# Patient Record
Sex: FEMALE | Race: WHITE | NOT HISPANIC OR LATINO | Employment: OTHER | URBAN - METROPOLITAN AREA
[De-identification: names, ages, dates, MRNs, and addresses within clinical notes are randomized per-mention and may not be internally consistent; named-entity substitution may affect disease eponyms.]

---

## 2017-02-08 DIAGNOSIS — Z13.820 ENCOUNTER FOR SCREENING FOR OSTEOPOROSIS: ICD-10-CM

## 2017-05-04 ENCOUNTER — GENERIC CONVERSION - ENCOUNTER (OUTPATIENT)
Dept: OTHER | Facility: OTHER | Age: 57
End: 2017-05-04

## 2017-06-07 DIAGNOSIS — Z12.31 ENCOUNTER FOR SCREENING MAMMOGRAM FOR MALIGNANT NEOPLASM OF BREAST: ICD-10-CM

## 2017-06-08 ENCOUNTER — ALLSCRIPTS OFFICE VISIT (OUTPATIENT)
Dept: OTHER | Facility: OTHER | Age: 57
End: 2017-06-08

## 2017-08-21 ENCOUNTER — GENERIC CONVERSION - ENCOUNTER (OUTPATIENT)
Dept: OTHER | Facility: OTHER | Age: 57
End: 2017-08-21

## 2017-08-28 ENCOUNTER — CLINICAL SUPPORT (OUTPATIENT)
Dept: OTHER | Facility: OTHER | Age: 57
End: 2017-08-28

## 2018-01-13 NOTE — MISCELLANEOUS
Signatures   Electronically signed by : Dagoberto Caba DO; Aug 28 2017 11:15AM EST                       (Author)

## 2018-01-14 NOTE — MISCELLANEOUS
Provider Comments  Provider Comments:   pt no showed today      Signatures   Electronically signed by : Jorje Tobin, ; Aug 21 2017  9:06AM EST                       (Author)

## 2018-01-14 NOTE — CONSULTS
Chief Complaint  Chief Complaint Free Text Note Form: Pt is here for her MWM consult  History of Present Illness  Free Text HPI: Excess weight:  Severity: Mild  Onset:past 4yrs  Modifiers:worse after breaking up w/ boyfriend, inconsistent lifestyle changes, hx of bulimia  Associated Symptoms: doesn't feel good in her body  Past Medical History    1  History of Leukopenia (288 50) (D72 819)  Active Problems And Past Medical History Reviewed: The active problems and past medical history were reviewed and updated today  Assessment    1  Weight gain (783 1) (R63 5)   2  Major depression, recurrent (296 30) (F33 9)   3  Bulimia nervosa (307 51) (F50 2)   4  Schreiber esophagus (530 85) (K22 70)    Discussion/Summary  Discussion Summary:   55 yo female w/ MDD, ?hypothyroidism and excess weight here to pursue medical weight management to improve weight and health  Excess weight/weight gain:  -discussed options of conservative vs LEANA program +/- meal replacement  -initial focus of 5-10% weight loss over 3-6 mos for improved health  -occurred after breaking up w/ bf over the course of 6 mos and due to depression unable to lose it    MDD:  -on Geodon, Seroquel and Paxil (known weight gainers)  -as per psych    Hypothyroidism:  -on very low dose T4    Bulimia:  -denies purging in the past 2 years, but admits to binging    Barretts:  -on PPI  -pt states last endoscopy was 5 yrs ago  -advised to follow up w/ GI    RTC in 1mos    Goals:  1  Food log [email or web address]  [email or web address]  2  No sugary beverages  At least 64oz of water daily  3  Increase physical activity by 10 minutes daily  4  9660-8168 calories, see sample menu  Review of Systems  Focused-Female:   Constitutional: no fever  ENT: no sore throat  Cardiovascular: no chest pain  Respiratory: no shortness of breath  Gastrointestinal: no abdominal pain  Genitourinary: no dysuria  Musculoskeletal: no arthralgias  Integumentary: rash  Neurological: no headache  Other Symptoms: Psych:+MDD, stable, + bulimia  Active Problems    1  Schreiber esophagus (530 85) (K22 70)   2  Bulimia nervosa (307 51) (F50 2)   3  Colonoscopy (Fiberoptic) Screening   4  Constipation (564 00) (K59 00)   5  Family Disruption Death Of Family Member Parent (V61 07)   6  Major depression, recurrent (296 30) (F33 9)    Surgical History    1  History of Biopsy Bone Marrow   2  History of Breast Surgery Enlargement Procedure   3  History of Rhinoplasty  Surgical History Reviewed: The surgical history was reviewed and updated today  Family History    1  Family history of Uterine Cancer (V16 49)    2  Family history of Esophageal Cancer (V16 0)    3  Family history of Colon Cancer (V16 0)  Family History Reviewed: The family history was reviewed and updated today  Social History    · Family Disruption Death Of Family Member Parent (V61 07)   · Never A Smoker   · Never Drank Alcohol  Social History Reviewed: The social history was reviewed and updated today  Current Meds   1  Dulcolax 5 MG Oral Tablet Delayed Release; Therapy: 59Squ5906 to Recorded   2  Geodon 80 MG Oral Capsule; TAKE 1 CAPSULE 3 times daily; Therapy: 85Izn2510 to (Evaluate:20Cku0425)  Requested for: 01Htx0282; Last   Rx:07Ald1061 Ordered   3  Levothyroxine Sodium 25 MCG Oral Tablet; Therapy: 01KMK7127 to (Last Rx:31Fnn9965)  Requested for: 80OMO3143 Ordered   4  LORazepam 2 MG Oral Tablet; TAKE 1 TABLET 4 TIMES DAILY; Therapy: (Recorded:91Luo2189) to Recorded   5  Omeprazole 40 MG Oral Capsule Delayed Release; TAKE 1 CAPSULE TWICE DAILY; Therapy: 99LLK1782 to (Evaluate:12Qxy3179)  Requested for: 55Uuz1839; Last   Rx:13Xni5365 Ordered   6  PARoxetine HCl - 30 MG Oral Tablet; TAKE 1 TABLET DAILY; Therapy: 34VCN0513 to (Evaluate:67Wjj9481)  Requested for: 51RHN6998; Last   RI:35FMO2685; Status: ACTIVE - Renewal Voided Ordered   7   SEROquel 200 MG Oral Tablet; TAKE 2 TABLETS AT BEDTIME; Therapy: 87VYM2267 to (Evaluate:20Jun2014)  Requested for: 11GXT0932; Last   KK:37PKA9616 Ordered  Medication List Reviewed: The medication list was reviewed and updated today  Allergies    1  RisperDAL TABS    2  Animal dander   3  Latex   4  Mold   5  Pollen    Vitals  Vital Signs [Data Includes: Current Encounter]    Recorded: 31KQK3760 09:50AM   Temperature 98 5 F    Heart Rate 80    Respiration 14    Systolic 272    Diastolic 72    Height 5 ft 4 5 in    Weight 165 lb 4 8 oz    BMI Calculated 27 94    BSA Calculated 1 81    Waist Circumference  38   Neck Circumference  13 5     Physical Exam    Constitutional   General appearance: No acute distress, well appearing and well nourished  well developed and overweight  Eyes no conjunctival pallor  Ears, Nose, Mouth, and Throat slightly dry oral mucosa  Pulmonary   Respiratory effort: No increased work of breathing or signs of respiratory distress  Auscultation of lungs: Clear to auscultation  Cardiovascular   Auscultation of heart: Normal rate and rhythm, normal S1 and S2, without murmurs  Examination of extremities for edema and/or varicosities: Normal     Abdomen   Abdomen: Non-tender, no masses  The abdomen was soft and nontender  Musculoskeletal   Gait and station: Normal     Psychiatric   Orientation to person, place, and time: Normal     Mood and affect: Normal   Mood and Affect: flat          Results/Data  Encounter Results   STOP BANG Questionnaire [id number] 10:03AM User, Ahs     Test Name Result Flag Reference   STOP BANG Questionnaire Risk of ISABELLA Low Risk     Snoring: No  Tired: Yes  Observed: No  Blood Pressure: No  BMI: No  Age: Yes  Neck Circumference: No  Gender: No   STOP BANG Questionnaire ISABELLA Total Score 2     Snoring: No  Tired: Yes  Observed: No  Blood Pressure: No  BMI: No  Age: Yes  Neck Circumference: No  Gender: No       Future Appointments    Date/Time Provider Specialty Site   03/28/2016 09:45 AM Aster Carlin IVAN Tinajero   Bariatric Medicine [facility]     Signatures   Electronically signed by : IVAN Zhou ; Feb 18 2016  1:07PM EST                       (Author)

## 2018-01-16 NOTE — PROGRESS NOTES
Assessment    1  Encounter for preventive health examination (V70 0) (Z00 00)   2  Neutropenia, unspecified (288 00) (D70 9)   3  Microcytic anemia (280 9) (D50 9)   4  Hypothyroidism (244 9) (E03 9)   5  Hyperlipidemia (272 4) (E78 5)    Plan  Schreiber esophagus    · *1 -  [facility] Co-Management  *  Status: Active   Requested for: 95OGC8570  Care Summary provided  : Yes  Colon cancer screening    · COLONOSCOPY; Status:Canceled;    · *1 -  [facility] Physician Referral  Consult  Status:  Canceled  Care Summary provided  : Yes  Colon cancer screening, Microcytic anemia    · COLONOSCOPY; Status:Active; Requested IPJ:[id number];   Encounter for screening mammogram for breast cancer    · * MAMMO SCREENING BILATERAL W CAD; Status:Hold For - Scheduling; Requested  for:07Jun2017;   Microcytic anemia    · Colace 100 MG Oral Capsule; take 1 capsule by mouth twice a day   · Ferrous Sulfate 325 (65 Fe) MG Oral Tablet Delayed Release; Take 1 tablet twice  daily   · 2 - *[facility] ( COLORECTAL SURGERY, GASTROENTEROLOGY)  Co-Management  *  Status: Hold For - Scheduling   Requested for: 53COB6288  Care Summary provided  : Yes  Microcytic anemia, Neutropenia, unspecified    · (Q) IRON, TIBC AND FERRITIN PANEL; Status:Active; Requested SIZ:[id number];     Discussion/Summary    Neutropenia - longstanding but worse  Will address anemia first and f/u  If trending down further will refer to Heme  Microcytic anemia with known h/o iron deficiency - has seen Dr Alejandro Warner in past for colonoscopy  Had seen Dr Megan Soto for Schreiber's, but not in a while  Will start iron supplement and check iron studies for baseline  Will need to go back to GI and colorectal for further evaluation given much lower Hgb  Hypothyroidism - euthyroid now on current levothyroxine  No change  Follow Q6-12 months  HLD - 10 yr ASCVD risk 2 2%, no need for statin  Will monitor annually    HM - Hasn't gone for colonoscopy (see above, referral given again)  Doesn't want mammogram due to her breast implants, discussed risk/benefit  Doesn't remember last pap, will request record from her last gyn  The patient was counseled regarding diagnostic results, instructions for management, risk factor reductions, prognosis, patient and family education, impressions, risks and benefits of treatment options, importance of compliance with treatment  Possible side effects of new medications were reviewed with the patient/guardian today  The treatment plan was reviewed with the patient/guardian  The patient/guardian understands and agrees with the treatment plan     Self Referrals: Yes Psych      Chief Complaint  Preventive Visit      History of Present Illness  HPI: Insurance prompted her to have her Preventive Visit  Reviewed old records available (scanned documents Nov 2016)  Hypothyroid - denies any symptoms  Neutropenia - has had eval in past (30 years worth) attributed to her psych meds in past   Depression - managed by psychiatrist at [facility], feels her mood is stable  Active Problems    1  Anxiety (300 00) (F41 9)   2  Back pain at L4-L5 level (724 2) (M54 5)   3  Schreiber esophagus (530 85) (K22 70)   4  Bulimia nervosa (307 51) (F50 2)   5  Colon cancer screening (V76 51) (Z12 11)   6  Colonoscopy (Fiberoptic) Screening   7  Constipation (564 00) (K59 00)   8  Encounter for mammogram to establish baseline mammogram (V76 12) (Z12 31)   9  Encounter for screening mammogram for breast cancer (V76 12) (Z12 31)   10  Family Disruption Death Of Family Member Parent (V61 07)   11  Hyperlipidemia (272 4) (E78 5)   12  Hypothyroidism (244 9) (E03 9)   13  Leukopenia (288 50) (D72 819)   14  Major depression, recurrent (296 30) (F33 9)   15  Microcytic anemia (280 9) (D50 9)   16  Need for influenza vaccination (V04 81) (Z23)   17  Neutropenia, unspecified (288 00) (D70 9)   18  Osteoporosis screening (V82 81) (Z13 820)   19   Synovial cyst of lumbar facet joint (727 40) (M71 38)   20  Weight gain (783 1) (R63 5)    Surgical History    · History of Biopsy Bone Marrow   · History of Breast Surgery Enlargement Procedure   · History of Rhinoplasty    Family History  Mother    · Family history of Uterine Cancer (V16 49)  Father    · Family history of Esophageal Cancer (V16 0)  Maternal Grandmother    · Family history of Colon Cancer (V16 0)    Social History    · Family Disruption Death Of Family Member Parent (V61 07)   · Never A Smoker   · Never Drank Alcohol    Current Meds   1  Dulcolax 5 MG Oral Tablet Delayed Release; Therapy: 36Rzc7911 to Recorded   2  Geodon 80 MG Oral Capsule; TAKE 1 CAPSULE 3 times daily; Therapy: 99Dvy9649 to (Evaluate:24Umb6361)  Requested for: 74Dgb6933; Last   Rx:45Qxg7065 Ordered   3  Levothyroxine Sodium 25 MCG Oral Tablet; take one tablet daily; Therapy: 20NUG2974 to (Evaluate:39Ytf8758)  Requested for: 54LGA7327; Last   Rx:36Iwh9749 Ordered   4  LORazepam 2 MG Oral Tablet; TAKE 1 TABLET 4 TIMES DAILY; Therapy: (Recorded:00Ecj1607) to Recorded   5  Omeprazole 40 MG Oral Capsule Delayed Release; TAKE 1 CAPSULE TWICE DAILY; Therapy: 20KGT4357 to (Evaluate:19Kvz3273)  Requested for: 81Hvu5649; Last   Rx:79Bjb4250 Ordered   6  PARoxetine HCl - 30 MG Oral Tablet; Take one tablet 3 times daily; Therapy: (Recorded:67Jez4504) to Recorded   7  SEROquel 200 MG Oral Tablet; TAKE 2 TABLETS AT BEDTIME; Therapy: 25XWK7238 to (Evaluate:65Fkp4146)  Requested for: 02QQK9490; Last   MX:80EZX8135 Ordered    Allergies    1  RisperDAL TABS    2  Animal dander   3  Latex   4  Mold   5   Pollen    Vitals   Recorded: 04TSU6137 08:58AM   Temperature 98 5 F   Heart Rate 80   Respiration 16   Systolic 291   Diastolic 90   Height 5 ft 4 5 in   Weight 174 lb    BMI Calculated 29 41   BSA Calculated 1 85   Pain Scale 0     Results/Data  PHQ-9 Adult Depression Screening 08Jun2017 09:05AM User, Ahs     Test Name Result Flag Reference PHQ-9 Adult Depression Score 5     Over the last two weeks, how often have you been bothered by any of the following problems? Little interest or pleasure in doing things: Several days - 1  Feeling down, depressed, or hopeless: Several days - 1  Trouble falling or staying asleep, or sleeping too much: Not at all - 0  Feeling tired or having little energy: Several days - 1  Poor appetite or over eating: Several days - 1  Feeling bad about yourself - or that you are a failure or have let yourself or your family down: Several days - 1  Trouble concentrating on things, such as reading the newspaper or watching television: Not at all - 0  Moving or speaking so slowly that other people could have noticed  Or the opposite -  being so fidgety or restless that you have been moving around a lot more than usual: Not at all - 0  Thoughts that you would be better off dead, or of hurting yourself in some way: Not at all - 0   PHQ-9 Adult Depression Screening Negative     PHQ-9 Difficulty Level Somewhat difficult     PHQ-9 Severity Mild Depression         Future Appointments    Date/Time Provider Specialty Site   08/21/2017 08:50 AM IVAN Keyes   [address]     Signatures   Electronically signed by : IVAN Guzman ; Jun 9 2017  5:11PM EST                       (Author)

## 2018-01-18 NOTE — MISCELLANEOUS
May 4, 2017      Dear [facility],    Your recent prescription  requested has been refilled  We have tried to reach you by phone  Please call to schedule follow up with Dr Gertha Burkitt and have blood work prior  to visit        Sincerely,      MARIE Mcnally 1      Electronically signed Patel Gandhi   May  4 2017 11:14AM EST

## 2018-01-22 VITALS
DIASTOLIC BLOOD PRESSURE: 90 MMHG | WEIGHT: 174 LBS | BODY MASS INDEX: 28.99 KG/M2 | HEART RATE: 80 BPM | TEMPERATURE: 98.5 F | RESPIRATION RATE: 16 BRPM | SYSTOLIC BLOOD PRESSURE: 138 MMHG | HEIGHT: 65 IN

## 2018-01-25 ENCOUNTER — TELEPHONE (OUTPATIENT)
Dept: FAMILY MEDICINE CLINIC | Facility: CLINIC | Age: 58
End: 2018-01-25

## 2018-01-25 ENCOUNTER — TELEPHONE (OUTPATIENT)
Dept: NEUROSURGERY | Facility: CLINIC | Age: 58
End: 2018-01-25

## 2018-01-26 ENCOUNTER — TELEPHONE (OUTPATIENT)
Dept: FAMILY MEDICINE CLINIC | Facility: CLINIC | Age: 58
End: 2018-01-26

## 2018-01-26 NOTE — TELEPHONE ENCOUNTER
Komal sent me a patient call note about this patient requesting Geodon, but I haven't seen her since last June  She needs to schedule an appt and then I will give her only enough to get to that appt  It looks like the other note was "closed" so I couldn't respond

## 2018-01-26 NOTE — TELEPHONE ENCOUNTER
Patient calling to request refill on Geodon 80mg tid  Checked allscripts, last visit 6/8/17, no pending appointments  Are you able to refill until appoint is scheduled

## 2018-02-05 ENCOUNTER — OFFICE VISIT (OUTPATIENT)
Dept: NEUROSURGERY | Facility: CLINIC | Age: 58
End: 2018-02-05
Payer: COMMERCIAL

## 2018-02-05 VITALS
HEIGHT: 63 IN | SYSTOLIC BLOOD PRESSURE: 110 MMHG | DIASTOLIC BLOOD PRESSURE: 70 MMHG | BODY MASS INDEX: 28.99 KG/M2 | WEIGHT: 163.6 LBS | TEMPERATURE: 97.9 F

## 2018-02-05 DIAGNOSIS — M54.41 CHRONIC BILATERAL LOW BACK PAIN WITH BILATERAL SCIATICA: ICD-10-CM

## 2018-02-05 DIAGNOSIS — G89.29 CHRONIC BILATERAL LOW BACK PAIN WITH BILATERAL SCIATICA: ICD-10-CM

## 2018-02-05 DIAGNOSIS — M54.42 CHRONIC BILATERAL LOW BACK PAIN WITH BILATERAL SCIATICA: ICD-10-CM

## 2018-02-05 DIAGNOSIS — M71.38 SYNOVIAL CYST OF LUMBAR FACET JOINT: Primary | ICD-10-CM

## 2018-02-05 PROCEDURE — 99204 OFFICE O/P NEW MOD 45 MIN: CPT | Performed by: RADIOLOGY

## 2018-02-05 NOTE — PROGRESS NOTES
Assessment/Plan:     Diagnoses and all orders for this visit:    Synovial cyst of lumbar facet joint  -     XR spine lumbar complete w bending minimum 6 views  -     Ambulatory referral to Physical Therapy  -     MRI lumbar spine without contrast; Future    Chronic bilateral low back pain with bilateral sciatica  -     XR spine lumbar complete w bending minimum 6 views  -     Ambulatory referral to Physical Therapy            Discussion Summary: Ms Shaquille Gimenez presents for evaluation of her back pain and treatment of a synovial cyst  Her back pain is primarily related to prolonged sitting and standing from the seated position  The pain itself is very reproducible with palpation over the facet joints and paraspinal musculature  She does not demonstrate a radiculopathy  Currently I do not feel that her synovial cyst is responsible for her symptoms  Additionally, it has been over 1 year since she has had any imaging and she has yet to try to any conservative therapy  We will obtain a repeat MRI and lumbar XR as well as a physical therapy referral  She may take Tylenol or Advil OTC for pain relief  I have also recommended she speak with her PCP for other medication strategies  Chief Complaint: Consult (synovial cyst)      Patient ID: Ping Liu is a 62 y o  female    HPI    Ms Shaquille Gimenez is a 62year-old woman with long-history of back pain for at least 2 years  States ever since injuring her back lifting a heavy kitchen appliance  The pain started on the right, but now also on the left  Sitting makes the pain better  When she stands to walk then the pain is worst with associated radiation "electrical" shooting pain down both buttocks, stopping just above the mid thigh  On average her pain is a 2/10 on the pain scale  It could be as high as 8/10  Walking or axial bending or twisting does not make the pain worse  No sensory changes  No weakness   No recent bowel/bladder dysfunction, but note is made of chronic constipation due to her psychiatric medications  Review of Systems   Constitutional: Negative  HENT: Negative  Eyes: Negative  Respiratory: Negative  Cardiovascular: Negative  Gastrointestinal: Negative  Endocrine: Negative  Genitourinary: Negative  Musculoskeletal: Positive for back pain (LBP, goes into hips and legs) and gait problem  Negative for arthralgias, joint swelling, myalgias, neck pain and neck stiffness  Skin: Negative  Allergic/Immunologic: Negative  Neurological: Positive for dizziness  Negative for tremors, seizures, syncope, facial asymmetry, speech difficulty, weakness, light-headedness, numbness and headaches  Hematological: Negative  Psychiatric/Behavioral: Negative  The following portions of the patient's history were reviewed and updated as appropriate: allergies, current medications, past family history, past medical history, past social history, past surgical history and problem list       Active Ambulatory Problems     Diagnosis Date Noted    Hyponatremia 09/25/2016    Major depressive disorder 09/26/2016     Resolved Ambulatory Problems     Diagnosis Date Noted    Pneumonia 09/25/2016     Past Medical History:   Diagnosis Date    Anxiety     Back pain at L4-L5 level     Schreiber esophagus     Bulimia     Depression     Disease of thyroid gland     GERD (gastroesophageal reflux disease)     Hyperlipidemia     Hypothyroidism     Leukopenia     Synovial cyst of lumbar spine     Vertigo     Weight gain        Past Surgical History:   Procedure Laterality Date    BONE MARROW BIOPSY      BREAST SURGERY      enlargement     RHINOPLASTY           Vitals:    02/05/18 0919   BP: 110/70   Temp: 97 9 °F (36 6 °C)         Objective:    Physical Exam   Constitutional: She is oriented to person, place, and time  She appears well-developed and well-nourished  HENT:   Head: Normocephalic and atraumatic     Eyes: Conjunctivae and EOM are normal  Pupils are equal, round, and reactive to light  Cardiovascular: Normal rate and regular rhythm  Pulmonary/Chest: Effort normal and breath sounds normal    Abdominal: Soft  Bowel sounds are normal    Musculoskeletal: Normal range of motion  She exhibits no edema or tenderness  Lower lumbar spine tenderness over the paraspinal areas and likely facet joint region  Neurological: She is alert and oriented to person, place, and time  She has normal strength and normal reflexes  No cranial nerve deficit or sensory deficit  Neurologic Exam     Mental Status   Oriented to person, place, and time  Cranial Nerves     CN III, IV, VI   Pupils are equal, round, and reactive to light  Extraocular motions are normal      Motor Exam     Strength   Strength 5/5 throughout  Results/Data:  I have reviewed the MRI lumbar spine with the patient

## 2018-02-05 NOTE — LETTER
February 5, 2018     Emily Lane MD  [address]    Patient: Brando Posadas   YOB: 1960   Date of Visit: 2/5/2018       Dear Dr Jessee Ma: Thank you for referring Brando Posadas to me for evaluation  Below are my notes for this consultation  If you have questions, please do not hesitate to call me  I look forward to following your patient along with you  Sincerely,        Neelam Orosco MD        CC: No Recipients  Neelam Orosco MD  2/5/2018  4:35 PM  Sign at close encounter  Assessment/Plan:     Diagnoses and all orders for this visit:    Synovial cyst of lumbar facet joint  -     XR spine lumbar complete w bending minimum 6 views  -     Ambulatory referral to Physical Therapy  -     MRI lumbar spine without contrast; Future    Chronic bilateral low back pain with bilateral sciatica  -     XR spine lumbar complete w bending minimum 6 views  -     Ambulatory referral to Physical Therapy            Discussion Summary: Ms Veena Persaud presents for evaluation of her back pain and treatment of a synovial cyst  Her back pain is primarily related to prolonged sitting and standing from the seated position  The pain itself is very reproducible with palpation over the facet joints and paraspinal musculature  She does not demonstrate a radiculopathy  Currently I do not feel that her synovial cyst is responsible for her symptoms  Additionally, it has been over 1 year since she has had any imaging and she has yet to try to any conservative therapy  We will obtain a repeat MRI and lumbar XR as well as a physical therapy referral  She may take Tylenol or Advil OTC for pain relief  I have also recommended she speak with her PCP for other medication strategies  Chief Complaint: Consult (synovial cyst)      Patient ID: Brando Posadas is a 62 y o  female    HPI    Ms Veena Persaud is a 62year-old woman with long-history of back pain for at least 2 years  States ever since injuring her back lifting a heavy kitchen appliance  The pain started on the right, but now also on the left  Sitting makes the pain better  When she stands to walk then the pain is worst with associated radiation "electrical" shooting pain down both buttocks, stopping just above the mid thigh  On average her pain is a 2/10 on the pain scale  It could be as high as 8/10  Walking or axial bending or twisting does not make the pain worse  No sensory changes  No weakness  No recent bowel/bladder dysfunction, but note is made of chronic constipation due to her psychiatric medications  Review of Systems   Constitutional: Negative  HENT: Negative  Eyes: Negative  Respiratory: Negative  Cardiovascular: Negative  Gastrointestinal: Negative  Endocrine: Negative  Genitourinary: Negative  Musculoskeletal: Positive for back pain (LBP, goes into hips and legs) and gait problem  Negative for arthralgias, joint swelling, myalgias, neck pain and neck stiffness  Skin: Negative  Allergic/Immunologic: Negative  Neurological: Positive for dizziness  Negative for tremors, seizures, syncope, facial asymmetry, speech difficulty, weakness, light-headedness, numbness and headaches  Hematological: Negative  Psychiatric/Behavioral: Negative            {Common ambulatory SmartLinks:20205}      Active Ambulatory Problems     Diagnosis Date Noted    Hyponatremia 09/25/2016    Major depressive disorder 09/26/2016     Resolved Ambulatory Problems     Diagnosis Date Noted    Pneumonia 09/25/2016     Past Medical History:   Diagnosis Date    Anxiety     Back pain at L4-L5 level     Schreiber esophagus     Bulimia     Depression     Disease of thyroid gland     GERD (gastroesophageal reflux disease)     Hyperlipidemia     Hypothyroidism     Leukopenia     Synovial cyst of lumbar spine     Vertigo     Weight gain        Past Surgical History:   Procedure Laterality Date    BONE MARROW BIOPSY      BREAST SURGERY      enlargement     RHINOPLASTY           Vitals:    02/05/18 0919   BP: 110/70   Temp: 97 9 °F (36 6 °C)         Objective:    Physical Exam   Constitutional: She is oriented to person, place, and time  She appears well-developed and well-nourished  HENT:   Head: Normocephalic and atraumatic  Eyes: Conjunctivae and EOM are normal  Pupils are equal, round, and reactive to light  Cardiovascular: Normal rate and regular rhythm  Pulmonary/Chest: Effort normal and breath sounds normal    Abdominal: Soft  Bowel sounds are normal    Musculoskeletal: Normal range of motion  She exhibits no edema or tenderness  Lower lumbar spine tenderness over the paraspinal areas and likely facet joint region  Neurological: She is alert and oriented to person, place, and time  She has normal strength and normal reflexes  No cranial nerve deficit or sensory deficit  Neurologic Exam     Mental Status   Oriented to person, place, and time  Cranial Nerves     CN III, IV, VI   Pupils are equal, round, and reactive to light  Extraocular motions are normal      Motor Exam     Strength   Strength 5/5 throughout  Results/Data:  I have reviewed the MRI lumbar spine with the patient

## 2018-02-20 ENCOUNTER — HOSPITAL ENCOUNTER (OUTPATIENT)
Dept: RADIOLOGY | Facility: HOSPITAL | Age: 58
Discharge: HOME | End: 2018-02-20
Attending: RADIOLOGY
Payer: COMMERCIAL

## 2018-02-20 ENCOUNTER — TRANSCRIBE ORDERS (OUTPATIENT)
Dept: RADIOLOGY | Facility: HOSPITAL | Age: 58
End: 2018-02-20

## 2018-02-20 DIAGNOSIS — M54.42 CHRONIC MIDLINE LOW BACK PAIN WITH BILATERAL SCIATICA: ICD-10-CM

## 2018-02-20 DIAGNOSIS — M71.38 SYNOVIAL CYST OF LUMBAR FACET JOINT: Primary | ICD-10-CM

## 2018-02-20 DIAGNOSIS — M54.41 ACUTE BACK PAIN WITH SCIATICA, RIGHT: ICD-10-CM

## 2018-02-20 DIAGNOSIS — M71.38 SYNOVIAL CYST OF LUMBAR FACET JOINT: ICD-10-CM

## 2018-02-20 DIAGNOSIS — M54.41 CHRONIC MIDLINE LOW BACK PAIN WITH BILATERAL SCIATICA: ICD-10-CM

## 2018-02-20 DIAGNOSIS — G89.29 CHRONIC MIDLINE LOW BACK PAIN WITH BILATERAL SCIATICA: ICD-10-CM

## 2018-02-20 PROCEDURE — 72148 MRI LUMBAR SPINE W/O DYE: CPT

## 2018-02-20 PROCEDURE — 72110 X-RAY EXAM L-2 SPINE 4/>VWS: CPT

## 2018-02-23 ENCOUNTER — TELEPHONE (OUTPATIENT)
Dept: NEUROSURGERY | Facility: CLINIC | Age: 58
End: 2018-02-23

## 2018-02-23 NOTE — TELEPHONE ENCOUNTER
Attempted to contact patient to advise her that Dr Jun Millard would like to refer her to ortho  Received no answer left message for the patient to call back on direct extension

## 2018-02-28 NOTE — TELEPHONE ENCOUNTER
Received call back from João Moreland today in response to my recent call to her  Advised her that Dr Tita Antoine wanted to have her FUP with her ortho doctor  The patient wanted to know why and had questions about the findings in the recent imaging studies  She stated she was under the impression the cyst was going to be aspirated  Noted in Dr Suha Dodson that the cyst was shrinking however I am unable to discuss his findings with her  Kept appt approaching 03/12/2018 to discuss this further with Md  Patient agreeable

## 2018-03-12 ENCOUNTER — TELEPHONE (OUTPATIENT)
Dept: OBGYN CLINIC | Facility: HOSPITAL | Age: 58
End: 2018-03-12

## 2018-03-12 ENCOUNTER — OFFICE VISIT (OUTPATIENT)
Dept: NEUROSURGERY | Facility: CLINIC | Age: 58
End: 2018-03-12
Payer: COMMERCIAL

## 2018-03-12 VITALS
SYSTOLIC BLOOD PRESSURE: 110 MMHG | DIASTOLIC BLOOD PRESSURE: 70 MMHG | HEIGHT: 63 IN | TEMPERATURE: 97.6 F | WEIGHT: 162.4 LBS | BODY MASS INDEX: 28.77 KG/M2

## 2018-03-12 DIAGNOSIS — M71.38 SYNOVIAL CYST OF LUMBAR SPINE: Primary | ICD-10-CM

## 2018-03-12 PROCEDURE — 99213 OFFICE O/P EST LOW 20 MIN: CPT | Performed by: RADIOLOGY

## 2018-03-12 RX ORDER — QUETIAPINE FUMARATE 400 MG/1
400 TABLET, FILM COATED ORAL
Refills: 5 | Status: ON HOLD | COMMUNITY
Start: 2018-03-05 | End: 2018-08-29

## 2018-03-12 NOTE — PROGRESS NOTES
Assessment/Plan:     Diagnoses and all orders for this visit:    Synovial cyst of lumbar spine  -     Ambulatory referral to Orthopedic Surgery; Future            Discussion Summary: Ms Letha Carcamo presents for follow-up and review of her spine imaging  I have discussed with her that her synovial cyst has regressed however she has increasing stress edema in the L4 and L5 pedicles with evidence of L4 on L5 motion on her flexion-extension X-rays  I have advised she discuss further management with Dr Margaret Gant as a CT guided laminotomy and synovial cyst fenestration will likely be of no benefit  The patient, patient's family was counseled regarding diagnostic results, instructions for management  Total time of encounter was 45 minutes and 25 minutes was spent counseling  Chief Complaint: Follow-up (f/u with new MRI and xray)      Patient ID: Antonio Cunningham is a 62 y o  female    HPI     Ms Plascencia returns following MRI and lumbar XR imaging  She reports increasing back pain, very severe now, limiting her ability to perform tasks  She has not been to physical therapy as she fells it made her mother worse so she is afraid of the same  She has not yet made an appointment with Dr Margaret Gant as well  Review of Systems   Constitutional: Negative for activity change, appetite change, chills, diaphoresis, fatigue, fever and unexpected weight change  HENT: Negative  Eyes: Negative  Respiratory: Negative  Cardiovascular: Negative  Gastrointestinal: Negative  Endocrine: Negative  Genitourinary: Negative  Musculoskeletal: Positive for back pain and gait problem  Negative for arthralgias, joint swelling, myalgias, neck pain and neck stiffness  Skin: Negative  Allergic/Immunologic: Negative  Neurological: Positive for weakness and numbness  Negative for dizziness, tremors, seizures, syncope, facial asymmetry, speech difficulty, light-headedness and headaches     Hematological: Negative  Psychiatric/Behavioral: Negative  The following portions of the patient's history were reviewed and updated as appropriate: allergies, current medications, past family history, past medical history, past social history, past surgical history and problem list       Active Ambulatory Problems     Diagnosis Date Noted    Hyponatremia 09/25/2016    Major depressive disorder 09/26/2016     Resolved Ambulatory Problems     Diagnosis Date Noted    Pneumonia 09/25/2016     Past Medical History:   Diagnosis Date    Anxiety     Back pain at L4-L5 level     Schreiber esophagus     Bulimia     Depression     Disease of thyroid gland     GERD (gastroesophageal reflux disease)     Hyperlipidemia     Hypothyroidism     Leukopenia     Synovial cyst of lumbar spine     Vertigo     Weight gain        Past Surgical History:   Procedure Laterality Date    BONE MARROW BIOPSY      BREAST SURGERY      enlargement     RHINOPLASTY           Vitals:    03/12/18 0904   BP: 110/70   Temp: 97 6 °F (36 4 °C)         Objective:    Physical Exam   Constitutional: She is oriented to person, place, and time  She appears well-developed  Musculoskeletal: She exhibits tenderness  Tenderness of the lumbar spine  Neurological: She is alert and oriented to person, place, and time  She has normal reflexes  No cranial nerve deficit  Coordination normal      Neurologic Exam     Mental Status   Oriented to person, place, and time  Results/Data:  I have reviewed the results and images from the MRI and XR in detail with the patient

## 2018-03-12 NOTE — TELEPHONE ENCOUNTER
Caller: patient  Callback# [phone]  Dr Marylee Foreman        I schedule patient 04/16 as a new patient  Patient doesn't want to wait until than can you please view MRI to see if she need to be sooner  Thanks

## 2018-03-19 ENCOUNTER — OFFICE VISIT (OUTPATIENT)
Dept: OBGYN CLINIC | Facility: HOSPITAL | Age: 58
End: 2018-03-19
Payer: COMMERCIAL

## 2018-03-19 VITALS
SYSTOLIC BLOOD PRESSURE: 133 MMHG | DIASTOLIC BLOOD PRESSURE: 89 MMHG | HEART RATE: 77 BPM | BODY MASS INDEX: 27.83 KG/M2 | WEIGHT: 163 LBS | HEIGHT: 64 IN

## 2018-03-19 DIAGNOSIS — M54.16 LUMBAR RADICULOPATHY: Primary | ICD-10-CM

## 2018-03-19 DIAGNOSIS — M43.16 SPONDYLOLISTHESIS AT L4-L5 LEVEL: ICD-10-CM

## 2018-03-19 DIAGNOSIS — M51.36 DDD (DEGENERATIVE DISC DISEASE), LUMBAR: ICD-10-CM

## 2018-03-19 DIAGNOSIS — M48.062 SPINAL STENOSIS OF LUMBAR REGION WITH NEUROGENIC CLAUDICATION: ICD-10-CM

## 2018-03-19 DIAGNOSIS — M54.40 LOW BACK PAIN WITH SCIATICA, SCIATICA LATERALITY UNSPECIFIED, UNSPECIFIED BACK PAIN LATERALITY, UNSPECIFIED CHRONICITY: Primary | ICD-10-CM

## 2018-03-19 PROBLEM — M51.369 DDD (DEGENERATIVE DISC DISEASE), LUMBAR: Status: ACTIVE | Noted: 2018-03-19

## 2018-03-19 PROCEDURE — 99213 OFFICE O/P EST LOW 20 MIN: CPT | Performed by: ORTHOPAEDIC SURGERY

## 2018-03-19 NOTE — ASSESSMENT & PLAN NOTE
Worsening lower back and bilateral leg pain  Patient with history of multilevel lumbar DDD most pronounced at L4-5 and L5-S1  Grade 1/2 spondylolisthesis at L4-5  Patient is interested in definitive intervention  She has not had injections  I have encouraged at although she is a candidate for lumbar decompression and fusion L4-S1 she can try an epidural steroid injection at the L4-5 level to help alleviate some of her symptoms  She will be referred to pain management for lumbar epidural steroid injections  She will follow up in 4-6 weeks for re-evaluation thereafter    If no significant improvement she will be scheduled for surgical decompression and fusion L4-S1

## 2018-03-19 NOTE — PROGRESS NOTES
Assessment/Plan:    DDD (degenerative disc disease), lumbar  Worsening lower back and bilateral leg pain  Patient with history of multilevel lumbar DDD most pronounced at L4-5 and L5-S1  Grade 1/2 spondylolisthesis at L4-5  Patient is interested in definitive intervention  She has not had injections  I have encouraged at although she is a candidate for lumbar decompression and fusion L4-S1 she can try an epidural steroid injection at the L4-5 level to help alleviate some of her symptoms  She will be referred to pain management for lumbar epidural steroid injections  She will follow up in 4-6 weeks for re-evaluation thereafter  If no significant improvement she will be scheduled for surgical decompression and fusion L4-S1  Problem List Items Addressed This Visit     Lumbar radiculopathy - Primary    DDD (degenerative disc disease), lumbar     Worsening lower back and bilateral leg pain  Patient with history of multilevel lumbar DDD most pronounced at L4-5 and L5-S1  Grade 1/2 spondylolisthesis at L4-5  Patient is interested in definitive intervention  She has not had injections  I have encouraged at although she is a candidate for lumbar decompression and fusion L4-S1 she can try an epidural steroid injection at the L4-5 level to help alleviate some of her symptoms  She will be referred to pain management for lumbar epidural steroid injections  She will follow up in 4-6 weeks for re-evaluation thereafter  If no significant improvement she will be scheduled for surgical decompression and fusion L4-S1  Spondylolisthesis at L4-L5 level      Other Visit Diagnoses     Spinal stenosis of lumbar region with neurogenic claudication                Subjective:      Patient ID: Rochelle Coles is a 62 y o  female  HPI  Patient presents to the office for low back and bilateral leg pain  Her leg pain goes down to her knees bilaterally   She has had an increase of pain since her last visit in 2016  Her pain increases with activities such as standing and walking  She describes a classic shopping cart sign when she walks  She does not report incontinence of bowel or bladder     The following portions of the patient's history were reviewed and updated as appropriate: current medications, past family history, past medical history, past social history, past surgical history and problem list     Review of Systems   Constitutional: Negative  HENT: Negative  Eyes: Negative  Cardiovascular: Negative  Gastrointestinal: Negative  Genitourinary: Negative  Musculoskeletal: Positive for back pain  Skin: Negative  Psychiatric/Behavioral: Negative  Objective:      /89   Pulse 77   Ht 5' 4" (1 626 m)   Wt 73 9 kg (163 lb)   BMI 27 98 kg/m²          Physical Exam   Constitutional: She is oriented to person, place, and time  She appears well-developed and well-nourished  HENT:   Head: Normocephalic and atraumatic  Eyes: Pupils are equal, round, and reactive to light  Neck: Neck supple  Cardiovascular: Intact distal pulses  Neurological: She is alert and oriented to person, place, and time  Skin: Skin is warm and dry  Psychiatric: She has a normal mood and affect  Her behavior is normal        Awake, alert and oriented x 3  Affect as appropriate  Patient ambulates without assistance  Modified straight leg raise negative bilaterally  Strength 5/5 L2-S1 bilaterally  Sensation intact bilaterally  Reflexes hypoactive at L4 and S1 bilaterally        Imaging    MRI lumbar spine demonstrates multilevel lumbar DDD  She has a grade 1/2 spondylolisthesis at L4-5 with severe canal and lateral recess stenosis  She has mild stenosis at L5-S1

## 2018-03-26 ENCOUNTER — TELEPHONE (OUTPATIENT)
Dept: OBGYN CLINIC | Facility: HOSPITAL | Age: 58
End: 2018-03-26

## 2018-03-26 DIAGNOSIS — M54.16 RADICULOPATHY, LUMBAR REGION: Primary | ICD-10-CM

## 2018-03-26 RX ORDER — METHYLPREDNISOLONE 4 MG/1
TABLET ORAL
Qty: 21 TABLET | Refills: 0 | Status: SHIPPED | OUTPATIENT
Start: 2018-03-26 | End: 2018-08-29 | Stop reason: HOSPADM

## 2018-03-26 NOTE — TELEPHONE ENCOUNTER
Long discussion with patient  She has a history of depression and she feels like the Diclofenac is making it worse  She denies any suicidal or homicidal ideations  She was instructed to stop this medication and avoid NSAIDs, and to touch base with her Psychiatrist as well regarding this issue  Medrol dose pack was put through to her pharmacy to help with her symptoms  She was also instructed to establish care with the spine and pain team per Dr Kota Coyne recommendations at last visit    Thanks

## 2018-03-26 NOTE — TELEPHONE ENCOUNTER
Caller: patient  Callback# [phone]  Dr Berg Cough        Patient is requesting a callback stated she is feeling extremely dizzy and depress while taken diclofenac sodium  Please call thanks

## 2018-04-14 DIAGNOSIS — E03.9 ACQUIRED HYPOTHYROIDISM: Primary | ICD-10-CM

## 2018-04-16 RX ORDER — LEVOTHYROXINE SODIUM 0.03 MG/1
TABLET ORAL
Qty: 30 TABLET | Refills: 2 | Status: SHIPPED | OUTPATIENT
Start: 2018-04-16 | End: 2018-07-15 | Stop reason: SDUPTHER

## 2018-04-23 ENCOUNTER — OFFICE VISIT (OUTPATIENT)
Dept: OBGYN CLINIC | Facility: HOSPITAL | Age: 58
End: 2018-04-23
Payer: COMMERCIAL

## 2018-04-23 VITALS
BODY MASS INDEX: 28.17 KG/M2 | HEIGHT: 64 IN | DIASTOLIC BLOOD PRESSURE: 81 MMHG | HEART RATE: 92 BPM | SYSTOLIC BLOOD PRESSURE: 119 MMHG | WEIGHT: 165 LBS

## 2018-04-23 DIAGNOSIS — M51.36 DDD (DEGENERATIVE DISC DISEASE), LUMBAR: ICD-10-CM

## 2018-04-23 DIAGNOSIS — M54.16 LUMBAR RADICULOPATHY: Primary | ICD-10-CM

## 2018-04-23 DIAGNOSIS — M43.16 SPONDYLOLISTHESIS AT L4-L5 LEVEL: ICD-10-CM

## 2018-04-23 PROCEDURE — 99213 OFFICE O/P EST LOW 20 MIN: CPT | Performed by: ORTHOPAEDIC SURGERY

## 2018-04-23 RX ORDER — MELOXICAM 7.5 MG/1
7.5 TABLET ORAL DAILY PRN
Qty: 21 TABLET | Refills: 0 | Status: SHIPPED | OUTPATIENT
Start: 2018-04-23 | End: 2018-04-23

## 2018-04-23 NOTE — PROGRESS NOTES
Assessment/Plan:    Patient seen examined by Dr Alfred Colvin and myself  She has a history of chronic low back pain secondary to multilevel lumbar degenerative disc disease most pronounced at L4-5 and L5-S1 she also has a grade 1-2 spondylolisthesis at L4-5  Symptoms are improved since her last visit with resolution of bilateral leg pain  Our recommendation is to establish care with pain management for injections and oral medications  She wishes to avoid surgery at all costs at this time  She can follow up with us on a p r n  basis  Problem List Items Addressed This Visit     Lumbar radiculopathy - Primary    Relevant Medications    meloxicam (MOBIC) 7 5 mg tablet    Other Relevant Orders    Ambulatory referral to Pain Management    DDD (degenerative disc disease), lumbar    Relevant Medications    meloxicam (MOBIC) 7 5 mg tablet    Other Relevant Orders    Ambulatory referral to Pain Management    Spondylolisthesis at L4-L5 level    Relevant Medications    meloxicam (MOBIC) 7 5 mg tablet    Other Relevant Orders    Ambulatory referral to Pain Management            Subjective:      Patient ID: Gladis Baca is a 62 y o  female  HPI     The patient is a 51-year-old female who presents for follow-up appointment  She was last seen about one month ago for chronic low back pain and bilateral leg pain  She was referred to Pain Management for injections however she did not have this done due to the fact she was frayed of more pain from the injection  Today she states that overall she feels better, her lower back pain is unchanged however she has improvement in leg pain and now that is resolved  She does admit to chronic intermittent left foot numbness which is unchanged  No bowel or bladder incontinence no saddle anesthesia  Today she is looking for alternatives to surgery      The following portions of the patient's history were reviewed and updated as appropriate: allergies, current medications, past family history, past medical history, past social history, past surgical history and problem list     Review of Systems   Constitutional: Negative for appetite change, chills, diaphoresis, fatigue and fever  Respiratory: Negative for chest tightness, shortness of breath and wheezing  Cardiovascular: Negative for chest pain, palpitations and leg swelling  Gastrointestinal: Negative for abdominal pain, constipation and vomiting  Genitourinary: Negative for decreased urine volume and difficulty urinating  Musculoskeletal: Positive for arthralgias and back pain  Negative for gait problem  Skin: Negative for color change, pallor, rash and wound  Neurological: Positive for numbness  Negative for weakness  Objective:      /81   Pulse 92   Ht 5' 4 02" (1 626 m)   Wt 74 8 kg (165 lb)   BMI 28 31 kg/m²          Physical Exam   Constitutional: She is oriented to person, place, and time  She appears well-developed and well-nourished  No distress  HENT:   Head: Normocephalic and atraumatic  Cardiovascular: Intact distal pulses  Pulmonary/Chest: Effort normal    Abdominal: Soft  Musculoskeletal: She exhibits no edema or tenderness  Neurological: She is alert and oriented to person, place, and time  Skin: Skin is warm and dry  She is not diaphoretic  Psychiatric: She has a normal mood and affect  Nursing note and vitals reviewed  No acute distress  Gait is normal  She has pinpoint pupils  Inspection reveals no open wounds or erythema  He lumbosacral region is nontender to palpation  Negative modified straight leg She is motor and sensory stable L2 through S1  Reflexes are hypoactive at L4 and S1 bilaterally  Feet are warm well perfused there is no calf tenderness or pitting edema

## 2018-05-10 ENCOUNTER — TELEPHONE (OUTPATIENT)
Dept: OBGYN CLINIC | Facility: HOSPITAL | Age: 58
End: 2018-05-10

## 2018-05-10 DIAGNOSIS — M51.36 DDD (DEGENERATIVE DISC DISEASE), LUMBAR: Primary | ICD-10-CM

## 2018-05-10 NOTE — TELEPHONE ENCOUNTER
Patient sees Dr Hemant Griffith  She is calling to request a refill on her Meloxicam 7 5mg tabs  She uses the [facility] on file

## 2018-05-11 RX ORDER — MELOXICAM 7.5 MG/1
7.5 TABLET ORAL DAILY PRN
Qty: 21 TABLET | Refills: 0 | Status: SHIPPED | OUTPATIENT
Start: 2018-05-11 | End: 2018-06-05 | Stop reason: SDUPTHER

## 2018-05-25 ENCOUNTER — OFFICE VISIT (OUTPATIENT)
Dept: FAMILY MEDICINE CLINIC | Facility: CLINIC | Age: 58
End: 2018-05-25
Payer: COMMERCIAL

## 2018-05-25 VITALS
DIASTOLIC BLOOD PRESSURE: 80 MMHG | SYSTOLIC BLOOD PRESSURE: 140 MMHG | HEIGHT: 63 IN | RESPIRATION RATE: 14 BRPM | TEMPERATURE: 99.4 F | HEART RATE: 80 BPM | WEIGHT: 170.2 LBS | BODY MASS INDEX: 30.16 KG/M2

## 2018-05-25 DIAGNOSIS — R05.9 COUGH: Primary | ICD-10-CM

## 2018-05-25 DIAGNOSIS — Z87.39 HISTORY OF OSTEOPOROSIS: ICD-10-CM

## 2018-05-25 PROCEDURE — 99213 OFFICE O/P EST LOW 20 MIN: CPT | Performed by: FAMILY MEDICINE

## 2018-05-25 RX ORDER — FLUTICASONE PROPIONATE 50 MCG
1 SPRAY, SUSPENSION (ML) NASAL DAILY
Qty: 1 BOTTLE | Refills: 0 | Status: SHIPPED | OUTPATIENT
Start: 2018-05-25 | End: 2018-08-29 | Stop reason: HOSPADM

## 2018-05-25 NOTE — ASSESSMENT & PLAN NOTE
- acute on chronic cough with acute viral URI  - has chronic GERD which causes nighttime coughing, despite omeprazole use, will refer to ENT, will also need EGD in the future as well   -supportive care with flonase and nasal saline use   - follow up in 3 months

## 2018-05-25 NOTE — PROGRESS NOTES
Assessment/Plan     History of osteoporosis  -diagnosed 10 years ago with no follow up, on PPI which is risk factor  - will obtain dexa scan     Cough  - acute on chronic cough with acute viral URI  - has chronic GERD which causes nighttime coughing, despite omeprazole use, will refer to ENT, will also need EGD in the future as well   -supportive care with flonase and nasal saline use   - follow up in 3 months      Diagnoses and all orders for this visit:    Cough  -     fluticasone (FLONASE) 50 mcg/act nasal spray; 1 spray into each nostril daily for 14 days  -     Ambulatory Referral to Otolaryngology; Future    History of osteoporosis  -     DXA bone density spine hip and pelvis; Future         Subjective     Chief Complaint: cough    HPI:   This is a 61 yo F who presents for nasal congestion for 3 days  She reports subjective fever from Tuesday to Thursday that has now resolved  She denies ear pain, sneezing  She has dry cough  She reports a history of GERD that she takes omeprazole that does not control her reflux symptoms and cough  The following portions of the patient's history were reviewed and updated as appropriate: allergies, current medications, past family history, past medical history, past social history, past surgical history and problem list     Review of Systems  Review of Systems   Constitutional: Negative for fatigue and fever  HENT: Positive for congestion  Negative for postnasal drip  Respiratory: Positive for cough  Negative for shortness of breath  Cardiovascular: Negative for chest pain and palpitations  Objective   Vitals:    05/25/18 1131   BP: 140/80   Pulse: 80   Resp: 14   Temp: 99 4 °F (37 4 °C)       Physical Exam   Constitutional: She is oriented to person, place, and time  She appears well-developed and well-nourished  HENT:   Head: Normocephalic and atraumatic     Right Ear: External ear normal    Left Ear: External ear normal    Mouth/Throat: Oropharynx is clear and moist  No oropharyngeal exudate  Swelling of right nasal turbinate    Neck: Normal range of motion  Neck supple  Cardiovascular: Normal rate, regular rhythm and normal heart sounds  No murmur heard  Pulmonary/Chest: Effort normal and breath sounds normal  No respiratory distress  She has no wheezes  Abdominal: Soft  Bowel sounds are normal  There is no tenderness  Neurological: She is alert and oriented to person, place, and time  Skin: Skin is warm  Psychiatric: She has a normal mood and affect  Lab Results: I have reviewed all the lab results

## 2018-06-05 ENCOUNTER — TELEPHONE (OUTPATIENT)
Dept: OBGYN CLINIC | Facility: HOSPITAL | Age: 58
End: 2018-06-05

## 2018-06-05 DIAGNOSIS — M51.36 DDD (DEGENERATIVE DISC DISEASE), LUMBAR: ICD-10-CM

## 2018-06-05 RX ORDER — MELOXICAM 7.5 MG/1
7.5 TABLET ORAL DAILY PRN
Qty: 40 TABLET | Refills: 0 | Status: SHIPPED | OUTPATIENT
Start: 2018-06-05 | End: 2018-06-12 | Stop reason: SINTOL

## 2018-06-05 NOTE — TELEPHONE ENCOUNTER
Dr Tanika Hemphill    Patient called requesting a refill on meloxicam (MOBIC) 7 5 mg tablet     Take 1 tablet (7 5 mg total) by mouth daily as needed for mild pain or moderate pain    [facility]/pharmacy #[id number], [address] [place] [address]

## 2018-06-07 ENCOUNTER — TELEPHONE (OUTPATIENT)
Dept: FAMILY MEDICINE CLINIC | Facility: CLINIC | Age: 58
End: 2018-06-07

## 2018-06-07 NOTE — TELEPHONE ENCOUNTER
Patient seen Dr Pa Keen,  About 2 weeks ago for a URI,  She states she's still not better and now has dizziness  An appt was offered and refused but would rather a return call from nurse with advice

## 2018-06-08 ENCOUNTER — HOSPITAL ENCOUNTER (OUTPATIENT)
Dept: RADIOLOGY | Age: 58
Discharge: HOME | End: 2018-06-08
Payer: COMMERCIAL

## 2018-06-08 DIAGNOSIS — Z87.39 HISTORY OF OSTEOPOROSIS: ICD-10-CM

## 2018-06-08 PROCEDURE — 77080 DXA BONE DENSITY AXIAL: CPT

## 2018-06-08 NOTE — TELEPHONE ENCOUNTER
Spoke with patient, she is complaining of vertigo, nausea and low grade temp 99  She has no nasal symptoms, no ear symptoms, no coughing  Offered appoint, declined  Advised she hydrate herself, BRAT diet  She did say she will try these things, if no improvement by Monday, she will call to schedule

## 2018-06-12 ENCOUNTER — TELEPHONE (OUTPATIENT)
Dept: OBGYN CLINIC | Facility: HOSPITAL | Age: 58
End: 2018-06-12

## 2018-06-12 DIAGNOSIS — M51.36 DDD (DEGENERATIVE DISC DISEASE), LUMBAR: Primary | ICD-10-CM

## 2018-06-12 RX ORDER — DICLOFENAC POTASSIUM 50 MG/1
25 TABLET, FILM COATED ORAL 2 TIMES DAILY PRN
Qty: 40 TABLET | Refills: 0 | Status: SHIPPED | OUTPATIENT
Start: 2018-06-12 | End: 2018-07-01 | Stop reason: SDUPTHER

## 2018-06-12 NOTE — TELEPHONE ENCOUNTER
Patient instructed to stop Mobic  She is not interested in taking Tylenol or Motrin  She has declined Medrol dose pack  Will Rx Diclofenac PRN    Patient made aware that she can  at pharmacy

## 2018-06-12 NOTE — TELEPHONE ENCOUNTER
Caller: patient  Call back number: [phone]  Patient's doctor: Dr John Pierre    Patient called stating the meloxicam is making her dizzy  She is screaming that she will not take this and needs something else  She is very upset and wants to speak to someone

## 2018-06-15 ENCOUNTER — TELEPHONE (OUTPATIENT)
Dept: OBGYN CLINIC | Facility: HOSPITAL | Age: 58
End: 2018-06-15

## 2018-06-15 ENCOUNTER — CLINICAL SUPPORT (OUTPATIENT)
Dept: PAIN MEDICINE | Facility: CLINIC | Age: 58
End: 2018-06-15
Payer: COMMERCIAL

## 2018-06-15 VITALS
SYSTOLIC BLOOD PRESSURE: 107 MMHG | RESPIRATION RATE: 14 BRPM | WEIGHT: 168 LBS | BODY MASS INDEX: 29.77 KG/M2 | TEMPERATURE: 97.8 F | HEART RATE: 96 BPM | DIASTOLIC BLOOD PRESSURE: 71 MMHG | HEIGHT: 63 IN

## 2018-06-15 DIAGNOSIS — M51.36 DDD (DEGENERATIVE DISC DISEASE), LUMBAR: ICD-10-CM

## 2018-06-15 DIAGNOSIS — S22.080A T12 COMPRESSION FRACTURE (HCC): ICD-10-CM

## 2018-06-15 DIAGNOSIS — M47.816 LUMBAR SPONDYLOSIS: Primary | ICD-10-CM

## 2018-06-15 DIAGNOSIS — M54.16 LUMBAR RADICULOPATHY: ICD-10-CM

## 2018-06-15 DIAGNOSIS — M71.38 SYNOVIAL CYST OF LUMBAR FACET JOINT: ICD-10-CM

## 2018-06-15 DIAGNOSIS — M43.16 SPONDYLOLISTHESIS AT L4-L5 LEVEL: ICD-10-CM

## 2018-06-15 DIAGNOSIS — M48.062 SPINAL STENOSIS OF LUMBAR REGION WITH NEUROGENIC CLAUDICATION: ICD-10-CM

## 2018-06-15 PROCEDURE — 99204 OFFICE O/P NEW MOD 45 MIN: CPT | Performed by: ANESTHESIOLOGY

## 2018-06-15 NOTE — PROGRESS NOTES
Assessment:  1  Lumbar radiculopathy    2  DDD (degenerative disc disease), lumbar    3  Spinal stenosis of lumbar region with neurogenic claudication    4  Spondylolisthesis at L4-L5 level        Plan:  63-year-old female presenting for initial consultation regarding a 6 month history of lumbosacral back pain that radiates into the buttocks with occasional numbness in her left foot  The patient does have lumbar degenerative disc disease of L4 and L5 with spondylosis and spondylolisthesis of L4 on L5  There is also a right-sided facet cyst at L4-5 causing central and foraminal stenosis  Of not, the facet cyst has decreased in size when compared to previous study  The patient's low back pain is her major complaint  The patient's left foot symptoms are quite intermittent and self-limiting  The patient has seen Orthopedic Spine who was kindly refer the patient for further evaluation and treatment  Surgical intervention was not required at this time  The patient was also seen by Neurosurgery as well who did not feel that surgical intervention was required at this time either  The patient was trialed on meloxicam 7 5 mg b i d  p r n , however this was causing dizziness and the patient has stopped taking this medication  She is currently taking Tylenol p r n  with mild relief  1   I will schedule the patient for bilateral L3, L4, and L5 medial branch blocks  I discussed the diagnostic nature of these blocks with the patient if she has a favorable result x2 we could proceed with RFA for longer lasting relief  The patient wished to proceed  2   We may consider bilateral L4 TFESI in the future  3  The patient will continue with her home exercise program  4    Patient may continue with Tylenol p r n   5   I will follow up the patient pending results of medial branch blocks    [place] Prescription Drug Monitoring Program report was reviewed and was appropriate     Complete risks and benefits including bleeding, infection, tissue reaction, nerve injury and allergic reaction were discussed  The approach was demonstrated using models and literature was provided  Verbal and written consent was obtained  My impressions and treatment recommendations were discussed in detail with the patient who verbalized understanding and had no further questions  Discharge instructions were provided  I personally saw and examined the patient and I agree with the above discussed plan of care  No orders of the defined types were placed in this encounter  No orders of the defined types were placed in this encounter  History of Present Illness:    Luster Goltz is a 62 y o  female presenting for initial consultation regarding a 6 month history of lumbosacral back pain that radiates into the buttocks with occasional numbness in her left foot  She denies any paresthesias or subjective weakness into her lower extremities  She does have occasional urinary incontinence, but denies any fecal incontinence  She denies any trauma or inciting event  MRI of the lumbar spine reveals a stable compression fracture of superior endplate of C87  She has degenerative disc disease and spondylosis at L4-5 and L5-S1  There is grade 1 anterolisthesis of L4 on L5 with a right-sided facet cyst at this level  She does have associated central and foraminal stenosis at this level  Of note, the facet joint cyst has decreased in size since previous study  The patient has been evaluated by Neurosurgery and Orthopedic Spine who did not feel that surgical intervention was required at this time and has been kindly referred for further evaluation and treatment by Dr Rajni Bahena  The patient has not tried any physical therapy  She has tried meloxicam which was giving her relief, however was also causing dizziness and she therefore stopped it  She has been taking Tylenol p r n  with mild relief    The patient rates her pain a 9/10 and the pain does not follow any particular pattern throughout the day  The pain is worse in the evening and described as cramping, shooting, and sharp  The pain is decreased with lying down  The pain is increased with prior, standing, bending, walking, exercise, and coughing/sneezing  I have personally reviewed and/or updated the patient's past medical history, past surgical history, family history, social history, current medications, allergies, and vital signs today  Other than as stated above, the patient denies any interval changes in medications, medical condition, mental condition, symptoms, or allergies since the last office visit  Review of Systems:    Review of Systems   Constitutional: Positive for unexpected weight change (wt gain)  Negative for fever  HENT: Negative for trouble swallowing  Eyes: Negative for visual disturbance  Respiratory: Negative for shortness of breath and wheezing  Cardiovascular: Negative for chest pain and palpitations  Gastrointestinal: Positive for constipation and nausea  Negative for diarrhea and vomiting  Endocrine: Negative for cold intolerance, heat intolerance and polydipsia  Genitourinary: Negative for difficulty urinating and frequency  Musculoskeletal: Positive for gait problem (difficulty walking/ decreased range of motion)  Negative for arthralgias, joint swelling and myalgias  Skin: Negative for rash  Neurological: Positive for dizziness  Negative for seizures, syncope, weakness and headaches  Hematological: Does not bruise/bleed easily  Psychiatric/Behavioral: Negative for dysphoric mood  All other systems reviewed and are negative        Patient Active Problem List   Diagnosis    Hyponatremia    Major depressive disorder    Lumbar radiculopathy    DDD (degenerative disc disease), lumbar    Spondylolisthesis at L4-L5 level    History of osteoporosis    Cough       Past Medical History:   Diagnosis Date    Anxiety     Back pain at L4-L5 level     Schreiber esophagus     Bulimia     Depression     Disease of thyroid gland     hypothyroid    GERD (gastroesophageal reflux disease)     Hyperlipidemia     Hypothyroidism     Leukopenia     Synovial cyst of lumbar spine     Vertigo     Weight gain        Past Surgical History:   Procedure Laterality Date    BONE MARROW BIOPSY      BREAST SURGERY      enlargement     RHINOPLASTY         Family History   Problem Relation Age of Onset    Uterine cancer Mother     Esophageal cancer Father     Colon cancer Maternal Grandmother        Social History     Occupational History    Not on file  Social History Main Topics    Smoking status: Never Smoker    Smokeless tobacco: Never Used    Alcohol use No    Drug use: No    Sexual activity: Not on file       Current Outpatient Prescriptions on File Prior to Visit   Medication Sig    bisacodyl (DULCOLAX) 5 mg EC tablet Take by mouth    diclofenac potassium (CATAFLAM) 50 mg tablet Take 0 5 tablets (25 mg total) by mouth 2 (two) times a day as needed (moderate pain)    docusate sodium (COLACE) 100 mg capsule Take 1 capsule by mouth 2 (two) times a day    ferrous sulfate 325 (65 Fe) mg tablet Take 1 tablet by mouth 2 (two) times a day    fluticasone (FLONASE) 50 mcg/act nasal spray 1 spray into each nostril daily for 14 days    levothyroxine 25 mcg tablet Take 25 mcg by mouth daily   levothyroxine 25 mcg tablet Take 1 tablet by mouth daily    levothyroxine 25 mcg tablet TAKE 1 TABLET EVERY DAY    LORazepam (ATIVAN) 2 mg tablet Take 2 mg by mouth every 6 (six) hours as needed for anxiety   LORazepam (ATIVAN) 2 mg tablet Take 1 tablet by mouth 4 (four) times a day    Methylprednisolone 4 MG TBPK 24mg PO on day 1, then decrease by 4mg/day x 5 days per dose pack instructions   omeprazole (PriLOSEC) 40 MG capsule Take 40 mg by mouth 2 (two) times a day        omeprazole (PriLOSEC) 40 MG capsule Take 80 mg by mouth daily at bedtime   omeprazole (PriLOSEC) 40 MG capsule Take 1 capsule by mouth 2 (two) times a day    PARoxetine (PAXIL) 30 mg tablet Take 30 mg by mouth every morning   PARoxetine (PAXIL) 30 mg tablet Take 1 tablet by mouth 3 (three) times a day    QUEtiapine (SEROquel) 200 mg tablet Take 200 mg by mouth 2 (two) times a day   QUEtiapine (SEROQUEL) 200 mg tablet Take 2 tablets by mouth daily at bedtime    QUEtiapine (SEROquel) 400 MG tablet Take 400 mg by mouth daily at bedtime    triamcinolone (KENALOG) 0 1 % cream     ziprasidone (GEODON) 80 mg capsule Take 80 mg by mouth daily      ziprasidone (GEODON) 80 mg capsule Take 2 capsules by mouth daily at bedtime       No current facility-administered medications on file prior to visit  Allergies   Allergen Reactions    Latex     Risperdal [Risperidone]     Zyprexa [Olanzapine]        Physical Exam:    There were no vitals taken for this visit  Constitutional: normal, well developed, well nourished, alert, in no distress and non-toxic and no overt pain behavior  Eyes: anicteric  HEENT: grossly intact  Neck: supple, symmetric, trachea midline and no masses   Pulmonary:even and unlabored  Cardiovascular:No edema or pitting edema present  Skin:Normal without rashes or lesions and well hydrated  Psychiatric:Mood and affect appropriate  Neurologic:Cranial Nerves II-XII grossly intact  Musculoskeletal:normal gait  Bilateral lumbar paraspinals tender to palpation and ropy in texture from L2-L5  Bilateral SI joints nontender to palpation  Bilateral patellar and Achilles reflexes were 2/4 and symmetrical   No clonus was noted bilaterally  Bilateral lower extremity strength 5/5 in all muscle groups  Sensation intact to light touch from the L3-S1 dermatomes bilaterally  Negative straight leg raise bilaterally  Negative Brien's test bilaterally  Imaging  2/20/18  MRI LUMBAR SPINE WITHOUT CONTRAST     INDICATION: M71 [id number]:  Other bursal cyst, other site  History taken directly from the electronic ordering system  Follow-up evaluation  Low back pain      COMPARISON:  8/15/2016     TECHNIQUE:  Sagittal T1, sagittal T2, sagittal inversion recovery, axial T1 and axial T2, coronal T2        IMAGE QUALITY:  Diagnostic     FINDINGS:     ALIGNMENT:  Mild to moderate levoscoliosis mid to lower lumbar spine is stable  Grade 1 anterolisthesis of L4 on L5 is also stable      MARROW SIGNAL:  Mild chronic superior endplate compression abnormality of T12 stable  Minimal scattered degenerative endplate changes elsewhere are stable  No bone marrow edema or focally suspicious marrow lesions      DISTAL CORD AND CONUS:  Normal size and signal within the distal cord and conus  The conus ends at the inferior endplate of I05 level      PARASPINAL SOFT TISSUES:  Prominent bladder distention is incompletely imaged with probable resultant physiologic fullness of the renal collecting systems      SACRUM:  Normal signal within the sacrum  No evidence of insufficiency or stress fracture      LOWER THORACIC DISC SPACES:  Normal disc height and signal   No disc herniation, canal stenosis or foraminal narrowing      LUMBAR DISC SPACES:     L1-L2:  Normal      L2-L3:  Normal      L3-L4:  Normal      L4-L5:  There is uncovering of the intervertebral discs space  Advanced facet hypertrophy noted  Significant interval involution of the previously present right-sided facet cyst now approximately 9 mm in maximal dimension, previously approximately 17   mm in maximal dimension  Associated mass effect on the thecal sac has diminished  There is moderate tricompartmental narrowing      L5-S1:  There is a diffuse disc bulge  There is bilateral facet hypertrophy  Minimal tricompartmental narrowing      IMPRESSION:        1     Grade 1 anterolisthesis of L4 on L5 and mild to moderate levoscoliosis mid lumbar spine stable  2    Involuting right-sided facet cyst at L4-5 with associated diminished mass effect on the thecal sac  3  Spondylotic changes elsewhere are stable  No new disc herniation  4   Prominent bladder distention and probable associated physiologic distention of the renal collecting system  Correlation for urostasis/chronic bladder outlet obstruction advised    Consider bladder ultrasound with post void residual for further   characterization, as clinically indicated         Workstation performed: [id number]      Imaging     MRI lumbar spine without contrast (Order #[id number]) on 2/20/2018 - Imaging Information   Result History     MRI lumbar spine without contrast (Order #[id number]) on 2/21/2018 - Order Result History Report

## 2018-06-15 NOTE — TELEPHONE ENCOUNTER
Patient sees Dr Juliana Romano  On 06/12 he sent a prescription for Diclofenac over to her [facility] pharmacy on file however she went there today and there was not script there for her  Can is be called into them for her?

## 2018-06-19 ENCOUNTER — OFFICE VISIT (OUTPATIENT)
Dept: FAMILY MEDICINE CLINIC | Facility: CLINIC | Age: 58
End: 2018-06-19
Payer: COMMERCIAL

## 2018-06-19 VITALS
TEMPERATURE: 97.1 F | BODY MASS INDEX: 29.77 KG/M2 | HEIGHT: 63 IN | RESPIRATION RATE: 16 BRPM | DIASTOLIC BLOOD PRESSURE: 70 MMHG | SYSTOLIC BLOOD PRESSURE: 122 MMHG | WEIGHT: 168 LBS | HEART RATE: 86 BPM

## 2018-06-19 DIAGNOSIS — E78.5 HYPERLIPIDEMIA, UNSPECIFIED HYPERLIPIDEMIA TYPE: ICD-10-CM

## 2018-06-19 DIAGNOSIS — E03.9 HYPOTHYROIDISM, UNSPECIFIED TYPE: ICD-10-CM

## 2018-06-19 DIAGNOSIS — M81.6 LOCALIZED OSTEOPOROSIS WITHOUT CURRENT PATHOLOGICAL FRACTURE: Primary | ICD-10-CM

## 2018-06-19 DIAGNOSIS — R53.83 OTHER FATIGUE: ICD-10-CM

## 2018-06-19 DIAGNOSIS — M51.36 DDD (DEGENERATIVE DISC DISEASE), LUMBAR: ICD-10-CM

## 2018-06-19 PROCEDURE — 99214 OFFICE O/P EST MOD 30 MIN: CPT | Performed by: FAMILY MEDICINE

## 2018-06-19 RX ORDER — B-COMPLEX WITH VITAMIN C
1 TABLET ORAL 2 TIMES DAILY WITH MEALS
Qty: 60 TABLET | Refills: 2 | Status: ON HOLD | OUTPATIENT
Start: 2018-06-19 | End: 2018-08-21 | Stop reason: SDUPTHER

## 2018-06-19 RX ORDER — ALENDRONATE SODIUM 70 MG/1
70 TABLET ORAL
Qty: 12 TABLET | Refills: 2 | Status: SHIPPED | OUTPATIENT
Start: 2018-06-19 | End: 2018-08-29 | Stop reason: HOSPADM

## 2018-06-19 NOTE — PATIENT INSTRUCTIONS
Osteoporosis   AMBULATORY CARE:   Osteoporosis  is a long-term medical condition that causes your bones to become weak, brittle, and more likely to fracture  Osteoporosis occurs when your body absorbs more bone than it makes  It is also caused by a lack of calcium and estrogen (female hormone)  Common symptoms include the following: You may not have any signs or symptoms  You may break a bone after a muscle strain, bump, or fall  A break usually occurs in the hip, spine, or wrist  A collapsed vertebra (bone in your spine) may cause severe back pain or loss of height from bent posture  Seek care immediately if:   · You have severe pain  Contact your healthcare provider if:   · You have increasing pain after a fall  · You have pain when you do your daily activities  · You have questions or concerns about your condition or care  Treatment for osteoporosis  may include medicines to prevent bone loss, build new bone, and increase estrogen  These medicines help prevent fractures and may be given as a pill or injection  Ask your healthcare provider for more information on these medicines  Prevent bone loss:   · Eat healthy foods that are high in calcium  This helps keep your bones strong  Good sources of calcium are milk, cheese, broccoli, tofu, almonds, and canned salmon and sardines  · Increase your vitamin D intake  Vitamin D is in fish oils, some vegetables, and fortified milk, cereal, and bread  Vitamin D is also formed in the skin when it is exposed to the sun  Ask your healthcare provider how much sunlight is safe for you  · Drink liquids as directed  Ask your healthcare provider how much liquid to drink each day and which liquids are best for you  Do not have alcohol or caffeine  They decrease bone mineral density, which can weaken your bones  · Exercise regularly  Ask your healthcare provider about the best exercise plan for you   Weight bearing exercise for 30 minutes, 3 times a week can help build and strengthen bone  · Do not smoke  Nicotine and other chemicals in cigarettes and cigars can cause lung damage  Ask your healthcare provider for information if you currently smoke and need help to quit  E-cigarettes or smokeless tobacco still contain nicotine  Talk to your healthcare provider before you use these products  · Go to physical therapy as directed  A physical therapist teaches you exercises to help improve movement and muscle strength  Follow up with your healthcare provider as directed:  Write down your questions so you remember to ask them during your visits  © 2017 [facility] Information is for End User's use only and may not be sold, redistributed or otherwise used for commercial purposes  All illustrations and images included in CareNotes® are the copyrighted property of A D [employer] M [employer]  or Stef Orellana  The above information is an  only  It is not intended as medical advice for individual conditions or treatments  Talk to your doctor, nurse or pharmacist before following any medical regimen to see if it is safe and effective for you

## 2018-06-19 NOTE — PROGRESS NOTES
Assessment/Plan     Localized osteoporosis without current pathological fracture  - T score of -2 5 of left femoral neck  - will prescribe calcium and vit D along with weekly aldendronate 75 mg  - will check CMP  - follow up in 3 months    DDD (degenerative disc disease), lumbar  - patient is seeing pain management for spinal injections  -advised tylenol 500-1000 mg TID  - patient declined PT at this time, she would like to discuss with pain management doctor first  -follow up as needed     Hypothyroidism  - continue levothyroxine 25 mcq, will check TSH     Hyperlipidemia  - will check lipid panel, and prescribe statin if warranted       Other fatigue  - likely secondary to depression and poor sleep hygiene  - counseled on proper sleep hygiene and taking seroqul earlier to help aid her sleep  - will check CMP and CBC   - follow up in 2-3 months      Diagnoses and all orders for this visit:    Localized osteoporosis without current pathological fracture  -     calcium carbonate-vitamin D (OSCAL-D) 500 mg-200 units per tablet; Take 1 tablet by mouth 2 (two) times a day with meals for 30 days  -     alendronate (FOSAMAX) 70 mg tablet; Take 1 tablet (70 mg total) by mouth every 7 days for 90 days    DDD (degenerative disc disease), lumbar    Hypothyroidism, unspecified type  -     CBC and differential; Future  -     Comprehensive metabolic panel; Future  -     TSH, 3rd generation; Future    Other fatigue  -     CBC and differential; Future  -     Comprehensive metabolic panel; Future    Hyperlipidemia, unspecified hyperlipidemia type  -     Lipid Panel with Direct LDL reflex; Future         Subjective     Chief Complaint: Follow up for Dexa Results     HPI:   This is a 63 yo F who presents to discuss dexa scan results  Reviewing her Dexa Results with the patient she was found to have osteoporosis with t score of -2 5 in left femoral hip   In review of her history she had osteoporosis many years ago and was not previously treated with bisphosphonate  She had a previous compression fracture of her spine  She has degenerative disc disease, which she goes to spine [facility] pain center  She is getting spinal injections  She is not taking daily pain medications  She tried tylenol, once a day, occasionally, that does not improve her symptoms  She never had physical therapy  She reports daily fatigue, this makes it difficult to help her 81 yo Mother care  She reports going to bed at 1 am and getting up at 7am, she reports she is "worked up and feels anxious" prior to bed  She takes seroquel at 1 am and this helps her sleep  The following portions of the patient's history were reviewed and updated as appropriate: allergies, current medications, past family history, past medical history, past social history, past surgical history and problem list     Review of Systems  Review of Systems   Constitutional: Positive for fatigue  Negative for chills and fever  Respiratory: Negative for cough and shortness of breath  Cardiovascular: Negative for chest pain and palpitations  Gastrointestinal: Positive for constipation  Negative for abdominal pain, blood in stool, diarrhea, nausea and vomiting  Genitourinary: Negative for difficulty urinating  Musculoskeletal:        As noted in HPI          Objective   Vitals:    06/19/18 1516   BP: 122/70   Pulse: 86   Resp: 16   Temp: (!) 97 1 °F (36 2 °C)       Physical Exam   Constitutional: She is oriented to person, place, and time  She appears well-developed and well-nourished  HENT:   Mouth/Throat: Oropharynx is clear and moist    Eyes: Conjunctivae are normal    Neck: Neck supple  Cardiovascular: Normal rate, regular rhythm and normal heart sounds  No murmur heard  Pulmonary/Chest: Effort normal and breath sounds normal  No respiratory distress  She has no wheezes  She has no rales  Abdominal: Soft  Bowel sounds are normal  She exhibits no distension  There is no tenderness  Neurological: She is alert and oriented to person, place, and time  Skin: Skin is warm and dry  Psychiatric: She has a normal mood and affect  Lab Results: I have reviewed all the lab results

## 2018-06-19 NOTE — ASSESSMENT & PLAN NOTE
- T score of -2 5 of left femoral neck  - will prescribe calcium and vit D along with weekly aldendronate 75 mg  - will check CMP  - follow up in 3 months

## 2018-06-19 NOTE — ASSESSMENT & PLAN NOTE
- patient is seeing pain management for spinal injections  -advised tylenol 500-1000 mg TID  - patient declined PT at this time, she would like to discuss with pain management doctor first  -follow up as needed

## 2018-06-19 NOTE — ASSESSMENT & PLAN NOTE
- likely secondary to depression and poor sleep hygiene  - counseled on proper sleep hygiene and taking seroqul earlier to help aid her sleep  - will check CMP and CBC   - follow up in 2-3 months

## 2018-06-20 DIAGNOSIS — K21.9 GASTROESOPHAGEAL REFLUX DISEASE WITHOUT ESOPHAGITIS: Primary | ICD-10-CM

## 2018-06-20 RX ORDER — OMEPRAZOLE 40 MG/1
CAPSULE, DELAYED RELEASE ORAL
Qty: 60 CAPSULE | Refills: 5 | Status: SHIPPED | OUTPATIENT
Start: 2018-06-20 | End: 2018-08-29 | Stop reason: HOSPADM

## 2018-06-22 ENCOUNTER — TELEPHONE (OUTPATIENT)
Dept: FAMILY MEDICINE CLINIC | Facility: CLINIC | Age: 58
End: 2018-06-22

## 2018-06-25 DIAGNOSIS — D50.9 IRON DEFICIENCY ANEMIA, UNSPECIFIED IRON DEFICIENCY ANEMIA TYPE: Primary | ICD-10-CM

## 2018-06-25 RX ORDER — FERROUS SULFATE 325(65) MG
1 TABLET ORAL 2 TIMES DAILY
Qty: 180 TABLET | Refills: 0 | Status: SHIPPED | OUTPATIENT
Start: 2018-06-25 | End: 2018-09-22 | Stop reason: SDUPTHER

## 2018-07-01 DIAGNOSIS — M51.36 DDD (DEGENERATIVE DISC DISEASE), LUMBAR: ICD-10-CM

## 2018-07-02 RX ORDER — DICLOFENAC POTASSIUM 50 MG/1
TABLET, FILM COATED ORAL
Qty: 40 TABLET | Refills: 0 | Status: SHIPPED | OUTPATIENT
Start: 2018-07-02 | End: 2018-08-29 | Stop reason: HOSPADM

## 2018-07-11 ENCOUNTER — HOSPITAL ENCOUNTER (OUTPATIENT)
Dept: RADIOLOGY | Facility: CLINIC | Age: 58
Discharge: HOME | End: 2018-07-11
Admitting: ANESTHESIOLOGY
Payer: COMMERCIAL

## 2018-07-11 VITALS
DIASTOLIC BLOOD PRESSURE: 87 MMHG | OXYGEN SATURATION: 97 % | SYSTOLIC BLOOD PRESSURE: 122 MMHG | RESPIRATION RATE: 20 BRPM | TEMPERATURE: 97.6 F | HEART RATE: 77 BPM

## 2018-07-11 DIAGNOSIS — E03.9 ACQUIRED HYPOTHYROIDISM: ICD-10-CM

## 2018-07-11 DIAGNOSIS — M47.816 LUMBAR SPONDYLOSIS: ICD-10-CM

## 2018-07-11 PROCEDURE — 64494 INJ PARAVERT F JNT L/S 2 LEV: CPT | Performed by: ANESTHESIOLOGY

## 2018-07-11 PROCEDURE — 64493 INJ PARAVERT F JNT L/S 1 LEV: CPT | Performed by: ANESTHESIOLOGY

## 2018-07-11 RX ORDER — LEVOTHYROXINE SODIUM 0.03 MG/1
TABLET ORAL
Qty: 30 TABLET | Refills: 2 | OUTPATIENT
Start: 2018-07-11

## 2018-07-11 RX ORDER — LIDOCAINE HYDROCHLORIDE 10 MG/ML
10 INJECTION, SOLUTION EPIDURAL; INFILTRATION; INTRACAUDAL; PERINEURAL ONCE
Status: COMPLETED | OUTPATIENT
Start: 2018-07-11 | End: 2018-07-11

## 2018-07-11 RX ADMIN — LIDOCAINE HYDROCHLORIDE 7 ML: 10 INJECTION, SOLUTION EPIDURAL; INFILTRATION; INTRACAUDAL; PERINEURAL at 11:15

## 2018-07-11 RX ADMIN — LIDOCAINE HYDROCHLORIDE 3 ML: 20 INJECTION, SOLUTION EPIDURAL; INFILTRATION; INTRACAUDAL; PERINEURAL at 11:18

## 2018-07-11 NOTE — H&P
History of Present Illness: The patient is a 62 y o  female who presents with complaints of low back pain      Patient Active Problem List   Diagnosis    Hyponatremia    Major depressive disorder    Lumbar radiculopathy    DDD (degenerative disc disease), lumbar    Spondylolisthesis at L4-L5 level    Localized osteoporosis without current pathological fracture    Cough    Spinal stenosis of lumbar region with neurogenic claudication    Lumbar spondylosis    T12 compression fracture (HCC)    Synovial cyst of lumbar facet joint    Anxiety    Bulimia nervosa    Constipation    Esophageal reflux    Hyperlipidemia    Hypothyroidism    Schizoaffective disorder, depressive type ([address] )    Other fatigue       Past Medical History:   Diagnosis Date    Anxiety     Back pain at L4-L5 level     Schreiber esophagus     Bulimia     Depression     Disease of thyroid gland     hypothyroid    GERD (gastroesophageal reflux disease)     Hyperlipidemia     Hypothyroidism     Leukopenia     Synovial cyst of lumbar spine     Vertigo     Weight gain        Past Surgical History:   Procedure Laterality Date    BONE MARROW BIOPSY      BREAST SURGERY      enlargement     RHINOPLASTY           Current Outpatient Prescriptions:     alendronate (FOSAMAX) 70 mg tablet, Take 1 tablet (70 mg total) by mouth every 7 days for 90 days, Disp: 12 tablet, Rfl: 2    bisacodyl (DULCOLAX) 5 mg EC tablet, Take by mouth, Disp: , Rfl:     calcium carbonate-vitamin D (OSCAL-D) 500 mg-200 units per tablet, Take 1 tablet by mouth 2 (two) times a day with meals for 30 days, Disp: 60 tablet, Rfl: 2    diclofenac potassium (CATAFLAM) 50 mg tablet, TAKE 1 TABLET BY MOUTH TWICE A DAY AS NEEDED, Disp: 40 tablet, Rfl: 0    docusate sodium (COLACE) 100 mg capsule, Take 1 capsule by mouth 2 (two) times a day, Disp: , Rfl:     ferrous sulfate 325 (65 Fe) mg tablet, Take 1 tablet (325 mg total) by mouth 2 (two) times a day for 90 days, Disp: 180 tablet, Rfl: 0    fluticasone (FLONASE) 50 mcg/act nasal spray, 1 spray into each nostril daily for 14 days, Disp: 1 Bottle, Rfl: 0    levothyroxine 25 mcg tablet, Take 25 mcg by mouth daily  , Disp: , Rfl:     levothyroxine 25 mcg tablet, Take 1 tablet by mouth daily, Disp: , Rfl:     levothyroxine 25 mcg tablet, TAKE 1 TABLET EVERY DAY, Disp: 30 tablet, Rfl: 2    LORazepam (ATIVAN) 2 mg tablet, Take 2 mg by mouth every 6 (six) hours as needed for anxiety  , Disp: , Rfl:     LORazepam (ATIVAN) 2 mg tablet, Take 1 tablet by mouth 4 (four) times a day, Disp: , Rfl:     Methylprednisolone 4 MG TBPK, 24mg PO on day 1, then decrease by 4mg/day x 5 days per dose pack instructions  , Disp: 21 tablet, Rfl: 0    omeprazole (PriLOSEC) 40 MG capsule, Take 40 mg by mouth 2 (two) times a day   , Disp: , Rfl:     omeprazole (PriLOSEC) 40 MG capsule, Take 80 mg by mouth daily at bedtime  , Disp: , Rfl:     omeprazole (PriLOSEC) 40 MG capsule, Take 1 capsule by mouth 2 (two) times a day, Disp: , Rfl:     omeprazole (PriLOSEC) 40 MG capsule, TAKE 1 CAPSULE TWICE DAILY  , Disp: 60 capsule, Rfl: 5    PARoxetine (PAXIL) 30 mg tablet, Take 30 mg by mouth every morning , Disp: , Rfl:     PARoxetine (PAXIL) 30 mg tablet, Take 1 tablet by mouth 3 (three) times a day, Disp: , Rfl:     QUEtiapine (SEROquel) 200 mg tablet, Take 200 mg by mouth 2 (two) times a day   , Disp: , Rfl:     QUEtiapine (SEROQUEL) 200 mg tablet, Take 2 tablets by mouth daily at bedtime, Disp: , Rfl:     QUEtiapine (SEROquel) 400 MG tablet, Take 400 mg by mouth daily at bedtime, Disp: , Rfl: 5    triamcinolone (KENALOG) 0 1 % cream, , Disp: , Rfl:     ziprasidone (GEODON) 80 mg capsule, Take 80 mg by mouth daily  , Disp: , Rfl:     ziprasidone (GEODON) 80 mg capsule, Take 2 capsules by mouth daily at bedtime  , Disp: , Rfl:     Allergies   Allergen Reactions    Latex     Risperdal [Risperidone]     Zyprexa [Olanzapine]        Physical Exam: Vitals:    07/11/18 1042   BP: 139/86   Pulse: 80   Resp: 18   Temp: 97 6 °F (36 4 °C)   SpO2: 96%     General: Awake, Alert, Oriented x 3, Mood and affect appropriate  Respiratory: Respirations even and unlabored  Cardiovascular: Peripheral pulses intact; no edema  Musculoskeletal Exam:  Bilateral lumbar paraspinals tender to palpation  ASA Score: 2    Patient/Chart Verification  Patient ID Verified: Verbal  ID Band Applied: No  Consents Confirmed: Procedural, To be obtained in the Pre-Procedure area  H&P( within 30 days) Verified: To be obtained in the Pre-Procedure area  Interval H&P(within 24 hr) Complete (required for Outpatients and Surgery Admit only): To be obtained in the Pre-Procedure area  Allergies Reviewed: Yes  Anticoag/NSAID held?: NA  Currently on antibiotics?: No    Assessment:   1   Lumbar spondylosis        Plan: lumbar spondylosis - Bilateral L3, L4, L5 MBB#1

## 2018-07-11 NOTE — DISCHARGE INSTRUCTIONS

## 2018-07-15 DIAGNOSIS — E03.9 ACQUIRED HYPOTHYROIDISM: ICD-10-CM

## 2018-07-16 ENCOUNTER — TELEPHONE (OUTPATIENT)
Dept: RADIOLOGY | Facility: CLINIC | Age: 58
End: 2018-07-16

## 2018-07-16 RX ORDER — LEVOTHYROXINE SODIUM 0.03 MG/1
TABLET ORAL
Qty: 30 TABLET | Refills: 2 | Status: SHIPPED | OUTPATIENT
Start: 2018-07-16 | End: 2018-08-29 | Stop reason: HOSPADM

## 2018-07-16 NOTE — TELEPHONE ENCOUNTER
Pt calling back, stating that she has not heard from anyone yet  She would like to speak with someone at the office, because she is still having a lot of pain  All the pain that went away after the procedure has come back now  Pt can be reached at [phone]

## 2018-07-16 NOTE — TELEPHONE ENCOUNTER
Pt called and LMOM asking if her pain diary was reviewed from 07/11/18- she wants to know what the next step is     (pain diary was dropped off on Thurs 07/12)

## 2018-07-17 NOTE — TELEPHONE ENCOUNTER
S/w the patient this am  and she stated that she had 90% relief until about 2000 that night  Were you able to review the previous pain diary? Would you like us to schedule the MBB #2?   JW to advise   Thanks

## 2018-07-17 NOTE — TELEPHONE ENCOUNTER
The patient had a favorable result to medial branch block 1  Please call the patient to schedule medial branch block 2

## 2018-07-17 NOTE — TELEPHONE ENCOUNTER
Called pt, scheduled B/L L3, L4, L5 MBB#2 on Wed 07/25/18 arriving at 07:45am     Went over pre procedure instructions, NPO 1 hr prior, if sick or on abx needs to call to rs, wear loose, comf clothing- no buttons/zippers, needs   Also went over MBB preop instructions, no PRN pain meds, pain level must be 5/10 or greater  Pt verbalized understanding

## 2018-07-25 ENCOUNTER — HOSPITAL ENCOUNTER (OUTPATIENT)
Dept: RADIOLOGY | Facility: CLINIC | Age: 58
Discharge: HOME | End: 2018-07-25
Admitting: ANESTHESIOLOGY
Payer: COMMERCIAL

## 2018-07-25 VITALS
DIASTOLIC BLOOD PRESSURE: 82 MMHG | RESPIRATION RATE: 20 BRPM | HEART RATE: 83 BPM | TEMPERATURE: 97.7 F | OXYGEN SATURATION: 96 % | SYSTOLIC BLOOD PRESSURE: 121 MMHG

## 2018-07-25 DIAGNOSIS — M47.816 LUMBAR SPONDYLOSIS: ICD-10-CM

## 2018-07-25 PROCEDURE — 64493 INJ PARAVERT F JNT L/S 1 LEV: CPT | Performed by: ANESTHESIOLOGY

## 2018-07-25 PROCEDURE — 64494 INJ PARAVERT F JNT L/S 2 LEV: CPT | Performed by: ANESTHESIOLOGY

## 2018-07-25 RX ORDER — LIDOCAINE HYDROCHLORIDE 10 MG/ML
10 INJECTION, SOLUTION EPIDURAL; INFILTRATION; INTRACAUDAL; PERINEURAL ONCE
Status: COMPLETED | OUTPATIENT
Start: 2018-07-25 | End: 2018-07-25

## 2018-07-25 RX ORDER — BUPIVACAINE HYDROCHLORIDE 5 MG/ML
30 INJECTION, SOLUTION EPIDURAL; INTRACAUDAL ONCE
Status: COMPLETED | OUTPATIENT
Start: 2018-07-25 | End: 2018-07-25

## 2018-07-25 RX ADMIN — LIDOCAINE HYDROCHLORIDE 6 ML: 10 INJECTION, SOLUTION EPIDURAL; INFILTRATION; INTRACAUDAL; PERINEURAL at 08:40

## 2018-07-25 RX ADMIN — BUPIVACAINE HYDROCHLORIDE 3 ML: 5 INJECTION, SOLUTION EPIDURAL; INTRACAUDAL at 08:45

## 2018-07-25 NOTE — DISCHARGE INSTR - LAB

## 2018-07-25 NOTE — H&P
History of Present Illness: The patient is a 62 y o  female who presents with complaints of low back pain      Patient Active Problem List   Diagnosis    Hyponatremia    Major depressive disorder    Lumbar radiculopathy    DDD (degenerative disc disease), lumbar    Spondylolisthesis at L4-L5 level    Localized osteoporosis without current pathological fracture    Cough    Spinal stenosis of lumbar region with neurogenic claudication    Lumbar spondylosis    T12 compression fracture (HCC)    Synovial cyst of lumbar facet joint    Anxiety    Bulimia nervosa    Constipation    Esophageal reflux    Hyperlipidemia    Hypothyroidism    Schizoaffective disorder, depressive type ([address] )    Other fatigue       Past Medical History:   Diagnosis Date    Anxiety     Back pain at L4-L5 level     Schreiber esophagus     Bulimia     Depression     Disease of thyroid gland     hypothyroid    GERD (gastroesophageal reflux disease)     Hyperlipidemia     Hypothyroidism     Leukopenia     Synovial cyst of lumbar spine     Vertigo     Weight gain        Past Surgical History:   Procedure Laterality Date    BONE MARROW BIOPSY      BREAST SURGERY      enlargement     RHINOPLASTY           Current Outpatient Prescriptions:     alendronate (FOSAMAX) 70 mg tablet, Take 1 tablet (70 mg total) by mouth every 7 days for 90 days, Disp: 12 tablet, Rfl: 2    bisacodyl (DULCOLAX) 5 mg EC tablet, Take by mouth, Disp: , Rfl:     calcium carbonate-vitamin D (OSCAL-D) 500 mg-200 units per tablet, Take 1 tablet by mouth 2 (two) times a day with meals for 30 days, Disp: 60 tablet, Rfl: 2    diclofenac potassium (CATAFLAM) 50 mg tablet, TAKE 1 TABLET BY MOUTH TWICE A DAY AS NEEDED, Disp: 40 tablet, Rfl: 0    docusate sodium (COLACE) 100 mg capsule, Take 1 capsule by mouth 2 (two) times a day, Disp: , Rfl:     ferrous sulfate 325 (65 Fe) mg tablet, Take 1 tablet (325 mg total) by mouth 2 (two) times a day for 90 days, Disp: 180 tablet, Rfl: 0    fluticasone (FLONASE) 50 mcg/act nasal spray, 1 spray into each nostril daily for 14 days, Disp: 1 Bottle, Rfl: 0    levothyroxine 25 mcg tablet, Take 25 mcg by mouth daily  , Disp: , Rfl:     levothyroxine 25 mcg tablet, Take 1 tablet by mouth daily, Disp: , Rfl:     levothyroxine 25 mcg tablet, TAKE 1 TABLET EVERY DAY, Disp: 30 tablet, Rfl: 2    LORazepam (ATIVAN) 2 mg tablet, Take 2 mg by mouth every 6 (six) hours as needed for anxiety  , Disp: , Rfl:     LORazepam (ATIVAN) 2 mg tablet, Take 1 tablet by mouth 4 (four) times a day, Disp: , Rfl:     Methylprednisolone 4 MG TBPK, 24mg PO on day 1, then decrease by 4mg/day x 5 days per dose pack instructions  , Disp: 21 tablet, Rfl: 0    omeprazole (PriLOSEC) 40 MG capsule, Take 40 mg by mouth 2 (two) times a day   , Disp: , Rfl:     omeprazole (PriLOSEC) 40 MG capsule, Take 80 mg by mouth daily at bedtime  , Disp: , Rfl:     omeprazole (PriLOSEC) 40 MG capsule, Take 1 capsule by mouth 2 (two) times a day, Disp: , Rfl:     omeprazole (PriLOSEC) 40 MG capsule, TAKE 1 CAPSULE TWICE DAILY  , Disp: 60 capsule, Rfl: 5    PARoxetine (PAXIL) 30 mg tablet, Take 30 mg by mouth every morning , Disp: , Rfl:     PARoxetine (PAXIL) 30 mg tablet, Take 1 tablet by mouth 3 (three) times a day, Disp: , Rfl:     QUEtiapine (SEROquel) 200 mg tablet, Take 200 mg by mouth 2 (two) times a day   , Disp: , Rfl:     QUEtiapine (SEROQUEL) 200 mg tablet, Take 2 tablets by mouth daily at bedtime, Disp: , Rfl:     QUEtiapine (SEROquel) 400 MG tablet, Take 400 mg by mouth daily at bedtime, Disp: , Rfl: 5    triamcinolone (KENALOG) 0 1 % cream, , Disp: , Rfl:     ziprasidone (GEODON) 80 mg capsule, Take 80 mg by mouth daily  , Disp: , Rfl:     ziprasidone (GEODON) 80 mg capsule, Take 2 capsules by mouth daily at bedtime  , Disp: , Rfl:     Current Facility-Administered Medications:     lidocaine (PF) (XYLOCAINE-MPF) 1 % injection 10 mL, 10 mL, Intra-articular, Once, Tyson Sage, DO    lidocaine (PF) (XYLOCAINE-MPF) 2 % injection 5 mL, 5 mL, Intra-articular, Once, Tigre Zimmerman, DO    Allergies   Allergen Reactions    Latex     Risperdal [Risperidone]     Zyprexa [Olanzapine]        Physical Exam:   Vitals:    07/25/18 0812   BP: 96/69   Pulse: 89   Resp: 20   Temp: 97 7 °F (36 5 °C)   SpO2: 96%     General: Awake, Alert, Oriented x 3, Mood and affect appropriate  Respiratory: Respirations even and unlabored  Cardiovascular: Peripheral pulses intact; no edema  Musculoskeletal Exam:  Bilateral lumbar paraspinals tender to palpation  ASA Score: 2    Patient/Chart Verification  Patient ID Verified: Verbal  ID Band Applied: No  Consents Confirmed: Procedural, To be obtained in the Pre-Procedure area  H&P( within 30 days) Verified: To be obtained in the Pre-Procedure area  Interval H&P(within 24 hr) Complete (required for Outpatients and Surgery Admit only): To be obtained in the Pre-Procedure area  Allergies Reviewed: Yes (latex)  Anticoag/NSAID held?: No  Currently on antibiotics?: No    Assessment:   1   Lumbar spondylosis        Plan: B/L L3, L4, L5 MBB#2

## 2018-07-31 ENCOUNTER — TELEPHONE (OUTPATIENT)
Dept: PAIN MEDICINE | Facility: MEDICAL CENTER | Age: 58
End: 2018-07-31

## 2018-07-31 NOTE — TELEPHONE ENCOUNTER
Pt is calling asking if Dr Jcarlos Pearl has had the chance to look over her pain diary that she brought in last week 7/26  Pt would like to schedule another procedure   Pt can be reached at [phone]

## 2018-08-01 NOTE — TELEPHONE ENCOUNTER
Yes   The patient had a favorable result to medial branch block 2  Please schedule the patient for L3, L4, L5 RFA  Please ask the patient which side she would like to start with 1st and and please schedule opposite side for 2 weeks later

## 2018-08-03 NOTE — TELEPHONE ENCOUNTER
Called pt, scheduled RT L3, L4, L5 RFA on Tues 08/14/18 and LT L3, L4, L5 RFA on Tues 08/28/18, both arriving at 08:30am     Went over pre procedure instructions, NPO 1 hr prior, if sick or on abx needs to call to rs, wear loose, comf clothing- no buttons/zippers, needs   Pt verbalized understanding  Mailed pre op instructions to pt

## 2018-08-13 ENCOUNTER — APPOINTMENT (INPATIENT)
Dept: NEUROLOGY | Facility: AMBULATORY SURGERY CENTER | Age: 58
DRG: 917 | End: 2018-08-13
Payer: COMMERCIAL

## 2018-08-13 ENCOUNTER — HOSPITAL ENCOUNTER (INPATIENT)
Facility: HOSPITAL | Age: 58
LOS: 7 days | DRG: 917 | End: 2018-08-20
Attending: EMERGENCY MEDICINE | Admitting: INTERNAL MEDICINE
Payer: COMMERCIAL

## 2018-08-13 ENCOUNTER — APPOINTMENT (EMERGENCY)
Dept: RADIOLOGY | Facility: HOSPITAL | Age: 58
DRG: 917 | End: 2018-08-13
Payer: COMMERCIAL

## 2018-08-13 ENCOUNTER — APPOINTMENT (INPATIENT)
Dept: RADIOLOGY | Facility: HOSPITAL | Age: 58
DRG: 917 | End: 2018-08-13
Payer: COMMERCIAL

## 2018-08-13 DIAGNOSIS — R56.9 SEIZURE (HCC): ICD-10-CM

## 2018-08-13 DIAGNOSIS — E87.1 HYPONATREMIA: Primary | ICD-10-CM

## 2018-08-13 DIAGNOSIS — F32.9 MAJOR DEPRESSIVE DISORDER: ICD-10-CM

## 2018-08-13 DIAGNOSIS — R25.8 CLONUS: ICD-10-CM

## 2018-08-13 DIAGNOSIS — T43.501A: ICD-10-CM

## 2018-08-13 PROBLEM — G25.79 SEROTONIN SYNDROME: Status: ACTIVE | Noted: 2018-08-13

## 2018-08-13 PROBLEM — E87.2 METABOLIC ACIDOSIS, INCREASED ANION GAP: Status: ACTIVE | Noted: 2018-08-13

## 2018-08-13 PROBLEM — G40.901 STATUS EPILEPTICUS (HCC): Status: ACTIVE | Noted: 2018-08-13

## 2018-08-13 PROBLEM — J96.00 ACUTE RESPIRATORY FAILURE (HCC): Status: ACTIVE | Noted: 2018-08-13

## 2018-08-13 PROBLEM — G90.81 SEROTONIN SYNDROME: Status: ACTIVE | Noted: 2018-08-13

## 2018-08-13 PROBLEM — R00.0 TACHYCARDIA: Status: ACTIVE | Noted: 2018-08-13

## 2018-08-13 PROBLEM — R41.0 DELIRIUM: Status: ACTIVE | Noted: 2018-08-13

## 2018-08-13 PROBLEM — M62.82 NON-TRAUMATIC RHABDOMYOLYSIS: Status: ACTIVE | Noted: 2018-08-13

## 2018-08-13 PROBLEM — R50.9 HYPERTHERMIA: Status: ACTIVE | Noted: 2018-08-13

## 2018-08-13 PROBLEM — E87.29 METABOLIC ACIDOSIS, INCREASED ANION GAP: Status: ACTIVE | Noted: 2018-08-13

## 2018-08-13 LAB
ALBUMIN SERPL BCP-MCNC: 4.1 G/DL (ref 3.5–5)
ALP SERPL-CCNC: 75 U/L (ref 46–116)
ALT SERPL W P-5'-P-CCNC: 34 U/L (ref 12–78)
AMPHETAMINES SERPL QL SCN: NEGATIVE
ANION GAP BLD CALC-SCNC: 20 MMOL/L (ref 4–13)
ANION GAP SERPL CALCULATED.3IONS-SCNC: 11 MMOL/L (ref 4–13)
ANION GAP SERPL CALCULATED.3IONS-SCNC: 15 MMOL/L (ref 4–13)
ANION GAP SERPL CALCULATED.3IONS-SCNC: 16 MMOL/L (ref 4–13)
ANION GAP SERPL CALCULATED.3IONS-SCNC: 17 MMOL/L (ref 4–13)
APAP SERPL-MCNC: <2 UG/ML (ref 10–30)
APTT PPP: 27 SECONDS (ref 24–36)
AST SERPL W P-5'-P-CCNC: 120 U/L (ref 5–45)
ATRIAL RATE: 107 BPM
ATRIAL RATE: 117 BPM
ATRIAL RATE: 241 BPM
BACTERIA UR QL AUTO: NORMAL /HPF
BARBITURATES UR QL: NEGATIVE
BASE EXCESS BLDA CALC-SCNC: -4 MMOL/L (ref -2–3)
BASE EXCESS BLDA CALC-SCNC: 3 MMOL/L (ref -2–3)
BASOPHILS # BLD AUTO: 0.02 THOUSANDS/ΜL (ref 0–0.1)
BASOPHILS NFR BLD AUTO: 0 % (ref 0–1)
BENZODIAZ UR QL: NEGATIVE
BILIRUB SERPL-MCNC: 1.09 MG/DL (ref 0.2–1)
BILIRUB UR QL STRIP: NEGATIVE
BUN BLD-MCNC: 14 MG/DL (ref 5–25)
BUN SERPL-MCNC: 13 MG/DL (ref 5–25)
BUN SERPL-MCNC: 14 MG/DL (ref 5–25)
BUN SERPL-MCNC: 14 MG/DL (ref 5–25)
BUN SERPL-MCNC: 15 MG/DL (ref 5–25)
CA-I BLD-SCNC: 0.9 MMOL/L (ref 1.12–1.32)
CA-I BLD-SCNC: 0.92 MMOL/L (ref 1.12–1.32)
CA-I BLD-SCNC: 0.94 MMOL/L (ref 1.12–1.32)
CALCIUM SERPL-MCNC: 7.5 MG/DL (ref 8.3–10.1)
CALCIUM SERPL-MCNC: 8.2 MG/DL (ref 8.3–10.1)
CALCIUM SERPL-MCNC: 8.4 MG/DL (ref 8.3–10.1)
CALCIUM SERPL-MCNC: 8.7 MG/DL (ref 8.3–10.1)
CHLORIDE BLD-SCNC: 73 MMOL/L (ref 100–108)
CHLORIDE SERPL-SCNC: 75 MMOL/L (ref 100–108)
CHLORIDE SERPL-SCNC: 78 MMOL/L (ref 100–108)
CHLORIDE SERPL-SCNC: 80 MMOL/L (ref 100–108)
CHLORIDE SERPL-SCNC: 84 MMOL/L (ref 100–108)
CK MB SERPL-MCNC: 1 % (ref 0–2.5)
CK MB SERPL-MCNC: 1.7 % (ref 0–2.5)
CK MB SERPL-MCNC: 140.3 NG/ML (ref 0–5)
CK MB SERPL-MCNC: 96.8 NG/ML (ref 0–5)
CK SERPL-CCNC: 5732 U/L (ref 26–192)
CK SERPL-CCNC: ABNORMAL U/L (ref 26–192)
CLARITY UR: CLEAR
CO2 SERPL-SCNC: 18 MMOL/L (ref 21–32)
CO2 SERPL-SCNC: 21 MMOL/L (ref 21–32)
CO2 SERPL-SCNC: 24 MMOL/L (ref 21–32)
CO2 SERPL-SCNC: 25 MMOL/L (ref 21–32)
COCAINE UR QL: NEGATIVE
COLOR UR: YELLOW
CREAT BLD-MCNC: 0.7 MG/DL (ref 0.6–1.3)
CREAT SERPL-MCNC: 0.71 MG/DL (ref 0.6–1.3)
CREAT SERPL-MCNC: 0.72 MG/DL (ref 0.6–1.3)
CREAT SERPL-MCNC: 0.85 MG/DL (ref 0.6–1.3)
CREAT SERPL-MCNC: 1 MG/DL (ref 0.6–1.3)
EOSINOPHIL # BLD AUTO: 0.02 THOUSAND/ΜL (ref 0–0.61)
EOSINOPHIL NFR BLD AUTO: 0 % (ref 0–6)
ERYTHROCYTE [DISTWIDTH] IN BLOOD BY AUTOMATED COUNT: 13.1 % (ref 11.6–15.1)
ETHANOL SERPL-MCNC: <3 MG/DL (ref 0–3)
GFR SERPL CREATININE-BSD FRML MDRD: 63 ML/MIN/1.73SQ M
GFR SERPL CREATININE-BSD FRML MDRD: 76 ML/MIN/1.73SQ M
GFR SERPL CREATININE-BSD FRML MDRD: 93 ML/MIN/1.73SQ M
GFR SERPL CREATININE-BSD FRML MDRD: 95 ML/MIN/1.73SQ M
GFR SERPL CREATININE-BSD FRML MDRD: 96 ML/MIN/1.73SQ M
GLUCOSE SERPL-MCNC: 100 MG/DL (ref 65–140)
GLUCOSE SERPL-MCNC: 101 MG/DL (ref 65–140)
GLUCOSE SERPL-MCNC: 102 MG/DL (ref 65–140)
GLUCOSE SERPL-MCNC: 109 MG/DL (ref 65–140)
GLUCOSE SERPL-MCNC: 113 MG/DL (ref 65–140)
GLUCOSE SERPL-MCNC: 117 MG/DL (ref 65–140)
GLUCOSE SERPL-MCNC: 122 MG/DL (ref 65–140)
GLUCOSE SERPL-MCNC: 125 MG/DL (ref 65–140)
GLUCOSE SERPL-MCNC: 90 MG/DL (ref 65–140)
GLUCOSE UR STRIP-MCNC: NEGATIVE MG/DL
HCO3 BLDA-SCNC: 19.8 MMOL/L (ref 24–30)
HCO3 BLDA-SCNC: 23.5 MMOL/L (ref 24–30)
HCT VFR BLD AUTO: 36.5 % (ref 34.8–46.1)
HCT VFR BLD CALC: 39 % (ref 34.8–46.1)
HCT VFR BLD CALC: 40 % (ref 34.8–46.1)
HCT VFR BLD CALC: 42 % (ref 34.8–46.1)
HGB BLD-MCNC: 13.5 G/DL (ref 11.5–15.4)
HGB BLD-MCNC: 14 G/DL (ref 11.5–15.4)
HGB BLDA-MCNC: 13.3 G/DL (ref 11.5–15.4)
HGB BLDA-MCNC: 13.6 G/DL (ref 11.5–15.4)
HGB BLDA-MCNC: 14.3 G/DL (ref 11.5–15.4)
HGB UR QL STRIP.AUTO: ABNORMAL
HYALINE CASTS #/AREA URNS LPF: NORMAL /LPF
IMM GRANULOCYTES # BLD AUTO: 0.05 THOUSAND/UL (ref 0–0.2)
IMM GRANULOCYTES NFR BLD AUTO: 1 % (ref 0–2)
INR PPP: 1 (ref 0.86–1.17)
KETONES UR STRIP-MCNC: ABNORMAL MG/DL
LACTATE SERPL-SCNC: 2.3 MMOL/L (ref 0.5–2)
LACTATE SERPL-SCNC: 3.9 MMOL/L (ref 0.5–2)
LACTATE SERPL-SCNC: 8.1 MMOL/L (ref 0.5–2)
LEUKOCYTE ESTERASE UR QL STRIP: NEGATIVE
LYMPHOCYTES # BLD AUTO: 0.69 THOUSANDS/ΜL (ref 0.6–4.47)
LYMPHOCYTES NFR BLD AUTO: 7 % (ref 14–44)
MCH RBC QN AUTO: 29.7 PG (ref 26.8–34.3)
MCHC RBC AUTO-ENTMCNC: 37 G/DL (ref 31.4–37.4)
MCV RBC AUTO: 80 FL (ref 82–98)
METHADONE UR QL: NEGATIVE
MONOCYTES # BLD AUTO: 0.52 THOUSAND/ΜL (ref 0.17–1.22)
MONOCYTES NFR BLD AUTO: 5 % (ref 4–12)
NEUTROPHILS # BLD AUTO: 8.66 THOUSANDS/ΜL (ref 1.85–7.62)
NEUTS SEG NFR BLD AUTO: 87 % (ref 43–75)
NITRITE UR QL STRIP: NEGATIVE
NON-SQ EPI CELLS URNS QL MICRO: NORMAL /HPF
NRBC BLD AUTO-RTO: 0 /100 WBCS
OB PNL GAST: NEGATIVE
OPIATES UR QL SCN: NEGATIVE
OSMOLALITY UR/SERPL-RTO: 237 MMOL/KG (ref 282–298)
OSMOLALITY UR: 535 MMOL/KG
P AXIS: 70 DEGREES
P AXIS: 81 DEGREES
PCO2 BLD: 21 MMOL/L (ref 21–32)
PCO2 BLD: 22 MMOL/L (ref 21–32)
PCO2 BLD: 24 MMOL/L (ref 21–32)
PCO2 BLD: 25.8 MM HG (ref 42–50)
PCO2 BLD: 33.1 MM HG (ref 42–50)
PCP UR QL: NEGATIVE
PH BLD: 7.38 [PH] (ref 7.3–7.4)
PH BLD: 7.57 [PH] (ref 7.3–7.4)
PH UR STRIP.AUTO: 5.5 [PH] (ref 4.5–8)
PLATELET # BLD AUTO: 355 THOUSANDS/UL (ref 149–390)
PMV BLD AUTO: 8.7 FL (ref 8.9–12.7)
PO2 BLD: 34 MM HG (ref 35–45)
PO2 BLD: 41 MM HG (ref 35–45)
POTASSIUM BLD-SCNC: 3.5 MMOL/L (ref 3.5–5.3)
POTASSIUM BLD-SCNC: 3.6 MMOL/L (ref 3.5–5.3)
POTASSIUM BLD-SCNC: 4 MMOL/L (ref 3.5–5.3)
POTASSIUM SERPL-SCNC: 2.7 MMOL/L (ref 3.5–5.3)
POTASSIUM SERPL-SCNC: 3.2 MMOL/L (ref 3.5–5.3)
POTASSIUM SERPL-SCNC: 3.7 MMOL/L (ref 3.5–5.3)
POTASSIUM SERPL-SCNC: 3.8 MMOL/L (ref 3.5–5.3)
PR INTERVAL: 126 MS
PR INTERVAL: 133 MS
PROT SERPL-MCNC: 7.4 G/DL (ref 6.4–8.2)
PROT UR STRIP-MCNC: ABNORMAL MG/DL
PROTHROMBIN TIME: 13.3 SECONDS (ref 11.8–14.2)
QRS AXIS: 36 DEGREES
QRS AXIS: 64 DEGREES
QRS AXIS: 67 DEGREES
QRSD INTERVAL: 66 MS
QRSD INTERVAL: 66 MS
QRSD INTERVAL: 71 MS
QT INTERVAL: 320 MS
QT INTERVAL: 342 MS
QT INTERVAL: 350 MS
QTC INTERVAL: 441 MS
QTC INTERVAL: 457 MS
QTC INTERVAL: 488 MS
RBC # BLD AUTO: 4.54 MILLION/UL (ref 3.81–5.12)
RBC #/AREA URNS AUTO: NORMAL /HPF
SALICYLATES SERPL-MCNC: <3 MG/DL (ref 3–20)
SAO2 % BLD FROM PO2: 76 % (ref 95–98)
SAO2 % BLD FROM PO2: 77 % (ref 95–98)
SODIUM 24H UR-SCNC: 43 MOL/L
SODIUM BLD-SCNC: 111 MMOL/L (ref 136–145)
SODIUM BLD-SCNC: 112 MMOL/L (ref 136–145)
SODIUM BLD-SCNC: 115 MMOL/L (ref 136–145)
SODIUM SERPL-SCNC: 112 MMOL/L (ref 136–145)
SODIUM SERPL-SCNC: 115 MMOL/L (ref 136–145)
SODIUM SERPL-SCNC: 117 MMOL/L (ref 136–145)
SODIUM SERPL-SCNC: 120 MMOL/L (ref 136–145)
SP GR UR STRIP.AUTO: 1.02 (ref 1–1.03)
SPECIMEN SOURCE: ABNORMAL
T WAVE AXIS: 70 DEGREES
T WAVE AXIS: 72 DEGREES
T WAVE AXIS: 76 DEGREES
THC UR QL: NEGATIVE
TROPONIN I SERPL-MCNC: <0.02 NG/ML
TSH SERPL DL<=0.05 MIU/L-ACNC: 3.28 UIU/ML (ref 0.36–3.74)
UROBILINOGEN UR QL STRIP.AUTO: 0.2 E.U./DL
VENTRICULAR RATE: 107 BPM
VENTRICULAR RATE: 114 BPM
VENTRICULAR RATE: 117 BPM
WBC # BLD AUTO: 9.96 THOUSAND/UL (ref 4.31–10.16)
WBC #/AREA URNS AUTO: NORMAL /HPF

## 2018-08-13 PROCEDURE — 82550 ASSAY OF CK (CPK): CPT | Performed by: EMERGENCY MEDICINE

## 2018-08-13 PROCEDURE — 99285 EMERGENCY DEPT VISIT HI MDM: CPT

## 2018-08-13 PROCEDURE — 85014 HEMATOCRIT: CPT

## 2018-08-13 PROCEDURE — 84484 ASSAY OF TROPONIN QUANT: CPT | Performed by: EMERGENCY MEDICINE

## 2018-08-13 PROCEDURE — 84132 ASSAY OF SERUM POTASSIUM: CPT

## 2018-08-13 PROCEDURE — 81001 URINALYSIS AUTO W/SCOPE: CPT

## 2018-08-13 PROCEDURE — 83605 ASSAY OF LACTIC ACID: CPT | Performed by: EMERGENCY MEDICINE

## 2018-08-13 PROCEDURE — 85018 HEMOGLOBIN: CPT | Performed by: PHYSICIAN ASSISTANT

## 2018-08-13 PROCEDURE — 96361 HYDRATE IV INFUSION ADD-ON: CPT

## 2018-08-13 PROCEDURE — 84443 ASSAY THYROID STIM HORMONE: CPT | Performed by: EMERGENCY MEDICINE

## 2018-08-13 PROCEDURE — 93010 ELECTROCARDIOGRAM REPORT: CPT | Performed by: INTERNAL MEDICINE

## 2018-08-13 PROCEDURE — 5A1945Z RESPIRATORY VENTILATION, 24-96 CONSECUTIVE HOURS: ICD-10-PCS | Performed by: EMERGENCY MEDICINE

## 2018-08-13 PROCEDURE — 80048 BASIC METABOLIC PNL TOTAL CA: CPT | Performed by: EMERGENCY MEDICINE

## 2018-08-13 PROCEDURE — 82948 REAGENT STRIP/BLOOD GLUCOSE: CPT

## 2018-08-13 PROCEDURE — 84300 ASSAY OF URINE SODIUM: CPT | Performed by: EMERGENCY MEDICINE

## 2018-08-13 PROCEDURE — C9113 INJ PANTOPRAZOLE SODIUM, VIA: HCPCS | Performed by: PHYSICIAN ASSISTANT

## 2018-08-13 PROCEDURE — 84295 ASSAY OF SERUM SODIUM: CPT

## 2018-08-13 PROCEDURE — 71045 X-RAY EXAM CHEST 1 VIEW: CPT

## 2018-08-13 PROCEDURE — 85730 THROMBOPLASTIN TIME PARTIAL: CPT | Performed by: EMERGENCY MEDICINE

## 2018-08-13 PROCEDURE — 82330 ASSAY OF CALCIUM: CPT

## 2018-08-13 PROCEDURE — 87147 CULTURE TYPE IMMUNOLOGIC: CPT | Performed by: EMERGENCY MEDICINE

## 2018-08-13 PROCEDURE — 96374 THER/PROPH/DIAG INJ IV PUSH: CPT

## 2018-08-13 PROCEDURE — 80048 BASIC METABOLIC PNL TOTAL CA: CPT | Performed by: INTERNAL MEDICINE

## 2018-08-13 PROCEDURE — 82803 BLOOD GASES ANY COMBINATION: CPT

## 2018-08-13 PROCEDURE — 82553 CREATINE MB FRACTION: CPT | Performed by: EMERGENCY MEDICINE

## 2018-08-13 PROCEDURE — 96376 TX/PRO/DX INJ SAME DRUG ADON: CPT

## 2018-08-13 PROCEDURE — 80307 DRUG TEST PRSMV CHEM ANLYZR: CPT | Performed by: EMERGENCY MEDICINE

## 2018-08-13 PROCEDURE — 99291 CRITICAL CARE FIRST HOUR: CPT | Performed by: PHYSICIAN ASSISTANT

## 2018-08-13 PROCEDURE — 93005 ELECTROCARDIOGRAM TRACING: CPT

## 2018-08-13 PROCEDURE — 85610 PROTHROMBIN TIME: CPT | Performed by: EMERGENCY MEDICINE

## 2018-08-13 PROCEDURE — 96375 TX/PRO/DX INJ NEW DRUG ADDON: CPT

## 2018-08-13 PROCEDURE — 80329 ANALGESICS NON-OPIOID 1 OR 2: CPT | Performed by: EMERGENCY MEDICINE

## 2018-08-13 PROCEDURE — 83930 ASSAY OF BLOOD OSMOLALITY: CPT | Performed by: EMERGENCY MEDICINE

## 2018-08-13 PROCEDURE — 82947 ASSAY GLUCOSE BLOOD QUANT: CPT

## 2018-08-13 PROCEDURE — 80053 COMPREHEN METABOLIC PANEL: CPT | Performed by: EMERGENCY MEDICINE

## 2018-08-13 PROCEDURE — 80320 DRUG SCREEN QUANTALCOHOLS: CPT | Performed by: EMERGENCY MEDICINE

## 2018-08-13 PROCEDURE — 87040 BLOOD CULTURE FOR BACTERIA: CPT | Performed by: EMERGENCY MEDICINE

## 2018-08-13 PROCEDURE — 0BH17EZ INSERTION OF ENDOTRACHEAL AIRWAY INTO TRACHEA, VIA NATURAL OR ARTIFICIAL OPENING: ICD-10-PCS | Performed by: EMERGENCY MEDICINE

## 2018-08-13 PROCEDURE — 70450 CT HEAD/BRAIN W/O DYE: CPT

## 2018-08-13 PROCEDURE — 94760 N-INVAS EAR/PLS OXIMETRY 1: CPT

## 2018-08-13 PROCEDURE — 95951 HB EEG MONITORING/VIDEORECORD: CPT

## 2018-08-13 PROCEDURE — 83935 ASSAY OF URINE OSMOLALITY: CPT | Performed by: EMERGENCY MEDICINE

## 2018-08-13 PROCEDURE — 85025 COMPLETE CBC W/AUTO DIFF WBC: CPT | Performed by: EMERGENCY MEDICINE

## 2018-08-13 PROCEDURE — 82271 OCCULT BLOOD OTHER SOURCES: CPT | Performed by: PHYSICIAN ASSISTANT

## 2018-08-13 PROCEDURE — 80047 BASIC METABLC PNL IONIZED CA: CPT

## 2018-08-13 PROCEDURE — 83605 ASSAY OF LACTIC ACID: CPT | Performed by: INTERNAL MEDICINE

## 2018-08-13 PROCEDURE — 36415 COLL VENOUS BLD VENIPUNCTURE: CPT | Performed by: EMERGENCY MEDICINE

## 2018-08-13 PROCEDURE — 95951 PR EEG MONITORING/VIDEORECORD: CPT | Performed by: PSYCHIATRY & NEUROLOGY

## 2018-08-13 PROCEDURE — 94002 VENT MGMT INPAT INIT DAY: CPT

## 2018-08-13 PROCEDURE — 82550 ASSAY OF CK (CPK): CPT | Performed by: PHYSICIAN ASSISTANT

## 2018-08-13 PROCEDURE — 82553 CREATINE MB FRACTION: CPT | Performed by: PHYSICIAN ASSISTANT

## 2018-08-13 PROCEDURE — 99291 CRITICAL CARE FIRST HOUR: CPT | Performed by: EMERGENCY MEDICINE

## 2018-08-13 RX ORDER — POTASSIUM CHLORIDE 20MEQ/15ML
40 LIQUID (ML) ORAL ONCE
Status: COMPLETED | OUTPATIENT
Start: 2018-08-13 | End: 2018-08-13

## 2018-08-13 RX ORDER — ACETAMINOPHEN 160 MG/5ML
975 SUSPENSION, ORAL (FINAL DOSE FORM) ORAL EVERY 8 HOURS
Status: DISCONTINUED | OUTPATIENT
Start: 2018-08-13 | End: 2018-08-14

## 2018-08-13 RX ORDER — LORAZEPAM 2 MG/ML
INJECTION INTRAMUSCULAR
Status: COMPLETED
Start: 2018-08-13 | End: 2018-08-13

## 2018-08-13 RX ORDER — POTASSIUM CHLORIDE 20 MEQ/1
40 TABLET, EXTENDED RELEASE ORAL ONCE
Status: DISCONTINUED | OUTPATIENT
Start: 2018-08-13 | End: 2018-08-13

## 2018-08-13 RX ORDER — SODIUM CHLORIDE 9 MG/ML
50 INJECTION, SOLUTION INTRAVENOUS CONTINUOUS
Status: DISCONTINUED | OUTPATIENT
Start: 2018-08-13 | End: 2018-08-14

## 2018-08-13 RX ORDER — METOPROLOL TARTRATE 5 MG/5ML
5 INJECTION INTRAVENOUS EVERY 6 HOURS PRN
Status: DISCONTINUED | OUTPATIENT
Start: 2018-08-13 | End: 2018-08-16

## 2018-08-13 RX ORDER — SUCCINYLCHOLINE CHLORIDE 20 MG/ML
100 INJECTION INTRAMUSCULAR; INTRAVENOUS ONCE
Status: COMPLETED | OUTPATIENT
Start: 2018-08-13 | End: 2018-08-13

## 2018-08-13 RX ORDER — PROPOFOL 10 MG/ML
INJECTION, EMULSION INTRAVENOUS
Status: COMPLETED
Start: 2018-08-13 | End: 2018-08-13

## 2018-08-13 RX ORDER — DIAZEPAM 5 MG/ML
20 INJECTION, SOLUTION INTRAMUSCULAR; INTRAVENOUS EVERY 4 HOURS PRN
Status: DISCONTINUED | OUTPATIENT
Start: 2018-08-13 | End: 2018-08-15

## 2018-08-13 RX ORDER — PROPOFOL 10 MG/ML
5-50 INJECTION, EMULSION INTRAVENOUS
Status: DISCONTINUED | OUTPATIENT
Start: 2018-08-13 | End: 2018-08-16

## 2018-08-13 RX ORDER — LORAZEPAM 2 MG/ML
2 INJECTION INTRAMUSCULAR ONCE
Status: COMPLETED | OUTPATIENT
Start: 2018-08-13 | End: 2018-08-13

## 2018-08-13 RX ORDER — PANTOPRAZOLE SODIUM 40 MG/1
40 INJECTION, POWDER, FOR SOLUTION INTRAVENOUS EVERY 12 HOURS SCHEDULED
Status: COMPLETED | OUTPATIENT
Start: 2018-08-13 | End: 2018-08-13

## 2018-08-13 RX ORDER — SODIUM CHLORIDE 3 G/100ML
150 INJECTION, SOLUTION INTRAVENOUS ONCE
Status: COMPLETED | OUTPATIENT
Start: 2018-08-13 | End: 2018-08-13

## 2018-08-13 RX ORDER — FUROSEMIDE 10 MG/ML
40 INJECTION INTRAMUSCULAR; INTRAVENOUS ONCE
Status: DISCONTINUED | OUTPATIENT
Start: 2018-08-13 | End: 2018-08-13

## 2018-08-13 RX ORDER — PROPOFOL 10 MG/ML
5-50 INJECTION, EMULSION INTRAVENOUS
Status: DISCONTINUED | OUTPATIENT
Start: 2018-08-13 | End: 2018-08-13

## 2018-08-13 RX ORDER — DIAZEPAM 5 MG/ML
20 INJECTION, SOLUTION INTRAMUSCULAR; INTRAVENOUS ONCE
Status: COMPLETED | OUTPATIENT
Start: 2018-08-13 | End: 2018-08-13

## 2018-08-13 RX ORDER — ACETAMINOPHEN 160 MG/5ML
650 SUSPENSION, ORAL (FINAL DOSE FORM) ORAL EVERY 4 HOURS PRN
Status: DISCONTINUED | OUTPATIENT
Start: 2018-08-13 | End: 2018-08-13

## 2018-08-13 RX ORDER — PANTOPRAZOLE SODIUM 40 MG/1
40 INJECTION, POWDER, FOR SOLUTION INTRAVENOUS DAILY
Status: COMPLETED | OUTPATIENT
Start: 2018-08-13 | End: 2018-08-13

## 2018-08-13 RX ORDER — ETOMIDATE 2 MG/ML
20 INJECTION INTRAVENOUS ONCE
Status: COMPLETED | OUTPATIENT
Start: 2018-08-13 | End: 2018-08-13

## 2018-08-13 RX ORDER — CHLORHEXIDINE GLUCONATE 0.12 MG/ML
15 RINSE ORAL EVERY 12 HOURS SCHEDULED
Status: DISCONTINUED | OUTPATIENT
Start: 2018-08-13 | End: 2018-08-16

## 2018-08-13 RX ORDER — FUROSEMIDE 10 MG/ML
20 INJECTION INTRAMUSCULAR; INTRAVENOUS ONCE
Status: COMPLETED | OUTPATIENT
Start: 2018-08-13 | End: 2018-08-13

## 2018-08-13 RX ADMIN — SODIUM CHLORIDE 8 MG/HR: 9 INJECTION, SOLUTION INTRAVENOUS at 11:07

## 2018-08-13 RX ADMIN — Medication 20 MG: at 08:43

## 2018-08-13 RX ADMIN — POTASSIUM CHLORIDE 40 MEQ: 20 SOLUTION ORAL at 20:30

## 2018-08-13 RX ADMIN — SODIUM CHLORIDE 150 ML: 3 INJECTION, SOLUTION INTRAVENOUS at 07:45

## 2018-08-13 RX ADMIN — POTASSIUM CHLORIDE 40 MEQ: 20 SOLUTION ORAL at 13:48

## 2018-08-13 RX ADMIN — SODIUM CHLORIDE 100 ML/HR: 0.9 INJECTION, SOLUTION INTRAVENOUS at 11:02

## 2018-08-13 RX ADMIN — Medication 20 MG: at 20:30

## 2018-08-13 RX ADMIN — Medication 100 MG: at 09:09

## 2018-08-13 RX ADMIN — LORAZEPAM 2 MG: 2 INJECTION INTRAMUSCULAR; INTRAVENOUS at 08:25

## 2018-08-13 RX ADMIN — LORAZEPAM 2 MG: 2 INJECTION INTRAMUSCULAR; INTRAVENOUS at 07:40

## 2018-08-13 RX ADMIN — Medication 20 MG: at 11:47

## 2018-08-13 RX ADMIN — LORAZEPAM 2 MG: 2 INJECTION INTRAMUSCULAR; INTRAVENOUS at 12:57

## 2018-08-13 RX ADMIN — SODIUM CHLORIDE 1000 ML: 0.9 INJECTION, SOLUTION INTRAVENOUS at 07:04

## 2018-08-13 RX ADMIN — PROPOFOL 40 MCG/KG/MIN: 10 INJECTION, EMULSION INTRAVENOUS at 20:59

## 2018-08-13 RX ADMIN — PANTOPRAZOLE SODIUM 40 MG: 40 INJECTION, POWDER, FOR SOLUTION INTRAVENOUS at 20:40

## 2018-08-13 RX ADMIN — POTASSIUM CHLORIDE 20 MEQ: 2 INJECTION, SOLUTION, CONCENTRATE INTRAVENOUS at 14:09

## 2018-08-13 RX ADMIN — ACETAMINOPHEN 650 MG: 160 SUSPENSION ORAL at 15:54

## 2018-08-13 RX ADMIN — ETOMIDATE 20 MG: 2 INJECTION INTRAVENOUS at 09:09

## 2018-08-13 RX ADMIN — POTASSIUM CHLORIDE 20 MEQ: 2 INJECTION, SOLUTION, CONCENTRATE INTRAVENOUS at 21:02

## 2018-08-13 RX ADMIN — PROPOFOL 50 MCG/KG/MIN: 10 INJECTION, EMULSION INTRAVENOUS at 11:02

## 2018-08-13 RX ADMIN — CHLORHEXIDINE GLUCONATE 15 ML: 1.2 RINSE ORAL at 13:48

## 2018-08-13 RX ADMIN — ENOXAPARIN SODIUM 40 MG: 40 INJECTION SUBCUTANEOUS at 11:07

## 2018-08-13 RX ADMIN — PROPOFOL 50 MCG/KG/MIN: 10 INJECTION, EMULSION INTRAVENOUS at 15:46

## 2018-08-13 RX ADMIN — PANTOPRAZOLE SODIUM 40 MG: 40 INJECTION, POWDER, FOR SOLUTION INTRAVENOUS at 11:07

## 2018-08-13 RX ADMIN — PROPOFOL 50 MCG/KG/MIN: 10 INJECTION, EMULSION INTRAVENOUS at 12:16

## 2018-08-13 RX ADMIN — Medication 20 MG: at 08:56

## 2018-08-13 RX ADMIN — SODIUM CHLORIDE 500 ML: 0.9 INJECTION, SOLUTION INTRAVENOUS at 07:31

## 2018-08-13 RX ADMIN — SODIUM CHLORIDE 500 ML: 0.9 INJECTION, SOLUTION INTRAVENOUS at 08:25

## 2018-08-13 RX ADMIN — FUROSEMIDE 20 MG: 10 INJECTION, SOLUTION INTRAMUSCULAR; INTRAVENOUS at 11:07

## 2018-08-13 RX ADMIN — LORAZEPAM 2 MG: 2 INJECTION INTRAMUSCULAR at 07:40

## 2018-08-13 RX ADMIN — Medication 20 MG: at 22:25

## 2018-08-13 RX ADMIN — PROPOFOL 20 MCG/KG/MIN: 10 INJECTION, EMULSION INTRAVENOUS at 09:15

## 2018-08-13 RX ADMIN — POTASSIUM CHLORIDE 20 MEQ: 2 INJECTION, SOLUTION, CONCENTRATE INTRAVENOUS at 15:34

## 2018-08-13 RX ADMIN — CHLORHEXIDINE GLUCONATE 15 ML: 1.2 RINSE ORAL at 20:40

## 2018-08-13 NOTE — RESPIRATORY THERAPY NOTE
RT Ventilator Management Note  Raquel Gamez 62 y o  female MRN: [id number]  Unit/Bed#: [facility] 01 Encounter: [id number]      Daily Screen     No data found  Physical Exam:   Assessment Type: (P) Assess only  General Appearance: (P) Sedated  Respiratory Pattern: (P) Normal  Chest Assessment: (P) Chest expansion symmetrical  Bilateral Breath Sounds: (P) Rhonchi      Resp Comments: (P) Pt admitted to MICU from ED  Apparent drug OD  Pt  resting on [address] settings

## 2018-08-13 NOTE — H&P
History and Physical - Critical Care    Luster Goltz 62 y o  female MRN: [id number]  [address] [facility]   Unit/Bed#: MICU 01 Encounter: [id number]      Reason for Admission / Principal Problem: Status epilepticus ([address] )    HPI: Luster Goltz is a 62 y o  female who presents to [facility] ED from home s/p EMS summon by mother for changes in mental status  The patient discussed with her mother this morning that she had taken extra doses of her Geodon  EMS arrived at the house and according to the history by a emergency department staff and corroborated by Toxicology Service, the only persons available at the time to get a history from, the patient had taken 3 extra geodon"  The family present at the time of emergency department evaluations stated that the patient also is on Seroquel and Paxil  There is suspicion that the patient may have taken additional doses of all of her medications, as multiple recent history transcriptions have shown that the patient attempted to contact PCP as an outpatient for treatment of back pain  This is the reason for suspicion that the patient may have been taking increases of all of her medications  At the home EMS found the patient significantly altered in appearance, non cohesive, had diaphoretic and tachycardic  In the emergency department the patient had a temperature of 100 8°, was tachycardic, additionally the patient had a witnessed seizure in the emergency department  She was sedated and intubated and placed on propofol  She still was having active seizure activity in the emergency department requiring increased doses of propofol and treatment with 2 doses of 20 mg of diazepam     In the intensive care unit the patient was sedated and ventilated on 50 micrograms per kg per/minute of propofol  Epileptologist E evaluation was consulted for    Patient was admitted hyponatremic with a serum sodium of 112 that improved to 115 post a 150 mL bolus of 3% saline, an elevated CK of 5000, and a lactic acid of 8 1  Also, on my examination in the emergency department the patient had large amounts of coffee ground material in her NG tube which was to suction  History obtained from chart review and unobtainable from patient due to mental status  PMH:   Past Medical History:   Diagnosis Date    Anxiety     Back pain at L4-L5 level     Schreiber esophagus     Bulimia     Depression     Disease of thyroid gland     hypothyroid    GERD (gastroesophageal reflux disease)     Hyperlipidemia     Hypothyroidism     Leukopenia     Synovial cyst of lumbar spine     Vertigo     Weight gain        PSH:   Past Surgical History:   Procedure Laterality Date    BONE MARROW BIOPSY      BREAST SURGERY      enlargement     RHINOPLASTY         Family History:   Family History   Problem Relation Age of Onset    Uterine cancer Mother     Esophageal cancer Father     Colon cancer Maternal Grandmother        Social History:   History   Smoking Status    Never Smoker   Smokeless Tobacco    Never Used      History   Alcohol Use No      Marital Status: Single    ROS: 14 point ROS is unable to be obatined seconday to patient status  Allergies: Allergies   Allergen Reactions    Latex     Risperdal [Risperidone]     Zyprexa [Olanzapine]        Home Medications:   Prior to Admission medications    Medication Sig Start Date End Date Taking?  Authorizing Provider   alendronate (FOSAMAX) 70 mg tablet Take 1 tablet (70 mg total) by mouth every 7 days for 90 days 6/19/18 9/17/18  Arian Rucker,    bisacodyl (DULCOLAX) 5 mg EC tablet Take by mouth 4/10/14   Historical Provider, MD   calcium carbonate-vitamin D (OSCAL-D) 500 mg-200 units per tablet Take 1 tablet by mouth 2 (two) times a day with meals for 30 days 6/19/18 7/19/18  Arian Rucker DO   diclofenac potassium (CATAFLAM) 50 mg tablet TAKE 1 TABLET BY MOUTH TWICE A DAY AS NEEDED 7/2/18   Marglen Laughter CORY Portillo   docusate sodium (COLACE) 100 mg capsule Take 1 capsule by mouth 2 (two) times a day 6/8/17   Historical Provider, MD   ferrous sulfate 325 (65 Fe) mg tablet Take 1 tablet (325 mg total) by mouth 2 (two) times a day for 90 days 6/25/18 9/23/18  Gina Hall, DO   fluticasone [facility]) 50 mcg/act nasal spray 1 spray into each nostril daily for 14 days 5/25/18 6/8/18  Gina Hall, DO   levothyroxine 25 mcg tablet Take 25 mcg by mouth daily  Historical Provider, MD   levothyroxine 25 mcg tablet Take 1 tablet by mouth daily 5/2/17   Historical Provider, MD   levothyroxine 25 mcg tablet TAKE 1 TABLET EVERY DAY 7/16/18   Sri Rodriguez,    LORazepam (ATIVAN) 2 mg tablet Take 2 mg by mouth every 6 (six) hours as needed for anxiety  Historical Provider, MD   LORazepam (ATIVAN) 2 mg tablet Take 1 tablet by mouth 4 (four) times a day    Historical Provider, MD   Methylprednisolone 4 MG TBPK 24mg PO on day 1, then decrease by 4mg/day x 5 days per dose pack instructions  3/26/18   Enriqueta Rooney PA-C   omeprazole (PriLOSEC) 40 MG capsule Take 40 mg by mouth 2 (two) times a day  Historical Provider, MD   omeprazole (PriLOSEC) 40 MG capsule Take 80 mg by mouth daily at bedtime  Historical Provider, MD   omeprazole (PriLOSEC) 40 MG capsule Take 1 capsule by mouth 2 (two) times a day 2/24/11   Historical Provider, MD   omeprazole (PriLOSEC) 40 MG capsule TAKE 1 CAPSULE TWICE DAILY  6/20/18   Sri Rodriguez DO   PARoxetine (PAXIL) 30 mg tablet Take 30 mg by mouth every morning  Historical Provider, MD   PARoxetine (PAXIL) 30 mg tablet Take 1 tablet by mouth 3 (three) times a day    Historical Provider, MD   QUEtiapine (SEROquel) 200 mg tablet Take 200 mg by mouth 2 (two) times a day        Historical Provider, MD   QUEtiapine (SEROQUEL) 200 mg tablet Take 2 tablets by mouth daily at bedtime 4/22/11   Historical Provider, MD   QUEtiapine (SEROquel) 400 MG tablet Take 400 mg by mouth daily at bedtime 3/5/18 Historical Provider, MD   triamcinolone (KENALOG) 0 1 % cream  18   Historical Provider, MD   ziprasidone (GEODON) 80 mg capsule Take 80 mg by mouth daily      Historical Provider, MD   ziprasidone (GEODON) 80 mg capsule Take 2 capsules by mouth daily at bedtime   11   Historical Provider, MD       Vitals:   Chuy Meiers:    18 0845 18 0915 18 0920 18 0956   BP: (!) 156/106 (!) 170/113     BP Location: Right arm Right arm     Pulse: (!) 140 (!) 110 (!) (P) 107    Resp: (!) 45 22 (P) 22    Temp: 100 4 °F (38 °C) 99 3 °F (37 4 °C)     TempSrc:  Tympanic Core     SpO2: 98% 99% 99%    Weight:    70 6 kg (155 lb 10 3 oz)   Height:                 Respiratory:  SpO2: SpO2: 100 %       Temperature: Temp (24hrs), Av 5 °F (37 5 °C), Min:97 9 °F (36 6 °C), Max:100 4 °F (38 °C)  Current: Temperature: 99 3 °F (37 4 °C)    Weights: IBW: 52 4 kg  Body mass index is 27 57 kg/m²  Physical Exam:    General Appearance:  Appears distress, moderate tonic activity both arms  HENT:  Normocephalic atraumatic, oropharynx with endotracheal tube present  Neck:  Supple, no tracheal deviation,  No CVC  Eyes:  Pupillary dilatation with reactive pupils bilaterally 4 mm, ocular static mass, no icterus  Cardiac:  Tachycardic rate and normal sinus rhythm, no murmur, no rub  Pulmonary:  Clear to auscultation bilaterally, no secretions  Gastrointestinal:  Obese, soft,, no distention,  nontender, positive bowel sounds  : Bernardo present: yes , draining clear yellow urine            Musculoskeletal: Trace edema bilaterally  Neuro:  GCS 4, 1, 3 with tonic posture activity raising both hands toward endotracheal tube lower extremities in the flexed position  Psych: Mood and affect non appreciable  Skin:  Intact      Labs:     Results from last 7 days  Lab Units 18  0842 18  0718 18  0708   WBC Thousand/uL  --  9 96  --    HEMOGLOBIN g/dL  --  13 5  --    I STAT HEMOGLOBIN g/dl 13 3  --  13 6  14 3   HEMATOCRIT %  --  36 5  --    PLATELETS Thousands/uL  --  355  --    NEUTROS PCT %  --  87*  --    MONOS PCT %  --  5  --        Results from last 7 days  Lab Units 18  0842 18  0830 18  0708 18  0703   SODIUM mmol/L  --  115*  --  112*   POTASSIUM mmol/L  --  3 8  --  3 7   CHLORIDE mmol/L  --  80*  --  75*   CO2 mmol/L  --  18*  --  21   BUN mg/dL  --  15  --  14   CREATININE mg/dL  --  1 00  --  0 85   CALCIUM mg/dL  --  7 5*  --  8 7   TOTAL PROTEIN g/dL  --   --   --  7 4   BILIRUBIN TOTAL mg/dL  --   --   --  1 09*   ALK PHOS U/L  --   --   --  75   ALT U/L  --   --   --  34   AST U/L  --   --   --  120*   GLUCOSE RANDOM mg/dL  --  122  --  109   GLUCOSE, ISTAT mg/dl 125  --  117  113  --            Results from last 7 days  Lab Units 18  0718   INR  1 00   PTT seconds 27       Results from last 7 days  Lab Units 18  0829 18  0703   LACTIC ACID mmol/L 8 1* 3 9*       Results from last 7 days  Lab Units 18  0703   TROPONIN I ng/mL <0 02           Imagin18 CXR:  No acute disease I have personally reviewed pertinent reports  and I have personally reviewed pertinent films in PACS  18 CT:  Head, no acute disease I have personally reviewed pertinent reports     and I have personally reviewed pertinent films in PACS    Micro:  Blood Culture: No results found for: BLOODCX  Urine Culture:   Lab Results   Component Value Date    URINECX 70,000-79,000 cfu/ml Mixed Contaminants X5 2016     Sputum Culture: No components found for: SPUTUMCX  Wound Culure: No results found for: WOUNDCULT    Impression:  Principal Problem:    Status epilepticus ([address] )  Active Problems:    Hyponatremia    Serotonin syndrome    Delirium    Acute respiratory failure (HCC)    Metabolic acidosis, increased anion gap    Hyperthermia    Tachycardia    Non-traumatic rhabdomyolysis      Assessment / Plan :  Neuro:  Status epilepticus:  Most likely secondary to serotonin syndrome, also treating hyponatremia  I increased this patient's propofol dosing in response to seizure activity for gain of seizure cessation, consult epileptologist for continuous EEG monitoring  -CAM ICU non determinable:  Delirium Precautions  -Pain /Sedation:  Propofol / Sedation day 1  / SAT today   -Neuro Checks:  Critical, q 1 hour  CV/Heme/VTE PPx:   HTN:  Likely seizure related:  Control seizures with propofol, add Valium if necessary, can treat with Cardene if necessary once sufficient control of seizure activity  -Hemoglobin :  Stable /  Platelets:  Stable  / Coagulation:  Stable  - Chemical VTE PPX :  Lovenox  / SCDs  Pulm: Intubated for airway protection for depressed level of consciousness:  Continue mechanical ventilation for need for high-dose sedatives and seizure control  -I/S v  Flutter Valve / Pulm Toilet  - ETT day 1  -VAP PPx:  CHG/Pulm toilet/HOB >30/secretion management  :  in line suction  -SBT today for vent liberation assessment  GI/Endo:    Hypothyroidism:  Continue normal home dose of Synthroid  Acute GI bleed:  Likely upper in source, initiating Protonix bolus and infusion, will consult Gastroenterology if necessary if bleeding continues  Probable secondary to Eloisa-Navas tear secondary to seizure activity  -Glycemic Control:  Accuchecks :  SSI Alg  If  needed   -Bowel Reg:  Senna  / PRN Laxatives if required  -Stress Ulcer PPx :  Protonix bolus and infusion  -Nutrition:  NPO   /FEN:  Hypo osmolar hyponatremia with normovolemia:  most likely consistent with SIADH and likely chronic in nature, though can't exclude acute    Treating with diuretics and saline  -IVF :  Net Bal:  Indeterminate / Goal :  -1 to 1 5 L  -Electrolytes:  goal K/M/PH :  4, 2, 3 :  Replete as necessary   -Bernardo:  Yes /  day 1  ID:  -Trend Fever and WBC curve  -Current role for abx:   None current /  Abx Day 0  /  -Cultures:  None  MSK/Mobility/ PT OT/ Lines:   Rhabdomyolysis: nontraumatic in nature, likely due to seizure activity  -MSK:  Routine Turning, Offloading, Skin care  -OOB / Ambulatory  as early as possible  -PTOT / CM and Rehab Assessment   -Access:  PIV:  Yes /  Central Access:  No /  day 0  /  arterial catheter:  Day 0  Consultants:  Toxicology, epileptologist  Dispo/Family:   ICU care / Patient and Family Updated:  Patient family not available at this time for updating  Will contact via phone    Invasive lines and devices: Invasive Devices     Peripheral Intravenous Line            Peripheral IV 08/13/18 Left Antecubital less than 1 day    Peripheral IV 08/13/18 Left Hand less than 1 day    Peripheral IV 08/13/18 Right Forearm less than 1 day          Drain            NG/OG/Enteral Tube Orogastric 18 Fr Right mouth less than 1 day    Urethral Catheter Temperature probe 16 Fr  less than 1 day                Code Status: Level 1 - Full Code    Counseling / Coordination of Care  Total Critical Care time spent 51 minutes excluding procedures, teaching and family updates          SIGNATURE: Todd Baldwin PA-C  DATE: August 13, 2018  TIME: 10:50 AM

## 2018-08-13 NOTE — ED PROVIDER NOTES
History  Chief Complaint   Patient presents with    Altered Mental Status     per EMS, patients mother states that she has been increasing confused over past day  Mother also told EMS pt took 240 mg geodon PO     61 yo F presents to ED with AMS and reports of taking three 80 mg Geodon last night  Pt lives with mother and told mom that she had taken three times the amount of her normal Geodon dose, unknown if other co-ingestions/medication overdose at that time  Pt on arrival noted to be delirious (unable to provide any history/information related to present care)  Pt is tachycardic with HR in 110-120's, tachypneic with RR in the 30's, diaphoretic with mydriasis, stiffened extremities with (+) clonus  Per EMS, pt initially had trash bags on her feet and when they removed them she had caked feces to bilateral feet  Pt has PMH of depression (on Paxil, Seroquel, Geodon), HLD, hypothyroidism, anxiety, GERD, Schreiber's esophagus and back pain  PSH of Rhinoplasty; Bone marrow biopsy; and Breast surgery  Pt has hx in medical records of ETOH abuse and related hyponatremia in 2016  Unsure if new medications, medication changes or intention of increased medication ingestion  MDM: AMS likely serotonin toxidrome will also evaluate for other toxic ingestion, infection, traumatic brain injury, prolonged downtime with associated rhabdomyolysis, cardiac event  History provided by:  Medical records and EMS personnel  History limited by:  Acuity of condition  Altered Mental Status   Presenting symptoms: confusion and disorientation    Presenting symptoms: no combativeness, no partial responsiveness and no unresponsiveness    Presenting symptoms comment:  Delirium  Severity:  Severe  Most recent episode:   Today  Episode history:  Unable to specify  Timing:  Unable to specify  Progression:  Unable to specify  Chronicity:  New  Context: drug use    Context: not nursing home resident        Prior to Admission Medications Prescriptions Last Dose Informant Patient Reported? Taking? LORazepam (ATIVAN) 2 mg tablet  Self Yes No   Sig: Take 2 mg by mouth every 6 (six) hours as needed for anxiety  LORazepam (ATIVAN) 2 mg tablet  Self Yes No   Sig: Take 1 tablet by mouth 4 (four) times a day   Methylprednisolone 4 MG TBPK  Self No No   Simg PO on day 1, then decrease by 4mg/day x 5 days per dose pack instructions  PARoxetine (PAXIL) 30 mg tablet  Self Yes No   Sig: Take 30 mg by mouth every morning  PARoxetine (PAXIL) 30 mg tablet  Self Yes No   Sig: Take 1 tablet by mouth 3 (three) times a day   QUEtiapine (SEROQUEL) 200 mg tablet  Self Yes No   Sig: Take 2 tablets by mouth daily at bedtime   QUEtiapine (SEROquel) 200 mg tablet  Self Yes No   Sig: Take 200 mg by mouth 2 (two) times a day  QUEtiapine (SEROquel) 400 MG tablet  Self Yes No   Sig: Take 400 mg by mouth daily at bedtime   alendronate (FOSAMAX) 70 mg tablet   No No   Sig: Take 1 tablet (70 mg total) by mouth every 7 days for 90 days   bisacodyl (DULCOLAX) 5 mg EC tablet  Self Yes No   Sig: Take by mouth   calcium carbonate-vitamin D (OSCAL-D) 500 mg-200 units per tablet   No No   Sig: Take 1 tablet by mouth 2 (two) times a day with meals for 30 days   diclofenac potassium (CATAFLAM) 50 mg tablet   No No   Sig: TAKE 1 TABLET BY MOUTH TWICE A DAY AS NEEDED   docusate sodium (COLACE) 100 mg capsule  Self Yes No   Sig: Take 1 capsule by mouth 2 (two) times a day   ferrous sulfate 325 (65 Fe) mg tablet   No No   Sig: Take 1 tablet (325 mg total) by mouth 2 (two) times a day for 90 days   fluticasone (FLONASE) 50 mcg/act nasal spray   No No   Si spray into each nostril daily for 14 days   levothyroxine 25 mcg tablet  Self Yes No   Sig: Take 25 mcg by mouth daily     levothyroxine 25 mcg tablet  Self Yes No   Sig: Take 1 tablet by mouth daily   levothyroxine 25 mcg tablet   No No   Sig: TAKE 1 TABLET EVERY DAY   omeprazole (PriLOSEC) 40 MG capsule  Self Yes No Sig: Take 40 mg by mouth 2 (two) times a day  omeprazole (PriLOSEC) 40 MG capsule  Self Yes No   Sig: Take 80 mg by mouth daily at bedtime  omeprazole (PriLOSEC) 40 MG capsule  Self Yes No   Sig: Take 1 capsule by mouth 2 (two) times a day   omeprazole (PriLOSEC) 40 MG capsule   No No   Sig: TAKE 1 CAPSULE TWICE DAILY  triamcinolone (KENALOG) 0 1 % cream  Self Yes No   ziprasidone (GEODON) 80 mg capsule  Self Yes No   Sig: Take 80 mg by mouth daily     ziprasidone (GEODON) 80 mg capsule  Self Yes No   Sig: Take 2 capsules by mouth daily at bedtime        Facility-Administered Medications: None       Past Medical History:   Diagnosis Date    Anxiety     Back pain at L4-L5 level     Schreiber esophagus     Bulimia     Depression     Disease of thyroid gland     hypothyroid    GERD (gastroesophageal reflux disease)     Hyperlipidemia     Hypothyroidism     Leukopenia     Synovial cyst of lumbar spine     Vertigo     Weight gain        Past Surgical History:   Procedure Laterality Date    BONE MARROW BIOPSY      BREAST SURGERY      enlargement     RHINOPLASTY         Family History   Problem Relation Age of Onset    Uterine cancer Mother     Esophageal cancer Father     Colon cancer Maternal Grandmother      I have reviewed and agree with the history as documented  Social History   Substance Use Topics    Smoking status: Never Smoker    Smokeless tobacco: Never Used    Alcohol use No        Review of Systems   Unable to perform ROS: Acuity of condition   Psychiatric/Behavioral: Positive for confusion         Physical Exam  ED Triage Vitals   Temperature Pulse Respirations Blood Pressure SpO2   08/13/18 0653 08/13/18 0653 08/13/18 0653 08/13/18 0653 08/13/18 0653   97 9 °F (36 6 °C) (!) 111 (!) 30 (!) 173/95 98 %      Temp Source Heart Rate Source Patient Position - Orthostatic VS BP Location FiO2 (%)   08/13/18 0653 08/13/18 0653 08/13/18 0653 08/13/18 0653 08/14/18 2000   Oral Monitor Sitting Right arm 40      Pain Score       08/13/18 0653       No Pain           Orthostatic Vital Signs  Vitals:    08/15/18 1402 08/15/18 1432 08/15/18 1602 08/15/18 1705   BP: 162/94 138/58 139/77 131/81   Pulse: (!) 116 (!) 110 (!) 114 100   Patient Position - Orthostatic VS:           Physical Exam   Constitutional: She appears well-developed and well-nourished  She appears distressed (tachypneic, agitated, altered)  HENT:   Head: Normocephalic and atraumatic  Nose: Nose normal    Mouth/Throat: Oropharynx is clear and moist  No oropharyngeal exudate  Eyes: Conjunctivae and EOM are normal  Pupils are equal, round, and reactive to light  Right eye exhibits no discharge  Left eye exhibits no discharge  No scleral icterus  Pupils dilated and minimally reactive   Neck: Normal range of motion  Neck supple  No JVD present  No tracheal deviation present  Cardiovascular: Regular rhythm, normal heart sounds and intact distal pulses  Exam reveals no gallop and no friction rub  No murmur heard  tachycardic   Pulmonary/Chest: Breath sounds normal  No stridor  No respiratory distress  She has no wheezes  She has no rales  She exhibits no tenderness  tachypneic   Abdominal: Soft  Bowel sounds are normal  She exhibits no distension  There is no tenderness  There is no rebound and no guarding  Genitourinary:   Genitourinary Comments: Normal genitalia   Musculoskeletal: She exhibits no edema, tenderness or deformity  Stiffened extremities with (+) clonus   Neurological: She is alert  No cranial nerve deficit  Disoriented, unable to answer questions or follow commands   Skin: Skin is warm  Capillary refill takes less than 2 seconds  No rash noted  She is diaphoretic  Psychiatric:   Agitated, anxious   Nursing note and vitals reviewed        ED Medications  Medications   chlorhexidine (PERIDEX) 0 12 % oral rinse 15 mL (15 mL Swish & Spit Given 8/15/18 1000)   enoxaparin (LOVENOX) subcutaneous injection 40 mg (40 mg Subcutaneous Given 8/15/18 1000)   propofol (DIPRIVAN) 1000 mg in 100 mL infusion (premix) (0 mcg/kg/min × 79 kg Intravenous Stopped 8/15/18 0800)   metoprolol (LOPRESSOR) injection 5 mg (not administered)   sodium chloride infusion 0 45 % (100 mL/hr Intravenous Rate/Dose Change 8/15/18 1519)   acetaminophen (TYLENOL) oral suspension 650 mg (not administered)   levothyroxine tablet 25 mcg (25 mcg Oral Given 8/15/18 0633)   diazepam (VALIUM) injection 5 mg (5 mg Intravenous Given 8/15/18 0421)   dexmedetomidine (PRECEDEX) 200 mcg in sodium chloride 0 9 % 50 mL infusion (0 mcg/kg/hr × 70 6 kg Intravenous Stopped 8/15/18 1627)   senna-docusate sodium (SENOKOT S) 8 6-50 mg per tablet 1 tablet (not administered)   sodium chloride 0 9 % bolus 500 mL (0 mL Intravenous Stopped 8/13/18 0824)   sodium chloride (HYPERTONIC) 3 % bolus 150 mL (0 mL Intravenous Stopped 8/13/18 0750)   LORazepam (ATIVAN) 2 mg/mL injection 2 mg (2 mg Intravenous Given by Other 8/13/18 0740)   sodium chloride 0 9 % bolus 500 mL (0 mL Intravenous Stopped 8/13/18 0925)   LORazepam (ATIVAN) 2 mg/mL injection 2 mg (2 mg Intravenous Given 8/13/18 0825)   diazepam (VALIUM) injection 20 mg (20 mg Intravenous Given 8/13/18 0843)   diazepam (VALIUM) injection 20 mg (20 mg Intravenous Given 8/13/18 0856)   etomidate (AMIDATE) 2 mg/mL injection 20 mg (20 mg Intravenous Given 8/13/18 0909)   succinylcholine (ANECTINE) 20 mg/mL injection 100 mg (100 mg Intravenous Given 8/13/18 0909)   pantoprazole (PROTONIX) injection 40 mg (40 mg Intravenous Given 8/13/18 1107)   furosemide (LASIX) injection 20 mg (20 mg Intravenous Given 8/13/18 1107)   LORazepam (ATIVAN) 2 mg/mL injection 2 mg (2 mg Intravenous Given 8/13/18 1257)   potassium chloride 20 mEq in dextrose 5 % 100 mL IVPB (20 mEq Intravenous New Bag 8/13/18 0752)   potassium chloride 20 mEq in dextrose 5 % 100 mL IVPB (20 mEq Intravenous New Bag 8/13/18 1409)   potassium chloride 10 % oral solution 40 mEq (40 mEq Oral Given 8/13/18 1348)   pantoprazole (PROTONIX) injection 40 mg (40 mg Intravenous Given 8/13/18 2040)   potassium chloride 20 mEq in dextrose 5 % 100 mL IVPB (0 mEq Intravenous Stopped 8/13/18 2315)   potassium chloride 10 % oral solution 40 mEq (40 mEq Oral Given 8/13/18 2030)   diazepam (VALIUM) injection 20 mg (20 mg Intravenous Given 8/13/18 2225)   potassium chloride 20 mEq in dextrose 5 % 100 mL IVPB (0 mEq Intravenous Stopped 8/14/18 0400)   potassium chloride 10 % oral solution 20 mEq (20 mEq Oral Given 8/14/18 1017)   potassium phosphate 21 mmol in sodium chloride 0 9 % 250 mL infusion (0 mmol Intravenous Stopped 8/15/18 1243)   pantoprazole (PROTONIX) injection 40 mg (40 mg Intravenous Given 8/15/18 0633)   lactated ringers bolus 1,000 mL (0 mL Intravenous Stopped 8/14/18 2230)   potassium phosphate 12 mmol in sodium chloride 0 9 % 250 mL infusion (0 mmol Intravenous Stopped 8/15/18 1243)       Diagnostic Studies  Results Reviewed     Procedure Component Value Units Date/Time    Blood culture #1 [[id number]] Collected:  08/13/18 0703    Lab Status:  Preliminary result Specimen:  Blood from Arm, Right Updated:  08/15/18 1002     Blood Culture No Growth at 48 hrs  Blood culture #2 [[id number]] Collected:  08/13/18 0703    Lab Status:  Preliminary result Specimen:  Blood from Arm, Left Updated:  08/15/18 1002     Blood Culture No Growth at 48 hrs      CBC and differential [[id number]]  (Abnormal) Collected:  08/14/18 0438    Lab Status:  Final result Specimen:  Blood from Arm, Left Updated:  08/14/18 0916     WBC 5 66 Thousand/uL      RBC 4 24 Million/uL      Hemoglobin 12 3 g/dL      Hematocrit 34 7 (L) %      MCV 82 fL      MCH 29 0 pg      MCHC 35 4 g/dL      RDW 13 5 %      MPV 8 4 (L) fL      Platelets 799 Thousands/uL      nRBC 0 /100 WBCs      Neutrophils Relative 84 (H) %      Immat GRANS % 0 %      Lymphocytes Relative 8 (L) %      Monocytes Relative 8 %      Eosinophils Relative 0 % Basophils Relative 0 %      Neutrophils Absolute 4 70 Thousands/µL      Immature Grans Absolute 0 02 Thousand/uL      Lymphocytes Absolute 0 44 (L) Thousands/µL      Monocytes Absolute 0 47 Thousand/µL      Eosinophils Absolute 0 02 Thousand/µL      Basophils Absolute 0 01 Thousands/µL     Blood gas, arterial [[id number]]  (Abnormal) Collected:  08/14/18 0522    Lab Status:  Final result Specimen:  Blood, Arterial from Radial, Right Updated:  08/14/18 0536     pH, Arterial 7 556 (H)     pCO2, Arterial 26 8 (L) mm Hg      pO2, Arterial 197 2 (H) mm Hg      HCO3, Arterial 23 2 mmol/L      Base Excess, Arterial 2 1 mmol/L      O2 Content, Arterial 18 9 mL/dL      O2 HGB,Arterial  98 4 (H) %      SOURCE Radial, Right     ANN MARIE TEST Yes     Vent Type- AC AC     AC Rate 16     Tidal Volume 450 ml      Inspired Air (FIO2) 50     PEEP 5    Magnesium [[id number]]  (Normal) Collected:  08/14/18 0438    Lab Status:  Final result Specimen:  Blood from Arm, Left Updated:  08/14/18 0513     Magnesium 2 0 mg/dL     Phosphorus [[id number]]  (Abnormal) Collected:  08/14/18 0438    Lab Status:  Final result Specimen:  Blood from Arm, Left Updated:  08/14/18 0513     Phosphorus 1 6 (L) mg/dL     BMP Q8 hours X 3 (Hyponatremia monitoring) [[id number]]  (Abnormal) Collected:  08/13/18 1707    Lab Status:  Final result Specimen:  Blood from Arm, Right Updated:  08/13/18 1750     Sodium 120 (L) mmol/L      Potassium 3 2 (L) mmol/L      Chloride 84 (L) mmol/L      CO2 25 mmol/L      Anion Gap 11 mmol/L      BUN 13 mg/dL      Creatinine 0 71 mg/dL      Glucose 100 mg/dL      Calcium 8 2 (L) mg/dL      eGFR 95 ml/min/1 73sq m     Narrative:         National Kidney Disease Education Program recommendations are as follows:  GFR calculation is accurate only with a steady state creatinine  Chronic Kidney disease less than 60 ml/min/1 73 sq  meters  Kidney failure less than 15 ml/min/1 73 sq  meters      BMP Q8 hours X 3 (Hyponatremia monitoring) [[id number]]  (Abnormal) Collected:  08/13/18 1234    Lab Status:  Final result Specimen:  Blood from Arm, Left Updated:  08/13/18 1339     Sodium 117 (L) mmol/L      Potassium 2 7 (LL) mmol/L      Chloride 78 (L) mmol/L      CO2 24 mmol/L      Anion Gap 15 (H) mmol/L      BUN 14 mg/dL      Creatinine 0 72 mg/dL      Glucose 90 mg/dL      Calcium 8 4 mg/dL      eGFR 93 ml/min/1 73sq m     Narrative:         National Kidney Disease Education Program recommendations are as follows:  GFR calculation is accurate only with a steady state creatinine  Chronic Kidney disease less than 60 ml/min/1 73 sq  meters  Kidney failure less than 15 ml/min/1 73 sq  meters  Lactic acid, plasma [[id number]]  (Abnormal) Collected:  08/13/18 0829    Lab Status:  Final result Specimen:  Blood from Arm, Left Updated:  08/13/18 0951     LACTIC ACID 8 1 (HH) mmol/L     Narrative:         Result may be elevated if tourniquet was used during collection  Osmolality, urine [[id number]]  (Normal) Collected:  08/13/18 0830    Lab Status:  Final result Specimen:  Urine from Urine, Catheter Updated:  08/13/18 0914     Osmolality, Ur 535 mmol/KG     Osmolality [[id number]]  (Abnormal) Collected:  08/13/18 0827    Lab Status:  Final result Specimen:  Blood from Arm, Left Updated:  08/13/18 0912     Osmolality Serum 237 (L) mmol/KG     Rapid drug screen, urine [[id number]]  (Normal) Collected:  08/13/18 0827    Lab Status:  Final result Specimen:  Urine from Urine, Catheter Updated:  08/13/18 0906     Amph/Meth UR Negative     Barbiturate Ur Negative     Benzodiazepine Urine Negative     Cocaine Urine Negative     Methadone Urine Negative     Opiate Urine Negative     PCP Ur Negative     THC Urine Negative    Narrative:         FOR MEDICAL PURPOSES ONLY  IF CONFIRMATION NEEDED PLEASE CONTACT THE LAB WITHIN 5 DAYS      Drug Screen Cutoff Levels:  AMPHETAMINE/METHAMPHETAMINES  1000 ng/mL  BARBITURATES     200 ng/mL  BENZODIAZEPINES     200 ng/mL  COCAINE      300 ng/mL  METHADONE      300 ng/mL  OPIATES      300 ng/mL  PHENCYCLIDINE     25 ng/mL  THC       50 ng/mL    Basic metabolic panel [[id number]]  (Abnormal) Collected:  08/13/18 0830    Lab Status:  Final result Specimen:  Blood from Arm, Left Updated:  08/13/18 0880     Sodium 115 (L) mmol/L      Potassium 3 8 mmol/L      Chloride 80 (L) mmol/L      CO2 18 (L) mmol/L      Anion Gap 17 (H) mmol/L      BUN 15 mg/dL      Creatinine 1 00 mg/dL      Glucose 122 mg/dL      Calcium 7 5 (L) mg/dL      eGFR 63 ml/min/1 73sq m     Narrative:         National Kidney Disease Education Program recommendations are as follows:  GFR calculation is accurate only with a steady state creatinine  Chronic Kidney disease less than 60 ml/min/1 73 sq  meters  Kidney failure less than 15 ml/min/1 73 sq  meters      Urine Microscopic [[id number]]  (Normal) Collected:  08/13/18 0852    Lab Status:  Final result Specimen:  Urine from Urine, Other Updated:  08/13/18 0900     RBC, UA None Seen /hpf      WBC, UA None Seen /hpf      Epithelial Cells None Seen /hpf      Bacteria, UA None Seen /hpf      Hyaline Casts, UA None Seen /lpf     Sodium, urine, random [[id number]] Collected:  08/13/18 0828    Lab Status:  Final result Specimen:  Urine from Urine, Catheter Updated:  08/13/18 0858     Sodium, Ur 43    POCT Blood Gas (CG8+) [[id number]]  (Abnormal) Collected:  08/13/18 0842    Lab Status:  Final result Updated:  08/13/18 0846     ph, Andrew ISTAT 7 384     pCO2, Andrew i-STAT 33 1 (L) mm HG      pO2, Andrew i-STAT 41 0 mm HG      BE, i-STAT -4 (L) mmol/L      HCO3, Andrew i-STAT 19 8 (L) mmol/L      CO2, i-STAT 21 mmol/L      O2 Sat, i-STAT 77 (L) %      SODIUM, I-STAT 115 (L) mmol/l      Potassium, i-STAT 4 0 mmol/L      Calcium, Ionized i-STAT 0 94 (L) mmol/L      Hct, i-STAT 39 %      Hgb, i-STAT 13 3 g/dl      Glucose, i-STAT 125 mg/dl      Specimen Type VENOUS    ED Urine Macroscopic [[id number]]  (Abnormal) Collected:  08/13/18 6704 Lab Status:  Final result Specimen:  Urine Updated:  08/13/18 0840     Color, UA Yellow     Clarity, UA Clear     pH, UA 5 5     Leukocytes, UA Negative     Nitrite, UA Negative     Protein, UA 30 (1+) (A) mg/dl      Glucose, UA Negative mg/dl      Ketones, UA 15 (1+) (A) mg/dl      Urobilinogen, UA 0 2 E U /dl      Bilirubin, UA Negative     Blood, UA Moderate (A)     Specific Eckerman, UA 1 025    Narrative:       CLINITEK RESULT    CKMB [[id number]]  (Abnormal) Collected:  08/13/18 0703    Lab Status:  Final result Specimen:  Blood from Arm, Left Updated:  08/13/18 0835     CK-MB Index 1 7 %      CK-MB FRACTION 96 8 (H) ng/mL     Blood gas, venous [[id number]] Collected:  08/13/18 0829    Lab Status:  No result Specimen:  Blood from Arm, Left     Comprehensive metabolic panel [[id number]]  (Abnormal) Collected:  08/13/18 0703    Lab Status:  Final result Specimen:  Blood from Arm, Left Updated:  08/13/18 0815     Sodium 112 (LL) mmol/L      Potassium 3 7 mmol/L      Chloride 75 (L) mmol/L      CO2 21 mmol/L      Anion Gap 16 (H) mmol/L      BUN 14 mg/dL      Creatinine 0 85 mg/dL      Glucose 109 mg/dL      Calcium 8 7 mg/dL       (H) U/L      ALT 34 U/L      Alkaline Phosphatase 75 U/L      Total Protein 7 4 g/dL      Albumin 4 1 g/dL      Total Bilirubin 1 09 (H) mg/dL      eGFR 76 ml/min/1 73sq m     Narrative:         National Kidney Disease Education Program recommendations are as follows:  GFR calculation is accurate only with a steady state creatinine  Chronic Kidney disease less than 60 ml/min/1 73 sq  meters  Kidney failure less than 15 ml/min/1 73 sq  meters      TSH, 3rd generation with Free T4 reflex [[id number]]  (Normal) Collected:  08/13/18 0703    Lab Status:  Final result Specimen:  Blood from Arm, Left Updated:  08/13/18 0812     TSH 3RD GENERATON 3 280 uIU/mL     Narrative:         Patients undergoing fluorescein dye angiography may retain small amounts of fluorescein in the body for 48-72 hours post procedure  Samples containing fluorescein can produce falsely depressed TSH values  If the patient had this procedure,a specimen should be resubmitted post fluorescein clearance  The recommended reference ranges for TSH during pregnancy are as follows:  First trimester 0 1 to 2 5 uIU/mL  Second trimester  0 2 to 3 0 uIU/mL  Third trimester 0 3 to 3 0 uIU/m      CK Total with Reflex CKMB [[id number]]  (Abnormal) Collected:  08/13/18 0703    Lab Status:  Final result Specimen:  Blood from Arm, Left Updated:  08/13/18 0812     Total CK 3,718 (H) U/L     Salicylate level [[id number]]  (Abnormal) Collected:  08/13/18 0703    Lab Status:  Final result Specimen:  Blood from Arm, Left Updated:  24/23/54 3995     Salicylate Lvl <3 (L) mg/dL     Acetaminophen level [[id number]]  (Abnormal) Collected:  08/13/18 0703    Lab Status:  Final result Specimen:  Blood from Arm, Left Updated:  08/13/18 0812     Acetaminophen Level <2 (L) ug/mL     Protime-INR [[id number]]  (Normal) Collected:  08/13/18 0718    Lab Status:  Final result Specimen:  Blood from Arm, Left Updated:  08/13/18 0745     Protime 13 3 seconds      INR 1 00    APTT [[id number]]  (Normal) Collected:  08/13/18 0718    Lab Status:  Final result Specimen:  Blood from Arm, Left Updated:  08/13/18 0745     PTT 27 seconds     Lactic acid, plasma [[id number]]  (Abnormal) Collected:  08/13/18 0703    Lab Status:  Final result Specimen:  Blood from Arm, Left Updated:  08/13/18 0745     LACTIC ACID 3 9 (HH) mmol/L     Narrative:         Result may be elevated if tourniquet was used during collection      CBC and differential [[id number]]  (Abnormal) Collected:  08/13/18 0718    Lab Status:  Final result Specimen:  Blood from Arm, Left Updated:  08/13/18 0731     WBC 9 96 Thousand/uL      RBC 4 54 Million/uL      Hemoglobin 13 5 g/dL      Hematocrit 36 5 %      MCV 80 (L) fL      MCH 29 7 pg      MCHC 37 0 g/dL      RDW 13 1 %      MPV 8 7 (L) fL      Platelets 282 Thousands/uL      nRBC 0 /100 WBCs      Neutrophils Relative 87 (H) %      Immat GRANS % 1 %      Lymphocytes Relative 7 (L) %      Monocytes Relative 5 %      Eosinophils Relative 0 %      Basophils Relative 0 %      Neutrophils Absolute 8 66 (H) Thousands/µL      Immature Grans Absolute 0 05 Thousand/uL      Lymphocytes Absolute 0 69 Thousands/µL      Monocytes Absolute 0 52 Thousand/µL      Eosinophils Absolute 0 02 Thousand/µL      Basophils Absolute 0 02 Thousands/µL     Troponin I [[id number]]  (Normal) Collected:  08/13/18 0703    Lab Status:  Final result Specimen:  Blood from Arm, Left Updated:  08/13/18 0727     Troponin I <0 02 ng/mL     Ethanol [[id number]]  (Normal) Collected:  08/13/18 0703    Lab Status:  Final result Specimen:  Blood from Arm, Left Updated:  08/13/18 0724     Ethanol Lvl <3 mg/dL     POCT Chem 8+ [[id number]]  (Abnormal) Collected:  08/13/18 0708    Lab Status:  Final result Updated:  08/13/18 0713     SODIUM, I-STAT 111 (LL) mmol/l      Potassium, i-STAT 3 5 mmol/L      Chloride, istat 73 (LL) mmol/L      CO2, i-STAT 22 mmol/L      Anion Gap, Istat 20 (H) mmol/L      Calcium, Ionized i-STAT 0 92 (L) mmol/L      BUN, I-STAT 14 mg/dl      Creatinine, i-STAT 0 7 mg/dl      eGFR 96 ml/min/1 73sq m      Glucose, i-STAT 117 mg/dl      Hct, i-STAT 40 %      Hgb, i-STAT 13 6 g/dl      Specimen Type VENOUS    POCT Blood Gas (CG8+) [[id number]]  (Abnormal) Collected:  08/13/18 0708    Lab Status:  Final result Updated:  08/13/18 0713     ph, Andrew ISTAT 7 569 (HH)     pCO2, Andrew i-STAT 25 8 (LL) mm HG      pO2, Andrew i-STAT 34 0 (L) mm HG      BE, i-STAT 3 mmol/L      HCO3, Andrew i-STAT 23 5 (L) mmol/L      CO2, i-STAT 24 mmol/L      O2 Sat, i-STAT 76 (L) %      SODIUM, I-STAT 112 (LL) mmol/l      Potassium, i-STAT 3 6 mmol/L      Calcium, Ionized i-STAT 0 90 (L) mmol/L      Hct, i-STAT 42 %      Hgb, i-STAT 14 3 g/dl      Glucose, i-STAT 113 mg/dl      Specimen Type VENOUS    POCT urinalysis dipstick [[id number]]     Lab Status:  No result Specimen:  Urine                  XR chest portable   Final Result by Bg Thomas DO (08/14 0378)      Minimal subsegmental atelectasis right perihilar region  Workstation performed: [id number]         XR chest portable   Final Result by Shelly Welch MD (08/13 6385)      Lines and tubes as above  Right perihilar and left retrocardiac atelectasis  Workstation performed: [id number]         CT head without contrast   Final Result by Lisseth Hernadez MD (08/13 3728)      No acute intracranial abnormality  Workstation performed: [id number]               Procedures  Intubation  Date/Time: 8/13/2018 9:19 AM  Performed by: Arleth Olivo by: Rachid LAUGHLIN     Patient location:  ED  Consent:     Consent obtained:  Emergent situation  Universal protocol:     Patient identity confirmed:  Arm band  Pre-procedure details:     Patient status:  Altered mental status    Pretreatment medications:  Etomidate    Paralytics:  Succinylcholine  Indications:     Indications for intubation: airway protection    Procedure details:     Preoxygenation:  Bag valve mask    Intubation method:  Oral    Oral intubation technique:  Glidescope and direct    Laryngoscope blade: Mac 4    Tube size (mm):  7 5    Tube type:  Cuffed    Number of attempts:  2  Placement assessment:     ETT to lip:  23    Tube secured with:  ETT sun    Breath sounds:  Equal and absent over the epigastrium    Placement verification: chest rise, condensation, CXR verification, direct visualization, equal breath sounds and ETCO2 detector      CXR findings:  ETT in proper place  Post-procedure details:     Patient tolerance of procedure:   Tolerated well, no immediate complications    ECG 12 Lead Documentation  Date/Time: 8/13/2018 7:02 AM  Performed by: Trudi Gardiner  Authorized by: Rachid LAUGHLIN     ECG reviewed by me, the ED Provider: yes    Patient location: ED  Previous ECG:     Previous ECG:  Unavailable  Interpretation:     Interpretation: abnormal    Quality:     Tracing quality:  Limited by artifact  Rate:     ECG rate assessment: tachycardic    Rhythm:     Rhythm: sinus tachycardia    QRS:     QRS axis:  Normal    QRS intervals:  Normal  ST segments:     ST segments:  Normal (limited evaluation secondary to artifact)  T waves:     T waves: inverted      Inverted:  AVR and V1  ECG 12 Lead Documentation  Date/Time: 8/13/2018 7:07 AM  Performed by: Berto Snyder by: Juany LAUGHLIN     ECG reviewed by me, the ED Provider: yes    Patient location:  ED  Previous ECG:     Previous ECG:  Compared to current    Similarity:  No change  Interpretation:     Interpretation: abnormal    Quality:     Tracing quality:  Limited by artifact  Rate:     ECG rate assessment: tachycardic    Rhythm:     Rhythm: sinus tachycardia    Ectopy:     Ectopy: none    QRS:     QRS axis:  Normal    QRS intervals:  Normal  ST segments:     ST segments:  Normal  T waves:     T waves: inverted      Inverted:  AVR and V1  Other findings:     Other findings: MALENA            Phone Consults  ED Phone Contact    ED Course  ED Course as of Aug 15 1815   Mon Aug 13, 2018   0740 Patient had tonic-clonic generalized seizure activity, pt given Ativan 2 mg IV and 150 ml infusion of hypertonic saline  Seizure lasted ~ 2 minutes with postictal period after event, supplemental oxygen and suctioning provided   (Dr Mary Hines) evaluated patient and additional recommendations provided  Pt requiring significant amount of anxiolytics for sedation,vitally patient seems to be improving post-intubation with new sedation regimen  All tests reviewed and discussion with [facility] regarding transfer to unit  Pt admitted to the hospital for further evaluation of presenting complaint with understanding verbalized  Report to Dr Jillian Gowers with MICU for continuation of patient care  MDM  Number of Diagnoses or Management Options  Clonus: new and requires workup  Hyponatremia: new and requires workup  Overdose of antipsychotic: new and requires workup  Seizure [facility]): new and requires workup     Amount and/or Complexity of Data Reviewed  Clinical lab tests: ordered and reviewed  Tests in the radiology section of CPT®: ordered and reviewed  Discussion of test results with the performing providers: yes  Decide to obtain previous medical records or to obtain history from someone other than the patient: yes  Review and summarize past medical records: yes  Discuss the patient with other providers: yes  Independent visualization of images, tracings, or specimens: yes    Patient Progress  Patient progress: (Critical )    CritCare Time    Disposition  Final diagnoses:   Hyponatremia   Overdose of antipsychotic   Clonus   [facility]     Time reflects when diagnosis was documented in both MDM as applicable and the Disposition within this note     Time User Action Codes Description Comment    8/13/2018  8:10 AM SrikanthAleja Mesa Add [E87 1] Hyponatremia     8/13/2018  8:10 AM SrikanthAleja santos Mesa Modify [E87 1] Hyponatremia     8/13/2018  8:10 AM SrikanthAleja Mesa Modify [E87 1] Hyponatremia     8/13/2018  8:11 AM SrikanthAleja santos Mesa Add [T43 501A] Overdose of antipsychotic     8/13/2018  8:11 AM SrikanthAleja santos Mesa Add [R25 8] Clonus     8/13/2018  8:11 AM SrikanthAleja santos Mesa Add [R56 9] Seizure ([facility] )     8/13/2018  9:54 AM Geno Guardado Modify [R56 9] Seizure ([facility] )     8/15/2018  2:59 PM Smitha Arriaga Add [F32 9] Major depressive disorder       ED Disposition     ED Disposition Condition Comment    Admit  Case was discussed with MICU and the patient's admission status was agreed to be Admission Status: inpatient status to the service of Dr Maida Aguilar  Follow-up Information    None           No discharge procedures on file      ED Provider  Attending physically available and evaluated Irene Plascencia I managed the patient along with the ED Attending      Electronically Signed by         Esme Mariscal DO  08/15/18 2789

## 2018-08-13 NOTE — ED ATTENDING ATTESTATION
Mary Jane Noel MD, saw and evaluated the patient  I have discussed the patient with the resident/non-physician practitioner and agree with the resident's/non-physician practitioner's findings, Plan of Care, and MDM as documented in the resident's/non-physician practitioner's note, except where noted  All available labs and Radiology studies were reviewed  At this point I agree with the current assessment done in the Emergency Department  I have conducted an independent evaluation of this patient a history and physical is as follows:  Pt told mother she took too many pills  Pt presented with trash bags on her feet and feces on them  Pt is altered diaphoretic tachy dilated pupils Heart tachy lungs clear neuro clonus lower ext no rigidity moving all ext equally MDM: will do nelson for ams including tox and consult to toxicology     Pt had seizure given sodium level will give 3% NSS    Pt with some worsening bp heart rate tox wants high benzo doses will intubate     Critical Care Time  The patient presented with a condition in which there was a high probability of imminent or life-threatening deterioration, and critical care services (excluding separately billable procedures) totalled 30-74 minutes          Procedures

## 2018-08-13 NOTE — ED NOTES
20mg of Etomidate and 100mg of Succinycholine given per verbal order by Dr Stephenie Bennett, RN  08/13/18 [id number]

## 2018-08-13 NOTE — CONSULTS
Consultation - Medical Toxicology  Choco Monae 62 y o  female MRN: [id number]  Unit/Bed#: ED 14 Encounter: [id number]      Reason for Consult / Principal Problem: Toxic ingestion  Inpatient consult to Toxicology  Consult performed by: Micah Whitt  Consult ordered by: Rachid LAUGHLIN        08/13/18      ASSESSMENT:  1) Severe serotonin syndrome  2) Hyponatremia  3) Altered mental status with delirium  4) Seizure  5) Metabolic acidosis with elevated anion gap (mild)  6) Acute ventilatory dependent respiratory failure  7) Tachycardia  8) Hyperthermia  9) Rhabdomyolysis    RECOMMENDATIONS:  The patient clinically has severe serotonin syndrome with AMS/ agitation, autonomic instability, and neuromuscular findings with clonus and increased tone (lower extremities greater than upper)  She is on paroxetine and likely overdosed on this  Antipsychotics generally do not cause serotonin syndrome on their own, but some atypicals do act as weak agonists at some 5HT receptors and can contribute when other agents are involved  I do not think this is NMS  NMS is typically indolent developing over days to weeks due to therapeutic medication use, and is rarely if ever seen in the setting of neuroleptic overdose  The patient does have increased muscle tone but extremities can be flexed/extended with some effort and are not truly rigid  The findings of hyperthermia (up to 100 8 F thus far) and seizures are common in severe serotonin syndrome and portend a poor outcome if not aggressively treated  I think the rhabdomyolysis is highly likely due to the increased neuromuscular activity  Again the seizures are likely primarily secondary to severe serotonin syndrome with some contribution/ lowering of threshold from the hyponatremia  The patient has been intubated and is currently being sedated with propofol  She received 4 mg IV lorazepam in the ED and then 20 mg IV diazepam in the ED    Her temperature has improved with the benzos  Hyponatremia and SIADH can be seen with SSRI use, but is generally an indolent process and not in the setting of overdose  It is possible that the patient had pre-existing hyponatremia from her SSRI use prior to these events  There is also questionable history of alcoholism, and beer potomania would be another consideration  With the history of alcoholism I would also consider a component of alcohol withdrawal in the patient's clinical picture, however I think given history and exam the primary issue is clearly severe serotonin syndrome  Of note, atypical antipsychotics including the ones the patient is on can sometimes lead to QRS widening, QT prolongation, mild hypotension, and antimuscarinic findings  This is due to multiple mechanisms of these medications (on top of their primary ones) including Na+ channel blockade, K+ channel blockade, alpha-1 antagonism, and M1-M3 and H1 antagonism      - Mainstay of treatment for serotonin syndrome is benzodiazepines, particularly to treat the autonomic instability and the agitation as well  Propofol in an intubated patient is also appropriate  Can continue the propofol with push doses of benzos as needed  Often high doses of benzodiazepines are needed to be effective  - Seizures and hyperthermia portend poor prognosis if not aggressively treated  If the patient again develops these findings in spite of aggressive sedation, she will need to be paralyzed with a nondepolarizing agent      -There is no good evidence for benefit of cyproheptadine in severe serotonin syndrome and it also has antimuscarinic properties which could worsen the patient's autonomic findings  Would not recommend giving this  - There is no role for whole bowel irrigation, gastric lavage, or enhanced elimination  WBI and gastric lavage overall have been shown to cause harm while not having any appreciable benefit       - There is no need for EEG monitoring at this time  Seizures of toxicological etiology are self limited and there is no clinical/ diagnostic benefit from EEG  There is no need for the patient to be started on antiepileptics after her hospital course  - Sodium has improved from 112 to 115 with hypertonic saline  I do not think it is the primary cause of seizures  It is appropriate to replete with normal saline at his time per usual hyponatremia protocols  Would recommend workup for SIADH and other causes of hyponatremia      - Continuous cardiac monitoring  Very unlikely at this point to have significant QRS widening or QTc prolongation, but if this develops please contact me right away to discuss further recommendations  - I think the development of mild metabolic acidosis is likely secondary to seizures  I do not have suspicion for toxic alcohol ingestion, etc   Continue to monitor BMP and please contact me if significantly worsening acidosis  I will continue to follow along as well  - Continued medical management for rhabdomyolysis     - The patient also takes levothyroxine  In OD it takes 3-7 days to develop signs/ symptoms of thyrotoxicosis and I do not think this is related to the patient's presentation  We will need to monitor over the next week however  - I would expect continuation of severe toxicity for at least 24 hours, though could last for up to 48-72 hours  Recommend continuing sedation for the next 24 hours and can reassess in the morning  For further questions, please call [facility]'s  Service or Patient Access Center to reach the medical  on call  Please see additional teaching note below      Medical Toxicology Teaching Note  [address]  Serotonin Syndrome  Updated 10/20/2011    Introduction: The most common severe adverse effect of SSRIs such as escitalopram is serotonergic syndrome    This constellation of signs/sxs was first reported in patients taking MAOIs who were given another drug that enhanced serotonergic activity  Pathophysiology:  While the mechanism is not fully understood, it involves excessive stimulation of the serotonin 5-HT2A receptors  Clinical Manifestations: The classic triad of serotonin syndrome consists of AMS, autonomic instability, and neuromuscular hyperactivity  Symptoms include agitation, myoclonus, hyperreflexia (greater in lower extremities than upper extremities), diaphoresis, tremor, diarrhea, incoordination, muscle rigidity, and hyperthermia  Minor manifestations are common when initiating SSRIs  Life-threatening toxicity results from hyperthermia which is caused by excessive muscle activity  Prolonged hyperthermia can cause protein denaturing, enzymatic dysfunction, metabolic acidosis, rhabdomyolysis, myoglobinuria, renal failure, hepatic injury, DIC or ARDS  Diagnosis:  While several diagnostic criteria exist a simplified modality is to evaluate for the triad of autonomic instability, AMS, and neuromuscular hyperactivity with exposure to as SSRI, MAOI, SNRI, or other offending medication  It should be noted that patients typically must be exposed to multiple serotonergic agents or take a massive overdose in order to develop serotonergic syndrome  Management:  Treatment focuses on limiting the neuromuscular hyperactivity since this is what will lead to the fatal hyperthermia  1) Benzodiazepines are the first line agent for limiting this hyperactivity  2) While there is some evidence that cyproheptadine may decrease symptoms the benefit appears to be limited to those patients with mild to moderate toxicity  Furthermore, since cyproheptadine is only available in PO formulation, its use is limited in the severely toxic patient (consider administering through an NG tube)          3) When these modalities are ineffective in preventing hyperthermia, neuromuscular blockade is necessary (i e  Vecuronium)  References:  Isela Nephew  Poisoning & Drug Overdose  2007  Alexi Wilkerson Toxicologic Emergencies  2006  Hx and PE limited by: Altered mental status, intubation and sedation, no family or friends present at time of exam    HPI: Choco Monae is a 62y o  year old female who presents after report of medication overdose  Per ED report the patient contacted her mother this morning to inform her that she had taken several of her ziprasidone  EMS was called and when they arrived they found the patient altered, diaphoretic, tachycardic, and hypertensive with increased muscle tone  Mother reported that the patient also takes paroxetine and quetiapine  Additionally there is report that patient has prior history of alcoholism  No other history was available  When I initially arrived to the ED the patient was altered and agitated, unable to answer questions or follow commands  She was quite tachycardic, hypertensive and diaphoretic, and was also hyperthermic with core temp of 100 8  This has all improved someone with aggressive administration of benzodiazepines and propofol  I can not obtain further history due to patient initially with AMS and then subsequently intubated and sedated      Review of Systems   Unable to perform ROS: Mental status change       Historical Information   Past Medical History:   Diagnosis Date    Anxiety     Back pain at L4-L5 level     Scheriber esophagus     Bulimia     Depression     Disease of thyroid gland     hypothyroid    GERD (gastroesophageal reflux disease)     Hyperlipidemia     Hypothyroidism     Leukopenia     Synovial cyst of lumbar spine     Vertigo     Weight gain      Past Surgical History:   Procedure Laterality Date    BONE MARROW BIOPSY      BREAST SURGERY      enlargement     RHINOPLASTY       Social History   History   Alcohol Use No     History   Drug Use No     History   Smoking Status    Never Smoker Smokeless Tobacco    Never Used     Family History   Problem Relation Age of Onset    Uterine cancer Mother     Esophageal cancer Father     Colon cancer Maternal Grandmother         Prior to Admission medications    Medication Sig Start Date End Date Taking? Authorizing Provider   alendronate (FOSAMAX) 70 mg tablet Take 1 tablet (70 mg total) by mouth every 7 days for 90 days 6/19/18 9/17/18  Patel Parish DO   bisacodyl (DULCOLAX) 5 mg EC tablet Take by mouth 4/10/14   Historical Provider, MD   calcium carbonate-vitamin D (OSCAL-D) 500 mg-200 units per tablet Take 1 tablet by mouth 2 (two) times a day with meals for 30 days 6/19/18 7/19/18  Patel Parish DO   diclofenac potassium (CATAFLAM) 50 mg tablet TAKE 1 TABLET BY MOUTH TWICE A DAY AS NEEDED 7/2/18   Desean Caldwell PA-C   docusate sodium (COLACE) 100 mg capsule Take 1 capsule by mouth 2 (two) times a day 6/8/17   Historical Provider, MD   ferrous sulfate 325 (65 Fe) mg tablet Take 1 tablet (325 mg total) by mouth 2 (two) times a day for 90 days 6/25/18 9/23/18  Patel Parish DO   fluticasone [facility]) 50 mcg/act nasal spray 1 spray into each nostril daily for 14 days 5/25/18 6/8/18  Patel Parish DO   levothyroxine 25 mcg tablet Take 25 mcg by mouth daily  Historical Provider, MD   levothyroxine 25 mcg tablet Take 1 tablet by mouth daily 5/2/17   Historical Provider, MD   levothyroxine 25 mcg tablet TAKE 1 TABLET EVERY DAY 7/16/18   Sri Rodriguez DO   LORazepam (ATIVAN) 2 mg tablet Take 2 mg by mouth every 6 (six) hours as needed for anxiety  Historical Provider, MD   LORazepam (ATIVAN) 2 mg tablet Take 1 tablet by mouth 4 (four) times a day    Historical Provider, MD   Methylprednisolone 4 MG TBPK 24mg PO on day 1, then decrease by 4mg/day x 5 days per dose pack instructions  3/26/18   Desean Caldwell PA-C   omeprazole (PriLOSEC) 40 MG capsule Take 40 mg by mouth 2 (two) times a day        Historical Provider, MD   omeprazole (PriLOSEC) 40 MG capsule Take 80 mg by mouth daily at bedtime  Historical Provider, MD   omeprazole (PriLOSEC) 40 MG capsule Take 1 capsule by mouth 2 (two) times a day 2/24/11   Historical Provider, MD   omeprazole (PriLOSEC) 40 MG capsule TAKE 1 CAPSULE TWICE DAILY  6/20/18   Sri Rodriguez DO   PARoxetine (PAXIL) 30 mg tablet Take 30 mg by mouth every morning  Historical Provider, MD   PARoxetine (PAXIL) 30 mg tablet Take 1 tablet by mouth 3 (three) times a day    Historical Provider, MD   QUEtiapine (SEROquel) 200 mg tablet Take 200 mg by mouth 2 (two) times a day        Historical Provider, MD   QUEtiapine (SEROQUEL) 200 mg tablet Take 2 tablets by mouth daily at bedtime 4/22/11   Historical Provider, MD   QUEtiapine (SEROquel) 400 MG tablet Take 400 mg by mouth daily at bedtime 3/5/18   Historical Provider, MD   triamcinolone (KENALOG) 0 1 % cream  1/7/18   Historical Provider, MD   ziprasidone (GEODON) 80 mg capsule Take 80 mg by mouth daily      Historical Provider, MD   ziprasidone (GEODON) 80 mg capsule Take 2 capsules by mouth daily at bedtime   4/21/11   Historical Provider, MD       Current Facility-Administered Medications   Medication Dose Route Frequency    chlorhexidine (PERIDEX) 0 12 % oral rinse 15 mL  15 mL Swish & Spit Q12H [address]    diazepam (VALIUM) injection 20 mg  20 mg Intravenous Q4H PRN    enoxaparin (LOVENOX) subcutaneous injection 40 mg  40 mg Subcutaneous Daily    pantoprazole (PROTONIX) 80 mg in sodium chloride 0 9 % 100 mL infusion  8 mg/hr Intravenous Continuous    pantoprazole (PROTONIX) injection 40 mg  40 mg Intravenous Daily    propofol (DIPRIVAN) 1000 mg in 100 mL infusion (premix)  5-50 mcg/kg/min Intravenous Titrated    sodium chloride 0 9 % infusion  100 mL/hr Intravenous Continuous       Allergies   Allergen Reactions    Latex     Risperdal [Risperidone]     Zyprexa [Olanzapine]        Objective       Intake/Output Summary (Last 24 hours) at 08/13/18 1007  Last data filed at 08/13/18 0920   Gross per 24 hour   Intake                0 ml   Output              900 ml   Net             -900 ml       Invasive Devices:   Peripheral IV 08/13/18 Left Hand (Active)   Site Assessment Clean; Intact;Dry 8/13/2018  6:55 AM   Dressing Type Transparent 8/13/2018  6:55 AM   Line Status Blood return noted 8/13/2018  6:55 AM   Dressing Status Clean;Dry; Intact 8/13/2018  6:55 AM       Peripheral IV 08/13/18 Left Antecubital (Active)   Site Assessment Clean;Dry; Intact 8/13/2018  7:02 AM   Dressing Type Transparent 8/13/2018  7:02 AM   Line Status Blood return noted 8/13/2018  7:02 AM   Dressing Status Clean;Dry; Intact 8/13/2018  7:02 AM       Peripheral IV 08/13/18 Right Forearm (Active)   Site Assessment Clean;Dry; Intact 8/13/2018  7:02 AM   Dressing Type Transparent 8/13/2018  7:02 AM   Line Status Blood return noted 8/13/2018  7:02 AM   Dressing Status Clean;Dry; Intact 8/13/2018  7:02 AM       Urethral Catheter Temperature probe 16 Fr  (Active)   Site Assessment Clean;Skin intact 8/13/2018  8:03 AM   Collection Container Standard drainage bag 8/13/2018  8:03 AM   Securement Method Securing device (Describe) 8/13/2018  8:03 AM       Vitals   Vitals:    08/13/18 0815 08/13/18 0830 08/13/18 0845 08/13/18 0915   BP:  (!) 153/104 (!) 156/106 (!) 170/113   TempSrc:    Tympanic Core   Pulse: (!) 142 (!) 144 (!) 140 (!) 110   Resp: (!) 31 (!) 28 (!) 45 22   Patient Position - Orthostatic VS:  Lying Lying Lying   Temp: 99 7 °F (37 6 °C) 100 4 °F (38 °C) 100 4 °F (38 °C) 99 3 °F (37 4 °C)       Physical Exam   Constitutional: She appears well-developed and well-nourished  She appears distressed  HENT:   Head: Normocephalic and atraumatic  Mouth/Throat: Oropharynx is clear and moist    Eyes: Conjunctivae are normal    Pupils 7mm,reactive bilaterally  Unable to assess EOM   Neck: Neck supple  Cardiovascular: Normal heart sounds and intact distal pulses      Tachycardic, regular   Pulmonary/Chest: Effort normal and breath sounds normal  No respiratory distress  She has no wheezes  She has no rales  Abdominal: Soft  Bowel sounds are normal  She exhibits no distension  Musculoskeletal: She exhibits no edema  Significantly increased tone in all extremities, lower greater than upper  3+ bilateral patellar DTR's   10-20 beat inducible ankle clonus  Moves all extremities spontaneously  Neurological:   Severely agitated, confused  Does not answer questions or follow commands  No evident gross focal deficit  Skin: Skin is warm  She is diaphoretic  No erythema  Moist   Psychiatric:   Agitated   Vitals reviewed  EKG, Pathology, and Other Studies: I have personally reviewed pertinent reports  Lab Results: I have personally reviewed pertinent reports  Labs:    Results from last 7 days  Lab Units 08/13/18  0842 08/13/18  0718   WBC Thousand/uL  --  9 96   HEMOGLOBIN g/dL  --  13 5   I STAT HEMOGLOBIN g/dl 13 3  --    HEMATOCRIT %  --  36 5   PLATELETS Thousands/uL  --  355   NEUTROS PCT %  --  87*   LYMPHS PCT %  --  7*   MONOS PCT %  --  5      Results from last 7 days  Lab Units 08/13/18  0842 08/13/18  0830  08/13/18  0703   SODIUM mmol/L  --  115*  --  112*   POTASSIUM mmol/L  --  3 8  --  3 7   CHLORIDE mmol/L  --  80*  --  75*   CO2 mmol/L  --  18*  --  21   BUN mg/dL  --  15  --  14   CREATININE mg/dL  --  1 00  --  0 85   CALCIUM mg/dL  --  7 5*  --  8 7   TOTAL PROTEIN g/dL  --   --   --  7 4   BILIRUBIN TOTAL mg/dL  --   --   --  1 09*   ALK PHOS U/L  --   --   --  75   ALT U/L  --   --   --  34   AST U/L  --   --   --  120*   GLUCOSE RANDOM mg/dL  --  122  --  109   GLUCOSE, ISTAT mg/dl 125  --   < >  --    < > = values in this interval not displayed       Results from last 7 days  Lab Units 08/13/18  0718   INR  1 00   PTT seconds 27       Results from last 7 days  Lab Units 08/13/18  0829 08/13/18  0703   LACTIC ACID mmol/L 8 1* 3 9*       0  Lab Value Date/Time   TROPONINI <0 02 08/13/2018 0703           Results from last 7 days  Lab Units 08/13/18  0703   ACETAMINOPHEN LVL ug/mL <2*   ETHANOL LVL mg/dL <3   SALICYLATE LVL mg/dL <3*             Counseling / Coordination of Care  Total floor / unit time spent today 120 minutes  Greater than 50% of total time was spent with the patient and / or family counseling and / or coordination of care  Minutes of critical care time:  95  -Critical care time was exclusive of seperately bilable procedures and treating other patients as well as teaching time   -Critical care was necessary to treat or prevent imminent or life-threatening deterioration of the following condition: circulatory failure, respiratory failure, toxidrome (serotonin syndrome)  -Critical care time was spent personally by me on the following activities as well as the above as per the ED course and rest of chart: blood draw for specimens, obtaining history from patient/surrogate, developement of a treatment plan, discussions with primary provider(s), evaluation of patient's response to treatment of serotonin syndrome, examination of the patient, recommending treatements and interventions, re-evaluation of the patient's condition, recommending/reviewing laboratory studies

## 2018-08-13 NOTE — ED NOTES
Dr Kathie Staples and Dr Daron Knowles at patients bedside for intubation         Oscar Pablo RN  08/13/18 9577

## 2018-08-13 NOTE — RESPIRATORY THERAPY NOTE
RT Protocol Note  [facility] 62 y o  female MRN: [id number]  Unit/Bed#: MICU 01 Encounter: [id number]    Assessment    Principal Problem:    Status epilepticus ([address] )  Active Problems:    Hyponatremia    Serotonin syndrome    Delirium    Acute respiratory failure (HCC)    Metabolic acidosis, increased anion gap    Hyperthermia    Tachycardia    Non-traumatic rhabdomyolysis      Home Pulmonary Medications:  None       Past Medical History:   Diagnosis Date    Anxiety     Back pain at L4-L5 level     Schreiber esophagus     Bulimia     Depression     Disease of thyroid gland     hypothyroid    GERD (gastroesophageal reflux disease)     Hyperlipidemia     Hypothyroidism     Leukopenia     Synovial cyst of lumbar spine     Vertigo     Weight gain      Social History     Social History    Marital status: Single     Spouse name: N/A    Number of children: N/A    Years of education: N/A     Social History Main Topics    Smoking status: Never Smoker    Smokeless tobacco: Never Used    Alcohol use No    Drug use: No    Sexual activity: Not Asked     Other Topics Concern    None     Social History Narrative    Family disruption death of family member parent, per allscripts       Subjective         Objective    Physical Exam:   Assessment Type: (P) Assess only  General Appearance: (P) Sedated  Respiratory Pattern: (P) Normal  Chest Assessment: (P) Chest expansion symmetrical  Bilateral Breath Sounds: (P) Rhonchi    Vitals:  Blood pressure (!) 170/113, pulse (!) (P) 107, temperature 99 3 °F (37 4 °C), temperature source Tympanic Core, resp  rate (P) 22, height 5' 3" (1 6 m), weight 70 6 kg (155 lb 10 3 oz), SpO2 (P) 99 %  Imaging and other studies: I have personally reviewed pertinent reports  Plan    Respiratory Plan: Vent/NIV/HFNC        Resp Comments: (P) pt admitted from ED to MICU with apparent drug OD and subsequesnt seizures  Pt has no past respiratory history    No respiratory home meds taken by patient  No treatments needed however pt was intubated in ED and is currently on vent

## 2018-08-13 NOTE — SOCIAL WORK
CM was in contact with Pt's mother Avila Venegas [phone] with an introduction and explanation of role  Avila Venegas reported the Pt resides with her in a ranch style home with no previous use of DME and 1 step to enter the home  Avila Venegas reported the Pt was independent with ADLs with no hx of VNA, SNF, mental health or drug/alcohol placements  Pt was denied having a living will, uses [facility] pharmacy on [address] and has Dr Ashley Estrada as a PCP  Avila Venegas reported missing the Pt but reported feeling safe and is independent with ADLs for all self care  Avila Venegas reported she didn't eat today due to being concern for the Pt but does have access to food in her home as needed  Avila Venegas reported having support from neighbors and her  if she should need anything, including cat food  CM will continue to follow

## 2018-08-14 ENCOUNTER — HOSPITAL ENCOUNTER (OUTPATIENT)
Dept: RADIOLOGY | Facility: CLINIC | Age: 58
Discharge: HOME | DRG: 917 | End: 2018-08-14
Payer: COMMERCIAL

## 2018-08-14 ENCOUNTER — APPOINTMENT (INPATIENT)
Dept: NEUROLOGY | Facility: AMBULATORY SURGERY CENTER | Age: 58
DRG: 917 | End: 2018-08-14
Payer: COMMERCIAL

## 2018-08-14 ENCOUNTER — APPOINTMENT (INPATIENT)
Dept: RADIOLOGY | Facility: HOSPITAL | Age: 58
DRG: 917 | End: 2018-08-14
Payer: COMMERCIAL

## 2018-08-14 PROBLEM — T50.901A OVERDOSE: Status: ACTIVE | Noted: 2018-08-14

## 2018-08-14 PROBLEM — E83.39 HYPOPHOSPHATEMIA: Status: ACTIVE | Noted: 2018-08-14

## 2018-08-14 LAB
ANION GAP SERPL CALCULATED.3IONS-SCNC: 6 MMOL/L (ref 4–13)
ANION GAP SERPL CALCULATED.3IONS-SCNC: 7 MMOL/L (ref 4–13)
ANION GAP SERPL CALCULATED.3IONS-SCNC: 8 MMOL/L (ref 4–13)
ANION GAP SERPL CALCULATED.3IONS-SCNC: 9 MMOL/L (ref 4–13)
ARTERIAL PATENCY WRIST A: YES
ATRIAL RATE: 86 BPM
BASE EXCESS BLDA CALC-SCNC: 2.1 MMOL/L
BASOPHILS # BLD AUTO: 0.01 THOUSANDS/ΜL (ref 0–0.1)
BASOPHILS # BLD AUTO: 0.02 THOUSANDS/ΜL (ref 0–0.1)
BASOPHILS NFR BLD AUTO: 0 % (ref 0–1)
BASOPHILS NFR BLD AUTO: 0 % (ref 0–1)
BUN SERPL-MCNC: 5 MG/DL (ref 5–25)
BUN SERPL-MCNC: 7 MG/DL (ref 5–25)
BUN SERPL-MCNC: 7 MG/DL (ref 5–25)
CALCIUM SERPL-MCNC: 7.5 MG/DL (ref 8.3–10.1)
CALCIUM SERPL-MCNC: 7.6 MG/DL (ref 8.3–10.1)
CALCIUM SERPL-MCNC: 7.9 MG/DL (ref 8.3–10.1)
CALCIUM SERPL-MCNC: 8 MG/DL (ref 8.3–10.1)
CALCIUM SERPL-MCNC: 8 MG/DL (ref 8.3–10.1)
CALCIUM SERPL-MCNC: 8.3 MG/DL (ref 8.3–10.1)
CHLORIDE SERPL-SCNC: 91 MMOL/L (ref 100–108)
CHLORIDE SERPL-SCNC: 93 MMOL/L (ref 100–108)
CHLORIDE SERPL-SCNC: 93 MMOL/L (ref 100–108)
CHLORIDE SERPL-SCNC: 94 MMOL/L (ref 100–108)
CHLORIDE SERPL-SCNC: 95 MMOL/L (ref 100–108)
CHLORIDE SERPL-SCNC: 95 MMOL/L (ref 100–108)
CK MB SERPL-MCNC: 13.2 NG/ML (ref 0–5)
CK MB SERPL-MCNC: 61.7 NG/ML (ref 0–5)
CK MB SERPL-MCNC: <1 % (ref 0–2.5)
CK MB SERPL-MCNC: <1 % (ref 0–2.5)
CK SERPL-CCNC: 6559 U/L (ref 26–192)
CK SERPL-CCNC: ABNORMAL U/L (ref 26–192)
CO2 SERPL-SCNC: 22 MMOL/L (ref 21–32)
CO2 SERPL-SCNC: 23 MMOL/L (ref 21–32)
CO2 SERPL-SCNC: 24 MMOL/L (ref 21–32)
CO2 SERPL-SCNC: 24 MMOL/L (ref 21–32)
CO2 SERPL-SCNC: 25 MMOL/L (ref 21–32)
CO2 SERPL-SCNC: 27 MMOL/L (ref 21–32)
CREAT SERPL-MCNC: 0.53 MG/DL (ref 0.6–1.3)
CREAT SERPL-MCNC: 0.63 MG/DL (ref 0.6–1.3)
CREAT SERPL-MCNC: 0.64 MG/DL (ref 0.6–1.3)
CREAT SERPL-MCNC: 0.65 MG/DL (ref 0.6–1.3)
CREAT SERPL-MCNC: 0.67 MG/DL (ref 0.6–1.3)
CREAT SERPL-MCNC: 0.74 MG/DL (ref 0.6–1.3)
EOSINOPHIL # BLD AUTO: 0.02 THOUSAND/ΜL (ref 0–0.61)
EOSINOPHIL # BLD AUTO: 0.03 THOUSAND/ΜL (ref 0–0.61)
EOSINOPHIL NFR BLD AUTO: 0 % (ref 0–6)
EOSINOPHIL NFR BLD AUTO: 1 % (ref 0–6)
ERYTHROCYTE [DISTWIDTH] IN BLOOD BY AUTOMATED COUNT: 13.5 % (ref 11.6–15.1)
ERYTHROCYTE [DISTWIDTH] IN BLOOD BY AUTOMATED COUNT: 13.9 % (ref 11.6–15.1)
GFR SERPL CREATININE-BSD FRML MDRD: 100 ML/MIN/1.73SQ M
GFR SERPL CREATININE-BSD FRML MDRD: 106 ML/MIN/1.73SQ M
GFR SERPL CREATININE-BSD FRML MDRD: 90 ML/MIN/1.73SQ M
GFR SERPL CREATININE-BSD FRML MDRD: 98 ML/MIN/1.73SQ M
GFR SERPL CREATININE-BSD FRML MDRD: 99 ML/MIN/1.73SQ M
GFR SERPL CREATININE-BSD FRML MDRD: 99 ML/MIN/1.73SQ M
GLUCOSE SERPL-MCNC: 105 MG/DL (ref 65–140)
GLUCOSE SERPL-MCNC: 109 MG/DL (ref 65–140)
GLUCOSE SERPL-MCNC: 81 MG/DL (ref 65–140)
GLUCOSE SERPL-MCNC: 82 MG/DL (ref 65–140)
GLUCOSE SERPL-MCNC: 85 MG/DL (ref 65–140)
GLUCOSE SERPL-MCNC: 91 MG/DL (ref 65–140)
GLUCOSE SERPL-MCNC: 97 MG/DL (ref 65–140)
GLUCOSE SERPL-MCNC: 98 MG/DL (ref 65–140)
GLUCOSE SERPL-MCNC: 99 MG/DL (ref 65–140)
HCO3 BLDA-SCNC: 23.2 MMOL/L (ref 22–28)
HCT VFR BLD AUTO: 34.7 % (ref 34.8–46.1)
HCT VFR BLD AUTO: 39.9 % (ref 34.8–46.1)
HGB BLD-MCNC: 12.3 G/DL (ref 11.5–15.4)
HGB BLD-MCNC: 13.7 G/DL (ref 11.5–15.4)
HOROWITZ INDEX BLDA+IHG-RTO: 50 MM[HG]
IMM GRANULOCYTES # BLD AUTO: 0.02 THOUSAND/UL (ref 0–0.2)
IMM GRANULOCYTES # BLD AUTO: 0.04 THOUSAND/UL (ref 0–0.2)
IMM GRANULOCYTES NFR BLD AUTO: 0 % (ref 0–2)
IMM GRANULOCYTES NFR BLD AUTO: 1 % (ref 0–2)
LYMPHOCYTES # BLD AUTO: 0.44 THOUSANDS/ΜL (ref 0.6–4.47)
LYMPHOCYTES # BLD AUTO: 0.58 THOUSANDS/ΜL (ref 0.6–4.47)
LYMPHOCYTES NFR BLD AUTO: 10 % (ref 14–44)
LYMPHOCYTES NFR BLD AUTO: 8 % (ref 14–44)
MAGNESIUM SERPL-MCNC: 2 MG/DL (ref 1.6–2.6)
MCH RBC QN AUTO: 29 PG (ref 26.8–34.3)
MCH RBC QN AUTO: 29.3 PG (ref 26.8–34.3)
MCHC RBC AUTO-ENTMCNC: 34.3 G/DL (ref 31.4–37.4)
MCHC RBC AUTO-ENTMCNC: 35.4 G/DL (ref 31.4–37.4)
MCV RBC AUTO: 82 FL (ref 82–98)
MCV RBC AUTO: 85 FL (ref 82–98)
MONOCYTES # BLD AUTO: 0.47 THOUSAND/ΜL (ref 0.17–1.22)
MONOCYTES # BLD AUTO: 0.72 THOUSAND/ΜL (ref 0.17–1.22)
MONOCYTES NFR BLD AUTO: 12 % (ref 4–12)
MONOCYTES NFR BLD AUTO: 8 % (ref 4–12)
NEUTROPHILS # BLD AUTO: 4.56 THOUSANDS/ΜL (ref 1.85–7.62)
NEUTROPHILS # BLD AUTO: 4.7 THOUSANDS/ΜL (ref 1.85–7.62)
NEUTS SEG NFR BLD AUTO: 76 % (ref 43–75)
NEUTS SEG NFR BLD AUTO: 84 % (ref 43–75)
NRBC BLD AUTO-RTO: 0 /100 WBCS
NRBC BLD AUTO-RTO: 0 /100 WBCS
O2 CT BLDA-SCNC: 18.9 ML/DL (ref 16–23)
OXYHGB MFR BLDA: 98.4 % (ref 94–97)
P AXIS: 78 DEGREES
PCO2 BLDA: 26.8 MM HG (ref 36–44)
PEEP RESPIRATORY: 5 CM[H2O]
PH BLDA: 7.56 [PH] (ref 7.35–7.45)
PHOSPHATE SERPL-MCNC: 1.6 MG/DL (ref 2.7–4.5)
PLATELET # BLD AUTO: 214 THOUSANDS/UL (ref 149–390)
PLATELET # BLD AUTO: 223 THOUSANDS/UL (ref 149–390)
PMV BLD AUTO: 8.4 FL (ref 8.9–12.7)
PMV BLD AUTO: 8.4 FL (ref 8.9–12.7)
PO2 BLDA: 197.2 MM HG (ref 75–129)
POTASSIUM SERPL-SCNC: 3.7 MMOL/L (ref 3.5–5.3)
POTASSIUM SERPL-SCNC: 3.7 MMOL/L (ref 3.5–5.3)
POTASSIUM SERPL-SCNC: 3.8 MMOL/L (ref 3.5–5.3)
POTASSIUM SERPL-SCNC: 3.8 MMOL/L (ref 3.5–5.3)
POTASSIUM SERPL-SCNC: 4.1 MMOL/L (ref 3.5–5.3)
POTASSIUM SERPL-SCNC: 4.9 MMOL/L (ref 3.5–5.3)
PR INTERVAL: 121 MS
QRS AXIS: 60 DEGREES
QRSD INTERVAL: 71 MS
QT INTERVAL: 392 MS
QTC INTERVAL: 469 MS
RBC # BLD AUTO: 4.24 MILLION/UL (ref 3.81–5.12)
RBC # BLD AUTO: 4.67 MILLION/UL (ref 3.81–5.12)
SODIUM SERPL-SCNC: 122 MMOL/L (ref 136–145)
SODIUM SERPL-SCNC: 125 MMOL/L (ref 136–145)
SODIUM SERPL-SCNC: 125 MMOL/L (ref 136–145)
SODIUM SERPL-SCNC: 126 MMOL/L (ref 136–145)
SODIUM SERPL-SCNC: 126 MMOL/L (ref 136–145)
SODIUM SERPL-SCNC: 128 MMOL/L (ref 136–145)
SPECIMEN SOURCE: ABNORMAL
T WAVE AXIS: 80 DEGREES
VENT AC: 16
VENT- AC: AC
VENTRICULAR RATE: 86 BPM
VT SETTING VENT: 450 ML
WBC # BLD AUTO: 5.66 THOUSAND/UL (ref 4.31–10.16)
WBC # BLD AUTO: 5.95 THOUSAND/UL (ref 4.31–10.16)

## 2018-08-14 PROCEDURE — 71045 X-RAY EXAM CHEST 1 VIEW: CPT

## 2018-08-14 PROCEDURE — 85025 COMPLETE CBC W/AUTO DIFF WBC: CPT | Performed by: PHYSICIAN ASSISTANT

## 2018-08-14 PROCEDURE — 83735 ASSAY OF MAGNESIUM: CPT | Performed by: PHYSICIAN ASSISTANT

## 2018-08-14 PROCEDURE — 93010 ELECTROCARDIOGRAM REPORT: CPT | Performed by: INTERNAL MEDICINE

## 2018-08-14 PROCEDURE — 82948 REAGENT STRIP/BLOOD GLUCOSE: CPT

## 2018-08-14 PROCEDURE — 93005 ELECTROCARDIOGRAM TRACING: CPT

## 2018-08-14 PROCEDURE — 95951 PR EEG MONITORING/VIDEORECORD: CPT | Performed by: PSYCHIATRY & NEUROLOGY

## 2018-08-14 PROCEDURE — 82553 CREATINE MB FRACTION: CPT | Performed by: INTERNAL MEDICINE

## 2018-08-14 PROCEDURE — 99291 CRITICAL CARE FIRST HOUR: CPT | Performed by: EMERGENCY MEDICINE

## 2018-08-14 PROCEDURE — 82805 BLOOD GASES W/O2 SATURATION: CPT | Performed by: PHYSICIAN ASSISTANT

## 2018-08-14 PROCEDURE — 84100 ASSAY OF PHOSPHORUS: CPT | Performed by: PHYSICIAN ASSISTANT

## 2018-08-14 PROCEDURE — 82550 ASSAY OF CK (CPK): CPT | Performed by: INTERNAL MEDICINE

## 2018-08-14 PROCEDURE — 99233 SBSQ HOSP IP/OBS HIGH 50: CPT | Performed by: INTERNAL MEDICINE

## 2018-08-14 PROCEDURE — 95951 HB EEG MONITORING/VIDEORECORD: CPT

## 2018-08-14 PROCEDURE — 94760 N-INVAS EAR/PLS OXIMETRY 1: CPT

## 2018-08-14 PROCEDURE — 94003 VENT MGMT INPAT SUBQ DAY: CPT

## 2018-08-14 PROCEDURE — 36600 WITHDRAWAL OF ARTERIAL BLOOD: CPT

## 2018-08-14 PROCEDURE — 80048 BASIC METABOLIC PNL TOTAL CA: CPT | Performed by: INTERNAL MEDICINE

## 2018-08-14 RX ORDER — PANTOPRAZOLE SODIUM 40 MG/1
40 INJECTION, POWDER, FOR SOLUTION INTRAVENOUS
Status: COMPLETED | OUTPATIENT
Start: 2018-08-15 | End: 2018-08-15

## 2018-08-14 RX ORDER — ACETAMINOPHEN 160 MG/5ML
650 SUSPENSION, ORAL (FINAL DOSE FORM) ORAL EVERY 4 HOURS PRN
Status: DISCONTINUED | OUTPATIENT
Start: 2018-08-14 | End: 2018-08-20 | Stop reason: HOSPADM

## 2018-08-14 RX ORDER — LEVOTHYROXINE SODIUM 0.03 MG/1
25 TABLET ORAL
Status: DISCONTINUED | OUTPATIENT
Start: 2018-08-14 | End: 2018-08-20 | Stop reason: HOSPADM

## 2018-08-14 RX ORDER — SODIUM CHLORIDE 450 MG/100ML
100 INJECTION, SOLUTION INTRAVENOUS CONTINUOUS
Status: DISCONTINUED | OUTPATIENT
Start: 2018-08-14 | End: 2018-08-16

## 2018-08-14 RX ORDER — POTASSIUM CHLORIDE 20MEQ/15ML
20 LIQUID (ML) ORAL ONCE
Status: COMPLETED | OUTPATIENT
Start: 2018-08-14 | End: 2018-08-14

## 2018-08-14 RX ADMIN — SODIUM CHLORIDE 100 ML/HR: 0.45 INJECTION, SOLUTION INTRAVENOUS at 08:42

## 2018-08-14 RX ADMIN — CHLORHEXIDINE GLUCONATE 15 ML: 1.2 RINSE ORAL at 09:32

## 2018-08-14 RX ADMIN — PROPOFOL 45 MCG/KG/MIN: 10 INJECTION, EMULSION INTRAVENOUS at 17:17

## 2018-08-14 RX ADMIN — PROPOFOL 50 MCG/KG/MIN: 10 INJECTION, EMULSION INTRAVENOUS at 00:00

## 2018-08-14 RX ADMIN — ENOXAPARIN SODIUM 40 MG: 40 INJECTION SUBCUTANEOUS at 09:32

## 2018-08-14 RX ADMIN — POTASSIUM CHLORIDE 20 MEQ: 20 SOLUTION ORAL at 10:17

## 2018-08-14 RX ADMIN — CHLORHEXIDINE GLUCONATE 15 ML: 1.2 RINSE ORAL at 20:33

## 2018-08-14 RX ADMIN — PROPOFOL 50 MCG/KG/MIN: 10 INJECTION, EMULSION INTRAVENOUS at 04:12

## 2018-08-14 RX ADMIN — PROPOFOL 50 MCG/KG/MIN: 10 INJECTION, EMULSION INTRAVENOUS at 08:40

## 2018-08-14 RX ADMIN — SODIUM CHLORIDE 200 ML/HR: 0.45 INJECTION, SOLUTION INTRAVENOUS at 19:40

## 2018-08-14 RX ADMIN — ACETAMINOPHEN 975 MG: 160 SUSPENSION ORAL at 00:00

## 2018-08-14 RX ADMIN — SODIUM CHLORIDE, SODIUM LACTATE, POTASSIUM CHLORIDE, AND CALCIUM CHLORIDE 1000 ML: .6; .31; .03; .02 INJECTION, SOLUTION INTRAVENOUS at 21:22

## 2018-08-14 RX ADMIN — LEVOTHYROXINE SODIUM 25 MCG: 25 TABLET ORAL at 12:04

## 2018-08-14 RX ADMIN — PROPOFOL 50 MCG/KG/MIN: 10 INJECTION, EMULSION INTRAVENOUS at 12:04

## 2018-08-14 RX ADMIN — DEXMEDETOMIDINE HYDROCHLORIDE 0.1 MCG/KG/HR: 100 INJECTION, SOLUTION INTRAVENOUS at 17:27

## 2018-08-14 RX ADMIN — POTASSIUM CHLORIDE 20 MEQ: 2 INJECTION, SOLUTION, CONCENTRATE INTRAVENOUS at 01:55

## 2018-08-14 RX ADMIN — Medication 20 MG: at 04:00

## 2018-08-14 RX ADMIN — POTASSIUM PHOSPHATE, MONOBASIC AND POTASSIUM PHOSPHATE, DIBASIC 21 MMOL: 224; 236 INJECTION, SOLUTION INTRAVENOUS at 08:41

## 2018-08-14 NOTE — PLAN OF CARE
Problem: SAFETY,RESTRAINT: NV/NON-SELF DESTRUCTIVE BEHAVIOR  Goal: Remains free of harm/injury (restraint for non violent/non self-detsructive behavior)  INTERVENTIONS:  - Instruct patient/family regarding restraint use   - Assess and monitor physiologic and psychological status   - Provide interventions and comfort measures to meet assessed patient needs   - Identify and implement measures to help patient regain control  - Assess readiness for release of restraint   Outcome: Not Progressing    Goal: Returns to optimal restraint-free functioning  INTERVENTIONS:  - Assess the patient's behavior and symptoms that indicate continued need for restraint  - Identify and implement measures to help patient regain control  - Assess readiness for release of restraint   Outcome: Progressing

## 2018-08-14 NOTE — NUTRITION
If unable to safely advance PO diet in next 24 hrs then consider an alternate means of nutrition support and reconsult RD for recs  Consider Low Cholesterol, non-ulcerogenic diet when safely advanced to solids

## 2018-08-14 NOTE — RESPIRATORY THERAPY NOTE
RT Ventilator Management Note  Carissa Bauer 62 y o  female MRN: [id number]  Unit/Bed#: [facility] 01 Encounter: [id number]      Daily Screen       8/13/2018 1045 8/14/2018 0723          Patient safety screen outcome[de-identified] Failed Failed      Not Ready for Weaning due to[de-identified] Underline problem not resolved Underline problem not resolved              Physical Exam:   Assessment Type: (P) Assess only  General Appearance: (P) Sedated  Respiratory Pattern: (P) Assisted  Chest Assessment: (P) Chest expansion symmetrical  Bilateral Breath Sounds: (P) Clear, Diminished  O2 Device: vent      Resp Comments: (P) PT is resting on ac setting no SBT performed underlining problem not resolved RR decreased to 12  will continue to monitor

## 2018-08-14 NOTE — CASE MANAGEMENT
Initial Clinical Review    Admission: Date/Time/Statement: 8/13/18 @ [phone]     Orders Placed This Encounter   Procedures    Inpatient Admission (expected length of stay for this patient is greater than two midnights)     Standing Status:   Standing     Number of Occurrences:   1     Order Specific Question:   Admitting Physician     Answer:   Ildefonso Garnica [[id number]]     Order Specific Question:   Level of Care     Answer:   Critical Care [15]     Order Specific Question:   Estimated length of stay     Answer:   More than 2 Midnights     Order Specific Question:   Certification     Answer:   I certify that inpatient services are medically necessary for this patient for a duration of greater than two midnights  See H&P and MD Progress Notes for additional information about the patient's course of treatment  ED: Date/Time/Mode of Arrival:   ED Arrival Information     Expected Arrival Acuity Means of Arrival Escorted By Service Admission Type    - 8/13/2018 06:51 Emergent Ambulance [facility] EMS Critical Care/ICU Emergency    Arrival Complaint    overdose        Chief Complaint:   Chief Complaint   Patient presents with    Altered Mental Status     per EMS, patients mother states that she has been increasing confused over past day  Mother also told EMS pt took 240 mg geodon PO     History of Illness: Gladis Baca is a 62 y o  female who presents to [facility] ED from home s/p EMS summon by mother for changes in mental status  The patient discussed with her mother this morning that she had taken extra doses of her Geodon  EMS arrived at the house and according to the history by a emergency department staff and corroborated by Toxicology Service, the only persons available at the time to get a history from, the patient had taken 3 extra geodon"  The family present at the time of emergency department evaluations stated that the patient also is on Seroquel and Paxil    There is suspicion that the patient may have taken additional doses of all of her medications, as multiple recent history transcriptions have shown that the patient attempted to contact PCP as an outpatient for treatment of back pain  This is the reason for suspicion that the patient may have been taking increases of all of her medications  At the home EMS found the patient significantly altered in appearance, non cohesive, had diaphoretic and tachycardic  In the emergency department the patient had a temperature of 100 8°, was tachycardic, additionally the patient had a witnessed seizure in the emergency department  She was sedated and  intubated and placed on propofol  She still was having active seizure activity in the emergency department requiring increased doses of propofol and treatment with 2 doses of 20 mg of diazepam   In the intensive care unit the patient was sedated and ventilated on 50 micrograms per kg per/minute of propofol  Epileptologist E evaluation was consulted for  Patient was admitted hyponatremic with a serum sodium of 112 that improved to 115 post a 150 mL bolus of 3% saline, an elevated CK of 5000, and a lactic acid of 8 1    Also, on my examination in the emergency department the patient had large amounts of coffee ground material in her NG tube which was to suction      ED Vital Signs:   ED Triage Vitals [08/13/18 0653]   Temperature Pulse Respirations Blood Pressure SpO2   97 9 °F (36 6 °C) (!) 111 (!) 30 (!) 173/95 98 %      Temp Source Heart Rate Source Patient Position - Orthostatic VS BP Location FiO2 (%)   Oral Monitor Sitting Right arm --      Pain Score       No Pain        Wt Readings from Last 1 Encounters:   08/13/18 70 6 kg (155 lb 10 3 oz)     Vital Signs (abnormal):   08/13/18 2200  100 4 °F (38 °C)  98  16   174/100  135  100 %  --    08/13/18 2100   100 8 °F (38 2 °C)  98  15  111/75  87  100 %  --    08/13/18 2030   100 9 °F (38 3 °C)  96  15  135/81  98  100 %  --    08/13/18 2015   101 1 °F (38 4 °C)  96  15  139/87  110  100 %  --    08/13/18 2000   101 1 °F (38 4 °C)  96  12  126/82  100  100 %  --    08/13/18 1915   101 1 °F (38 4 °C)  96  15  133/81  98  100 %  --    08/13/18 1900   101 1 °F (38 4 °C)  96  15  131/77  95  100 %  --    08/13/18 1800   101 1 °F (38 4 °C)  96  16  136/86  111  100 %  --    08/13/18 1700   100 8 °F (38 2 °C)  96  16  164/98  123  100 %  --    08/13/18 1600  100 4 °F (38 °C)   108  17   163/107  129  100 %  --    08/13/18 1300  100 °F (37 8 °C)  98  16   163/105  123  100 %  --    08/13/18 1200  99 3 °F (37 4 °C)  98  16   175/110  138  100 %  --    08/13/18 1100  98 6 °F (37 °C)  100  16   187/113  140  100 %  --    08/13/18 1052  --   107  22  --  --  99 %  --    08/13/18 0920  --   107  22  --  --  99 %  --    08/13/18 0915  99 3 °F (37 4 °C)   110  22   170/113  --  99 %  None (Room air)    08/13/18 0845  100 4 °F (38 °C)   140   45   156/106  --  98 %  None (Room air)    08/13/18 0830  100 4 °F (38 °C)   144   28   153/104  --  98 %  None (Room air)    08/13/18 0815  99 7 °F (37 6 °C)   142   31  --  --  92 %  None (Room air)    08/13/18 0653  97 9 °F (36 6 °C)   111   30   173/95  --  98 %  --      Abnormal Labs:  VENTILATOR  08/14/18 0522   pH, Arterial 7 556     pCO2, Arterial 26 8     pO2, Arterial 197 2     HCO3, Arterial 23 2    Base Excess, Arterial 2 1    O2 Content, Arterial 18 9    O2 HGB,Arterial  98 4     SOURCE Radial, Right       8/13 8/14   Sodium 115    117    120    122 125   Potassium 3 8 2 7 3 2 3 7 3 8   Chloride 80 78 84 91 93   CO2 18 24 25 22 24   Anion Gap 17 15 11 9 8   Calcium 7 5 8 4 8 2 7 6 7 9   Phosphorus     1 6     CK MB fraction 140 3, 61 7 on 8/14  Total CK 08414, 50730   Lactic acid 8 1, 2 3   Serum osmo 237  Hct 34 7   UDS negative    08/13/18 0852   Color, UA Yellow    Clarity, UA Clear    pH, UA 5 5    Leukocytes, UA Negative    Nitrite, UA Negative    Protein, UA 30 (1+)     Glucose, UA Negative    Ketones, UA 15 (1+) Urobilinogen, UA 0 2    Bilirubin, UA Negative    Blood, UA Moderate     Specific [place], UA 1 025      Diagnostic Test Results:     8/13 EKG -    Sinus tachycardia  Biatrial enlargement  Nonspecific T wave abnormality  Abnormal ECG  When compared with ECG of 13-AUG-2018 07:07,  No significant change was found    8/13 CXR - Right perihilar and left retrocardiac atelectasis  8/14 CXR - Minimal subsegmental atelectasis right perihilar region      ED Treatment:   Medication Administration from 08/13/2018 0651 to 08/13/2018 1036    Date/Time Order Dose Route Action   08/13/2018 0704 sodium chloride 0 9 % bolus 1,000 mL 1,000 mL Intravenous New Bag   08/13/2018 0731 sodium chloride 0 9 % bolus 500 mL 500 mL Intravenous New Bag   08/13/2018 0745 sodium chloride (HYPERTONIC) 3 % bolus 150 mL 150 mL Intravenous New Bag   08/13/2018 0740 LORazepam (ATIVAN) 2 mg/mL injection 2 mg 2 mg Intravenous Given by Other   08/13/2018 0825 sodium chloride 0 9 % bolus 500 mL 500 mL Intravenous New Bag   08/13/2018 0825 LORazepam (ATIVAN) 2 mg/mL injection 2 mg 2 mg Intravenous Given   08/13/2018 0843 diazepam (VALIUM) injection 20 mg 20 mg Intravenous Given   08/13/2018 0856 diazepam (VALIUM) injection 20 mg 20 mg Intravenous Given   08/13/2018 0909 etomidate (AMIDATE) 2 mg/mL injection 20 mg 20 mg Intravenous Given   08/13/2018 0909 succinylcholine (ANECTINE) 20 mg/mL injection 100 mg 100 mg Intravenous Given   08/13/2018 0954 propofol (DIPRIVAN) 1000 mg in 100 mL infusion (premix) 50 mcg/kg/min Intravenous Rate/Dose Change   08/13/2018 0915 propofol (DIPRIVAN) 1000 mg in 100 mL infusion (premix) 20 mcg/kg/min Intravenous New Bag        Past Medical/Surgical History:    Active Ambulatory Problems     Diagnosis Date Noted    Hyponatremia 09/25/2016    Major depressive disorder 09/26/2016    Lumbar radiculopathy 03/19/2018    DDD (degenerative disc disease), lumbar 03/19/2018    Spondylolisthesis at L4-L5 level 03/19/2018    Localized osteoporosis without current pathological fracture 05/25/2018    Cough 05/25/2018    Spinal stenosis of lumbar region with neurogenic claudication 06/15/2018    Lumbar spondylosis 06/15/2018    T12 compression fracture (HCC) 06/15/2018    Synovial cyst of lumbar facet joint 06/15/2018    Anxiety 10/31/2016    Bulimia nervosa 03/19/2014    Constipation 04/10/2014    Esophageal reflux 10/04/2011    Hyperlipidemia 10/31/2016    Hypothyroidism 10/31/2016    Schizoaffective disorder, depressive type ([facility] [address] ) 11/04/2014    Other fatigue 06/19/2018     Resolved Ambulatory Problems     Diagnosis Date Noted    Pneumonia 09/25/2016     Past Medical History:   Diagnosis Date    Anxiety     Back pain at L4-L5 level     Schreiber esophagus     Bulimia     Depression     Disease of thyroid gland     GERD (gastroesophageal reflux disease)     Hyperlipidemia     Hypothyroidism     Leukopenia     Synovial cyst of lumbar spine     Vertigo     Weight gain      Admitting Diagnosis: Clonus [R25 8]  Hyponatremia [E87 1]  Overdose [T50 901A]  Seizure ([facility] [address] ) [R56 9]  Overdose of antipsychotic [T43 501A]    Age/Sex: 62 y o  female    Assessment/Plan:   Acute hypoxic respiratory failure/ Status epilepicus/ Serotonin syndrome/ lactic acidosis/ rhabdomyolysis/ suicidal attempt  Neuro: propfol and prn bnezo, continue EEG for at least 24 hours, hold on cyproheptadine per toxicology  CV; agree with NSS at 50 cc hour may need redose lasix in AM, prn lopressor 5 IV Q6 to keep 140-160  Lungs Continue AC while on propofol  GI: PPI BI and hold TF today  : trend Na q4 increasing after bolus of 3* and now on NSS prn lasix, replace electrolytes, trend cpk and lactate  ID: no evidence of infection  Heme: continue lovenox  Endocrine: TSH normal, continue outpt levothyroxine  Goals; propofol, trend Na     Admission Orders:  Scheduled Meds:   Current Facility-Administered Medications:  acetaminophen 650 mg Oral Q4H PRN chlorhexidine 15 mL Swish & Spit Q12H [address]    diazepam 20 mg Intravenous Q4H PRN    enoxaparin 40 mg Subcutaneous Daily    metoprolol 5 mg Intravenous Q6H PRN    potassium chloride 20 mEq Oral Once    potassium phosphate 21 mmol Intravenous Once Last Rate: 21 mmol (08/14/18 0841)   propofol 5-50 mcg/kg/min Intravenous Titrated Last Rate: 50 mcg/kg/min (08/14/18 0840)   sodium chloride 100 mL/hr Intravenous Continuous Last Rate: 100 mL/hr (08/14/18 0842)     Continuous Infusions:   propofol 5-50 mcg/kg/min Last Rate: 50 mcg/kg/min (08/14/18 0840)   sodium chloride 100 mL/hr Last Rate: 100 mL/hr (08/14/18 0842)     PRN Meds:   Acetaminophen x1    Diazepam x3 since admission     metoprolol    Critical Care MICU  Mechanical ventilator with wean/extubation protocol   EEG video monitoring 24 hr   OGT   SCDs  Neuro checks q 1 hr   BMP q 4 hr   Diet NPO   Early mobilization   Cons Toxicology   Cons CM   __________________ 8/13 Toxicology Consult   1) Severe serotonin syndrome  2) Hyponatremia  3) Altered mental status with delirium  4) Seizure  5) Metabolic acidosis with elevated anion gap (mild)  6) Acute ventilatory dependent respiratory failure  7) Tachycardia  8) Hyperthermia  9) Rhabdomyolysis    Expect continuation of sever toxicity for at least 24 hrs  ______________________ 8/14 Critical Care Progress Note  62 female admitted for status epilepticus and hyponatremia presumed to be serotonin syndrome post presumptive ingestion of Paxil  Intubated and sedated in emergency department for tonic presumed-activity for seizure  Placed on continuous EEG monitoring without current seizure activity  Overnight events:  Patient was febrile to 101 1  Treated with Tylenol  Serum sodium over corrected to 124 this morning    CK peaked at 14,600, now trended down   ___________________ 8/14  Toxicology Progress Note  If attempting to extubate watch carefully or wait until tomorrow     Thank you,  [address] [facility]  Network Utilization Review Department  Phone: [phone]; Fax [phone]  ATTENTION: The Network Utilization Review Department is now centralized for our 9 Facilities  Make a note that we have a new phone and fax numbers for our Department  Please call with any questions or concerns to [phone] and carefully follow the prompts so that you are directed to the right person  All voicemails are confidential  Fax any determinations, approvals, denials, and requests for initial or continue stay review clinical to [phone]  Due to HIGH CALL volume, it would be easier if you could please send faxed requests to expedite your requests and in part, help us provide discharge notifications faster

## 2018-08-14 NOTE — RESPIRATORY THERAPY NOTE
RT Ventilator Management Note  Oc Raymond 62 y o  female MRN: [id number]  Unit/Bed#: [facility] 01 Encounter: [id number]      Daily Screen       8/13/2018 1045             Patient safety screen outcome[de-identified] Failed    Not Ready for Weaning due to[de-identified] Underline problem not resolved            Physical Exam:   Assessment Type: Assess only  Respiratory Pattern: Assisted  Chest Assessment: Chest expansion symmetrical  Bilateral Breath Sounds: Clear  O2 Device: vent      Resp Comments: pt continued on AC/VC mode/settings all evening/night  No issues while on vent   ABG drawn in am

## 2018-08-14 NOTE — PROGRESS NOTES
08/14/18 [address]   Plan of Care   Comments Attempted visit  PT with doctor     Assessment Completed by: Unit visit

## 2018-08-14 NOTE — PROGRESS NOTES
Progress Note - Critical Care   Martita Rodas 62 y o  female MRN: [id number]  Unit/Bed#: [facility] 01 Encounter: [id number]    Impression:  Principal Problem:    Status epilepticus ([address] )  Active Problems:    Hyponatremia    Serotonin syndrome    Delirium    Acute respiratory failure (HCC)    Metabolic acidosis, increased anion gap    Hyperthermia    Tachycardia    Non-traumatic rhabdomyolysis    Hypophosphatemia      Assessment / Plan :  62 female admitted for status epilepticus and hyponatremia presumed to be serotonin syndrome post presumptive ingestion of Paxil  Intubated and sedated in emergency department for tonic presumed-activity for seizure  Placed on continuous EEG monitoring without current seizure activity  Overnight events:  Patient was febrile to 101 1  Treated with Tylenol  Serum sodium over corrected to 124 this morning  CK peaked at 14,600, now trended down  Neuro:  Status epilepticus present on admission:  Currently controlled and not having overt seizure activity  Presumed secondary to serotonin syndrome and hyponatremia  Per discussion with Toxicology we can start waking patient up today  -CAM ICU non determinable:  Delirium Precautions  -Pain /Sedation:  Propofol / Sedation day 2  / SAT today   -Neuro Checks:  Critical  CV/Heme/VTE PPx:  HTN:  P r n  Lopressor with goal blood pressure 141/60  -Hemoglobin :  Stable /  Platelets:  Stable  / Coagulation:  Stable  - Chemical VTE PPX :  Lovenox  / SCDs  Pulm:  Acute hypoxic respiratory failure secondary to both seizure activity and induced for seizure control:  Maintain mechanical ventilation through sedation for airway protection, can attempt awakening trial in breathing trial today  -I/S v   Flutter Valve / Pulm Toilet  - ETT day 2  -VAP PPx:  CHG/Pulm toilet/HOB >30/secretion management  :  in line suction  -SBT today for vent liberation assessment  GI/Endo:    Hypothyroidism:  Continue Synthroid  Coffee-ground emesis:  Was gastroccult negative yesterday, no evidence of GI bleeding, prophylactic Protonix only  -Glycemic Control:  Accuchecks :  SSI Alg  If  needed   -Bowel Reg:  Senna  / PRN Laxatives if required  -Stress Ulcer PPx :  Protonix  -Nutrition:  NPO, can start to feed if not extubated will today   /FEN:  Hypokalemia:  Repleted with potassium yesterday, adequate response, no longer hypokalemia  Hypophosphatemia:  Replete with 21 millimole of phosphorus today  Hyponatremia normal volemic:  Likely SSRI induced, indeterminate chronic versus acute, correcting with diuresis > slightly overcorrected this morning, providing half normal saline to recover  Goal was 120 for this morning, goal for 130 tomorrow morning  -IVF :  Net Bal:  -4 5 / Goal :  Even to slightly positive up to 1 L  -Electrolytes:  goal K/M/PH :  4, 2, 3 :  Replete as necessary   -Bernardo:  Yes /  day 2  ID:  Fever:  Likely secondary to metabolic activity secondary to seizure process:  Treat with Tylenol, add CBC with differential is morning  -Trend Fever and WBC curve  -Current role for abx:   None current /  Abx Day 0  /  -Cultures:  Blood cultures initiated in emergency department, pending and negative to date  MSK/Mobility/ PT OT/ Lines:  Rhabdomyolysis:  Seizure induced, continue to trend serial CKs  -MSK:  Routine Turning, Offloading, Skin care  -OOB / Ambulatory  as early as possible  -PTOT / CM and Rehab Assessment   -Access:  PIV:  Yes /  Central Access:  No /  day 0  /  arterial catheter:  Day none  Consultants:  Epileptologist, toxicology  Dispo/Family:   ICU care / Patient and Family Updated: Mother updated by phone yesterday  Treatment Team/Consultants:  Critical Care Medicine    Date of admission: 8/13/2018    Reason for Admission:  Status epilepticus      Physical Exam:    General Appearance:  Intubated, sedated, on mechanical ventilation, no acute distress  HENT:  Endotracheal tube in place, normocephalic atraumatic      Neck:  Supple, no deviation  No CVC  Eyes:  PERRLA at 2, no icterus  Cardiac:  Tachycardic rate and normal sinus rhythm, no murmur, no rub  Pulmonary:  Clear to auscultation bilaterally, no secretions  Gastrointestinal:  Soft, nontender,, no distention,  positive bowel sounds  : Bernardo present: yes , draining clear yellow urine            Musculoskeletal: No edema bilaterally  Neuro:  GCS 1, 1, 1:  3 T  Psych: Mood and affect non appreciable  Skin:  Intact  Vitals:   Vitals:    18 0315 18 0400 18 0500 18 0600   BP:  150/90 109/77 121/82   Pulse:  100 88 90   Resp:  19 15 12   Temp:  99 °F (37 2 °C) 99 °F (37 2 °C) 99 °F (37 2 °C)   TempSrc:  Probe  Probe   SpO2: 100% 100% 100% 100%   Weight:       Height:                 Temperature: Temp (24hrs), Av 1 °F (37 8 °C), Min:98 6 °F (37 °C), Max:101 1 °F (38 4 °C)  Current: Temperature: 99 °F (37 2 °C)    Weights: IBW: 52 4 kg  Body mass index is 27 57 kg/m²  Respiratory:  SpO2: SpO2: 100 %, SpO2 Activity:   a/c/VC:  16/450/50/5:  ABG 7 56/26/197/23/2 1 :  Decreased respiratory rate to 12:  Patient was non over breathing the vent    Intake and Outputs:    Intake/Output Summary (Last 24 hours) at 18 0724  Last data filed at 18 0600   Gross per 24 hour   Intake          1848  75 ml   Output             6500 ml   Net         -4651 25 ml     I/O last 24 hours: In: 1848 8 [I V :1718 8; NG/GT:130]  Out: 6500 [Urine:5400; Emesis/NG output:1100]    BM:  over last 24 hours    Nutrition:        Diet Orders            Start     Ordered    18 0950  Diet NPO  Diet effective now     Question Answer Comment   Diet Type NPO    RD to adjust diet per protocol?  Yes        18 0955          Labs:     Results from last 7 days  Lab Units 18  1707 18  0842 18  0718   WBC Thousand/uL  --   --  9 96   HEMOGLOBIN g/dL 14 0  --  13 5   I STAT HEMOGLOBIN g/dl  --  13 3  --    HEMATOCRIT %  --   --  36 5 PLATELETS Thousands/uL  --   --  355   NEUTROS PCT %  --   --  87*   MONOS PCT %  --   --  5       Results from last 7 days  Lab Units 18  0438 18  2350 18  1707  18  0703   SODIUM mmol/L 125* 122* 120*  < > 112*   POTASSIUM mmol/L 3 8 3 7 3 2*  < > 3 7   CHLORIDE mmol/L 93* 91* 84*  < > 75*   CO2 mmol/L 24 22 25  < > 21   BUN mg/dL 7 7 13  < > 14   CREATININE mg/dL 0 63 0 64 0 71  < > 0 85   CALCIUM mg/dL 7 9* 7 6* 8 2*  < > 8 7   TOTAL PROTEIN g/dL  --   --   --   --  7 4   BILIRUBIN TOTAL mg/dL  --   --   --   --  1 09*   ALK PHOS U/L  --   --   --   --  75   ALT U/L  --   --   --   --  34   AST U/L  --   --   --   --  120*   GLUCOSE RANDOM mg/dL 109 91 100  < > 109   GLUCOSE, ISTAT   --   --   --   < >  --    < > = values in this interval not displayed  Results from last 7 days  Lab Units 18  0438   MAGNESIUM mg/dL 2 0       Results from last 7 days  Lab Units 18  0438   PHOSPHORUS mg/dL 1 6*        Results from last 7 days  Lab Units 18  0718   INR  1 00   PTT seconds 27       Results from last 7 days  Lab Units 18  1823 18  0829 18  0703   LACTIC ACID mmol/L 2 3* 8 1* 3 9*       Results from last 7 days  Lab Units 18  0703   TROPONIN I ng/mL <0 02       Results from last 7 days  Lab Units 18  0522   PH ART  7 556*   PCO2 ART mm Hg 26 8*   PO2 ART mm Hg 197 2*   HCO3 ART mmol/L 23 2   BASE EXC ART mmol/L 2 1   ABG SOURCE  Radial, Right           Results from last 7 days  Lab Units 18  0703   TSH 3RD GENERATON uIU/mL 3 280       Imagin18 CXR:  No acute disease with I have personally reviewed pertinent films in PACS    Micro:   Blood Culture: No results found for: BLOODCX  Urine Culture:   Lab Results   Component Value Date    URINECX 70,000-79,000 cfu/ml Mixed Contaminants X5 2016     Sputum Culture: No components found for: SPUTUMCX  Wound Culure: No results found for: WOUNDCULT        Allergies:    Allergies Allergen Reactions    Latex     Risperdal [Risperidone]     Zyprexa [Olanzapine]        Medications:   Scheduled Meds:    Current Facility-Administered Medications:  acetaminophen 650 mg Oral Q4H PRN Mayra Prom, PA-C    acetaminophen 975 mg Oral Q8H Marcos DO Alex    chlorhexidine 15 mL Swish & Spit Q12H [facility], [facility]    diazepam 20 mg Intravenous Q4H PRN Mayra Prom, PA-C    enoxaparin 40 mg Subcutaneous Daily Mayra Prom, PA-C    metoprolol 5 mg Intravenous Q6H PRN Mayra Prom, PA-C    potassium chloride 20 mEq Oral Once Mayra Prom, PA-C    potassium phosphate 21 mmol Intravenous Once Mayra Prom, PA-C    propofol 5-50 mcg/kg/min Intravenous Titrated Mayra Prom, PA-C Last Rate: 50 mcg/kg/min (08/14/18 0412)   sodium chloride 100 mL/hr Intravenous Continuous Mayra Prom, PA-C      Continuous Infusions:    propofol 5-50 mcg/kg/min Last Rate: 50 mcg/kg/min (08/14/18 0412)   sodium chloride 100 mL/hr      PRN Meds:    acetaminophen 650 mg Q4H PRN   diazepam 20 mg Q4H PRN   metoprolol 5 mg Q6H PRN       Invasive lines and devices: Invasive Devices     Peripheral Intravenous Line            Peripheral IV 08/13/18 Left Antecubital 1 day    Peripheral IV 08/13/18 Right Forearm 1 day          Drain            NG/OG/Enteral Tube Orogastric 18 Fr Right mouth less than 1 day    Urethral Catheter Temperature probe 16 Fr  less than 1 day          Airway            ETT  Hi-Lo 7 5 mm less than 1 day                Code Status: Level 1 - Full Code    Counseling / Coordination of Care  Total time spent today 41 minutes  Greater than 50% of total time was spent with the patient and / or family counseling and / or coordination of care   A description of the counseling / coordination of care: Exam/data/plan/coordination      SIGNATURE: Mayra Chicas PA-C  DATE: August 14, 2018  TIME: 7:24 AM

## 2018-08-14 NOTE — PROGRESS NOTES
Progress Note - Medical Toxicology  Brenda Renae 62 y o  female MRN: [id number]  [facility] En[facility]: [id number]            Reason for current consult: Toxic ingestion    ASSESSMENT:  1) Severe serotonin syndrome  2) Hyponatremia  3) Altered mental status  4) Seizure  5) Metabolic acidosis with elevated anion gap (mild), resolved  6) Acute ventilatory dependent respiratory failure  7) Respiratory alkalosis  8) Tachycardia  9) Hyperthermia  10) Rhabdomyolysis    RECOMMENDATIONS:  This morning the patient is well sedated on propofol gtt, vitals improved from yesterday  Last required benzodiazepines last night around 10:30  As noted yesterday, I would expect at least 24 hours of severe toxicity but potentially up to 48-72 hours from time of presentation, with mild residual toxicity for as long as 4-5 days  It would be reasonable to try weaning sedation this morning, however it is very possible that the patient will begin manifesting toxicity again shortly after sedation is weaned  If there is sedation weaning trial she will need to be watched closely for development of further toxicity and quickly re-sedated if it develops (agitation, autonomic instability)  If preferred it would also be appropriate to keep her sedated through the day today and wait until tomorrow to try weaning sedation  There is no need to continue EMU from toxicology perspective  - Can try weaning sedation this morning if desired, but there is still potential for continued toxicity and patient will need to be monitored closely and re-sedated immediately if any further agitation or autonomic instability  It would also be appropriate to just continue to keep her sedated today and attempt wean tomorrow  - Mainstay of treatment for serotonin syndrome is benzodiazepines, particularly to treat the autonomic instability and the agitation as well  Propofol in an intubated patient is also appropriate    Can continue the propofol with push doses of benzos as needed  Often high doses of benzodiazepines are needed to be effective                  - Seizures and hyperthermia portend poor prognosis if not aggressively treated  If the patient again develops these findings in spite of aggressive sedation, she will need to be paralyzed with a nondepolarizing agent                  -There is no good evidence for benefit of cyproheptadine in severe serotonin syndrome and it also has antimuscarinic properties which could worsen the patient's autonomic findings  Would not recommend giving this                  - There is no role for whole bowel irrigation, gastric lavage, or enhanced elimination  WBI and gastric lavage overall have been shown to cause harm while not having any appreciable benefit                  - There is no need for EEG monitoring at this time  Seizures of toxicological etiology are self limited and there is no clinical/ diagnostic benefit from EEG  There is no need for the patient to be started on antiepileptics after her hospital course                  - Sodium has improved from 112 to 125  Continued assessment/ management per MICU team   I think serotonin syndrome was primary cause of seizures but hyponatremia may have lowered threshold for these to occur                  - Continuous cardiac monitoring                  - Continued medical management for rhabdomyolysis     - Development of respiratory alkalosis overnight  Vent management per MICU team       - Hyperthermia and tachycardia are expected findings in severe serotonin syndrome  I do not suspect sepsis  There is no need to send blood cultures or start antibiotics unless clinical suspicion for infection                  - The patient also takes levothyroxine  In OD it takes 3-7 days to develop signs/ symptoms of thyrotoxicosis and I do not think this is related to the patient's presentation    We will need to monitor over the several days however        Subjective:   Overnight the patient was kept intubated and sedated  TMax 101 1 which improved rapidly with administration of diazepam   Last dose of benzos was at approximately 10:30 last night  This morning vital signs are improved  Unable to obtain further history as patient intubated and sedated  No family or friends present at this time to obtain further history  Objective:     Physical Exam   Constitutional: She appears well-developed and well-nourished  No distress  HENT:   Head: Normocephalic and atraumatic  Mouth/Throat: Oropharynx is clear and moist    ETT in place   Eyes: Conjunctivae are normal    Pupils 4mm, reactive bilaterally   Neck: Neck supple  Cardiovascular: Normal rate, regular rhythm, normal heart sounds and intact distal pulses  Pulmonary/Chest: Breath sounds normal  She has no wheezes  She has no rales  On vent  Not overbreathing vent at time of my evaluation  Abdominal: Soft  Bowel sounds are normal  She exhibits no distension  Musculoskeletal: She exhibits no edema  Normal tone   Neurological:   GCS 3T   2+ bilateral patellar DTR's   2 beat inducible ankle clonus  Skin: Skin is warm and dry  No rash noted  Vitals: Blood pressure 121/82, pulse 90, temperature 99 °F (37 2 °C), temperature source Probe, resp  rate 12, height 5' 3" (1 6 m), weight 70 6 kg (155 lb 10 3 oz), SpO2 100 %  Intake/Output Summary (Last 24 hours) at 08/14/18 0620  Last data filed at 08/14/18 0600   Gross per 24 hour   Intake          1848  75 ml   Output             6400 ml   Net         -4551 25 ml         Invasive Devices     Peripheral Intravenous Line            Peripheral IV 08/13/18 Left Antecubital less than 1 day    Peripheral IV 08/13/18 Right Forearm less than 1 day          Drain            NG/OG/Enteral Tube Orogastric 18 Fr Right mouth less than 1 day    Urethral Catheter Temperature probe 16 Fr  less than 1 day          Airway            ETT  Hi-Lo 7 5 mm less than 1 day                Lab, Imaging and other studies: I have personally reviewed pertinent reports           Minutes of critical care time:  28  -Critical care time was exclusive of seperately bilable procedures and treating other patients as well as teaching time    -Critical care was necessary to treat or prevent imminent or life-threatening deterioration of the following condition: toxidrome (serotonin syndrome)  -Critical care time was spent personally by me on the following activities as well as the above as per the hospital course and rest of chart: developement of a treatment plan, discussions with primary provider(s), evaluation of patient's response to the treatment, examination of the patient, recommending treatements and interventions, re-evaluation of the patient's condition, recommending/reviewing laboratory studies

## 2018-08-14 NOTE — SOCIAL WORK
CM informed that pt was admitted with bags on her feet with feces in the bag  CM spoke with Mayo Vila at [address] on aging and made a protective referral (report of need)  YASMIN spoke with Mayo Vila at [place] [id number]  CM also made aging protective services report to Mayo Vila for pt mother as the home conditions are unknown and for conflicting reports from mother and care she is able to provide to herself

## 2018-08-15 LAB
ANION GAP SERPL CALCULATED.3IONS-SCNC: 7 MMOL/L (ref 4–13)
ANION GAP SERPL CALCULATED.3IONS-SCNC: 8 MMOL/L (ref 4–13)
ANION GAP SERPL CALCULATED.3IONS-SCNC: 9 MMOL/L (ref 4–13)
BASOPHILS # BLD AUTO: 0.02 THOUSANDS/ΜL (ref 0–0.1)
BASOPHILS NFR BLD AUTO: 1 % (ref 0–1)
BUN SERPL-MCNC: 3 MG/DL (ref 5–25)
BUN SERPL-MCNC: 4 MG/DL (ref 5–25)
BUN SERPL-MCNC: 4 MG/DL (ref 5–25)
CALCIUM SERPL-MCNC: 7.5 MG/DL (ref 8.3–10.1)
CALCIUM SERPL-MCNC: 7.8 MG/DL (ref 8.3–10.1)
CALCIUM SERPL-MCNC: 7.9 MG/DL (ref 8.3–10.1)
CALCIUM SERPL-MCNC: 8 MG/DL (ref 8.3–10.1)
CALCIUM SERPL-MCNC: 8 MG/DL (ref 8.3–10.1)
CHLORIDE SERPL-SCNC: 95 MMOL/L (ref 100–108)
CHLORIDE SERPL-SCNC: 96 MMOL/L (ref 100–108)
CHLORIDE SERPL-SCNC: 97 MMOL/L (ref 100–108)
CHLORIDE SERPL-SCNC: 97 MMOL/L (ref 100–108)
CHLORIDE SERPL-SCNC: 99 MMOL/L (ref 100–108)
CO2 SERPL-SCNC: 22 MMOL/L (ref 21–32)
CO2 SERPL-SCNC: 22 MMOL/L (ref 21–32)
CO2 SERPL-SCNC: 24 MMOL/L (ref 21–32)
CO2 SERPL-SCNC: 25 MMOL/L (ref 21–32)
CO2 SERPL-SCNC: 26 MMOL/L (ref 21–32)
CREAT SERPL-MCNC: 0.41 MG/DL (ref 0.6–1.3)
CREAT SERPL-MCNC: 0.42 MG/DL (ref 0.6–1.3)
CREAT SERPL-MCNC: 0.5 MG/DL (ref 0.6–1.3)
CREAT SERPL-MCNC: 0.55 MG/DL (ref 0.6–1.3)
CREAT SERPL-MCNC: 0.57 MG/DL (ref 0.6–1.3)
EOSINOPHIL # BLD AUTO: 0.05 THOUSAND/ΜL (ref 0–0.61)
EOSINOPHIL NFR BLD AUTO: 1 % (ref 0–6)
ERYTHROCYTE [DISTWIDTH] IN BLOOD BY AUTOMATED COUNT: 13.7 % (ref 11.6–15.1)
GFR SERPL CREATININE-BSD FRML MDRD: 103 ML/MIN/1.73SQ M
GFR SERPL CREATININE-BSD FRML MDRD: 104 ML/MIN/1.73SQ M
GFR SERPL CREATININE-BSD FRML MDRD: 108 ML/MIN/1.73SQ M
GFR SERPL CREATININE-BSD FRML MDRD: 114 ML/MIN/1.73SQ M
GFR SERPL CREATININE-BSD FRML MDRD: 115 ML/MIN/1.73SQ M
GLUCOSE SERPL-MCNC: 101 MG/DL (ref 65–140)
GLUCOSE SERPL-MCNC: 103 MG/DL (ref 65–140)
GLUCOSE SERPL-MCNC: 106 MG/DL (ref 65–140)
GLUCOSE SERPL-MCNC: 108 MG/DL (ref 65–140)
GLUCOSE SERPL-MCNC: 111 MG/DL (ref 65–140)
GLUCOSE SERPL-MCNC: 112 MG/DL (ref 65–140)
GLUCOSE SERPL-MCNC: 86 MG/DL (ref 65–140)
GLUCOSE SERPL-MCNC: 89 MG/DL (ref 65–140)
GLUCOSE SERPL-MCNC: 98 MG/DL (ref 65–140)
HCT VFR BLD AUTO: 30.5 % (ref 34.8–46.1)
HGB BLD-MCNC: 10.2 G/DL (ref 11.5–15.4)
IMM GRANULOCYTES # BLD AUTO: 0.03 THOUSAND/UL (ref 0–0.2)
IMM GRANULOCYTES NFR BLD AUTO: 1 % (ref 0–2)
LYMPHOCYTES # BLD AUTO: 0.4 THOUSANDS/ΜL (ref 0.6–4.47)
LYMPHOCYTES NFR BLD AUTO: 10 % (ref 14–44)
MAGNESIUM SERPL-MCNC: 2.2 MG/DL (ref 1.6–2.6)
MCH RBC QN AUTO: 29.6 PG (ref 26.8–34.3)
MCHC RBC AUTO-ENTMCNC: 33.4 G/DL (ref 31.4–37.4)
MCV RBC AUTO: 88 FL (ref 82–98)
MONOCYTES # BLD AUTO: 0.5 THOUSAND/ΜL (ref 0.17–1.22)
MONOCYTES NFR BLD AUTO: 12 % (ref 4–12)
NEUTROPHILS # BLD AUTO: 3.14 THOUSANDS/ΜL (ref 1.85–7.62)
NEUTS SEG NFR BLD AUTO: 75 % (ref 43–75)
NRBC BLD AUTO-RTO: 0 /100 WBCS
PHOSPHATE SERPL-MCNC: 2 MG/DL (ref 2.7–4.5)
PLATELET # BLD AUTO: 156 THOUSANDS/UL (ref 149–390)
PMV BLD AUTO: 9.4 FL (ref 8.9–12.7)
POTASSIUM SERPL-SCNC: 3.6 MMOL/L (ref 3.5–5.3)
POTASSIUM SERPL-SCNC: 3.7 MMOL/L (ref 3.5–5.3)
POTASSIUM SERPL-SCNC: 3.8 MMOL/L (ref 3.5–5.3)
POTASSIUM SERPL-SCNC: 3.9 MMOL/L (ref 3.5–5.3)
POTASSIUM SERPL-SCNC: 4 MMOL/L (ref 3.5–5.3)
RBC # BLD AUTO: 3.45 MILLION/UL (ref 3.81–5.12)
SODIUM SERPL-SCNC: 127 MMOL/L (ref 136–145)
SODIUM SERPL-SCNC: 127 MMOL/L (ref 136–145)
SODIUM SERPL-SCNC: 128 MMOL/L (ref 136–145)
SODIUM SERPL-SCNC: 129 MMOL/L (ref 136–145)
SODIUM SERPL-SCNC: 130 MMOL/L (ref 136–145)
WBC # BLD AUTO: 4.14 THOUSAND/UL (ref 4.31–10.16)

## 2018-08-15 PROCEDURE — 99233 SBSQ HOSP IP/OBS HIGH 50: CPT | Performed by: INTERNAL MEDICINE

## 2018-08-15 PROCEDURE — 94760 N-INVAS EAR/PLS OXIMETRY 1: CPT

## 2018-08-15 PROCEDURE — 82948 REAGENT STRIP/BLOOD GLUCOSE: CPT

## 2018-08-15 PROCEDURE — 80048 BASIC METABOLIC PNL TOTAL CA: CPT | Performed by: INTERNAL MEDICINE

## 2018-08-15 PROCEDURE — 80048 BASIC METABOLIC PNL TOTAL CA: CPT | Performed by: PHYSICIAN ASSISTANT

## 2018-08-15 PROCEDURE — 85025 COMPLETE CBC W/AUTO DIFF WBC: CPT | Performed by: PHYSICIAN ASSISTANT

## 2018-08-15 PROCEDURE — 99232 SBSQ HOSP IP/OBS MODERATE 35: CPT | Performed by: EMERGENCY MEDICINE

## 2018-08-15 PROCEDURE — 94003 VENT MGMT INPAT SUBQ DAY: CPT

## 2018-08-15 PROCEDURE — 84100 ASSAY OF PHOSPHORUS: CPT | Performed by: PHYSICIAN ASSISTANT

## 2018-08-15 PROCEDURE — 83735 ASSAY OF MAGNESIUM: CPT | Performed by: PHYSICIAN ASSISTANT

## 2018-08-15 PROCEDURE — C9113 INJ PANTOPRAZOLE SODIUM, VIA: HCPCS | Performed by: PHYSICIAN ASSISTANT

## 2018-08-15 RX ORDER — AMOXICILLIN 250 MG
1 CAPSULE ORAL
Status: DISCONTINUED | OUTPATIENT
Start: 2018-08-15 | End: 2018-08-20 | Stop reason: HOSPADM

## 2018-08-15 RX ORDER — DIAZEPAM 5 MG/ML
5 INJECTION, SOLUTION INTRAMUSCULAR; INTRAVENOUS EVERY 4 HOURS PRN
Status: DISCONTINUED | OUTPATIENT
Start: 2018-08-15 | End: 2018-08-20 | Stop reason: HOSPADM

## 2018-08-15 RX ADMIN — PROPOFOL 40 MCG/KG/MIN: 10 INJECTION, EMULSION INTRAVENOUS at 00:49

## 2018-08-15 RX ADMIN — POTASSIUM PHOSPHATE, MONOBASIC AND POTASSIUM PHOSPHATE, DIBASIC 12 MMOL: 224; 236 INJECTION, SOLUTION INTRAVENOUS at 08:13

## 2018-08-15 RX ADMIN — PANTOPRAZOLE SODIUM 40 MG: 40 INJECTION, POWDER, FOR SOLUTION INTRAVENOUS at 06:33

## 2018-08-15 RX ADMIN — SODIUM CHLORIDE 100 ML/HR: 0.45 INJECTION, SOLUTION INTRAVENOUS at 19:28

## 2018-08-15 RX ADMIN — PROPOFOL 30 MCG/KG/MIN: 10 INJECTION, EMULSION INTRAVENOUS at 06:33

## 2018-08-15 RX ADMIN — ENOXAPARIN SODIUM 40 MG: 40 INJECTION SUBCUTANEOUS at 10:00

## 2018-08-15 RX ADMIN — SODIUM CHLORIDE 200 ML/HR: 0.45 INJECTION, SOLUTION INTRAVENOUS at 00:49

## 2018-08-15 RX ADMIN — LEVOTHYROXINE SODIUM 25 MCG: 25 TABLET ORAL at 06:33

## 2018-08-15 RX ADMIN — SODIUM CHLORIDE 200 ML/HR: 0.45 INJECTION, SOLUTION INTRAVENOUS at 06:33

## 2018-08-15 RX ADMIN — CHLORHEXIDINE GLUCONATE 15 ML: 1.2 RINSE ORAL at 21:52

## 2018-08-15 RX ADMIN — Medication 5 MG: at 04:21

## 2018-08-15 RX ADMIN — CHLORHEXIDINE GLUCONATE 15 ML: 1.2 RINSE ORAL at 10:00

## 2018-08-15 NOTE — PROGRESS NOTES
Progress Note - Medical Toxicology  Michelle Wilhelm 62 y o  female MRN: [id number]  [facility]: [id number]            Reason for current consult: Serotonin syndrome    ASSESSMENT:  1) Severe syndrome, improving and now mild  2) Hyponatremia, improving  3) Altered mental status  4) Seizure, resolved  5) Metabolic acidosis with elevated anion gap (mild), resolved  6) Acute ventilatory dependent respiratory failure, resolved  7) Respiratory alkalosis, resolved  8) Tachycardia  9) Hyperthermia, resolved  10) Rhabdomyolysis, improving    RECOMMENDATIONS:  The patient has been successfully extubated and on exam is in no acute distress  She is still somewhat disoriented but does answer many questions appropriately  She still exhibits mild serotonergic toxicity but has improved substantially  I would not expect any further significant toxicity and the patient is appropriate for transfer to the floor  At this point I would expect full resolution of symptoms and return to normal baseline in the next 1-2 days  The patient did admit to me that she overdosed on both her paroxetine and her ziprasidone  She denies taking her levothyroxine  She is not the most reliable historian and so I would still recommend she be watched for signs/ symptoms of developing thyrotoxicosis over the next 3-5 days  It is appropriate for her to be transferred to psych when she is back to her baseline, but they need to be aware to watch for development of tachycardia, hypertension, agitation, tremor, etc     Subjective: The patient was extubated this morning  She c/o sore throat but denies any other c/o including chest pain, SOB, abd pain, N/V, headache, dizziness  She admits to me that she took large quantities of both her paroxetine and ziprasidone  She denies other overdoses  She admits self harm attempt  Objective:     Physical Exam   Constitutional: She appears well-developed and well-nourished   No distress  HENT:   Head: Normocephalic and atraumatic  Mouth/Throat: Oropharynx is clear and moist    Eyes: Conjunctivae are normal  Pupils are equal, round, and reactive to light  Neck: Neck supple  Cardiovascular: Normal rate, regular rhythm, normal heart sounds and intact distal pulses  Pulmonary/Chest: Effort normal and breath sounds normal  No respiratory distress  Abdominal: Soft  She exhibits no distension  There is no tenderness  Musculoskeletal: Normal range of motion  She exhibits no edema  Neurological: She is alert  Oriented to person and place, not to time  No focal deficit  Mildly increased tone  2+ bilateral patellar DTR's  No clonus  Skin: Skin is warm and dry  Vitals reviewed  Vitals: Blood pressure (!) 177/91, pulse (!) 112, temperature 99 5 °F (37 5 °C), temperature source Probe, resp  rate (!) 23, height 5' 6" (1 676 m), weight 70 6 kg (155 lb 10 3 oz), SpO2 100 %  Intake/Output Summary (Last 24 hours) at 08/15/18 1511  Last data filed at 08/15/18 1202   Gross per 24 hour   Intake          5536 07 ml   Output             2900 ml   Net          2636 07 ml         Invasive Devices     Peripheral Intravenous Line            Peripheral IV 08/13/18 Right Forearm 2 days          Drain            Urethral Catheter Temperature probe 16 Fr  2 days                Lab, Imaging and other studies: I have personally reviewed pertinent reports

## 2018-08-15 NOTE — RESPIRATORY THERAPY NOTE
RT Ventilator Management Note  Brook Baires 62 y o  female MRN: [id number]  Unit/Bed#: [facility] 01 Encounter: [id number]      Daily Screen       8/13/2018 1045 8/14/2018 0723          Patient safety screen outcome[de-identified] Failed Failed      Not Ready for Weaning due to[de-identified] Underline problem not resolved Underline problem not resolved              Physical Exam:   Assessment Type: Assess only  Respiratory Pattern: Assisted  Chest Assessment: Chest expansion symmetrical  Bilateral Breath Sounds: Diminished, Clear  O2 Device: vent      Resp Comments: pt on AC/VC mode/settings all evening/night   No issues while on vent

## 2018-08-15 NOTE — NUTRITION
Replete Phos and Na+  Recommend Low Cholesterol, Non-Ulcerogenic diet when safely advanced to solids  Check Ionized Ca  Reconsult if TF needed

## 2018-08-15 NOTE — PROGRESS NOTES
Progress Note - Critical Care   Choco Monae 62 y o  female MRN: [id number]  Unit/Bed#: [facility] 01 Encounter: [id number]    Assessment:   1  Serotonin syndrome  2  Hyponatremia  3  Toxic metabolic encephalopathy  4  Status epilepticus  5  Rhabdomyolysis  6  Acute hypoxic respiratory failure  7  Tachycardia  8  Hyperthermia    Plan:   Neuro:   · Analgesia/sedation: propofol/precedex  Wean and potentially hold sedation today to attempt SBT  · Status epilepticus: no overt seizures on cEEG, likely can discontinue today  · Serotonin syndrome: valium 5mg q4 hours prn  · Frequent neuro exams  · Daily CAM-ICU    CV:   · Goal MAP >65  · No active issues    Lung:   · Acute hypoxic respiratory failure: Attempt SBT today with possible extubation  · Pulmonary toilet    GI:   · Protonix daily   · Add bowel regimen    FEN:   · Maintenance fluids: currently on half normal at 200cc/hr  Would begin to decrease as sodium has stablized, CK also improved  · Replete electrolytes for goal Mag >2 0, Phos >3 0, K >4 0  · Jevity 1 2 at goal of 40cc/hr w/ FWF 100cc q6 hours  Hold for possible extubation  :   · Monitor I/Os    ID:   · Monitor fever curve/white count    Heme:   · Lovenox  · SCDs    Endo:   · Levothyroxine 25mcg daily    Msk/Skin:   · Frequent turns and repositioning, offloading  · No need to further trend CK    Disposition:   · ICU    ______________________________________________________________________  Chief Complaint: Intubated/sedated    HPI/24hr events: Episode of hypotension overnight, precedex and propofol held and patient given 1L LR with response  With sedation on hold, cEEG with generalized triphasic waves consistent with brain irritability/toxic metabolic encephalopathy   Per epileptology recommendations, propofol and precedex restarted   ______________________________________________________________________  Temperature:   Temp (24hrs), Av 2 °F (37 3 °C), Min:97 9 °F (36 6 °C), Max:100 °F (37 8 °C)    Current Temperature: 98 6 °F (37 °C)    Vitals:    08/15/18 0405 08/15/18 0500 08/15/18 0600 08/15/18 0700   BP:  102/54 90/52 108/63   BP Location:       Pulse:  84 76 70   Resp:  12 12 12   Temp:  99 °F (37 2 °C) 98 6 °F (37 °C) 98 6 °F (37 °C)   TempSrc:    Probe   SpO2: 100% 98% 99% 100%   Weight:       Height:           Weights:   IBW: 59 3 kg    Body mass index is 27 57 kg/m²  Weight (last 2 days)     Date/Time   Weight    08/13/18 0956  70 6 (155 65)    08/13/18 0653  79 (174 16)            Height: 5' 6" (167 6 cm)    Ventilator Settings:   Vent Mode: AC/VC  Resp Rate (BPM): 12 BPM  Vt (mL): 450 mL  FIO2 (%): 40 %  PEEP (cmH2O): 5 cmH2O  SpO2: 100 %    No results found for: PHART, BLY3GGJ, PO2ART, OIT3XDE, N5KOFHVH, BEART, SOURCE  SpO2: SpO2: 100 %    ______________________________________________________________________  Physical Exam:  Banks Agitation Sedation Scale (RASS): Restless  Physical Exam   Constitutional: Vital signs are normal   Non-toxic appearance  No distress  She is sedated, intubated and restrained  Middle aged female, intubated/sedated  EEG in place   HENT:   Head: Normocephalic and atraumatic  Eyes: Pupils are equal, round, and reactive to light  Cardiovascular: Normal rate, regular rhythm and normal heart sounds  Pulses:       Radial pulses are 2+ on the right side, and 2+ on the left side  Dorsalis pedis pulses are 2+ on the right side, and 2+ on the left side  Pulmonary/Chest: No accessory muscle usage  She is intubated  No respiratory distress  She has no decreased breath sounds  Breath sounds clear to auscultation b/l   Abdominal: Soft  She exhibits no distension  There is no tenderness  Genitourinary:   Genitourinary Comments: Tova present   Neurological: She is alert  GCS eye subscore is 4  GCS verbal subscore is 1  GCS motor subscore is 6  Skin: Skin is warm, dry and intact  She is not diaphoretic  ______________________________________________________________________  Intake and Outputs:  I/O       08/13 0701 - 08/14 0700 08/14 0701 - 08/15 0700 08/15 0701 - 08/16 0700    I V  (mL/kg) 1718 8 (24 3) 3456 4 (49)     NG/ 200     IV Piggyback  1250     Feedings  168     Total Intake(mL/kg) 1848 8 (26 2) 5074 4 (71 9)     Urine (mL/kg/hr) 5400 (3 2) 2590 (1 5)     Emesis/NG output 1100 325     Total Output 6500 2915      Net -4651 3 +2159 4                 UOP: 1 5cc/kg/hour   Nutrition:        Diet Orders            Start     Ordered    08/14/18 1712  Diet Enteral/Parenteral; Tube Feeding No Oral Diet; Jevity 1 2 Armando; Continuous; 10; 100; Water; Every 6 hours  Diet effective now     Comments:  Increase by 10-20 cc/hr every 4 hours to goal 40 cc/hr   Question Answer Comment   Diet Type Enteral/Parenteral    Enteral/Parenteral Tube Feeding No Oral Diet    Tube Feeding Formula: Jevity 1 2 Armando    Bolus/Cyclic/Continuous Continuous    Tube Feeding Continuous rate (mL/hr): 10    Tube Feeding water flush (mL): 100    Water Flush type: Water    Water flush frequency: Every 6 hours    RD to adjust diet per protocol?  Yes        08/14/18 1712        Formula: Jevity 1 2, currently running at 40ml/hr, with a goal of 40ml/hr  Labs:     Results from last 7 days  Lab Units 08/15/18  0652 08/14/18  0930 08/14/18  0438   WBC Thousand/uL 4 14* 5 95 5 66   HEMOGLOBIN g/dL 10 2* 13 7 12 3   HEMATOCRIT % 30 5* 39 9 34 7*   PLATELETS Thousands/uL 156 214 223   NEUTROS PCT % 75 76* 84*   MONOS PCT % 12 12 8      Results from last 7 days  Lab Units 08/15/18  0442 08/15/18  0014 08/14/18  2033  08/13/18  0703   SODIUM mmol/L 128* 127* 126*  < > 112*   POTASSIUM mmol/L 3 9 4 0 3 8  < > 3 7   CHLORIDE mmol/L 97* 97* 95*  < > 75*   CO2 mmol/L 22 22 24  < > 21   BUN mg/dL 4* 4* 5  < > 14   CREATININE mg/dL 0 55* 0 57* 0 53*  < > 0 85   CALCIUM mg/dL 8 0* 7 5* 7 5*  < > 8 7   TOTAL PROTEIN g/dL  --   --   --   --  7 4   BILIRUBIN TOTAL mg/dL  --   --   --   --  1 09*   ALK PHOS U/L  --   --   --   --  75   ALT U/L  --   --   --   --  34   AST U/L  --   --   --   --  120*   GLUCOSE RANDOM mg/dL 103 98 97  < > 109   GLUCOSE, ISTAT   --   --   --   < >  --    < > = values in this interval not displayed  Results from last 7 days  Lab Units 08/15/18  0442 08/14/18  0438   MAGNESIUM mg/dL 2 2 2 0       Results from last 7 days  Lab Units 08/15/18  0442 08/14/18  0438   PHOSPHORUS mg/dL 2 0* 1 6*        Results from last 7 days  Lab Units 08/13/18  0718   INR  1 00   PTT seconds 27       Results from last 7 days  Lab Units 08/13/18  1823   LACTIC ACID mmol/L 2 3*       0  Lab Value Date/Time   TROPONINI <0 02 08/13/2018 0703     Imaging: No new imaging I have personally reviewed pertinent reports  Micro:  Blood Culture:   Lab Results   Component Value Date    BLOODCX No Growth at 24 hrs  08/13/2018    BLOODCX No Growth at 24 hrs  08/13/2018     Urine Culture:   Lab Results   Component Value Date    URINECX 70,000-79,000 cfu/ml Mixed Contaminants X5 09/24/2016     Sputum Culture: No components found for: SPUTUMCX  Wound Culure: No results found for: [facility]     Lab Results   Component Value Date    BLOODCX No Growth at 24 hrs  08/13/2018    BLOODCX No Growth at 24 hrs  08/13/2018    URINECX 70,000-79,000 cfu/ml Mixed Contaminants X5 09/24/2016     Allergies:    Allergies   Allergen Reactions    Latex     Risperdal [Risperidone]     Zyprexa [Olanzapine]      Medications:   Scheduled Meds:    Current Facility-Administered Medications:  acetaminophen 650 mg Oral Q4H PRN Brett Pedro PA-C    chlorhexidine 15 mL Swish & Spit Q12H [address] [facility], [place]    dexmedetomidine 0 1-0 7 mcg/kg/hr Intravenous Titrated Marcella Holt MD Last Rate: 0 1 mcg/kg/hr (08/15/18 0425)   diazepam 5 mg Intravenous Q4H PRN Marcella Holt MD    enoxaparin 40 mg Subcutaneous Daily Brett Pedro PA-C    levothyroxine 25 mcg Oral Early Morning Doug Clemons Kelly Muller PA-C    metoprolol 5 mg Intravenous Q6H PRN Filemon Moncada PA-C    potassium phosphate 12 mmol Intravenous Once Ellyn Parada PA-C Last Rate: 12 mmol (08/15/18 0813)   propofol 5-50 mcg/kg/min Intravenous Titrated Filemon Moncada PA-C Last Rate: 30 mcg/kg/min (08/15/18 9972)   senna-docusate sodium 1 tablet Oral HS Ellyn Parada PA-C    sodium chloride 200 mL/hr Intravenous Continuous Filemon Moncada PA-C Last Rate: 200 mL/hr (08/15/18 2166)     Continuous Infusions:    dexmedetomidine 0 1-0 7 mcg/kg/hr Last Rate: 0 1 mcg/kg/hr (08/15/18 0425)   propofol 5-50 mcg/kg/min Last Rate: 30 mcg/kg/min (08/15/18 0066)   sodium chloride 200 mL/hr Last Rate: 200 mL/hr (08/15/18 5904)     PRN Meds:    acetaminophen 650 mg Q4H PRN   diazepam 5 mg Q4H PRN   metoprolol 5 mg Q6H PRN     VTE Pharmacologic Prophylaxis:   Pharmacologic: Enoxaparin (Lovenox)  Mechanical VTE Prophylaxis in Place: Yes    Invasive lines and devices: Invasive Devices     Peripheral Intravenous Line            Peripheral IV 08/13/18 Left Antecubital 2 days    Peripheral IV 08/13/18 Right Forearm 2 days          Drain            Urethral Catheter Temperature probe 16 Fr  2 days    NG/OG/Enteral Tube Orogastric 18 Fr Right mouth 1 day          Airway            ETT  Hi-Lo 7 5 mm 1 day                   Counseling / Coordination of Care  Total Critical Care time spent 36 minutes excluding procedures, teaching and family updates        Code Status: Level 1 - Full Code      Ellyn Parada PA-C

## 2018-08-15 NOTE — RESPIRATORY THERAPY NOTE
RT Ventilator Management Note  Nate Juárez 62 y o  female MRN: [id number]  Unit/Bed#: [facility] 01 Encounter: [id number]      Daily Screen       8/14/2018 0723 8/15/2018 0814          Patient safety screen outcome[de-identified] Failed Passed      Not Ready for Weaning due to[de-identified] Underline problem not resolved -              Physical Exam:   Assessment Type: Assess only  Respiratory Pattern: Assisted  Chest Assessment: Chest expansion symmetrical  O2 Device: vent      Resp Comments: Pt  awake placed pt on CPAP/PS wean5/5

## 2018-08-15 NOTE — PROGRESS NOTES
Critical Care Interval Progress Note:   Emre Angel 62 y o  female MRN: [id number]    [facility]: [id number]    Summary of Critical Care:    Around 9:15pm, had episode of hypotension that resolved with cessation of dexmedetomidine and propofol  Blood pressure improved with 1 L bolus of ringers lactate and was able to resume dexmedetomidine  Around 11:50 p m , discussed case with epileptologist Dr Cecily Boucher, EEG shows increase in try phase again activity that is temporally associated with discontinuation of propofol  All this is not overtly consistent with seizure it is consistent with toxic metabolic encephalopathy and brain irritability  His recommendation was either resumption of Ebnita-ergic agent or initiation of anti-epileptic medication  Will plan for resumption of propofol infusion for sedation and reduction in brain EEG irritability  May have been too soon interval to discontinue propofol from serotonin syndrome perspective and can reattempt discontinuation tomorrow  Counseling / Coordination of Care: Total Critical Care time spent 20 minutes excluding procedures, teaching and family updates         Furnas Bolton, DO

## 2018-08-15 NOTE — MEDICAL STUDENT
MEDICAL STUDENT  Inpatient Progress Note for TRAINING ONLY  Not Part of Legal Medical Record       Progress Note - Critical Care   Brook Baires 62 y o  female MRN: [id number]  [facility] En[facility]: [id number]    Assessment:     Status epilepticus    Hyponatremia    Serotonin syndrome    Altered mental status    Acute respiratory failure (HCC)    Non-traumatic rhabdomyolysis    Hypophosphatemia    Tachycardia    Hypothyroidism    Plan:           Neuro: Pt with episode of hypotension after stopping precedex and propofol  EEG showed triphasic waves, consistent with toxic metabolic encephalopathy, after stopping propofol  Was restarted on propofol  No electrographic evidence for seizures  - Will attempt to wean off propofol today and reassess   - Valium 5 mg q4h PRN                 CV: Pt with episode of hypotension last night, responsive to 1L LR    - Lopressor 5 mg PRN for SBP >160                 Lung: Pt initially admitted for acute hypoxic respiratory failure  - Attempt spontaneous breathing trial today, with possible extubation if successful                 GI: no active issues   - Protonix                  FEN:    - Fluids: currently on 0 45% saline @ 200 mL/hr   - Electrolytes:    - Sodium: 128, up from 112 48 hours ago; initially slightly overcorrected; goal Na+    134-136 today    - Phosphorus: replete with 12 mmol, repeat phosphorus level    - Potassium: hypokalemia resolved    - Monitor BMP   - Nutrition: will hold tube feeds for possible extubation                 : no active issues   - Bernardo in place                 ID: no active issues   - Blood cultures w/ no growth @ 24 hr                 Heme: Hgb dropped to 10 2 from 13 7 yesterday   Upon chart review, pt is anemic at baseline with baseline Hgb around 8     - Lovenox for VTE ppx                 Endo: Pt with hx of hypothyroidism   - Continue Synthroid                            Msk/Skin:    - Rhabdomyolysis: improved to 6,559 from 12,420    - Since CK in downtrend, can stop following CK   - Reposition q2h                 Disposition: MICU    Chief Complaint: altered mental status    HPI/24hr events:   Overnight, pt had an episode of hypotension after the precedex and propofol were stopped  Her BP improved with 1 L LR, after which precedex was restarted  After this event, the case was discussed with Dr Larry Resendiz of Epilepsy, who reported that her EEG showed an increase in triphasic waves that was temporally associated with discontinuation of propofol  Triphasic waves are reportedly consistent with toxic metabolic encephalopathy  He had recommended either resuming a Benita-ergic agent or starting an AED  Propofol was resumed  Pt currently on 5/5 PS, 40% FiO2  Pt awake, sluggish  Following some commands  Vitals:    08/15/18 0300 08/15/18 0400 08/15/18 0405 08/15/18 0500   BP: 140/79 (!) 159/109  102/54   BP Location:  Right arm     Pulse: 92 (!) 106  84   Resp: 18 22  12   Temp: 97 9 °F (36 6 °C) 99 °F (37 2 °C)  99 °F (37 2 °C)   TempSrc:       SpO2: 100% 100% 100% 98%   Weight:       Height:         Temperature: ranged between 97 9 F and 100 F yesterday, most recently 98 6 F  HR: 90s-100s throughout the day yesterday, bounced between 70s and 100s overnight, most recently 82 bpm  BP: became hypotensive, then has been between being hypotensive and hypertensive overnight; most recent BP of 127/75 with MAP of 104  SpO2: %    Temperature:   Temp (24hrs), Av 3 °F (37 4 °C), Min:97 9 °F (36 6 °C), Max:100 °F (37 8 °C)    Current: Temperature: 99 °F (37 2 °C)    Weights:   IBW: 59 3 kg    Body mass index is 27 57 kg/m²    Weight (last 2 days)     Date/Time   Weight    18 0956  70 6 (155 65)    18 0653  79 (174 16)            Physical Exam:   General: awake, sluggish, follows some commands in no acute distress  Head: NC/AT  Eyes: PERRL  Mouth/Throat: intubated  CV: regular rate and rhythm, no audible murmurs  Resp: clear to auscultation b/l  Abdomen: soft, non-distended, non-tender  Extremities: no edema  Neuro: awake, follows most commands, moves extremities spontaneously       Non-Invasive/Invasive Ventilation Settings:  Respiratory    Lab Data (Last 4 hours)    None         O2/Vent Data (Last 4 hours)      08/15 0405           Vent Mode AC/VC       Resp Rate (BPM) (BPM) 12       Vt (mL) (mL) 450       FIO2 (%) (%) 40       PEEP (cmH2O) (cmH2O) 5       MV 12 9                 No results found for: PHART, UJK4XVS, PO2ART, DGJ4NCJ, T9AJYHVR, BEART, SOURCE  SpO2: SpO2: 98 %, SpO2 Activity: SpO2 Activity: At Rest, SpO2 Device: O2 Device: Other (comment) (ventilator), Capnography: Capnography: No    Intake and Outputs:  I/O       08/13 0701 - 08/14 0700 08/14 0701 - 08/15 0700    I V  (mL/kg) 1718 8 (24 3) 3025 2 (42 8)    NG/ 100    IV Piggyback  1250    Feedings  111    Total Intake(mL/kg) 1848 8 (26 2) 4486 2 (63 5)    Urine (mL/kg/hr) 5400 (3 2) 1240 (0 7)    Emesis/NG output 1100 25    Total Output 6500 1265    Net -4651 3 +3221 2              Intake: 4 64 L  Output: 2 92 L   Net: +1 73L    Nutrition:        Diet Orders            Start     Ordered    08/14/18 1712  Diet Enteral/Parenteral; Tube Feeding No Oral Diet; Jevity 1 2 Armando; Continuous; 10; 100; Water; Every 6 hours  Diet effective now     Comments:  Increase by 10-20 cc/hr every 4 hours to goal 40 cc/hr   Question Answer Comment   Diet Type Enteral/Parenteral    Enteral/Parenteral Tube Feeding No Oral Diet    Tube Feeding Formula: Jevity 1 2 Armando    Bolus/Cyclic/Continuous Continuous    Tube Feeding Continuous rate (mL/hr): 10    Tube Feeding water flush (mL): 100    Water Flush type: Water    Water flush frequency: Every 6 hours    RD to adjust diet per protocol?  Yes        08/14/18 1712          Labs:     Results from last 7 days  Lab Units 08/15/18  0652 08/14/18  0930 08/14/18  0438   WBC Thousand/uL 4 14* 5 95 5 66   HEMOGLOBIN g/dL 10 2* 13 7 12 3 HEMATOCRIT % 30 5* 39 9 34 7*   PLATELETS Thousands/uL 156 214 223   NEUTROS PCT % 75 76* 84*   MONOS PCT % 12 12 8      Results from last 7 days  Lab Units 08/15/18  0442 08/15/18  0014 08/14/18  2033  08/13/18  0703   SODIUM mmol/L 128* 127* 126*  < > 112*   POTASSIUM mmol/L 3 9 4 0 3 8  < > 3 7   CHLORIDE mmol/L 97* 97* 95*  < > 75*   CO2 mmol/L 22 22 24  < > 21   BUN mg/dL 4* 4* 5  < > 14   CREATININE mg/dL 0 55* 0 57* 0 53*  < > 0 85   CALCIUM mg/dL 8 0* 7 5* 7 5*  < > 8 7   TOTAL PROTEIN g/dL  --   --   --   --  7 4   BILIRUBIN TOTAL mg/dL  --   --   --   --  1 09*   ALK PHOS U/L  --   --   --   --  75   ALT U/L  --   --   --   --  34   AST U/L  --   --   --   --  120*   GLUCOSE RANDOM mg/dL 103 98 97  < > 109   GLUCOSE, ISTAT   --   --   --   < >  --    < > = values in this interval not displayed  Results from last 7 days  Lab Units 08/15/18  0442 08/14/18  0438   MAGNESIUM mg/dL 2 2 2 0       Results from last 7 days  Lab Units 08/15/18  0442 08/14/18  0438   PHOSPHORUS mg/dL 2 0* 1 6*        Results from last 7 days  Lab Units 08/13/18  0718   INR  1 00   PTT seconds 27       Results from last 7 days  Lab Units 08/13/18  1823   LACTIC ACID mmol/L 2 3*       0  Lab Value Date/Time   TROPONINI <0 02 08/13/2018 0703     Lab Results   Component Value Date    CKTOTAL 6,559 (H) 08/14/2018    CKMBINDEX <1 0 08/14/2018    TROPONINI <0 02 08/13/2018     Total CK: down from 12,420 on 8/13    Imaging:   CXR 8/14      INDICATION:   intubation      COMPARISON:  1 day prior     EXAM PERFORMED/VIEWS:  XR CHEST PORTABLE        FINDINGS:  Endotracheal tube and nasogastric tube in place      Cardiomediastinal silhouette appears unremarkable      Subsegmental atelectasis right perihilar region      Osseous structures appear within normal limits for patient age      IMPRESSION:     Minimal subsegmental atelectasis right perihilar region      EEG: Report date: 8/14/2018                                                                             Start time: 8/13/2018 13:31  End time: 8/14/2018 08:00    Findings:   Background Activity: The background is grossly symmetric with respect to voltages and activities  During periods of suspected wakefulness, the background is significantly disorganized with moderate voltage 1-2 Hz delta activity and admixed very low voltage beta activity  During wakefulness, there is 1 Hz generalized sharp waves with triphasic morphology and anterior to posterior phase lag  These triphasic waves appear after the patient is stimulated and abates when the patient enters sleep  During periods of sleep, there are periods of discontinuity, sleep spindles, and central theta activity  Interpretation: This is an abnormal 18 5 hours continuous video EEG recording due to background disorganization, triphasic waves, and diffuse slow activity  These findings are consistent with moderate diffuse cerebral dysfunction, etiologically nonspecific  Triphasic waves are typically seen in the setting of toxic-metabolic encephalopathy, including medications, hepatic, uremic, or pulmonary causes       There are no electrographic seizures  Micro:  Lab Results   Component Value Date    BLOODCX No Growth at 24 hrs  08/13/2018    BLOODCX No Growth at 24 hrs  08/13/2018    URINECX 70,000-79,000 cfu/ml Mixed Contaminants X5 09/24/2016       Allergies:    Allergies   Allergen Reactions    Latex     Risperdal [Risperidone]     Zyprexa [Olanzapine]        Medications:   Scheduled Meds:  Current Facility-Administered Medications:  acetaminophen 650 mg Oral Q4H PRN Gabriela Aguirre PA-C    chlorhexidine 15 mL Swish [employer] Spit Q12H [address] [facility], [place]    dexmedetomidine 0 1-0 7 mcg/kg/hr Intravenous Titrated Keena Cardenas MD Last Rate: 0 1 mcg/kg/hr (08/15/18 0425)   diazepam 5 mg Intravenous Q4H PRN Keena Cardenas MD    enoxaparin 40 mg Subcutaneous Daily Barnard Buddle, PA-C    levothyroxine 25 mcg Oral Early Morning Barnard Buddle, PA-C    metoprolol 5 mg Intravenous Q6H PRN Barnard Buddle, PA-C    pantoprazole 40 mg Intravenous Early Morning Barnard Buddle, PA-C    propofol 5-50 mcg/kg/min Intravenous Titrated Barnard Buddle, PA-C Last Rate: 30 mcg/kg/min (08/15/18 0124)   sodium chloride 200 mL/hr Intravenous Continuous Barnard Buddle, PA-C Last Rate: 200 mL/hr (08/15/18 0049)     Continuous Infusions:  dexmedetomidine 0 1-0 7 mcg/kg/hr Last Rate: 0 1 mcg/kg/hr (08/15/18 0425)   propofol 5-50 mcg/kg/min Last Rate: 30 mcg/kg/min (08/15/18 0124)   sodium chloride 200 mL/hr Last Rate: 200 mL/hr (08/15/18 0049)     PRN Meds:    acetaminophen 650 mg Q4H PRN   diazepam 5 mg Q4H PRN   metoprolol 5 mg Q6H PRN       VTE Pharmacologic Prophylaxis: Enoxaparin (Lovenox)  VTE Mechanical Prophylaxis: sequential compression device    Invasive lines and devices:   Invasive Devices     Peripheral Intravenous Line            Peripheral IV 08/13/18 Left Antecubital 1 day    Peripheral IV 08/13/18 Right Forearm 1 day          Drain            NG/OG/Enteral Tube Orogastric 18 Fr Right mouth 1 day    Urethral Catheter Temperature probe 16 Fr  1 day          Airway            ETT  Hi-Lo 7 5 mm 1 day              Code Status: Level 1 - Full Code     July Mitchell

## 2018-08-15 NOTE — SOCIAL WORK
YASMIN met with [address]   Ashley requested a ROSETTE is signed for them by pt  Plan is for pt to be extubated today  CM to meet with pt to have her sign ROSETTE if appropriate  Ashley requested pt clinicals

## 2018-08-16 PROBLEM — T50.901A OVERDOSE: Status: RESOLVED | Noted: 2018-08-14 | Resolved: 2018-08-16

## 2018-08-16 PROBLEM — E87.6 HYPOKALEMIA: Status: ACTIVE | Noted: 2018-08-16

## 2018-08-16 PROBLEM — D72.819 LEUKOPENIA: Status: ACTIVE | Noted: 2018-08-16

## 2018-08-16 PROBLEM — G40.901 STATUS EPILEPTICUS (HCC): Status: RESOLVED | Noted: 2018-08-13 | Resolved: 2018-08-16

## 2018-08-16 PROBLEM — R50.9 HYPERTHERMIA: Status: RESOLVED | Noted: 2018-08-13 | Resolved: 2018-08-16

## 2018-08-16 PROBLEM — E87.29 METABOLIC ACIDOSIS, INCREASED ANION GAP: Status: RESOLVED | Noted: 2018-08-13 | Resolved: 2018-08-16

## 2018-08-16 PROBLEM — E87.2 METABOLIC ACIDOSIS, INCREASED ANION GAP: Status: RESOLVED | Noted: 2018-08-13 | Resolved: 2018-08-16

## 2018-08-16 PROBLEM — E83.39 HYPOPHOSPHATEMIA: Status: RESOLVED | Noted: 2018-08-14 | Resolved: 2018-08-16

## 2018-08-16 PROBLEM — M62.82 NON-TRAUMATIC RHABDOMYOLYSIS: Status: RESOLVED | Noted: 2018-08-13 | Resolved: 2018-08-16

## 2018-08-16 PROBLEM — J96.00 ACUTE RESPIRATORY FAILURE (HCC): Status: RESOLVED | Noted: 2018-08-13 | Resolved: 2018-08-16

## 2018-08-16 PROBLEM — F33.2 MAJOR DEPRESSIVE DISORDER, RECURRENT SEVERE WITHOUT PSYCHOTIC FEATURES (HCC): Status: ACTIVE | Noted: 2018-08-16

## 2018-08-16 PROBLEM — E83.51 HYPOCALCEMIA: Status: ACTIVE | Noted: 2018-08-16

## 2018-08-16 LAB
ANION GAP SERPL CALCULATED.3IONS-SCNC: 10 MMOL/L (ref 4–13)
ANION GAP SERPL CALCULATED.3IONS-SCNC: 4 MMOL/L (ref 4–13)
BASOPHILS # BLD AUTO: 0.02 THOUSANDS/ΜL (ref 0–0.1)
BASOPHILS NFR BLD AUTO: 1 % (ref 0–1)
BUN SERPL-MCNC: 2 MG/DL (ref 5–25)
BUN SERPL-MCNC: 8 MG/DL (ref 5–25)
CA-I BLD-SCNC: 1.06 MMOL/L (ref 1.12–1.32)
CALCIUM SERPL-MCNC: 7.9 MG/DL (ref 8.3–10.1)
CALCIUM SERPL-MCNC: 9.1 MG/DL (ref 8.3–10.1)
CHLORIDE SERPL-SCNC: 93 MMOL/L (ref 100–108)
CHLORIDE SERPL-SCNC: 95 MMOL/L (ref 100–108)
CO2 SERPL-SCNC: 25 MMOL/L (ref 21–32)
CO2 SERPL-SCNC: 28 MMOL/L (ref 21–32)
CREAT SERPL-MCNC: 0.35 MG/DL (ref 0.6–1.3)
CREAT SERPL-MCNC: 0.73 MG/DL (ref 0.6–1.3)
EOSINOPHIL # BLD AUTO: 0.08 THOUSAND/ΜL (ref 0–0.61)
EOSINOPHIL NFR BLD AUTO: 2 % (ref 0–6)
ERYTHROCYTE [DISTWIDTH] IN BLOOD BY AUTOMATED COUNT: 13.5 % (ref 11.6–15.1)
GFR SERPL CREATININE-BSD FRML MDRD: 121 ML/MIN/1.73SQ M
GFR SERPL CREATININE-BSD FRML MDRD: 92 ML/MIN/1.73SQ M
GLUCOSE SERPL-MCNC: 106 MG/DL (ref 65–140)
GLUCOSE SERPL-MCNC: 88 MG/DL (ref 65–140)
GLUCOSE SERPL-MCNC: 92 MG/DL (ref 65–140)
GLUCOSE SERPL-MCNC: 98 MG/DL (ref 65–140)
HCT VFR BLD AUTO: 31 % (ref 34.8–46.1)
HGB BLD-MCNC: 10.7 G/DL (ref 11.5–15.4)
IMM GRANULOCYTES # BLD AUTO: 0.02 THOUSAND/UL (ref 0–0.2)
IMM GRANULOCYTES NFR BLD AUTO: 1 % (ref 0–2)
LYMPHOCYTES # BLD AUTO: 0.49 THOUSANDS/ΜL (ref 0.6–4.47)
LYMPHOCYTES NFR BLD AUTO: 13 % (ref 14–44)
MCH RBC QN AUTO: 29.6 PG (ref 26.8–34.3)
MCHC RBC AUTO-ENTMCNC: 34.5 G/DL (ref 31.4–37.4)
MCV RBC AUTO: 86 FL (ref 82–98)
MONOCYTES # BLD AUTO: 0.41 THOUSAND/ΜL (ref 0.17–1.22)
MONOCYTES NFR BLD AUTO: 11 % (ref 4–12)
NEUTROPHILS # BLD AUTO: 2.88 THOUSANDS/ΜL (ref 1.85–7.62)
NEUTS SEG NFR BLD AUTO: 72 % (ref 43–75)
NRBC BLD AUTO-RTO: 0 /100 WBCS
PLATELET # BLD AUTO: 170 THOUSANDS/UL (ref 149–390)
PMV BLD AUTO: 8.8 FL (ref 8.9–12.7)
POTASSIUM SERPL-SCNC: 3.3 MMOL/L (ref 3.5–5.3)
POTASSIUM SERPL-SCNC: 3.7 MMOL/L (ref 3.5–5.3)
RBC # BLD AUTO: 3.61 MILLION/UL (ref 3.81–5.12)
SODIUM SERPL-SCNC: 125 MMOL/L (ref 136–145)
SODIUM SERPL-SCNC: 130 MMOL/L (ref 136–145)
WBC # BLD AUTO: 3.9 THOUSAND/UL (ref 4.31–10.16)

## 2018-08-16 PROCEDURE — 99232 SBSQ HOSP IP/OBS MODERATE 35: CPT | Performed by: FAMILY MEDICINE

## 2018-08-16 PROCEDURE — 80048 BASIC METABOLIC PNL TOTAL CA: CPT | Performed by: PHYSICIAN ASSISTANT

## 2018-08-16 PROCEDURE — 99222 1ST HOSP IP/OBS MODERATE 55: CPT | Performed by: PSYCHIATRY & NEUROLOGY

## 2018-08-16 PROCEDURE — 82948 REAGENT STRIP/BLOOD GLUCOSE: CPT

## 2018-08-16 PROCEDURE — 82330 ASSAY OF CALCIUM: CPT | Performed by: PHYSICIAN ASSISTANT

## 2018-08-16 PROCEDURE — 84100 ASSAY OF PHOSPHORUS: CPT | Performed by: FAMILY MEDICINE

## 2018-08-16 PROCEDURE — 85025 COMPLETE CBC W/AUTO DIFF WBC: CPT | Performed by: PHYSICIAN ASSISTANT

## 2018-08-16 PROCEDURE — 99232 SBSQ HOSP IP/OBS MODERATE 35: CPT | Performed by: INTERNAL MEDICINE

## 2018-08-16 RX ORDER — PANTOPRAZOLE SODIUM 20 MG/1
20 TABLET, DELAYED RELEASE ORAL
Status: DISCONTINUED | OUTPATIENT
Start: 2018-08-16 | End: 2018-08-20 | Stop reason: HOSPADM

## 2018-08-16 RX ORDER — POTASSIUM CHLORIDE 20 MEQ/1
40 TABLET, EXTENDED RELEASE ORAL ONCE
Status: DISCONTINUED | OUTPATIENT
Start: 2018-08-16 | End: 2018-08-16

## 2018-08-16 RX ORDER — POTASSIUM CHLORIDE 20MEQ/15ML
40 LIQUID (ML) ORAL ONCE
Status: COMPLETED | OUTPATIENT
Start: 2018-08-16 | End: 2018-08-16

## 2018-08-16 RX ORDER — FERROUS SULFATE 325(65) MG
325 TABLET ORAL 2 TIMES DAILY
Status: DISCONTINUED | OUTPATIENT
Start: 2018-08-16 | End: 2018-08-20 | Stop reason: HOSPADM

## 2018-08-16 RX ADMIN — Medication 5 MG: at 01:53

## 2018-08-16 RX ADMIN — Medication 325 MG: at 18:20

## 2018-08-16 RX ADMIN — Medication 2 G: at 09:53

## 2018-08-16 RX ADMIN — ENOXAPARIN SODIUM 40 MG: 40 INJECTION SUBCUTANEOUS at 09:54

## 2018-08-16 RX ADMIN — POTASSIUM CHLORIDE 20 MEQ: 2 INJECTION, SOLUTION, CONCENTRATE INTRAVENOUS at 03:27

## 2018-08-16 RX ADMIN — POTASSIUM CHLORIDE 40 MEQ: 20 SOLUTION ORAL at 09:53

## 2018-08-16 RX ADMIN — Medication 325 MG: at 12:18

## 2018-08-16 RX ADMIN — PANTOPRAZOLE SODIUM 20 MG: 20 TABLET, DELAYED RELEASE ORAL at 23:53

## 2018-08-16 RX ADMIN — SODIUM CHLORIDE 100 ML/HR: 0.45 INJECTION, SOLUTION INTRAVENOUS at 06:04

## 2018-08-16 RX ADMIN — LEVOTHYROXINE SODIUM 25 MCG: 25 TABLET ORAL at 05:15

## 2018-08-16 NOTE — PROGRESS NOTES
Progress Note - ICU Transfer to Step down/med  surg  Kajal Pain 62 y o  female MRN: [id number]    Unit/Bed#: [facility]U 01 Encounter: [id number]    Code Status: Level 1 - Full Code  POA:    POLST:      Reason for ICU adm: Serotonin syndrome    Active problems:  Active Problems:    Serotonin syndrome    Hyponatremia    Hypothyroidism    Delirium    Tachycardia    Hypocalcemia    Hypokalemia    Leukopenia    Major depressive disorder, recurrent severe without psychotic features ([facility] 75 )    Consultants: Toxicology, psych     History of Present Illness/Summary of clinical course:     62 y o  female who presents to [facility] ED from home s/p EMS summon by mother for changes in mental status  The patient discussed with her mother this morning that she had taken extra doses of her Geodon  EMS arrived at the house and according to the history by a emergency department staff and corroborated by Toxicology Service, the only persons available at the time to get a history from, the patient had taken 3 extra geodon"  The family present at the time of emergency department evaluations stated that the patient also is on Seroquel and Paxil      There is suspicion that the patient may have taken additional doses of all of her medications, as multiple recent history transcriptions have shown that the patient attempted to contact PCP as an outpatient for treatment of back pain  This is the reason for suspicion that the patient may have been taking increases of all of her medications  At the home EMS found the patient significantly altered in appearance, non cohesive, had diaphoretic and tachycardic  In the emergency department the patient had a temperature of 100 8°, was tachycardic, additionally the patient had a witnessed seizure in the emergency department  She was sedated and intubated and placed on propofol    She still was having active seizure activity in the emergency department requiring increased doses of propofol and treatment with 2 doses of 20 mg of diazepam      In the intensive care unit the patient was sedated and ventilated Epileptologist evaluation was consulted for  Patient was admitted hyponatremic with a serum sodium of 112 that improved with fluid resuscitation  Over time her mentation improved and she was extubated on 8/15  She was evaluated by psych and signed a 201  She remains improving from serotonin syndrome      Outstanding/pending diagnostics: None       Mobilization Plan: PT/OT    Nutrition Plan: Regular diet with fluid restriction 2L    Discharge Plan: Further improvement of mentation back to baseline     Specific Diagnosis Plan:    · Serotonin syndrome: Valium 5mg Q4 PRN, psych to manage meds after clears  Signed 201, will be 1:1 on floor   · 1/2 blood cultures positive for GPC likely a contaminant     Spoke with Dr Jamar Martinez at [phone] regarding transfer       Etelvina Lopez PA-C

## 2018-08-16 NOTE — PLAN OF CARE
Problem: Potential for Falls  Goal: Patient will remain free of falls  INTERVENTIONS:  - Assess patient frequently for physical needs  -  Identify cognitive and physical deficits and behaviors that affect risk of falls    -  [place] fall precautions as indicated by assessment   - Educate patient/family on patient safety including physical limitations  - Instruct patient to call for assistance with activity based on assessment  - Modify environment to reduce risk of injury  - Consider OT/PT consult to assist with strengthening/mobility   Outcome: Progressing      Problem: Prexisting or High Potential for Compromised Skin Integrity  Goal: Skin integrity is maintained or improved  INTERVENTIONS:  - Identify patients at risk for skin breakdown  - Assess and monitor skin integrity  - Assess and monitor nutrition and hydration status  - Monitor labs (i e  albumin)  - Assess for incontinence   - Turn and reposition patient  - Assist with mobility/ambulation  - Relieve pressure over bony prominences  - Avoid friction and shearing  - Provide appropriate hygiene as needed including keeping skin clean and dry  - Evaluate need for skin moisturizer/barrier cream  - Collaborate with interdisciplinary team (i e  Nutrition, Rehabilitation, etc )   - Patient/family teaching   Outcome: Progressing      Problem: DISCHARGE PLANNING - CARE MANAGEMENT  Goal: Discharge to post-acute care or home with appropriate resources  INTERVENTIONS:  - Conduct assessment to determine patient/family and health care team treatment goals, and need for post-acute services based on payer coverage, community resources, and patient preferences, and barriers to discharge  - Address psychosocial, clinical, and financial barriers to discharge as identified in assessment in conjunction with the patient/family and health care team  - Arrange appropriate level of post-acute services according to patient's   needs and preference and payer coverage in collaboration with the physician and health care team  - Communicate with and update the patient/family, physician, and health care team regarding progress on the discharge plan  - Arrange appropriate transportation to post-acute venues   Outcome: Progressing      Problem: Nutrition/Hydration-ADULT  Goal: Nutrient/Hydration intake appropriate for improving, restoring or maintaining nutritional needs  Monitor and assess patient's nutrition/hydration status for malnutrition (ex- brittle hair, bruises, dry skin, pale skin and conjunctiva, muscle wasting, smooth red tongue, and disorientation)  Collaborate with interdisciplinary team and initiate plan and interventions as ordered  Monitor patient's weight and dietary intake as ordered or per policy  Utilize nutrition screening tool and intervene per policy  Determine patient's food preferences and provide high-protein, high-caloric foods as appropriate       INTERVENTIONS:  - Monitor oral intake, urinary output, labs, and treatment plans  - Assess nutrition and hydration status and recommend course of action  - Evaluate amount of meals eaten  - Assist patient with eating if necessary   - Allow adequate time for meals  - Recommend/ encourage appropriate diets, oral nutritional supplements, and vitamin/mineral supplements  - Order, calculate, and assess calorie counts as needed  - Recommend, monitor, and adjust tube feedings and TPN/PPN based on assessed needs  - Assess need for intravenous fluids  - Provide specific nutrition/hydration education as appropriate  - Include patient/family/caregiver in decisions related to nutrition   Outcome: Progressing      Problem: SAFETY,RESTRAINT: NV/NON-SELF DESTRUCTIVE BEHAVIOR  Goal: Remains free of harm/injury (restraint for non violent/non self-detsructive behavior)  INTERVENTIONS:  - Instruct patient/family regarding restraint use   - Assess and monitor physiologic and psychological status   - Provide interventions and comfort measures to meet assessed patient needs   - Identify and implement measures to help patient regain control  - Assess readiness for release of restraint    Outcome: Progressing    Goal: Returns to optimal restraint-free functioning  INTERVENTIONS:  - Assess the patient's behavior and symptoms that indicate continued need for restraint  - Identify and implement measures to help patient regain control  - Assess readiness for release of restraint    Outcome: Progressing      Problem: METABOLIC, FLUID AND ELECTROLYTES - ADULT  Goal: Electrolytes maintained within normal limits  INTERVENTIONS:  - Monitor labs and assess patient for signs and symptoms of electrolyte imbalances  - Administer electrolyte replacement as ordered  - Monitor response to electrolyte replacements, including repeat lab results as appropriate  - Instruct patient on fluid and nutrition as appropriate  Outcome: Progressing    Goal: Fluid balance maintained  INTERVENTIONS:  - Monitor labs and assess for signs and symptoms of volume excess or deficit  - Monitor I/O and WT  - Instruct patient on fluid and nutrition as appropriate  Outcome: Progressing    Goal: Glucose maintained within target range  INTERVENTIONS:  - Monitor Blood Glucose as ordered  - Assess for signs and symptoms of hyperglycemia and hypoglycemia  - Administer ordered medications to maintain glucose within target range  - Assess nutritional intake and initiate nutrition service referral as needed  Outcome: Progressing

## 2018-08-16 NOTE — ASSESSMENT & PLAN NOTE
TSH 3 28 (this admission)  - If pt took Levothyroxine as part of her overdose, it would take 3-7 days to develop thyrotoxicosis    - Will continue to monitor throughout pt's admission   - Continue PTA Levothyroxine 25 mcg daily

## 2018-08-16 NOTE — ASSESSMENT & PLAN NOTE
S/p Overdose :   - Appreciate Psychiatry input    - 201 signed --> pt will be admitted to inpatient psychiatry once medically clear   - 1:1 observation while on Med Surg    - Continue to hold PTA psychotropics (Geodon, Paxil, Seroquel)

## 2018-08-16 NOTE — SOCIAL WORK
Pt is awake and alert  CM met with her and introduced self  CM informed pt of referral made to APS and request for ROSETTE to release medical information  At this time Tiffany is refusing to sign ROSETTE for [employer] [facility]  As per pt she only wants CM to inform them she will not sign ROSETTE  CM left VM for Ashley  Pt reports she does help take care of her mother at home  CM informed pt that CM did make a referral to Aging protective services to check on her mother  CM followed up with [facility] area on aging - CM left VM for assigned care manager, Niharika Luke, who is assigned to pt mother  CM requested call back

## 2018-08-16 NOTE — PROGRESS NOTES
[facility]18 [facility]   Spiritual Beliefs/Perceptions   Concept of God Accepting   God's Role in Disease Natural   Stress Factors   Patient Stress Factors Health changes   Coping Responses   Patient Coping Open/discussion   Plan of Care   Comments PT is [employer] and appreciates prayers      Assessment Completed by: Unit visit

## 2018-08-16 NOTE — RESTORATIVE TECHNICIAN NOTE
Restorative Specialist Mobility Note       Activity: Ambulate in solorio, Chair     Assistive Device: Front wheel walker

## 2018-08-16 NOTE — PROGRESS NOTES
Progress Note - Critical Care   Raquel Gamez 62 y o  female MRN: [id number]  Unit/Bed#: MICU 01 Encounter: [id number]    Assessment:   Principal Problem (Resolved):    Status epilepticus ([address] )  Active Problems:    Serotonin syndrome    Hyponatremia    Delirium    Tachycardia    Hypocalcemia    Hypokalemia    Leukopenia  Resolved Problems:    Acute respiratory failure (HCC)    Metabolic acidosis, increased anion gap    Hyperthermia    Non-traumatic rhabdomyolysis    Hypophosphatemia    Seizure (HCC)    Overdose    Plan:     Neuro:   · Delirium Precautions: CAM ICU daily, regulate sleep/wake cycle, avoid benzos and opiates as able  · Trend neuro exam  · Serotonin syndrome: Valium 5mg Q4 PRN has used 2 doses in past 24 hours  · Psych eval pending    CV:   · Rhythm: Sinus Tachycardia  · Follow rhythm on telemetry  · Goal MAP >65    Lung:   · Encourage deep breathing, coughing  · Good room air saturation  GI:   · Continue PPI for GERD  · Continue bowel regimen    FEN:   · Nutrition/diet plan: Regular diet  · Replete electrolytes with goals: K >4 0, Mag >2 0, and Phos >3 0  · Fluid plan: Consider transitioning from 1/2 NS to isolyte or NS    :   · Has self removed rodrigez x 2  Attempt spontaneous voiding trial  · Trend UOP and BUN/creat    ID:   · 1/2 blood cultures positive for GPC likely a contaminant  Remains afebrile  · Trend temps and WBC count    Heme:   · Lovenox for DVT ppx  · Restart home iron for anemia  Endo:   · Levothyroxine 25mcg daily     MSK/Skin:  · Mobility goal: OOB to chair   · PT consult: yes  · OT consult: yes  · Frequent turning and pressure off-loading    Disposition:  Transfer to floor    ______________________________________________________________________    HPI/24hr events: Extubated yesterday    Self removed rodrigez x 2      ______________________________________________________________________  Physical Exam:  Banks Agitation Sedation Scale (RASS): Restless  Physical Exam Constitutional: She appears well-developed and well-nourished  No distress  HENT:   Head: Normocephalic and atraumatic  Eyes: Pupils are equal, round, and reactive to light  Neck: No JVD present  No tracheal deviation present  Cardiovascular: Normal rate, regular rhythm, normal heart sounds and intact distal pulses  Exam reveals no gallop and no friction rub  No murmur heard  Pulmonary/Chest: Effort normal and breath sounds normal  No respiratory distress  She has no wheezes  She has no rales  She exhibits no tenderness  Abdominal: Soft  Bowel sounds are normal  She exhibits no distension  There is no tenderness  Musculoskeletal:   Healing ecchymosis of bilateral knees    Neurological: She is alert  Oriented to person, place  For time was able to state president however thought it was , but did know it was august    Nonfocal exam  Follows 2 step commands briskly   Skin: Skin is warm and dry      ______________________________________________________________________  Temperature:   Temp (24hrs), Av 3 °F (36 8 °C), Min:96 8 °F (36 °C), Max:99 5 °F (37 5 °C)    Current Temperature: 97 9 °F (36 6 °C)    Vitals:    18 0400 18 0500 18 0551 18 0600   BP: 105/56 139/82  167/89   BP Location: Left arm Left arm  Left arm   Pulse: 86 102  96   Resp: 20 19  20   Temp: 97 9 °F (36 6 °C)      TempSrc: Probe      SpO2: 94% 97%  97%   Weight:   62 8 kg (138 lb 7 2 oz)    Height:                 Weights:   IBW: 59 3 kg    Body mass index is 22 35 kg/m²    Weight (last 2 days)     Date/Time   Weight    18 0551  62 8 (138 45)            Height: 5' 6" (167 6 cm)  Hemodynamic Monitoring:  N/A       Intake and Outputs:  I/O        07 - 08/15 0700 08/15 07 -  0700    I V  (mL/kg) 3456 4 (49) 3506 4 (55 8)    NG/     IV Piggyback 1250     Feedings 168 140    Total Intake(mL/kg) 5074 4 (71 9) 3646 4 (58 1)    Urine (mL/kg/hr) 2590 (1 5) 3500 (2 3)    Emesis/NG output 325     Total Output 2915 3500    Net +2159 4 +146 4            IN 3 7L  OUT 3 5L  NET +150cc  UOP: 200cc/hour     Nutrition:        Diet Orders            Start     Ordered    08/14/18 1712  Diet Enteral/Parenteral; Tube Feeding No Oral Diet; Jevity 1 2 Armando; Continuous; 10; 100; Water; Every 6 hours  Diet effective now     Comments:  Increase by 10-20 cc/hr every 4 hours to goal 40 cc/hr   Question Answer Comment   Diet Type Enteral/Parenteral    Enteral/Parenteral Tube Feeding No Oral Diet    Tube Feeding Formula: Jevity 1 2 Armando    Bolus/Cyclic/Continuous Continuous    Tube Feeding Continuous rate (mL/hr): 10    Tube Feeding water flush (mL): 100    Water Flush type: Water    Water flush frequency: Every 6 hours    RD to adjust diet per protocol?  Yes        08/14/18 1712          Labs:     Results from last 7 days  Lab Units 08/16/18  0338 08/15/18  0652 08/14/18  0930 08/14/18  0438 08/13/18  1707 08/13/18  0842 08/13/18  0718   WBC Thousand/uL 3 90* 4 14* 5 95 5 66  --   --  9 96   HEMOGLOBIN g/dL 10 7* 10 2* 13 7 12 3 14 0  --  13 5   I STAT HEMOGLOBIN g/dl  --   --   --   --   --  13 3  --    HEMATOCRIT % 31 0* 30 5* 39 9 34 7*  --   --  36 5   PLATELETS Thousands/uL 170 156 214 223  --   --  355   NEUTROS PCT % 72 75 76* 84*  --   --  87*   MONOS PCT % 11 12 12 8  --   --  5       Results from last 7 days  Lab Units 08/16/18  0338 08/15/18  2150 08/15/18  1705 08/15/18  1103 08/15/18  0442 08/15/18  0014 08/14/18  2033  08/13/18  0703   SODIUM mmol/L 130* 129* 127* 130* 128* 127* 126*  < > 112*   POTASSIUM mmol/L 3 3* 3 6 3 7 3 8 3 9 4 0 3 8  < > 3 7   CHLORIDE mmol/L 95* 96* 95* 99* 97* 97* 95*  < > 75*   CO2 mmol/L 25 26 25 24 22 22 24  < > 21   BUN mg/dL 2* 3* 3* 3* 4* 4* 5  < > 14   CREATININE mg/dL 0 35* 0 42* 0 41* 0 50* 0 55* 0 57* 0 53*  < > 0 85   CALCIUM mg/dL 7 9* 7 8* 8 0* 7 9* 8 0* 7 5* 7 5*  < > 8 7   ANION GAP mmol/L 10 7 7 7 9 8 7  < > 16*   ALBUMIN g/dL  --   --   --   --   --   -- --   --  4 1   BILIRUBIN TOTAL mg/dL  --   --   --   --   --   --   --   --  1 09*   ALK PHOS U/L  --   --   --   --   --   --   --   --  75   ALT U/L  --   --   --   --   --   --   --   --  34   AST U/L  --   --   --   --   --   --   --   --  120*   GLUCOSE RANDOM mg/dL 92 89 101 111 103 98 97  < > 109   GLUCOSE, ISTAT   --   --   --   --   --   --   --   < >  --    < > = values in this interval not displayed  Results from last 7 days  Lab Units 08/16/18  0338 08/15/18  0442 08/14/18  0438   MAGNESIUM mg/dL  --  2 2 2 0   PHOSPHORUS mg/dL  --  2 0* 1 6*   CALCIUM ION mmol/L 1 06*  --   --         Results from last 7 days  Lab Units 08/13/18  0718   INR  1 00   PTT seconds 27       Results from last 7 days  Lab Units 08/13/18  0703   TROPONIN I ng/mL <0 02       Results from last 7 days  Lab Units 08/13/18  1823 08/13/18  0829 08/13/18  0703   LACTIC ACID mmol/L 2 3* 8 1* 3 9*     ABG:  Lab Results   Component Value Date    PHART 7 556 (H) 08/14/2018    IDA6ROI 26 8 (L) 08/14/2018    PO2ART 197 2 (H) 08/14/2018    HJL7BQB 23 2 08/14/2018    BEART 2 1 08/14/2018    SOURCE Radial, Right 08/14/2018     VBG:  Results from last 7 days  Lab Units 08/14/18  0522   ABG SOURCE  Radial, Right       Imaging: No new   EKG: No new  Micro:  Blood Culture:   Lab Results   Component Value Date    BLOODCX No Growth at 48 hrs  08/13/2018     Urine Culture:   Lab Results   Component Value Date    URINECX 70,000-79,000 cfu/ml Mixed Contaminants X5 09/24/2016     Sputum Culture: No components found for: SPUTUMCX  Wound Culure: No results found for: [facility]     Lab Results   Component Value Date    BLOODCX No Growth at 48 hrs  08/13/2018    URINECX 70,000-79,000 cfu/ml Mixed Contaminants X5 09/24/2016       Allergies:    Allergies   Allergen Reactions    Latex     Risperdal [Risperidone]     Zyprexa [Olanzapine]      Medications:   Scheduled Meds:  Current Facility-Administered Medications:  acetaminophen 650 mg Oral Q4H PRN Emmie Bearden PA-C    chlorhexidine 15 mL [employer]it Q12H [address], [place]    dexmedetomidine 0 1-0 7 mcg/kg/hr Intravenous Titrated Vanessa Galdamez MD Last Rate: Stopped (08/15/18 1627)   diazepam 5 mg Intravenous Q4H PRN Vanessa Galdamez MD    enoxaparin 40 mg Subcutaneous Daily Emmie Bearden PA-C    levothyroxine 25 mcg Oral Early Morning Emmie Bearden PA-C    metoprolol 5 mg Intravenous Q6H PRN Emmie Bearden PA-C    propofol 5-50 mcg/kg/min Intravenous Titrated Emmie Bearden PA-C Last Rate: Stopped (08/15/18 0800)   senna-docusate sodium 1 tablet Oral HS Jaymie Allen PA-C    sodium chloride 100 mL/hr Intravenous Continuous Jaymie Allen PA-C Last Rate: 100 mL/hr (08/16/18 0604)     Continuous Infusions:  dexmedetomidine 0 1-0 7 mcg/kg/hr Last Rate: Stopped (08/15/18 1627)   propofol 5-50 mcg/kg/min Last Rate: Stopped (08/15/18 0800)   sodium chloride 100 mL/hr Last Rate: 100 mL/hr (08/16/18 0604)     PRN Meds:    acetaminophen 650 mg Q4H PRN   diazepam 5 mg Q4H PRN   metoprolol 5 mg Q6H PRN     VTE Pharmacologic Prophylaxis: Enoxaparin (Lovenox)  VTE Mechanical Prophylaxis: sequential compression device  Invasive lines and devices: Invasive Devices     Peripheral Intravenous Line            Peripheral IV 08/13/18 Right Forearm 2 days    Peripheral IV 08/15/18 Left Forearm less than 1 day                   Code Status: Level 1 - Full Code    Portions of the record may have been created with voice recognition software  Occasional wrong word or "sound a like" substitutions may have occurred due to the inherent limitations of voice recognition software  Read the chart carefully and recognize, using context, where substitutions have occurred      [employer], PA-C

## 2018-08-16 NOTE — ASSESSMENT & PLAN NOTE
- On presentation, Na 112 --> pt was given a bolus of hypertonic saline, and then transitioned to 1/2NS with slow correction of Na  - Today, Na 130 --> fluids discontinued, and pt placed on PO fluid restriction  - Continue to monitor with BMP q12h

## 2018-08-16 NOTE — ASSESSMENT & PLAN NOTE
Serotonin Syndrome: 2n2 overdose of psychotropics  * Patient reported taking 3 tabs of Geodon, an unknown number of Paxil/Seroquel   * S/p extubation 8/15 after being intubated s/p acute respiratory failure (8/13) for airway protection 2n2 seizure, AMS  - Continue on Tele, monitor vitals and electrolytes    On presentation ED 8/13:    Patient was febrile and tachycardic, AMS and had a witness seizure in the ED, for which she was intubated and sedated   -  Admitted to the ICU, and required titration of propofol as well as benzodiazepine administration    - Pt extubated 8/15   - Current vitals stable: T 98 4, Pulse 82, RR 20, /78, 98% room air    In past 24 hours,   - Vailum 5mg 0153  - Afebrile   - BP: max 174/93 0900: currently 119/78       Consult: Toxicology  - Appreciate Toxicology input   - Will continue tele to monitor for QTc prolongation/QRS widening   - Continue Benzo prn for autonomic instability     Medication:  Diazepam 5mg IV q4hrs prn: muscle spasm, seizure, agitation   Lopressor 5mg q6hrs prn: BP sys>160 ( goal 140-160)  Tylenol 650mg q4hrs prn

## 2018-08-16 NOTE — SOCIAL WORK
CM received a phone call from pt mother - Kate's care manager, Marko Number [phone], to update CM on referral placed for protective services  As per Marko Restrepo she has had 2 face to face encounters with pt mother and has set up [facility]  As per Marko Number [employer] will be delivering her a hot plate a day and a cold package  She reports they do not have deliveries on the weekend but Kate's has food to get her through [place]  She reports that she encouraged Darrel Donovan to contact [employer] to see if they are able to set up a grocery delivery service for her  Also she reports Darrel Donovan has enough medications for 2 weeks  She reports that Darrel Donovan is independent with ADLs but needs assistance with household chores (taking trash out and scooping liter box)  Marko Restrepo requested information of any neighbors that could help Darrel Donovan as well as pt dcp  Marko Number also reports she brought [employer] and liter  She reports that she did place Darrel Donovan on the waiting list for personal care aids but this will take time  CM spoke with pt regarding the above  Pt reports feeling relieved that her mother is being taken care of  She reports they have a Alicia Mura, that may be able to help her mother with the household chores  She reports the phone number can be found in a box on the table  She reports her mother will know what she is talking about  Pt gave CM permission to provide Marko Number with her dcp so they can better assist her mother  ROSETTE completed  CM informed Marko Number that pt has signed a 201 and will received IP MH tx  Marko Number is aware pt is not medically cleared for MH tx  Marko Restrepo requested CM contact her when pt is d/c as well as follow up from the IP [facility] facility so Marko Restrepo can best help pt mother  CM made OP CC referral for pt mother - Darrel Donovan  CM spoke with RM with OP CC who will have her assigned

## 2018-08-16 NOTE — CONSULTS
Consultation - 6 [facility] ANNAMARIA Plascencia 62 y o  female MRN: [id number]  Unit/Bed#: MICU 01 Encounter: [id number]      Chief Complaint:  I have been feeling very depressed for the last year and I took the medication to feel better    History of Present Illness   Physician Requesting Consult: Sola Holt MD  Reason for Consult / Principal Problem: Overdose of antipsychotic, clonus, major depressive disorder    Choco Monae is a 62 y o  female with history of major depressive disorder presented to the ED with changes in mental status and reports taking three 80 mg of prescribed Geodon  Pt reports that she took three times the prescribed amount because she was depressed and having difficulties increased responsibilities at home, she also took some tablets of Paxil when I had asked the question she states she does not remember how many tablets she took, she also states that she did not tell anyone that she took the pills but she thinks that her mother was the one  to call 911  She states that she had been more depressed for the last year because she and her mother has some difficulties and they are not getting along lately  She denies any prior suicidal attempt but she states that she have multiple inpatient psych admissions in the past last one 10 years ago  She follows with psychiatrist but she does not have a therapist   She denies any psychotic symptoms she denies any history of manic episodes  At this moment patient still depressed and despite that she regretted her overdose she  still at  very high risk  She feels hopeless and helpless, she have low energy, she had difficulty sleeping and poor appetite she also have anhedonia           Psychiatric Review Of Systems:  sleep: yes  appetite changes: yes  weight changes: no  energy/anergy: yes  interest/pleasure/anhedonia: yes  somatic symptoms: yes  anxiety/panic: yes  rody: no  guilty/hopeless: yes  self injurious behavior/risky behavior: yes    Historical Information   Past Psychiatric History:   She had major depressive disorder she had any inpatient psych admission in the past last one 10 years ago  Currently in treatment with a psychiatrist at [facility]  Past Suicide attempts:  Denies  Past Violent behavior:  None  Past Psychiatric medication trial: Paroxetine, quetiapine, ziprasidone, seroquel    Substance Abuse History:  She denies any drugs or alcohol history     I have assessed this patient for substance use within the past 12 months     History of IP/OP rehabilitation program:  None  Smoking history:  Never smoked  Family Psychiatric History:   She denies any    Social History  Education: post college graduate work or degree  Learning Disabilities: none  Marital history: single  Living arrangement, social support: lives with mother whom she takes care of  Occupational History:  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    Other Pertinent History: None    Traumatic History:   Abuse: NONE  Other Traumatic Events: None    Past Medical History:   Diagnosis Date    Anxiety     Back pain at L4-L5 level     Schreiber esophagus     Bulimia     Depression     Disease of thyroid gland     hypothyroid    GERD (gastroesophageal reflux disease)     Hyperlipidemia     Hypothyroidism     Leukopenia     Synovial cyst of lumbar spine     Vertigo     Weight gain        Medical Review Of Systems:  Review of Systems - Negative except shortness of breath, sinus tachycardia, severe depression, all other systems reviewed were negative    Meds/Allergies   current meds:   Current Facility-Administered Medications   Medication Dose Route Frequency    acetaminophen (TYLENOL) oral suspension 650 mg  650 mg Oral Q4H PRN    diazepam (VALIUM) injection 5 mg  5 mg Intravenous Q4H PRN    enoxaparin (LOVENOX) subcutaneous injection 40 mg  40 mg Subcutaneous Daily    ferrous sulfate tablet 325 mg  325 mg Oral BID    levothyroxine tablet 25 mcg  25 mcg Oral Early Morning    metoprolol (LOPRESSOR) injection 5 mg  5 mg Intravenous Q6H PRN    senna-docusate sodium (SENOKOT S) 8 6-50 mg per tablet 1 tablet  1 tablet Oral HS     Allergies   Allergen Reactions    Latex     Risperdal [Risperidone]     Zyprexa [Olanzapine]        Objective   Vital signs in last 24 hours:  Temp:  [96 8 °F (36 °C)-99 5 °F (37 5 °C)] 98 5 °F (36 9 °C)  HR:  [] 100  Resp:  [15-27] 26  BP: (105-177)/(54-94) 174/93      Intake/Output Summary (Last 24 hours) at 08/16/18 1056  Last data filed at 08/16/18 0800   Gross per 24 hour   Intake          3354 39 ml   Output             3175 ml   Net           179 39 ml       Mental Status Evaluation:  Appearance:  age appropriate   Behavior:  cooperative   Speech:  soft   Mood:  depressed   Affect:  constricted   Language: naming objects and repeating phrases   Thought Process:  goal directed   Associations: intact associations   Thought Content:  normal   Perceptual Disturbances: None   Risk Potential: Status post overdose   Sensorium:  person, place, time/date and situation   Memory:  recent and remote memory grossly intact   Cognition:  grossly intact   Consciousness:  alert and awake    Attention: attention span appeared shorter than expected for age   Intellect: within normal limits   Fund of Knowledge: awareness of current events: Fair, past history: Fair and vocabulary: Fair   Insight:  limited   Judgment: limited   Muscle Strength and Tone: Within normal limits   Gait/Station: S low walking with a walker   Motor Activity: no abnormal movements     Lab Results:    Lab Results   Component Value Date    WBC 3 90 (L) 08/16/2018    HGB 10 7 (L) 08/16/2018    HCT 31 0 (L) 08/16/2018    MCV 86 08/16/2018     08/16/2018     Lab Results   Component Value Date     (L) 08/16/2018    K 3 3 (L) 08/16/2018    CL 95 (L) 08/16/2018    CO2 25 08/16/2018    ANIONGAP 10 08/16/2018    BUN 2 (L) 08/16/2018 CREATININE 0 35 (L) 08/16/2018    GLUCOSE 92 08/16/2018    CALCIUM 7 9 (L) 08/16/2018     (H) 08/13/2018    ALT 34 08/13/2018    ALKPHOS 75 08/13/2018    PROT 7 4 08/13/2018    BILITOT 1 09 (H) 08/13/2018    EGFR 121 08/16/2018         Code Status: )Level 1 - Full Code    Assessment/Plan     Assessment:  Janny Garcia is a 62 y o  female with longstanding history of major depression presented to the hospital after a overdose with multiple medications  She had been feeling more depressed for the last year and became every day with more difficult to take care of  Herself and her mother  She related the overdose but she still has some suicidal thoughts   Diagnosis:  Major depressive disorder severe recurrent without psychotic features F33 2   Plan:   Continue medical management  Patient will need one-to-one observation when she go to the medical floor  Inpatient psych admission medically cleared and bed available patient is a 201  At this moment will no restart her psychotropic medication until she is more medically stable  Risks, benefits and possible side effects of Medications:   Risks, benefits, and possible side effects of medications explained to patient and patient verbalizes understanding             Elisa Sanchez MD

## 2018-08-17 LAB
ANION GAP SERPL CALCULATED.3IONS-SCNC: 10 MMOL/L (ref 4–13)
ANION GAP SERPL CALCULATED.3IONS-SCNC: 7 MMOL/L (ref 4–13)
ANION GAP SERPL CALCULATED.3IONS-SCNC: 8 MMOL/L (ref 4–13)
ANION GAP SERPL CALCULATED.3IONS-SCNC: 9 MMOL/L (ref 4–13)
BACTERIA BLD CULT: ABNORMAL
BASOPHILS # BLD AUTO: 0.02 THOUSANDS/ΜL (ref 0–0.1)
BASOPHILS NFR BLD AUTO: 1 % (ref 0–1)
BUN SERPL-MCNC: 6 MG/DL (ref 5–25)
BUN SERPL-MCNC: 7 MG/DL (ref 5–25)
BUN SERPL-MCNC: 9 MG/DL (ref 5–25)
BUN SERPL-MCNC: 9 MG/DL (ref 5–25)
CALCIUM SERPL-MCNC: 8.3 MG/DL (ref 8.3–10.1)
CALCIUM SERPL-MCNC: 8.6 MG/DL (ref 8.3–10.1)
CALCIUM SERPL-MCNC: 8.9 MG/DL (ref 8.3–10.1)
CALCIUM SERPL-MCNC: 9.3 MG/DL (ref 8.3–10.1)
CHLORIDE SERPL-SCNC: 94 MMOL/L (ref 100–108)
CHLORIDE SERPL-SCNC: 94 MMOL/L (ref 100–108)
CHLORIDE SERPL-SCNC: 98 MMOL/L (ref 100–108)
CHLORIDE SERPL-SCNC: 99 MMOL/L (ref 100–108)
CO2 SERPL-SCNC: 27 MMOL/L (ref 21–32)
CREAT SERPL-MCNC: 0.51 MG/DL (ref 0.6–1.3)
CREAT SERPL-MCNC: 0.51 MG/DL (ref 0.6–1.3)
CREAT SERPL-MCNC: 0.53 MG/DL (ref 0.6–1.3)
CREAT SERPL-MCNC: 0.58 MG/DL (ref 0.6–1.3)
EOSINOPHIL # BLD AUTO: 0.08 THOUSAND/ΜL (ref 0–0.61)
EOSINOPHIL NFR BLD AUTO: 3 % (ref 0–6)
ERYTHROCYTE [DISTWIDTH] IN BLOOD BY AUTOMATED COUNT: 13.6 % (ref 11.6–15.1)
GFR SERPL CREATININE-BSD FRML MDRD: 103 ML/MIN/1.73SQ M
GFR SERPL CREATININE-BSD FRML MDRD: 106 ML/MIN/1.73SQ M
GFR SERPL CREATININE-BSD FRML MDRD: 107 ML/MIN/1.73SQ M
GFR SERPL CREATININE-BSD FRML MDRD: 107 ML/MIN/1.73SQ M
GLUCOSE SERPL-MCNC: 100 MG/DL (ref 65–140)
GLUCOSE SERPL-MCNC: 124 MG/DL (ref 65–140)
GLUCOSE SERPL-MCNC: 87 MG/DL (ref 65–140)
GLUCOSE SERPL-MCNC: 90 MG/DL (ref 65–140)
GRAM STN SPEC: ABNORMAL
HCT VFR BLD AUTO: 29.8 % (ref 34.8–46.1)
HGB BLD-MCNC: 10.1 G/DL (ref 11.5–15.4)
IMM GRANULOCYTES # BLD AUTO: 0.03 THOUSAND/UL (ref 0–0.2)
IMM GRANULOCYTES NFR BLD AUTO: 1 % (ref 0–2)
LYMPHOCYTES # BLD AUTO: 0.42 THOUSANDS/ΜL (ref 0.6–4.47)
LYMPHOCYTES NFR BLD AUTO: 14 % (ref 14–44)
MAGNESIUM SERPL-MCNC: 1.8 MG/DL (ref 1.6–2.6)
MCH RBC QN AUTO: 29.4 PG (ref 26.8–34.3)
MCHC RBC AUTO-ENTMCNC: 33.9 G/DL (ref 31.4–37.4)
MCV RBC AUTO: 87 FL (ref 82–98)
MONOCYTES # BLD AUTO: 0.39 THOUSAND/ΜL (ref 0.17–1.22)
MONOCYTES NFR BLD AUTO: 13 % (ref 4–12)
NEUTROPHILS # BLD AUTO: 2.05 THOUSANDS/ΜL (ref 1.85–7.62)
NEUTS SEG NFR BLD AUTO: 68 % (ref 43–75)
NRBC BLD AUTO-RTO: 0 /100 WBCS
PHOSPHATE SERPL-MCNC: 2.6 MG/DL (ref 2.7–4.5)
PLATELET # BLD AUTO: 207 THOUSANDS/UL (ref 149–390)
PMV BLD AUTO: 8.8 FL (ref 8.9–12.7)
POTASSIUM SERPL-SCNC: 3.8 MMOL/L (ref 3.5–5.3)
POTASSIUM SERPL-SCNC: 3.9 MMOL/L (ref 3.5–5.3)
RBC # BLD AUTO: 3.44 MILLION/UL (ref 3.81–5.12)
SODIUM SERPL-SCNC: 130 MMOL/L (ref 136–145)
SODIUM SERPL-SCNC: 131 MMOL/L (ref 136–145)
SODIUM SERPL-SCNC: 133 MMOL/L (ref 136–145)
SODIUM SERPL-SCNC: 133 MMOL/L (ref 136–145)
WBC # BLD AUTO: 2.99 THOUSAND/UL (ref 4.31–10.16)

## 2018-08-17 PROCEDURE — 97163 PT EVAL HIGH COMPLEX 45 MIN: CPT

## 2018-08-17 PROCEDURE — 99232 SBSQ HOSP IP/OBS MODERATE 35: CPT | Performed by: FAMILY MEDICINE

## 2018-08-17 PROCEDURE — G8979 MOBILITY GOAL STATUS: HCPCS

## 2018-08-17 PROCEDURE — 83735 ASSAY OF MAGNESIUM: CPT | Performed by: FAMILY MEDICINE

## 2018-08-17 PROCEDURE — 97167 OT EVAL HIGH COMPLEX 60 MIN: CPT

## 2018-08-17 PROCEDURE — G8987 SELF CARE CURRENT STATUS: HCPCS

## 2018-08-17 PROCEDURE — G8978 MOBILITY CURRENT STATUS: HCPCS

## 2018-08-17 PROCEDURE — 80048 BASIC METABOLIC PNL TOTAL CA: CPT | Performed by: FAMILY MEDICINE

## 2018-08-17 PROCEDURE — 99232 SBSQ HOSP IP/OBS MODERATE 35: CPT | Performed by: PSYCHIATRY & NEUROLOGY

## 2018-08-17 PROCEDURE — 85025 COMPLETE CBC W/AUTO DIFF WBC: CPT | Performed by: PHYSICIAN ASSISTANT

## 2018-08-17 PROCEDURE — G8988 SELF CARE GOAL STATUS: HCPCS

## 2018-08-17 RX ORDER — DOCUSATE SODIUM 100 MG/1
100 CAPSULE, LIQUID FILLED ORAL 2 TIMES DAILY
Status: DISCONTINUED | OUTPATIENT
Start: 2018-08-17 | End: 2018-08-20 | Stop reason: HOSPADM

## 2018-08-17 RX ORDER — CALCIUM CARBONATE 200(500)MG
500 TABLET,CHEWABLE ORAL DAILY PRN
Status: DISCONTINUED | OUTPATIENT
Start: 2018-08-17 | End: 2018-08-20 | Stop reason: HOSPADM

## 2018-08-17 RX ADMIN — LEVOTHYROXINE SODIUM 25 MCG: 25 TABLET ORAL at 05:07

## 2018-08-17 RX ADMIN — PANTOPRAZOLE SODIUM 20 MG: 20 TABLET, DELAYED RELEASE ORAL at 22:24

## 2018-08-17 RX ADMIN — POTASSIUM CHLORIDE 100 ML/HR: 2 INJECTION, SOLUTION, CONCENTRATE INTRAVENOUS at 00:33

## 2018-08-17 RX ADMIN — Medication 325 MG: at 18:25

## 2018-08-17 RX ADMIN — ENOXAPARIN SODIUM 40 MG: 40 INJECTION SUBCUTANEOUS at 09:56

## 2018-08-17 RX ADMIN — DOCUSATE SODIUM 100 MG: 100 CAPSULE, LIQUID FILLED ORAL at 09:56

## 2018-08-17 RX ADMIN — DOCUSATE SODIUM 100 MG: 100 CAPSULE, LIQUID FILLED ORAL at 18:26

## 2018-08-17 RX ADMIN — SENNOSIDES AND DOCUSATE SODIUM 1 TABLET: 8.6; 5 TABLET ORAL at 22:24

## 2018-08-17 RX ADMIN — POTASSIUM CHLORIDE 30 ML/HR: 2 INJECTION, SOLUTION, CONCENTRATE INTRAVENOUS at 21:27

## 2018-08-17 RX ADMIN — Medication 325 MG: at 09:56

## 2018-08-17 RX ADMIN — Medication 500 MG: at 12:51

## 2018-08-17 NOTE — OCCUPATIONAL THERAPY NOTE
OccupationalTherapy Evaluation     Patient Name: Janny Garcia  TYWWN'C Date: 8/17/2018  Problem List  Patient Active Problem List   Diagnosis    Hyponatremia    Major depressive disorder    Lumbar radiculopathy    DDD (degenerative disc disease), lumbar    Spondylolisthesis at L4-L5 level    Localized osteoporosis without current pathological fracture    Cough    Spinal stenosis of lumbar region with neurogenic claudication    Lumbar spondylosis    T12 compression fracture (HCC)    Synovial cyst of lumbar facet joint    Anxiety    Bulimia nervosa    Constipation    Esophageal reflux    Hyperlipidemia    Hypothyroidism    Schizoaffective disorder, depressive type (HCC)    Other fatigue    Serotonin syndrome    Delirium    Tachycardia    Rhabdomyolysis    Hypophosphatemia    Hypocalcemia    Hypokalemia    Leukopenia    Major depressive disorder, recurrent severe without psychotic features ([facility] [address] )     Past Medical History  Past Medical History:   Diagnosis Date    Anxiety     Back pain at L4-L5 level     Schreiber esophagus     Bulimia     Depression     Disease of thyroid gland     hypothyroid    GERD (gastroesophageal reflux disease)     Hyperlipidemia     Hypothyroidism     Leukopenia     Synovial cyst of lumbar spine     Vertigo     Weight gain      Past Surgical History  Past Surgical History:   Procedure Laterality Date    BONE MARROW BIOPSY      BREAST SURGERY      enlargement     RHINOPLASTY        08/17/18 0846   Note Type   Note type Eval/Treat   Restrictions/Precautions   Weight Bearing Precautions Per Order No   Other Precautions 1:1;Cognitive; Chair Alarm; Bed Alarm; Fall Risk   Pain Assessment   Pain Assessment No/denies pain   Home Living   Type of [address] [place] [address] One level;Stairs to enter with rails   Prior Function   Level of [place] Independent with ADLs and functional mobility   Lives With Family  (MOTHER - NOT IN GOOD HEALTH PER PT) Receives Help From Family   ADL Assistance Independent   IADLs Independent   Falls in the last 6 months 0   Vocational On disability   Lifestyle   Autonomy I ADLS AND MOBILITY - I IADLS    Reciprocal Relationships SUPPORTIVE FAMILY/FRIENDS   Service to Others REPORTS SHE HELPS TO CARE FOR MOTHER   Intrinsic Gratification ACTIVE PTA   Subjective   Subjective OFFERS NO C/O    ADL   Eating Assistance 7  Independent   Grooming Assistance 5  Supervision/Setup   UB Bathing Assistance 5  Supervision/Setup   LB Bathing Assistance 4  Minimal Assistance   UB Dressing Assistance 5  Supervision/Setup   LB Dressing Assistance 4  Minimal Assistance   Toileting Assistance  4  Minimal Assistance   Bed Mobility   Supine to Sit 4  Minimal assistance   Transfers   Sit to Stand 4  Minimal assistance   Stand to Sit 4  Minimal assistance   Stand pivot 4  Minimal assistance   Functional Mobility   Functional Mobility 4  Minimal assistance   Additional items Rolling walker   Balance   Static Sitting Good   Dynamic Sitting Fair +   Static Standing Fair   Dynamic Standing Fair -   Ambulatory Fair -   Activity Tolerance   Activity Tolerance Patient limited by fatigue;Treatment limited secondary to medical complications (Comment)   RUE Assessment   RUE Assessment WFL   LUE Assessment   LUE Assessment WFL   Cognition   Arousal/Participation Alert   Attention Attends with cues to redirect   Orientation Level Oriented X4   Memory Decreased recall of precautions   Following Commands Follows multistep commands with increased time or repetition   Assessment   Limitation Decreased ADL status; Decreased Safe judgement during ADL;Decreased endurance;Decreased self-care trans;Decreased high-level ADLs   Prognosis Good   Assessment Pt is a 62 y o  female who was admitted to [facility] on 8/13/2018 with Status epilepticus ([facility] [address] ) after admittedly taking extra medications   Pt's problem list also includes PMH of major depressive disorder, lumbar radiculopathy, osteoporosis, T12 compression fx, rhabdo, schizoaffective d/o, delerium, anxiety, bulemia, hld, hypothryoid  At baseline pt was completing adls and mobility independently  Pt lives with mother in 1 story home   Currently pt requires min assist for overall ADLS and min assist for functional mobility/transfers  Pt currently presents with impairments in the following categories -steps to enter environment, limited home support, limited insight into deficits, compliance, flat affect, decreased initiation and engagement  and health management  activity tolerance, endurance, standing balance/tolerance, insight, safety  and judgement   These impairments, as well as pt's fatigue, decreased caregiver support and risk for falls  limit pt's ability to safely engage in all baseline areas of occupation, includingbathing, dressing, toileting, functional mobility/transfers, community mobility, house maintenance, medication management, meal prep, cleaning, social participation  and leisure activities  From OT standpoint, recommend inpt psych upon D/C  OT will continue to follow to address the below stated goals  Goals   Patient Goals walk more   LTG Time Frame 10-14   Long Term Goal #1 refer to established goals below   Plan   Treatment Interventions ADL retraining;Functional transfer training; Endurance training;Cognitive reorientation;Patient/family training;Equipment evaluation/education; Compensatory technique education; Activityengagement   Goal Expiration Date 08/31/18   Treatment Day 0   OT Frequency 3-5x/wk   Recommendation   OT Discharge Recommendation (inpt psych)   Barthel Index   Feeding 10   Bathing 0   Grooming Score 5   Dressing Score 5   Bladder Score 10   Bowels Score 10   Toilet Use Score 5   Transfers (Bed/Chair) Score 10   Mobility (Level Surface) Score 0   Stairs Score 0   Barthel Index Score 55       OCCUPATIONAL THERAPY GOALS:    *MOD I ADLS AFTER SETUP WITH USE OF ADAPTIVE DEVICES PRN  *MOD I TOILETING AND CLOTHING MANAGEMENT   *MOD I FUNCTIONAL MOB AND TRANSFERS TO ALL SURFACES WITH FAIR+ TO GOOD BALANCE/SAFETY FOR PARTICIPATION IN DYNAMIC ADLS   *DEMONSTRATE GOOD CARRYOVER WITH SAFE USE OF RW DURING FUNCTIONAL TASKS  *ASSESS DME NEEDS  *INCREASE ACTIVITY TOLERANCE TO 40-45 MIN FOR PARTICIPATION IN ADLS AND ENJOYABLE ACTIVITIES     *PT TO PARTICIPATE IN ONGOING FUNCTIONAL COGNITIVE ASSESSMENT WITH GOOD ATTENTION/PARTICIPATION TO ASSIST WITH SAFE D/C RECOMMENDATIONS     Larissa Waddell, OT

## 2018-08-17 NOTE — PLAN OF CARE
Problem: OCCUPATIONAL THERAPY ADULT  Goal: Performs self-care activities at highest level of function for planned discharge setting  See evaluation for individualized goals  Treatment Interventions: ADL retraining, Functional transfer training, Endurance training, Cognitive reorientation, Patient/family training, Equipment evaluation/education, Compensatory technique education, Activityengagement          See flowsheet documentation for full assessment, interventions and recommendations  Limitation: Decreased ADL status, Decreased Safe judgement during ADL, Decreased endurance, Decreased self-care trans, Decreased high-level ADLs  Prognosis: Good  Assessment: Pt is a 62 y o  female who was admitted to [facility] on 8/13/2018 with Status epilepticus ([facility] [address] ) after admittedly taking extra medications  Pt's problem list also includes PMH of major depressive disorder, lumbar radiculopathy, osteoporosis, T12 compression fx, rhabdo, schizoaffective d/o, delerium, anxiety, bulemia, hld, hypothryoid  At baseline pt was completing adls and mobility independently  Pt lives with mother in 1 story home   Currently pt requires min assist for overall ADLS and min assist for functional mobility/transfers  Pt currently presents with impairments in the following categories -steps to enter environment, limited home support, limited insight into deficits, compliance, flat affect, decreased initiation and engagement  and health management  activity tolerance, endurance, standing balance/tolerance, insight, safety  and judgement    These impairments, as well as pt's fatigue, decreased caregiver support and risk for falls  limit pt's ability to safely engage in all baseline areas of occupation, includingbathing, dressing, toileting, functional mobility/transfers, community mobility, house maintenance, medication management, meal prep, cleaning, social participation  and leisure activities  From OT standpoint, recommend inpt psych upon D/C  OT will continue to follow to address the below stated goals        OT Discharge Recommendation:  (inpt psych)

## 2018-08-17 NOTE — ASSESSMENT & PLAN NOTE
WBC 9 9 on admission --> trending down throughout admission to 2 99 today ([address] WNL)  - Likely secondary to both fluid dilution as well as pt's baseline WBC on the low end (previous WBC noted to be 2-4 after resolution of illness on previous admission in 2016)  - Continue to monitor

## 2018-08-17 NOTE — ASSESSMENT & PLAN NOTE
Secondary to overdose of psychotropics --> pt reported taking 3 tabs of Geodon and an unknown number of Paxil/Seroquel     On presentation to ED 8/13 pt was altered, febrile, and tachycardic; she had a witnessed seizure in the ED, for which she was intubated and sedated   She was initially admitted to ICU, and managed with Propofol titration and benzodiazepines  - S/p extubation on 8/15    - Appreciate Toxicology input    - Continue Valium 5 mg q4h PRN for spasm, seizure, agitation --> pt has not required a dose since 0200 on 8/16

## 2018-08-17 NOTE — ASSESSMENT & PLAN NOTE
Rhabdomyolysis: 2n2 serotonin syndrome/ seizure : improvement  Total CK: 5,732--> 14,666--> 05817--> ( 8/14): 9617

## 2018-08-17 NOTE — ASSESSMENT & PLAN NOTE
Hypokalemia:   K:3 8--> 3 7--> 3 6 --> 3 3 : repleted this am  - Continue replete; goal  K> 4 0  - Continue BMP q12

## 2018-08-17 NOTE — ASSESSMENT & PLAN NOTE
Likely secondary to Serotonin Syndrome and associated seizure    Total CK: 5,732-->14,666-->80520-->6559 (8/14)  - Improved, s/p fluid hydration   - No longer monitoring given >50% decrease from peak level   - Pt continues to receive fluid hydration and kidney function stable --> no concern for worsening at this time

## 2018-08-17 NOTE — PROGRESS NOTES
Accepting :Transfer Note - Aleah Espinoza 1960, 62 y o  female MRN: [id number]    [facility]: [id number]    Primary Care Provider: Blanca Genao MD   Date and time admitted to hospital: 8/13/2018  6:51 AM    Assessment / Plan :     Patient is a 62 y o  female with PMhx anxiety, Bulimia nervosa, major depression disorder with psychotic features, schizoaffective disorder,  HLD, Hypothyroidism, Schreiber's esophagus, chronic low back pain 2n2 degenerative disc disease/ with lumbar radiculopathy  Patient was [facility] to [facility]  AMS/ tachycardia/ tachypnea/ seizures: Dx of Serotonin Syndrome 2n2 overdose  ( Geodon/Paxil/Seroquel; admit to excess Geodon intake) On Admission 8/13 patient  intubated for acute respiratory failure and status epilepticus  Toxicology and psych were consulted and helped with medical management  She was extubated 8/15  Patient signed 12; and will be taken to inpatient psych  Clinically improving, oriented to person/place and confused of time  Continue to monitor vitals, BMP w12s  Current  Vitals are stable       Serotonin syndrome   Assessment & Plan    Serotonin Syndrome: 2n2 overdose of psychotropics  * Patient reported taking 3 tabs of Geodon, an unknown number of Paxil/Seroquel   * S/p extubation 8/15 after being intubated s/p acute respiratory failure (8/13) for airway protection 2n2 seizure, AMS  - Continue on Tele, monitor vitals and electrolytes    On presentation ED 8/13:    Patient was febrile and tachycardic, AMS and had a witness seizure in the ED, for which she was intubated and sedated   -  Admitted to the ICU, and required titration of propofol as well as benzodiazepine administration    - Pt extubated 8/15   - Current vitals stable: T 98 4, Pulse 82, RR 20, /78, 98% room air    In past 24 hours,   - Vailum 5mg 0153  - Afebrile   - BP: max 174/93 0900: currently 119/78       Consult: Toxicology  - Appreciate Toxicology input   - Will continue tele to monitor for QTc prolongation/QRS widening   - Continue Benzo prn for autonomic instability     Medication:  Diazepam 5mg IV q4hrs prn: muscle spasm, seizure, agitation   Lopressor 5mg q6hrs prn: BP sys>160 ( goal 140-160)  Tylenol 650mg q4hrs prn        Major depressive disorder, recurrent severe without psychotic features ([facility] [address] )   Assessment & Plan    S/p Overdose :   - Appreciate Psychiatry input    - 201 signed --> pt will be admitted to inpatient psychiatry once medically clear   - 1:1 observation while on Med Surg    - Continue to hold PTA psychotropics (Geodon, Paxil, Seroquel)        Hypokalemia   Assessment & Plan    Hypokalemia:   K:3 8--> 3 7--> 3 6 --> 3 3 : repleted this am  - Continue replete; goal  K> 4 0  - Continue BMP q12        Hypocalcemia   Assessment & Plan    Hypocalcemia:   Ca: 7 8--> 7 9: - repleted 8/16: calcium IV 2mg  Ionized Ca: 1 06          Rhabdomyolysis   Assessment & Plan     Rhabdomyolysis: 2n2 serotonin syndrome/ seizure : improvement  Total CK: 5,732--> 14,666--> 00397--> ( 8/14): 6559         Tachycardia   Assessment & Plan    Tachycardia: 2n2 serotonin syndrome   last 24hrs max 104: Currently 82  Continue to monitor        Hypothyroidism   Assessment & Plan    TSH 3 28 (this admission)  - If pt took Levothyroxine as part of her overdose, it would take 3-7 days to develop thyrotoxicosis    - Will continue to monitor throughout pt's admission   - Continue PTA Levothyroxine 25 mcg daily         Hyponatremia   Assessment & Plan    - On presentation, Na 112 --> pt was given a bolus of hypertonic saline, and then transitioned to 1/2NS with slow correction of Na  - Today, Na 130 --> 127--> 129--> 125   - Started  IVF NS -KCl 30-Phos @100ml/hr   - Continue to monitor with BMP q12h             Prophylaxis  -GI: Senokot HS  -VTE Pharmacologic Prophylaxis: Enoxaparin (Lovenox)  -VTE Mechanical Prophylaxis: sequential compression device      Disposition  - Code: Full  - Diet: Heart healthy  - PCP: Dr Schmitt Fearing: [facility]  - Ambulation:   - Discharge: Continue tele, monitor vitals, electrolytes  Place 1:1 ; has 201 signed  Will be transferred to inpatient psych when medically cleared  Plan D/W Dr Oksana Johnson and [facility] Team  We appreciate the input from the consulting teams and case management     __________________________________________________________________________________________    CC:   History of Present Illness   Patient is a 62 y o  female with PMhx anxiety, Bulimia nervosa, major depression disorder with psychotic features, schizoaffective disorder,  HLD, Hypothyroidism, Schreiber's esophagus, chronic low back pain 2n2 degenerative disc disease/ with lumbar radiculopathy  Patient was admitted to [facility]  AMS/ tachycardia/ tachypnea/ seizures: Dx of Serotonin Syndrome 2n2 overdose  ( Geodon/Paxil/Seroquel; admit to excess Geodon intake)  Patient's mother  Called EMS 8/13 when she noticed some agitation and changes in mental status  Patient had told mother she had taken extra doses of Geodon that morning  EMS as noted in H&P:  Patient was significantly altered, non cohesive, diaphoretic, tachycardiac    ED SLB: Temp 100 8, tachycardic, witnessed seizure  Was sedated, intubated for airway protection and placed on propofol gtt  However still required x2 diazepam for continued seizure activity  Toxicology and psych was consulted  EEg: neg  Medical treatment for acute respiratory failure 2n2 serotonin syndrome ( overdose): rhabdomyolysis, hyponatremia, hypokalemia, hypocalcemia  Extubated: 8/15  Patient signed 12; and will be taken to inpatient psych  Place 1:1        Review of Systems   Constitutional: Negative for fatigue and fever  HENT: Positive for sore throat  Eyes: Negative for visual disturbance  Respiratory: Negative for apnea, cough, choking, chest tightness, shortness of breath, wheezing and stridor  Cardiovascular: Negative for chest pain, palpitations and leg swelling  Gastrointestinal: Negative for abdominal pain, constipation, diarrhea, nausea and vomiting  Genitourinary: Negative for difficulty urinating  Musculoskeletal: Negative for arthralgias, back pain, neck pain and neck stiffness  Skin: Negative for rash  Allergic/Immunologic: Negative for immunocompromised state  Neurological: Negative for dizziness, tremors, seizures, syncope, facial asymmetry, speech difficulty, weakness, light-headedness, numbness and headaches  Psychiatric/Behavioral: Negative for behavioral problems, confusion, hallucinations, sleep disturbance and suicidal ideas  The patient is not nervous/anxious and is not hyperactive  Historical Information   Past Medical History:   Diagnosis Date    Anxiety     Back pain at L4-L5 level     Schreiber esophagus     Bulimia     Depression     Disease of thyroid gland     hypothyroid    GERD (gastroesophageal reflux disease)     Hyperlipidemia     Hypothyroidism     Leukopenia     Synovial cyst of lumbar spine     Vertigo     Weight gain      Past Surgical History:   Procedure Laterality Date    BONE MARROW BIOPSY      BREAST SURGERY      enlargement     RHINOPLASTY       Social History   History   Alcohol Use No     History   Drug Use No     History   Smoking Status    Never Smoker   Smokeless Tobacco    Never Used     Family History:   non-contributory    Meds/Allergies   all medications and allergies reviewed  Allergies   Allergen Reactions    Latex     Risperdal [Risperidone]     Zyprexa [Olanzapine]        Objective   Vitals: Blood pressure 119/78, pulse 82, temperature 98 4 °F (36 9 °C), temperature source Oral, resp  rate 20, height 5' 6" (1 676 m), weight 62 8 kg (138 lb 7 2 oz), SpO2 98 %    Vitals:    08/16/18 1400 08/16/18 1600 08/16/18 1700 08/16/18 1800   BP: (!) 155/111 143/84 167/96 119/78   BP Location:       Pulse: 92 92 96 82   Resp: (!) 28 22 18 20   Temp:       TempSrc:       SpO2: 98% 97% 96% 98% Weight:       Height:             Intake/Output Summary (Last 24 hours) at 08/16/18 2103  Last data filed at 08/16/18 1301   Gross per 24 hour   Intake             2195 ml   Output             3350 ml   Net            -1155 ml       Invasive Devices     Peripheral Intravenous Line            Peripheral IV 08/13/18 Right Forearm 3 days    Peripheral IV 08/15/18 Left Forearm 1 day                Physical Exam   Constitutional: She is oriented to person, place, and time  She appears well-developed and well-nourished  No distress  HENT:   Head: Normocephalic  Eyes: Pupils are equal, round, and reactive to light  Neck: Normal range of motion  Cardiovascular: Normal rate  Pulmonary/Chest:   B/L Lower lobes: course breathe sounds  B/L :Clear air entry    Abdominal: Soft  She exhibits no distension and no mass  There is no tenderness  There is no rebound and no guarding  Musculoskeletal: Normal range of motion  UE ( hands): mild edema: L>R   Neurological: She is alert and oriented to person, place, and time  She has normal strength  No cranial nerve deficit or sensory deficit  Skin: Skin is dry  No rash noted  She is not diaphoretic  No erythema  There is pallor  Psychiatric: She has a normal mood and affect  Her behavior is normal  Her mood appears not anxious  Her affect is not angry, not blunt, not labile and not inappropriate  Her speech is delayed  Her speech is not rapid and/or pressured, not tangential and not slurred  She does not exhibit a depressed mood  She is communicative  Lab Results:   I have personally reviewed pertinent reports        Recent Results (from the past 24 hour(s))   Basic metabolic panel    Collection Time: 08/15/18  9:50 PM   Result Value Ref Range    Sodium 129 (L) 136 - 145 mmol/L    Potassium 3 6 3 5 - 5 3 mmol/L    Chloride 96 (L) 100 - 108 mmol/L    CO2 26 21 - 32 mmol/L    Anion Gap 7 4 - 13 mmol/L    BUN 3 (L) 5 - 25 mg/dL    Creatinine 0 42 (L) 0 60 - 1 30 mg/dL    Glucose 89 65 - 140 mg/dL    Calcium 7 8 (L) 8 3 - 10 1 mg/dL    eGFR 114 ml/min/1 73sq m   Fingerstick Glucose (POCT)    Collection Time: 08/15/18 11:46 PM   Result Value Ref Range    POC Glucose 88 65 - 140 mg/dl   Basic metabolic panel    Collection Time: 08/16/18  3:38 AM   Result Value Ref Range    Sodium 130 (L) 136 - 145 mmol/L    Potassium 3 3 (L) 3 5 - 5 3 mmol/L    Chloride 95 (L) 100 - 108 mmol/L    CO2 25 21 - 32 mmol/L    Anion Gap 10 4 - 13 mmol/L    BUN 2 (L) 5 - 25 mg/dL    Creatinine 0 35 (L) 0 60 - 1 30 mg/dL    Glucose 92 65 - 140 mg/dL    Calcium 7 9 (L) 8 3 - 10 1 mg/dL    eGFR 121 ml/min/1 73sq m   CBC and differential    Collection Time: 08/16/18  3:38 AM   Result Value Ref Range    WBC 3 90 (L) 4 31 - 10 16 Thousand/uL    RBC 3 61 (L) 3 81 - 5 12 Million/uL    Hemoglobin 10 7 (L) 11 5 - 15 4 g/dL    Hematocrit 31 0 (L) 34 8 - 46 1 %    MCV 86 82 - 98 fL    MCH 29 6 26 8 - 34 3 pg    MCHC 34 5 31 4 - 37 4 g/dL    RDW 13 5 11 6 - 15 1 %    MPV 8 8 (L) 8 9 - 12 7 fL    Platelets 634 549 - 375 Thousands/uL    nRBC 0 /100 WBCs    Neutrophils Relative 72 43 - 75 %    Immat GRANS % 1 0 - 2 %    Lymphocytes Relative 13 (L) 14 - 44 %    Monocytes Relative 11 4 - 12 %    Eosinophils Relative 2 0 - 6 %    Basophils Relative 1 0 - 1 %    Neutrophils Absolute 2 88 1 85 - 7 62 Thousands/µL    Immature Grans Absolute 0 02 0 00 - 0 20 Thousand/uL    Lymphocytes Absolute 0 49 (L) 0 60 - 4 47 Thousands/µL    Monocytes Absolute 0 41 0 17 - 1 22 Thousand/µL    Eosinophils Absolute 0 08 0 00 - 0 61 Thousand/µL    Basophils Absolute 0 02 0 00 - 0 10 Thousands/µL   Calcium, ionized    Collection Time: 08/16/18  3:38 AM   Result Value Ref Range    Calcium, Ionized 1 06 (L) 1 12 - 1 32 mmol/L   Fingerstick Glucose (POCT)    Collection Time: 08/16/18  5:52 PM   Result Value Ref Range    POC Glucose 98 65 - 140 mg/dl     Blood Culture:   Lab Results   Component Value Date    BLOODCX No Growth at 72 hrs  08/13/2018   ,   Urinalysis: Lab Results   Component Value Date    COLORU Yellow 08/13/2018    CLARITYU Clear 08/13/2018    SPECGRAV 1 025 08/13/2018    PHUR 5 5 08/13/2018    LEUKOCYTESUR Negative 08/13/2018    NITRITE Negative 08/13/2018    PROTEINUA 30 (1+) (A) 08/13/2018    GLUCOSEU Negative 08/13/2018    KETONESU 15 (1+) (A) 08/13/2018    BILIRUBINUR Negative 08/13/2018    BLOODU Moderate (A) 08/13/2018   ,   Urine Culture:   Lab Results   Component Value Date    URINECX 70,000-79,000 cfu/ml Mixed Contaminants X5 09/24/2016   ,   Wound Culure: No results found for: WOUNDCULT    Imaging:   EKG, Pathology, and Other Studies:   I have personally reviewed pertinent reports            Abdoulaye Lamb MD PGY-3   Family Medicine

## 2018-08-17 NOTE — PLAN OF CARE
Problem: PHYSICAL THERAPY ADULT  Goal: Performs mobility at highest level of function for planned discharge setting  See evaluation for individualized goals  Treatment/Interventions: Functional transfer training, LE strengthening/ROM, Therapeutic exercise, Endurance training, Patient/family training, Equipment eval/education, Bed mobility, Gait training  Equipment Recommended: Anson Caballero       See flowsheet documentation for full assessment, interventions and recommendations  Prognosis: Good  Problem List: Decreased strength, Decreased endurance, Impaired balance, Decreased mobility, Decreased coordination  Assessment: Pt seen for high complexity physical therapy evaluation  Pt is a 63 y/o female w/ history/comorbidities of depression, HTN, HLD who is now admitted w/ change in MS, confusion, sz activity p apparent OD on Paxil  Intubated on admit, and w/ sz activity  Dx is seratonin syndrome  Undergoing w/u, and on 1:1 presently  Due to need for 1:1, fall risk, likely need for IP psych admit, note unstable clinical picture  PT consulted to assess mobility, d/c needs  Pt presents w/ decreased functional mob, standing balance, endurance, B LE strength, barriers at home  will benefit from skileld PT to correct for the above problems  Plan is for IP psych at d/c, then norris  Agree w  same        Recommendation:  (plan is for d/c to IP psych, then home)     PT - OK to Discharge: (S) Yes (see above)    See flowsheet documentation for full assessment

## 2018-08-17 NOTE — SOCIAL WORK
Met with Kate Rodriguez with adult protective services [phone] who also met with pt  Ashley requested clinicals--pt signed release  Clinicals provided  Ashley requested to be updated regarding pt's dc date and location

## 2018-08-17 NOTE — ASSESSMENT & PLAN NOTE
2 0 (8/15)-->2 6 today   - Pt restarted on NS w/ 20 mmol Phos   - Continue to monitor and replete as needed for goal >3

## 2018-08-17 NOTE — RESTORATIVE TECHNICIAN NOTE
Restorative Specialist Mobility Note       Activity: Ambulate in solorio, Ambulate in room, Bathroom privileges, Chair, Stand at bedside (Educated/encouraged pt to ambulate with assistance 3-4 x's/day   Pt callbell, phone/tray within reach )     Assistive Device: None       Merlyn DACOSTA, Restorative Technician, [employer] [facility]

## 2018-08-17 NOTE — ASSESSMENT & PLAN NOTE
K 3 3-->3 7 yesterday  - Pt restarted on NS w/ 30 mEq K overnight   - AM BMP pending   - Continue to monitor with q12h BMP and replete as needed for goal >4

## 2018-08-17 NOTE — PLAN OF CARE
DISCHARGE PLANNING - CARE MANAGEMENT     Discharge to post-acute care or home with appropriate resources Progressing        METABOLIC, FLUID AND ELECTROLYTES - ADULT     Electrolytes maintained within normal limits Progressing     Fluid balance maintained Progressing     Glucose maintained within target range Progressing        Nutrition/Hydration-ADULT     Nutrient/Hydration intake appropriate for improving, restoring or maintaining nutritional needs Progressing        Potential for Falls     Patient will remain free of falls Progressing        Prexisting or High Potential for Compromised Skin Integrity     Skin integrity is maintained or improved Progressing

## 2018-08-17 NOTE — PHYSICAL THERAPY NOTE
Physical Therapy Evaluation    Patient's Name: Damián Kilgore    Admitting Diagnosis  Clonus [R25 8]  Hyponatremia [E87 1]  Overdose [T50 901A]  Seizure ([facility] [address] ) [R56 9]  Overdose of antipsychotic [T43 501A]    Problem List  Patient Active Problem List   Diagnosis    Hyponatremia    Major depressive disorder    Lumbar radiculopathy    DDD (degenerative disc disease), lumbar    Spondylolisthesis at L4-L5 level    Localized osteoporosis without current pathological fracture    Cough    Spinal stenosis of lumbar region with neurogenic claudication    Lumbar spondylosis    T12 compression fracture (HCC)    Synovial cyst of lumbar facet joint    Anxiety    Bulimia nervosa    Constipation    Esophageal reflux    Hyperlipidemia    Hypothyroidism    Schizoaffective disorder, depressive type (HCC)    Other fatigue    Serotonin syndrome    Delirium    Tachycardia    Rhabdomyolysis    Hypophosphatemia    Hypocalcemia    Hypokalemia    Leukopenia    Major depressive disorder, recurrent severe without psychotic features ([facility] [address] )       Past Medical History  Past Medical History:   Diagnosis Date    Anxiety     Back pain at L4-L5 level     Schreiber esophagus     Bulimia     Depression     Disease of thyroid gland     hypothyroid    GERD (gastroesophageal reflux disease)     Hyperlipidemia     Hypothyroidism     Leukopenia     Synovial cyst of lumbar spine     Vertigo     Weight gain        Past Surgical History  Past Surgical History:   Procedure Laterality Date    BONE MARROW BIOPSY      BREAST SURGERY      enlargement     RHINOPLASTY          08/17/18 08   Note Type   Note type Eval only   Pain Assessment   Pain Assessment 0-10   Pain Score No Pain   Home Living   Type of Home House   Additional Comments Resides w/ mother in 1 story home, 1 FAMILIA    Normally indep, ambulates w/ out device   Prior Function   Level of [place] Independent with ADLs and functional mobility   Falls in the last 6 months 0   Restrictions/Precautions   Weight Bearing Precautions Per Order No   Other Precautions Cognitive;1:1;Chair Alarm; Bed Alarm;Multiple lines; Fall Risk   General   Family/Caregiver Present No   Cognition   Overall Cognitive Status WFL   Arousal/Participation Responsive   Orientation Level Oriented X4   Memory Unable to assess   Following Commands Follows one step commands without difficulty   RLE Assessment   RLE Assessment (strength 4-/5- assessed w/ mobility)   LLE Assessment   LLE Assessment (strength 4-/5- assessed w/ mobility)   Coordination   Movements are Fluid and Coordinated 0   Coordination and Movement Description B LE ataxia   Bed Mobility   Supine to Sit 4  Minimal assistance   Additional items Assist x 1   Transfers   Sit to Stand 4  Minimal assistance   Additional items Assist x 1   Stand to Sit 4  Minimal assistance   Additional items Assist x 1   Ambulation/Elevation   Gait pattern (slow, mild ataxia)   Gait Assistance 4  Minimal assist   Additional items Assist x 1   Assistive Device Rolling walker   Distance 80'   Balance   Static Sitting Normal   Dynamic Sitting Good   Static Standing Fair   Dynamic Standing Fair -   Ambulatory Fair -   Endurance Deficit   Endurance Deficit Yes   Endurance Deficit Description fatigue, weakness   Activity Tolerance   Activity Tolerance Patient limited by fatigue;Treatment limited secondary to medical complications (Comment)   Nurse Made Aware yes   Assessment   Prognosis Good   Problem List Decreased strength;Decreased endurance; Impaired balance;Decreased mobility; Decreased coordination   Assessment Pt seen for high complexity physical therapy evaluation  Pt is a 63 y/o female w/ history/comorbidities of depression, HTN, HLD who is now admitted w/ change in MS, confusion, sz activity p apparent OD on Paxil  Intubated on admit, and w/ sz activity  Dx is seratonin syndrome  Undergoing w/u, and on 1:1 presently    Due to need for 1:1, fall risk, likely need for IP psych admit, note unstable clinical picture  PT consulted to assess mobility, d/c needs  Pt presents w/ decreased functional mob, standing balance, endurance, B LE strength, barriers at home  will benefit from skileld PT to correct for the above problems  Plan is for IP psych at d/c, then norris  Agree w  same   Goals   Patient Goals to start walking more   STG Expiration Date 08/31/18   Short Term Goal #1 1-2 wks: bed mbo and transfers w/ indep, standing balance to good/normal w/ device, ambulate 200-300 ft w/ RW vs least restrictive device and mod I, increase B LE strength by 1/2 -1 grade, ambulate 1 FAMILIA w/ S   Treatment Day 0   Plan   Treatment/Interventions Functional transfer training;LE strengthening/ROM; Therapeutic exercise; Endurance training;Patient/family training;Equipment eval/education; Bed mobility;Gait training   PT Frequency 2-3x/wk;5x/wk  (3-5x/wk)   Recommendation   Recommendation (plan is for d/c to IP psych, then home)   Equipment Recommended Priscial Powell   PT - OK to Discharge Yes  (see above)   Modified Sterling Scale   Modified Sterling Scale 4   Barthel Index   Feeding 10   Bathing 0   Grooming Score 5   Dressing Score 5   Bladder Score 10   Bowels Score 10   Toilet Use Score 5   Transfers (Bed/Chair) Score 10   Mobility (Level Surface) Score 0   Stairs Score 0   Barthel Index Score 55       Alyssa Leung PT, DPT, CSRS

## 2018-08-17 NOTE — PROGRESS NOTES
Progress Note - Behavioral Health   Page Zakia 62 y o  female MRN: [id number]  [facility] Encounter: [id number]        I came to see the patient for continuation of care patient is doing a little better from the medical point of view today, she still very depressed and worries about her daily living situation  She still has some suicidal thoughts, she denies any hallucinations  She had better sleep and appetite today  She still feels hopeless and helpless  Behavior over the last 24 hours:  unchanged  Sleep: normal  Appetite: normal  Medication side effects: No  ROS: no complaints    Mental Status Evaluation:  Appearance:  age appropriate   Behavior:  cooperative   Speech:  soft   Mood:  depressed   Affect:  mood-congruent   Language: naming objects and repeating phrases   Thought Process:  goal directed   Associations: intact associations   Thought Content:  normal   Perceptual Disturbances: None   Risk Potential: Status post overdose with multiple medication   Sensorium:  person, place, time/date, situation and day of week   Memory:  recent and remote memory grossly intact   Cognition:  grossly intact   Consciousness:  alert and awake    Attention: attention span and concentration were age appropriate   Intellect: within normal limits   Fund of Knowledge: awareness of current events: Good, past history: Good and vocabulary: Good   Insight:  limited   Judgment: limited   Muscle Strength and Tone: Within normal limits   Gait/Station: Using a walker   Motor Activity: no abnormal movements         Assessment/Plan  Page Zakia is a 62 y o  female with major depression presented to the hospital after she took an overdose of multiple medication in a suicidal attempt, she has been depressed for many years but has been more depressed in the last year secondary to her relationship with mother and she live with    She had multiple inpatient psych admissions in the past but last one was 10 years ago  She still feels very depressed and still has suicidal thoughts  Diagnosis:  Major depressive disorder severe recurrent without psychotic features F33  2    Recommended Treatment:   Continue medical management  Continue one-to-one observation  We will not restart the psychotropic medication until she is medically cleared  Inpatient psych admission medically cleared and bed available patient is a 12  I discussed discussed with primary team and a   I will follow up      Medications:   current meds:   Current Facility-Administered Medications   Medication Dose Route Frequency    acetaminophen (TYLENOL) oral suspension 650 mg  650 mg Oral Q4H PRN    calcium carbonate (TUMS) chewable tablet 500 mg  500 mg Oral Daily PRN    diazepam (VALIUM) injection 5 mg  5 mg Intravenous Q4H PRN    docusate sodium (COLACE) capsule 100 mg  100 mg Oral BID    enoxaparin (LOVENOX) subcutaneous injection 40 mg  40 mg Subcutaneous Daily    ferrous sulfate tablet 325 mg  325 mg Oral BID    levothyroxine tablet 25 mcg  25 mcg Oral Early Morning    pantoprazole (PROTONIX) EC tablet 20 mg  20 mg Oral HS    senna-docusate sodium (SENOKOT S) 8 6-50 mg per tablet 1 tablet  1 tablet Oral HS    sodium chloride 0 9 % 1,000 mL with potassium chloride 30 mEq, potassium phosphate 20 46 mmol infusion  30 mL/hr Intravenous Continuous         Risks, benefits and possible side effects of Medications:     Risks, benefits, and possible side effects of medications explained to patient and patient verbalizes understanding  Labs: I have personally reviewed all pertinent laboratory results       Lab Results   Component Value Date    GLUCOSE 124 08/17/2018    CALCIUM 8 6 08/17/2018     (L) 08/17/2018    K 3 9 08/17/2018    CO2 27 08/17/2018    CL 99 (L) 08/17/2018    BUN 6 08/17/2018    CREATININE 0 51 (L) 08/17/2018     Lab Results   Component Value Date    WBC 2 99 (L) 08/17/2018    HGB 10 1 (L) 08/17/2018 HCT 29 8 (L) 08/17/2018    MCV 87 08/17/2018     08/17/2018         Maida Escobar MD

## 2018-08-17 NOTE — ASSESSMENT & PLAN NOTE
Likely acute on chronic  - On presentation, Na 112 --> pt was given a bolus of hypertonic saline, and then transitioned to 1/2NS with slow correction of Na  - Na 130-->125 on BMP yesterday evening   - NS restarted --> AM BMP pending   - Will continue to monitor --> if Na >130 this AM, will halve fluids and monitor for rebound hyponatremia with evening BMP

## 2018-08-17 NOTE — PROGRESS NOTES
Progress Note - Michael Carroll 1960, 62 y o  female MRN: [id number]    Unit/Bed#: [facility] 727-01 Encounter: [id number]    Primary Care Provider: Jackie Winters MD   Date and time admitted to hospital: 8/13/2018  6:51 AM        Serotonin syndrome   Assessment & Plan    Secondary to overdose of psychotropics --> pt reported taking 3 tabs of Geodon and an unknown number of Paxil/Seroquel     On presentation to ED 8/13 pt was altered, febrile, and tachycardic; she had a witnessed seizure in the ED, for which she was intubated and sedated   She was initially admitted to ICU, and managed with Propofol titration and benzodiazepines  - S/p extubation on 8/15    - Appreciate Toxicology input    - Continue Valium 5 mg q4h PRN for spasm, seizure, agitation --> pt has not required a dose since 0200 on 8/16        Major depressive disorder, recurrent severe without psychotic features ([facility] [address] )   Assessment & Plan    S/p Overdose   - Appreciate Psychiatry input    - 201 signed --> pt will be admitted to inpatient psychiatry once medically clear   - 1:1 observation while on Med Surg    - Continue to hold PTA psychotropics (Geodon, Paxil, Seroquel)        Hypokalemia   Assessment & Plan    K 3 3-->3 7 yesterday  - Pt restarted on NS w/ 30 mEq K overnight   - AM BMP pending   - Continue to monitor with q12h BMP and replete as needed for goal >4        Hypothyroidism   Assessment & Plan    TSH 3 28 (this admission)  - If pt took Levothyroxine as part of her overdose, it would take 3-7 days to develop thyrotoxicosis    - Will continue to monitor throughout pt's admission   - Continue PTA Levothyroxine 25 mcg daily         Hyponatremia   Assessment & Plan    Likely acute on chronic  - On presentation, Na 112 --> pt was given a bolus of hypertonic saline, and then transitioned to 1/2NS with slow correction of Na  - Na 130-->125 on BMP yesterday evening   - NS restarted --> AM BMP pending   - Will continue to monitor --> if Na >130 this AM, will halve fluids and monitor for rebound hyponatremia with evening BMP        Leukopenia   Assessment & Plan    WBC 9 9 on admission --> trending down throughout admission to 2 99 today (ANC WNL)  - Likely secondary to both fluid dilution as well as pt's baseline WBC on the low end (previous WBC noted to be 2-4 after resolution of illness on previous admission in 2016)  - Continue to monitor         Hypocalcemia   Assessment & Plan    Ca 7 9 yesterday (iCa 1 06) --> repleted 2g  - AM BMP pending          Hypophosphatemia   Assessment & Plan    2 0 (8/15)-->2 6 today   - Pt restarted on NS w/ 20 mmol Phos   - Continue to monitor and replete as needed for goal >3        Tachycardia   Assessment & Plan    Secondary to Serotonin Syndrome   - Improved --> HR 80-90s overnight           Delirium   Assessment & Plan    RESOLVED  - Pt alert and oriented to person, place, time, and situation         Rhabdomyolysis   Assessment & Plan    Likely secondary to Serotonin Syndrome and associated seizure    Total CK: 5,732-->14,666-->11120-->6559 (8/14)  - Improved, s/p fluid hydration   - No longer monitoring given >50% decrease from peak level   - Pt continues to receive fluid hydration and kidney function stable --> no concern for worsening at this time            Diet: Regular + 1800 mL fluid restriction  DVT PPx: Lovenox, SCDs  Code: Full  Dispo: Pt has signed a 201 and will be discharged to an inpatient psychiatric unit once medically cleared; in regards to Serotonin Syndrome, pt is medically stable; however, pt's hyponatremia is still labile and thus will require further management; likely at least one more midnight prior to clearance     Plan will be discussed with Dr Vincenzo Yañez and [facility] Team    Subjective:   Pt seen and examined at bedside  No acute complaints  Pt states she notes some "raspy-ness" to her voice, but otherwise denies shortness of breath, chest pain, dizziness, or headache  She is tolerating PO  She states she would like to be able to "walk better" as this is not back to her baseline yet       Objective:   Current Facility-Administered Medications   Medication Dose Route Frequency    acetaminophen (TYLENOL) oral suspension 650 mg  650 mg Oral Q4H PRN    diazepam (VALIUM) injection 5 mg  5 mg Intravenous Q4H PRN    enoxaparin (LOVENOX) subcutaneous injection 40 mg  40 mg Subcutaneous Daily    ferrous sulfate tablet 325 mg  325 mg Oral BID    levothyroxine tablet 25 mcg  25 mcg Oral Early Morning    pantoprazole (PROTONIX) EC tablet 20 mg  20 mg Oral HS    senna-docusate sodium (SENOKOT S) 8 6-50 mg per tablet 1 tablet  1 tablet Oral HS    sodium chloride 0 9 % 1,000 mL with potassium chloride 30 mEq, potassium phosphate 20 46 mmol infusion  75 mL/hr Intravenous Continuous     Allergies   Allergen Reactions    Latex     Risperdal [Risperidone]     Zyprexa [Olanzapine]        Labs:  WBC 2 99  Hb 10 1  Plt 207    BMP pending     Imaging/Other:  No new imaging       Vitals:  Vitals:    08/16/18 1800 08/16/18 2223 08/17/18 0313 08/17/18 0718   BP: 119/78 138/81 128/69 147/82   BP Location:  Right arm Right arm Right arm   Pulse: 82 85 96 82   Resp: 20 20 20 18   Temp:  98 4 °F (36 9 °C) 98 3 °F (36 8 °C) 98 6 °F (37 °C)   TempSrc:  Oral Oral Oral   SpO2: 98% 95% 98% 98%   Weight:       Height:             Intake/Output Summary (Last 24 hours) at 08/17/18 0848  Last data filed at 08/17/18 6354   Gross per 24 hour   Intake             1228 ml   Output             1200 ml   Net               28 ml       Physical Exam:   General: No acute distress  HEENT  Head: NC/AT  Mouth: Mucus membranes moist   Cardio: Normal S1 S2, regular rate and rhythm  Resp: Good respiratory effort, lungs clear to auscultation bilaterally, transmitted upper airway sounds from mouth breathing  Abdomen: Soft, non distended, non tender to palpation, normal active bowel sounds  Extremities: No LE edema  Skin: Warm, dry, no rash  Neuro: AAOx4, no gross deficits noted  Psych: No acute anxiety or agitation     Invasive Devices     Peripheral Intravenous Line            Peripheral IV 08/15/18 Left Forearm 1 day                  Yordan Bolaños DO  Family Medicine  PGY-3  08/17/18 8:48 AM

## 2018-08-17 NOTE — ASSESSMENT & PLAN NOTE
S/p Overdose   - Appreciate Psychiatry input    - 201 signed --> pt will be admitted to inpatient psychiatry once medically clear   - 1:1 observation while on Med Surg    - Continue to hold PTA psychotropics (Geodon, Paxil, Seroquel)

## 2018-08-18 PROBLEM — R00.0 TACHYCARDIA: Status: RESOLVED | Noted: 2018-08-13 | Resolved: 2018-08-18

## 2018-08-18 PROBLEM — M62.82 RHABDOMYOLYSIS: Status: RESOLVED | Noted: 2018-08-13 | Resolved: 2018-08-18

## 2018-08-18 PROBLEM — R41.0 DELIRIUM: Status: RESOLVED | Noted: 2018-08-13 | Resolved: 2018-08-18

## 2018-08-18 LAB
ANION GAP SERPL CALCULATED.3IONS-SCNC: 5 MMOL/L (ref 4–13)
ANION GAP SERPL CALCULATED.3IONS-SCNC: 7 MMOL/L (ref 4–13)
BACTERIA BLD CULT: NORMAL
BASOPHILS # BLD AUTO: 0.02 THOUSANDS/ΜL (ref 0–0.1)
BASOPHILS NFR BLD AUTO: 1 % (ref 0–1)
BUN SERPL-MCNC: 10 MG/DL (ref 5–25)
BUN SERPL-MCNC: 8 MG/DL (ref 5–25)
CALCIUM SERPL-MCNC: 8.7 MG/DL (ref 8.3–10.1)
CALCIUM SERPL-MCNC: 8.7 MG/DL (ref 8.3–10.1)
CHLORIDE SERPL-SCNC: 100 MMOL/L (ref 100–108)
CHLORIDE SERPL-SCNC: 95 MMOL/L (ref 100–108)
CO2 SERPL-SCNC: 28 MMOL/L (ref 21–32)
CO2 SERPL-SCNC: 29 MMOL/L (ref 21–32)
CREAT SERPL-MCNC: 0.45 MG/DL (ref 0.6–1.3)
CREAT SERPL-MCNC: 0.77 MG/DL (ref 0.6–1.3)
EOSINOPHIL # BLD AUTO: 0.07 THOUSAND/ΜL (ref 0–0.61)
EOSINOPHIL NFR BLD AUTO: 3 % (ref 0–6)
ERYTHROCYTE [DISTWIDTH] IN BLOOD BY AUTOMATED COUNT: 13.9 % (ref 11.6–15.1)
GFR SERPL CREATININE-BSD FRML MDRD: 112 ML/MIN/1.73SQ M
GFR SERPL CREATININE-BSD FRML MDRD: 86 ML/MIN/1.73SQ M
GLUCOSE SERPL-MCNC: 93 MG/DL (ref 65–140)
GLUCOSE SERPL-MCNC: 93 MG/DL (ref 65–140)
HCT VFR BLD AUTO: 30.7 % (ref 34.8–46.1)
HGB BLD-MCNC: 10.4 G/DL (ref 11.5–15.4)
IMM GRANULOCYTES # BLD AUTO: 0.04 THOUSAND/UL (ref 0–0.2)
IMM GRANULOCYTES NFR BLD AUTO: 1 % (ref 0–2)
LYMPHOCYTES # BLD AUTO: 0.59 THOUSANDS/ΜL (ref 0.6–4.47)
LYMPHOCYTES NFR BLD AUTO: 21 % (ref 14–44)
MAGNESIUM SERPL-MCNC: 2 MG/DL (ref 1.6–2.6)
MCH RBC QN AUTO: 29.6 PG (ref 26.8–34.3)
MCHC RBC AUTO-ENTMCNC: 33.9 G/DL (ref 31.4–37.4)
MCV RBC AUTO: 88 FL (ref 82–98)
MONOCYTES # BLD AUTO: 0.43 THOUSAND/ΜL (ref 0.17–1.22)
MONOCYTES NFR BLD AUTO: 15 % (ref 4–12)
NEUTROPHILS # BLD AUTO: 1.65 THOUSANDS/ΜL (ref 1.85–7.62)
NEUTS SEG NFR BLD AUTO: 59 % (ref 43–75)
NRBC BLD AUTO-RTO: 0 /100 WBCS
PHOSPHATE SERPL-MCNC: 3.9 MG/DL (ref 2.7–4.5)
PLATELET # BLD AUTO: 239 THOUSANDS/UL (ref 149–390)
PMV BLD AUTO: 8.6 FL (ref 8.9–12.7)
POTASSIUM SERPL-SCNC: 3.7 MMOL/L (ref 3.5–5.3)
POTASSIUM SERPL-SCNC: 4 MMOL/L (ref 3.5–5.3)
RBC # BLD AUTO: 3.51 MILLION/UL (ref 3.81–5.12)
SODIUM SERPL-SCNC: 129 MMOL/L (ref 136–145)
SODIUM SERPL-SCNC: 135 MMOL/L (ref 136–145)
WBC # BLD AUTO: 2.8 THOUSAND/UL (ref 4.31–10.16)

## 2018-08-18 PROCEDURE — 85025 COMPLETE CBC W/AUTO DIFF WBC: CPT | Performed by: FAMILY MEDICINE

## 2018-08-18 PROCEDURE — 84100 ASSAY OF PHOSPHORUS: CPT | Performed by: FAMILY MEDICINE

## 2018-08-18 PROCEDURE — 80048 BASIC METABOLIC PNL TOTAL CA: CPT | Performed by: FAMILY MEDICINE

## 2018-08-18 PROCEDURE — 83735 ASSAY OF MAGNESIUM: CPT | Performed by: FAMILY MEDICINE

## 2018-08-18 PROCEDURE — 99232 SBSQ HOSP IP/OBS MODERATE 35: CPT | Performed by: FAMILY MEDICINE

## 2018-08-18 RX ADMIN — SENNOSIDES AND DOCUSATE SODIUM 1 TABLET: 8.6; 5 TABLET ORAL at 22:04

## 2018-08-18 RX ADMIN — Medication 325 MG: at 17:06

## 2018-08-18 RX ADMIN — DOCUSATE SODIUM 100 MG: 100 CAPSULE, LIQUID FILLED ORAL at 17:06

## 2018-08-18 RX ADMIN — DOCUSATE SODIUM 100 MG: 100 CAPSULE, LIQUID FILLED ORAL at 09:44

## 2018-08-18 RX ADMIN — LEVOTHYROXINE SODIUM 25 MCG: 25 TABLET ORAL at 05:54

## 2018-08-18 RX ADMIN — PANTOPRAZOLE SODIUM 20 MG: 20 TABLET, DELAYED RELEASE ORAL at 22:04

## 2018-08-18 RX ADMIN — ENOXAPARIN SODIUM 40 MG: 40 INJECTION SUBCUTANEOUS at 09:44

## 2018-08-18 RX ADMIN — Medication 325 MG: at 09:44

## 2018-08-18 NOTE — ASSESSMENT & PLAN NOTE
Likely secondary to Serotonin Syndrome and associated seizure    Total CK: 5,732-->14,666-->60706-->6559 (8/14)  - Improved, s/p fluid hydration   - No longer monitoring given >50% decrease from peak level   - Pt continues to receive fluid hydration and kidney function stable --> no concern for worsening at this time

## 2018-08-18 NOTE — ASSESSMENT & PLAN NOTE
WBC 9 9 on admission --> trending down throughout admission to 2 8 today  - Also neutropenic, although this is gradually improving   - Continue to monitor

## 2018-08-18 NOTE — ASSESSMENT & PLAN NOTE
S/p Overdose   - Appreciate Psychiatry input    - 201 signed --> pt will be admitted to inpatient psychiatry once medically cleared   - 1:1 observation while on Med Surg    - Continue to hold PTA psychotropics (Geodon, Paxil, Seroquel)

## 2018-08-18 NOTE — ASSESSMENT & PLAN NOTE
Likely acute on chronic  - On presentation, Na 112 --> pt was given a bolus of hypertonic saline, and then transitioned to 1/2NS with slow correction of Na  - Na 130-->131 on BMP yesterday evening   - Continue NS --> AM BMP pending

## 2018-08-18 NOTE — PROGRESS NOTES
Progress Note - Carissa Bauer 1960, 62 y o  female MRN: [id number]  Unit/Bed#: [facility] 727-01 Encounter: [id number]  Primary Care Provider: Jair Marino MD   Date and time admitted to hospital: 8/13/2018  6:51 AM    Major depressive disorder, recurrent severe without psychotic features ([facility] [address] )   Assessment & Plan    S/p Overdose   - Appreciate Psychiatry input    - 201 signed --> pt will be admitted to inpatient psychiatry once medically cleared   - 1:1 observation while on Med Surg    - Continue to hold PTA psychotropics (Geodon, Paxil, Seroquel)        Leukopenia   Assessment & Plan    WBC 9 9 on admission --> trending down throughout admission to 2 8 today  - Also neutropenic, although this is gradually improving   - Continue to monitor         Hypokalemia   Assessment & Plan    K 3 9 yesterday  - Continuen NS w/ 30 mEq K   - AM BMP pending   - Continue to monitor with q12h BMP and replete as needed for goal >4        Hypocalcemia   Assessment & Plan    Ca 9 3 yesterday   - AM BMP pending          Hypophosphatemia   Assessment & Plan    2 0 (8/15)-->2 6 today   - Pt restarted on NS w/ 20 mmol Phos   - Continue to monitor and replete as needed for goal >3        Serotonin syndrome   Assessment & Plan    Secondary to overdose of psychotropics --> pt reported taking 3 tabs of Geodon and an unknown number of Paxil/Seroquel     On presentation to ED 8/13 pt was altered, febrile, and tachycardic; she had a witnessed seizure in the ED, for which she was intubated and sedated   She was initially admitted to ICU, and managed with Propofol titration and benzodiazepines  - S/p extubation on 8/15    - Appreciate Toxicology input    - Continue Valium 5 mg q4h PRN for spasm, seizure, agitation --> pt has not required a dose since 0200 on 8/16        Hypothyroidism   Assessment & Plan    TSH 3 28 (this admission)  - If pt took Levothyroxine as part of her overdose, it would take 3-7 days to develop thyrotoxicosis    - Will continue to monitor throughout pt's admission   - Continue PTA Levothyroxine 25 mcg daily         Hyponatremia   Assessment & Plan    Likely acute on chronic  - On presentation, Na 112 --> pt was given a bolus of hypertonic saline, and then transitioned to 1/2NS with slow correction of Na  - Na 130-->131 on BMP yesterday evening   - Continue NS --> AM BMP pending        Rhabdomyolysisresolved as of 8/18/2018   Assessment & Plan    Likely secondary to Serotonin Syndrome and associated seizure    Total CK: 5,732-->14,666-->96164-->6559 (8/14)  - Improved, s/p fluid hydration   - No longer monitoring given >50% decrease from peak level   - Pt continues to receive fluid hydration and kidney function stable --> no concern for worsening at this time        Tachycardiaresolved as of 8/18/2018   Assessment & Plan    Secondary to Serotonin Syndrome   - Improved --> HR 80-90s overnight           Deliriumresolved as of 8/18/2018   Assessment & Plan    RESOLVED  - Pt alert and oriented to person, place, time, and situation               Diet:        Diet Orders            Start     Ordered    08/16/18 2303  Diet Regular; Regular House; Fluid Restriction 1800 ML  Diet effective now     Question Answer Comment   Diet Type Regular    Regular Regular House    Other Restriction(s): Fluid Restriction 1800 ML    RD to adjust diet per protocol? Yes        08/16/18 2303        DVT Ppx: Lov, SCD  Code: Full  [facility]: [facility]  Anticipated discharge 1-2 days to psychiatry unit  Subjective:   Patient has no complaints today  Seen and examined at bedside  Discussed with overnight resident, nursing staff and [facility] hospital service team      Objective:     Vitals: Blood pressure 141/80, pulse 76, temperature 98 7 °F (37 1 °C), temperature source Oral, resp  rate 16, height 5' 6" (1 676 m), weight 62 8 kg (138 lb 7 2 oz), SpO2 95 %  ,Body mass index is 22 35 kg/m²        Intake/Output Summary (Last 24 hours) at 08/18/18 0850  Last data filed at 08/18/18 6000   Gross per 24 hour   Intake              340 ml   Output             1656 ml   Net            -1316 ml       Physical Exam:     Physical Exam    Invasive Devices     Peripheral Intravenous Line            Peripheral IV 08/15/18 Left Forearm 2 days    Peripheral IV 08/17/18 Right Hand less than 1 day                          Lab, Imaging and other studies: I have personally reviewed pertinent reports       Pertinent Lab Findings:   ·     ·   Recent Results (from the past 24 hour(s))   Basic metabolic panel    Collection Time: 08/17/18  6:36 PM   Result Value Ref Range    Sodium 130 (L) 136 - 145 mmol/L    Potassium 3 9 3 5 - 5 3 mmol/L    Chloride 94 (L) 100 - 108 mmol/L    CO2 27 21 - 32 mmol/L    Anion Gap 9 4 - 13 mmol/L    BUN 9 5 - 25 mg/dL    Creatinine 0 58 (L) 0 60 - 1 30 mg/dL    Glucose 100 65 - 140 mg/dL    Calcium 8 9 8 3 - 10 1 mg/dL    eGFR 103 ml/min/1 73sq m   Basic metabolic panel    Collection Time: 08/17/18  9:29 PM   Result Value Ref Range    Sodium 131 (L) 136 - 145 mmol/L    Potassium 3 9 3 5 - 5 3 mmol/L    Chloride 94 (L) 100 - 108 mmol/L    CO2 27 21 - 32 mmol/L    Anion Gap 10 4 - 13 mmol/L    BUN 7 5 - 25 mg/dL    Creatinine 0 53 (L) 0 60 - 1 30 mg/dL    Glucose 87 65 - 140 mg/dL    Calcium 9 3 8 3 - 10 1 mg/dL    eGFR 106 ml/min/1 73sq m   Magnesium    Collection Time: 08/18/18  5:40 AM   Result Value Ref Range    Magnesium 2 0 1 6 - 2 6 mg/dL   Phosphorus    Collection Time: 08/18/18  5:40 AM   Result Value Ref Range    Phosphorus 3 9 2 7 - 4 5 mg/dL   CBC and differential    Collection Time: 08/18/18  5:40 AM   Result Value Ref Range    WBC 2 80 (L) 4 31 - 10 16 Thousand/uL    RBC 3 51 (L) 3 81 - 5 12 Million/uL    Hemoglobin 10 4 (L) 11 5 - 15 4 g/dL    Hematocrit 30 7 (L) 34 8 - 46 1 %    MCV 88 82 - 98 fL    MCH 29 6 26 8 - 34 3 pg    MCHC 33 9 31 4 - 37 4 g/dL    RDW 13 9 11 6 - 15 1 %    MPV 8 6 (L) 8 9 - 12 7 fL    Platelets 297 931 - 891 Thousands/uL    nRBC 0 /100 WBCs    Neutrophils Relative 59 43 - 75 %    Immat GRANS % 1 0 - 2 %    Lymphocytes Relative 21 14 - 44 %    Monocytes Relative 15 (H) 4 - 12 %    Eosinophils Relative 3 0 - 6 %    Basophils Relative 1 0 - 1 %    Neutrophils Absolute 1 65 (L) 1 85 - 7 62 Thousands/µL    Immature Grans Absolute 0 04 0 00 - 0 20 Thousand/uL    Lymphocytes Absolute 0 59 (L) 0 60 - 4 47 Thousands/µL    Monocytes Absolute 0 43 0 17 - 1 22 Thousand/µL    Eosinophils Absolute 0 07 0 00 - 0 61 Thousand/µL    Basophils Absolute 0 02 0 00 - 0 10 Thousands/µL        Imaging:  None    VTE Pharmacologic Prophylaxis: Enoxaparin (Lovenox)  VTE Mechanical Prophylaxis: sequential compression device    Current Facility-Administered Medications   Medication Dose Route Frequency    acetaminophen (TYLENOL) oral suspension 650 mg  650 mg Oral Q4H PRN    calcium carbonate (TUMS) chewable tablet 500 mg  500 mg Oral Daily PRN    diazepam (VALIUM) injection 5 mg  5 mg Intravenous Q4H PRN    docusate sodium (COLACE) capsule 100 mg  100 mg Oral BID    enoxaparin (LOVENOX) subcutaneous injection 40 mg  40 mg Subcutaneous Daily    ferrous sulfate tablet 325 mg  325 mg Oral BID    levothyroxine tablet 25 mcg  25 mcg Oral Early Morning    pantoprazole (PROTONIX) EC tablet 20 mg  20 mg Oral HS    senna-docusate sodium (SENOKOT S) 8 6-50 mg per tablet 1 tablet  1 tablet Oral HS    sodium chloride 0 9 % 1,000 mL with potassium chloride 30 mEq, potassium phosphate 20 46 mmol infusion  30 mL/hr Intravenous Continuous       Deysi Nickerson MD  [facility] Medicine PGY-3  8/18/2018  8:50 AM    Please be aware that this note contains text that was dictated and there may be errors pertaining to "sound-alike" words during the dictation process

## 2018-08-18 NOTE — ASSESSMENT & PLAN NOTE
K 3 9 yesterday  - Continuen NS w/ 30 mEq K   - AM BMP pending   - Continue to monitor with q12h BMP and replete as needed for goal >4

## 2018-08-19 LAB
ANION GAP SERPL CALCULATED.3IONS-SCNC: 2 MMOL/L (ref 4–13)
ANION GAP SERPL CALCULATED.3IONS-SCNC: 4 MMOL/L (ref 4–13)
ANION GAP SERPL CALCULATED.3IONS-SCNC: 5 MMOL/L (ref 4–13)
BASOPHILS # BLD AUTO: 0.02 THOUSANDS/ΜL (ref 0–0.1)
BASOPHILS NFR BLD AUTO: 1 % (ref 0–1)
BUN SERPL-MCNC: 10 MG/DL (ref 5–25)
BUN SERPL-MCNC: 9 MG/DL (ref 5–25)
BUN SERPL-MCNC: 9 MG/DL (ref 5–25)
CALCIUM SERPL-MCNC: 8.6 MG/DL (ref 8.3–10.1)
CALCIUM SERPL-MCNC: 8.9 MG/DL (ref 8.3–10.1)
CALCIUM SERPL-MCNC: 9 MG/DL (ref 8.3–10.1)
CHLORIDE SERPL-SCNC: 95 MMOL/L (ref 100–108)
CHLORIDE SERPL-SCNC: 95 MMOL/L (ref 100–108)
CHLORIDE SERPL-SCNC: 97 MMOL/L (ref 100–108)
CO2 SERPL-SCNC: 29 MMOL/L (ref 21–32)
CO2 SERPL-SCNC: 30 MMOL/L (ref 21–32)
CO2 SERPL-SCNC: 32 MMOL/L (ref 21–32)
CREAT SERPL-MCNC: 0.5 MG/DL (ref 0.6–1.3)
CREAT SERPL-MCNC: 0.54 MG/DL (ref 0.6–1.3)
CREAT SERPL-MCNC: 0.61 MG/DL (ref 0.6–1.3)
EOSINOPHIL # BLD AUTO: 0.06 THOUSAND/ΜL (ref 0–0.61)
EOSINOPHIL NFR BLD AUTO: 2 % (ref 0–6)
ERYTHROCYTE [DISTWIDTH] IN BLOOD BY AUTOMATED COUNT: 13.9 % (ref 11.6–15.1)
GFR SERPL CREATININE-BSD FRML MDRD: 101 ML/MIN/1.73SQ M
GFR SERPL CREATININE-BSD FRML MDRD: 105 ML/MIN/1.73SQ M
GFR SERPL CREATININE-BSD FRML MDRD: 108 ML/MIN/1.73SQ M
GLUCOSE SERPL-MCNC: 89 MG/DL (ref 65–140)
GLUCOSE SERPL-MCNC: 94 MG/DL (ref 65–140)
GLUCOSE SERPL-MCNC: 98 MG/DL (ref 65–140)
HCT VFR BLD AUTO: 31.4 % (ref 34.8–46.1)
HGB BLD-MCNC: 10.6 G/DL (ref 11.5–15.4)
IMM GRANULOCYTES # BLD AUTO: 0.06 THOUSAND/UL (ref 0–0.2)
IMM GRANULOCYTES NFR BLD AUTO: 2 % (ref 0–2)
LYMPHOCYTES # BLD AUTO: 0.69 THOUSANDS/ΜL (ref 0.6–4.47)
LYMPHOCYTES NFR BLD AUTO: 28 % (ref 14–44)
MAGNESIUM SERPL-MCNC: 2 MG/DL (ref 1.6–2.6)
MCH RBC QN AUTO: 29.9 PG (ref 26.8–34.3)
MCHC RBC AUTO-ENTMCNC: 33.8 G/DL (ref 31.4–37.4)
MCV RBC AUTO: 89 FL (ref 82–98)
MONOCYTES # BLD AUTO: 0.4 THOUSAND/ΜL (ref 0.17–1.22)
MONOCYTES NFR BLD AUTO: 16 % (ref 4–12)
NEUTROPHILS # BLD AUTO: 1.28 THOUSANDS/ΜL (ref 1.85–7.62)
NEUTS SEG NFR BLD AUTO: 51 % (ref 43–75)
NRBC BLD AUTO-RTO: 0 /100 WBCS
OSMOLALITY UR/SERPL-RTO: 277 MMOL/KG (ref 282–298)
OSMOLALITY UR: 180 MMOL/KG
PHOSPHATE SERPL-MCNC: 4.2 MG/DL (ref 2.7–4.5)
PLATELET # BLD AUTO: 287 THOUSANDS/UL (ref 149–390)
PMV BLD AUTO: 8.4 FL (ref 8.9–12.7)
POTASSIUM SERPL-SCNC: 3.5 MMOL/L (ref 3.5–5.3)
POTASSIUM SERPL-SCNC: 3.8 MMOL/L (ref 3.5–5.3)
POTASSIUM SERPL-SCNC: 3.9 MMOL/L (ref 3.5–5.3)
RBC # BLD AUTO: 3.54 MILLION/UL (ref 3.81–5.12)
SODIUM 24H UR-SCNC: 41 MOL/L
SODIUM SERPL-SCNC: 129 MMOL/L (ref 136–145)
SODIUM SERPL-SCNC: 129 MMOL/L (ref 136–145)
SODIUM SERPL-SCNC: 131 MMOL/L (ref 136–145)
WBC # BLD AUTO: 2.51 THOUSAND/UL (ref 4.31–10.16)

## 2018-08-19 PROCEDURE — 80048 BASIC METABOLIC PNL TOTAL CA: CPT | Performed by: FAMILY MEDICINE

## 2018-08-19 PROCEDURE — 83930 ASSAY OF BLOOD OSMOLALITY: CPT | Performed by: FAMILY MEDICINE

## 2018-08-19 PROCEDURE — 83935 ASSAY OF URINE OSMOLALITY: CPT | Performed by: FAMILY MEDICINE

## 2018-08-19 PROCEDURE — 83735 ASSAY OF MAGNESIUM: CPT | Performed by: FAMILY MEDICINE

## 2018-08-19 PROCEDURE — 84300 ASSAY OF URINE SODIUM: CPT | Performed by: FAMILY MEDICINE

## 2018-08-19 PROCEDURE — 84100 ASSAY OF PHOSPHORUS: CPT | Performed by: FAMILY MEDICINE

## 2018-08-19 PROCEDURE — 85025 COMPLETE CBC W/AUTO DIFF WBC: CPT | Performed by: FAMILY MEDICINE

## 2018-08-19 PROCEDURE — 99232 SBSQ HOSP IP/OBS MODERATE 35: CPT | Performed by: FAMILY MEDICINE

## 2018-08-19 RX ORDER — LACTULOSE 20 G/30ML
20 SOLUTION ORAL 2 TIMES DAILY PRN
Status: DISCONTINUED | OUTPATIENT
Start: 2018-08-19 | End: 2018-08-20 | Stop reason: HOSPADM

## 2018-08-19 RX ORDER — POLYETHYLENE GLYCOL 3350 17 G/17G
17 POWDER, FOR SOLUTION ORAL ONCE
Status: COMPLETED | OUTPATIENT
Start: 2018-08-19 | End: 2018-08-19

## 2018-08-19 RX ORDER — POTASSIUM CHLORIDE 20 MEQ/1
20 TABLET, EXTENDED RELEASE ORAL ONCE
Status: COMPLETED | OUTPATIENT
Start: 2018-08-19 | End: 2018-08-19

## 2018-08-19 RX ADMIN — SENNOSIDES AND DOCUSATE SODIUM 1 TABLET: 8.6; 5 TABLET ORAL at 21:44

## 2018-08-19 RX ADMIN — LACTULOSE 20 G: 20 SOLUTION ORAL at 17:18

## 2018-08-19 RX ADMIN — ENOXAPARIN SODIUM 40 MG: 40 INJECTION SUBCUTANEOUS at 09:28

## 2018-08-19 RX ADMIN — DOCUSATE SODIUM 100 MG: 100 CAPSULE, LIQUID FILLED ORAL at 17:16

## 2018-08-19 RX ADMIN — Medication 325 MG: at 17:16

## 2018-08-19 RX ADMIN — Medication 325 MG: at 09:28

## 2018-08-19 RX ADMIN — DOCUSATE SODIUM 100 MG: 100 CAPSULE, LIQUID FILLED ORAL at 09:28

## 2018-08-19 RX ADMIN — PANTOPRAZOLE SODIUM 20 MG: 20 TABLET, DELAYED RELEASE ORAL at 21:44

## 2018-08-19 RX ADMIN — POLYETHYLENE GLYCOL 3350 17 G: 17 POWDER, FOR SOLUTION ORAL at 09:36

## 2018-08-19 RX ADMIN — LEVOTHYROXINE SODIUM 25 MCG: 25 TABLET ORAL at 05:40

## 2018-08-19 RX ADMIN — POTASSIUM CHLORIDE 20 MEQ: 1500 TABLET, EXTENDED RELEASE ORAL at 11:18

## 2018-08-19 NOTE — ASSESSMENT & PLAN NOTE
Patient complains of constipation since Thursday  On Senna and colace regimen     -Miralax ordered once today    -Continue to monitor BMP q12 hours for electrolyte disturbances

## 2018-08-19 NOTE — PROGRESS NOTES
Progress Note - Mary Hill 1960, 62 y o  female MRN: [id number]    Unit/Bed#: [facility] 727-01 Encounter: [id number]    Primary Care Provider: Teresa Keller MD   Date and time admitted to hospital: 8/13/2018  6:51 AM        Major depressive disorder, recurrent severe without psychotic features ([facility] [address] )   Assessment & Plan    S/p Overdose with paroxetine and her ziprasidone  - Psychiatry consult:   - 201 signed --> pt will be admitted to inpatient psychiatry once medically cleared   - Continue 1:1 observation while on Med Surg    - Continue to hold PTA psychotropics (Geodon, Paxil, Seroquel)        Leukopenia   Assessment & Plan    WBC 9 9 on admission --> trending down throughout admission to 2 51 today  - Also neutropenic, although this is gradually improving   - Continue to monitor         Hypokalemia   Assessment & Plan    K 3 5 today  - Ordered Potassium 40 mEq IV  Monitor BMP to reach goal >4  - Continue to monitor q12h BMP        Hypocalcemia   Assessment & Plan    Ca 8 7 this morning  - Continue monitor with BMP          Serotonin syndrome   Assessment & Plan    Secondary to overdose of psychotropics --> pt reported taking 3 tabs of Geodon and an unknown number of Paxil/Seroquel     On admission 8/13 pt was altered, febrile, and tachycardic  She had a witnessed seizure in the ED, for which she was intubated and sedated   She was initially admitted to ICU, and managed with Propofol titration and benzodiazepines  - Patient extubated on 8/15    As per toxicology consult:   - Continue Valium 5 mg q4h PRN for spasm, seizure, agitation --> pt has not required a dose since 0200 on 8/16        Hypothyroidism   Assessment & Plan    TSH 3 28 (this admission)  - If pt took Levothyroxine as part of her overdose, it would take 3-5 (until 8/20) days to develop thyrotoxicosis    - Will continue to monitor throughout pt's admission   - Continue PTA Levothyroxine 25 mcg daily         Constipation   Assessment & Plan    Patient complains of constipation since Thursday  On Senna and colace regimen  -Miralax ordered once today    -Continue to monitor BMP q12 hours for electrolyte disturbances           Hyponatremia   Assessment & Plan    - On admission , Na 112  - Na 129 yesterday-->131 on BMP this morning   - Continue NS and fluid restriction to 1800 ML   - Monitor I/O            PPX: Lovenox, SCD  Diet: Regular  Fluid restriction 1800 ML  Code Status: Full  Dispo: Anticipated d/c 1-2 days to psychiatric unit    Plan D/W Dr Sundra Harada and [facility] Team    Subjective:     Patient is comfortable at chair, she's very pleasant and complains only of constipation  Review of Systems   Constitutional: Negative for activity change, diaphoresis and fatigue  Respiratory: Negative for cough, chest tightness and shortness of breath  Cardiovascular: Negative for chest pain and leg swelling  Gastrointestinal: Positive for abdominal distention and constipation  Negative for abdominal pain and nausea  Musculoskeletal: Negative for back pain and gait problem  Neurological: Negative for weakness, numbness and headaches  Psychiatric/Behavioral: Negative for confusion  The patient is not nervous/anxious  Objective:     Vitals: Blood pressure 127/76, pulse 88, temperature 98 8 °F (37 1 °C), temperature source Oral, resp  rate 18, height 5' 6" (1 676 m), weight 62 8 kg (138 lb 7 2 oz), SpO2 97 %  ,Body mass index is 22 35 kg/m²  Intake/Output Summary (Last 24 hours) at 08/19/18 0942  Last data filed at 08/19/18 0815   Gross per 24 hour   Intake              453 ml   Output             1475 ml   Net            -1022 ml       Physical Exam:   Physical Exam   Constitutional: She appears well-developed and well-nourished  No distress  HENT:   Head: Normocephalic and atraumatic  Cardiovascular: Normal rate, regular rhythm and intact distal pulses  Exam reveals no gallop and no friction rub      No murmur heard   Pulmonary/Chest: Effort normal and breath sounds normal  No respiratory distress  She has no wheezes  She exhibits no tenderness  Abdominal: Soft  Bowel sounds are normal  She exhibits no distension  There is no tenderness  There is no guarding  Musculoskeletal: Normal range of motion  She exhibits no edema  Neurological: She is alert  Skin: Skin is warm  She is not diaphoretic  No erythema  Psychiatric: Her behavior is normal        Invasive Devices     Peripheral Intravenous Line            Peripheral IV 08/17/18 Right Hand 1 day                           Lab and other studies:  I have personally reviewed pertinent reports  Admission on 08/13/2018   No results displayed because visit has over 200 results            Recent Results (from the past 24 hour(s))   Basic metabolic panel    Collection Time: 08/18/18  8:51 PM   Result Value Ref Range    Sodium 129 (L) 136 - 145 mmol/L    Potassium 3 7 3 5 - 5 3 mmol/L    Chloride 95 (L) 100 - 108 mmol/L    CO2 29 21 - 32 mmol/L    Anion Gap 5 4 - 13 mmol/L    BUN 10 5 - 25 mg/dL    Creatinine 0 77 0 60 - 1 30 mg/dL    Glucose 93 65 - 140 mg/dL    Calcium 8 7 8 3 - 10 1 mg/dL    eGFR 86 ml/min/1 73sq m   CBC and differential    Collection Time: 08/19/18  5:09 AM   Result Value Ref Range    WBC 2 51 (L) 4 31 - 10 16 Thousand/uL    RBC 3 54 (L) 3 81 - 5 12 Million/uL    Hemoglobin 10 6 (L) 11 5 - 15 4 g/dL    Hematocrit 31 4 (L) 34 8 - 46 1 %    MCV 89 82 - 98 fL    MCH 29 9 26 8 - 34 3 pg    MCHC 33 8 31 4 - 37 4 g/dL    RDW 13 9 11 6 - 15 1 %    MPV 8 4 (L) 8 9 - 12 7 fL    Platelets 829 308 - 303 Thousands/uL    nRBC 0 /100 WBCs    Neutrophils Relative 51 43 - 75 %    Immat GRANS % 2 0 - 2 %    Lymphocytes Relative 28 14 - 44 %    Monocytes Relative 16 (H) 4 - 12 %    Eosinophils Relative 2 0 - 6 %    Basophils Relative 1 0 - 1 %    Neutrophils Absolute 1 28 (L) 1 85 - 7 62 Thousands/µL    Immature Grans Absolute 0 06 0 00 - 0 20 Thousand/uL Lymphocytes Absolute 0 69 0 60 - 4 47 Thousands/µL    Monocytes Absolute 0 40 0 17 - 1 22 Thousand/µL    Eosinophils Absolute 0 06 0 00 - 0 61 Thousand/µL    Basophils Absolute 0 02 0 00 - 0 10 Thousands/µL   Magnesium    Collection Time: 08/19/18  5:09 AM   Result Value Ref Range    Magnesium 2 0 1 6 - 2 6 mg/dL   Phosphorus    Collection Time: 08/19/18  5:09 AM   Result Value Ref Range    Phosphorus 4 2 2 7 - 4 5 mg/dL   Osmolality    Collection Time: 08/19/18  5:09 AM   Result Value Ref Range    Osmolality Serum 277 (L) 282 - 298 mmol/KG   Basic metabolic panel    Collection Time: 08/19/18  6:08 AM   Result Value Ref Range    Sodium 131 (L) 136 - 145 mmol/L    Potassium 3 5 3 5 - 5 3 mmol/L    Chloride 97 (L) 100 - 108 mmol/L    CO2 30 21 - 32 mmol/L    Anion Gap 4 4 - 13 mmol/L    BUN 9 5 - 25 mg/dL    Creatinine 0 50 (L) 0 60 - 1 30 mg/dL    Glucose 98 65 - 140 mg/dL    Calcium 8 6 8 3 - 10 1 mg/dL    eGFR 108 ml/min/1 73sq m   Osmolality, urine    Collection Time: 08/19/18  7:09 AM   Result Value Ref Range    Osmolality, Ur 180 (L) 250 - 900 mmol/KG   Sodium, urine, random    Collection Time: 08/19/18  7:09 AM   Result Value Ref Range    Sodium, Ur 41      Blood Culture:   Lab Results   Component Value Date    BLOODCX No Growth After 5 Days   08/13/2018    BLOODCX Staphylococcus coagulase negative (A) 08/13/2018   ,   Urinalysis:   Lab Results   Component Value Date    COLORU Yellow 08/13/2018    CLARITYU Clear 08/13/2018    SPECGRAV 1 025 08/13/2018    PHUR 5 5 08/13/2018    LEUKOCYTESUR Negative 08/13/2018    NITRITE Negative 08/13/2018    PROTEINUA 30 (1+) (A) 08/13/2018    GLUCOSEU Negative 08/13/2018    KETONESU 15 (1+) (A) 08/13/2018    BILIRUBINUR Negative 08/13/2018    BLOODU Moderate (A) 08/13/2018   ,   Urine Culture:   Lab Results   Component Value Date    URINECX 70,000-79,000 cfu/ml Mixed Contaminants X5 09/24/2016   ,   Wound Culure: No results found for: WOUNDCULT       VTE Pharmacologic Prophylaxis: Sequential compression device (Venodyne)  and Enoxaparin (Lovenox)  VTE Mechanical Prophylaxis: sequential compression device    Current Facility-Administered Medications   Medication Dose Route Frequency    acetaminophen (TYLENOL) oral suspension 650 mg  650 mg Oral Q4H PRN    calcium carbonate (TUMS) chewable tablet 500 mg  500 mg Oral Daily PRN    diazepam (VALIUM) injection 5 mg  5 mg Intravenous Q4H PRN    docusate sodium (COLACE) capsule 100 mg  100 mg Oral BID    enoxaparin (LOVENOX) subcutaneous injection 40 mg  40 mg Subcutaneous Daily    ferrous sulfate tablet 325 mg  325 mg Oral BID    levothyroxine tablet 25 mcg  25 mcg Oral Early Morning    pantoprazole (PROTONIX) EC tablet 20 mg  20 mg Oral HS    potassium chloride 20 mEq in dextrose 5 % 100 mL IVPB  20 mEq Intravenous Q2H    senna-docusate sodium (SENOKOT S) 8 6-50 mg per tablet 1 tablet  1 tablet Oral HS       Tashi Foster MD  Family Medicine Resident PGY1

## 2018-08-19 NOTE — ASSESSMENT & PLAN NOTE
WBC 9 9 on admission --> trending down throughout admission to 2 51 today  - Also neutropenic, although this is gradually improving   - Continue to monitor

## 2018-08-19 NOTE — ASSESSMENT & PLAN NOTE
- On admission , Na 112  - Na 129 yesterday-->131 on BMP this morning   - Continue NS and fluid restriction to 1800 ML   - Monitor I/O

## 2018-08-19 NOTE — ASSESSMENT & PLAN NOTE
K 3 5 today  - Ordered Potassium 40 mEq IV   Monitor BMP to reach goal >4  - Continue to monitor q12h BMP

## 2018-08-19 NOTE — ASSESSMENT & PLAN NOTE
S/p Overdose with paroxetine and her ziprasidone  - Psychiatry consult:   - 201 signed --> pt will be admitted to inpatient psychiatry once medically cleared   - Continue 1:1 observation while on Med Surg    - Continue to hold PTA psychotropics (Geodon, Paxil, Seroquel)

## 2018-08-19 NOTE — ASSESSMENT & PLAN NOTE
TSH 3 28 (this admission)  - If pt took Levothyroxine as part of her overdose, it would take 3-5 (until 8/20) days to develop thyrotoxicosis    - Will continue to monitor throughout pt's admission   - Continue PTA Levothyroxine 25 mcg daily

## 2018-08-19 NOTE — PLAN OF CARE
Depression - IP adult     Effects of depression will be minimized Progressing        DISCHARGE PLANNING - CARE MANAGEMENT     Discharge to post-acute care or home with appropriate resources Progressing        METABOLIC, FLUID AND ELECTROLYTES - ADULT     Electrolytes maintained within normal limits Progressing     Fluid balance maintained Progressing        Nutrition/Hydration-ADULT     Nutrient/Hydration intake appropriate for improving, restoring or maintaining nutritional needs Progressing        Potential for Falls     Patient will remain free of falls Progressing        Prexisting or High Potential for Compromised Skin Integrity     Skin integrity is maintained or improved Progressing

## 2018-08-19 NOTE — ASSESSMENT & PLAN NOTE
Secondary to overdose of psychotropics --> pt reported taking 3 tabs of Geodon and an unknown number of Paxil/Seroquel     On admission 8/13 pt was altered, febrile, and tachycardic  She had a witnessed seizure in the ED, for which she was intubated and sedated   She was initially admitted to ICU, and managed with Propofol titration and benzodiazepines  - Patient extubated on 8/15    As per toxicology consult:   - Continue Valium 5 mg q4h PRN for spasm, seizure, agitation --> pt has not required a dose since 0200 on 8/16

## 2018-08-20 ENCOUNTER — HOSPITAL ENCOUNTER (INPATIENT)
Facility: HOSPITAL | Age: 58
LOS: 9 days | Discharge: HOME | DRG: 885 | End: 2018-08-29
Attending: PSYCHIATRY & NEUROLOGY | Admitting: PSYCHIATRY & NEUROLOGY
Payer: COMMERCIAL

## 2018-08-20 VITALS
RESPIRATION RATE: 18 BRPM | BODY MASS INDEX: 22.25 KG/M2 | OXYGEN SATURATION: 99 % | HEART RATE: 96 BPM | DIASTOLIC BLOOD PRESSURE: 84 MMHG | SYSTOLIC BLOOD PRESSURE: 144 MMHG | WEIGHT: 138.45 LBS | TEMPERATURE: 97.7 F | HEIGHT: 66 IN

## 2018-08-20 DIAGNOSIS — R41.89 COGNITIVE AND BEHAVIORAL CHANGES: ICD-10-CM

## 2018-08-20 DIAGNOSIS — K59.09 OTHER CONSTIPATION: ICD-10-CM

## 2018-08-20 DIAGNOSIS — R46.89 COGNITIVE AND BEHAVIORAL CHANGES: ICD-10-CM

## 2018-08-20 DIAGNOSIS — F32.9 MAJOR DEPRESSIVE DISORDER: ICD-10-CM

## 2018-08-20 DIAGNOSIS — F25.1 SCHIZOAFFECTIVE DISORDER, DEPRESSIVE TYPE (HCC): Primary | Chronic | ICD-10-CM

## 2018-08-20 PROBLEM — E87.6 HYPOKALEMIA: Status: RESOLVED | Noted: 2018-08-16 | Resolved: 2018-08-20

## 2018-08-20 PROBLEM — E83.51 HYPOCALCEMIA: Status: RESOLVED | Noted: 2018-08-16 | Resolved: 2018-08-20

## 2018-08-20 PROBLEM — E83.39 HYPOPHOSPHATEMIA: Status: RESOLVED | Noted: 2018-08-14 | Resolved: 2018-08-20

## 2018-08-20 LAB
ANION GAP SERPL CALCULATED.3IONS-SCNC: 8 MMOL/L (ref 4–13)
BASOPHILS # BLD AUTO: 0.03 THOUSANDS/ΜL (ref 0–0.1)
BASOPHILS NFR BLD AUTO: 1 % (ref 0–1)
BUN SERPL-MCNC: 7 MG/DL (ref 5–25)
CALCIUM SERPL-MCNC: 9.2 MG/DL (ref 8.3–10.1)
CHLORIDE SERPL-SCNC: 98 MMOL/L (ref 100–108)
CO2 SERPL-SCNC: 30 MMOL/L (ref 21–32)
CREAT SERPL-MCNC: 0.51 MG/DL (ref 0.6–1.3)
EOSINOPHIL # BLD AUTO: 0.06 THOUSAND/ΜL (ref 0–0.61)
EOSINOPHIL NFR BLD AUTO: 2 % (ref 0–6)
ERYTHROCYTE [DISTWIDTH] IN BLOOD BY AUTOMATED COUNT: 13.8 % (ref 11.6–15.1)
GFR SERPL CREATININE-BSD FRML MDRD: 107 ML/MIN/1.73SQ M
GLUCOSE SERPL-MCNC: 95 MG/DL (ref 65–140)
HCT VFR BLD AUTO: 33 % (ref 34.8–46.1)
HGB BLD-MCNC: 10.8 G/DL (ref 11.5–15.4)
IMM GRANULOCYTES # BLD AUTO: 0.06 THOUSAND/UL (ref 0–0.2)
IMM GRANULOCYTES NFR BLD AUTO: 2 % (ref 0–2)
LYMPHOCYTES # BLD AUTO: 0.87 THOUSANDS/ΜL (ref 0.6–4.47)
LYMPHOCYTES NFR BLD AUTO: 28 % (ref 14–44)
MCH RBC QN AUTO: 29.1 PG (ref 26.8–34.3)
MCHC RBC AUTO-ENTMCNC: 32.7 G/DL (ref 31.4–37.4)
MCV RBC AUTO: 89 FL (ref 82–98)
MONOCYTES # BLD AUTO: 0.46 THOUSAND/ΜL (ref 0.17–1.22)
MONOCYTES NFR BLD AUTO: 15 % (ref 4–12)
NEUTROPHILS # BLD AUTO: 1.66 THOUSANDS/ΜL (ref 1.85–7.62)
NEUTS SEG NFR BLD AUTO: 52 % (ref 43–75)
NRBC BLD AUTO-RTO: 1 /100 WBCS
PLATELET # BLD AUTO: 285 THOUSANDS/UL (ref 149–390)
PMV BLD AUTO: 8.6 FL (ref 8.9–12.7)
POTASSIUM SERPL-SCNC: 3.8 MMOL/L (ref 3.5–5.3)
RBC # BLD AUTO: 3.71 MILLION/UL (ref 3.81–5.12)
SODIUM SERPL-SCNC: 136 MMOL/L (ref 136–145)
WBC # BLD AUTO: 3.14 THOUSAND/UL (ref 4.31–10.16)

## 2018-08-20 PROCEDURE — 97116 GAIT TRAINING THERAPY: CPT

## 2018-08-20 PROCEDURE — 80048 BASIC METABOLIC PNL TOTAL CA: CPT | Performed by: FAMILY MEDICINE

## 2018-08-20 PROCEDURE — 85025 COMPLETE CBC W/AUTO DIFF WBC: CPT | Performed by: FAMILY MEDICINE

## 2018-08-20 PROCEDURE — 99232 SBSQ HOSP IP/OBS MODERATE 35: CPT | Performed by: FAMILY MEDICINE

## 2018-08-20 PROCEDURE — 99238 HOSP IP/OBS DSCHRG MGMT 30/<: CPT | Performed by: FAMILY MEDICINE

## 2018-08-20 PROCEDURE — 97110 THERAPEUTIC EXERCISES: CPT

## 2018-08-20 PROCEDURE — 99232 SBSQ HOSP IP/OBS MODERATE 35: CPT | Performed by: PSYCHIATRY & NEUROLOGY

## 2018-08-20 RX ORDER — FERROUS SULFATE 325(65) MG
325 TABLET ORAL 2 TIMES DAILY
Status: CANCELLED | OUTPATIENT
Start: 2018-08-20

## 2018-08-20 RX ORDER — BENZTROPINE MESYLATE 1 MG/1
1 TABLET ORAL EVERY 6 HOURS PRN
Status: DISCONTINUED | OUTPATIENT
Start: 2018-08-20 | End: 2018-08-21

## 2018-08-20 RX ORDER — PANTOPRAZOLE SODIUM 20 MG/1
20 TABLET, DELAYED RELEASE ORAL
Status: DISCONTINUED | OUTPATIENT
Start: 2018-08-20 | End: 2018-08-21

## 2018-08-20 RX ORDER — TRAZODONE HYDROCHLORIDE 50 MG/1
50 TABLET ORAL
Status: DISCONTINUED | OUTPATIENT
Start: 2018-08-20 | End: 2018-08-29 | Stop reason: HOSPADM

## 2018-08-20 RX ORDER — ACETAMINOPHEN 325 MG/1
650 TABLET ORAL EVERY 6 HOURS PRN
Status: DISCONTINUED | OUTPATIENT
Start: 2018-08-20 | End: 2018-08-20 | Stop reason: SDUPTHER

## 2018-08-20 RX ORDER — LORAZEPAM 1 MG/1
1 TABLET ORAL EVERY 8 HOURS PRN
Status: DISCONTINUED | OUTPATIENT
Start: 2018-08-20 | End: 2018-08-21

## 2018-08-20 RX ORDER — ACETAMINOPHEN 325 MG/1
650 TABLET ORAL EVERY 6 HOURS PRN
Status: DISCONTINUED | OUTPATIENT
Start: 2018-08-20 | End: 2018-08-29 | Stop reason: HOSPADM

## 2018-08-20 RX ORDER — LEVOTHYROXINE SODIUM 0.03 MG/1
25 TABLET ORAL
Status: DISCONTINUED | OUTPATIENT
Start: 2018-08-21 | End: 2018-08-29 | Stop reason: HOSPADM

## 2018-08-20 RX ORDER — AMOXICILLIN 250 MG
1 CAPSULE ORAL
Status: DISCONTINUED | OUTPATIENT
Start: 2018-08-20 | End: 2018-08-29 | Stop reason: HOSPADM

## 2018-08-20 RX ORDER — CALCIUM CARBONATE 200(500)MG
500 TABLET,CHEWABLE ORAL DAILY PRN
Status: CANCELLED | OUTPATIENT
Start: 2018-08-20

## 2018-08-20 RX ORDER — CALCIUM CARBONATE 200(500)MG
500 TABLET,CHEWABLE ORAL DAILY PRN
Status: DISCONTINUED | OUTPATIENT
Start: 2018-08-20 | End: 2018-08-29 | Stop reason: HOSPADM

## 2018-08-20 RX ORDER — PANTOPRAZOLE SODIUM 20 MG/1
20 TABLET, DELAYED RELEASE ORAL
Status: CANCELLED | OUTPATIENT
Start: 2018-08-20

## 2018-08-20 RX ORDER — LACTULOSE 20 G/30ML
20 SOLUTION ORAL 2 TIMES DAILY PRN
Status: DISCONTINUED | OUTPATIENT
Start: 2018-08-20 | End: 2018-08-29 | Stop reason: HOSPADM

## 2018-08-20 RX ORDER — PAROXETINE HYDROCHLORIDE 20 MG/1
10 TABLET, FILM COATED ORAL DAILY
Status: DISCONTINUED | OUTPATIENT
Start: 2018-08-20 | End: 2018-08-20 | Stop reason: HOSPADM

## 2018-08-20 RX ORDER — BENZTROPINE MESYLATE 1 MG/ML
1 INJECTION INTRAMUSCULAR; INTRAVENOUS EVERY 6 HOURS PRN
Status: DISCONTINUED | OUTPATIENT
Start: 2018-08-20 | End: 2018-08-21

## 2018-08-20 RX ORDER — HALOPERIDOL 5 MG/ML
5 INJECTION INTRAMUSCULAR EVERY 6 HOURS PRN
Status: DISCONTINUED | OUTPATIENT
Start: 2018-08-20 | End: 2018-08-21

## 2018-08-20 RX ORDER — LEVOTHYROXINE SODIUM 0.03 MG/1
25 TABLET ORAL
Status: CANCELLED | OUTPATIENT
Start: 2018-08-21

## 2018-08-20 RX ORDER — IBUPROFEN 600 MG/1
600 TABLET ORAL EVERY 8 HOURS PRN
Status: DISCONTINUED | OUTPATIENT
Start: 2018-08-20 | End: 2018-08-29 | Stop reason: HOSPADM

## 2018-08-20 RX ORDER — PAROXETINE HYDROCHLORIDE 20 MG/1
10 TABLET, FILM COATED ORAL DAILY
Status: CANCELLED | OUTPATIENT
Start: 2018-08-21

## 2018-08-20 RX ORDER — DOCUSATE SODIUM 100 MG/1
100 CAPSULE, LIQUID FILLED ORAL 2 TIMES DAILY
Status: DISCONTINUED | OUTPATIENT
Start: 2018-08-21 | End: 2018-08-29 | Stop reason: HOSPADM

## 2018-08-20 RX ORDER — ACETAMINOPHEN 160 MG/5ML
650 SUSPENSION, ORAL (FINAL DOSE FORM) ORAL EVERY 4 HOURS PRN
Status: DISCONTINUED | OUTPATIENT
Start: 2018-08-20 | End: 2018-08-21

## 2018-08-20 RX ORDER — DOCUSATE SODIUM 100 MG/1
100 CAPSULE, LIQUID FILLED ORAL 2 TIMES DAILY
Status: CANCELLED | OUTPATIENT
Start: 2018-08-20

## 2018-08-20 RX ORDER — LORAZEPAM 2 MG/ML
1 INJECTION INTRAMUSCULAR EVERY 6 HOURS PRN
Status: DISCONTINUED | OUTPATIENT
Start: 2018-08-20 | End: 2018-08-21

## 2018-08-20 RX ORDER — HALOPERIDOL 5 MG
5 TABLET ORAL EVERY 6 HOURS PRN
Status: DISCONTINUED | OUTPATIENT
Start: 2018-08-20 | End: 2018-08-21

## 2018-08-20 RX ORDER — PAROXETINE 10 MG/1
10 TABLET, FILM COATED ORAL DAILY
Status: DISCONTINUED | OUTPATIENT
Start: 2018-08-21 | End: 2018-08-29 | Stop reason: HOSPADM

## 2018-08-20 RX ORDER — ACETAMINOPHEN 160 MG/5ML
650 SUSPENSION, ORAL (FINAL DOSE FORM) ORAL EVERY 4 HOURS PRN
Status: CANCELLED | OUTPATIENT
Start: 2018-08-20

## 2018-08-20 RX ORDER — FERROUS SULFATE 325(65) MG
325 TABLET ORAL 2 TIMES DAILY
Status: DISCONTINUED | OUTPATIENT
Start: 2018-08-21 | End: 2018-08-29 | Stop reason: HOSPADM

## 2018-08-20 RX ORDER — LACTULOSE 20 G/30ML
20 SOLUTION ORAL 2 TIMES DAILY PRN
Status: CANCELLED | OUTPATIENT
Start: 2018-08-20

## 2018-08-20 RX ORDER — AMOXICILLIN 250 MG
1 CAPSULE ORAL
Status: CANCELLED | OUTPATIENT
Start: 2018-08-20

## 2018-08-20 RX ADMIN — Medication 325 MG: at 08:31

## 2018-08-20 RX ADMIN — Medication 325 MG: at 16:54

## 2018-08-20 RX ADMIN — LACTULOSE 20 G: 20 SOLUTION ORAL at 15:41

## 2018-08-20 RX ADMIN — PAROXETINE HYDROCHLORIDE 10 MG: 20 TABLET, FILM COATED ORAL at 15:35

## 2018-08-20 RX ADMIN — ENOXAPARIN SODIUM 40 MG: 40 INJECTION SUBCUTANEOUS at 08:31

## 2018-08-20 RX ADMIN — SENNOSIDES AND DOCUSATE SODIUM 1 TABLET: 8.6; 5 TABLET ORAL at 22:05

## 2018-08-20 RX ADMIN — LEVOTHYROXINE SODIUM 25 MCG: 25 TABLET ORAL at 06:27

## 2018-08-20 RX ADMIN — DOCUSATE SODIUM 100 MG: 100 CAPSULE, LIQUID FILLED ORAL at 08:31

## 2018-08-20 RX ADMIN — DOCUSATE SODIUM 100 MG: 100 CAPSULE, LIQUID FILLED ORAL at 16:54

## 2018-08-20 RX ADMIN — LACTULOSE 20 G: 20 SOLUTION ORAL at 08:31

## 2018-08-20 RX ADMIN — PANTOPRAZOLE SODIUM 20 MG: 20 TABLET, DELAYED RELEASE ORAL at 22:05

## 2018-08-20 NOTE — SOCIAL WORK
CM was informed that pt is medically stable for dc today  CM spoke to [facility] crisis worker who states bed is available  CM faxed 12 to [facility]  CM was informed by [facility] that pt was approved by LINDA Forrest for [facility]  Accepting Physician is Dr Flynn Kenny  CM completed [facility] Request Form and faxed to fax# [phone]  [facility] with crisis states pt can be admitted to  7 prior to receiving a call back/precert from [facility] Dione Mcardle will be in contact with Sas[facility] regarding precert  CM called [facility] [phone] for a COB and spoke to [address] [facility] who states COB is pended  [facility] is requesting a call upon arrival to [address]  YASMIN updated [facility] with crisis of same

## 2018-08-20 NOTE — RESTORATIVE TECHNICIAN NOTE
Restorative Specialist Mobility Note       Activity: Other (Comment) (Educated/encouraged pt to ambulate with assistance 3-4 x's/day, pt states she walks in the halls every hour with the PCA on 1:1 )     Assistive Device: None       Gladis DACOSTA, Restorative Technician, [employer]

## 2018-08-20 NOTE — PROGRESS NOTES
Progress Note - Behavioral Health   Nate Juárez 62 y o  female MRN: [id number]  Unit/Bed#: [facility] 727-01 Encounter: [id number]      I came to see the patient for continuation of care, she states that she worries about the situation with her mother and her because her mother is getting demented and she has a lot of bad things including she states that she hates her, despite that she had been her Gaye all her life  This was very difficult for the patient and the for that reason she took the overdose  She states that is very difficult when you look somebody so much and this thing happens  She have very limited support other than her mother  Her she states that she is working to get better  She still feels very depressed  She still feels helpless            Behavior over the last 24 hours:  unchanged  Sleep: normal  Appetite: normal  Medication side effects: No  ROS: constipation    Mental Status Evaluation:  Appearance:  age appropriate   Behavior:  cooperative   Speech:  normal pitch and normal volume   Mood:  anxious and depressed   Affect:  mood-congruent   Language: naming objects and repeating phrases   Thought Process:  goal directed   Associations: intact associations   Thought Content:  normal   Perceptual Disturbances: None   Risk Potential: She still has suicidal thoughts and overdoses  is an alternative   Sensorium:  person, place, time/date, situation and day of week   Memory:  recent and remote memory grossly intact   Cognition:  grossly intact   Consciousness:  alert and awake    Attention: attention span and concentration were age appropriate   Intellect: within normal limits   Fund of Knowledge: awareness of current events: Fair, past history: Fair and vocabulary: Fair   Insight:  limited   Judgment: limited   Muscle Strength and Tone: Within normal limits   Gait/Station: normal gait/station and normal balance   Motor Activity: no abnormal movements         Assessment/Plan  Yaneli YIN Candido Weston is a 62 y o  female with major depressive disorder who was admitted to the hospital secondary to a overdose of multiple medication in suicide attempt  She states that her mother had dementia and had been more difficult every day because the mother's said things that hurt her and that was the reason of the overdose  She still feels very depressed and concerned about the situation with her mother  Diagnosis:  Major depressive disorder severe recurrent without psychotic features F33  2    Recommended Treatment:   Continue medical management  Continue one-to-one observation  Start Paxil 10 mg p o  Daily  Inpatient psychiatric unit when medically cleared and bed available patient is a 201  Discussed with primary team    Medications:   current meds:   Current Facility-Administered Medications   Medication Dose Route Frequency    acetaminophen (TYLENOL) oral suspension 650 mg  650 mg Oral Q4H PRN    calcium carbonate (TUMS) chewable tablet 500 mg  500 mg Oral Daily PRN    diazepam (VALIUM) injection 5 mg  5 mg Intravenous Q4H PRN    docusate sodium (COLACE) capsule 100 mg  100 mg Oral BID    enoxaparin (LOVENOX) subcutaneous injection 40 mg  40 mg Subcutaneous Daily    ferrous sulfate tablet 325 mg  325 mg Oral BID    lactulose 20 g/30 mL oral solution 20 g  20 g Oral BID PRN    levothyroxine tablet 25 mcg  25 mcg Oral Early Morning    pantoprazole (PROTONIX) EC tablet 20 mg  20 mg Oral HS    PARoxetine (PAXIL) tablet 10 mg  10 mg Oral Daily    senna-docusate sodium (SENOKOT S) 8 6-50 mg per tablet 1 tablet  1 tablet Oral HS         Risks, benefits and possible side effects of Medications:     Risks, benefits, and possible side effects of medications explained to patient and patient verbalizes understanding  Labs: I have personally reviewed all pertinent laboratory results           Maida Escobar MD

## 2018-08-20 NOTE — ASSESSMENT & PLAN NOTE
S/p Overdose   - Appreciate Psychiatry input   - 201 signed --> pt is medically cleared for transfer to inpatient psychiatric care once bed available   - Continue 1:1 observation while on Med Surg    -discharge to [place] [id number]   - Continue to hold PTA psychotropics (Geodon, Paxil, Seroquel) until admission to psych unit

## 2018-08-20 NOTE — ASSESSMENT & PLAN NOTE
S/p Overdose   - Appreciate Psychiatry input   - 201 signed --> pt is medically cleared for transfer to inpatient psychiatric care once bed available   - Continue 1:1 observation while on Med Surg    - Continue to hold PTA psychotropics (Geodon, Paxil, Seroquel)

## 2018-08-20 NOTE — DISCHARGE SUMMARY
Discharge- Brenda Slight 1960, 62 y o  female MRN: [id number]    Unit/Bed#: [facility] 727-01 Encounter: [id number]    Primary Care Provider: Stephany Ibarra MD   Date and time admitted to hospital: 8/13/2018  6:51 AM      * Major depressive disorder, recurrent severe without psychotic features ([facility] [address] )   Assessment & Plan    S/p Overdose   - Appreciate Psychiatry input   - 201 signed --> pt is medically cleared for transfer to inpatient psychiatric care once bed available   - Continue 1:1 observation while on Med Surg    -discharge to [facility]   - Continue to hold PTA psychotropics (Geodon, Paxil, Seroquel) until admission to psych unit         Leukopenia   Assessment & Plan    WBC 9 9 on admission, initially trending down, now improving to 3 14 today with normal ANC   - Pt likely has lower WBC at baseline        Serotonin syndrome   Assessment & Plan    RESOLVED --> pt has remained hemodynamically stable and has not required any benzodiazepine since 8/16        Hypothyroidism   Assessment & Plan    TSH 3 28 (this admission)  - No evidence to suggest thyrotoxicosis   - Continue PTA Levothyroxine 25 mcg daily         Hyponatremia   Assessment & Plan    Na 112 on admission , most recent 136  - Overall stable since discontinuing IV fluids on 8/18  - Continue 1800 mL fluid restriction               Resolved Problems  Date Reviewed: 8/19/2018          Resolved    Delirium 8/18/2018     Resolved by  Mary Morgan MD    Acute respiratory failure ([facility] [address] ) 8/16/2018     Resolved by  Saleem Chow PA-C    Status epilepticus ([facility] ) 8/16/2018     Resolved by  Pablito Plascencia DO    Metabolic acidosis, increased anion gap 8/16/2018     Resolved by  Saleem Chow PA-C    Hyperthermia 8/16/2018     Resolved by  Saleem Chow PA-C    Tachycardia 8/18/2018     Resolved by  Mary Morgan MD    Rhabdomyolysis 8/18/2018     Resolved by  Mary Morgan MD    Hypophosphatemia 8/20/2018     Resolved by  Kary Walsh Mintus, DO    Seizure ([facility] [address] ) 8/16/2018     Resolved by  Arthur Luis PA-C    Overdose 8/16/2018     Resolved by  Arthur Luis PA-C    Hypocalcemia 8/20/2018     Resolved by  Vanessa Worthy DO    Hypokalemia 8/20/2018     Resolved by  Vanessa Worthy DO          Admission Date:   Admission Orders     Ordered        08/13/18 0937  Inpatient Admission (expected length of stay for this patient is greater than two midnights)  Once               Admitting Diagnosis: Clonus [R25 8]  Hyponatremia [E87 1]  Overdose [T50 901A]  Seizure ([facility] [address] ) [R56 9]  Overdose of antipsychotic [T43 501A]    HPI & summary of hospital course: 63 yo F with PMHx of MDD with psychotic features, schizoaffective d/o, anxiety, bulimia nervosa, leukopenia, DDD, lumbar radiculopathy, HTN and HLD present to ED on 8/13/18 with altered mental status  Pt revealed to her mother on the morning of 8/13/18 that she took 3 extra Geodon  Family reveals that pt is also on Seroquel and Paxil  There was a high suspicion that pt has taken additional medication  Pt has multiple recent history of attempting to contact PCP for treatment for back pain  In the ED, patient had temperature of 100 8, was tachycardia and had a witnessed seizure  Pt was intubated and sedated on propofol  Pt continue to have seizure activity and propofol was increased with additional doses of 20mg diazepam    Toxicology was consulted and pt was diagnosed with serotonin syndrome  In the ICU patient was sedated and ventilated on 50mg per kg per/minute of propofol  Pt was also found to have hyponatremia and was given hypertonic saline 150ml which improved to 115  Pt continued to be on normal NS and hyponatremia continue to improve  Pt was extubated in 8/15/18 and transferred out of the MICU  Pt's serotonin resolved by the time she was transferred out of MICU  Pt's hyponatremia continue to improve even after discontinuation of IV NS   Per psychiatry, pt's psych meds were held until admission to the psych floor  Pt is leukopenic, currently 3 14 with normal ANC  Pt's WBC is trending up and she does not have sign of active infection at this time  On 8/20/18, Pt is medically cleared for discharge to [facility]  Pt is 201 and will continue treatment for her psychiatric condition and SI in the psychiatric floor  Procedures Performed:   Orders Placed This Encounter   Procedures    ED ECG Documentation Only    ED ECG Documentation Only    Intubation       Significant Findings, Care, Treatment and Services Provided:   CMP 8/13/18: Na 112  CT head wo contrast 8/13/18: no acute intracranial abnormality  CXR portable 8/14/18: minimal subsegmental atelectasis right perihilar region  EKG 8/15/18: sinus tachycardia with inverted T waves in AVR and V1    Complications: none    Condition at Discharge: fair     Discharge instructions/Information to patient and family:   See after visit summary for information provided to patient and family  Provisions for Follow-Up Care:  See after visit summary for information related to follow-up care and any pertinent home health orders  PCP: Thea Corbin MD    Disposition: inpatient psych    Planned Readmission: No    Discharge Statement   I spent 30 minutes discharging the patient  This time was spent on the day of discharge  I had direct contact with the patient on the day of discharge  Additional documentation is required if more than 30 minutes were spent on discharge  Discharge Medications:  See after visit summary for reconciled discharge medications provided to patient and family  IVAN Trejo    Family Medicine, PGY-1

## 2018-08-20 NOTE — SOCIAL WORK
Message left for Ashley with [facility]  to inform of pt's dc to [facility] today  Message left for Willi--pt's mother's  to inform of pt's dc to [facility] today

## 2018-08-20 NOTE — ASSESSMENT & PLAN NOTE
TSH 3 28 (this admission)  - No evidence to suggest thyrotoxicosis   - Continue PTA Levothyroxine 25 mcg daily

## 2018-08-20 NOTE — ASSESSMENT & PLAN NOTE
Na 112 on admission   - Overall stable since discontinuing IV fluids on 8/18  - Continue 1800 mL fluid restriction

## 2018-08-20 NOTE — ASSESSMENT & PLAN NOTE
WBC 9 9 on admission, initially trending down, now improving to 3 14 today with normal ANC   - Pt likely has lower WBC at baseline

## 2018-08-20 NOTE — PROGRESS NOTES
Progress Note - Jignesh Shows 1960, 62 y o  female MRN: [id number]    Unit/Bed#: [facility] 727-01 Encounter: [id number]    Primary Care Provider: Tash Tavares MD   Date and time admitted to hospital: 8/13/2018  6:51 AM        * Major depressive disorder, recurrent severe without psychotic features ([facility] [address] )   Assessment & Plan    S/p Overdose   - Appreciate Psychiatry input   - 201 signed --> pt is medically cleared for transfer to inpatient psychiatric care once bed available   - Continue 1:1 observation while on Med Surg    - Continue to hold PTA psychotropics (Geodon, Paxil, Seroquel)        Serotonin syndrome   Assessment & Plan    RESOLVED --> pt has remained hemodynamically stable and has not required any benzodiazepine since 8/16        Hyponatremia   Assessment & Plan    Na 112 on admission   - Overall stable since discontinuing IV fluids on 8/18  - Continue 1800 mL fluid restriction         Leukopenia   Assessment & Plan    WBC 9 9 on admission, initially trending down, now improving to 3 14 today with normal ANC   - Pt may have a lower WBC at baseline        Hypothyroidism   Assessment & Plan    TSH 3 28 (this admission)  - No evidence to suggest thyrotoxicosis   - Continue PTA Levothyroxine 25 mcg daily           Diet: Regular, 1800 mL fluid restriction  DVT PPx: Lovenox, SCDs  Code: Full  Dispo: Medically stable, awaiting placement on inpatient psychiatric unit under 201  Plan will be discussed with Dr Jake Gonzalez and [facility] Team    Subjective:   Pt seen and examined at bedside  No acute concerns  Pt states she has been tolerating PO without issue  She was able to have  BM yesterday, though feels that she is still a bit "blocked up"  She has been using her incentive spirometer and ambulating in the solorio every hour  She is looking forward to transfer to the psychiatric unit, as she had two great conversations with Dr Mela Kenney and would like to continue   She states she "just wanted to feel better" when she took her extra medications      Objective:   Current Facility-Administered Medications   Medication Dose Route Frequency    acetaminophen (TYLENOL) oral suspension 650 mg  650 mg Oral Q4H PRN    calcium carbonate (TUMS) chewable tablet 500 mg  500 mg Oral Daily PRN    diazepam (VALIUM) injection 5 mg  5 mg Intravenous Q4H PRN    docusate sodium (COLACE) capsule 100 mg  100 mg Oral BID    enoxaparin (LOVENOX) subcutaneous injection 40 mg  40 mg Subcutaneous Daily    ferrous sulfate tablet 325 mg  325 mg Oral BID    lactulose 20 g/30 mL oral solution 20 g  20 g Oral BID PRN    levothyroxine tablet 25 mcg  25 mcg Oral Early Morning    pantoprazole (PROTONIX) EC tablet 20 mg  20 mg Oral HS    senna-docusate sodium (SENOKOT S) 8 6-50 mg per tablet 1 tablet  1 tablet Oral HS     Allergies   Allergen Reactions    Latex     Risperdal [Risperidone]     Zyprexa [Olanzapine]        Labs:  WBC 3 14, ANC 1 66  Hb 10 8  Ply 285    Na 136  K 3 8  Cl 98  Cr 0 51    Imaging/Other: No new imaging        Vitals:  Vitals:    08/19/18 0818 08/19/18 1535 08/19/18 2247 08/20/18 0721   BP: 127/76 134/83 149/87 144/78   BP Location: Right arm Right arm Right arm Right arm   Pulse: 88 82 74 80   Resp: 18 18 16 18   Temp: 98 8 °F (37 1 °C) 98 7 °F (37 1 °C) 98 5 °F (36 9 °C) 98 5 °F (36 9 °C)   TempSrc: Oral Oral Oral Oral   SpO2: 97% 99% 96% 97%   Weight:       Height:             Intake/Output Summary (Last 24 hours) at 08/20/18 0807  Last data filed at 08/20/18 0255   Gross per 24 hour   Intake             1369 ml   Output             1150 ml   Net              219 ml       Physical Exam:   General: No acute distress  HEENT  Head: NC/AT  Mouth: Mucus membranes moist  Cardio: Normal S1 S2, regular rate and rhythm  Resp: Good respiratory effort, lungs clear to auscultation bilaterally  Abdomen: Soft, non distended, non tender to palpation, normal active bowel sounds  Extremities: No LE edema  Skin: Warm, dry, no rash  Neuro: Alert and oriented, no gross deficits  Psych:  In good spirits, looking forward to working with the psychiatric team     Invasive Devices     Peripheral Intravenous Line            Peripheral IV 08/17/18 Right Hand 2 days                  Pablito Plascencia DO  Family Medicine  PGY-3  08/20/18 8:07 AM

## 2018-08-20 NOTE — ASSESSMENT & PLAN NOTE
Na 112 on admission , most recent 136  - Overall stable since discontinuing IV fluids on 8/18  - Continue 1800 mL fluid restriction

## 2018-08-20 NOTE — ASSESSMENT & PLAN NOTE
RESOLVED --> pt has remained hemodynamically stable and has not required any benzodiazepine since 8/16

## 2018-08-20 NOTE — PHYSICAL THERAPY NOTE
Physical Therapy Progress Note     08/20/18 1456   Pain Assessment   Pain Assessment No/denies pain   Pain Score No Pain   Restrictions/Precautions   Weight Bearing Precautions Per Order No   Other Precautions 1:1;Cognitive; Chair Alarm; Bed Alarm; Fall Risk   General   Chart Reviewed Yes   Response to Previous Treatment Patient with no complaints from previous session  Family/Caregiver Present No   Cognition   Overall Cognitive Status WFL   Arousal/Participation Alert   Attention Attends with cues to redirect   Orientation Level Oriented X4   Memory Decreased recall of precautions   Following Commands Follows multistep commands with increased time or repetition   Subjective   Subjective denies pain; no c/o   Bed Mobility   Supine to Sit 4  Minimal assistance   Additional items Assist x 1   Transfers   Sit to Stand 4  Minimal assistance   Additional items Assist x 1   Stand to Sit 4  Minimal assistance   Additional items Assist x 2   Stand pivot 4  Minimal assistance   Additional items Assist x 1   Toilet transfer 4  Minimal assistance   Additional items Assist x 1   Ambulation/Elevation   Gait pattern WNL   Gait Assistance 4  Minimal assist   Assistive Device Rolling walker   Distance 80   Balance   Static Sitting Good   Dynamic Sitting Fair +   Static Standing Fair   Dynamic Standing Fair   Ambulatory Fair -   Endurance Deficit   Endurance Deficit Yes   Endurance Deficit Description fatigued   Activity Tolerance   Activity Tolerance Patient limited by fatigue   Nurse Made Aware yes   Exercises   THR Standing;Bilateral   TKR Standing;Bilateral   Assessment   Prognosis Good   Problem List Decreased strength;Decreased endurance; Impaired balance;Decreased mobility; Decreased coordination   Assessment Pt min/modA for bed mobility L/R and xfer supine <> supine EOB; STS/PT with S and no device; up/down stairs x14 with S and R HR; instructed in standing TE (as above) finished session seated in bedside recliner with all needs in reach, 1:1 present and alarms active; recommend cont PT POC:    Goals   Patient Goals none stated   STG Expiration Date 08/31/18   Treatment Day 1   Plan   Treatment/Interventions ADL retraining;Functional transfer training;LE strengthening/ROM; Elevations; Therapeutic exercise; Endurance training;Cognitive reorientation;Patient/family training;Equipment eval/education; Bed mobility;Gait training   Progress Progressing toward goals   PT Frequency Other (Comment)  (3-5x/week)   Recommendation   Recommendation Other (Comment)  (d/c to IP psych)   Equipment Recommended Adrianne Lowe   PT - OK to Discharge Yes  (to IP psych when medically cleared)     Sebastian Chen, PTA

## 2018-08-21 ENCOUNTER — TELEPHONE (OUTPATIENT)
Dept: FAMILY MEDICINE CLINIC | Facility: CLINIC | Age: 58
End: 2018-08-21

## 2018-08-21 DIAGNOSIS — M81.6 LOCALIZED OSTEOPOROSIS WITHOUT CURRENT PATHOLOGICAL FRACTURE: ICD-10-CM

## 2018-08-21 PROBLEM — F25.1 SCHIZOAFFECTIVE DISORDER, DEPRESSIVE TYPE (HCC): Chronic | Status: ACTIVE | Noted: 2018-08-16

## 2018-08-21 PROBLEM — F33.2 MAJOR DEPRESSIVE DISORDER, RECURRENT SEVERE WITHOUT PSYCHOTIC FEATURES (HCC): Chronic | Status: ACTIVE | Noted: 2018-08-16

## 2018-08-21 PROBLEM — F41.1 GAD (GENERALIZED ANXIETY DISORDER): Chronic | Status: ACTIVE | Noted: 2018-08-21

## 2018-08-21 LAB
ALBUMIN SERPL BCP-MCNC: 3.2 G/DL (ref 3.5–5)
ALP SERPL-CCNC: 66 U/L (ref 46–116)
ALT SERPL W P-5'-P-CCNC: 30 U/L (ref 12–78)
ANION GAP SERPL CALCULATED.3IONS-SCNC: 6 MMOL/L (ref 4–13)
AST SERPL W P-5'-P-CCNC: 21 U/L (ref 5–45)
BASOPHILS # BLD AUTO: 0.02 THOUSANDS/ΜL (ref 0–0.1)
BASOPHILS NFR BLD AUTO: 1 % (ref 0–1)
BILIRUB SERPL-MCNC: 0.31 MG/DL (ref 0.2–1)
BUN SERPL-MCNC: 8 MG/DL (ref 5–25)
CALCIUM SERPL-MCNC: 8.8 MG/DL (ref 8.3–10.1)
CHLORIDE SERPL-SCNC: 94 MMOL/L (ref 100–108)
CHOLEST SERPL-MCNC: 205 MG/DL (ref 50–200)
CO2 SERPL-SCNC: 30 MMOL/L (ref 21–32)
CREAT SERPL-MCNC: 0.58 MG/DL (ref 0.6–1.3)
EOSINOPHIL # BLD AUTO: 0.05 THOUSAND/ΜL (ref 0–0.61)
EOSINOPHIL NFR BLD AUTO: 1 % (ref 0–6)
ERYTHROCYTE [DISTWIDTH] IN BLOOD BY AUTOMATED COUNT: 14.1 % (ref 11.6–15.1)
GFR SERPL CREATININE-BSD FRML MDRD: 103 ML/MIN/1.73SQ M
GLUCOSE P FAST SERPL-MCNC: 95 MG/DL (ref 65–99)
GLUCOSE SERPL-MCNC: 95 MG/DL (ref 65–140)
HCT VFR BLD AUTO: 33.9 % (ref 34.8–46.1)
HDLC SERPL-MCNC: 38 MG/DL (ref 40–60)
HGB BLD-MCNC: 11.3 G/DL (ref 11.5–15.4)
IMM GRANULOCYTES # BLD AUTO: 0.05 THOUSAND/UL (ref 0–0.2)
IMM GRANULOCYTES NFR BLD AUTO: 1 % (ref 0–2)
LDLC SERPL CALC-MCNC: 150 MG/DL (ref 0–100)
LYMPHOCYTES # BLD AUTO: 0.69 THOUSANDS/ΜL (ref 0.6–4.47)
LYMPHOCYTES NFR BLD AUTO: 18 % (ref 14–44)
MCH RBC QN AUTO: 29.5 PG (ref 26.8–34.3)
MCHC RBC AUTO-ENTMCNC: 33.3 G/DL (ref 31.4–37.4)
MCV RBC AUTO: 89 FL (ref 82–98)
MONOCYTES # BLD AUTO: 0.55 THOUSAND/ΜL (ref 0.17–1.22)
MONOCYTES NFR BLD AUTO: 14 % (ref 4–12)
NEUTROPHILS # BLD AUTO: 2.49 THOUSANDS/ΜL (ref 1.85–7.62)
NEUTS SEG NFR BLD AUTO: 65 % (ref 43–75)
NONHDLC SERPL-MCNC: 167 MG/DL
NRBC BLD AUTO-RTO: 0 /100 WBCS
PLATELET # BLD AUTO: 324 THOUSANDS/UL (ref 149–390)
PMV BLD AUTO: 8.3 FL (ref 8.9–12.7)
POTASSIUM SERPL-SCNC: 3.7 MMOL/L (ref 3.5–5.3)
PROT SERPL-MCNC: 6.6 G/DL (ref 6.4–8.2)
RBC # BLD AUTO: 3.83 MILLION/UL (ref 3.81–5.12)
SODIUM SERPL-SCNC: 130 MMOL/L (ref 136–145)
T3FREE SERPL-MCNC: 2.47 PG/ML (ref 2.3–4.2)
T4 FREE SERPL-MCNC: 1.01 NG/DL (ref 0.76–1.46)
TRIGL SERPL-MCNC: 86 MG/DL
TSH SERPL DL<=0.05 MIU/L-ACNC: 2.72 UIU/ML (ref 0.36–3.74)
WBC # BLD AUTO: 3.85 THOUSAND/UL (ref 4.31–10.16)

## 2018-08-21 PROCEDURE — 84439 ASSAY OF FREE THYROXINE: CPT | Performed by: PSYCHIATRY & NEUROLOGY

## 2018-08-21 PROCEDURE — 85025 COMPLETE CBC W/AUTO DIFF WBC: CPT | Performed by: PSYCHIATRY & NEUROLOGY

## 2018-08-21 PROCEDURE — 99222 1ST HOSP IP/OBS MODERATE 55: CPT | Performed by: PSYCHIATRY & NEUROLOGY

## 2018-08-21 PROCEDURE — 80061 LIPID PANEL: CPT | Performed by: PSYCHIATRY & NEUROLOGY

## 2018-08-21 PROCEDURE — 80053 COMPREHEN METABOLIC PANEL: CPT | Performed by: PSYCHIATRY & NEUROLOGY

## 2018-08-21 PROCEDURE — 84443 ASSAY THYROID STIM HORMONE: CPT | Performed by: PSYCHIATRY & NEUROLOGY

## 2018-08-21 PROCEDURE — 84481 FREE ASSAY (FT-3): CPT | Performed by: PSYCHIATRY & NEUROLOGY

## 2018-08-21 RX ORDER — LORAZEPAM 2 MG/ML
1 INJECTION INTRAMUSCULAR EVERY 6 HOURS PRN
Status: DISCONTINUED | OUTPATIENT
Start: 2018-08-21 | End: 2018-08-29 | Stop reason: HOSPADM

## 2018-08-21 RX ORDER — FLUTICASONE PROPIONATE 50 MCG
1 SPRAY, SUSPENSION (ML) NASAL DAILY
Status: DISCONTINUED | OUTPATIENT
Start: 2018-08-21 | End: 2018-08-21

## 2018-08-21 RX ORDER — BENZTROPINE MESYLATE 1 MG/1
1 TABLET ORAL EVERY 6 HOURS PRN
Status: DISCONTINUED | OUTPATIENT
Start: 2018-08-21 | End: 2018-08-29 | Stop reason: HOSPADM

## 2018-08-21 RX ORDER — PANTOPRAZOLE SODIUM 40 MG/1
40 TABLET, DELAYED RELEASE ORAL
Status: DISCONTINUED | OUTPATIENT
Start: 2018-08-21 | End: 2018-08-29 | Stop reason: HOSPADM

## 2018-08-21 RX ORDER — LORAZEPAM 1 MG/1
1 TABLET ORAL EVERY 8 HOURS PRN
Status: DISCONTINUED | OUTPATIENT
Start: 2018-08-21 | End: 2018-08-29 | Stop reason: HOSPADM

## 2018-08-21 RX ORDER — HALOPERIDOL 5 MG/ML
5 INJECTION INTRAMUSCULAR EVERY 6 HOURS PRN
Status: DISCONTINUED | OUTPATIENT
Start: 2018-08-21 | End: 2018-08-29 | Stop reason: HOSPADM

## 2018-08-21 RX ORDER — BENZTROPINE MESYLATE 1 MG/ML
1 INJECTION INTRAMUSCULAR; INTRAVENOUS EVERY 6 HOURS PRN
Status: DISCONTINUED | OUTPATIENT
Start: 2018-08-21 | End: 2018-08-29 | Stop reason: HOSPADM

## 2018-08-21 RX ORDER — HALOPERIDOL 5 MG
5 TABLET ORAL EVERY 6 HOURS PRN
Status: DISCONTINUED | OUTPATIENT
Start: 2018-08-21 | End: 2018-08-29 | Stop reason: HOSPADM

## 2018-08-21 RX ORDER — ACETAMINOPHEN 325 MG/1
975 TABLET ORAL EVERY 6 HOURS PRN
Status: DISCONTINUED | OUTPATIENT
Start: 2018-08-21 | End: 2018-08-29 | Stop reason: HOSPADM

## 2018-08-21 RX ORDER — QUETIAPINE FUMARATE 100 MG/1
100 TABLET, FILM COATED ORAL
Status: DISCONTINUED | OUTPATIENT
Start: 2018-08-21 | End: 2018-08-22

## 2018-08-21 RX ADMIN — Medication 325 MG: at 08:30

## 2018-08-21 RX ADMIN — Medication 325 MG: at 18:09

## 2018-08-21 RX ADMIN — QUETIAPINE FUMARATE 100 MG: 100 TABLET ORAL at 21:22

## 2018-08-21 RX ADMIN — PANTOPRAZOLE SODIUM 40 MG: 40 TABLET, DELAYED RELEASE ORAL at 21:22

## 2018-08-21 RX ADMIN — DOCUSATE SODIUM 100 MG: 100 CAPSULE, LIQUID FILLED ORAL at 18:10

## 2018-08-21 RX ADMIN — SENNOSIDES AND DOCUSATE SODIUM 1 TABLET: 8.6; 5 TABLET ORAL at 21:22

## 2018-08-21 RX ADMIN — DOCUSATE SODIUM 100 MG: 100 CAPSULE, LIQUID FILLED ORAL at 08:30

## 2018-08-21 RX ADMIN — PAROXETINE HYDROCHLORIDE 10 MG: 10 TABLET, FILM COATED ORAL at 08:30

## 2018-08-21 RX ADMIN — LEVOTHYROXINE SODIUM 25 MCG: 25 TABLET ORAL at 06:10

## 2018-08-21 NOTE — PLAN OF CARE
Problem: SELF HARM/SUICIDALITY  Goal: Will have no self-injury during hospital stay  INTERVENTIONS:  - Q 15 MINUTES: Routine safety checks  - Q WAKING SHIFT & PRN: Assess risk to determine if routine checks are adequate to maintain patient safety  - Encourage patient to participate actively in care by formulating a plan to combat response to suicidal ideation, identify supports and resources   Outcome: Progressing      Problem: DEPRESSION  Goal: Will be euthymic at discharge  INTERVENTIONS:  - Administer medication as ordered  - Provide emotional support via 1:1 interaction with staff  - Encourage involvement in milieu/groups/activities  - Monitor for social isolation   Outcome: Progressing      Problem: DISCHARGE PLANNING  Goal: Discharge to home or other facility with appropriate resources  INTERVENTIONS:  - Identify barriers to discharge w/patient and caregiver  - Arrange for needed discharge resources and transportation as appropriate  - Identify discharge learning needs (meds, wound care, etc )  - Refer to Case Management Department for coordinating discharge planning if the patient needs post-hospital services based on physician/advanced practitioner order or complex needs related to functional status, cognitive ability, or social support system   Outcome: Progressing
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
## 2018-08-21 NOTE — PROGRESS NOTES
Pt is denying all s/s  Pt states she took the medication because she was feeling stressed about what her mother and took more to feel better  PT claims this was not a suicide attempt  Pt told RN that she thinks her mother has dementia but it was never diagnosed
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
## 2018-08-21 NOTE — MEDICAL STUDENT
MEDICAL STUDENT: NOT LEGAL DOCUMENT, FOR TRAINING PURPOSES ONLY  Psychiatric Evaluation - 6 [facility] [place] 62 y o  female MRN: [id number]  Unit/Bed#: HZ7 768-02 Encounter: [id number]    Assessment/Plan   Principal Problem:    Major depressive disorder, recurrent severe without psychotic features ([facility] [address] )  Active Problems:    Bulimia nervosa in remission    ABIMAEL (generalized anxiety disorder)    Plan:   1  Reinstate prior medications: Seroquel 100mg at bedtime and titrate and Paxil 10mg daily and titrate  2  Group therapy      Chief Complaint:" I took a lot of medications so I could feel better"     History of Present Illness     Patient is a 62 y o  female with history of major depressive disorder and anxiety was admitted to the inpatient psychiatric unit on a voluntary 201 due to depression and suicide attempt by overdose on her medications (Geodon, Paxil, and Lorazepam)  Primary complaints include: anxiety, chronic pain, worsening depression and difficulty sleeping  Onset of symptoms was gradual starting 6 months ago with gradually worsening course since that time  Psychosocial Stressors: family relations specifically mother's recent dementia progression and recent hospitalization  Patient reports that 2 weeks ago her mother started saying that she is a  "piece of shit  I hate you" which caused her to become more depressed  2 days after this incident, she decided to take more of her medications to "feel better " Gogo Lewis was brought to the ED by EMS after this overdose and had seizure activity  She was eventually intubated and admitted to the MICU  She was seen by psychiatric consultation services after she was medically stable  Today, Gogo Lewis reports that she is mostly unaware of the events prior to the overdose and was surprised that she had a seizure  She states that she had no suicidal ideations at the time   She denies any past auditory or visual hallucinations or homicidal ideations  She does admit to feeling depressed and anxious and easily depressed by her environment such as reading a book or watching the news  Patient is focused on going back home to take care of her mother  She exhibits odd behavior, perseverates on past events and repeats herself several times, and slightly pressured speech  Psychiatric Review Of Systems:  sleep: yes  appetite changes: no  weight changes: yes, has gained 60+ pounds in the past 2 years  energy/anergy: no  interest/pleasure/anhedonia: yes  somatic symptoms: no  anxiety/panic: yes  rody: no  guilty/hopeless: no  self injurious behavior/risky behavior: no    Historical Information     Past Psychiatric History: At least 10 inpatient psychiatric admissions due to major depression disorder, last admission was at least 10 years ago  Facilities include [facility]   Currently in treatment with Dr Wilian Saini at [facility]   Past Suicide attempts: 1x cut wrist with broken ceramic 37 years ago  Past Violent behavior: none  Past Psychiatric medication trial: Paxil, risperidone, Tofranil, Zyprexa, Seroquel, Geodon, and Ativan    Substance Abuse History:  Social History     Tobacco History     Smoking Status  Never Smoker    Smokeless Tobacco Use  Never Used          Alcohol History     Alcohol Use Status  No          Drug Use     Drug Use Status  No          Sexual Activity     Sexually Active  Not Asked          Activities of Daily Living    Not Asked                 Family Psychiatric History:   Psychiatric Illness Mother  Illness: depression   No substance abuse  No suicide attempts        Social History:  Education: PhD in statistical behavioral analysis  Learning Disabilities: none  Marital history: single  Living arrangement, social support: Family relationship issues: Mom is major support system which is unstable due to onset of dementia    Occupational History: on permanent disability  Functioning Relationships: poor support system    Other Pertinent History: None      Traumatic History:   Abuse: none  Other Traumatic Events: none    Past Medical History:   Diagnosis Date    Anxiety     Back pain at L4-L5 level     Schreiber esophagus     Bulimia     Disease of thyroid gland     hypothyroid    GERD (gastroesophageal reflux disease)     Hyperlipidemia     Hypothyroidism     Leukopenia     Sciatica     Seizure (HCC)     Synovial cyst of lumbar spine     Vertigo     Weight gain        Medical Review Of Systems:  Gastrointestinal: positive for GERD  Musculoskeletal:positive for back pain  Behavioral/Psych: negative except for anxiety, depression and mood swings    Meds/Allergies   all current active meds have been reviewed   Current Facility-Administered Medications on File Prior to Encounter   Medication Dose Route Frequency Provider Last Rate Last Dose    [DISCONTINUED] acetaminophen (TYLENOL) oral suspension 650 mg  650 mg Oral Q4H PRN Saul Sen PA-C        [DISCONTINUED] calcium carbonate (TUMS) chewable tablet 500 mg  500 mg Oral Daily PRN Fracisco Andrews MD   500 mg at 08/17/18 1251    [DISCONTINUED] diazepam (VALIUM) injection 5 mg  5 mg Intravenous Q4H PRN Mohini Montgomery MD   5 mg at 08/16/18 0153    [DISCONTINUED] docusate sodium (COLACE) capsule 100 mg  100 mg Oral BID Maria Fernanda Coleman DO   100 mg at 08/20/18 1654    [DISCONTINUED] enoxaparin (LOVENOX) subcutaneous injection 40 mg  40 mg Subcutaneous Daily Saul Sen PA-C   40 mg at 08/20/18 0831    [DISCONTINUED] ferrous sulfate tablet 325 mg  325 mg Oral BID Usman Thornton MD   325 mg at 08/20/18 1654    [DISCONTINUED] lactulose 20 g/30 mL oral solution 20 g  20 g Oral BID PRN Дмитрий Mayes MD   20 g at 08/20/18 1541    [DISCONTINUED] levothyroxine tablet 25 mcg  25 mcg Oral Early Morning Saul Sen PA-C   25 mcg at 08/20/18 0627    [DISCONTINUED] pantoprazole (PROTONIX) EC tablet 20 mg 20 mg Oral HS Indio Peraza MD   20 mg at 08/19/18 2144    [DISCONTINUED] PARoxetine (PAXIL) tablet 10 mg  10 mg Oral Daily Lattie Hatchet, MD   10 mg at 08/20/18 1535    [DISCONTINUED] senna-docusate sodium (SENOKOT S) 8 6-50 mg per tablet 1 tablet  1 tablet Oral HS Bebe CORY Neal   1 tablet at 08/19/18 2144     Current Outpatient Prescriptions on File Prior to Encounter   Medication Sig Dispense Refill    alendronate (FOSAMAX) 70 mg tablet Take 1 tablet (70 mg total) by mouth every 7 days for 90 days 12 tablet 2    bisacodyl (DULCOLAX) 5 mg EC tablet Take by mouth      calcium carbonate-vitamin D (OSCAL-D) 500 mg-200 units per tablet Take 1 tablet by mouth 2 (two) times a day with meals for 30 days 60 tablet 2    diclofenac potassium (CATAFLAM) 50 mg tablet TAKE 1 TABLET BY MOUTH TWICE A DAY AS NEEDED 40 tablet 0    docusate sodium (COLACE) 100 mg capsule Take 1 capsule by mouth 2 (two) times a day      ferrous sulfate 325 (65 Fe) mg tablet Take 1 tablet (325 mg total) by mouth 2 (two) times a day for 90 days 180 tablet 0    fluticasone (FLONASE) 50 mcg/act nasal spray 1 spray into each nostril daily for 14 days 1 Bottle 0    levothyroxine 25 mcg tablet Take 25 mcg by mouth daily   levothyroxine 25 mcg tablet Take 1 tablet by mouth daily      levothyroxine 25 mcg tablet TAKE 1 TABLET EVERY DAY 30 tablet 2    LORazepam (ATIVAN) 2 mg tablet Take 2 mg by mouth every 6 (six) hours as needed for anxiety   LORazepam (ATIVAN) 2 mg tablet Take 1 tablet by mouth 4 (four) times a day      Methylprednisolone 4 MG TBPK 24mg PO on day 1, then decrease by 4mg/day x 5 days per dose pack instructions  21 tablet 0    omeprazole (PriLOSEC) 40 MG capsule Take 40 mg by mouth 2 (two) times a day   omeprazole (PriLOSEC) 40 MG capsule Take 80 mg by mouth daily at bedtime        omeprazole (PriLOSEC) 40 MG capsule Take 1 capsule by mouth 2 (two) times a day      omeprazole (PriLOSEC) 40 MG capsule TAKE 1 CAPSULE TWICE DAILY  60 capsule 5    PARoxetine (PAXIL) 30 mg tablet Take 30 mg by mouth every morning   PARoxetine (PAXIL) 30 mg tablet Take 1 tablet by mouth 3 (three) times a day      QUEtiapine (SEROquel) 200 mg tablet Take 200 mg by mouth 2 (two) times a day          QUEtiapine (SEROQUEL) 200 mg tablet Take 2 tablets by mouth daily at bedtime      QUEtiapine (SEROquel) 400 MG tablet Take 400 mg by mouth daily at bedtime  5    triamcinolone (KENALOG) 0 1 % cream       ziprasidone (GEODON) 80 mg capsule Take 80 mg by mouth daily        ziprasidone (GEODON) 80 mg capsule Take 2 capsules by mouth daily at bedtime         Allergies   Allergen Reactions    Latex     Risperdal [Risperidone]     Zyprexa [Olanzapine]        Objective   Vital signs in last 24 hours:  Temp:  [98 °F (36 7 °C)-99 8 °F (37 7 °C)] 98 °F (36 7 °C)  HR:  [100-120] 104  Resp:  [16-18] 16  BP: (133-139)/(80-90) 137/83    No intake or output data in the 24 hours ending 08/21/18 1644    Mental Status Evaluation:  Appearance:  overweight and younger than stated age   Behavior:  restless and fidgety   Speech:  normal pitch, normal volume and slightly pressured   Mood:  anxious and constricted   Affect:  mood-congruent   Language: naming objects and repeating phrases   Thought Process:  perserverative    Thought Content:  normal   Perceptual Disturbances: None   Risk Potential: none   Sensorium:  person, place, time/date and year   Cognition:  grossly intact   Consciousness:  alert    Attention: attention span and concentration were age appropriate   Intellect: within normal limits   Fund of Knowledge: awareness of current events: fair, past history: fair and vocabulary: fair   Insight:  limited   Judgment: fair   Muscle Strength and Tone: within normal limits   Gait/Station: limping due to sciatic pain   Motor Activity: no abnormal movements     Lab Results:   CBC:   Lab Results   Component Value Date    WBC 3 85 (L) 08/21/2018    HGB 11 3 (L) 08/21/2018    HCT 33 9 (L) 08/21/2018    MCV 89 08/21/2018     08/21/2018    MCH 29 5 08/21/2018    MCHC 33 3 08/21/2018    RDW 14 1 08/21/2018    MPV 8 3 (L) 08/21/2018    NRBC 0 08/21/2018   , BMP/CMP:   Lab Results   Component Value Date     (L) 08/21/2018    K 3 7 08/21/2018    CL 94 (L) 08/21/2018    CO2 30 08/21/2018    ANIONGAP 6 08/21/2018    BUN 8 08/21/2018    CREATININE 0 58 (L) 08/21/2018    GLUCOSE 95 08/21/2018    CALCIUM 8 8 08/21/2018    AST 21 08/21/2018    ALT 30 08/21/2018    ALKPHOS 66 08/21/2018    PROT 6 6 08/21/2018    BILITOT 0 31 08/21/2018    EGFR 103 08/21/2018   , TSH: No results found for: TSH, Medication Drug Levels: No results found for: PHENYTOIN, VALPROICTOT, LAMOTRIGINE, LEVETIRACETA, TOPIRAMATE          Patient Strengths/Assets: average or above intelligence, capable of independent living, cooperative    Patient Barriers/Limitations: difficulty adapting, lack of social/family support, limited family ties, limited support system, poor interpersonal skills

## 2018-08-21 NOTE — PROGRESS NOTES
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

## 2018-08-21 NOTE — TREATMENT PLAN
TREATMENT PLAN REVIEW - 195 [facility] ANNAMARIA Plascencia 62 y o  1960 female MRN: [id number]    [address] [place] [address] [facility]: [facility] Encounter: [id number]        Admit Date/Time:  8/20/2018  7:20 PM    Treatment Team: Attending Provider: Neel Nunn MD; Patient Care Technician: Andreea Jain; : Sheree Ramirez; Occupational Therapy Assistant: Pati Bangura;  Registered Nurse: Angela Babin RN; Patient Care Technician: Savannah Grove    Diagnosis: Principal Problem:    Schizoaffective disorder, depressive type ([facility] [address] )  Active Problems:    ABIMAEL (generalized anxiety disorder)    Bulimia nervosa in remission    Patient Strengths/Assets: compliant with medication, patient is on a voluntary commitment, stable housing      Patient Barriers/Limitations: difficulty adapting, limited support system, poor reasoning ability    Short Term Goals: decrease in depressive symptoms, improvement in insight, sleep improvement    Long Term Goals: improvement in anxiety, resolution of depressive symptoms, free of suicidal thoughts    Progress Towards Goals: restarting psychiatric medications as prescribed    Recommended Treatment: medication management, patient medication education, group therapy, milieu therapy, continued Behavioral Health psychiatric evaluation/assessment process     Treatment Frequency: daily medication monitoring, group and milieu therapy daily, monitoring through interdisciplinary rounds, monitoring through weekly patient care conferences    Expected Discharge Date: 6 days - 8/27/2018    Discharge Plan: referral for outpatient medication management with a psychiatrist, referral for outpatient psychotherapy, return to previous living arrangement    Treatment Plan Created/Updated By: Edison Zurita MD

## 2018-08-21 NOTE — H&P
Psychiatric Evaluation - Behavioral Health     Identification Data:Irene Plascencia 62 y o  female MRN: [id number]  Unit/Bed#: SS1 768-02 Encounter: [id number]    Chief Complaint: depression and suicide attempt by overdose    History of Present Illness     Emre Angel is a 62 y o  female with a history of depression versus schizoaffective disorder who was admitted to the inpatient psychiatric unit on a voluntary 201 commitment basis due to depression and suicide attempt by overdose on multiple medications (Geodon and Paxil)    Symptoms prior to admission included worsening depression, suicidal ideation, suicide attempt, hopelessness, helplessness, poor concentration, poor appetite and difficulty sleeping  Onset of symptoms was gradual starting 1 year ago with progressively worsening course since that time  Stressors preceding admission included family conflict and mother's illness (dementia)  Kenisha Sandoval was brought in to ED by EMS after she attempted suicide by overdose on Geodon and Paxil (3 times her normal dose)  She was delirious on admission, had seizure activity and eventually had to be intubated and admitted to ICU  She was seen on Psychiatric Consultation Service and inpatient psychiatric admission was recommended once she was medically stabilized  On initial evaluation after admission to the inpatient psychiatric unit Kenisha Sandoval admitted to feeling depressed and anxious  She insisted that she did not remember events prior to admission and that she took pills "to feel better"  She was remorseful about overdose and was focusing on going back home to take care of her mother  She denied psychotic symptoms currently or in the past, but per old records had delusional thinking in the past  She also had odd, incongruent affect, somewhat pressured speech and limited insight into her illness      Psychiatric Review Of Systems:    Sleep changes: yes, decreased  Appetite changes: yes, decreased  Weight changes: yes, weight gain  Energy/anergy: yes, decreased  Interest/pleasure/anhedonia: yes, decreased  Somatic symptoms: yes  Anxiety/panic: yes  Karen: no  Guilty/hopeless: yes  Self injurious behavior/risky behavior: yes, history of cutting self  Suicidal ideation: yes, status post suicide attempt by overdose on medications  Homicidal ideation: no  Auditory hallucinations: no  Visual hallucinations: no  Other hallucinations: no  Delusional thinking: not at present, but somewhat odd affect  Eating disorder history: yes, past symptoms of bulimia years ago  Obsessive/compulsive symptoms: no    Historical Information     Past Psychiatric History:     Past Inpatient Psychiatric Treatment:   Multiple past inpatient psychiatric admissions at [address] [place] [address] and [facility]  Past Outpatient Psychiatric Treatment:    Currently in outpatient psychiatric treatment with a psychiatrist Dr Melton  at [facility]    Past Suicide Attempts: yes, by cutting wrists  Past Violent Behavior: no  Past Psychiatric Medication Trials: Paxil, Tofranil, Risperdal, Seroquel, Zyprexa, Geodon and Ativan     Substance Abuse History:    Social History     Tobacco History     Smoking Status  Never Smoker    Smokeless Tobacco Use  Never Used          Alcohol History     Alcohol Use Status  No          Drug Use     Drug Use Status  No          Sexual Activity     Sexually Active  Not Asked          Activities of Daily Living    Not Asked                 I have assessed this patient for substance use within the past 12 months    Alcohol use: denies use  Recreational drug use:   Cocaine:  denies use  Heroin:  denies use  Marijuana:  denies use  Other drugs: denies use   Longest clean time: not applicable  History of Inpatient/Outpatient rehabilitation program: no  Smoking history: denies use  Use of caffeine: 3 caffeinated soft drinks per day    Family Psychiatric History:     Psychiatric Illness: Mother - depression  Substance Abuse:  no family history of substance abuse  Suicide Attempts:  no family history of suicide attempts    Social History:    Education: PhD in statistical analysis of consumer behavior  Learning Disabilities: none  Marital History: single  Children: none  Living Arrangement: lives in home with mother  Occupational History: works part time as a teacher at [employer], on permanent disability  Functioning Relationships: limited support system  Legal History: none   History: None    Traumatic History:     Abuse: none  Other Traumatic Events: none     Past Medical History:    History of Seizures: yes  History of Head injury with loss of consciousness: no    Past Medical History:   Diagnosis Date    Anxiety     Back pain at L4-L5 level     Schreiber esophagus     Bulimia     Disease of thyroid gland     hypothyroid    GERD (gastroesophageal reflux disease)     Hyperlipidemia     Hypothyroidism     Leukopenia     Sciatica     Seizure ([address] )     Synovial cyst of lumbar spine     Vertigo     Weight gain      Past Surgical History:   Procedure Laterality Date    BONE MARROW BIOPSY      BREAST SURGERY      enlargement     RHINOPLASTY         Medical Review Of Systems:    A comprehensive review of systems was negative except for: Musculoskeletal: positive for back pain  Behavioral/Psych: positive for anxiety, depression and suicidal ideation    Allergies: Allergies   Allergen Reactions    Latex     Risperdal [Risperidone]     Zyprexa [Olanzapine]        Medications: All current active medications have been reviewed  Medications prior to admission:    Prior to Admission Medications   Prescriptions Last Dose Informant Patient Reported? Taking? LORazepam (ATIVAN) 2 mg tablet  Self Yes No   Sig: Take 2 mg by mouth every 6 (six) hours as needed for anxiety     LORazepam (ATIVAN) 2 mg tablet  Self Yes No   Sig: Take 1 tablet by mouth 4 (four) times a day   Methylprednisolone 4 MG TBPK  Self No No   Simg PO on day 1, then decrease by 4mg/day x 5 days per dose pack instructions  PARoxetine (PAXIL) 30 mg tablet  Self Yes No   Sig: Take 30 mg by mouth every morning  PARoxetine (PAXIL) 30 mg tablet  Self Yes No   Sig: Take 1 tablet by mouth 3 (three) times a day   QUEtiapine (SEROQUEL) 200 mg tablet  Self Yes No   Sig: Take 2 tablets by mouth daily at bedtime   QUEtiapine (SEROquel) 200 mg tablet  Self Yes No   Sig: Take 200 mg by mouth 2 (two) times a day  QUEtiapine (SEROquel) 400 MG tablet  Self Yes No   Sig: Take 400 mg by mouth daily at bedtime   alendronate (FOSAMAX) 70 mg tablet   No No   Sig: Take 1 tablet (70 mg total) by mouth every 7 days for 90 days   bisacodyl (DULCOLAX) 5 mg EC tablet  Self Yes No   Sig: Take by mouth   calcium carbonate-vitamin D (OSCAL-D) 500 mg-200 units per tablet   No No   Sig: Take 1 tablet by mouth 2 (two) times a day with meals for 30 days   diclofenac potassium (CATAFLAM) 50 mg tablet   No No   Sig: TAKE 1 TABLET BY MOUTH TWICE A DAY AS NEEDED   docusate sodium (COLACE) 100 mg capsule  Self Yes No   Sig: Take 1 capsule by mouth 2 (two) times a day   ferrous sulfate 325 (65 Fe) mg tablet   No No   Sig: Take 1 tablet (325 mg total) by mouth 2 (two) times a day for 90 days   fluticasone (FLONASE) 50 mcg/act nasal spray   No No   Si spray into each nostril daily for 14 days   levothyroxine 25 mcg tablet  Self Yes No   Sig: Take 25 mcg by mouth daily  levothyroxine 25 mcg tablet  Self Yes No   Sig: Take 1 tablet by mouth daily   levothyroxine 25 mcg tablet   No No   Sig: TAKE 1 TABLET EVERY DAY   omeprazole (PriLOSEC) 40 MG capsule  Self Yes No   Sig: Take 40 mg by mouth 2 (two) times a day  omeprazole (PriLOSEC) 40 MG capsule  Self Yes No   Sig: Take 80 mg by mouth daily at bedtime     omeprazole (PriLOSEC) 40 MG capsule  Self Yes No   Sig: Take 1 capsule by mouth 2 (two) times a day   omeprazole (PriLOSEC) 40 MG capsule   No No   Sig: TAKE 1 CAPSULE TWICE DAILY  triamcinolone (KENALOG) 0 1 % cream  Self Yes No   ziprasidone (GEODON) 80 mg capsule  Self Yes No   Sig: Take 80 mg by mouth daily     ziprasidone (GEODON) 80 mg capsule  Self Yes No   Sig: Take 2 capsules by mouth daily at bedtime        Facility-Administered Medications: None       OBJECTIVE:    Vital signs in last 24 hours:    Temp:  [97 7 °F (36 5 °C)-99 8 °F (37 7 °C)] 98 2 °F (36 8 °C)  HR:  [] 120  Resp:  [16-18] 16  BP: (133-144)/(80-90) 133/87    No intake or output data in the 24 hours ending 08/21/18 0931     Mental Status Evaluation:    Appearance:  disheveled, looks older than stated age   Behavior:  cooperative, restless and fidgety   Speech:  tangential, slightly pressured   Mood:  depressed, dysphoric, anxious   Affect:  mood-incongruent, odd   Language: naming objects and repeating phrases   Thought Process:  tangential, concrete   Associations: tangential associations   Thought Content:  no overt delusions   Perceptual Disturbances: no auditory hallucinations, no visual hallucinations   Risk Potential: Suicidal ideation - Yes, status post suicide attempt by overdose, remorseful now  Homicidal ideation - None  Potential for aggression - No   Sensorium:  oriented to person, place and time/date   Memory:  recent and remote memory grossly intact   Consciousness:  alert and awake   Attention: decreased concentration and decreased attention span   Intellect: above average   Fund of Knowledge: awareness of current events: yes  past history: yes  vocabulary: normal   Insight:  impaired   Judgment: impaired   Muscle Strength Muscle Tone: normal  normal   Gait/Station: normal gait/station and normal balance   Motor Activity: no abnormal movements       Laboratory Results: I have personally reviewed all pertinent laboratory/tests results      Admission Labs:   Admission on 08/20/2018   Component Date Value    Cholesterol 08/21/2018 205*    Triglycerides 08/21/2018 86     HDL, Direct 08/21/2018 38*    LDL Calculated 08/21/2018 150*    Non-HDL-Chol (CHOL-HDL) 08/21/2018 167     Sodium 08/21/2018 130*    Potassium 08/21/2018 3 7     Chloride 08/21/2018 94*    CO2 08/21/2018 30     Anion Gap 08/21/2018 6     BUN 08/21/2018 8     Creatinine 08/21/2018 0 58*    Glucose 08/21/2018 95     Glucose, Fasting 08/21/2018 95     Calcium 08/21/2018 8 8     AST 08/21/2018 21     ALT 08/21/2018 30     Alkaline Phosphatase 08/21/2018 66     Total Protein 08/21/2018 6 6     Albumin 08/21/2018 3 2*    Total Bilirubin 08/21/2018 0 31     eGFR 08/21/2018 103     WBC 08/21/2018 3 85*    RBC 08/21/2018 3 83     Hemoglobin 08/21/2018 11 3*    Hematocrit 08/21/2018 33 9*    MCV 08/21/2018 89     MCH 08/21/2018 29 5     MCHC 08/21/2018 33 3     RDW 08/21/2018 14 1     MPV 08/21/2018 8 3*    Platelets 28/62/5685 324     nRBC 08/21/2018 0     Neutrophils Relative 08/21/2018 65     Immat GRANS % 08/21/2018 1     Lymphocytes Relative 08/21/2018 18     Monocytes Relative 08/21/2018 14*    Eosinophils Relative 08/21/2018 1     Basophils Relative 08/21/2018 1     Neutrophils Absolute 08/21/2018 2 49     Immature Grans Absolute 08/21/2018 0 05     Lymphocytes Absolute 08/21/2018 0 69     Monocytes Absolute 08/21/2018 0 55     Eosinophils Absolute 08/21/2018 0 05     Basophils Absolute 08/21/2018 0 02     T3, Free 08/21/2018 2 47     Free T4 08/21/2018 1 01     TSH 3RD GENERATON 08/21/2018 2 720    Drug Screen:   Lab Results   Component Value Date    AMPMETHUR Negative 08/13/2018    BARBTUR Negative 08/13/2018    BDZUR Negative 08/13/2018    THCUR Negative 08/13/2018    COCAINEUR Negative 08/13/2018    METHADONEUR Negative 08/13/2018    OPIATEUR Negative 08/13/2018    PCPUR Negative 08/13/2018       Imaging Studies:     Xr Chest Portable; Result Date: 8/14/2018; Narrative: CHEST INDICATION:   intubation   COMPARISON:  1 day prior Impression: Minimal subsegmental atelectasis right perihilar region  Xr Chest Portable; Result Date: 8/13/2018; Narrative: CHEST INDICATION:   tachycardia, AMS  COMPARISON:  9/24/2016   Impression: Lines and tubes as above  Right perihilar and left retrocardiac atelectasis  Ct Head Without Contrast; Result Date: 8/13/2018; Narrative: CT BRAIN - WITHOUT CONTRAST INDICATION:   Altered mental status  COMPARISON:  5/13/2016  Impression: No acute intracranial abnormality  Code Status: Level 1 - Full Code  Advance Directive and Living Will: <no information>    Assessment/Plan   Principal Problem:    Schizoaffective disorder, depressive type ([facility] [address] )  Active Problems:    ABIMAEL (generalized anxiety disorder)    Bulimia nervosa in remission    Patient Strengths: compliant with medication, patient is on a voluntary commitment, stable housing     Patient Barriers: difficulty adapting, limited support system, poor reasoning ability    Treatment Plan:     Planned Treatment and Medication Changes: All current active medications have been reviewed    Encourage group therapy, milieu therapy and occupational therapy  Behavioral Health checks every 5 minutes  Restart Seroquel 100 mg at bedtime and titrate dose gradually  Restart Paxil 10 mg daily and titrate dose    Current medications:    Current Facility-Administered Medications:  acetaminophen 650 mg Oral Q6H PRN Cornelius Davila MD   acetaminophen 975 mg Oral Q6H PRN Jasvir Concepcion MD   benztropine 1 mg Intramuscular Q6H PRN Jasvir Concepcion MD   benztropine 1 mg Oral Q6H PRN Jasvir Concepcion MD   calcium carbonate 500 mg Oral Daily PRN Ila Schofield MD   docusate sodium 100 mg Oral BID Ila Schofield MD   ferrous sulfate 325 mg Oral BID Ila Schofield MD   haloperidol 5 mg Oral Q6H PRN Jasvir Concepcion MD   haloperidol lactate 5 mg Intramuscular Q6H PRN Jasvir Concepcion MD   ibuprofen 600 mg Oral Q8H PRN Cornelius Davila MD   lactulose 20 g Oral BID PRN Ila Schofield MD   levothyroxine 25 mcg Oral Early Morning Tony Lopez MD   LORazepam 1 mg Intramuscular Q6H PRN Denisse Wang MD   LORazepam 1 mg Oral Q8H PRN Denisse Wang MD   magnesium hydroxide 30 mL Oral Daily PRN Laureano Daniels MD   pantoprazole 40 mg Oral HS Denisse Wang MD   PARoxetine 10 mg Oral Daily Tony Lopez MD   QUEtiapine 100 mg Oral HS Denisse Wang MD   senna-docusate sodium 1 tablet Oral HS Tony Lopez MD   traZODone 50 mg Oral HS PRN Laureano Daniels MD       Risks / Benefits of Treatment:    Risks, benefits, and possible side effects of medications explained to patient including risk of parkinsonian symptoms, Tardive Dyskinesia and metabolic syndrome related to treatment with antipsychotic medications and risk of suicidality related to treatment with antidepressants  The patient verbalizes understanding and agreement for treatment  Counseling / Coordination of Care:    Patient's presentation on admission and proposed treatment plan discussed with treatment team   Diagnosis, medication changes and treatment plan reviewed with patient  Stressors preceding admission discussed with patient including mother's illness  Events leading to admission reviewed with patient  Inpatient Psychiatric Certification:    Estimated length of stay: 6 midnights    Based upon physical, mental and social evaluations, I certify that inpatient psychiatric services are medically necessary for this patient for a duration of 6 midnights for the treatment of Schizoaffective disorder, depressive type [facility])  Available alternative community resources do not meet the patient's mental health care needs  I further attest that an established written individualized plan of care has been implemented and is outlined in the patient's medical records      Tasha Kirkland MD 08/21/18

## 2018-08-22 ENCOUNTER — TRANSITIONAL CARE MANAGEMENT (OUTPATIENT)
Dept: FAMILY MEDICINE CLINIC | Facility: CLINIC | Age: 58
End: 2018-08-22

## 2018-08-22 PROCEDURE — 99232 SBSQ HOSP IP/OBS MODERATE 35: CPT | Performed by: PSYCHIATRY & NEUROLOGY

## 2018-08-22 RX ADMIN — PAROXETINE HYDROCHLORIDE 10 MG: 10 TABLET, FILM COATED ORAL at 08:41

## 2018-08-22 RX ADMIN — SENNOSIDES AND DOCUSATE SODIUM 1 TABLET: 8.6; 5 TABLET ORAL at 21:25

## 2018-08-22 RX ADMIN — DOCUSATE SODIUM 100 MG: 100 CAPSULE, LIQUID FILLED ORAL at 17:33

## 2018-08-22 RX ADMIN — PANTOPRAZOLE SODIUM 40 MG: 40 TABLET, DELAYED RELEASE ORAL at 21:25

## 2018-08-22 RX ADMIN — LEVOTHYROXINE SODIUM 25 MCG: 25 TABLET ORAL at 06:38

## 2018-08-22 RX ADMIN — Medication 325 MG: at 08:41

## 2018-08-22 RX ADMIN — Medication 325 MG: at 17:33

## 2018-08-22 RX ADMIN — QUETIAPINE 150 MG: 25 TABLET ORAL at 21:25

## 2018-08-22 RX ADMIN — DOCUSATE SODIUM 100 MG: 100 CAPSULE, LIQUID FILLED ORAL at 08:41

## 2018-08-22 NOTE — PLAN OF CARE
DEPRESSION     Will be euthymic at discharge Progressing        SELF HARM/SUICIDALITY     Will have no self-injury during hospital stay Progressing

## 2018-08-22 NOTE — PROGRESS NOTES
Patient denies symptoms  Patient's affects is somewhat bizzare  Patient's speech rambling, loud, pressured and repetitive  Patient is calm in cooperative  Active in milieu

## 2018-08-22 NOTE — PROGRESS NOTES
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

## 2018-08-22 NOTE — PROGRESS NOTES
Progress Note - Behavioral Health     Nate Juárez 62 y o  female MRN: [id number]   Unit/Bed#: QL3 768-02 Encounter: [id number]    Behavior over the last 24 hours: alexi Miles is pleasant upon approach, she is slightly disheveled, appears older than stated age, she is slightly restless  Her speech is slightly pressured and tangential, her affect is mood incongruent, she has an inappropriate smile and laugh  Her thought process is tangential and slightly disorganized jumping from subject to subject  Yaneli Miles told the interviewer the same information 5 times throughout the interview "I don't know that 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 but I am going to 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 until my medications are adjusted "  Due to this the interviewer completed a Mini-Mental and the patient scored a 22 out of 30 (scanned into media)  A neuro-psych evaluation was ordered  Patient reports her depression at a 5 out of 10 (10 worst) and anxiety 3 out of 10 (10 worst)  Patient reports that she believes "I may have taken ativan along with the Geodon and Paxil "  I reviewed the patient's medications that are currently prescribed and she verbalized understanding but she remained very odd throughout interview as if she didn't recall overdosing       Sleep: normal  Appetite: normal  Medication side effects: No   ROS: no complaints, denies any headache, shortness of breath, chest pain or abdominal pain    Mental Status Evaluation:    Appearance:  wearing hospital clothes, disheveled, looks older than stated age   Behavior:  cooperative, restless   Speech:  tangential, mildly disorganized , slightly pressured   Mood:  depressed, anxious   Affect:  inappropriate laugh, inappropriate smile, mood-incongruent   Thought Process:  tangential   Associations: tangential associations   Thought Content:  no overt delusions Perceptual Disturbances: no auditory hallucinations, no visual hallucinations, does not appear responding to internal stimuli   Risk Potential: Suicidal ideation - None at present  Homicidal ideation - None  Potential for aggression - No   Sensorium:  oriented to person and place   Memory:  short term memory moderately impaired, long term memory intact   Consciousness:  alert and awake   Attention: poor concentration and poor attention span   Insight:  impaired   Judgment: impaired   Gait/Station: normal gait/station and normal balance   Motor Activity: no abnormal movements     Vital signs in last 24 hours:    Temp:  [97 8 °F (36 6 °C)-98 °F (36 7 °C)] 97 8 °F (36 6 °C)  HR:  [104] 104  Resp:  [16] 16  BP: (104-137)/(63-83) 104/63    Laboratory results:    I have personally reviewed all pertinent laboratory/tests results  Most Recent Labs:   Lab Results   Component Value Date    WBC 3 85 (L) 08/21/2018    RBC 3 83 08/21/2018    HGB 11 3 (L) 08/21/2018    HCT 33 9 (L) 08/21/2018     08/21/2018    RDW 14 1 08/21/2018    NEUTROABS 2 49 08/21/2018     (L) 08/21/2018    K 3 7 08/21/2018    CL 94 (L) 08/21/2018    CO2 30 08/21/2018    BUN 8 08/21/2018    CREATININE 0 58 (L) 08/21/2018    GLUCOSE 95 08/21/2018    CALCIUM 8 8 08/21/2018    AST 21 08/21/2018    ALT 30 08/21/2018    ALKPHOS 66 08/21/2018    PROT 6 6 08/21/2018    BILITOT 0 31 08/21/2018    HDL 38 (L) 08/21/2018    TRIG 86 08/21/2018    LDLCALC 150 (H) 08/21/2018    LOV6GRBOSCXO 2 720 08/21/2018    FREET4 1 01 08/21/2018    T3FREE 2 47 08/21/2018       Progress Toward Goals: no significant improvement, Mini-mental 22 out of 30    Assessment/Plan   Principal Problem:    Schizoaffective disorder, depressive type ([facility] [address] )  Active Problems:    Bulimia nervosa in remission    ABIMAEL (generalized anxiety disorder)    Recommended Treatment:     Planned medication and treatment changes:    -All current active medications have been reviewed    -Encourage group therapy, milieu therapy and occupational therapy  -[address] checks every 5 minutes  -Consult Neuropsych for cognitive moderate deficit (Mini-mental score 22/30)  -Increase Seroquel from 100 mg po Q HS to 150 mg po Q HS        Current Facility-Administered Medications:  acetaminophen 650 mg Oral Q6H RICARDA Davila MD   acetaminophen 975 mg Oral Q6H PRN Jasvir Concepcion MD   benztropine 1 mg Intramuscular Q6H PRN Jasvir Concepcion MD   benztropine 1 mg Oral Q6H PRN Jasvir Concepcion, MD   calcium carbonate 500 mg Oral Daily PRN Ila Schofield MD   docusate sodium 100 mg Oral BID Ila Schofield MD   ferrous sulfate 325 mg Oral BID Ila Schofield MD   haloperidol 5 mg Oral Q6H PRN Jasvir Concepcion MD   haloperidol lactate 5 mg Intramuscular Q6H PRN Jasvir Concepcion MD   ibuprofen 600 mg Oral Q8H PRLIZBETH Davila MD   lactulose 20 g Oral BID PRN Ila Schofield MD   levothyroxine 25 mcg Oral Early Morning Ila Schofield MD   LORazepam 1 mg Intramuscular Q6H PRN Jasvir Concepcion MD   LORazepam 1 mg Oral Q8H PRN Jasvir Concepcion MD   magnesium hydroxide 30 mL Oral Daily RICARDA Davila MD   pantoprazole 40 mg Oral HS Jasvir Concepcion MD   PARoxetine 10 mg Oral Daily Ila Schofield MD   QUEtiapine 150 mg Oral HS [place] ABENA [place]   senna-docusate sodium 1 tablet Oral HS Ila Schofield MD   traZODone 50 mg Oral HS PRLIZBETH Davila MD       Risks / Benefits of Treatment:    Risks, benefits, and possible side effects of medications explained to patient and patient verbalizes understanding and agreement for treatment  Counseling / Coordination of Care:    Patient's progress discussed with staff in treatment team meeting  Medications, treatment progress and treatment plan reviewed with patient  Supportive counseling provided to the patient

## 2018-08-22 NOTE — TELEPHONE ENCOUNTER
Left vm stating information on Tammys vm  I did tell her if she needed to discuss furhter to call back and we would retrieve this message

## 2018-08-22 NOTE — MEDICAL STUDENT
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  She is planning to speak to a  to ask for help for taking care of her mom    Behavior over the last 24 hours: Slight improvement, continues to perseverate, have slightly pressured speech, and mood-incongruency  Sleep: improved, a full night's sleep after taking Seroquel   Appetite: normal  Medication side effects: No  ROS: back pain     Mental Status Evaluation:  Appearance:  overweight and younger than stated age   Behavior:  odd behavior   Speech:  normal pitch, normal volume and slightly pressured   Mood:  anxious   Affect:  mood-incongruent   Thought Process:  perserverative   Thought Content:  obsessions and perserverative about mother and teaching job   Perceptual Disturbances: None   Risk Potential: reports no present SI or HI, but at risk   Sensorium:  person, place and time/date   Cognition:  fair   Consciousness:  alert    Attention: attention span and concentration were age appropriate   Insight:  limited   Judgment: limited   Gait/Station: limp   Motor Activity: none besides limp         Recommended Treatment: Continue with group therapy, milieu therapy and occupational therapy            Medications:   current meds:   Current Facility-Administered Medications   Medication Dose Route Frequency    acetaminophen (TYLENOL) tablet 650 mg  650 mg Oral Q6H PRN    acetaminophen (TYLENOL) tablet 975 mg  975 mg Oral Q6H PRN    benztropine (COGENTIN) injection 1 mg  1 mg Intramuscular Q6H PRN    benztropine (COGENTIN) tablet 1 mg  1 mg Oral Q6H PRN    calcium carbonate (TUMS) chewable tablet 500 mg  500 mg Oral Daily PRN    docusate sodium (COLACE) capsule 100 mg  100 mg Oral BID    ferrous sulfate tablet 325 mg  325 mg Oral BID    haloperidol (HALDOL) tablet 5 mg  5 mg Oral Q6H PRN    haloperidol lactate (HALDOL) injection 5 mg  5 mg Intramuscular Q6H PRN    ibuprofen (MOTRIN) tablet 600 mg  600 mg Oral Q8H PRN    lactulose 20 g/30 mL oral solution 20 g  20 g Oral BID PRN    levothyroxine tablet 25 mcg  25 mcg Oral Early Morning    LORazepam (ATIVAN) 2 mg/mL injection 1 mg  1 mg Intramuscular Q6H PRN    LORazepam (ATIVAN) tablet 1 mg  1 mg Oral Q8H PRN    magnesium hydroxide (MILK OF MAGNESIA) 400 mg/5 mL oral suspension 30 mL  30 mL Oral Daily PRN    pantoprazole (PROTONIX) EC tablet 40 mg  40 mg Oral HS    PARoxetine (PAXIL) tablet 10 mg  10 mg Oral Daily    QUEtiapine (SEROquel) tablet 150 mg  150 mg Oral HS    senna-docusate sodium (SENOKOT S) 8 6-50 mg per tablet 1 tablet  1 tablet Oral HS    traZODone (DESYREL) tablet 50 mg  50 mg Oral HS PRN

## 2018-08-23 PROCEDURE — 99232 SBSQ HOSP IP/OBS MODERATE 35: CPT | Performed by: PSYCHIATRY & NEUROLOGY

## 2018-08-23 RX ORDER — HYDROXYZINE HYDROCHLORIDE 25 MG/1
25 TABLET, FILM COATED ORAL EVERY 6 HOURS PRN
Status: DISCONTINUED | OUTPATIENT
Start: 2018-08-23 | End: 2018-08-29 | Stop reason: HOSPADM

## 2018-08-23 RX ADMIN — PANTOPRAZOLE SODIUM 40 MG: 40 TABLET, DELAYED RELEASE ORAL at 21:31

## 2018-08-23 RX ADMIN — PAROXETINE HYDROCHLORIDE 10 MG: 10 TABLET, FILM COATED ORAL at 08:05

## 2018-08-23 RX ADMIN — QUETIAPINE 250 MG: 25 TABLET ORAL at 21:31

## 2018-08-23 RX ADMIN — Medication 325 MG: at 08:05

## 2018-08-23 RX ADMIN — DOCUSATE SODIUM 100 MG: 100 CAPSULE, LIQUID FILLED ORAL at 17:00

## 2018-08-23 RX ADMIN — LEVOTHYROXINE SODIUM 25 MCG: 25 TABLET ORAL at 06:35

## 2018-08-23 RX ADMIN — DOCUSATE SODIUM 100 MG: 100 CAPSULE, LIQUID FILLED ORAL at 08:06

## 2018-08-23 RX ADMIN — Medication 325 MG: at 17:00

## 2018-08-23 RX ADMIN — SENNOSIDES AND DOCUSATE SODIUM 1 TABLET: 8.6; 5 TABLET ORAL at 21:40

## 2018-08-23 NOTE — PROGRESS NOTES
Pt denies all symptoms  She is friendly, bright, with pressured speech  She repeatedly expresses concern for her mother, stating she wants to get back home and resume her role as her mother's caregiver  Pt is appropriate and pleasant on the unit

## 2018-08-23 NOTE — PROGRESS NOTES
Progress Note - Behavioral Health     Carissa Bauer 62 y o  female MRN: [id number]   Unit/Bed#: MP5 768-02 Encounter: [id number]    Behavior over the last 24 hours: alexi Owens reports that she had a visit with her mother this morning and her mother was visited by [facility] and they did cognitive testing on her  She states, "this is a burden lifted from my shoulders but I am not sure if they are going to be able to come here to give me the results "     Bessy Owens denies suicidal and homicidal ideation at this time she denies auditory, visual and all other hallucinations  Bessy Owens had a very difficult time remembering dosages of medications that she reports taking for many years which were prescribed by Dr Sonia Daley  Called [facility] on [address] to confirm dosages and medications please see below  Patient is attending groups per morning report, she is tolerating her diet without issue, as she is taking her medications without issue no physical complaints  Patient continues to repeat herself multiple times throughout the interview using the same information,  neuropsych testing pending        Sleep: normal  Appetite: normal  Medication side effects: No   ROS: no complaints, denies any headache, shortness of breath, chest pain, abdominal pain, constipation, diarrhea, lightheadedness, muscle cramps, nausea, tremor or vomiting    Mental Status Evaluation:    Appearance:  wearing hospital clothes, slighlty disheveled   Behavior:  pleasant, cooperative, restless and fidgety, odd   Speech:  increased rate, pressured, tangential, mildly disorganized    Mood:  slightly less anxious, slightly less depressed   Affect:  mood-incongruent, odd    Thought Process:  tangential, repeats same thoughts multiple times throughout conversation   Associations: tangential associations   Thought Content:  no overt delusions   Perceptual Disturbances: no auditory hallucinations, no visual hallucinations, does not appear responding to internal stimuli   Risk Potential: Suicidal ideation - None  Homicidal ideation - None  Potential for aggression - No   Sensorium:  oriented to person and place   Memory:  short term memory impaired, long term memory intact   Consciousness:  alert and awake   Attention: poor concentration and poor attention span   Insight:  impaired   Judgment: impaired   Gait/Station: normal gait/station and normal balance   Motor Activity: no abnormal movements     Vital signs in last 24 hours:    Temp:  [97 9 °F (36 6 °C)] 97 9 °F (36 6 °C)  HR:  [] 105  Resp:  [16] 16  BP: (113-123)/(70-76) 113/76    Laboratory results:  No new labs to review today    Progress Toward Goals: no significant improvement , patient continues to repeat herself multiple times, appears to be very forgetful  Assessment/Plan   Principal Problem:    Schizoaffective disorder, depressive type (HCC)  Active Problems:    Bulimia nervosa in remission    ABIMAEL (generalized anxiety disorder)    Recommended Treatment:     Planned medication and treatment changes: All current active medications have been reviewed  Encourage group therapy, milieu therapy and occupational therapy  Behavioral Health checks every 5 minutes  Consult pending for Neuropsych testing     Increase Seroquel from 150 mg po Q HS to 250 mg po Q HS (will titrate to 400 mg po Q HS this is dose patient was on prior to hospitalization)    Called [facility] on [address] to confirm psychiatric medications due to patient confusion about doses they are as follows prior to hospitalization:  -Seroquel 400 mg PO Q HS  -Paxil 40 mg p o  T i d   -Lorazepam 2 mg p o  Q 8 hours for anxiety (Do not plan to order -denies anxiety PRN atarax/ativan available)  -Geodon 80 mg p o  T i d     Current Facility-Administered Medications:  acetaminophen 650 mg Oral Q6H PRN José Miguel Marte MD   acetaminophen 975 mg Oral Q6H PRN Cooper Barrios MD   benztropine 1 mg Intramuscular Q6H PRN Cooper Barrios MD   benztropine 1 mg Oral Q6H PRN Cooper Barrios MD   calcium carbonate 500 mg Oral Daily PRN Yanira Low MD   docusate sodium 100 mg Oral BID Yanira Low MD   ferrous sulfate 325 mg Oral BID Yanira Low MD   haloperidol 5 mg Oral Q6H PRN Cooper Barrios MD   haloperidol lactate 5 mg Intramuscular Q6H PRN Cooper Barrios MD   ibuprofen 600 mg Oral Q8H PRN Radha Gaytan MD   lactulose 20 g Oral BID PRN Yanira Low MD   levothyroxine 25 mcg Oral Early Morning Yanira Low MD   LORazepam 1 mg Intramuscular Q6H PRN Cooper Barrios MD   LORazepam 1 mg Oral Q8H PRN Cooper Barrios MD   magnesium hydroxide 30 mL Oral Daily PRN Radha Gaytan MD   pantoprazole 40 mg Oral HS Cooper Barrios MD   PARoxetine 10 mg Oral Daily Yanira Low MD   QUEtiapine 150 mg Oral HS ABENA Sevilla   senna-docusate sodium 1 tablet Oral HS Yanira Low MD   traZODone 50 mg Oral HS PRN Radha Gaytan MD       Risks / Benefits of Treatment:    Risks, benefits, and possible side effects of medications explained to patient and patient verbalizes understanding and agreement for treatment  Counseling / Coordination of Care:    Patient's progress discussed with staff in treatment team meeting  Medications, treatment progress and treatment plan reviewed with patient  Supportive counseling provided to the patient

## 2018-08-23 NOTE — PLAN OF CARE
Problem: SELF HARM/SUICIDALITY  Goal: Will have no self-injury during hospital stay  INTERVENTIONS:  - Q 15 MINUTES: Routine safety checks  - Q WAKING SHIFT & PRN: Assess risk to determine if routine checks are adequate to maintain patient safety  - Encourage patient to participate actively in care by formulating a plan to combat response to suicidal ideation, identify supports and resources   Outcome: Progressing      Problem: DEPRESSION  Goal: Will be euthymic at discharge  INTERVENTIONS:  - Administer medication as ordered  - Provide emotional support via 1:1 interaction with staff  - Encourage involvement in milieu/groups/activities  - Monitor for social isolation   Outcome: Progressing      Problem: DISCHARGE PLANNING  Goal: Discharge to home or other facility with appropriate resources  INTERVENTIONS:  - Identify barriers to discharge w/patient and caregiver  - Arrange for needed discharge resources and transportation as appropriate  - Identify discharge learning needs (meds, wound care, etc )  - Refer to Case Management Department for coordinating discharge planning if the patient needs post-hospital services based on physician/advanced practitioner order or complex needs related to functional status, cognitive ability, or social support system   Outcome: Progressing      Problem: Ineffective Coping  Goal: Participates in unit activities  Interventions:  - Provide therapeutic environment   - Provide required programming   - Redirect inappropriate behaviors    Outcome: Progressing

## 2018-08-23 NOTE — PROGRESS NOTES
Patient stated during bed time medication pass that her mother got a psychiatric evaluation for inpatient services  I reminded her that she told me this already when we had our earlier conversation while she was eating dinner  Patient did not recall that she had already told me that

## 2018-08-23 NOTE — PROGRESS NOTES
Pt denies all sxs's, she is calm, pleasant and presents with a bright affect  She shared how she was feeling overwhelmed and wanted to feel better and had an extra Geodon which caused her seizures and ended up in the hospital She states she wants to care fore her mother as she cared for her and we discussed ways tor reach for help when needed or wanted a break  She states she has a supportive boyfriend and a neighbor who brought her mother to visit her today and is caring for her while in the hospital  Pt said she was told she needed to find a therapist she will visit Dr Lang  Pt would like to over her d/c plan with staff

## 2018-08-23 NOTE — CASE MANAGEMENT
Met with pt  Signed tx plan & ROIs for mother, Pretty Goldman [phone]; Dr Carlitos Vitale @ [address]; PCP, [address] [place] [address]; & Edison Son, Adult Protective Services  Pt denied SI  Pt sees Dr Carlitos Vitale @ [address]  Said she had seen a therapist X 30 yrs, who moved away  Pt said she'd like to see another therapist  Nadia Her advised pt to speak to her dr to request a therapist  Pt lives with her 80 yr old mother  Said her mother had beginning stages of dementia  Has a neighbor that helps them out  Brought mother to visit pt this AM  SW asked pt about events prior to admission after she took an OD of meds  Pt said her mother called her a derogatory name & cursed at her  This upset pt tremendously  Pt discussed this with her mother, who said she did not remember saying that to the pt  Pt now feels better knowing that there was a reason for her mother's statement  PT said she had a PhD, & was a PT professor @ [employer]  Said she took this semester off  Was to teach [employer]  Hopes to retrun to teach next semester  Pt denied SI  Talked about past employment jobs  SW spoke to pt re: alternative coping strategies when feeling upset, etc, rather than taking OD  Pt had odd affect, in that she often repeated many things, explanations, etc  Did not seem to realize that she already said that  Stared when she was speaking, hardly blinking  Cooperative  Calm  SW called [phone]  Left message for Raymon Rya, re: pt's admission

## 2018-08-24 ENCOUNTER — TELEPHONE (OUTPATIENT)
Dept: RADIOLOGY | Facility: CLINIC | Age: 58
End: 2018-08-24

## 2018-08-24 PROCEDURE — 99232 SBSQ HOSP IP/OBS MODERATE 35: CPT | Performed by: PSYCHIATRY & NEUROLOGY

## 2018-08-24 RX ORDER — B-COMPLEX WITH VITAMIN C
1 TABLET ORAL 2 TIMES DAILY WITH MEALS
Qty: 60 TABLET | Refills: 0 | Status: SHIPPED | OUTPATIENT
Start: 2018-08-24 | End: 2018-10-01 | Stop reason: SDUPTHER

## 2018-08-24 RX ADMIN — PAROXETINE HYDROCHLORIDE 10 MG: 10 TABLET, FILM COATED ORAL at 08:05

## 2018-08-24 RX ADMIN — SENNOSIDES AND DOCUSATE SODIUM 1 TABLET: 8.6; 5 TABLET ORAL at 21:39

## 2018-08-24 RX ADMIN — DOCUSATE SODIUM 100 MG: 100 CAPSULE, LIQUID FILLED ORAL at 17:41

## 2018-08-24 RX ADMIN — PANTOPRAZOLE SODIUM 40 MG: 40 TABLET, DELAYED RELEASE ORAL at 21:39

## 2018-08-24 RX ADMIN — DOCUSATE SODIUM 100 MG: 100 CAPSULE, LIQUID FILLED ORAL at 08:05

## 2018-08-24 RX ADMIN — LEVOTHYROXINE SODIUM 25 MCG: 25 TABLET ORAL at 06:12

## 2018-08-24 RX ADMIN — QUETIAPINE FUMARATE 350 MG: 300 TABLET ORAL at 21:39

## 2018-08-24 RX ADMIN — Medication 325 MG: at 08:05

## 2018-08-24 RX ADMIN — Medication 325 MG: at 17:41

## 2018-08-24 NOTE — PROGRESS NOTES
Pt denies all symptoms  She is bright, talkative, pleasant  Cooperative with medications  Pt is looking forward to d/c next week sometime  She talked about being happy her mother visited yesterday and wanting to go home  Pt does seem to have some memory deficits and often repeats herself or information she has given to RN

## 2018-08-24 NOTE — PROGRESS NOTES
Patient out on unit, social, bright, and pleasant  Patient denies all symptoms and is eager to return home to help care for her mother

## 2018-08-24 NOTE — TELEPHONE ENCOUNTER
Pts mother lmovm needs to r/s 8/28/18 procedure  I left message stating procedure was cxl for 8/28/18 and to r/c to r/s

## 2018-08-25 PROCEDURE — 99231 SBSQ HOSP IP/OBS SF/LOW 25: CPT | Performed by: PSYCHIATRY & NEUROLOGY

## 2018-08-25 RX ADMIN — LEVOTHYROXINE SODIUM 25 MCG: 25 TABLET ORAL at 05:58

## 2018-08-25 RX ADMIN — SENNOSIDES AND DOCUSATE SODIUM 1 TABLET: 8.6; 5 TABLET ORAL at 22:28

## 2018-08-25 RX ADMIN — DOCUSATE SODIUM 100 MG: 100 CAPSULE, LIQUID FILLED ORAL at 09:38

## 2018-08-25 RX ADMIN — Medication 325 MG: at 09:38

## 2018-08-25 RX ADMIN — QUETIAPINE FUMARATE 400 MG: 100 TABLET ORAL at 22:28

## 2018-08-25 RX ADMIN — PANTOPRAZOLE SODIUM 40 MG: 40 TABLET, DELAYED RELEASE ORAL at 22:28

## 2018-08-25 RX ADMIN — DOCUSATE SODIUM 100 MG: 100 CAPSULE, LIQUID FILLED ORAL at 18:56

## 2018-08-25 RX ADMIN — PAROXETINE HYDROCHLORIDE 10 MG: 10 TABLET, FILM COATED ORAL at 09:38

## 2018-08-25 RX ADMIN — Medication 325 MG: at 18:56

## 2018-08-25 NOTE — PROGRESS NOTES
Patient was cooperative with medications and pleasant in conversation this morning  Denied symptoms and needs, other than missing her cat

## 2018-08-25 NOTE — CONSULTS
Consultation - Neuropsychology/Psychology Department  William Thompson 62 y o  female MRN: [id number]  Unit/Bed#: HO5 768-02 Encounter: [id number]        BACKGROUND:  William Thompson is a 62y o  year old female who was referred for a Neuropsychological Exam to assess cognitive functioning  History of Present Illness  Depression with suicide attempt by overdose    Physician Requesting Consult: Rudy Gomez MD  Consults      Historical Information   Past Medical History:   Diagnosis Date    Anxiety     Back pain at L4-L5 level     Schreiber esophagus     Bulimia     Disease of thyroid gland     hypothyroid    GERD (gastroesophageal reflux disease)     Hyperlipidemia     Hypothyroidism     Leukopenia     Sciatica     Seizure ([address] )     Synovial cyst of lumbar spine     Vertigo     Weight gain      Past Surgical History:   Procedure Laterality Date    BONE MARROW BIOPSY      BREAST SURGERY      enlargement     RHINOPLASTY       Social History   History   Alcohol Use No     History   Drug Use No     History   Smoking Status    Never Smoker   Smokeless Tobacco    Never Used     Family History:   Family History   Problem Relation Age of Onset    Uterine cancer Mother     Esophageal cancer Father     Colon cancer Maternal Grandmother        Meds/Allergies   current meds:   Current Facility-Administered Medications   Medication Dose Route Frequency    acetaminophen (TYLENOL) tablet 650 mg  650 mg Oral Q6H PRN    acetaminophen (TYLENOL) tablet 975 mg  975 mg Oral Q6H PRN    benztropine (COGENTIN) injection 1 mg  1 mg Intramuscular Q6H PRN    benztropine (COGENTIN) tablet 1 mg  1 mg Oral Q6H PRN    calcium carbonate (TUMS) chewable tablet 500 mg  500 mg Oral Daily PRN    docusate sodium (COLACE) capsule 100 mg  100 mg Oral BID    ferrous sulfate tablet 325 mg  325 mg Oral BID    haloperidol (HALDOL) tablet 5 mg  5 mg Oral Q6H PRN    haloperidol lactate (HALDOL) injection 5 mg  5 mg Intramuscular Q6H PRN    hydrOXYzine HCL (ATARAX) tablet 25 mg  25 mg Oral Q6H PRN    ibuprofen (MOTRIN) tablet 600 mg  600 mg Oral Q8H PRN    lactulose 20 g/30 mL oral solution 20 g  20 g Oral BID PRN    levothyroxine tablet 25 mcg  25 mcg Oral Early Morning    LORazepam (ATIVAN) 2 mg/mL injection 1 mg  1 mg Intramuscular Q6H PRN    LORazepam (ATIVAN) tablet 1 mg  1 mg Oral Q8H PRN    magnesium hydroxide (MILK OF MAGNESIA) 400 mg/5 mL oral suspension 30 mL  30 mL Oral Daily PRN    pantoprazole (PROTONIX) EC tablet 40 mg  40 mg Oral HS    PARoxetine (PAXIL) tablet 10 mg  10 mg Oral Daily    QUEtiapine (SEROquel) tablet 400 mg  400 mg Oral HS    [START ON 8/26/2018] QUEtiapine (SEROquel) tablet 450 mg  450 mg Oral HS    [START ON 8/27/2018] QUEtiapine (SEROquel) tablet 500 mg  500 mg Oral HS    senna-docusate sodium (SENOKOT S) 8 6-50 mg per tablet 1 tablet  1 tablet Oral HS    traZODone (DESYREL) tablet 50 mg  50 mg Oral HS PRN       Allergies   Allergen Reactions    Latex     Risperdal [Risperidone]     Zyprexa [Olanzapine]        Imaging Studies:       Family and Social Support:   No Data Recorded     Behavioral Observations: Alert, oriented x 3, cooperative; affect appeared pleasant, denied depressed mood and anxiety; denied auditory and visual hallucinations or changes in cognitive functioning  Patient said her mother said hurtful things which caused her to overdose  She further stated, "I wanted to feel better and not commit suicide"  Patient reported multiple prior hospitalizations, sleep and appetite described as good  Note, patient's thoughts were highly repetitive, appearing forgetful of prior conversation       Cognitive Examination    General Cognitive Functioning  MMSE = 29/30 with deficit in recall Average; General Fund of Information = Average    Attention/Concertration  Auditory Selective Attention = Average; Visual Attention/tracking = Borderline;  Auditory Vigilance = Average; Information Processing Speed = Low Average/Average    Frontal Systems/Executive Functioning  Mental Flexibility/Cognitive Control = Low Average; Working Memory = Average; Abstract Reasoning = Average; Novel Spatial Problem Solving = Average; Generative Ability = Average; Spatial Conceptualization = Average;     Language Functioning  Confrontation naming = Average; Phonemic Fluency = Average; Semantic Retrieval = Average; Comprehension of Complex Ideational Material = Average;  Praxis = WNL's; Repetition = WNL'S; Basic Reading = WNL's; Written Expression = WNL's; Following Commands = WNL's    Memory Functioning  Narrative Recall - Short Delay = Low Average/Average; Long Delay Narrative Recall = Low Average/Average;  Visual Free Recall = Impaired; Visual Recognition = Impaired    Visuo-Spatial Abilities  Visual Perception = Average; Visuo-Construction (Graphomotor MARYBETH) = Average; Visuo-Construction (Blocks) = Average; Visuo-Construction ([employer]) = WNL's    Emotional Functioning: Affect pleasant, denied depressed mood and anxiety    Summary/Impression: Results of Neuropsychological Exam revealed DEFICITS in only visual free recall and visual recognition memory  Her auditory recall and all other areas of cognitive functioning were Within Normal Limits  Her repetition of conversation may be secondary to disorganized thought processes and not to primary auditory recall deficit  Schizoaffective Disorder, Bulimia, and ABIMAEL by History  Recommendations:  Strongly recommend weekly psychotherapy and repeat Neuropsychological Exam in 9 -12 months to clarify diagnoses

## 2018-08-25 NOTE — PROGRESS NOTES
Progress Note - Behavioral Health   Janny Garcia 62 y o  female MRN: [id number]  Unit/Bed#: YM6 768-02 Encounter: [id number]    Assessment/Plan   Principal Problem:    Schizoaffective disorder, depressive type ([address] )  Active Problems:    Bulimia nervosa in remission    ABIMAEL (generalized anxiety disorder)      Behavior over the last 24 hours:  unchanged  Sleep: normal  Appetite: normal  Medication side effects: No  ROS: no complaints    Mental Status Evaluation:  Appearance:  disheveled   Behavior:  restless and fidgety   Speech:  loud   Mood:  labile   Affect:  mood-congruent   Thought Process:  goal directed   Thought Content:  denies   Perceptual Disturbances: denies   Risk Potential: Suicidal Ideations none   Sensorium:  person and place   Cognition:  grossly intact   Consciousness:  awake    Attention: attention span and concentration were age appropriate   Insight:  limited   Judgment: limited   Gait/Station: normal gait/station   Motor Activity: no abnormal movements     Progress Toward Goals: Pt presents mildly pressured with racing thoughts  Quite grandiose in her assertions but medication compliant with no reported side effects  Recommended Treatment:  1-Cont current treatment  2- Continue with group therapy, milieu therapy and occupational therapy  Risks, benefits and possible side effects of Medications:   Risks, benefits, and possible side effects of medications explained to patient and patient verbalizes understanding        Medications:   current meds:   Current Facility-Administered Medications   Medication Dose Route Frequency    acetaminophen (TYLENOL) tablet 650 mg  650 mg Oral Q6H PRN    acetaminophen (TYLENOL) tablet 975 mg  975 mg Oral Q6H PRN    benztropine (COGENTIN) injection 1 mg  1 mg Intramuscular Q6H PRN    benztropine (COGENTIN) tablet 1 mg  1 mg Oral Q6H PRN    calcium carbonate (TUMS) chewable tablet 500 mg  500 mg Oral Daily PRN    docusate sodium (COLACE) capsule 100 mg  100 mg Oral BID    ferrous sulfate tablet 325 mg  325 mg Oral BID    haloperidol (HALDOL) tablet 5 mg  5 mg Oral Q6H PRN    haloperidol lactate (HALDOL) injection 5 mg  5 mg Intramuscular Q6H PRN    hydrOXYzine HCL (ATARAX) tablet 25 mg  25 mg Oral Q6H PRN    ibuprofen (MOTRIN) tablet 600 mg  600 mg Oral Q8H PRN    lactulose 20 g/30 mL oral solution 20 g  20 g Oral BID PRN    levothyroxine tablet 25 mcg  25 mcg Oral Early Morning    LORazepam (ATIVAN) 2 mg/mL injection 1 mg  1 mg Intramuscular Q6H PRN    LORazepam (ATIVAN) tablet 1 mg  1 mg Oral Q8H PRN    magnesium hydroxide (MILK OF MAGNESIA) 400 mg/5 mL oral suspension 30 mL  30 mL Oral Daily PRN    pantoprazole (PROTONIX) EC tablet 40 mg  40 mg Oral HS    PARoxetine (PAXIL) tablet 10 mg  10 mg Oral Daily    QUEtiapine (SEROquel) tablet 400 mg  400 mg Oral HS    [START ON 8/26/2018] QUEtiapine (SEROquel) tablet 450 mg  450 mg Oral HS    [START ON 8/27/2018] QUEtiapine (SEROquel) tablet 500 mg  500 mg Oral HS    senna-docusate sodium (SENOKOT S) 8 6-50 mg per tablet 1 tablet  1 tablet Oral HS    traZODone (DESYREL) tablet 50 mg  50 mg Oral HS PRN     Labs: I have personally reviewed pt's labs    Counseling / Coordination of Care  Total floor / unit time spent today 25 minutes  Greater than 50% of total time was spent with the patient and / or family counseling and / or coordination of care   A description of the counseling / coordination of care:

## 2018-08-26 PROCEDURE — 99231 SBSQ HOSP IP/OBS SF/LOW 25: CPT | Performed by: PSYCHIATRY & NEUROLOGY

## 2018-08-26 RX ADMIN — QUETIAPINE FUMARATE 450 MG: 100 TABLET ORAL at 22:20

## 2018-08-26 RX ADMIN — Medication 325 MG: at 19:27

## 2018-08-26 RX ADMIN — Medication 325 MG: at 08:42

## 2018-08-26 RX ADMIN — LEVOTHYROXINE SODIUM 25 MCG: 25 TABLET ORAL at 06:29

## 2018-08-26 RX ADMIN — PAROXETINE HYDROCHLORIDE 10 MG: 10 TABLET, FILM COATED ORAL at 08:42

## 2018-08-26 RX ADMIN — DOCUSATE SODIUM 100 MG: 100 CAPSULE, LIQUID FILLED ORAL at 08:42

## 2018-08-26 RX ADMIN — DOCUSATE SODIUM 100 MG: 100 CAPSULE, LIQUID FILLED ORAL at 19:27

## 2018-08-26 RX ADMIN — SENNOSIDES AND DOCUSATE SODIUM 1 TABLET: 8.6; 5 TABLET ORAL at 22:21

## 2018-08-26 RX ADMIN — PANTOPRAZOLE SODIUM 40 MG: 40 TABLET, DELAYED RELEASE ORAL at 22:21

## 2018-08-26 NOTE — PROGRESS NOTES
Patient was pleasant in conversation and cooperative with medications this morning  Denied symptoms and needs

## 2018-08-26 NOTE — PROGRESS NOTES
Progress Note - Behavioral Health   Damián Kilgore 62 y o  female MRN: [id number]  Unit/Bed#: PI5 768-02 Encounter: [id number]    Assessment/Plan   Principal Problem:    Schizoaffective disorder, depressive type ([address] )  Active Problems:    Bulimia nervosa in remission    ABIMAEL (generalized anxiety disorder)      Behavior over the last 24 hours:  unchanged  Sleep: normal  Appetite: normal  Medication side effects: No  ROS: no complaints    Mental Status Evaluation:  Appearance:  disheveled   Behavior:  restless and fidgety   Speech:  loud   Mood:  labile   Affect:  mood-congruent   Thought Process:  goal directed   Thought Content:  denies   Perceptual Disturbances: denies   Risk Potential: Suicidal Ideations none   Sensorium:  person and place   Cognition:  grossly intact   Consciousness:  awake    Attention: attention span and concentration were age appropriate   Insight:  limited   Judgment: limited   Gait/Station: normal gait/station   Motor Activity: no abnormal movements     Progress Toward Goals:Pt cont to report an overall improvement with her mood,denies anhedonia or despair,nil sleep or appetite issues  She does remainsrather grandiose in her assertions but medication compliant with no reported side effects  Recommended Treatment:  1-Cont current treatment  2- Continue with group therapy, milieu therapy and occupational therapy  Risks, benefits and possible side effects of Medications:   Risks, benefits, and possible side effects of medications explained to patient and patient verbalizes understanding        Medications:   current meds:   Current Facility-Administered Medications   Medication Dose Route Frequency    acetaminophen (TYLENOL) tablet 650 mg  650 mg Oral Q6H PRN    acetaminophen (TYLENOL) tablet 975 mg  975 mg Oral Q6H PRN    benztropine (COGENTIN) injection 1 mg  1 mg Intramuscular Q6H PRN    benztropine (COGENTIN) tablet 1 mg  1 mg Oral Q6H PRN    calcium carbonate (TUMS) chewable tablet 500 mg  500 mg Oral Daily PRN    docusate sodium (COLACE) capsule 100 mg  100 mg Oral BID    ferrous sulfate tablet 325 mg  325 mg Oral BID    haloperidol (HALDOL) tablet 5 mg  5 mg Oral Q6H PRN    haloperidol lactate (HALDOL) injection 5 mg  5 mg Intramuscular Q6H PRN    hydrOXYzine HCL (ATARAX) tablet 25 mg  25 mg Oral Q6H PRN    ibuprofen (MOTRIN) tablet 600 mg  600 mg Oral Q8H PRN    lactulose 20 g/30 mL oral solution 20 g  20 g Oral BID PRN    levothyroxine tablet 25 mcg  25 mcg Oral Early Morning    LORazepam (ATIVAN) 2 mg/mL injection 1 mg  1 mg Intramuscular Q6H PRN    LORazepam (ATIVAN) tablet 1 mg  1 mg Oral Q8H PRN    magnesium hydroxide (MILK OF MAGNESIA) 400 mg/5 mL oral suspension 30 mL  30 mL Oral Daily PRN    pantoprazole (PROTONIX) EC tablet 40 mg  40 mg Oral HS    PARoxetine (PAXIL) tablet 10 mg  10 mg Oral Daily    QUEtiapine (SEROquel) tablet 450 mg  450 mg Oral HS    [START ON 8/27/2018] QUEtiapine (SEROquel) tablet 500 mg  500 mg Oral HS    senna-docusate sodium (SENOKOT S) 8 6-50 mg per tablet 1 tablet  1 tablet Oral HS    traZODone (DESYREL) tablet 50 mg  50 mg Oral HS PRN     Labs: I have personally reviewed pt's labs    Counseling / Coordination of Care  Total floor / unit time spent today 25 minutes  Greater than 50% of total time was spent with the patient and / or family counseling and / or coordination of care   A description of the counseling / coordination of care:

## 2018-08-27 PROCEDURE — 99232 SBSQ HOSP IP/OBS MODERATE 35: CPT | Performed by: PSYCHIATRY & NEUROLOGY

## 2018-08-27 RX ADMIN — DOCUSATE SODIUM 100 MG: 100 CAPSULE, LIQUID FILLED ORAL at 08:25

## 2018-08-27 RX ADMIN — LEVOTHYROXINE SODIUM 25 MCG: 25 TABLET ORAL at 05:58

## 2018-08-27 RX ADMIN — SENNOSIDES AND DOCUSATE SODIUM 1 TABLET: 8.6; 5 TABLET ORAL at 21:20

## 2018-08-27 RX ADMIN — PAROXETINE HYDROCHLORIDE 10 MG: 10 TABLET, FILM COATED ORAL at 08:25

## 2018-08-27 RX ADMIN — PANTOPRAZOLE SODIUM 40 MG: 40 TABLET, DELAYED RELEASE ORAL at 21:20

## 2018-08-27 RX ADMIN — Medication 325 MG: at 17:40

## 2018-08-27 RX ADMIN — DOCUSATE SODIUM 100 MG: 100 CAPSULE, LIQUID FILLED ORAL at 17:40

## 2018-08-27 RX ADMIN — QUETIAPINE FUMARATE 500 MG: 100 TABLET ORAL at 21:20

## 2018-08-27 RX ADMIN — Medication 325 MG: at 08:25

## 2018-08-27 NOTE — CASE MANAGEMENT
ASIM rec'd message on 8/24, from Madisyn Jean Baptiste, [facility] [address] [place] [address], [phone], ext [phone]  ASIM returned call   Left message @ 3:10 PM

## 2018-08-27 NOTE — TELEPHONE ENCOUNTER
FYI: Spoke to pts mother, she is in the hospital again and had to cancel RFA for tomorrow  Now scheduled RT RFA on Tues 09/18/18 and LT RFA on Tues 10/2/18

## 2018-08-27 NOTE — PROGRESS NOTES
Pt denies all signs and symptoms  She has been calm, friendly, bright  She states she feels ready for discharge  Pt is cooperative with medications  RN spoke with pt about using coping skills in the future to handle emotions, disappointments and for handling mother's dementia  Pt agrees she needs a "springboard" in the form of a therapist to help her manage these things and states she will not take extra medication again to help her "feel better"  Pt is eager for d/c home to mother

## 2018-08-27 NOTE — PROGRESS NOTES
Progress Note - Behavioral Health   Luster Goltz 62 y o  female MRN: [id number]  Unit/Bed#: WK7 161-72 Encounter: [id number]      Patient seen, chart reviewed, discussed with staff  Nursing staff notes the patient has been calm and compliant with medications and treatment plan  She was evaluated by Dr Nyasia Sullivan  On August 24, 2018 and mini-mental status was 29/30  Recommendation was made for weekly psychotherapy and repeat neuro psychological exam  In 9-  12 months  to clarify diagnosis for repetition in conversation due to disorganized thought process verses primary auditory recall deficit  patient states that her depression and anxiety has improved since her admission  She denies any adverse effects with her medications  She feels as though she is able to handle things better and is anticipating to continue with individual therapy after discharge from the hospital   Discussed frustrations and challenges with her mother who suffers from dementia  Overall she reports an optimistic outlook and is able to identify several positive thing she looks forward to in the future  She denies any suicidal or homicidal ideation  She denies any auditory or visual hallucinations  Patient reports that she is remorseful for her overdose      Behavior over the last 24 hours:  improved  Sleep: normal  Appetite: normal  Medication side effects: No  ROS: no complaints  /87 (BP Location: Left arm)   Pulse (!) 107   Temp 98 5 °F (36 9 °C) (Oral)   Resp 15   Ht 5' 3" (1 6 m)   Wt 77 1 kg (170 lb)   SpO2 98%   BMI 30 11 kg/m²     Medications:   Current Facility-Administered Medications   Medication Dose Route Frequency    acetaminophen (TYLENOL) tablet 650 mg  650 mg Oral Q6H PRN    acetaminophen (TYLENOL) tablet 975 mg  975 mg Oral Q6H PRN    benztropine (COGENTIN) injection 1 mg  1 mg Intramuscular Q6H PRN    benztropine (COGENTIN) tablet 1 mg  1 mg Oral Q6H PRN    calcium carbonate (TUMS) chewable tablet 500 mg  500 mg Oral Daily PRN    docusate sodium (COLACE) capsule 100 mg  100 mg Oral BID    ferrous sulfate tablet 325 mg  325 mg Oral BID    haloperidol (HALDOL) tablet 5 mg  5 mg Oral Q6H PRN    haloperidol lactate (HALDOL) injection 5 mg  5 mg Intramuscular Q6H PRN    hydrOXYzine HCL (ATARAX) tablet 25 mg  25 mg Oral Q6H PRN    ibuprofen (MOTRIN) tablet 600 mg  600 mg Oral Q8H PRN    lactulose 20 g/30 mL oral solution 20 g  20 g Oral BID PRN    levothyroxine tablet 25 mcg  25 mcg Oral Early Morning    LORazepam (ATIVAN) 2 mg/mL injection 1 mg  1 mg Intramuscular Q6H PRN    LORazepam (ATIVAN) tablet 1 mg  1 mg Oral Q8H PRN    magnesium hydroxide (MILK OF MAGNESIA) 400 mg/5 mL oral suspension 30 mL  30 mL Oral Daily PRN    pantoprazole (PROTONIX) EC tablet 40 mg  40 mg Oral HS    PARoxetine (PAXIL) tablet 10 mg  10 mg Oral Daily    QUEtiapine (SEROquel) tablet 500 mg  500 mg Oral HS    senna-docusate sodium (SENOKOT S) 8 6-50 mg per tablet 1 tablet  1 tablet Oral HS    traZODone (DESYREL) tablet 50 mg  50 mg Oral HS PRN       Labs:   Admission on 08/20/2018   Component Date Value Ref Range Status    Cholesterol 08/21/2018 205* 50 - 200 mg/dL Final    Triglycerides 08/21/2018 86  <=150 mg/dL Final    HDL, Direct 08/21/2018 38* 40 - 60 mg/dL Final    LDL Calculated 08/21/2018 150* 0 - 100 mg/dL Final    Non-HDL-Chol (CHOL-HDL) 08/21/2018 167  mg/dl Final    Sodium 08/21/2018 130* 136 - 145 mmol/L Final    Potassium 08/21/2018 3 7  3 5 - 5 3 mmol/L Final    Chloride 08/21/2018 94* 100 - 108 mmol/L Final    CO2 08/21/2018 30  21 - 32 mmol/L Final    ANION GAP 08/21/2018 6  4 - 13 mmol/L Final    BUN 08/21/2018 8  5 - 25 mg/dL Final    Creatinine 08/21/2018 0 58* 0 60 - 1 30 mg/dL Final    Glucose 08/21/2018 95  65 - 140 mg/dL Final    Glucose, Fasting 08/21/2018 95  65 - 99 mg/dL Final    Calcium 08/21/2018 8 8  8 3 - 10 1 mg/dL Final    AST 08/21/2018 21  5 - 45 U/L Final    ALT 08/21/2018 30  12 - 78 U/L Final    Alkaline Phosphatase 08/21/2018 66  46 - 116 U/L Final    Total Protein 08/21/2018 6 6  6 4 - 8 2 g/dL Final    Albumin 08/21/2018 3 2* 3 5 - 5 0 g/dL Final    Total Bilirubin 08/21/2018 0 31  0 20 - 1 00 mg/dL Final    eGFR 08/21/2018 103  ml/min/1 73sq m Final    WBC 08/21/2018 3 85* 4 31 - 10 16 Thousand/uL Final    RBC 08/21/2018 3 83  3 81 - 5 12 Million/uL Final    Hemoglobin 08/21/2018 11 3* 11 5 - 15 4 g/dL Final    Hematocrit 08/21/2018 33 9* 34 8 - 46 1 % Final    MCV 08/21/2018 89  82 - 98 fL Final    MCH 08/21/2018 29 5  26 8 - 34 3 pg Final    MCHC 08/21/2018 33 3  31 4 - 37 4 g/dL Final    RDW 08/21/2018 14 1  11 6 - 15 1 % Final    MPV 08/21/2018 8 3* 8 9 - 12 7 fL Final    Platelets 29/58/1294 324  149 - 390 Thousands/uL Final    nRBC 08/21/2018 0  /100 WBCs Final    Neutrophils Relative 08/21/2018 65  43 - 75 % Final    Immat GRANS % 08/21/2018 1  0 - 2 % Final    Lymphocytes Relative 08/21/2018 18  14 - 44 % Final    Monocytes Relative 08/21/2018 14* 4 - 12 % Final    Eosinophils Relative 08/21/2018 1  0 - 6 % Final    Basophils Relative 08/21/2018 1  0 - 1 % Final    Neutrophils Absolute 08/21/2018 2 49  1 85 - 7 62 Thousands/µL Final    Immature Grans Absolute 08/21/2018 0 05  0 00 - 0 20 Thousand/uL Final    Lymphocytes Absolute 08/21/2018 0 69  0 60 - 4 47 Thousands/µL Final    Monocytes Absolute 08/21/2018 0 55  0 17 - 1 22 Thousand/µL Final    Eosinophils Absolute 08/21/2018 0 05  0 00 - 0 61 Thousand/µL Final    Basophils Absolute 08/21/2018 0 02  0 00 - 0 10 Thousands/µL Final    T3, Free 08/21/2018 2 47  2 30 - 4 20 pg/mL Final    Free T4 08/21/2018 1 01  0 76 - 1 46 ng/dL Final    TSH 3RD GENERATON 08/21/2018 2 720  0 358 - 3 740 uIU/mL Final       Mental Status Evaluation:  Appearance:  age appropriate and casually dressed   Behavior:    Calm, cooperative, good eye contact   Speech:  normal pitch and normal volume,  Repetitive at times   Mood:   less depressed, less anxious   Affect:  mood-congruent   Thought Process:  goal directed   Thought Content:   no delusions voiced   Perceptual Disturbances:  denies auditory or visual hallucinations   Risk Potential: Suicidal Ideations none, Homicidal Ideations none and Potential for Aggression No   Sensorium:  person, place, time/date and situation   Cognition:  grossly intact   Consciousness:  alert and awake    Attention: attention span appeared  fair   Insight:  fair   Judgment: fair   Gait/Station: normal gait/station   Motor Activity: no abnormal movements     Progress Toward Goals:  Approaching baseline    Assessment/Plan   Principal Problem:    Schizoaffective disorder, depressive type (HCC)  Active Problems:    Bulimia nervosa in remission    ABIMAEL (generalized anxiety disorder)      Recommended Treatment:     Continue Seroquel 500 mg p o  Q h s  And paroxetine 10 mg p o  Daily  Patient is tolerating medications well and is anticipating follow up on outpatient basis with Psychiatry and counseling services  Disposition and discharge is pending in the next 1-2 days if patient is stable  Continue with group therapy, milieu therapy and occupational therapy  Risks, benefits and possible side effects of Medications:   Risks, benefits, and possible side effects of medications explained to patient and patient verbalizes understanding  Counseling / Coordination of Care  Total floor / unit time spent today 30 minutes  Greater than 50% of total time was spent with the patient and / or family counseling and / or coordination of care  A description of the counseling / coordination of care:  Medication management, chart review, patient interview

## 2018-08-27 NOTE — PROGRESS NOTES
PT denies all s/s  Pt talks about her cat she adopted  Pt is stating that she is suppose to be for D/C Tuesday

## 2018-08-27 NOTE — PROGRESS NOTES
Pt denies SI and that since being admitted she is less depressed and anxious  Pt is hopeful for d/c by Wednesday  Pt is visible and social in the milieu

## 2018-08-27 NOTE — PLAN OF CARE
Problem: SELF HARM/SUICIDALITY  Goal: Will have no self-injury during hospital stay  INTERVENTIONS:  - Q 15 MINUTES: Routine safety checks  - Q WAKING SHIFT & PRN: Assess risk to determine if routine checks are adequate to maintain patient safety  - Encourage patient to participate actively in care by formulating a plan to combat response to suicidal ideation, identify supports and resources   Outcome: Adequate for Discharge      Problem: DEPRESSION  Goal: Will be euthymic at discharge  INTERVENTIONS:  - Administer medication as ordered  - Provide emotional support via 1:1 interaction with staff  - Encourage involvement in milieu/groups/activities  - Monitor for social isolation   Outcome: Adequate for Discharge      Problem: DISCHARGE PLANNING  Goal: Discharge to home or other facility with appropriate resources  INTERVENTIONS:  - Identify barriers to discharge w/patient and caregiver  - Arrange for needed discharge resources and transportation as appropriate  - Identify discharge learning needs (meds, wound care, etc )  - Refer to Case Management Department for coordinating discharge planning if the patient needs post-hospital services based on physician/advanced practitioner order or complex needs related to functional status, cognitive ability, or social support system   Outcome: Adequate for Discharge      Problem: Ineffective Coping  Goal: Participates in unit activities  Interventions:  - Provide therapeutic environment   - Provide required programming   - Redirect inappropriate behaviors    Outcome: Adequate for Discharge

## 2018-08-28 PROCEDURE — 99232 SBSQ HOSP IP/OBS MODERATE 35: CPT | Performed by: PSYCHIATRY & NEUROLOGY

## 2018-08-28 RX ADMIN — DOCUSATE SODIUM 100 MG: 100 CAPSULE, LIQUID FILLED ORAL at 08:42

## 2018-08-28 RX ADMIN — SENNOSIDES AND DOCUSATE SODIUM 1 TABLET: 8.6; 5 TABLET ORAL at 22:15

## 2018-08-28 RX ADMIN — DOCUSATE SODIUM 100 MG: 100 CAPSULE, LIQUID FILLED ORAL at 18:29

## 2018-08-28 RX ADMIN — Medication 325 MG: at 18:28

## 2018-08-28 RX ADMIN — LEVOTHYROXINE SODIUM 25 MCG: 25 TABLET ORAL at 05:59

## 2018-08-28 RX ADMIN — QUETIAPINE FUMARATE 500 MG: 100 TABLET ORAL at 22:14

## 2018-08-28 RX ADMIN — Medication 325 MG: at 08:42

## 2018-08-28 RX ADMIN — PAROXETINE HYDROCHLORIDE 10 MG: 10 TABLET, FILM COATED ORAL at 08:42

## 2018-08-28 RX ADMIN — PANTOPRAZOLE SODIUM 40 MG: 40 TABLET, DELAYED RELEASE ORAL at 22:14

## 2018-08-28 NOTE — PROGRESS NOTES
Pt denies SI  She denies any other psychiatric symptoms  Pt states she feels "much better" now that she has been restarted on her medications for a while  She reiterates that she will not attempt to take "extra" medications again and will utilize her coping skills  Pt also states she is aware she needs to be more understanding and calmer with her mother and her dementia  Pt requested additional information on dementia; this will be provided  Pt is visible in milieu, social and pleasant

## 2018-08-28 NOTE — PROGRESS NOTES
Progress Note - Behavioral Health   Fernandez Coronado 62 y o  female MRN: [id number]  Unit/Bed#: QS5 653-55 Encounter: [id number]    Patient seen, chart reviewed, discussed with treatment team   Nursing staff notes that the patient has been compliant with medications and treatment plan  The patient appears to be less depressed and has been participating in groups  The patient did sleep well last night has adequate appetite  Patient states that she is overall feeling less anxious and she was initially admitted  The patient does appear to be calmer and more focused and conversation  Able to identify coping strategies for when she is discharged from hospital   She is more hopeful and optimistic and also able to identify goals and thing she looks for two in the future  Denies suicidal ideation and is remorseful for her recent attempts      Behavior over the last 24 hours:  improved  Sleep: normal  Appetite: normal  Medication side effects: No  ROS: no complaints  /77   Pulse (!) 112   Temp 97 6 °F (36 4 °C)   Resp 16   Ht 5' 3" (1 6 m)   Wt 77 1 kg (170 lb)   SpO2 98%   BMI 30 11 kg/m²     Medications:   Current Facility-Administered Medications   Medication Dose Route Frequency    acetaminophen (TYLENOL) tablet 650 mg  650 mg Oral Q6H PRN    acetaminophen (TYLENOL) tablet 975 mg  975 mg Oral Q6H PRN    benztropine (COGENTIN) injection 1 mg  1 mg Intramuscular Q6H PRN    benztropine (COGENTIN) tablet 1 mg  1 mg Oral Q6H PRN    calcium carbonate (TUMS) chewable tablet 500 mg  500 mg Oral Daily PRN    docusate sodium (COLACE) capsule 100 mg  100 mg Oral BID    ferrous sulfate tablet 325 mg  325 mg Oral BID    haloperidol (HALDOL) tablet 5 mg  5 mg Oral Q6H PRN    haloperidol lactate (HALDOL) injection 5 mg  5 mg Intramuscular Q6H PRN    hydrOXYzine HCL (ATARAX) tablet 25 mg  25 mg Oral Q6H PRN    ibuprofen (MOTRIN) tablet 600 mg  600 mg Oral Q8H PRN    lactulose 20 g/30 mL oral solution 20 g  20 g Oral BID PRN    levothyroxine tablet 25 mcg  25 mcg Oral Early Morning    LORazepam (ATIVAN) 2 mg/mL injection 1 mg  1 mg Intramuscular Q6H PRN    LORazepam (ATIVAN) tablet 1 mg  1 mg Oral Q8H PRN    magnesium hydroxide (MILK OF MAGNESIA) 400 mg/5 mL oral suspension 30 mL  30 mL Oral Daily PRN    pantoprazole (PROTONIX) EC tablet 40 mg  40 mg Oral HS    PARoxetine (PAXIL) tablet 10 mg  10 mg Oral Daily    QUEtiapine (SEROquel) tablet 500 mg  500 mg Oral HS    senna-docusate sodium (SENOKOT S) 8 6-50 mg per tablet 1 tablet  1 tablet Oral HS    traZODone (DESYREL) tablet 50 mg  50 mg Oral HS PRN       Labs:   Admission on 08/20/2018   Component Date Value Ref Range Status    Cholesterol 08/21/2018 205* 50 - 200 mg/dL Final    Triglycerides 08/21/2018 86  <=150 mg/dL Final    HDL, Direct 08/21/2018 38* 40 - 60 mg/dL Final    LDL Calculated 08/21/2018 150* 0 - 100 mg/dL Final    Non-HDL-Chol (CHOL-HDL) 08/21/2018 167  mg/dl Final    Sodium 08/21/2018 130* 136 - 145 mmol/L Final    Potassium 08/21/2018 3 7  3 5 - 5 3 mmol/L Final    Chloride 08/21/2018 94* 100 - 108 mmol/L Final    CO2 08/21/2018 30  21 - 32 mmol/L Final    ANION GAP 08/21/2018 6  4 - 13 mmol/L Final    BUN 08/21/2018 8  5 - 25 mg/dL Final    Creatinine 08/21/2018 0 58* 0 60 - 1 30 mg/dL Final    Glucose 08/21/2018 95  65 - 140 mg/dL Final    Glucose, Fasting 08/21/2018 95  65 - 99 mg/dL Final    Calcium 08/21/2018 8 8  8 3 - 10 1 mg/dL Final    AST 08/21/2018 21  5 - 45 U/L Final    ALT 08/21/2018 30  12 - 78 U/L Final    Alkaline Phosphatase 08/21/2018 66  46 - 116 U/L Final    Total Protein 08/21/2018 6 6  6 4 - 8 2 g/dL Final    Albumin 08/21/2018 3 2* 3 5 - 5 0 g/dL Final    Total Bilirubin 08/21/2018 0 31  0 20 - 1 00 mg/dL Final    eGFR 08/21/2018 103  ml/min/1 73sq m Final    WBC 08/21/2018 3 85* 4 31 - 10 16 Thousand/uL Final    RBC 08/21/2018 3 83  3 81 - 5 12 Million/uL Final    Hemoglobin 08/21/2018 11 3* 11 5 - 15 4 g/dL Final    Hematocrit 08/21/2018 33 9* 34 8 - 46 1 % Final    MCV 08/21/2018 89  82 - 98 fL Final    MCH 08/21/2018 29 5  26 8 - 34 3 pg Final    MCHC 08/21/2018 33 3  31 4 - 37 4 g/dL Final    RDW 08/21/2018 14 1  11 6 - 15 1 % Final    MPV 08/21/2018 8 3* 8 9 - 12 7 fL Final    Platelets 22/01/5759 324  149 - 390 Thousands/uL Final    nRBC 08/21/2018 0  /100 WBCs Final    Neutrophils Relative 08/21/2018 65  43 - 75 % Final    Immat GRANS % 08/21/2018 1  0 - 2 % Final    Lymphocytes Relative 08/21/2018 18  14 - 44 % Final    Monocytes Relative 08/21/2018 14* 4 - 12 % Final    Eosinophils Relative 08/21/2018 1  0 - 6 % Final    Basophils Relative 08/21/2018 1  0 - 1 % Final    Neutrophils Absolute 08/21/2018 2 49  1 85 - 7 62 Thousands/µL Final    Immature Grans Absolute 08/21/2018 0 05  0 00 - 0 20 Thousand/uL Final    Lymphocytes Absolute 08/21/2018 0 69  0 60 - 4 47 Thousands/µL Final    Monocytes Absolute 08/21/2018 0 55  0 17 - 1 22 Thousand/µL Final    Eosinophils Absolute 08/21/2018 0 05  0 00 - 0 61 Thousand/µL Final    Basophils Absolute 08/21/2018 0 02  0 00 - 0 10 Thousands/µL Final    T3, Free 08/21/2018 2 47  2 30 - 4 20 pg/mL Final    Free T4 08/21/2018 1 01  0 76 - 1 46 ng/dL Final    TSH 3RD GENERATON 08/21/2018 2 720  0 358 - 3 740 uIU/mL Final       Mental Status Evaluation:  Appearance:  age appropriate and casually dressed   Behavior:  Calm, cooperative, good eye contact   Speech:  normal pitch and normal volume   Mood:  Less anxious less depressed   Affect:  mood-congruent   Thought Process:  goal directed and logical   Thought Content:  No delusions voiced   Perceptual Disturbances: Denies auditory or visual hallucinations   Risk Potential: Suicidal Ideations none, Homicidal Ideations none and Potential for Aggression No   Sensorium:  person, place and time/date   Cognition:  grossly intact   Consciousness:  alert and awake    Attention: Attention span and concentration are improved   Insight:  fair   Judgment: fair   Gait/Station: normal gait/station   Motor Activity: no abnormal movements     Progress Toward Goals:   Approaching baseline    Assessment/Plan   Principal Problem:    Schizoaffective disorder, depressive type (HCC)  Active Problems:    Bulimia nervosa in remission    ABIMAEL (generalized anxiety disorder)      Recommended Treatment:  Plan to discharge patient home tomorrow if stable  Continue with Seroquel 500 mg at HS and paroxetine 10 mg daily  Continue to encourage in milieu participation as tolerated  Continue with group therapy, milieu therapy and occupational therapy  Risks, benefits and possible side effects of Medications:   Risks, benefits, and possible side effects of medications explained to patient and patient verbalizes understanding  Counseling / Coordination of Care  Total floor / unit time spent today 30 minutes  Greater than 50% of total time was spent with the patient and / or family counseling and / or coordination of care   A description of the counseling / coordination of care:   Medication management, chart review, patient interview

## 2018-08-28 NOTE — CMS CERTIFICATION NOTE
Recertification: Based upon physical, mental and social evaluations, I certify that inpatient psychiatric services continue to be medically necessary for this patient for a duration of 5 midnights for the treatment of  Schizoaffective disorder, depressive type [facility])  Available alternative community resources still do not meet the patient's mental health care needs  I further attest that an established written individualized plan of care has been updated and is outlined in the patient's medical records

## 2018-08-28 NOTE — PLAN OF CARE
Problem: DISCHARGE PLANNING  Goal: Discharge to home or other facility with appropriate resources  INTERVENTIONS:  - Identify barriers to discharge w/patient and caregiver  - Arrange for needed discharge resources and transportation as appropriate  - Identify discharge learning needs (meds, wound care, etc )  - Refer to Case Management Department for coordinating discharge planning if the patient needs post-hospital services based on physician/advanced practitioner order or complex needs related to functional status, cognitive ability, or social support system   Outcome: Progressing  08/28/2018: Pt denied SI  Denied feelings of depression  More hopeful  Has learned coping skills to better deal with problems   SW has requested a therapist @ pt's out [facility]

## 2018-08-28 NOTE — CASE MANAGEMENT
ASIM called [address] [facility]  Spoke to [facility] Donny, [address] advised of pt's admission & D/C date for tomorrow  He scheduled appt with Dr Arian Smith on 9/5 @ 8 AM ASIM advised that pt would like to see a therapist  Vladimir said he'd put thru a request for pt to see a therapist      Lida Leyden met with pt  Pt denied SI  Reported feeling better now  Affect bright  ASIM reviewed Medicare D/C agreement form  Pt signed form & copy given to pt  ASIM advised of above  SW advised pt to ask to see a therapist when she met with Dr Arian Smith  Pt said she'd need a ride home  ASIM completed [facility] transport request  Faxed request to [facility]   ASIM called [facility] Demi, who said pt could be picked up tomorrow @ 11:30 AM  ASIM called pt's mother, Jorge Gamboa, [phone]   Left message @ 3:35 PM

## 2018-08-29 VITALS
SYSTOLIC BLOOD PRESSURE: 121 MMHG | BODY MASS INDEX: 30.12 KG/M2 | HEART RATE: 108 BPM | WEIGHT: 170 LBS | RESPIRATION RATE: 16 BRPM | OXYGEN SATURATION: 98 % | DIASTOLIC BLOOD PRESSURE: 77 MMHG | HEIGHT: 63 IN | TEMPERATURE: 98 F

## 2018-08-29 PROCEDURE — 99239 HOSP IP/OBS DSCHRG MGMT >30: CPT | Performed by: PSYCHIATRY & NEUROLOGY

## 2018-08-29 RX ORDER — QUETIAPINE FUMARATE 400 MG/1
400 TABLET, FILM COATED ORAL
Qty: 30 TABLET | Refills: 1 | Status: SHIPPED | OUTPATIENT
Start: 2018-08-29 | End: 2018-09-14 | Stop reason: SDUPTHER

## 2018-08-29 RX ORDER — PAROXETINE 10 MG/1
10 TABLET, FILM COATED ORAL DAILY
Qty: 30 TABLET | Refills: 1 | Status: SHIPPED | OUTPATIENT
Start: 2018-08-30 | End: 2018-09-14 | Stop reason: SDUPTHER

## 2018-08-29 RX ORDER — QUETIAPINE FUMARATE 100 MG/1
100 TABLET, FILM COATED ORAL
Qty: 30 TABLET | Refills: 1 | Status: SHIPPED | OUTPATIENT
Start: 2018-08-29 | End: 2018-10-16 | Stop reason: DRUGHIGH

## 2018-08-29 RX ORDER — AMOXICILLIN 250 MG
1 CAPSULE ORAL
Qty: 30 TABLET | Refills: 0 | Status: SHIPPED | OUTPATIENT
Start: 2018-08-29 | End: 2018-10-16 | Stop reason: ALTCHOICE

## 2018-08-29 RX ADMIN — Medication 325 MG: at 08:02

## 2018-08-29 RX ADMIN — PAROXETINE HYDROCHLORIDE 10 MG: 10 TABLET, FILM COATED ORAL at 08:02

## 2018-08-29 RX ADMIN — DOCUSATE SODIUM 100 MG: 100 CAPSULE, LIQUID FILLED ORAL at 08:02

## 2018-08-29 RX ADMIN — LEVOTHYROXINE SODIUM 25 MCG: 25 TABLET ORAL at 06:43

## 2018-08-29 NOTE — PROGRESS NOTES
Pt denies all symptoms  She is bright, pleasant, and looking forward to d/c planned for today  Pt states she spoke with her mother and is looking forward to going home

## 2018-08-29 NOTE — PLAN OF CARE
Problem: SELF HARM/SUICIDALITY  Goal: Will have no self-injury during hospital stay  INTERVENTIONS:  - Q 15 MINUTES: Routine safety checks  - Q WAKING SHIFT & PRN: Assess risk to determine if routine checks are adequate to maintain patient safety  - Encourage patient to participate actively in care by formulating a plan to combat response to suicidal ideation, identify supports and resources   Outcome: Completed Date Met: 08/29/18      Problem: DEPRESSION  Goal: Will be euthymic at discharge  INTERVENTIONS:  - Administer medication as ordered  - Provide emotional support via 1:1 interaction with staff  - Encourage involvement in milieu/groups/activities  - Monitor for social isolation   Outcome: Completed Date Met: 08/29/18      Problem: DISCHARGE PLANNING  Goal: Discharge to home or other facility with appropriate resources  INTERVENTIONS:  - Identify barriers to discharge w/patient and caregiver  - Arrange for needed discharge resources and transportation as appropriate  - Identify discharge learning needs (meds, wound care, etc )  - Refer to Case Management Department for coordinating discharge planning if the patient needs post-hospital services based on physician/advanced practitioner order or complex needs related to functional status, cognitive ability, or social support system   Outcome: Completed Date Met: 08/29/18      Problem: Ineffective Coping  Goal: Participates in unit activities  Interventions:  - Provide therapeutic environment   - Provide required programming   - Redirect inappropriate behaviors    Outcome: Completed Date Met: 08/29/18

## 2018-08-29 NOTE — CASE MANAGEMENT
ASIM called pt's mother, Conner Zamudio, [phone], @ 9:45 AM  SW asked how she felt pt was doing  Mother said pt was doing "great" & "wonderfully better"  Mother said she visited pt & has spoken to pt on the phone  SW said that pt's dr also agreed that pt was doing better & could be D/C today  Mother very elated  Very happy re: D/C  ASIM asked if she had concerns re: pt returning home  "Of course not " Mother said she'd come to pick pt up  SW advised that a [facility] would [facility] pt home  Mother very happy & said she'd be waiting for pt to arrive home  ASIM reviewed pt's D/C plan to see Dr Mickey Washington on 9/5 & to ask to see a therapist  Mother agreed with D/C plan

## 2018-08-29 NOTE — DISCHARGE INSTR - LAB
Contact Information: If you have any questions, concerns, pended studies, tests and/or procedures, or emergencies regarding your inpatient behavioral health visit  Please contact [facility] behavioral health unit [phone] and ask to speak to a , nurse or physician  A contact is available 24 hours/ 7 days a week at this number  Summary of Procedures Performed During your Stay:  Below is a list of major procedures performed during your hospital stay and a summary of results:  - No major procedures performed  Pending Studies     None        If studies are pending at discharge, follow up with your PCP and/or referring provider

## 2018-08-29 NOTE — DISCHARGE SUMMARY
Discharge Summary - [facility] ANNAMARIA Plascencia 62 y o  female MRN: [id number]  Unit/Bed#: CR4 941-83 Encounter: [id number]     Admission Date: 8/20/2018         Discharge Date: 8/29/2018    Attending Psychiatrist: Miriam Levi MD    Reason for Admission/HPI:     Aleah Espinoza is a 62 y o  female with a history of depression versus schizoaffective disorder who was admitted to the inpatient psychiatric unit on a voluntary 201 commitment basis due to depression and suicide attempt by overdose on multiple medications (Geodon and Paxil)     Symptoms prior to admission included worsening depression, suicidal ideation, suicide attempt, hopelessness, helplessness, poor concentration, poor appetite and difficulty sleeping  Onset of symptoms was gradual starting 1 year ago with progressively worsening course since that time  Stressors preceding admission included family conflict and mother's illness (possible dementia)  Gogo Lewis was brought in to ED by EMS after she attempted suicide by overdose on Geodon and Paxil (3 times her normal dose)  She was delirious on admission, had seizure activity and eventually had to be intubated and admitted to ICU  She was seen on Psychiatric Consultation Service and inpatient psychiatric admission was recommended once she was medically stabilized      On initial evaluation after admission to the inpatient psychiatric unit Gogo Lewis admitted to feeling depressed and anxious  She insisted that she did not remember events prior to admission and that she took pills "to feel better"  She was remorseful about overdose and was focusing on going back home to take care of her mother  She denied psychotic symptoms currently or in the past, but per old records had delusional thinking in the past  She also had odd, incongruent affect, somewhat pressured speech and limited insight into her illness      Past Psychiatric History:      Past Inpatient Psychiatric Treatment: Multiple past inpatient psychiatric admissions at [address] [place] [address] Rd  Past Outpatient Psychiatric Treatment:    Currently in outpatient psychiatric treatment with a psychiatrist Dr Guanakito Ge [facility]  Past Suicide Attempts: yes, by cutting wrists  Past Violent Behavior: no  Past Psychiatric Medication Trials: Paxil, Tofranil, Risperdal, Seroquel, Zyprexa, Geodon and Ativan     Substance Abuse History:    Alcohol use: denies use  Recreational drug use:   Cocaine:          denies use  Heroin:            denies use  Marijuana:       denies use  Other drugs:    denies use   Longest clean time: not applicable  History of Inpatient/Outpatient rehabilitation program: no  Smoking history: denies use  Use of caffeine: 3 caffeinated soft drinks per day     Family Psychiatric History:      Psychiatric Illness:       Mother - depression  Substance Abuse:       no family history of substance abuse  Suicide Attempts:        no family history of suicide attempts     Social History:     Education: PhD in statistical analysis of consumer behavior  Learning Disabilities: none  Marital History: single  Children: none  Living Arrangement: lives in home with mother  Occupational History: works part time as a teacher at [employer], on permanent disability  Functioning Relationships: limited support system  Legal History: none   History: None     Traumatic History:      Abuse: none  Other Traumatic Events: none      Past Medical History:     History of Seizures: yes  History of Head injury with loss of consciousness: no    Past Medical History:   Diagnosis Date    Anxiety     Back pain at L4-L5 level     Schrebier esophagus     Bulimia     Disease of thyroid gland     hypothyroid    GERD (gastroesophageal reflux disease)     Hyperlipidemia     Hypothyroidism     Leukopenia     Sciatica     Seizure ([address] )     Synovial cyst of lumbar spine     Vertigo     Weight gain      Past Surgical History:   Procedure Laterality Date    BONE MARROW BIOPSY      BREAST SURGERY      enlargement     RHINOPLASTY       Medications: All current active medications have been reviewed  Medications prior to admission:    Prior to Admission Medications   Prescriptions Last Dose Informant Patient Reported? Taking? LORazepam (ATIVAN) 2 mg tablet  Self Yes No   Sig: Take 2 mg by mouth every 6 (six) hours as needed for anxiety  LORazepam (ATIVAN) 2 mg tablet  Self Yes No   Sig: Take 1 tablet by mouth 4 (four) times a day   Methylprednisolone 4 MG TBPK  Self No No   Simg PO on day 1, then decrease by 4mg/day x 5 days per dose pack instructions  PARoxetine (PAXIL) 30 mg tablet  Self Yes No   Sig: Take 30 mg by mouth every morning  PARoxetine (PAXIL) 30 mg tablet  Self Yes No   Sig: Take 1 tablet by mouth 3 (three) times a day   QUEtiapine (SEROQUEL) 200 mg tablet  Self Yes No   Sig: Take 2 tablets by mouth daily at bedtime   QUEtiapine (SEROquel) 200 mg tablet  Self Yes No   Sig: Take 200 mg by mouth 2 (two) times a day       QUEtiapine (SEROquel) 400 MG tablet  Self Yes No   Sig: Take 400 mg by mouth daily at bedtime   alendronate (FOSAMAX) 70 mg tablet   No No   Sig: Take 1 tablet (70 mg total) by mouth every 7 days for 90 days   bisacodyl (DULCOLAX) 5 mg EC tablet  Self Yes No   Sig: Take by mouth   calcium carbonate-vitamin D (OSCAL-D) 500 mg-200 units per tablet   No No   Sig: TAKE 1 TABLET BY MOUTH 2 (TWO) TIMES A DAY WITH MEALS FOR 30 DAYS   diclofenac potassium (CATAFLAM) 50 mg tablet   No No   Sig: TAKE 1 TABLET BY MOUTH TWICE A DAY AS NEEDED   docusate sodium (COLACE) 100 mg capsule  Self Yes No   Sig: Take 1 capsule by mouth 2 (two) times a day   ferrous sulfate 325 (65 Fe) mg tablet   No No   Sig: Take 1 tablet (325 mg total) by mouth 2 (two) times a day for 90 days   fluticasone (FLONASE) 50 mcg/act nasal spray   No No   Si spray into each nostril daily for 14 days   levothyroxine 25 mcg tablet  Self Yes No   Sig: Take 25 mcg by mouth daily  levothyroxine 25 mcg tablet  Self Yes No   Sig: Take 1 tablet by mouth daily   levothyroxine 25 mcg tablet   No No   Sig: TAKE 1 TABLET EVERY DAY   omeprazole (PriLOSEC) 40 MG capsule  Self Yes No   Sig: Take 40 mg by mouth 2 (two) times a day  omeprazole (PriLOSEC) 40 MG capsule  Self Yes No   Sig: Take 80 mg by mouth daily at bedtime  omeprazole (PriLOSEC) 40 MG capsule  Self Yes No   Sig: Take 1 capsule by mouth 2 (two) times a day   omeprazole (PriLOSEC) 40 MG capsule   No No   Sig: TAKE 1 CAPSULE TWICE DAILY  triamcinolone (KENALOG) 0 1 % cream  Self Yes No   ziprasidone (GEODON) 80 mg capsule  Self Yes No   Sig: Take 80 mg by mouth daily     ziprasidone (GEODON) 80 mg capsule  Self Yes No   Sig: Take 2 capsules by mouth daily at bedtime        Facility-Administered Medications: None     Allergies: Allergies   Allergen Reactions    Latex     Risperdal [Risperidone]     Zyprexa [Olanzapine]      Objective     Vital signs in last 24 hours:    Temp:  [98 °F (36 7 °C)] 98 °F (36 7 °C)  HR:  [] 108  Resp:  [16] 16  BP: (121-145)/(77-88) 121/77    No intake or output data in the 24 hours ending 08/29/18 [address]:     [facility] was admitted to the inpatient psychiatric unit and started on Behavioral Health checks every 5 minutes  During the hospitalization she was encouraged to attend individual therapy, group therapy, milieu therapy and occupational therapy  Psychiatric medications were adjusted over the hospital stay  To address depressive symptoms, paranoid ideation and anxiety symptoms, [facility] was treated with antidepressant Paxil and antipsychotic medication Seroquel  Medication doses were gradually titrated during the hospital course  Paxil was started at the dose of 10 mg daily  Seroquel was restarted and titrated to 500 mg at bedtime   Prior to beginning of treatment medications risks and benefits and possible side effects including risk of parkinsonian symptoms, Tardive Dyskinesia and metabolic syndrome related to treatment with antipsychotic medications and risk of suicidality related to treatment with antidepressants were reviewed with Gogo Lewis  She verbalized understanding and agreement for treatment  While on the unit Gogo Lewis was seen by Neuropsychology Service for neuropsychological testing to assess cognitive function (no significant cognitive impairment was found)  Leonel symptoms slowly improved over the hospital course  Initially after admission she was still feeling depressed and anxious  With adjustment of medications and therapeutic milieu her symptoms gradually resolved  At the end of treatment Gogo Lewis was doing much better  Her mood was significantly improved at the time of discharge  She denied suicidal ideation, intent or plan at the time of discharge and denied homicidal ideation, intent or plan at the time of discharge  She was now remorseful about suicide attempt and had more hope for the future  Paranoid ideation was resolved  She was participating appropriately in milieu at the time of discharge  Sleep and appetite were improved  She was tolerating medications and was not reporting any significant side effects at the time of discharge  Since Gogo Lewis was doing well at the end of the hospitalization, treatment team felt that she could be safely discharged to outpatient care  We felt that Gogo Lewis achieved the maximum benefit of inpatient stay at that point and could now follow up with outpatient treatment  Prior to discharge  spoke with Andrews mother to address support and her readiness for discharge  Irene's mother felt that the Gogo Lewis was at baseline and was ready for discharge  Gogo Lewis also felt stable and ready for discharge at the end of the hospital stay      The outpatient follow up with psychiatrist Dr Oxana Peters at [facility] was arranged by the unit  upon discharge  Mental Status at Time of Discharge:     Appearance:  casually dressed   Behavior:  pleasant, cooperative, calm   Speech:  normal rate, normal volume, normal pitch   Mood:  improved, euthymic   Affect:  normal range and intensity, appropriate   Thought Process:  organized, goal directed   Associations: intact associations   Thought Content:  no overt delusions   Perceptual Disturbances: no auditory hallucinations, no visual hallucinations   Risk Potential: Suicidal ideation - None  Homicidal ideation - None  Potential for aggression - No   Sensorium:  oriented to person, place, time/date and situation   Memory:  recent and remote memory grossly intact   Consciousness:  alert and awake   Attention: attention span and concentration are age appropriate   Insight:  improved and fair   Judgment: improved and fair   Gait/Station: normal gait/station and normal balance   Motor Activity: no abnormal movements       Admission Diagnosis:    Principal Problem:    Schizoaffective disorder, depressive type ([place] [address] )  Active Problems:    ABIMAEL (generalized anxiety disorder)    Bulimia nervosa in remission    Discharge Diagnosis:     Principal Problem:    Schizoaffective disorder, depressive type ([place] [address] )  Active Problems:    ABIMAEL (generalized anxiety disorder)    Bulimia nervosa in remission  Resolved Problems:    * No resolved hospital problems  *    Lab Results: I have personally reviewed all pertinent laboratory/tests results      Most Recent Labs:   Lab Results   Component Value Date    WBC 3 85 (L) 08/21/2018    RBC 3 83 08/21/2018    HGB 11 3 (L) 08/21/2018    HCT 33 9 (L) 08/21/2018     08/21/2018    RDW 14 1 08/21/2018    NEUTROABS 2 49 08/21/2018     (L) 08/21/2018    K 3 7 08/21/2018    CL 94 (L) 08/21/2018    CO2 30 08/21/2018    BUN 8 08/21/2018    CREATININE 0 58 (L) 08/21/2018    GLUC 95 08/21/2018 GLUF 95 08/21/2018    CALCIUM 8 8 08/21/2018    AST 21 08/21/2018    ALT 30 08/21/2018    ALKPHOS 66 08/21/2018    PROT 7 0 06/01/2017    BILITOT 0 2 06/01/2017    CHOLESTEROL 205 (H) 08/21/2018    HDL 38 (L) 08/21/2018    TRIG 86 08/21/2018    LDLCALC 150 (H) 08/21/2018    NONHDLC 167 08/21/2018    HOR2ESFKHLOF 2 720 08/21/2018    FREET4 1 01 08/21/2018    T3FREE 2 47 08/21/2018   Drug Screen:   Lab Results   Component Value Date    AMPMETHUR Negative 08/13/2018    BARBTUR Negative 08/13/2018    BDZUR Negative 08/13/2018    THCUR Negative 08/13/2018    COCAINEUR Negative 08/13/2018    METHADONEUR Negative 08/13/2018    OPIATEUR Negative 08/13/2018    PCPUR Negative 08/13/2018       Discharge Medications:    See after visit summary for all reconciled discharge medications provided to patient and family  Discharge instructions/Information to patient and family:     See after visit summary for information provided to patient and family  Provisions for Follow-Up Care:    See after visit summary for information related to follow-up care and any pertinent home health orders  Discharge Statement:    I spent 38 minutes discharging the patient  This time was spent on the day of discharge  I had direct contact with the patient on the day of discharge  Additional documentation is required if more than 30 minutes were spent on discharge:    I reviewed with Kenneth Love importance of compliance with medications and outpatient treatment after discharge  I discussed the medication regimen and possible side effects of the medications with Kenneth Love prior to discharge  At the time of discharge she was tolerating psychiatric medications  I discussed outpatient follow up with Kenneth Love  I reviewed with Kenneth Love crisis plan and safety plan upon discharge  Kenneth Love was competent to understand risks and benefits of withholding information and risks and benefits of her actions      Ted Dan MD 08/29/18

## 2018-08-29 NOTE — CASE MANAGEMENT
ASIM called Larry Christiansen  Spoke to [place] re: dr's request that a wellness check be done on the pt, since pt was D/C today  SW explained reason for admission, & advised Antonio Caceres of pt's follow-up with Dr Magnus Vincent on 9/5 @ [id number] [facility]  Antonio Caceres said he'd call pt & go see pt today

## 2018-09-10 ENCOUNTER — HOSPITAL ENCOUNTER (EMERGENCY)
Facility: HOSPITAL | Age: 58
Discharge: HOME | End: 2018-09-10
Attending: EMERGENCY MEDICINE | Admitting: EMERGENCY MEDICINE
Payer: COMMERCIAL

## 2018-09-10 VITALS
BODY MASS INDEX: 31 KG/M2 | SYSTOLIC BLOOD PRESSURE: 117 MMHG | DIASTOLIC BLOOD PRESSURE: 63 MMHG | WEIGHT: 175 LBS | HEART RATE: 90 BPM | OXYGEN SATURATION: 99 % | TEMPERATURE: 98 F | RESPIRATION RATE: 20 BRPM

## 2018-09-10 DIAGNOSIS — F41.9 ANXIETY: Primary | ICD-10-CM

## 2018-09-10 PROCEDURE — 99283 EMERGENCY DEPT VISIT LOW MDM: CPT

## 2018-09-10 RX ORDER — ZIPRASIDONE HYDROCHLORIDE 80 MG/1
80 CAPSULE ORAL 3 TIMES DAILY
COMMUNITY
End: 2018-09-14 | Stop reason: SDUPTHER

## 2018-09-10 RX ORDER — LORAZEPAM 0.5 MG/1
2 TABLET ORAL ONCE
Status: COMPLETED | OUTPATIENT
Start: 2018-09-10 | End: 2018-09-10

## 2018-09-10 RX ORDER — MECLIZINE HYDROCHLORIDE 25 MG/1
25 TABLET ORAL ONCE
Status: COMPLETED | OUTPATIENT
Start: 2018-09-10 | End: 2018-09-10

## 2018-09-10 RX ORDER — LORAZEPAM 1 MG/1
2 TABLET ORAL 2 TIMES DAILY PRN
Qty: 8 TABLET | Refills: 0 | Status: SHIPPED | OUTPATIENT
Start: 2018-09-10 | End: 2018-09-10

## 2018-09-10 RX ORDER — BUSPIRONE HYDROCHLORIDE 10 MG/1
10 TABLET ORAL 3 TIMES DAILY
COMMUNITY
Start: 2018-09-05 | End: 2018-10-16 | Stop reason: ALTCHOICE

## 2018-09-10 RX ORDER — LORAZEPAM 2 MG/1
2 TABLET ORAL 2 TIMES DAILY PRN
Qty: 8 TABLET | Refills: 0 | Status: SHIPPED | OUTPATIENT
Start: 2018-09-10 | End: 2019-03-13 | Stop reason: SDUPTHER

## 2018-09-10 RX ADMIN — MECLIZINE HYDROCHLORIDE 25 MG: 25 TABLET ORAL at 15:11

## 2018-09-10 RX ADMIN — LORAZEPAM 2 MG: 0.5 TABLET ORAL at 15:11

## 2018-09-10 NOTE — SOCIAL WORK
CM consulted to assist with transportation for Pt and mother, Isael Booth, who is also a Pt  Pt does not have any family or friends who can transport and does not have any money  Taxi Voucher provided

## 2018-09-10 NOTE — ED PROVIDER NOTES
History  Chief Complaint   Patient presents with    Anxiety     pt states anxiety  Dr Tammy Russell recently d/c'd ativan and introduced buspar - pt is associating anxiety to this change of med     HPI   Patient is a 51-year-old woman with a history of anxiety, schizoaffective disorder vs depression who presents to the emergency department for anxiety  Patient's psychiatrist is Dr Safia Kirk  Patient states Dr Safia Kirk recently discontinued Ativan and introduced buspirone  She believes this medication change triggered worsening anxiety  Since that time she reports increased difficulty with concentration, a sensation that she is hyperventilating, and feelings of dread  She says it feels like her chronic anxiety, but significantly worse  She typically takes Seroquel, Geodon, and Paxil daily; however, has only been taking Seroquel because she believes that the Geodon and Paxil are also contributing to her increased anxiety even though she has been on these medications chronically  Patient was psychiatrically admitted in August for a suicide attempt on 08/20 after she attempted to end her life by overdosing on Geodon and Paxil  She states she recently attempted to schedule an appointment with her psychiatrist, but was told she would not be able to be seen for at least a few days  This prompted her to call EMS today because she was experiencing significant anxiety at home  She reports no other medical concerns  No suicidal or homicidal ideation  No auditory / visual hallucinations  She does report feeling mildly dizzy, which she frequently experiences along with her anxiety and tells me improves with meclizine  No fever, numbness/weakness, chest pain, dyspnea, abdominal pain  Prior to Admission Medications   Prescriptions Last Dose Informant Patient Reported? Taking?    PARoxetine (PAXIL) 10 mg tablet Unknown at Unknown time  No No   Sig: Take 1 tablet (10 mg total) by mouth daily for 60 days   QUEtiapine (SEROquel) 100 mg tablet Unknown at Unknown time  No No   Sig: Take 1 tablet (100 mg total) by mouth daily at bedtime for 60 days   QUEtiapine (SEROquel) 400 MG tablet Unknown at Unknown time  No No   Sig: Take 1 tablet (400 mg total) by mouth daily at bedtime for 60 days   busPIRone (BUSPAR) 10 mg tablet Unknown at Unknown time  Yes No   Sig: Take 10 mg by mouth   calcium carbonate-vitamin D (OSCAL-D) 500 mg-200 units per tablet Unknown at Unknown time  No No   Sig: TAKE 1 TABLET BY MOUTH 2 (TWO) TIMES A DAY WITH MEALS FOR 30 DAYS   docusate sodium (COLACE) 100 mg capsule Unknown at Unknown time Self Yes No   Sig: Take 1 capsule by mouth 2 (two) times a day   ferrous sulfate 325 (65 Fe) mg tablet Unknown at Unknown time  No No   Sig: Take 1 tablet (325 mg total) by mouth 2 (two) times a day for 90 days   levothyroxine 25 mcg tablet Unknown at Unknown time Self Yes No   Sig: Take 25 mcg by mouth daily  omeprazole (PriLOSEC) 40 MG capsule Unknown at Unknown time Self Yes No   Sig: Take 40 mg by mouth 2 (two) times a day       senna-docusate sodium (SENOKOT S) 8 6-50 mg per tablet Unknown at Unknown time  No No   Sig: Take 1 tablet by mouth daily at bedtime for 30 days   ziprasidone (GEODON) 80 mg capsule Unknown at Unknown time  Yes No   Sig: Take 80 mg by mouth 2 (two) times a day with meals      Facility-Administered Medications: None       Past Medical History:   Diagnosis Date    Anxiety     Back pain at L4-L5 level     Schreiber esophagus     Bulimia     Disease of thyroid gland     hypothyroid    GERD (gastroesophageal reflux disease)     Hyperlipidemia     Hypothyroidism     Leukopenia     Sciatica     Seizure (HCC)     Synovial cyst of lumbar spine     Vertigo     Weight gain        Past Surgical History:   Procedure Laterality Date    BONE MARROW BIOPSY      BREAST SURGERY      enlargement     RHINOPLASTY         Family History   Problem Relation Age of Onset    Uterine cancer Mother    [facility] Esophageal cancer Father     Colon cancer Maternal Grandmother      I have reviewed and agree with the history as documented  Social History   Substance Use Topics    Smoking status: Never Smoker    Smokeless tobacco: Never Used    Alcohol use No        Review of Systems   Constitutional: Negative for chills, diaphoresis, fatigue and fever  HENT: Negative for hearing loss, nosebleeds, sinus pain and sore throat  Eyes: Negative for photophobia, pain, redness and visual disturbance  Respiratory: Negative for cough, chest tightness, shortness of breath, wheezing and stridor  Cardiovascular: Negative for chest pain, palpitations and leg swelling  Gastrointestinal: Negative for abdominal distention, abdominal pain, constipation, diarrhea, nausea and vomiting  Genitourinary: Negative for dysuria, flank pain and hematuria  Musculoskeletal: Negative for back pain, neck pain and neck stiffness  Skin: Negative for pallor and rash  Allergic/Immunologic: Negative for immunocompromised state  Neurological: Negative for syncope, weakness, numbness and headaches  Hematological: Negative for adenopathy  Psychiatric/Behavioral: Positive for decreased concentration  Negative for agitation, confusion, hallucinations, self-injury and suicidal ideas  The patient is nervous/anxious  All other systems reviewed and are negative  Physical Exam  ED Triage Vitals [09/10/18 1420]   Temperature Pulse Respirations Blood Pressure SpO2   98 °F (36 7 °C) 90 20 117/63 99 %      Temp Source Heart Rate Source Patient Position - Orthostatic VS BP Location FiO2 (%)   Oral Monitor Sitting Right arm --      Pain Score       No Pain           Orthostatic Vital Signs  Vitals:    09/10/18 1420   BP: 117/63   Pulse: 90   Patient Position - Orthostatic VS: Sitting       Physical Exam   Constitutional: She is oriented to person, place, and time  She appears well-developed and well-nourished  No distress     HENT:   Head: Normocephalic and atraumatic  Right Ear: External ear normal    Left Ear: External ear normal    Nose: Nose normal    Mouth/Throat: Oropharynx is clear and moist    Eyes: Conjunctivae and EOM are normal  Pupils are equal, round, and reactive to light  No scleral icterus  Neck: Normal range of motion  Neck supple  No JVD present  No tracheal deviation present  Cardiovascular: Normal rate and regular rhythm  Exam reveals no gallop and no friction rub  No murmur heard  Pulmonary/Chest: Effort normal and breath sounds normal  No stridor  No respiratory distress  She has no wheezes  She has no rales  She exhibits no tenderness  Abdominal: Soft  She exhibits no distension  There is no tenderness  There is no rebound and no guarding  Musculoskeletal: Normal range of motion  She exhibits no edema or tenderness  Lymphadenopathy:     She has no cervical adenopathy  Neurological: She is alert and oriented to person, place, and time  No cranial nerve deficit or sensory deficit  She exhibits normal muscle tone  Skin: Skin is warm and dry  She is not diaphoretic  No erythema  No pallor  Psychiatric:   Patient appears anxious and frustrated  Speaks quickly  No SI/HI  Nursing note and vitals reviewed        ED Medications  Medications   LORazepam (ATIVAN) tablet 2 mg (2 mg Oral Given 9/10/18 1511)   meclizine (ANTIVERT) tablet 25 mg (25 mg Oral Given 9/10/18 1511)       Diagnostic Studies  Results Reviewed     None                 No orders to display         Procedures  Procedures      Phone Consults  ED Phone Contact    ED Course         MDM  Number of Diagnoses or Management Options  Anxiety: established and worsening     Amount and/or Complexity of Data Reviewed  Decide to obtain previous medical records or to obtain history from someone other than the patient: yes  Review and summarize past medical records: yes    Risk of Complications, Morbidity, and/or Mortality  Presenting problems: moderate  Diagnostic procedures: minimal  Management options: minimal       63-year-old woman presenting to the emergency department with anxiety  Patient tells me she has been abruptly transitioned from Ativan to buspirone for chronic control of her anxiety  She has no medical concerns and is not expressing any SI, HI or psychotic symptoms  I agreed to provide the patient with a single dose of Ativan and meclizine in the ED and prescribe a short course of p r n  Ativan until she is able to schedule an appointment to meet again with her psychiatrist   I explained to the patient that her psychiatrist will need to be the one prescribing her medications for chronic control of her anxiety  I also encouraged her to continue taking the Geodon, Paxil, and Buspar in addition to the Seroquel that she is currently prescribed  16:07 - On reassessment patient states she is feeling significantly better and is no longer feeling overwhelmed by anxiety  Dizziness resolved  Prescription given for short course of Ativan  I have instructed her to call her psychiatrist today to schedule an appointment as soon as possible for visit and also encouraged her to follow up with her PCP  Return to the emergency department with any suicidal/homicidal ideation, panic attacks, visual/auditory hallucinations  CritCare Time    Disposition  Final diagnoses:   Anxiety     Time reflects when diagnosis was documented in both MDM as applicable and the Disposition within this note     Time User Action Codes Description Comment    9/10/2018  3:33 PM Kassie Olmansharla Add [F41 9] Anxiety       ED Disposition     ED Disposition Condition Comment    Discharge  [address] discharge to home/self care      Condition at discharge: Good        Follow-up Information     Follow up With Specialties Details Why Contact Info    Tiffany العراقي MD Internal Medicine, Bariatrics Schedule an appointment as soon as possible for a visit  [phone] [facility]  [phone]            Discharge Medication List as of 9/10/2018  3:57 PM      START taking these medications    Details   LORazepam (ATIVAN) 2 mg tablet Take 1 tablet (2 mg total) by mouth 2 (two) times a day as needed for anxiety for up to 4 days, Starting Mon 9/10/2018, Until Fri 9/14/2018, Print         CONTINUE these medications which have NOT CHANGED    Details   busPIRone (BUSPAR) 10 mg tablet Take 10 mg by mouth, Starting Wed 9/5/2018, Until Thu 9/5/2019, Historical Med      calcium carbonate-vitamin D (OSCAL-D) 500 mg-200 units per tablet TAKE 1 TABLET BY MOUTH 2 (TWO) TIMES A DAY WITH MEALS FOR 30 DAYS, Starting Fri 8/24/2018, Until Sun 9/23/2018, Normal      docusate sodium (COLACE) 100 mg capsule Take 1 capsule by mouth 2 (two) times a day, Starting Thu 6/8/2017, Historical Med      ferrous sulfate 325 (65 Fe) mg tablet Take 1 tablet (325 mg total) by mouth 2 (two) times a day for 90 days, Starting Mon 6/25/2018, Until Sun 9/23/2018, Normal      levothyroxine 25 mcg tablet Take 25 mcg by mouth daily  , Historical Med      omeprazole (PriLOSEC) 40 MG capsule Take 40 mg by mouth 2 (two) times a day   , Historical Med      PARoxetine (PAXIL) 10 mg tablet Take 1 tablet (10 mg total) by mouth daily for 60 days, Starting Thu 8/30/2018, Until Mon 10/29/2018, Print      !! QUEtiapine (SEROquel) 100 mg tablet Take 1 tablet (100 mg total) by mouth daily at bedtime for 60 days, Starting Wed 8/29/2018, Until Sun 10/28/2018, Print      !! QUEtiapine (SEROquel) 400 MG tablet Take 1 tablet (400 mg total) by mouth daily at bedtime for 60 days, Starting Wed 8/29/2018, Until Sun 10/28/2018, Print      senna-docusate sodium (SENOKOT S) 8 6-50 mg per tablet Take 1 tablet by mouth daily at bedtime for 30 days, Starting Wed 8/29/2018, Until Fri 9/28/2018, Print      ziprasidone (GEODON) 80 mg capsule Take 80 mg by mouth 2 (two) times a day with meals, Historical Med       !! - Potential duplicate medications found  Please discuss with provider  No discharge procedures on file  ED Provider  Attending physically available and evaluated Aleah Espinoza I managed the patient along with the ED Attending      Electronically Signed by         Arabella Richard MD  09/10/18 8679

## 2018-09-10 NOTE — ED ATTENDING ATTESTATION
Lisseth Joy MD, saw and evaluated the patient  I have discussed the patient with the resident/non-physician practitioner and agree with the resident's/non-physician practitioner's findings, Plan of Care, and MDM as documented in the resident's/non-physician practitioner's note, except where noted  All available labs and Radiology studies were reviewed  At this point I agree with the current assessment done in the Emergency Department  I have conducted an independent evaluation of this patient a history and physical is as follows: Pt presents with worsening anxiety Pt had recent admission to hospital after OD  Pt was discharged 3 weeks ago Pt was switched off ativan and started on buspar now increased anxiety  Pt stopped ativan 3 days ago  Pt did not take all of her meds today  Pt states she called psychiatrist and PMD and they would not see her   Pt states that she has taken benzos daily for 38 years even though they are written as a prn Today she had a panic attack and felt like she would have a siezure PE: alert heart reg lungs clear neck and spine nontender abrasion to head abd soft nontender neuro nonfocal  MDM: will give  Ativan for a few doses she is to talk to pmd and psyche about benzo wean    Critical Care Time  CritCare Time    Procedures

## 2018-09-10 NOTE — DISCHARGE INSTRUCTIONS
Please follow-up with your psychiatrist Dr Mccoy Ny  Call today to schedule an appointment for as soon as possible  Anxiety   WHAT YOU NEED TO KNOW:   Anxiety is a condition that causes you to feel extremely worried or nervous  The feelings are so strong that they can cause problems with your daily activities or sleep  Anxiety may be triggered by something you fear, or it may happen without a cause  Family or work stress, smoking, caffeine, and alcohol can increase your risk for anxiety  Certain medicines or health conditions can also increase your risk  Anxiety can become a long-term condition if it is not managed or treated  DISCHARGE INSTRUCTIONS:   Call 911 if:   · You have chest pain, tightness, or heaviness that may spread to your shoulders, arms, jaw, neck, or back  · You feel like hurting yourself or someone else  Contact your healthcare provider if:   · Your symptoms get worse or do not get better with treatment  · Your anxiety keeps you from doing your regular daily activities  · You have new symptoms since your last visit  · You have questions or concerns about your condition or care  Medicines:   · Medicines  may be given to help you feel more calm and relaxed, and decrease your symptoms  · Take your medicine as directed  Contact your healthcare provider if you think your medicine is not helping or if you have side effects  Tell him of her if you are allergic to any medicine  Keep a list of the medicines, vitamins, and herbs you take  Include the amounts, and when and why you take them  Bring the list or the pill bottles to follow-up visits  Carry your medicine list with you in case of an emergency  Follow up with your healthcare provider within 2 weeks or as directed:  Write down your questions so you remember to ask them during your visits  Manage anxiety:   · Talk to someone about your anxiety  Your healthcare provider may suggest counseling   Cognitive behavioral therapy can help you understand and change how you react to events that trigger your symptoms  You might feel more comfortable talking with a friend or family member about your anxiety  Choose someone you know will be supportive and encouraging  · Find ways to relax  Activities such as exercise, meditation, or listening to music can help you relax  Spend time with friends, or do things you enjoy  · Practice deep breathing  Deep breathing can help you relax when you feel anxious  Focus on taking slow, deep breaths several times a day, or during an anxiety attack  Breathe in through your nose and out through your mouth  · Create a regular sleep routine  Regular sleep can help you feel calmer during the day  Go to sleep and wake up at the same times every day  Do not watch television or use the computer right before bed  Your room should be comfortable, dark, and quiet  · Eat a variety of healthy foods  Healthy foods include fruits, vegetables, low-fat dairy products, lean meats, fish, whole-grain breads, and cooked beans  Healthy foods can help you feel less anxious and have more energy  · Exercise regularly  Exercise can increase your energy level  Exercise may also lift your mood and help you sleep better  Your healthcare provider can help you create an exercise plan  · Do not smoke  Nicotine and other chemicals in cigarettes and cigars can increase anxiety  Ask your healthcare provider for information if you currently smoke and need help to quit  E-cigarettes or smokeless tobacco still contain nicotine  Talk to your healthcare provider before you use these products  · Do not have caffeine  Caffeine can make your symptoms worse  Do not have foods or drinks that are meant to increase your energy level  · Limit or do not drink alcohol  Ask your healthcare provider if alcohol is safe for you  You may not be able to drink alcohol if you take certain anxiety or depression medicines   Limit alcohol to 1 drink per day if you are a woman  Limit alcohol to 2 drinks per day if you are a man  A drink of alcohol is 12 ounces of beer, 5 ounces of wine, or 1½ ounces of liquor  · Do not use drugs  Drugs can make your anxiety worse  It can also make anxiety hard to manage  Talk to your healthcare provider if you use drugs and want help to quit  © 2017 [facility] Information is for End User's use only and may not be sold, redistributed or otherwise used for commercial purposes  All illustrations and images included in CareNotes® are the copyrighted property of [employer] D [employer] M [employer]  or Stef Orellana  The above information is an  only  It is not intended as medical advice for individual conditions or treatments  Talk to your doctor, nurse or pharmacist before following any medical regimen to see if it is safe and effective for you

## 2018-09-14 ENCOUNTER — OFFICE VISIT (OUTPATIENT)
Dept: FAMILY MEDICINE CLINIC | Facility: CLINIC | Age: 58
End: 2018-09-14
Payer: COMMERCIAL

## 2018-09-14 ENCOUNTER — PATIENT OUTREACH (OUTPATIENT)
Dept: FAMILY MEDICINE CLINIC | Facility: CLINIC | Age: 58
End: 2018-09-14

## 2018-09-14 VITALS
SYSTOLIC BLOOD PRESSURE: 130 MMHG | RESPIRATION RATE: 14 BRPM | HEART RATE: 80 BPM | TEMPERATURE: 97.8 F | BODY MASS INDEX: 27.89 KG/M2 | WEIGHT: 157.4 LBS | DIASTOLIC BLOOD PRESSURE: 80 MMHG | HEIGHT: 63 IN

## 2018-09-14 DIAGNOSIS — F25.1 SCHIZOAFFECTIVE DISORDER, DEPRESSIVE TYPE (HCC): Chronic | ICD-10-CM

## 2018-09-14 DIAGNOSIS — F41.1 GAD (GENERALIZED ANXIETY DISORDER): Primary | Chronic | ICD-10-CM

## 2018-09-14 PROCEDURE — 99213 OFFICE O/P EST LOW 20 MIN: CPT | Performed by: FAMILY MEDICINE

## 2018-09-14 PROCEDURE — 3008F BODY MASS INDEX DOCD: CPT | Performed by: FAMILY MEDICINE

## 2018-09-14 RX ORDER — LORAZEPAM 1 MG/1
1 TABLET ORAL EVERY 8 HOURS
COMMUNITY
Start: 2018-09-10 | End: 2018-10-16 | Stop reason: DRUGHIGH

## 2018-09-14 RX ORDER — ZIPRASIDONE HYDROCHLORIDE 80 MG/1
80 CAPSULE ORAL 3 TIMES DAILY
Qty: 90 CAPSULE | Refills: 0 | Status: SHIPPED | OUTPATIENT
Start: 2018-09-14 | End: 2018-10-23

## 2018-09-14 RX ORDER — QUETIAPINE FUMARATE 400 MG/1
400 TABLET, FILM COATED ORAL
Qty: 30 TABLET | Refills: 0 | Status: SHIPPED | OUTPATIENT
Start: 2018-09-14 | End: 2019-05-22 | Stop reason: SDUPTHER

## 2018-09-14 RX ORDER — PAROXETINE 10 MG/1
30 TABLET, FILM COATED ORAL DAILY
Qty: 90 TABLET | Refills: 0 | Status: SHIPPED | OUTPATIENT
Start: 2018-09-14 | End: 2018-10-16 | Stop reason: DRUGHIGH

## 2018-09-14 NOTE — PROGRESS NOTES
Received request to contact patient regarding outpatient counseling options which are in network with her insurance  Patient confirms that she is interested in counseling  She reports that she is feeling well today  She resides with her mother and describes this as a supportive environment for both of them  Patient reports that she had been involved with mental health counseling in the past and then her counselor moved out of the area  Reviewed outpatient counseling options with patient over the phone and agency contact information provided  Patient reports she will call to schedule an appointment and will contact SW for further assistance as needed  Supportive counseling provided  Patient denies further needs at this time  Will continue to be available to provide support

## 2018-09-14 NOTE — PROGRESS NOTES
Assessment/Plan:    ABIMAEL (generalized anxiety disorder)  Pt is currently asymptomatic  Denies any chest pain, palpitations, SOB, anxiety, agitation, headache, dizziness, etc  Pt does not plan Dr Chloe Arana again,  Patient has scheduled an appointment with a different Psychiatrist in middle of October  Pt would like psychiatric medication refills to bridge her during this time  Of note: pt has history of OD on psychiatric medication  Patient is currently taking paroxetine HCl 30mg daily, Ziprasidone HCl 80mg TID, Lorazepam 2mg Q8hr PRN (pt reports 1-2 times a day), and Quetiapine Fumarate 400mg HS  Pt states she has been taking these medications everyday     -following check with PDMP, it appears pt has refilled Lorazepam 1mg tablet 90 0 tablets on 9/11/18  This should be enough to last patient until middle of October so I will not be prescribing Lorazepam today    -I will order 1 month worth of other psych meds today   -pt was advised to call someone or go to the ER if pt starts to have thought about harming herself again    -follow up with PCP after establishing care with psych    [facility], [facility] )  Psych medication refilled for 1 month   -f/u with psych   -see ABIMAEL for details          Subjective:      Patient ID: Viji Alegria is a 62 y o  female  HPI  Patient is here for follow-up regarding anxiety  Of note, pt has a history of attempted OD with Geodon and Paxil resulting in Serotonin syndrome requiring hospitalization and following discharge, was admitted 201 to the psych unit  She had episode of anxiety requiring ER visit on September 10th  Patient attributes this to change in her medication  Her psychiatrist was Dr Mary Cho who discontinued Ativan and started pt on Buspirone according to ER note on September 10, 2018  patient states she is in between psychiatrist,  Next appointment is  Scheduled for the middle of October    Patient would like refill medication to bridge her over the next month  Today patient is feeding well,  States she is not anxious  Does not have any  Palpitations,  Nausea vomiting, chest pain, headache  Patient states she has stopped taking the buspirone  Patient has only be taking Buspirone for2-3 days  Patient states she has side effects such as nausea, decreased appetite,  Dizziness  Patient denies any hallucination, agitation, anxiety, suicidal homicidal ideation  Patient does not have medication on her today  Patient would like refill on Paroxetine [id number], Ziprasidone HCl 80mg, Lorazepam 2mg, Quetiapine Fumarate 400mg  Patient states her last refill of this medication were about 2  months ago, not counting to the ER visit  Patient states she usually takes lorazepam 1 or 2 times a day  Review of Systems   Constitutional: Negative for activity change, appetite change, chills, fatigue, fever and unexpected weight change  HENT: Negative for rhinorrhea, sore throat, tinnitus and trouble swallowing  Eyes: Negative for visual disturbance  Respiratory: Negative for cough and shortness of breath  Cardiovascular: Negative for chest pain, palpitations and leg swelling  Gastrointestinal: Negative for abdominal pain, blood in stool, constipation, diarrhea, nausea and vomiting  Genitourinary: Negative for dysuria, frequency, hematuria and urgency  Musculoskeletal: Positive for back pain and gait problem  Some  Limitation in ambulation due to back pain, patient stayed most of the time she is fine   Skin: Negative for rash and wound  Neurological: Positive for dizziness  Negative for seizures, syncope, speech difficulty, weakness, light-headedness, numbness and headaches  Dizziness during anxiety attack back on Monday   Psychiatric/Behavioral: Negative for agitation, decreased concentration, hallucinations, self-injury, sleep disturbance and suicidal ideas  The patient is not nervous/anxious and is not hyperactive  Objective:    /80 (BP Location: Left arm, Patient Position: Sitting, Cuff Size: Standard)   Pulse 80   Temp 97 8 °F (36 6 °C) (Tympanic)   Resp 14   Ht 5' 2 9" (1 598 m)   Wt 71 4 kg (157 lb 6 4 oz)   BMI 27 97 kg/m²      Physical Exam   Constitutional: She is oriented to person, place, and time  She appears well-developed and well-nourished  No distress  Patient is acting appropriate, no sign of agitation, no signs of flat affect  Facial tics, "lip sucking motion"    HENT:   Head: Normocephalic and atraumatic  Nose: Nose normal    Eyes: EOM are normal  Pupils are equal, round, and reactive to light  Neck: Normal range of motion  Cardiovascular: Normal rate, regular rhythm, normal heart sounds and intact distal pulses  Exam reveals no gallop and no friction rub  No murmur heard  Pulmonary/Chest: Effort normal and breath sounds normal  No respiratory distress  She has no wheezes  She has no rales  She exhibits no tenderness  Abdominal: Soft  Bowel sounds are normal  She exhibits no distension  There is no tenderness  There is no rebound and no guarding  Musculoskeletal: She exhibits no edema, tenderness or deformity  Neurological: She is alert and oriented to person, place, and time  No cranial nerve deficit  Skin: Skin is warm and dry  No rash noted  She is not diaphoretic  No erythema  No pallor  Psychiatric: She has a normal mood and affect  Vitals reviewed  IVAN Jansen    Family Medicine, PGY-1  09/14/18  9:40 AM

## 2018-09-14 NOTE — ASSESSMENT & PLAN NOTE
Pt is currently asymptomatic  Denies any chest pain, palpitations, SOB, anxiety, agitation, headache, dizziness, etc  Pt does not plan Dr Albert Holder again,  Patient has scheduled an appointment with a different Psychiatrist in middle of October  Pt would like psychiatric medication refills to bridge her during this time  Of note: pt has history of OD on psychiatric medication  Patient is currently taking paroxetine HCl 30mg daily, Ziprasidone HCl 80mg TID, Lorazepam 2mg Q8hr PRN (pt reports 1-2 times a day), and Quetiapine Fumarate 400mg HS  Pt states she has been taking these medications everyday     -following check with PDMP, it appears pt has refilled Lorazepam 1mg tablet 90 0 tablets on 9/11/18   This should be enough to last patient until middle of October so I will not be prescribing Lorazepam today    -I will order 1 month worth of other psych meds today   -pt was advised to call someone or go to the ER if pt starts to have thought about harming herself again    -follow up with PCP after establishing care with psych

## 2018-09-18 ENCOUNTER — TELEPHONE (OUTPATIENT)
Dept: RADIOLOGY | Facility: CLINIC | Age: 58
End: 2018-09-18

## 2018-09-18 ENCOUNTER — HOSPITAL ENCOUNTER (OUTPATIENT)
Dept: RADIOLOGY | Facility: CLINIC | Age: 58
Discharge: HOME | End: 2018-09-18
Attending: ANESTHESIOLOGY | Admitting: ANESTHESIOLOGY
Payer: COMMERCIAL

## 2018-09-18 VITALS
DIASTOLIC BLOOD PRESSURE: 81 MMHG | OXYGEN SATURATION: 95 % | HEART RATE: 103 BPM | TEMPERATURE: 97.8 F | SYSTOLIC BLOOD PRESSURE: 122 MMHG | RESPIRATION RATE: 18 BRPM

## 2018-09-18 DIAGNOSIS — M47.816 LUMBAR SPONDYLOSIS: ICD-10-CM

## 2018-09-18 PROCEDURE — 64635 DESTROY LUMB/SAC FACET JNT: CPT | Performed by: ANESTHESIOLOGY

## 2018-09-18 PROCEDURE — 64636 DESTROY L/S FACET JNT ADDL: CPT | Performed by: ANESTHESIOLOGY

## 2018-09-18 RX ORDER — LIDOCAINE HYDROCHLORIDE 10 MG/ML
10 INJECTION, SOLUTION EPIDURAL; INFILTRATION; INTRACAUDAL; PERINEURAL ONCE
Status: COMPLETED | OUTPATIENT
Start: 2018-09-18 | End: 2018-09-18

## 2018-09-18 RX ORDER — BUPIVACAINE HYDROCHLORIDE 5 MG/ML
30 INJECTION, SOLUTION EPIDURAL; INTRACAUDAL ONCE
Status: COMPLETED | OUTPATIENT
Start: 2018-09-18 | End: 2018-09-18

## 2018-09-18 RX ADMIN — LIDOCAINE HYDROCHLORIDE 7 ML: 10 INJECTION, SOLUTION EPIDURAL; INFILTRATION; INTRACAUDAL; PERINEURAL at 09:00

## 2018-09-18 RX ADMIN — LIDOCAINE HYDROCHLORIDE 3 ML: 20 INJECTION, SOLUTION EPIDURAL; INFILTRATION; INTRACAUDAL; PERINEURAL at 09:05

## 2018-09-18 RX ADMIN — BUPIVACAINE HYDROCHLORIDE 3 ML: 5 INJECTION, SOLUTION EPIDURAL; INTRACAUDAL at 09:08

## 2018-09-18 NOTE — DISCHARGE INSTRUCTIONS

## 2018-09-18 NOTE — H&P
History of Present Illness: The patient is a 62 y o  female who presents with complaints of low back pain  Patient Active Problem List   Diagnosis    Hyponatremia    Lumbar radiculopathy    DDD (degenerative disc disease), lumbar    Spondylolisthesis at L4-L5 level    Localized osteoporosis without current pathological fracture    Cough    Spinal stenosis of lumbar region with neurogenic claudication    Lumbar spondylosis    T12 compression fracture (HCC)    Synovial cyst of lumbar facet joint    Anxiety    Bulimia nervosa in remission    Constipation    Esophageal reflux    Hyperlipidemia    Hypothyroidism    Other fatigue    Serotonin syndrome    Leukopenia    Schizoaffective disorder, depressive type ([facility] )    ABIMAEL (generalized anxiety disorder)       Past Medical History:   Diagnosis Date    Anxiety     Back pain at L4-L5 level     Schreiber esophagus     Bulimia     Disease of thyroid gland     hypothyroid    GERD (gastroesophageal reflux disease)     Hyperlipidemia     Hypothyroidism     Leukopenia     Sciatica     Seizure (HCC)     Synovial cyst of lumbar spine     Vertigo     Weight gain        Past Surgical History:   Procedure Laterality Date    BONE MARROW BIOPSY      BREAST SURGERY      enlargement     RHINOPLASTY           Current Outpatient Prescriptions:     busPIRone (BUSPAR) 10 mg tablet, Take 10 mg by mouth, Disp: , Rfl:     calcium carbonate-vitamin D (OSCAL-D) 500 mg-200 units per tablet, TAKE 1 TABLET BY MOUTH 2 (TWO) TIMES A DAY WITH MEALS FOR 30 DAYS, Disp: 60 tablet, Rfl: 0    docusate sodium (COLACE) 100 mg capsule, Take 1 capsule by mouth 2 (two) times a day, Disp: , Rfl:     ferrous sulfate 325 (65 Fe) mg tablet, Take 1 tablet (325 mg total) by mouth 2 (two) times a day for 90 days, Disp: 180 tablet, Rfl: 0    levothyroxine 25 mcg tablet, Take 25 mcg by mouth daily  , Disp: , Rfl:     LORazepam (ATIVAN) 1 mg tablet, Take 1 mg by mouth every 8 (eight) hours, Disp: , Rfl:     LORazepam (ATIVAN) 2 mg tablet, Take 1 tablet (2 mg total) by mouth 2 (two) times a day as needed for anxiety for up to 4 days, Disp: 8 tablet, Rfl: 0    omeprazole (PriLOSEC) 40 MG capsule, Take 40 mg by mouth 2 (two) times a day   , Disp: , Rfl:     PARoxetine (PAXIL) 10 mg tablet, Take 3 tablets (30 mg total) by mouth daily for 30 days Takes a tab 3 times a day, Disp: 90 tablet, Rfl: 0    QUEtiapine (SEROquel) 100 mg tablet, Take 1 tablet (100 mg total) by mouth daily at bedtime for 60 days (Patient not taking: Reported on 9/14/2018 ), Disp: 30 tablet, Rfl: 1    QUEtiapine (SEROquel) 400 MG tablet, Take 1 tablet (400 mg total) by mouth daily at bedtime for 30 days, Disp: 30 tablet, Rfl: 0    senna-docusate sodium (SENOKOT S) 8 6-50 mg per tablet, Take 1 tablet by mouth daily at bedtime for 30 days, Disp: 30 tablet, Rfl: 0    ziprasidone (GEODON) 80 mg capsule, Take 1 capsule (80 mg total) by mouth 3 (three) times a day for 30 days, Disp: 90 capsule, Rfl: 0    Current Facility-Administered Medications:     bupivacaine (PF) (MARCAINE) 0 5 % injection 30 mL, 30 mL, Injection, Once, Tigre Zimmerman, DO    lidocaine (PF) (XYLOCAINE-MPF) 1 % injection 10 mL, 10 mL, Intra-articular, Once, Tigre Jonest, DO    lidocaine (PF) (XYLOCAINE-MPF) 2 % injection 4 mL, 4 mL, Intra-articular, Once, Tigre Jonest, DO    Allergies   Allergen Reactions    Latex     Risperdal [Risperidone]     Zyprexa [Olanzapine]        Physical Exam:   Vitals:    09/18/18 0843   BP: 107/72   Pulse: (!) 108   Resp: 22   Temp: 97 8 °F (36 6 °C)   SpO2: 95%     General: Awake, Alert, Oriented x 3, Mood and affect appropriate  Respiratory: Respirations even and unlabored  Cardiovascular: Peripheral pulses intact; no edema  Musculoskeletal Exam:   Bilateral lumbar paraspinals tender to palpation      ASA Score: 2    Patient/Chart Verification  Patient ID Verified: Verbal  ID Band Applied: No  Consents Confirmed: Procedural  H&P( within 30 days) Verified: To be obtained in the Pre-Procedure area  Interval H&P(within 24 hr) Complete (required for Outpatients and Surgery Admit only): To be obtained in the Pre-Procedure area  Allergies Reviewed: Yes  Anticoag/NSAID held?: No (denies)  Currently on antibiotics?: No  Pre-op Lab/Test Results Available: N/A    Assessment:   1   Lumbar spondylosis        Plan: RT L3, L4, L5 RFA

## 2018-09-19 NOTE — TELEPHONE ENCOUNTER
S/w the patient and she stated she is a little sore today  She forgot to take the bandaid's off but will do so  Encouraged her to alternate ice vs heat to decrease discomfort  She verbalized understanding after reviewing the postop instructions and she looks forward to having the other side completed

## 2018-09-22 DIAGNOSIS — D50.9 IRON DEFICIENCY ANEMIA, UNSPECIFIED IRON DEFICIENCY ANEMIA TYPE: ICD-10-CM

## 2018-09-25 RX ORDER — FERROUS SULFATE 325(65) MG
1 TABLET ORAL 2 TIMES DAILY
Qty: 180 TABLET | Refills: 0 | Status: SHIPPED | OUTPATIENT
Start: 2018-09-25 | End: 2020-01-14 | Stop reason: HOSPADM

## 2018-10-01 DIAGNOSIS — M81.6 LOCALIZED OSTEOPOROSIS WITHOUT CURRENT PATHOLOGICAL FRACTURE: ICD-10-CM

## 2018-10-01 RX ORDER — B-COMPLEX WITH VITAMIN C
1 TABLET ORAL 2 TIMES DAILY WITH MEALS
Qty: 60 TABLET | Refills: 0 | Status: SHIPPED | OUTPATIENT
Start: 2018-10-01 | End: 2019-11-14

## 2018-10-04 ENCOUNTER — OFFICE VISIT (OUTPATIENT)
Dept: FAMILY MEDICINE CLINIC | Facility: CLINIC | Age: 58
End: 2018-10-04
Payer: COMMERCIAL

## 2018-10-04 DIAGNOSIS — F25.1 SCHIZOAFFECTIVE DISORDER, DEPRESSIVE TYPE (HCC): Primary | Chronic | ICD-10-CM

## 2018-10-04 DIAGNOSIS — F41.1 GAD (GENERALIZED ANXIETY DISORDER): Chronic | ICD-10-CM

## 2018-10-04 PROCEDURE — 99213 OFFICE O/P EST LOW 20 MIN: CPT | Performed by: FAMILY MEDICINE

## 2018-10-04 RX ORDER — HYDROXYZINE HYDROCHLORIDE 25 MG/1
25 TABLET, FILM COATED ORAL EVERY 6 HOURS
COMMUNITY
Start: 2018-10-04 | End: 2018-10-16 | Stop reason: ALTCHOICE

## 2018-10-04 RX ORDER — DOXEPIN HYDROCHLORIDE 25 MG/1
CAPSULE ORAL
COMMUNITY
Start: 2018-10-03 | End: 2018-10-16 | Stop reason: ALTCHOICE

## 2018-10-04 NOTE — PROGRESS NOTES
Anu Shook 1960 female MRN: [id number]    Family Medicine Acute Visit    ASSESSMENT/PLAN   Problem List Items Addressed This Visit     Schizoaffective disorder, depressive type ([address] ) - Primary (Chronic)    Relevant Medications    doxepin (SINEquan) 25 mg capsule    hydrOXYzine HCL (ATARAX) 25 mg tablet    Other Relevant Orders    Ambulatory referral to Psychiatry    ABIMAEL (generalized anxiety disorder) (Chronic)    Relevant Medications    doxepin (SINEquan) 25 mg capsule    hydrOXYzine HCL (ATARAX) 25 mg tablet    Other Relevant Orders    Ambulatory referral to Psychiatry        Per PDMP Review:   9/10 Ativan 8 tabs (ED)  9/11 Ativan 90 tabs (Psychiatry)   10/2 Ativan 5 tabs (ED)     Discussed with pt that, per review of her filling on PDMP, she should have a supply of Lorazepam that would last her until her next psychiatry appointment  As she should have a supply at home and she is receiving this medication from another provider, I can not provide her a script for Lorazepam  Encouraged pt to keep her current appointment with Psychiatry, as it will take time for her to be scheduled into the [place] [id number] group  Discussed that if she feels uncomfortable with her current Psychiatric provider, she should speak with the  to see if there is someone else she can work with until she is able to change to another provider group  Encouraged pt to call 911 if her anxiety symptoms worsen to the point of SI/HI or she feels unsafe at home  Future Appointments  Date Time Provider Zonia Valenzuela   10/16/2018 8:00 AM [facility]          SUBJECTIVE  CC: Anxiety      HPI:  Anu Shook is a 62 y o  female who presents for an acute visit  Of note pt was admitted to [facility] in 8/2018 due to serotonin syndrome in setting of medication overdose  She was initially in the ICU, then transferred to the medical floor, and ultimately to an inpatient psychiatric unit       Since discharge, her Psychiatrist stopped Ativan and switched her to Buspar  On 9/10, she had a panic attack which lasted "7 hours" for which she went to the ED, where they gave her a small script of Ativan  Ultimately, her Psychiatrist gave her Ativan (at 1 mg TID instead of her previous 2 mg TID) as well as Doxepin  She states this didn't work and that her psychiatrist "Doesn't believe me" when she told him this  She continues to have panic attacks, during which she has hyperventilation, dizzy, trembling, confusing  She had her first appointment with a therapist this week, which she felt was helpful  She would like a referral to a new Psychiatrist as well as possibly a refill of Lorazepam  She is scheduled to follow up with Psychiatry on 10/11  Review of Systems   Psychiatric/Behavioral: Negative for self-injury and suicidal ideas  The patient is nervous/anxious          Historical Information   The patient history was reviewed as follows:  Past Medical History:   Diagnosis Date    Anxiety     Back pain at L4-L5 level     Schreiber esophagus     Bulimia     Disease of thyroid gland     hypothyroid    GERD (gastroesophageal reflux disease)     Hyperlipidemia     Hypothyroidism     Leukopenia     Sciatica     Seizure (HCC)     Synovial cyst of lumbar spine     Vertigo     Weight gain          Past Surgical History:   Procedure Laterality Date    BONE MARROW BIOPSY      BREAST SURGERY      enlargement     RHINOPLASTY       Family History   Problem Relation Age of Onset    Uterine cancer Mother     Esophageal cancer Father     Colon cancer Maternal Grandmother       Social History   History   Alcohol Use No     History   Drug Use No     History   Smoking Status    Never Smoker   Smokeless Tobacco    Never Used       Medications:     Current Outpatient Prescriptions:     doxepin (SINEquan) 25 mg capsule, One three times a day, Disp: , Rfl:     hydrOXYzine HCL (ATARAX) 25 mg tablet, Take 25 mg by mouth every 6 (six) hours, Disp: , Rfl:     busPIRone (BUSPAR) 10 mg tablet, Take 10 mg by mouth, Disp: , Rfl:     calcium carbonate-vitamin D (OSCAL-D) 500 mg-200 units per tablet, TAKE 1 TABLET BY MOUTH 2 (TWO) TIMES A DAY WITH MEALS FOR 30 DAYS, Disp: 60 tablet, Rfl: 0    docusate sodium (COLACE) 100 mg capsule, Take 1 capsule by mouth 2 (two) times a day, Disp: , Rfl:     ferrous sulfate 325 (65 Fe) mg tablet, TAKE 1 TABLET (325 MG TOTAL) BY MOUTH 2 (TWO) TIMES A DAY FOR 90 DAYS, Disp: 180 tablet, Rfl: 0    levothyroxine 25 mcg tablet, Take 25 mcg by mouth daily  , Disp: , Rfl:     LORazepam (ATIVAN) 1 mg tablet, Take 1 mg by mouth every 8 (eight) hours, Disp: , Rfl:     LORazepam (ATIVAN) 2 mg tablet, Take 1 tablet (2 mg total) by mouth 2 (two) times a day as needed for anxiety for up to 4 days, Disp: 8 tablet, Rfl: 0    omeprazole (PriLOSEC) 40 MG capsule, Take 40 mg by mouth 2 (two) times a day   , Disp: , Rfl:     PARoxetine (PAXIL) 10 mg tablet, Take 3 tablets (30 mg total) by mouth daily for 30 days Takes a tab 3 times a day, Disp: 90 tablet, Rfl: 0    QUEtiapine (SEROquel) 100 mg tablet, Take 1 tablet (100 mg total) by mouth daily at bedtime for 60 days (Patient not taking: Reported on 9/14/2018 ), Disp: 30 tablet, Rfl: 1    QUEtiapine (SEROquel) 400 MG tablet, Take 1 tablet (400 mg total) by mouth daily at bedtime for 30 days, Disp: 30 tablet, Rfl: 0    senna-docusate sodium (SENOKOT S) 8 6-50 mg per tablet, Take 1 tablet by mouth daily at bedtime for 30 days, Disp: 30 tablet, Rfl: 0    ziprasidone (GEODON) 80 mg capsule, Take 1 capsule (80 mg total) by mouth 3 (three) times a day for 30 days, Disp: 90 capsule, Rfl: 0    Allergies   Allergen Reactions    Latex     Risperdal [Risperidone]     Zyprexa [Olanzapine]        OBJECTIVE  Vitals: There were no vitals filed for this visit  Physical Exam   Constitutional: She appears well-developed and well-nourished   No distress  Neurological: She is alert  Psychiatric: She has a normal mood and affect                    Alessandra Archibald DO, PGY-3  [facility]   10/4/2018

## 2018-10-05 ENCOUNTER — HOSPITAL ENCOUNTER (EMERGENCY)
Facility: HOSPITAL | Age: 58
Discharge: HOME | End: 2018-10-05
Attending: EMERGENCY MEDICINE | Admitting: EMERGENCY MEDICINE
Payer: COMMERCIAL

## 2018-10-05 VITALS
DIASTOLIC BLOOD PRESSURE: 99 MMHG | SYSTOLIC BLOOD PRESSURE: 160 MMHG | RESPIRATION RATE: 16 BRPM | HEART RATE: 87 BPM | BODY MASS INDEX: 27.54 KG/M2 | OXYGEN SATURATION: 100 % | WEIGHT: 155 LBS | TEMPERATURE: 97.9 F

## 2018-10-05 DIAGNOSIS — F13.239 BENZODIAZEPINE WITHDRAWAL (HCC): ICD-10-CM

## 2018-10-05 DIAGNOSIS — F41.9 ANXIETY: Primary | ICD-10-CM

## 2018-10-05 LAB
ATRIAL RATE: 82 BPM
P AXIS: 64 DEGREES
PR INTERVAL: 146 MS
QRS AXIS: 53 DEGREES
QRSD INTERVAL: 82 MS
QT INTERVAL: 406 MS
QTC INTERVAL: 474 MS
T WAVE AXIS: 66 DEGREES
TROPONIN I SERPL-MCNC: <0.02 NG/ML
VENTRICULAR RATE: 82 BPM

## 2018-10-05 PROCEDURE — 93005 ELECTROCARDIOGRAM TRACING: CPT

## 2018-10-05 PROCEDURE — 84484 ASSAY OF TROPONIN QUANT: CPT | Performed by: EMERGENCY MEDICINE

## 2018-10-05 PROCEDURE — 93010 ELECTROCARDIOGRAM REPORT: CPT | Performed by: INTERNAL MEDICINE

## 2018-10-05 PROCEDURE — 99283 EMERGENCY DEPT VISIT LOW MDM: CPT

## 2018-10-05 RX ORDER — LORAZEPAM 1 MG/1
1 TABLET ORAL 3 TIMES DAILY PRN
Qty: 12 TABLET | Refills: 0 | Status: SHIPPED | OUTPATIENT
Start: 2018-10-05 | End: 2018-10-16 | Stop reason: DRUGHIGH

## 2018-10-05 RX ORDER — LORAZEPAM 0.5 MG/1
1 TABLET ORAL ONCE
Status: COMPLETED | OUTPATIENT
Start: 2018-10-05 | End: 2018-10-05

## 2018-10-05 RX ORDER — LORAZEPAM 1 MG/1
1 TABLET ORAL 3 TIMES DAILY PRN
Qty: 15 TABLET | Refills: 0 | Status: SHIPPED | OUTPATIENT
Start: 2018-10-05 | End: 2018-10-23

## 2018-10-05 RX ADMIN — LORAZEPAM 1 MG: 0.5 TABLET ORAL at 18:27

## 2018-10-05 NOTE — ED PROVIDER NOTES
History  Chief Complaint   Patient presents with    Anxiety     Pt states "I am having a severe anxiety attack and I am off of my ativan " Pt states her PCP took her off of ativan "cold turkey" and she needs her ativan  Pt denies SI and states "I do not need to be inpatient, I just need something for my anxiety "     HPI   Patient is a 80-year-old female past medical history major depression, anxiety presenting with a panic attack starting this morning after she ran out of Ativan  Patient states that she was previously prescribed 6 mg a day of Ativan which was abruptly discontinued by her psychiatrist   Following this, patient has needed to present to emergency department and later her family practitioner for additional Ativan  Patient states that she is unsure of why her prescription was abruptly discontinued and that this is not something does explicitly discussed with her psychiatrist, although she states that there therapeutic relationship has deteriorated  Patient states that starting this morning at 2:00 a m  She has had palpitations, shortness of breath, diaphoresis, headache  Patient states that this is consistent with previous episodes of withdrawal from benzodiazepines  Prior to Admission Medications   Prescriptions Last Dose Informant Patient Reported? Taking?    LORazepam (ATIVAN) 1 mg tablet   Yes No   Sig: Take 1 mg by mouth every 8 (eight) hours   LORazepam (ATIVAN) 2 mg tablet  Self No No   Sig: Take 1 tablet (2 mg total) by mouth 2 (two) times a day as needed for anxiety for up to 4 days   PARoxetine (PAXIL) 10 mg tablet   No No   Sig: Take 3 tablets (30 mg total) by mouth daily for 30 days Takes a tab 3 times a day   QUEtiapine (SEROquel) 100 mg tablet  Self No No   Sig: Take 1 tablet (100 mg total) by mouth daily at bedtime for 60 days   Patient not taking: Reported on 9/14/2018    QUEtiapine (SEROquel) 400 MG tablet   No No   Sig: Take 1 tablet (400 mg total) by mouth daily at bedtime for 30 days   busPIRone (BUSPAR) 10 mg tablet  Self Yes No   Sig: Take 10 mg by mouth   calcium carbonate-vitamin D (OSCAL-D) 500 mg-200 units per tablet   No No   Sig: TAKE 1 TABLET BY MOUTH 2 (TWO) TIMES A DAY WITH MEALS FOR 30 DAYS   docusate sodium (COLACE) 100 mg capsule  Self Yes No   Sig: Take 1 capsule by mouth 2 (two) times a day   doxepin (SINEquan) 25 mg capsule   Yes No   Sig: One three times a day   ferrous sulfate 325 (65 Fe) mg tablet   No No   Sig: TAKE 1 TABLET (325 MG TOTAL) BY MOUTH 2 (TWO) TIMES A DAY FOR 90 DAYS   hydrOXYzine HCL (ATARAX) 25 mg tablet   Yes No   Sig: Take 25 mg by mouth every 6 (six) hours   levothyroxine 25 mcg tablet  Self Yes No   Sig: Take 25 mcg by mouth daily  omeprazole (PriLOSEC) 40 MG capsule  Self Yes No   Sig: Take 40 mg by mouth 2 (two) times a day  senna-docusate sodium (SENOKOT S) 8 6-50 mg per tablet  Self No No   Sig: Take 1 tablet by mouth daily at bedtime for 30 days   ziprasidone (GEODON) 80 mg capsule   No No   Sig: Take 1 capsule (80 mg total) by mouth 3 (three) times a day for 30 days      Facility-Administered Medications: None       Past Medical History:   Diagnosis Date    Anxiety     Back pain at L4-L5 level     Schreiber esophagus     Bulimia     Disease of thyroid gland     hypothyroid    GERD (gastroesophageal reflux disease)     Hyperlipidemia     Hypothyroidism     Leukopenia     Sciatica     Seizure (HCC)     Synovial cyst of lumbar spine     Vertigo     Weight gain        Past Surgical History:   Procedure Laterality Date    BONE MARROW BIOPSY      BREAST SURGERY      enlargement     RHINOPLASTY         Family History   Problem Relation Age of Onset    Uterine cancer Mother     Esophageal cancer Father     Colon cancer Maternal Grandmother      I have reviewed and agree with the history as documented      Social History   Substance Use Topics    Smoking status: Never Smoker    Smokeless tobacco: Never Used    Alcohol use No Review of Systems   Constitutional: Positive for diaphoresis  Negative for activity change, appetite change, chills, fatigue, fever and unexpected weight change  Eyes: Negative for visual disturbance  Respiratory: Negative for cough, choking, chest tightness, shortness of breath, wheezing and stridor  Cardiovascular: Positive for palpitations  Negative for chest pain and leg swelling  Gastrointestinal: Positive for nausea  Negative for abdominal distention, abdominal pain and vomiting  Musculoskeletal: Negative for arthralgias and back pain  Skin: Negative for color change, pallor, rash and wound  Neurological: Positive for headaches  Negative for seizures, syncope, light-headedness and numbness  All other systems reviewed and are negative  Physical Exam  ED Triage Vitals   Temperature Pulse Respirations Blood Pressure SpO2   10/05/18 1629 10/05/18 1629 10/05/18 1629 10/05/18 1629 10/05/18 1629   97 9 °F (36 6 °C) 97 20 (!) 194/116 99 %      Temp src Heart Rate Source Patient Position - Orthostatic VS BP Location FiO2 (%)   -- 10/05/18 1915 10/05/18 1915 10/05/18 1915 --    Monitor Lying Right arm       Pain Score       10/05/18 1629       No Pain           Orthostatic Vital Signs  Vitals:    10/05/18 1629 10/05/18 1915   BP: (!) 194/116 160/99   Pulse: 97 87   Patient Position - Orthostatic VS:  Lying       Physical Exam   Constitutional: She is oriented to person, place, and time  She appears well-developed and well-nourished  No distress  HENT:   Head: Normocephalic and atraumatic  Nose: Nose normal    Mouth/Throat: Oropharynx is clear and moist  No oropharyngeal exudate  Eyes: Pupils are equal, round, and reactive to light  Conjunctivae are normal  Right eye exhibits no discharge  Left eye exhibits no discharge  No scleral icterus  Neck: Normal range of motion  Neck supple  Cardiovascular: Regular rhythm, normal heart sounds, intact distal pulses and normal pulses     No extrasystoles are present  Tachycardia present  PMI is not displaced  Exam reveals no gallop and no friction rub  No murmur heard  Pulmonary/Chest: Effort normal and breath sounds normal  No respiratory distress  She has no wheezes  She has no rales  She exhibits no tenderness  Abdominal: Soft  Bowel sounds are normal  She exhibits no distension and no mass  There is no tenderness  There is no rebound and no guarding  Neurological: She is alert and oriented to person, place, and time  Skin: Skin is warm  Capillary refill takes less than 2 seconds  No rash noted  She is not diaphoretic  No erythema  Psychiatric: Her mood appears anxious  ED Medications  Medications   LORazepam (ATIVAN) tablet 1 mg (1 mg Oral Given 10/5/18 1827)       Diagnostic Studies  Results Reviewed     Procedure Component Value Units Date/Time    Troponin I [[id number]]  (Normal) Resulted:  10/05/18 1906    Lab Status:  Final result Specimen:  Blood Updated:  10/05/18 1906     Troponin I <0 02 ng/mL                  No orders to display         Procedures  Procedures      Phone Consults  ED Phone Contact    ED Course                               MDM  Number of Diagnoses or Management Options  Anxiety:   Benzodiazepine withdrawal [facility]):   Diagnosis management comments:   Patient is 55-year-old female past medical history anxiety, major depression presenting with anxiety, palpitations since this morning consistent with prior episodes of benzodiazepine withdrawal  Patient given cardiovascular workup including EKG and troponin which were found to be normal  Plan of care discussed with patient to follow up with therapist in 4 days, establish care with new psychiatrist  Patient discharged to home with 4 day prescription of 1 mg ativan TID to bridge her to future psychiatric care          Amount and/or Complexity of Data Reviewed  Clinical lab tests: ordered  Tests in the medicine section of CPT®: ordered  Review and summarize past medical records: yes    Risk of Complications, Morbidity, and/or Mortality  Presenting problems: low  Diagnostic procedures: minimal  Management options: minimal    Patient Progress  Patient progress: improved    CritCare Time    Disposition  Final diagnoses:   Anxiety   Benzodiazepine withdrawal ([address] )     Time reflects when diagnosis was documented in both MDM as applicable and the Disposition within this note     Time User Action Codes Description Comment    10/5/2018  7:19 PM Dev Sanchez [F41 9] Anxiety     10/5/2018  7:19 PM Dev Sanchez [S75 [id number]] Benzodiazepine withdrawal [facility])       ED Disposition     ED Disposition Condition Comment    Discharge  [facility] discharge to home/self care  Condition at discharge: Good        Follow-up Information     Follow up With Specialties Details Why Contact Info Additional [address] Emergency Department Emergency Medicine  As needed [address] [place]  [phone]  ED, [address] [place] [address], [facility]          Patient's Medications   Discharge Prescriptions    LORAZEPAM (ATIVAN) 1 MG TABLET    Take 1 tablet (1 mg total) by mouth 3 (three) times a day as needed for anxiety for up to 4 days       Start Date: 10/5/2018 End Date: 10/9/2018       Order Dose: 1 mg       Quantity: 15 tablet    Refills: 0     No discharge procedures on file  ED Provider  Attending physically available and evaluated [facility]  I managed the patient along with the ED Attending      Electronically Signed by         Star Crow MD  10/05/18 2778 Nancy Garcia MD  10/05/18 3788

## 2018-10-05 NOTE — ED ATTENDING ATTESTATION
Robbie Omer DO, saw and evaluated the patient  I have discussed the patient with the resident/non-physician practitioner and agree with the resident's/non-physician practitioner's findings, Plan of Care, and MDM as documented in the resident's/non-physician practitioner's note, except where noted  All available labs and Radiology studies were reviewed  At this point I agree with the current assessment done in the Emergency Department  I have conducted an independent evaluation of this patient a history and physical is as follows:    61 yo female presents for evaluation of increased anxiety after she was changed from ativan 2mg TID to buspar without a taper by her psychiatrist  She had returned to her PMD and to the ED to get short supplies of ativan to help with her symptoms since that time  A psychiatrist who works in the same practice but does not usually see her wrote her for ativan 1mg TID x 30 days last month, and doxepin but pt states this isn't working  Pt denies SI/HI, AH/VH  Pt states symptoms are generalized in nature, severe, constant since onset, waxes and wanes  Requests tx for anxiety    Will give pt ativan in ED, rx for ativan 1mg PO TID x #12 NR  Reviewed PDMP, appears to be using medication appropriately and what pt states about being stopped appears to be true based on PDMP records  She was receiving ativan 2mg TID x #90 for months until July 2018 at which point she had no additional rx until she was seen for short supply rx        Critical Care Time  CritCare Time    Procedures

## 2018-10-16 ENCOUNTER — TELEPHONE (OUTPATIENT)
Dept: RADIOLOGY | Facility: CLINIC | Age: 58
End: 2018-10-16

## 2018-10-16 ENCOUNTER — HOSPITAL ENCOUNTER (OUTPATIENT)
Dept: RADIOLOGY | Facility: CLINIC | Age: 58
Discharge: HOME | End: 2018-10-16
Payer: COMMERCIAL

## 2018-10-16 VITALS
TEMPERATURE: 99.2 F | RESPIRATION RATE: 18 BRPM | OXYGEN SATURATION: 98 % | DIASTOLIC BLOOD PRESSURE: 88 MMHG | SYSTOLIC BLOOD PRESSURE: 125 MMHG | HEART RATE: 84 BPM

## 2018-10-16 DIAGNOSIS — M47.816 LUMBAR SPONDYLOSIS: ICD-10-CM

## 2018-10-16 PROCEDURE — 64635 DESTROY LUMB/SAC FACET JNT: CPT | Performed by: ANESTHESIOLOGY

## 2018-10-16 PROCEDURE — 64636 DESTROY L/S FACET JNT ADDL: CPT | Performed by: ANESTHESIOLOGY

## 2018-10-16 RX ORDER — PAROXETINE 30 MG/1
30 TABLET, FILM COATED ORAL DAILY
Refills: 5 | COMMUNITY
Start: 2018-09-25 | End: 2019-05-22 | Stop reason: SDUPTHER

## 2018-10-16 RX ORDER — BUPIVACAINE HYDROCHLORIDE 5 MG/ML
30 INJECTION, SOLUTION EPIDURAL; INTRACAUDAL ONCE
Status: COMPLETED | OUTPATIENT
Start: 2018-10-16 | End: 2018-10-16

## 2018-10-16 RX ORDER — LIDOCAINE HYDROCHLORIDE 10 MG/ML
10 INJECTION, SOLUTION EPIDURAL; INFILTRATION; INTRACAUDAL; PERINEURAL ONCE
Status: COMPLETED | OUTPATIENT
Start: 2018-10-16 | End: 2018-10-16

## 2018-10-16 RX ADMIN — LIDOCAINE HYDROCHLORIDE 8 ML: 10 INJECTION, SOLUTION EPIDURAL; INFILTRATION; INTRACAUDAL; PERINEURAL at 08:17

## 2018-10-16 RX ADMIN — BUPIVACAINE HYDROCHLORIDE 3 ML: 5 INJECTION, SOLUTION EPIDURAL; INTRACAUDAL at 08:25

## 2018-10-16 RX ADMIN — LIDOCAINE HYDROCHLORIDE 3 ML: 20 INJECTION, SOLUTION EPIDURAL; INFILTRATION; INTRACAUDAL; PERINEURAL at 08:22

## 2018-10-16 NOTE — DISCHARGE INSTR - LAB
Medial Branch Radiofrequency Ablation     WHAT YOU NEED TO KNOW:   Medial branch radiofrequency ablation (RFA) is a procedure used to treat facet joint pain in your neck, mid back, or lower back  Facet joints are found at the back of each vertebra  A needle electrode is used to send electrical currents to the nerves in your facet joint  The electrical currents create heat that damages the nerve so it cannot send pain signals  ACTIVITY  · Do not drive or operate machinery today  · No strenuous activity today - bending, lifting, etc   · You may shower today, but do not sit in a tub of water  · Resume normal activities tomorrow as tolerated  CARE OF THE INJECTION SITE  · If you have soreness or pain, apply ice to the area today (20 minutes on/20 minutes off)  · Starting tomorrow, you may use warm, moist heat or ice if needed  · Notify the [facility] if you have any of the following: redness, drainage, swelling, or fever above 100°F     SPECIAL INSTRUCTIONS  · Our office will call you tomorrow for a progress report and make an appointment for a follow up visit in 4 weeks  · If you feel a sunburn-like sensation in the area of your procedure, call our office  MEDICATIONS  · Continue to take all routine medications  · Our office may have instructed you to hold some medications  If you have a problem specifically related to your procedure, please call our office at [phone]  Problems not related to your procedure should be directed to your primary care physician

## 2018-10-16 NOTE — H&P
History of Present Illness: The patient is a 62 y o  female who presents with complaints of low back pain  Patient Active Problem List   Diagnosis    Hyponatremia    Lumbar radiculopathy    DDD (degenerative disc disease), lumbar    Spondylolisthesis at L4-L5 level    Localized osteoporosis without current pathological fracture    Cough    Spinal stenosis of lumbar region with neurogenic claudication    Lumbar spondylosis    T12 compression fracture (HCC)    Synovial cyst of lumbar facet joint    Anxiety    Bulimia nervosa in remission    Constipation    Esophageal reflux    Hyperlipidemia    Hypothyroidism    Other fatigue    Serotonin syndrome    Leukopenia    Schizoaffective disorder, depressive type ([facility] [address] )    ABIMAEL (generalized anxiety disorder)       Past Medical History:   Diagnosis Date    Anxiety     Back pain at L4-L5 level     Schreiber esophagus     Bulimia     Disease of thyroid gland     hypothyroid    GERD (gastroesophageal reflux disease)     Hyperlipidemia     Hypothyroidism     Leukopenia     Sciatica     Seizure (HCC)     Synovial cyst of lumbar spine     Vertigo     Weight gain        Past Surgical History:   Procedure Laterality Date    BONE MARROW BIOPSY      BREAST SURGERY      enlargement     RHINOPLASTY           Current Outpatient Prescriptions:     calcium carbonate-vitamin D (OSCAL-D) 500 mg-200 units per tablet, TAKE 1 TABLET BY MOUTH 2 (TWO) TIMES A DAY WITH MEALS FOR 30 DAYS, Disp: 60 tablet, Rfl: 0    ferrous sulfate 325 (65 Fe) mg tablet, TAKE 1 TABLET (325 MG TOTAL) BY MOUTH 2 (TWO) TIMES A DAY FOR 90 DAYS, Disp: 180 tablet, Rfl: 0    levothyroxine 25 mcg tablet, Take 25 mcg by mouth daily  , Disp: , Rfl:     LORazepam (ATIVAN) 2 mg tablet, Take 1 tablet (2 mg total) by mouth 2 (two) times a day as needed for anxiety for up to 4 days (Patient taking differently: Take 2 mg by mouth 3 (three) times a day as needed for anxiety  ), Disp: 8 tablet, Rfl: 0    omeprazole (PriLOSEC) 40 MG capsule, Take 40 mg by mouth 2 (two) times a day   , Disp: , Rfl:     PARoxetine (PAXIL) 30 mg tablet, Take 30 mg by mouth 3 (three) times a day, Disp: , Rfl: 5    QUEtiapine (SEROquel) 400 MG tablet, Take 1 tablet (400 mg total) by mouth daily at bedtime for 30 days, Disp: 30 tablet, Rfl: 0    ziprasidone (GEODON) 80 mg capsule, Take 1 capsule (80 mg total) by mouth 3 (three) times a day for 30 days, Disp: 90 capsule, Rfl: 0    LORazepam (ATIVAN) 1 mg tablet, Take 1 tablet (1 mg total) by mouth 3 (three) times a day as needed for anxiety for up to 4 days, Disp: 15 tablet, Rfl: 0    Current Facility-Administered Medications:     bupivacaine (PF) (MARCAINE) 0 5 % injection 30 mL, 30 mL, Injection, Once, Tigre Zimmerman, DO    lidocaine (PF) (XYLOCAINE-MPF) 1 % injection 10 mL, 10 mL, Intra-articular, Once, Tigre Zimmerman, DO    lidocaine (PF) (XYLOCAINE-MPF) 2 % injection 4 mL, 4 mL, Intra-articular, Once, Tigre Zimmerman, DO    Allergies   Allergen Reactions    Latex     Risperdal [Risperidone]     Zyprexa [Olanzapine]        Physical Exam:   Vitals:    10/16/18 0756   BP: 109/68   Pulse: 92   Resp: 18   Temp: 99 2 °F (37 3 °C)   SpO2: 95%     General: Awake, Alert, Oriented x 3, Mood and affect appropriate  Respiratory: Respirations even and unlabored  Cardiovascular: Peripheral pulses intact; no edema  Musculoskeletal Exam:   Tenderness to palpation of left lumbar paraspinals from L2-L5  ASA Score: 2    Patient/Chart Verification  Patient ID Verified: Verbal  Consents Confirmed: Procedural, To be obtained in the Pre-Procedure area  H&P( within 30 days) Verified: To be obtained in the Pre-Procedure area  Interval H&P(within 24 hr) Complete (required for Outpatients and Surgery Admit only): To be obtained in the Pre-Procedure area  Allergies Reviewed: Yes  Anticoag/NSAID held?: NA  Currently on antibiotics?: No  Pregnancy denied?: Yes    Assessment:   1   Lumbar spondylosis        Plan: LT L3, L4, L5 RFA

## 2018-10-17 NOTE — TELEPHONE ENCOUNTER
S/w the patient today and she stated she is very sore  She forgot to take off the bandaid's but she will do so after she gets off of the phone  She does have an ovs in 4 weeks to f/u  Reviewed the previous discharge instructions and she will cb if any problems or issues

## 2018-10-17 NOTE — TELEPHONE ENCOUNTER
SUBJECTIVE:  Kofi Corrales is a 68 year old year old  male   seen today for an annual medicare wellness exam.      PAST MEDICAL HISTORY  Past Medical History:   Diagnosis Date   • Degenerative arthritis of spine    • Glaucoma, right eye    • Hayfever    • Hyperlipemia    • Inguinal hernia, right    • Prediabetes        PAST SURGICAL HISTORY  Past Surgical History:   Procedure Laterality Date   • Colonoscopy  2/22/13    next 2023   • Revise eye shunt      right eye for glaucoma       ALLERGIES  ALLERGIES:   Allergen Reactions   • Seasonal Other (See Comments)       MEDICATIONS  Current Outpatient Prescriptions   Medication Sig Dispense Refill   • vardenafil (LEVITRA) 20 MG tablet Take 1 tablet by mouth daily as needed for Erectile Dysfunction. 40 tablet 4   • albuterol (VENTOLIN HFA) 108 (90 Base) MCG/ACT inhaler Inhale 2 puffs into the lungs every 4 hours as needed for Shortness of Breath or Wheezing. 2 Inhaler 1   • metoPROLOL succinate (TOPROL-XL) 50 MG 24 hr tablet Take 1 tablet by mouth daily. 90 tablet 1   • atorvastatin (LIPITOR) 40 MG tablet Take 1 tablet by mouth daily. 90 tablet 1   • latanoprost (XALATAN) 0.005 % ophthalmic solution 1 drop nightly.       No current facility-administered medications for this visit.        Kofi gets his medications filled from [facility].      CURRENT PROVIDERS  Eye - Dr. Ej Ramirez  Dentist - Dr. Shepherd      RISK FACTORS  Risks of heart disease and vascular disease were discussed.  Risks for cancer were discussed including ABCD's of melanoma and need for screening tests.  Patient increased risk of heart disease and stroke due to hypertension, high cholesterol. Patient increased risk of prostate cancer due to family history of prostate cancer. Patient increased risk of respiratory infections due to allergic rhinitis and asthma. Patient increased risk of diabetes due to his elevated fasting blood sugars.  Treatment options and their  aware associated risks and benefits were discussed with the patient.    FAMILY HISTORY  Family History   Problem Relation Age of Onset   • Cancer Father         prostate   • Cancer Brother         colon   • Cancer Mother         breast   • Heart disease Mother         irregular heart beat   • Psychiatry Daughter         depression   • Psychiatry Son         depression       SOCIAL HISTORY  Social History     Social History   • Marital status:      Spouse name: N/A   • Number of children: 3   • Years of education: N/A     Occupational History   • retired      post office     Social History Main Topics   • Smoking status: Never Smoker   • Smokeless tobacco: Never Used   • Alcohol use 1.8 oz/week     3 Standard drinks or equivalent per week   • Drug use: No   • Sexual activity: Yes     Partners: Female     Other Topics Concern   • Seat Belt Yes     Social History Narrative   • No narrative on file     Kofi's ambulation status includes: Patient has no concerns  Comments regarding Umas ability to function in the home include: is able to perform ADLs (activities of daily living) around the home without difficulty or assistance, shops, prepares meals, does housework without difficulty, has no problem using the telephone, has no problem taking/managing medications and drives and is able to get to places outside the home without difficulty  Discussed fall risks including keeping walking area clear in the house, care in the bathroom, avoiding ice and snow, no climbing, avoid carrying things down stairs, regular exercise.    Exercises: marching and weights    Comments regarding Umas nutritional status include:eats 3 meals per day    Nurse's notes including Medicare Health Risk Assessment in electronic health record  reviewed.   Depression: Patient without history of depression or mood disorders.  Patient recently has not had problems with enjoying life, no problems functioning, no increased irritability.  Please see patients  depression questionaire.  Vision and hearing screens documented and reviewed.   Seat belts worn when in a vehicle  Advanced Directive: has an advanced directive - a copy has been provided  Cognitive Assessment: no evidence of cognitive dysfunction by direct observation    PHYSICAL EXAM  Visit Vitals  /60 (BP Location: [facility] [place], Patient Position: Sitting, Cuff Size: Small Adult)   Pulse 52   Resp 15   Ht 5' 9.25\" (1.759 m)   Wt 84.6 kg   BMI 27.33 kg/m²     Constitutional:  Well developed, well nourished, no acute distress, non-toxic appearance   Eyes:  Pupils equal, round, reactive to light   HENT:  Atraumatic, nose normal, oropharynx moist. Neck supple  Respiratory:  Normal respiratory effort, normal breath sounds   Cardiovascular:  Normal rate, normal rhythm, no murmurs   Gastrointestinal:  Soft, nondistended, normal bowel sounds, nontender.  Musculoskeletal:  No edema   Neurologic:  Alert & oriented x 3  Psychiatric:  Speech and behavior appropriate    PREVENTATIVE MEDICINE  Health Maintenance   Topic Date Due   • Influenza Vaccine (1) 09/01/2018   • Medicare Wellness 65+  07/12/2019   • Depression Screening  07/12/2019   • Colorectal Cancer Screening-Colonoscopy  02/22/2023   • DTaP/Tdap/Td Vaccine (3 - Td) 07/12/2028   • Hepatitis C Screening  Completed   • Pneumococcal Vaccine 65+ Low/Medium Risk  Completed     Needed Screening/Treatment:   Immunizations reviewed and patient needs: Herpes Zoster and DT  Needed follow up:  None      LABS    Lab Services on 07/05/2018   Component Date Value Ref Range Status   • Fasting Status 07/05/2018 14  hrs Final   • Sodium 07/05/2018 139  135 - 145 mmol/L Final   • Potassium 07/05/2018 4.5  3.4 - 5.1 mmol/L Final   • Chloride 07/05/2018 102  98 - 107 mmol/L Final   • Carbon Dioxide 07/05/2018 27  21 - 32 mmol/L Final   • Anion Gap 07/05/2018 15  10 - 20 mmol/L Final   • Glucose 07/05/2018 111* 65 - 99 mg/dL Final   • BUN 07/05/2018 17  6 - 20 mg/dL Final   • Creatinine  07/05/2018 0.95  0.67 - 1.17 mg/dL Final   • GFR Estimate,  07/05/2018 >90   Final   • GFR Estimate, Non  07/05/2018 82   Final   • BUN/Creatinine Ratio 07/05/2018 18  7 - 25 Final   • CALCIUM 07/05/2018 9.0  8.4 - 10.2 mg/dL Final   • FASTING STATUS 07/05/2018 14  hrs Final   • CHOLESTEROL 07/05/2018 127  <200 mg/dL Final   • CALCULATED LDL 07/05/2018 46  <130 mg/dL Final   • HDL 07/05/2018 35* >39 mg/dL Final   • TRIGLYCERIDE 07/05/2018 229* <150 mg/dL Final   • CALCULATED NON HDL 07/05/2018 92  mg/dL Final   • CHOL/HDL 07/05/2018 3.6  <4.5 Final   • ALT/SGPT 07/05/2018 35  <79 Units/L Final         ASSESSMENT  Normal Annual Medicare Wellness Exam      PLAN  Discussed health and wellness.  Recommend a low fat/low cholesterol/low salt diet.  Exercise for at least 30 minutes most days of the week.  Work on weight loss.  Calcium 1200 mg and Vitamin D 1000 IU daily.  Alcohol and caffeine in moderation.  Patient was given written informational material on diet, exercise, weight loss, calcium/vitamin D, cholesterol and power of  for health care.  Patient will be notified of all pending lab results.  Recommend a yearly physical.    Patient doing well. Lives with his wife in a one-story home with a basement. 3 children, 2 in [place]. Patient's last colonoscopy 2/13. He believes his brother may have had colon cancer. Patient check on this. If this is true, patient should require colonoscopy now. Otherwise would follow-up in 5 years. Father with prostate cancer, PSA was done. Patient due for tetanus shot. Recommended shingles shot. Recommended flu shot in the fall. Check a cholesterol and glucose for cardiac risk assessment. Encouraged regular aerobic exercise. Patient functioning well at home, needs are being met.    See Patient Instructions section  Return in about 1 year (around 7/12/2019) for Medicare Wellness Visit.

## 2018-10-23 ENCOUNTER — HOSPITAL ENCOUNTER (EMERGENCY)
Facility: HOSPITAL | Age: 58
Discharge: HOME | End: 2018-10-23
Attending: EMERGENCY MEDICINE | Admitting: EMERGENCY MEDICINE
Payer: COMMERCIAL

## 2018-10-23 ENCOUNTER — APPOINTMENT (EMERGENCY)
Dept: RADIOLOGY | Facility: HOSPITAL | Age: 58
End: 2018-10-23
Payer: COMMERCIAL

## 2018-10-23 VITALS
OXYGEN SATURATION: 99 % | TEMPERATURE: 99.2 F | HEART RATE: 86 BPM | DIASTOLIC BLOOD PRESSURE: 94 MMHG | HEIGHT: 63 IN | BODY MASS INDEX: 27.46 KG/M2 | WEIGHT: 155 LBS | RESPIRATION RATE: 18 BRPM | SYSTOLIC BLOOD PRESSURE: 170 MMHG

## 2018-10-23 DIAGNOSIS — R10.9 RIGHT FLANK PAIN: Primary | ICD-10-CM

## 2018-10-23 LAB
ALBUMIN SERPL BCP-MCNC: 3.6 G/DL (ref 3.5–5)
ALP SERPL-CCNC: 85 U/L (ref 46–116)
ALT SERPL W P-5'-P-CCNC: 17 U/L (ref 12–78)
ANION GAP SERPL CALCULATED.3IONS-SCNC: 6 MMOL/L (ref 4–13)
AST SERPL W P-5'-P-CCNC: 14 U/L (ref 5–45)
BACTERIA UR QL AUTO: ABNORMAL /HPF
BASOPHILS # BLD AUTO: 0.03 THOUSANDS/ΜL (ref 0–0.1)
BASOPHILS NFR BLD AUTO: 1 % (ref 0–1)
BILIRUB SERPL-MCNC: 0.24 MG/DL (ref 0.2–1)
BILIRUB UR QL STRIP: NEGATIVE
BUN SERPL-MCNC: 4 MG/DL (ref 5–25)
CALCIUM SERPL-MCNC: 9 MG/DL (ref 8.3–10.1)
CHLORIDE SERPL-SCNC: 94 MMOL/L (ref 100–108)
CLARITY UR: CLEAR
CO2 SERPL-SCNC: 29 MMOL/L (ref 21–32)
COLOR UR: YELLOW
CREAT SERPL-MCNC: 0.58 MG/DL (ref 0.6–1.3)
EOSINOPHIL # BLD AUTO: 0.04 THOUSAND/ΜL (ref 0–0.61)
EOSINOPHIL NFR BLD AUTO: 1 % (ref 0–6)
ERYTHROCYTE [DISTWIDTH] IN BLOOD BY AUTOMATED COUNT: 13.2 % (ref 11.6–15.1)
GFR SERPL CREATININE-BSD FRML MDRD: 102 ML/MIN/1.73SQ M
GLUCOSE SERPL-MCNC: 93 MG/DL (ref 65–140)
GLUCOSE UR STRIP-MCNC: NEGATIVE MG/DL
HCT VFR BLD AUTO: 36.2 % (ref 34.8–46.1)
HGB BLD-MCNC: 12.2 G/DL (ref 11.5–15.4)
HGB UR QL STRIP.AUTO: NEGATIVE
IMM GRANULOCYTES # BLD AUTO: 0 THOUSAND/UL (ref 0–0.2)
IMM GRANULOCYTES NFR BLD AUTO: 0 % (ref 0–2)
KETONES UR STRIP-MCNC: NEGATIVE MG/DL
LEUKOCYTE ESTERASE UR QL STRIP: ABNORMAL
LYMPHOCYTES # BLD AUTO: 0.58 THOUSANDS/ΜL (ref 0.6–4.47)
LYMPHOCYTES NFR BLD AUTO: 18 % (ref 14–44)
MCH RBC QN AUTO: 28.5 PG (ref 26.8–34.3)
MCHC RBC AUTO-ENTMCNC: 33.7 G/DL (ref 31.4–37.4)
MCV RBC AUTO: 85 FL (ref 82–98)
MONOCYTES # BLD AUTO: 0.18 THOUSAND/ΜL (ref 0.17–1.22)
MONOCYTES NFR BLD AUTO: 6 % (ref 4–12)
NEUTROPHILS # BLD AUTO: 2.35 THOUSANDS/ΜL (ref 1.85–7.62)
NEUTS SEG NFR BLD AUTO: 74 % (ref 43–75)
NITRITE UR QL STRIP: NEGATIVE
NON-SQ EPI CELLS URNS QL MICRO: ABNORMAL /HPF
NRBC BLD AUTO-RTO: 0 /100 WBCS
PH UR STRIP.AUTO: 6.5 [PH] (ref 4.5–8)
PLATELET # BLD AUTO: 263 THOUSANDS/UL (ref 149–390)
PMV BLD AUTO: 8.8 FL (ref 8.9–12.7)
POTASSIUM SERPL-SCNC: 3 MMOL/L (ref 3.5–5.3)
PROT SERPL-MCNC: 7.1 G/DL (ref 6.4–8.2)
PROT UR STRIP-MCNC: NEGATIVE MG/DL
RBC # BLD AUTO: 4.28 MILLION/UL (ref 3.81–5.12)
RBC #/AREA URNS AUTO: ABNORMAL /HPF
SODIUM SERPL-SCNC: 129 MMOL/L (ref 136–145)
SP GR UR STRIP.AUTO: 1 (ref 1–1.03)
UROBILINOGEN UR QL STRIP.AUTO: 0.2 E.U./DL
WBC # BLD AUTO: 3.18 THOUSAND/UL (ref 4.31–10.16)
WBC #/AREA URNS AUTO: ABNORMAL /HPF

## 2018-10-23 PROCEDURE — 74176 CT ABD & PELVIS W/O CONTRAST: CPT

## 2018-10-23 PROCEDURE — 80053 COMPREHEN METABOLIC PANEL: CPT | Performed by: EMERGENCY MEDICINE

## 2018-10-23 PROCEDURE — 96360 HYDRATION IV INFUSION INIT: CPT

## 2018-10-23 PROCEDURE — 85025 COMPLETE CBC W/AUTO DIFF WBC: CPT | Performed by: EMERGENCY MEDICINE

## 2018-10-23 PROCEDURE — 36415 COLL VENOUS BLD VENIPUNCTURE: CPT | Performed by: EMERGENCY MEDICINE

## 2018-10-23 PROCEDURE — 81001 URINALYSIS AUTO W/SCOPE: CPT | Performed by: EMERGENCY MEDICINE

## 2018-10-23 PROCEDURE — 96372 THER/PROPH/DIAG INJ SC/IM: CPT

## 2018-10-23 PROCEDURE — 99284 EMERGENCY DEPT VISIT MOD MDM: CPT

## 2018-10-23 RX ORDER — KETOROLAC TROMETHAMINE 30 MG/ML
15 INJECTION, SOLUTION INTRAMUSCULAR; INTRAVENOUS ONCE
Status: COMPLETED | OUTPATIENT
Start: 2018-10-23 | End: 2018-10-23

## 2018-10-23 RX ORDER — POTASSIUM CHLORIDE 20 MEQ/1
40 TABLET, EXTENDED RELEASE ORAL ONCE
Status: COMPLETED | OUTPATIENT
Start: 2018-10-23 | End: 2018-10-23

## 2018-10-23 RX ORDER — POTASSIUM CHLORIDE 20 MEQ/1
80 TABLET, EXTENDED RELEASE ORAL ONCE
Status: DISCONTINUED | OUTPATIENT
Start: 2018-10-23 | End: 2018-10-23

## 2018-10-23 RX ORDER — CEPHALEXIN 250 MG/1
500 CAPSULE ORAL ONCE
Status: COMPLETED | OUTPATIENT
Start: 2018-10-23 | End: 2018-10-23

## 2018-10-23 RX ORDER — CEPHALEXIN 500 MG/1
500 CAPSULE ORAL 2 TIMES DAILY
Qty: 14 CAPSULE | Refills: 0 | Status: SHIPPED | OUTPATIENT
Start: 2018-10-23 | End: 2018-10-30

## 2018-10-23 RX ADMIN — SODIUM CHLORIDE 1000 ML: 0.9 INJECTION, SOLUTION INTRAVENOUS at 17:35

## 2018-10-23 RX ADMIN — CEPHALEXIN 500 MG: 250 CAPSULE ORAL at 17:59

## 2018-10-23 RX ADMIN — POTASSIUM CHLORIDE 40 MEQ: 1500 TABLET, EXTENDED RELEASE ORAL at 17:59

## 2018-10-23 RX ADMIN — KETOROLAC TROMETHAMINE 15 MG: 30 INJECTION, SOLUTION INTRAMUSCULAR at 16:04

## 2018-10-23 NOTE — ED PROVIDER NOTES
History  Chief Complaint   Patient presents with    Flank Pain     per EMS, "foul smelling urine for about a month, left flank pain today"     This is a 51-year-old female who presents with right flank pain  She has history of hypothyroidism, hyperlipidemia, chronic back pain, GERD  The patient states the pain started earlier today, is intermittent in nature lasting only several seconds, and occurring several times every hour  She states the pain is very severe, making her yell in pain, before spontaneously resolving  The pain is described as sharp, nonradiating, and non migratory  On my evaluation, the patient is currently pain-free  The patient states she was admitted to the hospital recently, and during that hospitalization had a Bernardo catheter in place  She states that since that Bernardo catheter was removed, she has been having consistent foul-smelling, cloudy urine, which she hopes would resolve spontaneously, but has persisted  She denies any urinary symptoms, specifically frequency, urgency, or dysuria  She denies any incontinence, urinary retention, saddle anesthesia, or lower extremity paresthesias/pain  The patient does have a chronic history of back pain, and is status post radiofrequency denervation of the lumbar facet joints on 10/16 at L1-L3, but states that her pain at that point had resolved, that her current pain is not similar to any nature to her chronic back pain  She denies fevers, chills, lightheadedness, dizziness, nausea, vomiting, diarrhea, abdominal pain, recent travel, recent surgeries  She has no history kidney stones  Prior to Admission Medications   Prescriptions Last Dose Informant Patient Reported? Taking?    LORazepam (ATIVAN) 2 mg tablet 10/23/2018 at Unknown time Self No Yes   Sig: Take 1 tablet (2 mg total) by mouth 2 (two) times a day as needed for anxiety for up to 4 days   Patient taking differently: Take 2 mg by mouth 3 (three) times a day as needed for anxiety     PARoxetine (PAXIL) 30 mg tablet 10/23/2018 at Unknown time  Yes Yes   Sig: Take 30 mg by mouth daily     QUEtiapine (SEROquel) 400 MG tablet 10/22/2018 at Unknown time  No Yes   Sig: Take 1 tablet (400 mg total) by mouth daily at bedtime for 30 days   calcium carbonate-vitamin D (OSCAL-D) 500 mg-200 units per tablet 10/22/2018 at Unknown time  No Yes   Sig: TAKE 1 TABLET BY MOUTH 2 (TWO) TIMES A DAY WITH MEALS FOR 30 DAYS   ferrous sulfate 325 (65 Fe) mg tablet 10/22/2018 at Unknown time  No Yes   Sig: TAKE 1 TABLET (325 MG TOTAL) BY MOUTH 2 (TWO) TIMES A DAY FOR 90 DAYS   levothyroxine 25 mcg tablet 10/23/2018 at Unknown time Self Yes Yes   Sig: Take 25 mcg by mouth daily  omeprazole (PriLOSEC) 40 MG capsule 10/23/2018 at Unknown time Self Yes Yes   Sig: Take 80 mg by mouth 2 (two) times a day        Facility-Administered Medications: None       Past Medical History:   Diagnosis Date    Anxiety     Back pain at L4-L5 level     Schreiber esophagus     Bulimia     Disease of thyroid gland     hypothyroid    GERD (gastroesophageal reflux disease)     Hyperlipidemia     Hypothyroidism     Leukopenia     Sciatica     Seizure ([address] )     Synovial cyst of lumbar spine     Vertigo     Weight gain        Past Surgical History:   Procedure Laterality Date    BONE MARROW BIOPSY      BREAST SURGERY      enlargement     RHINOPLASTY         Family History   Problem Relation Age of Onset    Uterine cancer Mother     Esophageal cancer Father     Colon cancer Maternal Grandmother      I have reviewed and agree with the history as documented  Social History   Substance Use Topics    Smoking status: Never Smoker    Smokeless tobacco: Never Used    Alcohol use No        Review of Systems   Constitutional: Negative for chills and fever  HENT: Negative for congestion and rhinorrhea  Eyes: Negative for photophobia and visual disturbance  Respiratory: Negative for cough and shortness of breath  Cardiovascular: Negative for chest pain and palpitations  Gastrointestinal: Negative for abdominal pain, diarrhea, nausea and vomiting  Genitourinary: Positive for flank pain (Right-sided)  Negative for dysuria and frequency  Musculoskeletal: Positive for back pain  Negative for neck pain and neck stiffness  Skin: Negative for pallor and rash  Neurological: Negative for light-headedness and headaches  All other systems reviewed and are negative  Physical Exam  ED Triage Vitals [10/23/18 1516]   Temperature Pulse Respirations Blood Pressure SpO2   99 2 °F (37 3 °C) 86 18 170/94 99 %      Temp Source Heart Rate Source Patient Position - Orthostatic VS BP Location FiO2 (%)   Oral Monitor -- -- --      Pain Score       7           Orthostatic Vital Signs  Vitals:    10/23/18 1516   BP: 170/94   Pulse: 86       Physical Exam   Constitutional: She is oriented to person, place, and time  Awake alert, well-appearing, no acute distress  Nontoxic in appearance  Appears stated age   HENT:   Head: Normocephalic and atraumatic  Mouth/Throat: Oropharynx is clear and moist  No oropharyngeal exudate  Eyes: Pupils are equal, round, and reactive to light  No scleral icterus  Neck: Normal range of motion  No JVD present  Cardiovascular: Normal rate, regular rhythm and normal heart sounds  No murmur heard  Pulmonary/Chest: Effort normal  No respiratory distress  She has no wheezes  She has no rales  Abdominal: Soft  There is tenderness to palpation of the right paraspinal muscles, as well as the right flank at the level of T11-T12  There is no overlying skin changes  Pain is reproducible with abdominal flexion  Abdomen is soft, nontender, nondistended   Musculoskeletal: Normal range of motion  She exhibits no edema  Neurological: She is alert and oriented to person, place, and time  She exhibits normal muscle tone  Strength is 5/5 in all extremities bilaterally    Sensation grossly intact in all extremities  Skin: Skin is warm and dry  No rash noted  No pallor         ED Medications  Medications   ketorolac (TORADOL) injection 15 mg (15 mg Intramuscular Given 10/23/18 1604)   sodium chloride 0 9 % bolus 1,000 mL (0 mL Intravenous Stopped 10/23/18 1823)   cephalexin (KEFLEX) capsule 500 mg (500 mg Oral Given 10/23/18 1759)   potassium chloride (K-DUR,KLOR-CON) CR tablet 40 mEq (40 mEq Oral Given 10/23/18 1759)       Diagnostic Studies  Results Reviewed     Procedure Component Value Units Date/Time    Urine Microscopic [[id number]]  (Abnormal) Collected:  10/23/18 1602    Lab Status:  Final result Specimen:  Urine from Urine, Clean Catch Updated:  10/23/18 1828     RBC, UA None Seen /hpf      WBC, UA 2-4 (A) /hpf      Epithelial Cells None Seen /hpf      Bacteria, UA Moderate (A) /hpf     UA w Reflex to Microscopic w Reflex to Culture [[id number]]  (Abnormal) Collected:  10/23/18 1602    Lab Status:  Final result Specimen:  Urine from Urine, Clean Catch Updated:  10/23/18 1649     Color, UA Yellow     Clarity, UA Clear     Specific [place], UA 1 001 (L)     pH, UA 6 5     Leukocytes, UA Small (A)     Nitrite, UA Negative     Protein, UA Negative mg/dl      Glucose, UA Negative mg/dl      Ketones, UA Negative mg/dl      Urobilinogen, UA 0 2 E U /dl      Bilirubin, UA Negative     Blood, UA Negative    Comprehensive metabolic panel [[id number]]  (Abnormal) Collected:  10/23/18 1602    Lab Status:  Final result Specimen:  Blood from Arm, Right Updated:  10/23/18 1644     Sodium 129 (L) mmol/L      Potassium 3 0 (L) mmol/L      Chloride 94 (L) mmol/L      CO2 29 mmol/L      ANION GAP 6 mmol/L      BUN 4 (L) mg/dL      Creatinine 0 58 (L) mg/dL      Glucose 93 mg/dL      Calcium 9 0 mg/dL      AST 14 U/L      ALT 17 U/L      Alkaline Phosphatase 85 U/L      Total Protein 7 1 g/dL      Albumin 3 6 g/dL      Total Bilirubin 0 24 mg/dL      eGFR 102 ml/min/1 73sq m     Narrative:         National Kidney Disease Education Program recommendations are as follows:  GFR calculation is accurate only with a steady state creatinine  Chronic Kidney disease less than 60 ml/min/1 73 sq  meters  Kidney failure less than 15 ml/min/1 73 sq  meters  CBC and differential [[id number]]  (Abnormal) Collected:  10/23/18 1602    Lab Status:  Final result Specimen:  Blood from Arm, Right Updated:  10/23/18 1626     WBC 3 18 (L) Thousand/uL      RBC 4 28 Million/uL      Hemoglobin 12 2 g/dL      Hematocrit 36 2 %      MCV 85 fL      MCH 28 5 pg      MCHC 33 7 g/dL      RDW 13 2 %      MPV 8 8 (L) fL      Platelets 978 Thousands/uL      nRBC 0 /100 WBCs      Neutrophils Relative 74 %      Immat GRANS % 0 %      Lymphocytes Relative 18 %      Monocytes Relative 6 %      Eosinophils Relative 1 %      Basophils Relative 1 %      Neutrophils Absolute 2 35 Thousands/µL      Immature Grans Absolute 0 00 Thousand/uL      Lymphocytes Absolute 0 58 (L) Thousands/µL      Monocytes Absolute 0 18 Thousand/µL      Eosinophils Absolute 0 04 Thousand/µL      Basophils Absolute 0 03 Thousands/µL                  CT renal stone study abdomen pelvis wo contrast   Final Result by Davie Panchal MD (10/23 1654)      No acute pathology  No urolithiasis or obstructive uropathy  Workstation performed: [id number]               Procedures  Procedures      Phone Consults  ED Phone Contact    ED Course  ED Course as of Oct 23 1940   Tue Oct 23, 2018   1746 Patient re-evaluated at bedside  Pain currently significantly improved, and patient is currently pain-free  Lab work and Imaging reviewed and discussed with the patient personally  -chemistry significant for possible dehydration  Will give a liter of crystalloid  -will give 40 of K-Dur for hypokalemia  -urinalysis with sterile pyuria, which based on the patient's symptoms of cloudy, foul-smelling urine, is concerning for urinary tract infection  Micro still pending    Will give a dose of Keflex now, and discharged home with a 7 day course of Keflex   -plans for discharge  Strict return precautions provided  Advised the patient follow up with her primary care physician for reassessment for symptom resolution  Advised to take Tylenol and ibuprofen as needed for pain  MDM  Number of Diagnoses or Management Options  Right flank pain:   Diagnosis management comments: This is a 35-year-old female who presents with acute onset intermittent sharp right-sided flank pain, worse with movement, concerning for musculoskeletal pain, though also with historically cloudy urine, concerning for possible urinary tract infection  On arrival, the patient is afebrile, hemodynamically stable, well-appearing, without acute distress  The patient's pain is reproducible with motion, as well as tender to palpation over the right flank and right paraspinal muscles at the level of T10-T11  The rest of her physical exam is largely unremarkable   -will check CBC, CMP  -CT stone study to rule out nephrolithiasis  -urinalysis rule out UTI  -Toradol for pain  -plan to re-evaluate  Disposition currently pending, likely discharge home with suspected musculoskeletal pain if her workup is unremarkable  CritCare Time    Disposition  Final diagnoses:   Right flank pain     Time reflects when diagnosis was documented in both MDM as applicable and the Disposition within this note     Time User Action Codes Description Comment    10/23/2018  5:15 PM Bell Wall Add [R10 9] Right flank pain       ED Disposition     ED Disposition Condition Comment    Discharge  [address] [facility] discharge to home/self care      Condition at discharge: Good        Follow-up Information     Follow up With Specialties Details Why Contact Info    [facility]    [phone]            Discharge Medication List as of 10/23/2018  5:55 PM      START taking these medications    Details   cephalexin (KEFLEX) 500 mg capsule Take 1 capsule (500 mg total) by mouth 2 (two) times a day for 7 days, Starting Tue 10/23/2018, Until Tue 10/30/2018, Print         CONTINUE these medications which have NOT CHANGED    Details   calcium carbonate-vitamin D (OSCAL-D) 500 mg-200 units per tablet TAKE 1 TABLET BY MOUTH 2 (TWO) TIMES A DAY WITH MEALS FOR 30 DAYS, Starting Mon 10/1/2018, Until Wed 10/31/2018, Normal      ferrous sulfate 325 (65 Fe) mg tablet TAKE 1 TABLET (325 MG TOTAL) BY MOUTH 2 (TWO) TIMES A DAY FOR 90 DAYS, Starting Tue 9/25/2018, Until Mon 12/24/2018, Normal      levothyroxine 25 mcg tablet Take 25 mcg by mouth daily  , Historical Med      LORazepam (ATIVAN) 2 mg tablet Take 1 tablet (2 mg total) by mouth 2 (two) times a day as needed for anxiety for up to 4 days, Starting Mon 9/10/2018, Until Tue 10/23/2018, Print      omeprazole (PriLOSEC) 40 MG capsule Take 80 mg by mouth 2 (two) times a day  , Historical Med      PARoxetine (PAXIL) 30 mg tablet Take 30 mg by mouth daily  , Starting Tue 9/25/2018, Historical Med      QUEtiapine (SEROquel) 400 MG tablet Take 1 tablet (400 mg total) by mouth daily at bedtime for 30 days, Starting Fri 9/14/2018, Until Tue 10/23/2018, Print           No discharge procedures on file  ED Provider  Attending physically available and evaluated Yumiko Lozano  I managed the patient along with the ED Attending      Electronically Signed by         Anayeli Ramos MD  10/25/18 5502

## 2018-10-23 NOTE — DISCHARGE INSTRUCTIONS
Abdominal Pain   WHAT YOU NEED TO KNOW:   Abdominal pain can be dull, achy, or sharp  You may have pain in one area of your abdomen, or in your entire abdomen  Your pain may be caused by a condition such as constipation, food sensitivity or poisoning, infection, or a blockage  Abdominal pain can also be from a hernia, appendicitis, or an ulcer  Liver, gallbladder, or kidney conditions can also cause abdominal pain  The cause of your abdominal pain may be unknown  DISCHARGE INSTRUCTIONS:   Return to the emergency department if:   · You have new chest pain or shortness of breath  · You have pulsing pain in your upper abdomen or lower back that suddenly becomes constant  · Your pain is in the right lower abdominal area and worsens with movement  · You have a fever over 100 4°F (38°C) or shaking chills  · You are vomiting and cannot keep food or liquids down  · Your pain does not improve or gets worse over the next 8 to 12 hours  · You see blood in your vomit or bowel movements, or they look black and tarry  · Your skin or the whites of your eyes turn yellow  · You are a woman and have a large amount of vaginal bleeding that is not your monthly period  Contact your healthcare provider if:   · You have pain in your lower back  · You are a man and have pain in your testicles  · You have pain when you urinate  · You have questions or concerns about your condition or care  Follow up with your healthcare provider within 24 hours or as directed:  Write down your questions so you remember to ask them during your visits  Medicines:   · Medicines  may be given to calm your stomach and prevent vomiting or to decrease pain  Ask how to take pain medicine safely  · Take your medicine as directed  Contact your healthcare provider if you think your medicine is not helping or if you have side effects  Tell him of her if you are allergic to any medicine   Keep a list of the medicines, vitamins, and herbs you take  Include the amounts, and when and why you take them  Bring the list or the pill bottles to follow-up visits  Carry your medicine list with you in case of an emergency  © 2017 [facility] Information is for End User's use only and may not be sold, redistributed or otherwise used for commercial purposes  All illustrations and images included in CareNotes® are the copyrighted property of [employer]  or [employer]  The above information is an  only  It is not intended as medical advice for individual conditions or treatments  Talk to your doctor, nurse or pharmacist before following any medical regimen to see if it is safe and effective for you  Urinary Tract Infection in Women   WHAT YOU NEED TO KNOW:   A urinary tract infection (UTI) is caused by bacteria that get inside your urinary tract  Most bacteria that enter your urinary tract come out when you urinate  If the bacteria stay in your urinary tract, you may get an infection  Your urinary tract includes your kidneys, ureters, bladder, and urethra  Urine is made in your kidneys, and it flows from the ureters to the bladder  Urine leaves the bladder through the urethra  A UTI is more common in your lower urinary tract, which includes your bladder and urethra  DISCHARGE INSTRUCTIONS:   Seek care immediately if:   · You are urinating very little or not at all  · You have a high fever with shaking chills  · You have side or back pain that gets worse  Contact your healthcare provider if:   · You have a fever  · You do not feel better after 2 days of taking antibiotics  · You are vomiting  · You have questions or concerns about your condition or care  Medicines:   · Antibiotics  help fight a bacterial infection  · Medicines  may be given to decrease pain and burning when you urinate  They will also help decrease the feeling that you need to urinate often  These medicines will make your urine orange or red  · Take your medicine as directed  Contact your healthcare provider if you think your medicine is not helping or if you have side effects  Tell him or her if you are allergic to any medicine  Keep a list of the medicines, vitamins, and herbs you take  Include the amounts, and when and why you take them  Bring the list or the pill bottles to follow-up visits  Carry your medicine list with you in case of an emergency  Follow up with your healthcare provider as directed:  Write down your questions so you remember to ask them during your visits  Prevent another UTI:   · Empty your bladder often  Urinate and empty your bladder as soon as you feel the need  Do not hold your urine for long periods of time  · Wipe from front to back after you urinate or have a bowel movement  This will help prevent germs from getting into your urinary tract through your urethra  · Drink liquids as directed  Ask how much liquid to drink each day and which liquids are best for you  You may need to drink more liquids than usual to help flush out the bacteria  Do not drink alcohol, caffeine, or citrus juices  These can irritate your bladder and increase your symptoms  Your healthcare provider may recommend cranberry juice to help prevent a UTI  · Urinate after you have sex  This can help flush out bacteria passed during sex  · Do not douche or use feminine deodorants  These can change the chemical balance in your vagina  · Change sanitary pads or tampons often  This will help prevent germs from getting into your urinary tract  · Do pelvic muscle exercises often  Pelvic muscle exercises may help you start and stop urinating  Strong pelvic muscles may help you empty your bladder easier  Squeeze these muscles tightly for 5 seconds like you are trying to hold back urine  Then relax for 5 seconds  Gradually work up to squeezing for 10 seconds   Do 3 sets of 15 repetitions a day, or as directed  © 2017 [facility] Information is for End User's use only and may not be sold, redistributed or otherwise used for commercial purposes  All illustrations and images included in CareNotes® are the copyrighted property of [employer] M [employer]  or [employer]f Orellana  The above information is an  only  It is not intended as medical advice for individual conditions or treatments  Talk to your doctor, nurse or pharmacist before following any medical regimen to see if it is safe and effective for you

## 2018-10-23 NOTE — ED ATTENDING ATTESTATION
Alysha Saini MD, saw and evaluated the patient  I have discussed the patient with the resident/non-physician practitioner and agree with the resident's/non-physician practitioner's findings, Plan of Care, and MDM as documented in the resident's/non-physician practitioner's note, except where noted  All available labs and Radiology studies were reviewed  At this point I agree with the current assessment done in the Emergency Department    I have conducted an independent evaluation of this patient a history and physical is as follows:   Pt started with r flank pain Pt also states cloudy urine since having rodrigez placed Pain is positional PE : alert tender in thoracic flank area  abd soft nontender MDM: will do urine labs ct stone study     Critical Care Time  CritCare Time    Procedures

## 2018-10-24 ENCOUNTER — TELEPHONE (OUTPATIENT)
Dept: FAMILY MEDICINE CLINIC | Facility: CLINIC | Age: 58
End: 2018-10-24

## 2018-10-24 NOTE — TELEPHONE ENCOUNTER
Dr Daren Victor,  patient went to ER at [facility]  for severe back pain at level 10  Patient states,  She was given Toradol and an antibiotic  But no instruction for further pain  Please  Advise

## 2018-10-28 DIAGNOSIS — E03.9 ACQUIRED HYPOTHYROIDISM: ICD-10-CM

## 2018-10-29 RX ORDER — LEVOTHYROXINE SODIUM 0.03 MG/1
TABLET ORAL
Qty: 30 TABLET | Refills: 2 | Status: SHIPPED | OUTPATIENT
Start: 2018-10-29 | End: 2018-11-01

## 2018-10-30 ENCOUNTER — OFFICE VISIT (OUTPATIENT)
Dept: FAMILY MEDICINE CLINIC | Facility: CLINIC | Age: 58
End: 2018-10-30
Payer: COMMERCIAL

## 2018-10-30 VITALS
DIASTOLIC BLOOD PRESSURE: 98 MMHG | RESPIRATION RATE: 14 BRPM | SYSTOLIC BLOOD PRESSURE: 150 MMHG | HEART RATE: 78 BPM | BODY MASS INDEX: 26.05 KG/M2 | HEIGHT: 63 IN | TEMPERATURE: 100 F | WEIGHT: 147 LBS

## 2018-10-30 DIAGNOSIS — R10.9 FLANK PAIN: Primary | ICD-10-CM

## 2018-10-30 PROCEDURE — 99213 OFFICE O/P EST LOW 20 MIN: CPT | Performed by: FAMILY MEDICINE

## 2018-10-30 RX ORDER — DOXEPIN HYDROCHLORIDE 25 MG/1
CAPSULE ORAL
COMMUNITY
Start: 2018-10-18 | End: 2018-11-01

## 2018-10-30 NOTE — PROGRESS NOTES
Assessment/Plan     Flank pain  - likely musculoskeletal, had tender point on posterior rib, improvement with OMT  - she was already treated with keflex for suspected UTI, no urinary symptoms and no urine culture obtained   - can use warm compress or heating pad over muscles  - follow up as needed      Diagnoses and all orders for this visit:    Flank pain    Other orders  -     doxepin (SINEquan) 25 mg capsule;          Subjective     Chief Complaint: ER follow up for flank pain     HPI:   This is a 63 yo F who presents for ER follow up for flank pain  She reports taking 7 days of antibiotics, she had a negative CT renal study  She still has flank pain 3/10  Today her temp was 99  She denies dysuria and frequency  She reports she drinks lots of water throughout the day  The following portions of the patient's history were reviewed and updated as appropriate: allergies, current medications, past family history, past medical history, past social history, past surgical history and problem list     Review of Systems  Review of Systems   Constitutional: Negative for fatigue and fever  HENT: Negative for congestion and sore throat  Respiratory: Negative for cough and shortness of breath  Cardiovascular: Negative for chest pain and palpitations  Gastrointestinal: Negative for abdominal distention, abdominal pain, constipation, diarrhea and vomiting  Genitourinary: Positive for flank pain  Negative for decreased urine volume, difficulty urinating, frequency and urgency  Musculoskeletal: Negative for back pain  Neurological: Negative for dizziness and headaches  Objective   Vitals:    10/30/18 1456   BP: 150/98   Pulse: 78   Resp: 14   Temp: 100 °F (37 8 °C)       Physical Exam   Constitutional: She appears well-developed and well-nourished  HENT:   Mouth/Throat: Oropharynx is clear and moist    Eyes: Conjunctivae are normal    Neck: Neck supple     Cardiovascular: Normal rate, regular rhythm and normal heart sounds  Exam reveals no friction rub  No murmur heard  Pulmonary/Chest: Effort normal and breath sounds normal  No respiratory distress  She has no wheezes  She has no rales  Abdominal: Soft  Bowel sounds are normal  There is no tenderness  Musculoskeletal:   tender point over right 10th rib    Skin: Skin is warm and dry  Lab Results: I have reviewed all the lab results

## 2018-10-30 NOTE — ASSESSMENT & PLAN NOTE
- likely musculoskeletal, had tender point on posterior rib, improvement with OMT     - she was already treated with keflex for suspected UTI, no urinary symptoms and no urine culture obtained   - can use warm compress or heating pad over muscles  - follow up as needed

## 2018-11-01 ENCOUNTER — HOSPITAL ENCOUNTER (EMERGENCY)
Facility: HOSPITAL | Age: 58
Discharge: HOME | End: 2018-11-02
Attending: EMERGENCY MEDICINE
Payer: COMMERCIAL

## 2018-11-01 VITALS
BODY MASS INDEX: 26.05 KG/M2 | HEART RATE: 70 BPM | SYSTOLIC BLOOD PRESSURE: 170 MMHG | DIASTOLIC BLOOD PRESSURE: 91 MMHG | WEIGHT: 147 LBS | TEMPERATURE: 98.4 F | RESPIRATION RATE: 16 BRPM | OXYGEN SATURATION: 99 % | HEIGHT: 63 IN

## 2018-11-01 DIAGNOSIS — E87.1 HYPONATREMIA: Primary | ICD-10-CM

## 2018-11-01 DIAGNOSIS — R21 RASH: ICD-10-CM

## 2018-11-01 DIAGNOSIS — R10.9 FLANK PAIN: ICD-10-CM

## 2018-11-01 DIAGNOSIS — E87.6 HYPOKALEMIA: ICD-10-CM

## 2018-11-01 LAB
ANION GAP SERPL CALCULATED.3IONS-SCNC: 9 MMOL/L (ref 4–13)
ANION GAP SERPL CALCULATED.3IONS-SCNC: 9 MMOL/L (ref 4–13)
BASOPHILS # BLD AUTO: 0.04 THOUSANDS/ΜL (ref 0–0.1)
BASOPHILS NFR BLD AUTO: 1 % (ref 0–1)
BILIRUB UR QL STRIP: NEGATIVE
BUN SERPL-MCNC: 3 MG/DL (ref 5–25)
BUN SERPL-MCNC: 4 MG/DL (ref 5–25)
CALCIUM SERPL-MCNC: 9.1 MG/DL (ref 8.3–10.1)
CALCIUM SERPL-MCNC: 9.1 MG/DL (ref 8.3–10.1)
CHLORIDE SERPL-SCNC: 88 MMOL/L (ref 100–108)
CHLORIDE SERPL-SCNC: 92 MMOL/L (ref 100–108)
CLARITY UR: CLEAR
CO2 SERPL-SCNC: 25 MMOL/L (ref 21–32)
CO2 SERPL-SCNC: 27 MMOL/L (ref 21–32)
COLOR UR: YELLOW
CREAT SERPL-MCNC: 0.6 MG/DL (ref 0.6–1.3)
CREAT SERPL-MCNC: 0.64 MG/DL (ref 0.6–1.3)
EOSINOPHIL # BLD AUTO: 0.06 THOUSAND/ΜL (ref 0–0.61)
EOSINOPHIL NFR BLD AUTO: 2 % (ref 0–6)
ERYTHROCYTE [DISTWIDTH] IN BLOOD BY AUTOMATED COUNT: 13 % (ref 11.6–15.1)
GFR SERPL CREATININE-BSD FRML MDRD: 101 ML/MIN/1.73SQ M
GFR SERPL CREATININE-BSD FRML MDRD: 99 ML/MIN/1.73SQ M
GLUCOSE SERPL-MCNC: 80 MG/DL (ref 65–140)
GLUCOSE SERPL-MCNC: 91 MG/DL (ref 65–140)
GLUCOSE UR STRIP-MCNC: NEGATIVE MG/DL
HCT VFR BLD AUTO: 34.8 % (ref 34.8–46.1)
HGB BLD-MCNC: 12.2 G/DL (ref 11.5–15.4)
HGB UR QL STRIP.AUTO: NEGATIVE
IMM GRANULOCYTES # BLD AUTO: 0 THOUSAND/UL (ref 0–0.2)
IMM GRANULOCYTES NFR BLD AUTO: 0 % (ref 0–2)
KETONES UR STRIP-MCNC: NEGATIVE MG/DL
LEUKOCYTE ESTERASE UR QL STRIP: NEGATIVE
LYMPHOCYTES # BLD AUTO: 0.71 THOUSANDS/ΜL (ref 0.6–4.47)
LYMPHOCYTES NFR BLD AUTO: 22 % (ref 14–44)
MCH RBC QN AUTO: 28.4 PG (ref 26.8–34.3)
MCHC RBC AUTO-ENTMCNC: 35.1 G/DL (ref 31.4–37.4)
MCV RBC AUTO: 81 FL (ref 82–98)
MONOCYTES # BLD AUTO: 0.42 THOUSAND/ΜL (ref 0.17–1.22)
MONOCYTES NFR BLD AUTO: 13 % (ref 4–12)
NEUTROPHILS # BLD AUTO: 2.03 THOUSANDS/ΜL (ref 1.85–7.62)
NEUTS SEG NFR BLD AUTO: 62 % (ref 43–75)
NITRITE UR QL STRIP: NEGATIVE
NRBC BLD AUTO-RTO: 0 /100 WBCS
PH UR STRIP.AUTO: 7 [PH] (ref 4.5–8)
PLATELET # BLD AUTO: 265 THOUSANDS/UL (ref 149–390)
PMV BLD AUTO: 9.1 FL (ref 8.9–12.7)
POTASSIUM SERPL-SCNC: 2.3 MMOL/L (ref 3.5–5.3)
POTASSIUM SERPL-SCNC: 2.8 MMOL/L (ref 3.5–5.3)
PROT UR STRIP-MCNC: NEGATIVE MG/DL
RBC # BLD AUTO: 4.29 MILLION/UL (ref 3.81–5.12)
SODIUM SERPL-SCNC: 122 MMOL/L (ref 136–145)
SODIUM SERPL-SCNC: 128 MMOL/L (ref 136–145)
SP GR UR STRIP.AUTO: 1 (ref 1–1.03)
UROBILINOGEN UR QL STRIP.AUTO: 0.2 E.U./DL
WBC # BLD AUTO: 3.26 THOUSAND/UL (ref 4.31–10.16)

## 2018-11-01 PROCEDURE — 96374 THER/PROPH/DIAG INJ IV PUSH: CPT

## 2018-11-01 PROCEDURE — 36415 COLL VENOUS BLD VENIPUNCTURE: CPT | Performed by: EMERGENCY MEDICINE

## 2018-11-01 PROCEDURE — 96361 HYDRATE IV INFUSION ADD-ON: CPT

## 2018-11-01 PROCEDURE — 99284 EMERGENCY DEPT VISIT MOD MDM: CPT

## 2018-11-01 PROCEDURE — 96375 TX/PRO/DX INJ NEW DRUG ADDON: CPT

## 2018-11-01 PROCEDURE — 85025 COMPLETE CBC W/AUTO DIFF WBC: CPT | Performed by: EMERGENCY MEDICINE

## 2018-11-01 PROCEDURE — 81003 URINALYSIS AUTO W/O SCOPE: CPT | Performed by: EMERGENCY MEDICINE

## 2018-11-01 PROCEDURE — 93005 ELECTROCARDIOGRAM TRACING: CPT

## 2018-11-01 PROCEDURE — 80048 BASIC METABOLIC PNL TOTAL CA: CPT | Performed by: EMERGENCY MEDICINE

## 2018-11-01 RX ORDER — POTASSIUM CHLORIDE 20 MEQ/1
60 TABLET, EXTENDED RELEASE ORAL ONCE
Status: COMPLETED | OUTPATIENT
Start: 2018-11-01 | End: 2018-11-02

## 2018-11-01 RX ORDER — HYDROXYZINE HYDROCHLORIDE 25 MG/1
25 TABLET, FILM COATED ORAL ONCE
Status: COMPLETED | OUTPATIENT
Start: 2018-11-01 | End: 2018-11-01

## 2018-11-01 RX ORDER — KETOROLAC TROMETHAMINE 30 MG/ML
15 INJECTION, SOLUTION INTRAMUSCULAR; INTRAVENOUS ONCE
Status: COMPLETED | OUTPATIENT
Start: 2018-11-01 | End: 2018-11-01

## 2018-11-01 RX ORDER — ONDANSETRON 2 MG/ML
4 INJECTION INTRAMUSCULAR; INTRAVENOUS ONCE
Status: COMPLETED | OUTPATIENT
Start: 2018-11-01 | End: 2018-11-01

## 2018-11-01 RX ORDER — POTASSIUM CHLORIDE 20 MEQ/1
60 TABLET, EXTENDED RELEASE ORAL ONCE
Status: DISCONTINUED | OUTPATIENT
Start: 2018-11-01 | End: 2018-11-01

## 2018-11-01 RX ADMIN — ONDANSETRON 4 MG: 2 INJECTION INTRAMUSCULAR; INTRAVENOUS at 21:41

## 2018-11-01 RX ADMIN — SODIUM CHLORIDE 1000 ML: 0.9 INJECTION, SOLUTION INTRAVENOUS at 21:42

## 2018-11-01 RX ADMIN — HYDROXYZINE HYDROCHLORIDE 25 MG: 25 TABLET ORAL at 22:39

## 2018-11-01 RX ADMIN — KETOROLAC TROMETHAMINE 15 MG: 30 INJECTION, SOLUTION INTRAMUSCULAR at 21:41

## 2018-11-02 LAB
ATRIAL RATE: 84 BPM
P AXIS: 51 DEGREES
PR INTERVAL: 132 MS
QRS AXIS: 24 DEGREES
QRSD INTERVAL: 86 MS
QT INTERVAL: 340 MS
QTC INTERVAL: 401 MS
T WAVE AXIS: 49 DEGREES
VENTRICULAR RATE: 84 BPM

## 2018-11-02 PROCEDURE — 93010 ELECTROCARDIOGRAM REPORT: CPT | Performed by: INTERNAL MEDICINE

## 2018-11-02 RX ORDER — HYDROXYZINE HYDROCHLORIDE 25 MG/1
25 TABLET, FILM COATED ORAL EVERY 6 HOURS
Qty: 12 TABLET | Refills: 0 | Status: ON HOLD | OUTPATIENT
Start: 2018-11-02 | End: 2019-05-04 | Stop reason: SDDI

## 2018-11-02 RX ORDER — NAPROXEN 500 MG/1
500 TABLET ORAL 2 TIMES DAILY WITH MEALS
Qty: 30 TABLET | Refills: 0 | Status: SHIPPED | OUTPATIENT
Start: 2018-11-02 | End: 2019-05-22 | Stop reason: ALTCHOICE

## 2018-11-02 RX ADMIN — POTASSIUM CHLORIDE 60 MEQ: 1500 TABLET, EXTENDED RELEASE ORAL at 00:01

## 2018-11-02 NOTE — DISCHARGE INSTRUCTIONS
Please fluid restrict yourself to prevent your sodium from dropping  Get your blood work rechecked and follow up with your primary doctor or with our [facility] for further management  You can take the Atarax for itching and naproxen for flank pain  Return to the ER for new or worrisome symptoms  Flank Pain   WHAT YOU NEED TO KNOW:   Flank pain is felt in the area below your ribcage and above your hip bones, often in the lower back  Your pain may be dull or so severe that you cannot get comfortable  The pain may stay in one area or radiate to another area  It may worsen and lighten in waves  Flank pain is often a sign of problems with your urinary tract, such as a kidney stone or infection  DISCHARGE INSTRUCTIONS:   Return to the emergency department if:   · You have a fever  · Your heart is fluttering or jumping  · You see blood in your urine  · Your pain radiates into your lower abdomen and genital area  · You have intense pain in your low back next to your spine  · You are much more tired than usual and have no desire to eat  · You have a headache and your muscles jerk  Contact your healthcare provider if:   · You have an upset stomach and are vomiting  · You have to urinate more often, and with urgency  · Your pain worsens or does not improve, and you cannot get comfortable  · You pass a stone when you urinate  · You have questions or concerns about your condition or care  Medicines: The following medicines may be ordered for you:  · Pain medicine  may help decrease or relieve your pain  Do not wait until the pain is severe before you take your medicine  · Antibiotics  may help treat a urinary tract infection caused by bacteria  · Take your medicine as directed  Contact your healthcare provider if you think your medicine is not helping or if you have side effects  Tell him of her if you are allergic to any medicine   Keep a list of the medicines, vitamins, and herbs you take  Include the amounts, and when and why you take them  Bring the list or the pill bottles to follow-up visits  Carry your medicine list with you in case of an emergency  Follow up with your healthcare provider in 1 to 2 days or as directed:  Write down your questions so you remember to ask them during your visits  © 2017 [address] Information is for End User's use only and may not be sold, redistributed or otherwise used for commercial purposes  All illustrations and images included in CareNotes® are the copyrighted property of [employer]  or [employer]rell[employer]  The above information is an  only  It is not intended as medical advice for individual conditions or treatments  Talk to your doctor, nurse or pharmacist before following any medical regimen to see if it is safe and effective for you  Acute Rash   WHAT YOU NEED TO KNOW:   A rash is irritation, redness, or itchiness in the skin or mucus membranes  Mucus membranes are areas such as the lining of your nose or throat  Acute means the rash starts suddenly, worsens quickly, and lasts a short time  An acute rash may be caused by a disease, such as hepatitis or vasculitis  The rash may be a reaction to something you are allergic to, such as certain foods, or latex  Certain medicines, including antibiotics, NSAIDs, prescription pain medicine, and aspirin can also cause a rash  DISCHARGE INSTRUCTIONS:   Return to the emergency department if:   · You have sudden trouble breathing or chest pain  · You are vomiting, have a headache or muscle aches, and your throat hurts  Contact your healthcare provider if:   · You have a fever  · You get open wounds from scratching your skin, or you have a wound that is red, swollen, or painful  · Your rash lasts longer than 3 months  · You have swelling or pain in your joints       · You have questions or concerns about your condition or care   Medicines:  If your rash does not go away on its own, you may need the following medicines:  · Antihistamines  may be given to help decrease itching  · Steroids  may be given to decrease inflammation  · Antibiotics  help fight or prevent a bacterial infection  · Take your medicine as directed  Contact your healthcare provider if you think your medicine is not helping or if you have side effects  Tell him of her if you are allergic to any medicine  Keep a list of the medicines, vitamins, and herbs you take  Include the amounts, and when and why you take them  Bring the list or the pill bottles to follow-up visits  Carry your medicine list with you in case of an emergency  Prevent a rash or care for your skin when you have a rash:  Dry skin can lead to more problems  Do not scratch your skin if it itches  You may cause a skin infection by scratching  The following may prevent dry skin, and help your skin look better:  · Use thick cream lotions or petroleum jelly to help soothe your rash  These products work well on areas with thick skin, such as your feet  Cool compresses may also be used to soothe your skin  Apply a cool compress or a cool, wet towel, and then cover it with a dry towel  · Use lukewarm water when you bathe  Hot water may damage your skin more  Pat your skin dry  Do not rub your skin with a towel  · Use detergents, soaps, shampoos, and bubble baths made for sensitive skin  Wear clothes that are made of cotton instead of nylon or wool  Cotton is softer, so it will not hurt your skin as much  Follow up with your healthcare provider as directed: You may need to see a dermatologist if healthcare providers do not know what is causing your rash  You may also need to see a dermatologist if your rash does not get better even with treatment  You may need to see a dietitian if you have allergies to foods  Write down your questions so you remember to ask them during your visits    © 2017 [facility] Information is for End User's use only and may not be sold, redistributed or otherwise used for commercial purposes  All illustrations and images included in CareNotes® are the copyrighted property of [employer]  or [address]  The above information is an  only  It is not intended as medical advice for individual conditions or treatments  Talk to your doctor, nurse or pharmacist before following any medical regimen to see if it is safe and effective for you  Hyponatremia   WHAT YOU NEED TO KNOW:   Hyponatremia occurs when the amount of sodium (salt) in your blood is lower than normal  Sodium is an electrolyte (mineral) that helps your muscles, heart, and digestive system work properly  It helps control blood pressure and fluid balance  DISCHARGE INSTRUCTIONS:   Follow up with your healthcare provider as directed: You may need to return for more tests  Write down your questions so you remember to ask them during your visits  Nutrition:  You may need to increase your intake of sodium  Foods that are high in sodium include milk, packaged snacks such as pretzels, or processed meats (barron, sausage, and ham)  Ask your dietitian to help you create a meal plan that is right for you  Liquids: Follow your healthcare provider's advice if you need to limit the amount of liquid you drink  Ask how much liquid to drink each day and which liquids are best for you  You may be asked to drink liquids that have water, sugar, and salt, such as juices, milk, or sports drinks  These liquids help your body hold in fluid and prevent dehydration  Contact your healthcare provider if:   · You have muscle cramps or twitching  · You feel very weak or tired  · You have nausea or are vomiting  · You have questions or concerns about your condition or care  Return to the emergency department if:   · You have a seizure      · You have an irregular heartbeat  · You have trouble breathing  · You cannot move your arms and legs  · You are confused or cannot think clearly  © 2017 [address] Information is for End User's use only and may not be sold, redistributed or otherwise used for commercial purposes  All illustrations and images included in CareNotes® are the copyrighted property of [email or web address]  or Stef Orellana  The above information is an  only  It is not intended as medical advice for individual conditions or treatments  Talk to your doctor, nurse or pharmacist before following any medical regimen to see if it is safe and effective for you  Hydroxyzine (By mouth)   Hydroxyzine Pamoate ([id number]-[id number] BHAVNA-oh-ate)  Treats anxiety, nausea, vomiting, allergies, skin rash, hives, and itching  May also be used with anesthesia for medical procedures  Brand Name(s): Vistaril   There may be other brand names for this medicine  When This Medicine Should Not Be Used: This medicine is not right for everyone  Do not use it if you had an allergic reaction to hydroxyzine, cetirizine, or levocetirizine  Do not use it during the early part of pregnancy or if you have prolonged QT interval   How to Use This Medicine:   Capsule, Liquid, Tablet  · Your doctor will tell you how much medicine to use  Do not use more than directed  · Oral liquid: Measure the oral liquid medicine with a marked measuring spoon, oral syringe, or medicine cup  Shake well just before use  · Tablet: Swallow whole  Do not break, crush, or chew it  · Missed dose: Take a dose as soon as you remember  If it is almost time for your next dose, wait until then and take a regular dose  Do not take extra medicine to make up for a missed dose  · Store the medicine in a closed container at room temperature, away from heat, moisture, and direct light    Drugs and Foods to Avoid:   Ask your doctor or pharmacist before using any other medicine, including over-the-counter medicines, vitamins, and herbal products  · Some medicines can affect how hydroxyzine works  Tell your doctor if you are using any of the following:  ¨ Droperidol, methadone, ondansetron, pentamidine  ¨ Antibiotic (including azithromycin, clarithromycin, erythromycin, gatifloxacin, moxifloxacin)  ¨ Medicine to treat depression (including citalopram, fluoxetine)  ¨ Medicine to treat heart rhythm problems (including amiodarone, procainamide, quinidine, sotalol)  ¨ Medicine to treat mental health problems (including chlorpromazine, clozapine, iloperidone, quetiapine, ziprasidone)  · Tell your doctor if you use anything else that makes you sleepy  Some examples are allergy medicine, narcotic pain medicine, and alcohol  Warnings While Using This Medicine:   · Tell your doctor if you are pregnant or breastfeeding, or if you have heart disease, heart failure, a slow heartbeat, skin problems, or had a recent heart attack  · This medicine may cause the following problems:  ¨ Changes in your heart rhythm  ¨ Serious skin reaction called acute generalized exanthematous pustulosis (AGEP)  · This medicine may make you drowsy  Do not drive or do anything that could be dangerous until you know how this medicine affects you  · Call your doctor if your symptoms do not improve or if they get worse  · Keep all medicine out of the reach of children  Never share your medicine with anyone    Possible Side Effects While Using This Medicine:   Call your doctor right away if you notice any of these side effects:  · Allergic reaction: Itching or hives, swelling in your face or hands, swelling or tingling in your mouth or throat, chest tightness, trouble breathing  · Fainting, dizziness, lightheadedness  · Fast, pounding, or uneven heartbeat  · Fever, skin rash  · Severe tiredness  If you notice these less serious side effects, talk with your doctor:   · Drowsiness, sleepiness  · Dry mouth  If you notice other side effects that you think are caused by this medicine, tell your doctor  Call your doctor for medical advice about side effects  You may report side effects to FDA at 4-458-FDA-3764  © 2017 [address] Information is for End User's use only and may not be sold, redistributed or otherwise used for commercial purposes  The above information is an  only  It is not intended as medical advice for individual conditions or treatments  Talk to your doctor, nurse or pharmacist before following any medical regimen to see if it is safe and effective for you  Naproxen (By mouth)   Naproxen (na-PROX-en)  Treats fever and pain  Also treats arthritis, gout, and menstrual cramps or pain  This medicine is an NSAID  Brand Name(s): Aleve, Aleve Arthritis, All Day Pain Relief, All Day Relief, Anaprox, Anaprox DS, EC Naprosyn, Flanax Pain Relief Kit, [facility] All Day Pain Relief, Good Sense All Day Pain Relief, Good Sense Naproxen Sodium, Leader All Day Pain Relief, Mediproxen, Naprelan, NaproPax   There may be other brand names for this medicine  When This Medicine Should Not Be Used: This medicine is not right for everyone  Do not use it if you had an allergic reaction (including asthma) to naproxen, aspirin, or other NSAIDs  Do not use it if you have had a heart surgery (such as coronary artery bypass graft)  How to Use This Medicine:   Liquid Filled Capsule, Liquid, Tablet, Coated Tablet, Long Acting Tablet  · Your doctor will tell you how much medicine to use  Do not use more than directed  · Take this medicine with food or milk so it does not upset your stomach  Drink a full glass of water after each dose  · Delayed-release tablet: Swallow whole  Do not crush, break, or chew it  · Oral liquid: Shake well just before you measure the dose  Measure the oral liquid medicine with a marked measuring spoon, oral syringe, or medicine cup    · Follow the instructions on the medicine label if you are using this medicine without a prescription  · This medicine should come with a Medication Guide  Ask your pharmacist for a copy if you do not have one  · Missed dose: Take a dose as soon as you remember  If it is almost time for your next dose, wait until then and take a regular dose  Do not take extra medicine to make up for a missed dose  · Store the medicine in a closed container at room temperature, away from heat, moisture, and direct light  Oral liquid: Do not freeze  Drugs and Foods to Avoid:   Ask your doctor or pharmacist before using any other medicine, including over-the-counter medicines, vitamins, and herbal products  · Do not use any other NSAID medicine unless your doctor says it is okay  Some other NSAIDs are aspirin, celecoxib, diclofenac, diflunisal, ibuprofen, or salsalate  · Some medicines can affect how naproxen works  Tell your doctor if you are using any of the following:  ¨ Cholestyramine, cyclosporine, digoxin, lithium, methotrexate, probenecid, sucralfate  ¨ Antacids  ¨ Blood pressure medicine  ¨ Blood thinner (including warfarin)  ¨ Diuretic (water pill)  ¨ Medicine to treat depression  ¨ Steroid medicine  · Do not drink alcohol while you are using this medicine  Warnings While Using This Medicine:   · Tell your doctor if you are pregnant or breastfeeding  Do not use this medicine during the later part of a pregnancy, unless your doctor tells you to  · Tell your doctor if you have kidney disease, liver disease, anemia, asthma, bleeding problems, high blood pressure, heart failure, a recent heart attack, or a history of stomach or bowel problems (including ulcers or bleeding)  Tell your doctor if you smoke or drink alcohol    · This medicine may cause the following problems:  ¨ Higher risk of blood clots, heart attack, stroke, or heart failure  ¨ Bleeding and ulcers in the stomach or intestines  ¨ Liver damage  ¨ Kidney damage  ¨ High potassium levels in the blood  ¨ Serious skin reactions  · Call your doctor if symptoms get worse, pain lasts more than 10 days, or fever lasts more than 3 days  · Tell any doctor or dentist who treats that you are using this medicine, especially if you have surgery or a procedure  · This medicine may make you dizzy or drowsy  Do not drive or do anything else that could be dangerous until you know how this medicine affects you  · Ovulation may be delayed in some women while this medicine is being used  Talk to your doctor if you have concerns about this  · Tell any doctor or dentist who treats you that you are using this medicine  This medicine may affect certain medical test results  · Your doctor will do lab tests at regular visits to check on the effects of this medicine  Keep all appointments  · Keep all medicine out of the reach of children  Never share your medicine with anyone    Possible Side Effects While Using This Medicine:   Call your doctor right away if you notice any of these side effects:  · Allergic reaction: Itching or hives, swelling in your face or hands, swelling or tingling in your mouth or throat, chest tightness, trouble breathing  · Blistering, peeling, red skin rash  · Bloody or black, tarry stools, severe stomach pain, vomiting blood or something that looks like coffee grounds  · Change in how much or how often you urinate  · Chest pain that may spread, trouble breathing, unusual sweating, fainting  · Dark urine or pale stools, nausea, vomiting, loss of appetite, stomach pain, yellow skin or eyes  · Numbness or weakness on one side of your body, sudden or severe headache, problems with vision, speech, or walking  · Rapid weight gain, swelling in your hands, ankles, or feet  · Unusual bleeding, bruising, or weakness  · Vision changes  If you notice these less serious side effects, talk with your doctor:   · Mild nausea, diarrhea, or constipation  · Ringing in your ears, dizziness, headache  If you notice other side effects that you think are caused by this medicine, tell your doctor  Call your doctor for medical advice about side effects  You may report side effects to FDA at 6-960-FDA-5702  © 2017 [address] Information is for End User's use only and may not be sold, redistributed or otherwise used for commercial purposes  The above information is an  only  It is not intended as medical advice for individual conditions or treatments  Talk to your doctor, nurse or pharmacist before following any medical regimen to see if it is safe and effective for you

## 2018-11-02 NOTE — ED ATTENDING ATTESTATION
Judith Stewart MD, saw and evaluated the patient  I have discussed the patient with the resident/non-physician practitioner and agree with the resident's/non-physician practitioner's findings, Plan of Care, and MDM as documented in the resident's/non-physician practitioner's note, except where noted  All available labs and Radiology studies were reviewed  At this point I agree with the current assessment done in the Emergency Department  I have conducted an independent evaluation of this patient a history and physical is as follows:  Patient has been having flank pain this pain is worse with movement  There is no radiation to the legs there is no numbness no weakness no bowel or bladder difficulties this has been coming on increasingly more frequently patient has been seen and evaluated received CT scan which ruled her out for kidney stone  Patient was being treated for urinary tract infection but no culture is seen  No fevers no chills no real dysuria  PE alert heart regular lungs clear abdomen soft tender lumbar low sacral area paravertebrally neuro motor 5/5 sensation within normal limits DTRs 2+ MDM:  Will check electrolytes will check urine will treat musculoskeletal back pain      Critical Care Time  CritCare Time    Procedures

## 2018-11-02 NOTE — ED PROVIDER NOTES
History  Chief Complaint   Patient presents with    Flank Pain     Pt c/o back/flank pain  Pt states the pain started yesterday  Per EMS, pt had uti that she was taking medication for     62year-old woman presents for evaluation of flank pain  Patient reports a 1-week history of bilateral flank pain worse on the right  She was evaluated 1 week ago and was diagnosed with pyelonephritis  She had dysuria and foul-smelling urine at that time  She finished a course of Keflex with resolution of urinary symptoms and temporary relief of flank pain  Patient was evaluated by her PCP yesterday and diagnosed with MSK back pain  Patient presents today with persistent symptoms  No fevers, N/V/D, or urinary symptoms  CT renal stone study 1 week ago negative  She also reports feeling intermittently lightheaded today while at the store without syncope  Patient also complaining of a new pruritic rash around her neck with recent exposure to what she thinks are fleas  No history of cardiac disease or VTE  Will check basic labs and urine dip  No culture results available from previous visit  Will administer IV fluids, Toradol, and reassess  Prior to Admission Medications   Prescriptions Last Dose Informant Patient Reported? Taking?    LORazepam (ATIVAN) 2 mg tablet  Self No No   Sig: Take 1 tablet (2 mg total) by mouth 2 (two) times a day as needed for anxiety for up to 4 days   Patient taking differently: Take 1 mg by mouth 3 (three) times a day     PARoxetine (PAXIL) 30 mg tablet  Self Yes Yes   Sig: Take 30 mg by mouth daily     QUEtiapine (SEROquel) 400 MG tablet   No Yes   Sig: Take 1 tablet (400 mg total) by mouth daily at bedtime for 30 days   calcium carbonate-vitamin D (OSCAL-D) 500 mg-200 units per tablet  Self No Yes   Sig: TAKE 1 TABLET BY MOUTH 2 (TWO) TIMES A DAY WITH MEALS FOR 30 DAYS   ferrous sulfate 325 (65 Fe) mg tablet  Self No Yes   Sig: TAKE 1 TABLET (325 MG TOTAL) BY MOUTH 2 (TWO) TIMES A DAY FOR 90 DAYS   levothyroxine 25 mcg tablet Not Taking at Unknown time Self Yes No   Sig: Take 25 mcg by mouth daily  omeprazole (PriLOSEC) 40 MG capsule  Self Yes Yes   Sig: Take 80 mg by mouth 2 (two) times a day        Facility-Administered Medications: None       Past Medical History:   Diagnosis Date    Anxiety     Back pain at L4-L5 level     Schreiber esophagus     Bulimia     Disease of thyroid gland     hypothyroid    GERD (gastroesophageal reflux disease)     Hyperlipidemia     Hypothyroidism     Leukopenia     Sciatica     Seizure (HCC)     Synovial cyst of lumbar spine     Vertigo     Weight gain        Past Surgical History:   Procedure Laterality Date    BONE MARROW BIOPSY      BREAST SURGERY      enlargement     RHINOPLASTY         Family History   Problem Relation Age of Onset    Uterine cancer Mother     Esophageal cancer Father     Colon cancer Maternal Grandmother      I have reviewed and agree with the history as documented  Social History   Substance Use Topics    Smoking status: Never Smoker    Smokeless tobacco: Never Used    Alcohol use No        Review of Systems   Constitutional: Negative for chills and fever  HENT: Negative for rhinorrhea and sore throat  Eyes: Negative for photophobia and visual disturbance  Respiratory: Negative for cough and shortness of breath  Cardiovascular: Negative for chest pain and leg swelling  Gastrointestinal: Negative for abdominal pain, diarrhea, nausea and vomiting  Genitourinary: Positive for flank pain  Negative for dysuria, frequency and hematuria  Musculoskeletal: Negative for back pain and neck pain  Skin: Positive for rash  Negative for wound  Neurological: Positive for light-headedness  Negative for syncope and headaches         Physical Exam  ED Triage Vitals [11/01/18 2036]   Temperature Pulse Respirations Blood Pressure SpO2   98 4 °F (36 9 °C) 90 16 159/94 100 %      Temp Source Heart Rate Source Patient Position - Orthostatic VS BP Location FiO2 (%)   Oral Monitor Lying Right arm --      Pain Score       8           Orthostatic Vital Signs  Vitals:    11/01/18 2036 11/01/18 2115 11/01/18 2237 11/01/18 2330   BP: 159/94 136/80 141/86 170/91   Pulse: 90 86 87 70   Patient Position - Orthostatic VS: Lying   Lying       Physical Exam   Constitutional: She is oriented to person, place, and time  She appears well-developed and well-nourished  No distress  HENT:   Head: Normocephalic and atraumatic  Eyes: Pupils are equal, round, and reactive to light  Conjunctivae and EOM are normal  No scleral icterus  Neck: Normal range of motion  Neck supple  Cardiovascular: Normal rate, regular rhythm and normal heart sounds  Exam reveals no gallop and no friction rub  No murmur heard  Pulmonary/Chest: Effort normal and breath sounds normal  No respiratory distress  She has no wheezes  She has no rales  Abdominal: Soft  She exhibits no distension  There is no tenderness  There is no rebound and no guarding  Musculoskeletal: She exhibits tenderness (Mild tenderness to the right paraspinal musculature, no midline tenderness, no CVA tenderness, no skin changes)  She exhibits no edema  Neurological: She is alert and oriented to person, place, and time  No cranial nerve deficit  GCS 15, no gross motor or sensory deficits, no cerebellar signs, visual fields intact, normal rapid alternating movements   Skin: Skin is warm and dry  Rash (Erythematous papular rash to anterior neck) noted  She is not diaphoretic  Psychiatric:   No SI, HI, or AVH   Vitals reviewed        ED Medications  Medications   sodium chloride 0 9 % bolus 1,000 mL (0 mL Intravenous Stopped 11/1/18 2318)   ketorolac (TORADOL) injection 15 mg (15 mg Intravenous Given 11/1/18 2141)   ondansetron (ZOFRAN) injection 4 mg (4 mg Intravenous Given 11/1/18 2141)   hydrOXYzine HCL (ATARAX) tablet 25 mg (25 mg Oral Given 11/1/18 2239)   potassium chloride (K-DUR,KLOR-CON) CR tablet 60 mEq (60 mEq Oral Given 11/2/18 0001)       Diagnostic Studies  Results Reviewed     Procedure Component Value Units Date/Time    Basic metabolic panel [[id number]]  (Abnormal) Collected:  11/01/18 2236    Lab Status:  Final result Specimen:  Blood from Hand, Right Updated:  11/01/18 2305     Sodium 128 (L) mmol/L      Potassium 2 8 (L) mmol/L      Chloride 92 (L) mmol/L      CO2 27 mmol/L      ANION GAP 9 mmol/L      BUN 3 (L) mg/dL      Creatinine 0 60 mg/dL      Glucose 91 mg/dL      Calcium 9 1 mg/dL      eGFR 101 ml/min/1 73sq m     Narrative:         National Kidney Disease Education Program recommendations are as follows:  GFR calculation is accurate only with a steady state creatinine  Chronic Kidney disease less than 60 ml/min/1 73 sq  meters  Kidney failure less than 15 ml/min/1 73 sq  meters      UA w Reflex to Microscopic w Reflex to Culture [[id number]]  (Abnormal) Collected:  11/01/18 2215    Lab Status:  Final result Specimen:  Urine from Urine, Clean Catch Updated:  11/01/18 2231     Color, UA Yellow     Clarity, UA Clear     Specific Gravity, UA 1 000 (L)     pH, UA 7 0     Leukocytes, UA Negative     Nitrite, UA Negative     Protein, UA Negative mg/dl      Glucose, UA Negative mg/dl      Ketones, UA Negative mg/dl      Urobilinogen, UA 0 2 E U /dl      Bilirubin, UA Negative     Blood, UA Negative    Basic metabolic panel [[id number]]  (Abnormal) Collected:  11/01/18 2139    Lab Status:  Final result Specimen:  Blood from Arm, Left Updated:  11/01/18 2222     Sodium 122 (L) mmol/L      Potassium 2 3 (LL) mmol/L      Chloride 88 (L) mmol/L      CO2 25 mmol/L      ANION GAP 9 mmol/L      BUN 4 (L) mg/dL      Creatinine 0 64 mg/dL      Glucose 80 mg/dL      Calcium 9 1 mg/dL      eGFR 99 ml/min/1 73sq m     Narrative:         National Kidney Disease Education Program recommendations are as follows:  GFR calculation is accurate only with a steady state creatinine  Chronic Kidney disease less than 60 ml/min/1 73 sq  meters  Kidney failure less than 15 ml/min/1 73 sq  meters  CBC and differential [[id number]]  (Abnormal) Collected:  11/01/18 2139    Lab Status:  Final result Specimen:  Blood from Arm, Left Updated:  11/01/18 2201     WBC 3 26 (L) Thousand/uL      RBC 4 29 Million/uL      Hemoglobin 12 2 g/dL      Hematocrit 34 8 %      MCV 81 (L) fL      MCH 28 4 pg      MCHC 35 1 g/dL      RDW 13 0 %      MPV 9 1 fL      Platelets 067 Thousands/uL      nRBC 0 /100 WBCs      Neutrophils Relative 62 %      Immat GRANS % 0 %      Lymphocytes Relative 22 %      Monocytes Relative 13 (H) %      Eosinophils Relative 2 %      Basophils Relative 1 %      Neutrophils Absolute 2 03 Thousands/µL      Immature Grans Absolute 0 00 Thousand/uL      Lymphocytes Absolute 0 71 Thousands/µL      Monocytes Absolute 0 42 Thousand/µL      Eosinophils Absolute 0 06 Thousand/µL      Basophils Absolute 0 04 Thousands/µL                  No orders to display         Procedures  Procedures      Phone Consults  ED Phone Contact    ED Course                               MDM  Number of Diagnoses or Management Options  Flank pain:   Hypokalemia:   Hyponatremia:   Rash:   Diagnosis management comments: Urine dip unremarkable  Laboratory evaluation showed hyponatremia and hypokalemia, the degree of which was falsely low secondary to lab error  Patient without seizure activity or altered mental status  Remainder of lab workup and EKG unremarkable  Patient with significant symptomatic relief after IV fluids, Toradol, and Atarax  She was counseled regarding need for free water restriction given slowly worsening hyponatremia and 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  Patient was discharged with repeat BMP in 48 hrs, Atarax, outpatient follow up, and strict return precautions for worsening symptoms        CritCare Time    Disposition  Final diagnoses:   Hyponatremia   Hypokalemia Flank pain   Rash     Time reflects when diagnosis was documented in both MDM as applicable and the Disposition within this note     Time User Action Codes Description Comment    11/2/2018 12:05 AM Wolf Mcgregor [E87 1] Hyponatremia     11/2/2018 12:05 AM Wolf Mcgregor [E87 6] Hypokalemia     11/2/2018 12:05 AM Jacinda Mcgregor Add [R10 9] Flank pain     11/2/2018 12:06 AM Jacinda Mcgregor Add [R21] Rash       ED Disposition     ED Disposition Condition Comment    Discharge  Lucero Plascencia discharge to home/self care      Condition at discharge: Good        Follow-up Information     Follow up With Specialties Details Why Contact Info Additional Information    [address] [facility]  Schedule an appointment as soon as possible for a visit in 2 days  [address] [facility] [address] [facility] [id number] [address] [facility] Emergency Department Emergency Medicine  As needed [address]  [phone] [address] [place] [address] ED, [address], [facility]          Discharge Medication List as of 11/2/2018 12:08 AM      START taking these medications    Details   hydrOXYzine HCL (ATARAX) 25 mg tablet Take 1 tablet (25 mg total) by mouth every 6 (six) hours, Starting Fri 11/2/2018, Print      naproxen (NAPROSYN) 500 mg tablet Take 1 tablet (500 mg total) by mouth 2 (two) times a day with meals for 7 days, Starting Fri 11/2/2018, Until Fri 11/9/2018, Print         CONTINUE these medications which have NOT CHANGED    Details   calcium carbonate-vitamin D (OSCAL-D) 500 mg-200 units per tablet TAKE 1 TABLET BY MOUTH 2 (TWO) TIMES A DAY WITH MEALS FOR 30 DAYS, Starting Mon 10/1/2018, Until Thu 11/1/2018, Normal      ferrous sulfate 325 (65 Fe) mg tablet TAKE 1 TABLET (325 MG TOTAL) BY MOUTH 2 (TWO) TIMES A DAY FOR 90 DAYS, Starting Tue 9/25/2018, Until Mon 12/24/2018, Normal      omeprazole (PriLOSEC) 40 MG capsule Take 80 mg by mouth 2 (two) times a day  , Historical Med      PARoxetine (PAXIL) 30 mg tablet Take 30 mg by mouth daily  , Starting Tue 9/25/2018, Historical Med      QUEtiapine (SEROquel) 400 MG tablet Take 1 tablet (400 mg total) by mouth daily at bedtime for 30 days, Starting Fri 9/14/2018, Until Thu 11/1/2018, Print      levothyroxine 25 mcg tablet Take 25 mcg by mouth daily  , Historical Med      LORazepam (ATIVAN) 2 mg tablet Take 1 tablet (2 mg total) by mouth 2 (two) times a day as needed for anxiety for up to 4 days, Starting Mon 9/10/2018, Until Tue 10/23/2018, Print             Outpatient Discharge Orders  Basic metabolic panel   Standing Status: Future  Standing Exp  Date: 11/02/19         ED Provider  Attending physically available and evaluated Sharyle June I managed the patient along with the ED Attending      Electronically Signed by         Katharine Ramirez MD  11/02/18 0407

## 2018-11-08 ENCOUNTER — OFFICE VISIT (OUTPATIENT)
Dept: FAMILY MEDICINE CLINIC | Facility: CLINIC | Age: 58
End: 2018-11-08
Payer: COMMERCIAL

## 2018-11-08 VITALS
BODY MASS INDEX: 26.15 KG/M2 | DIASTOLIC BLOOD PRESSURE: 80 MMHG | SYSTOLIC BLOOD PRESSURE: 140 MMHG | RESPIRATION RATE: 16 BRPM | WEIGHT: 147.6 LBS | TEMPERATURE: 98.4 F | HEART RATE: 78 BPM | HEIGHT: 63 IN

## 2018-11-08 DIAGNOSIS — L23.9 DERMAL HYPERSENSITIVITY REACTION: Primary | ICD-10-CM

## 2018-11-08 PROCEDURE — 99213 OFFICE O/P EST LOW 20 MIN: CPT | Performed by: FAMILY MEDICINE

## 2018-11-08 RX ORDER — LORATADINE 10 MG/1
10 TABLET ORAL DAILY
Qty: 90 TABLET | Refills: 3 | Status: SHIPPED | OUTPATIENT
Start: 2018-11-08 | End: 2019-05-22 | Stop reason: ALTCHOICE

## 2018-11-08 RX ORDER — TRIAMCINOLONE ACETONIDE 1 MG/G
CREAM TOPICAL 2 TIMES DAILY
Qty: 80 G | Refills: 2 | Status: ON HOLD | OUTPATIENT
Start: 2018-11-08 | End: 2019-05-04 | Stop reason: ALTCHOICE

## 2018-11-08 NOTE — ASSESSMENT & PLAN NOTE
Steroid cream and antihistamine sent to pharmacy  Recommend emollient cream as well to help prevent excoriation

## 2018-11-08 NOTE — PROGRESS NOTES
Assessment/Plan:    No problem-specific Assessment & Plan notes found for this encounter  Diagnoses and all orders for this visit:    Dermal hypersensitivity reaction  -     triamcinolone (KENALOG) 0 1 % cream; Apply topically 2 (two) times a day  -     loratadine (CLARITIN) 10 mg tablet; Take 1 tablet (10 mg total) by mouth daily          Subjective:      Patient ID: Neymar Sharma is a 62 y o  female  Chief Complaint   Patient presents with    Follow-up     rash        HPI  C/o rash for 2 months on upper chest and legs  Has seen derm at [facility] and diagnosed with dermatologic hypersensitivity  Has had allergy testing and "everything lit up"  Usually is controlled with triamcinolone 0 1% cream and Claritin - is out of these meds and requests refill  The following portions of the patient's history were reviewed and updated as appropriate: allergies, current medications, past family history, past medical history, past social history, past surgical history and problem list     Review of Systems   Constitutional: Negative for activity change, appetite change and fever  Respiratory: Negative for cough, chest tightness and shortness of breath  Cardiovascular: Negative  Skin: Positive for rash  Objective:      /80   Pulse 78   Temp 98 4 °F (36 9 °C)   Resp 16   Ht 5' 3" (1 6 m)   Wt 67 kg (147 lb 9 6 oz)   BMI 26 15 kg/m²          Physical Exam   Constitutional: She appears well-developed and well-nourished  Skin: Skin is warm and dry  Rash noted  Upper chest with diffuse erythematous papular lesions with excoriation marks  Lower extremities with healing excoriated lesions    Vitals reviewed

## 2018-11-14 ENCOUNTER — TELEPHONE (OUTPATIENT)
Dept: FAMILY MEDICINE CLINIC | Facility: CLINIC | Age: 58
End: 2018-11-14
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
## 2018-11-20 ENCOUNTER — PATIENT OUTREACH (OUTPATIENT)
Dept: FAMILY MEDICINE CLINIC | Facility: CLINIC | Age: 58
End: 2018-11-20

## 2018-11-20 DIAGNOSIS — Z78.9 NEED FOR FOLLOW-UP BY SOCIAL WORKER: Primary | ICD-10-CM
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
## 2018-11-28 ENCOUNTER — OFFICE VISIT (OUTPATIENT)
Dept: PAIN MEDICINE | Facility: CLINIC | Age: 58
End: 2018-11-28
Payer: COMMERCIAL

## 2018-11-28 ENCOUNTER — OFFICE VISIT (OUTPATIENT)
Dept: FAMILY MEDICINE CLINIC | Facility: CLINIC | Age: 58
End: 2018-11-28
Payer: COMMERCIAL

## 2018-11-28 VITALS
BODY MASS INDEX: 26.05 KG/M2 | DIASTOLIC BLOOD PRESSURE: 80 MMHG | HEIGHT: 63 IN | WEIGHT: 147 LBS | HEART RATE: 83 BPM | SYSTOLIC BLOOD PRESSURE: 114 MMHG

## 2018-11-28 VITALS
DIASTOLIC BLOOD PRESSURE: 84 MMHG | HEART RATE: 70 BPM | SYSTOLIC BLOOD PRESSURE: 128 MMHG | HEIGHT: 63 IN | WEIGHT: 147 LBS | RESPIRATION RATE: 16 BRPM | TEMPERATURE: 97.9 F | BODY MASS INDEX: 26.05 KG/M2

## 2018-11-28 DIAGNOSIS — R82.90 CLOUDY URINE: Primary | ICD-10-CM

## 2018-11-28 DIAGNOSIS — M43.16 SPONDYLOLISTHESIS AT L4-L5 LEVEL: ICD-10-CM

## 2018-11-28 DIAGNOSIS — M48.062 SPINAL STENOSIS OF LUMBAR REGION WITH NEUROGENIC CLAUDICATION: ICD-10-CM

## 2018-11-28 DIAGNOSIS — M47.816 LUMBAR SPONDYLOSIS: Primary | ICD-10-CM

## 2018-11-28 DIAGNOSIS — S22.080A T12 COMPRESSION FRACTURE (HCC): ICD-10-CM

## 2018-11-28 DIAGNOSIS — M54.16 LUMBAR RADICULOPATHY: ICD-10-CM

## 2018-11-28 DIAGNOSIS — M51.36 DDD (DEGENERATIVE DISC DISEASE), LUMBAR: ICD-10-CM

## 2018-11-28 DIAGNOSIS — R30.0 DYSURIA: ICD-10-CM

## 2018-11-28 LAB
SL AMB  POCT GLUCOSE, UA: NEGATIVE
SL AMB LEUKOCYTE ESTERASE,UA: NEGATIVE
SL AMB POCT BILIRUBIN,UA: NEGATIVE
SL AMB POCT BLOOD,UA: NEGATIVE
SL AMB POCT CLARITY,UA: CLEAR
SL AMB POCT COLOR,UA: YELLOW
SL AMB POCT KETONES,UA: NEGATIVE
SL AMB POCT NITRITE,UA: NEGATIVE
SL AMB POCT PH,UA: 6.5
SL AMB POCT SPECIFIC GRAVITY,UA: 1
SL AMB POCT URINE PROTEIN: NEGATIVE
SL AMB POCT UROBILINOGEN: 0.2

## 2018-11-28 PROCEDURE — 99213 OFFICE O/P EST LOW 20 MIN: CPT | Performed by: FAMILY MEDICINE

## 2018-11-28 PROCEDURE — 3008F BODY MASS INDEX DOCD: CPT | Performed by: FAMILY MEDICINE

## 2018-11-28 PROCEDURE — 81003 URINALYSIS AUTO W/O SCOPE: CPT | Performed by: FAMILY MEDICINE

## 2018-11-28 PROCEDURE — 99213 OFFICE O/P EST LOW 20 MIN: CPT | Performed by: NURSE PRACTITIONER

## 2018-11-28 PROCEDURE — 1036F TOBACCO NON-USER: CPT | Performed by: FAMILY MEDICINE

## 2018-11-28 RX ORDER — PHENAZOPYRIDINE HYDROCHLORIDE 100 MG/1
100 TABLET, FILM COATED ORAL 3 TIMES DAILY PRN
Qty: 6 TABLET | Refills: 0 | Status: SHIPPED | OUTPATIENT
Start: 2018-11-28 | End: 2018-11-30

## 2018-11-28 RX ORDER — ZIPRASIDONE HYDROCHLORIDE 80 MG/1
80 CAPSULE ORAL 2 TIMES DAILY
Refills: 5 | COMMUNITY
Start: 2018-10-28 | End: 2019-05-22 | Stop reason: SDUPTHER

## 2018-11-28 NOTE — ASSESSMENT & PLAN NOTE
Dysuria for roughly 3 months after catheterization  No fevers, no recent antibiotic treatment, UA historical at current shows no sign of UTI   -advised patient to maintain adequate fluid intake  -advised patient to go to the emergency room or call the office if she develops fever,  bloody urine or great increasing pain  -phenazopyridine 100mg TID after meals for bladder spasm

## 2018-11-28 NOTE — PROGRESS NOTES
Assessment:  1  Lumbar spondylosis    2  Lumbar radiculopathy    3  DDD (degenerative disc disease), lumbar    4  Spondylolisthesis at L4-L5 level    5  T12 compression fracture ([address] )    6  Spinal stenosis of lumbar region with neurogenic claudication        Plan:  1  Patient continues with ongoing relief from bilateral L3-5 radiofrequency ablation  I explained that if the pain were to return, we can always repeat this procedure every 6-9 months as needed  The patient was agreeable and verbalized an understanding  2   The patient may continue with Tylenol p r n  and should not exceed more than 3000 mg daily  3   Patient will trial over-the-counter topical analgesics such as 4% lidocaine patches 12 hours on and 12 hours off, Aspercreme with lidocaine, Biofreeze and Mak-Bean as well as topical heat and ice application for her occasional low back pain  4   Patient does not complain of radicular symptoms at today's office visit, however if she were to experience radicular symptoms, can consider bilateral L4 TFESI in the future  5  Patient will continue with her home exercise program  6  At this time, the patient wishes to follow up on an as-needed basis  The patient was advised to contact the office should their symptoms worsen in the interim  The patient was agreeable and verbalized an understanding  [place] Prescription Drug Monitoring Program report was reviewed and was appropriate     History of Present Illness: The patient is a 62 y o  female last seen on 6/15/18 who presents for a follow up office visit in regards to chronic axial pain secondary to lumbar spondylosis  The patient currently reports that she continues to experience ongoing relief from bilateral L3-5 radiofrequency ablation with Dr Nikko Quintana  She did recently have a flare back pain secondary to lifting heavy objects, however it has been self-limiting    She was experiencing occasional radiation into the buttocks and numbness in the left foot, however she denies the symptoms at today's office visit  She denies any bowel or bladder incontinence or saddle anesthesia  MRI of the lumbar spine reveals a stable compression fracture of superior endplate of U55  She has degenerative disc disease and spondylosis at L4-5 and L5-S1  There is also grade 1 anterolisthesis of L4 on L5 with a right-sided facet cyst at this level  She does have associated central and foraminal stenosis at this level  The patient was evaluated by Dr Michael Quan of Orthopedic surgery, however the patient elected to trial conservative measures and not pursue surgical intervention at that time  The patient currently rates her pain as 0/10 on the numeric pain rating scale  The pain does occasionally flare to 7/10, however like previously stated, it is self-limiting  She states the pain is occasional in nature most bothersome in the evening  She characterizes the pain as dull aching  She has not tried over the counter topical analgesics or heat or ice application for her low back pain  She did try meloxicam, however discontinued use secondary to side effects  Current pain medications includes: Tylenol PRN and naproxen PRN  The patient reports that this regimen is providing 90% pain relief  The patient is reporting no side effects from this pain medication regimen  I have personally reviewed and/or updated the patient's past medical history, past surgical history, family history, social history, current medications, allergies, and vital signs today  Review of Systems:    Review of Systems   Respiratory: Negative for shortness of breath  Cardiovascular: Negative for chest pain  Gastrointestinal: Negative for constipation, diarrhea, nausea and vomiting  Musculoskeletal: Positive for gait problem (Difficulty walking)  Negative for arthralgias, joint swelling and myalgias  Skin: Positive for rash  Neurological: Negative for dizziness, seizures and weakness  All other systems reviewed and are negative  Past Medical History:   Diagnosis Date    Anxiety     Back pain at L4-L5 level     Schreiber esophagus     Bulimia     Disease of thyroid gland     hypothyroid    GERD (gastroesophageal reflux disease)     Hyperlipidemia     Hypothyroidism     Leukopenia     Sciatica     Seizure (HCC)     Synovial cyst of lumbar spine     Vertigo     Weight gain        Past Surgical History:   Procedure Laterality Date    BONE MARROW BIOPSY      BREAST SURGERY      enlargement     RHINOPLASTY         Family History   Problem Relation Age of Onset    Uterine cancer Mother     Esophageal cancer Father     Colon cancer Maternal Grandmother        Social History     Occupational History    Not on file  Social History Main Topics    Smoking status: Never Smoker    Smokeless tobacco: Never Used    Alcohol use No    Drug use: No    Sexual activity: Not on file         Current Outpatient Prescriptions:     ferrous sulfate 325 (65 Fe) mg tablet, TAKE 1 TABLET (325 MG TOTAL) BY MOUTH 2 (TWO) TIMES A DAY FOR 90 DAYS, Disp: 180 tablet, Rfl: 0    hydrOXYzine HCL (ATARAX) 25 mg tablet, Take 1 tablet (25 mg total) by mouth every 6 (six) hours, Disp: 12 tablet, Rfl: 0    levothyroxine 25 mcg tablet, Take 25 mcg by mouth daily  , Disp: , Rfl:     loratadine (CLARITIN) 10 mg tablet, Take 1 tablet (10 mg total) by mouth daily, Disp: 90 tablet, Rfl: 3    omeprazole (PriLOSEC) 40 MG capsule, Take 80 mg by mouth 2 (two) times a day  , Disp: , Rfl:     PARoxetine (PAXIL) 30 mg tablet, Take 30 mg by mouth daily  , Disp: , Rfl: 5    triamcinolone (KENALOG) 0 1 % cream, Apply topically 2 (two) times a day, Disp: 80 g, Rfl: 2    ziprasidone (GEODON) 80 mg capsule, 80 mg 3 (three) times a day, Disp: , Rfl: 5    calcium carbonate-vitamin D (OSCAL-D) 500 mg-200 units per tablet, TAKE 1 TABLET BY MOUTH 2 (TWO) TIMES A DAY WITH MEALS FOR 30 DAYS, Disp: 60 tablet, Rfl: 0    LORazepam (ATIVAN) 2 mg tablet, Take 1 tablet (2 mg total) by mouth 2 (two) times a day as needed for anxiety for up to 4 days (Patient taking differently: Take 1 mg by mouth 3 (three) times a day  ), Disp: 8 tablet, Rfl: 0    naproxen (NAPROSYN) 500 mg tablet, Take 1 tablet (500 mg total) by mouth 2 (two) times a day with meals for 7 days, Disp: 30 tablet, Rfl: 0    QUEtiapine (SEROquel) 400 MG tablet, Take 1 tablet (400 mg total) by mouth daily at bedtime for 30 days, Disp: 30 tablet, Rfl: 0    Allergies   Allergen Reactions    Latex     Risperdal [Risperidone]     Zyprexa [Olanzapine]        Physical Exam:    /80   Pulse 83   Ht 5' 3" (1 6 m)   Wt 66 7 kg (147 lb)   BMI 26 04 kg/m²     Constitutional:normal, well developed, well nourished, alert, in no distress and non-toxic and no overt pain behavior  Eyes:anicteric  HEENT:grossly intact  Neck:supple, symmetric, trachea midline and no masses   Pulmonary:even and unlabored  Cardiovascular:No edema or pitting edema present  Skin:Normal without rashes or lesions and well hydrated  Psychiatric:Mood and affect appropriate  Neurologic:Cranial Nerves II-XII grossly intact  Musculoskeletal:normal gait  Imaging  No orders to display     MRI LUMBAR SPINE WITHOUT CONTRAST     INDICATION: M71 38: Other bursal cyst, other site  History taken directly from the electronic ordering system  Follow-up evaluation  Low back pain      COMPARISON:  8/15/2016     TECHNIQUE:  Sagittal T1, sagittal T2, sagittal inversion recovery, axial T1 and axial T2, coronal T2        IMAGE QUALITY:  Diagnostic     FINDINGS:     ALIGNMENT:  Mild to moderate levoscoliosis mid to lower lumbar spine is stable  Grade 1 anterolisthesis of L4 on L5 is also stable      MARROW SIGNAL:  Mild chronic superior endplate compression abnormality of T12 stable    Minimal scattered degenerative endplate changes elsewhere are stable  No bone marrow edema or focally suspicious marrow lesions      DISTAL CORD AND CONUS:  Normal size and signal within the distal cord and conus  The conus ends at the inferior endplate of L48 level      PARASPINAL SOFT TISSUES:  Prominent bladder distention is incompletely imaged with probable resultant physiologic fullness of the renal collecting systems      SACRUM:  Normal signal within the sacrum  No evidence of insufficiency or stress fracture      LOWER THORACIC DISC SPACES:  Normal disc height and signal   No disc herniation, canal stenosis or foraminal narrowing      LUMBAR DISC SPACES:     L1-L2:  Normal      L2-L3:  Normal      L3-L4:  Normal      L4-L5:  There is uncovering of the intervertebral discs space  Advanced facet hypertrophy noted  Significant interval involution of the previously present right-sided facet cyst now approximately 9 mm in maximal dimension, previously approximately 17   mm in maximal dimension  Associated mass effect on the thecal sac has diminished  There is moderate tricompartmental narrowing      L5-S1:  There is a diffuse disc bulge  There is bilateral facet hypertrophy  Minimal tricompartmental narrowing      IMPRESSION:        1     Grade 1 anterolisthesis of L4 on L5 and mild to moderate levoscoliosis mid lumbar spine stable  2  Involuting right-sided facet cyst at L4-5 with associated diminished mass effect on the thecal sac  3  Spondylotic changes elsewhere are stable  No new disc herniation  4   Prominent bladder distention and probable associated physiologic distention of the renal collecting system  Correlation for urostasis/chronic bladder outlet obstruction advised  Consider bladder ultrasound with post void residual for further   characterization, as clinically indicated           No orders of the defined types were placed in this encounter

## 2018-11-28 NOTE — PROGRESS NOTES
Assessment/Plan:    Dysuria  Dysuria for roughly 3 months after catheterization  No fevers, no recent antibiotic treatment, UA historical at current shows no sign of UTI   -advised patient to maintain adequate fluid intake  -advised patient to go to the emergency room or call the office if she develops fever,  bloody urine or great increasing pain  -phenazopyridine 100mg TID after meals for bladder spasm  Subjective:      Patient ID: Maeve Aguilera is a 62 y o  female  HPI  71-year-old female patient with past medical history significant for UTI in the past presents with 3 months history of pain with urination  Patient states this has been going on for about 3 months since she was catheterized in the hospital, the pain is a dull pain, comes and goes, and when it happens, the urine is cloudy and "it smells like bread"  No fevers  No blood in the urine  Patient has not had any antibiotic treatment since around September Prior urine analysis on 11/01/2018 showed negative leukocyte, nitrates, protein, glucose blood  Incidental finding, patient's last BMP 11/01/2018 showed potassium of 2 8, sodium of 128 and chloride of 92  Patient is still taking hydrochlorothiazide  Patient is not on any oral supplement potassium  Patient had BMP that schedule in the future but she has not completed  Pt is getting Ativan from a psychiatrist  Pt is seeing a therapist once a week  PHQ-2 negative  Review of Systems   Constitutional: Positive for unexpected weight change  Negative for chills and fever  Patient has some significant weight loss, down from 170 lb to 146 lb in 3 months  Patient states is due to her being off of Ativan and being anxious  Patient was recently placed back on Ativan, states she is feeling much better now   HENT: Negative for rhinorrhea and sore throat  Eyes: Negative for pain and redness  Respiratory: Negative for cough, chest tightness, shortness of breath and wheezing  Cardiovascular: Negative for chest pain and palpitations  Gastrointestinal: Positive for constipation and nausea  Negative for abdominal pain, blood in stool, diarrhea and vomiting  Nausea secondary to GERD last week   Genitourinary: Positive for dysuria and flank pain  Negative for difficulty urinating, hematuria, vaginal bleeding and vaginal discharge  Flank pain back in September   Musculoskeletal: Positive for back pain  Skin: Negative for rash  Allergic/Immunologic: Negative for environmental allergies and food allergies  Neurological: Negative for dizziness, light-headedness and headaches  Hematological: Does not bruise/bleed easily  Psychiatric/Behavioral: Negative for self-injury, sleep disturbance and suicidal ideas  Objective:    /84   Pulse 70   Temp 97 9 °F (36 6 °C)   Resp 16   Ht 5' 3" (1 6 m)   Wt 66 7 kg (147 lb)   BMI 26 04 kg/m²      Physical Exam   Constitutional: She is oriented to person, place, and time  She appears well-developed and well-nourished  No distress  Lip smack still present   HENT:   Head: Normocephalic and atraumatic  Right Ear: External ear normal    Left Ear: External ear normal    Nose: Nose normal    Mouth/Throat: Oropharynx is clear and moist  No oropharyngeal exudate  Eyes: EOM are normal  Right eye exhibits no discharge  Left eye exhibits no discharge  No scleral icterus  Neck: Neck supple  Cardiovascular: Normal rate, regular rhythm, normal heart sounds and intact distal pulses  Exam reveals no gallop and no friction rub  No murmur heard  Pulmonary/Chest: Effort normal and breath sounds normal  No stridor  No respiratory distress  She has no wheezes  She has no rales  Abdominal: Soft  Bowel sounds are normal  She exhibits no distension and no mass  There is tenderness  There is no rebound and no guarding  Mild tenderness at lower quadrents   Musculoskeletal: She exhibits no edema, tenderness or deformity  Lymphadenopathy:     She has no cervical adenopathy  Neurological: She is alert and oriented to person, place, and time  Skin: Skin is warm and dry  No rash noted  She is not diaphoretic  No erythema  No pallor  Psychiatric: She has a normal mood and affect  Denies SI and HI   Vitals reviewed  Reeves Cogan, M D    Family Medicine, PGY-1

## 2018-12-13 NOTE — TELEPHONE ENCOUNTER
I did not see meclizine in her medication list  Please call pt back and ask why she is asking for this medication and what specifically is her symptoms

## 2018-12-13 NOTE — TELEPHONE ENCOUNTER
Patient received rx from physician @  2 years ago  Was told she would have to be seen   Scheduled 12/19/18

## 2018-12-22 DIAGNOSIS — K21.9 GASTROESOPHAGEAL REFLUX DISEASE WITHOUT ESOPHAGITIS: ICD-10-CM

## 2018-12-25 RX ORDER — OMEPRAZOLE 40 MG/1
CAPSULE, DELAYED RELEASE ORAL
Qty: 60 CAPSULE | Refills: 5 | Status: SHIPPED | OUTPATIENT
Start: 2018-12-25 | End: 2019-04-27 | Stop reason: SDUPTHER

## 2019-01-29 DIAGNOSIS — E03.9 ACQUIRED HYPOTHYROIDISM: ICD-10-CM

## 2019-01-29 RX ORDER — LEVOTHYROXINE SODIUM 0.03 MG/1
TABLET ORAL
Qty: 30 TABLET | Refills: 2 | Status: SHIPPED | OUTPATIENT
Start: 2019-01-29 | End: 2019-04-27 | Stop reason: SDUPTHER

## 2019-03-11 ENCOUNTER — TELEPHONE (OUTPATIENT)
Dept: FAMILY MEDICINE CLINIC | Facility: CLINIC | Age: 59
End: 2019-03-11

## 2019-03-11 NOTE — TELEPHONE ENCOUNTER
Voice message, patient states she has an appointment on 3/21/2019 with her psychiatrist  In the meantime, she has run out of Lorazepam, they will not refill for her until visit  She wants to know if we can offer refill?

## 2019-03-12 NOTE — TELEPHONE ENCOUNTER
Patient called I ws able to get her in for tomorrow with Dr Ish Chauhan [facility]) per patient could not wait till Friday to see Dean Bowen

## 2019-03-12 NOTE — TELEPHONE ENCOUNTER
Pt is prescribed lorazepam 2mg 75 tablet on 2/17/19  Although it was written for 25 days, her lorazepam shows to take twice a day prn  I am personally unable to prescribe lorazepam because I do not have a RODGER number  If pt has persistent problem with anxiety, please have her make an appointment with the office

## 2019-03-13 ENCOUNTER — OFFICE VISIT (OUTPATIENT)
Dept: FAMILY MEDICINE CLINIC | Facility: CLINIC | Age: 59
End: 2019-03-13

## 2019-03-13 VITALS
TEMPERATURE: 98.9 F | WEIGHT: 163.6 LBS | RESPIRATION RATE: 16 BRPM | HEIGHT: 63 IN | HEART RATE: 88 BPM | SYSTOLIC BLOOD PRESSURE: 134 MMHG | DIASTOLIC BLOOD PRESSURE: 88 MMHG | BODY MASS INDEX: 28.99 KG/M2

## 2019-03-13 DIAGNOSIS — F41.9 ANXIETY: ICD-10-CM

## 2019-03-13 DIAGNOSIS — F25.1 SCHIZOAFFECTIVE DISORDER, DEPRESSIVE TYPE (HCC): Primary | Chronic | ICD-10-CM

## 2019-03-13 PROCEDURE — 99213 OFFICE O/P EST LOW 20 MIN: CPT | Performed by: FAMILY MEDICINE

## 2019-03-13 RX ORDER — LORAZEPAM 2 MG/1
2 TABLET ORAL 3 TIMES DAILY PRN
Qty: 24 TABLET | Refills: 0 | Status: SHIPPED | OUTPATIENT
Start: 2019-03-13 | End: 2019-05-22 | Stop reason: SDUPTHER

## 2019-03-13 NOTE — ASSESSMENT & PLAN NOTE
Status:  Stable  · Continue medications as prescribed  · Patient given 8 days of Ativan to bridge her until she sees her psychiatrist   · Counseled patient that we cannot prescribe further Ativan if she runs out     · Will need to Follow-up with psychiatrist on 03/21/2019

## 2019-03-13 NOTE — PROGRESS NOTES
Geovanna Shelley 1960 female MRN: [id number]    Acute Visit        ASSESSMENT/PLAN  Problem List Items Addressed This Visit        Other    Schizoaffective disorder, depressive type ([facility] [address] ) - Primary (Chronic)    Relevant Medications    LORazepam (ATIVAN) 2 mg tablet    Anxiety     Status:  Stable  · Continue medications as prescribed  · Patient given 8 days of Ativan to bridge her until she sees her psychiatrist   · Counseled patient that we cannot prescribe further Ativan if she runs out  · Will need to Follow-up with psychiatrist on 03/21/2019         Relevant Medications    LORazepam (ATIVAN) 2 mg tablet                Future Appointments   Date Time Provider Zonia Wolfei   5/22/2019  1:00 PM Ileana Gandara MD [facility]        SUBJECTIVE  CC: Follow-up       Patient is here for a refill of her ativan  Patient sees Dr Brenda Mccullough as her psychiatrist  Takes 2mg tablet TID PRN  Has been recquiring  Was previously reduced to 1mg TID but could not tolerate and was switched back to the original  Admits to stable mood and denies SI/HI  She is teaches sociology at [employer]  Patient denies recent seizures  Denies hallucinations or paranoia  Is scheduled with her psychiatrist on 3/21/19  Review of Systems   Constitutional: Negative for activity change, chills and fever  HENT: Negative for sinus pressure and sore throat  Respiratory: Negative for cough, chest tightness, shortness of breath and wheezing  Cardiovascular: Negative for chest pain, palpitations and leg swelling  Gastrointestinal: Positive for constipation  Negative for abdominal pain, blood in stool, diarrhea, nausea and vomiting  Genitourinary: Negative for dysuria, frequency and hematuria  Neurological: Negative for dizziness, seizures, syncope, weakness, light-headedness, numbness and headaches  Psychiatric/Behavioral: Negative for agitation, hallucinations, self-injury, sleep disturbance and suicidal ideas  The patient is nervous/anxious  Historical Information   The patient history was reviewed as follows:  Past Medical History:   Diagnosis Date    Anxiety     Back pain at L4-L5 level     Schreiber esophagus     Bulimia     Disease of thyroid gland     hypothyroid    GERD (gastroesophageal reflux disease)     Hyperlipidemia     Hypothyroidism     Leukopenia     Sciatica     Seizure (HCC)     Synovial cyst of lumbar spine     Vertigo     Weight gain      Past Surgical History:   Procedure Laterality Date    BONE MARROW BIOPSY      BREAST SURGERY      enlargement     RHINOPLASTY       Family History   Problem Relation Age of Onset    Uterine cancer Mother     Esophageal cancer Father     Colon cancer Maternal Grandmother       Social History   Social History     Substance and Sexual Activity   Alcohol Use No     Social History     Substance and Sexual Activity   Drug Use No     Social History     Tobacco Use   Smoking Status Never Smoker   Smokeless Tobacco Never Used       Medications:   Meds/Allergies   Current Outpatient Medications   Medication Sig Dispense Refill    calcium carbonate-vitamin D (OSCAL-D) 500 mg-200 units per tablet TAKE 1 TABLET BY MOUTH 2 (TWO) TIMES A DAY WITH MEALS FOR 30 DAYS 60 tablet 0    ferrous sulfate 325 (65 Fe) mg tablet TAKE 1 TABLET (325 MG TOTAL) BY MOUTH 2 (TWO) TIMES A DAY FOR 90 DAYS 180 tablet 0    hydrOXYzine HCL (ATARAX) 25 mg tablet Take 1 tablet (25 mg total) by mouth every 6 (six) hours 12 tablet 0    levothyroxine 25 mcg tablet Take 25 mcg by mouth daily        levothyroxine 25 mcg tablet TAKE 1 TABLET BY MOUTH EVERY DAY 30 tablet 2    loratadine (CLARITIN) 10 mg tablet Take 1 tablet (10 mg total) by mouth daily 90 tablet 3    LORazepam (ATIVAN) 2 mg tablet Take 1 tablet (2 mg total) by mouth 3 (three) times a day as needed for anxiety for up to 8 days 24 tablet 0    naproxen (NAPROSYN) 500 mg tablet Take 1 tablet (500 mg total) by mouth 2 (two) times a day with meals for 7 days 30 tablet 0    omeprazole (PriLOSEC) 40 MG capsule Take 80 mg by mouth 2 (two) times a day        omeprazole (PriLOSEC) 40 MG capsule TAKE ONE CAPSULE BY MOUTH TWO TIMES A DAY 60 capsule 5    PARoxetine (PAXIL) 30 mg tablet Take 30 mg by mouth daily    5    QUEtiapine (SEROquel) 400 MG tablet Take 1 tablet (400 mg total) by mouth daily at bedtime for 30 days 30 tablet 0    triamcinolone (KENALOG) 0 1 % cream Apply topically 2 (two) times a day 80 g 2    ziprasidone (GEODON) 80 mg capsule 80 mg 3 (three) times a day  5     No current facility-administered medications for this visit  Allergies   Allergen Reactions    Latex     Risperdal [Risperidone]     Zyprexa [Olanzapine]        OBJECTIVE  Vitals:   Vitals:    03/13/19 1555   BP: 134/88   Pulse: 88   Resp: 16   Temp: 98 9 °F (37 2 °C)   Weight: 74 2 kg (163 lb 9 6 oz)   Height: 5' 2 6" (1 59 m)       Invasive Devices          None          Physical Exam   Constitutional: She is oriented to person, place, and time  She appears well-developed and well-nourished  No distress  HENT:   Head: Normocephalic and atraumatic  Right Ear: External ear normal    Left Ear: External ear normal    Nose: Nose normal    Mouth/Throat: Oropharynx is clear and moist  No oropharyngeal exudate  Eyes: Pupils are equal, round, and reactive to light  Conjunctivae are normal  Right eye exhibits no discharge  Left eye exhibits no discharge  No scleral icterus  Neck: Normal range of motion  Neck supple  No JVD present  No tracheal deviation present  No thyromegaly present  Cardiovascular: Normal rate, regular rhythm, normal heart sounds and intact distal pulses  Exam reveals no gallop and no friction rub  No murmur heard  Pulmonary/Chest: Effort normal and breath sounds normal  No respiratory distress  She has no wheezes  She has no rales  She exhibits no tenderness  Abdominal: Soft   Bowel sounds are normal  She exhibits no distension  There is no tenderness  There is no rebound and no guarding  Musculoskeletal: Normal range of motion  She exhibits no edema  Neurological: She is alert and oriented to person, place, and time  No cranial nerve deficit  Coordination normal    Skin: Skin is warm and dry  She is not diaphoretic  Psychiatric: Her speech is normal and behavior is normal  Judgment and thought content normal  Her mood appears not anxious  Her affect is not inappropriate  She is not actively hallucinating  Cognition and memory are normal  She does not exhibit a depressed mood  Vitals reviewed  Lab:  I have personally reviewed all pertinent results

## 2019-04-11 ENCOUNTER — HOSPITAL ENCOUNTER (EMERGENCY)
Facility: HOSPITAL | Age: 59
Discharge: HOME | End: 2019-04-11
Attending: EMERGENCY MEDICINE
Payer: COMMERCIAL

## 2019-04-11 VITALS
HEART RATE: 93 BPM | SYSTOLIC BLOOD PRESSURE: 151 MMHG | RESPIRATION RATE: 16 BRPM | OXYGEN SATURATION: 98 % | WEIGHT: 160 LBS | DIASTOLIC BLOOD PRESSURE: 87 MMHG | BODY MASS INDEX: 28.71 KG/M2

## 2019-04-11 DIAGNOSIS — F41.0 ANXIETY ATTACK: Primary | ICD-10-CM

## 2019-04-11 PROCEDURE — 99284 EMERGENCY DEPT VISIT MOD MDM: CPT | Performed by: EMERGENCY MEDICINE

## 2019-04-11 PROCEDURE — 99283 EMERGENCY DEPT VISIT LOW MDM: CPT

## 2019-04-11 RX ORDER — LORAZEPAM 2 MG/1
2 TABLET ORAL EVERY 8 HOURS PRN
Qty: 20 TABLET | Refills: 0 | Status: SHIPPED | OUTPATIENT
Start: 2019-04-11 | End: 2019-05-22 | Stop reason: SDUPTHER

## 2019-04-27 DIAGNOSIS — E03.9 ACQUIRED HYPOTHYROIDISM: ICD-10-CM

## 2019-04-27 DIAGNOSIS — K21.9 GASTROESOPHAGEAL REFLUX DISEASE WITHOUT ESOPHAGITIS: ICD-10-CM

## 2019-04-29 ENCOUNTER — OFFICE VISIT (OUTPATIENT)
Dept: FAMILY MEDICINE CLINIC | Facility: CLINIC | Age: 59
End: 2019-04-29

## 2019-04-29 VITALS
HEIGHT: 63 IN | TEMPERATURE: 99.4 F | DIASTOLIC BLOOD PRESSURE: 70 MMHG | HEART RATE: 78 BPM | SYSTOLIC BLOOD PRESSURE: 120 MMHG | WEIGHT: 163.2 LBS | BODY MASS INDEX: 28.92 KG/M2 | RESPIRATION RATE: 16 BRPM

## 2019-04-29 DIAGNOSIS — J06.9 VIRAL UPPER RESPIRATORY TRACT INFECTION: Primary | ICD-10-CM

## 2019-04-29 PROCEDURE — 99213 OFFICE O/P EST LOW 20 MIN: CPT | Performed by: FAMILY MEDICINE

## 2019-04-29 RX ORDER — GUAIFENESIN 600 MG
600 TABLET, EXTENDED RELEASE 12 HR ORAL EVERY 12 HOURS SCHEDULED
Qty: 14 TABLET | Refills: 0 | Status: SHIPPED | OUTPATIENT
Start: 2019-04-29 | End: 2019-05-06

## 2019-04-30 RX ORDER — LEVOTHYROXINE SODIUM 0.03 MG/1
TABLET ORAL
Qty: 30 TABLET | Refills: 2 | Status: SHIPPED | OUTPATIENT
Start: 2019-04-30 | End: 2019-05-22 | Stop reason: SDUPTHER

## 2019-04-30 RX ORDER — OMEPRAZOLE 40 MG/1
CAPSULE, DELAYED RELEASE ORAL
Qty: 60 CAPSULE | Refills: 3 | Status: SHIPPED | OUTPATIENT
Start: 2019-04-30 | End: 2019-05-22 | Stop reason: SDUPTHER

## 2019-05-04 ENCOUNTER — HOSPITAL ENCOUNTER (OUTPATIENT)
Facility: HOSPITAL | Age: 59
Setting detail: OBSERVATION
Discharge: HOME | End: 2019-05-05
Attending: EMERGENCY MEDICINE | Admitting: FAMILY MEDICINE
Payer: COMMERCIAL

## 2019-05-04 DIAGNOSIS — E23.2 PRIMARY POLYDIPSIA (HCC): ICD-10-CM

## 2019-05-04 DIAGNOSIS — E87.6 HYPOKALEMIA: ICD-10-CM

## 2019-05-04 DIAGNOSIS — E87.1 HYPONATREMIA: Primary | ICD-10-CM

## 2019-05-04 DIAGNOSIS — R11.0 NAUSEA: ICD-10-CM

## 2019-05-04 LAB
ALBUMIN SERPL BCP-MCNC: 3.7 G/DL (ref 3.5–5)
ALP SERPL-CCNC: 106 U/L (ref 46–116)
ALT SERPL W P-5'-P-CCNC: 30 U/L (ref 12–78)
AMPHETAMINES SERPL QL SCN: NEGATIVE
ANION GAP SERPL CALCULATED.3IONS-SCNC: 6 MMOL/L (ref 4–13)
AST SERPL W P-5'-P-CCNC: 27 U/L (ref 5–45)
BARBITURATES UR QL: NEGATIVE
BASOPHILS # BLD AUTO: 0.03 THOUSANDS/ΜL (ref 0–0.1)
BASOPHILS NFR BLD AUTO: 1 % (ref 0–1)
BENZODIAZ UR QL: NEGATIVE
BILIRUB SERPL-MCNC: 0.4 MG/DL (ref 0.2–1)
BUN SERPL-MCNC: 6 MG/DL (ref 5–25)
CALCIUM SERPL-MCNC: 8.9 MG/DL (ref 8.3–10.1)
CHLORIDE SERPL-SCNC: 92 MMOL/L (ref 100–108)
CO2 SERPL-SCNC: 27 MMOL/L (ref 21–32)
COCAINE UR QL: NEGATIVE
CORTIS SERPL-MCNC: 5.2 UG/DL
CREAT SERPL-MCNC: 0.71 MG/DL (ref 0.6–1.3)
EOSINOPHIL # BLD AUTO: 0.04 THOUSAND/ΜL (ref 0–0.61)
EOSINOPHIL NFR BLD AUTO: 1 % (ref 0–6)
ERYTHROCYTE [DISTWIDTH] IN BLOOD BY AUTOMATED COUNT: 14.6 % (ref 11.6–15.1)
GFR SERPL CREATININE-BSD FRML MDRD: 94 ML/MIN/1.73SQ M
GLUCOSE SERPL-MCNC: 109 MG/DL (ref 65–140)
HCT VFR BLD AUTO: 34.3 % (ref 34.8–46.1)
HGB BLD-MCNC: 11.8 G/DL (ref 11.5–15.4)
IMM GRANULOCYTES # BLD AUTO: 0.01 THOUSAND/UL (ref 0–0.2)
IMM GRANULOCYTES NFR BLD AUTO: 0 % (ref 0–2)
LYMPHOCYTES # BLD AUTO: 0.9 THOUSANDS/ΜL (ref 0.6–4.47)
LYMPHOCYTES NFR BLD AUTO: 22 % (ref 14–44)
MAGNESIUM SERPL-MCNC: 1.8 MG/DL (ref 1.6–2.6)
MCH RBC QN AUTO: 27.1 PG (ref 26.8–34.3)
MCHC RBC AUTO-ENTMCNC: 34.4 G/DL (ref 31.4–37.4)
MCV RBC AUTO: 79 FL (ref 82–98)
METHADONE UR QL: NEGATIVE
MONOCYTES # BLD AUTO: 0.41 THOUSAND/ΜL (ref 0.17–1.22)
MONOCYTES NFR BLD AUTO: 10 % (ref 4–12)
NEUTROPHILS # BLD AUTO: 2.62 THOUSANDS/ΜL (ref 1.85–7.62)
NEUTS SEG NFR BLD AUTO: 66 % (ref 43–75)
NRBC BLD AUTO-RTO: 0 /100 WBCS
OPIATES UR QL SCN: POSITIVE
OSMOLALITY UR/SERPL-RTO: 263 MMOL/KG (ref 282–298)
OSMOLALITY UR: <50 MMOL/KG
PCP UR QL: NEGATIVE
PHOSPHATE SERPL-MCNC: 4.1 MG/DL (ref 2.7–4.5)
PLATELET # BLD AUTO: 251 THOUSANDS/UL (ref 149–390)
PMV BLD AUTO: 8.5 FL (ref 8.9–12.7)
POTASSIUM SERPL-SCNC: 2.5 MMOL/L (ref 3.5–5.3)
POTASSIUM UR-SCNC: <1 MMOL/L (ref 1–300)
PROT SERPL-MCNC: 7.2 G/DL (ref 6.4–8.2)
RBC # BLD AUTO: 4.36 MILLION/UL (ref 3.81–5.12)
SODIUM 24H UR-SCNC: 13 MOL/L
SODIUM SERPL-SCNC: 125 MMOL/L (ref 136–145)
THC UR QL: NEGATIVE
TSH SERPL DL<=0.05 MIU/L-ACNC: 3.64 UIU/ML (ref 0.36–3.74)
WBC # BLD AUTO: 4.01 THOUSAND/UL (ref 4.31–10.16)

## 2019-05-04 PROCEDURE — 36415 COLL VENOUS BLD VENIPUNCTURE: CPT | Performed by: EMERGENCY MEDICINE

## 2019-05-04 PROCEDURE — 99285 EMERGENCY DEPT VISIT HI MDM: CPT

## 2019-05-04 PROCEDURE — 84300 ASSAY OF URINE SODIUM: CPT | Performed by: EMERGENCY MEDICINE

## 2019-05-04 PROCEDURE — 93005 ELECTROCARDIOGRAM TRACING: CPT

## 2019-05-04 PROCEDURE — 80053 COMPREHEN METABOLIC PANEL: CPT | Performed by: EMERGENCY MEDICINE

## 2019-05-04 PROCEDURE — 83930 ASSAY OF BLOOD OSMOLALITY: CPT | Performed by: EMERGENCY MEDICINE

## 2019-05-04 PROCEDURE — 99283 EMERGENCY DEPT VISIT LOW MDM: CPT | Performed by: EMERGENCY MEDICINE

## 2019-05-04 PROCEDURE — 83735 ASSAY OF MAGNESIUM: CPT | Performed by: FAMILY MEDICINE

## 2019-05-04 PROCEDURE — 85025 COMPLETE CBC W/AUTO DIFF WBC: CPT | Performed by: EMERGENCY MEDICINE

## 2019-05-04 PROCEDURE — 96365 THER/PROPH/DIAG IV INF INIT: CPT

## 2019-05-04 PROCEDURE — 84100 ASSAY OF PHOSPHORUS: CPT | Performed by: FAMILY MEDICINE

## 2019-05-04 PROCEDURE — 80048 BASIC METABOLIC PNL TOTAL CA: CPT | Performed by: FAMILY MEDICINE

## 2019-05-04 PROCEDURE — 83935 ASSAY OF URINE OSMOLALITY: CPT | Performed by: EMERGENCY MEDICINE

## 2019-05-04 PROCEDURE — 82533 TOTAL CORTISOL: CPT | Performed by: EMERGENCY MEDICINE

## 2019-05-04 PROCEDURE — 84133 ASSAY OF URINE POTASSIUM: CPT | Performed by: EMERGENCY MEDICINE

## 2019-05-04 PROCEDURE — 80307 DRUG TEST PRSMV CHEM ANLYZR: CPT | Performed by: FAMILY MEDICINE

## 2019-05-04 PROCEDURE — 84443 ASSAY THYROID STIM HORMONE: CPT | Performed by: EMERGENCY MEDICINE

## 2019-05-04 RX ORDER — ONDANSETRON 8 MG/1
8 TABLET, ORALLY DISINTEGRATING ORAL ONCE
Status: COMPLETED | OUTPATIENT
Start: 2019-05-04 | End: 2019-05-04

## 2019-05-04 RX ORDER — POTASSIUM CHLORIDE 14.9 MG/ML
20 INJECTION INTRAVENOUS ONCE
Status: COMPLETED | OUTPATIENT
Start: 2019-05-04 | End: 2019-05-04

## 2019-05-04 RX ORDER — ONDANSETRON 2 MG/ML
4 INJECTION INTRAMUSCULAR; INTRAVENOUS EVERY 6 HOURS PRN
Status: DISCONTINUED | OUTPATIENT
Start: 2019-05-04 | End: 2019-05-05 | Stop reason: HOSPADM

## 2019-05-04 RX ORDER — POTASSIUM CHLORIDE 20 MEQ/1
40 TABLET, EXTENDED RELEASE ORAL ONCE
Status: COMPLETED | OUTPATIENT
Start: 2019-05-04 | End: 2019-05-04

## 2019-05-04 RX ORDER — LORAZEPAM 1 MG/1
2 TABLET ORAL DAILY
Status: DISCONTINUED | OUTPATIENT
Start: 2019-05-05 | End: 2019-05-05 | Stop reason: HOSPADM

## 2019-05-04 RX ORDER — SENNOSIDES 8.6 MG
1 TABLET ORAL DAILY
Status: DISCONTINUED | OUTPATIENT
Start: 2019-05-05 | End: 2019-05-05 | Stop reason: HOSPADM

## 2019-05-04 RX ORDER — PANTOPRAZOLE SODIUM 40 MG/1
40 TABLET, DELAYED RELEASE ORAL
Status: DISCONTINUED | OUTPATIENT
Start: 2019-05-04 | End: 2019-05-05 | Stop reason: HOSPADM

## 2019-05-04 RX ORDER — QUETIAPINE FUMARATE 100 MG/1
400 TABLET, FILM COATED ORAL
Status: DISCONTINUED | OUTPATIENT
Start: 2019-05-04 | End: 2019-05-05 | Stop reason: HOSPADM

## 2019-05-04 RX ORDER — LEVOTHYROXINE SODIUM 0.03 MG/1
25 TABLET ORAL
Status: DISCONTINUED | OUTPATIENT
Start: 2019-05-05 | End: 2019-05-05 | Stop reason: HOSPADM

## 2019-05-04 RX ORDER — ACETAMINOPHEN 325 MG/1
650 TABLET ORAL EVERY 6 HOURS PRN
Status: DISCONTINUED | OUTPATIENT
Start: 2019-05-04 | End: 2019-05-05 | Stop reason: HOSPADM

## 2019-05-04 RX ORDER — SODIUM CHLORIDE 9 MG/ML
100 INJECTION, SOLUTION INTRAVENOUS CONTINUOUS
Status: DISCONTINUED | OUTPATIENT
Start: 2019-05-04 | End: 2019-05-04

## 2019-05-04 RX ORDER — LORAZEPAM 0.5 MG/1
2 TABLET ORAL ONCE
Status: COMPLETED | OUTPATIENT
Start: 2019-05-04 | End: 2019-05-04

## 2019-05-04 RX ORDER — DOCUSATE SODIUM 100 MG/1
100 CAPSULE, LIQUID FILLED ORAL 2 TIMES DAILY
Status: DISCONTINUED | OUTPATIENT
Start: 2019-05-04 | End: 2019-05-05 | Stop reason: HOSPADM

## 2019-05-04 RX ORDER — ZIPRASIDONE HYDROCHLORIDE 40 MG/1
80 CAPSULE ORAL 3 TIMES DAILY
Status: DISCONTINUED | OUTPATIENT
Start: 2019-05-04 | End: 2019-05-05 | Stop reason: HOSPADM

## 2019-05-04 RX ADMIN — POTASSIUM CHLORIDE 40 MEQ: 1500 TABLET, EXTENDED RELEASE ORAL at 19:30

## 2019-05-04 RX ADMIN — POTASSIUM CHLORIDE 20 MEQ: 200 INJECTION, SOLUTION INTRAVENOUS at 19:30

## 2019-05-04 RX ADMIN — DOCUSATE SODIUM 100 MG: 100 CAPSULE, LIQUID FILLED ORAL at 22:33

## 2019-05-04 RX ADMIN — LORAZEPAM 2 MG: 0.5 TABLET ORAL at 17:42

## 2019-05-04 RX ADMIN — ONDANSETRON 8 MG: 8 TABLET, ORALLY DISINTEGRATING ORAL at 17:42

## 2019-05-04 RX ADMIN — SODIUM CHLORIDE 100 ML/HR: 0.9 INJECTION, SOLUTION INTRAVENOUS at 19:30

## 2019-05-04 RX ADMIN — PANTOPRAZOLE SODIUM 40 MG: 40 TABLET, DELAYED RELEASE ORAL at 22:33

## 2019-05-05 VITALS
DIASTOLIC BLOOD PRESSURE: 68 MMHG | BODY MASS INDEX: 28.67 KG/M2 | HEART RATE: 83 BPM | OXYGEN SATURATION: 96 % | TEMPERATURE: 98.1 F | WEIGHT: 161.8 LBS | RESPIRATION RATE: 18 BRPM | HEIGHT: 63 IN | SYSTOLIC BLOOD PRESSURE: 129 MMHG

## 2019-05-05 LAB
ANION GAP SERPL CALCULATED.3IONS-SCNC: 4 MMOL/L (ref 4–13)
ANION GAP SERPL CALCULATED.3IONS-SCNC: 6 MMOL/L (ref 4–13)
ATRIAL RATE: 72 BPM
BASOPHILS # BLD AUTO: 0.04 THOUSANDS/ΜL (ref 0–0.1)
BASOPHILS NFR BLD AUTO: 1 % (ref 0–1)
BUN SERPL-MCNC: 5 MG/DL (ref 5–25)
BUN SERPL-MCNC: 9 MG/DL (ref 5–25)
CALCIUM SERPL-MCNC: 8.6 MG/DL (ref 8.3–10.1)
CALCIUM SERPL-MCNC: 8.7 MG/DL (ref 8.3–10.1)
CHLORIDE SERPL-SCNC: 103 MMOL/L (ref 100–108)
CHLORIDE SERPL-SCNC: 99 MMOL/L (ref 100–108)
CO2 SERPL-SCNC: 26 MMOL/L (ref 21–32)
CO2 SERPL-SCNC: 26 MMOL/L (ref 21–32)
CREAT SERPL-MCNC: 0.71 MG/DL (ref 0.6–1.3)
CREAT SERPL-MCNC: 0.83 MG/DL (ref 0.6–1.3)
EOSINOPHIL # BLD AUTO: 0.02 THOUSAND/ΜL (ref 0–0.61)
EOSINOPHIL NFR BLD AUTO: 1 % (ref 0–6)
ERYTHROCYTE [DISTWIDTH] IN BLOOD BY AUTOMATED COUNT: 14.7 % (ref 11.6–15.1)
GFR SERPL CREATININE-BSD FRML MDRD: 78 ML/MIN/1.73SQ M
GFR SERPL CREATININE-BSD FRML MDRD: 94 ML/MIN/1.73SQ M
GLUCOSE SERPL-MCNC: 100 MG/DL (ref 65–140)
GLUCOSE SERPL-MCNC: 109 MG/DL (ref 65–140)
HCT VFR BLD AUTO: 33.1 % (ref 34.8–46.1)
HGB BLD-MCNC: 10.8 G/DL (ref 11.5–15.4)
IMM GRANULOCYTES # BLD AUTO: 0.01 THOUSAND/UL (ref 0–0.2)
IMM GRANULOCYTES NFR BLD AUTO: 0 % (ref 0–2)
LYMPHOCYTES # BLD AUTO: 0.7 THOUSANDS/ΜL (ref 0.6–4.47)
LYMPHOCYTES NFR BLD AUTO: 24 % (ref 14–44)
MAGNESIUM SERPL-MCNC: 2.1 MG/DL (ref 1.6–2.6)
MCH RBC QN AUTO: 26.4 PG (ref 26.8–34.3)
MCHC RBC AUTO-ENTMCNC: 32.6 G/DL (ref 31.4–37.4)
MCV RBC AUTO: 81 FL (ref 82–98)
MONOCYTES # BLD AUTO: 0.39 THOUSAND/ΜL (ref 0.17–1.22)
MONOCYTES NFR BLD AUTO: 13 % (ref 4–12)
NEUTROPHILS # BLD AUTO: 1.79 THOUSANDS/ΜL (ref 1.85–7.62)
NEUTS SEG NFR BLD AUTO: 61 % (ref 43–75)
NRBC BLD AUTO-RTO: 0 /100 WBCS
P AXIS: 73 DEGREES
PHOSPHATE SERPL-MCNC: 4.2 MG/DL (ref 2.7–4.5)
PLATELET # BLD AUTO: 212 THOUSANDS/UL (ref 149–390)
PMV BLD AUTO: 8.2 FL (ref 8.9–12.7)
POTASSIUM SERPL-SCNC: 3.6 MMOL/L (ref 3.5–5.3)
POTASSIUM SERPL-SCNC: 3.9 MMOL/L (ref 3.5–5.3)
PR INTERVAL: 136 MS
QRS AXIS: 31 DEGREES
QRSD INTERVAL: 92 MS
QT INTERVAL: 398 MS
QTC INTERVAL: 435 MS
RBC # BLD AUTO: 4.09 MILLION/UL (ref 3.81–5.12)
SODIUM SERPL-SCNC: 131 MMOL/L (ref 136–145)
SODIUM SERPL-SCNC: 133 MMOL/L (ref 136–145)
T WAVE AXIS: 75 DEGREES
VENTRICULAR RATE: 72 BPM
WBC # BLD AUTO: 2.95 THOUSAND/UL (ref 4.31–10.16)

## 2019-05-05 PROCEDURE — 80048 BASIC METABOLIC PNL TOTAL CA: CPT | Performed by: FAMILY MEDICINE

## 2019-05-05 PROCEDURE — 99218 PR INITIAL OBSERVATION CARE/DAY 30 MINUTES: CPT | Performed by: FAMILY MEDICINE

## 2019-05-05 PROCEDURE — 85025 COMPLETE CBC W/AUTO DIFF WBC: CPT | Performed by: FAMILY MEDICINE

## 2019-05-05 PROCEDURE — 83735 ASSAY OF MAGNESIUM: CPT | Performed by: FAMILY MEDICINE

## 2019-05-05 PROCEDURE — 93010 ELECTROCARDIOGRAM REPORT: CPT | Performed by: INTERNAL MEDICINE

## 2019-05-05 PROCEDURE — 84100 ASSAY OF PHOSPHORUS: CPT | Performed by: FAMILY MEDICINE

## 2019-05-05 RX ORDER — POTASSIUM CHLORIDE 20 MEQ/1
40 TABLET, EXTENDED RELEASE ORAL ONCE
Status: COMPLETED | OUTPATIENT
Start: 2019-05-05 | End: 2019-05-05

## 2019-05-05 RX ADMIN — PAROXETINE 30 MG: 20 TABLET, FILM COATED ORAL at 09:58

## 2019-05-05 RX ADMIN — ZIPRASIDONE HCL 80 MG: 40 CAPSULE ORAL at 00:49

## 2019-05-05 RX ADMIN — LEVOTHYROXINE SODIUM 25 MCG: 25 TABLET ORAL at 05:11

## 2019-05-05 RX ADMIN — ZIPRASIDONE HCL 80 MG: 40 CAPSULE ORAL at 09:59

## 2019-05-05 RX ADMIN — POTASSIUM CHLORIDE 40 MEQ: 1500 TABLET, EXTENDED RELEASE ORAL at 01:07

## 2019-05-05 RX ADMIN — QUETIAPINE FUMARATE 400 MG: 100 TABLET ORAL at 01:10

## 2019-05-05 RX ADMIN — LORAZEPAM 2 MG: 1 TABLET ORAL at 09:58

## 2019-05-06 ENCOUNTER — OFFICE VISIT (OUTPATIENT)
Dept: FAMILY MEDICINE CLINIC | Facility: CLINIC | Age: 59
End: 2019-05-06

## 2019-05-06 ENCOUNTER — TRANSITIONAL CARE MANAGEMENT (OUTPATIENT)
Dept: FAMILY MEDICINE CLINIC | Facility: CLINIC | Age: 59
End: 2019-05-06

## 2019-05-06 VITALS
RESPIRATION RATE: 16 BRPM | TEMPERATURE: 99.6 F | DIASTOLIC BLOOD PRESSURE: 100 MMHG | WEIGHT: 164.6 LBS | HEART RATE: 80 BPM | HEIGHT: 63 IN | BODY MASS INDEX: 29.16 KG/M2 | SYSTOLIC BLOOD PRESSURE: 180 MMHG

## 2019-05-06 DIAGNOSIS — F41.9 ANXIETY: Primary | ICD-10-CM

## 2019-05-06 DIAGNOSIS — F41.1 GAD (GENERALIZED ANXIETY DISORDER): Chronic | ICD-10-CM

## 2019-05-06 DIAGNOSIS — E87.1 HYPONATREMIA: ICD-10-CM

## 2019-05-06 PROCEDURE — 1111F DSCHRG MED/CURRENT MED MERGE: CPT | Performed by: FAMILY MEDICINE

## 2019-05-06 PROCEDURE — 99213 OFFICE O/P EST LOW 20 MIN: CPT | Performed by: FAMILY MEDICINE

## 2019-05-06 RX ORDER — LORAZEPAM 1 MG/1
1 TABLET ORAL EVERY 8 HOURS PRN
Qty: 21 TABLET | Refills: 0 | Status: SHIPPED | OUTPATIENT
Start: 2019-05-06 | End: 2019-05-22 | Stop reason: SDUPTHER

## 2019-05-07 PROBLEM — R03.0 ELEVATED BP WITHOUT DIAGNOSIS OF HYPERTENSION: Status: ACTIVE | Noted: 2019-05-07

## 2019-05-14 ENCOUNTER — HOSPITAL ENCOUNTER (EMERGENCY)
Facility: HOSPITAL | Age: 59
Discharge: HOME | End: 2019-05-14
Attending: EMERGENCY MEDICINE
Payer: COMMERCIAL

## 2019-05-14 ENCOUNTER — OFFICE VISIT (OUTPATIENT)
Dept: FAMILY MEDICINE CLINIC | Facility: CLINIC | Age: 59
End: 2019-05-14

## 2019-05-14 ENCOUNTER — PATIENT OUTREACH (OUTPATIENT)
Dept: FAMILY MEDICINE CLINIC | Facility: CLINIC | Age: 59
End: 2019-05-14

## 2019-05-14 ENCOUNTER — TELEPHONE (OUTPATIENT)
Dept: FAMILY MEDICINE CLINIC | Facility: CLINIC | Age: 59
End: 2019-05-14

## 2019-05-14 VITALS
TEMPERATURE: 98.4 F | BODY MASS INDEX: 28.95 KG/M2 | HEART RATE: 96 BPM | RESPIRATION RATE: 16 BRPM | WEIGHT: 163.4 LBS | SYSTOLIC BLOOD PRESSURE: 130 MMHG | HEIGHT: 63 IN | DIASTOLIC BLOOD PRESSURE: 82 MMHG

## 2019-05-14 VITALS
RESPIRATION RATE: 16 BRPM | SYSTOLIC BLOOD PRESSURE: 179 MMHG | HEART RATE: 100 BPM | OXYGEN SATURATION: 98 % | DIASTOLIC BLOOD PRESSURE: 96 MMHG | TEMPERATURE: 98.4 F

## 2019-05-14 DIAGNOSIS — F41.9 ANXIETY: Primary | ICD-10-CM

## 2019-05-14 DIAGNOSIS — Z78.9 NEED FOR FOLLOW-UP BY SOCIAL WORKER: Primary | ICD-10-CM

## 2019-05-14 PROCEDURE — 99282 EMERGENCY DEPT VISIT SF MDM: CPT | Performed by: EMERGENCY MEDICINE

## 2019-05-14 PROCEDURE — 99283 EMERGENCY DEPT VISIT LOW MDM: CPT

## 2019-05-14 PROCEDURE — 99213 OFFICE O/P EST LOW 20 MIN: CPT | Performed by: FAMILY MEDICINE

## 2019-05-14 RX ORDER — LORAZEPAM 1 MG/1
2 TABLET ORAL EVERY 8 HOURS PRN
Qty: 9 TABLET | Refills: 0 | Status: SHIPPED | OUTPATIENT
Start: 2019-05-14 | End: 2019-05-22 | Stop reason: SDUPTHER

## 2019-05-14 RX ORDER — LORAZEPAM 0.5 MG/1
2 TABLET ORAL ONCE
Status: COMPLETED | OUTPATIENT
Start: 2019-05-14 | End: 2019-05-14

## 2019-05-14 RX ADMIN — LORAZEPAM 2 MG: 0.5 TABLET ORAL at 11:24

## 2019-05-16 ENCOUNTER — OFFICE VISIT (OUTPATIENT)
Dept: PAIN MEDICINE | Facility: CLINIC | Age: 59
End: 2019-05-16
Payer: COMMERCIAL

## 2019-05-16 VITALS
BODY MASS INDEX: 29.59 KG/M2 | SYSTOLIC BLOOD PRESSURE: 144 MMHG | WEIGHT: 167 LBS | DIASTOLIC BLOOD PRESSURE: 95 MMHG | HEIGHT: 63 IN

## 2019-05-16 DIAGNOSIS — M51.36 DDD (DEGENERATIVE DISC DISEASE), LUMBAR: ICD-10-CM

## 2019-05-16 DIAGNOSIS — M54.16 LUMBAR RADICULOPATHY: ICD-10-CM

## 2019-05-16 DIAGNOSIS — M47.816 LUMBAR SPONDYLOSIS: Primary | ICD-10-CM

## 2019-05-16 DIAGNOSIS — M43.16 SPONDYLOLISTHESIS AT L4-L5 LEVEL: ICD-10-CM

## 2019-05-16 PROCEDURE — 99214 OFFICE O/P EST MOD 30 MIN: CPT | Performed by: NURSE PRACTITIONER

## 2019-05-20 ENCOUNTER — APPOINTMENT (EMERGENCY)
Dept: RADIOLOGY | Facility: HOSPITAL | Age: 59
End: 2019-05-20
Payer: COMMERCIAL

## 2019-05-20 ENCOUNTER — TELEPHONE (OUTPATIENT)
Dept: BEHAVIORAL/MENTAL HEALTH CLINIC | Facility: CLINIC | Age: 59
End: 2019-05-20

## 2019-05-20 ENCOUNTER — HOSPITAL ENCOUNTER (EMERGENCY)
Facility: HOSPITAL | Age: 59
Discharge: HOME | End: 2019-05-21
Attending: EMERGENCY MEDICINE | Admitting: EMERGENCY MEDICINE
Payer: COMMERCIAL

## 2019-05-20 VITALS
RESPIRATION RATE: 18 BRPM | DIASTOLIC BLOOD PRESSURE: 83 MMHG | WEIGHT: 160 LBS | OXYGEN SATURATION: 98 % | TEMPERATURE: 97.9 F | BODY MASS INDEX: 28.35 KG/M2 | HEART RATE: 92 BPM | SYSTOLIC BLOOD PRESSURE: 140 MMHG | HEIGHT: 63 IN

## 2019-05-20 DIAGNOSIS — R07.89 CHEST TIGHTNESS: ICD-10-CM

## 2019-05-20 DIAGNOSIS — F41.9 ANXIETY: Primary | ICD-10-CM

## 2019-05-20 LAB
ANION GAP SERPL CALCULATED.3IONS-SCNC: 10 MMOL/L (ref 4–13)
BASOPHILS # BLD AUTO: 0.03 THOUSANDS/ΜL (ref 0–0.1)
BASOPHILS NFR BLD AUTO: 1 % (ref 0–1)
BUN SERPL-MCNC: 6 MG/DL (ref 5–25)
CALCIUM SERPL-MCNC: 8.8 MG/DL (ref 8.3–10.1)
CHLORIDE SERPL-SCNC: 93 MMOL/L (ref 100–108)
CO2 SERPL-SCNC: 25 MMOL/L (ref 21–32)
CREAT SERPL-MCNC: 0.86 MG/DL (ref 0.6–1.3)
EOSINOPHIL # BLD AUTO: 0.03 THOUSAND/ΜL (ref 0–0.61)
EOSINOPHIL NFR BLD AUTO: 1 % (ref 0–6)
ERYTHROCYTE [DISTWIDTH] IN BLOOD BY AUTOMATED COUNT: 15 % (ref 11.6–15.1)
GFR SERPL CREATININE-BSD FRML MDRD: 75 ML/MIN/1.73SQ M
GLUCOSE SERPL-MCNC: 114 MG/DL (ref 65–140)
HCT VFR BLD AUTO: 34 % (ref 34.8–46.1)
HGB BLD-MCNC: 11.6 G/DL (ref 11.5–15.4)
IMM GRANULOCYTES # BLD AUTO: 0.01 THOUSAND/UL (ref 0–0.2)
IMM GRANULOCYTES NFR BLD AUTO: 0 % (ref 0–2)
LYMPHOCYTES # BLD AUTO: 0.49 THOUSANDS/ΜL (ref 0.6–4.47)
LYMPHOCYTES NFR BLD AUTO: 13 % (ref 14–44)
MCH RBC QN AUTO: 27 PG (ref 26.8–34.3)
MCHC RBC AUTO-ENTMCNC: 34.1 G/DL (ref 31.4–37.4)
MCV RBC AUTO: 79 FL (ref 82–98)
MONOCYTES # BLD AUTO: 0.35 THOUSAND/ΜL (ref 0.17–1.22)
MONOCYTES NFR BLD AUTO: 9 % (ref 4–12)
NEUTROPHILS # BLD AUTO: 3 THOUSANDS/ΜL (ref 1.85–7.62)
NEUTS SEG NFR BLD AUTO: 76 % (ref 43–75)
NRBC BLD AUTO-RTO: 0 /100 WBCS
PLATELET # BLD AUTO: 277 THOUSANDS/UL (ref 149–390)
PMV BLD AUTO: 8.6 FL (ref 8.9–12.7)
POTASSIUM SERPL-SCNC: 3.5 MMOL/L (ref 3.5–5.3)
RBC # BLD AUTO: 4.3 MILLION/UL (ref 3.81–5.12)
SODIUM SERPL-SCNC: 128 MMOL/L (ref 136–145)
TROPONIN I SERPL-MCNC: <0.02 NG/ML
WBC # BLD AUTO: 3.91 THOUSAND/UL (ref 4.31–10.16)

## 2019-05-20 PROCEDURE — 36415 COLL VENOUS BLD VENIPUNCTURE: CPT | Performed by: EMERGENCY MEDICINE

## 2019-05-20 PROCEDURE — 71046 X-RAY EXAM CHEST 2 VIEWS: CPT

## 2019-05-20 PROCEDURE — 84484 ASSAY OF TROPONIN QUANT: CPT | Performed by: EMERGENCY MEDICINE

## 2019-05-20 PROCEDURE — 85025 COMPLETE CBC W/AUTO DIFF WBC: CPT | Performed by: EMERGENCY MEDICINE

## 2019-05-20 PROCEDURE — 80048 BASIC METABOLIC PNL TOTAL CA: CPT | Performed by: EMERGENCY MEDICINE

## 2019-05-20 PROCEDURE — 99284 EMERGENCY DEPT VISIT MOD MDM: CPT

## 2019-05-21 ENCOUNTER — TELEPHONE (OUTPATIENT)
Dept: FAMILY MEDICINE CLINIC | Facility: CLINIC | Age: 59
End: 2019-05-21

## 2019-05-21 ENCOUNTER — TELEPHONE (OUTPATIENT)
Dept: OTHER | Facility: OTHER | Age: 59
End: 2019-05-21

## 2019-05-21 VITALS
WEIGHT: 174.82 LBS | OXYGEN SATURATION: 97 % | DIASTOLIC BLOOD PRESSURE: 87 MMHG | RESPIRATION RATE: 20 BRPM | HEART RATE: 98 BPM | TEMPERATURE: 98.6 F | BODY MASS INDEX: 30.97 KG/M2 | SYSTOLIC BLOOD PRESSURE: 141 MMHG

## 2019-05-21 DIAGNOSIS — F41.1 GAD (GENERALIZED ANXIETY DISORDER): Chronic | ICD-10-CM

## 2019-05-21 DIAGNOSIS — Z76.0 ENCOUNTER FOR MEDICATION REFILL: ICD-10-CM

## 2019-05-21 DIAGNOSIS — F25.1 SCHIZOAFFECTIVE DISORDER, DEPRESSIVE TYPE (HCC): Primary | Chronic | ICD-10-CM

## 2019-05-21 DIAGNOSIS — F41.9 ANXIETY: Primary | ICD-10-CM

## 2019-05-21 LAB
ATRIAL RATE: 99 BPM
P AXIS: 68 DEGREES
PR INTERVAL: 128 MS
QRS AXIS: 30 DEGREES
QRSD INTERVAL: 78 MS
QT INTERVAL: 340 MS
QTC INTERVAL: 436 MS
T WAVE AXIS: 70 DEGREES
VENTRICULAR RATE: 99 BPM

## 2019-05-21 PROCEDURE — 93010 ELECTROCARDIOGRAM REPORT: CPT | Performed by: INTERNAL MEDICINE

## 2019-05-21 PROCEDURE — 99283 EMERGENCY DEPT VISIT LOW MDM: CPT

## 2019-05-21 PROCEDURE — 99283 EMERGENCY DEPT VISIT LOW MDM: CPT | Performed by: EMERGENCY MEDICINE

## 2019-05-21 RX ORDER — LORAZEPAM 2 MG/1
2 TABLET ORAL EVERY 8 HOURS PRN
Qty: 15 TABLET | Refills: 0 | Status: SHIPPED | OUTPATIENT
Start: 2019-05-21 | End: 2019-05-22

## 2019-05-21 RX ORDER — LORAZEPAM 1 MG/1
2 TABLET ORAL ONCE
Status: COMPLETED | OUTPATIENT
Start: 2019-05-21 | End: 2019-05-21

## 2019-05-21 RX ORDER — QUETIAPINE FUMARATE 400 MG/1
400 TABLET, FILM COATED ORAL
Qty: 15 TABLET | Refills: 0 | Status: SHIPPED | OUTPATIENT
Start: 2019-05-21 | End: 2019-05-22 | Stop reason: SDUPTHER

## 2019-05-21 RX ORDER — ZIPRASIDONE HYDROCHLORIDE 40 MG/1
80 CAPSULE ORAL ONCE
Status: DISCONTINUED | OUTPATIENT
Start: 2019-05-21 | End: 2019-05-21

## 2019-05-21 RX ORDER — ZIPRASIDONE HYDROCHLORIDE 80 MG/1
80 CAPSULE ORAL DAILY
Qty: 15 CAPSULE | Refills: 0 | Status: SHIPPED | OUTPATIENT
Start: 2019-05-21 | End: 2019-05-22 | Stop reason: SDUPTHER

## 2019-05-21 RX ORDER — PAROXETINE 30 MG/1
30 TABLET, FILM COATED ORAL EVERY MORNING
Qty: 15 TABLET | Refills: 0 | Status: SHIPPED | OUTPATIENT
Start: 2019-05-21 | End: 2019-05-22 | Stop reason: SDUPTHER

## 2019-05-21 RX ORDER — ONDANSETRON 4 MG/1
4 TABLET, ORALLY DISINTEGRATING ORAL ONCE
Status: COMPLETED | OUTPATIENT
Start: 2019-05-21 | End: 2019-05-21

## 2019-05-21 RX ORDER — QUETIAPINE FUMARATE 200 MG/1
200 TABLET, FILM COATED ORAL ONCE
Status: COMPLETED | OUTPATIENT
Start: 2019-05-21 | End: 2019-05-21

## 2019-05-21 RX ORDER — ONDANSETRON 4 MG/1
4 TABLET, ORALLY DISINTEGRATING ORAL ONCE
Status: DISCONTINUED | OUTPATIENT
Start: 2019-05-21 | End: 2019-05-22 | Stop reason: HOSPADM

## 2019-05-21 RX ADMIN — LORAZEPAM 2 MG: 1 TABLET ORAL at 22:19

## 2019-05-21 RX ADMIN — ONDANSETRON 4 MG: 4 TABLET, ORALLY DISINTEGRATING ORAL at 22:33

## 2019-05-21 RX ADMIN — QUETIAPINE 200 MG: 200 TABLET, FILM COATED ORAL at 22:14

## 2019-05-22 ENCOUNTER — OFFICE VISIT (OUTPATIENT)
Dept: FAMILY MEDICINE CLINIC | Facility: CLINIC | Age: 59
End: 2019-05-22

## 2019-05-22 VITALS
RESPIRATION RATE: 16 BRPM | HEIGHT: 63 IN | DIASTOLIC BLOOD PRESSURE: 90 MMHG | HEART RATE: 76 BPM | BODY MASS INDEX: 29.48 KG/M2 | SYSTOLIC BLOOD PRESSURE: 140 MMHG | TEMPERATURE: 99.1 F | WEIGHT: 166.4 LBS

## 2019-05-22 DIAGNOSIS — Z76.0 ENCOUNTER FOR MEDICATION REFILL: ICD-10-CM

## 2019-05-22 DIAGNOSIS — F41.9 ANXIETY: Primary | ICD-10-CM

## 2019-05-22 DIAGNOSIS — F25.1 SCHIZOAFFECTIVE DISORDER, DEPRESSIVE TYPE (HCC): Chronic | ICD-10-CM

## 2019-05-22 PROCEDURE — 99213 OFFICE O/P EST LOW 20 MIN: CPT | Performed by: FAMILY MEDICINE

## 2019-05-22 RX ORDER — QUETIAPINE FUMARATE 400 MG/1
400 TABLET, FILM COATED ORAL
Qty: 90 TABLET | Refills: 1 | Status: SHIPPED | OUTPATIENT
Start: 2019-05-22 | End: 2019-06-12 | Stop reason: SDUPTHER

## 2019-05-22 RX ORDER — ZIPRASIDONE HYDROCHLORIDE 80 MG/1
80 CAPSULE ORAL 2 TIMES DAILY
Qty: 180 CAPSULE | Refills: 2 | Status: SHIPPED | OUTPATIENT
Start: 2019-05-22 | End: 2020-01-14 | Stop reason: HOSPADM

## 2019-05-22 RX ORDER — PAROXETINE 30 MG/1
30 TABLET, FILM COATED ORAL EVERY MORNING
Qty: 90 TABLET | Refills: 1 | Status: SHIPPED | OUTPATIENT
Start: 2019-05-22 | End: 2019-10-24 | Stop reason: SDUPTHER

## 2019-05-22 RX ORDER — LORAZEPAM 1 MG/1
0.5 TABLET ORAL EVERY 8 HOURS PRN
Qty: 30 TABLET | Refills: 0 | Status: SHIPPED | OUTPATIENT
Start: 2019-05-22 | End: 2019-06-12 | Stop reason: ALTCHOICE

## 2019-05-25 DIAGNOSIS — E03.9 ACQUIRED HYPOTHYROIDISM: ICD-10-CM

## 2019-05-28 RX ORDER — LEVOTHYROXINE SODIUM 0.03 MG/1
TABLET ORAL
Qty: 30 TABLET | Refills: 0 | Status: SHIPPED | OUTPATIENT
Start: 2019-05-28 | End: 2019-07-05 | Stop reason: SDUPTHER

## 2019-05-29 ENCOUNTER — PATIENT OUTREACH (OUTPATIENT)
Dept: OBGYN CLINIC | Facility: CLINIC | Age: 59
End: 2019-05-29

## 2019-05-29 ENCOUNTER — TELEPHONE (OUTPATIENT)
Dept: FAMILY MEDICINE CLINIC | Facility: CLINIC | Age: 59
End: 2019-05-29

## 2019-05-30 ENCOUNTER — PATIENT OUTREACH (OUTPATIENT)
Dept: OBGYN CLINIC | Facility: CLINIC | Age: 59
End: 2019-05-30

## 2019-06-12 ENCOUNTER — OFFICE VISIT (OUTPATIENT)
Dept: INTERNAL MEDICINE CLINIC | Facility: CLINIC | Age: 59
End: 2019-06-12
Payer: COMMERCIAL

## 2019-06-12 VITALS
TEMPERATURE: 98.7 F | WEIGHT: 160.8 LBS | HEART RATE: 98 BPM | BODY MASS INDEX: 28.49 KG/M2 | SYSTOLIC BLOOD PRESSURE: 164 MMHG | DIASTOLIC BLOOD PRESSURE: 102 MMHG | HEIGHT: 63 IN | OXYGEN SATURATION: 96 %

## 2019-06-12 DIAGNOSIS — F41.9 ANXIETY: ICD-10-CM

## 2019-06-12 DIAGNOSIS — Z11.59 NEED FOR HEPATITIS C SCREENING TEST: ICD-10-CM

## 2019-06-12 DIAGNOSIS — Z76.0 ENCOUNTER FOR MEDICATION REFILL: ICD-10-CM

## 2019-06-12 DIAGNOSIS — I10 ESSENTIAL HYPERTENSION: ICD-10-CM

## 2019-06-12 DIAGNOSIS — Z12.39 SCREENING FOR BREAST CANCER: ICD-10-CM

## 2019-06-12 DIAGNOSIS — E03.9 ACQUIRED HYPOTHYROIDISM: ICD-10-CM

## 2019-06-12 DIAGNOSIS — F25.1 SCHIZOAFFECTIVE DISORDER, DEPRESSIVE TYPE (HCC): Primary | Chronic | ICD-10-CM

## 2019-06-12 PROCEDURE — 99215 OFFICE O/P EST HI 40 MIN: CPT | Performed by: INTERNAL MEDICINE

## 2019-06-12 RX ORDER — QUETIAPINE FUMARATE 400 MG/1
400 TABLET, FILM COATED ORAL
Qty: 30 TABLET | Refills: 0 | Status: SHIPPED | OUTPATIENT
Start: 2019-06-12 | End: 2019-07-06 | Stop reason: SDUPTHER

## 2019-06-12 RX ORDER — LORAZEPAM 1 MG/1
1 TABLET ORAL EVERY 8 HOURS PRN
Qty: 90 TABLET | Refills: 0 | Status: SHIPPED | OUTPATIENT
Start: 2019-06-12 | End: 2019-07-05 | Stop reason: SDUPTHER

## 2019-06-17 ENCOUNTER — TELEPHONE (OUTPATIENT)
Dept: RADIOLOGY | Facility: CLINIC | Age: 59
End: 2019-06-17

## 2019-06-17 ENCOUNTER — TELEPHONE (OUTPATIENT)
Dept: BEHAVIORAL/MENTAL HEALTH CLINIC | Facility: CLINIC | Age: 59
End: 2019-06-17

## 2019-06-17 ENCOUNTER — TELEPHONE (OUTPATIENT)
Dept: INTERNAL MEDICINE CLINIC | Facility: CLINIC | Age: 59
End: 2019-06-17

## 2019-06-19 ENCOUNTER — TELEPHONE (OUTPATIENT)
Dept: PAIN MEDICINE | Facility: MEDICAL CENTER | Age: 59
End: 2019-06-19

## 2019-07-05 ENCOUNTER — OFFICE VISIT (OUTPATIENT)
Dept: INTERNAL MEDICINE CLINIC | Facility: CLINIC | Age: 59
End: 2019-07-05
Payer: COMMERCIAL

## 2019-07-05 VITALS
BODY MASS INDEX: 27.46 KG/M2 | DIASTOLIC BLOOD PRESSURE: 100 MMHG | HEART RATE: 96 BPM | SYSTOLIC BLOOD PRESSURE: 170 MMHG | WEIGHT: 155 LBS | HEIGHT: 63 IN

## 2019-07-05 DIAGNOSIS — K21.9 GASTROESOPHAGEAL REFLUX DISEASE, ESOPHAGITIS PRESENCE NOT SPECIFIED: ICD-10-CM

## 2019-07-05 DIAGNOSIS — I10 ESSENTIAL HYPERTENSION: ICD-10-CM

## 2019-07-05 DIAGNOSIS — E03.9 ACQUIRED HYPOTHYROIDISM: ICD-10-CM

## 2019-07-05 DIAGNOSIS — F41.9 ANXIETY: Primary | ICD-10-CM

## 2019-07-05 PROCEDURE — 3008F BODY MASS INDEX DOCD: CPT | Performed by: INTERNAL MEDICINE

## 2019-07-05 PROCEDURE — 99214 OFFICE O/P EST MOD 30 MIN: CPT | Performed by: INTERNAL MEDICINE

## 2019-07-05 PROCEDURE — 1036F TOBACCO NON-USER: CPT | Performed by: INTERNAL MEDICINE

## 2019-07-05 RX ORDER — LORAZEPAM 2 MG/1
2 TABLET ORAL EVERY 8 HOURS PRN
Qty: 90 TABLET | Refills: 1 | Status: SHIPPED | OUTPATIENT
Start: 2019-07-05 | End: 2019-09-05 | Stop reason: SDUPTHER

## 2019-07-05 NOTE — ASSESSMENT & PLAN NOTE
Will see if the elevated blood pressure is due to increased stress levels, and if blood pressure comes down with better control of anxiety and stress    Continue monitoring

## 2019-07-05 NOTE — ASSESSMENT & PLAN NOTE
With patient being on 40 mg twice a day of omeprazole, I again recommended trying to lower the dose to once a day, patient reports she has rapid return of her GERD symptoms on once a day  Due to this, I recommend evaluation with GI since patient complaining of GERD symptoms despite high-dose PPI therapy  I wonder patient may be a candidate for some type of procedure such as Dash fundoplication

## 2019-07-05 NOTE — PATIENT INSTRUCTIONS
Problem List Items Addressed This Visit        Digestive    Esophageal reflux     With patient being on 40 mg twice a day of omeprazole, I again recommended trying to lower the dose to once a day, patient reports she has rapid return of her GERD symptoms on once a day  Due to this, I recommend evaluation with GI since patient complaining of GERD symptoms despite high-dose PPI therapy  I wonder patient may be a candidate for some type of procedure such as Dash fundoplication  Relevant Orders    Ambulatory referral to Gastroenterology       Endocrine    Acquired hypothyroidism     Last thyroid function tests in May were normal, continue levothyroxine at 25 mcg daily  Cardiovascular and Mediastinum    Essential hypertension     Will see if the elevated blood pressure is due to increased stress levels, and if blood pressure comes down with better control of anxiety and stress  Continue monitoring            Other    Anxiety - Primary     Pt has appointment with psychiatry in September  Pt did try using the lower dose of lorazepam at 1 mg 3 times a day instead of 2 mg 3 times a day, but she reports this was not effective for her and requests to go back to her chronic dosing regimen of 2 mg 3 times a day which she has been on for over 30 years with good results on controlling her anxiety which is currently uncontrolled  Will increase lorazepam back to 2 mg 3 times a day, and discussed with patient she can continue to try to titrate to the lowest effective dose, for example she could try using up her 1 mg tablets by trying 1 and half tablets 3 times a day which would be 1 5 mg 3 times a day instead of 2 mg 3 times a day  Patient also encouraged to follow up with Psychiatry, and continue her current meds for anxiety, and to continue to pursue changes in the home life which could help reduce the stressful situation by working with the            Relevant Medications    LORazepam (ATIVAN) 2 mg tablet
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
## 2019-07-05 NOTE — ASSESSMENT & PLAN NOTE
Pt has appointment with psychiatry in September  Pt did try using the lower dose of lorazepam at 1 mg 3 times a day instead of 2 mg 3 times a day, but she reports this was not effective for her and requests to go back to her chronic dosing regimen of 2 mg 3 times a day which she has been on for over 30 years with good results on controlling her anxiety which is currently uncontrolled  Will increase lorazepam back to 2 mg 3 times a day, and discussed with patient she can continue to try to titrate to the lowest effective dose, for example she could try using up her 1 mg tablets by trying 1 and half tablets 3 times a day which would be 1 5 mg 3 times a day instead of 2 mg 3 times a day  Patient also encouraged to follow up with Psychiatry, and continue her current meds for anxiety, and to continue to pursue changes in the home life which could help reduce the stressful situation by working with the

## 2019-07-06 DIAGNOSIS — Z76.0 ENCOUNTER FOR MEDICATION REFILL: ICD-10-CM

## 2019-07-08 RX ORDER — QUETIAPINE FUMARATE 400 MG/1
400 TABLET, FILM COATED ORAL
Qty: 30 TABLET | Refills: 5 | Status: SHIPPED | OUTPATIENT
Start: 2019-07-08 | End: 2019-11-07 | Stop reason: SDUPTHER

## 2019-07-10 ENCOUNTER — HOSPITAL ENCOUNTER (OUTPATIENT)
Dept: RADIOLOGY | Facility: CLINIC | Age: 59
Discharge: HOME | End: 2019-07-10
Admitting: ANESTHESIOLOGY
Payer: COMMERCIAL

## 2019-07-10 VITALS
SYSTOLIC BLOOD PRESSURE: 146 MMHG | HEART RATE: 75 BPM | OXYGEN SATURATION: 96 % | RESPIRATION RATE: 20 BRPM | TEMPERATURE: 98 F | DIASTOLIC BLOOD PRESSURE: 89 MMHG

## 2019-07-10 DIAGNOSIS — M47.816 LUMBAR SPONDYLOSIS: ICD-10-CM

## 2019-07-10 PROCEDURE — 64493 INJ PARAVERT F JNT L/S 1 LEV: CPT | Performed by: ANESTHESIOLOGY

## 2019-07-10 PROCEDURE — 64494 INJ PARAVERT F JNT L/S 2 LEV: CPT | Performed by: ANESTHESIOLOGY

## 2019-07-10 RX ORDER — LIDOCAINE HYDROCHLORIDE 10 MG/ML
10 INJECTION, SOLUTION EPIDURAL; INFILTRATION; INTRACAUDAL; PERINEURAL ONCE
Status: COMPLETED | OUTPATIENT
Start: 2019-07-10 | End: 2019-07-10

## 2019-07-10 RX ADMIN — LIDOCAINE HYDROCHLORIDE 8 ML: 10 INJECTION, SOLUTION EPIDURAL; INFILTRATION; INTRACAUDAL; PERINEURAL at 09:51

## 2019-07-10 RX ADMIN — LIDOCAINE HYDROCHLORIDE 3 ML: 20 INJECTION, SOLUTION EPIDURAL; INFILTRATION; INTRACAUDAL; PERINEURAL at 09:57

## 2019-07-10 NOTE — H&P
History of Present Illness: The patient is a 62 y o  female who presents with complaints of low back pain  Patient Active Problem List   Diagnosis    Hyponatremia    Lumbar radiculopathy    DDD (degenerative disc disease), lumbar    Spondylolisthesis at L4-L5 level    Localized osteoporosis without current pathological fracture    URI (upper respiratory infection)    Spinal stenosis of lumbar region with neurogenic claudication    Lumbar spondylosis    T12 compression fracture (HCC)    Synovial cyst of lumbar facet joint    Anxiety    Bulimia nervosa in remission    Constipation    Esophageal reflux    Hyperlipidemia    Acquired hypothyroidism    Other fatigue    Serotonin syndrome    Leukopenia    Schizoaffective disorder, depressive type (HCC)    ABIMAEL (generalized anxiety disorder)    Flank pain    Dermal hypersensitivity reaction    Dysuria    Viral upper respiratory tract infection    Elevated BP without diagnosis of hypertension    Essential hypertension       Past Medical History:   Diagnosis Date    Anxiety     Back pain at L4-L5 level     Schreiber esophagus     Bulimia     Disease of thyroid gland     hypothyroid    GERD (gastroesophageal reflux disease)     Hyperlipidemia     Hypothyroidism     Leukopenia     Sciatica     Seizure (HCC)     Synovial cyst of lumbar spine     Vertigo     Weight gain        Past Surgical History:   Procedure Laterality Date    BONE MARROW BIOPSY      BREAST SURGERY      enlargement     RHINOPLASTY           Current Outpatient Medications:     calcium carbonate-vitamin D (OSCAL-D) 500 mg-200 units per tablet, TAKE 1 TABLET BY MOUTH 2 (TWO) TIMES A DAY WITH MEALS FOR 30 DAYS, Disp: 60 tablet, Rfl: 0    ferrous sulfate 325 (65 Fe) mg tablet, TAKE 1 TABLET (325 MG TOTAL) BY MOUTH 2 (TWO) TIMES A DAY FOR 90 DAYS, Disp: 180 tablet, Rfl: 0    levothyroxine 25 mcg tablet, Take 25 mcg by mouth daily  , Disp: , Rfl:     LORazepam (ATIVAN) 2 mg tablet, Take 1 tablet (2 mg total) by mouth every 8 (eight) hours as needed for anxiety, Disp: 90 tablet, Rfl: 1    omeprazole (PriLOSEC) 40 MG capsule, Take 80 mg by mouth 2 (two) times a day  , Disp: , Rfl:     PARoxetine (PAXIL) 30 mg tablet, Take 1 tablet (30 mg total) by mouth every morning, Disp: 90 tablet, Rfl: 1    QUEtiapine (SEROquel) 400 MG tablet, TAKE 1 TABLET (400 MG TOTAL) BY MOUTH DAILY AT BEDTIME, Disp: 30 tablet, Rfl: 5    ziprasidone (GEODON) 80 mg capsule, Take 1 capsule (80 mg total) by mouth 2 (two) times a day, Disp: 180 capsule, Rfl: 2    Allergies   Allergen Reactions    Latex     Risperdal [Risperidone]     Zyprexa [Olanzapine]        Physical Exam:   Vitals:    07/10/19 0936   BP: 149/95   Pulse: 94   Resp: 20   Temp: 98 °F (36 7 °C)   SpO2: 94%     General: Awake, Alert, Oriented x 3, Mood and affect appropriate  Respiratory: Respirations even and unlabored  Cardiovascular: Peripheral pulses intact; no edema  Musculoskeletal Exam:  Bilateral lumbar paraspinals tender to palpation  ASA Score: 2    Patient/Chart Verification  Patient ID Verified: Verbal  ID Band Applied: No  Consents Confirmed: Procedural  H&P( within 30 days) Verified: To be obtained in the Pre-Procedure area  Interval H&P(within 24 hr) Complete (required for Outpatients and Surgery Admit only): To be obtained in the Pre-Procedure area  Allergies Reviewed: Yes  Anticoag/NSAID held?: No  Currently on antibiotics?: No    Assessment:   1   Lumbar spondylosis        Plan: B/L L3-5 MBB

## 2019-07-10 NOTE — DISCHARGE INSTR - LAB

## 2019-07-15 ENCOUNTER — APPOINTMENT (OUTPATIENT)
Dept: RADIOLOGY | Age: 59
End: 2019-07-15
Payer: COMMERCIAL

## 2019-07-15 ENCOUNTER — OFFICE VISIT (OUTPATIENT)
Dept: INTERNAL MEDICINE CLINIC | Facility: CLINIC | Age: 59
End: 2019-07-15
Payer: COMMERCIAL

## 2019-07-15 VITALS
SYSTOLIC BLOOD PRESSURE: 143 MMHG | TEMPERATURE: 98.5 F | WEIGHT: 163 LBS | BODY MASS INDEX: 28.88 KG/M2 | HEIGHT: 63 IN | DIASTOLIC BLOOD PRESSURE: 96 MMHG | OXYGEN SATURATION: 96 % | HEART RATE: 90 BPM

## 2019-07-15 DIAGNOSIS — M25.551 RIGHT HIP PAIN: ICD-10-CM

## 2019-07-15 DIAGNOSIS — E03.9 ACQUIRED HYPOTHYROIDISM: ICD-10-CM

## 2019-07-15 DIAGNOSIS — R30.0 DYSURIA: ICD-10-CM

## 2019-07-15 DIAGNOSIS — M25.551 RIGHT HIP PAIN: Primary | ICD-10-CM

## 2019-07-15 DIAGNOSIS — I10 ESSENTIAL HYPERTENSION: ICD-10-CM

## 2019-07-15 PROCEDURE — 99214 OFFICE O/P EST MOD 30 MIN: CPT | Performed by: INTERNAL MEDICINE

## 2019-07-15 PROCEDURE — 73502 X-RAY EXAM HIP UNI 2-3 VIEWS: CPT

## 2019-07-15 NOTE — PATIENT INSTRUCTIONS
Problem List Items Addressed This Visit        Endocrine    Acquired hypothyroidism     Continue levothyroxine at 25 mcg daily            Cardiovascular and Mediastinum    Essential hypertension     Slightly elevated today, continue monitoring            Other    Right hip pain - Primary     Will check x-ray of the right hip         Relevant Orders    XR hip/pelv 2-3 vws right if performed

## 2019-07-15 NOTE — PROGRESS NOTES
Assessment/Plan:    Acquired hypothyroidism  Continue levothyroxine at 25 mcg daily    Essential hypertension  Slightly elevated today, continue monitoring    Right hip pain  Will check x-ray of the right hip       Diagnoses and all orders for this visit:    Right hip pain  -     XR hip/pelv 2-3 vws right if performed; Future    Acquired hypothyroidism    Essential hypertension    Other orders  -     Cancel: Ambulatory referral to Gynecology; Future          Subjective:      Patient ID: Jesica Rao is a 62 y o  female  Anxiety: stressor of mother has settled down some now that pt's mother is taking sertraline    Back pain: pt is seeing pain management  Pt is worried that she may need a hip replacement on the right  Patient has pain with weight-bearing on the right hip    Hypothyroidism:  Patient reports compliance with levothyroxine          The following portions of the patient's history were reviewed and updated as appropriate: allergies, current medications, past family history, past medical history, past social history, past surgical history and problem list     Review of Systems   Constitutional: Negative for chills, fatigue and fever  HENT: Negative for congestion, nosebleeds, postnasal drip, sore throat and trouble swallowing  Eyes: Negative for pain  Respiratory: Negative for cough, chest tightness, shortness of breath and wheezing  Cardiovascular: Negative for chest pain, palpitations and leg swelling  Gastrointestinal: Negative for abdominal pain, constipation, diarrhea, nausea and vomiting  Endocrine: Negative for polydipsia and polyuria  Genitourinary: Negative for dysuria, flank pain and hematuria  Musculoskeletal: Positive for arthralgias  Skin: Negative for rash  Neurological: Negative for dizziness, tremors and headaches  Hematological: Does not bruise/bleed easily  Psychiatric/Behavioral: Negative for confusion and dysphoric mood   The patient is not nervous/anxious  Objective:      /96   Pulse 90   Temp 98 5 °F (36 9 °C)   Ht 5' 3" (1 6 m)   Wt 73 9 kg (163 lb)   SpO2 96%   BMI 28 87 kg/m²          Physical Exam   Constitutional: She is oriented to person, place, and time  She appears well-developed and well-nourished  No distress  HENT:   Head: Normocephalic and atraumatic  Right Ear: External ear normal    Left Ear: External ear normal    Eyes: Conjunctivae are normal  No scleral icterus  Neck: Normal range of motion  Neck supple  No tracheal deviation present  No thyromegaly present  Cardiovascular: Normal rate, regular rhythm and normal heart sounds  No murmur heard  Pulmonary/Chest: Effort normal and breath sounds normal  No respiratory distress  She has no wheezes  She has no rales  Abdominal: Soft  Bowel sounds are normal  There is no tenderness  There is no rebound and no guarding  Musculoskeletal: She exhibits no edema  Straight leg raise test negative bilaterally   Lymphadenopathy:     She has no cervical adenopathy  Neurological: She is alert and oriented to person, place, and time  Psychiatric: She has a normal mood and affect  Her behavior is normal  Judgment and thought content normal    Vitals reviewed

## 2019-07-16 DIAGNOSIS — R39.9 UTI SYMPTOMS: Primary | ICD-10-CM

## 2019-07-16 RX ORDER — SULFAMETHOXAZOLE AND TRIMETHOPRIM 800; 160 MG/1; MG/1
1 TABLET ORAL EVERY 12 HOURS SCHEDULED
Qty: 14 TABLET | Refills: 0 | Status: SHIPPED | OUTPATIENT
Start: 2019-07-16 | End: 2019-07-23

## 2019-07-17 ENCOUNTER — TELEPHONE (OUTPATIENT)
Dept: RADIOLOGY | Facility: CLINIC | Age: 59
End: 2019-07-17

## 2019-07-17 ENCOUNTER — TELEPHONE (OUTPATIENT)
Dept: INTERNAL MEDICINE CLINIC | Facility: CLINIC | Age: 59
End: 2019-07-17

## 2019-07-17 NOTE — TELEPHONE ENCOUNTER
Let patient know that I looked also, and results are not available yet, and I will call as soon as they are available

## 2019-07-17 NOTE — TELEPHONE ENCOUNTER
Pain diary received on 07/16/19- Pt calling asking for next step, can you please review and let me know if we are going to schedule RFA?  (only one MBB needed bc she had this done last year)

## 2019-07-17 NOTE — TELEPHONE ENCOUNTER
The patient had a variable response to bilateral L3, L4, L5 medial branch block  Please ask the patient if she had enough relief to move forward with repeat RFA    If the patient would like to discuss this further please schedule follow-up office visit

## 2019-07-18 DIAGNOSIS — M16.11 ARTHRITIS OF RIGHT HIP: Primary | ICD-10-CM

## 2019-07-18 LAB
APPEARANCE UR: CLEAR
BACTERIA UR QL AUTO: ABNORMAL /HPF
BILIRUB UR QL STRIP: NEGATIVE
COLOR UR: YELLOW
GLUCOSE UR QL STRIP: NEGATIVE
HCV AB S/CO SERPL IA: 0.01
HCV AB SERPL QL IA: NORMAL
HGB UR QL STRIP: NEGATIVE
HYALINE CASTS #/AREA URNS LPF: ABNORMAL /LPF
KETONES UR QL STRIP: NEGATIVE
LEUKOCYTE ESTERASE UR QL STRIP: ABNORMAL
NITRITE UR QL STRIP: NEGATIVE
PH UR STRIP: 6.5 [PH] (ref 5–8)
PROT UR QL STRIP: NEGATIVE
RBC #/AREA URNS HPF: ABNORMAL /HPF
SP GR UR STRIP: 1 (ref 1–1.03)
SQUAMOUS #/AREA URNS HPF: ABNORMAL /HPF
WBC #/AREA URNS HPF: ABNORMAL /HPF

## 2019-07-18 NOTE — TELEPHONE ENCOUNTER
S/w pt who states that she did not get the pain relief that she had last time w/ the MBB  Pt states that this pain is "different" than anything she has experienced prior  Pt states that the pain is in her R hip/leg, and she can barely walk  Per pt she would rather schedule an OV to investigate further  Pt was scheduled for OV w/ [facility] [place] on 7/30, arrival 830 am      Pt asking if there is an x-ray or anything that she could get done prior to her visit w/ [facility] [place]  Pt was advised that RN will send this information to Dr Pérez Sánchez and c/b w/ recommendations regarding imaging  Pt appreciative  Please advise  Thank you

## 2019-07-18 NOTE — TELEPHONE ENCOUNTER
Aware   The patient does not need any imaging prior to office visit as she will be evaluated at that office visit to determine what imaging is required if any

## 2019-07-18 NOTE — TELEPHONE ENCOUNTER
ROBBIE    S/w pt and advised of Dr Tuan Kuo notation  Pt verbalized understanding and states that she actually forgot to mention that her PCP Dr Jose Manuel Morrison actually ordered an xray of her hip/pelvis and she had that done on this past Monday  Pt was advised that RN will notify [facility] of the xray so she can review it before OV  Pt appreciative  Confirmed f/u OV w/ [facility]

## 2019-07-19 ENCOUNTER — TELEPHONE (OUTPATIENT)
Dept: RADIOLOGY | Facility: CLINIC | Age: 59
End: 2019-07-19

## 2019-07-19 NOTE — TELEPHONE ENCOUNTER
The patient needs to be seen in the office to have her hip evaluated before I can make any recommendations

## 2019-07-19 NOTE — TELEPHONE ENCOUNTER
Patient waiting for RFA but has "tremendous RT hip pain"    I offered her an appointment but she wants to know options and talk to someone clinical

## 2019-07-22 ENCOUNTER — HOSPITAL ENCOUNTER (EMERGENCY)
Facility: HOSPITAL | Age: 59
Discharge: HOME | End: 2019-07-22
Attending: EMERGENCY MEDICINE | Admitting: EMERGENCY MEDICINE
Payer: COMMERCIAL

## 2019-07-22 ENCOUNTER — TELEPHONE (OUTPATIENT)
Dept: INTERNAL MEDICINE CLINIC | Facility: CLINIC | Age: 59
End: 2019-07-22

## 2019-07-22 VITALS
TEMPERATURE: 98.1 F | DIASTOLIC BLOOD PRESSURE: 56 MMHG | HEART RATE: 89 BPM | SYSTOLIC BLOOD PRESSURE: 99 MMHG | RESPIRATION RATE: 16 BRPM | OXYGEN SATURATION: 95 %

## 2019-07-22 DIAGNOSIS — R26.2 AMBULATORY DYSFUNCTION: ICD-10-CM

## 2019-07-22 DIAGNOSIS — M16.11 PRIMARY OSTEOARTHRITIS OF RIGHT HIP: Primary | ICD-10-CM

## 2019-07-22 PROCEDURE — 99283 EMERGENCY DEPT VISIT LOW MDM: CPT

## 2019-07-22 PROCEDURE — 99283 EMERGENCY DEPT VISIT LOW MDM: CPT | Performed by: EMERGENCY MEDICINE

## 2019-07-22 NOTE — ED PROVIDER NOTES
History  Chief Complaint   Patient presents with    Hip Pain     Pt c/o right sided hip pain x3 weeks  Pt denies taking any pain medication PTA  History provided by:  Patient  Hip Pain   Location:  Right hip  Quality:  Dull  Severity:  Moderate  Onset quality:  Gradual  Duration:  3 weeks  Timing:  Constant  Progression:  Unchanged  Chronicity:  New  Context:  Long history of hip pain, just had it evaluated for the 1st time few weeks ago by PCP was found have bone-on-bone arthritis, worsening pain prompting ED visit  Relieved by:  Resting/sitting down  Worsened by:  Ambulating even with her walker  Ineffective treatments:  Patient only taking Tylenol  Associated symptoms: no abdominal pain, no chest pain, no congestion, no cough, no diarrhea, no fever, no headaches, no nausea, no rash, no shortness of breath, no sore throat, no vomiting and no wheezing    Associated symptoms comment:  Patient denies any other symptoms  On my examination though she does seem very drowsy  , she says she is always like this due to her psychiatric medications and her Ativan  Prior to Admission Medications   Prescriptions Last Dose Informant Patient Reported? Taking? LORazepam (ATIVAN) 2 mg tablet   No No   Sig: Take 1 tablet (2 mg total) by mouth every 8 (eight) hours as needed for anxiety   PARoxetine (PAXIL) 30 mg tablet   No No   Sig: Take 1 tablet (30 mg total) by mouth every morning   QUEtiapine (SEROquel) 400 MG tablet   No No   Sig: TAKE 1 TABLET (400 MG TOTAL) BY MOUTH DAILY AT BEDTIME   calcium carbonate-vitamin D (OSCAL-D) 500 mg-200 units per tablet  Self No No   Sig: TAKE 1 TABLET BY MOUTH 2 (TWO) TIMES A DAY WITH MEALS FOR 30 DAYS   ferrous sulfate 325 (65 Fe) mg tablet  Self No No   Sig: TAKE 1 TABLET (325 MG TOTAL) BY MOUTH 2 (TWO) TIMES A DAY FOR 90 DAYS   levothyroxine 25 mcg tablet  Self Yes No   Sig: Take 25 mcg by mouth daily     omeprazole (PriLOSEC) 40 MG capsule  Self Yes No   Sig: Take 80 mg by mouth 2 (two) times a day     sulfamethoxazole-trimethoprim (BACTRIM DS) 800-160 mg per tablet   No No   Sig: Take 1 tablet by mouth every 12 (twelve) hours for 7 days   ziprasidone (GEODON) 80 mg capsule   No No   Sig: Take 1 capsule (80 mg total) by mouth 2 (two) times a day      Facility-Administered Medications: None       Past Medical History:   Diagnosis Date    Anxiety     Back pain at L4-L5 level     Schreiber esophagus     Bulimia     Disease of thyroid gland     hypothyroid    GERD (gastroesophageal reflux disease)     Hyperlipidemia     Hypothyroidism     Leukopenia     Sciatica     Seizure (HCC)     Synovial cyst of lumbar spine     Vertigo     Weight gain        Past Surgical History:   Procedure Laterality Date    BONE MARROW BIOPSY      BREAST SURGERY      enlargement     RHINOPLASTY         Family History   Problem Relation Age of Onset    Uterine cancer Mother     Esophageal cancer Father     Colon cancer Maternal Grandmother      I have reviewed and agree with the history as documented  Social History     Tobacco Use    Smoking status: Never Smoker    Smokeless tobacco: Never Used   Substance Use Topics    Alcohol use: Never     Frequency: Never    Drug use: No        Review of Systems   Constitutional: Negative for activity change, chills, diaphoresis and fever  HENT: Negative for congestion, sinus pressure and sore throat  Eyes: Negative for pain and visual disturbance  Respiratory: Negative for cough, chest tightness, shortness of breath, wheezing and stridor  Cardiovascular: Negative for chest pain and palpitations  Gastrointestinal: Negative for abdominal distention, abdominal pain, constipation, diarrhea, nausea and vomiting  Genitourinary: Negative for dysuria and frequency  Musculoskeletal: Negative for neck pain and neck stiffness  Skin: Negative for rash     Neurological: Negative for dizziness, speech difficulty, light-headedness, numbness and headaches  Physical Exam  Physical Exam   Constitutional: She is oriented to person, place, and time  She appears well-developed  No distress  HENT:   Head: Normocephalic and atraumatic  Eyes: Pupils are equal, round, and reactive to light  Neck: Normal range of motion  Neck supple  No tracheal deviation present  Cardiovascular: Normal rate, regular rhythm, normal heart sounds and intact distal pulses  No murmur heard  Pulmonary/Chest: Effort normal and breath sounds normal  No stridor  No respiratory distress  Abdominal: Soft  She exhibits no distension  There is no tenderness  There is no rebound and no guarding  Musculoskeletal: Normal range of motion  Right hip full passive and active range of motion  She does have some discomfort with active flexion of the hip was able to do it without any difficulty  Able ambulate with the assistance of a walker without any difficulty  Neurological: She is alert and oriented to person, place, and time  Skin: Skin is warm and dry  She is not diaphoretic  No erythema  No pallor  Psychiatric: She has a normal mood and affect  Vitals reviewed        Vital Signs  ED Triage Vitals [07/22/19 1320]   Temperature Pulse Respirations Blood Pressure SpO2   98 1 °F (36 7 °C) 89 16 99/56 95 %      Temp Source Heart Rate Source Patient Position - Orthostatic VS BP Location FiO2 (%)   Oral Monitor Sitting Left arm --      Pain Score       Worst Possible Pain           Vitals:    07/22/19 1320   BP: 99/56   Pulse: 89   Patient Position - Orthostatic VS: Sitting         Visual Acuity      ED Medications  Medications - No data to display    Diagnostic Studies  Results Reviewed     None                 No orders to display              Procedures  Procedures       ED Course                               MDM  Number of Diagnoses or Management Options  Ambulatory dysfunction: new and requires workup  Primary osteoarthritis of right hip: new and requires workup  Diagnosis management comments: 59-year-old female known arthritis of the right hip  Only taking Tylenol, on large dose Ativan and seem to bit drowsy on examination, denies any overdose  , clinically does not seem to have any respiratory depression, just drowsy  Long conversation with her regarding her medications, explained because of the benzodiazepines, opioids are contraindicated, will attempt a Voltaren gel, outpatient orthopedic follow-up  Patient has already contacted outpatient pain management has an appointment next month       Amount and/or Complexity of Data Reviewed  Review and summarize past medical records: yes        Disposition  Final diagnoses:   Primary osteoarthritis of right hip   Ambulatory dysfunction     Time reflects when diagnosis was documented in both MDM as applicable and the Disposition within this note     Time User Action Codes Description Comment    7/22/2019  2:18 PM Glenora Macadamia Add [M16 11] Primary osteoarthritis of right hip     7/22/2019  2:18 PM Glenora Macadamia Add [R26 2] Ambulatory dysfunction       ED Disposition     ED Disposition Condition Date/Time Comment    Discharge Stable Mon Jul 22, 2019  2:17 PM [place] [facility] discharge to home/self care  Follow-up Information     Follow up With Specialties Details Why Contact Info Additional [facility] Call today To arrange for the next available appointment [address] [place] [id number]  [address], [phone]          Patient's Medications   Discharge Prescriptions    DICLOFENAC SODIUM (VOLTAREN) 1 %    Apply 2 g topically 4 (four) times a day       Start Date: 7/22/2019 End Date: --       Order Dose: 2 g       Quantity: 100 g    Refills: 0     No discharge procedures on file      ED Provider  Electronically Signed by           Caitie Alcantar, DO  07/22/19 7271

## 2019-07-22 NOTE — TELEPHONE ENCOUNTER
Called pt and LMOM for her to cb to schedule an office visit for RT HIP PAIN  Gave main phone number bc I do not need to speak to pt, can just be scheduled for an OV

## 2019-07-22 NOTE — TELEPHONE ENCOUNTER
Pt requesting a doctors referral to [facility] - Dr Malika Kenney    Fax: [phone]

## 2019-07-24 ENCOUNTER — TELEPHONE (OUTPATIENT)
Dept: PAIN MEDICINE | Facility: MEDICAL CENTER | Age: 59
End: 2019-07-24

## 2019-07-24 NOTE — TELEPHONE ENCOUNTER
HARESHI- returned call to pt  She said no one is giving her any information about whether we can see her for her hip pain, I reminded pt that she called on Fri 07/19/19 to first ask, and that we called her back on Mon 07/22/19 to discuss, and that she is already scheduled for an OV with Nata to discuss her hip pain on Tues 07/30/19 arr at 08:30  Pt said that when she was on the phone the other day with someone at our office, we kept telling her that we didn't know if there is an injection for hip pain, there is no documentation of that conversation  Pt is concerned that Geo Hahn will not be able to address hip pain, I assured pt that Geo Hahn certainly can come up with a treatment plan once she's seen in the office  Pt confirmed appt on Tues 07/30  I also saw that patient requested a referral to [facility] from her PCP, and they did fax that referral to [place] [address] office  I asked pt if she was planning to schedule there and she said that it depends on how the appt with Geo Hahn goes on Tuesday  Confirmed that pt has no other questions at this time for me, and that she is clear on the plan to see Geo Hahn, pt confirmed and verbalized understanding  *pt did just have B/L L3, L4, L5 MBB done on 07/10/19- that is on hold until after the [address] on 07/30/19

## 2019-07-24 NOTE — TELEPHONE ENCOUNTER
Ezra Moran      7/24/19 11:13 AM   Note      Pt called asking for a callback from scheduling          Pt can be reached at [phone]

## 2019-07-30 ENCOUNTER — OFFICE VISIT (OUTPATIENT)
Dept: PAIN MEDICINE | Facility: CLINIC | Age: 59
End: 2019-07-30
Payer: COMMERCIAL

## 2019-07-30 VITALS
HEIGHT: 63 IN | HEART RATE: 87 BPM | BODY MASS INDEX: 28.35 KG/M2 | DIASTOLIC BLOOD PRESSURE: 86 MMHG | WEIGHT: 160 LBS | SYSTOLIC BLOOD PRESSURE: 132 MMHG

## 2019-07-30 DIAGNOSIS — M51.36 DDD (DEGENERATIVE DISC DISEASE), LUMBAR: ICD-10-CM

## 2019-07-30 DIAGNOSIS — M54.16 LUMBAR RADICULOPATHY: ICD-10-CM

## 2019-07-30 DIAGNOSIS — M47.816 LUMBAR SPONDYLOSIS: ICD-10-CM

## 2019-07-30 DIAGNOSIS — M48.062 SPINAL STENOSIS OF LUMBAR REGION WITH NEUROGENIC CLAUDICATION: ICD-10-CM

## 2019-07-30 DIAGNOSIS — M25.551 RIGHT HIP PAIN: ICD-10-CM

## 2019-07-30 DIAGNOSIS — M16.11 PRIMARY OSTEOARTHRITIS OF RIGHT HIP: Primary | ICD-10-CM

## 2019-07-30 DIAGNOSIS — M43.16 SPONDYLOLISTHESIS AT L4-L5 LEVEL: ICD-10-CM

## 2019-07-30 PROCEDURE — 99214 OFFICE O/P EST MOD 30 MIN: CPT | Performed by: NURSE PRACTITIONER

## 2019-07-30 NOTE — PROGRESS NOTES
Assessment:  1  Primary osteoarthritis of right hip    2  Lumbar radiculopathy    3  DDD (degenerative disc disease), lumbar    4  Spondylolisthesis at L4-L5 level    5  Lumbar spondylosis    6  Spinal stenosis of lumbar region with neurogenic claudication    7  Right hip pain        Plan:  Patient complains of severe pain that radiates into the right hip and groin  X-ray of the right hip does reveal severe osteoarthritis  1  I will schedule the patient for a right intra-articular hip injection to address the inflammatory component of the patient's pain  2  Patient may continue diclofenac topical gel as prescribed as well as Tylenol p r n  1000 mg q 8 hours  3  I will avoid opioid therapy considering patient's significant psychiatric history  4  I will follow up with the patient after the procedure or sooner if needed  The patient was advised to contact the office should their symptoms worsen in the interim  The patient was agreeable and verbalized an understanding  [place] Prescription Drug Monitoring Program report was reviewed and was appropriate     Other than as stated above, the patient denies any interval changes in medications, medical condition, mental condition, symptoms, or allergies since the last office visit  M*Modal software was used to dictate this note  It may contain errors with dictating incorrect words or incorrect spelling  Please contact the provider directly with any questions  History of Present Illness: The patient is a 62 y o  female last seen on 5/16/19 who presents for a follow up office visit in regards to right-sided hip pain that radiates into the right groin secondary to severe osteoarthritis as evidence on x-ray of the right hip  The patient denies any radiating symptoms into the lower extremity, bowel or bladder incontinence, or saddle anesthesia    She has had bilateral L3-5 radiofrequency ablation in the past which provided significant relief of her back pain, however this pain is different in nature  The patient currently rates her pain a 10/10 on the numeric pain rating scale  She states her pain is constant nature and bothersome throughout the entirety of the day  She characterizes the pain as sharp and shooting  Current pain medications includes:  Tylenol p r n  And diclofenac topical gel p r n      I have personally reviewed and/or updated the patient's past medical history, past surgical history, family history, social history, current medications, allergies, and vital signs today  Review of Systems:    Review of Systems   Respiratory: Negative for shortness of breath  Cardiovascular: Negative for chest pain  Gastrointestinal: Negative for constipation, diarrhea, nausea and vomiting  Musculoskeletal: Negative for arthralgias, gait problem, joint swelling and myalgias  Skin: Negative for rash  Neurological: Negative for dizziness, seizures and weakness  All other systems reviewed and are negative          Past Medical History:   Diagnosis Date    Anxiety     Back pain at L4-L5 level     Schreiber esophagus     Bulimia     Disease of thyroid gland     hypothyroid    GERD (gastroesophageal reflux disease)     Hyperlipidemia     Hypothyroidism     Leukopenia     Sciatica     Seizure (HCC)     Synovial cyst of lumbar spine     Vertigo     Weight gain        Past Surgical History:   Procedure Laterality Date    BONE MARROW BIOPSY      BREAST SURGERY      enlargement     RHINOPLASTY         Family History   Problem Relation Age of Onset    Uterine cancer Mother     Esophageal cancer Father     Colon cancer Maternal Grandmother        Social History     Occupational History    Not on file   Tobacco Use    Smoking status: Never Smoker    Smokeless tobacco: Never Used   Substance and Sexual Activity    Alcohol use: Never     Frequency: Never    Drug use: No    Sexual activity: Not on file         Current Outpatient Medications:     calcium carbonate-vitamin D (OSCAL-D) 500 mg-200 units per tablet, TAKE 1 TABLET BY MOUTH 2 (TWO) TIMES A DAY WITH MEALS FOR 30 DAYS, Disp: 60 tablet, Rfl: 0    diclofenac sodium (VOLTAREN) 1 %, Apply 2 g topically 4 (four) times a day, Disp: 100 g, Rfl: 0    ferrous sulfate 325 (65 Fe) mg tablet, TAKE 1 TABLET (325 MG TOTAL) BY MOUTH 2 (TWO) TIMES A DAY FOR 90 DAYS, Disp: 180 tablet, Rfl: 0    levothyroxine 25 mcg tablet, Take 25 mcg by mouth daily  , Disp: , Rfl:     LORazepam (ATIVAN) 2 mg tablet, Take 1 tablet (2 mg total) by mouth every 8 (eight) hours as needed for anxiety, Disp: 90 tablet, Rfl: 1    omeprazole (PriLOSEC) 40 MG capsule, Take 80 mg by mouth 2 (two) times a day  , Disp: , Rfl:     PARoxetine (PAXIL) 30 mg tablet, Take 1 tablet (30 mg total) by mouth every morning, Disp: 90 tablet, Rfl: 1    QUEtiapine (SEROquel) 400 MG tablet, TAKE 1 TABLET (400 MG TOTAL) BY MOUTH DAILY AT BEDTIME, Disp: 30 tablet, Rfl: 5    ziprasidone (GEODON) 80 mg capsule, Take 1 capsule (80 mg total) by mouth 2 (two) times a day, Disp: 180 capsule, Rfl: 2    Allergies   Allergen Reactions    Latex     Risperdal [Risperidone]     Zyprexa [Olanzapine]        Physical Exam:    /86   Pulse 87   Ht 5' 3" (1 6 m)   Wt 72 6 kg (160 lb)   BMI 28 34 kg/m²     Constitutional:normal, well developed, well nourished, alert, in no distress and non-toxic and no overt pain behavior    Eyes:anicteric  HEENT:grossly intact  Neck:supple, symmetric, trachea midline and no masses   Pulmonary:even and unlabored  Cardiovascular:No edema or pitting edema present  Skin:Normal without rashes or lesions and well hydrated  Psychiatric:Mood and affect appropriate  Neurologic:Cranial Nerves II-XII grossly intact  Musculoskeletal:in wheelchair      Imaging  FL spine and pain procedure    (Results Pending)     RIGHT HIP     INDICATION:   M25 551: Pain in right hip      COMPARISON:  None     VIEWS:  XR HIP/PELV 2-3 VWS RIGHT W PELVIS IF PERFORMED         FINDINGS:     Severe right hip degenerative changes with advanced joint space loss, acetabular articular surface sclerosis and acetabular/femoral head geode formation      No significant hip degenerative changes      No lytic or blastic osseous lesions      Soft tissues are unremarkable      The visualized lumbar spine is unremarkable      IMPRESSION:     Severe right hip degenerative changes            Workstation performed: [id number]    Orders Placed This Encounter   Procedures    [place] spine and pain procedure

## 2019-08-05 ENCOUNTER — TELEPHONE (OUTPATIENT)
Dept: INTERNAL MEDICINE CLINIC | Facility: CLINIC | Age: 59
End: 2019-08-05

## 2019-08-05 DIAGNOSIS — M25.551 RIGHT HIP PAIN: Primary | ICD-10-CM

## 2019-08-05 RX ORDER — TRAMADOL HYDROCHLORIDE 50 MG/1
50 TABLET ORAL EVERY 6 HOURS PRN
Qty: 30 TABLET | Refills: 0 | Status: SHIPPED | OUTPATIENT
Start: 2019-08-05 | End: 2019-11-01

## 2019-08-05 NOTE — TELEPHONE ENCOUNTER
The patient had and xray on her hip and it showed that she has arthritis  She is currently in pain  The pain is so bad that she has to use a walker or a wheel chair to get around  The patient gets a cortisone shot in 14th of this month  She is asking if you could prescribe her a pain killer until then      This call was triaged to MALIA

## 2019-08-09 DIAGNOSIS — M25.551 RIGHT HIP PAIN: Primary | ICD-10-CM

## 2019-08-09 NOTE — TELEPHONE ENCOUNTER
Patient called in to say she has had hip pain for 2 weeks it is not improving she feel sick, achy and nauseated  But most of her pain in in her hip   Please advise

## 2019-08-09 NOTE — TELEPHONE ENCOUNTER
Continue with current pain regimen, and CT of the hip order to look for any occult fracture or other pathology

## 2019-08-10 ENCOUNTER — TELEPHONE (OUTPATIENT)
Dept: OTHER | Facility: OTHER | Age: 59
End: 2019-08-10

## 2019-08-10 NOTE — TELEPHONE ENCOUNTER
Ilana Lal 1960  CONFIDENTIALTY NOTICE: This fax transmission is intended only for the addressee  It contains information that is legally privileged,  confidential or otherwise protected from use or disclosure  If you are not the intended recipient, you are strictly prohibited from reviewing,  disclosing, copying using or disseminating any of this information or taking any action in reliance on or regarding this information  If you have  received this fax in error, please notify us immediately by telephone so that we can arrange for its return to us  Page:  2  Call Id: [id number]  Health Call  Standard Call Report  Health Call  Patient Name: Ilana Lal  Gender: Female  : 1960  Age: 62 Y 6 M 8 D  Return Phone  Number: [phone] (Home)  Address: [place] [address]   City/State/Zip: [address] [place] [address]  Practice Name: [address] [place] [address] Charged:  Physician:  [address]  Name:  Relationship To  Patient: Self  Return Phone Number: [phone] (Home)  Presenting Problem: " I am having severe pain in my right  hip "  Service Type: Triage  Charged Service 1: N/A  Pharmacy Name and  Number:  Nurse Assessment  Nurse: Lorne oJnes Date/Time: 8/10/2019 10:41:55 AM  Type of assessment required:  ---General (Adult or Child)  Duration of Current S/S  ---Started 3 weeks ago but is getting progressively worse  Location/Radiation  ---Right hip  Temperature (F) and route:  ---Denies fever  Symptom Specific Meds (Dose/Time):  ---Tramadol 50 mg 2 days ago  Makes her dizzy  Other S/S  ---Patient has had on-going pain which has been getting progressively worse  Patient  now in walker and unable to weight bear  point shoots down from hip into the leg  Pain Scale on scale of 1-10, 10 being the worst:  ---"15 out of 10   It's terrible "  Symptom progression:  ---worse  Intake and Output  ---No appetite  Ilana Lal 1960  CONFIDENTIALTY NOTICE: This fax transmission is intended only for the addressee  It contains information that is legally privileged,  confidential or otherwise protected from use or disclosure  If you are not the intended recipient, you are strictly prohibited from reviewing,  disclosing, copying using or disseminating any of this information or taking any action in reliance on or regarding this information  If you have  received this fax in error, please notify us immediately by telephone so that we can arrange for its return to us  Page: 2 of 2  Call Id: [id number]  Nurse Assessment  LMP/ Pregnancy:  ---Denies  Breastfeeding  ---No  Last Exam/Treatment:  ---07/30/19 for hip pain  Protocols  Protocol Title Nurse Date/Time  Hip Pain Michelle Garcia 8/10/2019 10:43:52 AM  Question Caller Affirmed  Disp  Time Disposition Final User  8/10/2019 12:00:07 PM Go to ED Now Brien Klein RN, [facility]  8/10/2019 12:02:26 PM RN Triaged Yes Brien Klein RN, [facility] Advice Given Per Protocol  GO TO ED NOW: You need to be seen in the Emergency Department  Go to the ER at ___________ [facility] now  Drive  carefully  NOTE TO TRIAGER - DRIVING: * Another adult should drive  * If there are any problems with automobile transport (e g ,  unable to get to the car), then ambulance transport may be necessary  * The patient, caregiver, or family members can arrange ambulance  transport via private ambulance company or via   CARE ADVICE given per Hip Pain (Adult) guideline  Caller Understands: Yes  Caller Disagree/Comply: Unsure  PreDisposition: Unsure  Comments  User: Anna Pindea RN Date/Time: 8/10/2019 12:00:57 PM  Patient stated that she is going to call 911 since she does not have a ride  User: Anna Pineda RN Date/Time: 8/10/2019 12:02:52 PM  Patient stated that she would go to [facility]

## 2019-08-11 ENCOUNTER — TELEPHONE (OUTPATIENT)
Dept: OTHER | Facility: OTHER | Age: 59
End: 2019-08-11

## 2019-08-11 NOTE — TELEPHONE ENCOUNTER
Mother called stating she wants 10 Percocets to be called into pharmacy  Patient was triaged yesterday for same pain and per prototcol, narcotics are not prescribed on weekends and patient's mother was instructed to have patient seen in the ED  Today mother states that the patient refused to go to the ED yesterday and that is was too expensive to go to the ED  Mom was advised that narcotics will not be called in today  I offered to have a nurse call back for a triage for care advice and mother said, "and just what is she going to be able to do? You could be dying and nobody will help you by giving you pain meds!"  Upon hanging up, Mom again was advised to have patient seen in the ED and she said she would try to get daughter to go

## 2019-08-13 NOTE — TELEPHONE ENCOUNTER
Done, let pt know    Things from chart printed and put in the fax area see where it needs to get faxed

## 2019-08-13 NOTE — TELEPHONE ENCOUNTER
Asif Villalba from Dr [facility] office called said patient has and appt today at 12:00 and they needed medical records on this patient - office notes, testing or anything that will pertain to her visit today with Dr Jocelyn Acosta?     Please advise

## 2019-08-14 ENCOUNTER — HOSPITAL ENCOUNTER (OUTPATIENT)
Dept: RADIOLOGY | Facility: CLINIC | Age: 59
Discharge: HOME | End: 2019-08-14
Attending: ANESTHESIOLOGY
Payer: COMMERCIAL

## 2019-08-14 VITALS
OXYGEN SATURATION: 93 % | DIASTOLIC BLOOD PRESSURE: 89 MMHG | RESPIRATION RATE: 18 BRPM | TEMPERATURE: 98.6 F | HEART RATE: 86 BPM | SYSTOLIC BLOOD PRESSURE: 136 MMHG

## 2019-08-14 DIAGNOSIS — M16.11 PRIMARY OSTEOARTHRITIS OF RIGHT HIP: ICD-10-CM

## 2019-08-14 DIAGNOSIS — M25.551 RIGHT HIP PAIN: ICD-10-CM

## 2019-08-14 PROCEDURE — 20610 DRAIN/INJ JOINT/BURSA W/O US: CPT | Performed by: ANESTHESIOLOGY

## 2019-08-14 PROCEDURE — 77002 NEEDLE LOCALIZATION BY XRAY: CPT

## 2019-08-14 PROCEDURE — 77002 NEEDLE LOCALIZATION BY XRAY: CPT | Performed by: ANESTHESIOLOGY

## 2019-08-14 RX ORDER — METHYLPREDNISOLONE ACETATE 40 MG/ML
40 INJECTION, SUSPENSION INTRA-ARTICULAR; INTRALESIONAL; INTRAMUSCULAR; PARENTERAL; SOFT TISSUE ONCE
Status: COMPLETED | OUTPATIENT
Start: 2019-08-14 | End: 2019-08-14

## 2019-08-14 RX ORDER — LIDOCAINE HYDROCHLORIDE 10 MG/ML
5 INJECTION, SOLUTION EPIDURAL; INFILTRATION; INTRACAUDAL; PERINEURAL ONCE
Status: COMPLETED | OUTPATIENT
Start: 2019-08-14 | End: 2019-08-14

## 2019-08-14 RX ORDER — BUPIVACAINE HCL/PF 2.5 MG/ML
30 VIAL (ML) INJECTION ONCE
Status: COMPLETED | OUTPATIENT
Start: 2019-08-14 | End: 2019-08-14

## 2019-08-14 RX ADMIN — METHYLPREDNISOLONE ACETATE 40 MG: 40 INJECTION, SUSPENSION INTRA-ARTICULAR; INTRALESIONAL; INTRAMUSCULAR; SOFT TISSUE at 09:29

## 2019-08-14 RX ADMIN — LIDOCAINE HYDROCHLORIDE 2 ML: 10 INJECTION, SOLUTION EPIDURAL; INFILTRATION; INTRACAUDAL; PERINEURAL at 09:26

## 2019-08-14 RX ADMIN — IOHEXOL 1 ML: 300 INJECTION, SOLUTION INTRAVENOUS at 09:28

## 2019-08-14 RX ADMIN — BUPIVACAINE HYDROCHLORIDE 3 ML: 2.5 INJECTION, SOLUTION EPIDURAL; INFILTRATION; INTRACAUDAL at 09:29

## 2019-08-14 NOTE — DISCHARGE INSTRUCTIONS
Steroid Joint Injection   WHAT YOU NEED TO KNOW:   A steroid joint injection is a procedure to inject steroid medicine into a joint  Steroid medicine decreases pain and inflammation  The injection may also contain an anesthetic (numbing medicine) to decrease pain  It may be done to treat conditions such as arthritis, gout, or carpal tunnel syndrome  The injections may be given in your knee, ankle, shoulder, elbow, wrist, ankle or sacroiliac joint  1  Do not apply heat to any area that is numb  If you have discomfort or soreness at the injection site, you may apply ice today, 20 minutes on and 20 minutes off  Tomorrow you may use ice or warm, moist heat  Do not apply ice or heat directly to the skin  2  You may have an increase or change in the discomfort for 36-48 hours after your treatment  Apply ice and continue with any pain medicine you have been prescribed  3  Do not do anything strenuous today  You may shower, but no tub baths or hot tubs today  You may resume your normal activities tomorrow, but do not overdo it  Resume normal activities slowly when you are feeling better  4  If you experience redness, drainage or swelling at the injection site, or if you develop a fever above 100 degrees, please call [facility] at [phone] or go to the Emergency Room  5  Continue to take all routine medicines prescribed by your primary care physician unless otherwise instructed by our staff  Most blood thinners should be started again according to your regularly scheduled dosing  If you have any questions, please give our office a call  If you have a problem specifically related to your procedure, please call our office at [phone]  Problems not related to your procedure should be directed to your primary care physician

## 2019-08-14 NOTE — H&P
History of Present Illness: The patient is a 62 y o  female who presents with complaints of right hip pain      Patient Active Problem List   Diagnosis    Hyponatremia    Lumbar radiculopathy    DDD (degenerative disc disease), lumbar    Spondylolisthesis at L4-L5 level    Localized osteoporosis without current pathological fracture    URI (upper respiratory infection)    Spinal stenosis of lumbar region with neurogenic claudication    Lumbar spondylosis    T12 compression fracture (HCC)    Synovial cyst of lumbar facet joint    Anxiety    Bulimia nervosa in remission    Constipation    Esophageal reflux    Hyperlipidemia    Acquired hypothyroidism    Other fatigue    Serotonin syndrome    Leukopenia    Schizoaffective disorder, depressive type (HCC)    ABIMAEL (generalized anxiety disorder)    Flank pain    Dermal hypersensitivity reaction    Dysuria    Viral upper respiratory tract infection    Elevated BP without diagnosis of hypertension    Essential hypertension    Right hip pain       Past Medical History:   Diagnosis Date    Anxiety     Back pain at L4-L5 level     Schreiber esophagus     Bulimia     Disease of thyroid gland     hypothyroid    GERD (gastroesophageal reflux disease)     Hyperlipidemia     Hypothyroidism     Leukopenia     Sciatica     Seizure (HCC)     Synovial cyst of lumbar spine     Vertigo     Weight gain        Past Surgical History:   Procedure Laterality Date    BONE MARROW BIOPSY      BREAST SURGERY      enlargement     RHINOPLASTY           Current Outpatient Medications:     calcium carbonate-vitamin D (OSCAL-D) 500 mg-200 units per tablet, TAKE 1 TABLET BY MOUTH 2 (TWO) TIMES A DAY WITH MEALS FOR 30 DAYS, Disp: 60 tablet, Rfl: 0    diclofenac sodium (VOLTAREN) 1 %, Apply 2 g topically 4 (four) times a day, Disp: 100 g, Rfl: 0    ferrous sulfate 325 (65 Fe) mg tablet, TAKE 1 TABLET (325 MG TOTAL) BY MOUTH 2 (TWO) TIMES A DAY FOR 90 DAYS, Disp: 180 tablet, Rfl: 0    levothyroxine 25 mcg tablet, Take 25 mcg by mouth daily  , Disp: , Rfl:     LORazepam (ATIVAN) 2 mg tablet, Take 1 tablet (2 mg total) by mouth every 8 (eight) hours as needed for anxiety, Disp: 90 tablet, Rfl: 1    omeprazole (PriLOSEC) 40 MG capsule, Take 80 mg by mouth 2 (two) times a day  , Disp: , Rfl:     PARoxetine (PAXIL) 30 mg tablet, Take 1 tablet (30 mg total) by mouth every morning, Disp: 90 tablet, Rfl: 1    QUEtiapine (SEROquel) 400 MG tablet, TAKE 1 TABLET (400 MG TOTAL) BY MOUTH DAILY AT BEDTIME, Disp: 30 tablet, Rfl: 5    traMADol (ULTRAM) 50 mg tablet, Take 1 tablet (50 mg total) by mouth every 6 (six) hours as needed for moderate pain, Disp: 30 tablet, Rfl: 0    ziprasidone (GEODON) 80 mg capsule, Take 1 capsule (80 mg total) by mouth 2 (two) times a day, Disp: 180 capsule, Rfl: 2    Allergies   Allergen Reactions    Latex     Risperdal [Risperidone]     Zyprexa [Olanzapine]        Physical Exam:   Vitals:    08/14/19 0905   BP: 135/87   Pulse: 88   Resp: 18   Temp: 98 6 °F (37 °C)   SpO2: 96%     General: Awake, Alert, Oriented x 3, Mood and affect appropriate  Respiratory: Respirations even and unlabored  Cardiovascular: Peripheral pulses intact; no edema  Musculoskeletal Exam:  Painful range of motion of right hip, particularly with internal rotation  ASA Score: 2    Patient/Chart Verification  Patient ID Verified: Verbal  ID Band Applied: No  Consents Confirmed: Procedural  H&P( within 30 days) Verified: To be obtained in the Pre-Procedure area  Interval H&P(within 24 hr) Complete (required for Outpatients and Surgery Admit only): To be obtained in the Pre-Procedure area  Allergies Reviewed: Yes  Anticoag/NSAID held?: NA  Currently on antibiotics?: No  Pre-op Lab/Test Results Available: N/A  Pregnancy Lab Collected: N/A comment    Assessment:   1  Primary osteoarthritis of right hip    2   Right hip pain        Plan: Right intra-articular hip injection

## 2019-08-21 ENCOUNTER — TELEPHONE (OUTPATIENT)
Dept: PAIN MEDICINE | Facility: CLINIC | Age: 59
End: 2019-08-21

## 2019-08-23 ENCOUNTER — HOSPITAL ENCOUNTER (OUTPATIENT)
Dept: RADIOLOGY | Facility: HOSPITAL | Age: 59
Discharge: HOME | End: 2019-08-23
Payer: COMMERCIAL

## 2019-08-23 ENCOUNTER — TRANSCRIBE ORDERS (OUTPATIENT)
Dept: RADIOLOGY | Facility: HOSPITAL | Age: 59
End: 2019-08-23

## 2019-08-23 DIAGNOSIS — M25.551 RIGHT HIP PAIN: ICD-10-CM

## 2019-08-23 PROCEDURE — 73700 CT LOWER EXTREMITY W/O DYE: CPT

## 2019-08-27 DIAGNOSIS — K21.9 GASTROESOPHAGEAL REFLUX DISEASE WITHOUT ESOPHAGITIS: ICD-10-CM

## 2019-08-27 RX ORDER — OMEPRAZOLE 40 MG/1
CAPSULE, DELAYED RELEASE ORAL
Qty: 180 CAPSULE | Refills: 1 | Status: SHIPPED | OUTPATIENT
Start: 2019-08-27 | End: 2019-11-14

## 2019-09-05 ENCOUNTER — TELEPHONE (OUTPATIENT)
Dept: INTERNAL MEDICINE CLINIC | Facility: CLINIC | Age: 59
End: 2019-09-05

## 2019-09-05 DIAGNOSIS — F41.9 ANXIETY: ICD-10-CM

## 2019-09-05 RX ORDER — LORAZEPAM 2 MG/1
2 TABLET ORAL EVERY 8 HOURS PRN
Qty: 90 TABLET | Refills: 1 | Status: SHIPPED | OUTPATIENT
Start: 2019-09-05 | End: 2019-11-13 | Stop reason: SDUPTHER

## 2019-09-05 NOTE — TELEPHONE ENCOUNTER
Pt called in requesting Dr Lesvia Motta to fill her oxycodone stating she called her pain management doctor who prescribes it for her and that's what they her told her to do      I called Dr Cheryle Hipps office [phone] and spoke with them and they advised her to call our office ONLY because pt was complaining of chills, nausea and tremors NOT for the oxycodone refill  Dr Conor Lopez will not be in their office until next week when pt is sched on 9/9 and they will not fill the prescription until they see her and they cannot get her in [facility] for pt to CB to advise she was only to call her to see Dr Lesvia Motta for the above complaints and not for the refill

## 2019-09-06 ENCOUNTER — TELEPHONE (OUTPATIENT)
Dept: INTERNAL MEDICINE CLINIC | Facility: CLINIC | Age: 59
End: 2019-09-06

## 2019-09-06 NOTE — TELEPHONE ENCOUNTER
The patient no longer has the chills, nausea and tremors  She has serious hip pain and wants to know if there is anything you could do to help with the pain  She does have an injection for her pain on Monday  Please advise   Thank you

## 2019-09-06 NOTE — TELEPHONE ENCOUNTER
Informed the patient's mother   The patient's mother understood the instructions and repeated them to me

## 2019-09-06 NOTE — TELEPHONE ENCOUNTER
Looks like patient has Voltaren, the tramadol, and Aspercreme which has lidocaine in it  Confirm if she is using all these as all these are pain  medications with different mechanisms  Again, the other possible is a hip infection, but I would expect her to have fevers with that    If patient were to develop fevers, she should head to the emergency room over the weekend

## 2019-09-06 NOTE — TELEPHONE ENCOUNTER
The patient's mother called requesting that you call in a prescription for percocet  She needs a 3 day supply  Erika Hutchison said Irene's pain is so bad that she is in bed crying   Please advise, thank you

## 2019-09-06 NOTE — TELEPHONE ENCOUNTER
It is possible patient is having some mild withdrawal symptoms from oxycodone if she has other symptoms of infection such as respiratory infection, urinary tract infection, GI bug, etc she should get evaluated

## 2019-09-10 ENCOUNTER — TELEPHONE (OUTPATIENT)
Dept: INTERNAL MEDICINE CLINIC | Facility: CLINIC | Age: 59
End: 2019-09-10

## 2019-09-10 NOTE — TELEPHONE ENCOUNTER
Patient is calling to let you know she had an injection in her hip yesterday to remove fluid  Today she has a fever and nausea  She was advised to call her PCP in regards to this  Patient is unable to come in for an appointment      Please advise

## 2019-09-10 NOTE — TELEPHONE ENCOUNTER
The gram stain and white blood cells were normal in the joint fluid, therefore infection is much less likely  Fluid culture is pending  If patient having higher fevers or increased pain, she should go to the emergency room as the concern has always been possible infection in the hip    For a mild temperature, she can take Tylenol for this which should help

## 2019-10-09 DIAGNOSIS — L23.9 DERMAL HYPERSENSITIVITY REACTION: ICD-10-CM

## 2019-10-10 RX ORDER — TRIAMCINOLONE ACETONIDE 1 MG/G
CREAM TOPICAL
Qty: 80 G | Refills: 2 | OUTPATIENT
Start: 2019-10-10

## 2019-10-11 DIAGNOSIS — L23.9 DERMAL HYPERSENSITIVITY REACTION: ICD-10-CM

## 2019-10-11 RX ORDER — TRIAMCINOLONE ACETONIDE 1 MG/G
CREAM TOPICAL
Qty: 80 G | Refills: 2 | Status: SHIPPED | OUTPATIENT
Start: 2019-10-11 | End: 2020-01-14 | Stop reason: HOSPADM

## 2019-10-21 ENCOUNTER — APPOINTMENT (EMERGENCY)
Dept: RADIOLOGY | Facility: HOSPITAL | Age: 59
End: 2019-10-21
Payer: COMMERCIAL

## 2019-10-21 ENCOUNTER — HOSPITAL ENCOUNTER (EMERGENCY)
Facility: HOSPITAL | Age: 59
Discharge: HOME | End: 2019-10-21
Attending: EMERGENCY MEDICINE | Admitting: EMERGENCY MEDICINE
Payer: COMMERCIAL

## 2019-10-21 ENCOUNTER — TELEPHONE (OUTPATIENT)
Dept: OTHER | Facility: OTHER | Age: 59
End: 2019-10-21

## 2019-10-21 VITALS
DIASTOLIC BLOOD PRESSURE: 81 MMHG | RESPIRATION RATE: 18 BRPM | BODY MASS INDEX: 28.34 KG/M2 | WEIGHT: 160 LBS | OXYGEN SATURATION: 99 % | HEART RATE: 92 BPM | SYSTOLIC BLOOD PRESSURE: 145 MMHG | TEMPERATURE: 98.9 F

## 2019-10-21 DIAGNOSIS — M16.11 OSTEOARTHRITIS OF RIGHT HIP: Primary | ICD-10-CM

## 2019-10-21 PROCEDURE — 99283 EMERGENCY DEPT VISIT LOW MDM: CPT

## 2019-10-21 PROCEDURE — 99284 EMERGENCY DEPT VISIT MOD MDM: CPT | Performed by: EMERGENCY MEDICINE

## 2019-10-21 PROCEDURE — 73502 X-RAY EXAM HIP UNI 2-3 VIEWS: CPT

## 2019-10-21 RX ORDER — CAPSAICIN 0.07 G/100G
CREAM TOPICAL 3 TIMES DAILY
Qty: 28.3 G | Refills: 0 | Status: SHIPPED | OUTPATIENT
Start: 2019-10-21 | End: 2019-11-01

## 2019-10-21 NOTE — TELEPHONE ENCOUNTER
Kaley Cheema 1960  CONFIDENTIALTY NOTICE: This fax transmission is intended only for the addressee  It contains information that is legally privileged,  confidential or otherwise protected from use or disclosure  If you are not the intended recipient, you are strictly prohibited from reviewing,  disclosing, copying using or disseminating any of this information or taking any action in reliance on or regarding this information  If you have  received this fax in error, please notify us immediately by telephone so that we can arrange for its return to us  Page:  2  Call Id: [id number]  Health Call  Standard Call Report  Health Call  Patient Name: Kaley Cheema  Gender: Female  : 1960  Age: 61 Y 1 M 21 D  Return Phone  Number: [phone] (Home)  Address: [place] [address]   City/State/Zip: [address] [place] [address]  Practice Name: [facility]  [address] Charged:  Physician:  [address] [place] [address] Name:  Relationship To  Patient: Self  Return Phone Number: [phone] (Home)  Presenting Problem: "I am having extreme hip pain this is  emergency "  Service Type: Triage  Charged Service 1: Amina Lee U  38  Name and  Number:  Nurse Assessment  Nurse: Darlyn Chao RN, Sameera Vazquez Date/Time: 10/21/2019 7:11:50 AM  Type of assessment required:  ---General (Adult or Child)  Duration of Current S/S  ---This morning  Location/Radiation  ---Hip  Temperature (F) and route:  ---N/A  Symptom Specific Meds (Dose/Time):  ---Percocet this morning  Other S/S  ---Hip pain can hardly walk  Pain Scale on scale of 1-10, 10 being the worst:  ---10/10  Symptom progression:  ---worse  Intake and Output  ---Brandamore Sprung [place] [facility]  LMP/ Pregnancy:  Kaley Cheema 1960  CONFIDENTIALTY NOTICE: This fax transmission is intended only for the addressee  It contains information that is legally privileged,  confidential or otherwise protected from use or disclosure   If you are not the intended recipient, you are strictly prohibited from reviewing,  disclosing, copying using or disseminating any of this information or taking any action in reliance on or regarding this information  If you have  received this fax in error, please notify us immediately by telephone so that we can arrange for its return to us  Page: 2 of 2  Call Id: [id number]  Nurse Assessment  ---N/A  Breastfeeding  ---No  Last Exam/Treatment:  ---July for hip pain  Protocols  Protocol Title Nurse Date/Time  Hip Pain FRANCIA Escobedo Lar[place]er 10/21/2019 7:15:44 AM  Question Caller Affirmed  Disp  Time Disposition Final User  10/21/2019 7:17:37 AM Go to ED Now Gissel Dill RN, [place] Deer  10/21/2019 7:17:58 AM RN Triaged Yes FRANCIA Escobedo, [facility] Advice Given Per Protocol  GO TO ED NOW: You need to be seen in the Emergency Department  Go to the ER at [facility]  Leave now  Drive  carefully  NOTE TO TRIAGER - DRIVING: * Another adult should drive  * If there are any problems with automobile transport (e g ,  unable to get to the car), then ambulance transport may be necessary  * The patient, caregiver, or family members can arrange ambulance  transport via private ambulance company or via   CARE ADVICE given per Hip Pain (Adult) guideline    Caller Understands: Yes  Caller Disagree/Comply: Comply  PreDisposition: Unsure

## 2019-10-21 NOTE — DISCHARGE INSTRUCTIONS
Osteoarthritis   WHAT YOU NEED TO KNOW:   Osteoarthritis (OA) occurs when cartilage (tissue that cushions a joint) wears away slowly and causes the bones to rub together  OA is a long-term condition that often affects the hands, neck, lower back, knees, and hips  OA is also called arthrosis or degenerative joint disease  DISCHARGE INSTRUCTIONS:   Return to the emergency department if:   · You have severe pain  · You cannot move your joint  Contact your healthcare provider if:   · You have a fever  · Your joint is red and tender  · You have questions or concerns about your condition or care  Medicines:   · Acetaminophen  is used to decrease pain  It is available without a doctor's order  Ask how much to take and how often to take it  Follow directions  Acetaminophen can cause liver damage if not taken correctly  · NSAIDs , such as ibuprofen, help decrease swelling, pain, and fever  This medicine is available with or without a doctor's order  NSAIDs can cause stomach bleeding or kidney problems in certain people  If you take blood thinner medicine, always ask your healthcare provider if NSAIDs are safe for you  Always read the medicine label and follow directions  · Prescription pain medicine  may be given to decrease severe pain if other medicines do not work  Take the medicine as directed  Do not wait until the pain is severe before you take your medicine  · Take your medicine as directed  Contact your healthcare provider if you think your medicine is not helping or if you have side effects  Tell him or her if you are allergic to any medicine  Keep a list of the medicines, vitamins, and herbs you take  Include the amounts, and when and why you take them  Bring the list or the pill bottles to follow-up visits  Carry your medicine list with you in case of an emergency    Follow up with your healthcare provider as directed:  Write down your questions so you remember to ask them during your visits  Go to physical therapy as directed:  A physical therapist teaches you exercises to help improve movement and strength, and to decrease pain in your joints  The exercises also help lower your risk for loss of function  Manage your OA:   · Stay active  Physical activity may reduce your pain and improve your ability to do daily activities  Avoid activities that cause pain  Ask your healthcare provider what type of exercise would be best for you  · Maintain a healthy weight  This helps decrease the strain on the joints in your back, hips, knees, ankles, and feet  Ask your healthcare provider how much you should weigh  Ask him to help you create a weight loss plan if you are overweight  · Use heat or ice  on your joints as directed  Heat and ice help decrease pain, swelling, and muscle spasms  Use a heating pad on a low setting or take a warm bath  Use an ice pack, or put crushed ice in a plastic bag  Cover it with a towel  · Massage  the muscles around the joint to relieve pain and stiffness  · Use a cane, crutches, or a walker  to protect and relieve pressure on your ankle, knee, and hip joints  You may also be prescribed shoe inserts to decrease pressure in your joints  · Wear flat or low-heeled shoes  This will help decrease pain and reduce pressure on your ankle, knee, and hip joints  © 2017 [employer] Information is for End User's use only and may not be sold, redistributed or otherwise used for commercial purposes  All illustrations and images included in CareNotes® are the copyrighted property of A D A M , Inc  or Stef Orellana  The above information is an  only  It is not intended as medical advice for individual conditions or treatments  Talk to your doctor, nurse or pharmacist before following any medical regimen to see if it is safe and effective for you

## 2019-10-21 NOTE — ED NOTES
PT awake and alert, no distress noted  No other questions upon d/c       April Maci Dia RN  10/21/19 1640

## 2019-10-21 NOTE — ED PROVIDER NOTES
History  Chief Complaint   Patient presents with    Hip Pain     c/o right hip pain x 4 months  Pt stated "I have bad arthritis in my hip and i'm supposed to have a hip replacement"  Pt became tearful and hyperventalating when wheeled into triage  Last Percocet at 1000 today     Helena Alejandra is a 61 y o  female who presents to the ED with complaints of chronic worsening right hip pain  Patient state she is being seen by pain management and is currently receiving injections into the right hip  Patient states she missed her appointment for the injection today because the "pain was so bad " Patient states she has been having "clicking" of the right hip over the past 2 weeks  Patient states she has been told she is supposed to be having a hip replacement and was sent to the ER to get a referral to an orthopedic doctor  Denies injury, fall, erythema, edema, numbness, tingling, weakness, previous surgery, abdominal pain, nausea, vomiting, diarrhea, chest pain, SOB, fever, chills  History provided by:  Patient  Leg Pain   Location:  Hip  Hip location:  R hip  Associated symptoms: no back pain, no decreased ROM, no fatigue, no fever, no itching, no muscle weakness, no neck pain, no numbness, no stiffness, no swelling and no tingling        Prior to Admission Medications   Prescriptions Last Dose Informant Patient Reported? Taking?    LORazepam (ATIVAN) 2 mg tablet   No No   Sig: TAKE 1 TABLET (2 MG TOTAL) BY MOUTH EVERY 8 (EIGHT) HOURS AS NEEDED FOR ANXIETY   PARoxetine (PAXIL) 30 mg tablet   No No   Sig: Take 1 tablet (30 mg total) by mouth every morning   QUEtiapine (SEROquel) 400 MG tablet   No No   Sig: TAKE 1 TABLET (400 MG TOTAL) BY MOUTH DAILY AT BEDTIME   calcium carbonate-vitamin D (OSCAL-D) 500 mg-200 units per tablet  Self No No   Sig: TAKE 1 TABLET BY MOUTH 2 (TWO) TIMES A DAY WITH MEALS FOR 30 DAYS   diclofenac sodium (VOLTAREN) 1 %   No No   Sig: Apply 2 g topically 4 (four) times a day ferrous sulfate 325 (65 Fe) mg tablet  Self No No   Sig: TAKE 1 TABLET (325 MG TOTAL) BY MOUTH 2 (TWO) TIMES A DAY FOR 90 DAYS   levothyroxine 25 mcg tablet  Self Yes No   Sig: Take 25 mcg by mouth daily  omeprazole (PriLOSEC) 40 MG capsule  Self Yes No   Sig: Take 80 mg by mouth 2 (two) times a day     omeprazole (PriLOSEC) 40 MG capsule   No No   Sig: TAKE ONE CAPSULE BY MOUTH TWO TIMES A DAY   traMADol (ULTRAM) 50 mg tablet   No No   Sig: Take 1 tablet (50 mg total) by mouth every 6 (six) hours as needed for moderate pain   triamcinolone (KENALOG) 0 1 % cream   No No   Sig: APPLY TO AFFECTED AREA TWICE A DAY   ziprasidone (GEODON) 80 mg capsule   No No   Sig: Take 1 capsule (80 mg total) by mouth 2 (two) times a day      Facility-Administered Medications: None       Past Medical History:   Diagnosis Date    Anxiety     Back pain at L4-L5 level     Schreiber esophagus     Bulimia     Disease of thyroid gland     hypothyroid    GERD (gastroesophageal reflux disease)     Hyperlipidemia     Hypothyroidism     Leukopenia     Sciatica     Seizure (HCC)     Synovial cyst of lumbar spine     Vertigo     Weight gain        Past Surgical History:   Procedure Laterality Date    BONE MARROW BIOPSY      BREAST SURGERY      enlargement     RHINOPLASTY         Family History   Problem Relation Age of Onset    Uterine cancer Mother     Esophageal cancer Father     Colon cancer Maternal Grandmother      I have reviewed and agree with the history as documented  Social History     Tobacco Use    Smoking status: Never Smoker    Smokeless tobacco: Never Used   Substance Use Topics    Alcohol use: Never     Frequency: Never    Drug use: No        Review of Systems   Constitutional: Negative for appetite change, chills, fatigue, fever and unexpected weight change  HENT: Negative for congestion, drooling, ear pain, rhinorrhea, sore throat, trouble swallowing and voice change      Eyes: Negative for pain, discharge, redness and visual disturbance  Respiratory: Negative for cough, shortness of breath, wheezing and stridor  Cardiovascular: Negative for chest pain, palpitations and leg swelling  Gastrointestinal: Negative for abdominal pain, blood in stool, constipation, diarrhea, nausea and vomiting  Genitourinary: Negative for dysuria, flank pain, frequency, hematuria and urgency  Musculoskeletal: Positive for arthralgias  Negative for back pain, gait problem, joint swelling, neck pain, neck stiffness and stiffness  Skin: Negative for color change, itching and rash  Neurological: Negative for dizziness, seizures, light-headedness and headaches  Physical Exam  Physical Exam   Constitutional: She is oriented to person, place, and time  She appears well-developed and well-nourished  HENT:   Head: Normocephalic and atraumatic  Nose: Nose normal    Mouth/Throat: Oropharynx is clear and moist    Eyes: Pupils are equal, round, and reactive to light  Conjunctivae and EOM are normal    Cardiovascular: Normal rate, regular rhythm and intact distal pulses  Pulmonary/Chest: Effort normal and breath sounds normal    Musculoskeletal: Normal range of motion  Right hip: She exhibits tenderness  She exhibits normal range of motion and normal strength  TTP of the lateral right hip  TTP of the IT band  No erythema or edema  No ecchymosis  No audible clicking  No bony deformities, inflammation, or tenderness in hip joint  Normal ROM upon flexion and extension, internal and external rotation, abduction, and adduction  Full strength in hip flexors/extensors, adductors/abductors  No evidence of leg shortening or abnormal posturing  Neurovascularly intact  Neurological: She is alert and oriented to person, place, and time  Skin: Skin is warm and dry  Capillary refill takes less than 2 seconds  Psychiatric: She has a normal mood and affect  Nursing note and vitals reviewed        Vital Signs  ED Triage Vitals [10/21/19 1420]   Temperature Pulse Respirations Blood Pressure SpO2   98 9 °F (37 2 °C) 92 18 145/81 99 %      Temp Source Heart Rate Source Patient Position - Orthostatic VS BP Location FiO2 (%)   Oral Monitor Sitting Left arm --      Pain Score       Worst Possible Pain           Vitals:    10/21/19 1420   BP: 145/81   Pulse: 92   Patient Position - Orthostatic VS: Sitting         Visual Acuity      ED Medications  Medications - No data to display    Diagnostic Studies  Results Reviewed     None                 XR hip/pelv 2-3 vws right   Final Result by zA De Jesus MD (10/21 1616)      Severe right hip osteoarthritis similar to the recent CT examination  Workstation performed: [id number]                    Procedures  Procedures       ED Course  ED Course as of Oct 21 1627   Mon Oct 21, 2019   1618 Educated patient regarding diagnosis and management  Advised patient to follow up with PCP  Advised patient to RTER for persistent or worsening symptoms  MDM  Number of Diagnoses or Management Options  Osteoarthritis of right hip: new and does not require workup  Diagnosis management comments: Bob Dakin is a 61 y o  female who presents to the ED with complaints of chronic worsening right hip pain  Patient state she is being seen by pain management and is currently receiving injections into the right hip  Patient states she missed her appointment for the injection today because the "pain was so bad " Patient states she has been having "clicking" of the right hip over the past 2 weeks  Patient states she has been told she is supposed to be having a hip replacement and was sent to the ER to get a referral to an orthopedic doctor  XR Right Hip without acute findings  Patient will be advised to follow up with outpatient orthopedics and/or pain specialist  I provided patient with strict RTER precautions   I advised patient follow-up with PCP in 24-48 hours  Patient verbalized understanding  Amount and/or Complexity of Data Reviewed  Tests in the radiology section of CPT®: ordered and reviewed  Review and summarize past medical records: yes    Risk of Complications, Morbidity, and/or Mortality  Presenting problems: moderate  Diagnostic procedures: moderate  Management options: moderate    Patient Progress  Patient progress: improved      Disposition  Final diagnoses:   Osteoarthritis of right hip     Time reflects when diagnosis was documented in both MDM as applicable and the Disposition within this note     Time User Action Codes Description Comment    10/21/2019  4:18 PM Hussein Houser Add [M16 11] Osteoarthritis of right hip       ED Disposition     ED Disposition Condition Date/Time Comment    Discharge Stable Mon Oct 21, 2019  4:18 PM Cooper Lyudmila discharge to home/self care              Follow-up Information     Follow up With Specialties Details Why Contact Info Additional [address] Emergency Department Emergency Medicine Go to  If symptoms worsen [address] [facility] [address],  [address], [place], [place], [address]    Genia King MD Internal Medicine Schedule an appointment as soon as possible for a visit   [address]        [address] [facility] Orthopedic Surgery Schedule an appointment as soon as possible for a visit   [address] [facility] [id number]  [facility] [address], [place] [address], [facility] [address] [place], [place], [address] [facility]          Patient's Medications   Discharge Prescriptions    CAPSICUM (ZOSTRIX) 0 075 % TOPICAL CREAM    Apply topically 3 (three) times a day       Start Date: 10/21/2019End Date: --       Order Dose: --       Quantity: 28 3 g    Refills: 0     No discharge procedures on file     ED Provider  Electronically Signed by           Marcel Burroughs PA-C  10/21/19 6873    I was available for consultation    I was not consulted regarding this patient and did not personally evaluate the patient       Az Zuñiga MD  10/22/19 5776

## 2019-10-24 ENCOUNTER — TELEPHONE (OUTPATIENT)
Dept: PAIN MEDICINE | Facility: CLINIC | Age: 59
End: 2019-10-24

## 2019-10-24 DIAGNOSIS — Z76.0 ENCOUNTER FOR MEDICATION REFILL: ICD-10-CM

## 2019-10-24 RX ORDER — PAROXETINE 30 MG/1
60 TABLET, FILM COATED ORAL EVERY MORNING
Qty: 180 TABLET | Refills: 3 | Status: SHIPPED | OUTPATIENT
Start: 2019-10-24 | End: 2020-01-14 | Stop reason: HOSPADM

## 2019-10-24 NOTE — TELEPHONE ENCOUNTER
The patient is asking if the dose can be increased to 2 tablets daily  I explained to her the PCP in the office would need to make that determination  Would someone from the office please call her to let her know if the requested change will be called in or not  Please have the prescription sent to the noted [facility] in her chart

## 2019-10-24 NOTE — TELEPHONE ENCOUNTER
S/w the patient today and she stated the pain in her LB continues as well as her Right hip pain  She stated she had an RFA previously done in 8/19  I don't see any documentation of that except for the Right hip injection on 8/14/19  She stated that did not help her any way and she wants to know id she can have another injection before she has the hip surgery  She has not seen ortho yet in regards to all of this  Would you like to see her for an ovs for reevaluation? ?

## 2019-10-24 NOTE — TELEPHONE ENCOUNTER
Patient   [phone]  Shayne Nurse     Patient requesting calling back  She has some questions about a hip replacement and injections   Please follow up thank you

## 2019-10-29 ENCOUNTER — OFFICE VISIT (OUTPATIENT)
Dept: OBGYN CLINIC | Facility: CLINIC | Age: 59
End: 2019-10-29
Payer: COMMERCIAL

## 2019-10-29 VITALS
BODY MASS INDEX: 28.34 KG/M2 | DIASTOLIC BLOOD PRESSURE: 79 MMHG | HEIGHT: 63 IN | HEART RATE: 89 BPM | SYSTOLIC BLOOD PRESSURE: 120 MMHG

## 2019-10-29 DIAGNOSIS — M16.11 PRIMARY OSTEOARTHRITIS OF RIGHT HIP: Primary | ICD-10-CM

## 2019-10-29 PROCEDURE — 99214 OFFICE O/P EST MOD 30 MIN: CPT | Performed by: ORTHOPAEDIC SURGERY

## 2019-10-29 RX ORDER — OXYCODONE HYDROCHLORIDE AND ACETAMINOPHEN 5; 325 MG/1; MG/1
1 TABLET ORAL EVERY 4 HOURS PRN
COMMUNITY
End: 2019-12-20 | Stop reason: HOSPADM

## 2019-10-29 NOTE — PROGRESS NOTES
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 and requires 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 recovery  She read about the anterior approach and wants to pursue this if possible  I recommended consultation with Dr Kelsey Bradshaw as he is very experienced with this approach  She will contact me as needed for further assistance  Chief Complaint     Right hip pain      History of the Present Illness     Helena Alejandra is a 61 y o  female who reports to the office today for evaluation of her right hip  She notes worsening right hip pain over the past five months, becoming severe at this point  She denies any injury or trauma  She notes generalized pain about the right hip which she describes as deep  The pain is worse with all activities including standing and walking  She notes limited ability to ambulate, as well as generalized stiffness and weakness  She denies instability  She has been treating with Dr Shi Escudero and has undergone multiple multiple steroid injections in the hip without significant relief  She has tried multiple medications as well  Physical Exam     /79   Pulse 89   Ht 5' 3" (1 6 m)   BMI 28 34 kg/m²     Right hip:  No gross deformity  No tenderness to palpation  Range of motion is intact and limited in all planes with pain    Negative logroll test   Positive TAWANNA test   Pain and weakness noted with resisted hip flexion and abduction  Sensation intact distally  Skin warm and well perfused  Eyes: Anicteric sclerae  Neck: Supple  Lungs: Unlabored breathing  Cardiovascular: No lower extremity edema  Skin: Intact without erythema  Neurologic: Sensation intact to light touch  Psychiatric: Mood and affect are appropriate  Data Review     I have personally reviewed pertinent films in PACS, and my interpretation follows:    X-rays right hip 10/21/19 reveals severe degenerative changes about the right hip joint  Past Medical History:   Diagnosis Date    Anxiety     Back pain at L4-L5 level     Schreiber esophagus     Bulimia     Disease of thyroid gland     hypothyroid    GERD (gastroesophageal reflux disease)     Hyperlipidemia     Hypothyroidism     Leukopenia     Sciatica     Seizure (HCC)     Synovial cyst of lumbar spine     Vertigo     Weight gain        Past Surgical History:   Procedure Laterality Date    BONE MARROW BIOPSY      BREAST SURGERY      enlargement     RHINOPLASTY         Allergies   Allergen Reactions    Latex     Risperdal [Risperidone]     Zyprexa [Olanzapine]        Current Outpatient Medications on File Prior to Visit   Medication Sig Dispense Refill    oxyCODONE-acetaminophen (PERCOCET) 5-325 mg per tablet Take 1 tablet by mouth every 4 (four) hours as needed for moderate pain      calcium carbonate-vitamin D (OSCAL-D) 500 mg-200 units per tablet TAKE 1 TABLET BY MOUTH 2 (TWO) TIMES A DAY WITH MEALS FOR 30 DAYS 60 tablet 0    capsicum (ZOSTRIX) 0 075 % topical cream Apply topically 3 (three) times a day 28 3 g 0    diclofenac sodium (VOLTAREN) 1 % Apply 2 g topically 4 (four) times a day 100 g 0    ferrous sulfate 325 (65 Fe) mg tablet TAKE 1 TABLET (325 MG TOTAL) BY MOUTH 2 (TWO) TIMES A DAY FOR 90 DAYS 180 tablet 0    levothyroxine 25 mcg tablet Take 25 mcg by mouth daily        LORazepam (ATIVAN) 2 mg tablet TAKE 1 TABLET (2 MG TOTAL) BY MOUTH EVERY 8 (EIGHT) HOURS AS NEEDED FOR ANXIETY 90 tablet 1    omeprazole (PriLOSEC) 40 MG capsule Take 80 mg by mouth 2 (two) times a day        omeprazole (PriLOSEC) 40 MG capsule TAKE ONE CAPSULE BY MOUTH TWO TIMES A  capsule 1    PARoxetine (PAXIL) 30 mg tablet Take 2 tablets (60 mg total) by mouth every morning 180 tablet 3    QUEtiapine (SEROquel) 400 MG tablet TAKE 1 TABLET (400 MG TOTAL) BY MOUTH DAILY AT BEDTIME 30 tablet 5    traMADol (ULTRAM) 50 mg tablet Take 1 tablet (50 mg total) by mouth every 6 (six) hours as needed for moderate pain 30 tablet 0    triamcinolone (KENALOG) 0 1 % cream APPLY TO AFFECTED AREA TWICE A DAY 80 g 2    ziprasidone (GEODON) 80 mg capsule Take 1 capsule (80 mg total) by mouth 2 (two) times a day 180 capsule 2     No current facility-administered medications on file prior to visit  Social History     Tobacco Use    Smoking status: Never Smoker    Smokeless tobacco: Never Used   Substance Use Topics    Alcohol use: Never     Frequency: Never    Drug use: No       Family History   Problem Relation Age of Onset    Uterine cancer Mother     Esophageal cancer Father     Colon cancer Maternal Grandmother        Review of Systems     As stated in the HPI  All other systems were reviewed and are negative        Scribe Attestation    I,:   Mayda Sewell PA-C am acting as a scribe while in the presence of the attending physician :        I,:   Baron Gustavo MD personally performed the services described in this documentation    as scribed in my presence :

## 2019-11-01 ENCOUNTER — OFFICE VISIT (OUTPATIENT)
Dept: PAIN MEDICINE | Facility: CLINIC | Age: 59
End: 2019-11-01
Payer: COMMERCIAL

## 2019-11-01 VITALS
HEART RATE: 82 BPM | BODY MASS INDEX: 27.82 KG/M2 | DIASTOLIC BLOOD PRESSURE: 86 MMHG | SYSTOLIC BLOOD PRESSURE: 143 MMHG | WEIGHT: 157 LBS | HEIGHT: 63 IN

## 2019-11-01 DIAGNOSIS — M16.11 PRIMARY OSTEOARTHRITIS OF RIGHT HIP: ICD-10-CM

## 2019-11-01 DIAGNOSIS — M25.551 RIGHT HIP PAIN: ICD-10-CM

## 2019-11-01 DIAGNOSIS — G89.29 CHRONIC BILATERAL LOW BACK PAIN WITHOUT SCIATICA: ICD-10-CM

## 2019-11-01 DIAGNOSIS — M43.16 SPONDYLOLISTHESIS AT L4-L5 LEVEL: ICD-10-CM

## 2019-11-01 DIAGNOSIS — G89.4 CHRONIC PAIN SYNDROME: Primary | ICD-10-CM

## 2019-11-01 DIAGNOSIS — M54.50 CHRONIC BILATERAL LOW BACK PAIN WITHOUT SCIATICA: ICD-10-CM

## 2019-11-01 DIAGNOSIS — M47.816 LUMBAR SPONDYLOSIS: ICD-10-CM

## 2019-11-01 DIAGNOSIS — M51.36 DDD (DEGENERATIVE DISC DISEASE), LUMBAR: ICD-10-CM

## 2019-11-01 PROCEDURE — 99213 OFFICE O/P EST LOW 20 MIN: CPT | Performed by: NURSE PRACTITIONER

## 2019-11-01 NOTE — PROGRESS NOTES
Assessment:  1  Chronic pain syndrome    2  Chronic bilateral low back pain without sciatica    3  Right hip pain    4  Lumbar spondylosis    5  Primary osteoarthritis of right hip    6  Spondylolisthesis at L4-L5 level    7  DDD (degenerative disc disease), lumbar        Plan:  While the patient was in the office today, I did have a thorough conversation with the patient regarding her chronic pain syndrome and treatment plan options  I explained to the patient that with regards to her low back pain, unfortunately she is not a candidate to proceed with the radiofrequency ablation procedure because her diagnostic medial branch block was negative upon review of the diary  However, I do feel there is definite inflammatory component and we could consider a lumbar epidural steroid injection with Dr Braxton Farr to see if that would be somewhat helpful for now as I feel that until she has her hip replaced her back pain is not going to get any better because of the way she has to walk and with the antalgic gait  However, at this point the patient is not interested in an epidural steroid injection  I did discuss with the patient that I feel it is in her best interest to concentrate on her hip replacement surgery and once she is healed from that we can re-evaluate and see what her back pain is like and proceed from there as appropriate  The patient was agreeable and verbalized an understanding  I discussed with the patient that at this point time she can followup with our office on an as-needed basis  I did review the patient that if her pain symptoms should change, worsen, and/or if she would experience any new symptoms as she would like to be evaluated for, she should give our office a call  The patient was agreeable and verbalized an understanding  History of Present Illness:     The patient is a 61 y o  female last seen on 7/30/19 who presents for a follow up office visit in regards to chronic pain syndrome secondary to lumbar spondylosis with degenerative disc disease and primary osteoarthritis of the right hip  The patient currently reports that since her last office visit her right hip and low back pain continues to worsen and she did follow-up with Dr Harrison Ann, who at this point has referred her to Dr Ann Rowley to discuss a right total hip replacement utilizing the anterior approach as the patient needs to have surgery with the least amount of healing time as 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  However, the patient presents today to discuss her low back pain as she would like to know she can proceed with the lumbar radiofrequency ablation procedure as previously discussed  I have personally reviewed and/or updated the patient's past medical history, past surgical history, family history, social history, current medications, allergies, and vital signs today  Review of Systems:    Review of Systems   Respiratory: Negative for shortness of breath  Cardiovascular: Negative for chest pain  Gastrointestinal: Positive for nausea  Negative for constipation, diarrhea and vomiting  Musculoskeletal: Positive for gait problem  Negative for arthralgias, joint swelling and myalgias  Skin: Negative for rash  Neurological: Negative for dizziness, seizures and weakness  All other systems reviewed and are negative          Past Medical History:   Diagnosis Date    Anxiety     Back pain at L4-L5 level     Schreiber esophagus     Bulimia     Disease of thyroid gland     hypothyroid    GERD (gastroesophageal reflux disease)     Hyperlipidemia     Hypothyroidism     Leukopenia     Sciatica     Seizure (HCC)     Synovial cyst of lumbar spine     Vertigo     Weight gain        Past Surgical History:   Procedure Laterality Date    BONE MARROW BIOPSY      BREAST SURGERY      enlargement     RHINOPLASTY         Family History   Problem Relation Age of Onset    Uterine cancer Mother     Esophageal cancer Father     Colon cancer Maternal Grandmother        Social History     Occupational History    Not on file   Tobacco Use    Smoking status: Never Smoker    Smokeless tobacco: Never Used   Substance and Sexual Activity    Alcohol use: Never     Frequency: Never    Drug use: No    Sexual activity: Not on file         Current Outpatient Medications:     levothyroxine 25 mcg tablet, Take 25 mcg by mouth daily  , Disp: , Rfl:     LORazepam (ATIVAN) 2 mg tablet, TAKE 1 TABLET (2 MG TOTAL) BY MOUTH EVERY 8 (EIGHT) HOURS AS NEEDED FOR ANXIETY, Disp: 90 tablet, Rfl: 1    omeprazole (PriLOSEC) 40 MG capsule, TAKE ONE CAPSULE BY MOUTH TWO TIMES A DAY, Disp: 180 capsule, Rfl: 1    oxyCODONE-acetaminophen (PERCOCET) 5-325 mg per tablet, Take 1 tablet by mouth every 4 (four) hours as needed for moderate pain, Disp: , Rfl:     PARoxetine (PAXIL) 30 mg tablet, Take 2 tablets (60 mg total) by mouth every morning, Disp: 180 tablet, Rfl: 3    QUEtiapine (SEROquel) 400 MG tablet, TAKE 1 TABLET (400 MG TOTAL) BY MOUTH DAILY AT BEDTIME, Disp: 30 tablet, Rfl: 5    ziprasidone (GEODON) 80 mg capsule, Take 1 capsule (80 mg total) by mouth 2 (two) times a day, Disp: 180 capsule, Rfl: 2    calcium carbonate-vitamin D (OSCAL-D) 500 mg-200 units per tablet, TAKE 1 TABLET BY MOUTH 2 (TWO) TIMES A DAY WITH MEALS FOR 30 DAYS, Disp: 60 tablet, Rfl: 0    ferrous sulfate 325 (65 Fe) mg tablet, TAKE 1 TABLET (325 MG TOTAL) BY MOUTH 2 (TWO) TIMES A DAY FOR 90 DAYS, Disp: 180 tablet, Rfl: 0    omeprazole (PriLOSEC) 40 MG capsule, Take 80 mg by mouth 2 (two) times a day  , Disp: , Rfl:     triamcinolone (KENALOG) 0 1 % cream, APPLY TO AFFECTED AREA TWICE A DAY (Patient not taking: Reported on 11/1/2019), Disp: 80 g, Rfl: 2    Allergies   Allergen Reactions    Latex     Risperdal [Risperidone]     Zyprexa [Olanzapine]        Physical Exam:    /86   Pulse 82   Ht 5' 3" (1 6 m)   Wt 71 2 kg (157 lb)   BMI 27 81 kg/m² Constitutional:normal, well developed, well nourished, alert, in no distress and non-toxic and no overt pain behavior  Eyes:anicteric  HEENT:grossly intact  Neck:supple, symmetric, trachea midline and no masses   Pulmonary:even and unlabored  Cardiovascular:No edema or pitting edema present  Skin:Normal without rashes or lesions and well hydrated  Psychiatric:Mood and affect appropriate  Neurologic:Cranial Nerves II-XII grossly intact  Musculoskeletal:The patient's gait was slightly antalgic, painful, but steady with the use of a rolling walker  Imaging  No orders to display         No orders of the defined types were placed in this encounter

## 2019-11-04 ENCOUNTER — TELEPHONE (OUTPATIENT)
Dept: OBGYN CLINIC | Facility: HOSPITAL | Age: 59
End: 2019-11-04

## 2019-11-04 NOTE — TELEPHONE ENCOUNTER
Patient is calling to find out if she can be seen before her scheduled appt on 11/21/19  Patient is being referred by Dr Pamela Ahumada due to hip pain  Patient is stating that she is in a lot of pain  At this time there is no openings sooner than 11/21/19    Please let the patient know [phone]

## 2019-11-06 NOTE — TELEPHONE ENCOUNTER
LM advising soonest could be 11/14/19 at 1030a  Advised to call back for confirmation  Patient will need to be forced on his schedule

## 2019-11-06 NOTE — TELEPHONE ENCOUNTER
Patient is calling in wanting to know if her appt with  can be moved up? She states she is in a lot of pain  There is no available as in right now in his schedule   Please advise, thank you       Cb#: [phone]

## 2019-11-07 DIAGNOSIS — Z76.0 ENCOUNTER FOR MEDICATION REFILL: ICD-10-CM

## 2019-11-07 RX ORDER — QUETIAPINE FUMARATE 400 MG/1
400 TABLET, FILM COATED ORAL
Qty: 90 TABLET | Refills: 3 | Status: SHIPPED | OUTPATIENT
Start: 2019-11-07 | End: 2020-01-14 | Stop reason: HOSPADM

## 2019-11-07 NOTE — TELEPHONE ENCOUNTER
Cathy Cho called in checking on a sooner appointment  I relayed message above  Patient would like to come in 10/14 @ 10:30am  Would you like for me to reach out to [facility] for a force appt ?

## 2019-11-13 DIAGNOSIS — F41.9 ANXIETY: ICD-10-CM

## 2019-11-13 RX ORDER — LORAZEPAM 2 MG/1
2 TABLET ORAL EVERY 8 HOURS PRN
Qty: 90 TABLET | Refills: 1 | Status: ON HOLD | OUTPATIENT
Start: 2019-11-13 | End: 2020-01-13 | Stop reason: SDUPTHER

## 2019-11-14 ENCOUNTER — PREP FOR PROCEDURE (OUTPATIENT)
Dept: OBGYN CLINIC | Facility: HOSPITAL | Age: 59
End: 2019-11-14

## 2019-11-14 ENCOUNTER — TRANSCRIBE ORDERS (OUTPATIENT)
Dept: LAB | Facility: HOSPITAL | Age: 59
End: 2019-11-14

## 2019-11-14 ENCOUNTER — APPOINTMENT (OUTPATIENT)
Dept: LAB | Facility: HOSPITAL | Age: 59
End: 2019-11-14
Payer: COMMERCIAL

## 2019-11-14 ENCOUNTER — OFFICE VISIT (OUTPATIENT)
Dept: OBGYN CLINIC | Facility: HOSPITAL | Age: 59
End: 2019-11-14
Payer: COMMERCIAL

## 2019-11-14 ENCOUNTER — TELEPHONE (OUTPATIENT)
Dept: CCU | Facility: HOSPITAL | Age: 59
End: 2019-11-14

## 2019-11-14 ENCOUNTER — HOSPITAL ENCOUNTER (OUTPATIENT)
Dept: RADIOLOGY | Facility: HOSPITAL | Age: 59
Discharge: HOME | End: 2019-11-14
Attending: ORTHOPAEDIC SURGERY
Payer: COMMERCIAL

## 2019-11-14 VITALS
BODY MASS INDEX: 28.7 KG/M2 | SYSTOLIC BLOOD PRESSURE: 148 MMHG | HEIGHT: 63 IN | WEIGHT: 162 LBS | DIASTOLIC BLOOD PRESSURE: 84 MMHG | HEART RATE: 96 BPM

## 2019-11-14 DIAGNOSIS — D50.8 IRON DEFICIENCY ANEMIA SECONDARY TO INADEQUATE DIETARY IRON INTAKE: Primary | ICD-10-CM

## 2019-11-14 DIAGNOSIS — Z01.812 PRE-OPERATIVE LABORATORY EXAMINATION: ICD-10-CM

## 2019-11-14 DIAGNOSIS — M16.11 PRIMARY OSTEOARTHRITIS OF RIGHT HIP: ICD-10-CM

## 2019-11-14 DIAGNOSIS — M16.11 PRIMARY OSTEOARTHRITIS OF RIGHT HIP: Primary | ICD-10-CM

## 2019-11-14 DIAGNOSIS — Z01.812 PRE-OPERATIVE LABORATORY EXAMINATION: Primary | ICD-10-CM

## 2019-11-14 LAB
ABO GROUP BLD: NORMAL
ALBUMIN SERPL BCP-MCNC: 3.8 G/DL (ref 3.5–5)
ALP SERPL-CCNC: 100 U/L (ref 46–116)
ALT SERPL W P-5'-P-CCNC: 21 U/L (ref 12–78)
ANION GAP SERPL CALCULATED.3IONS-SCNC: 4 MMOL/L (ref 4–13)
APTT PPP: 30 SECONDS (ref 23–37)
AST SERPL W P-5'-P-CCNC: 16 U/L (ref 5–45)
BASOPHILS # BLD AUTO: 0.05 THOUSANDS/ΜL (ref 0–0.1)
BASOPHILS NFR BLD AUTO: 1 % (ref 0–1)
BILIRUB SERPL-MCNC: 0.31 MG/DL (ref 0.2–1)
BLD GP AB SCN SERPL QL: NEGATIVE
BUN SERPL-MCNC: 7 MG/DL (ref 5–25)
CALCIUM SERPL-MCNC: 9.5 MG/DL (ref 8.3–10.1)
CHLORIDE SERPL-SCNC: 94 MMOL/L (ref 100–108)
CO2 SERPL-SCNC: 27 MMOL/L (ref 21–32)
CREAT SERPL-MCNC: 0.67 MG/DL (ref 0.6–1.3)
CRP SERPL QL: 16.1 MG/L
EOSINOPHIL # BLD AUTO: 0.07 THOUSAND/ΜL (ref 0–0.61)
EOSINOPHIL NFR BLD AUTO: 2 % (ref 0–6)
ERYTHROCYTE [DISTWIDTH] IN BLOOD BY AUTOMATED COUNT: 16.3 % (ref 11.6–15.1)
EST. AVERAGE GLUCOSE BLD GHB EST-MCNC: 94 MG/DL
FERRITIN SERPL-MCNC: 11 NG/ML (ref 8–388)
GFR SERPL CREATININE-BSD FRML MDRD: 97 ML/MIN/1.73SQ M
GLUCOSE SERPL-MCNC: 86 MG/DL (ref 65–140)
HBA1C MFR BLD: 4.9 % (ref 4.2–6.3)
HCT VFR BLD AUTO: 31.8 % (ref 34.8–46.1)
HGB BLD-MCNC: 9.6 G/DL (ref 11.5–15.4)
IMM GRANULOCYTES # BLD AUTO: 0.03 THOUSAND/UL (ref 0–0.2)
IMM GRANULOCYTES NFR BLD AUTO: 1 % (ref 0–2)
INR PPP: 0.99 (ref 0.84–1.19)
IRON SATN MFR SERPL: 5 %
IRON SERPL-MCNC: 28 UG/DL (ref 50–170)
LYMPHOCYTES # BLD AUTO: 0.98 THOUSANDS/ΜL (ref 0.6–4.47)
LYMPHOCYTES NFR BLD AUTO: 20 % (ref 14–44)
MCH RBC QN AUTO: 22.2 PG (ref 26.8–34.3)
MCHC RBC AUTO-ENTMCNC: 30.2 G/DL (ref 31.4–37.4)
MCV RBC AUTO: 73 FL (ref 82–98)
MONOCYTES # BLD AUTO: 0.4 THOUSAND/ΜL (ref 0.17–1.22)
MONOCYTES NFR BLD AUTO: 8 % (ref 4–12)
NEUTROPHILS # BLD AUTO: 3.28 THOUSANDS/ΜL (ref 1.85–7.62)
NEUTS SEG NFR BLD AUTO: 68 % (ref 43–75)
NRBC BLD AUTO-RTO: 0 /100 WBCS
PLATELET # BLD AUTO: 348 THOUSANDS/UL (ref 149–390)
PMV BLD AUTO: 8.3 FL (ref 8.9–12.7)
POTASSIUM SERPL-SCNC: 4.1 MMOL/L (ref 3.5–5.3)
PROT SERPL-MCNC: 7.9 G/DL (ref 6.4–8.2)
PROTHROMBIN TIME: 12.7 SECONDS (ref 11.6–14.5)
RBC # BLD AUTO: 4.33 MILLION/UL (ref 3.81–5.12)
RH BLD: POSITIVE
SODIUM SERPL-SCNC: 125 MMOL/L (ref 136–145)
SPECIMEN EXPIRATION DATE: NORMAL
TIBC SERPL-MCNC: 534 UG/DL (ref 250–450)
WBC # BLD AUTO: 4.81 THOUSAND/UL (ref 4.31–10.16)

## 2019-11-14 PROCEDURE — 72170 X-RAY EXAM OF PELVIS: CPT

## 2019-11-14 PROCEDURE — 99214 OFFICE O/P EST MOD 30 MIN: CPT | Performed by: ORTHOPAEDIC SURGERY

## 2019-11-14 PROCEDURE — 83550 IRON BINDING TEST: CPT

## 2019-11-14 PROCEDURE — 83540 ASSAY OF IRON: CPT

## 2019-11-14 PROCEDURE — 36415 COLL VENOUS BLD VENIPUNCTURE: CPT

## 2019-11-14 PROCEDURE — 86901 BLOOD TYPING SEROLOGIC RH(D): CPT

## 2019-11-14 PROCEDURE — 85730 THROMBOPLASTIN TIME PARTIAL: CPT

## 2019-11-14 PROCEDURE — 86850 RBC ANTIBODY SCREEN: CPT

## 2019-11-14 PROCEDURE — 80053 COMPREHEN METABOLIC PANEL: CPT

## 2019-11-14 PROCEDURE — 86900 BLOOD TYPING SEROLOGIC ABO: CPT

## 2019-11-14 PROCEDURE — 85610 PROTHROMBIN TIME: CPT

## 2019-11-14 PROCEDURE — 83036 HEMOGLOBIN GLYCOSYLATED A1C: CPT

## 2019-11-14 PROCEDURE — 85025 COMPLETE CBC W/AUTO DIFF WBC: CPT

## 2019-11-14 PROCEDURE — 86140 C-REACTIVE PROTEIN: CPT

## 2019-11-14 PROCEDURE — 82728 ASSAY OF FERRITIN: CPT

## 2019-11-14 RX ORDER — SODIUM CHLORIDE 9 MG/ML
20 INJECTION, SOLUTION INTRAVENOUS ONCE
Status: CANCELLED | OUTPATIENT
Start: 2019-11-18

## 2019-11-14 RX ORDER — FOLIC ACID 1 MG/1
1 TABLET ORAL DAILY
Qty: 30 TABLET | Refills: 0 | Status: SHIPPED | OUTPATIENT
Start: 2019-11-14 | End: 2020-03-30 | Stop reason: ALTCHOICE

## 2019-11-14 RX ORDER — SODIUM CHLORIDE, SODIUM LACTATE, POTASSIUM CHLORIDE, CALCIUM CHLORIDE 600; 310; 30; 20 MG/100ML; MG/100ML; MG/100ML; MG/100ML
125 INJECTION, SOLUTION INTRAVENOUS CONTINUOUS
Status: CANCELLED | OUTPATIENT
Start: 2019-11-14

## 2019-11-14 RX ORDER — CHLORHEXIDINE GLUCONATE 4 G/100ML
SOLUTION TOPICAL DAILY PRN
Status: CANCELLED | OUTPATIENT
Start: 2019-11-14

## 2019-11-14 RX ORDER — CHLORHEXIDINE GLUCONATE 0.12 MG/ML
15 RINSE ORAL ONCE
Status: CANCELLED | OUTPATIENT
Start: 2019-11-14 | End: 2019-11-14

## 2019-11-14 RX ORDER — CEFAZOLIN SODIUM 2 G/50ML
2000 SOLUTION INTRAVENOUS ONCE
Status: CANCELLED | OUTPATIENT
Start: 2019-11-14 | End: 2019-11-14

## 2019-11-14 RX ORDER — ASCORBIC ACID 500 MG
500 TABLET ORAL 2 TIMES DAILY
Qty: 60 TABLET | Refills: 0 | Status: SHIPPED | OUTPATIENT
Start: 2019-11-14 | End: 2020-03-30 | Stop reason: ALTCHOICE

## 2019-11-14 RX ORDER — FERROUS SULFATE TAB EC 324 MG (65 MG FE EQUIVALENT) 324 (65 FE) MG
324 TABLET DELAYED RESPONSE ORAL
Qty: 60 TABLET | Refills: 0 | Status: SHIPPED | OUTPATIENT
Start: 2019-11-14 | End: 2020-03-30 | Stop reason: ALTCHOICE

## 2019-11-14 RX ORDER — GABAPENTIN 300 MG/1
300 CAPSULE ORAL ONCE
Status: CANCELLED | OUTPATIENT
Start: 2019-11-14 | End: 2019-11-14

## 2019-11-14 RX ORDER — ACETAMINOPHEN 325 MG/1
975 TABLET ORAL ONCE
Status: CANCELLED | OUTPATIENT
Start: 2019-11-14 | End: 2019-11-14

## 2019-11-14 RX ORDER — MULTIVITAMIN
1 CAPSULE ORAL DAILY
Qty: 30 CAPSULE | Refills: 0 | Status: ON HOLD | OUTPATIENT
Start: 2019-11-14 | End: 2022-01-12 | Stop reason: CLARIF

## 2019-11-14 NOTE — TELEPHONE ENCOUNTER
11/14/19  4:02 PM  Left voicemail to discuss surgical optimization  11/15/19  9:43 AM  Spoke with patient and reviewed labs which showed iron deficiency anemia which is most likely due chronic blood loss or inadequate oral intake  Referral placed to GI  Message sent to PCP who will ensure she has had age appropriate cancer screenings as well  With  planned joint arthoplasty, we will need to optimize her further which will include infusions of Feraheme  I scheduled her at the infusion center of her choice ([facility])  Following infusions assuming expected hematologic response, can proceed to surgery  She understands that surgery may be delayed  All questions answered to [facility] ANNAMARIA Plascencia'dot Keller DO  [facility]     ----- Message from Crispin Harrison RN sent at 11/14/2019  3:35 PM EST -----  Regarding: Hgb 9 6  Dr Ra Vigil,    Christel Garzon- The patient you signed up today for surgery on 12/2, HGB resulted at 9 6  Back in May it was less than 11 also  Dr Antony Gonzalez, please review case  Surgery is 12/2  Please advise       Dena Connell

## 2019-11-14 NOTE — PROGRESS NOTES
Patient Name:  Junie Pina  MRN:  [id number]    Assessment & Plan     Right hip osteoarthritis   1  Right anterior total hip arthroplasty was discussed at length with Makenna Arrington and her mother today, including risks and benefits   2  She will require ABX prior to dental procedures, life long requirement   3  An anterior total hip model was shown in the office today   4  She was provided with literature regarding the anterior approach  5  Makenna Arrington would like to undergo surgical intervention as soon as possible due to her pain   6  Right anterior total hip arthroplasty surgical consent was signed in the office today   7  Follow up after surgery with repeat right hip x-ray's       Subjective     61 y o  female presents to the office today for right hip pain  Makenna Arrington states that she has been experiencing right hip pain for aprox  4 months time  She states her pain is localized to her groin  She has underwent 2 intra-articular right hip CSI's in the past, most recent 08/14/19 without relief significant relief of her symptoms  Makenna Arrington also notes ambulatory difficulty recently due to her symptoms  She is a referral from Dr Malcolm Pearl to discuss an anterior right total hip per her request as 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 and would like the fastest recovery possible  General ROS:  Negative for fever or chills  Neurological ROS:  Negative for numbness or tingling  Objective     /84   Pulse 96   Ht 5' 3" (1 6 m)   Wt 73 5 kg (162 lb)   BMI 28 70 kg/m²     Right hip:   Ambulates in a wheelchair today   Limited ROM, painful   No tenderness to palpation   + Aristeo's test   - log roll   Sensation intact to light touch   Skin warm and well perfused   Psychiatric: Mood and affect are appropriate  Data Review     I have personally reviewed pertinent films in PACS, and my interpretation follows      X-ray right hip: severe right hip osteoarthritis, slight scoliosis        Social History Tobacco Use    Smoking status: Never Smoker    Smokeless tobacco: Never Used   Substance Use Topics    Alcohol use: Never     Frequency: Never    Drug use: No       Scribe Attestation    I,:   Jordon Fried am acting as a scribe while in the presence of the attending physician :        I,:   Souleymane Valderrama MD personally performed the services described in this documentation    as scribed in my presence :

## 2019-11-14 NOTE — PRE-PROCEDURE INSTRUCTIONS
Pre-Surgery Instructions:   Medication Instructions    levothyroxine 25 mcg tablet Instructed patient per Anesthesia Guidelines  TAKE DAY OF SURGERY    LORazepam (ATIVAN) 2 mg tablet Instructed patient per Anesthesia Guidelines  MAY TAKE DAY OF PROCEDURE    omeprazole (PriLOSEC) 40 MG capsule Instructed patient per Anesthesia Guidelines  TAKE DAY OF PROCEDURE    oxyCODONE-acetaminophen (PERCOCET) 5-325 mg per tablet Instructed patient per Anesthesia Guidelines  MAY TAKE DAY OF PROCDDURE    PARoxetine (PAXIL) 30 mg tablet Instructed patient per Anesthesia Guidelines  TAKE DAY OF PROCEDURE    QUEtiapine (SEROquel) 400 MG tablet Instructed patient per Anesthesia Guidelines  TAKE DAY OF PROCEDURE    triamcinolone (KENALOG) 0 1 % cream Instructed patient per Anesthesia Guidelines  DO NOT USE   Education Index     Med Instructions Troubleshoot   Acetaminophen Med Class     Continue to take this medication on your normal schedule  If this is an oral medication and you take it in the morning, then you may take this medicine with a sip of water  Antidepressant Med Class     Continue to take this medication on your normal schedule  If this is an oral medication and you take it in the morning, then you may take this medicine with a sip of water  Antipsychotic Med Class     Continue to take this medication on your normal schedule  If this is an oral medication and you take it in the morning, then you may take this medicine with a sip of water  Benzodiazepine antagonist Med Class     If this medication is needed please continue to take on your normal schedule  If you take it in the morning, then you may take this medicine with a sip of water  Opioid Med Class     Continue to take this medication on your normal schedule  If this is an oral medication and you take it in the morning, then you may take this medicine with a sip of water  Thyroxine Med Class     Continue to take this medication on your normal schedule  If this is an oral medication and you take it in the morning, then you may take this medicine with a sip of water  Pre procedure instructions reviewed verbalizes understanding  Written instructions provided at this time  Incentive spirometer, surgical soap, and wipes revived and given to patient  Patient to  orthopedic vitamins and started as soon as possible  Lovenox instructions reviewed

## 2019-11-15 ENCOUNTER — TELEPHONE (OUTPATIENT)
Dept: OBGYN CLINIC | Facility: HOSPITAL | Age: 59
End: 2019-11-15

## 2019-11-15 NOTE — TELEPHONE ENCOUNTER
Called and spoke with the pt  Pt confirms she can attend the infusions scheduled for 11/19 @ 1000 and 11/25 @ 0900  Pt denies barriers to attending these appts  She is aware of the potential of her needing to see GI pre-op  Pt also made aware we need repeat lab work at a [facility]'s lab (preferably [facility]) on the AM of Friday 11/29 to evaluate if pt safe and OK to proceed to Monday 12/2 surgery  Pt aware there is still a potential to delay her OR date

## 2019-11-16 NOTE — TELEPHONE ENCOUNTER
GI Evaluation may be either pre-op or post-op as thanh morales  Will just need a CBC on 11/29 to see if she has an adequate response

## 2019-11-18 ENCOUNTER — CONSULT (OUTPATIENT)
Dept: INTERNAL MEDICINE CLINIC | Facility: CLINIC | Age: 59
End: 2019-11-18
Payer: COMMERCIAL

## 2019-11-18 VITALS
DIASTOLIC BLOOD PRESSURE: 82 MMHG | WEIGHT: 155.8 LBS | HEIGHT: 63 IN | OXYGEN SATURATION: 97 % | HEART RATE: 95 BPM | TEMPERATURE: 98.4 F | SYSTOLIC BLOOD PRESSURE: 140 MMHG | BODY MASS INDEX: 27.61 KG/M2

## 2019-11-18 DIAGNOSIS — Z01.812 PRE-OPERATIVE LABORATORY EXAMINATION: ICD-10-CM

## 2019-11-18 DIAGNOSIS — Z01.818 PREOP EXAM FOR INTERNAL MEDICINE: Primary | ICD-10-CM

## 2019-11-18 DIAGNOSIS — F25.1 SCHIZOAFFECTIVE DISORDER, DEPRESSIVE TYPE (HCC): Chronic | ICD-10-CM

## 2019-11-18 DIAGNOSIS — E03.9 ACQUIRED HYPOTHYROIDISM: ICD-10-CM

## 2019-11-18 DIAGNOSIS — I10 ESSENTIAL HYPERTENSION: ICD-10-CM

## 2019-11-18 PROCEDURE — 99214 OFFICE O/P EST MOD 30 MIN: CPT | Performed by: INTERNAL MEDICINE

## 2019-11-18 NOTE — PATIENT INSTRUCTIONS
Problem List Items Addressed This Visit        Endocrine    Acquired hypothyroidism     Continue levothyroxine            Cardiovascular and Mediastinum    Essential hypertension     Borderline, no need for medications, continue monitoring            Other    Schizoaffective disorder, depressive type (HCC) (Chronic)     Continue meds and follow up psych         Preop exam for internal medicine - Primary     Patient is medically cleared for surgery, no cardiopulmonary complaints, exam today is normal            Other Visit Diagnoses     Pre-operative laboratory examination

## 2019-11-18 NOTE — PROGRESS NOTES
Assessment/Plan:    Preop exam for internal medicine  Patient is medically cleared for surgery, no cardiopulmonary complaints, exam today is normal     Acquired hypothyroidism  Continue levothyroxine    Essential hypertension  Borderline, no need for medications, continue monitoring    Schizoaffective disorder, depressive type ([facility] [address] )  Continue meds and follow up psych       Diagnoses and all orders for this visit:    Preop exam for internal medicine    Pre-operative laboratory examination  -     Ambulatory referral to Family Practice    Schizoaffective disorder, depressive type ([facility] )    Acquired hypothyroidism    Essential hypertension          Subjective:      Patient ID: Ed Frost is a 61 y o  female  Patient here for preop medical clearance  No cardiopulmonary complaints, no chest pain, no shortness of breath, no lightheadedness  No history of blood clots  No problems with anesthesia in the past   Patient feels in her usual state of health      The following portions of the patient's history were reviewed and updated as appropriate: allergies, current medications, past family history, past medical history, past social history, past surgical history and problem list     Review of Systems   Constitutional: Negative for chills, fatigue and fever  HENT: Negative for congestion, nosebleeds, postnasal drip, sore throat and trouble swallowing  Eyes: Negative for pain  Respiratory: Negative for cough, chest tightness, shortness of breath and wheezing  Cardiovascular: Negative for chest pain, palpitations and leg swelling  Gastrointestinal: Negative for abdominal pain, constipation, diarrhea, nausea and vomiting  Endocrine: Negative for polydipsia and polyuria  Genitourinary: Negative for dysuria, flank pain and hematuria  Musculoskeletal: Positive for arthralgias  Negative for back pain  Skin: Negative for rash  Neurological: Negative for dizziness, tremors and headaches  Hematological: Does not bruise/bleed easily  Psychiatric/Behavioral: Negative for confusion and dysphoric mood  The patient is not nervous/anxious  Objective:      /82   Pulse 95   Temp 98 4 °F (36 9 °C)   Ht 5' 3" (1 6 m)   Wt 70 7 kg (155 lb 12 8 oz)   SpO2 97%   BMI 27 60 kg/m²          Physical Exam   Constitutional: She is oriented to person, place, and time  She appears well-developed and well-nourished  No distress  HENT:   Head: Normocephalic and atraumatic  Right Ear: External ear normal    Left Ear: External ear normal    Eyes: Conjunctivae are normal  No scleral icterus  Neck: Normal range of motion  Neck supple  No tracheal deviation present  No thyromegaly present  Cardiovascular: Normal rate, regular rhythm and normal heart sounds  No murmur heard  Pulmonary/Chest: Effort normal and breath sounds normal  No respiratory distress  She has no wheezes  She has no rales  Abdominal: Soft  Bowel sounds are normal  There is no tenderness  There is no rebound and no guarding  Musculoskeletal: She exhibits no edema  Lymphadenopathy:     She has no cervical adenopathy  Neurological: She is alert and oriented to person, place, and time  Psychiatric: She has a normal mood and affect  Her behavior is normal  Judgment and thought content normal    Vitals reviewed

## 2019-11-19 ENCOUNTER — TELEPHONE (OUTPATIENT)
Dept: OBGYN CLINIC | Facility: HOSPITAL | Age: 59
End: 2019-11-19

## 2019-11-19 ENCOUNTER — HOSPITAL ENCOUNTER (OUTPATIENT)
Dept: INFUSION CENTER | Facility: HOSPITAL | Age: 59
Discharge: HOME | End: 2019-11-19
Attending: ANESTHESIOLOGY
Payer: COMMERCIAL

## 2019-11-19 VITALS
HEART RATE: 66 BPM | RESPIRATION RATE: 18 BRPM | DIASTOLIC BLOOD PRESSURE: 70 MMHG | WEIGHT: 162.04 LBS | TEMPERATURE: 98.7 F | SYSTOLIC BLOOD PRESSURE: 128 MMHG | BODY MASS INDEX: 28.7 KG/M2

## 2019-11-19 DIAGNOSIS — Z01.812 PRE-OPERATIVE LABORATORY EXAMINATION: Primary | ICD-10-CM

## 2019-11-19 DIAGNOSIS — M16.11 PRIMARY OSTEOARTHRITIS OF RIGHT HIP: ICD-10-CM

## 2019-11-19 DIAGNOSIS — D50.8 IRON DEFICIENCY ANEMIA SECONDARY TO INADEQUATE DIETARY IRON INTAKE: Primary | ICD-10-CM

## 2019-11-19 PROCEDURE — 96365 THER/PROPH/DIAG IV INF INIT: CPT

## 2019-11-19 RX ORDER — SODIUM CHLORIDE 9 MG/ML
20 INJECTION, SOLUTION INTRAVENOUS ONCE
Status: CANCELLED | OUTPATIENT
Start: 2019-11-26

## 2019-11-19 RX ORDER — SODIUM CHLORIDE 9 MG/ML
20 INJECTION, SOLUTION INTRAVENOUS ONCE
Status: COMPLETED | OUTPATIENT
Start: 2019-11-19 | End: 2019-11-19

## 2019-11-19 RX ADMIN — SODIUM CHLORIDE 20 ML/HR: 0.9 INJECTION, SOLUTION INTRAVENOUS at 10:32

## 2019-11-19 RX ADMIN — FERUMOXYTOL 510 MG: 510 INJECTION INTRAVENOUS at 10:51

## 2019-11-19 NOTE — TELEPHONE ENCOUNTER
CBC order placed  Cheryle Cardenas- please attempt to get this pt set up with GI pre-op if possible  Thanks!

## 2019-11-19 NOTE — PLAN OF CARE
Problem: Potential for Falls  Goal: Patient will remain free of falls  Description  INTERVENTIONS:  - Assess patient frequently for physical needs  -  Identify cognitive and physical deficits and behaviors that affect risk of falls    -  [place] fall precautions as indicated by assessment   - Educate patient/family on patient safety including physical limitations  - Instruct patient to call for assistance with activity based on assessment  - Modify environment to reduce risk of injury  - Consider OT/PT consult to assist with strengthening/mobility  Outcome: Progressing

## 2019-11-20 ENCOUNTER — TELEPHONE (OUTPATIENT)
Dept: OBGYN CLINIC | Facility: HOSPITAL | Age: 59
End: 2019-11-20

## 2019-11-20 ENCOUNTER — PATIENT OUTREACH (OUTPATIENT)
Dept: INTERNAL MEDICINE CLINIC | Facility: CLINIC | Age: 59
End: 2019-11-20

## 2019-11-20 DIAGNOSIS — M16.11 PRIMARY OSTEOARTHRITIS OF RIGHT HIP: Primary | ICD-10-CM

## 2019-11-20 NOTE — TELEPHONE ENCOUNTER
GI Consult on 11/27 @ 8am patient aware but was concerned about how far the office was and time of the day   I explained to avoid postponing surgery we would have to keep this appointment

## 2019-11-20 NOTE — PROGRESS NOTES
Incoming call from nurse navigator in Orthopedics regarding a patient and concern for patient safety and safety of patient's mother following R THR on 12/2/19  Patient is the primary CG for an elderly mother and due to physical limitations post-op may not be able to care for herself or her mother  Nurse navigator was trying to get patient assistance   Will speak to PCP regarding concerns and also call [employer] for assist

## 2019-11-22 ENCOUNTER — TELEPHONE (OUTPATIENT)
Dept: OBGYN CLINIC | Facility: HOSPITAL | Age: 59
End: 2019-11-22

## 2019-11-22 NOTE — TELEPHONE ENCOUNTER
Patient is calling   [phone]    Patient is asking to speak to the person that is covering for Manuela  Patient is stating that her mother had back sx & today she cannot walk because she had to sit in a chair for 6 hours while she waited for that sx to get over  Patient was called a pig this morning by her mother because she didn't feed the cats this morning & her mother told her she should just croak  Patient is stating that she needs a wheelchair & she wants this noted so she is not discharged too soon from the hospital because she doesn't know how she is going to get around  Patient's sx is on 12/2/19

## 2019-11-22 NOTE — TELEPHONE ENCOUNTER
Patient stated she is not sure she should be going home the day after surgery due to the situation she is experiencing at home and the decreased mobility she is having at this time  Patient stated she would like to speak to [facility] KALEB in regards to this prior to getting surgery  Please advise

## 2019-11-25 ENCOUNTER — HOSPITAL ENCOUNTER (OUTPATIENT)
Dept: INFUSION CENTER | Facility: HOSPITAL | Age: 59
Discharge: HOME | End: 2019-11-25
Attending: ANESTHESIOLOGY
Payer: COMMERCIAL

## 2019-11-25 ENCOUNTER — PATIENT OUTREACH (OUTPATIENT)
Dept: INTERNAL MEDICINE CLINIC | Facility: CLINIC | Age: 59
End: 2019-11-25

## 2019-11-25 ENCOUNTER — TELEPHONE (OUTPATIENT)
Dept: OBGYN CLINIC | Facility: HOSPITAL | Age: 59
End: 2019-11-25

## 2019-11-25 VITALS
DIASTOLIC BLOOD PRESSURE: 64 MMHG | RESPIRATION RATE: 16 BRPM | HEART RATE: 76 BPM | TEMPERATURE: 97.2 F | SYSTOLIC BLOOD PRESSURE: 128 MMHG

## 2019-11-25 DIAGNOSIS — D50.8 IRON DEFICIENCY ANEMIA SECONDARY TO INADEQUATE DIETARY IRON INTAKE: Primary | ICD-10-CM

## 2019-11-25 PROCEDURE — 96365 THER/PROPH/DIAG IV INF INIT: CPT

## 2019-11-25 RX ORDER — SODIUM CHLORIDE 9 MG/ML
20 INJECTION, SOLUTION INTRAVENOUS ONCE
Status: CANCELLED | OUTPATIENT
Start: 2019-11-26

## 2019-11-25 RX ORDER — SODIUM CHLORIDE 9 MG/ML
20 INJECTION, SOLUTION INTRAVENOUS ONCE
Status: COMPLETED | OUTPATIENT
Start: 2019-11-25 | End: 2019-11-25

## 2019-11-25 RX ADMIN — SODIUM CHLORIDE 20 ML/HR: 0.9 INJECTION, SOLUTION INTRAVENOUS at 09:25

## 2019-11-25 RX ADMIN — FERUMOXYTOL 510 MG: 510 INJECTION INTRAVENOUS at 09:45

## 2019-11-25 NOTE — TELEPHONE ENCOUNTER
Dr Pedersen   #: [phone]           Patient's mom is calling in requesting a call back  Patient is having surgery on 12/2 R hip  Mom is requesting she stay over night with patient  She stated they only have each other and she does not want leave her alone  Mom also asked if hospital will provide food for the both of them?  Please advise, thank you

## 2019-11-25 NOTE — PLAN OF CARE
Problem: Potential for Falls  Goal: Patient will remain free of falls  Description  INTERVENTIONS:  - Assess patient frequently for physical needs  -  Identify cognitive and physical deficits and behaviors that affect risk of falls    -  [place] fall precautions as indicated by assessment   - Educate patient/family on patient safety including physical limitations  - Instruct patient to call for assistance with activity based on assessment  - Modify environment to reduce risk of injury  - Consider OT/PT consult to assist with strengthening/mobility  Outcome: Progressing

## 2019-11-25 NOTE — TELEPHONE ENCOUNTER
Preoperative Elective Admission Assessment       Living Situation:  Patient reports living at home with mother, Sai Klein, who is wheelchair-bound herself  Patient reports being the caregiver for Sai Klein  But does report that mother is able to manage her own medications, meals and toileting  Patient's mother does receive Meals on wheels, 2 meals a day  Home Layout:  Patient reports having a ranch style home, with a                    Steps:  1 to enter    First Floor Setup:  Once in the home  Post-op Caregiver:  Lack of caregiver support  Mother Sai Klein reports that she would be able to care for patient after surgery however based on mother's recent spinal fusion (Week on 11/18) that she mentioned and her current use of a wheelchair and rolling walker I am concerned mother is not a states to take to care of Sarah Estrada    Post-op Transport:  Yodit Young    Outpatient Physical Therapy Site:  [facility]    DME:  Patient reports having a cane and rolling walker at home  Patient denies having a bedside commode  Patient's Current Level of Function:  Patient reports being independent with activities of daily living while on the phone doing assessment with me  However when I met patient in office with surgery scheduler the day surgery was scheduled she was in a wheelchair and reported being wheelchair-bound  Patient does report doing a care at home for her mother and around the house, 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    Medication Management:  Patient reports self managing medications, removing them daily from the bottles                      Preferred Pharmacy:  [facility]                    Blood Management Vitamins:  Patient reports taking folic acid, vitamin-C, and iron                     Post-op anticoagulant:  Patient reports postoperative Lovenox at that home ready for after surgery use only     DC Plan: Multiple calls were made with patient, patients mother, the two of them at the same time, and PCP  Pt and mother were educated that our goal, if at all possible, is to appropriately discharge patient based off their post-op function while striving to maintain maximal independence  If possible, the goal is to discharge patient to home and for them to attend outpatient physical [facility]  Surgeon is aware of home life, lack of support, and concern with patient being RW/Wheelchair bound at this time                      Barriers to DC identified preoperatively:   *Caregiver support  *Mothers support    BMI: 28 70    [employer]: Enrolled on 11/20                    RAPT: 4                    ACE/ARB Form: GFR>60                    HOOS/KOOS:    Patient Education: Pt educated on post op pain, early mobilization (POD0), indication/use of incentive spirometer (10x/hr while awake), and indication for/use of foot/leg pumps  pt encouraged to call me with questions, concerns or issues

## 2019-11-25 NOTE — TELEPHONE ENCOUNTER
We need to discuss this as a team today  I have made multiple calls since last week with this patient, and her mother  I spoke with pt's mother again this morning  I also involved PCP office  At this time I feel we all need to sit and discuss together

## 2019-11-25 NOTE — TELEPHONE ENCOUNTER
Pt contacted  Discussed with mother, Ben Torres again  Spoke with patient last week also  Ben Torres and pt calling me today after pt's infusion to further discuss again

## 2019-11-25 NOTE — TELEPHONE ENCOUNTER
Call out to CM at PCP office to follow up on information she was working on for postop care support for patient and mother

## 2019-11-25 NOTE — TELEPHONE ENCOUNTER
Patients mother is calling back regarding this request  She is stating she had a missed call from our office  Her CB # is [phone]

## 2019-11-25 NOTE — PROGRESS NOTES
Outreach TC To patient's home  Spoke with Patient's CG, Mother, Jessica Martínez  This CM asked Jessica Martínez if she was concerned about being home alone when Mira Jeong was in the hospital  CG stated no  CG is requesting that she be with the patient during her stay because "I just have to be with her " Educated CG on the fact that patient would have SN care 24 hours  Educated that the hospital has it's own set of rules and that we can't alter those rules  CG then requested that I call back when patient arrives home after 12 noon

## 2019-11-25 NOTE — PROGRESS NOTES
Outreach TC To PCP for some insight into patient and family dynamics  PCP reveals that patient is co-dependent on her Mother, Cat Mcguire  Patient's mother is often verbally abusive to staff in PCP office  Patient has verbalized to PCP that her mother is verbally abusive  Patient has stated that she would like her Mother to be placed in a nursing facility  To PCP  This CM will reach out to patient

## 2019-11-25 NOTE — TELEPHONE ENCOUNTER
Multiple calls completed today between patient, patient's mother Treva Saldivar, the 2 of them at the same time, and primary care physician , Lucy Mccarthy  I also did find out that patient's mother Treva Saldivar has an open case with protective Services  I did reach out to Shayna Polanco, who is in charge of the mother's case at [facility] and made her aware that Joy Garcia is having surgery on 12/02 and I did request a safety check of the mother  Shayna Polanco, at [facility] reports she would talk with her supervisor and notify me of potential plan  Pt and mother instructed that I am anticipating a potential SNF/REHAB stay for patient postoperatively due to currently baseline function, and inaccessible home life (RECENT CALL to pt and mother indicated clutter and inability to get around with a RW)  Pt's mother also reports having "spinal fusion 3 days ago "    Pts mother, Treva Saldivar, reports she has a friend who will drive her home from the hospital if/when pt is DC from hospital if she would then be transferred to snf/rehab  Pt and mother aware this post op outcome is based on functional ability, and will be determined post op  Pt reports mother manages her own "meds, eats and toliets" herself  Treva Saldivar herself denies needing care if Joy Garcia is not DC home after surgery  I will reach out to CM team and make them aware of upcoming surgery date  Pt and mother reminded of CBC needed to be drawn on the early morning of 12/29 at [facility]

## 2019-11-26 ENCOUNTER — PREP FOR PROCEDURE (OUTPATIENT)
Dept: OBGYN CLINIC | Facility: HOSPITAL | Age: 59
End: 2019-11-26

## 2019-11-26 ENCOUNTER — TELEPHONE (OUTPATIENT)
Dept: OBGYN CLINIC | Facility: HOSPITAL | Age: 59
End: 2019-11-26

## 2019-11-26 DIAGNOSIS — Z76.0 ENCOUNTER FOR MEDICATION REFILL: ICD-10-CM

## 2019-11-26 RX ORDER — PAROXETINE 30 MG/1
TABLET, FILM COATED ORAL
Qty: 90 TABLET | Refills: 1 | OUTPATIENT
Start: 2019-11-26

## 2019-11-26 NOTE — TELEPHONE ENCOUNTER
Pt was prescribed 180 tabs with 3 refills on 10/24/19 per EMR  The refill patient is requesting in too early at this time

## 2019-11-26 NOTE — TELEPHONE ENCOUNTER
Pt notified after discussion with surgeon to postpone surgery to 12/11 at this time  Pt is primary caregiver for elderly mother who recently had spinal fusion  Pt aware mother will need care support postoperatively and is working with mothers protective services agent Clarinda Fabry), PCP YASMIN Bell) and potential family member to arrange post op care plan for mother, Renu Hernández  Pt aware new hgb blood draw date will be 12/4, and that she is not to go for blood work this Friday, 11/29  I spoke with mother, Renu Hernández and Patient on the phone at the same time explaining all of these details  Pt and mother report they are calling "Gissel Luis" to come stay with Renu Hernández while Mychal Pack recovers post op  Pt and Renu Hernández are aware I will need confirmation from "Gissel Luis" that she indeed is coming to be Renu Hatchet' caregiver post op  Will update chart and surgeons team with final careplan once established  I did notify Protective , Marcos Kim, and PCP of the new plan

## 2019-11-27 ENCOUNTER — TELEPHONE (OUTPATIENT)
Dept: INTERNAL MEDICINE CLINIC | Facility: CLINIC | Age: 59
End: 2019-11-27

## 2019-11-27 ENCOUNTER — DOCUMENTATION (OUTPATIENT)
Dept: OBGYN CLINIC | Facility: HOSPITAL | Age: 59
End: 2019-11-27

## 2019-11-27 ENCOUNTER — TELEPHONE (OUTPATIENT)
Dept: OBGYN CLINIC | Facility: HOSPITAL | Age: 59
End: 2019-11-27

## 2019-11-27 NOTE — TELEPHONE ENCOUNTER
Nurse navigator from [facility] called to let you know that the patient's surgery has been pushed back to 12/11/19  This will allow more time for care planning  Patient will be moved to skilled nursing after surgery  Patient informed Dena Connell that her Olga Lidia Seen will be staying with the patient's mom until she returns back home      Any questions please contact Manuela

## 2019-11-27 NOTE — PROGRESS NOTES
Pharmacy:[facility] [phone]  Insurance: [id number] [employer] PaymentsuleAdmittedly to keep medication on hold  Was Authorization Required for: not required  Copayment: $1 25  Was patient advised when to pick RX up from Pharmacy:    Physician: Dr Omayra Cardenas  Surgery Date: 12/11/2019    Comments:

## 2019-11-27 NOTE — TELEPHONE ENCOUNTER
Pt contacted me today with updated care plan for her mother while she is postop  Pt is the primary caregiver for her [de-identified] yo mother  Pt reports an "Violeta Colbyach" is coming to stay with patients mother post op and staying "Until [place] "     Inessaconnie Stanford can be contacted at: [phone]  Surgeon, Inpatient CM team, residents, and nursing team made aware of patients plan and plan for mother

## 2019-11-29 ENCOUNTER — PREP FOR PROCEDURE (OUTPATIENT)
Dept: OBGYN CLINIC | Facility: CLINIC | Age: 59
End: 2019-11-29

## 2019-12-03 ENCOUNTER — PREP FOR PROCEDURE (OUTPATIENT)
Dept: OBGYN CLINIC | Facility: CLINIC | Age: 59
End: 2019-12-03

## 2019-12-04 ENCOUNTER — APPOINTMENT (OUTPATIENT)
Dept: LAB | Facility: HOSPITAL | Age: 59
DRG: 470 | End: 2019-12-04
Attending: ORTHOPAEDIC SURGERY
Payer: COMMERCIAL

## 2019-12-04 ENCOUNTER — PATIENT OUTREACH (OUTPATIENT)
Dept: INTERNAL MEDICINE CLINIC | Facility: CLINIC | Age: 59
End: 2019-12-04

## 2019-12-04 DIAGNOSIS — Z01.812 PRE-OPERATIVE LABORATORY EXAMINATION: ICD-10-CM

## 2019-12-04 DIAGNOSIS — M16.11 PRIMARY OSTEOARTHRITIS OF RIGHT HIP: ICD-10-CM

## 2019-12-04 LAB
ABO GROUP BLD: NORMAL
ALBUMIN SERPL BCP-MCNC: 4.2 G/DL (ref 3.5–5)
ALP SERPL-CCNC: 111 U/L (ref 46–116)
ALT SERPL W P-5'-P-CCNC: 21 U/L (ref 12–78)
ANION GAP SERPL CALCULATED.3IONS-SCNC: 6 MMOL/L (ref 4–13)
APTT PPP: 29 SECONDS (ref 23–37)
AST SERPL W P-5'-P-CCNC: 17 U/L (ref 5–45)
BASOPHILS # BLD AUTO: 0.03 THOUSANDS/ΜL (ref 0–0.1)
BASOPHILS NFR BLD AUTO: 1 % (ref 0–1)
BILIRUB SERPL-MCNC: 0.23 MG/DL (ref 0.2–1)
BLD GP AB SCN SERPL QL: NEGATIVE
BUN SERPL-MCNC: 6 MG/DL (ref 5–25)
CALCIUM SERPL-MCNC: 9.4 MG/DL (ref 8.3–10.1)
CHLORIDE SERPL-SCNC: 97 MMOL/L (ref 100–108)
CO2 SERPL-SCNC: 28 MMOL/L (ref 21–32)
CREAT SERPL-MCNC: 0.58 MG/DL (ref 0.6–1.3)
CRP SERPL QL: 4.8 MG/L
EOSINOPHIL # BLD AUTO: 0.01 THOUSAND/ΜL (ref 0–0.61)
EOSINOPHIL NFR BLD AUTO: 0 % (ref 0–6)
ERYTHROCYTE [DISTWIDTH] IN BLOOD BY AUTOMATED COUNT: 24.1 % (ref 11.6–15.1)
EST. AVERAGE GLUCOSE BLD GHB EST-MCNC: 82 MG/DL
FERRITIN SERPL-MCNC: 440 NG/ML (ref 8–388)
GFR SERPL CREATININE-BSD FRML MDRD: 101 ML/MIN/1.73SQ M
GLUCOSE P FAST SERPL-MCNC: 81 MG/DL (ref 65–99)
HBA1C MFR BLD: 4.5 % (ref 4.2–6.3)
HCT VFR BLD AUTO: 35.3 % (ref 34.8–46.1)
HGB BLD-MCNC: 11.2 G/DL (ref 11.5–15.4)
IMM GRANULOCYTES # BLD AUTO: 0.02 THOUSAND/UL (ref 0–0.2)
IMM GRANULOCYTES NFR BLD AUTO: 1 % (ref 0–2)
INR PPP: 0.99 (ref 0.84–1.19)
IRON SATN MFR SERPL: 15 %
IRON SERPL-MCNC: 59 UG/DL (ref 50–170)
LYMPHOCYTES # BLD AUTO: 0.41 THOUSANDS/ΜL (ref 0.6–4.47)
LYMPHOCYTES NFR BLD AUTO: 12 % (ref 14–44)
MCH RBC QN AUTO: 24.9 PG (ref 26.8–34.3)
MCHC RBC AUTO-ENTMCNC: 31.7 G/DL (ref 31.4–37.4)
MCV RBC AUTO: 78 FL (ref 82–98)
MONOCYTES # BLD AUTO: 0.27 THOUSAND/ΜL (ref 0.17–1.22)
MONOCYTES NFR BLD AUTO: 8 % (ref 4–12)
NEUTROPHILS # BLD AUTO: 2.62 THOUSANDS/ΜL (ref 1.85–7.62)
NEUTS SEG NFR BLD AUTO: 78 % (ref 43–75)
NRBC BLD AUTO-RTO: 0 /100 WBCS
PLATELET # BLD AUTO: 264 THOUSANDS/UL (ref 149–390)
PMV BLD AUTO: 8.5 FL (ref 8.9–12.7)
POTASSIUM SERPL-SCNC: 3.2 MMOL/L (ref 3.5–5.3)
PROT SERPL-MCNC: 7.6 G/DL (ref 6.4–8.2)
PROTHROMBIN TIME: 12.7 SECONDS (ref 11.6–14.5)
RBC # BLD AUTO: 4.5 MILLION/UL (ref 3.81–5.12)
RH BLD: POSITIVE
SODIUM SERPL-SCNC: 131 MMOL/L (ref 136–145)
SPECIMEN EXPIRATION DATE: NORMAL
TIBC SERPL-MCNC: 401 UG/DL (ref 250–450)
WBC # BLD AUTO: 3.36 THOUSAND/UL (ref 4.31–10.16)

## 2019-12-04 PROCEDURE — 83036 HEMOGLOBIN GLYCOSYLATED A1C: CPT

## 2019-12-04 PROCEDURE — 82728 ASSAY OF FERRITIN: CPT

## 2019-12-04 PROCEDURE — 86140 C-REACTIVE PROTEIN: CPT

## 2019-12-04 PROCEDURE — 86901 BLOOD TYPING SEROLOGIC RH(D): CPT

## 2019-12-04 PROCEDURE — 86923 COMPATIBILITY TEST ELECTRIC: CPT

## 2019-12-04 PROCEDURE — 85610 PROTHROMBIN TIME: CPT

## 2019-12-04 PROCEDURE — 83550 IRON BINDING TEST: CPT

## 2019-12-04 PROCEDURE — 83540 ASSAY OF IRON: CPT

## 2019-12-04 PROCEDURE — 36415 COLL VENOUS BLD VENIPUNCTURE: CPT

## 2019-12-04 PROCEDURE — 86900 BLOOD TYPING SEROLOGIC ABO: CPT

## 2019-12-04 PROCEDURE — 80053 COMPREHEN METABOLIC PANEL: CPT

## 2019-12-04 PROCEDURE — 85730 THROMBOPLASTIN TIME PARTIAL: CPT

## 2019-12-04 PROCEDURE — 85025 COMPLETE CBC W/AUTO DIFF WBC: CPT

## 2019-12-04 PROCEDURE — 86850 RBC ANTIBODY SCREEN: CPT

## 2019-12-04 NOTE — TELEPHONE ENCOUNTER
Patient contacted this morning to remind her to go for her repeat hemoglobin dropped following infusions  Patient reports she is going at 15 minutes and is aware that I will continue to monitor her chart for results

## 2019-12-04 NOTE — PROGRESS NOTES
Outreach TC to patient for initial care management assessment  Patient reports that she is feeling well  Patient reports constant pain in her R hip  Patient reports that she is independent with ADL's if given extra time and rest periods Patient does need assist with IADLs  Patient resides with her Mother  who needs assist with IADL's as well  Patient did complete her pre-op appointment for labs today  Patient has a f/u appointment scheduled with orthopedics and also has a pre-scheduled appointment with PCP for 12/23  Patient  encouraged to keep all appointments  Reviewed medications including dose, schedule, purpose & side effects of Lovenox post surgery  Patient verbalized understanding  Reviewed future appointments, s/sx to report and how/when to report symptoms to provider vs  911  Patient verbalizes understanding  Patient educated on role of Cm & contact information for CM  Agrees to additional calls  Reviewed with patient post op plan for STR  Patient is hoping for MV as patient is familiar with this facility  Patient verbalized that her Milan Klinefelter is coming to care for patient and patient's Mother   This CM asked patient if any other services were needed at this time and patient answered no

## 2019-12-07 ENCOUNTER — TELEPHONE (OUTPATIENT)
Dept: OTHER | Facility: OTHER | Age: 59
End: 2019-12-07

## 2019-12-07 NOTE — TELEPHONE ENCOUNTER
Practice Name: [address] [place] [address]    Return Phone Number: [phone] (Home)  Presenting Problem: "I have a terrible tooth ache on my  lower left side,"  Service Type: Triage  Charged Service 1: N/A  Pharmacy Name and  Number:  Nurse Assessment  Nurse: FRANCIA Giang Jame Foley Date/Time: 12/7/2019 12:30:52 PM  Type of assessment required:  ---General (Adult or Child)  Duration of Current S/S  ---1 week  Location/Radiation  ---Left mandible  Temperature (F) and route:  ---98 6 (oral)  Symptom Specific Meds (Dose/Time):  ---Tylenol (500mg)  Other S/S  ---Constant pain, worse when chewing  Last dental visit was approx 6 months ago  Denies dental caries, fracture or facial swelling/tenderness  Pain Scale on scale of 1-10, 10 being the worst:  ---2-3  Symptom progression:  ---same  Intake and Output  ---WNL  ---No  Protocols  Protocol Title Nurse Date/Time  Toothache TimeFRANCIA dunaway Jame Foley 12/7/2019 12:27:25 PM  Question Caller Affirmed  Disp  Time Disposition Final User  12/7/2019 12:36:27 PM Call Dentist when Office is Open FRANCIA Giang Jame Foley  12/7/2019 12:38:04 PM RN Triaged Yes FRANCIA Giang, [facility] Advice Given Per Protocol  CALL DENTIST WHEN OFFICE IS OPEN: You need to discuss this with your dentist within the next few days  Call him/her during  regular office hours  PAIN MEDICINES: * For pain relief, take acetaminophen, ibuprofen, or naproxen  * Use the lowest amount that  makes your pain feel better  ACETAMINOPHEN (E G , TYLENOL): * Take 650 mg (two 325 mg pills) by mouth every 4-6 hours as  needed  Each Regular Strength Tylenol pill has 325 mg of acetaminophen  The most you should take each day is 3,250 mg (10 Regular  Strength pills a day)  * Another choice is to take 1,000 mg (two 500 mg pills) every 8 hours as needed  Each Extra Strength Tylenol  pill has 500 mg of acetaminophen  The most you should take each day is 3,000 mg (6 Extra Strength pills a day)   IBUPROFEN (E G ,  MOTRIN, ADVIL): * Take 400 mg (two 200 mg pills) by mouth every 6 hours as needed  * Another choice is to take 600 mg (three  200 mg pills) by mouth every 8 hours as needed  * The most you should take each day is 1,200 mg (six 200 mg pills a day), unless your  doctor has told you to take more  CALL BACK IF: * Fever or facial swelling occurs * You become worse  CARE ADVICE given per  Toothache (Adult) guideline  Caller Understands: Yes  Caller Disagree/Comply: Comply  PreDisposition: Unsure  Comments  User: Parisa Segura RN Date/Time: 12/7/2019 12:37:47 PM  Pt instructed to call dental office and inform them of s/s and that she is scheduled for hip arthroplasty on 12/11/19  If no  assistance is provided, she should call this office back

## 2019-12-10 ENCOUNTER — ANESTHESIA EVENT (OUTPATIENT)
Dept: PERIOP | Facility: HOSPITAL | Age: 59
DRG: 470 | End: 2019-12-10
Payer: COMMERCIAL

## 2019-12-10 ENCOUNTER — TELEPHONE (OUTPATIENT)
Dept: OBGYN CLINIC | Facility: HOSPITAL | Age: 59
End: 2019-12-10

## 2019-12-10 NOTE — TELEPHONE ENCOUNTER
Patient contacted me today to discuss postoperative care potential locations  Pt advised that as I instructed in the past, that will be determined after surgery in the postoperative period  Patients mother, Anna Anderson, got on the phone and told me that she will no longer be coming to the hospital and will be staying at home, but that "Judit Bowen" who was coming to stay with her is no longer coming  Anna Monica made aware I would notify the surgeons team     When I called patient and mother back to advise them that surgeon was aware, Mother, Anna Anderson was yelling at me on the phone telling me how she needs no care support and will be fine at home "I do everything myself, I'm self sufficient " Anna Anderson was asked to allow me to talk to Tiffany, but continues to interject into the conversation  I told Tiffany they will be contacting her today with report time for tomorrow

## 2019-12-11 ENCOUNTER — HOSPITAL ENCOUNTER (OUTPATIENT)
Dept: RADIOLOGY | Facility: HOSPITAL | Age: 59
Setting detail: SURGERY ADMIT
Discharge: HOME | DRG: 470 | End: 2019-12-11
Payer: COMMERCIAL

## 2019-12-11 ENCOUNTER — ANESTHESIA (OUTPATIENT)
Dept: PERIOP | Facility: HOSPITAL | Age: 59
DRG: 470 | End: 2019-12-11
Payer: COMMERCIAL

## 2019-12-11 ENCOUNTER — HOSPITAL ENCOUNTER (INPATIENT)
Facility: HOSPITAL | Age: 59
LOS: 9 days | Discharge: NURSING, REHAB OR LONG-TERM CARE | DRG: 470 | End: 2019-12-20
Attending: ORTHOPAEDIC SURGERY | Admitting: ORTHOPAEDIC SURGERY
Payer: COMMERCIAL

## 2019-12-11 DIAGNOSIS — F41.1 GAD (GENERALIZED ANXIETY DISORDER): Chronic | ICD-10-CM

## 2019-12-11 DIAGNOSIS — Z96.641 STATUS POST RIGHT HIP REPLACEMENT: ICD-10-CM

## 2019-12-11 DIAGNOSIS — M16.11 PRIMARY OSTEOARTHRITIS OF RIGHT HIP: Primary | ICD-10-CM

## 2019-12-11 DIAGNOSIS — M16.11 PRIMARY OSTEOARTHRITIS OF RIGHT HIP: ICD-10-CM

## 2019-12-11 DIAGNOSIS — F25.1 SCHIZOAFFECTIVE DISORDER, DEPRESSIVE TYPE (HCC): Chronic | ICD-10-CM

## 2019-12-11 LAB
GLUCOSE SERPL-MCNC: 163 MG/DL (ref 65–140)
HCT VFR BLD AUTO: 26.1 % (ref 34.8–46.1)
HGB BLD-MCNC: 8.4 G/DL (ref 11.5–15.4)

## 2019-12-11 PROCEDURE — C1776 JOINT DEVICE (IMPLANTABLE): HCPCS | Performed by: ORTHOPAEDIC SURGERY

## 2019-12-11 PROCEDURE — 27130 TOTAL HIP ARTHROPLASTY: CPT | Performed by: ORTHOPAEDIC SURGERY

## 2019-12-11 PROCEDURE — 73501 X-RAY EXAM HIP UNI 1 VIEW: CPT

## 2019-12-11 PROCEDURE — C1713 ANCHOR/SCREW BN/BN,TIS/BN: HCPCS | Performed by: ORTHOPAEDIC SURGERY

## 2019-12-11 PROCEDURE — C9290 INJ, BUPIVACAINE LIPOSOME: HCPCS | Performed by: ORTHOPAEDIC SURGERY

## 2019-12-11 PROCEDURE — 99024 POSTOP FOLLOW-UP VISIT: CPT | Performed by: ORTHOPAEDIC SURGERY

## 2019-12-11 PROCEDURE — 82948 REAGENT STRIP/BLOOD GLUCOSE: CPT

## 2019-12-11 PROCEDURE — 86923 COMPATIBILITY TEST ELECTRIC: CPT

## 2019-12-11 PROCEDURE — 82947 ASSAY GLUCOSE BLOOD QUANT: CPT

## 2019-12-11 PROCEDURE — 85014 HEMATOCRIT: CPT | Performed by: PHYSICIAN ASSISTANT

## 2019-12-11 PROCEDURE — 0SR903A REPLACEMENT OF RIGHT HIP JOINT WITH CERAMIC SYNTHETIC SUBSTITUTE, UNCEMENTED, OPEN APPROACH: ICD-10-PCS | Performed by: ORTHOPAEDIC SURGERY

## 2019-12-11 PROCEDURE — 82330 ASSAY OF CALCIUM: CPT

## 2019-12-11 PROCEDURE — 85014 HEMATOCRIT: CPT

## 2019-12-11 PROCEDURE — 84295 ASSAY OF SERUM SODIUM: CPT

## 2019-12-11 PROCEDURE — 84132 ASSAY OF SERUM POTASSIUM: CPT

## 2019-12-11 PROCEDURE — 85018 HEMOGLOBIN: CPT | Performed by: PHYSICIAN ASSISTANT

## 2019-12-11 PROCEDURE — 82803 BLOOD GASES ANY COMBINATION: CPT

## 2019-12-11 DEVICE — BIOLOX DELTA CERAMIC FEMORAL HEAD 32MM DIA +5.0 12/14 TAPER
Type: IMPLANTABLE DEVICE | Site: HIP | Status: FUNCTIONAL
Brand: BIOLOX DELTA

## 2019-12-11 DEVICE — PINNACLE HIP SOLUTIONS ALTRX POLYETHYLENE ACETABULAR LINER NEUTRAL 32MM ID 48MM OD
Type: IMPLANTABLE DEVICE | Site: HIP | Status: FUNCTIONAL
Brand: PINNACLE ALTRX

## 2019-12-11 DEVICE — PINNACLE POROCOAT ACETABULAR SHELL SECTOR II 48MM OD
Type: IMPLANTABLE DEVICE | Site: HIP | Status: FUNCTIONAL
Brand: PINNACLE POROCOAT

## 2019-12-11 DEVICE — PINNACLE CANCELLOUS BONE SCREW 6.5MM X 25MM
Type: IMPLANTABLE DEVICE | Status: FUNCTIONAL
Brand: PINNACLE

## 2019-12-11 RX ORDER — PROPOFOL 10 MG/ML
INJECTION, EMULSION INTRAVENOUS AS NEEDED
Status: DISCONTINUED | OUTPATIENT
Start: 2019-12-11 | End: 2019-12-11 | Stop reason: SURG

## 2019-12-11 RX ORDER — OXYCODONE HYDROCHLORIDE 5 MG/1
5 TABLET ORAL EVERY 4 HOURS PRN
Status: DISCONTINUED | OUTPATIENT
Start: 2019-12-11 | End: 2019-12-20 | Stop reason: HOSPADM

## 2019-12-11 RX ORDER — ACETAMINOPHEN 325 MG/1
650 TABLET ORAL EVERY 6 HOURS PRN
Status: DISCONTINUED | OUTPATIENT
Start: 2019-12-11 | End: 2019-12-20 | Stop reason: HOSPADM

## 2019-12-11 RX ORDER — OXYCODONE HYDROCHLORIDE 10 MG/1
10 TABLET ORAL EVERY 4 HOURS PRN
Status: DISCONTINUED | OUTPATIENT
Start: 2019-12-11 | End: 2019-12-20 | Stop reason: HOSPADM

## 2019-12-11 RX ORDER — LEVOTHYROXINE SODIUM 0.03 MG/1
25 TABLET ORAL DAILY
Status: DISCONTINUED | OUTPATIENT
Start: 2019-12-12 | End: 2019-12-20 | Stop reason: HOSPADM

## 2019-12-11 RX ORDER — GABAPENTIN 300 MG/1
300 CAPSULE ORAL ONCE
Status: COMPLETED | OUTPATIENT
Start: 2019-12-11 | End: 2019-12-11

## 2019-12-11 RX ORDER — LIDOCAINE HYDROCHLORIDE 10 MG/ML
0.5 INJECTION, SOLUTION EPIDURAL; INFILTRATION; INTRACAUDAL; PERINEURAL ONCE AS NEEDED
Status: COMPLETED | OUTPATIENT
Start: 2019-12-11 | End: 2019-12-11

## 2019-12-11 RX ORDER — MIDAZOLAM HYDROCHLORIDE 2 MG/2ML
INJECTION, SOLUTION INTRAMUSCULAR; INTRAVENOUS AS NEEDED
Status: DISCONTINUED | OUTPATIENT
Start: 2019-12-11 | End: 2019-12-11 | Stop reason: SURG

## 2019-12-11 RX ORDER — FENTANYL CITRATE/PF 50 MCG/ML
25 SYRINGE (ML) INJECTION
Status: COMPLETED | OUTPATIENT
Start: 2019-12-11 | End: 2019-12-11

## 2019-12-11 RX ORDER — ALBUMIN, HUMAN INJ 5% 5 %
SOLUTION INTRAVENOUS CONTINUOUS PRN
Status: DISCONTINUED | OUTPATIENT
Start: 2019-12-11 | End: 2019-12-11 | Stop reason: SURG

## 2019-12-11 RX ORDER — EPHEDRINE SULFATE 50 MG/ML
INJECTION INTRAVENOUS AS NEEDED
Status: DISCONTINUED | OUTPATIENT
Start: 2019-12-11 | End: 2019-12-11 | Stop reason: SURG

## 2019-12-11 RX ORDER — CHLORHEXIDINE GLUCONATE 0.12 MG/ML
15 RINSE ORAL ONCE
Status: COMPLETED | OUTPATIENT
Start: 2019-12-11 | End: 2019-12-11

## 2019-12-11 RX ORDER — CEFAZOLIN SODIUM 2 G/50ML
SOLUTION INTRAVENOUS AS NEEDED
Status: DISCONTINUED | OUTPATIENT
Start: 2019-12-11 | End: 2019-12-11 | Stop reason: SURG

## 2019-12-11 RX ORDER — SODIUM CHLORIDE, SODIUM LACTATE, POTASSIUM CHLORIDE, CALCIUM CHLORIDE 600; 310; 30; 20 MG/100ML; MG/100ML; MG/100ML; MG/100ML
125 INJECTION, SOLUTION INTRAVENOUS CONTINUOUS
Status: DISCONTINUED | OUTPATIENT
Start: 2019-12-11 | End: 2019-12-11

## 2019-12-11 RX ORDER — MAGNESIUM HYDROXIDE/ALUMINUM HYDROXICE/SIMETHICONE 120; 1200; 1200 MG/30ML; MG/30ML; MG/30ML
30 SUSPENSION ORAL EVERY 6 HOURS PRN
Status: DISCONTINUED | OUTPATIENT
Start: 2019-12-11 | End: 2019-12-20 | Stop reason: HOSPADM

## 2019-12-11 RX ORDER — LORAZEPAM 1 MG/1
2 TABLET ORAL EVERY 8 HOURS PRN
Status: DISCONTINUED | OUTPATIENT
Start: 2019-12-11 | End: 2019-12-20 | Stop reason: HOSPADM

## 2019-12-11 RX ORDER — PANTOPRAZOLE SODIUM 40 MG/1
40 TABLET, DELAYED RELEASE ORAL DAILY
Status: DISCONTINUED | OUTPATIENT
Start: 2019-12-12 | End: 2019-12-20 | Stop reason: HOSPADM

## 2019-12-11 RX ORDER — GLYCOPYRROLATE 0.2 MG/ML
INJECTION INTRAMUSCULAR; INTRAVENOUS AS NEEDED
Status: DISCONTINUED | OUTPATIENT
Start: 2019-12-11 | End: 2019-12-11 | Stop reason: SURG

## 2019-12-11 RX ORDER — CEFAZOLIN SODIUM 2 G/50ML
2000 SOLUTION INTRAVENOUS ONCE
Status: DISCONTINUED | OUTPATIENT
Start: 2019-12-11 | End: 2019-12-11

## 2019-12-11 RX ORDER — PAROXETINE HYDROCHLORIDE 20 MG/1
60 TABLET, FILM COATED ORAL EVERY MORNING
Status: DISCONTINUED | OUTPATIENT
Start: 2019-12-12 | End: 2019-12-20 | Stop reason: HOSPADM

## 2019-12-11 RX ORDER — ZIPRASIDONE HYDROCHLORIDE 40 MG/1
80 CAPSULE ORAL 2 TIMES DAILY
Status: DISCONTINUED | OUTPATIENT
Start: 2019-12-11 | End: 2019-12-20 | Stop reason: HOSPADM

## 2019-12-11 RX ORDER — SODIUM CHLORIDE 9 MG/ML
INJECTION, SOLUTION INTRAVENOUS CONTINUOUS PRN
Status: DISCONTINUED | OUTPATIENT
Start: 2019-12-11 | End: 2019-12-11 | Stop reason: SURG

## 2019-12-11 RX ORDER — SODIUM CHLORIDE, SODIUM LACTATE, POTASSIUM CHLORIDE, CALCIUM CHLORIDE 600; 310; 30; 20 MG/100ML; MG/100ML; MG/100ML; MG/100ML
1.5 INJECTION, SOLUTION INTRAVENOUS CONTINUOUS
Status: DISCONTINUED | OUTPATIENT
Start: 2019-12-11 | End: 2019-12-20 | Stop reason: HOSPADM

## 2019-12-11 RX ORDER — NEOSTIGMINE METHYLSULFATE 1 MG/ML
INJECTION INTRAVENOUS AS NEEDED
Status: DISCONTINUED | OUTPATIENT
Start: 2019-12-11 | End: 2019-12-11 | Stop reason: SURG

## 2019-12-11 RX ORDER — METHOCARBAMOL 750 MG/1
750 TABLET, FILM COATED ORAL EVERY 6 HOURS SCHEDULED
Status: DISCONTINUED | OUTPATIENT
Start: 2019-12-12 | End: 2019-12-20 | Stop reason: HOSPADM

## 2019-12-11 RX ORDER — CHLORHEXIDINE GLUCONATE 4 G/100ML
SOLUTION TOPICAL DAILY PRN
Status: DISCONTINUED | OUTPATIENT
Start: 2019-12-11 | End: 2019-12-11

## 2019-12-11 RX ORDER — ACETAMINOPHEN 325 MG/1
975 TABLET ORAL ONCE
Status: COMPLETED | OUTPATIENT
Start: 2019-12-11 | End: 2019-12-11

## 2019-12-11 RX ORDER — ONDANSETRON 2 MG/ML
4 INJECTION INTRAMUSCULAR; INTRAVENOUS EVERY 6 HOURS PRN
Status: DISCONTINUED | OUTPATIENT
Start: 2019-12-11 | End: 2019-12-20 | Stop reason: HOSPADM

## 2019-12-11 RX ORDER — FENTANYL CITRATE 50 UG/ML
INJECTION, SOLUTION INTRAMUSCULAR; INTRAVENOUS AS NEEDED
Status: DISCONTINUED | OUTPATIENT
Start: 2019-12-11 | End: 2019-12-11 | Stop reason: SURG

## 2019-12-11 RX ORDER — DOCUSATE SODIUM 100 MG/1
100 CAPSULE, LIQUID FILLED ORAL 2 TIMES DAILY
Status: DISCONTINUED | OUTPATIENT
Start: 2019-12-12 | End: 2019-12-20 | Stop reason: HOSPADM

## 2019-12-11 RX ORDER — SODIUM CHLORIDE 9 MG/ML
INJECTION INTRAVENOUS AS NEEDED
Status: DISCONTINUED | OUTPATIENT
Start: 2019-12-11 | End: 2019-12-11 | Stop reason: HOSPADM

## 2019-12-11 RX ORDER — BUPIVACAINE HYDROCHLORIDE 2.5 MG/ML
INJECTION, SOLUTION INFILTRATION; PERINEURAL AS NEEDED
Status: DISCONTINUED | OUTPATIENT
Start: 2019-12-11 | End: 2019-12-11 | Stop reason: HOSPADM

## 2019-12-11 RX ORDER — CEFAZOLIN SODIUM 1 G/50ML
1000 SOLUTION INTRAVENOUS EVERY 8 HOURS
Status: COMPLETED | OUTPATIENT
Start: 2019-12-12 | End: 2019-12-12

## 2019-12-11 RX ORDER — CALCIUM CARBONATE 200(500)MG
1000 TABLET,CHEWABLE ORAL DAILY PRN
Status: DISCONTINUED | OUTPATIENT
Start: 2019-12-11 | End: 2019-12-20 | Stop reason: HOSPADM

## 2019-12-11 RX ORDER — GABAPENTIN 100 MG/1
100 CAPSULE ORAL EVERY 8 HOURS
Status: DISCONTINUED | OUTPATIENT
Start: 2019-12-11 | End: 2019-12-20 | Stop reason: HOSPADM

## 2019-12-11 RX ORDER — ROCURONIUM BROMIDE 10 MG/ML
INJECTION, SOLUTION INTRAVENOUS AS NEEDED
Status: DISCONTINUED | OUTPATIENT
Start: 2019-12-11 | End: 2019-12-11 | Stop reason: SURG

## 2019-12-11 RX ADMIN — FENTANYL CITRATE 25 MCG: 50 INJECTION, SOLUTION INTRAMUSCULAR; INTRAVENOUS at 21:32

## 2019-12-11 RX ADMIN — CHLORHEXIDINE GLUCONATE 0.12% ORAL RINSE 15 ML: 1.2 LIQUID ORAL at 12:56

## 2019-12-11 RX ADMIN — PHENYLEPHRINE HYDROCHLORIDE 40 MCG/MIN: 10 INJECTION INTRAVENOUS at 17:23

## 2019-12-11 RX ADMIN — GABAPENTIN 100 MG: 100 CAPSULE ORAL at 23:35

## 2019-12-11 RX ADMIN — MIDAZOLAM 2 MG: 1 INJECTION INTRAMUSCULAR; INTRAVENOUS at 17:12

## 2019-12-11 RX ADMIN — CEFAZOLIN SODIUM 2000 MG: 2 SOLUTION INTRAVENOUS at 17:27

## 2019-12-11 RX ADMIN — SODIUM CHLORIDE, SODIUM LACTATE, POTASSIUM CHLORIDE, AND CALCIUM CHLORIDE: .6; .31; .03; .02 INJECTION, SOLUTION INTRAVENOUS at 17:11

## 2019-12-11 RX ADMIN — ALBUMIN (HUMAN): 12.5 SOLUTION INTRAVENOUS at 19:07

## 2019-12-11 RX ADMIN — ALBUMIN (HUMAN): 12.5 SOLUTION INTRAVENOUS at 18:00

## 2019-12-11 RX ADMIN — NEOSTIGMINE METHYLSULFATE 3 MG: 1 INJECTION INTRAVENOUS at 20:54

## 2019-12-11 RX ADMIN — GLYCOPYRROLATE 0.4 MG: 0.2 INJECTION, SOLUTION INTRAMUSCULAR; INTRAVENOUS at 20:54

## 2019-12-11 RX ADMIN — FENTANYL CITRATE 25 MCG: 50 INJECTION, SOLUTION INTRAMUSCULAR; INTRAVENOUS at 21:27

## 2019-12-11 RX ADMIN — FENTANYL CITRATE 25 MCG: 50 INJECTION, SOLUTION INTRAMUSCULAR; INTRAVENOUS at 21:42

## 2019-12-11 RX ADMIN — FENTANYL CITRATE 50 MCG: 50 INJECTION, SOLUTION INTRAMUSCULAR; INTRAVENOUS at 18:36

## 2019-12-11 RX ADMIN — PHENYLEPHRINE HYDROCHLORIDE 200 MCG: 10 INJECTION INTRAVENOUS at 19:34

## 2019-12-11 RX ADMIN — ROCURONIUM BROMIDE 30 MG: 50 INJECTION, SOLUTION INTRAVENOUS at 17:22

## 2019-12-11 RX ADMIN — FENTANYL CITRATE 50 MCG: 50 INJECTION, SOLUTION INTRAMUSCULAR; INTRAVENOUS at 18:35

## 2019-12-11 RX ADMIN — PROPOFOL 150 MG: 10 INJECTION, EMULSION INTRAVENOUS at 17:22

## 2019-12-11 RX ADMIN — PHENYLEPHRINE HYDROCHLORIDE 100 MCG: 10 INJECTION INTRAVENOUS at 17:22

## 2019-12-11 RX ADMIN — SODIUM CHLORIDE: 0.9 INJECTION, SOLUTION INTRAVENOUS at 19:39

## 2019-12-11 RX ADMIN — FENTANYL CITRATE 50 MCG: 50 INJECTION, SOLUTION INTRAMUSCULAR; INTRAVENOUS at 19:44

## 2019-12-11 RX ADMIN — SODIUM CHLORIDE, SODIUM LACTATE, POTASSIUM CHLORIDE, AND CALCIUM CHLORIDE 1.5 ML/KG/HR: .6; .31; .03; .02 INJECTION, SOLUTION INTRAVENOUS at 21:50

## 2019-12-11 RX ADMIN — ACETAMINOPHEN 975 MG: 325 TABLET ORAL at 12:55

## 2019-12-11 RX ADMIN — LIDOCAINE HYDROCHLORIDE 0.5 ML: 10 INJECTION, SOLUTION EPIDURAL; INFILTRATION; INTRACAUDAL; PERINEURAL at 13:35

## 2019-12-11 RX ADMIN — FENTANYL CITRATE 100 MCG: 50 INJECTION, SOLUTION INTRAMUSCULAR; INTRAVENOUS at 17:15

## 2019-12-11 RX ADMIN — GABAPENTIN 300 MG: 300 CAPSULE ORAL at 12:55

## 2019-12-11 RX ADMIN — ENOXAPARIN SODIUM 40 MG: 40 INJECTION SUBCUTANEOUS at 23:35

## 2019-12-11 RX ADMIN — QUETIAPINE FUMARATE 400 MG: 100 TABLET ORAL at 23:35

## 2019-12-11 RX ADMIN — ROCURONIUM BROMIDE 20 MG: 50 INJECTION, SOLUTION INTRAVENOUS at 19:20

## 2019-12-11 RX ADMIN — SODIUM CHLORIDE, SODIUM LACTATE, POTASSIUM CHLORIDE, AND CALCIUM CHLORIDE 125 ML/HR: .6; .31; .03; .02 INJECTION, SOLUTION INTRAVENOUS at 13:35

## 2019-12-11 RX ADMIN — FENTANYL CITRATE 25 MCG: 50 INJECTION, SOLUTION INTRAMUSCULAR; INTRAVENOUS at 21:37

## 2019-12-11 RX ADMIN — METHOCARBAMOL TABLETS 750 MG: 750 TABLET, COATED ORAL at 23:35

## 2019-12-11 RX ADMIN — OXYCODONE HYDROCHLORIDE 10 MG: 10 TABLET ORAL at 23:35

## 2019-12-11 RX ADMIN — EPHEDRINE SULFATE 10 MG: 50 INJECTION, SOLUTION INTRAVENOUS at 17:57

## 2019-12-11 RX ADMIN — TRANEXAMIC ACID 1000 MG: 1 INJECTION, SOLUTION INTRAVENOUS at 17:27

## 2019-12-11 RX ADMIN — EPHEDRINE SULFATE 5 MG: 50 INJECTION, SOLUTION INTRAVENOUS at 17:59

## 2019-12-11 RX ADMIN — ROCURONIUM BROMIDE 20 MG: 50 INJECTION, SOLUTION INTRAVENOUS at 17:45

## 2019-12-11 RX ADMIN — SODIUM CHLORIDE: 0.9 INJECTION, SOLUTION INTRAVENOUS at 17:23

## 2019-12-11 RX ADMIN — FENTANYL CITRATE 50 MCG: 50 INJECTION, SOLUTION INTRAMUSCULAR; INTRAVENOUS at 19:38

## 2019-12-11 RX ADMIN — PHENYLEPHRINE HYDROCHLORIDE 200 MCG: 10 INJECTION INTRAVENOUS at 20:17

## 2019-12-11 NOTE — ANESTHESIA PREPROCEDURE EVALUATION
Review of Systems/Medical History  Patient summary reviewed  Chart reviewed  No history of anesthetic complications     Cardiovascular  Hyperlipidemia, Hypertension ,    Pulmonary  Negative pulmonary ROS        GI/Hepatic    GERD ,        Negative  ROS        Endo/Other  History of thyroid disease ,   Comment: Serotonin syndrome       GYN  Negative gynecology ROS          Hematology  Anemia ,     Musculoskeletal  Rheumatoid arthritis ,   Arthritis     Neurology  Seizures (Reports history of one seziure episode after taking a particular medication- unable to state which drug) ,     Psychology   Psychiatric history (Schizoaffective disorder), Anxiety (takes ativan), Depression ,              Physical Exam    Airway    Mallampati score: III  TM Distance: >3 FB  Neck ROM: full     Dental       Cardiovascular  Cardiovascular exam normal    Pulmonary  Pulmonary exam normal     Other Findings        Anesthesia Plan  ASA Score- 3     Anesthesia Type- general with ASA Monitors  Additional Monitors:   Airway Plan: ETT  Comment: General anesthesia, endotracheal tube; standard ASA monitors  Risks and benefits discussed with patient; patient consented and agrees to proceed  I saw and evaluated the patient  If seen with CRNA, we have discussed the anesthetic plan and I am in agreement that the plan is appropriate for the patient  Pt says she is not aware of having serotonin syndrome  Plan Factors-    Induction- intravenous  Postoperative Plan- Plan for postoperative opioid use  Planned trial extubation    Informed Consent- Anesthetic plan and risks discussed with patient  I personally reviewed this patient with the CRNA  Discussed and agreed on the Anesthesia Plan with the CRNA  Ibis Manrique

## 2019-12-11 NOTE — OR NURSING
I called and spoke with patient's mother Jovanni Simpson) who seemed vary confused and concerned regarding the surgical case starting late  I explained to her the late-start and informed her of patient's status and progress of case

## 2019-12-12 LAB
ANION GAP SERPL CALCULATED.3IONS-SCNC: 5 MMOL/L (ref 4–13)
BASE EXCESS BLDA CALC-SCNC: 2 MMOL/L (ref -2–3)
BASOPHILS # BLD AUTO: 0.02 THOUSANDS/ΜL (ref 0–0.1)
BASOPHILS NFR BLD AUTO: 0 % (ref 0–1)
BUN SERPL-MCNC: 4 MG/DL (ref 5–25)
CA-I BLD-SCNC: 1.16 MMOL/L (ref 1.12–1.32)
CALCIUM SERPL-MCNC: 7.7 MG/DL (ref 8.3–10.1)
CHLORIDE SERPL-SCNC: 101 MMOL/L (ref 100–108)
CO2 SERPL-SCNC: 30 MMOL/L (ref 21–32)
CREAT SERPL-MCNC: 0.64 MG/DL (ref 0.6–1.3)
EOSINOPHIL # BLD AUTO: 0 THOUSAND/ΜL (ref 0–0.61)
EOSINOPHIL NFR BLD AUTO: 0 % (ref 0–6)
ERYTHROCYTE [DISTWIDTH] IN BLOOD BY AUTOMATED COUNT: 23.1 % (ref 11.6–15.1)
ERYTHROCYTE [DISTWIDTH] IN BLOOD BY AUTOMATED COUNT: 24.7 % (ref 11.6–15.1)
GFR SERPL CREATININE-BSD FRML MDRD: 98 ML/MIN/1.73SQ M
GLUCOSE SERPL-MCNC: 127 MG/DL (ref 65–140)
GLUCOSE SERPL-MCNC: 155 MG/DL (ref 65–140)
HCO3 BLDA-SCNC: 27.2 MMOL/L (ref 24–30)
HCT VFR BLD AUTO: 20.4 % (ref 34.8–46.1)
HCT VFR BLD AUTO: 24.2 % (ref 34.8–46.1)
HCT VFR BLD AUTO: 28.3 % (ref 34.8–46.1)
HCT VFR BLD CALC: 27 % (ref 34.8–46.1)
HGB BLD-MCNC: 6.4 G/DL (ref 11.5–15.4)
HGB BLD-MCNC: 7.8 G/DL (ref 11.5–15.4)
HGB BLD-MCNC: 9.5 G/DL (ref 11.5–15.4)
HGB BLDA-MCNC: 9.2 G/DL (ref 11.5–15.4)
IMM GRANULOCYTES # BLD AUTO: 0.05 THOUSAND/UL (ref 0–0.2)
IMM GRANULOCYTES NFR BLD AUTO: 1 % (ref 0–2)
LYMPHOCYTES # BLD AUTO: 0.33 THOUSANDS/ΜL (ref 0.6–4.47)
LYMPHOCYTES NFR BLD AUTO: 6 % (ref 14–44)
MCH RBC QN AUTO: 25.5 PG (ref 26.8–34.3)
MCH RBC QN AUTO: 26.7 PG (ref 26.8–34.3)
MCHC RBC AUTO-ENTMCNC: 31.4 G/DL (ref 31.4–37.4)
MCHC RBC AUTO-ENTMCNC: 32.2 G/DL (ref 31.4–37.4)
MCV RBC AUTO: 81 FL (ref 82–98)
MCV RBC AUTO: 83 FL (ref 82–98)
MONOCYTES # BLD AUTO: 0.55 THOUSAND/ΜL (ref 0.17–1.22)
MONOCYTES NFR BLD AUTO: 11 % (ref 4–12)
NEUTROPHILS # BLD AUTO: 4.25 THOUSANDS/ΜL (ref 1.85–7.62)
NEUTS SEG NFR BLD AUTO: 82 % (ref 43–75)
NRBC BLD AUTO-RTO: 0 /100 WBCS
PCO2 BLD: 28 MMOL/L (ref 21–32)
PCO2 BLD: 42 MM HG (ref 42–50)
PH BLD: 7.42 [PH] (ref 7.3–7.4)
PLATELET # BLD AUTO: 151 THOUSANDS/UL (ref 149–390)
PLATELET # BLD AUTO: 156 THOUSANDS/UL (ref 149–390)
PMV BLD AUTO: 8.2 FL (ref 8.9–12.7)
PMV BLD AUTO: 8.5 FL (ref 8.9–12.7)
PO2 BLD: 89 MM HG (ref 35–45)
POTASSIUM BLD-SCNC: 3.2 MMOL/L (ref 3.5–5.3)
POTASSIUM SERPL-SCNC: 3.5 MMOL/L (ref 3.5–5.3)
RBC # BLD AUTO: 2.51 MILLION/UL (ref 3.81–5.12)
RBC # BLD AUTO: 2.92 MILLION/UL (ref 3.81–5.12)
SAO2 % BLD FROM PO2: 97 % (ref 60–85)
SODIUM BLD-SCNC: 131 MMOL/L (ref 136–145)
SODIUM SERPL-SCNC: 136 MMOL/L (ref 136–145)
SPECIMEN SOURCE: ABNORMAL
WBC # BLD AUTO: 4.33 THOUSAND/UL (ref 4.31–10.16)
WBC # BLD AUTO: 5.2 THOUSAND/UL (ref 4.31–10.16)

## 2019-12-12 PROCEDURE — 30233N1 TRANSFUSION OF NONAUTOLOGOUS RED BLOOD CELLS INTO PERIPHERAL VEIN, PERCUTANEOUS APPROACH: ICD-10-PCS | Performed by: ORTHOPAEDIC SURGERY

## 2019-12-12 PROCEDURE — 85025 COMPLETE CBC W/AUTO DIFF WBC: CPT | Performed by: NURSE PRACTITIONER

## 2019-12-12 PROCEDURE — 97167 OT EVAL HIGH COMPLEX 60 MIN: CPT

## 2019-12-12 PROCEDURE — G8979 MOBILITY GOAL STATUS: HCPCS

## 2019-12-12 PROCEDURE — 99024 POSTOP FOLLOW-UP VISIT: CPT | Performed by: PHYSICIAN ASSISTANT

## 2019-12-12 PROCEDURE — G8987 SELF CARE CURRENT STATUS: HCPCS

## 2019-12-12 PROCEDURE — G8988 SELF CARE GOAL STATUS: HCPCS

## 2019-12-12 PROCEDURE — 85027 COMPLETE CBC AUTOMATED: CPT | Performed by: PHYSICIAN ASSISTANT

## 2019-12-12 PROCEDURE — P9016 RBC LEUKOCYTES REDUCED: HCPCS

## 2019-12-12 PROCEDURE — 85014 HEMATOCRIT: CPT | Performed by: ORTHOPAEDIC SURGERY

## 2019-12-12 PROCEDURE — 80048 BASIC METABOLIC PNL TOTAL CA: CPT | Performed by: PHYSICIAN ASSISTANT

## 2019-12-12 PROCEDURE — G8978 MOBILITY CURRENT STATUS: HCPCS

## 2019-12-12 PROCEDURE — 85018 HEMOGLOBIN: CPT | Performed by: ORTHOPAEDIC SURGERY

## 2019-12-12 PROCEDURE — 97163 PT EVAL HIGH COMPLEX 45 MIN: CPT

## 2019-12-12 RX ADMIN — IRON SUCROSE 300 MG: 20 INJECTION, SOLUTION INTRAVENOUS at 11:14

## 2019-12-12 RX ADMIN — DOCUSATE SODIUM 100 MG: 100 CAPSULE, LIQUID FILLED ORAL at 17:31

## 2019-12-12 RX ADMIN — QUETIAPINE FUMARATE 400 MG: 100 TABLET ORAL at 22:57

## 2019-12-12 RX ADMIN — SODIUM CHLORIDE, SODIUM LACTATE, POTASSIUM CHLORIDE, AND CALCIUM CHLORIDE 1000 ML: .6; .31; .03; .02 INJECTION, SOLUTION INTRAVENOUS at 00:41

## 2019-12-12 RX ADMIN — OXYCODONE HYDROCHLORIDE 10 MG: 10 TABLET ORAL at 11:14

## 2019-12-12 RX ADMIN — OXYCODONE HYDROCHLORIDE 10 MG: 10 TABLET ORAL at 15:19

## 2019-12-12 RX ADMIN — GABAPENTIN 100 MG: 100 CAPSULE ORAL at 15:19

## 2019-12-12 RX ADMIN — DOCUSATE SODIUM 100 MG: 100 CAPSULE, LIQUID FILLED ORAL at 09:14

## 2019-12-12 RX ADMIN — ENOXAPARIN SODIUM 40 MG: 40 INJECTION SUBCUTANEOUS at 22:58

## 2019-12-12 RX ADMIN — LEVOTHYROXINE SODIUM 25 MCG: 25 TABLET ORAL at 05:10

## 2019-12-12 RX ADMIN — OXYCODONE HYDROCHLORIDE 10 MG: 10 TABLET ORAL at 20:51

## 2019-12-12 RX ADMIN — PAROXETINE 60 MG: 20 TABLET, FILM COATED ORAL at 09:14

## 2019-12-12 RX ADMIN — GABAPENTIN 100 MG: 100 CAPSULE ORAL at 22:57

## 2019-12-12 RX ADMIN — ZIPRASIDONE HCL 80 MG: 40 CAPSULE ORAL at 17:31

## 2019-12-12 RX ADMIN — GABAPENTIN 100 MG: 100 CAPSULE ORAL at 06:17

## 2019-12-12 RX ADMIN — PANTOPRAZOLE SODIUM 40 MG: 40 TABLET, DELAYED RELEASE ORAL at 09:14

## 2019-12-12 RX ADMIN — CEFAZOLIN SODIUM 1000 MG: 1 SOLUTION INTRAVENOUS at 10:24

## 2019-12-12 RX ADMIN — CEFAZOLIN SODIUM 1000 MG: 1 SOLUTION INTRAVENOUS at 03:50

## 2019-12-12 RX ADMIN — LORAZEPAM 2 MG: 1 TABLET ORAL at 10:02

## 2019-12-12 RX ADMIN — ZIPRASIDONE HCL 80 MG: 40 CAPSULE ORAL at 09:14

## 2019-12-12 RX ADMIN — METHOCARBAMOL TABLETS 750 MG: 750 TABLET, COATED ORAL at 05:11

## 2019-12-12 RX ADMIN — METHOCARBAMOL TABLETS 750 MG: 750 TABLET, COATED ORAL at 11:14

## 2019-12-12 RX ADMIN — SODIUM CHLORIDE, SODIUM LACTATE, POTASSIUM CHLORIDE, AND CALCIUM CHLORIDE 1000 ML: .6; .31; .03; .02 INJECTION, SOLUTION INTRAVENOUS at 02:43

## 2019-12-12 RX ADMIN — METHOCARBAMOL TABLETS 750 MG: 750 TABLET, COATED ORAL at 17:31

## 2019-12-12 RX ADMIN — METHOCARBAMOL TABLETS 750 MG: 750 TABLET, COATED ORAL at 22:57

## 2019-12-12 NOTE — OCCUPATIONAL THERAPY NOTE
Occupational Therapy Cancellation Note  OT orders received, pt's chart reviewed  Pt with blood loss anemia and most recent hemoglobin was 6 4  OT to hold off on initial evaluation until clinically appropriate      DEVYN Raygoza, OTR/L

## 2019-12-12 NOTE — PLAN OF CARE
Problem: PHYSICAL THERAPY ADULT  Goal: Performs mobility at highest level of function for planned discharge setting  See evaluation for individualized goals  Note:   Prognosis: Good  Problem List: Decreased strength, Impaired balance, Decreased endurance, Decreased mobility, Pain  Assessment: Pt is a 60 yo female admitted to [place] [address] on 2019 s/p R anterior total hip arthoplasty  DX: ELIZABETH, post op blood loss anemia, hypotension, hypothyroid, schizophrenia disorder  Two patient identifiers were used to confirm  Pt lives in a [facility] with 3 FAMILIA with her mother  Pt is her mothers caregiver  Pt was I for ADL's and mobility prior and did not use an AD  Pt did use an w/c for longer distances when going to her doctors appointments  Pt worked part time as a professor  Pt's impairments include reduced mobility, limited endurance, high fall risk, increased pain in R hip, limited support at home  These impairments limit the ability of the patient to perform mobility without increased assistance, return to PLOF and participate in everyday life activities  Pt would benefit from continued skilled therapy while in the hospital to improve overall mobility and work towards being I  Recommend discharge to rehab vs home PT  At the end of the session the patient was left in seated position with call bell and phone within reach  BP sittin/78mmHg  Barriers to Discharge: Decreased caregiver support, Inaccessible home environment     Recommendation: Other (Comment)(rehab vs home PT)     PT - OK to Discharge: No    See flowsheet documentation for full assessment

## 2019-12-12 NOTE — UTILIZATION REVIEW
Initial Clinical Review    Elective Inpatient surgical procedure  Age/Sex: 61 y o  female  Surgery Date: 12/11  Procedure: S/P S/P Right Anterior Total Hip Arthroplasty  Anesthesia: General    Admission Orders: Date/Time/Statement: Inpatient Admission Orders (From admission, onward)     Ordered        12/11/19 2032  Inpatient Admission  Once                   Orders Placed This Encounter   Procedures    Inpatient Admission     Standing Status:   Standing     Number of Occurrences:   1     Order Specific Question:   Admitting Physician     Answer:   Neela Stewart [[id number]]     Order Specific Question:   Level of Care     Answer:   Med Surg [[id number]]     Order Specific Question:   Estimated length of stay     Answer:   More than 2 Midnights     Order Specific Question:   Certification     Answer:   I certify that inpatient services are medically necessary for this patient for a duration of greater than two midnights  See H&P and MD Progress Notes for additional information about the patient's course of treatment       Vital Signs: BP (!) 86/49   Pulse (!) 108   Temp 99 °F (37 2 °C) (Oral)   Resp 16   Ht 5' 3 5" (1 613 m)   Wt 72 6 kg (160 lb)   SpO2 98%   BMI 27 90 kg/m²      Diet: Regular  Mobility: Activity as tolerted  DVT Prophylaxis: Sequential compression device    Medications/Pain Control:   Scheduled Medications:  Medications:  cefazolin 1,000 mg Intravenous Q8H   docusate sodium 100 mg Oral BID   enoxaparin 40 mg Subcutaneous Daily   gabapentin 100 mg Oral Q8H   iron sucrose 300 mg Intravenous Daily   levothyroxine 25 mcg Oral Daily   methocarbamol 750 mg Oral Q6H YUMIKO   pantoprazole 40 mg Oral Daily   PARoxetine 60 mg Oral QAM   QUEtiapine 400 mg Oral HS   ziprasidone 80 mg Oral BID     Continuous IV Infusions:  lactated ringers 1 5 mL/kg/hr Intravenous Continuous     PRN Meds:  acetaminophen 650 mg Oral Q6H PRN   aluminum-magnesium hydroxide-simethicone 30 mL Oral Q6H PRN   calcium carbonate 1,000 mg Oral Daily PRN   LORazepam 2 mg Oral Q8H PRN   morphine injection 2 mg Intravenous Q2H PRN   ondansetron 4 mg Intravenous Q6H PRN   oxyCODONE 10 mg Oral Q4H PRN 12/11 x1   oxyCODONE 5 mg Oral Q4H PRN     Network Utilization Review Department  [email or web address]  ATTENTION: Please call with any questions or concerns to [phone] and carefully listen to the prompts so that you are directed to the right person  All voicemails are confidential   Finis Feeling all requests for admission clinical reviews, approved or denied determinations and any other requests to dedicated fax number below belonging to the campus where the patient is receiving treatment   List of dedicated fax numbers for the Facilities:  [address] DENIALS (Administrative/Medical Necessity) [phone]   [address] (Maternity/NICU/Pediatrics) [phone]   Jean Burns [phone]   True Constant [phone]   Jitendra Standing [phone]   [facility] [address] [facility] [phone]   Sherrie Peers [phone]   Sravani Voss [address] [facility] [place] [address] [phone]

## 2019-12-12 NOTE — CONSULTS
Internal Medicine Consultation Note    Patient: Alejandro August  Age/sex: 61 y o  female  Medical Record #: [id number]    Assessment/Plan    Status Post right Total HIP ARTHROPLASTY   Continue post op pain control measures as prescribed   Follow bowel regimen to help decrease narcotic induced constipation    Follow post operative hemoglobin with serial CBC and treat accordingly   Monitor WBC and fever curve post op while encouraging use of incentive spirometer   DVT prophylaxis in place and reviewed  Post operative blood loss anemia  · hgb 6 4 this a m  · Being transfused this a m  With 1 u prbc  · Repeat cbc post infusion and in a m  Hypotension  · Cont IVF/blood transfusion    Hypothyroid  · Cont supplementation    Schizophrenia disorder  · Cont current medications uninterrupted  · Mood appears stable        Subjective/ HPI: Patient seen and examined  Pt is a 60 y/o female who has a h/o severe degenerative arthritis of the right hip that failed outpatient conservative measures  She elected to undergo elective replacement yesterday by Dr Ra Vigil  She underwent the procedure without any difficulty however overnight she had some issues with hypotension, lightheadedness and acute blood loss anemia with a hgb this a m  Of 6 4  She is currently being transfused and is starting to feel better from a dizziness standpoint  Has no appetite this a m     ROS:   A 10 point ROS was performed; negative except as noted above       Social History:    Substance Use History:   Social History     Substance and Sexual Activity   Alcohol Use Never    Frequency: Never     Social History     Tobacco Use   Smoking Status Never Smoker   Smokeless Tobacco Never Used     Social History     Substance and Sexual Activity   Drug Use No       Family History:    Family History   Problem Relation Age of Onset    Uterine cancer Mother     Esophageal cancer Father     Colon cancer Maternal Grandmother          Review of Scheduled Meds:    Current Facility-Administered Medications:  acetaminophen 650 mg Oral Q6H PRN Elyssa Gibbs, PA-C    aluminum-magnesium hydroxide-simethicone 30 mL Oral Q6H PRN Elyssa Archerm, PA-C    calcium carbonate 1,000 mg Oral Daily PRN Elyssa Elm, PA-C    cefazolin 1,000 mg Intravenous Q8H Elyssa Gibbs, PA-C Last Rate: 1,000 mg (12/12/19 0350)   docusate sodium 100 mg Oral BID Elyssa Gibbs, PA-C    enoxaparin 40 mg Subcutaneous Daily Elyssa Gibbs, PA-C    gabapentin 100 mg Oral Q8H Elyssa Gibbs, PA-C    iron sucrose 300 mg Intravenous Daily Elyssa Gibbs, PA-C    lactated ringers 1,000 mL Intravenous Once PRN Elyssa Gibbs, PA-C Last Rate: Stopped (12/12/19 0141)   And        lactated ringers 1,000 mL Intravenous Once PRN Elyssa Gibbs, PA-C Last Rate: Stopped (12/12/19 0343)   lactated ringers 1 5 mL/kg/hr Intravenous Continuous Elyssa Gibbs PA-C Last Rate: Stopped (12/12/19 0540)   levothyroxine 25 mcg Oral Daily Elyssa Gibbs, PA-C    LORazepam 2 mg Oral Q8H PRN Elyssa Gibbs, PA-C    methocarbamol 750 mg Oral Q6H Lori Yeager PA-C    morphine injection 2 mg Intravenous Q2H PRN Elyssa Gibbs, PA-C    ondansetron 4 mg Intravenous Q6H PRN Elyssa Gibbs, PA-C    oxyCODONE 10 mg Oral Q4H PRN Elyssa Elm, PA-C    oxyCODONE 5 mg Oral Q4H PRN Elyssa Elm, PA-C    pantoprazole 40 mg Oral Daily Elyssa Gibbs, PA-C    PARoxetine 60 mg Oral QAM Elyssa Archerm, PA-C    QUEtiapine 400 mg Oral HS Elyssa Elm, PA-C    sodium chloride 1,000 mL Intravenous Once PRN Elyssa Gibbs, PA-C    And        sodium chloride 1,000 mL Intravenous Once PRN Pinkgeraldine Elm, PA-C    ziprasidone 80 mg Oral BID Elyssa Gibbs, PA-C        Labs:     Results from last 7 days   Lab Units 12/12/19  0441 12/11/19  2138   WBC Thousand/uL 4 33  --    HEMOGLOBIN g/dL 6 4* 8 4*   HEMATOCRIT % 20 4* 26 1*   PLATELETS Thousands/uL 156  --      Results from last 7 days   Lab Units 19  0441 19   SODIUM mmol/L 136  --    POTASSIUM mmol/L 3 5  --    CHLORIDE mmol/L 101  --    CO2 mmol/L 30  --    CO2, I-STAT mmol/L  --  28   BUN mg/dL 4*  --    CREATININE mg/dL 0 64  --    GLUCOSE, ISTAT mg/dl  --  155*   CALCIUM mg/dL 7 7*  --                 Results from last 7 days   Lab Units 19  2121   POC GLUCOSE mg/dl 163*       Lab Results   Component Value Date    BLOODCX No Growth After 5 Days  2018    BLOODCX Staphylococcus coagulase negative (A) 2018    URINECX 70,000-79,000 cfu/ml Mixed Contaminants X5 2016       Input and Output Summary (last 24 hours): Intake/Output Summary (Last 24 hours) at 2019 0802  Last data filed at 2019 0700  Gross per 24 hour   Intake 4953 05 ml   Output 2275 ml   Net 2678 05 ml       Imaging:     No orders to display       *Labs /Radiology studiesReviewed  *Medications reviewed and reconciled as needed  *Please refer to order section for additional ordered labs studies  *Case discussed with primary attending during morning huddle case rounds    Vitals:   Temp (24hrs), Av 8 °F (36 6 °C), Min:97 3 °F (36 3 °C), Max:99 3 °F (37 4 °C)    Temp:  [97 3 °F (36 3 °C)-99 3 °F (37 4 °C)] 97 7 °F (36 5 °C)  HR:  [] 105  Resp:  [17-22] 18  BP: ()/(42-82) 73/44  SpO2:  [97 %-100 %] 100 %  Body mass index is 27 9 kg/m²       Physical Exam:   General Appearance: no distress, conversive  HEENT: PERRLA, conjuctiva normal; oropharynx clear; mucous membranes moist;   Neck:  Supple, no lymphadenopathy or thyromegaly  Lungs: CTA, normal respiratory effort, no retractions, expiratory effort normal  CV: regular rate and rhythm , PMI normal   ABD: soft, obese non tender, no masses , no hepatic or splenomegaly  EXT: DP pulses intact, no lymphadenopathy, no edema, right hip drain/dressing intact  : waiting to void  Skin: normal turgor, normal texture, no rash  Psych: affect normal, mood normal  Neuro: AAOx3; sleepy this a m           Invasive Devices     Peripheral Intravenous Line            Peripheral IV 12/11/19 Left Wrist less than 1 day    Peripheral IV 12/11/19 Right Hand less than 1 day          Drain            Urethral Catheter Non-latex 16 Fr  less than 1 day                   Code Status: Level 1 - Full Code  Current Length of Stay: 1 day(s)    Total floor / unit time spent today 45 minutes with more than 50% spent counseling/coordinating care  Counseling includes discussion with patient re: progress  and discussion with patient of his/her current medical state/information  Coordination of patient's care was performed in conjunction with primary service  Time invested included review of patient's labs, vitals, and management of their comorbidities with continued monitoring  In addition, this patient was discussed with medical team including physician and advanced extenders  The care of the patient was extensively discussed and appropriate treatment plan was formulated unique for this patient  ** Please Note: Fluency Direct voice to text software may have been used in the creation of this document   Audio transcription errors may occur**

## 2019-12-12 NOTE — ANESTHESIA POSTPROCEDURE EVALUATION
Post-Op Assessment Note    CV Status:  Stable  Pain Score: 0    Pain management: adequate     Mental Status:  Alert and awake   Hydration Status:  Euvolemic   PONV Controlled:  Controlled   Airway Patency:  Patent   Post Op Vitals Reviewed: Yes      Staff: CRNA           BP (P) 124/75 (12/11/19 2112)    Temp (P) 98 3 °F (36 8 °C) (12/11/19 2112)    Pulse (!) (P) 121 (12/11/19 2112)   Resp (P) 22 (12/11/19 2112)    SpO2 (P) 100 % (12/11/19 2112)

## 2019-12-12 NOTE — PLAN OF CARE
Problem: OCCUPATIONAL THERAPY ADULT  Goal: Performs self-care activities at highest level of function for planned discharge setting  See evaluation for individualized goals  Description  Treatment Interventions: ADL retraining, Functional transfer training, Endurance training, Patient/family training, Equipment evaluation/education, Compensatory technique education, Energy conservation, Activityengagement          See flowsheet documentation for full assessment, interventions and recommendations  Note:   Limitation: Decreased ADL status, Decreased endurance, Decreased self-care trans, Decreased high-level ADLs  Prognosis: Good  Assessment: Pt is a 61 y o  female who was admitted to [facility] on 12/11/2019 with Primary osteoarthritis of right hip  Pt s/p R anterior ELIZABETH 12/11/2019  Pt's problem list also includes PMH of lumbar radiculopathy, DDD, spondylolisthesis, osteoporosis, T12 compression fracture, anxiety, bulimia nervosa in remission, shizoaffective disorder  At baseline pt was completing all ADLs/IADLs IND, ambulating in household IND w/o AD (reports recent use of manual w/c in community 2' hip pain), (+) driving, recently stopped working (2 weeks ago) however wishes to return  Pt is also her mother's primary caregiver  Pt lives with her mother in a 1 SH with 1 [facility]  Pt reports her neighbor is currently caring for her mother (CM aware of situation)  Pt reports she has no other social support  Currently pt requires MIN-MOD A for overall ADLS and MIN A for functional mobility/transfers with RW  Pt currently presents with impairments in the following categories -steps to enter environment, limited home support, difficulty performing ADLS and difficulty performing IADLS  activity tolerance, endurance and standing balance/tolerance   These impairments, as well as pt's fatigue, pain, decreased caregiver support and risk for falls  limit pt's ability to safely engage in all baseline areas of occupation, includinggrooming, bathing, dressing, toileting, functional mobility/transfers, community mobility, driving, meal prep, cleaning and leisure activities   From OT standpoint, recommend SHORT TERM REHAB vs  HOME with family support upon D/C  OT will continue to follow to address the below stated goals        OT Discharge Recommendation: Other (Comment)(STR vs  [address] pending progress)

## 2019-12-12 NOTE — SOCIAL WORK
Cm completed MA-51 and gave to ortho resident for attending to sign  Cm also completed PASRR  Cm will need to send to [facility] once signed MA-51 is received  Pt will be optioned

## 2019-12-12 NOTE — PHYSICAL THERAPY NOTE
PHYSICAL THERAPY Cancellation NOTE    Patient Name: Amador Severe  PJVQD'N Date: 12/12/2019     PT orders received, pt's chart reviewed  Pt with blood loss anemia and most recent hemoglobin was 6 4  Per RN, Systolic BP's in 78V at this time  PT to Cancel PT Evaluation at this time, will continue to follow and attempt to see later today when medically appropriate  Thank you for the consultation        Scotty Renae, PT, DPT 12/12/2019

## 2019-12-12 NOTE — PROGRESS NOTES
Orthopedics   Helena Alejandra 61 y o  female MRN: [id number]  Unit/Bed#: -01      Subjective:  61 y  o female post operative day 1 right anterior total hip arthroplasty  Pt doing well  Pain controlled  Patient noted to have low BP overnight  She is currently receiving PRBC  Patient awake and alert  Denies CP, SOB, numbness, tingling or calf pain      Labs:  0   Lab Value Date/Time    HCT 20 4 (L) 12/12/2019 0441    HCT 26 1 (L) 12/11/2019 2138    HCT 35 3 12/04/2019 1004    HGB 6 4 (LL) 12/12/2019 0441    HGB 8 4 (L) 12/11/2019 2138    HGB 11 2 (L) 12/04/2019 1004    INR 0 99 12/04/2019 1004    WBC 4 33 12/12/2019 0441    WBC 3 36 (L) 12/04/2019 1004    WBC 4 81 11/14/2019 1333    CRP 4 8 (H) 12/04/2019 1004       Meds:    Current Facility-Administered Medications:     acetaminophen (TYLENOL) tablet 650 mg, 650 mg, Oral, Q6H PRN, Sosa Hanson PA-C    aluminum-magnesium hydroxide-simethicone (MYLANTA) 200-200-20 mg/5 mL oral suspension 30 mL, 30 mL, Oral, Q6H PRN, Sosa Hanson PA-C    calcium carbonate (TUMS) chewable tablet 1,000 mg, 1,000 mg, Oral, Daily PRN, Sosa Hanson PA-C    ceFAZolin (ANCEF) IVPB (premix) 1,000 mg, 1,000 mg, Intravenous, Q8H, Sosa Hanson PA-C, Last Rate: 100 mL/hr at 12/12/19 0350, 1,000 mg at 12/12/19 0350    docusate sodium (COLACE) capsule 100 mg, 100 mg, Oral, BID, Sosa Hanson PA-C    enoxaparin (LOVENOX) subcutaneous injection 40 mg, 40 mg, Subcutaneous, Daily, LINDA Zaragoza-WAYNE, 40 mg at 12/11/19 2335    gabapentin (NEURONTIN) capsule 100 mg, 100 mg, Oral, Q8H, LINDA Zaragoza-WAYNE, 100 mg at 12/12/19 0617    iron sucrose (VENOFER) 300 mg in sodium chloride 0 9 % 250 mL IVPB, 300 mg, Intravenous, Daily, Sosa Hanson PA-C    lactated ringers bolus 1,000 mL, 1,000 mL, Intravenous, Once PRN, Stopped at 12/12/19 0141 **AND** lactated ringers bolus 1,000 mL, 1,000 mL, Intravenous, Once PRN, Sosa Hanson PA-C, Stopped at 12/12/19 0568    lactated ringers infusion, 1 5 mL/kg/hr, Intravenous, Continuous, Sosa Hanson, PA-C, Stopped at 12/12/19 0540    levothyroxine tablet 25 mcg, 25 mcg, Oral, Daily, Sosa Valentin, PA-C, 25 mcg at 12/12/19 0510    LORazepam (ATIVAN) tablet 2 mg, 2 mg, Oral, Q8H PRN, Sosa Hanson, PA-C    methocarbamol (ROBAXIN) tablet 750 mg, 750 mg, Oral, Q6H [address], Sosa Valentin, PA-C, 750 mg at 12/12/19 0511    morphine injection 2 mg, 2 mg, Intravenous, Q2H PRN, Sosa Hanson, PA-C    ondansetron [facility]) injection 4 mg, 4 mg, Intravenous, Q6H PRN, Sosa Hanson, PA-C    oxyCODONE (ROXICODONE) immediate release tablet 10 mg, 10 mg, Oral, Q4H PRN, Sosa Hanson, PA-C, 10 mg at 12/11/19 2335    oxyCODONE (ROXICODONE) IR tablet 5 mg, 5 mg, Oral, Q4H PRN, Sosa Hanson, PA-C    pantoprazole (PROTONIX) EC tablet 40 mg, 40 mg, Oral, Daily, Sosa Valentin, PA-C    PARoxetine (PAXIL) tablet 60 mg, 60 mg, Oral, QAM, Sosa Valentin, PA-C    QUEtiapine (SEROquel) tablet 400 mg, 400 mg, Oral, HS, Sosa Valentin, PA-C, 400 mg at 12/11/19 2335    sodium chloride 0 9 % bolus 1,000 mL, 1,000 mL, Intravenous, Once PRN **AND** sodium chloride 0 9 % bolus 1,000 mL, 1,000 mL, Intravenous, Once PRN, Sosadon Hanson, PA-C    ziprasidone (GEODON) capsule 80 mg, 80 mg, Oral, BID, Sosa Valentin, PA-C    Blood Culture:   Lab Results   Component Value Date    BLOODCX No Growth After 5 Days  08/13/2018    BLOODCX Staphylococcus coagulase negative (A) 08/13/2018       Wound Culture:   No results found for: WOUNDCULT    Ins and Outs:  I/O last 24 hours: In: 4953 1 [I V :2453 1;  IV Piggyback:2500]  Out: 1625 [Urine:875; Blood:750]          Physical Exam:  Vitals:    12/12/19 0615   BP: (!) 75/44   Pulse: (!) 109   Resp: 18   Temp: (!) 97 3 °F (36 3 °C)   SpO2:      right lower extremity:  · Dressings C/D/I, no erythema, no swelling compared to contralateral hip/leg  · Sensation intact L2-S1  · Motor intact L2-S1  · 2+ dorsalis pedis   · Calf non-tender to palpation bilaterally    _*_*_*_*_*_*_*_*_*_*_*_*_*_*_*_*_*_*_*_*_*_*_*_*_*_*_*_*_*_*_*_*_*_*_*_*_*_*_*_*_*    Assessment: 61 y  o female post operative day 1 right anterior total hip arthroplasty      Plan:  · Weight Bearing as tolerated  · Follow up BP results after PRBC transfusion  · Will discuss with medical team regarding further management of BP  · Continue fluids  · Hold pain medication at this time due to low BP  · Anterior hip precautions  · DVT prophylaxis  · Analgesics  · PT/OT  · Patient noted to have acute blood loss anemia due to a drop in Hbg of > 2 0g from preop levels, will monitor vital signs and resuscitate with IV fluids as needed    Dino Cardenas PA-C

## 2019-12-12 NOTE — SOCIAL WORK
CM met with pt to discuss CM role in D/C planning  Pt reported the following:    Emergency Contact: Mother, Juan Jose Qi [phone] who is wc bound herself  POA/LW: None  Level of assist with ADL's PTA: IND  House or Apt: Lives with mother in ranch style home  FAMILIA to enter: 1 FAMILIA  DME: RW  VNA: None  SNF/Rehab: None    Transportation: Drives self  Help at home: None    Pharmacy/Rx Coverage: [facility] [place]  Name of PCP: Dr Sanaz Canales tx for MH/SA: 2018 [address] [facility] admission    Employment/Income: Disabled    Anticipated D/C Plan: Pt told CM that she is going to rehab and wants [facility]  Cm agreed to check with [facility] to see if they contract with pt's insurance  A post acute care recommendation was made by the care team for short term rehab vs home health  Discussed Freedom of Choice with pt  List of resources was given to pt via [facility]  Pt/family aware the list is custom filtered for them by [facility] and that [facility] post acute providers are designated  CM reviewed d/c planning process including the following: identifying help at home, patient preference for d/c planning needs, Discharge Lounge, [facility] Meds to Bed program, availability of treatment team to discuss questions or concerns patient and/or family may have regarding understanding medications and recognizing signs and symptoms once discharged  CM also encouraged patient to follow up with all recommended appointments after discharge  Patient advised of importance for patient and family to participate in managing patients medical well being

## 2019-12-12 NOTE — OP NOTE
OPERATIVE REPORT  PATIENT NAME: Rob Urbina    :  1960  MRN: [id number]  Pt Location: [facility] OR ROOM 18    SURGERY DATE: 2019    Surgeon(s) and Role:     * Yuly Aggarwal MD - Primary     * Marialuisa Hammond MD - Assisting     * Dinah Vincent PA-C - Assisting    Primary osteoarthritis of right hip [M16 11]    Post-Op Diagnosis Codes:     * Primary osteoarthritis of right hip [M16 11]    Procedure(s):  ARTHROPLASTY HIP TOTAL ANTERIOR    Specimen(s):  * No specimens in log *    Estimated Blood Loss:   750 mL    Drains:  [REMOVED] Urethral Catheter Non-latex 16 Fr  (Removed)   Reasons to continue Urinary Catheter  Post-operative urological requirements 2019  1:01 AM   Goal for Removal Remove POD#1 2019  1:01 AM   Site Assessment Clean;Skin intact 2019  1:01 AM   Collection Container Standard drainage bag 2019  1:01 AM   Securement Method Securing device (Describe) 2019  1:01 AM   Output (mL) 650 mL 2019  6:38 AM   Number of days: 1       Anesthesia Type:   General    Operative Indications:  Primary osteoarthritis of right hip [M16 11]  Severe DJD right Hip  This patient failed conservative treatment including injections, NSAID by mouth and physical therapy     Operative Findings:  Severe wear femoral head and acetabulum right hip    Complications:   none    Note:   This surgery could not be completed without the assistance of not   An assistant was required on both sides of the table   for retraction during the soft tissue dissection, for retraction   during the exposure of bone and cutting and reaming of the acetabulum   and femoral neck and preparing the femoral canal  Also, two people   were needed for retraction during the insertion of the provisional   implants during the reduction and during the insertion of the   permanent implants         Procedure and Technique:  The patient was administered a general anesthetic with endotracheal   intubation and prepped and draped in the usual sterile fashion for an   operation on the right hip on the hana table  The modified Roa-Alejandra approach was used  Sharp dissection was   carried out down through the subcutaneous tissue to the investing   fascia of the tensa fascia shelly muscle  This was incised for the   length of the muscle  The muscle was retracted laterally  The   perforating femoral vessels were carefully obliterated using the   Aquamantys  Dissection was carried out down to the capsule  A   capsulotomy was carried out across the edge of the acetabulum, down   the femoral neck and into the region of the greater trochanter  Both   sides were tagged with 0 Ethibond  The labrum  Ossified portion of the labrum    was removed using an osteotome and rongeurs  Dissection was carried   out medially around the medial calcar and down to the lesser   trochanter  Dissection was also carried out laterally to expose the   greater trochanter  The femoral neck was cut about a fingerbreadth   above the lesser trochanter using an oscillating saw finished off   laterally with an osteotome  The femoral head was extracted from the acetabulum using a corkscrew  The acetabulum was reamed starting with the size   Forty-four mm reamers in 1-mm increments to size 48  The 48 mm [place] cup sector 2   shell was inserted  A 25 screw was placed for improved stability  The Altrex neutral liner was inserted  Attention was then drawn to the proximal femur  The soft tissue was removed from the region between the femoral neck and greater trochanter  A box osteotome was used to cut into the lateral aspect of the proximal   femur  A combination of rongeur and hand gouge was used, along with a curette   to remove cancellous bone out to the lateral wall  The chili pepper   broach was inserted  Then the size 0, 1, 2, 3, 4, 5, 6 and 7 broaches were   inserted   The calcar planer was used to bring the femoral neck down to   the appropriate level for the provisional   The size 7 seemed to be   the appropriate size  Trial reduction was carried out with a   Plus five neck with a +5 neck length and the leg lengths appeared to be equal under the guide   of image intensification  The provisionals were removed from the femur, and the Actis size 7 collared    stem was inserted, and the 32 mm Biolox head with a +5 neck length was   applied  Trial reduction was carried out and the leg lengths appeared   equal  The hip was stable when extended to the floor with 45 degrees of external rotation and  there was no tendency toward lateral subluxation  The wound was thoroughly irrigated with sterile saline solution  It was   also irrigated with dilute chlorhexidine solution  The capsule was closed using 0   Ethibond  The investing fascia of the tensa fascia shelly muscle was   closed with a running 0 Vicryl  The subcutaneous tissue was closed   using 2-0 Vicryl in an inverted fashion  The skin was closed using   3 - 0 monocryl in a subcuticular fashion    The skin was reprepped with chlorhexidine prep  A Prinwo dressing was applied        The patient tolerated the procedure well and left the operating room in   good condition          I was present for the entire procedure    Patient Disposition:  PACU     SIGNATURE: Golden Larose MD  DATE: December 12, 2019  TIME: 1:19 PM

## 2019-12-12 NOTE — PHYSICAL THERAPY NOTE
Physical Therapy Evaluation Note     Patient Name: Estevan Martinez    [facility] Date: 12/12/2019     Problem List  Principal Problem:    Primary osteoarthritis of right hip       Past Medical History  Past Medical History:   Diagnosis Date    Anxiety     Back pain at L4-L5 level     Schreiber esophagus     Bulimia     Disease of thyroid gland     hypothyroid    GERD (gastroesophageal reflux disease)     Hyperlipidemia     Hypothyroidism     Leukopenia     Rheumatoid arthritis involving right hip (HCC)     Sciatica     Seizure ([address] )     due to medication mix up 8/2018 only one historically    Synovial cyst of lumbar spine     Vertigo     Weight gain         Past Surgical History  Past Surgical History:   Procedure Laterality Date    BONE MARROW BIOPSY      BREAST SURGERY      enlargement     COLONOSCOPY      [place] TOTAL HIP ARTHROPLASTY Right 12/11/2019    Procedure: ARTHROPLASTY HIP TOTAL ANTERIOR;  Surgeon: Selene Marino MD;  Location: [facility] MAIN [facility];  Service: Orthopedics    RHINOPLASTY             12/12/19 7836   Note Type   Note type Eval only   Pain Assessment   Pain Assessment 0-10   Pain Score Worst Possible Pain   Pain Type Surgical pain   Pain Location Hip   Pain Orientation Right   Home Living   Type of [address] One level   Additional Comments Pt lives in a [facility] with 3 FAMILIA  Pt is the main caregiver for her mother who recently had back surgery  Pt state that her neighbor is assisting her mother for the time being  Pt was I for mobility without an AD  Pt would use a w/c to get to doctors appointments  Pt worked part time as a professor  Pt owns a RW      Prior Function   Level of [place] Independent with ADLs and functional mobility   Lives With [facility] Help From Neighbor;Family   ADL Assistance Independent   IADLs Independent   Vocational Part time employment   Restrictions/Precautions   Weight Bearing Precautions Per Order Yes   RLE Weight Bearing Per Order WBAT   Other Precautions Multiple lines; Fall Risk;Pain   General   Family/Caregiver Present No   Cognition   Overall Cognitive Status WFL   Arousal/Participation Alert   Orientation Level Oriented X4   Memory Within functional limits   Following Commands Follows all commands and directions without difficulty   RLE Assessment   RLE Assessment X   RLE Overall AROM   R Hip Flexion -3/5   R Ankle Dorsiflexion 3+/5   LLE Assessment   LLE Assessment WFL   Coordination   Sensation WFL   Light Touch   RLE Light Touch Grossly intact   LLE Light Touch Grossly intact   Bed Mobility   Additional Comments unable to asses  Pt OOB at begining of session   Transfers   Sit to Stand 4  Minimal assistance   Additional items Assist x 1   Stand to Sit 4  Minimal assistance   Additional items Assist x 1   Ambulation/Elevation   Gait pattern Excessively slow; Step to; Foward flexed; Shuffling; Improper Weight shift   Gait Assistance 4  Minimal assist   Additional items Assist x 2   Assistive Device Rolling walker   Distance 8ft forward and back   Balance   Static Sitting Fair   Dynamic Sitting Fair   Static Standing Fair -   Dynamic Standing Fair -   Ambulatory Poor +   Endurance Deficit   Endurance Deficit Yes   Activity Tolerance   Activity Tolerance Patient limited by pain; Patient limited by fatigue   Medical Staff Made Aware OT    Nurse Made Aware nurse approved therapy session   Assessment   Prognosis Good   Problem List Decreased strength; Impaired balance;Decreased endurance;Decreased mobility;Pain   Assessment Pt is a 62 yo female admitted to [place] [address] on 12/11/2019 s/p R anterior total hip arthoplasty  DX: ELIZABETH, post op blood loss anemia, hypotension, hypothyroid, schizophrenia disorder  Two patient identifiers were used to confirm  Pt lives in a [facility] with 3 FAMILIA with her mother  Pt is her mothers caregiver  Pt was I for ADL's and mobility prior and did not use an AD   Pt did use an w/c for longer distances when going to her doctors appointments  Pt worked part time as a professor  Pt's impairments include reduced mobility, limited endurance, high fall risk, increased pain in R hip, limited support at home  These impairments limit the ability of the patient to perform mobility without increased assistance, return to PLOF and participate in everyday life activities  Pt would benefit from continued skilled therapy while in the hospital to improve overall mobility and work towards being I  Recommend discharge to rehab vs home PT pending progress  At the end of the session the patient was left in seated position with call bell and phone within reach  BP sittin/78mmHg  Barriers to Discharge Decreased caregiver support; Inaccessible home environment   Goals   STG Expiration Date 19   Short Term Goal #1 STG 1: Pt will perform transfers at a MI level to return to baseline of function  STG 2: Pt will ambulate 300ft with RW at a MI level to reduce the level of assistance needed upon d/c home  STG 3: Pt will negotiate 3 steps with HR at a MI level to ensure safety with activity when able to ambulate 150ft with RW without major gait abnormalities or LOB  STG 4: Pt will perform bed mobility at a I level to return to PLOF  PT Treatment Day 0   Plan   Treatment/Interventions Functional transfer training;LE strengthening/ROM; Elevations; Therapeutic exercise; Endurance training;Equipment eval/education; Bed mobility;Gait training   PT Frequency Other (Comment)  BID   Recommendation   Recommendation Other (Comment)  (rehab vs home PT)   Equipment Recommended Walker   PT - OK to Discharge No   Additional Comments need to trial steps and ambualte further    Modified Lares Scale   Modified Tiffany Scale 4   Barthel Index   Feeding 10   Bathing 0   Grooming Score 5   Dressing Score 5   Bladder Score 10   Bowels Score 10   Toilet Use Score 5   Transfers (Bed/Chair) Score 10   Mobility (Level Surface) Score 0   Stairs Score 0   Barthel Index Score 55   Amanda Marroquin, Pt, DPT

## 2019-12-12 NOTE — OCCUPATIONAL THERAPY NOTE
[address] Evaluation     Patient Name: Nelson Suh  NSALH'O Date: 12/12/2019  Problem List  Principal Problem:    Primary osteoarthritis of right hip    Past Medical History  Past Medical History:   Diagnosis Date    Anxiety     Back pain at L4-L5 level     Schreiber esophagus     Bulimia     Disease of thyroid gland     hypothyroid    GERD (gastroesophageal reflux disease)     Hyperlipidemia     Hypothyroidism     Leukopenia     Rheumatoid arthritis involving right hip (HCC)     Sciatica     Seizure ([address] )     due to medication mix up 8/2018 only one historically    Synovial cyst of lumbar spine     Vertigo     Weight gain      Past Surgical History  Past Surgical History:   Procedure Laterality Date    BONE MARROW BIOPSY      BREAST SURGERY      enlargement     COLONOSCOPY      [place] TOTAL HIP ARTHROPLASTY Right 12/11/2019    Procedure: ARTHROPLASTY HIP TOTAL ANTERIOR;  Surgeon: Veronica Cortez MD;  Location: [facility] MA[facility];  Service: Orthopedics    RHINOPLASTY             12/12/19 8223   Note Type   Note type Eval only   Restrictions/Precautions   Weight Bearing Precautions Per Order Yes   RLE Weight Bearing Per Order WBAT   Other Precautions Multiple lines; Fall Risk;Pain  (anterior ELIZABETH)   Pain Assessment   Pain Assessment 0-10   Pain Score Worst Possible Pain   Pain Type Surgical pain   Pain Location Hip   Pain Orientation Right   Hospital Pain Intervention(s) Repositioned; Ambulation/increased activity  (RN made aware)   Home Living   Type of [address] [facility] One level  (1 FAMILIA)   Bathroom Shower/Tub Tub/shower unit   Bathroom Toilet Standard   Bathroom Equipment Grab bars in [facility] [address]; Wheelchair-manual   Prior Function   Level of [place] Independent with ADLs and functional mobility   Lives With Other (Comment)  (mother)   Receives Help From Family; Neighbor   ADL Assistance Independent   IADLs Independent   Falls in the last 6 months 0 Comments Pt reports limited social support  Pt reports her mother recently had surgery and she is her mother's primary caregiver  Pt reports her neighbor is currently caring for her mother (CM aware of situation)  Lifestyle   Autonomy At baseline pt was completing all ADLs/IADLs IND, ambulating in household IND w/o AD (reports recent use of manual w/c in community 2' hip pain), (+) driving, recently stopped working (2 weeks ago) however wishes to return  Reciprocal Relationships supportive mother and neighbor  Pt reports no other social support   Service to Others worked PT as    Intrinsic Gratification pt enjoys working & wishes to return   Psychosocial   Psychosocial (WDL) WDL   Subjective   Subjective "I'm going to rehab " - Pt reports her physician told her she needs to go to rehab   ADL   [place] 5  Supervision/Setup   [facility] [address] 5  Supervision/Setup   LB Bathing Assistance 3  Moderate Assistance   [address] 5  Supervision/Setup    [address] 3  Moderate Assistance   LB Dressing Deficit Don/doff R sock; Don/doff L sock  (unable to flex trunk to reach feet from seated position 2' pain)   Toileting Assistance  4  Minimal Assistance   Bed Mobility   Additional Comments Pt seated OOB upon arrival  Left seated OOB with chair alarm on and all needs w/in reach at end of session   Transfers   Sit to Stand 4  Minimal assistance   Additional items Assist x 1; Armrests; Increased time required   Stand to Sit 4  Minimal assistance   Additional items Assist x 1; Increased time required;Verbal cues   Additional Comments RW  BP seated: 116/78; BP standin/75   Functional Mobility   Functional Mobility 4  Minimal assistance   Additional Comments w/in room   Additional items Rolling walker   Balance   Static Sitting Fair   Dynamic Sitting Fair   Static Standing Fair -   Dynamic Standing Fair -   Activity Tolerance Activity Tolerance Patient limited by pain   Medical Staff Made Aware PT   Nurse Made Aware Per RN pt appropriate to be seen   RUE Assessment   RUE Assessment WFL   LUE Assessment   LUE Assessment WFL   Hand Function   Gross Motor Coordination Functional   Fine Motor Coordination Functional   Cognition   Overall Cognitive Status WFL   Arousal/Participation Alert; Cooperative   Attention Within functional limits   Orientation Level Oriented X4   Memory Within functional limits   Following Commands Follows one step commands with increased time or repetition   Comments pt requiring occasional increased time to process/respond to commands   Assessment   Limitation Decreased ADL status; Decreased endurance;Decreased self-care trans;Decreased high-level ADLs   Prognosis Good   Assessment Pt is a 61 y o  female who was admitted to [facility] on 12/11/2019 with Primary osteoarthritis of right hip  Pt s/p R anterior ELIZABETH 12/11/2019  Pt's problem list also includes PMH of lumbar radiculopathy, DDD, spondylolisthesis, osteoporosis, T12 compression fracture, anxiety, bulimia nervosa in remission, shizoaffective disorder  At baseline pt was completing all ADLs/IADLs IND, ambulating in household IND w/o AD (reports recent use of manual w/c in community 2' hip pain), (+) driving, recently stopped working (2 weeks ago) however wishes to return  Pt is also her mother's primary caregiver  Pt lives with her mother in a 1 SH with 1 FAMILIA  Pt reports her neighbor is currently caring for her mother (CM aware of situation)  Pt reports she has no other social support  Currently pt requires MIN-MOD A for overall ADLS and MIN A for functional mobility/transfers with RW  Pt currently presents with impairments in the following categories -steps to enter environment, limited home support, difficulty performing ADLS and difficulty performing IADLS  activity tolerance, endurance and standing balance/tolerance   These impairments, as well as pt's fatigue, pain, decreased caregiver support and risk for falls  limit pt's ability to safely engage in all baseline areas of occupation, includinggrooming, bathing, dressing, toileting, functional mobility/transfers, community mobility, driving, meal prep, cleaning and leisure activities   From OT standpoint, recommend SHORT TERM REHAB vs  HOME with family support upon D/C - pending progress  OT will continue to follow to address the below stated goals  Goals   Patient Goals to go to rehab   LTG Time Frame 10-14   Long Term Goal #1 see goals below   Plan   Treatment Interventions ADL retraining;Functional transfer training; Endurance training;Patient/family training;Equipment evaluation/education; Compensatory technique education; Energy conservation; Activityengagement   Goal Expiration Date 12/26/19   OT Frequency 3-5x/wk   Recommendation   OT Discharge Recommendation Other (Comment)  (STR vs  HOME WITH FAMILY SUPPORT pending progress)   Barthel Index   Feeding 10   Bathing 0   Grooming Score 5   Dressing Score 5   Bladder Score 10   Bowels Score 10   Toilet Use Score 5   Transfers (Bed/Chair) Score 10   Mobility (Level Surface) Score 0   Stairs Score 0   Barthel Index Score 55   Modified Tiffany Scale   Modified [place] Scale 4          GOALS     Pt will improve activity tolerance to G for min 30 min txment sessions     Pt will complete UB ADLs with MOD IND and use of AE as needed     Pt will complete LB ADLs with MOD IND w/ use of AE as needed     Pt will complete toileting w/ MOD IND w/ G hygiene/thoroughness using DME as needed     Pt will improve functional transfers to Mod I on/off all surfaces using DME as needed w/ G balance/safety      Pt will improve functional mobility during ADL/IADL/leisure tasks to Mod I using DME as needed w/ G balance/safety      Pt will demonstrate G carryover of pt/caregiver education and training as appropriate w/ mod I w/o cues w/ good tolerance     Demo Good carryover with safe use of RW during functional tasks  Pt to participate in IADL simulation with MOD IND and Good endurance/safety      Assess DME needs        Yeny Zamorano, OT

## 2019-12-12 NOTE — PLAN OF CARE
Problem: Potential for Falls  Goal: Patient will remain free of falls  Description  INTERVENTIONS:  - Assess patient frequently for physical needs  -  Identify cognitive and physical deficits and behaviors that affect risk of falls    -  [place] fall precautions as indicated by assessment   - Educate patient/family on patient safety including physical limitations  - Instruct patient to call for assistance with activity based on assessment  - Modify environment to reduce risk of injury  - Consider OT/PT consult to assist with strengthening/mobility  Outcome: Progressing     Problem: PAIN - ADULT  Goal: Verbalizes/displays adequate comfort level or baseline comfort level  Description  Interventions:  - Encourage patient to monitor pain and request assistance  - Assess pain using appropriate pain scale  - Administer analgesics based on type and severity of pain and evaluate response  - Implement non-pharmacological measures as appropriate and evaluate response  - Consider cultural and social influences on pain and pain management  - Notify physician/advanced practitioner if interventions unsuccessful or patient reports new pain  Outcome: Progressing     Problem: INFECTION - ADULT  Goal: Absence or prevention of progression during hospitalization  Description  INTERVENTIONS:  - Assess and monitor for signs and symptoms of infection  - Monitor lab/diagnostic results  - Monitor all insertion sites, i e  indwelling lines  - Monitor endotracheal if appropriate and nasal secretions for changes in amount and color  - [place] appropriate cooling/warming therapies per order  - Administer medications as ordered  - Instruct and encourage patient and family to use good hand hygiene technique  - Identify and instruct in appropriate isolation precautions for identified infection/condition   Outcome: Progressing  Goal: Absence of fever/infection during neutropenic period  Description  INTERVENTIONS:  - Monitor WBC    Outcome: Progressing     Problem: SAFETY ADULT  Goal: Maintain or return to baseline ADL function  Description  INTERVENTIONS:  -  Assess patient's ability to carry out ADLs; assess patient's baseline for ADL function and identify physical deficits which impact ability to perform ADLs (bathing, care of mouth/teeth, toileting, grooming, dressing, etc )  - Assess/evaluate cause of self-care deficits   - Assess range of motion  - Assess patient's mobility; develop plan if impaired  - Assess patient's need for assistive devices and provide as appropriate  - Encourage maximum independence but intervene and supervise when necessary  - Involve family in performance of ADLs  - Assess for home care needs following discharge   - Consider OT consult to assist with ADL evaluation and planning for discharge  - Provide patient education as appropriate  Outcome: Progressing  Goal: Maintain or return mobility status to optimal level  Description  INTERVENTIONS:  - Assess patient's baseline mobility status (ambulation, transfers, stairs, etc )    - Identify cognitive and physical deficits and behaviors that affect mobility  - Identify mobility aids required to assist with transfers and/or ambulation (gait belt, sit-to-stand, lift, walker, cane, etc )  - [place] fall precautions as indicated by assessment  - Record patient progress and toleration of activity level on Mobility SBAR; progress patient to next Phase/Stage  - Instruct patient to call for assistance with activity based on assessment  - Consider rehabilitation consult to assist with strengthening/weightbearing, etc   Outcome: Progressing     Problem: DISCHARGE PLANNING  Goal: Discharge to home or other facility with appropriate resources  Description  INTERVENTIONS:  - Identify barriers to discharge w/patient and caregiver  - Arrange for needed discharge resources and transportation as appropriate  - Identify discharge learning needs (meds, wound care, etc )  - Refer to Case Management Department for coordinating discharge planning if the patient needs post-hospital services based on physician/advanced practitioner order or complex needs related to functional status, cognitive ability, or social support system   Outcome: Progressing     Problem: Knowledge Deficit  Goal: Patient/family/caregiver demonstrates understanding of disease process, treatment plan, medications, and discharge instructions  Description  Complete learning assessment and assess knowledge base    Interventions:  - Provide teaching at level of understanding  - Provide teaching via preferred learning methods  Outcome: Progressing

## 2019-12-13 LAB
ABO GROUP BLD BPU: NORMAL
ANION GAP SERPL CALCULATED.3IONS-SCNC: 4 MMOL/L (ref 4–13)
BPU ID: NORMAL
BUN SERPL-MCNC: 4 MG/DL (ref 5–25)
CALCIUM SERPL-MCNC: 8.4 MG/DL (ref 8.3–10.1)
CHLORIDE SERPL-SCNC: 99 MMOL/L (ref 100–108)
CO2 SERPL-SCNC: 29 MMOL/L (ref 21–32)
CREAT SERPL-MCNC: 0.45 MG/DL (ref 0.6–1.3)
CROSSMATCH: NORMAL
ERYTHROCYTE [DISTWIDTH] IN BLOOD BY AUTOMATED COUNT: 21.9 % (ref 11.6–15.1)
GFR SERPL CREATININE-BSD FRML MDRD: 110 ML/MIN/1.73SQ M
GLUCOSE SERPL-MCNC: 93 MG/DL (ref 65–140)
HCT VFR BLD AUTO: 28.2 % (ref 34.8–46.1)
HGB BLD-MCNC: 9.3 G/DL (ref 11.5–15.4)
MCH RBC QN AUTO: 27.6 PG (ref 26.8–34.3)
MCHC RBC AUTO-ENTMCNC: 33 G/DL (ref 31.4–37.4)
MCV RBC AUTO: 84 FL (ref 82–98)
PLATELET # BLD AUTO: 163 THOUSANDS/UL (ref 149–390)
PMV BLD AUTO: 9.1 FL (ref 8.9–12.7)
POTASSIUM SERPL-SCNC: 3 MMOL/L (ref 3.5–5.3)
RBC # BLD AUTO: 3.37 MILLION/UL (ref 3.81–5.12)
SODIUM SERPL-SCNC: 132 MMOL/L (ref 136–145)
UNIT DISPENSE STATUS: NORMAL
UNIT PRODUCT CODE: NORMAL
UNIT RH: NORMAL
WBC # BLD AUTO: 5.21 THOUSAND/UL (ref 4.31–10.16)

## 2019-12-13 PROCEDURE — 80048 BASIC METABOLIC PNL TOTAL CA: CPT | Performed by: PHYSICIAN ASSISTANT

## 2019-12-13 PROCEDURE — 85027 COMPLETE CBC AUTOMATED: CPT | Performed by: PHYSICIAN ASSISTANT

## 2019-12-13 PROCEDURE — 97110 THERAPEUTIC EXERCISES: CPT

## 2019-12-13 PROCEDURE — 99024 POSTOP FOLLOW-UP VISIT: CPT | Performed by: PHYSICIAN ASSISTANT

## 2019-12-13 PROCEDURE — 97116 GAIT TRAINING THERAPY: CPT

## 2019-12-13 PROCEDURE — 97530 THERAPEUTIC ACTIVITIES: CPT

## 2019-12-13 PROCEDURE — 97535 SELF CARE MNGMENT TRAINING: CPT

## 2019-12-13 RX ORDER — POTASSIUM CHLORIDE 20 MEQ/1
20 TABLET, EXTENDED RELEASE ORAL ONCE
Status: COMPLETED | OUTPATIENT
Start: 2019-12-13 | End: 2019-12-13

## 2019-12-13 RX ADMIN — METHOCARBAMOL TABLETS 750 MG: 750 TABLET, COATED ORAL at 11:20

## 2019-12-13 RX ADMIN — IRON SUCROSE 300 MG: 20 INJECTION, SOLUTION INTRAVENOUS at 09:31

## 2019-12-13 RX ADMIN — PANTOPRAZOLE SODIUM 40 MG: 40 TABLET, DELAYED RELEASE ORAL at 08:06

## 2019-12-13 RX ADMIN — METHOCARBAMOL TABLETS 750 MG: 750 TABLET, COATED ORAL at 17:07

## 2019-12-13 RX ADMIN — DOCUSATE SODIUM 100 MG: 100 CAPSULE, LIQUID FILLED ORAL at 08:06

## 2019-12-13 RX ADMIN — DOCUSATE SODIUM 100 MG: 100 CAPSULE, LIQUID FILLED ORAL at 17:07

## 2019-12-13 RX ADMIN — POTASSIUM CHLORIDE 20 MEQ: 1500 TABLET, EXTENDED RELEASE ORAL at 09:30

## 2019-12-13 RX ADMIN — LORAZEPAM 2 MG: 1 TABLET ORAL at 08:06

## 2019-12-13 RX ADMIN — PAROXETINE 60 MG: 20 TABLET, FILM COATED ORAL at 08:05

## 2019-12-13 RX ADMIN — METHOCARBAMOL TABLETS 750 MG: 750 TABLET, COATED ORAL at 05:21

## 2019-12-13 RX ADMIN — ENOXAPARIN SODIUM 40 MG: 40 INJECTION SUBCUTANEOUS at 21:44

## 2019-12-13 RX ADMIN — METHOCARBAMOL TABLETS 750 MG: 750 TABLET, COATED ORAL at 21:44

## 2019-12-13 RX ADMIN — GABAPENTIN 100 MG: 100 CAPSULE ORAL at 21:45

## 2019-12-13 RX ADMIN — CALCIUM CARBONATE (ANTACID) CHEW TAB 500 MG 1000 MG: 500 CHEW TAB at 06:24

## 2019-12-13 RX ADMIN — ZIPRASIDONE HCL 80 MG: 40 CAPSULE ORAL at 17:07

## 2019-12-13 RX ADMIN — GABAPENTIN 100 MG: 100 CAPSULE ORAL at 15:39

## 2019-12-13 RX ADMIN — GABAPENTIN 100 MG: 100 CAPSULE ORAL at 06:24

## 2019-12-13 RX ADMIN — QUETIAPINE FUMARATE 400 MG: 100 TABLET ORAL at 21:52

## 2019-12-13 RX ADMIN — OXYCODONE HYDROCHLORIDE 10 MG: 10 TABLET ORAL at 09:31

## 2019-12-13 RX ADMIN — SODIUM CHLORIDE, SODIUM LACTATE, POTASSIUM CHLORIDE, AND CALCIUM CHLORIDE 6.89 ML/KG/HR: .6; .31; .03; .02 INJECTION, SOLUTION INTRAVENOUS at 23:46

## 2019-12-13 RX ADMIN — ZIPRASIDONE HCL 80 MG: 40 CAPSULE ORAL at 08:06

## 2019-12-13 RX ADMIN — LEVOTHYROXINE SODIUM 25 MCG: 25 TABLET ORAL at 05:21

## 2019-12-13 NOTE — OCCUPATIONAL THERAPY NOTE
Occupational Therapy Treatment Note:     12/13/19 0935   Restrictions/Precautions   Weight Bearing Precautions Per Order Yes   RLE Weight Bearing Per Order WBAT   Other Precautions Fall Risk;Pain; Chair Alarm   Pain Assessment   Pain Score Worst Possible Pain  (No notable outward signs of pain)   Pain Type Surgical pain   Pain Location Hip   Pain Orientation Right   ADL   Where Assessed Chair   Grooming Assistance 5  Supervision/Setup   Grooming Deficit Wash/dry face   Grooming Comments seated   UB Bathing Assistance 4  Minimal Assistance   UB Bathing Deficit Chest;Right arm;Left arm; Abdomen   UB Bathing Comments seated, increased time, VC   LB Bathing Assistance 3  Moderate Assistance   LB Bathing Deficit Perineal area; Buttocks;Right upper leg;Left upper leg;Right lower leg including foot; Left lower leg including foot   LB Bathing Comments requires increased VC and encouragment initially, pt reporting " I cant, it hurts too much" Is able to compelte proximal hygiene in stance, requies assit for distal LB self care   UB Dressing Assistance 4  Minimal Assistance   UB Dressing Deficit Thread RUE; Thread LUE;Pull around back   UB Dressing Comments don gown, seated   LB Dressing Assistance 2  Maximal Assistance   LB Dressing Deficit Don/doff R sock; Don/doff L sock   LB Dressing Comments seated   Toileting Assistance  4  Minimal Assistance   Toileting Deficit Clothing management up   Toileting Comments can compelte hygiene with VC and encouragment to attempt   Functional Standing Tolerance   Time ~2 mins   Activity static standing    Comments RW level   Bed Mobility   Additional Comments OOB upon presentation and at end of session   Transfers   Sit to Stand 4  Minimal assistance   Additional items Assist x 1; Increased time required;Verbal cues   Stand to Sit 4  Minimal assistance   Additional items Assist x 1; Increased time required;Verbal cues   Stand pivot 4  Minimal assistance   Additional items Assist x 1; Increased time required;Verbal cues   Additional Comments increased time, VC for safety and carryover of techqniues, RW leevl   Functional Mobility   Functional Mobility 4  Minimal assistance   Additional Comments very short distance in room, c o  pain and dizziness limiting her BP taken in sit and stand WNL   Additional items Rolling walker   Toilet Transfers   Toilet Transfer From   (chair)   Toilet Transfer Type To and from   Toilet Transfer to Standard bedside commode   Toilet Transfer Technique Ambulating;Stand pivot   Toilet Transfers Minimal assistance;Verbal cues   Toilet Transfers Comments increased time, excessivly  slow, increased VC   Cognition   Overall Cognitive Status [facility]   Arousal/Participation Alert; Cooperative   Attention Attends with cues to redirect   Orientation Level Oriented X4   Memory Within functional limits   Following Commands Follows one step commands with increased time or repetition   Comments Requires cues for safety and for general direction during self care wash and dress ADL tasks, pt intiaaly reporting " I cant do this this becuase I cant move" with education and encouragment, pt is able to compelte self care tasks with some assist following same  uues for safety and carryover of trafner techqniues required throguhout as well   Activity Tolerance   Activity Tolerance Patient limited by pain   Medical Staff Made Aware NSG aware   Assessment   Assessment Pt was seen this date for OT tx session focusing on self care tasks, sit to stand progressions, standing tolernace, tranfers, functional mobility, fall prevention educaiton, recall of anterior hip precaution, energy conservation and compensatory techniques and overall activity toelrance  Pt presents seated OOB in chair, comeptles previously mentioned tasks at documented assist levels, see above  Pt continues to requrie overall min A for mobility tasks for safety and increased cues for direction following as wel las increased encouragment   Pt requires increased encouragmetn to initate self care tasks this date and continues to requrie increased assist for LB self care especially  Pt compeltes toileting, reports she " really had to go but didnt want to walk to the bathroom" as she did not call for assistance or allow anyone to know she needed to void  Educated pt on the improtance of call bell use to call for assistance as needed as to not risk falls, discomfort etc when needs are to be met  Reports understanding of same  Pt resting OOB in chair at end of session with all needs in reach  Barriers to d/c at this time incluse in accessable home enviroment, limited family support and continued level of assist required at thsi time  WOuld benefit from continued OT tx to improve overall funciotnal abilities   Continue to follow with current POC     Plan   Treatment Interventions ADL retraining   Goal Expiration Date 12/26/19   OT Treatment Day 1   OT Frequency 3-5x/wk   Recommendation   OT Discharge Recommendation Other (Comment)  (Home VS STR pending progress and available support )       ILIANA Blackman

## 2019-12-13 NOTE — PROGRESS NOTES
Progress Note - Orthopedics   Joaquin Other 61 y o  female MRN: [id number]  Unit/Bed#: -01      Subjective:    61 y  o female POD #2 R anterior ELIZABETH  Pt received pRBCs yesterday  Only complaint other than hip pain is stomach pain  Pt has not had a BM yet, but states she normally is constipated  Pt also with h/o reflux       Labs:  0   Lab Value Date/Time    HCT 28 2 (L) 12/13/2019 0443    HCT 28 3 (L) 12/12/2019 1806    HCT 24 2 (L) 12/12/2019 1024    HGB 9 3 (L) 12/13/2019 0443    HGB 9 5 (L) 12/12/2019 1806    HGB 7 8 (L) 12/12/2019 1024    INR 0 99 12/04/2019 1004    WBC 5 21 12/13/2019 0443    WBC 5 20 12/12/2019 1024    WBC 4 33 12/12/2019 0441    CRP 4 8 (H) 12/04/2019 1004       Meds:    Current Facility-Administered Medications:     acetaminophen (TYLENOL) tablet 650 mg, 650 mg, Oral, Q6H PRN, Eura Budd, PA-C    aluminum-magnesium hydroxide-simethicone (MYLANTA) 200-200-20 mg/5 mL oral suspension 30 mL, 30 mL, Oral, Q6H PRN, Eura Budd, PA-C    calcium carbonate (TUMS) chewable tablet 1,000 mg, 1,000 mg, Oral, Daily PRN, Eura Budd, PA-C, 1,000 mg at 12/13/19 7460    docusate sodium (COLACE) capsule 100 mg, 100 mg, Oral, BID, Eura Budd, PA-C, 100 mg at 12/12/19 1731    enoxaparin (LOVENOX) subcutaneous injection 40 mg, 40 mg, Subcutaneous, Daily, Eura Budd, PA-C, 40 mg at 12/12/19 2258    gabapentin (NEURONTIN) capsule 100 mg, 100 mg, Oral, Q8H, Eura Budd, PA-C, 100 mg at 12/13/19 5261    iron sucrose (VENOFER) 300 mg in sodium chloride 0 9 % 250 mL IVPB, 300 mg, Intravenous, Daily, Eura Budd, PA-C, 300 mg at 12/12/19 1114    lactated ringers infusion, 1 5 mL/kg/hr, Intravenous, Continuous, Eura Budd, PA-C, Stopped at 12/12/19 0540    levothyroxine tablet 25 mcg, 25 mcg, Oral, Daily, Eura Budd, PA-C, 25 mcg at 12/13/19 0521    LORazepam (ATIVAN) tablet 2 mg, 2 mg, Oral, Q8H PRN, Eura Budd, PA-C, 2 mg at 12/12/19 1002   methocarbamol (ROBAXIN) tablet 750 mg, 750 mg, Oral, Q6H [address], Yordan Dante, PA-C, 750 mg at 12/13/19 2089    morphine injection 2 mg, 2 mg, Intravenous, Q2H PRN, Yordan Dante, PA-C    ondansetron [facility]) injection 4 mg, 4 mg, Intravenous, Q6H PRN, Yordan Dante, PA-C    oxyCODONE (ROXICODONE) immediate release tablet 10 mg, 10 mg, Oral, Q4H PRN, Yordan Dante, PA-C, 10 mg at 12/12/19 2051    oxyCODONE (ROXICODONE) IR tablet 5 mg, 5 mg, Oral, Q4H PRN, Yordan Dante, PA-C    pantoprazole (PROTONIX) EC tablet 40 mg, 40 mg, Oral, Daily, Yordan Dante, PA-C, 40 mg at 12/12/19 0914    PARoxetine (PAXIL) tablet 60 mg, 60 mg, Oral, QAM, Yordan Dante, PA-C, 60 mg at 12/12/19 0914    QUEtiapine (SEROquel) tablet 400 mg, 400 mg, Oral, HS, Yordan Dante, PA-C, 400 mg at 12/12/19 2257    ziprasidone (GEODON) capsule 80 mg, 80 mg, Oral, BID, Yordan Dante, PA-C, 80 mg at 12/12/19 1731    Blood Culture:   Lab Results   Component Value Date    BLOODCX No Growth After 5 Days  08/13/2018    BLOODCX Staphylococcus coagulase negative (A) 08/13/2018       Wound Culture:   No results found for: WOUNDCULT    Ins and Outs:  I/O last 24 hours: In: 3165 [P O :2465; Blood:700]  Out: 3300 [Urine:3300]          Physical:  Vitals:    12/13/19 0703   BP: 120/68   Pulse: 100   Resp: 18   Temp: 99 1 °F (37 3 °C)   SpO2: 97%     Musculoskeletal: right Lower Extremity  · Dressing c/d/i  · Calf soft, compressible  · SILT s/sp/dp/t    · +fhl/ehl, +ankle dorsi/plantar flexion  · +PT pulse    Abdomen is soft, non distended  Pt has ttp along the epigastric region     _*_*_*_*_*_*_*_*_*_*_*_*_*_*_*_*_*_*_*_*_*_*_*_*_*_*_*_*_*_*_*_*_*_*_*_*_*_*_*_*_*    Assessment:    61 y  o female POD #2 R anterior ELIZABETH     Plan:  · WBAT RLE  · PT/OT  · Pain control  · DVT ppx  · Continue to monitor vitals  Hgb stable    · Appreciate medical advise for stomach pain - did have nurse give TUMs to see if this provides any relief (pt also on pantoprazole)  · Patient noted to have acute blood loss anemia due to a drop in Hbg of > 2 0g from preop levels, will monitor vital signs and resuscitate with IV fluids as needed  · Dispo: Ortho will follow    Marlene Sumner PA-C

## 2019-12-13 NOTE — PLAN OF CARE
Problem: PHYSICAL THERAPY ADULT  Goal: Performs mobility at highest level of function for planned discharge setting  See evaluation for individualized goals  Outcome: Progressing  Note:   Prognosis: Good  Problem List: Decreased strength, Decreased endurance, Impaired balance, Decreased mobility, Decreased coordination, Decreased cognition, Impaired judgement, Decreased safety awareness, Orthopedic restrictions, Pain  Assessment: Patient lying supine in bed, agreeable to participate in therapy  She was able to transfer and ambulate with improved mobility increased distances as noted with use of RW, min A to supervision  Patient requires encouragment to particiapte in therapy, as seems to perform better following  She was able to sit EOB to perform TE as noted  She would continue to benefit from skilled PT to maximize functional independence  Barriers to Discharge: Inaccessible home environment, Decreased caregiver support     Recommendation: Post acute IP rehab     PT - OK to Discharge: Yes    See flowsheet documentation for full assessment

## 2019-12-13 NOTE — SOCIAL WORK
CM met with pt to explain [facility] evaluation  Pt signed Jonny Mendoza consents  CM faxed PASRR and [id number] to [facility]  MV declined  Pt chose SNF referrals to [facility] and [facility]  CM sent referrals

## 2019-12-13 NOTE — PLAN OF CARE
Problem: OCCUPATIONAL THERAPY ADULT  Goal: Performs self-care activities at highest level of function for planned discharge setting  See evaluation for individualized goals  Description  Treatment Interventions: ADL retraining, Functional transfer training, Endurance training, Patient/family training, Equipment evaluation/education, Compensatory technique education, Energy conservation, Activityengagement          See flowsheet documentation for full assessment, interventions and recommendations  Outcome: Progressing  Note:   Limitation: Decreased ADL status, Decreased endurance, Decreased self-care trans, Decreased high-level ADLs  Prognosis: Good  Assessment: Pt was seen this date for OT tx session focusing on self care tasks, sit to stand progressions, standing tolernace, tranfers, functional mobility, fall prevention educaiton, recall of anterior hip precaution, energy conservation and compensatory techniques and overall activity toelrance  Pt presents seated OOB in chair, comeptles previously mentioned tasks at documented assist levels, see above  Pt continues to requrie overall min A for mobility tasks for safety and increased cues for direction following as wel las increased encouragment  Pt requires increased encouragmetn to initate self care tasks this date and continues to requrie increased assist for LB self care especially  Pt compeltes toileting, reports she " really had to go but didnt want to walk to the bathroom" as she did not call for assistance or allow anyone to know she needed to void  Educated pt on the improtance of call bell use to call for assistance as needed as to not risk falls, discomfort etc when needs are to be met  Reports understanding of same  Pt resting OOB in chair at end of session with all needs in reach  Barriers to d/c at this time incluse in accessable home enviroment, limited family support and continued level of assist required at si time   WOuld benefit from continued OT tx to improve overall funciotnal abilities   Continue to follow with current POC       OT Discharge Recommendation: Other (Comment)(Home VS STR pending progress and available support )

## 2019-12-13 NOTE — PROGRESS NOTES
Internal Medicine Progress Note  Patient: Cuauhtemoc Khanna  Age/sex: 61 y o  female  Medical Record #: [id number]      ASSESSMENT/PLAN: (Interval History)  Cuauhtemoc Khanna is seen and examined and management for following issues:    Status Post right Total HIP ARTHROPLASTY    Pain controlled  Continue encourage incentive spirometry; monitor fever curve  DVT prophylaxis in place and reviewed  Results from last 7 days   Lab Units 12/13/19  0443   WBC Thousand/uL 5 21   HEMOGLOBIN g/dL 9 3*   HEMATOCRIT % 28 2*   PLATELETS Thousands/uL 163     ·      Post operative blood loss anemia  · hgb 9 3 post transfusion  · Did receive 2U PRBCs  · Continue to monitor CBC  · venofer protocol     Hypotension  · resolved  · Cont IVF/blood transfusion     Hypothyroid  · Cont supplementation     Schizophrenia disorder  · Cont current medications uninterrupted  · Mood appears stable      The above assessment and plan was reviewed and updated as determined by my evaluation of the patient on 12/13/2019      Labs:   Results from last 7 days   Lab Units 12/13/19  0443 12/12/19  1806 12/12/19  1024   WBC Thousand/uL 5 21  --  5 20   HEMOGLOBIN g/dL 9 3* 9 5* 7 8*   HEMATOCRIT % 28 2* 28 3* 24 2*   PLATELETS Thousands/uL 163  --  151     Results from last 7 days   Lab Units 12/13/19  0443 12/12/19 0441 12/11/19 2016   SODIUM mmol/L 132* 136  --    POTASSIUM mmol/L 3 0* 3 5  --    CHLORIDE mmol/L 99* 101  --    CO2 mmol/L 29 30  --    CO2, I-STAT mmol/L  --   --  28   BUN mg/dL 4* 4*  --    CREATININE mg/dL 0 45* 0 64  --    GLUCOSE, ISTAT mg/dl  --   --  155*   CALCIUM mg/dL 8 4 7 7*  --              Results from last 7 days   Lab Units 12/11/19  2121   POC GLUCOSE mg/dl 163*       Review of Scheduled Meds:    Current Facility-Administered Medications:  acetaminophen 650 mg Oral Q6H PRN Aquilla Night, PA-C    aluminum-magnesium hydroxide-simethicone 30 mL Oral Q6H PRN Aquilla Night, PA-C    calcium carbonate 1,000 mg Oral Daily PRN [facility], PA-C    docusate sodium 100 mg Oral BID [facility], PA-C    enoxaparin 40 mg Subcutaneous Daily [facility], PA-C    gabapentin 100 mg Oral Q8H [facility], PA-C    iron sucrose 300 mg Intravenous Daily [facility], PA-C    lactated ringers 1 5 mL/kg/hr Intravenous Continuous [facility], PA-C Last Rate: Stopped (12/12/19 0540)   levothyroxine 25 mcg Oral Daily [facility], PA-C    LORazepam 2 mg Oral Q8H PRN [facility], PA-C    methocarbamol 750 mg Oral Q6H [address] [facility], PA-C    morphine injection 2 mg Intravenous Q2H PRN [facility], PA-C    ondansetron 4 mg Intravenous Q6H PRN [facility], PA-C    oxyCODONE 10 mg Oral Q4H PRN [facility], PA-C    oxyCODONE 5 mg Oral Q4H PRN [facility], PA-C    pantoprazole 40 mg Oral Daily [facility], PA-C    PARoxetine 60 mg Oral QAM [facility], PA-C    QUEtiapine 400 mg Oral HS [facility], PA-C    ziprasidone 80 mg Oral BID [facility], PA-C        Subjective/ HPI: Patient seen and examined  Patients overnight issues or events were reviewed with nursing or staff during rounds or morning huddle session  New or overnight issues include the following:     Pt tolerated blood transfusion without difficulty  Doing well; much more energy this AM    ROS:   A 10 point ROS was performed; negative except as noted above         Imaging:     No orders to display       *Labs /Radiology studiesReviewed  *Medications Reviewed and reconciled as needed  *Please refer to order section for additional ordered labs studies  *Case discussed with primary attending during morning huddle case rounds    Physical Examination:  Vitals:   Vitals:    12/12/19 2306 12/13/19 0304 12/13/19 0600 12/13/19 0703   BP: 119/70 115/64  120/68   BP Location:       Pulse: (!) 109 104  100   Resp: 19 20  18   Temp: 100 1 °F (37 8 °C) 98 7 °F (37 1 °C)  99 1 °F (37 3 °C)   TempSrc:       SpO2: 94% 92%  97%   Weight:   72 6 kg (160 lb)    Height:           General Appearance: no distress, conversive  HEENT: PERRLA, conjuctiva normal; oropharynx clear; mucous membranes moist;   Neck:  Supple, no lymphadenopathy or thyromegaly  Lungs: CTA, normal respiratory effort, no retractions, expiratory effort normal  CV: regular rate and rhythm , PMI normal   ABD: soft non tender, no masses , no hepatic or splenomegaly  EXT: DP pulses intact, no lymphadenopathy, no edema  Skin: normal turgor, normal texture, no rash; hip dressing in place  Psych: affect normal, mood normal  Neuro: AAOx3        The above physical exam was reviewed and updated as determined by my evaluation of the patient on 12/13/2019  Invasive Devices     Peripheral Intravenous Line            Peripheral IV 12/11/19 Left Wrist 1 day    Peripheral IV 12/11/19 Right Hand 1 day                   VTE Pharmacologic Prophylaxis: Enoxaparin (Lovenox)  Code Status: Level 1 - Full Code  Current Length of Stay: 2 day(s)      Total time spent:  30 minutes with more than 50% spent counseling/coordinating care  Counseling includes discussion with patient re: progress  and discussion with patient of his/her current medical state/information  Coordination of patient's care was performed in conjunction with primary service  Time invested included review of patient's labs, vitals, and management of their comorbidities with continued monitoring  In addition, this patient was discussed with medical team including physician and advanced extenders  The care of the patient was extensively discussed and appropriate treatment plan was formulated unique for this patient  ** Please Note:  voice to text software may have been used in the creation of this document   Although proof errors in transcription or interpretation are a potential of such software**

## 2019-12-13 NOTE — PHYSICAL THERAPY NOTE
PHYSICAL THERAPY Treatment NOTE    Patient Name: Bobby Grover  [id number] Date: 12/13/2019 12/13/19 1141   Pain Assessment   Pain Assessment 0-10   Pain Score Worst Possible Pain   Pain Type Acute pain   Pain Location Hip   Pain Orientation Right   Pain Descriptors Aching   Pain Frequency Constant/continuous   Pain Onset Ongoing   Patient's Stated Pain Goal No pain   Hospital Pain Intervention(s) Ambulation/increased activity;Repositioned   Response to Interventions Tolerated   Restrictions/Precautions   Weight Bearing Precautions Per Order Yes   RLE Weight Bearing Per Order WBAT   Other Precautions Cognitive; Chair Alarm;Multiple lines; Fall Risk;Pain; Impulsive   General   Additional Pertinent History Pt is a 60 yo F presenting to therapy s/p R anterior ELIZABETH  Family/Caregiver Present No   Cognition   Overall Cognitive Status Impaired   Attention Attends with cues to redirect   Orientation Level Oriented X4   Memory Within functional limits   Following Commands Follows one step commands with increased time or repetition   Comments Pt requires increased time to process cues  Pt presented with a flat affect throughout session but was cooperative throughout session  Bed Mobility   Additional Comments Pt was seated in recliner upon arrival of PT and was left seated in chair at the end of therapy session with all needs within reach  Transfers   Sit to Stand 4  Minimal assistance   Additional items Assist x 1;Verbal cues; Increased time required  (VC for hand placement and to push up from chair)   Stand to Sit 5  Supervision   Additional items Assist x 1; Increased time required;Verbal cues  (VC to reach back and control descent)   Toilet transfer 4  Minimal assistance   Additional items Assist x 1; Increased time required;Verbal cues;Raised toilet seat  (VC to reach back to control descent)   Ambulation/Elevation   Gait pattern Improper Weight shift; Antalgic;Narrow MIGUEL; Forward Flexion;Decreased foot clearance; Short stride; Excessively slow   Gait Assistance 4  Minimal assist   Additional items Assist x 1;Verbal cues  (VC for RW management and for proper weight shift)   Assistive Device Rolling walker   Distance 20 ft x 2   Balance   Static Sitting Fair   Dynamic Sitting Fair   Static Standing Poor +   Dynamic Standing Poor +   Ambulatory Poor +   Endurance Deficit   Endurance Deficit Yes   Endurance Deficit Description Pain   Activity Tolerance   Activity Tolerance Patient limited by pain; Patient limited by fatigue   Medical Staff Made Aware Spoke to OT   Nurse Made Aware Spoke to RN   Assessment   Prognosis Good   Problem List Decreased strength;Decreased endurance; Impaired balance;Decreased mobility;Pain;Orthopedic restrictions   Assessment Pt is a 62 yo F presenting to therapy s/p R anterior ELIZABETH  Pt reported the worst possible pain upon PT arrival and that she did not think that she will be able to walk  She was able to ambulate 20 ft x 1 to the bathroom, perform a toilet transfer, and ambulate 20 ft x 1 back to recliner with Min A x 1  Pt requires repeated VC for RW management and posture and also requires increased time to process VC  She was alert and cooperative throughout therapy session but continues to be limited by pain and activity tolerance  Pt is making progress towards goals since last session as seen in increase in ambulation distance  Pt would benefit from continued skilled therapy addressing mobility deficits and functional impairments in order to facilitate a safe return home and to prior level of function  DC recommendation is for post acute IP rehab pending medical clearance      Barriers to Discharge Inaccessible home environment;Decreased caregiver support   Goals   STG Expiration Date 12/22/19   PT Treatment Day 1   Plan   Treatment/Interventions ADL retraining;Functional transfer training;LE strengthening/ROM; Elevations; Therapeutic exercise; Endurance training;Bed mobility;Gait training;Spoke to nursing;OT   PT Frequency Twice a day   Recommendation   Recommendation Post acute IP rehab   Equipment Recommended Walker   PT - OK to Discharge Yes   Additional Comments DC to post acute IP rehab once medically cleared     Paty Craft, SPT, 12/13/2019

## 2019-12-13 NOTE — PHYSICAL THERAPY NOTE
Physical Therapy Progress Note      12/13/19 1500   Pain Assessment   Pain Assessment 0-10   Pain Score Worst Possible Pain   Pain Type Acute pain   Pain Location Hip   Pain Orientation Right   Hospital Pain Intervention(s) Ambulation/increased activity;Repositioned   Response to Interventions Tolerated   Restrictions/Precautions   Weight Bearing Precautions Per Order Yes   RLE Weight Bearing Per Order WBAT   Other Precautions Cognitive; Bed Alarm; Fall Risk;Pain   General   Chart Reviewed Yes   Response to Previous Treatment Patient with no complaints from previous session  Family/Caregiver Present No   Cognition   Overall Cognitive Status Impaired   Arousal/Participation Alert   Comments Patient is cooperative throughout session   Bed Mobility   Supine to Sit 4  Minimal assistance   Additional items Assist x 1;HOB elevated; Bedrails; Increased time required;Verbal cues   Sit to Supine 4  Minimal assistance   Additional items Assist x 1;Bedrails; Increased time required;Verbal cues;LE management   Transfers   Sit to Stand 4  Minimal assistance   Additional items Assist x 1; Increased time required;Verbal cues   Stand to Sit 5  Supervision   Additional items Verbal cues   Ambulation/Elevation   Gait pattern Excessively slow; Inconsistent abundio;Decreased foot clearance; Short stride   Gait Assistance 4  Minimal assist   Additional items Assist x 1;Verbal cues   Assistive Device Rolling walker   Distance 40 feet x 2   Balance   Static Sitting Fair +   Dynamic Sitting Fair   Static Standing Poor +   Dynamic Standing Poor +   Endurance Deficit   Endurance Deficit Yes   Endurance Deficit Description Pain   Activity Tolerance   Nurse Made Aware Appropriate to see per RN   Exercises   Hip Flexion Sitting;10 reps;AROM; Right   Hip Abduction Sitting;10 reps;AROM; Right   Knee AROM Long Arc Quad Sitting;10 reps;AROM; Right   Assessment   Prognosis Good   Problem List Decreased strength;Decreased endurance; Impaired balance;Decreased mobility; Decreased coordination;Decreased cognition; Impaired judgement;Decreased safety awareness;Orthopedic restrictions;Pain   Assessment Patient lying supine in bed, agreeable to participate in therapy  She was able to transfer and ambulate with improved mobility increased distances as noted with use of RW, min A to supervision  Patient requires encouragment to particiapte in therapy, as seems to perform better following  She was able to sit EOB to perform TE as noted  She would continue to benefit from skilled PT to maximize functional independence  Barriers to Discharge Inaccessible home environment;Decreased caregiver support   Goals   Patient Goals To get better   STG Expiration Date 12/22/19   PT Treatment Day 2   Plan   Treatment/Interventions LE strengthening/ROM; Therapeutic exercise; Endurance training;Bed mobility;Gait training;Spoke to nursing   Progress Slow progress, decreased activity tolerance   PT Frequency Twice a day   Recommendation   Recommendation Post acute IP rehab   Equipment Recommended Walker  (RW)   PT - OK to Discharge Yes       Moris Montelongo, JERROD

## 2019-12-13 NOTE — PLAN OF CARE
Problem: PHYSICAL THERAPY ADULT  Goal: Performs mobility at highest level of function for planned discharge setting  See evaluation for individualized goals  Outcome: Progressing  Note:   Prognosis: Good  Problem List: Decreased strength, Decreased endurance, Impaired balance, Decreased mobility, Pain, Orthopedic restrictions  Assessment: Pt is a 62 yo F presenting to therapy s/p R anterior ELIZABETH  Pt reported the worst possible pain upon PT arrival and that she did not think that she will be able to walk  She was able to ambulate 20 ft x 1 to the bathroom, perform a toilet transfer, and ambulate 20 ft x 1 back to recliner with Min A x 1  Pt requires repeated VC for RW management and posture and also requires increased time to process VC  She was alert and cooperative throughout therapy session but continues to be limited by pain and activity tolerance  Pt is making progress towards goals since last session as seen in increase in ambulation distance  Pt would benefit from continued skilled therapy addressing mobility deficits and functional impairments in order to facilitate a safe return home and to prior level of function  DC recommendation is for post acute IP rehab pending medical clearance  Barriers to Discharge: Inaccessible home environment, Decreased caregiver support     Recommendation: Post acute IP rehab     PT - OK to Discharge: Yes    See flowsheet documentation for full assessment

## 2019-12-14 LAB
ANION GAP SERPL CALCULATED.3IONS-SCNC: 3 MMOL/L (ref 4–13)
BUN SERPL-MCNC: 3 MG/DL (ref 5–25)
CALCIUM SERPL-MCNC: 8.4 MG/DL (ref 8.3–10.1)
CHLORIDE SERPL-SCNC: 97 MMOL/L (ref 100–108)
CO2 SERPL-SCNC: 31 MMOL/L (ref 21–32)
CREAT SERPL-MCNC: 0.45 MG/DL (ref 0.6–1.3)
ERYTHROCYTE [DISTWIDTH] IN BLOOD BY AUTOMATED COUNT: 21.7 % (ref 11.6–15.1)
GFR SERPL CREATININE-BSD FRML MDRD: 110 ML/MIN/1.73SQ M
GLUCOSE SERPL-MCNC: 89 MG/DL (ref 65–140)
HCT VFR BLD AUTO: 23.9 % (ref 34.8–46.1)
HCT VFR BLD AUTO: 29.2 % (ref 34.8–46.1)
HGB BLD-MCNC: 7.8 G/DL (ref 11.5–15.4)
HGB BLD-MCNC: 9.6 G/DL (ref 11.5–15.4)
MCH RBC QN AUTO: 27.5 PG (ref 26.8–34.3)
MCHC RBC AUTO-ENTMCNC: 32.6 G/DL (ref 31.4–37.4)
MCV RBC AUTO: 84 FL (ref 82–98)
PLATELET # BLD AUTO: 155 THOUSANDS/UL (ref 149–390)
PMV BLD AUTO: 8.7 FL (ref 8.9–12.7)
POTASSIUM SERPL-SCNC: 3.1 MMOL/L (ref 3.5–5.3)
RBC # BLD AUTO: 2.84 MILLION/UL (ref 3.81–5.12)
SODIUM SERPL-SCNC: 131 MMOL/L (ref 136–145)
WBC # BLD AUTO: 5.78 THOUSAND/UL (ref 4.31–10.16)

## 2019-12-14 PROCEDURE — 80048 BASIC METABOLIC PNL TOTAL CA: CPT | Performed by: PHYSICIAN ASSISTANT

## 2019-12-14 PROCEDURE — 99024 POSTOP FOLLOW-UP VISIT: CPT | Performed by: PHYSICIAN ASSISTANT

## 2019-12-14 PROCEDURE — 85018 HEMOGLOBIN: CPT | Performed by: PHYSICIAN ASSISTANT

## 2019-12-14 PROCEDURE — 30233N1 TRANSFUSION OF NONAUTOLOGOUS RED BLOOD CELLS INTO PERIPHERAL VEIN, PERCUTANEOUS APPROACH: ICD-10-PCS | Performed by: ORTHOPAEDIC SURGERY

## 2019-12-14 PROCEDURE — 85014 HEMATOCRIT: CPT | Performed by: PHYSICIAN ASSISTANT

## 2019-12-14 PROCEDURE — 97110 THERAPEUTIC EXERCISES: CPT

## 2019-12-14 PROCEDURE — 85027 COMPLETE CBC AUTOMATED: CPT | Performed by: PHYSICIAN ASSISTANT

## 2019-12-14 PROCEDURE — 97116 GAIT TRAINING THERAPY: CPT

## 2019-12-14 RX ORDER — POTASSIUM CHLORIDE 20 MEQ/1
40 TABLET, EXTENDED RELEASE ORAL 2 TIMES DAILY
Status: COMPLETED | OUTPATIENT
Start: 2019-12-14 | End: 2019-12-14

## 2019-12-14 RX ADMIN — LEVOTHYROXINE SODIUM 25 MCG: 25 TABLET ORAL at 06:25

## 2019-12-14 RX ADMIN — METHOCARBAMOL TABLETS 750 MG: 750 TABLET, COATED ORAL at 23:18

## 2019-12-14 RX ADMIN — METHOCARBAMOL TABLETS 750 MG: 750 TABLET, COATED ORAL at 17:48

## 2019-12-14 RX ADMIN — METHOCARBAMOL TABLETS 750 MG: 750 TABLET, COATED ORAL at 11:10

## 2019-12-14 RX ADMIN — ZIPRASIDONE HCL 80 MG: 40 CAPSULE ORAL at 17:48

## 2019-12-14 RX ADMIN — POTASSIUM CHLORIDE 40 MEQ: 1500 TABLET, EXTENDED RELEASE ORAL at 17:48

## 2019-12-14 RX ADMIN — OXYCODONE HYDROCHLORIDE 10 MG: 10 TABLET ORAL at 02:01

## 2019-12-14 RX ADMIN — ZIPRASIDONE HCL 80 MG: 40 CAPSULE ORAL at 08:32

## 2019-12-14 RX ADMIN — PAROXETINE 60 MG: 20 TABLET, FILM COATED ORAL at 08:33

## 2019-12-14 RX ADMIN — ONDANSETRON 4 MG: 2 INJECTION INTRAMUSCULAR; INTRAVENOUS at 08:03

## 2019-12-14 RX ADMIN — PANTOPRAZOLE SODIUM 40 MG: 40 TABLET, DELAYED RELEASE ORAL at 08:32

## 2019-12-14 RX ADMIN — QUETIAPINE FUMARATE 400 MG: 100 TABLET ORAL at 23:17

## 2019-12-14 RX ADMIN — DOCUSATE SODIUM 100 MG: 100 CAPSULE, LIQUID FILLED ORAL at 17:48

## 2019-12-14 RX ADMIN — OXYCODONE HYDROCHLORIDE 10 MG: 10 TABLET ORAL at 23:18

## 2019-12-14 RX ADMIN — GABAPENTIN 100 MG: 100 CAPSULE ORAL at 14:32

## 2019-12-14 RX ADMIN — POTASSIUM CHLORIDE 40 MEQ: 1500 TABLET, EXTENDED RELEASE ORAL at 09:10

## 2019-12-14 RX ADMIN — DOCUSATE SODIUM 100 MG: 100 CAPSULE, LIQUID FILLED ORAL at 08:33

## 2019-12-14 RX ADMIN — METHOCARBAMOL TABLETS 750 MG: 750 TABLET, COATED ORAL at 06:25

## 2019-12-14 RX ADMIN — GABAPENTIN 100 MG: 100 CAPSULE ORAL at 06:25

## 2019-12-14 RX ADMIN — ENOXAPARIN SODIUM 40 MG: 40 INJECTION SUBCUTANEOUS at 23:17

## 2019-12-14 RX ADMIN — OXYCODONE HYDROCHLORIDE 10 MG: 10 TABLET ORAL at 06:25

## 2019-12-14 RX ADMIN — LORAZEPAM 2 MG: 1 TABLET ORAL at 10:28

## 2019-12-14 RX ADMIN — CALCIUM CARBONATE (ANTACID) CHEW TAB 500 MG 1000 MG: 500 CHEW TAB at 08:20

## 2019-12-14 RX ADMIN — GABAPENTIN 100 MG: 100 CAPSULE ORAL at 23:18

## 2019-12-14 NOTE — PROGRESS NOTES
Progress Note - Orthopedics   Eric Evans 61 y o  female MRN: [id number]  Unit/Bed#: -01      Subjective:    61 y  o female POD #3 R anterior ELIZABETH  Patient did have episode of hypotension overnight  This improved with fluid bolus and pt states she feels better after this as well       Labs:  0   Lab Value Date/Time    HCT 23 9 (L) 12/14/2019 0441    HCT 28 2 (L) 12/13/2019 0443    HCT 28 3 (L) 12/12/2019 1806    HGB 7 8 (L) 12/14/2019 0441    HGB 9 3 (L) 12/13/2019 0443    HGB 9 5 (L) 12/12/2019 1806    INR 0 99 12/04/2019 1004    WBC 5 78 12/14/2019 0441    WBC 5 21 12/13/2019 0443    WBC 5 20 12/12/2019 1024    CRP 4 8 (H) 12/04/2019 1004       Meds:    Current Facility-Administered Medications:     acetaminophen (TYLENOL) tablet 650 mg, 650 mg, Oral, Q6H PRN, Meseret Flakes, PA-C    aluminum-magnesium hydroxide-simethicone (MYLANTA) 200-200-20 mg/5 mL oral suspension 30 mL, 30 mL, Oral, Q6H PRN, Meseret Flakes, PA-C    calcium carbonate (TUMS) chewable tablet 1,000 mg, 1,000 mg, Oral, Daily PRN, Meseret Flakes, PA-C, 1,000 mg at 12/13/19 1439    docusate sodium (COLACE) capsule 100 mg, 100 mg, Oral, BID, Meseret Flakes, PA-C, 100 mg at 12/13/19 1707    enoxaparin (LOVENOX) subcutaneous injection 40 mg, 40 mg, Subcutaneous, Daily, Meseret Flakes, PA-C, 40 mg at 12/13/19 2144    gabapentin (NEURONTIN) capsule 100 mg, 100 mg, Oral, Q8H, Meseret Flakes, PA-C, 100 mg at 12/14/19 8919    lactated ringers infusion, 1 5 mL/kg/hr, Intravenous, Continuous, Meseret Flakes, PA-C, Last Rate: 500 mL/hr at 12/13/19 2346, 6 887 mL/kg/hr at 12/13/19 2346    levothyroxine tablet 25 mcg, 25 mcg, Oral, Daily, Meseret Flakes, PA-C, 25 mcg at 12/14/19 7918    LORazepam (ATIVAN) tablet 2 mg, 2 mg, Oral, Q8H PRN, Meseret Flakes, PA-C, 2 mg at 12/13/19 0806    methocarbamol (ROBAXIN) tablet 750 mg, 750 mg, Oral, Q6H [address], Meseret Flakes, PA-C, 750 mg at 12/14/19 0625    ondansetron (ZOFRAN) injection 4 mg, 4 mg, Intravenous, Q6H PRN, Merla Jennifer, PA-C    oxyCODONE (ROXICODONE) immediate release tablet 10 mg, 10 mg, Oral, Q4H PRN, Merla Jennifer, PA-C, 10 mg at 12/14/19 2185    oxyCODONE (ROXICODONE) IR tablet 5 mg, 5 mg, Oral, Q4H PRN, Merla Jennifer, PA-C    pantoprazole (PROTONIX) EC tablet 40 mg, 40 mg, Oral, Daily, Merla Jennifer, PA-C, 40 mg at 12/13/19 0806    PARoxetine (PAXIL) tablet 60 mg, 60 mg, Oral, QAM, Merla Jennifer, PA-C, 60 mg at 12/13/19 0805    QUEtiapine (SEROquel) tablet 400 mg, 400 mg, Oral, HS, Merla Jennifer, PA-C, 400 mg at 12/13/19 2152    ziprasidone (GEODON) capsule 80 mg, 80 mg, Oral, BID, Merla Jennifer, PA-C, 80 mg at 12/13/19 1707    Blood Culture:   Lab Results   Component Value Date    BLOODCX No Growth After 5 Days  08/13/2018    BLOODCX Staphylococcus coagulase negative (A) 08/13/2018       Wound Culture:   No results found for: WOUNDCULT    Ins and Outs:  I/O last 24 hours: In: 4360 [P O :4360]  Out: 9010 [Urine:5700]          Physical:  Vitals:    12/14/19 0645   BP: 114/67   Pulse: (!) 107   Resp: 17   Temp: 99 1 °F (37 3 °C)   SpO2: 93%     Musculoskeletal: right Lower Extremity  · Dressing c/d/i  · Calf soft, compressible  · SILT s/sp/dp/t  · +fhl/ehl, +ankle dorsi/plantar flexion  · +PT pulse    _*_*_*_*_*_*_*_*_*_*_*_*_*_*_*_*_*_*_*_*_*_*_*_*_*_*_*_*_*_*_*_*_*_*_*_*_*_*_*_*_*    Assessment:    61 y  o female POD #3 R anterior ELIZABETH    Plan:  · WBAT RLE  · Will transfuse 1U pRBCs  · Continue med recs regarding low sodium and potassium  · PT/OT  · Pain control  · DVT ppx  · Patient noted to have acute blood loss anemia due to a drop in Hbg of > 2 0g from preop levels, will monitor vital signs and resuscitate with IV fluids as needed  · Dispo: Ortho will follow    Lydia Mckeon PA-C

## 2019-12-14 NOTE — PROGRESS NOTES
Internal Medicine Progress Note  Patient: Estevan Martinez  Age/sex: 61 y o  female  Medical Record #: [id number]      ASSESSMENT/PLAN: (Interval History)  Estevan Martinez is seen and examined and management for following issues:    R ELIZABETH 12/11/19  · Pain controlled  · Continue encourage incentive spirometry; monitor fever curve      Post operative blood loss anemia  · Did receive 2U PRBCs and getting one unit today  · Venofer given x 2      Hypotension  · Had IVF bolus overnight for hypotension = normotensive currently  · Cont IVF/blood transfusion     Hypothyroid  · Cont supplementation     Schizophrenia disorder  · Cont current medications   · Mood stable     Hypokalemia  · Will give Kdur 40 meq x 2 today  · BMP in AM    Hyponatremia  · Mild; will watch  · BMP AM    Tachycardia  · Mild; likely 2/2 anemia  · Will watch    Dispo  · For rehab = pending    The above assessment and plan was reviewed and updated as determined by my evaluation of the patient on 12/14/2019  Labs:   Results from last 7 days   Lab Units 12/14/19 0441 12/13/19 0443   WBC Thousand/uL 5 78 5 21   HEMOGLOBIN g/dL 7 8* 9 3*   HEMATOCRIT % 23 9* 28 2*   PLATELETS Thousands/uL 155 163     Results from last 7 days   Lab Units 12/14/19 0441 12/13/19 0443 12/11/19 2016   SODIUM mmol/L 131* 132*   < >  --    POTASSIUM mmol/L 3 1* 3 0*   < >  --    CHLORIDE mmol/L 97* 99*   < >  --    CO2 mmol/L 31 29   < >  --    CO2, I-STAT mmol/L  --   --   --  28   BUN mg/dL 3* 4*   < >  --    CREATININE mg/dL 0 45* 0 45*   < >  --    GLUCOSE, ISTAT mg/dl  --   --   --  155*   CALCIUM mg/dL 8 4 8 4   < >  --     < > = values in this interval not displayed               Results from last 7 days   Lab Units 12/11/19 2121   POC GLUCOSE mg/dl 163*       Review of Scheduled Meds:    Current Facility-Administered Medications:  acetaminophen 650 mg Oral Q6H PRN Klaus CORY Guerrero    aluminum-magnesium hydroxide-simethicone 30 mL Oral Q6H PRN Bolivar Night, PA-C    calcium carbonate 1,000 mg Oral Daily PRN Bolivar Night, PA-C    docusate sodium 100 mg Oral BID Bolivar Night, PA-C    enoxaparin 40 mg Subcutaneous Daily Bolivar Night, PA-C    gabapentin 100 mg Oral Q8H Bolivar Night, PA-C    lactated ringers 1 5 mL/kg/hr Intravenous Continuous Bolivar Night, PA-C Last Rate: 6 887 mL/kg/hr (12/13/19 2346)   levothyroxine 25 mcg Oral Daily Bolivar Night, PA-C    LORazepam 2 mg Oral Q8H PRN Bolivar Night, PA-C    methocarbamol 750 mg Oral Q6H [address] Bolivar Night, PA-C    ondansetron 4 mg Intravenous Q6H PRN Bolivar Night, PA-C    oxyCODONE 10 mg Oral Q4H PRN Bolivar Night, PA-C    oxyCODONE 5 mg Oral Q4H PRN Bolivar Night, PA-C    pantoprazole 40 mg Oral Daily Bolivar Night, PA-C    PARoxetine 60 mg Oral QAM Bolivar Night, PA-C    QUEtiapine 400 mg Oral HS Bolivar Night, PA-C    ziprasidone 80 mg Oral BID Bolivar Night, PA-C        Subjective/ HPI: Patients overnight issues or events were reviewed with nursing or staff during rounds or morning huddle session  Offers no complaints    ROS:   A 10 point ROS was performed; negative except as noted above         Imaging:     No orders to display       *Labs /Radiology studiesReviewed  *Medications Reviewed and reconciled as needed  *Please refer to order section for additional ordered labs studies  *Case discussed with primary attending during morning huddle case rounds    Physical Examination:  Vitals:   Vitals:    12/14/19 0621 12/14/19 0645 12/14/19 0744 12/14/19 0816   BP: 115/67 114/67 112/64 113/65   Pulse: (!) 110 (!) 107 102 100   Resp:  17 18 17   Temp:  99 1 °F (37 3 °C) 98 4 °F (36 9 °C) 99 °F (37 2 °C)   TempSrc:   Oral Oral   SpO2: 99% 93% 95%    Weight:       Height:           General Appearance: no distress, conversive  HEENT: PERRLA, conjuctiva normal; oropharynx clear; mucous membranes moist   Neck:  Supple, no JVD  Lungs: CTA, normal respiratory effort, no retractions, expiratory effort normal  CV: regular rate and rhythm; no rubs/murmurs/gallops, PMI normal   ABD: soft; NT/ND; +BS  EXT: mild right leg edema  Skin: normal turgor, normal texture, no rash  Psych: affect normal flat, mood normal  Neuro: AAOx3      The above physical exam was reviewed and updated as determined by my evaluation of the patient on 12/14/2019  Invasive Devices     Peripheral Intravenous Line            Peripheral IV 12/11/19 Right Hand 2 days                   VTE Pharmacologic Prophylaxis: Enoxaparin (Lovenox)  Code Status: Level 1 - Full Code  Current Length of Stay: 3 day(s)      Total time spent:  30 minutes with more than 50% spent counseling/coordinating care  Counseling includes discussion with patient re: progress  and discussion with patient of his/her current medical state/information  Coordination of patient's care was performed in conjunction with primary service  Time invested included review of patient's labs, vitals, and management of their comorbidities with continued monitoring  In addition, this patient was discussed with medical team including physician and advanced extenders  The care of the patient was extensively discussed and appropriate treatment plan was formulated unique for this patient  ** Please Note:  voice to text software may have been used in the creation of this document   Although proof errors in transcription or interpretation are a potential of such software**

## 2019-12-14 NOTE — PLAN OF CARE
Problem: PHYSICAL THERAPY ADULT  Goal: Performs mobility at highest level of function for planned discharge setting  See evaluation for individualized goals  12/14/2019 1326 by Ekta Lanier PTA  Outcome: Progressing  Note:   Prognosis: Good  Problem List: Decreased strength, Decreased endurance, Impaired balance, Decreased mobility, Decreased coordination, Decreased cognition, Impaired judgement, Decreased safety awareness, Orthopedic restrictions, Pain  Assessment: Pt found seate din bedside to start session  She was able to ambulate the same distance as the AM visit with good tolerance but does have a tendency to hold her breath  Cuing provided on proper breathing technique as well education on functional gait pattern with semi- compliance noted  Pt was able to perform stairs again this session but was unsafe while performing them due to decreased balance, weakness, and fatigue  She was able to ambulate back to the room where she performed all seated TE as well with increased fatigue present to end session  Overall pt was happy with her progress but still needs to improve in order to go home  Pt had all needs met and call bell in reach to end  Pt would benefit from continued PT in order to promote safe and functional mobility  Barriers to Discharge: Inaccessible home environment, Decreased caregiver support     Recommendation: Post acute IP rehab     PT - OK to Discharge: Yes    See flowsheet documentation for full assessment  12/14/2019 0919 by Ekta Lanier PTA  Outcome: Progressing  Note:   Prognosis: Good  Problem List: Decreased strength, Decreased endurance, Impaired balance, Decreased mobility, Decreased coordination, Decreased cognition, Impaired judgement, Decreased safety awareness, Orthopedic restrictions, Pain  Assessment: Pt was found seated in bedside chair to begin session  She was able to perform all xfers and mobility this session with (S) to min Ax1 needed   Pt ambulated into the hallway going increased distances compared to LV with no complaints throughout  She did require cuing to keep the RW closer to her body as well education for proper breathing technique  She was instructed on and performed stair training this session with fair tolerance, unable to perform with no HR  Pt still felt unstead at times and non confident with stairs at home  She ambulated back to the room where she performed seated TE as charted with fatigue present to end  She had all needs met and call bell in reach  Pt would benefit from continued PT in order to promote safe and functional mobility  Barriers to Discharge: Inaccessible home environment, Decreased caregiver support     Recommendation: Post acute IP rehab     PT - OK to Discharge: Yes    See flowsheet documentation for full assessment

## 2019-12-14 NOTE — PHYSICAL THERAPY NOTE
Physical Therapy Progress Note     19 0905   Pain Assessment   Pain Assessment 0-10   Pain Score Worst Possible Pain   Pain Type Acute pain   Pain Location Hip   Pain Orientation Right   Restrictions/Precautions   Weight Bearing Precautions Per Order Yes   RLE Weight Bearing Per Order WBAT   Other Precautions Cognitive; Bed Alarm; Fall Risk;Pain  (on blood IV)   General   Chart Reviewed Yes   Response to Previous Treatment Patient with no complaints from previous session  Family/Caregiver Present No   Cognition   Overall Cognitive Status Impaired   Arousal/Participation Alert   Attention Attends with cues to redirect   Orientation Level Oriented X4   Memory Within functional limits   Following Commands Follows one step commands with increased time or repetition   Comments Pt was identified by name and , agreeable to treatment  Bed Mobility   Additional Comments Pt found seated in bedside chair to start  Transfers   Sit to Stand 5  Supervision   Additional items Assist x 1; Armrests; Increased time required;Verbal cues   Stand to Sit 5  Supervision   Additional items Assist x 1; Armrests; Increased time required;Verbal cues   Stand pivot 4  Minimal assistance   Additional items Assist x 1; Increased time required;Verbal cues   Ambulation/Elevation   Gait pattern Excessively slow;Decreased foot clearance; Short stride; Inconsistent abundio   Gait Assistance 4  Minimal assist   Additional items Assist x 1;Verbal cues   Assistive Device Rolling walker   Distance 70'x2   Stair Management Assistance 4  Minimal assist   Additional items Assist x 1;Verbal cues; Tactile cues; Increased time required   Stair Management Technique Two rails; Step to pattern; Foreward   Number of Stairs 2   Balance   Static Sitting Fair +   Dynamic Sitting Fair   Static Standing Poor +   Dynamic Standing Poor +   Ambulatory Poor +   Endurance Deficit   Endurance Deficit Yes   Endurance Deficit Description pain   Activity Tolerance   Activity Tolerance Patient limited by pain; Patient limited by fatigue   Nurse Made Aware yes, ok to see   Exercises   Glute Sets Sitting;10 reps;AROM; Bilateral   Hip Flexion Sitting;20 reps;AROM; Bilateral   Knee AROM Long Arc Quad Sitting;20 reps;AROM; Bilateral   Ankle Pumps Sitting;20 reps;AROM; Bilateral   Assessment   Prognosis Good   Problem List Decreased strength;Decreased endurance; Impaired balance;Decreased mobility; Decreased coordination;Decreased cognition; Impaired judgement;Decreased safety awareness;Orthopedic restrictions;Pain   Assessment Pt was found seated in bedside chair to begin session  She was able to perform all xfers and mobility this session with (S) to min Ax1 needed  Pt ambulated into the hallway going increased distances compared to LV with no complaints throughout  She did require cuing to keep the RW closer to her body as well education for proper breathing technique  She was instructed on and performed stair training this session with fair tolerance, unable to perform with no HR  Pt still felt unstead at times and non confident with stairs at home  She ambulated back to the room where she performed seated TE as charted with fatigue present to end  She had all needs met and call bell in reach  Pt would benefit from continued PT in order to promote safe and functional mobility  Barriers to Discharge Inaccessible home environment;Decreased caregiver support   Goals   Patient Goals to go for a walk   STG Expiration Date 12/22/19   Short Term Goal #1 STG 1: Pt will perform transfers at a MI level to return to baseline of function  STG 2: Pt will ambulate 300ft with RW at a MI level to reduce the level of assistance needed upon d/c home  STG 3: Pt will negotiate 3 steps with HR at a MI level to ensure safety with activity when able to ambulate 150ft with RW without major gait abnormalities or LOB  STG 4: Pt will perform bed mobility at a I level to return to PLOF      PT Treatment Day 3   Plan Treatment/Interventions LE strengthening/ROM; Therapeutic exercise; Endurance training;Bed mobility;Gait training;Spoke to nursing   Progress Slow progress, decreased activity tolerance   PT Frequency Twice a day   Recommendation   Recommendation Post acute IP rehab   Equipment Recommended Walker  (RW)   PT - OK to Discharge Yes   Additional Comments to rehab when medically cleared     Omid Morrow, JERROD

## 2019-12-14 NOTE — PHYSICAL THERAPY NOTE
Physical Therapy Progress Note     19 3026   Pain Assessment   Pain Assessment 0-10   Pain Score 9   Pain Type Acute pain   Pain Location Hip   Pain Orientation Right   Restrictions/Precautions   Weight Bearing Precautions Per Order Yes   RLE Weight Bearing Per Order WBAT   Other Precautions Cognitive; Fall Risk;Pain;Telemetry   General   Chart Reviewed Yes   Response to Previous Treatment Patient with no complaints from previous session  Family/Caregiver Present No   Cognition   Overall Cognitive Status Impaired   Arousal/Participation Alert   Attention Attends with cues to redirect   Orientation Level Oriented X4   Memory Within functional limits   Following Commands Follows one step commands with increased time or repetition   Comments Pt was identified by name and , agreeable to treatment  Transfers   Sit to Stand 5  Supervision   Additional items Assist x 1; Armrests; Increased time required;Verbal cues   Stand to Sit 5  Supervision   Additional items Assist x 1; Armrests; Increased time required;Verbal cues   Stand pivot 4  Minimal assistance   Additional items Assist x 1; Increased time required;Verbal cues   Ambulation/Elevation   Gait pattern Excessively slow;Decreased foot clearance; Inconsistent abundio; Short stride   Gait Assistance 4  Minimal assist   Additional items Assist x 1;Verbal cues; Tactile cues   Assistive Device Rolling walker   Distance 70'x2   Stair Management Assistance 4  Minimal assist   Additional items Assist x 1;Verbal cues   Stair Management Technique Two rails; Step to pattern; Foreward   Number of Stairs 5   Balance   Static Sitting Fair +   Dynamic Sitting Fair   Static Standing Poor +   Dynamic Standing Poor +   Ambulatory Poor +   Endurance Deficit   Endurance Deficit Yes   Endurance Deficit Description pain, fatigue   Activity Tolerance   Activity Tolerance Patient limited by pain; Patient limited by fatigue   Nurse Made Aware yes, ok to see   Exercises   Hip Flexion Sitting;20 reps;AROM; Bilateral   Knee AROM Long Arc Quad Sitting;20 reps;AROM; Bilateral   Ankle Pumps Sitting;20 reps;AROM; Bilateral   Assessment   Prognosis Good   Problem List Decreased strength;Decreased endurance; Impaired balance;Decreased mobility; Decreased coordination;Decreased cognition; Impaired judgement;Decreased safety awareness;Orthopedic restrictions;Pain   Assessment Pt found seate din bedside to start session  She was able to ambulate the same distance as the AM visit with good tolerance but does have a tendency to hold her breath  Cuing provided on proper breathing technique as well education on functional gait pattern with semi- compliance noted  Pt was able to perform stairs again this session but was unsafe while performing them due to decreased balance, weakness, and fatigue  She was able to ambulate back to the room where she performed all seated TE as well with increased fatigue present to end session  Overall pt was happy with her progress but still needs to improve in order to go home  Pt had all needs met and call bell in reach to end  Pt would benefit from continued PT in order to promote safe and functional mobility  Barriers to Discharge Inaccessible home environment;Decreased caregiver support   Goals   Patient Goals to get through today's PT session   STG Expiration Date 12/22/19   Short Term Goal #1 STG 1: Pt will perform transfers at a MI level to return to baseline of function  STG 2: Pt will ambulate 300ft with RW at a MI level to reduce the level of assistance needed upon d/c home  STG 3: Pt will negotiate 3 steps with HR at a MI level to ensure safety with activity when able to ambulate 150ft with RW without major gait abnormalities or LOB  STG 4: Pt will perform bed mobility at a I level to return to PLOF  PT Treatment Day 4   Plan   Treatment/Interventions LE strengthening/ROM; Therapeutic exercise; Endurance training;Bed mobility;Gait training;Spoke to nursing   Progress Slow progress, decreased activity tolerance   PT Frequency Twice a day   Recommendation   Recommendation Post acute IP rehab   Equipment Recommended Walker  (RW)   PT - OK to Discharge Yes   Additional Comments to rehab when medically cleared     Naomi Demarco PTA

## 2019-12-14 NOTE — PLAN OF CARE
Problem: PHYSICAL THERAPY ADULT  Goal: Performs mobility at highest level of function for planned discharge setting  See evaluation for individualized goals  Outcome: Progressing  Note:   Prognosis: Good  Problem List: Decreased strength, Decreased endurance, Impaired balance, Decreased mobility, Decreased coordination, Decreased cognition, Impaired judgement, Decreased safety awareness, Orthopedic restrictions, Pain  Assessment: Pt was found seated in bedside chair to begin session  She was able to perform all xfers and mobility this session with (S) to min Ax1 needed  Pt ambulated into the hallway going increased distances compared to LV with no complaints throughout  She did require cuing to keep the RW closer to her body as well education for proper breathing technique  She was instructed on and performed stair training this session with fair tolerance, unable to perform with no HR  Pt still felt unstead at times and non confident with stairs at home  She ambulated back to the room where she performed seated TE as charted with fatigue present to end  She had all needs met and call bell in reach  Pt would benefit from continued PT in order to promote safe and functional mobility  Barriers to Discharge: Inaccessible home environment, Decreased caregiver support     Recommendation: Post acute IP rehab     PT - OK to Discharge: Yes    See flowsheet documentation for full assessment

## 2019-12-15 LAB
ABO GROUP BLD BPU: NORMAL
ANION GAP SERPL CALCULATED.3IONS-SCNC: 2 MMOL/L (ref 4–13)
BASOPHILS # BLD AUTO: 0.01 THOUSANDS/ΜL (ref 0–0.1)
BASOPHILS NFR BLD AUTO: 0 % (ref 0–1)
BPU ID: NORMAL
BUN SERPL-MCNC: 4 MG/DL (ref 5–25)
CALCIUM SERPL-MCNC: 8.5 MG/DL (ref 8.3–10.1)
CHLORIDE SERPL-SCNC: 99 MMOL/L (ref 100–108)
CO2 SERPL-SCNC: 33 MMOL/L (ref 21–32)
CREAT SERPL-MCNC: 0.53 MG/DL (ref 0.6–1.3)
CROSSMATCH: NORMAL
EOSINOPHIL # BLD AUTO: 0.03 THOUSAND/ΜL (ref 0–0.61)
EOSINOPHIL NFR BLD AUTO: 1 % (ref 0–6)
ERYTHROCYTE [DISTWIDTH] IN BLOOD BY AUTOMATED COUNT: 21.8 % (ref 11.6–15.1)
GFR SERPL CREATININE-BSD FRML MDRD: 104 ML/MIN/1.73SQ M
GLUCOSE SERPL-MCNC: 79 MG/DL (ref 65–140)
HCT VFR BLD AUTO: 25.7 % (ref 34.8–46.1)
HGB BLD-MCNC: 8.4 G/DL (ref 11.5–15.4)
IMM GRANULOCYTES # BLD AUTO: 0.03 THOUSAND/UL (ref 0–0.2)
IMM GRANULOCYTES NFR BLD AUTO: 1 % (ref 0–2)
LYMPHOCYTES # BLD AUTO: 0.61 THOUSANDS/ΜL (ref 0.6–4.47)
LYMPHOCYTES NFR BLD AUTO: 13 % (ref 14–44)
MCH RBC QN AUTO: 28.1 PG (ref 26.8–34.3)
MCHC RBC AUTO-ENTMCNC: 32.7 G/DL (ref 31.4–37.4)
MCV RBC AUTO: 86 FL (ref 82–98)
MONOCYTES # BLD AUTO: 0.55 THOUSAND/ΜL (ref 0.17–1.22)
MONOCYTES NFR BLD AUTO: 11 % (ref 4–12)
NEUTROPHILS # BLD AUTO: 3.63 THOUSANDS/ΜL (ref 1.85–7.62)
NEUTS SEG NFR BLD AUTO: 74 % (ref 43–75)
NRBC BLD AUTO-RTO: 0 /100 WBCS
PLATELET # BLD AUTO: 175 THOUSANDS/UL (ref 149–390)
PMV BLD AUTO: 8.6 FL (ref 8.9–12.7)
POTASSIUM SERPL-SCNC: 4 MMOL/L (ref 3.5–5.3)
RBC # BLD AUTO: 2.99 MILLION/UL (ref 3.81–5.12)
SODIUM SERPL-SCNC: 134 MMOL/L (ref 136–145)
UNIT DISPENSE STATUS: NORMAL
UNIT PRODUCT CODE: NORMAL
UNIT RH: NORMAL
WBC # BLD AUTO: 4.86 THOUSAND/UL (ref 4.31–10.16)

## 2019-12-15 PROCEDURE — 80048 BASIC METABOLIC PNL TOTAL CA: CPT | Performed by: NURSE PRACTITIONER

## 2019-12-15 PROCEDURE — 97110 THERAPEUTIC EXERCISES: CPT

## 2019-12-15 PROCEDURE — 97535 SELF CARE MNGMENT TRAINING: CPT

## 2019-12-15 PROCEDURE — 97116 GAIT TRAINING THERAPY: CPT

## 2019-12-15 PROCEDURE — 85025 COMPLETE CBC W/AUTO DIFF WBC: CPT | Performed by: PHYSICIAN ASSISTANT

## 2019-12-15 PROCEDURE — 99024 POSTOP FOLLOW-UP VISIT: CPT | Performed by: PHYSICIAN ASSISTANT

## 2019-12-15 RX ADMIN — METHOCARBAMOL TABLETS 750 MG: 750 TABLET, COATED ORAL at 05:07

## 2019-12-15 RX ADMIN — DOCUSATE SODIUM 100 MG: 100 CAPSULE, LIQUID FILLED ORAL at 09:28

## 2019-12-15 RX ADMIN — METHOCARBAMOL TABLETS 750 MG: 750 TABLET, COATED ORAL at 11:06

## 2019-12-15 RX ADMIN — QUETIAPINE FUMARATE 400 MG: 100 TABLET ORAL at 22:37

## 2019-12-15 RX ADMIN — ZIPRASIDONE HCL 80 MG: 40 CAPSULE ORAL at 17:23

## 2019-12-15 RX ADMIN — GABAPENTIN 100 MG: 100 CAPSULE ORAL at 22:38

## 2019-12-15 RX ADMIN — ACETAMINOPHEN 650 MG: 325 TABLET ORAL at 05:07

## 2019-12-15 RX ADMIN — METHOCARBAMOL TABLETS 750 MG: 750 TABLET, COATED ORAL at 17:23

## 2019-12-15 RX ADMIN — PAROXETINE 60 MG: 20 TABLET, FILM COATED ORAL at 09:28

## 2019-12-15 RX ADMIN — LEVOTHYROXINE SODIUM 25 MCG: 25 TABLET ORAL at 05:07

## 2019-12-15 RX ADMIN — ENOXAPARIN SODIUM 40 MG: 40 INJECTION SUBCUTANEOUS at 22:38

## 2019-12-15 RX ADMIN — PANTOPRAZOLE SODIUM 40 MG: 40 TABLET, DELAYED RELEASE ORAL at 09:28

## 2019-12-15 RX ADMIN — METHOCARBAMOL TABLETS 750 MG: 750 TABLET, COATED ORAL at 22:38

## 2019-12-15 RX ADMIN — GABAPENTIN 100 MG: 100 CAPSULE ORAL at 14:32

## 2019-12-15 RX ADMIN — ZIPRASIDONE HCL 80 MG: 40 CAPSULE ORAL at 09:28

## 2019-12-15 RX ADMIN — GABAPENTIN 100 MG: 100 CAPSULE ORAL at 05:07

## 2019-12-15 RX ADMIN — DOCUSATE SODIUM 100 MG: 100 CAPSULE, LIQUID FILLED ORAL at 17:23

## 2019-12-15 RX ADMIN — LORAZEPAM 2 MG: 1 TABLET ORAL at 05:07

## 2019-12-15 NOTE — PROGRESS NOTES
Progress Note - Orthopedics   Cooper Coreas 61 y o  female MRN: [id number]  Unit/Bed#: -01      Subjective:    61 y  o female POD #4 R anterior ELIZABETH  No acute events, no complaints  Pt doing well  Denies other constitutional symptoms      Labs:  0   Lab Value Date/Time    HCT 25 7 (L) 12/15/2019 0514    HCT 29 2 (L) 12/14/2019 1223    HCT 23 9 (L) 12/14/2019 0441    HGB 8 4 (L) 12/15/2019 0514    HGB 9 6 (L) 12/14/2019 1223    HGB 7 8 (L) 12/14/2019 0441    INR 0 99 12/04/2019 1004    WBC 4 86 12/15/2019 0514    WBC 5 78 12/14/2019 0441    WBC 5 21 12/13/2019 0443    CRP 4 8 (H) 12/04/2019 1004       Meds:    Current Facility-Administered Medications:     acetaminophen (TYLENOL) tablet 650 mg, 650 mg, Oral, Q6H PRN, Caprice Kalata, PA-C, 650 mg at 12/15/19 0507    aluminum-magnesium hydroxide-simethicone (MYLANTA) 200-200-20 mg/5 mL oral suspension 30 mL, 30 mL, Oral, Q6H PRN, Caprice Kalata, PA-C    calcium carbonate (TUMS) chewable tablet 1,000 mg, 1,000 mg, Oral, Daily PRN, Caprice Kalata, PA-C, 1,000 mg at 12/14/19 0820    docusate sodium (COLACE) capsule 100 mg, 100 mg, Oral, BID, Caprice Kalata, PA-C, 100 mg at 12/14/19 1748    enoxaparin (LOVENOX) subcutaneous injection 40 mg, 40 mg, Subcutaneous, Daily, Caprice Kalata, PA-C, 40 mg at 12/14/19 2317    gabapentin (NEURONTIN) capsule 100 mg, 100 mg, Oral, Q8H, Caprice Kalata, PA-C, 100 mg at 12/15/19 0507    lactated ringers infusion, 1 5 mL/kg/hr, Intravenous, Continuous, Caprice Kalata, PA-C, Last Rate: 500 mL/hr at 12/13/19 2346, 6 887 mL/kg/hr at 12/13/19 2346    levothyroxine tablet 25 mcg, 25 mcg, Oral, Daily, Caprice Kalata, PA-C, 25 mcg at 12/15/19 0507    LORazepam (ATIVAN) tablet 2 mg, 2 mg, Oral, Q8H PRN, Caprice Kalata, PA-C, 2 mg at 12/15/19 0507    methocarbamol (ROBAXIN) tablet 750 mg, 750 mg, Oral, Q6H [address], Caprice Kalata, PA-C, 750 mg at 12/15/19 0507    ondansetron (ZOFRAN) injection 4 mg, 4 mg, Intravenous, Q6H PRN, Peña Hatcher, PA-C, 4 mg at 12/14/19 0803    oxyCODONE (ROXICODONE) immediate release tablet 10 mg, 10 mg, Oral, Q4H PRN, Peña Mock, PA-C, 10 mg at 12/14/19 2318    oxyCODONE (ROXICODONE) IR tablet 5 mg, 5 mg, Oral, Q4H PRN, Peña Hatcher, PA-C    pantoprazole (PROTONIX) EC tablet 40 mg, 40 mg, Oral, Daily, Peña Mock, PA-C, 40 mg at 12/14/19 3380    PARoxetine (PAXIL) tablet 60 mg, 60 mg, Oral, QAM, Peña Mock, PA-C, 60 mg at 12/14/19 9729    QUEtiapine (SEROquel) tablet 400 mg, 400 mg, Oral, HS, Peña Hatcher, PA-C, 400 mg at 12/14/19 2317    ziprasidone (GEODON) capsule 80 mg, 80 mg, Oral, BID, Peña Mock, PA-C, 80 mg at 12/14/19 1748    Blood Culture:   Lab Results   Component Value Date    BLOODCX No Growth After 5 Days  08/13/2018    BLOODCX Staphylococcus coagulase negative (A) 08/13/2018       Wound Culture:   No results found for: WOUNDCULT    Ins and Outs:  I/O last 24 hours: In: 5879 2 [P O :5600; Blood:279 2]  Out: 2975 [Urine:2975]          Physical:  Vitals:    12/14/19 2247   BP: 146/85   Pulse: (!) 109   Resp: 18   Temp: 100 °F (37 8 °C)   SpO2: 93%     Musculoskeletal: right Lower Extremity  · Dressing c/d/i  · Calf is soft, compressible  · SILT s/s/sp/dp/t  · +fhl/ehl, +ankle dorsi/plantar flexion  · +PT pulse    _*_*_*_*_*_*_*_*_*_*_*_*_*_*_*_*_*_*_*_*_*_*_*_*_*_*_*_*_*_*_*_*_*_*_*_*_*_*_*_*_*    Assessment:    61 y  o female POD #4 R anterior ELIZABETH     Plan:  · WBAT RLE  · Continue to monitor vitals and for symptoms of anemia, hgb 8 4  · PT/OT  · Pain control  · DVT ppx  · Patient noted to have acute blood loss anemia due to a drop in Hbg of > 2 0g from preop levels, will monitor vital signs and resuscitate with IV fluids as needed  · Dispo: Ortho will follow    Macy Bence, PA-C

## 2019-12-15 NOTE — PROGRESS NOTES
Internal Medicine Progress Note  Patient: Danice Severe  Age/sex: 61 y o  female  Medical Record #: [id number]      ASSESSMENT/PLAN: (Interval History)  Danice Severe is seen and examined and management for following issues:    R ELIZABETH 12/11/19  · Pain controlled  · Continue encourage incentive spirometry; monitor fever curve      Post operative blood loss anemia  · Did receive 3U PRBCs incl one 12/14  · Venofer given x 2   · CBC in AM 12/16     Hypothyroid  · Cont supplementation     Schizophrenia disorder  · Cont current medications   · Mood stable     Hypokalemia  · Resolved after Kdur 40 meq x 2 12/14  · BMP AM 12/16     Hyponatremia  · Mild; will watch  · improved     Tachycardia  · Mild; likely 2/2 anemia/pain  · Will watch     Dispo  · For rehab = pending    The above assessment and plan was reviewed and updated as determined by my evaluation of the patient on 12/15/2019  Labs:   Results from last 7 days   Lab Units 12/15/19  0514 12/14/19  1223 12/14/19  0441   WBC Thousand/uL 4 86  --  5 78   HEMOGLOBIN g/dL 8 4* 9 6* 7 8*   HEMATOCRIT % 25 7* 29 2* 23 9*   PLATELETS Thousands/uL 175  --  155     Results from last 7 days   Lab Units 12/15/19  0514 12/14/19  0441  12/11/19 2016   SODIUM mmol/L 134* 131*   < >  --    POTASSIUM mmol/L 4 0 3 1*   < >  --    CHLORIDE mmol/L 99* 97*   < >  --    CO2 mmol/L 33* 31   < >  --    CO2, I-STAT mmol/L  --   --   --  28   BUN mg/dL 4* 3*   < >  --    CREATININE mg/dL 0 53* 0 45*   < >  --    GLUCOSE, ISTAT mg/dl  --   --   --  155*   CALCIUM mg/dL 8 5 8 4   < >  --     < > = values in this interval not displayed               Results from last 7 days   Lab Units 12/11/19  2121   POC GLUCOSE mg/dl 163*       Review of Scheduled Meds:    Current Facility-Administered Medications:  acetaminophen 650 mg Oral Q6H PRN Yordan LINDA Howell-WAYNE    aluminum-magnesium hydroxide-simethicone 30 mL Oral Q6H PRN Yordan CORY Howell    calcium carbonate 1,000 mg Oral Daily PRN Afshan Stanton, PA-C    docusate sodium 100 mg Oral BID Afshan Stanton, PA-C    enoxaparin 40 mg Subcutaneous Daily Afshan Stanton, PA-C    gabapentin 100 mg Oral Q8H Afshan Stanton, PA-C    lactated ringers 1 5 mL/kg/hr Intravenous Continuous Afshan Stanton, PA-C Last Rate: 6 887 mL/kg/hr (12/13/19 2346)   levothyroxine 25 mcg Oral Daily Afshan Stanton, PA-C    LORazepam 2 mg Oral Q8H PRN Afshan Stanton, PA-C    methocarbamol 750 mg Oral Q6H Lorena Netter, PA-C    ondansetron 4 mg Intravenous Q6H PRN Afshan Stanton, PA-C    oxyCODONE 10 mg Oral Q4H PRN Afshan Stanton, PA-C    oxyCODONE 5 mg Oral Q4H PRN Afshan Stanton, PA-C    pantoprazole 40 mg Oral Daily Afshan Stanton, PA-C    PARoxetine 60 mg Oral QAM Afshan Stanton, PA-C    QUEtiapine 400 mg Oral HS Afshan Stanton, PA-C    ziprasidone 80 mg Oral BID Afshan Stanton, PA-C        Subjective/ HPI: Patients overnight issues or events were reviewed with nursing or staff during rounds or morning huddle session  No new or overnight issues  ROS:   A 10 point ROS was performed; negative except as noted above         Imaging:     No orders to display       *Labs /Radiology studiesReviewed  *Medications Reviewed and reconciled as needed  *Please refer to order section for additional ordered labs studies  *Case discussed with primary attending during morning huddle case rounds    Physical Examination:  Vitals:   Vitals:    12/14/19 1507 12/14/19 2247 12/15/19 0600 12/15/19 0712   BP: 117/64 146/85  117/75   Pulse: 104 (!) 109  103   Resp: 17 18  18   Temp: 99 3 °F (37 4 °C) 100 °F (37 8 °C)  98 4 °F (36 9 °C)   TempSrc:       SpO2: 91% 93%  95%   Weight:   72 6 kg (160 lb)    Height:           General Appearance: no distress, conversive  HEENT: PERRLA, conjuctiva normal; oropharynx clear; mucous membranes moist   Neck:  Supple, no JVD; no pain with movement  Lungs: CTA, normal respiratory effort, no retractions, expiratory effort normal  CV: regular rate and rhythm; no rubs/murmurs/gallops, PMI normal   ABD: soft; ND/NT; +BS  EXT: some edema right leg  Skin: normal turgor, normal texture, no rashes  Psych: affect normal, mood normal  Neuro: AAOx3      The above physical exam was reviewed and updated as determined by my evaluation of the patient on 12/15/2019  Invasive Devices     Peripheral Intravenous Line            Peripheral IV 12/15/19 Left;Proximal;Ventral (anterior) Forearm less than 1 day                   VTE Pharmacologic Prophylaxis: Enoxaparin (Lovenox)  Code Status: Level 1 - Full Code  Current Length of Stay: 4 day(s)      Total time spent:  30 minutes with more than 50% spent counseling/coordinating care  Counseling includes discussion with patient re: progress  and discussion with patient of his/her current medical state/information  Coordination of patient's care was performed in conjunction with primary service  Time invested included review of patient's labs, vitals, and management of their comorbidities with continued monitoring  In addition, this patient was discussed with medical team including physician and advanced extenders  The care of the patient was extensively discussed and appropriate treatment plan was formulated unique for this patient  ** Please Note:  voice to text software may have been used in the creation of this document   Although proof errors in transcription or interpretation are a potential of such software**

## 2019-12-15 NOTE — OCCUPATIONAL THERAPY NOTE
OccupationalTherapy Progress Note     Patient Name: Junie Pina  QSREK'T Date: 12/15/2019  Problem List  Principal Problem:    Primary osteoarthritis of right hip          12/15/19 0825   Restrictions/Precautions   Weight Bearing Precautions Per Order Yes   RUE Weight Bearing Per Order WBAT   LUE Weight Bearing Per Order WBAT   RLE Weight Bearing Per Order WBAT   LLE Weight Bearing Per Order WBAT   Other Precautions Fall Risk;Pain   Lifestyle   Autonomy At baseline pt was completing all ADLs/IADLs IND, ambulating in household IND w/o AD (reports recent use of manual w/c in community 2' hip pain), (+) driving, recently stopped working (2 weeks ago) however wishes to return  Reciprocal Relationships supportive mother and neighbor  Pt reports no other social support   Service to Others worked PT as    Intrinsic Gratification pt enjoys working & wishes to return   Pain Assessment   Pain Assessment 0-10   Pain Score 3   Pain Type Acute pain   Pain Location Hip   Pain Orientation Right   Hospital Pain Intervention(s) Repositioned; Ambulation/increased activity; Emotional support   ADL   Eating Assistance 7  Independent   Grooming Assistance 5  Supervision/Setup   UB Bathing Assistance 5  Supervision/Setup   LB Bathing Assistance 4  Minimal Assistance   UB Dressing Assistance 5  Supervision/Setup   LB Dressing Assistance 4  Minimal Assistance   Toileting Assistance  5  Supervision/Setup   Bed Mobility   Additional Comments oob in chair    Transfers   Sit to Stand 5  Supervision   Additional items Verbal cues; Increased time required   Stand to Sit 5  Supervision   Additional items Increased time required;Verbal cues   Stand pivot 5  Supervision   Functional Mobility   Functional Mobility   (cga)   Additional items Rolling walker   Cognition   Arousal/Participation Alert; Cooperative   Attention Attends with cues to redirect   Orientation Level Oriented X4   Memory Decreased recall of precautions   Following Commands Follows one step commands without difficulty   Comments pt initially requesting therapist to retrieve item from floor as she "is not allowed to bend forward" when asked pt to clarify pt recited posterior THR prec - educated pt re: sx procedure (anterior THR) and that she does not need to follow posterior THR prec and is permitted to forward flex to reach to feet  - pt verbalized understanding    Activity Tolerance   Activity Tolerance Patient limited by fatigue;Treatment limited secondary to medical complications (Comment)   Assessment   Assessment pt seen for OT session focusing on LB adls and functional mobility/transfers and safety - pt requires min a to leora pants over feet and cga for dynamic balance in stance to pull over hips - sba for transfers with cues t/o for safe hand placement - fair overall tolerance to activity - OT to continue to follow to address goals as stated on initial eval    Plan   Treatment Interventions ADL retraining;Functional transfer training; Endurance training;Patient/family training;Equipment evaluation/education; Compensatory technique education; Activityengagement   Goal Expiration Date 12/26/19   OT Treatment Day 2   OT Frequency 3-5x/wk   Barthel Index   Feeding 10   Bathing 0   Grooming Score 5   Dressing Score 5   Bladder Score 10   Bowels Score 10   Toilet Use Score 5   Transfers (Bed/Chair) Score 10   Mobility (Level Surface) Score 0   Stairs Score 5   Barthel Index Score 60     [place], [place]

## 2019-12-15 NOTE — PLAN OF CARE
Problem: PHYSICAL THERAPY ADULT  Goal: Performs mobility at highest level of function for planned discharge setting  See evaluation for individualized goals  Outcome: Progressing  Note:   Prognosis: Good  Problem List: Decreased strength, Decreased range of motion, Decreased endurance, Impaired balance, Decreased mobility, Decreased safety awareness, Decreased cognition  Assessment: Pt resting OOB in chair at time of PT treatment session  Pt reports feeling " alright, but a little tired" however is willing to participate in PT treatment session  Pt able to perform all transfers this session with S which is slightly improved compared to pervious session, however, increased time as well as cues for hand placement and safety required  Pt able to tolerate increased ambulation distances this session with min A x 1 and the use of a RW, however distances remain limited secondary to fatigue and weakness  Pt noted to have step through gait pattern with forward flexed posture  No gross LOB noted  Pt able to ascend and descend 2 steps today with min A  Pt able to tolerate and perform all therex without any increase in complaints  VC and TC needed for proper form and pacing  Pt assisted back into chair at conclusion of PT session with all needs within reach  PT will continue to follow  D/C recommendation when medically cleared is rehab  Barriers to Discharge: Inaccessible home environment     Recommendation: Post acute IP rehab     PT - OK to Discharge: Yes(to rehab when medically cleared )    See flowsheet documentation for full assessment

## 2019-12-15 NOTE — PHYSICAL THERAPY NOTE
PHYSICAL THERAPY NOTE          Patient Name: Keri Vivar  BBQPA'H Date: 12/15/2019     12/15/19 4187   Pain Assessment   Pain Assessment No/denies pain   Restrictions/Precautions   Weight Bearing Precautions Per Order Yes   RLE Weight Bearing Per Order WBAT   Other Precautions Fall Risk;Multiple lines;Cognitive   General   Chart Reviewed Yes   Response to Previous Treatment Patient with no complaints from previous session  Family/Caregiver Present No   Cognition   Overall Cognitive Status Impaired   Arousal/Participation Alert   Attention Attends with cues to redirect   Orientation Level Oriented X4   Memory Within functional limits   Following Commands Follows one step commands without difficulty   Subjective   Subjective Pt willing to participate in PT treatment session    Bed Mobility   Additional Comments NA, Pt seated OOB in chair at time of PT treatment session    Transfers   Sit to Stand 5  Supervision   Additional items Increased time required;Verbal cues   Stand to Sit 5  Supervision   Additional items Increased time required;Verbal cues   Additional Comments VC and TC needed for hand placement and safety    Ambulation/Elevation   Gait pattern Excessively slow; Short stride; Foward flexed; Inconsistent abundio   Gait Assistance 4  Minimal assist   Assistive Device Rolling walker   Distance 75ft x 2    Stair Management Assistance 4  Minimal assist   Additional items Assist x 1   Stair Management Technique Two rails; Nonreciprocal   Number of Stairs 3   Balance   Static Sitting Fair +   Static Standing Fair -   Ambulatory Poor +   Endurance Deficit   Endurance Deficit Yes   Endurance Deficit Description fatigue and weakness    Activity Tolerance   Activity Tolerance Patient limited by fatigue   Nurse Made Aware Pt appropriate to be seen and mobilize per nsg    Exercises   THR Sitting;10 reps;AROM; Bilateral  (x 2 sets ) Assessment   Prognosis Good   Problem List Decreased strength;Decreased range of motion;Decreased endurance; Impaired balance;Decreased mobility; Decreased safety awareness;Decreased cognition   Assessment Pt resting OOB in chair at time of PT treatment session  Pt reports feeling " alright, but a little tired" however is willing to participate in PT treatment session  Pt able to perform all transfers this session with S which is slightly improved compared to pervious session, however, increased time as well as cues for hand placement and safety required  Pt able to tolerate increased ambulation distances this session with min A x 1 and the use of a RW, however distances remain limited secondary to fatigue and weakness  Pt noted to have step through gait pattern with forward flexed posture  No gross LOB noted  Pt able to ascend and descend 2 steps today with min A  Pt able to tolerate and perform all therex without any increase in complaints  VC and TC needed for proper form and pacing  Pt assisted back into chair at conclusion of PT session with all needs within reach  PT will continue to follow  D/C recommendation when medically cleared is rehab   Barriers to Discharge Inaccessible home environment   Goals   Patient Goals " to get up and walk"   [facility] Expiration Date 12/22/19   PT Treatment Day 5   Plan   Treatment/Interventions Functional transfer training;LE strengthening/ROM; Elevations; Therapeutic exercise; Endurance training;Patient/family training;Equipment eval/education; Bed mobility;Gait training;Spoke to nursing;OT   Progress Slow progress, decreased activity tolerance   PT Frequency 7x/wk   Recommendation   Recommendation Post acute IP rehab   Equipment Recommended Walker  (RW)   PT - OK to Discharge Yes  (to rehab when medically cleared )   Fela Montez, PT

## 2019-12-15 NOTE — RESTORATIVE TECHNICIAN NOTE
Restorative Specialist Mobility Note       Activity: Ambulate in solorio(Pt took one seated rest break during ambulation )     Assistive Device: Front wheel walker        Repositioned: Up in chair, Sitting              Anti-Embolism Device On:  Bilateral, Sequential compression devices, below knee

## 2019-12-16 LAB
ANION GAP SERPL CALCULATED.3IONS-SCNC: 3 MMOL/L (ref 4–13)
BASOPHILS # BLD AUTO: 0.02 THOUSANDS/ΜL (ref 0–0.1)
BASOPHILS NFR BLD AUTO: 0 % (ref 0–1)
BUN SERPL-MCNC: 6 MG/DL (ref 5–25)
CALCIUM SERPL-MCNC: 8.9 MG/DL (ref 8.3–10.1)
CHLORIDE SERPL-SCNC: 102 MMOL/L (ref 100–108)
CO2 SERPL-SCNC: 32 MMOL/L (ref 21–32)
CREAT SERPL-MCNC: 0.56 MG/DL (ref 0.6–1.3)
EOSINOPHIL # BLD AUTO: 0.06 THOUSAND/ΜL (ref 0–0.61)
EOSINOPHIL NFR BLD AUTO: 1 % (ref 0–6)
ERYTHROCYTE [DISTWIDTH] IN BLOOD BY AUTOMATED COUNT: 22.3 % (ref 11.6–15.1)
GFR SERPL CREATININE-BSD FRML MDRD: 102 ML/MIN/1.73SQ M
GLUCOSE SERPL-MCNC: 101 MG/DL (ref 65–140)
HCT VFR BLD AUTO: 27.2 % (ref 34.8–46.1)
HGB BLD-MCNC: 8.8 G/DL (ref 11.5–15.4)
IMM GRANULOCYTES # BLD AUTO: 0.06 THOUSAND/UL (ref 0–0.2)
IMM GRANULOCYTES NFR BLD AUTO: 1 % (ref 0–2)
LYMPHOCYTES # BLD AUTO: 0.49 THOUSANDS/ΜL (ref 0.6–4.47)
LYMPHOCYTES NFR BLD AUTO: 10 % (ref 14–44)
MCH RBC QN AUTO: 28.2 PG (ref 26.8–34.3)
MCHC RBC AUTO-ENTMCNC: 32.4 G/DL (ref 31.4–37.4)
MCV RBC AUTO: 87 FL (ref 82–98)
MONOCYTES # BLD AUTO: 0.55 THOUSAND/ΜL (ref 0.17–1.22)
MONOCYTES NFR BLD AUTO: 11 % (ref 4–12)
NEUTROPHILS # BLD AUTO: 3.63 THOUSANDS/ΜL (ref 1.85–7.62)
NEUTS SEG NFR BLD AUTO: 77 % (ref 43–75)
NRBC BLD AUTO-RTO: 0 /100 WBCS
PLATELET # BLD AUTO: 224 THOUSANDS/UL (ref 149–390)
PMV BLD AUTO: 8.5 FL (ref 8.9–12.7)
POTASSIUM SERPL-SCNC: 3.9 MMOL/L (ref 3.5–5.3)
RBC # BLD AUTO: 3.12 MILLION/UL (ref 3.81–5.12)
SODIUM SERPL-SCNC: 137 MMOL/L (ref 136–145)
WBC # BLD AUTO: 4.81 THOUSAND/UL (ref 4.31–10.16)

## 2019-12-16 PROCEDURE — 85025 COMPLETE CBC W/AUTO DIFF WBC: CPT | Performed by: PHYSICIAN ASSISTANT

## 2019-12-16 PROCEDURE — 80048 BASIC METABOLIC PNL TOTAL CA: CPT | Performed by: PHYSICIAN ASSISTANT

## 2019-12-16 PROCEDURE — 99232 SBSQ HOSP IP/OBS MODERATE 35: CPT | Performed by: PSYCHIATRY & NEUROLOGY

## 2019-12-16 PROCEDURE — 97116 GAIT TRAINING THERAPY: CPT

## 2019-12-16 PROCEDURE — 99024 POSTOP FOLLOW-UP VISIT: CPT | Performed by: PHYSICIAN ASSISTANT

## 2019-12-16 RX ADMIN — OXYCODONE HYDROCHLORIDE 10 MG: 10 TABLET ORAL at 05:28

## 2019-12-16 RX ADMIN — DOCUSATE SODIUM 100 MG: 100 CAPSULE, LIQUID FILLED ORAL at 17:17

## 2019-12-16 RX ADMIN — OXYCODONE HYDROCHLORIDE 10 MG: 10 TABLET ORAL at 15:13

## 2019-12-16 RX ADMIN — PANTOPRAZOLE SODIUM 40 MG: 40 TABLET, DELAYED RELEASE ORAL at 08:11

## 2019-12-16 RX ADMIN — GABAPENTIN 100 MG: 100 CAPSULE ORAL at 15:13

## 2019-12-16 RX ADMIN — METHOCARBAMOL TABLETS 750 MG: 750 TABLET, COATED ORAL at 05:28

## 2019-12-16 RX ADMIN — GABAPENTIN 100 MG: 100 CAPSULE ORAL at 05:28

## 2019-12-16 RX ADMIN — QUETIAPINE FUMARATE 400 MG: 100 TABLET ORAL at 21:17

## 2019-12-16 RX ADMIN — OXYCODONE HYDROCHLORIDE 10 MG: 10 TABLET ORAL at 09:44

## 2019-12-16 RX ADMIN — METHOCARBAMOL TABLETS 750 MG: 750 TABLET, COATED ORAL at 12:09

## 2019-12-16 RX ADMIN — ZIPRASIDONE HCL 80 MG: 40 CAPSULE ORAL at 08:12

## 2019-12-16 RX ADMIN — PAROXETINE 60 MG: 20 TABLET, FILM COATED ORAL at 08:11

## 2019-12-16 RX ADMIN — METHOCARBAMOL TABLETS 750 MG: 750 TABLET, COATED ORAL at 17:17

## 2019-12-16 RX ADMIN — LEVOTHYROXINE SODIUM 25 MCG: 25 TABLET ORAL at 05:28

## 2019-12-16 RX ADMIN — LORAZEPAM 2 MG: 1 TABLET ORAL at 09:44

## 2019-12-16 RX ADMIN — ZIPRASIDONE HCL 80 MG: 40 CAPSULE ORAL at 17:17

## 2019-12-16 NOTE — CONSULTS
Consultation - 6 [facility] ANNAMARIA Plascencia 61 y o  female MRN: [id number]  Unit/Bed#: -01 Encounter: [id number]      Chief Complaint:  I am doing okay    History of Present Illness   Physician Requesting Consult: Karen Colindres MD  Reason for Consult / Principal Problem:  Schizoaffective disorder, depressive type, denies anxiety disorder  Patient needs psychiatric evaluation for possible placement    Giselle Maza is a 61 y o  female with hypothyroidism, schizoaffective disorder presents for right hip replacement  Patient have inpatient psych admission in 2018 and needs psychiatric clearance to go to rehabilitation  She states that she is doing okay  She follow with primary doctor  She takes medication as prescribed  She states that she has good appetite and good sleep  She denies any other issues  She denies suicidal thoughts plans or intent, she does not have any psychotic symptoms at this moment  Psychiatric Review Of Systems:  sleep: no  appetite changes: no  weight changes: no  energy/anergy: no  interest/pleasure/anhedonia: no  somatic symptoms: no  anxiety/panic: no  rody: no  guilty/hopeless: no  self injurious behavior/risky behavior: no    Historical Information   Past Psychiatric History:   She had multiple inpatient psych admission as ordered [facility] and [facility]    Last admission 2018  Currently in treatment with primary physician  Past Suicide attempts:  He yes she cut her wrist  Past Violent behavior:  None  Past Psychiatric medication trial:  Paxil, Tofranil, risperidone, Seroquel, Zyprexa, Geodon, Ativan    Substance Abuse History:  She denies any drugs or alcohol history     I have assessed this patient for substance use within the past 12 months     History of IP/OP rehabilitation program:  None  Smoking history:  Never smoked  Family Psychiatric History:   Her mother had depression, no family history of substance abuse or history of suicide attempt    Social History  Education: post college graduate work or degree  Learning Disabilities: No  Marital history: single  Living arrangement, social support: She live with her mother  Occupational History:  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 on permanent disability  Functioning Relationships:  Limited support system    Other Pertinent History: No legal or  history    Traumatic History:   Abuse: None  Other Traumatic Events: None    Past Medical History:   Diagnosis Date    Anxiety     Back pain at L4-L5 level     Schreiber esophagus     Bulimia     Disease of thyroid gland     hypothyroid    GERD (gastroesophageal reflux disease)     Hyperlipidemia     Hypothyroidism     Leukopenia     Rheumatoid arthritis involving right hip (HCC)     Sciatica     Seizure (HCC)     due to medication mix up 8/2018 only one historically    Synovial cyst of lumbar spine     Vertigo     Weight gain        Medical Review Of Systems:  Review of Systems - Negative except hip pain, anxiety, dry mouth all other systems reviewed were negative    Meds/Allergies   current meds:   Current Facility-Administered Medications   Medication Dose Route Frequency    acetaminophen (TYLENOL) tablet 650 mg  650 mg Oral Q6H PRN    aluminum-magnesium hydroxide-simethicone (MYLANTA) 200-200-20 mg/5 mL oral suspension 30 mL  30 mL Oral Q6H PRN    calcium carbonate (TUMS) chewable tablet 1,000 mg  1,000 mg Oral Daily PRN    docusate sodium (COLACE) capsule 100 mg  100 mg Oral BID    enoxaparin (LOVENOX) subcutaneous injection 40 mg  40 mg Subcutaneous Daily    gabapentin (NEURONTIN) capsule 100 mg  100 mg Oral Q8H    lactated ringers infusion  1 5 mL/kg/hr Intravenous Continuous    levothyroxine tablet 25 mcg  25 mcg Oral Daily    LORazepam (ATIVAN) tablet 2 mg  2 mg Oral Q8H PRN    methocarbamol (ROBAXIN) tablet 750 mg  750 mg Oral Q6H [address]    ondansetron [facility]) injection 4 mg  4 mg Intravenous Q6H PRN    oxyCODONE (ROXICODONE) immediate release tablet 10 mg  10 mg Oral Q4H PRN    oxyCODONE (ROXICODONE) IR tablet 5 mg  5 mg Oral Q4H PRN    pantoprazole (PROTONIX) EC tablet 40 mg  40 mg Oral Daily    PARoxetine (PAXIL) tablet 60 mg  60 mg Oral QAM    QUEtiapine (SEROquel) tablet 400 mg  400 mg Oral HS    ziprasidone (GEODON) capsule 80 mg  80 mg Oral BID     Allergies   Allergen Reactions    Risperdal [Risperidone] Shortness Of Breath     Rapid heart beat, SOB    Zyprexa [Olanzapine] Shortness Of Breath     Rapid heartbeat    Latex Rash     Band aids, adhesives wears normal underwear, can eat bananas  USE PAPER TAPE       Objective   Vital signs in last 24 hours:  Temp:  [98 1 °F (36 7 °C)-99 °F (37 2 °C)] 98 1 °F (36 7 °C)  HR:  [] 100  Resp:  [17-18] 18  BP: ()/(52-84) 131/80      Intake/Output Summary (Last 24 hours) at 12/16/2019 1437  Last data filed at 12/16/2019 1237  Gross per 24 hour   Intake 4260 ml   Output 8100 ml   Net -3840 ml       Mental Status Evaluation:  Appearance:  age appropriate and disheveled   Behavior:  normal   Speech:  normal pitch and normal volume   Mood:  anxious   Affect:  mood-congruent   Language: naming objects and repeating phrases   Thought Process:  goal directed   Associations: intact associations   Thought Content:  normal   Perceptual Disturbances: None   Risk Potential: She denies any suicidal or homicidal ideation, plan or intent   Sensorium:  person, place, time/date, situation, day of week and month of year   Memory:  recent and remote memory grossly intact   Cognition:  grossly intact   Consciousness:  alert and awake    Attention: attention span and concentration were age appropriate   Intellect: within normal limits   Fund of Knowledge: awareness of current events: Good, past history: Good and vocabulary: Good   Insight:  good   Judgment: good   Muscle Strength and Tone: Within normal limits Gait/Station: Unable to assess patient is in a chair   Motor Activity: no abnormal movements     Lab Results:    I have personally reviewed all pertinent laboratory/tests results  Labs in last 72 hours:   Recent Labs     12/16/19  0511   WBC 4 81   RBC 3 12*   HGB 8 8*   HCT 27 2*      RDW 22 3*   NEUTROABS 3 63   SODIUM 137   K 3 9      CO2 32   BUN 6   CREATININE 0 56*   GLUC 101   CALCIUM 8 9       Code Status: )Level 1 - Full Code    Assessment/Plan     Assessment:  Junie Pina is a 61 y o  female with schizoaffective disorder, anxiety and hyper post thyroid this most presented to the hospital for right hip replacement  She had prior psych admission needs psychiatric clearance  At this moment patient have been feeling anxious, she denies any suicidal homicidal ideation plan or intent, she denies any psychotic symptoms  She is psychiatrically cleared to go to inpatient rehabilitation  Diagnosis:  Schizoaffective disorder depressed type  Generalized anxiety disorder  Plan:   Continue medical management  Continue current psychotropic medication  No other intervention at this moment  Discussed with primary team  Patient is psychiatrically cleared to go to inpatient rehabilitation  I will sign off  Risks, benefits and possible side effects of Medications:   Risks, benefits, and possible side effects of medications explained to patient and patient verbalizes understanding             Rebecca Saldana MD

## 2019-12-16 NOTE — PROGRESS NOTES
Internal Medicine Progress Note  Patient: Cuauhtemoc Khanna  Age/sex: 61 y o  female  Medical Record #: [id number]      ASSESSMENT/PLAN: (Interval History)  Cuauhtemoc Khanna is seen and examined and management for following issues:    R ELIZABETH 12/11/19  · Pain increased while walking per patient; will d/w ortho  · Continue encourage incentive spirometry; monitor fever curve      Post operative blood loss anemia  · Did receive 3U PRBCs incl one 12/14  · Venofer given x 2   · CBC reviewed hgb stable     Hypothyroid  · Cont supplementation     Schizophrenia disorder  · Cont current medications   · Mood stable     Hypokalemia  · Resolved after Kdur 40 meq x 2 12/14  · BMP reviewed     Hyponatremia  · resolved        Dispo  · For rehab = pending      The above assessment and plan was reviewed and updated as determined by my evaluation of the patient on 12/16/2019  Labs:   Results from last 7 days   Lab Units 12/16/19  0511 12/15/19  0514   WBC Thousand/uL 4 81 4 86   HEMOGLOBIN g/dL 8 8* 8 4*   HEMATOCRIT % 27 2* 25 7*   PLATELETS Thousands/uL 224 175     Results from last 7 days   Lab Units 12/16/19  0511 12/15/19  0514  12/11/19 2016   SODIUM mmol/L 137 134*   < >  --    POTASSIUM mmol/L 3 9 4 0   < >  --    CHLORIDE mmol/L 102 99*   < >  --    CO2 mmol/L 32 33*   < >  --    CO2, I-STAT mmol/L  --   --   --  28   BUN mg/dL 6 4*   < >  --    CREATININE mg/dL 0 56* 0 53*   < >  --    GLUCOSE, ISTAT mg/dl  --   --   --  155*   CALCIUM mg/dL 8 9 8 5   < >  --     < > = values in this interval not displayed               Results from last 7 days   Lab Units 12/11/19  2121   POC GLUCOSE mg/dl 163*       Review of Scheduled Meds:    Current Facility-Administered Medications:  acetaminophen 650 mg Oral Q6H PRN [place] Night, PA-C    aluminum-magnesium hydroxide-simethicone 30 mL Oral Q6H PRN [place] Night, PA-C    calcium carbonate 1,000 mg Oral Daily PRN [place] Night, PA-C    docusate sodium 100 mg Oral BID Merla Jennifer, PA-C    enoxaparin 40 mg Subcutaneous Daily Merla Jennifer, PA-C    gabapentin 100 mg Oral Q8H Merla Jennifer, PA-C    lactated ringers 1 5 mL/kg/hr Intravenous Continuous Merla Jennifer, PA-C Last Rate: 6 887 mL/kg/hr (12/13/19 2346)   levothyroxine 25 mcg Oral Daily Merla Jennifer, PA-C    LORazepam 2 mg Oral Q8H PRN Merla Jennifer, PA-C    methocarbamol 750 mg Oral Q6H [address] Merla Jennifer, PA-C    ondansetron 4 mg Intravenous Q6H PRN Merla Jennifer, PA-C    oxyCODONE 10 mg Oral Q4H PRN Merla Jennifer, PA-C    oxyCODONE 5 mg Oral Q4H PRN Merla Jennifer, PA-C    pantoprazole 40 mg Oral Daily Merla Jennifer, PA-C    PARoxetine 60 mg Oral QAM Merla Jennifer, PA-C    QUEtiapine 400 mg Oral HS Merla Jennifer, PA-C    ziprasidone 80 mg Oral BID Merla Jennifer, PA-C        Subjective/ HPI: Patient seen and examined  Patients overnight issues or events were reviewed with nursing or staff during rounds or morning huddle session  New or overnight issues include the following:     Pt progressing  ROS:   A 10 point ROS was performed; negative except as noted above         Imaging:     No orders to display       *Labs /Radiology studiesReviewed  *Medications Reviewed and reconciled as needed  *Please refer to order section for additional ordered labs studies  *Case discussed with primary attending during morning huddle case rounds    Physical Examination:  Vitals:   Vitals:    12/15/19 1514 12/15/19 2329 12/16/19 0526 12/16/19 0716   BP: 127/79 98/52 128/84 131/80   Pulse: 95 85 (!) 108 100   Resp: 17 17  18   Temp: 99 °F (37 2 °C) 98 9 °F (37 2 °C)  98 1 °F (36 7 °C)   TempSrc:       SpO2: 98% 98% 91% 100%   Weight:       Height:           General Appearance: no distress, conversive  HEENT: PERRLA, conjuctiva normal; oropharynx clear; mucous membranes moist;   Neck:  Supple, no lymphadenopathy or thyromegaly  Lungs: CTA, normal respiratory effort, no retractions, expiratory effort normal  CV: regular rate and rhythm , PMI normal   ABD: soft non tender, no masses , no hepatic or splenomegaly  EXT: DP pulses intact, no lymphadenopathy, no edema  Skin: normal turgor, normal texture, no rash  Psych: affect normal, mood normal  Neuro: AAOx3      The above physical exam was reviewed and updated as determined by my evaluation of the patient on 12/16/2019  Invasive Devices     Peripheral Intravenous Line            Peripheral IV 12/15/19 Left;Proximal;Ventral (anterior) Forearm 1 day                   VTE Pharmacologic Prophylaxis: Enoxaparin (Lovenox)  Code Status: Level 1 - Full Code  Current Length of Stay: 5 day(s)      Total time spent:  30 minutes with more than 50% spent counseling/coordinating care  Counseling includes discussion with patient re: progress  and discussion with patient of his/her current medical state/information  Coordination of patient's care was performed in conjunction with primary service  Time invested included review of patient's labs, vitals, and management of their comorbidities with continued monitoring  In addition, this patient was discussed with medical team including physician and advanced extenders  The care of the patient was extensively discussed and appropriate treatment plan was formulated unique for this patient  ** Please Note:  voice to text software may have been used in the creation of this document   Although proof errors in transcription or interpretation are a potential of such software**

## 2019-12-16 NOTE — SOCIAL WORK
CM met with pt who stated that her first choice is [facility] and 2nd choice is [facility]  CM explained that [facility] will need to meet with pt prior to dc for evaluation  Await LC evaluation and letter of determination  [facility] is interested  11:20am Merline Boateng from [facility] left a message that she will evaluate pt tomorrow am  Ortho is entering psych consult  CM prepared paperwork packet for Ms Lawyer Evans and placed in pt's paper chart

## 2019-12-16 NOTE — PROGRESS NOTES
Pastoral Care Progress Note    2019  Patient: Bob Dakin : 1960  Admission Date & Time: 2019 1122  MRN: [id number] [facility]: [id number]                     Chaplaincy Interventions Utilized:   Empowerment: Encouraged self-care and Provided chaplaincy education    Exploration: Explored relational needs & resources, Explored spiritual needs & resources and Facilitated story telling    Collaboration:     Relationship Building: Cultivated a relationship of care and support and Listened empathically    Ritual:     Chaplaincy Outcomes Achieved:  Expressed gratitude, Identified meaningful connections and Improved communication    Spiritual Coping Strategies Utilized:   Spiritual comfort, Spiritual meaning and Spiritual community       [address] [employer]   Current [employer] Involvement Patient not active with [facility]   Spiritual Beliefs/Perceptions   Support Systems Parent   Stress Factors   Patient Stress Factors Health changes; Lack of caregivers   Coping Responses   Patient Coping Accepting;Open/discussion; Sadness   Plan of Care   Assessment Completed by: Unit visit

## 2019-12-16 NOTE — PROGRESS NOTES
Progress Note - Orthopedics   Dontrell Desiree 61 y o  female MRN: [id number]  Unit/Bed#: -01 Encounter: [id number]    Assessment:  Hospital day 6,  postop day 5, right total hip replacement anterior approach  ABLA    Plan:  Anterior right total hip replacement PT protocol  Continue to follow up hemodynamics  Discharge planning  DVT prophylaxis    Weight bearing:  Weight-bearing as tolerated right lower extremity    VTE Pharmacologic Prophylaxis: Enoxaparin (Lovenox)  VTE Mechanical Prophylaxis: sequential compression device    Subjective:  Adult female 61years of age who takes care of her 80-year-old mother  She is now 5 days postop from anterior total hip and is undergoing discharge planning due to the above  She continues to have significant pain from the anterior thigh and hip she finds that this is far too painful for her to actively participate in PT significantly  Vitals: Blood pressure 128/84, pulse (!) 108, temperature 98 9 °F (37 2 °C), resp  rate 17, height 5' 3 5" (1 613 m), weight 72 6 kg (160 lb), SpO2 91 %  ,Body mass index is 27 9 kg/m²  Intake/Output Summary (Last 24 hours) at 12/16/2019 0628  Last data filed at 12/16/2019 4104  Gross per 24 hour   Intake 2280 ml   Output 7200 ml   Net -4920 ml       Invasive Devices     Peripheral Intravenous Line            Peripheral IV 12/15/19 Left;Proximal;Ventral (anterior) Forearm 1 day                Physical Exam:  Adult female who appears her stated age  Sitting out of bed in the chair hips flexed knees flexed  Ortho Exam: Hip  Mepilex dressing right anterolateral thigh  Reproducible pain to palpation anterior thigh from the groin crease to the mid thigh  Slightly increased thigh size but a negative Homans    No calf pain of either lower extremity    Lab, Imaging and other studies:   CBC:   Lab Results   Component Value Date    WBC 4 81 12/16/2019    HGB 8 8 (L) 12/16/2019    HCT 27 2 (L) 12/16/2019    MCV 87 12/16/2019     12/16/2019    MCH 28 2 12/16/2019    MCHC 32 4 12/16/2019    RDW 22 3 (H) 12/16/2019    MPV 8 5 (L) 12/16/2019    NRBC 0 12/16/2019     CMP: No results found for: SODIUM, CL, CO2, ANIONGAP, BUN, CREATININE, GLUCOSE, CALCIUM, AST, ALT, ALKPHOS, PROT, BILITOT, EGFR

## 2019-12-17 PROCEDURE — 99024 POSTOP FOLLOW-UP VISIT: CPT | Performed by: PHYSICIAN ASSISTANT

## 2019-12-17 PROCEDURE — 97116 GAIT TRAINING THERAPY: CPT

## 2019-12-17 PROCEDURE — 97535 SELF CARE MNGMENT TRAINING: CPT

## 2019-12-17 PROCEDURE — 97530 THERAPEUTIC ACTIVITIES: CPT

## 2019-12-17 RX ADMIN — LORAZEPAM 2 MG: 1 TABLET ORAL at 12:05

## 2019-12-17 RX ADMIN — PAROXETINE 60 MG: 20 TABLET, FILM COATED ORAL at 08:01

## 2019-12-17 RX ADMIN — GABAPENTIN 100 MG: 100 CAPSULE ORAL at 07:17

## 2019-12-17 RX ADMIN — GABAPENTIN 100 MG: 100 CAPSULE ORAL at 23:16

## 2019-12-17 RX ADMIN — LEVOTHYROXINE SODIUM 25 MCG: 25 TABLET ORAL at 05:26

## 2019-12-17 RX ADMIN — ENOXAPARIN SODIUM 40 MG: 40 INJECTION SUBCUTANEOUS at 23:16

## 2019-12-17 RX ADMIN — METHOCARBAMOL TABLETS 750 MG: 750 TABLET, COATED ORAL at 17:02

## 2019-12-17 RX ADMIN — GABAPENTIN 100 MG: 100 CAPSULE ORAL at 15:43

## 2019-12-17 RX ADMIN — METHOCARBAMOL TABLETS 750 MG: 750 TABLET, COATED ORAL at 23:16

## 2019-12-17 RX ADMIN — METHOCARBAMOL TABLETS 750 MG: 750 TABLET, COATED ORAL at 12:05

## 2019-12-17 RX ADMIN — QUETIAPINE FUMARATE 400 MG: 100 TABLET ORAL at 23:16

## 2019-12-17 RX ADMIN — PANTOPRAZOLE SODIUM 40 MG: 40 TABLET, DELAYED RELEASE ORAL at 08:01

## 2019-12-17 RX ADMIN — ENOXAPARIN SODIUM 40 MG: 40 INJECTION SUBCUTANEOUS at 03:18

## 2019-12-17 RX ADMIN — METHOCARBAMOL TABLETS 750 MG: 750 TABLET, COATED ORAL at 05:26

## 2019-12-17 RX ADMIN — ZIPRASIDONE HCL 80 MG: 40 CAPSULE ORAL at 08:01

## 2019-12-17 RX ADMIN — ZIPRASIDONE HCL 80 MG: 40 CAPSULE ORAL at 17:01

## 2019-12-17 RX ADMIN — DOCUSATE SODIUM 100 MG: 100 CAPSULE, LIQUID FILLED ORAL at 08:01

## 2019-12-17 NOTE — PHYSICAL THERAPY NOTE
PHYSICAL THERAPY Treatment NOTE    Patient Name: Dontrell Ramírez  TQPII'S Date: 12/17/2019 12/17/19 5872   Pain Assessment   Pain Assessment 0-10   Pain Score 2   Pain Type Acute pain   Pain Location Hip   Pain Orientation Right   Pain Descriptors Aching   Pain Frequency Constant/continuous   Pain Onset Ongoing   Clinical Progression Gradually improving   Patient's Stated Pain Goal No pain   Hospital Pain Intervention(s) Repositioned; Ambulation/increased activity   Response to Interventions Tolerated   Precautions   Total Hip Replacement Other (Comment)  (Anterior hip precautions)   Restrictions/Precautions   Weight Bearing Precautions Per Order Yes   RLE Weight Bearing Per Order WBAT   General   Chart Reviewed Yes   Additional Pertinent History Pt is a 60 yo F presenting to therapy s/p R ELIZABETH  Family/Caregiver Present No   Cognition   Overall Cognitive Status Impaired   Arousal/Participation Alert; Cooperative   Attention Attends with cues to redirect   Orientation Level Oriented X4   Memory Within functional limits   Following Commands Follows one step commands with increased time or repetition   Comments Pt was pleasant and cooperative throughout session  Will sometimes require increased time to process cues and will require cues to redirect  Subjective   Subjective Pt reports a decrease in pain levels and feels as if she has made significant progress  She requested that next session she wants to work on "moving around in bed, getting on and off the toilet, and functional activities in the house"  Bed Mobility   Additional Comments Pt was seated in recliner upon PT arrival and was left seated in the recliner with all needs within reach at the end of the therapy session  Transfers   Sit to Stand 5  Supervision   Additional items Assist x 1; Increased time required   Stand to Sit 5  Supervision   Additional items Assist x 1; Increased time required   Ambulation/Elevation   Gait pattern Improper Weight shift;Narrow MIGUEL; Forward Flexion; Short stride; Excessively slow   Gait Assistance 5  Supervision   Additional items Assist x 1;Verbal cues  (VC for RW management and posture)   Assistive Device Rolling walker   Distance 110 ft x 2   Stair Management Assistance 5  Supervision   Additional items Assist x 1;Verbal cues; Increased time required  (VC for motor sequencing)   Stair Management Technique Two rails   Number of Stairs 5   Balance   Static Sitting Fair +   Dynamic Sitting Fair +   Static Standing Fair -   Dynamic Standing Fair -   Ambulatory Fair -   Endurance Deficit   Endurance Deficit Yes   Endurance Deficit Description Fatigue   Activity Tolerance   Activity Tolerance Patient limited by fatigue   Nurse Made Aware Spoke to RN   Assessment   Prognosis Good   Problem List Decreased strength;Decreased range of motion; Impaired balance;Decreased endurance;Decreased mobility; Decreased cognition;Pain;Orthopedic restrictions   Assessment Pt is a 62 yo F presenting to therapy s/p R ELIZABETH  Pt was pleasant and cooperative throughout session but required increased time for processing information  She has been making slow progress towards goals as progress is limited by activity tolerance and fatigue  Progress can be seen in decrease in pain levels as well as the decrease in hands on assistance needed when performing transfers and ambulation  Pt can now perform transfers with supervision and will ambulate with CGA for safety  She continues to require VC to keep the RW close to prevent a LOB and for postural degradation  At the end of the session, she also reported concerns with being DC home and feels as if she did not feel safe  Pt was then educated upon her progress and her different DC options   She was agreeable to home with increased support and home PT as long as she was able to have one more PT session in which the following transfers and deficits were addressed: bed mobility, standing balance, and toilet transfers  Pt will benefit from continued skilled therapy addressing mobility and functionality deficits  DC recommendation is to home and home PT once medically cleared  Pt is a PT to see for bed mobility, standing balance to enable the pt to perform ADLs, and toilet transfers next session to make safe transition to home and PLOF  Barriers to Discharge Inaccessible home environment   Barriers to Discharge Comments Patient reports that due to hoarding, she is unable to fit a walker within the home environment and due to mother's recent back surgery is unable to have any family support upon DC  Will require trial with cane and ability to perform ADLs without assistance to allow patient to safely return home  Goals   STG Expiration Date 12/22/19   PT Treatment Day 7   Plan   Treatment/Interventions ADL retraining;Functional transfer training;LE strengthening/ROM; Endurance training; Therapeutic exercise;Elevations; Bed mobility;Gait training;Spoke to nursing   Progress Slow progress, decreased activity tolerance   PT Frequency 7x/wk   Recommendation   Recommendation Home PT   Equipment Recommended Walker   PT - OK to Discharge Yes   Additional Comments DC to home with family support and home PT pending medical clearance       Anna Liang, SPT, 12/17/2019

## 2019-12-17 NOTE — PROGRESS NOTES
Internal Medicine Progress Note  Patient: Cuauhtemoc Khanna  Age/sex: 61 y o  female  Medical Record #: [id number]      ASSESSMENT/PLAN: (Interval History)  Cuauhtemoc Khanna is seen and examined and management for following issues:    R ELIZABETH 12/11/19  · Pain control per Ortho  · Continue encourage incentive spirometry; monitor fever curve      Post operative blood loss anemia  · Did receive 3U PRBCs incl one 12/14  · Venofer given x 2   · CBC reviewed hgb stable     Hypothyroid  · Cont supplementation     Schizophrenia disorder  · Cont current medications   · Mood stable     Dispo  · For rehab = pending    Patient stable for DC from medical standpoint  The above assessment and plan was reviewed and updated as determined by my evaluation of the patient on 12/17/2019  Labs:   Results from last 7 days   Lab Units 12/16/19  0511 12/15/19  0514   WBC Thousand/uL 4 81 4 86   HEMOGLOBIN g/dL 8 8* 8 4*   HEMATOCRIT % 27 2* 25 7*   PLATELETS Thousands/uL 224 175     Results from last 7 days   Lab Units 12/16/19  0511 12/15/19  0514  12/11/19  2016   SODIUM mmol/L 137 134*   < >  --    POTASSIUM mmol/L 3 9 4 0   < >  --    CHLORIDE mmol/L 102 99*   < >  --    CO2 mmol/L 32 33*   < >  --    CO2, I-STAT mmol/L  --   --   --  28   BUN mg/dL 6 4*   < >  --    CREATININE mg/dL 0 56* 0 53*   < >  --    GLUCOSE, ISTAT mg/dl  --   --   --  155*   CALCIUM mg/dL 8 9 8 5   < >  --     < > = values in this interval not displayed               Results from last 7 days   Lab Units 12/11/19  2121   POC GLUCOSE mg/dl 163*       Review of Scheduled Meds:    Current Facility-Administered Medications:  acetaminophen 650 mg Oral Q6H PRN [place] Night, PA-C    aluminum-magnesium hydroxide-simethicone 30 mL Oral Q6H PRN [place] Night, PA-C    calcium carbonate 1,000 mg Oral Daily PRN [place] Night, PA-C    docusate sodium 100 mg Oral BID [place] Night, PA-C    enoxaparin 40 mg Subcutaneous Daily [place] Night, PA-C gabapentin 100 mg Oral Q8H Arleta Downy, PA-C    lactated ringers 1 5 mL/kg/hr Intravenous Continuous Arleta Downy, PA-C Last Rate: 6 887 mL/kg/hr (12/13/19 2346)   levothyroxine 25 mcg Oral Daily Arleta Downy, PA-C    LORazepam 2 mg Oral Q8H PRN Arleta Downy, PA-C    methocarbamol 750 mg Oral Q6H [facility] Arleta Downy, PA-C    ondansetron 4 mg Intravenous Q6H PRN Arleta Downy, PA-C    oxyCODONE 10 mg Oral Q4H PRN Arleta Downy, PA-C    oxyCODONE 5 mg Oral Q4H PRN Arleta Downy, PA-C    pantoprazole 40 mg Oral Daily Arleta Downy, PA-C    PARoxetine 60 mg Oral QAM Arleta Downy, PA-C    QUEtiapine 400 mg Oral HS Arleta Downy, PA-C    ziprasidone 80 mg Oral BID Arleta Downy, PA-C        Subjective/ HPI: Patient seen and examined  Patients overnight issues or events were reviewed with nursing or staff during rounds or morning huddle session  New or overnight issues include the following:     Pt did well overnight  Awaiting rehab decision  ROS:   A 10 point ROS was performed; negative except as noted above         Imaging:     No orders to display       *Labs /Radiology studiesReviewed  *Medications Reviewed and reconciled as needed  *Please refer to order section for additional ordered labs studies  *Case discussed with primary attending during morning huddle case rounds    Physical Examination:  Vitals:   Vitals:    12/16/19 1513 12/16/19 1920 12/16/19 2306 12/17/19 0342   BP: 131/80  100/57 130/75   Pulse: 100  85 93   Resp: 18  18    Temp: 98 1 °F (36 7 °C)  98 6 °F (37 °C)    TempSrc:       SpO2:  100% 97% 94%   Weight:       Height:           General Appearance: no distress, conversive  HEENT: PERRLA, conjuctiva normal; oropharynx clear; mucous membranes moist;   Neck:  Supple, no lymphadenopathy or thyromegaly  Lungs: CTA, normal respiratory effort, no retractions, expiratory effort normal  CV: regular rate and rhythm , PMI normal   ABD: soft non tender, no masses , no hepatic or splenomegaly  EXT: DP pulses intact, no lymphadenopathy, no edema  Skin: normal turgor, normal texture, no rash  Psych: affect normal, mood normal  Neuro: AAOx3      The above physical exam was reviewed and updated as determined by my evaluation of the patient on 12/17/2019  Invasive Devices     Peripheral Intravenous Line            Peripheral IV 12/15/19 Left;Proximal;Ventral (anterior) Forearm 2 days                   VTE Pharmacologic Prophylaxis: Enoxaparin (Lovenox)  Code Status: Level 1 - Full Code  Current Length of Stay: 6 day(s)      Total time spent:  30 minutes with more than 50% spent counseling/coordinating care  Counseling includes discussion with patient re: progress  and discussion with patient of his/her current medical state/information  Coordination of patient's care was performed in conjunction with primary service  Time invested included review of patient's labs, vitals, and management of their comorbidities with continued monitoring  In addition, this patient was discussed with medical team including physician and advanced extenders  The care of the patient was extensively discussed and appropriate treatment plan was formulated unique for this patient  ** Please Note:  voice to text software may have been used in the creation of this document   Although proof errors in transcription or interpretation are a potential of such software**

## 2019-12-17 NOTE — PHYSICAL THERAPY NOTE
PHYSICAL THERAPY Treatment NOTE    Patient Name: Snehal Morris  NVOKG'[id number] Date: 12/17/2019 12/17/19 1208   Pain Assessment   Pain Assessment 0-10   Pain Score 2   Pain Type Acute pain   Pain Location Hip   Pain Orientation Right   Precautions   Total Hip Replacement   (Anterior total Hip)   Restrictions/Precautions   Weight Bearing Precautions Per Order Yes   RLE Weight Bearing Per Order WBAT   Other Precautions Fall Risk;Pain;Cognitive; Chair Alarm; Bed Alarm; Impulsive   General   Chart Reviewed Yes   Additional Pertinent History Pt  is a 60 yo F who presents POD 6 R Anterior ELIZABETH  WBAT RLE  Family/Caregiver Present No   Cognition   Overall Cognitive Status Impaired   Arousal/Participation Cooperative   Attention Attends with cues to redirect   Orientation Level Oriented X4   Memory Within functional limits   Following Commands Follows multistep commands with increased time or repetition   Comments Pt  was identified with full name and birthdate   Subjective   Subjective Pt  agreeable to PT session   Bed Mobility   Additional Comments Pt  seated OOB in recliner prior to PT session  Transfers   Sit to Stand 5  Supervision   Additional items Assist x 1; Increased time required;Verbal cues   Stand to Sit 5  Supervision   Additional items Assist x 1; Impulsive;Verbal cues  (to reach back to control descent)   Additional Comments Pt  performed transfers with supervision requiring increased time to stand and supervision to return to sitting with VCs for hand placement and sequencing, and to reach back to control descent  Ambulation/Elevation   Gait pattern Improper Weight shift;Narrow MIGUEL; Forward Flexion;Decreased foot clearance;Shuffling; Inconsistent abundio; Redundant gait at times; Short stride; Step to;Excessively slow   Gait Assistance 4  Minimal assist  (progressing to supervision after 10'x1)   Additional items Assist x 1;Verbal cues  (for posture and sequencing, with cane)   Assistive Device Small base quad cane   Distance 40'x1   Balance   Static Sitting Fair +   Dynamic Sitting Fair   Static Standing Fair -   Dynamic Standing Fair -   Ambulatory Poor +   Endurance Deficit   Endurance Deficit Yes   Endurance Deficit Description postural and gait degradation noted with fatigue   Activity Tolerance   Activity Tolerance Patient limited by fatigue   Medical Staff Made Aware Spoke to OT, CM and Ortho   Nurse Made Aware spoke to RN   Assessment   Prognosis Good   Problem List Decreased strength;Decreased range of motion; Impaired balance;Decreased endurance;Decreased mobility; Decreased cognition;Pain;Orthopedic restrictions   Assessment Pt  is a 60 yo F who presents POD 6 R Anterior ELIZABETH  WBAT RLE  Pt  was identified with full name and birthdate  Pt  agreeable to PT session  PT alerted by Orthopedic PA Neymar Murray that Preoperatively likely determination for patient to be discharged to rehab was made due to lack of social support and current home setup  Barriers to discharge noted on Preoperative assessment were determined to be "Caregiver support and Mothers support " Also documented that, "If possible, the goal is to discharge patient home and for them to attend outpatient PT Toni Rose if discharged home) " Due to current Social Situation and difficulty navigating home with RW, Ortho PA reports questionable safety and fall risk in previous environment  Reports safest means of AD would be cane due to narrow spaces and inability to navigate with RW  Trial with narrow based quad cane performed at Orthopedics request  At this time, patient required Minimal assistance with quad cane x10' and supervision for remaining 30'x of 40'x1 ambulation trial  Pt  Reports fatigue and requests return to room  Postural and gait degradation noted   Pt  Continues to require supervision for transfers and previously demonstrated ability to perform stairs with supervision and bed mobility with supervision  Due to limited home support, inaccessible home environment, and need for Minimal assistance with ambulation at this time, recommend discharge to rehab to allow for increased functional independence and activity tolerance to allow safe return to PLOF with decreased burden of care and decreased fall risk  Barriers to Discharge Inaccessible home environment   Barriers to Discharge Comments Due to social barriers, patient unable to fit RW in home, and will require use of different AD for functional mobility  Pt  reports prior to surgery using a folded RW to navigate or no AD at all  At this time, Pt  requires the use of quad cane for functional mobility  Goals   Patient Goals to be safe   STG Expiration Date 12/22/19   PT Treatment Day 7   Plan   Treatment/Interventions ADL retraining;Functional transfer training;LE strengthening/ROM; Elevations; Therapeutic exercise; Endurance training;Cognitive reorientation;Patient/family training;Equipment eval/education; Bed mobility;Gait training;Spoke to nursing;Spoke to case management;Spoke to advanced practitioner;OT   Progress Slow progress, decreased activity tolerance   PT Frequency 7x/wk   Recommendation   Recommendation Post acute IP rehab   Equipment Recommended Cane  ([address] [place] [address])   PT - OK to Discharge Yes   Additional Comments Due to Social barriers and inability to safely perform mobility with required AD, recommend rehab to increase functional independence and activity tolerance     Ruddy Shea, PT, DPT 12/17/2019

## 2019-12-17 NOTE — H&P
Orthopedics   Idalialm Greer 61 y o  female MRN: [id number]  Unit/Bed#: -01      Chief Complaint:   right hip pain    HPI:   61 y  o female complaining of right hip pain  Patient reports approximately 4 months of right hip pain  She has failed previous non operative management, including activity modification and corticosteroid injections  She presents today here for right total hip arthroplasty      Review Of Systems:   · Skin: Normal  · Neuro: See HPI  · Musculoskeletal: See HPI  · 14 point review of systems negative except as stated above     Past Medical History:   Past Medical History:   Diagnosis Date    Anxiety     Back pain at L4-L5 level     Schreiber esophagus     Bulimia     Disease of thyroid gland     hypothyroid    GERD (gastroesophageal reflux disease)     Hyperlipidemia     Hypothyroidism     Leukopenia     Rheumatoid arthritis involving right hip (HCC)     Sciatica     Seizure (HCC)     due to medication mix up 8/2018 only one historically    Synovial cyst of lumbar spine     Vertigo     Weight gain        Past Surgical History:   Past Surgical History:   Procedure Laterality Date    BONE MARROW BIOPSY      BREAST SURGERY      enlargement     COLONOSCOPY      MI TOTAL HIP ARTHROPLASTY Right 12/11/2019    Procedure: ARTHROPLASTY HIP TOTAL ANTERIOR;  Surgeon: Camille Gibbons MD;  [facility]: [facility];  Service: Orthopedics    RHINOPLASTY         Family History:  Family history reviewed and non-contributory  Family History   Problem Relation Age of Onset    Uterine cancer Mother     Esophageal cancer Father     Colon cancer Maternal Grandmother        Social History:  Social History     Socioeconomic History    Marital status: Single     Spouse name: None    Number of children: None    Years of education: None    Highest education level: None   Occupational History    None   Social Needs    Financial resource strain: None    Food insecurity:     Worry: None Inability: None    Transportation needs:     Medical: None     Non-medical: None   Tobacco Use    Smoking status: Never Smoker    Smokeless tobacco: Never Used   Substance and Sexual Activity    Alcohol use: Never     Frequency: Never    Drug use: No    Sexual activity: Not Currently   Lifestyle    Physical activity:     Days per week: None     Minutes per session: None    Stress: To some extent   Relationships    Social connections:     Talks on phone: None     Gets together: None     Attends Rastafari service: None     Active member of club or organization: None     Attends meetings of clubs or organizations: None     Relationship status: None    Intimate partner violence:     Fear of current or ex partner: None     Emotionally abused: None     Physically abused: None     Forced sexual activity: None   Other Topics Concern    None   Social History Narrative    Family disruption death of family member parent, per allscripts       Allergies:    Allergies   Allergen Reactions    Risperdal [Risperidone] Shortness Of Breath     Rapid heart beat, SOB    Zyprexa [Olanzapine] Shortness Of Breath     Rapid heartbeat    Latex Rash     Band aids, adhesives wears normal underwear, can eat bananas  USE PAPER TAPE           Labs:  0   Lab Value Date/Time    HCT 27 2 (L) 12/16/2019 0511    HCT 25 7 (L) 12/15/2019 0514    HCT 29 2 (L) 12/14/2019 1223    HGB 8 8 (L) 12/16/2019 0511    HGB 8 4 (L) 12/15/2019 0514    HGB 9 6 (L) 12/14/2019 1223    INR 0 99 12/04/2019 1004    WBC 4 81 12/16/2019 0511    WBC 4 86 12/15/2019 0514    WBC 5 78 12/14/2019 0441    CRP 4 8 (H) 12/04/2019 1004       Meds:    Current Facility-Administered Medications:     acetaminophen (TYLENOL) tablet 650 mg, 650 mg, Oral, Q6H PRN, Army Chong PA-WAYNE, 650 mg at 12/15/19 0507    aluminum-magnesium hydroxide-simethicone (MYLANTA) 200-200-20 mg/5 mL oral suspension 30 mL, 30 mL, Oral, Q6H PRN, Army Chong PA-WAYNE    calcium carbonate (TUMS) chewable tablet 1,000 mg, 1,000 mg, Oral, Daily PRN, Garden Miracle, PA-C, 1,000 mg at 12/14/19 0820    docusate sodium (COLACE) capsule 100 mg, 100 mg, Oral, BID, Frank Miracle, PA-C, 100 mg at 12/17/19 0801    enoxaparin (LOVENOX) subcutaneous injection 40 mg, 40 mg, Subcutaneous, Daily, Garden Miracle, PA-C, 40 mg at 12/17/19 0318    gabapentin (NEURONTIN) capsule 100 mg, 100 mg, Oral, Q8H, Frank Miracle, PA-C, 100 mg at 12/17/19 8059    lactated ringers infusion, 1 5 mL/kg/hr, Intravenous, Continuous, Frank Miracle, PA-C, Last Rate: 500 mL/hr at 12/13/19 2346, 6 887 mL/kg/hr at 12/13/19 2346    levothyroxine tablet 25 mcg, 25 mcg, Oral, Daily, Frank Miracle, PA-C, 25 mcg at 12/17/19 0526    LORazepam (ATIVAN) tablet 2 mg, 2 mg, Oral, Q8H PRN, Garden Miracle, PA-C, 2 mg at 12/16/19 0944    methocarbamol (ROBAXIN) tablet 750 mg, 750 mg, Oral, Q6H [address], Frank Miracle, PA-C, 750 mg at 12/17/19 0526    ondansetron (ZOFRAN) injection 4 mg, 4 mg, Intravenous, Q6H PRN, Frank Miracle, PA-C, 4 mg at 12/14/19 0803    oxyCODONE (ROXICODONE) immediate release tablet 10 mg, 10 mg, Oral, Q4H PRN, Garden Miracle, PA-C, 10 mg at 12/16/19 1513    oxyCODONE (ROXICODONE) IR tablet 5 mg, 5 mg, Oral, Q4H PRN, Garden Miracle, PA-C    pantoprazole (PROTONIX) EC tablet 40 mg, 40 mg, Oral, Daily, Frank Miracle, PA-C, 40 mg at 12/17/19 0801    PARoxetine (PAXIL) tablet 60 mg, 60 mg, Oral, QAM, Garden Miracle, PA-C, 60 mg at 12/17/19 0801    QUEtiapine (SEROquel) tablet 400 mg, 400 mg, Oral, HS, Frank Miracle, PA-C, 400 mg at 12/16/19 2117    ziprasidone (GEODON) capsule 80 mg, 80 mg, Oral, BID, Frank Miracle, PA-C, 80 mg at 12/17/19 0801    Blood Culture:   Lab Results   Component Value Date    BLOODCX No Growth After 5 Days  08/13/2018    BLOODCX Staphylococcus coagulase negative (A) 08/13/2018       Wound Culture:   No results found for: WOUNDCULT    Ins and Outs:  I/O last 24 hours:   In: 36 [P O :9480]  Out: 3057 [Urine:5600]          Physical Exam:   /75   Pulse 94   Temp 98 °F (36 7 °C)   Resp 18   Ht 5' 3 5" (1 613 m)   Wt 72 6 kg (160 lb)   SpO2 100%   BMI 27 90 kg/m²   Gen: Alert and oriented to person, place, time  HEENT: EOMI, eyes clear, moist mucus membranes, hearing intact  Respiratory: Bilateral chest rise  No audible wheezing found  Cardiovascular: Regular Rate and Rhythm  Abdomen: soft nontender/nondistended  Musculoskeletal: right lower extremity  · Skin pink, dry, and intact, no erythema  · Painful range of motion of hip joint but no micromotion tenderness  · Sensation intact L3-S1  · Motor intact to hip flexion/extension, knee flexion/extension, ankle dorsi/plantar flexion  · 2+ DP pulse    Radiology:   I personally reviewed the films  X-rays of right hip shows osteoarthritis    _*_*_*_*_*_*_*_*_*_*_*_*_*_*_*_*_*_*_*_*_*_*_*_*_*_*_*_*_*_*_*_*_*_*_*_*_*_*_*_*_*    Assessment:  61 y  o female with right hip osteoarthritis, presenting today for R ELIZABETH       Plan:   · WBAT right lower extremity  · PT post op  · Pain control  · Dispo: OR today    Maria Roque MD

## 2019-12-17 NOTE — PLAN OF CARE
Problem: PHYSICAL THERAPY ADULT  Goal: Performs mobility at highest level of function for planned discharge setting  See evaluation for individualized goals  Outcome: Progressing  Note:   Prognosis: Good  Problem List: Decreased strength, Decreased endurance, Decreased range of motion, Impaired balance, Decreased mobility, Decreased coordination, Decreased safety awareness, Pain, Orthopedic restrictions  Assessment: Pt  is a 62 yo F who presents with S/p R ELIZABETH  Pt  Agreeable to PT session  Pt  Demonstrates consistent need for supervision with transfers and gait with VCs for sequencing, posture, AD management and mechanics  Pt  Is progressing towards goals and demonstrates consistency with ability to improve independence with functional mobility  Pt  Will benefit from continued skilled therapy to increase functional independence and aid patient in return to PLOF with decreased burden of care  Will recommend patient return Home with family support and HHPT upon discharge pending patients continued progression towards goals and ability to perform stairs without hands on assistance  Barriers to Discharge: Inaccessible home environment     Recommendation: Home PT, Home with family support     PT - OK to Discharge: Yes    See flowsheet documentation for full assessment

## 2019-12-17 NOTE — SOCIAL WORK
Met with pt and physical therapy  Pt reported that her physician wants her to attend rehab  [address] [facility] evaluated pt today and received clinical info  Await Letter of Determination  Pt chose [facility] as her first choice

## 2019-12-17 NOTE — OCCUPATIONAL THERAPY NOTE
Occupational Therapy Treatment Note:       12/17/19 1155   Restrictions/Precautions   Weight Bearing Precautions Per Order Yes   RLE Weight Bearing Per Order WBAT   Other Precautions Fall Risk;Pain   Pain Assessment   Pain Score 9   Pain Type Acute pain   Pain Location Hip   Pain Orientation Right   ADL   Where Assessed Commode   Grooming Assistance 5  Supervision/Setup   Grooming Deficit Teeth care   Grooming Comments seated   Toileting Assistance  4  Minimal Assistance   Toileting Deficit Clothing management up;Clothing management down   Toileting Comments can complete hygine assit for LBCM due to decreased dynamic standing balance   Functional Standing Tolerance   Time ~3 mins   Activity ststic standing at sink   Comments rw level   Bed Mobility   Additional Comments OOB upon presenattion and at end of session   Transfers   Sit to Stand 5  Supervision   Additional items Assist x 1   Stand to Sit 5  Supervision   Additional items Assist x 1   Stand pivot 4  Minimal assistance   Additional items Assist x 1   Toilet transfer 4  Minimal assistance   Additional items Assist x 1   Additional Comments Pt requires min A for overal safety during stand pivot tranfers due to poor carryover of safe RW use education   Pt lifting RW and turning, crossing legs which increased fall risk to turn/ pivot for tranfers   Functional Mobility   Functional Mobility 5  Supervision   Additional Comments very close SBA with increased VC for safe RW use ( Pt tends to lift RW off ground an place as if it were a standard walker) educated on propoer use throguhout, decreased carryover   Additional items Rolling walker   Toilet Transfers   Toilet Transfer Type To and from   Toilet Transfer to Raised toilet seat with rails   Toilet Transfer Technique Ambulating;Stand pivot   Toilet Transfers Minimal assistance;Verbal cues   Toilet Transfers Comments increased time and VC   Cognition   Overall Cognitive Status Impaired   Arousal/Participation Alert; Cooperative   Attention Attends with cues to redirect   Orientation Level Oriented X4   Memory Decreased recall of recent events   Following Commands Follows one step commands with increased time or repetition   Comments decreased recall opf education and new learning, increased cues TC and VC reuqired throguhout for safe tranfer techqniues and carryovoer  Activity Tolerance   Activity Tolerance Patient limited by fatigue   Medical Staff Made Aware NSG aware   Assessment   Assessment Pt was seen this date for OT tx session focusing on self care tasks, tranfers, sit to stand, funcoitnal mobility, fall prevention education, carry over of safe transfer and RW use and overall activity toelrance  Pt presents OOB in chair, completes previously ementioned tasks at docuemetned assist levels, see above  Per Ortho note, preoperativite planning for STR d/c was dicussed due to inaccessabile home enviroment, RW use in the home is not an option due to current home enviroment  Pt demosntrates difficulity with dynamic standing tasks and requires use of RW for stability  Pt contiues to require ocassional min A and increased VC for overall safety  Restingin chair at end of session with all needs in reach  Would benefit from continued OT Tx to improve overall funciotnal abilities  Continue to follow with current POC     Plan   Treatment Interventions ADL retraining   Goal Expiration Date 12/26/19   OT Treatment Day 3   OT Frequency 3-5x/wk   Recommendation   OT Discharge Recommendation Short Term [id number] [facility] [place], [place]

## 2019-12-17 NOTE — PHYSICAL THERAPY NOTE
PHYSICAL THERAPY Treatment NOTE    Patient Name: Keri Vivar  [id number] Date: 12/16/2019 12/16/19 1709   Pain Assessment   Pain Assessment 0-10   Pain Score Worst Possible Pain   Pain Location Hip   Pain Orientation Right   Restrictions/Precautions   Weight Bearing Precautions Per Order Yes   RLE Weight Bearing Per Order WBAT   LLE Weight Bearing Per Order FWB   Other Precautions Fall Risk;Pain;Cognitive; Chair Alarm; Bed Alarm; Impulsive   General   Chart Reviewed Yes   Additional Pertinent History Pt  is a 62 yo F who presents with S/p R ELIZABETH   Family/Caregiver Present No   Subjective   Subjective Pt  agreeable to PT session   Bed Mobility   Supine to Sit 5  Supervision   Additional items Assist x 1; Increased time required   Sit to Supine 5  Supervision   Additional items Assist x 1; Impulsive; Increased time required   Transfers   Sit to Stand 5  Supervision   Additional items Assist x 1; Increased time required   Stand to Sit 5  Supervision   Additional items Assist x 1; Increased time required   Ambulation/Elevation   Gait pattern Improper Weight shift;Narrow MIGUEL; Forward Flexion;Decreased foot clearance;Shuffling; Short stride; Step to;Excessively slow   Gait Assistance 5  Supervision   Additional items Verbal cues  (for posture and AD managment)   Assistive Device Rolling walker   Distance 120'x1   Balance   Static Sitting Fair +   Dynamic Sitting Fair   Static Standing Fair -   Dynamic Standing Fair -   Ambulatory Fair -   Endurance Deficit   Endurance Deficit Yes   Endurance Deficit Description postural and gait degradation with fatigue   Activity Tolerance   Activity Tolerance Patient limited by fatigue   Nurse Made Aware Spoke to RN   Assessment   Prognosis Good   Problem List Decreased strength;Decreased endurance;Decreased range of motion; Impaired balance;Decreased mobility; Decreased coordination;Decreased safety awareness;Pain;Orthopedic restrictions   Assessment Pt  is a 62 yo F who presents with S/p R ELIZABETH  Pt  Agreeable to PT session  Pt  Demonstrates consistent need for supervision with transfers and gait with VCs for sequencing, posture, AD management and mechanics  Pt  Is progressing towards goals and demonstrates consistency with ability to improve independence with functional mobility  Pt  Will benefit from continued skilled therapy to increase functional independence and aid patient in return to PLOF with decreased burden of care  Will recommend patient return Home with family support and HHPT upon discharge pending patients continued progression towards goals and ability to perform stairs without hands on assistance  Goals   Patient Goals to get moving more   STG Expiration Date 12/22/19   PT Treatment Day 6   Plan   Treatment/Interventions Functional transfer training;LE strengthening/ROM; Elevations; Therapeutic exercise; Endurance training;Cognitive reorientation;Equipment eval/education;Patient/family training;Bed mobility;Gait training;Spoke to nursing   Progress Slow progress, decreased activity tolerance   PT Frequency 7x/wk   Recommendation   Recommendation Home PT; Home with family support   Equipment Recommended Walker   PT - OK to Discharge Yes   Additional Comments When medically appropriate, pending continued progression of gait distances and ability to perform stairs with supervision     Nicolasa Lanier, PT, DPT 12/16/2019

## 2019-12-17 NOTE — PLAN OF CARE
Problem: PHYSICAL THERAPY ADULT  Goal: Performs mobility at highest level of function for planned discharge setting  See evaluation for individualized goals  Outcome: Progressing  Note:   Prognosis: Good  Problem List: Decreased strength, Decreased range of motion, Impaired balance, Decreased endurance, Decreased mobility, Decreased cognition, Pain, Orthopedic restrictions  Assessment: Pt is a 60 yo F presenting to therapy s/p R ELIZABETH  Pt was pleasant and cooperative throughout session but required increased time for processing information  She has been making slow progress towards goals as progress is limited by activity tolerance and fatigue  Progress can be seen in decrease in pain levels as well as the decrease in hands on assistance needed when performing transfers and ambulation  Pt can now perform transfers with supervision and will ambulate with CGA for safety  She continues to require VC to keep the RW close to prevent a LOB and for postural degradation  At the end of the session, she also reported concerns with being DC home and feels as if she did not feel safe  Pt was then educated upon her progress and her different DC options  She was agreeable to home with increased support and home PT as long as she was able to have one more PT session in which the following transfers and deficits were addressed: bed mobility, standing balance, and toilet transfers  Pt will benefit from continued skilled therapy addressing mobility and functionality deficits  DC recommendation is to home and home PT once medically cleared  Pt is a PT to see for bed mobility, standing balance to enable the pt to perform ADLs, and toilet transfers next session to make safe transition to home and PLOF    Barriers to Discharge: Inaccessible home environment  Barriers to Discharge Comments: Patient reports that due to hoarding, she is unable to fit a walker within the home environment and due to mother's recent back surgery is unable to have any family support upon DC  Will require trial with cane and ability to perform ADLs without assistance to allow patient to safely return home  Recommendation: Home PT     PT - OK to Discharge: Yes    See flowsheet documentation for full assessment

## 2019-12-17 NOTE — UTILIZATION REVIEW
Elective Surgical Continued Stay Review    Date: 12/17    POD#: 6  Current Patient Class: Inpatient Current Level of Care: Med Surg    Assessment/Plan: 61 y o  female, initial surgery date 12/11 for POD #6 R anterior ELIZABETH and Post op Blood loss anemia  WBAT RLE  Pain control  Continue encourage incentive spirometry; monitor fever curve  Cbc stable  Acute IP rehab at d/c - in progress  Pertinent Labs/Diagnostic Results:    12/16/19 0511    WBC 4 31 - 10 16 Thousand/uL 4 81    RBC 3 81 - 5 12 Million/uL 3  12Low     Hemoglobin 11 5 - 15 4 g/dL 8 8Low     Hematocrit 34 8 - 46 1 % 27 2Low       Vital Signs:   12/17/19 15:25:06  98 3 °F (36 8 °C)  89  17  149/95  113  99 %     12/17/19 07:15:59  98 °F (36 7 °C)  94  18  133/75  94  100 %     12/17/19 03:42:45    93    130/75  93  94 %         Medications:   Scheduled Medications:  Medications:  docusate sodium 100 mg Oral BID   enoxaparin 40 mg Subcutaneous Daily   gabapentin 100 mg Oral Q8H   levothyroxine 25 mcg Oral Daily   methocarbamol 750 mg Oral Q6H YUMIKO   pantoprazole 40 mg Oral Daily   PARoxetine 60 mg Oral QAM   QUEtiapine 400 mg Oral HS   ziprasidone 80 mg Oral BID     Continuous IV Infusions:  lactated ringers 1 5 mL/kg/hr Intravenous Continuous     PRN Meds:  acetaminophen 650 mg Oral Q6H PRN   aluminum-magnesium hydroxide-simethicone 30 mL Oral Q6H PRN   calcium carbonate 1,000 mg Oral Daily PRN   LORazepam 2 mg Oral Q8H PRN   ondansetron 4 mg Intravenous Q6H PRN   oxyCODONE 10 mg Oral Q4H PRN   oxyCODONE 5 mg Oral Q4H PRN     Discharge Plan: Referral to Miriam Stewart  Pt [facility]  [facility]  Network Utilization Review Department  [email or web address]  ATTENTION: Please call with any questions or concerns to [phone] and carefully listen to the prompts so that you are directed to the right person   All voicemails are confidential   Celia Estrada all requests for admission clinical reviews, approved or denied determinations and any other requests to dedicated fax number below belonging to the campus where the patient is receiving treatment   List of dedicated fax numbers for the Facilities:  [address] DENIALS (Administrative/Medical Necessity) [phone]   [address]  (Maternity/NICU/Pediatrics) [phone]   Jamie Mullins [phone]   Lin Cleveland [phone]   [facility] [phone]   [address] [place] [address]  [phone]   [address] [phone]   Marcheta Kanner [phone]   [address] [facility] [address]   [address] [address] [phone]

## 2019-12-17 NOTE — PLAN OF CARE
Problem: PHYSICAL THERAPY ADULT  Goal: Performs mobility at highest level of function for planned discharge setting  See evaluation for individualized goals  Outcome: Progressing  Note:   Prognosis: Good  Problem List: Decreased strength, Decreased range of motion, Impaired balance, Decreased endurance, Decreased mobility, Decreased cognition, Pain, Orthopedic restrictions  Assessment: Pt  is a 60 yo F who presents POD 6 R Anterior ELIZABETH  WBAT RLE  Pt  was identified with full name and birthdate  Pt  agreeable to PT session  PT alerted by Orthopedic PA Luisa Lovett that Preoperatively likely determination for patient to be discharged to rehab was made due to lack of social support and current home setup  Barriers to discharge noted on Preoperative assessment were determined to be "Caregiver support and Mothers support " Also documented that, "If possible, the goal is to discharge patient home and for them to attend outpatient PT Jose Winn if discharged home) " Due to current Social Situation and difficulty navigating home with RW, Ortho PA reports questionable safety and fall risk in previous environment  Reports safest means of AD would be cane due to narrow spaces and inability to navigate with RW  Trial with narrow based quad cane performed at Orthopedics request  At this time, patient required Minimal assistance with quad cane x10' and supervision for remaining 30'x of 40'x1 ambulation trial  Pt  Reports fatigue and requests return to room  Postural and gait degradation noted  Pt  Continues to require supervision for transfers and previously demonstrated ability to perform stairs with supervision and bed mobility with supervision   Due to limited home support, inaccessible home environment, and need for Minimal assistance with ambulation at this time, recommend discharge to rehab to allow for increased functional independence and activity tolerance to allow safe return to PLOF with decreased burden of care and decreased fall risk  Barriers to Discharge: Inaccessible home environment  Barriers to Discharge Comments: Due to social barriers, patient unable to fit RW in home, and will require use of different AD for functional mobility  Pt  reports prior to surgery using a folded RW to navigate or no AD at all  At this time, Pt  requires the use of quad cane for functional mobility  Recommendation: Post acute IP rehab     PT - OK to Discharge: Yes    See flowsheet documentation for full assessment

## 2019-12-17 NOTE — PLAN OF CARE
Problem: OCCUPATIONAL THERAPY ADULT  Goal: Performs self-care activities at highest level of function for planned discharge setting  See evaluation for individualized goals  Description  Treatment Interventions: ADL retraining, Functional transfer training, Endurance training, Patient/family training, Equipment evaluation/education, Compensatory technique education, Energy conservation, Activityengagement          See flowsheet documentation for full assessment, interventions and recommendations  Outcome: Progressing  Note:   Limitation: Decreased ADL status, Decreased endurance, Decreased self-care trans, Decreased high-level ADLs  Prognosis: Good  Assessment: Pt was seen this date for OT tx session focusing on self care tasks, tranfers, sit to stand, funcoitnal mobility, fall prevention education, carry over of safe transfer and RW use and overall activity toelrance  Pt presents OOB in chair, completes previously ementioned tasks at docuemetned assist levels, see above  Per Ortho note, preoperativite planning for STR d/c was dicussed due to inaccessabile home enviroment, RW use in the home is not an option due to current home enviroment  Pt demosntrates difficulity with dynamic standing tasks and requires use of RW for stability  Pt contiues to require ocassional min A and increased VC for overall safety  Restingin chair at end of session with all needs in reach  Would benefit from continued OT Tx to improve overall funciotnal abilities  Continue to follow with current POC       OT Discharge Recommendation: Short Term Rehab

## 2019-12-17 NOTE — PROGRESS NOTES
Progress Note - Orthopedics   Misti Deleon 61 y o  female MRN: [id number]  Unit/Bed#: -01      Subjective:    61 y  o female POD #6 R anterior ELIZABETH  No acute events  Pt doing well  Denies other constitutional symptoms      Labs:  0   Lab Value Date/Time    HCT 27 2 (L) 12/16/2019 0511    HCT 25 7 (L) 12/15/2019 0514    HCT 29 2 (L) 12/14/2019 1223    HGB 8 8 (L) 12/16/2019 0511    HGB 8 4 (L) 12/15/2019 0514    HGB 9 6 (L) 12/14/2019 1223    INR 0 99 12/04/2019 1004    WBC 4 81 12/16/2019 0511    WBC 4 86 12/15/2019 0514    WBC 5 78 12/14/2019 0441    CRP 4 8 (H) 12/04/2019 1004       Meds:    Current Facility-Administered Medications:     acetaminophen (TYLENOL) tablet 650 mg, 650 mg, Oral, Q6H PRN, Lavada Blazing, PA-C, 650 mg at 12/15/19 0507    aluminum-magnesium hydroxide-simethicone (MYLANTA) 200-200-20 mg/5 mL oral suspension 30 mL, 30 mL, Oral, Q6H PRN, Lavada Blazing, PA-C    calcium carbonate (TUMS) chewable tablet 1,000 mg, 1,000 mg, Oral, Daily PRN, Lavada Blazing, PA-C, 1,000 mg at 12/14/19 0820    docusate sodium (COLACE) capsule 100 mg, 100 mg, Oral, BID, Lavada Blazing, PA-C, 100 mg at 12/16/19 1717    enoxaparin (LOVENOX) subcutaneous injection 40 mg, 40 mg, Subcutaneous, Daily, Lavada Blazing, PA-C, 40 mg at 12/17/19 0318    gabapentin (NEURONTIN) capsule 100 mg, 100 mg, Oral, Q8H, Lavada Blazing, PA-C, 100 mg at 12/16/19 1513    lactated ringers infusion, 1 5 mL/kg/hr, Intravenous, Continuous, Lavada Blazing, PA-C, Last Rate: 500 mL/hr at 12/13/19 2346, 6 887 mL/kg/hr at 12/13/19 2346    levothyroxine tablet 25 mcg, 25 mcg, Oral, Daily, LINDA Jimenez-WAYNE, 25 mcg at 12/17/19 0526    LORazepam (ATIVAN) tablet 2 mg, 2 mg, Oral, Q8H PRN, LINDA Jimenez-WAYNE, 2 mg at 12/16/19 0944    methocarbamol (ROBAXIN) tablet 750 mg, 750 mg, Oral, Q6H [address], LINDA Jimenez-WAYNE, 750 mg at 12/17/19 0526    ondansetron (ZOFRAN) injection 4 mg, 4 mg, Intravenous, Q6H PRN, Pippa Foster Maria G, PA-C, 4 mg at 12/14/19 0803    oxyCODONE (ROXICODONE) immediate release tablet 10 mg, 10 mg, Oral, Q4H PRN, Meseret Flakes, PA-C, 10 mg at 12/16/19 1513    oxyCODONE (ROXICODONE) IR tablet 5 mg, 5 mg, Oral, Q4H PRN, Meseret Flakes, PA-C    pantoprazole (PROTONIX) EC tablet 40 mg, 40 mg, Oral, Daily, Meseret Flakes, PA-C, 40 mg at 12/16/19 0811    PARoxetine (PAXIL) tablet 60 mg, 60 mg, Oral, QAM, Meseret Flakes, PA-C, 60 mg at 12/16/19 3006    QUEtiapine (SEROquel) tablet 400 mg, 400 mg, Oral, HS, Meseret Flakes, PA-C, 400 mg at 12/16/19 2117    ziprasidone (GEODON) capsule 80 mg, 80 mg, Oral, BID, Meseret Flakes, PA-C, 80 mg at 12/16/19 1717    Blood Culture:   Lab Results   Component Value Date    BLOODCX No Growth After 5 Days  08/13/2018    BLOODCX Staphylococcus coagulase negative (A) 08/13/2018       Wound Culture:   No results found for: WOUNDCULT    Ins and Outs:  I/O last 24 hours: In: 1401 [P O :8340]  Out: 8800 [Urine:8800]          Physical:  Vitals:    12/17/19 0342   BP: 130/75   Pulse: 93   Resp:    Temp:    SpO2: 94%     Musculoskeletal: right Lower Extremity  · Dressing clean and dry  · Calf soft, compressible, nontender  · SILT s/sp/dp/  · +fhl/ehl, +ankle dorsi/plantar flexion  · +PT pulse    _*_*_*_*_*_*_*_*_*_*_*_*_*_*_*_*_*_*_*_*_*_*_*_*_*_*_*_*_*_*_*_*_*_*_*_*_*_*_*_*_*    Assessment:    61 y  o female POD #6 R anterior ELIZABETH     Plan:  · WBAT RLE  · PT/OT  · Pain control  · DVT ppx  · Patient noted to have acute blood loss anemia due to a drop in Hbg of > 2 0g from preop levels, will monitor vital signs and resuscitate with IV fluids as needed  · Dispo: Ortho will follow, d/c planning    Jasiel Mason PA-C

## 2019-12-18 ENCOUNTER — APPOINTMENT (INPATIENT)
Dept: RADIOLOGY | Facility: HOSPITAL | Age: 59
DRG: 470 | End: 2019-12-18
Payer: COMMERCIAL

## 2019-12-18 PROCEDURE — 97116 GAIT TRAINING THERAPY: CPT

## 2019-12-18 PROCEDURE — 73502 X-RAY EXAM HIP UNI 2-3 VIEWS: CPT

## 2019-12-18 PROCEDURE — 99024 POSTOP FOLLOW-UP VISIT: CPT | Performed by: PHYSICIAN ASSISTANT

## 2019-12-18 RX ORDER — XYLITOL/YERBA SANTA
5 AEROSOL, SPRAY WITH PUMP (ML) MUCOUS MEMBRANE 4 TIMES DAILY PRN
Status: DISCONTINUED | OUTPATIENT
Start: 2019-12-18 | End: 2019-12-20 | Stop reason: HOSPADM

## 2019-12-18 RX ADMIN — LORAZEPAM 2 MG: 1 TABLET ORAL at 12:32

## 2019-12-18 RX ADMIN — PAROXETINE 60 MG: 20 TABLET, FILM COATED ORAL at 08:08

## 2019-12-18 RX ADMIN — GABAPENTIN 100 MG: 100 CAPSULE ORAL at 23:08

## 2019-12-18 RX ADMIN — METHOCARBAMOL TABLETS 750 MG: 750 TABLET, COATED ORAL at 18:07

## 2019-12-18 RX ADMIN — METHOCARBAMOL TABLETS 750 MG: 750 TABLET, COATED ORAL at 12:32

## 2019-12-18 RX ADMIN — METHOCARBAMOL TABLETS 750 MG: 750 TABLET, COATED ORAL at 06:18

## 2019-12-18 RX ADMIN — GABAPENTIN 100 MG: 100 CAPSULE ORAL at 06:19

## 2019-12-18 RX ADMIN — PANTOPRAZOLE SODIUM 40 MG: 40 TABLET, DELAYED RELEASE ORAL at 08:08

## 2019-12-18 RX ADMIN — METHOCARBAMOL TABLETS 750 MG: 750 TABLET, COATED ORAL at 23:08

## 2019-12-18 RX ADMIN — ENOXAPARIN SODIUM 40 MG: 40 INJECTION SUBCUTANEOUS at 23:08

## 2019-12-18 RX ADMIN — ZIPRASIDONE HCL 80 MG: 40 CAPSULE ORAL at 08:08

## 2019-12-18 RX ADMIN — OXYCODONE HYDROCHLORIDE 10 MG: 10 TABLET ORAL at 07:09

## 2019-12-18 RX ADMIN — LEVOTHYROXINE SODIUM 25 MCG: 25 TABLET ORAL at 06:19

## 2019-12-18 RX ADMIN — CALCIUM CARBONATE (ANTACID) CHEW TAB 500 MG 1000 MG: 500 CHEW TAB at 16:40

## 2019-12-18 RX ADMIN — GABAPENTIN 100 MG: 100 CAPSULE ORAL at 15:19

## 2019-12-18 RX ADMIN — ZIPRASIDONE HCL 80 MG: 40 CAPSULE ORAL at 18:07

## 2019-12-18 RX ADMIN — QUETIAPINE FUMARATE 400 MG: 100 TABLET ORAL at 23:08

## 2019-12-18 NOTE — PLAN OF CARE
Problem: PHYSICAL THERAPY ADULT  Goal: Performs mobility at highest level of function for planned discharge setting  See evaluation for individualized goals  Outcome: Progressing  Note:   Prognosis: Good  Problem List: Decreased strength, Decreased range of motion, Impaired balance, Decreased endurance, Decreased mobility, Decreased cognition, Pain, Orthopedic restrictions  Assessment: Pt continues to make steady progress with functional mobility this session  Ambulated repeated household distances with use of NBQC during session with min A for safety  No LOB noted, but pt ambulates with shortened, step to gait pattern with guarded posture  Luciano Ashley slightly improved with increased distances, but will benefit from continued use to improve confidence & balance at this time  Pt ambulated on steps as noted above with use of NBQC as she reports having no railings for support to enter home  At conclusion of session, stretcher arrived to take pt for xray  Pt required min A for LE management to get into stretcher  Will continue to benefit from therapy services to progress ambulation & mobility with LRD to allow access to home & community upon return home  Continue with current discharge planning at this time  Will need to be supervision/mod I with use of 1 handed device to allow access to home upon discharge  Barriers to Discharge: Inaccessible home environment  Barriers to Discharge Comments: Due to social barriers, patient unable to fit RW in home, and will require use of different AD for functional mobility  Pt  reports prior to surgery using a folded RW to navigate or no AD at all  At this time, Pt  requires the use of quad cane for functional mobility  Recommendation: Post acute IP rehab     PT - OK to Discharge: Yes    See flowsheet documentation for full assessment

## 2019-12-18 NOTE — PROGRESS NOTES
Internal Medicine Progress Note  Patient: Lacey Segovia  Age/sex: 61 y o  female  Medical Record #: [id number]      ASSESSMENT/PLAN: (Interval History)  Lacey Segovia is seen and examined and management for following issues:    R ELIZABETH 12/11/19  · Pain control per Ortho  · Continue encourage incentive spirometry; monitor fever curve      Post operative blood loss anemia  · s/p 3U PRBCs in total post operatively  · Venofer given x 2   · CBC reviewed hgb stable     Hypothyroid  · Cont supplementation     Schizophrenia disorder  · Cont current medications   · Mood stable     Dispo  · For rehab = pending    Patient stable for DC from medical standpoint  The above assessment and plan was reviewed and updated as determined by my evaluation of the patient on 12/18/2019  Labs:   Results from last 7 days   Lab Units 12/16/19  0511 12/15/19  0514   WBC Thousand/uL 4 81 4 86   HEMOGLOBIN g/dL 8 8* 8 4*   HEMATOCRIT % 27 2* 25 7*   PLATELETS Thousands/uL 224 175     Results from last 7 days   Lab Units 12/16/19  0511 12/15/19  0514  12/11/19  2016   SODIUM mmol/L 137 134*   < >  --    POTASSIUM mmol/L 3 9 4 0   < >  --    CHLORIDE mmol/L 102 99*   < >  --    CO2 mmol/L 32 33*   < >  --    CO2, I-STAT mmol/L  --   --   --  28   BUN mg/dL 6 4*   < >  --    CREATININE mg/dL 0 56* 0 53*   < >  --    GLUCOSE, ISTAT mg/dl  --   --   --  155*   CALCIUM mg/dL 8 9 8 5   < >  --     < > = values in this interval not displayed               Results from last 7 days   Lab Units 12/11/19  2121   POC GLUCOSE mg/dl 163*       Review of Scheduled Meds:    Current Facility-Administered Medications:  acetaminophen 650 mg Oral Q6H PRN LINDA Chan-C    aluminum-magnesium hydroxide-simethicone 30 mL Oral Q6H PRN Frank Mirdorie, PA-C    calcium carbonate 1,000 mg Oral Daily PRN Frank Vaughn, PA-WAYNE    docusate sodium 100 mg Oral BID LINDA Chan-WAYNE    enoxaparin 40 mg Subcutaneous Daily Frank Vaughn PA-C gabapentin 100 mg Oral Q8H Blackmon Sine, PA-C    lactated ringers 1 5 mL/kg/hr Intravenous Continuous Blackmon Sine, PA-C Last Rate: 6 887 mL/kg/hr (12/13/19 2346)   levothyroxine 25 mcg Oral Daily Blackmon Sine, PA-C    LORazepam 2 mg Oral Q8H PRN Blackmon Sine, PA-C    methocarbamol 750 mg Oral Q6H [facility] Blackmon Sine, PA-C    ondansetron 4 mg Intravenous Q6H PRN Blackmon Sine, PA-C    oxyCODONE 10 mg Oral Q4H PRN Blackmon Sine, PA-C    oxyCODONE 5 mg Oral Q4H PRN Blackmon Sine, PA-C    pantoprazole 40 mg Oral Daily Blackmon Sine, PA-C    PARoxetine 60 mg Oral QAM Blackmon Sine, PA-C    QUEtiapine 400 mg Oral HS Blackmon Sine, PA-C    ziprasidone 80 mg Oral BID Blackmon Sine, PA-C        Subjective/ HPI: Patient seen and examined  Patients overnight issues or events were reviewed with nursing or staff during rounds or morning huddle session  New or overnight issues include the following:     Pt did well overnight  Pain is controlled, awaiting decision on rehab  ROS:   A 10 point ROS was performed; negative except as noted above         Imaging:     No orders to display       *Labs /Radiology studiesReviewed  *Medications Reviewed and reconciled as needed  *Please refer to order section for additional ordered labs studies  *Case discussed with primary attending during morning huddle case rounds    Physical Examination:  Vitals:   Vitals:    12/17/19 0715 12/17/19 1525 12/17/19 2357 12/18/19 0701   BP: 133/75 149/95 100/63 142/89   Pulse: 94 89 80 97   Resp: 18 17 18 18   Temp: 98 °F (36 7 °C) 98 3 °F (36 8 °C) 98 4 °F (36 9 °C) 98 4 °F (36 9 °C)   TempSrc:       SpO2: 100% 99% 97% 100%   Weight:       Height:           General Appearance: no distress, conversive  HEENT: PERRLA, conjuctiva normal; oropharynx clear; mucous membranes moist;   Neck:  Supple, no lymphadenopathy or thyromegaly  Lungs: CTA, normal respiratory effort, no retractions, expiratory effort normal  CV: regular rate and rhythm , PMI normal   ABD: soft, obese non tender, no masses , no hepatic or splenomegaly  EXT: DP pulses intact, no lymphadenopathy, no edema; R hip dressing intact  Skin: normal turgor, normal texture, no rash  Psych: affect normal, mood normal  Neuro: AAOx3         The above physical exam was reviewed and updated as determined by my evaluation of the patient on 12/18/2019  Invasive Devices     Peripheral Intravenous Line            Peripheral IV 12/15/19 Left;Proximal;Ventral (anterior) Forearm 3 days                   VTE Pharmacologic Prophylaxis: Enoxaparin (Lovenox)  Code Status: Level 1 - Full Code  Current Length of Stay: 7 day(s)      Total time spent:  30 minutes with more than 50% spent counseling/coordinating care  Counseling includes discussion with patient re: progress  and discussion with patient of his/her current medical state/information  Coordination of patient's care was performed in conjunction with primary service  Time invested included review of patient's labs, vitals, and management of their comorbidities with continued monitoring  In addition, this patient was discussed with medical team including physician and advanced extenders  The care of the patient was extensively discussed and appropriate treatment plan was formulated unique for this patient  ** Please Note:  voice to text software may have been used in the creation of this document   Although proof errors in transcription or interpretation are a potential of such software**

## 2019-12-18 NOTE — PHYSICAL THERAPY NOTE
Physical Therapy Progress Note     12/18/19 1509   Pain Assessment   Pain Assessment 0-10   Pain Score 7   Pain Location Hip;Leg   Pain Orientation Right   Hospital Pain Intervention(s) Repositioned; Ambulation/increased activity; Rest;Elevated   Response to Interventions tolerated   Restrictions/Precautions   RLE Weight Bearing Per Order WBAT   Other Precautions WBS;THR;Pain; Fall Risk   Subjective   Subjective Pt encountered seated in recliner, pleasant and agreeable to treatment  Reports no pain at rest, elevated with weight bearing   "it doesn't hurt in the hip like before, now it's down in the thigh "  Reports being bored in hospital room  Likes to do word searches  Bed Mobility   Sit to Supine 4  Minimal assistance   Additional items Assist x 1   Transfers   Sit to Stand 5  Supervision   Additional items Assist x 1; Armrests; Increased time required   Stand to Sit 5  Supervision   Additional items Assist x 1; Armrests; Increased time required   Toilet transfer 4  Minimal assistance   Additional items Assist x 1   Ambulation/Elevation   Gait pattern Excessively slow; Step to;Short stride;Decreased R stance;Decreased foot clearance; Antalgic; Improper Weight shift  (guarded posture)   Gait Assistance 4  Minimal assist   Additional items Assist x 1   Assistive Device Small base quad cane   Distance 80', 70'   Stair Management Assistance 4  Minimal assist   Additional items Assist x 1   Stair Management Technique No rails; Step to pattern; Foreward  (with NBQC)   Number of Stairs 2   Balance   Static Sitting Fair +   Static Standing Fair   Ambulatory Fair -   Endurance Deficit   Endurance Deficit Yes   Endurance Deficit Description pain, fatigue   Activity Tolerance   Activity Tolerance Patient tolerated treatment well;Patient limited by fatigue;Patient limited by pain   Nurse Made Aware FRANCIA Stovall   Assessment   Prognosis Good   Problem List Decreased strength;Decreased range of motion; Impaired balance;Decreased endurance;Decreased mobility; Decreased cognition;Pain;Orthopedic restrictions   Assessment Pt continues to make steady progress with functional mobility this session  Ambulated repeated household distances with use of NBQC during session with min A for safety  No LOB noted, but pt ambulates with shortened, step to gait pattern with guarded posture  Santa Baker slightly improved with increased distances, but will benefit from continued use to improve confidence & balance at this time  Pt ambulated on steps as noted above with use of NBQC as she reports having no railings for support to enter home  At conclusion of session, stretcher arrived to take pt for xray  Pt required min A for LE management to get into stretcher  Will continue to benefit from therapy services to progress ambulation & mobility with LRD to allow access to home & community upon return home  Continue with current discharge planning at this time  Will need to be supervision/mod I with use of 1 handed device to allow access to home upon discharge  Barriers to Discharge Inaccessible home environment   Barriers to Discharge Comments Due to social barriers, patient unable to fit RW in home, and will require use of different AD for functional mobility  Pt  reports prior to surgery using a folded RW to navigate or no AD at all  At this time, Pt  requires the use of quad cane for functional mobility  Goals   Patient Goals to get out of hospital   STG Expiration Date 12/22/19   PT Treatment Day 8   Plan   Treatment/Interventions Functional transfer training;LE strengthening/ROM; Elevations; Therapeutic exercise; Endurance training;Patient/family training;Equipment eval/education; Bed mobility;Gait training   Progress Progressing toward goals   PT Frequency 7x/wk   Recommendation   Recommendation Post acute IP rehab   Equipment Recommended Other (Comment)  (NBQC)     Actual treatment time:  9063-3777    Mike Hernadez PTA

## 2019-12-18 NOTE — PHYSICAL THERAPY NOTE
Physical Therapy Progress Note     12/18/19 1504   Pain Assessment   Pain Assessment 0-10   Pain Score 7   Pain Location Hip;Leg   Pain Orientation Right   Hospital Pain Intervention(s) Repositioned; Ambulation/increased activity; Rest;Elevated   Response to Interventions tolerated   Restrictions/Precautions   RLE Weight Bearing Per Order WBAT   Other Precautions WBS;THR;Pain; Fall Risk   Subjective   Subjective Pt encountered seated in recliner, pleasant and agreeable to treatment  Reports no pain at rest, elevated with weight bearing   "it doesn't hurt in the hip like before, now it's down in the thigh "  Reports being bored in hospital room  Likes to do word searches  Bed Mobility   Sit to Supine 4  Minimal assistance   Additional items Assist x 1   Transfers   Sit to Stand 5  Supervision   Additional items Assist x 1; Armrests; Increased time required   Stand to Sit 5  Supervision   Additional items Assist x 1; Armrests; Increased time required   Toilet transfer 4  Minimal assistance   Additional items Assist x 1   Ambulation/Elevation   Gait pattern Excessively slow; Step to;Short stride;Decreased R stance;Decreased foot clearance; Antalgic; Improper Weight shift  (guarded posture)   Gait Assistance 4  Minimal assist   Additional items Assist x 1   Assistive Device Small base quad cane   Distance 80', 70'   Stair Management Assistance 4  Minimal assist   Additional items Assist x 1   Stair Management Technique No rails; Step to pattern; Foreward  (with NBQC)   Number of Stairs 2   Balance   Static Sitting Fair +   Static Standing Fair   Ambulatory Fair -   Endurance Deficit   Endurance Deficit Yes   Endurance Deficit Description pain, fatigue   Activity Tolerance   Activity Tolerance Patient tolerated treatment well;Patient limited by fatigue;Patient limited by pain   Nurse Made Aware FRANCIA Stovall   Assessment   Prognosis Good   Problem List Decreased strength;Decreased range of motion; Impaired balance;Decreased endurance;Decreased mobility; Decreased cognition;Pain;Orthopedic restrictions   Assessment Pt continues to make steady progress with functional mobility this session  Ambulated repeated household distances with use of NBQC during session with min A for safety  No LOB noted, but pt ambulates with shortened, step to gait pattern with guarded posture  Isis White slightly improved with increased distances, but will benefit from continued use to improve confidence & balance at this time  Pt ambulated on steps as noted above with use of NBQC as she reports having no railings for support to enter home  At conclusion of session, stretcher arrived to take pt for xray  Pt required min A for LE management to get into stretcher  Will continue to benefit from therapy services to progress ambulation & mobility with LRD to allow access to home & community upon return home  Continue with current discharge planning at this time  Will need to be supervision/mod I with use of 1 handed device to allow access to home upon discharge  Barriers to Discharge Inaccessible home environment   Barriers to Discharge Comments Due to social barriers, patient unable to fit RW in home, and will require use of different AD for functional mobility  Pt  reports prior to surgery using a folded RW to navigate or no AD at all  At this time, Pt  requires the use of quad cane for functional mobility  Goals   Patient Goals to get out of hospital   STG Expiration Date 12/22/19   PT Treatment Day 8   Plan   Treatment/Interventions Functional transfer training;LE strengthening/ROM; Elevations; Therapeutic exercise; Endurance training;Patient/family training;Equipment eval/education; Bed mobility;Gait training   Progress Progressing toward goals   PT Frequency 7x/wk   Recommendation   Recommendation Post acute IP rehab   Equipment Recommended Other (Comment)  (NBQC)     Maggie Place, PTA

## 2019-12-18 NOTE — PROGRESS NOTES
Orthopedics   [place] 61 y o  female MRN: [id number]  Unit/Bed#: -01      Subjective:  61 y  o female post operative day 7 right anterior total hip arthroplasty  Pt doing well  Pain controlled  Patient had difficulty ambulating with a cane yesterday, but will be unable to use a walker at home due to her home environment  Patient states that she had some looser stools yesterday, but that that was the first time she had a bowel movement since surgery  No overnight events  No acute complaints       Labs:  0   Lab Value Date/Time    HCT 27 2 (L) 12/16/2019 0511    HCT 25 7 (L) 12/15/2019 0514    HCT 29 2 (L) 12/14/2019 1223    HGB 8 8 (L) 12/16/2019 0511    HGB 8 4 (L) 12/15/2019 0514    HGB 9 6 (L) 12/14/2019 1223    INR 0 99 12/04/2019 1004    WBC 4 81 12/16/2019 0511    WBC 4 86 12/15/2019 0514    WBC 5 78 12/14/2019 0441    CRP 4 8 (H) 12/04/2019 1004       Meds:    Current Facility-Administered Medications:     acetaminophen (TYLENOL) tablet 650 mg, 650 mg, Oral, Q6H PRN, Blackmon Sine, PA-C, 650 mg at 12/15/19 0507    aluminum-magnesium hydroxide-simethicone (MYLANTA) 200-200-20 mg/5 mL oral suspension 30 mL, 30 mL, Oral, Q6H PRN, Blackmon Sine, PA-C    calcium carbonate (TUMS) chewable tablet 1,000 mg, 1,000 mg, Oral, Daily PRN, Blackmon Sine, PA-C, 1,000 mg at 12/14/19 0820    docusate sodium (COLACE) capsule 100 mg, 100 mg, Oral, BID, Blackmon Sine, PA-C, 100 mg at 12/17/19 0801    enoxaparin (LOVENOX) subcutaneous injection 40 mg, 40 mg, Subcutaneous, Daily, Blackmon Sine, PA-C, 40 mg at 12/17/19 2316    gabapentin (NEURONTIN) capsule 100 mg, 100 mg, Oral, Q8H, Blackmon Sine, PA-C, 100 mg at 12/17/19 2316    lactated ringers infusion, 1 5 mL/kg/hr, Intravenous, Continuous, LINDA Munoz-WAYNE, Last Rate: 500 mL/hr at 12/13/19 2346, 6 887 mL/kg/hr at 12/13/19 2346    levothyroxine tablet 25 mcg, 25 mcg, Oral, Daily, Neymar Murray PA-C, 25 mcg at 12/17/19 0577   LORazepam (ATIVAN) tablet 2 mg, 2 mg, Oral, Q8H PRN, Corinda Relic, PA-C, 2 mg at 12/17/19 1205    methocarbamol (ROBAXIN) tablet 750 mg, 750 mg, Oral, Q6H [address], Corinda Relic, PA-C, 750 mg at 12/17/19 2316    ondansetron (ZOFRAN) injection 4 mg, 4 mg, Intravenous, Q6H PRN, Corinda Relic, PA-C, 4 mg at 12/14/19 0803    oxyCODONE (ROXICODONE) immediate release tablet 10 mg, 10 mg, Oral, Q4H PRN, Corinda Relic, PA-C, 10 mg at 12/16/19 1513    oxyCODONE (ROXICODONE) IR tablet 5 mg, 5 mg, Oral, Q4H PRN, Corinda Relic, PA-C    pantoprazole (PROTONIX) EC tablet 40 mg, 40 mg, Oral, Daily, Corinda Relic, PA-C, 40 mg at 12/17/19 0801    PARoxetine (PAXIL) tablet 60 mg, 60 mg, Oral, QAM, Corinda Relic, PA-C, 60 mg at 12/17/19 0801    QUEtiapine (SEROquel) tablet 400 mg, 400 mg, Oral, HS, Corinda Relic, PA-C, 400 mg at 12/17/19 2316    ziprasidone (GEODON) capsule 80 mg, 80 mg, Oral, BID, Corinda Relic, PA-C, 80 mg at 12/17/19 1701    Blood Culture:   Lab Results   Component Value Date    BLOODCX No Growth After 5 Days  08/13/2018    BLOODCX Staphylococcus coagulase negative (A) 08/13/2018       Wound Culture:   No results found for: WOUNDCULT    Ins and Outs:  I/O last 24 hours: In: 7347 [P O :7560]  Out: 2700 [Urine:2700]          Physical Exam:  Vitals:    12/17/19 2357   BP: 100/63   Pulse: 80   Resp: 18   Temp: 98 4 °F (36 9 °C)   SpO2: 97%     right lower extremity:  · Sensation intact L2-S1  · Motor intact L2-S1  · 2+ dorsalis pedis     _*_*_*_*_*_*_*_*_*_*_*_*_*_*_*_*_*_*_*_*_*_*_*_*_*_*_*_*_*_*_*_*_*_*_*_*_*_*_*_*_*    Assessment: 61 y  o female post operative day 7 right anterior total hip arthroplasty   Doing well    Plan:  · Weight Bearing as tolerated  · Up and out of bed  · Anterior hip precautions  · DVT prophylaxis  · Analgesics  · PT/OT  · Discharge pending  · Considering placement due to significant barriers for home discharge   · PT working on functional independence for patient with cane, as this is a requirement for discharge  · Patient noted to have acute blood loss anemia due to a drop in Hbg of > 2 0g from preop levels, will monitor vital signs and resuscitate with IV fluids as needed   · Recovering currently    Mike Pearl PA-C

## 2019-12-19 PROBLEM — Z96.641 STATUS POST RIGHT HIP REPLACEMENT: Status: ACTIVE | Noted: 2019-12-19

## 2019-12-19 PROCEDURE — 99024 POSTOP FOLLOW-UP VISIT: CPT | Performed by: PHYSICIAN ASSISTANT

## 2019-12-19 RX ORDER — GABAPENTIN 100 MG/1
100 CAPSULE ORAL EVERY 8 HOURS
Qty: 90 CAPSULE | Refills: 0
Start: 2019-12-19 | End: 2019-12-24 | Stop reason: SDUPTHER

## 2019-12-19 RX ORDER — OXYCODONE HYDROCHLORIDE 5 MG/1
5 TABLET ORAL EVERY 4 HOURS PRN
Qty: 30 TABLET | Refills: 0
Start: 2019-12-19 | End: 2020-01-14 | Stop reason: HOSPADM

## 2019-12-19 RX ORDER — DOCUSATE SODIUM 100 MG/1
100 CAPSULE, LIQUID FILLED ORAL 2 TIMES DAILY
Qty: 10 CAPSULE | Refills: 0
Start: 2019-12-20 | End: 2020-01-14 | Stop reason: HOSPADM

## 2019-12-19 RX ORDER — ACETAMINOPHEN 325 MG/1
650 TABLET ORAL EVERY 6 HOURS PRN
Qty: 30 TABLET | Refills: 0
Start: 2019-12-19 | End: 2020-03-30 | Stop reason: ALTCHOICE

## 2019-12-19 RX ORDER — METHOCARBAMOL 750 MG/1
750 TABLET, FILM COATED ORAL EVERY 6 HOURS SCHEDULED
Qty: 30 TABLET | Refills: 0
Start: 2019-12-20 | End: 2020-03-30 | Stop reason: ALTCHOICE

## 2019-12-19 RX ADMIN — PAROXETINE 60 MG: 20 TABLET, FILM COATED ORAL at 08:05

## 2019-12-19 RX ADMIN — GABAPENTIN 100 MG: 100 CAPSULE ORAL at 07:08

## 2019-12-19 RX ADMIN — ZIPRASIDONE HCL 80 MG: 40 CAPSULE ORAL at 17:01

## 2019-12-19 RX ADMIN — QUETIAPINE FUMARATE 400 MG: 100 TABLET ORAL at 23:17

## 2019-12-19 RX ADMIN — METHOCARBAMOL TABLETS 750 MG: 750 TABLET, COATED ORAL at 23:17

## 2019-12-19 RX ADMIN — METHOCARBAMOL TABLETS 750 MG: 750 TABLET, COATED ORAL at 05:35

## 2019-12-19 RX ADMIN — OXYCODONE HYDROCHLORIDE 10 MG: 10 TABLET ORAL at 04:52

## 2019-12-19 RX ADMIN — LEVOTHYROXINE SODIUM 25 MCG: 25 TABLET ORAL at 05:35

## 2019-12-19 RX ADMIN — METHOCARBAMOL TABLETS 750 MG: 750 TABLET, COATED ORAL at 17:01

## 2019-12-19 RX ADMIN — GABAPENTIN 100 MG: 100 CAPSULE ORAL at 15:10

## 2019-12-19 RX ADMIN — PANTOPRAZOLE SODIUM 40 MG: 40 TABLET, DELAYED RELEASE ORAL at 08:05

## 2019-12-19 RX ADMIN — ENOXAPARIN SODIUM 40 MG: 40 INJECTION SUBCUTANEOUS at 23:17

## 2019-12-19 RX ADMIN — METHOCARBAMOL TABLETS 750 MG: 750 TABLET, COATED ORAL at 11:31

## 2019-12-19 RX ADMIN — LORAZEPAM 2 MG: 1 TABLET ORAL at 05:05

## 2019-12-19 RX ADMIN — ZIPRASIDONE HCL 80 MG: 40 CAPSULE ORAL at 08:04

## 2019-12-19 RX ADMIN — GABAPENTIN 100 MG: 100 CAPSULE ORAL at 23:17

## 2019-12-19 NOTE — PROGRESS NOTES
Orthopedics Progress / Post-op Note  Keri Vivar 61 y o  female MRN: [id number]  Unit/Bed#: -01      Subjective:  61 y  o female post operative day 8 s/p  right anterior total hip arthroplasty  No acute issues overnight  Complains of right thigh soreness mostly controlled with pain medications        Objective:    Labs:  0   Lab Value Date/Time    HCT 27 2 (L) 12/16/2019 0511    HCT 25 7 (L) 12/15/2019 0514    HCT 29 2 (L) 12/14/2019 1223    HGB 8 8 (L) 12/16/2019 0511    HGB 8 4 (L) 12/15/2019 0514    HGB 9 6 (L) 12/14/2019 1223    INR 0 99 12/04/2019 1004    WBC 4 81 12/16/2019 0511    WBC 4 86 12/15/2019 0514    WBC 5 78 12/14/2019 0441    CRP 4 8 (H) 12/04/2019 1004       Meds:    Current Facility-Administered Medications:     acetaminophen (TYLENOL) tablet 650 mg, 650 mg, Oral, Q6H PRN, Piyush Valdovinos PA-C, 650 mg at 12/15/19 0507    aluminum-magnesium hydroxide-simethicone (MYLANTA) 200-200-20 mg/5 mL oral suspension 30 mL, 30 mL, Oral, Q6H PRN, Piyush Valdovinos PA-C    calcium carbonate (TUMS) chewable tablet 1,000 mg, 1,000 mg, Oral, Daily PRN, Piyush Valdovinos, PA-C, 1,000 mg at 12/18/19 1640    docusate sodium (COLACE) capsule 100 mg, 100 mg, Oral, BID, Piyush Valdovinos, PA-C, 100 mg at 12/17/19 0801    enoxaparin (LOVENOX) subcutaneous injection 40 mg, 40 mg, Subcutaneous, Daily, Piyush Valdovinos PA-C, 40 mg at 12/18/19 2308    gabapentin (NEURONTIN) capsule 100 mg, 100 mg, Oral, Q8H, Piyush Valdovinos, PA-C, 100 mg at 12/19/19 0708    lactated ringers infusion, 1 5 mL/kg/hr, Intravenous, Continuous, LINDA Paniagua-C, Last Rate: 500 mL/hr at 12/13/19 2346, 6 887 mL/kg/hr at 12/13/19 2346    levothyroxine tablet 25 mcg, 25 mcg, Oral, Daily, LINDA Paniagua-WAYNE, 25 mcg at 12/19/19 0535    LORazepam (ATIVAN) tablet 2 mg, 2 mg, Oral, Q8H PRN, LINDA Paniagua-WAYNE, 2 mg at 12/19/19 0505    methocarbamol (ROBAXIN) tablet 750 mg, 750 mg, Oral, Q6H [address], LINDA Paniagau-WAYNE, 750 mg at 12/19/19 0535    ondansetron (ZOFRAN) injection 4 mg, 4 mg, Intravenous, Q6H PRN, Conner Mansi, PA-C, 4 mg at 12/14/19 0803    oxyCODONE (ROXICODONE) immediate release tablet 10 mg, 10 mg, Oral, Q4H PRN, Conner Mansi, PA-C, 10 mg at 12/19/19 0452    oxyCODONE (ROXICODONE) IR tablet 5 mg, 5 mg, Oral, Q4H PRN, Conner Mansi, PA-C    pantoprazole (PROTONIX) EC tablet 40 mg, 40 mg, Oral, Daily, Conner Mansi, PA-C, 40 mg at 12/18/19 0808    PARoxetine (PAXIL) tablet 60 mg, 60 mg, Oral, QAM, Conenr Mansi, PA-C, 60 mg at 12/18/19 0808    QUEtiapine (SEROquel) tablet 400 mg, 400 mg, Oral, HS, Conner Mansi, PA-C, 400 mg at 12/18/19 2308    saliva substitute (MOUTH KOTE) mucosal solution 5 spray, 5 spray, Mouth/Throat, 4x Daily PRN, Romenayelia Done, DO    ziprasidone (GEODON) capsule 80 mg, 80 mg, Oral, BID, Conner Mansi, PA-C, 80 mg at 12/18/19 1807    Blood Culture:   Lab Results   Component Value Date    BLOODCX No Growth After 5 Days  08/13/2018    BLOODCX Staphylococcus coagulase negative (A) 08/13/2018       Wound Culture:   No results found for: WOUNDCULT    Ins and Outs:  I/O last 24 hours: In: 2360 [P O :2360]  Out: 1000 [Urine:1000]      Orthopedic Physical Exam:  Vitals:    12/19/19 0717   BP: 126/82   Pulse: 104   Resp: 18   Temp: 98 3 °F (36 8 °C)   SpO2: 99%   Sitting upright in NAD  Comfortable at rest, breathing unlabored  right Lower Extremity extremity:  · Dressings C/D/I, no saturation  · Able to flex hip and extend knee  · +ankle DF/PF, EHL/FHL  · Sensation intact  · Feet warm and well perfused  · Palpable dorsalis pedis pulse  · No calf tenderness or pitting edema    _*_*_*_*_*_*_*_*_*_*_*_*_*_*_*_*_*_*_*_*_*_*_*_*_*_*_*_*_*_*_*_*_*_*_*_*_*_*_*_*_*    Assessment: 61 y  o female post operative day 8 s/p  right anterior total hip arthroplasty        Plan:  · WBAT RLE  · Anterior hip precautions  · DVT prophylaxis (Lovenox)  · Analgesics  · PT/OT  · Dispo: Ortho will follow  · Patient noted to have acute blood loss anemia due to a drop in Hbg of > 2 0g from preop levels, will monitor vital signs and resuscitate with IV fluids as needed        Tess Pool PA-C

## 2019-12-19 NOTE — DISCHARGE INSTRUCTIONS
Discharge Instructions - Orthopedics  Keny Age 61 y o  female MRN: [id number]  Unit/Bed#: -01    Weight Bearing Status:                                           Weight Bearing as tolerated to the right lower extremity  DVT prophylaxis:  Complete course of Lovenox as directed    Pain:  Continue analgesics as directed    Showering Instructions:   Can shower with dressing on    Dressing Instructions:   Keep dressing clean, dry and intact until follow up appointment  Driving Instructions:  No driving until cleared by Orthopaedic Surgery  PT/OT:  Continue PT/OT on outpatient basis as directed    Appt Instructions: If you do not have your appointment, please call the clinic at [phone] in 1 week with Dr Solares Been  Otherwise followup as scheduled    Contact the office sooner if you experience any increased numbness/tingling in the extremities

## 2019-12-19 NOTE — DISCHARGE SUMMARY
ORTHOPEDICS DISCHARGE SUMMARY   Jennifer Gonzales 61 y o  female MRN: [id number]  Unit/Bed#: -01      Attending Physician: Inocencio Bello    Admitting diagnosis: Primary osteoarthritis of right hip [M16 11]    Discharge diagnosis:  Status post right hip replacement    Date of admission: 12/11/2019    Date of discharge: 12/20/2019    Procedure: Jayesh FERGUSON  This is a 61y o  year old female that presented to the office with signs and symptoms of right hip osteoarthritis  They tried and failed conservative treatment measures and wished to proceed with surgical intervention  The risks, benefits, and complications of the procedure were discussed with the patient and informed consent was obtained  Hospital Course: The patient was admitted to the hospital on 12/11/2019 and underwent an uncomplicated right total hip arthroplasty  They were transferred to the floor after a brief stay in the post-anesthesia care unit  Their pain was well managed with IV and oral pain medications  They began therapy on post operative day #1  Lovenox was also started for DVT prophylaxis 12 hours post op  Patient required a longer length of stay, due to having a psychiatric past medical history with an admission to the psych unit less than 2 years ago  This made her a target, so we had to get psychiatric clearanceas well as the [facility] letter of determination  On discharge date pt was cleared by PT and the medicine team and determined to be safe for discharge  Daily discussion was had with the patient, nursing staff, orthopaedic team, and family members if present  All questions were answered to the patients satisifaction      0   Lab Value Date/Time    HGB 8 8 (L) 12/16/2019 0511    HGB 8 4 (L) 12/15/2019 0514    HGB 9 6 (L) 12/14/2019 1223    HGB 7 8 (L) 12/14/2019 0441    HGB 9 3 (L) 12/13/2019 0443    HGB 9 5 (L) 12/12/2019 1806    HGB 7 8 (L) 12/12/2019 1024    HGB 6 4 (LL) 12/12/2019 0441    HGB 8 4 (L) 12/11/2019 2138 HGB 9 2 (L) 12/11/2019 2016    HGB 11 2 (L) 12/04/2019 1004    HGB 9 6 (L) 11/14/2019 1333    HGB 11 6 05/20/2019 2314    HGB 10 8 (L) 05/05/2019 0506    HGB 11 8 05/04/2019 2046    HGB 12 2 11/01/2018 2139    HGB 12 2 10/23/2018 1602    HGB 11 3 (L) 08/21/2018 0627    HGB 10 8 (L) 08/20/2018 0628    HGB 10 6 (L) 08/19/2018 0509    HGB 10 4 (L) 08/18/2018 0540    HGB 10 1 (L) 08/17/2018 0500    HGB 10 7 (L) 08/16/2018 0338    HGB 10 2 (L) 08/15/2018 0652    HGB 13 7 08/14/2018 0930    HGB 12 3 08/14/2018 0438    HGB 14 0 08/13/2018 1707    HGB 13 3 08/13/2018 0842    HGB 13 5 08/13/2018 0718    HGB 14 3 08/13/2018 0708    HGB 13 6 08/13/2018 0708    HGB 8 2 (L) 09/27/2016 0506    HGB 8 1 (L) 09/26/2016 0819    HGB 8 9 (L) 09/25/2016 2039    HGB 20 1 (HH) 09/25/2016 2005    HGB 9 1 (L) 09/24/2016 1000    HGB 8 8 (L) 05/13/2016 1823       Acute blood loss anemia, as evidenced by greater than 2g droop of hgb  Vital signs remained stable and pt was resuscitated with IVF as needed   Body mass index is 28 52 kg/m²  Discharge Instructions: The patient was discharged weight bearing as tolerated to the right lower extremity  Lovenox will be continued for 28 days  Continue PT/OT  Take pain medications as instructed  Discharge Medications: For the complete list of discharge medications, please refer to the patient's medication reconciliation

## 2019-12-19 NOTE — SOCIAL WORK
CM received Letter of Determination and uploaded into [facility]; sent to [facility]  Cm spoke with Elsa Rodríguez at [employer] ([phone]) to submit for Lizzy Duran  Faxed clinicals to [employer] [phone])  AUTH ID # [id number] KIFOQ  Await AUTH for [facility]      5:19pm  AUTH Approval # [id number] for 5 days starting today  Level One 583min/wk Next review due 12/23 [employer] phone [phone] Fax [phone]  CM notified [facility] who will accept today  CM notified ortho, pt and pt's nurse  CM spoke with pt and explained cost of wc van and pt was agreeable  CM calling for [facility] tonight:  Radha Trujillo, Feng Le, [address] all reported NO availability  Left message for [facility]  5:38pm Pt called CM and is resisting dc tonight as she feels rushed  CM notified ortho and arranged [address] with Radha Trujillo for tomorrow at 2pm   CM notified [facility]      6:29pm CM spent time with pt and BODØ from ortho addressing pt's concerns  Pt and BODØ from Ortho will determine a dc plan tomorrow  Cm to follow if plan changes to a home plan

## 2019-12-19 NOTE — PROGRESS NOTES
Internal Medicine Progress Note  Patient: Misti Deleon  Age/sex: 61 y o  female  Medical Record #: [id number]      ASSESSMENT/PLAN: (Interval History)  Misti Deleon is seen and examined and management for following issues:    R ELIZABETH 12/11/19  · Pain control per Ortho  · Increased pain 12/18, Xrays negative  · Continue encourage incentive spirometry; monitor fever curve      Post operative blood loss anemia  · s/p 3U PRBCs in total post operatively  · Venofer given x 2   ·  hgb stable     Hypothyroid  · Cont supplementation     Schizophrenia disorder  · Cont current medications   · Mood stable     Dispo  · For rehab = pending    Patient stable for DC from medical standpoint  The above assessment and plan was reviewed and updated as determined by my evaluation of the patient on 12/19/2019      Labs:   Results from last 7 days   Lab Units 12/16/19  0511 12/15/19  0514   WBC Thousand/uL 4 81 4 86   HEMOGLOBIN g/dL 8 8* 8 4*   HEMATOCRIT % 27 2* 25 7*   PLATELETS Thousands/uL 224 175     Results from last 7 days   Lab Units 12/16/19  0511 12/15/19  0514   SODIUM mmol/L 137 134*   POTASSIUM mmol/L 3 9 4 0   CHLORIDE mmol/L 102 99*   CO2 mmol/L 32 33*   BUN mg/dL 6 4*   CREATININE mg/dL 0 56* 0 53*   CALCIUM mg/dL 8 9 8 5                   Review of Scheduled Meds:    Current Facility-Administered Medications:  acetaminophen 650 mg Oral Q6H PRN Lavada Blazing, PA-C    aluminum-magnesium hydroxide-simethicone 30 mL Oral Q6H PRN Lavada Blazing, PA-C    calcium carbonate 1,000 mg Oral Daily PRN Lavada Blazing, PA-C    docusate sodium 100 mg Oral BID Lavada Blazing, PA-C    enoxaparin 40 mg Subcutaneous Daily Lavada Blazing, PA-C    gabapentin 100 mg Oral Q8H Lavada Blazing, PA-C    lactated ringers 1 5 mL/kg/hr Intravenous Continuous Lavada Blazing, PA-C Last Rate: 6 887 mL/kg/hr (12/13/19 2340)   levothyroxine 25 mcg Oral Daily Lavada Blazing, PA-C    LORazepam 2 mg Oral Q8H PRN Lavada Blazing, PA-C    methocarbamol 750 mg Oral Q6H [address] Klaus Guest, PA-C    ondansetron 4 mg Intravenous Q6H PRN Klaus Guest, PA-C    oxyCODONE 10 mg Oral Q4H PRN Klaus Guest, PA-C    oxyCODONE 5 mg Oral Q4H PRN Klaus Guest, PA-C    pantoprazole 40 mg Oral Daily Klaus Guest, PA-C    PARoxetine 60 mg Oral QAM Klaus Guest, PA-C    QUEtiapine 400 mg Oral HS Klaus Guest, PA-C    saliva substitute 5 spray Mouth/Throat 4x Daily PRN Miachel Sit, DO    ziprasidone 80 mg Oral BID Klaus Guest, PA-C        Subjective/ HPI: Patient seen and examined  Patients overnight issues or events were reviewed with nursing or staff during rounds or morning huddle session  New or overnight issues include the following:     Pt did well overnight  Pain is better controlled  ROS:   A 10 point ROS was performed; negative except as noted above  Imaging:     XR hip/pelv 2-3 vws right if performed   Final Result by Berna Paz MD (12/18 1252)      Unremarkable appearance of the right hip following total joint replacement                    Workstation performed: [id number]             *Labs /Radiology studiesReviewed  *Medications Reviewed and reconciled as needed  *Please refer to order section for additional ordered labs studies  *Case discussed with primary attending during morning huddle case rounds    Physical Examination:  Vitals:   Vitals:    12/18/19 2306 12/19/19 0455 12/19/19 0600 12/19/19 0717   BP: 141/83 134/84  126/82   BP Location: Right arm      Pulse: 104 (!) 117  104   Resp: 18   18   Temp: (!) 97 2 °F (36 2 °C)   98 3 °F (36 8 °C)   TempSrc: Oral      SpO2: 98% 99%  99%   Weight:   74 2 kg (163 lb 9 3 oz)    Height:           General Appearance: no distress, conversive  HEENT: PERRLA, conjuctiva normal; oropharynx clear; mucous membranes moist;   Neck:  Supple, no lymphadenopathy or thyromegaly  Lungs: CTA, normal respiratory effort, no retractions, expiratory effort normal  CV: regular rate and rhythm , PMI normal   ABD: soft non tender, no masses , no hepatic or splenomegaly  EXT: DP pulses intact, no lymphadenopathy, no edema  Skin: normal turgor, normal texture, no rash  Psych: affect normal, mood normal  Neuro: AAOx3            The above physical exam was reviewed and updated as determined by my evaluation of the patient on 12/19/2019  Invasive Devices     Peripheral Intravenous Line            Peripheral IV 12/15/19 Left;Proximal;Ventral (anterior) Forearm 4 days                   VTE Pharmacologic Prophylaxis: Enoxaparin (Lovenox)  Code Status: Level 1 - Full Code  Current Length of Stay: 8 day(s)      Total time spent:  30 minutes with more than 50% spent counseling/coordinating care  Counseling includes discussion with patient re: progress  and discussion with patient of his/her current medical state/information  Coordination of patient's care was performed in conjunction with primary service  Time invested included review of patient's labs, vitals, and management of their comorbidities with continued monitoring  In addition, this patient was discussed with medical team including physician and advanced extenders  The care of the patient was extensively discussed and appropriate treatment plan was formulated unique for this patient  ** Please Note:  voice to text software may have been used in the creation of this document   Although proof errors in transcription or interpretation are a potential of such software**

## 2019-12-20 ENCOUNTER — PATIENT OUTREACH (OUTPATIENT)
Dept: INTERNAL MEDICINE CLINIC | Facility: CLINIC | Age: 59
End: 2019-12-20

## 2019-12-20 PROCEDURE — NC001 PR NO CHARGE: Performed by: ORTHOPAEDIC SURGERY

## 2019-12-20 PROCEDURE — 97116 GAIT TRAINING THERAPY: CPT

## 2019-12-20 PROCEDURE — 97530 THERAPEUTIC ACTIVITIES: CPT

## 2019-12-20 PROCEDURE — 99024 POSTOP FOLLOW-UP VISIT: CPT | Performed by: PHYSICIAN ASSISTANT

## 2019-12-20 PROCEDURE — 97535 SELF CARE MNGMENT TRAINING: CPT

## 2019-12-20 RX ORDER — OXYCODONE HYDROCHLORIDE 5 MG/1
5 TABLET ORAL EVERY 6 HOURS PRN
Qty: 20 TABLET | Refills: 0
Start: 2019-12-20 | End: 2020-01-14 | Stop reason: HOSPADM

## 2019-12-20 RX ADMIN — LEVOTHYROXINE SODIUM 25 MCG: 25 TABLET ORAL at 05:55

## 2019-12-20 RX ADMIN — PAROXETINE 60 MG: 20 TABLET, FILM COATED ORAL at 08:27

## 2019-12-20 RX ADMIN — ZIPRASIDONE HCL 80 MG: 40 CAPSULE ORAL at 08:27

## 2019-12-20 RX ADMIN — METHOCARBAMOL TABLETS 750 MG: 750 TABLET, COATED ORAL at 11:43

## 2019-12-20 RX ADMIN — LORAZEPAM 2 MG: 1 TABLET ORAL at 05:55

## 2019-12-20 RX ADMIN — METHOCARBAMOL TABLETS 750 MG: 750 TABLET, COATED ORAL at 05:55

## 2019-12-20 RX ADMIN — GABAPENTIN 100 MG: 100 CAPSULE ORAL at 05:55

## 2019-12-20 RX ADMIN — PANTOPRAZOLE SODIUM 40 MG: 40 TABLET, DELAYED RELEASE ORAL at 08:28

## 2019-12-20 NOTE — PLAN OF CARE
Problem: Potential for Falls  Goal: Patient will remain free of falls  Description  INTERVENTIONS:  - Assess patient frequently for physical needs  -  Identify cognitive and physical deficits and behaviors that affect risk of falls    -  [place] fall precautions as indicated by assessment   - Educate patient/family on patient safety including physical limitations  - Instruct patient to call for assistance with activity based on assessment  - Modify environment to reduce risk of injury  - Consider OT/PT consult to assist with strengthening/mobility  Outcome: Adequate for Discharge     Problem: PAIN - ADULT  Goal: Verbalizes/displays adequate comfort level or baseline comfort level  Description  Interventions:  - Encourage patient to monitor pain and request assistance  - Assess pain using appropriate pain scale  - Administer analgesics based on type and severity of pain and evaluate response  - Implement non-pharmacological measures as appropriate and evaluate response  - Consider cultural and social influences on pain and pain management  - Notify physician/advanced practitioner if interventions unsuccessful or patient reports new pain  Outcome: Adequate for Discharge     Problem: INFECTION - ADULT  Goal: Absence or prevention of progression during hospitalization  Description  INTERVENTIONS:  - Assess and monitor for signs and symptoms of infection  - Monitor lab/diagnostic results  - Monitor all insertion sites, i e  indwelling lines  - Monitor endotracheal if appropriate and nasal secretions for changes in amount and color  - [place] appropriate cooling/warming therapies per order  - Administer medications as ordered  - Instruct and encourage patient and family to use good hand hygiene technique  - Identify and instruct in appropriate isolation precautions for identified infection/condition   Outcome: Adequate for Discharge  Goal: Absence of fever/infection during neutropenic period  Description  INTERVENTIONS:  - Monitor WBC    Outcome: Adequate for Discharge     Problem: SAFETY ADULT  Goal: Maintain or return to baseline ADL function  Description  INTERVENTIONS:  -  Assess patient's ability to carry out ADLs; assess patient's baseline for ADL function and identify physical deficits which impact ability to perform ADLs (bathing, care of mouth/teeth, toileting, grooming, dressing, etc )  - Assess/evaluate cause of self-care deficits   - Assess range of motion  - Assess patient's mobility; develop plan if impaired  - Assess patient's need for assistive devices and provide as appropriate  - Encourage maximum independence but intervene and supervise when necessary  - Involve family in performance of ADLs  - Assess for home care needs following discharge   - Consider OT consult to assist with ADL evaluation and planning for discharge  - Provide patient education as appropriate  Outcome: Adequate for Discharge  Goal: Maintain or return mobility status to optimal level  Description  INTERVENTIONS:  - Assess patient's baseline mobility status (ambulation, transfers, stairs, etc )    - Identify cognitive and physical deficits and behaviors that affect mobility  - Identify mobility aids required to assist with transfers and/or ambulation (gait belt, sit-to-stand, lift, walker, cane, etc )  - [place] fall precautions as indicated by assessment  - Record patient progress and toleration of activity level on Mobility SBAR; progress patient to next Phase/Stage  - Instruct patient to call for assistance with activity based on assessment  - Consider rehabilitation consult to assist with strengthening/weightbearing, etc   Outcome: Adequate for Discharge     Problem: DISCHARGE PLANNING  Goal: Discharge to home or other facility with appropriate resources  Description  INTERVENTIONS:  - Identify barriers to discharge w/patient and caregiver  - Arrange for needed discharge resources and transportation as appropriate  - Identify discharge learning needs (meds, wound care, etc )  - Refer to Case Management Department for coordinating discharge planning if the patient needs post-hospital services based on physician/advanced practitioner order or complex needs related to functional status, cognitive ability, or social support system   Outcome: Adequate for Discharge     Problem: Knowledge Deficit  Goal: Patient/family/caregiver demonstrates understanding of disease process, treatment plan, medications, and discharge instructions  Description  Complete learning assessment and assess knowledge base    Interventions:  - Provide teaching at level of understanding  - Provide teaching via preferred learning methods  Outcome: Adequate for Discharge

## 2019-12-20 NOTE — PROGRESS NOTES
Orthopedics   Marta Salud 61 y o  female MRN: [id number]  Unit/Bed#: -01      Subjective:  61 y  o female post operative day 9 right total hip arthroplasty  Pt doing well  Pain controlled      Labs:  0   Lab Value Date/Time    HCT 27 2 (L) 12/16/2019 0511    HCT 25 7 (L) 12/15/2019 0514    HCT 29 2 (L) 12/14/2019 1223    HGB 8 8 (L) 12/16/2019 0511    HGB 8 4 (L) 12/15/2019 0514    HGB 9 6 (L) 12/14/2019 1223    INR 0 99 12/04/2019 1004    WBC 4 81 12/16/2019 0511    WBC 4 86 12/15/2019 0514    WBC 5 78 12/14/2019 0441    CRP 4 8 (H) 12/04/2019 1004       Meds:    Current Facility-Administered Medications:     acetaminophen (TYLENOL) tablet 650 mg, 650 mg, Oral, Q6H PRN, Corinda Relic, PA-C, 650 mg at 12/15/19 0507    aluminum-magnesium hydroxide-simethicone (MYLANTA) 200-200-20 mg/5 mL oral suspension 30 mL, 30 mL, Oral, Q6H PRN, Corinda Relic, PA-C    calcium carbonate (TUMS) chewable tablet 1,000 mg, 1,000 mg, Oral, Daily PRN, Corinda Relic, PA-C, 1,000 mg at 12/18/19 1640    docusate sodium (COLACE) capsule 100 mg, 100 mg, Oral, BID, Corinda Relic, PA-C, 100 mg at 12/17/19 0801    enoxaparin (LOVENOX) subcutaneous injection 40 mg, 40 mg, Subcutaneous, Daily, Corinda Relic, PA-C, 40 mg at 12/19/19 2317    gabapentin (NEURONTIN) capsule 100 mg, 100 mg, Oral, Q8H, Corinda Relic, PA-C, 100 mg at 12/20/19 0555    lactated ringers infusion, 1 5 mL/kg/hr, Intravenous, Continuous, Corinda Relic, PA-C, Last Rate: 500 mL/hr at 12/13/19 2346, 6 887 mL/kg/hr at 12/13/19 2346    levothyroxine tablet 25 mcg, 25 mcg, Oral, Daily, Corinda Relic, PA-C, 25 mcg at 12/20/19 0555    LORazepam (ATIVAN) tablet 2 mg, 2 mg, Oral, Q8H PRN, Corinda Relic, PA-C, 2 mg at 12/20/19 0555    methocarbamol (ROBAXIN) tablet 750 mg, 750 mg, Oral, Q6H [address], Corinda Relic, PA-C, 750 mg at 12/20/19 0555    ondansetron (ZOFRAN) injection 4 mg, 4 mg, Intravenous, Q6H PRN, Corinda Relic, PA-C, 4 mg at 12/14/19 0803    oxyCODONE (ROXICODONE) immediate release tablet 10 mg, 10 mg, Oral, Q4H PRN, Silvia Valiente, PA-C, 10 mg at 12/19/19 0452    oxyCODONE (ROXICODONE) IR tablet 5 mg, 5 mg, Oral, Q4H PRN, Silvia Valiente, PA-C    pantoprazole (PROTONIX) EC tablet 40 mg, 40 mg, Oral, Daily, Fedesa Rocco, PA-C, 40 mg at 12/19/19 0805    PARoxetine (PAXIL) tablet 60 mg, 60 mg, Oral, QAM, Silvia Valiente, PA-C, 60 mg at 12/19/19 0805    QUEtiapine (SEROquel) tablet 400 mg, 400 mg, Oral, HS, Terressa Rocco, PA-C, 400 mg at 12/19/19 2317    saliva substitute (MOUTH KOTE) mucosal solution 5 spray, 5 spray, Mouth/Throat, 4x Daily PRN, Guerline Smith DO    ziprasidone (GEODON) capsule 80 mg, 80 mg, Oral, BID, Fedesa Rocco, PA-C, 80 mg at 12/19/19 1701    Blood Culture:   Lab Results   Component Value Date    BLOODCX No Growth After 5 Days  08/13/2018    BLOODCX Staphylococcus coagulase negative (A) 08/13/2018       Wound Culture:   No results found for: WOUNDCULT    Ins and Outs:  I/O last 24 hours: In: 3980 [P O :3980]  Out: -           Physical Exam:  Vitals:    12/19/19 2309   BP: 154/91   Pulse: 103   Resp: 19   Temp: 98 1 °F (36 7 °C)   SpO2: 98%     right lower extremity:  · Dressings C/D/I  · Sensation intact L2-S1  · Motor intact L2-S1  · 2+ dorsalis pedis   · Toes warm and well perfused  · Calves nontender    _*_*_*_*_*_*_*_*_*_*_*_*_*_*_*_*_*_*_*_*_*_*_*_*_*_*_*_*_*_*_*_*_*_*_*_*_*_*_*_*_*    Assessment: 61 y  o female post operative day 9 right total hip arthroplasty   Doing well    Plan:  · Weight Bearing as tolerated  · Up and out of bed  · Posterior total hip precautions  · Abduction pillow while in bed  · DVT prophylaxis  · Analgesics  · PT/OT  · DC pending med clearance    Jessee Benson PA-C

## 2019-12-20 NOTE — PLAN OF CARE
Problem: OCCUPATIONAL THERAPY ADULT  Goal: Performs self-care activities at highest level of function for planned discharge setting  See evaluation for individualized goals  Description  Treatment Interventions: ADL retraining, Functional transfer training, Endurance training, Patient/family training, Equipment evaluation/education, Compensatory technique education, Energy conservation, Activityengagement          See flowsheet documentation for full assessment, interventions and recommendations  Outcome: Progressing  Note:   Limitation: Decreased ADL status, Decreased endurance, Decreased self-care trans, Decreased high-level ADLs  Prognosis: Good  Assessment: Pt was seen this date for OT Tx session focusing on self care tasks, funciontal mobility, tranfers and overal lactivity toelrance  Pt has made signifit progress and imrpoved safety however reports feeling uncomforatble with QC and RW is not able to be used at home  PT resting in chair at end of session with all needs in reach   Continue to follow     OT Discharge Recommendation: Short Term Rehab(vs home)

## 2019-12-20 NOTE — PLAN OF CARE
Problem: PHYSICAL THERAPY ADULT  Goal: Performs mobility at highest level of function for planned discharge setting  See evaluation for individualized goals  Outcome: Progressing  Note:   Prognosis: Good  Problem List: Decreased strength, Decreased range of motion, Impaired balance, Decreased endurance, Decreased mobility, Decreased cognition, Pain, Orthopedic restrictions  Assessment: Pt continue to demonstrate improved mobility this session, utilizing NBQC during session  continues to require min A/close supervision due to impaired balance at this time  pt demonstrated improved posture, reduced guarding, and spontaneously progressed to reciprocal gait pattern during session  Demonstrated improved balance on steps while ambulating with NBQC, requiring minimal HHA for comfort  Pt also ambulated through obstacle couse to challenge ability to change directions, navigate around & over obstacles  Pt required instructions for safe approach to stepping over 1 & 2 inch obstacles, and was able to do so without LOB  Pt pace slowed & guarding increased through obstacle coarse, but no overt LOB noted  Will continue to benefit from practice with NBQC to allow for safe navigation of cluttered home environment  Continue with current POC and discharge recommendations at this time  Barriers to Discharge: Inaccessible home environment  Barriers to Discharge Comments: Due to social barriers, patient unable to fit RW in home, and will require use of different AD for functional mobility  Pt  reports prior to surgery using a folded RW to navigate or no AD at all  At this time, Pt  requires the use of quad cane for functional mobility  Recommendation: Post acute IP rehab     PT - OK to Discharge: Yes    See flowsheet documentation for full assessment

## 2019-12-20 NOTE — PROGRESS NOTES
Internal Medicine Progress Note  Patient: Ed Frost  Age/sex: 61 y o  female  Medical Record #: [id number]      ASSESSMENT/PLAN: (Interval History)  Ed Frost is seen and examined and management for following issues:    R ELIZABETH 12/11/19  · Pain control per Ortho  · Increased pain 12/18, Xrays negative  · Continue encourage incentive spirometry; monitor fever curve      Post operative blood loss anemia  · s/p 3U PRBCs in total post operatively  · Venofer given x 2   ·  hgb stable     Hypothyroid  · Cont supplementation     Schizophrenia disorder  · Cont current medications   · Mood stable     Dispo  · For rehab = pending    Patient stable for DC from medical standpoint  The above assessment and plan was reviewed and updated as determined by my evaluation of the patient on 12/20/2019      Labs:   Results from last 7 days   Lab Units 12/16/19  0511 12/15/19  0514   WBC Thousand/uL 4 81 4 86   HEMOGLOBIN g/dL 8 8* 8 4*   HEMATOCRIT % 27 2* 25 7*   PLATELETS Thousands/uL 224 175     Results from last 7 days   Lab Units 12/16/19  0511 12/15/19  0514   SODIUM mmol/L 137 134*   POTASSIUM mmol/L 3 9 4 0   CHLORIDE mmol/L 102 99*   CO2 mmol/L 32 33*   BUN mg/dL 6 4*   CREATININE mg/dL 0 56* 0 53*   CALCIUM mg/dL 8 9 8 5                   Review of Scheduled Meds:    Current Facility-Administered Medications:  acetaminophen 650 mg Oral Q6H PRN Cristy Crain PA-C    aluminum-magnesium hydroxide-simethicone 30 mL Oral Q6H PRN Cristy Crain PA-C    calcium carbonate 1,000 mg Oral Daily PRN Cristy Crain PA-C    docusate sodium 100 mg Oral BID Cristy Crain PA-C    enoxaparin 40 mg Subcutaneous Daily Cristy Crain PA-C    gabapentin 100 mg Oral Q8H Cristy Crain PA-C    lactated ringers 1 5 mL/kg/hr Intravenous Continuous Cristy Crain PA-C Last Rate: 6 887 mL/kg/hr (12/13/19 2346)   levothyroxine 25 mcg Oral Daily Cristy Crain PA-C    LORazepam 2 mg Oral Q8H PRN Cristy Crain PA-C    methocarbamol 750 mg Oral Q6H [address] Ethel Lipps, PA-C    ondansetron 4 mg Intravenous Q6H PRN Ethel Lipps, PA-C    oxyCODONE 10 mg Oral Q4H PRN Ethel Lipps, PA-C    oxyCODONE 5 mg Oral Q4H PRN Ethel Lipps, PA-C    pantoprazole 40 mg Oral Daily Ethel Lipps, PA-C    PARoxetine 60 mg Oral QAM Ethel Lipps, PA-C    QUEtiapine 400 mg Oral HS Ethel Lipps, PA-C    saliva substitute 5 spray Mouth/Throat 4x Daily PRN Noble Le, DO    ziprasidone 80 mg Oral BID Ethel Lipps, PA-C        Subjective/ HPI: Patient seen and examined  Patients overnight issues or events were reviewed with nursing or staff during rounds or morning huddle session  New or overnight issues include the following:     Pt did well overnight  ROS:   A 10 point ROS was performed; negative except as noted above  Imaging:     XR hip/pelv 2-3 vws right if performed   Final Result by Jackelyn Villegas MD (12/18 2277)      Unremarkable appearance of the right hip following total joint replacement                    Workstation performed: [id number]             *Labs /Radiology studiesReviewed  *Medications Reviewed and reconciled as needed  *Please refer to order section for additional ordered labs studies  *Case discussed with primary attending during morning huddle case rounds    Physical Examination:  Vitals:   Vitals:    12/19/19 0717 12/19/19 1544 12/19/19 2309 12/20/19 0809   BP: 126/82 138/88 154/91 154/92   Pulse: 104 83 103 94   Resp: 18 18 19 18   Temp: 98 3 °F (36 8 °C) 98 2 °F (36 8 °C) 98 1 °F (36 7 °C) (!) 97 2 °F (36 2 °C)   TempSrc:       SpO2: 99% 100% 98% 100%   Weight:       Height:           General Appearance: no distress, conversive  HEENT: PERRLA, conjuctiva normal; oropharynx clear; mucous membranes moist;   Neck:  Supple, no lymphadenopathy or thyromegaly  Lungs: CTA, normal respiratory effort, no retractions, expiratory effort normal  CV: regular rate and rhythm , PMI normal   ABD: soft non tender, no masses , no hepatic or splenomegaly  EXT: DP pulses intact, no lymphadenopathy, no edema  Skin: normal turgor, normal texture, no rash  Psych: affect normal, mood normal  Neuro: AAOx3      The above physical exam was reviewed and updated as determined by my evaluation of the patient on 12/20/2019  Invasive Devices     None                    VTE Pharmacologic Prophylaxis: Enoxaparin (Lovenox)  Code Status: Level 1 - Full Code  Current Length of Stay: 9 day(s)      Total time spent:  30 minutes with more than 50% spent counseling/coordinating care  Counseling includes discussion with patient re: progress  and discussion with patient of his/her current medical state/information  Coordination of patient's care was performed in conjunction with primary service  Time invested included review of patient's labs, vitals, and management of their comorbidities with continued monitoring  In addition, this patient was discussed with medical team including physician and advanced extenders  The care of the patient was extensively discussed and appropriate treatment plan was formulated unique for this patient  ** Please Note:  voice to text software may have been used in the creation of this document   Although proof errors in transcription or interpretation are a potential of such software**

## 2019-12-20 NOTE — OCCUPATIONAL THERAPY NOTE
Occupational Therapy Treatment Note:       12/20/19 1140   Restrictions/Precautions   Weight Bearing Precautions Per Order Yes   RLE Weight Bearing Per Order WBAT   Other Precautions Pain; Fall Risk   Pain Assessment   Pain Score 7   Pain Type Acute pain   Pain Location Hip   Pain Orientation Right   ADL   Where Assessed Standing at sink   Grooming Assistance 5  Supervision/Setup   Grooming Deficit Wash/dry hands; Wash/dry face; Teeth care   Grooming Comments RW level   UB Bathing Assistance 5  Supervision/Setup   UB Bathing Deficit Chest;Right arm;Left arm; Abdomen   UB Bathing Comments standing   LB Bathing Assistance 5  Supervision/Setup   LB Bathing Deficit Perineal area; Buttocks;Right upper leg;Left upper leg   LB Bathing Comments standing   UB Dressing Assistance 5  Supervision/Setup   UB Dressing Deficit Thread RUE; Thread LUE   UB Dressing Comments standing   LB Dressing Assistance 5  Supervision/Setup   LB Dressing Deficit Thread RLE into underwear; Thread LLE into underwear;Pull up over hips   LB Dressing Comments seated   Functional Standing Tolerance   Time ~10 mins   Activity standing at the sink   Comments ADL tasks   Bed Mobility   Additional Comments OOB upon presentatio nand at end of sesion   Transfers   Sit to Stand 5  Supervision   Stand to Sit 5  Supervision   Stand pivot 5  Supervision   Additional Comments QC level   Functional Mobility   Functional Mobility 5  Supervision   Additional Comments QC level   Cognition   Overall Cognitive Status WFL   Arousal/Participation Cooperative   Attention Attends with cues to redirect   Orientation Level Oriented X4   Memory Within functional limits   Following Commands Follows multistep commands with increased time or repetition   Comments immprove safety noted   Activity Tolerance   Activity Tolerance Patient tolerated treatment well   Medical Staff Made Aware NSG Aware   Assessment   Assessment Pt was seen this date for OT Tx session focusing on self care tasks, funciontal mobility, tranfers and overal lactivity toelrance  Pt has made signifit progress and imrpoved safety however reports feeling uncomforatble with QC and RW is not able to be used at home  PT resting in chair at end of session with all needs in reach   Continue to follow   Plan   Treatment Interventions ADL retraining   Goal Expiration Date 12/26/19   OT Treatment Day 4   OT Frequency 3-5x/wk   Recommendation   OT Discharge Recommendation Short Term Rehab  (vs home)     Yonis Ascencio [place]

## 2019-12-20 NOTE — PHYSICAL THERAPY NOTE
Physical Therapy Progress Note     12/20/19 1110   Pain Assessment   Pain Assessment 0-10   Pain Score 7   Hospital Pain Intervention(s) Repositioned; Ambulation/increased activity; Elevated; Rest   Response to Interventions tolerated   Restrictions/Precautions   RLE Weight Bearing Per Order WBAT   Other Precautions Pain; Fall Risk;WBS;THR   Subjective   Subjective pt encountered seated in recliner, pleasant and agreeable to treatment  Reports no new complaints  Stated she has not been using NBQC due to 1 instance of minor LOB with nursing since previous session, but felt more comfortable using it this session  Transfers   Sit to Stand 5  Supervision   Additional items Assist x 1; Armrests; Increased time required   Stand to Sit 5  Supervision   Additional items Assist x 1; Armrests; Increased time required   Stand pivot 5  Supervision   Additional items Assist x 1   Toilet transfer 5  Supervision   Additional items Assist x 1;Raised toilet seat;Armrests   Ambulation/Elevation   Gait pattern Excessively slow; Short stride;Decreased foot clearance;Decreased R stance; Improper Weight shift; Antalgic;Narrow MIGUEL; Forward Flexion   Gait Assistance 4  Minimal assist   Additional items Assist x 1   Assistive Device Small base quad cane   Distance 80', 15' x 2, 70'   Stair Management Assistance 4  Minimal assist   Additional items Assist x 1   Stair Management Technique No rails; Foreward; Step to pattern  (with NBQC)   Number of Stairs 2   Balance   Static Sitting Fair +   Static Standing Fair   Ambulatory Fair -   Endurance Deficit   Endurance Deficit Yes   Endurance Deficit Description pain, fatigue   Activity Tolerance   Activity Tolerance Patient tolerated treatment well   Nurse Made Aware yes   Assessment   Prognosis Good   Problem List Decreased strength;Decreased range of motion; Impaired balance;Decreased endurance;Decreased mobility; Decreased cognition;Pain;Orthopedic restrictions   Assessment Pt continue to demonstrate improved mobility this session, utilizing NBQC during session  continues to require min A/close supervision due to impaired balance at this time  pt demonstrated improved posture, reduced guarding, and spontaneously progressed to reciprocal gait pattern during session  Demonstrated improved balance on steps while ambulating with NBQC, requiring minimal HHA for comfort  Pt also ambulated through obstacle couse to challenge ability to change directions, navigate around & over obstacles  Pt required instructions for safe approach to stepping over 1 & 2 inch obstacles, and was able to do so without LOB  Pt pace slowed & guarding increased through obstacle coarse, but no overt LOB noted  Will continue to benefit from practice with NBQC to allow for safe navigation of cluttered home environment  Continue with current POC and discharge recommendations at this time  Barriers to Discharge Inaccessible home environment   Barriers to Discharge Comments Due to social barriers, patient unable to fit RW in home, and will require use of different AD for functional mobility  Pt  reports prior to surgery using a folded RW to navigate or no AD at all  At this time, Pt  requires the use of quad cane for functional mobility  Goals   Patient Goals to be able to go home soon   STG Expiration Date 12/22/19   PT Treatment Day 9   Plan   Treatment/Interventions Functional transfer training;LE strengthening/ROM; Elevations; Therapeutic exercise; Endurance training;Patient/family training;Equipment eval/education; Bed mobility;Gait training   Progress Progressing toward goals   PT Frequency 7x/wk   Recommendation   Recommendation Post acute IP rehab   Equipment Recommended Other (Comment)  (NBQC)     Vanessa Emmanuel, PTA

## 2019-12-20 NOTE — SOCIAL WORK
Pt medically stable for d/c  As per CM SC note yesterday pt set up with Hodan Felipe w/c Mayela Gamboa for 2 pm today to [facility]  CM informed pt who agrees with dcp  CM reviewed IMM  Pt signed  Original placed in pt chart and copy provided to pt

## 2019-12-20 NOTE — PROGRESS NOTES
Chart review reveals that patient did have successful R THR on 12/11/19  Patient is scheduled for D?C to [facility] this PM for STR by [employer]  Patient did have acute blood loss anemia immediately following surgery and received 3 u PRBC's  Will follow electronically D/C plan and STR

## 2019-12-21 ENCOUNTER — TELEPHONE (OUTPATIENT)
Dept: OTHER | Facility: OTHER | Age: 59
End: 2019-12-21

## 2019-12-21 DIAGNOSIS — Z96.641 STATUS POST RIGHT HIP REPLACEMENT: ICD-10-CM
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
## 2019-12-23 ENCOUNTER — TRANSITIONAL CARE MANAGEMENT (OUTPATIENT)
Dept: INTERNAL MEDICINE CLINIC | Facility: CLINIC | Age: 59
End: 2019-12-23

## 2019-12-23 ENCOUNTER — TELEPHONE (OUTPATIENT)
Dept: INTERNAL MEDICINE CLINIC | Facility: CLINIC | Age: 59
End: 2019-12-23

## 2019-12-23 ENCOUNTER — TELEPHONE (OUTPATIENT)
Dept: OBGYN CLINIC | Facility: HOSPITAL | Age: 59
End: 2019-12-23

## 2019-12-23 ENCOUNTER — TELEPHONE (OUTPATIENT)
Dept: OBGYN CLINIC | Facility: CLINIC | Age: 59
End: 2019-12-23

## 2019-12-23 DIAGNOSIS — M16.11 PRIMARY OSTEOARTHRITIS OF RIGHT HIP: ICD-10-CM

## 2019-12-23 DIAGNOSIS — Z96.641 STATUS POST TOTAL REPLACEMENT OF RIGHT HIP: Primary | ICD-10-CM

## 2019-12-23 PROCEDURE — TCMXX

## 2019-12-23 NOTE — UTILIZATION REVIEW
Notification of Discharge  This is a Notification of Discharge from our facility [address]  Please be advised that this patient has been discharge from our facility  Below you will find the admission and discharge date and time including the patients disposition  PRESENTATION DATE: 12/11/2019 11:22 AM  OBS ADMISSION DATE:   IP ADMISSION DATE: 12/11/19 2032   DISCHARGE DATE: 12/20/2019  2:04 PM  DISPOSITION: Non [facility] SN[facility]/SNU Non [facility] SNF/TCU/SNU   Admission Orders listed below:  Admission Orders (From admission, onward)     Ordered        12/11/19 2032  Inpatient Admission  Once                   Please contact the UR Department if additional information is required to close this patient's authorization/case  [address] [facility] Utilization Review Department  Main: [phone] x carefully listen to the prompts  All voicemails are confidential   [email or web address]  Send all requests for admission clinical reviews, approved or denied determinations and any other requests to dedicated fax number below belonging to the campus where the patient is receiving treatment   List of dedicated fax numbers:  [address] DENIALS (Administrative/Medical Necessity) [phone]   [address] (Maternity/NICU/Pediatrics) [phone]   Loida Vieira [phone]   [facility] [phone]   [address] [place] [address] [phone]   [address] [place] [address] [address] [place] [address] [phone]   [facility]  [phone]   [address] [facility]  [facility] [address] [facility] [phone]

## 2019-12-23 NOTE — TELEPHONE ENCOUNTER
Spoke with Antonietta Gomes- she has been updated that pt signed out AMA on Saturday from SNF  I received a verbal order for pt to receive home health physical therapy, [place] per Aubrey Handler  Referral placed and faxed with Apolonia Mota to [phone]  Spoke to [employer], at ph# [phone], who reports that they do accept pt's insurance and that they would likely be able to see pt for  PT in the home on Thursday or Friday 12/27  Pt aware [address] referral was placed through [facility] and pt was provided [facility] phone number for her to follow up if she does not receive a call from them to arrange [address]  Per pt, she and her mother WOULD allow therapy into their home  PA-C aware pt is folding up her walker to ambulate through the narrow paths in her home- she gave me a verbal order for a quad cane and is aware of the [facility] order  Both Quad cane and BSC referrals faxed to [facility] at [phone]  Provided [facility] phone number to the pt for her to follow up about delivery  [facility] ph- [phone]  Pt requesting a refill on gabapentin- requested from pt's surgical team     Pt made aware of her 12/26 follow up appt with Dr Kelly Abad at 1100  Pt reports she likely would not have transportation to get to this appt but would check with a neighbor if they could drive pt to that appt  Pt advised that Manuela would be calling tomorrow for an update about transport-- if neighbor unavailable to transport, Manuela to email Maria Elena Alves to set up [employer] transport  Pt denies having any further questions or needs at this time  I educated her on safety in her home and encouraged pt to ambulate slowly and with caution while in her home  Pt denies having any concerning symptoms to her at this time  She denies any falls/fevers/CP/SOB/dizziness/calf pain  Pt encouraged to call me with any questions, concerns or issues

## 2019-12-23 NOTE — TELEPHONE ENCOUNTER
Patient of Aruna De Los Santos:    Lien Carr called from [place] [address] called said patient was DC to short term rehab and she signed herself out  Now patient is calling in for her medication, Lien Carr is working on her pain medication and ortho needs but asked if we would call patient to follow up with symptoms and to see if additional medications/treatment are needed? Emma asked if you would call her with status once patient is reached  TCM was not started because documentation said patient went to rehab
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
## 2019-12-24 ENCOUNTER — TELEPHONE (OUTPATIENT)
Dept: OBGYN CLINIC | Facility: HOSPITAL | Age: 59
End: 2019-12-24

## 2019-12-24 VITALS
RESPIRATION RATE: 18 BRPM | OXYGEN SATURATION: 100 % | TEMPERATURE: 97.2 F | BODY MASS INDEX: 27.93 KG/M2 | DIASTOLIC BLOOD PRESSURE: 92 MMHG | HEIGHT: 64 IN | HEART RATE: 94 BPM | SYSTOLIC BLOOD PRESSURE: 154 MMHG | WEIGHT: 163.58 LBS

## 2019-12-24 DIAGNOSIS — Z96.641 STATUS POST RIGHT HIP REPLACEMENT: ICD-10-CM

## 2019-12-24 RX ORDER — GABAPENTIN 100 MG/1
100 CAPSULE ORAL EVERY 8 HOURS
Qty: 90 CAPSULE | Refills: 0 | Status: SHIPPED | OUTPATIENT
Start: 2019-12-24 | End: 2020-01-14 | Stop reason: HOSPADM

## 2019-12-24 NOTE — TELEPHONE ENCOUNTER
Fax would not go through at the fax number provided  Called the patient & she will call back with another fax number

## 2019-12-24 NOTE — TELEPHONE ENCOUNTER
Pt contacted to follow up regarding post operative appt for this [place], 12/26  Pt reports needing to discuss transportation with mother  Pt instructed to call me back by 10am today to allow me time to arrange due to holiday  Pt stated "I will try "    Pt educated if I don't hear she will need to get to the appt on her own

## 2019-12-24 NOTE — TELEPHONE ENCOUNTER
Patient is calling because she needs the order for the quad cane faxed to [phone]  I faxed the order      cb [phone]

## 2019-12-24 NOTE — TELEPHONE ENCOUNTER
Pt returned my phone call reporting she has a ride to her appt with surgeon on 12/26  Pt educated to also keep an eye on the phone calls as Lucy Lewis and Eleazar will be reaching out to her this week  Pt educated to contact me with any questions or issues

## 2019-12-26 ENCOUNTER — OFFICE VISIT (OUTPATIENT)
Dept: OBGYN CLINIC | Facility: HOSPITAL | Age: 59
End: 2019-12-26

## 2019-12-26 ENCOUNTER — TELEPHONE (OUTPATIENT)
Dept: OBGYN CLINIC | Facility: HOSPITAL | Age: 59
End: 2019-12-26

## 2019-12-26 ENCOUNTER — HOSPITAL ENCOUNTER (OUTPATIENT)
Dept: RADIOLOGY | Facility: HOSPITAL | Age: 59
Discharge: HOME | DRG: 466 | End: 2019-12-26
Attending: ORTHOPAEDIC SURGERY
Payer: COMMERCIAL

## 2019-12-26 VITALS
DIASTOLIC BLOOD PRESSURE: 90 MMHG | HEIGHT: 63 IN | SYSTOLIC BLOOD PRESSURE: 140 MMHG | BODY MASS INDEX: 28.88 KG/M2 | HEART RATE: 93 BPM | WEIGHT: 163 LBS

## 2019-12-26 DIAGNOSIS — Z96.641 STATUS POST RIGHT HIP REPLACEMENT: Primary | ICD-10-CM

## 2019-12-26 DIAGNOSIS — Z96.641 STATUS POST RIGHT HIP REPLACEMENT: ICD-10-CM

## 2019-12-26 PROCEDURE — 73502 X-RAY EXAM HIP UNI 2-3 VIEWS: CPT

## 2019-12-26 PROCEDURE — 99024 POSTOP FOLLOW-UP VISIT: CPT | Performed by: ORTHOPAEDIC SURGERY

## 2019-12-26 RX ORDER — GABAPENTIN 100 MG/1
100 CAPSULE ORAL EVERY 8 HOURS
Qty: 90 CAPSULE | Refills: 0
Start: 2019-12-26 | End: 2020-03-02 | Stop reason: SDUPTHER

## 2019-12-26 NOTE — TELEPHONE ENCOUNTER
Pt left me a VM on 12/24 reporting she did not receive her quad cane  I contacted [address] [place] [address] who reports patients insurance will not cover a quad cane as she was just issued a walker  I attempted to notify patient and was going to advise for patient to consider purchasing one  Pt did not answer, and VM was full  Unable to leave VM at this time

## 2019-12-26 NOTE — TELEPHONE ENCOUNTER
Caller: Benito Marina, [facility]  Call back number: [phone]  Patient's doctor: Dr My Gee    Patient is having a lot of pain  Just wants to let you know   Also, does the patient have any restrictions for PT

## 2019-12-27 ENCOUNTER — TELEPHONE (OUTPATIENT)
Dept: INTERNAL MEDICINE CLINIC | Facility: CLINIC | Age: 59
End: 2019-12-27

## 2019-12-27 ENCOUNTER — HOSPITAL ENCOUNTER (INPATIENT)
Facility: HOSPITAL | Age: 59
LOS: 16 days | Discharge: NURSING, REHAB OR LONG-TERM CARE | DRG: 466 | End: 2020-01-14
Attending: EMERGENCY MEDICINE | Admitting: INTERNAL MEDICINE
Payer: COMMERCIAL

## 2019-12-27 ENCOUNTER — TELEPHONE (OUTPATIENT)
Dept: OBGYN CLINIC | Facility: HOSPITAL | Age: 59
End: 2019-12-27

## 2019-12-27 DIAGNOSIS — F41.9 ANXIETY: ICD-10-CM

## 2019-12-27 DIAGNOSIS — F25.1 SCHIZOAFFECTIVE DISORDER, DEPRESSIVE TYPE (HCC): Chronic | ICD-10-CM

## 2019-12-27 DIAGNOSIS — F41.1 GAD (GENERALIZED ANXIETY DISORDER): Chronic | ICD-10-CM

## 2019-12-27 DIAGNOSIS — R33.9 URINARY RETENTION: ICD-10-CM

## 2019-12-27 DIAGNOSIS — M25.551 RIGHT HIP PAIN: Primary | ICD-10-CM

## 2019-12-27 DIAGNOSIS — Z96.641 STATUS POST RIGHT HIP REPLACEMENT: ICD-10-CM

## 2019-12-27 DIAGNOSIS — G21.0 NMS (NEUROLEPTIC MALIGNANT SYNDROME): ICD-10-CM

## 2019-12-27 DIAGNOSIS — S72.001A CLOSED RIGHT HIP FRACTURE (HCC): ICD-10-CM

## 2019-12-27 DIAGNOSIS — R26.2 AMBULATORY DYSFUNCTION: ICD-10-CM

## 2019-12-27 PROBLEM — R10.9 FLANK PAIN: Status: RESOLVED | Noted: 2018-10-30 | Resolved: 2019-12-27

## 2019-12-27 PROBLEM — R30.0 DYSURIA: Status: RESOLVED | Noted: 2018-11-28 | Resolved: 2019-12-27

## 2019-12-27 PROBLEM — Z01.818 PREOP EXAM FOR INTERNAL MEDICINE: Status: RESOLVED | Noted: 2019-11-18 | Resolved: 2019-12-27

## 2019-12-27 PROBLEM — J06.9 URI (UPPER RESPIRATORY INFECTION): Status: RESOLVED | Noted: 2018-05-25 | Resolved: 2019-12-27

## 2019-12-27 PROBLEM — Z87.81 S/P RIGHT HIP FRACTURE: Status: ACTIVE | Noted: 2019-12-27

## 2019-12-27 PROBLEM — J06.9 VIRAL UPPER RESPIRATORY TRACT INFECTION: Status: RESOLVED | Noted: 2019-04-29 | Resolved: 2019-12-27

## 2019-12-27 PROBLEM — D72.819 LEUKOPENIA: Status: RESOLVED | Noted: 2018-08-16 | Resolved: 2019-12-27

## 2019-12-27 PROBLEM — M80.80XD LOCALIZED OSTEOPOROSIS WITH CURRENT PATHOLOGICAL FRACTURE WITH ROUTINE HEALING: Status: ACTIVE | Noted: 2018-05-25

## 2019-12-27 LAB
ANION GAP SERPL CALCULATED.3IONS-SCNC: 5 MMOL/L (ref 4–13)
BASOPHILS # BLD AUTO: 0.04 THOUSANDS/ΜL (ref 0–0.1)
BASOPHILS NFR BLD AUTO: 1 % (ref 0–1)
BUN SERPL-MCNC: 4 MG/DL (ref 5–25)
CALCIUM SERPL-MCNC: 9.1 MG/DL (ref 8.3–10.1)
CHLORIDE SERPL-SCNC: 94 MMOL/L (ref 100–108)
CO2 SERPL-SCNC: 33 MMOL/L (ref 21–32)
CREAT SERPL-MCNC: 0.51 MG/DL (ref 0.6–1.3)
EOSINOPHIL # BLD AUTO: 0.02 THOUSAND/ΜL (ref 0–0.61)
EOSINOPHIL NFR BLD AUTO: 0 % (ref 0–6)
ERYTHROCYTE [DISTWIDTH] IN BLOOD BY AUTOMATED COUNT: 19.5 % (ref 11.6–15.1)
GFR SERPL CREATININE-BSD FRML MDRD: 106 ML/MIN/1.73SQ M
GLUCOSE SERPL-MCNC: 107 MG/DL (ref 65–140)
HCT VFR BLD AUTO: 34.4 % (ref 34.8–46.1)
HGB BLD-MCNC: 11.4 G/DL (ref 11.5–15.4)
IMM GRANULOCYTES # BLD AUTO: 0.03 THOUSAND/UL (ref 0–0.2)
IMM GRANULOCYTES NFR BLD AUTO: 1 % (ref 0–2)
LYMPHOCYTES # BLD AUTO: 0.61 THOUSANDS/ΜL (ref 0.6–4.47)
LYMPHOCYTES NFR BLD AUTO: 12 % (ref 14–44)
MCH RBC QN AUTO: 28.4 PG (ref 26.8–34.3)
MCHC RBC AUTO-ENTMCNC: 33.1 G/DL (ref 31.4–37.4)
MCV RBC AUTO: 86 FL (ref 82–98)
MONOCYTES # BLD AUTO: 0.6 THOUSAND/ΜL (ref 0.17–1.22)
MONOCYTES NFR BLD AUTO: 12 % (ref 4–12)
NEUTROPHILS # BLD AUTO: 3.92 THOUSANDS/ΜL (ref 1.85–7.62)
NEUTS SEG NFR BLD AUTO: 74 % (ref 43–75)
NRBC BLD AUTO-RTO: 0 /100 WBCS
PLATELET # BLD AUTO: 463 THOUSANDS/UL (ref 149–390)
PMV BLD AUTO: 7.9 FL (ref 8.9–12.7)
POTASSIUM SERPL-SCNC: 3.2 MMOL/L (ref 3.5–5.3)
RBC # BLD AUTO: 4.02 MILLION/UL (ref 3.81–5.12)
SODIUM SERPL-SCNC: 132 MMOL/L (ref 136–145)
WBC # BLD AUTO: 5.22 THOUSAND/UL (ref 4.31–10.16)

## 2019-12-27 PROCEDURE — 99220 PR INITIAL OBSERVATION CARE/DAY 70 MINUTES: CPT | Performed by: INTERNAL MEDICINE

## 2019-12-27 PROCEDURE — 85025 COMPLETE CBC W/AUTO DIFF WBC: CPT | Performed by: EMERGENCY MEDICINE

## 2019-12-27 PROCEDURE — 80048 BASIC METABOLIC PNL TOTAL CA: CPT | Performed by: EMERGENCY MEDICINE

## 2019-12-27 PROCEDURE — 99285 EMERGENCY DEPT VISIT HI MDM: CPT

## 2019-12-27 PROCEDURE — G8979 MOBILITY GOAL STATUS: HCPCS

## 2019-12-27 PROCEDURE — G8978 MOBILITY CURRENT STATUS: HCPCS

## 2019-12-27 PROCEDURE — 97163 PT EVAL HIGH COMPLEX 45 MIN: CPT

## 2019-12-27 PROCEDURE — G8987 SELF CARE CURRENT STATUS: HCPCS

## 2019-12-27 PROCEDURE — 97167 OT EVAL HIGH COMPLEX 60 MIN: CPT

## 2019-12-27 PROCEDURE — G8988 SELF CARE GOAL STATUS: HCPCS

## 2019-12-27 PROCEDURE — 99285 EMERGENCY DEPT VISIT HI MDM: CPT | Performed by: EMERGENCY MEDICINE

## 2019-12-27 PROCEDURE — 36415 COLL VENOUS BLD VENIPUNCTURE: CPT | Performed by: EMERGENCY MEDICINE

## 2019-12-27 RX ORDER — PAROXETINE HYDROCHLORIDE 20 MG/1
60 TABLET, FILM COATED ORAL EVERY MORNING
Status: DISCONTINUED | OUTPATIENT
Start: 2019-12-28 | End: 2020-01-03

## 2019-12-27 RX ORDER — OXYCODONE HCL 5 MG/5 ML
10 SOLUTION, ORAL ORAL EVERY 4 HOURS PRN
Status: DISCONTINUED | OUTPATIENT
Start: 2019-12-27 | End: 2020-01-04

## 2019-12-27 RX ORDER — QUETIAPINE FUMARATE 100 MG/1
400 TABLET, FILM COATED ORAL
Status: DISCONTINUED | OUTPATIENT
Start: 2019-12-27 | End: 2020-01-03

## 2019-12-27 RX ORDER — FERROUS SULFATE 325(65) MG
325 TABLET ORAL 2 TIMES DAILY WITH MEALS
Status: DISCONTINUED | OUTPATIENT
Start: 2019-12-28 | End: 2020-01-03

## 2019-12-27 RX ORDER — FOLIC ACID 1 MG/1
1 TABLET ORAL DAILY
Status: DISCONTINUED | OUTPATIENT
Start: 2019-12-28 | End: 2020-01-14 | Stop reason: HOSPADM

## 2019-12-27 RX ORDER — POTASSIUM CHLORIDE 20 MEQ/1
40 TABLET, EXTENDED RELEASE ORAL ONCE
Status: COMPLETED | OUTPATIENT
Start: 2019-12-27 | End: 2019-12-27

## 2019-12-27 RX ORDER — LORAZEPAM 1 MG/1
2 TABLET ORAL EVERY 8 HOURS PRN
Status: DISCONTINUED | OUTPATIENT
Start: 2019-12-27 | End: 2020-01-14 | Stop reason: HOSPADM

## 2019-12-27 RX ORDER — MELATONIN
2000 DAILY
Status: DISCONTINUED | OUTPATIENT
Start: 2019-12-28 | End: 2020-01-14 | Stop reason: HOSPADM

## 2019-12-27 RX ORDER — GABAPENTIN 100 MG/1
100 CAPSULE ORAL 3 TIMES DAILY
Status: DISCONTINUED | OUTPATIENT
Start: 2019-12-27 | End: 2020-01-14 | Stop reason: HOSPADM

## 2019-12-27 RX ORDER — ZIPRASIDONE HYDROCHLORIDE 40 MG/1
80 CAPSULE ORAL 2 TIMES DAILY
Status: DISCONTINUED | OUTPATIENT
Start: 2019-12-27 | End: 2020-01-03

## 2019-12-27 RX ORDER — CALCIUM CARBONATE 500(1250)
1 TABLET ORAL 2 TIMES DAILY WITH MEALS
Status: DISCONTINUED | OUTPATIENT
Start: 2019-12-28 | End: 2020-01-14 | Stop reason: HOSPADM

## 2019-12-27 RX ORDER — ASCORBIC ACID 500 MG
500 TABLET ORAL 2 TIMES DAILY
Status: DISCONTINUED | OUTPATIENT
Start: 2019-12-27 | End: 2020-01-03

## 2019-12-27 RX ORDER — ACETAMINOPHEN 325 MG/1
975 TABLET ORAL EVERY 8 HOURS SCHEDULED
Status: DISCONTINUED | OUTPATIENT
Start: 2019-12-27 | End: 2020-01-14 | Stop reason: HOSPADM

## 2019-12-27 RX ORDER — DOCUSATE SODIUM 100 MG/1
100 CAPSULE, LIQUID FILLED ORAL 2 TIMES DAILY
Status: DISCONTINUED | OUTPATIENT
Start: 2019-12-27 | End: 2020-01-08

## 2019-12-27 RX ORDER — PANTOPRAZOLE SODIUM 40 MG/1
40 TABLET, DELAYED RELEASE ORAL
Status: DISCONTINUED | OUTPATIENT
Start: 2019-12-28 | End: 2020-01-14 | Stop reason: HOSPADM

## 2019-12-27 RX ORDER — OXYCODONE HYDROCHLORIDE 5 MG/1
5 TABLET ORAL EVERY 4 HOURS PRN
Status: DISCONTINUED | OUTPATIENT
Start: 2019-12-27 | End: 2020-01-04

## 2019-12-27 RX ORDER — METHOCARBAMOL 500 MG/1
750 TABLET, FILM COATED ORAL EVERY 6 HOURS SCHEDULED
Status: DISCONTINUED | OUTPATIENT
Start: 2019-12-28 | End: 2020-01-14 | Stop reason: HOSPADM

## 2019-12-27 RX ORDER — LEVOTHYROXINE SODIUM 0.03 MG/1
25 TABLET ORAL
Status: DISCONTINUED | OUTPATIENT
Start: 2019-12-28 | End: 2020-01-07

## 2019-12-27 RX ADMIN — OXYCODONE HYDROCHLORIDE 10 MG: 5 SOLUTION ORAL at 22:03

## 2019-12-27 RX ADMIN — GABAPENTIN 100 MG: 100 CAPSULE ORAL at 23:11

## 2019-12-27 RX ADMIN — DOCUSATE SODIUM 100 MG: 100 CAPSULE, LIQUID FILLED ORAL at 23:11

## 2019-12-27 RX ADMIN — POTASSIUM CHLORIDE 40 MEQ: 1500 TABLET, EXTENDED RELEASE ORAL at 22:03

## 2019-12-27 RX ADMIN — QUETIAPINE FUMARATE 400 MG: 100 TABLET ORAL at 23:21

## 2019-12-27 RX ADMIN — ACETAMINOPHEN 975 MG: 325 TABLET ORAL at 23:11

## 2019-12-27 RX ADMIN — ZIPRASIDONE HCL 80 MG: 40 CAPSULE ORAL at 23:22

## 2019-12-27 RX ADMIN — METHOCARBAMOL 750 MG: 750 TABLET, FILM COATED ORAL at 23:12

## 2019-12-27 RX ADMIN — OXYCODONE HYDROCHLORIDE AND ACETAMINOPHEN 500 MG: 500 TABLET ORAL at 23:21
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
## 2019-12-27 NOTE — PHYSICAL THERAPY NOTE
Physical Therapy Evaluation     Patient's Name: Kinza Epstein    Admitting Diagnosis  Fall [W19  XXXA]    Problem List  Patient Active Problem List   Diagnosis    Hyponatremia    Lumbar radiculopathy    DDD (degenerative disc disease), lumbar    Spondylolisthesis at L4-L5 level    Localized osteoporosis without current pathological fracture    URI (upper respiratory infection)    Spinal stenosis of lumbar region with neurogenic claudication    Lumbar spondylosis    T12 compression fracture (HCC)    Synovial cyst of lumbar facet joint    Anxiety    Bulimia nervosa in remission    Constipation    Esophageal reflux    Hyperlipidemia    Acquired hypothyroidism    Other fatigue    Serotonin syndrome    Leukopenia    Schizoaffective disorder, depressive type (HCC)    ABIMAEL (generalized anxiety disorder)    Flank pain    Dermal hypersensitivity reaction    Dysuria    Viral upper respiratory tract infection    Elevated BP without diagnosis of hypertension    Essential hypertension    Right hip pain    Chronic bilateral low back pain without sciatica    Iron deficiency anemia secondary to inadequate dietary iron intake    Preop exam for internal medicine    Status post right hip replacement       Past Medical History  Past Medical History:   Diagnosis Date    Anxiety     Back pain at L4-L5 level     Schreiber esophagus     Bulimia     Disease of thyroid gland     hypothyroid    GERD (gastroesophageal reflux disease)     Hyperlipidemia     Hypothyroidism     Leukopenia     [facility]     Sciatica     Seizure ([address] )     due to medication mix up 8/2018 only one historically    Synovial cyst of lumbar spine     Vertigo     Weight gain        Past Surgical History  Past Surgical History:   Procedure Laterality Date    BONE MARROW BIOPSY      BREAST SURGERY      enlargement     COLONOSCOPY      [place] TOTAL HIP ARTHROPLASTY Right 12/11/2019 Procedure: ARTHROPLASTY HIP TOTAL ANTERIOR;  Surgeon: Jina Hooper MD;  [facility];  Service: Orthopedics    RHINOPLASTY          12/27/19 4203   Note Type   Note type Eval/Treat   Pain Assessment   Pain Assessment 0-10   Pain Score Worst Possible Pain   Pain Type Acute pain   Pain Location Hip   Pain Orientation Right   Pain Descriptors Aching   Pain Frequency Constant/continuous   Hospital Pain Intervention(s) Ambulation/increased activity;Repositioned   Response to Interventions tolerated   Home Living   Type of [address] level;Stairs to enter with rails  (3 FAMILIA)   Home Equipment Walker;Cane   Additional Comments Noted difficulty using RW in home 2* to space concerns   Prior Function   Level of [place] Independent with ADLs and functional mobility   Lives With Other (Comment)  (mother)   Receives Help From Family   ADL Assistance Independent   IADLs Independent   Falls in the last 6 months 1 to 4   Restrictions/Precautions   Weight Bearing Precautions Per Order Yes   RLE Weight Bearing Per Order WBAT  (per ortho)   Other Precautions WBS; Fall Risk;Pain;THR;Multiple lines   General   Family/Caregiver Present No   RLE Assessment   RLE Assessment   (grossly 3/5 with movement)   LLE Assessment   LLE Assessment WFL   Coordination   Movements are Fluid and Coordinated 0   Coordination and Movement Description slow and guarded with pain   Bed Mobility   Supine to Sit 4  Minimal assistance   Additional items Assist x 1   Sit to Supine 4  Minimal assistance   Additional items Assist x 2   Transfers   Sit to Stand 4  Minimal assistance   Additional items Assist x 1   Stand to Sit 4  Minimal assistance   Additional items Assist x 1   Ambulation/Elevation   Gait pattern Excessively slow; Shuffling;Decreased R stance; Antalgic; Improper Weight shift   Gait Assistance 3  Moderate assist   Additional items Assist x 1   Assistive Device Rolling walker   Distance 2'   Balance   Static Sitting Fair   Dynamic Sitting Fair -   Ambulatory Poor   Endurance Deficit   Endurance Deficit Yes   Endurance Deficit Description limited by pain   Activity Tolerance   Activity Tolerance Patient limited by fatigue;Patient limited by pain   Medical Staff Made Aware SPoke to OT and CM for D/C planning   Assessment   Prognosis Good   Problem List Decreased strength;Decreased range of motion;Decreased endurance;Decreased mobility; Impaired balance;Decreased coordination;Decreased safety awareness;Pain;Orthopedic restrictions   Assessment Pt is 61 y o  female seen for PT evaluation s/p admit to [facility] on 12/27/2019 w/ progressive decline in functional mobility s/p anterior ELIZABETH 12/11, recent fall with mild periprosthetic fx  PT consulted to assess pt's functional mobility and d/c needs  Order placed for PT eval and tx, w/ WBAT R LE per ortho order  Comorbidities affecting pt's physical performance at time of assessment include: recent ELIZABETH, pain  PTA, pt was ambulates household distances and has 3 FAMILIA  Personal factors affecting pt at time of IE include: ambulating w/ assistive device, stairs to enter home, limited home support, positive fall history, unable to perform physical activity, limited insight into impairments, inability to perform IADLs and inability to perform ADLs  Please find objective findings from PT assessment regarding body systems outlined above with impairments and limitations including weakness, decreased ROM, impaired balance, decreased endurance, impaired coordination, gait deviations, pain, decreased activity tolerance, decreased functional mobility tolerance and fall risk  Pt required increased time and LE A to complete bed mobility  Noted good UE strength during bed mobility to scoot EOB  Educated in transfer technique  Single step instruction for ambulation  Poor ambulation tolerance with severe pain despite utilizing UE to decrease WB   Anticipate pt will require STR with limited home support and home environment which permits limited RW use  The following objective measures performed on IE also reveal limitations: Barthel Index: 50/100  Pt's clinical presentation is currently unstable/unpredictable seen in pt's presentation of pain  Pt to benefit from continued PT tx to address deficits as defined above and maximize level of functional independent mobility and consistency  From PT/mobility standpoint, recommendation at time of d/c would be IP rehab pending progress in order to facilitate return to PLOF  Barriers to Discharge Decreased caregiver support; Inaccessible home environment   Goals   Patient Goals To decrease pain, get stronger and return home   STG Expiration Date 01/08/20   Short Term Goal #1 1  Complete bed mobility and transfers I to decrease need for caregiver in home  2  Ambulate 300' I to complete household and community mobility without A  3  Improve dynamic balance to good to decrease need for UE support during ambulation  4  Be educated & demonstate 3 steps to be able to enter home without A  Plan   Treatment/Interventions OT; Spoke to nursing;Gait training;Bed mobility; Patient/family training; Endurance training;LE strengthening/ROM; Functional transfer training   PT Frequency   (3-5x/wk)   Recommendation   Recommendation Post acute IP rehab   PT - OK to Discharge Yes  (to rehab when medically stable )   Barthel Index   Feeding 10   Bathing 0   Grooming Score 5   Dressing Score 5   Bladder Score 10   Bowels Score 10   Toilet Use Score 5   Transfers (Bed/Chair) Score 5   Mobility (Level Surface) Score 0   Stairs Score 0   Barthel Index Score 50           Vickie Parsons, PT

## 2019-12-27 NOTE — TELEPHONE ENCOUNTER
Patient called back returning phone call  I relayed message above and offered 2 appt times with anamika today but she declined  She stated she can not walk   Please advise, thank you         Cb#: [phone]

## 2019-12-27 NOTE — PROGRESS NOTES
61 y o  female presenting to the office for 2 week follow up status post right anterior total hip arthroplasty (dos: 12/11/19)  Patient was discharged from the hospital left Friday  She left AMA from [facility] on Saturday because she states the place was [employer] they weren't giving her her medications  Patient states that when she went home, she had a fall while standing at the sink to wash her hair  She has also been ambulating around the house while having to the full do a walker because there is no space in her hallways to have the walker completely expanded  Patient states that after the fall occurred, she called 911 to have the please help her stand back up, but did not seek any treatment for her hip  She states that she is having 10/10 pain in the hip  She denies any other acute complaints    ROS  Review of Systems - otherwise negative    Past Medical History:   Diagnosis Date    Anxiety     Back pain at L4-L5 level     Schreiber esophagus     Bulimia     Disease of thyroid gland     hypothyroid    GERD (gastroesophageal reflux disease)     Hyperlipidemia     Hypothyroidism     Leukopenia     Rheumatoid arthritis involving right hip (HCC)     Sciatica     Seizure ([address] )     due to medication mix up 8/2018 only one historically    Synovial cyst of lumbar spine     Vertigo     Weight gain      Past Surgical History:   Procedure Laterality Date    BONE MARROW BIOPSY      BREAST SURGERY      enlargement     COLONOSCOPY      [place] TOTAL HIP ARTHROPLASTY Right 12/11/2019    Procedure: ARTHROPLASTY HIP TOTAL ANTERIOR;  Surgeon: Jina Hooper MD;  [facility];  Service: Orthopedics    RHINOPLASTY       Results Reviewed     None          Physical Exam  Physical Exam   Constitutional: She is oriented to person, place, and time  She appears well-developed and well-nourished  HENT:   Head: Normocephalic and atraumatic  Eyes: Pupils are equal, round, and reactive to light   EOM are normal    Neck: Neck supple  No tracheal deviation present  Cardiovascular: Normal rate and regular rhythm  Pulmonary/Chest: Effort normal and breath sounds normal    Abdominal: There is no guarding  Neurological: She is alert and oriented to person, place, and time  Skin: Skin is warm and dry  Psychiatric: She has a normal mood and affect  Her behavior is normal      Right Hip Exam     Tenderness   The patient is experiencing tenderness in the anterior  Range of Motion   Flexion: 110   External rotation: 30   Internal rotation: 20     Muscle Strength   Flexion: 5/5     Other   Erythema: absent  Sensation: normal        Imaging  I personally reviewed these images  :  Patient has a periprosthetic femur fracture in the right hip  Hip prosthetic in good position without appearance of displacement despite fracture  Assessment/Plan  61 y o  female  2 week follow up status post right anterior total hip arthroplasty   · Continue with activities as tolerated  · Reviewed with patient that activities will hurt because she has a fracture, however, this fracture does not require any operative intervention, nor should she allow it to limit her rehab  · Continue with physical therapy activities  · Patient is getting home PT, they will be out later today    · Complete DVT ppx (4 weeks post-op)  · Hold off on dental appointments for 6 weeks post-op  · Take antibiotics prior to any dental appointments  · This is a life-long requirement  · Follow up in 4 weeks for evaluation with x-ray

## 2019-12-27 NOTE — ED PROVIDER NOTES
History  Chief Complaint   Patient presents with    Hip Pain     Patient reports she had right hip surgery on 12/11, fell 6 days ago and having pain in right hip  Reports she saw ortho yesterday  60-year-old female status post total hip arthroplasty of the right on December 11 2019 who was discharged on December 20 2019 to an inpatient rehab facility presents for evaluation of right leg pain  Patient left AMA from her rehab facility[facility] this past Saturday because the patient fell to the placed was unclean and she did not get meds  Patient then went home and had mechanical fall in the bathroom causing a right femur fracture  Patient was evaluated at her orthopedic clinics office on December 24 2019 more this was appreciated on plain films  Patient was told to be weight-bearing as tolerated  Patient called her orthopedic office today with continued pain and stated she wanted to go to the hospital for admission for inpatient treatment of her pain and recent fracture  Patient was told by Orthopedics that she does not need inpatient evaluation and should re-presented back to [facility] for continued therapy  Patient states she must have a admission for evaluation and placement into rehab and states she is willing to go to rehab  Patient states she is unable to take care of herself at home and is able to ambulate due to the pain  Patient states her pain is a 10/10 localized to the right femur without radiation  Patient states that nothing improves this pain and applying pressure and palpation over the femur exacerbates the pain  Patient states that narcotics are only medication that works for her pain and she has not tried Tylenol and is unable to tolerate NSAIDs      Patient told that a with placing a case management referral for expedited placement to a rehab facility    Patient is not happy with this and continues to request admission to the hospital             Prior to Admission Medications   Prescriptions Last Dose Informant Patient Reported? Taking? LORazepam (ATIVAN) 2 mg tablet  Self No No   Sig: TAKE 1 TABLET (2 MG TOTAL) BY MOUTH EVERY 8 (EIGHT) HOURS AS NEEDED FOR ANXIETY   Multiple Vitamin (MULTIVITAMIN) capsule  Self No No   Sig: Take 1 capsule by mouth daily   PARoxetine (PAXIL) 30 mg tablet  Self No No   Sig: Take 2 tablets (60 mg total) by mouth every morning   QUEtiapine (SEROquel) 400 MG tablet  Self No No   Sig: TAKE 1 TABLET (400 MG TOTAL) BY MOUTH DAILY AT BEDTIME   acetaminophen (TYLENOL) 325 mg tablet  Self No No   Sig: Take 2 tablets (650 mg total) by mouth every 6 (six) hours as needed for mild pain or fever   ascorbic acid (VITAMIN C) 500 mg tablet  Self No No   Sig: Take 1 tablet (500 mg total) by mouth 2 (two) times a day   docusate sodium (COLACE) 100 mg capsule  Self No No   Sig: Take 1 capsule (100 mg total) by mouth 2 (two) times a day   enoxaparin (LOVENOX) 40 mg/0 4 mL   No No   Sig: Inject 0 4 mL (40 mg total) under the skin daily Start after surgery   ferrous sulfate 324 (65 Fe) mg  Self No No   Sig: Take 1 tablet (324 mg total) by mouth 2 (two) times a day before meals   ferrous sulfate 325 (65 Fe) mg tablet  Self No No   Sig: TAKE 1 TABLET (325 MG TOTAL) BY MOUTH 2 (TWO) TIMES A DAY FOR 90 DAYS   folic acid (FOLVITE) 1 mg tablet  Self No No   Sig: Take 1 tablet (1 mg total) by mouth daily   gabapentin (NEURONTIN) 100 mg capsule  Self No No   Sig: Take 1 capsule (100 mg total) by mouth every 8 (eight) hours   gabapentin (NEURONTIN) 100 mg capsule  Self No No   Sig: Take 1 capsule (100 mg total) by mouth every 8 (eight) hours   levothyroxine 25 mcg tablet  Self Yes No   Sig: Take 25 mcg by mouth daily     methocarbamol (ROBAXIN) 750 mg tablet  Self No No   Sig: Take 1 tablet (750 mg total) by mouth every 6 (six) hours   omeprazole (PriLOSEC) 40 MG capsule  Self Yes No   Sig: Take 80 mg by mouth 2 (two) times a day     oxyCODONE (ROXICODONE) 5 mg immediate release tablet  Self No No   Sig: Take 1 tablet (5 mg total) by mouth every 4 (four) hours as needed for moderate pain for up to 10 daysMax Daily Amount: 30 mg   oxyCODONE (ROXICODONE) 5 mg immediate release tablet  Self No No   Sig: Take 1 tablet (5 mg total) by mouth every 6 (six) hours as needed for moderate pain for up to 10 daysMax Daily Amount: 20 mg   triamcinolone (KENALOG) 0 1 % cream  Self No No   Sig: APPLY TO AFFECTED AREA TWICE A DAY   ziprasidone (GEODON) 80 mg capsule  Self No No   Sig: Take 1 capsule (80 mg total) by mouth 2 (two) times a day      Facility-Administered Medications: None       Past Medical History:   Diagnosis Date    Anxiety     Back pain at L4-L5 level     Schreiber esophagus     Bulimia     Disease of thyroid gland     hypothyroid    GERD (gastroesophageal reflux disease)     Hyperlipidemia     Hypothyroidism     Leukopenia     Rheumatoid arthritis involving right hip (HCC)     Sciatica     Seizure (HCC)     due to medication mix up 8/2018 only one historically    Synovial cyst of lumbar spine     Vertigo     Weight gain        Past Surgical History:   Procedure Laterality Date    BONE MARROW BIOPSY      BREAST SURGERY      enlargement     COLONOSCOPY      TN TOTAL HIP ARTHROPLASTY Right 12/11/2019    Procedure: ARTHROPLASTY HIP TOTAL ANTERIOR;  Surgeon: Camille Gibbons MD;  Location: [facility];  Service: Orthopedics    RHINOPLASTY         Family History   Problem Relation Age of Onset    Uterine cancer Mother     Esophageal cancer Father     Colon cancer Maternal Grandmother      I have reviewed and agree with the history as documented  Social History     Tobacco Use    Smoking status: Never Smoker    Smokeless tobacco: Never Used   Substance Use Topics    Alcohol use: Never     Frequency: Never     Binge frequency: Never    Drug use: No        Review of Systems   Constitutional: Negative for chills, diaphoresis, fatigue and fever     HENT: Negative for congestion, ear discharge, facial swelling, hearing loss, rhinorrhea, sinus pressure, sinus pain, sneezing, sore throat, tinnitus and trouble swallowing  Eyes: Negative for pain, discharge and redness  Respiratory: Negative for cough, choking, chest tightness, shortness of breath, wheezing and stridor  Cardiovascular: Negative for chest pain, palpitations and leg swelling  Gastrointestinal: Negative for abdominal distention, abdominal pain, blood in stool, constipation, diarrhea, nausea and vomiting  Endocrine: Negative for cold intolerance, polydipsia and polyuria  Genitourinary: Negative for difficulty urinating, dysuria, enuresis, flank pain, frequency and hematuria  Musculoskeletal: Positive for arthralgias  Negative for back pain, gait problem and neck stiffness  Skin: Negative for rash and wound  Neurological: Negative for dizziness, seizures, syncope, weakness, numbness and headaches  Hematological: Negative for adenopathy  Psychiatric/Behavioral: Negative for agitation, confusion, hallucinations, sleep disturbance and suicidal ideas  All other systems reviewed and are negative  Physical Exam  ED Triage Vitals [12/27/19 1100]   Temperature Pulse Respirations Blood Pressure SpO2   98 3 °F (36 8 °C) 92 18 (!) 178/102 97 %      Temp Source Heart Rate Source Patient Position - Orthostatic VS BP Location FiO2 (%)   Oral Monitor Sitting Right arm --      Pain Score       Worst Possible Pain             Orthostatic Vital Signs  Vitals:    12/27/19 2030 12/27/19 2215 12/27/19 2251 12/28/19 0706   BP: 154/83 96/84 157/91 (!) 133/108   Pulse: 92 84 98 (!) 122   Patient Position - Orthostatic VS: Lying Lying         Physical Exam   Constitutional: She is oriented to person, place, and time  She appears well-developed and well-nourished  No distress  HENT:   Head: Normocephalic and atraumatic  Right Ear: External ear normal    Left Ear: External ear normal    Nose: No sinus tenderness  No epistaxis  Mouth/Throat: No oropharyngeal exudate  Eyes: Pupils are equal, round, and reactive to light  Conjunctivae and EOM are normal  Right eye exhibits no discharge  Left eye exhibits no discharge  Neck: No JVD present  Cardiovascular: Normal rate, regular rhythm, normal heart sounds and intact distal pulses  Exam reveals no gallop and no friction rub  No murmur heard  Pulmonary/Chest: Effort normal and breath sounds normal  No stridor  No respiratory distress  She has no wheezes  She has no rales  Abdominal: Soft  Bowel sounds are normal  She exhibits no distension and no mass  There is no tenderness  There is no rebound and no guarding  Musculoskeletal: Normal range of motion  She exhibits no edema, tenderness or deformity  Legs:  Lymphadenopathy:     She has no cervical adenopathy  Neurological: She is alert and oriented to person, place, and time  She has normal strength  No cranial nerve deficit or sensory deficit  GCS eye subscore is 4  GCS verbal subscore is 5  GCS motor subscore is 6  Reflex Scores:       Patellar reflexes are 2+ on the right side and 2+ on the left side  UE and LE 5/5 strength, No focal neuro deficits  Skin: Skin is warm, dry and intact  Capillary refill takes less than 2 seconds  She is not diaphoretic  Psychiatric: She has a normal mood and affect  Her speech is normal and behavior is normal  Judgment and thought content normal    Nursing note and vitals reviewed        ED Medications  Medications   ascorbic acid (VITAMIN C) tablet 500 mg (500 mg Oral Given 12/28/19 0828)   docusate sodium (COLACE) capsule 100 mg (100 mg Oral Given 12/28/19 0826)   ferrous sulfate tablet 325 mg (325 mg Oral Given 97/99/48 6060)   folic acid (FOLVITE) tablet 1 mg (1 mg Oral Given 12/28/19 0823)   gabapentin (NEURONTIN) capsule 100 mg (100 mg Oral Given 12/28/19 0823)   levothyroxine tablet 25 mcg (25 mcg Oral Given 12/28/19 0503)   LORazepam (ATIVAN) tablet 2 mg (2 mg Oral Given 12/28/19 0823)   methocarbamol (ROBAXIN) tablet 750 mg (750 mg Oral Given 12/28/19 0504)   multivitamin-minerals (CENTRUM) tablet 1 tablet (1 tablet Oral Given 12/28/19 0828)   pantoprazole (PROTONIX) EC tablet 40 mg (40 mg Oral Given 12/28/19 0504)   oxyCODONE (ROXICODONE) IR tablet 5 mg (has no administration in time range)   PARoxetine (PAXIL) tablet 60 mg (60 mg Oral Given 12/28/19 0826)   QUEtiapine (SEROquel) tablet 400 mg (400 mg Oral Given 12/27/19 2321)   ziprasidone (GEODON) capsule 80 mg (80 mg Oral Given 12/28/19 0827)   acetaminophen (TYLENOL) tablet 975 mg (975 mg Oral Given 12/28/19 0503)   oxyCODONE (ROXICODONE) oral solution 10 mg (10 mg Oral Given 12/28/19 0735)   enoxaparin (LOVENOX) subcutaneous injection 40 mg (40 mg Subcutaneous Given 12/28/19 0829)   calcium carbonate (OYSTER SHELL,OSCAL) 500 mg tablet 1 tablet (1 tablet Oral Given 12/28/19 0825)   cholecalciferol (VITAMIN D3) tablet 2,000 Units (2,000 Units Oral Given 12/28/19 0823)   potassium chloride (K-DUR,KLOR-CON) CR tablet 40 mEq (40 mEq Oral Given 12/27/19 2203)       Diagnostic Studies  Results Reviewed     Procedure Component Value Units Date/Time    Vitamin D 25 hydroxy [[id number]]  (Abnormal) Collected:  12/28/19 0435    Lab Status:  Final result Specimen:  Blood from Hand, Right Updated:  12/28/19 0834     Vit D, 25-Hydroxy 21 6 ng/mL     Narrative: This assay is a certified procedure of the CDC Vitamin D Standardization Certification Program (VDSCP)     Deficiency <20ng/ml   Insufficiency 20-30ng/ml   Sufficient  ng/ml     *Patients undergoing fluorescein dye angiography may retain small amounts of fluorescein in the body for 48-72 hours post procedure  Samples containing fluorescein can produce falsely elevated Vitamin D values  If the patient had this procedure, a specimen should be resubmitted post fluorescein clearance        Basic metabolic panel [[id number]]  (Abnormal) Collected:  12/28/19 1555    Lab Status:  Final result Specimen:  Blood from Hand, Right Updated:  12/28/19 0503     Sodium 132 mmol/L      Potassium 3 5 mmol/L      Chloride 97 mmol/L      CO2 32 mmol/L      ANION GAP 3 mmol/L      BUN 4 mg/dL      Creatinine 0 47 mg/dL      Glucose 93 mg/dL      Calcium 9 5 mg/dL      eGFR 108 ml/min/1 73sq m     Narrative:       [place] guidelines for Chronic Kidney Disease (CKD):     Stage 1 with normal or high GFR (GFR > 90 mL/min/1 73 square meters)    Stage 2 Mild CKD (GFR = 60-89 mL/min/1 73 square meters)    Stage 3A Moderate CKD (GFR = 45-59 mL/min/1 73 square meters)    Stage 3B Moderate CKD (GFR = 30-44 mL/min/1 73 square meters)    Stage 4 Severe CKD (GFR = 15-29 mL/min/1 73 square meters)    Stage 5 End Stage CKD (GFR <15 mL/min/1 73 square meters)  Note: GFR calculation is accurate only with a steady state creatinine    Platelet count [[id number]]  (Abnormal) Collected:  12/28/19 0435    Lab Status:  Final result Specimen:  Blood from Hand, Right Updated:  12/28/19 0500     Platelets 869 Thousands/uL      MPV 8 0 fL     Basic metabolic panel [[id number]]  (Abnormal) Collected:  12/27/19 2103    Lab Status:  Final result Specimen:  Blood from Arm, Left Updated:  12/27/19 2136     Sodium 132 mmol/L      Potassium 3 2 mmol/L      Chloride 94 mmol/L      CO2 33 mmol/L      ANION GAP 5 mmol/L      BUN 4 mg/dL      Creatinine 0 51 mg/dL      Glucose 107 mg/dL      Calcium 9 1 mg/dL      eGFR 106 ml/min/1 73sq m     Narrative:       [place] guidelines for Chronic Kidney Disease (CKD):     Stage 1 with normal or high GFR (GFR > 90 mL/min/1 73 square meters)    Stage 2 Mild CKD (GFR = 60-89 mL/min/1 73 square meters)    Stage 3A Moderate CKD (GFR = 45-59 mL/min/1 73 square meters)    Stage 3B Moderate CKD (GFR = 30-44 mL/min/1 73 square meters)    Stage 4 Severe CKD (GFR = 15-29 mL/min/1 73 square meters)    Stage 5 End Stage CKD (GFR <15 mL/min/1 73 square meters)  Note: GFR calculation is accurate only with a steady state creatinine    CBC and differential [[id number]]  (Abnormal) Collected:  12/27/19 2103    Lab Status:  Final result Specimen:  Blood from Arm, Left Updated:  12/27/19 2120     WBC 5 22 Thousand/uL      RBC 4 02 Million/uL      Hemoglobin 11 4 g/dL      Hematocrit 34 4 %      MCV 86 fL      MCH 28 4 pg      MCHC 33 1 g/dL      RDW 19 5 %      MPV 7 9 fL      Platelets 299 Thousands/uL      nRBC 0 /100 WBCs      Neutrophils Relative 74 %      Immat GRANS % 1 %      Lymphocytes Relative 12 %      Monocytes Relative 12 %      Eosinophils Relative 0 %      Basophils Relative 1 %      Neutrophils Absolute 3 92 Thousands/µL      Immature Grans Absolute 0 03 Thousand/uL      Lymphocytes Absolute 0 61 Thousands/µL      Monocytes Absolute 0 60 Thousand/µL      Eosinophils Absolute 0 02 Thousand/µL      Basophils Absolute 0 04 Thousands/µL                  No orders to display         Procedures  Procedures      ED Course  ED Course as of Dec 28 0901   Fri Dec 27, 2019   1500 Anil Hickey PA- C     [facility] for [facility]hab went AMA fell fracture femur, WB AT  Needs to go to rehab  No surgical intervention  Refused to come into the office with Ortho  1317 Case management return call will evaluate emergency department      1438 Page PT who will attempt to find staff to eval patient        1821 No accepting facility, case management will discus with patient for other recs                                  MDM  Number of Diagnoses or Management Options  Ambulatory dysfunction: new and requires workup  Closed right hip fracture [facility]): new and requires workup  Right hip pain: new and requires workup  Diagnosis management comments: 1  Right femur fracture, known to Orthopedics  -per Orthopedics patient is to be weight-bearing as tolerated and is cleared for rehab facility    Per Orthopedics patient is non operative  -Tylenol for pain  - consult for placement  -orthopedics follow-up as scheduled by Orthopedics   -ED eval by PT/OT  -Admit to medicine        Disposition  Final diagnoses:   Right hip pain   Ambulatory dysfunction     Time reflects when diagnosis was documented in both MDM as applicable and the Disposition within this note     Time User Action Codes Description Comment    12/27/2019  9:44 PM Suraj Biswas Add [M25 551] Right hip pain     12/27/2019  9:44 PM Luciana Bijankarely Martinez Add [R26 2] Ambulatory dysfunction       ED Disposition     ED Disposition Condition Date/Time Comment    Admit Stable Fri Dec 27, 2019  9:44 PM Case was discussed with CHERYL and the patient's admission status was agreed to be Admission Status: observation status to the service of Dr Radha Valdes          Follow-up Information    None         Current Discharge Medication List      CONTINUE these medications which have NOT CHANGED    Details   acetaminophen (TYLENOL) 325 mg tablet Take 2 tablets (650 mg total) by mouth every 6 (six) hours as needed for mild pain or fever  Qty: 30 tablet, Refills: 0    Associated Diagnoses: Status post right hip replacement      ascorbic acid (VITAMIN C) 500 mg tablet Take 1 tablet (500 mg total) by mouth 2 (two) times a day  Qty: 60 tablet, Refills: 0    Associated Diagnoses: Primary osteoarthritis of right hip; Pre-operative laboratory examination      docusate sodium (COLACE) 100 mg capsule Take 1 capsule (100 mg total) by mouth 2 (two) times a day  Qty: 10 capsule, Refills: 0    Associated Diagnoses: Status post right hip replacement      enoxaparin (LOVENOX) 40 mg/0 4 mL Inject 0 4 mL (40 mg total) under the skin daily Start after surgery  Qty: 30 Syringe, Refills: 0    Associated Diagnoses: Primary osteoarthritis of right hip; Pre-operative laboratory examination      ferrous sulfate 324 (65 Fe) mg Take 1 tablet (324 mg total) by mouth 2 (two) times a day before meals  Qty: 60 tablet, Refills: 0    Associated Diagnoses: Primary osteoarthritis of right hip; Pre-operative laboratory examination      ferrous sulfate 325 (65 Fe) mg tablet TAKE 1 TABLET (325 MG TOTAL) BY MOUTH 2 (TWO) TIMES A DAY FOR 90 DAYS  Qty: 180 tablet, Refills: 0    Associated Diagnoses: Iron deficiency anemia, unspecified iron deficiency anemia type      folic acid (FOLVITE) 1 mg tablet Take 1 tablet (1 mg total) by mouth daily  Qty: 30 tablet, Refills: 0    Associated Diagnoses: Primary osteoarthritis of right hip; Pre-operative laboratory examination      !! gabapentin (NEURONTIN) 100 mg capsule Take 1 capsule (100 mg total) by mouth every 8 (eight) hours  Qty: 90 capsule, Refills: 0    Associated Diagnoses: Status post right hip replacement      !! gabapentin (NEURONTIN) 100 mg capsule Take 1 capsule (100 mg total) by mouth every 8 (eight) hours  Qty: 90 capsule, Refills: 0    Associated Diagnoses: Status post right hip replacement      levothyroxine 25 mcg tablet Take 25 mcg by mouth daily        LORazepam (ATIVAN) 2 mg tablet TAKE 1 TABLET (2 MG TOTAL) BY MOUTH EVERY 8 (EIGHT) HOURS AS NEEDED FOR ANXIETY  Qty: 90 tablet, Refills: 1    Comments: Not to exceed 4 additional fills before 03/03/2020  Associated Diagnoses: Anxiety      methocarbamol (ROBAXIN) 750 mg tablet Take 1 tablet (750 mg total) by mouth every 6 (six) hours  Qty: 30 tablet, Refills: 0    Associated Diagnoses: Status post right hip replacement      Multiple Vitamin (MULTIVITAMIN) capsule Take 1 capsule by mouth daily  Qty: 30 capsule, Refills: 0    Associated Diagnoses: Primary osteoarthritis of right hip; Pre-operative laboratory examination      omeprazole (PriLOSEC) 40 MG capsule Take 80 mg by mouth 2 (two) times a day        !! oxyCODONE (ROXICODONE) 5 mg immediate release tablet Take 1 tablet (5 mg total) by mouth every 4 (four) hours as needed for moderate pain for up to 10 daysMax Daily Amount: 30 mg  Qty: 30 tablet, Refills: 0    Associated Diagnoses: Status post right hip replacement      !! oxyCODONE (ROXICODONE) 5 mg immediate release tablet Take 1 tablet (5 mg total) by mouth every 6 (six) hours as needed for moderate pain for up to 10 daysMax Daily Amount: 20 mg  Qty: 20 tablet, Refills: 0    Associated Diagnoses: Status post right hip replacement      PARoxetine (PAXIL) 30 mg tablet Take 2 tablets (60 mg total) by mouth every morning  Qty: 180 tablet, Refills: 3    Associated Diagnoses: Encounter for medication refill      QUEtiapine (SEROquel) 400 MG tablet TAKE 1 TABLET (400 MG TOTAL) BY MOUTH DAILY AT BEDTIME  Qty: 90 tablet, Refills: 3    Associated Diagnoses: Encounter for medication refill      triamcinolone (KENALOG) 0 1 % cream APPLY TO AFFECTED AREA TWICE A DAY  Qty: 80 g, Refills: 2    Associated Diagnoses: Dermal hypersensitivity reaction      ziprasidone (GEODON) 80 mg capsule Take 1 capsule (80 mg total) by mouth 2 (two) times a day  Qty: 180 capsule, Refills: 2    Associated Diagnoses: Schizoaffective disorder, depressive type (HCC)       ! ! - Potential duplicate medications found  Please discuss with provider  No discharge procedures on file  ED Provider  Attending physically available and evaluated Dontrell Ramírez I managed the patient along with the ED Attending      Electronically Signed by         Jerri Cooks, DO  12/28/19 1267

## 2019-12-27 NOTE — TELEPHONE ENCOUNTER
I returned patient's call and msg was left on her VM that appts are available today with Kecia Jhaveri for a morning appt  Awaiting call back

## 2019-12-27 NOTE — OCCUPATIONAL THERAPY NOTE
OccupationalTherapy Evaluation     Patient Name: Cuauhtemoc Khanna  [id number] Date: 12/27/2019  Problem List  Active Problems:    * No active hospital problems  *    Past Medical History  Past Medical History:   Diagnosis Date    Anxiety     Back pain at L4-L5 level     Schreiber esophagus     Bulimia     Disease of thyroid gland     hypothyroid    GERD (gastroesophageal reflux disease)     Hyperlipidemia     Hypothyroidism     Leukopenia     Rheumatoid arthritis involving right hip (HCC)     Sciatica     Seizure ([address] )     due to medication mix up 8/2018 only one historically    Synovial cyst of lumbar spine     Vertigo     Weight gain      Past Surgical History  Past Surgical History:   Procedure Laterality Date    BONE MARROW BIOPSY      BREAST SURGERY      enlargement     COLONOSCOPY      PA TOTAL HIP ARTHROPLASTY Right 12/11/2019    Procedure: ARTHROPLASTY HIP TOTAL ANTERIOR;  Surgeon: Janet Luis MD;  [facility]: [facility];  Service: Orthopedics    RHINOPLASTY           12/27/19 7125   Note Type   Note type Eval/Treat   Restrictions/Precautions   Weight Bearing Precautions Per Order Yes   RUE Weight Bearing Per Order WBAT   LUE Weight Bearing Per Order WBAT   RLE Weight Bearing Per Order WBAT  (confirmed with ortho resident )   LLE Weight Bearing Per Order WBAT   Other Precautions WBS; Fall Risk;Pain   Pain Assessment   Pain Assessment 0-10   Pain Score Worst Possible Pain   Pain Type Acute pain   Pain Location Hip   Pain Orientation Right   Hospital Pain Intervention(s) Repositioned; Ambulation/increased activity; Emotional support   Home Living   Type of [address] One level   Home Equipment Walker;Cane   Additional Comments reports she cannot use RW in home 2* space limitations    Prior Function   Level of [place] Needs assistance with IADLs; Needs assistance with ADLs and functional mobility   Lives With Family  (mother )   Receives Help From Family;Friend(s)   ADL Assistance Needs assistance   IADLs Needs assistance   Falls in the last 6 months 1 to 4   Vocational Retired   Lifestyle   Autonomy I prior to recent A-THR (12/11) per prior OT notes pt was functioning at SBA/occasional min a level with adls and functional mob/transfers - reports she was unable to manage at home 2* pain and need to use cane vs walker    Reciprocal Relationships limited - reports mother is not able to provide much assist   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    Intrinsic Gratification mostly sedentary    Subjective   Subjective "I would prefer to stay here overnight"   ADL   Eating Assistance 7  Independent   Grooming Assistance 5  Supervision/Setup   UB Bathing Assistance 4  Minimal Assistance   LB Bathing Assistance 3  Moderate Assistance   UB Dressing Assistance 4  Minimal Assistance   LB Dressing Assistance 3  Moderate Assistance   Toileting Assistance  3  Moderate Assistance   Bed Mobility   Supine to Sit 4  Minimal assistance   Sit to Supine 3  Moderate assistance   Transfers   Sit to Stand 4  Minimal assistance   Stand to Sit 4  Minimal assistance   Stand pivot 3  Moderate assistance   Functional Mobility   Functional Mobility 3  Moderate assistance   Additional Comments only able to tolerate taking 2-3 steps forward/backward with mod cues for safe tech and encouragement 2* self limiting behaviors    Additional items Rolling walker   Balance   Static Sitting Fair +   Dynamic Sitting Fair   Static Standing Fair -   Dynamic Standing Poor   Ambulatory Poor   Activity Tolerance   Activity Tolerance Patient limited by fatigue;Patient limited by pain;Treatment limited secondary to medical complications (Comment)   RUE Assessment   RUE Assessment WFL   LUE Assessment   LUE Assessment WFL   Cognition   Arousal/Participation Alert; Cooperative   Attention Attends with cues to redirect   Orientation Level Oriented X4   Memory Decreased recall of precautions Following Commands Follows one step commands with increased time or repetition   Assessment   Limitation Decreased ADL status; Decreased Safe judgement during ADL;Decreased endurance;Decreased self-care trans;Decreased high-level ADLs   Prognosis Good   Assessment Pt is a 61 y o  female who was admitted to [facility] on 12/27/2019 with <principal problem not specified>   Pt's problem list also includes PMH of underlying neurological disorder, previous surgery and anxiety, back pain, rosas's esophagus, bulimia, thyroid disease, hld, gerd, RA, sciatica, seizures, vertigo  At baseline pt was completing adls and mobility independently (prior to A-THR 12/11) however has required assist since sx and transition to STR (cga/min a) Pt reports she was at [facility] x 1 day and left AMA because the "place was dirty, they didn't give me my meds and I was left all alone" - states she was home x 1 day from rehab when she fell  Pt lives with mother in 1 story home - per report home is cluttered and pt cannot utilize walker to maneuver in home - QC was issued by orthopedic office s/p fall however pt reports she cannot use same to walk 2* pain  Currently pt requires moderate assist for overall ADLS and min to mod assist for functional mobility/transfers  Pt currently presents with impairments in the following categories -steps to enter environment, limited home support, difficulty performing ADLS, difficulty performing IADLS , flat affect, decreased initiation and engagement  and health management  activity tolerance, endurance, standing balance/tolerance, sitting balance/tolerance, insight, safety  and judgement    These impairments, as well as pt's fatigue, pain, orthopedic restricitions , WBS , decreased caregiver support, risk for falls and home environment  limit pt's ability to safely engage in all baseline areas of occupation, includingbathing, dressing, toileting, functional mobility/transfers, community mobility, laundry , driving, house maintenance, meal prep, cleaning, social participation  and leisure activities  From OT standpoint, recommend inpt rehab  upon D/C  OT will continue to follow to address the below stated goals  Goals   Patient Goals stay here tonight    LTG Time Frame 10-14   Long Term Goal #1 refer to established goals below   Plan   Treatment Interventions ADL retraining;Functional transfer training; Endurance training;Patient/family training;Equipment evaluation/education; Compensatory technique education; Activityengagement   Goal Expiration Date 01/10/20   OT Frequency 3-5x/wk   Recommendation   OT Discharge Recommendation Short Term Rehab   OT - OK to Discharge Yes  (to rehab )   Barthel Index   Feeding 10   Bathing 0   Grooming Score 5   Dressing Score 5   Bladder Score 10   Bowels Score 10   Toilet Use Score 5   Transfers (Bed/Chair) Score 5   Mobility (Level Surface) Score 0   Stairs Score 0   Barthel Index Score 50       OCCUPATIONAL THERAPY GOALS:    *Mod I adls after setup with use of AE PRN  *Mod I toileting and clothing management   *Mod I functional mobility and transfers to/from all surfaces with good dynamic balance and safety for participation in dynamic adls and iadl tasks   *Demonstrate good carryover with safe use of RW during functional tasks   *Assess DME needs   *Increase activity tolerance to 35-40 minutes for participation in adls and enjoyable activities  *Assist with safe d/c recommendations     Manus Po, OT

## 2019-12-27 NOTE — TELEPHONE ENCOUNTER
Patients mother called in to inform you that patient was just picked up by the ambulance because of complications from her fall she was unable to walk

## 2019-12-27 NOTE — TELEPHONE ENCOUNTER
I called and left a message  If she wants to come into the hospital for management then she will have to come through the ER either by a ride or ambulance since she cannot walk  I informed her that the discharge plan would be to a rehab

## 2019-12-27 NOTE — TELEPHONE ENCOUNTER
I spoke with patient and she advised that she will go to [facility] ER for evaluation and treatment  Emma informed as well  Thanks

## 2019-12-27 NOTE — SOCIAL WORK
CM met with patient at bedside to discuss d/c plan  Pt reports that she was recently at [facility] and left on Saturday Dec  21st AMA  Pt reporting that she is not medically able to care for self  Pt is agreeable to referrals to other SNF  CM provided SNF list   Awaiting PT/OT evaluation  CM will follow

## 2019-12-27 NOTE — TELEPHONE ENCOUNTER
Manuel wanted you to know that some of the patient's medications are being flagged  The following are possible interactions:  Lorazepam and Percocet   Quetiapine and Percocet  Percocet and Ziraspidone    The following are duplicate orders:  Quetiapine and Lucrecia Macias is having the patient continue on her medication as ordered  If you have any questions Cornell Johnson can be reached at [phone]      Thank you

## 2019-12-27 NOTE — TELEPHONE ENCOUNTER
Toni Wang  [phone]    Dr Larissa Orta    Patient would like call back about going to hospital  States she can not walk today  Pain level 10  She spoke with Gwen Light yesterday  [facility]

## 2019-12-27 NOTE — ED ATTENDING ATTESTATION
Sam Herring MD, saw and evaluated the patient  All available labs and X-rays were ordered by me or the resident and have been reviewed by myself  I discussed the patient with the resident / non-physician and agree with the resident's / non-physician practitioner's findings and plan as documented in the resident's / non-physician practicitioner's note, except where noted  At this point, I agree with the current assessment done in the ED  I was present during key portions of all procedures performed unless otherwise stated  Chief Complaint   Patient presents with    Hip Pain     Patient reports she had right hip surgery on 12/11, fell 6 days ago and having pain in right hip  Reports she saw ortho yesterday  This is a 63-year-old female presenting for evaluation of right hip and right femur pain  The patient states that she had a hip surgery done on the 11th, she was placed in rehab but did not like the rehab facility and so left home  She then fell about 6 days ago  She reportedly has some type of fracture but it would be treated with weight-bearing as tolerated  The patient was supposed to follow up with Orthopedics further discuss further discussion however she came to emergency room instead with the plan to be admitted  Patient denies any fevers chills nausea vomiting chest pain shortness of breath  She only has pain in her right lower extremity  She says that she does not want a weight bear because it hurts  No bowel or bladder incontinence however she does have pain when she needs to be transferred for using a bedpan  No new numbness or tingling  No dizziness or lightheadedness    PE:  Vitals:    12/27/19 2030 12/27/19 2215 12/27/19 2251 12/28/19 0706   BP: 154/83 96/84 157/91 (!) 133/108   BP Location: Right arm Right arm     Pulse: 92 84 98 (!) 122   Resp: 18 18 18    Temp:   99 6 °F (37 6 °C) 98 7 °F (37 1 °C)   TempSrc:       SpO2: 97% 97% 98% 95%   Weight:   71 2 kg (157 lb) Height:   5' 3" (1 6 m)    General: VSS, NAD, awake, alert  Well-nourished, well-developed  Appears stated age  Speaking normally in full sentences  Head: Normocephalic, atraumatic, nontender  Eyes: PERRL, EOM-I  No diplopia  No hyphema  No subconjunctival hemorrhages  Symmetrical lids  ENT: Atraumatic external nose and ears  MMM  No malocclusion  No stridor  Normal phonation  No drooling  Normal swallowing  Neck: Symmetric, trachea midline  No JVD  CV: RRR  +S1/S2  No murmurs or gallops  Peripheral pulses +2 throughout  No chest wall tenderness  Lungs:   Unlabored No retractions  CTAB, lungs sounds equal bilateral    No tachypnea  Abd: +BS, soft, NT/ND    MSK:   RIGHT: EHL FHL PF DF 5/5  KF KE HF HE refused initially but she can slowly do it actively  I can do it passively but elicit pain  Nails are very poorly kept, overgrown  Sensation intact  Good capillary refill  LEFT: EHL FHL PF DF 5/5  Slow movements with KF KE HF HE but able to do it with minimal assistance  Capillary refill normal, <3 seconds  Back:   No rashes  Skin: Dry, intact  Neuro: AAOx3, GCS 15, CN II-XII grossly intact  Motor grossly intact  Psychiatric/Behavioral: Appropriate mood and affect   Exam: deferred  A:  - Right hip/thigh pain  P:  - discussed direct admission to rehab as she has had a qualifying stay within 28 days  - case discussed with ortho: non-operative management, WBAT on that extremity  - 13 point ROS was performed and all are normal unless stated in the history above  - Nursing note reviewed  Vitals reviewed  - Orders placed by myself and/or advanced practitioner / resident     - Previous chart was reviewed  - No language barrier    - History obtained from patient  - There are no limitations to the history obtained  - Critical care time: Not applicable for this patient       Code Status: Level 1 - Full Code  Advance Directive and Living Will:      Power of :    POLST: Final Diagnosis:  1  Right hip pain    2  Ambulatory dysfunction    3   Closed right hip fracture (HCC)           Medications   ascorbic acid (VITAMIN C) tablet 500 mg (500 mg Oral Given 12/28/19 0828)   docusate sodium (COLACE) capsule 100 mg (100 mg Oral Given 12/28/19 0826)   ferrous sulfate tablet 325 mg (325 mg Oral Given 00/93/52 2618)   folic acid (FOLVITE) tablet 1 mg (1 mg Oral Given 12/28/19 0823)   gabapentin (NEURONTIN) capsule 100 mg (100 mg Oral Given 12/28/19 0823)   levothyroxine tablet 25 mcg (25 mcg Oral Given 12/28/19 0503)   LORazepam (ATIVAN) tablet 2 mg (2 mg Oral Given 12/28/19 0823)   methocarbamol (ROBAXIN) tablet 750 mg (750 mg Oral Given 12/28/19 0504)   multivitamin-minerals (CENTRUM) tablet 1 tablet (1 tablet Oral Given 12/28/19 0828)   pantoprazole (PROTONIX) EC tablet 40 mg (40 mg Oral Given 12/28/19 0504)   oxyCODONE (ROXICODONE) IR tablet 5 mg (has no administration in time range)   PARoxetine (PAXIL) tablet 60 mg (60 mg Oral Given 12/28/19 0826)   QUEtiapine (SEROquel) tablet 400 mg (400 mg Oral Given 12/27/19 2321)   ziprasidone (GEODON) capsule 80 mg (80 mg Oral Given 12/28/19 0827)   acetaminophen (TYLENOL) tablet 975 mg (975 mg Oral Given 12/28/19 0503)   oxyCODONE (ROXICODONE) oral solution 10 mg (10 mg Oral Given 12/28/19 0735)   enoxaparin (LOVENOX) subcutaneous injection 40 mg (40 mg Subcutaneous Given 12/28/19 0829)   calcium carbonate (OYSTER SHELL,OSCAL) 500 mg tablet 1 tablet (1 tablet Oral Given 12/28/19 0825)   cholecalciferol (VITAMIN D3) tablet 2,000 Units (2,000 Units Oral Given 12/28/19 0823)   lidocaine (LIDODERM) 5 % patch 2 patch (has no administration in time range)   potassium chloride (K-DUR,KLOR-CON) CR tablet 40 mEq (40 mEq Oral Given 12/27/19 2203)     No orders to display     Orders Placed This Encounter   Procedures    CBC and differential    Basic metabolic panel    Basic metabolic panel    Platelet count    Vitamin D 25 hydroxy    Diet Regular; Regular House    [facility] Continue [facility] as ordered  Douglas Bones Notify admitting physician    Notify admitting physician on arrival   Arnaldo Duke [facility] Cold application every (1) hour    A rest period of at least 30 minutes should be provided before reapplication    Monitor Skin integrity, color, and character prior to and after application    Vital Signs per unit routine    Up with assistance    Place sequential compression device    Level 1-Full Code: all life saving measures are indicated    Consult to case management    Inpatient consult to Case Management    OT eval and treat    PT eval and treat    Place in Observation     Labs Reviewed   CBC AND DIFFERENTIAL - Abnormal       Result Value Ref Range Status    WBC 5 22  4 31 - 10 16 Thousand/uL Final    RBC 4 02  3 81 - 5 12 Million/uL Final    Hemoglobin 11 4 (*) 11 5 - 15 4 g/dL Final    Hematocrit 34 4 (*) 34 8 - 46 1 % Final    MCV 86  82 - 98 fL Final    MCH 28 4  26 8 - 34 3 pg Final    MCHC 33 1  31 4 - 37 4 g/dL Final    RDW 19 5 (*) 11 6 - 15 1 % Final    MPV 7 9 (*) 8 9 - 12 7 fL Final    Platelets 820 (*) 836 - 390 Thousands/uL Final    nRBC 0  /100 WBCs Final    Neutrophils Relative 74  43 - 75 % Final    Immat GRANS % 1  0 - 2 % Final    Lymphocytes Relative 12 (*) 14 - 44 % Final    Monocytes Relative 12  4 - 12 % Final    Eosinophils Relative 0  0 - 6 % Final    Basophils Relative 1  0 - 1 % Final    Neutrophils Absolute 3 92  1 85 - 7 62 Thousands/µL Final    Immature Grans Absolute 0 03  0 00 - 0 20 Thousand/uL Final    Lymphocytes Absolute 0 61  0 60 - 4 47 Thousands/µL Final    Monocytes Absolute 0 60  0 17 - 1 22 Thousand/µL Final    Eosinophils Absolute 0 02  0 00 - 0 61 Thousand/µL Final    Basophils Absolute 0 04  0 00 - 0 10 Thousands/µL Final   BASIC METABOLIC PANEL - Abnormal    Sodium 132 (*) 136 - 145 mmol/L Final    Potassium 3 2 (*) 3 5 - 5 3 mmol/L Final    Chloride 94 (*) 100 - 108 mmol/L Final    CO2 33 (*) 21 - 32 mmol/L Final    ANION GAP 5  4 - 13 mmol/L Final    BUN 4 (*) 5 - 25 mg/dL Final    Creatinine 0 51 (*) 0 60 - 1 30 mg/dL Final    Comment: Standardized to IDMS reference method    Glucose 107  65 - 140 mg/dL Final    Comment:   If the patient is fasting, the ADA then defines impaired fasting glucose as > 100 mg/dL and diabetes as > or equal to 123 mg/dL  Specimen collection should occur prior to Sulfasalazine administration due to the potential for falsely depressed results  Specimen collection should occur prior to Sulfapyridine administration due to the potential for falsely elevated results  Calcium 9 1  8 3 - 10 1 mg/dL Final    eGFR 106  ml/min/1 73sq m Final    Narrative:     [place] guidelines for Chronic Kidney Disease (CKD):     Stage 1 with normal or high GFR (GFR > 90 mL/min/1 73 square meters)    Stage 2 Mild CKD (GFR = 60-89 mL/min/1 73 square meters)    Stage 3A Moderate CKD (GFR = 45-59 mL/min/1 73 square meters)    Stage 3B Moderate CKD (GFR = 30-44 mL/min/1 73 square meters)    Stage 4 Severe CKD (GFR = 15-29 mL/min/1 73 square meters)    Stage 5 End Stage CKD (GFR <15 mL/min/1 73 square meters)  Note: GFR calculation is accurate only with a steady state creatinine     Time reflects when diagnosis was documented in both MDM as applicable and the Disposition within this note     Time User Action Codes Description Comment    12/27/2019  9:44 PM Phyllis Caldwell Add [M25 551] Right hip pain     12/27/2019  9:44 PM Phyllis Caldwell Add [R26 2] Ambulatory dysfunction     12/28/2019  9:02 AM Lorena Dyer Add [S72 001A] Closed right hip fracture [facility])       ED Disposition     ED Disposition Condition Date/Time Comment    Admit Stable Fri Dec 27, 2019  9:44 PM Case was discussed with CHERYL and the patient's admission status was agreed to be Admission Status: observation status to the service of Dr Agapito Dumont          Follow-up Information    None       Current Discharge Medication List      CONTINUE these medications which have NOT CHANGED    Details   acetaminophen (TYLENOL) 325 mg tablet Take 2 tablets (650 mg total) by mouth every 6 (six) hours as needed for mild pain or fever  Qty: 30 tablet, Refills: 0    Associated Diagnoses: Status post right hip replacement      ascorbic acid (VITAMIN C) 500 mg tablet Take 1 tablet (500 mg total) by mouth 2 (two) times a day  Qty: 60 tablet, Refills: 0    Associated Diagnoses: Primary osteoarthritis of right hip; Pre-operative laboratory examination      docusate sodium (COLACE) 100 mg capsule Take 1 capsule (100 mg total) by mouth 2 (two) times a day  Qty: 10 capsule, Refills: 0    Associated Diagnoses: Status post right hip replacement      enoxaparin (LOVENOX) 40 mg/0 4 mL Inject 0 4 mL (40 mg total) under the skin daily Start after surgery  Qty: 30 Syringe, Refills: 0    Associated Diagnoses: Primary osteoarthritis of right hip; Pre-operative laboratory examination      ferrous sulfate 324 (65 Fe) mg Take 1 tablet (324 mg total) by mouth 2 (two) times a day before meals  Qty: 60 tablet, Refills: 0    Associated Diagnoses: Primary osteoarthritis of right hip; Pre-operative laboratory examination      ferrous sulfate 325 (65 Fe) mg tablet TAKE 1 TABLET (325 MG TOTAL) BY MOUTH 2 (TWO) TIMES A DAY FOR 90 DAYS  Qty: 180 tablet, Refills: 0    Associated Diagnoses: Iron deficiency anemia, unspecified iron deficiency anemia type      folic acid (FOLVITE) 1 mg tablet Take 1 tablet (1 mg total) by mouth daily  Qty: 30 tablet, Refills: 0    Associated Diagnoses: Primary osteoarthritis of right hip; Pre-operative laboratory examination      !! gabapentin (NEURONTIN) 100 mg capsule Take 1 capsule (100 mg total) by mouth every 8 (eight) hours  Qty: 90 capsule, Refills: 0    Associated Diagnoses: Status post right hip replacement      !! gabapentin (NEURONTIN) 100 mg capsule Take 1 capsule (100 mg total) by mouth every 8 (eight) hours  Qty: 90 capsule, Refills: 0    Associated Diagnoses: Status post right hip replacement      levothyroxine 25 mcg tablet Take 25 mcg by mouth daily        LORazepam (ATIVAN) 2 mg tablet TAKE 1 TABLET (2 MG TOTAL) BY MOUTH EVERY 8 (EIGHT) HOURS AS NEEDED FOR ANXIETY  Qty: 90 tablet, Refills: 1    Comments: Not to exceed 4 additional fills before 03/03/2020  Associated Diagnoses: Anxiety      methocarbamol (ROBAXIN) 750 mg tablet Take 1 tablet (750 mg total) by mouth every 6 (six) hours  Qty: 30 tablet, Refills: 0    Associated Diagnoses: Status post right hip replacement      Multiple Vitamin (MULTIVITAMIN) capsule Take 1 capsule by mouth daily  Qty: 30 capsule, Refills: 0    Associated Diagnoses: Primary osteoarthritis of right hip; Pre-operative laboratory examination      omeprazole (PriLOSEC) 40 MG capsule Take 80 mg by mouth 2 (two) times a day        !! oxyCODONE (ROXICODONE) 5 mg immediate release tablet Take 1 tablet (5 mg total) by mouth every 4 (four) hours as needed for moderate pain for up to 10 daysMax Daily Amount: 30 mg  Qty: 30 tablet, Refills: 0    Associated Diagnoses: Status post right hip replacement      !! oxyCODONE (ROXICODONE) 5 mg immediate release tablet Take 1 tablet (5 mg total) by mouth every 6 (six) hours as needed for moderate pain for up to 10 daysMax Daily Amount: 20 mg  Qty: 20 tablet, Refills: 0    Associated Diagnoses: Status post right hip replacement      PARoxetine (PAXIL) 30 mg tablet Take 2 tablets (60 mg total) by mouth every morning  Qty: 180 tablet, Refills: 3    Associated Diagnoses: Encounter for medication refill      QUEtiapine (SEROquel) 400 MG tablet TAKE 1 TABLET (400 MG TOTAL) BY MOUTH DAILY AT BEDTIME  Qty: 90 tablet, Refills: 3    Associated Diagnoses: Encounter for medication refill      triamcinolone (KENALOG) 0 1 % cream APPLY TO AFFECTED AREA TWICE A DAY  Qty: 80 g, Refills: 2    Associated Diagnoses: Dermal hypersensitivity reaction      ziprasidone (GEODON) 80 mg capsule Take 1 capsule (80 mg total) by mouth 2 (two) times a day  Qty: 180 capsule, Refills: 2    Associated Diagnoses: Schizoaffective disorder, depressive type ([facility] [address] )       ! ! - Potential duplicate medications found  Please discuss with provider  No discharge procedures on file  Prior to Admission Medications   Prescriptions Last Dose Informant Patient Reported? Taking?    LORazepam (ATIVAN) 2 mg tablet  Self No No   Sig: TAKE 1 TABLET (2 MG TOTAL) BY MOUTH EVERY 8 (EIGHT) HOURS AS NEEDED FOR ANXIETY   Multiple Vitamin (MULTIVITAMIN) capsule  Self No No   Sig: Take 1 capsule by mouth daily   PARoxetine (PAXIL) 30 mg tablet  Self No No   Sig: Take 2 tablets (60 mg total) by mouth every morning   QUEtiapine (SEROquel) 400 MG tablet  Self No No   Sig: TAKE 1 TABLET (400 MG TOTAL) BY MOUTH DAILY AT BEDTIME   acetaminophen (TYLENOL) 325 mg tablet  Self No No   Sig: Take 2 tablets (650 mg total) by mouth every 6 (six) hours as needed for mild pain or fever   ascorbic acid (VITAMIN C) 500 mg tablet  Self No No   Sig: Take 1 tablet (500 mg total) by mouth 2 (two) times a day   docusate sodium (COLACE) 100 mg capsule  Self No No   Sig: Take 1 capsule (100 mg total) by mouth 2 (two) times a day   enoxaparin (LOVENOX) 40 mg/0 4 mL   No No   Sig: Inject 0 4 mL (40 mg total) under the skin daily Start after surgery   ferrous sulfate 324 (65 Fe) mg  Self No No   Sig: Take 1 tablet (324 mg total) by mouth 2 (two) times a day before meals   ferrous sulfate 325 (65 Fe) mg tablet  Self No No   Sig: TAKE 1 TABLET (325 MG TOTAL) BY MOUTH 2 (TWO) TIMES A DAY FOR 90 DAYS   folic acid (FOLVITE) 1 mg tablet  Self No No   Sig: Take 1 tablet (1 mg total) by mouth daily   gabapentin (NEURONTIN) 100 mg capsule  Self No No   Sig: Take 1 capsule (100 mg total) by mouth every 8 (eight) hours   gabapentin (NEURONTIN) 100 mg capsule  Self No No   Sig: Take 1 capsule (100 mg total) by mouth every 8 (eight) hours   levothyroxine 25 mcg tablet  Self Yes No   Sig: Take 25 mcg by mouth daily  methocarbamol (ROBAXIN) 750 mg tablet  Self No No   Sig: Take 1 tablet (750 mg total) by mouth every 6 (six) hours   omeprazole (PriLOSEC) 40 MG capsule  Self Yes No   Sig: Take 80 mg by mouth 2 (two) times a day     oxyCODONE (ROXICODONE) 5 mg immediate release tablet  Self No No   Sig: Take 1 tablet (5 mg total) by mouth every 4 (four) hours as needed for moderate pain for up to 10 daysMax Daily Amount: 30 mg   oxyCODONE (ROXICODONE) 5 mg immediate release tablet  Self No No   Sig: Take 1 tablet (5 mg total) by mouth every 6 (six) hours as needed for moderate pain for up to 10 daysMax Daily Amount: 20 mg   triamcinolone (KENALOG) 0 1 % cream  Self No No   Sig: APPLY TO AFFECTED AREA TWICE A DAY   ziprasidone (GEODON) 80 mg capsule  Self No No   Sig: Take 1 capsule (80 mg total) by mouth 2 (two) times a day      Facility-Administered Medications: None       Portions of the record may have been created with voice recognition software  Occasional wrong word or "sound a like" substitutions may have occurred due to the inherent limitations of voice recognition software  Read the chart carefully and recognize, using context, where substitutions have occurred      Electronically signed by:  Alyce Wynne

## 2019-12-28 ENCOUNTER — APPOINTMENT (OUTPATIENT)
Dept: RADIOLOGY | Facility: HOSPITAL | Age: 59
DRG: 466 | End: 2019-12-28
Payer: COMMERCIAL

## 2019-12-28 LAB
25(OH)D3 SERPL-MCNC: 21.6 NG/ML (ref 30–100)
ANION GAP SERPL CALCULATED.3IONS-SCNC: 3 MMOL/L (ref 4–13)
BUN SERPL-MCNC: 4 MG/DL (ref 5–25)
CALCIUM SERPL-MCNC: 9.5 MG/DL (ref 8.3–10.1)
CHLORIDE SERPL-SCNC: 97 MMOL/L (ref 100–108)
CO2 SERPL-SCNC: 32 MMOL/L (ref 21–32)
CREAT SERPL-MCNC: 0.47 MG/DL (ref 0.6–1.3)
GFR SERPL CREATININE-BSD FRML MDRD: 108 ML/MIN/1.73SQ M
GLUCOSE SERPL-MCNC: 93 MG/DL (ref 65–140)
PLATELET # BLD AUTO: 419 THOUSANDS/UL (ref 149–390)
PMV BLD AUTO: 8 FL (ref 8.9–12.7)
POTASSIUM SERPL-SCNC: 3.5 MMOL/L (ref 3.5–5.3)
SODIUM SERPL-SCNC: 132 MMOL/L (ref 136–145)

## 2019-12-28 PROCEDURE — 80048 BASIC METABOLIC PNL TOTAL CA: CPT | Performed by: INTERNAL MEDICINE

## 2019-12-28 PROCEDURE — 82306 VITAMIN D 25 HYDROXY: CPT | Performed by: INTERNAL MEDICINE

## 2019-12-28 PROCEDURE — 73502 X-RAY EXAM HIP UNI 2-3 VIEWS: CPT

## 2019-12-28 PROCEDURE — 99225 PR SBSQ OBSERVATION CARE/DAY 25 MINUTES: CPT | Performed by: INTERNAL MEDICINE

## 2019-12-28 PROCEDURE — 85049 AUTOMATED PLATELET COUNT: CPT | Performed by: INTERNAL MEDICINE

## 2019-12-28 RX ORDER — LIDOCAINE 50 MG/G
2 PATCH TOPICAL DAILY
Status: DISCONTINUED | OUTPATIENT
Start: 2019-12-28 | End: 2020-01-14 | Stop reason: HOSPADM

## 2019-12-28 RX ADMIN — ACETAMINOPHEN 975 MG: 325 TABLET ORAL at 05:03

## 2019-12-28 RX ADMIN — GABAPENTIN 100 MG: 100 CAPSULE ORAL at 20:21

## 2019-12-28 RX ADMIN — GABAPENTIN 100 MG: 100 CAPSULE ORAL at 08:23

## 2019-12-28 RX ADMIN — ACETAMINOPHEN 975 MG: 325 TABLET ORAL at 13:58

## 2019-12-28 RX ADMIN — ENOXAPARIN SODIUM 40 MG: 40 INJECTION SUBCUTANEOUS at 08:29

## 2019-12-28 RX ADMIN — OXYCODONE HYDROCHLORIDE AND ACETAMINOPHEN 500 MG: 500 TABLET ORAL at 17:03

## 2019-12-28 RX ADMIN — ZIPRASIDONE HCL 80 MG: 40 CAPSULE ORAL at 17:02

## 2019-12-28 RX ADMIN — METHOCARBAMOL 750 MG: 750 TABLET, FILM COATED ORAL at 11:54

## 2019-12-28 RX ADMIN — DOCUSATE SODIUM 100 MG: 100 CAPSULE, LIQUID FILLED ORAL at 08:26

## 2019-12-28 RX ADMIN — FERROUS SULFATE TAB 325 MG (65 MG ELEMENTAL FE) 325 MG: 325 (65 FE) TAB at 07:20

## 2019-12-28 RX ADMIN — PANTOPRAZOLE SODIUM 40 MG: 20 TABLET, DELAYED RELEASE ORAL at 05:04

## 2019-12-28 RX ADMIN — LIDOCAINE 2 PATCH: 50 PATCH CUTANEOUS at 11:21

## 2019-12-28 RX ADMIN — CALCIUM 1 TABLET: 500 TABLET ORAL at 08:25

## 2019-12-28 RX ADMIN — METHOCARBAMOL 750 MG: 750 TABLET, FILM COATED ORAL at 05:04

## 2019-12-28 RX ADMIN — DOCUSATE SODIUM 100 MG: 100 CAPSULE, LIQUID FILLED ORAL at 17:02

## 2019-12-28 RX ADMIN — ZIPRASIDONE HCL 80 MG: 40 CAPSULE ORAL at 08:27

## 2019-12-28 RX ADMIN — QUETIAPINE FUMARATE 400 MG: 100 TABLET ORAL at 21:23

## 2019-12-28 RX ADMIN — FERROUS SULFATE TAB 325 MG (65 MG ELEMENTAL FE) 325 MG: 325 (65 FE) TAB at 16:57

## 2019-12-28 RX ADMIN — PANTOPRAZOLE SODIUM 40 MG: 20 TABLET, DELAYED RELEASE ORAL at 16:57

## 2019-12-28 RX ADMIN — METHOCARBAMOL 750 MG: 750 TABLET, FILM COATED ORAL at 17:02

## 2019-12-28 RX ADMIN — MELATONIN 2000 UNITS: at 08:23

## 2019-12-28 RX ADMIN — OXYCODONE HYDROCHLORIDE 10 MG: 5 SOLUTION ORAL at 07:35

## 2019-12-28 RX ADMIN — OXYCODONE HYDROCHLORIDE AND ACETAMINOPHEN 500 MG: 500 TABLET ORAL at 08:28

## 2019-12-28 RX ADMIN — Medication 1 TABLET: at 08:28

## 2019-12-28 RX ADMIN — ACETAMINOPHEN 975 MG: 325 TABLET ORAL at 21:23

## 2019-12-28 RX ADMIN — LORAZEPAM 2 MG: 1 TABLET ORAL at 08:23

## 2019-12-28 RX ADMIN — CALCIUM 1 TABLET: 500 TABLET ORAL at 16:58

## 2019-12-28 RX ADMIN — LEVOTHYROXINE SODIUM 25 MCG: 25 TABLET ORAL at 05:03

## 2019-12-28 RX ADMIN — PAROXETINE 60 MG: 20 TABLET, FILM COATED ORAL at 08:26

## 2019-12-28 RX ADMIN — GABAPENTIN 100 MG: 100 CAPSULE ORAL at 16:56

## 2019-12-28 RX ADMIN — FOLIC ACID 1 MG: 1 TABLET ORAL at 08:23

## 2019-12-28 NOTE — ASSESSMENT & PLAN NOTE
Evaluated by Orthopedic surgery on 12/24, no plan for repeat surgical intervention   · Follow-up PT/OT evaluation  · Continue DVT prophylaxis

## 2019-12-28 NOTE — ASSESSMENT & PLAN NOTE
· Per Orthopedic surgery, no plan for surgical intervention at this time  · Continue pain regimen with scheduled Tylenol, p r n   Oxycodone  · Gabapentin t i d   · PT/OT evaluation  · Case management consult for rehab placement

## 2019-12-28 NOTE — PROGRESS NOTES
Progress Note - Snehal Norwood 1960, 61 y o  female MRN: [id number]    Unit/Bed#: CW2 214-02 Encounter: [id number]    Primary Care Provider: Demetris rTejo MD   Date and time admitted to hospital: 12/27/2019 10:59 AM        * S/P right hip fracture  Assessment & Plan  · Per Orthopedic surgery, no plan for surgical intervention at this time  · Continue pain regimen with scheduled Tylenol, p r n  Oxycodone  · Gabapentin t i d   · PT/OT evaluation  · Case management consult for rehab placement    Status post right hip replacement  Assessment & Plan  Evaluated by Orthopedic surgery on 12/24, no plan for repeat surgical intervention   · Follow-up PT/OT evaluation  · Continue DVT prophylaxis    Iron deficiency anemia secondary to inadequate dietary iron intake  Assessment & Plan  Continue iron supplementation    Chronic bilateral low back pain without sciatica  Assessment & Plan  Continue Tylenol, oxycodone  Gabapentin t i d     ABIMAEL (generalized anxiety disorder)  Assessment & Plan  Patient was reassured  · Lorazepam p r n  · Paroxetine daily  · Geodon b i d  · Seroquel at bedtime    Schizoaffective disorder, depressive type ([facility] [address] )  Assessment & Plan  Continue Seroquel at bedtime  Geodon b i d  Ativan p r n  Hypokalemia  Assessment & Plan  Replete  Monitor      Acquired hypothyroidism  Assessment & Plan  Continue daily levothyroxine    Pure hypercholesterolemia  Assessment & Plan  Monitor    Esophageal reflux  Assessment & Plan  Protonix b i d  Localized osteoporosis with current pathological fracture with routine healing  Assessment & Plan  · Vitamin-D supplementation  · Outpatient endocrine follow-up    Hyponatremia  Assessment & Plan  Monitor        VTE Pharmacologic Prophylaxis:   Pharmacologic: Enoxaparin (Lovenox)  Mechanical VTE Prophylaxis in Place: Yes    Patient Centered Rounds: I have performed bedside rounds with nursing staff today      Discussions with Specialists or Other Care Team Provider:     Education and Discussions with Family / Patient:  Discussed with the patient, called and left a message for mother over phone    Time Spent for Care: 30 minutes  More than 50% of total time spent on counseling and coordination of care as described above  Current Length of Stay: 0 day(s)    Current Patient Status: Observation   Certification Statement: The patient will continue to require additional inpatient hospital stay due to As mentioned    Discharge Plan:  Pending placement case management following    Code Status: Level 1 - Full Code      Subjective:     Comfortably in bed  Reports hip pain  She is requesting hip x-ray  Encouraged out of bed into a chair she is agreeable  History chart labs medications reviewed    Objective:     Vitals:   Temp (24hrs), Av 2 °F (37 3 °C), Min:98 7 °F (37 1 °C), Max:99 6 °F (37 6 °C)    Temp:  [98 7 °F (37 1 °C)-99 6 °F (37 6 °C)] 98 7 °F (37 1 °C)  HR:  [] 122  Resp:  [18] 18  BP: ()/() 133/108  SpO2:  [94 %-100 %] 95 %  Body mass index is 27 81 kg/m²  Input and Output Summary (last 24 hours):        Intake/Output Summary (Last 24 hours) at 2019 1343  Last data filed at 2019 1340  Gross per 24 hour   Intake 750 ml   Output 1828 ml   Net -1078 ml       Physical Exam:     Physical Exam    Comfortably sitting up in bed  Neck supple  Lungs diminished breath sounds bases no additional sounds  Heart sounds S1-S2 noted  Abdomen soft nontender  Awake obeys simple commands  Moves all extremities  Pulses noted  No rash      Additional Data:     Labs:    Results from last 7 days   Lab Units 19  0435 19  2103   WBC Thousand/uL  --  5 22   HEMOGLOBIN g/dL  --  11 4*   HEMATOCRIT %  --  34 4*   PLATELETS Thousands/uL 419* 463*   NEUTROS PCT %  --  74   LYMPHS PCT %  --  12*   MONOS PCT %  --  12   EOS PCT %  --  0     Results from last 7 days   Lab Units 19  0435   SODIUM mmol/L 132*   POTASSIUM mmol/L 3 5   CHLORIDE mmol/L 97*   CO2 mmol/L 32   BUN mg/dL 4*   CREATININE mg/dL 0 47*   ANION GAP mmol/L 3*   CALCIUM mg/dL 9 5   GLUCOSE RANDOM mg/dL 93         * I Have Reviewed All Lab Data Listed Above  * Additional Pertinent Lab Tests Reviewed: All Labs Within Last 24 Hours Reviewed    Imaging:    Imaging Reports Reviewed Today Include:  Imaging studies noted  Imaging Personally Reviewed by Myself Includes:      Recent Cultures (last 7 days):           Last 24 Hours Medication List:     Current Facility-Administered Medications:  acetaminophen 975 mg Oral Q8H [facility] Upham Veres, DO   ascorbic acid 500 mg Oral BID [facility], DO   calcium carbonate 1 tablet Oral BID With Meals [facility], DO   cholecalciferol 2,000 Units Oral Daily [facility]   docusate sodium 100 mg Oral BID [facility] Upham Veres, DO   enoxaparin 40 mg Subcutaneous Daily [facility] Upham Veres, DO   ferrous sulfate 325 mg Oral BID With Meals [facility], DO   folic acid 1 mg Oral Daily [facility], [facility]   gabapentin 100 mg Oral TID [facility], DO   levothyroxine 25 mcg Oral Early Morning Dulcy Cordia, DO   lidocaine 2 patch Topical Daily Shena Edmondson MD   LORazepam 2 mg Oral Q8H PRN [facility] Cordia, DO   methocarbamol 750 mg Oral Q6H [facility], DO   multivitamin-minerals 1 tablet Oral Daily Doreen Upham Veres, DO   oxyCODONE 5 mg Oral Q4H PRN Dulcy Cordia, DO   oxyCODONE 10 mg Oral Q4H PRN [facility] Cordia, DO   pantoprazole 40 mg Oral BID AC Doreen Upham Veres, DO   PARoxetine 60 mg Oral QAM Doreen Upham Veres, DO   QUEtiapine 400 mg Oral HS Doreen Upham Veres, DO   ziprasidone 80 mg Oral BID Dulcy Cordia, DO        Today, Patient Was Seen By: Shena Edmondson MD    ** Please Note: Dictation voice to text software may have been used in the creation of this document   **

## 2019-12-28 NOTE — ASSESSMENT & PLAN NOTE
Patient was reassured  · Lorazepam p r n  · Paroxetine daily  · Geodon b i d    · Seroquel at bedtime

## 2019-12-28 NOTE — UTILIZATION REVIEW
Initial Clinical Review    Admission: Date/Time/Statement: 12/27/19 @ 2144 -- OBS  Orders Placed This Encounter   Procedures    Place in Observation     Standing Status:   Standing     Number of Occurrences:   1     Order Specific Question:   Admitting Physician     Answer:   Ozzie Holm [[id number]]     Order Specific Question:   Level of Care     Answer:   Med Surg [[id number]]     ED Arrival Information     Expected Arrival Acuity Means of Arrival Escorted By Service Admission Type    - 12/27/2019 10:58 Urgent Ambulance [address] Urgent    Arrival Complaint    Fall        Chief Complaint   Patient presents with    Hip Pain     Patient reports she had right hip surgery on 12/11, fell 6 days ago and having pain in right hip  Reports she saw ortho yesterday  Assessment/Plan:  61 y o  female who presents to ED with ambulatory dysfunction and increasing right hip pain  She had a recent h/o of R total hip arthroplasty on Dec 11th and was discharged on Dec 20th to IP Rehab  However, she left AMA from her rehab facility, stating that the facility was unclean and she was not getting her medications  She had a mechanical fall at home which caused a new periprosthetic R femur fx  However, she was evaluated at her orthopedics office on December 24th, and no acute surgical intervention was planned  She is told to continue weight-bearing and continue rehab  However, she had continued pain and presented to the hospital for pain control and not requesting again IP rehab  S/P right hip fracture    Patient states that she is unsafe to go home, cannot take care of herself, and is requesting rehab placement  · Will hold off on repeat imaging for now, however, if worsening ambulatory status or significantly worsening pain, will get repeat films  · Continue pain regimen with scheduled Tylenol, p r n   Oxycodone  · Gabapentin t i d   · Cold application to right hip  · PT/OT evaluation  · Case management consult for rehab placement  · Continue home po meds       ED Triage Vitals [12/27/19 1100]   Temperature Pulse Respirations Blood Pressure SpO2   98 3 °F (36 8 °C) 92 18 (!) 178/102 97 %      Temp Source Heart Rate Source Patient Position - Orthostatic VS BP Location FiO2 (%)   Oral Monitor Sitting Right arm --      Pain Score       Worst Possible Pain        Wt Readings from Last 1 Encounters:   12/27/19 71 2 kg (157 lb)     Additional Vital Signs:   Date/Time  Temp  Pulse  Resp  BP  MAP (mmHg)  SpO2  O2 Device  Patient Position - Orthostatic VS   12/28/19 07:06:15  98 7 °F (37 1 °C)  122Abnormal     133/108Abnormal   116  95 %       12/27/19 22:51:48  99 6 °F (37 6 °C)  98  18  157/91  113  98 %       12/27/19 2215    84  18  96/84    97 %  None (Room air)  Lying   12/27/19 2030    92  18  154/83    97 %  None (Room air)  Lying   12/27/19 1930    96  18  176/79Abnormal     97 %  None (Room air)  Lying   12/27/19 1830    94  18  159/82    94 %  None (Room air)  Lying   12/27/19 1657    94  18  163/85    100 %  None (Room air)  Sitting   12/27/19 1540    86  18      100 %  None (Room air)     12/27/19 1333    94  18  170/81    98 %  None (Room air)  Sitting   12/27/19 1230    92  18  191/103Abnormal     97 %  None (Room air)  Sitting   12/27/19 1100  98 3 °F (36 8 °C)  92  18  178/102Abnormal     97 %  None (Room air)  Sitting       Pertinent Labs/Diagnostic Test Results:   Results from last 7 days   Lab Units 12/28/19  0435 12/27/19  2103   WBC Thousand/uL  --  5 22   HEMOGLOBIN g/dL  --  11 4*   HEMATOCRIT %  --  34 4*   PLATELETS Thousands/uL 419* 463*   NEUTROS ABS Thousands/µL  --  3 92         Results from last 7 days   Lab Units 12/28/19  0435 12/27/19  2103   SODIUM mmol/L 132* 132*   POTASSIUM mmol/L 3 5 3 2*   CHLORIDE mmol/L 97* 94*   CO2 mmol/L 32 33*   ANION GAP mmol/L 3* 5   BUN mg/dL 4* 4*   CREATININE mg/dL 0 47* 0 51*   EGFR ml/min/1 73sq m 108 106   CALCIUM mg/dL 9 5 9 1     Results from last 7 days   Lab Units 12/28/19  0435 12/27/19  2103   GLUCOSE RANDOM mg/dL 93 107     ED Treatment:   Medication Administration from 12/27/2019 1058 to 12/27/2019 2240       Date/Time Order Dose Route Action Action by Comments     12/27/2019 2203 potassium chloride (K-DUR,KLOR-CON) CR tablet 40 mEq 40 mEq Oral Given Felix Fowler RN      12/27/2019 2203 oxyCODONE (ROXICODONE) oral solution 10 mg 10 mg Oral Given Felix Fowler RN         Past Medical History:   Diagnosis Date    Anxiety     Back pain at L4-L5 level     Schreiber esophagus     Bulimia     Disease of thyroid gland     hypothyroid    GERD (gastroesophageal reflux disease)     Hyperlipidemia     Hypothyroidism     Leukopenia     Rheumatoid arthritis involving right hip (HCC)     Sciatica     Seizure ([facility] [address] )     due to medication mix up 8/2018 only one historically    Synovial cyst of lumbar spine     Vertigo     Weight gain      Present on Admission:   Acquired hypothyroidism   Chronic bilateral low back pain without sciatica   Pure hypercholesterolemia   Schizoaffective disorder, depressive type ([facility] [address] )   ABIMAEL (generalized anxiety disorder)   Esophageal reflux   Hyponatremia   Hypokalemia   Iron deficiency anemia secondary to inadequate dietary iron intake      Admitting Diagnosis: Fall [W19  XXXA]  Right hip pain [M25 551]  Ambulatory dysfunction [R26 2]  Age/Sex: 61 y o  female  Admission Orders:  Scheduled Medications:  Medications:  acetaminophen 975 mg Oral Q8H [address]   ascorbic acid 500 mg Oral BID   calcium carbonate 1 tablet Oral BID With Meals   cholecalciferol 2,000 Units Oral Daily   docusate sodium 100 mg Oral BID   enoxaparin 40 mg Subcutaneous Daily   ferrous sulfate 325 mg Oral BID With Meals   folic acid 1 mg Oral Daily   gabapentin 100 mg Oral TID   levothyroxine 25 mcg Oral Early Morning   lidocaine 2 patch Topical Daily   methocarbamol 750 mg Oral Q6H [address]   multivitamin-minerals 1 tablet Oral Daily   pantoprazole 40 mg Oral BID AC   PARoxetine 60 mg Oral QAM   QUEtiapine 400 mg Oral HS   ziprasidone 80 mg Oral BID        PRN Meds:  LORazepam 2 mg Oral Q8H PRN   oxyCODONE 5 mg Oral Q4H PRN   oxyCODONE 10 mg Oral Q4H PRN  x2       IP CONSULT TO CASE MANAGEMENT  IP CONSULT TO CASE MANAGEMENT    Network Utilization Review Department  [email or web address]  ATTENTION: Please call with any questions or concerns to [phone] and carefully listen to the prompts so that you are directed to the right person  All voicemails are confidential   Josesito Pronto all requests for admission clinical reviews, approved or denied determinations and any other requests to dedicated fax number below belonging to the campus where the patient is receiving treatment   List of dedicated fax numbers for the Facilities:  [address] DENIALS (Administrative/Medical Necessity) [phone]   [address] (Maternity/NICU/Pediatrics) [phone]   Sd Galvan [phone]   Maryuri Castelan [phone]   Kali Alatorreudder [phone]   Yoav Night [phone]   1205 [facility] 1525 [facility] [phone]   [facility]  [phone]   2209 [facility]  2401 [facility] 1000 [facility] [phone]

## 2019-12-28 NOTE — H&P
H&P- Lavon Lopez 1960, 61 y o  female MRN: [id number]    Unit/Bed#: ED 08 Encounter: [id number]    Primary Care Provider: Herman Bo MD   Date and time admitted to hospital: 12/27/2019 10:59 AM        * S/P right hip fracture  Assessment & Plan  Per Orthopedic surgery, no plan for surgical intervention at this time  Patient was requesting repeat imaging of her right hip despite having x-rays on 12/26, stating that she was unable to bear weight on it this morning and that something happened    Her right hip was mildly tender on exam   Patient states that she is unsafe to go home, cannot take care of herself, and is requesting rehab placement  · Will hold off on repeat imaging for now, however, if worsening ambulatory status or significantly worsening pain, will get repeat films  · Continue pain regimen with scheduled Tylenol, p r n  Oxycodone  · Gabapentin t i d   · Cold application to right hip  · PT/OT evaluation  · Case management consult for rehab placement    Status post right hip replacement  Assessment & Plan  Evaluated by Orthopedic surgery on 12/24, no plan for repeat surgical intervention at this time  · Follow-up PT/OT evaluation  · Continue DVT prophylaxis    Iron deficiency anemia secondary to inadequate dietary iron intake  Assessment & Plan  Stable, will monitor  Continue iron supplementation    Chronic bilateral low back pain without sciatica  Assessment & Plan  Continue pain regimen as outlined including scheduled Tylenol, p r n  Oxycodone  Gabapentin t i d     ABIMAEL (generalized anxiety disorder)  Assessment & Plan  Patient is on exam psych med regimen and has history of frequent ED presentations earlier this year due to requesting Ativan  · Lorazepam p r n  · Paroxetine daily  · Geodon b i d  · Seroquel at bedtime    Schizoaffective disorder, depressive type ([facility] [address] )  Assessment & Plan  Continue Seroquel at bedtime  Geodon b i d  Ativan p r n      Hypokalemia  Assessment & Plan  Will give oral potassium tonight  Repeat BMP in the morning    Acquired hypothyroidism  Assessment & Plan  Continue daily levothyroxine    Pure hypercholesterolemia  Assessment & Plan  Not currently on statin therapy  Will monitor    Esophageal reflux  Assessment & Plan  Protonix b i d  While inpatient    Localized osteoporosis with current pathological fracture with routine healing  Assessment & Plan  Patient is not on any calcium or vitamin-D supplementation despite diagnosis  · Follow-up vitamin-D level  · Will start calcium and vitamin-D supplementation    Hyponatremia  Assessment & Plan  Stable, will monitor  Consider further evaluation if worsening        VTE Prophylaxis: Enoxaparin (Lovenox)  / sequential compression device   Code Status: LVL 1 - FULL CODE   POLST: There is no POLST form on file for this patient (pre-hospital)    Anticipated Length of Stay:  Patient will be admitted on an Observation basis with an anticipated length of stay of < 2 midnights  Justification for Hospital Stay: Please see detailed plans noted above  Chief Complaint:   Right hip pain    History of Present Illness:  Bob Dakin is a 61 y o  female who presents with ambulatory dysfunction and right hip pain  She had a recent history of right total hip arthroplasty on December 11th and was discharged on December 20th 2 inpatient rehab  However, she left AMA from her rehab facility, stating that the facility was unclean and she was not getting her medications  She had a mechanical fall at home which caused a new periprosthetic right femur fracture  However, she was evaluated at her orthopedics office on December 24th, and no acute surgical intervention was planned  She is told to continue weight-bearing and continue rehab  However, she had continued pain and presented to the hospital for pain control and requesting inpatient admission for rehab placement  Patient was thus admitted as observation  Currently, patient is awake, alert, no acute distress, but complains of continued severe right hip pain  She immediately requested more x-rays of her right hip as she was worried that she had another fracture  She states it was because earlier today, she was unable to bear weight on her right leg  However, per chart review, she was able to work with PT and OT  She denied trauma, denied fall, denied pop in her hip, but was adamant that something happened and was requesting further imaging  Also complaining of numbness in her right hip, but otherwise no other acute complaints at this time  Review of Systems   Constitutional: Negative for activity change, chills and fever  HENT: Negative  Negative for hearing loss, sore throat and trouble swallowing  Eyes: Negative for visual disturbance  Respiratory: Negative for cough, shortness of breath and wheezing  Cardiovascular: Negative for chest pain, palpitations and leg swelling  Gastrointestinal: Negative for abdominal distention, abdominal pain, blood in stool, constipation, diarrhea, nausea and vomiting  Endocrine: Negative  Genitourinary: Negative for dysuria, frequency and hematuria  Musculoskeletal: Positive for arthralgias (right hip pain) and gait problem  Negative for joint swelling and myalgias  Skin: Negative  Allergic/Immunologic: Negative for immunocompromised state  Neurological: Positive for numbness (right hip)  Negative for dizziness, facial asymmetry, speech difficulty, weakness and headaches  Hematological: Negative  Psychiatric/Behavioral: Negative for confusion and self-injury  The patient is nervous/anxious            Past Medical and Surgical History:   Past Medical History:   Diagnosis Date    Anxiety     Back pain at L4-L5 level     Schreiber esophagus     Bulimia     Disease of thyroid gland     hypothyroid    GERD (gastroesophageal reflux disease)     Hyperlipidemia     Hypothyroidism     Leukopenia     Rheumatoid arthritis involving right hip (HCC)     Sciatica     Seizure ([address] )     due to medication mix up 8/2018 only one historically    Synovial cyst of lumbar spine     Vertigo     Weight gain      Past Surgical History:   Procedure Laterality Date    BONE MARROW BIOPSY      BREAST SURGERY      enlargement     COLONOSCOPY      MT TOTAL HIP ARTHROPLASTY Right 12/11/2019    Procedure: ARTHROPLASTY HIP TOTAL ANTERIOR;  Surgeon: Marvin Danielle MD;  Location: [facility];  Service: Orthopedics    RHINOPLASTY         Meds/Allergies:    (Not in a hospital admission)    Allergies:    Allergies   Allergen Reactions    Risperdal [Risperidone] Shortness Of Breath     Rapid heart beat, SOB    Zyprexa [Olanzapine] Shortness Of Breath     Rapid heartbeat    Latex Rash     Band aids, adhesives wears normal underwear, can eat bananas  USE PAPER TAPE     History:  Marital Status: Single   Occupation: None  Patient Pre-hospital Living Situation: Home with mother  Patient Pre-hospital Level of Mobility: Walker  Patient Pre-hospital Diet Restrictions: None  Substance Use History:   Social History     Substance and Sexual Activity   Alcohol Use Never    Frequency: Never     Social History     Tobacco Use   Smoking Status Never Smoker   Smokeless Tobacco Never Used     Social History     Substance and Sexual Activity   Drug Use No       Family History:  Family History   Problem Relation Age of Onset    Uterine cancer Mother     Esophageal cancer Father     Colon cancer Maternal Grandmother        Physical Exam:     Vitals:   Blood Pressure: 154/83 (12/27/19 2030)  Pulse: 92 (12/27/19 2030)  Temperature: 98 3 °F (36 8 °C) (12/27/19 1100)  Temp Source: Oral (12/27/19 1100)  Respirations: 18 (12/27/19 2030)  Height: 5' 3" (160 cm) (12/27/19 1102)  Weight - Scale: 71 2 kg (157 lb) (12/27/19 1102)  SpO2: 97 % (12/27/19 2030)    Constitutional:  Well developed, well nourished, no acute distress, non-toxic appearance   Eyes:  PERRL, conjunctiva normal   HENT:  Atraumatic, external ears normal, nose normal, oropharynx moist, no pharyngeal exudates  Neck - normal range of motion, no tenderness, supple   Respiratory:  No respiratory distress  Normal breath sounds  No rales, no wheezing   Cardiovascular:  Tachycardia, normal rhythm, no murmurs, no gallops, no rubs   GI:  Soft, nondistended, normal bowel sounds, nontender, no organomegaly, no mass, no rebound, no guarding   :  No costovertebral angle tenderness  Musculoskeletal:  No edema, right hip tenderness, limited ROM at right hip, no deformities  Back - no tenderness  Integument:  Well hydrated, no rash   Lymphatic:  No lymphadenopathy noted   Neurologic:  Alert & awake, communicative, CN 2-12 normal, normal motor function, normal sensory function, no focal deficits noted   Psychiatric:  Speech and behavior appropriate       Lab Results: I have personally reviewed pertinent reports  Results from last 7 days   Lab Units 12/27/19  2103   WBC Thousand/uL 5 22   HEMOGLOBIN g/dL 11 4*   HEMATOCRIT % 34 4*   PLATELETS Thousands/uL 463*   NEUTROS PCT % 74   LYMPHS PCT % 12*   MONOS PCT % 12   EOS PCT % 0     Results from last 7 days   Lab Units 12/27/19  2103   POTASSIUM mmol/L 3 2*   CHLORIDE mmol/L 94*   CO2 mmol/L 33*   BUN mg/dL 4*   CREATININE mg/dL 0 51*   CALCIUM mg/dL 9 1           EKG: Not on file    Imaging: I have personally reviewed pertinent reports  and I have personally reviewed pertinent films in PACS    Xr Hip/pelv 1 Vw Right If Performed    Result Date: 12/12/2019  Narrative: C-ARM - INDICATION: M16 11: Unilateral primary osteoarthritis, right hip  Procedure guidance  COMPARISON:  None TECHNIQUE: FLUOROSCOPY TIME:   1 MINUTE 32 SECONDS 4 FLUOROSCOPIC IMAGES FINDINGS: Fluoroscopic guidance provided for surgical procedure  Osseous and soft tissue detail limited by technique  Impression: Fluoroscopic guidance provided for surgical procedure  Please refer to the separate procedure notes for additional details  Workstation performed: [id number]     Xr Hip/pelv 2-3 Vws Right If Performed    Result Date: 12/18/2019  Narrative: RIGHT HIP INDICATION:   f/u R ELIZABETH  COMPARISON:  None VIEWS:  XR HIP/PELV 2-3 VWS RIGHT W PELVIS IF PERFORMED FINDINGS: There has been a total right hip arthroplasty  The prosthetic components appear to be well seated on their respective bones and appear to articulate normally with one another  There is no evidence of acute fracture, dislocation or prosthesis loosening  No lytic or blastic lesions are seen  Soft tissues are unremarkable  Impression: Unremarkable appearance of the right hip following total joint replacement  Workstation performed: [id number]         Portions of the record may have been created with voice recognition software  Occasional wrong word or "sound a like" substitutions may have occurred due to the inherent limitations of voice recognition software  Read the chart carefully and recognize, using context, where substitutions have occurred

## 2019-12-28 NOTE — ASSESSMENT & PLAN NOTE
Patient is not on any calcium or vitamin-D supplementation despite diagnosis  · Follow-up vitamin-D level  · Will start calcium and vitamin-D supplementation

## 2019-12-28 NOTE — SOCIAL WORK
CM met w/ pt for STR choices  Discussed freedom of choice  Pt chooses 1) [facility] 2) [facility]  3) [facility]  A post acute care recommendation was made by your care team for [address]  Discussed Freedom of Choice with patient  List of facilities given to patient via in person  patient aware the list is custom filtered for them by preference  and that [facility] post acute providers are designated

## 2019-12-28 NOTE — ASSESSMENT & PLAN NOTE
Patient is on exam psych med regimen and has history of frequent ED presentations earlier this year due to requesting Ativan  · Lorazepam p r n  · Paroxetine daily  · Geodon b i d    · Seroquel at bedtime

## 2019-12-28 NOTE — PLAN OF CARE
Problem: Potential for Falls  Goal: Patient will remain free of falls  Description  INTERVENTIONS:  - Assess patient frequently for physical needs  -  Identify cognitive and physical deficits and behaviors that affect risk of falls    -  [place] fall precautions as indicated by assessment   - Educate patient/family on patient safety including physical limitations  - Instruct patient to call for assistance with activity based on assessment  - Modify environment to reduce risk of injury  - Consider OT/PT consult to assist with strengthening/mobility  Outcome: Progressing     Problem: Prexisting or High Potential for Compromised Skin Integrity  Goal: Skin integrity is maintained or improved  Description  INTERVENTIONS:  - Identify patients at risk for skin breakdown  - Assess and monitor skin integrity  - Assess and monitor nutrition and hydration status  - Monitor labs   - Assess for incontinence   - Turn and reposition patient  - Assist with mobility/ambulation  - Relieve pressure over bony prominences  - Avoid friction and shearing  - Provide appropriate hygiene as needed including keeping skin clean and dry  - Evaluate need for skin moisturizer/barrier cream  - Collaborate with interdisciplinary team   - Patient/family teaching  - Consider wound care consult   Outcome: Progressing     Problem: PAIN - ADULT  Goal: Verbalizes/displays adequate comfort level or baseline comfort level  Description  Interventions:  - Encourage patient to monitor pain and request assistance  - Assess pain using appropriate pain scale  - Administer analgesics based on type and severity of pain and evaluate response  - Implement non-pharmacological measures as appropriate and evaluate response  - Consider cultural and social influences on pain and pain management  - Notify physician/advanced practitioner if interventions unsuccessful or patient reports new pain  Outcome: Progressing     Problem: INFECTION - ADULT  Goal: Absence or prevention of progression during hospitalization  Description  INTERVENTIONS:  - Assess and monitor for signs and symptoms of infection  - Monitor lab/diagnostic results  - Monitor all insertion sites, i e  indwelling lines, tubes, and drains  - Monitor endotracheal if appropriate and nasal secretions for changes in amount and color  - [place] appropriate cooling/warming therapies per order  - Administer medications as ordered  - Instruct and encourage patient and family to use good hand hygiene technique  - Identify and instruct in appropriate isolation precautions for identified infection/condition  Outcome: Progressing  Goal: Absence of fever/infection during neutropenic period  Description  INTERVENTIONS:  - Monitor WBC    Outcome: Progressing     Problem: SAFETY ADULT  Goal: Maintain or return to baseline ADL function  Description  INTERVENTIONS:  -  Assess patient's ability to carry out ADLs; assess patient's baseline for ADL function and identify physical deficits which impact ability to perform ADLs (bathing, care of mouth/teeth, toileting, grooming, dressing, etc )  - Assess/evaluate cause of self-care deficits   - Assess range of motion  - Assess patient's mobility; develop plan if impaired  - Assess patient's need for assistive devices and provide as appropriate  - Encourage maximum independence but intervene and supervise when necessary  - Involve family in performance of ADLs  - Assess for home care needs following discharge   - Consider OT consult to assist with ADL evaluation and planning for discharge  - Provide patient education as appropriate  Outcome: Progressing  Goal: Maintain or return mobility status to optimal level  Description  INTERVENTIONS:  - Assess patient's baseline mobility status (ambulation, transfers, stairs, etc )    - Identify cognitive and physical deficits and behaviors that affect mobility  - Identify mobility aids required to assist with transfers and/or ambulation (gait belt, sit-to-stand, lift, walker, cane, etc )  - [place] fall precautions as indicated by assessment  - Record patient progress and toleration of activity level on Mobility SBAR; progress patient to next Phase/Stage  - Instruct patient to call for assistance with activity based on assessment  - Consider rehabilitation consult to assist with strengthening/weightbearing, etc   Outcome: Progressing     Problem: DISCHARGE PLANNING  Goal: Discharge to home or other facility with appropriate resources  Description  INTERVENTIONS:  - Identify barriers to discharge w/patient and caregiver  - Arrange for needed discharge resources and transportation as appropriate  - Identify discharge learning needs (meds, wound care, etc )  - Arrange for interpretive services to assist at discharge as needed  - Refer to Case Management Department for coordinating discharge planning if the patient needs post-hospital services based on physician/advanced practitioner order or complex needs related to functional status, cognitive ability, or social support system  Outcome: Progressing     Problem: Knowledge Deficit  Goal: Patient/family/caregiver demonstrates understanding of disease process, treatment plan, medications, and discharge instructions  Description  Complete learning assessment and assess knowledge base    Interventions:  - Provide teaching at level of understanding  - Provide teaching via preferred learning methods  Outcome: Progressing

## 2019-12-28 NOTE — SOCIAL WORK
Per Pt request referrals placed to [facility] and [facility]   unable to accept  Awaiting determination from TCU  CM met with Pt at bedside to discuss d/c plan  Pt requesting referral to [facility] TCU 1st choice  CM will follow

## 2019-12-28 NOTE — ASSESSMENT & PLAN NOTE
Evaluated by Orthopedic surgery on 12/24, no plan for repeat surgical intervention at this time    · Follow-up PT/OT evaluation  · Continue DVT prophylaxis

## 2019-12-28 NOTE — NURSING NOTE
Pt reporting 10/10 hip pain, but presents with lethargy and admits, "I'm very tired " Per bedside communication with Dr Ramya Childs at 80, he is concerned that the narcotics are making the pt excessively drowsy r/t delayed responses  Pt and RN agree to hold off on oxy at this time until pt is more awake  Will continue to monitor pain

## 2019-12-28 NOTE — ED NOTES
RN assumed care of pt at this time, pt resting comfortably in bed however reports pain with movement  Spoke to pt about plan of care, pt made RN aware she was unable to speak with case management due to her being on a bed pan  Pt states she would like to speak with case management  Dr Jose Wade made aware  RN assessment done at this time  Will continue to monitor        Felix Fowler RN  12/27/19 Bernard Lowe

## 2019-12-28 NOTE — ASSESSMENT & PLAN NOTE
Per Orthopedic surgery, no plan for surgical intervention at this time  Patient was requesting repeat imaging of her right hip despite having x-rays on 12/26, stating that she was unable to bear weight on it this morning and that something happened    Her right hip was mildly tender on exam   Patient states that she is unsafe to go home, cannot take care of herself, and is requesting rehab placement  · Will hold off on repeat imaging for now, however, if worsening ambulatory status or significantly worsening pain, will get repeat films  · Continue pain regimen with scheduled Tylenol, p r n   Oxycodone  · Gabapentin t i d   · Cold application to right hip  · PT/OT evaluation  · Case management consult for rehab placement

## 2019-12-29 PROCEDURE — 99232 SBSQ HOSP IP/OBS MODERATE 35: CPT | Performed by: NURSE PRACTITIONER

## 2019-12-29 RX ORDER — CALCIUM CARBONATE 200(500)MG
500 TABLET,CHEWABLE ORAL DAILY PRN
Status: DISCONTINUED | OUTPATIENT
Start: 2019-12-29 | End: 2020-01-14 | Stop reason: HOSPADM

## 2019-12-29 RX ORDER — MAGNESIUM HYDROXIDE/ALUMINUM HYDROXICE/SIMETHICONE 120; 1200; 1200 MG/30ML; MG/30ML; MG/30ML
30 SUSPENSION ORAL EVERY 4 HOURS PRN
Status: DISCONTINUED | OUTPATIENT
Start: 2019-12-29 | End: 2020-01-14 | Stop reason: HOSPADM

## 2019-12-29 RX ADMIN — CALCIUM 1 TABLET: 500 TABLET ORAL at 15:46

## 2019-12-29 RX ADMIN — ZIPRASIDONE HCL 80 MG: 40 CAPSULE ORAL at 17:29

## 2019-12-29 RX ADMIN — CALCIUM CARBONATE (ANTACID) CHEW TAB 500 MG 500 MG: 500 CHEW TAB at 13:21

## 2019-12-29 RX ADMIN — METHOCARBAMOL 750 MG: 750 TABLET, FILM COATED ORAL at 17:33

## 2019-12-29 RX ADMIN — ENOXAPARIN SODIUM 40 MG: 40 INJECTION SUBCUTANEOUS at 09:52

## 2019-12-29 RX ADMIN — OXYCODONE HYDROCHLORIDE AND ACETAMINOPHEN 500 MG: 500 TABLET ORAL at 17:28

## 2019-12-29 RX ADMIN — PAROXETINE 60 MG: 20 TABLET, FILM COATED ORAL at 09:51

## 2019-12-29 RX ADMIN — DOCUSATE SODIUM 100 MG: 100 CAPSULE, LIQUID FILLED ORAL at 17:28

## 2019-12-29 RX ADMIN — QUETIAPINE FUMARATE 400 MG: 100 TABLET ORAL at 21:28

## 2019-12-29 RX ADMIN — FERROUS SULFATE TAB 325 MG (65 MG ELEMENTAL FE) 325 MG: 325 (65 FE) TAB at 09:52

## 2019-12-29 RX ADMIN — METHOCARBAMOL 750 MG: 750 TABLET, FILM COATED ORAL at 06:18

## 2019-12-29 RX ADMIN — PANTOPRAZOLE SODIUM 40 MG: 20 TABLET, DELAYED RELEASE ORAL at 15:45

## 2019-12-29 RX ADMIN — LIDOCAINE 2 PATCH: 50 PATCH CUTANEOUS at 09:56

## 2019-12-29 RX ADMIN — ZIPRASIDONE HCL 80 MG: 40 CAPSULE ORAL at 09:53

## 2019-12-29 RX ADMIN — ACETAMINOPHEN 975 MG: 325 TABLET ORAL at 06:18

## 2019-12-29 RX ADMIN — ACETAMINOPHEN 975 MG: 325 TABLET ORAL at 13:31

## 2019-12-29 RX ADMIN — GABAPENTIN 100 MG: 100 CAPSULE ORAL at 09:51

## 2019-12-29 RX ADMIN — GABAPENTIN 100 MG: 100 CAPSULE ORAL at 21:28

## 2019-12-29 RX ADMIN — MELATONIN 2000 UNITS: at 09:50

## 2019-12-29 RX ADMIN — DOCUSATE SODIUM 100 MG: 100 CAPSULE, LIQUID FILLED ORAL at 09:51

## 2019-12-29 RX ADMIN — FOLIC ACID 1 MG: 1 TABLET ORAL at 09:51

## 2019-12-29 RX ADMIN — PANTOPRAZOLE SODIUM 40 MG: 20 TABLET, DELAYED RELEASE ORAL at 06:18

## 2019-12-29 RX ADMIN — METHOCARBAMOL 750 MG: 750 TABLET, FILM COATED ORAL at 13:20

## 2019-12-29 RX ADMIN — LEVOTHYROXINE SODIUM 25 MCG: 25 TABLET ORAL at 06:18

## 2019-12-29 RX ADMIN — GABAPENTIN 100 MG: 100 CAPSULE ORAL at 15:45

## 2019-12-29 RX ADMIN — CALCIUM 1 TABLET: 500 TABLET ORAL at 09:53

## 2019-12-29 RX ADMIN — OXYCODONE HYDROCHLORIDE AND ACETAMINOPHEN 500 MG: 500 TABLET ORAL at 09:53

## 2019-12-29 RX ADMIN — FERROUS SULFATE TAB 325 MG (65 MG ELEMENTAL FE) 325 MG: 325 (65 FE) TAB at 15:45

## 2019-12-29 RX ADMIN — ACETAMINOPHEN 975 MG: 325 TABLET ORAL at 21:28

## 2019-12-29 RX ADMIN — Medication 1 TABLET: at 09:51

## 2019-12-29 NOTE — ASSESSMENT & PLAN NOTE
· Per Orthopedic surgery, no plan for surgical intervention at this time   · - did just text and consult for official evaluation here with xray read and pt reporting 10 /10 pain   · Patient was seen in outpatient office status post fall at home (12/26/2019)  · - per LINDA Rico, imaging was done in the office, when she was noted to have a periprosthetic femur fracture in the right hip  Further evaluation noted hip prosthetic was in good position without appearance of displacement despite this fracture  · - recommendations on the 26th with that patient will continue to have pain because she has this fracture but it does not require operative intervention nor should  it to limit her rehab  Patient was to continue DVT prophylaxis to complete 4 weeks postop  · - and she was to follow up in 4 weeks for evaluation with x-rays  · Continue pain regimen with scheduled Tylenol, p r n  Oxycodone  · Gabapentin t i d   · PT/OT evaluation recommend rehab choices made and submitted   · Case management consult for rehab placement  · - upon arrival to er this admission patient underwent further imaging  · Imaging was done not yet read called radiology for read   · - now official xray read with: Oblique intertrochanteric fracture around the femoral stem of the patient's hip arthroplasty with mild apex- ventral angulation  · Posterior cortical irregularity at the proximal femur without discrete fracture demonstrated  Patient subsequently developed mildly angulated periprosthetic intertrochanteric fracture, though complete fracture is not demonstrated on the current study  no

## 2019-12-29 NOTE — UTILIZATION REVIEW
Continued Stay Review    Date: 12-29-19                        Current Patient Class: OBS Current Level of Care: M/S    HPI:59 y o  female initially admitted on 12/27 OBS with R hip pain s/p fx    Assessment/Plan: Pt reporting 10 /10 pain, Imaging was done not yet read called radiology for read   · - now official xray read with: Oblique intertrochanteric fracture around the femoral stem of the patient's hip arthroplasty with mild apex- ventral angulation  Posterior cortical irregularity at the proximal femur without discrete fracture demonstrated  Patient subsequently developed mildly angulated periprosthetic intertrochanteric fracture, though complete fracture is not demonstrated on the current study    Follow-up PT/OT need rehab     Pertinent Labs/Diagnostic Results:   Results from last 7 days   Lab Units 12/28/19  0435 12/27/19  2103   WBC Thousand/uL  --  5 22   HEMOGLOBIN g/dL  --  11 4*   HEMATOCRIT %  --  34 4*   PLATELETS Thousands/uL 419* 463*   NEUTROS ABS Thousands/µL  --  3 92     Results from last 7 days   Lab Units 12/28/19 0435 12/27/19  2103   SODIUM mmol/L 132* 132*   POTASSIUM mmol/L 3 5 3 2*   CHLORIDE mmol/L 97* 94*   CO2 mmol/L 32 33*   ANION GAP mmol/L 3* 5   BUN mg/dL 4* 4*   CREATININE mg/dL 0 47* 0 51*   EGFR ml/min/1 73sq m 108 106   CALCIUM mg/dL 9 5 9 1     Results from last 7 days   Lab Units 12/28/19 0435 12/27/19  2103   GLUCOSE RANDOM mg/dL 93 107           Vital Signs:   Date/Time  Temp  Pulse  Resp  BP  MAP (mmHg)  SpO2  O2 Device   12/29/19 15:20:26    87  19  140/83  102  97 %     12/29/19 0712  98 8 °F (37 1 °C)  102  16  120/78  92  97 %     12/28/19 22:13:21  99 2 °F (37 3 °C)  84    104/51  69  94 %     12/28/19 19:44:46    89    141/82  102  96 %     12/28/19 15:10:27  98 °F (36 7 °C)  88    97/59  72  94 %     12/28/19 07:06:15  98 7 °F (37 1 °C)  122Abnormal     133/108Abnormal   116  95 %           Medications:   Scheduled Medications:  Medications:  acetaminophen 975 mg Oral Q8H [facility]   ascorbic acid 500 mg Oral BID   calcium carbonate 1 tablet Oral BID With Meals   cholecalciferol 2,000 Units Oral Daily   docusate sodium 100 mg Oral BID   enoxaparin 40 mg Subcutaneous Daily   ferrous sulfate 325 mg Oral BID With Meals   folic acid 1 mg Oral Daily   gabapentin 100 mg Oral TID   levothyroxine 25 mcg Oral Early Morning   lidocaine 2 patch Topical Daily   methocarbamol 750 mg Oral Q6H [facility] & halfway   multivitamin-minerals 1 tablet Oral Daily   pantoprazole 40 mg Oral BID AC   PARoxetine 60 mg Oral QAM   QUEtiapine 400 mg Oral HS   ziprasidone 80 mg Oral BID     PRN Meds:  aluminum-magnesium hydroxide-simethicone 30 mL Oral Q4H PRN   calcium carbonate 500 mg Oral Daily PRN   LORazepam 2 mg Oral Q8H PRN x1   oxyCODONE 5 mg Oral Q4H PRN   oxyCODONE 10 mg Oral Q4H PRN x2       Discharge Plan: TBD    Network Utilization Review Department  [email or web address]  ATTENTION: Please call with any questions or concerns to [phone] and carefully listen to the prompts so that you are directed to the right person  All voicemails are confidential   Nani Medina all requests for admission clinical reviews, approved or denied determinations and any other requests to dedicated fax number below belonging to the campus where the patient is receiving treatment   List of dedicated fax numbers for the Facilities:  FACILITY NAME UR FAX NUMBER   ADMISSION DENIALS (Administrative/Medical Necessity) [phone]   [address] (Maternity/NICU/Pediatrics) [phone]     Dominic Schreiber [phone]   Abhi Thompson [phone]   [facility] [facility] [address] [facility] [address] [facility] [phone]

## 2019-12-29 NOTE — PHYSICIAN ADVISOR
Current patient class: Observation  The patient is currently on Hospital Day: 2 at [facility]        The patient was admitted to the hospital  on N/A at N/A for the following diagnosis:  Fall [W19  XXXA]  Right hip pain [M25 551]  Ambulatory dysfunction [R26 2]     After review of the relevant documentation, labs, vital signs and test results, the patient is most appropriate for OBSERVATION STATUS  Rationale is as follows:    Commercial observation case/ [employer]  The patient sustained a right hip fracture but will not require surgical intervention  She underwent recent right total hip arthroplasty but left against medical advice from the rehabilitation facility  She then had a mechanical fall at home leading to a new periprosthetic fracture  She was evaluated in the orthopedics office for this reason  Again, no surgical intervention was indicated  The recommendation was for rehabilitation  The patient presented to the hospital and required rehabilitation placement  She was therefore placed under observation class  Currently she is stable  She is not requiring any intravenous medication or intravenous fluids  She did have some drowsiness today possibly from narcotic medication  At this point I do not see indication for change to inpatient class  The focus will be on rehab placement      The patients vitals on arrival were ED Triage Vitals [12/27/19 1100]   Temperature Pulse Respirations Blood Pressure SpO2   98 3 °F (36 8 °C) 92 18 (!) 178/102 97 %      Temp Source Heart Rate Source Patient Position - Orthostatic VS BP Location FiO2 (%)   Oral Monitor Sitting Right arm --      Pain Score       Worst Possible Pain           Past Medical History:   Diagnosis Date    Anxiety     Back pain at L4-L5 level     Schreiber esophagus     Bulimia     Disease of thyroid gland     hypothyroid    GERD (gastroesophageal reflux disease)     Hyperlipidemia     Hypothyroidism     Leukopenia     Rheumatoid arthritis involving right hip (HCC)     Sciatica     Seizure ([address] )     due to medication mix up 8/2018 only one historically    Synovial cyst of lumbar spine     Vertigo     Weight gain      Past Surgical History:   Procedure Laterality Date    BONE MARROW BIOPSY      BREAST SURGERY      enlargement     COLONOSCOPY      [place] TOTAL HIP ARTHROPLASTY Right 12/11/2019    Procedure: ARTHROPLASTY HIP TOTAL ANTERIOR;  Surgeon: Yaneli Rod MD;  [facility]: [facility];  Service: Orthopedics    RHINOPLASTY             Consults have been placed to:   IP CONSULT TO CASE MANAGEMENT  IP CONSULT TO CASE MANAGEMENT    Vitals:    12/27/19 2215 12/27/19 2251 12/28/19 0706 12/28/19 1510   BP: 96/84 157/91 (!) 133/108 97/59   BP Location: Right arm      Pulse: 84 98 (!) 122 88   Resp: 18 18     Temp:  99 6 °F (37 6 °C) 98 7 °F (37 1 °C) 98 °F (36 7 °C)   TempSrc:       SpO2: 97% 98% 95% 94%   Weight:  71 2 kg (157 lb)     Height:  5' 3" (1 6 m)         Most recent labs:    Recent Labs     12/27/19  2103 12/28/19  0435   WBC 5 22  --    HGB 11 4*  --    HCT 34 4*  --    * 419*   K 3 2* 3 5   CALCIUM 9 1 9 5   BUN 4* 4*   CREATININE 0 51* 0 47*       Scheduled Meds:  Current Facility-Administered Medications:  acetaminophen 975 mg Oral Q8H [address] Omar D Verbambi, DO   ascorbic acid 500 mg Oral BID Ryanlm Ramos Verbambi, DO   calcium carbonate 1 tablet Oral BID With Meals Vanessa Dick, DO   cholecalciferol 2,000 Units Oral Daily Ryan Richard Veres, DO   docusate sodium 100 mg Oral BID Ryan Patron Veres, DO   enoxaparin 40 mg Subcutaneous Daily Ryanmarjan Flores, DO   ferrous sulfate 325 mg Oral BID With Meals Vanessa Dick, DO   folic acid 1 mg Oral Daily Ryan Patron Veres, DO   gabapentin 100 mg Oral TID Vanessa Dick, DO   levothyroxine 25 mcg Oral Early Morning Ryanlm Ramos Verbambi, DO   lidocaine 2 patch Topical Daily Tatiana Yip MD   LORazepam 2 mg Oral Q8H PRN Deyvi Hector, DO   methocarbamol 750 mg Oral Q6H [facility] Deyvi Hector, DO   multivitamin-minerals 1 tablet Oral Daily Deyvi Hector, DO   oxyCODONE 5 mg Oral Q4H PRN Deyvi Hector, DO   oxyCODONE 10 mg Oral Q4H PRN Deyvi Hector, DO   pantoprazole 40 mg Oral BID AC Ezra Flores, DO   PARoxetine 60 mg Oral QAM Ezra Flores, DO   QUEtiapine 400 mg Oral HS Ezra Flores, DO   ziprasidone 80 mg Oral BID Ezra Flores, DO     Continuous Infusions:   PRN Meds:  LORazepam    oxyCODONE    oxyCODONE

## 2019-12-29 NOTE — ASSESSMENT & PLAN NOTE
Evaluated by Orthopedic surgery on 12/24, no plan for repeat surgical intervention   · Follow-up PT/OT need rehab   · Continue DVT prophylaxis

## 2019-12-29 NOTE — PROGRESS NOTES
Progress Note - Rob Urbina 1960, 61 y o  female MRN: [id number]    Unit/Bed#: CW2 214-02 Encounter: [id number]    Primary Care Provider: Izetta Nageotte, MD   Date and time admitted to hospital: 12/27/2019 10:59 AM        * S/P right hip fracture  Assessment & Plan  · Per Orthopedic surgery, no plan for surgical intervention at this time   · - did just text and consult for official evaluation here with xray read and pt reporting 10 /10 pain   · Patient was seen in outpatient office status post fall at home (12/26/2019)  · - per LINDA Bruno, imaging was done in the office, when she was noted to have a periprosthetic femur fracture in the right hip  Further evaluation noted hip prosthetic was in good position without appearance of displacement despite this fracture  · - recommendations on the 26th with that patient will continue to have pain because she has this fracture but it does not require operative intervention nor should  it to limit her rehab  Patient was to continue DVT prophylaxis to complete 4 weeks postop  · - and she was to follow up in 4 weeks for evaluation with x-rays  · Continue pain regimen with scheduled Tylenol, p r n  Oxycodone  · Gabapentin t i d   · PT/OT evaluation recommend rehab choices made and submitted   · Case management consult for rehab placement  · - upon arrival to er this admission patient underwent further imaging  · Imaging was done not yet read called radiology for read   · - now official xray read with: Oblique intertrochanteric fracture around the femoral stem of the patient's hip arthroplasty with mild apex- ventral angulation  · Posterior cortical irregularity at the proximal femur without discrete fracture demonstrated  Patient subsequently developed mildly angulated periprosthetic intertrochanteric fracture, though complete fracture is not demonstrated on the current study      Status post right hip replacement  Assessment & Plan  Evaluated by Orthopedic surgery on 12/24, no plan for repeat surgical intervention   · Follow-up PT/OT need rehab   · Continue DVT prophylaxis    ABIMAEL (generalized anxiety disorder)  Assessment & Plan  Patient was reassured  · Lorazepam p r n  · Paroxetine daily  · Geodon b i d  · Seroquel at bedtime    Schizoaffective disorder, depressive type ([facility] [address] )  Assessment & Plan  Continue Seroquel at bedtime  Geodon b i d  Ativan p r n  Localized osteoporosis with current pathological fracture with routine healing  Assessment & Plan  · Vitamin-D supplementation  · Outpatient endocrine follow-up    Hyponatremia  Assessment & Plan  Monitor on psychiatric meds with pain all contribute 132    Iron deficiency anemia secondary to inadequate dietary iron intake  Assessment & Plan  Continue iron supplementation    Chronic bilateral low back pain without sciatica  Assessment & Plan  Continue Tylenol, oxycodone  Gabapentin t i d  Hypokalemia  Assessment & Plan  Replete  Monitor      Acquired hypothyroidism  Assessment & Plan  Continue daily levothyroxine    Pure hypercholesterolemia  Assessment & Plan  Monitor    Esophageal reflux  Assessment & Plan  Protonix b i d  Added tums /mylanta as pt with some upset stomach this am       VTE Pharmacologic Prophylaxis:   Pharmacologic: Enoxaparin (Lovenox)  Mechanical VTE Prophylaxis in Place: Yes    Patient Centered Rounds: I have performed bedside rounds with nursing staff today  Discussions with Specialists or Other Care Team Provider: nursing     Education and Discussions with Family / Patient: patient     Time Spent for Care: 45 minutes  More than 50% of total time spent on counseling and coordination of care as described above      Current Length of Stay: 0 day(s)    Current Patient Status: Observation   Certification Statement: The patient will continue to require additional inpatient hospital stay due to plan for rehab and pending xrays pt with ongoing pain 10/10 pain     Discharge Plan: will need rehab still pending workup and xray uncontrolled pain     Code Status: Level 1 - Full Code      Subjective:   Long discussion with patient  She reports that she has concerns and Orthopedics have not yet seen her and she feels that the pain is on the inner aspect of her leg where the fracture was reportedly on the outside when she saw them in the outpatient office  Patient reports that she left the facility AMA because she did not have any of her psychiatric medications and she needed to have them  She states they told her it would take a bit to get them sent from [place]  Patient is alert and oriented x3  Although monitor tone flat affect patient is expressing severe pain especially when bearing weight  She states that Orthopedics have informed her that she is able to bear weight on that leg  She is not understanding how she can bear weight on a fractured leg  Objective:     Vitals:   Temp (24hrs), Av 7 °F (37 1 °C), Min:98 °F (36 7 °C), Max:99 2 °F (37 3 °C)    Temp:  [98 °F (36 7 °C)-99 2 °F (37 3 °C)] 98 8 °F (37 1 °C)  HR:  [] 102  Resp:  [16] 16  BP: ()/(51-82) 120/78  SpO2:  [94 %-97 %] 97 %  Body mass index is 27 81 kg/m²  Input and Output Summary (last 24 hours): Intake/Output Summary (Last 24 hours) at 2019 1346  Last data filed at 2019 7957  Gross per 24 hour   Intake 1640 ml   Output 2940 ml   Net -1300 ml       Physical Exam:     Physical Exam   Constitutional: She is oriented to person, place, and time  No distress  HENT:   Head: Normocephalic  Mouth/Throat: No oropharyngeal exudate  Eyes: Conjunctivae are normal  Right eye exhibits no discharge  Left eye exhibits no discharge  No scleral icterus  Neck: No tracheal deviation present  No thyromegaly present  Cardiovascular: Normal rate  Exam reveals no gallop and no friction rub  No murmur heard  Pulmonary/Chest: No stridor  No respiratory distress  She has no wheezes   She has no rales  She exhibits no tenderness  Abdominal: She exhibits no distension and no mass  There is no tenderness  There is no rebound and no guarding  No hernia  Musculoskeletal: She exhibits tenderness (Right lateral thigh hip area) and deformity (Right lateral hip into groin Steri-Strips intact no erythema no edema slightly firm and tender around site  Patient with lidocaine patches attached  )  She exhibits no edema  Lymphadenopathy:     She has no cervical adenopathy  Neurological: She is oriented to person, place, and time  Skin: She is not diaphoretic  Psychiatric:   Flat monitor tone affect         Additional Data:     Labs:    Results from last 7 days   Lab Units 12/28/19  0435 12/27/19  2103   WBC Thousand/uL  --  5 22   HEMOGLOBIN g/dL  --  11 4*   HEMATOCRIT %  --  34 4*   PLATELETS Thousands/uL 419* 463*   NEUTROS PCT %  --  74   LYMPHS PCT %  --  12*   MONOS PCT %  --  12   EOS PCT %  --  0     Results from last 7 days   Lab Units 12/28/19  0435   SODIUM mmol/L 132*   POTASSIUM mmol/L 3 5   CHLORIDE mmol/L 97*   CO2 mmol/L 32   BUN mg/dL 4*   CREATININE mg/dL 0 47*   ANION GAP mmol/L 3*   CALCIUM mg/dL 9 5   GLUCOSE RANDOM mg/dL 93                           * I Have Reviewed All Lab Data Listed Above  * Additional Pertinent Lab Tests Reviewed:  All Labs Within Last 24 Hours Reviewed    Imaging:    Imaging Reports Reviewed Today Include: reviewed     Recent Cultures (last 7 days):           Last 24 Hours Medication List:     Current Facility-Administered Medications:  acetaminophen 975 mg Oral Q8H [facility] Omar Flores,    aluminum-magnesium hydroxide-simethicone 30 mL Oral Q4H PRN ABENA Tompkins   ascorbic acid 500 mg Oral BID Lucinda Glo Verbambi, DO   calcium carbonate 1 tablet Oral BID With Meals Lucinda Flores, DO   calcium carbonate 500 mg Oral Daily PRN ABENA Tompkins   cholecalciferol 2,000 Units Oral Daily Lucinda Flores,    docusate sodium 100 mg Oral BID Lucinda Quivers Mark, DO   enoxaparin 40 mg Subcutaneous Daily Chestine Netglenys Veres, DO   ferrous sulfate 325 mg Oral BID With Meals Cruz Braver, DO   folic acid 1 mg Oral Daily Chestine Netglenys Veres, DO   gabapentin 100 mg Oral TID Cruz Braver, DO   levothyroxine 25 mcg Oral Early Morning Cruz Braver, DO   lidocaine 2 patch Topical Daily Martinez Bryant MD   LORazepam 2 mg Oral Q8H PRN Domingo Braver, DO   methocarbamol 750 mg Oral Q6H [address] Cruz Braver, DO   multivitamin-minerals 1 tablet Oral Daily Chestine Netglenys Veres, DO   oxyCODONE 5 mg Oral Q4H PRN Domingo Braver, DO   oxyCODONE 10 mg Oral Q4H PRN Domingo Braver, DO   pantoprazole 40 mg Oral BID AC Chestine Netglenys Veres, DO   PARoxetine 60 mg Oral QAM Chestine Netglenys Flores, DO   QUEtiapine 400 mg Oral HS Chestine Netglenys Verbambi, DO   ziprasidone 80 mg Oral BID Domingo rCain, DO        Today, Patient Was Seen By: ABENA Mcelroy    ** Please Note: Dictation voice to text software may have been used in the creation of this document   **

## 2019-12-30 PROBLEM — S72.001A CLOSED RIGHT HIP FRACTURE (HCC): Status: ACTIVE | Noted: 2019-12-27

## 2019-12-30 PROCEDURE — 99232 SBSQ HOSP IP/OBS MODERATE 35: CPT | Performed by: NURSE PRACTITIONER

## 2019-12-30 PROCEDURE — 99233 SBSQ HOSP IP/OBS HIGH 50: CPT | Performed by: ORTHOPAEDIC SURGERY

## 2019-12-30 PROCEDURE — NC001 PR NO CHARGE: Performed by: ORTHOPAEDIC SURGERY

## 2019-12-30 RX ADMIN — LEVOTHYROXINE SODIUM 25 MCG: 25 TABLET ORAL at 06:37

## 2019-12-30 RX ADMIN — FERROUS SULFATE TAB 325 MG (65 MG ELEMENTAL FE) 325 MG: 325 (65 FE) TAB at 16:09

## 2019-12-30 RX ADMIN — GABAPENTIN 100 MG: 100 CAPSULE ORAL at 08:58

## 2019-12-30 RX ADMIN — OXYCODONE HYDROCHLORIDE 10 MG: 5 SOLUTION ORAL at 18:41

## 2019-12-30 RX ADMIN — CALCIUM 1 TABLET: 500 TABLET ORAL at 16:08

## 2019-12-30 RX ADMIN — GABAPENTIN 100 MG: 100 CAPSULE ORAL at 21:04

## 2019-12-30 RX ADMIN — Medication 1 TABLET: at 08:58

## 2019-12-30 RX ADMIN — DOCUSATE SODIUM 100 MG: 100 CAPSULE, LIQUID FILLED ORAL at 08:58

## 2019-12-30 RX ADMIN — OXYCODONE HYDROCHLORIDE AND ACETAMINOPHEN 500 MG: 500 TABLET ORAL at 08:58

## 2019-12-30 RX ADMIN — OXYCODONE HYDROCHLORIDE AND ACETAMINOPHEN 500 MG: 500 TABLET ORAL at 18:40

## 2019-12-30 RX ADMIN — LIDOCAINE 2 PATCH: 50 PATCH CUTANEOUS at 08:58

## 2019-12-30 RX ADMIN — ZIPRASIDONE HCL 80 MG: 40 CAPSULE ORAL at 08:58

## 2019-12-30 RX ADMIN — FOLIC ACID 1 MG: 1 TABLET ORAL at 08:58

## 2019-12-30 RX ADMIN — DOCUSATE SODIUM 100 MG: 100 CAPSULE, LIQUID FILLED ORAL at 18:41

## 2019-12-30 RX ADMIN — QUETIAPINE FUMARATE 400 MG: 100 TABLET ORAL at 21:05

## 2019-12-30 RX ADMIN — CALCIUM 1 TABLET: 500 TABLET ORAL at 06:37

## 2019-12-30 RX ADMIN — METHOCARBAMOL 750 MG: 750 TABLET, FILM COATED ORAL at 06:37

## 2019-12-30 RX ADMIN — GABAPENTIN 100 MG: 100 CAPSULE ORAL at 16:08

## 2019-12-30 RX ADMIN — PAROXETINE 60 MG: 20 TABLET, FILM COATED ORAL at 08:58

## 2019-12-30 RX ADMIN — METHOCARBAMOL 750 MG: 750 TABLET, FILM COATED ORAL at 18:40

## 2019-12-30 RX ADMIN — PANTOPRAZOLE SODIUM 40 MG: 20 TABLET, DELAYED RELEASE ORAL at 16:08

## 2019-12-30 RX ADMIN — METHOCARBAMOL 750 MG: 750 TABLET, FILM COATED ORAL at 11:01

## 2019-12-30 RX ADMIN — PANTOPRAZOLE SODIUM 40 MG: 20 TABLET, DELAYED RELEASE ORAL at 06:37

## 2019-12-30 RX ADMIN — FERROUS SULFATE TAB 325 MG (65 MG ELEMENTAL FE) 325 MG: 325 (65 FE) TAB at 06:37

## 2019-12-30 RX ADMIN — ACETAMINOPHEN 975 MG: 325 TABLET ORAL at 16:09

## 2019-12-30 RX ADMIN — ZIPRASIDONE HCL 80 MG: 40 CAPSULE ORAL at 18:40

## 2019-12-30 RX ADMIN — MELATONIN 2000 UNITS: at 08:58

## 2019-12-30 RX ADMIN — LORAZEPAM 2 MG: 1 TABLET ORAL at 08:58

## 2019-12-30 RX ADMIN — ACETAMINOPHEN 975 MG: 325 TABLET ORAL at 06:37

## 2019-12-30 NOTE — ASSESSMENT & PLAN NOTE
· Await orthopedic plan for surgical repair- no definitive plan devised at this time  · Patient was seen in outpatient office status post fall at home (12/26/2019)  · per LINDA Gar, imaging was done in the office, when she was noted to have a periprosthetic femur fracture in the right hip  Further evaluation noted hip prosthetic was in good position without appearance of displacement despite this fracture  ·  recommendations on the 26th- patient will continue to have pain because she has this fracture but it does not require operative intervention nor should limit ither rehab  Patient was to continue DVT prophylaxis to complete 4 weeks post fracture and she was to follow up in 4 weeks for evaluation with x-rays  · Appreciate IP evaluation and ongoing recommendations from ortho   · Continue pain regimen with scheduled Tylenol, p r n  Oxycodone  · Gabapentin t i d   · PT/OT evaluation recommend rehab choices made and submitted   · Case management consult for rehab placement  · Official xray read with: Oblique intertrochanteric fracture around the femoral stem of the patient's hip arthroplasty with mild apex- ventral angulation  Posterior cortical irregularity at the proximal femur without discrete fracture demonstrated

## 2019-12-30 NOTE — ASSESSMENT & PLAN NOTE
· Will monitor  · Psychiatric meds and pain will contribute to hyponatremia  · Sodium is stable today at 132

## 2019-12-30 NOTE — UTILIZATION REVIEW
Continued Stay Review  OBSERVATION 12/27/2019 @ 2144, CONVERTED TO INPATIENT ADMISSION 12/29/2019 @ 2030, DUE TO FURTHER DIAGNOSTIC WORKUP, REQUIRING AT LEAST 2 MIDNIGHT STAY  Date: 12/29/2019 12/29/19 2030  Inpatient Admission Once     Transfer Service: General Medicine       Question Answer Comment   Admitting Physician [phone], [facility]    Level of Care Med Surg    Estimated length of stay More than 2 Midnights    Certification I certify that inpatient services are medically necessary for this patient for a duration of greater than two midnights  See H&P and MD Progress Notes for additional information about the patient's course of treatment  The patient is a 63-year-old female who presented to the emergency room on 12/27/2019 with a chief complaint of right hip pain  Patient had a recent hip surgery on 12/11/2019 with a subsequent fall and finding of a periprosthetic intertrochanteric fracture  After initial orthopedic evaluation on an outpatient basis no surgical intervention was planned  Despite this patient had significant pain with rating of 10/10  She had been hospitalized under observation status since 12/27 2019  Her pain is still rated as 10/10  Inpatient orthopedic consultation is being sought at this time regarding further care of the hip fracture and pain  At this time inpatient level of care is appropriate  Current Patient Class: Inpatient  Current Level of Care: Med/Surg    HPI:59 y o  female initially admitted on 12/27/2019     Assessment/Plan: Per Orthopedic surgery, no plan for surgical intervention at this time  Continue pain regimen with scheduled Tylenol, p r n  Oxycodone  Gabapentin t i d  PT/OT evaluation  12/30/2019  Consult Ortho:  post mechanical fall with right oblique periprosthetic hip fracture  Non weight bearing right lower extremity  Analgesics for pain  DVT ppx    Patient will need operative fixation vs revision of the R hip periprosthetic fx  Timing of OR pending  Body mass index is 27 81 kg/m²  Pertinent Labs/Diagnostic Results:   Results from last 7 days   Lab Units 12/28/19 0435 12/27/19  2103   WBC Thousand/uL  --  5 22   HEMOGLOBIN g/dL  --  11 4*   HEMATOCRIT %  --  34 4*   PLATELETS Thousands/uL 419* 463*   NEUTROS ABS Thousands/µL  --  3 92     Results from last 7 days   Lab Units 12/28/19 0435 12/27/19  2103   SODIUM mmol/L 132* 132*   POTASSIUM mmol/L 3 5 3 2*   CHLORIDE mmol/L 97* 94*   CO2 mmol/L 32 33*   ANION GAP mmol/L 3* 5   BUN mg/dL 4* 4*   CREATININE mg/dL 0 47* 0 51*   EGFR ml/min/1 73sq m 108 106   CALCIUM mg/dL 9 5 9 1     Results from last 7 days   Lab Units 12/28/19 0435 12/27/19  2103   GLUCOSE RANDOM mg/dL 93 107     Vital Signs: /83   Pulse 104   Temp 98 8 °F (37 1 °C)   Resp 18   Ht 5' 3" (1 6 m)   Wt 71 2 kg (157 lb)   SpO2 99%   BMI 27 81 kg/m²     Medications:   Scheduled Medications:  Medications:  acetaminophen 975 mg Oral Q8H [address]   ascorbic acid 500 mg Oral BID   calcium carbonate 1 tablet Oral BID With Meals   cholecalciferol 2,000 Units Oral Daily   docusate sodium 100 mg Oral BID   enoxaparin 40 mg Subcutaneous Q24H YUMIKO   ferrous sulfate 325 mg Oral BID With Meals   folic acid 1 mg Oral Daily   gabapentin 100 mg Oral TID   levothyroxine 25 mcg Oral Early Morning   lidocaine 2 patch Topical Daily   methocarbamol 750 mg Oral Q6H [address]   multivitamin-minerals 1 tablet Oral Daily   pantoprazole 40 mg Oral BID AC   PARoxetine 60 mg Oral QAM   QUEtiapine 400 mg Oral HS   ziprasidone 80 mg Oral BID   Continuous IV Infusions:   PRN Meds:  aluminum-magnesium hydroxide-simethicone 30 mL Oral Q4H PRN   calcium carbonate 500 mg Oral Daily PRN   LORazepam 2 mg Oral Q8H PRN   oxyCODONE 5 mg Oral Q4H PRN   oxyCODONE 10 mg Oral Q4H PRN       Discharge Plan: TBD    Network Utilization Review Department  [email or web address]  ATTENTION: Please call with any questions or concerns to [phone] and carefully listen to the prompts so that you are directed to the right person  All voicemails are confidential   Tiki Hernandez all requests for admission clinical reviews, approved or denied determinations and any other requests to dedicated fax number below belonging to the campus where the patient is receiving treatment   List of dedicated fax numbers for the Facilities:  [address] DENIALS (Administrative/Medical Necessity) [phone]   [address] (Maternity/NICU/Pediatrics) [phone]   Kevin Massey [phone]   [facility] [phone]   Hebert Shea [phone]   [facility] [phone]   [address] [place] [address] 15[address] [facility] [phone]   [facility]  [phone]   [address] [facility]  [facility] [facility] 1000 [facility] [phone]

## 2019-12-30 NOTE — SOCIAL WORK
CM reviewed [facility] referral status:  [facility] does not accept pt's insurance and [address] is unable to accept pt  Hollis Anaya is reviewing pt's info  1:50pm [facility] does not have a female bed  CM reviewed SNF status with pt  Provided list of [place] contracted facilities and highlighted Dual Eligible Facilities  Explained that because pt is NWB status, she will likely go into her co-pay days at a SNF and will need to apply for [place] Medicaid  Pt is reviewing the list      4:00pm Pt chose [facility] and [address] [place] [address]  CM made referrals

## 2019-12-30 NOTE — PHYSICAL THERAPY NOTE
Physical Therapy Cancellation Note  PT orders received; Chart reviewed; Pt is currently pending sx intervention for leroy-prosthetic fx  PT to continue to follow and will re-attempt PT initial evaluation when medically appropriate       Ade GREENT, PT

## 2019-12-30 NOTE — PROGRESS NOTES
Progress Note - Amador Severe 1960, 61 y o  female MRN: [id number]    Unit/Bed#: CW2 214-02 Encounter: [id number]    Primary Care Provider: Maylon Lefort, MD   Date and time admitted to hospital: 12/27/2019 10:59 AM        * S/P right hip fracture  Assessment & Plan  · Await orthopedic plan for surgical repair- no definitive plan devised at this time  · Patient was seen in outpatient office status post fall at home (12/26/2019)  · per LINDA Rico, imaging was done in the office, when she was noted to have a periprosthetic femur fracture in the right hip  Further evaluation noted hip prosthetic was in good position without appearance of displacement despite this fracture  ·  recommendations on the 26th- patient will continue to have pain because she has this fracture but it does not require operative intervention nor should limit ither rehab  Patient was to continue DVT prophylaxis to complete 4 weeks post fracture and she was to follow up in 4 weeks for evaluation with x-rays  · Appreciate IP evaluation and ongoing recommendations from ortho   · Continue pain regimen with scheduled Tylenol, p r n  Oxycodone  · Gabapentin t i d   · PT/OT evaluation recommend rehab choices made and submitted   · Case management consult for rehab placement  · Official xray read with: Oblique intertrochanteric fracture around the femoral stem of the patient's hip arthroplasty with mild apex- ventral angulation  Posterior cortical irregularity at the proximal femur without discrete fracture demonstrated  Schizoaffective disorder, depressive type ([facility] [address] )  Assessment & Plan  · Continue Seroquel at bedtime  · Geodon b i d   · Ativan p r n  ABIMAEL (generalized anxiety disorder)  Assessment & Plan  Patient was reassured and stable from this standpoint   · Lorazepam p r n  · Paroxetine daily  · Geodon b i d    · Seroquel at bedtime    Hyponatremia  Assessment & Plan  · Will monitor  · Psychiatric meds and pain will contribute to hyponatremia  · Sodium is stable today at 132    Chronic bilateral low back pain without sciatica  Assessment & Plan  · Continue Tylenol, oxycodone  · Gabapentin t i d   · Can add heating pad if that will help  · Physical therapy as able    Iron deficiency anemia secondary to inadequate dietary iron intake  Assessment & Plan  · Continue iron supplementation      VTE Pharmacologic Prophylaxis:   Pharmacologic: Enoxaparin (Lovenox)  Mechanical VTE Prophylaxis in Place: Yes    Patient Centered Rounds: I have performed bedside rounds with nursing staff today  Discussions with Specialists or Other Care Team Provider:  Primary RN and case management    Education and Discussions with Family / Patient:  Patient    Time Spent for Care: 20 minutes  More than 50% of total time spent on counseling and coordination of care as described above  Current Length of Stay: 1 day(s)    Current Patient Status: Inpatient   Certification Statement: The patient will continue to require additional inpatient hospital stay due to Acute intractable pain secondary to right hip fracture around total hip prosthesis    Discharge Plan / Estimated Discharge Date:  Not medically stable for discharge today, deciding on OR fixation need      Code Status: Level 1 - Full Code      Subjective:   Complains of severe pain to her hip  Getting out of bed was quite a task    Pain medication is helping when she is given it  Offers no complaints of nausea or vomiting  No chest pain  No problems breathing  No fever chills  Tolerated her breakfast well  Would like more details about the possible surgery from the orthopedic team when they know the plan  Objective:     Vitals:   No data recorded  HR:  [] 104  Resp:  [17-19] 18  BP: ()/(58-83) 127/83  SpO2:  [96 %-99 %] 99 %  Body mass index is 27 81 kg/m²  Input and Output Summary (last 24 hours):        Intake/Output Summary (Last 24 hours) at 12/30/2019 1009  Last data filed at 12/30/2019 0824  Gross per 24 hour   Intake 1480 ml   Output 2500 ml   Net -1020 ml       Physical Exam:     Physical Exam   Constitutional: She is oriented to person, place, and time  She appears well-nourished  No distress  Sitting out of bed in the chair  HENT:   Head: Normocephalic and atraumatic  Eyes: Pupils are equal, round, and reactive to light  EOM are normal    Neck: Normal range of motion  Neck supple  Cardiovascular: Normal rate  Pulmonary/Chest: Effort normal and breath sounds normal    Abdominal: Soft  Musculoskeletal: Normal range of motion  She exhibits no edema  Neurological: She is alert and oriented to person, place, and time  Skin: Skin is warm and dry  Capillary refill takes less than 2 seconds  Psychiatric: She has a normal mood and affect  Her behavior is normal  Thought content normal          Additional Data:     Labs:    Results from last 7 days   Lab Units 12/28/19  0435 12/27/19  2103   WBC Thousand/uL  --  5 22   HEMOGLOBIN g/dL  --  11 4*   HEMATOCRIT %  --  34 4*   PLATELETS Thousands/uL 419* 463*   NEUTROS PCT %  --  74   LYMPHS PCT %  --  12*   MONOS PCT %  --  12   EOS PCT %  --  0     Results from last 7 days   Lab Units 12/28/19  0435   POTASSIUM mmol/L 3 5   CHLORIDE mmol/L 97*   CO2 mmol/L 32   BUN mg/dL 4*   CREATININE mg/dL 0 47*   CALCIUM mg/dL 9 5           * I Have Reviewed All Lab Data Listed Above  * Additional Pertinent Lab Tests Reviewed: All Labs Within Last 24 Hours Reviewed    Imaging:    Imaging Reports Reviewed Today Include:  X-ray of the right pelvis:Oblique intertrochanteric fracture around the femoral stem of the patient's hip arthroplasty with mild apex- ventral angulation          Recent Cultures (last 7 days):           Last 24 Hours Medication List:     Current Facility-Administered Medications:  acetaminophen 975 mg Oral Q8H [address] Omar Flores DO   aluminum-magnesium hydroxide-simethicone 30 mL Oral Q4H PRN Audrene Stamp ABENA Santillan   ascorbic acid 500 mg Oral BID Vevelyn Leas Veres, DO   calcium carbonate 1 tablet Oral BID With Meals [facility], DO   calcium carbonate 500 mg Oral Daily PRN ABENA Aburto   cholecalciferol 2,000 Units Oral Daily Vevelyn Leas Veres, DO   docusate sodium 100 mg Oral BID Vevelyn Leas Veres, DO   enoxaparin 40 mg Subcutaneous Q24H [facility] Domonique Camargo MD   ferrous sulfate 325 mg Oral BID With Meals [facility], DO   folic acid 1 mg Oral Daily Vevelyn Leas Veres, DO   gabapentin 100 mg Oral TID Lindbergh Mini, DO   levothyroxine 25 mcg Oral Early Morning Lindbergh Mini, DO   lidocaine 2 patch Topical Daily Claudetta Daunt, MD   LORazepam 2 mg Oral Q8H PRN Lindbergh Mini, DO   methocarbamol 750 mg Oral Q6H [facility], DO   multivitamin-minerals 1 tablet Oral Daily Vevelyn Leas Veres, DO   oxyCODONE 5 mg Oral Q4H PRN Lindbergh Mini, DO   oxyCODONE 10 mg Oral Q4H PRN Lindbergh Mini, DO   pantoprazole 40 mg Oral BID AC Vevelyn Leas Veres, DO   PARoxetine 60 mg Oral QAM Vevelyn Leas Veres, DO   QUEtiapine 400 mg Oral HS Vevelyn Leas Veres, DO   ziprasidone 80 mg Oral BID [facility], DO        Today, Patient Was Seen By: ABENA Seo    ** Please Note: Dragon 360 Dictation voice to text software may have been used in the creation of this document   **

## 2019-12-30 NOTE — ASSESSMENT & PLAN NOTE
Patient was reassured and stable from this standpoint   · Lorazepam p r n  · Paroxetine daily  · Geodon b i d    · Seroquel at bedtime

## 2019-12-30 NOTE — OCCUPATIONAL THERAPY NOTE
Occupational Therapy         Patient Name: Brooks ISSA Date: 12/30/2019    Per current ortho note - pt now NWB and pending further sx intervention for leroy-prosthetic fx - will defer OT intervention at this time and await post op orders with [facility]    Ekta Arceo OT

## 2019-12-30 NOTE — ASSESSMENT & PLAN NOTE
· Continue Tylenol, oxycodone  · Gabapentin t i d   · Can add heating pad if that will help  · Physical therapy as able

## 2019-12-30 NOTE — PROGRESS NOTES
Progress Note - Orthopedics   Echo Greer 61 y o  female MRN: [id number]  Unit/Bed#: CW2 214-02      Subjective:    Resting comfortably this am, pain controlled at rest      Labs:  0   Lab Value Date/Time    HCT 34 4 (L) 12/27/2019 2103    HCT 27 2 (L) 12/16/2019 0511    HCT 25 7 (L) 12/15/2019 0514    HGB 11 4 (L) 12/27/2019 2103    HGB 8 8 (L) 12/16/2019 0511    HGB 8 4 (L) 12/15/2019 0514    INR 0 99 12/04/2019 1004    WBC 5 22 12/27/2019 2103    WBC 4 81 12/16/2019 0511    WBC 4 86 12/15/2019 0514    CRP 4 8 (H) 12/04/2019 1004       Meds:    Current Facility-Administered Medications:     acetaminophen (TYLENOL) tablet 975 mg, 975 mg, Oral, Q8H [address], Itzel Ebbing Veres, DO, 975 mg at 12/30/19 2144    aluminum-magnesium hydroxide-simethicone (MYLANTA) 200-200-20 mg/5 mL oral suspension 30 mL, 30 mL, Oral, Q4H PRN, Darra Spore, CRNP    ascorbic acid (VITAMIN C) tablet 500 mg, 500 mg, Oral, BID, Itzel Ebbing Veres, DO, 500 mg at 12/29/19 1728    calcium carbonate (OYSTER SHELL,OSCAL) 500 mg tablet 1 tablet, 1 tablet, Oral, BID With Meals, Stevphen Shawl, DO, 1 tablet at 12/30/19 2489    calcium carbonate (TUMS) chewable tablet 500 mg, 500 mg, Oral, Daily PRN, Darra Spore, CRNP, 500 mg at 12/29/19 1321    cholecalciferol (VITAMIN D3) tablet 2,000 Units, 2,000 Units, Oral, Daily, Stevphen Shawl, DO, 2,000 Units at 12/29/19 0950    docusate sodium (COLACE) capsule 100 mg, 100 mg, Oral, BID, Itzel Ebbing Veres, DO, 100 mg at 12/29/19 1728    ferrous sulfate tablet 325 mg, 325 mg, Oral, BID With Meals, Stevphen Shawl, DO, 325 mg at 44/43/63 2257    folic acid (FOLVITE) tablet 1 mg, 1 mg, Oral, Daily, Itzel Ebbing Veres, DO, 1 mg at 12/29/19 9295    gabapentin (NEURONTIN) capsule 100 mg, 100 mg, Oral, TID, Itzel Ebbing Veres, DO, 100 mg at 12/29/19 2128    levothyroxine tablet 25 mcg, 25 mcg, Oral, Early Morning, Itzel Ebbing Veres, DO, 25 mcg at 12/30/19 0637    lidocaine (LIDODERM) 5 % patch 2 patch, 2 patch, Topical, Daily, Martinez Bryant MD, 2 patch at 12/29/19 0956    LORazepam (ATIVAN) tablet 2 mg, 2 mg, Oral, Q8H PRN, Chestine Nettle Veres, DO, 2 mg at 12/28/19 0519    methocarbamol (ROBAXIN) tablet 750 mg, 750 mg, Oral, Q6H [address], Chestine Nettle Veres, DO, 750 mg at 12/30/19 7580    multivitamin-minerals (CENTRUM) tablet 1 tablet, 1 tablet, Oral, Daily, Domingo Braver, DO, 1 tablet at 12/29/19 0951    oxyCODONE (ROXICODONE) IR tablet 5 mg, 5 mg, Oral, Q4H PRN, Domingo Braver, DO    oxyCODONE (ROXICODONE) oral solution 10 mg, 10 mg, Oral, Q4H PRN, Chestine Nettle Veres, DO, 10 mg at 12/28/19 0735    pantoprazole (PROTONIX) EC tablet 40 mg, 40 mg, Oral, BID AC, Chestine Nettle Veres, DO, 40 mg at 12/30/19 0990    PARoxetine (PAXIL) tablet 60 mg, 60 mg, Oral, QAM, Chestine Nettle Veres, DO, 60 mg at 12/29/19 8895    QUEtiapine (SEROquel) tablet 400 mg, 400 mg, Oral, HS, Chestine Nettle Veres, DO, 400 mg at 12/29/19 2128    ziprasidone (GEODON) capsule 80 mg, 80 mg, Oral, BID, Chestine Nettle Veres, DO, 80 mg at 12/29/19 1729    Blood Culture:   Lab Results   Component Value Date    BLOODCX No Growth After 5 Days  08/13/2018    BLOODCX Staphylococcus coagulase negative (A) 08/13/2018       Wound Culture:   No results found for: WOUNDCULT    Ins and Outs:  I/O last 24 hours: In: 1440 [P O :1440]  Out: 3105 [Urine:2590]          Physical:  Vitals:    12/30/19 0747   BP: 127/83   Pulse: 104   Resp: 18   Temp:    SpO2: 99%     Musculoskeletal: right Lower Extremity  · Skin intact  · TTP over anterior and lateral hip/thigh  · SILT s/s/sp/dp/t  +fhl/ehl, +ankle dorsi/plantar flexion  2+ DP pulse    Assessment:    61 y  o female with right periprosthetic femur fx, pending revision operative fixation     Plan:  · NWB RLE  · Pain control  · DVT ppx  · Dispo: Ortho will follow   · Definitive plan for operative fixation pending    Patrice Melendez MD

## 2019-12-30 NOTE — CONSULTS
Orthopedics   Bety Burrows 61 y o  female MRN: [id number]  Unit/Bed#: OZ8 214-02      Chief Complaint:   right hip pain    HPI:   61 y o  female well known to our service status post a mechanical fall 1 week ago, she has a R ELIZABETH done 18 days ago  She is complaining of right hip pain and inability to bear weight  Pain is well localized to the hip and is made worse with motion or contact to the area  Denies numbness or tingling  Denies fevers, chills, SOB, calf pain  Patient was [facility] to [facility] on 12/20  On 12/21 she left AMA and later that day had a fall       Review Of Systems:   · Skin: incision well healed, no drainage  · Neuro: See HPI  · Musculoskeletal: See HPI  · 14 point review of systems negative except as stated above     Past Medical History:   Past Medical History:   Diagnosis Date    Anxiety     Back pain at L4-L5 level     Schreiber esophagus     Bulimia     Disease of thyroid gland     hypothyroid    GERD (gastroesophageal reflux disease)     Hyperlipidemia     Hypothyroidism     Leukopenia     Rheumatoid arthritis involving right hip (HCC)     Sciatica     Seizure (HCC)     due to medication mix up 8/2018 only one historically    Synovial cyst of lumbar spine     Vertigo     Weight gain        Past Surgical History:   Past Surgical History:   Procedure Laterality Date    BONE MARROW BIOPSY      BREAST SURGERY      enlargement     COLONOSCOPY      HI TOTAL HIP ARTHROPLASTY Right 12/11/2019    Procedure: ARTHROPLASTY HIP TOTAL ANTERIOR;  Surgeon: Duane Henri, MD;  [facility];  Service: Orthopedics    RHINOPLASTY         Family History:  Family history reviewed and non-contributory  Family History   Problem Relation Age of Onset    Uterine cancer Mother     Esophageal cancer Father     Colon cancer Maternal Grandmother        Social History:  Social History     Socioeconomic History    Marital status: Single     Spouse name: None    Number of children: None    Years of education: None    Highest education level: None   Occupational History    None   Social Needs    Financial resource strain: None    Food insecurity:     Worry: None     Inability: None    Transportation needs:     Medical: None     Non-medical: None   Tobacco Use    Smoking status: Never Smoker    Smokeless tobacco: Never Used   Substance and Sexual Activity    Alcohol use: Never     Frequency: Never     Binge frequency: Never    Drug use: No    Sexual activity: Not Currently   Lifestyle    Physical activity:     Days per week: None     Minutes per session: None    Stress: To some extent   Relationships    Social connections:     Talks on phone: None     Gets together: None     Attends Restoration service: None     Active member of club or organization: None     Attends meetings of clubs or organizations: None     Relationship status: None    Intimate partner violence:     Fear of current or ex partner: None     Emotionally abused: None     Physically abused: None     Forced sexual activity: None   Other Topics Concern    None   Social History Narrative    Family disruption death of family member parent, per allscripts       Allergies:    Allergies   Allergen Reactions    Risperdal [Risperidone] Shortness Of Breath     Rapid heart beat, SOB    Zyprexa [Olanzapine] Shortness Of Breath     Rapid heartbeat    Latex Rash     Band aids, adhesives wears normal underwear, can eat bananas  USE PAPER TAPE           Labs:  0   Lab Value Date/Time    HCT 34 4 (L) 12/27/2019 2103    HCT 27 2 (L) 12/16/2019 0511    HCT 25 7 (L) 12/15/2019 0514    HGB 11 4 (L) 12/27/2019 2103    HGB 8 8 (L) 12/16/2019 0511    HGB 8 4 (L) 12/15/2019 0514    INR 0 99 12/04/2019 1004    WBC 5 22 12/27/2019 2103    WBC 4 81 12/16/2019 0511    WBC 4 86 12/15/2019 0514    CRP 4 8 (H) 12/04/2019 1004       Meds:    Current Facility-Administered Medications:     acetaminophen (TYLENOL) tablet 975 mg, 975 mg, Oral, Q8H [address], Corinn Check, DO, 975 mg at 12/29/19 2128    aluminum-magnesium hydroxide-simethicone (MYLANTA) 200-200-20 mg/5 mL oral suspension 30 mL, 30 mL, Oral, Q4H PRN, Lanney Diones, CRNP    ascorbic acid (VITAMIN C) tablet 500 mg, 500 mg, Oral, BID, Lamin Raimundo Veres, DO, 500 mg at 12/29/19 1728    calcium carbonate (OYSTER SHELL,OSCAL) 500 mg tablet 1 tablet, 1 tablet, Oral, BID With Meals, Corinn Check, DO, 1 tablet at 12/29/19 1546    calcium carbonate (TUMS) chewable tablet 500 mg, 500 mg, Oral, Daily PRN, Lanney Diodenys, CRNP, 500 mg at 12/29/19 1321    cholecalciferol (VITAMIN D3) tablet 2,000 Units, 2,000 Units, Oral, Daily, Corinn Check, DO, 2,000 Units at 12/29/19 0950    docusate sodium (COLACE) capsule 100 mg, 100 mg, Oral, BID, Lamin Raimundo Veres, DO, 100 mg at 12/29/19 1728    enoxaparin (LOVENOX) subcutaneous injection 40 mg, 40 mg, Subcutaneous, Daily, Lamin Raimundo Veres, DO, 40 mg at 12/29/19 6758    ferrous sulfate tablet 325 mg, 325 mg, Oral, BID With Meals, Corinn Check, DO, 325 mg at 07/64/10 6463    folic acid (FOLVITE) tablet 1 mg, 1 mg, Oral, Daily, Lamin Raimundo Veres, DO, 1 mg at 12/29/19 6247    gabapentin (NEURONTIN) capsule 100 mg, 100 mg, Oral, TID, Lamin Raimundo Veres, DO, 100 mg at 12/29/19 2128    levothyroxine tablet 25 mcg, 25 mcg, Oral, Early Morning, Lamin Raimundo Veres, DO, 25 mcg at 12/29/19 0618    lidocaine (LIDODERM) 5 % patch 2 patch, 2 patch, Topical, Daily, Caro Evans MD, 2 patch at 12/29/19 0956    LORazepam (ATIVAN) tablet 2 mg, 2 mg, Oral, Q8H PRN, Corinn Check, DO, 2 mg at 12/28/19 0920    methocarbamol (ROBAXIN) tablet 750 mg, 750 mg, Oral, Q6H [address], Lamin Raimundo Veres, DO, 750 mg at 12/29/19 1733    multivitamin-minerals (CENTRUM) tablet 1 tablet, 1 tablet, Oral, Daily, Lamin Raimundo Veres, DO, 1 tablet at 12/29/19 0951    oxyCODONE (ROXICODONE) IR tablet 5 mg, 5 mg, Oral, Q4H PRN, Corinn Check, DO    oxyCODONE (Zollie Peper) oral solution 10 mg, 10 mg, Oral, Q4H PRN, Chestine Nettle Veres, DO, 10 mg at 12/28/19 0735    pantoprazole (PROTONIX) EC tablet 40 mg, 40 mg, Oral, BID AC, Omar D Veres, DO, 40 mg at 12/29/19 1545    PARoxetine (PAXIL) tablet 60 mg, 60 mg, Oral, QAM, Chestine Nettle Veres, DO, 60 mg at 12/29/19 0951    QUEtiapine (SEROquel) tablet 400 mg, 400 mg, Oral, HS, Chestine Nettle Veres, DO, 400 mg at 12/29/19 2128    ziprasidone (GEODON) capsule 80 mg, 80 mg, Oral, BID, Chestine Nettle Veres, DO, 80 mg at 12/29/19 1729    Blood Culture:   Lab Results   Component Value Date    BLOODCX No Growth After 5 Days  08/13/2018    BLOODCX Staphylococcus coagulase negative (A) 08/13/2018       Wound Culture:   No results found for: WOUNDCULT    Ins and Outs:  I/O last 24 hours: In: 1320 [P O :1320]  Out: 3090 [Urine:3090]          Physical Exam:   /83   Pulse 87   Temp 98 8 °F (37 1 °C)   Resp 19   Ht 5' 3" (1 6 m)   Wt 71 2 kg (157 lb)   SpO2 97%   BMI 27 81 kg/m²   Gen: Alert and oriented to person, place, time  HEENT: EOMI, eyes clear, moist mucus membranes, hearing intact  Respiratory: Bilateral chest rise  No audible wheezing found  Cardiovascular: Regular Rate and Rhythm  Abdomen: soft nontender/nondistended  Musculoskeletal: right lower extremity  · Incision is well healed with steri strips, c/d/i  · Tender to palpation over hip  · Pain with internal/external rotation of the hip  · Sensation intact L3-S1  · Intact ankle dorsi/plantar flexion, EHL/FHL  · 2+ DP pulse    Radiology:   I personally reviewed the films  X-rays right hip shows oblique periprosthetic hip fracture around the stem     _*_*_*_*_*_*_*_*_*_*_*_*_*_*_*_*_*_*_*_*_*_*_*_*_*_*_*_*_*_*_*_*_*_*_*_*_*_*_*_*_*    Assessment:  61 y  o female status post mechanical fall with right oblique periprosthetic hip fracture       Plan:   · Non weight bearing right lower extremity  · Analgesics for pain  · DVT ppx  · Patient will need operative fixation vs revision of the R hip periprosthetic fx  Timing of OR pending  · Body mass index is 27 81 kg/m²    · Dispo: Ortho will follow    Kayleen Wyatt MD

## 2019-12-30 NOTE — UTILIZATION REVIEW
Notification of Inpatient Admission/Inpatient Authorization Request   This is a Notification of Inpatient Admission for [address]  Be advised that this patient was admitted to our facility under Inpatient Status  Contact Ginny Felix at [phone] for additional admission information  Bruno Triana  DEPT  DEDICATED [phone]  Patient Name:   Estevan Martinez   YOB: 1960       [address]   [address]:   [place] [address]  Tax ID: [id number]  NPI: [id number] Attending Provider/NPI: Anne-Marie Taylor Md [[id number]]      Place of Service Code: 24     [facility]   Start Date: 12/29/19 2030     Discharge Date & Time: No discharge date for patient encounter  Type of Admission: Inpatient Status Discharge Disposition (if discharged): Non [facility] SNF/TCU/SNU   Patient Diagnoses: Injury, unspecified, initial encounter Jean Marie Fowler  Right hip pain [M25 551]  Ambulatory dysfunction [R26 2]     Orders: Admission Orders (From admission, onward)     Ordered        12/29/19 2030  Inpatient Admission  Once         12/27/19 2144  Place in Observation  Once                    Assigned Utilization Review Contact: Ginny Felix  Utilization   Network Utilization Review Department  Phone: [phone]; Fax [phone]  Email: [email or web address]   ATTENTION PAYERS: Please call the assigned Utilization  directly with any questions or concerns ALL voicemails in the department are confidential  Send all requests for admission clinical reviews, approved or denied determinations and any other requests to dedicated fax number belonging to the campus where the patient is receiving treatment

## 2019-12-30 NOTE — PHYSICIAN ADVISOR
Current patient class: Observation  The patient is currently on Hospital Day: 3 at [address] [place] [address]      This patient was originally admitted to the hospital under observation status  After admission the patient was reevaluated and determined to require further hospitalization  After review of the relevant documentation, labs, vital signs and test results, the patient is appropriate for INPATIENT ADMISSION  Admission to the hospital as an inpatient is a complex decision making process which requires the practitioner to consider the patients presenting complaint, history and physical examination and all relevant testing  With this in mind, in this case, the patient was deemed appropriate for INPATIENT ADMISSION  After review of the documentation and testing available at the time of the admission I concur with this clinical determination of medical necessity  Rationale is as follows: The patient is a 63-year-old female who presented to the emergency room on 12/27/2019 with a chief complaint of right hip pain  Patient had a recent hip surgery on 12/11/2019 with a subsequent fall and finding of a periprosthetic intertrochanteric fracture  After initial orthopedic evaluation on an outpatient basis no surgical intervention was planned  Despite this patient had significant pain with rating of 10/10  She had been hospitalized under observation status since 12/27 2019  Her pain is still rated as 10/10  Inpatient orthopedic consultation is being sought at this time regarding further care of the hip fracture and pain  At this time inpatient level of care is appropriate      The patients vitals on arrival were ED Triage Vitals [12/27/19 1100]   Temperature Pulse Respirations Blood Pressure SpO2   98 3 °F (36 8 °C) 92 18 (!) 178/102 97 %      Temp Source Heart Rate Source Patient Position - Orthostatic VS BP Location FiO2 (%)   Oral Monitor Sitting Right arm --      Pain Score       Worst Possible Pain           Past Medical History:   Diagnosis Date    Anxiety     Back pain at L4-L5 level     Schreiber esophagus     Bulimia     Disease of thyroid gland     hypothyroid    GERD (gastroesophageal reflux disease)     Hyperlipidemia     Hypothyroidism     Leukopenia     Rheumatoid arthritis involving right hip (HCC)     Sciatica     Seizure ([address] )     due to medication mix up 8/2018 only one historically    Synovial cyst of lumbar spine     Vertigo     Weight gain      Past Surgical History:   Procedure Laterality Date    BONE MARROW BIOPSY      BREAST SURGERY      enlargement     COLONOSCOPY      CO TOTAL HIP ARTHROPLASTY Right 12/11/2019    Procedure: ARTHROPLASTY HIP TOTAL ANTERIOR;  Surgeon: Florentin Kelly MD;  [facility];  Service: Orthopedics    RHINOPLASTY         The patient was admitted to the hospital at N/A on N/A for the following diagnosis:  Fall [W19  XXXA]  Right hip pain [M25 551]  Ambulatory dysfunction [R26 2]    Consults have been placed to:   IP CONSULT TO CASE MANAGEMENT  IP CONSULT TO CASE MANAGEMENT  IP CONSULT TO ORTHOPEDIC SURGERY    Vitals:    12/28/19 1944 12/28/19 2213 12/29/19 0712 12/29/19 1520   BP: 141/82 104/51 120/78 140/83   Pulse: 89 84 102 87   Resp:   16 19   Temp:  99 2 °F (37 3 °C) 98 8 °F (37 1 °C)    TempSrc:       SpO2: 96% 94% 97% 97%   Weight:       Height:           Most recent labs:    Recent Labs     12/27/19  2103 12/28/19  0435   WBC 5 22  --    HGB 11 4*  --    HCT 34 4*  --    * 419*   K 3 2* 3 5   CALCIUM 9 1 9 5   BUN 4* 4*   CREATININE 0 51* 0 47*       Scheduled Meds:  Current Facility-Administered Medications:  acetaminophen 975 mg Oral Q8H [facility] Omar Flores, DO   aluminum-magnesium hydroxide-simethicone 30 mL Oral Q4H PRN Andria Beans, CRNP   ascorbic acid 500 mg Oral BID Paulita Rinne Veres, DO   calcium carbonate 1 tablet Oral BID With Meals Lucero Flores, DO   calcium carbonate 500 mg Oral Daily PRN ABENA Guerrero   cholecalciferol 2,000 Units Oral Daily Clement Terry Flores, DO   docusate sodium 100 mg Oral BID Clement Terry Flores, DO   enoxaparin 40 mg Subcutaneous Daily Clepinky Flores, DO   ferrous sulfate 325 mg Oral BID With Meals Krystin Font, DO   folic acid 1 mg Oral Daily Clement Terry Flores, DO   gabapentin 100 mg Oral TID Krystin Font, DO   levothyroxine 25 mcg Oral Early Morning Krystin Font, DO   lidocaine 2 patch Topical Daily Naun Díaz MD   LORazepam 2 mg Oral Q8H PRN Krystin Font, DO   methocarbamol 750 mg Oral Q6H [address] Krystin Font, DO   multivitamin-minerals 1 tablet Oral Daily Clepinky Flores, DO   oxyCODONE 5 mg Oral Q4H PRN Krystin Font, DO   oxyCODONE 10 mg Oral Q4H PRN Krystin Font, DO   pantoprazole 40 mg Oral BID AC Watson Flores, DO   PARoxetine 60 mg Oral QAM Watson Flores, DO   QUEtiapine 400 mg Oral HS Clepinky Flores, DO   ziprasidone 80 mg Oral BID Clepinky Flores, DO     Continuous Infusions:   PRN Meds: aluminum-magnesium hydroxide-simethicone    calcium carbonate    LORazepam    oxyCODONE    oxyCODONE    Surgical procedures (if appropriate):

## 2019-12-31 ENCOUNTER — APPOINTMENT (INPATIENT)
Dept: RADIOLOGY | Facility: HOSPITAL | Age: 59
DRG: 466 | End: 2019-12-31
Payer: COMMERCIAL

## 2019-12-31 ENCOUNTER — PATIENT OUTREACH (OUTPATIENT)
Dept: INTERNAL MEDICINE CLINIC | Facility: CLINIC | Age: 59
End: 2019-12-31

## 2019-12-31 PROCEDURE — NS001 PR NO SIGNATURE OR ATTESTATION: Performed by: ORTHOPAEDIC SURGERY

## 2019-12-31 PROCEDURE — 99232 SBSQ HOSP IP/OBS MODERATE 35: CPT | Performed by: INTERNAL MEDICINE

## 2019-12-31 PROCEDURE — 73700 CT LOWER EXTREMITY W/O DYE: CPT

## 2019-12-31 RX ORDER — HYDROMORPHONE HCL/PF 1 MG/ML
0.2 SYRINGE (ML) INJECTION EVERY 4 HOURS PRN
Status: DISCONTINUED | OUTPATIENT
Start: 2019-12-31 | End: 2020-01-04

## 2019-12-31 RX ADMIN — ENOXAPARIN SODIUM 40 MG: 40 INJECTION SUBCUTANEOUS at 09:05

## 2019-12-31 RX ADMIN — ZIPRASIDONE HCL 80 MG: 40 CAPSULE ORAL at 09:05

## 2019-12-31 RX ADMIN — ACETAMINOPHEN 975 MG: 325 TABLET ORAL at 15:00

## 2019-12-31 RX ADMIN — CALCIUM 1 TABLET: 500 TABLET ORAL at 06:30

## 2019-12-31 RX ADMIN — LIDOCAINE 2 PATCH: 50 PATCH CUTANEOUS at 09:04

## 2019-12-31 RX ADMIN — ACETAMINOPHEN 975 MG: 325 TABLET ORAL at 05:11

## 2019-12-31 RX ADMIN — Medication 1 TABLET: at 09:05

## 2019-12-31 RX ADMIN — LORAZEPAM 2 MG: 1 TABLET ORAL at 09:06

## 2019-12-31 RX ADMIN — METHOCARBAMOL 750 MG: 750 TABLET, FILM COATED ORAL at 23:27

## 2019-12-31 RX ADMIN — OXYCODONE HYDROCHLORIDE 5 MG: 5 TABLET ORAL at 05:11

## 2019-12-31 RX ADMIN — PANTOPRAZOLE SODIUM 40 MG: 20 TABLET, DELAYED RELEASE ORAL at 05:11

## 2019-12-31 RX ADMIN — MELATONIN 2000 UNITS: at 09:05

## 2019-12-31 RX ADMIN — GABAPENTIN 100 MG: 100 CAPSULE ORAL at 17:03

## 2019-12-31 RX ADMIN — DOCUSATE SODIUM 100 MG: 100 CAPSULE, LIQUID FILLED ORAL at 17:04

## 2019-12-31 RX ADMIN — FERROUS SULFATE TAB 325 MG (65 MG ELEMENTAL FE) 325 MG: 325 (65 FE) TAB at 06:30

## 2019-12-31 RX ADMIN — PAROXETINE 60 MG: 20 TABLET, FILM COATED ORAL at 09:06

## 2019-12-31 RX ADMIN — PANTOPRAZOLE SODIUM 40 MG: 20 TABLET, DELAYED RELEASE ORAL at 17:04

## 2019-12-31 RX ADMIN — OXYCODONE HYDROCHLORIDE 10 MG: 5 SOLUTION ORAL at 09:05

## 2019-12-31 RX ADMIN — METHOCARBAMOL 750 MG: 750 TABLET, FILM COATED ORAL at 00:08

## 2019-12-31 RX ADMIN — QUETIAPINE FUMARATE 400 MG: 100 TABLET ORAL at 21:01

## 2019-12-31 RX ADMIN — FOLIC ACID 1 MG: 1 TABLET ORAL at 09:05

## 2019-12-31 RX ADMIN — OXYCODONE HYDROCHLORIDE AND ACETAMINOPHEN 500 MG: 500 TABLET ORAL at 17:07

## 2019-12-31 RX ADMIN — OXYCODONE HYDROCHLORIDE 10 MG: 5 SOLUTION ORAL at 23:27

## 2019-12-31 RX ADMIN — OXYCODONE HYDROCHLORIDE AND ACETAMINOPHEN 500 MG: 500 TABLET ORAL at 09:05

## 2019-12-31 RX ADMIN — METHOCARBAMOL 750 MG: 750 TABLET, FILM COATED ORAL at 11:48

## 2019-12-31 RX ADMIN — ACETAMINOPHEN 975 MG: 325 TABLET ORAL at 21:01

## 2019-12-31 RX ADMIN — CALCIUM 1 TABLET: 500 TABLET ORAL at 17:07

## 2019-12-31 RX ADMIN — ZIPRASIDONE HCL 80 MG: 40 CAPSULE ORAL at 17:07

## 2019-12-31 RX ADMIN — FERROUS SULFATE TAB 325 MG (65 MG ELEMENTAL FE) 325 MG: 325 (65 FE) TAB at 17:04

## 2019-12-31 RX ADMIN — METHOCARBAMOL 750 MG: 750 TABLET, FILM COATED ORAL at 05:11

## 2019-12-31 RX ADMIN — LEVOTHYROXINE SODIUM 25 MCG: 25 TABLET ORAL at 05:11

## 2019-12-31 RX ADMIN — DOCUSATE SODIUM 100 MG: 100 CAPSULE, LIQUID FILLED ORAL at 09:05

## 2019-12-31 RX ADMIN — GABAPENTIN 100 MG: 100 CAPSULE ORAL at 09:05

## 2019-12-31 RX ADMIN — METHOCARBAMOL 750 MG: 750 TABLET, FILM COATED ORAL at 17:03

## 2019-12-31 RX ADMIN — GABAPENTIN 100 MG: 100 CAPSULE ORAL at 20:56

## 2019-12-31 NOTE — PLAN OF CARE
Problem: Potential for Falls  Goal: Patient will remain free of falls  Description  INTERVENTIONS:  - Assess patient frequently for physical needs  -  Identify cognitive and physical deficits and behaviors that affect risk of falls    -  [place] fall precautions as indicated by assessment   - Educate patient/family on patient safety including physical limitations  - Instruct patient to call for assistance with activity based on assessment  - Modify environment to reduce risk of injury  - Consider OT/PT consult to assist with strengthening/mobility  Outcome: Progressing     Problem: Prexisting or High Potential for Compromised Skin Integrity  Goal: Skin integrity is maintained or improved  Description  INTERVENTIONS:  - Identify patients at risk for skin breakdown  - Assess and monitor skin integrity  - Assess and monitor nutrition and hydration status  - Monitor labs   - Assess for incontinence   - Turn and reposition patient  - Assist with mobility/ambulation  - Relieve pressure over bony prominences  - Avoid friction and shearing  - Provide appropriate hygiene as needed including keeping skin clean and dry  - Evaluate need for skin moisturizer/barrier cream  - Collaborate with interdisciplinary team   - Patient/family teaching  - Consider wound care consult   Outcome: Progressing     Problem: PAIN - ADULT  Goal: Verbalizes/displays adequate comfort level or baseline comfort level  Description  Interventions:  - Encourage patient to monitor pain and request assistance  - Assess pain using appropriate pain scale  - Administer analgesics based on type and severity of pain and evaluate response  - Implement non-pharmacological measures as appropriate and evaluate response  - Consider cultural and social influences on pain and pain management  - Notify physician/advanced practitioner if interventions unsuccessful or patient reports new pain  Outcome: Progressing     Problem: INFECTION - ADULT  Goal: Absence or prevention of progression during hospitalization  Description  INTERVENTIONS:  - Assess and monitor for signs and symptoms of infection  - Monitor lab/diagnostic results  - Monitor all insertion sites, i e  indwelling lines, tubes, and drains  - Monitor endotracheal if appropriate and nasal secretions for changes in amount and color  - [place] appropriate cooling/warming therapies per order  - Administer medications as ordered  - Instruct and encourage patient and family to use good hand hygiene technique  - Identify and instruct in appropriate isolation precautions for identified infection/condition  Outcome: Progressing  Goal: Absence of fever/infection during neutropenic period  Description  INTERVENTIONS:  - Monitor WBC    Outcome: Progressing     Problem: SAFETY ADULT  Goal: Maintain or return to baseline ADL function  Description  INTERVENTIONS:  -  Assess patient's ability to carry out ADLs; assess patient's baseline for ADL function and identify physical deficits which impact ability to perform ADLs (bathing, care of mouth/teeth, toileting, grooming, dressing, etc )  - Assess/evaluate cause of self-care deficits   - Assess range of motion  - Assess patient's mobility; develop plan if impaired  - Assess patient's need for assistive devices and provide as appropriate  - Encourage maximum independence but intervene and supervise when necessary  - Involve family in performance of ADLs  - Assess for home care needs following discharge   - Consider OT consult to assist with ADL evaluation and planning for discharge  - Provide patient education as appropriate  Outcome: Progressing  Goal: Maintain or return mobility status to optimal level  Description  INTERVENTIONS:  - Assess patient's baseline mobility status (ambulation, transfers, stairs, etc )    - Identify cognitive and physical deficits and behaviors that affect mobility  - Identify mobility aids required to assist with transfers and/or ambulation (gait belt, sit-to-stand, lift, walker, cane, etc )  - [place] fall precautions as indicated by assessment  - Record patient progress and toleration of activity level on Mobility SBAR; progress patient to next Phase/Stage  - Instruct patient to call for assistance with activity based on assessment  - Consider rehabilitation consult to assist with strengthening/weightbearing, etc   Outcome: Progressing     Problem: DISCHARGE PLANNING  Goal: Discharge to home or other facility with appropriate resources  Description  INTERVENTIONS:  - Identify barriers to discharge w/patient and caregiver  - Arrange for needed discharge resources and transportation as appropriate  - Identify discharge learning needs (meds, wound care, etc )  - Arrange for interpretive services to assist at discharge as needed  - Refer to Case Management Department for coordinating discharge planning if the patient needs post-hospital services based on physician/advanced practitioner order or complex needs related to functional status, cognitive ability, or social support system  Outcome: Progressing     Problem: Knowledge Deficit  Goal: Patient/family/caregiver demonstrates understanding of disease process, treatment plan, medications, and discharge instructions  Description  Complete learning assessment and assess knowledge base    Interventions:  - Provide teaching at level of understanding  - Provide teaching via preferred learning methods  Outcome: Progressing

## 2019-12-31 NOTE — PROGRESS NOTES
Operative fixation plan tentatively for Thursday this week  Patient informed and she is willing to proceed  Will continue to follow

## 2019-12-31 NOTE — PROGRESS NOTES
Progress Note - Orthopedics   Brooks Bradleyr 61 y o  female MRN: [id number]  Unit/Bed#: CW2 214-02      Subjective:    No overnight issues, resting comfortably    Labs:  0   Lab Value Date/Time    HCT 34 4 (L) 12/27/2019 2103    HCT 27 2 (L) 12/16/2019 0511    HCT 25 7 (L) 12/15/2019 0514    HGB 11 4 (L) 12/27/2019 2103    HGB 8 8 (L) 12/16/2019 0511    HGB 8 4 (L) 12/15/2019 0514    INR 0 99 12/04/2019 1004    WBC 5 22 12/27/2019 2103    WBC 4 81 12/16/2019 0511    WBC 4 86 12/15/2019 0514    CRP 4 8 (H) 12/04/2019 1004       Meds:    Current Facility-Administered Medications:     acetaminophen (TYLENOL) tablet 975 mg, 975 mg, Oral, Q8H [facility], Zbigniew Peres Veres, DO, 975 mg at 12/31/19 0511    aluminum-magnesium hydroxide-simethicone (MYLANTA) 200-200-20 mg/5 mL oral suspension 30 mL, 30 mL, Oral, Q4H PRN, ABENA Landa    ascorbic acid (VITAMIN C) tablet 500 mg, 500 mg, Oral, BID, Lavanda Larisa Veres, DO, 500 mg at 12/30/19 1840    calcium carbonate (OYSTER SHELL,OSCAL) 500 mg tablet 1 tablet, 1 tablet, Oral, BID With Meals, Hetal Fleet, DO, 1 tablet at 12/30/19 1608    calcium carbonate (TUMS) chewable tablet 500 mg, 500 mg, Oral, Daily PRN, ABENA Landa, 500 mg at 12/29/19 1321    cholecalciferol (VITAMIN D3) tablet 2,000 Units, 2,000 Units, Oral, Daily, Hetal Fleet, DO, 2,000 Units at 12/30/19 7951    docusate sodium (COLACE) capsule 100 mg, 100 mg, Oral, BID, Lavanda Larisa Veres, DO, 100 mg at 12/30/19 1841    enoxaparin (LOVENOX) subcutaneous injection 40 mg, 40 mg, Subcutaneous, Q24H [facility], Mally Bonner MD    ferrous sulfate tablet 325 mg, 325 mg, Oral, BID With Meals, Hetal Charles DO, 325 mg at 09/24/21 7517    folic acid (FOLVITE) tablet 1 mg, 1 mg, Oral, Daily, Zbigniew Flores DO, 1 mg at 12/30/19 8713    gabapentin (NEURONTIN) capsule 100 mg, 100 mg, Oral, TID, Zbigniew Flores DO, 100 mg at 12/30/19 2104    levothyroxine tablet 25 mcg, 25 mcg, Oral, Early Morning, Nerissa Spell Veres, DO, 25 mcg at 12/31/19 0511    lidocaine (LIDODERM) 5 % patch 2 patch, 2 patch, Topical, Daily, Demetrius Campos MD, 2 patch at 12/30/19 0858    LORazepam (ATIVAN) tablet 2 mg, 2 mg, Oral, Q8H PRN, Skylar Craze, DO, 2 mg at 12/30/19 0858    methocarbamol (ROBAXIN) tablet 750 mg, 750 mg, Oral, Q6H [facility], Nerissa Spell Veres, DO, 750 mg at 12/31/19 0511    multivitamin-minerals (CENTRUM) tablet 1 tablet, 1 tablet, Oral, Daily, Skylar Craze, DO, 1 tablet at 12/30/19 0858    oxyCODONE (ROXICODONE) IR tablet 5 mg, 5 mg, Oral, Q4H PRN, [place], CRNP, 5 mg at 12/31/19 0511    oxyCODONE (ROXICODONE) oral solution 10 mg, 10 mg, Oral, Q4H PRN, Nerissa Spell Veres, DO, 10 mg at 12/30/19 1841    pantoprazole (PROTONIX) EC tablet 40 mg, 40 mg, Oral, BID AC, Nerissa Spell Veres, DO, 40 mg at 12/31/19 0511    PARoxetine (PAXIL) tablet 60 mg, 60 mg, Oral, QAM, Nerissa Spell Veres, DO, 60 mg at 12/30/19 6089    QUEtiapine (SEROquel) tablet 400 mg, 400 mg, Oral, HS, Nerissa Spell Veres, DO, 400 mg at 12/30/19 2105    ziprasidone (GEODON) capsule 80 mg, 80 mg, Oral, BID, Nerissa Spell Veres, DO, 80 mg at 12/30/19 1840    Blood Culture:   Lab Results   Component Value Date    BLOODCX No Growth After 5 Days  08/13/2018    BLOODCX Staphylococcus coagulase negative (A) 08/13/2018       Wound Culture:   No results found for: WOUNDCULT    Ins and Outs:  I/O last 24 hours: In: 1440 [P O :1440]  Out: 7800 [Urine:7800]          Physical:  Vitals:    12/30/19 2257   BP: 132/75   Pulse: 86   Resp: 19   Temp: 98 °F (36 7 °C)   SpO2: 97%     Musculoskeletal: right Lower Extremity  · skin intact   · Motor and sensory grossly intact  · Palpable distal pulses    Assessment:    61 y  o female with right periprosthetic femur fx, pending revision operative fixation     Plan:  · NWB RLE  · Pain control  · DVT ppx  · Dispo: Ortho will follow   · Definitive plan for operative fixation pending    Khang Todd MD

## 2019-12-31 NOTE — ASSESSMENT & PLAN NOTE
· Will monitor  · Psychiatric meds and pain will contribute to hyponatremia  · No labs in few days  · Check BMP in AM

## 2019-12-31 NOTE — PROGRESS NOTES
Progress Note - Dontrell Snide 1960, 61 y o  female MRN: [id number]    Unit/Bed#: CW2 214-02 Encounter: [id number]    Primary Care Provider: Pantera Treadwell MD   Date and time admitted to hospital: 12/27/2019 10:59 AM    * S/P right hip fracture  Assessment & Plan  · Await orthopedic plan for surgical repair-tentatively planned for 1/2/2020  · Patient was seen in outpatient office status post fall at home (12/26/2019)  · Xray R hip 12/28 shows periprosthetic femur fracture   · Continue pain regimen with scheduled Tylenol, p r n  Oxycodone  · Gabapentin t i d   · PT/OT evaluation recommend rehab choices made and submitted once medically clear from ortho standpoint       Schizoaffective disorder, depressive [facility] ([facility]  Assessment & Plan  · Continue Seroquel at bedtime  · Geodon b i d   · Ativan p r n  Iron deficiency anemia secondary to inadequate dietary iron intake  Assessment & Plan  · Continue iron supplementation    Chronic bilateral low back pain without sciatica  Assessment & Plan  · Continue Tylenol, oxycodone  · Gabapentin t i d  · Added meds for breakthrough pain  · PT/OT    ABIMAEL (generalized anxiety disorder)  Assessment & Plan  · Continue home medications:  · Lorazepam p r n, paroxetine daily, Geodon b i d, Seroquel at bedtime    Acquired hypothyroidism  Assessment & Plan  · Continue daily levothyroxine    Localized osteoporosis with current pathological fracture with routine healing  Assessment & Plan  · Vitamin-D supplementation  · Outpatient endocrine follow-up    Hyponatremia  Assessment & Plan  · Will monitor  · Psychiatric meds and pain will contribute to hyponatremia  · No labs in few days  · Check BMP in AM      VTE Pharmacologic Prophylaxis:   Pharmacologic: Enoxaparin (Lovenox)  Mechanical VTE Prophylaxis in Place: Yes    Patient Centered Rounds: I have performed bedside rounds with nursing staff today      Discussions with Specialists or Other Care Team Provider: appreciate ortho, d/w CM    Education and Discussions with Family / Patient: patient  Time Spent for Care: 30 minutes  More than 50% of total time spent on counseling and coordination of care as described above  Current Length of Stay: 2 day(s)    Current Patient Status: Inpatient   Certification Statement: The patient will continue to require additional inpatient hospital stay due to ongoing surgical repair planned for Thursday    Discharge Plan: not yet stable, pending fixation of hip    Code Status: Level 1 - Full Code      Subjective:   Doing okay today  C/O pain in hip  Otherwise no issues  Has questions about surgery  Objective:     Vitals:   Temp (24hrs), Av °F (36 7 °C), Min:97 6 °F (36 4 °C), Max:98 3 °F (36 8 °C)    Temp:  [97 6 °F (36 4 °C)-98 3 °F (36 8 °C)] 97 6 °F (36 4 °C)  HR:  [85-99] 99  Resp:  [18-19] 19  BP: (131-132)/(74-76) 131/74  SpO2:  [93 %-97 %] 93 %  Body mass index is 27 81 kg/m²  Input and Output Summary (last 24 hours): Intake/Output Summary (Last 24 hours) at 2019 0830  Last data filed at 2019 0800  Gross per 24 hour   Intake 2520 ml   Output 8900 ml   Net -6380 ml       Physical Exam:     Physical Exam   Constitutional: She is oriented to person, place, and time  Cardiovascular: Normal rate and regular rhythm  Pulmonary/Chest: Effort normal and breath sounds normal    Abdominal: Soft  Bowel sounds are normal  She exhibits no distension  Neurological: She is alert and oriented to person, place, and time  Psychiatric:   Appears slightly anxious    Nursing note and vitals reviewed        Additional Data:     Labs:    Results from last 7 days   Lab Units 19  0435 19  2103   WBC Thousand/uL  --  5 22   HEMOGLOBIN g/dL  --  11 4*   HEMATOCRIT %  --  34 4*   PLATELETS Thousands/uL 419* 463*   NEUTROS PCT %  --  74   LYMPHS PCT %  --  12*   MONOS PCT %  --  12   EOS PCT %  --  0     Results from last 7 days   Lab Units 19  0435   POTASSIUM mmol/L 3 5 CHLORIDE mmol/L 97*   CO2 mmol/L 32   BUN mg/dL 4*   CREATININE mg/dL 0 47*   CALCIUM mg/dL 9 5           * I Have Reviewed All Lab Data Listed Above  * Additional Pertinent Lab Tests Reviewed:  All Labs Within Last 24 Hours Reviewed    Imaging:    Imaging Reports Reviewed Today Include: all  Imaging Personally Reviewed by Myself Includes:  none    Recent Cultures (last 7 days):           Last 24 Hours Medication List:     Current Facility-Administered Medications:  acetaminophen 975 mg Oral Q8H [facility] Omar Flores, DO   aluminum-magnesium hydroxide-simethicone 30 mL Oral Q4H PRN ABENA Sanders   ascorbic acid 500 mg Oral BID Armida Bonner, DO   calcium carbonate 1 tablet Oral BID With Meals Armida Bonner, DO   calcium carbonate 500 mg Oral Daily PRN ABENA Sanders   cholecalciferol 2,000 Units Oral Daily [facility] Michael Veres, DO   docusate sodium 100 mg Oral BID [facility], DO   enoxaparin 40 mg Subcutaneous Q24H [facility] Ramiro Craft MD   ferrous sulfate 325 mg Oral BID With Meals Armida Bonner, DO   folic acid 1 mg Oral Daily [facility] Michael Veres, DO   gabapentin 100 mg Oral TID Armida Bonner, DO   HYDROmorphone 0 2 mg Intravenous Q4H PRN Kelsea Crockett PA-C   levothyroxine 25 mcg Oral Early Morning Armida Bonner, DO   lidocaine 2 patch Topical Daily Pankaj Carmona MD   LORazepam 2 mg Oral Q8H PRN Armida Bonner, DO   methocarbamol 750 mg Oral Q6H [facility] Armida Bonner, DO   multivitamin-minerals 1 tablet Oral Daily [facility] Michael Veres, DO   oxyCODONE 5 mg Oral Q4H PRN ABENA Woodson   oxyCODONE 10 mg Oral Q4H PRN Armida Bonner, DO   pantoprazole 40 mg Oral BID AC [facility] Michael Veres, DO   PARoxetine 60 mg Oral QAM Omar Flores, DO   QUEtiapine 400 mg Oral HS [facility] Michael Veres, DO   ziprasidone 80 mg Oral BID Armida Bonner, DO        Today, Patient Was Seen By: Kelsea Crockett PA-C    ** Please Note: Dictation voice to text software may have been used in the creation of this document   **

## 2019-12-31 NOTE — PROGRESS NOTES
Chart review revealed that patient was admitted to [facility] on 12/27/19 for R femur fx s/p R hip replacement  Patient is NWB Patient may have surgical revision of R hip on 1/2/20  Will follow the chart for D/C plan

## 2019-12-31 NOTE — SOCIAL WORK
OO requested new PASRR showing nothing changed and a verbal confirmation from [facility] that we can use the same letter of determination from previous admission  CM left message for Edelmira Deal at [facility]  CM spoke with ortho and anticipate pt surgery on Thursday 1/2/19  Will need updated PT/OT notes post surgery for AUTH

## 2019-12-31 NOTE — ASSESSMENT & PLAN NOTE
· Continue home medications:  · Lorazepam p r n, paroxetine daily, Geodon b i d, Seroquel at bedtime

## 2019-12-31 NOTE — ASSESSMENT & PLAN NOTE
· Await orthopedic plan for surgical repair-tentatively planned for 1/2/2020  · Patient was seen in outpatient office status post fall at home (12/26/2019)  · Xray R hip 12/28 shows periprosthetic femur fracture   · Continue pain regimen with scheduled Tylenol, p r n   Oxycodone  · Gabapentin t i d   · PT/OT evaluation recommend rehab choices made and submitted once medically clear from ortho standpoint

## 2020-01-01 ENCOUNTER — ANESTHESIA EVENT (INPATIENT)
Dept: PERIOP | Facility: HOSPITAL | Age: 60
DRG: 466 | End: 2020-01-01
Payer: COMMERCIAL

## 2020-01-01 LAB
ANION GAP SERPL CALCULATED.3IONS-SCNC: 4 MMOL/L (ref 4–13)
ATRIAL RATE: 94 BPM
BASOPHILS # BLD AUTO: 0.02 THOUSANDS/ΜL (ref 0–0.1)
BASOPHILS NFR BLD AUTO: 1 % (ref 0–1)
BUN SERPL-MCNC: 8 MG/DL (ref 5–25)
CALCIUM SERPL-MCNC: 9.7 MG/DL (ref 8.3–10.1)
CHLORIDE SERPL-SCNC: 94 MMOL/L (ref 100–108)
CO2 SERPL-SCNC: 34 MMOL/L (ref 21–32)
CREAT SERPL-MCNC: 0.58 MG/DL (ref 0.6–1.3)
EOSINOPHIL # BLD AUTO: 0.07 THOUSAND/ΜL (ref 0–0.61)
EOSINOPHIL NFR BLD AUTO: 2 % (ref 0–6)
ERYTHROCYTE [DISTWIDTH] IN BLOOD BY AUTOMATED COUNT: 19.2 % (ref 11.6–15.1)
GFR SERPL CREATININE-BSD FRML MDRD: 101 ML/MIN/1.73SQ M
GLUCOSE SERPL-MCNC: 77 MG/DL (ref 65–140)
HCT VFR BLD AUTO: 36.9 % (ref 34.8–46.1)
HGB BLD-MCNC: 11.8 G/DL (ref 11.5–15.4)
IMM GRANULOCYTES # BLD AUTO: 0.01 THOUSAND/UL (ref 0–0.2)
IMM GRANULOCYTES NFR BLD AUTO: 0 % (ref 0–2)
LYMPHOCYTES # BLD AUTO: 0.63 THOUSANDS/ΜL (ref 0.6–4.47)
LYMPHOCYTES NFR BLD AUTO: 20 % (ref 14–44)
MCH RBC QN AUTO: 28.4 PG (ref 26.8–34.3)
MCHC RBC AUTO-ENTMCNC: 32 G/DL (ref 31.4–37.4)
MCV RBC AUTO: 89 FL (ref 82–98)
MONOCYTES # BLD AUTO: 0.35 THOUSAND/ΜL (ref 0.17–1.22)
MONOCYTES NFR BLD AUTO: 11 % (ref 4–12)
NEUTROPHILS # BLD AUTO: 2.1 THOUSANDS/ΜL (ref 1.85–7.62)
NEUTS SEG NFR BLD AUTO: 66 % (ref 43–75)
NRBC BLD AUTO-RTO: 0 /100 WBCS
P AXIS: 51 DEGREES
PLATELET # BLD AUTO: 362 THOUSANDS/UL (ref 149–390)
PMV BLD AUTO: 7.9 FL (ref 8.9–12.7)
POTASSIUM SERPL-SCNC: 3.6 MMOL/L (ref 3.5–5.3)
PR INTERVAL: 126 MS
QRS AXIS: 1 DEGREES
QRSD INTERVAL: 82 MS
QT INTERVAL: 362 MS
QTC INTERVAL: 452 MS
RBC # BLD AUTO: 4.15 MILLION/UL (ref 3.81–5.12)
SODIUM SERPL-SCNC: 132 MMOL/L (ref 136–145)
T WAVE AXIS: 56 DEGREES
VENTRICULAR RATE: 94 BPM
WBC # BLD AUTO: 3.18 THOUSAND/UL (ref 4.31–10.16)

## 2020-01-01 PROCEDURE — 99232 SBSQ HOSP IP/OBS MODERATE 35: CPT | Performed by: INTERNAL MEDICINE

## 2020-01-01 PROCEDURE — 85025 COMPLETE CBC W/AUTO DIFF WBC: CPT | Performed by: INTERNAL MEDICINE

## 2020-01-01 PROCEDURE — 93005 ELECTROCARDIOGRAM TRACING: CPT

## 2020-01-01 PROCEDURE — 93010 ELECTROCARDIOGRAM REPORT: CPT | Performed by: INTERNAL MEDICINE

## 2020-01-01 PROCEDURE — NS001 PR NO SIGNATURE OR ATTESTATION: Performed by: ORTHOPAEDIC SURGERY

## 2020-01-01 PROCEDURE — 80048 BASIC METABOLIC PNL TOTAL CA: CPT | Performed by: INTERNAL MEDICINE

## 2020-01-01 RX ORDER — SODIUM CHLORIDE, SODIUM LACTATE, POTASSIUM CHLORIDE, CALCIUM CHLORIDE 600; 310; 30; 20 MG/100ML; MG/100ML; MG/100ML; MG/100ML
250 INJECTION, SOLUTION INTRAVENOUS CONTINUOUS
Status: DISCONTINUED | OUTPATIENT
Start: 2020-01-02 | End: 2020-01-04

## 2020-01-01 RX ADMIN — METHOCARBAMOL 750 MG: 750 TABLET, FILM COATED ORAL at 06:00

## 2020-01-01 RX ADMIN — OXYCODONE HYDROCHLORIDE AND ACETAMINOPHEN 500 MG: 500 TABLET ORAL at 17:28

## 2020-01-01 RX ADMIN — METHOCARBAMOL 750 MG: 750 TABLET, FILM COATED ORAL at 11:46

## 2020-01-01 RX ADMIN — GABAPENTIN 100 MG: 100 CAPSULE ORAL at 08:56

## 2020-01-01 RX ADMIN — Medication 1 TABLET: at 08:57

## 2020-01-01 RX ADMIN — CALCIUM 1 TABLET: 500 TABLET ORAL at 08:55

## 2020-01-01 RX ADMIN — ACETAMINOPHEN 975 MG: 325 TABLET ORAL at 06:00

## 2020-01-01 RX ADMIN — PAROXETINE 60 MG: 20 TABLET, FILM COATED ORAL at 08:57

## 2020-01-01 RX ADMIN — LEVOTHYROXINE SODIUM 25 MCG: 25 TABLET ORAL at 06:00

## 2020-01-01 RX ADMIN — METHOCARBAMOL 750 MG: 750 TABLET, FILM COATED ORAL at 17:27

## 2020-01-01 RX ADMIN — ZIPRASIDONE HCL 80 MG: 40 CAPSULE ORAL at 18:42

## 2020-01-01 RX ADMIN — LIDOCAINE 2 PATCH: 50 PATCH CUTANEOUS at 08:52

## 2020-01-01 RX ADMIN — OXYCODONE HYDROCHLORIDE 10 MG: 5 SOLUTION ORAL at 06:00

## 2020-01-01 RX ADMIN — ACETAMINOPHEN 975 MG: 325 TABLET ORAL at 21:17

## 2020-01-01 RX ADMIN — DOCUSATE SODIUM 100 MG: 100 CAPSULE, LIQUID FILLED ORAL at 17:27

## 2020-01-01 RX ADMIN — CALCIUM 1 TABLET: 500 TABLET ORAL at 15:38

## 2020-01-01 RX ADMIN — ENOXAPARIN SODIUM 40 MG: 40 INJECTION SUBCUTANEOUS at 08:51

## 2020-01-01 RX ADMIN — OXYCODONE HYDROCHLORIDE AND ACETAMINOPHEN 500 MG: 500 TABLET ORAL at 08:56

## 2020-01-01 RX ADMIN — PANTOPRAZOLE SODIUM 40 MG: 20 TABLET, DELAYED RELEASE ORAL at 06:00

## 2020-01-01 RX ADMIN — QUETIAPINE FUMARATE 400 MG: 100 TABLET ORAL at 21:17

## 2020-01-01 RX ADMIN — OXYCODONE HYDROCHLORIDE 5 MG: 5 TABLET ORAL at 17:26

## 2020-01-01 RX ADMIN — MELATONIN 2000 UNITS: at 08:56

## 2020-01-01 RX ADMIN — FOLIC ACID 1 MG: 1 TABLET ORAL at 08:57

## 2020-01-01 RX ADMIN — FERROUS SULFATE TAB 325 MG (65 MG ELEMENTAL FE) 325 MG: 325 (65 FE) TAB at 08:56

## 2020-01-01 RX ADMIN — GABAPENTIN 100 MG: 100 CAPSULE ORAL at 15:38

## 2020-01-01 RX ADMIN — LORAZEPAM 2 MG: 1 TABLET ORAL at 08:50

## 2020-01-01 RX ADMIN — FERROUS SULFATE TAB 325 MG (65 MG ELEMENTAL FE) 325 MG: 325 (65 FE) TAB at 15:38

## 2020-01-01 RX ADMIN — ACETAMINOPHEN 975 MG: 325 TABLET ORAL at 13:54

## 2020-01-01 RX ADMIN — PANTOPRAZOLE SODIUM 40 MG: 20 TABLET, DELAYED RELEASE ORAL at 15:38

## 2020-01-01 RX ADMIN — GABAPENTIN 100 MG: 100 CAPSULE ORAL at 21:17

## 2020-01-01 RX ADMIN — ZIPRASIDONE HCL 80 MG: 40 CAPSULE ORAL at 08:55

## 2020-01-01 NOTE — PLAN OF CARE
Problem: Potential for Falls  Goal: Patient will remain free of falls  Description  INTERVENTIONS:  - Assess patient frequently for physical needs  -  Identify cognitive and physical deficits and behaviors that affect risk of falls    -  [place] fall precautions as indicated by assessment   - Educate patient/family on patient safety including physical limitations  - Instruct patient to call for assistance with activity based on assessment  - Modify environment to reduce risk of injury  - Consider OT/PT consult to assist with strengthening/mobility  Outcome: Progressing     Problem: Prexisting or High Potential for Compromised Skin Integrity  Goal: Skin integrity is maintained or improved  Description  INTERVENTIONS:  - Identify patients at risk for skin breakdown  - Assess and monitor skin integrity  - Assess and monitor nutrition and hydration status  - Monitor labs   - Assess for incontinence   - Turn and reposition patient  - Assist with mobility/ambulation  - Relieve pressure over bony prominences  - Avoid friction and shearing  - Provide appropriate hygiene as needed including keeping skin clean and dry  - Evaluate need for skin moisturizer/barrier cream  - Collaborate with interdisciplinary team   - Patient/family teaching  - Consider wound care consult   Outcome: Progressing     Problem: PAIN - ADULT  Goal: Verbalizes/displays adequate comfort level or baseline comfort level  Description  Interventions:  - Encourage patient to monitor pain and request assistance  - Assess pain using appropriate pain scale  - Administer analgesics based on type and severity of pain and evaluate response  - Implement non-pharmacological measures as appropriate and evaluate response  - Consider cultural and social influences on pain and pain management  - Notify physician/advanced practitioner if interventions unsuccessful or patient reports new pain  Outcome: Progressing     Problem: INFECTION - ADULT  Goal: Absence or prevention of progression during hospitalization  Description  INTERVENTIONS:  - Assess and monitor for signs and symptoms of infection  - Monitor lab/diagnostic results  - Monitor all insertion sites, i e  indwelling lines, tubes, and drains  - Monitor endotracheal if appropriate and nasal secretions for changes in amount and color  - [place] appropriate cooling/warming therapies per order  - Administer medications as ordered  - Instruct and encourage patient and family to use good hand hygiene technique  - Identify and instruct in appropriate isolation precautions for identified infection/condition  Outcome: Progressing     Problem: INFECTION - ADULT  Goal: Absence or prevention of progression during hospitalization  Description  INTERVENTIONS:  - Assess and monitor for signs and symptoms of infection  - Monitor lab/diagnostic results  - Monitor all insertion sites, i e  indwelling lines, tubes, and drains  - Monitor endotracheal if appropriate and nasal secretions for changes in amount and color  - [place] appropriate cooling/warming therapies per order  - Administer medications as ordered  - Instruct and encourage patient and family to use good hand hygiene technique  - Identify and instruct in appropriate isolation precautions for identified infection/condition  Outcome: Progressing  Goal: Absence of fever/infection during neutropenic period  Description  INTERVENTIONS:  - Monitor WBC    Outcome: Progressing     Problem: SAFETY ADULT  Goal: Maintain or return to baseline ADL function  Description  INTERVENTIONS:  -  Assess patient's ability to carry out ADLs; assess patient's baseline for ADL function and identify physical deficits which impact ability to perform ADLs (bathing, care of mouth/teeth, toileting, grooming, dressing, etc )  - Assess/evaluate cause of self-care deficits   - Assess range of motion  - Assess patient's mobility; develop plan if impaired  - Assess patient's need for assistive devices and provide as appropriate  - Encourage maximum independence but intervene and supervise when necessary  - Involve family in performance of ADLs  - Assess for home care needs following discharge   - Consider OT consult to assist with ADL evaluation and planning for discharge  - Provide patient education as appropriate  Outcome: Progressing  Goal: Maintain or return mobility status to optimal level  Description  INTERVENTIONS:  - Assess patient's baseline mobility status (ambulation, transfers, stairs, etc )    - Identify cognitive and physical deficits and behaviors that affect mobility  - Identify mobility aids required to assist with transfers and/or ambulation (gait belt, sit-to-stand, lift, walker, cane, etc )  - [place] fall precautions as indicated by assessment  - Record patient progress and toleration of activity level on Mobility SBAR; progress patient to next Phase/Stage  - Instruct patient to call for assistance with activity based on assessment  - Consider rehabilitation consult to assist with strengthening/weightbearing, etc   Outcome: Progressing     Problem: DISCHARGE PLANNING  Goal: Discharge to home or other facility with appropriate resources  Description  INTERVENTIONS:  - Identify barriers to discharge w/patient and caregiver  - Arrange for needed discharge resources and transportation as appropriate  - Identify discharge learning needs (meds, wound care, etc )  - Arrange for interpretive services to assist at discharge as needed  - Refer to Case Management Department for coordinating discharge planning if the patient needs post-hospital services based on physician/advanced practitioner order or complex needs related to functional status, cognitive ability, or social support system  Outcome: Progressing     Problem: Knowledge Deficit  Goal: Patient/family/caregiver demonstrates understanding of disease process, treatment plan, medications, and discharge instructions  Description  Complete learning assessment and assess knowledge base    Interventions:  - Provide teaching at level of understanding  - Provide teaching via preferred learning methods  Outcome: Progressing

## 2020-01-01 NOTE — PROGRESS NOTES
Progress Note - Orthopedics   Snehal Arturo 61 y o  female MRN: [id number]  Unit/Bed#: CW2 214-02      Subjective:    61 y  o female with R hip periprosthetic fx  No acute events, no complaints  Pt doing well  States she is having pain     Labs:  0   Lab Value Date/Time    HCT 36 9 01/01/2020 0542    HCT 34 4 (L) 12/27/2019 2103    HCT 27 2 (L) 12/16/2019 0511    HGB 11 8 01/01/2020 0542    HGB 11 4 (L) 12/27/2019 2103    HGB 8 8 (L) 12/16/2019 0511    INR 0 99 12/04/2019 1004    WBC 3 18 (L) 01/01/2020 0542    WBC 5 22 12/27/2019 2103    WBC 4 81 12/16/2019 0511    CRP 4 8 (H) 12/04/2019 1004       Meds:    Current Facility-Administered Medications:     acetaminophen (TYLENOL) tablet 975 mg, 975 mg, Oral, Q8H [address], Mita Flores DO, 975 mg at 01/01/20 0600    aluminum-magnesium hydroxide-simethicone (MYLANTA) 200-200-20 mg/5 mL oral suspension 30 mL, 30 mL, Oral, Q4H PRN, Jami Duane, CRNP    ascorbic acid (VITAMIN C) tablet 500 mg, 500 mg, Oral, BID, Mita Flores DO, 500 mg at 12/31/19 1707    calcium carbonate (OYSTER SHELL,OSCAL) 500 mg tablet 1 tablet, 1 tablet, Oral, BID With Meals, Larissa Tolbert DO, 1 tablet at 12/31/19 1707    calcium carbonate (TUMS) chewable tablet 500 mg, 500 mg, Oral, Daily PRN, Jami Duane, CRNP, 500 mg at 12/29/19 1321    cholecalciferol (VITAMIN D3) tablet 2,000 Units, 2,000 Units, Oral, Daily, Larissa Tolbert DO, 2,000 Units at 12/31/19 0905    docusate sodium (COLACE) capsule 100 mg, 100 mg, Oral, BID, Mita Flores DO, 100 mg at 12/31/19 1704    enoxaparin (LOVENOX) subcutaneous injection 40 mg, 40 mg, Subcutaneous, Q24H [address], Quintin Ying MD, 40 mg at 12/31/19 2910    ferrous sulfate tablet 325 mg, 325 mg, Oral, BID With Meals, Larissa Tolbert DO, 325 mg at 45/00/59 7010    folic acid (FOLVITE) tablet 1 mg, 1 mg, Oral, Daily, Mita Flores DO, 1 mg at 12/31/19 0905    gabapentin (NEURONTIN) capsule 100 mg, 100 mg, Oral, TID, Cristal Michael Veres, DO, 100 mg at 12/31/19 2056    HYDROmorphone (DILAUDID) injection 0 2 mg, 0 2 mg, Intravenous, Q4H PRN, Kelsea Crockett PA-C    levothyroxine tablet 25 mcg, 25 mcg, Oral, Early Morning, Cristal Michael Veres, DO, 25 mcg at 01/01/20 0600    lidocaine (LIDODERM) 5 % patch 2 patch, 2 patch, Topical, Daily, Pankaj Carmona MD, 2 patch at 12/31/19 4400    LORazepam (ATIVAN) tablet 2 mg, 2 mg, Oral, Q8H PRN, Jolaine Kailey, DO, 2 mg at 12/31/19 0906    methocarbamol (ROBAXIN) tablet 750 mg, 750 mg, Oral, Q6H [address], Cristal Michael Veres, DO, 750 mg at 01/01/20 0600    multivitamin-minerals (CENTRUM) tablet 1 tablet, 1 tablet, Oral, Daily, Jolaine Kailey, DO, 1 tablet at 12/31/19 0905    oxyCODONE (ROXICODONE) IR tablet 5 mg, 5 mg, Oral, Q4H PRN, Izora Mitts, CRNP, 5 mg at 12/31/19 0511    oxyCODONE (ROXICODONE) oral solution 10 mg, 10 mg, Oral, Q4H PRN, Cristal Michael Veres, DO, 10 mg at 01/01/20 0600    pantoprazole (PROTONIX) EC tablet 40 mg, 40 mg, Oral, BID ACOmar Veres, DO, 40 mg at 01/01/20 0600    PARoxetine (PAXIL) tablet 60 mg, 60 mg, Oral, QAM, Cristal Michael Veres, DO, 60 mg at 12/31/19 0906    QUEtiapine (SEROquel) tablet 400 mg, 400 mg, Oral, HS, Cristal Michael Veres, DO, 400 mg at 12/31/19 2101    ziprasidone (GEODON) capsule 80 mg, 80 mg, Oral, BID, Cristal Michael Veres, DO, 80 mg at 12/31/19 1707    Blood Culture:   Lab Results   Component Value Date    BLOODCX No Growth After 5 Days  08/13/2018    BLOODCX Staphylococcus coagulase negative (A) 08/13/2018       Wound Culture:   No results found for: WOUNDCULT    Ins and Outs:  I/O last 24 hours: In: 1080 [P O :1080]  Out: 9237 [Urine:5950]          Physical:  Vitals:    01/01/20 0345   BP: 115/78   Pulse: 96   Resp: 18   Temp: 97 8 °F (36 6 °C)   SpO2: 96%     Musculoskeletal: right Lower Extremity  · Skin with well healed incision  · TTP over thigh  · SILT s/s/sp/dp/t  +fhl/ehl, +ankle dorsi/plantar flexion    2+ DP pulse    Assessment:    61 y  o female with R hip periprosthetic fx       Plan:  · NWB RLE  · To OR tomorrow for revision vs ORIF of the right hip PPfx  · Hold anticoagulation tomorrow in AM in prep for OR  · NPO at midnight  · Will order preop labs  · Consented  · Clearance/preop risk per Anastacia Power MD

## 2020-01-01 NOTE — PLAN OF CARE
Problem: Potential for Falls  Goal: Patient will remain free of falls  Description  INTERVENTIONS:  - Assess patient frequently for physical needs  -  Identify cognitive and physical deficits and behaviors that affect risk of falls    -  [place] fall precautions as indicated by assessment   - Educate patient/family on patient safety including physical limitations  - Instruct patient to call for assistance with activity based on assessment  - Modify environment to reduce risk of injury  - Consider OT/PT consult to assist with strengthening/mobility  Outcome: Progressing     Problem: Prexisting or High Potential for Compromised Skin Integrity  Goal: Skin integrity is maintained or improved  Description  INTERVENTIONS:  - Identify patients at risk for skin breakdown  - Assess and monitor skin integrity  - Assess and monitor nutrition and hydration status  - Monitor labs   - Assess for incontinence   - Turn and reposition patient  - Assist with mobility/ambulation  - Relieve pressure over bony prominences  - Avoid friction and shearing  - Provide appropriate hygiene as needed including keeping skin clean and dry  - Evaluate need for skin moisturizer/barrier cream  - Collaborate with interdisciplinary team   - Patient/family teaching  - Consider wound care consult   Outcome: Progressing     Problem: PAIN - ADULT  Goal: Verbalizes/displays adequate comfort level or baseline comfort level  Description  Interventions:  - Encourage patient to monitor pain and request assistance  - Assess pain using appropriate pain scale  - Administer analgesics based on type and severity of pain and evaluate response  - Implement non-pharmacological measures as appropriate and evaluate response  - Consider cultural and social influences on pain and pain management  - Notify physician/advanced practitioner if interventions unsuccessful or patient reports new pain  Outcome: Progressing     Problem: INFECTION - ADULT  Goal: Absence or prevention of progression during hospitalization  Description  INTERVENTIONS:  - Assess and monitor for signs and symptoms of infection  - Monitor lab/diagnostic results  - Monitor all insertion sites, i e  indwelling lines, tubes, and drains  - Monitor endotracheal if appropriate and nasal secretions for changes in amount and color  - Trevor appropriate cooling/warming therapies per order  - Administer medications as ordered  - Instruct and encourage patient and family to use good hand hygiene technique  - Identify and instruct in appropriate isolation precautions for identified infection/condition  Outcome: Progressing  Goal: Absence of fever/infection during neutropenic period  Description  INTERVENTIONS:  - Monitor WBC    Outcome: Progressing     Problem: SAFETY ADULT  Goal: Maintain or return to baseline ADL function  Description  INTERVENTIONS:  -  Assess patient's ability to carry out ADLs; assess patient's baseline for ADL function and identify physical deficits which impact ability to perform ADLs (bathing, care of mouth/teeth, toileting, grooming, dressing, etc )  - Assess/evaluate cause of self-care deficits   - Assess range of motion  - Assess patient's mobility; develop plan if impaired  - Assess patient's need for assistive devices and provide as appropriate  - Encourage maximum independence but intervene and supervise when necessary  - Involve family in performance of ADLs  - Assess for home care needs following discharge   - Consider OT consult to assist with ADL evaluation and planning for discharge  - Provide patient education as appropriate  Outcome: Progressing  Goal: Maintain or return mobility status to optimal level  Description  INTERVENTIONS:  - Assess patient's baseline mobility status (ambulation, transfers, stairs, etc )    - Identify cognitive and physical deficits and behaviors that affect mobility  - Identify mobility aids required to assist with transfers and/or ambulation (gait belt, sit-to-stand, lift, walker, cane, etc )  - [place] fall precautions as indicated by assessment  - Record patient progress and toleration of activity level on Mobility SBAR; progress patient to next Phase/Stage  - Instruct patient to call for assistance with activity based on assessment  - Consider rehabilitation consult to assist with strengthening/weightbearing, etc   Outcome: Progressing     Problem: DISCHARGE PLANNING  Goal: Discharge to home or other facility with appropriate resources  Description  INTERVENTIONS:  - Identify barriers to discharge w/patient and caregiver  - Arrange for needed discharge resources and transportation as appropriate  - Identify discharge learning needs (meds, wound care, etc )  - Arrange for interpretive services to assist at discharge as needed  - Refer to Case Management Department for coordinating discharge planning if the patient needs post-hospital services based on physician/advanced practitioner order or complex needs related to functional status, cognitive ability, or social support system  Outcome: Progressing     Problem: Knowledge Deficit  Goal: Patient/family/caregiver demonstrates understanding of disease process, treatment plan, medications, and discharge instructions  Description  Complete learning assessment and assess knowledge base    Interventions:  - Provide teaching at level of understanding  - Provide teaching via preferred learning methods  Outcome: Progressing

## 2020-01-01 NOTE — ASSESSMENT & PLAN NOTE
· Planned for surgical repair 1/2/2020  Attending to officially clear for surgery  Will hold dvt ppx tomorrow in AM per ortho  · CT femur R w/o contrast 12/31: Obliquely oriented almost nondisplaced proximal femoral shaft fracture around the site of the femoral stem without significant displacement of the hardware  Moderate amount of soft tissue calcification in the vicinity of the injury  Diminished density of the vastus intermedius muscle probably representing some muscular edema in the deep compartment  No dislocation at the hip  · Continue pain regimen with scheduled Tylenol, p r n   Oxycodone and IV dilaudid for breakthrough pain  · Gabapentin t i d   · PT/OT evaluation recommend rehab choices made and submitted once medically clear from ortho standpoint

## 2020-01-01 NOTE — ASSESSMENT & PLAN NOTE
· Will monitor  · Psychiatric meds and pain will contribute to hyponatremia  · Na has remained stable

## 2020-01-01 NOTE — PROGRESS NOTES
Progress Note - Giselle Maza 1960, 61 y o  female MRN: [id number]    Unit/Bed#: CW2 214-02 Encounter: [id number]    Primary Care Provider: Stephany Saenz MD   Date and time admitted to hospital: 12/27/2019 10:59 AM    * S/P right hip fracture  Assessment & Plan  · Planned for surgical repair 1/2/2020  Attending to officially clear for surgery  Will hold dvt ppx tomorrow in AM per ortho  · CT femur R w/o contrast 12/31: Obliquely oriented almost nondisplaced proximal femoral shaft fracture around the site of the femoral stem without significant displacement of the hardware  Moderate amount of soft tissue calcification in the vicinity of the injury  Diminished density of the vastus intermedius muscle probably representing some muscular edema in the deep compartment  No dislocation at the hip  · Continue pain regimen with scheduled Tylenol, p r n  Oxycodone and IV dilaudid for breakthrough pain  · Gabapentin t i d   · PT/OT evaluation recommend rehab choices made and submitted once medically clear from ortho standpoint       [facility], [facility]  Assessment & Plan  · Continue Seroquel at bedtime  · Geodon b i d   · Ativan p r n      Iron deficiency anemia secondary to inadequate dietary iron intake  Assessment & Plan  · Continue iron supplementation    Chronic bilateral low back pain without sciatica  Assessment & Plan  · Continue Tylenol, oxycodone, dilaudid for breakthrough  · Gabapentin t i d  · PT/OT    ABIMAEL (generalized anxiety disorder)  Assessment & Plan  · Continue home medications:  · Lorazepam p r n, paroxetine daily, Geodon b i d, Seroquel at bedtime    Acquired hypothyroidism  Assessment & Plan  · Continue daily levothyroxine    Localized osteoporosis with current pathological fracture with routine healing  Assessment & Plan  · Vitamin-D supplementation  · Outpatient endocrine follow-up    Hyponatremia  Assessment & Plan  · Will monitor  · Psychiatric meds and pain will contribute to hyponatremia  · Na has remained stable       VTE Pharmacologic Prophylaxis:   Pharmacologic: Enoxaparin (Lovenox)  Mechanical VTE Prophylaxis in Place: Yes    Patient Centered Rounds: I have performed bedside rounds with nursing staff today  Discussions with Specialists or Other Care Team Provider: appreciate ortho  D/w my attending Dr Tami Trammell to medically clear for surgery    Education and Discussions with Family / Patient: patient  Time Spent for Care: 30 minutes  More than 50% of total time spent on counseling and coordination of care as described above  Current Length of Stay: 3 day(s)    Current Patient Status: Inpatient   Certification Statement: The patient will continue to require additional inpatient hospital stay due to OR 2020    Discharge Plan: will require rehab once ortho procedure completed and medically stable  Code Status: Level 1 - Full Code      Subjective:   Doing better today  Pain better controlled  No other complaints  Objective:     Vitals:   Temp (24hrs), Av 3 °F (36 8 °C), Min:97 8 °F (36 6 °C), Max:99 °F (37 2 °C)    Temp:  [97 8 °F (36 6 °C)-99 °F (37 2 °C)] 98 °F (36 7 °C)  HR:  [] 103  Resp:  [15-18] 15  BP: (115-129)/(77-78) 129/77  SpO2:  [96 %-98 %] 98 %  Body mass index is 27 81 kg/m²  Input and Output Summary (last 24 hours): Intake/Output Summary (Last 24 hours) at 2020 1009  Last data filed at 2020 0910  Gross per 24 hour   Intake 710 ml   Output 5600 ml   Net -4890 ml       Physical Exam:     Physical Exam   Constitutional: She is oriented to person, place, and time  No distress  Cardiovascular:   No murmur heard  Pulmonary/Chest: Effort normal and breath sounds normal    Abdominal: Soft  Bowel sounds are normal  She exhibits no distension  Musculoskeletal: She exhibits tenderness (R hip )  Neurological: She is alert and oriented to person, place, and time  Skin: She is not diaphoretic     Nursing note and vitals reviewed  Additional Data:     Labs:    Results from last 7 days   Lab Units 01/01/20  0542   WBC Thousand/uL 3 18*   HEMOGLOBIN g/dL 11 8   HEMATOCRIT % 36 9   PLATELETS Thousands/uL 362   NEUTROS PCT % 66   LYMPHS PCT % 20   MONOS PCT % 11   EOS PCT % 2     Results from last 7 days   Lab Units 01/01/20  0542   POTASSIUM mmol/L 3 6   CHLORIDE mmol/L 94*   CO2 mmol/L 34*   BUN mg/dL 8   CREATININE mg/dL 0 58*   CALCIUM mg/dL 9 7           * I Have Reviewed All Lab Data Listed Above  * Additional Pertinent Lab Tests Reviewed:  All Labs Within Last 24 Hours Reviewed    Imaging:    Imaging Reports Reviewed Today Include: all  Imaging Personally Reviewed by Myself Includes:  none    Recent Cultures (last 7 days):           Last 24 Hours Medication List:     Current Facility-Administered Medications:  acetaminophen 975 mg Oral Q8H [address] Omar Flores DO   aluminum-magnesium hydroxide-simethicone 30 mL Oral Q4H PRN Doretha Kayser, CRNP   ascorbic acid 500 mg Oral BID Chidi Wilson, DO   calcium carbonate 1 tablet Oral BID With Meals Chidi Wilson,    calcium carbonate 500 mg Oral Daily PRN Doretha Kayser, CRNP   cholecalciferol 2,000 Units Oral Daily Jaquelin Henrique Flores,    docusate sodium 100 mg Oral BID Chidi Wilson DO   [START ON 1/3/2020] enoxaparin 40 mg Subcutaneous Q24H [address] Barbara Lord PA-C   ferrous sulfate 325 mg Oral BID With Meals Chidi Wilson,    folic acid 1 mg Oral Daily Jaquelin Henrique Flores, DO   gabapentin 100 mg Oral TID Chidi Wilson, DO   HYDROmorphone 0 2 mg Intravenous Q4H PRN Cl[facility]gil[facility]   levothyroxine 25 mcg Oral Early Morning Jaquelin Henrique Flores, DO   lidocaine 2 patch Topical Daily Agnieszka Rogers MD   LORazepam 2 mg Oral Q8H PRN Chidi Martinxon, DO   methocarbamol 750 mg Oral Q6H [address] Chidi Wilson, DO   multivitamin-minerals 1 tablet Oral Daily Jaquelin Henrique Flores,    oxyCODONE 5 mg Oral Q4H PRN ABENA Allen   oxyCODONE 10 mg Oral Q4H PRN Domingo Braver, DO   pantoprazole 40 mg Oral BID AC Chestine Nettle Veres, DO   PARoxetine 60 mg Oral QAM Chestine Nettle Veres, DO   QUEtiapine 400 mg Oral HS Chestine Nettle Veres, DO   ziprasidone 80 mg Oral BID Domingo Crain, DO        Today, Patient Was Seen By: Mortimer Barrs, PA-C    ** Please Note: Dictation voice to text software may have been used in the creation of this document   **

## 2020-01-02 ENCOUNTER — APPOINTMENT (INPATIENT)
Dept: RADIOLOGY | Facility: HOSPITAL | Age: 60
DRG: 466 | End: 2020-01-02
Payer: COMMERCIAL

## 2020-01-02 ENCOUNTER — ANESTHESIA (INPATIENT)
Dept: PERIOP | Facility: HOSPITAL | Age: 60
DRG: 466 | End: 2020-01-02
Payer: COMMERCIAL

## 2020-01-02 PROBLEM — R06.02 SOB (SHORTNESS OF BREATH): Status: ACTIVE | Noted: 2020-01-02

## 2020-01-02 PROBLEM — S72.001A CLOSED RIGHT HIP FRACTURE (HCC): Status: ACTIVE | Noted: 2019-12-27

## 2020-01-02 LAB
ABO GROUP BLD: NORMAL
ANION GAP SERPL CALCULATED.3IONS-SCNC: 4 MMOL/L (ref 4–13)
ANION GAP SERPL CALCULATED.3IONS-SCNC: 7 MMOL/L (ref 4–13)
APTT PPP: 34 SECONDS (ref 23–37)
BASE EXCESS BLDA CALC-SCNC: -4 MMOL/L (ref -2–3)
BASE EXCESS BLDA CALC-SCNC: 0.9 MMOL/L
BASE EXCESS BLDA CALC-SCNC: 1 MMOL/L (ref -2–3)
BASE EXCESS BLDA CALC-SCNC: 2 MMOL/L (ref -2–3)
BASE EXCESS BLDA CALC-SCNC: 2 MMOL/L (ref -2–3)
BASOPHILS # BLD AUTO: 0.02 THOUSANDS/ΜL (ref 0–0.1)
BASOPHILS NFR BLD AUTO: 1 % (ref 0–1)
BLD GP AB SCN SERPL QL: NEGATIVE
BUN SERPL-MCNC: 8 MG/DL (ref 5–25)
BUN SERPL-MCNC: 9 MG/DL (ref 5–25)
CA-I BLD-SCNC: 0.94 MMOL/L (ref 1.12–1.32)
CA-I BLD-SCNC: 1.1 MMOL/L (ref 1.12–1.32)
CA-I BLD-SCNC: 1.12 MMOL/L (ref 1.12–1.32)
CA-I BLD-SCNC: 1.12 MMOL/L (ref 1.12–1.32)
CALCIUM SERPL-MCNC: 8.8 MG/DL (ref 8.3–10.1)
CALCIUM SERPL-MCNC: 9.9 MG/DL (ref 8.3–10.1)
CHLORIDE SERPL-SCNC: 105 MMOL/L (ref 100–108)
CHLORIDE SERPL-SCNC: 96 MMOL/L (ref 100–108)
CO2 SERPL-SCNC: 24 MMOL/L (ref 21–32)
CO2 SERPL-SCNC: 34 MMOL/L (ref 21–32)
CREAT SERPL-MCNC: 0.6 MG/DL (ref 0.6–1.3)
CREAT SERPL-MCNC: 0.71 MG/DL (ref 0.6–1.3)
D DIMER PPP FEU-MCNC: 2.78 UG/ML FEU
EOSINOPHIL # BLD AUTO: 0.08 THOUSAND/ΜL (ref 0–0.61)
EOSINOPHIL NFR BLD AUTO: 2 % (ref 0–6)
ERYTHROCYTE [DISTWIDTH] IN BLOOD BY AUTOMATED COUNT: 17.2 % (ref 11.6–15.1)
ERYTHROCYTE [DISTWIDTH] IN BLOOD BY AUTOMATED COUNT: 19 % (ref 11.6–15.1)
GFR SERPL CREATININE-BSD FRML MDRD: 100 ML/MIN/1.73SQ M
GFR SERPL CREATININE-BSD FRML MDRD: 94 ML/MIN/1.73SQ M
GLUCOSE SERPL-MCNC: 119 MG/DL (ref 65–140)
GLUCOSE SERPL-MCNC: 153 MG/DL (ref 65–140)
GLUCOSE SERPL-MCNC: 155 MG/DL (ref 65–140)
GLUCOSE SERPL-MCNC: 159 MG/DL (ref 65–140)
GLUCOSE SERPL-MCNC: 162 MG/DL (ref 65–140)
GLUCOSE SERPL-MCNC: 82 MG/DL (ref 65–140)
GLUCOSE SERPL-MCNC: 84 MG/DL (ref 65–140)
HCO3 BLDA-SCNC: 19.3 MMOL/L (ref 22–28)
HCO3 BLDA-SCNC: 23.1 MMOL/L (ref 22–28)
HCO3 BLDA-SCNC: 25.9 MMOL/L (ref 22–28)
HCO3 BLDA-SCNC: 26.4 MMOL/L (ref 22–28)
HCO3 BLDA-SCNC: 26.5 MMOL/L (ref 22–28)
HCT VFR BLD AUTO: 35.5 % (ref 34.8–46.1)
HCT VFR BLD AUTO: 36.3 % (ref 34.8–46.1)
HCT VFR BLD CALC: 17 % (ref 34.8–46.1)
HCT VFR BLD CALC: 26 % (ref 34.8–46.1)
HCT VFR BLD CALC: 31 % (ref 34.8–46.1)
HCT VFR BLD CALC: 32 % (ref 34.8–46.1)
HGB BLD-MCNC: 11.6 G/DL (ref 11.5–15.4)
HGB BLD-MCNC: 11.6 G/DL (ref 11.5–15.4)
HGB BLDA-MCNC: 10.5 G/DL (ref 11.5–15.4)
HGB BLDA-MCNC: 10.9 G/DL (ref 11.5–15.4)
HGB BLDA-MCNC: 5.8 G/DL (ref 11.5–15.4)
HGB BLDA-MCNC: 8.8 G/DL (ref 11.5–15.4)
IMM GRANULOCYTES # BLD AUTO: 0.01 THOUSAND/UL (ref 0–0.2)
IMM GRANULOCYTES NFR BLD AUTO: 0 % (ref 0–2)
INR PPP: 1.01 (ref 0.84–1.19)
LYMPHOCYTES # BLD AUTO: 0.87 THOUSANDS/ΜL (ref 0.6–4.47)
LYMPHOCYTES NFR BLD AUTO: 26 % (ref 14–44)
MCH RBC QN AUTO: 28.3 PG (ref 26.8–34.3)
MCH RBC QN AUTO: 28.4 PG (ref 26.8–34.3)
MCHC RBC AUTO-ENTMCNC: 32 G/DL (ref 31.4–37.4)
MCHC RBC AUTO-ENTMCNC: 32.7 G/DL (ref 31.4–37.4)
MCV RBC AUTO: 87 FL (ref 82–98)
MCV RBC AUTO: 89 FL (ref 82–98)
MONOCYTES # BLD AUTO: 0.34 THOUSAND/ΜL (ref 0.17–1.22)
MONOCYTES NFR BLD AUTO: 10 % (ref 4–12)
NEUTROPHILS # BLD AUTO: 2.06 THOUSANDS/ΜL (ref 1.85–7.62)
NEUTS SEG NFR BLD AUTO: 61 % (ref 43–75)
NRBC BLD AUTO-RTO: 0 /100 WBCS
O2 CT BLDA-SCNC: 14.2 ML/DL (ref 16–23)
OXYHGB MFR BLDA: 97.4 % (ref 94–97)
PCO2 BLD: 20 MMOL/L (ref 21–32)
PCO2 BLD: 27 MMOL/L (ref 21–32)
PCO2 BLD: 28 MMOL/L (ref 21–32)
PCO2 BLD: 28 MMOL/L (ref 21–32)
PCO2 BLD: 28.5 MM HG (ref 36–44)
PCO2 BLD: 39.6 MM HG (ref 36–44)
PCO2 BLD: 39.7 MM HG (ref 36–44)
PCO2 BLD: 40.4 MM HG (ref 36–44)
PCO2 BLDA: 28.8 MM HG (ref 36–44)
PH BLD: 7.41 [PH] (ref 7.35–7.45)
PH BLD: 7.43 [PH] (ref 7.35–7.45)
PH BLD: 7.43 [PH] (ref 7.35–7.45)
PH BLD: 7.44 [PH] (ref 7.35–7.45)
PH BLDA: 7.52 [PH] (ref 7.35–7.45)
PLATELET # BLD AUTO: 367 THOUSANDS/UL (ref 149–390)
PLATELET # BLD AUTO: 429 THOUSANDS/UL (ref 149–390)
PMV BLD AUTO: 8.1 FL (ref 8.9–12.7)
PMV BLD AUTO: 8.4 FL (ref 8.9–12.7)
PO2 BLD: 196 MM HG (ref 75–129)
PO2 BLD: 209 MM HG (ref 75–129)
PO2 BLD: 227 MM HG (ref 75–129)
PO2 BLD: 228 MM HG (ref 75–129)
PO2 BLDA: 112.1 MM HG (ref 75–129)
POTASSIUM BLD-SCNC: 2.4 MMOL/L (ref 3.5–5.3)
POTASSIUM BLD-SCNC: 3.6 MMOL/L (ref 3.5–5.3)
POTASSIUM BLD-SCNC: 4.1 MMOL/L (ref 3.5–5.3)
POTASSIUM BLD-SCNC: 4.2 MMOL/L (ref 3.5–5.3)
POTASSIUM SERPL-SCNC: 4.2 MMOL/L (ref 3.5–5.3)
POTASSIUM SERPL-SCNC: 4.3 MMOL/L (ref 3.5–5.3)
PROTHROMBIN TIME: 12.9 SECONDS (ref 11.6–14.5)
RBC # BLD AUTO: 4.08 MILLION/UL (ref 3.81–5.12)
RBC # BLD AUTO: 4.1 MILLION/UL (ref 3.81–5.12)
RH BLD: POSITIVE
SAO2 % BLD FROM PO2: 100 % (ref 60–85)
SODIUM BLD-SCNC: 131 MMOL/L (ref 136–145)
SODIUM BLD-SCNC: 131 MMOL/L (ref 136–145)
SODIUM BLD-SCNC: 132 MMOL/L (ref 136–145)
SODIUM BLD-SCNC: 139 MMOL/L (ref 136–145)
SODIUM SERPL-SCNC: 134 MMOL/L (ref 136–145)
SODIUM SERPL-SCNC: 136 MMOL/L (ref 136–145)
SPECIMEN EXPIRATION DATE: NORMAL
SPECIMEN SOURCE: ABNORMAL
WBC # BLD AUTO: 11.99 THOUSAND/UL (ref 4.31–10.16)
WBC # BLD AUTO: 3.38 THOUSAND/UL (ref 4.31–10.16)

## 2020-01-02 PROCEDURE — 99232 SBSQ HOSP IP/OBS MODERATE 35: CPT | Performed by: PHYSICIAN ASSISTANT

## 2020-01-02 PROCEDURE — 86900 BLOOD TYPING SEROLOGIC ABO: CPT | Performed by: ORTHOPAEDIC SURGERY

## 2020-01-02 PROCEDURE — 85027 COMPLETE CBC AUTOMATED: CPT | Performed by: PHYSICIAN ASSISTANT

## 2020-01-02 PROCEDURE — P9016 RBC LEUKOCYTES REDUCED: HCPCS

## 2020-01-02 PROCEDURE — 99221 1ST HOSP IP/OBS SF/LOW 40: CPT | Performed by: INTERNAL MEDICINE

## 2020-01-02 PROCEDURE — 30233N1 TRANSFUSION OF NONAUTOLOGOUS RED BLOOD CELLS INTO PERIPHERAL VEIN, PERCUTANEOUS APPROACH: ICD-10-PCS | Performed by: ORTHOPAEDIC SURGERY

## 2020-01-02 PROCEDURE — 0SRR03Z REPLACEMENT OF RIGHT HIP JOINT, FEMORAL SURFACE WITH CERAMIC SYNTHETIC SUBSTITUTE, OPEN APPROACH: ICD-10-PCS | Performed by: ORTHOPAEDIC SURGERY

## 2020-01-02 PROCEDURE — 80048 BASIC METABOLIC PNL TOTAL CA: CPT | Performed by: PHYSICIAN ASSISTANT

## 2020-01-02 PROCEDURE — C1713 ANCHOR/SCREW BN/BN,TIS/BN: HCPCS | Performed by: ORTHOPAEDIC SURGERY

## 2020-01-02 PROCEDURE — 85730 THROMBOPLASTIN TIME PARTIAL: CPT | Performed by: ORTHOPAEDIC SURGERY

## 2020-01-02 PROCEDURE — C1776 JOINT DEVICE (IMPLANTABLE): HCPCS | Performed by: ORTHOPAEDIC SURGERY

## 2020-01-02 PROCEDURE — 82805 BLOOD GASES W/O2 SATURATION: CPT | Performed by: PHYSICIAN ASSISTANT

## 2020-01-02 PROCEDURE — C1769 GUIDE WIRE: HCPCS | Performed by: ORTHOPAEDIC SURGERY

## 2020-01-02 PROCEDURE — 82947 ASSAY GLUCOSE BLOOD QUANT: CPT

## 2020-01-02 PROCEDURE — NS001 PR NO SIGNATURE OR ATTESTATION: Performed by: ORTHOPAEDIC SURGERY

## 2020-01-02 PROCEDURE — 86923 COMPATIBILITY TEST ELECTRIC: CPT

## 2020-01-02 PROCEDURE — 86901 BLOOD TYPING SEROLOGIC RH(D): CPT | Performed by: ORTHOPAEDIC SURGERY

## 2020-01-02 PROCEDURE — 84132 ASSAY OF SERUM POTASSIUM: CPT

## 2020-01-02 PROCEDURE — 71045 X-RAY EXAM CHEST 1 VIEW: CPT

## 2020-01-02 PROCEDURE — 85379 FIBRIN DEGRADATION QUANT: CPT | Performed by: ANESTHESIOLOGY

## 2020-01-02 PROCEDURE — 27138 REVISE HIP JOINT REPLACEMENT: CPT | Performed by: ORTHOPAEDIC SURGERY

## 2020-01-02 PROCEDURE — 85025 COMPLETE CBC W/AUTO DIFF WBC: CPT | Performed by: INTERNAL MEDICINE

## 2020-01-02 PROCEDURE — 86850 RBC ANTIBODY SCREEN: CPT | Performed by: ORTHOPAEDIC SURGERY

## 2020-01-02 PROCEDURE — 27507 TREATMENT OF THIGH FRACTURE: CPT | Performed by: ORTHOPAEDIC SURGERY

## 2020-01-02 PROCEDURE — 85014 HEMATOCRIT: CPT

## 2020-01-02 PROCEDURE — 82330 ASSAY OF CALCIUM: CPT

## 2020-01-02 PROCEDURE — 84295 ASSAY OF SERUM SODIUM: CPT

## 2020-01-02 PROCEDURE — 80048 BASIC METABOLIC PNL TOTAL CA: CPT | Performed by: INTERNAL MEDICINE

## 2020-01-02 PROCEDURE — 82803 BLOOD GASES ANY COMBINATION: CPT

## 2020-01-02 PROCEDURE — 0QS804Z REPOSITION RIGHT FEMORAL SHAFT WITH INTERNAL FIXATION DEVICE, OPEN APPROACH: ICD-10-PCS | Performed by: ORTHOPAEDIC SURGERY

## 2020-01-02 PROCEDURE — 85610 PROTHROMBIN TIME: CPT | Performed by: INTERNAL MEDICINE

## 2020-01-02 PROCEDURE — 73502 X-RAY EXAM HIP UNI 2-3 VIEWS: CPT

## 2020-01-02 PROCEDURE — 0SPR0JZ REMOVAL OF SYNTHETIC SUBSTITUTE FROM RIGHT HIP JOINT, FEMORAL SURFACE, OPEN APPROACH: ICD-10-PCS | Performed by: ORTHOPAEDIC SURGERY

## 2020-01-02 PROCEDURE — 82948 REAGENT STRIP/BLOOD GLUCOSE: CPT

## 2020-01-02 PROCEDURE — 93005 ELECTROCARDIOGRAM TRACING: CPT

## 2020-01-02 DEVICE — CONNECTING SCREW F/LCKNG ATTACHMENT PL/STARDRIVE-STER: Type: IMPLANTABLE DEVICE | Site: HIP | Status: FUNCTIONAL

## 2020-01-02 DEVICE — 4.5MM LCP® PROXIMAL FEMUR HOOK PLATE 10 HOLES/277MM
Type: IMPLANTABLE DEVICE | Site: HIP | Status: FUNCTIONAL
Brand: LCP

## 2020-01-02 DEVICE — 3.5MM LOCKING ATTACHMENT PLATE F/4.5MM PROX FEMUR PL/8H-STER: Type: IMPLANTABLE DEVICE | Site: HIP | Status: FUNCTIONAL

## 2020-01-02 DEVICE — 5.0MM CANNULATED LOCKING SCREW 25MM: Type: IMPLANTABLE DEVICE | Site: HIP | Status: FUNCTIONAL

## 2020-01-02 DEVICE — BIOLOX DELTA CERAMIC FEMORAL HEAD 32MM DIA +5.0 12/14 TAPER
Type: IMPLANTABLE DEVICE | Site: HIP | Status: FUNCTIONAL
Brand: BIOLOX DELTA

## 2020-01-02 DEVICE — 1.7MM COCR CABLE WITH TI CRIMP 750MM-STERILE: Type: IMPLANTABLE DEVICE | Site: HIP | Status: FUNCTIONAL

## 2020-01-02 DEVICE — 4.5MM THREADED CERCLAGE POSITIONING PIN-STERILE: Type: IMPLANTABLE DEVICE | Site: HIP | Status: FUNCTIONAL

## 2020-01-02 DEVICE — 4.5MM CORTEX SCREW SELF-TAPPING 32MM: Type: IMPLANTABLE DEVICE | Site: HIP | Status: FUNCTIONAL

## 2020-01-02 DEVICE — 4.5MM CORTEX SCREW SELF-TAPPING 36MM: Type: IMPLANTABLE DEVICE | Site: HIP | Status: FUNCTIONAL

## 2020-01-02 DEVICE — 1.7MM CABLE WITH CRIMP 750MM-STERILE: Type: IMPLANTABLE DEVICE | Site: HIP | Status: FUNCTIONAL

## 2020-01-02 DEVICE — 3.5MM LOCKING ATTACHMENT PLATE F/4.5MM PROX FEMUR PL/4H-STER: Type: IMPLANTABLE DEVICE | Site: HIP | Status: FUNCTIONAL

## 2020-01-02 DEVICE — IMPLANTABLE DEVICE: Type: IMPLANTABLE DEVICE | Site: HIP | Status: FUNCTIONAL

## 2020-01-02 RX ORDER — ALBUMIN, HUMAN INJ 5% 5 %
12.5 SOLUTION INTRAVENOUS ONCE
Status: COMPLETED | OUTPATIENT
Start: 2020-01-02 | End: 2020-01-02

## 2020-01-02 RX ORDER — HYDROMORPHONE HCL/PF 1 MG/ML
0.5 SYRINGE (ML) INJECTION
Status: DISCONTINUED | OUTPATIENT
Start: 2020-01-02 | End: 2020-01-02 | Stop reason: HOSPADM

## 2020-01-02 RX ORDER — FENTANYL CITRATE/PF 50 MCG/ML
50 SYRINGE (ML) INJECTION
Status: DISCONTINUED | OUTPATIENT
Start: 2020-01-02 | End: 2020-01-02 | Stop reason: HOSPADM

## 2020-01-02 RX ORDER — ONDANSETRON 2 MG/ML
4 INJECTION INTRAMUSCULAR; INTRAVENOUS ONCE AS NEEDED
Status: DISCONTINUED | OUTPATIENT
Start: 2020-01-02 | End: 2020-01-02 | Stop reason: HOSPADM

## 2020-01-02 RX ORDER — ROCURONIUM BROMIDE 10 MG/ML
INJECTION, SOLUTION INTRAVENOUS AS NEEDED
Status: DISCONTINUED | OUTPATIENT
Start: 2020-01-02 | End: 2020-01-02 | Stop reason: SURG

## 2020-01-02 RX ORDER — ALBUMIN, HUMAN INJ 5% 5 %
SOLUTION INTRAVENOUS CONTINUOUS PRN
Status: DISCONTINUED | OUTPATIENT
Start: 2020-01-02 | End: 2020-01-02 | Stop reason: SURG

## 2020-01-02 RX ORDER — OXYCODONE HYDROCHLORIDE 5 MG/1
5 TABLET ORAL ONCE
Status: COMPLETED | OUTPATIENT
Start: 2020-01-02 | End: 2020-01-02

## 2020-01-02 RX ORDER — GLYCOPYRROLATE 0.2 MG/ML
INJECTION INTRAMUSCULAR; INTRAVENOUS AS NEEDED
Status: DISCONTINUED | OUTPATIENT
Start: 2020-01-02 | End: 2020-01-02 | Stop reason: SURG

## 2020-01-02 RX ORDER — PROMETHAZINE HYDROCHLORIDE 25 MG/ML
12.5 INJECTION, SOLUTION INTRAMUSCULAR; INTRAVENOUS ONCE AS NEEDED
Status: DISCONTINUED | OUTPATIENT
Start: 2020-01-02 | End: 2020-01-02 | Stop reason: HOSPADM

## 2020-01-02 RX ORDER — SODIUM CHLORIDE, SODIUM LACTATE, POTASSIUM CHLORIDE, CALCIUM CHLORIDE 600; 310; 30; 20 MG/100ML; MG/100ML; MG/100ML; MG/100ML
75 INJECTION, SOLUTION INTRAVENOUS CONTINUOUS
Status: DISCONTINUED | OUTPATIENT
Start: 2020-01-02 | End: 2020-01-03

## 2020-01-02 RX ORDER — CEFAZOLIN SODIUM 2 G/50ML
2000 SOLUTION INTRAVENOUS
Status: DISCONTINUED | OUTPATIENT
Start: 2020-01-02 | End: 2020-01-02 | Stop reason: HOSPADM

## 2020-01-02 RX ORDER — TRANEXAMIC ACID 100 MG/ML
INJECTION, SOLUTION INTRAVENOUS AS NEEDED
Status: DISCONTINUED | OUTPATIENT
Start: 2020-01-02 | End: 2020-01-02 | Stop reason: SURG

## 2020-01-02 RX ORDER — CEFAZOLIN SODIUM 2 G/50ML
SOLUTION INTRAVENOUS AS NEEDED
Status: DISCONTINUED | OUTPATIENT
Start: 2020-01-02 | End: 2020-01-02 | Stop reason: SURG

## 2020-01-02 RX ORDER — DEXAMETHASONE SODIUM PHOSPHATE 10 MG/ML
INJECTION, SOLUTION INTRAMUSCULAR; INTRAVENOUS AS NEEDED
Status: DISCONTINUED | OUTPATIENT
Start: 2020-01-02 | End: 2020-01-02 | Stop reason: SURG

## 2020-01-02 RX ORDER — SODIUM CHLORIDE 9 MG/ML
INJECTION, SOLUTION INTRAVENOUS CONTINUOUS PRN
Status: DISCONTINUED | OUTPATIENT
Start: 2020-01-02 | End: 2020-01-02 | Stop reason: SURG

## 2020-01-02 RX ORDER — NEOSTIGMINE METHYLSULFATE 1 MG/ML
INJECTION INTRAVENOUS AS NEEDED
Status: DISCONTINUED | OUTPATIENT
Start: 2020-01-02 | End: 2020-01-02 | Stop reason: SURG

## 2020-01-02 RX ORDER — LIDOCAINE HYDROCHLORIDE 10 MG/ML
INJECTION, SOLUTION EPIDURAL; INFILTRATION; INTRACAUDAL; PERINEURAL AS NEEDED
Status: DISCONTINUED | OUTPATIENT
Start: 2020-01-02 | End: 2020-01-02 | Stop reason: SURG

## 2020-01-02 RX ORDER — FENTANYL CITRATE 50 UG/ML
INJECTION, SOLUTION INTRAMUSCULAR; INTRAVENOUS AS NEEDED
Status: DISCONTINUED | OUTPATIENT
Start: 2020-01-02 | End: 2020-01-02 | Stop reason: SURG

## 2020-01-02 RX ORDER — MIDAZOLAM HYDROCHLORIDE 2 MG/2ML
INJECTION, SOLUTION INTRAMUSCULAR; INTRAVENOUS AS NEEDED
Status: DISCONTINUED | OUTPATIENT
Start: 2020-01-02 | End: 2020-01-02 | Stop reason: SURG

## 2020-01-02 RX ORDER — PROPOFOL 10 MG/ML
INJECTION, EMULSION INTRAVENOUS AS NEEDED
Status: DISCONTINUED | OUTPATIENT
Start: 2020-01-02 | End: 2020-01-02 | Stop reason: SURG

## 2020-01-02 RX ORDER — LORAZEPAM 2 MG/ML
0.5 INJECTION INTRAMUSCULAR ONCE
Status: COMPLETED | OUTPATIENT
Start: 2020-01-02 | End: 2020-01-02

## 2020-01-02 RX ORDER — ESMOLOL HYDROCHLORIDE 10 MG/ML
INJECTION INTRAVENOUS AS NEEDED
Status: DISCONTINUED | OUTPATIENT
Start: 2020-01-02 | End: 2020-01-02 | Stop reason: SURG

## 2020-01-02 RX ORDER — MAGNESIUM HYDROXIDE 1200 MG/15ML
LIQUID ORAL AS NEEDED
Status: DISCONTINUED | OUTPATIENT
Start: 2020-01-02 | End: 2020-01-02 | Stop reason: HOSPADM

## 2020-01-02 RX ORDER — CEFAZOLIN SODIUM 1 G/50ML
1000 SOLUTION INTRAVENOUS EVERY 8 HOURS
Status: DISCONTINUED | OUTPATIENT
Start: 2020-01-03 | End: 2020-01-03

## 2020-01-02 RX ORDER — HYDROMORPHONE HCL/PF 1 MG/ML
SYRINGE (ML) INJECTION AS NEEDED
Status: DISCONTINUED | OUTPATIENT
Start: 2020-01-02 | End: 2020-01-02 | Stop reason: SURG

## 2020-01-02 RX ORDER — ONDANSETRON 2 MG/ML
INJECTION INTRAMUSCULAR; INTRAVENOUS AS NEEDED
Status: DISCONTINUED | OUTPATIENT
Start: 2020-01-02 | End: 2020-01-02 | Stop reason: SURG

## 2020-01-02 RX ADMIN — FENTANYL CITRATE 50 MCG: 50 INJECTION, SOLUTION INTRAMUSCULAR; INTRAVENOUS at 17:34

## 2020-01-02 RX ADMIN — SODIUM CHLORIDE: 0.9 INJECTION, SOLUTION INTRAVENOUS at 14:24

## 2020-01-02 RX ADMIN — NEOSTIGMINE METHYLSULFATE 3 MG: 1 INJECTION INTRAVENOUS at 18:44

## 2020-01-02 RX ADMIN — ROCURONIUM BROMIDE 20 MG: 50 INJECTION, SOLUTION INTRAVENOUS at 17:07

## 2020-01-02 RX ADMIN — ACETAMINOPHEN 975 MG: 325 TABLET ORAL at 13:02

## 2020-01-02 RX ADMIN — GLYCOPYRROLATE 0.6 MG: 0.2 INJECTION, SOLUTION INTRAMUSCULAR; INTRAVENOUS at 18:44

## 2020-01-02 RX ADMIN — SODIUM CHLORIDE, SODIUM LACTATE, POTASSIUM CHLORIDE, AND CALCIUM CHLORIDE: .6; .31; .03; .02 INJECTION, SOLUTION INTRAVENOUS at 17:12

## 2020-01-02 RX ADMIN — LORAZEPAM 0.5 MG: 2 INJECTION INTRAMUSCULAR; INTRAVENOUS at 23:18

## 2020-01-02 RX ADMIN — PAROXETINE 60 MG: 20 TABLET, FILM COATED ORAL at 07:54

## 2020-01-02 RX ADMIN — ROCURONIUM BROMIDE 10 MG: 50 INJECTION, SOLUTION INTRAVENOUS at 15:07

## 2020-01-02 RX ADMIN — FOLIC ACID 1 MG: 1 TABLET ORAL at 07:55

## 2020-01-02 RX ADMIN — ONDANSETRON 4 MG: 2 INJECTION INTRAMUSCULAR; INTRAVENOUS at 18:15

## 2020-01-02 RX ADMIN — ROCURONIUM BROMIDE 20 MG: 50 INJECTION, SOLUTION INTRAVENOUS at 15:24

## 2020-01-02 RX ADMIN — FENTANYL CITRATE 50 MCG: 50 INJECTION, SOLUTION INTRAMUSCULAR; INTRAVENOUS at 16:34

## 2020-01-02 RX ADMIN — CALCIUM 1 TABLET: 500 TABLET ORAL at 04:54

## 2020-01-02 RX ADMIN — ROCURONIUM BROMIDE 10 MG: 50 INJECTION, SOLUTION INTRAVENOUS at 16:22

## 2020-01-02 RX ADMIN — PHENYLEPHRINE HYDROCHLORIDE 50 MCG: 10 INJECTION INTRAVENOUS at 15:36

## 2020-01-02 RX ADMIN — LEVOTHYROXINE SODIUM 25 MCG: 25 TABLET ORAL at 04:52

## 2020-01-02 RX ADMIN — ALBUMIN (HUMAN) 12.5 G: 12.5 INJECTION, SOLUTION INTRAVENOUS at 19:51

## 2020-01-02 RX ADMIN — METHOCARBAMOL 750 MG: 750 TABLET, FILM COATED ORAL at 13:07

## 2020-01-02 RX ADMIN — ROCURONIUM BROMIDE 50 MG: 50 INJECTION, SOLUTION INTRAVENOUS at 14:16

## 2020-01-02 RX ADMIN — LORAZEPAM 2 MG: 1 TABLET ORAL at 05:50

## 2020-01-02 RX ADMIN — FERROUS SULFATE TAB 325 MG (65 MG ELEMENTAL FE) 325 MG: 325 (65 FE) TAB at 04:51

## 2020-01-02 RX ADMIN — HYDROMORPHONE HYDROCHLORIDE 0.5 MG: 1 INJECTION, SOLUTION INTRAMUSCULAR; INTRAVENOUS; SUBCUTANEOUS at 18:18

## 2020-01-02 RX ADMIN — ACETAMINOPHEN 975 MG: 325 TABLET ORAL at 04:50

## 2020-01-02 RX ADMIN — OXYCODONE HYDROCHLORIDE 5 MG: 5 TABLET ORAL at 07:55

## 2020-01-02 RX ADMIN — Medication 1 TABLET: at 07:52

## 2020-01-02 RX ADMIN — PHENYLEPHRINE HYDROCHLORIDE 100 MCG: 10 INJECTION INTRAVENOUS at 16:39

## 2020-01-02 RX ADMIN — PANTOPRAZOLE SODIUM 40 MG: 20 TABLET, DELAYED RELEASE ORAL at 04:53

## 2020-01-02 RX ADMIN — FENTANYL CITRATE 50 MCG: 50 INJECTION, SOLUTION INTRAMUSCULAR; INTRAVENOUS at 18:41

## 2020-01-02 RX ADMIN — ZIPRASIDONE HCL 80 MG: 40 CAPSULE ORAL at 07:56

## 2020-01-02 RX ADMIN — ESMOLOL HYDROCHLORIDE 40 MG: 100 INJECTION, SOLUTION INTRAVENOUS at 19:06

## 2020-01-02 RX ADMIN — METHOCARBAMOL 750 MG: 750 TABLET, FILM COATED ORAL at 04:52

## 2020-01-02 RX ADMIN — HYDROMORPHONE HYDROCHLORIDE 0.5 MG: 1 INJECTION, SOLUTION INTRAMUSCULAR; INTRAVENOUS; SUBCUTANEOUS at 21:38

## 2020-01-02 RX ADMIN — HYDROMORPHONE HYDROCHLORIDE 0.5 MG: 1 INJECTION, SOLUTION INTRAMUSCULAR; INTRAVENOUS; SUBCUTANEOUS at 20:18

## 2020-01-02 RX ADMIN — LIDOCAINE HYDROCHLORIDE 50 MG: 10 INJECTION, SOLUTION EPIDURAL; INFILTRATION; INTRACAUDAL; PERINEURAL at 14:16

## 2020-01-02 RX ADMIN — CEFAZOLIN SODIUM 2000 MG: 2 SOLUTION INTRAVENOUS at 14:49

## 2020-01-02 RX ADMIN — HYDROMORPHONE HYDROCHLORIDE 0.5 MG: 1 INJECTION, SOLUTION INTRAMUSCULAR; INTRAVENOUS; SUBCUTANEOUS at 19:13

## 2020-01-02 RX ADMIN — SODIUM CHLORIDE, SODIUM LACTATE, POTASSIUM CHLORIDE, AND CALCIUM CHLORIDE 75 ML/HR: .6; .31; .03; .02 INJECTION, SOLUTION INTRAVENOUS at 00:00

## 2020-01-02 RX ADMIN — OXYCODONE HYDROCHLORIDE AND ACETAMINOPHEN 500 MG: 500 TABLET ORAL at 07:59

## 2020-01-02 RX ADMIN — FENTANYL CITRATE 50 MCG: 50 INJECTION, SOLUTION INTRAMUSCULAR; INTRAVENOUS at 14:17

## 2020-01-02 RX ADMIN — MELATONIN 2000 UNITS: at 07:53

## 2020-01-02 RX ADMIN — MIDAZOLAM 2 MG: 1 INJECTION INTRAMUSCULAR; INTRAVENOUS at 14:12

## 2020-01-02 RX ADMIN — FENTANYL CITRATE 50 MCG: 50 INJECTION, SOLUTION INTRAMUSCULAR; INTRAVENOUS at 14:50

## 2020-01-02 RX ADMIN — SODIUM CHLORIDE, SODIUM LACTATE, POTASSIUM CHLORIDE, AND CALCIUM CHLORIDE 500 ML: .6; .31; .03; .02 INJECTION, SOLUTION INTRAVENOUS at 21:03

## 2020-01-02 RX ADMIN — GABAPENTIN 100 MG: 100 CAPSULE ORAL at 07:55

## 2020-01-02 RX ADMIN — HYDROMORPHONE HYDROCHLORIDE 0.5 MG: 1 INJECTION, SOLUTION INTRAMUSCULAR; INTRAVENOUS; SUBCUTANEOUS at 15:20

## 2020-01-02 RX ADMIN — ALBUMIN (HUMAN) 12.5 G: 12.5 INJECTION, SOLUTION INTRAVENOUS at 20:13

## 2020-01-02 RX ADMIN — METHOCARBAMOL 750 MG: 750 TABLET, FILM COATED ORAL at 00:00

## 2020-01-02 RX ADMIN — PHENYLEPHRINE HYDROCHLORIDE 100 MCG: 10 INJECTION INTRAVENOUS at 15:14

## 2020-01-02 RX ADMIN — DEXAMETHASONE SODIUM PHOSPHATE 5 MG: 10 INJECTION, SOLUTION INTRAMUSCULAR; INTRAVENOUS at 15:07

## 2020-01-02 RX ADMIN — FENTANYL CITRATE 50 MCG: 50 INJECTION, SOLUTION INTRAMUSCULAR; INTRAVENOUS at 15:24

## 2020-01-02 RX ADMIN — PROPOFOL 150 MG: 10 INJECTION, EMULSION INTRAVENOUS at 14:16

## 2020-01-02 RX ADMIN — CEFAZOLIN SODIUM 2000 MG: 2 SOLUTION INTRAVENOUS at 18:44

## 2020-01-02 RX ADMIN — HYDROMORPHONE HYDROCHLORIDE 0.5 MG: 1 INJECTION, SOLUTION INTRAMUSCULAR; INTRAVENOUS; SUBCUTANEOUS at 22:30

## 2020-01-02 RX ADMIN — OXYCODONE HYDROCHLORIDE 5 MG: 5 TABLET ORAL at 13:07

## 2020-01-02 RX ADMIN — SODIUM CHLORIDE, SODIUM LACTATE, POTASSIUM CHLORIDE, AND CALCIUM CHLORIDE: .6; .31; .03; .02 INJECTION, SOLUTION INTRAVENOUS at 14:00

## 2020-01-02 RX ADMIN — HYDROMORPHONE HYDROCHLORIDE 0.5 MG: 1 INJECTION, SOLUTION INTRAMUSCULAR; INTRAVENOUS; SUBCUTANEOUS at 21:54

## 2020-01-02 RX ADMIN — SODIUM CHLORIDE: 0.9 INJECTION, SOLUTION INTRAVENOUS at 17:49

## 2020-01-02 RX ADMIN — ALBUMIN (HUMAN): 12.5 SOLUTION INTRAVENOUS at 14:35

## 2020-01-02 RX ADMIN — TRANEXAMIC ACID 1 G: 1 INJECTION, SOLUTION INTRAVENOUS at 15:01

## 2020-01-02 NOTE — UTILIZATION REVIEW
Continued Stay Review    Date: 01/02/2020                        Current Patient Class: Inpatient  Current Level of Care: Med / Surg    HPI:59 y o  female initially admitted on 12/27/2019      Assessment/Plan: NWB RLE  To OR today for revision vs ORIF of the right hip PPfx  Hold anticoagulation today in AM in prep for OR  NPO today  Consented  Clearance/preop risk per SLIM        Pertinent Labs/Diagnostic Results:   Results from last 7 days   Lab Units 01/02/20  0523 01/01/20  0542 12/28/19  0435 12/27/19  2103   WBC Thousand/uL 3 38* 3 18*  --  5 22   HEMOGLOBIN g/dL 11 6 11 8  --  11 4*   HEMATOCRIT % 36 3 36 9  --  34 4*   PLATELETS Thousands/uL 367 362 419* 463*   NEUTROS ABS Thousands/µL 2 06 2 10  --  3 92     Results from last 7 days   Lab Units 01/02/20  0523 01/01/20  0542 12/28/19  0435 12/27/19  2103   SODIUM mmol/L 134* 132* 132* 132*   POTASSIUM mmol/L 4 2 3 6 3 5 3 2*   CHLORIDE mmol/L 96* 94* 97* 94*   CO2 mmol/L 34* 34* 32 33*   ANION GAP mmol/L 4 4 3* 5   BUN mg/dL 9 8 4* 4*   CREATININE mg/dL 0 60 0 58* 0 47* 0 51*   EGFR ml/min/1 73sq m 100 101 108 106   CALCIUM mg/dL 9 9 9 7 9 5 9 1     Results from last 7 days   Lab Units 01/02/20  0523 01/01/20  0542 12/28/19  0435 12/27/19  2103   GLUCOSE RANDOM mg/dL 84 77 93 107     Results from last 7 days   Lab Units 01/02/20  0523   PROTIME seconds 12 9   INR  1 01   PTT seconds 34     Vital Signs: /97   Pulse 97   Temp 98 1 °F (36 7 °C)   Resp 17   Ht 5' 3" (1 6 m)   Wt 71 2 kg (157 lb)   SpO2 98%   BMI 27 81 kg/m²     Medications:   Scheduled Medications:  Medications:  acetaminophen 975 mg Oral Q8H [address]   ascorbic acid 500 mg Oral BID   calcium carbonate 1 tablet Oral BID With Meals   cholecalciferol 2,000 Units Oral Daily   docusate sodium 100 mg Oral BID   [START ON 1/3/2020] enoxaparin 40 mg Subcutaneous Q24H YUMIKO   ferrous sulfate 325 mg Oral BID With Meals   folic acid 1 mg Oral Daily   gabapentin 100 mg Oral TID   levothyroxine 25 mcg Oral Early Morning   lidocaine 2 patch Topical Daily   methocarbamol 750 mg Oral Q6H [address]   multivitamin-minerals 1 tablet Oral Daily   pantoprazole 40 mg Oral BID AC   PARoxetine 60 mg Oral QAM   QUEtiapine 400 mg Oral HS   ziprasidone 80 mg Oral BID   Continuous IV Infusions:  lactated ringers 75 mL/hr Intravenous Continuous   PRN Meds:  aluminum-magnesium hydroxide-simethicone 30 mL Oral Q4H PRN   calcium carbonate 500 mg Oral Daily PRN   HYDROmorphone 0 2 mg Intravenous Q4H PRN   LORazepam 2 mg Oral Q8H PRN   oxyCODONE 5 mg Oral Q4H PRN   oxyCODONE 10 mg Oral Q4H PRN       Discharge Plan: TBD    Network Utilization Review Department  [email or web address]  ATTENTION: Please call with any questions or concerns to [phone] and carefully listen to the prompts so that you are directed to the right person  All voicemails are confidential   Nani Medina all requests for admission clinical reviews, approved or denied determinations and any other requests to dedicated fax number below belonging to the campus where the patient is receiving treatment   List of dedicated fax numbers for the Facilities:  [address] [place] [address] DENIALS (Administrative/Medical Necessity) [phone]   [address] (Maternity/NICU/Pediatrics) [phone]   Lori Courser [phone]   Abhi Thompson [phone]   Corona Devine [phone]   [facility] [phone]   [address] [facility] [address] [facility] [phone]   [facility]  [phone]   [address] [facility] [address]  [address] [facility] [address] [facility] [phone]

## 2020-01-02 NOTE — OP NOTE
OPERATIVE REPORT  PATIENT NAME: Brooks Koenig    :  1960  MRN: [id number]  Pt Location:  OR ROOM 15    SURGERY DATE: 2020    Surgeon(s) and Role:     * Ivanna Lopes MD - Primary     * Celeste Rodriguez PA-C - Assisting     * Marianela Reddy MD - Assisting    Preop Diagnosis:  Closed right hip fracture ([address] ) [S72 001A]  Status post right hip replacement [Z96 641]    Post-Op Diagnosis Codes:     * Closed right hip fracture ([address] ) [S72 001A]     * Status post right hip replacement [Z96 641]    Procedure(s) (LRB):  REVISION TOTAL HIP ARTHROPLASTY WITH REPAIR OF PARIPROSTHETIC FRACTURE - POSTERIOR APPROACH (Right)    Specimen(s):  * No specimens in log *    Estimated Blood Loss:   900 mL    Drains:  Urethral Catheter Non-latex 16 Fr  (Active)   Number of days: 0       Anesthesia Type:   General w/ Epidural    Operative Indications:  Closed right hip fracture ([address] ) [S72 001A]  Status post right hip replacement [Z96 641]  Displaced periprosthetic fracture right hip    Operative Findings:  Loose femoral stem and displaced femoral fracture right hip    Complications:   None    Procedure and Technique:  Revision right total hip arthroplasty and open reduction internal fixation periprosthetic fracture status post right total hip arthroplasty  This patient had a hip replacement just over 18 days ago  Postoperatively she was discharged to a rehab center  Apparently she signed herself out of the rehab center [facility]  She subsequently had a fall at home and suffered a fracture of her of right proximal femur  There was an attempt to treat this conservatively but that failed when the fracture propagated  Patient was brought the operating room today for revision hip replacement along with an open reduction internal fixation of the periprosthetic fracture    Patient was administered general anesthetic with trach innovation and placed in the lateral position right side up left side down prepped and draped in usual sterile fashion for an operation on the right hip  A posterior lateral approach was used  Sharp dissection was carried out down through subcutaneous tissue to the fascia  The fascia was incised the length of the wound  The vastus lateralis was split and dissection was carried out down to the bone  The fracture was visualized  Of the hip was dislocated and the head and hip stem were removed from the femur  The femoral shaft was reduced using a combination of guide pins and 2 point reduction forceps  A 12 hole proximal femoral plate was applied to the femur  Provisional fixation was used to hold the plate in place  The canal was hand reamed to 15 mm  Trial femoral components from the [employer] Revision stem set were inserted  The size 13 seemed appropriate  Next cables were applied proximally around the fracture site within the eyelet through the plate  The cables were tightened to 50 mp  Three cables were used  Two bicortical screws were inserted in the 2 distal holes  X-ray was taken and revealed that the fractures reduced in good position  A proximal locking screw was inserted to capture the greater trochanter  The 13 trial was inspected and it was found to have some toggle  The canal was broached to size 16 and this was quite stable  The size 16 collared core I HA coated stem was inserted  Plus five neck length with a ceramic head was applied  The head was 32 mm in diameter  Of this was reduced and found to be quite stable in a position of sleep there was no tendency toward anterior dislocation  And the hip was stable in flex to 90° and internally rotated to 45°  The hip was thoroughly irrigated with sterile saline solution  A receptor was applied to the wound  Another L sterile saline solution was passed through the wound    The fascia was closed using 0 Vicryl in a figure-of-eight fashion subcutaneous tissue was closed using 2 0 Vicryl inverted fashion skin was closed using staples sterile dressing was applied a in abduction pillow was applied the patient tolerated the procedure well left the operating good condition     I was present for the entire procedure    Patient Disposition:  PACU     SIGNATURE: Hemant Martin MD  DATE: January 2, 2020  TIME: 6:29 PM

## 2020-01-02 NOTE — PROGRESS NOTES
Progress Note - Kinza Epstein 1960, 61 y o  female MRN: [id number]  Unit/Bed#: CW2 214-02 Encounter: [id number]  Primary Care Provider: Bianca Martínez MD   Date and time admitted to hospital: 12/27/2019 10:59 AM    * Closed right hip fracture [facility])  Assessment & Plan  · Planned for OR today per orthopedics  · CT femur R w/o contrast (12/31/19): Obliquely oriented almost nondisplaced proximal femoral shaft fracture around the site of the femoral stem without significant displacement of the hardware  Moderate amount of soft tissue calcification in the vicinity of the injury  Diminished density of the vastus intermedius muscle probably representing some muscular edema in the deep compartment  No dislocation at the hip  · Continue pain regimen with scheduled Tylenol, p r n  Oxycodone and IV dilaudid for breakthrough pain  · Continue Gabapentin 100 mg TID    Iron deficiency anemia secondary to inadequate dietary iron intake  Assessment & Plan  · Continue iron supplementation    ABIMAEL (generalized anxiety disorder)  Assessment & Plan  · Continue home medications:  · Lorazepam p r n, paroxetine daily, Geodon b i d, Seroquel at bedtime    Schizoaffective disorder, depressive type (HCC)  Assessment & Plan  · Continue Seroquel at bedtime, Geodon 80 mg BID and Ativan p r n  Acquired hypothyroidism  Assessment & Plan  · Continue daily levothyroxine    Localized osteoporosis with current pathological fracture with routine healing  Assessment & Plan  · Vitamin-D supplementation  · Outpatient endocrine follow-up    Chronic hyponatremia  Assessment & Plan  · Chronic and stable  · Psychiatric meds likely contribute to hyponatremia    VTE Pharmacologic Prophylaxis:   Pharmacologic: AC held today in anticipation for OR  Mechanical: Mechanical VTE prophylaxis in place  Patient Centered Rounds: I have performed bedside rounds with nursing staff today    Discussions with Specialists or Other Care Team Provider: None  Education and Discussions with Family / Patient: All patient questions answered to the best of my ability  Time Spent for Care: 20 minutes  More than 50% of total time spent on counseling and coordination of care as described above  Current Length of Stay: 4 day(s)  Current Patient Status: Inpatient   Certification Statement: The patient will continue to require additional inpatient hospital stay due to need for surgical intervention  Discharge Plan: Patient is not medically stable for discharge  Will require post-op PT evaluation for discharge recommendations  Code Status: Level 1 - Full Code    Subjective:   Patient currently sitting in bedside chair complaining of pain in the right hip despite taking oxycodone a few hours ago  Requesting additional pain medication  Objective:   Vitals:   Temp (24hrs), Av 5 °F (36 4 °C), Min:96 9 °F (36 1 °C), Max:98 1 °F (36 7 °C)    Temp:  [96 9 °F (36 1 °C)-98 1 °F (36 7 °C)] 98 1 °F (36 7 °C)  HR:  [80-97] 97  Resp:  [17] 17  BP: (103-152)/(61-97) 152/97  SpO2:  [96 %-100 %] 98 %  Body mass index is 27 81 kg/m²  Input and Output Summary (last 24 hours): Intake/Output Summary (Last 24 hours) at 2020 1043  Last data filed at 2020 0823  Gross per 24 hour   Intake 590 ml   Output 4200 ml   Net -3610 ml       Physical Exam:     Physical Exam   HENT:   Head: Normocephalic and atraumatic  Mouth/Throat: Oropharynx is clear and moist and mucous membranes are normal    Eyes: No scleral icterus  Cardiovascular: Normal rate and regular rhythm  No murmur heard  Pulmonary/Chest: Breath sounds normal  She has no wheezes  She has no rales  She exhibits no tenderness  Abdominal: Soft  Bowel sounds are normal  She exhibits no distension  There is no tenderness  Musculoskeletal: Normal range of motion  She exhibits no edema  Skin: Skin is warm and dry  No rash noted  Psychiatric: She has a normal mood and affect     Vitals reviewed  Additional Data:   Labs:  Results from last 7 days   Lab Units 01/02/20  0523   WBC Thousand/uL 3 38*   HEMOGLOBIN g/dL 11 6   HEMATOCRIT % 36 3   PLATELETS Thousands/uL 367   NEUTROS PCT % 61   LYMPHS PCT % 26   MONOS PCT % 10   EOS PCT % 2     Results from last 7 days   Lab Units 01/02/20  0523   POTASSIUM mmol/L 4 2   CHLORIDE mmol/L 96*   CO2 mmol/L 34*   BUN mg/dL 9   CREATININE mg/dL 0 60   CALCIUM mg/dL 9 9     Results from last 7 days   Lab Units 01/02/20  0523   INR  1 01       * I Have Reviewed All Lab Data Listed Above  * Additional Pertinent Lab Tests Reviewed: All Labs Within Last 24 Hours Reviewed    Imaging:    Imaging Reports Reviewed Today Include: None new    Cultures:   Blood Culture:   Lab Results   Component Value Date    BLOODCX No Growth After 5 Days   08/13/2018    BLOODCX Staphylococcus coagulase negative (A) 08/13/2018     Urine Culture:   Lab Results   Component Value Date    URINECX 70,000-79,000 cfu/ml Mixed Contaminants X5 09/24/2016     Sputum Culture: No components found for: SPUTUMCX  Wound Culture: No results found for: WOUNDCULT    Last 24 Hours Medication List:     Current Facility-Administered Medications:  acetaminophen 975 mg Oral Q8H [facility] Omar Flores DO    aluminum-magnesium hydroxide-simethicone 30 mL Oral Q4H PRN ABENA Wilson    ascorbic acid 500 mg Oral BID Frandy Flores,     calcium carbonate 1 tablet Oral BID With Meals Hinds CliffDO    calcium carbonate 500 mg Oral Daily PRN ABENA Wilson    cholecalciferol 2,000 Units Oral Daily Frandy Flores DO    docusate sodium 100 mg Oral BID Hinds DO Cliff    [START ON 1/3/2020] enoxaparin 40 mg Subcutaneous Q24H [facility] Barbara Lord PA-C    ferrous sulfate 325 mg Oral BID With Meals Hinds DO Cliff    folic acid 1 mg Oral Daily Frandygeraldine Flores, DO    gabapentin 100 mg Oral TID Hinds DO Cliff    HYDROmorphone 0 2 mg Intravenous Q4H PRN Bishop Siemens, PA-C lactated ringers 75 mL/hr Intravenous Continuous Lindsay Linton MD Last Rate: 75 mL/hr (01/02/20 0000)   levothyroxine 25 mcg Oral Early Morning Demetris Harvey,     lidocaine 2 patch Topical Daily Maximilian Mosley MD    LORazepam 2 mg Oral Q8H PRN Demetris Harvey, DO    methocarbamol 750 mg Oral Q6H [address] Demetris Harvey, DO    multivitamin-minerals 1 tablet Oral Daily [facility], [facility]    oxyCODONE 5 mg Oral Q4H PRN ABENA Martinez    oxyCODONE 10 mg Oral Q4H PRN Demetris Harvey, DO    pantoprazole 40 mg Oral BID AC [facility], DO    PARoxetine 60 mg Oral QAM [facility], DO    QUEtiapine 400 mg Oral HS [facility], DO    ziprasidone 80 mg Oral BID Demetris Harvey,          Today, Patient Was Seen By: Kendall Bauer PA-C    ** Please Note: Dragon 360 Dictation voice to text software may have been used in the creation of this document   **

## 2020-01-02 NOTE — ANESTHESIA PREPROCEDURE EVALUATION
Review of Systems/Medical History  Patient summary reviewed  Chart reviewed  No history of anesthetic complications     Cardiovascular  Hyperlipidemia, Hypertension controlled,    Pulmonary  Negative pulmonary ROS        GI/Hepatic    GERD ,        Negative  ROS        Endo/Other  History of thyroid disease ,   Comment: Serotonin syndrome       GYN  Negative gynecology ROS          Hematology  Anemia ,     Musculoskeletal  Rheumatoid arthritis ,   Arthritis     Neurology  Seizures (Reports history of one seziure episode after taking a particular medication- unable to state which drug) ,     Psychology   Psychiatric history (Schizoaffective disorder), Anxiety (takes ativan), Depression ,              Physical Exam    Airway    Mallampati score: III  TM Distance: >3 FB  Neck ROM: full     Dental   No notable dental hx     Cardiovascular  Cardiovascular exam normal    Pulmonary  Pulmonary exam normal     Other Findings        Anesthesia Plan  ASA Score- 3     Anesthesia Type- general with ASA Monitors  Additional Monitors:   Airway Plan: ETT  Comment: GA with ETT, IV, antiemetics, T/C blood         I saw and evaluated the patient  If seen with CRNA, we have discussed the anesthetic plan and I am in agreement that the plan is appropriate for the patient        Plan Factors-    Induction- intravenous  Postoperative Plan-   Planned trial extubation    Informed Consent- Anesthetic plan and risks discussed with patient  I personally reviewed this patient with the CRNA  Discussed and agreed on the Anesthesia Plan with the CRNA  Jumana Fortune

## 2020-01-02 NOTE — PROGRESS NOTES
Progress Note - Orthopedics   Gilford Pimple 61 y o  female MRN: [id number]  Unit/Bed#: CW2 214-02      Subjective:    61 y  o female with R hip PP femur fracture, no acute events overnight, comfortable this morning     Labs:  0   Lab Value Date/Time    HCT 36 3 01/02/2020 0523    HCT 36 9 01/01/2020 0542    HCT 34 4 (L) 12/27/2019 2103    HGB 11 6 01/02/2020 0523    HGB 11 8 01/01/2020 0542    HGB 11 4 (L) 12/27/2019 2103    INR 1 01 01/02/2020 0523    WBC 3 38 (L) 01/02/2020 0523    WBC 3 18 (L) 01/01/2020 0542    WBC 5 22 12/27/2019 2103    CRP 4 8 (H) 12/04/2019 1004       Meds:    Current Facility-Administered Medications:     acetaminophen (TYLENOL) tablet 975 mg, 975 mg, Oral, Q8H [address], Judene Spire Veres, DO, 975 mg at 01/02/20 0450    aluminum-magnesium hydroxide-simethicone (MYLANTA) 200-200-20 mg/5 mL oral suspension 30 mL, 30 mL, Oral, Q4H PRN, AlABENA Arreola    ascorbic acid (VITAMIN C) tablet 500 mg, 500 mg, Oral, BID, Judene Spire Veres, DO, 500 mg at 01/01/20 1728    calcium carbonate (OYSTER SHELL,OSCAL) 500 mg tablet 1 tablet, 1 tablet, Oral, BID With Meals, Ke Quinteror, DO, 1 tablet at 01/02/20 0454    calcium carbonate (TUMS) chewable tablet 500 mg, 500 mg, Oral, Daily PRN, AlABENA Arreola, 500 mg at 12/29/19 1321    cholecalciferol (VITAMIN D3) tablet 2,000 Units, 2,000 Units, Oral, Daily, Ke Magyar, DO, 2,000 Units at 01/01/20 0856    docusate sodium (COLACE) capsule 100 mg, 100 mg, Oral, BID, Judene Spire Veres, DO, 100 mg at 01/01/20 1727    [START ON 1/3/2020] enoxaparin (LOVENOX) subcutaneous injection 40 mg, 40 mg, Subcutaneous, Q24H YUMIKO, Migue Ramos PA-C    ferrous sulfate tablet 325 mg, 325 mg, Oral, BID With Meals, Ke Winters DO, 325 mg at 72/60/09 1777    folic acid (FOLVITE) tablet 1 mg, 1 mg, Oral, Daily, Carlos Flores DO, 1 mg at 01/01/20 0857    gabapentin (NEURONTIN) capsule 100 mg, 100 mg, Oral, TID, Ted Flores DO, 100 mg at 01/01/20 2117    HYDROmorphone (DILAUDID) injection 0 2 mg, 0 2 mg, Intravenous, Q4H PRN, Fidelia Khan PA-C    lactated ringers infusion, 75 mL/hr, Intravenous, Continuous, Shahab Correa MD, Last Rate: 75 mL/hr at 01/02/20 0000, 75 mL/hr at 01/02/20 0000    levothyroxine tablet 25 mcg, 25 mcg, Oral, Early Morning, Ryan Cornejoron Veres, DO, 25 mcg at 01/02/20 0452    lidocaine (LIDODERM) 5 % patch 2 patch, 2 patch, Topical, Daily, Tatiana Yip MD, 2 patch at 01/01/20 3171    LORazepam (ATIVAN) tablet 2 mg, 2 mg, Oral, Q8H PRN, Vanessa Dick, DO, 2 mg at 01/02/20 0550    methocarbamol (ROBAXIN) tablet 750 mg, 750 mg, Oral, Q6H [address], Ryan Cornejoron Veres, DO, 750 mg at 01/02/20 0452    multivitamin-minerals (CENTRUM) tablet 1 tablet, 1 tablet, Oral, Daily, Vanessa Edfortino, DO, 1 tablet at 01/01/20 0857    oxyCODONE (ROXICODONE) IR tablet 5 mg, 5 mg, Oral, Q4H PRN, ABENA Meier, 5 mg at 01/01/20 1726    oxyCODONE (ROXICODONE) oral solution 10 mg, 10 mg, Oral, Q4H PRN, Ryan Ramos Veres, DO, 10 mg at 01/01/20 0600    pantoprazole (PROTONIX) EC tablet 40 mg, 40 mg, Oral, BID AC, Omar MERRITT Veres, DO, 40 mg at 01/02/20 0453    PARoxetine (PAXIL) tablet 60 mg, 60 mg, Oral, QAM, Ryan Ramos Veres, DO, 60 mg at 01/01/20 0857    QUEtiapine (SEROquel) tablet 400 mg, 400 mg, Oral, HS, Ryan Cornejoron Veres, DO, 400 mg at 01/01/20 2117    ziprasidone (GEODON) capsule 80 mg, 80 mg, Oral, BID, Ryan Ramos Veres, DO, 80 mg at 01/01/20 1842    Blood Culture:   Lab Results   Component Value Date    BLOODCX No Growth After 5 Days  08/13/2018    BLOODCX Staphylococcus coagulase negative (A) 08/13/2018       Wound Culture:   No results found for: WOUNDCULT    Ins and Outs:  I/O last 24 hours:   In: 1300 [P O :1300]  Out: 6050 [Urine:6050]          Physical:  Vitals:    01/01/20 2258   BP: 105/66   Pulse: 80   Resp:    Temp: 98 1 °F (36 7 °C)   SpO2: 96%     Musculoskeletal: right Lower Extremity  · Skin with well healed incision   · TTP over proximal thigh   · Motor and sensory innervation grossly intact throughout the right lower extremity  · Brisk capillary refill to the foot and toes     Assessment:    61 y  o female with R hip periprosthetic fx       Plan:  · NWB RLE  · To OR today for revision vs ORIF of the right hip PPfx  · Hold anticoagulation today in AM in prep for OR  · NPO today  · Consented  · Clearance/preop risk per Isis Witt MD

## 2020-01-02 NOTE — PHYSICAL THERAPY NOTE
Physical Therapy Cancellation Note  Attempted to see patient this AM; Per Ortho, OR today for revision vs ORIF for the right hip PPfx  PT to continue to follow and perform re-evaluation post-op        Dionne Rosales PT, DPT

## 2020-01-02 NOTE — SOCIAL WORK
Pt to OR today  Await return call from [facility] that same Letter of Determination from last admit can be used for SNF

## 2020-01-02 NOTE — ASSESSMENT & PLAN NOTE
· Planned for OR today per orthopedics  · CT femur R w/o contrast (12/31/19): Obliquely oriented almost nondisplaced proximal femoral shaft fracture around the site of the femoral stem without significant displacement of the hardware  Moderate amount of soft tissue calcification in the vicinity of the injury  Diminished density of the vastus intermedius muscle probably representing some muscular edema in the deep compartment  No dislocation at the hip  · Continue pain regimen with scheduled Tylenol, p r n   Oxycodone and IV dilaudid for breakthrough pain  · Continue Gabapentin 100 mg TID 8

## 2020-01-03 ENCOUNTER — APPOINTMENT (INPATIENT)
Dept: NON INVASIVE DIAGNOSTICS | Facility: HOSPITAL | Age: 60
DRG: 466 | End: 2020-01-03
Payer: COMMERCIAL

## 2020-01-03 ENCOUNTER — APPOINTMENT (INPATIENT)
Dept: NEUROLOGY | Facility: CLINIC | Age: 60
DRG: 466 | End: 2020-01-03
Payer: COMMERCIAL

## 2020-01-03 ENCOUNTER — APPOINTMENT (INPATIENT)
Dept: RADIOLOGY | Facility: HOSPITAL | Age: 60
DRG: 466 | End: 2020-01-03
Payer: COMMERCIAL

## 2020-01-03 PROBLEM — E87.20 LACTIC ACIDOSIS: Status: ACTIVE | Noted: 2020-01-03

## 2020-01-03 PROBLEM — E87.2 LACTIC ACIDOSIS: Status: ACTIVE | Noted: 2020-01-03

## 2020-01-03 PROBLEM — G21.0 NMS (NEUROLEPTIC MALIGNANT SYNDROME): Status: ACTIVE | Noted: 2020-01-03

## 2020-01-03 LAB
ALBUMIN SERPL BCP-MCNC: 2.8 G/DL (ref 3.5–5)
ALP SERPL-CCNC: 146 U/L (ref 46–116)
ALT SERPL W P-5'-P-CCNC: 57 U/L (ref 12–78)
ANION GAP SERPL CALCULATED.3IONS-SCNC: 10 MMOL/L (ref 4–13)
AST SERPL W P-5'-P-CCNC: 218 U/L (ref 5–45)
ATRIAL RATE: 132 BPM
ATRIAL RATE: 138 BPM
BILIRUB DIRECT SERPL-MCNC: 0.21 MG/DL (ref 0–0.2)
BILIRUB SERPL-MCNC: 0.72 MG/DL (ref 0.2–1)
BUN SERPL-MCNC: 9 MG/DL (ref 5–25)
CALCIUM SERPL-MCNC: 9.5 MG/DL (ref 8.3–10.1)
CHLORIDE SERPL-SCNC: 108 MMOL/L (ref 100–108)
CK MB SERPL-MCNC: 14.4 NG/ML (ref 0–5)
CK MB SERPL-MCNC: 35.3 NG/ML (ref 0–5)
CK MB SERPL-MCNC: 53.2 NG/ML (ref 0–5)
CK MB SERPL-MCNC: 61.8 NG/ML (ref 0–5)
CK MB SERPL-MCNC: <1 % (ref 0–2.5)
CK SERPL-CCNC: 1656 U/L (ref 26–192)
CK SERPL-CCNC: 4185 U/L (ref 26–192)
CK SERPL-CCNC: 9389 U/L (ref 26–192)
CK SERPL-CCNC: ABNORMAL U/L (ref 26–192)
CO2 SERPL-SCNC: 22 MMOL/L (ref 21–32)
CREAT SERPL-MCNC: 0.94 MG/DL (ref 0.6–1.3)
ERYTHROCYTE [DISTWIDTH] IN BLOOD BY AUTOMATED COUNT: 17.4 % (ref 11.6–15.1)
GFR SERPL CREATININE-BSD FRML MDRD: 67 ML/MIN/1.73SQ M
GLUCOSE SERPL-MCNC: 102 MG/DL (ref 65–140)
GLUCOSE SERPL-MCNC: 104 MG/DL (ref 65–140)
GLUCOSE SERPL-MCNC: 144 MG/DL (ref 65–140)
HCT VFR BLD AUTO: 27.9 % (ref 34.8–46.1)
HGB BLD-MCNC: 9.3 G/DL (ref 11.5–15.4)
LACTATE SERPL-SCNC: 1.8 MMOL/L (ref 0.5–2)
LACTATE SERPL-SCNC: 3.4 MMOL/L (ref 0.5–2)
LACTATE SERPL-SCNC: 3.7 MMOL/L (ref 0.5–2)
MCH RBC QN AUTO: 28.4 PG (ref 26.8–34.3)
MCHC RBC AUTO-ENTMCNC: 33.3 G/DL (ref 31.4–37.4)
MCV RBC AUTO: 85 FL (ref 82–98)
P AXIS: 67 DEGREES
P AXIS: 73 DEGREES
PLATELET # BLD AUTO: 356 THOUSANDS/UL (ref 149–390)
PMV BLD AUTO: 8.1 FL (ref 8.9–12.7)
POTASSIUM SERPL-SCNC: 4.8 MMOL/L (ref 3.5–5.3)
PR INTERVAL: 117 MS
PR INTERVAL: 120 MS
PROCALCITONIN SERPL-MCNC: <0.05 NG/ML
PROT SERPL-MCNC: 5.5 G/DL (ref 6.4–8.2)
QRS AXIS: 42 DEGREES
QRS AXIS: 51 DEGREES
QRSD INTERVAL: 54 MS
QRSD INTERVAL: 71 MS
QT INTERVAL: 300 MS
QT INTERVAL: 304 MS
QTC INTERVAL: 451 MS
QTC INTERVAL: 454 MS
RBC # BLD AUTO: 3.28 MILLION/UL (ref 3.81–5.12)
SODIUM SERPL-SCNC: 140 MMOL/L (ref 136–145)
T WAVE AXIS: 67 DEGREES
T WAVE AXIS: 89 DEGREES
T4 FREE SERPL-MCNC: 0.88 NG/DL (ref 0.76–1.46)
TROPONIN I SERPL-MCNC: <0.02 NG/ML
TSH SERPL DL<=0.05 MIU/L-ACNC: 0.35 UIU/ML (ref 0.36–3.74)
VENTRICULAR RATE: 132 BPM
VENTRICULAR RATE: 138 BPM
WBC # BLD AUTO: 10.47 THOUSAND/UL (ref 4.31–10.16)

## 2020-01-03 PROCEDURE — 93010 ELECTROCARDIOGRAM REPORT: CPT | Performed by: INTERNAL MEDICINE

## 2020-01-03 PROCEDURE — 82553 CREATINE MB FRACTION: CPT | Performed by: PHYSICIAN ASSISTANT

## 2020-01-03 PROCEDURE — 87040 BLOOD CULTURE FOR BACTERIA: CPT | Performed by: PHYSICIAN ASSISTANT

## 2020-01-03 PROCEDURE — 83605 ASSAY OF LACTIC ACID: CPT | Performed by: INTERNAL MEDICINE

## 2020-01-03 PROCEDURE — 80048 BASIC METABOLIC PNL TOTAL CA: CPT | Performed by: PHYSICIAN ASSISTANT

## 2020-01-03 PROCEDURE — 99233 SBSQ HOSP IP/OBS HIGH 50: CPT | Performed by: PSYCHIATRY & NEUROLOGY

## 2020-01-03 PROCEDURE — 83605 ASSAY OF LACTIC ACID: CPT | Performed by: PHYSICIAN ASSISTANT

## 2020-01-03 PROCEDURE — 70450 CT HEAD/BRAIN W/O DYE: CPT

## 2020-01-03 PROCEDURE — 82948 REAGENT STRIP/BLOOD GLUCOSE: CPT

## 2020-01-03 PROCEDURE — 93306 TTE W/DOPPLER COMPLETE: CPT | Performed by: INTERNAL MEDICINE

## 2020-01-03 PROCEDURE — 85027 COMPLETE CBC AUTOMATED: CPT | Performed by: PHYSICIAN ASSISTANT

## 2020-01-03 PROCEDURE — NC001 PR NO CHARGE: Performed by: INTERNAL MEDICINE

## 2020-01-03 PROCEDURE — 93306 TTE W/DOPPLER COMPLETE: CPT

## 2020-01-03 PROCEDURE — 84439 ASSAY OF FREE THYROXINE: CPT | Performed by: STUDENT IN AN ORGANIZED HEALTH CARE EDUCATION/TRAINING PROGRAM

## 2020-01-03 PROCEDURE — 95816 EEG AWAKE AND DROWSY: CPT | Performed by: PSYCHIATRY & NEUROLOGY

## 2020-01-03 PROCEDURE — 82550 ASSAY OF CK (CPK): CPT | Performed by: PHYSICIAN ASSISTANT

## 2020-01-03 PROCEDURE — 99449 NTRPROF PH1/NTRNET/EHR 31/>: CPT | Performed by: EMERGENCY MEDICINE

## 2020-01-03 PROCEDURE — NS001 PR NO SIGNATURE OR ATTESTATION: Performed by: ORTHOPAEDIC SURGERY

## 2020-01-03 PROCEDURE — 99233 SBSQ HOSP IP/OBS HIGH 50: CPT | Performed by: INTERNAL MEDICINE

## 2020-01-03 PROCEDURE — 84145 PROCALCITONIN (PCT): CPT | Performed by: PHYSICIAN ASSISTANT

## 2020-01-03 PROCEDURE — 84484 ASSAY OF TROPONIN QUANT: CPT | Performed by: PHYSICIAN ASSISTANT

## 2020-01-03 PROCEDURE — 84443 ASSAY THYROID STIM HORMONE: CPT | Performed by: STUDENT IN AN ORGANIZED HEALTH CARE EDUCATION/TRAINING PROGRAM

## 2020-01-03 PROCEDURE — 93005 ELECTROCARDIOGRAM TRACING: CPT

## 2020-01-03 PROCEDURE — 80076 HEPATIC FUNCTION PANEL: CPT | Performed by: PHYSICIAN ASSISTANT

## 2020-01-03 PROCEDURE — 95816 EEG AWAKE AND DROWSY: CPT

## 2020-01-03 RX ORDER — ACETAMINOPHEN 650 MG/1
650 SUPPOSITORY RECTAL EVERY 4 HOURS PRN
Status: DISCONTINUED | OUTPATIENT
Start: 2020-01-03 | End: 2020-01-03

## 2020-01-03 RX ORDER — METOPROLOL TARTRATE 5 MG/5ML
5 INJECTION INTRAVENOUS ONCE
Status: COMPLETED | OUTPATIENT
Start: 2020-01-03 | End: 2020-01-03

## 2020-01-03 RX ORDER — DIAZEPAM 5 MG/ML
10 INJECTION, SOLUTION INTRAMUSCULAR; INTRAVENOUS ONCE
Status: COMPLETED | OUTPATIENT
Start: 2020-01-03 | End: 2020-01-03

## 2020-01-03 RX ORDER — LORAZEPAM 2 MG/ML
2 INJECTION INTRAMUSCULAR ONCE
Status: COMPLETED | OUTPATIENT
Start: 2020-01-03 | End: 2020-01-03

## 2020-01-03 RX ORDER — BROMOCRIPTINE MESYLATE 2.5 MG/1
2.5 TABLET ORAL EVERY 12 HOURS
Status: DISCONTINUED | OUTPATIENT
Start: 2020-01-03 | End: 2020-01-04

## 2020-01-03 RX ORDER — HYDROMORPHONE HCL/PF 1 MG/ML
0.5 SYRINGE (ML) INJECTION ONCE
Status: COMPLETED | OUTPATIENT
Start: 2020-01-03 | End: 2020-01-03

## 2020-01-03 RX ORDER — SODIUM CHLORIDE, SODIUM GLUCONATE, SODIUM ACETATE, POTASSIUM CHLORIDE, MAGNESIUM CHLORIDE, SODIUM PHOSPHATE, DIBASIC, AND POTASSIUM PHOSPHATE .53; .5; .37; .037; .03; .012; .00082 G/100ML; G/100ML; G/100ML; G/100ML; G/100ML; G/100ML; G/100ML
1000 INJECTION, SOLUTION INTRAVENOUS ONCE
Status: COMPLETED | OUTPATIENT
Start: 2020-01-03 | End: 2020-01-03

## 2020-01-03 RX ORDER — DIAZEPAM 5 MG/ML
INJECTION, SOLUTION INTRAMUSCULAR; INTRAVENOUS
Status: COMPLETED
Start: 2020-01-03 | End: 2020-01-03

## 2020-01-03 RX ORDER — ADENOSINE 3 MG/ML
6 INJECTION INTRAVENOUS ONCE
Status: DISCONTINUED | OUTPATIENT
Start: 2020-01-03 | End: 2020-01-03

## 2020-01-03 RX ORDER — METOPROLOL TARTRATE 5 MG/5ML
5 INJECTION INTRAVENOUS ONCE
Status: DISCONTINUED | OUTPATIENT
Start: 2020-01-03 | End: 2020-01-03

## 2020-01-03 RX ORDER — VANCOMYCIN HYDROCHLORIDE 1 G/200ML
15 INJECTION, SOLUTION INTRAVENOUS EVERY 12 HOURS
Status: DISCONTINUED | OUTPATIENT
Start: 2020-01-03 | End: 2020-01-03

## 2020-01-03 RX ORDER — DANTROLENE 20 MG/1
100 INJECTION, POWDER, FOR SOLUTION INTRAVENOUS ONCE
Status: COMPLETED | OUTPATIENT
Start: 2020-01-03 | End: 2020-01-03

## 2020-01-03 RX ORDER — LORAZEPAM 2 MG/ML
INJECTION INTRAMUSCULAR
Status: COMPLETED
Start: 2020-01-03 | End: 2020-01-03

## 2020-01-03 RX ADMIN — PANTOPRAZOLE SODIUM 40 MG: 20 TABLET, DELAYED RELEASE ORAL at 17:24

## 2020-01-03 RX ADMIN — METHOCARBAMOL 750 MG: 750 TABLET, FILM COATED ORAL at 11:55

## 2020-01-03 RX ADMIN — DIAZEPAM 10 MG: 5 INJECTION, SOLUTION INTRAMUSCULAR; INTRAVENOUS at 04:59

## 2020-01-03 RX ADMIN — VANCOMYCIN HYDROCHLORIDE 1000 MG: 1 INJECTION, SOLUTION INTRAVENOUS at 04:06

## 2020-01-03 RX ADMIN — GABAPENTIN 100 MG: 100 CAPSULE ORAL at 17:23

## 2020-01-03 RX ADMIN — HYDROMORPHONE HYDROCHLORIDE 0.5 MG: 1 INJECTION, SOLUTION INTRAMUSCULAR; INTRAVENOUS; SUBCUTANEOUS at 04:30

## 2020-01-03 RX ADMIN — DIAZEPAM 10 MG: 10 INJECTION, SOLUTION INTRAMUSCULAR; INTRAVENOUS at 06:14

## 2020-01-03 RX ADMIN — CEFEPIME HYDROCHLORIDE 2000 MG: 2 INJECTION, POWDER, FOR SOLUTION INTRAVENOUS at 04:25

## 2020-01-03 RX ADMIN — ACETAMINOPHEN 650 MG: 650 SUPPOSITORY RECTAL at 04:51

## 2020-01-03 RX ADMIN — METHOCARBAMOL 750 MG: 750 TABLET, FILM COATED ORAL at 01:08

## 2020-01-03 RX ADMIN — ENOXAPARIN SODIUM 40 MG: 40 INJECTION SUBCUTANEOUS at 08:14

## 2020-01-03 RX ADMIN — SODIUM CHLORIDE, SODIUM LACTATE, POTASSIUM CHLORIDE, AND CALCIUM CHLORIDE 1000 ML: .6; .31; .03; .02 INJECTION, SOLUTION INTRAVENOUS at 04:30

## 2020-01-03 RX ADMIN — GABAPENTIN 100 MG: 100 CAPSULE ORAL at 20:15

## 2020-01-03 RX ADMIN — ACETAMINOPHEN 975 MG: 325 TABLET ORAL at 13:43

## 2020-01-03 RX ADMIN — OXYCODONE HYDROCHLORIDE 5 MG: 5 TABLET ORAL at 03:31

## 2020-01-03 RX ADMIN — LORAZEPAM 2 MG: 1 TABLET ORAL at 23:08

## 2020-01-03 RX ADMIN — LORAZEPAM 2 MG: 2 INJECTION INTRAMUSCULAR at 04:50

## 2020-01-03 RX ADMIN — SODIUM CHLORIDE, SODIUM GLUCONATE, SODIUM ACETATE, POTASSIUM CHLORIDE, MAGNESIUM CHLORIDE, SODIUM PHOSPHATE, DIBASIC, AND POTASSIUM PHOSPHATE 1000 ML: .53; .5; .37; .037; .03; .012; .00082 INJECTION, SOLUTION INTRAVENOUS at 17:25

## 2020-01-03 RX ADMIN — METHOCARBAMOL 750 MG: 750 TABLET, FILM COATED ORAL at 17:23

## 2020-01-03 RX ADMIN — CEFAZOLIN SODIUM 1000 MG: 1 SOLUTION INTRAVENOUS at 01:09

## 2020-01-03 RX ADMIN — DIAZEPAM 10 MG: 10 INJECTION, SOLUTION INTRAMUSCULAR; INTRAVENOUS at 06:40

## 2020-01-03 RX ADMIN — DIAZEPAM 10 MG: 10 INJECTION, SOLUTION INTRAMUSCULAR; INTRAVENOUS at 04:59

## 2020-01-03 RX ADMIN — SODIUM CHLORIDE, SODIUM LACTATE, POTASSIUM CHLORIDE, AND CALCIUM CHLORIDE 250 ML/HR: .6; .31; .03; .02 INJECTION, SOLUTION INTRAVENOUS at 19:49

## 2020-01-03 RX ADMIN — SODIUM CHLORIDE, SODIUM LACTATE, POTASSIUM CHLORIDE, AND CALCIUM CHLORIDE 75 ML/HR: .6; .31; .03; .02 INJECTION, SOLUTION INTRAVENOUS at 00:45

## 2020-01-03 RX ADMIN — LIDOCAINE 2 PATCH: 50 PATCH CUTANEOUS at 08:13

## 2020-01-03 RX ADMIN — OXYCODONE HYDROCHLORIDE 10 MG: 5 SOLUTION ORAL at 20:15

## 2020-01-03 RX ADMIN — BROMOCRIPTINE MESYLATE 2.5 MG: 2.5 TABLET ORAL at 11:55

## 2020-01-03 RX ADMIN — BROMOCRIPTINE MESYLATE 2.5 MG: 2.5 TABLET ORAL at 23:08

## 2020-01-03 RX ADMIN — METHOCARBAMOL 750 MG: 750 TABLET, FILM COATED ORAL at 23:08

## 2020-01-03 RX ADMIN — LORAZEPAM 2 MG: 2 INJECTION INTRAMUSCULAR; INTRAVENOUS at 04:50

## 2020-01-03 RX ADMIN — ACETAMINOPHEN 975 MG: 325 TABLET ORAL at 21:36

## 2020-01-03 RX ADMIN — SODIUM CHLORIDE, SODIUM LACTATE, POTASSIUM CHLORIDE, AND CALCIUM CHLORIDE 150 ML/HR: .6; .31; .03; .02 INJECTION, SOLUTION INTRAVENOUS at 15:21

## 2020-01-03 RX ADMIN — DANTROLENE SODIUM 100 MG: 20 INJECTION INTRAVENOUS at 06:41

## 2020-01-03 RX ADMIN — SODIUM CHLORIDE, SODIUM LACTATE, POTASSIUM CHLORIDE, AND CALCIUM CHLORIDE 250 ML/HR: .6; .31; .03; .02 INJECTION, SOLUTION INTRAVENOUS at 23:13

## 2020-01-03 RX ADMIN — QUETIAPINE FUMARATE 400 MG: 100 TABLET ORAL at 03:37

## 2020-01-03 RX ADMIN — DIAZEPAM 10 MG: 5 INJECTION, SOLUTION INTRAMUSCULAR; INTRAVENOUS at 06:40

## 2020-01-03 RX ADMIN — LORAZEPAM 2 MG: 1 TABLET ORAL at 03:38

## 2020-01-03 RX ADMIN — CALCIUM 1 TABLET: 500 TABLET ORAL at 17:25

## 2020-01-03 RX ADMIN — METOPROLOL TARTRATE 5 MG: 5 INJECTION, SOLUTION INTRAVENOUS at 04:28

## 2020-01-03 NOTE — ASSESSMENT & PLAN NOTE
Initially concern for infection   Given IVF bolus and started on cefepime and vancomycin by primary team  Cultures pending  Trend lactic until <2 0

## 2020-01-03 NOTE — PROGRESS NOTES
Pt post-op THR  Pt came to floor slightly diaphoretic,shaking, and frequently calling out for help 5mg oxy and 0 5mg Ativan did not seem to be helping over time  Pt progressively became more diaphoretic and agitated  HR increased into the 150s  No meds were in orders, SLIM called  SLIM at bedside to assess and 5mg lopressor given @0430  New IV site established (16G R hand)  Sepsis protocol started  Blood cultures, Lactic, CBC/BMP  Ancef @ 0100, [email or web address] (not completed), Cefepime @0430  Lactic=3 4  BP reaching 190 systolic  Pt's extremities became hard and rigid while SLIM and CC at bedside  LR Bolus @0430  Rectal temp= 103 9, Tylenol supp given @0451  4 5mg Ativan, 0 5mg Dilaudid, 10mg Valium, oxy, and Seroquel given while providers present  Pt transferred to MICU @0600

## 2020-01-03 NOTE — PROGRESS NOTES
Transfer Accept Progress Note -  Marta Brannon 1960, 61 y o  female MRN: [id number]  Unit/Bed#: [facility] 529-01 Encounter: [id number]    Primary Care Provider: Elenita Russo MD   Date and time admitted to hospital: 12/27/2019 10:59 AM    NMS (neuroleptic malignant syndrome)  Assessment & Plan  Patient started with worsening tachycardia hypertension tremors, delirium and rigidity  It was also discovered that she had rectal temp of 103 degrees F  Concern for NMS  She was given Ativan 2 mg with no significant change and Valium 10 mg with significant improvement in her tremors and rigidity  Patient does take Geodon, Paxil and Seroquel at home  She was given 1 L of lactated Ringer's  Lactic elevated and CK pending    Patient will be transferred to the medical ICU for close monitoring and potential need for dantrolene  * Closed right hip fracture (HCC)  Assessment & Plan  POD #1 s/p REVISION TOTAL HIP ARTHROPLASTY WITH REPAIR OF PARIPROSTHETIC FRACTURE - POSTERIOR APPROACH (Right)  Management per ortho    Lactic acidosis  Assessment & Plan  Initially concern for infection   Given IVF bolus and started on cefepime and vancomycin by primary team  Cultures pending  Trend lactic until <2 0    Tachycardia  Assessment & Plan  Improved after benzodiazepine administration  2D ECHO pending  Monitor on telemetry    SOB (shortness of breath)  Assessment & Plan  Maintain SpO2 >94% with NC  CXR without edema or infiltrate  ABIMAEL (generalized anxiety disorder)  Assessment & Plan  Takes Geodon, Paxil and Seroquel at home with PRN ativan      Schizoaffective disorder, depressive type ([facility] [address] )  Assessment & Plan  As above    Acquired hypothyroidism  Assessment & Plan  Synthroid    Localized osteoporosis with current pathological fracture with routine healing  Assessment & Plan  Ortho plan as above    Chronic hyponatremia  Assessment & Plan  Trend Na daily    ----------------------------------------------------------------------------------------  HPI/24hr events: Throughout the night, the patient's tachycardia continued with heart rates in the 120s  The patient appeared to be more delirious  The primary medical team had started a sepsis workup including IV fluids and antibiotic administration  Acutely around 4:30 a m , the patient became severely hypertensive and tachycardic with heart rates in the 170s and blood pressures with systolic in the 604Y  She was tremoring and rigid  Her rectal temp was 103 degrees F  There was concern for neuroleptic malignant syndrome and she was given 2mg of Ativan followed by 10mg of Valium with significant improvement and rigidity and tachycardia  Given these factors and concern for NMS, the patient was transferred to the medical ICU  Disposition: Continue Critical Care   Code Status: Level 1 - Full Code  ---------------------------------------------------------------------------------------  SUBJECTIVE  Review of systems cannot be obtained secondary to the patient's change in mental status  She is confused and calling out incoherent sounds  ---------------------------------------------------------------------------------------  OBJECTIVE    Physical Exam   Constitutional: She appears well-developed  She appears ill  She appears distressed  Cardiovascular: Regular rhythm, S1 normal and S2 normal  Tachycardia present  Exam reveals no gallop and no friction rub  No murmur heard  Pulmonary/Chest: Breath sounds normal  Tachypnea noted  She is in respiratory distress  She has no wheezes  She has no rhonchi  She has no rales  Abdominal: Soft  Normal appearance  Musculoskeletal:        Right hip: She exhibits decreased range of motion and tenderness  Clean and dry incision  Neurological: She is alert  Mild tremor  Rigid upper and lower extremities  Skin: Skin is warm  No rash noted  She is diaphoretic  Vitals   Vitals:    20 2230 20 2341 20 0352 20 0500   BP: (!) 134/40 136/66 131/85 131/60   BP Location:  Right arm Right arm Right arm   Pulse: (!) 128 (!) 126 (!) 143    Resp:     Temp: 97 9 °F (36 6 °C) 98 8 °F (37 1 °C) 98 9 °F (37 2 °C) (!) 103 9 °F (39 9 °C)   TempSrc:  Oral Oral Rectal   SpO2: 99% 99% 95%    Weight:       Height:         Temp (24hrs), Av °F (37 2 °C), Min:97 °F (36 1 °C), Max:103 9 °F (39 9 °C)  Current: Temperature: (!) 103 9 °F (39 9 °C)    SpO2: SpO2: 95 %, SpO2 Activity: SpO2 Activity: At Rest, SpO2 Device: O2 Device: None (Room air)    Height and Weights   Height: 5' 3" (160 cm)  IBW: 52 4 kg  Body mass index is 27 81 kg/m²  Weight (last 2 days)     None        Intake and Output  I/O        0701 -  0700  07 -  0700    P  O  820     I V  (mL/kg)  3300 (46 3)    Blood  700    IV Piggyback  750    Total Intake(mL/kg) 820 (11 5) 4750 (66 7)    Urine (mL/kg/hr) 4650 (2 7) 3335 (2)    Stool 0     Blood  900    Total Output 4650 4235    Net -3830 +515          Unmeasured Stool Occurrence 1 x         Nutrition       Diet Orders   (From admission, onward)             Start     Ordered    20 0520  Diet NPO  Diet effective midnight     Question Answer Comment   Diet Type NPO    RD to adjust diet per protocol?  No        20 0519              Laboratory and Diagnostics:  Results from last 7 days   Lab Units 20  0435 20  1920 20  1756 20  1700 20  1604 20  1553 20  0523 20  0542 19  0435 19  2103   WBC Thousand/uL 10 47* 11 99*  --   --   --   --  3 38* 3 18*  --  5 22   HEMOGLOBIN g/dL 9 3* 11 6  --   --   --   --  11 6 11 8  --  11 4*   I STAT HEMOGLOBIN g/dl  --   --  10 5* 10 9* 8 8* 5 8*  --   --   --   --    HEMATOCRIT % 27 9* 35 5  --   --   --   --  36 3 36 9  --  34 4*   HEMATOCRIT, ISTAT %  --   --  31* 32* 26* 17*  --   --   --   --    PLATELETS Thousands/uL 356 429*  --   --   --   --  367 362 419* 463*   NEUTROS PCT %  --   --   --   --   --   --  61 66  --  74   MONOS PCT %  --   --   --   --   --   --  10 11  --  12     Results from last 7 days   Lab Units 01/03/20  0454 01/02/20  2230 01/02/20  1756 01/02/20  1700 01/02/20  1604 01/02/20  1553 01/02/20  0523 01/01/20  0542 12/28/19  0435 12/27/19  2103   SODIUM mmol/L 140 136  --   --   --   --  134* 132* 132* 132*   POTASSIUM mmol/L 4 8 4 3  --   --   --   --  4 2 3 6 3 5 3 2*   CHLORIDE mmol/L 108 105  --   --   --   --  96* 94* 97* 94*   CO2 mmol/L 22 24  --   --   --   --  34* 34* 32 33*   CO2, I-STAT mmol/L  --   --  27 28 28 20*  --   --   --   --    ANION GAP mmol/L 10 7  --   --   --   --  4 4 3* 5   BUN mg/dL 9 8  --   --   --   --  9 8 4* 4*   CREATININE mg/dL 0 94 0 71  --   --   --   --  0 60 0 58* 0 47* 0 51*   CALCIUM mg/dL 9 5 8 8  --   --   --   --  9 9 9 7 9 5 9 1   GLUCOSE RANDOM mg/dL 144* 159*  --   --   --   --  84 77 93 107          Results from last 7 days   Lab Units 01/02/20  0523   INR  1 01   PTT seconds 34      Results from last 7 days   Lab Units 01/03/20  0328   TROPONIN I ng/mL <0 02     Results from last 7 days   Lab Units 01/03/20  0336   LACTIC ACID mmol/L 3 4*     ABG:  Results from last 7 days   Lab Units 01/02/20 2230   PH ART  7 523*   PCO2 ART mm Hg 28 8*   PO2 ART mm Hg 112 1   HCO3 ART mmol/L 23 1   BASE EXC ART mmol/L 0 9   ABG SOURCE  Radial, Left     VBG:  Results from last 7 days   Lab Units 01/02/20  2230   ABG SOURCE  Radial, Left     Results from last 7 days   Lab Units 01/03/20  0328   PROCALCITONIN ng/ml <0 05       Active Medications  Scheduled Meds:    Current Facility-Administered Medications:  acetaminophen 650 mg Rectal Q4H PRN Kelsey Climbambi, CORY    acetaminophen 975 mg Oral [facility] Shawne Begun, CORY    aluminum-magnesium hydroxide-simethicone 30 mL Oral Q4H PRN Avie Begun, CORY    ascorbic acid 500 mg Oral BID Shawne Begun, CORY    calcium carbonate 1 tablet Oral BID With Meals Corinda Relic, PA-C    calcium carbonate 500 mg Oral Daily PRN Corinda Relic, PA-C    cefepime 2,000 mg Intravenous Q12H Skylar Craze, DO Last Rate: 2,000 mg (01/03/20 0425)   cholecalciferol 2,000 Units Oral Daily Corinda Relic, PA-C    docusate sodium 100 mg Oral BID Corinda Relic, PA-C    enoxaparin 40 mg Subcutaneous Q24H [address] Corinda Relic, PA-C    ferrous sulfate 325 mg Oral BID With Meals Corinda Relic, PA-C    folic acid 1 mg Oral Daily Corinda Relic, PA-C    gabapentin 100 mg Oral TID Corinda Relic, PA-C    HYDROmorphone 0 2 mg Intravenous Q4H PRN Corinda Relic, PA-C    iron sucrose 300 mg Intravenous Daily Corinda Relic, PA-C    lactated ringers 1,000 mL Intravenous Once Nata Gilliland, PA-C Last Rate: 1,000 mL (01/03/20 0430)   lactated ringers 75 mL/hr Intravenous Continuous Corinda Relic, PA-C Last Rate: 75 mL/hr (01/02/20 0000)   levothyroxine 25 mcg Oral Early Morning Corinda Relic, PA-C    lidocaine 2 patch Topical Daily Corinda Relic, PA-C    LORazepam 2 mg Oral Q8H PRN Corinda Relic, PA-C    methocarbamol 750 mg Oral Q6H [address] Corinda Relic, PA-C    multivitamin-minerals 1 tablet Oral Daily Corinda Relic, PA-C    oxyCODONE 5 mg Oral Q4H PRN Corinda Relic, PA-C    oxyCODONE 10 mg Oral Q4H PRN Corinda Relic, PA-C    pantoprazole 40 mg Oral BID AC Corinda Relic, PA-C    PARoxetine 60 mg Oral QAM Corinda Relic, PA-C    QUEtiapine 400 mg Oral HS Corinda Relic, PA-C    vancomycin 15 mg/kg (Adjusted) Intravenous Q12H Skylar Craze, DO Last Rate: 1,000 mg (01/03/20 0406)   ziprasidone 80 mg Oral BID Corinda Relic, PA-C      Continuous Infusions:    lactated ringers 75 mL/hr Last Rate: 75 mL/hr (01/02/20 0000)     PRN Meds:     acetaminophen 650 mg Q4H PRN   aluminum-magnesium hydroxide-simethicone 30 mL Q4H PRN   calcium carbonate 500 mg Daily PRN   HYDROmorphone 0 2 mg Q4H PRN   LORazepam 2 mg Q8H PRN   oxyCODONE 5 mg Q4H PRN   oxyCODONE 10 mg Q4H PRN     ---------------------------------------------------------------------------------------  Counseling / Coordination of Care  Total Critical Care time spent 39 minutes excluding procedures, teaching and family updates  Alethia Shone, PA-C    Portions of the record may have been created with voice recognition software  Occasional wrong word or "sound a like" substitutions may have occurred due to the inherent limitations of voice recognition software    Read the chart carefully and recognize, using context, where substitutions have occurred

## 2020-01-03 NOTE — ASSESSMENT & PLAN NOTE
Subjective- no hypoxia or obvious increased WOB on exam  Maintain SpO2 >94% with NC  CXR without edema or infiltrate

## 2020-01-03 NOTE — PROGRESS NOTES
Daily Progress Note - Critical Care   Kinza Epstein 61 y o  female MRN: [id number]  Unit/Bed#: MICU 10 Encounter: [id number]        ----------------------------------------------------------------------------------------  HPI/24hr events: 61 y o  Female with PMGH schizoaffective disorder on Geodon, Paxil, Seroquel with recent R total hip arthroplasty performed on 12/11/19 with discharge to inpatient rehab on 12/20/19 to inpatient rehab however she left AMA and had a mechanical fall at home and was found to have a new periprosthetic right femur fracture  She was evaluated by her orthopedics in office on 12/24/19 and there was no plan for surgical repair  She presented to the hospital on 12/27/19 for increased pain and was admitted for rehab placement  She was evaluated by orthopedics who performed a R hip revision on 1/2/19  Overnight she appeared more delirious with tachycardia in 120s and a septic work-up was initiated with IV fluids and antibiotics  Around 4:20 am the patient became hypertensive and tachycardic with Hrs in the 170s and SBP in the 90s with rigidity and tremors  Her rectal temp was found to be 103  There was concern for neuroleptic malignant syndrome  She was given a total of 2mg Ativa, 10mg valium x 3 and was started on Dantrolene 100mg with improvement of symptoms  ---------------------------------------------------------------------------------------  SUBJECTIVE  Unable to obtain due to altered mental status  Review of Systems   All other systems reviewed and are negative      Review of systems was unable to be performed secondary to altered mental status  ---------------------------------------------------------------------------------------  Assessment and Plan:    Neuro:    Diagnosis: Neuroleptic Malignant Syndrome  o Received 2mg Ativan, 30mg Valium, 100mg Dantrolene with improving rigidity  o Consult to neurology, appreciate recommendations  o Consult to toxicology, appreciate recommendations     Schizoaffective disorder  o Takes Geodon, Paxil and Seroquel at home with PRN ativan   o On hold 2/2 above        CV:    Diagnosis: Tachycardia  o Initial concern for infection, given IV fluid bolus and started on beanbag by primary team antibiotics have been discontinued  o Cultures pending  o Lactic 2 4 --> 3 7, continue to trend until <2  o CXR wnl  o 2D Echo pending        Pulm:   Diagnosis: Shortness of Breath  o Plan: CXR wnl, continue to wean NC as tolerated, maintain sats >74%      GI:   Diagnosis: No acute issues  o Plan: currently NPO      :    Diagnosis: Elevated CK  o Plan: CK 1656, UOP 2mL/kg/hr, Cr 0 94, continue to monitor renal function        F/E/N:    Fluids: on 150cc/hr LR   Electrolytes: stable   Nutrition: Currently NPO      Heme/Onc:   o No active issues      Endo:    Diagnosis: Hypothyroidism  o Plan: Continue home synthroid        ID:    Diagnosis: No signs of infection  o Plan: Continue off abx, f/u culture results, trend WBC and fever cuve      MSK/Skin:    Diagnosis: S/P R hip revision  o Plan: WBAT RLE, orthopedics following   OOB as tolerated, monitor for signs of skin breakdown      Disposition: Continue Critical Care   Code Status: Level 1 - Full Code  ---------------------------------------------------------------------------------------  ICU CORE MEASURES    Prophylaxis   VTE Pharmacologic Prophylaxis: Enoxaparin (Lovenox)  VTE Mechanical Prophylaxis: sequential compression device  Stress Ulcer Prophylaxis: Pantoprazole PO    ABCDE Protocol (if indicated)  Plan to perform spontaneous awakening trial today? Not applicable  Plan to perform spontaneous breathing trial today? Not applicable  Obvious barriers to extubation?  Not applicable  CAM-ICU: Negative    Invasive Devices Review  Invasive Devices     Peripheral Intravenous Line            Peripheral IV 01/02/20 Left Antecubital less than 1 day    Peripheral IV 01/03/20 Right Forearm less than 1 day          Drain            Urethral Catheter Non-latex 16 Fr  less than 1 day              Can any invasive devices be discontinued today? No (provide explanation): necessary for treatment  ---------------------------------------------------------------------------------------  OBJECTIVE    Physical Exam   Constitutional: She appears well-developed and well-nourished  HENT:   Head: Normocephalic and atraumatic  Eyes: Pupils are equal, round, and reactive to light  Neck: Normal range of motion  Neck supple  Cardiovascular: Regular rhythm  Exam reveals no gallop and no friction rub  No murmur heard  tachycardic   Pulmonary/Chest: Effort normal and breath sounds normal    Abdominal: Soft  Bowel sounds are normal  She exhibits no distension  There is no tenderness  There is no guarding  Musculoskeletal:   Bilateral upper extremity rigidity with arms extended over head   Neurological: She is alert  Alert oriented x 1, following commands, GCS 14 for confusion   Skin: Skin is warm and dry  Vitals   Vitals:    20 0616 20 0625 20 0640 20 0800   BP:    113/69   BP Location:    Right arm   Pulse:    92   Resp:    16   Temp: (!) 102 2 °F (39 °C) (!) 102 2 °F (39 °C) (!) 101 2 °F (38 4 °C) 99 °F (37 2 °C)   TempSrc: Rectal Probe Probe Probe   SpO2:    100%   Weight:       Height:         Temp (24hrs), Av 2 °F (37 9 °C), Min:97 °F (36 1 °C), Max:103 9 °F (39 9 °C)  Current: Temperature: 99 °F (37 2 °C)      Respiratory:  SpO2: SpO2: 100 %  O2 Flow Rate (L/min): 4 L/min    Invasive/non-invasive ventilation settings   Respiratory    Lab Data (Last 4 hours)    None         O2/Vent Data (Last 4 hours)    None                Height and Weights   Height: 5' 3" (160 cm)  IBW: 52 4 kg  Body mass index is 27 81 kg/m²  Weight (last 2 days)     None          Intake and Output  I/O        07    P  O  820      I  V  (mL/kg)  3300 (46 3) 940 (13 2)    Blood  700     IV Piggyback  750 1200    Total Intake(mL/kg) 820 (11 5) 4750 (66 7) 2140 (30 1)    Urine (mL/kg/hr) 4650 (2 7) 3335 (2) 175 (1)    Stool 0      Blood  900     Total Output 4650 4235 175    Net -3830 +515 +1965           Unmeasured Stool Occurrence 1 x          UOP: 2 ml/kg/hr     Nutrition       Diet Orders   (From admission, onward)             Start     Ordered    01/03/20 0520  Diet NPO  Diet effective midnight     Question Answer Comment   Diet Type NPO    RD to adjust diet per protocol?  No        01/03/20 0519                  Laboratory and Diagnostics:  Results from last 7 days   Lab Units 01/03/20 0435 01/02/20 1920 01/02/20 1756 01/02/20 1700 01/02/20  1604 01/02/20  1553 01/02/20  0523 01/01/20  0542 12/28/19  0435 12/27/19  2103   WBC Thousand/uL 10 47* 11 99*  --   --   --   --  3 38* 3 18*  --  5 22   HEMOGLOBIN g/dL 9 3* 11 6  --   --   --   --  11 6 11 8  --  11 4*   I STAT HEMOGLOBIN g/dl  --   --  10 5* 10 9* 8 8* 5 8*  --   --   --   --    HEMATOCRIT % 27 9* 35 5  --   --   --   --  36 3 36 9  --  34 4*   HEMATOCRIT, ISTAT %  --   --  31* 32* 26* 17*  --   --   --   --    PLATELETS Thousands/uL 356 429*  --   --   --   --  367 362 419* 463*   NEUTROS PCT %  --   --   --   --   --   --  61 66  --  74   MONOS PCT %  --   --   --   --   --   --  10 11  --  12     Results from last 7 days   Lab Units 01/03/20 0454 01/02/20 2230 01/02/20 1756 01/02/20  1700 01/02/20  1604 01/02/20  1553 01/02/20  0523 01/01/20  0542 12/28/19  0435 12/27/19 2103   SODIUM mmol/L 140 136  --   --   --   --  134* 132* 132* 132*   POTASSIUM mmol/L 4 8 4 3  --   --   --   --  4 2 3 6 3 5 3 2*   CHLORIDE mmol/L 108 105  --   --   --   --  96* 94* 97* 94*   CO2 mmol/L 22 24  --   --   --   --  34* 34* 32 33*   CO2, I-STAT mmol/L  --   --  27 28 28 20*  --   --   --   --    ANION GAP mmol/L 10 7  --   --   --   --  4 4 3* 5   BUN mg/dL 9 8  --   --   --   --  9 8 4* 4* CREATININE mg/dL 0 94 0 71  --   --   --   --  0 60 0 58* 0 47* 0 51*   CALCIUM mg/dL 9 5 8 8  --   --   --   --  9 9 9 7 9 5 9 1   GLUCOSE RANDOM mg/dL 144* 159*  --   --   --   --  84 77 93 107          Results from last 7 days   Lab Units 01/02/20  0523   INR  1 01   PTT seconds 34      Results from last 7 days   Lab Units 01/03/20  0328   TROPONIN I ng/mL <0 02     Results from last 7 days   Lab Units 01/03/20  0738 01/03/20  0336   LACTIC ACID mmol/L 3 7* 3 4*     ABG:  Results from last 7 days   Lab Units 01/02/20  2230   PH ART  7 523*   PCO2 ART mm Hg 28 8*   PO2 ART mm Hg 112 1   HCO3 ART mmol/L 23 1   BASE EXC ART mmol/L 0 9   ABG SOURCE  Radial, Left     VBG:  Results from last 7 days   Lab Units 01/02/20  2230   ABG SOURCE  Radial, Left     Results from last 7 days   Lab Units 01/03/20  0328   PROCALCITONIN ng/ml <0 05       Micro  Results from last 7 days   Lab Units 01/03/20  0336 01/03/20  0335   BLOOD CULTURE  Received in Microbiology Lab  Culture in Progress  Received in Microbiology Lab  Culture in Progress  EKG: NSR  Imaging: CXR no cardiopulmonary disease I have personally reviewed pertinent reports     and I have personally reviewed pertinent films in PACS    Active Medications  Scheduled Meds:  Current Facility-Administered Medications:  acetaminophen 650 mg Rectal Q4H PRN Ender Prince PA-C    acetaminophen 975 mg Oral [facility] Hallie Alvarez PA-C    aluminum-magnesium hydroxide-simethicone 30 mL Oral Q4H PRN Hallie Alvarez PA-C    ascorbic acid 500 mg Oral BID Hallie Alvarez PA-C    calcium carbonate 1 tablet Oral BID With Meals Hallie Alvarez PA-C    calcium carbonate 500 mg Oral Daily PRN Hallie Alvarez PA-C    cholecalciferol 2,000 Units Oral Daily Hallie Alvarez PA-C    docusate sodium 100 mg Oral BID Hallie Alvarez PA-C    enoxaparin 40 mg Subcutaneous Q24H [facility] Hallie Alvarez PA-C    ferrous sulfate 325 mg Oral BID With Meals Hallie Alvarez PA-C    folic acid 1 mg Oral Daily Jovanyjohn CORY Ramirez    gabapentin 100 mg Oral TID Zeinafrankyjohn LINDA Ramirez-WAYNE    HYDROmorphone 0 2 mg Intravenous Q4H PRN Zeinafrankyjohn LINDA Ramirez-WAYNE    iron sucrose 300 mg Intravenous Daily Khadijah Ashley, PA-C    lactated ringers 150 mL/hr Intravenous Continuous Davonte Bravo PA-C Last Rate: 150 mL/hr (01/03/20 0730)   levothyroxine 25 mcg Oral Early Morning LINDA Person-C    lidocaine 2 patch Topical Daily Jovanyene Ashley, PA-C    LORazepam 2 mg Oral Q8H PRN Kathalene Ashley, PA-C    methocarbamol 750 mg Oral Q6H [address] Jovanyjohn LINDA Ramirez-C    multivitamin-minerals 1 tablet Oral Daily Zeinafrankyjohn Ashley, PA-C    oxyCODONE 5 mg Oral Q4H PRN Zeinafrankyjohn Ashley, PA-C    oxyCODONE 10 mg Oral Q4H PRN Zeinafrankyjohn LINDA Ramirez-C    pantoprazole 40 mg Oral BID AC LINDA Person-C      Continuous Infusions:    lactated ringers 150 mL/hr Last Rate: 150 mL/hr (01/03/20 0730)     PRN Meds:     acetaminophen 650 mg Q4H PRN   aluminum-magnesium hydroxide-simethicone 30 mL Q4H PRN   calcium carbonate 500 mg Daily PRN   HYDROmorphone 0 2 mg Q4H PRN   LORazepam 2 mg Q8H PRN   oxyCODONE 5 mg Q4H PRN   oxyCODONE 10 mg Q4H PRN       Allergies   Allergies   Allergen Reactions    Risperdal [Risperidone] Shortness Of Breath     Rapid heart beat, SOB    Zyprexa [Olanzapine] Shortness Of Breath     Rapid heartbeat    Latex Rash     Band aids, adhesives wears normal underwear, can eat bananas  USE PAPER TAPE       Advance Directive and Living Will:      Power of :    POLST:      Counseling / Coordination of Care  Total Critical Care time spent 30 minutes excluding procedures, teaching and family updates  Shwetha Judd PA-C      Portions of the record may have been created with voice recognition software  Occasional wrong word or "sound a like" substitutions may have occurred due to the inherent limitations of voice recognition software    Read the chart carefully and recognize, using context, where substitutions have occurred

## 2020-01-03 NOTE — CONSULTS
Consult- Helena Alejandra 1960, 61 y o  female MRN: [id number]  Unit/Bed#: OR [place] Encounter: [id number]    Primary Care Provider: Kristina Tesfaye MD   Date and time admitted to hospital: 12/27/2019 10:59 AM    Inpatient consult to Jessy Andrew performed by: Ping Davis PA-C  Consult ordered by: Sridhar Crawford PA-C        * Closed right hip fracture ([facility] )  Assessment & Plan  POD #0 s/p REVISION TOTAL HIP ARTHROPLASTY WITH REPAIR OF [address] (Right)  Management per ortho    Tachycardia  Assessment & Plan  Unclear etiology  Given 500 albumin post-op for hypotension (which is now resolved)   Overall net negative I and O for hospital stay-?component of dehydration  Give additional 500 LR bolus now  D-dimer >2 but patient not hypoxic  SpO2 100% on 2 L  Has been on DVT prophylaxis since admission  Low suspicion for large PE  Consider 2D ECHO if not better with fluids  Check ABG and BMP now    SOB (shortness of breath)  Assessment & Plan  Subjective- no hypoxia or obvious increased WOB on exam  Maintain SpO2 >94% with NC  CXR without edema or infiltrate  ABIMAEL (generalized anxiety disorder)  Assessment & Plan  Continue home medications    Schizoaffective disorder, depressive type ([facility] )  Assessment & Plan  Continue home medications    Chronic hyponatremia  Assessment & Plan  Trend Na daily    -------------------------------------------------------------------------------------------------------------  Chief Complaint: Shortness of breath    History of Present Illness   HX and PE limited by: mental status and participation in exam    Helena Alejandra is a 61 y o  female who presents with complaints of ambulatory dysfunction and right hip pain  The patient had undergone a right total hip arthroplasty on December 11th discharged on December 20th to an inpatient rehab    Unfortunately, the patient left against medical advice from her rehab facility and had a mechanical fall at home  She was evaluated by her orthopedic surgeon on December 24th  There was no plan for repeat operation at that time  Patient had continued pain and presented to the hospital for pain control and a request for inpatient rehab placement  Patient was seen by Orthopedic surgery and ultimately the decision was made to proceed with surgery  She underwent repeat surgery today  Postoperatively, the patient was noted to be hypotensive and tachycardic  She was given 500 of albumin by anesthesia with resolution of her hypotension  She was complaining of subjective shortness of breath although with no evidence of hypoxia  Her tachycardia was also persistent  Therefore critical Care was asked to evaluate this patient  At the time my examination, the patient's only complaint is of her right hip pain and some subjective shortness of breath  History obtained from chart review and the patient   -------------------------------------------------------------------------------------------------------------  Dispo: Transfer to Stepdown Level 1     Code Status: Level 1 - Full Code  --------------------------------------------------------------------------------------------------------------  Review of Systems   Respiratory: Positive for shortness of breath  Negative for chest tightness  Cardiovascular: Negative for chest pain  Physical Exam   Constitutional: She appears well-developed and well-nourished  Non-toxic appearance  No distress  Cardiovascular: Regular rhythm, S1 normal and S2 normal  Tachycardia present  Exam reveals no gallop and no friction rub  No murmur heard  Pulmonary/Chest: Effort normal and breath sounds normal  No tachypnea  No respiratory distress  She has no wheezes  She has no rhonchi  She has no rales  Abdominal: Soft  Neurological: She is alert  Skin: Skin is warm and dry  She is not diaphoretic  Psychiatric: Her speech is delayed   She is slowed  Cognition and memory are impaired  --------------------------------------------------------------------------------------------------------------  Historical Information   Past Medical History:   Diagnosis Date    Anxiety     Back pain at L4-L5 level     Schreiber esophagus     Bulimia     Disease of thyroid gland     hypothyroid    GERD (gastroesophageal reflux disease)     Hyperlipidemia     Hypothyroidism     Leukopenia     Rheumatoid arthritis involving right hip (HCC)     Sciatica     Seizure (HCC)     due to medication mix up 8/2018 only one historically    Synovial cyst of lumbar spine     Vertigo     Weight gain      Past Surgical History:   Procedure Laterality Date    BONE MARROW BIOPSY      BREAST SURGERY      enlargement     COLONOSCOPY      OH TOTAL HIP ARTHROPLASTY Right 12/11/2019    Procedure: ARTHROPLASTY HIP TOTAL ANTERIOR;  Surgeon: Rock Erin MD;  Location: [facility];  Service: Orthopedics    RHINOPLASTY       Social History   Social History     Substance and Sexual Activity   Alcohol Use Never    Frequency: Never    Binge frequency: Never     Social History     Substance and Sexual Activity   Drug Use No     Social History     Tobacco Use   Smoking Status Never Smoker   Smokeless Tobacco Never Used     Family History:   Family History   Problem Relation Age of Onset    Uterine cancer Mother     Esophageal cancer Father     Colon cancer Maternal Grandmother      Family history unknown    Vitals:   Vitals:    01/02/20 2030 01/02/20 2045 01/02/20 2100 01/02/20 2115   BP: 133/79 138/74 141/71 122/76   Pulse: (!) 124 (!) 124 (!) 122 (!) 120   Resp: 18 14 16 16   Temp:  97 6 °F (36 4 °C)     TempSrc:       SpO2: 99% 99% 100% 99%   Weight:       Height:         Temp  Min: 96 9 °F (36 1 °C)  Max: 99 6 °F (37 6 °C)  IBW: 52 4 kg  Height: 5' 3" (160 cm)  Body mass index is 27 81 kg/m²      Medications:  Current Facility-Administered Medications   Medication Dose Route Frequency    [MAR Hold] acetaminophen (TYLENOL) tablet 975 mg  975 mg Oral Q8H [address]    [MAR Hold] aluminum-magnesium hydroxide-simethicone (MYLANTA) 200-200-20 mg/5 mL oral suspension 30 mL  30 mL Oral Q4H PRN    [MAR Hold] ascorbic acid (VITAMIN C) tablet 500 mg  500 mg Oral BID    [MAR Hold] calcium carbonate (OYSTER SHELL,OSCAL) 500 mg tablet 1 tablet  1 tablet Oral BID With Meals    [MAR Hold] calcium carbonate (TUMS) chewable tablet 500 mg  500 mg Oral Daily PRN    ceFAZolin (ANCEF) IVPB (premix) 2,000 mg  2,000 mg Intravenous On Call To OR    [MAR Hold] cholecalciferol (VITAMIN D3) tablet 2,000 Units  2,000 Units Oral Daily    [MAR Hold] docusate sodium (COLACE) capsule 100 mg  100 mg Oral BID    [MAR Hold] enoxaparin (LOVENOX) subcutaneous injection 40 mg  40 mg Subcutaneous Q24H YUMIKO    fentaNYL (SUBLIMAZE) injection 50 mcg  50 mcg Intravenous Q5 Min PRN    [MAR Hold] ferrous sulfate tablet 325 mg  325 mg Oral BID With Meals    [MAR Hold] folic acid (FOLVITE) tablet 1 mg  1 mg Oral Daily    [MAR Hold] gabapentin (NEURONTIN) capsule 100 mg  100 mg Oral TID    [MAR Hold] HYDROmorphone (DILAUDID) injection 0 2 mg  0 2 mg Intravenous Q4H PRN    HYDROmorphone (DILAUDID) injection 0 5 mg  0 5 mg Intravenous Q5 Min PRN    lactated ringers bolus 500 mL  500 mL Intravenous Once    lactated ringers infusion  75 mL/hr Intravenous Continuous    lactated ringers infusion  75 mL/hr Intravenous Continuous    [MAR Hold] levothyroxine tablet 25 mcg  25 mcg Oral Early Morning    [MAR Hold] lidocaine (LIDODERM) 5 % patch 2 patch  2 patch Topical Daily    [MAR Hold] LORazepam (ATIVAN) tablet 2 mg  2 mg Oral Q8H PRN    [MAR Hold] methocarbamol (ROBAXIN) tablet 750 mg  750 mg Oral Q6H [address]    [MAR Hold] multivitamin-minerals (CENTRUM) tablet 1 tablet  1 tablet Oral Daily    ondansetron (ZOFRAN) injection 4 mg  4 mg Intravenous Once PRN    [MAR Hold] oxyCODONE (ROXICODONE) IR tablet 5 mg  5 mg Oral Q4H PRN    [MAR Hold] oxyCODONE (ROXICODONE) oral solution 10 mg  10 mg Oral Q4H PRN    [MAR Hold] pantoprazole (PROTONIX) EC tablet 40 mg  40 mg Oral BID AC    [MAR Hold] PARoxetine (PAXIL) tablet 60 mg  60 mg Oral QAM    promethazine (PHENERGAN) injection 12 5 mg  12 5 mg Intravenous Once PRN    [MAR Hold] QUEtiapine (SEROquel) tablet 400 mg  400 mg Oral HS    [MAR Hold] ziprasidone (GEODON) capsule 80 mg  80 mg Oral BID     Home medications:  Prior to Admission Medications   Prescriptions Last Dose Informant Patient Reported? Taking?    LORazepam (ATIVAN) 2 mg tablet  Self No No   Sig: TAKE 1 TABLET (2 MG TOTAL) BY MOUTH EVERY 8 (EIGHT) HOURS AS NEEDED FOR ANXIETY   Multiple Vitamin (MULTIVITAMIN) capsule  Self No No   Sig: Take 1 capsule by mouth daily   PARoxetine (PAXIL) 30 mg tablet  Self No No   Sig: Take 2 tablets (60 mg total) by mouth every morning   QUEtiapine (SEROquel) 400 MG tablet  Self No No   Sig: TAKE 1 TABLET (400 MG TOTAL) BY MOUTH DAILY AT BEDTIME   acetaminophen (TYLENOL) 325 mg tablet  Self No No   Sig: Take 2 tablets (650 mg total) by mouth every 6 (six) hours as needed for mild pain or fever   ascorbic acid (VITAMIN C) 500 mg tablet  Self No No   Sig: Take 1 tablet (500 mg total) by mouth 2 (two) times a day   docusate sodium (COLACE) 100 mg capsule  Self No No   Sig: Take 1 capsule (100 mg total) by mouth 2 (two) times a day   enoxaparin (LOVENOX) 40 mg/0 4 mL   No No   Sig: Inject 0 4 mL (40 mg total) under the skin daily Start after surgery   ferrous sulfate 324 (65 Fe) mg  Self No No   Sig: Take 1 tablet (324 mg total) by mouth 2 (two) times a day before meals   ferrous sulfate 325 (65 Fe) mg tablet  Self No No   Sig: TAKE 1 TABLET (325 MG TOTAL) BY MOUTH 2 (TWO) TIMES A DAY FOR 90 DAYS   folic acid (FOLVITE) 1 mg tablet  Self No No   Sig: Take 1 tablet (1 mg total) by mouth daily   gabapentin (NEURONTIN) 100 mg capsule  Self No No   Sig: Take 1 capsule (100 mg total) by mouth every 8 (eight) hours   gabapentin (NEURONTIN) 100 mg capsule  Self No No   Sig: Take 1 capsule (100 mg total) by mouth every 8 (eight) hours   levothyroxine 25 mcg tablet  Self Yes No   Sig: Take 25 mcg by mouth daily  methocarbamol (ROBAXIN) 750 mg tablet  Self No No   Sig: Take 1 tablet (750 mg total) by mouth every 6 (six) hours   omeprazole (PriLOSEC) 40 MG capsule  Self Yes No   Sig: Take 80 mg by mouth 2 (two) times a day     oxyCODONE (ROXICODONE) 5 mg immediate release tablet  Self No No   Sig: Take 1 tablet (5 mg total) by mouth every 4 (four) hours as needed for moderate pain for up to 10 daysMax Daily Amount: 30 mg   oxyCODONE (ROXICODONE) 5 mg immediate release tablet  Self No No   Sig: Take 1 tablet (5 mg total) by mouth every 6 (six) hours as needed for moderate pain for up to 10 daysMax Daily Amount: 20 mg   triamcinolone (KENALOG) 0 1 % cream  Self No No   Sig: APPLY TO AFFECTED AREA TWICE A DAY   ziprasidone (GEODON) 80 mg capsule  Self No No   Sig: Take 1 capsule (80 mg total) by mouth 2 (two) times a day      Facility-Administered Medications: None     Allergies:   Allergies   Allergen Reactions    Risperdal [Risperidone] Shortness Of Breath     Rapid heart beat, SOB    Zyprexa [Olanzapine] Shortness Of Breath     Rapid heartbeat    Latex Rash     Band aids, adhesives wears normal underwear, can eat bananas  USE PAPER TAPE         Laboratory and Diagnostics:  Results from last 7 days   Lab Units 01/02/20  1920 01/02/20  1756 01/02/20  1700 01/02/20  1604 01/02/20  1553 01/02/20  0523 01/01/20  0542 12/28/19  0435 12/27/19  2103   WBC Thousand/uL 11 99*  --   --   --   --  3 38* 3 18*  --  5 22   HEMOGLOBIN g/dL 11 6  --   --   --   --  11 6 11 8  --  11 4*   I STAT HEMOGLOBIN g/dl  --  10 5* 10 9* 8 8* 5 8*  --   --   --   --    HEMATOCRIT % 35 5  --   --   --   --  36 3 36 9  --  34 4*   HEMATOCRIT, ISTAT %  --  31* 32* 26* 17*  --   --   --   --    PLATELETS Thousands/uL 429*  --   --   --   -- 367 362 419* 463*   NEUTROS PCT %  --   --   --   --   --  61 66  --  74   MONOS PCT %  --   --   --   --   --  10 11  --  12     Results from last 7 days   Lab Units 20  1756 20  1700 20  1604 20  1553 20  0523 20  0542 19  0435 19  2103   SODIUM mmol/L  --   --   --   --  134* 132* 132* 132*   POTASSIUM mmol/L  --   --   --   --  4 2 3 6 3 5 3 2*   CHLORIDE mmol/L  --   --   --   --  96* 94* 97* 94*   CO2 mmol/L  --   --   --   --  34* 34* 32 33*   CO2, I-STAT mmol/L  28 28 20*  --   --   --   --    ANION GAP mmol/L  --   --   --   --  4 4 3* 5   BUN mg/dL  --   --   --   --  9 8 4* 4*   CREATININE mg/dL  --   --   --   --  0 60 0 58* 0 47* 0 51*   CALCIUM mg/dL  --   --   --   --  9 9 9 7 9 5 9 1   GLUCOSE RANDOM mg/dL  --   --   --   --  84 77 93 107          Results from last 7 days   Lab Units 20  0523   INR  1 01   PTT seconds 34        Imagin/2 CXR: lungs clear  I have personally reviewed pertinent films in PACS    ------------------------------------------------------------------------------------------------------------  Counseling / Coordination of Care  Total Critical Care time spent 0 minutes excluding procedures, teaching and family updates  Blaine Chang PA-C    Portions of the record may have been created with voice recognition software  Occasional wrong word or "sound a like" substitutions may have occurred due to the inherent limitations of voice recognition software    Read the chart carefully and recognize, using context, where substitutions have occurred

## 2020-01-03 NOTE — ASSESSMENT & PLAN NOTE
Patient started with worsening tachycardia hypertension tremors, delirium and rigidity  It was also discovered that she had rectal temp of 103 degrees F  Concern for NMS  She was given Ativan 2 mg with no significant change and Valium 10 mg with significant improvement in her tremors and rigidity  Patient does take Geodon, Paxil and Seroquel at home  She was given 1 L of lactated Ringer's  Lactic elevated and CK pending    Patient will be transferred to the medical ICU for close monitoring and potential need for dantrolene

## 2020-01-03 NOTE — ANESTHESIA POSTPROCEDURE EVALUATION
Post-Op Assessment Note    CV Status:  Stable  Pain Score: 0    Pain management: adequate     Mental Status:  Alert and confused   Hydration Status:  Euvolemic   PONV Controlled:  Controlled   Airway Patency:  Patent  Airway: intubated   Post Op Vitals Reviewed: Yes      Staff: CRNA           /83 (01/02/20 1900)    Temp (!) 97 °F (36 1 °C) (01/02/20 1900)    Pulse (!) 155(refer to anesthesia mar) (01/02/20 1900)   Resp 21 (01/02/20 1900)    SpO2 99 % (01/02/20 1900)

## 2020-01-03 NOTE — ASSESSMENT & PLAN NOTE
Unclear etiology  Given 500 albumin post-op for hypotension (which is now resolved)   Overall net negative I and O for hospital stay-?component of dehydration  Give additional 500 LR bolus now  D-dimer >2 but patient not hypoxic  SpO2 100% on 2 L  Has been on DVT prophylaxis since admission  Low suspicion for large PE  Consider 2D ECHO if not better with fluids    Check ABG and BMP now

## 2020-01-03 NOTE — PERIOPERATIVE NURSING NOTE
Dr Anson Muñoz and Dr Reji Peraza from anesthesia at bedside to evaluate patient  Patient complaining that she is short of breath

## 2020-01-03 NOTE — PHYSICAL THERAPY NOTE
Physical Therapy Cancellation Note    PT received new orders post op hip revision  Chart review completed  Spoke to nsg, at this time pt is not appropriate to engage in PT re-evaluation with decreased AMS  PT will follow and re-eval as medically appropriate      Manda Rosales, PT

## 2020-01-03 NOTE — CONSULTS
Consultation - Neurology   Michelle Stokes 61 y o  female MRN: [id number]  Unit/Bed#: MICU 10 Encounter: [id number]      Assessment/Plan   Assessment:  Neuroleptic malignant syndrome - likely NMS given constellation of labile blood pressure, muscle rigidity, elevated CK, fever, tachycardia, prescription of multiple antipsychotics  Encephalopathy - consider in the setting of NMS versus metabolic encephalopathy  Hyperlipidemia  Hypothyroidism  Schizoaffective disorder      Plan:  Patient is clinical findings are consistent with neuroleptic malignant syndrome and patient is on Geodon and Seroquel at home per chart review  No known changes to her medication regimen recently however it is possible that she may have been poorly compliant with medications at home  Agree with dantrolene and benzodiazepines  Agree with starting bromocriptine at this time  Will order CT head to assess for possible hemorrhage  Will check spot EEG  Will defer LP pending results of CT head and EEG  Would continue to trend CK and generously hydrate as tolerated  Monitor renal function  Will check TSH  Supportive care her primary team      History of Present Illness     Reason for Consult / Principal Problem: possible NMS vs serotonin syndrome  Hx and PE limited by: encephalopathy    HPI: Michelle Stokes is a 61 y o   female with past medical history significant for schizoaffective disorder, hypothyroidism, hyperlipidemia, GERD iron deficiency  Patient was recently admitted to the hospital for ambulatory dysfunction and right hip pain for which she underwent right hip arthroplasty on 12/11/2019  Patient was recommended to go to inpatient rehab on 12/20/2019 however patient left AMA  Patient had a mechanical fall at home and went to follow up with Orthopedics on 12/24/2019 however no further surgery was planned at the time  Patient came in to the hospital for pain control in hopeful rehab placement    Orthopedics all patient while in the hospital who performed a revision of her right hip arthroplasty with repair of periprosthetic fracture  Patient had an estimated 900 mL of blood loss during this procedure  Patient received [address] a m  During procedure however does not appear to of received succinylcholine or any anesthetics known to cause malignant hyperthermia  In the PACU, patient was found to be hypotensive and tachycardic with a heart rate as high as the 160 to 180s  Patient received 500 cc of albumin with improvement of her blood pressure however patient remained tachycardic  Patient felt dyspneic at the time however was not hypoxic  Postoperatively, patient found to be rigid and therefore there was concern for NMS versus serotonin syndrome  Patient received dantrolene lorazepam and diazepam   Neurology consulted for possibility of NMS versus serotonin syndrome  On examination this morning, patient is encephalopathic and possibly aphasic  She appears to be in mild distress secondary confusion however is unable to verbalize this  Patient does not have waxing and waning of her alertness or wakefulness  Blood pressure has been labile over the past 24 hours as low as 88/60 as high as 176/68  Maximum charted heart rate 158 and T-max 103 9°  Vital signs have improved since administration of dantrolene and benzodiazepines  Lab significant for hemoglobin of 9 3 down from 11 6 yesterday, WBC of 10 5, potassium 4 8, bicarb 22 with anion gap 10, creatinine 0 94, negative procalcitonin, negative troponin, CK 4185, lactic acid 3 7, ABG 7 523/28 8/112 1/23 1/0 9, D-dimer 2 78  Blood cultures pending  EKG was sinus tachycardia with premature atrial complexes  Chest x-ray with no acute cardiopulmonary disease      Consults    Review of Systems   Unable to perform ROS: Mental status change       Historical Information   Past Medical History:   Diagnosis Date    Anxiety     Back pain at L4-L5 level     Schreiber esophagus  Bulimia     Disease of thyroid gland     hypothyroid    GERD (gastroesophageal reflux disease)     Hyperlipidemia     Hypothyroidism     Leukopenia     Rheumatoid arthritis involving right hip (HCC)     Sciatica     Seizure (HCC)     due to medication mix up 8/2018 only one historically    Synovial cyst of lumbar spine     Vertigo     Weight gain      Past Surgical History:   Procedure Laterality Date    BONE MARROW BIOPSY      BREAST SURGERY      enlargement     COLONOSCOPY      HIP ARTHROPLASTY Right 1/2/2020    Procedure: REVISION TOTAL HIP ARTHROPLASTY WITH REPAIR OF PARIPROSTHETIC FRACTURE - POSTERIOR APPROACH;  Surgeon: Lavonne Calderon MD;  Location: [facility];  Service: Orthopedics    [place] TOTAL HIP ARTHROPLASTY Right 12/11/2019    Procedure: ARTHROPLASTY HIP TOTAL ANTERIOR;  Surgeon: Lavonne Calderon MD;  Location: [facility];  Service: Orthopedics    RHINOPLASTY       Social History   Social History     Substance and Sexual Activity   Alcohol Use Never    Frequency: Never    Binge frequency: Never     Social History     Substance and Sexual Activity   Drug Use No     Social History     Tobacco Use   Smoking Status Never Smoker   Smokeless Tobacco Never Used       Meds/Allergies   all current active meds have been reviewed and current meds:   Current Facility-Administered Medications   Medication Dose Route Frequency    acetaminophen (TYLENOL) tablet 975 mg  975 mg Oral Q8H [address]    aluminum-magnesium hydroxide-simethicone (MYLANTA) 200-200-20 mg/5 mL oral suspension 30 mL  30 mL Oral Q4H PRN    bromocriptine (PARLODEL) tablet 2 5 mg  2 5 mg Oral Q12H    calcium carbonate (OYSTER SHELL,OSCAL) 500 mg tablet 1 tablet  1 tablet Oral BID With Meals    calcium carbonate (TUMS) chewable tablet 500 mg  500 mg Oral Daily PRN    cholecalciferol (VITAMIN D3) tablet 2,000 Units  2,000 Units Oral Daily    docusate sodium (COLACE) capsule 100 mg  100 mg Oral BID    enoxaparin (LOVENOX) subcutaneous injection 40 mg  40 mg Subcutaneous Z11S [address]    folic acid (FOLVITE) tablet 1 mg  1 mg Oral Daily    gabapentin (NEURONTIN) capsule 100 mg  100 mg Oral TID    HYDROmorphone (DILAUDID) injection 0 2 mg  0 2 mg Intravenous Q4H PRN    [START ON 1/5/2020] iron sucrose (VENOFER) 300 mg in sodium chloride 0 9 % 250 mL IVPB  300 mg Intravenous Every Other Day    lactated ringers infusion  150 mL/hr Intravenous Continuous    levothyroxine tablet 25 mcg  25 mcg Oral Early Morning    lidocaine (LIDODERM) 5 % patch 2 patch  2 patch Topical Daily    LORazepam (ATIVAN) tablet 2 mg  2 mg Oral Q8H PRN    methocarbamol (ROBAXIN) tablet 750 mg  750 mg Oral Q6H [address]    multivitamin-minerals (CENTRUM) tablet 1 tablet  1 tablet Oral Daily    oxyCODONE (ROXICODONE) IR tablet 5 mg  5 mg Oral Q4H PRN    oxyCODONE (ROXICODONE) oral solution 10 mg  10 mg Oral Q4H PRN    pantoprazole (PROTONIX) EC tablet 40 mg  40 mg Oral BID AC       Allergies   Allergen Reactions    Risperdal [Risperidone] Shortness Of Breath     Rapid heart beat, SOB    Zyprexa [Olanzapine] Shortness Of Breath     Rapid heartbeat    Latex Rash     Band aids, adhesives wears normal underwear, can eat bananas  USE PAPER TAPE       Objective   Vitals:Blood pressure 134/58, pulse (!) 116, temperature (!) 100 6 °F (38 1 °C), temperature source Probe, resp  rate 21, height 5' 3" (1 6 m), weight 71 2 kg (157 lb), SpO2 98 %  ,Body mass index is 27 81 kg/m²  Intake/Output Summary (Last 24 hours) at 1/3/2020 1437  Last data filed at 1/3/2020 1200  Gross per 24 hour   Intake 7490 ml   Output 2885 ml   Net 4605 ml       Invasive Devices:    Invasive Devices     Peripheral Intravenous Line            Peripheral IV 01/02/20 Left Antecubital 1 day    Peripheral IV 01/03/20 Right Forearm less than 1 day          Drain            Urethral Catheter Non-latex 16 Fr  less than 1 day                Physical Exam   Constitutional: She appears well-developed and well-nourished  She appears distressed  HENT:   Head: Normocephalic and atraumatic  Mouth/Throat: Oropharynx is clear and moist  No oropharyngeal exudate  Eyes: Pupils are equal, round, and reactive to light  Conjunctivae are normal  No scleral icterus  Patient unable to follow simple commands, though patient did move extraocular muscles spontaneously   Neck: Normal range of motion  Neck supple  No JVD present  No significant nuchal rigidity in comparison to rigidity throughout body   Cardiovascular: Regular rhythm  No murmur heard  Tachycardic   Pulmonary/Chest: Effort normal and breath sounds normal  She has no wheezes  Abdominal: Soft  Bowel sounds are normal  There is no tenderness  Musculoskeletal: Normal range of motion  She exhibits no edema or deformity  Neurological: She has an abnormal Finger-Nose-Finger Test (pt confused about instuctions, only touched nose, though appeared coordinated)  Reflex Scores:       Bicep reflexes are 1+ on the right side and 1+ on the left side  Brachioradialis reflexes are 1+ on the right side and 1+ on the left side  Patellar reflexes are 1+ on the right side and 1+ on the left side  Neuro exam limited by confusion   Skin: Skin is warm and dry  Capillary refill takes less than 2 seconds  She is not diaphoretic  No erythema  No pallor  Nursing note and vitals reviewed  Neurologic Exam     Mental Status   Oriented to person  (Patient unable to verbalize her location or time)  Attention: normal  Concentration: decreased  Speech: (Patient appears to have expressive aphasia as well as receptive aphasia)  Level of consciousness: alert  Unable to name object  Unable to read  Unable to repeat  Abnormal comprehension  Cranial Nerves     CN II   Right visual field deficit: none  Left visual field deficit: none     CN III, IV, VI   Pupils are equal, round, and reactive to light  Right pupil: Size: 3 mm  Shape: regular  Reactivity: brisk  Accommodation: intact  Left pupil: Size: 3 mm  Shape: regular  Reactivity: brisk  Accommodation: intact     CN III: no CN III palsy  CN VI: no CN VI palsy  Nystagmus: none     CN V   Right facial sensation deficit: none  Left facial sensation deficit: none    CN VII   Right facial weakness: none  Left facial weakness: none    CN VIII   Hearing: intact    CN IX, X   Palate: symmetric    CN XI   Right sternocleidomastoid strength: normal  Left sternocleidomastoid strength: normal  Right trapezius strength: normal  Left trapezius strength: normal    CN XII   Tongue: not atrophic  Fasciculations: present (blepharospasm R lower eyelid)  Tongue deviation: none    Motor Exam   Muscle bulk: normal  Overall muscle tone: normal  Right arm tone: normal  Left arm tone: normal  Right arm pronator drift: absent  Left arm pronator drift: absent  Right leg tone: normal  Left leg tone: normal    Strength   Right neck flexion: 4/5  Left neck flexion: 4/5  Right neck extension: 4/5  Left neck extension: 4/5  Right deltoid: 4/5  Left deltoid: 4/5  Right biceps: 4/5  Left biceps: 4/5  Right triceps: 4/5  Left triceps: 4/5  Right wrist flexion: 4/5  Left wrist flexion: 4/5  Right wrist extension: 4/5  Left wrist extension: 4/5  Right quadriceps: 4/5  Left quadriceps: 4/5  Right hamstrin/5  Left hamstrin/5  Right anterior tibial: 4/5  Left anterior tibial: 4/5  Right posterior tibial: 4/5  Left posterior tibial: 4/5  Right peroneal: 4/5  Left peroneal: 4/5  Right gastroc: 4/5  Left gastroc: 4/5    Sensory Exam   Right arm light touch: normal  Left arm light touch: normal  Right leg light touch: normal  Left leg light touch: normal    Gait, Coordination, and Reflexes     Coordination   Finger to nose coordination: abnormal (pt confused about instuctions, only touched nose, though appeared coordinated)    Tremor   Resting tremor: absent  Intention tremor: absent    Reflexes   Right brachioradialis: 1+  Left brachioradialis: 1+  Right biceps: 1+  Left biceps: 1+  Right patellar: 1+  Left patellar: 1+  Right : 2+  Left : 2+  Right plantar: equivocal  Left plantar: equivocal  Right ankle clonus: absent  Left ankle clonus: absent      Lab Results:   I have personally reviewed pertinent reports  , CBC:   Results from last 7 days   Lab Units 01/03/20  0435 01/02/20  1920 01/02/20  1756  01/02/20  0523   WBC Thousand/uL 10 47* 11 99*  --   --  3 38*   RBC Million/uL 3 28* 4 08  --   --  4 10   HEMOGLOBIN g/dL 9 3* 11 6  --   --  11 6   I STAT HEMOGLOBIN g/dl  --   --  10 5*   < >  --    HEMATOCRIT % 27 9* 35 5  --   --  36 3   HEMATOCRIT, ISTAT %  --   --  31*   < >  --    MCV fL 85 87  --   --  89   PLATELETS Thousands/uL 356 429*  --   --  367    < > = values in this interval not displayed    , BMP/CMP:   Results from last 7 days   Lab Units 01/03/20  0454 01/02/20  2230 01/02/20  1756 01/02/20  1700 01/02/20  1604  01/02/20  0523   SODIUM mmol/L 140 136  --   --   --   --  134*   POTASSIUM mmol/L 4 8 4 3  --   --   --   --  4 2   CHLORIDE mmol/L 108 105  --   --   --   --  96*   CO2 mmol/L 22 24  --   --   --   --  34*   CO2, I-STAT mmol/L  --   --  27 28 28   < >  --    BUN mg/dL 9 8  --   --   --   --  9   CREATININE mg/dL 0 94 0 71  --   --   --   --  0 60   GLUCOSE, ISTAT mg/dl  --   --  155* 153* 119   < >  --    CALCIUM mg/dL 9 5 8 8  --   --   --   --  9 9   EGFR ml/min/1 73sq m 67 94  --   --   --   --  100    < > = values in this interval not displayed  , Vitamin B12:   , HgBA1C:   , TSH:   , Coagulation:   Results from last 7 days   Lab Units 01/02/20  0523   INR  1 01   , Lipid Profile:   , Ammonia:   , Urinalysis:       Invalid input(s): URIBILINOGEN, Drug Screen:   , Medication Drug Levels:       Invalid input(s): CARBAMAZEPINE,  PHENOBARB, LACOSAMIDE, OXCARBAZEPINE  Imaging Studies: I have personally reviewed pertinent reports  EKG, Pathology, and Other Studies: I have personally reviewed pertinent reports  VTE Prophylaxis: Enoxaparin (Lovenox)    Code Status: Level 1 - Full Code  Advance Directive and Living Will:      Power of :    POLST:      Counseling / Coordination of Care  N/A

## 2020-01-03 NOTE — PERIOPERATIVE NURSING NOTE
Dr Dax Luna from [facility] aware of patient  Patient will be held in PACU until step down room available   PACS aware

## 2020-01-03 NOTE — QUICK NOTE
Pt reassessed  Called for HR in 160s-180s  Pt extremely stiff, tremulous, rectal temp taken of 103  Lactate 3 4  Critical care called  Concern for NMS vs serotonin syndrome  Pt to be moved to ICU  Family attempted to be called  No answer  Voicemail left with callback number

## 2020-01-03 NOTE — ASSESSMENT & PLAN NOTE
POD #0 s/p REVISION TOTAL HIP ARTHROPLASTY WITH REPAIR OF PARIPROSTHETIC FRACTURE - POSTERIOR APPROACH (Right)  Management per ortho

## 2020-01-03 NOTE — QUICK NOTE
Notified that anesthesia requested pt to be step down level 1 after coming out of anesthesia diaphoretic, tachy and hypotensive  Pt stable - hypotension resolved  Seen by critical care  Pt continued to receive pain control, IVF bolus without change in tachycardia  Ddimer positive but patient without hypoxia  Upon reassessment, pt confused only oriented to self, and yelling  Elevation in WBC noted  Pt has not received pyschiatric medications today  Part of ativan dose held today  CV- regular  Tachycardic   Pulm - CTA b/l  Skin - mucous membranes moist  No hematoma noted   Neuro - Oriented to self  No focal neuro deficit  Poor to follow commands  Can answer very simple questions  Unclear etiology of tachycardia  With it prolonged after fluids, will begin septic workup  PCT, Blood cultures, lactate  Begin broad spectrum abx  EKG and troponin  Echo ordered  Low threshold for CTA PE if does not improve or becomes hypoxic

## 2020-01-03 NOTE — OCCUPATIONAL THERAPY NOTE
Occupational Therapy Cancellation Note      Patient Name: Marta Brannon  DVMVD'B Date: 1/3/2020      RECEIVED ORDERS + REVIEWED CHART  SPOKE WITH NRSG  PER DISCUSSION WITH RN, PATIENT CURRENTLY NOT APPROPRIATE FOR SKILLED OT INTERVENTIONS AT THIS TIME D/T AMS  OT WILL CONTINUE TO FOLLOW AND COMPLETE IE AS APPROPRIATE        Fabricio Davis MS, OTR/L

## 2020-01-03 NOTE — PROGRESS NOTES
Subjective: Became hypertensive, tachycardiac, rigid, and febrile overnight and transferred to MICU  Has improved after ativan      Lab Results   Component Value Date/Time    WBC 10 47 (H) 01/03/2020 04:35 AM    HGB 9 3 (L) 01/03/2020 04:35 AM       Vitals:    01/03/20 0500   BP: 131/60   Pulse:    Resp:    Temp: (!) 103 9 °F (39 9 °C)   SpO2:      Right lower extremity  Dressing C/D/I  Abduction pillow in place  Toes warm and well perfused with brisk capillary refill    Assessment: 61 y o  female post op day #1 from revision right ELIZABETH and ORIF     Plan:  WBAT RLE  Posterior hip precautions    Trend vitals and labs    Pain control  DVT ppx: Sequential compression device (Venodyne)  and Enoxaparin (Lovenox)  PT/OT  Patient noted to have acute blood loss anemia due to a drop in Hbg of > 2 0g from preop levels, will monitor vital signs and resuscitate with IV fluids as needed  Dispo: pending medical improvement

## 2020-01-03 NOTE — PROGRESS NOTES
Vancomycin Assessment    Cooper Coreas is a 61 y o  female who is currently receiving vancomycin 1000MG IV Q12 (15MG/KG Q12 ADJ BODY WEIGHT) for other SIRS   Relevant clinical data and objective history reviewed:  Creatinine   Date Value Ref Range Status   01/02/2020 0 71 0 60 - 1 30 mg/dL Final     Comment:     Standardized to IDMS reference method   01/02/2020 0 60 0 60 - 1 30 mg/dL Final     Comment:     Standardized to IDMS reference method   01/01/2020 0 58 (L) 0 60 - 1 30 mg/dL Final     Comment:     Standardized to IDMS reference method   06/01/2017 0 83 0 50 - 1 05 mg/dL Final     Comment:     Result Comment: For patients >52years of age, the reference limit  for Creatinine is approximately 13% higher for people  identified as -American  /66 (BP Location: Right arm)   Pulse (!) 126   Temp 98 8 °F (37 1 °C) (Oral)   Resp 20   Ht 5' 3" (1 6 m)   Wt 71 2 kg (157 lb)   SpO2 99%   BMI 27 81 kg/m²   I/O last 3 completed shifts: In: 0617 [P O :820; I V :2800; Blood:700; IV Piggyback:250]  Out: 9852 [Urine:7310; Blood:900]  Lab Results   Component Value Date/Time    BUN 8 01/02/2020 10:30 PM    BUN 7 06/01/2017 06:50 AM    WBC 11 99 (H) 01/02/2020 07:20 PM    HGB 11 6 01/02/2020 07:20 PM    HCT 35 5 01/02/2020 07:20 PM    MCV 87 01/02/2020 07:20 PM     (H) 01/02/2020 07:20 PM     Temp Readings from Last 3 Encounters:   01/02/20 98 8 °F (37 1 °C) (Oral)   12/20/19 (!) 97 2 °F (36 2 °C)   11/25/19 (!) 97 2 °F (36 2 °C) (Temporal)     Vancomycin Days of Therapy: 1    Assessment/Plan  The patient is currently on vancomycin utilizing scheduled dosing based on adjusted body weight (due to obesity)  Baseline risks associated with therapy include: concomitant nephrotoxic medications and dehydration  The patient is currently receiving 1000MG IV Q12 (15MG/KG Q12 ADJ BODY WEIGHT) and is clinically appropriate and dose will be continued    Pharmacy will also follow closely for s/sx of nephrotoxicity, infusion reactions and appropriateness of therapy  BMP and CBC will be ordered per protocol  Plan for trough as patient approaches steady state, prior to the 4th  dose at approximately 1330 01/04/2020  Due to infection severity, will target a trough of 15-20 (appropriate for most indications)   Pharmacy will continue to follow the patients culture results and clinical progress daily      Roman Cheadle, Pharmacist

## 2020-01-03 NOTE — PERIOPERATIVE NURSING NOTE
Anesthesia paged, patient hypotensive, tachycardic and diaphoretic, patient states that her "breath is sleeping " 12 lead EKG done, anesthesia will evaluate patient in PACU

## 2020-01-03 NOTE — ASSESSMENT & PLAN NOTE
POD #1 s/p REVISION TOTAL HIP ARTHROPLASTY WITH REPAIR OF PARIPROSTHETIC FRACTURE - POSTERIOR APPROACH (Right)  Management per ortho

## 2020-01-04 LAB
ALBUMIN SERPL BCP-MCNC: 2.5 G/DL (ref 3.5–5)
ALP SERPL-CCNC: 145 U/L (ref 46–116)
ALT SERPL W P-5'-P-CCNC: 101 U/L (ref 12–78)
ANION GAP SERPL CALCULATED.3IONS-SCNC: 5 MMOL/L (ref 4–13)
ANION GAP SERPL CALCULATED.3IONS-SCNC: 5 MMOL/L (ref 4–13)
ANION GAP SERPL CALCULATED.3IONS-SCNC: 6 MMOL/L (ref 4–13)
ANION GAP SERPL CALCULATED.3IONS-SCNC: 7 MMOL/L (ref 4–13)
AST SERPL W P-5'-P-CCNC: 338 U/L (ref 5–45)
BACTERIA UR QL AUTO: NORMAL /HPF
BASE EXCESS BLDA CALC-SCNC: 1.1 MMOL/L
BILIRUB SERPL-MCNC: 0.44 MG/DL (ref 0.2–1)
BILIRUB UR QL STRIP: NEGATIVE
BUN SERPL-MCNC: 4 MG/DL (ref 5–25)
BUN SERPL-MCNC: 5 MG/DL (ref 5–25)
BUN SERPL-MCNC: 6 MG/DL (ref 5–25)
BUN SERPL-MCNC: 7 MG/DL (ref 5–25)
CALCIUM SERPL-MCNC: 8.1 MG/DL (ref 8.3–10.1)
CALCIUM SERPL-MCNC: 8.2 MG/DL (ref 8.3–10.1)
CALCIUM SERPL-MCNC: 8.3 MG/DL (ref 8.3–10.1)
CALCIUM SERPL-MCNC: 8.5 MG/DL (ref 8.3–10.1)
CHLORIDE SERPL-SCNC: 100 MMOL/L (ref 100–108)
CHLORIDE SERPL-SCNC: 100 MMOL/L (ref 100–108)
CHLORIDE SERPL-SCNC: 101 MMOL/L (ref 100–108)
CHLORIDE SERPL-SCNC: 103 MMOL/L (ref 100–108)
CK MB SERPL-MCNC: 24 NG/ML (ref 0–5)
CK MB SERPL-MCNC: 28.5 NG/ML (ref 0–5)
CK MB SERPL-MCNC: 51.2 NG/ML (ref 0–5)
CK MB SERPL-MCNC: <1 % (ref 0–2.5)
CK SERPL-CCNC: 6734 U/L (ref 26–192)
CK SERPL-CCNC: 8415 U/L (ref 26–192)
CK SERPL-CCNC: ABNORMAL U/L (ref 26–192)
CLARITY UR: CLEAR
CO2 SERPL-SCNC: 25 MMOL/L (ref 21–32)
CO2 SERPL-SCNC: 28 MMOL/L (ref 21–32)
CO2 SERPL-SCNC: 30 MMOL/L (ref 21–32)
CO2 SERPL-SCNC: 30 MMOL/L (ref 21–32)
COLOR UR: YELLOW
CREAT SERPL-MCNC: 0.42 MG/DL (ref 0.6–1.3)
CREAT SERPL-MCNC: 0.46 MG/DL (ref 0.6–1.3)
CREAT SERPL-MCNC: 0.52 MG/DL (ref 0.6–1.3)
CREAT SERPL-MCNC: 0.52 MG/DL (ref 0.6–1.3)
ERYTHROCYTE [DISTWIDTH] IN BLOOD BY AUTOMATED COUNT: 17.2 % (ref 11.6–15.1)
GFR SERPL CREATININE-BSD FRML MDRD: 105 ML/MIN/1.73SQ M
GFR SERPL CREATININE-BSD FRML MDRD: 105 ML/MIN/1.73SQ M
GFR SERPL CREATININE-BSD FRML MDRD: 109 ML/MIN/1.73SQ M
GFR SERPL CREATININE-BSD FRML MDRD: 113 ML/MIN/1.73SQ M
GLUCOSE SERPL-MCNC: 102 MG/DL (ref 65–140)
GLUCOSE SERPL-MCNC: 113 MG/DL (ref 65–140)
GLUCOSE SERPL-MCNC: 114 MG/DL (ref 65–140)
GLUCOSE SERPL-MCNC: 119 MG/DL (ref 65–140)
GLUCOSE SERPL-MCNC: 120 MG/DL (ref 65–140)
GLUCOSE SERPL-MCNC: 122 MG/DL (ref 65–140)
GLUCOSE SERPL-MCNC: 124 MG/DL (ref 65–140)
GLUCOSE UR STRIP-MCNC: NEGATIVE MG/DL
HCO3 BLDA-SCNC: 23.4 MMOL/L (ref 22–28)
HCT VFR BLD AUTO: 22.3 % (ref 34.8–46.1)
HCT VFR BLD AUTO: 22.6 % (ref 34.8–46.1)
HGB BLD-MCNC: 7.6 G/DL (ref 11.5–15.4)
HGB BLD-MCNC: 7.6 G/DL (ref 11.5–15.4)
HGB UR QL STRIP.AUTO: NEGATIVE
HYALINE CASTS #/AREA URNS LPF: NORMAL /LPF
KETONES UR STRIP-MCNC: NEGATIVE MG/DL
LACTATE SERPL-SCNC: 1.5 MMOL/L (ref 0.5–2)
LEUKOCYTE ESTERASE UR QL STRIP: NEGATIVE
MAGNESIUM SERPL-MCNC: 1.6 MG/DL (ref 1.6–2.6)
MCH RBC QN AUTO: 28.8 PG (ref 26.8–34.3)
MCHC RBC AUTO-ENTMCNC: 33.6 G/DL (ref 31.4–37.4)
MCV RBC AUTO: 86 FL (ref 82–98)
NITRITE UR QL STRIP: NEGATIVE
NON-SQ EPI CELLS URNS QL MICRO: NORMAL /HPF
O2 CT BLDA-SCNC: 11.4 ML/DL (ref 16–23)
OXYHGB MFR BLDA: 97.5 % (ref 94–97)
PCO2 BLDA: 28.2 MM HG (ref 36–44)
PH BLDA: 7.54 [PH] (ref 7.35–7.45)
PH UR STRIP.AUTO: 7.5 [PH]
PHOSPHATE SERPL-MCNC: 2.6 MG/DL (ref 2.7–4.5)
PLATELET # BLD AUTO: 227 THOUSANDS/UL (ref 149–390)
PMV BLD AUTO: 8.6 FL (ref 8.9–12.7)
PO2 BLDA: 106.7 MM HG (ref 75–129)
POTASSIUM SERPL-SCNC: 3.2 MMOL/L (ref 3.5–5.3)
POTASSIUM SERPL-SCNC: 3.2 MMOL/L (ref 3.5–5.3)
POTASSIUM SERPL-SCNC: 3.3 MMOL/L (ref 3.5–5.3)
POTASSIUM SERPL-SCNC: 3.4 MMOL/L (ref 3.5–5.3)
PROT SERPL-MCNC: 5.3 G/DL (ref 6.4–8.2)
PROT UR STRIP-MCNC: NEGATIVE MG/DL
RBC # BLD AUTO: 2.64 MILLION/UL (ref 3.81–5.12)
RBC #/AREA URNS AUTO: NORMAL /HPF
SODIUM SERPL-SCNC: 134 MMOL/L (ref 136–145)
SODIUM SERPL-SCNC: 135 MMOL/L (ref 136–145)
SODIUM SERPL-SCNC: 135 MMOL/L (ref 136–145)
SODIUM SERPL-SCNC: 136 MMOL/L (ref 136–145)
SP GR UR STRIP.AUTO: 1.01 (ref 1–1.03)
SPECIMEN SOURCE: ABNORMAL
UROBILINOGEN UR QL STRIP.AUTO: 0.2 E.U./DL
WBC # BLD AUTO: 10.24 THOUSAND/UL (ref 4.31–10.16)
WBC #/AREA URNS AUTO: NORMAL /HPF

## 2020-01-04 PROCEDURE — 82550 ASSAY OF CK (CPK): CPT | Performed by: PHYSICIAN ASSISTANT

## 2020-01-04 PROCEDURE — 82805 BLOOD GASES W/O2 SATURATION: CPT | Performed by: PHYSICIAN ASSISTANT

## 2020-01-04 PROCEDURE — 83735 ASSAY OF MAGNESIUM: CPT | Performed by: PHYSICIAN ASSISTANT

## 2020-01-04 PROCEDURE — 80048 BASIC METABOLIC PNL TOTAL CA: CPT | Performed by: PHYSICIAN ASSISTANT

## 2020-01-04 PROCEDURE — 99232 SBSQ HOSP IP/OBS MODERATE 35: CPT | Performed by: EMERGENCY MEDICINE

## 2020-01-04 PROCEDURE — 99233 SBSQ HOSP IP/OBS HIGH 50: CPT | Performed by: PSYCHIATRY & NEUROLOGY

## 2020-01-04 PROCEDURE — 84100 ASSAY OF PHOSPHORUS: CPT | Performed by: PHYSICIAN ASSISTANT

## 2020-01-04 PROCEDURE — 82553 CREATINE MB FRACTION: CPT | Performed by: PHYSICIAN ASSISTANT

## 2020-01-04 PROCEDURE — 83605 ASSAY OF LACTIC ACID: CPT | Performed by: PHYSICIAN ASSISTANT

## 2020-01-04 PROCEDURE — 82948 REAGENT STRIP/BLOOD GLUCOSE: CPT

## 2020-01-04 PROCEDURE — 85014 HEMATOCRIT: CPT | Performed by: PHYSICIAN ASSISTANT

## 2020-01-04 PROCEDURE — 81001 URINALYSIS AUTO W/SCOPE: CPT | Performed by: PHYSICIAN ASSISTANT

## 2020-01-04 PROCEDURE — 80053 COMPREHEN METABOLIC PANEL: CPT | Performed by: PHYSICIAN ASSISTANT

## 2020-01-04 PROCEDURE — 85027 COMPLETE CBC AUTOMATED: CPT | Performed by: PHYSICIAN ASSISTANT

## 2020-01-04 PROCEDURE — 36600 WITHDRAWAL OF ARTERIAL BLOOD: CPT

## 2020-01-04 PROCEDURE — 85018 HEMOGLOBIN: CPT | Performed by: PHYSICIAN ASSISTANT

## 2020-01-04 PROCEDURE — 99024 POSTOP FOLLOW-UP VISIT: CPT | Performed by: ORTHOPAEDIC SURGERY

## 2020-01-04 PROCEDURE — 99233 SBSQ HOSP IP/OBS HIGH 50: CPT | Performed by: INTERNAL MEDICINE

## 2020-01-04 RX ORDER — DIAZEPAM 5 MG/ML
10 INJECTION, SOLUTION INTRAMUSCULAR; INTRAVENOUS EVERY 6 HOURS
Status: DISCONTINUED | OUTPATIENT
Start: 2020-01-04 | End: 2020-01-05

## 2020-01-04 RX ORDER — DIAZEPAM 5 MG/ML
10 INJECTION, SOLUTION INTRAMUSCULAR; INTRAVENOUS EVERY 8 HOURS
Status: DISCONTINUED | OUTPATIENT
Start: 2020-01-04 | End: 2020-01-04

## 2020-01-04 RX ORDER — DIAZEPAM 5 MG/ML
10 INJECTION, SOLUTION INTRAMUSCULAR; INTRAVENOUS ONCE
Status: COMPLETED | OUTPATIENT
Start: 2020-01-04 | End: 2020-01-04

## 2020-01-04 RX ORDER — POTASSIUM CHLORIDE 20 MEQ/1
40 TABLET, EXTENDED RELEASE ORAL ONCE
Status: COMPLETED | OUTPATIENT
Start: 2020-01-04 | End: 2020-01-04

## 2020-01-04 RX ORDER — SODIUM CHLORIDE, SODIUM LACTATE, POTASSIUM CHLORIDE, CALCIUM CHLORIDE 600; 310; 30; 20 MG/100ML; MG/100ML; MG/100ML; MG/100ML
200 INJECTION, SOLUTION INTRAVENOUS CONTINUOUS
Status: DISCONTINUED | OUTPATIENT
Start: 2020-01-04 | End: 2020-01-04

## 2020-01-04 RX ORDER — POTASSIUM CHLORIDE 20 MEQ/1
20 TABLET, EXTENDED RELEASE ORAL
Status: COMPLETED | OUTPATIENT
Start: 2020-01-04 | End: 2020-01-04

## 2020-01-04 RX ORDER — BROMOCRIPTINE MESYLATE 2.5 MG/1
5 TABLET ORAL EVERY 12 HOURS
Status: DISCONTINUED | OUTPATIENT
Start: 2020-01-04 | End: 2020-01-05

## 2020-01-04 RX ORDER — OXYCODONE HCL 5 MG/5 ML
5 SOLUTION, ORAL ORAL EVERY 4 HOURS PRN
Status: DISCONTINUED | OUTPATIENT
Start: 2020-01-04 | End: 2020-01-14 | Stop reason: HOSPADM

## 2020-01-04 RX ORDER — SODIUM CHLORIDE, SODIUM LACTATE, POTASSIUM CHLORIDE, CALCIUM CHLORIDE 600; 310; 30; 20 MG/100ML; MG/100ML; MG/100ML; MG/100ML
50 INJECTION, SOLUTION INTRAVENOUS CONTINUOUS
Status: DISCONTINUED | OUTPATIENT
Start: 2020-01-04 | End: 2020-01-05

## 2020-01-04 RX ORDER — POTASSIUM CHLORIDE 20 MEQ/1
20 TABLET, EXTENDED RELEASE ORAL ONCE
Status: COMPLETED | OUTPATIENT
Start: 2020-01-04 | End: 2020-01-04

## 2020-01-04 RX ORDER — OXYCODONE HYDROCHLORIDE 5 MG/1
2.5 TABLET ORAL EVERY 4 HOURS PRN
Status: ACTIVE | OUTPATIENT
Start: 2020-01-04 | End: 2020-01-06

## 2020-01-04 RX ADMIN — DIAZEPAM 10 MG: 10 INJECTION, SOLUTION INTRAMUSCULAR; INTRAVENOUS at 00:45

## 2020-01-04 RX ADMIN — METHOCARBAMOL 750 MG: 750 TABLET, FILM COATED ORAL at 23:01

## 2020-01-04 RX ADMIN — GABAPENTIN 100 MG: 100 CAPSULE ORAL at 20:40

## 2020-01-04 RX ADMIN — POTASSIUM CHLORIDE 20 MEQ: 1500 TABLET, EXTENDED RELEASE ORAL at 14:12

## 2020-01-04 RX ADMIN — DIAZEPAM 10 MG: 10 INJECTION, SOLUTION INTRAMUSCULAR; INTRAVENOUS at 14:13

## 2020-01-04 RX ADMIN — DIAZEPAM 10 MG: 10 INJECTION, SOLUTION INTRAMUSCULAR; INTRAVENOUS at 08:06

## 2020-01-04 RX ADMIN — ACETAMINOPHEN 975 MG: 325 TABLET ORAL at 05:05

## 2020-01-04 RX ADMIN — GABAPENTIN 100 MG: 100 CAPSULE ORAL at 08:06

## 2020-01-04 RX ADMIN — DIAZEPAM 10 MG: 10 INJECTION, SOLUTION INTRAMUSCULAR; INTRAVENOUS at 20:40

## 2020-01-04 RX ADMIN — BROMOCRIPTINE MESYLATE 5 MG: 2.5 TABLET ORAL at 22:57

## 2020-01-04 RX ADMIN — CALCIUM CARBONATE (ANTACID) CHEW TAB 500 MG 500 MG: 500 CHEW TAB at 08:31

## 2020-01-04 RX ADMIN — PANTOPRAZOLE SODIUM 40 MG: 20 TABLET, DELAYED RELEASE ORAL at 05:05

## 2020-01-04 RX ADMIN — HYALURONIDASE (HUMAN RECOMBINANT) 150 UNITS: 150 INJECTION, SOLUTION SUBCUTANEOUS at 09:30

## 2020-01-04 RX ADMIN — GABAPENTIN 100 MG: 100 CAPSULE ORAL at 16:11

## 2020-01-04 RX ADMIN — POTASSIUM CHLORIDE 20 MEQ: 1500 TABLET, EXTENDED RELEASE ORAL at 08:07

## 2020-01-04 RX ADMIN — OXYCODONE HYDROCHLORIDE 10 MG: 5 SOLUTION ORAL at 05:05

## 2020-01-04 RX ADMIN — LIDOCAINE 2 PATCH: 50 PATCH CUTANEOUS at 08:07

## 2020-01-04 RX ADMIN — SODIUM CHLORIDE, SODIUM LACTATE, POTASSIUM CHLORIDE, AND CALCIUM CHLORIDE 100 ML/HR: .6; .31; .03; .02 INJECTION, SOLUTION INTRAVENOUS at 17:13

## 2020-01-04 RX ADMIN — CALCIUM 1 TABLET: 500 TABLET ORAL at 08:30

## 2020-01-04 RX ADMIN — SODIUM BICARBONATE 200 ML/HR: 84 INJECTION, SOLUTION INTRAVENOUS at 08:30

## 2020-01-04 RX ADMIN — ACETAMINOPHEN 975 MG: 325 TABLET ORAL at 21:23

## 2020-01-04 RX ADMIN — DOCUSATE SODIUM 100 MG: 100 CAPSULE, LIQUID FILLED ORAL at 17:12

## 2020-01-04 RX ADMIN — METHOCARBAMOL 750 MG: 750 TABLET, FILM COATED ORAL at 17:12

## 2020-01-04 RX ADMIN — DIAZEPAM 10 MG: 10 INJECTION, SOLUTION INTRAMUSCULAR; INTRAVENOUS at 02:49

## 2020-01-04 RX ADMIN — ENOXAPARIN SODIUM 40 MG: 40 INJECTION SUBCUTANEOUS at 08:05

## 2020-01-04 RX ADMIN — DOCUSATE SODIUM 100 MG: 100 CAPSULE, LIQUID FILLED ORAL at 08:07

## 2020-01-04 RX ADMIN — SODIUM CHLORIDE, SODIUM LACTATE, POTASSIUM CHLORIDE, AND CALCIUM CHLORIDE 200 ML/HR: .6; .31; .03; .02 INJECTION, SOLUTION INTRAVENOUS at 12:06

## 2020-01-04 RX ADMIN — CALCIUM 1 TABLET: 500 TABLET ORAL at 16:12

## 2020-01-04 RX ADMIN — SODIUM BICARBONATE 200 ML/HR: 84 INJECTION, SOLUTION INTRAVENOUS at 01:10

## 2020-01-04 RX ADMIN — POTASSIUM CHLORIDE 20 MEQ: 1500 TABLET, EXTENDED RELEASE ORAL at 10:10

## 2020-01-04 RX ADMIN — HYDROMORPHONE HYDROCHLORIDE 0.2 MG: 1 INJECTION, SOLUTION INTRAMUSCULAR; INTRAVENOUS; SUBCUTANEOUS at 08:31

## 2020-01-04 RX ADMIN — POTASSIUM CHLORIDE 40 MEQ: 1500 TABLET, EXTENDED RELEASE ORAL at 18:21

## 2020-01-04 RX ADMIN — Medication 1 TABLET: at 08:07

## 2020-01-04 RX ADMIN — LORAZEPAM 2 MG: 1 TABLET ORAL at 16:11

## 2020-01-04 RX ADMIN — BROMOCRIPTINE MESYLATE 2.5 MG: 2.5 TABLET ORAL at 11:44

## 2020-01-04 RX ADMIN — POTASSIUM CHLORIDE 20 MEQ: 1500 TABLET, EXTENDED RELEASE ORAL at 11:44

## 2020-01-04 RX ADMIN — ACETAMINOPHEN 975 MG: 325 TABLET ORAL at 14:12

## 2020-01-04 RX ADMIN — POTASSIUM CHLORIDE 20 MEQ: 1500 TABLET, EXTENDED RELEASE ORAL at 22:57

## 2020-01-04 RX ADMIN — PANTOPRAZOLE SODIUM 40 MG: 20 TABLET, DELAYED RELEASE ORAL at 16:11

## 2020-01-04 RX ADMIN — METHOCARBAMOL 750 MG: 750 TABLET, FILM COATED ORAL at 05:05

## 2020-01-04 RX ADMIN — METHOCARBAMOL 750 MG: 750 TABLET, FILM COATED ORAL at 11:43

## 2020-01-04 RX ADMIN — MELATONIN 2000 UNITS: at 08:06

## 2020-01-04 RX ADMIN — LEVOTHYROXINE SODIUM 25 MCG: 25 TABLET ORAL at 05:05

## 2020-01-04 RX ADMIN — FOLIC ACID 1 MG: 1 TABLET ORAL at 08:30

## 2020-01-04 NOTE — PROGRESS NOTES
Neurology Progress Note - Bob Dakin 1960, 61 y o  female   MRN: [id number] [facility] [id number]        Encephalopathy acute  Assessment & Plan  She remains confused, disoriented, encephalopathic  Unable to follow 2 part commands at this time  Less anxiety reported by nursing staff this am  Continue w meds    Non-traumatic rhabdomyolysis  Assessment & Plan  LAbs improved today, CK starting to trend down  11,300 c/w 11,900 (peak) last evening  Will follow      Subjective/Objective     Subjective:  I feel funny    ROS: She denies c/o headache, unreliable witness for ROS  Staff report continued confusion restless      Current Facility-Administered Medications:  acetaminophen 975 mg Oral Q8H [facility]   aluminum-magnesium hydroxide-simethicone 30 mL Oral Q4H PRN   bromocriptine 2 5 mg Oral Q12H   calcium carbonate 1 tablet Oral BID With Meals   calcium carbonate 500 mg Oral Daily PRN   cholecalciferol 2,000 Units Oral Daily   docusate sodium 100 mg Oral BID   enoxaparin 40 mg Subcutaneous [id number]   folic acid 1 mg Oral Daily   gabapentin 100 mg Oral TID   hyaluronidase 150 Units Subcutaneous Once   HYDROmorphone 0 2 mg Intravenous Q4H PRN   [START ON 1/5/2020] iron sucrose 300 mg Intravenous Every Other Day   levothyroxine 25 mcg Oral Early Morning   lidocaine 2 patch Topical Daily   LORazepam 2 mg Oral Q8H PRN   methocarbamol 750 mg Oral Q6H [facility]   multivitamin-minerals 1 tablet Oral Daily   oxyCODONE 5 mg Oral Q4H PRN   oxyCODONE 10 mg Oral Q4H PRN   pantoprazole 40 mg Oral BID AC   potassium chloride 20 mEq Oral Q2H   sodium bicarbonate infusion 200 mL/hr Intravenous Continuous     aluminum-magnesium hydroxide-simethicone    calcium carbonate    HYDROmorphone    LORazepam    oxyCODONE    oxyCODONE    Vitals: Blood pressure 140/95, pulse (!) 112, temperature 99 7 °F (37 6 °C), temperature source Rectal, resp   rate 17, height 5' 3" (1 6 m), weight 74 6 kg (164 lb 7 4 oz), SpO2 95 % ,Body mass index is 29 13 kg/m²  Physical Exam:     Israel Zacarias seen in: ICU bed, no family at bedside  General appearance: alert, diaphoretic generally   Neck, Lungs, Heart, & abdomen: WNL  Extremities: atraumatic, no cyanosis or edema    Neurologic:   Mental status: Alert, Anxious appearing, oriented only to name and age, preservative w answers , thought content confused, difficulty w even simple 2 part commands  No dysarthria  CN: benigh exam  JYOTSNA slowly EOM's I, Gaze conjugate  Non lateralizing sensory & motor exam, (PP not tested on face)  Reminder CNVIII-XII limited by oral exam     Motor: full power age appropriate x upper limbs, limited lower exam r/t hip positioning  Sensory: grossly intact  X 4 limbs, PP not tested  Cerebellar: tremulous overall   Gait:not appropriate at this time, NWB R  DTR's: Age appropriate,  Plantars: downgoing       Lab Results:   I have personally reviewed pertinent reports  , CBC:   Results from last 7 days   Lab Units 01/04/20  0846 01/03/20  0435 01/02/20  1920   WBC Thousand/uL 10 24* 10 47* 11 99*   RBC Million/uL 2 64* 3 28* 4 08   HEMOGLOBIN g/dL 7 6* 9 3* 11 6   HEMATOCRIT % 22 6* 27 9* 35 5   MCV fL 86 85 87   PLATELETS Thousands/uL 227 356 429*   , BMP/CMP:   Results from last 7 days   Lab Units 01/04/20  0450 01/04/20  0036 01/03/20  1524 01/03/20  0454   SODIUM mmol/L 134* 135*  --  140   POTASSIUM mmol/L 3 2* 3 2*  --  4 8   CHLORIDE mmol/L 101 103  --  108   CO2 mmol/L 28 25  --  22   CO2, I-STAT mmol/L  --   --   --   --    BUN mg/dL 6 7  --  9   CREATININE mg/dL 0 46* 0 52*  --  0 94   GLUCOSE, ISTAT mg/dl  --   --   --   --    CALCIUM mg/dL 8 1* 8 3  --  9 5   AST U/L 338*  --  218*  --    ALT U/L 101*  --  57  --    ALK PHOS U/L 145*  --  146*  --    EGFR ml/min/1 73sq m 109 105  --  67    < > = values in this interval not displayed     , Vitamin B12:   , HgBA1C:   , TSH:   Results from last 7 days   Lab Units 01/03/20 2026   TSH 3RD GENERATON uIU/mL 0 350*   , Coagulation:   Results from last 7 days   Lab Units 01/02/20  0523   INR  1 01      Ref  Range 1/3/2020 05:02 1/3/2020 07:38 1/3/2020 15:02 1/3/2020 20:26 1/4/2020 00:36   CREATINE KINASE-MB FRACTION Latest Ref Range: 0 0 - 5 0 ng/mL 14 4 (H) 35 3 (H) 53 2 (H) 61 8 (H) 51 2 (H)   CREATINE KINASE-MB INDEX Latest Ref Range: 0 0 - 2 5 % <1 0 <1 0 <1 0 <1 0 <1 0   Total CK Latest Ref Range: 26 - 192 U/L 1,656 (H) 4,185 (H) 9,389 (H) 11,961 (H) 11,305 (H)       Imaging Studies: I have personally reviewed pertinent films in PACS    EEG, Echo, Pathology, and Other Studies: EEG c/w mod-sev diffuse cerebral dysfunction and encephalopathy   No epileptic features

## 2020-01-04 NOTE — CONSULTS
Consultation - Medical Toxicology  Domenica Hobbs 61 y o  female MRN: [id number]  Unit/Bed#: [facility] 10 Encounter: [id number]     Reason for Consult / Principal Problem: fever/AMS/NMS    Consults  01/03/20     ASSESSMENT:  1  Fever  2  Encepholopathy  3  Rhabdomyolysis  4  Elevated Lactic Acid  5  Leukocytosis  6  Elevated D-Dimer  7  Elevated AST        RECOMMENDATIONS:    In general, NMS is a disease process that does not happen acutely  It is insidious and develops over days to weeks  In addition, her medications are atypical antipsychotics that have lower risk associated with developing NMS  That being said, the patient has an elevated CK, has AMS, and has some rigidity  It is possible that this may be an uncommon presentation of NMS  Recommend continues supportive care, IV fluids, continues benzodiazepines for agitation and rigidity  Bromocriptine can continue to be given, however, please note that because it is a dopamine agonist, there can be precipitation of psychosis  Please do not give more dantrolene  Dantrolene is the treatment for maligant hyperthermia  In the setting of NMS, dantrolene has shown increased morbidity and mortality when used as treatment  Due to the acuity of patient's presentation, I would strongly recommend also pursuing other diagnostic workup for fever and AMS in this patient  While continuing to evaluate this patient, please avoid serotonergic agents at this time  1   Continues IV fluids and supportive care  2  External cooling if temperative does not decrease  3  Benzodiazepines for agitation and rigidity  4  Trend CK and treatment of rhabdo per primary team  5  Avoid neuroleptic agents  6  Hold dantrolene  7  Can continue bromocriptine  8  Consider other causes for AMS/fever due to atypical presentation  9    If patient continues to be rigid, hyperthermic and benzodiazepines & bromocriptine do not help, the patient may need to be intubated and paralyzed  For further questions, please call [facility]  Service or Patient Access Center to reach the medical  on call  Please see additional teaching note below (if available)    Neuroleptic Malignant Syndrome          Medical Toxicology Discussion  [facility]  Neuroleptic malignant syndrome (NMS) is a clinical diagnosis  The diagnosis is based on 1) exposure to dopamine antagonists, and 2) constellation of clinical signs and symptoms, specifically hyperthermia, muscular rigidity, autonomic instability, and altered mental status  Dopamine antagonists cause NMS  Antipsychotic medications are the most commonly used dopamine antagonists  Antipsychotic medications treat psychosis by blocking dopamine receptors in the limbic system of the brain, which is heavily populated by D2 and D3 receptors  However, there are other dopamine receptors in other parts of the brain  Two areas of the basal ganglia, the substantia nigra and nucleus accumbens, also have high concentrations of dopamine receptors  The substantia nigra plays a key role in movement control, while the nucleus accumbens is involved in cognition and emotion  Dopamine antagonism in the basal ganglia results in movement disorders such as dystonia, akathesia, and parkinsonism  The first generation or typical antipsychotics were introduced in the 1950s to treat psychosis because of their strong antagonism of dopamine receptors  However, these agents are less specific for the D2 receptors in the limbic system and also affect dopamine receptors in the basal ganglia as well as histamine receptors, alpha-receptors, and muscarinic receptors  The atypical antipsychotic agents are newer agents developed to specifically target the dopamine receptors in the limbic system  Therefore, these medications have less extrapyramidal side effects  NMS is a rare adverse drug effect of antipsychotic medication exposure  The antipsychotic agents with strongest antidopaminergic effects are more likely to cause NMS  These medications include phenothiazines (Chlopromazine/Thorazine, Prochlorperazine/Compazine), butyrophenones (Haloperidol/Haldol, Droperidol/Inapsine), and other typical agents  Atypical agents, such as olazapine (Zyprexa), have been associated with NMS, but these agents as a class are much less likely to be associated with the syndrome  NMS occurs after exposure to high dose of antipsychotics when they are rapidly escalated or rapidly decreased, or when other dopamine antagonists are added to a patients medications  NMS usually occurs over days to weeks  The constellation of clinical signs and symptoms include hyperthermia from dopamine disequilibrium in the hypothalamus and muscle rigidity, extrapyramidal effects from tremors to rigidity, autonomic instability (hyper- and/or hypotension), and AMS (confusion to coma)  Treatment of NMS is based on good supportive care  First, the exposure must be stopped  (However, in cases where the precipitant may be the rapid removal of an antipsychotic, the medication may be reintroduced)  Hyperthermia and muscle rigidity must be aggressively treated by sedating the patient  Benzodiazapines are a reasonable first approach for mild to moderate symptoms  If the patient is severely symptomatic, intubation, paralysis, and sedation may be necessary  Central dopamine agonists, such as brompcriptine (2 5-5 mg po tid) may be added once the patient has been stabilized via sedation  Dantrolene is also cited as a treatment but is not of any proven benefit  It decreases muscle rigidity by acting at the neuromuscular junction, preventing release of calcium from the sarcoplasmic reticulum in skeletal muscles  It is not an appropriate treatment without maximizing use of benzodiazepines and paralytics   Indications for dantrolene include malignant hyperthermia, a rare syndrome of muscular rigidity associated with halogenated hydrocarbons used during anesthesia  In the setting of NMS, there are only anecdotal reports of improvement after treating with dantrolene  NMS is a central nervous system disorder best treated by medications that affect the central nervous system  The differential diagnosis of toxicologic causes of altered mental status, neuromuscular rigidity, autonomic instability, and hyperthermia includes serotonin syndrome, sympathomimetic intoxication, malignant hyperthermia, and strychnine poisoning  Non-toxicologic causes of this syndrome include tetanus, thyrotoxicosis, and encephalitis  Hx and PE taken over the phone  Information relayed by primary team     HPI: Lavon Lopez is a 61y o  year old female with history of seizure and mental illness, admitted for right hip pain s/p right hip arthoplasty on 1/2  Patient received rocuronium for the procedure  During post operative recovery, patient developed hypotension, tachycardia, hyperthermia, AMS, and rigidity  Patient received ativan, valium, dantrolene, and bromocriptine with some improvement in rigidity  Blood pressure has been labile, and mentation has been waxing and waning  CK is noted to be increasing, now in the 9000s  Toxicology was consulted for NMS      Review of Systems     Historical Information   Past Medical History:   Diagnosis Date    Anxiety     Back pain at L4-L5 level     Schreiber esophagus     Bulimia     Disease of thyroid gland     hypothyroid    GERD (gastroesophageal reflux disease)     Hyperlipidemia     Hypothyroidism     Leukopenia     Rheumatoid arthritis involving right hip (HCC)     Sciatica     Seizure ([address] )     due to medication mix up 8/2018 only one historically    Synovial cyst of lumbar spine     Vertigo     Weight gain      Past Surgical History:   Procedure Laterality Date    BONE MARROW BIOPSY      BREAST SURGERY      enlargement     COLONOSCOPY      HIP ARTHROPLASTY Right 1/2/2020    Procedure: REVISION TOTAL HIP ARTHROPLASTY WITH REPAIR OF PARIPROSTHETIC FRACTURE - POSTERIOR APPROACH;  Surgeon: Eyal Villalba MD;  Location: [facility];  Service: Orthopedics    [place] TOTAL HIP ARTHROPLASTY Right 12/11/2019    Procedure: ARTHROPLASTY HIP TOTAL ANTERIOR;  Surgeon: Eyal Villalba MD;  Location: [facility];  Service: Orthopedics    RHINOPLASTY       Social History   Social History     Substance and Sexual Activity   Alcohol Use Never    Frequency: Never    Binge frequency: Never     Social History     Substance and Sexual Activity   Drug Use No     Social History     Tobacco Use   Smoking Status Never Smoker   Smokeless Tobacco Never Used     Family History   Problem Relation Age of Onset    Uterine cancer Mother     Esophageal cancer Father     Colon cancer Maternal Grandmother      Prior to Admission medications    Medication Sig Start Date End Date Taking?  Authorizing Provider   acetaminophen (TYLENOL) 325 mg tablet Take 2 tablets (650 mg total) by mouth every 6 (six) hours as needed for mild pain or fever 12/19/19   Meseret Flakes, PA-C   ascorbic acid (VITAMIN C) 500 mg tablet Take 1 tablet (500 mg total) by mouth 2 (two) times a day 11/14/19   Meseret Flakes, PA-C   docusate sodium (COLACE) 100 mg capsule Take 1 capsule (100 mg total) by mouth 2 (two) times a day 12/20/19   Meseret Flakes, PA-C   enoxaparin (LOVENOX) 40 mg/0 4 mL Inject 0 4 mL (40 mg total) under the skin daily Start after surgery 11/14/19 12/14/19  Meseret Flakes, PA-C   ferrous sulfate 324 (65 Fe) mg Take 1 tablet (324 mg total) by mouth 2 (two) times a day before meals 11/14/19   Meseret Flakes, PA-C   ferrous sulfate 325 (65 Fe) mg tablet TAKE 1 TABLET (325 MG TOTAL) BY MOUTH 2 (TWO) TIMES A DAY FOR 90 DAYS 9/25/18 7/5/19  Blessing Guzman DO   folic acid (FOLVITE) 1 mg tablet Take 1 tablet (1 mg total) by mouth daily 11/14/19   Meseret Flakes, PA-C   gabapentin (NEURONTIN) 100 mg capsule Take 1 capsule (100 mg total) by mouth every 8 (eight) hours 12/26/19   Latrell Emerson MD   gabapentin (NEURONTIN) 100 mg capsule Take 1 capsule (100 mg total) by mouth every 8 (eight) hours 12/24/19   Hallie Alvarez PA-C   levothyroxine 25 mcg tablet Take 25 mcg by mouth daily      Historical Provider, MD   LORazepam (ATIVAN) 2 mg tablet TAKE 1 TABLET (2 MG TOTAL) BY MOUTH EVERY 8 (EIGHT) HOURS AS NEEDED FOR ANXIETY 11/13/19   Latrell Emerson MD   methocarbamol (ROBAXIN) 750 mg tablet Take 1 tablet (750 mg total) by mouth every 6 (six) hours 12/20/19   Hallie Alvarez PA-C   Multiple Vitamin (MULTIVITAMIN) capsule Take 1 capsule by mouth daily 11/14/19   Hallie Alvarez PA-C   omeprazole (PriLOSEC) 40 MG capsule Take 80 mg by mouth 2 (two) times a day      Historical Provider, MD   PARoxetine (PAXIL) 30 mg tablet Take 2 tablets (60 mg total) by mouth every morning 10/24/19   Latrell Emerson MD   QUEtiapine (SEROquel) 400 MG tablet TAKE 1 TABLET (400 MG TOTAL) BY MOUTH DAILY AT BEDTIME 11/7/19   Latrell Emerson MD   triamcinolone (KENALOG) 0 1 % cream APPLY TO AFFECTED AREA TWICE A DAY 10/11/19   Latrell Emerson MD   ziprasidone (GEODON) 80 mg capsule Take 1 capsule (80 mg total) by mouth 2 (two) times a day 5/22/19   Ruby Caldwell MD     Current Facility-Administered Medications   Medication Dose Route Frequency    acetaminophen (TYLENOL) tablet 975 mg  975 mg Oral Q8H [address]    aluminum-magnesium hydroxide-simethicone (MYLANTA) 200-200-20 mg/5 mL oral suspension 30 mL  30 mL Oral Q4H PRN    bromocriptine (PARLODEL) tablet 2 5 mg  2 5 mg Oral Q12H    calcium carbonate (OYSTER SHELL,OSCAL) 500 mg tablet 1 tablet  1 tablet Oral BID With Meals    calcium carbonate (TUMS) chewable tablet 500 mg  500 mg Oral Daily PRN    cholecalciferol (VITAMIN D3) tablet 2,000 Units  2,000 Units Oral Daily    docusate sodium (COLACE) capsule 100 mg  100 mg Oral BID    enoxaparin (LOVENOX) subcutaneous injection 40 mg  40 mg Subcutaneous Q84Q [address]    folic acid (FOLVITE) tablet 1 mg  1 mg Oral Daily    gabapentin (NEURONTIN) capsule 100 mg  100 mg Oral TID    HYDROmorphone (DILAUDID) injection 0 2 mg  0 2 mg Intravenous Q4H PRN    [START ON 1/5/2020] iron sucrose (VENOFER) 300 mg in sodium chloride 0 9 % 250 mL IVPB  300 mg Intravenous Every Other Day    lactated ringers infusion  250 mL/hr Intravenous Continuous    levothyroxine tablet 25 mcg  25 mcg Oral Early Morning    lidocaine (LIDODERM) 5 % patch 2 patch  2 patch Topical Daily    LORazepam (ATIVAN) tablet 2 mg  2 mg Oral Q8H PRN    methocarbamol (ROBAXIN) tablet 750 mg  750 mg Oral Q6H [address]    multivitamin-minerals (CENTRUM) tablet 1 tablet  1 tablet Oral Daily    oxyCODONE (ROXICODONE) IR tablet 5 mg  5 mg Oral Q4H PRN    oxyCODONE (ROXICODONE) oral solution 10 mg  10 mg Oral Q4H PRN    pantoprazole (PROTONIX) EC tablet 40 mg  40 mg Oral BID AC     Allergies   Allergen Reactions    Risperdal [Risperidone] Shortness Of Breath     Rapid heart beat, SOB    Zyprexa [Olanzapine] Shortness Of Breath     Rapid heartbeat    Latex Rash     Band aids, adhesives wears normal underwear, can eat bananas  USE PAPER TAPE     Objective     Intake/Output Summary (Last 24 hours) at 1/3/2020 2032  Last data filed at 1/3/2020 1800  Gross per 24 hour   Intake 5700 ml   Output 1950 ml   Net 3750 ml     Invasive Devices:   Peripheral IV 01/02/20 Left Antecubital (Active)   Site Assessment Clean;Dry; Intact 1/3/2020  7:30 PM   Dressing Type Transparent 1/3/2020  7:30 PM   Line Status Flushed;Saline locked 1/3/2020  7:30 PM   Dressing Status Clean;Dry; Intact 1/3/2020  7:30 PM   Dressing Intervention Dressing changed 1/3/2020  8:00 AM   Dressing Change Due 01/06/20 1/3/2020  7:30 PM   Reason Not Rotated Not due 1/3/2020  7:30 PM       Peripheral IV 01/03/20 Right Forearm (Active)   Site Assessment Clean;Dry; Intact 1/3/2020  7:30 PM   Dressing Type Transparent 1/3/2020  7:30 PM   Line Status Infusing 1/3/2020  7:30 PM   Dressing Status Clean;Dry; Intact 1/3/2020  7:30 PM   Dressing Change Due 01/06/19 1/3/2020  7:30 PM   Reason Not Rotated Not due 1/3/2020  7:30 PM       Urethral Catheter Non-latex 16 Fr  (Active)   Reasons to continue Urinary Catheter  Post-operative urological requirements 1/3/2020  4:11 PM   Goal for Removal Other (Comment) 1/3/2020  4:11 PM   Site Assessment Clean;Skin intact 1/3/2020  4:00 PM   Collection Container Standard drainage bag 1/3/2020  4:00 PM   Securement Method Securing device (Describe) 1/3/2020  4:00 PM   Output (mL) 225 mL 1/3/2020  6:00 PM     Vitals   Vitals:    01/03/20 1600 01/03/20 1800 01/03/20 1900 01/03/20 2000   BP: 155/73 147/78 145/72 146/78   TempSrc: Probe Probe  Probe   Pulse: (!) 106 (!) 114 (!) 112 (!) 116   Resp: 21 22 20   Patient Position - Orthostatic VS: Lying Lying  Lying   Temp: 99 5 °F (37 5 °C) 99 1 °F (37 3 °C)  98 9 °F (37 2 °C)     Physical Exam  Per primary team    EKG, Pathology, and Other Studies: I have personally reviewed pertinent reports  Lab Results: I have personally reviewed pertinent reports  Labs:  Results from last 7 days   Lab Units 01/03/20  0435  01/02/20  0523   WBC Thousand/uL 10 47*   < > 3 38*   HEMOGLOBIN g/dL 9 3*   < > 11 6   I STAT HEMOGLOBIN   --    < >  --    HEMATOCRIT % 27 9*   < > 36 3   HEMATOCRIT, ISTAT   --    < >  --    PLATELETS Thousands/uL 356   < > 367   NEUTROS PCT %  --   --  61   LYMPHS PCT %  --   --  26   MONOS PCT %  --   --  10    < > = values in this interval not displayed        Results from last 7 days   Lab Units 01/03/20  1524 01/03/20  0454  01/02/20  1756   SODIUM mmol/L  --  140   < >  --    POTASSIUM mmol/L  --  4 8   < >  --    CHLORIDE mmol/L  --  108   < >  --    CO2 mmol/L  --  22   < >  --    CO2, I-STAT mmol/L  --   --   --  27   BUN mg/dL  --  9   < >  --    CREATININE mg/dL  --  0 94   < >  --    CALCIUM mg/dL  --  9 5   < >  --    ALK PHOS U/L 146*  --   --   --    ALT U/L 57  --   --   --    AST U/L 218*  --   --   --    GLUCOSE, ISTAT mg/dl  --   --   --  155*    < > = values in this interval not displayed  Results from last 7 days   Lab Units 01/02/20  0523   INR  1 01   PTT seconds 34     Results from last 7 days   Lab Units 01/03/20  1010 01/03/20  0738 01/03/20  0336   LACTIC ACID mmol/L 1 8 3 7* 3 4*     0   Lab Value Date/Time    TROPONINI <0 02 01/03/2020 0328    TROPONINI <0 02 05/20/2019 2314    TROPONINI <0 02 10/05/2018 1906    TROPONINI <0 02 08/13/2018 0703             Invalid input(s): EXTPREGUR  Imaging Studies: I have personally reviewed pertinent reports  Counseling / [address] phone  time spent today 45 minutes  Greater than 50% of total time was spent with the patient and / or family counseling and / or coordination of care

## 2020-01-04 NOTE — PROGRESS NOTES
Daily Progress Note - Critical Care   Allen August 61 y o  female MRN: [id number]  Unit/Bed#: MICU 10 Encounter: [id number]        ----------------------------------------------------------------------------------------  HPI/24hr events: 61 y o  Female with PMGH schizoaffective disorder on Geodon, Paxil, Seroquel with recent R total hip arthroplasty performed on 12/11/19 with discharge to inpatient rehab on 12/20/19 to inpatient rehab however she left AMA and had a mechanical fall at home and was found to have a new periprosthetic right femur fracture  She was evaluated by her orthopedics in office on 12/24/19 and there was no plan for surgical repair  She presented to the hospital on 12/27/19 for increased pain and was admitted for rehab placement  She was evaluated by orthopedics who performed a R hip revision on 1/2/19  Overnight after her operation she appeared more delirious with tachycardia in 120s and a septic work-up was initiated with IV fluids and antibiotics  Around 4:20 am the patient became hypertensive and tachycardic with Hrs in the 170s and SBP in the 190s with rigidity and tremors  Her rectal temp was found to be 103  There was concern for neuroleptic malignant syndrome  She was given a total of 2mg Ativa, 10mg valium x 3, Dantrolene 100mg and started on Bromociptine 2 5mg BID with improvement of symptoms  Yesterday her rigidity improved and her mental status is slowing improving  She was seen by neurology who performed a CTH that was negative and obtained a spot EEG that was limited but showed moderate to severe cerebral dysfunction  She was evaluated by toxicology who recommends continuing current treatment with bromocriptine and prn benzos  She received 10mg valium x 2 overnight        ---------------------------------------------------------------------------------------  SUBJECTIVE  This morning Tejas Santos remains confused  No new concerns per nursing       Review of Systems  Review of systems was unable to be performed secondary to confusion  ---------------------------------------------------------------------------------------  Assessment and Plan:    Neuro:    Diagnosis: Neuroleptic Malignant Syndrome  o Seroquel and Geodon held  o Continue prn benzos- received Valium 10mg x2 overnight, will repeat Valium 10mg x1 this AM  o Completed dantrolene  o On bromocriptine 2 5mg BID  o CK peaked at 33342 overnight, repeat 58797, will repeat one to confirm down trend  o Toxicology following with recommendations as above  o Neurology following- obtained CTH negative, spot EEG limited but with moderate to severe cerebral dysfunciton   Diagnosis: Schizoaffective disorder  o Prn Ativan for agitaiton   Diagnosis: Pain/agitation/delirium  o Plan:  On tyelnol 975mg Q8, prn  Oxy 5/10, dilaudid 0 2 for breakthrough pain, prn Ativan for anxiety  o Continue frequent reorientation, maintain sleep wake cycle      CV:    Diagnosis: Tachycardia  o Likely 2/2 to NMS  o Echo yesterday with EF 70%  o Continue to monitor      Pulm:   Diagnosis: No acute issues  o Plan: O2 sats well on room air    GI:    Diagnosis: No active issues   o Plan: Regular diet, bowel regimen with colace      :    Diagnosis: No active issues  o Plan: BUN 6, Cr 0 46, UOP 1 3cc/kg/hr      F/E/N:    Fluids: bicarb 200mL/hr   Electrolytes: K 3 2, given 20mEq x 4 doses, recheck K at 1800, replete for K>4, Phos>3, K>2   Nutrition: regular diet      Heme/Onc:    Diagnosis: Fe deficiency anemia  o Pending morning CBC  o Continue venofer        Endo:   · Hypothyroidism  · TSH 0 350, Free T4 0 88  · Continue levothyroxine  · Vitamin D deficiency  · Continue Vit D 3, calcium carbonate    ID:    Diagnosis: No active issues  o No leukocytosis, afebrile  o Lactic cleared overnight, CXR neg, UA negative, blood cx no growth at 24 hrs    MSK/Skin:    Diagnosis: Rhabdomyolysis  o Plan: CK 69058 ([id number]), will obtain repeat CK to confirm down trend           Disposition: Continue Critical Care   Code Status: Level 1 - Full Code  ---------------------------------------------------------------------------------------  ICU CORE MEASURES    Prophylaxis   VTE Pharmacologic Prophylaxis: Enoxaparin (Lovenox)  VTE Mechanical Prophylaxis: sequential compression device  Stress Ulcer Prophylaxis: Pantoprazole PO    ABCDE Protocol (if indicated)  Plan to perform spontaneous awakening trial today? Not applicable  Plan to perform spontaneous breathing trial today? Not applicable  Obvious barriers to extubation? Not applicable  CAM-ICU: Negative    Invasive Devices Review  Invasive Devices     Peripheral Intravenous Line            Peripheral IV 01/02/20 Left Antecubital 1 day    Peripheral IV 01/03/20 Right Forearm 1 day          Drain            Urethral Catheter Non-latex 16 Fr  1 day              Can any invasive devices be discontinued today? No (provide explanation): necessary for treatment  ---------------------------------------------------------------------------------------  OBJECTIVE    Physical Exam   Constitutional: She appears well-developed and well-nourished  No distress  HENT:   Head: Normocephalic and atraumatic  Eyes: Pupils are equal, round, and reactive to light  EOM are normal    Neck: Normal range of motion  Neck supple  Cardiovascular: Regular rhythm, normal heart sounds and intact distal pulses  tachycardic   Pulmonary/Chest: Effort normal and breath sounds normal  No stridor  No respiratory distress  She has no wheezes  Abdominal: Soft  Bowel sounds are normal  She exhibits no distension and no mass  There is no guarding  Genitourinary:   Genitourinary Comments: Bernardo in place   Musculoskeletal:   Right hip with surgical dressing clean dry and implace  Normal ROM in b/l UE     Neurological: She is alert     Oriented to self  GCS 14 for confusion    Tremors in b/l upper extremities  Strength intact   Skin: Capillary refill takes less than 2 seconds  She is diaphoretic  Left arm IV site with infiltration       Vitals   Vitals:    20 0300 20 0550 20 0600 20 0700   BP: (!) 150/105  132/70 116/84   BP Location:       Pulse: (!) 108  (!) 120 (!) 122   Resp:       Temp:  98 9 °F (37 2 °C) 99 3 °F (37 4 °C) 99 7 °F (37 6 °C)   TempSrc:  Probe     SpO2: 98%  91% 95%   Weight:   74 6 kg (164 lb 7 4 oz)    Height:         Temp (24hrs), Av 5 °F (37 5 °C), Min:98 9 °F (37 2 °C), Max:100 7 °F (38 2 °C)  Current: Temperature: 99 7 °F (37 6 °C)    Respiratory:  SpO2: SpO2: 95 %  O2 Flow Rate (L/min): 4 L/min    Invasive/non-invasive ventilation settings   Respiratory    Lab Data (Last 4 hours)    None         O2/Vent Data (Last 4 hours)    None                Height and Weights   Height: 5' 3" (160 cm)  IBW: 52 4 kg  Body mass index is 29 13 kg/m²  Weight (last 2 days)     Date/Time   Weight    20 0600   74 6 (164 46)              Intake and Output  I/O        07 -  0700  07 -  0700  07 -  0700    P  O        I V  (mL/kg) 3300 (46 3) 5974 2 (80 1)     Blood 700      IV Piggyback 750 1200     Total Intake(mL/kg) 4750 (66 7) 7174 2 (96 2)     Urine (mL/kg/hr) 3335 (2) 2375 (1 3)     Stool       Blood 900      Total Output 4235 2375     Net +515 +4799 2                  Nutrition       Diet Orders   (From admission, onward)             Start     Ordered    20 1609  Diet Regular; Regular House  Diet effective midnight     Question Answer Comment   Diet Type Regular    Regular Regular House    RD to adjust diet per protocol?  No        20 1608                Laboratory and Diagnostics:  Results from last 7 days   Lab Units 20  0435 20  1920 20  1756 20  1700 20  1604 20  1553 20  0523 20  0542   WBC Thousand/uL 10 47* 11 99*  --   --   --   --  3 38* 3 18*   HEMOGLOBIN g/dL 9 3* 11 6  --   --   --   --  11 6 11 8   I STAT HEMOGLOBIN g/dl  --   --  10 5* 10 9* 8 8* 5 8*  --   --    HEMATOCRIT % 27 9* 35 5  --   --   --   --  36 3 36 9   HEMATOCRIT, ISTAT %  --   --  31* 32* 26* 17*  --   --    PLATELETS Thousands/uL 356 429*  --   --   --   --  367 362   NEUTROS PCT %  --   --   --   --   --   --  61 66   MONOS PCT %  --   --   --   --   --   --  10 11     Results from last 7 days   Lab Units 01/04/20  0450 01/04/20  0036 01/03/20  1524 01/03/20  0454 01/02/20  2230 01/02/20  1756 01/02/20  1700 01/02/20  1604  01/02/20  0523 01/01/20  0542   SODIUM mmol/L 134* 135*  --  140 136  --   --   --   --  134* 132*   POTASSIUM mmol/L 3 2* 3 2*  --  4 8 4 3  --   --   --   --  4 2 3 6   CHLORIDE mmol/L 101 103  --  108 105  --   --   --   --  96* 94*   CO2 mmol/L 28 25  --  22 24  --   --   --   --  34* 34*   CO2, I-STAT mmol/L  --   --   --   --   --  27 28 28   < >  --   --    ANION GAP mmol/L 5 7  --  10 7  --   --   --   --  4 4   BUN mg/dL 6 7  --  9 8  --   --   --   --  9 8   CREATININE mg/dL 0 46* 0 52*  --  0 94 0 71  --   --   --   --  0 60 0 58*   CALCIUM mg/dL 8 1* 8 3  --  9 5 8 8  --   --   --   --  9 9 9 7   GLUCOSE RANDOM mg/dL 124 120  --  144* 159*  --   --   --   --  84 77   ALT U/L 101*  --  57  --   --   --   --   --   --   --   --    AST U/L 338*  --  218*  --   --   --   --   --   --   --   --    ALK PHOS U/L 145*  --  146*  --   --   --   --   --   --   --   --    ALBUMIN g/dL 2 5*  --  2 8*  --   --   --   --   --   --   --   --    TOTAL BILIRUBIN mg/dL 0 44  --  0 72  --   --   --   --   --   --   --   --     < > = values in this interval not displayed       Results from last 7 days   Lab Units 01/04/20  0036   MAGNESIUM mg/dL 1 6   PHOSPHORUS mg/dL 2 6*      Results from last 7 days   Lab Units 01/02/20  0523   INR  1 01   PTT seconds 34      Results from last 7 days   Lab Units 01/03/20  0328   TROPONIN I ng/mL <0 02     Results from last 7 days   Lab Units 01/04/20  0036 01/03/20  1010 01/03/20  0738 01/03/20  6773 LACTIC ACID mmol/L 1 5 1 8 3 7* 3 4*     ABG:  Results from last 7 days   Lab Units 01/04/20  0041   PH ART  7 536*   PCO2 ART mm Hg 28 2*   PO2 ART mm Hg 106 7   HCO3 ART mmol/L 23 4   BASE EXC ART mmol/L 1 1   ABG SOURCE  Artery     VBG:  Results from last 7 days   Lab Units 01/04/20  0041   ABG SOURCE  Artery     Results from last 7 days   Lab Units 01/03/20  0328   PROCALCITONIN ng/ml <0 05       Micro  Results from last 7 days   Lab Units 01/03/20  0336 01/03/20  0335   BLOOD CULTURE  No Growth at 24 hrs  No Growth at 24 hrs  EKG: Sinus tachy  Imaging: CTH negative I have personally reviewed pertinent reports     and I have personally reviewed pertinent films in PACS    Active Medications  Scheduled Meds:  Current Facility-Administered Medications:  acetaminophen 975 mg Oral [facility] Elyssa Gibbs PA-C    aluminum-magnesium hydroxide-simethicone 30 mL Oral Q4H PRN Elyssa Gibbs PA-C    bromocriptine 2 5 mg Oral Q12H Aliyah Jay, DO    calcium carbonate 1 tablet Oral BID With Meals Elyssa Gibbs PA-C    calcium carbonate 500 mg Oral Daily PRN Elyssa Gibbs PA-C    cholecalciferol 2,000 Units Oral Daily Elyssa Gibbs PA-C    diazepam 10 mg Intravenous Once Deepti Bansal PA-C    docusate sodium 100 mg Oral BID Elyssa Gibbs PA-C    enoxaparin 40 mg Subcutaneous Q24H [address] Elyssa Gibbs PA-C    folic acid 1 mg Oral Daily Elyssa Gibbs PA-C    gabapentin 100 mg Oral TID Elyssa Gibbs PA-C    HYDROmorphone 0 2 mg Intravenous Q4H PRN Elyssa Gibbs PA-C    [START ON 1/5/2020] iron sucrose 300 mg Intravenous Every Other Day Angel Burnpuneet,     levothyroxine 25 mcg Oral Early Morning Elyssa Gibbs PA-C    lidocaine 2 patch Topical Daily Elyssa Gibbs PA-C    LORazepam 2 mg Oral Q8H PRN Elyssa Gibbs PA-C    methocarbamol 750 mg Oral Q6H [address] Pinkie Elm, PA-C    multivitamin-minerals 1 tablet Oral Daily Elyssa Gibbs PA-C    oxyCODONE 5 mg Oral Q4H PRN Dorcas Hendrix Navya Cyr PA-C    oxyCODONE 10 mg Oral Q4H PRN Silvia Valiente PA-C    pantoprazole 40 mg Oral BID AC Silvia Valiente PA-C    sodium bicarbonate infusion 200 mL/hr [facility]venous [facility] Last Rate: 200 mL/hr (01/04/20 0110)     Continuous Infusions:    sodium bicarbonate infusion 200 mL/hr Last Rate: 200 mL/hr (01/04/20 0110)     PRN Meds:     aluminum-magnesium hydroxide-simethicone 30 mL Q4H PRN   calcium carbonate 500 mg Daily PRN   HYDROmorphone 0 2 mg Q4H PRN   LORazepam 2 mg Q8H PRN   oxyCODONE 5 mg Q4H PRN   oxyCODONE 10 mg Q4H PRN       Allergies   Allergies   Allergen Reactions    Risperdal [Risperidone] Shortness Of Breath     Rapid heart beat, SOB    Zyprexa [Olanzapine] Shortness Of Breath     Rapid heartbeat    Latex Rash     Band aids, adhesives wears normal underwear, can eat bananas  USE PAPER TAPE       Advance Directive and Living Will:      Power of :    POLST:      Counseling / Coordination of Care  Total Critical Care time spent 35 minutes excluding procedures, teaching and family updates  Tami De Los Santos PA-C      Portions of the record may have been created with voice recognition software  Occasional wrong word or "sound a like" substitutions may have occurred due to the inherent limitations of voice recognition software    Read the chart carefully and recognize, using context, where substitutions have occurred

## 2020-01-04 NOTE — PROGRESS NOTES
Subjective: Patient confused this morning has been being worked up for NMS  Lab Results   Component Value Date/Time    WBC 10 47 (H) 01/03/2020 04:35 AM    HGB 9 3 (L) 01/03/2020 04:35 AM       Vitals:    01/04/20 0550   BP:    Pulse:    Resp:    Temp: 98 9 °F (37 2 °C)   SpO2:      Right lower extremity  Dressing C/D/I  Abduction pillow in place  Toes warm and well perfused with brisk capillary refill    Assessment: 61 y o  female post op day #2 from revision right ELIZABETH and ORIF     Plan:  WBAT RLE  Posterior hip precautions    Trend vitals and labs    Pain control  DVT ppx: Sequential compression device (Venodyne)  and Enoxaparin (Lovenox)  PT/OT  Patient noted to have acute blood loss anemia due to a drop in Hbg of > 2 0g from preop levels, will monitor vital signs and resuscitate with IV fluids as needed  Dispo: pending medical improvement

## 2020-01-04 NOTE — QUICK NOTE
Called to patient's bedside by nursing due to infiltrate of bicarb drip at IV site in left medial forearm  Discussed with pharmacy and dry, cold pack to by applied to area and 150units (1mL) of hyaluronidase to be injected in SQ around the site in 5 separate injections of 0 2mL  Site was cleaned and hyaluronidase was injected as instructed  No complications  Patient tolerated well       Argelia Cuellar PA-C

## 2020-01-04 NOTE — ASSESSMENT & PLAN NOTE
She remains confused, disoriented, encephalopathic  Unable to follow 2 part commands at this time   Less anxiety reported by nursing staff this am  Continue w meds

## 2020-01-05 ENCOUNTER — APPOINTMENT (INPATIENT)
Dept: RADIOLOGY | Facility: HOSPITAL | Age: 60
DRG: 466 | End: 2020-01-05
Payer: COMMERCIAL

## 2020-01-05 LAB
ABO GROUP BLD BPU: NORMAL
ABO GROUP BLD: NORMAL
ALBUMIN SERPL BCP-MCNC: 2 G/DL (ref 3.5–5)
ALP SERPL-CCNC: 117 U/L (ref 46–116)
ALT SERPL W P-5'-P-CCNC: 96 U/L (ref 12–78)
ANION GAP SERPL CALCULATED.3IONS-SCNC: 4 MMOL/L (ref 4–13)
AST SERPL W P-5'-P-CCNC: 196 U/L (ref 5–45)
BASOPHILS # BLD AUTO: 0.01 THOUSANDS/ΜL (ref 0–0.1)
BASOPHILS # BLD AUTO: 0.02 THOUSANDS/ΜL (ref 0–0.1)
BASOPHILS NFR BLD AUTO: 0 % (ref 0–1)
BASOPHILS NFR BLD AUTO: 0 % (ref 0–1)
BILIRUB DIRECT SERPL-MCNC: 0.1 MG/DL (ref 0–0.2)
BILIRUB SERPL-MCNC: 0.28 MG/DL (ref 0.2–1)
BLD GP AB SCN SERPL QL: NEGATIVE
BPU ID: NORMAL
BUN SERPL-MCNC: 4 MG/DL (ref 5–25)
CALCIUM SERPL-MCNC: 8.4 MG/DL (ref 8.3–10.1)
CHLORIDE SERPL-SCNC: 102 MMOL/L (ref 100–108)
CK MB SERPL-MCNC: 14.4 NG/ML (ref 0–5)
CK MB SERPL-MCNC: <1 % (ref 0–2.5)
CK SERPL-CCNC: 4297 U/L (ref 26–192)
CO2 SERPL-SCNC: 29 MMOL/L (ref 21–32)
CREAT SERPL-MCNC: 0.35 MG/DL (ref 0.6–1.3)
CROSSMATCH: NORMAL
EOSINOPHIL # BLD AUTO: 0.03 THOUSAND/ΜL (ref 0–0.61)
EOSINOPHIL # BLD AUTO: 0.04 THOUSAND/ΜL (ref 0–0.61)
EOSINOPHIL NFR BLD AUTO: 1 % (ref 0–6)
EOSINOPHIL NFR BLD AUTO: 1 % (ref 0–6)
ERYTHROCYTE [DISTWIDTH] IN BLOOD BY AUTOMATED COUNT: 17.3 % (ref 11.6–15.1)
ERYTHROCYTE [DISTWIDTH] IN BLOOD BY AUTOMATED COUNT: 17.4 % (ref 11.6–15.1)
GFR SERPL CREATININE-BSD FRML MDRD: 120 ML/MIN/1.73SQ M
GLUCOSE SERPL-MCNC: 101 MG/DL (ref 65–140)
GLUCOSE SERPL-MCNC: 108 MG/DL (ref 65–140)
GLUCOSE SERPL-MCNC: 117 MG/DL (ref 65–140)
GLUCOSE SERPL-MCNC: 96 MG/DL (ref 65–140)
HCT VFR BLD AUTO: 20.6 % (ref 34.8–46.1)
HCT VFR BLD AUTO: 23.5 % (ref 34.8–46.1)
HGB BLD-MCNC: 6.7 G/DL (ref 11.5–15.4)
HGB BLD-MCNC: 7.7 G/DL (ref 11.5–15.4)
IMM GRANULOCYTES # BLD AUTO: 0.04 THOUSAND/UL (ref 0–0.2)
IMM GRANULOCYTES # BLD AUTO: 0.05 THOUSAND/UL (ref 0–0.2)
IMM GRANULOCYTES NFR BLD AUTO: 1 % (ref 0–2)
IMM GRANULOCYTES NFR BLD AUTO: 1 % (ref 0–2)
LYMPHOCYTES # BLD AUTO: 0.63 THOUSANDS/ΜL (ref 0.6–4.47)
LYMPHOCYTES # BLD AUTO: 0.99 THOUSANDS/ΜL (ref 0.6–4.47)
LYMPHOCYTES NFR BLD AUTO: 10 % (ref 14–44)
LYMPHOCYTES NFR BLD AUTO: 13 % (ref 14–44)
MAGNESIUM SERPL-MCNC: 1.8 MG/DL (ref 1.6–2.6)
MCH RBC QN AUTO: 28.8 PG (ref 26.8–34.3)
MCH RBC QN AUTO: 28.8 PG (ref 26.8–34.3)
MCHC RBC AUTO-ENTMCNC: 32.5 G/DL (ref 31.4–37.4)
MCHC RBC AUTO-ENTMCNC: 32.8 G/DL (ref 31.4–37.4)
MCV RBC AUTO: 88 FL (ref 82–98)
MCV RBC AUTO: 88 FL (ref 82–98)
MONOCYTES # BLD AUTO: 0.47 THOUSAND/ΜL (ref 0.17–1.22)
MONOCYTES # BLD AUTO: 0.58 THOUSAND/ΜL (ref 0.17–1.22)
MONOCYTES NFR BLD AUTO: 6 % (ref 4–12)
MONOCYTES NFR BLD AUTO: 9 % (ref 4–12)
NEUTROPHILS # BLD AUTO: 5.05 THOUSANDS/ΜL (ref 1.85–7.62)
NEUTROPHILS # BLD AUTO: 6.34 THOUSANDS/ΜL (ref 1.85–7.62)
NEUTS SEG NFR BLD AUTO: 79 % (ref 43–75)
NEUTS SEG NFR BLD AUTO: 79 % (ref 43–75)
NRBC BLD AUTO-RTO: 0 /100 WBCS
NRBC BLD AUTO-RTO: 0 /100 WBCS
PHOSPHATE SERPL-MCNC: 2.7 MG/DL (ref 2.7–4.5)
PLATELET # BLD AUTO: 228 THOUSANDS/UL (ref 149–390)
PLATELET # BLD AUTO: 236 THOUSANDS/UL (ref 149–390)
PMV BLD AUTO: 8.9 FL (ref 8.9–12.7)
PMV BLD AUTO: 9.3 FL (ref 8.9–12.7)
POTASSIUM SERPL-SCNC: 3.5 MMOL/L (ref 3.5–5.3)
PROT SERPL-MCNC: 4.8 G/DL (ref 6.4–8.2)
RBC # BLD AUTO: 2.33 MILLION/UL (ref 3.81–5.12)
RBC # BLD AUTO: 2.67 MILLION/UL (ref 3.81–5.12)
RETICS # AUTO: ABNORMAL 10*3/UL (ref 14097–95744)
RETICS # CALC: 4.24 % (ref 0.37–1.87)
RH BLD: POSITIVE
SODIUM SERPL-SCNC: 135 MMOL/L (ref 136–145)
SPECIMEN EXPIRATION DATE: NORMAL
UNIT DISPENSE STATUS: NORMAL
UNIT PRODUCT CODE: NORMAL
UNIT RH: NORMAL
WBC # BLD AUTO: 6.35 THOUSAND/UL (ref 4.31–10.16)
WBC # BLD AUTO: 7.9 THOUSAND/UL (ref 4.31–10.16)

## 2020-01-05 PROCEDURE — 82948 REAGENT STRIP/BLOOD GLUCOSE: CPT

## 2020-01-05 PROCEDURE — 85025 COMPLETE CBC W/AUTO DIFF WBC: CPT | Performed by: PHYSICIAN ASSISTANT

## 2020-01-05 PROCEDURE — 86900 BLOOD TYPING SEROLOGIC ABO: CPT | Performed by: INTERNAL MEDICINE

## 2020-01-05 PROCEDURE — 84100 ASSAY OF PHOSPHORUS: CPT | Performed by: PHYSICIAN ASSISTANT

## 2020-01-05 PROCEDURE — 99232 SBSQ HOSP IP/OBS MODERATE 35: CPT | Performed by: PSYCHIATRY & NEUROLOGY

## 2020-01-05 PROCEDURE — 80076 HEPATIC FUNCTION PANEL: CPT | Performed by: PHYSICIAN ASSISTANT

## 2020-01-05 PROCEDURE — 86901 BLOOD TYPING SEROLOGIC RH(D): CPT | Performed by: INTERNAL MEDICINE

## 2020-01-05 PROCEDURE — 82553 CREATINE MB FRACTION: CPT | Performed by: PHYSICIAN ASSISTANT

## 2020-01-05 PROCEDURE — 83735 ASSAY OF MAGNESIUM: CPT | Performed by: PHYSICIAN ASSISTANT

## 2020-01-05 PROCEDURE — 99024 POSTOP FOLLOW-UP VISIT: CPT | Performed by: ORTHOPAEDIC SURGERY

## 2020-01-05 PROCEDURE — 86850 RBC ANTIBODY SCREEN: CPT | Performed by: INTERNAL MEDICINE

## 2020-01-05 PROCEDURE — 99233 SBSQ HOSP IP/OBS HIGH 50: CPT | Performed by: INTERNAL MEDICINE

## 2020-01-05 PROCEDURE — 73502 X-RAY EXAM HIP UNI 2-3 VIEWS: CPT

## 2020-01-05 PROCEDURE — 85045 AUTOMATED RETICULOCYTE COUNT: CPT | Performed by: PHYSICIAN ASSISTANT

## 2020-01-05 PROCEDURE — 97164 PT RE-EVAL EST PLAN CARE: CPT

## 2020-01-05 PROCEDURE — 80048 BASIC METABOLIC PNL TOTAL CA: CPT | Performed by: PHYSICIAN ASSISTANT

## 2020-01-05 PROCEDURE — 82550 ASSAY OF CK (CPK): CPT | Performed by: PHYSICIAN ASSISTANT

## 2020-01-05 PROCEDURE — NC001 PR NO CHARGE: Performed by: EMERGENCY MEDICINE

## 2020-01-05 RX ORDER — DIAZEPAM 5 MG/ML
10 INJECTION, SOLUTION INTRAMUSCULAR; INTRAVENOUS EVERY 8 HOURS
Status: DISCONTINUED | OUTPATIENT
Start: 2020-01-05 | End: 2020-01-06

## 2020-01-05 RX ORDER — DIAZEPAM 5 MG/ML
10 INJECTION, SOLUTION INTRAMUSCULAR; INTRAVENOUS EVERY 8 HOURS
Status: DISCONTINUED | OUTPATIENT
Start: 2020-01-05 | End: 2020-01-05

## 2020-01-05 RX ORDER — POLYETHYLENE GLYCOL 3350 17 G/17G
17 POWDER, FOR SOLUTION ORAL DAILY
Status: DISCONTINUED | OUTPATIENT
Start: 2020-01-05 | End: 2020-01-07

## 2020-01-05 RX ORDER — POTASSIUM CHLORIDE 20 MEQ/1
60 TABLET, EXTENDED RELEASE ORAL ONCE
Status: COMPLETED | OUTPATIENT
Start: 2020-01-05 | End: 2020-01-05

## 2020-01-05 RX ADMIN — DOCUSATE SODIUM 100 MG: 100 CAPSULE, LIQUID FILLED ORAL at 08:31

## 2020-01-05 RX ADMIN — BROMOCRIPTINE MESYLATE 5 MG: 2.5 TABLET ORAL at 10:40

## 2020-01-05 RX ADMIN — DIAZEPAM 10 MG: 10 INJECTION, SOLUTION INTRAMUSCULAR; INTRAVENOUS at 09:17

## 2020-01-05 RX ADMIN — PANTOPRAZOLE SODIUM 40 MG: 20 TABLET, DELAYED RELEASE ORAL at 05:45

## 2020-01-05 RX ADMIN — FOLIC ACID 1 MG: 1 TABLET ORAL at 08:32

## 2020-01-05 RX ADMIN — IRON SUCROSE 300 MG: 20 INJECTION, SOLUTION INTRAVENOUS at 09:00

## 2020-01-05 RX ADMIN — ACETAMINOPHEN 975 MG: 325 TABLET ORAL at 05:45

## 2020-01-05 RX ADMIN — GABAPENTIN 100 MG: 100 CAPSULE ORAL at 21:28

## 2020-01-05 RX ADMIN — GABAPENTIN 100 MG: 100 CAPSULE ORAL at 17:14

## 2020-01-05 RX ADMIN — METHOCARBAMOL 750 MG: 750 TABLET, FILM COATED ORAL at 05:45

## 2020-01-05 RX ADMIN — OXYCODONE HYDROCHLORIDE 5 MG: 5 SOLUTION ORAL at 10:39

## 2020-01-05 RX ADMIN — GABAPENTIN 100 MG: 100 CAPSULE ORAL at 08:31

## 2020-01-05 RX ADMIN — METHOCARBAMOL 750 MG: 750 TABLET, FILM COATED ORAL at 13:57

## 2020-01-05 RX ADMIN — LIDOCAINE 2 PATCH: 50 PATCH CUTANEOUS at 08:32

## 2020-01-05 RX ADMIN — MELATONIN 2000 UNITS: at 08:31

## 2020-01-05 RX ADMIN — DIAZEPAM 10 MG: 10 INJECTION, SOLUTION INTRAMUSCULAR; INTRAVENOUS at 01:37

## 2020-01-05 RX ADMIN — ENOXAPARIN SODIUM 40 MG: 40 INJECTION SUBCUTANEOUS at 13:55

## 2020-01-05 RX ADMIN — ACETAMINOPHEN 975 MG: 325 TABLET ORAL at 13:55

## 2020-01-05 RX ADMIN — PANTOPRAZOLE SODIUM 40 MG: 20 TABLET, DELAYED RELEASE ORAL at 17:13

## 2020-01-05 RX ADMIN — POLYETHYLENE GLYCOL 3350 17 G: 17 POWDER, FOR SOLUTION ORAL at 13:55

## 2020-01-05 RX ADMIN — CALCIUM 1 TABLET: 500 TABLET ORAL at 17:14

## 2020-01-05 RX ADMIN — METHOCARBAMOL 750 MG: 750 TABLET, FILM COATED ORAL at 17:14

## 2020-01-05 RX ADMIN — POTASSIUM CHLORIDE 60 MEQ: 1500 TABLET, EXTENDED RELEASE ORAL at 08:59

## 2020-01-05 RX ADMIN — CALCIUM 1 TABLET: 500 TABLET ORAL at 08:56

## 2020-01-05 RX ADMIN — DIAZEPAM 10 MG: 10 INJECTION, SOLUTION INTRAMUSCULAR; INTRAVENOUS at 17:15

## 2020-01-05 RX ADMIN — DOCUSATE SODIUM 100 MG: 100 CAPSULE, LIQUID FILLED ORAL at 17:13

## 2020-01-05 RX ADMIN — Medication 1 TABLET: at 08:31

## 2020-01-05 RX ADMIN — ACETAMINOPHEN 975 MG: 325 TABLET ORAL at 21:28

## 2020-01-05 RX ADMIN — SODIUM CHLORIDE, SODIUM LACTATE, POTASSIUM CHLORIDE, AND CALCIUM CHLORIDE 50 ML/HR: .6; .31; .03; .02 INJECTION, SOLUTION INTRAVENOUS at 05:52

## 2020-01-05 NOTE — PLAN OF CARE
Problem: PHYSICAL THERAPY ADULT  Goal: Performs mobility at highest level of function for planned discharge setting  See evaluation for individualized goals  1/5/2020 1101 by Altaf Montejo, PT  Note:   Prognosis: Fair  Problem List: Decreased strength, Decreased endurance, Impaired balance, Decreased mobility, Pain, Orthopedic restrictions  Assessment: Pt presents to therapy today for a re-eval s/p surgery on 1/2/2019 for ELIZABETH on R revision  DX: encephalopathy acute, NMS, ABIMAEL, non traumatic rhabdomyolysis  Pt's impairments include: reduced mobility, limited endurance, high risk of falling, reduced B LE strength, confusion, pain in R hip  These impairments limit the patient by requiring assistance for mobility, limiting her ability to perform more mobility such as walking further  Pt would benefit from continued skilled therapy while in the hospital to improve overall mobility and working towards a safe d/c  Recommend rehab  At end of session patient was left seated with call bell within reach  Pt educated about her posterior hip precautions  Pt unable to recall without assistance  Pt's nurse and aide were present throughout session  Barriers to Discharge: Decreased caregiver support, Inaccessible home environment     Recommendation: Post acute IP rehab     PT - OK to Discharge: Yes    See flowsheet documentation for full assessment

## 2020-01-05 NOTE — PROGRESS NOTES
Subjective: No acute overnight events  Pt more alert  Lab Results   Component Value Date/Time    WBC 10 24 (H) 01/04/2020 08:46 AM    HGB 7 6 (L) 01/04/2020 02:24 PM       Vitals:    01/05/20 0600   BP: 112/64   Pulse: 88   Resp: 18   Temp: 98 2 °F (36 8 °C)   SpO2: 94%     Right lower extremity  Dressing C/D/I  Abduction pillow in place  Motor intact to EHL/FHL/TA/GS  Sensation intact to light touch to dp/sp/tib/orly/saph distributions  Toes warm and well perfused with brisk capillary refill      Assessment: 61 y o  female post op day #3 from revision right ELIZABETH and ORIF     Plan:  WBAT RLE  Posterior hip precautions    Pain control  DVT ppx: Sequential compression device (Venodyne)  and Enoxaparin (Lovenox)  PT/OT  Patient noted to have acute blood loss anemia due to a drop in Hbg of > 2 0g from preop levels, will monitor vital signs and resuscitate with IV fluids as needed  Dispo: pending medical improvement

## 2020-01-05 NOTE — PHYSICAL THERAPY NOTE
Physical Therapy Re-Evaluation Notee    Patient Name: Snehal COLEMAN Date: 1/5/2020     Problem List  Principal Problem:    Closed right hip fracture ([address] )  Active Problems:    Chronic hyponatremia    Localized osteoporosis with current pathological fracture with routine healing    Acquired hypothyroidism    Tachycardia    Non-traumatic rhabdomyolysis    Schizoaffective disorder, depressive type (HCC)    ABIMAEL (generalized anxiety disorder)    Chronic bilateral low back pain without sciatica    Iron deficiency anemia secondary to inadequate dietary iron intake    SOB (shortness of breath)    NMS (neuroleptic malignant syndrome)    Lactic acid increased    Drug-induced fever    Encephalopathy acute    Leukocytosis    D-dimer, elevated    Hypokalemia       Past Medical History  Past Medical History:   Diagnosis Date    Anxiety     Back pain at L4-L5 level     Schreiber esophagus     Bulimia     Disease of thyroid gland     hypothyroid    GERD (gastroesophageal reflux disease)     Hyperlipidemia     Hypothyroidism     Leukopenia     Rheumatoid arthritis involving right hip (HCC)     Sciatica     Seizure ([facility] [address] )     due to medication mix up 8/2018 only one historically    Synovial cyst of lumbar spine     Vertigo     Weight gain         Past Surgical History  Past Surgical History:   Procedure Laterality Date    BONE MARROW BIOPSY      BREAST SURGERY      enlargement     COLONOSCOPY      HIP ARTHROPLASTY Right 1/2/2020    Procedure: REVISION TOTAL HIP ARTHROPLASTY WITH REPAIR OF PARIPROSTHETIC FRACTURE - POSTERIOR APPROACH;  Surgeon: Sonia Torres MD;  Location: [facility];  Service: Orthopedics    OK TOTAL HIP ARTHROPLASTY Right 12/11/2019    Procedure: ARTHROPLASTY HIP TOTAL ANTERIOR;  Surgeon: Sonia Torres MD;  Location: [facility];  Service: Orthopedics    RHINOPLASTY             01/05/20 1036   Note Type   Note type Re-eval Pain Assessment   Pain Assessment 0-10   Pain Score Worst Possible Pain   Pain Type Acute pain   Pain Location Hip   Pain Orientation Right   Home Living   Additional Comments see IE f or additional information   Prior Function   Comments see IE for additional information   Restrictions/Precautions   RLE Weight Bearing Per Order WBAT   General   Family/Caregiver Present No   Cognition   Overall Cognitive Status WFL   Arousal/Participation Alert   Attention Within functional limits   Orientation Level Oriented X4   Memory Within functional limits   Following Commands Follows all commands and directions without difficulty   RLE Assessment   RLE Assessment X   Strength RLE   R Knee Extension 3-/5   R Ankle Dorsiflexion 3/5   LLE Assessment   LLE Assessment WNL   Coordination   Movements are Fluid and Coordinated 1   Sensation WFL   Light Touch   RLE Light Touch Grossly intact   LLE Light Touch Grossly intact   Bed Mobility   Supine to Sit 3  Moderate assistance   Additional items Assist x 2   Sit to Supine 3  Moderate assistance   Additional items Assist x 2   Transfers   Sit to Stand 3  Moderate assistance   Additional items Assist x 2   Stand to Sit 3  Moderate assistance   Additional items Assist x 2   Ambulation/Elevation   Gait pattern Excessively slow; Step to;Shuffling; Antalgic;Narrow MIGUEL   Gait Assistance 4  Minimal assist   Additional items Assist x 2   Assistive Device Rolling walker   Distance 2ft to chair   Balance   Static Sitting Fair -   Dynamic Sitting Fair -   Static Standing Poor +   Dynamic Standing Poor +   Ambulatory Poor   Endurance Deficit   Endurance Deficit Yes   Activity Tolerance   Activity Tolerance Patient limited by fatigue;Patient limited by pain   Medical Staff Made Aware nurse and aide present   Nurse Made Aware nurse approved therapy session   Assessment   Prognosis Fair   Problem List Decreased strength;Decreased endurance; Impaired balance;Decreased mobility;Pain;Orthopedic restrictions   Assessment Pt presents to therapy today for a re-eval s/p surgery on 1/2/2019 for ELIZABETH on R revision  DX: encephalopathy acute, NMS, ABIMAEL, non traumatic rhabdomyolysis  Pt's impairments include: reduced mobility, limited endurance, high risk of falling, reduced B LE strength, confusion, pain in R hip  These impairments limit the patient by requiring assistance for mobility, limiting her ability to perform more mobility such as walking further  Pt would benefit from continued skilled therapy while in the hospital to improve overall mobility and working towards a safe d/c  Recommend rehab  At end of session patient was left seated with call bell within reach  Pt educated about her posterior hip precautions  Pt unable to recall without assistance  Pt's nurse and aide were present throughout session  Barriers to Discharge Decreased caregiver support; Inaccessible home environment   Goals   STG Expiration Date 01/19/20   Short Term Goal #1 STG 1: Pt will perform transfers at a S level to return to baseline of function  STG 2: Pt will ambulate 150ft with RW at a MI level to reduce the level of assistance needed upon d/c home  STG 3: Pt will perform bed mobility at a S level to reduce the level of assistance needed upon d /c home  STG 4: Pt will I recall hip precautiosn to ensure safety with mobiliy  PT Treatment Day 0   Plan   Treatment/Interventions Functional transfer training;LE strengthening/ROM; Therapeutic exercise; Endurance training;Bed mobility;Gait training;Equipment eval/education   PT Frequency Other (Comment)  (3-5xwk)   Recommendation   Recommendation Post acute IP rehab   Equipment Recommended Walker   PT - OK to Discharge Yes   Additional Comments if to rehab when medically cleared   Modified Allardt Scale   Modified Allardt Scale 4   Barthel Index   Feeding 10   Bathing 0   Grooming Score 0   Dressing Score 5   Bladder Score 5   Bowels Score 5   Toilet Use Score 5   Transfers (Bed/Chair) Score 5   Mobility (Level Surface) Score 0   Stairs Score 0   Barthel Index Score 35   Amanda Marroquin, Pt, DPT

## 2020-01-05 NOTE — ASSESSMENT & PLAN NOTE
She is markedly improved today  Her speech and language are fairly fluent, she is able to participate and track conversation  Her screening cognitive exam was much stronger today than yesterday  I suspect that her encephalopathy has resolved and she is likely very close to baseline

## 2020-01-05 NOTE — PROGRESS NOTES
INTERPROFESSIONAL (PHONE) [facility]  Lincoln August 61 y o  female MRN: [id number]  [facility] En[facility]: [id number]      Reason for current consult: AMS/hyperthermia, NMS vs serotonin syndrome    ASSESSMENT:  1   Fever (resolved)  2   Encepholopathy  3   Rhabdomyolysis  4   Elevated Lactic Acid  5   Leukocytosis  6   Elevated D-Dimer  7   Elevated AST  8  Hypokalemia    RECOMMENDATIONS:  The patient has marked improvement in her physical exam and in her labwork  Vital signs have normalized and mentation has improved  I agree with tapering benzodiazepines at this point as she continues to improve  Bromocriptine can also be stopped as she no longer as rigidity on examination  This patient can be cleared from a toxicologic standpoint when mentation has improved, vital signs have normalized, and patient is ambulatory  Subjective:   Patient without complaints at this time    Objective:     Hx and PE limited by the dynamics of a phone consultation  I have not personally interviewed or evaluated the patient, but only advised based on the information provided to me  Primary provider is responsible for all clinical decisions  Review of systems and physical exam not performed by me  Objective       Intake/Output Summary (Last 24 hours) at 1/5/2020 1035  Last data filed at 1/5/2020 0901  Gross per 24 hour   Intake 3134 15 ml   Output 925 ml   Net 2209 15 ml       Invasive Devices:   Peripheral IV 01/04/20 Right Wrist (Active)   Site Assessment Clean;Dry; Intact 1/5/2020  9:00 AM   Dressing Type Transparent 1/5/2020  9:00 AM   Line Status Infusing 1/5/2020  9:00 AM   Dressing Status Clean;Dry; Intact 1/5/2020  9:00 AM   Dressing Change Due 01/08/20 1/5/2020  6:00 AM   Reason Not Rotated Not due 1/4/2020  8:00 PM       Peripheral IV 01/04/20 Left;Ventral (anterior) Forearm (Active)   Site Assessment Clean;Dry; Intact 1/5/2020  9:00 AM   Dressing Type Transparent 1/5/2020 9:00 AM   Line Status Flushed;Saline locked 1/5/2020  9:00 AM   Dressing Status Clean;Dry; Intact 1/5/2020  9:00 AM   Dressing Change Due 01/08/20 1/5/2020  6:00 AM   Reason Not Rotated Not due 1/4/2020  8:00 PM       Vitals   Vitals:    01/05/20 0600 01/05/20 0800 01/05/20 0900 01/05/20 1000   BP: 112/64 105/65 138/61    TempSrc:  Rectal Rectal Rectal   Pulse: 88 80 90 86   Resp: 18 18 18 16   Patient Position - Orthostatic VS:  Lying     Temp: 98 2 °F (36 8 °C) 97 9 °F (36 6 °C) 98 2 °F (36 8 °C) 98 2 °F (36 8 °C)         EKG, Pathology, and/or Other Studies: I have personally reviewed pertinent reports  Lab Results: I have personally reviewed pertinent reports  Labs:    Results from last 7 days   Lab Units 01/05/20  0916   WBC Thousand/uL 7 90   HEMOGLOBIN g/dL 7 7*   HEMATOCRIT % 23 5*   PLATELETS Thousands/uL 236   NEUTROS PCT % 79*   LYMPHS PCT % 13*   MONOS PCT % 6      Results from last 7 days   Lab Units 01/05/20  0527  01/04/20  0036  01/02/20  1756   SODIUM mmol/L 135*   < > 135*   < >  --    POTASSIUM mmol/L 3 5   < > 3 2*   < >  --    CHLORIDE mmol/L 102   < > 103   < >  --    CO2 mmol/L 29   < > 25   < >  --    CO2, I-STAT mmol/L  --   --   --   --  27   BUN mg/dL 4*   < > 7   < >  --    CREATININE mg/dL 0 35*   < > 0 52*   < >  --    CALCIUM mg/dL 8 4   < > 8 3   < >  --    ALK PHOS U/L 117*   < >  --    < >  --    ALT U/L 96*   < >  --    < >  --    AST U/L 196*   < >  --    < >  --    GLUCOSE, ISTAT mg/dl  --   --   --   --  155*   MAGNESIUM mg/dL 1 8  --  1 6   < >  --    PHOSPHORUS mg/dL 2 7  --  2 6*  --   --     < > = values in this interval not displayed        Results from last 7 days   Lab Units 01/02/20  0523   INR  1 01   PTT seconds 34     Results from last 7 days   Lab Units 01/04/20  0036 01/03/20  1010 01/03/20  0738   LACTIC ACID mmol/L 1 5 1 8 3 7*     0   Lab Value Date/Time    TROPONINI <0 02 01/03/2020 0328    TROPONINI <0 02 05/20/2019 2314    TROPONINI <0 02 10/05/2018 [facility] <0 02 08/13/2018 0703             Invalid input(s): EXTPREGUR      Imaging Studies: I have personally reviewed pertinent reports  Counseling / Coordination of Care  Total time spent today 15 minutes  This was a phone consultation

## 2020-01-05 NOTE — PROGRESS NOTES
Daily Progress Note - Critical Care   Marta Brannon 61 y o  female MRN: [id number]  Unit/Bed#: [facility] 10 Encounter: [id number]        ----------------------------------------------------------------------------------------  HPI/24hr events: Continues with improving mentation  No further episodes of hyperthermia/rigidity/diaphoresis  Straight cath x 1 overnight for urinary retention     ---------------------------------------------------------------------------------------  SUBJECTIVE  Denies any complaints, nervous about moving because of recent hip surgery  Feels like herself again, recalling the current events of the few days she has been encephalopathic for    Review of Systems  Review of systems was reviewed and negative unless stated above in HPI/24-hour events   ---------------------------------------------------------------------------------------  Assessment and Plan:    Neuro:   · Diagnosis: Neuroleptic Malignant Syndrome  ? Decrease Valium to 10 Q8  ? Completed dantrolene  ? Continue bromocriptine 2 5mg BID  ? Toxicology following with recommendations as above  ? Neurology following- obtained CTH negative, spot EEG limited but with moderate to severe cerebral dysfunciton  · Diagnosis: Schizoaffective disorder  ? Prn Ativan for agitaiton  ? Seroquel and Geodon held  · Diagnosis: Pain/agitation/delirium  ? Plan:   ? Continue Tyelnol 975mg Q8, prn  Oxy 5/10, dilaudid 0 2 for breakthrough pain  ? Continue frequent reorientation, maintain sleep wake cycle        CV:   · Diagnosis: Tachycardia- resolved  ? Monitor on telemtry         Pulm:  · Diagnosis: No acute issues     GI:   · Diagnosis: No active issues       :   · Diagnosis: Rhabdo  ?  Plan:   ? Likely secondary to NMS, CK downtrending, stop checking d/c IVF monitor UOP        F/E/N:   · Electrolytes replete for K>4, Phos>3, K>2  · Nutrition: regular diet        Heme/Onc:   · Diagnosis: Fe deficiency anemia with concern of acute blood loss anemia  ? Unclear source, no obvious GI involvement, given recent surgery consider CTA A/P with extension into thigh to evaluate  ? Recheck CBC and check retic count may need 1U PRBC this AM  ? Continue venofer         Endo:   · Hypothyroidism  ? Continue levothyroxine  · Vitamin D deficiency  ? Continue Vit D 3, calcium carbonate     ID:   · No active issues       MSK/Skin:   · Diagnosis: POD3 s/p revision of right ELIZABETH and ORIF  ? Plan:   ? WBAT RLE with posterior hip precautions per ortho  ? Pain control  ? PT/OT    Disposition: Transfer to Med-Surg   Code Status: Level 1 - Full Code  ---------------------------------------------------------------------------------------  ICU CORE MEASURES    Invasive Devices Review  Invasive Devices     Peripheral Intravenous Line            Peripheral IV 20 Left;Ventral (anterior) Forearm 1 day    Peripheral IV 20 Right Wrist 1 day          Drain            External Urinary Catheter less than 1 day              Can any invasive devices be discontinued today? Not applicable  ---------------------------------------------------------------------------------------  OBJECTIVE    Physical Exam    Vitals   Vitals:    20 0300 20 0400 20 0548 20 0600   BP: 95/53 114/64  112/64   BP Location:       Pulse: 74 74  88   Resp: (!) 24 22  18   Temp: 99 °F (37 2 °C) 98 6 °F (37 °C)  98 2 °F (36 8 °C)   TempSrc:       SpO2: 100% 100%  94%   Weight:   74 6 kg (164 lb 7 4 oz)    Height:         Temp (24hrs), Av 3 °F (37 4 °C), Min:98 2 °F (36 8 °C), Max:100 °F (37 8 °C)  Current: Temperature: 98 2 °F (36 8 °C)  Respiratory:  SpO2: SpO2: 94 %  O2 Flow Rate (L/min): 2 L/min    Invasive/non-invasive ventilation settings   Respiratory    Lab Data (Last 4 hours)    None         O2/Vent Data (Last 4 hours)    None                Height and Weights   Height: 5' 3" (160 cm)  IBW: 52 4 kg  Body mass index is 29 13 kg/m²    Weight (last 2 days)     Date/Time   Weight 01/05/20 0548   74 6 (164 46)    01/04/20 0600   74 6 (164 46)              Intake and Output  I/O       01/03 0701 - 01/04 0700 01/04 0701 - 01/05 0700 01/05 0701 - 01/06 0700    P  O   640     I V  (mL/kg) 5974 2 (80 1) 3064 2 (41 1)     Blood       IV Piggyback 1200      Total Intake(mL/kg) 7174 2 (96 2) 3704 2 (49 7)     Urine (mL/kg/hr) 2375 (1 3) 1135 (0 6)     Blood       Total Output 2375 1135     Net +4799 2 +2569 2                UOP: 0 6cc/kg/hr     Nutrition       Diet Orders   (From admission, onward)             Start     Ordered    01/03/20 1609  Diet Regular; Regular House  Diet effective midnight     Question Answer Comment   Diet Type Regular    Regular Regular House    RD to adjust diet per protocol? No        01/03/20 1608                Laboratory and Diagnostics:  Results from last 7 days   Lab Units 01/05/20  0527 01/04/20  1424 01/04/20  0846 01/03/20  0435 01/02/20  1920 01/02/20  1756 01/02/20  1700  01/02/20  0523 01/01/20  0542   WBC Thousand/uL 6 35  --  10 24* 10 47* 11 99*  --   --   --  3 38* 3 18*   HEMOGLOBIN g/dL 6 7* 7 6* 7 6* 9 3* 11 6  --   --   --  11 6 11 8   I STAT HEMOGLOBIN g/dl  --   --   --   --   --  10 5* 10 9*   < >  --   --    HEMATOCRIT % 20 6* 22 3* 22 6* 27 9* 35 5  --   --   --  36 3 36 9   HEMATOCRIT, ISTAT %  --   --   --   --   --  31* 32*   < >  --   --    PLATELETS Thousands/uL 228  --  227 356 429*  --   --   --  367 362   NEUTROS PCT % 79*  --   --   --   --   --   --   --  61 66   MONOS PCT % 9  --   --   --   --   --   --   --  10 11    < > = values in this interval not displayed       Results from last 7 days   Lab Units 01/05/20  0527 01/04/20  2039 01/04/20  1424 01/04/20  0450 01/04/20  0036 01/03/20  1524 01/03/20 0454 01/02/20  2230   SODIUM mmol/L 135* 135* 136 134* 135*  --  140 136   POTASSIUM mmol/L 3 5 3 4* 3 3* 3 2* 3 2*  --  4 8 4 3   CHLORIDE mmol/L 102 100 100 101 103  --  108 105   CO2 mmol/L 29 30 30 28 25  --  22 24   ANION GAP mmol/L 4 5 6 5 7  --  10 7   BUN mg/dL 4* 5 4* 6 7  --  9 8   CREATININE mg/dL 0 35* 0 42* 0 52* 0 46* 0 52*  --  0 94 0 71   CALCIUM mg/dL 8 4 8 2* 8 5 8 1* 8 3  --  9 5 8 8   GLUCOSE RANDOM mg/dL 101 114 102 124 120  --  144* 159*   ALT U/L 96*  --   --  101*  --  57  --   --    AST U/L 196*  --   --  338*  --  218*  --   --    ALK PHOS U/L 117*  --   --  145*  --  146*  --   --    ALBUMIN g/dL 2 0*  --   --  2 5*  --  2 8*  --   --    TOTAL BILIRUBIN mg/dL 0 28  --   --  0 44  --  0 72  --   --      Results from last 7 days   Lab Units 01/05/20  0527 01/04/20  0036   MAGNESIUM mg/dL 1 8 1 6   PHOSPHORUS mg/dL 2 7 2 6*      Results from last 7 days   Lab Units 01/02/20  0523   INR  1 01   PTT seconds 34      Results from last 7 days   Lab Units 01/03/20  0328   TROPONIN I ng/mL <0 02     Results from last 7 days   Lab Units 01/04/20  0036 01/03/20  1010 01/03/20  0738 01/03/20  0336   LACTIC ACID mmol/L 1 5 1 8 3 7* 3 4*     ABG:  Results from last 7 days   Lab Units 01/04/20  0041   PH ART  7 536*   PCO2 ART mm Hg 28 2*   PO2 ART mm Hg 106 7   HCO3 ART mmol/L 23 4   BASE EXC ART mmol/L 1 1   ABG SOURCE  Artery     VBG:  Results from last 7 days   Lab Units 01/04/20  0041   ABG SOURCE  Artery     Results from last 7 days   Lab Units 01/03/20  0328   PROCALCITONIN ng/ml <0 05       Micro  Results from last 7 days   Lab Units 01/03/20  0336 01/03/20  0335   BLOOD CULTURE  No Growth at 48 hrs  No Growth at 48 hrs         EKG: no new  Imaging: no new     Active Medications  Scheduled Meds:  Current Facility-Administered Medications:  acetaminophen 975 mg Oral [facility] Kecia Jhaveri PA-C   aluminum-magnesium hydroxide-simethicone 30 mL Oral Q4H PRN Eura Budd, PA-C   bromocriptine 5 mg Oral Q12H Deepti Bansal PA-C   calcium carbonate 1 tablet Oral BID With Meals Eura Budd, PA-WAYNE   calcium carbonate 500 mg Oral Daily PRN Eura Budd, PA-WAYNE   cholecalciferol 2,000 Units Oral Daily Eura Budd, PA-C diazepam 10 mg Intravenous Q8H Connie Gloria PA-C   docusate sodium 100 mg Oral BID Silvia Valiente PA-C   folic acid 1 mg Oral Daily Silvia Valiente PA-C   gabapentin 100 mg Oral TID Silvia Valiente PA-C   iron sucrose 300 mg Intravenous Every Other Day [facility],    levothyroxine 25 mcg Oral Early Morning Silvia Valiente PA-C   lidocaine 2 patch Topical Daily Silvia Valiente PA-C   LORazepam 2 mg Oral Q8H PRN Silvia Valiente PA-C   methocarbamol 750 mg Oral Q6H [address] Silvia Valiente PA-C   multivitamin-minerals 1 tablet Oral Daily Silvia Valiente PA-C   oxyCODONE 2 5 mg Oral Q4H PRN Baylee Venegas PA-C   oxyCODONE 5 mg Oral Q4H PRN Baylee Venegas PA-C   pantoprazole 40 mg Oral BID AC Silvia Valiente PA-C   potassium chloride 60 mEq Oral Once Baylee Venegas PA-C     Continuous Infusions:     PRN Meds:     aluminum-magnesium hydroxide-simethicone 30 mL Q4H PRN   calcium carbonate 500 mg Daily PRN   LORazepam 2 mg Q8H PRN   oxyCODONE 2 5 mg Q4H PRN   oxyCODONE 5 mg Q4H PRN       Allergies   Allergies   Allergen Reactions    Risperdal [Risperidone] Shortness Of Breath     Rapid heart beat, SOB    Zyprexa [Olanzapine] Shortness Of Breath     Rapid heartbeat    Latex Rash     Band aids, adhesives wears normal underwear, can eat bananas  USE PAPER TAPE       Advance Directive and Living Will:      Power of :    POLST:      Counseling / Coordination of Care  Total Critical Care time spent 0 minutes excluding procedures, teaching and family updates  Baylee Venegas PA-C      Portions of the record may have been created with voice recognition software  Occasional wrong word or "sound a like" substitutions may have occurred due to the inherent limitations of voice recognition software    Read the chart carefully and recognize, using context, where substitutions have occurred

## 2020-01-05 NOTE — PROGRESS NOTES
Neuro Progress Note - Jennifer Gonzales 1960, 61 y o  female   MRN: [id number] Unit/Bed#: [facility] 10 Encounter: [id number]        Encephalopathy acute  Assessment & Plan  She is markedly improved today  Her speech and language are fairly fluent, she is able to participate and track conversation  Her screening cognitive exam was much stronger today than yesterday  I suspect that her encephalopathy has resolved and she is likely very close to baseline  NMS (neuroleptic malignant syndrome)  Assessment & Plan  With the improvement noted in this patient today, her mental status is better, her fevers down, she is not rigid, her CK is down; she will not need a spinal tap  With the exception of her low blood count today, questionable etiology, she likely would be stable for transfer out to the floor  Appreciate toxicology's assistance with this patient  She should be following up with us in the office in approximately 1 month  ABIMAEL (generalized anxiety disorder)  Assessment & Plan  This patient is on 3 psychiatric meds for her anxiety disorder  She reports she does not follow through with a psychiatrist she has her family doctor prescribing her meds  Given her premature decision to leave rehabilitation AMA, was her anxiety disorder playing a role  She may benefit from psychiatric care and services as she continues to heal and rehabilitate  Non-traumatic rhabdomyolysis  Assessment & Plan  Her CK today is down to 4500  Subjective/Objective     Subjective:  Feel so much better today  ROS:  She reports that she feels well and she is looking forward to the [employer] football game today  She denies any complaints of headache  She reports that she slept like a rock last night  She has no visual disturbance  She reports that she is hungry  She has no chest pain or shortness of breath  She denies all with a extended query she is a reliable historian today much more so than yesterday        Current Facility-Administered Medications:  acetaminophen 975 mg Oral Q8H [address]   aluminum-magnesium hydroxide-simethicone 30 mL Oral Q4H PRN   bromocriptine 5 mg Oral Q12H   calcium carbonate 1 tablet Oral BID With Meals   calcium carbonate 500 mg Oral Daily PRN   cholecalciferol 2,000 Units Oral Daily   diazepam 10 mg Intravenous Q8H   docusate sodium 100 mg Oral BID   folic acid 1 mg Oral Daily   gabapentin 100 mg Oral TID   iron sucrose 300 mg Intravenous Every Other Day   levothyroxine 25 mcg Oral Early Morning   lidocaine 2 patch Topical Daily   LORazepam 2 mg Oral Q8H PRN   methocarbamol 750 mg Oral Q6H [address]   multivitamin-minerals 1 tablet Oral Daily   oxyCODONE 2 5 mg Oral Q4H PRN   oxyCODONE 5 mg Oral Q4H PRN   pantoprazole 40 mg Oral BID AC     aluminum-magnesium hydroxide-simethicone    calcium carbonate    LORazepam    oxyCODONE    oxyCODONE    Vitals: Blood pressure 138/61, pulse 90, temperature 98 2 °F (36 8 °C), temperature source Rectal, resp  rate 18, height 5' 3" (1 6 m), weight 74 6 kg (164 lb 7 4 oz), SpO2 97 %  ,Body mass index is 29 13 kg/m²  Physical Exam:     Ruslan Winters seen in:  The critical care bed, there is no family present  General appearance: alert, self feeding and bed watching TV  Neck, Lungs, Heart, & abdomen: WNL  Extremities: atraumatic, no cyanosis or edema    Neurologic:   Mental status: Alert, oriented, she was able to report that it is January of 2020  She knew today was Sundays today's football day  She was able to clearly report on the progress of the [employer] football team although she did not know who they were playing today  Her thought content appropriate, without aphasia or dysarthria  She was able to reverse order 9 of the 12 months of the year and then had subsequent difficulty and could not complete the task even with cuing  She was able to calculate a sum of quarters by individually counting each 1   She did well with some animal listing she was able to generate an animal list of approximately 16 animals within the space of minute without good variety and problem-solving techniques  She was able to follow commands including a 2 part commands easily  She was not distracted as she was yesterday  CN:  Benign symmetrical exam EOM's I, Gaze conjugate  Non lateralizing sensory & motor exam, (PP not tested on face)  Reminder CNVIII-XII normal    Motor: full power age appropriate x up limbs, there is no tremulousness today  She has normal tone appreciated in both the bilateral upper extremities  There is no increased torso or nuchal rigidity  Sensory: grossly intact  X 4 limbs, PP not tested  Cerebellar: no ataxia or past pointing w pronation from a modified Romberg position  Finger taps much improved over yesterday as well  There is no general tremor with pronation or supination  Gait:  She is weight-bearing as tolerated however she is likely significantly deconditioned            Lab Results:   I have personally reviewed pertinent reports  , CBC:   Results from last 7 days   Lab Units 01/05/20  0916 01/05/20  0527 01/04/20  0846   WBC Thousand/uL 7 90 6 35 10 24*   RBC Million/uL 2 67* 2 33* 2 64*   HEMOGLOBIN g/dL 7 7* 6 7* 7 6*   HEMATOCRIT % 23 5* 20 6* 22 6*   MCV fL 88 88 86   PLATELETS Thousands/uL 236 228 227   , BMP/CMP:   Lab Units 01/05/20  0527 01/04/20  2039 01/04/20  1424 01/04/20  0450   SODIUM mmol/L 135* 135* 136 134*   POTASSIUM mmol/L 3 5 3 4* 3 3* 3 2*   CHLORIDE mmol/L 102 100 100 101   CO2 mmol/L 29 30 30 28   CO2, I-STAT mmol/L  --   --   --   --    BUN mg/dL 4* 5 4* 6   CREATININE mg/dL 0 35* 0 42* 0 52* 0 46*   GLUCOSE, ISTAT mg/dl  --   --   --   --    CALCIUM mg/dL 8 4 8 2* 8 5 8 1*   AST U/L 196*  --   --  338*   ALT U/L 96*  --   --  101*   ALK PHOS U/L 117*  --   --  145*   EGFR ml/min/1 73sq m 120 113 105 109   Her CK is down to 4200 today compared with 11,000 yesterday  Imaging Studies: No new neurodiagnostic imaging

## 2020-01-05 NOTE — ASSESSMENT & PLAN NOTE
With the improvement noted in this patient today, her mental status is better, her fevers down, she is not rigid, her CK is down; she will not need a spinal tap  With the exception of her low blood count today, questionable etiology, she likely would be stable for transfer out to the floor  Appreciate toxicology's assistance with this patient  She should be following up with us in the office in approximately 1 month

## 2020-01-05 NOTE — ASSESSMENT & PLAN NOTE
This patient is on 3 psychiatric meds for her anxiety disorder  She reports she does not follow through with a psychiatrist she has her family doctor prescribing her meds  Given her premature decision to leave rehabilitation AMA, was her anxiety disorder playing a role  She may benefit from psychiatric care and services as she continues to heal and rehabilitate

## 2020-01-06 ENCOUNTER — APPOINTMENT (INPATIENT)
Dept: RADIOLOGY | Facility: HOSPITAL | Age: 60
DRG: 466 | End: 2020-01-06
Payer: COMMERCIAL

## 2020-01-06 PROBLEM — R33.9 URINARY RETENTION: Status: ACTIVE | Noted: 2020-01-06

## 2020-01-06 LAB
ERYTHROCYTE [DISTWIDTH] IN BLOOD BY AUTOMATED COUNT: 17.8 % (ref 11.6–15.1)
HCT VFR BLD AUTO: 26.7 % (ref 34.8–46.1)
HGB BLD-MCNC: 8.5 G/DL (ref 11.5–15.4)
MCH RBC QN AUTO: 29.2 PG (ref 26.8–34.3)
MCHC RBC AUTO-ENTMCNC: 31.8 G/DL (ref 31.4–37.4)
MCV RBC AUTO: 92 FL (ref 82–98)
PLATELET # BLD AUTO: 299 THOUSANDS/UL (ref 149–390)
PMV BLD AUTO: 9.5 FL (ref 8.9–12.7)
RBC # BLD AUTO: 2.91 MILLION/UL (ref 3.81–5.12)
WBC # BLD AUTO: 6.96 THOUSAND/UL (ref 4.31–10.16)

## 2020-01-06 PROCEDURE — 97530 THERAPEUTIC ACTIVITIES: CPT

## 2020-01-06 PROCEDURE — 97168 OT RE-EVAL EST PLAN CARE: CPT

## 2020-01-06 PROCEDURE — 73502 X-RAY EXAM HIP UNI 2-3 VIEWS: CPT

## 2020-01-06 PROCEDURE — 99232 SBSQ HOSP IP/OBS MODERATE 35: CPT | Performed by: PHYSICIAN ASSISTANT

## 2020-01-06 PROCEDURE — 99232 SBSQ HOSP IP/OBS MODERATE 35: CPT | Performed by: PSYCHIATRY & NEUROLOGY

## 2020-01-06 PROCEDURE — 0T9B70Z DRAINAGE OF BLADDER WITH DRAINAGE DEVICE, VIA NATURAL OR ARTIFICIAL OPENING: ICD-10-PCS | Performed by: INTERNAL MEDICINE

## 2020-01-06 PROCEDURE — 97110 THERAPEUTIC EXERCISES: CPT

## 2020-01-06 PROCEDURE — 85027 COMPLETE CBC AUTOMATED: CPT | Performed by: PHYSICIAN ASSISTANT

## 2020-01-06 PROCEDURE — 99024 POSTOP FOLLOW-UP VISIT: CPT | Performed by: ORTHOPAEDIC SURGERY

## 2020-01-06 RX ORDER — DIAZEPAM 5 MG/ML
10 INJECTION, SOLUTION INTRAMUSCULAR; INTRAVENOUS EVERY 12 HOURS
Status: DISCONTINUED | OUTPATIENT
Start: 2020-01-06 | End: 2020-01-07

## 2020-01-06 RX ADMIN — DIAZEPAM 10 MG: 10 INJECTION, SOLUTION INTRAMUSCULAR; INTRAVENOUS at 22:05

## 2020-01-06 RX ADMIN — ACETAMINOPHEN 975 MG: 325 TABLET ORAL at 15:47

## 2020-01-06 RX ADMIN — PANTOPRAZOLE SODIUM 40 MG: 20 TABLET, DELAYED RELEASE ORAL at 05:24

## 2020-01-06 RX ADMIN — OXYCODONE HYDROCHLORIDE 5 MG: 5 SOLUTION ORAL at 15:47

## 2020-01-06 RX ADMIN — ACETAMINOPHEN 975 MG: 325 TABLET ORAL at 05:25

## 2020-01-06 RX ADMIN — METHOCARBAMOL 750 MG: 750 TABLET, FILM COATED ORAL at 00:08

## 2020-01-06 RX ADMIN — MELATONIN 2000 UNITS: at 08:36

## 2020-01-06 RX ADMIN — LIDOCAINE 2 PATCH: 50 PATCH CUTANEOUS at 08:36

## 2020-01-06 RX ADMIN — GABAPENTIN 100 MG: 100 CAPSULE ORAL at 15:47

## 2020-01-06 RX ADMIN — METHOCARBAMOL 750 MG: 750 TABLET, FILM COATED ORAL at 13:49

## 2020-01-06 RX ADMIN — LEVOTHYROXINE SODIUM 25 MCG: 25 TABLET ORAL at 05:25

## 2020-01-06 RX ADMIN — METHOCARBAMOL 750 MG: 750 TABLET, FILM COATED ORAL at 23:35

## 2020-01-06 RX ADMIN — ENOXAPARIN SODIUM 40 MG: 40 INJECTION SUBCUTANEOUS at 13:49

## 2020-01-06 RX ADMIN — POLYETHYLENE GLYCOL 3350 17 G: 17 POWDER, FOR SOLUTION ORAL at 08:35

## 2020-01-06 RX ADMIN — METHOCARBAMOL 750 MG: 750 TABLET, FILM COATED ORAL at 05:25

## 2020-01-06 RX ADMIN — CALCIUM 1 TABLET: 500 TABLET ORAL at 15:47

## 2020-01-06 RX ADMIN — CALCIUM 1 TABLET: 500 TABLET ORAL at 08:36

## 2020-01-06 RX ADMIN — METHOCARBAMOL 750 MG: 750 TABLET, FILM COATED ORAL at 18:58

## 2020-01-06 RX ADMIN — ACETAMINOPHEN 975 MG: 325 TABLET ORAL at 22:05

## 2020-01-06 RX ADMIN — DIAZEPAM 10 MG: 10 INJECTION, SOLUTION INTRAMUSCULAR; INTRAVENOUS at 10:40

## 2020-01-06 RX ADMIN — PANTOPRAZOLE SODIUM 40 MG: 20 TABLET, DELAYED RELEASE ORAL at 15:48

## 2020-01-06 RX ADMIN — FOLIC ACID 1 MG: 1 TABLET ORAL at 08:35

## 2020-01-06 RX ADMIN — Medication 1 TABLET: at 08:36

## 2020-01-06 RX ADMIN — GABAPENTIN 100 MG: 100 CAPSULE ORAL at 22:05

## 2020-01-06 RX ADMIN — GABAPENTIN 100 MG: 100 CAPSULE ORAL at 08:36

## 2020-01-06 RX ADMIN — DOCUSATE SODIUM 100 MG: 100 CAPSULE, LIQUID FILLED ORAL at 08:36

## 2020-01-06 NOTE — OCCUPATIONAL THERAPY NOTE
[address] Re-Evaluation     Patient Name: Palak WILSON Date: 1/6/2020  Problem List  Principal Problem:    Closed right hip fracture ([address] )  Active Problems:    Chronic hyponatremia    Localized osteoporosis with current pathological fracture with routine healing    Acquired hypothyroidism    Tachycardia    Non-traumatic rhabdomyolysis    Schizoaffective disorder, depressive type (HCC)    ABIMAEL (generalized anxiety disorder)    Chronic bilateral low back pain without sciatica    Iron deficiency anemia secondary to inadequate dietary iron intake    SOB (shortness of breath)    NMS (neuroleptic malignant syndrome)    Lactic acid increased    Drug-induced fever    Encephalopathy acute    Leukocytosis    D-dimer, elevated    Hypokalemia    Past Medical History  Past Medical History:   Diagnosis Date    Anxiety     Back pain at L4-L5 level     Schreiber esophagus     Bulimia     Disease of thyroid gland     hypothyroid    GERD (gastroesophageal reflux disease)     Hyperlipidemia     Hypothyroidism     Leukopenia     Rheumatoid arthritis involving right hip (HCC)     Sciatica     Seizure ([facility] [address] )     due to medication mix up 8/2018 only one historically    Synovial cyst of lumbar spine     Vertigo     Weight gain      Past Surgical History  Past Surgical History:   Procedure Laterality Date    BONE MARROW BIOPSY      BREAST SURGERY      enlargement     COLONOSCOPY      HIP ARTHROPLASTY Right 1/2/2020    Procedure: REVISION TOTAL HIP ARTHROPLASTY WITH REPAIR OF PARIPROSTHETIC FRACTURE - POSTERIOR APPROACH;  Surgeon: Ana María Bedolla MD;  [facility];  Service: Orthopedics    DC TOTAL HIP ARTHROPLASTY Right 12/11/2019    Procedure: ARTHROPLASTY HIP TOTAL ANTERIOR;  Surgeon: Ana María Bedolla MD;  [facility];  Service: Orthopedics    RHINOPLASTY             01/06/20 1536   Note Type   Note type Re-eval   Restrictions/Precautions   Weight Bearing Precautions Per Order Yes   RLE Weight Bearing Per Order WBAT   Other Precautions WBS; Fall Risk;Pain;Cognitive;THR  (posterior hip precautions )   Pain Assessment   Pain Assessment 0-10   Pain Score Worst Possible Pain   Pain Location Hip   Pain Orientation Right   Hospital Pain Intervention(s) Repositioned; Ambulation/increased activity; Emotional support  (RN aware)   Response to Interventions Tolerated   Home Living   Type of [address] One level   Bathroom Shower/Tub Tub/shower unit   Bathroom Toilet Standard   Bathroom Equipment Grab bars in shower   Home Equipment Walker;Cane   Additional Comments Pt lives in a 1 story home with 1 FAMILIA  Prior Function   Level of [place] Needs assistance with IADLs; Needs assistance with ADLs and functional mobility   Lives With Family  (mother )   Receives Help From Family;Friend(s)   ADL Assistance Needs assistance   IADLs Needs assistance   Falls in the last 6 months 1 to 4   Vocational Retired   Lifestyle   Autonomy Pt reports being I before ELIZABETH, but has required assistance for ADLS and IADLS since then and uses cane and RW for mobility  (+) drives    Reciprocal Relationships Pt has limited support- lives with her mother who she is a caregiver for    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[employer] Enjoys spending time with her cat   Psychosocial   Psychosocial (WDL) WDL   Subjective   Subjective "I don't think I'm doing very good"    ADL   Eating Assistance 7  Independent   Grooming Assistance 5  Supervision/Setup   [address]  Minimal Assistance   LB Bathing Assistance 2  Maximal Assistance    [facility] [address] 4  Minimal Assistance   LB Dressing Assistance 2  Maximal [address]  3  Moderate Assistance   Bed Mobility   Additional Comments Unable to assess, pt seated OOB in chair upon OT arrival  After OT session pt seated in chair with all needs within reach      Transfers   Sit to Stand 3  Moderate assistance Additional items Assist x 1; Increased time required;Verbal cues   Stand to Sit 3  Moderate assistance   Additional items Assist x 1; Increased time required;Verbal cues   Additional Comments Transfers with RW  Pt required VC for hand placement and safety  Functional Mobility   Functional Mobility 3  Moderate assistance   Additional Comments Pt demonstrated short household mobility with one seated rest break  Requires encouragement throughout 2* self limiting behaviors  Additional items Rolling walker   Balance   Static Sitting Fair   Dynamic Sitting Fair -   Static Standing Poor +   Dynamic Standing Poor   Ambulatory Poor   Activity Tolerance   Activity Tolerance Patient limited by fatigue;Patient limited by pain   Nurse Made Aware RN confirmed okay to see pt and was updated after    RUE Assessment   RUE Assessment WFL   LUE Assessment   LUE Assessment WFL   Cognition   Arousal/Participation Alert; Cooperative   Attention Within functional limits   Orientation Level Oriented X4   Memory Decreased recall of precautions   Following Commands Follows one step commands without difficulty   Comments Pt is pleasant and cooperative to work with therapy  Pt was able to recall 1/3 posterior hip precautions and was educated on others  Assessment   Limitation Decreased ADL status; Decreased cognition;Decreased self-care trans;Decreased high-level ADLs   Prognosis Fair   Assessment Pt is a 61 y o  YO  female admitted to [facility] on 12/27/2019 w/ closed R hip fracture seen for OT re-eval 2* "[facility]" on 1/2/2020  Pt underwent anterior R ELIZABETH on 12/11/2019 and was discharged to rehab on 12/20/2019  Pt signed herself out [facility] on 12/21/2019 and fell later that day at home causing R hip fracture  Pt is WBAT on RLE with posterior hip precautions  Pt resides in a 1 story home 1 FAMILIA  Pt lives with her mother who she is a caregiver to   Pt is currently mod A for functional mobility and functional transfers, min A for UB ADLS and max A for LB ADLS  Pt is limited at this time 2*: pain, endurance, activity tolerance, functional mobility, forward functional reach, unsupportive home environment and decreased I w/ ADLS/IADLS  The following Occupational Performance Areas to address include: bathing/shower, toilet hygiene, dressing, functional mobility and clothing management  From OT standpoint, anticipate d/c STR  Pt to continue to benefit from acute immediate OT services to address the following goals 3-5x/week to  w/in 10-14 days: Patsi Reil Goals   Patient Goals To walk more    LTG Time Frame 10-   Long Term Goal #1 See goals below    Plan   Treatment Interventions ADL retraining;Functional transfer training; Endurance training;Patient/family training;Equipment evaluation/education; Compensatory technique education;Continued evaluation; Energy conservation; Activityengagement   Goal Expiration Date 20   OT Frequency 3-5x/wk   Recommendation   OT Discharge Recommendation Short Term Rehab   OT - OK to Discharge Yes  (When medically appropriate to rehab)   Barthel Index   Feeding 10   Bathing 0   Grooming Score 5   Dressing Score 5   Bladder Score 10   Bowels Score 10   Toilet Use Score 5   Transfers (Bed/Chair) Score 5   Mobility (Level Surface) Score 0   Stairs Score 0   Barthel Index Score 50   Modified Oakman Scale   Modified Oakman Scale 4     GOALS    1) Pt will increase activity tolerance to G for 30 min txment sessions    2) Pt will complete UB/LB dressing/self care w/ mod I using adaptive device and DME as needed    3) Pt will complete bathing w/ Mod I w/ use of AE and DME as needed    4) Pt will complete toileting w/ mod I w/ G hygiene/thoroughness using DME as needed    5) Pt will improve functional transfers to Mod I on/off all surfaces using DME as needed w/ G balance/safety     6) Pt will improve functional mobility during ADL/IADL/leisure tasks to Mod I using DME as needed w/ G balance/safety     7) Pt will participate in simulated IADL management task with DME as needed to increase independence to  w/ G safety and endurance    8) Pt will demonstrate G carryover of pt/caregiver education and training as appropriate      9) Pt will demonstrate 100% carryover of energy conservation techniques t/o functional I/ADL/leisure tasks w/o cues s/p skilled education    10) Pt will engage in ongoing cognitive assessment w/ G participation to assist w/ safe d/c planning/recommendations as needed    DEVYN Padgett, OTR/L

## 2020-01-06 NOTE — PHYSICAL THERAPY NOTE
PHYSICAL THERAPY TREATMENT NOTE          Patient Name: Michelle Stokes  DFRGX'T Date: 1/6/2020 01/06/20 1021   Pain Assessment   Pain Assessment 0-10   Pain Score 7   Pain Type Acute pain;Surgical pain   Pain Location Hip   Pain Orientation Right   Patient's Stated Pain Goal No pain   Hospital Pain Intervention(s) Repositioned; Ambulation/increased activity; Rest   Response to Interventions tolerated   Restrictions/Precautions   Weight Bearing Precautions Per Order Yes   RLE Weight Bearing Per Order WBAT   Braces or Orthoses Other (Comment)  (ABD pillow)   Other Precautions WBS;THR;Fall Risk;Pain  (posterior hip precautions)   General   Chart Reviewed Yes   Response to Previous Treatment Patient with no complaints from previous session  Family/Caregiver Present No   Cognition   Overall Cognitive Status WFL   Arousal/Participation Alert   Attention Within functional limits   Orientation Level Oriented X4   Memory Decreased recall of precautions   Following Commands Follows all commands and directions without difficulty   Comments Pt able to recall 1/3 hip precautions  Pt re-educated  Subjective   Subjective Pt pleasant and agreeable to participate in therapy session  Bed Mobility   Supine to Sit 4  Minimal assistance   Additional items Assist x 2; Increased time required;Verbal cues;LE management   Additional Comments Supine in bed upon PT arrival   Pt left upright in bedside chair with all needs in reach at end of therapy session  Transfers   Sit to Stand 4  Minimal assistance   Additional items Assist x 2; Increased time required;Verbal cues   Stand to Sit 4  Minimal assistance   Additional items Assist x 2; Increased time required;Verbal cues   Additional Comments Transfers with [facility] for hand placement and safety  Ambulation/Elevation   Gait pattern Excessively slow; Step to;Short stride;Decreased foot clearance;Decreased R stance  (decreased WBing through R LE)   Gait Assistance 4  Minimal assist   Additional items Assist x 2;Verbal cues   Assistive Device Rolling walker   Distance 3 ft from bed to chair   Stair Management Assistance Not tested   Balance   Static Sitting Fair   Dynamic Sitting Fair -   Static Standing Poor +   Dynamic Standing Poor   Ambulatory Poor   Endurance Deficit   Endurance Deficit Yes   Endurance Deficit Description pain, fatigue   Activity Tolerance   Activity Tolerance Patient limited by pain; Patient limited by fatigue   Medical Staff Made Aware PCA   Nurse Made Aware RN cleared pt to be seen by PT   Exercises   Hip Abduction Sitting;10 reps;Right  (x2)   Knee AROM Long Arc Quad Sitting;10 reps;Bilateral  (x2)   Ankle Pumps Sitting;10 reps;Bilateral  (x2)   Assessment   Prognosis Good   Problem List Decreased strength;Decreased endurance; Impaired balance;Decreased mobility;Pain;Orthopedic restrictions   Assessment Pt seen for PT treatment session with focus on therapeutic activity and gait  Pt able to perform all bed mobility, functional transfers, and functional mobility with decreased assist today  Pt requires Min A x 2 for supine>sit  Sitting EOB, pt requires supervision-Min A for to maintain stability  Pt performed STS and ambulation 3 ft from bed to chair with RW  Pt performed LE therex as outlined above  Pt unable to perform therex throughout full ROM on R 2/2 pain and strength deficits  Pt left upright in bedside chair with all needs in reach  Pt will benefit from skilled therapy in order to address current impairments and functional limitations  PT to follow pt and recommending rehab once medically cleared  Barriers to Discharge Decreased caregiver support; Inaccessible home environment   Goals   Patient Goals to get a commode in her room   STG Expiration Date 01/19/19   PT Treatment Day 1   Plan   Treatment/Interventions Functional transfer training;LE strengthening/ROM; Therapeutic exercise; Endurance training;Patient/family training;Equipment eval/education; Bed mobility;Gait training;Spoke to nursing   Progress Progressing toward goals   PT Frequency Other (Comment)  (3-5x/wk)   Recommendation   Recommendation Post acute IP rehab   Equipment Recommended Walker   PT - OK to Discharge Yes  (to rehab once medically cleared)     July Wooten, PT, DPT

## 2020-01-06 NOTE — PLAN OF CARE
Problem: PHYSICAL THERAPY ADULT  Goal: Performs mobility at highest level of function for planned discharge setting  See evaluation for individualized goals  Outcome: Progressing  Note:   Prognosis: Good  Problem List: Decreased strength, Decreased endurance, Impaired balance, Decreased mobility, Pain, Orthopedic restrictions  Assessment: Pt seen for PT treatment session with focus on therapeutic activity and gait  Pt able to perform all bed mobility, functional transfers, and functional mobility with decreased assist today  Pt requires Min A x 2 for supine>sit  Sitting EOB, pt requires supervision-Min A for to maintain stability  Pt performed STS and ambulation 3 ft from bed to chair with RW  Pt performed LE therex as outlined above  Pt unable to perform therex throughout full ROM on R 2/2 pain and strength deficits  Pt left upright in bedside chair with all needs in reach  Pt will benefit from skilled therapy in order to address current impairments and functional limitations  PT to follow pt and recommending rehab once medically cleared  Barriers to Discharge: Decreased caregiver support, Inaccessible home environment     Recommendation: Post acute IP rehab     PT - OK to Discharge: Yes(to rehab once medically cleared)    See flowsheet documentation for full assessment

## 2020-01-06 NOTE — SOCIAL WORK
YASMIN contacted [facility]; Aging and Adult Services [phone] to inquire about previous admission PASRR determination  CM was notified that it was from December  CM was informed that office is closed for the day, CM left the office a message  [facility] was notified  John reported that pt would be accepted at [facility]  Liaison needs OT notes for authorization  YASMIN sent TT to OT therapist      [address] reported that there was no bed at [facility]  [facility] has a bed for the pt  CM spoke with pt at bedside regarding placement  CM asked pt if she was agreeable to bed at [facility] instead  Pt agreeable  Pt reported that she was not going anywhere because she cannot walk  CM explained that the facility was going to increase her therapies and assist her to be able to ambulate and walk without as much assist  Pt was agreeable  CM reported to [address]  OT reached out to CM and confirmed that they evaluated pt and are entering notes

## 2020-01-06 NOTE — PROGRESS NOTES
[facility]  Progress Note - Rajeev Lucreo 1960, 61 y o  female MRN: [id number]    Unit/Bed#: -01 Encounter: [id number]    Primary Care Provider: Leola Mg MD   Date and time admitted to hospital: 12/27/2019 10:59 AM      * Closed right hip fracture [facility])  Assessment & Plan  · S/p OR with orthopedics on 1/2   · CT femur R w/o contrast (12/31/19): Obliquely oriented almost nondisplaced proximal femoral shaft fracture around the site of the femoral stem without significant displacement of the hardware  Moderate amount of soft tissue calcification in the vicinity of the injury  Diminished density of the vastus intermedius muscle probably representing some muscular edema in the deep compartment  No dislocation at the hip  · Pain control PRN  · PT/OT evaluation appreciated will need rehab   · Continue Gabapentin 100 mg TID    Urinary retention  Assessment & Plan  · Pt with urinary retention, suspect in setting of post-operative state/ambulatory dysfunction  · Bernardo placement  · Consider urology eval    NMS (neuroleptic malignant syndrome)  Assessment & Plan  · Was transferred to ICU for suspected NMS and treated for this, this occurred post-operatively with rigidity, tremors, hyperthermia, tachycardia, delirium   · Neurology, toxicology, psychiatry following  · Geodon has been discontinued, do not resume on discharge    Seroquel - resume tonight at 50 mg qhs, can increase dose every 2 days by 50 mg until resume home dosing of 400 mg qhs   · This is now improving/resolved, taper Valium today to BID dosing   · Would strongly recommend that if patient would require any other procedure with anesthesia to make them aware of reaction she had     Iron deficiency anemia secondary to inadequate dietary iron intake  Assessment & Plan  · Continue iron supplementation  · Hgb this AM stable 8 5 would monitor CBC     Chronic bilateral low back pain without sciatica  Assessment & Plan  · Continue Tylenol, oxycodone, dilaudid for breakthrough  · Gabapentin t i d  · PT/OT    ABIMAEL (generalized anxiety disorder)  Assessment & Plan  · Home regimen of Lorazepam p r n, paroxetine daily, Geodon b i d, Seroquel at bedtime  · This regimen has been discontinued given concern for NMS - Geodon and Paxil d/c  Psychiatry input appreciated  Resume Seroquel at 50 mg qhs, increase dose every 2 days by 50 mg until back at home dosing     Schizoaffective disorder, depressive type ([facility] [address] )  Assessment & Plan  · Home regimen consists of 400 mg Seroquel at bedtime, Geodon 80 mg BID and Ativan p r n  · Patient was transferred to ICU for suspected NMS  This has been treated  · Appreciate psychiatry input  Will plan to resume Seroquel at this time starting at 50 mg qhs, and increase dose by 50 mg every 2 days until back on 400 mg dosing  Do not resume Geodon or Paxil    Non-traumatic rhabdomyolysis  Assessment & Plan  · Suspected 2/2 NMS   · Improving     Acquired hypothyroidism  Assessment & Plan  · Continue daily levothyroxine    Localized osteoporosis with current pathological fracture with routine healing  Assessment & Plan  · Vitamin-D supplementation  · Outpatient endocrine follow-up    Chronic hyponatremia  Assessment & Plan  · Chronic and stable  · Psychiatric meds likely contribute to hyponatremia    VTE Pharmacologic Prophylaxis:   Pharmacologic: Enoxaparin (Lovenox)  Mechanical VTE Prophylaxis in Place: Yes    Patient Centered Rounds: I have performed bedside rounds with nursing staff today  Discussions with Specialists or Other Care Team Provider: RN    Education and Discussions with Family / Patient: patient  Time Spent for Care: 20 minutes  More than 50% of total time spent on counseling and coordination of care as described above      Current Length of Stay: 8 day(s)    Current Patient Status: Inpatient   Certification Statement: The patient will continue to require additional inpatient hospital stay due to weaning IV valium, monitor with NMS, needs placement    Discharge Plan: when weaned from IV valium successfully without recurrence of symptoms to rehab     Code Status: Level 1 - Full Code      Subjective:   Patient reports she has not been able to get any sleep  She d/w psychiatry and she is happy about being able to resume seroquel  She is okay with not resuming Geodon  She has no other complaints     Objective:     Vitals:   Temp (24hrs), Av °F (36 7 °C), Min:97 7 °F (36 5 °C), Max:98 5 °F (36 9 °C)    Temp:  [97 7 °F (36 5 °C)-98 5 °F (36 9 °C)] 97 8 °F (36 6 °C)  HR:  [] 89  Resp:  [14-19] 17  BP: (127-140)/(74-85) 135/80  SpO2:  [99 %-100 %] 99 %  Body mass index is 30 46 kg/m²  Input and Output Summary (last 24 hours): Intake/Output Summary (Last 24 hours) at 2020 1801  Last data filed at 2020 1758  Gross per 24 hour   Intake 960 ml   Output 3942 ml   Net -2982 ml       Physical Exam:     Physical Exam   Constitutional: She is oriented to person, place, and time  She appears well-developed and well-nourished  No distress  HENT:   Head: Normocephalic and atraumatic  Eyes: EOM are normal  No scleral icterus  Neck: Normal range of motion  Neck supple  Cardiovascular: Normal rate and regular rhythm  Pulmonary/Chest: Effort normal and breath sounds normal  No respiratory distress  Abdominal: Soft  Bowel sounds are normal  She exhibits no distension  There is no tenderness  Musculoskeletal: Normal range of motion  She exhibits no edema  Neurological: She is alert and oriented to person, place, and time  Skin: Skin is warm and dry  She is not diaphoretic  Psychiatric: She has a normal mood and affect  Her behavior is normal    Vitals reviewed      Additional Data:     Labs:    Results from last 7 days   Lab Units 20  1325 20  0916   WBC Thousand/uL 6 96 7 90   HEMOGLOBIN g/dL 8 5* 7 7*   HEMATOCRIT % 26 7* 23 5*   PLATELETS Thousands/uL 299 236   NEUTROS PCT %  --  79*   LYMPHS PCT %  --  13*   MONOS PCT %  --  6   EOS PCT %  --  1     Results from last 7 days   Lab Units 01/05/20  0527   SODIUM mmol/L 135*   POTASSIUM mmol/L 3 5   CHLORIDE mmol/L 102   CO2 mmol/L 29   BUN mg/dL 4*   CREATININE mg/dL 0 35*   ANION GAP mmol/L 4   CALCIUM mg/dL 8 4   ALBUMIN g/dL 2 0*   TOTAL BILIRUBIN mg/dL 0 28   ALK PHOS U/L 117*   ALT U/L 96*   AST U/L 196*   GLUCOSE RANDOM mg/dL 101     Results from last 7 days   Lab Units 01/02/20  0523   INR  1 01     Results from last 7 days   Lab Units 01/05/20  1157 01/05/20  0546 01/04/20  2358 01/04/20  1719 01/04/20  1138 01/04/20  0028 01/03/20  1715 01/03/20  1544 01/02/20  1929   POC GLUCOSE mg/dl 96 108 117 113 119 122 102 104 162*         Results from last 7 days   Lab Units 01/04/20  0036 01/03/20  1010 01/03/20  0738 01/03/20  0336 01/03/20  0328   LACTIC ACID mmol/L 1 5 1 8 3 7* 3 4*  --    PROCALCITONIN ng/ml  --   --   --   --  <0 05           * I Have Reviewed All Lab Data Listed Above  * Additional Pertinent Lab Tests Reviewed: All Labs Within Last 24 Hours Reviewed    Imaging:    Imaging Reports Reviewed Today Include: none  Imaging Personally Reviewed by Myself Includes:  none    Recent Cultures (last 7 days):     Results from last 7 days   Lab Units 01/03/20  0336 01/03/20  0335   BLOOD CULTURE  No Growth at 72 hrs  No Growth at 72 hrs         Last 24 Hours Medication List:     Current Facility-Administered Medications:  acetaminophen 975 mg Oral [facility] Klaus Yolanda, PA-WAYNE   aluminum-magnesium hydroxide-simethicone 30 mL Oral Q4H PRN Klaus Guest, PA-WAYNE   calcium carbonate 1 tablet Oral BID With Meals Klaus Guerrero, PA-WAYNE   calcium carbonate 500 mg Oral Daily PRN Klaus Yolanda, PA-WAYNE   cholecalciferol 2,000 Units Oral Daily Klaus Yolanda, CORY   diazepam 10 mg Intravenous Q12H Yareli Lewis PA-C   docusate sodium 100 mg Oral BID Klausgary Guerrero PA-C   enoxaparin 40 mg Subcutaneous Q24H [address] Connie Gloria PA-C   folic acid 1 mg Oral Daily Frank Vaughn PA-C   gabapentin 100 mg Oral TID LINDA Chan-WAYNE   iron sucrose 300 mg Intravenous Every Other Day Waleska Brito DO   levothyroxine 25 mcg Oral Early Morning Frank Vaughn, CORY   lidocaine 2 patch Topical Daily LINDA Chan-C   LORazepam 2 mg Oral Q8H PRN LINDA Chan-WAYNE   methocarbamol 750 mg Oral Q6H [address] Frank Vaughn, PA-C   multivitamin-minerals 1 tablet Oral Daily Frank Vaughn PA-C   oxyCODONE 2 5 mg Oral Q4H PRN Darwyn Dural, PA-WAYNE   oxyCODONE 5 mg Oral Q4H PRN Darwyn Dural, PA-WAYNE   pantoprazole 40 mg Oral BID AC LINDA Chan-WAYNE   polyethylene glycol 17 g Oral Daily Clyde Granado PA-C        Today, Patient Was Seen By: Kody Santoyo PA-C    ** Please Note: Dictation voice to text software may have been used in the creation of this document   **

## 2020-01-06 NOTE — ASSESSMENT & PLAN NOTE
· Home regimen consists of 400 mg Seroquel at bedtime, Geodon 80 mg BID and Ativan p r n  · Patient was transferred to ICU for suspected NMS  This has been treated  · Appreciate psychiatry input  Will plan to resume Seroquel at this time starting at 50 mg qhs, and increase dose by 50 mg every 2 days until back on 400 mg dosing    Do not resume Geodon or Paxil

## 2020-01-06 NOTE — ASSESSMENT & PLAN NOTE
· S/p OR with orthopedics on 1/2   · CT femur R w/o contrast (12/31/19): Obliquely oriented almost nondisplaced proximal femoral shaft fracture around the site of the femoral stem without significant displacement of the hardware  Moderate amount of soft tissue calcification in the vicinity of the injury  Diminished density of the vastus intermedius muscle probably representing some muscular edema in the deep compartment  No dislocation at the hip    · Pain control PRN  · PT/OT evaluation appreciated will need rehab   · Continue Gabapentin 100 mg TID

## 2020-01-06 NOTE — ASSESSMENT & PLAN NOTE
· Was transferred to ICU for suspected NMS and treated for this, this occurred post-operatively with rigidity, tremors, hyperthermia, tachycardia, delirium   · Neurology, toxicology, psychiatry following  · Geodon has been discontinued, do not resume on discharge    Seroquel - resume tonight at 50 mg qhs, can increase dose every 2 days by 50 mg until resume home dosing of 400 mg qhs   · This is now improving/resolved, taper Valium today to BID dosing   · Would strongly recommend that if patient would require any other procedure with anesthesia to make them aware of reaction she had

## 2020-01-06 NOTE — ASSESSMENT & PLAN NOTE
· Home regimen of Lorazepam p r n, paroxetine daily, Geodon b i d, Seroquel at bedtime  · This regimen has been discontinued given concern for NMS - Geodon and Paxil d/nata  Psychiatry input appreciated    Resume Seroquel at 50 mg qhs, increase dose every 2 days by 50 mg until back at home dosing

## 2020-01-06 NOTE — SOCIAL WORK
CM sent TT to [facility] provider for medical update  Options letter attached to [facility] referrals  [address] willing to accept pending authorization  Possible d/c stable tomorrow as per [facility] provider  YASMIN notified OO

## 2020-01-06 NOTE — PROGRESS NOTES
Progress Note - Orthopedics   Bob Dakin 61 y o  female MRN: [id number]  Unit/Bed#: BE -01      Subjective:    61 y  o female POD 4 R rev ELIZABETH   Doing well, complaining of nightmares this AM      Labs:  0   Lab Value Date/Time    HCT 23 5 (L) 01/05/2020 0916    HCT 20 6 (L) 01/05/2020 0527    HCT 22 3 (L) 01/04/2020 1424    HGB 7 7 (L) 01/05/2020 0916    HGB 6 7 (LL) 01/05/2020 0527    HGB 7 6 (L) 01/04/2020 1424    INR 1 01 01/02/2020 0523    WBC 7 90 01/05/2020 0916    WBC 6 35 01/05/2020 0527    WBC 10 24 (H) 01/04/2020 0846    CRP 4 8 (H) 12/04/2019 1004       Meds:    Current Facility-Administered Medications:     acetaminophen (TYLENOL) tablet 975 mg, 975 mg, Oral, Q8H [address], Nadir Archibald PA-C, 975 mg at 01/06/20 0525    aluminum-magnesium hydroxide-simethicone (MYLANTA) 200-200-20 mg/5 mL oral suspension 30 mL, 30 mL, Oral, Q4H PRN, Nadir Archibald PA-C    calcium carbonate (OYSTER SHELL,OSCAL) 500 mg tablet 1 tablet, 1 tablet, Oral, BID With Meals, Nadir Archibald PA-C, 1 tablet at 01/05/20 1714    calcium carbonate (TUMS) chewable tablet 500 mg, 500 mg, Oral, Daily PRN, Nadir Archibald PA-C, 500 mg at 01/04/20 0831    cholecalciferol (VITAMIN D3) tablet 2,000 Units, 2,000 Units, Oral, Daily, Nadir Archibald PA-C, 2,000 Units at 01/05/20 0831    diazepam (VALIUM) injection 10 mg, 10 mg, Intravenous, Q8H, Connie Gloria PA-C, 10 mg at 01/05/20 1715    docusate sodium (COLACE) capsule 100 mg, 100 mg, Oral, BID, Nadir Archibald PA-C, 100 mg at 01/05/20 1713    enoxaparin (LOVENOX) subcutaneous injection 40 mg, 40 mg, Subcutaneous, Q24H [facility], Connie Gloria PA-C, 40 mg at 63/41/87 7668    folic acid (FOLVITE) tablet 1 mg, 1 mg, Oral, Daily, Nadir Archibald PA-C, 1 mg at 01/05/20 3887    gabapentin (NEURONTIN) capsule 100 mg, 100 mg, Oral, TID, ALLI ReeseC, 100 mg at 01/05/20 2128    iron sucrose (VENOFER) 300 mg in sodium chloride 0 9 % 250 mL IVPB, 300 mg, Intravenous, Every Other Day, Roni Anguiano DO, 300 mg at 01/05/20 0900    levothyroxine tablet 25 mcg, 25 mcg, Oral, Early Morning, Piyush Valdovinos PA-C, 25 mcg at 01/06/20 0525    lidocaine (LIDODERM) 5 % patch 2 patch, 2 patch, Topical, Daily, Piyush Valdovinos PA-C, 2 patch at 01/05/20 3545    LORazepam (ATIVAN) tablet 2 mg, 2 mg, Oral, Q8H PRN, Piyush Valdovinos PA-C, 2 mg at 01/04/20 1611    methocarbamol (ROBAXIN) tablet 750 mg, 750 mg, Oral, Q6H [address], Piyush Valdovinos PA-C, 750 mg at 01/06/20 0525    multivitamin-minerals (CENTRUM) tablet 1 tablet, 1 tablet, Oral, Daily, Piyush Valdovinos PA-C, 1 tablet at 01/05/20 0831    oxyCODONE (ROXICODONE) IR tablet 2 5 mg, 2 5 mg, Oral, Q4H PRN, Raford MedicoCORY    oxyCODONE (ROXICODONE) oral solution 5 mg, 5 mg, Oral, Q4H PRN, Yareli Gloria PA-C, 5 mg at 01/05/20 1039    pantoprazole (PROTONIX) EC tablet 40 mg, 40 mg, Oral, BID AC, Piyush Valdovinos PA-C, 40 mg at 01/06/20 0524    polyethylene glycol (MIRALAX) packet 17 g, 17 g, Oral, Daily, Connie Gloria PA-C, 17 g at 01/05/20 1355    Blood Culture:   Lab Results   Component Value Date    BLOODCX No Growth at 48 hrs  01/03/2020       Wound Culture:   No results found for: WOUNDCULT    Ins and Outs:  I/O last 24 hours: In: 1494 2 [P O :420; I V :1074 2]  Out: 1050 [Urine:1050]          Physical:  Vitals:    01/05/20 2223   BP: 127/74   Pulse: 104   Resp: 19   Temp: 98 5 °F (36 9 °C)   SpO2: 99%     Musculoskeletal: right Lower Extremity  · Dressing c/d/i  · TTP around incision  · SILT s/s/sp/dp/t    · +fhl/ehl, +ankle dorsi/plantar flexion  · Foot wwp    Assessment:    61 y  o female POD 4 R rev ELIZABETH  Doing well  Plan:  · WBAT RLE  · Greater than 2 gram drop which qualifies for diagnosis of acute blood loss anemia, will monitor and administer IVF/prbc as indicated  Hgb 7 7 this am, would recommend transfusion     · PT/OT  · Pain control  · DVT ppx  · Dispo: Ortho will follow    Mehran Balderas, MD

## 2020-01-06 NOTE — CONSULTS
Consultation - [facility] ANNAMARIA Plascencia 61 y o  female MRN: [id number]  Unit/Bed#: -01 Encounter: [id number]      Chief Complaint:  I am doing better    History of Present Illness   Physician Requesting Consult: Aubrey Ribera DO  Reason for Consult / Principal Problem:  Schizoaffective disorder depressed type generalized anxiety disorder    Junie Pina is a 61 y o  female with schizoaffective disorder depressed type , generalized anxiety disorder, iron deficiency anemia, chronic bilateral lower back pain and hyperthyroidism , status post right hip replacement presents with ambulatory dysfunction and hip pain  While patient was in hospital she developed neuroleptic malignant syndrome and his psychotropic medication will discontinue at this moment there was request evaluation to restart psychotropic medication is possible  She had been in the same psychotropic medications for several years but we do not know the compliance of this patient  At this moment patient is stable, she denies suicidal thoughts plans or intent, she denies any psychotic symptoms  Psychiatric Review Of Systems:  sleep: no  appetite changes: no  weight changes: no  energy/anergy: no  interest/pleasure/anhedonia: no  somatic symptoms: no  anxiety/panic: no  rody: no  guilty/hopeless: no  self injurious behavior/risky behavior: no    Historical Information   Past Psychiatric History:   She had multiple inpatient psych admission at [facility], [facility], [facility]    Last admission was in 2018  Currently in treatment with primary physician  Past Suicide attempts:  Yes she cut her wrist in the past  Past Violent behavior:  None  Past Psychiatric medication trial:  Paxil, Tofranil, risperidone, Seroquel, Zyprexa, Geodon, Ativan, clonazepam    Substance Abuse History:  She denies any drugs or alcohol history    I have assessed this patient for substance use within the past 12 months History of IP/OP rehabilitation program:  None  Smoking history:  Never smoked  Family Psychiatric History:   Her mother had depression, no family history of substance abuse or suicidal attempt    Social History  Education: post college graduate work or degree  Learning Disabilities: None  Marital history: single  Living arrangement, social support: She live with her mother  Occupational History: on permanent disability  Functioning Relationships:  Limited support system    Other Pertinent History: No legal or  history    Traumatic History:   Abuse: None  Other Traumatic Events: None    Past Medical History:   Diagnosis Date    Anxiety     Back pain at L4-L5 level     Schreiber esophagus     Bulimia     Disease of thyroid gland     hypothyroid    GERD (gastroesophageal reflux disease)     Hyperlipidemia     Hypothyroidism     Leukopenia     Rheumatoid arthritis involving right hip (HCC)     Sciatica     Seizure (HCC)     due to medication mix up 8/2018 only one historically    Synovial cyst of lumbar spine     Vertigo     Weight gain        Medical Review Of Systems:  Review of Systems - Negative except he pain, anxiety, dry mouth, all other systems reviewed were negative    Meds/Allergies   current meds:   Current Facility-Administered Medications   Medication Dose Route Frequency    acetaminophen (TYLENOL) tablet 975 mg  975 mg Oral Q8H [address]    aluminum-magnesium hydroxide-simethicone (MYLANTA) 200-200-20 mg/5 mL oral suspension 30 mL  30 mL Oral Q4H PRN    calcium carbonate (OYSTER SHELL,OSCAL) 500 mg tablet 1 tablet  1 tablet Oral BID With Meals    calcium carbonate (TUMS) chewable tablet 500 mg  500 mg Oral Daily PRN    cholecalciferol (VITAMIN D3) tablet 2,000 Units  2,000 Units Oral Daily    diazepam (VALIUM) injection 10 mg  10 mg Intravenous Q8H    docusate sodium (COLACE) capsule 100 mg  100 mg Oral BID    enoxaparin (LOVENOX) subcutaneous injection 40 mg  40 mg Subcutaneous K59G [address]    folic acid (FOLVITE) tablet 1 mg  1 mg Oral Daily    gabapentin (NEURONTIN) capsule 100 mg  100 mg Oral TID    iron sucrose (VENOFER) 300 mg in sodium chloride 0 9 % 250 mL IVPB  300 mg Intravenous Every Other Day    levothyroxine tablet 25 mcg  25 mcg Oral Early Morning    lidocaine (LIDODERM) 5 % patch 2 patch  2 patch Topical Daily    LORazepam (ATIVAN) tablet 2 mg  2 mg Oral Q8H PRN    methocarbamol (ROBAXIN) tablet 750 mg  750 mg Oral Q6H [address]    multivitamin-minerals (CENTRUM) tablet 1 tablet  1 tablet Oral Daily    oxyCODONE (ROXICODONE) IR tablet 2 5 mg  2 5 mg Oral Q4H PRN    oxyCODONE (ROXICODONE) oral solution 5 mg  5 mg Oral Q4H PRN    pantoprazole (PROTONIX) EC tablet 40 mg  40 mg Oral BID AC    polyethylene glycol (MIRALAX) packet 17 g  17 g Oral Daily     Allergies   Allergen Reactions    Risperdal [Risperidone] Shortness Of Breath     Rapid heart beat, SOB    Zyprexa [Olanzapine] Shortness Of Breath     Rapid heartbeat    Latex Rash     Band aids, adhesives wears normal underwear, can eat bananas  USE PAPER TAPE       Objective   Vital signs in last 24 hours:  Temp:  [97 7 °F (36 5 °C)-98 5 °F (36 9 °C)] 97 7 °F (36 5 °C)  HR:  [] 93  Resp:  [14-19] 14  BP: (100-140)/(69-85) 140/85      Intake/Output Summary (Last 24 hours) at 1/6/2020 1206  Last data filed at 1/6/2020 0900  Gross per 24 hour   Intake 240 ml   Output 1392 ml   Net -1152 ml       Mental Status Evaluation:  Appearance:  disheveled and older than stated age   Behavior:  Cooperative   Speech:  normal pitch and normal volume   Mood:  anxious   Affect:  mood-congruent   Language: naming objects and repeating phrases   Thought Process:  goal directed   Associations: intact associations   Thought Content:  normal   Perceptual Disturbances: None   Risk Potential: She denies suicidal or homicidal ideation, plan or intent   Sensorium:  person, place, time/date, situation, day of week and month of year   Memory: recent and remote memory grossly intact   Cognition:  grossly intact   Consciousness:  alert and awake    Attention: attention span and concentration were age appropriate   Intellect: within normal limits   Fund of Knowledge: awareness of current events: Good, past history: Good and vocabulary: Good   Insight:  fair   Judgment: fair   Muscle Strength and Tone: Within normal limits   Gait/Station: Unable to ambulate at this time   Motor Activity: no abnormal movements     Lab Results:    I have personally reviewed all pertinent laboratory/tests results  Labs in last 72 hours:   Recent Labs     01/03/20 2026 01/05/20  0527 01/05/20  0916   WBC  --    < > 6 35 7 90   RBC  --    < > 2 33* 2 67*   HGB  --    < > 6 7* 7 7*   HCT  --    < > 20 6* 23 5*   PLT  --    < > 228 236   RDW  --    < > 17 4* 17 3*   NEUTROABS  --    < > 5 05 6 34   SODIUM  --    < > 135*  --    K  --    < > 3 5  --    CL  --    < > 102  --    CO2  --    < > 29  --    BUN  --    < > 4*  --    CREATININE  --    < > 0 35*  --    GLUC  --    < > 101  --    CALCIUM  --    < > 8 4  --    AST  --    < > 196*  --    ALT  --    < > 96*  --    ALKPHOS  --    < > 117*  --    TP  --    < > 4 8*  --    ALB  --    < > 2 0*  --    TBILI  --    < > 0 28  --    [id number] 0 350*  --   --   --    FREET4 0 88  --   --   --     < > = values in this interval not displayed  Code Status: )Level 1 - Full Code    Assessment/Plan     Assessment:  Alejandro Penn is a 61 y o  female with schizoaffective disorder and generalized anxiety disorder presented to the hospital with ambulatory dysfunction, patient was in the hospital before that and she had a hip replacement and was sent to a rehabilitation but she signed AMA, patient come back with same symptoms while in admission patient became confused and encephalopathic and was suspicious of neuroleptic malignant syndrome and all psychotropic medication will discontinue    At this moment patient is a baseline, patient denies suicidal thoughts plans or intent, patient is not psychotic but still have issue with sleep  Diagnosis:  Schizoaffective disorder depressed type  Generalized anxiety disorder  Plan:   Continue medical management  We can restart Seroquel 50 mg p o  HS for 2 days and increase in 50 every 2 days until we reached a maximum of 400  Discontinue Geodon  According to patient she was not taking the Paxil  No intervention at this moment  Discussed with primary team  I will sign off  Risks, benefits and possible side effects of Medications:   Risks, benefits, and possible side effects of medications explained to patient and patient verbalizes understanding            Olivia Ramos MD

## 2020-01-07 LAB
ALBUMIN SERPL BCP-MCNC: 2 G/DL (ref 3.5–5)
ALP SERPL-CCNC: 144 U/L (ref 46–116)
ALT SERPL W P-5'-P-CCNC: 84 U/L (ref 12–78)
ANION GAP SERPL CALCULATED.3IONS-SCNC: 4 MMOL/L (ref 4–13)
AST SERPL W P-5'-P-CCNC: 88 U/L (ref 5–45)
BILIRUB SERPL-MCNC: 0.32 MG/DL (ref 0.2–1)
BUN SERPL-MCNC: 5 MG/DL (ref 5–25)
CALCIUM SERPL-MCNC: 8.7 MG/DL (ref 8.3–10.1)
CHLORIDE SERPL-SCNC: 101 MMOL/L (ref 100–108)
CK MB SERPL-MCNC: 4.7 NG/ML (ref 0–5)
CK MB SERPL-MCNC: <1 % (ref 0–2.5)
CK SERPL-CCNC: 1020 U/L (ref 26–192)
CO2 SERPL-SCNC: 31 MMOL/L (ref 21–32)
CREAT SERPL-MCNC: 0.47 MG/DL (ref 0.6–1.3)
ERYTHROCYTE [DISTWIDTH] IN BLOOD BY AUTOMATED COUNT: 18.1 % (ref 11.6–15.1)
GFR SERPL CREATININE-BSD FRML MDRD: 108 ML/MIN/1.73SQ M
GLUCOSE SERPL-MCNC: 86 MG/DL (ref 65–140)
HCT VFR BLD AUTO: 25 % (ref 34.8–46.1)
HGB BLD-MCNC: 8 G/DL (ref 11.5–15.4)
MCH RBC QN AUTO: 29.2 PG (ref 26.8–34.3)
MCHC RBC AUTO-ENTMCNC: 32 G/DL (ref 31.4–37.4)
MCV RBC AUTO: 91 FL (ref 82–98)
PLATELET # BLD AUTO: 272 THOUSANDS/UL (ref 149–390)
PMV BLD AUTO: 9 FL (ref 8.9–12.7)
POTASSIUM SERPL-SCNC: 3.8 MMOL/L (ref 3.5–5.3)
PROT SERPL-MCNC: 5.4 G/DL (ref 6.4–8.2)
RBC # BLD AUTO: 2.74 MILLION/UL (ref 3.81–5.12)
SODIUM SERPL-SCNC: 136 MMOL/L (ref 136–145)
WBC # BLD AUTO: 4.93 THOUSAND/UL (ref 4.31–10.16)

## 2020-01-07 PROCEDURE — 99232 SBSQ HOSP IP/OBS MODERATE 35: CPT | Performed by: PHYSICIAN ASSISTANT

## 2020-01-07 PROCEDURE — 82553 CREATINE MB FRACTION: CPT | Performed by: PHYSICIAN ASSISTANT

## 2020-01-07 PROCEDURE — 80053 COMPREHEN METABOLIC PANEL: CPT | Performed by: PHYSICIAN ASSISTANT

## 2020-01-07 PROCEDURE — 97535 SELF CARE MNGMENT TRAINING: CPT

## 2020-01-07 PROCEDURE — 97530 THERAPEUTIC ACTIVITIES: CPT

## 2020-01-07 PROCEDURE — 85027 COMPLETE CBC AUTOMATED: CPT | Performed by: PHYSICIAN ASSISTANT

## 2020-01-07 PROCEDURE — NS001 PR NO SIGNATURE OR ATTESTATION: Performed by: ORTHOPAEDIC SURGERY

## 2020-01-07 PROCEDURE — 97116 GAIT TRAINING THERAPY: CPT

## 2020-01-07 PROCEDURE — 82550 ASSAY OF CK (CPK): CPT | Performed by: PHYSICIAN ASSISTANT

## 2020-01-07 PROCEDURE — 97110 THERAPEUTIC EXERCISES: CPT

## 2020-01-07 RX ORDER — QUETIAPINE FUMARATE 25 MG/1
50 TABLET, FILM COATED ORAL
Status: DISCONTINUED | OUTPATIENT
Start: 2020-01-07 | End: 2020-01-08

## 2020-01-07 RX ORDER — POLYETHYLENE GLYCOL 3350 17 G/17G
34 POWDER, FOR SOLUTION ORAL DAILY
Status: DISCONTINUED | OUTPATIENT
Start: 2020-01-08 | End: 2020-01-09

## 2020-01-07 RX ORDER — DIAZEPAM 5 MG/ML
5 INJECTION, SOLUTION INTRAMUSCULAR; INTRAVENOUS EVERY 12 HOURS
Status: DISCONTINUED | OUTPATIENT
Start: 2020-01-07 | End: 2020-01-08

## 2020-01-07 RX ORDER — LEVOTHYROXINE SODIUM 0.03 MG/1
25 TABLET ORAL
Status: DISCONTINUED | OUTPATIENT
Start: 2020-01-08 | End: 2020-01-10

## 2020-01-07 RX ADMIN — ACETAMINOPHEN 975 MG: 325 TABLET ORAL at 05:16

## 2020-01-07 RX ADMIN — IRON SUCROSE 300 MG: 20 INJECTION, SOLUTION INTRAVENOUS at 10:06

## 2020-01-07 RX ADMIN — PANTOPRAZOLE SODIUM 40 MG: 20 TABLET, DELAYED RELEASE ORAL at 17:37

## 2020-01-07 RX ADMIN — METHOCARBAMOL 750 MG: 750 TABLET, FILM COATED ORAL at 12:35

## 2020-01-07 RX ADMIN — QUETIAPINE FUMARATE 50 MG: 25 TABLET ORAL at 21:31

## 2020-01-07 RX ADMIN — DOCUSATE SODIUM 100 MG: 100 CAPSULE, LIQUID FILLED ORAL at 17:38

## 2020-01-07 RX ADMIN — LIDOCAINE 2 PATCH: 50 PATCH CUTANEOUS at 08:41

## 2020-01-07 RX ADMIN — PANTOPRAZOLE SODIUM 40 MG: 20 TABLET, DELAYED RELEASE ORAL at 05:15

## 2020-01-07 RX ADMIN — METHOCARBAMOL 750 MG: 750 TABLET, FILM COATED ORAL at 17:38

## 2020-01-07 RX ADMIN — MELATONIN 2000 UNITS: at 08:41

## 2020-01-07 RX ADMIN — CALCIUM 1 TABLET: 500 TABLET ORAL at 08:41

## 2020-01-07 RX ADMIN — POLYETHYLENE GLYCOL 3350 17 G: 17 POWDER, FOR SOLUTION ORAL at 08:41

## 2020-01-07 RX ADMIN — Medication 1 TABLET: at 08:41

## 2020-01-07 RX ADMIN — GABAPENTIN 100 MG: 100 CAPSULE ORAL at 08:42

## 2020-01-07 RX ADMIN — GABAPENTIN 100 MG: 100 CAPSULE ORAL at 17:38

## 2020-01-07 RX ADMIN — ACETAMINOPHEN 975 MG: 325 TABLET ORAL at 21:31

## 2020-01-07 RX ADMIN — GABAPENTIN 100 MG: 100 CAPSULE ORAL at 21:31

## 2020-01-07 RX ADMIN — METHOCARBAMOL 750 MG: 750 TABLET, FILM COATED ORAL at 05:16

## 2020-01-07 RX ADMIN — CALCIUM 1 TABLET: 500 TABLET ORAL at 17:38

## 2020-01-07 RX ADMIN — LEVOTHYROXINE SODIUM 25 MCG: 25 TABLET ORAL at 05:16

## 2020-01-07 RX ADMIN — DIAZEPAM 10 MG: 10 INJECTION, SOLUTION INTRAMUSCULAR; INTRAVENOUS at 10:06

## 2020-01-07 RX ADMIN — ACETAMINOPHEN 975 MG: 325 TABLET ORAL at 14:44

## 2020-01-07 RX ADMIN — FOLIC ACID 1 MG: 1 TABLET ORAL at 08:41

## 2020-01-07 RX ADMIN — ENOXAPARIN SODIUM 40 MG: 40 INJECTION SUBCUTANEOUS at 12:35

## 2020-01-07 RX ADMIN — DOCUSATE SODIUM 100 MG: 100 CAPSULE, LIQUID FILLED ORAL at 08:41

## 2020-01-07 NOTE — SOCIAL WORK
CM contacted [facility] of [address] [place] [id number] after leaving a message yesterday regarding determination letter  CM was sent to Ronie Bumpers and left VM regarding the determination letter and if a new assessment was needed

## 2020-01-07 NOTE — PROGRESS NOTES
[facility]  Progress Note - Domenica Hobbs 1960, 61 y o  female MRN: [id number]    Unit/Bed#: [facility] Encounter: [id number]    Primary Care Provider: Doyle Mckoy MD   Date and time admitted to hospital: 12/27/2019 10:59 AM      * Closed right hip fracture [facility])  Assessment & Plan  · S/p OR with orthopedics on 1/2   · CT femur R w/o contrast (12/31/19): Obliquely oriented almost nondisplaced proximal femoral shaft fracture around the site of the femoral stem without significant displacement of the hardware  Moderate amount of soft tissue calcification in the vicinity of the injury  Diminished density of the vastus intermedius muscle probably representing some muscular edema in the deep compartment  No dislocation at the hip  · Pain control PRN  · PT/OT evaluation appreciated will need rehab   · Continue Gabapentin 100 mg TID    Urinary retention  Assessment & Plan  · Pt with urinary retention, suspect in setting of post-operative state/ambulatory dysfunction  · Bernardo placed 1/6/2020  · Consider urology eval    NMS (neuroleptic malignant syndrome)  Assessment & Plan  · Was transferred to ICU for suspected NMS and treated for this, this occurred post-operatively with rigidity, tremors, hyperthermia, tachycardia, delirium   · Neurology, toxicology, psychiatry following  · Geodon has been discontinued, do not resume on discharge  Seroquel - resumed today 1/7 at 50 mg qhs, can increase dose every 2 days by 50 mg until resume home dosing of 400 mg qhs   · This is now improving/resolved    Continue to taper Valium - decrease dose to 5 mg BID today, consider further decreasing tomorrow  · Would strongly recommend that if patient would require any other procedure with anesthesia to make them aware of this - d/w patient    Iron deficiency anemia secondary to inadequate dietary iron intake  Assessment & Plan  · Continue iron supplementation  · Hgb has been stable   · Monitor CBC     Chronic bilateral low back pain without sciatica  Assessment & Plan  · Continue Tylenol, oxycodone, dilaudid for breakthrough  · Gabapentin t i d  · PT/OT    ABIMAEL (generalized anxiety disorder)  Assessment & Plan  · Home regimen of Lorazepam p r n, paroxetine daily, Geodon b i d, Seroquel at bedtime  · This regimen has been discontinued given concern for NMS - Geodon and Paxil d/c  Psychiatry input appreciated  Resume Seroquel at 50 mg qhs, increase dose every 2 days by 50 mg until back at home dosing     Schizoaffective disorder, depressive type ([facility] [address] )  Assessment & Plan  · Home regimen consists of 400 mg Seroquel at bedtime, Geodon 80 mg BID and Ativan p r n  · Patient was transferred to ICU for suspected NMS  This has been treated  · Appreciate psychiatry input  Will plan to resume Seroquel at this time starting at 50 mg qhs, and increase dose by 50 mg every 2 days until back on 400 mg dosing  Do not resume Geodon or Paxil on discharge  · Seroquel resumed on 1/7/2020     Non-traumatic rhabdomyolysis  Assessment & Plan  · Suspected 2/2 NMS   · Improving     Acquired hypothyroidism  Assessment & Plan  · Continue daily levothyroxine    Localized osteoporosis with current pathological fracture with routine healing  Assessment & Plan  · Vitamin-D supplementation  · Outpatient endocrine follow-up    Chronic hyponatremia  Assessment & Plan  · Chronic and stable  · Psychiatric meds likely contribute to hyponatremia      VTE Pharmacologic Prophylaxis:   Pharmacologic: Enoxaparin (Lovenox)  Mechanical VTE Prophylaxis in Place: Yes    Patient Centered Rounds: I have performed bedside rounds with nursing staff today  Discussions with Specialists or Other Care Team Provider: RN    Education and Discussions with Family / Patient: patient  Time Spent for Care: 30 minutes  More than 50% of total time spent on counseling and coordination of care as described above      Current Length of Stay: 9 day(s)    Current Patient Status: Inpatient   Certification Statement: The patient will continue to require additional inpatient hospital stay due to monitor Hgb, weaning valium    Discharge Plan: when weaned from valium,  Hgb stable, ortho clears to rehab   48-72 hrs  Code Status: Level 1 - Full Code      Subjective:   Patient reports she is feeling okay today  She said she worked with PT today  She states she is constipated  +flatus  Denies pain     Objective:     Vitals:   Temp (24hrs), Av °F (36 7 °C), Min:97 7 °F (36 5 °C), Max:98 2 °F (36 8 °C)    Temp:  [97 7 °F (36 5 °C)-98 2 °F (36 8 °C)] 97 7 °F (36 5 °C)  HR:  [79-88] 88  Resp:  [12-20] 20  BP: (124-147)/(78-91) 142/88  SpO2:  [97 %-99 %] 99 %  Body mass index is 30 27 kg/m²  Input and Output Summary (last 24 hours): Intake/Output Summary (Last 24 hours) at 2020 1558  Last data filed at 2020 1200  Gross per 24 hour   Intake 4090 ml   Output 7500 ml   Net -3410 ml       Physical Exam:     Physical Exam   Constitutional: She is oriented to person, place, and time  No distress  HENT:   Head: Normocephalic and atraumatic  Eyes: EOM are normal  No scleral icterus  Neck: Normal range of motion  Neck supple  Cardiovascular: Normal rate and regular rhythm  Pulmonary/Chest: Effort normal and breath sounds normal  No respiratory distress  She has no wheezes  Abdominal: Soft  Bowel sounds are normal  She exhibits no distension  There is no tenderness  Musculoskeletal: Normal range of motion  She exhibits no edema  Neurological: She is alert and oriented to person, place, and time  Skin: Skin is warm and dry  She is not diaphoretic  Psychiatric: She has a normal mood and affect  Her behavior is normal    Vitals reviewed        Additional Data:     Labs:    Results from last 7 days   Lab Units 20  0509  20  0916   WBC Thousand/uL 4 93   < > 7 90   HEMOGLOBIN g/dL 8 0*   < > 7 7*   HEMATOCRIT % 25 0*   < > 23 5*   PLATELETS Thousands/uL 272   < > 236   NEUTROS PCT %  --   --  79*   LYMPHS PCT %  --   --  13*   MONOS PCT %  --   --  6   EOS PCT %  --   --  1    < > = values in this interval not displayed  Results from last 7 days   Lab Units 01/07/20  0509   SODIUM mmol/L 136   POTASSIUM mmol/L 3 8   CHLORIDE mmol/L 101   CO2 mmol/L 31   BUN mg/dL 5   CREATININE mg/dL 0 47*   ANION GAP mmol/L 4   CALCIUM mg/dL 8 7   ALBUMIN g/dL 2 0*   TOTAL BILIRUBIN mg/dL 0 32   ALK PHOS U/L 144*   ALT U/L 84*   AST U/L 88*   GLUCOSE RANDOM mg/dL 86     Results from last 7 days   Lab Units 01/02/20  0523   INR  1 01     Results from last 7 days   Lab Units 01/05/20  1157 01/05/20  0546 01/04/20  2358 01/04/20  1719 01/04/20  1138 01/04/20  0028 01/03/20  1715 01/03/20  1544 01/02/20  1929   POC GLUCOSE mg/dl 96 108 117 113 119 122 102 104 162*         Results from last 7 days   Lab Units 01/04/20  0036 01/03/20  1010 01/03/20  0738 01/03/20  0336 01/03/20  0328   LACTIC ACID mmol/L 1 5 1 8 3 7* 3 4*  --    PROCALCITONIN ng/ml  --   --   --   --  <0 05           * I Have Reviewed All Lab Data Listed Above  * Additional Pertinent Lab Tests Reviewed: All Labs Within Last 24 Hours Reviewed    Imaging:    Imaging Reports Reviewed Today Include: none  Imaging Personally Reviewed by Myself Includes:  none    Recent Cultures (last 7 days):     Results from last 7 days   Lab Units 01/03/20  0336 01/03/20  0335   BLOOD CULTURE  No Growth After 4 Days  No Growth After 4 Days         Last 24 Hours Medication List:     Current Facility-Administered Medications:  acetaminophen 975 mg Oral [facility] Army Busing, PA-C   aluminum-magnesium hydroxide-simethicone 30 mL Oral Q4H PRN Army Busing, PA-C   calcium carbonate 1 tablet Oral BID With Meals [facility] Busing, PA-C   calcium carbonate 500 mg Oral Daily PRN Army Busing, PA-C   cholecalciferol 2,000 Units Oral Daily Army Busing, PA-C   diazepam 5 mg Intravenous Q12H Yareli Lewis PA-C   docusate sodium 100 [facility] BID [facility]   enoxaparin 40 mg Subcutaneous Q24H [facility] Marie Booker PA-C   folic acid 1 mg [facility] Daily [facility]   gabapentin 100 mg Oral TID [facility]   iron sucrose 300 mg Intravenous Every Other Day Kalina Salazar DO   [START ON 1/8/2020] levothyroxine 25 mcg Oral HS Yareli Lewis PA-C   lidocaine 2 patch Topical Daily [facility]   LORazepam 2 mg Oral Q8H PRN [facility] loya   methocarbamol 750 mg Oral Q6H [facility]   multivitamin-minerals 1 tablet Oral Daily [facility]   oxyCODONE 5 mg Oral Q4H PRN Marie Booker PA-C   pantoprazole 40 mg Oral BID [facility] [place] CORY Ariza   [START ON 1/8/2020] polyethylene glycol 34 g Oral Daily Yareli Lewis PA-C   QUEtiapine 50 mg Oral HS Yareli Lewis PA-C        Today, Patient Was Seen By: Maureen Espinoza PA-C    ** Please Note: Dictation voice to text software may have been used in the creation of this document   **

## 2020-01-07 NOTE — PHYSICAL THERAPY NOTE
PHYSICAL THERAPY TREATMENT NOTE          Patient Name: Gilford Pimple  [id number] Date: 1/7/2020 01/07/20 1300   Pain Assessment   Pain Assessment No/denies pain   Pain Score No Pain  (discomfort at IV site)   Restrictions/Precautions   Weight Bearing Precautions Per Order Yes   RLE Weight Bearing Per Order WBAT   Braces or Orthoses Other (Comment)  (ABD pillow)   Other Precautions WBS;THR;Fall Risk;Multiple lines  (R LE posterior hip precautions)   General   Chart Reviewed Yes   Response to Previous Treatment Patient with no complaints from previous session  Family/Caregiver Present No   Cognition   Overall Cognitive Status WFL   Arousal/Participation Alert   Attention Within functional limits   Orientation Level Oriented X4   Memory Decreased recall of precautions   Following Commands Follows multistep commands with increased time or repetition   Comments Pt able to recall 2/3 hip precautions, and thrid with cue  Subjective   Subjective "I'm feeling ambitious!"   Bed Mobility   Supine to Sit 4  Minimal assistance   Additional items Assist x 2; Increased time required;Verbal cues;LE management;HOB elevated; Bedrails   Additional Comments Supine in bed upon PT arrival   Pt left upright in bedside chair with all needs in reach at end of therapy session  Transfers   Sit to Stand 4  Minimal assistance   Additional items Assist x 2; Increased time required;Verbal cues   Stand to Sit 4  Minimal assistance   Additional items Assist x 2; Increased time required;Verbal cues   Additional Comments Transfers with RW  VC for hand placement and safety  Ambulation/Elevation   Gait pattern Decreased R stance;Decreased foot clearance; Excessively slow; Step to;Short stride; Wide MIGUEL; Improper Weight shift  (heavy reliance on UE)   Gait Assistance 4  Minimal assist   Additional items Assist x 2;Verbal cues  (assist of third for chair follow)   Assistive Device Rolling walker   Distance 12 ft x 1 with RW  (limited by dizziness)   Balance   Static Sitting Fair +   Dynamic Sitting Fair   Static Standing Fair -   Dynamic Standing Poor +   Ambulatory Poor +   Endurance Deficit   Endurance Deficit Yes   Endurance Deficit Description fatigue, dizziness   Activity Tolerance   Activity Tolerance Patient limited by fatigue; Other (Comment)  (dizziness)   Medical Staff Made Aware katia Cyr PCA   Nurse Made Aware RN cleared pt to be seen by PT   Exercises   Hip Abduction Sitting;10 reps;Bilateral  (x3)   Knee AROM Long Arc Quad Sitting;10 reps;Bilateral  (x3)   Ankle Pumps Sitting;10 reps;Bilateral  (x3)   Assessment   Prognosis Good   Problem List Decreased strength;Decreased endurance; Impaired balance;Decreased mobility;Orthopedic restrictions;Pain   Assessment Pt seen for PT treatment session  Pt pleasant and motivated to participate  Pt able to recall 2/3 hip precautions, third with cues, which is improvement from last session  Pt requires Min A x 2 for supine>sit and STS  Pt demonstrated ability to ambulate increased distance 12 ft x 2 with Min A x 2 and assist of third for chair follow  Pt appears to be more able to tolerated WBing through R LE  Pt performed LE therex as outlined above  Pt left sitting upright in bedside chair with all needs in reach  Pt will benefit from skilled therapy in order to address current impairments and functional limitations  PT to follow pt and recommending rehab once medically cleared  Barriers to Discharge Decreased caregiver support; Inaccessible home environment   Goals   Patient Goals to walk more   STG Expiration Date 01/19/20   PT Treatment Day 2   Plan   Treatment/Interventions Functional transfer training;LE strengthening/ROM; Therapeutic exercise; Endurance training;Patient/family training;Equipment eval/education; Bed mobility;Gait training;Spoke to nursing   Progress Progressing toward goals   PT Frequency Other (Comment)  (3-6x/wk)   Recommendation   Recommendation Post acute IP rehab   Equipment Recommended Walker   PT - OK to Discharge Yes  (to rehab once medically cleared)     Doris Peck, PT, DPT

## 2020-01-07 NOTE — RESTORATIVE TECHNICIAN NOTE
Restorative Specialist Mobility Note       Activity: Ambulate in solorio, Stand at bedside, Ambulate in room, Bathroom privileges, Chair, Dangle(Educated/encouraged pt to ambulate with assistance 3-4 x's/day  Chair alarm on  Pt callbell, phone/tray within reach )     Assistive Device: Front wheel walker, Other (Comment)(Assist x1-2 with chair follow   Assisted PT [facility] )       Dc DACOSTA, Restorative Technician, [employer]

## 2020-01-07 NOTE — OCCUPATIONAL THERAPY NOTE
Occupational Therapy Treatment Note:       01/07/20 1510   Restrictions/Precautions   Weight Bearing Precautions Per Order Yes   RLE Weight Bearing Per Order WBAT   Braces or Orthoses   (ABD wedge)   Other Precautions Cognitive; Chair Alarm;WBS;THR;Fall Risk;Pain   Pain Assessment   Pain Score 3   Pain Type Acute pain   Pain Location Hip   Pain Orientation Right   ADL   Where Assessed Commode   LB Dressing Comments Educated on [address] [facility], will continues to practice and reinforce   Toileting Assistance  2  Maximal Assistance   Toileting Deficit Clothing management up;Clothing management down;Perineal hygiene   Toileting Comments BSC level   Bed Mobility   Additional Comments OOB upon presentation and at end fo session   Transfers   Sit to Stand 4  Minimal assistance   Additional items Assist x 1; Increased time required;Verbal cues   Stand to Sit 3  Moderate assistance   Additional items Assist x 1; Increased time required;Verbal cues   Stand pivot 3  Moderate assistance   Additional items Assist x 1; Increased time required;Verbal cues   Toilet transfer 3  Moderate assistance   Additional items Assist x 1; Increased time required;Verbal cues   Additional Comments RW level, increased VC and TC required, pt with LOB x 3 and assist to correct   Cognition   Overall Cognitive Status Impaired   Arousal/Participation Alert   Attention Attends with cues to redirect   Orientation Level Oriented X4   Memory Decreased recall of precautions   Following Commands Follows one step commands with increased time or repetition   Comments REcalls 2/3 THP this date, increased VC requried for safety and carryover of techqnieus   Activity Tolerance   Activity Tolerance Patient limited by fatigue   Medical Staff Made Aware NSG aware   Assessment   Assessment Pt was seen this date for OT Tx session focusign on self care tasks, sit to stand progression, trafners, toietling, review of THP, education on LHAE and fall prevention and overall activity toelrance  Pt presents seated OOB in chair, comeptles previsouly emntieond tasks at docuemtned assist levels, see above  Pt requires increased time and VC for safety and carryover throguhout  Pt with multiple LOB during stand pivot tranfer tasks due to improper weight shift requires increased assist to correct  Pt resting inchair at end of session with all needs i nreach  Contine to reinforce practice and educaiton with Dipti Escalera  Would beenfit from continued OT tx to improve overall funciotnal abitles  Contineu to follow [facility] current POC     Plan   Treatment Interventions ADL retraining   Goal Expiration Date 01/20/20   OT Treatment Day 1   OT Frequency 3-5x/wk   Recommendation   OT Discharge Recommendation Short Term [address] [facility], [place]

## 2020-01-07 NOTE — ASSESSMENT & PLAN NOTE
· Home regimen consists of 400 mg Seroquel at bedtime, Geodon 80 mg BID and Ativan p r n  · Patient was transferred to ICU for suspected NMS  This has been treated  · Appreciate psychiatry input  Will plan to resume Seroquel at this time starting at 50 mg qhs, and increase dose by 50 mg every 2 days until back on 400 mg dosing  Do not resume Geodon or Paxil on discharge      · Seroquel resumed on 1/7/2020

## 2020-01-07 NOTE — ASSESSMENT & PLAN NOTE
· Was transferred to ICU for suspected NMS and treated for this, this occurred post-operatively with rigidity, tremors, hyperthermia, tachycardia, delirium   · Neurology, toxicology, psychiatry following  · Geodon has been discontinued, do not resume on discharge  Seroquel - resumed today 1/7 at 50 mg qhs, can increase dose every 2 days by 50 mg until resume home dosing of 400 mg qhs   · This is now improving/resolved    Continue to taper Valium - decrease dose to 5 mg BID today, consider further decreasing tomorrow  · Would strongly recommend that if patient would require any other procedure with anesthesia to make them aware of this - d/w patient

## 2020-01-07 NOTE — ASSESSMENT & PLAN NOTE
· Pt with urinary retention, suspect in setting of post-operative state/ambulatory dysfunction  · Bernardo placed 1/6/2020  · Consider urology eval

## 2020-01-07 NOTE — UTILIZATION REVIEW
Continued Stay Review    Date: 1/7                          Current Patient Class: Inpatient Current Level of Care: Med Surg    HPI:59 y o  female initially admitted on 12/29 presents with ambulatory dysfunction and right hip pain  She had a recent history of right total hip arthroplasty on December 11th and was discharged on December 20th 2 inpatient rehab    Assessment/Plan: 1/2 S/P REVISION TOTAL HIP ARTHROPLASTY [facility] [address] ([address])  Right hip fracture - Pain control   Urinary retention - suspect in setting of post-operative state/ambulatory dysfunction  Bernardo cath placed 1/6  Consider Urology consult  Rhabdomyolysis - improving  Schizoaffective - Home regimen consists of 400 mg Seroquel at bedtime, Geodon 80 mg BID and Ativan p r n  Patient was transferred to ICU for suspected NMS and resolved  Plan to resume Seroquel at this time starting at 50 mg qhs, and increase dose by 50 mg every 2 days until back on 400 mg dosing  Do not resume Geodon or Paxil on discharge  Seroquel resumed on 1/7/2020   Damien Hilda patient will continue to require additional inpatient hospital stay due to monitor Hgb, weaning valium     Pertinent Labs/Diagnostic Results:   Results from last 7 days   Lab Units 01/07/20  0509 01/06/20  1325 01/05/20  0916 01/05/20  0527 01/04/20  1424  01/02/20  0523   WBC Thousand/uL 4 93 6 96 7 90 6 35  --    < > 3 38*   HEMOGLOBIN g/dL 8 0* 8 5* 7 7* 6 7* 7 6*   < > 11 6   I STAT HEMOGLOBIN   --   --   --   --   --    < >  --    HEMATOCRIT % 25 0* 26 7* 23 5* 20 6* 22 3*   < > 36 3   HEMATOCRIT, ISTAT   --   --   --   --   --    < >  --    PLATELETS Thousands/uL 272 299 236 228  --    < > 367   NEUTROS ABS Thousands/µL  --   --  6 34 5 05  --   --  2 06    < > = values in this interval not displayed       Results from last 7 days   Lab Units 01/05/20  0916   RETIC CT ABS  113,200*   RETIC CT PCT % 4 24*     Results from last 7 days   Lab Units 01/07/20  0509 01/05/20  0527 01/04/20  2039 01/04/20  1424 01/04/20  0450 01/04/20  0036  01/02/20  1756 01/02/20  1700 01/02/20  1604 01/02/20  1553   SODIUM mmol/L 136 135* 135* 136 134* 135*   < >  --   --   --   --    POTASSIUM mmol/L 3 8 3 5 3 4* 3 3* 3 2* 3 2*   < >  --   --   --   --    CHLORIDE mmol/L 101 102 100 100 101 103   < >  --   --   --   --    CO2 mmol/L 31 29 30 30 28 25   < >  --   --   --   --    CO2, I-STAT mmol/L  --   --   --   --   --   --   --  27 28 28 20*   ANION GAP mmol/L 4 4 5 6 5 7   < >  --   --   --   --    BUN mg/dL 5 4* 5 4* 6 7   < >  --   --   --   --    CREATININE mg/dL 0 47* 0 35* 0 42* 0 52* 0 46* 0 52*   < >  --   --   --   --    EGFR ml/min/1 73sq m 108 120 113 105 109 105   < >  --   --   --   --    CALCIUM mg/dL 8 7 8 4 8 2* 8 5 8 1* 8 3   < >  --   --   --   --    CALCIUM, IONIZED, ISTAT mmol/L  --   --   --   --   --   --   --  1 10* 1 12 1 12 0 94*   MAGNESIUM mg/dL  --  1 8  --   --   --  1 6  --   --   --   --   --    PHOSPHORUS mg/dL  --  2 7  --   --   --  2 6*  --   --   --   --   --     < > = values in this interval not displayed       Results from last 7 days   Lab Units 01/07/20  0509 01/05/20  0527 01/04/20  0450 01/03/20  1524   AST U/L 88* 196* 338* 218*   ALT U/L 84* 96* 101* 57   ALK PHOS U/L 144* 117* 145* 146*   TOTAL PROTEIN g/dL 5 4* 4 8* 5 3* 5 5*   ALBUMIN g/dL 2 0* 2 0* 2 5* 2 8*   TOTAL BILIRUBIN mg/dL 0 32 0 28 0 44 0 72   BILIRUBIN DIRECT mg/dL  --  0 10  --  0 21*     Results from last 7 days   Lab Units 01/05/20  1157 01/05/20  0546 01/04/20  2358 01/04/20  1719 01/04/20  1138 01/04/20  0028 01/03/20  1715 01/03/20  1544 01/02/20  1929   POC GLUCOSE mg/dl 96 108 117 113 119 122 102 104 162*     Results from last 7 days   Lab Units 01/07/20  0509 01/05/20  0527 01/04/20  2039 01/04/20  1424 01/04/20  0450 01/04/20  0036 01/03/20  0454 01/02/20  2230 01/02/20  0523 01/01/20  0542   GLUCOSE RANDOM mg/dL 86 101 114 102 124 120 144* 159* 84 77       Results from last 7 days   Lab Units 01/04/20  0041 01/02/20  2230   PH ART  7 536* 7 523*   PCO2 ART mm Hg 28 2* 28 8*   PO2 ART mm Hg 106 7 112 1   HCO3 ART mmol/L 23 4 23 1   BASE EXC ART mmol/L 1 1 0 9   O2 CONTENT ART mL/dL 11 4* 14 2*   O2 HGB, ARTERIAL % 97 5* 97 4*   ABG SOURCE  Artery Radial, Left         Results from last 7 days   Lab Units 01/02/20  1756 01/02/20  1700 01/02/20  1604   I STAT BASE EXC mmol/L 1 2 2   I STAT O2 SAT % 100* 100* 100*   ISTAT PH ART  7 414 7 432 7 433   I STAT ART PCO2 mm HG 40 4 39 6 39 7   I STAT ART PO2 mm  0* 227 0* 209 0*   I STAT ART HCO3 mmol/L 25 9 26 4 26 5     Results from last 7 days   Lab Units 01/07/20  0509 01/05/20  0527 01/04/20  2039   CK TOTAL U/L 1,020* 4,297* 6,734*   CK MB INDEX % <1 0 <1 0 <1 0   CK MB ng/mL 4 7 14 4* 24 0*     Results from last 7 days   Lab Units 01/03/20  0328   TROPONIN I ng/mL <0 02     Results from last 7 days   Lab Units 01/02/20  2007   D-DIMER QUANTITATIVE ug/ml FEU 2 78*     Results from last 7 days   Lab Units 01/02/20  0523   PROTIME seconds 12 9   INR  1 01   PTT seconds 34     Results from last 7 days   Lab Units 01/03/20  2026   TSH 3RD GENERATON uIU/mL 0 350*     Results from last 7 days   Lab Units 01/03/20  0328   PROCALCITONIN ng/ml <0 05     Results from last 7 days   Lab Units 01/04/20  0036 01/03/20  1010 01/03/20  0738 01/03/20  0336   LACTIC ACID mmol/L 1 5 1 8 3 7* 3 4*     Results from last 7 days   Lab Units 01/04/20  0040   CLARITY UA  Clear   COLOR UA  Yellow   SPEC GRAV UA  1 011   PH UA  7 5   GLUCOSE UA mg/dl Negative   KETONES UA mg/dl Negative   BLOOD UA  Negative   PROTEIN UA mg/dl Negative   NITRITE UA  Negative   BILIRUBIN UA  Negative   UROBILINOGEN UA E U /dl 0 2   LEUKOCYTES UA  Negative   WBC UA /hpf None Seen   RBC UA /hpf None Seen   BACTERIA UA /hpf None Seen   EPITHELIAL CELLS WET PREP /hpf None Seen     Results from last 7 days   Lab Units 01/03/20  0336 01/03/20  0335   BLOOD CULTURE  No Growth After 4 Days  No Growth After 4 Days  Vital Signs:   01/07/20 07:19:35  98 1 °F (36 7 °C)  81  12  124/79  94  97 %       01/07/20 05:25:15  97 9 °F (36 6 °C)  79    147/91  110  99 %       01/07/20 0525      20               Medications:   Scheduled Medications:  Medications:  acetaminophen 975 mg Oral Q8H [address]   calcium carbonate 1 tablet Oral BID With Meals   cholecalciferol 2,000 Units Oral Daily   diazepam 5 mg Intravenous Q12H   docusate sodium 100 mg Oral BID   enoxaparin 40 mg Subcutaneous U31X YUMIKO   folic acid 1 mg Oral Daily   gabapentin 100 mg Oral TID   iron sucrose 300 mg Intravenous Every Other Day   levothyroxine 25 mcg Oral Early Morning   lidocaine 2 patch Topical Daily   methocarbamol 750 mg Oral Q6H [address]   multivitamin-minerals 1 tablet Oral Daily   pantoprazole 40 mg Oral BID AC   polyethylene glycol 17 g Oral Daily   QUEtiapine 50 mg Oral HS     Continuous IV Infusions:     PRN Meds:  aluminum-magnesium hydroxide-simethicone 30 mL Oral Q4H PRN   calcium carbonate 500 mg Oral Daily PRN   LORazepam 2 mg Oral Q8H PRN   oxyCODONE 5 mg Oral Q4H PRN       Discharge Plan: TBD    Network Utilization Review Department  [email or web address]  ATTENTION: Please call with any questions or concerns to [phone] and carefully listen to the prompts so that you are directed to the right person  All voicemails are confidential   Celia Estrada all requests for admission clinical reviews, approved or denied determinations and any other requests to dedicated fax number below belonging to the campus where the patient is receiving treatment   List of dedicated fax numbers for the Facilities:  FACILITY NAME UR FAX NUMBER   ADMISSION DENIALS (Administrative/Medical Necessity) [phone]   [address]  (Maternity/NICU/Pediatrics) [phone]   Juli Flaherty [address] [facility] Zach Álvarez [phone]   [address] [facility] [address] [facility] [phone]   [address] [phone]   [address] [facility], [facility]  [phone] [address] [phone]

## 2020-01-07 NOTE — PLAN OF CARE
Problem: OCCUPATIONAL THERAPY ADULT  Goal: Performs self-care activities at highest level of function for planned discharge setting  See evaluation for individualized goals  Description  Treatment Interventions: ADL retraining, Functional transfer training, Endurance training, Patient/family training, Equipment evaluation/education, Compensatory technique education, Activityengagement          See flowsheet documentation for full assessment, interventions and recommendations  Outcome: Progressing  Note:   Limitation: Decreased ADL status, Decreased cognition, Decreased self-care trans, Decreased high-level ADLs  Prognosis: Fair  Assessment: Pt was seen this date for OT Tx session focusign on self care tasks, sit to stand progression, trafners, toietling, review of THP, education on LHAE and fall prevention and overall activity toelrance  Pt presents seated OOB in chair, comeptles previsouly emntieond tasks at docuemtned assist levels, see above  Pt requires increased time and VC for safety and carryover throguhout  Pt with multiple LOB during stand pivot tranfer tasks due to improper weight shift requires increased assist to correct  Pt resting inchair at end of session with all needs i nreach  Contine to reinforce practice and educaiton with Tae Reynolds  Would rockyfit from continued OT tx to improve overall funciotnal abitles  Contineu to follow wtih current POC       OT Discharge Recommendation: Short Term Rehab  OT - OK to Discharge: Yes(When medically appropriate to rehab) 18 YO male stable postop ligation of  shunt

## 2020-01-07 NOTE — PROGRESS NOTES
Progress Note - Orthopedics   Keny Age 61 y o  female MRN: [id number]  Unit/Bed#: EDISON -01      Subjective:    61 y  o female POD 5 R rev ELIZABETH  Doing well, would like her night time seroquel restarted       Labs:  0   Lab Value Date/Time    HCT 25 0 (L) 01/07/2020 0509    HCT 26 7 (L) 01/06/2020 1325    HCT 23 5 (L) 01/05/2020 0916    HGB 8 0 (L) 01/07/2020 0509    HGB 8 5 (L) 01/06/2020 1325    HGB 7 7 (L) 01/05/2020 0916    INR 1 01 01/02/2020 0523    WBC 4 93 01/07/2020 0509    WBC 6 96 01/06/2020 1325    WBC 7 90 01/05/2020 0916    CRP 4 8 (H) 12/04/2019 1004       Meds:    Current Facility-Administered Medications:     acetaminophen (TYLENOL) tablet 975 mg, 975 mg, Oral, Q8H [address], Jose Manuel Baird PA-C, 975 mg at 01/07/20 0516    aluminum-magnesium hydroxide-simethicone (MYLANTA) 200-200-20 mg/5 mL oral suspension 30 mL, 30 mL, Oral, Q4H PRN, Jose Manuel Baird PA-C    calcium carbonate (OYSTER SHELL,OSCAL) 500 mg tablet 1 tablet, 1 tablet, Oral, BID With Meals, Jose Manuel Baird PA-C, 1 tablet at 01/06/20 1547    calcium carbonate (TUMS) chewable tablet 500 mg, 500 mg, Oral, Daily PRN, LINDA Yarbrough-C, 500 mg at 01/04/20 0831    cholecalciferol (VITAMIN D3) tablet 2,000 Units, 2,000 Units, Oral, Daily, Jose Manuel Baird PA-C, 2,000 Units at 01/06/20 0836    diazepam (VALIUM) injection 10 mg, 10 mg, Intravenous, Q12H, Yareli Lewis PA-C, 10 mg at 01/06/20 2205    docusate sodium (COLACE) capsule 100 mg, 100 mg, Oral, BID, Jose Manuel Baird PA-C, 100 mg at 01/06/20 0836    enoxaparin (LOVENOX) subcutaneous injection 40 mg, 40 mg, Subcutaneous, Q24H [facility], Connie Gloria PA-C, 40 mg at 16/21/05 8479    folic acid (FOLVITE) tablet 1 mg, 1 mg, Oral, Daily, Jose Manuel Baird PA-C, 1 mg at 01/06/20 8517    gabapentin (NEURONTIN) capsule 100 mg, 100 mg, Oral, TID, Jose Manuel Baird PA-C, 100 mg at 01/06/20 2205    iron sucrose (VENOFER) 300 mg in sodium chloride 0 9 % 250 mL IVPB, 300 mg, Intravenous, Every Other Day, Waleska Brito DO, 300 mg at 01/05/20 0900    levothyroxine tablet 25 mcg, 25 mcg, Oral, Early Morning, Frank Vaughn, PA-C, 25 mcg at 01/07/20 0516    lidocaine (LIDODERM) 5 % patch 2 patch, 2 patch, Topical, Daily, Frank Vaughn, PA-C, 2 patch at 01/06/20 0836    LORazepam (ATIVAN) tablet 2 mg, 2 mg, Oral, Q8H PRN, Frank Vaughn, PA-C, 2 mg at 01/04/20 1611    methocarbamol (ROBAXIN) tablet 750 mg, 750 mg, Oral, Q6H [facility], Frank Vaughn PA-C, 750 mg at 01/07/20 0516    multivitamin-minerals (CENTRUM) tablet 1 tablet, 1 tablet, Oral, Daily, Frank Vaughn, PA-C, 1 tablet at 01/06/20 0836    oxyCODONE (ROXICODONE) oral solution 5 mg, 5 mg, Oral, Q4H PRN, Clyde Granado PA-C, 5 mg at 01/06/20 1547    pantoprazole (PROTONIX) EC tablet 40 mg, 40 mg, Oral, BID AC, Frank Vaughn PA-C, 40 mg at 01/07/20 0515    polyethylene glycol (MIRALAX) packet 17 g, 17 g, Oral, Daily, LINDA Menendez-C, 17 g at 01/06/20 2634    Blood Culture:   Lab Results   Component Value Date    BLOODCX No Growth at 72 hrs  01/03/2020       Wound Culture:   No results found for: WOUNDCULT    Ins and Outs:  I/O last 24 hours: In: 0487 [P O :3720]  Out: 8717 [Urine:8717]          Physical:  Vitals:    01/07/20 0525   BP: 147/91   Pulse: 79   Resp:    Temp: 97 9 °F (36 6 °C)   SpO2: 99%     Musculoskeletal: right Lower Extremity  · Dressing c/d/i  · TTP around incision  · SILT s/s/sp/dp/t    · +fhl/ehl, +ankle dorsi/plantar flexion  · 2+ DP pulse    Assessment:    61 y  o female POD 5 R rev ELIZABETH  Doing well        Plan:  · WBAT RLE  · Greater than 2 gram drop which qualifies for diagnosis of acute blood loss anemia, requiring transfusion  · PT/OT  · Pain control  · DVT ppx  · Dispo: Ortho will follow    Maria Eugenia Mayen MD

## 2020-01-07 NOTE — PLAN OF CARE
Problem: PHYSICAL THERAPY ADULT  Goal: Performs mobility at highest level of function for planned discharge setting  See evaluation for individualized goals  Outcome: Progressing  Note:   Prognosis: Good  Problem List: Decreased strength, Decreased endurance, Impaired balance, Decreased mobility, Orthopedic restrictions, Pain  Assessment: Pt seen for PT treatment session  Pt pleasant and motivated to participate  Pt able to recall 2/3 hip precautions, third with cues, which is improvement from last session  Pt requires Min A x 2 for supine>sit and STS  Pt demonstrated ability to ambulate increased distance 12 ft x 2 with Min A x 2 and assist of third for chair follow  Pt appears to be more able to tolerated WBing through R LE  Pt performed LE therex as outlined above  Pt left sitting upright in bedside chair with all needs in reach  Pt will benefit from skilled therapy in order to address current impairments and functional limitations  PT to follow pt and recommending rehab once medically cleared  Barriers to Discharge: Decreased caregiver support, Inaccessible home environment     Recommendation: Post acute IP rehab     PT - OK to Discharge: Yes(to rehab once medically cleared)    See flowsheet documentation for full assessment

## 2020-01-08 PROBLEM — R00.0 TACHYCARDIA: Status: RESOLVED | Noted: 2018-08-13 | Resolved: 2020-01-08

## 2020-01-08 PROBLEM — R06.02 SOB (SHORTNESS OF BREATH): Status: RESOLVED | Noted: 2020-01-02 | Resolved: 2020-01-08

## 2020-01-08 LAB
ANION GAP SERPL CALCULATED.3IONS-SCNC: 5 MMOL/L (ref 4–13)
BACTERIA BLD CULT: NORMAL
BACTERIA BLD CULT: NORMAL
BUN SERPL-MCNC: 5 MG/DL (ref 5–25)
CALCIUM SERPL-MCNC: 9.1 MG/DL (ref 8.3–10.1)
CHLORIDE SERPL-SCNC: 105 MMOL/L (ref 100–108)
CO2 SERPL-SCNC: 30 MMOL/L (ref 21–32)
CREAT SERPL-MCNC: 0.42 MG/DL (ref 0.6–1.3)
ERYTHROCYTE [DISTWIDTH] IN BLOOD BY AUTOMATED COUNT: 18.6 % (ref 11.6–15.1)
GFR SERPL CREATININE-BSD FRML MDRD: 113 ML/MIN/1.73SQ M
GLUCOSE SERPL-MCNC: 91 MG/DL (ref 65–140)
HCT VFR BLD AUTO: 25 % (ref 34.8–46.1)
HGB BLD-MCNC: 8 G/DL (ref 11.5–15.4)
MCH RBC QN AUTO: 29.2 PG (ref 26.8–34.3)
MCHC RBC AUTO-ENTMCNC: 32 G/DL (ref 31.4–37.4)
MCV RBC AUTO: 91 FL (ref 82–98)
PLATELET # BLD AUTO: 315 THOUSANDS/UL (ref 149–390)
PMV BLD AUTO: 9.2 FL (ref 8.9–12.7)
POTASSIUM SERPL-SCNC: 3.7 MMOL/L (ref 3.5–5.3)
RBC # BLD AUTO: 2.74 MILLION/UL (ref 3.81–5.12)
SODIUM SERPL-SCNC: 140 MMOL/L (ref 136–145)
WBC # BLD AUTO: 5.17 THOUSAND/UL (ref 4.31–10.16)

## 2020-01-08 PROCEDURE — NC001 PR NO CHARGE: Performed by: PSYCHIATRY & NEUROLOGY

## 2020-01-08 PROCEDURE — 85027 COMPLETE CBC AUTOMATED: CPT | Performed by: PHYSICIAN ASSISTANT

## 2020-01-08 PROCEDURE — 99232 SBSQ HOSP IP/OBS MODERATE 35: CPT | Performed by: PSYCHIATRY & NEUROLOGY

## 2020-01-08 PROCEDURE — 99232 SBSQ HOSP IP/OBS MODERATE 35: CPT | Performed by: INTERNAL MEDICINE

## 2020-01-08 PROCEDURE — 80048 BASIC METABOLIC PNL TOTAL CA: CPT | Performed by: PHYSICIAN ASSISTANT

## 2020-01-08 RX ORDER — QUETIAPINE FUMARATE 100 MG/1
100 TABLET, FILM COATED ORAL
Status: DISCONTINUED | OUTPATIENT
Start: 2020-01-08 | End: 2020-01-14 | Stop reason: HOSPADM

## 2020-01-08 RX ORDER — DIAZEPAM 5 MG/ML
2.5 INJECTION, SOLUTION INTRAMUSCULAR; INTRAVENOUS EVERY 12 HOURS
Status: ACTIVE | OUTPATIENT
Start: 2020-01-08 | End: 2020-01-10

## 2020-01-08 RX ORDER — SENNOSIDES 8.6 MG
2 TABLET ORAL
Status: DISCONTINUED | OUTPATIENT
Start: 2020-01-08 | End: 2020-01-09

## 2020-01-08 RX ORDER — PAROXETINE 10 MG/1
10 TABLET, FILM COATED ORAL DAILY
Status: DISCONTINUED | OUTPATIENT
Start: 2020-01-08 | End: 2020-01-14 | Stop reason: HOSPADM

## 2020-01-08 RX ADMIN — PANTOPRAZOLE SODIUM 40 MG: 20 TABLET, DELAYED RELEASE ORAL at 05:09

## 2020-01-08 RX ADMIN — OXYCODONE HYDROCHLORIDE 5 MG: 5 SOLUTION ORAL at 20:20

## 2020-01-08 RX ADMIN — DOCUSATE SODIUM 100 MG: 100 CAPSULE, LIQUID FILLED ORAL at 09:58

## 2020-01-08 RX ADMIN — METHOCARBAMOL 750 MG: 750 TABLET, FILM COATED ORAL at 17:23

## 2020-01-08 RX ADMIN — Medication 1 TABLET: at 10:10

## 2020-01-08 RX ADMIN — SENNOSIDES 17.2 MG: 8.6 TABLET, FILM COATED ORAL at 22:04

## 2020-01-08 RX ADMIN — GABAPENTIN 100 MG: 100 CAPSULE ORAL at 16:27

## 2020-01-08 RX ADMIN — ACETAMINOPHEN 975 MG: 325 TABLET ORAL at 14:18

## 2020-01-08 RX ADMIN — GABAPENTIN 100 MG: 100 CAPSULE ORAL at 22:04

## 2020-01-08 RX ADMIN — ENOXAPARIN SODIUM 40 MG: 40 INJECTION SUBCUTANEOUS at 12:43

## 2020-01-08 RX ADMIN — ACETAMINOPHEN 975 MG: 325 TABLET ORAL at 22:04

## 2020-01-08 RX ADMIN — LEVOTHYROXINE SODIUM 25 MCG: 25 TABLET ORAL at 22:04

## 2020-01-08 RX ADMIN — PANTOPRAZOLE SODIUM 40 MG: 20 TABLET, DELAYED RELEASE ORAL at 16:27

## 2020-01-08 RX ADMIN — CALCIUM 1 TABLET: 500 TABLET ORAL at 08:28

## 2020-01-08 RX ADMIN — FOLIC ACID 1 MG: 1 TABLET ORAL at 09:58

## 2020-01-08 RX ADMIN — PAROXETINE 10 MG: 20 TABLET, FILM COATED ORAL at 14:18

## 2020-01-08 RX ADMIN — POLYETHYLENE GLYCOL 3350 34 G: 17 POWDER, FOR SOLUTION ORAL at 09:58

## 2020-01-08 RX ADMIN — LIDOCAINE 2 PATCH: 50 PATCH CUTANEOUS at 10:08

## 2020-01-08 RX ADMIN — METHOCARBAMOL 750 MG: 750 TABLET, FILM COATED ORAL at 12:43

## 2020-01-08 RX ADMIN — MELATONIN 2000 UNITS: at 09:57

## 2020-01-08 RX ADMIN — ACETAMINOPHEN 975 MG: 325 TABLET ORAL at 05:09

## 2020-01-08 RX ADMIN — QUETIAPINE FUMARATE 100 MG: 100 TABLET ORAL at 22:04

## 2020-01-08 RX ADMIN — METHOCARBAMOL 750 MG: 750 TABLET, FILM COATED ORAL at 05:09

## 2020-01-08 RX ADMIN — CALCIUM 1 TABLET: 500 TABLET ORAL at 16:27

## 2020-01-08 RX ADMIN — GABAPENTIN 100 MG: 100 CAPSULE ORAL at 09:58

## 2020-01-08 RX ADMIN — LORAZEPAM 2 MG: 1 TABLET ORAL at 10:10

## 2020-01-08 NOTE — ASSESSMENT & PLAN NOTE
· Was transferred to ICU for suspected NMS and treated for this, this occurred post-operatively with rigidity, tremors, hyperthermia, tachycardia, delirium   · Neurology, toxicology, psychiatry following  · Geodon has been discontinued, do not resume on discharge  · Seroquel increased today  · This is now improving/resolved     · Continue to taper Valium - decrease dose to 25 mg BID for 2 days then stop  · Paxil restarted by Psychiatry

## 2020-01-08 NOTE — ASSESSMENT & PLAN NOTE
· Home regimen consists of 400 mg Seroquel at bedtime, Geodon 80 mg BID and Ativan p r n  · Patient was transferred to ICU for suspected NMS  This has been treated  · Appreciate psychiatry input  Will plan to resume Seroquel at this time starting at 50 mg qhs, and increase dose by 50 mg every 2 days until back on 400 mg dosing    Paxil restarted by Psychiatry  · Seroquel resumed on 1/7/2020

## 2020-01-08 NOTE — RESTORATIVE TECHNICIAN NOTE
Restorative Specialist Mobility Note       Activity: Ambulate in solorio, Ambulate in room, Bathroom privileges, Chair, Dangle, Stand at bedside(Educated/encouraged pt to ambulate with assistance 3-4 x's/day  Chair alarm on   Pt callbell, phone/tray within reach )     Assistive Device: Front wheel walker, Other (Comment)(Assist x1 with chair follow )       Jarad DACOSTA, Restorative Technician, [employer]

## 2020-01-08 NOTE — PROGRESS NOTES
Progress Note - Behavioral Health   Olga Arciniega 61 y o  female MRN: [id number]  Unit/Bed#: -16 Encounter: [id number]        I came to see the patient continuation of care as a request of primary team because patient states that she is feeling depressed  When I saw the patient she states that she is feeling depressed and she claimed that she was taking the Paxil when she was home  She also states that she was taking 4 mg of Ativan in the morning instead of taking 2 mg twice a day  She states that she worries about her future  She denies any other issues  Behavior over the last 24 hours:  improved  Sleep: normal  Appetite: normal  Medication side effects: No  ROS: Difficulty ambulating    Mental Status Evaluation:  Appearance:  age appropriate   Behavior:  normal   Speech:  normal pitch and normal volume   Mood:  depressed   Affect:  mood-congruent   Language: naming objects and repeating phrases   Thought Process:  goal directed   Associations: intact associations   Thought Content:  normal   Perceptual Disturbances: None   Risk Potential: She denies any suicidal or homicidal ideation plan or intent   Sensorium:  person, place, time/date, situation, day of week and month of year   Memory:  recent and remote memory grossly intact   Cognition:  grossly intact   Consciousness:  alert and awake    Attention: attention span and concentration were age appropriate   Intellect: within normal limits   Fund of Knowledge: awareness of current events: Good, past history: Good and vocabulary: Good   Insight:  fair   Judgment: fair   Muscle Strength and Tone: Within normal limits   Gait/Station: Difficulty ambulating   Motor Activity: no abnormal movements         Assessment/Plan  Olga Arciniega is a 61 y o  female with schizoaffective disorder and generalized anxiety disorder who had been in the hospital with ambulatory dysfunction    She was complicated with neurolytic malignant syndrome and her medications once discontinue, patient states that she is feeling very anxious and depressed because she need to go to inpatient rehabilitation and depressed that she wants to go does not have any bed for her  Patient denies any suicidal thoughts plans or intent, patient denies any psychotic symptoms  Diagnosis:  Schizoaffective disorder depressed type  Generalized anxiety disorder  Recommended Treatment:   Continue medical management  Increase Seroquel 100 mg p o  HS for 2 days and then 150 and 50 mg every other day and maximum 400 mg p o  HS  Start Paxil 10 mg p o  Daily  Ativan 2 mg p o  Q 8 hours p r n  For anxiety, according to the PA PMED she has been taking this medication for a long time  I discussed with primary team who agree with treatment plan  I will follow up p r n    Medications:   current meds:   Current Facility-Administered Medications   Medication Dose Route Frequency    acetaminophen (TYLENOL) tablet 975 mg  975 mg Oral Q8H [address]    aluminum-magnesium hydroxide-simethicone (MYLANTA) 200-200-20 mg/5 mL oral suspension 30 mL  30 mL Oral Q4H PRN    calcium carbonate (OYSTER SHELL,OSCAL) 500 mg tablet 1 tablet  1 tablet Oral BID With Meals    calcium carbonate (TUMS) chewable tablet 500 mg  500 mg Oral Daily PRN    cholecalciferol (VITAMIN D3) tablet 2,000 Units  2,000 Units Oral Daily    diazepam (VALIUM) injection 2 5 mg  2 5 mg Intravenous Q12H    enoxaparin (LOVENOX) subcutaneous injection 40 mg  40 mg Subcutaneous [id number] YUMIKO    folic acid (FOLVITE) tablet 1 mg  1 mg Oral Daily    gabapentin (NEURONTIN) capsule 100 mg  100 mg Oral TID    iron sucrose (VENOFER) 300 mg in sodium chloride 0 9 % 250 mL IVPB  300 mg Intravenous Every Other Day    levothyroxine tablet 25 mcg  25 mcg Oral HS    lidocaine (LIDODERM) 5 % patch 2 patch  2 patch Topical Daily    LORazepam (ATIVAN) tablet 2 mg  2 mg Oral Q8H PRN    methocarbamol (ROBAXIN) tablet 750 mg  750 mg Oral Q6H [address]    multivitamin-minerals (CENTRUM) tablet 1 tablet  1 tablet Oral Daily    oxyCODONE (ROXICODONE) oral solution 5 mg  5 mg Oral Q4H PRN    pantoprazole (PROTONIX) EC tablet 40 mg  40 mg Oral BID AC    PARoxetine (PAXIL) tablet 10 mg  10 mg Oral Daily    polyethylene glycol (MIRALAX) packet 34 g  34 g Oral Daily    QUEtiapine (SEROquel) tablet 100 mg  100 mg Oral HS    senna (SENOKOT) tablet 17 2 mg  2 tablet Oral HS         Risks, benefits and possible side effects of Medications:     Risks, benefits, and possible side effects of medications explained to patient and patient verbalizes understanding  Labs: I have personally reviewed all pertinent laboratory results  I have personally reviewed all pertinent laboratory/tests results    Labs in last 72 hours:   Recent Labs     01/07/20  0509 01/08/20  0521   WBC 4 93 5 17   RBC 2 74* 2 74*   HGB 8 0* 8 0*   HCT 25 0* 25 0*    315   RDW 18 1* 18 6*   SODIUM 136 140   K 3 8 3 7    105   CO2 31 30   BUN 5 5   CREATININE 0 47* 0 42*   GLUC 86 91   CALCIUM 8 7 9 1   AST 88*  --    ALT 84*  --    ALKPHOS 144*  --    TP 5 4*  --    ALB 2 0*  --    TBILI 0 32  --          Bhakti Jernigan MD

## 2020-01-08 NOTE — ASSESSMENT & PLAN NOTE
· Pt with urinary retention, suspect in setting of post-operative state/ambulatory dysfunction  · Bernardo placed 1/6/2020  · Voiding trial tomorrow, if unable to void will consult with Urology

## 2020-01-08 NOTE — PROGRESS NOTES
Progress Note - Michelle Stokes 1960, 61 y o  female MRN: [id number]    Unit/Bed#: -01 Encounter: [id number]    Primary Care Provider: Wilda Pedersen MD   Date and time admitted to hospital: 12/27/2019 10:59 AM        * Closed right hip fracture [facility])  Assessment & Plan  · S/p OR with orthopedics on 1/2   · CT femur R w/o contrast (12/31/19): Obliquely oriented almost nondisplaced proximal femoral shaft fracture around the site of the femoral stem without significant displacement of the hardware  Moderate amount of soft tissue calcification in the vicinity of the injury  Diminished density of the vastus intermedius muscle probably representing some muscular edema in the deep compartment  No dislocation at the hip  · Pain control PRN  · PT/OT evaluation appreciated will need rehab   · Continue Gabapentin 100 mg TID    Urinary retention  Assessment & Plan  · Pt with urinary retention, suspect in setting of post-operative state/ambulatory dysfunction  · Bernardo placed 1/6/2020  · Voiding trial tomorrow, if unable to void will consult with Urology    NMS (neuroleptic malignant syndrome)  Assessment & Plan  · Was transferred to ICU for suspected NMS and treated for this, this occurred post-operatively with rigidity, tremors, hyperthermia, tachycardia, delirium   · Neurology, toxicology, psychiatry following  · Geodon has been discontinued, do not resume on discharge  · Seroquel increased today  · This is now improving/resolved  · Continue to taper Valium - decrease dose to 25 mg BID for 2 days then stop  · Paxil restarted by Psychiatry    Iron deficiency anemia secondary to inadequate dietary iron intake  Assessment & Plan  · Continue iron supplementation  · Hgb has been stable   · Monitor CBC     Schizoaffective disorder, depressive type (HCC)  Assessment & Plan  · Home regimen consists of 400 mg Seroquel at bedtime, Geodon 80 mg BID and Ativan p r n    · Patient was transferred to ICU for suspected NMS  This has been treated  · Appreciate psychiatry input  Will plan to resume Seroquel at this time starting at 50 mg qhs, and increase dose by 50 mg every 2 days until back on 400 mg dosing  Paxil restarted by Psychiatry  · Seroquel resumed on 2020     Non-traumatic rhabdomyolysis  Assessment & Plan  · Suspected 2/2 NMS   · Improving   · Continue to monitor CPK periodically    Acquired hypothyroidism  Assessment & Plan  · Continue daily levothyroxine    Localized osteoporosis with current pathological fracture with routine healing  Assessment & Plan  · Vitamin-D supplementation  · Outpatient endocrine follow-up    Chronic hyponatremia  Assessment & Plan  · Psychiatric meds likely contribute to hyponatremia  · This has resolved        VTE Pharmacologic Prophylaxis:   Pharmacologic: Heparin  Mechanical VTE Prophylaxis in Place: Yes    Patient Centered Rounds: I have performed bedside rounds with nursing staff today  Education and Discussions with Family / Patient:  Patient, plan of care    Time Spent for Care: 20 minutes  More than 50% of total time spent on counseling and coordination of care as described above  Current Length of Stay: 10 day(s)    Current Patient Status: Inpatient   Certification Statement: The patient will continue to require additional inpatient hospital stay due to Discharge planning    Discharge Plan:  SNF    Code Status: Level 1 - Full Code      Subjective:   Denies any acute complaints except for worsening depression, requesting to speak with psychiatrist today  Objective:     Vitals:   Temp (24hrs), Av °F (36 7 °C), Min:97 7 °F (36 5 °C), Max:98 4 °F (36 9 °C)    Temp:  [97 7 °F (36 5 °C)-98 4 °F (36 9 °C)] 98 1 °F (36 7 °C)  HR:  [78-94] 94  Resp:  [12-20] 18  BP: (103-142)/(58-88) 123/81  SpO2:  [95 %-100 %] 100 %  Body mass index is 29 72 kg/m²  Input and Output Summary (last 24 hours):        Intake/Output Summary (Last 24 hours) at 2020 1524  Last data filed at 1/8/2020 1400  Gross per 24 hour   Intake 2400 ml   Output 7775 ml   Net -5375 ml       Physical Exam:     Physical Exam   Constitutional: She is oriented to person, place, and time  She appears well-developed and well-nourished  HENT:   Head: Normocephalic and atraumatic  Eyes: Pupils are equal, round, and reactive to light  EOM are normal    Neck: Neck supple  Cardiovascular: Normal rate and regular rhythm  Pulmonary/Chest: Effort normal    Abdominal: Soft  Genitourinary:   Genitourinary Comments: Bernardo catheter, clear yellow urine   Neurological: She is alert and oriented to person, place, and time  Skin: Skin is warm and dry  Additional Data:     Labs:    Results from last 7 days   Lab Units 01/08/20  0521  01/05/20  0916   WBC Thousand/uL 5 17   < > 7 90   HEMOGLOBIN g/dL 8 0*   < > 7 7*   HEMATOCRIT % 25 0*   < > 23 5*   PLATELETS Thousands/uL 315   < > 236   NEUTROS PCT %  --   --  79*   LYMPHS PCT %  --   --  13*   MONOS PCT %  --   --  6   EOS PCT %  --   --  1    < > = values in this interval not displayed       Results from last 7 days   Lab Units 01/08/20  0521 01/07/20  0509   SODIUM mmol/L 140 136   POTASSIUM mmol/L 3 7 3 8   CHLORIDE mmol/L 105 101   CO2 mmol/L 30 31   BUN mg/dL 5 5   CREATININE mg/dL 0 42* 0 47*   ANION GAP mmol/L 5 4   CALCIUM mg/dL 9 1 8 7   ALBUMIN g/dL  --  2 0*   TOTAL BILIRUBIN mg/dL  --  0 32   ALK PHOS U/L  --  144*   ALT U/L  --  84*   AST U/L  --  88*   GLUCOSE RANDOM mg/dL 91 86     Results from last 7 days   Lab Units 01/02/20  0523   INR  1 01     Results from last 7 days   Lab Units 01/05/20  1157 01/05/20  0546 01/04/20  2358 01/04/20  1719 01/04/20  1138 01/04/20  0028 01/03/20  1715 01/03/20  1544 01/02/20  1929   POC GLUCOSE mg/dl 96 108 117 113 119 122 102 104 162*         Results from last 7 days   Lab Units 01/04/20  0036 01/03/20  1010 01/03/20  0738 01/03/20  0336 01/03/20  0328   LACTIC ACID mmol/L 1 5 1 8 3 7* 3 4*  -- PROCALCITONIN ng/ml  --   --   --   --  <0 05           * I Have Reviewed All Lab Data Listed Above  * Additional Pertinent Lab Tests Reviewed: All Labs Within Last 24 Hours Reviewed        Recent Cultures (last 7 days):     Results from last 7 days   Lab Units 01/03/20  0336 01/03/20  0335   BLOOD CULTURE  No Growth After 5 Days  No Growth After 5 Days  Last 24 Hours Medication List:     Current Facility-Administered Medications:  acetaminophen 975 mg Oral Q8H [facility] Jesu Fox PA-C   aluminum-magnesium hydroxide-simethicone 30 mL Oral Q4H PRN Jesu Fox PA-C   calcium carbonate 1 tablet Oral BID With Meals Jesu Fox PA-C   calcium carbonate 500 mg Oral Daily PRN Jesu Fox PA-C   cholecalciferol 2,000 Units Oral Daily Jesu Fox PA-C   diazepam 2 5 mg Intravenous Q12H Arleth Cervantes MD   enoxaparin 40 mg Subcutaneous Q24H [facility] Connie Gloria PA-C   folic acid 1 mg Oral Daily Jesu Fox PA-C   gabapentin 100 mg Oral TID Jesu Fox PA-C   iron sucrose 300 mg Intravenous Every Other Day Zack Quintanilla DO   levothyroxine 25 mcg Oral HS Yareli Lewis PA-C   lidocaine 2 patch Topical Daily Jesu Fox PA-C   LORazepam 2 mg Oral Q8H PRN Jesu Fox PA-C   methocarbamol 750 mg Oral Q6H [facility] Jesu Fox PA-C   multivitamin-minerals 1 tablet Oral Daily Jesu Fox PA-C   oxyCODONE 5 mg Oral Q4H PRN Daniela Schofield PA-C   pantoprazole 40 mg Oral BID AC Jesu Fox PA-C   PARoxetine 10 mg Oral Daily Jorge Arroyo MD   polyethylene glycol 34 g Oral Daily Yareli Lewis PA-C   QUEtiapine 100 mg Oral HS Jorge Arroyo MD   senna 2 tablet Oral HS Arleth Cervantes MD        Today, Patient Was Seen By: Shital Triana MD    ** Please Note: Dictation voice to text software may have been used in the creation of this document   **

## 2020-01-09 PROBLEM — E87.2 LACTIC ACID INCREASED: Status: RESOLVED | Noted: 2020-01-03 | Resolved: 2020-01-09

## 2020-01-09 PROBLEM — E87.20 LACTIC ACID INCREASED: Status: RESOLVED | Noted: 2020-01-03 | Resolved: 2020-01-09

## 2020-01-09 LAB
ANION GAP SERPL CALCULATED.3IONS-SCNC: 4 MMOL/L (ref 4–13)
BUN SERPL-MCNC: 6 MG/DL (ref 5–25)
CALCIUM SERPL-MCNC: 9 MG/DL (ref 8.3–10.1)
CHLORIDE SERPL-SCNC: 102 MMOL/L (ref 100–108)
CK MB SERPL-MCNC: 1.1 % (ref 0–2.5)
CK MB SERPL-MCNC: 3.4 NG/ML (ref 0–5)
CK SERPL-CCNC: 306 U/L (ref 26–192)
CO2 SERPL-SCNC: 30 MMOL/L (ref 21–32)
CREAT SERPL-MCNC: 0.4 MG/DL (ref 0.6–1.3)
GFR SERPL CREATININE-BSD FRML MDRD: 114 ML/MIN/1.73SQ M
GLUCOSE SERPL-MCNC: 84 MG/DL (ref 65–140)
POTASSIUM SERPL-SCNC: 3.8 MMOL/L (ref 3.5–5.3)
SODIUM SERPL-SCNC: 136 MMOL/L (ref 136–145)

## 2020-01-09 PROCEDURE — 97535 SELF CARE MNGMENT TRAINING: CPT

## 2020-01-09 PROCEDURE — 82550 ASSAY OF CK (CPK): CPT | Performed by: INTERNAL MEDICINE

## 2020-01-09 PROCEDURE — 97530 THERAPEUTIC ACTIVITIES: CPT

## 2020-01-09 PROCEDURE — 80048 BASIC METABOLIC PNL TOTAL CA: CPT | Performed by: INTERNAL MEDICINE

## 2020-01-09 PROCEDURE — 82553 CREATINE MB FRACTION: CPT | Performed by: INTERNAL MEDICINE

## 2020-01-09 PROCEDURE — 99232 SBSQ HOSP IP/OBS MODERATE 35: CPT | Performed by: INTERNAL MEDICINE

## 2020-01-09 PROCEDURE — 97116 GAIT TRAINING THERAPY: CPT

## 2020-01-09 RX ADMIN — PAROXETINE 10 MG: 20 TABLET, FILM COATED ORAL at 08:42

## 2020-01-09 RX ADMIN — CALCIUM 1 TABLET: 500 TABLET ORAL at 08:43

## 2020-01-09 RX ADMIN — Medication 1 TABLET: at 08:42

## 2020-01-09 RX ADMIN — METHOCARBAMOL 750 MG: 750 TABLET, FILM COATED ORAL at 06:18

## 2020-01-09 RX ADMIN — LIDOCAINE 2 PATCH: 50 PATCH CUTANEOUS at 08:45

## 2020-01-09 RX ADMIN — IRON SUCROSE 300 MG: 20 INJECTION, SOLUTION INTRAVENOUS at 08:53

## 2020-01-09 RX ADMIN — OXYCODONE HYDROCHLORIDE 5 MG: 5 SOLUTION ORAL at 13:57

## 2020-01-09 RX ADMIN — ENOXAPARIN SODIUM 40 MG: 40 INJECTION SUBCUTANEOUS at 12:31

## 2020-01-09 RX ADMIN — FOLIC ACID 1 MG: 1 TABLET ORAL at 08:43

## 2020-01-09 RX ADMIN — GABAPENTIN 100 MG: 100 CAPSULE ORAL at 21:43

## 2020-01-09 RX ADMIN — GABAPENTIN 100 MG: 100 CAPSULE ORAL at 17:40

## 2020-01-09 RX ADMIN — CALCIUM 1 TABLET: 500 TABLET ORAL at 17:41

## 2020-01-09 RX ADMIN — MELATONIN 2000 UNITS: at 08:42

## 2020-01-09 RX ADMIN — LORAZEPAM 2 MG: 1 TABLET ORAL at 18:27

## 2020-01-09 RX ADMIN — PANTOPRAZOLE SODIUM 40 MG: 20 TABLET, DELAYED RELEASE ORAL at 17:41

## 2020-01-09 RX ADMIN — ACETAMINOPHEN 975 MG: 325 TABLET ORAL at 06:17

## 2020-01-09 RX ADMIN — GABAPENTIN 100 MG: 100 CAPSULE ORAL at 08:44

## 2020-01-09 RX ADMIN — METHOCARBAMOL 750 MG: 750 TABLET, FILM COATED ORAL at 12:30

## 2020-01-09 RX ADMIN — METHOCARBAMOL 750 MG: 750 TABLET, FILM COATED ORAL at 23:43

## 2020-01-09 RX ADMIN — ACETAMINOPHEN 975 MG: 325 TABLET ORAL at 13:54

## 2020-01-09 RX ADMIN — METHOCARBAMOL 750 MG: 750 TABLET, FILM COATED ORAL at 01:10

## 2020-01-09 RX ADMIN — METHOCARBAMOL 750 MG: 750 TABLET, FILM COATED ORAL at 17:41

## 2020-01-09 RX ADMIN — POLYETHYLENE GLYCOL 3350 34 G: 17 POWDER, FOR SOLUTION ORAL at 08:43

## 2020-01-09 RX ADMIN — BISACODYL 5 MG: 5 TABLET, DELAYED RELEASE ORAL at 10:56

## 2020-01-09 RX ADMIN — PANTOPRAZOLE SODIUM 40 MG: 20 TABLET, DELAYED RELEASE ORAL at 06:18

## 2020-01-09 RX ADMIN — OXYCODONE HYDROCHLORIDE 5 MG: 5 SOLUTION ORAL at 18:31

## 2020-01-09 RX ADMIN — ACETAMINOPHEN 975 MG: 325 TABLET ORAL at 21:42

## 2020-01-09 RX ADMIN — QUETIAPINE FUMARATE 100 MG: 100 TABLET ORAL at 21:43

## 2020-01-09 RX ADMIN — LEVOTHYROXINE SODIUM 25 MCG: 25 TABLET ORAL at 21:43

## 2020-01-09 RX ADMIN — LORAZEPAM 2 MG: 1 TABLET ORAL at 09:41

## 2020-01-09 NOTE — ASSESSMENT & PLAN NOTE
· Was transferred to ICU for suspected NMS and treated for this, this occurred post-operatively with rigidity, tremors, hyperthermia, tachycardia, delirium   · Neurology, toxicology, psychiatry following  · Geodon has been discontinued, do not resume on discharge      · Continue Seroquel at 100 mg HS  · Continue to taper Valium - decrease dose to 25 mg BID for 1 more day then stop  · Paxil restarted by Psychiatry

## 2020-01-09 NOTE — OCCUPATIONAL THERAPY NOTE
Occupational Therapy Treatment Note:     01/09/20 1110   Restrictions/Precautions   Weight Bearing Precautions Per Order Yes   RLE Weight Bearing Per Order WBAT   Other Precautions Cognitive; Chair Alarm;WBS;THR;Fall Risk   Pain Assessment   Pain Score No Pain   ADL   Where Assessed Chair   LB Dressing Assistance 4  Minimal Assistance   LB Dressing Deficit Don/doff R sock; Don/doff L sock; Thread RLE into underwear; Thread LLE into underwear   LB Dressing Comments use of LHAE, continues to require min A fo rreacher use and sock aid use as well as VC for carryover of techqnique   Toileting Assistance  3  Moderate Assistance   Toileting Deficit Clothing management up;Clothing management down;Perineal hygiene   Toileting Comments rasied commode over toilet, mod A for thoroughness   Functional Standing Tolerance   Time ~ 2 mins   Activity static standing   Comments RW level   Bed Mobility   Additional Comments OOB upon presentation and at end of session   Transfers   Sit to Stand 4  Minimal assistance   Additional items Assist x 1   Stand to Sit 4  Minimal assistance   Additional items Assist x 1   Stand pivot 4  Minimal assistance   Additional items Assist x 1   Toilet transfer 4  Minimal assistance   Additional items Assist x 1   Additional Comments Noted improvement this date, continues to require min A x 1 for overall safety and increased VC for carryover of safe tranfer techqniues, RW level   Functional Mobility   Functional Mobility 4  Minimal assistance   Additional Comments to and from bathroom   Additional items Rolling walker   Toilet Transfers   Toilet Transfer From Rolling walker   Toilet Transfer Type To and from   Toilet Transfer to Raised toilet seat with rails   Toilet Transfer Technique Ambulating;Stand pivot   Toilet Transfers Minimal assistance   Toilet Transfers Comments increased VC for safety    Cognition   Overall Cognitive Status Impaired   Arousal/Participation Arousable;Responsive   Attention Attends with cues to redirect   Orientation Level Oriented X4   Memory Decreased recall of precautions   Following Commands Follows one step commands without difficulty   Comments Recalls 2/3 THP this date, written THP on white board in room for external memeory aid with same, increased cues required for carryover of safety and educated on same   Activity Tolerance   Activity Tolerance Patient tolerated treatment well   Medical Staff Made Aware NSG aware   Assessment   Assessment Pt was seen this date for OT Tx session focusing on self care tasks, sit to stnad proressions, standing otelrance, trnafers, funciontal mobility, fall prevetion education, review of precautions and LHAE use, compensatory techniques and overall acitivty tolerance  Pt presnts seated OOB in chair, comepltes previously metnieond tasks at docuemtned assist levels, see above  Pt has made notable progress since last tx session, improved balance and stability with trafners however ocnitnues to require increased VC for safe trnafer tehcqniues and hand placment  Pt utilzied LHAE to practice LB dressing takss this date, reuqires cues for carryover and min A but overall good understnaind of maintinaing precautions with same  P tresting OOB inchair at end of session with all needs in reach  Would benefit from continued OT tx to improve overall funciontal abitlies   Continue to follow with current POC   Plan   Treatment Interventions ADL retraining   Goal Expiration Date 01/20/20   OT Treatment Day 2   OT Frequency 3-5x/wk   Recommendation   OT Discharge Recommendation Short Term [address] [place] [address]

## 2020-01-09 NOTE — PLAN OF CARE
Problem: OCCUPATIONAL THERAPY ADULT  Goal: Performs self-care activities at highest level of function for planned discharge setting  See evaluation for individualized goals  Description  Treatment Interventions: ADL retraining, Functional transfer training, Endurance training, Patient/family training, Equipment evaluation/education, Compensatory technique education, Activityengagement          See flowsheet documentation for full assessment, interventions and recommendations  Outcome: Progressing  Note:   Limitation: Decreased ADL status, Decreased cognition, Decreased self-care trans, Decreased high-level ADLs  Prognosis: Fair  Assessment: Pt was seen this date for OT Tx session focusing on self care tasks, sit to stnad proressions, standing otelrance, trnafers, funciontal mobility, fall prevetion education, review of precautions and LHAE use, compensatory techniques and overall acitivty tolerance  Pt presnts seated OOB in chair, comepltes previously metnieond tasks at docuemtned assist levels, see above  Pt has made notable progress since last tx session, improved balance and stability with trafners however ocnitnues to require increased VC for safe trnafer tehcqniues and hand placment  Pt utilzied LHAE to practice LB dressing takss this date, reuqires cues for carryover and min A but overall good understnaind of maintinaing precautions with same  P tresting OOB inchair at end of session with all needs in reach  Would benefit from continued OT tx to improve overall funciontal abitlies   Continue to follow with current POC     OT Discharge Recommendation: Short Term Rehab  OT - OK to Discharge: Yes(When medically appropriate to rehab)

## 2020-01-09 NOTE — ASSESSMENT & PLAN NOTE
· Pt with urinary retention, suspect in setting of post-operative state/ambulatory dysfunction  · Bernardo placed 1/6/2020  · Voiding trial today

## 2020-01-09 NOTE — PHYSICAL THERAPY NOTE
Physical Therapy Treatment Note       01/09/20 0835   Pain Assessment   Pain Assessment 0-10   Pain Score 4   Pain Type Acute pain;Surgical pain   Pain Location Hip   Pain Orientation Right   Patient's Stated Pain Goal No pain   Hospital Pain Intervention(s) Ambulation/increased activity   Response to Interventions unchanged   Restrictions/Precautions   Weight Bearing Precautions Per Order Yes   RLE Weight Bearing Per Order WBAT   Other Precautions THR;WBS;Multiple lines;Pain; Fall Risk;Impulsive; Chair Alarm; Bed Alarm   General   Chart Reviewed Yes   Family/Caregiver Present No   Cognition   Overall Cognitive Status Impaired   Arousal/Participation Arousable;Responsive   Attention Attends with cues to redirect   Orientation Level Oriented X4   Memory Unable to assess   Following Commands Follows one step commands without difficulty   Subjective   Subjective "Isn't it too early to walk?"   Bed Mobility   Supine to Sit 3  Moderate assistance   Additional items Assist x 1;LE management   Additional Comments sat EOB x 5 min prior to gait due to pain, dizziness   Transfers   Sit to Stand 4  Minimal assistance   Additional items Assist x 1   Stand to Sit 4  Minimal assistance   Additional items Assist x 1   Ambulation/Elevation   Gait pattern   (slow, antalgic, forward flexion)   Gait Assistance 4  Minimal assist   Additional items Assist x 1  (w/ 2nd for chair follow)   Assistive Device Rolling walker   Distance 70'x2 w/ standing rest x 2 x 1-2 min each    repositioned in chair p session   Balance   Static Sitting Good   Dynamic Sitting Fair -   Static Standing Poor +   Dynamic Standing Poor +   Ambulatory Poor +   Endurance Deficit   Endurance Deficit Yes   Endurance Deficit Description fatigue, weakness, dizziness   Activity Tolerance   Activity Tolerance Patient limited by fatigue;Patient limited by pain;Treatment limited secondary to medical complications (Comment)   Nurse Made Aware yes   Assessment   Prognosis Good   Problem List Decreased strength;Decreased endurance; Impaired balance;Decreased mobility; Decreased coordination;Decreased cognition;Pain;Orthopedic restrictions   Assessment Pt seen for session for setup, bed mob, sitting side of bed, transfers, gait w/ rest period, repositioning  Pt cooperative w/ session w/ some encouragement  Pt states she does better later in the day  Needs less assist for all mobility tasks, and able to ambulate an increased distance today  remains appropriate for rehab at d/c   Goals   Patient Goals to rest   STG Expiration Date 01/19/20   PT Treatment Day 3   Plan   Treatment/Interventions Functional transfer training;LE strengthening/ROM; Therapeutic exercise; Endurance training;Patient/family training;Equipment eval/education; Bed mobility;Gait training   Progress Progressing toward goals   PT Frequency   (3-6x/wk)   Recommendation   Recommendation Post acute IP rehab  (recommend rehab at d/c)   Equipment Recommended Doris Riggins   PT - OK to Discharge Yes  (when stable to rehab)   Bartolo Arrieta PT, DPT CSRS

## 2020-01-09 NOTE — PROGRESS NOTES
Patient straight cath'd at 1630  Patient now is complaining of bladder pressure and feels like she has to go  She was bladder scanned for 404  Yuni from [address] was paged  Patient will attempt to void and we will bladder scan her 4 hours after straight cath  Will continue to monitor

## 2020-01-09 NOTE — PLAN OF CARE
Problem: PHYSICAL THERAPY ADULT  Goal: Performs mobility at highest level of function for planned discharge setting  See evaluation for individualized goals  Outcome: Progressing  Note:   Prognosis: Good  Problem List: Decreased strength, Decreased endurance, Impaired balance, Decreased mobility, Decreased coordination, Decreased cognition, Pain, Orthopedic restrictions  Assessment: Pt seen for session for setup, bed mob, sitting side of bed, transfers, gait w/ rest period, repositioning  Pt cooperative w/ session w/ some encouragement  Pt states she does better later in the day  Needs less assist for all mobility tasks, and able to ambulate an increased distance today  remains appropriate for rehab at d/c  Barriers to Discharge: Decreased caregiver support, Inaccessible home environment     Recommendation: Post acute IP rehab(recommend rehab at d/c)     PT - OK to Discharge: (S) Yes(when stable to rehab)    See flowsheet documentation for full assessment

## 2020-01-09 NOTE — PROGRESS NOTES
Patient failed voiding trial, was straight cath for 1800 ml  [place]akes texted Dr Terrye Severin requesting urology consult

## 2020-01-09 NOTE — PROGRESS NOTES
Progress Note - Eric Evans 1960, 61 y o  female MRN: [id number]    Unit/Bed#: -01 Encounter: [id number]    Primary Care Provider: Kim Singh MD   Date and time admitted to hospital: 12/27/2019 10:59 AM        * Closed right hip fracture [facility])  Assessment & Plan  · S/p OR with orthopedics on 1/2   · CT femur R w/o contrast (12/31/19): Obliquely oriented almost nondisplaced proximal femoral shaft fracture around the site of the femoral stem without significant displacement of the hardware  Moderate amount of soft tissue calcification in the vicinity of the injury  Diminished density of the vastus intermedius muscle probably representing some muscular edema in the deep compartment  No dislocation at the hip  · Pain control PRN  · PT/OT evaluation appreciated will need rehab   · Continue Gabapentin 100 mg TID    Urinary retention  Assessment & Plan  · Pt with urinary retention, suspect in setting of post-operative state/ambulatory dysfunction  · Bernardo placed 1/6/2020  · Voiding trial today    NMS (neuroleptic malignant syndrome)  Assessment & Plan  · Was transferred to ICU for suspected NMS and treated for this, this occurred post-operatively with rigidity, tremors, hyperthermia, tachycardia, delirium   · Neurology, toxicology, psychiatry following  · Geodon has been discontinued, do not resume on discharge      · Continue Seroquel at 100 mg HS  · Continue to taper Valium - decrease dose to 25 mg BID for 1 more day then stop  · Paxil restarted by Psychiatry    Iron deficiency anemia secondary to inadequate dietary iron intake  Assessment & Plan  · Continue iron supplementation  · Hgb has been stable   · Monitor CBC     Chronic bilateral low back pain without sciatica  Assessment & Plan  · Continue Tylenol, oxycodone, dilaudid for breakthrough  · Gabapentin t i d  · PT/OT    ABIMAEL (generalized anxiety disorder)  Assessment & Plan  · Home regimen of Lorazepam p r n, paroxetine daily, Geodon b i d, Seroquel at bedtime  · This regimen has been discontinued given concern for NMS - Geodon and Paxil d/c  Psychiatry input appreciated  Resume Seroquel at 50 mg qhs, increase dose every 2 days by 50 mg until back at home dosing     Schizoaffective disorder, depressive type ([facility] [address] )  Assessment & Plan  · Home regimen consists of 400 mg Seroquel at bedtime, Geodon 80 mg BID and Ativan p r n  · Patient was transferred to ICU for suspected NMS  This has been treated  · Appreciate psychiatry input  Will plan to resume Seroquel at this time starting at 50 mg qhs, and increase dose by 50 mg every 2 days until back on 400 mg dosing  Paxil restarted by Psychiatry  · Seroquel resumed on 1/7/2020     Non-traumatic rhabdomyolysis  Assessment & Plan  · Suspected 2/2 NMS   · Improving     Acquired hypothyroidism  Assessment & Plan  · Continue daily levothyroxine    Localized osteoporosis with current pathological fracture with routine healing  Assessment & Plan  · Vitamin-D supplementation  · Outpatient endocrine follow-up    Chronic hyponatremia  Assessment & Plan  · Psychiatric meds likely contribute to hyponatremia  · This has resolved        VTE Pharmacologic Prophylaxis:   Pharmacologic: Heparin  Mechanical VTE Prophylaxis in Place: Yes    Patient Centered Rounds: I have performed bedside rounds with nursing staff today  Discussions with Specialists or Other Care Team Provider:  Psychiatry    Education and Discussions with Family / Patient:  Patient, plan of care    Time Spent for Care: 20 minutes  More than 50% of total time spent on counseling and coordination of care as described above  Current Length of Stay: 11 day(s)    Current Patient Status: Inpatient   Certification Statement: The patient will continue to require additional inpatient hospital stay due to Wean Valium, discharge planning    Discharge Plan:  SNF tomorrow    Code Status: Level 1 - Full Code      Subjective:   Reports constipation  Tolerating diet  Objective:     Vitals:   Temp (24hrs), Av °F (36 7 °C), Min:97 5 °F (36 4 °C), Max:98 3 °F (36 8 °C)    Temp:  [97 5 °F (36 4 °C)-98 3 °F (36 8 °C)] 98 3 °F (36 8 °C)  HR:  [] 101  Resp:  [18] 18  BP: (122-131)/(77-83) 131/83  SpO2:  [100 %] 100 %  Body mass index is 30 85 kg/m²  Input and Output Summary (last 24 hours): Intake/Output Summary (Last 24 hours) at 2020 1503  Last data filed at 2020 1245  Gross per 24 hour   Intake 2050 ml   Output 5175 ml   Net -3125 ml       Physical Exam:     Physical Exam   Constitutional: She is oriented to person, place, and time  She appears well-developed and well-nourished  HENT:   Head: Normocephalic and atraumatic  Eyes: Pupils are equal, round, and reactive to light  EOM are normal    Neck: Neck supple  Cardiovascular: Normal rate and regular rhythm  Pulmonary/Chest: Effort normal  She has no wheezes  She has no rales  Abdominal: Soft  She exhibits no distension  Musculoskeletal: She exhibits no edema  Neurological: She is alert and oriented to person, place, and time  Skin: Skin is warm and dry  Additional Data:     Labs:    Results from last 7 days   Lab Units 20  0521  20  0916   WBC Thousand/uL 5 17   < > 7 90   HEMOGLOBIN g/dL 8 0*   < > 7 7*   HEMATOCRIT % 25 0*   < > 23 5*   PLATELETS Thousands/uL 315   < > 236   NEUTROS PCT %  --   --  79*   LYMPHS PCT %  --   --  13*   MONOS PCT %  --   --  6   EOS PCT %  --   --  1    < > = values in this interval not displayed       Results from last 7 days   Lab Units 20  0448  20  0509   SODIUM mmol/L 136   < > 136   POTASSIUM mmol/L 3 8   < > 3 8   CHLORIDE mmol/L 102   < > 101   CO2 mmol/L 30   < > 31   BUN mg/dL 6   < > 5   CREATININE mg/dL 0 40*   < > 0 47*   ANION GAP mmol/L 4   < > 4   CALCIUM mg/dL 9 0   < > 8 7   ALBUMIN g/dL  --   --  2 0*   TOTAL BILIRUBIN mg/dL  --   --  0 32   ALK PHOS U/L  --   --  144*   ALT U/L  --   -- 84*   AST U/L  --   --  88*   GLUCOSE RANDOM mg/dL 84   < > 86    < > = values in this interval not displayed  Results from last 7 days   Lab Units 01/05/20  1157 01/05/20  0546 01/04/20  2358 01/04/20  1719 01/04/20  1138 01/04/20  0028 01/03/20  1715 01/03/20  1544 01/02/20  1929   POC GLUCOSE mg/dl 96 108 117 113 119 122 102 104 162*         Results from last 7 days   Lab Units 01/04/20  0036 01/03/20  1010 01/03/20  0738 01/03/20  0336 01/03/20  0328   LACTIC ACID mmol/L 1 5 1 8 3 7* 3 4*  --    PROCALCITONIN ng/ml  --   --   --   --  <0 05           * I Have Reviewed All Lab Data Listed Above  * Additional Pertinent Lab Tests Reviewed: All Labs Within Last 24 Hours Reviewed        Recent Cultures (last 7 days):     Results from last 7 days   Lab Units 01/03/20  0336 01/03/20  0335   BLOOD CULTURE  No Growth After 5 Days  No Growth After 5 Days         Last 24 Hours Medication List:     Current Facility-Administered Medications:  acetaminophen 975 mg Oral [facility] Khadijah Ramirez PA-C   aluminum-magnesium hydroxide-simethicone 30 mL Oral Q4H PRN Khadijah Ramirez PA-C   calcium carbonate 1 tablet Oral BID With Meals Khadijah Ramirez PA-C   calcium carbonate 500 mg Oral Daily PRN Khadijah Ramirez PA-C   cholecalciferol 2,000 Units Oral Daily Khadijah Ramirez PA-C   diazepam 2 5 mg Intravenous Q12H Phyllis Hatfield MD   enoxaparin 40 mg Subcutaneous Q24H [address] Connie Gloria PA-C   folic acid 1 mg Oral Daily Khadijah Ramirez PA-C   gabapentin 100 mg Oral TID Khadijah Ramirez PA-C   levothyroxine 25 mcg Oral HS Yareli Lewis PA-C   lidocaine 2 patch Topical Daily Khadijah Ramirez PA-C   LORazepam 2 mg Oral Q8H PRN Khadijah Ramirez PA-C   methocarbamol 750 mg Oral Q6H Irish Mom, PA-C   multivitamin-minerals 1 tablet Oral Daily Khadijah Ramirez PA-C   oxyCODONE 5 mg Oral Q4H PRN Davonte Bravo PA-C   pantoprazole 40 mg Oral BID AC Khadijah Ramirez PA-C   PARoxetine 10 mg Oral Daily Jose Rodriguez MD Kelly   QUEtiapine 100 mg Oral HS Jessica Smith MD        Today, Patient Was Seen By: Stephan Hawk MD    ** Please Note: Dictation voice to text software may have been used in the creation of this document   **

## 2020-01-09 NOTE — RESTORATIVE TECHNICIAN NOTE
Restorative Specialist Mobility Note       Activity: Ambulate in solorio, Ambulate in room, Bathroom privileges, Chair, Dangle, Stand at bedside(Educated/encouraged pt to ambulate with assistance 3-4 x's/day  Chair alarm on   Pt callbell, phone/tray within reach )     Assistive Device: Front wheel walker       Trevon DACOSTA, Restorative Technician, [employer]

## 2020-01-10 ENCOUNTER — PATIENT OUTREACH (OUTPATIENT)
Dept: INTERNAL MEDICINE CLINIC | Facility: CLINIC | Age: 60
End: 2020-01-10

## 2020-01-10 LAB
ANION GAP SERPL CALCULATED.3IONS-SCNC: 2 MMOL/L (ref 4–13)
ANISOCYTOSIS BLD QL SMEAR: PRESENT
BASOPHILS # BLD MANUAL: 0 THOUSAND/UL (ref 0–0.1)
BASOPHILS NFR MAR MANUAL: 0 % (ref 0–1)
BUN SERPL-MCNC: 4 MG/DL (ref 5–25)
CALCIUM SERPL-MCNC: 8.6 MG/DL (ref 8.3–10.1)
CHLORIDE SERPL-SCNC: 103 MMOL/L (ref 100–108)
CO2 SERPL-SCNC: 31 MMOL/L (ref 21–32)
CREAT SERPL-MCNC: 0.47 MG/DL (ref 0.6–1.3)
EOSINOPHIL # BLD MANUAL: 0.07 THOUSAND/UL (ref 0–0.4)
EOSINOPHIL NFR BLD MANUAL: 1 % (ref 0–6)
ERYTHROCYTE [DISTWIDTH] IN BLOOD BY AUTOMATED COUNT: 20.3 % (ref 11.6–15.1)
GFR SERPL CREATININE-BSD FRML MDRD: 108 ML/MIN/1.73SQ M
GLUCOSE SERPL-MCNC: 82 MG/DL (ref 65–140)
HCT VFR BLD AUTO: 26 % (ref 34.8–46.1)
HGB BLD-MCNC: 8.1 G/DL (ref 11.5–15.4)
LYMPHOCYTES # BLD AUTO: 0.92 THOUSAND/UL (ref 0.6–4.47)
LYMPHOCYTES # BLD AUTO: 13 % (ref 14–44)
MCH RBC QN AUTO: 29.7 PG (ref 26.8–34.3)
MCHC RBC AUTO-ENTMCNC: 31.2 G/DL (ref 31.4–37.4)
MCV RBC AUTO: 95 FL (ref 82–98)
MONOCYTES # BLD AUTO: 0.14 THOUSAND/UL (ref 0–1.22)
MONOCYTES NFR BLD: 2 % (ref 4–12)
MYELOCYTES NFR BLD MANUAL: 2 % (ref 0–1)
NEUTROPHILS # BLD MANUAL: 5.78 THOUSAND/UL (ref 1.85–7.62)
NEUTS SEG NFR BLD AUTO: 82 % (ref 43–75)
NRBC BLD AUTO-RTO: 2 /100 WBC (ref 0–2)
NRBC BLD AUTO-RTO: 2 /100 WBCS
PLATELET # BLD AUTO: 319 THOUSANDS/UL (ref 149–390)
PLATELET BLD QL SMEAR: ADEQUATE
PMV BLD AUTO: 9.2 FL (ref 8.9–12.7)
POIKILOCYTOSIS BLD QL SMEAR: PRESENT
POLYCHROMASIA BLD QL SMEAR: PRESENT
POTASSIUM SERPL-SCNC: 3.7 MMOL/L (ref 3.5–5.3)
RBC # BLD AUTO: 2.73 MILLION/UL (ref 3.81–5.12)
RBC MORPH BLD: PRESENT
SODIUM SERPL-SCNC: 136 MMOL/L (ref 136–145)
WBC # BLD AUTO: 7.05 THOUSAND/UL (ref 4.31–10.16)

## 2020-01-10 PROCEDURE — 85027 COMPLETE CBC AUTOMATED: CPT | Performed by: INTERNAL MEDICINE

## 2020-01-10 PROCEDURE — 99232 SBSQ HOSP IP/OBS MODERATE 35: CPT | Performed by: PHYSICIAN ASSISTANT

## 2020-01-10 PROCEDURE — 99024 POSTOP FOLLOW-UP VISIT: CPT | Performed by: ORTHOPAEDIC SURGERY

## 2020-01-10 PROCEDURE — 99232 SBSQ HOSP IP/OBS MODERATE 35: CPT | Performed by: INTERNAL MEDICINE

## 2020-01-10 PROCEDURE — 80048 BASIC METABOLIC PNL TOTAL CA: CPT | Performed by: INTERNAL MEDICINE

## 2020-01-10 PROCEDURE — 85007 BL SMEAR W/DIFF WBC COUNT: CPT | Performed by: INTERNAL MEDICINE

## 2020-01-10 RX ORDER — LEVOTHYROXINE SODIUM 0.03 MG/1
25 TABLET ORAL
Status: DISCONTINUED | OUTPATIENT
Start: 2020-01-12 | End: 2020-01-11

## 2020-01-10 RX ADMIN — METHOCARBAMOL 750 MG: 750 TABLET, FILM COATED ORAL at 11:09

## 2020-01-10 RX ADMIN — ACETAMINOPHEN 975 MG: 325 TABLET ORAL at 22:09

## 2020-01-10 RX ADMIN — OXYCODONE HYDROCHLORIDE 5 MG: 5 SOLUTION ORAL at 18:58

## 2020-01-10 RX ADMIN — PAROXETINE 10 MG: 20 TABLET, FILM COATED ORAL at 08:31

## 2020-01-10 RX ADMIN — GABAPENTIN 100 MG: 100 CAPSULE ORAL at 22:10

## 2020-01-10 RX ADMIN — CALCIUM 1 TABLET: 500 TABLET ORAL at 08:31

## 2020-01-10 RX ADMIN — PANTOPRAZOLE SODIUM 40 MG: 20 TABLET, DELAYED RELEASE ORAL at 05:21

## 2020-01-10 RX ADMIN — ACETAMINOPHEN 975 MG: 325 TABLET ORAL at 13:23

## 2020-01-10 RX ADMIN — QUETIAPINE FUMARATE 100 MG: 100 TABLET ORAL at 22:10

## 2020-01-10 RX ADMIN — FOLIC ACID 1 MG: 1 TABLET ORAL at 08:31

## 2020-01-10 RX ADMIN — GABAPENTIN 100 MG: 100 CAPSULE ORAL at 16:24

## 2020-01-10 RX ADMIN — OXYCODONE HYDROCHLORIDE 5 MG: 5 SOLUTION ORAL at 12:16

## 2020-01-10 RX ADMIN — CALCIUM 1 TABLET: 500 TABLET ORAL at 16:24

## 2020-01-10 RX ADMIN — LIDOCAINE 2 PATCH: 50 PATCH CUTANEOUS at 08:32

## 2020-01-10 RX ADMIN — METHOCARBAMOL 750 MG: 750 TABLET, FILM COATED ORAL at 17:20

## 2020-01-10 RX ADMIN — LORAZEPAM 2 MG: 1 TABLET ORAL at 09:40

## 2020-01-10 RX ADMIN — METHOCARBAMOL 750 MG: 750 TABLET, FILM COATED ORAL at 05:21

## 2020-01-10 RX ADMIN — ENOXAPARIN SODIUM 40 MG: 40 INJECTION SUBCUTANEOUS at 12:01

## 2020-01-10 RX ADMIN — METHOCARBAMOL 750 MG: 750 TABLET, FILM COATED ORAL at 23:08

## 2020-01-10 RX ADMIN — ACETAMINOPHEN 975 MG: 325 TABLET ORAL at 05:21

## 2020-01-10 RX ADMIN — Medication 1 TABLET: at 08:31

## 2020-01-10 RX ADMIN — MELATONIN 2000 UNITS: at 08:31

## 2020-01-10 RX ADMIN — PANTOPRAZOLE SODIUM 40 MG: 20 TABLET, DELAYED RELEASE ORAL at 16:24

## 2020-01-10 RX ADMIN — GABAPENTIN 100 MG: 100 CAPSULE ORAL at 08:31

## 2020-01-10 NOTE — SOCIAL WORK
CM contacted [facility] [phone] and requested a supervisor to speak to regarding the determination letter for placement  Pt is ready for placement and MC needs to know the letter is still valid  YASMIN left VM for supervisor, Sherrie Hernandez

## 2020-01-10 NOTE — ASSESSMENT & PLAN NOTE
· Was transferred to ICU for suspected NMS and treated for this, this occurred post-operatively with rigidity, tremors, hyperthermia, tachycardia, delirium   · Geodon has been discontinued, do not resume on discharge      · Continue Seroquel at 100 mg HS  · Completed Valium taper  · Paxil restarted by Psychiatry

## 2020-01-10 NOTE — PROGRESS NOTES
Subjective: No acute events overnight  Pain controlled  Has been able to ambulate with PT  Having issues with urinary retention  Objective:  A 10 point ROS was performed; negative except as noted above       Lab Results   Component Value Date/Time    WBC 7 05 01/10/2020 04:44 AM    HGB 8 1 (L) 01/10/2020 04:44 AM       Vitals:    01/09/20 2200   BP: 126/80   Pulse: 100   Resp: 19   Temp: 98 2 °F (36 8 °C)   SpO2: 100%     Right lower extremity  Dressing C/D/I  Motor intact to EHL/FHL/TA/GS  Sensation intact to light touch to dp/sp/tib/orly/saph distributions  Toes warm and well perfused with brisk capillary refill    Assessment: 61 y o  female post op day #8 from revision right ELIZABETH and ORIF     Plan:  WBAT RLE  Posterior hip precautions and abduction pillow   Pain control  DVT ppx: Sequential compression device (Venodyne)  and Enoxaparin (Lovenox)  PT/OT  Patient noted to have acute blood loss anemia due to a drop in Hbg of > 2 0g from preop levels, will monitor vital signs and resuscitate with IV fluids as needed  Dispo: [id number] Yany Keys for discharge from ortho perspective

## 2020-01-10 NOTE — PROGRESS NOTES
Chart review reveals that patient remains hospitalized at [facility] s/ revision of R hip secondary to a periprosthetic fracture due to a mechanical fall  Patient had the fracture surgically reduced and then hardware was replaced  Hip x-rays reveal stable at this time  Patient has had a complicated surgical course as she developed neuroleptic malignant syndrome and needed to be sent to MICU  Patient is now stable and awaiting a bed for inpatient rehab

## 2020-01-10 NOTE — PROGRESS NOTES
Progress Note - Domenica Hobbs 1960, 61 y o  female MRN: [id number]    Unit/Bed#: -01 Encounter: [id number]    Primary Care Provider: Doyle Mckoy MD   Date and time admitted to hospital: 12/27/2019 10:59 AM        * Closed right hip fracture [facility])  Assessment & Plan  · S/p OR with orthopedics on 1/2   · CT femur R w/o contrast (12/31/19): Obliquely oriented almost nondisplaced proximal femoral shaft fracture around the site of the femoral stem without significant displacement of the hardware  Moderate amount of soft tissue calcification in the vicinity of the injury  Diminished density of the vastus intermedius muscle probably representing some muscular edema in the deep compartment  No dislocation at the hip  · Pain control PRN  · PT/OT evaluation appreciated will need rehab   · Continue Gabapentin 100 mg TID    Urinary retention  Assessment & Plan  · Pt with urinary retention, suspect in setting of post-operative state/ambulatory dysfunction  · Bernardo catheter removed  · Monitoring postvoid residuals, retention protocol    NMS (neuroleptic malignant syndrome)  Assessment & Plan  · Was transferred to ICU for suspected NMS and treated for this, this occurred post-operatively with rigidity, tremors, hyperthermia, tachycardia, delirium   · Geodon has been discontinued, do not resume on discharge      · Continue Seroquel at 100 mg HS  · Completed Valium taper  · Paxil restarted by Psychiatry    Iron deficiency anemia secondary to inadequate dietary iron intake  Assessment & Plan  · Continue iron supplementation  · Hgb has been stable   · Monitor CBC     Chronic bilateral low back pain without sciatica  Assessment & Plan  · Continue Tylenol, oxycodone, dilaudid for breakthrough  · Gabapentin t i d  · PT/OT    ABIMAEL (generalized anxiety disorder)  Assessment & Plan  · Continue psychiatric medications as above    Schizoaffective disorder, depressive type ([facility] [address] )  Assessment & Plan  · Home regimen consists of 400 mg Seroquel at bedtime, Geodon 80 mg BID and Ativan p r n  · Patient was transferred to ICU for suspected NMS  This has been treated  · Continue Paxil and Seroquel at current dose  · SNF is requesting a competency evaluation, will consult with neuropsychology    Non-traumatic rhabdomyolysis  Assessment & Plan  · Suspected 2/2 NMS   · Improving     Acquired hypothyroidism  Assessment & Plan  · Continue daily levothyroxine    Localized osteoporosis with current pathological fracture with routine healing  Assessment & Plan  · Vitamin-D supplementation  · Outpatient endocrine follow-up    Chronic hyponatremiaresolved as of 1/10/2020  Assessment & Plan  · Psychiatric meds likely contribute to hyponatremia  · This has resolved        VTE Pharmacologic Prophylaxis:   Pharmacologic: Heparin  Mechanical VTE Prophylaxis in Place: Yes    Patient Centered Rounds: I have performed bedside rounds with nursing staff today  Education and Discussions with Family / Patient:  Patient, plan of care    Time Spent for Care: 20 minutes  More than 50% of total time spent on counseling and coordination of care as described above  Current Length of Stay: 12 day(s)    Current Patient Status: Inpatient   Certification Statement: The patient will continue to require additional inpatient hospital stay due to Discharge planning    Discharge Plan:  SNF when bed available    Code Status: Level 1 - Full Code      Subjective:   Denies any acute complaints  Does report an adequate bowel movement today  Objective:     Vitals:   Temp (24hrs), Av 9 °F (36 6 °C), Min:97 6 °F (36 4 °C), Max:98 2 °F (36 8 °C)    Temp:  [97 6 °F (36 4 °C)-98 2 °F (36 8 °C)] 97 6 °F (36 4 °C)  HR:  [] 97  Resp:  [19-20] 20  BP: (125-126)/(76-80) 126/76  SpO2:  [99 %-100 %] 99 %  Body mass index is 30 62 kg/m²  Input and Output Summary (last 24 hours):        Intake/Output Summary (Last 24 hours) at 1/10/2020 1720  Last data filed at 1/10/2020 1628  Gross per 24 hour   Intake 960 ml   Output 4929 ml   Net -3969 ml       Physical Exam:     Physical Exam   Constitutional: She is oriented to person, place, and time  She appears well-developed and well-nourished  HENT:   Head: Normocephalic and atraumatic  Eyes: Pupils are equal, round, and reactive to light  EOM are normal    Neck: Neck supple  Cardiovascular: Normal rate and regular rhythm  Pulmonary/Chest: Effort normal    Abdominal: Soft  Neurological: She is alert and oriented to person, place, and time  Skin: Skin is warm and dry  Psychiatric: She has a normal mood and affect  Her behavior is normal          Additional Data:     Labs:    Results from last 7 days   Lab Units 01/10/20  0444  01/05/20  0916   WBC Thousand/uL 7 05   < > 7 90   HEMOGLOBIN g/dL 8 1*   < > 7 7*   HEMATOCRIT % 26 0*   < > 23 5*   PLATELETS Thousands/uL 319   < > 236   NEUTROS PCT %  --   --  79*   LYMPHS PCT %  --   --  13*   LYMPHO PCT % 13*  --   --    MONOS PCT %  --   --  6   MONO PCT % 2*  --   --    EOS PCT % 1  --  1    < > = values in this interval not displayed  Results from last 7 days   Lab Units 01/10/20  0444  01/07/20  0509   SODIUM mmol/L 136   < > 136   POTASSIUM mmol/L 3 7   < > 3 8   CHLORIDE mmol/L 103   < > 101   CO2 mmol/L 31   < > 31   BUN mg/dL 4*   < > 5   CREATININE mg/dL 0 47*   < > 0 47*   ANION GAP mmol/L 2*   < > 4   CALCIUM mg/dL 8 6   < > 8 7   ALBUMIN g/dL  --   --  2 0*   TOTAL BILIRUBIN mg/dL  --   --  0 32   ALK PHOS U/L  --   --  144*   ALT U/L  --   --  84*   AST U/L  --   --  88*   GLUCOSE RANDOM mg/dL 82   < > 86    < > = values in this interval not displayed           Results from last 7 days   Lab Units 01/05/20  1157 01/05/20  0546 01/04/20  2358 01/04/20  1719 01/04/20  1138 01/04/20  0028   POC GLUCOSE mg/dl 96 108 117 113 119 122         Results from last 7 days   Lab Units 01/04/20  0036   LACTIC ACID mmol/L 1 5           * I Have Reviewed All Lab Data Listed Above  * Additional Pertinent Lab Tests Reviewed: All Labs Within Last 24 Hours Reviewed        Recent Cultures (last 7 days):           Last 24 Hours Medication List:     Current Facility-Administered Medications:  acetaminophen 975 mg Oral [facility] Conner Nazario PA-C   aluminum-magnesium hydroxide-simethicone 30 mL Oral Q4H PRN Conner Nazario PA-C   calcium carbonate 1 tablet Oral BID With Meals Conner Nazario PA-C   calcium carbonate 500 mg Oral Daily PRN Conner Nazario PA-C   cholecalciferol 2,000 Units Oral Daily Conner Nazario PA-C   enoxaparin 40 mg Subcutaneous Q24H [address] Connie Gloria PA-C   folic acid 1 mg Oral Daily Conner Nazario PA-C   gabapentin 100 mg Oral TID Conner Nazario PA-C   levothyroxine 25 mcg Oral HS Yareli Lewis PA-C   lidocaine 2 patch Topical Daily Conner Nazario PA-C   LORazepam 2 mg Oral Q8H PRN Conner Nazario PA-C   methocarbamol 750 mg Oral Q6H [facility]   multivitamin-minerals 1 tablet Oral Daily Conner Nazario PA-C   oxyCODONE 5 mg Oral Q4H PRN Marylee Batters, PA-C   pantoprazole 40 mg Oral BID AC Conner Nazario PA-C   PARoxetine 10 mg Oral Daily Jimmy Garsia MD   QUEtiapine 100 mg Oral HS Jimmy Garsia MD        Today, Patient Was Seen By: Juan Parry MD    ** Please Note: Dictation voice to text software may have been used in the creation of this document   **

## 2020-01-10 NOTE — SOCIAL WORK
IP rehab follow up:     CM received call from Lety at [facility]; she reported bed at [facility]  CM spoke with Dr Romy Cullen; he reported that the patient is medically ready for dc to rehab  CM advised Lety; she stated that the insurance auth will be submitted this am    CM advised Dr Romy Cullen and FRANCIA Sharma of pt's pending insurance auth to go to rehab      1154 hrs:   FRANCIA Sharma advised CM that the patient is requesting to speak with CM  CM met with the patient; she reported that her mother had just spoke with Rashaun at [facility] and they have a bed holding for her  CM called and spoke with pt's Mother, she confirmed that conversation  CM saw [place] message from Anatoly at [facility] requesting updated referral for consideration of a bed offer for the patient; CM sent [place] referral update and left vm with Rashaun requesting call back and avail bed status  Dr Romy Cullen and FRANCIA Sharma advised of pending change to [facility] rehab and need for insurance auth  CM called and advised Lety at [facility] that the patient is requesting [facility] rehab; Lety reported that she is having the team stop the rehab authorization at this time  MSW YASMIN Hernandez advised of the above  DC plan- [facility] IP rehab, pend avail bed and insurance authorization  Pt anticipated to need [facility] transport at [address] [facility]

## 2020-01-10 NOTE — ASSESSMENT & PLAN NOTE
· Home regimen consists of 400 mg Seroquel at bedtime, Geodon 80 mg BID and Ativan p r n  · Patient was transferred to ICU for suspected NMS    This has been treated  · Continue Paxil and Seroquel at current dose  · SNF is requesting a competency evaluation, will consult with neuropsychology

## 2020-01-10 NOTE — RESTORATIVE TECHNICIAN NOTE
Restorative Specialist Mobility Note       Activity: Ambulate in solorio, Ambulate in room, Bathroom privileges, Chair, Dangle, Stand at bedside(Educated/encouraged pt to ambulate with assistance 3-4 x's/day  Chair alarm on   Pt callbell, phone/tray within reach )     Assistive Device: Front wheel walker, Other (Comment)(Assist x1 with chair follow )       Bianca DACOSTA, Restorative Technician, [employer]

## 2020-01-10 NOTE — CONSULTS
[address] NOTE   Admission Date: 12/27/2019    Patient Identifiers: Sonya Gonzalez (MRN: [id number])  Service Requesting Consultation:   Service Providing Consultation:  Urology, Montserrat Thomas PA-C  Consults  Date of Service: 1/10/2020    Reason for Consultation:  Urinary retention    History of Present Illness:     Sonya Gonzalez  Female presents with ambulatory dysfunction and right hip pain  She had a total right hip arthroplasty December 11th and was discharged to rehab  However she left AMA and apparently suffered a mechanical fall at home which caused a new periprosthetic fracture  She denies any prior urologic problems  She apparently has failed a voiding trial and has been straight cathed for over 800 mL  A Bernardo catheter will be reinserted  No burning no hematuria  She is requesting for the catheter to be replaced      Past Medical, Past Surgical History:     Past Medical History:   Diagnosis Date    Anxiety     Back pain at L4-L5 level     Schreiber esophagus     Bulimia     Disease of thyroid gland     hypothyroid    GERD (gastroesophageal reflux disease)     Hyperlipidemia     Hypothyroidism     Leukopenia     NMS (neuroleptic malignant syndrome) 01/03/2020    Rheumatoid arthritis involving right hip (HCC)     Sciatica     Seizure ([address] )     due to medication mix up 8/2018 only one historically    Synovial cyst of lumbar spine     Vertigo     Weight gain    :    Past Surgical History:   Procedure Laterality Date    BONE MARROW BIOPSY      BREAST SURGERY      enlargement     COLONOSCOPY      HIP ARTHROPLASTY Right 1/2/2020    Procedure: REVISION TOTAL HIP ARTHROPLASTY WITH REPAIR OF PARIPROSTHETIC FRACTURE - POSTERIOR APPROACH;  Surgeon: Juan Lopez MD;  [facility];  Service: Orthopedics    [place] TOTAL HIP ARTHROPLASTY Right 12/11/2019    Procedure: ARTHROPLASTY HIP TOTAL ANTERIOR;  Surgeon: Mervat Norton MD;  [facility];  Service: Orthopedics    RHINOPLASTY     :    Medications, Allergies:     Current Facility-Administered Medications:     acetaminophen (TYLENOL) tablet 975 mg, 975 mg, Oral, Q8H [facility], Piyush Valdovinos PA-C, 975 mg at 01/10/20 0521    aluminum-magnesium hydroxide-simethicone (MYLANTA) 200-200-20 mg/5 mL oral suspension 30 mL, 30 mL, Oral, Q4H PRN, Piyush Valdovinos PA-C    calcium carbonate (OYSTER SHELL,OSCAL) 500 mg tablet 1 tablet, 1 tablet, Oral, BID With Meals, Piyush Valdovinos PA-C, 1 tablet at 01/10/20 0831    calcium carbonate (TUMS) chewable tablet 500 mg, 500 mg, Oral, Daily PRN, Piyush Valdovinos PA-C, 500 mg at 01/04/20 0831    cholecalciferol (VITAMIN D3) tablet 2,000 Units, 2,000 Units, Oral, Daily, Piyush Valdovinos PA-C, 2,000 Units at 01/10/20 0831    enoxaparin (LOVENOX) subcutaneous injection 40 mg, 40 mg, Subcutaneous, Q24H [facility], Yareli Gloria PA-C, 40 mg at 08/97/61 5875    folic acid (FOLVITE) tablet 1 mg, 1 mg, Oral, Daily, Piyush Valdovinos PA-C, 1 mg at 01/10/20 0831    gabapentin (NEURONTIN) capsule 100 mg, 100 mg, Oral, TID, Piyush Valdovinos PA-C, 100 mg at 01/10/20 0831    levothyroxine tablet 25 mcg, 25 mcg, Oral, HS, Yareli Lewis PA-C, 25 mcg at 01/09/20 2143    lidocaine (LIDODERM) 5 % patch 2 patch, 2 patch, Topical, Daily, Piyush Valdovinos PA-C, 2 patch at 01/10/20 2088    LORazepam (ATIVAN) tablet 2 mg, 2 mg, Oral, Q8H PRN, Piyush Valdovinos PA-C, 2 mg at 01/10/20 0940    methocarbamol (ROBAXIN) tablet 750 mg, 750 mg, Oral, Q6H [facility], Piyush Valdovinos PA-C, 750 mg at 01/10/20 1109    multivitamin-minerals (CENTRUM) tablet 1 tablet, 1 tablet, Oral, Daily, Piyush Valdovinos PA-C, 1 tablet at 01/10/20 0831    oxyCODONE (ROXICODONE) oral solution 5 mg, 5 mg, Oral, Q4H [facility]N, [facility], 5 mg at 01/10/20 1216    pantoprazole (PROTONIX) EC tablet 40 mg, 40 mg, Oral, BID AC, Piyush Valdovinos PA-C, 40 mg at 01/10/20 2021   PARoxetine (PAXIL) tablet 10 mg, 10 mg, Oral, Daily, Rosa France MD, 10 mg at 01/10/20 0831    QUEtiapine (SEROquel) tablet 100 mg, 100 mg, Oral, HS, Rosa France MD, 100 mg at 01/09/20 6642    Allergies: Allergies   Allergen Reactions    Risperdal [Risperidone] Shortness Of Breath     Rapid heart beat, SOB    Zyprexa [Olanzapine] Shortness Of Breath     Rapid heartbeat    Latex Rash     Band aids, adhesives wears normal underwear, can eat bananas  USE PAPER TAPE   :    Social and Family History:   Social History:   Social History     Tobacco Use    Smoking status: Never Smoker    Smokeless tobacco: Never Used   Substance Use Topics    Alcohol use: Never     Frequency: Never     Binge frequency: Never    Drug use: No        Social History     Tobacco Use   Smoking Status Never Smoker   Smokeless Tobacco Never Used       Family History:  Family History   Problem Relation Age of Onset    Uterine cancer Mother     Esophageal cancer Father     Colon cancer Maternal Grandmother    :     Review of Systems:     General: Fever, chills, or night sweats: negative  Cardiac: Negative for chest pain  Pulmonary: Negative for shortness of breath  Gastrointestinal: Abdominal pain negative  Nausea, vomiting, or diarrhea negative,  Genitourinary: See HPI above  Patient does not have hematuria  All other systems queried were negative  Physical Exam:   General: Patient is pleasant and in NAD  Awake and alert  /77   Pulse 95   Temp 97 9 °F (36 6 °C)   Resp 20   Ht 5' 3" (1 6 m)   Wt 78 4 kg (172 lb 13 5 oz)   SpO2 100%   BMI 30 62 kg/m²   Cardiac: Peripheral edema: negative  Pulmonary: Non-labored breathing  Abdomen: Soft, non-tender, non-distended  No surgical scars  No masses, tenderness, hernias noted  Genitourinary: Negative CVA tenderness, negative suprapubic tenderness            Labs:     Lab Results   Component Value Date    HGB 8 1 (L) 01/10/2020    HCT 26 0 (L) 01/10/2020 WBC 7 05 01/10/2020     01/10/2020   ]    Lab Results   Component Value Date     06/01/2017    K 3 7 01/10/2020     01/10/2020    CO2 31 01/10/2020    BUN 4 (L) 01/10/2020    CREATININE 0 47 (L) 01/10/2020    CALCIUM 8 6 01/10/2020    GLUCOSE 155 (H) 01/02/2020   ]    Imaging:   I personally reviewed the images and report of the following studies, and reviewed them with the patient:   none    ASSESSMENT:       1  Urinary retention    PLAN:   - recommend discharged to rehab with Bernardo catheter  - may have another trial of voiding when more ambulatory  - if fails would return the office for urodynamic testing and possible cystoscopy      Thank you for allowing me to participate in this patients care  Please do not hesitate to call with any additional questions    Monserrat Kirkland PA-C

## 2020-01-10 NOTE — ASSESSMENT & PLAN NOTE
· Pt with urinary retention, suspect in setting of post-operative state/ambulatory dysfunction  · Bernardo catheter removed  · Monitoring postvoid residuals, retention protocol

## 2020-01-11 PROBLEM — R33.9 URINARY RETENTION: Status: RESOLVED | Noted: 2020-01-06 | Resolved: 2020-01-11

## 2020-01-11 PROCEDURE — 97110 THERAPEUTIC EXERCISES: CPT | Performed by: PHYSICAL THERAPIST

## 2020-01-11 PROCEDURE — 97116 GAIT TRAINING THERAPY: CPT | Performed by: PHYSICAL THERAPIST

## 2020-01-11 PROCEDURE — 99232 SBSQ HOSP IP/OBS MODERATE 35: CPT | Performed by: INTERNAL MEDICINE

## 2020-01-11 RX ORDER — LEVOTHYROXINE SODIUM 0.03 MG/1
25 TABLET ORAL
Status: DISCONTINUED | OUTPATIENT
Start: 2020-01-11 | End: 2020-01-14 | Stop reason: HOSPADM

## 2020-01-11 RX ADMIN — ACETAMINOPHEN 975 MG: 325 TABLET ORAL at 14:43

## 2020-01-11 RX ADMIN — ACETAMINOPHEN 975 MG: 325 TABLET ORAL at 21:07

## 2020-01-11 RX ADMIN — METHOCARBAMOL 750 MG: 750 TABLET, FILM COATED ORAL at 12:12

## 2020-01-11 RX ADMIN — ENOXAPARIN SODIUM 40 MG: 40 INJECTION SUBCUTANEOUS at 12:13

## 2020-01-11 RX ADMIN — PANTOPRAZOLE SODIUM 40 MG: 20 TABLET, DELAYED RELEASE ORAL at 05:21

## 2020-01-11 RX ADMIN — QUETIAPINE FUMARATE 100 MG: 100 TABLET ORAL at 21:06

## 2020-01-11 RX ADMIN — CALCIUM 1 TABLET: 500 TABLET ORAL at 08:06

## 2020-01-11 RX ADMIN — LIDOCAINE 2 PATCH: 50 PATCH CUTANEOUS at 08:08

## 2020-01-11 RX ADMIN — CALCIUM CARBONATE (ANTACID) CHEW TAB 500 MG 500 MG: 500 CHEW TAB at 14:43

## 2020-01-11 RX ADMIN — GABAPENTIN 100 MG: 100 CAPSULE ORAL at 17:22

## 2020-01-11 RX ADMIN — LEVOTHYROXINE SODIUM 25 MCG: 25 TABLET ORAL at 05:21

## 2020-01-11 RX ADMIN — ACETAMINOPHEN 975 MG: 325 TABLET ORAL at 05:21

## 2020-01-11 RX ADMIN — Medication 1 TABLET: at 08:06

## 2020-01-11 RX ADMIN — FOLIC ACID 1 MG: 1 TABLET ORAL at 08:08

## 2020-01-11 RX ADMIN — METHOCARBAMOL 750 MG: 750 TABLET, FILM COATED ORAL at 05:21

## 2020-01-11 RX ADMIN — MELATONIN 2000 UNITS: at 08:06

## 2020-01-11 RX ADMIN — GABAPENTIN 100 MG: 100 CAPSULE ORAL at 08:06

## 2020-01-11 RX ADMIN — METHOCARBAMOL 750 MG: 750 TABLET, FILM COATED ORAL at 23:40

## 2020-01-11 RX ADMIN — PANTOPRAZOLE SODIUM 40 MG: 20 TABLET, DELAYED RELEASE ORAL at 17:23

## 2020-01-11 RX ADMIN — OXYCODONE HYDROCHLORIDE 5 MG: 5 SOLUTION ORAL at 17:28

## 2020-01-11 RX ADMIN — CALCIUM 1 TABLET: 500 TABLET ORAL at 17:23

## 2020-01-11 RX ADMIN — PAROXETINE 10 MG: 20 TABLET, FILM COATED ORAL at 08:07

## 2020-01-11 RX ADMIN — GABAPENTIN 100 MG: 100 CAPSULE ORAL at 21:07

## 2020-01-11 RX ADMIN — METHOCARBAMOL 750 MG: 750 TABLET, FILM COATED ORAL at 17:23

## 2020-01-11 NOTE — PLAN OF CARE
Problem: PHYSICAL THERAPY ADULT  Goal: Performs mobility at highest level of function for planned discharge setting  See evaluation for individualized goals  Outcome: Progressing  Note:   Prognosis: Good  Problem List: Decreased strength, Decreased range of motion, Decreased endurance, Impaired balance, Decreased mobility, Decreased coordination, Decreased cognition, Impaired judgement, Decreased safety awareness, Pain, Orthopedic restrictions  Assessment: Pt demonstrates increased ambulation distance during today's session and is able to recall 3/3 hip precuations  She is min Ax1 w sit-stand transfer and ambulates min Ax1 w RW for [de-identified]' + 100' w seated rest break in between  Pt seated in reclining chair post session, w call bell in reach, SCD's placed, all needs met, and eating lunch post session  Pt still appropriate for IP rehab upon DC  Barriers to Discharge: Inaccessible home environment, Decreased caregiver support     Recommendation: Post acute IP rehab     PT - OK to Discharge: Yes    See flowsheet documentation for full assessment

## 2020-01-11 NOTE — PHYSICAL THERAPY NOTE
PHYSICAL THERAPY NOTE    Patient Name: Joaquin HERZOG Date: 1/11/2020 01/11/20 1145   Pain Assessment   Pain Assessment 0-10   Pain Score 7   Pain Type Acute pain;Surgical pain   Pain Location Hip   Pain Orientation [facility] Pain Intervention(s) Ambulation/increased activity   Response to Interventions Increased fatigue   Precautions   Total Hip Replacement ADduction; Internal rotation; Flexion   Restrictions/Precautions   Weight Bearing Precautions Per Order Yes   RLE Weight Bearing Per Order WBAT   Other Precautions Cognitive; Fall Risk;Pain;THR   General   Chart Reviewed Yes   Response to Previous Treatment Patient with no complaints from previous session  Family/Caregiver Present No   Cognition   Orientation Level Oriented X4   Subjective   Subjective Pt is eager to ambulate and get out of her chair  Bed Mobility   Additional Comments Pt seated in reclining chair prior to treatment session  Transfers   Sit to Stand 4  Minimal assistance   Additional items Assist x 1   Stand to Sit 4  Minimal assistance   Additional items Assist x 1   Stand pivot 4  Minimal assistance   Additional items Assist x 1   Ambulation/Elevation   Gait pattern Excessively slow; Short stride;Decreased R stance; Improper Weight shift   Gait Assistance 4  Minimal assist   Additional items Assist x 1   Assistive Device Rolling walker   Distance 80' + 100'   Stair Management Assistance Not tested   Balance   Static Sitting Good   Dynamic Sitting Fair -   Static Standing Poor +   Dynamic Standing Poor +   Ambulatory Poor +   Endurance Deficit   Endurance Deficit Yes   Endurance Deficit Description fatigue and weakness   Activity Tolerance   Activity Tolerance Patient limited by fatigue;Patient limited by pain   Nurse Made Aware yes   Exercises   Hip Abduction Sitting;10 reps   Knee AROM Long Arc Quad Sitting;10 reps;Bilateral   Ankle Pumps Sitting;20 reps;Bilateral   Assessment   Prognosis Good   Problem List Decreased strength;Decreased range of motion;Decreased endurance; Impaired balance;Decreased mobility; Decreased coordination;Decreased cognition; Impaired judgement;Decreased safety awareness;Pain;Orthopedic restrictions   Assessment Pt demonstrates increased ambulation distance during today's session and is able to recall 3/3 hip precuations  She is min Ax1 w sit-stand transfer and ambulates min Ax1 w RW for [de-identified]' + 100' w seated rest break in between  Pt seated in reclining chair post session, w call bell in reach, SCD's placed, all needs met, and eating lunch post session  Pt still appropriate for IP rehab upon DC  Barriers to Discharge Inaccessible home environment;Decreased caregiver support   Goals   PT Treatment Day 4   Plan   Treatment/Interventions Functional transfer training;LE strengthening/ROM; Therapeutic exercise; Endurance training;Gait training;Spoke to nursing   Progress Progressing toward goals   PT Frequency   (3-6x/wk)   Recommendation   Recommendation Post acute IP rehab   Equipment Recommended Loly Narayanan   PT - OK to Discharge Yes     Norma Scanlon, PT

## 2020-01-11 NOTE — SOCIAL WORK
CM followed up on discharge planning  Per [facility] unable to accept  CM followed up with Leann at [facility] [facility] regarding referral to [facility]  Per Cathy Arreguin [facility] requesting competency evaluation  Leann at [facility] aware that Pt was seen by psych previously, but due to confusion during d/c plan want confirmation that Pt is competent and can accept unless this completed  SLIM placed consult for Neuro-psych  Awaiting evaluation  CM will follow

## 2020-01-11 NOTE — PROGRESS NOTES
Progress Note - Eric Evans 1960, 61 y o  female MRN: [id number]    Unit/Bed#: -01 Encounter: [id number]    Primary Care Provider: Kim Singh MD   Date and time admitted to hospital: 12/27/2019 10:59 AM        * Closed right hip fracture [facility])  Assessment & Plan  · S/p OR with orthopedics on 1/2   · CT femur R w/o contrast (12/31/19): Obliquely oriented almost nondisplaced proximal femoral shaft fracture around the site of the femoral stem without significant displacement of the hardware  Moderate amount of soft tissue calcification in the vicinity of the injury  Diminished density of the vastus intermedius muscle probably representing some muscular edema in the deep compartment  No dislocation at the hip  · Pain control PRN  · PT/OT evaluation appreciated will need rehab   · Continue Gabapentin 100 mg TID    NMS (neuroleptic malignant syndrome)  Assessment & Plan  · Was transferred to ICU for suspected NMS and treated for this, this occurred post-operatively with rigidity, tremors, hyperthermia, tachycardia, delirium   · Geodon has been discontinued, do not resume on discharge  · Continue Seroquel at 100 mg HS  · Completed Valium taper  · Paxil restarted by Psychiatry    Iron deficiency anemia secondary to inadequate dietary iron intake  Assessment & Plan  · Continue iron supplementation  · Hgb has been stable   · Monitor CBC     Chronic bilateral low back pain without sciatica  Assessment & Plan  · Continue Tylenol, oxycodone, dilaudid for breakthrough  · Gabapentin t i d  · PT/OT    ABIMAEL (generalized anxiety disorder)  Assessment & Plan  · Continue psychiatric medications as above    Schizoaffective disorder, depressive type (HCC)  Assessment & Plan  · Home regimen consists of 400 mg Seroquel at bedtime, Geodon 80 mg BID and Ativan p r n  · Patient was transferred to ICU for suspected NMS    This has been treated  · Continue Paxil and Seroquel at current dose  · SNF is requesting a competency evaluation, will consult with neuropsychology    Non-traumatic rhabdomyolysis  Assessment & Plan  · Suspected 2/2 NMS   · resolved    Acquired hypothyroidism  Assessment & Plan  · Continue daily levothyroxine    Localized osteoporosis with current pathological fracture with routine healing  Assessment & Plan  · Vitamin-D supplementation  · Outpatient endocrine follow-up        VTE Pharmacologic Prophylaxis:   Pharmacologic: Heparin  Mechanical VTE Prophylaxis in Place: Yes    Patient Centered Rounds: I have performed bedside rounds with nursing staff today  Education and Discussions with Family / Patient: patient, plan of care    Time Spent for Care: 20 minutes  More than 50% of total time spent on counseling and coordination of care as described above  Current Length of Stay: 13 day(s)    Current Patient Status: Inpatient   Certification Statement: The patient will continue to require additional inpatient hospital stay due to discharge planning    Discharge Plan: awaiting a bed at rehab    Code Status: Level 1 - Full Code      Subjective:   Denies any acute complaints  Tolerating diet  Constipation resolved  Objective:     Vitals:   Temp (24hrs), Av 4 °F (36 9 °C), Min:97 2 °F (36 2 °C), Max:99 7 °F (37 6 °C)    Temp:  [97 2 °F (36 2 °C)-99 7 °F (37 6 °C)] 97 2 °F (36 2 °C)  HR:  [] 87  Resp:  [17-18] 18  BP: (117-123)/(69-72) 123/69  SpO2:  [95 %-100 %] 100 %  Body mass index is 30 81 kg/m²  Input and Output Summary (last 24 hours): Intake/Output Summary (Last 24 hours) at 2020 1721  Last data filed at 2020 1400  Gross per 24 hour   Intake    Output 3950 ml   Net -3950 ml       Physical Exam:     Physical Exam   Constitutional: She is oriented to person, place, and time  She appears well-developed and well-nourished  HENT:   Head: Normocephalic and atraumatic  Eyes: Pupils are equal, round, and reactive to light  EOM are normal    Neck: Neck supple  Cardiovascular: Normal rate and regular rhythm  Pulmonary/Chest: Effort normal    Abdominal: Soft  She exhibits no distension  Neurological: She is alert and oriented to person, place, and time  Skin: Skin is warm and dry  Additional Data:     Labs:    Results from last 7 days   Lab Units 01/10/20  0444  01/05/20  0916   WBC Thousand/uL 7 05   < > 7 90   HEMOGLOBIN g/dL 8 1*   < > 7 7*   HEMATOCRIT % 26 0*   < > 23 5*   PLATELETS Thousands/uL 319   < > 236   NEUTROS PCT %  --   --  79*   LYMPHS PCT %  --   --  13*   LYMPHO PCT % 13*  --   --    MONOS PCT %  --   --  6   MONO PCT % 2*  --   --    EOS PCT % 1  --  1    < > = values in this interval not displayed  Results from last 7 days   Lab Units 01/10/20  0444  01/07/20  0509   SODIUM mmol/L 136   < > 136   POTASSIUM mmol/L 3 7   < > 3 8   CHLORIDE mmol/L 103   < > 101   CO2 mmol/L 31   < > 31   BUN mg/dL 4*   < > 5   CREATININE mg/dL 0 47*   < > 0 47*   ANION GAP mmol/L 2*   < > 4   CALCIUM mg/dL 8 6   < > 8 7   ALBUMIN g/dL  --   --  2 0*   TOTAL BILIRUBIN mg/dL  --   --  0 32   ALK PHOS U/L  --   --  144*   ALT U/L  --   --  84*   AST U/L  --   --  88*   GLUCOSE RANDOM mg/dL 82   < > 86    < > = values in this interval not displayed  Results from last 7 days   Lab Units 01/05/20  1157 01/05/20  0546 01/04/20  2358   POC GLUCOSE mg/dl 96 108 117                   * I Have Reviewed All Lab Data Listed Above  * Additional Pertinent Lab Tests Reviewed:  All Labs Within Last 24 Hours Reviewed        Recent Cultures (last 7 days):           Last 24 Hours Medication List:     Current Facility-Administered Medications:  acetaminophen 975 mg Oral [facility] LINDA Alva-WAYNE   aluminum-magnesium hydroxide-simethicone 30 mL Oral Q4H PRN Yordan Dante, PA-C   calcium carbonate 1 tablet Oral BID With Meals Yordan Howell PA-C   calcium carbonate 500 mg Oral Daily PRN Yordan Howell, PA-C   cholecalciferol 2,000 Units Oral Daily Andres Holes CORY [employer]ins   enoxaparin 40 mg Subcutaneous Q24H [address] Connie Gloria PA-C   folic acid 1 mg Oral Daily Peña Mock PA-C   gabapentin 100 mg Oral TID Peña Mock PA-C   levothyroxine 25 mcg Oral Early Morning Gui Cobb PA-C   lidocaine 2 patch Topical Daily Peña Mock PA-C   LORazepam 2 mg Oral Q8H PRN Peña Mock PA-C   methocarbamol 750 mg Oral Q6H [address] Peña Mock PA-C   multivitamin-minerals 1 tablet Oral Daily Peña Mock PA-C   oxyCODONE 5 mg Oral Q4H PRN Ashley Su PA-C   pantoprazole 40 mg Oral BID AC Peña Mock PA-C   PARoxetine 10 mg Oral Daily Vicky Tam MD   QUEtiapine 100 mg Oral HS Vicky Tam MD        Today, Patient Was Seen By: Sharon Cobos MD    ** Please Note: Dictation voice to text software may have been used in the creation of this document   **

## 2020-01-12 PROCEDURE — 99232 SBSQ HOSP IP/OBS MODERATE 35: CPT | Performed by: INTERNAL MEDICINE

## 2020-01-12 RX ADMIN — CALCIUM 1 TABLET: 500 TABLET ORAL at 08:44

## 2020-01-12 RX ADMIN — ACETAMINOPHEN 975 MG: 325 TABLET ORAL at 13:33

## 2020-01-12 RX ADMIN — PANTOPRAZOLE SODIUM 40 MG: 20 TABLET, DELAYED RELEASE ORAL at 17:18

## 2020-01-12 RX ADMIN — QUETIAPINE FUMARATE 100 MG: 100 TABLET ORAL at 21:33

## 2020-01-12 RX ADMIN — GABAPENTIN 100 MG: 100 CAPSULE ORAL at 17:18

## 2020-01-12 RX ADMIN — LEVOTHYROXINE SODIUM 25 MCG: 25 TABLET ORAL at 06:14

## 2020-01-12 RX ADMIN — METHOCARBAMOL 750 MG: 750 TABLET, FILM COATED ORAL at 06:17

## 2020-01-12 RX ADMIN — MELATONIN 2000 UNITS: at 08:43

## 2020-01-12 RX ADMIN — GABAPENTIN 100 MG: 100 CAPSULE ORAL at 08:43

## 2020-01-12 RX ADMIN — METHOCARBAMOL 750 MG: 750 TABLET, FILM COATED ORAL at 17:21

## 2020-01-12 RX ADMIN — PANTOPRAZOLE SODIUM 40 MG: 20 TABLET, DELAYED RELEASE ORAL at 06:14

## 2020-01-12 RX ADMIN — FOLIC ACID 1 MG: 1 TABLET ORAL at 08:44

## 2020-01-12 RX ADMIN — ACETAMINOPHEN 975 MG: 325 TABLET ORAL at 21:32

## 2020-01-12 RX ADMIN — OXYCODONE HYDROCHLORIDE 5 MG: 5 SOLUTION ORAL at 08:42

## 2020-01-12 RX ADMIN — ACETAMINOPHEN 975 MG: 325 TABLET ORAL at 06:14

## 2020-01-12 RX ADMIN — LIDOCAINE 2 PATCH: 50 PATCH CUTANEOUS at 08:47

## 2020-01-12 RX ADMIN — GABAPENTIN 100 MG: 100 CAPSULE ORAL at 21:32

## 2020-01-12 RX ADMIN — OXYCODONE HYDROCHLORIDE 5 MG: 5 SOLUTION ORAL at 21:32

## 2020-01-12 RX ADMIN — CALCIUM 1 TABLET: 500 TABLET ORAL at 17:18

## 2020-01-12 RX ADMIN — LORAZEPAM 2 MG: 1 TABLET ORAL at 17:22

## 2020-01-12 RX ADMIN — Medication 1 TABLET: at 08:44

## 2020-01-12 RX ADMIN — ENOXAPARIN SODIUM 40 MG: 40 INJECTION SUBCUTANEOUS at 12:06

## 2020-01-12 RX ADMIN — PAROXETINE 10 MG: 20 TABLET, FILM COATED ORAL at 08:44

## 2020-01-12 RX ADMIN — METHOCARBAMOL 750 MG: 750 TABLET, FILM COATED ORAL at 12:05

## 2020-01-12 NOTE — PROGRESS NOTES
Progress Note - Nelson Rank 1960, 61 y o  female MRN: [id number]    Unit/Bed#: -01 Encounter: [id number]    Primary Care Provider: Miguelito Galicia MD   Date and time admitted to hospital: 12/27/2019 10:59 AM        * Closed right hip fracture [facility])  Assessment & Plan  · S/p OR with orthopedics on 1/2   · CT femur R w/o contrast (12/31/19): Obliquely oriented almost nondisplaced proximal femoral shaft fracture around the site of the femoral stem without significant displacement of the hardware  Moderate amount of soft tissue calcification in the vicinity of the injury  Diminished density of the vastus intermedius muscle probably representing some muscular edema in the deep compartment  No dislocation at the hip  · Pain control PRN  · Continue PT/OT  · Continue Gabapentin 100 mg TID  · Discharge planning for inpatient rehab when a bed is available    NMS (neuroleptic malignant syndrome)  Assessment & Plan  · Was transferred to ICU for suspected NMS and treated for this, this occurred post-operatively with rigidity, tremors, hyperthermia, tachycardia, delirium   · Geodon has been discontinued, do not resume on discharge      · Continue Seroquel at 100 mg HS  · Completed Valium taper  · Paxil restarted by Psychiatry    Iron deficiency anemia secondary to inadequate dietary iron intake  Assessment & Plan  · Continue iron supplementation  · Hgb has been stable   · Monitor CBC     Chronic bilateral low back pain without sciatica  Assessment & Plan  · Continue Tylenol, oxycodone, dilaudid for breakthrough  · Gabapentin t i d  · PT/OT    Non-traumatic rhabdomyolysis  Assessment & Plan  · Suspected 2/2 NMS   · resolved    Acquired hypothyroidism  Assessment & Plan  · Continue daily levothyroxine    Localized osteoporosis with current pathological fracture with routine healing  Assessment & Plan  · Vitamin-D supplementation  · Outpatient endocrine follow-up        VTE Pharmacologic Prophylaxis: Pharmacologic: Heparin  Mechanical VTE Prophylaxis in Place: Yes    Patient Centered Rounds: I have performed bedside rounds with nursing staff today  Education and Discussions with Family / Patient: patient, plan of care    Time Spent for Care: 20 minutes  More than 50% of total time spent on counseling and coordination of care as described above  Current Length of Stay: 14 day(s)    Current Patient Status: Inpatient   Certification Statement: The patient will continue to require additional inpatient hospital stay due to SNF placement    Discharge Plan: SNF    Code Status: Level 1 - Full Code      Subjective:   Reports skin lesion on her left buttock after sitting on a cushion that contained latex  Otherwise denies any acute complaints    Objective:     Vitals:   Temp (24hrs), Av 6 °F (36 4 °C), Min:97 2 °F (36 2 °C), Max:97 9 °F (36 6 °C)    Temp:  [97 2 °F (36 2 °C)-97 9 °F (36 6 °C)] 97 8 °F (36 6 °C)  HR:  [81-87] 85  Resp:  [17-18] 18  BP: (109-124)/(57-69) 124/67  SpO2:  [98 %-100 %] 98 %  Body mass index is 29 8 kg/m²  Input and Output Summary (last 24 hours): Intake/Output Summary (Last 24 hours) at 2020 1326  Last data filed at 2020 1201  Gross per 24 hour   Intake 960 ml   Output 3250 ml   Net -2290 ml       Physical Exam:     Physical Exam   Constitutional: She is oriented to person, place, and time  She appears well-developed and well-nourished  HENT:   Head: Normocephalic and atraumatic  Eyes: Pupils are equal, round, and reactive to light  EOM are normal    Neck: Neck supple  Cardiovascular: Normal rate and regular rhythm  Pulmonary/Chest: Effort normal    Abdominal: Soft  Musculoskeletal: She exhibits no edema  Neurological: She is alert and oriented to person, place, and time  Skin:   2 cm ulceration just posterior to her dressing on the right lower extremity  No surrounding erythema or drainage   Psychiatric: She has a normal mood and affect   Her behavior is normal        Additional Data:     Labs:    Results from last 7 days   Lab Units 01/10/20  0444   WBC Thousand/uL 7 05   HEMOGLOBIN g/dL 8 1*   HEMATOCRIT % 26 0*   PLATELETS Thousands/uL 319   LYMPHO PCT % 13*   MONO PCT % 2*   EOS PCT % 1     Results from last 7 days   Lab Units 01/10/20  0444  01/07/20  0509   SODIUM mmol/L 136   < > 136   POTASSIUM mmol/L 3 7   < > 3 8   CHLORIDE mmol/L 103   < > 101   CO2 mmol/L 31   < > 31   BUN mg/dL 4*   < > 5   CREATININE mg/dL 0 47*   < > 0 47*   ANION GAP mmol/L 2*   < > 4   CALCIUM mg/dL 8 6   < > 8 7   ALBUMIN g/dL  --   --  2 0*   TOTAL BILIRUBIN mg/dL  --   --  0 32   ALK PHOS U/L  --   --  144*   ALT U/L  --   --  84*   AST U/L  --   --  88*   GLUCOSE RANDOM mg/dL 82   < > 86    < > = values in this interval not displayed  * I Have Reviewed All Lab Data Listed Above  * Additional Pertinent Lab Tests Reviewed:  All Labs Within Last 24 Hours Reviewed        Recent Cultures (last 7 days):           Last 24 Hours Medication List:     Current Facility-Administered Medications:  acetaminophen 975 mg Oral [facility] Wyatt Thomson PA-C   aluminum-magnesium hydroxide-simethicone 30 mL Oral Q4H PRN Wyatt Thomson PA-C   calcium carbonate 1 tablet Oral BID With Meals Wyatt Thomson PA-C   calcium carbonate 500 mg Oral Daily PRN Wyatt Thomson PA-C   cholecalciferol 2,000 Units Oral Daily Wyatt Thomson PA-C   enoxaparin 40 mg Subcutaneous Q24H [facility] Connie Gloria PA-C   folic acid 1 mg Oral Daily Wyatt Thomson PA-C   gabapentin 100 mg Oral TID Wyatt Thomson PA-C   levothyroxine 25 mcg Oral Early Morning Gui Cobb PA-C   lidocaine 2 patch Topical Daily Wyatt Thomson PA-C   LORazepam 2 mg Oral Q8H PRN Wyatt Thomson PA-C   methocarbamol 750 mg Oral Q6H [facility] Wyatt Thomson PA-C   multivitamin-minerals 1 tablet Oral Daily Wyatt Thomson PA-C   oxyCODONE 5 mg Oral Q4H PRN Levi Arrington PA-C   pantoprazole 40 mg Oral BID ALON Valdovinos PA-C   PARoxetine 10 mg Oral Daily Bianca Pearl MD   QUEtiapine 100 mg Oral HS Bianca Pearl MD        Today, Patient Was Seen By: Javier Tompkins MD    ** Please Note: Dictation voice to text software may have been used in the creation of this document   **

## 2020-01-12 NOTE — PLAN OF CARE
Problem: Potential for Falls  Goal: Patient will remain free of falls  Description  INTERVENTIONS:  - Assess patient frequently for physical needs  -  Identify cognitive and physical deficits and behaviors that affect risk of falls    -  [place] fall precautions as indicated by assessment   - Educate patient/family on patient safety including physical limitations  - Instruct patient to call for assistance with activity based on assessment  - Modify environment to reduce risk of injury  - Consider OT/PT consult to assist with strengthening/mobility  Outcome: Progressing     Problem: Prexisting or High Potential for Compromised Skin Integrity  Goal: Skin integrity is maintained or improved  Description  INTERVENTIONS:  - Identify patients at risk for skin breakdown  - Assess and monitor skin integrity  - Assess and monitor nutrition and hydration status  - Monitor labs   - Assess for incontinence   - Turn and reposition patient  - Assist with mobility/ambulation  - Relieve pressure over bony prominences  - Avoid friction and shearing  - Provide appropriate hygiene as needed including keeping skin clean and dry  - Evaluate need for skin moisturizer/barrier cream  - Collaborate with interdisciplinary team   - Patient/family teaching  - Consider wound care consult   Outcome: Progressing     Problem: PAIN - ADULT  Goal: Verbalizes/displays adequate comfort level or baseline comfort level  Description  Interventions:  - Encourage patient to monitor pain and request assistance  - Assess pain using appropriate pain scale  - Administer analgesics based on type and severity of pain and evaluate response  - Implement non-pharmacological measures as appropriate and evaluate response  - Consider cultural and social influences on pain and pain management  - Notify physician/advanced practitioner if interventions unsuccessful or patient reports new pain  Outcome: Progressing     Problem: INFECTION - ADULT  Goal: Absence or prevention of progression during hospitalization  Description  INTERVENTIONS:  - Assess and monitor for signs and symptoms of infection  - Monitor lab/diagnostic results  - Monitor all insertion sites, i e  indwelling lines, tubes, and drains  - Monitor endotracheal if appropriate and nasal secretions for changes in amount and color  - [place] appropriate cooling/warming therapies per order  - Administer medications as ordered  - Instruct and encourage patient and family to use good hand hygiene technique  - Identify and instruct in appropriate isolation precautions for identified infection/condition  Outcome: Progressing  Goal: Absence of fever/infection during neutropenic period  Description  INTERVENTIONS:  - Monitor WBC    Outcome: Progressing     Problem: SAFETY ADULT  Goal: Maintain or return to baseline ADL function  Description  INTERVENTIONS:  -  Assess patient's ability to carry out ADLs; assess patient's baseline for ADL function and identify physical deficits which impact ability to perform ADLs (bathing, care of mouth/teeth, toileting, grooming, dressing, etc )  - Assess/evaluate cause of self-care deficits   - Assess range of motion  - Assess patient's mobility; develop plan if impaired  - Assess patient's need for assistive devices and provide as appropriate  - Encourage maximum independence but intervene and supervise when necessary  - Involve family in performance of ADLs  - Assess for home care needs following discharge   - Consider OT consult to assist with ADL evaluation and planning for discharge  - Provide patient education as appropriate  Outcome: Progressing  Goal: Maintain or return mobility status to optimal level  Description  INTERVENTIONS:  - Assess patient's baseline mobility status (ambulation, transfers, stairs, etc )    - Identify cognitive and physical deficits and behaviors that affect mobility  - Identify mobility aids required to assist with transfers and/or ambulation (gait belt, sit-to-stand, lift, walker, cane, etc )  - [place] fall precautions as indicated by assessment  - Record patient progress and toleration of activity level on Mobility SBAR; progress patient to next Phase/Stage  - Instruct patient to call for assistance with activity based on assessment  - Consider rehabilitation consult to assist with strengthening/weightbearing, etc   Outcome: Progressing     Problem: DISCHARGE PLANNING  Goal: Discharge to home or other facility with appropriate resources  Description  INTERVENTIONS:  - Identify barriers to discharge w/patient and caregiver  - Arrange for needed discharge resources and transportation as appropriate  - Identify discharge learning needs (meds, wound care, etc )  - Arrange for interpretive services to assist at discharge as needed  - Refer to Case Management Department for coordinating discharge planning if the patient needs post-hospital services based on physician/advanced practitioner order or complex needs related to functional status, cognitive ability, or social support system  Outcome: Progressing     Problem: Knowledge Deficit  Goal: Patient/family/caregiver demonstrates understanding of disease process, treatment plan, medications, and discharge instructions  Description  Complete learning assessment and assess knowledge base  Interventions:  - Provide teaching at level of understanding  - Provide teaching via preferred learning methods  Outcome: Progressing     Problem: Nutrition/Hydration-ADULT  Goal: Nutrient/Hydration intake appropriate for improving, restoring or maintaining nutritional needs  Description  Monitor and assess patient's nutrition/hydration status for malnutrition  Collaborate with interdisciplinary team and initiate plan and interventions as ordered  Monitor patient's weight and dietary intake as ordered or per policy  Utilize nutrition screening tool and intervene as necessary   Determine patient's food preferences and provide high-protein, high-caloric foods as appropriate       INTERVENTIONS:  - Monitor oral intake, urinary output, labs, and treatment plans  - Assess nutrition and hydration status and recommend course of action  - Evaluate amount of meals eaten  - Assist patient with eating if necessary   - Allow adequate time for meals  - Recommend/ encourage appropriate diets, oral nutritional supplements, and vitamin/mineral supplements  - Order, calculate, and assess calorie counts as needed  - Recommend, monitor, and adjust tube feedings and TPN/PPN based on assessed needs  - Assess need for intravenous fluids  - Provide specific nutrition/hydration education as appropriate  - Include patient/family/caregiver in decisions related to nutrition  Outcome: Progressing

## 2020-01-12 NOTE — ASSESSMENT & PLAN NOTE
· S/p OR with orthopedics on 1/2   · CT femur R w/o contrast (12/31/19): Obliquely oriented almost nondisplaced proximal femoral shaft fracture around the site of the femoral stem without significant displacement of the hardware  Moderate amount of soft tissue calcification in the vicinity of the injury  Diminished density of the vastus intermedius muscle probably representing some muscular edema in the deep compartment  No dislocation at the hip    · Pain control PRN  · Continue PT/OT  · Continue Gabapentin 100 mg TID  · Discharge planning for inpatient rehab when a bed is available

## 2020-01-13 PROBLEM — S72.001A CLOSED RIGHT HIP FRACTURE (HCC): Status: RESOLVED | Noted: 2019-12-27 | Resolved: 2020-01-13

## 2020-01-13 PROBLEM — M62.82 NON-TRAUMATIC RHABDOMYOLYSIS: Status: RESOLVED | Noted: 2018-08-13 | Resolved: 2020-01-13

## 2020-01-13 LAB
ANION GAP SERPL CALCULATED.3IONS-SCNC: 6 MMOL/L (ref 4–13)
BASOPHILS # BLD AUTO: 0.02 THOUSANDS/ΜL (ref 0–0.1)
BASOPHILS NFR BLD AUTO: 0 % (ref 0–1)
BUN SERPL-MCNC: 5 MG/DL (ref 5–25)
CALCIUM SERPL-MCNC: 8.8 MG/DL (ref 8.3–10.1)
CHLORIDE SERPL-SCNC: 104 MMOL/L (ref 100–108)
CO2 SERPL-SCNC: 29 MMOL/L (ref 21–32)
CREAT SERPL-MCNC: 0.42 MG/DL (ref 0.6–1.3)
EOSINOPHIL # BLD AUTO: 0.06 THOUSAND/ΜL (ref 0–0.61)
EOSINOPHIL NFR BLD AUTO: 1 % (ref 0–6)
ERYTHROCYTE [DISTWIDTH] IN BLOOD BY AUTOMATED COUNT: 22.8 % (ref 11.6–15.1)
GFR SERPL CREATININE-BSD FRML MDRD: 113 ML/MIN/1.73SQ M
GLUCOSE SERPL-MCNC: 89 MG/DL (ref 65–140)
HCT VFR BLD AUTO: 25.2 % (ref 34.8–46.1)
HGB BLD-MCNC: 7.8 G/DL (ref 11.5–15.4)
IMM GRANULOCYTES # BLD AUTO: 0.07 THOUSAND/UL (ref 0–0.2)
IMM GRANULOCYTES NFR BLD AUTO: 1 % (ref 0–2)
LYMPHOCYTES # BLD AUTO: 0.63 THOUSANDS/ΜL (ref 0.6–4.47)
LYMPHOCYTES NFR BLD AUTO: 11 % (ref 14–44)
MCH RBC QN AUTO: 30.5 PG (ref 26.8–34.3)
MCHC RBC AUTO-ENTMCNC: 31 G/DL (ref 31.4–37.4)
MCV RBC AUTO: 98 FL (ref 82–98)
MONOCYTES # BLD AUTO: 0.34 THOUSAND/ΜL (ref 0.17–1.22)
MONOCYTES NFR BLD AUTO: 6 % (ref 4–12)
NEUTROPHILS # BLD AUTO: 4.4 THOUSANDS/ΜL (ref 1.85–7.62)
NEUTS SEG NFR BLD AUTO: 81 % (ref 43–75)
NRBC BLD AUTO-RTO: 1 /100 WBCS
PLATELET # BLD AUTO: 340 THOUSANDS/UL (ref 149–390)
PMV BLD AUTO: 8.7 FL (ref 8.9–12.7)
POTASSIUM SERPL-SCNC: 4 MMOL/L (ref 3.5–5.3)
RBC # BLD AUTO: 2.56 MILLION/UL (ref 3.81–5.12)
SODIUM SERPL-SCNC: 139 MMOL/L (ref 136–145)
WBC # BLD AUTO: 5.52 THOUSAND/UL (ref 4.31–10.16)

## 2020-01-13 PROCEDURE — 80048 BASIC METABOLIC PNL TOTAL CA: CPT | Performed by: INTERNAL MEDICINE

## 2020-01-13 PROCEDURE — 85025 COMPLETE CBC W/AUTO DIFF WBC: CPT | Performed by: INTERNAL MEDICINE

## 2020-01-13 PROCEDURE — 99239 HOSP IP/OBS DSCHRG MGMT >30: CPT | Performed by: PHYSICIAN ASSISTANT

## 2020-01-13 PROCEDURE — 97535 SELF CARE MNGMENT TRAINING: CPT

## 2020-01-13 PROCEDURE — 97116 GAIT TRAINING THERAPY: CPT

## 2020-01-13 RX ORDER — LORAZEPAM 2 MG/1
2 TABLET ORAL EVERY 8 HOURS PRN
Qty: 20 TABLET | Refills: 0 | Status: SHIPPED | OUTPATIENT
Start: 2020-01-13 | End: 2020-01-20

## 2020-01-13 RX ORDER — OXYCODONE HCL 5 MG/5 ML
5 SOLUTION, ORAL ORAL EVERY 4 HOURS PRN
Qty: 30 ML | Refills: 0 | Status: SHIPPED | OUTPATIENT
Start: 2020-01-13 | End: 2020-01-23

## 2020-01-13 RX ORDER — QUETIAPINE FUMARATE 100 MG/1
100 TABLET, FILM COATED ORAL
Refills: 0
Start: 2020-01-13 | End: 2020-01-27 | Stop reason: CLARIF

## 2020-01-13 RX ORDER — PAROXETINE 10 MG/1
10 TABLET, FILM COATED ORAL DAILY
Refills: 0
Start: 2020-01-14 | End: 2020-03-24 | Stop reason: SDUPTHER

## 2020-01-13 RX ADMIN — LEVOTHYROXINE SODIUM 25 MCG: 25 TABLET ORAL at 05:19

## 2020-01-13 RX ADMIN — OXYCODONE HYDROCHLORIDE 5 MG: 5 SOLUTION ORAL at 15:10

## 2020-01-13 RX ADMIN — PAROXETINE 10 MG: 20 TABLET, FILM COATED ORAL at 09:03

## 2020-01-13 RX ADMIN — GABAPENTIN 100 MG: 100 CAPSULE ORAL at 21:06

## 2020-01-13 RX ADMIN — ACETAMINOPHEN 975 MG: 325 TABLET ORAL at 05:19

## 2020-01-13 RX ADMIN — PANTOPRAZOLE SODIUM 40 MG: 20 TABLET, DELAYED RELEASE ORAL at 05:19

## 2020-01-13 RX ADMIN — Medication 1 TABLET: at 09:03

## 2020-01-13 RX ADMIN — ACETAMINOPHEN 975 MG: 325 TABLET ORAL at 21:06

## 2020-01-13 RX ADMIN — ACETAMINOPHEN 975 MG: 325 TABLET ORAL at 13:15

## 2020-01-13 RX ADMIN — ENOXAPARIN SODIUM 40 MG: 40 INJECTION SUBCUTANEOUS at 13:15

## 2020-01-13 RX ADMIN — METHOCARBAMOL 750 MG: 750 TABLET, FILM COATED ORAL at 05:19

## 2020-01-13 RX ADMIN — LIDOCAINE 2 PATCH: 50 PATCH CUTANEOUS at 09:04

## 2020-01-13 RX ADMIN — LORAZEPAM 2 MG: 1 TABLET ORAL at 09:03

## 2020-01-13 RX ADMIN — OXYCODONE HYDROCHLORIDE 5 MG: 5 SOLUTION ORAL at 11:07

## 2020-01-13 RX ADMIN — METHOCARBAMOL 750 MG: 750 TABLET, FILM COATED ORAL at 00:03

## 2020-01-13 RX ADMIN — METHOCARBAMOL 750 MG: 750 TABLET, FILM COATED ORAL at 17:00

## 2020-01-13 RX ADMIN — PANTOPRAZOLE SODIUM 40 MG: 20 TABLET, DELAYED RELEASE ORAL at 15:10

## 2020-01-13 RX ADMIN — METHOCARBAMOL 750 MG: 750 TABLET, FILM COATED ORAL at 13:15

## 2020-01-13 RX ADMIN — FOLIC ACID 1 MG: 1 TABLET ORAL at 09:04

## 2020-01-13 RX ADMIN — CALCIUM 1 TABLET: 500 TABLET ORAL at 07:35

## 2020-01-13 RX ADMIN — MELATONIN 2000 UNITS: at 09:04

## 2020-01-13 RX ADMIN — GABAPENTIN 100 MG: 100 CAPSULE ORAL at 09:04

## 2020-01-13 RX ADMIN — CALCIUM 1 TABLET: 500 TABLET ORAL at 16:58

## 2020-01-13 RX ADMIN — METHOCARBAMOL 750 MG: 750 TABLET, FILM COATED ORAL at 23:53

## 2020-01-13 RX ADMIN — QUETIAPINE FUMARATE 100 MG: 100 TABLET ORAL at 21:06

## 2020-01-13 RX ADMIN — GABAPENTIN 100 MG: 100 CAPSULE ORAL at 15:10

## 2020-01-13 NOTE — OCCUPATIONAL THERAPY NOTE
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[employer] Enjoys spending time with her cat   Pain Assessment   Pain Assessment 0-10   Pain Score 7   Pain Type Acute pain   Pain Location Hip   Pain Orientation Right   ADL   Where Assessed Chair   LB Dressing Assistance 4  Minimal Assistance   LB Dressing Deficit Don/doff R sock; Don/doff L sock; Thread RLE into pants; Thread LLE into pants; Thread RLE into underwear; Thread LLE into underwear;Pull up over hips   LB Dressing Comments practice using LHAE    Transfers   Sit to Stand 4  Minimal assistance   Additional items Assist x 1   Stand to Sit 4  Minimal assistance   Additional items Assist x 1   Functional Mobility   Functional Mobility 4  Minimal assistance   Additional items Rolling walker   Cognition   Overall Cognitive Status Impaired   Arousal/Participation Arousable;Responsive   Attention Attends with cues to redirect   Orientation Level Oriented X4   Memory Decreased recall of precautions   Following Commands Follows one step commands without difficulty   Activity Tolerance   Activity Tolerance Patient tolerated treatment well   Assessment   Assessment Pt participated in occupational therapy with focus on activity tolerance, LB self-care/dressing with Long handle adaptive equipment, functional transfers/mob and standing tolerance and balance     Pt cleared by FRANCIA/Shen for pt participation in occupational therapy  Pt received sitting out of bed to bedside chair and agreeable to therapy following pt Identifiers confirmed  Pt reported her goal today to return to home  Pt requires assist for LB self-care/dressing with long handle adaptive equipment 2* pt limited by strength and R LE hip pain  Pt will require in-pt rehab to continue to address pt above noted deficits which currently impair pt ADL and functional mob     Plan   Goal Expiration Date 01/20/20   OT Treatment Day 3   OT Frequency 3-5x/wk   Recommendation   OT Discharge Recommendation Short Term Rehab   Barthel Index   Feeding 10   Bathing 0   Grooming Score 5   Dressing Score 5   Bladder Score 10   Bowels Score 10   Toilet Use Score 5   Transfers (Bed/Chair) Score 5   Mobility (Level Surface) Score 0   Stairs Score 0   Barthel Index Score 50   Modified Nash Scale   Modified Nash Scale 4       Leighton BARRIENTOS/MARVA

## 2020-01-13 NOTE — ASSESSMENT & PLAN NOTE
· S/p OR with orthopedics on 1/2/20  · CT femur R w/o contrast (12/31/19): Obliquely oriented almost nondisplaced proximal femoral shaft fracture around the site of the femoral stem without significant displacement of the hardware  Moderate amount of soft tissue calcification in the vicinity of the injury  Diminished density of the vastus intermedius muscle probably representing some muscular edema in the deep compartment  No dislocation at the hip    · Pain control PRN  · Continue PT/OT  · Continue Gabapentin 100 mg TID  · Discharge planning for inpatient rehab when a bed is available

## 2020-01-13 NOTE — SOCIAL WORK
DC plannin hrs:   Per CORY Arroyo, neuro-psych determined that the pt has capacity  CM met with the patient to discuss dc plan preferences  Pt is agreeable to [facility], is requesting [facility] transport and stated she is aware of cost for transport  CM called and spoke with Lety at [facility]; she requested updated OT notes for rehab auth submission  CM requested updated notes from [facility]  1230 hrs:   PT Gretchen advised that updated OT notes are available in chart  CM called and notified Lety at [facility]  CM awaiting rehab auth notification from [facility] for transport set up  FRANCIA Ashley and CORY Arroyo advised  1608 hrs:   CM received call from Lety at [facility] reporting that Rehab authorization has been received  CM called and spoke with Lennie Romberg at [facility]; [facility] transport set for tomorrow  [address]  Pt to dc with scripts for Ativan and Roxicodone; copy of scripts ECIN'd to [facility] for pharmacy planning  CM met with the patient, advised her of dc tomorrow and and med scripts for [facility] at [facility]  The patient denied any other dc needs at this time  CM entered facility and transport contact information into Epic  DC packet placed in pt's chart folder and FRANCIA Caberra advised

## 2020-01-13 NOTE — ASSESSMENT & PLAN NOTE
· Home regimen consists of 400 mg Seroquel at bedtime, Geodon 80 mg BID and Ativan p r n  · Patient was transferred to ICU for suspected NMS  This has been treated  · Geodon has been discontinued indefinitely  · Continue Paxil and Seroquel at current dose  · SNF is requesting a competency evaluation  Evaluation completed and patient is competent

## 2020-01-13 NOTE — RESTORATIVE TECHNICIAN NOTE
Restorative Specialist Mobility Note       Activity: Ambulate in solorio, Ambulate in room, Chair, [address] privileges, Dangle, Stand at bedside(Educated/encouraged pt to ambulate with assistance 3-4 x's/day  Chair alarm on   Pt callbell, phone/tray within reach )     Assistive Device: Front wheel walker    Charito Rajan BS, Restorative Technician, [employer]

## 2020-01-13 NOTE — CONSULTS
Consultation - Neuropsychology/Psychology Department  Lavon Lopez 61 y o  female MRN: [id number]  Unit/Bed#: -82 Encounter: [id number]        Reason for Consultation:  Lavon Lopez is a 61y o  year old female who was referred for a Neuropsychological Exam to assess cognitive functioning and comment on capacity to make informed medical decisions  History of Present Illness  Ambulatory dysfunction and right hip pain    Physician Requesting Consult: Lida Harada, MD    PROBLEM LIST:  Patient Active Problem List   Diagnosis    Lumbar radiculopathy    DDD (degenerative disc disease), lumbar    Spondylolisthesis at L4-L5 level    Localized osteoporosis with current pathological fracture with routine healing    Spinal stenosis of lumbar region with neurogenic claudication    Lumbar spondylosis    T12 compression fracture (HCC)    Synovial cyst of lumbar facet joint    Anxiety    Bulimia nervosa in remission    Constipation    Acquired hypothyroidism    Other fatigue    Serotonin syndrome    Schizoaffective disorder, depressive type ([address] )    ABIMAEL (generalized anxiety disorder)    Dermal hypersensitivity reaction    Elevated BP without diagnosis of hypertension    Essential hypertension    Right hip pain    Chronic bilateral low back pain without sciatica    Iron deficiency anemia secondary to inadequate dietary iron intake    NMS (neuroleptic malignant syndrome)         Historical Information   Past Medical History:   Diagnosis Date    Anxiety     Back pain at L4-L5 level     Schreiber esophagus     Bulimia     Disease of thyroid gland     hypothyroid    GERD (gastroesophageal reflux disease)     Hyperlipidemia     Hypothyroidism     Leukopenia     NMS (neuroleptic malignant syndrome) 01/03/2020    Rheumatoid arthritis involving right hip (HCC)     Sciatica     Seizure ([address] )     due to medication mix up 8/2018 only one historically    Synovial cyst of lumbar spine     Vertigo     Weight gain      Past Surgical History:   Procedure Laterality Date    BONE MARROW BIOPSY      BREAST SURGERY      enlargement     COLONOSCOPY      HIP ARTHROPLASTY Right 1/2/2020    Procedure: REVISION TOTAL HIP ARTHROPLASTY WITH REPAIR OF PARIPROSTHETIC FRACTURE - POSTERIOR APPROACH;  Surgeon: Veronica Cortez MD;  [facility];  Service: Orthopedics    [place] TOTAL HIP ARTHROPLASTY Right 12/11/2019    Procedure: ARTHROPLASTY HIP TOTAL ANTERIOR;  Surgeon: Veronica Cortez MD;  [facility];  Service: Orthopedics    RHINOPLASTY       Social History   Social History     Substance and Sexual Activity   Alcohol Use Never    Frequency: Never    Binge frequency: Never     Social History     Substance and Sexual Activity   Drug Use No     Social History     Tobacco Use   Smoking Status Never Smoker   Smokeless Tobacco Never Used     Family History:   Family History   Problem Relation Age of Onset    Uterine cancer Mother     Esophageal cancer Father     Colon cancer Maternal Grandmother        Meds/Allergies   current meds:   Current Facility-Administered Medications   Medication Dose Route Frequency    acetaminophen (TYLENOL) tablet 975 mg  975 mg Oral Q8H [address]    aluminum-magnesium hydroxide-simethicone (MYLANTA) 200-200-20 mg/5 mL oral suspension 30 mL  30 mL Oral Q4H PRN    calcium carbonate (OYSTER SHELL,OSCAL) 500 mg tablet 1 tablet  1 tablet Oral BID With Meals    calcium carbonate (TUMS) chewable tablet 500 mg  500 mg Oral Daily PRN    cholecalciferol (VITAMIN D3) tablet 2,000 Units  2,000 Units Oral Daily    enoxaparin (LOVENOX) subcutaneous injection 40 mg  40 mg Subcutaneous I03A YUMIKO    folic acid (FOLVITE) tablet 1 mg  1 mg Oral Daily    gabapentin (NEURONTIN) capsule 100 mg  100 mg Oral TID    levothyroxine tablet 25 mcg  25 mcg Oral Early Morning    lidocaine (LIDODERM) 5 % patch 2 patch  2 patch Topical Daily    LORazepam (ATIVAN) tablet 2 mg  2 mg Oral Q8H PRN    methocarbamol (ROBAXIN) tablet 750 mg  750 mg Oral Q6H [address]    multivitamin-minerals (CENTRUM) tablet 1 tablet  1 tablet Oral Daily    oxyCODONE (ROXICODONE) oral solution 5 mg  5 mg Oral Q4H PRN    pantoprazole (PROTONIX) EC tablet 40 mg  40 mg Oral BID AC    PARoxetine (PAXIL) tablet 10 mg  10 mg Oral Daily    QUEtiapine (SEROquel) tablet 100 mg  100 mg Oral HS       Allergies   Allergen Reactions    Risperdal [Risperidone] Shortness Of Breath     Rapid heart beat, SOB    Zyprexa [Olanzapine] Shortness Of Breath     Rapid heartbeat    Latex Rash     Band aids, adhesives wears normal underwear, can eat bananas  USE PAPER TAPE         Family and Social Support:   Freedom of Choice: Yes  IMM Given (Date):: 01/13/20  IMM Given to[de-identified] Patient  Transportation at Discharge?: Yes  CM Handoff Comments: [facility] [employer] Observations: Alert, oriented x 3, cooperative, affect appeared appropriate to content; patient denied depressed mood and anxiety; no overt evidence of psychotic process and patient denied auditory and visual hallucinations; no indication of language pathology or difficulty with recall during conversational speech  Sleep has been interrupted and appetite adequate  Patient denied any changes in cognitive functioning  Patient reported she will be participating in rehabilitation due to current medical difficulty  Cognitive Examination    General Cognitive Functioning MMSE = Average 28/28; General Fund of Information = Average    Attention/Concentration Auditory Selective Attention = Average; Auditory Vigilance = Average; Information Processing Speed = Average    Frontal Systems/Executive Functioning Mental Flexibility/Cognitive Control = Average;  Working Memory = Average Abstract Reasoning = Average; Generative Ability = Average, Commonsense Reasoning and Judgement = Average    Language Functioning Confrontation naming = Average, Phonemic Fluency = Average; Semantic Retrieval = Average; Comprehension of Complex Ideational Material = Average;  Praxis = Within Normal Limits; Repetition = Within Normal Limits; Basic Reading = Within Normal Limits; Following Commands = Within Normal Limits    Memory Functioning Narrative Recall - Short Delay = Average; Long Delay Narrative Recall = Average;     Visuo-Spatial Abilities Not Assessed    Functional Knowledge  Health & Safety Knowledge = Average;     Summary/Impression: At the time of this exam there were NO deficits in cognitive functioning; affect appeared appropriate and thoughts appeared logical and coherent  On a measure assessing awareness of personal health status and ability to evaluate health problems, handle medical emergencies and take safety precautions, patient performed within normal limits   At this time, patient appears to have capacity to make informed medical decisions    Recommendations:

## 2020-01-13 NOTE — PHYSICAL THERAPY NOTE
Physical Therapy Treatment Note       01/13/20 0900   Pain Assessment   Pain Assessment 0-10   Pain Score 7   Pain Type Acute pain;Surgical pain   Pain Location Hip   Pain Orientation Right   Patient's Stated Pain Goal No pain   Hospital Pain Intervention(s) Ambulation/increased activity   Response to Interventions unchanged   Restrictions/Precautions   Weight Bearing Precautions Per Order Yes   RLE Weight Bearing Per Order WBAT   Other Precautions THR; Cognitive; Chair Alarm; Bed Alarm;Multiple lines; Fall Risk;Pain   General   Chart Reviewed Yes   Family/Caregiver Present No   Cognition   Overall Cognitive Status Impaired   Arousal/Participation Responsive   Attention Attends with cues to redirect   Orientation Level Oriented X4   Memory Unable to assess   Following Commands Follows one step commands without difficulty   Subjective   Subjective states she feels OK, continued pain, and some fatigue p gait   Transfers   Sit to Stand 4  Minimal assistance   Additional items Assist x 1   Stand to Sit 4  Minimal assistance   Additional items Assist x 1   Ambulation/Elevation   Gait pattern   (slow, antalgic, short step length)   Gait Assistance 4  Minimal assist   Additional items Assist x 1   Assistive Device Rolling walker   Distance 120'x2, standing rest x 2 x 2-3 min each  repositioned to comfort in chair p session   Balance   Static Sitting Normal   Dynamic Sitting Good   Static Standing Fair   Dynamic Standing Fair -   Ambulatory Fair -   Endurance Deficit   Endurance Deficit Yes   Endurance Deficit Description weakness, fatigue, pain   Activity Tolerance   Activity Tolerance Patient limited by fatigue;Patient limited by pain;Treatment limited secondary to medical complications (Comment)   Nurse Made Aware yes   Assessment   Prognosis Good   Problem List Decreased strength;Decreased endurance; Impaired balance;Decreased mobility; Decreased cognition;Decreased safety awareness; Impaired judgement;Pain;Orthopedic restrictions   Assessment Pt seen for sesssion  for setup, transfers, gait w/ rest time, repositioning  Pt cooperative, willing to mobilize  continued c/o pain, and notes SOB  able to ambulate an increased distance, w/ continued less overall assist   remains appropriate for rehab at d/c   Goals   Patient Goals to go to rehab   [facility] Expiration Date 01/19/20   PT Treatment Day 5   Plan   Treatment/Interventions Functional transfer training;LE strengthening/ROM; Endurance training; Therapeutic exercise;Patient/family training;Equipment eval/education; Bed mobility;Gait training   Progress Progressing toward goals   PT Frequency   (3-5x/wk)   Recommendation   Recommendation Post acute IP rehab   Equipment Recommended Walker   PT - OK to Discharge Yes  (when stable to rehab)   Bhavana Canales PT, DPT CSRS

## 2020-01-13 NOTE — ASSESSMENT & PLAN NOTE
· Was transferred to ICU for suspected NMS and treated for this, this occurred post-operatively with rigidity, tremors, hyperthermia, tachycardia, delirium   · Geodon has been discontinued indefinitely    DO NOT RESTART AT DISCHARGE    · Continue Seroquel at 100 mg HS  · Completed Valium taper  · Paxil restarted by Psychiatry

## 2020-01-13 NOTE — PLAN OF CARE
Problem: OCCUPATIONAL THERAPY ADULT  Goal: Performs self-care activities at highest level of function for planned discharge setting  See evaluation for individualized goals  Description  Treatment Interventions: ADL retraining, Functional transfer training, Endurance training, Patient/family training, Equipment evaluation/education, Compensatory technique education, Activityengagement          See flowsheet documentation for full assessment, interventions and recommendations  Outcome: Progressing  Note:   Limitation: Decreased ADL status, Decreased cognition, Decreased self-care trans, Decreased high-level ADLs  Prognosis: Fair  Assessment: Pt participated in occupational therapy with focus on activity tolerance, LB self-care/dressing with Long handle adaptive equipment, functional transfers/mob and standing tolerance and balance  Pt cleared by FRANCIA/Shen for pt participation in occupational therapy  Pt received sitting out of bed to bedside chair and agreeable to therapy following pt Identifiers confirmed  Pt reported her goal today to return to home  Pt requires assist for LB self-care/dressing with long handle adaptive equipment 2* pt limited by strength and R LE hip pain  Pt will require in-pt rehab to continue to address pt above noted deficits which currently impair pt ADL and functional mob       OT Discharge Recommendation: Short Term Rehab  OT - OK to Discharge: Yes(When medically appropriate to rehab)

## 2020-01-13 NOTE — SOCIAL WORK
CM contacted [facility] [address] [phone] and left a voicemail for follow up regarding determination letter from December

## 2020-01-13 NOTE — PROGRESS NOTES
Progress Note - Marta Brannon 1960, 61 y o  female MRN: [id number]  Unit/Bed#: -01 Encounter: [id number]  Primary Care Provider: Elenita Russo MD   Date and time admitted to hospital: 12/27/2019 10:59 AM    * Closed right hip fracture [facility])  Assessment & Plan  · S/p OR with orthopedics on 1/2/20  · CT femur R w/o contrast (12/31/19): Obliquely oriented almost nondisplaced proximal femoral shaft fracture around the site of the femoral stem without significant displacement of the hardware  Moderate amount of soft tissue calcification in the vicinity of the injury  Diminished density of the vastus intermedius muscle probably representing some muscular edema in the deep compartment  No dislocation at the hip  · Pain control PRN  · Continue PT/OT  · Continue Gabapentin 100 mg TID  · Discharge planning for inpatient rehab when a bed is available    NMS (neuroleptic malignant syndrome)  Assessment & Plan  · Was transferred to ICU for suspected NMS and treated for this, this occurred post-operatively with rigidity, tremors, hyperthermia, tachycardia, delirium   · Geodon has been discontinued indefinitely  DO NOT RESTART AT DISCHARGE    · Continue Seroquel at 100 mg HS  · Completed Valium taper  · Paxil restarted by Psychiatry    Iron deficiency anemia secondary to inadequate dietary iron intake  Assessment & Plan  · Continue iron supplementation  · Hgb has been stable   · Monitor CBC     Chronic bilateral low back pain without sciatica  Assessment & Plan  · Continue Tylenol, oxycodone, dilaudid for breakthrough  · Gabapentin t i d  · PT/OT    ABIMAEL (generalized anxiety disorder)  Assessment & Plan  · Continue psychiatric medications as above    Schizoaffective disorder, depressive type (HCC)  Assessment & Plan  · Home regimen consists of 400 mg Seroquel at bedtime, Geodon 80 mg BID and Ativan p r n  · Patient was transferred to ICU for suspected NMS  This has been treated    · Geodon has been discontinued indefinitely  · Continue Paxil and Seroquel at current dose  · SNF is requesting a competency evaluation  Evaluation completed and patient is competent  Acquired hypothyroidism  Assessment & Plan  · Continue daily levothyroxine    Localized osteoporosis with current pathological fracture with routine healing  Assessment & Plan  · Vitamin-D supplementation  · Outpatient endocrine follow-up    Non-traumatic rhabdomyolysisresolved as of 2020  Assessment & Plan  · Suspected 2/2 NMS   · Resolved      VTE Pharmacologic Prophylaxis:   Pharmacologic: Enoxaparin (Lovenox)  Mechanical: Mechanical VTE prophylaxis in place  Patient Centered Rounds: I have performed bedside rounds with nursing staff today  Discussions with Specialists or Other Care Team Provider: Dr Augie Gutierrez  Education and Discussions with Family / Patient: All patient questions answered to the best of my ability  Time Spent for Care: 20 minutes  More than 50% of total time spent on counseling and coordination of care as described above  Current Length of Stay: 15 day(s)  Current Patient Status: Inpatient   Certification Statement: The patient will continue to require additional inpatient hospital stay due to rehab placement  Discharge Plan: Patient is medically stable for discharge once rehab has been secured  Code Status: Level 1 - Full Code    Subjective:   Patient is doing well overall with no significant complaints  She did ambulate to the elevators and back this morning and feels a little tired since that time  Her pain is well controlled  Objective:   Vitals:   Temp (24hrs), Av °F (36 7 °C), Min:98 °F (36 7 °C), Max:98 °F (36 7 °C)    Temp:  [98 °F (36 7 °C)] 98 °F (36 7 °C)  HR:  [85-90] 85  Resp:  [16-20] 20  BP: (107-123)/(63-74) 123/74  SpO2:  [98 %-100 %] 100 %  Body mass index is 29 13 kg/m²  Input and Output Summary (last 24 hours):        Intake/Output Summary (Last 24 hours) at 2020 [address] [place] [address] filed at 1/13/2020 1100  Gross per 24 hour   Intake 1200 ml   Output 3500 ml   Net -2300 ml       Physical Exam:     Physical Exam   HENT:   Head: Normocephalic and atraumatic  Mouth/Throat: Oropharynx is clear and moist and mucous membranes are normal    Eyes: No scleral icterus  Cardiovascular: Normal rate and regular rhythm  No murmur heard  Pulmonary/Chest: Breath sounds normal  She has no wheezes  She has no rales  She exhibits no tenderness  Abdominal: Soft  Bowel sounds are normal  She exhibits no distension  There is no tenderness  Musculoskeletal: Normal range of motion  She exhibits no edema  Skin: Skin is warm and dry  No rash noted  Psychiatric: She has a normal mood and affect  Vitals reviewed  Additional Data:   Labs:  Results from last 7 days   Lab Units 01/13/20  0500   WBC Thousand/uL 5 52   HEMOGLOBIN g/dL 7 8*   HEMATOCRIT % 25 2*   PLATELETS Thousands/uL 340   NEUTROS PCT % 81*   LYMPHS PCT % 11*   MONOS PCT % 6   EOS PCT % 1     Results from last 7 days   Lab Units 01/13/20  0500  01/07/20  0509   POTASSIUM mmol/L 4 0   < > 3 8   CHLORIDE mmol/L 104   < > 101   CO2 mmol/L 29   < > 31   BUN mg/dL 5   < > 5   CREATININE mg/dL 0 42*   < > 0 47*   CALCIUM mg/dL 8 8   < > 8 7   ALK PHOS U/L  --   --  144*   ALT U/L  --   --  84*   AST U/L  --   --  88*    < > = values in this interval not displayed  * I Have Reviewed All Lab Data Listed Above  * Additional Pertinent Lab Tests Reviewed: All Labs Within Last 24 Hours Reviewed    Imaging:    Imaging Reports Reviewed Today Include: None new    Cultures:   Blood Culture:   Lab Results   Component Value Date    BLOODCX No Growth After 5 Days  01/03/2020    BLOODCX No Growth After 5 Days  01/03/2020    BLOODCX No Growth After 5 Days   08/13/2018    BLOODCX Staphylococcus coagulase negative (A) 08/13/2018     Urine Culture:   Lab Results   Component Value Date    URINECX 70,000-79,000 cfu/ml Mixed Contaminants X5 09/24/2016     Sputum Culture: No components found for: SPUTUMCX  Wound Culture: No results found for: WOUNDCULT    Last 24 Hours Medication List:     Current Facility-Administered Medications:  acetaminophen 975 mg Oral Q8H [address] Woodroe Stammer, PAVALERIA   aluminum-magnesium hydroxide-simethicone 30 mL Oral Q4H PRN Woodroe Stammer, PA-C   calcium carbonate 1 tablet Oral BID With Meals Woodroe Stammer, PA-C   calcium carbonate 500 mg Oral Daily PRN Woodroe Stammer, PA-C   cholecalciferol 2,000 Units Oral Daily Woodroe Stammer, PA-C   enoxaparin 40 mg Subcutaneous Q24H [address] Connie Gloria, CORY   folic acid 1 mg Oral Daily Woodroe Stammer, PA-WAYNE   gabapentin 100 mg Oral TID Woodroe Stammer, PA-C   levothyroxine 25 mcg Oral Early Morning Gui Cobb, CORY   lidocaine 2 patch Topical Daily Woodroe Stammer, PA-WAYNE   LORazepam 2 mg Oral Q8H PRN Woodroe Stammer, PA-WAYNE   methocarbamol 750 mg Oral Q6H [address] Woodroe Stammer, PAVALERIA   multivitamin-minerals 1 tablet Oral Daily Woodroe Stammer, PA-WAYNE   oxyCODONE 5 mg Oral Q4H PRN Patrica Bake, PA-WAYNE   pantoprazole 40 mg Oral BID AC Woodroe Stammer, PAJonathanC   PARoxetine 10 mg Oral Daily Li Mason MD   QUEtiapine 100 mg Oral HS Li Mason MD        Today, Patient Was Seen By: Tucker Arenas PA-C    ** Please Note: Dragon 360 Dictation voice to text software may have been used in the creation of this document   **

## 2020-01-13 NOTE — PLAN OF CARE
Problem: PHYSICAL THERAPY ADULT  Goal: Performs mobility at highest level of function for planned discharge setting  See evaluation for individualized goals  Outcome: Progressing  Note:   Prognosis: Good  Problem List: Decreased strength, Decreased endurance, Impaired balance, Decreased mobility, Decreased cognition, Decreased safety awareness, Impaired judgement, Pain, Orthopedic restrictions  Assessment: Pt seen for sesssion  for setup, transfers, gait w/ rest time, repositioning  Pt cooperative, willing to mobilize  continued c/o pain, and notes SOB  able to ambulate an increased distance, w/ continued less overall assist   remains appropriate for rehab at d/c  Barriers to Discharge: Inaccessible home environment, Decreased caregiver support     Recommendation: Post acute IP rehab     PT - OK to Discharge: (S) Yes(when stable to rehab)    See flowsheet documentation for full assessment

## 2020-01-13 NOTE — PLAN OF CARE
Problem: Potential for Falls  Goal: Patient will remain free of falls  Description  INTERVENTIONS:  - Assess patient frequently for physical needs  -  Identify cognitive and physical deficits and behaviors that affect risk of falls    -  [place] fall precautions as indicated by assessment   - Educate patient/family on patient safety including physical limitations  - Instruct patient to call for assistance with activity based on assessment  - Modify environment to reduce risk of injury  - Consider OT/PT consult to assist with strengthening/mobility  Outcome: Progressing     Problem: Prexisting or High Potential for Compromised Skin Integrity  Goal: Skin integrity is maintained or improved  Description  INTERVENTIONS:  - Identify patients at risk for skin breakdown  - Assess and monitor skin integrity  - Assess and monitor nutrition and hydration status  - Monitor labs   - Assess for incontinence   - Turn and reposition patient  - Assist with mobility/ambulation  - Relieve pressure over bony prominences  - Avoid friction and shearing  - Provide appropriate hygiene as needed including keeping skin clean and dry  - Evaluate need for skin moisturizer/barrier cream  - Collaborate with interdisciplinary team   - Patient/family teaching  - Consider wound care consult   Outcome: Progressing     Problem: PAIN - ADULT  Goal: Verbalizes/displays adequate comfort level or baseline comfort level  Description  Interventions:  - Encourage patient to monitor pain and request assistance  - Assess pain using appropriate pain scale  - Administer analgesics based on type and severity of pain and evaluate response  - Implement non-pharmacological measures as appropriate and evaluate response  - Consider cultural and social influences on pain and pain management  - Notify physician/advanced practitioner if interventions unsuccessful or patient reports new pain  Outcome: Progressing     Problem: INFECTION - ADULT  Goal: Absence or prevention of progression during hospitalization  Description  INTERVENTIONS:  - Assess and monitor for signs and symptoms of infection  - Monitor lab/diagnostic results  - Monitor all insertion sites, i e  indwelling lines, tubes, and drains  - Monitor endotracheal if appropriate and nasal secretions for changes in amount and color  - [place] appropriate cooling/warming therapies per order  - Administer medications as ordered  - Instruct and encourage patient and family to use good hand hygiene technique  - Identify and instruct in appropriate isolation precautions for identified infection/condition  Outcome: Progressing  Goal: Absence of fever/infection during neutropenic period  Description  INTERVENTIONS:  - Monitor WBC    Outcome: Progressing     Problem: SAFETY ADULT  Goal: Maintain or return to baseline ADL function  Description  INTERVENTIONS:  -  Assess patient's ability to carry out ADLs; assess patient's baseline for ADL function and identify physical deficits which impact ability to perform ADLs (bathing, care of mouth/teeth, toileting, grooming, dressing, etc )  - Assess/evaluate cause of self-care deficits   - Assess range of motion  - Assess patient's mobility; develop plan if impaired  - Assess patient's need for assistive devices and provide as appropriate  - Encourage maximum independence but intervene and supervise when necessary  - Involve family in performance of ADLs  - Assess for home care needs following discharge   - Consider OT consult to assist with ADL evaluation and planning for discharge  - Provide patient education as appropriate  Outcome: Progressing  Goal: Maintain or return mobility status to optimal level  Description  INTERVENTIONS:  - Assess patient's baseline mobility status (ambulation, transfers, stairs, etc )    - Identify cognitive and physical deficits and behaviors that affect mobility  - Identify mobility aids required to assist with transfers and/or ambulation (gait belt, sit-to-stand, lift, walker, cane, etc )  - [place] fall precautions as indicated by assessment  - Record patient progress and toleration of activity level on Mobility SBAR; progress patient to next Phase/Stage  - Instruct patient to call for assistance with activity based on assessment  - Consider rehabilitation consult to assist with strengthening/weightbearing, etc   Outcome: Progressing     Problem: DISCHARGE PLANNING  Goal: Discharge to home or other facility with appropriate resources  Description  INTERVENTIONS:  - Identify barriers to discharge w/patient and caregiver  - Arrange for needed discharge resources and transportation as appropriate  - Identify discharge learning needs (meds, wound care, etc )  - Arrange for interpretive services to assist at discharge as needed  - Refer to Case Management Department for coordinating discharge planning if the patient needs post-hospital services based on physician/advanced practitioner order or complex needs related to functional status, cognitive ability, or social support system  Outcome: Progressing     Problem: Knowledge Deficit  Goal: Patient/family/caregiver demonstrates understanding of disease process, treatment plan, medications, and discharge instructions  Description  Complete learning assessment and assess knowledge base  Interventions:  - Provide teaching at level of understanding  - Provide teaching via preferred learning methods  Outcome: Progressing     Problem: Nutrition/Hydration-ADULT  Goal: Nutrient/Hydration intake appropriate for improving, restoring or maintaining nutritional needs  Description  Monitor and assess patient's nutrition/hydration status for malnutrition  Collaborate with interdisciplinary team and initiate plan and interventions as ordered  Monitor patient's weight and dietary intake as ordered or per policy  Utilize nutrition screening tool and intervene as necessary   Determine patient's food preferences and provide high-protein, high-caloric foods as appropriate       INTERVENTIONS:  - Monitor oral intake, urinary output, labs, and treatment plans  - Assess nutrition and hydration status and recommend course of action  - Evaluate amount of meals eaten  - Assist patient with eating if necessary   - Allow adequate time for meals  - Recommend/ encourage appropriate diets, oral nutritional supplements, and vitamin/mineral supplements  - Order, calculate, and assess calorie counts as needed  - Recommend, monitor, and adjust tube feedings and TPN/PPN based on assessed needs  - Assess need for intravenous fluids  - Provide specific nutrition/hydration education as appropriate  - Include patient/family/caregiver in decisions related to nutrition  Outcome: Progressing

## 2020-01-14 ENCOUNTER — DOCUMENTATION (OUTPATIENT)
Dept: PHYSICAL THERAPY | Facility: HOSPITAL | Age: 60
End: 2020-01-14

## 2020-01-14 VITALS
BODY MASS INDEX: 29.14 KG/M2 | SYSTOLIC BLOOD PRESSURE: 113 MMHG | HEART RATE: 94 BPM | WEIGHT: 164.46 LBS | TEMPERATURE: 97.2 F | DIASTOLIC BLOOD PRESSURE: 65 MMHG | HEIGHT: 63 IN | OXYGEN SATURATION: 96 % | RESPIRATION RATE: 18 BRPM

## 2020-01-14 RX ADMIN — Medication 1 TABLET: at 08:15

## 2020-01-14 RX ADMIN — LEVOTHYROXINE SODIUM 25 MCG: 25 TABLET ORAL at 05:50

## 2020-01-14 RX ADMIN — ACETAMINOPHEN 975 MG: 325 TABLET ORAL at 13:16

## 2020-01-14 RX ADMIN — PANTOPRAZOLE SODIUM 40 MG: 20 TABLET, DELAYED RELEASE ORAL at 05:50

## 2020-01-14 RX ADMIN — ACETAMINOPHEN 975 MG: 325 TABLET ORAL at 05:50

## 2020-01-14 RX ADMIN — CALCIUM 1 TABLET: 500 TABLET ORAL at 08:15

## 2020-01-14 RX ADMIN — FOLIC ACID 1 MG: 1 TABLET ORAL at 08:15

## 2020-01-14 RX ADMIN — OXYCODONE HYDROCHLORIDE 5 MG: 5 SOLUTION ORAL at 10:27

## 2020-01-14 RX ADMIN — PAROXETINE 10 MG: 20 TABLET, FILM COATED ORAL at 08:15

## 2020-01-14 RX ADMIN — METHOCARBAMOL 750 MG: 750 TABLET, FILM COATED ORAL at 11:33

## 2020-01-14 RX ADMIN — GABAPENTIN 100 MG: 100 CAPSULE ORAL at 08:14

## 2020-01-14 RX ADMIN — METHOCARBAMOL 750 MG: 750 TABLET, FILM COATED ORAL at 05:50

## 2020-01-14 RX ADMIN — ENOXAPARIN SODIUM 40 MG: 40 INJECTION SUBCUTANEOUS at 12:40

## 2020-01-14 RX ADMIN — MELATONIN 2000 UNITS: at 08:15

## 2020-01-14 RX ADMIN — LIDOCAINE 2 PATCH: 50 PATCH CUTANEOUS at 08:14

## 2020-01-14 NOTE — PROGRESS NOTES
Patient Active Problem List   Diagnosis    Lumbar radiculopathy    DDD (degenerative disc disease), lumbar    Spondylolisthesis at L4-L5 level    Localized osteoporosis with current pathological fracture with routine healing    Spinal stenosis of lumbar region with neurogenic claudication    Lumbar spondylosis    T12 compression fracture (HCC)    Synovial cyst of lumbar facet joint    Anxiety    Bulimia nervosa in remission    Constipation    Acquired hypothyroidism    Other fatigue    Serotonin syndrome    Schizoaffective disorder, depressive type ([address] )    ABIMAEL (generalized anxiety disorder)    Dermal hypersensitivity reaction    Elevated BP without diagnosis of hypertension    Essential hypertension    Right hip pain    Chronic bilateral low back pain without sciatica    Iron deficiency anemia secondary to inadequate dietary iron intake    NMS (neuroleptic malignant syndrome)     Date: 1/14/2020  Time: 11:37    Assist x1 for ambulation in the halls using RW  Educated/encouraged pt to ambulate with assistance 3-4 x's/day  Chair alarm on  Pt callbell, phone/tray within reach      Timothy Foot BS, Restorative Technician, [employer]

## 2020-01-14 NOTE — DISCHARGE SUMMARY
Discharge- Snehal Muse 1960, 61 y o  female MRN: [id number]  Unit/Bed#: -01 Encounter: [id number]  Primary Care Provider: Demetris Trejo MD   Date and time admitted to hospital: 12/27/2019 10:59 AM    * Closed right hip fracture (HCC)resolved as of 1/13/2020  Assessment & Plan  · CT femur R w/o contrast (12/31/19): Obliquely oriented almost nondisplaced proximal femoral shaft fracture around the site of the femoral stem without significant displacement of the hardware  Moderate amount of soft tissue calcification in the vicinity of the injury  Diminished density of the vastus intermedius muscle probably representing some muscular edema in the deep compartment  No dislocation at the hip  · S/p OR with orthopedics on 1/2/20  · Pain is well controlled  · Continue Gabapentin 100 mg TID    NMS (neuroleptic malignant syndrome)  Assessment & Plan  · Was transferred to ICU for suspected NMS and treated for this, this occurred post-operatively with rigidity, tremors, hyperthermia, tachycardia, delirium   · Geodon has been discontinued indefinitely  DO NOT RESTART AT DISCHARGE    · Continue Seroquel at 100 mg HS  · Completed Valium taper  · Paxil restarted by Psychiatry    Iron deficiency anemia secondary to inadequate dietary iron intake  Assessment & Plan  · Continue iron supplementation  · Hgb has been stable     ABIMAEL (generalized anxiety disorder)  Assessment & Plan  · Continue psychiatric medications as above    Schizoaffective disorder, depressive type ([facility] [address] )  Assessment & Plan  · Home regimen consists of 400 mg Seroquel at bedtime, Geodon 80 mg BID and Ativan p r n  · Patient was transferred to ICU for suspected NMS  This has been treated  · Geodon has been discontinued indefinitely  · Continue Paxil and Seroquel at current dose  · SNF is requesting a competency evaluation  Evaluation completed and patient is competent      Acquired hypothyroidism  Assessment & Plan  · Continue daily levothyroxine      Discharging Physician / Practitioner: Leandrew Schilder, PA-C  PCP: Belia Magana MD  Admission Date:   Admission Orders (From admission, onward)     Ordered        12/29/19 2030  Inpatient Admission  Once         12/27/19 2144  Place in Observation  Once                   Discharge Date: 01/14/20    Disposition:      Short Term Rehab or SNF at [place] [address] (see below)    For Discharges to   [address] SNF:   · [facility] TREATMENT    Reason for Admission: Right hip pain    Discharge Diagnoses:     Please see assessment and plan section above for further details regarding discharge diagnoses  Resolved Problems  Date Reviewed: 1/13/2020          Resolved    Chronic hyponatremia 1/10/2020     Resolved by  Prince Krystyna MD    Tachycardia 1/8/2020     Resolved by  Prince Krystyna MD    Non-traumatic rhabdomyolysis 1/13/2020     Resolved by  Leandrew Schilder, PA-C    * (Principal) Closed right hip fracture ([address] ) 1/13/2020     Resolved by  Leandrew Schilder, PA-C    SOB (shortness of breath) 1/8/2020     Resolved by  Prince Krystyna MD    Lactic acid increased 1/9/2020     Resolved by  Prince Krystyna MD    Drug-induced fever 1/9/2020     Resolved by  Prince Krystyna MD    Encephalopathy acute 1/9/2020     Resolved by  Prince Krystyna MD    Leukocytosis 1/9/2020     Resolved by  Prince Krystyna MD    D-dimer, elevated 1/13/2020     Resolved by  Leandrew Schilder, PA-C    Hypokalemia 1/9/2020     Resolved by  Prince Krystyna MD    Urinary retention 1/11/2020     Resolved by  Prince Krystyna MD        Consultations During Hospital Stay:  · Orthopedic surgery  · Critical Care  · Neurology  · Medical Toxicology  · Psychiatry  · Urology  · Neuropsychology    Procedures Performed:   · CT right femur (12/31/2019):  Obliquely oriented almost nondisplaced proximal femoral shaft fracture around the site of the femoral stem without significant displacement of the hardware    Moderate amount of soft tissue calcification in the vicinity of the injury  Diminished density of the vastus intermedius [place] muscle probably representing some muscular edema in the deep compartment  No dislocation of hip  · Chest x-ray (01/02/2020): No evidence of acute pulmonary disease  · Revision of total hip arthroplasty with repair of periprosthetic fracture (01/02/2020)  · CT head (01/03/2020):  No acute intracranial abnormality  · Echocardiogram (01/03/2020):  EF 70% with changes consistent with left ventricular concentric remodeling  No significant valvular heart disease  · Routine EEG (01/03/2020):  Evidence of moderate to severe diffuse cerebral dysfunction  No evidence of epileptogenic activity  Medication Adjustments and Discharge Medications:  · Summary of Medication Adjustments made as a result of this hospitalization:  · Discontinued Geodon INDEFINITELY  · Medication Dosing Tapers - Please refer to Discharge Medication List for details on any medication dosing tapers (if applicable to patient)  · Medications being temporarily held (include recommended restart time): None  · Discharge Medication List: See after visit summary for reconciled discharge medications  Wound Care Recommendations:  When applicable, please see wound care section of After Visit Summary  Diet Recommendations at Discharge:  Diet -        Diet Orders   (From admission, onward)             Start     Ordered    01/03/20 1609  Diet Regular; Regular House  Diet effective midnight     Question Answer Comment   Diet Type Regular    Regular Regular House    RD to adjust diet per protocol? No        01/03/20 1608              Instructions for any Catheters / Lines Present at Discharge (including removal date, if applicable): n/a    Significant Findings / Test Results:   · See above    Incidental Findings:   · None     Test Results Pending at Discharge (will require follow up):    · None     Outpatient Tests Requested:  · None    Complications:  Neuroleptic malignant syndrome    Hospital Course:     Klaus Ndiaye is a 61 y o  female patient who originally presented to the hospital on 12/27/2019 due to progressively worsening right hip pain  Of note is that the patient was originally hospitalized in early December for elective right hip arthroplasty for advanced osteoarthritis  She was discharged to rehab but signed herself out AMA after she was unsatisfied with her care and the facilities  She went back home and sustained a mechanical fall  She was seen as an out-patient in the orthopedics office and found to have a new periprosthetic femur fracture  Originally, no surgical intervention was planned; however, her pain intensified and she presented to the hospital   Orthopedics then completed revision of the arthroplasty and repair of periprosthetic fracture on 01/02/2020  While in the PACU, she developed hypotension, tachycardia, diaphoresis, shortness of breath followed by muscle stiffness, tremors and hyperthermia  Patient was transferred to ICU and treated for neuroleptic malignant syndrome with IV Valium, Dantrolene and bromocriptine  She clinically improved and was moved out of the unit  She required rehab and after being deemed competent was transferred to [facility] T[facility]MENT  Condition at Discharge: stable     Discharge Day Visit / Exam:   Subjective: On the day of discharge, the patient is doing well overall  Her pain is controlled  She is excited about moving on to rehab  Vitals: Blood Pressure: 121/71 (01/13/20 1608)  Pulse: 105 (01/13/20 1608)  Temperature: 98 °F (36 7 °C) (01/13/20 0739)  Temp Source: Oral (01/10/20 2235)  Respirations: 16 (01/13/20 1608)  Height: 5' 3" (160 cm) (01/05/20 1437)  Weight - Scale: 74 6 kg (164 lb 7 4 oz) (01/13/20 0533)  SpO2: 98 % (01/13/20 1608)  Exam:   Physical Exam   HENT:   Head: Normocephalic and atraumatic     Mouth/Throat: Oropharynx is clear and moist and mucous membranes are normal    Eyes: No scleral icterus  Cardiovascular: Normal rate and regular rhythm  No murmur heard  Pulmonary/Chest: Breath sounds normal  She has no wheezes  She has no rales  She exhibits no tenderness  Abdominal: Soft  Bowel sounds are normal  She exhibits no distension  There is no tenderness  Musculoskeletal: Normal range of motion  She exhibits no edema  Skin: Skin is warm and dry  No rash noted  Psychiatric: She has a normal mood and affect  Vitals reviewed  Discussion with Family: None    Goals of Care Discussions:  · Code Status at Discharge: Level 1 - Full Code  · Were there any Goals of Care Discussions during Hospitalization?: Yes  · Results of any General Goals of Care Discussions: Treatment oriented   · POLST Completed: No   · If POLST Completed, Summary of POLST Agreement Provided Here: n/a   · OK to Rehospitalize if Needed? Yes    Discharge instructions/Information to patient and family:   See after visit summary section titled Discharge Instructions for information provided to patient and family  Planned Readmission: No      Discharge Statement:  I spent 45 minutes discharging the patient  This time was spent on the day of discharge  I had direct contact with the patient on the day of discharge  Greater than 50% of the total time was spent examining patient, answering all patient questions, arranging and discussing plan of care with patient as well as directly providing post-discharge instructions  Additional time then spent on discharge activities      ** Please Note: This note has been constructed using a voice recognition system **

## 2020-01-14 NOTE — SOCIAL WORK
YASMIN met with Mark aBrrera at [address] in [facility] office  CM asked Naty Mcginnis if she had a direct contact number for [employer], [facility]  Mark Barrera contacted contacted [employer] at [facility]  Mila reported that she would fax determination letter to [facility] liaison at [phone]  Mila reported she would fax  Mila reported that letter was valid until the admission date to the facility

## 2020-01-14 NOTE — SOCIAL WORK
CM called , Neema Amaro to verify letter of determination  CM awaiting a contact back to verify  CM received a phone call from Rigo Manjarrez [phone] in regards to letter of determination  She reported that pt was still eligible for nursing facility placement  CM asked for her approval to state her name and date; [facility]  Nancy Olivo approved  CM met with pt to request signature [facility]  CM received a phone call from Donte Pavon with [facility]  Kelsie Roldan approved the nursing facility eligible and she reported CM can use her name to identify on the [address]  CM spoke to [facility] supervisor, Terry Pantoja reported that the [facility] had given CM incorrect information  CM needs to contact the phone # on the letter of determination [phone] to report the situation  CM called # provided and left VM  CM attempted [phone], CM was routed to use [phone]  CM contacted [facility] liaison Lety to report current status of determination  CM attached new [facility] to [facility] and sent to [address] [place] [address] and information was reviewed  Pt was approved for admission today  CM set up prem Olvera for 5:30PM to [address] [place] [address], facility aware, nurse aware

## 2020-01-14 NOTE — ASSESSMENT & PLAN NOTE
· CT femur R w/o contrast (12/31/19): Obliquely oriented almost nondisplaced proximal femoral shaft fracture around the site of the femoral stem without significant displacement of the hardware  Moderate amount of soft tissue calcification in the vicinity of the injury  Diminished density of the vastus intermedius muscle probably representing some muscular edema in the deep compartment  No dislocation at the hip    · S/p OR with orthopedics on 1/2/20  · Pain is well controlled  · Continue Gabapentin 100 mg TID

## 2020-01-15 ENCOUNTER — TELEPHONE (OUTPATIENT)
Dept: OBGYN CLINIC | Facility: HOSPITAL | Age: 60
End: 2020-01-15

## 2020-01-15 ENCOUNTER — TELEPHONE (OUTPATIENT)
Dept: INTERNAL MEDICINE CLINIC | Facility: CLINIC | Age: 60
End: 2020-01-15

## 2020-01-15 ENCOUNTER — TRANSITIONAL CARE MANAGEMENT (OUTPATIENT)
Dept: INTERNAL MEDICINE CLINIC | Facility: CLINIC | Age: 60
End: 2020-01-15

## 2020-01-15 ENCOUNTER — PATIENT OUTREACH (OUTPATIENT)
Dept: INTERNAL MEDICINE CLINIC | Facility: CLINIC | Age: 60
End: 2020-01-15

## 2020-01-15 PROCEDURE — TCMXX

## 2020-01-15 NOTE — TELEPHONE ENCOUNTER
Patient is asking to speak with you directly    She is upset that her medications were changed while she was recently in the hospital     Please advise

## 2020-01-15 NOTE — TELEPHONE ENCOUNTER
I called patient, she is currently in a skilled nursing facility  She is asking about her psychiatric meds and wants me just them    I encouraged her to get in touch with Psychiatry for any adjustment of her psychiatry meds, can follow-up with her when she is out of the skilled nursing facility

## 2020-01-15 NOTE — PROGRESS NOTES
Chart review reveals that patient was discharged to [facility] on 1/14/2020  Will follow patient for discharge plan and LEO

## 2020-01-15 NOTE — TELEPHONE ENCOUNTER
[facility] [place] [facility] [place]  Patient reports she is having yellow seeping  from the incision  Patient reports that she had been set latex pillow while here in the hospital and she has a latex allergy and from that encounter she has had rashes all over her buttocks  Patient reports that she has been having itching since being at the facility  Patient reports that a doctor at the facility evaluated her this morning and requested she contacted the ortho office for an appointment  Surgeon made aware an appointment scheduled for tomorrow 1/16 at 2:00 p m   Both patient and facility notified

## 2020-01-16 ENCOUNTER — OFFICE VISIT (OUTPATIENT)
Dept: OBGYN CLINIC | Facility: HOSPITAL | Age: 60
End: 2020-01-16

## 2020-01-16 VITALS
HEART RATE: 92 BPM | WEIGHT: 153 LBS | SYSTOLIC BLOOD PRESSURE: 124 MMHG | DIASTOLIC BLOOD PRESSURE: 80 MMHG | BODY MASS INDEX: 27.1 KG/M2

## 2020-01-16 DIAGNOSIS — Z47.1 AFTERCARE FOLLOWING RIGHT HIP JOINT REPLACEMENT SURGERY: Primary | ICD-10-CM

## 2020-01-16 DIAGNOSIS — Z96.641 AFTERCARE FOLLOWING RIGHT HIP JOINT REPLACEMENT SURGERY: Primary | ICD-10-CM

## 2020-01-16 PROCEDURE — 99024 POSTOP FOLLOW-UP VISIT: CPT | Performed by: ORTHOPAEDIC SURGERY

## 2020-01-19 DIAGNOSIS — F41.9 ANXIETY: ICD-10-CM

## 2020-01-20 DIAGNOSIS — M54.16 LUMBAR RADICULOPATHY: Primary | ICD-10-CM

## 2020-01-20 DIAGNOSIS — F41.1 GAD (GENERALIZED ANXIETY DISORDER): Chronic | ICD-10-CM

## 2020-01-20 RX ORDER — QUETIAPINE FUMARATE 100 MG/1
100 TABLET, FILM COATED ORAL
Qty: 60 TABLET | Refills: 0 | Status: SHIPPED | OUTPATIENT
Start: 2020-01-20 | End: 2020-01-27 | Stop reason: CLARIF

## 2020-01-20 RX ORDER — LORAZEPAM 2 MG/1
TABLET ORAL
Qty: 90 TABLET | Refills: 1 | Status: SHIPPED | OUTPATIENT
Start: 2020-01-20 | End: 2020-04-16 | Stop reason: SDUPTHER

## 2020-01-23 ENCOUNTER — HOSPITAL ENCOUNTER (OUTPATIENT)
Dept: RADIOLOGY | Facility: HOSPITAL | Age: 60
Discharge: HOME | End: 2020-01-23
Attending: ORTHOPAEDIC SURGERY
Payer: COMMERCIAL

## 2020-01-23 ENCOUNTER — OFFICE VISIT (OUTPATIENT)
Dept: OBGYN CLINIC | Facility: HOSPITAL | Age: 60
End: 2020-01-23

## 2020-01-23 VITALS
DIASTOLIC BLOOD PRESSURE: 73 MMHG | WEIGHT: 153 LBS | SYSTOLIC BLOOD PRESSURE: 112 MMHG | BODY MASS INDEX: 27.11 KG/M2 | HEIGHT: 63 IN | HEART RATE: 86 BPM

## 2020-01-23 DIAGNOSIS — Z96.641 STATUS POST RIGHT HIP REPLACEMENT: ICD-10-CM

## 2020-01-23 DIAGNOSIS — Z96.641 STATUS POST RIGHT HIP REPLACEMENT: Primary | ICD-10-CM

## 2020-01-23 PROCEDURE — 73502 X-RAY EXAM HIP UNI 2-3 VIEWS: CPT

## 2020-01-23 PROCEDURE — 99024 POSTOP FOLLOW-UP VISIT: CPT | Performed by: ORTHOPAEDIC SURGERY

## 2020-01-26 NOTE — PROGRESS NOTES
61 y o  female presenting to the office for 3 week follow up status post right revision posterior total hip arthroplasty (dos: 1/2/2020) for a right hip periprosthetic fracture  Patient states that her pain is minimal  She is taking medications for pain  Patient has functional limitations from the hip, which are substantially improved at this time  Patient continues to have numbness surrounding the surgical site  Patient denies any other radicular symptoms  Patient denies symptoms of an infection  Patient denies any instability  Patient denies any other acute or associated complaints  ROS  Review of Systems - otherwise negative    Past Medical History:   Diagnosis Date    Anxiety     Back pain at L4-L5 level     Schreiber esophagus     Bulimia     Disease of thyroid gland     hypothyroid    GERD (gastroesophageal reflux disease)     Hyperlipidemia     Hypothyroidism     Leukopenia     NMS (neuroleptic malignant syndrome) 01/03/2020    Rheumatoid arthritis involving right hip (HCC)     Sciatica     Seizure ([address] )     due to medication mix up 8/2018 only one historically    Synovial cyst of lumbar spine     Vertigo     Weight gain      Past Surgical History:   Procedure Laterality Date    BONE MARROW BIOPSY      BREAST SURGERY      enlargement     COLONOSCOPY      HIP ARTHROPLASTY Right 1/2/2020    Procedure: REVISION TOTAL HIP ARTHROPLASTY WITH REPAIR OF PARIPROSTHETIC FRACTURE - POSTERIOR APPROACH;  Surgeon: Yari Malhotra MD;  [facility];  Service: Orthopedics    IN TOTAL HIP ARTHROPLASTY Right 12/11/2019    Procedure: ARTHROPLASTY HIP TOTAL ANTERIOR;  Surgeon: Yari Malhotra MD;  [facility];  Service: Orthopedics    RHINOPLASTY       Results Reviewed     None          Physical Exam  Physical Exam   Constitutional: She is oriented to person, place, and time  She appears well-developed and well-nourished  HENT:   Head: Normocephalic and atraumatic     Eyes: Pupils are equal, round, and reactive to light  EOM are normal    Neck: Neck supple  No tracheal deviation present  Cardiovascular: Normal rate and regular rhythm  Pulmonary/Chest: Effort normal and breath sounds normal    Abdominal: There is no guarding  Neurological: She is alert and oriented to person, place, and time  Skin: Skin is warm and dry  Rash noted  Psychiatric: She has a normal mood and affect  Her behavior is normal      Right Hip Exam     Tenderness   The patient is experiencing no tenderness  Other   Erythema: absent  Sensation: normal    Comments:  Patient has well-healing posterior incision without wound dehiscence  She has some mild staple irritation without drainage  Her anterior incision has completely healed and does not have any erythema surrounding at this time  Patient has excoriations on her buttock area, which she states is secondary to latex underwear  Imaging  I personally reviewed these images  :  Orthopedic hardware in good position with stable appearance of joint prosthesis    Assessment/Plan  61 y o  female  3 week follow up status post right revision posterior total hip arthroplasty   · Continue with activities as tolerated  · Continue with physical therapy activities  · Patient is going to be discharged from the rehab facility on Saturday and will start home health therapy shortly afterwards  · Due to excoriated skin near patient incision secondary to itching from allergic reaction, will give prophylactic Keflex dosing for 5 days    · Complete DVT ppx (4 weeks post-op)  · Hold off on dental appointments for 6 weeks post-op  · Take antibiotics prior to any dental appointments  · This is a life-long requirement  · Follow up in 3 weeks for evaluation with x-ray

## 2020-01-27 ENCOUNTER — PATIENT OUTREACH (OUTPATIENT)
Dept: INTERNAL MEDICINE CLINIC | Facility: CLINIC | Age: 60
End: 2020-01-27

## 2020-01-27 ENCOUNTER — HOSPITAL ENCOUNTER (OUTPATIENT)
Facility: HOSPITAL | Age: 60
Setting detail: OBSERVATION
Discharge: HOME HEALTH | End: 2020-01-27
Attending: EMERGENCY MEDICINE | Admitting: INTERNAL MEDICINE
Payer: COMMERCIAL

## 2020-01-27 ENCOUNTER — APPOINTMENT (EMERGENCY)
Dept: RADIOLOGY | Facility: HOSPITAL | Age: 60
End: 2020-01-27
Payer: COMMERCIAL

## 2020-01-27 VITALS
OXYGEN SATURATION: 97 % | SYSTOLIC BLOOD PRESSURE: 118 MMHG | HEART RATE: 86 BPM | TEMPERATURE: 97.5 F | BODY MASS INDEX: 27.11 KG/M2 | RESPIRATION RATE: 16 BRPM | HEIGHT: 63 IN | DIASTOLIC BLOOD PRESSURE: 61 MMHG | WEIGHT: 153 LBS

## 2020-01-27 DIAGNOSIS — S73.004A DISLOCATION OF RIGHT HIP (HCC): ICD-10-CM
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
## 2020-01-27 DIAGNOSIS — R26.2 AMBULATORY DYSFUNCTION: Primary | ICD-10-CM

## 2020-01-27 PROBLEM — E87.1 HYPONATREMIA: Status: ACTIVE | Noted: 2020-01-27

## 2020-01-27 PROBLEM — Z59.9: Status: ACTIVE | Noted: 2020-01-27

## 2020-01-27 PROBLEM — K21.9 GERD (GASTROESOPHAGEAL REFLUX DISEASE): Status: ACTIVE | Noted: 2020-01-27
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
## 2020-01-27 LAB
ANION GAP SERPL CALCULATED.3IONS-SCNC: 4 MMOL/L (ref 4–13)
ANION GAP SERPL CALCULATED.3IONS-SCNC: 6 MMOL/L (ref 4–13)
BASOPHILS # BLD AUTO: 0.01 THOUSANDS/ΜL (ref 0–0.1)
BASOPHILS NFR BLD AUTO: 0 % (ref 0–1)
BUN SERPL-MCNC: 3 MG/DL (ref 5–25)
BUN SERPL-MCNC: 6 MG/DL (ref 5–25)
CALCIUM SERPL-MCNC: 8.9 MG/DL (ref 8.3–10.1)
CALCIUM SERPL-MCNC: 9.1 MG/DL (ref 8.3–10.1)
CHLORIDE SERPL-SCNC: 103 MMOL/L (ref 100–108)
CHLORIDE SERPL-SCNC: 95 MMOL/L (ref 100–108)
CO2 SERPL-SCNC: 29 MMOL/L (ref 21–32)
CO2 SERPL-SCNC: 29 MMOL/L (ref 21–32)
CREAT SERPL-MCNC: 0.46 MG/DL (ref 0.6–1.3)
CREAT SERPL-MCNC: 0.47 MG/DL (ref 0.6–1.3)
CRP SERPL QL: 10 MG/L
EOSINOPHIL # BLD AUTO: 0.04 THOUSAND/ΜL (ref 0–0.61)
EOSINOPHIL NFR BLD AUTO: 1 % (ref 0–6)
ERYTHROCYTE [DISTWIDTH] IN BLOOD BY AUTOMATED COUNT: 17.5 % (ref 11.6–15.1)
ERYTHROCYTE [SEDIMENTATION RATE] IN BLOOD: 51 MM/HOUR (ref 0–20)
GFR SERPL CREATININE-BSD FRML MDRD: 108 ML/MIN/1.73SQ M
GFR SERPL CREATININE-BSD FRML MDRD: 109 ML/MIN/1.73SQ M
GLUCOSE P FAST SERPL-MCNC: 89 MG/DL (ref 65–99)
GLUCOSE SERPL-MCNC: 113 MG/DL (ref 65–140)
GLUCOSE SERPL-MCNC: 89 MG/DL (ref 65–140)
HCT VFR BLD AUTO: 31.6 % (ref 34.8–46.1)
HGB BLD-MCNC: 10.4 G/DL (ref 11.5–15.4)
IMM GRANULOCYTES # BLD AUTO: 0.03 THOUSAND/UL (ref 0–0.2)
IMM GRANULOCYTES NFR BLD AUTO: 1 % (ref 0–2)
LYMPHOCYTES # BLD AUTO: 0.45 THOUSANDS/ΜL (ref 0.6–4.47)
LYMPHOCYTES NFR BLD AUTO: 8 % (ref 14–44)
MCH RBC QN AUTO: 30.5 PG (ref 26.8–34.3)
MCHC RBC AUTO-ENTMCNC: 32.9 G/DL (ref 31.4–37.4)
MCV RBC AUTO: 93 FL (ref 82–98)
MONOCYTES # BLD AUTO: 0.4 THOUSAND/ΜL (ref 0.17–1.22)
MONOCYTES NFR BLD AUTO: 7 % (ref 4–12)
NEUTROPHILS # BLD AUTO: 4.54 THOUSANDS/ΜL (ref 1.85–7.62)
NEUTS SEG NFR BLD AUTO: 83 % (ref 43–75)
NRBC BLD AUTO-RTO: 1 /100 WBCS
PLATELET # BLD AUTO: 258 THOUSANDS/UL (ref 149–390)
PLATELET # BLD AUTO: 267 THOUSANDS/UL (ref 149–390)
PMV BLD AUTO: 8.5 FL (ref 8.9–12.7)
PMV BLD AUTO: 8.6 FL (ref 8.9–12.7)
POTASSIUM SERPL-SCNC: 3.7 MMOL/L (ref 3.5–5.3)
POTASSIUM SERPL-SCNC: 4 MMOL/L (ref 3.5–5.3)
RBC # BLD AUTO: 3.41 MILLION/UL (ref 3.81–5.12)
SODIUM SERPL-SCNC: 130 MMOL/L (ref 136–145)
SODIUM SERPL-SCNC: 136 MMOL/L (ref 136–145)
WBC # BLD AUTO: 5.47 THOUSAND/UL (ref 4.31–10.16)

## 2020-01-27 PROCEDURE — 86140 C-REACTIVE PROTEIN: CPT | Performed by: ORTHOPAEDIC SURGERY

## 2020-01-27 PROCEDURE — 85049 AUTOMATED PLATELET COUNT: CPT | Performed by: INTERNAL MEDICINE

## 2020-01-27 PROCEDURE — 96374 THER/PROPH/DIAG INJ IV PUSH: CPT

## 2020-01-27 PROCEDURE — 99217 PR OBSERVATION CARE DISCHARGE MANAGEMENT: CPT | Performed by: NURSE PRACTITIONER

## 2020-01-27 PROCEDURE — 85652 RBC SED RATE AUTOMATED: CPT | Performed by: ORTHOPAEDIC SURGERY

## 2020-01-27 PROCEDURE — 85025 COMPLETE CBC W/AUTO DIFF WBC: CPT | Performed by: EMERGENCY MEDICINE

## 2020-01-27 PROCEDURE — 97167 OT EVAL HIGH COMPLEX 60 MIN: CPT

## 2020-01-27 PROCEDURE — 36415 COLL VENOUS BLD VENIPUNCTURE: CPT | Performed by: EMERGENCY MEDICINE

## 2020-01-27 PROCEDURE — 99285 EMERGENCY DEPT VISIT HI MDM: CPT | Performed by: EMERGENCY MEDICINE

## 2020-01-27 PROCEDURE — 97163 PT EVAL HIGH COMPLEX 45 MIN: CPT

## 2020-01-27 PROCEDURE — 96361 HYDRATE IV INFUSION ADD-ON: CPT

## 2020-01-27 PROCEDURE — 80048 BASIC METABOLIC PNL TOTAL CA: CPT | Performed by: EMERGENCY MEDICINE

## 2020-01-27 PROCEDURE — 99285 EMERGENCY DEPT VISIT HI MDM: CPT

## 2020-01-27 PROCEDURE — 99024 POSTOP FOLLOW-UP VISIT: CPT | Performed by: ORTHOPAEDIC SURGERY

## 2020-01-27 PROCEDURE — 73502 X-RAY EXAM HIP UNI 2-3 VIEWS: CPT

## 2020-01-27 PROCEDURE — 73501 X-RAY EXAM HIP UNI 1 VIEW: CPT

## 2020-01-27 PROCEDURE — 80048 BASIC METABOLIC PNL TOTAL CA: CPT | Performed by: NURSE PRACTITIONER

## 2020-01-27 PROCEDURE — NC001 PR NO CHARGE: Performed by: ORTHOPAEDIC SURGERY

## 2020-01-27 PROCEDURE — 99220 PR INITIAL OBSERVATION CARE/DAY 70 MINUTES: CPT | Performed by: INTERNAL MEDICINE

## 2020-01-27 RX ORDER — FOLIC ACID 1 MG/1
1 TABLET ORAL DAILY
Status: DISCONTINUED | OUTPATIENT
Start: 2020-01-27 | End: 2020-01-27 | Stop reason: HOSPADM

## 2020-01-27 RX ORDER — PROPOFOL 10 MG/ML
1 INJECTION, EMULSION INTRAVENOUS ONCE
Status: COMPLETED | OUTPATIENT
Start: 2020-01-27 | End: 2020-01-27

## 2020-01-27 RX ORDER — ACETAMINOPHEN 325 MG/1
650 TABLET ORAL EVERY 6 HOURS PRN
Status: DISCONTINUED | OUTPATIENT
Start: 2020-01-27 | End: 2020-01-27 | Stop reason: HOSPADM

## 2020-01-27 RX ORDER — QUETIAPINE FUMARATE 400 MG/1
400 TABLET, FILM COATED ORAL
COMMUNITY
End: 2020-03-17 | Stop reason: SDUPTHER

## 2020-01-27 RX ORDER — LEVOTHYROXINE SODIUM 0.03 MG/1
25 TABLET ORAL
Status: DISCONTINUED | OUTPATIENT
Start: 2020-01-27 | End: 2020-01-27 | Stop reason: HOSPADM

## 2020-01-27 RX ORDER — METHOCARBAMOL 750 MG/1
750 TABLET, FILM COATED ORAL EVERY 6 HOURS SCHEDULED
Status: DISCONTINUED | OUTPATIENT
Start: 2020-01-27 | End: 2020-01-27 | Stop reason: HOSPADM

## 2020-01-27 RX ORDER — GABAPENTIN 100 MG/1
100 CAPSULE ORAL EVERY 8 HOURS SCHEDULED
Status: DISCONTINUED | OUTPATIENT
Start: 2020-01-27 | End: 2020-01-27 | Stop reason: HOSPADM

## 2020-01-27 RX ORDER — PROPOFOL 10 MG/ML
1 INJECTION, EMULSION INTRAVENOUS ONCE
Status: DISCONTINUED | OUTPATIENT
Start: 2020-01-27 | End: 2020-01-27 | Stop reason: HOSPADM

## 2020-01-27 RX ORDER — ASCORBIC ACID 500 MG
500 TABLET ORAL 2 TIMES DAILY
Status: DISCONTINUED | OUTPATIENT
Start: 2020-01-27 | End: 2020-01-27 | Stop reason: HOSPADM

## 2020-01-27 RX ORDER — PAROXETINE 10 MG/1
10 TABLET, FILM COATED ORAL DAILY
Status: DISCONTINUED | OUTPATIENT
Start: 2020-01-27 | End: 2020-01-27 | Stop reason: HOSPADM

## 2020-01-27 RX ORDER — OXYCODONE HYDROCHLORIDE 5 MG/1
5 TABLET ORAL EVERY 6 HOURS PRN
Status: DISCONTINUED | OUTPATIENT
Start: 2020-01-27 | End: 2020-01-27 | Stop reason: HOSPADM

## 2020-01-27 RX ORDER — LORAZEPAM 0.5 MG/1
2 TABLET ORAL EVERY 8 HOURS PRN
Status: DISCONTINUED | OUTPATIENT
Start: 2020-01-27 | End: 2020-01-27 | Stop reason: HOSPADM

## 2020-01-27 RX ORDER — FERROUS SULFATE 325(65) MG
325 TABLET ORAL 2 TIMES DAILY WITH MEALS
Status: DISCONTINUED | OUTPATIENT
Start: 2020-01-27 | End: 2020-01-27 | Stop reason: HOSPADM

## 2020-01-27 RX ORDER — ONDANSETRON 2 MG/ML
4 INJECTION INTRAMUSCULAR; INTRAVENOUS EVERY 6 HOURS PRN
Status: DISCONTINUED | OUTPATIENT
Start: 2020-01-27 | End: 2020-01-27 | Stop reason: HOSPADM

## 2020-01-27 RX ORDER — KETOROLAC TROMETHAMINE 30 MG/ML
15 INJECTION, SOLUTION INTRAMUSCULAR; INTRAVENOUS ONCE
Status: COMPLETED | OUTPATIENT
Start: 2020-01-27 | End: 2020-01-27

## 2020-01-27 RX ORDER — PANTOPRAZOLE SODIUM 40 MG/1
40 TABLET, DELAYED RELEASE ORAL
Status: DISCONTINUED | OUTPATIENT
Start: 2020-01-27 | End: 2020-01-27 | Stop reason: HOSPADM

## 2020-01-27 RX ADMIN — FOLIC ACID 1 MG: 1 TABLET ORAL at 08:57

## 2020-01-27 RX ADMIN — KETOROLAC TROMETHAMINE 15 MG: 30 INJECTION, SOLUTION INTRAMUSCULAR at 02:10

## 2020-01-27 RX ADMIN — PANTOPRAZOLE SODIUM 40 MG: 40 TABLET, DELAYED RELEASE ORAL at 08:57

## 2020-01-27 RX ADMIN — LEVOTHYROXINE SODIUM 25 MCG: 25 TABLET ORAL at 08:57

## 2020-01-27 RX ADMIN — SODIUM CHLORIDE 1000 ML: 0.9 INJECTION, SOLUTION INTRAVENOUS at 02:58

## 2020-01-27 RX ADMIN — OXYCODONE HYDROCHLORIDE AND ACETAMINOPHEN 500 MG: 500 TABLET ORAL at 08:57

## 2020-01-27 RX ADMIN — PROPOFOL 69 MG: 10 INJECTION, EMULSION INTRAVENOUS at 04:01

## 2020-01-27 RX ADMIN — PAROXETINE 10 MG: 10 TABLET, FILM COATED ORAL at 08:57

## 2020-01-27 RX ADMIN — Medication 1 TABLET: at 08:57

## 2020-01-27 RX ADMIN — ENOXAPARIN SODIUM 40 MG: 40 INJECTION SUBCUTANEOUS at 08:56

## 2020-01-27 RX ADMIN — FERROUS SULFATE TAB 325 MG (65 MG ELEMENTAL FE) 325 MG: 325 (65 FE) TAB at 08:57

## 2020-01-27 RX ADMIN — GABAPENTIN 100 MG: 100 CAPSULE ORAL at 08:56

## 2020-01-27 RX ADMIN — METHOCARBAMOL TABLETS 750 MG: 750 TABLET, COATED ORAL at 08:57

## 2020-01-27 NOTE — PHYSICAL THERAPY NOTE
Physical Therapy Evaluation    Patient's Name: Sonya Gonzalez    Admitting Diagnosis  Unspecified multiple injuries, initial encounter [T07  XXXA]    Problem List  Patient Active Problem List   Diagnosis    Lumbar radiculopathy    DDD (degenerative disc disease), lumbar    Spondylolisthesis at L4-L5 level    Localized osteoporosis with current pathological fracture with routine healing    Spinal stenosis of lumbar region with neurogenic claudication    Lumbar spondylosis    T12 compression fracture (HCC)    Synovial cyst of lumbar facet joint    Anxiety    Bulimia nervosa in remission    Constipation    Acquired hypothyroidism    Other fatigue    Serotonin syndrome    Schizoaffective disorder, depressive type ([address] )    ABIMAEL (generalized anxiety disorder)    Dermal hypersensitivity reaction    Elevated BP without diagnosis of hypertension    Essential hypertension    Right hip pain    Chronic bilateral low back pain without sciatica    Iron deficiency anemia secondary to inadequate dietary iron intake    NMS (neuroleptic malignant syndrome)    Fall at home    GERD (gastroesophageal reflux disease)    Hyponatremia    Problem related to living arrangement       Past Medical History  Past Medical History:   Diagnosis Date    Anxiety     Back pain at L4-L5 level     Schreiber esophagus     Bulimia     Disease of thyroid gland     hypothyroid    GERD (gastroesophageal reflux disease)     Hyperlipidemia     Hypothyroidism     Leukopenia     NMS (neuroleptic malignant syndrome) 01/03/2020    Rheumatoid arthritis involving right hip (HCC)     Sciatica     Seizure ([address] )     due to medication mix up 8/2018 only one historically    Synovial cyst of lumbar spine     Vertigo     Weight gain        Past Surgical History  Past Surgical History:   Procedure Laterality Date    BONE MARROW BIOPSY      BREAST SURGERY      enlargement     COLONOSCOPY      HIP ARTHROPLASTY Right 1/2/2020 Procedure: REVISION TOTAL HIP ARTHROPLASTY WITH REPAIR OF PARIPROSTHETIC FRACTURE - POSTERIOR APPROACH;  Surgeon: Hemant Martin MD;  [facility];  Service: Orthopedics    UT TOTAL HIP ARTHROPLASTY Right 12/11/2019    Procedure: ARTHROPLASTY HIP TOTAL ANTERIOR;  Surgeon: Hemant Martin MD;  [facility];  Service: Orthopedics    RHINOPLASTY          01/27/20 1016   Note Type   Note type Eval only   Pain Assessment   Pain Assessment No/denies pain   Pain Score No Pain   Home Living   Type of [address] One level;Stairs to enter without rails  ([address])   Bathroom Shower/Tub   (sponge bathes)   Bathroom Equipment Commode; Shower chair   Home Equipment Walker;Quad cane   Prior Function   Level of [place] Needs assistance with IADLs   Lives With Family  (81 yo mother)   [address] [facility]   IADLs Needs assistance   Falls in the last 6 months 1 to 4   Comments Pt reports fall occured sliding out of bed, one other recent fall resulting in leroy-prosthetic fx in Decemeber required hip revision  Per medical record, EMS indicated unsafe living environment, possible hoarding situation  Pt D/C from rehab 2 days ago, using quad cane pt states secondary to no space in home for RW   Restrictions/Precautions   Weight Bearing Precautions Per Order Yes   RLE Weight Bearing Per Order WBAT   Braces or Orthoses   (hip abduction pillow )   Other Precautions THR; Chair Alarm; Bed Alarm;Cognitive; Fall Risk   General   Family/Caregiver Present No   Cognition   Overall Cognitive Status Impaired   Arousal/Participation Alert   Orientation Level Oriented X4   Following Commands Follows multistep commands with increased time or repetition   Comments Pt able to recall 3/3 hip precautions upon PT arrival from initial [facility] in December    RLE Assessment   RLE Assessment X   Strength RLE   R Knee Extension 3/5   R Hip Flexion 3/5   LLE Assessment   LLE Assessment [facility] Coordination   Movements are Fluid and Coordinated 1   Sensation WFL   Light Touch   RLE Light Touch Grossly intact   LLE Light Touch Grossly intact   Bed Mobility   Supine to Sit 3  Moderate assistance   Additional items Assist x 1;LE management; Increased time required   Sit to Supine 3  Moderate assistance   Additional items Assist x 1; Increased time required;LE management   Additional Comments VC's for sequencing of bed mobility to maintain hip precautions    Transfers   Sit to Stand 4  Minimal assistance   Additional items Assist x 1; Impulsive;Verbal cues   Stand to Sit 4  Minimal assistance   Additional items Assist x 1;Verbal cues; Increased time required;Bed elevated   Additional Comments VC's for hand placement, avoid hip flexion upon standing    Ambulation/Elevation   Gait pattern Improper Weight shift;Decreased R stance;Decreased foot clearance; Forward Flexion   Gait Assistance 4  Minimal assist   Additional items Assist x 1;Verbal cues  (VC's for rotational turns to avoid hip IR )   Assistive Device Rolling walker   Distance 80 ft   Stair Management Assistance Not tested   Balance   Static Sitting Fair +   Dynamic Sitting Fair   Static Standing Poor +   Dynamic Standing Poor   Ambulatory Poor   Endurance Deficit   Endurance Deficit Yes   Endurance Deficit Description fatigue    Activity Tolerance   Activity Tolerance Patient limited by fatigue   Medical Staff Made Aware OT, CM   Nurse Made Aware RN cleared pt for OOB activity/PT evaluation   Assessment   Prognosis Good   Problem List Decreased strength;Decreased endurance; Impaired balance;Decreased mobility; Decreased safety awareness;Orthopedic restrictions   Assessment Pt is a pleasant 60 yo female admitted after sustaining a fall out of bed resulting in R hip dislocation s/p reduction in ED by ortho  Pt s/p [facility] in December 8387, complicated by fall earlier this month resulting in periprosthetic fracture    Pt D/C'd from rehab 2 days prior to ED arrival   Pt resides with elderly mother, per medical record, EMS indicated unsafe living environment  Pt reports being unable to use RW in home due to limited space, has been using quad cane for ambulation within the home  Currently, pt requires modA for bed mobility, Sal for transfers and Sal for ambulation 80 ft with RW  Pt unsafe to D/C home given inability to demonstrate hip precautions with mobility, inability to don/doff hip abduction pillow and elderly mother unable to assist, reliance on RW  Recommend continued therapy in IP rehab upon hospital D/C, communicated with CM  Barriers to Discharge Inaccessible home environment;Decreased caregiver support   Barriers to Discharge Comments no clear pathway for RW use, no caregiver support for abd pillow    Goals   Patient Goals "to go home"   STG Expiration Date 02/06/20   Short Term Goal #1 In 10 days pt will be able to: 1  Perform bed mobility independently to improve functional independence  2  Perform functional transfers independently with RW to facilitate safe return to previous living environment  3   Ambulate 150 ft with RW and supervision with stable vitals to improve safety with household distances and reduce fall risk  4  Climb 2 steps with quad cane to simulate entrance to home  5  Improve LE strength grades by 1 to increase independence with transfers and gait  6  Demonstrate functional mobility while maintaining all hip precautions  Plan   Treatment/Interventions Functional transfer training;LE strengthening/ROM; Therapeutic exercise; Endurance training;Bed mobility;Gait training;Spoke to case management;OT   PT Frequency Other (Comment)  (3-5x/week)   Recommendation   Recommendation Post acute IP rehab   Equipment Recommended   (none, pt has all DME )   PT - OK to Discharge Yes  (to IP rehab when medically stable)   Barthel Index   Feeding 10   Bathing 0   Grooming Score 0   Dressing Score 5   Bladder Score 10   Bowels Score 10 Toilet Use Score 5   Transfers (Bed/Chair) Score 10   Mobility (Level Surface) Score 10   Stairs Score 0   Barthel Index Score 60       Hayes Moore, PT, DPT

## 2020-01-27 NOTE — ASSESSMENT & PLAN NOTE
· Sodium 130 on admission  Appears dry on exam, was given 1 L intravenous fluid bolus  · Will repeat BMP now   Further plan based on updated result

## 2020-01-27 NOTE — SOCIAL WORK
TC received from [facility] with [address] made report due to home conditions reported by EMS

## 2020-01-27 NOTE — ED PROVIDER NOTES
History  Chief Complaint   Patient presents with   Edward Spinner Fall     pt had right hip replacement in December, re fractured right hip in January, staples removed last Thursday, home from rehab 1 day ago, pt going to sit on her bed and when backward pt missed the bed and fell onto her buttocks, per EMS "pt is not safe to go home" hoarding situation in home and pt is unable to get a walker through hallways, EMS made a pathway and mother moved items back     Patient presents after having a fall at home  Past medical history significant for anxiety, hyperlipidemia, hypertension, hypothyroidism  In December patient had surgery on her right hip, and a revision earlier this month  She was then in rehab and was discharged home on Saturday  Today she was at home was attempting to sit back on her bed when she fell again stent in slid to the ground and had the acute onset of pain in her right hip  At that time she was unable to get up and ambulate on her own and had called EMS to bring her into the hospital   Currently she complains of pain in her right hip and right gluteal region with occasional radiation to her right knee  No relieving or exacerbating factors  She denies any fever/chills, nausea/vomiting, cough congestion, chest pain, abdominal pain, change in bowel or bladder habits  Of note when EMS arrived they stated that they felt that her house that she was staying at was in disrepair and was a [facility]  Patient lives with her 80-year-old mother  Prior to Admission Medications   Prescriptions Last Dose Informant Patient Reported? Taking?    LORazepam (ATIVAN) 2 mg tablet   No Yes   Sig: TAKE 1 TABLET (2 MG TOTAL) BY MOUTH EVERY 8 (EIGHT) HOURS AS NEEDED FOR ANXIETY   Multiple Vitamin (MULTIVITAMIN) capsule  Self No Yes   Sig: Take 1 capsule by mouth daily   PARoxetine (PAXIL) 10 mg tablet   No Yes   Sig: Take 1 tablet (10 mg total) by mouth daily   QUEtiapine (SEROquel) 100 mg tablet No Yes   Sig: Take 1 tablet (100 mg total) by mouth daily at bedtime   QUEtiapine (SEROquel) 100 mg tablet   No Yes   Sig: Take 1 tablet (100 mg total) by mouth daily at bedtime Per psychology recommendations: increase by 50mg PO daily until reaches 400mg PO HS   acetaminophen (TYLENOL) 325 mg tablet  Self No Yes   Sig: Take 2 tablets (650 mg total) by mouth every 6 (six) hours as needed for mild pain or fever   ascorbic acid (VITAMIN C) 500 mg tablet  Self No Yes   Sig: Take 1 tablet (500 mg total) by mouth 2 (two) times a day   enoxaparin (LOVENOX) 40 mg/0 4 mL   No Yes   Sig: Inject 0 4 mL (40 mg total) under the skin every 24 hours for 28 days   ferrous sulfate 324 (65 Fe) mg  Self No Yes   Sig: Take 1 tablet (324 mg total) by mouth 2 (two) times a day before meals   folic acid (FOLVITE) 1 mg tablet  Self No Yes   Sig: Take 1 tablet (1 mg total) by mouth daily   gabapentin (NEURONTIN) 100 mg capsule  Self No Yes   Sig: Take 1 capsule (100 mg total) by mouth every 8 (eight) hours   levothyroxine 25 mcg tablet  Self Yes Yes   Sig: Take 25 mcg by mouth daily     methocarbamol (ROBAXIN) 750 mg tablet  Self No Yes   Sig: Take 1 tablet (750 mg total) by mouth every 6 (six) hours   omeprazole (PriLOSEC) 40 MG capsule  Self Yes Yes   Sig: Take 80 mg by mouth 2 (two) times a day        Facility-Administered Medications: None       Past Medical History:   Diagnosis Date    Anxiety     Back pain at L4-L5 level     Schreiber esophagus     Bulimia     Disease of thyroid gland     hypothyroid    GERD (gastroesophageal reflux disease)     Hyperlipidemia     Hypothyroidism     Leukopenia     NMS (neuroleptic malignant syndrome) 01/03/2020    Rheumatoid arthritis involving right hip (HCC)     Sciatica     Seizure ([address] )     due to medication mix up 8/2018 only one historically    Synovial cyst of lumbar spine     Vertigo     Weight gain        Past Surgical History:   Procedure Laterality Date    BONE MARROW BIOPSY  BREAST SURGERY      enlargement     COLONOSCOPY      HIP ARTHROPLASTY Right 1/2/2020    Procedure: REVISION TOTAL HIP ARTHROPLASTY WITH REPAIR OF PARIPROSTHETIC FRACTURE - POSTERIOR APPROACH;  Surgeon: Ivanna Lopes MD;  [facility];  Service: Orthopedics    [place] TOTAL HIP ARTHROPLASTY Right 12/11/2019    Procedure: ARTHROPLASTY HIP TOTAL ANTERIOR;  Surgeon: Ivanna Lopes MD;  [facility];  Service: Orthopedics    RHINOPLASTY         Family History   Problem Relation Age of Onset    Uterine cancer Mother     Esophageal cancer Father     Colon cancer Maternal Grandmother      I have reviewed and agree with the history as documented  Social History     Tobacco Use    Smoking status: Never Smoker    Smokeless tobacco: Never Used   Substance Use Topics    Alcohol use: Never     Frequency: Never     Binge frequency: Never    Drug use: No        Review of Systems   Constitutional: Negative for chills, diaphoresis, fatigue and fever  Respiratory: Negative for cough and shortness of breath  Cardiovascular: Negative for chest pain and palpitations  Gastrointestinal: Negative for abdominal distention, abdominal pain, constipation, diarrhea, nausea and vomiting  Genitourinary: Negative for dysuria, frequency and hematuria  Musculoskeletal: Positive for arthralgias, gait problem and myalgias  Negative for neck pain  Neurological: Negative for dizziness, syncope, light-headedness and headaches  All other systems reviewed and are negative        Physical Exam  ED Triage Vitals [01/27/20 0104]   Temperature Pulse Respirations Blood Pressure SpO2   97 5 °F (36 4 °C) 88 18 109/57 97 %      Temp Source Heart Rate Source Patient Position - Orthostatic VS BP Location FiO2 (%)   Oral Monitor Lying Right arm --      Pain Score       4             Orthostatic Vital Signs  Vitals:    01/27/20 0405 01/27/20 0410 01/27/20 0415 01/27/20 0430   BP: 121/64 133/66 118/67 139/75   Pulse: 84 72 78 84   Patient Position - Orthostatic VS: Lying Lying Lying Lying       Physical Exam   Constitutional: She is oriented to person, place, and time  She appears well-nourished  No distress  HENT:   Head: Normocephalic and atraumatic  Right Ear: External ear normal    Left Ear: External ear normal    Mouth/Throat: Oropharynx is clear and moist    Eyes: Pupils are equal, round, and reactive to light  Conjunctivae and EOM are normal  Right eye exhibits no discharge  Left eye exhibits no discharge  No scleral icterus  Neck: Normal range of motion  Neck supple  No JVD present  Cardiovascular: Normal rate, regular rhythm, normal heart sounds and intact distal pulses  Exam reveals no gallop and no friction rub  No murmur heard  Pulmonary/Chest: Effort normal and breath sounds normal  No respiratory distress  She has no wheezes  She has no rales  Abdominal: Soft  Bowel sounds are normal  She exhibits no distension and no mass  There is no tenderness  There is no guarding  Musculoskeletal: She exhibits tenderness  She exhibits no deformity  Right hip: She exhibits decreased range of motion, tenderness and swelling  Legs:  Neurological: She is alert and oriented to person, place, and time  No cranial nerve deficit or sensory deficit  She exhibits normal muscle tone  Coordination normal    Skin: Skin is warm  She is not diaphoretic  Psychiatric: She has a normal mood and affect  Her behavior is normal  Judgment and thought content normal    Vitals reviewed        ED Medications  Medications   propofol (DIPRIVAN) 200 MG/20ML bolus injection 69 mg (69 mg Intravenous Not Given 1/27/20 0410)   acetaminophen (TYLENOL) tablet 650 mg (has no administration in time range)   ascorbic acid (VITAMIN C) tablet 500 mg (has no administration in time range)   ferrous sulfate tablet 325 mg (has no administration in time range)   folic acid (FOLVITE) tablet 1 mg (has no administration in time range)   gabapentin (NEURONTIN) capsule 100 mg (has no administration in time range)   levothyroxine tablet 25 mcg (has no administration in time range)   LORazepam (ATIVAN) tablet 2 mg (has no administration in time range)   methocarbamol (ROBAXIN) tablet 750 mg (has no administration in time range)   multivitamin-minerals (CENTRUM) tablet 1 tablet (has no administration in time range)   pantoprazole (PROTONIX) EC tablet 40 mg (has no administration in time range)   PARoxetine (PAXIL) tablet 10 mg (has no administration in time range)   QUEtiapine (SEROquel) tablet 400 mg (has no administration in time range)   enoxaparin (LOVENOX) subcutaneous injection 40 mg (has no administration in time range)   oxyCODONE (ROXICODONE) IR tablet 5 mg (has no administration in time range)   ondansetron (ZOFRAN) injection 4 mg (has no administration in time range)   ketorolac (TORADOL) injection 15 mg (15 mg Intravenous Given 1/27/20 0210)   sodium chloride 0 9 % bolus 1,000 mL (0 mL Intravenous Stopped 1/27/20 0350)   propofol (DIPRIVAN) 200 MG/20ML bolus injection 69 mg (69 mg Intravenous Given by Other 1/27/20 0401)       Diagnostic Studies  Results Reviewed     Procedure Component Value Units Date/Time    Sedimentation rate, automated [[id number]]     Lab Status:  No result Specimen:  Blood     C-reactive protein [[id number]]     Lab Status:  No result Specimen:  Blood     Platelet count [[id number]]     Lab Status:  No result Specimen:  Blood     Basic metabolic panel [[id number]]  (Abnormal) Collected:  01/27/20 0209    Lab Status:  Final result Specimen:  Blood from Arm, Left Updated:  01/27/20 0238     Sodium 130 mmol/L      Potassium 3 7 mmol/L      Chloride 95 mmol/L      CO2 29 mmol/L      ANION GAP 6 mmol/L      BUN 6 mg/dL      Creatinine 0 46 mg/dL      Glucose 113 mg/dL      Calcium 9 1 mg/dL      eGFR 109 ml/min/1 73sq m     Narrative:       [place] guidelines for Chronic Kidney Disease (CKD):     Stage 1 with normal or high GFR (GFR > 90 mL/min/1 73 square meters)    Stage 2 Mild CKD (GFR = 60-89 mL/min/1 73 square meters)    Stage 3A Moderate CKD (GFR = 45-59 mL/min/1 73 square meters)    Stage 3B Moderate CKD (GFR = 30-44 mL/min/1 73 square meters)    Stage 4 Severe CKD (GFR = 15-29 mL/min/1 73 square meters)    Stage 5 End Stage CKD (GFR <15 mL/min/1 73 square meters)  Note: GFR calculation is accurate only with a steady state creatinine    CBC and differential [[id number]]  (Abnormal) Collected:  01/27/20 0209    Lab Status:  Final result Specimen:  Blood from Arm, Left Updated:  01/27/20 0224     WBC 5 47 Thousand/uL      RBC 3 41 Million/uL      Hemoglobin 10 4 g/dL      Hematocrit 31 6 %      MCV 93 fL      MCH 30 5 pg      MCHC 32 9 g/dL      RDW 17 5 %      MPV 8 6 fL      Platelets 846 Thousands/uL      nRBC 1 /100 WBCs      Neutrophils Relative 83 %      Immat GRANS % 1 %      Lymphocytes Relative 8 %      Monocytes Relative 7 %      Eosinophils Relative 1 %      Basophils Relative 0 %      Neutrophils Absolute 4 54 Thousands/µL      Immature Grans Absolute 0 03 Thousand/uL      Lymphocytes Absolute 0 45 Thousands/µL      Monocytes Absolute 0 40 Thousand/µL      Eosinophils Absolute 0 04 Thousand/µL      Basophils Absolute 0 01 Thousands/µL                  XR hip/pelv 2-3 vws right    (Results Pending)   XR hip/pelv 1 vw right if performed    (Results Pending)         Procedures  Procedures  Conscious Sedation Assessment      Classification Score   ASA Scale Assessment  2-Mild to moderate systemic disease, medically well controlled, with no functional limitation filed at 01/27/2020 0345          ED Course                               MDM  Number of Diagnoses or Management Options  Ambulatory dysfunction:   Dislocation of right hip [facility]):   Fall:   Diagnosis management comments: Hip was put back into position by Orthopedic surgery  Afterwards the patient was still unable to ambulate    Given her home condition she can not return there currently  She will be admitted for ambulatory dysfunction and likely placement for rehab or another facility  Patient was admitted to [facility]  Disposition  Final diagnoses:   Ambulatory dysfunction   Fall   Dislocation of right hip [facility])     Time reflects when diagnosis was documented in both MDM as applicable and the Disposition within this note     Time User Action Codes Description Comment    1/27/2020  4:50 AM Hyun Hodge Add [R26 2] Ambulatory dysfunction     1/27/2020  4:50 AM Dixie Chowdhury Add [E99  XXXA] Fall     1/27/2020  4:51 AM Dixie Chowdhury Add [V12 175B] Dislocation of right hip [facility])       ED Disposition     ED Disposition Condition Date/Time Comment    Admit Stable Mon Jan 27, 2020  4:50 AM Case was discussed with CHERYL and the patient's admission status was agreed to be Admission Status: observation status to the service of Dr Jocelin Mcneal   Follow-up Information    None         Patient's Medications   Discharge Prescriptions    No medications on file     No discharge procedures on file  ED Provider  Attending physically available and evaluated Olga Arciniega I managed the patient along with the ED Attending      Electronically Signed by         Fadi Lewis MD  01/27/20 7458

## 2020-01-27 NOTE — PLAN OF CARE
Problem: PHYSICAL THERAPY ADULT  Goal: Performs mobility at highest level of function for planned discharge setting  See evaluation for individualized goals  Description  Treatment/Interventions: Functional transfer training, LE strengthening/ROM, Therapeutic exercise, Endurance training, Bed mobility, Gait training, Spoke to case management, OT  Equipment Recommended: (none, pt has all DME )       See flowsheet documentation for full assessment, interventions and recommendations  Note:   Prognosis: Good  Problem List: Decreased strength, Decreased endurance, Impaired balance, Decreased mobility, Decreased safety awareness, Orthopedic restrictions  Assessment: Pt is a pleasant 62 yo female admitted after sustaining a fall out of bed resulting in R hip dislocation s/p reduction in ED by ortho  Pt s/p ELIZABETH in December 0698, complicated by fall earlier this month resulting in periprosthetic fracture  Pt D/C'd from rehab 2 days prior to ED arrival   Pt resides with elderly mother, per medical record, EMS indicated unsafe living environment  Pt reports being unable to use RW in home due to limited space, has been using quad cane for ambulation within the home  Currently, pt requires modA for bed mobility, Sal for transfers and Sal for ambulation 80 ft with RW  Pt unsafe to D/C home given inability to demonstrate hip precautions with mobility, inability to don/doff hip abduction pillow and elderly mother unable to assist, reliance on RW  Recommend continued therapy in IP rehab upon hospital D/C, communicated with CM  Barriers to Discharge: Inaccessible home environment, Decreased caregiver support  Barriers to Discharge Comments: no clear pathway for RW use, no caregiver support for abd pillow   Recommendation: Post acute IP rehab     PT - OK to Discharge: Yes(to IP rehab when medically stable)    See flowsheet documentation for full assessment

## 2020-01-27 NOTE — PROGRESS NOTES
[facility]    Progress Note - Dianna Body 1960, 61 y o  female MRN: [id number]    Unit/Bed#: ED 03 Encounter: [id number]    Primary Care Provider: Fran Huertas MD   Date and time admitted to hospital: 1/27/2020 12:50 AM    * Right hip pain  Assessment & Plan  · Patient presented after mechanical fall at home  Patient with recent hip replacement December 2019 with revision earlier this month after she sustained periprosthetic fracture  Presented after a fall at home, after which she dislocated her right hip  Noted to have right hip dislocation status post successful close reduction in the emergency department by orthopedic surgery  · Weight-bearing as tolerated  · Hip abduction pillow at all times while in chair or in bed  · Okay for discharge from Ortho perspective, outpatient follow-up with Dr Clover Rand  · P r n  Analgesia  · Await PT/OT recommendations    Fall at home  Assessment & Plan  · Mechanical fall  · Plan as above  · Await PT/OT recommendations    Hyponatremia  Assessment & Plan  · Sodium 130 on admission  Appears dry on exam, was given 1 L intravenous fluid bolus  · Will repeat BMP now   Further plan based on updated result  Acquired hypothyroidism  Assessment & Plan  · Continue levothyroxine    Anxiety  Assessment & Plan  · Stable  · Continue home medications    Schizoaffective disorder, depressive type ([facility] [address] )  Assessment & Plan  · Currently stable  Continue Seroquel 400 mg at bedtime --this dose was confirmed with patient by admitting provider  · Would avoid further escalation given patient's recent history of NMS versus serotonin syndrome  · Geodon discontinued last admission  · Continue Paxil and Ativan PRN    GERD (gastroesophageal reflux disease)  Assessment & Plan  · Protonix b i d      Iron deficiency anemia secondary to inadequate dietary iron intake  Assessment & Plan  · Hemoglobin stable  · Continue iron supplementation  · Monitor     Lumbar radiculopathy  Assessment & Plan  · Continue gabapentin    Problem related to living arrangement  Assessment & Plan  · Per EMS, concern for unsure living environment with possible hoarding  Patient lives with 80year old mother who is currently home alone  · CM consult pending, will d/w   Pharmacologic: Enoxaparin (Lovenox)  Mechanical VTE Prophylaxis in Place: Yes      Discussions with Specialists or Other Care Team Provider: nursing, case management     Education and Discussions with Family / Patient: patient     Time Spent for Care: 30 minutes  More than 50% of total time spent on counseling and coordination of care as described above  Current Length of Stay: 0 day(s)    Current Patient Status: Observation   Certification Statement: keep obs pending improvement of NA level, PT/OT evaluation, Cm consult    Discharge Plan / Estimated Discharge Date:  Not medically stable, if sodium level does not improve or patient will require additional midnight for safe discharge planning, will plan to change to inpatient  Code Status: Level 1 - Full Code      Subjective:   Patient denies any pain  She has not been up and ambulating it  She states she lives at home with her 80-year-old mother who is currently alone  Complains of a dry mouth which she attributes to her psychiatric medications  Objective:     Vitals:   Temp (24hrs), Av 5 °F (36 4 °C), Min:97 5 °F (36 4 °C), Max:97 5 °F (36 4 °C)    Temp:  [97 5 °F (36 4 °C)] 97 5 °F (36 4 °C)  HR:  [68-88] 86  Resp:  [16-20] 16  BP: (101-139)/(57-77) 118/61  SpO2:  [97 %-100 %] 97 %  Body mass index is 27 1 kg/m²  Input and Output Summary (last 24 hours): Intake/Output Summary (Last 24 hours) at 2020 2641  Last data filed at 2020 0350  Gross per 24 hour   Intake 1000 ml   Output    Net 1000 ml       Physical Exam:     Physical Exam   Constitutional: She is oriented to person, place, and time  No distress     HENT:   Head: Normocephalic  Eyes: Conjunctivae are normal    Neck: Normal range of motion  Cardiovascular: Normal rate  Pulmonary/Chest: Breath sounds normal    Abdominal: Bowel sounds are normal    Musculoskeletal: She exhibits tenderness  Edema: right hip  Hip abductor pillow in place   Neurological: She is alert and oriented to person, place, and time  Skin: Skin is warm  There is pallor  Psychiatric: She has a normal mood and affect  Nursing note and vitals reviewed        Additional Data:     Labs:    Results from last 7 days   Lab Units 01/27/20  0700 01/27/20  0209   WBC Thousand/uL  --  5 47   HEMOGLOBIN g/dL  --  10 4*   HEMATOCRIT %  --  31 6*   PLATELETS Thousands/uL 267 258   NEUTROS PCT %  --  83*   LYMPHS PCT %  --  8*   MONOS PCT %  --  7   EOS PCT %  --  1     Results from last 7 days   Lab Units 01/27/20  0209   POTASSIUM mmol/L 3 7   CHLORIDE mmol/L 95*   CO2 mmol/L 29   BUN mg/dL 6   CREATININE mg/dL 0 46*   CALCIUM mg/dL 9 1             Recent Cultures (last 7 days):           Last 24 Hours Medication List:     Current Facility-Administered Medications:  acetaminophen 650 mg Oral Q6H PRN [facility], DO   ascorbic acid 500 mg Oral BID Lucinda Quivers Veres, DO   enoxaparin 40 mg Subcutaneous Daily Lucinda Quivers Veres, DO   ferrous sulfate 325 mg Oral BID With Meals [facility], DO   folic acid 1 mg Oral Daily Lucinda Quivers Veres, DO   gabapentin 100 mg Oral Q8H [facility], DO   levothyroxine 25 mcg Oral Early Morning Lucinda Quivers Veres, DO   LORazepam 2 mg Oral Q8H PRN [facility], DO   methocarbamol 750 mg Oral Q6H [facility], DO   multivitamin-minerals 1 tablet Oral Daily Lucinda Quivers Veres, DO   ondansetron 4 mg Intravenous Q6H PRN [facility], DO   oxyCODONE 5 mg Oral Q6H PRN Lucinda Quivers Veres, DO   pantoprazole 40 mg Oral BID AC Lucinda Quivers Veres, DO   PARoxetine 10 mg Oral Daily Lucinda Quivers Veres, DO   propofol 1 mg/kg Intravenous Once Lucinda Quivers Veres, DO   QUEtiapine 400 mg Oral HS Danita Yip,         Today, Patient Was Seen By: ABENA Chavez    ** Please Note: Dragon 360 Dictation voice to text software may have been used in the creation of this document   **

## 2020-01-27 NOTE — SOCIAL WORK
TC from [facility] with [facility]  As per Lety pt was set up with [facility]  CM met with pt and informed her she was set up with [facility] when she d/c from [facility]  Pt agreeable to resume  CM sent referral via [address]  TC [facility]  Irene informed CM that she gave wrong information  As per Tejas Santos since pt was admitted for medical reason and not MH and has no MH concerns since d/c from [facility] on Saturday the determination letter can be used  Tejas Santos to fax confirmation to CM  Pt set up with [facility] Tracie Plascencia

## 2020-01-27 NOTE — ED NOTES
Case management informed that pt  Keeps requesting to leave  They are looking into rehab but need approval from [facility] so it may take some time to get all the arrangements and documents in order       Xuan Read RN  01/27/20 1300

## 2020-01-27 NOTE — SOCIAL WORK
[facility]  As per [employer] she is requesting PT/OT evaluate pt due to being d/c from rehab on Saturday  PT/OT able to evaluate pt and informed CM that pt is recommended to go to rehab  CM met with pt at bedside  Pt reports she prefers to go home but will think about it  Pt informed CM she did like [facility] and is agreeable to CM sending a referral  Pt aware she is recommended to use the RW and would need assistance to place the hip wedge  As per pt she is unable to use the RW in her home due ot the hoarding and her mother is unable to assist with the hip wedge - pt can't place herself due to hip precautions  CM reviewed these as safety concerns if she refuses recommendation for rehab and returns home  CM to f/u with pt on rehab  [facility] referral made to [facility]  CM spoke with Lety from [facility] - they are willing to accept pt back  Lety informed CM she would need to confirm with [facility] and [address] [facility] that CM is able to use same letters for placement  As per Lety she will submit for authorization     CM left  for [address] [facility] Coordinator Supervisor [phone] and for [facility] on [address] [phone] requesting return call  Pt reports she believes her mother, Shaheen Alfredo, has a  [facility]  CM informed pt that CM would be calling in report due to home conditions and potential need for her mother needing more help in the home with pt potential return to rehab  TC to [facility] protective services  CM informed they would call CM back to take reports  CM provided direct phone number  Update:  CM met with pt to f/u on STR  Pt is agreeable and is aware [facility] is able to offer bed  CM informed pt that authorization has been started  CM updated ABENA Álvarez of same      CM waiting on return call from [facility] or [facility] to determine if CM able to use determination letter from 12/19  Acceptance to [facility] is pending authorization and confirmation of determination letter  If able to use determination letter CM will complete new PASSR that indicates such  Update:  YASMIN received TC from [address] on [place] - as per Jeffery Lerma CM would need to confirm with [facility] that same determination letter can be used for placement since pt was d/c to the [facility]  TC to [phone]  CM left another  requesting return call regarding pt determination letter for SNF placement

## 2020-01-27 NOTE — DISCHARGE SUMMARY
Discharge Summary - [facility] Medicine    Patient Information: Lavon Lopez 61 y o  female MRN: [id number]  Unit/Bed#: ED 03 Encounter: [id number]    Discharging Physician / Practitioner: ABENA Borges  PCP: Herman Bo MD  Admission Date: 1/27/2020  Discharge Date: 01/27/20    Reason for Admission:  Right hip pain following mechanical fall    Diagnosis at discharge:  Right hip dislocation status post closed reduction by Orthopedic surgery    Discharge Diagnoses:     Principal Problem:    Right hip pain  Active Problems:    Fall at home    Hyponatremia    Acquired hypothyroidism    Anxiety    Schizoaffective disorder, depressive type ([address] )    GERD (gastroesophageal reflux disease)    Iron deficiency anemia secondary to inadequate dietary iron intake    Lumbar radiculopathy    Problem related to living arrangement  Resolved Problems:    * No resolved hospital problems  *      Consultations During Hospital Stay:  · Orthopedics    Procedures Performed:     · Closed reduction right hip     Significant Findings / Test Results:     · X-ray hip and pelvis:  Dislocation of the right hip arthroplasty  No acute displaced fractures  · Repeat x-ray successful reduction of right total hip arthroplasty  · BMP on arrival showed sodium 130, improved to 136  Incidental Findings:   · None     Test Results Pending at Discharge (will require follow up): · None     Outpatient Tests Requested:  · Outpatient follow-up with PCP and Orthopedic surgery    Complications:  None    Hospital Course:     Lavon Lopez is a 61 y o  female patient with past medical history of schizoaffective disorder, anxiety, hypothyroidism, GERD, iron deficiency anemia who originally presented to the hospital on 1/27/2020 due to right hip pain following a mechanical fall  X-ray showed dislocation of right prosthetic hip    Orthopedic surgery was consulted and patient underwent successful closed reduction in the emergency department  She is currently with a hip abductor pillow  She was seen in consultation by PT/OT who recommended rehab however patient ultimately refused this plan  There was also mention from the EMS personnel that there was significant concerns about environmental safety given the condition of patient's home  Patient does live at home with her 22-year-old mother and the area on aging was made aware of concerns  Patient is medically stable for discharge  She should follow-up with her PCP as well as Orthopedic surgery in 1-2 weeks  Case management has arranged home visiting nurses as well as PT and OT  Patient states that she does have a rolling walker at home  I did speak with the patient in the presence of case management to attempt to get patient to be agreeable to rehab however patient states that she has things at home that I would need to take care of but would not disclose to myself or case management any further details  Condition at Discharge: stable     Discharge Day Visit / Exam:     * Please refer to separate progress note for these details *    Discussion with Family:  Patient      Discharge instructions/Information to patient and family:   See after visit summary for information provided to patient and family  Provisions for Follow-Up Care:  See after visit summary for information related to follow-up care and any pertinent home health orders  Disposition:     Home with [facility] (Reminder: Complete face to face encounter)    For Discharges to [facility] SNF:   · Not Applicable to this Patient - Not Applicable to this Patient    Planned Readmission: no    Discharge Statement:  I spent 90 minutes discharging the patient  This time was spent on the day of discharge  I had direct contact with the patient on the day of discharge   Greater than 50% of the total time was spent examining patient, answering all patient questions, arranging and discussing plan of care with patient as well as directly providing post-discharge instructions  Additional time then spent on discharge activities  Discharge Medications:  See after visit summary for reconciled discharge medications provided to patient and family  ** Please Note: This note has been constructed using a voice recognition system   **

## 2020-01-27 NOTE — H&P
H&P- Cuauhtemoc Khanna 1960, 61 y o  female MRN: [id number]    Unit/Bed#: ED 03 Encounter: [id number]    Primary Care Provider: Kendal Lebron MD   Date and time admitted to hospital: 1/27/2020 12:50 AM        * Fall at home  Assessment & Plan  Patient had a mechanical fall at home, dislocated her right hip prosthesis, was placed back by Ortho  Per EMS, there is unsafe home situation with possible hoarding  Patient lives at home with her 80-year-old mother  · Will consult PT and OT  · P r n  Pain regimen  · Consult case management  · Ortho consult    Hyponatremia  Assessment & Plan  Sodium 130 on admission  Patient is not acutely symptomatic  She has been hyponatremic in the past   · Trend sodium, repeat BMP    GERD (gastroesophageal reflux disease)  Assessment & Plan  Protonix b i d  Iron deficiency anemia secondary to inadequate dietary iron intake  Assessment & Plan  · Hemoglobin stable  · Monitor hemoglobin  · Continue iron supplementation    Right hip pain  Assessment & Plan  Presented after a fall at home, after which she dislocated her right hip  This is placed back in place by Ortho  Of note, she had hip replacement in December of 2019 with revision this month after she sustained a periprosthetic fracture  · Tylenol p r n   · Oxycodone p r n  · Will consult Ortho  · Recommend continued outpatient follow-up    Schizoaffective disorder, depressive type ([address] )  Assessment & Plan  · Continue Seroquel 400 mg at bedtime (confirmed this dose with patient)  · Would avoid further escalation given patient's recent history of NMS versus serotonin syndrome  · Continue to hold Geodon    Acquired hypothyroidism  Assessment & Plan  · Continue levothyroxine    Anxiety  Assessment & Plan  · Continue Ativan p r n  Every 8 hours  · Paroxetine daily    Lumbar radiculopathy  Assessment & Plan  · Continue gabapentin  · P r n   Tylenol        VTE Prophylaxis: Enoxaparin (Lovenox)  / sequential compression device   Code Status:  level 1-full code  POLST: POLST form is not discussed and not completed at this time  Anticipated Length of Stay:  Patient will be admitted on an Observation basis with an anticipated length of stay of < 2 midnights  Justification for Hospital Stay: Please see detailed plans noted above  Chief Complaint:     Fall at home    History of Present Illness:  Rajeev Lucero is a 61 y o  female who presents with fall at home  Patient has a past medical history of anxiety, schizoaffective disorder, hyperlipidemia, and hypothyroidism  Recent history also includes right hip fracture with replacement, and revision of periprosthetic fracture earlier this month  She has presents to the hospital multiple times now since last month with fractures and falls  She now presents tonight after a fall at home as she was attempting to get back in her bed, when she ended up falling and sliding to the ground  She fell on her right hip and had acute onset right hip pain  She was unable to get up and ambulate  She is brought to the ED by EMS  Imaging revealed a dislocation of her right hip prosthesis  This was placed back by Orthopedic surgery  However, EMS had reported that patient had a potentially unsafe home situation with concern for hoarding  She lives at home with her 15-year-old mother  Workup in the ED also revealed hyponatremia which is new for her, and anemia which is chronic  Of note, on her previous hospitalization, she had an episode of rigidity, fevers, and encephalopathy which was concerning for neuroleptic malignant syndrome versus serotonin syndrome and required a brief stay in the MICU  During this time, patient has been taking Seroquel and Geodon, but there was suspicion that she was not being compliant with her medications  When restarted on these, she developed symptoms concerning for NMS versus SS    Her Geodon was stopped in her Seroquel was reduced, with instructions to gradually increase as an outpatient until she reaches 400 mg again  Per patient, she is back on 400 mg  Currently, patient is currently awake, alert, in no acute distress  She denies pain currently  She denies hitting her head or losing consciousness before and after her fall  She has not attempted to ambulate yet  Review of Systems   Constitutional: Negative for activity change, chills and fever  HENT: Negative  Negative for hearing loss, sore throat and trouble swallowing  Eyes: Negative for pain and visual disturbance  Respiratory: Negative for cough, shortness of breath and wheezing  Cardiovascular: Negative for chest pain, palpitations and leg swelling  Gastrointestinal: Negative for abdominal distention, abdominal pain, blood in stool, constipation, diarrhea, nausea and vomiting  Endocrine: Negative  Genitourinary: Negative for dysuria, frequency and hematuria  Musculoskeletal: Positive for arthralgias  Negative for back pain, gait problem, joint swelling and myalgias  Skin: Negative  Allergic/Immunologic: Negative for immunocompromised state  Neurological: Negative for dizziness, syncope, facial asymmetry, speech difficulty, weakness, light-headedness, numbness and headaches  Hematological: Negative  Psychiatric/Behavioral: Negative for behavioral problems and confusion           Past Medical and Surgical History:   Past Medical History:   Diagnosis Date    Anxiety     Back pain at L4-L5 level     Schreiber esophagus     Bulimia     Disease of thyroid gland     hypothyroid    GERD (gastroesophageal reflux disease)     Hyperlipidemia     Hypothyroidism     Leukopenia     NMS (neuroleptic malignant syndrome) 01/03/2020    Rheumatoid arthritis involving right hip (HCC)     Sciatica     Seizure ([facility] [address] )     due to medication mix up 8/2018 only one historically    Synovial cyst of lumbar spine     Vertigo     Weight gain      Past Surgical History:   Procedure Laterality Date    BONE MARROW BIOPSY      BREAST SURGERY      enlargement     COLONOSCOPY      HIP ARTHROPLASTY Right 1/2/2020    Procedure: REVISION TOTAL HIP ARTHROPLASTY WITH REPAIR OF PARIPROSTHETIC FRACTURE - POSTERIOR APPROACH;  Surgeon: Hemant Martin MD;  [facility]: [facility];  Service: Orthopedics    NM TOTAL HIP ARTHROPLASTY Right 12/11/2019    Procedure: ARTHROPLASTY HIP TOTAL ANTERIOR;  Surgeon: Hemant Martin MD;  Location: [facility];  Service: Orthopedics    RHINOPLASTY         Meds/Allergies:    (Not in a hospital admission)    Allergies:    Allergies   Allergen Reactions    Risperdal [Risperidone] Shortness Of Breath     Rapid heart beat, SOB    Zyprexa [Olanzapine] Shortness Of Breath     Rapid heartbeat    Latex Rash     Band aids, adhesives wears normal underwear, can eat bananas  USE PAPER TAPE     History:  Marital Status: Single   Occupation:  None  Patient Pre-hospital Living Situation: Home with mother  Patient Pre-hospital Level of Mobility: ambulatory  Patient Pre-hospital Diet Restrictions: n/a  Substance Use History:   Social History     Substance and Sexual Activity   Alcohol Use Never    Frequency: Never    Binge frequency: Never     Social History     Tobacco Use   Smoking Status Never Smoker   Smokeless Tobacco Never Used     Social History     Substance and Sexual Activity   Drug Use No       Family History:  Family History   Problem Relation Age of Onset    Uterine cancer Mother     Esophageal cancer Father     Colon cancer Maternal Grandmother        Physical Exam:     Vitals:   Blood Pressure: 139/75 (01/27/20 0430)  Pulse: 84 (01/27/20 0430)  Temperature: 97 5 °F (36 4 °C) (01/27/20 0104)  Temp Source: Oral (01/27/20 0104)  Respirations: 17 (01/27/20 0430)  Height: 5' 3" (160 cm) (01/27/20 0345)  Weight - Scale: 69 4 kg (153 lb) (01/27/20 0345)  SpO2: 99 % (01/27/20 0430)    Constitutional:  Well developed, well nourished, no acute distress, non-toxic appearance   Eyes:  PERRL, conjunctiva normal   HENT:  Atraumatic, external ears normal, nose normal, oropharynx moist, no pharyngeal exudates  Neck - normal range of motion, no tenderness, supple   Respiratory:  No respiratory distress  Normal breath sounds  No rales, no wheezing   Cardiovascular:  Normal rate, normal rhythm, no murmurs, no gallops, no rubs   GI:  Soft, nondistended, normal bowel sounds, nontender, no organomegaly, no mass, no rebound, no guarding   :  No costovertebral angle tenderness  Musculoskeletal:  No edema, no tenderness, no deformities  Back - no tenderness  Hip immobilizer in place  Integument:  Well hydrated, no rash   Lymphatic:  No lymphadenopathy noted   Neurologic:  Alert & awake, communicative, CN 2-12 normal, normal motor function, normal sensory function, no focal deficits noted   Psychiatric:  Speech and behavior appropriate       Lab Results: I have personally reviewed pertinent reports  Results from last 7 days   Lab Units 01/27/20  0209   WBC Thousand/uL 5 47   HEMOGLOBIN g/dL 10 4*   HEMATOCRIT % 31 6*   PLATELETS Thousands/uL 258   NEUTROS PCT % 83*   LYMPHS PCT % 8*   MONOS PCT % 7   EOS PCT % 1     Results from last 7 days   Lab Units 01/27/20  0209   POTASSIUM mmol/L 3 7   CHLORIDE mmol/L 95*   CO2 mmol/L 29   BUN mg/dL 6   CREATININE mg/dL 0 46*   CALCIUM mg/dL 9 1           EKG:  Not on file    Imaging: I have personally reviewed pertinent reports  and I have personally reviewed pertinent films in PACS    Xr Chest Portable    Result Date: 1/3/2020  Narrative: CHEST INDICATION:   SOB  COMPARISON:  May 20, 2019  EXAM PERFORMED/VIEWS:  XR CHEST PORTABLE  AP semierect FINDINGS:  Monitoring leads and clips project over the chest  Cardiomediastinal silhouette appears unremarkable  The lungs are clear  No pneumothorax or pleural effusion  Osseous structures appear within normal limits for patient age  Impression: No evidence of acute pulmonary disease  Workstation performed: [id number]     Xr Hip/pelv 2-3 Vws Right If Performed    Result Date: 1/6/2020  Narrative: RIGHT HIP INDICATION:   post op evaluation of fixation  COMPARISON:  None VIEWS:  XR HIP/PELV 2-3 VWS RIGHT W PELVIS IF PERFORMED FINDINGS: Stable appearance of the right hip status post total hip arthroplasty and ORIF of a proximal periprosthetic femoral fracture  No evidence for hardware failure  No lytic or blastic osseous lesions  Skin staples are noted  Degenerative changes visualized lower lumbar spine  Impression: Postoperative right hip and femur is stable from the previous study Workstation performed: [id number]     Xr Hip/pelv 2-3 Vws Right If Performed    Result Date: 1/6/2020  Narrative: RIGHT HIP INDICATION:   post op  COMPARISON:  1/2/2020 VIEWS:  XR HIP/PELV 2-3 VWS RIGHT W PELVIS IF PERFORMED Images: 5 FINDINGS: Right hip revised arthroplasty noted status post repair of periprosthetic fracture  No acute fracture or hardware failure  No significant hip degenerative changes  No lytic or blastic osseous lesions  Postoperative changes are seen in the adjacent soft tissues  There is abundant fecal debris throughout the colon and rectum  The visualized lumbar spine is unremarkable  Impression: Unremarkable appearance of revised right total hip arthroplasty  Workstation performed: [id number]     Xr Hip/pelv 2-3 Vws Right If Performed    Result Date: 1/3/2020  Narrative: RIGHT HIP PORTABLE INDICATION:   Closed right hip fracture ([address] ) [[id number] 001A]  Intraoperative study, revision total right hip arthroplasty with repair of periprosthetic fracture  COMPARISON:  December 28, 2019  VIEWS:  XR HIP/PELV 2-3 VWS RIGHT W PELVIS IF PERFORMED 5 portable images of the right hip and proximal to mid right femur obtained intraoperatively with the C-arm are submitted  The fluoroscopy time was 45 2 seconds  FINDINGS: A right total hip replacement is present    There is also a lateral plate and screw device along the lateral aspect of the proximal to mid right femur  Three cerclage wires are present surrounding the proximal shaft of the right femur  The previously described fracture of the proximal right femur demonstrates improved alignment when compared to the prior study  Impression: Status post ORIF proximal right femoral fracture  A right total hip replacement and lateral plate and screw device with cerclage wires is present, as described above  Please see discussion  The images are available for clinical review  Workstation performed: [id number]     Xr Hip/pelv 2-3 Vws Right If Performed    Addendum Date: 12/29/2019 Addendum:   ADDENDUM: There is a dictation all error in the findings section of the initial report  The second paragraph should read: "Intertrochanteric fracture AT THE LEVEL of the central portion of the femoral stem " To clarify: This is a periprosthetic femur fracture and not a fracture of the hardware, itself  Result Date: 12/29/2019  Narrative: RIGHT HIP INDICATION:   hip pain  COMPARISON:  None VIEWS:  XR HIP/PELV 2-3 VWS RIGHT W PELVIS IF PERFORMED FINDINGS: Prior total hip arthroplasty  Components remain well aligned without dislocation    No lucencies around the hardware  Intertrochanteric fracture of the central portion of the femoral stem  Mild apex-ventral angulation  Fragmentation at the acetabulum is chronic  Hazy soft tissue calcification posterior proximal thigh are chronic  No other acute fractures  No focal lytic or blastic lesions  Normal alignment at the sacral iliac joints and symphysis  No cellulitis  Normal alignment the left hip without significant degenerative changes  The ventricular pelvis  Soft tissue swelling around the fracture  Impression: Oblique intertrochanteric fracture around the femoral stem of the patient's hip arthroplasty with mild apex- ventral angulation  The study was marked in [facility] for immediate notification   Workstation performed: [id number]     Ct Head Wo Contrast    Result Date: 1/3/2020  Narrative: CT BRAIN - WITHOUT CONTRAST INDICATION:   Altered mental status  COMPARISON:  CT brain 8/13/2018 TECHNIQUE:  CT examination of the brain was performed  In addition to axial images, coronal 2D reformatted images were created and submitted for interpretation  Radiation dose length product (DLP) for this visit:  916 56 mGy-cm   This examination, like all CT scans performed in the [facility], was performed utilizing techniques to minimize radiation dose exposure, including the use of iterative  reconstruction and automated exposure control  IMAGE QUALITY:  Diagnostic  FINDINGS: PARENCHYMA:  No intracranial mass, mass effect or midline shift  No CT signs of acute infarction  No acute parenchymal hemorrhage  VENTRICLES AND EXTRA-AXIAL SPACES:  Normal for the patient's age  VISUALIZED ORBITS AND PARANASAL SINUSES:  Unremarkable  CALVARIUM AND EXTRACRANIAL SOFT TISSUES:  Normal      Impression: No acute intracranial abnormality  Workstation performed: [id number]     Ct Femur Right Wo Contrast    Result Date: 12/31/2019  Narrative: CT right femur without IV contrast INDICATION: Fracture, femur  COMPARISON: Plain film from 12/28/2019 and CT of the right hip from August 23, 2019 TECHNIQUE: CT examination of the right femur was performed  This examination, like all CT scans performed in the [facility], was performed utilizing techniques to minimize radiation dose exposure, including the use of iterative reconstruction and automated exposure control software  Sagittal and coronal two dimensional reconstructed images were also submitted for interpretation  Rad dose  556 mGy-cm FINDINGS: OSSEOUS STRUCTURES:  Included portion of the right hemipelvis is intact  As a prosthetic right hip joint    The acetabular component appears well-seated in the bone with pre-existing subcortical degenerative cyst in the native acetabulum visible adjacent  to the hardware  There is no dislocation of the prosthesis  There is acute obliquely oriented trochanteric level fracture of the right femur around the region of the femoral stem of the prosthesis  The hardware is not significantly displaced from the bone  Interestingly, within the soft tissues immediately surrounding the fracture, there seems to be some calcification  It is surprising that it has developed so quickly  Below the level of the femoral stem, the remainder of the femoral shaft is intact and normal-appearing  The knee joint is normal  VISUALIZED MUSCULATURE:  There seems to be diminished attenuation of the vastus intermedius which would suggest some muscular edema presumably due to the trauma  Remaining musculature seems to be within normal limits other than the presence of the above-mentioned calcifications around the site of the fracture  SOFT TISSUES:  There is subcutaneous edema and some fluid along the anterior lateral aspect of the hip area  The remaining soft tissues are unremarkable  The included portion of the pelvis soft tissues seem to be normal   Lateral well-distended with urine  OTHER PERTINENT FINDINGS:  None  Impression: Obliquely oriented almost nondisplaced proximal femoral shaft fracture around the site of the femoral stem without significant displacement of the hardware  Moderate amount of soft tissue calcification in the vicinity of the injury  Diminished density of the vastus intermedius muscle probably representing some muscular edema in the deep compartment  No dislocation at the hip  Workstation performed: [id number]         Portions of the record may have been created with voice recognition software  Occasional wrong word or "sound a like" substitutions may have occurred due to the inherent limitations of voice recognition software  Read the chart carefully and recognize, using context, where substitutions have occurred

## 2020-01-27 NOTE — ED NOTES
Case Management at bedside with pt  Preeti from [facility] contacted so pt can speak to both DR and Case Management in regards to being Discharged         Trey Garcia  01/27/20 0429

## 2020-01-27 NOTE — OCCUPATIONAL THERAPY NOTE
Occupational Therapy Evaluation      Palak Smith    1/27/2020    Principal Problem:    Right hip pain  Active Problems:    Lumbar radiculopathy    Anxiety    Acquired hypothyroidism    Schizoaffective disorder, depressive type (HCC)    Iron deficiency anemia secondary to inadequate dietary iron intake    Fall at home    GERD (gastroesophageal reflux disease)    Hyponatremia    Problem related to living arrangement      Past Medical History:   Diagnosis Date    Anxiety     Back pain at L4-L5 level     Schreiber esophagus     Bulimia     Disease of thyroid gland     hypothyroid    GERD (gastroesophageal reflux disease)     Hyperlipidemia     Hypothyroidism     Leukopenia     NMS (neuroleptic malignant syndrome) 01/03/2020    Rheumatoid arthritis involving right hip ([facility] [address] [facility]     Sciatica     Seizure ([facility] [address] )     due to medication mix up 8/2018 only one historically    Synovial cyst of lumbar spine     Vertigo     Weight gain        Past Surgical History:   Procedure Laterality Date    BONE MARROW BIOPSY      BREAST SURGERY      enlargement     COLONOSCOPY      HIP ARTHROPLASTY Right 1/2/2020    Procedure: REVISION TOTAL HIP ARTHROPLASTY WITH REPAIR OF PARIPROSTHETIC FRACTURE - POSTERIOR APPROACH;  Surgeon: Ana María Bedolla MD;  [facility];  Service: Orthopedics    [place] TOTAL HIP ARTHROPLASTY Right 12/11/2019    Procedure: ARTHROPLASTY HIP TOTAL ANTERIOR;  Surgeon: Ana María Bedolla MD;  [facility];  Service: Orthopedics    RHINOPLASTY          01/27/20 1015   Note Type   Note type Eval/Treat   Restrictions/Precautions   Weight Bearing Precautions Per Order Yes   RLE Weight Bearing Per Order WBAT  (per ortho note)   Braces or Orthoses Other (Comment)  (hip abduction pillow )   Other Precautions THR;WBS;Chair Alarm;Cognitive;Multiple lines;Telemetry; Fall Risk   Pain Assessment   Pain Assessment No/denies pain   Pain Score No Pain   Home Living   Type of [address] [facility] level;Performs ADLs on one level;Stairs to enter with rails  ([facility]; 2STE w/o HR)   Bathroom Shower/Tub Tub/shower unit  (sponge bathes PTA)   Bathroom Toilet Standard   Bathroom Equipment Commode; Shower chair;Grab bars in shower   [address] Walker;Quad cane   Additional Comments Pt reports use of quad cane for household mobility 2' "there isn't a clear walkway to use my walker " Per CM, Pt has questionable unsafe living environment due to possible hoarding situation  Pt reports she was recently D/C'd from rehab stay and was home for 2 days prior to current admit  Prior Function   Level of [place] Independent with ADLs and functional mobility   Lives With Family  (81 yo mother)   Receives Help From [facility]   IADLs Needs assistance   Falls in the last 6 months 1 to 4  (2, per Pt)   Vocational Retired   Lifestyle   Autonomy Pt reports being I w/ all ADLS and mother A w/ IADLS; (-) drives; reports neighbors having been A w/ grocery shopping; ambulates w/ quad cane in her home and utilizes RW for ambulating in/out of her home PTA   Reciprocal Relationships Pt lives w/ her 79yo mother  Pt reports mother requires A at baseline and currently her neighbors are providing A while Pt is in the hospital  Pt reports her mother is unable to provide physical A if needed     Service to Others Pt is retired   Intrinsic Gratification Pt reports enjoying being home   Psychosocial   Psychosocial (WDL) X   Patient Behaviors/Mood Flat affect   Subjective   Subjective "I slid off of my bed"   ADL   Where Assessed Edge of bed   Eating Assistance 5  Supervision/Setup   Grooming Assistance 5  Supervision/Setup   UB Bathing Assistance 4  Minimal Assistance   LB Bathing Assistance 2  Maximal Stephan Josee 4  [address] 2  Maximal [address]  2  Maximal Assistance   Bed Mobility   Supine to Sit 3  Moderate assistance   Additional items Assist x 1; Increased time required;LE management;Verbal cues; Bedrails;HOB elevated   Sit to Supine 3  Moderate assistance   Additional items Assist x 1; Increased time required;LE management;Verbal cues;HOB elevated; Bedrails   Additional Comments Pt laying supine in stretcher upon OT arrival  Pt required VC and TC to maintain THR during bed mobility  Pt laying in stretcher w/ all needs in reach s/p OT Session  Transfers   Sit to Stand 4  Minimal assistance   Additional items Assist x 1; Increased time required;Verbal cues;Armrests   Stand to Sit 4  Minimal assistance   Additional items Assist x 1; Increased time required;Verbal cues;Armrests   Additional Comments Transfers w/ RW  VC and TC required for safety, hand placement, and to maintain THR  Functional Mobility   Functional Mobility 4  Minimal assistance   Additional Comments Pt demonstrated short distance household mobility in hallway w/ RW w/ Min A,  Additional items Rolling walker   Balance   Static Sitting Fair +   Dynamic Sitting Fair   Static Standing Fair -   Dynamic Standing Poor +   Ambulatory Poor +   Activity Tolerance   Activity Tolerance Patient limited by fatigue   Medical Staff Made Aware PT Diya Draper; YASMIN for safe dispo planning   Nurse Made Aware FRANCIA Sotelo cleared Pt for OT eval   RUE Assessment   RUE Assessment WFL  (decreased gross strength )   LUE Assessment   LUE Assessment WFL  (decreased gross strength)   Hand Function   Gross Motor Coordination Functional   Fine Motor Coordination Functional   Sensation   Light Touch No apparent deficits   Cognition   Overall Cognitive Status Impaired   Arousal/Participation Alert; Cooperative;Lethargic   Attention Attends with cues to redirect   Orientation Level Oriented X4   Memory Decreased recall of precautions   Following Commands Follows one step commands with increased time or repetition   Comments Pt presents w/ flat affect and is pleasant & cooperative to participate in therapy this day  Pt able to accurately recall 3/3 THR, however required VC and TC to maintain t/o functional tasks  Pt required increased time for processing, safety, and sequencing  Assessment   Limitation Decreased ADL status; Decreased UE strength;Decreased Safe judgement during ADL;Decreased endurance;Decreased cognition;Decreased high-level ADLs   Prognosis Fair   Assessment Pt is a 62 yo female seen for OT eval s/p adm to [facility] w/ s/p fall at home out of bed resulting in R hip dislocation s/p reduction in ED by ortho  Pt s/p ELIZABETH in 07/6908 and complicated by fall early 1/2020 resulting in periprosthetic fx  Comorbidities include a h/o anxiety, schizoaffective disorder depressive type, problem related to living arrangement, DDD, spondylothistehsis L4-L5, T12 compression fx, ABIMAEL, HTN, NMS, chronic low back pain  Pt with active OT orders and up with assistance  orders  Pt reports recent rehab stay and was recently D/C home and home for only 2 days PTA  Pt is retired and resides w/ her 81yo mother in [facility] w/ 2STE  Pt was I w/  ADLS and mother and neighbors A w/ IADLS, does not drive, & required use of DME PTA, including quad cane for household mobility  Pt reports she is unable to utilize RW in her home 2' "unclear walkway" from possible unsafe living environment/hoarding situation  Pt is currently demonstrating the following occupational deficits: Min A UB bathing/dressing, Max A LB bathing/dressing and toileting, Mod A bed mobiltiy, Min A functional transfers and short distance householdm mobility w/ RW  These deficits that are impacting pt's baseline areas of occupation are a result of the following impairments: endurance, activity tolerance, functional mobility, forward functional reach, balance, functional standing tolerance, unsupportive home environment, decreased I w/ ADLS/IADLS, strength, cognitive impairments, decreased safety awareness and decreased insight into deficits   The following Occupational Performance Areas to address include: grooming, bathing/shower, toilet hygiene, dressing, health maintenance, functional mobility and clothing management  Based on the aforementioned OT evaluation, functional performance deficits, and assessments, pt has been identified as a high complexity evaluation  Recommend STR upon D/C  Pt to continue to benefit from acute immediate OT services to address the following goals 3-5x/week to  w/in 7-10 days:    Goals   Patient Goals To return home   LTG Time Frame 7-10   Long Term Goal #1 Refer to goals below   Plan   Treatment Interventions ADL retraining;Functional transfer training;UE strengthening/ROM; Endurance training;Cognitive reorientation;Patient/family training;Equipment evaluation/education; Compensatory technique education; Activityengagement; Energy conservation   Goal Expiration Date 20   OT Frequency 3-5x/wk   Recommendation   OT Discharge Recommendation Short Term Rehab   OT - OK to Discharge Yes  (when medically cleared)   Modified Liverpool Scale   Modified Liverpool Scale 4       GOALS    1) Pt will improve activity tolerance to G for min 30 min txment sessions for increase engagement in functional tasks    2) Pt will complete UB/LB dressing/self care w/ mod I using adaptive device and DME as needed    3) Pt will complete bathing w/ Mod I w/ use of AE and DME as needed    4) Pt will complete toileting w/ mod I w/ G hygiene/thoroughness using DME as needed    5) Pt will improve functional transfers to Mod I on/off all surfaces using DME as needed w/ G balance/safety     6) Pt will improve functional mobility during ADL/IADL/leisure tasks to Mod I using DME as needed w/ G balance/safety     7) Pt will participate in simulated IADL management task to increase independence to Mod I w/ G safety and endurance    8) Pt will be attentive 100% of the time during ongoing cognitive assessment w/ G participation to assist w/ safe d/c planning/recommendations    9) Pt will demonstrate G carryover of pt/caregiver education and training as appropriate w/o cues w/ good tolerance to increase safety during functional tasks    10) Pt will demonstrate 100% carryover of energy conservation techniques t/o functional I/ADL/leisure tasks w/o cues s/p skilled education to increase endurance during functional tasks         Balaji Amaro MS, OTR/L

## 2020-01-27 NOTE — PROGRESS NOTES
Chart review reveals that [place] is [place] [facility]  Patient had 2 falls at home since being discharged from [facility] on 1/25/2020  Patient had a PT/OT eval and was recommended for STR  [facility] would have accepted but patient is refusing  Patient will go home with [facility]  Will follow up EMS concerns regarding home condition with [facility]  IPCM and this OPCM have been in contact  IPCM has offered help with home situation but patient declined

## 2020-01-27 NOTE — ED NOTES
De ([id number])  said pt is working with Case Management and is to be going to rehab        Balwinder Rank  01/27/20 6872

## 2020-01-27 NOTE — SOCIAL WORK
YASMIN and CHERYL Álvarez met with pt at bedside to discuss dcp  Pt states that she is now refusing rehab and wishes to return home  Preeti reviewed safety concerns with returning home vs rehab  Preeti reviewed hip precautions and importance of following them  Preeti informed pt she needs to f/u with Dr Aly Spence office for an OP appointment  Pt reports she can "try and use" her RW in her home and her mom can now help her with the hip wedge  Pt reports there is a "crisis" at home and she needs to go  CM offered to help but pt refuses to inform CM of situation  Pt reports she believes [facility] set up [employer] services for her when d/c  Pt reports she has money for a Taxi and requested phone number  Pt reports that the Taxi service had gotten her walker for her in the past when they got to her home and she will request the same  TC to Lety with Michael Davidson to see if pt was set up with [employer] and will contact CM  CM provided pt with Tax[employer] phone number [phone] to pt  TC received from Helio Will - Nurse reviewer with [employer]  As per Helio Will since pt was back int he community pt would need to be re-optioned

## 2020-01-27 NOTE — ASSESSMENT & PLAN NOTE
· Continue Seroquel 400 mg at bedtime (confirmed this dose with patient)  · Would avoid further escalation given patient's recent history of NMS versus serotonin syndrome  · Continue to hold Geodon

## 2020-01-27 NOTE — ASSESSMENT & PLAN NOTE
Presented after a fall at home, after which she dislocated her right hip  This is placed back in place by Ortho  Of note, she had hip replacement in December of 2019 with revision this month after she sustained a periprosthetic fracture  · Tylenol p r n   · Oxycodone p r n    · Will consult Ortho  · Recommend continued outpatient follow-up

## 2020-01-27 NOTE — ASSESSMENT & PLAN NOTE
· Per EMS, concern for unsure living environment with possible hoarding  Patient lives with 80year old mother who is currently home alone  · CM consult pending, will d/w

## 2020-01-27 NOTE — ASSESSMENT & PLAN NOTE
· Currently stable  Continue Seroquel 400 mg at bedtime --this dose was confirmed with patient by admitting provider    · Would avoid further escalation given patient's recent history of NMS versus serotonin syndrome  · Geodon discontinued last admission  · Continue Paxil and Ativan PRN

## 2020-01-27 NOTE — ASSESSMENT & PLAN NOTE
Sodium 130 on admission  Patient is not acutely symptomatic    She has been hyponatremic in the past   · Trend sodium, repeat BMP

## 2020-01-27 NOTE — ED ATTENDING ATTESTATION
1/27/2020  IBenny DO, saw and evaluated the patient  I have discussed the patient with the resident/non-physician practitioner and agree with the resident's/non-physician practitioner's findings, Plan of Care, and MDM as documented in the resident's/non-physician practitioner's note, except where noted  All available labs and Radiology studies were reviewed  I was present for key portions of any procedure(s) performed by the resident/non-physician practitioner and I was immediately available to provide assistance  At this point I agree with the current assessment done in the Emergency Department  I have conducted an independent evaluation of this patient a history and physical is as follows:    35-year-old female presents status post fall  Patient had right hip replacement done in December and then refractured the right hip in January  Patient got home from rehab 2 days ago and tonight went to center bed but missed the bed and fell onto her butt  Complains of pain in the right hip and buttock  Denies hitting her head, no loss consciousness  No recent fevers  EMS report the patient is not safe to go home secondary to poor living conditions  Patient states she is unable to walk  On exam-no acute distress, heart regular no respiratory distress, right hip surgical site clean dry and intact with some remaining Steri-Strips, mild erythema but not warm to touch, incision not tender to palpation  Does have some tenderness at femoral head/hip    Plan-x-ray hip, check labs, plan for admission for ambulatory dysfunction    ED Course         Critical Care Time  Procedures

## 2020-01-27 NOTE — CONSULTS
Orthopedics Consultation- Orthopedics    Verónica Singh 61 y o  female MRN: [id number]  Unit/Bed#: X ray  Chief Complaint:   right hip pain    HPI:   61 y  o female status post fall with presenting to the ED complaining of right hip pain and inability to bear weight  Patient was at home when she tripped and fell, landing on her bottom  She immediately had pain to the right hip and was unable to bear weight  Pain is well localized to the hip, worse with movement or palpation, better with rest   No numbness or tingling to the leg  Patient has history of right total hip arthroplasty on 12/11 and subsequent revision for periprosthetic fracture on 01/02 by Dr Surendra Echavarria      Review Of Systems:   · Skin: Normal  · Neuro: See HPI  · Musculoskeletal: See HPI  · 14 point review of systems negative except as stated above     Past Medical History:   Past Medical History:   Diagnosis Date    Anxiety     Back pain at L4-L5 level     Schreiber esophagus     Bulimia     Disease of thyroid gland     hypothyroid    GERD (gastroesophageal reflux disease)     Hyperlipidemia     Hypothyroidism     Leukopenia     NMS (neuroleptic malignant syndrome) 01/03/2020    Rheumatoid arthritis involving right hip (HCC)     Sciatica     Seizure ([address] )     due to medication mix up 8/2018 only one historically    Synovial cyst of lumbar spine     Vertigo     Weight gain        Past Surgical History:   Past Surgical History:   Procedure Laterality Date    BONE MARROW BIOPSY      BREAST SURGERY      enlargement     COLONOSCOPY      HIP ARTHROPLASTY Right 1/2/2020    Procedure: REVISION TOTAL HIP ARTHROPLASTY WITH REPAIR OF PARIPROSTHETIC FRACTURE - POSTERIOR APPROACH;  Surgeon: Sammie Adamson MD;  [facility];  Service: Orthopedics    [place] TOTAL HIP ARTHROPLASTY Right 12/11/2019    Procedure: ARTHROPLASTY HIP TOTAL ANTERIOR;  Surgeon: Sammie Adamson MD;  [facility];  Service: Orthopedics    RHINOPLASTY Family History:  Family history reviewed and non-contributory  Family History   Problem Relation Age of Onset    Uterine cancer Mother     Esophageal cancer Father     Colon cancer Maternal Grandmother        Social History:  Social History     Socioeconomic History    Marital status: Single     Spouse name: None    Number of children: None    Years of education: None    Highest education level: None   Occupational History    None   Social Needs    Financial resource strain: None    Food insecurity:     Worry: None     Inability: None    Transportation needs:     Medical: None     Non-medical: None   Tobacco Use    Smoking status: Never Smoker    Smokeless tobacco: Never Used   Substance and Sexual Activity    Alcohol use: Never     Frequency: Never     Binge frequency: Never    Drug use: No    Sexual activity: Not Currently   Lifestyle    Physical activity:     Days per week: None     Minutes per session: None    Stress: To some extent   Relationships    Social connections:     Talks on phone: None     Gets together: None     Attends Congregational service: None     Active member of club or organization: None     Attends meetings of clubs or organizations: None     Relationship status: None    Intimate partner violence:     Fear of current or ex partner: None     Emotionally abused: None     Physically abused: None     Forced sexual activity: None   Other Topics Concern    None   Social History Narrative    Family disruption death of family member parent, per allscripts       Allergies:    Allergies   Allergen Reactions    Risperdal [Risperidone] Shortness Of Breath     Rapid heart beat, SOB    Zyprexa [Olanzapine] Shortness Of Breath     Rapid heartbeat    Latex Rash     Band aids, adhesives wears normal underwear, can eat bananas  USE PAPER TAPE           Labs:  0   Lab Value Date/Time    HCT 31 6 (L) 01/27/2020 0209    HCT 25 2 (L) 01/13/2020 0500    HCT 26 0 (L) 01/10/2020 0444    HGB 10 4 (L) 01/27/2020 0209    HGB 7 8 (L) 01/13/2020 0500    HGB 8 1 (L) 01/10/2020 0444    INR 1 01 01/02/2020 0523    WBC 5 47 01/27/2020 0209    WBC 5 52 01/13/2020 0500    WBC 7 05 01/10/2020 0444    CRP 4 8 (H) 12/04/2019 1004       Meds:    Current Facility-Administered Medications:     sodium chloride 0 9 % bolus 1,000 mL, 1,000 mL, Intravenous, Once, Eliza Humphries MD, Last Rate: 1,000 mL/hr at 01/27/20 0258, 1,000 mL at 01/27/20 0258    Current Outpatient Medications:     acetaminophen (TYLENOL) 325 mg tablet, Take 2 tablets (650 mg total) by mouth every 6 (six) hours as needed for mild pain or fever, Disp: 30 tablet, Rfl: 0    ascorbic acid (VITAMIN C) 500 mg tablet, Take 1 tablet (500 mg total) by mouth 2 (two) times a day, Disp: 60 tablet, Rfl: 0    enoxaparin (LOVENOX) 40 mg/0 4 mL, Inject 0 4 mL (40 mg total) under the skin every 24 hours for 28 days, Disp: , Rfl: 0    ferrous sulfate 324 (65 Fe) mg, Take 1 tablet (324 mg total) by mouth 2 (two) times a day before meals, Disp: 60 tablet, Rfl: 0    folic acid (FOLVITE) 1 mg tablet, Take 1 tablet (1 mg total) by mouth daily, Disp: 30 tablet, Rfl: 0    gabapentin (NEURONTIN) 100 mg capsule, Take 1 capsule (100 mg total) by mouth every 8 (eight) hours, Disp: 90 capsule, Rfl: 0    levothyroxine 25 mcg tablet, Take 25 mcg by mouth daily  , Disp: , Rfl:     LORazepam (ATIVAN) 2 mg tablet, TAKE 1 TABLET (2 MG TOTAL) BY MOUTH EVERY 8 (EIGHT) HOURS AS NEEDED FOR ANXIETY, Disp: 90 tablet, Rfl: 1    methocarbamol (ROBAXIN) 750 mg tablet, Take 1 tablet (750 mg total) by mouth every 6 (six) hours, Disp: 30 tablet, Rfl: 0    Multiple Vitamin (MULTIVITAMIN) capsule, Take 1 capsule by mouth daily, Disp: 30 capsule, Rfl: 0    omeprazole (PriLOSEC) 40 MG capsule, Take 80 mg by mouth 2 (two) times a day  , Disp: , Rfl:     PARoxetine (PAXIL) 10 mg tablet, Take 1 tablet (10 mg total) by mouth daily, Disp: , Rfl: 0    QUEtiapine (SEROquel) 100 mg tablet, Take 1 tablet (100 mg total) by mouth daily at bedtime, Disp: , Rfl: 0    QUEtiapine (SEROquel) 100 mg tablet, Take 1 tablet (100 mg total) by mouth daily at bedtime Per psychology recommendations: increase by 50mg PO daily until reaches 400mg PO HS, Disp: 60 tablet, Rfl: 0    Blood Culture:   Lab Results   Component Value Date    BLOODCX No Growth After 5 Days  01/03/2020       Wound Culture:   No results found for: WOUNDCULT    Ins and Outs:  No intake/output data recorded  Physical Exam:   /62 (BP Location: Right arm)   Pulse 72   Temp 97 5 °F (36 4 °C) (Oral)   Resp 18   Wt 69 4 kg (153 lb)   SpO2 98%   BMI 27 10 kg/m²   Gen: Alert and oriented to person, place, time  HEENT: EOMI, eyes clear, moist mucus membranes, hearing intact  Respiratory: Bilateral chest rise  No audible wheezing found  Cardiovascular: Regular Rate and Rhythm  Abdomen: soft nontender/nondistended  Musculoskeletal: right lower extremity  · Skin intact no erythema, well healing incision  · Mild tenderness to right hip  · Sensation intact L3-S1  · Motor intact with ankle dorsi/plantar flexion, EHL/FHL intact  · 2+ DP pulse    Radiology:   I personally reviewed the films  X-rays of right hip shows a posterior superior dislocation of right hip joint    Procedure note: Hip reduction    After informed consent was obtained and a time out was performed pt underwent conscious sedation performed by the emergency department  Pts hip was reduced with flexion, internal rotation, and axial traction  A palpable cluck was felt  Pt was placed in a hip abduction pillow  Post reduction x rays are ordered and will be reviewed  Pt tolerated the procedure well and was neurovascularly intact pre and post procedure        _*_*_*_*_*_*_*_*_*_*_*_*_*_*_*_*_*_*_*_*_*_*_*_*_*_*_*_*_*_*_*_*_*_*_*_*_*_*_*_*_*    Assessment:  61 y  o female with right hip dislocation, status post successful closed reduction in the emergency department    No plan for any operative intervention      Plan:   · Weight bearing as tolerated right lower extremity in hip abduction pillow  · Posterior hip precautions  · May DC from ED  · F/u with Dr Freddie Helton  · Dispo: Coty Briceno for discharge from ortho perspective      Katja Sabillon MD

## 2020-01-27 NOTE — PLAN OF CARE
Problem: OCCUPATIONAL THERAPY ADULT  Goal: Performs self-care activities at highest level of function for planned discharge setting  See evaluation for individualized goals  Description  Treatment Interventions: ADL retraining, Functional transfer training, UE strengthening/ROM, Endurance training, Cognitive reorientation, Patient/family training, Equipment evaluation/education, Compensatory technique education, Activityengagement, Energy conservation          See flowsheet documentation for full assessment, interventions and recommendations  Note:   Limitation: Decreased ADL status, Decreased UE strength, Decreased Safe judgement during ADL, Decreased endurance, Decreased cognition, Decreased high-level ADLs  Prognosis: Fair  Assessment: Pt is a 60 yo female seen for OT eval s/p adm to [facility] w/ s/p fall at home out of bed resulting in R hip dislocation s/p reduction in ED by ortho  Pt s/p ELIZABETH in 92/1944 and complicated by fall early 1/2020 resulting in periprosthetic fx  Comorbidities include a h/o anxiety, schizoaffective disorder depressive type, problem related to living arrangement, DDD, spondylothistehsis L4-L5, T12 compression fx, ABIMAEL, HTN, NMS, chronic low back pain  Pt with active OT orders and up with assistance  orders  Pt reports recent rehab stay and was recently D/C home and home for only 2 days PTA  Pt is retired and resides w/ her 79yo mother in [facility] w/ 2STE  Pt was I w/  ADLS and mother and neighbors A w/ IADLS, does not drive, & required use of DME PTA, including quad cane for household mobility  Pt reports she is unable to utilize RW in her home 2' "unclear walkway" from possible unsafe living environment/hoarding situation  Pt is currently demonstrating the following occupational deficits: Min A UB bathing/dressing, Max A LB bathing/dressing and toileting, Mod A bed mobiltiy, Min A functional transfers and short distance householdm mobility w/ RW   These deficits that are impacting pt's baseline areas of occupation are a result of the following impairments: endurance, activity tolerance, functional mobility, forward functional reach, balance, functional standing tolerance, unsupportive home environment, decreased I w/ ADLS/IADLS, strength, cognitive impairments, decreased safety awareness and decreased insight into deficits  The following Occupational Performance Areas to address include: grooming, bathing/shower, toilet hygiene, dressing, health maintenance, functional mobility and clothing management  Based on the aforementioned OT evaluation, functional performance deficits, and assessments, pt has been identified as a high complexity evaluation  Recommend STR upon D/C   Pt to continue to benefit from acute immediate OT services to address the following goals 3-5x/week to  w/in 7-10 days:      OT Discharge Recommendation: (S) Short Term Rehab  OT - OK to Discharge: Yes(when medically cleared)

## 2020-01-27 NOTE — UTILIZATION REVIEW
Initial Clinical Review    Admission: Date/Time/Statement: 01/27/2020 @ 0451  Orders Placed This Encounter   Procedures    Place in Observation     Standing Status:   Standing     Number of Occurrences:   1     Order Specific Question:   Admitting Physician     Answer:   Ozzie Holm [id number]     Order Specific Question:   Level of Care     Answer:   Med Surg [16]     ED Arrival Information     Expected Arrival Acuity Means of Arrival Escorted By Service Admission Type    1/27/2020 00:40 1/27/2020 00:50 Urgent Ambulance [address] Urgent    Arrival Complaint    fall        Chief Complaint   Patient presents with   Valaria Reil Fall     pt had right hip replacement in December, re fractured right hip in January, staples removed last Thursday, home from rehab 1 day ago, pt going to sit on her bed and when backward pt missed the bed and fell onto her buttocks, per EMS "pt is not safe to go home" hoarding situation in home and pt is unable to get a walker through hallways, EMS made a pathway and mother moved items back     Assessment/Plan: 61year old female, presented to the ED from home via EMS  Admitted as Observation due to fall  Mechanical fall at home, dislocated her right hip prosthesis, was placed back by Ortho  Per EMS, there is unsafe home situation with possible hoarding  Patient lives at home with her 77-year-old mother  Consult PT/OT  Pain control  Consult Ortho-Of note, she had hip replacement in December of 2019 with revision this month after she sustained a periprosthetic fracture  Consult Case Management  Sodium 130 on admission  Appears dry on exam, was given 1 L intravenous fluid bolus  Will repeat BMP now   01/27/2020  Consult Ortho: right hip dislocation, status post successful closed reduction in the emergency department  No plan for any operative intervention  Plan:   Weight bearing as tolerated right lower extremity in hip abduction pillow    Posterior hip precautions  PT/OT  Pain control  ED Triage Vitals [01/27/20 0104]   Temperature Pulse Respirations Blood Pressure SpO2   97 5 °F (36 4 °C) 88 18 109/57 97 %      Temp Source Heart Rate Source Patient Position - Orthostatic VS BP Location FiO2 (%)   Oral Monitor Lying Right arm --      Pain Score       4        Wt Readings from Last 1 Encounters:   01/27/20 69 4 kg (153 lb)     Additional Vital Signs:   Date/Time  Temp  Pulse  Resp  BP  SpO2  O2 Device  Patient Position - Orthostatic VS   01/27/20 0700    86  16  118/61  97 %  None (Room air)  Lying   01/27/20 0430    84  17  139/75  99 %  None (Room air)  Lying   01/27/20 0415    78  20  118/67  100 %  None (Room air)  Lying   01/27/20 0410    72  17  133/66  100 %  Nasal cannula  Lying   01/27/20 0405    84  16  121/64  100 %  Nasal cannula  Lying   01/27/20 0400    78  18  137/71  100 %  Nasal cannula  Lying   01/27/20 0355    68  17  126/69  100 %  Nasal cannula  Lying   01/27/20 0350    72  16  128/77  100 %  Nasal cannula  Lying   01/27/20 0345    74  16  122/72  100 %  Nasal cannula  Lying   01/27/20 0230    72  18  101/62  98 %  None (Room air)  Lying       Date and Time Eye Opening Best Verbal Response Best Motor Response Marcella Coma Scale Score   01/27/20 0455 4 5 6 15   01/27/20 0115 4 5 6 15     01/27/2020 @ 1058  Right pel/hip: Stable appearance of revised right total hip arthroplasty   Healing right proximal femoral fracture status post ORIF  No acute fractures  Successful reduction of right total hip arthroplasty dislocation      01/27/2020 @ 1042  Right hip/pel:  Dislocation of the right hip arthroplasty  Postoperative changes from the region of the right hip arthroplasty  No acute displaced fractures    Pertinent Labs/Diagnostic Test Results:   Results from last 7 days   Lab Units 01/27/20  0700 01/27/20  0209   WBC Thousand/uL  --  5 47   HEMOGLOBIN g/dL  --  10 4*   HEMATOCRIT %  --  31 6*   PLATELETS Thousands/uL 267 258   NEUTROS ABS Thousands/µL  --  4 54     Results from last 7 days   Lab Units 01/27/20  0209   SODIUM mmol/L 130*   POTASSIUM mmol/L 3 7   CHLORIDE mmol/L 95*   CO2 mmol/L 29   ANION GAP mmol/L 6   BUN mg/dL 6   CREATININE mg/dL 0 46*   EGFR ml/min/1 73sq m 109   CALCIUM mg/dL 9 1     Results from last 7 days   Lab Units 01/27/20  0209   GLUCOSE RANDOM mg/dL 113     Results from last 7 days   Lab Units 01/27/20  0700   CRP mg/L 10 0*     ED Treatment:   Medication Administration from 01/27/2020 0050 to 01/27/2020 0757       Date/Time Order Dose Route Action     01/27/2020 0210 ketorolac (TORADOL) injection 15 mg 15 mg Intravenous Given     01/27/2020 0258 sodium chloride 0 9 % bolus 1,000 mL 1,000 mL Intravenous New Bag     01/27/2020 0401 propofol (DIPRIVAN) 200 MG/20ML bolus injection 69 mg 69 mg Intravenous Given by Other        Past Medical History:   Diagnosis Date    Anxiety     Back pain at L4-L5 level     Schreiber esophagus     Bulimia     Disease of thyroid gland     hypothyroid    GERD (gastroesophageal reflux disease)     Hyperlipidemia     Hypothyroidism     Leukopenia     NMS (neuroleptic malignant syndrome) 01/03/2020    Rheumatoid arthritis involving right hip (HCC)     Sciatica     Seizure ([facility] [address] )     due to medication mix up 8/2018 only one historically    Synovial cyst of lumbar spine     Vertigo     Weight gain      Present on Admission:   Acquired hypothyroidism   Anxiety   Right hip pain   Schizoaffective disorder, depressive type (HCC)   Iron deficiency anemia secondary to inadequate dietary iron intake   Lumbar radiculopathy   GERD (gastroesophageal reflux disease)   Hyponatremia    Admitting Diagnosis: Unspecified multiple injuries, initial encounter [T07  XXXA]  Age/Sex: 61 y o  female  Admission Orders:  Scheduled Medications:  Medications:  ascorbic acid 500 mg Oral BID   enoxaparin 40 mg Subcutaneous Daily   ferrous sulfate 325 mg Oral BID With Meals   folic acid 1 mg Oral Daily gabapentin 100 mg Oral Q8H [facility]   levothyroxine 25 mcg Oral Early Morning   methocarbamol 750 mg Oral Q6H YUMIKO   multivitamin-minerals 1 tablet Oral Daily   pantoprazole 40 mg Oral BID AC   PARoxetine 10 mg Oral Daily   propofol 1 mg/kg Intravenous Once   QUEtiapine 400 mg Oral HS   Continuous IV Infusions:   PRN Meds:  acetaminophen 650 mg Oral Q6H PRN   LORazepam 2 mg Oral Q8H PRN   ondansetron 4 mg Intravenous Q6H PRN   oxyCODONE 5 mg Oral Q6H PRN   Consult PT/OT  Romel SCDs  IP CONSULT TO CASE MANAGEMENT  IP CONSULT TO ORTHOPEDIC SURGERY    01/27/2020 DC order entered - Home with [facility] Utilization Review Department  [email or web address]  ATTENTION: Please call with any questions or concerns to [phone] and carefully listen to the prompts so that you are directed to the right person  All voicemails are confidential   Gem Dumont all requests for admission clinical reviews, approved or denied determinations and any other requests to dedicated fax number below belonging to the campus where the patient is receiving treatment   List of dedicated fax numbers for the Facilities:  [address] DENIALS (Administrative/Medical Necessity) [phone]   [address] (Maternity/NICU/Pediatrics) [phone]   Marissa Quintana [phone]   Calixto Khan [phone]   Ron Light [phone]   Dg Scott [phone]   [address] [phone]   [facility]  [phone]   [address] [facility] [address] [facility]  [address] [address] [address] [facility] [phone]

## 2020-01-27 NOTE — ASSESSMENT & PLAN NOTE
Patient had a mechanical fall at home, dislocated her right hip prosthesis, was placed back by Ortho  Per EMS, there is unsafe home situation with possible hoarding  Patient lives at home with her 80-year-old mother  · Will consult PT and OT  · P r n   Pain regimen  · Consult case management  · Ortho consult

## 2020-01-27 NOTE — ASSESSMENT & PLAN NOTE
· Patient presented after mechanical fall at home  Patient with recent hip replacement December 2019 with revision earlier this month after she sustained periprosthetic fracture  Presented after a fall at home, after which she dislocated her right hip  Noted to have right hip dislocation status post successful close reduction in the emergency department by orthopedic surgery  · Weight-bearing as tolerated  · Hip abduction pillow at all times while in chair or in bed  · Okay for discharge from Ortho perspective, outpatient follow-up with Dr Estela Cho  · P r n   Analgesia  · Await PT/OT recommendations

## 2020-01-30 NOTE — SOCIAL WORK
Entry fro 1/30  VM received from [facility] [facility] [place] from Adult Protective Services  CM returned Mirlande's call  As per Catina Dennison she has been out to see the pt at her home  Catina Dennison reports the home has a lot of stuff and cluttered but not dirty  As per Catina Dennison pt is able to maneuver with a RW

## 2020-02-10 ENCOUNTER — PATIENT OUTREACH (OUTPATIENT)
Dept: INTERNAL MEDICINE CLINIC | Facility: CLINIC | Age: 60
End: 2020-02-10

## 2020-02-10 NOTE — PROGRESS NOTES
Outreach TC to patient for CM assessment  Patient reports significantly decreased pain in R hip  Patient had a R THR on 12/11/19  Patient was discharged on 12/20/19 to [facility]  Patient left AMA on 12/24/19 and sustained a fall at home  Patient was diagnosed on 12/26/19 with a fx of her R femur  Patient was given instructions for conservative care  Patient then went to the ED @ [facility] on 12/27/19  Patient had surgery including IM nailing  Patient was discharged again to [facility] on 1/14/2020  Patient was discharged to home on 1/25/2020  Patient fell again at home on 1/27/2020 and dislocated her R hip  Patient had R  hip placed back into correct position and was discharged to home with [facility]  Patient denies any falls in the last 14 days, is ambulatory with a quad cane  Patient is able to sink bathe independently and to dress herself  Patient continues to work with PT/OT at home  Patient denies any redness, drainage or open areas at the incision site  Patient does report some swelling in her R foot but describes this as a baseline state  Patient reports compliance with home medications  Patient reports that discomfort ranges 0-4 on a 0-10 scale  Patient denies any s/sx of DVT  Patient has a follow up with orthopedics on 2/13/2020  Reviewed home medications, s/sx to report, future appointments and how/when to report symptoms to provider vs 911  Patient verbalizes understanding  Agrees to additional calls

## 2020-02-13 ENCOUNTER — HOSPITAL ENCOUNTER (OUTPATIENT)
Dept: RADIOLOGY | Facility: HOSPITAL | Age: 60
Discharge: HOME | End: 2020-02-13
Attending: ORTHOPAEDIC SURGERY
Payer: COMMERCIAL

## 2020-02-13 ENCOUNTER — OFFICE VISIT (OUTPATIENT)
Dept: OBGYN CLINIC | Facility: HOSPITAL | Age: 60
End: 2020-02-13

## 2020-02-13 VITALS
BODY MASS INDEX: 28.53 KG/M2 | WEIGHT: 161 LBS | SYSTOLIC BLOOD PRESSURE: 133 MMHG | HEART RATE: 103 BPM | HEIGHT: 63 IN | DIASTOLIC BLOOD PRESSURE: 81 MMHG

## 2020-02-13 DIAGNOSIS — Z48.89 AFTERCARE FOLLOWING SURGERY: Primary | ICD-10-CM

## 2020-02-13 DIAGNOSIS — Z48.89 AFTERCARE FOLLOWING SURGERY: ICD-10-CM

## 2020-02-13 PROCEDURE — 73502 X-RAY EXAM HIP UNI 2-3 VIEWS: CPT

## 2020-02-13 PROCEDURE — 1111F DSCHRG MED/CURRENT MED MERGE: CPT | Performed by: ORTHOPAEDIC SURGERY

## 2020-02-13 PROCEDURE — 3075F SYST BP GE 130 - 139MM HG: CPT | Performed by: ORTHOPAEDIC SURGERY

## 2020-02-13 PROCEDURE — 3079F DIAST BP 80-89 MM HG: CPT | Performed by: ORTHOPAEDIC SURGERY

## 2020-02-13 PROCEDURE — 99024 POSTOP FOLLOW-UP VISIT: CPT | Performed by: ORTHOPAEDIC SURGERY

## 2020-02-13 PROCEDURE — 3008F BODY MASS INDEX DOCD: CPT | Performed by: ORTHOPAEDIC SURGERY

## 2020-02-13 RX ORDER — OMEPRAZOLE 40 MG/1
CAPSULE, DELAYED RELEASE ORAL
Qty: 180 CAPSULE | Refills: 1 | OUTPATIENT
Start: 2020-02-13

## 2020-02-13 NOTE — PROGRESS NOTES
61 y o  female status post primary right anterior total hip arthroplasty on 12/11/19  Patient was discharged to rehab and subsequently left AMA  That day, patient had a fall at home and sustained a periprosthetic right femur fracture  She underwent surgical intervention for Open Reduction and Internal Fixation with revision of right total hip arthroplasty under a posterior lateral approach on 1/2/20  Patient stayed in the hospital and was then discharged to a rehab facility where she stayed for 2 weeks  Patient went home within 36 hours, had a fall from her bed with a hip dislocation  She was seen in the emergency room and had a hip reduction  She is doing well at this time  Patient denies any significant pain except for in her buttock area  She states that the PT/OT coming to her home 4 times a week  She has improved her functional capabilities, and has been able to more activities around the house  Patient states that she is getting less dependent on assistive device and is feeling more stable in her home  Patient denies symptoms of an infection  Patient denies any instability  Patient denies any other acute or associated complaints      ROS  Review of Systems - otherwise negative    Past Medical History:   Diagnosis Date    Anxiety     Back pain at L4-L5 level     Schreiber esophagus     Bulimia     Disease of thyroid gland     hypothyroid    GERD (gastroesophageal reflux disease)     Hyperlipidemia     Hypothyroidism     Leukopenia     NMS (neuroleptic malignant syndrome) 01/03/2020    Rheumatoid arthritis involving right hip (HCC)     Sciatica     Seizure ([address] )     due to medication mix up 8/2018 only one historically    Synovial cyst of lumbar spine     Vertigo     Weight gain      Past Surgical History:   Procedure Laterality Date    BONE MARROW BIOPSY      BREAST SURGERY      enlargement     COLONOSCOPY      HIP ARTHROPLASTY Right 1/2/2020    Procedure: REVISION TOTAL HIP ARTHROPLASTY WITH REPAIR OF PARIPROSTHETIC FRACTURE - POSTERIOR APPROACH;  Surgeon: Souleymane Valderrama MD;  Location: [facility];  Service: Orthopedics    NV TOTAL HIP ARTHROPLASTY Right 12/11/2019    Procedure: ARTHROPLASTY HIP TOTAL ANTERIOR;  Surgeon: Souleymane Valderrama MD;  [facility]: [facility];  Service: Orthopedics    RHINOPLASTY       Results Reviewed     None          Physical Exam  Physical Exam   Constitutional: She is oriented to person, place, and time  She appears well-developed and well-nourished  HENT:   Head: Normocephalic and atraumatic  Eyes: Pupils are equal, round, and reactive to light  EOM are normal    Neck: Neck supple  No tracheal deviation present  Cardiovascular: Normal rate and regular rhythm  Pulmonary/Chest: Effort normal and breath sounds normal    Abdominal: There is no guarding  Neurological: She is alert and oriented to person, place, and time  Skin: Skin is warm and dry  Psychiatric: She has a normal mood and affect  Her behavior is normal      Right Hip Exam     Tenderness   The patient is experiencing no tenderness  Range of Motion   Flexion: 80   External rotation: 40   Internal rotation: 15     Muscle Strength   Flexion: 5/5     Other   Erythema: absent  Sensation: normal        Imaging  I personally reviewed these images  :  Joint prosthesis in good position  Stable appearance of orthopedic hardwear  Patient does have notable heterotopic ossification surrounding the greater and lesser trochanter      Assessment/Plan  61 y o  female status post right revision posterior total hip arthroplasty   · Continue with activities as tolerated  · Continue with physical therapy activities  · Posterior hip precautions  · Complete DVT ppx (4 weeks post-op)  · Hold off on dental appointments for 6 weeks post-op  · Take antibiotics prior to any dental appointments  · This is a life-long requirement  · Follow up in 2 weeks for evaluation with x-ray

## 2020-02-20 ENCOUNTER — TELEPHONE (OUTPATIENT)
Dept: OBGYN CLINIC | Facility: HOSPITAL | Age: 60
End: 2020-02-20

## 2020-02-20 NOTE — TELEPHONE ENCOUNTER
Keith Parker, Physical Therapist at [facility] contacted me today reporting he worked with this patient this morning, he was able to do an entire session with her  Pt is reporting "continued" hip pain, "not worsening, not getting better" and patient reported to Keith Parker she is worried it is dislocated "again "    Keith Parker reports having no indication of dislocation at this time, pt was able to complete PT session  Physical Therapist aware I would let surgeons team know

## 2020-02-20 NOTE — TELEPHONE ENCOUNTER
You can bring her in  It would be better for us to reassure her than let this go   She can either come today or tomorrow

## 2020-02-21 ENCOUNTER — OFFICE VISIT (OUTPATIENT)
Dept: OBGYN CLINIC | Facility: HOSPITAL | Age: 60
End: 2020-02-21

## 2020-02-21 ENCOUNTER — HOSPITAL ENCOUNTER (OUTPATIENT)
Dept: RADIOLOGY | Facility: HOSPITAL | Age: 60
Discharge: HOME | End: 2020-02-21
Payer: COMMERCIAL

## 2020-02-21 VITALS
SYSTOLIC BLOOD PRESSURE: 123 MMHG | DIASTOLIC BLOOD PRESSURE: 78 MMHG | HEIGHT: 63 IN | WEIGHT: 167 LBS | BODY MASS INDEX: 29.59 KG/M2 | HEART RATE: 102 BPM

## 2020-02-21 DIAGNOSIS — Z48.89 AFTERCARE FOLLOWING SURGERY: Primary | ICD-10-CM

## 2020-02-21 DIAGNOSIS — Z48.89 AFTERCARE FOLLOWING SURGERY: ICD-10-CM

## 2020-02-21 PROCEDURE — 73502 X-RAY EXAM HIP UNI 2-3 VIEWS: CPT

## 2020-02-21 PROCEDURE — 3078F DIAST BP <80 MM HG: CPT | Performed by: PHYSICIAN ASSISTANT

## 2020-02-21 PROCEDURE — 99024 POSTOP FOLLOW-UP VISIT: CPT | Performed by: PHYSICIAN ASSISTANT

## 2020-02-21 PROCEDURE — 3074F SYST BP LT 130 MM HG: CPT | Performed by: PHYSICIAN ASSISTANT

## 2020-02-21 PROCEDURE — 1111F DSCHRG MED/CURRENT MED MERGE: CPT | Performed by: PHYSICIAN ASSISTANT

## 2020-03-02 ENCOUNTER — TELEPHONE (OUTPATIENT)
Dept: OBGYN CLINIC | Facility: HOSPITAL | Age: 60
End: 2020-03-02

## 2020-03-02 DIAGNOSIS — Z96.641 STATUS POST RIGHT HIP REPLACEMENT: ICD-10-CM

## 2020-03-02 RX ORDER — GABAPENTIN 100 MG/1
100 CAPSULE ORAL EVERY 8 HOURS
Qty: 90 CAPSULE | Refills: 0 | Status: SHIPPED | OUTPATIENT
Start: 2020-03-02 | End: 2020-03-30

## 2020-03-04 ENCOUNTER — TELEPHONE (OUTPATIENT)
Dept: OBGYN CLINIC | Facility: HOSPITAL | Age: 60
End: 2020-03-04

## 2020-03-04 NOTE — TELEPHONE ENCOUNTER
Patient contacted me today requesting to speak to surgeons physician assistant  I did ask if I could assist her with her question and she reports no she would like to speak to the physician assistant  I did advise that I would let the physician assistant know that she is requesting a call but I am unsure of when she will be able to call her back    I again asked if she could elaborate on what she needed a call for an she reports I need a referral 

## 2020-03-12 ENCOUNTER — TELEPHONE (OUTPATIENT)
Dept: OBGYN CLINIC | Facility: HOSPITAL | Age: 60
End: 2020-03-12

## 2020-03-12 ENCOUNTER — OFFICE VISIT (OUTPATIENT)
Dept: OBGYN CLINIC | Facility: HOSPITAL | Age: 60
End: 2020-03-12

## 2020-03-12 ENCOUNTER — HOSPITAL ENCOUNTER (OUTPATIENT)
Dept: RADIOLOGY | Facility: HOSPITAL | Age: 60
Discharge: HOME | End: 2020-03-12
Attending: ORTHOPAEDIC SURGERY
Payer: COMMERCIAL

## 2020-03-12 VITALS
WEIGHT: 169 LBS | DIASTOLIC BLOOD PRESSURE: 66 MMHG | BODY MASS INDEX: 29.95 KG/M2 | HEART RATE: 94 BPM | SYSTOLIC BLOOD PRESSURE: 101 MMHG | HEIGHT: 63 IN

## 2020-03-12 DIAGNOSIS — Z48.89 AFTERCARE FOLLOWING SURGERY: ICD-10-CM

## 2020-03-12 DIAGNOSIS — M16.11 PRIMARY OSTEOARTHRITIS OF RIGHT HIP: ICD-10-CM

## 2020-03-12 DIAGNOSIS — M16.11 PRIMARY OSTEOARTHRITIS OF RIGHT HIP: Primary | ICD-10-CM

## 2020-03-12 PROCEDURE — 3078F DIAST BP <80 MM HG: CPT | Performed by: ORTHOPAEDIC SURGERY

## 2020-03-12 PROCEDURE — 73502 X-RAY EXAM HIP UNI 2-3 VIEWS: CPT

## 2020-03-12 PROCEDURE — 3074F SYST BP LT 130 MM HG: CPT | Performed by: ORTHOPAEDIC SURGERY

## 2020-03-12 PROCEDURE — 1111F DSCHRG MED/CURRENT MED MERGE: CPT | Performed by: ORTHOPAEDIC SURGERY

## 2020-03-12 PROCEDURE — 99024 POSTOP FOLLOW-UP VISIT: CPT | Performed by: ORTHOPAEDIC SURGERY

## 2020-03-12 NOTE — PROGRESS NOTES
Orthopaedic Surgery - Office Note  Marta Brannon (15 y o  female)   : 1960   MRN: [id number]  Encounter Date: 3/12/2020    Chief Complaint   Patient presents with    Right Hip - Follow-up       Assessment / Plan  S/P revision posterior posterior ELIZABETH 2020    · XR AT NEXT VISIT  · Hip precautions may be removed 2020  · New referral for home PT was ordered today  Explained to the patient that this may be denies due to her functional status  If this is the case, she was instructed to call for an outpatient PT referral  Return in about 3 months (around 2020)  History of Present Illness  Marta Brannon is a 61 y o  female who presents for follow up  Patient's history is as follows, right anterior ELIZABETH 19 when she left rehab AMA  That day patient fell and sustained periprosthetic fracture and underwent ORIF with ELIZABETH revision  After rehab discharge within 36 hrs patient fell from bed and dislocated hip  Today she reports that she is doing well and has little to no pain  She recently completed her home PT and is asking for a new referral  She has been compliant with her hip precautions  Review of Systems  Pertinent items are noted in HPI  All other systems were reviewed and are negative  Physical Exam  /66   Pulse 94   Ht 5' 3" (1 6 m)   Wt 76 7 kg (169 lb)   BMI 29 94 kg/m²   Cons: Appears well  No apparent distress  Psych: Alert  Oriented x3  Mood and affect normal   Eyes: PERRLA, EOMI  Resp: Normal effort  No audible wheezing or stridor  CV: Palpable pulse  No discernable arrhythmia  No LE edema  Lymph:  No palpable cervical, axillary, or inguinal lymphadenopathy  Skin: Warm  No palpable masses  No visible lesions  Neuro: Normal muscle tone  Normal and symmetric DTR's  Right Hip Exam  Alignment / Posture:  Normal resting hip posture  Inspection:  No swelling  No erythema  No ecchymosis  Incision healed  Palpation:  No tenderness    ROM: Hip Flexion 90  Hip ER 40  Hip IR 15  Strength:  Hip Flexors 5/5  Stability:  Not tested  Tests:  No pertinent positive or negative tests  Neurovascular:  Sensation intact in DP/SP/Gilman/Sa/T nerve distributions  2+ DP & PT pulses  Brisk capillary refill in all toes  Toes warm and perfused  Gait:  Steady with walker  Studies Reviewed  I have personally reviewed pertinent films in PACS and my interpretation is rigth hip xrays 3/12/2020: Prosthesis and hardware remained well aligned  Procedures  No procedures today  Medical, Surgical, Family, and Social History  The patient's medical history, family history, and social history, were reviewed and updated as appropriate      Past Medical History:   Diagnosis Date    Anxiety     Back pain at L4-L5 level     Schreiber esophagus     Bulimia     Disease of thyroid gland     hypothyroid    GERD (gastroesophageal reflux disease)     Hyperlipidemia     Hypothyroidism     Leukopenia     NMS (neuroleptic malignant syndrome) 01/03/2020    Rheumatoid arthritis involving right hip (HCC)     Sciatica     Seizure ([address] )     due to medication mix up 8/2018 only one historically    Synovial cyst of lumbar spine     Vertigo     Weight gain        Past Surgical History:   Procedure Laterality Date    BONE MARROW BIOPSY      BREAST SURGERY      enlargement     COLONOSCOPY      HIP ARTHROPLASTY Right 1/2/2020    Procedure: REVISION TOTAL HIP ARTHROPLASTY WITH REPAIR OF PARIPROSTHETIC FRACTURE - POSTERIOR APPROACH;  Surgeon: Ana María Bedolla MD;  [facility];  Service: Orthopedics    NM TOTAL HIP ARTHROPLASTY Right 12/11/2019    Procedure: ARTHROPLASTY HIP TOTAL ANTERIOR;  Surgeon: Ana María Bedolla MD;  [facility];  Service: Orthopedics    RHINOPLASTY         Family History   Problem Relation Age of Onset    Uterine cancer Mother     Esophageal cancer Father     Colon cancer Maternal Grandmother        Social History     Occupational History  Not on file   Tobacco Use    Smoking status: Never Smoker    Smokeless tobacco: Never Used   Substance and Sexual Activity    Alcohol use: Never     Frequency: Never     Binge frequency: Never    Drug use: No    Sexual activity: Not Currently       Allergies   Allergen Reactions    Risperdal [Risperidone] Shortness Of Breath     Rapid heart beat, SOB    Zyprexa [Olanzapine] Shortness Of Breath     Rapid heartbeat    Latex Rash     Band aids, adhesives wears normal underwear, can eat bananas  USE PAPER TAPE         Current Outpatient Medications:     acetaminophen (TYLENOL) 325 mg tablet, Take 2 tablets (650 mg total) by mouth every 6 (six) hours as needed for mild pain or fever, Disp: 30 tablet, Rfl: 0    ascorbic acid (VITAMIN C) 500 mg tablet, Take 1 tablet (500 mg total) by mouth 2 (two) times a day, Disp: 60 tablet, Rfl: 0    enoxaparin (LOVENOX) 40 mg/0 4 mL, Inject 0 4 mL (40 mg total) under the skin every 24 hours for 28 days, Disp: , Rfl: 0    ferrous sulfate 324 (65 Fe) mg, Take 1 tablet (324 mg total) by mouth 2 (two) times a day before meals, Disp: 60 tablet, Rfl: 0    folic acid (FOLVITE) 1 mg tablet, Take 1 tablet (1 mg total) by mouth daily, Disp: 30 tablet, Rfl: 0    gabapentin (NEURONTIN) 100 mg capsule, Take 1 capsule (100 mg total) by mouth every 8 (eight) hours, Disp: 90 capsule, Rfl: 0    levothyroxine 25 mcg tablet, Take 25 mcg by mouth daily  , Disp: , Rfl:     LORazepam (ATIVAN) 2 mg tablet, TAKE 1 TABLET (2 MG TOTAL) BY MOUTH EVERY 8 (EIGHT) HOURS AS NEEDED FOR ANXIETY, Disp: 90 tablet, Rfl: 1    methocarbamol (ROBAXIN) 750 mg tablet, Take 1 tablet (750 mg total) by mouth every 6 (six) hours, Disp: 30 tablet, Rfl: 0    Multiple Vitamin (MULTIVITAMIN) capsule, Take 1 capsule by mouth daily, Disp: 30 capsule, Rfl: 0    omeprazole (PriLOSEC) 40 MG capsule, Take 80 mg by mouth 2 (two) times a day  , Disp: , Rfl:     PARoxetine (PAXIL) 10 mg tablet, Take 1 tablet (10 mg total) by mouth daily (Patient taking differently: Take 30 mg by mouth daily ), Disp: , Rfl: 0    QUEtiapine (SEROquel) 400 MG tablet, Take 400 mg by mouth daily at bedtime, Disp: , Rfl:       Allyson Cherry MA    Scribe Attestation    I,:   Allyson Cherry MA am acting as a scribe while in the presence of the attending physician :        I,:   Bessie Kay MD personally performed the services described in this documentation    as scribed in my presence :

## 2020-03-12 NOTE — TELEPHONE ENCOUNTER
Patient sees Dr Sheryl Gresham at [facility] intake department is calling in wanting to know as to why they are being assigned an order for this patient to have PT at home when it looks like she is already under someones care for this  She is asking for more clarification on this  She is asking for a call back relating this               Call back# [phone]

## 2020-03-12 NOTE — TELEPHONE ENCOUNTER
I called and s/w Marlee Peabody - she is aware & the system automatically sends to [facility]    This is resolved    Thanks!

## 2020-03-17 DIAGNOSIS — F25.1 SCHIZOAFFECTIVE DISORDER, DEPRESSIVE TYPE (HCC): Primary | Chronic | ICD-10-CM

## 2020-03-17 RX ORDER — QUETIAPINE FUMARATE 400 MG/1
400 TABLET, FILM COATED ORAL
Qty: 90 TABLET | Refills: 3 | Status: SHIPPED | OUTPATIENT
Start: 2020-03-17 | End: 2021-02-11

## 2020-03-20 ENCOUNTER — PATIENT OUTREACH (OUTPATIENT)
Dept: INTERNAL MEDICINE CLINIC | Facility: CLINIC | Age: 60
End: 2020-03-20

## 2020-03-20 DIAGNOSIS — K21.9 GASTROESOPHAGEAL REFLUX DISEASE WITHOUT ESOPHAGITIS: Primary | ICD-10-CM

## 2020-03-20 RX ORDER — OMEPRAZOLE 40 MG/1
CAPSULE, DELAYED RELEASE ORAL
Qty: 180 CAPSULE | Refills: 1 | Status: SHIPPED | OUTPATIENT
Start: 2020-03-20 | End: 2020-09-24 | Stop reason: HOSPADM

## 2020-03-20 NOTE — PROGRESS NOTES
Outreach TC to patient for  care management assessment  Patient reports that she is feeling well  Patient denies s/sx of R hip pain or dysfunction  Patient reports that she is independent with ADL's  Patient is ambulatory with a cane  Patient saw orthopedics on 3/12/2020 made a f/u appointment for June  Patient had an x-ray completed which showed R femur fx well approximated and healing and R hip prosthetic in good alignment  Patient may discontinue her hip precautions on April 1  Reviewed home medications, future appointments, s/sx to report and how/when to report symptoms to provider vs  911  Patient verbalizes understanding  Educated patient on weight loss strategies  Patient's mobility has been impaired for at least 6 months and patient has gained about 12 lbs  Educated on fresh vegetables, fresh fruit, high fiber, controlled carbohydrate diet with increased activity as tolerated to help shed those pounds  Patient verbalizes understanding  Agrees to additional calls

## 2020-03-24 ENCOUNTER — TELEPHONE (OUTPATIENT)
Dept: INTERNAL MEDICINE CLINIC | Facility: CLINIC | Age: 60
End: 2020-03-24

## 2020-03-24 DIAGNOSIS — F41.1 GAD (GENERALIZED ANXIETY DISORDER): Chronic | ICD-10-CM

## 2020-03-24 RX ORDER — ZIPRASIDONE HYDROCHLORIDE 80 MG/1
80 CAPSULE ORAL DAILY
Qty: 30 CAPSULE | Refills: 5 | Status: SHIPPED | OUTPATIENT
Start: 2020-03-24 | End: 2020-09-18

## 2020-03-24 RX ORDER — PAROXETINE 10 MG/1
30 TABLET, FILM COATED ORAL DAILY
Qty: 90 TABLET | Refills: 5 | Status: SHIPPED | OUTPATIENT
Start: 2020-03-24 | End: 2020-08-25 | Stop reason: ALTCHOICE

## 2020-03-24 NOTE — TELEPHONE ENCOUNTER
Patient said when she was in [facility] in [place] [facility] [place] two of her meds that she needs refills on were changed  She said for the Paxil she takes 1 30mg tab in the morning  For the ziprasidone HCL caps she takes 1 80mg in the morning  I don't see this in her med list  But she said that she was put back on this med while at [facility] [place] [facility]  Pt uses [facility] on [address]

## 2020-03-24 NOTE — TELEPHONE ENCOUNTER
Done, let pt know, and let her know she will need to get these refilled from Psychiatry next time as they are managing these meds

## 2020-03-26 DIAGNOSIS — L23.9 DERMAL HYPERSENSITIVITY REACTION: ICD-10-CM

## 2020-03-26 RX ORDER — TRIAMCINOLONE ACETONIDE 1 MG/G
1 CREAM TOPICAL 2 TIMES DAILY
Qty: 80 G | Refills: 2 | Status: SHIPPED | OUTPATIENT
Start: 2020-03-26 | End: 2020-07-22 | Stop reason: SDUPTHER

## 2020-03-29 DIAGNOSIS — Z96.641 STATUS POST RIGHT HIP REPLACEMENT: ICD-10-CM

## 2020-03-30 ENCOUNTER — TELEMEDICINE (OUTPATIENT)
Dept: INTERNAL MEDICINE CLINIC | Facility: CLINIC | Age: 60
End: 2020-03-30
Payer: COMMERCIAL

## 2020-03-30 ENCOUNTER — TELEPHONE (OUTPATIENT)
Dept: INTERNAL MEDICINE CLINIC | Facility: CLINIC | Age: 60
End: 2020-03-30

## 2020-03-30 DIAGNOSIS — R42 DIZZINESS: Primary | ICD-10-CM

## 2020-03-30 PROCEDURE — 99213 OFFICE O/P EST LOW 20 MIN: CPT | Performed by: INTERNAL MEDICINE

## 2020-03-30 RX ORDER — MECLIZINE HYDROCHLORIDE 25 MG/1
25 TABLET ORAL 3 TIMES DAILY PRN
Qty: 30 TABLET | Refills: 0 | Status: SHIPPED | OUTPATIENT
Start: 2020-03-30 | End: 2021-05-28 | Stop reason: SDUPTHER

## 2020-03-30 RX ORDER — GABAPENTIN 100 MG/1
100 CAPSULE ORAL EVERY 8 HOURS
Qty: 90 CAPSULE | Refills: 0 | Status: SHIPPED | OUTPATIENT
Start: 2020-03-30 | End: 2020-03-30 | Stop reason: ALTCHOICE

## 2020-03-30 NOTE — PROGRESS NOTES
Virtual Brief Visit    Problem List Items Addressed This Visit        Other    Dizziness - Primary     Avoid rapid head movements or position changes, can try medication if needed  Can "google" Epley maneuvers, and try these at home  If not improving, consider physical therapy evaluation for canalith repositioning procedures and vestibular training  Patient can also try over-the-counter Flonase since her symptoms seem to be associated with eustachian tube dysfunction, and Flonase may be able to help with this  Follow-up ENT if not improving  Will also try meclizine as needed         Relevant Medications    meclizine (ANTIVERT) 25 mg tablet          BMI Counseling: There is no height or weight on file to calculate BMI  The BMI is above normal  Nutrition recommendations include encouraging healthy choices of fruits and vegetables and moderation in carbohydrate intake  Exercise recommendations include exercising 3-5 times per week  Reason for visit is dizziness  Encounter provider Wilda Pedersen MD    Provider located at [address] [place] [id number]      Recent Visits  Date Type Provider Dept   03/24/20 Telephone [facility] recent visits within past 7 days and meeting all other requirements     Today's Visits  Date Type Provider Dept   [facility]   03/30/20 Telephone Wilda Pedersen MD [facility] today's visits and meeting all other requirements     Future Appointments  Date Type Provider Dept   03/30/20 Telemedicine Wilda Pedersen MD  [facility]   Showing future appointments within next 150 days and meeting all other requirements        After connecting through telephone, the patient was identified by name and date of birth   Michelle Stokes was informed that this is a telemedicine visit and that the visit is being conducted through telephone  My office door was closed  No one else was in the room  She acknowledged consent and understanding of privacy and security of the video platform  The patient has agreed to participate and understands they can discontinue the visit at any time  Patient is aware this is a billable service  Subjective  Cooper Coreas is a 61 y o  female  Dizziness: pt started with intermittent bouts of this in January 2020  She was seeing physical therapy for this, and since she stopped physical therapy, some of the intermittent symptoms have returned  No slurred speech, no difficulty swallowing, no lightheadedness  No ear pain or sinus congestion, but she did have some intermittent eustachian tube problems I would associated with the dizziness          Past Medical History:   Diagnosis Date    Anxiety     Back pain at L4-L5 level     Schreiber esophagus     Bulimia     Disease of thyroid gland     hypothyroid    GERD (gastroesophageal reflux disease)     Hyperlipidemia     Hypothyroidism     Leukopenia     NMS (neuroleptic malignant syndrome) 01/03/2020    Rheumatoid arthritis involving right hip (HCC)     Sciatica     Seizure ([address] )     due to medication mix up 8/2018 only one historically    Synovial cyst of lumbar spine     Vertigo     Weight gain        Past Surgical History:   Procedure Laterality Date    BONE MARROW BIOPSY      BREAST SURGERY      enlargement     COLONOSCOPY      HIP ARTHROPLASTY Right 1/2/2020    Procedure: REVISION TOTAL HIP ARTHROPLASTY WITH REPAIR OF PARIPROSTHETIC FRACTURE - POSTERIOR APPROACH;  Surgeon: Lázaro Cruz MD;  [facility];  Service: Orthopedics    OK TOTAL HIP ARTHROPLASTY Right 12/11/2019    Procedure: ARTHROPLASTY HIP TOTAL ANTERIOR;  Surgeon: Lázaro Cruz MD;  [facility];  Service: Orthopedics    RHINOPLASTY         Current Outpatient Medications   Medication Sig Dispense Refill    levothyroxine 25 mcg tablet Take 25 mcg by mouth daily   LORazepam (ATIVAN) 2 mg tablet TAKE 1 TABLET (2 MG TOTAL) BY MOUTH EVERY 8 (EIGHT) HOURS AS NEEDED FOR ANXIETY 90 tablet 1    Multiple Vitamin (MULTIVITAMIN) capsule Take 1 capsule by mouth daily 30 capsule 0    omeprazole (PriLOSEC) 40 MG capsule TAKE ONE CAPSULE BY MOUTH TWO TIMES A  capsule 1    PARoxetine (PAXIL) 10 mg tablet Take 3 tablets (30 mg total) by mouth daily 90 tablet 5    QUEtiapine (SEROquel) 400 MG tablet Take 1 tablet (400 mg total) by mouth daily at bedtime 90 tablet 3    ziprasidone (GEODON) 80 mg capsule Take 1 capsule (80 mg total) by mouth daily 30 capsule 5    meclizine (ANTIVERT) 25 mg tablet Take 1 tablet (25 mg total) by mouth 3 (three) times a day as needed for dizziness 30 tablet 0    triamcinolone (KENALOG) 0 1 % cream Apply 1 application topically 2 (two) times a day To affected area 80 g 2     No current facility-administered medications for this visit  Allergies   Allergen Reactions    Risperdal [Risperidone] Shortness Of Breath     Rapid heart beat, SOB    Zyprexa [Olanzapine] Shortness Of Breath     Rapid heartbeat    Latex Rash     Band aids, adhesives wears normal underwear, can eat bananas  USE PAPER TAPE       Review of Systems   Constitutional: Negative for chills, fatigue and fever  HENT: Negative for congestion, nosebleeds, postnasal drip, sore throat and trouble swallowing  Eyes: Negative for pain  Respiratory: Negative for cough, chest tightness, shortness of breath and wheezing  Cardiovascular: Negative for chest pain, palpitations and leg swelling  Gastrointestinal: Negative for abdominal pain, constipation, diarrhea, nausea and vomiting  Endocrine: Negative for polydipsia and polyuria  Genitourinary: Negative for dysuria, flank pain and hematuria  Musculoskeletal: Negative for arthralgias and back pain  Skin: Negative for rash     Neurological: Positive for dizziness  Negative for tremors, light-headedness and headaches  Hematological: Does not bruise/bleed easily  Psychiatric/Behavioral: Negative for confusion and dysphoric mood  The patient is not nervous/anxious  I spent 15 minutes with the patient during this visit

## 2020-03-30 NOTE — PATIENT INSTRUCTIONS
Problem List Items Addressed This Visit        Other    Dizziness - Primary     Avoid rapid head movements or position changes, can try medication if needed  Can "google" Epley maneuvers, and try these at home  If not improving, consider physical therapy evaluation for canalith repositioning procedures and vestibular training  Patient can also try over-the-counter Flonase since her symptoms seem to be associated with eustachian tube dysfunction, and Flonase may be able to help with this  Follow-up ENT if not improving    Will also try meclizine as needed         Relevant Medications    meclizine (ANTIVERT) 25 mg tablet

## 2020-03-30 NOTE — ASSESSMENT & PLAN NOTE
Avoid rapid head movements or position changes, can try medication if needed  Can "google" Epley maneuvers, and try these at home  If not improving, consider physical therapy evaluation for canalith repositioning procedures and vestibular training  Patient can also try over-the-counter Flonase since her symptoms seem to be associated with eustachian tube dysfunction, and Flonase may be able to help with this  Follow-up ENT if not improving    Will also try meclizine as needed

## 2020-04-01 ENCOUNTER — TELEPHONE (OUTPATIENT)
Dept: PSYCHIATRY | Facility: CLINIC | Age: 60
End: 2020-04-01

## 2020-04-01 RX ORDER — PAROXETINE 30 MG/1
TABLET, FILM COATED ORAL
Qty: 90 TABLET | Refills: 1 | OUTPATIENT
Start: 2020-04-01

## 2020-04-16 DIAGNOSIS — F41.9 ANXIETY: ICD-10-CM

## 2020-04-16 RX ORDER — LORAZEPAM 2 MG/1
2 TABLET ORAL EVERY 8 HOURS PRN
Qty: 90 TABLET | Refills: 1 | Status: SHIPPED | OUTPATIENT
Start: 2020-04-16 | End: 2020-06-24 | Stop reason: SDUPTHER

## 2020-04-22 ENCOUNTER — TELEPHONE (OUTPATIENT)
Dept: INTERNAL MEDICINE CLINIC | Facility: CLINIC | Age: 60
End: 2020-04-22

## 2020-04-29 ENCOUNTER — TELEMEDICINE (OUTPATIENT)
Dept: INTERNAL MEDICINE CLINIC | Facility: CLINIC | Age: 60
End: 2020-04-29
Payer: COMMERCIAL

## 2020-04-29 DIAGNOSIS — K21.9 GASTROESOPHAGEAL REFLUX DISEASE, ESOPHAGITIS PRESENCE NOT SPECIFIED: ICD-10-CM

## 2020-04-29 DIAGNOSIS — Z00.00 MEDICARE ANNUAL WELLNESS VISIT, INITIAL: Primary | ICD-10-CM

## 2020-04-29 DIAGNOSIS — Z12.11 SCREENING FOR COLON CANCER: ICD-10-CM

## 2020-04-29 DIAGNOSIS — I10 ESSENTIAL HYPERTENSION: ICD-10-CM

## 2020-04-29 DIAGNOSIS — E03.9 ACQUIRED HYPOTHYROIDISM: ICD-10-CM

## 2020-04-29 DIAGNOSIS — Z12.31 SCREENING MAMMOGRAM, ENCOUNTER FOR: ICD-10-CM

## 2020-04-29 PROCEDURE — 1036F TOBACCO NON-USER: CPT | Performed by: INTERNAL MEDICINE

## 2020-04-29 PROCEDURE — 3074F SYST BP LT 130 MM HG: CPT | Performed by: INTERNAL MEDICINE

## 2020-04-29 PROCEDURE — 3078F DIAST BP <80 MM HG: CPT | Performed by: INTERNAL MEDICINE

## 2020-04-29 PROCEDURE — 99214 OFFICE O/P EST MOD 30 MIN: CPT | Performed by: INTERNAL MEDICINE

## 2020-04-29 PROCEDURE — G0439 PPPS, SUBSEQ VISIT: HCPCS | Performed by: INTERNAL MEDICINE

## 2020-04-29 RX ORDER — LEVOTHYROXINE SODIUM 0.03 MG/1
25 TABLET ORAL DAILY
Qty: 90 TABLET | Refills: 3 | Status: SHIPPED | OUTPATIENT
Start: 2020-04-29 | End: 2021-05-19 | Stop reason: SDUPTHER

## 2020-04-30 ENCOUNTER — PATIENT OUTREACH (OUTPATIENT)
Dept: INTERNAL MEDICINE CLINIC | Facility: CLINIC | Age: 60
End: 2020-04-30

## 2020-05-19 ENCOUNTER — TELEMEDICINE (OUTPATIENT)
Dept: INTERNAL MEDICINE CLINIC | Facility: CLINIC | Age: 60
End: 2020-05-19
Payer: COMMERCIAL

## 2020-05-19 DIAGNOSIS — Z20.828 EXPOSURE TO SARS-ASSOCIATED CORONAVIRUS: Primary | ICD-10-CM

## 2020-05-19 DIAGNOSIS — Z20.828 EXPOSURE TO SARS-ASSOCIATED CORONAVIRUS: ICD-10-CM

## 2020-05-19 DIAGNOSIS — R50.9 FEVER, UNSPECIFIED FEVER CAUSE: ICD-10-CM

## 2020-05-19 PROCEDURE — U0003 INFECTIOUS AGENT DETECTION BY NUCLEIC ACID (DNA OR RNA); SEVERE ACUTE RESPIRATORY SYNDROME CORONAVIRUS 2 (SARS-COV-2) (CORONAVIRUS DISEASE [COVID-19]), AMPLIFIED PROBE TECHNIQUE, MAKING USE OF HIGH THROUGHPUT TECHNOLOGIES AS DESCRIBED BY CMS-2020-01-R: HCPCS

## 2020-05-19 PROCEDURE — 99212 OFFICE O/P EST SF 10 MIN: CPT | Performed by: INTERNAL MEDICINE

## 2020-05-21 LAB — SARS-COV-2 RNA SPEC QL NAA+PROBE: NOT DETECTED

## 2020-05-22 ENCOUNTER — TELEPHONE (OUTPATIENT)
Dept: INTERNAL MEDICINE CLINIC | Facility: CLINIC | Age: 60
End: 2020-05-22

## 2020-06-01 ENCOUNTER — TELEMEDICINE (OUTPATIENT)
Dept: INTERNAL MEDICINE CLINIC | Facility: CLINIC | Age: 60
End: 2020-06-01
Payer: COMMERCIAL

## 2020-06-01 DIAGNOSIS — J01.00 ACUTE NON-RECURRENT MAXILLARY SINUSITIS: Primary | ICD-10-CM

## 2020-06-01 PROCEDURE — 99213 OFFICE O/P EST LOW 20 MIN: CPT | Performed by: INTERNAL MEDICINE

## 2020-06-01 RX ORDER — AMOXICILLIN AND CLAVULANATE POTASSIUM 875; 125 MG/1; MG/1
1 TABLET, FILM COATED ORAL EVERY 12 HOURS SCHEDULED
Qty: 14 TABLET | Refills: 0 | Status: SHIPPED | OUTPATIENT
Start: 2020-06-01 | End: 2020-06-08

## 2020-06-03 ENCOUNTER — TELEPHONE (OUTPATIENT)
Dept: INTERNAL MEDICINE CLINIC | Facility: CLINIC | Age: 60
End: 2020-06-03

## 2020-06-03 DIAGNOSIS — R05.9 COUGH: Primary | ICD-10-CM

## 2020-06-04 ENCOUNTER — APPOINTMENT (OUTPATIENT)
Dept: RADIOLOGY | Age: 60
End: 2020-06-04
Payer: COMMERCIAL

## 2020-06-04 ENCOUNTER — TRANSCRIBE ORDERS (OUTPATIENT)
Dept: ADMINISTRATIVE | Age: 60
End: 2020-06-04

## 2020-06-04 DIAGNOSIS — R05.9 COUGH: ICD-10-CM

## 2020-06-04 PROCEDURE — 71046 X-RAY EXAM CHEST 2 VIEWS: CPT

## 2020-06-09 ENCOUNTER — TELEPHONE (OUTPATIENT)
Dept: INTERNAL MEDICINE CLINIC | Facility: CLINIC | Age: 60
End: 2020-06-09

## 2020-06-10 ENCOUNTER — TELEMEDICINE (OUTPATIENT)
Dept: INTERNAL MEDICINE CLINIC | Facility: CLINIC | Age: 60
End: 2020-06-10
Payer: COMMERCIAL

## 2020-06-10 DIAGNOSIS — J01.00 ACUTE NON-RECURRENT MAXILLARY SINUSITIS: Primary | ICD-10-CM

## 2020-06-10 PROCEDURE — 99214 OFFICE O/P EST MOD 30 MIN: CPT | Performed by: INTERNAL MEDICINE

## 2020-06-10 RX ORDER — AMOXICILLIN AND CLAVULANATE POTASSIUM 875; 125 MG/1; MG/1
1 TABLET, FILM COATED ORAL EVERY 12 HOURS SCHEDULED
Qty: 14 TABLET | Refills: 0 | Status: SHIPPED | OUTPATIENT
Start: 2020-06-10 | End: 2020-06-17

## 2020-06-24 DIAGNOSIS — F41.9 ANXIETY: ICD-10-CM

## 2020-06-24 RX ORDER — LORAZEPAM 2 MG/1
2 TABLET ORAL EVERY 8 HOURS PRN
Qty: 90 TABLET | Refills: 1 | Status: SHIPPED | OUTPATIENT
Start: 2020-06-24 | End: 2020-09-14 | Stop reason: SDUPTHER

## 2020-07-07 ENCOUNTER — HOSPITAL ENCOUNTER (OUTPATIENT)
Dept: RADIOLOGY | Facility: HOSPITAL | Age: 60
Discharge: HOME | End: 2020-07-07
Attending: ORTHOPAEDIC SURGERY
Payer: COMMERCIAL

## 2020-07-07 ENCOUNTER — OFFICE VISIT (OUTPATIENT)
Dept: OBGYN CLINIC | Facility: HOSPITAL | Age: 60
End: 2020-07-07
Payer: COMMERCIAL

## 2020-07-07 VITALS
BODY MASS INDEX: 29.72 KG/M2 | SYSTOLIC BLOOD PRESSURE: 130 MMHG | WEIGHT: 167.8 LBS | HEART RATE: 103 BPM | DIASTOLIC BLOOD PRESSURE: 86 MMHG

## 2020-07-07 DIAGNOSIS — Z96.641 AFTERCARE FOLLOWING RIGHT HIP JOINT REPLACEMENT SURGERY: ICD-10-CM

## 2020-07-07 DIAGNOSIS — Z96.649 STATUS POST REVISION OF TOTAL HIP REPLACEMENT: Primary | ICD-10-CM

## 2020-07-07 DIAGNOSIS — M16.11 PRIMARY OSTEOARTHRITIS OF RIGHT HIP: ICD-10-CM

## 2020-07-07 DIAGNOSIS — Z47.1 AFTERCARE FOLLOWING RIGHT HIP JOINT REPLACEMENT SURGERY: ICD-10-CM

## 2020-07-07 PROCEDURE — 3075F SYST BP GE 130 - 139MM HG: CPT | Performed by: ORTHOPAEDIC SURGERY

## 2020-07-07 PROCEDURE — 3079F DIAST BP 80-89 MM HG: CPT | Performed by: ORTHOPAEDIC SURGERY

## 2020-07-07 PROCEDURE — 99213 OFFICE O/P EST LOW 20 MIN: CPT | Performed by: ORTHOPAEDIC SURGERY

## 2020-07-07 PROCEDURE — 73502 X-RAY EXAM HIP UNI 2-3 VIEWS: CPT

## 2020-07-07 PROCEDURE — 1036F TOBACCO NON-USER: CPT | Performed by: ORTHOPAEDIC SURGERY

## 2020-07-07 RX ORDER — AMOXICILLIN 500 MG/1
500 CAPSULE ORAL
Qty: 4 CAPSULE | Refills: 0
Start: 2020-07-07 | End: 2020-07-08

## 2020-07-07 RX ORDER — PAROXETINE 30 MG/1
30 TABLET, FILM COATED ORAL DAILY
COMMUNITY
Start: 2020-06-20 | End: 2020-11-02 | Stop reason: SDUPTHER

## 2020-07-07 NOTE — PROGRESS NOTES
Assessment/Plan:  1  Status post revision of total hip replacement  XR hip/pelv 2-3 vws right if performed    amoxicillin (AMOXIL) 500 mg capsule   2  Aftercare following right hip joint replacement surgery  amoxicillin (AMOXIL) 500 mg capsule     Scribe Attestation    I,:   Jaxson Mora am acting as a scribe while in the presence of the attending physician :        I,:   Maci Chen MD personally performed the services described in this documentation    as scribed in my presence :          · I am very pleased with today's imaging and clinical presentation   · Continue with activity to tolerance  · Antibiotics required prior to any dental or GI procedure  A prescription was provided today for an upcoming dental procedure  Patient will call the office for future prescriptions  · Encouraged the patient to follow up with her already established provider for low back pain  · Follow-up on an as-needed basis     Subjective: Follow-up evaluation for right hip    Patient ID: Ethel Lucero is a 61 y o  female who presents today for follow-up evaluation for her right hip  She is a former patient of Dr Ronal Renee  She underwent a primary right total hip arthroplasty in December and subsequently fell  Her fall resulted in a periprosthetic fracture and she underwent a revision procedure with fracture fixation in January  At today's visit, she states that she has been doing very well since she was last seen  She has been discharged from physical therapy and states that she has graduated from utilizing an assistive device for ambulation  She is very happy with the results of her procedure  She does report some mild and intermittent sharp pain about the right side of her low back with heavy lifting but denies any significant pain about her hip or groin  She also reports being able to perform activities of daily living without issue  She denies any new injury or trauma    She denies any distal paresthesias of the lower extremity  Review of Systems   Constitutional: Positive for activity change  Negative for chills, fever and unexpected weight change  HENT: Negative for hearing loss, nosebleeds and sore throat  Eyes: Negative for pain, redness and visual disturbance  Respiratory: Negative for cough, shortness of breath and wheezing  Cardiovascular: Negative for chest pain, palpitations and leg swelling  Gastrointestinal: Negative for abdominal pain, nausea and vomiting  Endocrine: Negative for polydipsia and polyuria  Genitourinary: Negative for dysuria and hematuria  Musculoskeletal: Negative for arthralgias, joint swelling and myalgias  See HPI   Skin: Negative for rash and wound  Neurological: Negative for dizziness, numbness and headaches  Psychiatric/Behavioral: Negative for decreased concentration and suicidal ideas  The patient is not nervous/anxious            Past Medical History:   Diagnosis Date    Anxiety     Back pain at L4-L5 level     Schreiber esophagus     Bulimia     Disease of thyroid gland     hypothyroid    GERD (gastroesophageal reflux disease)     Hyperlipidemia     Hypothyroidism     Leukopenia     NMS (neuroleptic malignant syndrome) 01/03/2020    Rheumatoid arthritis involving right hip (HCC)     Sciatica     Seizure ([address] )     due to medication mix up 8/2018 only one historically    Synovial cyst of lumbar spine     Vertigo     Weight gain        Past Surgical History:   Procedure Laterality Date    BONE MARROW BIOPSY      BREAST SURGERY      enlargement     COLONOSCOPY      HIP ARTHROPLASTY Right 1/2/2020    Procedure: REVISION TOTAL HIP ARTHROPLASTY WITH REPAIR OF PARIPROSTHETIC FRACTURE - POSTERIOR APPROACH;  Surgeon: Savannah Henson MD;  [facility];  Service: Orthopedics    [place] TOTAL HIP ARTHROPLASTY Right 12/11/2019    Procedure: ARTHROPLASTY HIP TOTAL ANTERIOR;  Surgeon: Savannah Henson MD;  [facility];  Service: Orthopedics    RHINOPLASTY         Family History   Problem Relation Age of Onset    Uterine cancer Mother     Esophageal cancer Father     Colon cancer Maternal Grandmother        Social History     Occupational History    Not on file   Tobacco Use    Smoking status: Never Smoker    Smokeless tobacco: Never Used   Substance and Sexual Activity    Alcohol use: Never     Frequency: Never     Binge frequency: Never    Drug use: No    Sexual activity: Not Currently         Current Outpatient Medications:     levothyroxine 25 mcg tablet, Take 1 tablet (25 mcg total) by mouth daily, Disp: 90 tablet, Rfl: 3    LORazepam (ATIVAN) 2 mg tablet, Take 1 tablet (2 mg total) by mouth every 8 (eight) hours as needed for anxiety, Disp: 90 tablet, Rfl: 1    meclizine (ANTIVERT) 25 mg tablet, Take 1 tablet (25 mg total) by mouth 3 (three) times a day as needed for dizziness, Disp: 30 tablet, Rfl: 0    Multiple Vitamin (MULTIVITAMIN) capsule, Take 1 capsule by mouth daily, Disp: 30 capsule, Rfl: 0    omeprazole (PriLOSEC) 40 MG capsule, TAKE ONE CAPSULE BY MOUTH TWO TIMES A DAY, Disp: 180 capsule, Rfl: 1    PARoxetine (PAXIL) 10 mg tablet, Take 3 tablets (30 mg total) by mouth daily, Disp: 90 tablet, Rfl: 5    QUEtiapine (SEROquel) 400 MG tablet, Take 1 tablet (400 mg total) by mouth daily at bedtime, Disp: 90 tablet, Rfl: 3    triamcinolone (KENALOG) 0 1 % cream, Apply 1 application topically 2 (two) times a day To affected area, Disp: 80 g, Rfl: 2    ziprasidone (GEODON) 80 mg capsule, Take 1 capsule (80 mg total) by mouth daily, Disp: 30 capsule, Rfl: 5    amoxicillin (AMOXIL) 500 mg capsule, Take 1 capsule (500 mg total) by mouth 60 minutes pre-procedure for 1 day, Disp: 4 capsule, Rfl: 0    PARoxetine (PAXIL) 30 mg tablet, Take 30 mg by mouth daily, Disp: , Rfl:     Allergies   Allergen Reactions    Risperdal [Risperidone] Shortness Of Breath     Rapid heart beat, SOB    Zyprexa [Olanzapine] Shortness Of Breath     Rapid heartbeat    Latex Rash     Band aids, adhesives wears normal underwear, can eat bananas  USE PAPER TAPE       Objective:  Vitals:    07/07/20 0904   BP: 130/86   Pulse: 103       Body mass index is 29 72 kg/m²  Right Hip Exam     Tenderness   Right hip tenderness location: Sacroiliac joint  Muscle Strength   Abduction: 5/5   Adduction: 5/5   Flexion: 5/5     Other   Erythema: absent  Scars: present (Well-healed operative incision)  Sensation: normal  Pulse: present    Comments:  No pain with passive internal and external rotation of the hip  Neurovascularly intact distally  No tenderness to palpation at the greater trochanter or anterior hip            Physical Exam   Constitutional: She is oriented to person, place, and time  She appears well-developed and well-nourished  HENT:   Head: Normocephalic and atraumatic  Eyes: Pupils are equal, round, and reactive to light  Conjunctivae are normal    Neck: Normal range of motion  Neck supple  Cardiovascular: Normal rate and intact distal pulses  Pulmonary/Chest: Effort normal  No respiratory distress  Musculoskeletal:   As noted in HPI   Neurological: She is alert and oriented to person, place, and time  Skin: Skin is warm and dry  Psychiatric: She has a normal mood and affect  Her behavior is normal    Nursing note and vitals reviewed  I have personally reviewed pertinent films in PACS  X-ray of the right hip obtained on 07/07/2020 demonstrates a well-positioned and well-aligned revision total hip arthroplasty that shows no signs of failure or interval change  There is also a right femur plate and screw fixation with no interval change compared to prior plain films  There is increased callus formation at the fracture site compared to prior plain films

## 2020-07-08 ENCOUNTER — TELEPHONE (OUTPATIENT)
Dept: OBGYN CLINIC | Facility: HOSPITAL | Age: 60
End: 2020-07-08

## 2020-07-08 NOTE — TELEPHONE ENCOUNTER
Dr Clayton   #: [phone]         Patient called in stating pharmacy did not received medication   Please advise, thank you

## 2020-07-08 NOTE — TELEPHONE ENCOUNTER
Called pharmacy, s/w Lady Rodney; called in [place] [facility] [address] as written; will be ready for  in 1hr     S/w Jaclyn Lowery, she was made aware Amoxil RX was called in to [address]  Pt denies having further needs

## 2020-07-08 NOTE — TELEPHONE ENCOUNTER
Patient called to check status of antibiotic being sent to pharmacy     amoxicillin (AMOXIL) 500 mg capsule [[id number]]     Order Details   Dose: 500 mg Route: Oral Frequency: 60 min pre-procedure   Dispense Quantity: 4 capsule Refills: 0 Fills remaining: --           Sig: Take 1 capsule (500 mg total) by mouth 60 minutes pre-procedure for 1 day        PHARMACY     Pharmacy:  [facility] [address] Phone:  [phone] Fax:  [phone]    Address:  [address] [place] [address] Olga Lidia Keys [id number] RODGER #:  [id number]

## 2020-07-22 DIAGNOSIS — L23.9 DERMAL HYPERSENSITIVITY REACTION: ICD-10-CM

## 2020-07-22 RX ORDER — TRIAMCINOLONE ACETONIDE 1 MG/G
1 CREAM TOPICAL 2 TIMES DAILY
Qty: 80 G | Refills: 2 | Status: SHIPPED | OUTPATIENT
Start: 2020-07-22 | End: 2021-02-12 | Stop reason: SDUPTHER

## 2020-07-26 DIAGNOSIS — M16.11 PRIMARY OSTEOARTHRITIS OF RIGHT HIP: ICD-10-CM

## 2020-07-26 DIAGNOSIS — Z01.812 PRE-OPERATIVE LABORATORY EXAMINATION: ICD-10-CM

## 2020-07-27 RX ORDER — MULTIVITAMIN WITH FOLIC ACID 400 MCG
TABLET ORAL
Qty: 30 TABLET | Refills: 0 | OUTPATIENT
Start: 2020-07-27

## 2020-07-30 DIAGNOSIS — J01.00 ACUTE NON-RECURRENT MAXILLARY SINUSITIS: Primary | ICD-10-CM

## 2020-07-30 RX ORDER — AMOXICILLIN AND CLAVULANATE POTASSIUM 875; 125 MG/1; MG/1
1 TABLET, FILM COATED ORAL EVERY 12 HOURS SCHEDULED
Qty: 14 TABLET | Refills: 0 | Status: SHIPPED | OUTPATIENT
Start: 2020-07-30 | End: 2020-08-06

## 2020-08-05 ENCOUNTER — TELEMEDICINE (OUTPATIENT)
Dept: INTERNAL MEDICINE CLINIC | Facility: CLINIC | Age: 60
End: 2020-08-05
Payer: COMMERCIAL

## 2020-08-05 DIAGNOSIS — F25.1 SCHIZOAFFECTIVE DISORDER, DEPRESSIVE TYPE (HCC): Chronic | ICD-10-CM

## 2020-08-05 DIAGNOSIS — E03.9 ACQUIRED HYPOTHYROIDISM: ICD-10-CM

## 2020-08-05 DIAGNOSIS — J01.00 ACUTE NON-RECURRENT MAXILLARY SINUSITIS: Primary | ICD-10-CM

## 2020-08-05 DIAGNOSIS — R42 DIZZINESS: ICD-10-CM

## 2020-08-05 PROCEDURE — 99214 OFFICE O/P EST MOD 30 MIN: CPT | Performed by: INTERNAL MEDICINE

## 2020-08-05 PROCEDURE — 1036F TOBACCO NON-USER: CPT | Performed by: INTERNAL MEDICINE

## 2020-08-05 PROCEDURE — 3075F SYST BP GE 130 - 139MM HG: CPT | Performed by: INTERNAL MEDICINE

## 2020-08-05 PROCEDURE — 3079F DIAST BP 80-89 MM HG: CPT | Performed by: INTERNAL MEDICINE

## 2020-08-05 NOTE — ASSESSMENT & PLAN NOTE
I recommend patient try Flonase over-the-counter, and if he can try some Advil cold and sinus, and follow-up ENT

## 2020-08-05 NOTE — ASSESSMENT & PLAN NOTE
Patient has meclizine, she is also advised to avoid rapid head movements and position changes, follow up if not improving

## 2020-08-05 NOTE — PATIENT INSTRUCTIONS
Problem List Items Addressed This Visit        Endocrine    Acquired hypothyroidism     Continue levothyroxine along with monitoring            Respiratory    Acute non-recurrent maxillary sinusitis - Primary     I recommend patient try Flonase over-the-counter, and if he can try some Advil cold and sinus, and follow-up ENT            Other    Schizoaffective disorder, depressive type ([facility] [address] ) (Chronic)     Continue meds and follow-up Psychiatry         Dizziness     Patient has meclizine, she is also advised to avoid rapid head movements and position changes, follow up if not improving

## 2020-08-05 NOTE — PROGRESS NOTES
Virtual Regular Visit      Assessment/Plan:    Problem List Items Addressed This Visit        Endocrine    Acquired hypothyroidism     Continue levothyroxine along with monitoring            Respiratory    Acute non-recurrent maxillary sinusitis - Primary     I recommend patient try Flonase over-the-counter, and if he can try some Advil cold and sinus, and follow-up ENT            Other    Schizoaffective disorder, depressive type ([address] ) (Chronic)     Continue meds and follow-up Psychiatry         Dizziness     Patient has meclizine, she is also advised to avoid rapid head movements and position changes, follow up if not improving                    Reason for visit is   Chief Complaint   Patient presents with    Virtual Regular Visit        Encounter provider Subhash Rod MD    Provider located at [address] [place] [id number]      Recent Visits  No visits were found meeting these conditions  Showing recent visits within past 7 days and meeting all other requirements     Today's Visits  Date Type Provider Dept   08/05/20 Telemedicine Tye Fox today's visits and meeting all other requirements     Future Appointments  No visits were found meeting these conditions  Showing future appointments within next 150 days and meeting all other requirements        The patient was identified by name and date of birth  Urielalma rosa Mendoza was informed that this is a telemedicine visit and that the visit is being conducted through [facility] and patient was informed that this is a secure, HIPAA-compliant platform  She agrees to proceed     My office door was closed  No one else was in the room  She acknowledged consent and understanding of privacy and security of the video platform  The patient has agreed to participate and understands they can discontinue the visit at any time      Patient is aware this is a billable service  Subjective  Werner Moyer is a 61 y o  female   Patient reports she has been having a problem with her nose, she tried to get in with ENT, but was not able to get in with them  She has runny nose, "blocked eustachian tubes," some dizziness, head congestion, no foul smelling mucus    Hypothyroidism:  Patient reports compliance with thyroid med, no significant fatigue, no constipation, no cold intolerance      Depression:  Patient follows with Psychiatry for her meds       Past Medical History:   Diagnosis Date    Anxiety     Back pain at L4-L5 level     Schreiber esophagus     Bulimia     Disease of thyroid gland     hypothyroid    GERD (gastroesophageal reflux disease)     Hyperlipidemia     Hypothyroidism     Leukopenia     NMS (neuroleptic malignant syndrome) 01/03/2020    Rheumatoid arthritis involving right hip (HCC)     Sciatica     Seizure ([address] )     due to medication mix up 8/2018 only one historically    Synovial cyst of lumbar spine     Vertigo     Weight gain        Past Surgical History:   Procedure Laterality Date    BONE MARROW BIOPSY      BREAST SURGERY      enlargement     COLONOSCOPY      HIP ARTHROPLASTY Right 1/2/2020    Procedure: REVISION TOTAL HIP ARTHROPLASTY WITH REPAIR OF PARIPROSTHETIC FRACTURE - POSTERIOR APPROACH;  Surgeon: Jillian Wilcox MD;  [facility];  Service: Orthopedics    [place] TOTAL HIP ARTHROPLASTY Right 12/11/2019    Procedure: ARTHROPLASTY HIP TOTAL ANTERIOR;  Surgeon: Jillian Wilcox MD;  [facility];  Service: Orthopedics    RHINOPLASTY         Current Outpatient Medications   Medication Sig Dispense Refill    amoxicillin-clavulanate (AUGMENTIN) 875-125 mg per tablet Take 1 tablet by mouth every 12 (twelve) hours for 7 days 14 tablet 0    levothyroxine 25 mcg tablet Take 1 tablet (25 mcg total) by mouth daily 90 tablet 3    LORazepam (ATIVAN) 2 mg tablet Take 1 tablet (2 mg total) by mouth every 8 (eight) hours as needed for anxiety 90 tablet 1    meclizine (ANTIVERT) 25 mg tablet Take 1 tablet (25 mg total) by mouth 3 (three) times a day as needed for dizziness 30 tablet 0    Multiple Vitamin (MULTIVITAMIN) capsule Take 1 capsule by mouth daily 30 capsule 0    omeprazole (PriLOSEC) 40 MG capsule TAKE ONE CAPSULE BY MOUTH TWO TIMES A  capsule 1    PARoxetine (PAXIL) 10 mg tablet Take 3 tablets (30 mg total) by mouth daily 90 tablet 5    PARoxetine (PAXIL) 30 mg tablet Take 30 mg by mouth daily      QUEtiapine (SEROquel) 400 MG tablet Take 1 tablet (400 mg total) by mouth daily at bedtime 90 tablet 3    triamcinolone (KENALOG) 0 1 % cream Apply 1 application topically 2 (two) times a day To affected area 80 g 2    ziprasidone (GEODON) 80 mg capsule Take 1 capsule (80 mg total) by mouth daily 30 capsule 5     No current facility-administered medications for this visit  Allergies   Allergen Reactions    Risperdal [Risperidone] Shortness Of Breath     Rapid heart beat, SOB    Zyprexa [Olanzapine] Shortness Of Breath     Rapid heartbeat    Latex Rash     Band aids, adhesives wears normal underwear, can eat bananas  USE PAPER TAPE       Review of Systems   Constitutional: Negative for chills, fatigue and fever  HENT: Positive for congestion, postnasal drip and rhinorrhea  Negative for nosebleeds, sore throat and trouble swallowing  Eyes: Negative for pain  Respiratory: Negative for cough, chest tightness, shortness of breath and wheezing  Cardiovascular: Negative for chest pain, palpitations and leg swelling  Gastrointestinal: Negative for abdominal pain, constipation, diarrhea, nausea and vomiting  Endocrine: Negative for polydipsia and polyuria  Genitourinary: Negative for dysuria, flank pain and hematuria  Skin: Negative for rash  Neurological: Positive for dizziness  Negative for tremors and headaches  Hematological: Does not bruise/bleed easily     Psychiatric/Behavioral: Negative for confusion and dysphoric mood  The patient is not nervous/anxious  Video Exam    There were no vitals filed for this visit  Physical Exam     It was my intent to perform this visit via video technology but the patient was not able to do a video connection so the visit was completed via audio telephone only  I spent 25 minutes with patient today in which greater than 50% of the time was spent in counseling/coordination of care regarding acute and chronic conditions      VIRTUAL VISIT DISCLAIMER    Dianna Plascencia acknowledges that she has consented to an online visit or consultation  She understands that the online visit is based solely on information provided by her, and that, in the absence of a face-to-face physical evaluation by the physician, the diagnosis she receives is both limited and provisional in terms of accuracy and completeness  This is not intended to replace a full medical face-to-face evaluation by the physician  Mando Dennis understands and accepts these terms

## 2020-08-31 ENCOUNTER — OFFICE VISIT (OUTPATIENT)
Dept: PODIATRY | Facility: CLINIC | Age: 60
End: 2020-08-31
Payer: COMMERCIAL

## 2020-08-31 VITALS
DIASTOLIC BLOOD PRESSURE: 71 MMHG | SYSTOLIC BLOOD PRESSURE: 112 MMHG | WEIGHT: 163 LBS | BODY MASS INDEX: 28.88 KG/M2 | HEIGHT: 63 IN | HEART RATE: 94 BPM

## 2020-08-31 DIAGNOSIS — B35.1 ONYCHOMYCOSIS: Primary | ICD-10-CM

## 2020-08-31 DIAGNOSIS — M20.41 HAMMER TOES OF BOTH FEET: ICD-10-CM

## 2020-08-31 DIAGNOSIS — M20.42 HAMMER TOES OF BOTH FEET: ICD-10-CM

## 2020-08-31 PROCEDURE — 3078F DIAST BP <80 MM HG: CPT | Performed by: PODIATRIST

## 2020-08-31 PROCEDURE — 1036F TOBACCO NON-USER: CPT | Performed by: PODIATRIST

## 2020-08-31 PROCEDURE — 99202 OFFICE O/P NEW SF 15 MIN: CPT | Performed by: PODIATRIST

## 2020-08-31 PROCEDURE — 3074F SYST BP LT 130 MM HG: CPT | Performed by: PODIATRIST

## 2020-08-31 PROCEDURE — 3008F BODY MASS INDEX DOCD: CPT | Performed by: PODIATRIST

## 2020-08-31 PROCEDURE — 3008F BODY MASS INDEX DOCD: CPT | Performed by: INTERNAL MEDICINE

## 2020-08-31 NOTE — PROGRESS NOTES
Assessment/Plan:    Explained to patient that she is dealing with multiple mycotic toenails that are extremely long  Treatment consisted of nail debridement  Reappoint p r n  No problem-specific Assessment & Plan notes found for this encounter  Diagnoses and all orders for this visit:    Onychomycosis    Hammer toes of both feet          Subjective:      Patient ID: James Turcios is a 61 y o  female  HPI     Patient, a 61-year-old female presents with extremely thick and long toenails that she is unable to cut  All lesser toes are hammertoe deformities  Patient had left 5th toe amputated years back due to hammertoe deformity  The following portions of the patient's history were reviewed and updated as appropriate: allergies, current medications, past family history, past medical history, past social history, past surgical history and problem list     Review of Systems   Cardiovascular: Negative  Gastrointestinal: Negative  Musculoskeletal: Positive for gait problem  Psychiatric/Behavioral:        Psychiatric disorder             Objective:      /71   Pulse 94   Ht 5' 3" (1 6 m)   Wt 73 9 kg (163 lb)   BMI 28 87 kg/m²          Physical Exam  Cardiovascular:      Pulses: Normal pulses  Comments: Temperature and turgor within normal limits bilateral  Musculoskeletal:         General: Deformity present  Comments: Hammertoe deformities 2 through 5 right foot; 2 through 4 left foot  Left 5th toe was amputated years back   Skin:     Comments: All toenails are thickened yellow and extremely long

## 2020-09-14 DIAGNOSIS — F41.9 ANXIETY: ICD-10-CM

## 2020-09-14 RX ORDER — LORAZEPAM 2 MG/1
2 TABLET ORAL EVERY 8 HOURS PRN
Qty: 90 TABLET | Refills: 1 | Status: SHIPPED | OUTPATIENT
Start: 2020-09-14 | End: 2020-12-14 | Stop reason: SDUPTHER

## 2020-09-15 DIAGNOSIS — K21.9 GASTROESOPHAGEAL REFLUX DISEASE WITHOUT ESOPHAGITIS: ICD-10-CM

## 2020-09-15 RX ORDER — OMEPRAZOLE 40 MG/1
CAPSULE, DELAYED RELEASE ORAL
Qty: 180 CAPSULE | Refills: 1 | OUTPATIENT
Start: 2020-09-15

## 2020-09-18 DIAGNOSIS — F41.1 GAD (GENERALIZED ANXIETY DISORDER): Chronic | ICD-10-CM

## 2020-09-18 RX ORDER — ZIPRASIDONE HYDROCHLORIDE 80 MG/1
CAPSULE ORAL
Qty: 90 CAPSULE | Refills: 1 | Status: SHIPPED | OUTPATIENT
Start: 2020-09-18 | End: 2021-03-03

## 2020-09-21 ENCOUNTER — HOSPITAL ENCOUNTER (INPATIENT)
Facility: HOSPITAL | Age: 60
LOS: 3 days | Discharge: HOME | DRG: 812 | End: 2020-09-24
Attending: EMERGENCY MEDICINE | Admitting: INTERNAL MEDICINE
Payer: COMMERCIAL

## 2020-09-21 ENCOUNTER — TELEPHONE (OUTPATIENT)
Dept: INTERNAL MEDICINE CLINIC | Facility: CLINIC | Age: 60
End: 2020-09-21

## 2020-09-21 DIAGNOSIS — R42 DIZZINESS: Primary | ICD-10-CM

## 2020-09-21 DIAGNOSIS — K21.00 GASTROESOPHAGEAL REFLUX DISEASE WITH ESOPHAGITIS: ICD-10-CM

## 2020-09-21 DIAGNOSIS — D64.9 SYMPTOMATIC ANEMIA: ICD-10-CM

## 2020-09-21 DIAGNOSIS — D50.9 IRON DEFICIENCY ANEMIA: ICD-10-CM

## 2020-09-21 DIAGNOSIS — R53.83 FATIGUE: ICD-10-CM

## 2020-09-21 PROBLEM — F25.1 SCHIZOAFFECTIVE DISORDER, DEPRESSIVE TYPE (HCC): Status: ACTIVE | Noted: 2018-08-16

## 2020-09-21 PROBLEM — T07.XXXA MULTIPLE EXCORIATIONS: Status: ACTIVE | Noted: 2020-09-21

## 2020-09-21 LAB
ABO GROUP BLD: NORMAL
ALBUMIN SERPL BCP-MCNC: 3 G/DL (ref 3.5–5)
ALP SERPL-CCNC: 98 U/L (ref 46–116)
ALT SERPL W P-5'-P-CCNC: 21 U/L (ref 12–78)
ANION GAP SERPL CALCULATED.3IONS-SCNC: 7 MMOL/L (ref 4–13)
AST SERPL W P-5'-P-CCNC: 11 U/L (ref 5–45)
ATRIAL RATE: 90 BPM
BASOPHILS # BLD AUTO: 0.03 THOUSANDS/ΜL (ref 0–0.1)
BASOPHILS NFR BLD AUTO: 1 % (ref 0–1)
BILIRUB SERPL-MCNC: 0.17 MG/DL (ref 0.2–1)
BLD GP AB SCN SERPL QL: NEGATIVE
BUN SERPL-MCNC: 7 MG/DL (ref 5–25)
CALCIUM ALBUM COR SERPL-MCNC: 9 MG/DL (ref 8.3–10.1)
CALCIUM SERPL-MCNC: 8.2 MG/DL (ref 8.3–10.1)
CHLORIDE SERPL-SCNC: 99 MMOL/L (ref 100–108)
CO2 SERPL-SCNC: 27 MMOL/L (ref 21–32)
CREAT SERPL-MCNC: 0.66 MG/DL (ref 0.6–1.3)
EOSINOPHIL # BLD AUTO: 0.08 THOUSAND/ΜL (ref 0–0.61)
EOSINOPHIL NFR BLD AUTO: 2 % (ref 0–6)
ERYTHROCYTE [DISTWIDTH] IN BLOOD BY AUTOMATED COUNT: 22.7 % (ref 11.6–15.1)
FERRITIN SERPL-MCNC: 6 NG/ML (ref 8–388)
GFR SERPL CREATININE-BSD FRML MDRD: 96 ML/MIN/1.73SQ M
GLUCOSE SERPL-MCNC: 89 MG/DL (ref 65–140)
GLUCOSE SERPL-MCNC: 96 MG/DL (ref 65–140)
HCT VFR BLD AUTO: 22.9 % (ref 34.8–46.1)
HGB BLD-MCNC: 5.6 G/DL (ref 11.5–15.4)
IMM GRANULOCYTES # BLD AUTO: 0.02 THOUSAND/UL (ref 0–0.2)
IMM GRANULOCYTES NFR BLD AUTO: 0 % (ref 0–2)
IRON SATN MFR SERPL: 3 %
IRON SERPL-MCNC: 14 UG/DL (ref 50–170)
LYMPHOCYTES # BLD AUTO: 0.91 THOUSANDS/ΜL (ref 0.6–4.47)
LYMPHOCYTES NFR BLD AUTO: 18 % (ref 14–44)
MCH RBC QN AUTO: 14.7 PG (ref 26.8–34.3)
MCHC RBC AUTO-ENTMCNC: 24.5 G/DL (ref 31.4–37.4)
MCV RBC AUTO: 60 FL (ref 82–98)
MONOCYTES # BLD AUTO: 0.5 THOUSAND/ΜL (ref 0.17–1.22)
MONOCYTES NFR BLD AUTO: 10 % (ref 4–12)
NEUTROPHILS # BLD AUTO: 3.55 THOUSANDS/ΜL (ref 1.85–7.62)
NEUTS SEG NFR BLD AUTO: 69 % (ref 43–75)
NRBC BLD AUTO-RTO: 1 /100 WBCS
P AXIS: 64 DEGREES
PLATELET # BLD AUTO: 366 THOUSANDS/UL (ref 149–390)
PMV BLD AUTO: 9.1 FL (ref 8.9–12.7)
POTASSIUM SERPL-SCNC: 3.2 MMOL/L (ref 3.5–5.3)
PR INTERVAL: 130 MS
PROT SERPL-MCNC: 6.7 G/DL (ref 6.4–8.2)
QRS AXIS: 35 DEGREES
QRSD INTERVAL: 82 MS
QT INTERVAL: 350 MS
QTC INTERVAL: 428 MS
RBC # BLD AUTO: 3.82 MILLION/UL (ref 3.81–5.12)
RH BLD: POSITIVE
SODIUM SERPL-SCNC: 133 MMOL/L (ref 136–145)
SPECIMEN EXPIRATION DATE: NORMAL
T WAVE AXIS: 79 DEGREES
TIBC SERPL-MCNC: 531 UG/DL (ref 250–450)
TROPONIN I SERPL-MCNC: <0.02 NG/ML
VENTRICULAR RATE: 90 BPM
WBC # BLD AUTO: 5.09 THOUSAND/UL (ref 4.31–10.16)

## 2020-09-21 PROCEDURE — P9016 RBC LEUKOCYTES REDUCED: HCPCS

## 2020-09-21 PROCEDURE — 80053 COMPREHEN METABOLIC PANEL: CPT | Performed by: EMERGENCY MEDICINE

## 2020-09-21 PROCEDURE — 99285 EMERGENCY DEPT VISIT HI MDM: CPT

## 2020-09-21 PROCEDURE — 85025 COMPLETE CBC W/AUTO DIFF WBC: CPT | Performed by: EMERGENCY MEDICINE

## 2020-09-21 PROCEDURE — 86920 COMPATIBILITY TEST SPIN: CPT

## 2020-09-21 PROCEDURE — 36415 COLL VENOUS BLD VENIPUNCTURE: CPT | Performed by: EMERGENCY MEDICINE

## 2020-09-21 PROCEDURE — 82728 ASSAY OF FERRITIN: CPT | Performed by: PSYCHIATRY & NEUROLOGY

## 2020-09-21 PROCEDURE — 86900 BLOOD TYPING SEROLOGIC ABO: CPT | Performed by: EMERGENCY MEDICINE

## 2020-09-21 PROCEDURE — 93010 ELECTROCARDIOGRAM REPORT: CPT | Performed by: INTERNAL MEDICINE

## 2020-09-21 PROCEDURE — 30233N1 TRANSFUSION OF NONAUTOLOGOUS RED BLOOD CELLS INTO PERIPHERAL VEIN, PERCUTANEOUS APPROACH: ICD-10-PCS | Performed by: EMERGENCY MEDICINE

## 2020-09-21 PROCEDURE — 84484 ASSAY OF TROPONIN QUANT: CPT | Performed by: EMERGENCY MEDICINE

## 2020-09-21 PROCEDURE — 99223 1ST HOSP IP/OBS HIGH 75: CPT | Performed by: INTERNAL MEDICINE

## 2020-09-21 PROCEDURE — 83540 ASSAY OF IRON: CPT | Performed by: PSYCHIATRY & NEUROLOGY

## 2020-09-21 PROCEDURE — 93005 ELECTROCARDIOGRAM TRACING: CPT

## 2020-09-21 PROCEDURE — 86850 RBC ANTIBODY SCREEN: CPT | Performed by: EMERGENCY MEDICINE

## 2020-09-21 PROCEDURE — 99285 EMERGENCY DEPT VISIT HI MDM: CPT | Performed by: EMERGENCY MEDICINE

## 2020-09-21 PROCEDURE — 83550 IRON BINDING TEST: CPT | Performed by: PSYCHIATRY & NEUROLOGY

## 2020-09-21 PROCEDURE — 86901 BLOOD TYPING SEROLOGIC RH(D): CPT | Performed by: EMERGENCY MEDICINE

## 2020-09-21 PROCEDURE — 82948 REAGENT STRIP/BLOOD GLUCOSE: CPT

## 2020-09-21 PROCEDURE — 36430 TRANSFUSION BLD/BLD COMPNT: CPT

## 2020-09-21 RX ORDER — ACETAMINOPHEN 325 MG/1
650 TABLET ORAL EVERY 6 HOURS PRN
Status: DISCONTINUED | OUTPATIENT
Start: 2020-09-21 | End: 2020-09-24 | Stop reason: HOSPADM

## 2020-09-21 RX ORDER — LEVOTHYROXINE SODIUM 0.03 MG/1
25 TABLET ORAL DAILY
Status: DISCONTINUED | OUTPATIENT
Start: 2020-09-22 | End: 2020-09-24 | Stop reason: HOSPADM

## 2020-09-21 RX ORDER — PANTOPRAZOLE SODIUM 40 MG/1
40 TABLET, DELAYED RELEASE ORAL
Status: DISCONTINUED | OUTPATIENT
Start: 2020-09-21 | End: 2020-09-22

## 2020-09-21 RX ORDER — DIPHENHYDRAMINE HCL 25 MG
25 TABLET ORAL EVERY 6 HOURS PRN
Status: DISCONTINUED | OUTPATIENT
Start: 2020-09-21 | End: 2020-09-24 | Stop reason: HOSPADM

## 2020-09-21 RX ORDER — ZIPRASIDONE HYDROCHLORIDE 20 MG/1
80 CAPSULE ORAL DAILY
Status: DISCONTINUED | OUTPATIENT
Start: 2020-09-22 | End: 2020-09-24 | Stop reason: HOSPADM

## 2020-09-21 RX ORDER — QUETIAPINE FUMARATE 100 MG/1
400 TABLET, FILM COATED ORAL
Status: DISCONTINUED | OUTPATIENT
Start: 2020-09-21 | End: 2020-09-24 | Stop reason: HOSPADM

## 2020-09-21 RX ORDER — AMMONIUM LACTATE 12 G/100G
LOTION TOPICAL 2 TIMES DAILY
Status: DISCONTINUED | OUTPATIENT
Start: 2020-09-21 | End: 2020-09-24 | Stop reason: HOSPADM

## 2020-09-21 RX ORDER — LORAZEPAM 1 MG/1
2 TABLET ORAL EVERY 8 HOURS PRN
Status: DISCONTINUED | OUTPATIENT
Start: 2020-09-21 | End: 2020-09-24 | Stop reason: HOSPADM

## 2020-09-21 RX ORDER — MECLIZINE HYDROCHLORIDE 25 MG/1
25 TABLET ORAL 3 TIMES DAILY PRN
Status: DISCONTINUED | OUTPATIENT
Start: 2020-09-21 | End: 2020-09-24 | Stop reason: HOSPADM

## 2020-09-21 RX ORDER — PAROXETINE HYDROCHLORIDE 20 MG/1
30 TABLET, FILM COATED ORAL DAILY
Status: DISCONTINUED | OUTPATIENT
Start: 2020-09-22 | End: 2020-09-24 | Stop reason: HOSPADM

## 2020-09-21 RX ORDER — POTASSIUM CHLORIDE 20 MEQ/1
40 TABLET, EXTENDED RELEASE ORAL ONCE
Status: COMPLETED | OUTPATIENT
Start: 2020-09-21 | End: 2020-09-21

## 2020-09-21 RX ADMIN — ACETAMINOPHEN 650 MG: 325 TABLET, FILM COATED ORAL at 22:58

## 2020-09-21 RX ADMIN — PANTOPRAZOLE SODIUM 40 MG: 40 TABLET, DELAYED RELEASE ORAL at 21:41

## 2020-09-21 RX ADMIN — POTASSIUM CHLORIDE 40 MEQ: 1500 TABLET, EXTENDED RELEASE ORAL at 17:15

## 2020-09-21 NOTE — ASSESSMENT & PLAN NOTE
Patient with history of iron deficiency anemia  Presents with symptomatic iron deficiency anemia (dizziness/lightheadedness)  Hemoglobin 5 6 on admission  Patient received type and cross, planned for transfuse with 2 units of packed red blood cells  Possibly secondary to bleed versus inadequate dietary iron intake  Plan:  · Continue trending CBC  · Follow-up occult blood stool  · Follow-up iron panel  · If hemoglobin is below 7, transfuse patient

## 2020-09-21 NOTE — ASSESSMENT & PLAN NOTE
Patient hyponatremic at admission  Na 133  Possibly secondary to psychiatric medications (SIADH)      Plan:  · Monitor BMP

## 2020-09-21 NOTE — ASSESSMENT & PLAN NOTE
Continue psychiatric medications    Plan:  · Continue Geodon, Seroquel, Paxil, Ativan q8 p r n  Demetris Zhou

## 2020-09-21 NOTE — ED PROVIDER NOTES
History  Chief Complaint   Patient presents with    Vertigo - Recurrent     Pt states that she has been dizzy the past couple of days with it ending around 6am   Pt states that she is now just extremely fatigued  Pt states that she was diagnosed with vertigo but did not take her meclizine  62 y/o female presents today after an episode of dizziness at home this morning  She states the dizziness had been ongoing for the last few days but was worse this morning  Had been diagnosed with vertigo but did not take her meclizine this morning  Dizziness resolved spontaneously and was followed by an episode of 'fatigue'  Patient reports feeling better now and is thirsty  No chest pain or shortness of breath  No difficulty speaking or focal neurologic deficit noted  History provided by:  Patient  Dizziness   Quality:  Room spinning  Severity:  Unable to specify  Onset quality:  Sudden  Timing:  Intermittent  Progression:  Resolved  Chronicity:  Recurrent  Context comment:  See HPI  Relieved by:  None tried  Worsened by:  Nothing  Ineffective treatments:  None tried  Associated symptoms: no blood in stool, no chest pain, no diarrhea, no headaches, no hearing loss, no nausea, no shortness of breath, no vision changes, no vomiting and no weakness    Risk factors: hx of vertigo and multiple medications    Risk factors: no hx of stroke        Prior to Admission Medications   Prescriptions Last Dose Informant Patient Reported? Taking?    LORazepam (ATIVAN) 2 mg tablet   No Yes   Sig: Take 1 tablet (2 mg total) by mouth every 8 (eight) hours as needed for anxiety   Multiple Vitamin (MULTIVITAMIN) capsule  Self No No   Sig: Take 1 capsule by mouth daily   PARoxetine (PAXIL) 30 mg tablet   Yes Yes   Sig: Take 30 mg by mouth daily   QUEtiapine (SEROquel) 400 MG tablet   No Yes   Sig: Take 1 tablet (400 mg total) by mouth daily at bedtime   levothyroxine 25 mcg tablet   No Yes   Sig: Take 1 tablet (25 mcg total) by mouth daily   meclizine (ANTIVERT) 25 mg tablet   No No   Sig: Take 1 tablet (25 mg total) by mouth 3 (three) times a day as needed for dizziness   omeprazole (PriLOSEC) 40 MG capsule   No Yes   Sig: TAKE ONE CAPSULE BY MOUTH TWO TIMES A DAY   triamcinolone (KENALOG) 0 1 % cream   No Yes   Sig: Apply 1 application topically 2 (two) times a day To affected area   ziprasidone (GEODON) 80 mg capsule   No Yes   Sig: TAKE 1 CAPSULE BY MOUTH EVERY DAY      Facility-Administered Medications: None       Past Medical History:   Diagnosis Date    Anxiety     Back pain at L4-L5 level     Schreiber esophagus     Bulimia     Disease of thyroid gland     hypothyroid    Ear problems     GERD (gastroesophageal reflux disease)     Hyperlipidemia     Hypothyroidism     Leukopenia     Nasal congestion     NMS (neuroleptic malignant syndrome) 01/03/2020    Rheumatoid arthritis involving right hip (HCC)     Sciatica     Seizure (HCC)     due to medication mix up 8/2018 only one historically    Synovial cyst of lumbar spine     Vertigo     Weight gain        Past Surgical History:   Procedure Laterality Date    BONE MARROW BIOPSY      BREAST SURGERY      enlargement     CLOSED REDUCTION DISTAL FEMUR FRACTURE      COLONOSCOPY      COSMETIC SURGERY      HIP ARTHROPLASTY Right 1/2/2020    Procedure: REVISION TOTAL HIP ARTHROPLASTY WITH REPAIR OF PARIPROSTHETIC FRACTURE - POSTERIOR APPROACH;  Surgeon: Shakila Diaz MD;  [facility];  Service: Orthopedics    [place] TOTAL HIP ARTHROPLASTY Right 12/11/2019    Procedure: ARTHROPLASTY HIP TOTAL ANTERIOR;  Surgeon: Shakila Diaz MD;  [facility];  Service: Orthopedics    RHINOPLASTY      SINUS SURGERY         Family History   Problem Relation Age of Onset    Uterine cancer Mother     Esophageal cancer Father     Colon cancer Maternal Grandmother      I have reviewed and agree with the history as documented      E-Cigarette/Vaping    E-Cigarette Use Never User      E-Cigarette/Vaping Substances    Nicotine No     THC No     CBD No     Flavoring No     Other No     Unknown No      Social History     Tobacco Use    Smoking status: Never Smoker    Smokeless tobacco: Never Used   Substance Use Topics    Alcohol use: Never     Frequency: Never     Binge frequency: Never    Drug use: No       Review of Systems   Constitutional: Positive for fatigue  Negative for chills and fever  HENT: Negative for hearing loss, postnasal drip, sore throat and trouble swallowing  Eyes: Negative for visual disturbance  Respiratory: Negative for chest tightness and shortness of breath  Cardiovascular: Negative for chest pain  Gastrointestinal: Negative for abdominal pain, blood in stool, diarrhea, nausea and vomiting  Genitourinary: Negative for dysuria  Musculoskeletal: Negative for back pain  Skin: Negative for rash  Allergic/Immunologic: Negative for immunocompromised state  Neurological: Positive for dizziness  Negative for weakness, light-headedness and headaches  Psychiatric/Behavioral: Negative for confusion  Physical Exam  Physical Exam  Vitals signs and nursing note reviewed  Constitutional:       Appearance: She is well-developed  HENT:      Head: Normocephalic and atraumatic  Mouth/Throat:      Mouth: Mucous membranes are moist       Pharynx: Uvula midline  Tonsils: No tonsillar exudate  Eyes:      Pupils: Pupils are equal, round, and reactive to light  Neck:      Musculoskeletal: Normal range of motion and neck supple  Cardiovascular:      Rate and Rhythm: Normal rate and regular rhythm  Pulmonary:      Effort: Pulmonary effort is normal       Breath sounds: Normal breath sounds  Abdominal:      General: Bowel sounds are normal       Palpations: Abdomen is soft  Tenderness: There is no abdominal tenderness  There is no guarding or rebound  Musculoskeletal:         General: No tenderness or deformity  Skin:     General: Skin is warm and dry  Capillary Refill: Capillary refill takes less than 2 seconds  Neurological:      General: No focal deficit present  Mental Status: She is alert and oriented to person, place, and time  Comments:  Alert and oriented to person, place, and time  GCS 15  CN II-XII intact  No facial asymmetry noted  Strong eye closure & symmetric brow raise  No nystagmus  Speech is clear and not slurred  No word finding issues  Ability to insufflate cheeks, protrude tongue midline & range this laterally  Symmetric/ intact sensation in the upper, mid & lower portions of the face   Finger to nose intact bilaterally  Strength equal upper extremities b/l  Strength equal in lower extremities b/l  Able to hold extremities against gravity x 10 seconds without drift x 4  No pronator drift    5/5 strength on head turn, shoulder shrug, bicep, tricep & deltoid movement against resistance b/l   5/5 strength on b/l hand , pincer grasp, finger abduction, dorsi & volar flexion, hip flexion, dorsi & plantar flexion  Symmetric grossly intact sensation in the UE & LE   Gait deferred   No dysmetria             Psychiatric:         Mood and Affect: Mood normal          Behavior: Behavior normal          Vital Signs  ED Triage Vitals [09/21/20 1030]   Temperature Pulse Respirations Blood Pressure SpO2   98 3 °F (36 8 °C) 92 16 133/71 100 %      Temp Source Heart Rate Source Patient Position - Orthostatic VS BP Location FiO2 (%)   Oral Monitor Lying Right arm --      Pain Score       --           Vitals:    09/21/20 1030   BP: 133/71   Pulse: 92   Patient Position - Orthostatic VS: Lying         Visual Acuity      ED Medications  Medications - No data to display    Diagnostic Studies  Results Reviewed     Procedure Component Value Units Date/Time    CBC and differential [[id number]]  (Abnormal) Collected:  09/21/20 1116    Lab Status:  Final result Specimen:  Blood from Arm, Left Updated: 09/21/20 1154     WBC 5 09 Thousand/uL      RBC 3 82 Million/uL      Hemoglobin 5 6 g/dL      Hematocrit 22 9 %      MCV 60 fL      MCH 14 7 pg      MCHC 24 5 g/dL      RDW 22 7 %      MPV 9 1 fL      Platelets 751 Thousands/uL      nRBC 1 /100 WBCs      Neutrophils Relative 69 %      Immat GRANS % 0 %      Lymphocytes Relative 18 %      Monocytes Relative 10 %      Eosinophils Relative 2 %      Basophils Relative 1 %      Neutrophils Absolute 3 55 Thousands/µL      Immature Grans Absolute 0 02 Thousand/uL      Lymphocytes Absolute 0 91 Thousands/µL      Monocytes Absolute 0 50 Thousand/µL      Eosinophils Absolute 0 08 Thousand/µL      Basophils Absolute 0 03 Thousands/µL     Comprehensive metabolic panel [[id number]]  (Abnormal) Collected:  09/21/20 1116    Lab Status:  Final result Specimen:  Blood from Arm, Left Updated:  09/21/20 1146     Sodium 133 mmol/L      Potassium 3 2 mmol/L      Chloride 99 mmol/L      CO2 27 mmol/L      ANION GAP 7 mmol/L      BUN 7 mg/dL      Creatinine 0 66 mg/dL      Glucose 96 mg/dL      Calcium 8 2 mg/dL      Corrected Calcium 9 0 mg/dL      AST 11 U/L      ALT 21 U/L      Alkaline Phosphatase 98 U/L      Total Protein 6 7 g/dL      Albumin 3 0 g/dL      Total Bilirubin 0 17 mg/dL      eGFR 96 ml/min/1 73sq m     Narrative:       [place] guidelines for Chronic Kidney Disease (CKD):     Stage 1 with normal or high GFR (GFR > 90 mL/min/1 73 square meters)    Stage 2 Mild CKD (GFR = 60-89 mL/min/1 73 square meters)    Stage 3A Moderate CKD (GFR = 45-59 mL/min/1 73 square meters)    Stage 3B Moderate CKD (GFR = 30-44 mL/min/1 73 square meters)    Stage 4 Severe CKD (GFR = 15-29 mL/min/1 73 square meters)    Stage 5 End Stage CKD (GFR <15 mL/min/1 73 square meters)  Note: GFR calculation is accurate only with a steady state creatinine    Troponin I [[id number]]  (Normal) Collected:  09/21/20 1116    Lab Status:  Final result Specimen:  Blood from Arm, Left Updated:  09/21/20 1142     Troponin I <0 02 ng/mL     Fingerstick Glucose (POCT) [[id number]]  (Normal) Collected:  09/21/20 1105    Lab Status:  Final result Updated:  09/21/20 1106     POC Glucose 89 mg/dl                  No orders to display              Procedures  ECG 12 Lead Documentation Only    Date/Time: 9/21/2020 10:58 AM  Performed by: Vince Graves DO  Authorized by: Cortney Hendrickson DO     Indications / Diagnosis:  Dizziness  ECG reviewed by me, the ED Provider: yes    Patient location:  ED  Previous ECG:     Previous ECG:  Compared to current  Comments:      Normal sinus rhythm at 90 beats per minute  Normal axis, normal intervals, no ST T wave abnormalities suggestive of ischemia  QTC is normal   Sinus tachycardia in on previous EKG has since resolved  ED Course                                       MDM  Number of Diagnoses or Management Options  Dizziness: new and requires workup  Fatigue: new and requires workup  Symptomatic anemia: new and requires workup  Diagnosis management comments: ,12:14 PM  MDM: Patient presents to the Emergency Department and was diagnosed with acute symptomatic anemia  This is a new problem that will require additional planned work-up in a hospitalized setting  Clinical laboratory testing, radiology imaging, and medical testing (EKG) were ordered  I independently reviewed the radiologic imaging, EKG, and laboratory studies  This case is considered high risk secondary to the above listed disease process that poses a threat to bodily function that requires further diagnostic testing and management which may include the administration of parenteral controlled substances  Discussed with CHERYL  We had a detailed discussion of the patient's condition and case,  including need for admission  Accepts to his/her service  Bed request/bridging orders placed             Amount and/or Complexity of Data Reviewed  Clinical lab tests: ordered and reviewed  Tests in the medicine section of CPT®: ordered and reviewed  Review and summarize past medical records: yes  Discuss the patient with other providers: yes  Independent visualization of images, tracings, or specimens: yes    Risk of Complications, Morbidity, and/or Mortality  Presenting problems: high  Diagnostic procedures: high  Management options: high    Patient Progress  Patient progress: stable      Disposition  Final diagnoses:   Dizziness   Fatigue   Symptomatic anemia     Time reflects when diagnosis was documented in both MDM as applicable and the Disposition within this note     Time User Action Codes Description Comment    9/21/2020 10:59 AM Carmencita Sanchez [R42] Dizziness     9/21/2020 12:08 PM Carmencita Rodriguez Add [R53 83] Fatigue     9/21/2020 12:08 PM Carmencita Rodriguez Add [D64 9] Symptomatic anemia       ED Disposition     ED Disposition Condition Date/Time Comment    Admit Stable Mon Sep 21, 2020 12:15 PM Case was discussed with CHERYL and the patient's admission status was agreed to be Admission Status: inpatient status to the service of Dr Leah Wood   Follow-up Information    None         Patient's Medications   Discharge Prescriptions    No medications on file     No discharge procedures on file      PDMP Review       Value Time User    PDMP Reviewed  Yes 1/27/2020  6:13 AM Denny Vital DO          ED Provider  Electronically Signed by           Adair Hernandez DO  09/21/20 0721

## 2020-09-21 NOTE — ASSESSMENT & PLAN NOTE
Patient with potassium of 3 2 on admission  Repleted with 40 mEq      Plan:  · Continue to monitor BMP  · Replete if needed

## 2020-09-21 NOTE — H&P
H&P- Ping Liu 1960, 61 y o  female MRN: [id number]    Unit/Bed#: W -01 Encounter: [id number]    Primary Care Provider: Josesito Garcia MD   Date and time admitted to hospital: 9/21/2020 10:25 AM        Symptomatic anemia  Assessment & Plan  Patient with history of iron deficiency anemia  Presents with symptomatic iron deficiency anemia (dizziness/lightheadedness)  Hemoglobin 5 6 on admission  Patient received type and cross, planned for transfuse with 2 units of packed red blood cells  Possibly secondary to bleed versus inadequate dietary iron intake  Plan:  · Continue trending CBC  · Follow-up occult blood stool  · Follow-up iron panel  · If hemoglobin is below 7, transfuse patient  Hypokalemia  Assessment & Plan  Patient with potassium of 3 2 on admission  Repleted with 40 mEq  Plan:  · Continue to monitor BMP  · Replete if needed    Schizoaffective disorder, depressive type ([facility] [address] )  Assessment & Plan  Continue psychiatric medications    Plan:  · Continue Geodon, Seroquel, Paxil, Ativan q8 p r n  Hyponatremia  Assessment & Plan  Patient hyponatremic at admission  Na 133  Possibly secondary to psychiatric medications ([facility])  Plan:  · Monitor BMP    GERD (gastroesophageal reflux disease)  Assessment & Plan  · Protonix b i d     ABIMAEL (generalized anxiety disorder)  Assessment & Plan  Patient has history anxiety  Plan:  · Continue paroxetine 30 mg daily  · Continue Ativan 2 mg q8h    Acquired hypothyroidism  Assessment & Plan  Patient with history of hypothyroidism  Stable  Plan:  · Continue levothyroxine 20 mcg    VTE Prophylaxis: Pharmacologic VTE Prophylaxis contraindicated due to Possible bleed as source of low hemoglobin undetermined   / sequential compression device   Code Status:  Full, code 1  POLST: POLST form is not discussed and not completed at this time      Anticipated Length of Stay:  Patient will be admitted on an Inpatient basis with an anticipated length of stay of  < 2 midnights  Justification for Hospital Stay:  Fatigue, anemia  Chief Complaint:   One-day of increase fatigue  History of Present Illness:    Betzaida Gomes is a 61 y o  female who presents with today after an episode of dizziness at home this morning  Past medical history of iron deficiency anemia, hypothyroidism hyperlipidemia, GERD, anxiety, recurrent vertigo  She states the dizziness had been ongoing for the last few days but was worse this morning  Patient woke up at 6:00 a m  With dizziness, lightheadedness, which improved  Then suddenly felt terribly fatigued and could barely walk, explained that she felt woozy when walking  Denies falls  Never had this before  Worsened with movement, standing, improved with sitting down  Denies recent bleeding source, no blood in stool or urine, no abdominal pain  Denies history of bleeding disorders in herself, or her family  No changes in diet  Diet consist of breakfast eggseggs, cookies for lunch, frozen dinner at night ([place], potatoes, mac and cheese)  Had been diagnosed with vertigo 3 weeks ago at ENT appointment and according to patient, was diagnosed with tinnitus, vertigo, and was prescribed meclizine, which she has been taking intermittently  She did not take her meclizine this morning  Came to ED, reported fatigued and dizziness, /71, afebrile, pulse 92, rr 16  Hg 5 6 (MCV 60, hct 22 9%)  Na 133, K 3 2, Ca 8 2, alb 3 0  EKG done, Sinus tachy, no ischemia or abnormalities  2 units of pRBCs were ordered for the patient  EKG was done, normal sinus rhythm normal     Review of Systems:    Patient reports feeling better now and is thirsty  No chest pain or shortness of breath  No difficulty speaking or focal neurologic deficit noted  Endorses a complaint of rash all over her body that she planned to see a dermatologist for    This rash is itchy, and scabbed over were she says stole over her body  Patient denies nausea, vomiting, chest pain, diarrhea, abdominal pain, recent blood in stool or urine, headache, lightheadedness  She endorses fatigue  Past Medical and Surgical History:     Past Medical History:   Diagnosis Date    Anxiety     Back pain at L4-L5 level     Schreiber esophagus     Bulimia     Disease of thyroid gland     hypothyroid    Ear problems     GERD (gastroesophageal reflux disease)     Hyperlipidemia     Hypothyroidism     Leukopenia     Nasal congestion     NMS (neuroleptic malignant syndrome) 01/03/2020    Rheumatoid arthritis involving right hip (HCC)     Sciatica     Seizure ([address] )     due to medication mix up 8/2018 only one historically    Synovial cyst of lumbar spine     Vertigo     Weight gain        Past Surgical History:   Procedure Laterality Date    BONE MARROW BIOPSY      BREAST SURGERY      enlargement     CLOSED REDUCTION DISTAL FEMUR FRACTURE      COLONOSCOPY      COSMETIC SURGERY      HIP ARTHROPLASTY Right 1/2/2020    Procedure: REVISION TOTAL HIP ARTHROPLASTY WITH REPAIR OF PARIPROSTHETIC FRACTURE - POSTERIOR APPROACH;  Surgeon: Carolina Cagle MD;  [facility];  Service: Orthopedics    CT TOTAL HIP ARTHROPLASTY Right 12/11/2019    Procedure: ARTHROPLASTY HIP TOTAL ANTERIOR;  Surgeon: Carolina Cagle MD;  [facility];  Service: Orthopedics    RHINOPLASTY      SINUS SURGERY         Meds/Allergies:    Prior to Admission medications    Medication Sig Start Date End Date Taking?  Authorizing Provider   levothyroxine 25 mcg tablet Take 1 tablet (25 mcg total) by mouth daily 4/29/20  Yes Blayne Joel MD   LORazepam (ATIVAN) 2 mg tablet Take 1 tablet (2 mg total) by mouth every 8 (eight) hours as needed for anxiety 9/14/20  Yes Blayne Joel MD   omeprazole (PriLOSEC) 40 MG capsule TAKE ONE CAPSULE BY MOUTH TWO TIMES A DAY 3/20/20  Yes Sri Holguin DO   PARoxetine (PAXIL) 30 mg tablet Take 30 mg by mouth daily 6/20/20 Yes Historical Provider, MD   QUEtiapine (SEROquel) 400 MG tablet Take 1 tablet (400 mg total) by mouth daily at bedtime 3/17/20  Yes Ivonne Burger MD   triamcinolone (KENALOG) 0 1 % cream Apply 1 application topically 2 (two) times a day To affected area 7/22/20  Yes Ivonne Burger MD   ziprasidone (GEODON) 80 mg capsule TAKE 1 CAPSULE BY MOUTH EVERY DAY 9/18/20  Yes Ivonne Burger MD   meclizine (ANTIVERT) 25 mg tablet Take 1 tablet (25 mg total) by mouth 3 (three) times a day as needed for dizziness 3/30/20   Ivonne Burger MD   Multiple Vitamin (MULTIVITAMIN) capsule Take 1 capsule by mouth daily 11/14/19   Brien Genao PA-C     I have reviewed home medications with patient personally  Allergies: Allergies   Allergen Reactions    Risperdal [Risperidone] Shortness Of Breath     Rapid heart beat, SOB    Zyprexa [Olanzapine] Shortness Of Breath     Rapid heartbeat    Latex Rash     Band aids, adhesives wears normal underwear, can eat bananas  USE PAPER TAPE       Social History:     Marital Status: Single   Occupation:  Did not assess  Patient Pre-hospital Living Situation:  Lives at home with mother, who is developing dementia  Patient Pre-hospital Level of Mobility:  Ambulating  Patient Pre-hospital Diet Restrictions:  None    Substance Use History:   Social History     Substance and Sexual Activity   Alcohol Use Never    Frequency: Never    Binge frequency: Never     Social History     Tobacco Use   Smoking Status Never Smoker   Smokeless Tobacco Never Used     Social History     Substance and Sexual Activity   Drug Use No       Family History:    Family History   Problem Relation Age of Onset    Uterine cancer Mother     Esophageal cancer Father     Colon cancer Maternal Grandmother        Physical Exam:     Vitals:   Blood Pressure: 141/83 (09/21/20 1711)  Pulse: 95 (09/21/20 1711)  Temperature: 98 6 °F (37 °C) (09/21/20 1711)  Temp Source: Oral (09/21/20 1700)  Respirations: 19 (09/21/20 1700)  Weight - Scale: 76 kg (167 lb 9 6 oz) (09/21/20 1619)  SpO2: 98 % (09/21/20 1711)    Physical Exam  Vitals signs and nursing note reviewed  Constitutional:       General: She is not in acute distress  Appearance: She is well-developed  She is not diaphoretic  HENT:      Head: Normocephalic and atraumatic  Mouth/Throat:      Pharynx: Oropharynx is clear  Eyes:      General: No scleral icterus  Right eye: No discharge  Left eye: No discharge  Conjunctiva/sclera: Conjunctivae normal       Pupils: Pupils are equal, round, and reactive to light  Cardiovascular:      Rate and Rhythm: Normal rate and regular rhythm  Pulmonary:      Effort: Pulmonary effort is normal  No respiratory distress  Breath sounds: Normal breath sounds  Abdominal:      Tenderness: There is no right CVA tenderness  Musculoskeletal:      Right lower leg: No edema  Left lower leg: No edema  Skin:     General: Skin is warm and dry  Coloration: Skin is pale  Skin is not jaundiced  Findings: Rash present  No bruising  Neurological:      General: No focal deficit present  Mental Status: She is alert and oriented to person, place, and time  Psychiatric:         Behavior: Behavior normal          Thought Content: Thought content normal          Judgment: Judgment normal        Additional Data:     Lab Results: I have personally reviewed pertinent reports  Results from last 7 days   Lab Units 09/21/20  1116   WBC Thousand/uL 5 09   HEMOGLOBIN g/dL 5 6*   HEMATOCRIT % 22 9*   PLATELETS Thousands/uL 366   NEUTROS PCT % 69   LYMPHS PCT % 18   MONOS PCT % 10   EOS PCT % 2     Results from last 7 days   Lab Units 09/21/20  1116   POTASSIUM mmol/L 3 2*   CHLORIDE mmol/L 99*   CO2 mmol/L 27   BUN mg/dL 7   CREATININE mg/dL 0 66   CALCIUM mg/dL 8 2*   ALK PHOS U/L 98   ALT U/L 21   AST U/L 11           Imaging: I have personally reviewed pertinent reports        No results found     EKG, Pathology, and Other Studies Reviewed on Admission:   · EKG:  Normal EKG  Epic / Care Everywhere Records Reviewed: Yes     ** Please Note: This note has been constructed using a voice recognition system   **

## 2020-09-21 NOTE — ASSESSMENT & PLAN NOTE
Patient with history of iron deficiency anemia  Presents with symptomatic iron deficiency anemia (dizziness/lightheadedness)  Hemoglobin 5 6 on admission  Patient received type and cross, planned for transfuse with 2 units of packed red blood cells  Iron panel  - iron 14 (L), ferretin 6(L), iron sat 3, TIBC 531 (H)  B12, folate no deficit  No prior EGD  Cannot find prior colonoscopy in chart  She denies a SHAWNA, odynophagia, unintentional weight loss, melena or hematochezia  Possibly secondary to bleed versus inadequate dietary iron intake  GI consulted  Plan:  · Continue trending CBC  · Patient started on Venofer 300 mg IV for 2 doses (1 today, 1 tomorrow)  · Follow-up occult blood stool  Patient constipated, given MiraLax  · If hemoglobin is below 7, transfuse patient  · Follow up reticulocyte count  · GI consulted, will assess anemia cause, patient has history of Schreiber's esophagitis, chronic iron deficiency anemia, and has refused colonoscopy in the past   Will plan for EGD and colonoscopy to be done likely on Thursday  · Nutrition was consulted to discuss with patient  Nutritional, healthy foods rich in iron

## 2020-09-21 NOTE — ED NOTES
CALL FROM BLOOD BANK THAT PATIENT NEEDS TYPE AND SCREEN, WILL MAKE RN AWARE     Annika Carroll RN  09/21/20 [id number]

## 2020-09-22 ENCOUNTER — TELEPHONE (OUTPATIENT)
Dept: GASTROENTEROLOGY | Facility: AMBULARY SURGERY CENTER | Age: 60
End: 2020-09-22

## 2020-09-22 PROBLEM — R21 SKIN RASH: Status: ACTIVE | Noted: 2020-09-22

## 2020-09-22 LAB
ABO GROUP BLD BPU: NORMAL
ABO GROUP BLD BPU: NORMAL
ANION GAP SERPL CALCULATED.3IONS-SCNC: 8 MMOL/L (ref 4–13)
BASOPHILS # BLD AUTO: 0.05 THOUSANDS/ΜL (ref 0–0.1)
BASOPHILS NFR BLD AUTO: 1 % (ref 0–1)
BPU ID: NORMAL
BPU ID: NORMAL
BUN SERPL-MCNC: 8 MG/DL (ref 5–25)
CALCIUM SERPL-MCNC: 8.3 MG/DL (ref 8.3–10.1)
CHLORIDE SERPL-SCNC: 104 MMOL/L (ref 100–108)
CO2 SERPL-SCNC: 26 MMOL/L (ref 21–32)
CREAT SERPL-MCNC: 0.58 MG/DL (ref 0.6–1.3)
CROSSMATCH: NORMAL
CROSSMATCH: NORMAL
EOSINOPHIL # BLD AUTO: 0.09 THOUSAND/ΜL (ref 0–0.61)
EOSINOPHIL NFR BLD AUTO: 3 % (ref 0–6)
ERYTHROCYTE [DISTWIDTH] IN BLOOD BY AUTOMATED COUNT: 28.1 % (ref 11.6–15.1)
GFR SERPL CREATININE-BSD FRML MDRD: 101 ML/MIN/1.73SQ M
GLUCOSE SERPL-MCNC: 90 MG/DL (ref 65–140)
HCT VFR BLD AUTO: 28.6 % (ref 34.8–46.1)
HCT VFR BLD AUTO: 29.5 % (ref 34.8–46.1)
HGB BLD-MCNC: 8.2 G/DL (ref 11.5–15.4)
HGB BLD-MCNC: 8.4 G/DL (ref 11.5–15.4)
IMM GRANULOCYTES # BLD AUTO: 0.02 THOUSAND/UL (ref 0–0.2)
IMM GRANULOCYTES NFR BLD AUTO: 1 % (ref 0–2)
LYMPHOCYTES # BLD AUTO: 1 THOUSANDS/ΜL (ref 0.6–4.47)
LYMPHOCYTES NFR BLD AUTO: 29 % (ref 14–44)
MCH RBC QN AUTO: 18.9 PG (ref 26.8–34.3)
MCHC RBC AUTO-ENTMCNC: 28.7 G/DL (ref 31.4–37.4)
MCV RBC AUTO: 66 FL (ref 82–98)
MONOCYTES # BLD AUTO: 0.38 THOUSAND/ΜL (ref 0.17–1.22)
MONOCYTES NFR BLD AUTO: 11 % (ref 4–12)
NEUTROPHILS # BLD AUTO: 1.93 THOUSANDS/ΜL (ref 1.85–7.62)
NEUTS SEG NFR BLD AUTO: 55 % (ref 43–75)
NRBC BLD AUTO-RTO: 1 /100 WBCS
PLATELET # BLD AUTO: 283 THOUSANDS/UL (ref 149–390)
PMV BLD AUTO: 9 FL (ref 8.9–12.7)
POTASSIUM SERPL-SCNC: 3.8 MMOL/L (ref 3.5–5.3)
RBC # BLD AUTO: 4.35 MILLION/UL (ref 3.81–5.12)
RETICS # AUTO: NORMAL 10*3/UL (ref 14097–95744)
RETICS # CALC: 0.88 % (ref 0.37–1.87)
SARS-COV-2 RNA RESP QL NAA+PROBE: NEGATIVE
SODIUM SERPL-SCNC: 138 MMOL/L (ref 136–145)
TSH SERPL DL<=0.05 MIU/L-ACNC: 1.91 UIU/ML (ref 0.36–3.74)
UNIT DISPENSE STATUS: NORMAL
UNIT DISPENSE STATUS: NORMAL
UNIT PRODUCT CODE: NORMAL
UNIT PRODUCT CODE: NORMAL
UNIT RH: NORMAL
UNIT RH: NORMAL
WBC # BLD AUTO: 3.47 THOUSAND/UL (ref 4.31–10.16)

## 2020-09-22 PROCEDURE — 80048 BASIC METABOLIC PNL TOTAL CA: CPT | Performed by: PSYCHIATRY & NEUROLOGY

## 2020-09-22 PROCEDURE — 99222 1ST HOSP IP/OBS MODERATE 55: CPT | Performed by: INTERNAL MEDICINE

## 2020-09-22 PROCEDURE — 99232 SBSQ HOSP IP/OBS MODERATE 35: CPT | Performed by: INTERNAL MEDICINE

## 2020-09-22 PROCEDURE — 85014 HEMATOCRIT: CPT | Performed by: INTERNAL MEDICINE

## 2020-09-22 PROCEDURE — 87635 SARS-COV-2 COVID-19 AMP PRB: CPT | Performed by: INTERNAL MEDICINE

## 2020-09-22 PROCEDURE — 84443 ASSAY THYROID STIM HORMONE: CPT | Performed by: INTERNAL MEDICINE

## 2020-09-22 PROCEDURE — 85018 HEMOGLOBIN: CPT | Performed by: INTERNAL MEDICINE

## 2020-09-22 PROCEDURE — 85025 COMPLETE CBC W/AUTO DIFF WBC: CPT | Performed by: PSYCHIATRY & NEUROLOGY

## 2020-09-22 PROCEDURE — 85045 AUTOMATED RETICULOCYTE COUNT: CPT | Performed by: PSYCHIATRY & NEUROLOGY

## 2020-09-22 RX ORDER — MAGNESIUM CARB/ALUMINUM HYDROX 105-160MG
296 TABLET,CHEWABLE ORAL ONCE
Status: COMPLETED | OUTPATIENT
Start: 2020-09-22 | End: 2020-09-22

## 2020-09-22 RX ORDER — ONDANSETRON 2 MG/ML
4 INJECTION INTRAMUSCULAR; INTRAVENOUS EVERY 6 HOURS PRN
Status: DISCONTINUED | OUTPATIENT
Start: 2020-09-22 | End: 2020-09-24 | Stop reason: HOSPADM

## 2020-09-22 RX ORDER — LANOLIN ALCOHOL/MO/W.PET/CERES
CREAM (GRAM) TOPICAL 3 TIMES DAILY PRN
Status: DISCONTINUED | OUTPATIENT
Start: 2020-09-22 | End: 2020-09-24 | Stop reason: HOSPADM

## 2020-09-22 RX ORDER — POLYETHYLENE GLYCOL 3350 17 G/17G
17 POWDER, FOR SOLUTION ORAL ONCE
Status: COMPLETED | OUTPATIENT
Start: 2020-09-22 | End: 2020-09-22

## 2020-09-22 RX ORDER — PANTOPRAZOLE SODIUM 40 MG/1
40 TABLET, DELAYED RELEASE ORAL
Status: DISCONTINUED | OUTPATIENT
Start: 2020-09-22 | End: 2020-09-24 | Stop reason: HOSPADM

## 2020-09-22 RX ORDER — SUCRALFATE ORAL 1 G/10ML
1000 SUSPENSION ORAL EVERY 6 HOURS SCHEDULED
Status: DISCONTINUED | OUTPATIENT
Start: 2020-09-22 | End: 2020-09-23

## 2020-09-22 RX ORDER — CALCIUM CARBONATE 200(500)MG
500 TABLET,CHEWABLE ORAL 2 TIMES DAILY PRN
Status: DISCONTINUED | OUTPATIENT
Start: 2020-09-22 | End: 2020-09-24 | Stop reason: HOSPADM

## 2020-09-22 RX ADMIN — MAGNESIUM CITRATE 296 ML: 1.75 LIQUID ORAL at 15:16

## 2020-09-22 RX ADMIN — MUPIROCIN: 20 OINTMENT TOPICAL at 17:16

## 2020-09-22 RX ADMIN — QUETIAPINE FUMARATE 400 MG: 100 TABLET ORAL at 21:49

## 2020-09-22 RX ADMIN — PANTOPRAZOLE SODIUM 40 MG: 40 TABLET, DELAYED RELEASE ORAL at 15:16

## 2020-09-22 RX ADMIN — SUCRALFATE 1000 MG: 1 SUSPENSION ORAL at 17:15

## 2020-09-22 RX ADMIN — SUCRALFATE 1000 MG: 1 SUSPENSION ORAL at 21:49

## 2020-09-22 RX ADMIN — Medication 1 APPLICATION: at 21:51

## 2020-09-22 RX ADMIN — QUETIAPINE FUMARATE 400 MG: 100 TABLET ORAL at 00:17

## 2020-09-22 RX ADMIN — POLYETHYLENE GLYCOL 3350 17 G: 17 POWDER, FOR SOLUTION ORAL at 10:23

## 2020-09-22 RX ADMIN — IRON SUCROSE 300 MG: 20 INJECTION, SOLUTION INTRAVENOUS at 12:36

## 2020-09-22 RX ADMIN — SUCRALFATE 1000 MG: 1 SUSPENSION ORAL at 14:32

## 2020-09-22 RX ADMIN — CALCIUM CARBONATE (ANTACID) CHEW TAB 500 MG 500 MG: 500 CHEW TAB at 19:11

## 2020-09-22 RX ADMIN — PAROXETINE HYDROCHLORIDE 30 MG: 20 TABLET, FILM COATED ORAL at 08:30

## 2020-09-22 RX ADMIN — MUPIROCIN: 20 OINTMENT TOPICAL at 21:51

## 2020-09-22 RX ADMIN — ONDANSETRON 4 MG: 2 INJECTION INTRAMUSCULAR; INTRAVENOUS at 22:00

## 2020-09-22 RX ADMIN — ZIPRASIDONE HYDROCHLORIDE 80 MG: 20 CAPSULE ORAL at 08:30

## 2020-09-22 RX ADMIN — LEVOTHYROXINE SODIUM 25 MCG: 25 TABLET ORAL at 08:30

## 2020-09-22 RX ADMIN — LORAZEPAM 2 MG: 1 TABLET ORAL at 19:11

## 2020-09-22 NOTE — ASSESSMENT & PLAN NOTE
Stable  Patient with potassium of 3 2 on admission  Repleted with 40 mEq      Plan:  · Continue to monitor BMP  · Replete if needed

## 2020-09-22 NOTE — UTILIZATION REVIEW
Notification of Inpatient Admission/Inpatient Authorization Request   This is a Notification of Inpatient Admission for [facility]  Be advised that this patient was admitted to our facility under Inpatient Status  Contact Megan Alvarado at [phone] for additional admission information  Particia Notice [facility] DEPT  DEDICATED -[phone]  Patient Name:   Ping Liu   YOB: 1960       [address]   [address]:   [place]  Tax ID: [id number]  NPI: [id number] Attending Provider/NPI:  Address:  Phone: Raúl Oneill, Zelalem Trinh [[id number]]  Same as the facility  [phone]   Place of Service Code: 24 Place of Service Name:  [address] [place] [address]   Start Date: 9/21/20 1214     Discharge Date & Time: No discharge date for patient encounter  Type of Admission: Inpatient Status Discharge Disposition   (if discharged): Home with [address] [place] [employer]   Patient Diagnoses: Dizziness [R42]  Fatigue [R53 83]  Symptomatic anemia [D64 9]     Orders: Admission Orders (From admission, onward)     Ordered        09/21/20 1214  Inpatient Admission (expected length of stay for this patient Order details is greater than two midnights)  Once                    Assigned Utilization Review Contact: Megan Alvarado  Utilization   Network Utilization Review Department  Phone: [phone]; Fax [phone]  Email: [email or web address]   ATTENTION PAYERS: Please call the assigned Utilization  directly with any questions or concerns ALL voicemails in the department are confidential  Send all requests for admission clinical reviews, approved or denied determinations and any other requests to dedicated fax number belonging to the campus where the patient is receiving treatment

## 2020-09-22 NOTE — CONSULTS
Consultation - [facility]  Praneeth Munroe 61 y o  female MRN: [id number]  Unit/Bed#: W -01 Encounter: [id number]         Reason for Consult / Principal Problem: KEVIN anemia    HPI: Moses Fuchs is a 61year old female with history of schizoaffective disorder, hypothyroidism, GERD, Schreiber's esophagus, osteoporosis, and HTN who presented to the ED with symptoms of ongoing dizziness  On admission, her hemoglobin was 5 6  After 2 U, her hemoglobin went up to 8 2  MCV is 66  Iron sat 3%, total iron 14  She reports chronic pyrosis  She denies a SHAWNA, odynophagia, unintentional weight loss, melena or hematochezia  No abdominal imaging was performed on this admission  Patient reports that in the past, she had a history of Schreiber's esophagus  No previous EGD report is available to me  She reports that she was previously having her colonoscopy is with Colorectal surgery  Last colonoscopy report is not available to me either  Review of Systems:    CONSTITUTIONAL: Denies any fever, chills, or rigors  Good appetite, and no recent weight loss  HEENT: No earache or tinnitus  Denies hearing loss or visual disturbances  CARDIOVASCULAR: No chest pain or palpitations  RESPIRATORY: Denies any cough, hemoptysis, shortness of breath or dyspnea on exertion  GASTROINTESTINAL: As noted in the History of Present Illness  GENITOURINARY: No problems with urination  Denies any hematuria or dysuria  NEUROLOGIC: No dizziness or vertigo, denies headaches  MUSCULOSKELETAL: Denies any muscle or joint pain  SKIN: Denies skin rashes or itching  ENDOCRINE: Denies excessive thirst  Denies intolerance to heat or cold  PSYCHOSOCIAL: Denies depression or anxiety  Denies any recent memory loss         Historical Information   Past Medical History:   Diagnosis Date    Anxiety     Back pain at L4-L5 level     Schreiber esophagus     Bulimia     Disease of thyroid gland     hypothyroid    Ear problems  GERD (gastroesophageal reflux disease)     Hyperlipidemia     Hypothyroidism     Leukopenia     Nasal congestion     NMS (neuroleptic malignant syndrome) 01/03/2020    Rheumatoid arthritis involving right hip (HCC)     Sciatica     Seizure ([address] )     due to medication mix up 8/2018 only one historically    Synovial cyst of lumbar spine     Vertigo     Weight gain      Past Surgical History:   Procedure Laterality Date    BONE MARROW BIOPSY      BREAST SURGERY      enlargement     CLOSED REDUCTION DISTAL FEMUR FRACTURE      COLONOSCOPY      COSMETIC SURGERY      HIP ARTHROPLASTY Right 1/2/2020    Procedure: REVISION TOTAL HIP ARTHROPLASTY WITH REPAIR OF PARIPROSTHETIC FRACTURE - POSTERIOR APPROACH;  Surgeon: Vince Benítez MD;  [facility];  Service: Orthopedics    [place] TOTAL HIP ARTHROPLASTY Right 12/11/2019    Procedure: ARTHROPLASTY HIP TOTAL ANTERIOR;  Surgeon: Vince Benítez MD;  [facility];  Service: Orthopedics    RHINOPLASTY      SINUS SURGERY       Social History   Social History     Substance and Sexual Activity   Alcohol Use Never    Frequency: Never    Binge frequency: Never     Social History     Substance and Sexual Activity   Drug Use No     Social History     Tobacco Use   Smoking Status Never Smoker   Smokeless Tobacco Never Used     Family History   Problem Relation Age of Onset    Uterine cancer Mother     Esophageal cancer Father     Colon cancer Maternal Grandmother         Meds/Allergies     Current Facility-Administered Medications   Medication Dose Route Frequency    acetaminophen (TYLENOL) tablet 650 mg  650 mg Oral Q6H PRN    ammonium lactate (LAC-HYDRIN) 12 % lotion   Topical BID    diphenhydrAMINE (BENADRYL) tablet 25 mg  25 mg Oral Q6H PRN    iron sucrose (VENOFER) 300 mg in sodium chloride 0 9 % 250 mL IVPB  300 mg Intravenous Daily    levothyroxine tablet 25 mcg  25 mcg Oral Daily    LORazepam (ATIVAN) tablet 2 mg  2 mg Oral Q8H PRN  meclizine (ANTIVERT) tablet 25 mg  25 mg Oral TID PRN    pantoprazole (PROTONIX) EC tablet 40 mg  40 mg Oral BID AC    PARoxetine (PAXIL) tablet 30 mg  30 mg Oral Daily    QUEtiapine (SEROquel) tablet 400 mg  400 mg Oral HS    sucralfate (CARAFATE) oral suspension (ALVARO/PEDS) 1,000 mg  1,000 mg Oral Q6H [address]    ziprasidone (GEODON) capsule 80 mg  80 mg Oral Daily       Allergies   Allergen Reactions    Risperdal [Risperidone] Shortness Of Breath     Rapid heart beat, SOB    Zyprexa [Olanzapine] Shortness Of Breath     Rapid heartbeat    Latex Rash     Band aids, adhesives wears normal underwear, can eat bananas  USE PAPER TAPE         Objective     Blood pressure 138/83, pulse 92, temperature 98 3 °F (36 8 °C), resp  rate 18, height 5' 3" (1 6 m), weight 76 kg (167 lb 9 6 oz), SpO2 97 %  Intake/Output Summary (Last 24 hours) at 9/22/2020 1341  Last data filed at 9/22/2020 0900  Gross per 24 hour   Intake 2240 ml   Output 800 ml   Net 1440 ml         PHYSICAL EXAM:      General Appearance:   Alert and oriented x 3  Cooperative, and in no respiratory distress   HEENT:   Normocephalic, atraumatic, anicteric      Neck:  Supple, symmetrical, trachea midline   Lungs:   Clear to auscultation bilaterally; no rales, rhonchi or wheezing; respirations unlabored    Heart[de-identified]   S1 and S2 normal; regular rate and rhythm; no murmur, rub, or gallop     Abdomen:   Soft, non-tender, non-distended; normal bowel sounds; no masses, no organomegaly    Genitalia:   Deferred    Rectal:   Deferred    Extremities:  No cyanosis, clubbing or edema    Pulses:  2+ and symmetric all extremities    Skin:  Skin color, texture, turgor normal, no rashes or lesions    Lymph nodes:  No palpable cervical or supraclavicular lymphadenopathy        Lab Results:   Results from last 7 days   Lab Units 09/22/20  0521   WBC Thousand/uL 3 47*   HEMOGLOBIN g/dL 8 2*   HEMATOCRIT % 28 6*   PLATELETS Thousands/uL 283   NEUTROS PCT % 55   LYMPHS PCT % 29   MONOS PCT % 11   EOS PCT % 3     Results from last 7 days   Lab Units 09/22/20  0521 09/21/20  1116   POTASSIUM mmol/L 3 8 3 2*   CHLORIDE mmol/L 104 99*   CO2 mmol/L 26 27   BUN mg/dL 8 7   CREATININE mg/dL 0 58* 0 66   CALCIUM mg/dL 8 3 8 2*   ALK PHOS U/L  --  98   ALT U/L  --  21   AST U/L  --  11               Imaging Studies: I have personally reviewed pertinent imaging studies  No results found  ASSESSMENT and PLAN:      1) Symptomatic anemia, iron deficiency - Patient's hemoglobin improved to 8  MCV 66  She is currently receiving Venofer  She has not had endoscopic evaluation over 5 years  - EGD and colonoscopy planned for Thursday with 2 day preparation as the patient has history of chronic constipation secondary to medication   - Continue monitor hemoglobin closely and transfuse as necessary with a goal hemoglobin above 7  - Further recommendations based on endoscopic evaluation  - PPI BID    2) Reported Schreiber's esophagus, pyrosis - See plan above  The patient was seen and examined by Dr Gomez Logan, all [facility] medical decisions were made with Dr Gomez Logan  Thank you for allowing us to participate in the care of this pleasant patient  We will follow up with you closely

## 2020-09-22 NOTE — QUICK NOTE
DERM AMBASSADORSHIP NOTE      Amrita Jim  9/22/2020    [place] connect with Courtney Flannery from 9/22/2020 date reviewed in chart    Clinical Question:   Itchy bumps    Recommendation:  [facility] is [phone])   Photos of chest and legs show 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, linear excoriations   Could be related to fleas   Recommend eucerin to moisturize   Mupirocin for wound care   She has appointment with a dermatologist, or can make an appointment with above phone number

## 2020-09-22 NOTE — ASSESSMENT & PLAN NOTE
Continue psychiatric medications    Plan:  · Continue Geodon, Seroquel, Paxil, Ativan q8 p r n  Michael Mackenzie

## 2020-09-22 NOTE — PLAN OF CARE
Problem: Potential for Falls  Goal: Patient will remain free of falls  Description: INTERVENTIONS:  - Assess patient frequently for physical needs  -  Identify cognitive and physical deficits and behaviors that affect risk of falls    -  Draper fall precautions as indicated by assessment   - Educate patient/family on patient safety including physical limitations  - Instruct patient to call for assistance with activity based on assessment  - Modify environment to reduce risk of injury  - Consider OT/PT consult to assist with strengthening/mobility  Outcome: Progressing     Problem: HEMATOLOGIC - ADULT  Goal: Maintains hematologic stability  Description: INTERVENTIONS  - Assess for signs and symptoms of bleeding or hemorrhage  - Monitor labs  - Administer supportive blood products/factors as ordered and appropriate  Outcome: Progressing

## 2020-09-22 NOTE — ASSESSMENT & PLAN NOTE
Patient presents with skin rash on legs, chest, back that she reports as very itchy  She has HD all these locations to the point of scabbing/excoriations  Advised to stop scratching, as it may be source of infection  Patient reports that she has many fleas in her home, could be related to fleas  Took photos for epic media, ask for dermatology opinion  Per Dermatology, photos of chest and legs show 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, linear excoriations    Plan:  · Continue Lac-Hydrin lotion  · Start mupirocin for wound care  · Start Eucerin cream for mositurizing wounds    · She has appointment with a dermatologist, or can make an appointment with above phone number

## 2020-09-22 NOTE — TELEPHONE ENCOUNTER
Patients GI provider:  ole    Number to return call: (  [phone]    Reason for call: Pt states that she was seen by dr Will Jolly today as inpatient and that she has a few more questions for him, in  Room 433 at [facility]    Scheduled procedure/appointment date if applicable: Apt/procedure  na

## 2020-09-22 NOTE — ASSESSMENT & PLAN NOTE
Stable  Patient hyponatremic at admission  Na 133  Possibly secondary to psychiatric medications ([facility])      Plan:  · Monitor BMP

## 2020-09-22 NOTE — TELEPHONE ENCOUNTER
I spoke to patient who is currently inpatient at [facility]  She said all her questions were answered by the nurse

## 2020-09-22 NOTE — PROGRESS NOTES
Progress Note - Ping Liu 1960, 61 y o  female MRN: [id number]    Unit/Bed#: W -01 Encounter: [id number]    Primary Care Provider: Josesito Garcia MD   Date and time admitted to hospital: 9/21/2020 10:25 AM        * Symptomatic anemia  Assessment & Plan  Patient with history of iron deficiency anemia  Presents with symptomatic iron deficiency anemia (dizziness/lightheadedness)  Hemoglobin 5 6 on admission  Patient received type and cross, planned for transfuse with 2 units of packed red blood cells  Iron panel  - iron 14 (L), ferretin 6(L), iron sat 3, TIBC 531 (H)  B12, folate no deficit  No prior EGD  Cannot find prior colonoscopy in chart  She denies a SHAWNA, odynophagia, unintentional weight loss, melena or hematochezia  Possibly secondary to bleed versus inadequate dietary iron intake  GI consulted  Plan:  · Continue trending CBC  · Patient started on Venofer 300 mg IV for 2 doses (1 today, 1 tomorrow)  · Follow-up occult blood stool  Patient constipated, given MiraLax  · If hemoglobin is below 7, transfuse patient  · Follow up reticulocyte count  · GI consulted, will assess anemia cause, patient has history of Schreiber's esophagitis, chronic iron deficiency anemia, and has refused colonoscopy in the past   Will plan for EGD and colonoscopy to be done likely on Thursday  · Nutrition was consulted to discuss with patient  Nutritional, healthy foods rich in iron  Skin rash  Assessment & Plan  Patient presents with skin rash on legs, chest, back that she reports as very itchy  She has HD all these locations to the point of scabbing/excoriations  Advised to stop scratching, as it may be source of infection  Patient reports that she has many fleas in her home, could be related to fleas  Took photos for epic media, ask for dermatology opinion    Per Dermatology, photos of chest and legs show 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, linear excoriations    Plan:  · Continue Lac-Hydrin lotion  · Start mupirocin for wound care  · Start Eucerin cream for mositurizing wounds  · She has appointment with a dermatologist, or can make an appointment with above phone number    Hypokalemia  Assessment & Plan  Stable  Patient with potassium of 3 2 on admission  Repleted with 40 mEq  Plan:  · Continue to monitor BMP  · Replete if needed    Schizoaffective disorder, depressive type ([address] )  Assessment & Plan  Continue psychiatric medications    Plan:  · Continue Geodon, Seroquel, Paxil, Ativan q8 p r n  Hyponatremia  Assessment & Plan  Stable  Patient hyponatremic at admission  [address]  Possibly secondary to psychiatric medications ([facility])  Plan:  · Monitor BMP    GERD (gastroesophageal reflux disease)  Assessment & Plan  History of Schreiber's esophagitis  No history of EGD in chart review  Plan:  ·   Protonix b i d  Acquired hypothyroidism  Assessment & Plan  Patient with history of hypothyroidism  Stable  Plan:  · Continue levothyroxine 20 mcg        VTE Pharmacologic Prophylaxis:   Pharmacologic: Pharmacologic VTE Prophylaxis contraindicated due to possible bleed  Mechanical VTE Prophylaxis in Place: Yes    Discussions with Specialists or Other Care Team Provider:  Dermatology, GI    Education and Discussions with Family / Patient:  Discussed with patient  Current Length of Stay: 1 day(s)    Current Patient Status: Inpatient     Discharge Plan / Estimated Discharge Date:  Greater than 48 hours    Code Status: Level 1 - Full Code      Subjective:   Patient reports doing much better overnight  After her 2 units of packed red blood cells, reports feeling energized, no more fatigued  She understands the plan today for a GI consult and a dermatology consult  She reports no nausea, vomiting, constipation, diarrhea, blood in urine or stool, bleeding from any sites  Denies lightheadedness, dizziness    Endorses this itchy rash across her body of excoriations, but is not itching them     Objective:     Vitals:   Temp (24hrs), Av 3 °F (36 8 °C), Min:97 2 °F (36 2 °C), Max:99 °F (37 2 °C)    Temp:  [97 2 °F (36 2 °C)-99 °F (37 2 °C)] 98 3 °F (36 8 °C)  HR:  [86-99] 92  Resp:  [16-19] 18  BP: (127-150)/(70-89) 138/83  SpO2:  [97 %-99 %] 97 %  Body mass index is 29 69 kg/m²  Input and Output Summary (last 24 hours): Intake/Output Summary (Last 24 hours) at 2020 1554  Last data filed at 2020 1406  Gross per 24 hour   Intake 2490 ml   Output 800 ml   Net 1690 ml       Physical Exam:     Physical Exam  Vitals signs and nursing note reviewed  Constitutional:       General: She is not in acute distress  Appearance: Normal appearance  She is well-developed  She is not diaphoretic  HENT:      Head: Normocephalic and atraumatic  Mouth/Throat:      Mouth: Mucous membranes are moist    Eyes:      General: No scleral icterus  Right eye: No discharge  Left eye: No discharge  Conjunctiva/sclera: Conjunctivae normal       Comments: No pale conjunctiva   Cardiovascular:      Rate and Rhythm: Normal rate and regular rhythm  Pulmonary:      Effort: Pulmonary effort is normal  No respiratory distress  Breath sounds: Normal breath sounds  No wheezing  Abdominal:      General: Bowel sounds are normal       Palpations: Abdomen is soft  Tenderness: There is no abdominal tenderness  Musculoskeletal:      Right lower leg: No edema  Left lower leg: No edema  Skin:     General: Skin is warm and dry  Comments: Excoriations across the body, scabbed over  Neurological:      Mental Status: She is alert and oriented to person, place, and time  Psychiatric:         Behavior: Behavior normal          Thought Content:  Thought content normal          Judgment: Judgment normal        Additional Data:     Labs:    Results from last 7 days   Lab Units 20  0521   WBC Thousand/uL 3 47*   HEMOGLOBIN g/dL 8 2*   HEMATOCRIT % 28 6*   PLATELETS Thousands/uL 283   NEUTROS PCT % 55   LYMPHS PCT % 29   MONOS PCT % 11   EOS PCT % 3     Results from last 7 days   Lab Units 09/22/20  0521 09/21/20  1116   POTASSIUM mmol/L 3 8 3 2*   CHLORIDE mmol/L 104 99*   CO2 mmol/L 26 27   BUN mg/dL 8 7   CREATININE mg/dL 0 58* 0 66   CALCIUM mg/dL 8 3 8 2*   ALK PHOS U/L  --  98   ALT U/L  --  21   AST U/L  --  11           * I Have Reviewed All Lab Data Listed Above  * Additional Pertinent Lab Tests Reviewed:  [address] Admission Reviewed    Imaging:    Imaging Reports Reviewed Today Include:  None  Imaging Personally Reviewed by Myself Includes:  None    Recent Cultures (last 7 days):           Last 24 Hours Medication List:   Current Facility-Administered Medications   Medication Dose Route Frequency Provider Last Rate    acetaminophen  650 mg Oral Q6H PRN Venita Lu MD      ammonium lactate   Topical BID Alpiniamiguel Duque MD      [START ON 9/23/2020] bisacodyl  10 mg Oral Once Eloy Gibbons PA-C      diphenhydrAMINE  25 mg Oral Q6H PRN Venita Lu MD      iron sucrose  300 mg Intravenous Daily Fahad Lopez MD Stopped (09/22/20 1406)    levothyroxine  25 mcg Oral Daily Fahad Lopez MD      LORazepam  2 mg Oral Q8H PRN Fahad Lopez MD      meclizine  25 mg Oral TID PRN Fahad Lopez MD      mupirocin   Topical TID Fahad Lopez MD      pantoprazole  40 mg Oral BID AC Eloy Gibbons PA-C      PARoxetine  30 mg Oral Daily Fahad Lopez MD      [START ON 9/23/2020] polyethylene glycol  4,000 mL Oral Once Eloy Gibbons PA-C      QUEtiapine  400 mg Oral HS Fahad Lopez MD      sucralfate  1,000 mg Oral Q6H [address] Eloy Gibbons PA-C      white petrolatum-mineral oil   Topical TID PRN Fahad Lopez MD      ziprasidone  80 mg Oral Daily Fahad Lopez MD          Today, Patient Was Seen By: Fahad Lopez MD    ** Please Note: This note has been constructed using a voice recognition system   **

## 2020-09-22 NOTE — UTILIZATION REVIEW
Initial Clinical Review    Admission: Date/Time/Statement:   Admission Orders (From admission, onward)     Ordered        09/21/20 1214  Inpatient Admission (expected length of stay for this patient Order details is greater than two midnights)  Once                   Orders Placed This Encounter   Procedures    Inpatient Admission (expected length of stay for this patient Order details is greater than two midnights)     Standing Status:   Standing     Number of Occurrences:   1     Order Specific Question:   Admitting Physician     Answer:   Amber Vizcaino [[id number]]     Order Specific Question:   Level of Care     Answer:   Med Surg [[id number]]     Order Specific Question:   Estimated length of stay     Answer:   More than 2 Midnights     Order Specific Question:   Certification     Answer:   I certify that inpatient services are medically necessary for this patient for a duration of greater than two midnights  See H&P and MD Progress Notes for additional information about the patient's course of treatment  ED Arrival Information     Expected Arrival Acuity Means of Arrival Escorted By Service Admission Type    - 9/21/2020 10:25 Urgent Ambulance [facility] Hospitalist Urgent    Arrival Complaint    Dizziness        Chief Complaint   Patient presents with    Vertigo - Recurrent     Pt states that she has been dizzy the past couple of days with it ending around 6am   Pt states that she is now just extremely fatigued  Pt states that she was diagnosed with vertigo but did not take her meclizine  Assessment/Plan: 60 yo female to ED via ambulance with c/odizziness and fatigue which happened upon awakening this am such that she could barely walk   HX includes iron deficient anemia, hypothyroid , GERD, anxiety, recently dx of vertigo on Meclizine ekg unremarkable, labs drawn showing low Na and potassium and low hgb of 5 6, HCT=22 9  MCV low indicating macrocytic anemia  The patient denies any type of bleeding  Pt transfused with 2 units PRBC's  Pt also given potasium po replacement  Exam shows scratches on skin from pt scratching self, pale skin  Pt admitted as inpatient with dx of symptomatic anemia, hypokalemia and hyponatremia  Will do labs to follow lytes and H/H, stool for occult blood, and replete lytes if needed  9/22   GI consult ordered today  Dermatology- skin scratches could be due to fleas, recommend eucerin to moisturize and mupirocin for wound care with outpt f/u  GI- pt reports hx of Schreiber's esophagus and pyrosis  Hgb remains low but up to 8 with MCV of 66  Pt is on Venofer and hasn't had endoscopy x 5 yrs at least  Plan EGD and C-scope 9/24 with 2 day prep due to hx chronic constipation   Continue to monitor Hgb  PPI BID     ED Triage Vitals   Temperature Pulse Respirations Blood Pressure SpO2   09/21/20 1030 09/21/20 1030 09/21/20 1030 09/21/20 1030 09/21/20 1030   98 3 °F (36 8 °C) 92 16 133/71 100 %      Temp Source Heart Rate Source Patient Position - Orthostatic VS BP Location FiO2 (%)   09/21/20 1030 09/21/20 1030 09/21/20 1030 09/21/20 1030 --   Oral Monitor Lying Right arm       Pain Score       09/21/20 1522       No Pain          Wt Readings from Last 1 Encounters:   09/21/20 76 kg (167 lb 9 6 oz)     Additional Vital Signs:   Date/Time   Temp   Pulse   Resp   BP   MAP (mmHg)   SpO2     09/22/20 07:36:58   98 3 °F (36 8 °C)   92   18   138/83   101   97 %     09/21/20 23:03:36   99 °F (37 2 °C)   92   18   150/89   109   97 %     09/21/20 2300   99 °F (37 2 °C)   99   19   150/89           09/21/20 22:36:54   97 9 °F (36 6 °C)   93   18   144/87   106   97 %     09/21/20 21:11:19   98 8 °F (37 1 °C)   86   16         98 %     09/21/20 20:37:25   97 7 °F (36 5 °C)   92   18   147/83   104   98 %     09/21/20 2007   97 4 °F (36 3 °C)Abnormal     96   17   150/83           09/21/20 18:55:48   98 °F (36 7 °C)   91      140/81   101   98 %     09/21/20 17:11:32   98 6 °F (37 °C)   95    141/83   102   98 %         Pertinent Labs/Diagnostic Test Results:   EKG 9/21  Normal sinus rhythm      Results from last 7 days   Lab Units 09/22/20  0521 09/22/20  0004 09/21/20  1116   WBC Thousand/uL 3 47*  --  5 09   HEMOGLOBIN g/dL 8 2* 8 4* 5 6*   HEMATOCRIT % 28 6* 29 5* 22 9*   PLATELETS Thousands/uL 283  --  366   NEUTROS ABS Thousands/µL 1 93  --  3 55         Results from last 7 days   Lab Units 09/22/20  0521 09/21/20  1116   SODIUM mmol/L 138 133*   POTASSIUM mmol/L 3 8 3 2*   CHLORIDE mmol/L 104 99*   CO2 mmol/L 26 27   ANION GAP mmol/L 8 7   BUN mg/dL 8 7   CREATININE mg/dL 0 58* 0 66   EGFR ml/min/1 73sq m 101 96   CALCIUM mg/dL 8 3 8 2*     Results from last 7 days   Lab Units 09/21/20  1116   AST U/L 11   ALT U/L 21   ALK PHOS U/L 98   TOTAL PROTEIN g/dL 6 7   ALBUMIN g/dL 3 0*   TOTAL BILIRUBIN mg/dL 0 17*     Results from last 7 days   Lab Units 09/21/20  1105   POC GLUCOSE mg/dl 89     Results from last 7 days   Lab Units 09/22/20  0521 09/21/20  1116   GLUCOSE RANDOM mg/dL 90 96           Results from last 7 days   Lab Units 09/21/20  1116   TROPONIN I ng/mL <0 02         Results from last 7 days   Lab Units 09/22/20  0521   TSH 3RD GENERATON uIU/mL 1 909           Results from last 7 days   Lab Units 09/21/20  1644   FERRITIN ng/mL 6*                  Past Medical History:   Diagnosis Date    Anxiety     Back pain at L4-L5 level     Schreiber esophagus     Bulimia     Disease of thyroid gland     hypothyroid    Ear problems     GERD (gastroesophageal reflux disease)     Hyperlipidemia     Hypothyroidism     Leukopenia     Nasal congestion     NMS (neuroleptic malignant syndrome) 01/03/2020    Rheumatoid arthritis involving right hip (HCC)     Sciatica     Seizure ([address] )     due to medication mix up 8/2018 only one historically    Synovial cyst of lumbar spine     Vertigo     Weight gain      Present on Admission:   GERD (gastroesophageal reflux disease)   Symptomatic anemia   Hyponatremia   Hypokalemia   ABIMAEL (generalized anxiety disorder)   Schizoaffective disorder, depressive type (HCC)   Acquired hypothyroidism   Multiple excoriations      Admitting Diagnosis: Dizziness [R42]  Fatigue [R53 83]  Symptomatic anemia [D64 9]  Age/Sex: 61 y o  female  Admission Orders:  Scheduled Medications:  ammonium lactate, , Topical, BID  iron sucrose, 300 mg, Intravenous, Daily  levothyroxine, 25 mcg, Oral, Daily  pantoprazole, 40 mg, Oral, HS  PARoxetine, 30 mg, Oral, Daily  QUEtiapine, 400 mg, Oral, HS  ziprasidone, 80 mg, Oral, Daily           PRN Meds:  acetaminophen, 650 mg, Oral, Q6H PRN  diphenhydrAMINE, 25 mg, Oral, Q6H PRN  LORazepam, 2 mg, Oral, Q8H PRN  meclizine, 25 mg, Oral, TID PRN  potassium chloride (K-DUR,KLOR-CON) CR tablet 40 mEq    Dose: 40 mEq  Freq: Once Route: PO x1 9/21  polyethylene glycol (MIRALAX) packet 17 g    Dose: 17 g  Freq: Once Route: PO x1 9/22    SCD's  Reg diet  stool for occult blood    IP CONSULT TO NUTRITION SERVICES  IP CONSULT TO GASTROENTEROLOGY    Network Utilization Review Department  [email or web address]  ATTENTION: Please call with any questions or concerns to [phone] and carefully listen to the prompts so that you are directed to the right person  All voicemails are confidential   Sheeba Grand all requests for admission clinical reviews, approved or denied determinations and any other requests to dedicated fax number below belonging to the campus where the patient is receiving treatment   List of dedicated fax numbers for the Facilities:  FACILITY NAME UR FAX NUMBER   ADMISSION DENIALS (Administrative/Medical Necessity) [phone]   [address] (Maternity/NICU/Pediatrics) [phone]   Idalmis Adorno [phone]   Philm Blaine [phone]   [facility] Form [phone]   [address] [place] [address] [phone]   Tamika Christiansen Rosio Hanna [phone]   Kadie CALIXTO/[facility] [phone]   [facility] [phone] [address] [phone]

## 2020-09-22 NOTE — PLAN OF CARE
Problem: Potential for Falls  Goal: Patient will remain free of falls  Description: INTERVENTIONS:  - Assess patient frequently for physical needs  -  Identify cognitive and physical deficits and behaviors that affect risk of falls    -  [place] fall precautions as indicated by assessment   - Educate patient/family on patient safety including physical limitations  - Instruct patient to call for assistance with activity based on assessment  - Modify environment to reduce risk of injury  - Consider OT/PT consult to assist with strengthening/mobility  Outcome: Progressing     Problem: HEMATOLOGIC - ADULT  Goal: Maintains hematologic stability  Description: INTERVENTIONS  - Assess for signs and symptoms of bleeding or hemorrhage  - Monitor labs  - Administer supportive blood products/factors as ordered and appropriate  Outcome: Progressing

## 2020-09-23 PROBLEM — E87.1 HYPONATREMIA: Status: RESOLVED | Noted: 2020-01-27 | Resolved: 2020-09-23

## 2020-09-23 LAB
ANION GAP SERPL CALCULATED.3IONS-SCNC: 8 MMOL/L (ref 4–13)
BASOPHILS # BLD AUTO: 0.06 THOUSANDS/ΜL (ref 0–0.1)
BASOPHILS NFR BLD AUTO: 2 % (ref 0–1)
BUN SERPL-MCNC: 10 MG/DL (ref 5–25)
CALCIUM SERPL-MCNC: 8.1 MG/DL (ref 8.3–10.1)
CHLORIDE SERPL-SCNC: 103 MMOL/L (ref 100–108)
CO2 SERPL-SCNC: 28 MMOL/L (ref 21–32)
CREAT SERPL-MCNC: 0.56 MG/DL (ref 0.6–1.3)
EOSINOPHIL # BLD AUTO: 0.13 THOUSAND/ΜL (ref 0–0.61)
EOSINOPHIL NFR BLD AUTO: 3 % (ref 0–6)
ERYTHROCYTE [DISTWIDTH] IN BLOOD BY AUTOMATED COUNT: 28.8 % (ref 11.6–15.1)
GFR SERPL CREATININE-BSD FRML MDRD: 102 ML/MIN/1.73SQ M
GLUCOSE SERPL-MCNC: 85 MG/DL (ref 65–140)
HCT VFR BLD AUTO: 28.2 % (ref 34.8–46.1)
HEMOCCULT STL QL: POSITIVE
HGB BLD-MCNC: 7.9 G/DL (ref 11.5–15.4)
IMM GRANULOCYTES # BLD AUTO: 0.02 THOUSAND/UL (ref 0–0.2)
IMM GRANULOCYTES NFR BLD AUTO: 1 % (ref 0–2)
LYMPHOCYTES # BLD AUTO: 0.88 THOUSANDS/ΜL (ref 0.6–4.47)
LYMPHOCYTES NFR BLD AUTO: 22 % (ref 14–44)
MCH RBC QN AUTO: 18.9 PG (ref 26.8–34.3)
MCHC RBC AUTO-ENTMCNC: 28 G/DL (ref 31.4–37.4)
MCV RBC AUTO: 67 FL (ref 82–98)
MONOCYTES # BLD AUTO: 0.53 THOUSAND/ΜL (ref 0.17–1.22)
MONOCYTES NFR BLD AUTO: 13 % (ref 4–12)
NEUTROPHILS # BLD AUTO: 2.42 THOUSANDS/ΜL (ref 1.85–7.62)
NEUTS SEG NFR BLD AUTO: 59 % (ref 43–75)
NRBC BLD AUTO-RTO: 2 /100 WBCS
PLATELET # BLD AUTO: 263 THOUSANDS/UL (ref 149–390)
PMV BLD AUTO: 9 FL (ref 8.9–12.7)
POTASSIUM SERPL-SCNC: 3.9 MMOL/L (ref 3.5–5.3)
RBC # BLD AUTO: 4.19 MILLION/UL (ref 3.81–5.12)
SODIUM SERPL-SCNC: 139 MMOL/L (ref 136–145)
WBC # BLD AUTO: 4.04 THOUSAND/UL (ref 4.31–10.16)

## 2020-09-23 PROCEDURE — 99232 SBSQ HOSP IP/OBS MODERATE 35: CPT | Performed by: INTERNAL MEDICINE

## 2020-09-23 PROCEDURE — 85025 COMPLETE CBC W/AUTO DIFF WBC: CPT | Performed by: PSYCHIATRY & NEUROLOGY

## 2020-09-23 PROCEDURE — 82272 OCCULT BLD FECES 1-3 TESTS: CPT | Performed by: PSYCHIATRY & NEUROLOGY

## 2020-09-23 PROCEDURE — 80048 BASIC METABOLIC PNL TOTAL CA: CPT | Performed by: PSYCHIATRY & NEUROLOGY

## 2020-09-23 RX ORDER — SUCRALFATE 1 G/1
1 TABLET ORAL EVERY 6 HOURS SCHEDULED
Status: DISCONTINUED | OUTPATIENT
Start: 2020-09-23 | End: 2020-09-24 | Stop reason: HOSPADM

## 2020-09-23 RX ADMIN — LEVOTHYROXINE SODIUM 25 MCG: 25 TABLET ORAL at 08:29

## 2020-09-23 RX ADMIN — PAROXETINE HYDROCHLORIDE 30 MG: 20 TABLET, FILM COATED ORAL at 08:29

## 2020-09-23 RX ADMIN — POLYETHYLENE GLYCOL 3350, SODIUM SULFATE ANHYDROUS, SODIUM BICARBONATE, SODIUM CHLORIDE, POTASSIUM CHLORIDE 4000 ML: 236; 22.74; 6.74; 5.86; 2.97 POWDER, FOR SOLUTION ORAL at 12:53

## 2020-09-23 RX ADMIN — SUCRALFATE 1 G: 1 TABLET ORAL at 17:28

## 2020-09-23 RX ADMIN — PANTOPRAZOLE SODIUM 40 MG: 40 TABLET, DELAYED RELEASE ORAL at 05:35

## 2020-09-23 RX ADMIN — QUETIAPINE FUMARATE 400 MG: 100 TABLET ORAL at 21:28

## 2020-09-23 RX ADMIN — IRON SUCROSE 300 MG: 20 INJECTION, SOLUTION INTRAVENOUS at 08:28

## 2020-09-23 RX ADMIN — PANTOPRAZOLE SODIUM 40 MG: 40 TABLET, DELAYED RELEASE ORAL at 17:28

## 2020-09-23 RX ADMIN — ZIPRASIDONE HYDROCHLORIDE 80 MG: 20 CAPSULE ORAL at 08:29

## 2020-09-23 RX ADMIN — BISACODYL 10 MG: 5 TABLET, COATED ORAL at 17:28

## 2020-09-23 RX ADMIN — SUCRALFATE 1 G: 1 TABLET ORAL at 12:52

## 2020-09-23 NOTE — PROGRESS NOTES
Progress Note - Sharyle June 1960, 61 y o  female MRN: [id number]    Unit/Bed#: W -01 Encounter: [id number]    Primary Care Provider: Yuridia Morales MD   Date and time admitted to hospital: 9/21/2020 10:25 AM        * Symptomatic anemia  Assessment & Plan  Stable  Patient with history of iron deficiency anemia  Presents with symptomatic iron deficiency anemia (dizziness/lightheadedness)  Hemoglobin 5 6 on admission  Patient received type and cross, planned for transfuse with 2 units of packed red blood cells  Iron panel  - iron 14 (L), ferretin 6(L), iron sat 3, TIBC 531 (H)  B12, folate no deficit  No prior EGD  Cannot find prior colonoscopy in chart  Overnight, Hg down to 7 9 (from 8 2, 8 4, 5 6)  Fecal occult blood still pending  NPO today for Thursday EGD / colonoscopy  Reticulocyte count came back 41,100 (0 88 retic count %) - normal     She denies a SHAWNA, odynophagia, unintentional weight loss, melena or hematochezia  Possibly secondary to bleed versus inadequate dietary iron intake  GI consulted  Plan:  · EGD and colonoscopy tomorrow  · NPO  Bowel prep at 1400 today  · Continue trending CBC  · Patient started on Venofer 300 mg IV for 5 days  · Follow-up occult blood stool - pending - patient constipated, given miralax  · If hemoglobin is below 7, transfuse patient  · Follow up reticulocyte count  · GI consulted, will assess anemia cause, patient has history of Schreiber's esophagitis, chronic iron deficiency anemia, and has refused colonoscopy in the past     · Nutrition was consulted to discuss with patient  Nutritional, healthy foods rich in iron  Skin rash  Assessment & Plan  Patient presents with skin rash on legs, chest, back that she reports as very itchy  She has HD all these locations to the point of scabbing/excoriations  Advised to stop scratching, as it may be source of infection    Patient reports that she has many fleas in her home, could be related to fleas  Took photos for epic media, ask for dermatology opinion  Per Dermatology, photos of chest and legs show 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, linear excoriations    Plan:  · Continue Lac-Hydrin lotion  · Continue mupirocin for wound care  · Continue Eucerin cream for mositurizing wounds  · Continue benadryl  · She has appointment with a dermatologist, or can make an appointment with phone number on after visit summary  Schizoaffective disorder, depressive type ([facility] [address] )  Assessment & Plan  Continue psychiatric medications    Plan:  · Continue Geodon, Seroquel, Paxil, Ativan q8 p r n  Kadi Chiang GERD (gastroesophageal reflux disease)  Assessment & Plan  Reports episode of acid reflux this morning  History of Schreiber's esophagitis  No history of EGD in chart review  Plan:  ·   Protonix b i d     ABIMAEL (generalized anxiety disorder)  Assessment & Plan  Patient has history anxiety  Plan:  · Continue paroxetine 30 mg daily  · Continue Ativan 2 mg q8h      VTE Pharmacologic Prophylaxis:   Pharmacologic: Pharmacologic VTE Prophylaxis contraindicated due to possible bleed  Mechanical VTE Prophylaxis in Place: Yes    Discussions with Specialists or Other Care Team Provider: GI, nutritional, nursing, case management  Education and Discussions with Family / Patient: with patient  Current Length of Stay: 2 day(s)    Current Patient Status: Inpatient     Discharge Plan / Estimated Discharge Date: 24-48 hrs    Code Status: Level 1 - Full Code      Subjective:   Patient reports severe episodeof gerd, not controlled with medication  Coughing a lot  ANxious about this  Endorses constipation, no stools since arrival at hospital   Denies headache, lightheadedness, nausea, vomiting, abdominal pain  She refused carafate, moisturizing cream, and ammonium lactate because didnt want them  ROS otherwise negative      Objective:     Vitals:   Temp (24hrs), Av 4 °F (36 9 °C), Min:98 3 °F (36 8 °C), Max:98 5 °F (36 9 °C)    Temp:  [98 3 °F (36 8 °C)-98 5 °F (36 9 °C)] 98 3 °F (36 8 °C)  HR:  [64-85] 64  Resp:  [18] 18  BP: (114-135)/(58-75) 135/75  SpO2:  [93 %-99 %] 99 %  Body mass index is 29 69 kg/m²  Input and Output Summary (last 24 hours): Intake/Output Summary (Last 24 hours) at 9/23/2020 1641  Last data filed at 9/23/2020 1614  Gross per 24 hour   Intake 2970 ml   Output 950 ml   Net 2020 ml       Physical Exam:     Physical Exam  Vitals signs and nursing note reviewed  Constitutional:       General: She is not in acute distress  Appearance: Normal appearance  She is well-developed  She is not diaphoretic  HENT:      Head: Normocephalic and atraumatic  Eyes:      General: No scleral icterus  Right eye: No discharge  Left eye: No discharge  Conjunctiva/sclera: Conjunctivae normal    Cardiovascular:      Rate and Rhythm: Normal rate and regular rhythm  Pulmonary:      Effort: Pulmonary effort is normal  No respiratory distress  Breath sounds: Normal breath sounds  No wheezing or rales  Abdominal:      General: Abdomen is flat  Bowel sounds are normal  There is no distension  Palpations: Abdomen is soft  Skin:     General: Skin is warm and dry  Comments: Excoriations on legs, back, chest - same as yesterday   Neurological:      General: No focal deficit present  Mental Status: She is alert and oriented to person, place, and time  Psychiatric:         Behavior: Behavior normal          Thought Content:  Thought content normal          Judgment: Judgment normal        Additional Data:     Labs:    Results from last 7 days   Lab Units 09/23/20  0532   WBC Thousand/uL 4 04*   HEMOGLOBIN g/dL 7 9*   HEMATOCRIT % 28 2*   PLATELETS Thousands/uL 263   NEUTROS PCT % 59   LYMPHS PCT % 22   MONOS PCT % 13*   EOS PCT % 3     Results from last 7 days   Lab Units 09/23/20  0531  09/21/20  1116   POTASSIUM mmol/L 3 9   < > 3 2*   CHLORIDE mmol/L 103   < > 99*   CO2 mmol/L 28   < > 27   BUN mg/dL 10   < > 7   CREATININE mg/dL 0 56*   < > 0 66   CALCIUM mg/dL 8 1*   < > 8 2*   ALK PHOS U/L  --   --  98   ALT U/L  --   --  21   AST U/L  --   --  11    < > = values in this interval not displayed  * I Have Reviewed All Lab Data Listed Above  * Additional Pertinent Lab Tests Reviewed: [address] Admission Reviewed    Imaging:    Imaging Reports Reviewed Today Include: none  Imaging Personally Reviewed by Myself Includes:  none    Recent Cultures (last 7 days):           Last 24 Hours Medication List:   Current Facility-Administered Medications   Medication Dose Route Frequency Provider Last Rate    acetaminophen  650 mg Oral Q6H PRN Latanya Garcia MD      ammonium lactate   Topical BID Alpiniano ANAIS Duque MD      bisacodyl  10 mg Oral Once Ray Meka, PA-C      calcium carbonate  500 mg Oral BID PRN Reji Lancaster DO      diphenhydrAMINE  25 mg Oral Q6H PRN Latanya Garcia MD      [START ON 9/24/2020] iron sucrose  300 mg Intravenous Daily Rajiv Ansari MD      levothyroxine  25 mcg Oral Daily Simone Kawasaki, MD      LORazepam  2 mg Oral Q8H PRN Simone Kawasaki, MD      meclizine  25 mg Oral TID PRN Simone Kawasaki, MD      mupirocin   Topical TID Simone Kawasaki, MD      ondansetron  4 mg Intravenous Q6H PRN Latanya Garcia MD      pantoprazole  40 mg Oral BID AC Ramon Ackerman, PA-C      PARoxetine  30 mg Oral Daily Simone Kawasaki, MD      QUEtiapine  400 mg Oral HS Simone Kawasaki, MD      sucralfate  1 g Oral Q6H [address] Ray Hill, PA-C      white petrolatum-mineral oil   Topical TID PRN Simone Kawasaki, MD      ziprasidone  80 mg Oral Daily Simone Kawasaki, MD          Today, Patient Was Seen By: Simone Kawasaki, MD    ** Please Note: This note has been constructed using a voice recognition system   **

## 2020-09-23 NOTE — ASSESSMENT & PLAN NOTE
Stable  Patient with history of iron deficiency anemia  Presents with symptomatic iron deficiency anemia (dizziness/lightheadedness)  Hemoglobin 5 6 on admission  Patient received type and cross, planned for transfuse with 2 units of packed red blood cells  Iron panel  - iron 14 (L), ferretin 6(L), iron sat 3, TIBC 531 (H)  B12, folate no deficit  No prior EGD  Cannot find prior colonoscopy in chart  Overnight, Hg down to 7 9 (from 8 2, 8 4, 5 6)  Fecal occult blood still pending  NPO today for Thursday EGD / colonoscopy  Reticulocyte count came back 41,100 (0 88 retic count %) - normal     She denies a SHAWNA, odynophagia, unintentional weight loss, melena or hematochezia  Possibly secondary to bleed versus inadequate dietary iron intake  GI consulted  Plan:  · EGD and colonoscopy tomorrow  · NPO  Bowel prep at 1400 today  · Continue trending CBC  · Patient started on Venofer 300 mg IV for 5 days  · Follow-up occult blood stool - pending - patient constipated, given miralax  · If hemoglobin is below 7, transfuse patient  · Follow up reticulocyte count  · GI consulted, will assess anemia cause, patient has history of Schreiber's esophagitis, chronic iron deficiency anemia, and has refused colonoscopy in the past     · Nutrition was consulted to discuss with patient  Nutritional, healthy foods rich in iron

## 2020-09-23 NOTE — ASSESSMENT & PLAN NOTE
Patient has history anxiety      Plan:  · Continue paroxetine 30 mg daily  · Continue Ativan 2 mg q8h

## 2020-09-23 NOTE — ASSESSMENT & PLAN NOTE
Reports episode of acid reflux this morning  History of Schreiber's esophagitis  No history of EGD in chart review  Plan:  ·   Protonix b i d

## 2020-09-23 NOTE — CASE MANAGEMENT
LOS 2 DAYS  RISK OF UNPLANNED READMISSION SCORE 22  30 DAY READMISSION: NO  BUNDLE: NO    Patient lives with mother who is WC bound w/   Patient resides in a 1-story home w/ 1 FAMILIA      [facility]  STR Hx at [facility] (December 2019) and [facility]  HOSP  AND [place] [facility] (January 2020)  Patient had an [facility] stay in August 2018 on a signed 201 related to an intentional OD  No SA identified related to smoking, drinking, or illicit/street drugs during this assessment  PCP: Marielle Adan    [facility] [address], no barriers identified to obtaining Rx from that location  CM reviewed discharge planning process including the following: identifying help at home, patient preference for discharge planning needs, pharmacy preference, and availability of treatment team to discuss questions or concerns patient and/or family may have regarding understanding medications and recognizing signs and symptoms once discharged  CM also encouraged patient to follow up with all recommended appointments after discharge  Patient advised of importance for patient and family to participate in managing patients medical well being  CM name and role reviewed  Discharge Checklist reviewed and CM will continue to monitor for progress toward discharge goals in nursing and provider rounds  At this time, plan is for EGD/colonoscopy tomorrow and to monitor BMP  CM to follow through discharge

## 2020-09-23 NOTE — ASSESSMENT & PLAN NOTE
Continue psychiatric medications    Plan:  · Continue Geodon, Seroquel, Paxil, Ativan q8 p r n  Juanita Bourgeois

## 2020-09-23 NOTE — ASSESSMENT & PLAN NOTE
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

## 2020-09-23 NOTE — PLAN OF CARE
Problem: Potential for Falls  Goal: Patient will remain free of falls  Description: INTERVENTIONS:  - Assess patient frequently for physical needs  -  Identify cognitive and physical deficits and behaviors that affect risk of falls  -  [place] fall precautions as indicated by assessment   - Educate patient/family on patient safety including physical limitations  - Instruct patient to call for assistance with activity based on assessment  - Modify environment to reduce risk of injury  - Consider OT/PT consult to assist with strengthening/mobility  Outcome: Progressing     Problem: HEMATOLOGIC - ADULT  Goal: Maintains hematologic stability  Description: INTERVENTIONS  - Assess for signs and symptoms of bleeding or hemorrhage  - Monitor labs  - Administer supportive blood products/factors as ordered and appropriate  Outcome: Progressing     Problem: Nutrition/Hydration-ADULT  Goal: Nutrient/Hydration intake appropriate for improving, restoring or maintaining nutritional needs  Description: Monitor and assess patient's nutrition/hydration status for malnutrition  Collaborate with interdisciplinary team and initiate plan and interventions as ordered  Monitor patient's weight and dietary intake as ordered or per policy  Utilize nutrition screening tool and intervene as necessary  Determine patient's food preferences and provide high-protein, high-caloric foods as appropriate       INTERVENTIONS:  - Monitor oral intake, urinary output, labs, and treatment plans  - Assess nutrition and hydration status and recommend course of action  - Evaluate amount of meals eaten  - Assist patient with eating if necessary   - Allow adequate time for meals  - Recommend/ encourage appropriate diets, oral nutritional supplements, and vitamin/mineral supplements  - Order, calculate, and assess calorie counts as needed  - Recommend, monitor, and adjust tube feedings and TPN/PPN based on assessed needs  - Assess need for intravenous fluids  - Provide specific nutrition/hydration education as appropriate  - Include patient/family/caregiver in decisions related to nutrition  Outcome: Progressing

## 2020-09-24 ENCOUNTER — ANESTHESIA EVENT (INPATIENT)
Dept: GASTROENTEROLOGY | Facility: HOSPITAL | Age: 60
DRG: 812 | End: 2020-09-24
Payer: COMMERCIAL

## 2020-09-24 ENCOUNTER — ANESTHESIA (INPATIENT)
Dept: GASTROENTEROLOGY | Facility: HOSPITAL | Age: 60
DRG: 812 | End: 2020-09-24
Payer: COMMERCIAL

## 2020-09-24 ENCOUNTER — APPOINTMENT (INPATIENT)
Dept: GASTROENTEROLOGY | Facility: HOSPITAL | Age: 60
DRG: 812 | End: 2020-09-24
Payer: COMMERCIAL

## 2020-09-24 VITALS
WEIGHT: 167.6 LBS | SYSTOLIC BLOOD PRESSURE: 125 MMHG | TEMPERATURE: 97.7 F | HEART RATE: 78 BPM | BODY MASS INDEX: 29.7 KG/M2 | OXYGEN SATURATION: 96 % | RESPIRATION RATE: 18 BRPM | HEIGHT: 63 IN | DIASTOLIC BLOOD PRESSURE: 75 MMHG

## 2020-09-24 VITALS — HEART RATE: 82 BPM

## 2020-09-24 PROBLEM — R21 SKIN RASH: Status: RESOLVED | Noted: 2020-09-22 | Resolved: 2020-09-24

## 2020-09-24 LAB
ANION GAP SERPL CALCULATED.3IONS-SCNC: 7 MMOL/L (ref 4–13)
BASOPHILS # BLD AUTO: 0.04 THOUSANDS/ΜL (ref 0–0.1)
BASOPHILS NFR BLD AUTO: 1 % (ref 0–1)
BUN SERPL-MCNC: 6 MG/DL (ref 5–25)
CALCIUM SERPL-MCNC: 8.4 MG/DL (ref 8.3–10.1)
CHLORIDE SERPL-SCNC: 103 MMOL/L (ref 100–108)
CO2 SERPL-SCNC: 29 MMOL/L (ref 21–32)
CREAT SERPL-MCNC: 0.67 MG/DL (ref 0.6–1.3)
EOSINOPHIL # BLD AUTO: 0.15 THOUSAND/ΜL (ref 0–0.61)
EOSINOPHIL NFR BLD AUTO: 4 % (ref 0–6)
ERYTHROCYTE [DISTWIDTH] IN BLOOD BY AUTOMATED COUNT: 30.9 % (ref 11.6–15.1)
GFR SERPL CREATININE-BSD FRML MDRD: 96 ML/MIN/1.73SQ M
GLUCOSE SERPL-MCNC: 81 MG/DL (ref 65–140)
HCT VFR BLD AUTO: 29.6 % (ref 34.8–46.1)
HGB BLD-MCNC: 8 G/DL (ref 11.5–15.4)
IMM GRANULOCYTES # BLD AUTO: 0.01 THOUSAND/UL (ref 0–0.2)
IMM GRANULOCYTES NFR BLD AUTO: 0 % (ref 0–2)
LYMPHOCYTES # BLD AUTO: 0.69 THOUSANDS/ΜL (ref 0.6–4.47)
LYMPHOCYTES NFR BLD AUTO: 19 % (ref 14–44)
MCH RBC QN AUTO: 18.7 PG (ref 26.8–34.3)
MCHC RBC AUTO-ENTMCNC: 27 G/DL (ref 31.4–37.4)
MCV RBC AUTO: 69 FL (ref 82–98)
MONOCYTES # BLD AUTO: 0.5 THOUSAND/ΜL (ref 0.17–1.22)
MONOCYTES NFR BLD AUTO: 14 % (ref 4–12)
NEUTROPHILS # BLD AUTO: 2.18 THOUSANDS/ΜL (ref 1.85–7.62)
NEUTS SEG NFR BLD AUTO: 62 % (ref 43–75)
NRBC BLD AUTO-RTO: 1 /100 WBCS
PLATELET # BLD AUTO: 227 THOUSANDS/UL (ref 149–390)
PMV BLD AUTO: 8.5 FL (ref 8.9–12.7)
POTASSIUM SERPL-SCNC: 3.9 MMOL/L (ref 3.5–5.3)
RBC # BLD AUTO: 4.27 MILLION/UL (ref 3.81–5.12)
SODIUM SERPL-SCNC: 139 MMOL/L (ref 136–145)
WBC # BLD AUTO: 3.57 THOUSAND/UL (ref 4.31–10.16)

## 2020-09-24 PROCEDURE — 99239 HOSP IP/OBS DSCHRG MGMT >30: CPT | Performed by: INTERNAL MEDICINE

## 2020-09-24 PROCEDURE — 80048 BASIC METABOLIC PNL TOTAL CA: CPT | Performed by: PSYCHIATRY & NEUROLOGY

## 2020-09-24 PROCEDURE — 0DBL8ZZ EXCISION OF TRANSVERSE COLON, VIA NATURAL OR ARTIFICIAL OPENING ENDOSCOPIC: ICD-10-PCS | Performed by: INTERNAL MEDICINE

## 2020-09-24 PROCEDURE — NC001 PR NO CHARGE: Performed by: INTERNAL MEDICINE

## 2020-09-24 PROCEDURE — 43239 EGD BIOPSY SINGLE/MULTIPLE: CPT | Performed by: INTERNAL MEDICINE

## 2020-09-24 PROCEDURE — 88305 TISSUE EXAM BY PATHOLOGIST: CPT | Performed by: PATHOLOGY

## 2020-09-24 PROCEDURE — 0DB98ZX EXCISION OF DUODENUM, VIA NATURAL OR ARTIFICIAL OPENING ENDOSCOPIC, DIAGNOSTIC: ICD-10-PCS | Performed by: INTERNAL MEDICINE

## 2020-09-24 PROCEDURE — 85025 COMPLETE CBC W/AUTO DIFF WBC: CPT | Performed by: PSYCHIATRY & NEUROLOGY

## 2020-09-24 PROCEDURE — 45385 COLONOSCOPY W/LESION REMOVAL: CPT | Performed by: INTERNAL MEDICINE

## 2020-09-24 PROCEDURE — 99232 SBSQ HOSP IP/OBS MODERATE 35: CPT | Performed by: INTERNAL MEDICINE

## 2020-09-24 RX ORDER — SUCRALFATE 1 G/1
1 TABLET ORAL EVERY 6 HOURS SCHEDULED
Qty: 120 TABLET | Refills: 0 | Status: SHIPPED | OUTPATIENT
Start: 2020-09-24 | End: 2020-11-23

## 2020-09-24 RX ORDER — PROPOFOL 10 MG/ML
INJECTION, EMULSION INTRAVENOUS AS NEEDED
Status: DISCONTINUED | OUTPATIENT
Start: 2020-09-24 | End: 2020-09-24

## 2020-09-24 RX ORDER — SODIUM CHLORIDE, SODIUM LACTATE, POTASSIUM CHLORIDE, CALCIUM CHLORIDE 600; 310; 30; 20 MG/100ML; MG/100ML; MG/100ML; MG/100ML
INJECTION, SOLUTION INTRAVENOUS CONTINUOUS PRN
Status: DISCONTINUED | OUTPATIENT
Start: 2020-09-24 | End: 2020-09-24

## 2020-09-24 RX ORDER — FERROUS SULFATE TAB EC 324 MG (65 MG FE EQUIVALENT) 324 (65 FE) MG
324 TABLET DELAYED RESPONSE ORAL
Qty: 30 TABLET | Refills: 0 | Status: SHIPPED | OUTPATIENT
Start: 2020-09-24 | End: 2020-11-23 | Stop reason: SDUPTHER

## 2020-09-24 RX ORDER — PANTOPRAZOLE SODIUM 40 MG/1
40 TABLET, DELAYED RELEASE ORAL
Qty: 60 TABLET | Refills: 0 | Status: SHIPPED | OUTPATIENT
Start: 2020-09-25 | End: 2021-01-08

## 2020-09-24 RX ORDER — LIDOCAINE HYDROCHLORIDE 10 MG/ML
INJECTION, SOLUTION EPIDURAL; INFILTRATION; INTRACAUDAL; PERINEURAL AS NEEDED
Status: DISCONTINUED | OUTPATIENT
Start: 2020-09-24 | End: 2020-09-24

## 2020-09-24 RX ORDER — PROPOFOL 10 MG/ML
INJECTION, EMULSION INTRAVENOUS CONTINUOUS PRN
Status: DISCONTINUED | OUTPATIENT
Start: 2020-09-24 | End: 2020-09-24

## 2020-09-24 RX ADMIN — PROPOFOL 20 MG: 10 INJECTION, EMULSION INTRAVENOUS at 13:00

## 2020-09-24 RX ADMIN — IRON SUCROSE 300 MG: 20 INJECTION, SOLUTION INTRAVENOUS at 15:07

## 2020-09-24 RX ADMIN — SODIUM CHLORIDE, SODIUM LACTATE, POTASSIUM CHLORIDE, AND CALCIUM CHLORIDE: .6; .31; .03; .02 INJECTION, SOLUTION INTRAVENOUS at 12:47

## 2020-09-24 RX ADMIN — ZIPRASIDONE HYDROCHLORIDE 80 MG: 20 CAPSULE ORAL at 09:08

## 2020-09-24 RX ADMIN — LIDOCAINE HYDROCHLORIDE 50 MG: 10 INJECTION, SOLUTION EPIDURAL; INFILTRATION; INTRACAUDAL; PERINEURAL at 12:57

## 2020-09-24 RX ADMIN — SUCRALFATE 1 G: 1 TABLET ORAL at 15:07

## 2020-09-24 RX ADMIN — PROPOFOL 100 MG: 10 INJECTION, EMULSION INTRAVENOUS at 12:57

## 2020-09-24 RX ADMIN — PANTOPRAZOLE SODIUM 40 MG: 40 TABLET, DELAYED RELEASE ORAL at 15:07

## 2020-09-24 RX ADMIN — PROPOFOL 20 MG: 10 INJECTION, EMULSION INTRAVENOUS at 13:02

## 2020-09-24 RX ADMIN — PROPOFOL 90 MCG/KG/MIN: 10 INJECTION, EMULSION INTRAVENOUS at 13:04

## 2020-09-24 RX ADMIN — PANTOPRAZOLE SODIUM 40 MG: 40 TABLET, DELAYED RELEASE ORAL at 06:00

## 2020-09-24 RX ADMIN — LEVOTHYROXINE SODIUM 25 MCG: 25 TABLET ORAL at 09:08

## 2020-09-24 RX ADMIN — PAROXETINE HYDROCHLORIDE 30 MG: 20 TABLET, FILM COATED ORAL at 09:08

## 2020-09-24 RX ADMIN — SUCRALFATE 1 G: 1 TABLET ORAL at 06:00

## 2020-09-24 NOTE — ASSESSMENT & PLAN NOTE
Stable  Patient with history of iron deficiency anemia  Suspect underlying peptic ulcer disease versus malignancy versus AVM versus celiac sprue  Presents with symptomatic iron deficiency anemia (dizziness/lightheadedness)  Hemoglobin 5 6 on admission  Patient received type and cross, planned for transfuse with 2 units of packed red blood cells  Iron panel  - iron 14 (L), ferretin 6(L), iron sat 3, TIBC 531 (H)  B12, folate no deficit  Reticulocyte count 41,100 (0 88 retic count %) - normal  No prior EGD  Cannot find prior colonoscopy in chart (last was 6yrs ago)  GI was consulted for history of Schreiber's esophagitis, chronic iron deficiency anemia, and be unable to make it to colonoscopies in the past   She denies a SHAWNA, odynophagia, unintentional weight loss, melena or hematochezia  Possibly secondary to bleed versus inadequate dietary iron intake    Today, Hg 8 0 (from 7 9, 8 2, 8 4, 5 6)  Fecal occult blood - positive  NPO today for EGD / colonoscopy  EGD: Normal duodenum, random biopsies taken  Mild gastritis  Medium-sized hiatal hernia with no obvious Geo's erosions  Severe grade D erosive esophagitis, extending through the lower half of the esophagus  Recommend B i d  protonix therapy, Carafate 4 times daily    Colonoscopy: 5 mm transverse colon polyp removed by cold snare, the polyp unfortunately was not retrieved  Redundant colon  No source of anemia anemia likely secondary to severe esophagitis seen on upper endoscopy  Repeat in 5 years due to a personal history of colon polyps    Plan:  · EGD and colonoscopy plan for today at 1230  · Stool occult blood - positive  · We started patient on Protonix 40mg twice a day and carafate 4 times a day  We recommend she start ferrous sulfate 325mg daily as well  No additional changes to medications  · NPO at midnight  Bowel prep given  · Continue trending CBC/BMP    Hg stable last 8 0, BUN 6     · Continue IV Venofer 300 mg IV for 5 days (day 3)  · If hemoglobin is below 7, transfuse patient  · Continue to monitor hemoglobin and transfuse if <7 of with hemodynamic instability     · Nutrition was consulted to discuss with patient  Nutritional, healthy foods rich in iron

## 2020-09-24 NOTE — ANESTHESIA POSTPROCEDURE EVALUATION
Post-Op Assessment Note    CV Status:  Stable    Pain management: adequate     Mental Status:  Alert and awake   Hydration Status:  Euvolemic   PONV Controlled:  Controlled   Airway Patency:  Patent      Post Op Vitals Reviewed: Yes      Staff: CRNA, Anesthesiologist         No complications documented      BP   120/78   Temp  97   Pulse  78   Resp   18   SpO2   99

## 2020-09-24 NOTE — ASSESSMENT & PLAN NOTE
Continue psychiatric medications    Plan:  · Continue Geodon, Seroquel, Paxil, Ativan q8 p r n  Miguel Angel Po

## 2020-09-24 NOTE — ASSESSMENT & PLAN NOTE
Stable  Patient with history of iron deficiency anemia  Suspect underlying peptic ulcer disease versus malignancy versus AVM versus celiac sprue  Presents with symptomatic iron deficiency anemia (dizziness/lightheadedness)  Hemoglobin 5 6 on admission  Patient received type and cross, planned for transfuse with 2 units of packed red blood cells  Iron panel  - iron 14 (L), ferretin 6(L), iron sat 3, TIBC 531 (H)  B12, folate no deficit  Reticulocyte count 41,100 (0 88 retic count %) - normal  No prior EGD  Cannot find prior colonoscopy in chart (last was 6yrs ago)  GI was consulted for history of Schreiber's esophagitis, chronic iron deficiency anemia, and be unable to make it to colonoscopies in the past   She denies a SHAWNA, odynophagia, unintentional weight loss, melena or hematochezia  Possibly secondary to bleed versus inadequate dietary iron intake    Today, Hg 8 0 (from 7 9, 8 2, 8 4, 5 6)  Fecal occult blood - positive  NPO today for EGD / colonoscopy  Plan:  · EGD and colonoscopy plan for today at 1230  · Stool occult blood - positive  · NPO at midnight  Bowel prep given  · Continue trending CBC/BMP  Hg stable last 8 0, BUN 6     · Continue IV Venofer 300 mg IV for 5 days (day 3)  · If hemoglobin is below 7, transfuse patient  · Continue to monitor hemoglobin and transfuse if <7 of with hemodynamic instability     · Nutrition was consulted to discuss with patient  Nutritional, healthy foods rich in iron

## 2020-09-24 NOTE — DISCHARGE SUMMARY
Discharge- J Luis Nelson 1960, 61 y o  female MRN: [id number]    Unit/Bed#: W -01 Encounter: [id number]    Primary Care Provider: Rik Cordon MD   Date and time admitted to hospital: 9/21/2020 10:25 AM        * Symptomatic anemia  Assessment & Plan  Stable  Patient with history of iron deficiency anemia  Suspect underlying peptic ulcer disease versus malignancy versus AVM versus celiac sprue  Presents with symptomatic iron deficiency anemia (dizziness/lightheadedness)  Hemoglobin 5 6 on admission  Patient received type and cross, planned for transfuse with 2 units of packed red blood cells  Iron panel  - iron 14 (L), ferretin 6(L), iron sat 3, TIBC 531 (H)  B12, folate no deficit  Reticulocyte count 41,100 (0 88 retic count %) - normal  No prior EGD  Cannot find prior colonoscopy in chart (last was 6yrs ago)  GI was consulted for history of Schreiber's esophagitis, chronic iron deficiency anemia, and be unable to make it to colonoscopies in the past   She denies a SHAWNA, odynophagia, unintentional weight loss, melena or hematochezia  Possibly secondary to bleed versus inadequate dietary iron intake    Today, Hg 8 0 (from 7 9, 8 2, 8 4, 5 6)  Fecal occult blood - positive  NPO today for EGD / colonoscopy  Plan:  · EGD and colonoscopy plan for today at 1230  · Stool occult blood - positive  · NPO at midnight  Bowel prep given  · Continue trending CBC/BMP  Hg stable last 8 0, BUN 6     · Continue IV Venofer 300 mg IV for 5 days (day 3)  · If hemoglobin is below 7, transfuse patient  · Continue to monitor hemoglobin and transfuse if <7 of with hemodynamic instability     · Nutrition was consulted to discuss with patient  Nutritional, healthy foods rich in iron  Multiple excoriations  Assessment & Plan  Patient presents with skin rash on legs, chest, back that she reports as very itchy  She has HD all these locations to the point of scabbing/excoriations    Advised to stop scratching, as it may be source of infection  Patient reports that she has many fleas in her home, could be related to fleas  Took photos for epic media, ask for dermatology opinion  Per Dermatology, photos of chest and legs show 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, linear excoriations    Plan:  · Continue Lac-Hydrin lotion  · Continue mupirocin for wound care  · Continue Eucerin cream for mositurizing wounds  · Continue benadryl  · She has appointment with a dermatologist, or can make an appointment with phone number on after visit summary  Schizoaffective disorder, depressive type ([facility] [address] )  Assessment & Plan  Continue psychiatric medications    Plan:  · Continue Geodon, Seroquel, Paxil, Ativan q8 p r n  Hao Ingles GERD (gastroesophageal reflux disease)  Assessment & Plan  Reports episode of acid reflux this morning  History of Schreiber's esophagitis  No history of EGD in chart review  Plan:  ·   Protonix b i d     ABIMAEL (generalized anxiety disorder)  Assessment & Plan  Patient has history anxiety  Plan:  · Continue paroxetine 30 mg daily  · Continue Ativan 2 mg q8h    Acquired hypothyroidism  Assessment & Plan  Patient with history of hypothyroidism  Stable  Plan:  · Continue levothyroxine 20 mcg          Discharging Resident Physician: Dorlene Kawasaki, MD  Attending: No att  providers found  PCP: Wilner Jimenez MD  Admission Date: 9/21/2020  Discharge Date: 09/24/20    Disposition:     Home    Reason for Admission: fatigue, dizziness    Consultations During Hospital Stay:  · GI, case management    Procedures Performed:     · None    Significant Findings / Test Results:     · FOBT +  · EGD: Normal duodenum, random biopsies taken  · Mild gastritis  Medium-sized hiatal hernia with no obvious Geo's erosions  Severe grade D erosive esophagitis, extending through the lower half of the esophagus   Recommend B i d  protonix therapy, Carafate 4 times daily  · Colonoscopy: 5 mm transverse colon polyp removed by cold snare, the polyp unfortunately was not retrieved  Redundant colon  No source of anemia anemia likely secondary to severe esophagitis seen on upper endoscopy  Repeat in 5 years due to a personal history of colon polyps    Incidental Findings:   · None  Test Results Pending at Discharge (will require follow up): · None  Outpatient Tests Requested:  · None  Complications:  None  Hospital Course:     60 y/o female came in due to dizziness and being fatigued  PMH of iron deficiency anemia, hypothyroidism, HLD, GERD, anxiety, recurrent vertigo  She states the dizziness had been ongoing for the last few days but was worse morning of admission  She was diagnosed with vertigo by ENT doctor few weeks ago and was prescribed with meclizine  In the emergency room, patient was found to have significant anemia (hg 5 6)   VSS  EKG nl  Na 133, K 3 2  Two units of packed RBCs were ordered to be transfused in the emergency room  Patient completely denies any bleeding  Patient denies any blood in the stools or any vomiting with blood  Patient denies any vaginal bleeding or any blood in the urine  Patient also denies any trauma to suggest any bleeding in the hips  Denies any malabsorption syndrome/problems such as celiac disease  Possible, that this is nutritional as patient has poor nutrition and poor choice of food  Patient told us, that she has history of iron deficiency anemia, but not maintained on any iron supplementation  Review of patient's previous CBCs revealed that patient indeed has anemia, but not this severe  MCV is low (MCV 60, hct 22 9%)  , indicating microcytic anemia  Following infusion, 5 6 to 8 4 to 8 2 this morning  hct 28 6  This AM, iron 14 (L), ferretin 6(L), iron sat 3, TIBC 531 (H) consistent with iron deficiency anemia  Will add ferrous sulfate  We started patient on Protonix 40mg twice a day and carafate 4 times a day    We recommend she start ferrous sulfate 325mg daily as well  No additional changes to medications  We asked patient follow up with PCP, get a CBC, then follow up with GI doctor  Condition at Discharge: good     Discharge Day Visit / Exam:     Subjective:  Patient reported to better today  Denies complaints overnight  No fatigue, chest pain, SOB, nausea, vomiting, dizziness,       Vitals: Blood Pressure: 125/75 (09/24/20 1540)  Pulse: 78 (09/24/20 1400)  Temperature: 97 7 °F (36 5 °C) (09/24/20 1540)  Temp Source: Axillary (09/24/20 1540)  Respirations: 18 (09/24/20 1540)  Height: 5' 3" (160 cm) (09/21/20 2037)  Weight - Scale: 76 kg (167 lb 9 6 oz) (09/21/20 1619)  SpO2: 96 % (09/24/20 1400)  Exam:   Physical Exam  Vitals signs and nursing note reviewed  Constitutional:       General: She is not in acute distress  Appearance: Normal appearance  She is well-developed  She is not diaphoretic  HENT:      Head: Normocephalic and atraumatic  Eyes:      General: No scleral icterus  Right eye: No discharge  Left eye: No discharge  Conjunctiva/sclera: Conjunctivae normal    Cardiovascular:      Rate and Rhythm: Normal rate and regular rhythm  Pulmonary:      Effort: Pulmonary effort is normal  No respiratory distress  Abdominal:      General: Abdomen is flat  Bowel sounds are normal  There is no distension  Palpations: Abdomen is soft  Tenderness: There is no abdominal tenderness  There is no guarding  Musculoskeletal:         General: No swelling or tenderness  Skin:     General: Skin is warm and dry  Comments: excoriations c/w yesteday   Neurological:      General: No focal deficit present  Mental Status: She is alert and oriented to person, place, and time  Psychiatric:         Behavior: Behavior normal          Thought Content: Thought content normal          Judgment: Judgment normal          Discussion with Family: With patient      Discharge instructions/Information to patient and family:   See after visit summary for information provided to patient and family  Provisions for Follow-Up Care:  See after visit summary for information related to follow-up care and any pertinent home health orders  Planned Readmission: None     Discharge Medications:  See after visit summary for reconciled discharge medications provided to patient and family        ** Please Note: This note has been constructed using a voice recognition system **

## 2020-09-24 NOTE — ASSESSMENT & PLAN NOTE
Continue psychiatric medications    Plan:  · Continue Geodon, Seroquel, Paxil, Ativan q8 p r n  Patrick Manifold

## 2020-09-24 NOTE — DISCHARGE INSTRUCTIONS
Schreiber Esophagus   WHAT YOU NEED TO KNOW:   Schreiber esophagus is a condition in which the cells that line your esophagus are damaged  The damage can cause abnormal changes in the cells  These abnormal changes increase your risk of esophageal cancer  DISCHARGE INSTRUCTIONS:   Medicines:   · Anti-reflux medicines  may be needed to help decrease the stomach acid that can irritate your esophagus and stomach  These medicines may include proton pump inhibitors (PPI) and histamine type-2 receptor (H2) blockers  You may also be given medicines to stop vomiting  · Take your medicine as directed  Contact your healthcare provider if you think your medicine is not helping or if you have side effects  Tell him if you are allergic to any medicine  Keep a list of the medicines, vitamins, and herbs you take  Include the amounts, and when and why you take them  Bring the list or the pill bottles to follow-up visits  Carry your medicine list with you in case of an emergency  Follow up with your healthcare provider as directed: Your healthcare provider may need to repeat your endoscopy and biopsy  These tests help look for early signs of esophageal cancer  Write down your questions so you remember to ask them during your visits  Nutrition:  Do not eat foods that make your symptoms worse  Examples are chocolate, garlic, onions, spicy or fatty foods, citrus fruits (oranges), and tomato-based foods (spaghetti sauce)  Do not drink alcohol, drinks that contain caffeine, or carbonated drinks, such as soda  Ask your healthcare provider if there are other foods and drinks you should not have  Maintain a healthy weight: If you are overweight, weight loss may help relieve symptoms  Ask your healthcare provider about safe ways to lose weight  Do not smoke: If you smoke, it is never too late to quit  Smoking may worsen acid reflux  Ask your healthcare provider for information if you need help quitting     Contact your healthcare provider if:   · Your symptoms do not improve with treatment  · You have questions or concerns about your condition or care  Seek care immediately or call 911 if:   · You have severe chest pain and shortness of breath  · Your bowel movements are black, bloody, or tarry  · Your vomit looks like coffee grounds or has blood in it  © 2017 [employer] Information is for End User's use only and may not be sold, redistributed or otherwise used for commercial purposes  All illustrations and images included in CareNotes® are the copyrighted property of [employer]  or Stef Orellana  The above information is an  only  It is not intended as medical advice for individual conditions or treatments  Talk to your doctor, nurse or pharmacist before following any medical regimen to see if it is safe and effective for you  Anemia   WHAT YOU NEED TO KNOW:   Anemia is a low number of red blood cells or a low amount of hemoglobin in your red blood cells  Hemoglobin is a protein that helps carry oxygen throughout your body  Red blood cells use iron to create hemoglobin  Anemia may develop if your body does not have enough iron  It may also develop if your body does not make enough red blood cells or they die faster than your body can make them  DISCHARGE INSTRUCTIONS:   Call 911 or have someone call 911 for any of the following:   · You lose consciousness  · You have severe chest pain  Seek care immediately if:   · You have dark or bloody bowel movements  Contact your healthcare provider if:   · Your symptoms are worse, even after treatment  · You have questions or concerns about your condition or care  Medicines:   · Iron or folic acid supplements  help increase your red blood cell and hemoglobin levels  · Vitamin B12 injections  may help boost your red blood cell level and decrease your symptoms   Ask your healthcare provider how to inject B12     · Take your medicine as directed  Contact your healthcare provider if you think your medicine is not helping or if you have side effects  Tell him of her if you are allergic to any medicine  Keep a list of the medicines, vitamins, and herbs you take  Include the amounts, and when and why you take them  Bring the list or the pill bottles to follow-up visits  Carry your medicine list with you in case of an emergency  Prevent anemia:  Eat healthy foods rich in iron and vitamin C  Nuts, meat, dark leafy green vegetables, and beans are high in iron and protein  Vitamin C helps your body absorb iron  Foods rich in vitamin C include oranges and other citrus fruits  Ask your healthcare provider for a list of other foods that are high in iron or vitamin C  Ask if you need to be on a special diet  Follow up with your healthcare provider as directed:  Write down your questions so you remember to ask them during your visits  © 2017 [employer] Information is for End User's use only and may not be sold, redistributed or otherwise used for commercial purposes  All illustrations and images included in CareNotes® are the copyrighted property of [employer]  or [employer]  The above information is an  only  It is not intended as medical advice for individual conditions or treatments  Talk to your doctor, nurse or pharmacist before following any medical regimen to see if it is safe and effective for you

## 2020-09-24 NOTE — CASE MANAGEMENT
CM notified of paitent's d/c and ability to get ride home  YASMIN s/w patient at [facility]  LYFT waiver signed by patient  YASMIN s/w Raymundo Leavitt at [facility] and requested 6pm LY  as patient would like to eat dinner prior to d/c  Nurse Don Moore aware and will be contacted when ride is on the way

## 2020-09-24 NOTE — PROGRESS NOTES
Progress Note - Lori Velarde 1960, 61 y o  female MRN: [id number]    Unit/Bed#: W -01 Encounter: [id number]    Primary Care Provider: Deonna Levin MD   Date and time admitted to hospital: 9/21/2020 10:25 AM        * Symptomatic anemia  Assessment & Plan  Stable  Patient with history of iron deficiency anemia  Suspect underlying peptic ulcer disease versus malignancy versus AVM versus celiac sprue  Presents with symptomatic iron deficiency anemia (dizziness/lightheadedness)  Hemoglobin 5 6 on admission  Patient received type and cross, planned for transfuse with 2 units of packed red blood cells  Iron panel  - iron 14 (L), ferretin 6(L), iron sat 3, TIBC 531 (H)  B12, folate no deficit  Reticulocyte count 41,100 (0 88 retic count %) - normal  No prior EGD  Cannot find prior colonoscopy in chart (last was 6yrs ago)  GI was consulted for history of Schreiber's esophagitis, chronic iron deficiency anemia, and be unable to make it to colonoscopies in the past   She denies a SHAWNA, odynophagia, unintentional weight loss, melena or hematochezia  Possibly secondary to bleed versus inadequate dietary iron intake    Today, Hg 8 0 (from 7 9, 8 2, 8 4, 5 6)  Fecal occult blood - positive  NPO today for EGD / colonoscopy  Plan:  · EGD and colonoscopy plan for today at 1230  · Stool occult blood - positive  · NPO at midnight  Bowel prep given  · Continue trending CBC/BMP  Hg stable last 8 0, BUN 6     · Continue IV Venofer 300 mg IV for 5 days (day 3)  · If hemoglobin is below 7, transfuse patient  · Continue to monitor hemoglobin and transfuse if <7 of with hemodynamic instability     · Nutrition was consulted to discuss with patient  Nutritional, healthy foods rich in iron  Multiple excoriations  Assessment & Plan  Patient presents with skin rash on legs, chest, back that she reports as very itchy  She has HD all these locations to the point of scabbing/excoriations    Advised to stop scratching, as it may be source of infection  Patient reports that she has many fleas in her home, could be related to fleas  Took photos for epic media, ask for dermatology opinion  Per Dermatology, photos of chest and legs show 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, linear excoriations    Plan:  · Continue Lac-Hydrin lotion  · Continue mupirocin for wound care  · Continue Eucerin cream for mositurizing wounds  · Continue benadryl  · She has appointment with a dermatologist, or can make an appointment with phone number on after visit summary  Schizoaffective disorder, depressive type ([facility] [address] )  Assessment & Plan  Continue psychiatric medications    Plan:  · Continue Geodon, Seroquel, Paxil, Ativan q8 p r n  [place] GERD (gastroesophageal reflux disease)  Assessment & Plan  Reports episode of acid reflux this morning  History of Schreiber's esophagitis  No history of EGD in chart review  Plan:  ·   Protonix b i d     ABIMAEL (generalized anxiety disorder)  Assessment & Plan  Patient has history anxiety  Plan:  · Continue paroxetine 30 mg daily  · Continue Ativan 2 mg q8h    Acquired hypothyroidism  Assessment & Plan  Patient with history of hypothyroidism  Stable  Plan:  · Continue levothyroxine 20 mcg        VTE Pharmacologic Prophylaxis:   Pharmacologic: Pharmacologic VTE Prophylaxis contraindicated due to GI bleed  Mechanical VTE Prophylaxis in Place: Yes    Discussions with Specialists or Other Care Team Provider:  GI, case management, a nursing  Education and Discussions with Family / Patient:  Spoke with patient at bedside  Current Length of Stay: 3 day(s)    Current Patient Status: Inpatient     Discharge Plan / Estimated Discharge Date:  12-36 hours    Code Status: Level 1 - Full Code      Subjective:   Patient reports doing well overnight  No acute events  No episodes of GERD  No rey blood in stool or urine, patient understands that fecal occult blood testing was positive    She understands the plan for colonoscopy  No lightheadedness or syncope  No nausea, vomiting  Denies chest pain, shortness of breath, abdominal pain  Review of systems otherwise negative  Patient reports that her skin excoriations are no longer itchy, feels that they are improved, decreasing size  Objective:     Vitals:   Temp (24hrs), Av 7 °F (37 1 °C), Min:98 3 °F (36 8 °C), Max:99 1 °F (37 3 °C)    Temp:  [98 3 °F (36 8 °C)-99 1 °F (37 3 °C)] 99 1 °F (37 3 °C)  HR:  [64-83] 78  Resp:  [18] 18  BP: (103-135)/(54-75) 135/60  SpO2:  [94 %-99 %] 98 %  Body mass index is 29 69 kg/m²  Input and Output Summary (last 24 hours): Intake/Output Summary (Last 24 hours) at 2020 1114  Last data filed at 2020 0844  Gross per 24 hour   Intake 480 ml   Output 1700 ml   Net -1220 ml       Physical Exam:     Physical Exam  Vitals signs and nursing note reviewed  Constitutional:       General: She is not in acute distress  Appearance: Normal appearance  She is well-developed  She is not diaphoretic  HENT:      Head: Normocephalic and atraumatic  Eyes:      General: No scleral icterus  Right eye: No discharge  Left eye: No discharge  Conjunctiva/sclera: Conjunctivae normal    Cardiovascular:      Rate and Rhythm: Normal rate and regular rhythm  Pulmonary:      Effort: Pulmonary effort is normal  No respiratory distress  Abdominal:      General: Abdomen is flat  Bowel sounds are normal  There is no distension  Palpations: Abdomen is soft  Tenderness: There is no abdominal tenderness  Skin:     General: Skin is warm and dry  Comments: Skin excoriations on chest, legs, back consistent with yesterday  Not itchy any more  Neurological:      Mental Status: She is alert and oriented to person, place, and time  Psychiatric:         Behavior: Behavior normal          Thought Content:  Thought content normal          Judgment: Judgment normal        Additional Data: Labs:    Results from last 7 days   Lab Units 09/24/20  0636   WBC Thousand/uL 3 57*   HEMOGLOBIN g/dL 8 0*   HEMATOCRIT % 29 6*   PLATELETS Thousands/uL 227   NEUTROS PCT % 62   LYMPHS PCT % 19   MONOS PCT % 14*   EOS PCT % 4     Results from last 7 days   Lab Units 09/24/20  0636  09/21/20  1116   POTASSIUM mmol/L 3 9   < > 3 2*   CHLORIDE mmol/L 103   < > 99*   CO2 mmol/L 29   < > 27   BUN mg/dL 6   < > 7   CREATININE mg/dL 0 67   < > 0 66   CALCIUM mg/dL 8 4   < > 8 2*   ALK PHOS U/L  --   --  98   ALT U/L  --   --  21   AST U/L  --   --  11    < > = values in this interval not displayed  * I Have Reviewed All Lab Data Listed Above  * Additional Pertinent Lab Tests Reviewed: All Labs For Current Hospital Admission Reviewed    Imaging:    Imaging Reports Reviewed Today Include:  None  Imaging Personally Reviewed by Myself Includes:  None      Recent Cultures (last 7 days):           Last 24 Hours Medication List:   Current Facility-Administered Medications   Medication Dose Route Frequency Provider Last Rate    acetaminophen  650 mg Oral Q6H PRN Rach Kaye MD      ammonium lactate   Topical BID James Duque MD      calcium carbonate  500 mg Oral BID PRN Sarah García DO      diphenhydrAMINE  25 mg Oral Q6H PRN Rach Kaye MD      iron sucrose  300 mg Intravenous Daily Beena Camargo MD      levothyroxine  25 mcg Oral Daily Kendall Pandey MD      LORazepam  2 mg Oral Q8H PRN Kendall Pandey MD      meclizine  25 mg Oral TID PRN Kendall Pandey MD      mupirocin   Topical TID Kendall Pandey MD      ondansetron  4 mg Intravenous Q6H PRN Rach Kaye MD      pantoprazole  40 mg Oral BID AC Richard Mirza PA-C      PARoxetine  30 mg Oral Daily Kendall Pandey MD      QUEtiapine  400 mg Oral HS Kendall Pandey MD      sucralfate  1 g Oral Q6H [facility] Richard Mirza PA-C      white petrolatum-mineral oil   Topical TID PRN Kendall Pandey MD      ziprasidone  80 mg Oral Daily Shwetha Lundberg MD          Today, Patient Was Seen By: Shwetha Lundberg MD    ** Please Note: This note has been constructed using a voice recognition system   **

## 2020-09-24 NOTE — ANESTHESIA PREPROCEDURE EVALUATION
Procedure:  COLONOSCOPY  EGD    Relevant Problems   CARDIO   (+) Essential hypertension      ENDO   (+) Acquired hypothyroidism      GI/HEPATIC   (+) GERD (gastroesophageal reflux disease)      HEMATOLOGY   (+) Iron deficiency anemia secondary to inadequate dietary iron intake   (+) Symptomatic anemia      MUSCULOSKELETAL   (+) Chronic bilateral low back pain without sciatica   (+) DDD (degenerative disc disease), lumbar   (+) Lumbar spondylosis      NEURO/PSYCH   (+) Anxiety   (+) ABIMAEL (generalized anxiety disorder)   (+) Schizoaffective disorder, depressive type (HCC)      Other   (+) Lumbar radiculopathy   (+) Multiple excoriations   (+) Spondylolisthesis at L4-L5 level      Admission Hb 5  Received 2 units PRBC, Hb up to 8 now  Physical Exam    Airway    Mallampati score: II  TM Distance: >3 FB  Neck ROM: full     Dental   No notable dental hx     Cardiovascular      Pulmonary      Other Findings        Anesthesia Plan  ASA Score- 3     Anesthesia Type- IV sedation with anesthesia with ASA Monitors  Additional Monitors:   Airway Plan:           Plan Factors-    Chart reviewed  Existing labs reviewed  Patient is not a current smoker  Induction- intravenous  Postoperative Plan-     Informed Consent- Anesthetic plan and risks discussed with patient  I personally reviewed this patient with the CRNA  Discussed and agreed on the Anesthesia Plan with the CRNA  Nino Hannon

## 2020-09-24 NOTE — DISCHARGE INSTR - AVS FIRST PAGE
Dear Stephon Conn,     It was our pleasure to care for you here at [facility] [place]  It is our hope that we were always able to exceed the expected standards for your care during your stay  You were hospitalized due to fatigue  You were cared for on the internal medicine floor by Tammie Rodriguez MD under the service of Sami Gomez MD with the [facility] who covers for your primary care physician (PCP), Kiel Fair MD, while you were hospitalized  If you have any questions or concerns related to this hospitalization, you may contact us at [phone]  For follow up as well as any medication refills, we recommend that you follow up with your primary care physician  A registered nurse will reach out to you by phone within a few days after your discharge to answer any additional questions that you may have after going home  However, at this time we provide for you here, the most important instructions / recommendations at discharge:     · Notable Medication Adjustments -   · We started you on Protonix twice a day and carafate 4 times a day  We recommend you start ferrous sulfate 325mg daily as well  No additional changes to medications  · Testing Required after Discharge -   · None  · Important follow up information -   · Follow-up with your primary care practitioner for an appointment in 1 week  We recommend you follow-up with her GI doctor, Harlan Mcclain MD    · Other Instructions -   · Follow-up with your primary care provider in 1 week for a repeat CBC  · Please review this entire after visit summary as additional general instructions including medication list, appointments, activity, diet, any pertinent wound care, and other additional recommendations from your care team that may be provided for you        Sincerely,     Tammie Rodriguez MD

## 2020-09-25 ENCOUNTER — OFFICE VISIT (OUTPATIENT)
Dept: URGENT CARE | Age: 60
End: 2020-09-25
Payer: COMMERCIAL

## 2020-09-25 ENCOUNTER — APPOINTMENT (INPATIENT)
Dept: RADIOLOGY | Facility: HOSPITAL | Age: 60
DRG: 868 | End: 2020-09-25
Payer: COMMERCIAL

## 2020-09-25 ENCOUNTER — HOSPITAL ENCOUNTER (INPATIENT)
Facility: HOSPITAL | Age: 60
LOS: 1 days | Discharge: HOME | DRG: 868 | End: 2020-09-28
Attending: EMERGENCY MEDICINE | Admitting: INTERNAL MEDICINE
Payer: COMMERCIAL

## 2020-09-25 ENCOUNTER — TELEPHONE (OUTPATIENT)
Dept: INTERNAL MEDICINE CLINIC | Facility: CLINIC | Age: 60
End: 2020-09-25

## 2020-09-25 ENCOUNTER — HOSPITAL ENCOUNTER (EMERGENCY)
Facility: HOSPITAL | Age: 60
Discharge: LEFT BEFORE DISCHARGE | DRG: 868 | End: 2020-09-25
Attending: EMERGENCY MEDICINE | Admitting: INTERNAL MEDICINE
Payer: COMMERCIAL

## 2020-09-25 ENCOUNTER — TRANSITIONAL CARE MANAGEMENT (OUTPATIENT)
Dept: INTERNAL MEDICINE CLINIC | Facility: CLINIC | Age: 60
End: 2020-09-25

## 2020-09-25 VITALS
BODY MASS INDEX: 29.61 KG/M2 | RESPIRATION RATE: 18 BRPM | HEIGHT: 63 IN | OXYGEN SATURATION: 96 % | DIASTOLIC BLOOD PRESSURE: 67 MMHG | TEMPERATURE: 98.4 F | HEART RATE: 102 BPM | SYSTOLIC BLOOD PRESSURE: 128 MMHG | WEIGHT: 167.11 LBS

## 2020-09-25 VITALS
SYSTOLIC BLOOD PRESSURE: 120 MMHG | DIASTOLIC BLOOD PRESSURE: 59 MMHG | TEMPERATURE: 99.3 F | BODY MASS INDEX: 29.77 KG/M2 | OXYGEN SATURATION: 97 % | RESPIRATION RATE: 18 BRPM | HEIGHT: 63 IN | WEIGHT: 168 LBS | HEART RATE: 99 BPM

## 2020-09-25 DIAGNOSIS — I89.1 LYMPHANGITIS: ICD-10-CM

## 2020-09-25 DIAGNOSIS — L03.90 CELLULITIS: Primary | ICD-10-CM

## 2020-09-25 DIAGNOSIS — L03.114 CELLULITIS OF LEFT UPPER EXTREMITY: Primary | ICD-10-CM

## 2020-09-25 PROBLEM — D50.0 IRON DEFICIENCY ANEMIA DUE TO CHRONIC BLOOD LOSS: Status: ACTIVE | Noted: 2019-11-14

## 2020-09-25 PROBLEM — K22.10 EROSIVE ESOPHAGITIS: Status: ACTIVE | Noted: 2020-09-25

## 2020-09-25 LAB
ALBUMIN SERPL BCP-MCNC: 3.2 G/DL (ref 3.5–5)
ALP SERPL-CCNC: 103 U/L (ref 46–116)
ALT SERPL W P-5'-P-CCNC: 20 U/L (ref 12–78)
ANION GAP SERPL CALCULATED.3IONS-SCNC: 9 MMOL/L (ref 4–13)
AST SERPL W P-5'-P-CCNC: 18 U/L (ref 5–45)
BASOPHILS # BLD AUTO: 0.02 THOUSANDS/ΜL (ref 0–0.1)
BASOPHILS NFR BLD AUTO: 1 % (ref 0–1)
BILIRUB SERPL-MCNC: 0.55 MG/DL (ref 0.2–1)
BUN SERPL-MCNC: 6 MG/DL (ref 5–25)
CALCIUM ALBUM COR SERPL-MCNC: 9 MG/DL (ref 8.3–10.1)
CALCIUM SERPL-MCNC: 8.4 MG/DL (ref 8.3–10.1)
CHLORIDE SERPL-SCNC: 94 MMOL/L (ref 100–108)
CO2 SERPL-SCNC: 27 MMOL/L (ref 21–32)
CREAT SERPL-MCNC: 0.78 MG/DL (ref 0.6–1.3)
EOSINOPHIL # BLD AUTO: 0 THOUSAND/ΜL (ref 0–0.61)
EOSINOPHIL NFR BLD AUTO: 0 % (ref 0–6)
ERYTHROCYTE [DISTWIDTH] IN BLOOD BY AUTOMATED COUNT: 33.5 % (ref 11.6–15.1)
GFR SERPL CREATININE-BSD FRML MDRD: 83 ML/MIN/1.73SQ M
GLUCOSE SERPL-MCNC: 97 MG/DL (ref 65–140)
HCT VFR BLD AUTO: 32 % (ref 34.8–46.1)
HGB BLD-MCNC: 8.9 G/DL (ref 11.5–15.4)
IMM GRANULOCYTES # BLD AUTO: 0.02 THOUSAND/UL (ref 0–0.2)
IMM GRANULOCYTES NFR BLD AUTO: 1 % (ref 0–2)
LYMPHOCYTES # BLD AUTO: 0.42 THOUSANDS/ΜL (ref 0.6–4.47)
LYMPHOCYTES NFR BLD AUTO: 12 % (ref 14–44)
MCH RBC QN AUTO: 19.6 PG (ref 26.8–34.3)
MCHC RBC AUTO-ENTMCNC: 27.8 G/DL (ref 31.4–37.4)
MCV RBC AUTO: 71 FL (ref 82–98)
MONOCYTES # BLD AUTO: 0.34 THOUSAND/ΜL (ref 0.17–1.22)
MONOCYTES NFR BLD AUTO: 9 % (ref 4–12)
NEUTROPHILS # BLD AUTO: 2.85 THOUSANDS/ΜL (ref 1.85–7.62)
NEUTS SEG NFR BLD AUTO: 77 % (ref 43–75)
NRBC BLD AUTO-RTO: 0 /100 WBCS
PLATELET # BLD AUTO: 241 THOUSANDS/UL (ref 149–390)
PMV BLD AUTO: 9.4 FL (ref 8.9–12.7)
POTASSIUM SERPL-SCNC: 3.3 MMOL/L (ref 3.5–5.3)
PROT SERPL-MCNC: 7 G/DL (ref 6.4–8.2)
RBC # BLD AUTO: 4.53 MILLION/UL (ref 3.81–5.12)
SODIUM SERPL-SCNC: 130 MMOL/L (ref 136–145)
WBC # BLD AUTO: 3.65 THOUSAND/UL (ref 4.31–10.16)

## 2020-09-25 PROCEDURE — 96365 THER/PROPH/DIAG IV INF INIT: CPT

## 2020-09-25 PROCEDURE — 87040 BLOOD CULTURE FOR BACTERIA: CPT | Performed by: PHYSICIAN ASSISTANT

## 2020-09-25 PROCEDURE — 36415 COLL VENOUS BLD VENIPUNCTURE: CPT | Performed by: PHYSICIAN ASSISTANT

## 2020-09-25 PROCEDURE — 99223 1ST HOSP IP/OBS HIGH 75: CPT | Performed by: INTERNAL MEDICINE

## 2020-09-25 PROCEDURE — 99203 OFFICE O/P NEW LOW 30 MIN: CPT | Performed by: PHYSICIAN ASSISTANT

## 2020-09-25 PROCEDURE — 80053 COMPREHEN METABOLIC PANEL: CPT | Performed by: PHYSICIAN ASSISTANT

## 2020-09-25 PROCEDURE — 99284 EMERGENCY DEPT VISIT MOD MDM: CPT | Performed by: PHYSICIAN ASSISTANT

## 2020-09-25 PROCEDURE — 73130 X-RAY EXAM OF HAND: CPT

## 2020-09-25 PROCEDURE — 0H9GXZZ DRAINAGE OF LEFT HAND SKIN, EXTERNAL APPROACH: ICD-10-PCS | Performed by: EMERGENCY MEDICINE

## 2020-09-25 PROCEDURE — 85025 COMPLETE CBC W/AUTO DIFF WBC: CPT | Performed by: PHYSICIAN ASSISTANT

## 2020-09-25 PROCEDURE — 10060 I&D ABSCESS SIMPLE/SINGLE: CPT | Performed by: PHYSICIAN ASSISTANT

## 2020-09-25 PROCEDURE — 99284 EMERGENCY DEPT VISIT MOD MDM: CPT

## 2020-09-25 RX ORDER — CEFAZOLIN SODIUM 1 G/50ML
1000 SOLUTION INTRAVENOUS ONCE
Status: COMPLETED | OUTPATIENT
Start: 2020-09-25 | End: 2020-09-25

## 2020-09-25 RX ORDER — MECLIZINE HCL 12.5 MG/1
25 TABLET ORAL 3 TIMES DAILY PRN
Status: CANCELLED | OUTPATIENT
Start: 2020-09-25

## 2020-09-25 RX ORDER — LORAZEPAM 1 MG/1
2 TABLET ORAL EVERY 8 HOURS PRN
Status: CANCELLED | OUTPATIENT
Start: 2020-09-25

## 2020-09-25 RX ORDER — PANTOPRAZOLE SODIUM 40 MG/1
40 TABLET, DELAYED RELEASE ORAL
Status: CANCELLED | OUTPATIENT
Start: 2020-09-26

## 2020-09-25 RX ORDER — SUCRALFATE 1 G/1
1 TABLET ORAL EVERY 6 HOURS SCHEDULED
Status: CANCELLED | OUTPATIENT
Start: 2020-09-25

## 2020-09-25 RX ORDER — ACETAMINOPHEN 325 MG/1
650 TABLET ORAL EVERY 6 HOURS PRN
Status: CANCELLED | OUTPATIENT
Start: 2020-09-25

## 2020-09-25 RX ORDER — FERROUS SULFATE TAB EC 324 MG (65 MG FE EQUIVALENT) 324 (65 FE) MG
324 TABLET DELAYED RESPONSE ORAL
Status: CANCELLED | OUTPATIENT
Start: 2020-09-26

## 2020-09-25 RX ORDER — LEVOTHYROXINE SODIUM 0.03 MG/1
25 TABLET ORAL DAILY
Status: CANCELLED | OUTPATIENT
Start: 2020-09-26

## 2020-09-25 RX ORDER — MULTIVITAMIN
1 CAPSULE ORAL DAILY
Status: CANCELLED | OUTPATIENT
Start: 2020-09-26

## 2020-09-25 RX ORDER — QUETIAPINE FUMARATE 200 MG/1
400 TABLET, FILM COATED ORAL
Status: CANCELLED | OUTPATIENT
Start: 2020-09-25

## 2020-09-25 RX ORDER — CEFAZOLIN SODIUM 1 G/50ML
1000 SOLUTION INTRAVENOUS EVERY 8 HOURS
Status: CANCELLED | OUTPATIENT
Start: 2020-09-25

## 2020-09-25 RX ORDER — LIDOCAINE HYDROCHLORIDE AND EPINEPHRINE 10; 10 MG/ML; UG/ML
1 INJECTION, SOLUTION INFILTRATION; PERINEURAL ONCE
Status: COMPLETED | OUTPATIENT
Start: 2020-09-25 | End: 2020-09-25

## 2020-09-25 RX ORDER — ZIPRASIDONE HYDROCHLORIDE 40 MG/1
80 CAPSULE ORAL DAILY
Status: CANCELLED | OUTPATIENT
Start: 2020-09-26

## 2020-09-25 RX ADMIN — CEFAZOLIN SODIUM 1000 MG: 1 SOLUTION INTRAVENOUS at 18:42

## 2020-09-25 RX ADMIN — LIDOCAINE HYDROCHLORIDE AND EPINEPHRINE 1 ML: 10; 10 INJECTION, SOLUTION INFILTRATION; PERINEURAL at 18:41

## 2020-09-25 NOTE — TELEPHONE ENCOUNTER
The patient was released from the hospital yesterday  She stated her iv was put in lopsided  Because of this Her left hand is swollen, it hurts and puss is coming out of it  As per Gutierrez Patino I was instructed to send her to an urgent care

## 2020-09-25 NOTE — PROGRESS NOTES
[facility]        NAME: J Luis Nelson is a 61 y o  female  : 1960    MRN: [id number]  DATE: 2020  TIME: 4:23 PM    Assessment and Plan   Cellulitis of left upper extremity [L03 114]  1  Cellulitis of left upper extremity  Transfer to other facility         Patient Instructions     Go to ER for further evaluation  Follow up with PCP in 3-5 days  Proceed to  ER if symptoms worsen  Chief Complaint     Chief Complaint   Patient presents with    Hand Pain     Left hand swollen , red, and discharge color white  Patient was discharge from the hospital yesterday  History of Present Illness       Pt was just hospitalized for a week due to anemia  She was discharged yesterday  She states that there was an issue with her IV on her left hand and when they did the iron infusion yesterday the iron leaked into her hand instead of going in the vein  Today her hand is swollen, red, painful, and has white pus draining from the IV site  She denies any fevers, chills, N,V      Review of Systems   Review of Systems   Constitutional: Negative  HENT: Negative  Respiratory: Negative  Cardiovascular: Negative  Gastrointestinal: Negative  Musculoskeletal:        Hand swelling and redness   Skin: Positive for color change  Neurological: Negative  Psychiatric/Behavioral: Negative            Current Medications       Current Outpatient Medications:     ferrous sulfate 324 (65 Fe) mg, Take 1 tablet (324 mg total) by mouth daily before breakfast, Disp: 30 tablet, Rfl: 0    levothyroxine 25 mcg tablet, Take 1 tablet (25 mcg total) by mouth daily, Disp: 90 tablet, Rfl: 3    LORazepam (ATIVAN) 2 mg tablet, Take 1 tablet (2 mg total) by mouth every 8 (eight) hours as needed for anxiety, Disp: 90 tablet, Rfl: 1    meclizine (ANTIVERT) 25 mg tablet, Take 1 tablet (25 mg total) by mouth 3 (three) times a day as needed for dizziness, Disp: 30 tablet, Rfl: 0    Multiple Vitamin (MULTIVITAMIN) capsule, Take 1 capsule by mouth daily, Disp: 30 capsule, Rfl: 0    pantoprazole (PROTONIX) 40 mg tablet, Take 1 tablet (40 mg total) by mouth 2 (two) times a day before meals, Disp: 60 tablet, Rfl: 0    PARoxetine (PAXIL) 30 mg tablet, Take 30 mg by mouth daily, Disp: , Rfl:     QUEtiapine (SEROquel) 400 MG tablet, Take 1 tablet (400 mg total) by mouth daily at bedtime, Disp: 90 tablet, Rfl: 3    sucralfate (CARAFATE) 1 g tablet, Take 1 tablet (1 g total) by mouth every 6 (six) hours, Disp: 120 tablet, Rfl: 0    triamcinolone (KENALOG) 0 1 % cream, Apply 1 application topically 2 (two) times a day To affected area, Disp: 80 g, Rfl: 2    ziprasidone (GEODON) 80 mg capsule, TAKE 1 CAPSULE BY MOUTH EVERY DAY, Disp: 90 capsule, Rfl: 1  No current facility-administered medications for this visit       Current Allergies     Allergies as of 09/25/2020 - Reviewed 09/25/2020   Allergen Reaction Noted    Risperdal [risperidone] Shortness Of Breath 05/13/2016    Zyprexa [olanzapine] Shortness Of Breath 09/25/2016    Latex Rash 05/13/2016            The following portions of the patient's history were reviewed and updated as appropriate: allergies, current medications, past family history, past medical history, past social history, past surgical history and problem list      Past Medical History:   Diagnosis Date    Anxiety     Back pain at L4-L5 level     Schreiber esophagus     Bulimia     Colon polyp     Disease of thyroid gland     hypothyroid    Ear problems     GERD (gastroesophageal reflux disease)     Hyperlipidemia     Hypothyroidism     Leukopenia     Nasal congestion     NMS (neuroleptic malignant syndrome) 01/03/2020    Rheumatoid arthritis involving right hip ([address] )     Sciatica     Seizure ([facility] [address] )     due to medication mix up 8/2018 only one historically    Synovial cyst of lumbar spine     Vertigo     Weight gain        Past Surgical History:   Procedure Laterality Date    BONE MARROW BIOPSY      BREAST SURGERY      enlargement     CLOSED REDUCTION DISTAL FEMUR FRACTURE      COLONOSCOPY      COSMETIC SURGERY      HIP ARTHROPLASTY Right 1/2/2020    Procedure: REVISION TOTAL HIP ARTHROPLASTY WITH REPAIR OF PARIPROSTHETIC FRACTURE - POSTERIOR APPROACH;  Surgeon: Shazia Elias MD;  [facility]: [facility];  Service: Orthopedics    HI TOTAL HIP ARTHROPLASTY Right 12/11/2019    Procedure: ARTHROPLASTY HIP TOTAL ANTERIOR;  Surgeon: Shazia Elias MD;  [facility];  Service: Orthopedics    RHINOPLASTY      SINUS SURGERY         Family History   Problem Relation Age of Onset    Uterine cancer Mother     Esophageal cancer Father     Colon cancer Maternal Grandmother          Medications have been verified  Objective   /59 (BP Location: Right arm, Patient Position: Sitting, Cuff Size: Large)   Pulse 99   Temp 99 3 °F (37 4 °C) (Temporal)   Resp 18   Ht 5' 3" (1 6 m)   Wt 76 2 kg (168 lb)   SpO2 97%   BMI 29 76 kg/m²        Physical Exam     Physical Exam  Vitals signs and nursing note reviewed  Constitutional:       Appearance: Normal appearance  HENT:      Head: Normocephalic and atraumatic  Cardiovascular:      Rate and Rhythm: Normal rate  Pulses: Normal pulses  Pulmonary:      Effort: Pulmonary effort is normal    Musculoskeletal:         General: Swelling and tenderness present  Skin:     General: Skin is warm and dry  Findings: Erythema present  Comments: Left hand with swelling, erythema, streaking up forearm, generalized TTP, wrist ROM limited, no active drainage  Neurological:      General: No focal deficit present  Mental Status: She is alert and oriented to person, place, and time     Psychiatric:         Mood and Affect: Mood normal          Behavior: Behavior normal

## 2020-09-26 LAB
ANION GAP SERPL CALCULATED.3IONS-SCNC: 9 MMOL/L (ref 4–13)
BASOPHILS # BLD AUTO: 0.02 THOUSANDS/ΜL (ref 0–0.1)
BASOPHILS NFR BLD AUTO: 1 % (ref 0–1)
BUN SERPL-MCNC: 5 MG/DL (ref 5–25)
CALCIUM SERPL-MCNC: 8.1 MG/DL (ref 8.3–10.1)
CHLORIDE SERPL-SCNC: 100 MMOL/L (ref 100–108)
CO2 SERPL-SCNC: 26 MMOL/L (ref 21–32)
CREAT SERPL-MCNC: 0.59 MG/DL (ref 0.6–1.3)
EOSINOPHIL # BLD AUTO: 0.08 THOUSAND/ΜL (ref 0–0.61)
EOSINOPHIL NFR BLD AUTO: 3 % (ref 0–6)
ERYTHROCYTE [DISTWIDTH] IN BLOOD BY AUTOMATED COUNT: 33.9 % (ref 11.6–15.1)
GFR SERPL CREATININE-BSD FRML MDRD: 100 ML/MIN/1.73SQ M
GLUCOSE SERPL-MCNC: 107 MG/DL (ref 65–140)
HCT VFR BLD AUTO: 29.4 % (ref 34.8–46.1)
HGB BLD-MCNC: 8.2 G/DL (ref 11.5–15.4)
IMM GRANULOCYTES # BLD AUTO: 0.02 THOUSAND/UL (ref 0–0.2)
IMM GRANULOCYTES NFR BLD AUTO: 1 % (ref 0–2)
LYMPHOCYTES # BLD AUTO: 0.42 THOUSANDS/ΜL (ref 0.6–4.47)
LYMPHOCYTES NFR BLD AUTO: 15 % (ref 14–44)
MCH RBC QN AUTO: 19.7 PG (ref 26.8–34.3)
MCHC RBC AUTO-ENTMCNC: 27.9 G/DL (ref 31.4–37.4)
MCV RBC AUTO: 71 FL (ref 82–98)
MONOCYTES # BLD AUTO: 0.48 THOUSAND/ΜL (ref 0.17–1.22)
MONOCYTES NFR BLD AUTO: 17 % (ref 4–12)
NEUTROPHILS # BLD AUTO: 1.86 THOUSANDS/ΜL (ref 1.85–7.62)
NEUTS SEG NFR BLD AUTO: 63 % (ref 43–75)
NRBC BLD AUTO-RTO: 0 /100 WBCS
PLATELET # BLD AUTO: 188 THOUSANDS/UL (ref 149–390)
PLATELET # BLD AUTO: 188 THOUSANDS/UL (ref 149–390)
PMV BLD AUTO: 9 FL (ref 8.9–12.7)
PMV BLD AUTO: 9 FL (ref 8.9–12.7)
POTASSIUM SERPL-SCNC: 3.5 MMOL/L (ref 3.5–5.3)
RBC # BLD AUTO: 4.17 MILLION/UL (ref 3.81–5.12)
SODIUM SERPL-SCNC: 135 MMOL/L (ref 136–145)
WBC # BLD AUTO: 2.88 THOUSAND/UL (ref 4.31–10.16)

## 2020-09-26 PROCEDURE — 36415 COLL VENOUS BLD VENIPUNCTURE: CPT | Performed by: PHYSICIAN ASSISTANT

## 2020-09-26 PROCEDURE — 99223 1ST HOSP IP/OBS HIGH 75: CPT | Performed by: INTERNAL MEDICINE

## 2020-09-26 PROCEDURE — RECHECK: Performed by: INTERNAL MEDICINE

## 2020-09-26 PROCEDURE — 85049 AUTOMATED PLATELET COUNT: CPT | Performed by: PHYSICIAN ASSISTANT

## 2020-09-26 PROCEDURE — 80048 BASIC METABOLIC PNL TOTAL CA: CPT | Performed by: PHYSICIAN ASSISTANT

## 2020-09-26 PROCEDURE — 85025 COMPLETE CBC W/AUTO DIFF WBC: CPT | Performed by: PHYSICIAN ASSISTANT

## 2020-09-26 RX ORDER — PAROXETINE HYDROCHLORIDE 20 MG/1
30 TABLET, FILM COATED ORAL DAILY
Status: DISCONTINUED | OUTPATIENT
Start: 2020-09-26 | End: 2020-09-28 | Stop reason: HOSPADM

## 2020-09-26 RX ORDER — LEVOTHYROXINE SODIUM 0.03 MG/1
25 TABLET ORAL
Status: DISCONTINUED | OUTPATIENT
Start: 2020-09-26 | End: 2020-09-28 | Stop reason: HOSPADM

## 2020-09-26 RX ORDER — FERROUS SULFATE 325(65) MG
325 TABLET ORAL
Status: DISCONTINUED | OUTPATIENT
Start: 2020-09-26 | End: 2020-09-28 | Stop reason: HOSPADM

## 2020-09-26 RX ORDER — CALCIUM CARBONATE 200(500)MG
1000 TABLET,CHEWABLE ORAL DAILY PRN
Status: DISCONTINUED | OUTPATIENT
Start: 2020-09-26 | End: 2020-09-28 | Stop reason: HOSPADM

## 2020-09-26 RX ORDER — CEFAZOLIN SODIUM 2 G/50ML
2000 SOLUTION INTRAVENOUS EVERY 8 HOURS
Status: DISCONTINUED | OUTPATIENT
Start: 2020-09-26 | End: 2020-09-28 | Stop reason: HOSPADM

## 2020-09-26 RX ORDER — HEPARIN SODIUM 5000 [USP'U]/ML
5000 INJECTION, SOLUTION INTRAVENOUS; SUBCUTANEOUS EVERY 8 HOURS SCHEDULED
Status: DISCONTINUED | OUTPATIENT
Start: 2020-09-26 | End: 2020-09-28 | Stop reason: HOSPADM

## 2020-09-26 RX ORDER — QUETIAPINE FUMARATE 100 MG/1
400 TABLET, FILM COATED ORAL
Status: DISCONTINUED | OUTPATIENT
Start: 2020-09-26 | End: 2020-09-28 | Stop reason: HOSPADM

## 2020-09-26 RX ORDER — PANTOPRAZOLE SODIUM 40 MG/1
40 TABLET, DELAYED RELEASE ORAL
Status: DISCONTINUED | OUTPATIENT
Start: 2020-09-26 | End: 2020-09-28 | Stop reason: HOSPADM

## 2020-09-26 RX ORDER — LORAZEPAM 1 MG/1
2 TABLET ORAL EVERY 8 HOURS PRN
Status: DISCONTINUED | OUTPATIENT
Start: 2020-09-26 | End: 2020-09-28 | Stop reason: HOSPADM

## 2020-09-26 RX ORDER — ZIPRASIDONE HYDROCHLORIDE 20 MG/1
80 CAPSULE ORAL DAILY
Status: DISCONTINUED | OUTPATIENT
Start: 2020-09-26 | End: 2020-09-28 | Stop reason: HOSPADM

## 2020-09-26 RX ORDER — ACETAMINOPHEN 325 MG/1
650 TABLET ORAL EVERY 6 HOURS PRN
Status: DISCONTINUED | OUTPATIENT
Start: 2020-09-26 | End: 2020-09-28 | Stop reason: HOSPADM

## 2020-09-26 RX ORDER — MECLIZINE HYDROCHLORIDE 25 MG/1
25 TABLET ORAL 3 TIMES DAILY PRN
Status: DISCONTINUED | OUTPATIENT
Start: 2020-09-26 | End: 2020-09-28 | Stop reason: HOSPADM

## 2020-09-26 RX ORDER — SUCRALFATE 1 G/1
1 TABLET ORAL EVERY 6 HOURS SCHEDULED
Status: DISCONTINUED | OUTPATIENT
Start: 2020-09-26 | End: 2020-09-28 | Stop reason: HOSPADM

## 2020-09-26 RX ADMIN — SUCRALFATE 1 G: 1 TABLET ORAL at 04:07

## 2020-09-26 RX ADMIN — LEVOTHYROXINE SODIUM 25 MCG: 25 TABLET ORAL at 06:25

## 2020-09-26 RX ADMIN — HEPARIN SODIUM 5000 UNITS: 5000 INJECTION INTRAVENOUS; SUBCUTANEOUS at 06:25

## 2020-09-26 RX ADMIN — ZIPRASIDONE HYDROCHLORIDE 80 MG: 20 CAPSULE ORAL at 08:46

## 2020-09-26 RX ADMIN — ACETAMINOPHEN 650 MG: 325 TABLET, FILM COATED ORAL at 16:35

## 2020-09-26 RX ADMIN — HEPARIN SODIUM 5000 UNITS: 5000 INJECTION INTRAVENOUS; SUBCUTANEOUS at 21:12

## 2020-09-26 RX ADMIN — SODIUM CHLORIDE 500 ML: 0.9 INJECTION, SOLUTION INTRAVENOUS at 22:22

## 2020-09-26 RX ADMIN — QUETIAPINE FUMARATE 400 MG: 100 TABLET ORAL at 21:12

## 2020-09-26 RX ADMIN — SUCRALFATE 1 G: 1 TABLET ORAL at 18:03

## 2020-09-26 RX ADMIN — PANTOPRAZOLE SODIUM 40 MG: 40 TABLET, DELAYED RELEASE ORAL at 18:03

## 2020-09-26 RX ADMIN — FERROUS SULFATE TAB 325 MG (65 MG ELEMENTAL FE) 325 MG: 325 (65 FE) TAB at 06:25

## 2020-09-26 RX ADMIN — CEFAZOLIN SODIUM 2000 MG: 2 SOLUTION INTRAVENOUS at 18:03

## 2020-09-26 RX ADMIN — CEFAZOLIN SODIUM 2000 MG: 2 SOLUTION INTRAVENOUS at 13:06

## 2020-09-26 RX ADMIN — CEFAZOLIN SODIUM 2000 MG: 2 SOLUTION INTRAVENOUS at 04:06

## 2020-09-26 RX ADMIN — ACETAMINOPHEN 650 MG: 325 TABLET, FILM COATED ORAL at 04:06

## 2020-09-26 RX ADMIN — PAROXETINE 30 MG: 20 TABLET, FILM COATED ORAL at 08:47

## 2020-09-26 RX ADMIN — PANTOPRAZOLE SODIUM 40 MG: 40 TABLET, DELAYED RELEASE ORAL at 06:25

## 2020-09-27 LAB
ANION GAP SERPL CALCULATED.3IONS-SCNC: 5 MMOL/L (ref 4–13)
ANISOCYTOSIS BLD QL SMEAR: PRESENT
BASOPHILS # BLD MANUAL: 0 THOUSAND/UL (ref 0–0.1)
BASOPHILS NFR MAR MANUAL: 0 % (ref 0–1)
BUN SERPL-MCNC: 6 MG/DL (ref 5–25)
CALCIUM SERPL-MCNC: 8.1 MG/DL (ref 8.3–10.1)
CHLORIDE SERPL-SCNC: 105 MMOL/L (ref 100–108)
CO2 SERPL-SCNC: 27 MMOL/L (ref 21–32)
CREAT SERPL-MCNC: 0.66 MG/DL (ref 0.6–1.3)
EOSINOPHIL # BLD MANUAL: 0.13 THOUSAND/UL (ref 0–0.4)
EOSINOPHIL NFR BLD MANUAL: 5 % (ref 0–6)
ERYTHROCYTE [DISTWIDTH] IN BLOOD BY AUTOMATED COUNT: 34 % (ref 11.6–15.1)
GFR SERPL CREATININE-BSD FRML MDRD: 96 ML/MIN/1.73SQ M
GLUCOSE SERPL-MCNC: 93 MG/DL (ref 65–140)
HCT VFR BLD AUTO: 29.3 % (ref 34.8–46.1)
HGB BLD-MCNC: 8 G/DL (ref 11.5–15.4)
HYPERCHROMIA BLD QL SMEAR: PRESENT
LYMPHOCYTES # BLD AUTO: 0.53 THOUSAND/UL (ref 0.6–4.47)
LYMPHOCYTES # BLD AUTO: 20 % (ref 14–44)
MCH RBC QN AUTO: 20 PG (ref 26.8–34.3)
MCHC RBC AUTO-ENTMCNC: 27.3 G/DL (ref 31.4–37.4)
MCV RBC AUTO: 73 FL (ref 82–98)
MICROCYTES BLD QL AUTO: PRESENT
MONOCYTES # BLD AUTO: 0.35 THOUSAND/UL (ref 0–1.22)
MONOCYTES NFR BLD: 13 % (ref 4–12)
NEUTROPHILS # BLD MANUAL: 1.66 THOUSAND/UL (ref 1.85–7.62)
NEUTS BAND NFR BLD MANUAL: 2 % (ref 0–8)
NEUTS SEG NFR BLD AUTO: 60 % (ref 43–75)
NRBC BLD AUTO-RTO: 0 /100 WBCS
OVALOCYTES BLD QL SMEAR: PRESENT
PLATELET # BLD AUTO: 163 THOUSANDS/UL (ref 149–390)
PLATELET BLD QL SMEAR: ADEQUATE
PMV BLD AUTO: 8.9 FL (ref 8.9–12.7)
POIKILOCYTOSIS BLD QL SMEAR: PRESENT
POLYCHROMASIA BLD QL SMEAR: PRESENT
POTASSIUM SERPL-SCNC: 3.5 MMOL/L (ref 3.5–5.3)
RBC # BLD AUTO: 4 MILLION/UL (ref 3.81–5.12)
SODIUM SERPL-SCNC: 137 MMOL/L (ref 136–145)
TOTAL CELLS COUNTED SPEC: 100
WBC # BLD AUTO: 2.67 THOUSAND/UL (ref 4.31–10.16)

## 2020-09-27 PROCEDURE — 80048 BASIC METABOLIC PNL TOTAL CA: CPT | Performed by: PSYCHIATRY & NEUROLOGY

## 2020-09-27 PROCEDURE — 85027 COMPLETE CBC AUTOMATED: CPT | Performed by: PSYCHIATRY & NEUROLOGY

## 2020-09-27 PROCEDURE — 85007 BL SMEAR W/DIFF WBC COUNT: CPT | Performed by: PSYCHIATRY & NEUROLOGY

## 2020-09-27 PROCEDURE — 99232 SBSQ HOSP IP/OBS MODERATE 35: CPT | Performed by: INTERNAL MEDICINE

## 2020-09-27 RX ADMIN — PANTOPRAZOLE SODIUM 40 MG: 40 TABLET, DELAYED RELEASE ORAL at 05:46

## 2020-09-27 RX ADMIN — FERROUS SULFATE TAB 325 MG (65 MG ELEMENTAL FE) 325 MG: 325 (65 FE) TAB at 09:23

## 2020-09-27 RX ADMIN — SUCRALFATE 1 G: 1 TABLET ORAL at 23:32

## 2020-09-27 RX ADMIN — PANTOPRAZOLE SODIUM 40 MG: 40 TABLET, DELAYED RELEASE ORAL at 17:26

## 2020-09-27 RX ADMIN — ZIPRASIDONE HYDROCHLORIDE 80 MG: 20 CAPSULE ORAL at 09:23

## 2020-09-27 RX ADMIN — QUETIAPINE FUMARATE 400 MG: 100 TABLET ORAL at 21:26

## 2020-09-27 RX ADMIN — SUCRALFATE 1 G: 1 TABLET ORAL at 11:55

## 2020-09-27 RX ADMIN — HEPARIN SODIUM 5000 UNITS: 5000 INJECTION INTRAVENOUS; SUBCUTANEOUS at 15:19

## 2020-09-27 RX ADMIN — SUCRALFATE 1 G: 1 TABLET ORAL at 17:27

## 2020-09-27 RX ADMIN — LEVOTHYROXINE SODIUM 25 MCG: 25 TABLET ORAL at 05:46

## 2020-09-27 RX ADMIN — CEFAZOLIN SODIUM 2000 MG: 2 SOLUTION INTRAVENOUS at 19:15

## 2020-09-27 RX ADMIN — HEPARIN SODIUM 5000 UNITS: 5000 INJECTION INTRAVENOUS; SUBCUTANEOUS at 05:49

## 2020-09-27 RX ADMIN — CEFAZOLIN SODIUM 2000 MG: 2 SOLUTION INTRAVENOUS at 03:34

## 2020-09-27 RX ADMIN — CEFAZOLIN SODIUM 2000 MG: 2 SOLUTION INTRAVENOUS at 11:55

## 2020-09-27 RX ADMIN — SUCRALFATE 1 G: 1 TABLET ORAL at 00:20

## 2020-09-27 RX ADMIN — SUCRALFATE 1 G: 1 TABLET ORAL at 05:45

## 2020-09-27 RX ADMIN — HEPARIN SODIUM 5000 UNITS: 5000 INJECTION INTRAVENOUS; SUBCUTANEOUS at 21:26

## 2020-09-27 RX ADMIN — PAROXETINE 30 MG: 20 TABLET, FILM COATED ORAL at 09:23

## 2020-09-28 VITALS
WEIGHT: 163.8 LBS | RESPIRATION RATE: 18 BRPM | SYSTOLIC BLOOD PRESSURE: 130 MMHG | DIASTOLIC BLOOD PRESSURE: 72 MMHG | OXYGEN SATURATION: 99 % | TEMPERATURE: 98.2 F | BODY MASS INDEX: 29.02 KG/M2 | HEIGHT: 63 IN | HEART RATE: 77 BPM

## 2020-09-28 LAB
ANISOCYTOSIS BLD QL SMEAR: PRESENT
BASOPHILS # BLD MANUAL: 0.03 THOUSAND/UL (ref 0–0.1)
BASOPHILS NFR MAR MANUAL: 1 % (ref 0–1)
EOSINOPHIL # BLD MANUAL: 0.08 THOUSAND/UL (ref 0–0.4)
EOSINOPHIL NFR BLD MANUAL: 3 % (ref 0–6)
ERYTHROCYTE [DISTWIDTH] IN BLOOD BY AUTOMATED COUNT: 34.5 % (ref 11.6–15.1)
HCT VFR BLD AUTO: 31.7 % (ref 34.8–46.1)
HGB BLD-MCNC: 8.5 G/DL (ref 11.5–15.4)
HYPERCHROMIA BLD QL SMEAR: PRESENT
LYMPHOCYTES # BLD AUTO: 0.84 THOUSAND/UL (ref 0.6–4.47)
LYMPHOCYTES # BLD AUTO: 30 % (ref 14–44)
MCH RBC QN AUTO: 20 PG (ref 26.8–34.3)
MCHC RBC AUTO-ENTMCNC: 26.8 G/DL (ref 31.4–37.4)
MCV RBC AUTO: 75 FL (ref 82–98)
MICROCYTES BLD QL AUTO: PRESENT
MONOCYTES # BLD AUTO: 0.22 THOUSAND/UL (ref 0–1.22)
MONOCYTES NFR BLD: 8 % (ref 4–12)
NEUTROPHILS # BLD MANUAL: 1.63 THOUSAND/UL (ref 1.85–7.62)
NEUTS SEG NFR BLD AUTO: 58 % (ref 43–75)
NRBC BLD AUTO-RTO: 0 /100 WBCS
OVALOCYTES BLD QL SMEAR: PRESENT
PLATELET # BLD AUTO: 210 THOUSANDS/UL (ref 149–390)
PLATELET BLD QL SMEAR: ADEQUATE
POIKILOCYTOSIS BLD QL SMEAR: PRESENT
POLYCHROMASIA BLD QL SMEAR: PRESENT
RBC # BLD AUTO: 4.25 MILLION/UL (ref 3.81–5.12)
TOTAL CELLS COUNTED SPEC: 100
WBC # BLD AUTO: 2.81 THOUSAND/UL (ref 4.31–10.16)

## 2020-09-28 PROCEDURE — 85007 BL SMEAR W/DIFF WBC COUNT: CPT | Performed by: PHYSICIAN ASSISTANT

## 2020-09-28 PROCEDURE — 85027 COMPLETE CBC AUTOMATED: CPT | Performed by: PHYSICIAN ASSISTANT

## 2020-09-28 PROCEDURE — 99239 HOSP IP/OBS DSCHRG MGMT >30: CPT | Performed by: INTERNAL MEDICINE

## 2020-09-28 RX ORDER — CEPHALEXIN 500 MG/1
500 CAPSULE ORAL EVERY 6 HOURS SCHEDULED
Qty: 17 CAPSULE | Refills: 0 | Status: SHIPPED | OUTPATIENT
Start: 2020-09-28 | End: 2020-10-02

## 2020-09-28 RX ORDER — CEPHALEXIN 500 MG/1
500 CAPSULE ORAL EVERY 6 HOURS SCHEDULED
Status: DISCONTINUED | OUTPATIENT
Start: 2020-09-28 | End: 2020-09-28

## 2020-09-28 RX ADMIN — CEFAZOLIN SODIUM 2000 MG: 2 SOLUTION INTRAVENOUS at 02:51

## 2020-09-28 RX ADMIN — PAROXETINE 30 MG: 20 TABLET, FILM COATED ORAL at 08:52

## 2020-09-28 RX ADMIN — SUCRALFATE 1 G: 1 TABLET ORAL at 12:23

## 2020-09-28 RX ADMIN — HEPARIN SODIUM 5000 UNITS: 5000 INJECTION INTRAVENOUS; SUBCUTANEOUS at 05:40

## 2020-09-28 RX ADMIN — PANTOPRAZOLE SODIUM 40 MG: 40 TABLET, DELAYED RELEASE ORAL at 18:10

## 2020-09-28 RX ADMIN — LEVOTHYROXINE SODIUM 25 MCG: 25 TABLET ORAL at 05:40

## 2020-09-28 RX ADMIN — PANTOPRAZOLE SODIUM 40 MG: 40 TABLET, DELAYED RELEASE ORAL at 05:40

## 2020-09-28 RX ADMIN — FERROUS SULFATE TAB 325 MG (65 MG ELEMENTAL FE) 325 MG: 325 (65 FE) TAB at 08:53

## 2020-09-28 RX ADMIN — SUCRALFATE 1 G: 1 TABLET ORAL at 05:40

## 2020-09-28 RX ADMIN — ZIPRASIDONE HYDROCHLORIDE 80 MG: 20 CAPSULE ORAL at 08:52

## 2020-09-28 RX ADMIN — CEFAZOLIN SODIUM 2000 MG: 2 SOLUTION INTRAVENOUS at 11:06

## 2020-09-28 RX ADMIN — SUCRALFATE 1 G: 1 TABLET ORAL at 18:10

## 2020-09-28 RX ADMIN — CEFAZOLIN SODIUM 2000 MG: 2 SOLUTION INTRAVENOUS at 18:10

## 2020-09-29 ENCOUNTER — TRANSITIONAL CARE MANAGEMENT (OUTPATIENT)
Dept: INTERNAL MEDICINE CLINIC | Facility: CLINIC | Age: 60
End: 2020-09-29

## 2020-10-01 LAB
BACTERIA BLD CULT: NORMAL
BACTERIA BLD CULT: NORMAL

## 2020-10-04 ENCOUNTER — TELEPHONE (OUTPATIENT)
Dept: GASTROENTEROLOGY | Facility: AMBULARY SURGERY CENTER | Age: 60
End: 2020-10-04

## 2020-10-05 ENCOUNTER — TELEPHONE (OUTPATIENT)
Dept: INTERNAL MEDICINE CLINIC | Facility: CLINIC | Age: 60
End: 2020-10-05

## 2020-10-05 DIAGNOSIS — D50.0 IRON DEFICIENCY ANEMIA DUE TO CHRONIC BLOOD LOSS: Primary | ICD-10-CM

## 2020-10-07 LAB
ALBUMIN SERPL-MCNC: 3.6 G/DL (ref 3.6–5.1)
ALBUMIN/GLOB SERPL: 1.4 (CALC) (ref 1–2.5)
ALP SERPL-CCNC: 92 U/L (ref 37–153)
ALT SERPL-CCNC: 12 U/L (ref 6–29)
AST SERPL-CCNC: 18 U/L (ref 10–35)
BASOPHILS # BLD AUTO: 51 CELLS/UL (ref 0–200)
BASOPHILS NFR BLD AUTO: 1.6 %
BILIRUB SERPL-MCNC: 0.2 MG/DL (ref 0.2–1.2)
BUN SERPL-MCNC: 9 MG/DL (ref 7–25)
BUN/CREAT SERPL: ABNORMAL (CALC) (ref 6–22)
CALCIUM SERPL-MCNC: 9.3 MG/DL (ref 8.6–10.4)
CHLORIDE SERPL-SCNC: 98 MMOL/L (ref 98–110)
CO2 SERPL-SCNC: 31 MMOL/L (ref 20–32)
CREAT SERPL-MCNC: 0.69 MG/DL (ref 0.5–0.99)
EOSINOPHIL # BLD AUTO: 70 CELLS/UL (ref 15–500)
EOSINOPHIL NFR BLD AUTO: 2.2 %
GLOBULIN SER CALC-MCNC: 2.5 G/DL (CALC) (ref 1.9–3.7)
GLUCOSE SERPL-MCNC: 86 MG/DL (ref 65–99)
HCT VFR BLD AUTO: 35 % (ref 35–45)
HGB BLD-MCNC: 10.4 G/DL (ref 11.7–15.5)
LYMPHOCYTES # BLD AUTO: 618 CELLS/UL (ref 850–3900)
LYMPHOCYTES NFR BLD AUTO: 19.3 %
MCH RBC QN AUTO: 22.4 PG (ref 27–33)
MCHC RBC AUTO-ENTMCNC: 29.7 G/DL (ref 32–36)
MCV RBC AUTO: 75.4 FL (ref 80–100)
MONOCYTES # BLD AUTO: 262 CELLS/UL (ref 200–950)
MONOCYTES NFR BLD AUTO: 8.2 %
NEUTROPHILS # BLD AUTO: 2198 CELLS/UL (ref 1500–7800)
NEUTROPHILS NFR BLD AUTO: 68.7 %
PLATELET # BLD AUTO: 550 THOUSAND/UL (ref 140–400)
PMV BLD REES-ECKER: 8.9 FL (ref 7.5–12.5)
POTASSIUM SERPL-SCNC: 5 MMOL/L (ref 3.5–5.3)
PROT SERPL-MCNC: 6.1 G/DL (ref 6.1–8.1)
RBC # BLD AUTO: 4.64 MILLION/UL (ref 3.8–5.1)
SERVICE CMNT-IMP: ABNORMAL
SL AMB EGFR AFRICAN AMERICAN: 110 ML/MIN/1.73M2
SL AMB EGFR NON AFRICAN AMERICAN: 95 ML/MIN/1.73M2
SODIUM SERPL-SCNC: 133 MMOL/L (ref 135–146)
WBC # BLD AUTO: 3.2 THOUSAND/UL (ref 3.8–10.8)

## 2020-10-09 ENCOUNTER — TELEPHONE (OUTPATIENT)
Dept: SURGICAL ONCOLOGY | Facility: CLINIC | Age: 60
End: 2020-10-09

## 2020-10-09 ENCOUNTER — OFFICE VISIT (OUTPATIENT)
Dept: INTERNAL MEDICINE CLINIC | Facility: CLINIC | Age: 60
End: 2020-10-09
Payer: COMMERCIAL

## 2020-10-09 VITALS
SYSTOLIC BLOOD PRESSURE: 142 MMHG | OXYGEN SATURATION: 94 % | WEIGHT: 168.8 LBS | HEART RATE: 95 BPM | HEIGHT: 63 IN | RESPIRATION RATE: 16 BRPM | BODY MASS INDEX: 29.91 KG/M2 | TEMPERATURE: 97.3 F | DIASTOLIC BLOOD PRESSURE: 82 MMHG

## 2020-10-09 DIAGNOSIS — E03.9 ACQUIRED HYPOTHYROIDISM: ICD-10-CM

## 2020-10-09 DIAGNOSIS — K22.10 EROSIVE ESOPHAGITIS: ICD-10-CM

## 2020-10-09 DIAGNOSIS — D50.0 IRON DEFICIENCY ANEMIA DUE TO CHRONIC BLOOD LOSS: Primary | ICD-10-CM

## 2020-10-09 DIAGNOSIS — F25.1 SCHIZOAFFECTIVE DISORDER, DEPRESSIVE TYPE (HCC): ICD-10-CM

## 2020-10-09 DIAGNOSIS — L03.114 CELLULITIS OF LEFT UPPER EXTREMITY: ICD-10-CM

## 2020-10-09 PROCEDURE — 99495 TRANSJ CARE MGMT MOD F2F 14D: CPT | Performed by: INTERNAL MEDICINE

## 2020-10-09 PROCEDURE — 1111F DSCHRG MED/CURRENT MED MERGE: CPT | Performed by: INTERNAL MEDICINE

## 2020-10-12 ENCOUNTER — CONSULT (OUTPATIENT)
Dept: HEMATOLOGY ONCOLOGY | Facility: CLINIC | Age: 60
End: 2020-10-12
Payer: COMMERCIAL

## 2020-10-12 VITALS
TEMPERATURE: 98 F | WEIGHT: 170.2 LBS | DIASTOLIC BLOOD PRESSURE: 88 MMHG | HEART RATE: 109 BPM | HEIGHT: 63 IN | RESPIRATION RATE: 18 BRPM | OXYGEN SATURATION: 98 % | BODY MASS INDEX: 30.16 KG/M2 | SYSTOLIC BLOOD PRESSURE: 120 MMHG

## 2020-10-12 DIAGNOSIS — D50.0 IRON DEFICIENCY ANEMIA DUE TO CHRONIC BLOOD LOSS: Primary | ICD-10-CM

## 2020-10-12 PROCEDURE — 1036F TOBACCO NON-USER: CPT | Performed by: INTERNAL MEDICINE

## 2020-10-12 PROCEDURE — 3079F DIAST BP 80-89 MM HG: CPT | Performed by: INTERNAL MEDICINE

## 2020-10-12 PROCEDURE — 99204 OFFICE O/P NEW MOD 45 MIN: CPT | Performed by: INTERNAL MEDICINE

## 2020-10-12 RX ORDER — SODIUM CHLORIDE 9 MG/ML
20 INJECTION, SOLUTION INTRAVENOUS ONCE
Status: CANCELLED | OUTPATIENT
Start: 2020-10-21

## 2020-10-13 ENCOUNTER — HOSPITAL ENCOUNTER (OUTPATIENT)
Dept: RADIOLOGY | Age: 60
Discharge: HOME | End: 2020-10-13
Attending: OTOLARYNGOLOGY
Payer: COMMERCIAL

## 2020-10-13 DIAGNOSIS — IMO0001 ASYMMETRICAL HEARING LOSS OF LEFT EAR: ICD-10-CM

## 2020-10-13 PROCEDURE — 70553 MRI BRAIN STEM W/O & W/DYE: CPT

## 2020-10-13 PROCEDURE — G1004 CDSM NDSC: HCPCS

## 2020-10-13 PROCEDURE — A9585 GADOBUTROL INJECTION: HCPCS | Performed by: OTOLARYNGOLOGY

## 2020-10-13 RX ADMIN — GADOBUTROL 7 ML: 604.72 INJECTION INTRAVENOUS at 11:12

## 2020-10-19 ENCOUNTER — TELEPHONE (OUTPATIENT)
Dept: INTERNAL MEDICINE CLINIC | Facility: CLINIC | Age: 60
End: 2020-10-19

## 2020-10-19 DIAGNOSIS — L03.114 CELLULITIS OF LEFT UPPER EXTREMITY: Primary | ICD-10-CM

## 2020-10-19 RX ORDER — CEPHALEXIN 500 MG/1
500 CAPSULE ORAL EVERY 6 HOURS SCHEDULED
Qty: 20 CAPSULE | Refills: 0 | Status: SHIPPED | OUTPATIENT
Start: 2020-10-19 | End: 2020-10-24

## 2020-10-21 ENCOUNTER — HOSPITAL ENCOUNTER (OUTPATIENT)
Dept: INFUSION CENTER | Facility: HOSPITAL | Age: 60
Discharge: HOME | End: 2020-10-21
Attending: INTERNAL MEDICINE
Payer: COMMERCIAL

## 2020-10-21 VITALS
OXYGEN SATURATION: 97 % | HEART RATE: 95 BPM | SYSTOLIC BLOOD PRESSURE: 107 MMHG | DIASTOLIC BLOOD PRESSURE: 70 MMHG | TEMPERATURE: 98.3 F | RESPIRATION RATE: 18 BRPM

## 2020-10-21 DIAGNOSIS — D50.0 IRON DEFICIENCY ANEMIA DUE TO CHRONIC BLOOD LOSS: Primary | ICD-10-CM

## 2020-10-21 PROCEDURE — 96366 THER/PROPH/DIAG IV INF ADDON: CPT

## 2020-10-21 PROCEDURE — 96365 THER/PROPH/DIAG IV INF INIT: CPT

## 2020-10-21 RX ORDER — SODIUM CHLORIDE 9 MG/ML
20 INJECTION, SOLUTION INTRAVENOUS ONCE
Status: COMPLETED | OUTPATIENT
Start: 2020-10-21 | End: 2020-10-21

## 2020-10-21 RX ORDER — SODIUM CHLORIDE 9 MG/ML
20 INJECTION, SOLUTION INTRAVENOUS ONCE
Status: CANCELLED | OUTPATIENT
Start: 2020-10-28

## 2020-10-21 RX ADMIN — SODIUM CHLORIDE 20 ML/HR: 9 INJECTION, SOLUTION INTRAVENOUS at 13:05

## 2020-10-21 RX ADMIN — IRON SUCROSE 300 MG: 20 INJECTION, SOLUTION INTRAVENOUS at 13:06

## 2020-10-28 ENCOUNTER — HOSPITAL ENCOUNTER (OUTPATIENT)
Dept: INFUSION CENTER | Facility: HOSPITAL | Age: 60
Discharge: HOME | End: 2020-10-28
Attending: INTERNAL MEDICINE
Payer: COMMERCIAL

## 2020-10-28 VITALS
SYSTOLIC BLOOD PRESSURE: 166 MMHG | DIASTOLIC BLOOD PRESSURE: 76 MMHG | RESPIRATION RATE: 20 BRPM | HEART RATE: 100 BPM | TEMPERATURE: 97.2 F

## 2020-10-28 DIAGNOSIS — D50.0 IRON DEFICIENCY ANEMIA DUE TO CHRONIC BLOOD LOSS: Primary | ICD-10-CM

## 2020-10-28 PROCEDURE — 96366 THER/PROPH/DIAG IV INF ADDON: CPT

## 2020-10-28 PROCEDURE — 96365 THER/PROPH/DIAG IV INF INIT: CPT

## 2020-10-28 RX ORDER — SODIUM CHLORIDE 9 MG/ML
20 INJECTION, SOLUTION INTRAVENOUS ONCE
Status: COMPLETED | OUTPATIENT
Start: 2020-10-28 | End: 2020-10-28

## 2020-10-28 RX ORDER — SODIUM CHLORIDE 9 MG/ML
20 INJECTION, SOLUTION INTRAVENOUS ONCE
Status: CANCELLED | OUTPATIENT
Start: 2020-10-28

## 2020-10-28 RX ADMIN — SODIUM CHLORIDE 20 ML/HR: 9 INJECTION, SOLUTION INTRAVENOUS at 11:49

## 2020-10-28 RX ADMIN — IRON SUCROSE 300 MG: 20 INJECTION, SOLUTION INTRAVENOUS at 11:50

## 2020-10-29 ENCOUNTER — TELEPHONE (OUTPATIENT)
Dept: NUTRITION | Facility: HOSPITAL | Age: 60
End: 2020-10-29

## 2020-10-31 ENCOUNTER — HOSPITAL ENCOUNTER (EMERGENCY)
Facility: HOSPITAL | Age: 60
Discharge: HOME | End: 2020-10-31
Attending: EMERGENCY MEDICINE | Admitting: EMERGENCY MEDICINE
Payer: COMMERCIAL

## 2020-10-31 ENCOUNTER — APPOINTMENT (EMERGENCY)
Dept: RADIOLOGY | Facility: HOSPITAL | Age: 60
End: 2020-10-31
Payer: COMMERCIAL

## 2020-10-31 VITALS
TEMPERATURE: 98.2 F | BODY MASS INDEX: 30.11 KG/M2 | DIASTOLIC BLOOD PRESSURE: 79 MMHG | HEART RATE: 78 BPM | WEIGHT: 170 LBS | SYSTOLIC BLOOD PRESSURE: 146 MMHG | RESPIRATION RATE: 18 BRPM | OXYGEN SATURATION: 99 %

## 2020-10-31 DIAGNOSIS — E87.1 HYPONATREMIA: ICD-10-CM

## 2020-10-31 DIAGNOSIS — R13.10 DYSPHAGIA: Primary | ICD-10-CM

## 2020-10-31 LAB
ALBUMIN SERPL BCP-MCNC: 3.4 G/DL (ref 3.5–5)
ALP SERPL-CCNC: 103 U/L (ref 46–116)
ALT SERPL W P-5'-P-CCNC: 24 U/L (ref 12–78)
ANION GAP SERPL CALCULATED.3IONS-SCNC: 7 MMOL/L (ref 4–13)
AST SERPL W P-5'-P-CCNC: 15 U/L (ref 5–45)
BASOPHILS # BLD AUTO: 0.04 THOUSANDS/ΜL (ref 0–0.1)
BASOPHILS NFR BLD AUTO: 1 % (ref 0–1)
BILIRUB SERPL-MCNC: 0.26 MG/DL (ref 0.2–1)
BUN SERPL-MCNC: 7 MG/DL (ref 5–25)
CALCIUM ALBUM COR SERPL-MCNC: 9.4 MG/DL (ref 8.3–10.1)
CALCIUM SERPL-MCNC: 8.9 MG/DL (ref 8.3–10.1)
CHLORIDE SERPL-SCNC: 95 MMOL/L (ref 100–108)
CO2 SERPL-SCNC: 26 MMOL/L (ref 21–32)
CREAT SERPL-MCNC: 0.66 MG/DL (ref 0.6–1.3)
EOSINOPHIL # BLD AUTO: 0.1 THOUSAND/ΜL (ref 0–0.61)
EOSINOPHIL NFR BLD AUTO: 3 % (ref 0–6)
GFR SERPL CREATININE-BSD FRML MDRD: 96 ML/MIN/1.73SQ M
GLUCOSE SERPL-MCNC: 90 MG/DL (ref 65–140)
HCT VFR BLD AUTO: 38.2 % (ref 34.8–46.1)
HGB BLD-MCNC: 12.1 G/DL (ref 11.5–15.4)
IMM GRANULOCYTES # BLD AUTO: 0.01 THOUSAND/UL (ref 0–0.2)
IMM GRANULOCYTES NFR BLD AUTO: 0 % (ref 0–2)
LYMPHOCYTES # BLD AUTO: 0.71 THOUSANDS/ΜL (ref 0.6–4.47)
LYMPHOCYTES NFR BLD AUTO: 17 % (ref 14–44)
MCH RBC QN AUTO: 26 PG (ref 26.8–34.3)
MCHC RBC AUTO-ENTMCNC: 31.7 G/DL (ref 31.4–37.4)
MCV RBC AUTO: 82 FL (ref 82–98)
MONOCYTES # BLD AUTO: 0.31 THOUSAND/ΜL (ref 0.17–1.22)
MONOCYTES NFR BLD AUTO: 8 % (ref 4–12)
NEUTROPHILS # BLD AUTO: 2.91 THOUSANDS/ΜL (ref 1.85–7.62)
NEUTS SEG NFR BLD AUTO: 71 % (ref 43–75)
NRBC BLD AUTO-RTO: 0 /100 WBCS
PLATELET # BLD AUTO: 271 THOUSANDS/UL (ref 149–390)
PMV BLD AUTO: 8.3 FL (ref 8.9–12.7)
POTASSIUM SERPL-SCNC: 3.8 MMOL/L (ref 3.5–5.3)
PROT SERPL-MCNC: 6.9 G/DL (ref 6.4–8.2)
RBC # BLD AUTO: 4.65 MILLION/UL (ref 3.81–5.12)
SODIUM SERPL-SCNC: 128 MMOL/L (ref 136–145)
WBC # BLD AUTO: 4.08 THOUSAND/UL (ref 4.31–10.16)

## 2020-10-31 PROCEDURE — 80053 COMPREHEN METABOLIC PANEL: CPT | Performed by: PHYSICIAN ASSISTANT

## 2020-10-31 PROCEDURE — 71045 X-RAY EXAM CHEST 1 VIEW: CPT

## 2020-10-31 PROCEDURE — 99284 EMERGENCY DEPT VISIT MOD MDM: CPT

## 2020-10-31 PROCEDURE — 96361 HYDRATE IV INFUSION ADD-ON: CPT

## 2020-10-31 PROCEDURE — 85025 COMPLETE CBC W/AUTO DIFF WBC: CPT | Performed by: PHYSICIAN ASSISTANT

## 2020-10-31 PROCEDURE — 36415 COLL VENOUS BLD VENIPUNCTURE: CPT | Performed by: PHYSICIAN ASSISTANT

## 2020-10-31 PROCEDURE — 99284 EMERGENCY DEPT VISIT MOD MDM: CPT | Performed by: PHYSICIAN ASSISTANT

## 2020-10-31 PROCEDURE — 96360 HYDRATION IV INFUSION INIT: CPT

## 2020-10-31 RX ORDER — SUCRALFATE ORAL 1 G/10ML
1 SUSPENSION ORAL 4 TIMES DAILY
Qty: 420 ML | Refills: 0 | OUTPATIENT
Start: 2020-10-31 | End: 2020-11-23

## 2020-10-31 RX ORDER — SUCRALFATE ORAL 1 G/10ML
1000 SUSPENSION ORAL ONCE
Status: DISCONTINUED | OUTPATIENT
Start: 2020-10-31 | End: 2020-10-31

## 2020-10-31 RX ORDER — SUCRALFATE 1 G/1
1 TABLET ORAL ONCE
Status: COMPLETED | OUTPATIENT
Start: 2020-10-31 | End: 2020-10-31

## 2020-10-31 RX ADMIN — SODIUM CHLORIDE 1000 ML: 0.9 INJECTION, SOLUTION INTRAVENOUS at 11:17

## 2020-10-31 RX ADMIN — SUCRALFATE 1 G: 1 TABLET ORAL at 12:21

## 2020-11-02 DIAGNOSIS — F41.9 ANXIETY: Primary | ICD-10-CM

## 2020-11-02 RX ORDER — PAROXETINE 30 MG/1
30 TABLET, FILM COATED ORAL DAILY
Qty: 30 TABLET | Refills: 5 | Status: SHIPPED | OUTPATIENT
Start: 2020-11-02 | End: 2020-12-14 | Stop reason: SDUPTHER

## 2020-11-18 ENCOUNTER — TELEPHONE (OUTPATIENT)
Dept: INTERNAL MEDICINE CLINIC | Facility: CLINIC | Age: 60
End: 2020-11-18

## 2020-11-18 DIAGNOSIS — Z12.31 ENCOUNTER FOR SCREENING MAMMOGRAM FOR BREAST CANCER: Primary | ICD-10-CM

## 2020-11-21 LAB
BASOPHILS # BLD AUTO: 31 CELLS/UL (ref 0–200)
BASOPHILS NFR BLD AUTO: 1.3 %
EOSINOPHIL # BLD AUTO: 60 CELLS/UL (ref 15–500)
EOSINOPHIL NFR BLD AUTO: 2.5 %
ERYTHROCYTE [DISTWIDTH] IN BLOOD BY AUTOMATED COUNT: 20.4 % (ref 11–15)
FERRITIN SERPL-MCNC: 116 NG/ML (ref 16–232)
HCT VFR BLD AUTO: 37.7 % (ref 35–45)
HGB BLD-MCNC: 12.2 G/DL (ref 11.7–15.5)
IRON SATN MFR SERPL: 15 % (CALC) (ref 16–45)
IRON SERPL-MCNC: 54 MCG/DL (ref 45–160)
LYMPHOCYTES # BLD AUTO: 552 CELLS/UL (ref 850–3900)
LYMPHOCYTES NFR BLD AUTO: 23 %
MCH RBC QN AUTO: 27.4 PG (ref 27–33)
MCHC RBC AUTO-ENTMCNC: 32.4 G/DL (ref 32–36)
MCV RBC AUTO: 84.5 FL (ref 80–100)
MONOCYTES # BLD AUTO: 170 CELLS/UL (ref 200–950)
MONOCYTES NFR BLD AUTO: 7.1 %
NEUTROPHILS # BLD AUTO: 1586 CELLS/UL (ref 1500–7800)
NEUTROPHILS NFR BLD AUTO: 66.1 %
PLATELET # BLD AUTO: 254 THOUSAND/UL (ref 140–400)
PMV BLD REES-ECKER: 8.9 FL (ref 7.5–12.5)
RBC # BLD AUTO: 4.46 MILLION/UL (ref 3.8–5.1)
TIBC SERPL-MCNC: 360 MCG/DL (CALC) (ref 250–450)
WBC # BLD AUTO: 2.4 THOUSAND/UL (ref 3.8–10.8)

## 2020-11-22 ENCOUNTER — HOSPITAL ENCOUNTER (OUTPATIENT)
Facility: HOSPITAL | Age: 60
Setting detail: OBSERVATION
Discharge: HOME | End: 2020-11-23
Attending: EMERGENCY MEDICINE | Admitting: INTERNAL MEDICINE
Payer: COMMERCIAL

## 2020-11-22 DIAGNOSIS — D64.9 ANEMIA, UNSPECIFIED TYPE: Primary | ICD-10-CM

## 2020-11-22 DIAGNOSIS — D64.9 SYMPTOMATIC ANEMIA: ICD-10-CM

## 2020-11-22 DIAGNOSIS — E87.1 HYPONATREMIA: ICD-10-CM

## 2020-11-22 DIAGNOSIS — K21.00 GASTROESOPHAGEAL REFLUX DISEASE WITH ESOPHAGITIS: ICD-10-CM

## 2020-11-22 PROBLEM — K92.1 MELENA: Status: ACTIVE | Noted: 2020-11-22

## 2020-11-22 LAB
ABO GROUP BLD: NORMAL
ANION GAP SERPL CALCULATED.3IONS-SCNC: 9 MMOL/L (ref 4–13)
ATRIAL RATE: 82 BPM
BASOPHILS # BLD AUTO: 0.03 THOUSANDS/ΜL (ref 0–0.1)
BASOPHILS NFR BLD AUTO: 1 % (ref 0–1)
BLD GP AB SCN SERPL QL: NEGATIVE
BUN SERPL-MCNC: 5 MG/DL (ref 5–25)
CALCIUM SERPL-MCNC: 8.4 MG/DL (ref 8.3–10.1)
CHLORIDE SERPL-SCNC: 95 MMOL/L (ref 100–108)
CO2 SERPL-SCNC: 24 MMOL/L (ref 21–32)
CREAT SERPL-MCNC: 0.53 MG/DL (ref 0.6–1.3)
EOSINOPHIL # BLD AUTO: 0.17 THOUSAND/ΜL (ref 0–0.61)
EOSINOPHIL NFR BLD AUTO: 5 % (ref 0–6)
ERYTHROCYTE [DISTWIDTH] IN BLOOD BY AUTOMATED COUNT: 19.3 % (ref 11.6–15.1)
FERRITIN SERPL-MCNC: 94 NG/ML (ref 8–388)
GFR SERPL CREATININE-BSD FRML MDRD: 104 ML/MIN/1.73SQ M
GLUCOSE SERPL-MCNC: 115 MG/DL (ref 65–140)
HCT VFR BLD AUTO: 31.6 % (ref 34.8–46.1)
HGB BLD-MCNC: 10.3 G/DL (ref 11.5–15.4)
IMM GRANULOCYTES # BLD AUTO: 0.01 THOUSAND/UL (ref 0–0.2)
IMM GRANULOCYTES NFR BLD AUTO: 0 % (ref 0–2)
IRON SATN MFR SERPL: 15 %
IRON SERPL-MCNC: 50 UG/DL (ref 50–170)
LYMPHOCYTES # BLD AUTO: 0.98 THOUSANDS/ΜL (ref 0.6–4.47)
LYMPHOCYTES NFR BLD AUTO: 28 % (ref 14–44)
MCH RBC QN AUTO: 27.1 PG (ref 26.8–34.3)
MCHC RBC AUTO-ENTMCNC: 32.6 G/DL (ref 31.4–37.4)
MCV RBC AUTO: 83 FL (ref 82–98)
MONOCYTES # BLD AUTO: 0.25 THOUSAND/ΜL (ref 0.17–1.22)
MONOCYTES NFR BLD AUTO: 7 % (ref 4–12)
NEUTROPHILS # BLD AUTO: 2.04 THOUSANDS/ΜL (ref 1.85–7.62)
NEUTS SEG NFR BLD AUTO: 59 % (ref 43–75)
NRBC BLD AUTO-RTO: 0 /100 WBCS
P AXIS: 56 DEGREES
PLATELET # BLD AUTO: 206 THOUSANDS/UL (ref 149–390)
PMV BLD AUTO: 8.1 FL (ref 8.9–12.7)
POTASSIUM SERPL-SCNC: 3.5 MMOL/L (ref 3.5–5.3)
PR INTERVAL: 138 MS
QRS AXIS: 46 DEGREES
QRSD INTERVAL: 84 MS
QT INTERVAL: 382 MS
QTC INTERVAL: 446 MS
RBC # BLD AUTO: 3.8 MILLION/UL (ref 3.81–5.12)
RH BLD: POSITIVE
SODIUM SERPL-SCNC: 128 MMOL/L (ref 136–145)
SPECIMEN EXPIRATION DATE: NORMAL
T WAVE AXIS: 62 DEGREES
TIBC SERPL-MCNC: 327 UG/DL (ref 250–450)
VENTRICULAR RATE: 82 BPM
WBC # BLD AUTO: 3.48 THOUSAND/UL (ref 4.31–10.16)

## 2020-11-22 PROCEDURE — 93005 ELECTROCARDIOGRAM TRACING: CPT

## 2020-11-22 PROCEDURE — 83540 ASSAY OF IRON: CPT | Performed by: PHYSICIAN ASSISTANT

## 2020-11-22 PROCEDURE — 86901 BLOOD TYPING SEROLOGIC RH(D): CPT | Performed by: EMERGENCY MEDICINE

## 2020-11-22 PROCEDURE — C9113 INJ PANTOPRAZOLE SODIUM, VIA: HCPCS | Performed by: PHYSICIAN ASSISTANT

## 2020-11-22 PROCEDURE — 93010 ELECTROCARDIOGRAM REPORT: CPT | Performed by: INTERNAL MEDICINE

## 2020-11-22 PROCEDURE — 83550 IRON BINDING TEST: CPT | Performed by: PHYSICIAN ASSISTANT

## 2020-11-22 PROCEDURE — 82728 ASSAY OF FERRITIN: CPT | Performed by: PHYSICIAN ASSISTANT

## 2020-11-22 PROCEDURE — 99285 EMERGENCY DEPT VISIT HI MDM: CPT

## 2020-11-22 PROCEDURE — 99220 PR INITIAL OBSERVATION CARE/DAY 70 MINUTES: CPT | Performed by: INTERNAL MEDICINE

## 2020-11-22 PROCEDURE — 80048 BASIC METABOLIC PNL TOTAL CA: CPT | Performed by: EMERGENCY MEDICINE

## 2020-11-22 PROCEDURE — 86850 RBC ANTIBODY SCREEN: CPT | Performed by: EMERGENCY MEDICINE

## 2020-11-22 PROCEDURE — 85025 COMPLETE CBC W/AUTO DIFF WBC: CPT | Performed by: EMERGENCY MEDICINE

## 2020-11-22 PROCEDURE — 86900 BLOOD TYPING SEROLOGIC ABO: CPT | Performed by: EMERGENCY MEDICINE

## 2020-11-22 PROCEDURE — 99285 EMERGENCY DEPT VISIT HI MDM: CPT | Performed by: EMERGENCY MEDICINE

## 2020-11-22 PROCEDURE — 36415 COLL VENOUS BLD VENIPUNCTURE: CPT | Performed by: EMERGENCY MEDICINE

## 2020-11-22 RX ORDER — SUCRALFATE 1 G/1
1 TABLET ORAL EVERY 6 HOURS SCHEDULED
Status: DISCONTINUED | OUTPATIENT
Start: 2020-11-22 | End: 2020-11-23 | Stop reason: HOSPADM

## 2020-11-22 RX ORDER — FERROUS SULFATE 325(65) MG
325 TABLET ORAL 2 TIMES DAILY WITH MEALS
Status: DISCONTINUED | OUTPATIENT
Start: 2020-11-23 | End: 2020-11-23 | Stop reason: HOSPADM

## 2020-11-22 RX ORDER — LORAZEPAM 1 MG/1
2 TABLET ORAL EVERY 8 HOURS PRN
Status: DISCONTINUED | OUTPATIENT
Start: 2020-11-22 | End: 2020-11-23 | Stop reason: HOSPADM

## 2020-11-22 RX ORDER — ONDANSETRON 2 MG/ML
4 INJECTION INTRAMUSCULAR; INTRAVENOUS EVERY 6 HOURS PRN
Status: DISCONTINUED | OUTPATIENT
Start: 2020-11-22 | End: 2020-11-23 | Stop reason: HOSPADM

## 2020-11-22 RX ORDER — LEVOTHYROXINE SODIUM 0.03 MG/1
25 TABLET ORAL
Status: DISCONTINUED | OUTPATIENT
Start: 2020-11-23 | End: 2020-11-23 | Stop reason: HOSPADM

## 2020-11-22 RX ORDER — PANTOPRAZOLE SODIUM 40 MG/1
40 INJECTION, POWDER, FOR SOLUTION INTRAVENOUS EVERY 12 HOURS
Status: DISCONTINUED | OUTPATIENT
Start: 2020-11-22 | End: 2020-11-23 | Stop reason: HOSPADM

## 2020-11-22 RX ORDER — MECLIZINE HCL 12.5 MG/1
25 TABLET ORAL 3 TIMES DAILY PRN
Status: DISCONTINUED | OUTPATIENT
Start: 2020-11-22 | End: 2020-11-23 | Stop reason: HOSPADM

## 2020-11-22 RX ORDER — ZIPRASIDONE HYDROCHLORIDE 20 MG/1
80 CAPSULE ORAL DAILY
Status: DISCONTINUED | OUTPATIENT
Start: 2020-11-23 | End: 2020-11-23 | Stop reason: HOSPADM

## 2020-11-22 RX ORDER — SODIUM CHLORIDE 9 MG/ML
50 INJECTION, SOLUTION INTRAVENOUS CONTINUOUS
Status: DISCONTINUED | OUTPATIENT
Start: 2020-11-22 | End: 2020-11-23 | Stop reason: HOSPADM

## 2020-11-22 RX ORDER — QUETIAPINE FUMARATE 100 MG/1
400 TABLET, FILM COATED ORAL
Status: DISCONTINUED | OUTPATIENT
Start: 2020-11-22 | End: 2020-11-23 | Stop reason: HOSPADM

## 2020-11-22 RX ADMIN — SUCRALFATE 1 G: 1 TABLET ORAL at 17:52

## 2020-11-22 RX ADMIN — QUETIAPINE 400 MG: 200 TABLET, FILM COATED ORAL at 23:06

## 2020-11-22 RX ADMIN — SODIUM CHLORIDE 50 ML/HR: 0.9 INJECTION, SOLUTION INTRAVENOUS at 19:30

## 2020-11-22 RX ADMIN — PANTOPRAZOLE SODIUM 40 MG: 40 INJECTION, POWDER, FOR SOLUTION INTRAVENOUS at 17:52

## 2020-11-22 RX ADMIN — IRON SUCROSE 300 MG: 20 INJECTION, SOLUTION INTRAVENOUS at 17:52

## 2020-11-23 VITALS
OXYGEN SATURATION: 95 % | HEART RATE: 94 BPM | TEMPERATURE: 98.2 F | WEIGHT: 158.51 LBS | DIASTOLIC BLOOD PRESSURE: 84 MMHG | SYSTOLIC BLOOD PRESSURE: 128 MMHG | BODY MASS INDEX: 28.08 KG/M2 | RESPIRATION RATE: 16 BRPM

## 2020-11-23 LAB
ANION GAP SERPL CALCULATED.3IONS-SCNC: 9 MMOL/L (ref 4–13)
BASOPHILS # BLD AUTO: 0.03 THOUSANDS/ΜL (ref 0–0.1)
BASOPHILS NFR BLD AUTO: 2 % (ref 0–1)
BUN SERPL-MCNC: 1 MG/DL (ref 5–25)
CALCIUM SERPL-MCNC: 8.7 MG/DL (ref 8.3–10.1)
CHLORIDE SERPL-SCNC: 104 MMOL/L (ref 100–108)
CO2 SERPL-SCNC: 26 MMOL/L (ref 21–32)
CREAT SERPL-MCNC: 0.6 MG/DL (ref 0.6–1.3)
EOSINOPHIL # BLD AUTO: 0.12 THOUSAND/ΜL (ref 0–0.61)
EOSINOPHIL NFR BLD AUTO: 6 % (ref 0–6)
ERYTHROCYTE [DISTWIDTH] IN BLOOD BY AUTOMATED COUNT: 19.2 % (ref 11.6–15.1)
GFR SERPL CREATININE-BSD FRML MDRD: 99 ML/MIN/1.73SQ M
GLUCOSE SERPL-MCNC: 90 MG/DL (ref 65–140)
HCT VFR BLD AUTO: 36.9 % (ref 34.8–46.1)
HGB BLD-MCNC: 11.9 G/DL (ref 11.5–15.4)
IMM GRANULOCYTES # BLD AUTO: 0 THOUSAND/UL (ref 0–0.2)
IMM GRANULOCYTES NFR BLD AUTO: 0 % (ref 0–2)
LYMPHOCYTES # BLD AUTO: 0.51 THOUSANDS/ΜL (ref 0.6–4.47)
LYMPHOCYTES NFR BLD AUTO: 27 % (ref 14–44)
MCH RBC QN AUTO: 27.4 PG (ref 26.8–34.3)
MCHC RBC AUTO-ENTMCNC: 32.2 G/DL (ref 31.4–37.4)
MCV RBC AUTO: 85 FL (ref 82–98)
MONOCYTES # BLD AUTO: 0.17 THOUSAND/ΜL (ref 0.17–1.22)
MONOCYTES NFR BLD AUTO: 9 % (ref 4–12)
NEUTROPHILS # BLD AUTO: 1.07 THOUSANDS/ΜL (ref 1.85–7.62)
NEUTS SEG NFR BLD AUTO: 56 % (ref 43–75)
NRBC BLD AUTO-RTO: 0 /100 WBCS
PLATELET # BLD AUTO: 224 THOUSANDS/UL (ref 149–390)
PMV BLD AUTO: 8.3 FL (ref 8.9–12.7)
POTASSIUM SERPL-SCNC: 4.3 MMOL/L (ref 3.5–5.3)
RBC # BLD AUTO: 4.35 MILLION/UL (ref 3.81–5.12)
SODIUM SERPL-SCNC: 139 MMOL/L (ref 136–145)
WBC # BLD AUTO: 1.9 THOUSAND/UL (ref 4.31–10.16)

## 2020-11-23 PROCEDURE — 85025 COMPLETE CBC W/AUTO DIFF WBC: CPT | Performed by: PHYSICIAN ASSISTANT

## 2020-11-23 PROCEDURE — 36415 COLL VENOUS BLD VENIPUNCTURE: CPT | Performed by: PHYSICIAN ASSISTANT

## 2020-11-23 PROCEDURE — 80048 BASIC METABOLIC PNL TOTAL CA: CPT | Performed by: PHYSICIAN ASSISTANT

## 2020-11-23 PROCEDURE — 99217 PR OBSERVATION CARE DISCHARGE MANAGEMENT: CPT | Performed by: PHYSICIAN ASSISTANT

## 2020-11-23 PROCEDURE — C9113 INJ PANTOPRAZOLE SODIUM, VIA: HCPCS | Performed by: PHYSICIAN ASSISTANT

## 2020-11-23 RX ORDER — FERROUS SULFATE TAB EC 324 MG (65 MG FE EQUIVALENT) 324 (65 FE) MG
324 TABLET DELAYED RESPONSE ORAL
Qty: 60 TABLET | Refills: 1 | Status: SHIPPED | OUTPATIENT
Start: 2020-11-23 | End: 2020-11-23 | Stop reason: HOSPADM

## 2020-11-23 RX ORDER — SUCRALFATE 1 G/1
1 TABLET ORAL EVERY 6 HOURS SCHEDULED
Qty: 120 TABLET | Refills: 0 | Status: SHIPPED | OUTPATIENT
Start: 2020-11-23 | End: 2021-01-08

## 2020-11-23 RX ORDER — FERROUS SULFATE 325(65) MG
325 TABLET ORAL 2 TIMES DAILY WITH MEALS
Qty: 60 TABLET | Refills: 1 | Status: ON HOLD | OUTPATIENT
Start: 2020-11-23 | End: 2022-01-12 | Stop reason: SDUPTHER

## 2020-11-23 RX ADMIN — FERROUS SULFATE TAB 325 MG (65 MG ELEMENTAL FE) 325 MG: 325 (65 FE) TAB at 06:32

## 2020-11-23 RX ADMIN — LEVOTHYROXINE SODIUM 25 MCG: 25 TABLET ORAL at 06:32

## 2020-11-23 RX ADMIN — ZIPRASIDONE HYDROCHLORIDE 80 MG: 20 CAPSULE ORAL at 09:47

## 2020-11-23 RX ADMIN — PAROXETINE HYDROCHLORIDE 30 MG: 20 TABLET, FILM COATED ORAL at 09:48

## 2020-11-23 RX ADMIN — PANTOPRAZOLE SODIUM 40 MG: 40 INJECTION, POWDER, FOR SOLUTION INTRAVENOUS at 06:32

## 2020-11-23 RX ADMIN — SUCRALFATE 1 G: 1 TABLET ORAL at 06:32

## 2020-12-05 ENCOUNTER — TELEPHONE (OUTPATIENT)
Dept: OTHER | Facility: OTHER | Age: 60
End: 2020-12-05

## 2020-12-06 ENCOUNTER — HOSPITAL ENCOUNTER (EMERGENCY)
Facility: HOSPITAL | Age: 60
Discharge: HOME | End: 2020-12-06
Attending: EMERGENCY MEDICINE
Payer: COMMERCIAL

## 2020-12-06 ENCOUNTER — TELEPHONE (OUTPATIENT)
Dept: GASTROENTEROLOGY | Facility: AMBULARY SURGERY CENTER | Age: 60
End: 2020-12-06

## 2020-12-06 VITALS
RESPIRATION RATE: 18 BRPM | HEIGHT: 63 IN | OXYGEN SATURATION: 96 % | WEIGHT: 169.8 LBS | HEART RATE: 74 BPM | SYSTOLIC BLOOD PRESSURE: 112 MMHG | BODY MASS INDEX: 30.09 KG/M2 | DIASTOLIC BLOOD PRESSURE: 73 MMHG | TEMPERATURE: 98.7 F

## 2020-12-06 DIAGNOSIS — R10.13 EPIGASTRIC PAIN: ICD-10-CM

## 2020-12-06 DIAGNOSIS — K22.10 EROSIVE ESOPHAGITIS: ICD-10-CM

## 2020-12-06 DIAGNOSIS — K92.0 HEMATEMESIS: Primary | ICD-10-CM

## 2020-12-06 LAB
ALBUMIN SERPL BCP-MCNC: 3.5 G/DL (ref 3.5–5)
ALP SERPL-CCNC: 94 U/L (ref 46–116)
ALT SERPL W P-5'-P-CCNC: 29 U/L (ref 12–78)
ANION GAP SERPL CALCULATED.3IONS-SCNC: 9 MMOL/L (ref 4–13)
AST SERPL W P-5'-P-CCNC: 17 U/L (ref 5–45)
ATRIAL RATE: 73 BPM
BASOPHILS # BLD AUTO: 0.04 THOUSANDS/ΜL (ref 0–0.1)
BASOPHILS NFR BLD AUTO: 1 % (ref 0–1)
BILIRUB SERPL-MCNC: 0.23 MG/DL (ref 0.2–1)
BUN SERPL-MCNC: 7 MG/DL (ref 5–25)
CALCIUM SERPL-MCNC: 8.9 MG/DL (ref 8.3–10.1)
CHLORIDE SERPL-SCNC: 96 MMOL/L (ref 100–108)
CO2 SERPL-SCNC: 24 MMOL/L (ref 21–32)
CREAT SERPL-MCNC: 0.84 MG/DL (ref 0.6–1.3)
EOSINOPHIL # BLD AUTO: 0.13 THOUSAND/ΜL (ref 0–0.61)
EOSINOPHIL NFR BLD AUTO: 4 % (ref 0–6)
ERYTHROCYTE [DISTWIDTH] IN BLOOD BY AUTOMATED COUNT: 16.5 % (ref 11.6–15.1)
GFR SERPL CREATININE-BSD FRML MDRD: 76 ML/MIN/1.73SQ M
GLUCOSE SERPL-MCNC: 94 MG/DL (ref 65–140)
HCT VFR BLD AUTO: 37.7 % (ref 34.8–46.1)
HGB BLD-MCNC: 12.2 G/DL (ref 11.5–15.4)
IMM GRANULOCYTES # BLD AUTO: 0.01 THOUSAND/UL (ref 0–0.2)
IMM GRANULOCYTES NFR BLD AUTO: 0 % (ref 0–2)
LIPASE SERPL-CCNC: 71 U/L (ref 73–393)
LYMPHOCYTES # BLD AUTO: 1.05 THOUSANDS/ΜL (ref 0.6–4.47)
LYMPHOCYTES NFR BLD AUTO: 30 % (ref 14–44)
MCH RBC QN AUTO: 27.5 PG (ref 26.8–34.3)
MCHC RBC AUTO-ENTMCNC: 32.4 G/DL (ref 31.4–37.4)
MCV RBC AUTO: 85 FL (ref 82–98)
MONOCYTES # BLD AUTO: 0.28 THOUSAND/ΜL (ref 0.17–1.22)
MONOCYTES NFR BLD AUTO: 8 % (ref 4–12)
NEUTROPHILS # BLD AUTO: 2.01 THOUSANDS/ΜL (ref 1.85–7.62)
NEUTS SEG NFR BLD AUTO: 57 % (ref 43–75)
NRBC BLD AUTO-RTO: 0 /100 WBCS
P AXIS: 33 DEGREES
PLATELET # BLD AUTO: 223 THOUSANDS/UL (ref 149–390)
PMV BLD AUTO: 7.8 FL (ref 8.9–12.7)
POTASSIUM SERPL-SCNC: 3.1 MMOL/L (ref 3.5–5.3)
PR INTERVAL: 154 MS
PROT SERPL-MCNC: 6.5 G/DL (ref 6.4–8.2)
QRS AXIS: 7 DEGREES
QRSD INTERVAL: 80 MS
QT INTERVAL: 400 MS
QTC INTERVAL: 435 MS
RBC # BLD AUTO: 4.43 MILLION/UL (ref 3.81–5.12)
SODIUM SERPL-SCNC: 129 MMOL/L (ref 136–145)
T WAVE AXIS: 44 DEGREES
TROPONIN I SERPL-MCNC: <0.02 NG/ML
VENTRICULAR RATE: 71 BPM
WBC # BLD AUTO: 3.52 THOUSAND/UL (ref 4.31–10.16)

## 2020-12-06 PROCEDURE — 83690 ASSAY OF LIPASE: CPT | Performed by: PSYCHIATRY & NEUROLOGY

## 2020-12-06 PROCEDURE — 36415 COLL VENOUS BLD VENIPUNCTURE: CPT | Performed by: PSYCHIATRY & NEUROLOGY

## 2020-12-06 PROCEDURE — 84484 ASSAY OF TROPONIN QUANT: CPT | Performed by: PSYCHIATRY & NEUROLOGY

## 2020-12-06 PROCEDURE — 93005 ELECTROCARDIOGRAM TRACING: CPT

## 2020-12-06 PROCEDURE — 80053 COMPREHEN METABOLIC PANEL: CPT | Performed by: PSYCHIATRY & NEUROLOGY

## 2020-12-06 PROCEDURE — 85025 COMPLETE CBC W/AUTO DIFF WBC: CPT | Performed by: PSYCHIATRY & NEUROLOGY

## 2020-12-06 PROCEDURE — 93010 ELECTROCARDIOGRAM REPORT: CPT | Performed by: INTERNAL MEDICINE

## 2020-12-06 PROCEDURE — 99284 EMERGENCY DEPT VISIT MOD MDM: CPT | Performed by: EMERGENCY MEDICINE

## 2020-12-06 PROCEDURE — 99285 EMERGENCY DEPT VISIT HI MDM: CPT

## 2020-12-06 RX ORDER — ONDANSETRON 4 MG/1
4 TABLET, ORALLY DISINTEGRATING ORAL ONCE
Status: COMPLETED | OUTPATIENT
Start: 2020-12-06 | End: 2020-12-06

## 2020-12-06 RX ORDER — LIDOCAINE HYDROCHLORIDE 20 MG/ML
10 SOLUTION OROPHARYNGEAL 4 TIMES DAILY PRN
Status: DISCONTINUED | OUTPATIENT
Start: 2020-12-06 | End: 2020-12-06

## 2020-12-06 RX ORDER — MAGNESIUM HYDROXIDE/ALUMINUM HYDROXICE/SIMETHICONE 120; 1200; 1200 MG/30ML; MG/30ML; MG/30ML
30 SUSPENSION ORAL ONCE
Status: COMPLETED | OUTPATIENT
Start: 2020-12-06 | End: 2020-12-06

## 2020-12-06 RX ORDER — ONDANSETRON 2 MG/ML
4 INJECTION INTRAMUSCULAR; INTRAVENOUS ONCE
Status: DISCONTINUED | OUTPATIENT
Start: 2020-12-06 | End: 2020-12-06

## 2020-12-06 RX ORDER — SUCRALFATE ORAL 1 G/10ML
1 SUSPENSION ORAL 4 TIMES DAILY
Qty: 200 ML | Refills: 0 | Status: SHIPPED | OUTPATIENT
Start: 2020-12-06 | End: 2020-12-23 | Stop reason: SDUPTHER

## 2020-12-06 RX ORDER — POTASSIUM CHLORIDE 20 MEQ/1
40 TABLET, EXTENDED RELEASE ORAL ONCE
Status: COMPLETED | OUTPATIENT
Start: 2020-12-06 | End: 2020-12-06

## 2020-12-06 RX ORDER — ONDANSETRON 4 MG/1
4 TABLET, ORALLY DISINTEGRATING ORAL EVERY 6 HOURS PRN
Qty: 20 TABLET | Refills: 0 | Status: SHIPPED | OUTPATIENT
Start: 2020-12-06 | End: 2021-01-22 | Stop reason: SDUPTHER

## 2020-12-06 RX ADMIN — ONDANSETRON 4 MG: 4 TABLET, ORALLY DISINTEGRATING ORAL at 15:29

## 2020-12-06 RX ADMIN — POTASSIUM CHLORIDE 40 MEQ: 1500 TABLET, EXTENDED RELEASE ORAL at 17:13

## 2020-12-06 RX ADMIN — ALUMINUM HYDROXIDE, MAGNESIUM HYDROXIDE, AND SIMETHICONE 30 ML: 200; 200; 20 SUSPENSION ORAL at 17:12

## 2020-12-06 RX ADMIN — LIDOCAINE HYDROCHLORIDE 10 ML: 20 SOLUTION ORAL; TOPICAL at 17:13

## 2020-12-12 ENCOUNTER — NURSE TRIAGE (OUTPATIENT)
Dept: OTHER | Facility: OTHER | Age: 60
End: 2020-12-12

## 2020-12-14 DIAGNOSIS — F41.9 ANXIETY: ICD-10-CM

## 2020-12-14 RX ORDER — LORAZEPAM 2 MG/1
2 TABLET ORAL EVERY 8 HOURS PRN
Qty: 90 TABLET | Refills: 1 | Status: SHIPPED | OUTPATIENT
Start: 2020-12-14 | End: 2021-01-26 | Stop reason: SDUPTHER

## 2020-12-14 RX ORDER — PAROXETINE 30 MG/1
30 TABLET, FILM COATED ORAL DAILY
Qty: 30 TABLET | Refills: 5 | Status: SHIPPED | OUTPATIENT
Start: 2020-12-14 | End: 2020-12-18 | Stop reason: SDUPTHER

## 2020-12-14 RX ORDER — LORAZEPAM 2 MG/1
2 TABLET ORAL EVERY 8 HOURS PRN
Qty: 90 TABLET | Refills: 1 | Status: SHIPPED | OUTPATIENT
Start: 2020-12-14 | End: 2020-12-14 | Stop reason: SDUPTHER

## 2020-12-18 ENCOUNTER — TELEMEDICINE (OUTPATIENT)
Dept: INTERNAL MEDICINE CLINIC | Facility: CLINIC | Age: 60
End: 2020-12-18
Payer: COMMERCIAL

## 2020-12-18 DIAGNOSIS — E03.9 ACQUIRED HYPOTHYROIDISM: Primary | ICD-10-CM

## 2020-12-18 DIAGNOSIS — F41.9 ANXIETY: ICD-10-CM

## 2020-12-18 DIAGNOSIS — K22.10 EROSIVE ESOPHAGITIS: ICD-10-CM

## 2020-12-18 PROCEDURE — 1111F DSCHRG MED/CURRENT MED MERGE: CPT | Performed by: INTERNAL MEDICINE

## 2020-12-18 PROCEDURE — 99214 OFFICE O/P EST MOD 30 MIN: CPT | Performed by: INTERNAL MEDICINE

## 2020-12-18 PROCEDURE — 1036F TOBACCO NON-USER: CPT | Performed by: INTERNAL MEDICINE

## 2020-12-18 RX ORDER — PAROXETINE 30 MG/1
60 TABLET, FILM COATED ORAL DAILY
Qty: 180 TABLET | Refills: 3 | Status: SHIPPED | OUTPATIENT
Start: 2020-12-18 | End: 2021-09-07

## 2020-12-23 DIAGNOSIS — K92.0 HEMATEMESIS: ICD-10-CM

## 2020-12-23 DIAGNOSIS — R10.13 EPIGASTRIC PAIN: ICD-10-CM

## 2020-12-23 DIAGNOSIS — K22.10 EROSIVE ESOPHAGITIS: ICD-10-CM

## 2020-12-23 RX ORDER — SUCRALFATE ORAL 1 G/10ML
1 SUSPENSION ORAL 4 TIMES DAILY
Qty: 200 ML | Refills: 5 | Status: SHIPPED | OUTPATIENT
Start: 2020-12-23 | End: 2021-02-24 | Stop reason: SDUPTHER

## 2021-01-08 ENCOUNTER — OFFICE VISIT (OUTPATIENT)
Dept: GASTROENTEROLOGY | Facility: CLINIC | Age: 61
End: 2021-01-08
Payer: COMMERCIAL

## 2021-01-08 VITALS
DIASTOLIC BLOOD PRESSURE: 71 MMHG | BODY MASS INDEX: 29.06 KG/M2 | WEIGHT: 164 LBS | HEIGHT: 63 IN | SYSTOLIC BLOOD PRESSURE: 112 MMHG | HEART RATE: 86 BPM

## 2021-01-08 DIAGNOSIS — K44.9 HIATAL HERNIA WITH GASTROESOPHAGEAL REFLUX DISEASE AND ESOPHAGITIS: Primary | ICD-10-CM

## 2021-01-08 DIAGNOSIS — K59.00 CONSTIPATION, UNSPECIFIED CONSTIPATION TYPE: ICD-10-CM

## 2021-01-08 DIAGNOSIS — K22.10 EROSIVE ESOPHAGITIS: ICD-10-CM

## 2021-01-08 DIAGNOSIS — K21.00 HIATAL HERNIA WITH GASTROESOPHAGEAL REFLUX DISEASE AND ESOPHAGITIS: Primary | ICD-10-CM

## 2021-01-08 DIAGNOSIS — K63.5 POLYP OF COLON, UNSPECIFIED PART OF COLON, UNSPECIFIED TYPE: ICD-10-CM

## 2021-01-08 PROCEDURE — 3078F DIAST BP <80 MM HG: CPT | Performed by: NURSE PRACTITIONER

## 2021-01-08 PROCEDURE — 99214 OFFICE O/P EST MOD 30 MIN: CPT | Performed by: NURSE PRACTITIONER

## 2021-01-08 PROCEDURE — 3074F SYST BP LT 130 MM HG: CPT | Performed by: NURSE PRACTITIONER

## 2021-01-08 RX ORDER — OMEPRAZOLE 40 MG/1
40 CAPSULE, DELAYED RELEASE ORAL 2 TIMES DAILY
Qty: 180 CAPSULE | Refills: 3 | Status: SHIPPED | OUTPATIENT
Start: 2021-01-08 | End: 2021-05-14 | Stop reason: HOSPADM

## 2021-01-08 NOTE — PROGRESS NOTES
[facility] - Outpatient Consultation  Rob Urbina 61 y o  female MRN: [id number]  Encounter: [id number]          ASSESSMENT AND PLAN:      1  Erosive esophagitis  Patient reports intermittent episodes of nausea, vomiting, and acid reflux  EGD done 09/22/2020 showed normal duodenum, random biopsies taken  Mild gastritis  Medium-sized hiatal hernia with no obvious Geo's erosions  Severe grade D erosive esophagitis, extending through the lower half of the esophagus  Patient was currently taking omeprazole 40 mg daily and sucralfate 1 g 4 times a day  Will increase omeprazole (PriLOSEC) 40 MG capsule; Take 1 capsule (40 mg total) by mouth 2 (two) times a day  Continue sucralfate 1 g 4 times a day  Patient will have repeat EGD  Prep and procedure explained to the patient  Further recommendations pending results of EGD  Will reassess symptoms on follow-up  2  Hiatal hernia with gastroesophageal reflux disease and esophagitis  EGD done 09/22/2020 showed normal duodenum, random biopsies taken  Mild gastritis  Medium-sized hiatal hernia with no obvious Geo's erosions  Severe grade D erosive esophagitis, extending through the lower half of the esophagus  3-4 cm hiatal hernia seen on previous EGD per Dr Hua Plascencia  Patient was currently taking omeprazole 40 mg daily and sucralfate 1 g 4 times a day  Will increase omeprazole (PriLOSEC) 40 MG capsule; Take 1 capsule (40 mg total) by mouth 2 (two) times a day  Continue sucralfate 1 g 4 times a day  Patient will have repeat EGD  Prep and procedure explained to the patient  Further recommendations pending results of EGD  Patient may need robotic assisted hernia repair with hybrid TIF procedure done  Further recommendations will be made after the EGD  Patient may also need esophageal manometry prior to the TIF procedure  3  Constipation, unspecified constipation type  Patient has history of constipation    Patient reports constipation is controlled with use of Dulcolax and MiraLax as needed  Will continue current regimen for constipation  4  History of Colon Polyps  Patient has history of colon polyps  Positive family history of colon cancer maternal grandmother  Last colonoscopy done 09/22/2020 which showed 1 sessile colon polyps removed  Biopsy report not available  Patient will be due for repeat colonoscopy in 4 years  ______________________________________________________________________    HPI:  Delta Nogueira is a 26-year-old female with multiple past medical history including GERD with esophagitis, hiatal hernia, constipation, hypothyroidism, hypertension, and gets schizoaffective disorder who presents to office with complaints of acid reflux, nausea, and vomiting and patient would also like to obtain information concerning TIF procedure  Patient reports she has had a hiatal hernia for many years and has been experiencing acid reflux with nausea intermittently but over the last several weeks that acid reflux, nausea, and vomiting has increased in severity despite being on sucralfate and omeprazole  Patient also reported that approximately 1 week ago she had to go to the emergency room because she vomited blood  Patient reported at the emergency room they did check her hemoglobin which was stable and at that time discharged to home to continue with the Carafate and omeprazole in was informed to follow-up with gastrointestinal physician  Patient's last episode of vomiting was approximately 2 days ago  Patient does report vomiting several times throughout the week  Patient denies any further episodes of bloody emesis since she was seen in the emergency room  She is taken Zofran as needed with relief of nausea and vomiting  Patient also reports a history of constipation  Patient states her constipation is controlled with use of dulcolax and MiraLax as needed    Patient reports last bowel movement was 2 days ago  Patient denies diarrhea  Denies blood in stool, blood from rectal area, or black tarry stool  Denies heartburn or abdominal pain  The patient does experience intermittent episodes of epigastric pain in epigastric area is tender to palpation  Patient is on no blood thinners or antiplatelets  Patient denies any recent MI, kidney issues, or lung problems  Positive family history of colon cancer maternal grandmother and esophageal cancer father  Last colonoscopy and EGD done 09/22/2020  EGD done 09/22/2020 showed normal duodenum, random biopsies taken  Mild gastritis  Medium-sized hiatal hernia with no obvious Geo's erosions  Severe grade D erosive esophagitis, extending through the lower half of the esophagus  Biopsy results from EGD showed benign duodenum mucosa, no active or chronic duodenitis, no dysplasia or evidence of malignancy  Colonoscopy showed one sessile polyp in the transverse colon, otherwise normal colon  Repeat colonoscopy recommended in 5 years due to history of colon polyps  REVIEW OF SYSTEMS:    CONSTITUTIONAL: Denies any fever, chills, rigors, and weight loss  HEENT: No earache or tinnitus  Denies hearing loss or visual disturbances  CARDIOVASCULAR: No chest pain or palpitations  RESPIRATORY: Denies any cough, hemoptysis, shortness of breath or dyspnea on exertion  GASTROINTESTINAL: As noted in the History of Present Illness  GENITOURINARY: No problems with urination  Denies any hematuria or dysuria  NEUROLOGIC: No dizziness or vertigo, denies headaches  MUSCULOSKELETAL: Denies any muscle or joint pain  SKIN: Denies skin rashes or itching  ENDOCRINE: Denies excessive thirst  Denies intolerance to heat or cold  PSYCHOSOCIAL: Denies depression or anxiety  Denies any recent memory loss         Historical Information   Past Medical History:   Diagnosis Date    Anxiety     Back pain at L4-L5 level     Schreiber esophagus     Bulimia     Colon polyp  Disease of thyroid gland     hypothyroid    Ear problems     GERD (gastroesophageal reflux disease)     History of transfusion     Hyperlipidemia     Hypothyroidism     Leukopenia     Nasal congestion     NMS (neuroleptic malignant syndrome) 01/03/2020    Rheumatoid arthritis involving right hip (HCC)     Sciatica     Seizure ([address] )     due to medication mix up 8/2018 only one historically    Synovial cyst of lumbar spine     Vertigo     Weight gain      Past Surgical History:   Procedure Laterality Date    BONE MARROW BIOPSY      BREAST SURGERY      enlargement     CLOSED REDUCTION DISTAL FEMUR FRACTURE      COLONOSCOPY      COSMETIC SURGERY      HIP ARTHROPLASTY Right 1/2/2020    Procedure: REVISION TOTAL HIP ARTHROPLASTY WITH REPAIR OF PARIPROSTHETIC FRACTURE - POSTERIOR APPROACH;  Surgeon: Chelita Lomas MD;  [facility];  Service: Orthopedics    AL TOTAL HIP ARTHROPLASTY Right 12/11/2019    Procedure: ARTHROPLASTY HIP TOTAL ANTERIOR;  Surgeon: Chelita Lomas MD;  [facility];  Service: Orthopedics    RHINOPLASTY      SINUS SURGERY       Social History   Social History     Substance and Sexual Activity   Alcohol Use Never    Frequency: Never    Binge frequency: Never     Social History     Substance and Sexual Activity   Drug Use No     Social History     Tobacco Use   Smoking Status Never Smoker   Smokeless Tobacco Never Used     Family History   Problem Relation Age of Onset    Uterine cancer Mother     Esophageal cancer Father     Colon cancer Maternal Grandmother        Meds/Allergies       Current Outpatient Medications:     ferrous sulfate 325 (65 Fe) mg tablet    levothyroxine 25 mcg tablet    LORazepam (ATIVAN) 2 mg tablet    meclizine (ANTIVERT) 25 mg tablet    Multiple Vitamin (MULTIVITAMIN) capsule    ondansetron (ZOFRAN-ODT) 4 mg disintegrating tablet    PARoxetine (PAXIL) 30 mg tablet    QUEtiapine (SEROquel) 400 MG tablet    triamcinolone (KENALOG) 0 1 % cream    ziprasidone (GEODON) 80 mg capsule    omeprazole (PriLOSEC) 40 MG capsule    sucralfate (CARAFATE) 1 g/10 mL suspension    Allergies   Allergen Reactions    Risperdal [Risperidone] Shortness Of Breath     Rapid heart beat, SOB    Zyprexa [Olanzapine] Shortness Of Breath     Rapid heartbeat    Latex Rash     Band aids, adhesives wears normal underwear, can eat bananas  USE PAPER TAPE           Objective     Blood pressure 112/71, pulse 86, height 5' 3" (1 6 m), weight 74 4 kg (164 lb), not currently breastfeeding  Body mass index is 29 05 kg/m²  PHYSICAL EXAM:      General Appearance:   Alert, cooperative, no distress   HEENT:   Normocephalic, atraumatic, anicteric      Neck:  Supple, symmetrical, trachea midline   Lungs:   Clear to auscultation bilaterally; no rales, rhonchi or wheezing; respirations unlabored    Heart[de-identified]   Regular rate and rhythm; no murmur, rub, or gallop  Abdomen:   Soft, tender to palpation in epigastric area, non-distended; normal bowel sounds; no masses, no organomegaly    Genitalia:   Deferred    Rectal:   Deferred    Extremities:  No cyanosis, clubbing or edema    Pulses:  2+ and symmetric    Skin:  No jaundice, rashes, or lesions    Lymph nodes:  No palpable cervical lymphadenopathy        Lab Results:   No visits with results within 1 Day(s) from this visit     Latest known visit with results is:   Admission on 12/06/2020, Discharged on 12/06/2020   Component Date Value    WBC 12/06/2020 3 52*    RBC 12/06/2020 4 43     Hemoglobin 12/06/2020 12 2     Hematocrit 12/06/2020 37 7     MCV 12/06/2020 85     MCH 12/06/2020 27 5     MCHC 12/06/2020 32 4     RDW 12/06/2020 16 5*    MPV 12/06/2020 7 8*    Platelets 20/90/9953 223     nRBC 12/06/2020 0     Neutrophils Relative 12/06/2020 57     Immat GRANS % 12/06/2020 0     Lymphocytes Relative 12/06/2020 30     Monocytes Relative 12/06/2020 8     Eosinophils Relative 12/06/2020 4     Basophils Relative 12/06/2020 1     Neutrophils Absolute 12/06/2020 2 01     Immature Grans Absolute 12/06/2020 0 01     Lymphocytes Absolute 12/06/2020 1 05     Monocytes Absolute 12/06/2020 0 28     Eosinophils Absolute 12/06/2020 0 13     Basophils Absolute 12/06/2020 0 04     Sodium 12/06/2020 129*    Potassium 12/06/2020 3 1*    Chloride 12/06/2020 96*    CO2 12/06/2020 24     ANION GAP 12/06/2020 9     BUN 12/06/2020 7     Creatinine 12/06/2020 0 84     Glucose 12/06/2020 94     Calcium 12/06/2020 8 9     AST 12/06/2020 17     ALT 12/06/2020 29     Alkaline Phosphatase 12/06/2020 94     Total Protein 12/06/2020 6 5     Albumin 12/06/2020 3 5     Total Bilirubin 12/06/2020 0 23     eGFR 12/06/2020 76     Troponin I 12/06/2020 <0 02     Lipase 12/06/2020 71*    Ventricular Rate 12/06/2020 71     Atrial Rate 12/06/2020 73     UT Interval 12/06/2020 154     QRSD Interval 12/06/2020 80     QT Interval 12/06/2020 400     QTC Interval 12/06/2020 435     P Axis 12/06/2020 33     QRS Axis 12/06/2020 7     T Wave Axis 12/06/2020 44          Radiology Results:   No results found

## 2021-01-08 NOTE — LETTER
January 21, 2021     Referral [facility]    Patient: Nelson Suh   YOB: 1960   Date of Visit: 1/8/2021       Dear Dr Marilyn Kat:    Thank you for referring Nelson Suh to me for evaluation  Below are my notes for this consultation  If you have questions, please do not hesitate to call me  I look forward to following your patient along with you  Sincerely,        Gabriella Quinonez MD        CC: MD Yoana Johns, [facility]  1/8/2021  1:05 PM  Attested  [facility] - Outpatient Consultation  Nelson Suh 61 y o  female MRN: [id number]  Encounter: [id number]          ASSESSMENT AND PLAN:      1  Erosive esophagitis  Patient reports intermittent episodes of nausea, vomiting, and acid reflux  EGD done 09/22/2020 showed normal duodenum, random biopsies taken  Mild gastritis  Medium-sized hiatal hernia with no obvious Geo's erosions  Severe grade D erosive esophagitis, extending through the lower half of the esophagus  Patient was currently taking omeprazole 40 mg daily and sucralfate 1 g 4 times a day  Will increase omeprazole (PriLOSEC) 40 MG capsule; Take 1 capsule (40 mg total) by mouth 2 (two) times a day  Continue sucralfate 1 g 4 times a day  Patient will have repeat EGD  Prep and procedure explained to the patient  Further recommendations pending results of EGD  Will reassess symptoms on follow-up  2  Hiatal hernia with gastroesophageal reflux disease and esophagitis  EGD done 09/22/2020 showed normal duodenum, random biopsies taken  Mild gastritis  Medium-sized hiatal hernia with no obvious Geo's erosions  Severe grade D erosive esophagitis, extending through the lower half of the esophagus  3-4 cm hiatal hernia seen on previous EGD per Dr Sanjay White  Patient was currently taking omeprazole 40 mg daily and sucralfate 1 g 4 times a day    Will increase omeprazole (PriLOSEC) 40 MG capsule; Take 1 capsule (40 mg total) by mouth 2 (two) times a day  Continue sucralfate 1 g 4 times a day  Patient will have repeat EGD  Prep and procedure explained to the patient  Further recommendations pending results of EGD  Patient may need robotic assisted hernia repair with hybrid TIF procedure done  Further recommendations will be made after the EGD  Patient may also need esophageal manometry prior to the TIF procedure  3  Constipation, unspecified constipation type  Patient has history of constipation  Patient reports constipation is controlled with use of Dulcolax and MiraLax as needed  Will continue current regimen for constipation  4  History of Colon Polyps  Patient has history of colon polyps  Positive family history of colon cancer maternal grandmother  Last colonoscopy done 09/22/2020 which showed 1 sessile colon polyps removed  Biopsy report not available  Patient will be due for repeat colonoscopy in 4 years  ______________________________________________________________________    HPI:  Delta Villavicencio is a 77-year-old female with multiple past medical history including GERD with esophagitis, hiatal hernia, constipation, hypothyroidism, hypertension, and gets schizoaffective disorder who presents to office with complaints of acid reflux, nausea, and vomiting and patient would also like to obtain information concerning TIF procedure  Patient reports she has had a hiatal hernia for many years and has been experiencing acid reflux with nausea intermittently but over the last several weeks that acid reflux, nausea, and vomiting has increased in severity despite being on sucralfate and omeprazole  Patient also reported that approximately 1 week ago she had to go to the emergency room because she vomited blood    Patient reported at the emergency room they did check her hemoglobin which was stable and at that time discharged to home to continue with the Carafate and omeprazole in was informed to follow-up with gastrointestinal physician  Patient's last episode of vomiting was approximately 2 days ago  Patient does report vomiting several times throughout the week  Patient denies any further episodes of bloody emesis since she was seen in the emergency room  She is taken Zofran as needed with relief of nausea and vomiting  Patient also reports a history of constipation  Patient states her constipation is controlled with use of dulcolax and MiraLax as needed  Patient reports last bowel movement was 2 days ago  Patient denies diarrhea  Denies blood in stool, blood from rectal area, or black tarry stool  Denies heartburn or abdominal pain  The patient does experience intermittent episodes of epigastric pain in epigastric area is tender to palpation  Patient is on no blood thinners or antiplatelets  Patient denies any recent MI, kidney issues, or lung problems  Positive family history of colon cancer maternal grandmother and esophageal cancer father  Last colonoscopy and EGD done 09/22/2020  EGD done 09/22/2020 showed normal duodenum, random biopsies taken  Mild gastritis  Medium-sized hiatal hernia with no obvious Geo's erosions  Severe grade D erosive esophagitis, extending through the lower half of the esophagus  Biopsy results from EGD showed benign duodenum mucosa, no active or chronic duodenitis, no dysplasia or evidence of malignancy  Colonoscopy showed one sessile polyp in the transverse colon, otherwise normal colon  Repeat colonoscopy recommended in 5 years due to history of colon polyps  REVIEW OF SYSTEMS:    CONSTITUTIONAL: Denies any fever, chills, rigors, and weight loss  HEENT: No earache or tinnitus  Denies hearing loss or visual disturbances  CARDIOVASCULAR: No chest pain or palpitations  RESPIRATORY: Denies any cough, hemoptysis, shortness of breath or dyspnea on exertion  GASTROINTESTINAL: As noted in the History of Present Illness  GENITOURINARY: No problems with urination  Denies any hematuria or dysuria  NEUROLOGIC: No dizziness or vertigo, denies headaches  MUSCULOSKELETAL: Denies any muscle or joint pain  SKIN: Denies skin rashes or itching  ENDOCRINE: Denies excessive thirst  Denies intolerance to heat or cold  PSYCHOSOCIAL: Denies depression or anxiety  Denies any recent memory loss         Historical Information   Past Medical History:   Diagnosis Date    Anxiety     Back pain at L4-L5 level     Schreiber esophagus     Bulimia     Colon polyp     Disease of thyroid gland     hypothyroid    Ear problems     GERD (gastroesophageal reflux disease)     History of transfusion     Hyperlipidemia     Hypothyroidism     Leukopenia     Nasal congestion     NMS (neuroleptic malignant syndrome) 01/03/2020    Rheumatoid arthritis involving right hip (HCC)     Sciatica     Seizure ([address] )     due to medication mix up 8/2018 only one historically    Synovial cyst of lumbar spine     Vertigo     Weight gain      Past Surgical History:   Procedure Laterality Date    BONE MARROW BIOPSY      BREAST SURGERY      enlargement     CLOSED REDUCTION DISTAL FEMUR FRACTURE      COLONOSCOPY      COSMETIC SURGERY      HIP ARTHROPLASTY Right 1/2/2020    Procedure: REVISION TOTAL HIP ARTHROPLASTY WITH REPAIR OF PARIPROSTHETIC FRACTURE - POSTERIOR APPROACH;  Surgeon: Camille Gibbons MD;  [facility];  Service: Orthopedics    GA TOTAL HIP ARTHROPLASTY Right 12/11/2019    Procedure: ARTHROPLASTY HIP TOTAL ANTERIOR;  Surgeon: Camille Gibbons MD;  [facility];  Service: Orthopedics    RHINOPLASTY      SINUS SURGERY       Social History   Social History     Substance and Sexual Activity   Alcohol Use Never    Frequency: Never    Binge frequency: Never     Social History     Substance and Sexual Activity   Drug Use No     Social History     Tobacco Use   Smoking Status Never Smoker   Smokeless Tobacco Never Used Family History   Problem Relation Age of Onset    Uterine cancer Mother     Esophageal cancer Father     Colon cancer Maternal Grandmother        Meds/Allergies       Current Outpatient Medications:     ferrous sulfate 325 (65 Fe) mg tablet    levothyroxine 25 mcg tablet    LORazepam (ATIVAN) 2 mg tablet    meclizine (ANTIVERT) 25 mg tablet    Multiple Vitamin (MULTIVITAMIN) capsule    ondansetron (ZOFRAN-ODT) 4 mg disintegrating tablet    PARoxetine (PAXIL) 30 mg tablet    QUEtiapine (SEROquel) 400 MG tablet    triamcinolone (KENALOG) 0 1 % cream    ziprasidone (GEODON) 80 mg capsule    omeprazole (PriLOSEC) 40 MG capsule    sucralfate (CARAFATE) 1 g/10 mL suspension    Allergies   Allergen Reactions    Risperdal [Risperidone] Shortness Of Breath     Rapid heart beat, SOB    Zyprexa [Olanzapine] Shortness Of Breath     Rapid heartbeat    Latex Rash     Band aids, adhesives wears normal underwear, can eat bananas  USE PAPER TAPE           Objective     Blood pressure 112/71, pulse 86, height 5' 3" (1 6 m), weight 74 4 kg (164 lb), not currently breastfeeding  Body mass index is 29 05 kg/m²  PHYSICAL EXAM:      General Appearance:   Alert, cooperative, no distress   HEENT:   Normocephalic, atraumatic, anicteric      Neck:  Supple, symmetrical, trachea midline   Lungs:   Clear to auscultation bilaterally; no rales, rhonchi or wheezing; respirations unlabored    Heart[de-identified]   Regular rate and rhythm; no murmur, rub, or gallop  Abdomen:   Soft, tender to palpation in epigastric area, non-distended; normal bowel sounds; no masses, no organomegaly    Genitalia:   Deferred    Rectal:   Deferred    Extremities:  No cyanosis, clubbing or edema    Pulses:  2+ and symmetric    Skin:  No jaundice, rashes, or lesions    Lymph nodes:  No palpable cervical lymphadenopathy        Lab Results:   No visits with results within 1 Day(s) from this visit     Latest known visit with results is:   Admission on 12/06/2020, Discharged on 12/06/2020   Component Date Value    WBC 12/06/2020 3 52*    RBC 12/06/2020 4 43     Hemoglobin 12/06/2020 12 2     Hematocrit 12/06/2020 37 7     MCV 12/06/2020 85     MCH 12/06/2020 27 5     MCHC 12/06/2020 32 4     RDW 12/06/2020 16 5*    MPV 12/06/2020 7 8*    Platelets 06/65/3080 223     nRBC 12/06/2020 0     Neutrophils Relative 12/06/2020 57     Immat GRANS % 12/06/2020 0     Lymphocytes Relative 12/06/2020 30     Monocytes Relative 12/06/2020 8     Eosinophils Relative 12/06/2020 4     Basophils Relative 12/06/2020 1     Neutrophils Absolute 12/06/2020 2 01     Immature Grans Absolute 12/06/2020 0 01     Lymphocytes Absolute 12/06/2020 1 05     Monocytes Absolute 12/06/2020 0 28     Eosinophils Absolute 12/06/2020 0 13     Basophils Absolute 12/06/2020 0 04     Sodium 12/06/2020 129*    Potassium 12/06/2020 3 1*    Chloride 12/06/2020 96*    CO2 12/06/2020 24     ANION GAP 12/06/2020 9     BUN 12/06/2020 7     Creatinine 12/06/2020 0 84     Glucose 12/06/2020 94     Calcium 12/06/2020 8 9     AST 12/06/2020 17     ALT 12/06/2020 29     Alkaline Phosphatase 12/06/2020 94     Total Protein 12/06/2020 6 5     Albumin 12/06/2020 3 5     Total Bilirubin 12/06/2020 0 23     eGFR 12/06/2020 76     Troponin I 12/06/2020 <0 02     Lipase 12/06/2020 71*    Ventricular Rate 12/06/2020 71     Atrial Rate 12/06/2020 73     AL Interval 12/06/2020 154     QRSD Interval 12/06/2020 80     QT Interval 12/06/2020 400     QTC Interval 12/06/2020 435     P Axis 12/06/2020 33     QRS Axis 12/06/2020 7     T Wave Axis 12/06/2020 44          Radiology Results:   No results found  Attestation signed by Minoo Joseph MD at 1/8/2021  2:18 PM:  Patient seen and examined, and agree with above  History of severe GERD with medium-size hiatal hernia and ulcerative esophagitis, prior endoscopy report was reviewed, will increase omeprazole dose to 40 mg p o  B i d , liquid Carafate 1 g 3 times a day, will schedule repeat endoscopy to document healing of the ulcer and then plan for hiatal hernia repair along with transoral incision less fundoplication, risk and benefit of the procedure was discussed

## 2021-01-12 ENCOUNTER — TELEMEDICINE (OUTPATIENT)
Dept: INTERNAL MEDICINE CLINIC | Facility: CLINIC | Age: 61
End: 2021-01-12
Payer: COMMERCIAL

## 2021-01-12 VITALS — BODY MASS INDEX: 27.28 KG/M2 | WEIGHT: 154 LBS

## 2021-01-12 DIAGNOSIS — F41.9 ANXIETY: ICD-10-CM

## 2021-01-12 DIAGNOSIS — K22.10 EROSIVE ESOPHAGITIS: ICD-10-CM

## 2021-01-12 DIAGNOSIS — K21.00 GASTROESOPHAGEAL REFLUX DISEASE WITH ESOPHAGITIS WITHOUT HEMORRHAGE: Primary | Chronic | ICD-10-CM

## 2021-01-12 DIAGNOSIS — E03.9 ACQUIRED HYPOTHYROIDISM: ICD-10-CM

## 2021-01-12 PROCEDURE — 99213 OFFICE O/P EST LOW 20 MIN: CPT | Performed by: INTERNAL MEDICINE

## 2021-01-12 PROCEDURE — 3725F SCREEN DEPRESSION PERFORMED: CPT | Performed by: INTERNAL MEDICINE

## 2021-01-12 NOTE — PATIENT INSTRUCTIONS
Problem List Items Addressed This Visit        Digestive    Gastroesophageal reflux disease with esophagitis without hemorrhage - Primary (Chronic)     Continue PPI and follow-up GI         Erosive esophagitis     Continue proton pump inhibitor and follow-up GI            Endocrine    Acquired hypothyroidism     Continue levothyroxine along with monitoring            Other    Anxiety     Continue medications and follow-up Psychiatry

## 2021-01-12 NOTE — PROGRESS NOTES
Virtual Regular Visit      Assessment/Plan:    Problem List Items Addressed This Visit        Digestive    Gastroesophageal reflux disease with esophagitis without hemorrhage - Primary (Chronic)     Continue PPI and follow-up GI         Erosive esophagitis     Continue proton pump inhibitor and follow-up GI            Endocrine    Acquired hypothyroidism     Continue levothyroxine along with monitoring            Other    Anxiety     Continue medications and follow-up Psychiatry               BMI Counseling: Body mass index is 27 28 kg/m²  The BMI is above normal  Nutrition recommendations include encouraging healthy choices of fruits and vegetables and moderation in carbohydrate intake  Exercise recommendations include exercising 3-5 times per week  Reason for visit is   Chief Complaint   Patient presents with    Virtual Regular Visit        Encounter provider Marta Baca MD    Provider located at [address] [place] [id number]      Recent Visits  No visits were found meeting these conditions  Showing recent visits within past 7 days and meeting all other requirements     Today's Visits  Date Type Provider Dept   01/12/21 Telemedicine Marta Baca MD [facility] today's visits and meeting all other requirements     Future Appointments  No visits were found meeting these conditions  Showing future appointments within next 150 days and meeting all other requirements        The patient was identified by name and date of birth  Verónica Polaris was informed that this is a telemedicine visit and that the visit is being conducted through [facility] and patient was informed that this is a secure, HIPAA-compliant platform  She agrees to proceed     My office door was closed  No one else was in the room  She acknowledged consent and understanding of privacy and security of the video platform   The patient has agreed to participate and understands they can discontinue the visit at any time  Patient is aware this is a billable service  Subjective  Kinza Epstein is a 61 y o  female    Esophagitis with GI bleeding:  Patient had trip to the emergency room for this, no bleeding problems since out of hospital regarding vomiting, but she does have some black tarry stool, but she is also on iron  Discussed with patient that iron can turn the stool black, but it should not cause to be sticky  No problems with food getting stuck, but she is very careful about chewing her food completely, and she has been eating more soft foods    Hypothyroidism:  Patient admits to fatigue and constipation, no cold intolerance  She is on levothyroxine    Anxiety: pt has stress living with her mother  She sees psych         Past Medical History:   Diagnosis Date    Anxiety     Back pain at L4-L5 level     Schreiber esophagus     Bulimia     Colon polyp     Disease of thyroid gland     hypothyroid    Ear problems     GERD (gastroesophageal reflux disease)     History of transfusion     Hyperlipidemia     Hypothyroidism     Leukopenia     Nasal congestion     NMS (neuroleptic malignant syndrome) 01/03/2020    Rheumatoid arthritis involving right hip (HCC)     Sciatica     Seizure ([address] )     due to medication mix up 8/2018 only one historically    Synovial cyst of lumbar spine     Vertigo     Weight gain        Past Surgical History:   Procedure Laterality Date    BONE MARROW BIOPSY      BREAST SURGERY      enlargement     CLOSED REDUCTION DISTAL FEMUR FRACTURE      COLONOSCOPY      COSMETIC SURGERY      HIP ARTHROPLASTY Right 1/2/2020    Procedure: REVISION TOTAL HIP ARTHROPLASTY WITH REPAIR OF PARIPROSTHETIC FRACTURE - POSTERIOR APPROACH;  Surgeon: Jina Hooper MD;  [facility];  Service: Orthopedics    OR TOTAL HIP ARTHROPLASTY Right 12/11/2019    Procedure: ARTHROPLASTY HIP TOTAL ANTERIOR; Surgeon: Ivanna Lopes MD;  [facility];  Service: Orthopedics    RHINOPLASTY      SINUS SURGERY         Current Outpatient Medications   Medication Sig Dispense Refill    ferrous sulfate 325 (65 Fe) mg tablet Take 1 tablet (325 mg total) by mouth 2 (two) times a day with meals 60 tablet 1    levothyroxine 25 mcg tablet Take 1 tablet (25 mcg total) by mouth daily 90 tablet 3    LORazepam (ATIVAN) 2 mg tablet Take 1 tablet (2 mg total) by mouth every 8 (eight) hours as needed for anxiety 90 tablet 1    meclizine (ANTIVERT) 25 mg tablet Take 1 tablet (25 mg total) by mouth 3 (three) times a day as needed for dizziness 30 tablet 0    Multiple Vitamin (MULTIVITAMIN) capsule Take 1 capsule by mouth daily 30 capsule 0    omeprazole (PriLOSEC) 40 MG capsule Take 1 capsule (40 mg total) by mouth 2 (two) times a day 180 capsule 3    ondansetron (ZOFRAN-ODT) 4 mg disintegrating tablet Take 1 tablet (4 mg total) by mouth every 6 (six) hours as needed for nausea or vomiting 20 tablet 0    PARoxetine (PAXIL) 30 mg tablet Take 2 tablets (60 mg total) by mouth daily 180 tablet 3    QUEtiapine (SEROquel) 400 MG tablet Take 1 tablet (400 mg total) by mouth daily at bedtime 90 tablet 3    triamcinolone (KENALOG) 0 1 % cream Apply 1 application topically 2 (two) times a day To affected area 80 g 2    ziprasidone (GEODON) 80 mg capsule TAKE 1 CAPSULE BY MOUTH EVERY DAY 90 capsule 1    sucralfate (CARAFATE) 1 g/10 mL suspension Take 10 mL (1 g total) by mouth 4 (four) times a day for 5 days 200 mL 5     No current facility-administered medications for this visit  Allergies   Allergen Reactions    Risperdal [Risperidone] Shortness Of Breath     Rapid heart beat, SOB    Zyprexa [Olanzapine] Shortness Of Breath     Rapid heartbeat    Latex Rash     Band aids, adhesives wears normal underwear, can eat bananas  USE PAPER TAPE       Review of Systems   Constitutional: Positive for fatigue   Negative for chills and fever  HENT: Negative for congestion, nosebleeds, postnasal drip, sore throat and trouble swallowing  Eyes: Negative for pain  Respiratory: Negative for cough, chest tightness, shortness of breath and wheezing  Cardiovascular: Negative for chest pain, palpitations and leg swelling  Gastrointestinal: Positive for constipation  Negative for abdominal pain, diarrhea, nausea and vomiting  Endocrine: Negative for polydipsia and polyuria  Genitourinary: Negative for dysuria, flank pain and hematuria  Musculoskeletal: Negative for arthralgias  Skin: Negative for rash  Neurological: Negative for dizziness, tremors, light-headedness and headaches  Hematological: Does not bruise/bleed easily  Psychiatric/Behavioral: Positive for dysphoric mood  Negative for confusion  The patient is nervous/anxious  Video Exam    Vitals:    01/12/21 0909   Weight: 69 9 kg (154 lb)       Physical Exam     I spent 20 minutes with patient today in which greater than 50% of the time was spent in counseling/coordination of care regarding acute and chronic issues  It was my intent to perform this visit via video technology but the patient was not able to do a video connection so the visit was completed via audio telephone only  VIRTUAL VISIT DISCLAIMER    Mistijose Deleon acknowledges that she has consented to an online visit or consultation  She understands that the online visit is based solely on information provided by her, and that, in the absence of a face-to-face physical evaluation by the physician, the diagnosis she receives is both limited and provisional in terms of accuracy and completeness  This is not intended to replace a full medical face-to-face evaluation by the physician  Misti Deleon understands and accepts these terms

## 2021-01-13 ENCOUNTER — TELEPHONE (OUTPATIENT)
Dept: HEMATOLOGY ONCOLOGY | Facility: CLINIC | Age: 61
End: 2021-01-13

## 2021-01-13 NOTE — TELEPHONE ENCOUNTER
Patient called to cancel appt with Sasha Mijares on 1-21-21 @ 8:20am  Patient will call back to reschedule   Patient stated her mother has a lot of appointments she need's to take care of first

## 2021-01-22 DIAGNOSIS — K92.0 HEMATEMESIS: ICD-10-CM

## 2021-01-22 DIAGNOSIS — R10.13 EPIGASTRIC PAIN: ICD-10-CM

## 2021-01-22 DIAGNOSIS — K22.10 EROSIVE ESOPHAGITIS: ICD-10-CM

## 2021-01-22 DIAGNOSIS — Z20.822 EXPOSURE TO COVID-19 VIRUS: Primary | ICD-10-CM

## 2021-01-22 RX ORDER — ONDANSETRON 4 MG/1
4 TABLET, ORALLY DISINTEGRATING ORAL EVERY 6 HOURS PRN
Qty: 20 TABLET | Refills: 3 | Status: SHIPPED | OUTPATIENT
Start: 2021-01-22 | End: 2021-04-26 | Stop reason: SDUPTHER

## 2021-01-23 DIAGNOSIS — Z20.822 EXPOSURE TO COVID-19 VIRUS: ICD-10-CM

## 2021-01-23 PROCEDURE — U0005 INFEC AGEN DETEC AMPLI PROBE: HCPCS | Performed by: INTERNAL MEDICINE

## 2021-01-23 PROCEDURE — U0003 INFECTIOUS AGENT DETECTION BY NUCLEIC ACID (DNA OR RNA); SEVERE ACUTE RESPIRATORY SYNDROME CORONAVIRUS 2 (SARS-COV-2) (CORONAVIRUS DISEASE [COVID-19]), AMPLIFIED PROBE TECHNIQUE, MAKING USE OF HIGH THROUGHPUT TECHNOLOGIES AS DESCRIBED BY CMS-2020-01-R: HCPCS | Performed by: INTERNAL MEDICINE

## 2021-01-24 LAB — SARS-COV-2 N GENE RESP QL NAA+PROBE: NEGATIVE

## 2021-01-26 ENCOUNTER — TELEMEDICINE (OUTPATIENT)
Dept: INTERNAL MEDICINE CLINIC | Facility: CLINIC | Age: 61
End: 2021-01-26
Payer: COMMERCIAL

## 2021-01-26 VITALS — WEIGHT: 154 LBS | BODY MASS INDEX: 27.29 KG/M2 | HEIGHT: 63 IN

## 2021-01-26 DIAGNOSIS — F41.9 ANXIETY: ICD-10-CM

## 2021-01-26 DIAGNOSIS — F41.1 GAD (GENERALIZED ANXIETY DISORDER): Chronic | ICD-10-CM

## 2021-01-26 DIAGNOSIS — K22.10 EROSIVE ESOPHAGITIS: Primary | ICD-10-CM

## 2021-01-26 PROCEDURE — 1036F TOBACCO NON-USER: CPT | Performed by: INTERNAL MEDICINE

## 2021-01-26 PROCEDURE — 3008F BODY MASS INDEX DOCD: CPT | Performed by: INTERNAL MEDICINE

## 2021-01-26 PROCEDURE — 99214 OFFICE O/P EST MOD 30 MIN: CPT | Performed by: INTERNAL MEDICINE

## 2021-01-26 RX ORDER — LORAZEPAM 2 MG/1
2 TABLET ORAL EVERY 6 HOURS PRN
Qty: 120 TABLET | Refills: 0 | Status: SHIPPED | OUTPATIENT
Start: 2021-01-26 | End: 2021-03-15 | Stop reason: SDUPTHER

## 2021-01-26 NOTE — PROGRESS NOTES
Virtual Regular Visit      Assessment/Plan:    Problem List Items Addressed This Visit        Digestive    Erosive esophagitis - Primary     I recommend rechecking CBC and CMP, discussed going to emergency room if worsening symptoms  I also recommend follow-up GI         Relevant Orders    CBC and differential    Comprehensive metabolic panel       Other    ABIMAEL (generalized anxiety disorder) (Chronic)     Follow-up Psychiatry         Relevant Medications    LORazepam (ATIVAN) 2 mg tablet    Anxiety     Follow-up Psychiatry, I am hesitant to increase the Ativan if she is already on 6 mg total a day, and she wants increase it to 8 mg total per day, discussed risks of medication including central nervous system depression, and respiratory depression  I am not sure what additional benefit she would get from an additional 2 mg per day since she is already on a high dose  I refilled prescription, encouraged patient to just use the extra pill as needed, and try to titrate down the medication  Relevant Medications    LORazepam (ATIVAN) 2 mg tablet               Reason for visit is   Chief Complaint   Patient presents with    Virtual Regular Visit        Encounter provider Wilda Pedersen MD    Provider located at [address] [place] [phone]      Recent Visits  No visits were found meeting these conditions  Showing recent visits within past 7 days and meeting all other requirements     Today's Visits  Date Type Provider Dept   01/26/21 Telemedicine Wilda Pedersen MD [facility] today's visits and meeting all other requirements     Future Appointments  No visits were found meeting these conditions  Showing future appointments within next 150 days and meeting all other requirements        The patient was identified by name and date of birth   Michelle Stokes was informed that this is a telemedicine visit and that the visit is being conducted through [employer] [facility] and patient was informed that this is a secure, HIPAA-compliant platform  She agrees to proceed     My office door was closed  No one else was in the room  She acknowledged consent and understanding of privacy and security of the video platform  The patient has agreed to participate and understands they can discontinue the visit at any time  Patient is aware this is a billable service  Subjective  Rajeve Lucero is a 61 y o  female    Pt has pain in her lower esophagus, is vomiting up blood small amounts of dark blood, and small amount of coffee ground emesis  Yesterday she vomited about 5-6 times  She hasn't vomited today  She feels lightheaded, some blurry vision, denies feeling like she is going to pass out  She has fatigue and wants to lie down, but when she lies down, he mother starts yelling at her calling her lazy  Pt reports her mother has been yelling at her, pushing her with her walker  Pt reports her mother rings a bell whenever she wants her to do something for her, and pt reports that her mother is breaking her spirit  No CP, no SOB  She is asking for Ativan 2 mg every 6 hours instead of every 8 hours due to her increased stress         Past Medical History:   Diagnosis Date    Anxiety     Back pain at L4-L5 level     Schreiber esophagus     Bulimia     Colon polyp     Disease of thyroid gland     hypothyroid    Ear problems     GERD (gastroesophageal reflux disease)     History of transfusion     Hyperlipidemia     Hypothyroidism     Leukopenia     Nasal congestion     NMS (neuroleptic malignant syndrome) 01/03/2020    Rheumatoid arthritis involving right hip (HCC)     Sciatica     Seizure ([address] )     due to medication mix up 8/2018 only one historically    Synovial cyst of lumbar spine     Vertigo     Weight gain        Past Surgical History:   Procedure Laterality Date    BONE MARROW BIOPSY      BREAST SURGERY enlargement     CLOSED REDUCTION DISTAL FEMUR FRACTURE      COLONOSCOPY      COSMETIC SURGERY      HIP ARTHROPLASTY Right 1/2/2020    Procedure: REVISION TOTAL HIP ARTHROPLASTY WITH REPAIR OF PARIPROSTHETIC FRACTURE - POSTERIOR APPROACH;  Surgeon: Renny Montgomery MD;  [facility];  Service: Orthopedics    [place] TOTAL HIP ARTHROPLASTY Right 12/11/2019    Procedure: ARTHROPLASTY HIP TOTAL ANTERIOR;  Surgeon: Renny Montgomery MD;  [facility];  Service: Orthopedics    RHINOPLASTY      SINUS SURGERY         Current Outpatient Medications   Medication Sig Dispense Refill    ferrous sulfate 325 (65 Fe) mg tablet Take 1 tablet (325 mg total) by mouth 2 (two) times a day with meals 60 tablet 1    levothyroxine 25 mcg tablet Take 1 tablet (25 mcg total) by mouth daily 90 tablet 3    LORazepam (ATIVAN) 2 mg tablet Take 1 tablet (2 mg total) by mouth every 6 (six) hours as needed for anxiety 120 tablet 0    meclizine (ANTIVERT) 25 mg tablet Take 1 tablet (25 mg total) by mouth 3 (three) times a day as needed for dizziness 30 tablet 0    Multiple Vitamin (MULTIVITAMIN) capsule Take 1 capsule by mouth daily 30 capsule 0    omeprazole (PriLOSEC) 40 MG capsule Take 1 capsule (40 mg total) by mouth 2 (two) times a day 180 capsule 3    ondansetron (ZOFRAN-ODT) 4 mg disintegrating tablet Take 1 tablet (4 mg total) by mouth every 6 (six) hours as needed for nausea or vomiting 20 tablet 3    PARoxetine (PAXIL) 30 mg tablet Take 2 tablets (60 mg total) by mouth daily 180 tablet 3    QUEtiapine (SEROquel) 400 MG tablet Take 1 tablet (400 mg total) by mouth daily at bedtime 90 tablet 3    triamcinolone (KENALOG) 0 1 % cream Apply 1 application topically 2 (two) times a day To affected area 80 g 2    ziprasidone (GEODON) 80 mg capsule TAKE 1 CAPSULE BY MOUTH EVERY DAY 90 capsule 1    sucralfate (CARAFATE) 1 g/10 mL suspension Take 10 mL (1 g total) by mouth 4 (four) times a day for 5 days 200 mL 5     No current facility-administered medications for this visit  Allergies   Allergen Reactions    Risperdal [Risperidone] Shortness Of Breath     Rapid heart beat, SOB    Zyprexa [Olanzapine] Shortness Of Breath     Rapid heartbeat    Latex Rash     Band aids, adhesives wears normal underwear, can eat bananas  USE PAPER TAPE       Review of Systems    Video Exam    Vitals:    01/26/21 0924   Weight: 69 9 kg (154 lb)   Height: 5' 3" (1 6 m)       Physical Exam     I spent 25 minutes with patient today in which greater than 50% of the time was spent in counseling/coordination of care regarding acute and chronic issues    It was my intent to perform this visit via video technology but the patient was not able to do a video connection so the visit was completed via audio telephone only  VIRTUAL VISIT DISCLAIMER    Estevan Martinez acknowledges that she has consented to an online visit or consultation  She understands that the online visit is based solely on information provided by her, and that, in the absence of a face-to-face physical evaluation by the physician, the diagnosis she receives is both limited and provisional in terms of accuracy and completeness  This is not intended to replace a full medical face-to-face evaluation by the physician  Estevan Martinez understands and accepts these terms

## 2021-01-26 NOTE — PATIENT INSTRUCTIONS
Problem List Items Addressed This Visit        Digestive    Erosive esophagitis - Primary     I recommend rechecking CBC and CMP, discussed going to emergency room if worsening symptoms  I also recommend follow-up GI         Relevant Orders    CBC and differential    Comprehensive metabolic panel       Other    ABIMAEL (generalized anxiety disorder) (Chronic)     Follow-up Psychiatry         Relevant Medications    LORazepam (ATIVAN) 2 mg tablet    Anxiety     Follow-up Psychiatry, I am hesitant to increase the Ativan if she is already on 6 mg total a day, and she wants increase it to 8 mg total per day, discussed risks of medication including central nervous system depression, and respiratory depression  I am not sure what additional benefit she would get from an additional 2 mg per day since she is already on a high dose  I refilled prescription, encouraged patient to just use the extra pill as needed, and try to titrate down the medication           Relevant Medications    LORazepam (ATIVAN) 2 mg tablet

## 2021-01-26 NOTE — ASSESSMENT & PLAN NOTE
I recommend rechecking CBC and CMP, discussed going to emergency room if worsening symptoms   I also recommend follow-up GI

## 2021-01-26 NOTE — ASSESSMENT & PLAN NOTE
Follow-up Psychiatry, I am hesitant to increase the Ativan if she is already on 6 mg total a day, and she wants increase it to 8 mg total per day, discussed risks of medication including central nervous system depression, and respiratory depression  I am not sure what additional benefit she would get from an additional 2 mg per day since she is already on a high dose  I refilled prescription, encouraged patient to just use the extra pill as needed, and try to titrate down the medication

## 2021-02-09 ENCOUNTER — TELEPHONE (OUTPATIENT)
Dept: GASTROENTEROLOGY | Facility: CLINIC | Age: 61
End: 2021-02-09

## 2021-02-09 NOTE — TELEPHONE ENCOUNTER
I lmom informing that procedure at [facility] on 2/16/21 had to be cxd due to her insurance and offered her Mon 2/22/21 at [facility] instead with Dr Nixon Dodson  Will call pt again in one week if do not hear back from her

## 2021-02-09 NOTE — TELEPHONE ENCOUNTER
----- Message from Genevieve Jesus sent at 2/8/2021  2:27 PM EST -----  Regarding: RESCHEDULE  Pedro Luis Yeager  Please reschedule this pt   We are non par and I havn't gotten any new updates to keep these pts on our schedule    Thanks cg

## 2021-02-11 DIAGNOSIS — F25.1 SCHIZOAFFECTIVE DISORDER, DEPRESSIVE TYPE (HCC): Chronic | ICD-10-CM

## 2021-02-11 RX ORDER — QUETIAPINE FUMARATE 400 MG/1
400 TABLET, FILM COATED ORAL
Qty: 90 TABLET | Refills: 3 | Status: SHIPPED | OUTPATIENT
Start: 2021-02-11 | End: 2021-05-19 | Stop reason: SDUPTHER

## 2021-02-12 DIAGNOSIS — L23.9 DERMAL HYPERSENSITIVITY REACTION: ICD-10-CM

## 2021-02-12 RX ORDER — TRIAMCINOLONE ACETONIDE 1 MG/G
1 CREAM TOPICAL 2 TIMES DAILY
Qty: 80 G | Refills: 2 | Status: SHIPPED | OUTPATIENT
Start: 2021-02-12 | End: 2021-02-22 | Stop reason: SDUPTHER

## 2021-02-22 ENCOUNTER — ANESTHESIA EVENT (OUTPATIENT)
Dept: GASTROENTEROLOGY | Facility: HOSPITAL | Age: 61
End: 2021-02-22

## 2021-02-22 ENCOUNTER — HOSPITAL ENCOUNTER (OUTPATIENT)
Dept: GASTROENTEROLOGY | Facility: HOSPITAL | Age: 61
Setting detail: OUTPATIENT SURGERY
Discharge: HOME | End: 2021-02-22
Admitting: ANESTHESIOLOGY
Payer: COMMERCIAL

## 2021-02-22 ENCOUNTER — ANESTHESIA (OUTPATIENT)
Dept: GASTROENTEROLOGY | Facility: HOSPITAL | Age: 61
End: 2021-02-22

## 2021-02-22 VITALS — HEART RATE: 86 BPM

## 2021-02-22 VITALS
OXYGEN SATURATION: 98 % | TEMPERATURE: 98.1 F | RESPIRATION RATE: 16 BRPM | HEART RATE: 83 BPM | DIASTOLIC BLOOD PRESSURE: 60 MMHG | SYSTOLIC BLOOD PRESSURE: 113 MMHG

## 2021-02-22 DIAGNOSIS — K22.10 EROSIVE ESOPHAGITIS: ICD-10-CM

## 2021-02-22 DIAGNOSIS — L23.9 DERMAL HYPERSENSITIVITY REACTION: ICD-10-CM

## 2021-02-22 DIAGNOSIS — K44.9 HIATAL HERNIA WITH GASTROESOPHAGEAL REFLUX DISEASE AND ESOPHAGITIS: ICD-10-CM

## 2021-02-22 DIAGNOSIS — K21.00 HIATAL HERNIA WITH GASTROESOPHAGEAL REFLUX DISEASE AND ESOPHAGITIS: ICD-10-CM

## 2021-02-22 PROCEDURE — 88312 SPECIAL STAINS GROUP 1: CPT | Performed by: PATHOLOGY

## 2021-02-22 PROCEDURE — 88305 TISSUE EXAM BY PATHOLOGIST: CPT | Performed by: PATHOLOGY

## 2021-02-22 PROCEDURE — 88342 IMHCHEM/IMCYTCHM 1ST ANTB: CPT | Performed by: PATHOLOGY

## 2021-02-22 PROCEDURE — 43239 EGD BIOPSY SINGLE/MULTIPLE: CPT | Performed by: INTERNAL MEDICINE

## 2021-02-22 RX ORDER — SODIUM CHLORIDE, SODIUM LACTATE, POTASSIUM CHLORIDE, CALCIUM CHLORIDE 600; 310; 30; 20 MG/100ML; MG/100ML; MG/100ML; MG/100ML
125 INJECTION, SOLUTION INTRAVENOUS CONTINUOUS
Status: DISCONTINUED | OUTPATIENT
Start: 2021-02-22 | End: 2021-02-26 | Stop reason: HOSPADM

## 2021-02-22 RX ORDER — PROPOFOL 10 MG/ML
INJECTION, EMULSION INTRAVENOUS AS NEEDED
Status: DISCONTINUED | OUTPATIENT
Start: 2021-02-22 | End: 2021-02-22

## 2021-02-22 RX ADMIN — PROPOFOL 20 MG: 10 INJECTION, EMULSION INTRAVENOUS at 12:10

## 2021-02-22 RX ADMIN — PROPOFOL 100 MG: 10 INJECTION, EMULSION INTRAVENOUS at 12:08

## 2021-02-22 RX ADMIN — LIDOCAINE HYDROCHLORIDE 60 MG: 20 INJECTION, SOLUTION INTRAVENOUS at 12:08

## 2021-02-22 RX ADMIN — SODIUM CHLORIDE, SODIUM LACTATE, POTASSIUM CHLORIDE, AND CALCIUM CHLORIDE: .6; .31; .03; .02 INJECTION, SOLUTION INTRAVENOUS at 12:05

## 2021-02-22 NOTE — H&P
History and Physical - [facility]  Bobby Grover 61 y o  female MRN: [id number]                  HPI: Bobby Grover is a 61y o  year old female who presents for abdominal pain       REVIEW OF SYSTEMS: Per the HPI, and otherwise unremarkable      Historical Information   Past Medical History:   Diagnosis Date    Anxiety     Back pain at L4-L5 level     Schreiber esophagus     Bulimia     Colon polyp     Disease of thyroid gland     hypothyroid    Ear problems     GERD (gastroesophageal reflux disease)     History of transfusion     Hyperlipidemia     Hypothyroidism     Leukopenia     Nasal congestion     NMS (neuroleptic malignant syndrome) 01/03/2020    Rheumatoid arthritis involving right hip (HCC)     Sciatica     Seizure ([address] )     due to medication mix up 8/2018 only one historically    Synovial cyst of lumbar spine     Vertigo     Weight gain      Past Surgical History:   Procedure Laterality Date    BONE MARROW BIOPSY      BREAST SURGERY      enlargement     CLOSED REDUCTION DISTAL FEMUR FRACTURE      COLONOSCOPY      COSMETIC SURGERY      HIP ARTHROPLASTY Right 1/2/2020    Procedure: REVISION TOTAL HIP ARTHROPLASTY WITH REPAIR OF PARIPROSTHETIC FRACTURE - POSTERIOR APPROACH;  Surgeon: Renny Montgomery MD;  [facility];  Service: Orthopedics    [place] TOTAL HIP ARTHROPLASTY Right 12/11/2019    Procedure: ARTHROPLASTY HIP TOTAL ANTERIOR;  Surgeon: Renny Montgomery MD;  [facility];  Service: Orthopedics    RHINOPLASTY      SINUS SURGERY       Social History   Social History     Substance and Sexual Activity   Alcohol Use Never    Frequency: Never    Binge frequency: Never     Social History     Substance and Sexual Activity   Drug Use No     Social History     Tobacco Use   Smoking Status Never Smoker   Smokeless Tobacco Never Used     Family History   Problem Relation Age of Onset    Uterine cancer Mother     Esophageal cancer Father  Colon cancer Maternal Grandmother        Meds/Allergies     (Not in a hospital admission)      Allergies   Allergen Reactions    Risperdal [Risperidone] Shortness Of Breath     Rapid heart beat, SOB    Zyprexa [Olanzapine] Shortness Of Breath     Rapid heartbeat    Latex Rash     Band aids, adhesives wears normal underwear, can eat bananas  USE PAPER TAPE       Objective     /71   Pulse 92   Temp 98 5 °F (36 9 °C) (Temporal)   Resp 18   SpO2 94%       PHYSICAL EXAM    Gen: NAD  CV: RRR  CHEST: Clear  ABD: soft, NT/ND  EXT: no edema      ASSESSMENT/PLAN:  This is a 61y o  year old female here for egd, and she is stable and optimized for her procedure

## 2021-02-22 NOTE — ANESTHESIA POSTPROCEDURE EVALUATION
Post-Op Assessment Note    CV Status:  Stable  Pain Score: 0    Pain management: adequate     Mental Status:  Alert and awake   Hydration Status:  Euvolemic   PONV Controlled:  Controlled   Airway Patency:  Patent      Post Op Vitals Reviewed: Yes      Staff: CRNA         No complications documented      BP   107/60   Temp      Pulse  82   Resp   17   SpO2   98%

## 2021-02-22 NOTE — ANESTHESIA PREPROCEDURE EVALUATION
Procedure:  EGD    Relevant Problems   CARDIO   (+) Essential hypertension      ENDO   (+) Acquired hypothyroidism      GI/HEPATIC   (+) Erosive esophagitis   (+) Gastroesophageal reflux disease with esophagitis without hemorrhage   (+) Hiatal hernia with gastroesophageal reflux disease and esophagitis      HEMATOLOGY   (+) Iron deficiency anemia due to chronic blood loss   (+) Symptomatic anemia      MUSCULOSKELETAL   (+) Chronic bilateral low back pain without sciatica   (+) DDD (degenerative disc disease), lumbar   (+) Hiatal hernia with gastroesophageal reflux disease and esophagitis   (+) Lumbar spondylosis      NEURO/PSYCH   (+) Anxiety   (+) ABIMAEL (generalized anxiety disorder)   (+) Schizoaffective disorder, depressive type (HCC)        Physical Exam    Airway    Mallampati score: III    Neck ROM: full     Dental       Cardiovascular  Rhythm: regular, Rate: normal, Cardiovascular exam normal    Pulmonary  Pulmonary exam normal Breath sounds clear to auscultation,     Other Findings        Anesthesia Plan  ASA Score- 2     Anesthesia Type- IV sedation with anesthesia with ASA Monitors  Additional Monitors:   Airway Plan:           Plan Factors-Exercise tolerance (METS): >4 METS  Chart reviewed  EKG reviewed  Existing labs reviewed  Patient is not a current smoker  Patient not instructed to abstain from smoking on day of procedure  Patient smoked on day of surgery  Induction- intravenous  Postoperative Plan-     Informed Consent- Anesthetic plan and risks discussed with patient  I personally reviewed this patient with the CRNA  Discussed and agreed on the Anesthesia Plan with the CHETAN Jonesole

## 2021-02-23 ENCOUNTER — TELEPHONE (OUTPATIENT)
Dept: GASTROENTEROLOGY | Facility: CLINIC | Age: 61
End: 2021-02-23

## 2021-02-23 RX ORDER — TRIAMCINOLONE ACETONIDE 1 MG/G
1 CREAM TOPICAL 2 TIMES DAILY
Qty: 80 G | Refills: 5 | Status: SHIPPED | OUTPATIENT
Start: 2021-02-23 | End: 2021-04-12 | Stop reason: SDUPTHER

## 2021-02-24 DIAGNOSIS — R10.13 EPIGASTRIC PAIN: ICD-10-CM

## 2021-02-24 DIAGNOSIS — K22.10 EROSIVE ESOPHAGITIS: ICD-10-CM

## 2021-02-24 DIAGNOSIS — K92.0 HEMATEMESIS: ICD-10-CM

## 2021-02-24 RX ORDER — SUCRALFATE ORAL 1 G/10ML
1 SUSPENSION ORAL 4 TIMES DAILY
Qty: 420 ML | Refills: 3 | Status: SHIPPED | OUTPATIENT
Start: 2021-02-24 | End: 2021-05-14 | Stop reason: HOSPADM

## 2021-02-24 RX ORDER — SUCRALFATE ORAL 1 G/10ML
1 SUSPENSION ORAL 4 TIMES DAILY
Qty: 200 ML | Refills: 5 | Status: SHIPPED | OUTPATIENT
Start: 2021-02-24 | End: 2021-02-24

## 2021-03-01 ENCOUNTER — DOCUMENTATION (OUTPATIENT)
Dept: INTERNAL MEDICINE CLINIC | Facility: CLINIC | Age: 61
End: 2021-03-01

## 2021-03-01 NOTE — PROGRESS NOTES
The patient Is applying for housing and has faxed over forms  The forms have been copied and filed in the drawer at registrations  The forms have been given to [facility]

## 2021-03-03 DIAGNOSIS — F41.1 GAD (GENERALIZED ANXIETY DISORDER): Chronic | ICD-10-CM

## 2021-03-03 RX ORDER — ZIPRASIDONE HYDROCHLORIDE 80 MG/1
CAPSULE ORAL
Qty: 90 CAPSULE | Refills: 1 | Status: SHIPPED | OUTPATIENT
Start: 2021-03-03 | End: 2021-05-19 | Stop reason: SDUPTHER

## 2021-03-10 DIAGNOSIS — Z23 ENCOUNTER FOR IMMUNIZATION: ICD-10-CM

## 2021-03-15 DIAGNOSIS — F41.9 ANXIETY: ICD-10-CM

## 2021-03-15 RX ORDER — LORAZEPAM 2 MG/1
2 TABLET ORAL EVERY 6 HOURS PRN
Qty: 120 TABLET | Refills: 0 | Status: SHIPPED | OUTPATIENT
Start: 2021-03-15 | End: 2021-04-16 | Stop reason: SDUPTHER

## 2021-03-19 ENCOUNTER — OFFICE VISIT (OUTPATIENT)
Dept: GASTROENTEROLOGY | Facility: CLINIC | Age: 61
End: 2021-03-19
Payer: COMMERCIAL

## 2021-03-19 ENCOUNTER — PREP FOR PROCEDURE (OUTPATIENT)
Dept: GASTROENTEROLOGY | Facility: CLINIC | Age: 61
End: 2021-03-19

## 2021-03-19 VITALS
SYSTOLIC BLOOD PRESSURE: 145 MMHG | DIASTOLIC BLOOD PRESSURE: 95 MMHG | HEIGHT: 63 IN | HEART RATE: 78 BPM | BODY MASS INDEX: 29.06 KG/M2 | WEIGHT: 164 LBS

## 2021-03-19 DIAGNOSIS — K20.90 ESOPHAGITIS: Primary | ICD-10-CM

## 2021-03-19 DIAGNOSIS — K21.00 GASTROESOPHAGEAL REFLUX DISEASE WITH ESOPHAGITIS, UNSPECIFIED WHETHER HEMORRHAGE: ICD-10-CM

## 2021-03-19 DIAGNOSIS — K22.10 ULCER OF ESOPHAGUS WITHOUT BLEEDING: ICD-10-CM

## 2021-03-19 DIAGNOSIS — K44.9 HIATAL HERNIA: Primary | ICD-10-CM

## 2021-03-19 DIAGNOSIS — K63.5 POLYP OF COLON, UNSPECIFIED PART OF COLON, UNSPECIFIED TYPE: ICD-10-CM

## 2021-03-19 PROCEDURE — 99214 OFFICE O/P EST MOD 30 MIN: CPT | Performed by: INTERNAL MEDICINE

## 2021-03-19 PROCEDURE — 3008F BODY MASS INDEX DOCD: CPT | Performed by: INTERNAL MEDICINE

## 2021-03-19 PROCEDURE — 1036F TOBACCO NON-USER: CPT | Performed by: INTERNAL MEDICINE

## 2021-03-19 RX ORDER — FAMOTIDINE 40 MG/1
40 TABLET, FILM COATED ORAL
Qty: 90 TABLET | Refills: 1 | Status: SHIPPED | OUTPATIENT
Start: 2021-03-19 | End: 2021-05-14 | Stop reason: HOSPADM

## 2021-03-19 NOTE — PATIENT INSTRUCTIONS
· Continue Prilosec 40 mg twice daily before breakfast and dinner  · Start Pepcid 40 mg daily at bedtime  · Continue Carafate 1 g 4 times daily  · Continue anti-reflux diet  · Follow-up with general surgeon regarding hiatal hernia repair  · You will also need TIF procedure to tight lower esophageal sphincter     Diet for Stomach Ulcers and Gastritis   WHAT YOU NEED TO KNOW:   A diet for stomach ulcers and gastritis is a meal plan that limits foods that irritate your stomach  Certain foods may worsen symptoms such as stomach pain, bloating, heartburn, or indigestion  DISCHARGE INSTRUCTIONS:   Foods to limit or avoid:  You may need to avoid acidic, spicy, or high-fat foods  Not all foods affect everyone the same way  You will need to learn which foods worsen your symptoms and limit those foods  The following are some foods that may worsen ulcer or gastritis symptoms:  · Beverages:      ? Whole milk and chocolate milk    ? Hot cocoa and cola    ? Any beverage with caffeine    ? Regular and decaffeinated coffee    ? Peppermint and spearmint tea    ? Green and black tea, with or without caffeine    ? Orange and grapefruit juices    ? Drinks that contain alcohol    · Spices and seasonings:      ? Black and red pepper    ? Chili powder    ? Mustard seed and nutmeg    · Other foods:      ? Dairy foods made from whole milk or cream    ? Chocolate    ? Spicy or strongly flavored cheeses, such as jalapeno or black pepper    ? Highly seasoned, high-fat meats, such as sausage, salami, barron, ham, and cold cuts    ? Hot chiles and peppers    ? Tomato products, such as tomato paste, tomato sauce, or tomato juice    Foods to include:  Eat a variety of healthy foods from all the food groups  Eat fruits, vegetables, whole grains, and fat-free or low-fat dairy foods  Whole grains include whole-wheat breads, cereals, pasta, and brown rice  Choose lean meats, poultry (chicken and turkey), fish, beans, eggs, and nuts   A healthy meal plan is low in unhealthy fats, salt, and added sugar  Healthy fats include olive oil and canola oil  Ask your dietitian for more information about a healthy diet  Other helpful guidelines:   · Do not eat right before bedtime  Stop eating at least 2 hours before bedtime  · Eat small, frequent meals  Your stomach may tolerate small, frequent meals better than large meals  © Copyright [address] Information is for End User's use only and may not be sold, redistributed or otherwise used for commercial purposes  All illustrations and images included in CareNotes® are the copyrighted property of [employer]  or [address] [place] [address]  The above information is an  only  It is not intended as medical advice for individual conditions or treatments  Talk to your doctor, nurse or pharmacist before following any medical regimen to see if it is safe and effective for you

## 2021-03-19 NOTE — PROGRESS NOTES
[facility] - Outpatient Follow-up Note  Snehal Morris 61 y o  female MRN: [id number]  Encounter: [id number]          ASSESSMENT AND PLAN:      1  Hiatal hernia  -     Ambulatory referral to General Surgery; Future  -     famotidine (PEPCID) 40 MG tablet; Take 1 tablet (40 mg total) by mouth daily at bedtime    2  Ulcer of esophagus without bleeding  -     Ambulatory referral to General Surgery; Future  -     famotidine (PEPCID) 40 MG tablet; Take 1 tablet (40 mg total) by mouth daily at bedtime    3  Gastroesophageal reflux disease with esophagitis, unspecified whether hemorrhage  -     Ambulatory referral to General Surgery; Future  -     famotidine (PEPCID) 40 MG tablet; Take 1 tablet (40 mg total) by mouth daily at bedtime    4  Polyp of colon, unspecified part of colon, unspecified type    61year old female with history of medium sized hiatal hernia with reflux refractory to maximum dose PPI therapy and grade D erosive esophagitis  Repeat EGD in February showed multiple lower esophageal ulcers with evidence of reflux esophagitis despite maximum dose PPI and Carafate  Recommend laparoscopic hiatal hernia repair with TIF  Pt discussed this with Dr Boss Beverage   -continue Omeprazole 40mg BID, will add Pepcid 40mg daily at HS  -Continue Carafate 1g QID  -Continue anti reflux diet  -referral placed for general surgery for hiatal hernia repair  -Will schedule TIF after hiatal hernia repair or coordinate procedure same day as hiatal hernia repair  -Recall placed for colonoscopy in September 2025 for history of colon polyp    ______________________________________________________________________    SUBJECTIVE:  This is a 61year old female presenting for follow up to EGD   She was hospitalized due to hematemesis, had EGD/Colonoscopy 9/24/20-Severe grade D esophagitis,Medium-sized hiatal hernia with no obvious Geo's erosions, Mild gastritis; she had 1 sessile polyp removed from transverse colon, but unfortunately polyp was not retrieved  She had redundant colon  Repeat EGD on 2/22/21 showed multiple lower esophageal ulcer with evidence of reflux esophagitis, medium size hiatal hernia, 4-5 cm in size, mild gastritis, and normal duodenum  Biopsies were negative for H Pyloril  Distal esophagus showed chronic inflammation and reactive changes  She has been taking Prilosec 40mg BID, Carafate suspension 1g QID, and eating a soft diet  She denies any further hematemesis, reflux, or abdominal pain  She continues to have reflux after each meal     Repeat Colonoscopy September 2025  REVIEW OF SYSTEMS IS OTHERWISE NEGATIVE        Historical Information   Past Medical History:   Diagnosis Date    Anxiety     Back pain at L4-L5 level     Schreiber esophagus     Bulimia     Colon polyp     Disease of thyroid gland     hypothyroid    Ear problems     GERD (gastroesophageal reflux disease)     History of transfusion     Hyperlipidemia     Hypothyroidism     Leukopenia     Nasal congestion     NMS (neuroleptic malignant syndrome) 01/03/2020    Rheumatoid arthritis involving right hip (HCC)     Sciatica     Seizure ([address] )     due to medication mix up 8/2018 only one historically    Synovial cyst of lumbar spine     Vertigo     Weight gain      Past Surgical History:   Procedure Laterality Date    BONE MARROW BIOPSY      BREAST SURGERY      enlargement     CLOSED REDUCTION DISTAL FEMUR FRACTURE      COLONOSCOPY      COSMETIC SURGERY      HIP ARTHROPLASTY Right 1/2/2020    Procedure: REVISION TOTAL HIP ARTHROPLASTY WITH REPAIR OF PARIPROSTHETIC FRACTURE - POSTERIOR APPROACH;  Surgeon: Yaneli Rod MD;  [facility]: [facility];  Service: Orthopedics    DC TOTAL HIP ARTHROPLASTY Right 12/11/2019    Procedure: ARTHROPLASTY HIP TOTAL ANTERIOR;  Surgeon: Yaneli Rod MD;  Location: [facility];  Service: Orthopedics    RHINOPLASTY      SINUS SURGERY       Social History Social History     Substance and Sexual Activity   Alcohol Use Never    Frequency: Never    Binge frequency: Never     Social History     Substance and Sexual Activity   Drug Use No     Social History     Tobacco Use   Smoking Status Never Smoker   Smokeless Tobacco Never Used     Family History   Problem Relation Age of Onset    Uterine cancer Mother     Esophageal cancer Father     Colon cancer Maternal Grandmother        Meds/Allergies       Current Outpatient Medications:     ferrous sulfate 325 (65 Fe) mg tablet    levothyroxine 25 mcg tablet    LORazepam (ATIVAN) 2 mg tablet    meclizine (ANTIVERT) 25 mg tablet    Multiple Vitamin (MULTIVITAMIN) capsule    omeprazole (PriLOSEC) 40 MG capsule    ondansetron (ZOFRAN-ODT) 4 mg disintegrating tablet    PARoxetine (PAXIL) 30 mg tablet    QUEtiapine (SEROquel) 400 MG tablet    sucralfate (CARAFATE) 1 g/10 mL suspension    triamcinolone (KENALOG) 0 1 % cream    ziprasidone (GEODON) 80 mg capsule    famotidine (PEPCID) 40 MG tablet    Allergies   Allergen Reactions    Risperdal [Risperidone] Shortness Of Breath     Rapid heart beat, SOB    Zyprexa [Olanzapine] Shortness Of Breath     Rapid heartbeat    Latex Rash     Band aids, adhesives wears normal underwear, can eat bananas  USE PAPER TAPE           Objective     Blood pressure 145/95, pulse 78, height 5' 3" (1 6 m), weight 74 4 kg (164 lb), not currently breastfeeding  Body mass index is 29 05 kg/m²  PHYSICAL EXAM:      General Appearance:   Alert, cooperative, no distress; very flat affect   HEENT:   Normocephalic, atraumatic, anicteric  Neck:  Supple, symmetrical, trachea midline   Lungs:   Clear to auscultation bilaterally; no rales, rhonchi or wheezing; respirations unlabored    Heart[de-identified]   Regular rate and rhythm; no murmur     Abdomen:   Soft, non-tender, non-distended; normal bowel sounds; no masses, no organomegaly    Genitalia:   Deferred    Rectal:   Deferred    Extremities: No cyanosis, clubbing or edema    Skin:  No jaundice, rashes, or lesions    Lymph nodes:  No palpable cervical lymphadenopathy        Lab Results:   No visits with results within 1 Day(s) from this visit  Latest known visit with results is:   Hospital Outpatient Visit on 02/22/2021   Component Date Value    Case Report 02/22/2021                      Value:Surgical Pathology Report                         Case: [id number]                                   Authorizing Provider:  Shanel Larsen MD           Collected:           02/22/2021 1209              Ordering Location:     [place] [address]   Received:            02/22/2021 1600                                     Endoscopy                                                                    Pathologist:           Veva Sandifer, DO                                                     Specimens:   A) - Stomach, bx antrum                                                                             B) - Esophagus, bx lower esophagus for ulcers                                              Final Diagnosis 02/22/2021                      Value: This result contains rich text formatting which cannot be displayed here   Note 02/22/2021                      Value: This result contains rich text formatting which cannot be displayed here   Additional Information 02/22/2021                      Value: This result contains rich text formatting which cannot be displayed here  Omelia Howes Description 02/22/2021                      Value: This result contains rich text formatting which cannot be displayed here  Radiology Results:   No results found

## 2021-03-20 ENCOUNTER — IMMUNIZATIONS (OUTPATIENT)
Dept: FAMILY MEDICINE CLINIC | Facility: HOSPITAL | Age: 61
End: 2021-03-20

## 2021-03-20 DIAGNOSIS — Z23 ENCOUNTER FOR IMMUNIZATION: Primary | ICD-10-CM

## 2021-03-20 PROCEDURE — 91300 SARS-COV-2 / COVID-19 MRNA VACCINE (PFIZER-BIONTECH) 30 MCG: CPT

## 2021-03-20 PROCEDURE — 0001A SARS-COV-2 / COVID-19 MRNA VACCINE (PFIZER-BIONTECH) 30 MCG: CPT

## 2021-03-22 ENCOUNTER — TELEPHONE (OUTPATIENT)
Dept: GASTROENTEROLOGY | Facility: CLINIC | Age: 61
End: 2021-03-22

## 2021-03-22 NOTE — TELEPHONE ENCOUNTER
----- Message from Sarika Flores sent at 3/19/2021  4:44 PM EDT -----  Regarding: FW: Schedule TIF with laproscopic HH repair  Rhonda, please see below  Thank you so much! !  ----- Message -----  From: ABENA Pickard  Sent: 3/19/2021   4:36 PM EDT  To: Sarika Flores  Subject: Schedule TIF with laproscopic HH repair          Hi Amilcar Desai,     This patient needs a laparoscopic hiatal hernia repair and a TIF procedure  I placed an order for TIF, and Dr Kota Lopez would like to do it at the same time as Dr Chaparro Avalos laparoscopic hiatal hernia repair if we can coordinate that  I gave the patient a referral to see Dr Aly Puentes the surgeon at today's office visit, so she hasn't seen him yet, and we did not schedule the TIF

## 2021-03-22 NOTE — TELEPHONE ENCOUNTER
Spoke with Brenona Delgado from Dr Carlita Humphreys office  Called pt to give her Kims number  Pt will call  I will wait to hear back after pt sees Dr Saida Ching to update this message

## 2021-03-23 NOTE — TELEPHONE ENCOUNTER
Spoke with Pt  She is scheduled to see Dr Saida Ching  She does not want to see him as he has traveled out of the country  She would like to know if you could recommend a different surgeon  Please advise what you would like to do

## 2021-03-23 NOTE — TELEPHONE ENCOUNTER
Pt wants to be scheduled with a different Dr than Dr Ari Hill  I called her to give her Dr Ervin Lara phone number  The mailbox was full  I was unable to leave a message  Dr Lamont Arshad phone number is [phone]  If pt calls back please give her this number

## 2021-03-30 ENCOUNTER — TELEPHONE (OUTPATIENT)
Dept: SURGERY | Facility: CLINIC | Age: 61
End: 2021-03-30

## 2021-03-30 NOTE — TELEPHONE ENCOUNTER
Received voicemail from patient about rescheduling upcoming appt with Dr Saida Ching  I called the patient and left a voicemail, requesting she call the office  When she calls back, please assist in rescheduling her appointment  Her appointment is still in the original time slot

## 2021-04-09 ENCOUNTER — CONSULT (OUTPATIENT)
Dept: SURGERY | Facility: CLINIC | Age: 61
End: 2021-04-09
Payer: COMMERCIAL

## 2021-04-09 VITALS
BODY MASS INDEX: 28.69 KG/M2 | DIASTOLIC BLOOD PRESSURE: 66 MMHG | TEMPERATURE: 97.3 F | HEIGHT: 63 IN | SYSTOLIC BLOOD PRESSURE: 134 MMHG | HEART RATE: 98 BPM | WEIGHT: 161.9 LBS

## 2021-04-09 DIAGNOSIS — K21.00 HIATAL HERNIA WITH GASTROESOPHAGEAL REFLUX DISEASE AND ESOPHAGITIS: Primary | ICD-10-CM

## 2021-04-09 DIAGNOSIS — K44.9 HIATAL HERNIA WITH GASTROESOPHAGEAL REFLUX DISEASE AND ESOPHAGITIS: Primary | ICD-10-CM

## 2021-04-09 PROCEDURE — 99204 OFFICE O/P NEW MOD 45 MIN: CPT | Performed by: SURGERY

## 2021-04-09 NOTE — ASSESSMENT & PLAN NOTE
61-year-old female with a 10 year history of significant gastroesophageal reflux disease, as well as hiatal hernia  Recent endoscopy revealed LA class D esophagitis  Plan:  I had a long conversation with Ms  Celeste Austin about the pathophysiology of gastroesophageal reflux disease, and hiatal hernias  Given her significant reflux disease, as well as some moderate dysphagia to solids we talked about treatment options that would not present issues with dysphagia in the postoperative  I have discussed with Dr Susanna Burns about the possibility of performing a laparoscopic hiatal hernia repair with intraoperative transoral incisionless fundoplication  This would ideally correct her reflux disease with long-term treatment for her esophagitis, while avoiding gas bloat and postoperative is dysphagia  I do want to obtain a barium swallow to evaluate the size of her hernia, as she has not had imaging of this sort in the past   She will return to clinic in 1 week to discuss her imaging, answer any questions, and get her scheduled for this combined procedure

## 2021-04-09 NOTE — PROGRESS NOTES
Assessment/Plan:    Hiatal hernia with gastroesophageal reflux disease and esophagitis  27-year-old female with a 10 year history of significant gastroesophageal reflux disease, as well as hiatal hernia  Recent endoscopy revealed LA class D esophagitis  Plan:  I had a long conversation with Ms Lambert Garnica about the pathophysiology of gastroesophageal reflux disease, and hiatal hernias  Given her significant reflux disease, as well as some moderate dysphagia to solids we talked about treatment options that would not present issues with dysphagia in the postoperative  I have discussed with Dr Chiquis Ramos about the possibility of performing a laparoscopic hiatal hernia repair with intraoperative transoral incisionless fundoplication  This would ideally correct her reflux disease with long-term treatment for her esophagitis, while avoiding gas bloat and postoperative is dysphagia  I do want to obtain a barium swallow to evaluate the size of her hernia, as she has not had imaging of this sort in the past   She will return to clinic in 1 week to discuss her imaging, answer any questions, and get her scheduled for this combined procedure  Diagnoses and all orders for this visit:    Hiatal hernia with gastroesophageal reflux disease and esophagitis  -     FL barium swallow; Future          Subjective:      Patient ID: Siri Walters is a 61 y o  female  27-year-old female with a 10 year history of gastroesophageal reflux disease presents to clinic today to discuss her recent endoscopic results  Since October of 2020 she has had some worsening dysphagia to solids  This is not all the time but has become frequent and persistent enough that it warranted endoscopic evaluation  She recently underwent EGD with Dr Chiquis Ramos which revealed LA class D esophagitis with numerous esophageal ulcers, as well as a moderate size hiatal hernia  She has been on a PPI and Carafate since that time    She does complain of some mild intermittent epigastric pain to go along with persistent heartburn and occasional regurgitation worse when bending over  She does state that occasionally she will vomit a small amount of a digested food  She has no trouble with liquids and those were able to go down nicely  The following portions of the patient's history were reviewed and updated as appropriate: allergies, current medications, past family history, past medical history, past social history, past surgical history and problem list     Review of Systems   Constitutional: Negative for chills, fatigue and fever  HENT: Negative for ear pain, facial swelling, sinus pressure and sinus pain  Eyes: Negative for pain  Respiratory: Negative for cough, shortness of breath and wheezing  Cardiovascular: Negative for chest pain  Gastrointestinal: Positive for abdominal pain and vomiting  Negative for constipation, diarrhea and nausea  Dysphagia  Endocrine: Negative for cold intolerance and heat intolerance  Genitourinary: Negative for dysuria and flank pain  Musculoskeletal: Negative for back pain and neck pain  Skin: Negative for wound  Neurological: Negative for syncope, facial asymmetry, light-headedness and numbness  Psychiatric/Behavioral: Negative for behavioral problems, confusion and suicidal ideas  Objective:      /66   Pulse 98   Temp (!) 97 3 °F (36 3 °C)   Ht 5' 3" (1 6 m)   Wt 73 4 kg (161 lb 14 4 oz)   BMI 28 68 kg/m²          Physical Exam  Vitals signs and nursing note reviewed  Constitutional:       General: She is not in acute distress  Appearance: Normal appearance  She is not toxic-appearing  HENT:      Head: Normocephalic and atraumatic  Mouth/Throat:      Mouth: Mucous membranes are moist    Eyes:      Extraocular Movements: Extraocular movements intact  Pupils: Pupils are equal, round, and reactive to light     Neck:      Musculoskeletal: Normal range of motion and neck supple  Cardiovascular:      Rate and Rhythm: Normal rate and regular rhythm  Pulses: Normal pulses  Pulmonary:      Effort: Pulmonary effort is normal  No respiratory distress  Breath sounds: Normal breath sounds  No wheezing  Abdominal:      General: There is no distension  Palpations: Abdomen is soft  There is no mass  Tenderness: There is abdominal tenderness  There is no guarding or rebound  Hernia: No hernia is present  Comments: Mild tenderness in the epigastrium to deep palpation  Musculoskeletal: Normal range of motion  General: No swelling or deformity  Right lower leg: No edema  Left lower leg: No edema  Skin:     General: Skin is warm and dry  Coloration: Skin is not jaundiced  Neurological:      General: No focal deficit present  Mental Status: She is alert and oriented to person, place, and time     Psychiatric:         Mood and Affect: Mood normal          Behavior: Behavior normal

## 2021-04-10 ENCOUNTER — IMMUNIZATIONS (OUTPATIENT)
Dept: FAMILY MEDICINE CLINIC | Facility: HOSPITAL | Age: 61
End: 2021-04-10

## 2021-04-10 DIAGNOSIS — Z23 ENCOUNTER FOR IMMUNIZATION: Primary | ICD-10-CM

## 2021-04-10 PROCEDURE — 0002A SARS-COV-2 / COVID-19 MRNA VACCINE (PFIZER-BIONTECH) 30 MCG: CPT

## 2021-04-10 PROCEDURE — 91300 SARS-COV-2 / COVID-19 MRNA VACCINE (PFIZER-BIONTECH) 30 MCG: CPT

## 2021-04-12 DIAGNOSIS — L23.9 DERMAL HYPERSENSITIVITY REACTION: ICD-10-CM

## 2021-04-12 RX ORDER — TRIAMCINOLONE ACETONIDE 1 MG/G
1 CREAM TOPICAL 2 TIMES DAILY
Qty: 80 G | Refills: 5 | Status: SHIPPED | OUTPATIENT
Start: 2021-04-12 | End: 2021-08-24 | Stop reason: SDUPTHER

## 2021-04-13 ENCOUNTER — HOSPITAL ENCOUNTER (OUTPATIENT)
Dept: RADIOLOGY | Facility: HOSPITAL | Age: 61
Discharge: HOME | End: 2021-04-13
Attending: SURGERY
Payer: COMMERCIAL

## 2021-04-13 DIAGNOSIS — K21.00 HIATAL HERNIA WITH GASTROESOPHAGEAL REFLUX DISEASE AND ESOPHAGITIS: ICD-10-CM

## 2021-04-13 DIAGNOSIS — K44.9 HIATAL HERNIA WITH GASTROESOPHAGEAL REFLUX DISEASE AND ESOPHAGITIS: ICD-10-CM

## 2021-04-13 PROCEDURE — 74220 X-RAY XM ESOPHAGUS 1CNTRST: CPT

## 2021-04-16 ENCOUNTER — OFFICE VISIT (OUTPATIENT)
Dept: SURGERY | Facility: CLINIC | Age: 61
End: 2021-04-16
Payer: COMMERCIAL

## 2021-04-16 ENCOUNTER — TELEPHONE (OUTPATIENT)
Dept: GASTROENTEROLOGY | Facility: CLINIC | Age: 61
End: 2021-04-16

## 2021-04-16 VITALS
HEART RATE: 82 BPM | WEIGHT: 166 LBS | DIASTOLIC BLOOD PRESSURE: 88 MMHG | BODY MASS INDEX: 29.41 KG/M2 | TEMPERATURE: 97.5 F | SYSTOLIC BLOOD PRESSURE: 140 MMHG | HEIGHT: 63 IN

## 2021-04-16 DIAGNOSIS — F41.9 ANXIETY: ICD-10-CM

## 2021-04-16 DIAGNOSIS — K21.00 GASTROESOPHAGEAL REFLUX DISEASE WITH ESOPHAGITIS WITHOUT HEMORRHAGE: Primary | Chronic | ICD-10-CM

## 2021-04-16 PROCEDURE — 99213 OFFICE O/P EST LOW 20 MIN: CPT | Performed by: SURGERY

## 2021-04-16 PROCEDURE — 3008F BODY MASS INDEX DOCD: CPT | Performed by: INTERNAL MEDICINE

## 2021-04-16 RX ORDER — LORAZEPAM 2 MG/1
2 TABLET ORAL EVERY 6 HOURS PRN
Qty: 120 TABLET | Refills: 1 | Status: SHIPPED | OUTPATIENT
Start: 2021-04-16 | End: 2021-06-29 | Stop reason: SDUPTHER

## 2021-04-16 NOTE — TELEPHONE ENCOUNTER
Spoke with pt  Told her we are working with Dr Mayda Hawkins office  We will call her back when the schedules are coordinated and we know when she will be scheduled

## 2021-04-16 NOTE — PROGRESS NOTES
Assessment/Plan:    Gastroesophageal reflux disease with esophagitis without hemorrhage  61-year-old female with significant GERD with esophagitis, and a small hiatal hernia on recent upper GI, doing well  Plan:  Plan for laparoscopic paraesophageal hernia repair with intra operative transoral incision was fundoplication, by Devang Mayers of Gastroenterology, and EGD  I discussed with the patient the possibility of open repair however unlikely, as well as the use of mesh to reinforce the hiatus  We will plan to do this in the near future  The risks and benefits of surgery were discussed and the patient was amenable to proceed, and consent was signed  I discussed specific risks with her including the high risk of hernia recurrence as elucidated in the literature, as well as some initial dysphagia and trouble swallowing in the immediate postoperative  The patient was amenable to this and acknowledged understanding of requiring a postoperative modified diet  We will proceed with concurrent scheduling with Dr Mary Kay Ceja in the near future for paraesophageal hernia repair with concurrent TIF  Diagnoses and all orders for this visit:    Gastroesophageal reflux disease with esophagitis without hemorrhage          Subjective:      Patient ID: Ethel Lucero is a 61 y o  female  61-year-old female presents back to clinic today to discuss her findings on upper GI  She otherwise feels well and has had no change in her symptomatology since our last visit  Her upper GI revealed a small hiatal hernia and evidence of reflux  Her symptoms continue to involve some persistent heartburn, as well as epigastric abdominal pain and occasional dysphagia to solids  She is tolerating a regular diet for the most part and is maintained on a PPI and Carafate  She underwent testing with the GERD HRQL questionnaire today and scored 42, and is overall dissatisfied with her current condition        The following portions of the patient's history were reviewed and updated as appropriate:   She  has a past medical history of Anxiety, Back pain at L4-L5 level, Schreiber esophagus, Bulimia, Colon polyp, Disease of thyroid gland, Ear problems, GERD (gastroesophageal reflux disease), History of transfusion, Hyperlipidemia, Hypothyroidism, Leukopenia, Nasal congestion, NMS (neuroleptic malignant syndrome) (01/03/2020), Rheumatoid arthritis involving right hip ([address] ), Sciatica, Seizure ([address] ), Synovial cyst of lumbar spine, Vertigo, and Weight gain    She   Patient Active Problem List    Diagnosis Date Noted    Hiatal hernia with gastroesophageal reflux disease and esophagitis 01/08/2021    Polyp of colon 01/08/2021    Melena 11/22/2020    Cellulitis of left upper extremity 09/25/2020    Erosive esophagitis 09/25/2020    Symptomatic anemia 09/21/2020    Multiple excoriations 09/21/2020    Acute non-recurrent maxillary sinusitis 06/01/2020    Fever 05/19/2020    Medicare annual wellness visit, initial 04/29/2020    Dizziness 03/30/2020    Fall at home 01/27/2020    Gastroesophageal reflux disease with esophagitis without hemorrhage 01/27/2020    Hyponatremia 01/27/2020    Problem related to living arrangement 01/27/2020    NMS (neuroleptic malignant syndrome) 01/03/2020    Iron deficiency anemia due to chronic blood loss 11/14/2019    Chronic bilateral low back pain without sciatica 11/01/2019    Right hip pain 07/15/2019    Essential hypertension 06/12/2019    Elevated BP without diagnosis of hypertension 05/07/2019    Dermal hypersensitivity reaction 11/08/2018    ABIMAEL (generalized anxiety disorder) 08/21/2018    Schizoaffective disorder, depressive type ([address] ) 08/16/2018    Serotonin syndrome 08/13/2018    Other fatigue 06/19/2018    Spinal stenosis of lumbar region with neurogenic claudication 06/15/2018    Lumbar spondylosis 06/15/2018    T12 compression fracture ([address] ) 06/15/2018    Synovial cyst of lumbar facet joint 06/15/2018    Localized osteoporosis with current pathological fracture with routine healing 05/25/2018    Lumbar radiculopathy 03/19/2018    DDD (degenerative disc disease), lumbar 03/19/2018    Spondylolisthesis at L4-L5 level 03/19/2018    Anxiety 10/31/2016    Acquired hypothyroidism 10/31/2016    Constipation 04/10/2014    Bulimia nervosa in remission 03/19/2014     She  has a past surgical history that includes Rhinoplasty; Bone marrow biopsy; Breast surgery; Colonoscopy; pr total hip arthroplasty (Right, 12/11/2019); Hip Arthroplasty (Right, 1/2/2020); Sinus surgery; Cosmetic surgery; and Closed reduction distal femur fracture  Her family history includes Colon cancer in her maternal grandmother; Esophageal cancer in her father; Uterine cancer in her mother  She  reports that she has never smoked  She has never used smokeless tobacco  She reports that she does not drink alcohol or use drugs    Current Outpatient Medications   Medication Sig Dispense Refill    famotidine (PEPCID) 40 MG tablet Take 1 tablet (40 mg total) by mouth daily at bedtime 90 tablet 1    ferrous sulfate 325 (65 Fe) mg tablet Take 1 tablet (325 mg total) by mouth 2 (two) times a day with meals 60 tablet 1    levothyroxine 25 mcg tablet Take 1 tablet (25 mcg total) by mouth daily 90 tablet 3    LORazepam (ATIVAN) 2 mg tablet Take 1 tablet (2 mg total) by mouth every 6 (six) hours as needed for anxiety 120 tablet 0    meclizine (ANTIVERT) 25 mg tablet Take 1 tablet (25 mg total) by mouth 3 (three) times a day as needed for dizziness 30 tablet 0    Multiple Vitamin (MULTIVITAMIN) capsule Take 1 capsule by mouth daily 30 capsule 0    ondansetron (ZOFRAN-ODT) 4 mg disintegrating tablet Take 1 tablet (4 mg total) by mouth every 6 (six) hours as needed for nausea or vomiting 20 tablet 3    PARoxetine (PAXIL) 30 mg tablet Take 2 tablets (60 mg total) by mouth daily 180 tablet 3    QUEtiapine (SEROquel) 400 MG tablet TAKE 1 TABLET (400 MG TOTAL) BY MOUTH DAILY AT BEDTIME 90 tablet 3    triamcinolone (KENALOG) 0 1 % cream Apply 1 application topically 2 (two) times a day To affected area 80 g 5    ziprasidone (GEODON) 80 mg capsule TAKE 1 CAPSULE BY MOUTH EVERY DAY 90 capsule 1    omeprazole (PriLOSEC) 40 MG capsule Take 1 capsule (40 mg total) by mouth 2 (two) times a day 180 capsule 3    sucralfate (CARAFATE) 1 g/10 mL suspension Take 10 mL (1 g total) by mouth 4 (four) times a day 420 mL 3     No current facility-administered medications for this visit  She is allergic to risperdal [risperidone]; zyprexa [olanzapine]; and latex       Review of Systems   Constitutional: Negative for chills, fatigue and fever  HENT: Negative for ear pain, facial swelling, sinus pressure and sinus pain  Eyes: Negative for pain  Respiratory: Negative for cough, shortness of breath and wheezing  Cardiovascular: Negative for chest pain  Gastrointestinal: Positive for abdominal pain  Negative for constipation, diarrhea, nausea and vomiting  Endocrine: Negative for cold intolerance and heat intolerance  Genitourinary: Negative for dysuria and flank pain  Musculoskeletal: Negative for back pain and neck pain  Skin: Negative for wound  Neurological: Negative for syncope, facial asymmetry, light-headedness and numbness  Psychiatric/Behavioral: Negative for behavioral problems, confusion and suicidal ideas  Objective:      /88   Pulse 82   Temp 97 5 °F (36 4 °C)   Ht 5' 3" (1 6 m)   Wt 75 3 kg (166 lb)   BMI 29 41 kg/m²          Physical Exam  Vitals signs and nursing note reviewed  Constitutional:       General: She is not in acute distress  Appearance: Normal appearance  She is not toxic-appearing  HENT:      Head: Normocephalic and atraumatic  Mouth/Throat:      Mouth: Mucous membranes are moist    Eyes:      Extraocular Movements: Extraocular movements intact        Pupils: Pupils are equal, round, and reactive to light  Neck:      Musculoskeletal: Normal range of motion and neck supple  Cardiovascular:      Rate and Rhythm: Normal rate and regular rhythm  Pulses: Normal pulses  Pulmonary:      Effort: Pulmonary effort is normal  No respiratory distress  Breath sounds: Normal breath sounds  No wheezing  Abdominal:      General: There is no distension  Palpations: Abdomen is soft  There is no mass  Tenderness: There is no abdominal tenderness  There is no guarding or rebound  Hernia: No hernia is present  Musculoskeletal: Normal range of motion  General: No swelling or deformity  Right lower leg: No edema  Left lower leg: No edema  Skin:     General: Skin is warm and dry  Coloration: Skin is not jaundiced  Neurological:      General: No focal deficit present  Mental Status: She is alert and oriented to person, place, and time     Psychiatric:         Mood and Affect: Mood normal          Behavior: Behavior normal

## 2021-04-16 NOTE — TELEPHONE ENCOUNTER
Patient left message regarding the tif procedure and getting scheduled with Dr Marcos Charles and Dr Susan Valadez  Please return her call   Thank you

## 2021-04-16 NOTE — H&P (VIEW-ONLY)
Assessment/Plan:    Gastroesophageal reflux disease with esophagitis without hemorrhage  28-year-old female with significant GERD with esophagitis, and a small hiatal hernia on recent upper GI, doing well  Plan:  Plan for laparoscopic paraesophageal hernia repair with intra operative transoral incision was fundoplication, by Maximilian Mason of Gastroenterology, and EGD  I discussed with the patient the possibility of open repair however unlikely, as well as the use of mesh to reinforce the hiatus  We will plan to do this in the near future  The risks and benefits of surgery were discussed and the patient was amenable to proceed, and consent was signed  I discussed specific risks with her including the high risk of hernia recurrence as elucidated in the literature, as well as some initial dysphagia and trouble swallowing in the immediate postoperative  The patient was amenable to this and acknowledged understanding of requiring a postoperative modified diet  We will proceed with concurrent scheduling with Dr Kendra Benson in the near future for paraesophageal hernia repair with concurrent TIF  Diagnoses and all orders for this visit:    Gastroesophageal reflux disease with esophagitis without hemorrhage          Subjective:      Patient ID: Omar Rodriguez is a 61 y o  female  28-year-old female presents back to clinic today to discuss her findings on upper GI  She otherwise feels well and has had no change in her symptomatology since our last visit  Her upper GI revealed a small hiatal hernia and evidence of reflux  Her symptoms continue to involve some persistent heartburn, as well as epigastric abdominal pain and occasional dysphagia to solids  She is tolerating a regular diet for the most part and is maintained on a PPI and Carafate  She underwent testing with the GERD HRQL questionnaire today and scored 42, and is overall dissatisfied with her current condition        The following portions of the patient's history were reviewed and updated as appropriate:   She  has a past medical history of Anxiety, Back pain at L4-L5 level, Schreiber esophagus, Bulimia, Colon polyp, Disease of thyroid gland, Ear problems, GERD (gastroesophageal reflux disease), History of transfusion, Hyperlipidemia, Hypothyroidism, Leukopenia, Nasal congestion, NMS (neuroleptic malignant syndrome) (01/03/2020), Rheumatoid arthritis involving right hip ([address] ), Sciatica, Seizure ([address] ), Synovial cyst of lumbar spine, Vertigo, and Weight gain    She   Patient Active Problem List    Diagnosis Date Noted    Hiatal hernia with gastroesophageal reflux disease and esophagitis 01/08/2021    Polyp of colon 01/08/2021    Melena 11/22/2020    Cellulitis of left upper extremity 09/25/2020    Erosive esophagitis 09/25/2020    Symptomatic anemia 09/21/2020    Multiple excoriations 09/21/2020    Acute non-recurrent maxillary sinusitis 06/01/2020    Fever 05/19/2020    Medicare annual wellness visit, initial 04/29/2020    Dizziness 03/30/2020    Fall at home 01/27/2020    Gastroesophageal reflux disease with esophagitis without hemorrhage 01/27/2020    Hyponatremia 01/27/2020    Problem related to living arrangement 01/27/2020    NMS (neuroleptic malignant syndrome) 01/03/2020    Iron deficiency anemia due to chronic blood loss 11/14/2019    Chronic bilateral low back pain without sciatica 11/01/2019    Right hip pain 07/15/2019    Essential hypertension 06/12/2019    Elevated BP without diagnosis of hypertension 05/07/2019    Dermal hypersensitivity reaction 11/08/2018    ABIMAEL (generalized anxiety disorder) 08/21/2018    Schizoaffective disorder, depressive type ([address] ) 08/16/2018    Serotonin syndrome 08/13/2018    Other fatigue 06/19/2018    Spinal stenosis of lumbar region with neurogenic claudication 06/15/2018    Lumbar spondylosis 06/15/2018    T12 compression fracture ([address] ) 06/15/2018    Synovial cyst of lumbar facet joint 06/15/2018    Localized osteoporosis with current pathological fracture with routine healing 05/25/2018    Lumbar radiculopathy 03/19/2018    DDD (degenerative disc disease), lumbar 03/19/2018    Spondylolisthesis at L4-L5 level 03/19/2018    Anxiety 10/31/2016    Acquired hypothyroidism 10/31/2016    Constipation 04/10/2014    Bulimia nervosa in remission 03/19/2014     She  has a past surgical history that includes Rhinoplasty; Bone marrow biopsy; Breast surgery; Colonoscopy; pr total hip arthroplasty (Right, 12/11/2019); Hip Arthroplasty (Right, 1/2/2020); Sinus surgery; Cosmetic surgery; and Closed reduction distal femur fracture  Her family history includes Colon cancer in her maternal grandmother; Esophageal cancer in her father; Uterine cancer in her mother  She  reports that she has never smoked  She has never used smokeless tobacco  She reports that she does not drink alcohol or use drugs    Current Outpatient Medications   Medication Sig Dispense Refill    famotidine (PEPCID) 40 MG tablet Take 1 tablet (40 mg total) by mouth daily at bedtime 90 tablet 1    ferrous sulfate 325 (65 Fe) mg tablet Take 1 tablet (325 mg total) by mouth 2 (two) times a day with meals 60 tablet 1    levothyroxine 25 mcg tablet Take 1 tablet (25 mcg total) by mouth daily 90 tablet 3    LORazepam (ATIVAN) 2 mg tablet Take 1 tablet (2 mg total) by mouth every 6 (six) hours as needed for anxiety 120 tablet 0    meclizine (ANTIVERT) 25 mg tablet Take 1 tablet (25 mg total) by mouth 3 (three) times a day as needed for dizziness 30 tablet 0    Multiple Vitamin (MULTIVITAMIN) capsule Take 1 capsule by mouth daily 30 capsule 0    ondansetron (ZOFRAN-ODT) 4 mg disintegrating tablet Take 1 tablet (4 mg total) by mouth every 6 (six) hours as needed for nausea or vomiting 20 tablet 3    PARoxetine (PAXIL) 30 mg tablet Take 2 tablets (60 mg total) by mouth daily 180 tablet 3    QUEtiapine (SEROquel) 400 MG tablet TAKE 1 TABLET (400 MG TOTAL) BY MOUTH DAILY AT BEDTIME 90 tablet 3    triamcinolone (KENALOG) 0 1 % cream Apply 1 application topically 2 (two) times a day To affected area 80 g 5    ziprasidone (GEODON) 80 mg capsule TAKE 1 CAPSULE BY MOUTH EVERY DAY 90 capsule 1    omeprazole (PriLOSEC) 40 MG capsule Take 1 capsule (40 mg total) by mouth 2 (two) times a day 180 capsule 3    sucralfate (CARAFATE) 1 g/10 mL suspension Take 10 mL (1 g total) by mouth 4 (four) times a day 420 mL 3     No current facility-administered medications for this visit  She is allergic to risperdal [risperidone]; zyprexa [olanzapine]; and latex       Review of Systems   Constitutional: Negative for chills, fatigue and fever  HENT: Negative for ear pain, facial swelling, sinus pressure and sinus pain  Eyes: Negative for pain  Respiratory: Negative for cough, shortness of breath and wheezing  Cardiovascular: Negative for chest pain  Gastrointestinal: Positive for abdominal pain  Negative for constipation, diarrhea, nausea and vomiting  Endocrine: Negative for cold intolerance and heat intolerance  Genitourinary: Negative for dysuria and flank pain  Musculoskeletal: Negative for back pain and neck pain  Skin: Negative for wound  Neurological: Negative for syncope, facial asymmetry, light-headedness and numbness  Psychiatric/Behavioral: Negative for behavioral problems, confusion and suicidal ideas  Objective:      /88   Pulse 82   Temp 97 5 °F (36 4 °C)   Ht 5' 3" (1 6 m)   Wt 75 3 kg (166 lb)   BMI 29 41 kg/m²          Physical Exam  Vitals signs and nursing note reviewed  Constitutional:       General: She is not in acute distress  Appearance: Normal appearance  She is not toxic-appearing  HENT:      Head: Normocephalic and atraumatic  Mouth/Throat:      Mouth: Mucous membranes are moist    Eyes:      Extraocular Movements: Extraocular movements intact        Pupils: Pupils are equal, round, and reactive to light  Neck:      Musculoskeletal: Normal range of motion and neck supple  Cardiovascular:      Rate and Rhythm: Normal rate and regular rhythm  Pulses: Normal pulses  Pulmonary:      Effort: Pulmonary effort is normal  No respiratory distress  Breath sounds: Normal breath sounds  No wheezing  Abdominal:      General: There is no distension  Palpations: Abdomen is soft  There is no mass  Tenderness: There is no abdominal tenderness  There is no guarding or rebound  Hernia: No hernia is present  Musculoskeletal: Normal range of motion  General: No swelling or deformity  Right lower leg: No edema  Left lower leg: No edema  Skin:     General: Skin is warm and dry  Coloration: Skin is not jaundiced  Neurological:      General: No focal deficit present  Mental Status: She is alert and oriented to person, place, and time     Psychiatric:         Mood and Affect: Mood normal          Behavior: Behavior normal

## 2021-04-16 NOTE — ASSESSMENT & PLAN NOTE
54-year-old female with significant GERD with esophagitis, and a small hiatal hernia on recent upper GI, doing well  Plan:  Plan for laparoscopic paraesophageal hernia repair with intra operative transoral incision was fundoplication, by Thierno Gallagher of Gastroenterology, and EGD  I discussed with the patient the possibility of open repair however unlikely, as well as the use of mesh to reinforce the hiatus  We will plan to do this in the near future  The risks and benefits of surgery were discussed and the patient was amenable to proceed, and consent was signed  I discussed specific risks with her including the high risk of hernia recurrence as elucidated in the literature, as well as some initial dysphagia and trouble swallowing in the immediate postoperative  The patient was amenable to this and acknowledged understanding of requiring a postoperative modified diet  We will proceed with concurrent scheduling with Dr Rowdy Jeter in the near future for paraesophageal hernia repair with concurrent TIF

## 2021-04-19 ENCOUNTER — TELEPHONE (OUTPATIENT)
Dept: SURGERY | Facility: CLINIC | Age: 61
End: 2021-04-19

## 2021-04-19 NOTE — TELEPHONE ENCOUNTER
Pt called and is asking to speak to Dr Renu Restrepo, directly  She wants to explain to him about an anesthesia complication she had from surgery last January  Sending this msg  To Dr Renu Restrepo for info and advisement      Pt can be reached at [phone] mc

## 2021-04-22 ENCOUNTER — TELEPHONE (OUTPATIENT)
Dept: INTERNAL MEDICINE CLINIC | Facility: CLINIC | Age: 61
End: 2021-04-22

## 2021-04-22 ENCOUNTER — HOSPITAL ENCOUNTER (EMERGENCY)
Facility: HOSPITAL | Age: 61
Discharge: HOME | End: 2021-04-22
Attending: EMERGENCY MEDICINE
Payer: COMMERCIAL

## 2021-04-22 ENCOUNTER — APPOINTMENT (EMERGENCY)
Dept: RADIOLOGY | Facility: HOSPITAL | Age: 61
End: 2021-04-22
Payer: COMMERCIAL

## 2021-04-22 VITALS
BODY MASS INDEX: 28.34 KG/M2 | RESPIRATION RATE: 14 BRPM | OXYGEN SATURATION: 97 % | WEIGHT: 160 LBS | SYSTOLIC BLOOD PRESSURE: 135 MMHG | HEART RATE: 88 BPM | DIASTOLIC BLOOD PRESSURE: 82 MMHG | TEMPERATURE: 97.5 F

## 2021-04-22 DIAGNOSIS — R06.00 DYSPNEA: ICD-10-CM

## 2021-04-22 DIAGNOSIS — T78.40XA ALLERGIC REACTION: ICD-10-CM

## 2021-04-22 DIAGNOSIS — R21 RASH: ICD-10-CM

## 2021-04-22 DIAGNOSIS — F41.9 ANXIETY: Primary | ICD-10-CM

## 2021-04-22 LAB
ALBUMIN SERPL BCP-MCNC: 3.4 G/DL (ref 3.5–5)
ALP SERPL-CCNC: 82 U/L (ref 46–116)
ALT SERPL W P-5'-P-CCNC: 31 U/L (ref 12–78)
ANION GAP SERPL CALCULATED.3IONS-SCNC: 11 MMOL/L (ref 4–13)
AST SERPL W P-5'-P-CCNC: 25 U/L (ref 5–45)
ATRIAL RATE: 80 BPM
BASOPHILS # BLD AUTO: 0.04 THOUSANDS/ΜL (ref 0–0.1)
BASOPHILS NFR BLD AUTO: 1 % (ref 0–1)
BILIRUB SERPL-MCNC: 0.31 MG/DL (ref 0.2–1)
BUN SERPL-MCNC: 4 MG/DL (ref 5–25)
CALCIUM ALBUM COR SERPL-MCNC: 9.5 MG/DL (ref 8.3–10.1)
CALCIUM SERPL-MCNC: 9 MG/DL (ref 8.3–10.1)
CHLORIDE SERPL-SCNC: 97 MMOL/L (ref 100–108)
CO2 SERPL-SCNC: 22 MMOL/L (ref 21–32)
CREAT SERPL-MCNC: 0.82 MG/DL (ref 0.6–1.3)
EOSINOPHIL # BLD AUTO: 0.05 THOUSAND/ΜL (ref 0–0.61)
EOSINOPHIL NFR BLD AUTO: 1 % (ref 0–6)
ERYTHROCYTE [DISTWIDTH] IN BLOOD BY AUTOMATED COUNT: 13.4 % (ref 11.6–15.1)
GFR SERPL CREATININE-BSD FRML MDRD: 78 ML/MIN/1.73SQ M
GLUCOSE SERPL-MCNC: 123 MG/DL (ref 65–140)
HCT VFR BLD AUTO: 36.5 % (ref 34.8–46.1)
HGB BLD-MCNC: 12.6 G/DL (ref 11.5–15.4)
IMM GRANULOCYTES # BLD AUTO: 0.02 THOUSAND/UL (ref 0–0.2)
IMM GRANULOCYTES NFR BLD AUTO: 0 % (ref 0–2)
LIPASE SERPL-CCNC: 95 U/L (ref 73–393)
LYMPHOCYTES # BLD AUTO: 0.48 THOUSANDS/ΜL (ref 0.6–4.47)
LYMPHOCYTES NFR BLD AUTO: 8 % (ref 14–44)
MCH RBC QN AUTO: 29.6 PG (ref 26.8–34.3)
MCHC RBC AUTO-ENTMCNC: 34.5 G/DL (ref 31.4–37.4)
MCV RBC AUTO: 86 FL (ref 82–98)
MONOCYTES # BLD AUTO: 0.32 THOUSAND/ΜL (ref 0.17–1.22)
MONOCYTES NFR BLD AUTO: 5 % (ref 4–12)
NEUTROPHILS # BLD AUTO: 5.27 THOUSANDS/ΜL (ref 1.85–7.62)
NEUTS SEG NFR BLD AUTO: 85 % (ref 43–75)
NRBC BLD AUTO-RTO: 0 /100 WBCS
P AXIS: 70 DEGREES
PLATELET # BLD AUTO: 276 THOUSANDS/UL (ref 149–390)
PMV BLD AUTO: 8.3 FL (ref 8.9–12.7)
POTASSIUM SERPL-SCNC: 3.8 MMOL/L (ref 3.5–5.3)
PR INTERVAL: 132 MS
PROT SERPL-MCNC: 7 G/DL (ref 6.4–8.2)
QRS AXIS: 52 DEGREES
QRSD INTERVAL: 78 MS
QT INTERVAL: 364 MS
QTC INTERVAL: 419 MS
RBC # BLD AUTO: 4.25 MILLION/UL (ref 3.81–5.12)
SODIUM SERPL-SCNC: 130 MMOL/L (ref 136–145)
T WAVE AXIS: 74 DEGREES
TROPONIN I SERPL-MCNC: <0.02 NG/ML
VENTRICULAR RATE: 80 BPM
WBC # BLD AUTO: 6.18 THOUSAND/UL (ref 4.31–10.16)

## 2021-04-22 PROCEDURE — 84484 ASSAY OF TROPONIN QUANT: CPT

## 2021-04-22 PROCEDURE — 80053 COMPREHEN METABOLIC PANEL: CPT

## 2021-04-22 PROCEDURE — 99285 EMERGENCY DEPT VISIT HI MDM: CPT | Performed by: EMERGENCY MEDICINE

## 2021-04-22 PROCEDURE — 36415 COLL VENOUS BLD VENIPUNCTURE: CPT

## 2021-04-22 PROCEDURE — 83690 ASSAY OF LIPASE: CPT

## 2021-04-22 PROCEDURE — 99285 EMERGENCY DEPT VISIT HI MDM: CPT

## 2021-04-22 PROCEDURE — 96374 THER/PROPH/DIAG INJ IV PUSH: CPT

## 2021-04-22 PROCEDURE — 71045 X-RAY EXAM CHEST 1 VIEW: CPT

## 2021-04-22 PROCEDURE — 85025 COMPLETE CBC W/AUTO DIFF WBC: CPT

## 2021-04-22 PROCEDURE — 93010 ELECTROCARDIOGRAM REPORT: CPT | Performed by: INTERNAL MEDICINE

## 2021-04-22 PROCEDURE — 93005 ELECTROCARDIOGRAM TRACING: CPT

## 2021-04-22 RX ORDER — PREDNISONE 20 MG/1
40 TABLET ORAL DAILY
Qty: 8 TABLET | Refills: 0 | Status: SHIPPED | OUTPATIENT
Start: 2021-04-22 | End: 2021-04-26

## 2021-04-22 RX ORDER — METHYLPREDNISOLONE SODIUM SUCCINATE 125 MG/2ML
60 INJECTION, POWDER, LYOPHILIZED, FOR SOLUTION INTRAMUSCULAR; INTRAVENOUS ONCE
Status: COMPLETED | OUTPATIENT
Start: 2021-04-22 | End: 2021-04-22

## 2021-04-22 RX ORDER — LORAZEPAM 1 MG/1
2 TABLET ORAL ONCE
Status: COMPLETED | OUTPATIENT
Start: 2021-04-22 | End: 2021-04-22

## 2021-04-22 RX ADMIN — METHYLPREDNISOLONE SODIUM SUCCINATE 60 MG: 125 INJECTION, POWDER, FOR SOLUTION INTRAMUSCULAR; INTRAVENOUS at 10:54

## 2021-04-22 RX ADMIN — LORAZEPAM 2 MG: 1 TABLET ORAL at 10:53

## 2021-04-22 NOTE — TELEPHONE ENCOUNTER
Yesenia Jeffries called to tell you what is wrong with irene  Faith He and Yesenia Jeffries had a terrible fight last night  This morning irene went to the emergency room  irene told her mom that she was really sick  She is at [place]  If you can find out any information on Irene please call Yesenia Jeffries

## 2021-04-22 NOTE — ED PROVIDER NOTES
History  Chief Complaint   Patient presents with    Anxiety     pt reports generalized itching/allergies, shortness of breath, and vomiting since last night  pt denies pain    Shortness of Breath       History provided by:  Patient   used: No    Shortness of Breath  Severity:  Mild  Onset quality:  Gradual  Duration:  10 hours  Timing:  Constant  Progression:  Unchanged  Chronicity:  New  Context: animal exposure and known allergens    Context: not activity, not emotional upset, not fumes, not occupational exposure, not pollens, not strong odors, not URI and not weather changes    Relieved by:  Nothing  Worsened by:  Nothing  Ineffective treatments:  None tried  Associated symptoms: cough and vomiting    Associated symptoms: no abdominal pain, no chest pain, no claudication, no diaphoresis, no ear pain, no fever, no headaches, no hemoptysis, no neck pain, no PND, no rash, no sore throat, no sputum production, no syncope, no swollen glands and no wheezing    Risk factors: no recent alcohol use, no family hx of DVT, no hx of cancer, no hx of PE/DVT, no obesity, no oral contraceptive use, no prolonged immobilization, no recent surgery and no tobacco use        Prior to Admission Medications   Prescriptions Last Dose Informant Patient Reported? Taking?    LORazepam (ATIVAN) 2 mg tablet   No No   Sig: Take 1 tablet (2 mg total) by mouth every 6 (six) hours as needed for anxiety   Multiple Vitamin (MULTIVITAMIN) capsule  Self No No   Sig: Take 1 capsule by mouth daily   PARoxetine (PAXIL) 30 mg tablet  Self No No   Sig: Take 2 tablets (60 mg total) by mouth daily   QUEtiapine (SEROquel) 400 MG tablet  Self No No   Sig: TAKE 1 TABLET (400 MG TOTAL) BY MOUTH DAILY AT BEDTIME   famotidine (PEPCID) 40 MG tablet   No No   Sig: Take 1 tablet (40 mg total) by mouth daily at bedtime   ferrous sulfate 325 (65 Fe) mg tablet  Self No No   Sig: Take 1 tablet (325 mg total) by mouth 2 (two) times a day with meals levothyroxine 25 mcg tablet  Self No No   Sig: Take 1 tablet (25 mcg total) by mouth daily   meclizine (ANTIVERT) 25 mg tablet  Self No No   Sig: Take 1 tablet (25 mg total) by mouth 3 (three) times a day as needed for dizziness   omeprazole (PriLOSEC) 40 MG capsule  Self No No   Sig: Take 1 capsule (40 mg total) by mouth 2 (two) times a day   ondansetron (ZOFRAN-ODT) 4 mg disintegrating tablet  Self No No   Sig: Take 1 tablet (4 mg total) by mouth every 6 (six) hours as needed for nausea or vomiting   sucralfate (CARAFATE) 1 g/10 mL suspension  Self No No   Sig: Take 10 mL (1 g total) by mouth 4 (four) times a day   triamcinolone (KENALOG) 0 1 % cream   No No   Sig: Apply 1 application topically 2 (two) times a day To affected area   ziprasidone (GEODON) 80 mg capsule  Self No No   Sig: TAKE 1 CAPSULE BY MOUTH EVERY DAY      Facility-Administered Medications: None       Past Medical History:   Diagnosis Date    Anxiety     Back pain at L4-L5 level     Schreiber esophagus     Bulimia     Colon polyp     Disease of thyroid gland     hypothyroid    Ear problems     GERD (gastroesophageal reflux disease)     History of transfusion     Hyperlipidemia     Hypothyroidism     Leukopenia     Nasal congestion     NMS (neuroleptic malignant syndrome) 01/03/2020    Rheumatoid arthritis involving right hip (HCC)     Sciatica     Seizure (HCC)     due to medication mix up 8/2018 only one historically    Synovial cyst of lumbar spine     Vertigo     Weight gain        Past Surgical History:   Procedure Laterality Date    BONE MARROW BIOPSY      BREAST SURGERY      enlargement     CLOSED REDUCTION DISTAL FEMUR FRACTURE      COLONOSCOPY      COSMETIC SURGERY      HIP ARTHROPLASTY Right 1/2/2020    Procedure: REVISION TOTAL HIP ARTHROPLASTY WITH REPAIR OF PARIPROSTHETIC FRACTURE - POSTERIOR APPROACH;  Surgeon: Tonya Strong MD;  [facility];  Service: Orthopedics    MD TOTAL HIP ARTHROPLASTY Right 12/11/2019    Procedure: ARTHROPLASTY HIP TOTAL ANTERIOR;  Surgeon: Blayne Lay MD;  [facility];  Service: Orthopedics    RHINOPLASTY      SINUS SURGERY         Family History   Problem Relation Age of Onset    Uterine cancer Mother     Esophageal cancer Father     Colon cancer Maternal Grandmother      I have reviewed and agree with the history as documented  E-Cigarette/Vaping    E-Cigarette Use Never User      E-Cigarette/Vaping Substances    Nicotine No     THC No     CBD No     Flavoring No     Other No     Unknown No      Social History     Tobacco Use    Smoking status: Never Smoker    Smokeless tobacco: Never Used   Substance Use Topics    Alcohol use: Never     Frequency: Never     Binge frequency: Never    Drug use: No       Review of Systems   Constitutional: Negative for activity change, chills, diaphoresis, fatigue and fever  HENT: Negative for ear pain, sore throat, trouble swallowing and voice change  Eyes: Negative for pain and visual disturbance  Respiratory: Positive for cough and shortness of breath  Negative for hemoptysis, sputum production, choking and wheezing  Cardiovascular: Negative for chest pain, claudication, leg swelling, syncope and PND  Gastrointestinal: Positive for vomiting  Negative for abdominal distention, abdominal pain and diarrhea  Endocrine: Negative for polydipsia and polyuria  Genitourinary: Negative for difficulty urinating, dysuria and flank pain  Musculoskeletal: Negative for neck pain and neck stiffness  Skin: Negative for color change and rash  Neurological: Negative for dizziness, speech difficulty and headaches  Hematological: Does not bruise/bleed easily  Psychiatric/Behavioral: Negative for agitation, behavioral problems, hallucinations and suicidal ideas  Physical Exam  Physical Exam  HENT:      Head: Normocephalic and atraumatic     Eyes:      Conjunctiva/sclera: Conjunctivae normal  Pupils: Pupils are equal, round, and reactive to light  Neck:      Musculoskeletal: Normal range of motion and neck supple  Cardiovascular:      Rate and Rhythm: Normal rate and regular rhythm  Heart sounds: No murmur  Pulmonary:      Effort: Pulmonary effort is normal  No respiratory distress  Breath sounds: Normal breath sounds  Abdominal:      General: Bowel sounds are normal  There is no distension  Palpations: Abdomen is soft  Tenderness: There is no abdominal tenderness  Comments: No Signs of Peritonitis    Musculoskeletal: Normal range of motion  Skin:     General: Skin is warm and dry  Capillary Refill: Capillary refill takes less than 2 seconds  Coloration: Skin is not pale  Findings: No rash  Comments: Patient has multiple areas of excoriation on limbs and trunk upper and lower extremities and back secondary to pruritus  Neurological:      Mental Status: She is alert and oriented to person, place, and time  GCS: GCS eye subscore is 4  GCS verbal subscore is 5  GCS motor subscore is 6  Comments: Normal speech, Normal gait, No Focal neurologic deficits    Psychiatric:         Mood and Affect: Mood is anxious           Behavior: Behavior normal          Vital Signs  ED Triage Vitals [04/22/21 1003]   Temperature Pulse Respirations Blood Pressure SpO2   97 5 °F (36 4 °C) 83 (!) 24 156/92 98 %      Temp Source Heart Rate Source Patient Position - Orthostatic VS BP Location FiO2 (%)   Oral Monitor -- -- --      Pain Score       --           Vitals:    04/22/21 1003   BP: 156/92   Pulse: 83         Visual Acuity      ED Medications  Medications   methylPREDNISolone sodium succinate (Solu-MEDROL) injection 60 mg (has no administration in time range)   LORazepam (ATIVAN) tablet 2 mg (has no administration in time range)       Diagnostic Studies  Results Reviewed     Procedure Component Value Units Date/Time    CBC and differential [[id number]] (Abnormal) Collected: 04/22/21 1012    Lab Status: Final result Specimen: Blood from Arm, Left Updated: 04/22/21 1022     WBC 6 18 Thousand/uL      RBC 4 25 Million/uL      Hemoglobin 12 6 g/dL      Hematocrit 36 5 %      MCV 86 fL      MCH 29 6 pg      MCHC 34 5 g/dL      RDW 13 4 %      MPV 8 3 fL      Platelets 927 Thousands/uL      nRBC 0 /100 WBCs      Neutrophils Relative 85 %      Immat GRANS % 0 %      Lymphocytes Relative 8 %      Monocytes Relative 5 %      Eosinophils Relative 1 %      Basophils Relative 1 %      Neutrophils Absolute 5 27 Thousands/µL      Immature Grans Absolute 0 02 Thousand/uL      Lymphocytes Absolute 0 48 Thousands/µL      Monocytes Absolute 0 32 Thousand/µL      Eosinophils Absolute 0 05 Thousand/µL      Basophils Absolute 0 04 Thousands/µL     Troponin I [[id number]] Collected: 04/22/21 1012    Lab Status: In process Specimen: Blood from Arm, Left Updated: 04/22/21 1017    Comprehensive metabolic panel [[id number]] Collected: 04/22/21 1012    Lab Status: In process Specimen: Blood from Arm, Left Updated: 04/22/21 1017    Lipase [[id number]] Collected: 04/22/21 1012    Lab Status: In process Specimen: Blood from Arm, Left Updated: 04/22/21 1017                 XR chest 1 view portable   Final Result by Jean Landry MD (04/22 1353)      No acute cardiopulmonary disease                  Workstation performed: [id number]                    Procedures  ECG 12 Lead Documentation Only    Date/Time: 4/22/2021 10:27 AM  Performed by: Adelfo Pierson MD  Authorized by: Adelfo Pierson MD     Indications / Diagnosis:  Sob  ECG reviewed by me, the ED Provider: yes    Patient location:  ED  Previous ECG:     Previous ECG:  Compared to current    Comparison ECG info:  71     Similarity:  No change    Comparison to cardiac monitor: No    Interpretation:     Interpretation: non-specific    Rate:     ECG rate:  80    ECG rate assessment: normal    Rhythm:     Rhythm: sinus rhythm    Ectopy:     Ectopy: none    QRS:     QRS axis:  Normal  Conduction:     Conduction: normal    ST segments:     ST segments:  Normal  T waves:     T waves: normal               ED Course        Patient reassessed status post steroids and Ativan feels better like to go home is requesting discharge  Will send patient home with a short course of steroids to help with symptoms  Strict return precautions given                      SBIRT 22yo+      Most Recent Value   SBIRT (22 yo +)   In order to provide better care to our patients, we are screening all of our patients for alcohol and drug use  Would it be okay to ask you these screening questions? No Filed at: 04/22/2021 1019   Initial Alcohol Screen: US AUDIT-C    1  How often do you have a drink containing alcohol?  0 Filed at: 04/22/2021 1019   3a  Male UNDER 65: How often do you have five or more drinks on one occasion? 0 Filed at: 04/22/2021 1019   3b  FEMALE Any Age, or MALE 65+: How often do you have 4 or more drinks on one occassion? 0 Filed at: 04/22/2021 1019   Audit-C Score  0 Filed at: 04/22/2021 1019   MIA: How many times in the past year have you    Used an illegal drug or used a prescription medication for non-medical reasons? Never Filed at: 04/22/2021 1019                    MDM  Number of Diagnoses or Management Options  Allergic reaction: new and does not require workup  Anxiety: established and worsening  Dyspnea: new and does not require workup  Rash: new and does not require workup  Diagnosis management comments: Patient presents with shortness of breath and pruritus after contact with her pad at home reports no additional precipitants  There is no mucocutaneous involvement to suggest severe anaphylaxis at this time period  Prescribe patient course of steroids for itching and rash for supportive care and treatment of symptoms     Patient also admits to feeling anxious and short of breath today symptoms appear to be consistent with her anxiety will check screening ECG labs  Patient also admits to missing her morning dose of Ativan will give morning dose and reassess  Amount and/or Complexity of Data Reviewed  Clinical lab tests: ordered and reviewed  Tests in the radiology section of CPT®: ordered and reviewed  Tests in the medicine section of CPT®: ordered and reviewed  Independent visualization of images, tracings, or specimens: yes    Risk of Complications, Morbidity, and/or Mortality  Presenting problems: moderate  Diagnostic procedures: moderate  Management options: moderate    Patient Progress  Patient progress: improved      Disposition  Final diagnoses:   Anxiety   Dyspnea   Allergic reaction   Rash     Time reflects when diagnosis was documented in both MDM as applicable and the Disposition within this note     Time User Action Codes Description Comment    4/22/2021 12:13 PM Dewaine Cobble [F41 9] Anxiety     4/22/2021 12:13 PM Teresia Bees Add [R06 00] Dyspnea     4/22/2021 12:13 PM Teresia Bees Add [T78 40XA] Allergic reaction     4/22/2021 12:14 PM Edilberto Steindaniela 47 Rash       ED Disposition     ED Disposition Condition Date/Time Comment    Discharge Stable Thu Apr 22, 2021 12:13 PM Ethel Sink discharge to home/self care  Follow-up Information     Follow up With Specialties Details Why Contact Info Additional Information    Aracelis Alston MD Internal Medicine In 1 week  [address] [place]        [facility] Emergency Department Emergency Medicine  If symptoms worsen [address]  [address] [facility] Emergency Department, [address] [place], [place], [address] [phone]          Patient's Medications   Discharge Prescriptions    No medications on file     No discharge procedures on file      PDMP Review       Value Time User    PDMP Reviewed  Yes 4/16/2021  2:40 PM Aracelis Alston MD          ED Provider  Electronically Signed by           Tiffanie Stapels MD  04/23/21 7220

## 2021-04-22 NOTE — DISCHARGE INSTRUCTIONS
Please keep your open wounds clean and dry on skin  Please take prednisone (prescribed) for next 4 days to help with the mild allergic reaction your experiencing  Contact your primary care provider for re-evaluation or symptoms in the next week  Return immediately to emergency department should your shortness of breath return or worsen  We develop any new or worsening symptoms such as fever abdominal pain recurrence of your vomiting or have any new concerns

## 2021-04-22 NOTE — TELEPHONE ENCOUNTER
She should first contact her daughter for information, and  If her daughter is unable to give her information, then the hospital

## 2021-04-26 ENCOUNTER — TELEMEDICINE (OUTPATIENT)
Dept: INTERNAL MEDICINE CLINIC | Facility: CLINIC | Age: 61
End: 2021-04-26
Payer: COMMERCIAL

## 2021-04-26 DIAGNOSIS — K22.10 EROSIVE ESOPHAGITIS: ICD-10-CM

## 2021-04-26 DIAGNOSIS — K92.0 HEMATEMESIS: ICD-10-CM

## 2021-04-26 DIAGNOSIS — R21 RASH: Primary | ICD-10-CM

## 2021-04-26 DIAGNOSIS — R10.13 EPIGASTRIC PAIN: ICD-10-CM

## 2021-04-26 PROCEDURE — 99213 OFFICE O/P EST LOW 20 MIN: CPT | Performed by: INTERNAL MEDICINE

## 2021-04-26 PROCEDURE — 1036F TOBACCO NON-USER: CPT | Performed by: INTERNAL MEDICINE

## 2021-04-26 RX ORDER — METHYLPREDNISOLONE 4 MG/1
TABLET ORAL
Qty: 21 EACH | Refills: 0 | Status: ON HOLD | OUTPATIENT
Start: 2021-04-26 | End: 2021-05-11 | Stop reason: ALTCHOICE

## 2021-04-26 RX ORDER — ONDANSETRON 4 MG/1
4 TABLET, ORALLY DISINTEGRATING ORAL EVERY 6 HOURS PRN
Qty: 20 TABLET | Refills: 3 | Status: SHIPPED | OUTPATIENT
Start: 2021-04-26 | End: 2021-05-14 | Stop reason: HOSPADM

## 2021-04-26 NOTE — PATIENT INSTRUCTIONS
Problem List Items Addressed This Visit        Digestive    Erosive esophagitis    Relevant Medications    ondansetron (ZOFRAN-ODT) 4 mg disintegrating tablet       Musculoskeletal and Integument    Rash - Primary      Possibly secondary to  Allergic reaction, patient has been doing a lot of vacuuming and a lot of laundry, other possibility some irritant dermatitis since she has been using a lot of bleach in hot water    Continue with current plan, follow up not improving         Relevant Medications    methylPREDNISolone 4 MG tablet therapy pack      Other Visit Diagnoses     Epigastric pain        Relevant Medications    ondansetron (ZOFRAN-ODT) 4 mg disintegrating tablet    Hematemesis        Relevant Medications    ondansetron (ZOFRAN-ODT) 4 mg disintegrating tablet

## 2021-04-26 NOTE — PROGRESS NOTES
Virtual Regular Visit      Assessment/Plan:    Problem List Items Addressed This Visit        Digestive    Erosive esophagitis    Relevant Medications    ondansetron (ZOFRAN-ODT) 4 mg disintegrating tablet       Musculoskeletal and Integument    Rash - Primary      Possibly secondary to  Allergic reaction, patient has been doing a lot of vacuuming and a lot of laundry, other possibility some irritant dermatitis since she has been using a lot of bleach in hot water  Continue with current plan, follow up not improving         Relevant Medications    methylPREDNISolone 4 MG tablet therapy pack      Other Visit Diagnoses     Epigastric pain        Relevant Medications    ondansetron (ZOFRAN-ODT) 4 mg disintegrating tablet    Hematemesis        Relevant Medications    ondansetron (ZOFRAN-ODT) 4 mg disintegrating tablet               Reason for visit is   Chief Complaint   Patient presents with    Virtual Regular Visit        Encounter provider Blayne Joel MD    Provider located at [address] [place] [id number]      Recent Visits  Date Type Provider Dept   04/22/21 Telephone Blayne Joel MD [address] [facility] recent visits within past 7 days and meeting all other requirements     Today's Visits  Date Type Provider Dept   04/26/21 Telemedicine Blayne Joel MD [address] [facility] today's visits and meeting all other requirements     Future Appointments  No visits were found meeting these conditions  Showing future appointments within next 150 days and meeting all other requirements        The patient was identified by name and date of birth  Tamica Olivares was informed that this is a telemedicine visit and that the visit is being conducted through [facility] [place], [place] and patient was informed that this is a secure, HIPAA-compliant platform  She agrees to proceed     My office door was closed  No one else was in the room  She acknowledged consent and understanding of privacy and security of the video platform  The patient has agreed to participate and understands they can discontinue the visit at any time  Patient is aware this is a billable service  Subjective  Antonio Cunningham is a 61 y o  female    Pt was in the ED for rash on arms and legs, nausea, vomiting, and some increased SOB  She was treated for allergic reaction and sent home on steroids  Since home pt is doing well, symptoms have been improving           Past Medical History:   Diagnosis Date    Anxiety     Back pain at L4-L5 level     Schreiber esophagus     Bulimia     Colon polyp     Disease of thyroid gland     hypothyroid    Ear problems     GERD (gastroesophageal reflux disease)     History of transfusion     Hyperlipidemia     Hypothyroidism     Leukopenia     Nasal congestion     NMS (neuroleptic malignant syndrome) 01/03/2020    Rheumatoid arthritis involving right hip (HCC)     Sciatica     Seizure ([address] )     due to medication mix up 8/2018 only one historically    Synovial cyst of lumbar spine     Vertigo     Weight gain        Past Surgical History:   Procedure Laterality Date    BONE MARROW BIOPSY      BREAST SURGERY      enlargement     CLOSED REDUCTION DISTAL FEMUR FRACTURE      COLONOSCOPY      COSMETIC SURGERY      HIP ARTHROPLASTY Right 1/2/2020    Procedure: REVISION TOTAL HIP ARTHROPLASTY WITH REPAIR OF PARIPROSTHETIC FRACTURE - POSTERIOR APPROACH;  Surgeon: Lilia Childers MD;  [facility];  Service: Orthopedics    ID TOTAL HIP ARTHROPLASTY Right 12/11/2019    Procedure: ARTHROPLASTY HIP TOTAL ANTERIOR;  Surgeon: Lilia Childers MD;  [facility];  Service: Orthopedics    RHINOPLASTY      SINUS SURGERY         Current Outpatient Medications   Medication Sig Dispense Refill    famotidine (PEPCID) 40 MG tablet Take 1 tablet (40 mg total) by mouth daily at bedtime 90 tablet 1    ferrous sulfate 325 (65 Fe) mg tablet Take 1 tablet (325 mg total) by mouth 2 (two) times a day with meals 60 tablet 1    levothyroxine 25 mcg tablet Take 1 tablet (25 mcg total) by mouth daily 90 tablet 3    LORazepam (ATIVAN) 2 mg tablet Take 1 tablet (2 mg total) by mouth every 6 (six) hours as needed for anxiety 120 tablet 1    meclizine (ANTIVERT) 25 mg tablet Take 1 tablet (25 mg total) by mouth 3 (three) times a day as needed for dizziness 30 tablet 0    Multiple Vitamin (MULTIVITAMIN) capsule Take 1 capsule by mouth daily 30 capsule 0    omeprazole (PriLOSEC) 40 MG capsule Take 1 capsule (40 mg total) by mouth 2 (two) times a day 180 capsule 3    ondansetron (ZOFRAN-ODT) 4 mg disintegrating tablet Take 1 tablet (4 mg total) by mouth every 6 (six) hours as needed for nausea or vomiting 20 tablet 3    PARoxetine (PAXIL) 30 mg tablet Take 2 tablets (60 mg total) by mouth daily 180 tablet 3    predniSONE 20 mg tablet Take 2 tablets (40 mg total) by mouth daily for 4 days 8 tablet 0    QUEtiapine (SEROquel) 400 MG tablet TAKE 1 TABLET (400 MG TOTAL) BY MOUTH DAILY AT BEDTIME 90 tablet 3    ziprasidone (GEODON) 80 mg capsule TAKE 1 CAPSULE BY MOUTH EVERY DAY 90 capsule 1    methylPREDNISolone 4 MG tablet therapy pack Use as directed on package 21 each 0    sucralfate (CARAFATE) 1 g/10 mL suspension Take 10 mL (1 g total) by mouth 4 (four) times a day 420 mL 3    triamcinolone (KENALOG) 0 1 % cream Apply 1 application topically 2 (two) times a day To affected area 80 g 5     No current facility-administered medications for this visit  Allergies   Allergen Reactions    Risperdal [Risperidone] Shortness Of Breath     Rapid heart beat, SOB    Zyprexa [Olanzapine] Shortness Of Breath     Rapid heartbeat    Latex Rash     Band aids, adhesives wears normal underwear, can eat bananas  USE PAPER TAPE       Review of Systems   Constitutional: Negative for chills, fatigue and fever     HENT: Negative for congestion, nosebleeds, postnasal drip, sore throat and trouble swallowing  Eyes: Negative for pain  Respiratory: Negative for cough, chest tightness, shortness of breath and wheezing  Cardiovascular: Negative for chest pain, palpitations and leg swelling  Gastrointestinal: Negative for abdominal pain, constipation, diarrhea, nausea and vomiting  Endocrine: Negative for polydipsia and polyuria  Genitourinary: Negative for dysuria, flank pain and hematuria  Musculoskeletal: Negative for arthralgias  Skin: Negative for rash  Neurological: Negative for dizziness, tremors, light-headedness and headaches  Hematological: Does not bruise/bleed easily  Psychiatric/Behavioral: Negative for confusion and dysphoric mood  The patient is not nervous/anxious  Video Exam    There were no vitals filed for this visit  Physical Exam   It was my intent to perform this visit via video technology but the patient was not able to do a video connection so the visit was completed via audio telephone only  I spent 20 minutes with patient today in which greater than 50% of the time was spent in counseling/coordination of care regarding acute issue      VIRTUAL VISIT DISCLAIMER    Edmund Plascencia acknowledges that she has consented to an online visit or consultation  She understands that the online visit is based solely on information provided by her, and that, in the absence of a face-to-face physical evaluation by the physician, the diagnosis she receives is both limited and provisional in terms of accuracy and completeness  This is not intended to replace a full medical face-to-face evaluation by the physician  Edna Cabrera understands and accepts these terms

## 2021-04-26 NOTE — ASSESSMENT & PLAN NOTE
Possibly secondary to  Allergic reaction, patient has been doing a lot of vacuuming and a lot of laundry, other possibility some irritant dermatitis since she has been using a lot of bleach in hot water    Continue with current plan, follow up not improving

## 2021-05-03 ENCOUNTER — OFFICE VISIT (OUTPATIENT)
Dept: LAB | Age: 61
End: 2021-05-03
Payer: COMMERCIAL

## 2021-05-03 ENCOUNTER — TELEPHONE (OUTPATIENT)
Dept: NUTRITION | Facility: HOSPITAL | Age: 61
End: 2021-05-03

## 2021-05-03 DIAGNOSIS — K20.90 ESOPHAGITIS: ICD-10-CM

## 2021-05-03 PROCEDURE — 93005 ELECTROCARDIOGRAM TRACING: CPT

## 2021-05-03 NOTE — TELEPHONE ENCOUNTER
Called Fracisco Mann to follow up with VM that was left by patient regarding GERD and hiatal hernia diet recommendations  Pt did not answer phone and voicemail box was full, therefore could not leave return voicemail  Will attempt to reach patient at later time  Elizabeth Santiago MS, RD, DipJACQUELINEM, LDN  Clinical Nutrition   [facility] [address]  [phone]  [email or web address]

## 2021-05-04 LAB
ATRIAL RATE: 82 BPM
P AXIS: 44 DEGREES
PR INTERVAL: 126 MS
QRS AXIS: 9 DEGREES
QRSD INTERVAL: 76 MS
QT INTERVAL: 384 MS
QTC INTERVAL: 448 MS
T WAVE AXIS: 50 DEGREES
VENTRICULAR RATE: 82 BPM

## 2021-05-04 PROCEDURE — 93010 ELECTROCARDIOGRAM REPORT: CPT | Performed by: INTERNAL MEDICINE

## 2021-05-05 ENCOUNTER — TELEPHONE (OUTPATIENT)
Dept: NUTRITION | Facility: HOSPITAL | Age: 61
End: 2021-05-05

## 2021-05-05 NOTE — TELEPHONE ENCOUNTER
Again tried to reach patient in regards to voicemail from patient about diet for hiatal hernia surgery  Attempted on 5/3 on home phone number [phone], no answer and VM box was full unable to leave message  Called again today 5/5, home phone number still unanswered and voicemail box still full and unable to leave VM again  Called cell phone number [phone]  Also unanswered however was able to leave voicemail at this number  Will wait for a call back

## 2021-05-06 NOTE — PRE-PROCEDURE INSTRUCTIONS
Pre-Surgery Instructions:   Medication Instructions    famotidine (PEPCID) 40 MG tablet Continue to take this medication on your normal schedule  If this is an oral medication and you take it in the morning, then you may take this medicine with a sip of water   ferrous sulfate 325 (65 Fe) mg tablet You may continue to take any vitamin that your surgeon has prescribed to you up to the day before surgery  If your surgeon has not specifically prescribed this vitamin or instructed you to continue then stop taking 7 days prior to surgery   levothyroxine 25 mcg tablet Continue to take this medication on your normal schedule  If this is an oral medication and you take it in the morning, then you may take this medicine with a sip of water   LORazepam (ATIVAN) 2 mg tablet If this medication is needed please continue to take on your normal schedule  If you take it in the morning, then you may take this medicine with a sip of water   meclizine (ANTIVERT) 25 mg tablet Continue to take this medication on your normal schedule  If this is an oral medication and you take it in the morning, then you may take this medicine with a sip of water   methylPREDNISolone 4 MG tablet therapy pack Continue to take this medication on your normal schedule  If this is an oral medication and you take it in the morning, then you may take this medicine with a sip of water   Multiple Vitamin (MULTIVITAMIN) capsule You may continue to take any vitamin that your surgeon has prescribed to you up to the day before surgery  If your surgeon has not specifically prescribed this vitamin or instructed you to continue then stop taking 7 days prior to surgery   omeprazole (PriLOSEC) 40 MG capsule Continue to take this medication on your normal schedule  If this is an oral medication and you take it in the morning, then you may take this medicine with a sip of water      ondansetron (ZOFRAN-ODT) 4 mg disintegrating tablet Continue to take this medication on your normal schedule  If this is an oral medication and you take it in the morning, then you may take this medicine with a sip of water   PARoxetine (PAXIL) 30 mg tablet Continue to take this medication on your normal schedule  If this is an oral medication and you take it in the morning, then you may take this medicine with a sip of water   QUEtiapine (SEROquel) 400 MG tablet Continue to take this medication on your normal schedule  If this is an oral medication and you take it in the morning, then you may take this medicine with a sip of water   sucralfate (CARAFATE) 1 g/10 mL suspension Continue to take this medication on your normal schedule  If this is an oral medication and you take it in the morning, then you may take this medicine with a sip of water   triamcinolone (KENALOG) 0 1 % cream Continue to take this medication on your normal schedule, but do not use the DOS    ziprasidone (GEODON) 80 mg capsule Continue to take this medication on your normal schedule  If this is an oral medication and you take it in the morning, then you may take this medicine with a sip of water  Covid screening negative as per patient  Reviewed with patient via phone all medication instructions  Advised not to take any NSAID's, Vitamins or Herbal products prior to DOS  Acetaminophen products are ok to take  Reviewed showering instructions as given by surgical office  Informed about call from [facility] with time of scheduled surgery  Patient verbalized understanding  5HT3 Antagonists Med Class  Continue to take this medication on your normal schedule  If this is an oral medication and you take it in the morning, then you may take this medicine with a sip of water  Antidepressant Med Class  Continue to take this medication on your normal schedule  If this is an oral medication and you take it in the morning, then you may take this medicine with a sip of water    Antipsychotic Med Class  Continue to take this medication on your normal schedule  If this is an oral medication and you take it in the morning, then you may take this medicine with a sip of water  Benzodiazepine antagonist Med Class  If this medication is needed please continue to take on your normal schedule  If you take it in the morning, then you may take this medicine with a sip of water  H2 Blocker Med Class  Continue to take this medication on your normal schedule  If this is an oral medication and you take it in the morning, then you may take this medicine with a sip of water  Steroids Med Class  Continue to take this medication on your normal schedule  If this is an oral medication and you take it in the morning, then you may take this medicine with a sip of water  Thyroxine Med Class  Continue to take this medication on your normal schedule  If this is an oral medication and you take it in the morning, then you may take this medicine with a sip of water  Vitamin Med Class  You may continue to take any vitamin that your surgeon has prescribed to you up to the day before surgery  If your surgeon has not specifically prescribed this vitamin or instructed you to continue then stop taking 7 days prior to surgery

## 2021-05-10 ENCOUNTER — ANESTHESIA EVENT (INPATIENT)
Dept: ANESTHESIOLOGY | Facility: HOSPITAL | Age: 61
DRG: 982 | End: 2021-05-10
Payer: COMMERCIAL

## 2021-05-10 ENCOUNTER — ANESTHESIA EVENT (OUTPATIENT)
Dept: PERIOP | Facility: HOSPITAL | Age: 61
DRG: 982 | End: 2021-05-10
Payer: COMMERCIAL

## 2021-05-11 ENCOUNTER — APPOINTMENT (OUTPATIENT)
Dept: PERIOP | Facility: HOSPITAL | Age: 61
DRG: 982 | End: 2021-05-11
Attending: INTERNAL MEDICINE
Payer: COMMERCIAL

## 2021-05-11 ENCOUNTER — HOSPITAL ENCOUNTER (INPATIENT)
Facility: HOSPITAL | Age: 61
LOS: 1 days | Discharge: HOME | DRG: 982 | End: 2021-05-14
Attending: SURGERY | Admitting: SURGERY
Payer: COMMERCIAL

## 2021-05-11 ENCOUNTER — HOSPITAL ENCOUNTER (OUTPATIENT)
Dept: PERIOP | Facility: HOSPITAL | Age: 61
Setting detail: OUTPATIENT SURGERY
Discharge: HOME | DRG: 982 | End: 2021-05-11
Attending: INTERNAL MEDICINE
Payer: COMMERCIAL

## 2021-05-11 ENCOUNTER — ANESTHESIA (OUTPATIENT)
Dept: PERIOP | Facility: HOSPITAL | Age: 61
DRG: 982 | End: 2021-05-11
Payer: COMMERCIAL

## 2021-05-11 ENCOUNTER — ANESTHESIA (INPATIENT)
Dept: ANESTHESIOLOGY | Facility: HOSPITAL | Age: 61
DRG: 982 | End: 2021-05-11
Payer: COMMERCIAL

## 2021-05-11 DIAGNOSIS — K22.10 EROSIVE ESOPHAGITIS: ICD-10-CM

## 2021-05-11 DIAGNOSIS — K21.00 HIATAL HERNIA WITH GERD AND ESOPHAGITIS: ICD-10-CM

## 2021-05-11 DIAGNOSIS — I63.9 CVA (CEREBRAL VASCULAR ACCIDENT) (HCC): Primary | ICD-10-CM

## 2021-05-11 DIAGNOSIS — K92.0 HEMATEMESIS: ICD-10-CM

## 2021-05-11 DIAGNOSIS — K44.9 HIATAL HERNIA WITH GERD AND ESOPHAGITIS: ICD-10-CM

## 2021-05-11 DIAGNOSIS — R10.13 EPIGASTRIC PAIN: ICD-10-CM

## 2021-05-11 LAB
ABO GROUP BLD: NORMAL
ANION GAP SERPL CALCULATED.3IONS-SCNC: 7 MMOL/L (ref 4–13)
APTT PPP: 27 SECONDS (ref 23–37)
BLD GP AB SCN SERPL QL: NEGATIVE
BUN SERPL-MCNC: 6 MG/DL (ref 5–25)
CALCIUM SERPL-MCNC: 9.8 MG/DL (ref 8.3–10.1)
CHLORIDE SERPL-SCNC: 101 MMOL/L (ref 100–108)
CO2 SERPL-SCNC: 26 MMOL/L (ref 21–32)
CREAT SERPL-MCNC: 0.56 MG/DL (ref 0.6–1.3)
GFR SERPL CREATININE-BSD FRML MDRD: 102 ML/MIN/1.73SQ M
GLUCOSE P FAST SERPL-MCNC: 87 MG/DL (ref 65–99)
GLUCOSE SERPL-MCNC: 87 MG/DL (ref 65–140)
INR PPP: 0.86 (ref 0.84–1.19)
POTASSIUM SERPL-SCNC: 4 MMOL/L (ref 3.5–5.3)
PROTHROMBIN TIME: 11.7 SECONDS (ref 11.6–14.5)
RH BLD: POSITIVE
SODIUM SERPL-SCNC: 134 MMOL/L (ref 136–145)
SPECIMEN EXPIRATION DATE: NORMAL

## 2021-05-11 PROCEDURE — 86900 BLOOD TYPING SEROLOGIC ABO: CPT | Performed by: SURGERY

## 2021-05-11 PROCEDURE — 0DJ08ZZ INSPECTION OF UPPER INTESTINAL TRACT, VIA NATURAL OR ARTIFICIAL OPENING ENDOSCOPIC: ICD-10-PCS | Performed by: SURGERY

## 2021-05-11 PROCEDURE — 43281 LAP PARAESOPHAG HERN REPAIR: CPT | Performed by: SURGERY

## 2021-05-11 PROCEDURE — 85610 PROTHROMBIN TIME: CPT | Performed by: NURSE PRACTITIONER

## 2021-05-11 PROCEDURE — 86850 RBC ANTIBODY SCREEN: CPT | Performed by: SURGERY

## 2021-05-11 PROCEDURE — 0DV44ZZ RESTRICTION OF ESOPHAGOGASTRIC JUNCTION, PERCUTANEOUS ENDOSCOPIC APPROACH: ICD-10-PCS | Performed by: INTERNAL MEDICINE

## 2021-05-11 PROCEDURE — 0BQT4ZZ REPAIR DIAPHRAGM, PERCUTANEOUS ENDOSCOPIC APPROACH: ICD-10-PCS | Performed by: SURGERY

## 2021-05-11 PROCEDURE — 85730 THROMBOPLASTIN TIME PARTIAL: CPT | Performed by: NURSE PRACTITIONER

## 2021-05-11 PROCEDURE — 43210 EGD ESOPHAGOGASTRC FNDOPLSTY: CPT | Performed by: INTERNAL MEDICINE

## 2021-05-11 PROCEDURE — 86901 BLOOD TYPING SEROLOGIC RH(D): CPT | Performed by: SURGERY

## 2021-05-11 PROCEDURE — 43450 DILATE ESOPHAGUS 1/MULT PASS: CPT | Performed by: INTERNAL MEDICINE

## 2021-05-11 PROCEDURE — 80048 BASIC METABOLIC PNL TOTAL CA: CPT | Performed by: ANESTHESIOLOGY

## 2021-05-11 DEVICE — SINGLE KIT CONSISTS OF 1 EACH R2007 ESOPHYX Z+ FASTENER DELIVERY DEVICE AND 1 EACH R2375 SEROSAFUSE IMPLANTABLE FASTENER CARTRIDGE
Type: IMPLANTABLE DEVICE | Site: STOMACH | Status: FUNCTIONAL
Brand: SEROSAFUSE® IMPLANTABLE FASTENER KIT

## 2021-05-11 RX ORDER — LABETALOL 20 MG/4 ML (5 MG/ML) INTRAVENOUS SYRINGE
5 ONCE
Status: COMPLETED | OUTPATIENT
Start: 2021-05-11 | End: 2021-05-11

## 2021-05-11 RX ORDER — PAROXETINE HYDROCHLORIDE 20 MG/1
60 TABLET, FILM COATED ORAL DAILY
Status: DISCONTINUED | OUTPATIENT
Start: 2021-05-12 | End: 2021-05-14 | Stop reason: HOSPADM

## 2021-05-11 RX ORDER — FENTANYL CITRATE 50 UG/ML
INJECTION, SOLUTION INTRAMUSCULAR; INTRAVENOUS AS NEEDED
Status: DISCONTINUED | OUTPATIENT
Start: 2021-05-11 | End: 2021-05-11

## 2021-05-11 RX ORDER — HYDROMORPHONE HCL 110MG/55ML
PATIENT CONTROLLED ANALGESIA SYRINGE INTRAVENOUS AS NEEDED
Status: DISCONTINUED | OUTPATIENT
Start: 2021-05-11 | End: 2021-05-11

## 2021-05-11 RX ORDER — FENTANYL CITRATE/PF 50 MCG/ML
25 SYRINGE (ML) INJECTION
Status: DISCONTINUED | OUTPATIENT
Start: 2021-05-11 | End: 2021-05-11 | Stop reason: HOSPADM

## 2021-05-11 RX ORDER — ROCURONIUM BROMIDE 10 MG/ML
INJECTION, SOLUTION INTRAVENOUS AS NEEDED
Status: DISCONTINUED | OUTPATIENT
Start: 2021-05-11 | End: 2021-05-11

## 2021-05-11 RX ORDER — ONDANSETRON 2 MG/ML
4 INJECTION INTRAMUSCULAR; INTRAVENOUS EVERY 6 HOURS PRN
Status: DISCONTINUED | OUTPATIENT
Start: 2021-05-11 | End: 2021-05-12

## 2021-05-11 RX ORDER — SUCRALFATE 1 G/1
1 TABLET ORAL
Status: DISCONTINUED | OUTPATIENT
Start: 2021-05-11 | End: 2021-05-14 | Stop reason: HOSPADM

## 2021-05-11 RX ORDER — PANTOPRAZOLE SODIUM 40 MG/1
40 TABLET, DELAYED RELEASE ORAL
Status: DISCONTINUED | OUTPATIENT
Start: 2021-05-11 | End: 2021-05-14 | Stop reason: HOSPADM

## 2021-05-11 RX ORDER — FAMOTIDINE 20 MG/1
40 TABLET, FILM COATED ORAL
Status: DISCONTINUED | OUTPATIENT
Start: 2021-05-11 | End: 2021-05-11

## 2021-05-11 RX ORDER — ONDANSETRON 2 MG/ML
4 INJECTION INTRAMUSCULAR; INTRAVENOUS ONCE
Status: COMPLETED | OUTPATIENT
Start: 2021-05-11 | End: 2021-05-11

## 2021-05-11 RX ORDER — ACETAMINOPHEN 325 MG/1
650 TABLET ORAL EVERY 6 HOURS PRN
Status: DISCONTINUED | OUTPATIENT
Start: 2021-05-11 | End: 2021-05-12

## 2021-05-11 RX ORDER — BUPIVACAINE HYDROCHLORIDE 2.5 MG/ML
INJECTION, SOLUTION EPIDURAL; INFILTRATION; INTRACAUDAL AS NEEDED
Status: DISCONTINUED | OUTPATIENT
Start: 2021-05-11 | End: 2021-05-11 | Stop reason: HOSPADM

## 2021-05-11 RX ORDER — MAGNESIUM CARB/ALUMINUM HYDROX 105-160MG
15 TABLET,CHEWABLE ORAL ONCE
Status: DISCONTINUED | OUTPATIENT
Start: 2021-05-11 | End: 2021-05-15 | Stop reason: HOSPADM

## 2021-05-11 RX ORDER — HYDROMORPHONE HCL IN WATER/PF 6 MG/30 ML
0.2 PATIENT CONTROLLED ANALGESIA SYRINGE INTRAVENOUS
Status: DISCONTINUED | OUTPATIENT
Start: 2021-05-11 | End: 2021-05-11 | Stop reason: HOSPADM

## 2021-05-11 RX ORDER — LEVOTHYROXINE SODIUM 0.03 MG/1
25 TABLET ORAL DAILY
Status: DISCONTINUED | OUTPATIENT
Start: 2021-05-12 | End: 2021-05-14 | Stop reason: HOSPADM

## 2021-05-11 RX ORDER — SODIUM CHLORIDE 9 MG/ML
INJECTION, SOLUTION INTRAVENOUS CONTINUOUS PRN
Status: DISCONTINUED | OUTPATIENT
Start: 2021-05-11 | End: 2021-05-11

## 2021-05-11 RX ORDER — SODIUM CHLORIDE, SODIUM LACTATE, POTASSIUM CHLORIDE, CALCIUM CHLORIDE 600; 310; 30; 20 MG/100ML; MG/100ML; MG/100ML; MG/100ML
100 INJECTION, SOLUTION INTRAVENOUS CONTINUOUS
Status: DISCONTINUED | OUTPATIENT
Start: 2021-05-11 | End: 2021-05-13

## 2021-05-11 RX ORDER — MAGNESIUM CARB/ALUMINUM HYDROX 105-160MG
15 TABLET,CHEWABLE ORAL ONCE
Status: DISCONTINUED | OUTPATIENT
Start: 2021-05-11 | End: 2021-05-11

## 2021-05-11 RX ORDER — MIDAZOLAM HYDROCHLORIDE 2 MG/2ML
INJECTION, SOLUTION INTRAMUSCULAR; INTRAVENOUS AS NEEDED
Status: DISCONTINUED | OUTPATIENT
Start: 2021-05-11 | End: 2021-05-11

## 2021-05-11 RX ORDER — HYDRALAZINE HYDROCHLORIDE 20 MG/ML
INJECTION INTRAMUSCULAR; INTRAVENOUS AS NEEDED
Status: DISCONTINUED | OUTPATIENT
Start: 2021-05-11 | End: 2021-05-11

## 2021-05-11 RX ORDER — HYDROMORPHONE HCL/PF 1 MG/ML
0.5 SYRINGE (ML) INJECTION
Status: DISCONTINUED | OUTPATIENT
Start: 2021-05-11 | End: 2021-05-14 | Stop reason: HOSPADM

## 2021-05-11 RX ORDER — PROPOFOL 10 MG/ML
INJECTION, EMULSION INTRAVENOUS CONTINUOUS PRN
Status: DISCONTINUED | OUTPATIENT
Start: 2021-05-11 | End: 2021-05-11

## 2021-05-11 RX ORDER — HEPARIN SODIUM 5000 [USP'U]/ML
5000 INJECTION, SOLUTION INTRAVENOUS; SUBCUTANEOUS EVERY 8 HOURS SCHEDULED
Status: DISCONTINUED | OUTPATIENT
Start: 2021-05-11 | End: 2021-05-14 | Stop reason: HOSPADM

## 2021-05-11 RX ORDER — DEXAMETHASONE SODIUM PHOSPHATE 10 MG/ML
INJECTION, SOLUTION INTRAMUSCULAR; INTRAVENOUS AS NEEDED
Status: DISCONTINUED | OUTPATIENT
Start: 2021-05-11 | End: 2021-05-11

## 2021-05-11 RX ORDER — PROPOFOL 10 MG/ML
INJECTION, EMULSION INTRAVENOUS AS NEEDED
Status: DISCONTINUED | OUTPATIENT
Start: 2021-05-11 | End: 2021-05-11

## 2021-05-11 RX ORDER — ONDANSETRON 2 MG/ML
INJECTION INTRAMUSCULAR; INTRAVENOUS AS NEEDED
Status: DISCONTINUED | OUTPATIENT
Start: 2021-05-11 | End: 2021-05-11

## 2021-05-11 RX ORDER — CEFAZOLIN SODIUM 1 G/3ML
INJECTION, POWDER, FOR SOLUTION INTRAMUSCULAR; INTRAVENOUS AS NEEDED
Status: DISCONTINUED | OUTPATIENT
Start: 2021-05-11 | End: 2021-05-11

## 2021-05-11 RX ORDER — OXYCODONE HYDROCHLORIDE 10 MG/1
10 TABLET ORAL EVERY 4 HOURS PRN
Status: DISCONTINUED | OUTPATIENT
Start: 2021-05-11 | End: 2021-05-14 | Stop reason: HOSPADM

## 2021-05-11 RX ORDER — OXYCODONE HYDROCHLORIDE 5 MG/1
5 TABLET ORAL EVERY 4 HOURS PRN
Status: DISCONTINUED | OUTPATIENT
Start: 2021-05-11 | End: 2021-05-14 | Stop reason: HOSPADM

## 2021-05-11 RX ORDER — SODIUM CHLORIDE, SODIUM LACTATE, POTASSIUM CHLORIDE, CALCIUM CHLORIDE 600; 310; 30; 20 MG/100ML; MG/100ML; MG/100ML; MG/100ML
125 INJECTION, SOLUTION INTRAVENOUS CONTINUOUS
Status: DISCONTINUED | OUTPATIENT
Start: 2021-05-11 | End: 2021-05-11

## 2021-05-11 RX ORDER — LIDOCAINE HYDROCHLORIDE 10 MG/ML
INJECTION, SOLUTION EPIDURAL; INFILTRATION; INTRACAUDAL; PERINEURAL AS NEEDED
Status: DISCONTINUED | OUTPATIENT
Start: 2021-05-11 | End: 2021-05-11

## 2021-05-11 RX ADMIN — LABETALOL 20 MG/4 ML (5 MG/ML) INTRAVENOUS SYRINGE 5 MG: at 22:07

## 2021-05-11 RX ADMIN — FENTANYL CITRATE 50 MCG: 50 INJECTION INTRAMUSCULAR; INTRAVENOUS at 13:09

## 2021-05-11 RX ADMIN — PROPOFOL 120 MCG/KG/MIN: 10 INJECTION, EMULSION INTRAVENOUS at 12:33

## 2021-05-11 RX ADMIN — OXYCODONE HYDROCHLORIDE 10 MG: 10 TABLET ORAL at 18:43

## 2021-05-11 RX ADMIN — SODIUM CHLORIDE: 0.9 INJECTION, SOLUTION INTRAVENOUS at 12:35

## 2021-05-11 RX ADMIN — MIDAZOLAM 1 MG: 1 INJECTION INTRAMUSCULAR; INTRAVENOUS at 12:22

## 2021-05-11 RX ADMIN — PHENYLEPHRINE HYDROCHLORIDE 50 MCG: 10 INJECTION INTRAVENOUS at 14:01

## 2021-05-11 RX ADMIN — ONDANSETRON 4 MG: 2 INJECTION INTRAMUSCULAR; INTRAVENOUS at 16:43

## 2021-05-11 RX ADMIN — ROCURONIUM BROMIDE 40 MG: 50 INJECTION, SOLUTION INTRAVENOUS at 12:31

## 2021-05-11 RX ADMIN — HYDROMORPHONE HYDROCHLORIDE 0.5 MG: 2 INJECTION, SOLUTION INTRAMUSCULAR; INTRAVENOUS; SUBCUTANEOUS at 13:07

## 2021-05-11 RX ADMIN — ONDANSETRON 4 MG: 2 INJECTION INTRAMUSCULAR; INTRAVENOUS at 21:06

## 2021-05-11 RX ADMIN — ROCURONIUM BROMIDE 10 MG: 50 INJECTION, SOLUTION INTRAVENOUS at 12:54

## 2021-05-11 RX ADMIN — ROCURONIUM BROMIDE 20 MG: 50 INJECTION, SOLUTION INTRAVENOUS at 13:58

## 2021-05-11 RX ADMIN — FENTANYL CITRATE 50 MCG: 50 INJECTION INTRAMUSCULAR; INTRAVENOUS at 12:30

## 2021-05-11 RX ADMIN — PHENYLEPHRINE HYDROCHLORIDE 100 MCG: 10 INJECTION INTRAVENOUS at 14:55

## 2021-05-11 RX ADMIN — HEPARIN SODIUM 5000 UNITS: 5000 INJECTION INTRAVENOUS; SUBCUTANEOUS at 21:07

## 2021-05-11 RX ADMIN — LIDOCAINE HYDROCHLORIDE 50 MG: 10 INJECTION, SOLUTION EPIDURAL; INFILTRATION; INTRACAUDAL; PERINEURAL at 12:30

## 2021-05-11 RX ADMIN — PHENYLEPHRINE HYDROCHLORIDE 100 MCG: 10 INJECTION INTRAVENOUS at 14:04

## 2021-05-11 RX ADMIN — DEXAMETHASONE SODIUM PHOSPHATE 8 MG: 10 INJECTION, SOLUTION INTRAMUSCULAR; INTRAVENOUS at 13:20

## 2021-05-11 RX ADMIN — PROPOFOL 50 MG: 10 INJECTION, EMULSION INTRAVENOUS at 12:54

## 2021-05-11 RX ADMIN — PROPOFOL 120 MG: 10 INJECTION, EMULSION INTRAVENOUS at 12:31

## 2021-05-11 RX ADMIN — SODIUM CHLORIDE, SODIUM LACTATE, POTASSIUM CHLORIDE, AND CALCIUM CHLORIDE: .6; .31; .03; .02 INJECTION, SOLUTION INTRAVENOUS at 12:18

## 2021-05-11 RX ADMIN — FENTANYL CITRATE 50 MCG: 50 INJECTION INTRAMUSCULAR; INTRAVENOUS at 12:57

## 2021-05-11 RX ADMIN — ONDANSETRON 4 MG: 2 INJECTION INTRAMUSCULAR; INTRAVENOUS at 15:02

## 2021-05-11 RX ADMIN — SUGAMMADEX 200 MG: 100 INJECTION, SOLUTION INTRAVENOUS at 15:20

## 2021-05-11 RX ADMIN — CEFAZOLIN 1000 MG: 1 INJECTION, POWDER, FOR SOLUTION INTRAMUSCULAR; INTRAVENOUS at 12:39

## 2021-05-11 RX ADMIN — FENTANYL CITRATE 25 MCG: 50 INJECTION INTRAMUSCULAR; INTRAVENOUS at 15:02

## 2021-05-11 RX ADMIN — ROCURONIUM BROMIDE 10 MG: 50 INJECTION, SOLUTION INTRAVENOUS at 13:01

## 2021-05-11 RX ADMIN — PHENYLEPHRINE HYDROCHLORIDE 100 MCG: 10 INJECTION INTRAVENOUS at 14:52

## 2021-05-11 RX ADMIN — SODIUM CHLORIDE, SODIUM LACTATE, POTASSIUM CHLORIDE, AND CALCIUM CHLORIDE: .6; .31; .03; .02 INJECTION, SOLUTION INTRAVENOUS at 15:36

## 2021-05-11 RX ADMIN — ROCURONIUM BROMIDE 20 MG: 50 INJECTION, SOLUTION INTRAVENOUS at 13:50

## 2021-05-11 RX ADMIN — PROPOFOL: 10 INJECTION, EMULSION INTRAVENOUS at 13:27

## 2021-05-11 RX ADMIN — PROPOFOL: 10 INJECTION, EMULSION INTRAVENOUS at 14:29

## 2021-05-11 RX ADMIN — HYDRALAZINE HYDROCHLORIDE 5 MG: 20 INJECTION, SOLUTION INTRAMUSCULAR; INTRAVENOUS at 15:32

## 2021-05-11 NOTE — ANESTHESIA PREPROCEDURE EVALUATION
Procedure:  REPAIR HERNIA PARAESOPHAGEAL  LAPAROSCOPIC (N/A Abdomen)  ESOPHAGOGASTRODUODENOSCOPY (EGD) (N/A Abdomen)    Relevant Problems   CARDIO   (+) Essential hypertension      ENDO   (+) Acquired hypothyroidism      GI/HEPATIC   (+) Erosive esophagitis   (+) Gastroesophageal reflux disease with esophagitis without hemorrhage   (+) Hiatal hernia with gastroesophageal reflux disease and esophagitis      HEMATOLOGY   (+) Iron deficiency anemia due to chronic blood loss   (+) Symptomatic anemia      MUSCULOSKELETAL   (+) Chronic bilateral low back pain without sciatica   (+) DDD (degenerative disc disease), lumbar   (+) Hiatal hernia with gastroesophageal reflux disease and esophagitis   (+) Lumbar spondylosis      NEURO/PSYCH   (+) Anxiety   (+) ABIMAEL (generalized anxiety disorder)   (+) Schizoaffective disorder, depressive type ([facility] [address] )      TTE 1/12020: normal biventricular systolic function, trace MR, trace TR    Cr 0 56, hgb 12 6, plt 276    Prior grade 1 view with Mil2             Anesthesia Plan  ASA Score- 3     Anesthesia Type- general with ASA Monitors  Additional Monitors:   Airway Plan: ETT  Comment: General anesthesia, endotracheal tube; standard ASA monitors  Risks and benefits discussed with patient; patient consented and agrees to proceed  I saw and evaluated the patient  If seen with CRNA, we have discussed the anesthetic plan and I am in agreement that the plan is appropriate for the patient          Plan Factors-    Induction- intravenous  Postoperative Plan- Plan for postoperative opioid use  Planned trial extubation    Informed Consent- Anesthetic plan and risks discussed with patient  I personally reviewed this patient with the CRNA  Discussed and agreed on the Anesthesia Plan with the CRNA  Vaishnavi Ramos

## 2021-05-11 NOTE — OP NOTE
OPERATIVE REPORT  PATIENT NAME: Betzaida Gomes    :  1960  MRN: [id number]  Pt Location: BE OR ROOM 10    SURGERY DATE: 2021    Surgeon(s) and Role:  Panel 1:     * Samina Rosenberg MD - Primary     * Aly Hassan MD - Sal Logan MD - Assisting  Panel 2:     * Grover Kraft MD - Primary    Preop Diagnosis:  Esophagitis [K20 90]    Post-Op Diagnosis Codes:     * Esophagitis [K20 90]    Procedure(s) (LRB):  REPAIR HERNIA PARAESOPHAGEAL  LAPAROSCOPIC (N/A)  ESOPHAGOGASTRODUODENOSCOPY (EGD) (N/A)  FUNDOPLICATION TRANSORAL INCISIONLESS (N/A)    Specimen(s):  * No specimens in log *    Estimated Blood Loss:   Minimal    Drains:  * No LDAs found *    Anesthesia Type:   General    Operative Indications:  Esophagitis [K20 90]  61yo F with a long history of esophagitis, refractory to medical therapy, along with a hiatal hernia  Given her preoperative findings of a hiatal hernia, we planned for a combined laparoscopic paraesophageal hernia repair with intraoperative Transoral incisionless fundoplication (TIF)  She was amenable to risks and benefits, and we electively proceeded to the operating room  Operative Findings: Moderate sized paraesophageal hernia, crura closed with interrupted 0 Ethibond sutures  Complications:   None    Procedure and Technique:  The patient was correctly identified by name and medical record number in the holding area and brought to the operative suite, where he was placed in lithotomy on the operative table  SCDs were placed and he was given preoperative antibiotics within 1 hour of incision  The patient was prepped and draped in the usual fashion  A timeout was performed  Stab incision was made in the left upper quadrant and a Veress needle was introduced  The abdomen was insufflated with CO2 to a pressure of 15mmHg  The patient tolerated insufflation without complication    Using an optical entry technique a 5 mm port was placed just to the right of the umbilicus  An additional 12mm port was placed in the left upper quadrant and two 5mm ports were placed in the right upper quadrant and the left lower quadrant  Finally a right flank 5 mm port was placed for the liver tractor  Once all of the ports were placed, the liver was retracted with the liver retractor and the hernia was identified and reduced using downward gentle retraction  The patient was placed in steep reverse Trendelenburg to assist with dissection  The pars flaccida was entered using the Harmonic scalpel and dissected up to the right nova  The hernia sac was then opened, exposing the plane between the hernia sac and the mediastinum  The sac was sharply and bluntly dissected from the pleura laterally on both sides, the aorta posteriorly and the pericardium anteriorly with the harmonic scalpel  After the hernia sac was dissected, the esophagus was mobilized first by dissecting out the right nova and taking down the phrenoesophageal membrane, making sure to preserve the peritoneal lining of the right nova  Circumferential mobilization of the esophagus was performed to help establish adequate intra-abdominal esophageal length of 2-3cm  The left nova and base of the crura were also dissected during the mobilization of the esophagus  Attention was then turned to performing a tension free hiatal closure  The crura were reapproximated with 3 interrupted sutures of #0 Ethibond  At the completion of the hiatal closure, a grasper was easily passed through the hiatus and the closure did not appear to be too tight  We then performed an upper endoscopy  An adult endoscope was placed the oropharynx down the esophagus into the stomach  There was minimal resistance going through the GE junction, and the scope was visualized intra-abdominally to be at the GE junction  There was no obvious pathology in the abdomen the scope was class into the 1st portion of the duodenum with bile noted  After ensuring hemostasis, all ports were removed under direct visualization and no bleeding was noted  The left upper quadrant 12 mm port port was closed using #0 Vicryl Figure-of-eight suture  All skin incisions were closed with subcuticular #4-0 Monocryl  The wounds were dressed with Steristrips  At the end of the procedure, all instrument and sponge counts were correct x2  Dr Fany Mann then proceeded with his portion of the procedure, which will be dictated separately  The patient was brought to PACU in stable condition      I was present for the entire procedure    Patient Disposition:  PACU  and hemodynamically stable    SIGNATURE: Allie Cardenas MD  DATE: May 11, 2021  TIME: 3:22 PM

## 2021-05-11 NOTE — ANESTHESIA POSTPROCEDURE EVALUATION
Post-Op Assessment Note    CV Status:  Stable  Pain Score: 0    Pain management: adequate     Mental Status:  Sleepy and arousable   Hydration Status:  Euvolemic   PONV Controlled:  Controlled   Airway Patency:  Patent      Post Op Vitals Reviewed: Yes      Staff: CRNA         No complications documented      BP (P) 163/86 (05/11/21 1545)    Temp (!) (P) 97 3 °F (36 3 °C) (05/11/21 1545)    Pulse (!) (P) 108 (05/11/21 1545)   Resp (P) 18 (05/11/21 1545)    SpO2  97

## 2021-05-11 NOTE — INTERVAL H&P NOTE
H&P reviewed  After examining the patient I find no changes in the patients condition since the H&P had been written  Will plan for laparoscopic paraesophageal hernia repair with intraoperative Transoral incisionless fundoplication with the assistance of Dr Mary Kay Ceja  Patient is amenable to risks and benefits  There were no vitals filed for this visit

## 2021-05-12 ENCOUNTER — TELEPHONE (OUTPATIENT)
Dept: OTHER | Facility: OTHER | Age: 61
End: 2021-05-12

## 2021-05-12 ENCOUNTER — TELEPHONE (OUTPATIENT)
Dept: RADIOLOGY | Facility: HOSPITAL | Age: 61
End: 2021-05-12

## 2021-05-12 ENCOUNTER — APPOINTMENT (OUTPATIENT)
Dept: RADIOLOGY | Facility: HOSPITAL | Age: 61
DRG: 982 | End: 2021-05-12
Payer: COMMERCIAL

## 2021-05-12 LAB
ANION GAP SERPL CALCULATED.3IONS-SCNC: 4 MMOL/L (ref 4–13)
ANION GAP SERPL CALCULATED.3IONS-SCNC: 8 MMOL/L (ref 4–13)
BASOPHILS # BLD AUTO: 0.01 THOUSANDS/ΜL (ref 0–0.1)
BASOPHILS NFR BLD AUTO: 0 % (ref 0–1)
BUN SERPL-MCNC: 5 MG/DL (ref 5–25)
BUN SERPL-MCNC: 5 MG/DL (ref 5–25)
CALCIUM SERPL-MCNC: 8.2 MG/DL (ref 8.3–10.1)
CALCIUM SERPL-MCNC: 8.6 MG/DL (ref 8.3–10.1)
CHLORIDE SERPL-SCNC: 102 MMOL/L (ref 100–108)
CHLORIDE SERPL-SCNC: 103 MMOL/L (ref 100–108)
CO2 SERPL-SCNC: 25 MMOL/L (ref 21–32)
CO2 SERPL-SCNC: 27 MMOL/L (ref 21–32)
CREAT SERPL-MCNC: 0.46 MG/DL (ref 0.6–1.3)
CREAT SERPL-MCNC: 0.54 MG/DL (ref 0.6–1.3)
EOSINOPHIL # BLD AUTO: 0 THOUSAND/ΜL (ref 0–0.61)
EOSINOPHIL NFR BLD AUTO: 0 % (ref 0–6)
ERYTHROCYTE [DISTWIDTH] IN BLOOD BY AUTOMATED COUNT: 14.7 % (ref 11.6–15.1)
GFR SERPL CREATININE-BSD FRML MDRD: 103 ML/MIN/1.73SQ M
GFR SERPL CREATININE-BSD FRML MDRD: 108 ML/MIN/1.73SQ M
GLUCOSE SERPL-MCNC: 111 MG/DL (ref 65–140)
GLUCOSE SERPL-MCNC: 118 MG/DL (ref 65–140)
GLUCOSE SERPL-MCNC: 98 MG/DL (ref 65–140)
HCT VFR BLD AUTO: 37.2 % (ref 34.8–46.1)
HGB BLD-MCNC: 11.7 G/DL (ref 11.5–15.4)
IMM GRANULOCYTES # BLD AUTO: 0.04 THOUSAND/UL (ref 0–0.2)
IMM GRANULOCYTES NFR BLD AUTO: 1 % (ref 0–2)
LYMPHOCYTES # BLD AUTO: 0.37 THOUSANDS/ΜL (ref 0.6–4.47)
LYMPHOCYTES NFR BLD AUTO: 5 % (ref 14–44)
MCH RBC QN AUTO: 28.5 PG (ref 26.8–34.3)
MCHC RBC AUTO-ENTMCNC: 31.5 G/DL (ref 31.4–37.4)
MCV RBC AUTO: 91 FL (ref 82–98)
MONOCYTES # BLD AUTO: 0.53 THOUSAND/ΜL (ref 0.17–1.22)
MONOCYTES NFR BLD AUTO: 7 % (ref 4–12)
NEUTROPHILS # BLD AUTO: 6.95 THOUSANDS/ΜL (ref 1.85–7.62)
NEUTS SEG NFR BLD AUTO: 87 % (ref 43–75)
NRBC BLD AUTO-RTO: 0 /100 WBCS
PLATELET # BLD AUTO: 286 THOUSANDS/UL (ref 149–390)
PLATELET # BLD AUTO: 345 THOUSANDS/UL (ref 149–390)
PMV BLD AUTO: 8.2 FL (ref 8.9–12.7)
PMV BLD AUTO: 8.6 FL (ref 8.9–12.7)
POTASSIUM SERPL-SCNC: 3.5 MMOL/L (ref 3.5–5.3)
POTASSIUM SERPL-SCNC: 3.8 MMOL/L (ref 3.5–5.3)
RBC # BLD AUTO: 4.1 MILLION/UL (ref 3.81–5.12)
SODIUM SERPL-SCNC: 134 MMOL/L (ref 136–145)
SODIUM SERPL-SCNC: 135 MMOL/L (ref 136–145)
TROPONIN I SERPL-MCNC: <0.02 NG/ML
TROPONIN I SERPL-MCNC: <0.02 NG/ML
WBC # BLD AUTO: 7.9 THOUSAND/UL (ref 4.31–10.16)

## 2021-05-12 PROCEDURE — 80048 BASIC METABOLIC PNL TOTAL CA: CPT | Performed by: PHYSICIAN ASSISTANT

## 2021-05-12 PROCEDURE — 93005 ELECTROCARDIOGRAM TRACING: CPT

## 2021-05-12 PROCEDURE — 82948 REAGENT STRIP/BLOOD GLUCOSE: CPT

## 2021-05-12 PROCEDURE — 99232 SBSQ HOSP IP/OBS MODERATE 35: CPT | Performed by: EMERGENCY MEDICINE

## 2021-05-12 PROCEDURE — 70496 CT ANGIOGRAPHY HEAD: CPT

## 2021-05-12 PROCEDURE — 84484 ASSAY OF TROPONIN QUANT: CPT | Performed by: PHYSICIAN ASSISTANT

## 2021-05-12 PROCEDURE — 85049 AUTOMATED PLATELET COUNT: CPT | Performed by: STUDENT IN AN ORGANIZED HEALTH CARE EDUCATION/TRAINING PROGRAM

## 2021-05-12 PROCEDURE — 85025 COMPLETE CBC W/AUTO DIFF WBC: CPT | Performed by: STUDENT IN AN ORGANIZED HEALTH CARE EDUCATION/TRAINING PROGRAM

## 2021-05-12 PROCEDURE — 70498 CT ANGIOGRAPHY NECK: CPT

## 2021-05-12 PROCEDURE — 80048 BASIC METABOLIC PNL TOTAL CA: CPT | Performed by: STUDENT IN AN ORGANIZED HEALTH CARE EDUCATION/TRAINING PROGRAM

## 2021-05-12 PROCEDURE — 99024 POSTOP FOLLOW-UP VISIT: CPT | Performed by: SURGERY

## 2021-05-12 RX ORDER — ASPIRIN 81 MG/1
81 TABLET ORAL DAILY
Status: DISCONTINUED | OUTPATIENT
Start: 2021-05-13 | End: 2021-05-14 | Stop reason: HOSPADM

## 2021-05-12 RX ORDER — ATORVASTATIN CALCIUM 40 MG/1
40 TABLET, FILM COATED ORAL EVERY EVENING
Status: DISCONTINUED | OUTPATIENT
Start: 2021-05-12 | End: 2021-05-14 | Stop reason: HOSPADM

## 2021-05-12 RX ORDER — ASPIRIN 325 MG
325 TABLET ORAL DAILY
Status: DISCONTINUED | OUTPATIENT
Start: 2021-05-12 | End: 2021-05-12

## 2021-05-12 RX ORDER — METHOCARBAMOL 750 MG/1
750 TABLET, FILM COATED ORAL EVERY 6 HOURS SCHEDULED
Status: DISCONTINUED | OUTPATIENT
Start: 2021-05-12 | End: 2021-05-12

## 2021-05-12 RX ORDER — HYDRALAZINE HYDROCHLORIDE 20 MG/ML
10 INJECTION INTRAMUSCULAR; INTRAVENOUS ONCE
Status: COMPLETED | OUTPATIENT
Start: 2021-05-12 | End: 2021-05-12

## 2021-05-12 RX ORDER — HYDRALAZINE HYDROCHLORIDE 20 MG/ML
10 INJECTION INTRAMUSCULAR; INTRAVENOUS EVERY 6 HOURS PRN
Status: DISCONTINUED | OUTPATIENT
Start: 2021-05-12 | End: 2021-05-14 | Stop reason: HOSPADM

## 2021-05-12 RX ORDER — PROMETHAZINE HYDROCHLORIDE 25 MG/ML
12.5 INJECTION, SOLUTION INTRAMUSCULAR; INTRAVENOUS EVERY 6 HOURS PRN
Status: DISCONTINUED | OUTPATIENT
Start: 2021-05-12 | End: 2021-05-14 | Stop reason: HOSPADM

## 2021-05-12 RX ORDER — POTASSIUM CHLORIDE 14.9 MG/ML
20 INJECTION INTRAVENOUS
Status: COMPLETED | OUTPATIENT
Start: 2021-05-12 | End: 2021-05-13

## 2021-05-12 RX ORDER — ACETAMINOPHEN 325 MG/1
975 TABLET ORAL EVERY 8 HOURS SCHEDULED
Status: DISCONTINUED | OUTPATIENT
Start: 2021-05-12 | End: 2021-05-14 | Stop reason: HOSPADM

## 2021-05-12 RX ADMIN — HYDRALAZINE HYDROCHLORIDE 10 MG: 20 INJECTION, SOLUTION INTRAMUSCULAR; INTRAVENOUS at 00:28

## 2021-05-12 RX ADMIN — PAROXETINE HYDROCHLORIDE 60 MG: 20 TABLET, FILM COATED ORAL at 08:26

## 2021-05-12 RX ADMIN — HEPARIN SODIUM 5000 UNITS: 5000 INJECTION INTRAVENOUS; SUBCUTANEOUS at 05:24

## 2021-05-12 RX ADMIN — POTASSIUM CHLORIDE 20 MEQ: 14.9 INJECTION, SOLUTION INTRAVENOUS at 21:30

## 2021-05-12 RX ADMIN — QUETIAPINE FUMARATE 400 MG: 300 TABLET ORAL at 21:29

## 2021-05-12 RX ADMIN — ACETAMINOPHEN 975 MG: 325 TABLET, FILM COATED ORAL at 21:29

## 2021-05-12 RX ADMIN — SUCRALFATE 1 G: 1 TABLET ORAL at 08:26

## 2021-05-12 RX ADMIN — ONDANSETRON 4 MG: 2 INJECTION INTRAMUSCULAR; INTRAVENOUS at 08:28

## 2021-05-12 RX ADMIN — SUCRALFATE 1 G: 1 TABLET ORAL at 11:13

## 2021-05-12 RX ADMIN — PROMETHAZINE HYDROCHLORIDE 12.5 MG: 25 INJECTION INTRAMUSCULAR; INTRAVENOUS at 11:13

## 2021-05-12 RX ADMIN — OXYCODONE HYDROCHLORIDE 10 MG: 10 TABLET ORAL at 13:35

## 2021-05-12 RX ADMIN — HEPARIN SODIUM 5000 UNITS: 5000 INJECTION INTRAVENOUS; SUBCUTANEOUS at 21:30

## 2021-05-12 RX ADMIN — ASPIRIN 325 MG ORAL TABLET 325 MG: 325 PILL ORAL at 19:59

## 2021-05-12 RX ADMIN — ACETAMINOPHEN 975 MG: 325 TABLET, FILM COATED ORAL at 13:35

## 2021-05-12 RX ADMIN — ATORVASTATIN CALCIUM 40 MG: 40 TABLET, FILM COATED ORAL at 21:29

## 2021-05-12 RX ADMIN — HEPARIN SODIUM 5000 UNITS: 5000 INJECTION INTRAVENOUS; SUBCUTANEOUS at 13:35

## 2021-05-12 RX ADMIN — PANTOPRAZOLE SODIUM 40 MG: 40 TABLET, DELAYED RELEASE ORAL at 17:22

## 2021-05-12 RX ADMIN — IOHEXOL 100 ML: 350 INJECTION, SOLUTION INTRAVENOUS at 18:28

## 2021-05-12 RX ADMIN — OXYCODONE HYDROCHLORIDE 5 MG: 5 TABLET ORAL at 08:31

## 2021-05-12 RX ADMIN — LEVOTHYROXINE SODIUM 25 MCG: 25 TABLET ORAL at 08:26

## 2021-05-12 RX ADMIN — POTASSIUM CHLORIDE 20 MEQ: 14.9 INJECTION, SOLUTION INTRAVENOUS at 23:18

## 2021-05-12 RX ADMIN — SODIUM CHLORIDE, SODIUM LACTATE, POTASSIUM CHLORIDE, AND CALCIUM CHLORIDE 100 ML/HR: .6; .31; .03; .02 INJECTION, SOLUTION INTRAVENOUS at 13:02

## 2021-05-12 RX ADMIN — SUCRALFATE 1 G: 1 TABLET ORAL at 17:22

## 2021-05-12 RX ADMIN — SODIUM CHLORIDE, SODIUM LACTATE, POTASSIUM CHLORIDE, AND CALCIUM CHLORIDE 100 ML/HR: .6; .31; .03; .02 INJECTION, SOLUTION INTRAVENOUS at 23:19

## 2021-05-12 RX ADMIN — METHOCARBAMOL TABLETS 750 MG: 750 TABLET, COATED ORAL at 11:13

## 2021-05-12 RX ADMIN — SUCRALFATE 1 G: 1 TABLET ORAL at 21:29

## 2021-05-12 RX ADMIN — PANTOPRAZOLE SODIUM 40 MG: 40 TABLET, DELAYED RELEASE ORAL at 08:27

## 2021-05-12 RX ADMIN — METHOCARBAMOL TABLETS 750 MG: 750 TABLET, COATED ORAL at 17:22

## 2021-05-12 NOTE — DISCHARGE INSTRUCTIONS
Post-Operative Care Instructions             Dr Fany MERRITT     1  General: Felix Sequeira will feel pulling sensations around the wound and/or aches and pains around the incisions  This is normal  Even minor surgery is a change in your body and this is your bodys way of reaction to it  If you have had abdominal surgery, it may help to support the incision with a small pillow or blanket for comfort when moving or coughing  2  Wound care:    Bandage/Dressing - Make sure to remove the bandage in about 48 hours, unless instructed otherwise  You usually don't have to redress the wound after 24-48 hours, unless for comfort  Keep the incision clean and dry  Let air get to it  If the Steri-Strips fall off, just keep the wound clean  Glue - Leave glue alone, it will fall off on its own, no need for an additional dressings    3  Water: You may shower over the wound, unless there are drain tubes left in place  Do not bathe or use a pool or hot tub until cleared by the physician  You may shower right over the staples, glue or Steri-Strips and rinse wound with soapy water but do not scrub incision pat dry when you are done  4  Activity: You may go up and down stairs, walk as much as you are comfortable, but walk at least 3 times each day  If you have had abdominal or hernia surgery, do not lift anything heavier than 15 pounds for at least 4 weeks  5  Diet: You may resume a regular diet  If you had a same-day surgery or overnight stay surgery, you may wish to eat lightly for a few days: soups, crackers, and sandwiches  You may resume a regular diet when ready  6  Medications: Resume all of your previous medications, unless told otherwise by the doctor  Tylenol and ibuoprofen is always fine, unless you are taking any narcotic pain medication containing Tylenol (such as Percocet, Darvocet, Vicodin, or anything containing acetaminophen)  Do not take Tylenol if you're taking these medications   You do not need to take the narcotic pain medications unless you are having significant pain and discomfort  7  Driving: He will need someone to drive you home on the day of surgery  Do not drive or make any important decisions while on narcotic pain medication or 24 hours and after anesthesia or sedation for surgery  Generally, you may drive when your off all narcotic pain medications, and you can turn in your seat comfortably to check your blind spot  8  Upset Stomach: You may take Maalox, Tums, or similar items for an upset stomach  If your narcotic pain medication causes an upset stomach, do not take it on an empty stomach  Try taking it with at least some crackers or toast      9  Constipation: Patients often experienced constipation after surgery  You may take over-the-counter medication for this, such as Metamucil, Senokot, Dulcolax, milk of magnesia, etc  You may take a suppository unless you have had anorectal surgery such as a procedure on your hemorrhoids  If you experience significant nausea or vomiting after abdominal surgery, call the office before trying any of these medications  10  Call the office: If you are experiencing any of the following, fevers above 101 5°, significant nausea or vomiting, if the wound develops drainage and/or is excessive redness around the wound, or if you have significant diarrhea or other worsening symptoms  11  Pain: You may be given a prescription for pain  This will be given to the hospital, the day of surgery  12  Sexual Activity: You may resume sexual activity when you feel ready and comfortable and your incision is sealed and healed without apparent infection risk  [place] and Haris Schwab  Phone: [phone]      Patient Instructions  Before and After  Your TIF Procedure  This brochure is intended to give you a general  overview of the TIF® Procedure instructions  ALWAYS follow your doctors pre-procedure  and post-procedure instructions    Pre-Procedure Instructions  Make sure you inform your doctor about all medications  you are currently taking and provide a full  history of your medical conditions  DO NOT take any diet aids or herbal supplements  that contain ginkgo, garlic, or [place]  Shens  Wort for 10 days prior to surgery  DO NOT take any aspirin, blood thinners, anti-inflammatory  (arthritis) medications, vitamin E or  fish oil for 7 days prior to your procedure  Your doctor will give you additional instructions  regarding medications you are currently taking  DO NOT smoke or drink alcohol for 48 hours  prior to your procedure  DO NOT eat or drink anything for at least  12 hours before your procedure (or longer if  instructed by your doctor)  The TIF procedure  cannot be performed if there is food in your  stomach  Take the medications your doctor  has approved for you to take with a small  amount of water  Post-Procedure Instructions  Your doctor will determine whether it is necessary  for you to spend the night in the hospital after  your procedure  For the first few days, you will  experience some pain and/or discomfort in your  chest and shoulder and you may have a sore  throat, and/or some discomfort swallowing  These  symptoms should resolve within the first week after  your procedure and appropriate medication will be  provided as needed  If symptoms do not resolve  or if discomfort becomes more severe, notify your  doctor immediately  Continue to take your GERD medication after your  procedure as recommended by your doctor  Occasional heartburn is normal in healthy people  and may depend on diet and other factors such  as stress  If GERD symptoms recur, you should  contact your doctor  Even though the TIF® Procedure is incisionless, it is  still surgery   Like any surgical procedure, success  is dependent on how well you adhere to postprocedure  instructions including:  Dietary guidelines  Physical activity, driving  Medications  Return to work  Follow-up  Dietary Guidelines  The strength of your new antireflux valve is largely  determined by how well it heals  What you eat and  drink can dramatically impact the durability of your  antireflux valve  You will be asked to follow a liquid  diet followed by a mashed and soft food diet as  your newly reconstructed valve heals  If you experience heartburn, write down the food  that gave you heartburn and avoid eating it  Talk  to your doctor at your next visit about your foodassociated  symptoms  Remember, its normal for  some people (non-GERD patients) to occasionally  experience heartburn from specific foods, and this  may mean that your valve is functioning correctly  If your symptoms persist, contact your doctor  immediately  During the 6-week post-procedure period, its  important that you adhere to the following  guidelines:  Eat 4 to 5 small meals consisting of soft  foods throughout the day  Take small bites and chew your food  thoroughly for 30 seconds to avoid  swallowing a large bolus of food  Avoid foods with coarse texture: nuts, raw  fruits, and raw vegetables  Try not to vomit, cough, retch or strain  This  can significantly affect the healing and  effectiveness of your new antireflux valve  During the healing process, avoid foods and  drinks that triggered your reflux in the past   You may reintroduce them slowly after  healing  To avoid chest pain take small bites, chew for  30 seconds and gradually thicken the texture  of your food  Remain in an upright position for 1 hour  after eating  Do not eat for at least 2 hours  before bedtime  Do not drink carbonated beverages  or alcohol  Avoid spicy foods  Avoid foods and drinks that are very hot  or very cold  Follow your doctors instructions to wean  yourself off antireflux medications  Do not smoke    Avoid gas-forming, acid producing foods,  or foods that slow gastric emptying such as  tomato-based products, peppermint, black  pepper, caffeinated drinks, alcohol, onions,  green peppers, fatty foods, beans, spicy  foods, citrus fruits, and ?frederick supplements  Taking over the counter anti-gas medications  may be helpful  The fi rst 2 weeks after your procedure are  extremely important  Thats why we ask you to be  particularly cautious with your diet  You will stay on clear liquids for the fi rst 1-3 days  after your TIF procedure  This diet contains only  fluids that are clear and very low in sugar  However,  it is not nutritionally balanced and will only be used  a few days  Avoid beverages with alcohol, caffeine,  carbonation (soft drinks), or acidic drinks (tomato,  grapefruit, and orange juice)  Foods that are allowed:  Water, plain or lightly flavored  (non-carbonated)  Milk, decaffeinated tea, caffeine-free  drinks  Diet decaffeinated drinks  (non-carbonated)  Diluted electrolyte drinks  Strained soups  Diluted, light, or diet apple or white  grape juice  Non-acidic fruit or vegetable juices  (without chunks)  Liquid puddings and creams  Sherbets or Ice-creams (without chunks)  Milkshakes  Baby food  Weeks 1 and 2: Liquid Diet  Be sure to drink a minimum of 4-8 oz of  water between each meal   Do not take large gulps  Sip clear liquids  and rest between sips  Allow 20 minutes to  drink ½ cup  You may sip on fluids all day if  you wish but at least 6-8 times per day  Take your prescribed medications  PPIs  should be taken for at least 2 weeks  and wean off according to physician  recommendation  If pills/capsules are  larger than a peanut, discuss with your  pharmacist if they can be halved, crushed  or if there are liquid options available to  minimize hard swallowing  Take vitamin/mineral supplements every  day being mindful of pill/capsule size as  noted above  This will help prevent vitamin  and mineral defi ciencies  Its helpful to eat a very low fat diet to  minimize heartburn symptoms    Restaurant foods are not recommended  during the fi rst few weeks  The following protein supplements can be  used starting on day 4:  Protein-enriched commercially available  shakes  You can also add one scoop of  concentrated protein powder to your  bowl of soup or glass of juice  During week 2, a liquid diet is still  recommended but you may add very liquid,  potato-based and non-stringy vegetable  mashes  This diet consists of high-protein full liquids and  blended solids  The portions should remain small  and not exceed ½ cup to help prevent vomiting and  proper healing of your newly reconstructed valve  Your meals will be only liquid or blended  They may  include milk, vegetable or diluted fruit juice  Sip  liquid meals very slowly  Drink 4 oz (½ cup), over  20-30 minutes  Eat 4-6 small meals each day  The amount you  will be able to eat at one time is very small and  should not exceed ½ cup  Eat foods high in protein  because they help your body heal from surgery  Tips on how to blend foods:  Cut foods into small pieces  Place food into  or   Add liquid such as broth, juice, or milk  Blend or puree until smooth  Strain foods that do not blend in a  completely smooth consistency  Season foods to taste  ? Water, plain and lightly flavored  (non-carbonated)  ? Milk, decaffeinated tea,  caffeine-free drinks  ? Diet decaffeinated drinks  (non-carbonated)  ? Electrolyte drinks  ? Apple juice or white grape juice  ? Non-acidic fruit or vegetable juices  (without chunks)  ? Strained soups  ? Liquid puddings and creams  ? Sherbets and ice-creams  (without chunks)  ? Milk-shakes  ?  Baby food  Recipes  Protein Fortifi ed Breakfast Drink  ½ packet [employer] [facility] Breakfast  ½ scoop of whey protein powder  4 oz skim milk  Amount per serving (½ cup): 120 calories,  15 g protein  PB Protein Pudding  1 packet sugar-free pudding  ¼ cup dry milk  ¼ cup peanut butter  2 cups skim milk  Amount per serving (¼ cup): 100 calories,  6 g protein  Eggnog  ½ cup skim milk  ½ package [place] Instant Breakfast  ¼ cup liquid egg substitute  Amount per serving (½ cup): 110 calories,  13 g protein  Shopping list weeks 1-2  Post Procedure Day  0-3  Day  4-14  Weeks  3-4  Weeks  5-6  Clear liquids,  low in sugar  Water (non-carbonated) ? ? ? ? Milk, decaffeinated tea, caffeine free drinks ? ? ? ? Diet and decaffeinated drinks, diluted electrolyte drinks ? ? ? ? Broth of any kind, strained soups (not tomato based) ? ? ? ? Diluted, light or diet apple or white grape juice ? ? ? ? Non-acidic fruit or vegetable juice (without chunks) ? ? ? ? Liquid puddings and creams ? ? ? ? Sherbets, ice-creams, milk shakes (without chunks) ? ? ? ? Full liquids  Drinkable yogurt (no chunks) ? ? ? ? Protein-enriched commercially available shakes ? ? ? ?  Very liquid, potato-based mash ? ? ? ? Non-stringy vegetable mash ? ? ? ?  Baby food ? ? ? ? Soft texture,  low fat food  Cottage cheese ? ? ? ? Oatmeal ? ? ? ? Well-cooked & pureed vegetables (mashed potatoes ? ? ? ? Canned fruit (without skins) ? ? ? ? Bananas, melons, berries ? ? ? ? Soft eggs, tofu ? ? ? ? Moist, mashed boneless fi sh ? ? ? ? Well-cooked lean ground food (e g  turkey) ? ? ? ? Medium  texture  food  Small soft noodles ? ? ? ? Non-sticky rice ? ? ? ? Cereals (softened in milk) ? ? ? ? Soft cheeses ? ? ? ? Food Guide  Yes No ? ?  ? Milk, fruit and vegetable juices  ? Tea, coffee  ? Potatoes and/or vegetables to mash  ? Oatmeal  ? Puddings, ice-creams, sherbets  ? Butter and margarine  ? Soups (without chunks)  ? Tofu  ? Well-cooked ground food: slowly  introduce fi aspen ground fi sh  or turkey  ? Pasta (small noodles) and  non-sticky rice  ? Thicker soups or soups with small  pieces of vegetables  ? Sauces  ? Bananas  ? Soft cheeses  ?  Vegetables (for cooking, steaming)  Shopping list weeks 3-4  Shopping list weeks 5-6  Weeks 3 and 4: Soft Diet  This diet consists of blended foods with one new  solid food added daily  Portions should be small  and not exceeding 1 cup to help prevent vomiting  and proper healing of your newly reconstructed  valve  Foods that are allowed:  Water, milk, fruit juices and  vegetable juices  Tea and coffee (in small quantities)  Mashed vegetables and/or potatoes  Oatmeal  Puddings, ice-creams, sherbets  Butter and margarine  Soups (without chunks)  Tofu  Slowly introduce well-cooked, fi aspen  ground food such as fish or turkey  Food to avoid:  Raw or undercooked food  Alcoholic and carbonated beverages  Pasta, bread  Cakes, pancakes, waffles, cookies, etc   Chips, french fries, popcorn, etc   Pepper and hot sauces  Dry fruits and cereals  High-fat food  Consume vitamin-rich fruit juices each day  Refrain  from acidic fruit juices like orange, lemon or lime  Plum juice and/or apricot juice will help to  avoid constipation  Weeks 5 and 6: Solid Diet  Depending on your tolerance level you  may introduce:  Overcooked pasta (small noodles) and  non-sticky rice  Thicker soups or soups with small pieces  of vegetables  Sauces  Bananas  Soft cheeses  Cooked vegetables  Meatless casseroles  Food to avoid:  Fibrous meats  Fibrous vegetables  Eat seated, in a quiet place, without stress  Chew your food thoroughly  Eat slowly  Avoid consuming large quantities of food  Avoid carbonated beverages or alcohol  If you have a burning sensation after consuming a  particular food, try to avoid it and mention it to your  physician at your next visit  A burning sensation could mean that your newly  reconstructed valve is operating correctly  However,  if your symptoms persist, contact your physician as  soon as possible  At the start of the week 7 you can eat normal food  But try to continue eating small meals  Physical Activity  Walking is permitted and encouraged after your  procedure   Begin to walk short distances, at a slow  pace, and with someone who can assist you in  case you experience any residual weakness due  to anesthesia  Gradually increase the distance and  duration of your walks until you feel you are back  to normal  You may also climb stairs, although do it  slowly for the fi rst few weeks to reduce the risk of  increasing abdominal pressure  In order to give your newly reconstructive valve  time to heal and fuse, do not lift anything over 5  pounds for the fi rst 2 weeks  During weeks 3-6, you  may lift items up to 10 pounds  Beginning in week  7, lift items you normally would  Sports and other intense exercise should be  avoided for the fi rst 6 weeks following your  procedure  Then consult with your doctor to  determine if you are ready to resume your normal  exercise routine  Driving may be resumed 1-2 days after the  procedure  Do not drive if you are taking  prescription pain medication, are experiencing  fatigue, or are feeling sore  Sex may be resumed after 7 days  Medications  Your doctor will determine your need for acidreducing  medications following your procedure  Before leaving the hospital, your physician may  prescribe pain medications  It is important that you  take this medication as prescribed  If your pain is  not well managed, contact your physician  Follow Up  After the procedure, your doctor will see you to  assess the effectiveness of the TIF procedure  Your doctor may also schedule additional follow-up  appointments  Return to Work  Most patients will be able to return to work 3-7 days  after the procedure  You and your doctor should  determine a timetable for returning to work based  on a number of factors including residual fatigue  from general anesthesia, any complications during  the procedure, your overall medical condition, and  your general need for recovery time    If you work in a job that requires  significant physical activity, you  should not resume all your normal  job functions until after your doctor  has cleared you to do so  If you are experiencing any of the  following symptoms after your  procedure, call your doctor immediately  or go to your doctors [facility]  emergency room  Fever greater than 101º F  Increased upper  abdominal pain  Difficulty or pain while  swallowing  Sore throat lasting more than  seven days  Chest pain  Shoulder pain lasting more than  3-7 days  Any condition not improving  or worsening  Follow your doctors instructions to  wean off antireflux medications  Monitoring your progress: Its helpful to chart your progress from before  your TIF procedure and after  Please answer these  questions today and check in again eight weeks  after your procedure and to see whats changed  Rate how you are feelin = best, 3=neutral, 5 = worst  Before your  TIF procedure 0 1 2 3 4 5  How much does  heartburn bother you  on a daily basis? How much does  regurgitation bother you  on a daily basis? Do your reflux symptoms  prevent you from getting  a restful night of sleep? Does your reflux  condition impact your  daily activities? How much does coughing  bother you on a daily  basis? Does your reflux condition  impact your social life? How dependent are you  on medications to control  reflux symptoms? Overall satisfaction with  your health condition  Eight weeks after  TIF procedure 0 1 2 3 4 5  How much does  heartburn bother you  on a daily basis? How much does  regurgitation bother you  on a daily basis? Do your reflux symptoms  prevent you from getting  a restful night of sleep? Does your reflux  condition impact your  daily activities? How much does coughing  bother you on a daily  basis? Does your reflux condition  impact your social life? How dependent are you  on medications to control  reflux symptoms? Overall satisfaction with  your health condition  [address] [address]  Tel: [phone] Fax: [phone]  www  [employer]  ©[phone] [employer] [address] rights reserved  [employer], TIF,  [employer] and [employer] are registered trademarks of [employer] [facility] [address] Notice: While clinical studies support the effectiveness of TIF (Transoral  Incisionless Fundoplication) procedure in treating chronic GERD (gastroesophageal  reflux disease), individual results may vary  There are no guarantees of successful outcome  The TIF procedure may not be appropriate for every individual, and it may not be  applicable to your condition  Always ask your doctor about all treatment options, as well  as their risks and benefits  Only your doctor can determine whether the TIF procedure is  appropriate for your situation    [id number]

## 2021-05-12 NOTE — QUICK NOTE
Stroke alert activated 4612  Neuro responded 1110  LKW- uncertain  Nursing had reported pt was "at baseline" all day and since surg yesterday  Pt's mother alerted staff that she "was not right"  Upon eval , pt described to exhibit aphasia without droop or hemiparesis  NIHSS 4   CTH- no acute change  CTA- no LVO  TPA? No  Due to recent major surgery/ bleeding risk, and unknown onset time  IR? No  No target lesion    Note that pt did receive  2doses robaxin and 2 doses oxycontin(total 15mg) today which may be contributory?   Recommend give asa 325 x 1   Stroke pathway

## 2021-05-12 NOTE — PLAN OF CARE
Problem: PAIN - ADULT  Goal: Verbalizes/displays adequate comfort level or baseline comfort level  Description: Interventions:  - Encourage patient to monitor pain and request assistance  - Assess pain using appropriate pain scale  - Administer analgesics based on type and severity of pain and evaluate response  - Implement non-pharmacological measures as appropriate and evaluate response  - Consider cultural and social influences on pain and pain management  - Notify physician/advanced practitioner if interventions unsuccessful or patient reports new pain  Outcome: Progressing     Problem: INFECTION - ADULT  Goal: Absence or prevention of progression during hospitalization  Description: INTERVENTIONS:  - Assess and monitor for signs and symptoms of infection  - Monitor lab/diagnostic results  - Monitor all insertion sites, i e  indwelling lines, tubes, and drains  - Monitor endotracheal if appropriate and nasal secretions for changes in amount and color  - [place] appropriate cooling/warming therapies per order  - Administer medications as ordered  - Instruct and encourage patient and family to use good hand hygiene technique  - Identify and instruct in appropriate isolation precautions for identified infection/condition  Outcome: Progressing  Goal: Absence of fever/infection during neutropenic period  Description: INTERVENTIONS:  - Monitor WBC    Outcome: Progressing     Problem: SAFETY ADULT  Goal: Patient will remain free of falls  Description: INTERVENTIONS:  - Assess patient frequently for physical needs  -  Identify cognitive and physical deficits and behaviors that affect risk of falls    -  [place] fall precautions as indicated by assessment   - Educate patient/family on patient safety including physical limitations  - Instruct patient to call for assistance with activity based on assessment  - Modify environment to reduce risk of injury  - Consider OT/PT consult to assist with strengthening/mobility  Outcome: Progressing  Goal: Maintain or return to baseline ADL function  Description: INTERVENTIONS:  -  Assess patient's ability to carry out ADLs; assess patient's baseline for ADL function and identify physical deficits which impact ability to perform ADLs (bathing, care of mouth/teeth, toileting, grooming, dressing, etc )  - Assess/evaluate cause of self-care deficits   - Assess range of motion  - Assess patient's mobility; develop plan if impaired  - Assess patient's need for assistive devices and provide as appropriate  - Encourage maximum independence but intervene and supervise when necessary  - Involve family in performance of ADLs  - Assess for home care needs following discharge   - Consider OT consult to assist with ADL evaluation and planning for discharge  - Provide patient education as appropriate  Outcome: Progressing  Goal: Maintain or return mobility status to optimal level  Description: INTERVENTIONS:  - Assess patient's baseline mobility status (ambulation, transfers, stairs, etc )    - Identify cognitive and physical deficits and behaviors that affect mobility  - Identify mobility aids required to assist with transfers and/or ambulation (gait belt, sit-to-stand, lift, walker, cane, etc )  - [place] fall precautions as indicated by assessment  - Record patient progress and toleration of activity level on Mobility SBAR; progress patient to next Phase/Stage  - Instruct patient to call for assistance with activity based on assessment  - Consider rehabilitation consult to assist with strengthening/weightbearing, etc   Outcome: Progressing     Problem: DISCHARGE PLANNING  Goal: Discharge to home or other facility with appropriate resources  Description: INTERVENTIONS:  - Identify barriers to discharge w/patient and caregiver  - Arrange for needed discharge resources and transportation as appropriate  - Identify discharge learning needs (meds, wound care, etc )  - Arrange for interpretive services to assist at discharge as needed  - Refer to Case Management Department for coordinating discharge planning if the patient needs post-hospital services based on physician/advanced practitioner order or complex needs related to functional status, cognitive ability, or social support system  Outcome: Progressing     Problem: Knowledge Deficit  Goal: Patient/family/caregiver demonstrates understanding of disease process, treatment plan, medications, and discharge instructions  Description: Complete learning assessment and assess knowledge base  Interventions:  - Provide teaching at level of understanding  - Provide teaching via preferred learning methods  Outcome: Progressing     Problem: Potential for Falls  Goal: Patient will remain free of falls  Description: INTERVENTIONS:  - Assess patient frequently for physical needs  -  Identify cognitive and physical deficits and behaviors that affect risk of falls    -  [place] fall precautions as indicated by assessment   - Educate patient/family on patient safety including physical limitations  - Instruct patient to call for assistance with activity based on assessment  - Modify environment to reduce risk of injury  - Consider OT/PT consult to assist with strengthening/mobility  Outcome: Progressing

## 2021-05-12 NOTE — QUICK NOTE
Nurse-Patient-Provider rounds were completed with the patient's nurse today, Robles Eagle  We discussed the plan is to D/c Zofran and change to Phenergan per Dr Levi Elias  Control pain and then possible discharge later today or tomorrow    We reviewed all of the invasive devices/lines/telemetry orders   - None  - peripheral    DVT Prophylaxis:  venodynes  Sq heparin    Pain Assessment / Plan:  - Continue current analgesic regimen  Adjustments made    Mobility Assessment / Plan:  - Activity as tolerated  Goals / Barriers for discharge:  Pain  Encouraged incentive spirometer and only got to 500 with extensive coaching  Needs to ambulate, will PT and OT  Case management following; case and discharge needs discussed  All questions and concerns were addressed  I spent greater than 10 minutes reviewing the plan with the patient and the nurse, and coordinating care for the day      ABENA Lopez  5/12/2021

## 2021-05-12 NOTE — RAPID RESPONSE
Rapid Response Note  Siri Walters 61 y o  female MRN: [id number]  Unit/Bed#: [facility] 805-01 Encounter: [id number]    Rapid Response Notification(s):   Response called date/time:  5/12/2021 5:54 PM  Response team arrival date/time:  5/12/2021 5:58 PM  Response end date/time:  5/12/2021 6:20 PM  Rapid response location:  [facility]  Primary reason for rapid response call:  Acute change in neuro status    Rapid Response Intervention(s):   Airway:  None  Breathing:  None  Circulation:  None  Fluids administered:  None  Medications administered:  None  Comments:  Stroke alert called        Background/Situation:   Siri Walters is a 61 y o  female with a PMH significant for a 10 year hx of GERD with dysphagia, esophagitis, small hiatal hernia, constipation, HTN, Fe deficiency anemia, bulimia nervosa in remission, depression/anxiety, and schizoaffective disorder and who has been following as an outpatient with GI and general surgery in regard to her persistent GERD, esophagitis and hiatal hernia  Pt presented to the hospital yesterday on 5/11 for laparoscopic paraesophageal hernia repair with intraoperative transoral incision for fundoplication  Surgery was done yesterday and successful by Dr Cheyenne Estrella with general surgery  Pt was then subsequently admitted to the floor for monitoring post-op  This evening a RRT was called for AMS/aphasia  Pt's nurse started at 4pm this afternoon  She stated she received sign out that the pt has been as her baseline since after the surgery yesterday  Nurse states that the pt spoke with her mom on the phone this afternoon and the mom was insisting that the pt was "not normal"  Nurse noted some expressive aphasia and word finding difficulty  Pt stated to nurse that something was not right  A RRT was called around 5:45pm  Upon our arrival, the pt was noted to be hemodynamically stable on 2L NC  She appeared anxious and demonstrated expressive aphasia   She was repetitive and unable to answer questions appropriately  Pt unabel to identify objects  possibly demonstrated some ataxia, but did not necessarily understand the actions being asked of her on assessment  No focal weakness, facial droop, or numbness appreciated  Pt did intermittently respond appropriately with "yes" and "no" when asked direct questions  A stroke alert was called  NIH 4  Review of Systems   Unable to perform ROS: Mental status change (expressive aphasia)     Objective:   Vitals:    05/12/21 1756 05/12/21 1759 05/12/21 1802 05/12/21 1805   BP:       BP Location:       Pulse: 96 102 105 97   Resp:       Temp:       TempSrc:       SpO2: 93% 95% 93% 94%   Weight:       Height:         Physical Exam  Constitutional:       General: She is in acute distress  Appearance: She is normal weight  She is not ill-appearing or toxic-appearing  Comments: Alert, middle aged female   Appears anxious in bed   Aphasic    HENT:      Head: Normocephalic and atraumatic  Nose: Nose normal  No congestion or rhinorrhea  Comments: On 2L NC     Mouth/Throat:      Mouth: Mucous membranes are moist       Pharynx: Oropharynx is clear  No oropharyngeal exudate or posterior oropharyngeal erythema  Comments: Pursed lips   Missing left sided teeth (baseline)  Eyes:      Extraocular Movements: Extraocular movements intact  Conjunctiva/sclera: Conjunctivae normal       Pupils: Pupils are equal, round, and reactive to light  Comments: Pupils equal, round, reactive salvador   EOM intact salvador    Neck:      Musculoskeletal: Normal range of motion and neck supple  No neck rigidity or muscular tenderness  Cardiovascular:      Rate and Rhythm: Normal rate and regular rhythm  Pulses: Normal pulses  Heart sounds: No murmur  Pulmonary:      Effort: Pulmonary effort is normal  No respiratory distress  Breath sounds: Normal breath sounds  No wheezing, rhonchi or rales        Comments: No respiratory distress on NC oxygen   Abdominal:      General: Abdomen is flat  There is no distension  Palpations: Abdomen is soft  Tenderness: There is no abdominal tenderness  Comments: Incision clean, dry, intact   Musculoskeletal: Normal range of motion  General: No tenderness, deformity or signs of injury  Right lower leg: No edema  Left lower leg: No edema  Skin:     General: Skin is warm and dry  Capillary Refill: Capillary refill takes less than 2 seconds  Comments: See above    Neurological:      Mental Status: She is alert  She is disoriented  Cranial Nerves: No cranial nerve deficit  Sensory: No sensory deficit  Motor: No weakness  Comments: Keenly alert   oriented to self, not to place or time   demonstrates expressive aphasia, repetetive   Unable to identify objects  No facial droop appreciated   Strength 5/5 salvador UE and salvador LE   Sensation intact salvador   possibel ataxia vs misunderstanding of task    Psychiatric:      Comments: Anxious        Assessment:   · RRT called tonight for AMS, aphasia  See above for more details  NIH 4  Stroke alert called  Plan:   · CTH/CTA negative for acute bleed, lesion, or CVO   · Not tpa candidate   · No target lesion for IR intervention   · possibly related to some pain medications she had been receiving post-operatively   · hold all sedating meds   · Trend neuro exam closely   · Load ASA 325mg x 1   · Place on stroke pathway   · Discussed plan with primary team, Dr Eric Peraza, who will place orders and contact family   · Please reach out to CC if you have any questions or concerns      Rapid Response Outcome  Family notified of transfer: n/a  Family member contacted: will be contacted by primary team      Portions of the record may have been created with voice recognition software  Occasional wrong word or "sound a like" substitutions may have occurred due to the inherent limitations of voice recognition software    Read the chart carefully and recognize, using context, where substitutions have occurred      Arie Mills PA-C

## 2021-05-12 NOTE — PROGRESS NOTES
Progress Note - General Surgery   Giovanni Presley 61 y o  female MRN: [id number]  Unit/Bed#: [facility] 805-01 Encounter: [id number]    Assessment:  60 yo F with PMH of GERD, esophagitis, and small hiatal hernia, now s/p laparoscopic hiatal hernia repair and transoral incisionless fundoplication on 6/57    AVSS on RA    Plan:  Continue clears  Paty@[facility] until tolerating sufficient PO  PRN analgesia  PRN antiemetics  PRN anti-hypertensives  Carafate/protonix  PT/OT  DVT PPX    Subjective/Objective     Subjective: Patient states she has not yet eaten or drank since surgery, complaining of some pain in her abdomen for which she received her PRN oxy with some relief, also complaining of some nausea for which she received zofran with some relief, no vomiting    Objective:    Blood pressure 152/91, pulse 98, temperature 98 1 °F (36 7 °C), resp  rate 18, height 5' 3" (1 6 m), weight 72 6 kg (160 lb), SpO2 96 %, not currently breastfeeding  ,Body mass index is 28 34 kg/m²  Intake/Output Summary (Last 24 hours) at 5/12/2021 3835  Last data filed at 5/11/2021 2304  Gross per 24 hour   Intake 1850 ml   Output 209 ml   Net 1641 ml       Invasive Devices     Peripheral Intravenous Line            Peripheral IV 05/11/21 Left Wrist less than 1 day    Peripheral IV 05/11/21 Right Hand less than 1 day                Physical Exam:  General: AAO x 3, NAD  HEENT: Normocephalic, atraumatic, conjunctiva normal, EOMI, PERRLA  Neck: No JVD, No LAD  Cardio: RRR  Resp: NWB on RA  Abd: Soft, appropriately tender, nondistended, No guarding, no rebound, incisions c/d/i  Neuro: Sensation and motor intact x 4 limbs  Extremities: WWP, No edema  Skin: Warm and dry      Lab, Imaging and other studies:I have personally reviewed pertinent lab results      VTE Pharmacologic Prophylaxis: Heparin  VTE Mechanical Prophylaxis: sequential compression device

## 2021-05-12 NOTE — TELEPHONE ENCOUNTER
Mother calling upset to let Dr Shayy Loera know that patient is hospitalized and states she is "incoherant"  She had hernia surgery yesterday

## 2021-05-12 NOTE — QUICK NOTE
PGY4 Post-Op Check Note    S: some HTN since OR, otherwise pain well controlled  Admits to some nasuea without emesis  O:   Vitals:    05/12/21 0259   BP: 152/91   Pulse: 98   Resp: 18   Temp: 98 1 °F (36 7 °C)   SpO2: 96%       I/O last 3 completed shifts:   In: 1850 [I V :1850]  Out: 27 [Blood:30]  I/O this shift:  In: -   Out: 179 [Urine:179]    PE:  NAD, alert, obeys commands  Normocephalic, atraumatic  MMM, EOMI, PERRLA  Norm resp effort on RA  RRR  Abd soft, appropriately tender, ND, incisions cdi  No calf tenderness or peripheral edema  Motor/sensation intact in distal extremities  CN grossly intact  -rash/lesions      Lab Results   Component Value Date    WBC 6 18 04/22/2021    HGB 12 6 04/22/2021    HCT 36 5 04/22/2021    MCV 86 04/22/2021     04/22/2021     Lab Results   Component Value Date    GLUCOSE 155 (H) 01/02/2020    CALCIUM 9 8 05/11/2021     06/01/2017    K 4 0 05/11/2021    CO2 26 05/11/2021     05/11/2021    BUN 6 05/11/2021    CREATININE 0 56 (L) 05/11/2021         A/P: 61 y o  female w/moderate PEH s/p laparoscopic PEHR w transoral incisionless fundoplication 8/75   - clears  - IVF  - PRN/scheduled analgesia  - PRN anti-hypertensives  - carafate/protonix  - PT/OT  - SQH/SCDs  - dispo later today provided patient remains well      Prudence Pallas, MD

## 2021-05-12 NOTE — PLAN OF CARE
Problem: PAIN - ADULT  Goal: Verbalizes/displays adequate comfort level or baseline comfort level  Description: Interventions:  - Encourage patient to monitor pain and request assistance  - Assess pain using appropriate pain scale  - Administer analgesics based on type and severity of pain and evaluate response  - Implement non-pharmacological measures as appropriate and evaluate response  - Consider cultural and social influences on pain and pain management  - Notify physician/advanced practitioner if interventions unsuccessful or patient reports new pain  Outcome: Progressing     Problem: INFECTION - ADULT  Goal: Absence or prevention of progression during hospitalization  Description: INTERVENTIONS:  - Assess and monitor for signs and symptoms of infection  - Monitor lab/diagnostic results  - Monitor all insertion sites, i e  indwelling lines, tubes, and drains  - Monitor endotracheal if appropriate and nasal secretions for changes in amount and color  - [place] appropriate cooling/warming therapies per order  - Administer medications as ordered  - Instruct and encourage patient and family to use good hand hygiene technique  - Identify and instruct in appropriate isolation precautions for identified infection/condition  Outcome: Progressing  Goal: Absence of fever/infection during neutropenic period  Description: INTERVENTIONS:  - Monitor WBC    Outcome: Progressing     Problem: SAFETY ADULT  Goal: Patient will remain free of falls  Description: INTERVENTIONS:  - Assess patient frequently for physical needs  -  Identify cognitive and physical deficits and behaviors that affect risk of falls    -  [place] fall precautions as indicated by assessment   - Educate patient/family on patient safety including physical limitations  - Instruct patient to call for assistance with activity based on assessment  - Modify environment to reduce risk of injury  - Consider OT/PT consult to assist with strengthening/mobility  Outcome: Progressing  Goal: Maintain or return to baseline ADL function  Description: INTERVENTIONS:  -  Assess patient's ability to carry out ADLs; assess patient's baseline for ADL function and identify physical deficits which impact ability to perform ADLs (bathing, care of mouth/teeth, toileting, grooming, dressing, etc )  - Assess/evaluate cause of self-care deficits   - Assess range of motion  - Assess patient's mobility; develop plan if impaired  - Assess patient's need for assistive devices and provide as appropriate  - Encourage maximum independence but intervene and supervise when necessary  - Involve family in performance of ADLs  - Assess for home care needs following discharge   - Consider OT consult to assist with ADL evaluation and planning for discharge  - Provide patient education as appropriate  Outcome: Progressing  Goal: Maintain or return mobility status to optimal level  Description: INTERVENTIONS:  - Assess patient's baseline mobility status (ambulation, transfers, stairs, etc )    - Identify cognitive and physical deficits and behaviors that affect mobility  - Identify mobility aids required to assist with transfers and/or ambulation (gait belt, sit-to-stand, lift, walker, cane, etc )  - [place] fall precautions as indicated by assessment  - Record patient progress and toleration of activity level on Mobility SBAR; progress patient to next Phase/Stage  - Instruct patient to call for assistance with activity based on assessment  - Consider rehabilitation consult to assist with strengthening/weightbearing, etc   Outcome: Progressing     Problem: DISCHARGE PLANNING  Goal: Discharge to home or other facility with appropriate resources  Description: INTERVENTIONS:  - Identify barriers to discharge w/patient and caregiver  - Arrange for needed discharge resources and transportation as appropriate  - Identify discharge learning needs (meds, wound care, etc )  - Arrange for interpretive services to assist at discharge as needed  - Refer to Case Management Department for coordinating discharge planning if the patient needs post-hospital services based on physician/advanced practitioner order or complex needs related to functional status, cognitive ability, or social support system  Outcome: Progressing     Problem: Knowledge Deficit  Goal: Patient/family/caregiver demonstrates understanding of disease process, treatment plan, medications, and discharge instructions  Description: Complete learning assessment and assess knowledge base    Interventions:  - Provide teaching at level of understanding  - Provide teaching via preferred learning methods  Outcome: Progressing

## 2021-05-13 ENCOUNTER — APPOINTMENT (OUTPATIENT)
Dept: NON INVASIVE DIAGNOSTICS | Facility: HOSPITAL | Age: 61
DRG: 982 | End: 2021-05-13
Payer: COMMERCIAL

## 2021-05-13 ENCOUNTER — APPOINTMENT (INPATIENT)
Dept: RADIOLOGY | Facility: HOSPITAL | Age: 61
DRG: 982 | End: 2021-05-13
Payer: COMMERCIAL

## 2021-05-13 PROBLEM — R47.89 WORD FINDING DIFFICULTY: Status: ACTIVE | Noted: 2021-05-13

## 2021-05-13 LAB
ANION GAP SERPL CALCULATED.3IONS-SCNC: 4 MMOL/L (ref 4–13)
ATRIAL RATE: 97 BPM
BASOPHILS # BLD AUTO: 0.02 THOUSANDS/ΜL (ref 0–0.1)
BASOPHILS NFR BLD AUTO: 1 % (ref 0–1)
BUN SERPL-MCNC: 6 MG/DL (ref 5–25)
CALCIUM SERPL-MCNC: 8.4 MG/DL (ref 8.3–10.1)
CHLORIDE SERPL-SCNC: 109 MMOL/L (ref 100–108)
CHOLEST SERPL-MCNC: 181 MG/DL (ref 50–200)
CO2 SERPL-SCNC: 28 MMOL/L (ref 21–32)
CREAT SERPL-MCNC: 0.47 MG/DL (ref 0.6–1.3)
EOSINOPHIL # BLD AUTO: 0.1 THOUSAND/ΜL (ref 0–0.61)
EOSINOPHIL NFR BLD AUTO: 2 % (ref 0–6)
ERYTHROCYTE [DISTWIDTH] IN BLOOD BY AUTOMATED COUNT: 14.8 % (ref 11.6–15.1)
EST. AVERAGE GLUCOSE BLD GHB EST-MCNC: 97 MG/DL
GFR SERPL CREATININE-BSD FRML MDRD: 108 ML/MIN/1.73SQ M
GLUCOSE P FAST SERPL-MCNC: 91 MG/DL (ref 65–99)
GLUCOSE SERPL-MCNC: 91 MG/DL (ref 65–140)
HBA1C MFR BLD: 5 %
HCT VFR BLD AUTO: 28.9 % (ref 34.8–46.1)
HCT VFR BLD AUTO: 31.9 % (ref 34.8–46.1)
HDLC SERPL-MCNC: 69 MG/DL
HGB BLD-MCNC: 10 G/DL (ref 11.5–15.4)
HGB BLD-MCNC: 8.8 G/DL (ref 11.5–15.4)
IMM GRANULOCYTES # BLD AUTO: 0.01 THOUSAND/UL (ref 0–0.2)
IMM GRANULOCYTES NFR BLD AUTO: 0 % (ref 0–2)
LDLC SERPL CALC-MCNC: 89 MG/DL (ref 0–100)
LYMPHOCYTES # BLD AUTO: 0.85 THOUSANDS/ΜL (ref 0.6–4.47)
LYMPHOCYTES NFR BLD AUTO: 20 % (ref 14–44)
MCH RBC QN AUTO: 28.3 PG (ref 26.8–34.3)
MCHC RBC AUTO-ENTMCNC: 30.4 G/DL (ref 31.4–37.4)
MCV RBC AUTO: 93 FL (ref 82–98)
MONOCYTES # BLD AUTO: 0.38 THOUSAND/ΜL (ref 0.17–1.22)
MONOCYTES NFR BLD AUTO: 9 % (ref 4–12)
NEUTROPHILS # BLD AUTO: 2.98 THOUSANDS/ΜL (ref 1.85–7.62)
NEUTS SEG NFR BLD AUTO: 68 % (ref 43–75)
NRBC BLD AUTO-RTO: 0 /100 WBCS
P AXIS: 55 DEGREES
PLATELET # BLD AUTO: 228 THOUSANDS/UL (ref 149–390)
PMV BLD AUTO: 8.3 FL (ref 8.9–12.7)
POTASSIUM SERPL-SCNC: 3.9 MMOL/L (ref 3.5–5.3)
PR INTERVAL: 134 MS
QRS AXIS: 9 DEGREES
QRSD INTERVAL: 76 MS
QT INTERVAL: 364 MS
QTC INTERVAL: 462 MS
RBC # BLD AUTO: 3.11 MILLION/UL (ref 3.81–5.12)
SODIUM SERPL-SCNC: 141 MMOL/L (ref 136–145)
T WAVE AXIS: 75 DEGREES
TRIGL SERPL-MCNC: 113 MG/DL
TROPONIN I SERPL-MCNC: <0.02 NG/ML
VENTRICULAR RATE: 97 BPM
WBC # BLD AUTO: 4.34 THOUSAND/UL (ref 4.31–10.16)

## 2021-05-13 PROCEDURE — 85014 HEMATOCRIT: CPT | Performed by: SURGERY

## 2021-05-13 PROCEDURE — 83036 HEMOGLOBIN GLYCOSYLATED A1C: CPT | Performed by: SURGERY

## 2021-05-13 PROCEDURE — 70551 MRI BRAIN STEM W/O DYE: CPT

## 2021-05-13 PROCEDURE — 80061 LIPID PANEL: CPT | Performed by: SURGERY

## 2021-05-13 PROCEDURE — 93306 TTE W/DOPPLER COMPLETE: CPT | Performed by: INTERNAL MEDICINE

## 2021-05-13 PROCEDURE — 99024 POSTOP FOLLOW-UP VISIT: CPT | Performed by: SURGERY

## 2021-05-13 PROCEDURE — 97167 OT EVAL HIGH COMPLEX 60 MIN: CPT

## 2021-05-13 PROCEDURE — 99223 1ST HOSP IP/OBS HIGH 75: CPT | Performed by: PSYCHIATRY & NEUROLOGY

## 2021-05-13 PROCEDURE — 85018 HEMOGLOBIN: CPT | Performed by: SURGERY

## 2021-05-13 PROCEDURE — G1004 CDSM NDSC: HCPCS

## 2021-05-13 PROCEDURE — 80048 BASIC METABOLIC PNL TOTAL CA: CPT | Performed by: NURSE PRACTITIONER

## 2021-05-13 PROCEDURE — 97162 PT EVAL MOD COMPLEX 30 MIN: CPT

## 2021-05-13 PROCEDURE — 93010 ELECTROCARDIOGRAM REPORT: CPT | Performed by: INTERNAL MEDICINE

## 2021-05-13 PROCEDURE — 93306 TTE W/DOPPLER COMPLETE: CPT

## 2021-05-13 PROCEDURE — 99222 1ST HOSP IP/OBS MODERATE 55: CPT | Performed by: NURSE PRACTITIONER

## 2021-05-13 PROCEDURE — 85025 COMPLETE CBC W/AUTO DIFF WBC: CPT | Performed by: NURSE PRACTITIONER

## 2021-05-13 RX ADMIN — PAROXETINE HYDROCHLORIDE 60 MG: 20 TABLET, FILM COATED ORAL at 09:42

## 2021-05-13 RX ADMIN — PANTOPRAZOLE SODIUM 40 MG: 40 TABLET, DELAYED RELEASE ORAL at 05:34

## 2021-05-13 RX ADMIN — ACETAMINOPHEN 975 MG: 325 TABLET, FILM COATED ORAL at 14:01

## 2021-05-13 RX ADMIN — LEVOTHYROXINE SODIUM 25 MCG: 25 TABLET ORAL at 09:42

## 2021-05-13 RX ADMIN — SUCRALFATE 1 G: 1 TABLET ORAL at 11:44

## 2021-05-13 RX ADMIN — SUCRALFATE 1 G: 1 TABLET ORAL at 21:09

## 2021-05-13 RX ADMIN — SUCRALFATE 1 G: 1 TABLET ORAL at 05:34

## 2021-05-13 RX ADMIN — HEPARIN SODIUM 5000 UNITS: 5000 INJECTION INTRAVENOUS; SUBCUTANEOUS at 05:34

## 2021-05-13 RX ADMIN — ATORVASTATIN CALCIUM 40 MG: 40 TABLET, FILM COATED ORAL at 18:31

## 2021-05-13 RX ADMIN — ACETAMINOPHEN 975 MG: 325 TABLET, FILM COATED ORAL at 05:33

## 2021-05-13 RX ADMIN — ASPIRIN 81 MG: 81 TABLET, COATED ORAL at 09:42

## 2021-05-13 RX ADMIN — HEPARIN SODIUM 5000 UNITS: 5000 INJECTION INTRAVENOUS; SUBCUTANEOUS at 14:02

## 2021-05-13 RX ADMIN — ACETAMINOPHEN 975 MG: 325 TABLET, FILM COATED ORAL at 21:07

## 2021-05-13 RX ADMIN — QUETIAPINE FUMARATE 400 MG: 300 TABLET ORAL at 21:07

## 2021-05-13 RX ADMIN — SUCRALFATE 1 G: 1 TABLET ORAL at 16:33

## 2021-05-13 RX ADMIN — HEPARIN SODIUM 5000 UNITS: 5000 INJECTION INTRAVENOUS; SUBCUTANEOUS at 21:09

## 2021-05-13 RX ADMIN — PANTOPRAZOLE SODIUM 40 MG: 40 TABLET, DELAYED RELEASE ORAL at 16:33

## 2021-05-13 NOTE — PROGRESS NOTES
Progress Note - General Surgery   Omar Jennifer 61 y o  female MRN: [id number]  Unit/Bed#: [facility] 805-01 Encounter: [id number]    Assessment:  62 yo F with PMH of GERD, esophagitis, and small hiatal hernia, now s/p laparoscopic hiatal hernia repair and transoral incisionless fundoplication on 2/50    AVSS on RA    Plan:  Complete stroke workup, MRI pending  Continue clears, disposition planning  PRN analgesia, avoid robaxin  PRN antiemetics  PRN anti-hypertensives  Carafate/protonix  PT/OT  DVT PPX    Subjective/Objective     Subjective: Stroke alert called overnight due to concern for altered mental status  Mental status improved this am to normal, likely related to robaxin administration    Objective:    Blood pressure 124/72, pulse 84, temperature 97 9 °F (36 6 °C), resp  rate 16, height 5' 3" (1 6 m), weight 72 6 kg (160 lb), SpO2 93 %, not currently breastfeeding  ,Body mass index is 28 34 kg/m²  Intake/Output Summary (Last 24 hours) at 5/13/2021 0752  Last data filed at 5/13/2021 0446  Gross per 24 hour   Intake 2475 ml   Output 4485 ml   Net -2010 ml       Invasive Devices     Peripheral Intravenous Line            Peripheral IV 05/11/21 Left Wrist 1 day    Peripheral IV 05/11/21 Right Hand 1 day          Drain            External Urinary Catheter less than 1 day                Physical Exam:   GENERAL APPEARANCE: in no acute distress  NEURO: stable  HEENT: normocephalic, atraumatic  CV: regular rate and rhythm, no tachycardia,   LUNGS: clear to auscultation bilaterally  GI: soft, nontender, nondistended  Incisions clean  : no abnormality detected  MSK: no abnormality detected   SKIN: no abnormality detected      Lab, Imaging and other studies:I have personally reviewed pertinent lab results      VTE Pharmacologic Prophylaxis: Heparin  VTE Mechanical Prophylaxis: sequential compression device

## 2021-05-13 NOTE — QUICK NOTE
I saw the patient due to concern for change in mental status  She was able to speak but had slow and slurred speech with word finding difficulty  She had deviation of her tongue to the left side  No other focal neurological deficits and equal sensory and motor function b/l with intact cranial nerves except for tongue deviation  Per nursing, the deficit was at least partially present the preceding night  Per her mother, she is normal at baseline  In view of concern for change in mental status from baseline, a stroke alert was called  Further workup and management will be as per stroke protocol

## 2021-05-13 NOTE — PLAN OF CARE
Problem: OCCUPATIONAL THERAPY ADULT  Goal: Performs self-care activities at highest level of function for planned discharge setting  See evaluation for individualized goals  Description: Treatment Interventions: ADL retraining, Functional transfer training, Continued evaluation, Patient/family training, Cognitive reorientation, Compensatory technique education  Equipment Recommended: (no DME needs)       See flowsheet documentation for full assessment, interventions and recommendations  Note: Limitation: Decreased ADL status, Decreased cognition, Decreased self-care trans, Decreased high-level ADLs  Prognosis: Good  Assessment: Pt is a 61 y o  female who was admitted to [facility] on 5/11/2021 with Gastroesophageal reflux disease with esophagitis without hemorrhage laparoscopic paraesophageal hernia repair on 05/11  Pt's problem list also includes PMH of  has a past medical history of Anxiety, Arthritis, Back pain at L4-L5 level, Schreiber esophagus, Bulimia, Colon polyp, Disease of thyroid gland, Ear problems, GERD (gastroesophageal reflux disease), History of transfusion, Hyperlipidemia, Hypothyroidism, Leukopenia, Nasal congestion, NMS (neuroleptic malignant syndrome) (01/03/2020), Pneumonia, Rheumatoid arthritis involving right hip ([facility] [address] ), Sciatica, Seizure ([facility] [address] ), Seizures ([facility] [address] ), Synovial cyst of lumbar spine, Vertigo, and Weight gain    At baseline pt was completing I with ADL;'s, shares homemaking tasks with mother, +drives, no AD with functional ambulation    Pt lives with mother in a [facility] with 1STE  Currently pt requires S for overall ADLS and S without AD for functional mobility/transfers  Pt currently presents with impairments in the following categories -steps to enter environment, difficulty performing ADLS and difficulty performing IADLS  memory, communication and interpersonal skills   These impairments, as well as pt's risk for falls  limit pt's ability to safely engage in all baseline areas of occupation, includinglaundry , driving, house maintenance, medication management, meal prep, social participation  and leisure activities  The patient's raw score on the AM-PAC Daily Activity inpatient short form is 24, standardized score is 57 54, greater than 39 4  Patients at this level are likely to benefit from discharge to home with outpatient OT pending cognitive assessment    Please refer to the recommendation of the Occupational Therapist for safe discharge planning  From OT standpoint, recommend home with increased family support, continue to assess cognition for driving safety upon D/C  OT will continue to follow to address the below stated goals        OT Discharge Recommendation: Home with home health rehabilitation(home health vs oupt OT pending cognitive evaluation)  OT - OK to Discharge: No

## 2021-05-13 NOTE — CONSULTS
NEUROLOGY RESIDENCY CONSULT NOTE     Name: J Luis Nelson   Age & Sex: 61 y o  female   MRN: [id number]  Unit/Bed#: [facility] 805-01   Encounter: [id number]  Length of Stay: 1    ASSESSMENT & PLAN     Word finding difficulty  Assessment & Plan  61 y o  female w h/o PEH (paraesophageal hernia) s/p laparoscopic paraesophageal hernia w transoral incisionless fundoplication on 0/90, GERD, HTN, Iron anemia, bulimia nervosa, schizoaffective disorder who presented in [facility] ED on 05/11 for laparoscopic paraesophageal hernia repair  Neurology is consulted due to patient feeling confused/difficulty getting the words out/word-finding difficulty, Rapid response at 5:45 pm, Stroke alert was called at 6:01 p m  On 51/54 LKW-uncertain, patient was baseline all day than mother alerted staff that she was not right, possible aphasia,altered, word-finding difficulty without droop or hemiparesis, NIHSS 4, CT head no acute changes, CTA no LVO, tPA not given due to recent surgery/unknown LKW, she was given aspirin 325 mg  Etiology- low suspicion for stroke at this time, likely related to sedative meds/toxic metabolic encephalopathy vs MCI   (probably need neuropsych testing as outpatient)  Will check MRI to rule out any acute abnormality  Plan-   MRI brain pending  Continue aspirin 81 mg, statin 40 mg for now if MRI is negative for any new stroke then we can discontinue it  Patient not on any antiplatelet or statin at home   Patient would benefit neuropsych testing as outpatient if no improvement in her word-finding difficulty at time of discharge  Neurology will follow      Recommendations for outpatient neurological follow up have yet to be determined       SUBJECTIVE     Reason for Consult / Principal Problem:  Stroke alert  Hx and PE limited by:  Nothing    HPI: J Luis Nelson is a 61 y o  female w h/o PEH (paraesophageal hernia) s/p laparoscopic paraesophageal hernia w transoral incisionless fundoplication, GERD, HTN, Iron anemia, bulimia nervosa, schizoaffective disorder who presented in \[facility]"" ED on 05/11 for laparoscopic paraesophageal hernia repair  Neurology is consulted due to aphasia  Rapid response at 5:45 pm, Stroke alert was called at 6:01 p m  On 72/11 LKW-uncertain, patient was baseline all day than mother alerted staff that she was not right, possible aphasia,altered, word-finding difficulty without droop or hemiparesis, NIHSS 4, CT head no acute changes, CTA no LVO, tPA not given due to recent surgery/unknown LKW, she was given aspirin 325 mg  Patient had her laparoscopic paraesophageal hernia repair on 05/11, she says she started feeling confused/difficulty getting the words out/word-finding difficulty after the surgery then when she was talking to her mother on 05/12 it was noted by her mother as well and then rapid response was called  She denies any focal weakness, numbness, slurred speech, any other neurological issue  Patient received 2 doses Robaxin, 2 doses of oxycodone       During my evaluation today,           Inpatient consult to Neurology     Performed by  Estelle Kong MD     Authorized by Matt Saleem MD              Inpatient consult to Neurology     Performed by  Estelle Kong MD     Authorized by Matt Saleem MD              Inpatient consult to Neurology     Performed by  Estelle Kong MD     Authorized by Jasvir Xiong PA-C              Historical Information   Past Medical History:   Diagnosis Date    Anxiety     Arthritis     Back pain at L4-L5 level     Schreiber esophagus     Bulimia     Colon polyp     Disease of thyroid gland     hypothyroid    Ear problems     GERD (gastroesophageal reflux disease)     History of transfusion     Hyperlipidemia     Hypothyroidism     Leukopenia     Nasal congestion     NMS (neuroleptic malignant syndrome) 01/03/2020    Pneumonia     [facility]umatoid arthritis involving right hip ([facility] [address]     Sciatica     Seizure ([facility] [address] )     due to medication mix up 8/2018 only one historically    Seizures ([facility] [address] [facility]     Synovial cyst of lumbar spine     Vertigo     Weight gain      Past Surgical History:   Procedure Laterality Date    BONE MARROW BIOPSY      BREAST SURGERY      enlargement     CLOSED REDUCTION DISTAL FEMUR FRACTURE      COLONOSCOPY      COSMETIC SURGERY      HIP ARTHROPLASTY Right 1/2/2020    Procedure: REVISION TOTAL HIP ARTHROPLASTY WITH REPAIR OF PARIPROSTHETIC FRACTURE - POSTERIOR APPROACH;  Surgeon: Smith Martinez MD;  [facility];  Service: Orthopedics    OR ESOPHAGOGASTRODUODENOSCOPY TRANSORAL DIAGNOSTIC N/A 5/11/2021    Procedure: ESOPHAGOGASTRODUODENOSCOPY (EGD); Surgeon: Myah Ivy MD;  [facility];  Service: General    OR ESOPHAGUS SURG PROCEDURE UNLISTED N/A 5/11/2021    Procedure: FUNDOPLICATION TRANSORAL INCISIONLESS;  Surgeon: Kristel Syed MD;  [facility];  Service: Gastroenterology    OR LAP, REPAIR PARAESOPHAGEAL HERNIA, INCL FUNDOPLASTY W/ MESH N/A 5/11/2021    Procedure: REPAIR HERNIA PARAESOPHAGEAL  LAPAROSCOPIC;  Surgeon:  Myah Ivy MD;  [facility];  Service: General    OR TOTAL HIP ARTHROPLASTY Right 12/11/2019    Procedure: ARTHROPLASTY HIP TOTAL ANTERIOR;  Surgeon: Smith Martinez MD;  [facility];  Service: Orthopedics    RHINOPLASTY      SINUS SURGERY       Social History   Social History     Substance and Sexual Activity   Alcohol Use Never    Frequency: Never    Binge frequency: Never     Social History     Substance and Sexual Activity   Drug Use No     E-Cigarette/Vaping    E-Cigarette Use Never User      E-Cigarette/Vaping Substances    Nicotine No     THC No     CBD No     Flavoring No     Other No     Unknown No      Social History     Tobacco Use   Smoking Status Never Smoker   Smokeless Tobacco Never Used     Family History:   Family History   Problem Relation Age of Onset    Uterine cancer Mother    [facility] Esophageal cancer Father     Colon cancer Maternal Grandmother      Meds/Allergies   all current active meds have been reviewed, current meds:   Current Facility-Administered Medications   Medication Dose Route Frequency    acetaminophen (TYLENOL) tablet 975 mg  975 mg Oral Q8H [address]    aspirin (ECOTRIN LOW STRENGTH) EC tablet 81 mg  81 mg Oral Daily    atorvastatin (LIPITOR) tablet 40 mg  40 mg Oral QPM    heparin (porcine) subcutaneous injection 5,000 Units  5,000 Units Subcutaneous Q8H [address]    hydrALAZINE (APRESOLINE) injection 10 mg  10 mg Intravenous Q6H PRN    HYDROmorphone (DILAUDID) injection 0 5 mg  0 5 mg Intravenous Q3H PRN    levothyroxine tablet 25 mcg  25 mcg Oral Daily    oxyCODONE (ROXICODONE) IR tablet 10 mg  10 mg Oral Q4H PRN    oxyCODONE (ROXICODONE) IR tablet 5 mg  5 mg Oral Q4H PRN    pantoprazole (PROTONIX) EC tablet 40 mg  40 mg Oral BID AC    PARoxetine (PAXIL) tablet 60 mg  60 mg Oral Daily    promethazine (PHENERGAN) injection 12 5 mg  12 5 mg Intravenous Q6H PRN    QUEtiapine (SEROquel) tablet 400 mg  400 mg Oral HS    sucralfate (CARAFATE) tablet 1 g  1 g Oral 4x Daily (AC & HS)     Facility-Administered Medications Ordered in Other Encounters   Medication Dose Route Frequency    mineral oil liquid 15 mL  15 mL Oral Once    and PTA meds:   Prior to Admission Medications   Prescriptions Last Dose Informant Patient Reported? Taking?    LORazepam (ATIVAN) 2 mg tablet 5/11/2021 at 0600  No Yes   Sig: Take 1 tablet (2 mg total) by mouth every 6 (six) hours as needed for anxiety   Multiple Vitamin (MULTIVITAMIN) capsule 5/10/2021 at Unknown time Self No Yes   Sig: Take 1 capsule by mouth daily   PARoxetine (PAXIL) 30 mg tablet 5/11/2021 at 0600 Self No Yes   Sig: Take 2 tablets (60 mg total) by mouth daily   QUEtiapine (SEROquel) 400 MG tablet 5/10/2021 at Unknown time Self No Yes   Sig: TAKE 1 TABLET (400 MG TOTAL) BY MOUTH DAILY AT BEDTIME   famotidine (PEPCID) 40 MG tablet 5/10/2021 at Unknown time  No Yes   Sig: Take 1 tablet (40 mg total) by mouth daily at bedtime   ferrous sulfate 325 (65 Fe) mg tablet 5/10/2021 at Unknown time Self No Yes   Sig: Take 1 tablet (325 mg total) by mouth 2 (two) times a day with meals   levothyroxine 25 mcg tablet 5/10/2021 at Unknown time Self No Yes   Sig: Take 1 tablet (25 mcg total) by mouth daily   meclizine (ANTIVERT) 25 mg tablet Unknown at Unknown time Self No No   Sig: Take 1 tablet (25 mg total) by mouth 3 (three) times a day as needed for dizziness   omeprazole (PriLOSEC) 40 MG capsule  Self No Yes   Sig: Take 1 capsule (40 mg total) by mouth 2 (two) times a day   ondansetron (ZOFRAN-ODT) 4 mg disintegrating tablet Past Week at Unknown time  No Yes   Sig: Take 1 tablet (4 mg total) by mouth every 6 (six) hours as needed for nausea or vomiting   sucralfate (CARAFATE) 1 g/10 mL suspension  Self No Yes   Sig: Take 10 mL (1 g total) by mouth 4 (four) times a day   triamcinolone (KENALOG) 0 1 % cream Unknown at Unknown time  No No   Sig: Apply 1 application topically 2 (two) times a day To affected area   ziprasidone (GEODON) 80 mg capsule 5/11/2021 at 0600 Self No Yes   Sig: TAKE 1 CAPSULE BY MOUTH EVERY DAY      Facility-Administered Medications: None     Allergies   Allergen Reactions    Risperdal [Risperidone] Shortness Of Breath     Rapid heart beat, SOB    Zyprexa [Olanzapine] Shortness Of Breath     Rapid heartbeat    Latex Rash     Band aids, adhesives wears normal underwear, can eat bananas  USE PAPER TAPE       Review of previous medical records was  completed  Review of Systems   Constitutional: Negative for chills and fever  HENT: Negative for ear pain and sore throat  Eyes: Negative for pain and visual disturbance  Respiratory: Negative for cough and shortness of breath  Cardiovascular: Negative for chest pain and palpitations  Gastrointestinal: Negative for abdominal pain and vomiting     Musculoskeletal: Negative for arthralgias and back pain  Skin: Negative for color change and rash  All other systems reviewed and are negative  OBJECTIVE     Patient ID: Praneeth Munroe is a 61 y o  female  Vitals:   Vitals:    21 0207 21 0314 21 0538 21 0742   BP: 99/56 99/65 133/82 124/72   Pulse: 73 77 91 84   Resp: 18 16 18 16   Temp: 97 8 °F (36 6 °C) 97 9 °F (36 6 °C)  97 9 °F (36 6 °C)   TempSrc:       SpO2: 96% 98% 96% 93%   Weight:       Height:          Body mass index is 28 34 kg/m²  Intake/Output Summary (Last 24 hours) at 2021 1156  Last data filed at 2021 0800  Gross per 24 hour   Intake 2875 ml   Output 2851 ml   Net 24 ml       Temperature:   Temp (24hrs), Av 2 °F (36 8 °C), Min:97 7 °F (36 5 °C), Max:98 6 °F (37 °C)    Temperature: 97 9 °F (36 6 °C)    Invasive Devices: Invasive Devices     Peripheral Intravenous Line            Peripheral IV 21 Left Wrist 2 days    Peripheral IV 21 Right Hand 1 day          Drain            External Urinary Catheter 1 day                Physical Exam  Vitals signs and nursing note reviewed  Constitutional:       General: She is not in acute distress  Appearance: She is well-developed  HENT:      Head: Normocephalic and atraumatic  Eyes:      Conjunctiva/sclera: Conjunctivae normal    Neck:      Musculoskeletal: Neck supple  Cardiovascular:      Rate and Rhythm: Normal rate and regular rhythm  Heart sounds: No murmur  Pulmonary:      Effort: Pulmonary effort is normal  No respiratory distress  Breath sounds: Normal breath sounds  Abdominal:      Palpations: Abdomen is soft  Tenderness: There is no abdominal tenderness  Skin:     General: Skin is warm and dry  Neurological:      Mental Status: She is alert  Neurological Examination:     Mental Status: The patient was awake, alert, attentive, oriented to person, place    When asked about the date patient thinks for long time, unable to come up with the month or the year  Patient able to name all the objects on the stroke card correctly, patient fluent while describing the picture on the stroke card,  repetition is intact, naming intact, able to tell me she had Jello, lemon in breakfast, difficulty doing the maths, 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  Cranial Nerves:   I: smell Not tested   II: visual fields Full to confrontation  Pupils equal, round, reactive to light with normal accomodation  Fundus: benign fundus  III,IV,VI: extraocular muscles EOMI with jerky movement noted horizontally  V: masseter and pterygoid strength full  Sensation in the V1 through V3 distributions intact to pinprick and light touch bilaterally  VII: Face is symmetric with no weakness noted  VIII: Audition intact to finger rub bilaterally  IX/X: Uvula midline  Soft palate elevation symmetric  XI: Trapezius and SCM strength 5/5 B/L  XII: Tongue midline with no atrophy or fasciculations with appropriate movement  Motor Examination:   No pronator drift  Bulk: Normal  No atrophy Tone: Normal  Fasciculations: None  Deltoid Biceps Triceps WE   WF   FF IO     Right        5         5          5         5      5      5   5        Left           5        5          5          5      5     5   5                       IP        Quad   Ham     TA       Gastroc   Right      5            5          5         5                5  Left         5            5         5         5                5       Reflexes:                   Biceps Brachioradialis Triceps Patella Achilles Plantars   Right          2+            2+                  2+        1+       2+         Down   Left            2+             2+                 2+         2+       2+         Down     Clonus: None    Coordination: Patient able to perform normal finger-to-nose normally       Sensory: Normal sensation to light touch, pin prick and vibratory sensation throughout  Gait:  Deferred    LABORATORY DATA     Labs: I have personally reviewed pertinent reports  Results from last 7 days   Lab Units 05/13/21  0956 05/13/21  0500 05/12/21  1846 05/12/21  0459   WBC Thousand/uL  --  4 34  --  7 90   HEMOGLOBIN g/dL 10 0* 8 8*  --  11 7   HEMATOCRIT % 31 9* 28 9*  --  37 2   PLATELETS Thousands/uL  --  228 286 345   NEUTROS PCT %  --  68  --  87*   MONOS PCT %  --  9  --  7      Results from last 7 days   Lab Units 05/13/21  0501 05/12/21  1847 05/12/21  0459   POTASSIUM mmol/L 3 9 3 5 3 8   CHLORIDE mmol/L 109* 103 102   CO2 mmol/L 28 27 25   BUN mg/dL 6 5 5   CREATININE mg/dL 0 47* 0 54* 0 46*   CALCIUM mg/dL 8 4 8 2* 8 6              Results from last 7 days   Lab Units 05/11/21  0925   INR  0 86   PTT seconds 27         Results from last 7 days   Lab Units 05/12/21  2354 05/12/21 2056 05/12/21 1846   TROPONIN I ng/mL <0 02 <0 02 <0 02       IMAGING & DIAGNOSTIC TESTING     Radiology Results: I have personally reviewed pertinent reports  CT stroke alert brain   Final Result by Olayinka Choi MD (05/12 1836)      No acute intracranial abnormality  Microangiopathic changes  Findings were directly discussed with Sea Cristina on 5/12/2021 6:33 PM       Workstation performed: [id number]         CTA stroke alert (head/neck)   Final Result by Olayinka Choi MD (05/12 1859)      No evidence of hemodynamic significant stenosis, aneurysm or dissection  Soft tissue air is identified within the deep right portion of the neck around the vasculature  This is of indeterminate etiology, correlate clinically  Consolidation in the left and right upper lobes and lower lobes is partially visualized could relate to multifocal pneumonia and/or atelectasis  Small amount of anterior left pneumomediastinum of indeterminate origin                  Findings were directly discussed with Alex Moran on 5/12/2021 6:36 PM  Workstation performed: [id number]         MRI brain wo contrast    (Results Pending)       Other Diagnostic Testing: I have personally reviewed pertinent reports  ACTIVE MEDICATIONS     Current Facility-Administered Medications   Medication Dose Route Frequency    acetaminophen (TYLENOL) tablet 975 mg  975 mg Oral Q8H [facility]    aspirin (ECOTRIN LOW STRENGTH) EC tablet 81 mg  81 mg Oral Daily    atorvastatin (LIPITOR) tablet 40 mg  40 mg Oral QPM    heparin (porcine) subcutaneous injection 5,000 Units  5,000 Units Subcutaneous Q8H [facility]    hydrALAZINE (APRESOLINE) injection 10 mg  10 mg Intravenous Q6H PRN    HYDROmorphone (DILAUDID) injection 0 5 mg  0 5 mg Intravenous Q3H PRN    levothyroxine tablet 25 mcg  25 mcg Oral Daily    oxyCODONE (ROXICODONE) IR tablet 10 mg  10 mg Oral Q4H PRN    oxyCODONE (ROXICODONE) IR tablet 5 mg  5 mg Oral Q4H PRN    pantoprazole (PROTONIX) EC tablet 40 mg  40 mg Oral BID AC    PARoxetine (PAXIL) tablet 60 mg  60 mg Oral Daily    promethazine (PHENERGAN) injection 12 5 mg  12 5 mg Intravenous Q6H PRN    QUEtiapine (SEROquel) tablet 400 mg  400 mg Oral HS    sucralfate (CARAFATE) tablet 1 g  1 g Oral 4x Daily (AC & HS)     Facility-Administered Medications Ordered in Other Encounters   Medication Dose Route Frequency    mineral oil liquid 15 mL  15 mL Oral Once       Prior to Admission medications    Medication Sig Start Date End Date Taking?  Authorizing Provider   famotidine (PEPCID) 40 MG tablet Take 1 tablet (40 mg total) by mouth daily at bedtime 3/19/21  Yes ABENA Henry   ferrous sulfate 325 (65 Fe) mg tablet Take 1 tablet (325 mg total) by mouth 2 (two) times a day with meals 11/23/20  Yes Meaghan Cross PA-C   levothyroxine 25 mcg tablet Take 1 tablet (25 mcg total) by mouth daily 4/29/20  Yes Oscar Gustafson MD   LORazepam (ATIVAN) 2 mg tablet Take 1 tablet (2 mg total) by mouth every 6 (six) hours as needed for anxiety 4/16/21  Yes Oscar Gustafson MD   Multiple Vitamin (MULTIVITAMIN) capsule Take 1 capsule by mouth daily 11/14/19  Yes Sabine Aguilar PA-C   omeprazole (PriLOSEC) 40 MG capsule Take 1 capsule (40 mg total) by mouth 2 (two) times a day 1/8/21 5/6/21 Yes ABENA Pinzon   ondansetron (ZOFRAN-ODT) 4 mg disintegrating tablet Take 1 tablet (4 mg total) by mouth every 6 (six) hours as needed for nausea or vomiting 4/26/21  Yes Lashay Watkins MD   PARoxetine (PAXIL) 30 mg tablet Take 2 tablets (60 mg total) by mouth daily 12/18/20  Yes Lashay Watkins MD   QUEtiapine (SEROquel) 400 MG tablet TAKE 1 TABLET (400 MG TOTAL) BY MOUTH DAILY AT BEDTIME 2/11/21  Yes Lashay Watkins MD   sucralfate (CARAFATE) 1 g/10 mL suspension Take 10 mL (1 g total) by mouth 4 (four) times a day 2/24/21 5/6/21 Yes Gabriele Joshi MD   ziprasidone (GEODON) 80 mg capsule TAKE 1 CAPSULE BY MOUTH EVERY DAY 3/3/21  Yes Lashay Watkins MD   meclizine (ANTIVERT) 25 mg tablet Take 1 tablet (25 mg total) by mouth 3 (three) times a day as needed for dizziness 3/30/20   Lashay Waktins MD   triamcinolone (KENALOG) 0 1 % cream Apply 1 application topically 2 (two) times a day To affected area 4/12/21   Lashay Watkins MD         CODE STATUS & ADVANCED DIRECTIVES     Code Status: Level 1 - Full Code  Advance Directive and Living Will:      Power of :    POLST:        VTE Pharmacologic Prophylaxis: Enoxaparin (Lovenox)  VTE Mechanical Prophylaxis: sequential compression device    ==  MD Sunni Padilla's Neurology Residency, PGY-2

## 2021-05-13 NOTE — PLAN OF CARE
Problem: PAIN - ADULT  Goal: Verbalizes/displays adequate comfort level or baseline comfort level  Description: Interventions:  - Encourage patient to monitor pain and request assistance  - Assess pain using appropriate pain scale  - Administer analgesics based on type and severity of pain and evaluate response  - Implement non-pharmacological measures as appropriate and evaluate response  - Consider cultural and social influences on pain and pain management  - Notify physician/advanced practitioner if interventions unsuccessful or patient reports new pain  Outcome: Progressing     Problem: INFECTION - ADULT  Goal: Absence or prevention of progression during hospitalization  Description: INTERVENTIONS:  - Assess and monitor for signs and symptoms of infection  - Monitor lab/diagnostic results  - Monitor all insertion sites, i e  indwelling lines, tubes, and drains  - Monitor endotracheal if appropriate and nasal secretions for changes in amount and color  - [place] appropriate cooling/warming therapies per order  - Administer medications as ordered  - Instruct and encourage patient and family to use good hand hygiene technique  - Identify and instruct in appropriate isolation precautions for identified infection/condition  Outcome: Progressing  Goal: Absence of fever/infection during neutropenic period  Description: INTERVENTIONS:  - Monitor WBC    Outcome: Progressing     Problem: SAFETY ADULT  Goal: Patient will remain free of falls  Description: INTERVENTIONS:  - Assess patient frequently for physical needs  -  Identify cognitive and physical deficits and behaviors that affect risk of falls    -  [place] fall precautions as indicated by assessment   - Educate patient/family on patient safety including physical limitations  - Instruct patient to call for assistance with activity based on assessment  - Modify environment to reduce risk of injury  - Consider OT/PT consult to assist with strengthening/mobility  Outcome: Progressing  Goal: Maintain or return to baseline ADL function  Description: INTERVENTIONS:  -  Assess patient's ability to carry out ADLs; assess patient's baseline for ADL function and identify physical deficits which impact ability to perform ADLs (bathing, care of mouth/teeth, toileting, grooming, dressing, etc )  - Assess/evaluate cause of self-care deficits   - Assess range of motion  - Assess patient's mobility; develop plan if impaired  - Assess patient's need for assistive devices and provide as appropriate  - Encourage maximum independence but intervene and supervise when necessary  - Involve family in performance of ADLs  - Assess for home care needs following discharge   - Consider OT consult to assist with ADL evaluation and planning for discharge  - Provide patient education as appropriate  Outcome: Progressing  Goal: Maintain or return mobility status to optimal level  Description: INTERVENTIONS:  - Assess patient's baseline mobility status (ambulation, transfers, stairs, etc )    - Identify cognitive and physical deficits and behaviors that affect mobility  - Identify mobility aids required to assist with transfers and/or ambulation (gait belt, sit-to-stand, lift, walker, cane, etc )  - [place] fall precautions as indicated by assessment  - Record patient progress and toleration of activity level on Mobility SBAR; progress patient to next Phase/Stage  - Instruct patient to call for assistance with activity based on assessment  - Consider rehabilitation consult to assist with strengthening/weightbearing, etc   Outcome: Progressing     Problem: DISCHARGE PLANNING  Goal: Discharge to home or other facility with appropriate resources  Description: INTERVENTIONS:  - Identify barriers to discharge w/patient and caregiver  - Arrange for needed discharge resources and transportation as appropriate  - Identify discharge learning needs (meds, wound care, etc )  - Arrange for interpretive services to assist at discharge as needed  - Refer to Case Management Department for coordinating discharge planning if the patient needs post-hospital services based on physician/advanced practitioner order or complex needs related to functional status, cognitive ability, or social support system  Outcome: Progressing     Problem: Knowledge Deficit  Goal: Patient/family/caregiver demonstrates understanding of disease process, treatment plan, medications, and discharge instructions  Description: Complete learning assessment and assess knowledge base  Interventions:  - Provide teaching at level of understanding  - Provide teaching via preferred learning methods  Outcome: Progressing     Problem: Potential for Falls  Goal: Patient will remain free of falls  Description: INTERVENTIONS:  - Assess patient frequently for physical needs  -  Identify cognitive and physical deficits and behaviors that affect risk of falls    -  [place] fall precautions as indicated by assessment   - Educate patient/family on patient safety including physical limitations  - Instruct patient to call for assistance with activity based on assessment  - Modify environment to reduce risk of injury  - Consider OT/PT consult to assist with strengthening/mobility  Outcome: Progressing

## 2021-05-13 NOTE — PLAN OF CARE
Problem: PAIN - ADULT  Goal: Verbalizes/displays adequate comfort level or baseline comfort level  Description: Interventions:  - Encourage patient to monitor pain and request assistance  - Assess pain using appropriate pain scale  - Administer analgesics based on type and severity of pain and evaluate response  - Implement non-pharmacological measures as appropriate and evaluate response  - Consider cultural and social influences on pain and pain management  - Notify physician/advanced practitioner if interventions unsuccessful or patient reports new pain  Outcome: Progressing     Problem: INFECTION - ADULT  Goal: Absence or prevention of progression during hospitalization  Description: INTERVENTIONS:  - Assess and monitor for signs and symptoms of infection  - Monitor lab/diagnostic results  - Monitor all insertion sites, i e  indwelling lines, tubes, and drains  - Monitor endotracheal if appropriate and nasal secretions for changes in amount and color  - [place] appropriate cooling/warming therapies per order  - Administer medications as ordered  - Instruct and encourage patient and family to use good hand hygiene technique  - Identify and instruct in appropriate isolation precautions for identified infection/condition  Outcome: Progressing  Goal: Absence of fever/infection during neutropenic period  Description: INTERVENTIONS:  - Monitor WBC    Outcome: Progressing     Problem: SAFETY ADULT  Goal: Patient will remain free of falls  Description: INTERVENTIONS:  - Assess patient frequently for physical needs  -  Identify cognitive and physical deficits and behaviors that affect risk of falls    -  [place] fall precautions as indicated by assessment   - Educate patient/family on patient safety including physical limitations  - Instruct patient to call for assistance with activity based on assessment  - Modify environment to reduce risk of injury  - Consider OT/PT consult to assist with strengthening/mobility  Outcome: Progressing  Goal: Maintain or return to baseline ADL function  Description: INTERVENTIONS:  -  Assess patient's ability to carry out ADLs; assess patient's baseline for ADL function and identify physical deficits which impact ability to perform ADLs (bathing, care of mouth/teeth, toileting, grooming, dressing, etc )  - Assess/evaluate cause of self-care deficits   - Assess range of motion  - Assess patient's mobility; develop plan if impaired  - Assess patient's need for assistive devices and provide as appropriate  - Encourage maximum independence but intervene and supervise when necessary  - Involve family in performance of ADLs  - Assess for home care needs following discharge   - Consider OT consult to assist with ADL evaluation and planning for discharge  - Provide patient education as appropriate  Outcome: Progressing  Goal: Maintain or return mobility status to optimal level  Description: INTERVENTIONS:  - Assess patient's baseline mobility status (ambulation, transfers, stairs, etc )    - Identify cognitive and physical deficits and behaviors that affect mobility  - Identify mobility aids required to assist with transfers and/or ambulation (gait belt, sit-to-stand, lift, walker, cane, etc )  - [place] fall precautions as indicated by assessment  - Record patient progress and toleration of activity level on Mobility SBAR; progress patient to next Phase/Stage  - Instruct patient to call for assistance with activity based on assessment  - Consider rehabilitation consult to assist with strengthening/weightbearing, etc   Outcome: Progressing     Problem: DISCHARGE PLANNING  Goal: Discharge to home or other facility with appropriate resources  Description: INTERVENTIONS:  - Identify barriers to discharge w/patient and caregiver  - Arrange for needed discharge resources and transportation as appropriate  - Identify discharge learning needs (meds, wound care, etc )  - Arrange for interpretive services to assist at discharge as needed  - Refer to Case Management Department for coordinating discharge planning if the patient needs post-hospital services based on physician/advanced practitioner order or complex needs related to functional status, cognitive ability, or social support system  Outcome: Progressing     Problem: Knowledge Deficit  Goal: Patient/family/caregiver demonstrates understanding of disease process, treatment plan, medications, and discharge instructions  Description: Complete learning assessment and assess knowledge base  Interventions:  - Provide teaching at level of understanding  - Provide teaching via preferred learning methods  Outcome: Progressing     Problem: Potential for Falls  Goal: Patient will remain free of falls  Description: INTERVENTIONS:  - Assess patient frequently for physical needs  -  Identify cognitive and physical deficits and behaviors that affect risk of falls    -  [place] fall precautions as indicated by assessment   - Educate patient/family on patient safety including physical limitations  - Instruct patient to call for assistance with activity based on assessment  - Modify environment to reduce risk of injury  - Consider OT/PT consult to assist with strengthening/mobility  Outcome: Progressing

## 2021-05-13 NOTE — SPEECH THERAPY NOTE
MRI note yet completed  Pt screened bedside  Pt A & O  Followed all commands and expressed needs with ease c no focal s/s aphasia or dysarthria this pm   No additional IP evaluation appears indicated

## 2021-05-13 NOTE — ASSESSMENT & PLAN NOTE
61 y o  female w h/o PEH (paraesophageal hernia) s/p laparoscopic paraesophageal hernia w transoral incisionless fundoplication on 4/72, GERD, HTN, Iron anemia, bulimia nervosa, schizoaffective disorder who presented in [facility] ED on 05/11 for laparoscopic paraesophageal hernia repair  Neurology is consulted due to patient feeling confused/difficulty getting the words out/word-finding difficulty, Rapid response at 5:45 pm, Stroke alert was called at 6:01 p m  On 10/44 LKW-uncertain, patient was baseline all day than mother alerted staff that she was not right, possible aphasia,altered, word-finding difficulty without droop or hemiparesis, NIHSS 4, CT head no acute changes, CTA no LVO, tPA not given due to recent surgery/unknown LKW, she was given aspirin 325 mg  MRI 5/14- MRI negative for acute abnormality  Etiology- Likely related to sedative meds/toxic metabolic encephalopathy vs MCI   (probably need neuropsych testing/MOCA as outpatient) ruled out any acute abnormality on MRI    Plan-   · We can d/c aspirin, Atorvastatin   · Patient would benefit neuropsych testing/MOCA as outpatient if no improvement in her word-finding difficulty at time of discharge  · No other acute recommendations at this time, please call for questions or concerns

## 2021-05-13 NOTE — UTILIZATION REVIEW
Initial Clinical Review    OPNCB 5/12 @ 0902 - changed to INPATIENT On 5/13/21 @ 0907 - s/p Stroke alert and neuro workup post op  Admission: Date/Time/Statement:     Inpatient Admission Orders (From admission, onward)     Ordered        05/13/21 0907  Inpatient Admission  Once                   Orders Placed This Encounter   Procedures    Inpatient Admission     Standing Status:   Standing     Number of Occurrences:   1     Order Specific Question:   Level of Care     Answer:   Med Surg [16]     Order Specific Question:   Estimated length of stay     Answer:   More than 2 Midnights     Order Specific Question:   Certification     Answer:   I certify that inpatient services are medically necessary for this patient for a duration of greater than two midnights  See H&P and MD Progress Notes for additional information about the patient's course of treatment  Initial Presentation:   Ms Letha Carcamo is a 62 yo female who presents as an elective surgery admission in the Psychiatric status on 5/11 for REPAIR HERNIA PARAESOPHAGEAL LAPAROSCOPIC, ESOPHAGOGASTRODUODENOSCOPY (EGD), FUNDOPLICATION TRANSORAL INCISIONLESS  Pt has some post op HTN, pain was well controlled  She did have some postop nausea w/o emesis  She is on clear liquid diet , IV fluids, PRN analgesia and antiemetics, Carafate, Protonix  Possible d/c 5/12  Date: 5/12                Day 2:   Pt was doing well post op until 5/12 in the afternoon when she developed an acute change in neuro status  The pt developed expressive aphasia, word finding difficulty, inability to identify some object, some ataxia, slurred speech, tongue deviation to L  A stroke alert was called and NIHSS was 4  She was hemodynamically stable and on oxygen at 2L  Per Neuro it is noted the pt received 2 doses Robaxin and 2 doses oxycontin(total 15mg) today which could be a contributing factor  ASA dose was given  CT head showed no acute change  CTA no LVO    TPA not given d/t recent surgery  MRI brain is pending        Date: 5/13   Day 3:  5/13 - MRI brain pending  Pt is back to normal baseline  Troponins were negative  Continue clear liquid diet  PRN analgesia avoiding Robaxin, PRN antiemetics         Wt Readings from Last 1 Encounters:   05/11/21 72 6 kg (160 lb)     Additional Vital Signs:   05/13/21 07:42:46  97 9 °F (36 6 °C)  84  16  124/72  89  93 %  --  --  --  None (Room air)  --   05/13/21 05:38:08  --  91  18  133/82  99  96 %  --  --  --  --  --   05/13/21 0314  97 9 °F (36 6 °C)  77  16  99/65  76  98 %  --  --  --  --  --   05/13/21 02:07:05  97 8 °F (36 6 °C)  73  18  99/56  70  96 %  --  --  --  --  --   05/12/21 22:16:41  97 7 °F (36 5 °C)  69  18  120/72  88  97 %  --  --  --  --  --   05/12/21 20:42:19  98 6 °F (37 °C)  92  18  139/84  102  95 %  --  --  --  --  --   05/12/21 2011  --  --  --  --  --  --  28  2 L/min  2 L/min  Nasal cannula  --   05/12/21 19:11:15  98 5 °F (36 9 °C)  99  18  142/80  101  93 %  --  --  --  --  --   05/12/21 18:05:35  --  97  --  --  --  94 %  --  --  --  --  --   05/12/21 18:02:35  --  105  --  --  --  93 %  --  --  --  --  --   05/12/21 17:59:35  --  102  --  --  --  95 %  --  --  --  --  --   05/12/21 17:56:35  --  96  --  --  --  93 %  --  --  --  --  --   05/12/21 17:55:06  98 5 °F (36 9 °C)  91  --  129/74  92  94 %  --  --  --  --  --   05/12/21 17:54:46  --  102  --  129/74  --  88 %Abnormal   --  --  --  --  --   05/12/21 17:53:35  --  100  --  --  --  88 %Abnormal   --  --  --  --  --   05/12/21 17:53:03  --  94  --  129/74  92  89 %Abnormal   --  --  --  --  --   05/12/21 16:02:26  98 6 °F (37 °C)  98  20  130/71  91  90 %  --  --  --  --  --   05/12/21 11:03:56  98 8 °F (37 1 °C)  76  16  164/88  113  90 %  --  --  --  --  --   05/12/21 0931  --  --  --  --  --  --  --  --  --  None (Room air)  --   05/12/21 07:53:40  98 2 °F (36 8 °C)  96  16  146/96  113  93 %  --  --  --  --  --   05/12/21 0530  --  --  --  --  -- --  --  --  --  None (Room air)  --   05/12/21 02:59:03  98 1 °F (36 7 °C)  98  18  152/91  111  96 %  --  --  --  --  --   05/12/21 0130  --  88  --  132/80  --  --  --  --  --  --  --   05/11/21 23:04:56  97 2 °F (36 2 °C)Abnormal   90  18  164/114Abnormal   --  98 %  --  --  --  --  --   05/11/21 22:05:27  97 7 °F (36 5 °C)  103  18  164/116Abnormal   --  88 %Abnormal   --  --  --  --  --   05/11/21 2114  --  --  --  166/120Abnormal   --  --  --  --  --  --  --   05/11/21 2100  97 8 °F (36 6 °C)  115Abnormal   18  164/116Abnormal   --  95 %  --  --  --  --  --   05/11/21 19:58:25  97 4 °F (36 3 °C)Abnormal   114Abnormal   18  160/115Abnormal   130  95 %  --  --  --  --  --   05/11/21 1845  98 1 °F (36 7 °C)  114Abnormal   --  152/108Abnormal   96  96 %  --  --  --  --  --   05/11/21 18:16:12  98 2 °F (36 8 °C)  110Abnormal   --  146/105Abnormal   119  94 %  --  2 L/min  --  --  --   05/11/21 1745  --  114Abnormal   17  172/96Abnormal   --  97 %  --  --  --  --  --   05/11/21 1730  --  112Abnormal   18  153/92  --  95 %  --  --  --  --  --   05/11/21 1715  --  112Abnormal   18  172/94Abnormal   --  96 %  --  2 L/min  --  Nasal cannula  --   05/11/21 1700  98 8 °F (37 1 °C)  112Abnormal   15  162/95  --  96 %  --  2 L/min  --  Nasal cannula  X   05/11/21 1645  98 8 °F (37 1 °C)  110Abnormal   20  162/98  --  96 %  --  2 L/min  --  Nasal cannula  --   05/11/21 1630  --  110Abnormal   19  132/91  --  95 %  --  2 L/min  --  Nasal cannula  X   05/11/21 1615  98 °F (36 7 °C)  109Abnormal   22  145/93  --  97 %  --  3 L/min  --  Nasal cannula  X   05/11/21 1600  98 °F (36 7 °C)  109Abnormal   20  150/84  --  97 %  --  6 L/min  --  Simple mask  --   05/11/21 1545  97 3 °F (36 3 °C)Abnormal   108Abnormal   18  163/86  --  95 %  --  6 L/min  --  Simple mask  X   05/11/21 0908  97 6 °F (36 4 °C)  87  18  128/72  --  96 %  --  --  --  None (Room air)  --        Pertinent Labs/Diagnostic Test Results:     5/12 CT brain - No acute intracranial abnormality  Microangiopathic changes  5/12 CTA  Head/neck - No evidence of hemodynamic significant stenosis, aneurysm or dissection  Soft tissue air is identified within the deep right portion of the neck around the vasculature  This is of indeterminate etiology, correlate clinically  Consolidation in the left and right upper lobes and lower lobes is partially visualized could relate to multifocal pneumonia and/or atelectasis  Small amount of anterior left pneumomediastinum of indeterminate origin  5/12 ECG - NSR    5/12 Echo - pending     5/13 MRI Brain -  White matter changes suggestive of chronic microangiopathy    No acute intracranial pathology    Results from last 7 days   Lab Units 05/13/21  0500 05/12/21 1846 05/12/21  0459   WBC Thousand/uL 4 34  --  7 90   HEMOGLOBIN g/dL 8 8*  --  11 7   HEMATOCRIT % 28 9*  --  37 2   PLATELETS Thousands/uL 228 286 345   NEUTROS ABS Thousands/µL 2 98  --  6 95         Results from last 7 days   Lab Units 05/13/21  0501 05/12/21  1847 05/12/21  0459 05/11/21  0925   SODIUM mmol/L 141 134* 135* 134*   POTASSIUM mmol/L 3 9 3 5 3 8 4 0   CHLORIDE mmol/L 109* 103 102 101   CO2 mmol/L 28 27 25 26   ANION GAP mmol/L 4 4 8 7   BUN mg/dL 6 5 5 6   CREATININE mg/dL 0 47* 0 54* 0 46* 0 56*   EGFR ml/min/1 73sq m 108 103 108 102   CALCIUM mg/dL 8 4 8 2* 8 6 9 8         Results from last 7 days   Lab Units 05/12/21  1758   POC GLUCOSE mg/dl 111     Results from last 7 days   Lab Units 05/13/21  0501 05/12/21  1847 05/12/21  0459 05/11/21  0925   GLUCOSE RANDOM mg/dL 91 98 118 87         Results from last 7 days   Lab Units 05/13/21  0500   HEMOGLOBIN A1C % 5 0   EAG mg/dl 97     Results from last 7 days   Lab Units 05/12/21  2354 05/12/21 2056 05/12/21  1846   TROPONIN I ng/mL <0 02 <0 02 <0 02         Results from last 7 days   Lab Units 05/11/21  0925   PROTIME seconds 11 7   INR  0 86   PTT seconds 27       Past Medical History:   Diagnosis Date    Anxiety     Arthritis     Back pain at L4-L5 level     Schreiber esophagus     Bulimia     Colon polyp     Disease of thyroid gland     hypothyroid    Ear problems     GERD (gastroesophageal reflux disease)     History of transfusion     Hyperlipidemia     Hypothyroidism     Leukopenia     Nasal congestion     NMS (neuroleptic malignant syndrome) 01/03/2020    Pneumonia     Rheumatoid arthritis involving right hip (HCC)     Sciatica     Seizure ([facility] [address] )     due to medication mix up 8/2018 only one historically    Seizures ([facility] [address] )     Synovial cyst of lumbar spine     Vertigo     Weight gain      Present on Admission:   Gastroesophageal reflux disease with esophagitis without hemorrhage    Admitting Diagnosis: Esophagitis [K20 90]     Age/Sex: 61 y o  female     Admission Orders:    Scheduled Medications:  acetaminophen, 975 mg, Oral, Q8H [address]  aspirin, 81 mg, Oral, Daily  atorvastatin, 40 mg, Oral, QPM  heparin (porcine), 5,000 Units, Subcutaneous, Q8H YUMIKO  levothyroxine, 25 mcg, Oral, Daily  pantoprazole, 40 mg, Oral, BID AC  PARoxetine, 60 mg, Oral, Daily  QUEtiapine, 400 mg, Oral, HS  sucralfate, 1 g, Oral, 4x Daily (AC & HS)      Continuous IV Infusions:    IV LR @ 100 ml/hr - d/c 0618 5/13  PRN Meds:  hydrALAZINE, 10 mg, Intravenous, Q6H PRN  HYDROmorphone, 0 5 mg, Intravenous, Q3H PRN  oxyCODONE, 10 mg, Oral, Q4H PRN - x 1 5/11, 5/12  oxyCODONE, 5 mg, Oral, Q4H PRN -x 1 5/12  promethazine, 12 5 mg, Intravenous, Q6H PRN -x 1 5/12  Zofran 4 mg IV q 6 hr PRN - x 1 5/11, 5/12 and d/c     SCDs  Neuro checks Every 1 hour x 4 hours, then every 2 hours x 4, then every 4 hours x 72 hours                 Continuous pulse ox  Up w/ assist   MRI Brain   CT brain  CT head/neck   Echo   IP CONSULT TO PHYSICAL MEDICINE REHAB  IP CONSULT TO NEUROLOGY  IP CONSULT TO CASE MANAGEMENT  IP CONSULT TO NUTRITION SERVICES    Network Utilization Review Department  ATTENTION: Please call with any questions or concerns to [phone] and carefully listen to the prompts so that you are directed to the right person  All voicemails are confidential   [facility] ED all requests for admission clinical reviews, approved or denied determinations and any other requests to dedicated fax number below belonging to the campus where the patient is receiving treatment   List of dedicated fax numbers for the Facilities:  [address] DENIALS (Administrative/Medical Necessity) [phone]   [address] (Maternity/NICU/Pediatrics) [phone]   [address] [place] [address] [place] [address] [id number] [place] [address] [address] [place] [address] [phone]

## 2021-05-13 NOTE — CASE MANAGEMENT
CM spoke with the Pt's mother, explained CM program/CM role  LOS- 2 days   Bundle- No    Unplanned readmission color- (21, green low)  30 Day readmit- No      Pt lives with her mother in a 1 story rancher home  Pt has 1 FAMILIA to get into the home  Pt's bedroom is located on the 1st floor  Pt and her mother take care of one another in the home  Pt's mother is 80years old  Pt is independent prior to IP admission at [facility] ([facility] [place])  Pt drives  Pt's PCP is listed as Yuridia Morales MD Phone: [phone]  [facility]'s pharmacy is [facility] as [facility] Phone: [phone] Fax: [phone]  Pt is able to afford all medications in the home  Pt has hx of [facility]  Pt has no DME  Pt does not have any hx of SNF or IP rehabs  Pt has no hx of MH issues or D&A abuse  Pt is a retired  Pt's Medical POA is listed as her mother  Pt has no LW on file with   Pt's mother stated she will drive herself home via car  CM confirmed with mother  DC plan is projected for today 5/13/21  CM will double check if Pt is medically stable to DC  CM will continue to follow the Pt until she is medically stable for DC  CM is following closely

## 2021-05-13 NOTE — CONSULTS
PHYSICAL MEDICINE AND REHABILITATION CONSULT NOTE  Rochelle Coles 61 y o  female MRN: [id number]  [facility] Encounter: [id number]    Requested by (Physician/Service): Андрей Eugene MD  Reason for Consultation:  Assessment of rehabilitation needs    Assessment:  Rehabilitation Diagnosis:   · Debility vs CVA   Impaired mobility and self care    Recommendations:  Rehabilitation Plan:   Continue PT/OT/SLP while on acute care   The patient will likely be able to d/c to home with out pt therapies   Covid-19 Testing: [facility] rehabilitation units require testing within 48 hours of potential admission for all general admissions  Please re-test if needed  *Re-testing is NOT required for patients recovering from COVID-19 infection if isolation has been discontinued per CDC criteria  Medical Co-morbidities Plan:  · GERD  · Esophagitis  · Small hiatal hernia   · Anxiety  · Low back pain  · Neuroeptic malignant syndrome  · Seizure  · Vertigo  · DVT ppx:  Heparin and SCD    Thank you for this consultation  Do not hesitate to contact service with further questions  Leopoldo Archer, CRNP  PM&R    History of Present Illness:  Rochelle Coles is a 61 y o  female with a PMH of GERD with esophagitis and small hiatal hernia, anxiety, back pain at L4-L5, rosas esophagus, bulimia, HLD, neuroeptic malignant syndrome, Rheumatoid arthritis, seizure and vertigo who presented to the [facility] on 5/11/21 for laparoscopic hiatal hernia repair and transoral incisionless fundoplication on 3/33/22  On 5/12/21 she had a change in mental status and stroke alert was called  CT showed no acute abnormality  MRI is pending however it was felt that her change in mental status was likely due to sedating pain medications  PM&R are consulted for rehabilitation recommendations  The patient was seen in her room    She is reporting abdominal pain after having an ECHO done due to having to move around during the test   She reports feeling slow with her words and lightheadedness  She also states that she continues to feel confused  Review of Systems: 10 point ROS negative except for what is noted in HPI    Function:  Prior level of function and living situation:  The patient lives with her mother in a 1 story home with 1 FAMILIA  Her bedroom is on the 1st floor  Her mother is 80years old  Her mother is not able to physically assist but can provide supervision  She was independent for ADLs  Current level of function:  Physical Therapy:  Supervision for bed mobility, transfers, and ambulation without an assistive device   Occupational Therapy:  Independent for eating, grooming, supervision for all other ADLs  Speech Therapy: pending       Physical Exam:  /72   Pulse 84   Temp 97 9 °F (36 6 °C)   Resp 16   Ht 5' 3" (1 6 m)   Wt 72 6 kg (160 lb)   SpO2 93%   BMI 28 34 kg/m²        Intake/Output Summary (Last 24 hours) at 5/13/2021 1000  Last data filed at 5/13/2021 0800  Gross per 24 hour   Intake 3055 ml   Output 2851 ml   Net 204 ml       Body mass index is 28 34 kg/m²  Physical Exam  HENT:      Head: Atraumatic  Eyes:      Extraocular Movements: Extraocular movements intact  Pulmonary:      Effort: Pulmonary effort is normal    Abdominal:      Tenderness: There is abdominal tenderness  Musculoskeletal: Normal range of motion  Skin:     General: Skin is warm  Neurological:      Mental Status: She is alert and oriented to person, place, and time  Comments: Slow to respond at times    Psychiatric:         Mood and Affect: Affect is blunt          Social History:    Social History     Socioeconomic History    Marital status: Single     Spouse name: None    Number of children: None    Years of education: None    Highest education level: None   Occupational History    None   Social Needs    Financial resource strain: None    Food insecurity Worry: None     Inability: None    Transportation needs     Medical: None     Non-medical: None   Tobacco Use    Smoking status: Never Smoker    Smokeless tobacco: Never Used   Substance and Sexual Activity    Alcohol use: Never     Frequency: Never     Binge frequency: Never    Drug use: No    Sexual activity: Not Currently   Lifestyle    Physical activity     Days per week: None     Minutes per session: None    Stress:  To some extent   Relationships    Social connections     Talks on phone: None     Gets together: None     Attends Cheondoism service: None     Active member of club or organization: None     Attends meetings of clubs or organizations: None     Relationship status: None    Intimate partner violence     Fear of current or ex partner: None     Emotionally abused: None     Physically abused: None     Forced sexual activity: None   Other Topics Concern    None   Social History Narrative    Family disruption death of family member parent, per allscripts        Family History:    Family History   Problem Relation Age of Onset    Uterine cancer Mother     Esophageal cancer Father     Colon cancer Maternal Grandmother          Medications:     Current Facility-Administered Medications:     acetaminophen (TYLENOL) tablet 975 mg, 975 mg, Oral, Q8H [address], Bailee Frazier, ABENA, 975 mg at 05/13/21 0533    aspirin (ECOTRIN LOW STRENGTH) EC tablet 81 mg, 81 mg, Oral, Daily, Milbert Duane, MD, 81 mg at 05/13/21 0942    atorvastatin (LIPITOR) tablet 40 mg, 40 mg, Oral, QPM, Milbert Duane, MD, 40 mg at 05/12/21 2129    heparin (porcine) subcutaneous injection 5,000 Units, 5,000 Units, Subcutaneous, Q8H [address], 5,000 Units at 05/13/21 0534 **AND** [COMPLETED] Platelet count, , , Once, Leighton Gomez MD    hydrALAZINE (APRESOLINE) injection 10 mg, 10 mg, Intravenous, Q6H PRN, Last Noble MD    HYDROmorphone (DILAUDID) injection 0 5 mg, 0 5 mg, Intravenous, Q3H PRN, Leighton Gomez, MD    levothyroxine tablet 25 mcg, 25 mcg, Oral, Daily, Gómez Larsen MD, 25 mcg at 05/13/21 0942    oxyCODONE (ROXICODONE) IR tablet 10 mg, 10 mg, Oral, Q4H PRN, Gómez Larsen MD, 10 mg at 05/12/21 1335    oxyCODONE (ROXICODONE) IR tablet 5 mg, 5 mg, Oral, Q4H PRN, Gómez Larsen MD, 5 mg at 05/12/21 0831    pantoprazole (PROTONIX) EC tablet 40 mg, 40 mg, Oral, BID AC, Dara Mcgill MD, 40 mg at 05/13/21 0534    PARoxetine (PAXIL) tablet 60 mg, 60 mg, Oral, Daily, Gómez Larsen MD, 60 mg at 05/13/21 1835    promethazine (PHENERGAN) injection 12 5 mg, 12 5 mg, Intravenous, Q6H PRN, ABENA Chin, 12 5 mg at 05/12/21 1113    QUEtiapine (SEROquel) tablet 400 mg, 400 mg, Oral, HS, Gómez Larsen MD, 400 mg at 05/12/21 2129    sucralfate (CARAFATE) tablet 1 g, 1 g, Oral, 4x Daily (AC & HS), Dara Mcgill MD, 1 g at 05/13/21 0534    Facility-Administered Medications Ordered in Other Encounters:     mineral oil liquid 15 mL, 15 mL, Oral, Once, Belgica Vizcaino MD    Past Medical History:     Past Medical History:   Diagnosis Date    Anxiety     Arthritis     Back pain at L4-L5 level     Schreiber esophagus     Bulimia     Colon polyp     Disease of thyroid gland     hypothyroid    Ear problems     GERD (gastroesophageal reflux disease)     History of transfusion     Hyperlipidemia     Hypothyroidism     Leukopenia     Nasal congestion     NMS (neuroleptic malignant syndrome) 01/03/2020    Pneumonia     Rheumatoid arthritis involving right hip ([facility] [address]     Sciatica     Seizure ([facility] [address] )     due to medication mix up 8/2018 only one historically    Seizures ([facility] [address] )     Synovial cyst of lumbar spine     Vertigo     Weight gain         Past Surgical History:     Past Surgical History:   Procedure Laterality Date    BONE MARROW BIOPSY      BREAST SURGERY      enlargement     CLOSED REDUCTION DISTAL FEMUR FRACTURE      COLONOSCOPY      COSMETIC SURGERY      HIP ARTHROPLASTY Right 1/2/2020    Procedure: REVISION TOTAL HIP ARTHROPLASTY WITH REPAIR OF PARIPROSTHETIC FRACTURE - POSTERIOR APPROACH;  Surgeon: Boston Mcnamara MD;  [facility];  Service: Orthopedics    VA ESOPHAGOGASTRODUODENOSCOPY TRANSORAL DIAGNOSTIC N/A 5/11/2021    Procedure: ESOPHAGOGASTRODUODENOSCOPY (EGD); Surgeon: Андрей Eugene MD;  [facility];  Service: General    VA ESOPHAGUS SURG PROCEDURE UNLISTED N/A 5/11/2021    Procedure: FUNDOPLICATION TRANSORAL INCISIONLESS;  Surgeon: Herminia Skinner MD;  [facility];  Service: Gastroenterology    VA LAP, REPAIR PARAESOPHAGEAL HERNIA, INCL FUNDOPLASTY W/ MESH N/A 5/11/2021    Procedure: REPAIR HERNIA PARAESOPHAGEAL  LAPAROSCOPIC;  Surgeon: Андрей Eugene MD;  [facility];  Service: General    VA TOTAL HIP ARTHROPLASTY Right 12/11/2019    Procedure: ARTHROPLASTY HIP TOTAL ANTERIOR;  Surgeon: Boston Mcnamara MD;  [facility];  Service: Orthopedics    RHINOPLASTY      SINUS SURGERY           Allergies: Allergies   Allergen Reactions    Risperdal [Risperidone] Shortness Of Breath     Rapid heart beat, SOB    Zyprexa [Olanzapine] Shortness Of Breath     Rapid heartbeat    Latex Rash     Band aids, adhesives wears normal underwear, can eat bananas  USE PAPER TAPE           LABORATORY RESULTS:      Lab Results   Component Value Date    HGB 8 8 (L) 05/13/2021    HCT 28 9 (L) 05/13/2021    WBC 4 34 05/13/2021     Lab Results   Component Value Date    BUN 6 05/13/2021    BUN 9 10/06/2020     06/01/2017    K 3 9 05/13/2021    K 5 0 10/06/2020     (H) 05/13/2021    CL 98 10/06/2020    GLUCOSE 155 (H) 01/02/2020    CREATININE 0 47 (L) 05/13/2021    CREATININE 0 83 06/01/2017     Lab Results   Component Value Date    PROTIME 11 7 05/11/2021    INR 0 86 05/11/2021        DIAGNOSTIC STUDIES: Reviewed  Xr Chest 1 View Portable    Result Date: 4/22/2021  Impression: No acute cardiopulmonary disease    Workstation performed: [id number]

## 2021-05-13 NOTE — OCCUPATIONAL THERAPY NOTE
Occupational Therapy Evaluation     Patient Name: Siri Walters  JCKDV'T Date: 5/13/2021  Problem List  Principal Problem:    Gastroesophageal reflux disease with esophagitis without hemorrhage  Active Problems:    Word finding difficulty    Past Medical History  Past Medical History:   Diagnosis Date    Anxiety     Arthritis     Back pain at L4-L5 level     Schreiber esophagus     Bulimia     Colon polyp     Disease of thyroid gland     hypothyroid    Ear problems     GERD (gastroesophageal reflux disease)     History of transfusion     Hyperlipidemia     Hypothyroidism     Leukopenia     Nasal congestion     NMS (neuroleptic malignant syndrome) 01/03/2020    Pneumonia     Rheumatoid arthritis involving right hip (HCC)     Sciatica     Seizure ([address] )     due to medication mix up 8/2018 only one historically    Seizures ([address] )     Synovial cyst of lumbar spine     Vertigo     Weight gain      Past Surgical History  Past Surgical History:   Procedure Laterality Date    BONE MARROW BIOPSY      BREAST SURGERY      enlargement     CLOSED REDUCTION DISTAL FEMUR FRACTURE      COLONOSCOPY      COSMETIC SURGERY      HIP ARTHROPLASTY Right 1/2/2020    Procedure: REVISION TOTAL HIP ARTHROPLASTY WITH REPAIR OF PARIPROSTHETIC FRACTURE - POSTERIOR APPROACH;  Surgeon: Karyn Kapoor MD;  [facility];  Service: Orthopedics    [place] ESOPHAGOGASTRODUODENOSCOPY TRANSORAL DIAGNOSTIC N/A 5/11/2021    Procedure: ESOPHAGOGASTRODUODENOSCOPY (EGD); Surgeon: Orlando Villalobos MD;  [facility];  Service: General    TN ESOPHAGUS SURG PROCEDURE UNLISTED N/A 5/11/2021    Procedure: FUNDOPLICATION TRANSORAL INCISIONLESS;  Surgeon: Sofy Horner MD;  [facility];  Service: Gastroenterology    [place] LAP, REPAIR PARAESOPHAGEAL HERNIA, INCL FUNDOPLASTY W/ MESH N/A 5/11/2021    Procedure: REPAIR HERNIA PARAESOPHAGEAL  LAPAROSCOPIC;  Surgeon:  Orlando Villalobos MD;  [facility]; Service: General    [place] TOTAL HIP ARTHROPLASTY Right 12/11/2019    Procedure: ARTHROPLASTY HIP TOTAL ANTERIOR;  Surgeon: Azra Oglesby MD;  [facility];  Service: Orthopedics    RHINOPLASTY      SINUS SURGERY           05/13/21 0858   OT Last Visit   OT Visit Date 05/13/21   Note Type   Note type Evaluation   Restrictions/Precautions   Weight Bearing Precautions Per Order No   Other Precautions Telemetry; Fall Risk   Pain Assessment   Pain Assessment Tool Pain Assessment not indicated - pt denies pain   Pain Score No Pain   Home Living   Type of [address] One level;Performs ADLs on one level; Able to live on main level with bedroom/bathroom; Laundry in basement  (1STE)   Bathroom Shower/Tub Tub/shower unit   Bathroom Toilet Standard   Bathroom Equipment Grab bars in shower   [address] bars; Sock aid;Reacher;Long-handled shoehorn  (Leonardo Rudder from posterior ELIZABETH approx~2 years ago, recent revision )   Prior Function   Level of [place] Independent with ADLs and functional mobility   Lives With Other (Comment)  (mother)   Receives Help From Family   ADL Assistance Independent   IADLs Independent   Falls in the last 6 months 0   Lifestyle   Autonomy I with ADL's/IADL's, no AD with funtional ambulation, +drives (shares homemaking with mother)   Reciprocal Relationships mother   Intrinsic Gratification staying active   Psychosocial   Psychosocial (WDL) WDL   ADL   Eating Assistance 7  Independent   Grooming Assistance 7  Independent   UB Bathing Assistance 5  Supervision/Setup   UB Bathing Deficit Setup   LB Bathing Assistance 5  Supervision/Setup   LB Bathing Deficit Setup   UB Dressing Assistance 5  Supervision/Setup   UB Dressing Deficit Setup   LB Dressing Assistance 5  Supervision/Setup   LB Dressing Deficit Setup;Don/doff R sock; Don/doff L sock   Toileting Assistance  5  Supervision/Setup   Bed Mobility   Supine to Sit 5  Supervision   Additional items Assist x 1   Transfers   Sit to Stand 5  Supervision   Stand to Sit 5  Supervision   Stand pivot 5  Supervision   Additional items   (no AD)   Toilet transfer 5  Supervision   Additional items Assist x 1;Standard toilet   Additional Comments (-) AD   Functional Mobility   Functional Mobility 5  Supervision   Additional Comments S without AD, no LOB or VANG noted, mildly impulsive (supsect due to prior [place] levels)   Additional items   (no AD)   Balance   Static Sitting Normal   Dynamic Sitting Good   Static Standing Fair +   Dynamic Standing Fair   Ambulatory Fair   Activity Tolerance   Activity Tolerance Patient tolerated treatment well   Medical Staff Made Aware PT [address]   Nurse Made Aware RN cleared pt for therapy   RUE Assessment   RUE Assessment WFL   LUE Assessment   LUE Assessment WFL   Hand Function   Gross Motor Coordination Functional   Cognition   Overall Cognitive Status Impaired   Arousal/Participation Alert; Responsive   Attention Attends with cues to redirect   Orientation Level Oriented to person;Oriented to place;Oriented to time  (grossly with choice cues to date/place -expressive aphasia ()   Memory Decreased recall of precautions;Decreased recall of recent events   Following Commands Follows one step commands with increased time or repetition   Comments pt motivated for therapy, flat affect, word finding difficulties/expressive aphasia with evaluative questions, given choice cues for orientation questions, able to c hoose correct response, able to provide social history, mildly impulsive, continue to assess +drives   Assessment   Limitation Decreased ADL status; Decreased cognition;Decreased self-care trans;Decreased high-level ADLs   Prognosis Good   Assessment Pt is a 61 y o  female who was [facility] with Gastroesophageal reflux disease with esophagitis without hemorrhage laparoscopic paraesophageal hernia repair on 05/11   Pt's problem list also includes PMH of  has a past medical history of Anxiety, Arthritis, Back pain at L4-L5 level, Schreiber esophagus, Bulimia, Colon polyp, Disease of thyroid gland, Ear problems, GERD (gastroesophageal reflux disease), History of transfusion, Hyperlipidemia, Hypothyroidism, Leukopenia, Nasal congestion, NMS (neuroleptic malignant syndrome) (01/03/2020), Pneumonia, Rheumatoid arthritis involving right hip ([facility] [address] ), Sciatica, Seizure ([facility] [address] ), Seizures ([facility] [address] ), Synovial cyst of lumbar spine, Vertigo, and Weight gain    At baseline pt was completing I with ADL;'s, shares homemaking tasks with mother, +drives, no AD with functional ambulation    Pt lives with mother in a [facility] with 1STE  Currently pt requires S for overall ADLS and S without AD for functional mobility/transfers  Pt currently presents with impairments in the following categories -steps to enter environment, difficulty performing ADLS and difficulty performing IADLS  memory, communication and interpersonal skills  These impairments, as well as pt's risk for falls  limit pt's ability to safely engage in all baseline areas of occupation, includinglaundry , driving, house maintenance, medication management, meal prep, social participation  and leisure activities  The patient's raw score on the AM-PAC Daily Activity inpatient short form is 24, standardized score is 57 54, greater than 39 4  Patients at this level are likely to benefit from discharge to home with outpatient OT pending cognitive assessment    Please refer to the recommendation of the Occupational Therapist for safe discharge planning  From OT standpoint, recommend home with increased family support, continue to assess cognition for driving safety upon D/C  OT will continue to follow to address the below stated goals      Goals   Patient Goals walk   LTG Time Frame 10-14   Long Term Goal #1 see goals below   Plan   Treatment Interventions ADL retraining;Functional transfer training;Continued evaluation;Patient/family training;Cognitive reorientation; Compensatory technique education   Goal Expiration Date 05/27/21   OT Frequency 3-5x/wk   Recommendation   OT Discharge Recommendation Home with family support with   (oupt OT pending cognitive evaluation)   Equipment Recommended   (no DME needs)   OT - OK to Discharge No   Additional Comments  pt educated on shower chair information with places and types to purchase -pt receptive stating( "I have to measure  My tub first")   AM-PAC Daily Activity Inpatient   Lower Body Dressing 4   Bathing 4   Toileting 4   Upper Body Dressing 4   Grooming 4   Eating 4   Daily Activity Raw Score 24   Daily Activity Standardized Score (Calc for Raw Score >=11) 57 54   AM-PAC Applied Cognition Inpatient   Following a Speech/Presentation 3   Understanding Ordinary Conversation 4   Taking Medications 4   Remembering Where Things Are Placed or Put Away 4   Remembering List of 4-5 Errands 4   Taking Care of Complicated Tasks 3   Applied Cognition Raw Score 22   Applied Cognition Standardized Score 47 83        Occupational Therapy Goals:    *Mod I with bed mobility to engage in functional tasks  *Mod I Adl's after setup with use of AE PRN  *Mod I toileting and clothing management   *Mod I functional mobility and transfers to/from all surfaces with Fair + dynamic balance and safety for participation in dynamic adls and iadl tasks   *Assess DME needs   *Increase activity tolerance to 25-30 minutes for participation in adls and enjoyable activities  *Pt to participate in further cognitive testing with good attention and participation to assist with safe d/c recommendations  *Pt will follow 100% simple one step verbal commands and be A/Ox4 consistently t/o use of external environmental cues w/ mod I  Pt will independently identify and utilize 2-3 coping strategies to increase positive affect and promote overall well-being    *Demonstrate good carryover of pt/family education and training with good tolerance for increased safety and independence with ADL's/ADl's      Sowmya Tinoco MOT, OTR/L

## 2021-05-13 NOTE — CASE MANAGEMENT
CM attempted to call the Pt's mother to confirm DC planning  CM left vm with mother  CM will wait for Pt's mother to call back  CM is following closely until DC

## 2021-05-13 NOTE — QUICK NOTE
Nurse-Patient-Provider rounds were completed with the patient's nurse today, April  We discussed the plan is to continue supportive care  Encourage out of bed and ambulation throughout hallways  Continue to monitor  We reviewed all of the invasive devices/lines/telemetry orders   - None  DVT Prophylaxis:  SQH and SCDs    Pain Assessment / Plan:  - Continue current analgesic regimen  Mobility Assessment / Plan:  - Activity as tolerated  Goals / Barriers for discharge:  Patient is medically and surgically cleared for discharge today  Patient states she does not want to go home because she does not feel she is ready  Midepigastric pain is appropriate for postoperative surgical intervention  Discussed with Dr Alka Ludwig  Plan for discharge on 05/14  Case management following; case and discharge needs discussed  All questions and concerns were addressed  I spent greater than 14 minutes reviewing the plan with the patient and the nurse, and coordinating care for the day      Iain Mcwilliams PA-C  5/13/2021

## 2021-05-13 NOTE — PHYSICAL THERAPY NOTE
Physical Therapy Evaluation Note     Patient Name: Christopher Rabago    YZGCT'A Date: 5/13/2021     Problem List  Principal Problem:    Gastroesophageal reflux disease with esophagitis without hemorrhage  Active Problems:    Word finding difficulty       Past Medical History  Past Medical History:   Diagnosis Date    Anxiety     Arthritis     Back pain at L4-L5 level     Schreiber esophagus     Bulimia     Colon polyp     Disease of thyroid gland     hypothyroid    Ear problems     GERD (gastroesophageal reflux disease)     History of transfusion     Hyperlipidemia     Hypothyroidism     Leukopenia     Nasal congestion     NMS (neuroleptic malignant syndrome) 01/03/2020    Pneumonia     Rheumatoid arthritis involving right hip (HCC)     Sciatica     Seizure ([facility] [address] )     due to medication mix up 8/2018 only one historically    Seizures ([facility] [address] )     Synovial cyst of lumbar spine     Vertigo     Weight gain         Past Surgical History  Past Surgical History:   Procedure Laterality Date    BONE MARROW BIOPSY      BREAST SURGERY      enlargement     CLOSED REDUCTION DISTAL FEMUR FRACTURE      COLONOSCOPY      COSMETIC SURGERY      HIP ARTHROPLASTY Right 1/2/2020    Procedure: REVISION TOTAL HIP ARTHROPLASTY WITH REPAIR OF PARIPROSTHETIC FRACTURE - POSTERIOR APPROACH;  Surgeon: Mira Stevens MD;  [facility];  Service: Orthopedics    AR ESOPHAGOGASTRODUODENOSCOPY TRANSORAL DIAGNOSTIC N/A 5/11/2021    Procedure: ESOPHAGOGASTRODUODENOSCOPY (EGD); Surgeon:  Alma Pineda MD;  [facility];  Service: General    AR ESOPHAGUS SURG PROCEDURE UNLISTED N/A 5/11/2021    Procedure: FUNDOPLICATION TRANSORAL INCISIONLESS;  Surgeon: Nohemi Taylor MD;  [facility];  Service: Gastroenterology    AR LAP, REPAIR PARAESOPHAGEAL HERNIA, INCL FUNDOPLASTY W/ MESH N/A 5/11/2021    Procedure: REPAIR HERNIA PARAESOPHAGEAL LAPAROSCOPIC;  Surgeon: Gautam Nam MD;  [facility];  Service: General    NE TOTAL HIP ARTHROPLASTY Right 12/11/2019    Procedure: ARTHROPLASTY HIP TOTAL ANTERIOR;  Surgeon: Carolina Cagle MD;  [facility];  Service: Orthopedics    RHINOPLASTY      SINUS SURGERY        05/13/21 0844   PT Last Visit   PT Visit Date 05/13/21   Note Type   Note type Evaluation   Pain Assessment   Pain Assessment Tool Pain Assessment not indicated - pt denies pain   Pain Score No Pain   Home Living   Type of [address] One level   Additional Comments Pt lives with her mother in a [facility] with 2 FAMILIA  Pt was I for ADL's and IADL's  Pt did not use any DME prior  Pt owns a RW and SPC  Pt assist her mother with IADL's but not physically     Prior Function   Level of [place] Independent with ADLs and functional mobility   Lives With Other (Comment)  (mother)   Receives Help From Family   ADL Assistance Independent   IADLs Independent   Falls in the last 6 months 0   Restrictions/Precautions   Weight Bearing Precautions Per Order No   Other Precautions Fall Risk   General   Family/Caregiver Present No   Cognition   Overall Cognitive Status Impaired   Arousal/Participation Alert   Attention Within functional limits   RLE Assessment   RLE Assessment WFL   LLE Assessment   LLE Assessment WFL   Coordination   Sensation WFL   Light Touch   RLE Light Touch Grossly intact   LLE Light Touch Grossly intact   Bed Mobility   Supine to Sit 5  Supervision   Additional Comments sitting EOB at a S level   Transfers   Sit to Stand 5  Supervision   Stand to Sit 5  Supervision   Ambulation/Elevation   Gait pattern WNL   Gait Assistance 5  Supervision   Assistive Device None   Distance [id number]   Stair Management Assistance 5  Supervision   Stair Management Technique One rail R   Number of Stairs 7   Balance   Static Sitting Normal   Dynamic Sitting Good   Static Standing Fair +   Dynamic Standing Fair +   Ambulatory Fair Endurance Deficit   Endurance Deficit No   Activity Tolerance   Activity Tolerance Patient tolerated treatment well   Medical Staff Made Aware OT   Nurse Made Aware nurse approved therapy sesion   Assessment   Prognosis Excellent   Problem List Decreased mobility   Assessment Pt is a 60 yo female admitted to [place] [address] on 5/11/2021 s/p surgery for repair of hernia  Pt had a rapid response due to an acute change in neuro status  Two patient identifiers were used to confirm  Pt lives with her mother in a [facility] with 2 FAMILIA  Pt was I for ADL's and mobility prior  Pt did not use any DME but owns RW and SPC  Pt and her mother share task such as cleaning but she does the food shopping  Pt was I for ADL's  Pt's impairments include reduced mobility, expressive aphasia  Recommend discharge to home  At the end of the session the patient was left in seated position with call bell and phone within reach  Pt educate about the importance of ambulating during the day with staff  Chair alarm placed per request of RN  PT will be D/C from PT due to being near her baseline for mobility  See OT assessment for cognitive evaluation     Recommendation   PT Discharge Recommendation No rehabilitation needs   PT - OK to Discharge Yes   Additional Comments when medically stable   AM-PAC Basic Mobility Inpatient   Turning in Bed Without Bedrails 4   Lying on Back to Sitting on Edge of Flat Bed 4   Moving Bed to Chair 4   Standing Up From Chair 4   Walk in Room 4   Climb 3-5 Stairs 3   Basic Mobility Inpatient Raw Score 23   Basic Mobility Standardized Score 50 88   Ammy Hsu, Pt, DPT

## 2021-05-13 NOTE — PHYSICIAN ADVISOR
Current patient class: Outpatient Surgery  The patient is currently on Hospital Day: 3 at [address] [place] [address]      This patient was originally admitted to the hospital under Outpatient class  After admission the patient was reevaluated and determined to require further hospitalization  The patient was then documented to require at least a 2nd midnight in the hospital  As such the patient was then expected to satisfy the 2 midnight benchmark and was therefore eligible for inpatient admission  After review of the relevant documentation, labs, vital signs and test results, the patient is appropriate for INPATIENT ADMISSION  Admission to the hospital as an inpatient is a complex decision making process which requires the practitioner to consider the patients presenting complaint, history and physical examination and all relevant testing  With this in mind, in this case, the patient was deemed appropriate for INPATIENT ADMISSION  After review of the documentation and testing available at the time of the admission I concur with this clinical determination of medical necessity  Rationale is as follows: The patient is a 61 yrs Female who presented to the ED at 5/11/2021  8:09 AM with a chief complaint of  Severe esophagitis secondary to paraesophageal hernia  Patient came in for repair of the hernia with EGD and fundoplication  Postoperatively the patient was doing okay initially however a stroke alert was called secondary to acute change in mental status  Patient became aphasic  Patient was seen by Neurology and is awaiting a stroke workup at this time  Given the postop complications with the acute encephalopathy with aphasia with need for further diagnostic workup, I am recommending changed to inpatient  Class    The patients vitals on arrival were   ED Triage Vitals   Temperature Pulse Respirations Blood Pressure SpO2   05/11/21 0908 05/11/21 0908 05/11/21 0908 05/11/21 0908 05/11/21 0908   97 6 °F (36 4 °C) 87 18 128/72 96 %      Temp Source Heart Rate Source Patient Position - Orthostatic VS BP Location FiO2 (%)   05/11/21 0908 05/11/21 0908 -- 05/11/21 2114 --   Tympanic Monitor  Right arm       Pain Score       05/11/21 0908       8           Past Medical History:   Diagnosis Date    Anxiety     Arthritis     Back pain at L4-L5 level     Schreiber esophagus     Bulimia     Colon polyp     Disease of thyroid gland     hypothyroid    Ear problems     GERD (gastroesophageal reflux disease)     History of transfusion     Hyperlipidemia     Hypothyroidism     Leukopenia     Nasal congestion     NMS (neuroleptic malignant syndrome) 01/03/2020    Pneumonia     Rheumatoid arthritis involving right hip (HCC)     Sciatica     Seizure ([facility] [address] )     due to medication mix up 8/2018 only one historically    Seizures ([facility] [address] )     Synovial cyst of lumbar spine     Vertigo     Weight gain      Past Surgical History:   Procedure Laterality Date    BONE MARROW BIOPSY      BREAST SURGERY      enlargement     CLOSED REDUCTION DISTAL FEMUR FRACTURE      COLONOSCOPY      COSMETIC SURGERY      HIP ARTHROPLASTY Right 1/2/2020    Procedure: REVISION TOTAL HIP ARTHROPLASTY WITH REPAIR OF PARIPROSTHETIC FRACTURE - POSTERIOR APPROACH;  Surgeon: Vince Benítez MD;  [facility];  Service: Orthopedics    KS ESOPHAGOGASTRODUODENOSCOPY TRANSORAL DIAGNOSTIC N/A 5/11/2021    Procedure: ESOPHAGOGASTRODUODENOSCOPY (EGD); Surgeon: Cain Sykes MD;  [facility];  Service: General    KS ESOPHAGUS SURG PROCEDURE UNLISTED N/A 5/11/2021    Procedure: FUNDOPLICATION TRANSORAL INCISIONLESS;  Surgeon: Hermilo Camara MD;  [facility];  Service: Gastroenterology    KS LAP, REPAIR PARAESOPHAGEAL HERNIA, INCL FUNDOPLASTY W/ MESH N/A 5/11/2021    Procedure: REPAIR HERNIA PARAESOPHAGEAL  LAPAROSCOPIC;  Surgeon:  Cain Sykes MD;  [facility];  Service: Debbie Mercado [place] TOTAL HIP ARTHROPLASTY Right 12/11/2019    Procedure: ARTHROPLASTY HIP TOTAL ANTERIOR;  Surgeon: Shazia Elias MD;  [facility];  Service: Orthopedics    RHINOPLASTY      SINUS SURGERY         The patient was admitted to the hospital at [address] [place] [address] on 5/11/21 for the following diagnosis:  Esophagitis [K20 90]    Consults have been placed to:   IP CONSULT TO NEUROLOGY  IP CONSULT TO NEUROLOGY  IP CONSULT TO PHYSICAL MEDICINE REHAB  IP CONSULT TO NEUROLOGY  IP CONSULT TO CASE MANAGEMENT  IP CONSULT TO NUTRITION SERVICES    Vitals:    05/13/21 0207 05/13/21 0314 05/13/21 0538 05/13/21 0742   BP: 99/56 99/65 133/82 124/72   Pulse: 73 77 91 84   Resp: 18 16 18 16   Temp: 97 8 °F (36 6 °C) 97 9 °F (36 6 °C)  97 9 °F (36 6 °C)   TempSrc:       SpO2: 96% 98% 96% 93%   Weight:       Height:           Most recent labs:    Recent Labs     05/11/21  0925  05/12/21  2354 05/13/21  0500 05/13/21  0501   WBC  --    < >  --  4 34  --    HGB  --    < >  --  8 8*  --    HCT  --    < >  --  28 9*  --    PLT  --    < >  --  228  --    K 4 0   < >  --   --  3 9   CALCIUM 9 8   < >  --   --  8 4   BUN 6   < >  --   --  6   CREATININE 0 56*   < >  --   --  0 47*   INR 0 86  --   --   --   --    TROPONINI  --    < > <0 02  --   --     < > = values in this interval not displayed         Scheduled Meds:  Current Facility-Administered Medications   Medication Dose Route Frequency Provider Last Rate    acetaminophen  975 mg Oral Q8H [address] Bailee Luis ABENA Hardy      aspirin  81 mg Oral Daily Jared Villa MD      atorvastatin  40 mg Oral QPM Jarde Villa MD      heparin (porcine)  5,000 Units Subcutaneous [facility] Tana Steel MD      hydrALAZINE  10 mg Intravenous Q6H PRN Sebastian Haywood MD      HYDROmorphone  0 5 mg Intravenous Q3H PRN Tana Steel MD      levothyroxine  25 mcg Oral Daily Tana Steel MD      oxyCODONE  10 mg Oral Q4H PRN Tana Steel MD      oxyCODONE  5 mg Oral Q4H PRN Dennie Ka Darrell Rodgers MD      pantoprazole  40 mg Oral BID AC Sebastian Haywood MD      PARoxetine  60 mg Oral Daily Tana Steel MD      promethazine  12 5 mg Intravenous Q6H PRN ABENA Whitmore      QUEtiapine  400 mg Oral HS Tana Steel MD      sucralfate  1 g Oral 4x Daily (AC & HS) Sebastian Haywood MD       Facility-Administered Medications Ordered in Other Encounters   Medication Dose Route Frequency Provider Last Rate    mineral oil  15 mL Oral Once Chandu Wang MD       Continuous Infusions:   PRN Meds: hydrALAZINE    HYDROmorphone    oxyCODONE    oxyCODONE    promethazine    Surgical procedures (if appropriate):  Procedure(s):  REPAIR HERNIA PARAESOPHAGEAL  LAPAROSCOPIC  ESOPHAGOGASTRODUODENOSCOPY (EGD)  FUNDOPLICATION TRANSORAL INCISIONLESS

## 2021-05-13 NOTE — SPEECH THERAPY NOTE
Consult for Speech/Language Evaluation received  Records reviewed  Aware pt is a 60 yo F with PMH of GERD, esophagitis, and small hiatal hernia, now s/p laparoscopic hiatal hernia repair and transoral incisionless fundoplication on 5/03  Stroke alert called overnight due to concern for altered mental status  Mental status improved this am to normal, likely related to robaxin administration  Pt passed RN Dysphagia Assessment and tolerated breakfast tray with no s/s reported  MRI pending (await results;  Speech/Language Evaluation may no longer be indicated)

## 2021-05-14 ENCOUNTER — TRANSITIONAL CARE MANAGEMENT (OUTPATIENT)
Dept: INTERNAL MEDICINE CLINIC | Facility: CLINIC | Age: 61
End: 2021-05-14

## 2021-05-14 VITALS
HEIGHT: 63 IN | OXYGEN SATURATION: 97 % | TEMPERATURE: 97.8 F | BODY MASS INDEX: 28.35 KG/M2 | HEART RATE: 79 BPM | DIASTOLIC BLOOD PRESSURE: 73 MMHG | WEIGHT: 160 LBS | SYSTOLIC BLOOD PRESSURE: 125 MMHG | RESPIRATION RATE: 16 BRPM

## 2021-05-14 PROCEDURE — 99024 POSTOP FOLLOW-UP VISIT: CPT | Performed by: SURGERY

## 2021-05-14 PROCEDURE — NC001 PR NO CHARGE: Performed by: SURGERY

## 2021-05-14 PROCEDURE — NC001 PR NO CHARGE: Performed by: PSYCHIATRY & NEUROLOGY

## 2021-05-14 RX ORDER — ACETAMINOPHEN 325 MG/1
975 TABLET ORAL EVERY 8 HOURS SCHEDULED
Qty: 30 TABLET | Refills: 0 | Status: SHIPPED | OUTPATIENT
Start: 2021-05-14

## 2021-05-14 RX ORDER — OXYCODONE HYDROCHLORIDE 5 MG/1
5 TABLET ORAL EVERY 4 HOURS PRN
Qty: 20 TABLET | Refills: 0 | Status: SHIPPED | OUTPATIENT
Start: 2021-05-14 | End: 2021-05-24

## 2021-05-14 RX ORDER — PANTOPRAZOLE SODIUM 40 MG/1
40 TABLET, DELAYED RELEASE ORAL
Qty: 28 TABLET | Refills: 0 | Status: SHIPPED | OUTPATIENT
Start: 2021-05-14 | End: 2021-07-13 | Stop reason: SDUPTHER

## 2021-05-14 RX ORDER — ATORVASTATIN CALCIUM 40 MG/1
40 TABLET, FILM COATED ORAL EVERY EVENING
Qty: 30 TABLET | Refills: 0 | Status: ON HOLD | OUTPATIENT
Start: 2021-05-14 | End: 2022-01-12 | Stop reason: CLARIF

## 2021-05-14 RX ORDER — ONDANSETRON 4 MG/1
4 TABLET, ORALLY DISINTEGRATING ORAL EVERY 6 HOURS PRN
Qty: 20 TABLET | Refills: 0 | Status: SHIPPED | OUTPATIENT
Start: 2021-05-14 | End: 2021-07-13 | Stop reason: SDUPTHER

## 2021-05-14 RX ORDER — SUCRALFATE ORAL 1 G/10ML
1 SUSPENSION ORAL 4 TIMES DAILY
Qty: 560 ML | Refills: 0 | Status: SHIPPED | OUTPATIENT
Start: 2021-05-14 | End: 2021-06-03

## 2021-05-14 RX ORDER — ASPIRIN 81 MG/1
81 TABLET ORAL DAILY
Qty: 30 TABLET | Refills: 0 | Status: ON HOLD | OUTPATIENT
Start: 2021-05-15 | End: 2022-01-12 | Stop reason: CLARIF

## 2021-05-14 RX ADMIN — PAROXETINE HYDROCHLORIDE 60 MG: 20 TABLET, FILM COATED ORAL at 08:15

## 2021-05-14 RX ADMIN — ASPIRIN 81 MG: 81 TABLET, COATED ORAL at 08:15

## 2021-05-14 RX ADMIN — SUCRALFATE 1 G: 1 TABLET ORAL at 07:18

## 2021-05-14 RX ADMIN — HEPARIN SODIUM 5000 UNITS: 5000 INJECTION INTRAVENOUS; SUBCUTANEOUS at 07:18

## 2021-05-14 RX ADMIN — PANTOPRAZOLE SODIUM 40 MG: 40 TABLET, DELAYED RELEASE ORAL at 07:18

## 2021-05-14 RX ADMIN — LEVOTHYROXINE SODIUM 25 MCG: 25 TABLET ORAL at 08:15

## 2021-05-14 RX ADMIN — ACETAMINOPHEN 975 MG: 325 TABLET, FILM COATED ORAL at 07:16

## 2021-05-14 NOTE — PROGRESS NOTES
NEUROLOGY RESIDENCY PROGRESS NOTE     Name: Keith Campos   Age & Sex: 61 y o  female   MRN: [id number]  Unit/Bed#: [facility] 805-01   Encounter: [id number]    Keith Campos will need follow up in in 4 weeks with movement disorder and memory attending or advance practitioner  ASSESSMENT & PLAN     Word finding difficulty  Assessment & Plan  61 y o  female w h/o PEH (paraesophageal hernia) s/p laparoscopic paraesophageal hernia w transoral incisionless fundoplication on 0/68, GERD, HTN, Iron anemia, bulimia nervosa, schizoaffective disorder who presented in [facility] ED on 05/11 for laparoscopic paraesophageal hernia repair  Neurology is consulted due to patient feeling confused/difficulty getting the words out/word-finding difficulty, Rapid response at 5:45 pm, Stroke alert was called at 6:01 p m  On 38/40 LKW-uncertain, patient was baseline all day than mother alerted staff that she was not right, possible aphasia,altered, word-finding difficulty without droop or hemiparesis, NIHSS 4, CT head no acute changes, CTA no LVO, tPA not given due to recent surgery/unknown LKW, she was given aspirin 325 mg  MRI 5/14- MRI negative for acute abnormality  Etiology- Likely related to sedative meds/toxic metabolic encephalopathy vs MCI   (probably need neuropsych testing/MOCA as outpatient) ruled out any acute abnormality on MRI    Plan-   We can d/c aspirin, Atorvastatin   Patient would benefit neuropsych testing/MOCA as outpatient if no improvement in her word-finding difficulty at time of discharge  No other acute recommendations at this time, please call for questions or concerns  SUBJECTIVE     Patient was seen and examined  No acute events overnight  She reports much brighter today  She also reports pain in her epigastric region  She says she just has mild confusion today, not completely back to her baseline  Denies any new focal weakness or numbness or slurred speech or vision changes  Pertinent Negatives include: numbness, weakness, speech or visual changes, headaches, amaurosis, diplopia, other visual changes, paralysis/weakness, numbness or tingling, involuntary movements, tremor, speech impairment     OBJECTIVE     Patient ID: Lori Velarde is a 61 y o  female  Vitals:    21 2228 21 2246 21 0637 21 0821   BP: 96/55 (!) 88/50 129/76 125/73   BP Location:  Left arm     Pulse: 68  91 79   Resp:  16  16   Temp: 98 4 °F (36 9 °C) 98 2 °F (36 8 °C) 98 8 °F (37 1 °C) 97 8 °F (36 6 °C)   TempSrc:  Oral     SpO2: 90%  94% 97%   Weight:       Height:          Temperature:   Temp (24hrs), Av 4 °F (36 9 °C), Min:97 8 °F (36 6 °C), Max:99 2 °F (37 3 °C)    Temperature: 97 8 °F (36 6 °C)      Physical Exam  Vitals signs and nursing note reviewed  Constitutional:       Appearance: Normal appearance  HENT:      Head: Normocephalic  Mouth/Throat:      Mouth: Mucous membranes are moist       Pharynx: Oropharynx is clear  Eyes:      Extraocular Movements: Extraocular movements intact  Conjunctiva/sclera: Conjunctivae normal       Pupils: Pupils are equal, round, and reactive to light  Cardiovascular:      Rate and Rhythm: Normal rate  Pulses: Normal pulses  Pulmonary:      Effort: Pulmonary effort is normal  No respiratory distress  Abdominal:      Palpations: Abdomen is soft  Musculoskeletal: Normal range of motion  Skin:     General: Skin is warm  Capillary Refill: Capillary refill takes less than 2 seconds  Neurological:      Mental Status: She is alert  Neurological Examination:     Mental Status: The patient was awake, alert, attentive, oriented to person, place, and month, year  She was unsure about the exact date  Word recall 0/3 after 5 minutes, unable to subtract 7 from 100, 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     Naming objects correctly, naming pictures correctly, no slurred speech or aphasia noted  Cranial Nerves:   I: smell Not tested   II: visual fields Full to confrontation  Pupils equal, round, reactive to light with normal accomodation  Fundus: benign fundus  III,IV,VI: extraocular muscles EOMI, slow subtle jerking movement noted horizontally while checking extraocular movements  V: masseter and pterygoid strength full  Sensation in the V1 through V3 distributions intact to pinprick and light touch bilaterally  VII: Face is symmetric with no weakness noted  VIII: Audition intact to finger rub bilaterally  IX/X: Uvula midline  Soft palate elevation symmetric  XI: Trapezius and SCM strength 5/5 B/L  XII: Tongue midline with no atrophy or fasciculations with appropriate movement  Motor Examination:   No pronator drift  Bulk: Normal  No atrophy Tone: Normal  Fasciculations: None  Deltoid Biceps Triceps WE   WF   FF IO     Right        5         5          5         5      5      5   5        Left           5        5          5          5      5     5   5                       IP        Quad   Ham     TA       Gastroc   Right      5            5          5         5                5  Left         5            5         5         5                5       Reflexes:                   Biceps Brachioradialis Triceps Patella Achilles Plantars   Right          2+            2+                  2+        1+       2+         Down   Left            2+             2+                 2+         2+       2+         Down     Clonus: None    Coordination: Patient able to perform normal finger-to-nose and heel to shin appropriately  Sensory: Normal sensation to light touch, pin prick and vibratory sensation throughout  Gait:  Romberg negative    LABORATORY DATA     Labs: I have personally reviewed pertinent reports      Results from last 7 days   Lab Units 05/13/21  0956 05/13/21  0500 05/12/21  1846 05/12/21  0459   WBC Thousand/uL  --  4 34  -- 7 90   HEMOGLOBIN g/dL 10 0* 8 8*  --  11 7   HEMATOCRIT % 31 9* 28 9*  --  37 2   PLATELETS Thousands/uL  --  228 286 345   NEUTROS PCT %  --  68  --  87*   MONOS PCT %  --  9  --  7      Results from last 7 days   Lab Units 05/13/21  0501 05/12/21  1847 05/12/21  0459   SODIUM mmol/L 141 134* 135*   POTASSIUM mmol/L 3 9 3 5 3 8   CHLORIDE mmol/L 109* 103 102   CO2 mmol/L 28 27 25   BUN mg/dL 6 5 5   CREATININE mg/dL 0 47* 0 54* 0 46*   CALCIUM mg/dL 8 4 8 2* 8 6              Results from last 7 days   Lab Units 05/11/21  0925   INR  0 86   PTT seconds 27         Results from last 7 days   Lab Units 05/12/21  2354 05/12/21  2056 05/12/21  1846   TROPONIN I ng/mL <0 02 <0 02 <0 02       IMAGING & DIAGNOSTIC TESTING     Radiology Results: I have personally reviewed pertinent reports  MRI brain wo contrast   Final Result by [facility], DO (05/13 1936)      White matter changes suggestive of chronic microangiopathy  No acute intracranial pathology  Workstation performed: [id number]         CT stroke alert brain   Final Result by Vivek Riggins MD (05/12 1836)      No acute intracranial abnormality  Microangiopathic changes  Findings were directly discussed with Kieran Peterson on 5/12/2021 6:33 PM       Workstation performed: [id number]         CTA stroke alert (head/neck)   Final Result by Vivek Riggins MD (05/12 2299)      No evidence of hemodynamic significant stenosis, aneurysm or dissection  Soft tissue air is identified within the deep right portion of the neck around the vasculature  This is of indeterminate etiology, correlate clinically  Consolidation in the left and right upper lobes and lower lobes is partially visualized could relate to multifocal pneumonia and/or atelectasis  Small amount of anterior left pneumomediastinum of indeterminate origin                  Findings were directly discussed with Jeanie Calixto on 5/12/2021 6:36 PM                      [employer] performed: [id number]             Other Diagnostic Testing: I have personally reviewed pertinent reports  ACTIVE MEDICATIONS     Current Facility-Administered Medications   Medication Dose Route Frequency    acetaminophen (TYLENOL) tablet 975 mg  975 mg Oral Q8H [facility]    aspirin (ECOTRIN LOW STRENGTH) EC tablet 81 mg  81 mg Oral Daily    atorvastatin (LIPITOR) tablet 40 mg  40 mg Oral QPM    heparin (porcine) subcutaneous injection 5,000 Units  5,000 Units Subcutaneous Q8H [facility]    hydrALAZINE (APRESOLINE) injection 10 mg  10 mg Intravenous Q6H PRN    HYDROmorphone (DILAUDID) injection 0 5 mg  0 5 mg Intravenous Q3H PRN    levothyroxine tablet 25 mcg  25 mcg Oral Daily    oxyCODONE (ROXICODONE) IR tablet 10 mg  10 mg Oral Q4H PRN    oxyCODONE (ROXICODONE) IR tablet 5 mg  5 mg Oral Q4H PRN    pantoprazole (PROTONIX) EC tablet 40 mg  40 mg Oral BID AC    PARoxetine (PAXIL) tablet 60 mg  60 mg Oral Daily    promethazine (PHENERGAN) injection 12 5 mg  12 5 mg Intravenous Q6H PRN    QUEtiapine (SEROquel) tablet 400 mg  400 mg Oral HS    sucralfate (CARAFATE) tablet 1 g  1 g Oral 4x Daily (AC & HS)     Facility-Administered Medications Ordered in Other Encounters   Medication Dose Route Frequency    mineral oil liquid 15 mL  15 mL Oral Once       Prior to Admission medications    Medication Sig Start Date End Date Taking?  Authorizing Provider   famotidine (PEPCID) 40 MG tablet Take 1 tablet (40 mg total) by mouth daily at bedtime 3/19/21  Yes ABENA Will   ferrous sulfate 325 (65 Fe) mg tablet Take 1 tablet (325 mg total) by mouth 2 (two) times a day with meals 11/23/20  Yes Meaghan Cross PA-C   levothyroxine 25 mcg tablet Take 1 tablet (25 mcg total) by mouth daily 4/29/20  Yes Michelle Heart MD   LORazepam (ATIVAN) 2 mg tablet Take 1 tablet (2 mg total) by mouth every 6 (six) hours as needed for anxiety 4/16/21  Yes Michelle Heart MD   Multiple Vitamin (MULTIVITAMIN) capsule Take 1 capsule by mouth daily 11/14/19  Yes Abdoul Cote PA-C   omeprazole (PriLOSEC) 40 MG capsule Take 1 capsule (40 mg total) by mouth 2 (two) times a day 1/8/21 5/6/21 Yes ABENA Pinzon   ondansetron (ZOFRAN-ODT) 4 mg disintegrating tablet Take 1 tablet (4 mg total) by mouth every 6 (six) hours as needed for nausea or vomiting 4/26/21  Yes Marielle Adan MD   PARoxetine (PAXIL) 30 mg tablet Take 2 tablets (60 mg total) by mouth daily 12/18/20  Yes Marielle Adan MD   QUEtiapine (SEROquel) 400 MG tablet TAKE 1 TABLET (400 MG TOTAL) BY MOUTH DAILY AT BEDTIME 2/11/21  Yes Marielle Adan MD   sucralfate (CARAFATE) 1 g/10 mL suspension Take 10 mL (1 g total) by mouth 4 (four) times a day 2/24/21 5/6/21 Yes Arthor Homans, MD   ziprasidone (GEODON) 80 mg capsule TAKE 1 CAPSULE BY MOUTH EVERY DAY 3/3/21  Yes Marielle Adan MD   meclizine (ANTIVERT) 25 mg tablet Take 1 tablet (25 mg total) by mouth 3 (three) times a day as needed for dizziness 3/30/20   Marielle Adan MD   triamcinolone (KENALOG) 0 1 % cream Apply 1 application topically 2 (two) times a day To affected area 4/12/21   Marielle Adan MD         VTE Pharmacologic Prophylaxis: Heparin  VTE Mechanical Prophylaxis: sequential compression device    ==  MD Rosa Ruiz's Neurology Residency, PGY-2

## 2021-05-14 NOTE — DISCHARGE SUMMARY
Discharge Summary - Nidia Grider 61 y o  female MRN: [id number]    Unit/Bed#: [facility] 805-01 Encounter: [id number]    Admission Date:   Admission Orders (From admission, onward)     Ordered        05/13/21 0907  Inpatient Admission  Once         05/11/21 1459  Inpatient Admission  Once                     Admitting Diagnosis: Esophagitis [K20 90]    HPI: per Dr Sammy Moritz:  "80-year-old female presents back to clinic today to discuss her findings on upper GI  She otherwise feels well and has had no change in her symptomatology since our last visit  Her upper GI revealed a small hiatal hernia and evidence of reflux  Her symptoms continue to involve some persistent heartburn, as well as epigastric abdominal pain and occasional dysphagia to solids  She is tolerating a regular diet for the most part and is maintained on a PPI and Carafate  She underwent testing with the GERD HRQL questionnaire today and scored 43, and is overall dissatisfied with her current condition "    Procedures Performed: No orders of the defined types were placed in this encounter  Summary of Hospital Course: 61year old female who had a Upper GI revealing small hiatal hernia and evidence of reflux  Tolerating a regular diet and PPI and Carafate  Doing well and ready for discharge home  Will follow up with PCP and GI  For more details, please refer to medical records  Significant Findings, Care, Treatment and Services Provided: Xr Chest 1 View Portable    Result Date: 4/22/2021  Impression: No acute cardiopulmonary disease  Workstation performed: [id number]       Complications: none    Discharge Diagnosis: Pancreatitis  5/11 - Lap, converted to open cholecystectomy    Resolved Problems  Date Reviewed: 5/11/2021    None          Condition at Discharge: good         Discharge instructions/Information to patient and family:   See after visit summary for information provided to patient and family        Provisions for Follow-Up Care:  See after visit summary for information related to follow-up care and any pertinent home health orders  PCP: Usman Mathews MD    Disposition: Home    Planned Readmission: No      Discharge Statement   I spent 30 minutes discharging the patient  This time was spent on the day of discharge  I had direct contact with the patient on the day of discharge  Additional documentation is required if more than 30 minutes were spent on discharge  Discharge Medications:  See after visit summary for reconciled discharge medications provided to patient and family

## 2021-05-14 NOTE — PROGRESS NOTES
Progress Note - General Surgery   Ping Liu 61 y o  female MRN: [id number]  Unit/Bed#: [facility] 805-01 Encounter: [id number]    Assessment:  60 yo F with PMH of GERD, esophagitis, and small hiatal hernia, now s/p laparoscopic hiatal hernia repair and transoral incisionless fundoplication on 0/31     AVSS on RA     Plan:  Continue clears  PRN analgesia, avoid robaxin  PRN antiemetics  PRN anti-hypertensives  Carafate/protonix  PT/OT  DVT PPX  DC today    Subjective/Objective     Subjective:   Doing well, tolerating CLD, pain controlled    Objective:    Blood pressure (!) 88/50, pulse 68, temperature 98 2 °F (36 8 °C), temperature source Oral, resp  rate 16, height 5' 3" (1 6 m), weight 72 6 kg (160 lb), SpO2 90 %, not currently breastfeeding  ,Body mass index is 28 34 kg/m²        Intake/Output Summary (Last 24 hours) at 5/14/2021 0625  Last data filed at 5/14/2021 0027  Gross per 24 hour   Intake 1721 18 ml   Output 2800 ml   Net -1078 82 ml       Invasive Devices     Peripheral Intravenous Line            Peripheral IV 05/11/21 Left Wrist 2 days    Peripheral IV 05/11/21 Right Hand 2 days    Peripheral IV 05/12/21 Left Antecubital 1 day                Physical Exam:   Gen:  NAD  CV:  warm, well-perfused  Lungs: nl effort  Abd:  soft, appropriate T/ND, incision cdi  Ext:  no CCE  Neuro: A&Ox3     Results from last 7 days   Lab Units 05/13/21  0956 05/13/21  0500 05/12/21 1846 05/12/21  0459   WBC Thousand/uL  --  4 34  --  7 90   HEMOGLOBIN g/dL 10 0* 8 8*  --  11 7   HEMATOCRIT % 31 9* 28 9*  --  37 2   PLATELETS Thousands/uL  --  228 286 345     Results from last 7 days   Lab Units 05/13/21  0501 05/12/21 1847 05/12/21  0459   POTASSIUM mmol/L 3 9 3 5 3 8   CHLORIDE mmol/L 109* 103 102   CO2 mmol/L 28 27 25   BUN mg/dL 6 5 5   CREATININE mg/dL 0 47* 0 54* 0 46*   CALCIUM mg/dL 8 4 8 2* 8 6     Results from last 7 days   Lab Units 05/11/21  0925   INR  0 86   PTT seconds 27

## 2021-05-14 NOTE — PROGRESS NOTES
Pt discharged  All d/c instructions reviewed  Pt to  Rx in [address]    D/anshul saline locks with cath intact

## 2021-05-14 NOTE — CASE MANAGEMENT
CM called [facility]ory Choe to complete intake for a wellness check on Pt's mother Mely Shultz confirmed that a case is currently open with family  CM confirms mother takes care of Pt Sebsatian Jamargustavo in the home  CM spoke with Pt's mother Cheyenne who voiced concerns in the home about food insecurity and being unable to care for Toshia Portillo and her medical issues  CM stated that Waiver Services can assist the Pt and mother in the home with helping take the burden off of mother for care  Pt's mother declined services at this time  Pt's mother also stated there was no food in the home to eat, which concerned CM to make a Wellness Report  CM will call Aging at [facility] to confirm if [address] can be completed  CM called and completed intake with Pavithra Alcaraz    stated the Pt had recently also declined Meals on Wheels services  CM would like Aging to complete a wellness check with the Pt regardless  CM confirmed Aging will follow up and will document intake completed  CM will continue to follow the Pt until DC later today  Pt is medically cleared to DC

## 2021-05-14 NOTE — QUICK NOTE
Nurse-Patient-Provider rounds were completed with the patient's nurse today, Janessa Bowen  We discussed the plan is to discharge home    We reviewed all of the invasive devices/lines/telemetry orders   - None  DVT Prophylaxis:  Venodynes, sq heparin    Pain Assessment / Plan:  - Continue current analgesic regimen  Mobility Assessment / Plan:  - Activity as tolerated  Goals / Barriers for discharge:  none  Case management following; case and discharge needs discussed  All questions and concerns were addressed  I spent greater than 10 minutes reviewing the plan with the patient and the nurse, and coordinating care for the day      ABENA Flowers  5/14/2021

## 2021-05-16 ENCOUNTER — NURSE TRIAGE (OUTPATIENT)
Dept: OTHER | Facility: OTHER | Age: 61
End: 2021-05-16

## 2021-05-16 NOTE — TELEPHONE ENCOUNTER
TC to on-call, "Pt's mom calling due to confusion and change in behavior  Mom assists with her care  Patient spilled her pill case and took some morning medications incorrectly  Mom is asking for a home care nurse  I explained that would be handled as a referral through the office, but I would be more than happy to review a medication list and speak with lilliana to help her now  Candace Chang refused to speak with me  I instructed mom to call 911 on daughters behalf if she was concerned about behavior or safety and moms response was speaking in a loud tone and then hung up on me  Is there anything else I can offer to help?"    Per provider for any confusion or change in behavior, recommends ED evlauation to rule out infection  Pt was also d/c home on 5/14 following hernia Sx  Made 3 attempts to speak with patient/mother after provider conversation  Left 2 VM for Mother, Patient answered and then hung up the phone after I stated who I was  Last VM stated "as per previous discussion please call 911 for emergency or immediate concerns all other information would be forwarded to the office  Please call back if necessary "     Reason for Disposition   Very strange or paranoid behavior    Answer Assessment - Initial Assessment Questions  1  LEVEL OF CONSCIOUSNESS: "How is he (she, the patient) acting right now?" (e g , alert-oriented, confused, lethargic, stuporous, comatose)     Per mom, patient is acting confused  Pt refused assessment      Protocols used: CONFUSION - DELIRIUM-ADULT-

## 2021-05-16 NOTE — TELEPHONE ENCOUNTER
Regarding: seeking help with medication management   ----- Message from Maykel Beck sent at 5/16/2021  9:14 AM EDT -----  "My daughter is acting incoherent and does not know what medications to take, we need a nurse to help us with medication management "

## 2021-05-17 ENCOUNTER — TELEMEDICINE (OUTPATIENT)
Dept: INTERNAL MEDICINE CLINIC | Facility: CLINIC | Age: 61
End: 2021-05-17
Payer: COMMERCIAL

## 2021-05-17 DIAGNOSIS — K21.00 HIATAL HERNIA WITH GASTROESOPHAGEAL REFLUX DISEASE AND ESOPHAGITIS: Primary | ICD-10-CM

## 2021-05-17 DIAGNOSIS — F25.1 SCHIZOAFFECTIVE DISORDER, DEPRESSIVE TYPE (HCC): ICD-10-CM

## 2021-05-17 DIAGNOSIS — Z59.9 PROBLEM RELATED TO LIVING ARRANGEMENT: ICD-10-CM

## 2021-05-17 DIAGNOSIS — K44.9 HIATAL HERNIA WITH GASTROESOPHAGEAL REFLUX DISEASE AND ESOPHAGITIS: Primary | ICD-10-CM

## 2021-05-17 PROCEDURE — 1111F DSCHRG MED/CURRENT MED MERGE: CPT | Performed by: INTERNAL MEDICINE

## 2021-05-17 PROCEDURE — 99495 TRANSJ CARE MGMT MOD F2F 14D: CPT | Performed by: INTERNAL MEDICINE

## 2021-05-17 SDOH — ECONOMIC STABILITY - INCOME SECURITY: PROBLEM RELATED TO HOUSING AND ECONOMIC CIRCUMSTANCES, UNSPECIFIED: Z59.9

## 2021-05-17 NOTE — PATIENT INSTRUCTIONS
Problem List Items Addressed This Visit        Digestive    Hiatal hernia with gastroesophageal reflux disease and esophagitis - Primary      Status post surgical repair, will consult [facility]NA due to patient's food and diet issues, and for any wound care         Relevant Orders    Ambulatory Referral to  [facility]       Other    Schizoaffective disorder, depressive type [facility])    Relevant Orders    Ambulatory Referral to  [facility]    Problem related to living arrangement    Relevant Orders    Ambulatory Referral to  [facility]

## 2021-05-17 NOTE — PROGRESS NOTES
Assessment/Plan:        Problem List Items Addressed This Visit        Digestive    Hiatal hernia with gastroesophageal reflux disease and esophagitis - Primary      Status post surgical repair, will consult [facility] due to patient's food and diet issues, and for any wound care         Relevant Orders    Ambulatory Referral to Home Health       Other    Schizoaffective disorder, depressive type [facility])    Relevant Orders    Ambulatory Referral to [address] [place] [address] [facility]    Problem related to living arrangement    Relevant Orders    Ambulatory Referral to [address] [place] [address] [facility]             Reason for visit is TCM    Encounter provider Marielle Adan MD       Provider located at [address] [place] [id number]      Recent Visits  No visits were found meeting these conditions  Showing recent visits within past 7 days and meeting all other requirements     Today's Visits  Date Type Provider Dept   05/17/21 Telemedicine Marielle Adan MD [facility] today's visits and meeting all other requirements     Future Appointments  No visits were found meeting these conditions  Showing future appointments within next 150 days and meeting all other requirements        After connecting through [employer], the patient was identified by name and date of birth  Maryam Lopez was informed that this is a telemedicine visit and that the visit is being conducted through [address] [place] [address] Now and patient was informed that this is a secure, HIPAA-compliant platform  She agrees to proceed     My office door was closed  No one else was in the room  She acknowledged consent and understanding of privacy and security of the video platform  The patient has agreed to participate and understands they can discontinue the visit at any time  Patient is aware this is a billable service  Subjective:     Patient ID: Maryam Lopez is a 61 y o  female      Patient's hospitalization for laparoscopic paraesophageal hernia repair  Her hospital course was complicated by being confused difficulty getting words out and having word-finding problems, stroke alert was called and imaging was done which was okay  No word finding symptoms since she was home  She reports the incisions from her surgery look ago, no redness or drainage, no fevers, no chills  No chest pain, no SOB, no nausea, no vomit, no constipation or diarrhea  Pt states "I'm really hungry and I need food and a nurse "   Patient normally does her shopping, but she is unable do that at this time  She reports she has no food in the house to eat  She wants help with the wound care also  She was instructed to follow a liquid diet with pudding and liquids  Review of Systems   Constitutional: Negative for chills, fatigue and fever  HENT: Negative for congestion, nosebleeds, postnasal drip, sore throat and trouble swallowing  Eyes: Negative for pain  Respiratory: Negative for cough, chest tightness, shortness of breath and wheezing  Cardiovascular: Negative for chest pain, palpitations and leg swelling  Gastrointestinal: Negative for abdominal pain, constipation, diarrhea, nausea and vomiting  Endocrine: Negative for polydipsia and polyuria  Genitourinary: Negative for dysuria, flank pain and hematuria  Musculoskeletal: Negative for arthralgias  Skin: Negative for rash  Neurological: Negative for dizziness, tremors and headaches  Hematological: Does not bruise/bleed easily  Psychiatric/Behavioral: Negative for confusion and dysphoric mood  The patient is not nervous/anxious  Objective:    Vitals:       Physical Exam    It was my intent to perform this visit via video technology but the patient was not able to do a video connection so the visit was completed via audio telephone only  Transitional Care Management Review:  Sharyle June is a 61 y o  female here for TCM follow up       During the TCM phone call patient stated:    TCM Call (since 4/16/2021)     Date and time call was made  5/14/2021  2:56 PM    Hospital care reviewed  Records not available    [facility]        Date of Admission  05/11/21    Date of discharge  05/14/21    Diagnosis  Gastroesophageal reflux disease with esophagitis without hemorrhage    Disposition  Home    Were the patients medications reviewed and updated  No    Current Symptoms  Upper abdominal pain; Middle abdominal pain; Lower abdominal pain      TCM Call (since 4/16/2021)     Should patient be enrolled in anticoag monitoring? No    Scheduled for follow up? Yes    Did you obtain your prescribed medications  Yes    Do you need help managing your prescriptions or medications  No    Is transportation to your appointment needed  No    I have advised the patient to call PCP with any new or worsening symptoms  [address]    Are you recieving any outpatient services  No    Are you recieving home care services  No    Have you fallen in the last 12 months  No    Interperter language line needed  No        It was my intent to perform this visit via video technology but the patient was not able to do a video connection so the visit was completed via audio telephone only  Converted to phone visit  I spent 20 minutes with the patient during this visit      Deonna Levin MD

## 2021-05-17 NOTE — ASSESSMENT & PLAN NOTE
Status post surgical repair, will consult [facility] due to patient's food and diet issues, and for any wound care

## 2021-05-18 ENCOUNTER — DOCUMENTATION (OUTPATIENT)
Dept: SOCIAL WORK | Facility: HOSPITAL | Age: 61
End: 2021-05-18

## 2021-05-18 ENCOUNTER — TELEPHONE (OUTPATIENT)
Dept: GASTROENTEROLOGY | Facility: CLINIC | Age: 61
End: 2021-05-18

## 2021-05-18 ENCOUNTER — PATIENT OUTREACH (OUTPATIENT)
Dept: INTERNAL MEDICINE CLINIC | Facility: CLINIC | Age: 61
End: 2021-05-18

## 2021-05-18 DIAGNOSIS — K22.10 EROSIVE ESOPHAGITIS: Primary | ICD-10-CM

## 2021-05-18 RX ORDER — PANTOPRAZOLE SODIUM 40 MG/1
40 TABLET, DELAYED RELEASE ORAL 2 TIMES DAILY
Qty: 60 TABLET | Refills: 0 | Status: SHIPPED | OUTPATIENT
Start: 2021-05-18 | End: 2021-06-17 | Stop reason: SDUPTHER

## 2021-05-18 NOTE — TELEPHONE ENCOUNTER
----- Message from Kristel Syed MD sent at 5/18/2021  2:53 PM EDT -----   Please call patient and schedule office visit in 2-3 weeks, status post hiatal hernia repair along with the tif  last week

## 2021-05-18 NOTE — PROGRESS NOTES
Referral received from PCP office with request for [facility] to outreach patient and assess needs; patient informed PCP that she does not have any food in her home or a way to get to grocery store  Patient lives in home with her elderly mother  PCP placed [facility] referral for RN to assist with wound care needs  Per chart review, IPCM previously discussed Waiver and placed referral to [employer] to conduct wellness check to patient's home  Meals on Wheels were offered but patient/patient's mother declined  OPCM SW attempted to outreach patient to further assess needs  Home phone # went directly to mailbox; voicemail left with contact information  Will await return call  [facility] to follow-up on recent requested Wellness Check  Spoke with [place] [facility] who stated report was placed for patient's mother; not patient  [facility] [facility] transferred [facility] to assigned   Voicemail left for  requesting return call to discuss any services that have been started for patient's household  Update routed to Dr Bernardo Green

## 2021-05-18 NOTE — TELEPHONE ENCOUNTER
I called pt to try to schedule 2-3wk f/u to TIF, however, her voice mail box was full  Will call pt again tomorrow to see if I can schedule her for appt

## 2021-05-18 NOTE — PROGRESS NOTES
Admission Report at [facility] has admitted your patient to [address] [place] [address] service with the following disciplines:      SN and MSW  Response needed, please respond via Tiger text  Primary focus of home health care: GI assess  Patient stated goals of care: Get medications straight  Anticipated visit pattern: 3w1 2w2 1w2  and next visit date: 5 19 21 GI assess medication managment  Significant clinical findings: Dizziness with standing lasting few minutes after standing  Patient reports unable to drive due to dizziness  Patient reports depression and anxiety all the time  Patient is arguing alot with mother since surgery being in bed alot  Patient unable to care properly for mother  Mother says patient not able to care for mother prior to surgery  Request for additional disciplines: Please tiger text verbal order for [facility] home health aide 2 week 4 for bathing and personal care  MED ISSUES_Patient is missing protonix 40 mg daily needs script called into [facility]  Patient stoped taking asirin 81 mg, iron 325mg, levothyroxine 25 mcg, meclizine 25 mg,  quetiapine 400 mg, lorazapam bottle empty patient reports stopped taking prior to surgery, ziprasidone  Reports no need for oxycodone  Patient reports only been taking multivitamin and Sucralfate since surgery  Needs follow up physician appointments scheduled: PCP  Potential barriers to goal achievement: Patient does not have an ICM,  Intensive , and is not connected with any behavioral health treatment  Offered food delivery services patient and mother refused  Offered meals on wheels and patient and mother refused  Only wants food bank food  Patient does not have an ICM,  Intensive , and is not connected with any behavioral health treatment  Offered food delivery services patient and mother refused  Offered meals on wheels and patient and mother refused  Only wants food bank food     Other pertinent information: TC to Dr Ema Blunt obtained VO via Rebekah Boo to dc full liquid diet and resume soft diet as stated on AVS 5 14 21  TC to Dr Ian Wang obtained VO for [facility] MSW via [facility]  Thank you for allowing us to participate in the care of your patient        Jonny Arthur RN

## 2021-05-18 NOTE — UTILIZATION REVIEW
Notification of Discharge   This is a Notification of Discharge from our facility [address]  Please be advised that this patient has been discharge from our facility  Below you will find the admission and discharge date and time including the patients disposition  UTILIZATION REVIEW CONTACT:  Arias Becerra  Utilization   Network Utilization Review Department  Phone: [phone] x carefully listen to the prompts  All voicemails are confidential   Email: [email or web address]     PHYSICIAN ADVISORY SERVICES:  FOR [id number] REVIEW - MEDICAL NECESSITY DENIAL  Phone: [phone]  Fax: [phone]  Email: [email or web address]     PRESENTATION DATE: 5/11/2021  8:09 AM  OBERVATION ADMISSION DATE:   INPATIENT ADMISSION DATE: 5/13/21 0907   DISCHARGE DATE: 5/14/2021 12:09 PM  DISPOSITION: Home/Self Care Home/Self Care      IMPORTANT INFORMATION:  Send all requests for admission clinical reviews, approved or denied determinations and any other requests to dedicated fax number below belonging to the campus where the patient is receiving treatment   List of dedicated fax numbers:  [address] DENIALS (Administrative/Medical Necessity) [phone]   1000  [facility] (Maternity/NICU/Pediatrics) [phone]   Saul Leahy [phone]   Particia Notice [phone]   Raudel Mendoza [phone]   Kevan Barroso [facility] [address] [facility] [phone]   [facility]  [phone]   [address] [facility]  [address] [facility] [address] [facility] [phone]

## 2021-05-19 ENCOUNTER — TELEMEDICINE (OUTPATIENT)
Dept: INTERNAL MEDICINE CLINIC | Facility: CLINIC | Age: 61
End: 2021-05-19
Payer: COMMERCIAL

## 2021-05-19 VITALS — TEMPERATURE: 96.5 F

## 2021-05-19 DIAGNOSIS — E03.9 ACQUIRED HYPOTHYROIDISM: ICD-10-CM

## 2021-05-19 DIAGNOSIS — K21.00 HIATAL HERNIA WITH GASTROESOPHAGEAL REFLUX DISEASE AND ESOPHAGITIS: Primary | ICD-10-CM

## 2021-05-19 DIAGNOSIS — F25.1 SCHIZOAFFECTIVE DISORDER, DEPRESSIVE TYPE (HCC): ICD-10-CM

## 2021-05-19 DIAGNOSIS — K44.9 HIATAL HERNIA WITH GASTROESOPHAGEAL REFLUX DISEASE AND ESOPHAGITIS: Primary | ICD-10-CM

## 2021-05-19 DIAGNOSIS — F41.1 GAD (GENERALIZED ANXIETY DISORDER): Chronic | ICD-10-CM

## 2021-05-19 PROCEDURE — 99214 OFFICE O/P EST MOD 30 MIN: CPT | Performed by: INTERNAL MEDICINE

## 2021-05-19 PROCEDURE — 1111F DSCHRG MED/CURRENT MED MERGE: CPT | Performed by: INTERNAL MEDICINE

## 2021-05-19 RX ORDER — ZIPRASIDONE HYDROCHLORIDE 80 MG/1
80 CAPSULE ORAL DAILY
Qty: 90 CAPSULE | Refills: 1 | Status: SHIPPED | OUTPATIENT
Start: 2021-05-19 | End: 2021-05-19 | Stop reason: SDUPTHER

## 2021-05-19 RX ORDER — QUETIAPINE FUMARATE 400 MG/1
400 TABLET, FILM COATED ORAL
Qty: 90 TABLET | Refills: 3 | Status: SHIPPED | OUTPATIENT
Start: 2021-05-19 | End: 2021-05-19 | Stop reason: SDUPTHER

## 2021-05-19 RX ORDER — ZIPRASIDONE HYDROCHLORIDE 80 MG/1
80 CAPSULE ORAL DAILY
Qty: 90 CAPSULE | Refills: 1 | Status: SHIPPED | OUTPATIENT
Start: 2021-05-19 | End: 2021-11-08

## 2021-05-19 RX ORDER — LEVOTHYROXINE SODIUM 0.03 MG/1
25 TABLET ORAL DAILY
Qty: 90 TABLET | Refills: 3 | Status: SHIPPED | OUTPATIENT
Start: 2021-05-19 | End: 2021-05-19 | Stop reason: SDUPTHER

## 2021-05-19 RX ORDER — QUETIAPINE FUMARATE 400 MG/1
400 TABLET, FILM COATED ORAL
Qty: 90 TABLET | Refills: 3 | Status: SHIPPED | OUTPATIENT
Start: 2021-05-19 | End: 2021-11-16

## 2021-05-19 RX ORDER — LEVOTHYROXINE SODIUM 0.03 MG/1
25 TABLET ORAL DAILY
Qty: 90 TABLET | Refills: 3 | Status: SHIPPED | OUTPATIENT
Start: 2021-05-19 | End: 2022-03-18

## 2021-05-19 NOTE — PROGRESS NOTES
Virtual Regular Visit      Assessment/Plan:    Problem List Items Addressed This Visit        Digestive    Hiatal hernia with gastroesophageal reflux disease and esophagitis - Primary      Status post repair, continue pantoprazole as per instructions from surgery,      I spoke with GI and GI specifically recommended that pt follow a full liquid diet for 2 weeks post op  Pureed foods are ok if tolerating  No solid foods  I also emphasized the importance of 2 week follow up with GI  I explained the necessity of the procedure based on the fact that she had such bad reflux that was not responding to medications  Patient understands instructions  Endocrine    Acquired hypothyroidism      I recommend restarting thyroid med, and this was sent to pharmacy         Relevant Medications    levothyroxine 25 mcg tablet       Other    ABIMAEL (generalized anxiety disorder) (Chronic)    Relevant Medications    ziprasidone (GEODON) 80 mg capsule    QUEtiapine (SEROquel) 400 MG tablet    Schizoaffective disorder, depressive type ([address] )     I recommend patient restart her psychiatric meds including Geodon 80 milligrams daily, and Seroquel 400 milligrams daily  These were sent to the pharmacy  VNA and social work were also consulted for patient already as she is having difficulty managing home     Reassurance given, I also recommended she follow-up with her psychiatrist as they manage her psych medications         Relevant Medications    ziprasidone (GEODON) 80 mg capsule    QUEtiapine (SEROquel) 400 MG tablet               Reason for visit is   Chief Complaint   Patient presents with    Virtual Regular Visit        Encounter provider Josesito Garcia MD    Provider located at [address] [place] [id number]      Recent Visits  Date Type Provider Dept   05/17/21 Telemedicine Josesito Garcia MD [facility] [facility] recent visits within past 7 days and meeting all other requirements     Today's Visits  Date Type Provider Dept   05/19/21 Telemedicine Tye Yeung Jeremy today's visits and meeting all other requirements     Future Appointments  No visits were found meeting these conditions  Showing future appointments within next 150 days and meeting all other requirements        The patient was identified by name and date of birth  Betzaida Gomes was informed that this is a telemedicine visit and that the visit is being conducted through [address] [place] [address] [facility] [employer] and patient was informed that this is a secure, HIPAA-compliant platform  She agrees to proceed     My office door was closed  No one else was in the room  She acknowledged consent and understanding of privacy and security of the video platform  The patient has agreed to participate and understands they can discontinue the visit at any time  Patient is aware this is a billable service  Subjective  Betzaida Gomes is a 61 y o  female    Pt reports needing me to "help save her life "  She reports having confusion which she attributes to the surgery  She reports she is not taking any of her medications  She has not seen psych since her surgery         Past Medical History:   Diagnosis Date    Anxiety     Arthritis     Back pain at L4-L5 level     Schreiber esophagus     Bulimia     Colon polyp     Disease of thyroid gland     hypothyroid    Ear problems     GERD (gastroesophageal reflux disease)     History of transfusion     Hyperlipidemia     Hypothyroidism     Leukopenia     Nasal congestion     NMS (neuroleptic malignant syndrome) 01/03/2020    Pneumonia     Rheumatoid arthritis involving right hip (HCC)     Sciatica     Seizure ([address] )     due to medication mix up 8/2018 only one historically    Seizures ([address] )     Synovial cyst of lumbar spine     Vertigo     Weight gain        Past Surgical History:   Procedure Laterality Date    BONE MARROW BIOPSY      BREAST SURGERY      enlargement     CLOSED REDUCTION DISTAL FEMUR FRACTURE      COLONOSCOPY      COSMETIC SURGERY      HIP ARTHROPLASTY Right 1/2/2020    Procedure: REVISION TOTAL HIP ARTHROPLASTY WITH REPAIR OF PARIPROSTHETIC FRACTURE - POSTERIOR APPROACH;  Surgeon: Waqar Fernandes MD;  [facility];  Service: Orthopedics    SC ESOPHAGOGASTRODUODENOSCOPY TRANSORAL DIAGNOSTIC N/A 5/11/2021    Procedure: ESOPHAGOGASTRODUODENOSCOPY (EGD); Surgeon: Dilan Spear MD;  [facility];  Service: General    SC ESOPHAGUS SURG PROCEDURE UNLISTED N/A 5/11/2021    Procedure: FUNDOPLICATION TRANSORAL INCISIONLESS;  Surgeon: Verdell Bernheim, MD;  [facility];  Service: Gastroenterology    SC LAP, REPAIR PARAESOPHAGEAL HERNIA, INCL FUNDOPLASTY W/ MESH N/A 5/11/2021    Procedure: REPAIR HERNIA PARAESOPHAGEAL  LAPAROSCOPIC;  Surgeon:  Dilan Spear MD;  [facility];  Service: General    SC TOTAL HIP ARTHROPLASTY Right 12/11/2019    Procedure: ARTHROPLASTY HIP TOTAL ANTERIOR;  Surgeon: Waqar Fernandes MD;  [facility];  Service: Orthopedics    RHINOPLASTY      SINUS SURGERY         Current Outpatient Medications   Medication Sig Dispense Refill    acetaminophen (TYLENOL) 325 mg tablet Take 3 tablets (975 mg total) by mouth every 8 (eight) hours (Patient not taking: Reported on 5/19/2021) 30 tablet 0    aspirin (ECOTRIN LOW STRENGTH) 81 mg EC tablet Take 1 tablet (81 mg total) by mouth daily (Patient not taking: Reported on 5/19/2021) 30 tablet 0    atorvastatin (LIPITOR) 40 mg tablet Take 1 tablet (40 mg total) by mouth every evening (Patient not taking: Reported on 5/19/2021) 30 tablet 0    ferrous sulfate 325 (65 Fe) mg tablet Take 1 tablet (325 mg total) by mouth 2 (two) times a day with meals (Patient not taking: Reported on 5/19/2021) 60 tablet 1    levothyroxine 25 mcg tablet Take 1 tablet (25 mcg total) by mouth daily 90 tablet 3    LORazepam (ATIVAN) 2 mg tablet Take 1 tablet (2 mg total) by mouth every 6 (six) hours as needed for anxiety (Patient not taking: Reported on 5/19/2021) 120 tablet 1    meclizine (ANTIVERT) 25 mg tablet Take 1 tablet (25 mg total) by mouth 3 (three) times a day as needed for dizziness (Patient not taking: Reported on 5/19/2021) 30 tablet 0    Multiple Vitamin (MULTIVITAMIN) capsule Take 1 capsule by mouth daily (Patient not taking: Reported on 5/19/2021) 30 capsule 0    ondansetron (ZOFRAN-ODT) 4 mg disintegrating tablet Take 1 tablet (4 mg total) by mouth every 6 (six) hours as needed for nausea or vomiting (Patient not taking: Reported on 5/19/2021) 20 tablet 0    oxyCODONE (ROXICODONE) 5 mg immediate release tablet Take 1 tablet (5 mg total) by mouth every 4 (four) hours as needed for moderate pain for up to 10 daysMax Daily Amount: 30 mg (Patient not taking: Reported on 5/19/2021) 20 tablet 0    pantoprazole (PROTONIX) 40 mg tablet Take 1 tablet (40 mg total) by mouth 2 (two) times a day before meals for 14 days (Patient not taking: Reported on 5/19/2021) 28 tablet 0    pantoprazole (PROTONIX) 40 mg tablet Take 1 tablet (40 mg total) by mouth 2 (two) times a day 60 tablet 0    PARoxetine (PAXIL) 30 mg tablet Take 2 tablets (60 mg total) by mouth daily (Patient not taking: Reported on 5/19/2021) 180 tablet 3    QUEtiapine (SEROquel) 400 MG tablet Take 1 tablet (400 mg total) by mouth daily at bedtime 90 tablet 3    sucralfate (CARAFATE) 1 g/10 mL suspension Take 10 mL (1 g total) by mouth 4 (four) times a day for 14 days (Patient not taking: Reported on 5/19/2021) 560 mL 0    triamcinolone (KENALOG) 0 1 % cream Apply 1 application topically 2 (two) times a day To affected area (Patient not taking: Reported on 5/19/2021) 80 g 5    ziprasidone (GEODON) 80 mg capsule Take 1 capsule (80 mg total) by mouth daily 90 capsule 1     No current facility-administered medications for this visit           Allergies   Allergen Reactions    Risperdal [Risperidone] Shortness Of Breath     Rapid heart beat, SOB    Zyprexa [Olanzapine] Shortness Of Breath     Rapid heartbeat    Latex Rash     Band aids, adhesives wears normal underwear, can eat bananas  USE PAPER TAPE       Review of Systems   Constitutional: Negative for chills, fatigue and fever  HENT: Negative for congestion, nosebleeds, postnasal drip, sore throat and trouble swallowing  Eyes: Negative for pain  Respiratory: Negative for cough, chest tightness, shortness of breath and wheezing  Cardiovascular: Negative for chest pain, palpitations and leg swelling  Gastrointestinal: Negative for abdominal pain, constipation, diarrhea, nausea and vomiting  Endocrine: Negative for polydipsia and polyuria  Genitourinary: Negative for dysuria, flank pain and hematuria  Musculoskeletal: Negative for arthralgias  Skin: Negative for rash  Neurological: Negative for dizziness, tremors and headaches  Hematological: Does not bruise/bleed easily  Psychiatric/Behavioral: Positive for confusion  Negative for dysphoric mood  The patient is nervous/anxious  Video Exam    Vitals:    05/19/21 0934   Temp: (!) 96 5 °F (35 8 °C)       Physical Exam   It was my intent to perform this visit via video technology but the patient was not able to do a video connection so the visit was completed via audio telephone only  Converted to phone visit  I spent 20 minutes with patient today in which greater than 50% of the time was spent in counseling/coordination of care regarding acute and chronic issues      VIRTUAL VISIT DISCLAIMER    Mirza Plascencia acknowledges that she has consented to an online visit or consultation   She understands that the online visit is based solely on information provided by her, and that, in the absence of a face-to-face physical evaluation by the physician, the diagnosis she receives is both limited and provisional in terms of accuracy and completeness  This is not intended to replace a full medical face-to-face evaluation by the physician  Nidia Grider understands and accepts these terms

## 2021-05-19 NOTE — PATIENT INSTRUCTIONS
Problem List Items Addressed This Visit        Digestive    Hiatal hernia with gastroesophageal reflux disease and esophagitis - Primary      Status post repair, continue pantoprazole as per instructions from surgery,      I spoke with GI and GI specifically recommended that pt follow a full liquid diet for 2 weeks post op  Pureed foods are ok if tolerating  No solid foods  I also emphasized the importance of 2 week follow up with GI  I explained the necessity of the procedure based on the fact that she had such bad reflux that was not responding to medications  Patient understands instructions  Endocrine    Acquired hypothyroidism      I recommend restarting thyroid med, and this was sent to pharmacy         Relevant Medications    levothyroxine 25 mcg tablet       Other    ABIMAEL (generalized anxiety disorder) (Chronic)    Relevant Medications    ziprasidone (GEODON) 80 mg capsule    QUEtiapine (SEROquel) 400 MG tablet    Schizoaffective disorder, depressive type ([facility] [address] [facility] )     I recommend patient restart her psychiatric meds including Geodon 80 milligrams daily, and Seroquel 400 milligrams daily  These were sent to the pharmacy  VNA and social work were also consulted for patient already as she is having difficulty managing home     Reassurance given, I also recommended she follow-up with her psychiatrist as they manage her psych medications         Relevant Medications    ziprasidone (GEODON) 80 mg capsule    QUEtiapine (SEROquel) 400 MG tablet

## 2021-05-19 NOTE — ASSESSMENT & PLAN NOTE
Status post repair, continue pantoprazole as per instructions from surgery,      I spoke with GI and GI specifically recommended that pt follow a full liquid diet for 2 weeks post op  Pureed foods are ok if tolerating  No solid foods  I also emphasized the importance of 2 week follow up with GI  I explained the necessity of the procedure based on the fact that she had such bad reflux that was not responding to medications  Patient understands instructions

## 2021-05-19 NOTE — PROGRESS NOTES
Voicemail received from [facility]  Tiffanie Pink [phone] stating she conducted Wellness Check for patient's mom Kimber Campbell while patient was admitted to the hospital   Tiffanie Pink also delivered meals to Kimber Campbell and stated she declined any additional resources/services through [facility]  Kimber Campbell had Meals on [employer], [employer], and Personal Care [employer] in place but since requested these services be stopped; her case was closed on 5/12  Tiffanie Pink also spoke directly with patient and provided information for Meals on Wheels; patient would need to call [facility] for intake and [facility] would call [facility] to discuss funding if needed Edouard Zhu confirmed this process was explained to patient who was understanding)  [facility] SW will await return call from patient and attempt additional outreach at later date if no return call received

## 2021-05-19 NOTE — ASSESSMENT & PLAN NOTE
I recommend patient restart her psychiatric meds including Geodon 80 milligrams daily, and Seroquel 400 milligrams daily  These were sent to the pharmacy  VNA and social work were also consulted for patient already as she is having difficulty managing home     Reassurance given, I also recommended she follow-up with her psychiatrist as they manage her psych medications

## 2021-05-20 ENCOUNTER — HOSPITAL ENCOUNTER (INPATIENT)
Facility: HOSPITAL | Age: 61
LOS: 4 days | Discharge: HOME HEALTH | DRG: 641 | End: 2021-05-24
Attending: EMERGENCY MEDICINE | Admitting: INTERNAL MEDICINE
Payer: COMMERCIAL

## 2021-05-20 ENCOUNTER — APPOINTMENT (EMERGENCY)
Dept: RADIOLOGY | Facility: HOSPITAL | Age: 61
DRG: 641 | End: 2021-05-20
Payer: COMMERCIAL

## 2021-05-20 DIAGNOSIS — F25.9 SCHIZOAFFECTIVE DISORDER, UNSPECIFIED TYPE (HCC): ICD-10-CM

## 2021-05-20 DIAGNOSIS — M80.80XD LOCALIZED OSTEOPOROSIS WITH CURRENT PATHOLOGICAL FRACTURE WITH ROUTINE HEALING: ICD-10-CM

## 2021-05-20 DIAGNOSIS — J18.9 PNEUMONIA: Primary | ICD-10-CM

## 2021-05-20 DIAGNOSIS — M54.16 LUMBAR RADICULOPATHY: ICD-10-CM

## 2021-05-20 DIAGNOSIS — M51.36 DDD (DEGENERATIVE DISC DISEASE), LUMBAR: ICD-10-CM

## 2021-05-20 DIAGNOSIS — M43.16 SPONDYLOLISTHESIS AT L4-L5 LEVEL: ICD-10-CM

## 2021-05-20 PROBLEM — A41.9 SEPSIS (HCC): Chronic | Status: ACTIVE | Noted: 2021-05-20

## 2021-05-20 PROBLEM — E78.5 HYPERLIPIDEMIA: Status: ACTIVE | Noted: 2021-05-20

## 2021-05-20 PROBLEM — A41.9 SEPSIS (HCC): Status: ACTIVE | Noted: 2021-05-20

## 2021-05-20 LAB
ALBUMIN SERPL BCP-MCNC: 3.3 G/DL (ref 3.5–5)
ALP SERPL-CCNC: 85 U/L (ref 46–116)
ALT SERPL W P-5'-P-CCNC: 36 U/L (ref 12–78)
ANION GAP SERPL CALCULATED.3IONS-SCNC: 10 MMOL/L (ref 4–13)
APTT PPP: 26 SECONDS (ref 23–37)
AST SERPL W P-5'-P-CCNC: 21 U/L (ref 5–45)
BASOPHILS # BLD AUTO: 0.04 THOUSANDS/ΜL (ref 0–0.1)
BASOPHILS NFR BLD AUTO: 1 % (ref 0–1)
BILIRUB SERPL-MCNC: 0.28 MG/DL (ref 0.2–1)
BILIRUB UR QL STRIP: NEGATIVE
BUN SERPL-MCNC: 11 MG/DL (ref 5–25)
CALCIUM ALBUM COR SERPL-MCNC: 10.6 MG/DL (ref 8.3–10.1)
CALCIUM SERPL-MCNC: 10 MG/DL (ref 8.3–10.1)
CHLORIDE SERPL-SCNC: 98 MMOL/L (ref 100–108)
CK MB SERPL-MCNC: 2.1 % (ref 0–2.5)
CK MB SERPL-MCNC: 4.5 NG/ML (ref 0–5)
CK SERPL-CCNC: 215 U/L (ref 26–192)
CLARITY UR: CLEAR
CO2 SERPL-SCNC: 27 MMOL/L (ref 21–32)
COLOR UR: YELLOW
CREAT SERPL-MCNC: 0.79 MG/DL (ref 0.6–1.3)
EOSINOPHIL # BLD AUTO: 0.06 THOUSAND/ΜL (ref 0–0.61)
EOSINOPHIL NFR BLD AUTO: 1 % (ref 0–6)
ERYTHROCYTE [DISTWIDTH] IN BLOOD BY AUTOMATED COUNT: 14.4 % (ref 11.6–15.1)
GFR SERPL CREATININE-BSD FRML MDRD: 82 ML/MIN/1.73SQ M
GLUCOSE SERPL-MCNC: 123 MG/DL (ref 65–140)
GLUCOSE UR STRIP-MCNC: NEGATIVE MG/DL
HCT VFR BLD AUTO: 33.1 % (ref 34.8–46.1)
HGB BLD-MCNC: 10.9 G/DL (ref 11.5–15.4)
HGB UR QL STRIP.AUTO: NEGATIVE
IMM GRANULOCYTES # BLD AUTO: 0.03 THOUSAND/UL (ref 0–0.2)
IMM GRANULOCYTES NFR BLD AUTO: 0 % (ref 0–2)
INR PPP: 0.98 (ref 0.84–1.19)
KETONES UR STRIP-MCNC: NEGATIVE MG/DL
LACTATE SERPL-SCNC: 1.9 MMOL/L (ref 0.5–2)
LACTATE SERPL-SCNC: 3.4 MMOL/L (ref 0.5–2)
LEUKOCYTE ESTERASE UR QL STRIP: NEGATIVE
LYMPHOCYTES # BLD AUTO: 0.75 THOUSANDS/ΜL (ref 0.6–4.47)
LYMPHOCYTES NFR BLD AUTO: 9 % (ref 14–44)
MAGNESIUM SERPL-MCNC: 1.5 MG/DL (ref 1.6–2.6)
MCH RBC QN AUTO: 28.9 PG (ref 26.8–34.3)
MCHC RBC AUTO-ENTMCNC: 32.9 G/DL (ref 31.4–37.4)
MCV RBC AUTO: 88 FL (ref 82–98)
MONOCYTES # BLD AUTO: 0.54 THOUSAND/ΜL (ref 0.17–1.22)
MONOCYTES NFR BLD AUTO: 7 % (ref 4–12)
NEUTROPHILS # BLD AUTO: 6.54 THOUSANDS/ΜL (ref 1.85–7.62)
NEUTS SEG NFR BLD AUTO: 82 % (ref 43–75)
NITRITE UR QL STRIP: NEGATIVE
NRBC BLD AUTO-RTO: 0 /100 WBCS
PH UR STRIP.AUTO: 5.5 [PH]
PHOSPHATE SERPL-MCNC: 1.8 MG/DL (ref 2.3–4.1)
PLATELET # BLD AUTO: 367 THOUSANDS/UL (ref 149–390)
PMV BLD AUTO: 9.1 FL (ref 8.9–12.7)
POTASSIUM SERPL-SCNC: 3 MMOL/L (ref 3.5–5.3)
PROCALCITONIN SERPL-MCNC: 0.08 NG/ML
PROT SERPL-MCNC: 6.5 G/DL (ref 6.4–8.2)
PROT UR STRIP-MCNC: NEGATIVE MG/DL
PROTHROMBIN TIME: 13 SECONDS (ref 11.6–14.5)
RBC # BLD AUTO: 3.77 MILLION/UL (ref 3.81–5.12)
SARS-COV-2 RNA RESP QL NAA+PROBE: NEGATIVE
SODIUM SERPL-SCNC: 135 MMOL/L (ref 136–145)
SP GR UR STRIP.AUTO: 1.01 (ref 1–1.03)
TROPONIN I SERPL-MCNC: <0.02 NG/ML
UROBILINOGEN UR QL STRIP.AUTO: 0.2 E.U./DL
WBC # BLD AUTO: 7.96 THOUSAND/UL (ref 4.31–10.16)

## 2021-05-20 PROCEDURE — 87040 BLOOD CULTURE FOR BACTERIA: CPT | Performed by: EMERGENCY MEDICINE

## 2021-05-20 PROCEDURE — 85730 THROMBOPLASTIN TIME PARTIAL: CPT | Performed by: EMERGENCY MEDICINE

## 2021-05-20 PROCEDURE — 84484 ASSAY OF TROPONIN QUANT: CPT | Performed by: EMERGENCY MEDICINE

## 2021-05-20 PROCEDURE — 83605 ASSAY OF LACTIC ACID: CPT | Performed by: EMERGENCY MEDICINE

## 2021-05-20 PROCEDURE — 85610 PROTHROMBIN TIME: CPT | Performed by: EMERGENCY MEDICINE

## 2021-05-20 PROCEDURE — 36415 COLL VENOUS BLD VENIPUNCTURE: CPT | Performed by: EMERGENCY MEDICINE

## 2021-05-20 PROCEDURE — 96361 HYDRATE IV INFUSION ADD-ON: CPT

## 2021-05-20 PROCEDURE — 80053 COMPREHEN METABOLIC PANEL: CPT | Performed by: EMERGENCY MEDICINE

## 2021-05-20 PROCEDURE — 96360 HYDRATION IV INFUSION INIT: CPT

## 2021-05-20 PROCEDURE — G1004 CDSM NDSC: HCPCS

## 2021-05-20 PROCEDURE — 99223 1ST HOSP IP/OBS HIGH 75: CPT | Performed by: INTERNAL MEDICINE

## 2021-05-20 PROCEDURE — 93005 ELECTROCARDIOGRAM TRACING: CPT

## 2021-05-20 PROCEDURE — 74177 CT ABD & PELVIS W/CONTRAST: CPT

## 2021-05-20 PROCEDURE — 83735 ASSAY OF MAGNESIUM: CPT | Performed by: INTERNAL MEDICINE

## 2021-05-20 PROCEDURE — U0005 INFEC AGEN DETEC AMPLI PROBE: HCPCS | Performed by: EMERGENCY MEDICINE

## 2021-05-20 PROCEDURE — 71275 CT ANGIOGRAPHY CHEST: CPT

## 2021-05-20 PROCEDURE — 99285 EMERGENCY DEPT VISIT HI MDM: CPT | Performed by: EMERGENCY MEDICINE

## 2021-05-20 PROCEDURE — 96365 THER/PROPH/DIAG IV INF INIT: CPT

## 2021-05-20 PROCEDURE — 84100 ASSAY OF PHOSPHORUS: CPT | Performed by: INTERNAL MEDICINE

## 2021-05-20 PROCEDURE — U0003 INFECTIOUS AGENT DETECTION BY NUCLEIC ACID (DNA OR RNA); SEVERE ACUTE RESPIRATORY SYNDROME CORONAVIRUS 2 (SARS-COV-2) (CORONAVIRUS DISEASE [COVID-19]), AMPLIFIED PROBE TECHNIQUE, MAKING USE OF HIGH THROUGHPUT TECHNOLOGIES AS DESCRIBED BY CMS-2020-01-R: HCPCS | Performed by: EMERGENCY MEDICINE

## 2021-05-20 PROCEDURE — 84145 PROCALCITONIN (PCT): CPT | Performed by: EMERGENCY MEDICINE

## 2021-05-20 PROCEDURE — 82550 ASSAY OF CK (CPK): CPT | Performed by: INTERNAL MEDICINE

## 2021-05-20 PROCEDURE — 81003 URINALYSIS AUTO W/O SCOPE: CPT | Performed by: EMERGENCY MEDICINE

## 2021-05-20 PROCEDURE — 82553 CREATINE MB FRACTION: CPT | Performed by: INTERNAL MEDICINE

## 2021-05-20 PROCEDURE — 99285 EMERGENCY DEPT VISIT HI MDM: CPT

## 2021-05-20 PROCEDURE — 85025 COMPLETE CBC W/AUTO DIFF WBC: CPT | Performed by: EMERGENCY MEDICINE

## 2021-05-20 RX ORDER — ONDANSETRON 2 MG/ML
4 INJECTION INTRAMUSCULAR; INTRAVENOUS EVERY 6 HOURS PRN
Status: DISCONTINUED | OUTPATIENT
Start: 2021-05-20 | End: 2021-05-24 | Stop reason: HOSPADM

## 2021-05-20 RX ORDER — ATORVASTATIN CALCIUM 40 MG/1
40 TABLET, FILM COATED ORAL EVERY EVENING
Status: DISCONTINUED | OUTPATIENT
Start: 2021-05-20 | End: 2021-05-24 | Stop reason: HOSPADM

## 2021-05-20 RX ORDER — MAGNESIUM HYDROXIDE/ALUMINUM HYDROXICE/SIMETHICONE 120; 1200; 1200 MG/30ML; MG/30ML; MG/30ML
30 SUSPENSION ORAL EVERY 6 HOURS PRN
Status: DISCONTINUED | OUTPATIENT
Start: 2021-05-20 | End: 2021-05-24 | Stop reason: HOSPADM

## 2021-05-20 RX ORDER — DOCUSATE SODIUM 100 MG/1
100 CAPSULE, LIQUID FILLED ORAL 2 TIMES DAILY
Status: DISCONTINUED | OUTPATIENT
Start: 2021-05-20 | End: 2021-05-24 | Stop reason: HOSPADM

## 2021-05-20 RX ORDER — PANTOPRAZOLE SODIUM 40 MG/1
40 TABLET, DELAYED RELEASE ORAL
Status: DISCONTINUED | OUTPATIENT
Start: 2021-05-20 | End: 2021-05-24 | Stop reason: HOSPADM

## 2021-05-20 RX ORDER — SODIUM CHLORIDE, SODIUM GLUCONATE, SODIUM ACETATE, POTASSIUM CHLORIDE, MAGNESIUM CHLORIDE, SODIUM PHOSPHATE, DIBASIC, AND POTASSIUM PHOSPHATE .53; .5; .37; .037; .03; .012; .00082 G/100ML; G/100ML; G/100ML; G/100ML; G/100ML; G/100ML; G/100ML
1000 INJECTION, SOLUTION INTRAVENOUS ONCE
Status: DISCONTINUED | OUTPATIENT
Start: 2021-05-21 | End: 2021-05-21

## 2021-05-20 RX ORDER — MECLIZINE HCL 12.5 MG/1
25 TABLET ORAL 3 TIMES DAILY PRN
Status: DISCONTINUED | OUTPATIENT
Start: 2021-05-20 | End: 2021-05-24 | Stop reason: HOSPADM

## 2021-05-20 RX ORDER — OXYCODONE HYDROCHLORIDE 5 MG/1
5 TABLET ORAL EVERY 4 HOURS PRN
Status: DISCONTINUED | OUTPATIENT
Start: 2021-05-20 | End: 2021-05-24 | Stop reason: HOSPADM

## 2021-05-20 RX ORDER — PAROXETINE HYDROCHLORIDE 20 MG/1
60 TABLET, FILM COATED ORAL DAILY
Status: DISCONTINUED | OUTPATIENT
Start: 2021-05-20 | End: 2021-05-24 | Stop reason: HOSPADM

## 2021-05-20 RX ORDER — ZIPRASIDONE HYDROCHLORIDE 40 MG/1
80 CAPSULE ORAL DAILY
Status: DISCONTINUED | OUTPATIENT
Start: 2021-05-20 | End: 2021-05-24 | Stop reason: HOSPADM

## 2021-05-20 RX ORDER — ASPIRIN 81 MG/1
81 TABLET ORAL DAILY
Status: DISCONTINUED | OUTPATIENT
Start: 2021-05-20 | End: 2021-05-24 | Stop reason: HOSPADM

## 2021-05-20 RX ORDER — SENNOSIDES 8.6 MG
1 TABLET ORAL DAILY
Status: DISCONTINUED | OUTPATIENT
Start: 2021-05-20 | End: 2021-05-24 | Stop reason: HOSPADM

## 2021-05-20 RX ORDER — SODIUM CHLORIDE, SODIUM GLUCONATE, SODIUM ACETATE, POTASSIUM CHLORIDE, MAGNESIUM CHLORIDE, SODIUM PHOSPHATE, DIBASIC, AND POTASSIUM PHOSPHATE .53; .5; .37; .037; .03; .012; .00082 G/100ML; G/100ML; G/100ML; G/100ML; G/100ML; G/100ML; G/100ML
1000 INJECTION, SOLUTION INTRAVENOUS ONCE
Status: COMPLETED | OUTPATIENT
Start: 2021-05-20 | End: 2021-05-20

## 2021-05-20 RX ORDER — FERROUS SULFATE 325(65) MG
325 TABLET ORAL 2 TIMES DAILY WITH MEALS
Status: DISCONTINUED | OUTPATIENT
Start: 2021-05-20 | End: 2021-05-24 | Stop reason: HOSPADM

## 2021-05-20 RX ORDER — SUCRALFATE 1 G/1
1 TABLET ORAL
Status: DISCONTINUED | OUTPATIENT
Start: 2021-05-20 | End: 2021-05-24 | Stop reason: HOSPADM

## 2021-05-20 RX ORDER — QUETIAPINE FUMARATE 100 MG/1
400 TABLET, FILM COATED ORAL
Status: DISCONTINUED | OUTPATIENT
Start: 2021-05-20 | End: 2021-05-24 | Stop reason: HOSPADM

## 2021-05-20 RX ORDER — POTASSIUM CHLORIDE 14.9 MG/ML
20 INJECTION INTRAVENOUS ONCE
Status: COMPLETED | OUTPATIENT
Start: 2021-05-20 | End: 2021-05-20

## 2021-05-20 RX ORDER — ACETAMINOPHEN 325 MG/1
975 TABLET ORAL EVERY 8 HOURS SCHEDULED
Status: DISCONTINUED | OUTPATIENT
Start: 2021-05-20 | End: 2021-05-22

## 2021-05-20 RX ORDER — LORAZEPAM 1 MG/1
2 TABLET ORAL EVERY 6 HOURS PRN
Status: DISCONTINUED | OUTPATIENT
Start: 2021-05-20 | End: 2021-05-24 | Stop reason: HOSPADM

## 2021-05-20 RX ORDER — LEVOTHYROXINE SODIUM 0.03 MG/1
25 TABLET ORAL
Status: DISCONTINUED | OUTPATIENT
Start: 2021-05-21 | End: 2021-05-24 | Stop reason: HOSPADM

## 2021-05-20 RX ADMIN — PANTOPRAZOLE SODIUM 40 MG: 40 TABLET, DELAYED RELEASE ORAL at 15:49

## 2021-05-20 RX ADMIN — SODIUM CHLORIDE 1000 ML: 0.9 INJECTION, SOLUTION INTRAVENOUS at 10:25

## 2021-05-20 RX ADMIN — FERROUS SULFATE TAB 325 MG (65 MG ELEMENTAL FE) 325 MG: 325 (65 FE) TAB at 15:49

## 2021-05-20 RX ADMIN — ACETAMINOPHEN 975 MG: 325 TABLET, FILM COATED ORAL at 21:42

## 2021-05-20 RX ADMIN — IOHEXOL 100 ML: 350 INJECTION, SOLUTION INTRAVENOUS at 11:42

## 2021-05-20 RX ADMIN — LORAZEPAM 2 MG: 1 TABLET ORAL at 17:41

## 2021-05-20 RX ADMIN — ASPIRIN 81 MG: 81 TABLET, COATED ORAL at 15:49

## 2021-05-20 RX ADMIN — QUETIAPINE FUMARATE 400 MG: 100 TABLET ORAL at 21:42

## 2021-05-20 RX ADMIN — POTASSIUM CHLORIDE 20 MEQ: 14.9 INJECTION, SOLUTION INTRAVENOUS at 15:53

## 2021-05-20 RX ADMIN — SUCRALFATE 1 G: 1 TABLET ORAL at 21:42

## 2021-05-20 RX ADMIN — SENNOSIDES 8.6 MG: 8.6 TABLET ORAL at 15:49

## 2021-05-20 RX ADMIN — DOCUSATE SODIUM 100 MG: 100 CAPSULE, LIQUID FILLED ORAL at 17:16

## 2021-05-20 RX ADMIN — SODIUM CHLORIDE, SODIUM GLUCONATE, SODIUM ACETATE, POTASSIUM CHLORIDE, MAGNESIUM CHLORIDE, SODIUM PHOSPHATE, DIBASIC, AND POTASSIUM PHOSPHATE 1000 ML: .53; .5; .37; .037; .03; .012; .00082 INJECTION, SOLUTION INTRAVENOUS at 15:48

## 2021-05-20 RX ADMIN — CEFTRIAXONE SODIUM 1000 MG: 10 INJECTION, POWDER, FOR SOLUTION INTRAVENOUS at 12:17

## 2021-05-20 RX ADMIN — SUCRALFATE 1 G: 1 TABLET ORAL at 15:49

## 2021-05-20 RX ADMIN — ATORVASTATIN CALCIUM 40 MG: 40 TABLET, FILM COATED ORAL at 17:16

## 2021-05-20 RX ADMIN — ENOXAPARIN SODIUM 40 MG: 40 INJECTION SUBCUTANEOUS at 15:48

## 2021-05-20 NOTE — SEPSIS NOTE
Sepsis Note   Yumiko Lozano 61 y o  female MRN: [id number]  Unit/Bed#: ED 03 Encounter: [id number]      qSOFA     Row Name 05/20/21 1139 05/20/21 1100 05/20/21 1030 05/20/21 1013       Altered mental status GCS < 15  0  --  --  --     Respiratory Rate > / =22  --  --  --  1     Systolic BP < / =363  --  0  0  0     Q Sofa Score  1  1  1  1         Initial Sepsis Screening     Row Name 05/20/21 1149 05/20/21 1037             Is the patient's history suggestive of a new or worsening infection? (!) Yes (Proceed)  -GR  (!) Yes (Proceed)  -GR       Suspected source of infection  suspect infection, source unknown  -GR  suspect infection, source unknown  -GR       Are two or more of the following signs & symptoms of infection both present and new to the patient? (!) Yes (Proceed)  -GR  (!) Yes (Proceed)  -GR       Indicate SIRS criteria  Tachycardia > 90 bpm;Tachypnea > 20 resp per min  -GR  Tachycardia > 90 bpm;Tachypnea > 20 resp per min  -GR       If the answer is yes to both questions, suspicion of sepsis is present  --  --       If severe sepsis is present AND tissue hypoperfusion perists in the hour after fluid resuscitation or lactate > 4, the patient meets criteria for SEPTIC SHOCK  --  --       Are any of the following organ dysfunction criteria present within 6 hours of suspected infection and SIRS criteria that are NOT considered to be chronic conditions?   (!) Yes  -GR  --       Organ dysfunction  Lactate > 2 0 mmol/L  -GR  --       Date of presentation of severe sepsis  05/20/21  -GR  --       Time of presentation of severe sepsis  1150  -GR  --       Tissue hypoperfusion persists in the hour after crystalloid fluid administration, evidenced, by either:  --  --       Was hypotension present within one hour of the conclusion of crystalloid fluid administration?  --  --       Date of presentation of septic shock  --  --       Time of presentation of septic shock  --  --         User Key  (r) = Recorded By, (t) = Taken By, (c) = Cosigned By    [address] Name Provider Gerard Cagle MD Physician

## 2021-05-20 NOTE — PLAN OF CARE
Problem: Potential for Falls  Goal: Patient will remain free of falls  Description: INTERVENTIONS:  - Assess patient frequently for physical needs  -  Identify cognitive and physical deficits and behaviors that affect risk of falls    -  [place] fall precautions as indicated by assessment   - Educate patient/family on patient safety including physical limitations  - Instruct patient to call for assistance with activity based on assessment  - Modify environment to reduce risk of injury  - Consider OT/PT consult to assist with strengthening/mobility  Outcome: Progressing     Problem: Prexisting or High Potential for Compromised Skin Integrity  Goal: Skin integrity is maintained or improved  Description: INTERVENTIONS:  - Identify patients at risk for skin breakdown  - Assess and monitor skin integrity  - Assess and monitor nutrition and hydration status  - Monitor labs   - Assess for incontinence   - Turn and reposition patient  - Assist with mobility/ambulation  - Relieve pressure over bony prominences  - Avoid friction and shearing  - Provide appropriate hygiene as needed including keeping skin clean and dry  - Evaluate need for skin moisturizer/barrier cream  - Collaborate with interdisciplinary team   - Patient/family teaching  - Consider wound care consult   Outcome: Progressing     Problem: PAIN - ADULT  Goal: Verbalizes/displays adequate comfort level or baseline comfort level  Description: Interventions:  - Encourage patient to monitor pain and request assistance  - Assess pain using appropriate pain scale  - Administer analgesics based on type and severity of pain and evaluate response  - Implement non-pharmacological measures as appropriate and evaluate response  - Consider cultural and social influences on pain and pain management  - Notify physician/advanced practitioner if interventions unsuccessful or patient reports new pain  Outcome: Progressing     Problem: INFECTION - ADULT  Goal: Absence or prevention of progression during hospitalization  Description: INTERVENTIONS:  - Assess and monitor for signs and symptoms of infection  - Monitor lab/diagnostic results  - Monitor all insertion sites, i e  indwelling lines, tubes, and drains  - Monitor endotracheal if appropriate and nasal secretions for changes in amount and color  - [place] appropriate cooling/warming therapies per order  - Administer medications as ordered  - Instruct and encourage patient and family to use good hand hygiene technique  - Identify and instruct in appropriate isolation precautions for identified infection/condition  Outcome: Progressing  Goal: Absence of fever/infection during neutropenic period  Description: INTERVENTIONS:  - Monitor WBC    Outcome: Progressing     Problem: SAFETY ADULT  Goal: Patient will remain free of falls  Description: INTERVENTIONS:  - Assess patient frequently for physical needs  -  Identify cognitive and physical deficits and behaviors that affect risk of falls    -  [place] fall precautions as indicated by assessment   - Educate patient/family on patient safety including physical limitations  - Instruct patient to call for assistance with activity based on assessment  - Modify environment to reduce risk of injury  - Consider OT/PT consult to assist with strengthening/mobility  Outcome: Progressing  Goal: Maintain or return to baseline ADL function  Description: INTERVENTIONS:  -  Assess patient's ability to carry out ADLs; assess patient's baseline for ADL function and identify physical deficits which impact ability to perform ADLs (bathing, care of mouth/teeth, toileting, grooming, dressing, etc )  - Assess/evaluate cause of self-care deficits   - Assess range of motion  - Assess patient's mobility; develop plan if impaired  - Assess patient's need for assistive devices and provide as appropriate  - Encourage maximum independence but intervene and supervise when necessary  - Involve family in performance of ADLs  - Assess for home care needs following discharge   - Consider OT consult to assist with ADL evaluation and planning for discharge  - Provide patient education as appropriate  Outcome: Progressing  Goal: Maintain or return mobility status to optimal level  Description: INTERVENTIONS:  - Assess patient's baseline mobility status (ambulation, transfers, stairs, etc )    - Identify cognitive and physical deficits and behaviors that affect mobility  - Identify mobility aids required to assist with transfers and/or ambulation (gait belt, sit-to-stand, lift, walker, cane, etc )  - [place] fall precautions as indicated by assessment  - Record patient progress and toleration of activity level on Mobility SBAR; progress patient to next Phase/Stage  - Instruct patient to call for assistance with activity based on assessment  - Consider rehabilitation consult to assist with strengthening/weightbearing, etc   Outcome: Progressing     Problem: DISCHARGE PLANNING  Goal: Discharge to home or other facility with appropriate resources  Description: INTERVENTIONS:  - Identify barriers to discharge w/patient and caregiver  - Arrange for needed discharge resources and transportation as appropriate  - Identify discharge learning needs (meds, wound care, etc )  - Arrange for interpretive services to assist at discharge as needed  - Refer to Case Management Department for coordinating discharge planning if the patient needs post-hospital services based on physician/advanced practitioner order or complex needs related to functional status, cognitive ability, or social support system  Outcome: Progressing     Problem: Knowledge Deficit  Goal: Patient/family/caregiver demonstrates understanding of disease process, treatment plan, medications, and discharge instructions  Description: Complete learning assessment and assess knowledge base    Interventions:  - Provide teaching at level of understanding  - Provide teaching via preferred learning methods  Outcome: Progressing

## 2021-05-20 NOTE — ASSESSMENT & PLAN NOTE
- Patient is s/p Hiatal hernia repair (5/11) performed at [facility]] by Dr Patel Nguyen  -patient endorses she is supposed to be on a pureed diet since her surgery for the next 8 weeks, but states she has only been eating bread and water  -GI stated patient has not returned phone calls for follow up  -Denies current abdominal complaints  -consult to general surgery appreciated   -Initiate clear liquid diet/ nutrition supplementation as needed

## 2021-05-20 NOTE — SEPSIS NOTE
Sepsis Note   Ethel Lucero 61 y o  female MRN: [id number]  Unit/Bed#: [facility] 923-01 Encounter: [id number]      qSOFA     Row Name 05/20/21 1430 05/20/21 1415 05/20/21 1200 05/20/21 1139 05/20/21 1100    Altered mental status GCS < 15  --  --  --  0  --    Respiratory Rate > / =22  0  0  1  --  --    Systolic BP < / =038  0  0  0  --  0    Q Sofa Score  0  0  1  1  1    Row Name 05/20/21 1030 05/20/21 1013             Altered mental status GCS < 15  --  --       Respiratory Rate > / =22  --  1       Systolic BP < / =689  0  0       Q Sofa Score  1  1           Initial Sepsis Screening     Row Name 05/20/21 1149 05/20/21 1037             Is the patient's history suggestive of a new or worsening infection? (!) Yes (Proceed)  -GR  (!) Yes (Proceed)  -GR       Suspected source of infection  suspect infection, source unknown  -GR  suspect infection, source unknown  -GR       Are two or more of the following signs & symptoms of infection both present and new to the patient? (!) Yes (Proceed)  -GR  (!) Yes (Proceed)  -GR       Indicate SIRS criteria  Tachycardia > 90 bpm;Tachypnea > 20 resp per min  -GR  Tachycardia > 90 bpm;Tachypnea > 20 resp per min  -GR       If the answer is yes to both questions, suspicion of sepsis is present  --  --       If severe sepsis is present AND tissue hypoperfusion perists in the hour after fluid resuscitation or lactate > 4, the patient meets criteria for SEPTIC SHOCK  --  --       Are any of the following organ dysfunction criteria present within 6 hours of suspected infection and SIRS criteria that are NOT considered to be chronic conditions?   (!) Yes  -GR  --       Organ dysfunction  Lactate > 2 0 mmol/L  -GR  --       Date of presentation of severe sepsis  05/20/21  -GR  --       Time of presentation of severe sepsis  1150  -GR  --       Tissue hypoperfusion persists in the hour after crystalloid fluid administration, evidenced, by either:  --  --       Was hypotension present within one hour of the conclusion of crystalloid fluid administration?  --  --       Date of presentation of septic shock  --  --       Time of presentation of septic shock  --  --         User Key  (r) = Recorded By, (t) = Taken By, (c) = Cosigned By    [id number]  Name Provider Type    Kelsey Camargo MD Physician               Default Flowsheet Data (last 720 hours)      Sepsis Reassess     Row Name 05/20/21 9223                   Repeat Volume Status and Tissue Perfusion Assessment Performed    Repeat Volume Status and Tissue Perfusion Assessment Performed  Yes  -GR           Volume Status and Tissue Perfusion Post Fluid Resuscitation * Must Document All *    Vital Signs Reviewed (HR, RR, BP, T)  Yes  -GR        Shock Index Reviewed  --        Arterial Oxygen Saturation Reviewed (POx, SaO2 or SpO2)  --        Cardio  Normal S1/S2; Regular rate and rhythm  -GR        Pulmonary  Normal effort;Clear to auscultation  -GR        Capillary Refill  Brisk  -GR        Peripheral Pulses  --        Skin  Warm  -GR        Urine output assessed  --           *OR*   Intensive Monitoring- Must Document One of the Following Four *:    Vital Signs Reviewed  Yes  -GR        * Central Venous Pressure (CVP or RAP)  --        * Central Venous Oxygen (SVO2, ScvO2 or Oxygen saturation via central catheter)  --        * Bedside Cardiovascular US in IVC diameter and % collapse  --        * Passive Leg Raise OR Crystalloid Challenge  --          User Key  (r) = Recorded By, (t) = Taken By, (c) = Cosigned By    [address]  Name Provider Type    Kelsey Camargo MD Physician

## 2021-05-20 NOTE — H&P
[id number] 1960, 61 y o  female MRN: [id number]  Unit/Bed#: [facility] 923-01 Encounter: [id number]  Primary Care Provider: Kiel Fair MD   Date and time admitted to hospital: 5/20/2021 10:06 AM    Hyperlipidemia  Assessment & Plan  - Patient prescribed Lipitor 40 mg QD  -Recent documentation states patient endorsed she stop taking her lipitor, However currently patient stated she is taking it    - Last HDl -->69 ; Triglycerides 113    Hiatal hernia with gastroesophageal reflux disease and esophagitis  Assessment & Plan  - Patient is s/p Hiatal hernia repair (5/11) performed at [facility] by Dr Reva Junior  -patient endorses she is supposed to be on a pureed diet since her surgery for the next 8 weeks, but states she has only been eating bread and water  -GI stated patient has not returned phone calls for follow up  -Denies current abdominal complaints  -consult to general surgery appreciated   -Initiate clear liquid diet/ nutrition supplementation as needed    Gastroesophageal reflux disease with esophagitis without hemorrhage  Assessment & Plan  - Patient is s/p Hiatal hernia repair (5/11) performed at [facility] by Dr Reva Junior  -patient endorses she is supposed to be on a pureed diet since her surgery for the next 8 weeks, but states she has only been eating bread and water  -GI stated patient has not returned phone calls for follow up  -Denies current abdominal complaints  -consult to general surgery appreciated   -Initiate clear liquid diet/ nutrition supplementation as needed        Essential hypertension  Assessment & Plan  - Patient endorses history of HTN  -SBP range 130s/dropped to 106  -Monitor for hypotension     Schizoaffective disorder ([facility] [address] )  Assessment & Plan  -Patient history of schizoaffective disorder  -Previously on Geodon and Seroquel endorses she stopped taking her medication 1 week ago because she would not  from surgery  -Resume outpatient medication of geodon  And seroquel  -Patient states her mother hates her and does not want her in the house- Suspecting delusion  -Denies hallucinations currently  -Patient's mother recently left message stating her daughter was acting incoherent and she needed assistance from CM on what medications to take    -No recent psychiatric admissions; patient endorses her psychiatrist moved to [facility] and does not have one anymore  -Psychiatric consult placed    * Sepsis [facility])  Assessment & Plan  -Patient arrived to ER with chief complaint of SOB that has been progressively worsening x 3 days  -POA with tachycardia of 122, RR of 24/ lactic acid of 3 4/ fever 100 9  -Etiology unknown; could be 2/2 recent hiatal hernia repair 2 weeks ago/ pneumonia  -CTA (5/20) revealed no PE, inferior lingula atelectasis; concerns for PNA (HAP/viral)  -Lactic acid trending down to 1 9   -Blood cultures obtained and pending; COVID negative  -Urine pending  -continue to monitor SP02 >92%  Trend WBC/ Fever curve and PCT  -Consider adding Vancomycin due to recent hospital admission for MRSA coverage        VTE Prophylaxis: Enoxaparin (Lovenox)  / sequential compression device   Code Status: full code   POLST: POLST is not applicable to this patient  Discussion with family:      Anticipated Length of Stay:  Patient will be admitted on an Inpatient basis with an anticipated length of stay of  More than 2 midnights  Justification for Hospital Stay: due to sepsis, presumed pneumonia and aspiration, esophagitis  Total Time for Visit, including Counseling / Coordination of Care: 45 minutes  Greater than 50% of this total time spent on direct patient counseling and coordination of care  Chief Complaint:   Worsening SOB x 3 days    History of Present Illness:    Brian Rocha is a 61 y o  female who presents with With a PMH of s/p hiatal hernia repair (5/11), schizoaffective disorder   HTN, HLD, GERD, and who presents with a chief complaint of worsening shortness of breath over the past 3 days  She states she she was standing and making breakfast when her shortness of breath got so bad she needed to go to the ER  She describes her SOB of having difficulty breathing in and not getting enough air  Patient denied alleviating factors, but admitted she has increased VANG and has been limited in her typical activities  She admits to previously expierencing shortness of breath related to anxiety, but states "this feels different"  She is currently 97% on RA  She also endorses feeling feverish over the past three days as well, but did not take her temperature  Of note, patient states she has not taken her psychiatric medications because she could not pick them up from the pharmacy  She denies current hallucinations, but states that her mother does not like her and wants her out of the house  Review of Systems:    Review of Systems   Constitutional: Positive for fever  Patient endorses feeling feverish x 3 days; 100 9  In ER   HENT: Negative  Eyes: Negative  Respiratory: Positive for shortness of breath  Negative for cough, choking, chest tightness and wheezing  Cardiovascular: Positive for palpitations  Negative for chest pain and leg swelling  Patient endorses intermittent palpations that have been present x 1 week; does not relate to SOB   Gastrointestinal: Negative for abdominal distention, abdominal pain, blood in stool, constipation, diarrhea, nausea and vomiting  On initial exam, patient denied abdominal pain  When revisited, patient endorsed a throbbing pain in epigastric area that began after moving to her inpatient room    Genitourinary: Negative for difficulty urinating, flank pain, hematuria and pelvic pain  Neurological: Positive for dizziness  Negative for syncope, speech difficulty, light-headedness and headaches          Patient endorses feeling Dizzy this morning; states she has not been taking meclazine   Psychiatric/Behavioral: Negative for confusion, hallucinations, self-injury and suicidal ideas  The patient is nervous/anxious  All other systems reviewed and are negative  Past Medical and Surgical History:     Past Medical History:   Diagnosis Date    Anxiety     Arthritis     Back pain at L4-L5 level     Schreiber esophagus     Bulimia     Colon polyp     Disease of thyroid gland     hypothyroid    Ear problems     GERD (gastroesophageal reflux disease)     History of transfusion     Hyperlipidemia     Hypothyroidism     Leukopenia     Nasal congestion     NMS (neuroleptic malignant syndrome) 01/03/2020    Pneumonia     Rheumatoid arthritis involving right hip (HCC)     Sciatica     Seizure ([facility] [address] )     due to medication mix up 8/2018 only one historically    Seizures ([facility] [address] )     Synovial cyst of lumbar spine     Vertigo     Weight gain        Past Surgical History:   Procedure Laterality Date    BONE MARROW BIOPSY      BREAST SURGERY      enlargement     CLOSED REDUCTION DISTAL FEMUR FRACTURE      COLONOSCOPY      COSMETIC SURGERY      HIP ARTHROPLASTY Right 1/2/2020    Procedure: REVISION TOTAL HIP ARTHROPLASTY WITH REPAIR OF PARIPROSTHETIC FRACTURE - POSTERIOR APPROACH;  Surgeon: Kaya Florentino MD;  [facility];  Service: Orthopedics    LA ESOPHAGOGASTRODUODENOSCOPY TRANSORAL DIAGNOSTIC N/A 5/11/2021    Procedure: ESOPHAGOGASTRODUODENOSCOPY (EGD); Surgeon: Orion Angelo MD;  [facility];  Service: General    LA ESOPHAGUS SURG PROCEDURE UNLISTED N/A 5/11/2021    Procedure: FUNDOPLICATION TRANSORAL INCISIONLESS;  Surgeon: Amanda Ferrer MD;  [facility];  Service: Gastroenterology    LA LAP, REPAIR PARAESOPHAGEAL HERNIA, INCL FUNDOPLASTY W/ MESH N/A 5/11/2021    Procedure: REPAIR HERNIA PARAESOPHAGEAL  LAPAROSCOPIC;  Surgeon:  Orion Angelo MD;  [facility];  Service: General    LA TOTAL HIP ARTHROPLASTY Right 12/11/2019 Procedure: ARTHROPLASTY HIP TOTAL ANTERIOR;  Surgeon: Bro Pearl MD;  [facility];  Service: Orthopedics    RHINOPLASTY      SINUS SURGERY         Meds/Allergies:    Prior to Admission medications    Medication Sig Start Date End Date Taking?  Authorizing Provider   acetaminophen (TYLENOL) 325 mg tablet Take 3 tablets (975 mg total) by mouth every 8 (eight) hours  Patient not taking: Reported on 5/19/2021 5/14/21   ABENA West   aspirin (ECOTRIN LOW STRENGTH) 81 mg EC tablet Take 1 tablet (81 mg total) by mouth daily  Patient not taking: Reported on 5/19/2021 5/15/21   ABENA West   atorvastatin (LIPITOR) 40 mg tablet Take 1 tablet (40 mg total) by mouth every evening  Patient not taking: Reported on 5/19/2021 5/14/21   ABENA West   ferrous sulfate 325 (65 Fe) mg tablet Take 1 tablet (325 mg total) by mouth 2 (two) times a day with meals  Patient not taking: Reported on 5/19/2021 11/23/20   Brunilda Cross PA-C   levothyroxine 25 mcg tablet Take 1 tablet (25 mcg total) by mouth daily 5/19/21   Nadine Echavarria MD   LORazepam (ATIVAN) 2 mg tablet Take 1 tablet (2 mg total) by mouth every 6 (six) hours as needed for anxiety  Patient not taking: Reported on 5/19/2021 4/16/21   Nadine Echavarria MD   meclizine (ANTIVERT) 25 mg tablet Take 1 tablet (25 mg total) by mouth 3 (three) times a day as needed for dizziness  Patient not taking: Reported on 5/19/2021 3/30/20   Nadine Echavarria MD   Multiple Vitamin (MULTIVITAMIN) capsule Take 1 capsule by mouth daily  Patient not taking: Reported on 5/19/2021 11/14/19   Karolyn Atkins PA-C   ondansetron (ZOFRAN-ODT) 4 mg disintegrating tablet Take 1 tablet (4 mg total) by mouth every 6 (six) hours as needed for nausea or vomiting  Patient not taking: Reported on 5/19/2021 5/14/21   ABENA West   oxyCODONE (ROXICODONE) 5 mg immediate release tablet Take 1 tablet (5 mg total) by mouth every 4 (four) hours as needed for moderate pain for up to 10 daysMax Daily Amount: 30 mg  Patient not taking: Reported on 5/19/2021 5/14/21 5/24/21  ABENA Vasquez   pantoprazole (PROTONIX) 40 mg tablet Take 1 tablet (40 mg total) by mouth 2 (two) times a day before meals for 14 days  Patient not taking: Reported on 5/19/2021 5/14/21 5/28/21  ABENA Vasquez   pantoprazole (PROTONIX) 40 mg tablet Take 1 tablet (40 mg total) by mouth 2 (two) times a day 5/18/21   Kalli Mejia MD   PARoxetine (PAXIL) 30 mg tablet Take 2 tablets (60 mg total) by mouth daily  Patient not taking: Reported on 5/19/2021 12/18/20   Ginny Lunsford MD   QUEtiapine (SEROquel) 400 MG tablet Take 1 tablet (400 mg total) by mouth daily at bedtime 5/19/21   Ginny Lunsford MD   sucralfate (CARAFATE) 1 g/10 mL suspension Take 10 mL (1 g total) by mouth 4 (four) times a day for 14 days  Patient not taking: Reported on 5/19/2021 5/14/21 5/28/21  ABENA Vasquez   triamcinolone (KENALOG) 0 1 % cream Apply 1 application topically 2 (two) times a day To affected area  Patient not taking: Reported on 5/19/2021 4/12/21   Ginny Lunsford MD   ziprasidone (GEODON) 80 mg capsule Take 1 capsule (80 mg total) by mouth daily 5/19/21   Ginny Lunsford MD     I have reviewed home medications with patient personally  Allergies:    Allergies   Allergen Reactions    Risperdal [Risperidone] Shortness Of Breath     Rapid heart beat, SOB    Zyprexa [Olanzapine] Shortness Of Breath     Rapid heartbeat    Latex Rash     Band aids, adhesives wears normal underwear, can eat bananas  USE PAPER TAPE       Social History:     Marital Status: Single   Occupation: Unemplyoed  Patient Pre-hospital Living Situation: Lives with mother  Patient Pre-hospital Level of Mobility: No mobility restrictions   Patient Pre-hospital Diet Restrictions: Full liquid diet   Substance Use History:   Social History     Substance and Sexual Activity   Alcohol Use Not Currently    Frequency: Never    Binge frequency: Never     Social History     Tobacco Use   Smoking Status Never Smoker   Smokeless Tobacco Never Used     Social History     Substance and Sexual Activity   Drug Use Not Currently       Family History:    non-contributory    Physical Exam:     Vitals:   Blood Pressure: 128/74 (05/20/21 2053)  Pulse: 95 (05/20/21 2053)  Temperature: (!) 97 4 °F (36 3 °C) (05/20/21 2053)  Temp Source: Rectal (05/20/21 1013)  Respirations: 17 (05/20/21 2053)  Weight - Scale: 72 6 kg (160 lb 0 9 oz) (05/20/21 1013)  SpO2: 98 % (05/20/21 2053)    Physical Exam  Vitals signs reviewed  Constitutional:       General: She is not in acute distress  Appearance: She is not toxic-appearing or diaphoretic  HENT:      Head: Normocephalic and atraumatic  Right Ear: Tympanic membrane and ear canal normal  There is no impacted cerumen  Left Ear: Tympanic membrane and ear canal normal  There is no impacted cerumen  Nose: Nose normal  No congestion or rhinorrhea  Mouth/Throat:      Mouth: Mucous membranes are moist       Comments: Noted poor dentition and Prosthetic teeth  Eyes:      Extraocular Movements: Extraocular movements intact  Comments: Pinpoint pupils B/l minimally reactive to light   Neck:      Thyroid: No thyroid mass or thyroid tenderness  Trachea: Trachea normal    Cardiovascular:      Rate and Rhythm: Regular rhythm  Tachycardia present  Pulses: Normal pulses  Heart sounds: Normal heart sounds  No murmur  No friction rub  No gallop  Pulmonary:      Effort: No accessory muscle usage, prolonged expiration or retractions  Breath sounds: Decreased air movement present  No decreased breath sounds, wheezing or rhonchi  Comments: Decreased air movement RLL/LLL  Chest:      Chest wall: No tenderness  Abdominal:      General: Abdomen is flat  A surgical scar is present  Bowel sounds are decreased  Palpations: Abdomen is soft  There is no mass  Tenderness:  There is no abdominal tenderness  Hernia: No hernia is present  Comments: Patient with laparoscopic surgical incisions healing well  No erythema edema or drainage  Multiple areas of bruising across abdomen 2/2 Hiatal hernia repair   Musculoskeletal:         General: No swelling  Right lower leg: No edema  Left lower leg: No edema  Lymphadenopathy:      Cervical: No cervical adenopathy  Skin:     General: Skin is warm and dry  Findings: Lesion present  Comments: Excoriations B/L on Lower extremities   Neurological:      General: No focal deficit present  Mental Status: She is alert and oriented to person, place, and time  Cranial Nerves: No cranial nerve deficit  Motor: No weakness  Psychiatric:         Attention and Perception: She does not perceive auditory or visual hallucinations  Mood and Affect: Mood is anxious  Thought Content: Thought content is delusional          Cognition and Memory: Cognition and memory normal       Comments: Patient states her mother hates her and doesn't want her living with her             Additional Data:     Lab Results: I have personally reviewed pertinent reports        Results from last 7 days   Lab Units 05/20/21  1026   WBC Thousand/uL 7 96   HEMOGLOBIN g/dL 10 9*   HEMATOCRIT % 33 1*   PLATELETS Thousands/uL 367   NEUTROS PCT % 82*   LYMPHS PCT % 9*   MONOS PCT % 7   EOS PCT % 1     Results from last 7 days   Lab Units 05/20/21  1026   SODIUM mmol/L 135*   POTASSIUM mmol/L 3 0*   CHLORIDE mmol/L 98*   CO2 mmol/L 27   BUN mg/dL 11   CREATININE mg/dL 0 79   ANION GAP mmol/L 10   CALCIUM mg/dL 10 0   ALBUMIN g/dL 3 3*   TOTAL BILIRUBIN mg/dL 0 28   ALK PHOS U/L 85   ALT U/L 36   AST U/L 21   GLUCOSE RANDOM mg/dL 123     Results from last 7 days   Lab Units 05/20/21  1026   INR  0 98             Results from last 7 days   Lab Units 05/20/21  1217 05/20/21  1026   LACTIC ACID mmol/L 1 9 3 4*   PROCALCITONIN ng/ml  --  0 08 Imaging: I have personally reviewed pertinent reports  PE Study with CT abdomen &pelvis with contrast   Final Result by Lazarus Harts, MD (05/20 3100)      CTA chest:      No central through proximal segmental pulmonary embolus  Limited evaluation of the peripheral pulmonary arteries  Inferior lingular atelectasis and dependent hypoventilatory changes in the upper and lower lobes  Minimal fluid in diffusely patulous esophagus attributed to gastroesophageal reflux  Otherwise, no evidence of acute thoracic process  CT abdomen and pelvis:      No evidence of acute abdominopelvic process  Thickening at the GE junction attributed to recent hiatal hernia repair  Workstation performed: [id number]             EKG, Pathology, and Other Studies Reviewed on Admission:   · EKG: No changes from previous EKG  NSR    Allscripts / Epic Records Reviewed: Yes     ** Please Note: This note has been constructed using a voice recognition system   **

## 2021-05-20 NOTE — ASSESSMENT & PLAN NOTE
-Patient history of schizoaffective disorder  -Previously on Geodon and Seroquel endorses she stopped taking her medication 1 week ago because she would not  from surgery  -Resume outpatient medication of geodon  And seroquel  -Patient states her mother hates her and does not want her in the house- Suspecting delusion  -Denies hallucinations currently  -Patient's mother recently left message stating her daughter was acting incoherent and she needed assistance from  on what medications to take    -No recent psychiatric admissions; patient endorses her psychiatrist moved to [facility] and does not have one anymore  -Psychiatric consult placed

## 2021-05-20 NOTE — ASSESSMENT & PLAN NOTE
-Patient arrived to ER with chief complaint of SOB that has been progressively worsening x 3 days  -POA with tachycardia of 122, RR of 24/ lactic acid of 3 4/ fever 100 9  -Etiology unknown; could be 2/2 recent hiatal hernia repair 2 weeks ago/ pneumonia  -CTA (5/20) revealed no PE, inferior lingula atelectasis; concerns for PNA (HAP/viral)  -Lactic acid trending down to 1 9   -Blood cultures obtained and pending; COVID negative  -Urine pending  -continue to monitor SP02 >92%  Trend WBC/ Fever curve and PCT  -Consider adding Vancomycin due to recent hospital admission for MRSA coverage

## 2021-05-20 NOTE — ED PROVIDER NOTES
Emergency Department Note- Yumiko Lozano 61 y o  female MRN: [id number]    Unit/Bed#: ED 03 Encounter: [id number]        History of Present Illness   HPI:  Yumiko Lozano is a 61 y o  female who presents with shortness of breath that woke her up today  Patient states she had recent abdominal surgery for hiatal hernia repair 2 weeks ago at an outside facility  Patient with no abdominal pain states she does not feel feverish however was diaphoretic this morning and has a temperature of a 100 4° rectally in the emergency department  Patient with no chest pain no cough no sputum production no urinary complaints no vomiting no diarrhea no back pain  Patient also does not note any rashes leg pain or calf pain  No leg swelling  Patient with a history of schizoaffective disorder hypothyroidism and GERD hyperlipidemia  Patient states she feels improved at this time  REVIEW OF SYSTEMS    Constitutional: Negative for chills, positive fatigue and fever  HENT: Negative for ear pain, sore throat and trouble swallowing  Eyes: Negative for photophobia, pain and visual disturbance  Respiratory: Negative for cough, chest tightness and positive shortness of breath  Cardiovascular: Negative for chest pain and palpitations  Gastrointestinal: Negative for abdominal pain, constipation, diarrhea, nausea and vomiting  Genitourinary: Negative for dysuria, flank pain, frequency and hematuria  Musculoskeletal: Negative for back pain and neck pain  Skin: Negative for color change and rash  Neurological: Negative for dizziness, weakness, light-headedness and headaches  Psychiatric/Behavioral: Negative for confusion  The patient is not nervous/anxious      All systems reviewed and negative except as noted above or in HPI         Historical Information   Past Medical History:   Diagnosis Date    Anxiety     Arthritis     Back pain at L4-L5 level     Schreiber esophagus     Bulimia     Colon polyp  Disease of thyroid gland     hypothyroid    Ear problems     GERD (gastroesophageal reflux disease)     History of transfusion     Hyperlipidemia     Hypothyroidism     Leukopenia     Nasal congestion     NMS (neuroleptic malignant syndrome) 01/03/2020    Pneumonia     Rheumatoid arthritis involving right hip (HCC)     Sciatica     Seizure ([facility] [address] )     due to medication mix up 8/2018 only one historically    Seizures ([facility] [address] )     Synovial cyst of lumbar spine     Vertigo     Weight gain      Past Surgical History:   Procedure Laterality Date    BONE MARROW BIOPSY      BREAST SURGERY      enlargement     CLOSED REDUCTION DISTAL FEMUR FRACTURE      COLONOSCOPY      COSMETIC SURGERY      HIP ARTHROPLASTY Right 1/2/2020    Procedure: REVISION TOTAL HIP ARTHROPLASTY WITH REPAIR OF PARIPROSTHETIC FRACTURE - POSTERIOR APPROACH;  Surgeon: Ortiz Albrecht MD;  [facility];  Service: Orthopedics    MS ESOPHAGOGASTRODUODENOSCOPY TRANSORAL DIAGNOSTIC N/A 5/11/2021    Procedure: ESOPHAGOGASTRODUODENOSCOPY (EGD); Surgeon: Allie Cardenas MD;  [facility];  Service: General    MS ESOPHAGUS SURG PROCEDURE UNLISTED N/A 5/11/2021    Procedure: FUNDOPLICATION TRANSORAL INCISIONLESS;  Surgeon: Asif Ramos MD;  [facility];  Service: Gastroenterology    MS LAP, REPAIR PARAESOPHAGEAL HERNIA, INCL FUNDOPLASTY W/ MESH N/A 5/11/2021    Procedure: REPAIR HERNIA PARAESOPHAGEAL  LAPAROSCOPIC;  Surgeon:  Allie Cardenas MD;  [facility];  Service: General    MS TOTAL HIP ARTHROPLASTY Right 12/11/2019    Procedure: ARTHROPLASTY HIP TOTAL ANTERIOR;  Surgeon: Ortiz Albrecht MD;  [facility];  Service: Orthopedics    RHINOPLASTY      SINUS SURGERY       Social History   Social History     Substance and Sexual Activity   Alcohol Use Never    Frequency: Never    Binge frequency: Never     Social History     Substance and Sexual Activity   Drug Use No     Social History Tobacco Use   Smoking Status Never Smoker   Smokeless Tobacco Never Used     Family History:   Family History   Problem Relation Age of Onset    Uterine cancer Mother     Esophageal cancer Father     Colon cancer Maternal Grandmother        Meds/Allergies   (Not in a hospital admission)    Allergies   Allergen Reactions    Risperdal [Risperidone] Shortness Of Breath     Rapid heart beat, SOB    Zyprexa [Olanzapine] Shortness Of Breath     Rapid heartbeat    Latex Rash     Band aids, adhesives wears normal underwear, can eat bananas  USE PAPER TAPE       Objective   Vitals: Blood pressure 138/61, pulse (!) 121, temperature 100 4 °F (38 °C), temperature source Rectal, resp  rate (!) 24, weight 72 6 kg (160 lb 0 9 oz), SpO2 95 %, not currently breastfeeding  PHYSICAL EXAM     General Appearance: alert and oriented, nad, non toxic appearing  Skin:  Warm, dry, intact  HEENT: atraumatic, normocephalic, eomi, perll   Neck: Supple, no JVD, no lymphadenopathy, trachea midline, no bruit  Cardiac: rrr, no murmurs, rub, gallops  Pulmonary: lungs cta, no wheezes, rales, rhonchi tachypneic  Gastrointestinal: abdomen soft nontender, good bs, no mass or bruits, no cva tenderness  Extremities: no pedal edema, good pulses, no calf tenderness, no clubbing, no cyanosis  Neuro:  no focal motor or sensory deficits, cn intact  Psych:  Normal mood and affect, normal judgement and insight      Lab Results: Lab Results: I have personally reviewed pertinent lab results  Imaging: I have personally reviewed pertinent reports  EKG, Pathology, and Other Studies: I have personally reviewed pertinent films in PACS    Assessment/Plan     ED Medical Decision Making:  Patient with shortness of breath that could be secondary to fever and infection could be secondary to possible PE with her recent surgery  Will do a full septic workup give IV fluids at this time patient will also get a CT of her chest abdomen and pelvis    Will await giving antibiotics until we can determine if her SIRS is definitely due to infection  ECG 12 Lead Documentation  Date/Time: today/date: 5/20/2021  Performed by: Jude Del Rio  Authorized by: Jude Del Rio     ECG reviewed by me, the ED Provider: yes    Patient location:  ED   Previous ECG:  Compared to current, no change   Rate:  ECG rate assessment: normal    Rhythm: sinus rhythm    Ectopy:  : none    QRS axis:  Normal  Intervals: normal   Q waves: None   ST segments:  Normal  T waves: normal      Impression:  Sinus tachycardia      Portions of the record may have been created with voice recognition software  Occasional wrong word or "sound a like" substitutions may have occurred due to the inherent limitations of voice recognition software  Read the chart carefully and recognize, using context, where substitutions have occurred  Ana Lilia Corral MD  05/20/21 1037    Patient on CT scan with no large PEs  Patient does have atelectasis versus pneumonia in the lingula  Patient's lactate was elevated at 3 4  Patient received 30 cc/kg of IV fluids and Rocephin for presumed pneumonia  I contacted the [facility] surgery team who agreed patient can be admitted to Medicine and they will be available for consult if needed  Patient was informed that she will be admitted and I discussed this with the slim attending       Ana Lilia Corral MD  05/20/21 6734

## 2021-05-20 NOTE — APP STUDENT NOTE
PAMELLA STUDENT  Inpatient H&P Exam for TRAINING ONLY  Not Part of Legal Medical Record       H&P Exam - Tameka Oliver 61 y o  female MRN: [id number]  Unit/Bed#: ED 03 Encounter: [id number]      Sepsis [facility])  Assessment & Plan  -Patient arrived to ER with chief complaint of SOB that has been progressively worsening x 3 days  -POA with tachycardia of 122, RR of 24/ lactic acid of 3 4/ fever 100 9  -Etiology unknown; could be 2/2 recent hiatal hernia repair 2 weeks ago/ pneumonia  -CTA (5/20) revealed no PE, inferior lingula atelectasis; concerns for PNA (HAP/viral)  -Lactic acid trending down to 1 9   -Blood cultures obtained and pending; COVID negative  -Urine pending  -continue to monitor SP02 >92%  -Consider adding Vancomycin due to recent hospital admission for MRSA coverage        Schizoaffective disorder [facility])  Assessment & Plan  -Patient history of schizoaffective disorder  -Previously on Geodon and Seroquel endorses she stopped taking her medication 1 week ago because she would not  from surgery  -Patient states her mother hates her and does not want her in the house- Suspecting delusion  -Denies hallucinations currently  -No recent psychiatric admissions  -Psychiatric consult placed    Hyperlipidemia  Assessment & Plan  - Patient prescribed Lipitor 40 mg QD  -Recent documentation states patient endorsed she stop taking her lipitor, However currently patient stated she is taking it    - Last HDl -->69 ; Triglycerides 113    Gastroesophageal reflux disease with esophagitis without hemorrhage  Assessment & Plan  - Patient is s/p Hiatal hernia repair (5/11) performed at [place] by Dr Susan Valadez  -patient endorses she is supposed to be on a pureed diet since her surgery for the next 8 weeks, but states she has only been eating bread and water  -GI stated patient has not returned phone calls for follow up  -Denies current abdominal complaints  -consult to general surgery appreciated   -Initiate clear liquid diet/ nutrition supplementation as needed        Essential hypertension  Assessment & Plan  - Patient endorses history of HTN  -SBP range 130s/dropped to 106  -Monitor for hypotension          VTE Prophylaxis: Enoxaparin (Lovenox)  / foot pump applied   Code Status: Full Code  POLST: There is no POLST form on file for this patient (pre-hospital)  Discussion with family: No    Anticipated Length of Stay:  Patient will be admitted on an Inpatient basis with an anticipated length of stay of  > 2 midnights  Justification for Hospital Stay: Patient is was septic on presentation; Concerns for PNA    Total Time for Visit, including Counseling / Coordination of Care: 30 minutes  Greater than 50% of this total time spent on direct patient counseling and coordination of care  Chief Complaint:   SOB worsening x 3 days    History of Present Illness:    Lori Velarde is a 61 y o  female  With a PMH of s/p hiatal hernia repair (5/11), schizoaffective disorder  HTN, HLD, GERD, and who presents with a chief complaint of worsening shortness of breath over the past 3 days  She states she she was standing and making breakfast when her shortness of breath got so bad she needed to go to the ER  She describes her SOB of having difficulty breathing in and not getting enough air  Patient denied alleviating factors, but admitted she has increased VANG and has been limited in her typical activities  She admits to previously expierencing shortness of breath related to anxiety, but states "this feels different"  She is currently 97% on RA  She also endorses feeling feverish over the past three days as well, but did not take her temperature  Of note, patient states she has not taken her psychiatric medications because she could not pick them up from the pharmacy  She denies current hallucinations, but states that her mother does not like her and wants her out of the house       Review of Systems:    Review of Systems Constitutional: Positive for fever  Negative for chills  HENT: Negative for ear pain and sore throat  Eyes: Negative for pain and visual disturbance  Respiratory: Negative for cough and shortness of breath  Cardiovascular: Negative for chest pain and palpitations  Gastrointestinal: Negative for abdominal distention, abdominal pain, constipation, diarrhea, nausea and vomiting  Patient initially denied pain on examination  Patient stated she started to feel a throbbing pain in her epigastric area  Genitourinary: Negative for dysuria and hematuria  Musculoskeletal: Negative for arthralgias and back pain  Skin: Negative for color change and rash  Neurological: Positive for dizziness  Negative for syncope  Psychiatric/Behavioral: Negative for hallucinations  All other systems reviewed and are negative        Past Medical and Surgical History:     Past Medical History:   Diagnosis Date    Anxiety     Arthritis     Back pain at L4-L5 level     Schreiber esophagus     Bulimia     Colon polyp     Disease of thyroid gland     hypothyroid    Ear problems     GERD (gastroesophageal reflux disease)     History of transfusion     Hyperlipidemia     Hypothyroidism     Leukopenia     Nasal congestion     NMS (neuroleptic malignant syndrome) 01/03/2020    Pneumonia     Rheumatoid arthritis involving right hip (HCC)     Sciatica     Seizure ([address] )     due to medication mix up 8/2018 only one historically    Seizures ([address]     Synovial cyst of lumbar spine     Vertigo     Weight gain        Past Surgical History:   Procedure Laterality Date    BONE MARROW BIOPSY      BREAST SURGERY      enlargement     CLOSED REDUCTION DISTAL FEMUR FRACTURE      COLONOSCOPY      COSMETIC SURGERY      HIP ARTHROPLASTY Right 1/2/2020    Procedure: REVISION TOTAL HIP ARTHROPLASTY WITH REPAIR OF PARIPROSTHETIC FRACTURE - POSTERIOR APPROACH;  Surgeon: Tonya Strong MD;  [facility]; Service: Orthopedics    NE ESOPHAGOGASTRODUODENOSCOPY TRANSORAL DIAGNOSTIC N/A 5/11/2021    Procedure: ESOPHAGOGASTRODUODENOSCOPY (EGD); Surgeon: Dante Kirby MD;  [facility];  Service: General    NE ESOPHAGUS SURG PROCEDURE UNLISTED N/A 5/11/2021    Procedure: FUNDOPLICATION TRANSORAL INCISIONLESS;  Surgeon: Gabriele Joshi MD;  [facility];  Service: Gastroenterology    NE LAP, REPAIR PARAESOPHAGEAL HERNIA, INCL FUNDOPLASTY W/ MESH N/A 5/11/2021    Procedure: REPAIR HERNIA PARAESOPHAGEAL  LAPAROSCOPIC;  Surgeon: Dante Kirby MD;  [facility];  Service: General    NE TOTAL HIP ARTHROPLASTY Right 12/11/2019    Procedure: ARTHROPLASTY HIP TOTAL ANTERIOR;  Surgeon: Jillian Wilcox MD;  [facility];  Service: Orthopedics    RHINOPLASTY      SINUS SURGERY         Meds/Allergies:    Prior to Admission medications    Medication Sig Start Date End Date Taking?  Authorizing Provider   acetaminophen (TYLENOL) 325 mg tablet Take 3 tablets (975 mg total) by mouth every 8 (eight) hours  Patient not taking: Reported on 5/19/2021 5/14/21   ABENA Vazquez   aspirin (ECOTRIN LOW STRENGTH) 81 mg EC tablet Take 1 tablet (81 mg total) by mouth daily  Patient not taking: Reported on 5/19/2021 5/15/21   ABENA Vazquez   atorvastatin (LIPITOR) 40 mg tablet Take 1 tablet (40 mg total) by mouth every evening  Patient not taking: Reported on 5/19/2021 5/14/21   ABENA Vazquez   ferrous sulfate 325 (65 Fe) mg tablet Take 1 tablet (325 mg total) by mouth 2 (two) times a day with meals  Patient not taking: Reported on 5/19/2021 11/23/20   Rosanne Crsos PA-C   levothyroxine 25 mcg tablet Take 1 tablet (25 mcg total) by mouth daily 5/19/21   Lashay Watkins MD   LORazepam (ATIVAN) 2 mg tablet Take 1 tablet (2 mg total) by mouth every 6 (six) hours as needed for anxiety  Patient not taking: Reported on 5/19/2021 4/16/21   Lashay Watkins MD   meclizine (ANTIVERT) 25 mg tablet Take 1 tablet (25 mg total) by mouth 3 (three) times a day as needed for dizziness  Patient not taking: Reported on 5/19/2021 3/30/20   Avtar Cooper MD   Multiple Vitamin (MULTIVITAMIN) capsule Take 1 capsule by mouth daily  Patient not taking: Reported on 5/19/2021 11/14/19   Kate Vyas PA-C   ondansetron (ZOFRAN-ODT) 4 mg disintegrating tablet Take 1 tablet (4 mg total) by mouth every 6 (six) hours as needed for nausea or vomiting  Patient not taking: Reported on 5/19/2021 5/14/21   ABENA Metzger   oxyCODONE (ROXICODONE) 5 mg immediate release tablet Take 1 tablet (5 mg total) by mouth every 4 (four) hours as needed for moderate pain for up to 10 daysMax Daily Amount: 30 mg  Patient not taking: Reported on 5/19/2021 5/14/21 5/24/21  ABENA Metzger   pantoprazole (PROTONIX) 40 mg tablet Take 1 tablet (40 mg total) by mouth 2 (two) times a day before meals for 14 days  Patient not taking: Reported on 5/19/2021 5/14/21 5/28/21  ABENA Metzger   pantoprazole (PROTONIX) 40 mg tablet Take 1 tablet (40 mg total) by mouth 2 (two) times a day 5/18/21   Chris Khan MD   PARoxetine (PAXIL) 30 mg tablet Take 2 tablets (60 mg total) by mouth daily  Patient not taking: Reported on 5/19/2021 12/18/20   Avtar Cooper MD   QUEtiapine (SEROquel) 400 MG tablet Take 1 tablet (400 mg total) by mouth daily at bedtime 5/19/21   Avtar Cooper MD   sucralfate (CARAFATE) 1 g/10 mL suspension Take 10 mL (1 g total) by mouth 4 (four) times a day for 14 days  Patient not taking: Reported on 5/19/2021 5/14/21 5/28/21  ABENA Metzger   triamcinolone (KENALOG) 0 1 % cream Apply 1 application topically 2 (two) times a day To affected area  Patient not taking: Reported on 5/19/2021 4/12/21   Avtar Cooper MD   ziprasidone (GEODON) 80 mg capsule Take 1 capsule (80 mg total) by mouth daily 5/19/21   Avtar Cooper MD     I have reviewed home medications with patient personally      Allergies: Allergies   Allergen Reactions    Risperdal [Risperidone] Shortness Of Breath     Rapid heart beat, SOB    Zyprexa [Olanzapine] Shortness Of Breath     Rapid heartbeat    Latex Rash     Band aids, adhesives wears normal underwear, can eat bananas  USE PAPER TAPE       Social History:     Marital Status: Single   Occupation: Unemployed  Patient Pre-hospital Living Situation: Lives with mother     Patient Pre-hospital Level of Mobility: Patient without mobility restrictions; ambulatory without assistive devises   Patient Pre-hospital Diet Restrictions: Full liquid diet; able to progress to puree if tolerated  Substance Use History:   Social History     Substance and Sexual Activity   Alcohol Use Never    Frequency: Never    Binge frequency: Never     Social History     Tobacco Use   Smoking Status Never Smoker   Smokeless Tobacco Never Used     Social History     Substance and Sexual Activity   Drug Use No       Family History:    Family History   Problem Relation Age of Onset    Uterine cancer Mother     Esophageal cancer Father     Colon cancer Maternal Grandmother        Physical Exam:     Vitals:   Blood Pressure: 130/69 (05/20/21 1430)  Pulse: 104 (05/20/21 1430)  Temperature: 100 4 °F (38 °C) (05/20/21 1013)  Temp Source: Rectal (05/20/21 1013)  Respirations: 18 (05/20/21 1430)  Weight - Scale: 72 6 kg (160 lb 0 9 oz) (05/20/21 1013)  SpO2: 98 % (05/20/21 1430)    Physical Exam  Vitals signs and nursing note reviewed  Constitutional:       General: She is not in acute distress  Appearance: She is well-developed  HENT:      Head: Normocephalic and atraumatic  Nose: Nose normal  No congestion or rhinorrhea  Eyes:      Extraocular Movements: Extraocular movements intact  Conjunctiva/sclera: Conjunctivae normal       Comments: Pinpoint pupils minimally reactive to light   Neck:      Musculoskeletal: Neck supple  Cardiovascular:      Rate and Rhythm: Normal rate and regular rhythm  Heart sounds: No murmur  Pulmonary:      Effort: Pulmonary effort is normal  No respiratory distress  Breath sounds: Normal breath sounds  Abdominal:      Palpations: Abdomen is soft  Tenderness: There is no abdominal tenderness  Musculoskeletal:      Right lower leg: No edema  Left lower leg: No edema  Skin:     General: Skin is warm and dry  Neurological:      General: No focal deficit present  Mental Status: She is alert and oriented to person, place, and time  Cranial Nerves: No cranial nerve deficit  Psychiatric:         Attention and Perception: She does not perceive auditory or visual hallucinations  Thought Content: Thought content is delusional  Thought content does not include homicidal or suicidal plan  Comments: Patient endorses her mother hates her and does not want her in the house             Additional Data:     Lab Results: I have personally reviewed pertinent reports  Results from last 7 days   Lab Units 05/20/21  1026   WBC Thousand/uL 7 96   HEMOGLOBIN g/dL 10 9*   HEMATOCRIT % 33 1*   PLATELETS Thousands/uL 367   NEUTROS PCT % 82*   LYMPHS PCT % 9*   MONOS PCT % 7   EOS PCT % 1     Results from last 7 days   Lab Units 05/20/21  1026   SODIUM mmol/L 135*   POTASSIUM mmol/L 3 0*   CHLORIDE mmol/L 98*   CO2 mmol/L 27   BUN mg/dL 11   CREATININE mg/dL 0 79   ANION GAP mmol/L 10   CALCIUM mg/dL 10 0   ALBUMIN g/dL 3 3*   TOTAL BILIRUBIN mg/dL 0 28   ALK PHOS U/L 85   ALT U/L 36   AST U/L 21   GLUCOSE RANDOM mg/dL 123     Results from last 7 days   Lab Units 05/20/21  1026   INR  0 98             Results from last 7 days   Lab Units 05/20/21  1217 05/20/21  1026   LACTIC ACID mmol/L 1 9 3 4*   PROCALCITONIN ng/ml  --  0 08       Imaging: I have personally reviewed pertinent reports        PE Study with CT abdomen &pelvis with contrast   Final Result by Sigifredo Briseno MD (05/20 1230)      CTA chest:      No central through proximal segmental pulmonary embolus  Limited evaluation of the peripheral pulmonary arteries  Inferior lingular atelectasis and dependent hypoventilatory changes in the upper and lower lobes  Minimal fluid in diffusely patulous esophagus attributed to gastroesophageal reflux  Otherwise, no evidence of acute thoracic process  CT abdomen and pelvis:      No evidence of acute abdominopelvic process  Thickening at the GE junction attributed to recent hiatal hernia repair  Workstation performed: [id number]             EKG, Pathology, and Other Studies Reviewed on Admission:   · EKG: No change from previous EKG; NSR    Allscripts / Epic Records Reviewed: Yes     ** Please Note: This note has been constructed using a voice recognition system   **

## 2021-05-21 ENCOUNTER — PATIENT OUTREACH (OUTPATIENT)
Dept: INTERNAL MEDICINE CLINIC | Facility: CLINIC | Age: 61
End: 2021-05-21

## 2021-05-21 PROBLEM — R65.10 SIRS (SYSTEMIC INFLAMMATORY RESPONSE SYNDROME) (HCC): Status: ACTIVE | Noted: 2021-05-20

## 2021-05-21 LAB
ATRIAL RATE: 121 BPM
LACTATE SERPL-SCNC: 1.6 MMOL/L (ref 0.5–2)
P AXIS: 45 DEGREES
PR INTERVAL: 126 MS
QRS AXIS: 18 DEGREES
QRSD INTERVAL: 82 MS
QT INTERVAL: 320 MS
QTC INTERVAL: 454 MS
T WAVE AXIS: 73 DEGREES
VENTRICULAR RATE: 121 BPM

## 2021-05-21 PROCEDURE — 83605 ASSAY OF LACTIC ACID: CPT | Performed by: INTERNAL MEDICINE

## 2021-05-21 PROCEDURE — 93010 ELECTROCARDIOGRAM REPORT: CPT | Performed by: INTERNAL MEDICINE

## 2021-05-21 PROCEDURE — 97167 OT EVAL HIGH COMPLEX 60 MIN: CPT

## 2021-05-21 PROCEDURE — 84255 ASSAY OF SELENIUM: CPT | Performed by: INTERNAL MEDICINE

## 2021-05-21 PROCEDURE — 97162 PT EVAL MOD COMPLEX 30 MIN: CPT

## 2021-05-21 PROCEDURE — 99232 SBSQ HOSP IP/OBS MODERATE 35: CPT | Performed by: PHYSICIAN ASSISTANT

## 2021-05-21 PROCEDURE — 92610 EVALUATE SWALLOWING FUNCTION: CPT

## 2021-05-21 RX ADMIN — PANTOPRAZOLE SODIUM 40 MG: 40 TABLET, DELAYED RELEASE ORAL at 18:08

## 2021-05-21 RX ADMIN — LEVOTHYROXINE SODIUM 25 MCG: 25 TABLET ORAL at 06:03

## 2021-05-21 RX ADMIN — ACETAMINOPHEN 975 MG: 325 TABLET, FILM COATED ORAL at 22:19

## 2021-05-21 RX ADMIN — CEFTRIAXONE SODIUM 1000 MG: 10 INJECTION, POWDER, FOR SOLUTION INTRAVENOUS at 13:38

## 2021-05-21 RX ADMIN — QUETIAPINE FUMARATE 400 MG: 100 TABLET ORAL at 22:19

## 2021-05-21 RX ADMIN — FERROUS SULFATE TAB 325 MG (65 MG ELEMENTAL FE) 325 MG: 325 (65 FE) TAB at 09:55

## 2021-05-21 RX ADMIN — ATORVASTATIN CALCIUM 40 MG: 40 TABLET, FILM COATED ORAL at 18:08

## 2021-05-21 RX ADMIN — PANTOPRAZOLE SODIUM 40 MG: 40 TABLET, DELAYED RELEASE ORAL at 06:03

## 2021-05-21 RX ADMIN — SUCRALFATE 1 G: 1 TABLET ORAL at 13:38

## 2021-05-21 RX ADMIN — DOCUSATE SODIUM 100 MG: 100 CAPSULE, LIQUID FILLED ORAL at 18:09

## 2021-05-21 RX ADMIN — DOCUSATE SODIUM 100 MG: 100 CAPSULE, LIQUID FILLED ORAL at 09:56

## 2021-05-21 RX ADMIN — ACETAMINOPHEN 975 MG: 325 TABLET, FILM COATED ORAL at 06:03

## 2021-05-21 RX ADMIN — SENNOSIDES 8.6 MG: 8.6 TABLET ORAL at 09:56

## 2021-05-21 RX ADMIN — ZIPRASIDONE HYDROCHLORIDE 80 MG: 40 CAPSULE ORAL at 09:55

## 2021-05-21 RX ADMIN — SUCRALFATE 1 G: 1 TABLET ORAL at 06:03

## 2021-05-21 RX ADMIN — SUCRALFATE 1 G: 1 TABLET ORAL at 22:19

## 2021-05-21 RX ADMIN — ASPIRIN 81 MG: 81 TABLET, COATED ORAL at 09:56

## 2021-05-21 RX ADMIN — ENOXAPARIN SODIUM 40 MG: 40 INJECTION SUBCUTANEOUS at 09:56

## 2021-05-21 RX ADMIN — FERROUS SULFATE TAB 325 MG (65 MG ELEMENTAL FE) 325 MG: 325 (65 FE) TAB at 18:08

## 2021-05-21 RX ADMIN — ACETAMINOPHEN 975 MG: 325 TABLET, FILM COATED ORAL at 13:38

## 2021-05-21 RX ADMIN — SUCRALFATE 1 G: 1 TABLET ORAL at 18:08

## 2021-05-21 RX ADMIN — PAROXETINE HYDROCHLORIDE 60 MG: 20 TABLET, FILM COATED ORAL at 09:55

## 2021-05-21 NOTE — PLAN OF CARE
Problem: Potential for Falls  Goal: Patient will remain free of falls  Description: INTERVENTIONS:  - Assess patient frequently for physical needs  -  Identify cognitive and physical deficits and behaviors that affect risk of falls    -  [place] fall precautions as indicated by assessment   - Educate patient/family on patient safety including physical limitations  - Instruct patient to call for assistance with activity based on assessment  - Modify environment to reduce risk of injury  - Consider OT/PT consult to assist with strengthening/mobility  Outcome: Progressing

## 2021-05-21 NOTE — CASE MANAGEMENT
LOS: 1   Not a bundle  Re-admission risk: yellow  Pt is a < 30 day re-admission  Pt admitted to [facility] on 5/20/21  Due to the patient's high risk of readmission, "Access to Care" and "Care Now Facility" sheets were explained and given to the patient and/or caregiver  CM met with pt at bedside in order to introduce self and CM role  CM assessment completed  Pt currently resides with her 80year old mother who patient reports she assist with care as needed  Pt reports she resides in a ranch style home with 1 FAMILIA  Pt reports she was independent with care prior to admission  Pt has DME which includes a cane, unused however available if needed  Pt is currently open with [facility] for nurse visit and MSW  Pt requests resumption of care upon d/c  Per chart review, pt has a dx of schizoaffective disorder  Pt reports she is seeking a new psychiatrist at this time, due to prior psychiatrists Nell Sanders retiring  CM offered list of OP mental health resources  Pt accepted  No D&A tx hx reported  Preferred pharmacy is [facility] located in [place]  PCP is Michelle Heart [phone]  Pt reports no formal POA/LW at this time  Main emergency contact is pt's mother Kimani Rhoades [facility]: [phone]  Pt will require Lyft ride upon D/C  CM received in-basket message from OP CM: Davide Pérez requesting follow up with patient regarding Meals on wheels services  CM will follow with pt as requested  Atime later CM received TC from pts SW through [facility]  Clara Joyner regarding D/C plan for pt  MSW requested CM assist pt with scheduling psychiatry appointment  CM informed of [address] list provided to pt  MSW provided update on patients living condition and possible food insecurities  CM was informed pt currently has food in the home per yesterday's home visit  CM followed with pt who confirmed same  CM placed referral to [facility]  For resumption of care SN/MSW  No pt/ot recommendation's at this time   SL-HCC added to follow up providers  CM reviewed d/c planning process including the following: identifying help at home, patient preference for d/c planning needs, Discharge Lounge, [facility] Meds to Bed program, availability of treatment team to discuss questions or concerns patient and/or family may have regarding understanding medications and recognizing signs and symptoms once discharged  CM also encouraged patient to follow up with all recommended appointments after discharge  Patient advised of importance for patient and family to participate in managing patients medical well being  CM department will continue to follow pt through d/c

## 2021-05-21 NOTE — ASSESSMENT & PLAN NOTE
- Patient is s/p Hiatal hernia repair (5/11) performed at [facility] by Dr Nallely Viera  -patient endorses she is supposed to be on a pureed diet since her surgery for the next 8 weeks, but states she has only been eating bread and water  -GI stated patient has not returned phone calls for follow up  -Denies current abdominal complaints  -consult to general surgery appreciated   -Initiate clear liquid diet/ nutrition supplementation as needed

## 2021-05-21 NOTE — UTILIZATION REVIEW
Initial Clinical Review    Admission: Date/Time/Statement:   Admission Orders (From admission, onward)     Ordered        05/20/21 1328  Inpatient Admission  Once                   Orders Placed This Encounter   Procedures    Inpatient Admission     Standing Status:   Standing     Number of Occurrences:   1     Order Specific Question:   Level of Care     Answer:   Med Surg [16]     Order Specific Question:   Estimated length of stay     Answer:   More than 2 Midnights     Order Specific Question:   Certification     Answer:   I certify that inpatient services are medically necessary for this patient for a duration of greater than two midnights  See H&P and MD Progress Notes for additional information about the patient's course of treatment  ED Arrival Information     Expected Arrival Acuity Means of Arrival Escorted By Service Admission Type    - 5/20/2021 10:06 Urgent Ambulance [facility] Hospitalist Urgent    Arrival Complaint    SOB        Chief Complaint   Patient presents with    Shortness of Breath     Patient states that she woke up this morning and started feeling increase SOB with tachycardia and sweating  Initial Presentation:61year old female, presented to the ED @ [facility] from home via EMS  Admitted as Inpatient due to SIRS/Sepsis  PMH of s/p hiatal hernia repair (5/11), schizoaffective disorder  HTN, HLD, GERD  Date: 05/20/2021  Worsening SOB over the past 3 days  Tachycardia 122, RR 24, Lactic acid 3 4, fever 100 9  CTA (5/20) revealed no PE, inferior lingula atelectasis; concerns for PNA (HAP/viral)  Obtain blood cultures  Urine pending  Continue to monitor O2 sat  Trend WBC and fever curve  Start IV Abx  VTE Pharmacologic Prophylaxis:   Pharmacologic: Enoxaparin (Lovenox)  Mechanical VTE Prophylaxis in Place: Yes    Day 2: 05/21/2021  Unclear etiology  Blood cultures pending  DC IV Abx, observe off abx  Trend WBC        ED Triage Vitals Temperature Pulse Respirations Blood Pressure SpO2   05/20/21 1013 05/20/21 1013 05/20/21 1013 05/20/21 1013 05/20/21 1013   100 4 °F (38 °C) (!) 121 (!) 24 138/61 95 %      Temp Source Heart Rate Source Patient Position - Orthostatic VS BP Location FiO2 (%)   05/20/21 1013 05/20/21 1013 05/20/21 1013 05/20/21 1013 --   Rectal Monitor Lying Left arm       Pain Score       05/20/21 1555       No Pain          Wt Readings from Last 1 Encounters:   05/20/21 72 6 kg (160 lb 0 9 oz)     Additional Vital Signs:   Date/Time  Temp  Pulse  Resp  BP  MAP (mmHg)  SpO2  O2 Device  Patient Position - Orthostatic VS   05/21/21 15:54:53  98 3 °F (36 8 °C)  65  --  136/73  94  97 %  --  --   05/21/21 0955  --  --  --  --  --  99 %  --  --   05/21/21 07:50:58  98 6 °F (37 °C)  95  16  120/75  90  96 %  --  --   05/21/21 03:48:08  97 4 °F (36 3 °C)Abnormal   78  17  102/67  79  96 %  --  --   05/20/21 20:53:08  97 4 °F (36 3 °C)Abnormal   95  17  128/74  92  98 %  --  --   05/20/21 15:49:58  98 3 °F (36 8 °C)  98  17  131/70  90  97 %  --  --   05/20/21 1430  --  104  18  130/69  86  98 %  --  --   05/20/21 1415  --  104  20  137/63  88  95 %  --  --   05/20/21 1200  --  110Abnormal   22  106/61  77  98 %  --  --   05/20/21 1100  --  124Abnormal   --  117/55  79  93 %  --  --     Date and Time Eye Opening Best Verbal Response Best Motor Response [place] Coma Scale Score   05/21/21 0955 4 5 6 15   05/20/21 1139 4 5 6 15     05/20/2021 @ 1206  CT chest/abd/pel:  CTA chest:  No central through proximal segmental pulmonary embolus  Limited evaluation of the peripheral pulmonary arteries  Inferior lingular atelectasis and dependent hypoventilatory changes in the upper and lower lobes  Minimal fluid in diffusely patulous esophagus attributed to gastroesophageal reflux  Otherwise, no evidence of acute thoracic process  CT abdomen and pelvis:   No evidence of acute abdominopelvic process    Thickening at the GE junction attributed to recent hiatal hernia repair  2021 @ 1030  EC, Sinus Tachycardia    Pertinent Labs/Diagnostic Test Results:   Results from last 7 days   Lab Units 21  1026   SARS-COV-2  Negative     Results from last 7 days   Lab Units 21  1026   WBC Thousand/uL 7 96   HEMOGLOBIN g/dL 10 9*   HEMATOCRIT % 33 1*   PLATELETS Thousands/uL 367   NEUTROS ABS Thousands/µL 6 54     Results from last 7 days   Lab Units 21  1026   SODIUM mmol/L 135*   POTASSIUM mmol/L 3 0*   CHLORIDE mmol/L 98*   CO2 mmol/L 27   ANION GAP mmol/L 10   BUN mg/dL 11   CREATININE mg/dL 0 79   EGFR ml/min/1 73sq m 82   CALCIUM mg/dL 10 0   MAGNESIUM mg/dL 1 5*   PHOSPHORUS mg/dL 1 8*     Results from last 7 days   Lab Units 21  1026   AST U/L 21   ALT U/L 36   ALK PHOS U/L 85   TOTAL PROTEIN g/dL 6 5   ALBUMIN g/dL 3 3*   TOTAL BILIRUBIN mg/dL 0 28     Results from last 7 days   Lab Units 21  1026   GLUCOSE RANDOM mg/dL 123     Results from last 7 days   Lab Units 21  1026   CK TOTAL U/L 215*   CK MB INDEX % 2 1   CK MB ng/mL 4 5     Results from last 7 days   Lab Units 21  1026   TROPONIN I ng/mL <0 02     Results from last 7 days   Lab Units 21  1026   PROTIME seconds 13 0   INR  0 98   PTT seconds 26     Results from last 7 days   Lab Units 21  1026   PROCALCITONIN ng/ml 0 08     Results from last 7 days   Lab Units 21  0657 21  1217 21  1026   LACTIC ACID mmol/L 1 6 1 9 3 4*     Results from last 7 days   Lab Units 21  1159   CLARITY UA  Clear   COLOR UA  Yellow   SPEC GRAV UA  1 010   PH UA  5 5   GLUCOSE UA mg/dl Negative   KETONES UA mg/dl Negative   BLOOD UA  Negative   PROTEIN UA mg/dl Negative   NITRITE UA  Negative   BILIRUBIN UA  Negative   UROBILINOGEN UA E U /dl 0 2   LEUKOCYTES UA  Negative     Results from last 7 days   Lab Units 21  1026   BLOOD CULTURE  No Growth at 24 hrs  No Growth at 24 hrs       ED Treatment:   Medication Administration from 05/20/2021 1006 to 05/20/2021 1458       Date/Time Order Dose Route Action     05/20/2021 1025 sodium chloride 0 9 % bolus 1,000 mL 1,000 mL Intravenous New Bag     05/20/2021 1025 sodium chloride 0 9 % bolus 1,000 mL 1,000 mL Intravenous New Bag     05/20/2021 1142 iohexol (OMNIPAQUE) 350 MG/ML injection (MULTI-DOSE) 100 mL 100 mL Intravenous Given     05/20/2021 1217 ceftriaxone (ROCEPHIN) 1 g/50 mL in dextrose IVPB 1,000 mg Intravenous New Bag        Past Medical History:   Diagnosis Date    Anxiety     Arthritis     Back pain at L4-L5 level     Schreiber esophagus     Bulimia     Colon polyp     Disease of thyroid gland     hypothyroid    Ear problems     GERD (gastroesophageal reflux disease)     History of transfusion     Hyperlipidemia     Hypothyroidism     Leukopenia     Nasal congestion     NMS (neuroleptic malignant syndrome) 01/03/2020    Pneumonia     Rheumatoid arthritis involving right hip (HCC)     Sciatica     Seizure ([facility] [address] )     due to medication mix up 8/2018 only one historically    Seizures ([facility] [address] [address] )     Synovial cyst of lumbar spine     Vertigo     Weight gain      Present on Admission:   Essential hypertension   Hiatal hernia with gastroesophageal reflux disease and esophagitis      Admitting Diagnosis: Lumbar radiculopathy [M54 16]  Pneumonia [J18 9]  SOB (shortness of breath) [R06 02]  DDD (degenerative disc disease), lumbar [M51 36]  Spondylolisthesis at L4-L5 level [M43 16]  Localized osteoporosis with current pathological fracture with routine healing [M80 80XD]  Age/Sex: 61 y o  female  Admission Orders:  Scheduled Medications:  acetaminophen, 975 mg, Oral, Q8H [address]  aspirin, 81 mg, Oral, Daily  atorvastatin, 40 mg, Oral, QPM  docusate sodium, 100 mg, Oral, BID  enoxaparin, 40 mg, Subcutaneous, Daily  ferrous sulfate, 325 mg, Oral, BID With Meals  levothyroxine, 25 mcg, Oral, Early Morning  multi-electrolyte, 1,000 mL, Intravenous, Once    Followed by  multi-electrolyte, 1,000 mL, Intravenous, Once  pantoprazole, 40 mg, Oral, BID AC  PARoxetine, 60 mg, Oral, Daily  QUEtiapine, 400 mg, Oral, HS  senna, 1 tablet, Oral, Daily  sucralfate, 1 g, Oral, 4x Daily (AC & HS)  ziprasidone, 80 mg, Oral, Daily  ceftriaxone (ROCEPHIN) 1 g/50 mL in dextrose IVPB   Dose: 1,000 mg  Freq: Once Route: IV  Last Dose: Stopped (05/20/21 1356)  Start: 05/20/21 1200 End: 05/20/21 1356  cefTRIAXone (ROCEPHIN) 1,000 mg in dextrose 5 % 50 mL IVPB   Dose: 1,000 mg  Freq: Every 24 hours Route: IV  Last Dose: 1,000 mg (05/21/21 1338)  Start: 05/21/21 1200 End: 05/21/21 1615    Continuous IV Infusions:     PRN Meds:  aluminum-magnesium hydroxide-simethicone, 30 mL, Oral, Q6H PRN  LORazepam, 2 mg, Oral, Q6H PRN  meclizine, 25 mg, Oral, TID PRN  ondansetron, 4 mg, Intravenous, Q6H PRN  oxyCODONE, 5 mg, Oral, Q4H PRN      Full liquid surgical diet  Romel SCDs  IP CONSULT TO PSYCHIATRY    Network Utilization Review Department  ATTENTION: Please call with any questions or concerns to [phone] and carefully listen to the prompts so that you are directed to the right person  All voicemails are confidential   Randy Negron all requests for admission clinical reviews, approved or denied determinations and any other requests to dedicated fax number below belonging to the campus where the patient is receiving treatment   List of dedicated fax numbers for the Facilities:  [address] DENIALS (Administrative/Medical Necessity) [phone]   [address] (Maternity/NICU/Pediatrics) 261 [facility] [address] Christine Anderson [phone] [address] [facility] Vaughn Boss 1481 [address] [place] [address] [phone]

## 2021-05-21 NOTE — OCCUPATIONAL THERAPY NOTE
Occupational Therapy Evaluation      Brian Francel    5/21/2021    Principal Problem:    Sepsis ([address] )  Active Problems:    Schizoaffective disorder ([facility] [address] )    Essential hypertension    Hiatal hernia with gastroesophageal reflux disease and esophagitis    Hyperlipidemia      Past Medical History:   Diagnosis Date    Anxiety     Arthritis     Back pain at L4-L5 level     Schreiber esophagus     Bulimia     Colon polyp     Disease of thyroid gland     hypothyroid    Ear problems     GERD (gastroesophageal reflux disease)     History of transfusion     Hyperlipidemia     Hypothyroidism     Leukopenia     Nasal congestion     NMS (neuroleptic malignant syndrome) 01/03/2020    Pneumonia     Rheumatoid arthritis involving right hip (HCC)     Sciatica     Seizure ([facility] [address] [address] )     due to medication mix up 8/2018 only one historically    Seizures ([facility] [address] )     Synovial cyst of lumbar spine     Vertigo     Weight gain        Past Surgical History:   Procedure Laterality Date    BONE MARROW BIOPSY      BREAST SURGERY      enlargement     CLOSED REDUCTION DISTAL FEMUR FRACTURE      COLONOSCOPY      COSMETIC SURGERY      HIP ARTHROPLASTY Right 1/2/2020    Procedure: REVISION TOTAL HIP ARTHROPLASTY WITH REPAIR OF PARIPROSTHETIC FRACTURE - POSTERIOR APPROACH;  Surgeon: Chana Vargas MD;  Location: [facility];  Service: Orthopedics    OK ESOPHAGOGASTRODUODENOSCOPY TRANSORAL DIAGNOSTIC N/A 5/11/2021    Procedure: ESOPHAGOGASTRODUODENOSCOPY (EGD); Surgeon: Chris Khan MD;  Location: [facility];  Service: General    OK ESOPHAGUS SURG PROCEDURE UNLISTED N/A 5/11/2021    Procedure: FUNDOPLICATION TRANSORAL INCISIONLESS;  Surgeon: Saravanan Llanos MD;  Location[facility];  Service: Gastroenterology    OK LAP, REPAIR PARAESOPHAGEAL HERNIA, INCL FUNDOPLASTY W/ MESH N/A 5/11/2021    Procedure: REPAIR HERNIA PARAESOPHAGEAL  LAPAROSCOPIC;  Surgeon:  Chris Khan MD;  [facility];  Service: [facility] MT TOTAL HIP ARTHROPLASTY Right 12/11/2019    Procedure: ARTHROPLASTY HIP TOTAL ANTERIOR;  Surgeon: Santos Guzman MD;  [facility];  Service: Orthopedics    RHINOPLASTY      SINUS SURGERY          05/21/21 0906   OT Last Visit   OT Visit Date 05/21/21   Note Type   Note type Evaluation   Restrictions/Precautions   Weight Bearing Precautions Per Order No   Other Precautions Chair Alarm; Bed Alarm;O2   Pain Assessment   Pain Assessment Tool 0-10   Pain Score No Pain   Home Living   Type of [address] One level  (2 FAMILIA)   Bathroom Shower/Tub Tub/shower unit   Bathroom Toilet Standard   Home Equipment   (denies use of DME at baseline)   Additional Comments pt lives w her elderly mom in a one story home   reports being the caretaker of her mom   Prior Function   Level of [place] Independent with ADLs and functional mobility   Lives With Family   ADL Assistance Independent   IADLs Independent   Falls in the last 6 months 0   Comments +   Lifestyle   Autonomy pt reports being independent w self care, mobility, driving and shopping at baseline   Reciprocal Relationships lives w elderly mom   Intrinsic Gratification her cat   Psychosocial   Psychosocial (WDL) WDL   Subjective   Subjective "I am not my usual self, I don't have the energy to go shopping"   ADL   Eating Assistance 7  [address] 7  Independent   UB Bathing Deficit Increased time to complete   LB Bathing Assistance 5  Supervision/Setup   LB Bathing Deficit Increased time to complete   UB Dressing Assistance 7  Independent   LB Dressing Assistance 7  Independent   Toileting Assistance  6  Modified independent   Additional Comments pt demonstrated ability to complete all self care by herself, increased time, and doing so from seated position   Bed Mobility   Supine to Sit 6  Modified independent   Additional Comments Good balance sitting at EOB   Transfers   Sit to Stand 6  Modified independent   Stand to Sit 6  Modified independent   Stand pivot 6  Modified independent   Toilet transfer 6  Modified independent   Functional Mobility   Functional Mobility 5  Supervision   Additional Comments walks around the floor/unit, no use of  AD   Balance   Static Sitting Good   Dynamic Sitting Good   Static Standing Fair +   Dynamic Standing Fair +   Activity Tolerance   Activity Tolerance Patient tolerated treatment well   Medical Staff Made Aware CM, RN, PT Jasper Levin   RUE Assessment   RUE Assessment WFL   LUE Assessment   LUE Assessment WFL   Hand Function   Gross Motor Coordination Functional   Fine Motor Coordination Functional   Sensation   Light Touch No apparent deficits   Cognition   Arousal/Participation Alert; Cooperative   Attention Within functional limits   Orientation Level Oriented X4   Memory Within functional limits   Following Commands Follows all commands and directions without difficulty   Assessment   Assessment Pt is a 61 y o  female seen for OT evaluation s/p admit to [facility] on 5/20/2021 w/ Sepsis ([address] )  Pt initially hospitalized for hiatal hernia repair on 5/11  Comorbidities affecting pt's functional performance at time of assessment include: scizoaffective disorder, HTN, sepsis, lumbar radiculopathy, DDD, osteoporosis, hx T12 compression fx, anxiety  Personal factors affecting pt at time of IE include:steps to enter environment, limited home support and behavioral pattern  Prior to admission, pt was living w her mom in a one story home, 2 FAMILIA, being independent w all self care, mobility, driving etc    Upon evaluation: Pt demonstrates ability to care for self, able to walk the hallway w no use of DME/AD, no loss of balance  Pt on 1L O2 NC, sats 100%  Took it off for OT session, able to walk 350feet, sats at 96%  RN aware     Based on findings from OT evaluation and functional performance deficits, pt has been identified as a  high complexity evaluation  The patient's raw score on the AM-PAC Daily Activity inpatient short form is 24, standardized score is 57 54, greater than 39 4  Patients at this level are likely to benefit from discharge to home  From OT standpoint, recommendation at time of d/c would be home w no ongoing OT needs  No OT needs identified for this acute care stay  DC OT at this time       Goals   Patient Goals to get better   Plan   OT Frequency Eval only   Recommendation   OT Discharge Recommendation No rehabilitation needs   OT - OK to Discharge Yes   AM-PAC Daily Activity Inpatient   Lower Body Dressing 4   Bathing 4   Toileting 4   Upper Body Dressing 4   Grooming 4   Eating 4   Daily Activity Raw Score 24   Daily Activity Standardized Score (Calc for Raw Score >=11) 57 54   Barthel Index   Feeding 10   Bathing 5   Grooming Score 5   Dressing Score 10   Bladder Score 10   Bowels Score 10   Toilet Use Score 10   Transfers (Bed/Chair) Score 15   Mobility (Level Surface) Score 15   Stairs Score 5   Barthel Index Score 95

## 2021-05-21 NOTE — ASSESSMENT & PLAN NOTE
· Per record review, patient previously on Geodon and Seroquel but she endorsed that she stopped taking her medication 1 week ago because she would not    Outpatient meds were resumed  · Psych consult pending

## 2021-05-21 NOTE — PROGRESS NOTES
Per chart review, patient admitted to [facility] [address]  due to feeling shortness of breath  OPCM SW has been unable to patient to further assess needs and address food insecurity  AAA provided information to patient for Meals on Wheels and encouraged her to call [employer] to set up food delivery services  [facility] sent to [facility] RN and Tania Rogers IPCIVAN SW to provide assistance as able  OPCM SW will continue to follow  _____________    Response received from [address] [place] [address] Dr DAILEY stating she will be following patient  Received call from [facility] Abundio Ceja stating she conducted home visit yesterday and was unaware patient had been taken to B  Nicola Flow verified that patient and patient's mom have food in the home, stated home is cluttered, and patient's mom spoke very poorly about patient  Nicola Flow to outreach Shailesh to further discuss findings from yesterday's home visit and to request psychiatry appt be made for patient with [facility] psychiatrist prior to patient's discharge  Nicola Flow also informed that patient has 50 round-trip rides available to doctor appts through her [facility] [employer]; she will provide this information to patient  OPCM SW will continue following

## 2021-05-21 NOTE — ASSESSMENT & PLAN NOTE
· SIRS vs severe sepsis POA, as evidenced by tachycardia, fever (100 4), and lactic acidosis  · Unclear etiology  · Lactic acidosis resolved  · Blood cultures with no growth x2 after 24 hours  · COVID-19 negative  · CTA chest PE study with CT abdomen and pelvis with contrast on admission with no central through proximal segmental PE, inferior lingular atelectasis and dependent hypoventilatory changes in the upper and lower lobes, minimal fluid in diffusely patulous esophagus attributed to esophageal reflux, otherwise no evidence of acute thoracic process  No evidence of acute abdominal pelvic process  · Procalcitonin negative  Repeat    · UA not indicative of UTI   · Ceftriaxone discontinued

## 2021-05-21 NOTE — PHYSICAL THERAPY NOTE
Physical Therapy Evaluation Note     Patient Name: Edna COYLE Date: 5/21/2021     Problem List  Principal Problem:    Sepsis ([facility] [address] )  Active Problems:    Schizoaffective disorder ([facility] [address] )    Essential hypertension    Hiatal hernia with gastroesophageal reflux disease and esophagitis    Hyperlipidemia       Past Medical History  Past Medical History:   Diagnosis Date    Anxiety     Arthritis     Back pain at L4-L5 level     Schreiber esophagus     Bulimia     Colon polyp     Disease of thyroid gland     hypothyroid    Ear problems     GERD (gastroesophageal reflux disease)     History of transfusion     Hyperlipidemia     Hypothyroidism     Leukopenia     Nasal congestion     NMS (neuroleptic malignant syndrome) 01/03/2020    Pneumonia     Rheumatoid arthritis involving right hip (HCC)     Sciatica     Seizure ([facility] [address] )     due to medication mix up 8/2018 only one historically    Seizures ([facility] [address] )     Synovial cyst of lumbar spine     Vertigo     Weight gain         Past Surgical History  Past Surgical History:   Procedure Laterality Date    BONE MARROW BIOPSY      BREAST SURGERY      enlargement     CLOSED REDUCTION DISTAL FEMUR FRACTURE      COLONOSCOPY      COSMETIC SURGERY      HIP ARTHROPLASTY Right 1/2/2020    Procedure: REVISION TOTAL HIP ARTHROPLASTY WITH REPAIR OF PARIPROSTHETIC FRACTURE - POSTERIOR APPROACH;  Surgeon: Jenn De Los Santos MD;  [facility];  Service: Orthopedics    IL ESOPHAGOGASTRODUODENOSCOPY TRANSORAL DIAGNOSTIC N/A 5/11/2021    Procedure: ESOPHAGOGASTRODUODENOSCOPY (EGD); Surgeon:  Kemi Castillo MD;  [facility];  Service: General    IL ESOPHAGUS SURG PROCEDURE UNLISTED N/A 5/11/2021    Procedure: FUNDOPLICATION TRANSORAL INCISIONLESS;  Surgeon: Rohan Gutierrez MD;  [facility];  Service: Gastroenterology    IL LAP, REPAIR PARAESOPHAGEAL HERNIA, INCL FUNDOPLASTY W/ MESH N/A 5/11/2021    Procedure: REPAIR HERNIA PARAESOPHAGEAL  LAPAROSCOPIC;  Surgeon: Rex Moulton MD;  [facility];  Service: General    ID TOTAL HIP ARTHROPLASTY Right 12/11/2019    Procedure: ARTHROPLASTY HIP TOTAL ANTERIOR;  Surgeon: Jannet Hannon MD;  [facility];  Service: Orthopedics    RHINOPLASTY      SINUS SURGERY        05/21/21 0907   PT Last Visit   PT Visit Date 05/21/21   Note Type   Note type Evaluation   Pain Assessment   Pain Assessment Tool Pain Assessment not indicated - pt denies pain   Pain Score No Pain   Home Living   Type of [address] One level   Additional Comments Pt lives in a [facility] with 2STE  Pt lives with her mother who she cares for  Pt cooks for her mother and does IADL's  Pt was I for ADL's and mobility prior  Pt did not use any DME  Pt drives  Prior Function   Level of [place] Independent with ADLs and functional mobility   Lives With Family   ADL Assistance Independent   IADLs Independent   Falls in the last 6 months 0   Comments +drives   Restrictions/Precautions   Weight Bearing Precautions Per Order No   Other Precautions Chair Alarm; Bed Alarm;Multiple lines, 1L of O2 via NC   General   Family/Caregiver Present No   Cognition   Overall Cognitive Status WFL   Arousal/Participation Alert   Attention Within functional limits   Orientation Level Oriented X4   Memory Within functional limits   RLE Assessment   RLE Assessment WFL   LLE Assessment   LLE Assessment WFL   Bed Mobility   Supine to Sit 6  Modified independent   Additional Comments sitting EOB at a I level   Transfers   Sit to Stand 7  Independent   Stand to Sit 7  Independent   Ambulation/Elevation   Gait pattern WNL   Gait Assistance 7  Independent   Assistive Device None   Distance 20ftx1, [id number]   Stair Management Assistance 5  Supervision   Stair Management Technique One rail R   Number of Stairs 7   Balance   Static Sitting Normal   Dynamic Sitting Good   Static Standing Fair + Dynamic Standing Fair +   Ambulatory Fair +   Endurance Deficit   Endurance Deficit No   Activity Tolerance   Activity Tolerance Patient tolerated treatment well   Medical Staff Made Aware OT   Nurse Made Aware nurse approved therapy session   Assessment   Prognosis Excellent   Problem List Decreased mobility   Assessment Pt is a 60 yo female admitted to [place] [address] on 5/20/2021 s/p SOB  DX: hyperlipidemia, hiatal hernia, hypertension, schizoaffective disorder, sepsis  Two patient identifiers were used to confirm  Pt lives with her mother in a [facility] with 2 FAMILIA  Pt was I for ADL's and mobility prior  Pt did not use any DME  Pt states that she assist her mother with IADL's  Pt drives  Pt's impairments include reduced mobility, 1L of O2 via NC  Recommend discharge to home, no rehab needs  At the end of the session the patient was left in seated position with call bell and phone within reach  The patient's AM-PAC Basic Mobility Inpatient Short Form Raw Score is 23, Standardized Score is 50  88  A standardized score greater than 42 9 suggests the patient may benefit from discharge to home  Please also refer to the recommendation of the Physical Therapist for safe discharge planning  Pt educated about taking walks with staff  Pt on 1L of O2 via NC  Will D/C from PT at this time due to being near her baseline for mobility      Recommendation   PT Discharge Recommendation No rehabilitation needs   PT - OK to Discharge Yes   Additional Comments when medically stable   AM-PAC Basic Mobility Inpatient   Turning in Bed Without Bedrails 4   Lying on Back to Sitting on Edge of Flat Bed 4   Moving Bed to Chair 4   Standing Up From Chair 4   Walk in Room 4   Climb 3-5 Stairs 3   Basic Mobility Inpatient Raw Score 23   Basic Mobility Standardized Score 50 88   Venkata Emmanuel, PT, DPT

## 2021-05-21 NOTE — PROGRESS NOTES
[facility] [facility]  Progress Note - Tamica Olivares 1960, 61 y o  female MRN: [id number]  Unit/Bed#: [facility] 923-01 Encounter: [id number]  Primary Care Provider: Blayne Joel MD   Date and time admitted to hospital: 5/20/2021 10:06 AM    * SIRS (systemic inflammatory response syndrome) (HCC)  Assessment & Plan  · SIRS vs severe sepsis POA, as evidenced by tachycardia, fever (100 4), and lactic acidosis  · Unclear etiology  · Lactic acidosis resolved  · Blood cultures with no growth x2 after 24 hours  · COVID-19 negative  · CTA chest PE study with CT abdomen and pelvis with contrast on admission with no central through proximal segmental PE, inferior lingular atelectasis and dependent hypoventilatory changes in the upper and lower lobes, minimal fluid in diffusely patulous esophagus attributed to esophageal reflux, otherwise no evidence of acute thoracic process  No evidence of acute abdominal pelvic process  · Procalcitonin negative  Repeat  · UA not indicative of UTI   · Ceftriaxone discontinued    Hiatal hernia with gastroesophageal reflux disease and esophagitis  Assessment & Plan  · Status post laparoscopic paraesophageal hernia repair by Dr Ema Blunt on 5/11/21  · On admission the patient endorsed that she was supposed to be on a pureed diet since her surgery for the next 8 weeks, but states she has only been eating bread and water  · No evidence of acute abdominal pelvic process per CT results report with thickening at the GE junction attributed to recent hiatal hernia repair    Schizoaffective disorder [facility])  Assessment & Plan  · Per record review, patient previously on Geodon and Seroquel but she endorsed that she stopped taking her medication 1 week ago because she would not    Outpatient meds were resumed  · Psych consult pending     Hyperlipidemia  Assessment & Plan  · Continue Lipitor    Essential hypertension  Assessment & Plan  · Blood pressure stable, not on antihypertensives    Gastroesophageal reflux disease with esophagitis without hemorrhage  Assessment & Plan  - Patient is s/p Hiatal hernia repair () performed at [facility] by Dr Beverley Carr  -patient endorses she is supposed to be on a pureed diet since her surgery for the next 8 weeks, but states she has only been eating bread and water  -GI stated patient has not returned phone calls for follow up  -Denies current abdominal complaints  -consult to general surgery appreciated   -Initiate clear liquid diet/ nutrition supplementation as needed          VTE Pharmacologic Prophylaxis:   Pharmacologic: Enoxaparin (Lovenox)  Mechanical VTE Prophylaxis in Place: Yes    Patient Centered Rounds: I have performed bedside rounds with nursing staff today  Discussions with Specialists or Other Care Team Provider:     Education and Discussions with Family / Patient: patient and her mother, Beatriz Short, over the phone at the patient's request    Time Spent for Care: 30 minutes  More than 50% of total time spent on counseling and coordination of care as described above  Current Length of Stay: 1 day(s)    Current Patient Status: Inpatient   Certification Statement: The patient will continue to require additional inpatient hospital stay due to as above    Discharge Plan: as above    Code Status: Level 1 - Full Code      Subjective:   Ms Idalmis Forte reports no SOB, CP, abdominal pain    Objective:     Vitals:   Temp (24hrs), Av 9 °F (36 6 °C), Min:97 4 °F (36 3 °C), Max:98 6 °F (37 °C)    Temp:  [97 4 °F (36 3 °C)-98 6 °F (37 °C)] 98 3 °F (36 8 °C)  HR:  [65-95] 65  Resp:  [16-17] 16  BP: (102-136)/(67-75) 136/73  SpO2:  [96 %-99 %] 97 %  Body mass index is 28 35 kg/m²  Input and Output Summary (last 24 hours):        Intake/Output Summary (Last 24 hours) at 2021 1622  Last data filed at 2021 0348  Gross per 24 hour   Intake 1680 ml   Output 2200 ml   Net -520 ml       Physical Exam:     Physical Exam  Vitals signs and nursing note reviewed  Constitutional:       Comments: Patient seen sitting upright in bedside chair   Cardiovascular:      Rate and Rhythm: Normal rate and regular rhythm  Pulmonary:      Effort: Pulmonary effort is normal       Breath sounds: Normal breath sounds  Comments: satting high 90s on 1L NC O2  Abdominal:      General: Bowel sounds are normal       Palpations: Abdomen is soft  Tenderness: There is no abdominal tenderness  Musculoskeletal:      Right lower leg: No edema  Left lower leg: No edema  Skin:     General: Skin is warm  Neurological:      Mental Status: She is alert and oriented to person, place, and time  Psychiatric:         Mood and Affect: Mood normal          Behavior: Behavior normal        Additional Data:     Labs:    Results from last 7 days   Lab Units 05/20/21  1026   WBC Thousand/uL 7 96   HEMOGLOBIN g/dL 10 9*   HEMATOCRIT % 33 1*   PLATELETS Thousands/uL 367   NEUTROS PCT % 82*   LYMPHS PCT % 9*   MONOS PCT % 7   EOS PCT % 1     Results from last 7 days   Lab Units 05/20/21  1026   SODIUM mmol/L 135*   POTASSIUM mmol/L 3 0*   CHLORIDE mmol/L 98*   CO2 mmol/L 27   BUN mg/dL 11   CREATININE mg/dL 0 79   ANION GAP mmol/L 10   CALCIUM mg/dL 10 0   ALBUMIN g/dL 3 3*   TOTAL BILIRUBIN mg/dL 0 28   ALK PHOS U/L 85   ALT U/L 36   AST U/L 21   GLUCOSE RANDOM mg/dL 123     Results from last 7 days   Lab Units 05/20/21  1026   INR  0 98             Results from last 7 days   Lab Units 05/21/21  0657 05/20/21  1217 05/20/21  1026   LACTIC ACID mmol/L 1 6 1 9 3 4*   PROCALCITONIN ng/ml  --   --  0 08           * I Have Reviewed All Lab Data Listed Above  * Additional Pertinent Lab Tests Reviewed:  All Labs Within Last 24 Hours Reviewed    Imaging:    Imaging Reports Reviewed Today Include: CT as above  Imaging Personally Reviewed by Myself Includes:  none    Recent Cultures (last 7 days):     Results from last 7 days   Lab Units 05/20/21  1026   BLOOD CULTURE No Growth at 24 hrs  No Growth at 24 hrs  Last 24 Hours Medication List:   Current Facility-Administered Medications   Medication Dose Route Frequency Provider Last Rate    acetaminophen  975 mg Oral Q8H [address] Salina Bamberger, MD      aluminum-magnesium hydroxide-simethicone  30 mL Oral Q6H PRN Salina Bamberger, MD      aspirin  81 mg Oral Daily Salina Bamberger, MD      atorvastatin  40 mg Oral QPM Salina Bamberger, MD      docusate sodium  100 mg Oral BID Salina Bamberger, MD      enoxaparin  40 mg Subcutaneous Daily Salina Bamberger, MD      ferrous sulfate  325 mg Oral BID With Meals Salina Bamberger, MD      levothyroxine  25 mcg Oral Early Morning Salina Bamberger, MD      LORazepam  2 mg Oral Q6H PRN Salina Bamberger, MD      meclizine  25 mg Oral TID PRN Salina Bamberger, MD      multi-electrolyte  1,000 mL Intravenous Once Salina Bamberger, MD      Followed by   Ghada Estrada multi-electrolyte  1,000 mL Intravenous Once Salina Bamberger, MD      ondansetron  4 mg Intravenous Q6H PRN Salina Bamberger, MD      oxyCODONE  5 mg Oral Q4H PRN Salina Bamberger, MD      pantoprazole  40 mg Oral BID AC Salina Bamberger, MD      PARoxetine  60 mg Oral Daily Salina Bamberger, MD      QUEtiapine  400 mg Oral HS Salina Bamberger, MD      senna  1 tablet Oral Daily Salina Bamberger, MD      sucralfate  1 g Oral 4x Daily (AC & HS) Salina Bamberger, MD      ziprasidone  80 mg Oral Daily Salina Bamberger, MD          Today, Patient Was Seen By: Jeancarlos Bella PA-C    ** Please Note: Dictation voice to text software may have been used in the creation of this document   **

## 2021-05-21 NOTE — ASSESSMENT & PLAN NOTE
· Status post laparoscopic paraesophageal hernia repair by Dr Adria Crowell on 5/11/21  · On admission the patient endorsed that she was supposed to be on a pureed diet since her surgery for the next 8 weeks, but states she has only been eating bread and water  · No evidence of acute abdominal pelvic process per CT results report with thickening at the GE junction attributed to recent hiatal hernia repair

## 2021-05-21 NOTE — SPEECH THERAPY NOTE
Bedside Swallow Evaluation:    Summary:  Pt presents w/ adequate oral and pharyngeal stages of swallowing with puree solids and thin liquids  Patient is s/p hiatal hernia repair on 5/11 and is supposed to be on a puree diet for 8 weeks  Per report, patient had bread at home  Patient states "I didn't feel good and I was unable to go shopping"     Recommendations:  Diet: Puree (as cleared by medical team)  Liquid: Thin   Meds: Whole     Eval only, No f/u tx indicated  No oral or pharyngeal dysphagia observed  Please refer to GI for appropriate diet  From H&P:   Corinne Timmons is a 61 y o  female who presents with With a PMH of s/p hiatal hernia repair (5/11), schizoaffective disorder  HTN, HLD, GERD, and who presents with a chief complaint of worsening shortness of breath over the past 3 days  She states she she was standing and making breakfast when her shortness of breath got so bad she needed to go to the ER  She describes her SOB of having difficulty breathing in and not getting enough air  Patient denied alleviating factors, but admitted she has increased VANG and has been limited in her typical activities  She admits to previously expierencing shortness of breath related to anxiety, but states "this feels different"  She is currently 97% on RA  She also endorses feeling feverish over the past three days as well, but did not take her temperature  Of note, patient states she has not taken her psychiatric medications because she could not pick them up from the pharmacy  She denies current hallucinations, but states that her mother does not like her and wants her out of the house       Consider consult w/:  GI    Reason for consult:  R/o aspiration    Precautions:  None     Current diet:  Full liquid   Premorbid diet[de-identified]  Per GI, puree x 8 weeks post op  Previous VBS:  None   O2 requirement:  NC  Voice/Speech:  wfl  Social:  Lives with family   Follows commands:  wfl                        Cognitive Status:  Awake and alert  Oral [facility] exam:  Dentition: wfl  Not formally assessed, but appears adequate for po intake      Items administered:  Puree, thin liquids  Liquids were taken by straw       Oral stage:  Lip closure: wfl  Mastication: n/a  Bolus formation: wfl  Bolus control: wfl  Transfer: wfl  Oral residue: no  Pocketing: no    Pharyngeal stage:  Swallow promptness: appears adequate  Laryngeal rise: not palpated  Wet voice: no  Throat clear: no  Cough: no  Secondary swallows: no  Audible swallows: no  No overt s/s aspiration    Esophageal stage:  H/o GERD  H/o hiatal hernia

## 2021-05-22 LAB
ANION GAP SERPL CALCULATED.3IONS-SCNC: 5 MMOL/L (ref 4–13)
BUN SERPL-MCNC: 6 MG/DL (ref 5–25)
CALCIUM SERPL-MCNC: 8.8 MG/DL (ref 8.3–10.1)
CHLORIDE SERPL-SCNC: 106 MMOL/L (ref 100–108)
CO2 SERPL-SCNC: 30 MMOL/L (ref 21–32)
CREAT SERPL-MCNC: 0.55 MG/DL (ref 0.6–1.3)
ERYTHROCYTE [DISTWIDTH] IN BLOOD BY AUTOMATED COUNT: 14.7 % (ref 11.6–15.1)
GFR SERPL CREATININE-BSD FRML MDRD: 102 ML/MIN/1.73SQ M
GLUCOSE SERPL-MCNC: 80 MG/DL (ref 65–140)
HCT VFR BLD AUTO: 32.6 % (ref 34.8–46.1)
HGB BLD-MCNC: 10.1 G/DL (ref 11.5–15.4)
MCH RBC QN AUTO: 28.5 PG (ref 26.8–34.3)
MCHC RBC AUTO-ENTMCNC: 31 G/DL (ref 31.4–37.4)
MCV RBC AUTO: 92 FL (ref 82–98)
PLATELET # BLD AUTO: 269 THOUSANDS/UL (ref 149–390)
PMV BLD AUTO: 8.5 FL (ref 8.9–12.7)
POTASSIUM SERPL-SCNC: 3.6 MMOL/L (ref 3.5–5.3)
PROCALCITONIN SERPL-MCNC: 0.06 NG/ML
RBC # BLD AUTO: 3.54 MILLION/UL (ref 3.81–5.12)
SODIUM SERPL-SCNC: 141 MMOL/L (ref 136–145)
WBC # BLD AUTO: 2.67 THOUSAND/UL (ref 4.31–10.16)

## 2021-05-22 PROCEDURE — 99232 SBSQ HOSP IP/OBS MODERATE 35: CPT | Performed by: PHYSICIAN ASSISTANT

## 2021-05-22 PROCEDURE — 85027 COMPLETE CBC AUTOMATED: CPT | Performed by: PHYSICIAN ASSISTANT

## 2021-05-22 PROCEDURE — 80048 BASIC METABOLIC PNL TOTAL CA: CPT | Performed by: PHYSICIAN ASSISTANT

## 2021-05-22 PROCEDURE — 84145 PROCALCITONIN (PCT): CPT | Performed by: PHYSICIAN ASSISTANT

## 2021-05-22 RX ADMIN — SUCRALFATE 1 G: 1 TABLET ORAL at 06:16

## 2021-05-22 RX ADMIN — DOCUSATE SODIUM 100 MG: 100 CAPSULE, LIQUID FILLED ORAL at 17:37

## 2021-05-22 RX ADMIN — SUCRALFATE 1 G: 1 TABLET ORAL at 17:37

## 2021-05-22 RX ADMIN — ASPIRIN 81 MG: 81 TABLET, COATED ORAL at 08:00

## 2021-05-22 RX ADMIN — PAROXETINE HYDROCHLORIDE 60 MG: 20 TABLET, FILM COATED ORAL at 08:02

## 2021-05-22 RX ADMIN — PANTOPRAZOLE SODIUM 40 MG: 40 TABLET, DELAYED RELEASE ORAL at 17:37

## 2021-05-22 RX ADMIN — ATORVASTATIN CALCIUM 40 MG: 40 TABLET, FILM COATED ORAL at 17:37

## 2021-05-22 RX ADMIN — ZIPRASIDONE HYDROCHLORIDE 80 MG: 40 CAPSULE ORAL at 08:02

## 2021-05-22 RX ADMIN — ENOXAPARIN SODIUM 40 MG: 40 INJECTION SUBCUTANEOUS at 08:00

## 2021-05-22 RX ADMIN — SUCRALFATE 1 G: 1 TABLET ORAL at 22:24

## 2021-05-22 RX ADMIN — FERROUS SULFATE TAB 325 MG (65 MG ELEMENTAL FE) 325 MG: 325 (65 FE) TAB at 17:37

## 2021-05-22 RX ADMIN — ONDANSETRON 4 MG: 2 INJECTION INTRAMUSCULAR; INTRAVENOUS at 13:25

## 2021-05-22 RX ADMIN — QUETIAPINE FUMARATE 400 MG: 100 TABLET ORAL at 22:24

## 2021-05-22 RX ADMIN — ACETAMINOPHEN 975 MG: 325 TABLET, FILM COATED ORAL at 06:16

## 2021-05-22 RX ADMIN — LEVOTHYROXINE SODIUM 25 MCG: 25 TABLET ORAL at 06:16

## 2021-05-22 RX ADMIN — SUCRALFATE 1 G: 1 TABLET ORAL at 11:27

## 2021-05-22 RX ADMIN — FERROUS SULFATE TAB 325 MG (65 MG ELEMENTAL FE) 325 MG: 325 (65 FE) TAB at 08:00

## 2021-05-22 RX ADMIN — PANTOPRAZOLE SODIUM 40 MG: 40 TABLET, DELAYED RELEASE ORAL at 06:16

## 2021-05-22 NOTE — ASSESSMENT & PLAN NOTE
· Per record review, patient previously on Geodon and Seroquel but she endorsed that she stopped taking her medication 1 week ago because she would not   Outpatient meds were resumed  Patient now reports that she has these meds at home   Patient denies any delusions, hallucinations, SI, HI    · Outpatient follow up with Psych

## 2021-05-22 NOTE — ASSESSMENT & PLAN NOTE
· Status post laparoscopic paraesophageal hernia repair by Dr Idris Adamson on 5/11/21  · On admission the patient endorsed that she was supposed to be on a pureed diet since her surgery for the next 8 weeks, but stated she has only been eating bread and water  · No evidence of acute abdominal pelvic process per CT results report with thickening at the GE junction attributed to recent hiatal hernia repair

## 2021-05-22 NOTE — PROGRESS NOTES
[address] [facility]  Progress Note - Rochelle Coles 1960, 61 y o  female MRN: [id number]  Unit/Bed#: [facility] 923-01 Encounter: [id number]  Primary Care Provider: Hardeep Tenorio MD   Date and time admitted to hospital: 5/20/2021 10:06 AM    * SIRS (systemic inflammatory response syndrome) (HCC)  Assessment & Plan  · SIRS vs severe sepsis POA, as evidenced by tachycardia, fever (100 4), and lactic acidosis  · Unclear etiology  · Lactic acidosis resolved  · Blood cultures with no growth x2 after 24 hours  · COVID-19 negative  · CTA chest PE study with CT abdomen and pelvis with contrast on admission with no central through proximal segmental PE, inferior lingular atelectasis and dependent hypoventilatory changes in the upper and lower lobes, minimal fluid in diffusely patulous esophagus attributed to esophageal reflux, otherwise no evidence of acute thoracic process  No evidence of acute abdominal pelvic process  · Procalcitonin negative  Repeat pending   · UA not indicative of UTI   · Ceftriaxone discontinued  · Discontinued scheduled Tylenol so as not to mask any potential fever    Hiatal hernia with gastroesophageal reflux disease and esophagitis  Assessment & Plan  · Status post laparoscopic paraesophageal hernia repair by Dr Mahnaz Paredes on 5/11/21  · On admission the patient endorsed that she was supposed to be on a pureed diet since her surgery for the next 8 weeks, but stated she has only been eating bread and water  · No evidence of acute abdominal pelvic process per CT results report with thickening at the GE junction attributed to recent hiatal hernia repair    Schizoaffective disorder ([facility] [address] )  Assessment & Plan  · Per record review, patient previously on Geodon and Seroquel but she endorsed that she stopped taking her medication 1 week ago because she would not   Outpatient meds were resumed  Patient now reports that she has these meds at home   Patient denies any delusions, hallucinations, SI, HI    · Outpatient follow up with Psych    Hyperlipidemia  Assessment & Plan  · Continue Lipitor    Essential hypertension  Assessment & Plan  · Blood pressure stable, not on antihypertensives    VTE Pharmacologic Prophylaxis:   Pharmacologic: Enoxaparin (Lovenox)  Mechanical VTE Prophylaxis in Place: Yes    Patient Centered Rounds: I have performed bedside rounds with nursing staff today  Discussions with Specialists or Other Care Team Provider:     Education and Discussions with Family / Patient: patient; when asked if there was anyone she would like me to call today with an update the patient declined and she stated that she just spoke with her mother on the phone and they were good     Time Spent for Care: 20 minutes  More than 50% of total time spent on counseling and coordination of care as described above  Current Length of Stay: 2 day(s)    Current Patient Status: Inpatient   Certification Statement:  The patient will continue to require additional inpatient hospital stay due to pending blood cultures, monitoring off antibiotics and for any potential fever off Tylenol    Discharge Plan: blood cultures, monitoring off abx  Possible d/c tomorrow    Code Status: Level 1 - Full Code      Subjective:   Ms Godfrey Morgan reports feeling well  She denies complaint  She denies CP, SOB, abdominal pain  She states that when she goes home she will be stopping at the grocery store to get food for her liquid diet     Objective:     Vitals:   Temp (24hrs), Av 1 °F (36 7 °C), Min:97 4 °F (36 3 °C), Max:98 6 °F (37 °C)    Temp:  [97 4 °F (36 3 °C)-98 6 °F (37 °C)] 98 6 °F (37 °C)  HR:  [65-86] 86  BP: (127-136)/(73-85) 128/85  SpO2:  [95 %-100 %] 100 %  Body mass index is 28 35 kg/m²  Input and Output Summary (last 24 hours):        Intake/Output Summary (Last 24 hours) at 2021 1208  Last data filed at 2021 1143  Gross per 24 hour   Intake 480 ml   Output 950 ml   Net -470 ml       Physical Exam:     Physical Exam  Vitals signs and nursing note reviewed  Constitutional:       Appearance: She is not ill-appearing or diaphoretic  Comments: Patient seen sitting upright comfortably resting in bedside chair  Pleasant and cooperative   Cardiovascular:      Rate and Rhythm: Normal rate and regular rhythm  Pulmonary:      Effort: Pulmonary effort is normal  No respiratory distress  Breath sounds: Normal breath sounds  No wheezing  Comments: 98% on room air  Abdominal:      General: Bowel sounds are normal       Palpations: Abdomen is soft  Tenderness: There is no abdominal tenderness  Musculoskeletal:      Right lower leg: No edema  Left lower leg: No edema  Skin:     General: Skin is warm  Neurological:      Mental Status: She is alert        Comments: Oriented to person, place, time   Psychiatric:         Mood and Affect: Mood normal          Behavior: Behavior normal       Comments: Denies SI or HI           Additional Data:     Labs:    Results from last 7 days   Lab Units 05/22/21  0624 05/20/21  1026   WBC Thousand/uL 2 67* 7 96   HEMOGLOBIN g/dL 10 1* 10 9*   HEMATOCRIT % 32 6* 33 1*   PLATELETS Thousands/uL 269 367   NEUTROS PCT %  --  82*   LYMPHS PCT %  --  9*   MONOS PCT %  --  7   EOS PCT %  --  1     Results from last 7 days   Lab Units 05/22/21  0624 05/20/21  1026   SODIUM mmol/L 141 135*   POTASSIUM mmol/L 3 6 3 0*   CHLORIDE mmol/L 106 98*   CO2 mmol/L 30 27   BUN mg/dL 6 11   CREATININE mg/dL 0 55* 0 79   ANION GAP mmol/L 5 10   CALCIUM mg/dL 8 8 10 0   ALBUMIN g/dL  --  3 3*   TOTAL BILIRUBIN mg/dL  --  0 28   ALK PHOS U/L  --  85   ALT U/L  --  36   AST U/L  --  21   GLUCOSE RANDOM mg/dL 80 123     Results from last 7 days   Lab Units 05/20/21  1026   INR  0 98             Results from last 7 days   Lab Units 05/21/21  0657 05/20/21  1217 05/20/21  1026   LACTIC ACID mmol/L 1 6 1 9 3 4*   PROCALCITONIN ng/ml  --   --  0 08           * I Have Reviewed All Lab Data Listed Above  * Additional Pertinent Lab Tests Reviewed: All Labs Within Last 24 Hours Reviewed    Imaging:    Imaging Reports Reviewed Today Include: none  Imaging Personally Reviewed by Myself Includes:  none    Recent Cultures (last 7 days):     Results from last 7 days   Lab Units 05/20/21  1026   BLOOD CULTURE  No Growth at 24 hrs  No Growth at 24 hrs  Last 24 Hours Medication List:   Current Facility-Administered Medications   Medication Dose Route Frequency Provider Last Rate    aluminum-magnesium hydroxide-simethicone  30 mL Oral Q6H PRN Vinetta Semen, MD      aspirin  81 mg Oral Daily Vinetta Semen, MD      atorvastatin  40 mg Oral QPM Vinetta Semen, MD      docusate sodium  100 mg Oral BID Vinetta Semen, MD      enoxaparin  40 mg Subcutaneous Daily Vinetta Semen, MD      ferrous sulfate  325 mg Oral BID With Meals Vinetta Semen, MD      levothyroxine  25 mcg Oral Early Morning Vinetta Semen, MD      LORazepam  2 mg Oral Q6H PRN Vinetta Semen, MD      meclizine  25 mg Oral TID PRN Vinetta Semen, MD      ondansetron  4 mg Intravenous Q6H PRN Vinetta Semen, MD      oxyCODONE  5 mg Oral Q4H PRN Vinetta Semen, MD      pantoprazole  40 mg Oral BID AC Vinetta Semen, MD      PARoxetine  60 mg Oral Daily Vinetta Semen, MD      QUEtiapine  400 mg Oral HS Vinetta Semen, MD      senna  1 tablet Oral Daily Vinetta Semen, MD      sucralfate  1 g Oral 4x Daily (AC & HS) Vinetta Semen, MD      ziprasidone  80 mg Oral Daily Vinetta Semen, MD          Today, Patient Was Seen By: Binu Lyons PA-C    ** Please Note: Dictation voice to text software may have been used in the creation of this document   **

## 2021-05-22 NOTE — PLAN OF CARE
Problem: Potential for Falls  Goal: Patient will remain free of falls  Description: INTERVENTIONS:  - Assess patient frequently for physical needs  -  Identify cognitive and physical deficits and behaviors that affect risk of falls    -  [place] fall precautions as indicated by assessment   - Educate patient/family on patient safety including physical limitations  - Instruct patient to call for assistance with activity based on assessment  - Modify environment to reduce risk of injury  - Consider OT/PT consult to assist with strengthening/mobility  Outcome: Progressing     Problem: Prexisting or High Potential for Compromised Skin Integrity  Goal: Skin integrity is maintained or improved  Description: INTERVENTIONS:  - Identify patients at risk for skin breakdown  - Assess and monitor skin integrity  - Assess and monitor nutrition and hydration status  - Monitor labs   - Assess for incontinence   - Turn and reposition patient  - Assist with mobility/ambulation  - Relieve pressure over bony prominences  - Avoid friction and shearing  - Provide appropriate hygiene as needed including keeping skin clean and dry  - Evaluate need for skin moisturizer/barrier cream  - Collaborate with interdisciplinary team   - Patient/family teaching  - Consider wound care consult   Outcome: Progressing     Problem: PAIN - ADULT  Goal: Verbalizes/displays adequate comfort level or baseline comfort level  Description: Interventions:  - Encourage patient to monitor pain and request assistance  - Assess pain using appropriate pain scale  - Administer analgesics based on type and severity of pain and evaluate response  - Implement non-pharmacological measures as appropriate and evaluate response  - Consider cultural and social influences on pain and pain management  - Notify physician/advanced practitioner if interventions unsuccessful or patient reports new pain  Outcome: Progressing     Problem: INFECTION - ADULT  Goal: Absence or prevention of progression during hospitalization  Description: INTERVENTIONS:  - Assess and monitor for signs and symptoms of infection  - Monitor lab/diagnostic results  - Monitor all insertion sites, i e  indwelling lines, tubes, and drains  - Monitor endotracheal if appropriate and nasal secretions for changes in amount and color  - [place] appropriate cooling/warming therapies per order  - Administer medications as ordered  - Instruct and encourage patient and family to use good hand hygiene technique  - Identify and instruct in appropriate isolation precautions for identified infection/condition  Outcome: Progressing  Goal: Absence of fever/infection during neutropenic period  Description: INTERVENTIONS:  - Monitor WBC    Outcome: Progressing     Problem: SAFETY ADULT  Goal: Patient will remain free of falls  Description: INTERVENTIONS:  - Assess patient frequently for physical needs  -  Identify cognitive and physical deficits and behaviors that affect risk of falls    -  [place] fall precautions as indicated by assessment   - Educate patient/family on patient safety including physical limitations  - Instruct patient to call for assistance with activity based on assessment  - Modify environment to reduce risk of injury  - Consider OT/PT consult to assist with strengthening/mobility  Outcome: Progressing  Goal: Maintain or return to baseline ADL function  Description: INTERVENTIONS:  -  Assess patient's ability to carry out ADLs; assess patient's baseline for ADL function and identify physical deficits which impact ability to perform ADLs (bathing, care of mouth/teeth, toileting, grooming, dressing, etc )  - Assess/evaluate cause of self-care deficits   - Assess range of motion  - Assess patient's mobility; develop plan if impaired  - Assess patient's need for assistive devices and provide as appropriate  - Encourage maximum independence but intervene and supervise when necessary  - Involve family in performance of ADLs  - Assess for home care needs following discharge   - Consider OT consult to assist with ADL evaluation and planning for discharge  - Provide patient education as appropriate  Outcome: Progressing  Goal: Maintain or return mobility status to optimal level  Description: INTERVENTIONS:  - Assess patient's baseline mobility status (ambulation, transfers, stairs, etc )    - Identify cognitive and physical deficits and behaviors that affect mobility  - Identify mobility aids required to assist with transfers and/or ambulation (gait belt, sit-to-stand, lift, walker, cane, etc )  - [place] fall precautions as indicated by assessment  - Record patient progress and toleration of activity level on Mobility SBAR; progress patient to next Phase/Stage  - Instruct patient to call for assistance with activity based on assessment  - Consider rehabilitation consult to assist with strengthening/weightbearing, etc   Outcome: Progressing     Problem: DISCHARGE PLANNING  Goal: Discharge to home or other facility with appropriate resources  Description: INTERVENTIONS:  - Identify barriers to discharge w/patient and caregiver  - Arrange for needed discharge resources and transportation as appropriate  - Identify discharge learning needs (meds, wound care, etc )  - Arrange for interpretive services to assist at discharge as needed  - Refer to Case Management Department for coordinating discharge planning if the patient needs post-hospital services based on physician/advanced practitioner order or complex needs related to functional status, cognitive ability, or social support system  Outcome: Progressing     Problem: Knowledge Deficit  Goal: Patient/family/caregiver demonstrates understanding of disease process, treatment plan, medications, and discharge instructions  Description: Complete learning assessment and assess knowledge base    Interventions:  - Provide teaching at level of understanding  - Provide teaching via preferred learning methods  Outcome: Progressing     Problem: Nutrition/Hydration-ADULT  Goal: Nutrient/Hydration intake appropriate for improving, restoring or maintaining nutritional needs  Description: Monitor and assess patient's nutrition/hydration status for malnutrition  Collaborate with interdisciplinary team and initiate plan and interventions as ordered  Monitor patient's weight and dietary intake as ordered or per policy  Utilize nutrition screening tool and intervene as necessary  Determine patient's food preferences and provide high-protein, high-caloric foods as appropriate       INTERVENTIONS:  - Monitor oral intake, urinary output, labs, and treatment plans  - Assess nutrition and hydration status and recommend course of action  - Evaluate amount of meals eaten  - Assist patient with eating if necessary   - Allow adequate time for meals  - Recommend/ encourage appropriate diets, oral nutritional supplements, and vitamin/mineral supplements  - Order, calculate, and assess calorie counts as needed  - Recommend, monitor, and adjust tube feedings and TPN/PPN based on assessed needs  - Assess need for intravenous fluids  - Provide specific nutrition/hydration education as appropriate  - Include patient/family/caregiver in decisions related to nutrition  Outcome: Progressing

## 2021-05-22 NOTE — UTILIZATION REVIEW
Inpatient Admission Authorization Request   NOTIFICATION OF INPATIENT ADMISSION/INPATIENT AUTHORIZATION REQUEST   SERVICING FACILITY:   [facility]  Address: [address] [place] [address], [address] [place] [address]  Tax ID: [id number]  NPI: [id number]  Place of Service: Inpatient [address] [facility] Code: 24     ATTENDING PROVIDER:  Attending Name and NPI#: Lynette Bruno [[id number]]  Address: [address] [place] [address], [address] [place] [address]  Phone: [phone]     UTILIZATION REVIEW CONTACT:  [facility] Utilization   Network Utilization Review Department  Phone: [phone]  Fax: [phone]  Email: [email or web address]     PHYSICIAN ADVISORY SERVICES:  FOR [id number] REVIEW - MEDICAL NECESSITY DENIAL  Phone: [phone]  Fax: [phone]  Email: [email or web address]     TYPE OF REQUEST:  Inpatient Status     ADMISSION INFORMATION:  ADMISSION DATE/TIME: 5/20/21 1328  PATIENT DIAGNOSIS CODE/DESCRIPTION:  Lumbar radiculopathy [M54 16]  Pneumonia [J18 9]  SOB (shortness of breath) [R06 02]  DDD (degenerative disc disease), lumbar [M51 36]  Spondylolisthesis at L4-L5 level [M43 16]  Localized osteoporosis with current pathological fracture with routine healing [M80 80XD]  DISCHARGE DATE/TIME: No discharge date for patient encounter  DISCHARGE DISPOSITION (IF DISCHARGED): Home/Self Care     IMPORTANT INFORMATION:  Please contact the [facility] directly with any questions or concerns regarding this request  Department voicemails are confidential     Send requests for admission clinical reviews, concurrent reviews, approvals, and administrative denials due to lack of clinical to fax [phone]

## 2021-05-22 NOTE — ASSESSMENT & PLAN NOTE
· SIRS vs severe sepsis POA, as evidenced by tachycardia, fever (100 4), and lactic acidosis  · Unclear etiology  · Lactic acidosis resolved  · Blood cultures with no growth x2 after 24 hours  · COVID-19 negative  · CTA chest PE study with CT abdomen and pelvis with contrast on admission with no central through proximal segmental PE, inferior lingular atelectasis and dependent hypoventilatory changes in the upper and lower lobes, minimal fluid in diffusely patulous esophagus attributed to esophageal reflux, otherwise no evidence of acute thoracic process  No evidence of acute abdominal pelvic process  · Procalcitonin negative    Repeat pending   · UA not indicative of UTI   · Ceftriaxone discontinued  · Discontinued scheduled Tylenol so as not to mask any potential fever

## 2021-05-22 NOTE — PLAN OF CARE
Problem: Potential for Falls  Goal: Patient will remain free of falls  Description: INTERVENTIONS:  - Assess patient frequently for physical needs  -  Identify cognitive and physical deficits and behaviors that affect risk of falls    -  [place] fall precautions as indicated by assessment   - Educate patient/family on patient safety including physical limitations  - Instruct patient to call for assistance with activity based on assessment  - Modify environment to reduce risk of injury  - Consider OT/PT consult to assist with strengthening/mobility  Outcome: Progressing     Problem: Prexisting or High Potential for Compromised Skin Integrity  Goal: Skin integrity is maintained or improved  Description: INTERVENTIONS:  - Identify patients at risk for skin breakdown  - Assess and monitor skin integrity  - Assess and monitor nutrition and hydration status  - Monitor labs   - Assess for incontinence   - Turn and reposition patient  - Assist with mobility/ambulation  - Relieve pressure over bony prominences  - Avoid friction and shearing  - Provide appropriate hygiene as needed including keeping skin clean and dry  - Evaluate need for skin moisturizer/barrier cream  - Collaborate with interdisciplinary team   - Patient/family teaching  - Consider wound care consult   Outcome: Progressing     Problem: PAIN - ADULT  Goal: Verbalizes/displays adequate comfort level or baseline comfort level  Description: Interventions:  - Encourage patient to monitor pain and request assistance  - Assess pain using appropriate pain scale  - Administer analgesics based on type and severity of pain and evaluate response  - Implement non-pharmacological measures as appropriate and evaluate response  - Consider cultural and social influences on pain and pain management  - Notify physician/advanced practitioner if interventions unsuccessful or patient reports new pain  Outcome: Progressing     Problem: INFECTION - ADULT  Goal: Absence or prevention of progression during hospitalization  Description: INTERVENTIONS:  - Assess and monitor for signs and symptoms of infection  - Monitor lab/diagnostic results  - Monitor all insertion sites, i e  indwelling lines, tubes, and drains  - Monitor endotracheal if appropriate and nasal secretions for changes in amount and color  - [place] appropriate cooling/warming therapies per order  - Administer medications as ordered  - Instruct and encourage patient and family to use good hand hygiene technique  - Identify and instruct in appropriate isolation precautions for identified infection/condition  Outcome: Progressing  Goal: Absence of fever/infection during neutropenic period  Description: INTERVENTIONS:  - Monitor WBC    Outcome: Progressing     Problem: SAFETY ADULT  Goal: Patient will remain free of falls  Description: INTERVENTIONS:  - Assess patient frequently for physical needs  -  Identify cognitive and physical deficits and behaviors that affect risk of falls    -  [place] fall precautions as indicated by assessment   - Educate patient/family on patient safety including physical limitations  - Instruct patient to call for assistance with activity based on assessment  - Modify environment to reduce risk of injury  - Consider OT/PT consult to assist with strengthening/mobility  Outcome: Progressing  Goal: Maintain or return to baseline ADL function  Description: INTERVENTIONS:  -  Assess patient's ability to carry out ADLs; assess patient's baseline for ADL function and identify physical deficits which impact ability to perform ADLs (bathing, care of mouth/teeth, toileting, grooming, dressing, etc )  - Assess/evaluate cause of self-care deficits   - Assess range of motion  - Assess patient's mobility; develop plan if impaired  - Assess patient's need for assistive devices and provide as appropriate  - Encourage maximum independence but intervene and supervise when necessary  - Involve family in performance of ADLs  - Assess for home care needs following discharge   - Consider OT consult to assist with ADL evaluation and planning for discharge  - Provide patient education as appropriate  Outcome: Progressing  Goal: Maintain or return mobility status to optimal level  Description: INTERVENTIONS:  - Assess patient's baseline mobility status (ambulation, transfers, stairs, etc )    - Identify cognitive and physical deficits and behaviors that affect mobility  - Identify mobility aids required to assist with transfers and/or ambulation (gait belt, sit-to-stand, lift, walker, cane, etc )  - [place] fall precautions as indicated by assessment  - Record patient progress and toleration of activity level on Mobility SBAR; progress patient to next Phase/Stage  - Instruct patient to call for assistance with activity based on assessment  - Consider rehabilitation consult to assist with strengthening/weightbearing, etc   Outcome: Progressing     Problem: DISCHARGE PLANNING  Goal: Discharge to home or other facility with appropriate resources  Description: INTERVENTIONS:  - Identify barriers to discharge w/patient and caregiver  - Arrange for needed discharge resources and transportation as appropriate  - Identify discharge learning needs (meds, wound care, etc )  - Arrange for interpretive services to assist at discharge as needed  - Refer to Case Management Department for coordinating discharge planning if the patient needs post-hospital services based on physician/advanced practitioner order or complex needs related to functional status, cognitive ability, or social support system  Outcome: Progressing     Problem: Knowledge Deficit  Goal: Patient/family/caregiver demonstrates understanding of disease process, treatment plan, medications, and discharge instructions  Description: Complete learning assessment and assess knowledge base    Interventions:  - Provide teaching at level of understanding  - Provide teaching via preferred learning methods  Outcome: Progressing     Problem: Nutrition/Hydration-ADULT  Goal: Nutrient/Hydration intake appropriate for improving, restoring or maintaining nutritional needs  Description: Monitor and assess patient's nutrition/hydration status for malnutrition  Collaborate with interdisciplinary team and initiate plan and interventions as ordered  Monitor patient's weight and dietary intake as ordered or per policy  Utilize nutrition screening tool and intervene as necessary  Determine patient's food preferences and provide high-protein, high-caloric foods as appropriate       INTERVENTIONS:  - Monitor oral intake, urinary output, labs, and treatment plans  - Assess nutrition and hydration status and recommend course of action  - Evaluate amount of meals eaten  - Assist patient with eating if necessary   - Allow adequate time for meals  - Recommend/ encourage appropriate diets, oral nutritional supplements, and vitamin/mineral supplements  - Order, calculate, and assess calorie counts as needed  - Recommend, monitor, and adjust tube feedings and TPN/PPN based on assessed needs  - Assess need for intravenous fluids  - Provide specific nutrition/hydration education as appropriate  - Include patient/family/caregiver in decisions related to nutrition  Outcome: Progressing

## 2021-05-23 LAB
ANION GAP SERPL CALCULATED.3IONS-SCNC: 5 MMOL/L (ref 4–13)
BUN SERPL-MCNC: 4 MG/DL (ref 5–25)
CALCIUM SERPL-MCNC: 9.1 MG/DL (ref 8.3–10.1)
CHLORIDE SERPL-SCNC: 104 MMOL/L (ref 100–108)
CO2 SERPL-SCNC: 30 MMOL/L (ref 21–32)
CREAT SERPL-MCNC: 0.55 MG/DL (ref 0.6–1.3)
ERYTHROCYTE [DISTWIDTH] IN BLOOD BY AUTOMATED COUNT: 14.2 % (ref 11.6–15.1)
GFR SERPL CREATININE-BSD FRML MDRD: 102 ML/MIN/1.73SQ M
GLUCOSE SERPL-MCNC: 76 MG/DL (ref 65–140)
HCT VFR BLD AUTO: 31.6 % (ref 34.8–46.1)
HGB BLD-MCNC: 10 G/DL (ref 11.5–15.4)
MCH RBC QN AUTO: 28.2 PG (ref 26.8–34.3)
MCHC RBC AUTO-ENTMCNC: 31.6 G/DL (ref 31.4–37.4)
MCV RBC AUTO: 89 FL (ref 82–98)
PLATELET # BLD AUTO: 281 THOUSANDS/UL (ref 149–390)
PMV BLD AUTO: 9 FL (ref 8.9–12.7)
POTASSIUM SERPL-SCNC: 3.5 MMOL/L (ref 3.5–5.3)
PROCALCITONIN SERPL-MCNC: 0.07 NG/ML
RBC # BLD AUTO: 3.55 MILLION/UL (ref 3.81–5.12)
SODIUM SERPL-SCNC: 139 MMOL/L (ref 136–145)
WBC # BLD AUTO: 2.6 THOUSAND/UL (ref 4.31–10.16)

## 2021-05-23 PROCEDURE — 84145 PROCALCITONIN (PCT): CPT | Performed by: PHYSICIAN ASSISTANT

## 2021-05-23 PROCEDURE — 85027 COMPLETE CBC AUTOMATED: CPT | Performed by: PHYSICIAN ASSISTANT

## 2021-05-23 PROCEDURE — 99232 SBSQ HOSP IP/OBS MODERATE 35: CPT | Performed by: PHYSICIAN ASSISTANT

## 2021-05-23 PROCEDURE — 80048 BASIC METABOLIC PNL TOTAL CA: CPT | Performed by: PHYSICIAN ASSISTANT

## 2021-05-23 RX ADMIN — ATORVASTATIN CALCIUM 40 MG: 40 TABLET, FILM COATED ORAL at 16:42

## 2021-05-23 RX ADMIN — FERROUS SULFATE TAB 325 MG (65 MG ELEMENTAL FE) 325 MG: 325 (65 FE) TAB at 16:42

## 2021-05-23 RX ADMIN — SUCRALFATE 1 G: 1 TABLET ORAL at 22:23

## 2021-05-23 RX ADMIN — PANTOPRAZOLE SODIUM 40 MG: 40 TABLET, DELAYED RELEASE ORAL at 16:42

## 2021-05-23 RX ADMIN — ASPIRIN 81 MG: 81 TABLET, COATED ORAL at 08:28

## 2021-05-23 RX ADMIN — DOCUSATE SODIUM 100 MG: 100 CAPSULE, LIQUID FILLED ORAL at 08:28

## 2021-05-23 RX ADMIN — SENNOSIDES 8.6 MG: 8.6 TABLET ORAL at 08:28

## 2021-05-23 RX ADMIN — FERROUS SULFATE TAB 325 MG (65 MG ELEMENTAL FE) 325 MG: 325 (65 FE) TAB at 08:28

## 2021-05-23 RX ADMIN — SUCRALFATE 1 G: 1 TABLET ORAL at 16:42

## 2021-05-23 RX ADMIN — QUETIAPINE FUMARATE 400 MG: 100 TABLET ORAL at 22:23

## 2021-05-23 RX ADMIN — PAROXETINE HYDROCHLORIDE 60 MG: 20 TABLET, FILM COATED ORAL at 08:28

## 2021-05-23 RX ADMIN — ZIPRASIDONE HYDROCHLORIDE 80 MG: 40 CAPSULE ORAL at 08:28

## 2021-05-23 RX ADMIN — ENOXAPARIN SODIUM 40 MG: 40 INJECTION SUBCUTANEOUS at 08:30

## 2021-05-23 RX ADMIN — SUCRALFATE 1 G: 1 TABLET ORAL at 05:38

## 2021-05-23 RX ADMIN — SUCRALFATE 1 G: 1 TABLET ORAL at 11:28

## 2021-05-23 RX ADMIN — LEVOTHYROXINE SODIUM 25 MCG: 25 TABLET ORAL at 05:39

## 2021-05-23 RX ADMIN — PANTOPRAZOLE SODIUM 40 MG: 40 TABLET, DELAYED RELEASE ORAL at 05:41

## 2021-05-23 RX ADMIN — DOCUSATE SODIUM 100 MG: 100 CAPSULE, LIQUID FILLED ORAL at 16:42

## 2021-05-23 NOTE — ASSESSMENT & PLAN NOTE
· Per record review, patient previously on Geodon and Seroquel but she endorsed that she stopped taking her medication 1 week ago because she would not   Outpatient meds were resumed  Patient reports that she has these meds at home   Patient denies any delusions, hallucinations, SI, HI    · Outpatient follow up with Psych

## 2021-05-23 NOTE — PLAN OF CARE
Problem: Potential for Falls  Goal: Patient will remain free of falls  Description: INTERVENTIONS:  - Assess patient frequently for physical needs  -  Identify cognitive and physical deficits and behaviors that affect risk of falls    -  [place] fall precautions as indicated by assessment   - Educate patient/family on patient safety including physical limitations  - Instruct patient to call for assistance with activity based on assessment  - Modify environment to reduce risk of injury  - Consider OT/PT consult to assist with strengthening/mobility  Outcome: Progressing     Problem: Prexisting or High Potential for Compromised Skin Integrity  Goal: Skin integrity is maintained or improved  Description: INTERVENTIONS:  - Identify patients at risk for skin breakdown  - Assess and monitor skin integrity  - Assess and monitor nutrition and hydration status  - Monitor labs   - Assess for incontinence   - Turn and reposition patient  - Assist with mobility/ambulation  - Relieve pressure over bony prominences  - Avoid friction and shearing  - Provide appropriate hygiene as needed including keeping skin clean and dry  - Evaluate need for skin moisturizer/barrier cream  - Collaborate with interdisciplinary team   - Patient/family teaching  - Consider wound care consult   Outcome: Progressing     Problem: PAIN - ADULT  Goal: Verbalizes/displays adequate comfort level or baseline comfort level  Description: Interventions:  - Encourage patient to monitor pain and request assistance  - Assess pain using appropriate pain scale  - Administer analgesics based on type and severity of pain and evaluate response  - Implement non-pharmacological measures as appropriate and evaluate response  - Consider cultural and social influences on pain and pain management  - Notify physician/advanced practitioner if interventions unsuccessful or patient reports new pain  Outcome: Progressing     Problem: INFECTION - ADULT  Goal: Absence or prevention of progression during hospitalization  Description: INTERVENTIONS:  - Assess and monitor for signs and symptoms of infection  - Monitor lab/diagnostic results  - Monitor all insertion sites, i e  indwelling lines, tubes, and drains  - Monitor endotracheal if appropriate and nasal secretions for changes in amount and color  - [place] appropriate cooling/warming therapies per order  - Administer medications as ordered  - Instruct and encourage patient and family to use good hand hygiene technique  - Identify and instruct in appropriate isolation precautions for identified infection/condition  Outcome: Progressing  Goal: Absence of fever/infection during neutropenic period  Description: INTERVENTIONS:  - Monitor WBC    Outcome: Progressing     Problem: SAFETY ADULT  Goal: Patient will remain free of falls  Description: INTERVENTIONS:  - Assess patient frequently for physical needs  -  Identify cognitive and physical deficits and behaviors that affect risk of falls    -  [place] fall precautions as indicated by assessment   - Educate patient/family on patient safety including physical limitations  - Instruct patient to call for assistance with activity based on assessment  - Modify environment to reduce risk of injury  - Consider OT/PT consult to assist with strengthening/mobility  Outcome: Progressing  Goal: Maintain or return to baseline ADL function  Description: INTERVENTIONS:  -  Assess patient's ability to carry out ADLs; assess patient's baseline for ADL function and identify physical deficits which impact ability to perform ADLs (bathing, care of mouth/teeth, toileting, grooming, dressing, etc )  - Assess/evaluate cause of self-care deficits   - Assess range of motion  - Assess patient's mobility; develop plan if impaired  - Assess patient's need for assistive devices and provide as appropriate  - Encourage maximum independence but intervene and supervise when necessary  - Involve family in performance of ADLs  - Assess for home care needs following discharge   - Consider OT consult to assist with ADL evaluation and planning for discharge  - Provide patient education as appropriate  Outcome: Progressing  Goal: Maintain or return mobility status to optimal level  Description: INTERVENTIONS:  - Assess patient's baseline mobility status (ambulation, transfers, stairs, etc )    - Identify cognitive and physical deficits and behaviors that affect mobility  - Identify mobility aids required to assist with transfers and/or ambulation (gait belt, sit-to-stand, lift, walker, cane, etc )  - [place] fall precautions as indicated by assessment  - Record patient progress and toleration of activity level on Mobility SBAR; progress patient to next Phase/Stage  - Instruct patient to call for assistance with activity based on assessment  - Consider rehabilitation consult to assist with strengthening/weightbearing, etc   Outcome: Progressing     Problem: DISCHARGE PLANNING  Goal: Discharge to home or other facility with appropriate resources  Description: INTERVENTIONS:  - Identify barriers to discharge w/patient and caregiver  - Arrange for needed discharge resources and transportation as appropriate  - Identify discharge learning needs (meds, wound care, etc )  - Arrange for interpretive services to assist at discharge as needed  - Refer to Case Management Department for coordinating discharge planning if the patient needs post-hospital services based on physician/advanced practitioner order or complex needs related to functional status, cognitive ability, or social support system  Outcome: Progressing     Problem: Knowledge Deficit  Goal: Patient/family/caregiver demonstrates understanding of disease process, treatment plan, medications, and discharge instructions  Description: Complete learning assessment and assess knowledge base    Interventions:  - Provide teaching at level of understanding  - Provide teaching via preferred learning methods  Outcome: Progressing     Problem: Nutrition/Hydration-ADULT  Goal: Nutrient/Hydration intake appropriate for improving, restoring or maintaining nutritional needs  Description: Monitor and assess patient's nutrition/hydration status for malnutrition  Collaborate with interdisciplinary team and initiate plan and interventions as ordered  Monitor patient's weight and dietary intake as ordered or per policy  Utilize nutrition screening tool and intervene as necessary  Determine patient's food preferences and provide high-protein, high-caloric foods as appropriate       INTERVENTIONS:  - Monitor oral intake, urinary output, labs, and treatment plans  - Assess nutrition and hydration status and recommend course of action  - Evaluate amount of meals eaten  - Assist patient with eating if necessary   - Allow adequate time for meals  - Recommend/ encourage appropriate diets, oral nutritional supplements, and vitamin/mineral supplements  - Order, calculate, and assess calorie counts as needed  - Recommend, monitor, and adjust tube feedings and TPN/PPN based on assessed needs  - Assess need for intravenous fluids  - Provide specific nutrition/hydration education as appropriate  - Include patient/family/caregiver in decisions related to nutrition  Outcome: Progressing

## 2021-05-23 NOTE — PROGRESS NOTES
[address] [facility]  Progress Note - Ethel Sink 1960, 61 y o  female MRN: [id number]  Unit/Bed#: [facility] 923-01 Encounter: [id number]  Primary Care Provider: Aracelis Alston MD   Date and time admitted to hospital: 5/20/2021 10:06 AM    * SIRS (systemic inflammatory response syndrome) (HCC)  Assessment & Plan  · SIRS vs severe sepsis POA, as evidenced by tachycardia, fever (100 4), and lactic acidosis  · Unclear etiology  · Lactic acidosis resolved  · Blood cultures with no growth x2 after 48 hours  · COVID-19 negative  · CTA chest PE study with CT abdomen and pelvis with contrast on admission with no central through proximal segmental PE, inferior lingular atelectasis and dependent hypoventilatory changes in the upper and lower lobes, minimal fluid in diffusely patulous esophagus attributed to esophageal reflux, otherwise no evidence of acute thoracic process  No evidence of acute abdominal pelvic process  · Procalcitonin negative x3  · UA not indicative of UTI   · Ceftriaxone discontinued prior  Monitoring off abx  · Off scheduled Tylenol so as not to mask any potential fever    Hiatal hernia with gastroesophageal reflux disease and esophagitis  Assessment & Plan  · Status post laparoscopic paraesophageal hernia repair by Dr Jose Ramirez on 5/11/21  · On admission the patient endorsed that she was supposed to be on a pureed diet since her surgery for the next 8 weeks, but stated she has only been eating bread and water  · No evidence of acute abdominal pelvic process per CT results report with thickening at the GE junction attributed to recent hiatal hernia repair    Schizoaffective disorder [facility])  Assessment & Plan  · Per record review, patient previously on Geodon and Seroquel but she endorsed that she stopped taking her medication 1 week ago because she would not   Outpatient meds were resumed  Patient reports that she has these meds at home   Patient denies any delusions, hallucinations, SI, HI    · Outpatient follow up with Psych    Hyperlipidemia  Assessment & Plan  · Continue Lipitor    Essential hypertension  Assessment & Plan  · Blood pressure stable, not on antihypertensives      VTE Pharmacologic Prophylaxis:   Pharmacologic: Enoxaparin (Lovenox)  Mechanical VTE Prophylaxis in Place: Yes    Patient Centered Rounds: I have performed bedside rounds with nursing staff today  [facility] with Specialists or Other Care Team Provider:     Education and Discussions with Family / Patient: patient; when asked if there was anyone she would like me to call today with an update the patient kindly declined     Time Spent for Care: 20 minutes  More than 50% of total time spent on counseling and coordination of care as described above  Current Length of Stay: 3 day(s)    Current Patient Status: Inpatient   Certification Statement: The patient will continue to require additional inpatient hospital stay due to patient reports that she feels generally weak and does not feel ready to go home today until her strength is better  She is agreeable to PT re-eval    Discharge Plan: patient reports feeling generally fatigued and does not feel ready to go home today until her strength is better  She is agreeable to PT re-eval  Then likely discharge tomorrow    Code Status: Level 1 - Full Code      Subjective:   Ms Veena Persaud reports "seepage" from her recent surgical incision sites  She also reports feeling generally weak    Objective:     Vitals:   Temp (24hrs), Av 9 °F (36 6 °C), Min:97 3 °F (36 3 °C), Max:98 5 °F (36 9 °C)    Temp:  [97 3 °F (36 3 °C)-98 5 °F (36 9 °C)] 98 5 °F (36 9 °C)  HR:  [67-86] 84  Resp:  [16] 16  BP: (115-150)/(71-94) 120/71  SpO2:  [95 %-97 %] 95 %  Body mass index is 28 35 kg/m²  Input and Output Summary (last 24 hours):        Intake/Output Summary (Last 24 hours) at 2021 1213  Last data filed at 2021 0900  Gross per 24 hour Intake 1280 ml   Output 3600 ml   Net -2320 ml       Physical Exam:     Physical Exam  Vitals signs and nursing note reviewed  Constitutional:       Comments: Patient seen lying in bed comfortably resting, NAD   Cardiovascular:      Rate and Rhythm: Normal rate and regular rhythm  Pulmonary:      Effort: Pulmonary effort is normal       Breath sounds: Normal breath sounds  Abdominal:      General: Bowel sounds are normal       Palpations: Abdomen is soft  Tenderness: There is no abdominal tenderness  Skin:     Comments: Multiple small incisions noted on abdomen with dry scabbing, no drainage noted from any   Neurological:      Mental Status: She is alert and oriented to person, place, and time  Psychiatric:         Mood and Affect: Mood normal          Behavior: Behavior normal          Additional Data:     Labs:    Results from last 7 days   Lab Units 05/23/21  0536  05/20/21  1026   WBC Thousand/uL 2 60*   < > 7 96   HEMOGLOBIN g/dL 10 0*   < > 10 9*   HEMATOCRIT % 31 6*   < > 33 1*   PLATELETS Thousands/uL 281   < > 367   NEUTROS PCT %  --   --  82*   LYMPHS PCT %  --   --  9*   MONOS PCT %  --   --  7   EOS PCT %  --   --  1    < > = values in this interval not displayed  Results from last 7 days   Lab Units 05/23/21 0536  05/20/21  1026   SODIUM mmol/L 139   < > 135*   POTASSIUM mmol/L 3 5   < > 3 0*   CHLORIDE mmol/L 104   < > 98*   CO2 mmol/L 30   < > 27   BUN mg/dL 4*   < > 11   CREATININE mg/dL 0 55*   < > 0 79   ANION GAP mmol/L 5   < > 10   CALCIUM mg/dL 9 1   < > 10 0   ALBUMIN g/dL  --   --  3 3*   TOTAL BILIRUBIN mg/dL  --   --  0 28   ALK PHOS U/L  --   --  85   ALT U/L  --   --  36   AST U/L  --   --  21   GLUCOSE RANDOM mg/dL 76   < > 123    < > = values in this interval not displayed       Results from last 7 days   Lab Units 05/20/21  1026   INR  0 98             Results from last 7 days   Lab Units 05/23/21  0536 05/22/21  9660 05/21/21  0657 05/20/21  1217 05/20/21  1026 LACTIC ACID mmol/L  --   --  1 6 1 9 3 4*   PROCALCITONIN ng/ml 0 07 0 06  --   --  0 08           * I Have Reviewed All Lab Data Listed Above  * Additional Pertinent Lab Tests Reviewed: All Labs Within Last 24 Hours Reviewed    Imaging:    Imaging Reports Reviewed Today Include: none  Imaging Personally Reviewed by Myself Includes:  none    Recent Cultures (last 7 days):     Results from last 7 days   Lab Units 05/20/21  1026   BLOOD CULTURE  No Growth at 48 hrs  No Growth at 48 hrs  Last 24 Hours Medication List:   Current Facility-Administered Medications   Medication Dose Route Frequency Provider Last Rate    aluminum-magnesium hydroxide-simethicone  30 mL Oral Q6H PRN Vonda Hyman MD      aspirin  81 mg Oral Daily Vonda yHman MD      atorvastatin  40 mg Oral QPM Vonda Hyman MD      docusate sodium  100 mg Oral BID Vonda Hyman MD      enoxaparin  40 mg Subcutaneous Daily Vonda Hyman MD      ferrous sulfate  325 mg Oral BID With Meals Vonda Hyman MD      levothyroxine  25 mcg Oral Early Morning Vonda Hyman MD      LORazepam  2 mg Oral Q6H PRN Vonda Hyman MD      meclizine  25 mg Oral TID PRN Vonda Hyman MD      ondansetron  4 mg Intravenous Q6H PRN Vonda Hyman MD      oxyCODONE  5 mg Oral Q4H PRN Vonda Hyman MD      pantoprazole  40 mg Oral BID AC Vonda Hyman MD      PARoxetine  60 mg Oral Daily Vonda Hyman MD      QUEtiapine  400 mg Oral HS Vonda Hyman MD      senna  1 tablet Oral Daily Vonda Hyman MD      sucralfate  1 g Oral 4x Daily (AC & HS) Vonda Hyman MD      ziprasidone  80 mg Oral Daily Vonda Hyman MD          Today, Patient Was Seen By: Kerney Opitz, PA-C    ** Please Note: Dictation voice to text software may have been used in the creation of this document   **

## 2021-05-23 NOTE — PLAN OF CARE
Problem: Potential for Falls  Goal: Patient will remain free of falls  Description: INTERVENTIONS:  - Assess patient frequently for physical needs  -  Identify cognitive and physical deficits and behaviors that affect risk of falls    -  [place] fall precautions as indicated by assessment   - Educate patient/family on patient safety including physical limitations  - Instruct patient to call for assistance with activity based on assessment  - Modify environment to reduce risk of injury  - Consider OT/PT consult to assist with strengthening/mobility  Outcome: Progressing     Problem: Prexisting or High Potential for Compromised Skin Integrity  Goal: Skin integrity is maintained or improved  Description: INTERVENTIONS:  - Identify patients at risk for skin breakdown  - Assess and monitor skin integrity  - Assess and monitor nutrition and hydration status  - Monitor labs   - Assess for incontinence   - Turn and reposition patient  - Assist with mobility/ambulation  - Relieve pressure over bony prominences  - Avoid friction and shearing  - Provide appropriate hygiene as needed including keeping skin clean and dry  - Evaluate need for skin moisturizer/barrier cream  - Collaborate with interdisciplinary team   - Patient/family teaching  - Consider wound care consult   Outcome: Progressing     Problem: PAIN - ADULT  Goal: Verbalizes/displays adequate comfort level or baseline comfort level  Description: Interventions:  - Encourage patient to monitor pain and request assistance  - Assess pain using appropriate pain scale  - Administer analgesics based on type and severity of pain and evaluate response  - Implement non-pharmacological measures as appropriate and evaluate response  - Consider cultural and social influences on pain and pain management  - Notify physician/advanced practitioner if interventions unsuccessful or patient reports new pain  Outcome: Progressing     Problem: INFECTION - ADULT  Goal: Absence or prevention of progression during hospitalization  Description: INTERVENTIONS:  - Assess and monitor for signs and symptoms of infection  - Monitor lab/diagnostic results  - Monitor all insertion sites, i e  indwelling lines, tubes, and drains  - Monitor endotracheal if appropriate and nasal secretions for changes in amount and color  - [place] appropriate cooling/warming therapies per order  - Administer medications as ordered  - Instruct and encourage patient and family to use good hand hygiene technique  - Identify and instruct in appropriate isolation precautions for identified infection/condition  Outcome: Progressing  Goal: Absence of fever/infection during neutropenic period  Description: INTERVENTIONS:  - Monitor WBC    Outcome: Progressing     Problem: SAFETY ADULT  Goal: Patient will remain free of falls  Description: INTERVENTIONS:  - Assess patient frequently for physical needs  -  Identify cognitive and physical deficits and behaviors that affect risk of falls    -  [place] fall precautions as indicated by assessment   - Educate patient/family on patient safety including physical limitations  - Instruct patient to call for assistance with activity based on assessment  - Modify environment to reduce risk of injury  - Consider OT/PT consult to assist with strengthening/mobility  Outcome: Progressing  Goal: Maintain or return to baseline ADL function  Description: INTERVENTIONS:  -  Assess patient's ability to carry out ADLs; assess patient's baseline for ADL function and identify physical deficits which impact ability to perform ADLs (bathing, care of mouth/teeth, toileting, grooming, dressing, etc )  - Assess/evaluate cause of self-care deficits   - Assess range of motion  - Assess patient's mobility; develop plan if impaired  - Assess patient's need for assistive devices and provide as appropriate  - Encourage maximum independence but intervene and supervise when necessary  - Involve family in performance of ADLs  - Assess for home care needs following discharge   - Consider OT consult to assist with ADL evaluation and planning for discharge  - Provide patient education as appropriate  Outcome: Progressing  Goal: Maintain or return mobility status to optimal level  Description: INTERVENTIONS:  - Assess patient's baseline mobility status (ambulation, transfers, stairs, etc )    - Identify cognitive and physical deficits and behaviors that affect mobility  - Identify mobility aids required to assist with transfers and/or ambulation (gait belt, sit-to-stand, lift, walker, cane, etc )  - [place] fall precautions as indicated by assessment  - Record patient progress and toleration of activity level on Mobility SBAR; progress patient to next Phase/Stage  - Instruct patient to call for assistance with activity based on assessment  - Consider rehabilitation consult to assist with strengthening/weightbearing, etc   Outcome: Progressing     Problem: DISCHARGE PLANNING  Goal: Discharge to home or other facility with appropriate resources  Description: INTERVENTIONS:  - Identify barriers to discharge w/patient and caregiver  - Arrange for needed discharge resources and transportation as appropriate  - Identify discharge learning needs (meds, wound care, etc )  - Arrange for interpretive services to assist at discharge as needed  - Refer to Case Management Department for coordinating discharge planning if the patient needs post-hospital services based on physician/advanced practitioner order or complex needs related to functional status, cognitive ability, or social support system  Outcome: Progressing     Problem: Nutrition/Hydration-ADULT  Goal: Nutrient/Hydration intake appropriate for improving, restoring or maintaining nutritional needs  Description: Monitor and assess patient's nutrition/hydration status for malnutrition  Collaborate with interdisciplinary team and initiate plan and interventions as ordered  Monitor patient's weight and dietary intake as ordered or per policy  Utilize nutrition screening tool and intervene as necessary  Determine patient's food preferences and provide high-protein, high-caloric foods as appropriate  INTERVENTIONS:  - Monitor oral intake, urinary output, labs, and treatment plans  - Assess nutrition and hydration status and recommend course of action  - Evaluate amount of meals eaten  - Assist patient with eating if necessary   - Allow adequate time for meals  - Recommend/ encourage appropriate diets, oral nutritional supplements, and vitamin/mineral supplements  - Order, calculate, and assess calorie counts as needed  - Recommend, monitor, and adjust tube feedings and TPN/PPN based on assessed needs  - Assess need for intravenous fluids  - Provide specific nutrition/hydration education as appropriate  - Include patient/family/caregiver in decisions related to nutrition  Outcome: Progressing     Problem: Knowledge Deficit  Goal: Patient/family/caregiver demonstrates understanding of disease process, treatment plan, medications, and discharge instructions  Description: Complete learning assessment and assess knowledge base    Interventions:  - Provide teaching at level of understanding  - Provide teaching via preferred learning methods  Outcome: Not Progressing

## 2021-05-23 NOTE — ASSESSMENT & PLAN NOTE
· Status post laparoscopic paraesophageal hernia repair by Dr Lauren Paula on 5/11/21  · On admission the patient endorsed that she was supposed to be on a pureed diet since her surgery for the next 8 weeks, but stated she has only been eating bread and water  · No evidence of acute abdominal pelvic process per CT results report with thickening at the GE junction attributed to recent hiatal hernia repair

## 2021-05-23 NOTE — ASSESSMENT & PLAN NOTE
· SIRS vs severe sepsis POA, as evidenced by tachycardia, fever (100 4), and lactic acidosis  · Unclear etiology  · Lactic acidosis resolved  · Blood cultures with no growth x2 after 48 hours  · COVID-19 negative  · CTA chest PE study with CT abdomen and pelvis with contrast on admission with no central through proximal segmental PE, inferior lingular atelectasis and dependent hypoventilatory changes in the upper and lower lobes, minimal fluid in diffusely patulous esophagus attributed to esophageal reflux, otherwise no evidence of acute thoracic process  No evidence of acute abdominal pelvic process  · Procalcitonin negative x3  · UA not indicative of UTI   · Ceftriaxone discontinued prior   Monitoring off abx  · Off scheduled Tylenol so as not to mask any potential fever

## 2021-05-24 VITALS
SYSTOLIC BLOOD PRESSURE: 154 MMHG | HEIGHT: 63 IN | HEART RATE: 111 BPM | OXYGEN SATURATION: 98 % | WEIGHT: 160.05 LBS | RESPIRATION RATE: 18 BRPM | DIASTOLIC BLOOD PRESSURE: 97 MMHG | TEMPERATURE: 98.1 F | BODY MASS INDEX: 28.36 KG/M2

## 2021-05-24 PROBLEM — R65.10 SIRS (SYSTEMIC INFLAMMATORY RESPONSE SYNDROME) (HCC): Status: RESOLVED | Noted: 2021-05-20 | Resolved: 2021-05-24

## 2021-05-24 LAB — SELENIUM SERPL-MCNC: 138 UG/L (ref 93–198)

## 2021-05-24 PROCEDURE — 99239 HOSP IP/OBS DSCHRG MGMT >30: CPT | Performed by: PHYSICIAN ASSISTANT

## 2021-05-24 PROCEDURE — 97164 PT RE-EVAL EST PLAN CARE: CPT

## 2021-05-24 RX ADMIN — PANTOPRAZOLE SODIUM 40 MG: 40 TABLET, DELAYED RELEASE ORAL at 08:11

## 2021-05-24 RX ADMIN — SUCRALFATE 1 G: 1 TABLET ORAL at 11:09

## 2021-05-24 RX ADMIN — SENNOSIDES 8.6 MG: 8.6 TABLET ORAL at 08:11

## 2021-05-24 RX ADMIN — LEVOTHYROXINE SODIUM 25 MCG: 25 TABLET ORAL at 05:13

## 2021-05-24 RX ADMIN — ZIPRASIDONE HYDROCHLORIDE 80 MG: 40 CAPSULE ORAL at 08:10

## 2021-05-24 RX ADMIN — SUCRALFATE 1 G: 1 TABLET ORAL at 08:10

## 2021-05-24 RX ADMIN — ENOXAPARIN SODIUM 40 MG: 40 INJECTION SUBCUTANEOUS at 08:10

## 2021-05-24 RX ADMIN — DOCUSATE SODIUM 100 MG: 100 CAPSULE, LIQUID FILLED ORAL at 08:10

## 2021-05-24 RX ADMIN — MECLIZINE HCL 12.5 MG 25 MG: 12.5 TABLET ORAL at 08:12

## 2021-05-24 RX ADMIN — FERROUS SULFATE TAB 325 MG (65 MG ELEMENTAL FE) 325 MG: 325 (65 FE) TAB at 08:11

## 2021-05-24 RX ADMIN — PAROXETINE HYDROCHLORIDE 60 MG: 20 TABLET, FILM COATED ORAL at 08:11

## 2021-05-24 RX ADMIN — ASPIRIN 81 MG: 81 TABLET, COATED ORAL at 08:10

## 2021-05-24 NOTE — PHYSICAL THERAPY NOTE
Physical Therapy Re-evaluation    Patient Name: Rochelle Coles    BHTWAN Date: 5/24/2021     Problem List  Active Problems:    Schizoaffective disorder ([facility] [address] )    Essential hypertension    Hiatal hernia with gastroesophageal reflux disease and esophagitis    Hyperlipidemia       Past Medical History  Past Medical History:   Diagnosis Date    Anxiety     Arthritis     Back pain at L4-L5 level     Schreiber esophagus     Bulimia     Colon polyp     Disease of thyroid gland     hypothyroid    Ear problems     GERD (gastroesophageal reflux disease)     History of transfusion     Hyperlipidemia     Hypothyroidism     Leukopenia     Nasal congestion     NMS (neuroleptic malignant syndrome) 01/03/2020    Pneumonia     Rheumatoid arthritis involving right hip (HCC)     Sciatica     Seizure ([facility] [address] )     due to medication mix up 8/2018 only one historically    Seizures ([facility] [address] )     Synovial cyst of lumbar spine     Vertigo     Weight gain         Past Surgical History  Past Surgical History:   Procedure Laterality Date    BONE MARROW BIOPSY      BREAST SURGERY      enlargement     CLOSED REDUCTION DISTAL FEMUR FRACTURE      COLONOSCOPY      COSMETIC SURGERY      HIP ARTHROPLASTY Right 1/2/2020    Procedure: REVISION TOTAL HIP ARTHROPLASTY WITH REPAIR OF PARIPROSTHETIC FRACTURE - POSTERIOR APPROACH;  Surgeon: Boston Mcnamara MD;  [facility];  Service: Orthopedics    OR ESOPHAGOGASTRODUODENOSCOPY TRANSORAL DIAGNOSTIC N/A 5/11/2021    Procedure: ESOPHAGOGASTRODUODENOSCOPY (EGD); Surgeon:  Андрей Eugene MD;  [facility];  Service: General    OR ESOPHAGUS SURG PROCEDURE UNLISTED N/A 5/11/2021    Procedure: FUNDOPLICATION TRANSORAL INCISIONLESS;  Surgeon: Herminia Skinner MD;  [facility];  Service: Gastroenterology    OR LAP, REPAIR PARAESOPHAGEAL HERNIA, INCL FUNDOPLASTY W/ MESH N/A 5/11/2021    Procedure: REPAIR HERNIA PARAESOPHAGEAL  LAPAROSCOPIC;  Surgeon: Oly Rossi MD;  [facility];  Service: General    [place] TOTAL HIP ARTHROPLASTY Right 12/11/2019    Procedure: ARTHROPLASTY HIP TOTAL ANTERIOR;  Surgeon: Santos Johnson MD;  [facility];  Service: Orthopedics    RHINOPLASTY      SINUS SURGERY             05/24/21 0931   PT Last Visit   PT Visit Date 05/24/21   Note Type   Note type Re-Evaluation   Pain Assessment   Pain Assessment Tool 0-10   Pain Score No Pain   Multiple Pain Sites No   Home Living   Additional Comments see IE- unchanged    Prior Function   ADL Assistance Independent   IADLs Independent   Comments I PTA- drives; I ADl's - same as IE   Restrictions/Precautions   Weight Bearing Precautions Per Order No   Braces or Orthoses   (none)   General   Additional Pertinent History PT- reeval ordered as pt reported to staff "too weak" to d/c home yesterday- on PT approach today pt reports "much better and eager to go home today    Cognition   Overall Cognitive Status WFL   Orientation Level Oriented X4   Comments pleasant - conversational, year to discharge home today  RUE Assessment   RUE Assessment WFL   LUE Assessment   LUE Assessment WFL   RLE Assessment   RLE Assessment WFL   LLE Assessment   LLE Assessment WFL   Coordination   Movements are Fluid and Coordinated 1   Light Touch   RLE Light Touch Grossly intact   LLE Light Touch Grossly intact   Bed Mobility   Supine to Sit 7  Independent   Sit to Supine 7  Independent   Transfers   Sit to Stand 7  Independent   Stand to Sit 7  Independent   Stand pivot 7  Independent   Toilet transfer 7  Independent   Additional Comments All transfers performed without assistive device     Ambulation/Elevation   Gait pattern WNL   Gait Assistance 7  Independent   Assistive Device None   Distance >400 pt able to turn and negotiate obstacles w/o AD; 0 LOB; able to p/u obbjects from floor w/o LOB    Stair Management Assistance 6  Modified independent   Stair Management Technique One rail R   Number of Stairs 8   Balance   Static Sitting Normal   Dynamic Sitting Normal   Static Standing Good   Dynamic Standing Good   Ambulatory Good  (w/o AD)   Activity Tolerance   Activity Tolerance Patient tolerated treatment well   Nurse Made Aware RN and ALLI Panchalll   Assessment   Prognosis Good   Assessment PT- reeval ordered as pt reported to staff "too weak" to d/c home yesterday- on PT approach today pt reports "much better and eager to go home"  On re-evaluation patient presents on room air  Denies pain  Patient demonstrated independent bed mobility skills independent transfers and independent gait without assistive device is greater than 400 ft  Patient was able to reach out of base of support and  objects from floor without loss of balance  Patient was able to negotiate a flight of stairs with right handrail and without difficulty  Vitals stable throughout and  patient without complaint of dizziness  From a PT standpoint patient is safe to discharge home no further physical therapy needs  D/C PT- info relayed to CM and CORY  Barriers to Discharge None   Goals   Patient Goals Go home today  Recommendation   PT Discharge Recommendation No rehabilitation needs   PT - OK to Discharge Yes   AM-PAC Basic Mobility Inpatient   Turning in Bed Without Bedrails 4   Lying on Back to Sitting on Edge of Flat Bed 4   Moving Bed to Chair 4   Standing Up From Chair 4   Walk in Room 4   Climb 3-5 Stairs 4   Basic Mobility Inpatient Raw Score 24   Basic Mobility Standardized Score 57 68   The patient's AM-PAC Basic Mobility Inpatient Short Form Raw Score is 24, Standardized Score is 57 68  A standardized score greater than 42 9 suggests the patient may benefit from discharge to home  Please also refer to the recommendation of the Physical Therapist for safe discharge planning

## 2021-05-24 NOTE — CASE MANAGEMENT
CM was notified that pt is ready for d/c  Pt is being d/c home with [facility]  Pt requested transportation and CM scheduled a [employer] ride for the pt  Pt signed waiver and CM provided pt with a copy  CM will follow

## 2021-05-24 NOTE — PLAN OF CARE
Problem: Potential for Falls  Goal: Patient will remain free of falls  Description: INTERVENTIONS:  - Assess patient frequently for physical needs  -  Identify cognitive and physical deficits and behaviors that affect risk of falls    -  [place] fall precautions as indicated by assessment   - Educate patient/family on patient safety including physical limitations  - Instruct patient to call for assistance with activity based on assessment  - Modify environment to reduce risk of injury  - Consider OT/PT consult to assist with strengthening/mobility  Outcome: Progressing     Problem: Prexisting or High Potential for Compromised Skin Integrity  Goal: Skin integrity is maintained or improved  Description: INTERVENTIONS:  - Identify patients at risk for skin breakdown  - Assess and monitor skin integrity  - Assess and monitor nutrition and hydration status  - Monitor labs   - Assess for incontinence   - Turn and reposition patient  - Assist with mobility/ambulation  - Relieve pressure over bony prominences  - Avoid friction and shearing  - Provide appropriate hygiene as needed including keeping skin clean and dry  - Evaluate need for skin moisturizer/barrier cream  - Collaborate with interdisciplinary team   - Patient/family teaching  - Consider wound care consult   Outcome: Progressing     Problem: PAIN - ADULT  Goal: Verbalizes/displays adequate comfort level or baseline comfort level  Description: Interventions:  - Encourage patient to monitor pain and request assistance  - Assess pain using appropriate pain scale  - Administer analgesics based on type and severity of pain and evaluate response  - Implement non-pharmacological measures as appropriate and evaluate response  - Consider cultural and social influences on pain and pain management  - Notify physician/advanced practitioner if interventions unsuccessful or patient reports new pain  Outcome: Progressing     Problem: INFECTION - ADULT  Goal: Absence or prevention of progression during hospitalization  Description: INTERVENTIONS:  - Assess and monitor for signs and symptoms of infection  - Monitor lab/diagnostic results  - Monitor all insertion sites, i e  indwelling lines, tubes, and drains  - Monitor endotracheal if appropriate and nasal secretions for changes in amount and color  - [place] appropriate cooling/warming therapies per order  - Administer medications as ordered  - Instruct and encourage patient and family to use good hand hygiene technique  - Identify and instruct in appropriate isolation precautions for identified infection/condition  Outcome: Progressing  Goal: Absence of fever/infection during neutropenic period  Description: INTERVENTIONS:  - Monitor WBC    Outcome: Progressing     Problem: SAFETY ADULT  Goal: Patient will remain free of falls  Description: INTERVENTIONS:  - Assess patient frequently for physical needs  -  Identify cognitive and physical deficits and behaviors that affect risk of falls    -  [place] fall precautions as indicated by assessment   - Educate patient/family on patient safety including physical limitations  - Instruct patient to call for assistance with activity based on assessment  - Modify environment to reduce risk of injury  - Consider OT/PT consult to assist with strengthening/mobility  Outcome: Progressing  Goal: Maintain or return to baseline ADL function  Description: INTERVENTIONS:  -  Assess patient's ability to carry out ADLs; assess patient's baseline for ADL function and identify physical deficits which impact ability to perform ADLs (bathing, care of mouth/teeth, toileting, grooming, dressing, etc )  - Assess/evaluate cause of self-care deficits   - Assess range of motion  - Assess patient's mobility; develop plan if impaired  - Assess patient's need for assistive devices and provide as appropriate  - Encourage maximum independence but intervene and supervise when necessary  - Involve family in performance of ADLs  - Assess for home care needs following discharge   - Consider OT consult to assist with ADL evaluation and planning for discharge  - Provide patient education as appropriate  Outcome: Progressing  Goal: Maintain or return mobility status to optimal level  Description: INTERVENTIONS:  - Assess patient's baseline mobility status (ambulation, transfers, stairs, etc )    - Identify cognitive and physical deficits and behaviors that affect mobility  - Identify mobility aids required to assist with transfers and/or ambulation (gait belt, sit-to-stand, lift, walker, cane, etc )  - [place] fall precautions as indicated by assessment  - Record patient progress and toleration of activity level on Mobility SBAR; progress patient to next Phase/Stage  - Instruct patient to call for assistance with activity based on assessment  - Consider rehabilitation consult to assist with strengthening/weightbearing, etc   Outcome: Progressing     Problem: DISCHARGE PLANNING  Goal: Discharge to home or other facility with appropriate resources  Description: INTERVENTIONS:  - Identify barriers to discharge w/patient and caregiver  - Arrange for needed discharge resources and transportation as appropriate  - Identify discharge learning needs (meds, wound care, etc )  - Arrange for interpretive services to assist at discharge as needed  - Refer to Case Management Department for coordinating discharge planning if the patient needs post-hospital services based on physician/advanced practitioner order or complex needs related to functional status, cognitive ability, or social support system  Outcome: Progressing     Problem: Knowledge Deficit  Goal: Patient/family/caregiver demonstrates understanding of disease process, treatment plan, medications, and discharge instructions  Description: Complete learning assessment and assess knowledge base    Interventions:  - Provide teaching at level of understanding  - Provide teaching via preferred learning methods  Outcome: Progressing     Problem: Nutrition/Hydration-ADULT  Goal: Nutrient/Hydration intake appropriate for improving, restoring or maintaining nutritional needs  Description: Monitor and assess patient's nutrition/hydration status for malnutrition  Collaborate with interdisciplinary team and initiate plan and interventions as ordered  Monitor patient's weight and dietary intake as ordered or per policy  Utilize nutrition screening tool and intervene as necessary  Determine patient's food preferences and provide high-protein, high-caloric foods as appropriate       INTERVENTIONS:  - Monitor oral intake, urinary output, labs, and treatment plans  - Assess nutrition and hydration status and recommend course of action  - Evaluate amount of meals eaten  - Assist patient with eating if necessary   - Allow adequate time for meals  - Recommend/ encourage appropriate diets, oral nutritional supplements, and vitamin/mineral supplements  - Order, calculate, and assess calorie counts as needed  - Recommend, monitor, and adjust tube feedings and TPN/PPN based on assessed needs  - Assess need for intravenous fluids  - Provide specific nutrition/hydration education as appropriate  - Include patient/family/caregiver in decisions related to nutrition  Outcome: Progressing

## 2021-05-24 NOTE — PLAN OF CARE
Problem: Potential for Falls  Goal: Patient will remain free of falls  Description: INTERVENTIONS:  - Assess patient frequently for physical needs  -  Identify cognitive and physical deficits and behaviors that affect risk of falls    -  [place] fall precautions as indicated by assessment   - Educate patient/family on patient safety including physical limitations  - Instruct patient to call for assistance with activity based on assessment  - Modify environment to reduce risk of injury  - Consider OT/PT consult to assist with strengthening/mobility  Outcome: Completed

## 2021-05-24 NOTE — DISCHARGE SUMMARY
Discharge Summary - [facility] Internal Medicine    Patient Information: James Turcios 61 y o  female MRN: [id number]  Unit/Bed#: [facility] 923-01 Encounter: [id number]    Discharging Physician / Practitioner: Asiya Harris PA-C  PCP: Johnathan García MD  Admission Date: 5/20/2021  Discharge Date: 05/24/21    Reason for Admission: SIRS    Discharge Diagnoses:     Principal Problem (Resolved):    SIRS (systemic inflammatory response syndrome) ([facility] )  Active Problems:    Hiatal hernia with gastroesophageal reflux disease and esophagitis    Schizoaffective disorder ([facility] )    Hyperlipidemia    Essential hypertension      Consultations During Hospital Stay:  · SLP  · PT  · OT  · CM    Procedures Performed:   · PE study with CT abdomen and pelvis with contrast  · Blood cultures  · COVID  · CMP/BMP  · CK  · Troponin  · Lactic acid  · CBC  · UA  · Procalcitonin    Significant Findings:   · PE study with CT abdomen and pelvis with contrast:  No central through proximal segmental pulmonary embolus  Limited evaluation of the peripheral pulmonary arteries  Inferior lingular atelectasis and dependent hypoventilatory changes in the upper and lower lobes  Minimal fluid in diffusely patulous esophagus attributed to gastroesophageal reflux  Otherwise no evidence of acute thoracic process  No evidence of acute abdominopelvic process  Thickening at the GE junction attributed to recent hiatal hernia repair  · Blood cultures:  No growth at 4 days x2  · COVID:  Negative  · Hyponatremia, resolved  · Hypokalemia, resolved  · Hypophosphatemia  · Hypomagnesemia   · CK: 215  · Troponin: <0 02  · Lactic acid: 3 4, 1 9, 1 6  · Procalcitonin:  0 08, 0 06, 0 07    Incidental Findings:   · None    Test Results Pending at Discharge (will require follow up):    · None     Outpatient Tests Requested:  · Follow up with Surgeon  · Follow up with PCP  · Follow up with Psych    Complications:  None    Hospital Course:     James Turcios is a 61 y o  female with recent laparoscopic paraesophageal hernia repair 5/11/21, schizoaffective disorder, and hyperlipidemia who originally presented to the hospital on 5/20/2021 due to SOB  She also noted feeling fever-kimi  She admitted to not taking her psychiatric medications  She also reported that she was supposed to be on a liquid diet post-op but was only eating bread and water  She met SIRS criteria on admission as evidenced by tachycardia and fever as well as lactic acidosis which resolved  Etiology unclear, was initially suspected to be secondary to possible pneumonia and she was placed on ceftriaxone however CT chest with no evidence of acute thoracic or abdominal pelvic process  Furthermore, she was without leukocytosis and procalcitonin negative x3  Ceftriaxone was discontinued and she was monitored off antibiotics  Scheduled Tylenol was also discontinued does not mask any fever  Fever did not recur  COVID-19 was negative  Blood cultures remain negative x2 after 4 days  No clear infectious source identified  She tolerated full liquid surgical diet  The patient reports that she has her psychiatric medications at home and was already working with home health care to establish care with a new psychiatrist as hers recently moved to [facility]  She reports that she has an upcoming outpatient appoint with her surgeon and she is encouraged to keep this appointment  PT evaluated the patient on day of discharge and informed me that she could be discharged home with no rehab needs  Condition at Discharge: stable     Discharge Day Visit / Exam:     Subjective:    Ms Sanam Evans reports feeling stronger today and states that she is ready to go home  She reports that she has her psychiatric medications at home and was already working with home health to establish care with a new psychiatrist   She denies hallucinations  She denies suicidal or homicidal ideation  She denies chest or abdominal pain    She denies any leakage from her recent surgical sites  She denies shortness of breath or fever  She reports that she is going to go to the store for food for her liquid diet    Vitals: Blood Pressure: 154/97 (05/24/21 0750)  Pulse: (!) 111 (05/24/21 0750)  Temperature: 98 1 °F (36 7 °C) (05/24/21 0750)  Temp Source: Rectal (05/20/21 1013)  Respirations: 18 (05/23/21 2143)  Height: 5' 3" (160 cm)(per records) (05/21/21 1540)  Weight - Scale: 72 6 kg (160 lb 0 9 oz) (05/20/21 1013)  SpO2: 98 % (05/24/21 0750)  Exam:   Physical Exam  Vitals signs and nursing note reviewed  Constitutional:       Comments: Patient seen sitting upright comfortably resting in bedside chair  Pleasant and cooperative   Cardiovascular:      Rate and Rhythm: Normal rate and regular rhythm  Pulmonary:      Effort: Pulmonary effort is normal  No respiratory distress  Breath sounds: Normal breath sounds  Abdominal:      General: Bowel sounds are normal       Palpations: Abdomen is soft  Comments: Surgical incision sites scabbed without drainage or erythema   Musculoskeletal:      Right lower leg: No edema  Left lower leg: No edema  Skin:     General: Skin is warm  Neurological:      Mental Status: She is alert  Comments: She is alert and oriented to person, place, time   Psychiatric:      Comments: Denies hallucinations, SI or HI         Discharge instructions/Information to patient and family:   See after visit summary for information provided to patient and family  Provisions for Follow-Up Care:  See after visit summary for information related to follow-up care and any pertinent home health orders  Disposition:     Home    For Discharges to [facility] SNF:   · Not Applicable to this Patient - Not Applicable to this Patient    Planned Readmission: none     Discharge Statement:  I spent 40 minutes discharging the patient  This time was spent on the day of discharge   I had direct contact with the patient on the day of discharge  Greater than 50% of the total time was spent examining patient, answering all patient questions, arranging and discussing plan of care with patient as well as directly providing post-discharge instructions  Additional time then spent on discharge activities  Discharge Medications:  See after visit summary for reconciled discharge medications provided to patient and family  ** Please Note: Dragon 360 Dictation voice to text software may have been used in the creation of this document   **

## 2021-05-25 ENCOUNTER — TRANSITIONAL CARE MANAGEMENT (OUTPATIENT)
Dept: INTERNAL MEDICINE CLINIC | Facility: CLINIC | Age: 61
End: 2021-05-25

## 2021-05-25 DIAGNOSIS — K21.00 GASTROESOPHAGEAL REFLUX DISEASE WITH ESOPHAGITIS WITHOUT HEMORRHAGE: Primary | Chronic | ICD-10-CM

## 2021-05-25 LAB
BACTERIA BLD CULT: NORMAL
BACTERIA BLD CULT: NORMAL

## 2021-05-25 RX ORDER — SUCRALFATE 1 G/1
1 TABLET ORAL 3 TIMES DAILY PRN
Qty: 90 TABLET | Refills: 3 | Status: SHIPPED | OUTPATIENT
Start: 2021-05-25 | End: 2021-06-03

## 2021-05-25 NOTE — UTILIZATION REVIEW
Notification of Discharge   This is a Notification of Discharge from our facility [address]  Please be advised that this patient has been discharge from our facility  Below you will find the admission and discharge date and time including the patients disposition  UTILIZATION REVIEW CONTACT:  Tamir Contreras  Utilization   Network Utilization Review Department  Phone: [phone] x carefully listen to the prompts  All voicemails are confidential   Email: [email or web address]     PHYSICIAN ADVISORY SERVICES:  FOR XOPO-YX-RURQ REVIEW - MEDICAL NECESSITY DENIAL  Phone: [phone]  Fax: [phone]  Email: [email or web address]     PRESENTATION DATE: 5/20/2021 10:06 AM  OBERVATION ADMISSION DATE:   INPATIENT ADMISSION DATE: 5/20/21 1328   DISCHARGE DATE: 5/24/2021  3:23 PM  DISPOSITION: Home with [place] with [address] [place] [address] Road INFORMATION:  Send all requests for admission clinical reviews, approved or denied determinations and any other requests to dedicated fax number below belonging to the campus where the patient is receiving treatment   List of dedicated fax numbers:  [address] DENIALS (Administrative/Medical Necessity) [phone]   [address] [place] [address] (Maternity/NICU/Pediatrics) [phone]   Nicole Pearl [phone]   Lynnette Gar [phone]   Micheline Corona [phone]   Hettie [facility] [address] [facility] [phone]   [facility]  [phone]   [address] [facility] [address]  2401 [facility] [address] [facility] [phone]

## 2021-05-27 ENCOUNTER — TELEPHONE (OUTPATIENT)
Dept: GASTROENTEROLOGY | Facility: CLINIC | Age: 61
End: 2021-05-27

## 2021-05-27 NOTE — TELEPHONE ENCOUNTER
1) Pt would like to know how long she must stay on liquid diet? 2) She needs to schedule a FU with you, availability is end of August, she would like to see you soon  Where can I put her?     Please advise  Thank you

## 2021-05-28 ENCOUNTER — PATIENT OUTREACH (OUTPATIENT)
Dept: INTERNAL MEDICINE CLINIC | Facility: CLINIC | Age: 61
End: 2021-05-28

## 2021-05-28 ENCOUNTER — OFFICE VISIT (OUTPATIENT)
Dept: SURGERY | Facility: CLINIC | Age: 61
End: 2021-05-28

## 2021-05-28 VITALS — TEMPERATURE: 97.5 F | HEIGHT: 63 IN | HEART RATE: 68 BPM | WEIGHT: 165 LBS | BODY MASS INDEX: 29.23 KG/M2

## 2021-05-28 DIAGNOSIS — K21.00 GASTROESOPHAGEAL REFLUX DISEASE WITH ESOPHAGITIS WITHOUT HEMORRHAGE: Primary | Chronic | ICD-10-CM

## 2021-05-28 DIAGNOSIS — R42 DIZZINESS: ICD-10-CM

## 2021-05-28 PROCEDURE — 99024 POSTOP FOLLOW-UP VISIT: CPT | Performed by: SURGERY

## 2021-05-28 NOTE — PROGRESS NOTES
Assessment/Plan:    Gastroesophageal reflux disease with esophagitis without hemorrhage  28-year-old female status post laparoscopic paraesophageal hernia repair with intraoperative TIF on 05/11/2021, doing well  Plan:  Despite a brief hospitalization for shortness of breath, with a workup that was unrevealing, if she is doing well at this point  She has been tolerating full liquids without any evidence of dysphagia and has minimal reflux symptoms  She will continue on her PPI for now, and will follow up with Dr Kendra Benson next week  I would like to see her back at 3 months postoperatively for an additional check  She is okay to advance to the next step of her post TIF diet, which is soft foods for 2 weeks  To return to clinic in 2 and half months  Diagnoses and all orders for this visit:    Gastroesophageal reflux disease with esophagitis without hemorrhage          Subjective:      Patient ID: Omar Rodriguez is a 61 y o  female  28-year-old female status post laparoscopic paraesophageal hernia repair with intraoperative TIF on 05/11/2021  Overall at today's visit she is doing quite well  Unfortunately she was briefly readmitted postoperatively due to some complaints of shortness of breath  Her workup at that time revealed no significant pathology, and she was discharged home  She has since been adhering to her postoperative diet of full liquids, without issue  She denies any nausea or vomiting, and has had no dysphagia  She denies any significant reflux symptoms this time  She does have some mild left upper quadrant pain which she states started she lifted her bag garbage to take to the curve which was extremely heavy  I reiterated the 20 lb lifting restriction surgery  She is moving her bowels normally  Her GERD HRQL score at today's visit was 5, with overall neutral satisfaction to her current condition        The following portions of the patient's history were reviewed and updated as appropriate:   She  has a past medical history of Anxiety, Arthritis, Back pain at L4-L5 level, Schreiber esophagus, Bulimia, Colon polyp, Disease of thyroid gland, Ear problems, GERD (gastroesophageal reflux disease), History of transfusion, Hyperlipidemia, Hypothyroidism, Leukopenia, Nasal congestion, NMS (neuroleptic malignant syndrome) (01/03/2020), Pneumonia, Rheumatoid arthritis involving right hip ([address] ), Sciatica, Seizure ([address] ), Seizures ([address] ), Synovial cyst of lumbar spine, Vertigo, and Weight gain    She   Patient Active Problem List    Diagnosis Date Noted    Hyperlipidemia 05/20/2021    Word finding difficulty 05/13/2021    Hiatal hernia with gastroesophageal reflux disease and esophagitis 01/08/2021    Polyp of colon 01/08/2021    Melena 11/22/2020    Cellulitis of left upper extremity 09/25/2020    Erosive esophagitis 09/25/2020    Rash 09/22/2020    Symptomatic anemia 09/21/2020    Multiple excoriations 09/21/2020    Acute non-recurrent maxillary sinusitis 06/01/2020    Fever 05/19/2020    Medicare annual wellness visit, initial 04/29/2020    Dizziness 03/30/2020    Fall at home 01/27/2020    Gastroesophageal reflux disease with esophagitis without hemorrhage 01/27/2020    Hyponatremia 01/27/2020    Problem related to living arrangement 01/27/2020    NMS (neuroleptic malignant syndrome) 01/03/2020    Iron deficiency anemia due to chronic blood loss 11/14/2019    Chronic bilateral low back pain without sciatica 11/01/2019    Right hip pain 07/15/2019    Essential hypertension 06/12/2019    Elevated BP without diagnosis of hypertension 05/07/2019    Dermal hypersensitivity reaction 11/08/2018    ABIMAEL (generalized anxiety disorder) 08/21/2018    Schizoaffective disorder ([address] ) 08/16/2018    Serotonin syndrome 08/13/2018    Other fatigue 06/19/2018    Spinal stenosis of lumbar region with neurogenic claudication 06/15/2018    Lumbar spondylosis 06/15/2018    T12 compression fracture ([facility] ) 06/15/2018    Synovial cyst of lumbar facet joint 06/15/2018    Localized osteoporosis with current pathological fracture with routine healing 05/25/2018    Lumbar radiculopathy 03/19/2018    DDD (degenerative disc disease), lumbar 03/19/2018    Spondylolisthesis at L4-L5 level 03/19/2018    Anxiety 10/31/2016    Acquired hypothyroidism 10/31/2016    Constipation 04/10/2014    Bulimia nervosa in remission 03/19/2014     She  has a past surgical history that includes Rhinoplasty; Bone marrow biopsy; Breast surgery; Colonoscopy; pr total hip arthroplasty (Right, 12/11/2019); Hip Arthroplasty (Right, 1/2/2020); Sinus surgery; Cosmetic surgery; Closed reduction distal femur fracture; pr lap, repair paraesophageal hernia, incl fundoplasty w/ mesh (N/A, 5/11/2021); pr esophagogastroduodenoscopy transoral diagnostic (N/A, 5/11/2021); and pr esophagus surg procedure unlisted (N/A, 5/11/2021)  Her family history includes Colon cancer in her maternal grandmother; Esophageal cancer in her father; Uterine cancer in her mother  She  reports that she has never smoked  She has never used smokeless tobacco  She reports previous alcohol use  She reports previous drug use    Current Outpatient Medications   Medication Sig Dispense Refill    acetaminophen (TYLENOL) 325 mg tablet Take 3 tablets (975 mg total) by mouth every 8 (eight) hours 30 tablet 0    aspirin (ECOTRIN LOW STRENGTH) 81 mg EC tablet Take 1 tablet (81 mg total) by mouth daily 30 tablet 0    atorvastatin (LIPITOR) 40 mg tablet Take 1 tablet (40 mg total) by mouth every evening 30 tablet 0    ferrous sulfate 325 (65 Fe) mg tablet Take 1 tablet (325 mg total) by mouth 2 (two) times a day with meals 60 tablet 1    levothyroxine 25 mcg tablet Take 1 tablet (25 mcg total) by mouth daily 90 tablet 3    LORazepam (ATIVAN) 2 mg tablet Take 1 tablet (2 mg total) by mouth every 6 (six) hours as needed for anxiety 120 tablet 1    meclizine (ANTIVERT) 25 mg tablet Take 1 tablet (25 mg total) by mouth 3 (three) times a day as needed for dizziness 30 tablet 0    Multiple Vitamin (MULTIVITAMIN) capsule Take 1 capsule by mouth daily 30 capsule 0    ondansetron (ZOFRAN-ODT) 4 mg disintegrating tablet Take 1 tablet (4 mg total) by mouth every 6 (six) hours as needed for nausea or vomiting 20 tablet 0    pantoprazole (PROTONIX) 40 mg tablet Take 1 tablet (40 mg total) by mouth 2 (two) times a day before meals for 14 days 28 tablet 0    PARoxetine (PAXIL) 30 mg tablet Take 2 tablets (60 mg total) by mouth daily 180 tablet 3    QUEtiapine (SEROquel) 400 MG tablet Take 1 tablet (400 mg total) by mouth daily at bedtime 90 tablet 3    sucralfate (CARAFATE) 1 g tablet Take 1 tablet (1 g total) by mouth 3 (three) times a day as needed (indigestion) 90 tablet 3    sucralfate (CARAFATE) 1 g/10 mL suspension Take 10 mL (1 g total) by mouth 4 (four) times a day for 14 days 560 mL 0    triamcinolone (KENALOG) 0 1 % cream Apply 1 application topically 2 (two) times a day To affected area 80 g 5    ziprasidone (GEODON) 80 mg capsule Take 1 capsule (80 mg total) by mouth daily 90 capsule 1    pantoprazole (PROTONIX) 40 mg tablet Take 1 tablet (40 mg total) by mouth 2 (two) times a day (Patient not taking: Reported on 5/28/2021) 60 tablet 0     No current facility-administered medications for this visit        Current Outpatient Medications on File Prior to Visit   Medication Sig    acetaminophen (TYLENOL) 325 mg tablet Take 3 tablets (975 mg total) by mouth every 8 (eight) hours    aspirin (ECOTRIN LOW STRENGTH) 81 mg EC tablet Take 1 tablet (81 mg total) by mouth daily    atorvastatin (LIPITOR) 40 mg tablet Take 1 tablet (40 mg total) by mouth every evening    ferrous sulfate 325 (65 Fe) mg tablet Take 1 tablet (325 mg total) by mouth 2 (two) times a day with meals    levothyroxine 25 mcg tablet Take 1 tablet (25 mcg total) by mouth daily    LORazepam (ATIVAN) 2 mg tablet Take 1 tablet (2 mg total) by mouth every 6 (six) hours as needed for anxiety    meclizine (ANTIVERT) 25 mg tablet Take 1 tablet (25 mg total) by mouth 3 (three) times a day as needed for dizziness    Multiple Vitamin (MULTIVITAMIN) capsule Take 1 capsule by mouth daily    ondansetron (ZOFRAN-ODT) 4 mg disintegrating tablet Take 1 tablet (4 mg total) by mouth every 6 (six) hours as needed for nausea or vomiting    pantoprazole (PROTONIX) 40 mg tablet Take 1 tablet (40 mg total) by mouth 2 (two) times a day before meals for 14 days    PARoxetine (PAXIL) 30 mg tablet Take 2 tablets (60 mg total) by mouth daily    QUEtiapine (SEROquel) 400 MG tablet Take 1 tablet (400 mg total) by mouth daily at bedtime    sucralfate (CARAFATE) 1 g tablet Take 1 tablet (1 g total) by mouth 3 (three) times a day as needed (indigestion)    sucralfate (CARAFATE) 1 g/10 mL suspension Take 10 mL (1 g total) by mouth 4 (four) times a day for 14 days    triamcinolone (KENALOG) 0 1 % cream Apply 1 application topically 2 (two) times a day To affected area    ziprasidone (GEODON) 80 mg capsule Take 1 capsule (80 mg total) by mouth daily    pantoprazole (PROTONIX) 40 mg tablet Take 1 tablet (40 mg total) by mouth 2 (two) times a day (Patient not taking: Reported on 5/28/2021)     No current facility-administered medications on file prior to visit  She is allergic to risperdal [risperidone]; zyprexa [olanzapine]; and latex       Review of Systems   Constitutional: Negative for chills, fatigue and fever  HENT: Negative for ear pain, facial swelling, sinus pressure and sinus pain  Eyes: Negative for pain  Respiratory: Negative for cough, shortness of breath and wheezing  Cardiovascular: Negative for chest pain  Gastrointestinal: Positive for abdominal pain  Negative for constipation, diarrhea, nausea and vomiting  Endocrine: Negative for cold intolerance and heat intolerance  Genitourinary: Negative for dysuria and flank pain  Musculoskeletal: Negative for back pain and neck pain  Skin: Negative for wound  Neurological: Negative for syncope, facial asymmetry, light-headedness and numbness  Psychiatric/Behavioral: Negative for behavioral problems, confusion and suicidal ideas  Objective:      Pulse 68   Temp 97 5 °F (36 4 °C)   Ht 5' 3" (1 6 m)   Wt 74 8 kg (165 lb)   BMI 29 23 kg/m²          Physical Exam  Vitals signs and nursing note reviewed  Constitutional:       General: She is not in acute distress  Appearance: Normal appearance  She is not toxic-appearing  HENT:      Head: Normocephalic and atraumatic  Mouth/Throat:      Mouth: Mucous membranes are moist    Eyes:      Extraocular Movements: Extraocular movements intact  Pupils: Pupils are equal, round, and reactive to light  Neck:      Musculoskeletal: Normal range of motion and neck supple  Cardiovascular:      Rate and Rhythm: Normal rate and regular rhythm  Pulses: Normal pulses  Pulmonary:      Effort: Pulmonary effort is normal  No respiratory distress  Breath sounds: Normal breath sounds  No wheezing  Abdominal:      General: There is no distension  Palpations: Abdomen is soft  There is no mass  Tenderness: There is no abdominal tenderness  There is no guarding or rebound  Hernia: No hernia is present  Comments: No tenderness on exam, well-healed laparoscopic port sites   Musculoskeletal: Normal range of motion  General: No swelling or deformity  Right lower leg: No edema  Left lower leg: No edema  Skin:     General: Skin is warm and dry  Coloration: Skin is not jaundiced  Neurological:      General: No focal deficit present  Mental Status: She is alert and oriented to person, place, and time     Psychiatric:         Mood and Affect: Mood normal          Behavior: Behavior normal

## 2021-05-28 NOTE — PROGRESS NOTES
2nd outreach attempt to patient to check status and assess additional needs  JERAMY providing [facility] services at this time  Referral received due to food insecurity concerns; previously spoke with [address] [place] [address]  ASIM who confirmed patient and patient's mother have adequate food supply in the home at this time  Unable to leave voicemail as mailbox is full; will attempt additional outreach at later date

## 2021-05-28 NOTE — ASSESSMENT & PLAN NOTE
77-year-old female status post laparoscopic paraesophageal hernia repair with intraoperative TIF on 05/11/2021, doing well  Plan:  Despite a brief hospitalization for shortness of breath, with a workup that was unrevealing, if she is doing well at this point  She has been tolerating full liquids without any evidence of dysphagia and has minimal reflux symptoms  She will continue on her PPI for now, and will follow up with Dr Mateusz Forrest next week  I would like to see her back at 3 months postoperatively for an additional check  She is okay to advance to the next step of her post TIF diet, which is soft foods for 2 weeks  To return to clinic in 2 and half months

## 2021-05-29 RX ORDER — MECLIZINE HYDROCHLORIDE 25 MG/1
25 TABLET ORAL 3 TIMES DAILY PRN
Qty: 30 TABLET | Refills: 0 | Status: ON HOLD | OUTPATIENT
Start: 2021-05-29 | End: 2022-01-12 | Stop reason: CLARIF

## 2021-06-01 ENCOUNTER — OFFICE VISIT (OUTPATIENT)
Dept: INTERNAL MEDICINE CLINIC | Facility: CLINIC | Age: 61
End: 2021-06-01
Payer: COMMERCIAL

## 2021-06-01 VITALS
HEART RATE: 90 BPM | WEIGHT: 166 LBS | DIASTOLIC BLOOD PRESSURE: 102 MMHG | HEIGHT: 63 IN | SYSTOLIC BLOOD PRESSURE: 164 MMHG | OXYGEN SATURATION: 99 % | BODY MASS INDEX: 29.41 KG/M2

## 2021-06-01 DIAGNOSIS — K44.9 HIATAL HERNIA WITH GASTROESOPHAGEAL REFLUX DISEASE AND ESOPHAGITIS: Primary | ICD-10-CM

## 2021-06-01 DIAGNOSIS — F50.2 BULIMIA NERVOSA IN REMISSION: Chronic | ICD-10-CM

## 2021-06-01 DIAGNOSIS — F25.9 SCHIZOAFFECTIVE DISORDER, UNSPECIFIED TYPE (HCC): ICD-10-CM

## 2021-06-01 DIAGNOSIS — F41.9 ANXIETY: ICD-10-CM

## 2021-06-01 DIAGNOSIS — E78.2 MIXED HYPERLIPIDEMIA: ICD-10-CM

## 2021-06-01 DIAGNOSIS — E03.9 ACQUIRED HYPOTHYROIDISM: ICD-10-CM

## 2021-06-01 DIAGNOSIS — K21.00 HIATAL HERNIA WITH GASTROESOPHAGEAL REFLUX DISEASE AND ESOPHAGITIS: Primary | ICD-10-CM

## 2021-06-01 PROCEDURE — 1111F DSCHRG MED/CURRENT MED MERGE: CPT | Performed by: INTERNAL MEDICINE

## 2021-06-01 PROCEDURE — 99214 OFFICE O/P EST MOD 30 MIN: CPT | Performed by: INTERNAL MEDICINE

## 2021-06-01 NOTE — PROGRESS NOTES
Assessment/Plan:    Hiatal hernia with gastroesophageal reflux disease and esophagitis  Status post laparoscopic paraesophageal hernia repair 5/11/21  Continue with diet as per surgery, and follow-up surgery    Acquired hypothyroidism  Continue levothyroxine    Mixed hyperlipidemia  Continue atorvastatin       Diagnoses and all orders for this visit:    Hiatal hernia with gastroesophageal reflux disease and esophagitis    Schizoaffective disorder, unspecified type ([facility] [address] )  -     Ambulatory referral to Psychology; Future    Anxiety  -     Ambulatory referral to Psychology; Future    Bulimia nervosa in remission  -     Ambulatory referral to Psychology; Future    Acquired hypothyroidism    Mixed hyperlipidemia          Subjective:      Patient ID: Nidia Grider is a 61 y o  female  Pt went back to the hospital for feeling SOB and "feverish "  Workup was unrevealing of etiology  Since out of hospital pt has not been feeling SOB or "feverish "  About 4 days ago pt felt pain in the left upper quadrant where she was worried she tore her stiches  No vomiting, bowels moving ok  She is following a liquid diet  She had follow up with surgery 5/28/21, and has [facility]NA coming twice a week  No GERD symptoms, no difficulty swallowing, no calf pain or swelling, no cough  Pt is back on her psych meds, and she is in the process of scheduling an appointment, and she also wants to see a psychologist        The following portions of the patient's history were reviewed and updated as appropriate: allergies, current medications, past family history, past medical history, past social history, past surgical history and problem list     Review of Systems   Constitutional: Negative for chills, fatigue and fever  HENT: Negative for congestion, nosebleeds, postnasal drip, sore throat and trouble swallowing  Eyes: Negative for pain  Respiratory: Negative for cough, chest tightness, shortness of breath and wheezing  Cardiovascular: Negative for chest pain, palpitations and leg swelling  Gastrointestinal: Positive for abdominal pain (off and on)  Negative for blood in stool, constipation, diarrhea, nausea and vomiting  Endocrine: Negative for polydipsia and polyuria  Genitourinary: Negative for dysuria, flank pain and hematuria  Musculoskeletal: Negative for arthralgias  Skin: Negative for rash  Neurological: Negative for dizziness, tremors, light-headedness and headaches  Hematological: Does not bruise/bleed easily  Psychiatric/Behavioral: Negative for confusion and dysphoric mood  The patient is not nervous/anxious  Objective:      BP (!) 164/102   Pulse 90   Ht 5' 3" (1 6 m)   Wt 75 3 kg (166 lb)   SpO2 99%   BMI 29 41 kg/m²          Physical Exam  Vitals signs reviewed  Constitutional:       General: She is not in acute distress  Appearance: Normal appearance  She is well-developed  HENT:      Head: Normocephalic and atraumatic  Right Ear: External ear normal       Left Ear: External ear normal    Eyes:      General: No scleral icterus  Conjunctiva/sclera: Conjunctivae normal    Neck:      Musculoskeletal: Normal range of motion and neck supple  Thyroid: No thyromegaly  Trachea: No tracheal deviation  Cardiovascular:      Rate and Rhythm: Normal rate and regular rhythm  Heart sounds: Normal heart sounds  No murmur  Pulmonary:      Effort: Pulmonary effort is normal  No respiratory distress  Breath sounds: Normal breath sounds  No wheezing or rales  Abdominal:      General: Bowel sounds are normal       Palpations: Abdomen is soft  Tenderness: There is no abdominal tenderness  There is no guarding or rebound  Musculoskeletal:      Right lower leg: No edema  Left lower leg: No edema  Lymphadenopathy:      Cervical: No cervical adenopathy  Neurological:      General: No focal deficit present        Mental Status: She is alert and oriented to person, place, and time  Psychiatric:         Mood and Affect: Mood normal          Behavior: Behavior normal          Thought Content:  Thought content normal          Judgment: Judgment normal

## 2021-06-01 NOTE — ASSESSMENT & PLAN NOTE
Status post laparoscopic paraesophageal hernia repair 5/11/21    Continue with diet as per surgery, and follow-up surgery

## 2021-06-01 NOTE — PATIENT INSTRUCTIONS
Problem List Items Addressed This Visit        Digestive    Hiatal hernia with gastroesophageal reflux disease and esophagitis - Primary     Status post laparoscopic paraesophageal hernia repair 5/11/21    Continue with diet as per surgery, and follow-up surgery            Endocrine    Acquired hypothyroidism     Continue levothyroxine            Other    Bulimia nervosa in remission (Chronic)    Relevant Orders    Ambulatory referral to Psychology    Anxiety    Relevant Orders    Ambulatory referral to Psychology    Schizoaffective disorder [facility])    Relevant Orders    Ambulatory referral to Psychology    Mixed hyperlipidemia     Continue atorvastatin

## 2021-06-02 ENCOUNTER — TELEPHONE (OUTPATIENT)
Dept: INTERNAL MEDICINE CLINIC | Facility: CLINIC | Age: 61
End: 2021-06-02

## 2021-06-02 ENCOUNTER — TELEPHONE (OUTPATIENT)
Dept: SURGERY | Facility: CLINIC | Age: 61
End: 2021-06-02

## 2021-06-02 NOTE — TELEPHONE ENCOUNTER
The patient had a tooth pulled yesterday  Her surgeon gave her an antibiotic for one day  She does not know what the surgeon put her on  The patient would like for you to prescribe an antibiotic for her  She said she should be on an antibiotic after her surgery due to her hip surgery  Please advise   Thank you

## 2021-06-02 NOTE — TELEPHONE ENCOUNTER
MARCIA Kirkpatrick, called stating she was in to see patient at her home and patient is c/o Left Upper Quadrant pain at incision  When patient has any movement or Abdominal Movement her pain level is 10  Kaya states that the incision is hard and a little raised  The patient had a tooth pulled yesterday and was put on Antibiotics and it took most of the pain away and was able to sleep on her side since the surgery  Pain level is now at 3  Please advise

## 2021-06-02 NOTE — TELEPHONE ENCOUNTER
I called patient and reviewed her issues  No need for additional antibiotics, she was given pre procedure antibiotics    She was instructed to keep an eye out for any fevers or change her condition, and reach out if something were to occur

## 2021-06-03 ENCOUNTER — OFFICE VISIT (OUTPATIENT)
Dept: GASTROENTEROLOGY | Facility: CLINIC | Age: 61
End: 2021-06-03
Payer: COMMERCIAL

## 2021-06-03 VITALS
DIASTOLIC BLOOD PRESSURE: 101 MMHG | HEIGHT: 63 IN | HEART RATE: 86 BPM | WEIGHT: 166 LBS | SYSTOLIC BLOOD PRESSURE: 151 MMHG | BODY MASS INDEX: 29.41 KG/M2

## 2021-06-03 DIAGNOSIS — K21.00 GASTROESOPHAGEAL REFLUX DISEASE WITH ESOPHAGITIS WITHOUT HEMORRHAGE: ICD-10-CM

## 2021-06-03 DIAGNOSIS — Z87.19 HISTORY OF REPAIR OF HIATAL HERNIA: ICD-10-CM

## 2021-06-03 DIAGNOSIS — R10.12 LEFT UPPER QUADRANT ABDOMINAL PAIN: Primary | ICD-10-CM

## 2021-06-03 DIAGNOSIS — Z98.890 HISTORY OF REPAIR OF HIATAL HERNIA: ICD-10-CM

## 2021-06-03 PROCEDURE — 99214 OFFICE O/P EST MOD 30 MIN: CPT | Performed by: INTERNAL MEDICINE

## 2021-06-03 PROCEDURE — 3008F BODY MASS INDEX DOCD: CPT | Performed by: INTERNAL MEDICINE

## 2021-06-03 PROCEDURE — 1036F TOBACCO NON-USER: CPT | Performed by: INTERNAL MEDICINE

## 2021-06-03 RX ORDER — AMOXICILLIN AND CLAVULANATE POTASSIUM 875; 125 MG/1; MG/1
1 TABLET, FILM COATED ORAL EVERY 12 HOURS SCHEDULED
Qty: 10 TABLET | Refills: 0 | Status: SHIPPED | OUTPATIENT
Start: 2021-06-03 | End: 2021-06-08

## 2021-06-03 NOTE — PROGRESS NOTES
[facility] - Outpatient Follow-up Note  Rochelle Coles 61 y o  female MRN: [id number]  Encounter: [id number]          ASSESSMENT AND PLAN:      1  Left upper quadrant abdominal pain  Status post hiatal hernia repair along with transoral incision less fundoplication 3 weeks ago, postprocedure she denies any heartburn, no reflux symptoms, she is still on pantoprazole twice a day and liquid Carafate 1 g 3 times a day, I advised her to discontinue Carafate and reduce pantoprazole dose to 40 mg p o  q a m  Hilda Blend She is having mild left upper quadrant pain with tenderness at the surgical incision site  I advised her to follow-up with Dr Mahnaz Paredes  She had a recent dental surgery and she was advised to take antibiotic, she was only given 1 dose of antibiotic, will advise her to continue with Augmentin for additional 5 days  - amoxicillin-clavulanate (AUGMENTIN) 875-125 mg per tablet; Take 1 tablet by mouth every 12 (twelve) hours for 5 days  Dispense: 10 tablet; Refill: 0    2  History of repair of hiatal hernia   3-4 cm size hiatal hernia with severe reflux esophagitis, status post  Laparoscopy hiatal hernia repaired followed by TIF, 520 degree fundoplication wrap was done, post surgery she noticed significant improvement in the reflux symptoms, will reduce PPI and Carafate does, she stayed on full liquid diet for 1st 2 weeks and now will advanced to soft diet for 4 weeks, she will need repeat endoscopy in 3 months to evaluate for fundoplication site  - EGD; Future    3  Gastroesophageal reflux disease with esophagitis without hemorrhage   status post C -TIF, currently denies any heartburn or reflux symptoms  - EGD;  Future    ______________________________________________________________________    SUBJECTIVE:  Patient seen and examined, she came for follow-up after transoral incision less fundoplication, she had a history of severe reflux esophagitis along with hiatal hernia, hiatal hernia repair along with TIF was done  On 05/11/2021, postprocedure she was in hospital for couple of days  She has a multiple psychiatric problem and she was stop taking her psychiatric medication which lead to multiple behavior problem  She was readmitted to the hospital also with questionable sepsis, CT scan of chest abdomen and pelvis came back okay  She currently denies any dysphagia, no odynophagia, no nighttime reflux symptoms    REVIEW OF SYSTEMS IS OTHERWISE NEGATIVE  Historical Information   Past Medical History:   Diagnosis Date    Anxiety     Arthritis     Back pain at L4-L5 level     Schreiber esophagus     Bulimia     Colon polyp     Disease of thyroid gland     hypothyroid    Ear problems     GERD (gastroesophageal reflux disease)     History of transfusion     Hyperlipidemia     Hypothyroidism     Leukopenia     Nasal congestion     NMS (neuroleptic malignant syndrome) 01/03/2020    Pneumonia     Rheumatoid arthritis involving right hip (HCC)     Sciatica     Seizure ([address] )     due to medication mix up 8/2018 only one historically    Seizures ([address] )     Synovial cyst of lumbar spine     Vertigo     Weight gain      Past Surgical History:   Procedure Laterality Date    BONE MARROW BIOPSY      BREAST SURGERY      enlargement     CLOSED REDUCTION DISTAL FEMUR FRACTURE      COLONOSCOPY      COSMETIC SURGERY      HIP ARTHROPLASTY Right 1/2/2020    Procedure: REVISION TOTAL HIP ARTHROPLASTY WITH REPAIR OF PARIPROSTHETIC FRACTURE - POSTERIOR APPROACH;  Surgeon: Skip Turcios MD;  [facility];  Service: Orthopedics    CA ESOPHAGOGASTRODUODENOSCOPY TRANSORAL DIAGNOSTIC N/A 5/11/2021    Procedure: ESOPHAGOGASTRODUODENOSCOPY (EGD); Surgeon:  Frankey Cousin, MD;  [facility];  Service: General    CA ESOPHAGUS SURG PROCEDURE UNLISTED N/A 5/11/2021    Procedure: FUNDOPLICATION TRANSORAL INCISIONLESS;  Surgeon: Hua Jimenez MD;  [facility];  Service: Gastroenterology    [place] LAP, REPAIR PARAESOPHAGEAL HERNIA, INCL FUNDOPLASTY W/ MESH N/A 5/11/2021    Procedure: REPAIR HERNIA PARAESOPHAGEAL  LAPAROSCOPIC;  Surgeon:  Sapphire Leon MD;  [facility];  Service: General    [place] TOTAL HIP ARTHROPLASTY Right 12/11/2019    Procedure: ARTHROPLASTY HIP TOTAL ANTERIOR;  Surgeon: Iqra Medeiros MD;  [facility];  Service: Orthopedics    RHINOPLASTY      SINUS SURGERY      TRANSORAL INCISIONLESS FUNDOPLICATION (TIF)  22/07/0389     Social History   Social History     Substance and Sexual Activity   Alcohol Use Not Currently    Frequency: Never    Binge frequency: Never     Social History     Substance and Sexual Activity   Drug Use Not Currently     Social History     Tobacco Use   Smoking Status Never Smoker   Smokeless Tobacco Never Used     Family History   Problem Relation Age of Onset    Uterine cancer Mother     Esophageal cancer Father     Colon cancer Maternal Grandmother        Meds/Allergies       Current Outpatient Medications:     aspirin (ECOTRIN LOW STRENGTH) 81 mg EC tablet    atorvastatin (LIPITOR) 40 mg tablet    ferrous sulfate 325 (65 Fe) mg tablet    levothyroxine 25 mcg tablet    LORazepam (ATIVAN) 2 mg tablet    meclizine (ANTIVERT) 25 mg tablet    Multiple Vitamin (MULTIVITAMIN) capsule    ondansetron (ZOFRAN-ODT) 4 mg disintegrating tablet    pantoprazole (PROTONIX) 40 mg tablet    PARoxetine (PAXIL) 30 mg tablet    QUEtiapine (SEROquel) 400 MG tablet    ziprasidone (GEODON) 80 mg capsule    acetaminophen (TYLENOL) 325 mg tablet    amoxicillin-clavulanate (AUGMENTIN) 875-125 mg per tablet    pantoprazole (PROTONIX) 40 mg tablet    triamcinolone (KENALOG) 0 1 % cream    Allergies   Allergen Reactions    Risperdal [Risperidone] Shortness Of Breath     Rapid heart beat, SOB    Zyprexa [Olanzapine] Shortness Of Breath     Rapid heartbeat    Latex Rash     Band aids, adhesives wears normal underwear, can eat bananas  USE PAPER TAPE           Objective     Blood pressure (!) 151/101, pulse 86, height 5' 3" (1 6 m), weight 75 3 kg (166 lb), not currently breastfeeding  Body mass index is 29 41 kg/m²  PHYSICAL EXAM:      General Appearance:   Alert, cooperative, no distress   HEENT:   Normocephalic, atraumatic, anicteric      Neck:  Supple, symmetrical, trachea midline   Lungs:   Clear to auscultation bilaterally; no rales, rhonchi or wheezing; respirations unlabored    Heart[de-identified]   Regular rate and rhythm; no murmur, rub, or gallop  Abdomen:   Soft, mild left upper quadrant tenderness at incision site , non-distended; normal bowel sounds; no masses, no organomegaly    Genitalia:   Deferred    Rectal:   Deferred    Extremities:  No cyanosis, clubbing or edema    Pulses:  2+ and symmetric    Skin:  No jaundice, rashes, or lesions    Lymph nodes:  No palpable cervical lymphadenopathy        Lab Results:   No visits with results within 1 Day(s) from this visit     Latest known visit with results is:   Admission on 05/20/2021, Discharged on 05/24/2021   Component Date Value    WBC 05/20/2021 7 96     RBC 05/20/2021 3 77*    Hemoglobin 05/20/2021 10 9*    Hematocrit 05/20/2021 33 1*    MCV 05/20/2021 88     MCH 05/20/2021 28 9     MCHC 05/20/2021 32 9     RDW 05/20/2021 14 4     MPV 05/20/2021 9 1     Platelets 17/56/5680 367     nRBC 05/20/2021 0     Neutrophils Relative 05/20/2021 82*    Immat GRANS % 05/20/2021 0     Lymphocytes Relative 05/20/2021 9*    Monocytes Relative 05/20/2021 7     Eosinophils Relative 05/20/2021 1     Basophils Relative 05/20/2021 1     Neutrophils Absolute 05/20/2021 6 54     Immature Grans Absolute 05/20/2021 0 03     Lymphocytes Absolute 05/20/2021 0 75     Monocytes Absolute 05/20/2021 0 54     Eosinophils Absolute 05/20/2021 0 06     Basophils Absolute 05/20/2021 0 04     Sodium 05/20/2021 135*    Potassium 05/20/2021 3 0*    Chloride 05/20/2021 98*    CO2 05/20/2021 27     ANION GAP 05/20/2021 10     BUN 05/20/2021 11     Creatinine 05/20/2021 0 79     Glucose 05/20/2021 123     Calcium 05/20/2021 10 0     Corrected Calcium 05/20/2021 10 6*    AST 05/20/2021 21     ALT 05/20/2021 36     Alkaline Phosphatase 05/20/2021 85     Total Protein 05/20/2021 6 5     Albumin 05/20/2021 3 3*    Total Bilirubin 05/20/2021 0 28     eGFR 05/20/2021 82     LACTIC ACID 05/20/2021 3 4*    Procalcitonin 05/20/2021 0 08     Protime 05/20/2021 13 0     INR 05/20/2021 0 98     PTT 05/20/2021 26     Blood Culture 05/20/2021 No Growth After 5 Days   Blood Culture 05/20/2021 No Growth After 5 Days       Color, UA 05/20/2021 Yellow     Clarity, UA 05/20/2021 Clear     Specific Gravity, UA 05/20/2021 1 010     pH, UA 05/20/2021 5 5     Leukocytes, UA 05/20/2021 Negative     Nitrite, UA 05/20/2021 Negative     Protein, UA 05/20/2021 Negative     Glucose, UA 05/20/2021 Negative     Ketones, UA 05/20/2021 Negative     Urobilinogen, UA 05/20/2021 0 2     Bilirubin, UA 05/20/2021 Negative     Blood, UA 05/20/2021 Negative     SARS-CoV-2 05/20/2021 Negative     Troponin I 05/20/2021 <0 02     LACTIC ACID 05/20/2021 1 9     Total CK 05/20/2021 215*    CK-MB Index 05/20/2021 2 1     CK-MB 05/20/2021 4 5     Phosphorus 05/20/2021 1 8*    Magnesium 05/20/2021 1 5*    LACTIC ACID 05/21/2021 1 6     Selenium, Blood 05/21/2021 138     Ventricular Rate 05/20/2021 121     Atrial Rate 05/20/2021 121     MT Interval 05/20/2021 126     QRSD Interval 05/20/2021 82     QT Interval 05/20/2021 320     QTC Interval 05/20/2021 454     P Axis 05/20/2021 45     QRS Axis 05/20/2021 18     T Wave Axis 05/20/2021 73     WBC 05/22/2021 2 67*    RBC 05/22/2021 3 54*    Hemoglobin 05/22/2021 10 1*    Hematocrit 05/22/2021 32 6*    MCV 05/22/2021 92     MCH 05/22/2021 28 5     MCHC 05/22/2021 31 0*    RDW 05/22/2021 14 7     Platelets 25/00/7155 269     MPV 05/22/2021 8 5*    Sodium 05/22/2021 141     Potassium 05/22/2021 3 6     Chloride 05/22/2021 106     CO2 05/22/2021 30     ANION GAP 05/22/2021 5     BUN 05/22/2021 6     Creatinine 05/22/2021 0 55*    Glucose 05/22/2021 80     Calcium 05/22/2021 8 8     eGFR 05/22/2021 102     Procalcitonin 05/22/2021 0 06     Procalcitonin 05/23/2021 0 07     WBC 05/23/2021 2 60*    RBC 05/23/2021 3 55*    Hemoglobin 05/23/2021 10 0*    Hematocrit 05/23/2021 31 6*    MCV 05/23/2021 89     MCH 05/23/2021 28 2     MCHC 05/23/2021 31 6     RDW 05/23/2021 14 2     Platelets 74/15/3631 281     MPV 05/23/2021 9 0     Sodium 05/23/2021 139     Potassium 05/23/2021 3 5     Chloride 05/23/2021 104     CO2 05/23/2021 30     ANION GAP 05/23/2021 5     BUN 05/23/2021 4*    Creatinine 05/23/2021 0 55*    Glucose 05/23/2021 76     Calcium 05/23/2021 9 1     eGFR 05/23/2021 102          Radiology Results:   Mri Brain Wo Contrast    Result Date: 5/13/2021  Narrative: MRI BRAIN WITHOUT CONTRAST INDICATION: CVA  COMPARISON:   None  TECHNIQUE:  Sagittal T1, axial T2, axial FLAIR, axial T1, axial [place] and axial diffusion imaging  IMAGE QUALITY:  Diagnostic  FINDINGS: BRAIN PARENCHYMA:  There is no discrete mass, mass effect or midline shift  There is no intracranial hemorrhage  There is no evidence of acute infarction and diffusion imaging is unremarkable  Small scattered hyperintensities on T2/FLAIR imaging are noted in the periventricular and subcortical white matter demonstrating an appearance that is statistically most likely to represent mild microangiopathic change  VENTRICLES:  Normal for the patient's age  SELLA AND PITUITARY GLAND:  Normal  ORBITS:  Normal  PARANASAL SINUSES:  Extensive left mastoid opacification  VASCULATURE:  Evaluation of the major intracranial vasculature demonstrates appropriate flow voids   CALVARIUM AND SKULL BASE:  Normal  EXTRACRANIAL SOFT TISSUES:  Normal      Impression: White matter changes suggestive of chronic microangiopathy  No acute intracranial pathology  Workstation performed: [id number]     Ct Stroke Alert Brain    Result Date: 5/12/2021  Narrative: CT BRAIN - STROKE ALERT PROTOCOL INDICATION:   aphasia, r/o CVA  COMPARISON:  None  TECHNIQUE:  CT examination of the brain was performed  In addition to axial images, coronal reformatted images were created and submitted for interpretation  Radiation dose length product (DLP) for this visit:  916 17 mGy-cm   This examination, like all CT scans performed in the [facility], was performed utilizing techniques to minimize radiation dose exposure, including the use of iterative  reconstruction and automated exposure control  IMAGE QUALITY:  Diagnostic  FINDINGS:  PARENCHYMA: Decreased attenuation is noted in periventricular and subcortical white matter demonstrating an appearance that is statistically most likely to represent moderate microangiopathic change  No CT signs of acute infarction  No intracranial mass, mass effect or midline shift  No acute parenchymal hemorrhage  VENTRICLES AND EXTRA-AXIAL SPACES:  Normal for patient's age  VISUALIZED ORBITS AND PARANASAL SINUSES:  Unremarkable  CALVARIUM AND EXTRACRANIAL SOFT TISSUES:   Normal      Impression: No acute intracranial abnormality  Microangiopathic changes  Findings were directly discussed with Iesha Jc on 5/12/2021 6:33 PM  Workstation performed: [id number]     Pe Study With Ct Abdomen &pelvis With Contrast    Result Date: 5/20/2021  Narrative: CT PULMONARY ANGIOGRAM OF THE CHEST AND CT ABDOMEN AND PELVIS WITH INTRAVENOUS CONTRAST INDICATION:   recent abdominal surgery, concern for pe due to sob and tachycardia  COMPARISON:  CT chest 9/26/2016 and CT abdomen and pelvis 10/23/2018 TECHNIQUE:  CT examination of the chest, abdomen and pelvis was performed    Thin section CT angiographic technique was used in the chest in order to evaluate for pulmonary embolus and coronal 3D MIP postprocessing was performed on the acquisition scanner  Axial, sagittal, and coronal 2D reformatted images were created from the source data and submitted for interpretation  Radiation dose length product (DLP) for this visit:  907 77 mGy-cm   This examination, like all CT scans performed in the [facility], was performed utilizing techniques to minimize radiation dose exposure, including the use of iterative  reconstruction and automated exposure control  IV Contrast:  100 mL of iohexol (OMNIPAQUE)  350 Enteric Contrast:  Enteric contrast was administered  FINDINGS: CHEST PULMONARY ARTERIAL TREE:  No central through proximal segmental pulmonary arteries  Evaluation of peripheral pulmonary arteries limited by suboptimal contrast opacification  LUNGS:  Inferior lingular atelectasis  Dependent hypoventilatory changes in the upper and lower lobes  No infiltrate  5 mm subpleural nodule right middle lobe image 53 series 3 unchanged as far back as September 2016  No new pulmonary nodule  Central airways are clear  PLEURA:  Unremarkable  HEART/AORTA:  Heart is not enlarged  No pericardial effusion  Coronary artery calcification  MEDIASTINUM AND FRANSICO:  Minimal fluid and diffusely patulous esophagus attributed to gastroesophageal reflux  Otherwise unremarkable  CHEST WALL AND LOWER NECK:   Bilateral breasts implants in place  ABDOMEN LIVER/BILIARY TREE:  Unremarkable  GALLBLADDER:  No calcified gallstones  No pericholecystic inflammatory change  SPLEEN:  Unremarkable  PANCREAS:  Unremarkable  ADRENAL GLANDS:  Unremarkable  KIDNEYS/URETERS:  Unremarkable  No hydronephrosis  STOMACH AND BOWEL:  Post hernia repair changes at the GE junction  No bowel obstruction  Tortuous tortuous redundant colon  Right upper quadrant cecum and ileocecal junction  APPENDIX:  No findings to suggest appendicitis  ABDOMINOPELVIC CAVITY:  No ascites  No pneumoperitoneum  No lymphadenopathy  VESSELS:  Aortoiliac calcification  No aneurysm  PELVIS REPRODUCTIVE ORGANS:  Unremarkable for patient's age  URINARY BLADDER:  Unremarkable  ABDOMINAL WALL/INGUINAL REGIONS:  Trace ventral left upper quadrant subcutaneous fat stranding attributed to recent surgery  No abnormal fluid collection  OSSEOUS STRUCTURES:  No acute fracture or osseous destructive lesion identified  Mild degenerative changes of the spine and right shoulder  Stable grade 1 degenerative anterolisthesis L4 on L5  Stable mild dextroconvex thoracic and levoconvex lumbar scoliosis  4 partially visualized right hip prosthesis and proximal femoral orthopedic hardware  Multifocal heterotopic ossification adjacent to the proximal right femur  Impression: CTA chest: No central through proximal segmental pulmonary embolus  Limited evaluation of the peripheral pulmonary arteries  Inferior lingular atelectasis and dependent hypoventilatory changes in the upper and lower lobes  Minimal fluid in diffusely patulous esophagus attributed to gastroesophageal reflux  Otherwise, no evidence of acute thoracic process  CT abdomen and pelvis: No evidence of acute abdominopelvic process  Thickening at the GE junction attributed to recent hiatal hernia repair  Workstation performed: [id number]     Cta Stroke Alert (head/neck)    Result Date: 5/12/2021  Narrative: CTA NECK AND BRAIN WITH CONTRAST INDICATION: aphasia, CVA COMPARISON:   None  TECHNIQUE:   Post contrast imaging was performed after administration of iodinated contrast through the neck and brain  Post contrast axial 0 625 mm images timed to opacify the arterial system  3D rendering was performed on an independent workstation  MIP reconstructions performed  Coronal reconstructions were performed of the noncontrast portion of the brain  Radiation dose length product (DLP) for this visit:  556 54 mGy-cm     This examination, like all CT scans performed in the [facility], was performed utilizing techniques to minimize radiation dose exposure, including the use of iterative  reconstruction and automated exposure control  IV Contrast:  100 mL of iohexol (OMNIPAQUE)  IMAGE QUALITY:   Diagnostic FINDINGS: CERVICAL VASCULATURE AORTIC ARCH AND GREAT VESSELS:  Normal aortic arch and great vessel origins  Normal visualized subclavian vessels  RIGHT VERTEBRAL ARTERY CERVICAL SEGMENT:  Normal origin  The vessel is normal in caliber throughout the neck  LEFT VERTEBRAL ARTERY CERVICAL SEGMENT:  Normal origin  The vessel is normal in caliber throughout the neck  RIGHT EXTRACRANIAL CAROTID SEGMENT:  Normal caliber common carotid artery  Normal bifurcation and cervical internal carotid artery  No stenosis or dissection  LEFT EXTRACRANIAL CAROTID SEGMENT:  Normal caliber common carotid artery  Normal bifurcation and cervical internal carotid artery  No stenosis or dissection  NASCET criteria was used to determine the degree of internal carotid artery diameter stenosis  INTRACRANIAL VASCULATURE INTERNAL CAROTID ARTERIES:  Normal enhancement of the intracranial portions of the internal carotid arteries  Normal ophthalmic artery origins  Normal ICA terminus  ANTERIOR CIRCULATION:  Symmetric A1 segments and anterior cerebral arteries with normal enhancement  Normal anterior communicating artery  MIDDLE CEREBRAL ARTERY CIRCULATION:  M1 segment and middle cerebral artery branches demonstrate normal enhancement bilaterally  DISTAL VERTEBRAL ARTERIES:  Normal distal vertebral arteries  Posterior inferior cerebellar artery origins are normal  Normal vertebral basilar junction  BASILAR ARTERY:  Basilar artery is normal in caliber  Normal superior cerebellar arteries  POSTERIOR CEREBRAL ARTERIES: Both posterior cerebral arteries arises from the basilar tip  Both arteries demonstrate normal enhancement  Normal posterior communicating arteries   DURAL VENOUS SINUSES:  Normal  NON VASCULAR ANATOMY BONY STRUCTURES: No acute osseous abnormality  SOFT TISSUES OF THE NECK:  Soft tissue air is identified within the deep right portion of the neck around the vasculature  This is of indeterminate etiology, correlate clinically  THORACIC INLET:  Consolidation in the left and right upper lobes and lower lobes is partially visualized could relate to multifocal pneumonia and/or atelectasis  Small amount of anterior left pneumomediastinum of indeterminate origin        Impression: No evidence of hemodynamic significant stenosis, aneurysm or dissection  Soft tissue air is identified within the deep right portion of the neck around the vasculature  This is of indeterminate etiology, correlate clinically  Consolidation in the left and right upper lobes and lower lobes is partially visualized could relate to multifocal pneumonia and/or atelectasis  Small amount of anterior left pneumomediastinum of indeterminate origin    Findings were directly discussed with Rafael Landa on 5/12/2021 6:36 PM  Workstation performed: [id number]

## 2021-06-04 ENCOUNTER — PATIENT OUTREACH (OUTPATIENT)
Dept: INTERNAL MEDICINE CLINIC | Facility: CLINIC | Age: 61
End: 2021-06-04

## 2021-06-04 DIAGNOSIS — Z78.9 NEEDS ASSISTANCE WITH COMMUNITY RESOURCES: Primary | ICD-10-CM

## 2021-06-04 NOTE — PROGRESS NOTES
3rd outreach attempt to patient to check status and assess additional needs  [facility] [address] [facility]  Patient and patient's mother live together  Referral received due to food insecurity concerns; previously spoke with [facility] who confirmed patient and patient's mother have adequate food supply in the home at this time  Spoke with patient who stated she is not doing great  Patient had hernia repair on 5/11 and informed General Surgery that she believes her incision is infected  JERAMY RN conducted home visit today and also feels incision is infected  Patient is now taking 5 day course of antibiotics  Patient currently drives herself to appts but due to not feeling well lately would like to consider alternate transport options  Informed patient of Jill Helton; patient agreed for [facility] to mail information on this service to review  Also discussed Kathryn Rowley  Patient is 61years old and has [address] Medicare and [facility]; [facility] informed patient of possible cost for transport tickets  Patient would like Lizyz Lawton to assist with [employer] application process  Patient does not currently use DME to ambulate but has been feeling dizzy at times  Will place referral to Lizzy Lawton for assistance with transportation application  Patient stated she and her mother have food (supplied through [employer])  No food insecurity concerns at this time  Patient manages her own medications  Patient stated she had increased confusion during recent hospitalization and feels she is close to her baseline again but still has moments of confusion; inquired about speech therapy through [facility] services and if this would help with her confusion  Will relay this request to Dr Ludy Iraheta for further advisement/orders pending his input  Patient denied any additional needs at this time  OPCM SW continues to follow

## 2021-06-17 DIAGNOSIS — K22.10 EROSIVE ESOPHAGITIS: ICD-10-CM

## 2021-06-17 RX ORDER — PANTOPRAZOLE SODIUM 40 MG/1
TABLET, DELAYED RELEASE ORAL
Qty: 60 TABLET | Refills: 0 | Status: SHIPPED | OUTPATIENT
Start: 2021-06-17 | End: 2021-07-15

## 2021-06-21 ENCOUNTER — PATIENT OUTREACH (OUTPATIENT)
Dept: INTERNAL MEDICINE CLINIC | Facility: CLINIC | Age: 61
End: 2021-06-21

## 2021-06-21 ENCOUNTER — TELEPHONE (OUTPATIENT)
Dept: GASTROENTEROLOGY | Facility: CLINIC | Age: 61
End: 2021-06-21

## 2021-06-21 ENCOUNTER — PATIENT OUTREACH (OUTPATIENT)
Dept: CASE MANAGEMENT | Facility: OTHER | Age: 61
End: 2021-06-21

## 2021-06-21 NOTE — PROGRESS NOTES
OPCM SW outreached patient to follow-up, check status, and remind that Loc Montalvo will be outreaching to assist with [employer] Application process  No answer, voicemail left, and awaiting return call  Update routed to Loc Montalvo

## 2021-06-22 NOTE — TELEPHONE ENCOUNTER
Spoke to patient on phone  Patient reported that initially after surgery acid reflux was well controlled and she was decreased to pantoprazole 40 mg daily  She recently started having terrible acid reflux  Patient placed back on pantoprazole 40 mg b i d  with control symptoms  Next follow-up with Dr Cadet  9/9/2021 at time of repeat EGD for evaluation of TIF  Will forward note to Dr Cadet  Thank you

## 2021-06-29 DIAGNOSIS — F41.9 ANXIETY: ICD-10-CM

## 2021-06-29 RX ORDER — PAROXETINE 30 MG/1
TABLET, FILM COATED ORAL
Qty: 90 TABLET | Refills: 1 | OUTPATIENT
Start: 2021-06-29

## 2021-06-29 RX ORDER — LORAZEPAM 2 MG/1
2 TABLET ORAL EVERY 6 HOURS PRN
Qty: 120 TABLET | Refills: 1 | Status: SHIPPED | OUTPATIENT
Start: 2021-06-29 | End: 2021-07-13 | Stop reason: SDUPTHER

## 2021-07-06 ENCOUNTER — PATIENT OUTREACH (OUTPATIENT)
Dept: INTERNAL MEDICINE CLINIC | Facility: CLINIC | Age: 61
End: 2021-07-06
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
## 2021-07-13 ENCOUNTER — TELEMEDICINE (OUTPATIENT)
Dept: INTERNAL MEDICINE CLINIC | Facility: CLINIC | Age: 61
End: 2021-07-13
Payer: COMMERCIAL

## 2021-07-13 DIAGNOSIS — K21.00 HIATAL HERNIA WITH GASTROESOPHAGEAL REFLUX DISEASE AND ESOPHAGITIS: ICD-10-CM

## 2021-07-13 DIAGNOSIS — F41.9 ANXIETY: Primary | ICD-10-CM

## 2021-07-13 DIAGNOSIS — E78.2 MIXED HYPERLIPIDEMIA: ICD-10-CM

## 2021-07-13 DIAGNOSIS — K44.9 HIATAL HERNIA WITH GASTROESOPHAGEAL REFLUX DISEASE AND ESOPHAGITIS: ICD-10-CM

## 2021-07-13 DIAGNOSIS — K44.9 HIATAL HERNIA WITH GERD AND ESOPHAGITIS: ICD-10-CM

## 2021-07-13 DIAGNOSIS — K21.00 HIATAL HERNIA WITH GERD AND ESOPHAGITIS: ICD-10-CM

## 2021-07-13 DIAGNOSIS — E03.9 ACQUIRED HYPOTHYROIDISM: ICD-10-CM

## 2021-07-13 PROCEDURE — 99213 OFFICE O/P EST LOW 20 MIN: CPT | Performed by: INTERNAL MEDICINE

## 2021-07-13 RX ORDER — LORAZEPAM 2 MG/1
2 TABLET ORAL EVERY 6 HOURS PRN
Qty: 120 TABLET | Refills: 1 | Status: SHIPPED | OUTPATIENT
Start: 2021-07-13 | End: 2021-09-03 | Stop reason: SDUPTHER

## 2021-07-13 RX ORDER — ONDANSETRON 4 MG/1
4 TABLET, ORALLY DISINTEGRATING ORAL EVERY 6 HOURS PRN
Qty: 30 TABLET | Refills: 0 | Status: SHIPPED | OUTPATIENT
Start: 2021-07-13 | End: 2021-08-13 | Stop reason: SDUPTHER

## 2021-07-13 NOTE — PROGRESS NOTES
Virtual Regular Visit    Verification of patient location:    Patient is currently located in the state [facility]  Patient is currently located in a state in which I am licensed    Assessment/Plan:    Problem List Items Addressed This Visit        Digestive    Hiatal hernia with gastroesophageal reflux disease and esophagitis      Continue pantoprazole twice a day as per GI            Endocrine    Acquired hypothyroidism      Continue levothyroxine along with monitoring            Other    Anxiety - Primary      Continue medications, follow-up Psychiatry, refill lorazepam         Relevant Medications    LORazepam (ATIVAN) 2 mg tablet    Mixed hyperlipidemia      Continue statin along with healthy diet and exercise                    Reason for visit is   Chief Complaint   Patient presents with    Virtual Regular Visit        Encounter provider Yamilka Martinez MD    Provider located at [address] [place] [id number]      Recent Visits  No visits were found meeting these conditions  Showing recent visits within past 7 days and meeting all other requirements  Today's Visits  Date Type Provider Dept   07/13/21 Telemedicine Tye Parsons today's visits and meeting all other requirements  Future Appointments  No visits were found meeting these conditions  Showing future appointments within next 150 days and meeting all other requirements       The patient was identified by name and date of birth  Carissa Brocathy was informed that this is a telemedicine visit and that the visit is being conducted through [facility] and patient was informed that this is a secure, HIPAA-compliant platform  She agrees to proceed     My office door was closed  No one else was in the room  She acknowledged consent and understanding of privacy and security of the video platform   The patient has agreed to participate and understands they can discontinue the visit at any time  Patient is aware this is a billable service  Subjective  Adarsh Pruitt is a 61 y o  female    Anxiety: pt reports she only received on bottle of Ativan where she should have received 2 bottles  She uses 2 mg every 6 hours as needed       Past Medical History:   Diagnosis Date    Anxiety     Arthritis     Back pain at L4-L5 level     Schreiber esophagus     Bulimia     Colon polyp     Disease of thyroid gland     hypothyroid    Ear problems     GERD (gastroesophageal reflux disease)     History of transfusion     Hyperlipidemia     Hypothyroidism     Leukopenia     Nasal congestion     NMS (neuroleptic malignant syndrome) 01/03/2020    Pneumonia     Rheumatoid arthritis involving right hip (HCC)     Sciatica     Seizure ([facility] [address] [facility] )     due to medication mix up 8/2018 only one historically    Seizures ([facility] [address] [facility] )     Synovial cyst of lumbar spine     Vertigo     Weight gain        Past Surgical History:   Procedure Laterality Date    BONE MARROW BIOPSY      BREAST SURGERY      enlargement     CLOSED REDUCTION DISTAL FEMUR FRACTURE      COLONOSCOPY      COSMETIC SURGERY      HIP ARTHROPLASTY Right 1/2/2020    Procedure: REVISION TOTAL HIP ARTHROPLASTY WITH REPAIR OF PARIPROSTHETIC FRACTURE - POSTERIOR APPROACH;  Surgeon: Jerrod Houser MD;  [facility];  Service: Orthopedics    NV ESOPHAGOGASTRODUODENOSCOPY TRANSORAL DIAGNOSTIC N/A 5/11/2021    Procedure: ESOPHAGOGASTRODUODENOSCOPY (EGD); Surgeon: Nathanael Camarena MD;  [facility];  Service: General    NV ESOPHAGUS SURG PROCEDURE UNLISTED N/A 5/11/2021    Procedure: FUNDOPLICATION TRANSORAL INCISIONLESS;  Surgeon: Bhavana Josue MD;  [facility];  Service: Gastroenterology    NV LAP, REPAIR PARAESOPHAGEAL HERNIA, INCL FUNDOPLASTY W/ MESH N/A 5/11/2021    Procedure: REPAIR HERNIA PARAESOPHAGEAL  LAPAROSCOPIC;  Surgeon:  Nathanael Camarena MD;  [facility];  Service: General    UT TOTAL HIP ARTHROPLASTY Right 12/11/2019    Procedure: ARTHROPLASTY HIP TOTAL ANTERIOR;  Surgeon: Carmela Chung MD;  [facility];  Service: Orthopedics    RHINOPLASTY      SINUS SURGERY      TRANSORAL INCISIONLESS FUNDOPLICATION (TIF)  09/00/7673       Current Outpatient Medications   Medication Sig Dispense Refill    aspirin (ECOTRIN LOW STRENGTH) 81 mg EC tablet Take 1 tablet (81 mg total) by mouth daily 30 tablet 0    atorvastatin (LIPITOR) 40 mg tablet Take 1 tablet (40 mg total) by mouth every evening 30 tablet 0    ferrous sulfate 325 (65 Fe) mg tablet Take 1 tablet (325 mg total) by mouth 2 (two) times a day with meals 60 tablet 1    levothyroxine 25 mcg tablet Take 1 tablet (25 mcg total) by mouth daily 90 tablet 3    LORazepam (ATIVAN) 2 mg tablet Take 1 tablet (2 mg total) by mouth every 6 (six) hours as needed for anxiety 120 tablet 1    meclizine (ANTIVERT) 25 mg tablet Take 1 tablet (25 mg total) by mouth 3 (three) times a day as needed for dizziness 30 tablet 0    Multiple Vitamin (MULTIVITAMIN) capsule Take 1 capsule by mouth daily 30 capsule 0    ondansetron (ZOFRAN-ODT) 4 mg disintegrating tablet Take 1 tablet (4 mg total) by mouth every 6 (six) hours as needed for nausea or vomiting 30 tablet 0    PARoxetine (PAXIL) 30 mg tablet Take 2 tablets (60 mg total) by mouth daily 180 tablet 3    QUEtiapine (SEROquel) 400 MG tablet Take 1 tablet (400 mg total) by mouth daily at bedtime 90 tablet 3    ziprasidone (GEODON) 80 mg capsule Take 1 capsule (80 mg total) by mouth daily 90 capsule 1    acetaminophen (TYLENOL) 325 mg tablet Take 3 tablets (975 mg total) by mouth every 8 (eight) hours (Patient not taking: Reported on 6/3/2021) 30 tablet 0    pantoprazole (PROTONIX) 40 mg tablet TAKE 1 TABLET BY MOUTH TWICE A DAY 60 tablet 0    triamcinolone (KENALOG) 0 1 % cream Apply 1 application topically 2 (two) times a day To affected area (Patient not taking: Reported on 6/1/2021) 80 g 5     No current facility-administered medications for this visit  Allergies   Allergen Reactions    Risperdal [Risperidone] Shortness Of Breath     Rapid heart beat, SOB    Zyprexa [Olanzapine] Shortness Of Breath     Rapid heartbeat    Latex Rash     Band aids, adhesives wears normal underwear, can eat bananas  USE PAPER TAPE       Review of Systems   Constitutional: Negative for chills, fatigue and fever  HENT: Negative for congestion, nosebleeds, postnasal drip, sore throat and trouble swallowing  Eyes: Negative for pain  Respiratory: Negative for cough, chest tightness, shortness of breath and wheezing  Cardiovascular: Negative for chest pain, palpitations and leg swelling  Gastrointestinal: Negative for abdominal pain, constipation, diarrhea, nausea and vomiting  Endocrine: Negative for polydipsia and polyuria  Genitourinary: Negative for dysuria, flank pain and hematuria  Musculoskeletal: Negative for arthralgias  Skin: Negative for rash  Neurological: Negative for dizziness, tremors and headaches  Hematological: Does not bruise/bleed easily  Psychiatric/Behavioral: Negative for confusion and dysphoric mood  The patient is nervous/anxious  Video Exam    There were no vitals filed for this visit  Physical Exam   It was my intent to perform this visit via video technology but the patient was not able to do a video connection so the visit was completed via audio telephone only  I spent 20 minutes with patient today in which greater than 50% of the time was spent in counseling/coordination of care regarding acute and chronic conditions    VIRTUAL VISIT DISCLAIMER      Madeline Plascencia verbally agrees to participate in [facility]   Pt is aware that [facility] could be limited without vital signs or the ability to perform a full hands-on physical exam  Irene Plascencia understands she or the provider may request at any time to terminate the video visit and request the patient to seek care or treatment in person

## 2021-07-13 NOTE — PATIENT INSTRUCTIONS
Problem List Items Addressed This Visit        Digestive    Hiatal hernia with gastroesophageal reflux disease and esophagitis      Continue pantoprazole twice a day as per GI            Endocrine    Acquired hypothyroidism      Continue levothyroxine along with monitoring            Other    Anxiety - Primary      Continue medications, follow-up Psychiatry, refill lorazepam         Relevant Medications    LORazepam (ATIVAN) 2 mg tablet    Mixed hyperlipidemia      Continue statin along with healthy diet and exercise

## 2021-07-15 ENCOUNTER — TELEPHONE (OUTPATIENT)
Dept: INTERNAL MEDICINE CLINIC | Facility: CLINIC | Age: 61
End: 2021-07-15

## 2021-07-15 DIAGNOSIS — K22.10 EROSIVE ESOPHAGITIS: ICD-10-CM

## 2021-07-15 RX ORDER — PANTOPRAZOLE SODIUM 40 MG/1
TABLET, DELAYED RELEASE ORAL
Qty: 60 TABLET | Refills: 0 | Status: SHIPPED | OUTPATIENT
Start: 2021-07-15 | End: 2021-08-17

## 2021-07-15 NOTE — TELEPHONE ENCOUNTER
Pharmacy reports they counted out the pills for 120, and actually gave her 122 pills in one bottle  In the past pt may have had one bottle with 100 pills and another bottle with 20 pills    The refill may not be covered by insurance

## 2021-07-15 NOTE — TELEPHONE ENCOUNTER
Patient called in said her pharmacy refuses to fill the Ativan you sent on 7/13  They told her you would need to call to confirm the release of this medication for her ([phone])    She said she has not taken this in two days and feels like she is going to die    Please advise

## 2021-07-21 ENCOUNTER — PATIENT OUTREACH (OUTPATIENT)
Dept: INTERNAL MEDICINE CLINIC | Facility: CLINIC | Age: 61
End: 2021-07-21

## 2021-07-21 NOTE — PROGRESS NOTES
Call placed to [place] [employer]  Phil Penaloza to check status of patient and patient's mother's case  No answer, voicemail left, and awaiting return call  Call placed to patient to check status  Patient stated she is doing well, is feeling much better, and is able to do the cooking and grocery shopping again; is appreciative for Dr Kellie Parra helping her  Patient declined [employer] transport services as she has transportation benefits through [facility] [employer] that she can utilize  Patient has not heard from [employer]  Phil Penaloza  Discussed patient's mom's request for SNF placement for short-term rehab and that hospital stay would be needed due to having Medicare (last hospitalization was in March; only other recent hospital visits were to the ED but patient was not admitted)  Patient understanding, will discuss options with her mom, and outreach OPCM SW as needed  OPCM SW to close patient's case at this time but will remain available to assist with any future needs  Update routed to Dr Kellie Parra

## 2021-07-26 ENCOUNTER — PATIENT OUTREACH (OUTPATIENT)
Dept: INTERNAL MEDICINE CLINIC | Facility: CLINIC | Age: 61
End: 2021-07-26

## 2021-07-26 ENCOUNTER — OFFICE VISIT (OUTPATIENT)
Dept: INTERNAL MEDICINE CLINIC | Facility: CLINIC | Age: 61
End: 2021-07-26
Payer: COMMERCIAL

## 2021-07-26 VITALS
HEART RATE: 105 BPM | BODY MASS INDEX: 31.18 KG/M2 | HEIGHT: 63 IN | OXYGEN SATURATION: 95 % | SYSTOLIC BLOOD PRESSURE: 156 MMHG | TEMPERATURE: 97.6 F | WEIGHT: 176 LBS | DIASTOLIC BLOOD PRESSURE: 98 MMHG

## 2021-07-26 DIAGNOSIS — Z59.9 PROBLEM RELATED TO LIVING ARRANGEMENT: ICD-10-CM

## 2021-07-26 DIAGNOSIS — E03.9 ACQUIRED HYPOTHYROIDISM: ICD-10-CM

## 2021-07-26 DIAGNOSIS — F41.9 ANXIETY: Primary | ICD-10-CM

## 2021-07-26 DIAGNOSIS — E78.2 MIXED HYPERLIPIDEMIA: ICD-10-CM

## 2021-07-26 PROCEDURE — 1036F TOBACCO NON-USER: CPT | Performed by: INTERNAL MEDICINE

## 2021-07-26 PROCEDURE — 3008F BODY MASS INDEX DOCD: CPT | Performed by: INTERNAL MEDICINE

## 2021-07-26 PROCEDURE — 3725F SCREEN DEPRESSION PERFORMED: CPT | Performed by: INTERNAL MEDICINE

## 2021-07-26 PROCEDURE — 99214 OFFICE O/P EST MOD 30 MIN: CPT | Performed by: INTERNAL MEDICINE

## 2021-07-26 SDOH — ECONOMIC STABILITY - INCOME SECURITY: PROBLEM RELATED TO HOUSING AND ECONOMIC CIRCUMSTANCES, UNSPECIFIED: Z59.9
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
## 2021-07-26 NOTE — PATIENT INSTRUCTIONS
Problem List Items Addressed This Visit        Endocrine    Acquired hypothyroidism      Continue levothyroxine along with monitoring            Other    Anxiety - Primary     Follow up psychiatry, continue meds  Pt has increased stressors with her mother  Problem related to living arrangement     Continue with Social Support to help with living situation  Pt and mother have been trying to get mother into assisted living    Pt does not know how to get her mother to agree to go to hospital          Mixed hyperlipidemia     Continue statin with healthy diet

## 2021-07-26 NOTE — ASSESSMENT & PLAN NOTE
Continue with Social Support to help with living situation  Pt and mother have been trying to get mother into assisted living    Pt does not know how to get her mother to agree to go to hospital
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
## 2021-08-13 DIAGNOSIS — K44.9 HIATAL HERNIA WITH GERD AND ESOPHAGITIS: ICD-10-CM

## 2021-08-13 DIAGNOSIS — K21.00 HIATAL HERNIA WITH GERD AND ESOPHAGITIS: ICD-10-CM

## 2021-08-13 RX ORDER — ONDANSETRON 4 MG/1
4 TABLET, ORALLY DISINTEGRATING ORAL EVERY 6 HOURS PRN
Qty: 30 TABLET | Refills: 0 | Status: SHIPPED | OUTPATIENT
Start: 2021-08-13 | End: 2021-09-07 | Stop reason: SDUPTHER

## 2021-08-17 DIAGNOSIS — K22.10 EROSIVE ESOPHAGITIS: ICD-10-CM

## 2021-08-17 RX ORDER — PANTOPRAZOLE SODIUM 40 MG/1
TABLET, DELAYED RELEASE ORAL
Qty: 60 TABLET | Refills: 0 | Status: SHIPPED | OUTPATIENT
Start: 2021-08-17 | End: 2021-09-13

## 2021-08-24 DIAGNOSIS — L23.9 DERMAL HYPERSENSITIVITY REACTION: ICD-10-CM

## 2021-08-24 RX ORDER — TRIAMCINOLONE ACETONIDE 1 MG/G
1 CREAM TOPICAL 2 TIMES DAILY
Qty: 80 G | Refills: 5 | Status: SHIPPED | OUTPATIENT
Start: 2021-08-24 | End: 2021-11-16 | Stop reason: SDUPTHER

## 2021-08-27 ENCOUNTER — OFFICE VISIT (OUTPATIENT)
Dept: SURGERY | Facility: CLINIC | Age: 61
End: 2021-08-27
Payer: COMMERCIAL

## 2021-08-27 VITALS — TEMPERATURE: 97.8 F | BODY MASS INDEX: 32.82 KG/M2 | HEIGHT: 63 IN | WEIGHT: 185.2 LBS

## 2021-08-27 DIAGNOSIS — K21.00 GASTROESOPHAGEAL REFLUX DISEASE WITH ESOPHAGITIS WITHOUT HEMORRHAGE: Primary | Chronic | ICD-10-CM

## 2021-08-27 PROCEDURE — 99213 OFFICE O/P EST LOW 20 MIN: CPT | Performed by: SURGERY

## 2021-08-27 NOTE — PROGRESS NOTES
Office Visit - General Surgery  Choco Monae MRN: [id number]  Encounter: [id number]    Assessment and Plan    Problem List Items Addressed This Visit        Digestive    Gastroesophageal reflux disease with esophagitis without hemorrhage - Primary (Chronic)     70-year-old female presents to clinic today status post laparoscopic paraesophageal hernia repair with intraoperative TIF, on 05/11/2021, here for follow-up  Plan:   She is complaining of a moderate amount of nausea, as well as some occasional regurgitation worse in the morning  She seems to be under an inordinate amount of stress, is extremely anxious regarding her home situation  She should continue on her Protonix 40 b i d  for now,  And she is due for an endoscopy with Dr Melody Alejandra  In less than 1 month  I will be interested to see if that yield any important diagnostic information  We reviewed quality eating habits after fundoplication, including 6 small meals  She can return to clinic in 3 months for repeat check  Chief Complaint:  Choco Monae is a 61 y o  female who presents for Post-op (3 month f/u paraesophageal hernia repair )    Subjective   70-year-old female status post laparoscopic paraesophageal hernia repair and TIF in May of 2021 presents for follow-up today  She is complaining of intermittent nausea worse in the morning with occasional small amount of regurgitation of bilious material   She does state that occasionally she will have in epic mode today are associated with living with her mother, and her daily anxiety  She does occasionally take Zofran as needed for her nausea, which seems to help  She continues to remain on Protonix b i d  She denies any abdominal pain, fevers, chills, chest pain, or shortness of breath  In the office today she took the GERD HRQL questionnaire and scored 14 with overall dissatisfaction with her current condition      Past Medical History  Past Medical History: Diagnosis Date    Anxiety     Arthritis     Back pain at L4-L5 level     Schreiber esophagus     Bulimia     Colon polyp     Disease of thyroid gland     hypothyroid    Ear problems     GERD (gastroesophageal reflux disease)     History of transfusion     Hyperlipidemia     Hypothyroidism     Leukopenia     Nasal congestion     NMS (neuroleptic malignant syndrome) 01/03/2020    Pneumonia     Rheumatoid arthritis involving right hip (HCC)     Sciatica     Seizure ([facility] [address] )     due to medication mix up 8/2018 only one historically    Seizures ([facility] [address] )     Synovial cyst of lumbar spine     Vertigo     Weight gain        Past Surgical History  Past Surgical History:   Procedure Laterality Date    BONE MARROW BIOPSY      BREAST SURGERY      enlargement     CLOSED REDUCTION DISTAL FEMUR FRACTURE      COLONOSCOPY      COSMETIC SURGERY      HIP ARTHROPLASTY Right 1/2/2020    Procedure: REVISION TOTAL HIP ARTHROPLASTY WITH REPAIR OF PARIPROSTHETIC FRACTURE - POSTERIOR APPROACH;  Surgeon: Tamara Hull MD;  [facility];  Service: Orthopedics    CO ESOPHAGOGASTRODUODENOSCOPY TRANSORAL DIAGNOSTIC N/A 5/11/2021    Procedure: ESOPHAGOGASTRODUODENOSCOPY (EGD); Surgeon: Gutierrez Keen MD;  [facility];  Service: General    CO ESOPHAGUS SURG PROCEDURE UNLISTED N/A 5/11/2021    Procedure: FUNDOPLICATION TRANSORAL INCISIONLESS;  Surgeon: Shahid Anderson MD;  [facility];  Service: Gastroenterology    CO LAP, REPAIR PARAESOPHAGEAL HERNIA, INCL FUNDOPLASTY W/ MESH N/A 5/11/2021    Procedure: REPAIR HERNIA PARAESOPHAGEAL  LAPAROSCOPIC;  Surgeon:  Gutierrez Keen MD;  [facility];  Service: General    CO TOTAL HIP ARTHROPLASTY Right 12/11/2019    Procedure: ARTHROPLASTY HIP TOTAL ANTERIOR;  Surgeon: Tamara Hull MD;  [facility];  Service: Orthopedics    RHINOPLASTY      SINUS SURGERY      TRANSORAL INCISIONLESS FUNDOPLICATION (TIF)  91/24/0298       Family History  Family History   Problem Relation Age of Onset    Uterine cancer Mother     Esophageal cancer Father     Colon cancer Maternal Grandmother        Social History  Social History     Socioeconomic History    Marital status: Single     Spouse name: None    Number of children: None    Years of education: None    Highest education level: None   Occupational History    None   Tobacco Use    Smoking status: Never Smoker    Smokeless tobacco: Never Used   Vaping Use    Vaping Use: Never used   Substance and Sexual Activity    Alcohol use: Not Currently    Drug use: Not Currently    Sexual activity: Not Currently   Other Topics Concern    None   Social History Narrative    Family disruption death of family member parent, per allscripts     Social Determinants of Health     Financial Resource Strain:     Difficulty of Paying Living Expenses:    Food Insecurity:     Worried About Running Out of Food in the Last Year:     Ran Out of Food in the Last Year:    Transportation Needs:     Lack of Transportation (Medical):  Lack of Transportation (Non-Medical):    Physical Activity:     Days of Exercise per Week:     Minutes of Exercise per Session:    Stress: Stress Concern Present    Feeling of Stress :  To some extent   Social Connections:     Frequency of Communication with Friends and Family:     Frequency of Social Gatherings with Friends and Family:     Attends [employer] Services:     Active Member of Clubs or Organizations:     Attends Club or Organization Meetings:     Marital Status:    Intimate Partner Violence:     Fear of Current or Ex-Partner:     Emotionally Abused:     Physically Abused:     Sexually Abused:         Medications  Current Outpatient Medications on File Prior to Visit   Medication Sig Dispense Refill    aspirin (ECOTRIN LOW STRENGTH) 81 mg EC tablet Take 1 tablet (81 mg total) by mouth daily 30 tablet 0    atorvastatin (LIPITOR) 40 mg tablet Take 1 tablet (40 mg total) by mouth every evening 30 tablet 0    Famotidine (PEPCID PO) Take by mouth 2 tablets daily      ferrous sulfate 325 (65 Fe) mg tablet Take 1 tablet (325 mg total) by mouth 2 (two) times a day with meals 60 tablet 1    levothyroxine 25 mcg tablet Take 1 tablet (25 mcg total) by mouth daily 90 tablet 3    LORazepam (ATIVAN) 2 mg tablet Take 1 tablet (2 mg total) by mouth every 6 (six) hours as needed for anxiety 120 tablet 1    meclizine (ANTIVERT) 25 mg tablet Take 1 tablet (25 mg total) by mouth 3 (three) times a day as needed for dizziness 30 tablet 0    Multiple Vitamin (MULTIVITAMIN) capsule Take 1 capsule by mouth daily 30 capsule 0    ondansetron (ZOFRAN-ODT) 4 mg disintegrating tablet Take 1 tablet (4 mg total) by mouth every 6 (six) hours as needed for nausea or vomiting 30 tablet 0    pantoprazole (PROTONIX) 40 mg tablet TAKE 1 TABLET BY MOUTH TWICE A DAY 60 tablet 0    PARoxetine (PAXIL) 30 mg tablet Take 2 tablets (60 mg total) by mouth daily 180 tablet 3    QUEtiapine (SEROquel) 400 MG tablet Take 1 tablet (400 mg total) by mouth daily at bedtime 90 tablet 3    triamcinolone (KENALOG) 0 1 % cream Apply 1 application topically 2 (two) times a day To affected area 80 g 5    ziprasidone (GEODON) 80 mg capsule Take 1 capsule (80 mg total) by mouth daily 90 capsule 1    acetaminophen (TYLENOL) 325 mg tablet Take 3 tablets (975 mg total) by mouth every 8 (eight) hours (Patient not taking: Reported on 6/3/2021) 30 tablet 0    [DISCONTINUED] pantoprazole (PROTONIX) 40 mg tablet Take 1 tablet (40 mg total) by mouth 2 (two) times a day 60 tablet 0     No current facility-administered medications on file prior to visit         Allergies  Allergies   Allergen Reactions    Risperdal [Risperidone] Shortness Of Breath     Rapid heart beat, SOB    Zyprexa [Olanzapine] Shortness Of Breath     Rapid heartbeat    Latex Rash     Band aids, adhesives wears normal underwear, can eat bananas  USE PAPER TAPE       Review of Systems   Constitutional: Negative for chills, fatigue and fever  HENT: Negative for ear pain, facial swelling, sinus pressure and sinus pain  Eyes: Negative for pain  Respiratory: Negative for cough, shortness of breath and wheezing  Cardiovascular: Negative for chest pain  Gastrointestinal: Positive for nausea and vomiting  Negative for abdominal pain, constipation and diarrhea  Endocrine: Negative for cold intolerance and heat intolerance  Genitourinary: Negative for dysuria and flank pain  Musculoskeletal: Negative for back pain and neck pain  Skin: Negative for wound  Neurological: Negative for syncope, facial asymmetry, light-headedness and numbness  Psychiatric/Behavioral: Positive for agitation  Negative for behavioral problems, confusion and suicidal ideas  The patient is nervous/anxious  Objective  Vitals:    08/27/21 0952   Temp: 97 8 °F (36 6 °C)       Physical Exam  Vitals and nursing note reviewed  Constitutional:       General: She is not in acute distress  Appearance: Normal appearance  She is not toxic-appearing  HENT:      Head: Normocephalic and atraumatic  Mouth/Throat:      Mouth: Mucous membranes are moist    Eyes:      Extraocular Movements: Extraocular movements intact  Pupils: Pupils are equal, round, and reactive to light  Cardiovascular:      Rate and Rhythm: Normal rate and regular rhythm  Pulses: Normal pulses  Pulmonary:      Effort: Pulmonary effort is normal  No respiratory distress  Breath sounds: Normal breath sounds  No wheezing  Abdominal:      General: There is no distension  Palpations: Abdomen is soft  There is no mass  Tenderness: There is no abdominal tenderness  There is no guarding or rebound  Hernia: No hernia is present  Comments: Well-healed laparoscopic port sites  Musculoskeletal:         General: No swelling or deformity  Normal range of motion        Cervical back: Normal range of motion and neck supple  Right lower leg: No edema  Left lower leg: No edema  Skin:     General: Skin is warm and dry  Coloration: Skin is not jaundiced  Neurological:      General: No focal deficit present  Mental Status: She is alert and oriented to person, place, and time     Psychiatric:         Mood and Affect: Mood normal          Behavior: Behavior normal

## 2021-08-27 NOTE — ASSESSMENT & PLAN NOTE
28-year-old female presents to clinic today status post laparoscopic paraesophageal hernia repair with intraoperative TIF, on 05/11/2021, here for follow-up  Plan:   She is complaining of a moderate amount of nausea, as well as some occasional regurgitation worse in the morning  She seems to be under an inordinate amount of stress, is extremely anxious regarding her home situation  She should continue on her Protonix 40 b i d  for now,  And she is due for an endoscopy with Dr Dominic Mariscal  In less than 1 month  I will be interested to see if that yield any important diagnostic information  We reviewed quality eating habits after fundoplication, including 6 small meals  She can return to clinic in 3 months for repeat check

## 2021-08-30 ENCOUNTER — TELEPHONE (OUTPATIENT)
Dept: INTERNAL MEDICINE CLINIC | Facility: CLINIC | Age: 61
End: 2021-08-30

## 2021-08-30 NOTE — TELEPHONE ENCOUNTER
Patient called back offered her 9:45 am but she said she had another appointment and she said she wants to come in for this appointment  So what time tomorrow after noon may I put her on your schedule to have an in office visit with you?

## 2021-08-30 NOTE — TELEPHONE ENCOUNTER
Sindy Wall called to see if you can squeeze her in for an appt this afternoon to discuss her mother going to a nursing home

## 2021-08-30 NOTE — TELEPHONE ENCOUNTER
Spoke with Skylar Sheppard, she said she wants to discuss this with you, states "it's time a doctor needs to step in"

## 2021-08-30 NOTE — TELEPHONE ENCOUNTER
Okay, I guess at her to the schedule as a virtual visit if she wants to do it virtually let staff she is also to be wellness visit Medicare if she is doing this virtually make sure staff clicks the "VIRTUAL AWV" button in the smart sets      Then they can ask her the Medicare questions and click the spots in the template for this

## 2021-08-30 NOTE — TELEPHONE ENCOUNTER
Ask if pt would rather discuss with me or Venkatesh Ann since she has been doing the coordinating of this

## 2021-08-31 ENCOUNTER — OFFICE VISIT (OUTPATIENT)
Dept: INTERNAL MEDICINE CLINIC | Facility: CLINIC | Age: 61
End: 2021-08-31
Payer: COMMERCIAL

## 2021-08-31 VITALS
DIASTOLIC BLOOD PRESSURE: 90 MMHG | WEIGHT: 187 LBS | HEART RATE: 103 BPM | OXYGEN SATURATION: 97 % | SYSTOLIC BLOOD PRESSURE: 164 MMHG | HEIGHT: 63 IN | TEMPERATURE: 97 F | BODY MASS INDEX: 33.13 KG/M2

## 2021-08-31 DIAGNOSIS — Z11.4 SCREENING FOR HIV (HUMAN IMMUNODEFICIENCY VIRUS): ICD-10-CM

## 2021-08-31 DIAGNOSIS — Z12.31 SCREENING MAMMOGRAM, ENCOUNTER FOR: ICD-10-CM

## 2021-08-31 DIAGNOSIS — F41.9 ANXIETY: ICD-10-CM

## 2021-08-31 DIAGNOSIS — Z12.4 SCREENING FOR CERVICAL CANCER: ICD-10-CM

## 2021-08-31 DIAGNOSIS — Z00.00 MEDICARE ANNUAL WELLNESS VISIT, SUBSEQUENT: Primary | ICD-10-CM

## 2021-08-31 PROCEDURE — 1036F TOBACCO NON-USER: CPT | Performed by: INTERNAL MEDICINE

## 2021-08-31 PROCEDURE — G0439 PPPS, SUBSEQ VISIT: HCPCS | Performed by: INTERNAL MEDICINE

## 2021-08-31 PROCEDURE — 3725F SCREEN DEPRESSION PERFORMED: CPT | Performed by: INTERNAL MEDICINE

## 2021-08-31 PROCEDURE — 3008F BODY MASS INDEX DOCD: CPT | Performed by: INTERNAL MEDICINE

## 2021-08-31 NOTE — PATIENT INSTRUCTIONS
Problem List Items Addressed This Visit        Other    Anxiety     Follow up psychiatry  Depression Screening Follow-up Plan: Patient's depression screening was positive with a PHQ-2 score of 6  Their PHQ-9 score was 15  Patient assessed for underlying major depression  They have no active suicidal ideations  Brief counseling provided and recommend additional follow-up/re-evaluation next office visit  Continue with  to help with home situation with mother as this is a big stressor for pt  Medicare annual wellness visit, subsequent - Primary      Discussed preventative health, cancer screening, immunizations, and safety issues  I recommend getting the Shingrix shot to help prevent Shingles  You can get it a pharmacy, and they can administer it there  It is a two shot series with the second shot needed between 2-6 months after the first shot  I would not recommend getting the shot before an important or fun event in case you were to have a reaction to the shot like a sore arm or flu-like symptoms  I make the same recommendation about any shot, as people can have a reaction to any shot  Other Visit Diagnoses     Screening for HIV (human immunodeficiency virus)        Screening for cervical cancer        Screening mammogram, encounter for        Relevant Orders    Mammo screening bilateral w cad          Medicare Preventive Visit Patient Instructions  Thank you for completing your Welcome to Medicare Visit or Medicare Annual Wellness Visit today  Your next wellness visit will be due in one year (9/1/2022)  The screening/preventive services that you may require over the next 5-10 years are detailed below  Some tests may not apply to you based off risk factors and/or age  Screening tests ordered at today's visit but not completed yet may show as past due  Also, please note that scanned in results may not display below    Preventive Screenings:  Service Recommendations Previous Testing/Comments   Colorectal Cancer Screening  * Colonoscopy    * Fecal Occult Blood Test (FOBT)/Fecal Immunochemical Test (FIT)  * Fecal DNA/Cologuard Test  * Flexible Sigmoidoscopy Age: 54-65 years old   Colonoscopy: every 10 years (may be performed more frequently if at higher risk)  OR  FOBT/FIT: every 1 year  OR  Cologuard: every 3 years  OR  Sigmoidoscopy: every 5 years  Screening may be recommended earlier than age 48 if at higher risk for colorectal cancer  Also, an individualized decision between you and your healthcare provider will decide whether screening between the ages of 74-80 would be appropriate  Colonoscopy: 09/24/2020  FOBT/FIT: 09/23/2020  Cologuard: Not on file  Sigmoidoscopy: Not on file    Screening Current     Breast Cancer Screening Age: 36 years old  Frequency: every 1-2 years  Not required if history of left and right mastectomy Mammogram: Not on file        Cervical Cancer Screening Between the ages of 21-29, pap smear recommended once every 3 years  Between the ages of 33-67, can perform pap smear with HPV co-testing every 5 years  Recommendations may differ for women with a history of total hysterectomy, cervical cancer, or abnormal pap smears in past  Pap Smear: Not on file        Hepatitis C Screening Once for adults born between 1945 and 1965  More frequently in patients at high risk for Hepatitis C Hep C Antibody: 07/15/2019    Screening Current   Diabetes Screening 1-2 times per year if you're at risk for diabetes or have pre-diabetes Fasting glucose: 91 mg/dL   A1C: 5 0 %    Screening Current   Cholesterol Screening Once every 5 years if you don't have a lipid disorder  May order more often based on risk factors  Lipid panel: 05/13/2021    Screening Not Indicated  History Lipid Disorder     Other Preventive Screenings Covered by Medicare:  1  Abdominal Aortic Aneurysm (AAA) Screening: covered once if your at risk   You're considered to be at risk if you have a family history of AAA  2  Lung Cancer Screening: covers low dose CT scan once per year if you meet all of the following conditions: (1) Age 50-69; (2) No signs or symptoms of lung cancer; (3) Current smoker or have quit smoking within the last 15 years; (4) You have a tobacco smoking history of at least 30 pack years (packs per day multiplied by number of years you smoked); (5) You get a written order from a healthcare provider  3  Glaucoma Screening: covered annually if you're considered high risk: (1) You have diabetes OR (2) Family history of glaucoma OR (3)  aged 48 and older OR (3)  American aged 72 and older  3  Osteoporosis Screening: covered every 2 years if you meet one of the following conditions: (1) You're estrogen deficient and at risk for osteoporosis based off medical history and other findings; (2) Have a vertebral abnormality; (3) On glucocorticoid therapy for more than 3 months; (4) Have primary hyperparathyroidism; (5) On osteoporosis medications and need to assess response to drug therapy  · Last bone density test (DXA Scan): 06/08/2018  5  HIV Screening: covered annually if you're between the age of 12-76  Also covered annually if you are younger than 13 and older than 72 with risk factors for HIV infection  For pregnant patients, it is covered up to 3 times per pregnancy  Immunizations:  Immunization Recommendations   Influenza Vaccine Annual influenza vaccination during flu season is recommended for all persons aged >= 6 months who do not have contraindications   Pneumococcal Vaccine (Prevnar and Pneumovax)  * Prevnar = PCV13  * Pneumovax = PPSV23   Adults 25-60 years old: 1-3 doses may be recommended based on certain risk factors  Adults 72 years old: Prevnar (PCV13) vaccine recommended followed by Pneumovax (PPSV23) vaccine  If already received PPSV23 since turning 65, then PCV13 recommended at least one year after PPSV23 dose     Hepatitis B Vaccine 3 dose series if at intermediate or high risk (ex: diabetes, end stage renal disease, liver disease)   Tetanus (Td) Vaccine - COST NOT COVERED BY MEDICARE PART B Following completion of primary series, a booster dose should be given every 10 years to maintain immunity against tetanus  Td may also be given as tetanus wound prophylaxis  Tdap Vaccine - COST NOT COVERED BY MEDICARE PART B Recommended at least once for all adults  For pregnant patients, recommended with each pregnancy  Shingles Vaccine (Shingrix) - COST NOT COVERED BY MEDICARE PART B  2 shot series recommended in those aged 48 and above     Health Maintenance Due:      Topic Date Due    HIV Screening  Never done    Cervical Cancer Screening  Never done    Breast Cancer Screening: Mammogram  Never done    Colorectal Cancer Screening  09/24/2025    Hepatitis C Screening  Completed     Immunizations Due:      Topic Date Due    Influenza Vaccine (1) 09/01/2021     Advance Directives   What are advance directives? Advance directives are legal documents that state your wishes and plans for medical care  These plans are made ahead of time in case you lose your ability to make decisions for yourself  Advance directives can apply to any medical decision, such as the treatments you want, and if you want to donate organs  What are the types of advance directives? There are many types of advance directives, and each state has rules about how to use them  You may choose a combination of any of the following:  · Living will: This is a written record of the treatment you want  You can also choose which treatments you do not want, which to limit, and which to stop at a certain time  This includes surgery, medicine, IV fluid, and tube feedings  · Durable power of  for healthcare [facility]): This is a written record that states who you want to make healthcare choices for you when you are unable to make them for yourself   This person, called a proxy, is usually a family member or a friend  You may choose more than 1 proxy  · Do not resuscitate (DNR) order:  A DNR order is used in case your heart stops beating or you stop breathing  It is a request not to have certain forms of treatment, such as CPR  A DNR order may be included in other types of advance directives  · Medical directive: This covers the care that you want if you are in a coma, near death, or unable to make decisions for yourself  You can list the treatments you want for each condition  Treatment may include pain medicine, surgery, blood transfusions, dialysis, IV or tube feedings, and a ventilator (breathing machine)  · Values history: This document has questions about your views, beliefs, and how you feel and think about life  This information can help others choose the care that you would choose  Why are advance directives important? An advance directive helps you control your care  Although spoken wishes may be used, it is better to have your wishes written down  Spoken wishes can be misunderstood, or not followed  Treatments may be given even if you do not want them  An advance directive may make it easier for your family to make difficult choices about your care  Weight Management   Why it is important to manage your weight:  Being overweight increases your risk of health conditions such as heart disease, high blood pressure, type 2 diabetes, and certain types of cancer  It can also increase your risk for osteoarthritis, sleep apnea, and other respiratory problems  Aim for a slow, steady weight loss  Even a small amount of weight loss can lower your risk of health problems  How to lose weight safely:  A safe and healthy way to lose weight is to eat fewer calories and get regular exercise  You can lose up about 1 pound a week by decreasing the number of calories you eat by 500 calories each day     Healthy meal plan for weight management:  A healthy meal plan includes a variety of foods, contains fewer calories, and helps you stay healthy  A healthy meal plan includes the following:  · Eat whole-grain foods more often  A healthy meal plan should contain fiber  Fiber is the part of grains, fruits, and vegetables that is not broken down by your body  Whole-grain foods are healthy and provide extra fiber in your diet  Some examples of whole-grain foods are whole-wheat breads and pastas, oatmeal, brown rice, and bulgur  · Eat a variety of vegetables every day  Include dark, leafy greens such as spinach, kale, brigette greens, and mustard greens  Eat yellow and orange vegetables such as carrots, sweet potatoes, and winter squash  · Eat a variety of fruits every day  Choose fresh or canned fruit (canned in its own juice or light syrup) instead of juice  Fruit juice has very little or no fiber  · Eat low-fat dairy foods  Drink fat-free (skim) milk or 1% milk  Eat fat-free yogurt and low-fat cottage cheese  Try low-fat cheeses such as mozzarella and other reduced-fat cheeses  · Choose meat and other protein foods that are low in fat  Choose beans or other legumes such as split peas or lentils  Choose fish, skinless poultry (chicken or turkey), or lean cuts of red meat (beef or pork)  Before you cook meat or poultry, cut off any visible fat  · Use less fat and oil  Try baking foods instead of frying them  Add less fat, such as margarine, sour cream, regular salad dressing and mayonnaise to foods  Eat fewer high-fat foods  Some examples of high-fat foods include french fries, doughnuts, ice cream, and cakes  · Eat fewer sweets  Limit foods and drinks that are high in sugar  This includes candy, cookies, regular soda, and sweetened drinks  Exercise:  Exercise at least 30 minutes per day on most days of the week  Some examples of exercise include walking, biking, dancing, and swimming  You can also fit in more physical activity by taking the stairs instead of the elevator or parking farther away from stores  Ask your healthcare provider about the best exercise plan for you  © Copyright [employer] 2018 Information is for End User's use only and may not be sold, redistributed or otherwise used for commercial purposes   All illustrations and images included in CareNotes® are the copyrighted property of A D A M , [employer]  or [address] [place] [address] [facility]

## 2021-08-31 NOTE — ASSESSMENT & PLAN NOTE
Follow up psychiatry  Depression Screening Follow-up Plan: Patient's depression screening was positive with a PHQ-2 score of 6  Their PHQ-9 score was 15  Patient assessed for underlying major depression  They have no active suicidal ideations  Brief counseling provided and recommend additional follow-up/re-evaluation next office visit  Continue with  to help with home situation with mother as this is a big stressor for pt
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
## 2021-09-03 DIAGNOSIS — F41.9 ANXIETY: ICD-10-CM

## 2021-09-03 RX ORDER — LORAZEPAM 2 MG/1
2 TABLET ORAL EVERY 6 HOURS PRN
Qty: 120 TABLET | Refills: 1 | Status: SHIPPED | OUTPATIENT
Start: 2021-09-03 | End: 2021-12-07 | Stop reason: SDUPTHER

## 2021-09-05 DIAGNOSIS — F41.9 ANXIETY: ICD-10-CM

## 2021-09-07 DIAGNOSIS — K44.9 HIATAL HERNIA WITH GERD AND ESOPHAGITIS: ICD-10-CM

## 2021-09-07 DIAGNOSIS — K21.00 HIATAL HERNIA WITH GERD AND ESOPHAGITIS: ICD-10-CM

## 2021-09-07 RX ORDER — PAROXETINE 30 MG/1
TABLET, FILM COATED ORAL
Qty: 90 TABLET | Refills: 1 | Status: SHIPPED | OUTPATIENT
Start: 2021-09-07 | End: 2021-11-06

## 2021-09-07 RX ORDER — ONDANSETRON 4 MG/1
4 TABLET, ORALLY DISINTEGRATING ORAL EVERY 6 HOURS PRN
Qty: 30 TABLET | Refills: 5 | Status: SHIPPED | OUTPATIENT
Start: 2021-09-07 | End: 2021-10-24 | Stop reason: HOSPADM

## 2021-09-13 DIAGNOSIS — K22.10 EROSIVE ESOPHAGITIS: ICD-10-CM

## 2021-09-13 RX ORDER — PANTOPRAZOLE SODIUM 40 MG/1
TABLET, DELAYED RELEASE ORAL
Qty: 60 TABLET | Refills: 0 | Status: SHIPPED | OUTPATIENT
Start: 2021-09-13 | End: 2021-09-27

## 2021-09-14 ENCOUNTER — TELEPHONE (OUTPATIENT)
Dept: GASTROENTEROLOGY | Facility: HOSPITAL | Age: 61
End: 2021-09-14

## 2021-09-22 ENCOUNTER — ANESTHESIA EVENT (OUTPATIENT)
Dept: GASTROENTEROLOGY | Facility: HOSPITAL | Age: 61
End: 2021-09-22

## 2021-09-22 ENCOUNTER — HOSPITAL ENCOUNTER (OUTPATIENT)
Dept: GASTROENTEROLOGY | Facility: HOSPITAL | Age: 61
Setting detail: OUTPATIENT SURGERY
Discharge: HOME | End: 2021-09-22
Attending: INTERNAL MEDICINE
Payer: COMMERCIAL

## 2021-09-22 ENCOUNTER — ANESTHESIA (OUTPATIENT)
Dept: GASTROENTEROLOGY | Facility: HOSPITAL | Age: 61
End: 2021-09-22

## 2021-09-22 VITALS
SYSTOLIC BLOOD PRESSURE: 148 MMHG | RESPIRATION RATE: 18 BRPM | OXYGEN SATURATION: 98 % | HEIGHT: 63 IN | WEIGHT: 180.34 LBS | TEMPERATURE: 97 F | DIASTOLIC BLOOD PRESSURE: 77 MMHG | BODY MASS INDEX: 31.95 KG/M2 | HEART RATE: 82 BPM

## 2021-09-22 DIAGNOSIS — Z98.890 HISTORY OF REPAIR OF HIATAL HERNIA: ICD-10-CM

## 2021-09-22 DIAGNOSIS — K21.00 GASTROESOPHAGEAL REFLUX DISEASE WITH ESOPHAGITIS WITHOUT HEMORRHAGE: ICD-10-CM

## 2021-09-22 DIAGNOSIS — Z87.19 HISTORY OF REPAIR OF HIATAL HERNIA: ICD-10-CM

## 2021-09-22 PROCEDURE — 88312 SPECIAL STAINS GROUP 1: CPT | Performed by: PATHOLOGY

## 2021-09-22 PROCEDURE — 88305 TISSUE EXAM BY PATHOLOGIST: CPT | Performed by: PATHOLOGY

## 2021-09-22 PROCEDURE — 88342 IMHCHEM/IMCYTCHM 1ST ANTB: CPT | Performed by: PATHOLOGY

## 2021-09-22 PROCEDURE — 43239 EGD BIOPSY SINGLE/MULTIPLE: CPT | Performed by: INTERNAL MEDICINE

## 2021-09-22 RX ORDER — PROPOFOL 10 MG/ML
INJECTION, EMULSION INTRAVENOUS AS NEEDED
Status: DISCONTINUED | OUTPATIENT
Start: 2021-09-22 | End: 2021-09-22

## 2021-09-22 RX ORDER — SODIUM CHLORIDE, SODIUM LACTATE, POTASSIUM CHLORIDE, CALCIUM CHLORIDE 600; 310; 30; 20 MG/100ML; MG/100ML; MG/100ML; MG/100ML
INJECTION, SOLUTION INTRAVENOUS CONTINUOUS PRN
Status: DISCONTINUED | OUTPATIENT
Start: 2021-09-22 | End: 2021-09-22

## 2021-09-22 RX ORDER — LIDOCAINE HYDROCHLORIDE 10 MG/ML
INJECTION, SOLUTION EPIDURAL; INFILTRATION; INTRACAUDAL; PERINEURAL AS NEEDED
Status: DISCONTINUED | OUTPATIENT
Start: 2021-09-22 | End: 2021-09-22

## 2021-09-22 RX ORDER — SODIUM CHLORIDE, SODIUM LACTATE, POTASSIUM CHLORIDE, CALCIUM CHLORIDE 600; 310; 30; 20 MG/100ML; MG/100ML; MG/100ML; MG/100ML
125 INJECTION, SOLUTION INTRAVENOUS CONTINUOUS
Status: DISCONTINUED | OUTPATIENT
Start: 2021-09-22 | End: 2021-09-26 | Stop reason: HOSPADM

## 2021-09-22 RX ADMIN — PROPOFOL 20 MG: 10 INJECTION, EMULSION INTRAVENOUS at 11:23

## 2021-09-22 RX ADMIN — PROPOFOL 100 MG: 10 INJECTION, EMULSION INTRAVENOUS at 11:20

## 2021-09-22 RX ADMIN — SODIUM CHLORIDE, SODIUM LACTATE, POTASSIUM CHLORIDE, AND CALCIUM CHLORIDE: .6; .31; .03; .02 INJECTION, SOLUTION INTRAVENOUS at 11:11

## 2021-09-22 RX ADMIN — SODIUM CHLORIDE, SODIUM LACTATE, POTASSIUM CHLORIDE, AND CALCIUM CHLORIDE 125 ML/HR: .6; .31; .03; .02 INJECTION, SOLUTION INTRAVENOUS at 10:21

## 2021-09-22 RX ADMIN — LIDOCAINE HYDROCHLORIDE 30 MG: 10 INJECTION, SOLUTION EPIDURAL; INFILTRATION; INTRACAUDAL; PERINEURAL at 11:14

## 2021-09-22 RX ADMIN — PROPOFOL 20 MG: 10 INJECTION, EMULSION INTRAVENOUS at 11:21

## 2021-09-22 NOTE — H&P
History and Physical - [facility]  Santosh Baumann 64 y o  female MRN: [id number]                  HPI: Santosh Baumann is a 64y o  year old female who presents for follow-up EGD, history of chronic heartburn, hiatal hernia, status post hiatal hernia repaired along with TIF    REVIEW OF SYSTEMS: Per the HPI, and otherwise unremarkable  Historical Information   Past Medical History:   Diagnosis Date    Anxiety     Arthritis     Back pain at L4-L5 level     Schreiber esophagus     Bulimia     Colon polyp     Disease of thyroid gland     hypothyroid    Ear problems     GERD (gastroesophageal reflux disease)     History of transfusion     Hyperlipidemia     Hypothyroidism     Leukopenia     Nasal congestion     NMS (neuroleptic malignant syndrome) 01/03/2020    Pneumonia     Rheumatoid arthritis involving right hip (HCC)     Sciatica     Se[facility] [address] [address] )     due to medication mix up 8/2018 only one historically    Seizures ([facility] [address] )     Synovial cyst of lumbar spine     Vertigo     Weight gain      Past Surgical History:   Procedure Laterality Date    BONE MARROW BIOPSY      BREAST SURGERY      enlargement with implants    CLOSED REDUCTION DISTAL FEMUR FRACTURE      COLONOSCOPY      COSMETIC SURGERY      HIP ARTHROPLASTY Right 1/2/2020    Procedure: REVISION TOTAL HIP ARTHROPLASTY WITH REPAIR OF PARIPROSTHETIC FRACTURE - POSTERIOR APPROACH;  Surgeon: Kevin Barhnart MD;  [facility];  Service: Orthopedics    VT ESOPHAGOGASTRODUODENOSCOPY TRANSORAL DIAGNOSTIC N/A 5/11/2021    Procedure: ESOPHAGOGASTRODUODENOSCOPY (EGD); Surgeon:  Rosa Isela Davidson MD;  [facility];  Service: General    VT ESOPHAGUS SURG PROCEDURE UNLISTED N/A 5/11/2021    Procedure: FUNDOPLICATION TRANSORAL INCISIONLESS;  Surgeon: Jennifer Carrera MD;  [facility];  Service: Gastroenterology    VT LAP, REPAIR PARAESOPHAGEAL HERNIA, INCL FUNDOPLASTY W/ MESH N/A 5/11/2021 Procedure: REPAIR HERNIA PARAESOPHAGEAL  LAPAROSCOPIC;  Surgeon: Yuliya Moreno MD;  [facility];  Service: General    TN TOTAL HIP ARTHROPLASTY Right 12/11/2019    Procedure: ARTHROPLASTY HIP TOTAL ANTERIOR;  Surgeon: Sylvester Mckinnon MD;  [facility];  Service: Orthopedics    RHINOPLASTY      SINUS SURGERY      TRANSORAL INCISIONLESS FUNDOPLICATION (TIF)  60/30/0021     Social History   Social History     Substance and Sexual Activity   Alcohol Use Not Currently     Social History     Substance and Sexual Activity   Drug Use Not Currently     Social History     Tobacco Use   Smoking Status Never Smoker   Smokeless Tobacco Never Used     Family History   Problem Relation Age of Onset    Uterine cancer Mother     Esophageal cancer Father     Colon cancer Maternal Grandmother        Meds/Allergies     (Not in a hospital admission)      Allergies   Allergen Reactions    Risperdal [Risperidone] Shortness Of Breath     Rapid heart beat, SOB    Zyprexa [Olanzapine] Shortness Of Breath     Rapid heartbeat    Latex Rash     Band aids, adhesives wears normal underwear, can eat bananas  USE PAPER TAPE       Objective     /73   Pulse 88   Temp (!) 97 2 °F (36 2 °C) (Temporal)   Resp 12   Ht 5' 3" (1 6 m)   Wt 81 8 kg (180 lb 5 4 oz)   SpO2 100%   BMI 31 95 kg/m²       PHYSICAL EXAM    Gen: NAD  CV: RRR  CHEST: Clear  ABD: soft, NT/ND  EXT: no edema      ASSESSMENT/PLAN:  This is a 64y o  year old female here for EGD, and she is stable and optimized for her procedure

## 2021-09-22 NOTE — ANESTHESIA PREPROCEDURE EVALUATION
Procedure:  EGD    Relevant Problems   CARDIO   (+) Essential hypertension   (+) Mixed hyperlipidemia      ENDO   (+) Acquired hypothyroidism      GI/HEPATIC   (+) Erosive esophagitis   (+) Gastroesophageal reflux disease with esophagitis without hemorrhage   (+) Hiatal hernia with gastroesophageal reflux disease and esophagitis      HEMATOLOGY   (+) Iron deficiency anemia due to chronic blood loss   (+) Symptomatic anemia      MUSCULOSKELETAL   (+) Chronic bilateral low back pain without sciatica   (+) DDD (degenerative disc disease), lumbar   (+) Hiatal hernia with gastroesophageal reflux disease and esophagitis   (+) Lumbar spondylosis      NEURO/PSYCH   (+) Anxiety   (+) ABIMAEL (generalized anxiety disorder)   (+) Word finding difficulty        Physical Exam    Airway    Mallampati score: II  TM Distance: >3 FB  Neck ROM: full     Dental       Cardiovascular  Rate: normal, Cardiovascular exam normal    Pulmonary  Pulmonary exam normal     Other Findings        Anesthesia Plan  ASA Score- 2     Anesthesia Type- IV sedation with anesthesia with ASA Monitors  Additional Monitors:   Airway Plan:           Plan Factors-Exercise tolerance (METS): >4 METS  Chart reviewed  Patient is not a current smoker  Patient instructed to abstain from smoking on day of procedure  Patient did not smoke on day of surgery  Induction- intravenous  Postoperative Plan-     Informed Consent- Anesthetic plan and risks discussed with patient  I personally reviewed this patient with the CRNA  Discussed and agreed on the Anesthesia Plan with the CRNA  Ranjit Venegas

## 2021-09-22 NOTE — ANESTHESIA POSTPROCEDURE EVALUATION
Post-Op Assessment Note    CV Status:  Stable  Pain Score: 0    Pain management: adequate     Mental Status:  Sleepy   Hydration Status:  Euvolemic   PONV Controlled:  Controlled   Airway Patency:  Patent      Post Op Vitals Reviewed: Yes      Staff: CRNA         No complications documented      /59 (09/22/21 1131)    Temp (!) 97 °F (36 1 °C) (09/22/21 1131)    Pulse 73 (09/22/21 1131)   Resp 19 (09/22/21 1131)    SpO2 98 % (09/22/21 1131)

## 2021-09-22 NOTE — NURSING NOTE
Pt reports her only method of transportation home is via an uber her insurance provides  RN spoke with Dr Anyi Oneill concerning safe transport home and per Dr Anyi Oneill, it is OKAY for patient to be transported home via an uber   Pt reports she has her mother at home, told Dr Anyi Oneill this and he is aware

## 2021-09-27 DIAGNOSIS — K22.10 EROSIVE ESOPHAGITIS: ICD-10-CM

## 2021-09-27 RX ORDER — PANTOPRAZOLE SODIUM 40 MG/1
TABLET, DELAYED RELEASE ORAL
Qty: 180 TABLET | Refills: 1 | Status: SHIPPED | OUTPATIENT
Start: 2021-09-27 | End: 2021-12-20 | Stop reason: SDUPTHER

## 2021-10-02 ENCOUNTER — APPOINTMENT (EMERGENCY)
Dept: RADIOLOGY | Facility: HOSPITAL | Age: 61
End: 2021-10-02
Payer: COMMERCIAL

## 2021-10-02 ENCOUNTER — HOSPITAL ENCOUNTER (EMERGENCY)
Facility: HOSPITAL | Age: 61
Discharge: HOME | End: 2021-10-02
Attending: EMERGENCY MEDICINE
Payer: COMMERCIAL

## 2021-10-02 VITALS
TEMPERATURE: 98.8 F | OXYGEN SATURATION: 96 % | HEART RATE: 74 BPM | RESPIRATION RATE: 15 BRPM | SYSTOLIC BLOOD PRESSURE: 149 MMHG | DIASTOLIC BLOOD PRESSURE: 82 MMHG

## 2021-10-02 DIAGNOSIS — R10.13 EPIGASTRIC PAIN: ICD-10-CM

## 2021-10-02 DIAGNOSIS — R11.2 NAUSEA AND VOMITING: ICD-10-CM

## 2021-10-02 DIAGNOSIS — E87.6 HYPOKALEMIA: ICD-10-CM

## 2021-10-02 DIAGNOSIS — E87.1 HYPONATREMIA: ICD-10-CM

## 2021-10-02 DIAGNOSIS — E86.0 DEHYDRATION: Primary | ICD-10-CM

## 2021-10-02 DIAGNOSIS — R19.7 DIARRHEA: ICD-10-CM

## 2021-10-02 LAB
ALBUMIN SERPL BCP-MCNC: 3.7 G/DL (ref 3.5–5)
ALP SERPL-CCNC: 95 U/L (ref 46–116)
ALT SERPL W P-5'-P-CCNC: 38 U/L (ref 12–78)
ANION GAP SERPL CALCULATED.3IONS-SCNC: 8 MMOL/L (ref 4–13)
AST SERPL W P-5'-P-CCNC: 30 U/L (ref 5–45)
BASOPHILS # BLD AUTO: 0.04 THOUSANDS/ΜL (ref 0–0.1)
BASOPHILS NFR BLD AUTO: 1 % (ref 0–1)
BILIRUB SERPL-MCNC: 0.41 MG/DL (ref 0.2–1)
BUN SERPL-MCNC: 8 MG/DL (ref 5–25)
CALCIUM SERPL-MCNC: 8.9 MG/DL (ref 8.3–10.1)
CHLORIDE SERPL-SCNC: 88 MMOL/L (ref 100–108)
CO2 SERPL-SCNC: 28 MMOL/L (ref 21–32)
CREAT SERPL-MCNC: 0.61 MG/DL (ref 0.6–1.3)
EOSINOPHIL # BLD AUTO: 0.09 THOUSAND/ΜL (ref 0–0.61)
EOSINOPHIL NFR BLD AUTO: 2 % (ref 0–6)
ERYTHROCYTE [DISTWIDTH] IN BLOOD BY AUTOMATED COUNT: 16.3 % (ref 11.6–15.1)
GFR SERPL CREATININE-BSD FRML MDRD: 98 ML/MIN/1.73SQ M
GLUCOSE SERPL-MCNC: 91 MG/DL (ref 65–140)
HCT VFR BLD AUTO: 34.1 % (ref 34.8–46.1)
HGB BLD-MCNC: 10.7 G/DL (ref 11.5–15.4)
IMM GRANULOCYTES # BLD AUTO: 0.02 THOUSAND/UL (ref 0–0.2)
IMM GRANULOCYTES NFR BLD AUTO: 0 % (ref 0–2)
LIPASE SERPL-CCNC: 99 U/L (ref 73–393)
LYMPHOCYTES # BLD AUTO: 0.54 THOUSANDS/ΜL (ref 0.6–4.47)
LYMPHOCYTES NFR BLD AUTO: 11 % (ref 14–44)
MCH RBC QN AUTO: 23.3 PG (ref 26.8–34.3)
MCHC RBC AUTO-ENTMCNC: 31.4 G/DL (ref 31.4–37.4)
MCV RBC AUTO: 74 FL (ref 82–98)
MONOCYTES # BLD AUTO: 0.43 THOUSAND/ΜL (ref 0.17–1.22)
MONOCYTES NFR BLD AUTO: 9 % (ref 4–12)
NEUTROPHILS # BLD AUTO: 3.7 THOUSANDS/ΜL (ref 1.85–7.62)
NEUTS SEG NFR BLD AUTO: 77 % (ref 43–75)
NRBC BLD AUTO-RTO: 0 /100 WBCS
PLATELET # BLD AUTO: 321 THOUSANDS/UL (ref 149–390)
PMV BLD AUTO: 8.5 FL (ref 8.9–12.7)
POTASSIUM SERPL-SCNC: 3.1 MMOL/L (ref 3.5–5.3)
PROT SERPL-MCNC: 7.4 G/DL (ref 6.4–8.2)
RBC # BLD AUTO: 4.6 MILLION/UL (ref 3.81–5.12)
SARS-COV-2 RNA RESP QL NAA+PROBE: NEGATIVE
SODIUM SERPL-SCNC: 124 MMOL/L (ref 136–145)
TROPONIN I SERPL-MCNC: <0.02 NG/ML
WBC # BLD AUTO: 4.82 THOUSAND/UL (ref 4.31–10.16)

## 2021-10-02 PROCEDURE — 83690 ASSAY OF LIPASE: CPT | Performed by: EMERGENCY MEDICINE

## 2021-10-02 PROCEDURE — 80053 COMPREHEN METABOLIC PANEL: CPT | Performed by: EMERGENCY MEDICINE

## 2021-10-02 PROCEDURE — U0005 INFEC AGEN DETEC AMPLI PROBE: HCPCS | Performed by: EMERGENCY MEDICINE

## 2021-10-02 PROCEDURE — 84484 ASSAY OF TROPONIN QUANT: CPT | Performed by: EMERGENCY MEDICINE

## 2021-10-02 PROCEDURE — 96374 THER/PROPH/DIAG INJ IV PUSH: CPT

## 2021-10-02 PROCEDURE — 93005 ELECTROCARDIOGRAM TRACING: CPT

## 2021-10-02 PROCEDURE — 96361 HYDRATE IV INFUSION ADD-ON: CPT

## 2021-10-02 PROCEDURE — 36415 COLL VENOUS BLD VENIPUNCTURE: CPT | Performed by: EMERGENCY MEDICINE

## 2021-10-02 PROCEDURE — 74177 CT ABD & PELVIS W/CONTRAST: CPT

## 2021-10-02 PROCEDURE — 99285 EMERGENCY DEPT VISIT HI MDM: CPT

## 2021-10-02 PROCEDURE — 71045 X-RAY EXAM CHEST 1 VIEW: CPT

## 2021-10-02 PROCEDURE — U0003 INFECTIOUS AGENT DETECTION BY NUCLEIC ACID (DNA OR RNA); SEVERE ACUTE RESPIRATORY SYNDROME CORONAVIRUS 2 (SARS-COV-2) (CORONAVIRUS DISEASE [COVID-19]), AMPLIFIED PROBE TECHNIQUE, MAKING USE OF HIGH THROUGHPUT TECHNOLOGIES AS DESCRIBED BY CMS-2020-01-R: HCPCS | Performed by: EMERGENCY MEDICINE

## 2021-10-02 PROCEDURE — 99285 EMERGENCY DEPT VISIT HI MDM: CPT | Performed by: EMERGENCY MEDICINE

## 2021-10-02 PROCEDURE — 85025 COMPLETE CBC W/AUTO DIFF WBC: CPT | Performed by: EMERGENCY MEDICINE

## 2021-10-02 RX ORDER — ONDANSETRON 2 MG/ML
4 INJECTION INTRAMUSCULAR; INTRAVENOUS ONCE
Status: COMPLETED | OUTPATIENT
Start: 2021-10-02 | End: 2021-10-02

## 2021-10-02 RX ORDER — ALUMINA, MAGNESIA, AND SIMETHICONE 2400; 2400; 240 MG/30ML; MG/30ML; MG/30ML
10 SUSPENSION ORAL EVERY 6 HOURS PRN
Qty: 355 ML | Refills: 0 | Status: ON HOLD | OUTPATIENT
Start: 2021-10-02 | End: 2022-01-12 | Stop reason: CLARIF

## 2021-10-02 RX ORDER — MAGNESIUM HYDROXIDE/ALUMINUM HYDROXICE/SIMETHICONE 120; 1200; 1200 MG/30ML; MG/30ML; MG/30ML
30 SUSPENSION ORAL ONCE
Status: COMPLETED | OUTPATIENT
Start: 2021-10-02 | End: 2021-10-02

## 2021-10-02 RX ORDER — DICYCLOMINE HCL 20 MG
20 TABLET ORAL ONCE
Status: COMPLETED | OUTPATIENT
Start: 2021-10-02 | End: 2021-10-02

## 2021-10-02 RX ORDER — ONDANSETRON 4 MG/1
4 TABLET, ORALLY DISINTEGRATING ORAL EVERY 6 HOURS PRN
Qty: 20 TABLET | Refills: 0 | Status: SHIPPED | OUTPATIENT
Start: 2021-10-02 | End: 2021-11-18 | Stop reason: SDUPTHER

## 2021-10-02 RX ADMIN — DICYCLOMINE HYDROCHLORIDE 20 MG: 20 TABLET ORAL at 08:56

## 2021-10-02 RX ADMIN — ALUMINUM HYDROXIDE, MAGNESIUM HYDROXIDE, AND SIMETHICONE 30 ML: 200; 200; 20 SUSPENSION ORAL at 10:03

## 2021-10-02 RX ADMIN — SODIUM CHLORIDE 1000 ML: 0.9 INJECTION, SOLUTION INTRAVENOUS at 08:53

## 2021-10-02 RX ADMIN — ONDANSETRON 4 MG: 2 INJECTION INTRAMUSCULAR; INTRAVENOUS at 08:56

## 2021-10-02 RX ADMIN — IOHEXOL 100 ML: 350 INJECTION, SOLUTION INTRAVENOUS at 09:38

## 2021-10-03 LAB
ATRIAL RATE: 79 BPM
P AXIS: 68 DEGREES
PR INTERVAL: 134 MS
QRS AXIS: 23 DEGREES
QRSD INTERVAL: 78 MS
QT INTERVAL: 370 MS
QTC INTERVAL: 424 MS
T WAVE AXIS: 73 DEGREES
VENTRICULAR RATE: 79 BPM

## 2021-10-03 PROCEDURE — 93010 ELECTROCARDIOGRAM REPORT: CPT | Performed by: INTERNAL MEDICINE

## 2021-10-09 ENCOUNTER — IMMUNIZATIONS (OUTPATIENT)
Dept: FAMILY MEDICINE CLINIC | Facility: HOSPITAL | Age: 61
End: 2021-10-09

## 2021-10-09 DIAGNOSIS — Z23 ENCOUNTER FOR IMMUNIZATION: Primary | ICD-10-CM

## 2021-10-09 PROCEDURE — 0001A SARS-COV-2 / COVID-19 MRNA VACCINE (PFIZER-BIONTECH) 30 MCG: CPT

## 2021-10-09 PROCEDURE — 91300 SARS-COV-2 / COVID-19 MRNA VACCINE (PFIZER-BIONTECH) 30 MCG: CPT

## 2021-10-19 ENCOUNTER — TELEPHONE (OUTPATIENT)
Dept: INTERNAL MEDICINE CLINIC | Facility: CLINIC | Age: 61
End: 2021-10-19

## 2021-10-19 DIAGNOSIS — R53.83 FATIGUE, UNSPECIFIED TYPE: Primary | ICD-10-CM

## 2021-10-21 ENCOUNTER — TELEPHONE (OUTPATIENT)
Dept: INTERNAL MEDICINE CLINIC | Facility: CLINIC | Age: 61
End: 2021-10-21

## 2021-10-22 ENCOUNTER — TELEPHONE (OUTPATIENT)
Dept: INTERNAL MEDICINE CLINIC | Facility: CLINIC | Age: 61
End: 2021-10-22

## 2021-10-22 ENCOUNTER — TELEPHONE (OUTPATIENT)
Dept: OTHER | Facility: OTHER | Age: 61
End: 2021-10-22

## 2021-10-22 ENCOUNTER — HOSPITAL ENCOUNTER (INPATIENT)
Facility: HOSPITAL | Age: 61
LOS: 2 days | Discharge: LEFT BEFORE DISCHARGE | DRG: 641 | End: 2021-10-24
Attending: EMERGENCY MEDICINE | Admitting: INTERNAL MEDICINE
Payer: COMMERCIAL

## 2021-10-22 DIAGNOSIS — D50.9 MICROCYTIC ANEMIA: ICD-10-CM

## 2021-10-22 DIAGNOSIS — R11.2 NAUSEA AND VOMITING: ICD-10-CM

## 2021-10-22 DIAGNOSIS — K22.70 BARRETT ESOPHAGUS: ICD-10-CM

## 2021-10-22 DIAGNOSIS — M27.3 ALVEOLAR OSTEITIS: ICD-10-CM

## 2021-10-22 DIAGNOSIS — E87.1 HYPONATREMIA: Primary | ICD-10-CM

## 2021-10-22 PROBLEM — K08.409 S/P TOOTH EXTRACTION: Status: ACTIVE | Noted: 2021-10-22

## 2021-10-22 LAB
ANION GAP SERPL CALCULATED.3IONS-SCNC: 4 MMOL/L (ref 4–13)
ANION GAP SERPL CALCULATED.3IONS-SCNC: 4 MMOL/L (ref 4–13)
BASOPHILS # BLD AUTO: 0.06 THOUSANDS/ΜL (ref 0–0.1)
BASOPHILS # BLD AUTO: 52 CELLS/UL (ref 0–200)
BASOPHILS NFR BLD AUTO: 1 % (ref 0–1)
BASOPHILS NFR BLD AUTO: 1.3 %
BUN SERPL-MCNC: 12 MG/DL (ref 7–25)
BUN SERPL-MCNC: 7 MG/DL (ref 5–25)
BUN SERPL-MCNC: 8 MG/DL (ref 5–25)
BUN/CREAT SERPL: ABNORMAL (CALC) (ref 6–22)
CALCIUM SERPL-MCNC: 8.5 MG/DL (ref 8.6–10.4)
CALCIUM SERPL-MCNC: 9.1 MG/DL (ref 8.3–10.1)
CALCIUM SERPL-MCNC: 9.4 MG/DL (ref 8.3–10.1)
CHLORIDE SERPL-SCNC: 86 MMOL/L (ref 98–110)
CHLORIDE SERPL-SCNC: 89 MMOL/L (ref 100–108)
CHLORIDE SERPL-SCNC: 91 MMOL/L (ref 100–108)
CHLORIDE UR-SCNC: <10 MMOL/L
CO2 SERPL-SCNC: 24 MMOL/L (ref 20–32)
CO2 SERPL-SCNC: 26 MMOL/L (ref 21–32)
CO2 SERPL-SCNC: 28 MMOL/L (ref 21–32)
CORTIS SERPL-MCNC: 2.5 UG/DL
CREAT SERPL-MCNC: 0.72 MG/DL (ref 0.5–0.99)
CREAT SERPL-MCNC: 0.79 MG/DL (ref 0.6–1.3)
CREAT SERPL-MCNC: 0.82 MG/DL (ref 0.6–1.3)
CREAT UR-MCNC: <13 MG/DL
EOSINOPHIL # BLD AUTO: 0.2 THOUSAND/ΜL (ref 0–0.61)
EOSINOPHIL # BLD AUTO: 220 CELLS/UL (ref 15–500)
EOSINOPHIL NFR BLD AUTO: 4 % (ref 0–6)
EOSINOPHIL NFR BLD AUTO: 5.5 %
ERYTHROCYTE [DISTWIDTH] IN BLOOD BY AUTOMATED COUNT: 16.2 % (ref 11.6–15.1)
ERYTHROCYTE [DISTWIDTH] IN BLOOD BY AUTOMATED COUNT: 16.3 % (ref 11–15)
GFR SERPL CREATININE-BSD FRML MDRD: 77 ML/MIN/1.73SQ M
GFR SERPL CREATININE-BSD FRML MDRD: 81 ML/MIN/1.73SQ M
GLUCOSE SERPL-MCNC: 116 MG/DL (ref 65–99)
GLUCOSE SERPL-MCNC: 90 MG/DL (ref 65–140)
GLUCOSE SERPL-MCNC: 94 MG/DL (ref 65–140)
HCT VFR BLD AUTO: 28 % (ref 35–45)
HCT VFR BLD AUTO: 31.6 % (ref 34.8–46.1)
HGB BLD-MCNC: 10.1 G/DL (ref 11.5–15.4)
HGB BLD-MCNC: 8.9 G/DL (ref 11.7–15.5)
IMM GRANULOCYTES # BLD AUTO: 0.01 THOUSAND/UL (ref 0–0.2)
IMM GRANULOCYTES NFR BLD AUTO: 0 % (ref 0–2)
LYMPHOCYTES # BLD AUTO: 0.97 THOUSANDS/ΜL (ref 0.6–4.47)
LYMPHOCYTES # BLD AUTO: 988 CELLS/UL (ref 850–3900)
LYMPHOCYTES NFR BLD AUTO: 20 % (ref 14–44)
LYMPHOCYTES NFR BLD AUTO: 24.7 %
MAGNESIUM SERPL-MCNC: 2 MG/DL (ref 1.6–2.6)
MCH RBC QN AUTO: 23.1 PG (ref 26.8–34.3)
MCH RBC QN AUTO: 23.1 PG (ref 27–33)
MCHC RBC AUTO-ENTMCNC: 31.8 G/DL (ref 32–36)
MCHC RBC AUTO-ENTMCNC: 32 G/DL (ref 31.4–37.4)
MCV RBC AUTO: 72 FL (ref 82–98)
MCV RBC AUTO: 72.7 FL (ref 80–100)
MONOCYTES # BLD AUTO: 0.45 THOUSAND/ΜL (ref 0.17–1.22)
MONOCYTES # BLD AUTO: 484 CELLS/UL (ref 200–950)
MONOCYTES NFR BLD AUTO: 10 % (ref 4–12)
MONOCYTES NFR BLD AUTO: 12.1 %
NEUTROPHILS # BLD AUTO: 2256 CELLS/UL (ref 1500–7800)
NEUTROPHILS # BLD AUTO: 3.06 THOUSANDS/ΜL (ref 1.85–7.62)
NEUTROPHILS NFR BLD AUTO: 56.4 %
NEUTS SEG NFR BLD AUTO: 65 % (ref 43–75)
NRBC BLD AUTO-RTO: 0 /100 WBCS
OSMOLALITY UR/SERPL-RTO: 252 MMOL/KG (ref 282–298)
OSMOLALITY UR: 71 MMOL/KG
PHOSPHATE SERPL-MCNC: 3.7 MG/DL (ref 2.3–4.1)
PLATELET # BLD AUTO: 285 THOUSAND/UL (ref 140–400)
PLATELET # BLD AUTO: 289 THOUSANDS/UL (ref 149–390)
PLATELET # BLD AUTO: 290 THOUSANDS/UL (ref 149–390)
PMV BLD AUTO: 8.5 FL (ref 8.9–12.7)
PMV BLD AUTO: 8.5 FL (ref 8.9–12.7)
PMV BLD REES-ECKER: 9.1 FL (ref 7.5–12.5)
POTASSIUM SERPL-SCNC: 3.6 MMOL/L (ref 3.5–5.3)
POTASSIUM SERPL-SCNC: 4 MMOL/L (ref 3.5–5.3)
POTASSIUM SERPL-SCNC: 4.1 MMOL/L (ref 3.5–5.3)
RBC # BLD AUTO: 3.85 MILLION/UL (ref 3.8–5.1)
RBC # BLD AUTO: 4.37 MILLION/UL (ref 3.81–5.12)
SL AMB EGFR AFRICAN AMERICAN: 105 ML/MIN/1.73M2
SL AMB EGFR NON AFRICAN AMERICAN: 90 ML/MIN/1.73M2
SODIUM 24H UR-SCNC: 12 MOL/L
SODIUM SERPL-SCNC: 119 MMOL/L (ref 135–146)
SODIUM SERPL-SCNC: 119 MMOL/L (ref 136–145)
SODIUM SERPL-SCNC: 123 MMOL/L (ref 136–145)
TROPONIN I SERPL-MCNC: <0.02 NG/ML
TROPONIN I SERPL-MCNC: <0.02 NG/ML
TSH SERPL DL<=0.05 MIU/L-ACNC: 2.87 UIU/ML (ref 0.36–3.74)
URATE SERPL-MCNC: 2.7 MG/DL (ref 2–6.8)
URATE UR-MCNC: 8.8 MG/DL
UUN 24H UR-MCNC: 93 MG/DL
WBC # BLD AUTO: 4 THOUSAND/UL (ref 3.8–10.8)
WBC # BLD AUTO: 4.75 THOUSAND/UL (ref 4.31–10.16)

## 2021-10-22 PROCEDURE — 36415 COLL VENOUS BLD VENIPUNCTURE: CPT

## 2021-10-22 PROCEDURE — 83735 ASSAY OF MAGNESIUM: CPT | Performed by: EMERGENCY MEDICINE

## 2021-10-22 PROCEDURE — 83930 ASSAY OF BLOOD OSMOLALITY: CPT | Performed by: EMERGENCY MEDICINE

## 2021-10-22 PROCEDURE — 85025 COMPLETE CBC W/AUTO DIFF WBC: CPT

## 2021-10-22 PROCEDURE — 99291 CRITICAL CARE FIRST HOUR: CPT | Performed by: EMERGENCY MEDICINE

## 2021-10-22 PROCEDURE — 80048 BASIC METABOLIC PNL TOTAL CA: CPT

## 2021-10-22 PROCEDURE — 84484 ASSAY OF TROPONIN QUANT: CPT | Performed by: INTERNAL MEDICINE

## 2021-10-22 PROCEDURE — 99223 1ST HOSP IP/OBS HIGH 75: CPT | Performed by: INTERNAL MEDICINE

## 2021-10-22 PROCEDURE — 84443 ASSAY THYROID STIM HORMONE: CPT | Performed by: EMERGENCY MEDICINE

## 2021-10-22 PROCEDURE — 84550 ASSAY OF BLOOD/URIC ACID: CPT | Performed by: EMERGENCY MEDICINE

## 2021-10-22 PROCEDURE — 64450 NJX AA&/STRD OTHER PN/BRANCH: CPT | Performed by: EMERGENCY MEDICINE

## 2021-10-22 PROCEDURE — 82533 TOTAL CORTISOL: CPT | Performed by: EMERGENCY MEDICINE

## 2021-10-22 PROCEDURE — 85049 AUTOMATED PLATELET COUNT: CPT | Performed by: INTERNAL MEDICINE

## 2021-10-22 PROCEDURE — 84100 ASSAY OF PHOSPHORUS: CPT | Performed by: EMERGENCY MEDICINE

## 2021-10-22 PROCEDURE — 80048 BASIC METABOLIC PNL TOTAL CA: CPT | Performed by: INTERNAL MEDICINE

## 2021-10-22 PROCEDURE — 84560 ASSAY OF URINE/URIC ACID: CPT | Performed by: EMERGENCY MEDICINE

## 2021-10-22 PROCEDURE — 83935 ASSAY OF URINE OSMOLALITY: CPT | Performed by: EMERGENCY MEDICINE

## 2021-10-22 PROCEDURE — 99284 EMERGENCY DEPT VISIT MOD MDM: CPT

## 2021-10-22 PROCEDURE — 82570 ASSAY OF URINE CREATININE: CPT | Performed by: EMERGENCY MEDICINE

## 2021-10-22 PROCEDURE — 84540 ASSAY OF URINE/UREA-N: CPT | Performed by: EMERGENCY MEDICINE

## 2021-10-22 PROCEDURE — 82436 ASSAY OF URINE CHLORIDE: CPT | Performed by: EMERGENCY MEDICINE

## 2021-10-22 PROCEDURE — 84300 ASSAY OF URINE SODIUM: CPT | Performed by: EMERGENCY MEDICINE

## 2021-10-22 RX ORDER — MAGNESIUM HYDROXIDE/ALUMINUM HYDROXICE/SIMETHICONE 120; 1200; 1200 MG/30ML; MG/30ML; MG/30ML
30 SUSPENSION ORAL EVERY 6 HOURS PRN
Status: DISCONTINUED | OUTPATIENT
Start: 2021-10-22 | End: 2021-10-24 | Stop reason: HOSPADM

## 2021-10-22 RX ORDER — MAGNESIUM HYDROXIDE/ALUMINUM HYDROXICE/SIMETHICONE 120; 1200; 1200 MG/30ML; MG/30ML; MG/30ML
15 SUSPENSION ORAL EVERY 6 HOURS PRN
Status: DISCONTINUED | OUTPATIENT
Start: 2021-10-22 | End: 2021-10-22 | Stop reason: SDUPTHER

## 2021-10-22 RX ORDER — ACETAMINOPHEN 325 MG/1
650 TABLET ORAL EVERY 6 HOURS PRN
Status: DISCONTINUED | OUTPATIENT
Start: 2021-10-22 | End: 2021-10-24 | Stop reason: HOSPADM

## 2021-10-22 RX ORDER — LEVOTHYROXINE SODIUM 0.03 MG/1
25 TABLET ORAL
Status: DISCONTINUED | OUTPATIENT
Start: 2021-10-23 | End: 2021-10-24 | Stop reason: HOSPADM

## 2021-10-22 RX ORDER — LORAZEPAM 1 MG/1
2 TABLET ORAL EVERY 6 HOURS PRN
Status: DISCONTINUED | OUTPATIENT
Start: 2021-10-22 | End: 2021-10-24 | Stop reason: HOSPADM

## 2021-10-22 RX ORDER — ACETAMINOPHEN 160 MG/5ML
650 SUSPENSION, ORAL (FINAL DOSE FORM) ORAL EVERY 8 HOURS
Status: DISCONTINUED | OUTPATIENT
Start: 2021-10-22 | End: 2021-10-24 | Stop reason: HOSPADM

## 2021-10-22 RX ORDER — DOCUSATE SODIUM 100 MG/1
100 CAPSULE, LIQUID FILLED ORAL 2 TIMES DAILY
Status: DISCONTINUED | OUTPATIENT
Start: 2021-10-22 | End: 2021-10-24 | Stop reason: HOSPADM

## 2021-10-22 RX ORDER — ACETAMINOPHEN 325 MG/1
975 TABLET ORAL ONCE
Status: DISCONTINUED | OUTPATIENT
Start: 2021-10-22 | End: 2021-10-24 | Stop reason: HOSPADM

## 2021-10-22 RX ORDER — ASPIRIN 81 MG/1
81 TABLET ORAL DAILY
Status: DISCONTINUED | OUTPATIENT
Start: 2021-10-23 | End: 2021-10-24 | Stop reason: HOSPADM

## 2021-10-22 RX ORDER — HEPARIN SODIUM 5000 [USP'U]/ML
5000 INJECTION, SOLUTION INTRAVENOUS; SUBCUTANEOUS EVERY 8 HOURS SCHEDULED
Status: DISCONTINUED | OUTPATIENT
Start: 2021-10-22 | End: 2021-10-24 | Stop reason: HOSPADM

## 2021-10-22 RX ORDER — SENNOSIDES 8.6 MG
1 TABLET ORAL DAILY
Status: DISCONTINUED | OUTPATIENT
Start: 2021-10-23 | End: 2021-10-24 | Stop reason: HOSPADM

## 2021-10-22 RX ORDER — ATORVASTATIN CALCIUM 40 MG/1
40 TABLET, FILM COATED ORAL EVERY EVENING
Status: DISCONTINUED | OUTPATIENT
Start: 2021-10-22 | End: 2021-10-24 | Stop reason: HOSPADM

## 2021-10-22 RX ORDER — BUPIVACAINE HYDROCHLORIDE 5 MG/ML
10 INJECTION, SOLUTION EPIDURAL; INTRACAUDAL ONCE
Status: COMPLETED | OUTPATIENT
Start: 2021-10-22 | End: 2021-10-22

## 2021-10-22 RX ORDER — ONDANSETRON 2 MG/ML
4 INJECTION INTRAMUSCULAR; INTRAVENOUS EVERY 6 HOURS PRN
Status: DISCONTINUED | OUTPATIENT
Start: 2021-10-22 | End: 2021-10-24 | Stop reason: HOSPADM

## 2021-10-22 RX ORDER — LIDOCAINE HYDROCHLORIDE 20 MG/ML
15 SOLUTION OROPHARYNGEAL 4 TIMES DAILY PRN
Status: DISCONTINUED | OUTPATIENT
Start: 2021-10-22 | End: 2021-10-24 | Stop reason: HOSPADM

## 2021-10-22 RX ORDER — FERROUS SULFATE 325(65) MG
325 TABLET ORAL 2 TIMES DAILY WITH MEALS
Status: DISCONTINUED | OUTPATIENT
Start: 2021-10-23 | End: 2021-10-24 | Stop reason: HOSPADM

## 2021-10-22 RX ORDER — SODIUM CHLORIDE 9 MG/ML
30 INJECTION, SOLUTION INTRAVENOUS CONTINUOUS
Status: DISCONTINUED | OUTPATIENT
Start: 2021-10-22 | End: 2021-10-23

## 2021-10-22 RX ORDER — FAMOTIDINE 20 MG/1
20 TABLET, FILM COATED ORAL DAILY
Status: DISCONTINUED | OUTPATIENT
Start: 2021-10-23 | End: 2021-10-24 | Stop reason: HOSPADM

## 2021-10-22 RX ORDER — PANTOPRAZOLE SODIUM 40 MG/1
40 TABLET, DELAYED RELEASE ORAL
Status: DISCONTINUED | OUTPATIENT
Start: 2021-10-23 | End: 2021-10-24 | Stop reason: HOSPADM

## 2021-10-22 RX ORDER — ACETAMINOPHEN 325 MG/1
975 TABLET ORAL EVERY 8 HOURS SCHEDULED
Status: DISCONTINUED | OUTPATIENT
Start: 2021-10-22 | End: 2021-10-22

## 2021-10-22 RX ORDER — ZIPRASIDONE HYDROCHLORIDE 40 MG/1
80 CAPSULE ORAL DAILY
Status: DISCONTINUED | OUTPATIENT
Start: 2021-10-23 | End: 2021-10-24 | Stop reason: HOSPADM

## 2021-10-22 RX ORDER — MECLIZINE HYDROCHLORIDE 25 MG/1
25 TABLET ORAL 3 TIMES DAILY PRN
Status: DISCONTINUED | OUTPATIENT
Start: 2021-10-22 | End: 2021-10-24 | Stop reason: HOSPADM

## 2021-10-22 RX ORDER — QUETIAPINE FUMARATE 100 MG/1
400 TABLET, FILM COATED ORAL
Status: DISCONTINUED | OUTPATIENT
Start: 2021-10-22 | End: 2021-10-24 | Stop reason: HOSPADM

## 2021-10-22 RX ADMIN — ACETAMINOPHEN 650 MG: 650 SUSPENSION ORAL at 23:46

## 2021-10-22 RX ADMIN — ATORVASTATIN CALCIUM 40 MG: 40 TABLET, FILM COATED ORAL at 23:47

## 2021-10-22 RX ADMIN — QUETIAPINE FUMARATE 400 MG: 100 TABLET ORAL at 23:47

## 2021-10-22 RX ADMIN — BUPIVACAINE HYDROCHLORIDE 10 ML: 5 INJECTION, SOLUTION EPIDURAL; INTRACAUDAL; PERINEURAL at 16:21

## 2021-10-22 RX ADMIN — SODIUM CHLORIDE 60 ML/HR: 0.9 INJECTION, SOLUTION INTRAVENOUS at 20:37

## 2021-10-22 RX ADMIN — HEPARIN SODIUM 5000 UNITS: 5000 INJECTION INTRAVENOUS; SUBCUTANEOUS at 23:47

## 2021-10-23 LAB
ALBUMIN SERPL BCP-MCNC: 3.6 G/DL (ref 3.5–5)
ALP SERPL-CCNC: 101 U/L (ref 46–116)
ALT SERPL W P-5'-P-CCNC: 38 U/L (ref 12–78)
ANION GAP SERPL CALCULATED.3IONS-SCNC: 4 MMOL/L (ref 4–13)
ANION GAP SERPL CALCULATED.3IONS-SCNC: 6 MMOL/L (ref 4–13)
ANION GAP SERPL CALCULATED.3IONS-SCNC: 7 MMOL/L (ref 4–13)
ANION GAP SERPL CALCULATED.3IONS-SCNC: 8 MMOL/L (ref 4–13)
AST SERPL W P-5'-P-CCNC: 34 U/L (ref 5–45)
BILIRUB SERPL-MCNC: 0.27 MG/DL (ref 0.2–1)
BUN SERPL-MCNC: 5 MG/DL (ref 5–25)
BUN SERPL-MCNC: 8 MG/DL (ref 5–25)
CALCIUM SERPL-MCNC: 9.1 MG/DL (ref 8.3–10.1)
CALCIUM SERPL-MCNC: 9.3 MG/DL (ref 8.3–10.1)
CHLORIDE SERPL-SCNC: 93 MMOL/L (ref 100–108)
CHLORIDE SERPL-SCNC: 94 MMOL/L (ref 100–108)
CHLORIDE SERPL-SCNC: 95 MMOL/L (ref 100–108)
CHLORIDE SERPL-SCNC: 96 MMOL/L (ref 100–108)
CO2 SERPL-SCNC: 24 MMOL/L (ref 21–32)
CO2 SERPL-SCNC: 26 MMOL/L (ref 21–32)
CO2 SERPL-SCNC: 27 MMOL/L (ref 21–32)
CO2 SERPL-SCNC: 28 MMOL/L (ref 21–32)
CREAT SERPL-MCNC: 0.69 MG/DL (ref 0.6–1.3)
CREAT SERPL-MCNC: 0.79 MG/DL (ref 0.6–1.3)
CREAT SERPL-MCNC: 0.82 MG/DL (ref 0.6–1.3)
CREAT SERPL-MCNC: 0.86 MG/DL (ref 0.6–1.3)
GFR SERPL CREATININE-BSD FRML MDRD: 73 ML/MIN/1.73SQ M
GFR SERPL CREATININE-BSD FRML MDRD: 77 ML/MIN/1.73SQ M
GFR SERPL CREATININE-BSD FRML MDRD: 81 ML/MIN/1.73SQ M
GFR SERPL CREATININE-BSD FRML MDRD: 94 ML/MIN/1.73SQ M
GLUCOSE SERPL-MCNC: 108 MG/DL (ref 65–140)
GLUCOSE SERPL-MCNC: 81 MG/DL (ref 65–140)
GLUCOSE SERPL-MCNC: 96 MG/DL (ref 65–140)
GLUCOSE SERPL-MCNC: 96 MG/DL (ref 65–140)
MAGNESIUM SERPL-MCNC: 2.2 MG/DL (ref 1.6–2.6)
PHOSPHATE SERPL-MCNC: 4.8 MG/DL (ref 2.3–4.1)
POTASSIUM SERPL-SCNC: 3.1 MMOL/L (ref 3.5–5.3)
POTASSIUM SERPL-SCNC: 3.1 MMOL/L (ref 3.5–5.3)
POTASSIUM SERPL-SCNC: 3.4 MMOL/L (ref 3.5–5.3)
POTASSIUM SERPL-SCNC: 3.6 MMOL/L (ref 3.5–5.3)
PROT SERPL-MCNC: 7.5 G/DL (ref 6.4–8.2)
SODIUM SERPL-SCNC: 125 MMOL/L (ref 136–145)
SODIUM SERPL-SCNC: 127 MMOL/L (ref 136–145)
SODIUM SERPL-SCNC: 127 MMOL/L (ref 136–145)
SODIUM SERPL-SCNC: 129 MMOL/L (ref 136–145)
TROPONIN I SERPL-MCNC: <0.02 NG/ML

## 2021-10-23 PROCEDURE — 84100 ASSAY OF PHOSPHORUS: CPT | Performed by: INTERNAL MEDICINE

## 2021-10-23 PROCEDURE — 80048 BASIC METABOLIC PNL TOTAL CA: CPT | Performed by: INTERNAL MEDICINE

## 2021-10-23 PROCEDURE — 83735 ASSAY OF MAGNESIUM: CPT | Performed by: INTERNAL MEDICINE

## 2021-10-23 PROCEDURE — 84484 ASSAY OF TROPONIN QUANT: CPT | Performed by: INTERNAL MEDICINE

## 2021-10-23 PROCEDURE — 99232 SBSQ HOSP IP/OBS MODERATE 35: CPT | Performed by: PHYSICIAN ASSISTANT

## 2021-10-23 PROCEDURE — 80053 COMPREHEN METABOLIC PANEL: CPT | Performed by: INTERNAL MEDICINE

## 2021-10-23 PROCEDURE — 99222 1ST HOSP IP/OBS MODERATE 55: CPT | Performed by: INTERNAL MEDICINE

## 2021-10-23 RX ORDER — DEXTROSE MONOHYDRATE 50 MG/ML
100 INJECTION, SOLUTION INTRAVENOUS CONTINUOUS
Status: DISCONTINUED | OUTPATIENT
Start: 2021-10-23 | End: 2021-10-23

## 2021-10-23 RX ORDER — POTASSIUM CHLORIDE 20 MEQ/1
40 TABLET, EXTENDED RELEASE ORAL ONCE
Status: COMPLETED | OUTPATIENT
Start: 2021-10-23 | End: 2021-10-23

## 2021-10-23 RX ADMIN — PAROXETINE 30 MG: 10 TABLET, FILM COATED ORAL at 08:02

## 2021-10-23 RX ADMIN — PANTOPRAZOLE SODIUM 40 MG: 40 TABLET, DELAYED RELEASE ORAL at 08:03

## 2021-10-23 RX ADMIN — DEXTROSE 100 ML/HR: 5 SOLUTION INTRAVENOUS at 09:06

## 2021-10-23 RX ADMIN — ONDANSETRON 4 MG: 2 INJECTION INTRAMUSCULAR; INTRAVENOUS at 08:04

## 2021-10-23 RX ADMIN — HEPARIN SODIUM 5000 UNITS: 5000 INJECTION INTRAVENOUS; SUBCUTANEOUS at 21:52

## 2021-10-23 RX ADMIN — FAMOTIDINE 20 MG: 20 TABLET ORAL at 08:02

## 2021-10-23 RX ADMIN — QUETIAPINE FUMARATE 400 MG: 100 TABLET ORAL at 21:51

## 2021-10-23 RX ADMIN — B-COMPLEX W/ C & FOLIC ACID TAB 1 TABLET: TAB at 08:02

## 2021-10-23 RX ADMIN — ACETAMINOPHEN 650 MG: 650 SUSPENSION ORAL at 08:04

## 2021-10-23 RX ADMIN — FERROUS SULFATE TAB 325 MG (65 MG ELEMENTAL FE) 325 MG: 325 (65 FE) TAB at 08:02

## 2021-10-23 RX ADMIN — POTASSIUM CHLORIDE 40 MEQ: 1500 TABLET, EXTENDED RELEASE ORAL at 11:44

## 2021-10-23 RX ADMIN — ZIPRASIDONE HYDROCHLORIDE 80 MG: 40 CAPSULE ORAL at 08:02

## 2021-10-23 RX ADMIN — LORAZEPAM 2 MG: 1 TABLET ORAL at 21:51

## 2021-10-23 RX ADMIN — LEVOTHYROXINE SODIUM 25 MCG: 25 TABLET ORAL at 08:03

## 2021-10-23 RX ADMIN — POTASSIUM CHLORIDE 40 MEQ: 1500 TABLET, EXTENDED RELEASE ORAL at 22:31

## 2021-10-23 RX ADMIN — FERROUS SULFATE TAB 325 MG (65 MG ELEMENTAL FE) 325 MG: 325 (65 FE) TAB at 17:35

## 2021-10-23 RX ADMIN — ASPIRIN 81 MG: 81 TABLET, COATED ORAL at 08:02

## 2021-10-23 RX ADMIN — ACETAMINOPHEN 650 MG: 650 SUSPENSION ORAL at 17:35

## 2021-10-23 RX ADMIN — HEPARIN SODIUM 5000 UNITS: 5000 INJECTION INTRAVENOUS; SUBCUTANEOUS at 08:03

## 2021-10-23 RX ADMIN — ATORVASTATIN CALCIUM 40 MG: 40 TABLET, FILM COATED ORAL at 17:35

## 2021-10-24 VITALS
DIASTOLIC BLOOD PRESSURE: 73 MMHG | RESPIRATION RATE: 18 BRPM | SYSTOLIC BLOOD PRESSURE: 130 MMHG | OXYGEN SATURATION: 92 % | HEART RATE: 74 BPM | TEMPERATURE: 97.9 F

## 2021-10-24 LAB
ANION GAP SERPL CALCULATED.3IONS-SCNC: 4 MMOL/L (ref 4–13)
BUN SERPL-MCNC: 5 MG/DL (ref 5–25)
CALCIUM SERPL-MCNC: 9.7 MG/DL (ref 8.3–10.1)
CHLORIDE SERPL-SCNC: 100 MMOL/L (ref 100–108)
CO2 SERPL-SCNC: 28 MMOL/L (ref 21–32)
CREAT SERPL-MCNC: 0.81 MG/DL (ref 0.6–1.3)
ERYTHROCYTE [DISTWIDTH] IN BLOOD BY AUTOMATED COUNT: 16.7 % (ref 11.6–15.1)
GFR SERPL CREATININE-BSD FRML MDRD: 79 ML/MIN/1.73SQ M
GLUCOSE SERPL-MCNC: 89 MG/DL (ref 65–140)
HCT VFR BLD AUTO: 38.5 % (ref 34.8–46.1)
HGB BLD-MCNC: 11.8 G/DL (ref 11.5–15.4)
MAGNESIUM SERPL-MCNC: 2.3 MG/DL (ref 1.6–2.6)
MCH RBC QN AUTO: 23 PG (ref 26.8–34.3)
MCHC RBC AUTO-ENTMCNC: 30.6 G/DL (ref 31.4–37.4)
MCV RBC AUTO: 75 FL (ref 82–98)
PLATELET # BLD AUTO: 336 THOUSANDS/UL (ref 149–390)
PMV BLD AUTO: 8.5 FL (ref 8.9–12.7)
POTASSIUM SERPL-SCNC: 3.9 MMOL/L (ref 3.5–5.3)
RBC # BLD AUTO: 5.13 MILLION/UL (ref 3.81–5.12)
SODIUM SERPL-SCNC: 132 MMOL/L (ref 136–145)
WBC # BLD AUTO: 2.65 THOUSAND/UL (ref 4.31–10.16)

## 2021-10-24 PROCEDURE — 99239 HOSP IP/OBS DSCHRG MGMT >30: CPT | Performed by: PHYSICIAN ASSISTANT

## 2021-10-24 PROCEDURE — 99232 SBSQ HOSP IP/OBS MODERATE 35: CPT | Performed by: INTERNAL MEDICINE

## 2021-10-24 PROCEDURE — 97166 OT EVAL MOD COMPLEX 45 MIN: CPT

## 2021-10-24 PROCEDURE — 80048 BASIC METABOLIC PNL TOTAL CA: CPT | Performed by: PHYSICIAN ASSISTANT

## 2021-10-24 PROCEDURE — 83735 ASSAY OF MAGNESIUM: CPT | Performed by: PHYSICIAN ASSISTANT

## 2021-10-24 PROCEDURE — 97162 PT EVAL MOD COMPLEX 30 MIN: CPT

## 2021-10-24 PROCEDURE — 85027 COMPLETE CBC AUTOMATED: CPT | Performed by: PHYSICIAN ASSISTANT

## 2021-10-24 RX ADMIN — ASPIRIN 81 MG: 81 TABLET, COATED ORAL at 11:03

## 2021-10-24 RX ADMIN — FAMOTIDINE 20 MG: 20 TABLET ORAL at 11:03

## 2021-10-24 RX ADMIN — ACETAMINOPHEN 650 MG: 650 SUSPENSION ORAL at 11:02

## 2021-10-24 RX ADMIN — LEVOTHYROXINE SODIUM 25 MCG: 25 TABLET ORAL at 05:39

## 2021-10-24 RX ADMIN — HEPARIN SODIUM 5000 UNITS: 5000 INJECTION INTRAVENOUS; SUBCUTANEOUS at 05:39

## 2021-10-24 RX ADMIN — PANTOPRAZOLE SODIUM 40 MG: 40 TABLET, DELAYED RELEASE ORAL at 05:38

## 2021-10-24 RX ADMIN — B-COMPLEX W/ C & FOLIC ACID TAB 1 TABLET: TAB at 11:04

## 2021-10-24 RX ADMIN — FERROUS SULFATE TAB 325 MG (65 MG ELEMENTAL FE) 325 MG: 325 (65 FE) TAB at 11:02

## 2021-10-24 RX ADMIN — PAROXETINE 30 MG: 10 TABLET, FILM COATED ORAL at 11:03

## 2021-10-26 ENCOUNTER — TRANSITIONAL CARE MANAGEMENT (OUTPATIENT)
Dept: INTERNAL MEDICINE CLINIC | Facility: CLINIC | Age: 61
End: 2021-10-26

## 2021-10-27 ENCOUNTER — TELEPHONE (OUTPATIENT)
Dept: UROLOGY | Facility: AMBULATORY SURGERY CENTER | Age: 61
End: 2021-10-27

## 2021-11-04 ENCOUNTER — OFFICE VISIT (OUTPATIENT)
Dept: INTERNAL MEDICINE CLINIC | Facility: CLINIC | Age: 61
End: 2021-11-04
Payer: COMMERCIAL

## 2021-11-04 VITALS
HEIGHT: 63 IN | OXYGEN SATURATION: 98 % | WEIGHT: 188.9 LBS | DIASTOLIC BLOOD PRESSURE: 82 MMHG | HEART RATE: 88 BPM | SYSTOLIC BLOOD PRESSURE: 138 MMHG | BODY MASS INDEX: 33.47 KG/M2

## 2021-11-04 DIAGNOSIS — E87.1 HYPONATREMIA: Primary | ICD-10-CM

## 2021-11-04 DIAGNOSIS — F25.9 SCHIZOAFFECTIVE DISORDER, UNSPECIFIED TYPE (HCC): ICD-10-CM

## 2021-11-04 DIAGNOSIS — Z23 NEEDS FLU SHOT: ICD-10-CM

## 2021-11-04 DIAGNOSIS — E03.9 ACQUIRED HYPOTHYROIDISM: ICD-10-CM

## 2021-11-04 DIAGNOSIS — D50.0 IRON DEFICIENCY ANEMIA DUE TO CHRONIC BLOOD LOSS: ICD-10-CM

## 2021-11-04 PROCEDURE — 90682 RIV4 VACC RECOMBINANT DNA IM: CPT

## 2021-11-04 PROCEDURE — G0008 ADMIN INFLUENZA VIRUS VAC: HCPCS

## 2021-11-04 PROCEDURE — 99495 TRANSJ CARE MGMT MOD F2F 14D: CPT | Performed by: INTERNAL MEDICINE

## 2021-11-04 PROCEDURE — 1111F DSCHRG MED/CURRENT MED MERGE: CPT | Performed by: INTERNAL MEDICINE

## 2021-11-04 RX ORDER — IBUPROFEN 600 MG/1
TABLET ORAL
COMMUNITY
Start: 2021-10-18 | End: 2022-01-12 | Stop reason: HOSPADM

## 2021-11-16 DIAGNOSIS — F25.1 SCHIZOAFFECTIVE DISORDER, DEPRESSIVE TYPE (HCC): ICD-10-CM

## 2021-11-16 DIAGNOSIS — L23.9 DERMAL HYPERSENSITIVITY REACTION: ICD-10-CM

## 2021-11-16 RX ORDER — TRIAMCINOLONE ACETONIDE 1 MG/G
1 CREAM TOPICAL 2 TIMES DAILY
Qty: 80 G | Refills: 5 | Status: SHIPPED | OUTPATIENT
Start: 2021-11-16 | End: 2021-12-16 | Stop reason: SDUPTHER

## 2021-11-16 RX ORDER — QUETIAPINE FUMARATE 400 MG/1
400 TABLET, FILM COATED ORAL
Qty: 90 TABLET | Refills: 3 | Status: SHIPPED | OUTPATIENT
Start: 2021-11-16 | End: 2021-12-08 | Stop reason: SDUPTHER

## 2021-11-18 DIAGNOSIS — R11.2 NAUSEA AND VOMITING: ICD-10-CM

## 2021-11-18 RX ORDER — ONDANSETRON 4 MG/1
4 TABLET, ORALLY DISINTEGRATING ORAL EVERY 6 HOURS PRN
Qty: 20 TABLET | Refills: 0 | Status: SHIPPED | OUTPATIENT
Start: 2021-11-18 | End: 2021-12-16 | Stop reason: SDUPTHER

## 2021-12-06 ENCOUNTER — OFFICE VISIT (OUTPATIENT)
Dept: PODIATRY | Facility: CLINIC | Age: 61
End: 2021-12-06
Payer: COMMERCIAL

## 2021-12-06 VITALS
BODY MASS INDEX: 32.85 KG/M2 | SYSTOLIC BLOOD PRESSURE: 115 MMHG | WEIGHT: 185.4 LBS | HEART RATE: 102 BPM | DIASTOLIC BLOOD PRESSURE: 76 MMHG | HEIGHT: 63 IN

## 2021-12-06 DIAGNOSIS — M20.41 HAMMER TOES OF BOTH FEET: ICD-10-CM

## 2021-12-06 DIAGNOSIS — B35.1 ONYCHOMYCOSIS: Primary | ICD-10-CM

## 2021-12-06 DIAGNOSIS — S90.415A ABRASION OF FOURTH TOE OF LEFT FOOT, INITIAL ENCOUNTER: ICD-10-CM

## 2021-12-06 DIAGNOSIS — M20.42 HAMMER TOES OF BOTH FEET: ICD-10-CM

## 2021-12-06 PROCEDURE — 99212 OFFICE O/P EST SF 10 MIN: CPT | Performed by: PODIATRIST

## 2021-12-06 PROCEDURE — 3008F BODY MASS INDEX DOCD: CPT | Performed by: PODIATRIST

## 2021-12-06 PROCEDURE — 1036F TOBACCO NON-USER: CPT | Performed by: PODIATRIST

## 2021-12-07 DIAGNOSIS — F41.9 ANXIETY: ICD-10-CM

## 2021-12-07 RX ORDER — LORAZEPAM 2 MG/1
2 TABLET ORAL EVERY 6 HOURS PRN
Qty: 120 TABLET | Refills: 1 | Status: SHIPPED | OUTPATIENT
Start: 2021-12-07 | End: 2022-02-03 | Stop reason: SDUPTHER

## 2021-12-08 DIAGNOSIS — F25.1 SCHIZOAFFECTIVE DISORDER, DEPRESSIVE TYPE (HCC): ICD-10-CM

## 2021-12-08 RX ORDER — LORAZEPAM 2 MG/1
TABLET ORAL
Qty: 120 TABLET | Refills: 1 | Status: ON HOLD | OUTPATIENT
Start: 2021-12-08 | End: 2022-01-12 | Stop reason: CLARIF

## 2021-12-08 RX ORDER — QUETIAPINE FUMARATE 400 MG/1
400 TABLET, FILM COATED ORAL
Qty: 90 TABLET | Refills: 3 | Status: ON HOLD | OUTPATIENT
Start: 2021-12-08 | End: 2022-01-12 | Stop reason: CLARIF

## 2021-12-16 DIAGNOSIS — R11.2 NAUSEA AND VOMITING: ICD-10-CM

## 2021-12-16 DIAGNOSIS — L23.9 DERMAL HYPERSENSITIVITY REACTION: ICD-10-CM

## 2021-12-16 RX ORDER — ONDANSETRON 4 MG/1
4 TABLET, ORALLY DISINTEGRATING ORAL EVERY 6 HOURS PRN
Qty: 20 TABLET | Refills: 1 | Status: ON HOLD | OUTPATIENT
Start: 2021-12-16 | End: 2022-01-12 | Stop reason: CLARIF

## 2021-12-16 RX ORDER — TRIAMCINOLONE ACETONIDE 1 MG/G
1 CREAM TOPICAL 2 TIMES DAILY
Qty: 80 G | Refills: 5 | Status: SHIPPED | OUTPATIENT
Start: 2021-12-16 | End: 2022-01-03 | Stop reason: SDUPTHER

## 2021-12-20 DIAGNOSIS — K22.10 EROSIVE ESOPHAGITIS: ICD-10-CM

## 2021-12-20 RX ORDER — PANTOPRAZOLE SODIUM 40 MG/1
40 TABLET, DELAYED RELEASE ORAL 2 TIMES DAILY
Qty: 180 TABLET | Refills: 3 | Status: ON HOLD | OUTPATIENT
Start: 2021-12-20 | End: 2022-01-12 | Stop reason: SDUPTHER

## 2022-01-03 ENCOUNTER — TELEPHONE (OUTPATIENT)
Dept: GASTROENTEROLOGY | Facility: CLINIC | Age: 62
End: 2022-01-03

## 2022-01-03 DIAGNOSIS — L23.9 DERMAL HYPERSENSITIVITY REACTION: ICD-10-CM

## 2022-01-03 RX ORDER — TRIAMCINOLONE ACETONIDE 1 MG/G
1 CREAM TOPICAL 2 TIMES DAILY
Qty: 160 G | Refills: 2 | Status: ON HOLD | OUTPATIENT
Start: 2022-01-03 | End: 2022-01-12 | Stop reason: CLARIF

## 2022-01-03 NOTE — TELEPHONE ENCOUNTER
PT LMOM C/O VOMITING 3-4 TIMES DAILY, THIS HAS BEEN ONGOING FOR THE LAST 3 WEEKS  LMOM FOR PT TO RETURN CALL TO DISCUSS

## 2022-01-06 ENCOUNTER — TELEMEDICINE (OUTPATIENT)
Dept: INTERNAL MEDICINE CLINIC | Facility: CLINIC | Age: 62
End: 2022-01-06
Payer: MEDICARE

## 2022-01-06 VITALS — WEIGHT: 170 LBS | BODY MASS INDEX: 30.12 KG/M2 | HEIGHT: 63 IN

## 2022-01-06 DIAGNOSIS — R11.2 NAUSEA AND VOMITING, INTRACTABILITY OF VOMITING NOT SPECIFIED, UNSPECIFIED VOMITING TYPE: Primary | ICD-10-CM

## 2022-01-06 PROCEDURE — 99213 OFFICE O/P EST LOW 20 MIN: CPT | Performed by: INTERNAL MEDICINE

## 2022-01-06 PROCEDURE — 3008F BODY MASS INDEX DOCD: CPT | Performed by: PODIATRIST

## 2022-01-06 NOTE — PATIENT INSTRUCTIONS
Problem List Items Addressed This Visit        Digestive    Nausea and vomiting - Primary     I recommend patient take the ondansetron  She reports it is not working  Discussed if having ongoing symptoms I recommend evaluation the emergency room as she may be having an upper GI bleed with digested blood that she is vomiting  Pt agrees to go to ED

## 2022-01-06 NOTE — PROGRESS NOTES
Virtual Regular Visit    Verification of patient location:    Patient is located in the following state in which I hold an active license [place]      Assessment/Plan:    Problem List Items Addressed This Visit        Digestive    Nausea and vomiting - Primary     I recommend patient take the ondansetron  She reports it is not working  Discussed if having ongoing symptoms I recommend evaluation the emergency room as she may be having an upper GI bleed with digested blood that she is vomiting  Pt agrees to go to ED  Reason for visit is   Chief Complaint   Patient presents with    Nausea     x3 weeks    Rash     all over body    Virtual Regular Visit        Encounter provider Garrett Negron MD    Provider located at [address] [place] [id number]      Recent Visits  No visits were found meeting these conditions  Showing recent visits within past 7 days and meeting all other requirements  Today's Visits  Date Type Provider Dept   01/06/22 Telemedicine Tye Mascorro today's visits and meeting all other requirements  Future Appointments  No visits were found meeting these conditions  Showing future appointments within next 150 days and meeting all other requirements       The patient was identified by name and date of birth  Tameka Oliver was informed that this is a telemedicine visit and that the visit is being conducted through Telephone  My office door was closed  No one else was in the room  She acknowledged consent and understanding of privacy and security of the video platform  The patient has agreed to participate and understands they can discontinue the visit at any time  Patient is aware this is a billable service  Subjective  Tameka Oliver is a 64 y o  female          Patient reports vomiting a large amount of black and brown vomitus this morning, no black tarry stool   She did not eat or drink anything black or brown before vomiting  She admits to having GERD symptoms  Patient denies taking a aspirin or ibuprofen recently  She does have a history of Schreiber's esophagus  She is taking the pantoprazole  She admits to fatigue       Past Medical History:   Diagnosis Date    Anxiety     Arthritis     Back pain at L4-L5 level     Schreiber esophagus     Bulimia     Colon polyp     Disease of thyroid gland     hypothyroid    Ear problems     GERD (gastroesophageal reflux disease)     History of transfusion     Hyperlipidemia     Hypothyroidism     Leukopenia     Nasal congestion     NMS (neuroleptic malignant syndrome) 01/03/2020    Pneumonia     Rheumatoid arthritis involving right hip (HCC)     Sciatica     Seizure ([facility] [address] )     due to medication mix up 8/2018 only one historically    Seizures ([facility] [address] )     Synovial cyst of lumbar spine     Vertigo     Weight gain        Past Surgical History:   Procedure Laterality Date    BONE MARROW BIOPSY      BREAST SURGERY      enlargement with implants    CLOSED REDUCTION DISTAL FEMUR FRACTURE      COLONOSCOPY      COSMETIC SURGERY      DENTAL SURGERY      HIP ARTHROPLASTY Right 1/2/2020    Procedure: REVISION TOTAL HIP ARTHROPLASTY WITH REPAIR OF PARIPROSTHETIC FRACTURE - POSTERIOR APPROACH;  Surgeon: Iis Purvis MD;  [facility];  Service: Orthopedics    TN ESOPHAGOGASTRODUODENOSCOPY TRANSORAL DIAGNOSTIC N/A 5/11/2021    Procedure: ESOPHAGOGASTRODUODENOSCOPY (EGD); Surgeon: Karen Gamino MD;  [facility];  Service: General    TN ESOPHAGUS SURG PROCEDURE UNLISTED N/A 5/11/2021    Procedure: FUNDOPLICATION TRANSORAL INCISIONLESS;  Surgeon: Abhi Amaya MD;  [facility];  Service: Gastroenterology    TN LAP, REPAIR PARAESOPHAGEAL HERNIA, INCL FUNDOPLASTY W/ MESH N/A 5/11/2021    Procedure: REPAIR HERNIA PARAESOPHAGEAL  LAPAROSCOPIC;  Surgeon:  Karen Gamino MD;  [facility];  Service: General    SD TOTAL HIP ARTHROPLASTY Right 12/11/2019    Procedure: ARTHROPLASTY HIP TOTAL ANTERIOR;  Surgeon: Shazia Elias MD;  [facility];  Service: Orthopedics    RHINOPLASTY      SINUS SURGERY      TRANSORAL INCISIONLESS FUNDOPLICATION (TIF)  66/89/2811       Current Outpatient Medications   Medication Sig Dispense Refill    acetaminophen (TYLENOL) 325 mg tablet Take 3 tablets (975 mg total) by mouth every 8 (eight) hours 30 tablet 0    aspirin (ECOTRIN LOW STRENGTH) 81 mg EC tablet Take 1 tablet (81 mg total) by mouth daily 30 tablet 0    atorvastatin (LIPITOR) 40 mg tablet Take 1 tablet (40 mg total) by mouth every evening 30 tablet 0    ferrous sulfate 325 (65 Fe) mg tablet Take 1 tablet (325 mg total) by mouth 2 (two) times a day with meals 60 tablet 1    ibuprofen (MOTRIN) 600 mg tablet       levothyroxine 25 mcg tablet Take 1 tablet (25 mcg total) by mouth daily 90 tablet 3    LORazepam (ATIVAN) 2 mg tablet Take 1 tablet (2 mg total) by mouth every 6 (six) hours as needed for anxiety 120 tablet 1    LORazepam (ATIVAN) 2 mg tablet TAKE 1 TABLET BY MOUTH EVERY 6 HOURS AS NEEDED FOR ANXIETY   120 tablet 1    meclizine (ANTIVERT) 25 mg tablet Take 1 tablet (25 mg total) by mouth 3 (three) times a day as needed for dizziness 30 tablet 0    Multiple Vitamin (MULTIVITAMIN) capsule Take 1 capsule by mouth daily 30 capsule 0    mupirocin (BACTROBAN) 2 % ointment Apply topically 2 (two) times a day 22 g 0    ondansetron (Zofran ODT) 4 mg disintegrating tablet Take 1 tablet (4 mg total) by mouth every 6 (six) hours as needed for nausea or vomiting 20 tablet 1    pantoprazole (PROTONIX) 40 mg tablet Take 1 tablet (40 mg total) by mouth 2 (two) times a day 180 tablet 3    PARoxetine (PAXIL) 30 mg tablet TAKE 2 TABLETS BY MOUTH DAILY 180 tablet 3    QUEtiapine (SEROquel) 400 MG tablet Take 1 tablet (400 mg total) by mouth daily at bedtime 90 tablet 3    triamcinolone (KENALOG) 0 1 % cream Apply 1 application topically 2 (two) times a day To affected area 160 g 2    ziprasidone (GEODON) 80 mg capsule TAKE 1 CAPSULE BY MOUTH EVERY DAY 90 capsule 3    aluminum-magnesium hydroxide-simethicone (MAALOX MAX) 400-400-40 MG/5ML suspension Take 10 mL by mouth every 6 (six) hours as needed for indigestion or heartburn (Patient not taking: Reported on 11/4/2021) 355 mL 0    Famotidine (PEPCID PO) Take by mouth 2 tablets daily (Patient not taking: Reported on 11/4/2021)       No current facility-administered medications for this visit  Allergies   Allergen Reactions    Risperdal [Risperidone] Shortness Of Breath     Rapid heart beat, SOB    Zyprexa [Olanzapine] Shortness Of Breath     Rapid heartbeat    Latex Rash     Band aids, adhesives wears normal underwear, can eat bananas  USE PAPER TAPE       Review of Systems   Constitutional: Positive for fatigue  Negative for chills and fever  HENT: Negative for rhinorrhea and sore throat  Respiratory: Negative for cough and shortness of breath  Cardiovascular: Negative for chest pain  Gastrointestinal: Positive for abdominal pain, nausea and vomiting  Negative for blood in stool and diarrhea  Neurological: Positive for light-headedness  Video Exam    Vitals:    01/06/22 0909   Weight: 77 1 kg (170 lb)   Height: 5' 3" (1 6 m)       Physical Exam  Pulmonary:      Effort: Pulmonary effort is normal  No respiratory distress  Neurological:      Mental Status: She is alert  It was my intent to perform this visit via video technology but the patient was not able to do a video connection so the visit was completed via audio telephone only  I spent 20 minutes with patient today in which greater than 50% of the time was spent in counseling/coordination of care regarding acute issue    VIRTUAL VISIT DISCLAIMER      [address] Nora Plascencia verbally agrees to participate in [employer]   Pt is aware that Virtual Care Services could be limited without vital signs or the ability to perform a full hands-on physical exam  Irene Plascencia understands she or the provider may request at any time to terminate the video visit and request the patient to seek care or treatment in person

## 2022-01-06 NOTE — ASSESSMENT & PLAN NOTE
I recommend patient take the ondansetron  She reports it is not working  Discussed if having ongoing symptoms I recommend evaluation the emergency room as she may be having an upper GI bleed with digested blood that she is vomiting  Pt agrees to go to ED

## 2022-01-10 ENCOUNTER — HOSPITAL ENCOUNTER (INPATIENT)
Facility: HOSPITAL | Age: 62
LOS: 2 days | Discharge: HOME | DRG: 378 | End: 2022-01-12
Attending: EMERGENCY MEDICINE | Admitting: HOSPITALIST
Payer: MEDICARE

## 2022-01-10 ENCOUNTER — APPOINTMENT (EMERGENCY)
Dept: RADIOLOGY | Facility: HOSPITAL | Age: 62
DRG: 378 | End: 2022-01-10
Payer: MEDICARE

## 2022-01-10 DIAGNOSIS — K22.10 EROSIVE ESOPHAGITIS: ICD-10-CM

## 2022-01-10 DIAGNOSIS — D64.9 ANEMIA, UNSPECIFIED TYPE: ICD-10-CM

## 2022-01-10 DIAGNOSIS — D64.9 ANEMIA: ICD-10-CM

## 2022-01-10 DIAGNOSIS — K92.2 UPPER GI BLEED: Primary | ICD-10-CM

## 2022-01-10 DIAGNOSIS — E87.6 HYPOKALEMIA: ICD-10-CM

## 2022-01-10 DIAGNOSIS — R11.2 NAUSEA & VOMITING: ICD-10-CM

## 2022-01-10 LAB
2HR DELTA HS TROPONIN: -1 NG/L
ABO GROUP BLD: NORMAL
ALBUMIN SERPL BCP-MCNC: 3.1 G/DL (ref 3.5–5)
ALP SERPL-CCNC: 81 U/L (ref 46–116)
ALT SERPL W P-5'-P-CCNC: 33 U/L (ref 12–78)
ANION GAP SERPL CALCULATED.3IONS-SCNC: 8 MMOL/L (ref 4–13)
AST SERPL W P-5'-P-CCNC: 35 U/L (ref 5–45)
ATRIAL RATE: 83 BPM
ATRIAL RATE: 85 BPM
BASOPHILS # BLD AUTO: 0.04 THOUSANDS/ÂΜL (ref 0–0.1)
BASOPHILS NFR BLD AUTO: 1 % (ref 0–1)
BILIRUB SERPL-MCNC: 0.41 MG/DL (ref 0.2–1)
BLD GP AB SCN SERPL QL: NEGATIVE
BUN SERPL-MCNC: 3 MG/DL (ref 5–25)
CALCIUM ALBUM COR SERPL-MCNC: 9.4 MG/DL (ref 8.3–10.1)
CALCIUM SERPL-MCNC: 8.7 MG/DL (ref 8.3–10.1)
CARDIAC TROPONIN I PNL SERPL HS: 3 NG/L
CARDIAC TROPONIN I PNL SERPL HS: 4 NG/L
CHLORIDE SERPL-SCNC: 94 MMOL/L (ref 100–108)
CO2 SERPL-SCNC: 28 MMOL/L (ref 21–32)
CREAT SERPL-MCNC: 0.71 MG/DL (ref 0.6–1.3)
EOSINOPHIL # BLD AUTO: 0.16 THOUSAND/ÂΜL (ref 0–0.61)
EOSINOPHIL NFR BLD AUTO: 5 % (ref 0–6)
ERYTHROCYTE [DISTWIDTH] IN BLOOD BY AUTOMATED COUNT: 17.4 % (ref 11.6–15.1)
GFR SERPL CREATININE-BSD FRML MDRD: 92 ML/MIN/1.73SQ M
GLUCOSE SERPL-MCNC: 91 MG/DL (ref 65–140)
HCT VFR BLD AUTO: 25.8 % (ref 34.8–46.1)
HCT VFR BLD AUTO: 28.8 % (ref 34.8–46.1)
HGB BLD-MCNC: 7.9 G/DL (ref 11.5–15.4)
HGB BLD-MCNC: 8.6 G/DL (ref 11.5–15.4)
IMM GRANULOCYTES # BLD AUTO: 0.01 THOUSAND/UL (ref 0–0.2)
IMM GRANULOCYTES NFR BLD AUTO: 0 % (ref 0–2)
INR PPP: 0.92 (ref 0.84–1.19)
LIPASE SERPL-CCNC: 55 U/L (ref 73–393)
LYMPHOCYTES # BLD AUTO: 0.53 THOUSANDS/ÂΜL (ref 0.6–4.47)
LYMPHOCYTES NFR BLD AUTO: 16 % (ref 14–44)
MCH RBC QN AUTO: 20.7 PG (ref 26.8–34.3)
MCHC RBC AUTO-ENTMCNC: 30.6 G/DL (ref 31.4–37.4)
MCV RBC AUTO: 68 FL (ref 82–98)
MONOCYTES # BLD AUTO: 0.3 THOUSAND/ÂΜL (ref 0.17–1.22)
MONOCYTES NFR BLD AUTO: 9 % (ref 4–12)
NEUTROPHILS # BLD AUTO: 2.31 THOUSANDS/ÂΜL (ref 1.85–7.62)
NEUTS SEG NFR BLD AUTO: 69 % (ref 43–75)
NRBC BLD AUTO-RTO: 0 /100 WBCS
P AXIS: 60 DEGREES
P AXIS: 66 DEGREES
PLATELET # BLD AUTO: 283 THOUSANDS/UL (ref 149–390)
PMV BLD AUTO: 8.4 FL (ref 8.9–12.7)
POTASSIUM SERPL-SCNC: 2.7 MMOL/L (ref 3.5–5.3)
PR INTERVAL: 138 MS
PR INTERVAL: 142 MS
PROT SERPL-MCNC: 6.6 G/DL (ref 6.4–8.2)
PROTHROMBIN TIME: 12 SECONDS (ref 11.6–14.5)
QRS AXIS: 25 DEGREES
QRS AXIS: 29 DEGREES
QRSD INTERVAL: 84 MS
QRSD INTERVAL: 86 MS
QT INTERVAL: 396 MS
QT INTERVAL: 406 MS
QTC INTERVAL: 465 MS
QTC INTERVAL: 483 MS
RBC # BLD AUTO: 3.81 MILLION/UL (ref 3.81–5.12)
RH BLD: POSITIVE
SODIUM SERPL-SCNC: 130 MMOL/L (ref 136–145)
SPECIMEN EXPIRATION DATE: NORMAL
T WAVE AXIS: 65 DEGREES
T WAVE AXIS: 70 DEGREES
VENTRICULAR RATE: 83 BPM
VENTRICULAR RATE: 85 BPM
WBC # BLD AUTO: 3.35 THOUSAND/UL (ref 4.31–10.16)

## 2022-01-10 PROCEDURE — 99285 EMERGENCY DEPT VISIT HI MDM: CPT | Performed by: EMERGENCY MEDICINE

## 2022-01-10 PROCEDURE — 93005 ELECTROCARDIOGRAM TRACING: CPT

## 2022-01-10 PROCEDURE — 84484 ASSAY OF TROPONIN QUANT: CPT

## 2022-01-10 PROCEDURE — 85025 COMPLETE CBC W/AUTO DIFF WBC: CPT

## 2022-01-10 PROCEDURE — 74177 CT ABD & PELVIS W/CONTRAST: CPT

## 2022-01-10 PROCEDURE — C9113 INJ PANTOPRAZOLE SODIUM, VIA: HCPCS | Performed by: HOSPITALIST

## 2022-01-10 PROCEDURE — 93010 ELECTROCARDIOGRAM REPORT: CPT | Performed by: INTERNAL MEDICINE

## 2022-01-10 PROCEDURE — 76775 US EXAM ABDO BACK WALL LIM: CPT | Performed by: EMERGENCY MEDICINE

## 2022-01-10 PROCEDURE — 86850 RBC ANTIBODY SCREEN: CPT

## 2022-01-10 PROCEDURE — 83690 ASSAY OF LIPASE: CPT

## 2022-01-10 PROCEDURE — 99223 1ST HOSP IP/OBS HIGH 75: CPT | Performed by: HOSPITALIST

## 2022-01-10 PROCEDURE — 85610 PROTHROMBIN TIME: CPT

## 2022-01-10 PROCEDURE — 85018 HEMOGLOBIN: CPT | Performed by: HOSPITALIST

## 2022-01-10 PROCEDURE — 99285 EMERGENCY DEPT VISIT HI MDM: CPT

## 2022-01-10 PROCEDURE — G1004 CDSM NDSC: HCPCS

## 2022-01-10 PROCEDURE — 86901 BLOOD TYPING SEROLOGIC RH(D): CPT

## 2022-01-10 PROCEDURE — 36415 COLL VENOUS BLD VENIPUNCTURE: CPT

## 2022-01-10 PROCEDURE — 86900 BLOOD TYPING SEROLOGIC ABO: CPT

## 2022-01-10 PROCEDURE — 76705 ECHO EXAM OF ABDOMEN: CPT | Performed by: EMERGENCY MEDICINE

## 2022-01-10 PROCEDURE — 85014 HEMATOCRIT: CPT | Performed by: HOSPITALIST

## 2022-01-10 PROCEDURE — 80053 COMPREHEN METABOLIC PANEL: CPT

## 2022-01-10 PROCEDURE — 96365 THER/PROPH/DIAG IV INF INIT: CPT

## 2022-01-10 PROCEDURE — 99223 1ST HOSP IP/OBS HIGH 75: CPT | Performed by: INTERNAL MEDICINE

## 2022-01-10 RX ORDER — POTASSIUM CHLORIDE 20 MEQ/1
40 TABLET, EXTENDED RELEASE ORAL ONCE
Status: COMPLETED | OUTPATIENT
Start: 2022-01-10 | End: 2022-01-10

## 2022-01-10 RX ORDER — POTASSIUM CHLORIDE 14.9 MG/ML
20 INJECTION INTRAVENOUS ONCE
Status: COMPLETED | OUTPATIENT
Start: 2022-01-10 | End: 2022-01-10

## 2022-01-10 RX ORDER — ACETAMINOPHEN 325 MG/1
975 TABLET ORAL EVERY 8 HOURS SCHEDULED
Status: DISCONTINUED | OUTPATIENT
Start: 2022-01-10 | End: 2022-01-12 | Stop reason: HOSPADM

## 2022-01-10 RX ORDER — LEVOTHYROXINE SODIUM 0.03 MG/1
25 TABLET ORAL
Status: DISCONTINUED | OUTPATIENT
Start: 2022-01-11 | End: 2022-01-12 | Stop reason: HOSPADM

## 2022-01-10 RX ORDER — ATORVASTATIN CALCIUM 40 MG/1
40 TABLET, FILM COATED ORAL EVERY EVENING
Status: DISCONTINUED | OUTPATIENT
Start: 2022-01-10 | End: 2022-01-12 | Stop reason: HOSPADM

## 2022-01-10 RX ORDER — ZIPRASIDONE HYDROCHLORIDE 40 MG/1
80 CAPSULE ORAL DAILY
Status: DISCONTINUED | OUTPATIENT
Start: 2022-01-11 | End: 2022-01-12 | Stop reason: HOSPADM

## 2022-01-10 RX ORDER — FERROUS SULFATE 325(65) MG
325 TABLET ORAL 2 TIMES DAILY WITH MEALS
Status: DISCONTINUED | OUTPATIENT
Start: 2022-01-10 | End: 2022-01-12 | Stop reason: HOSPADM

## 2022-01-10 RX ORDER — QUETIAPINE FUMARATE 100 MG/1
400 TABLET, FILM COATED ORAL
Status: DISCONTINUED | OUTPATIENT
Start: 2022-01-10 | End: 2022-01-12 | Stop reason: HOSPADM

## 2022-01-10 RX ORDER — TRIAMCINOLONE ACETONIDE 1 MG/G
1 CREAM TOPICAL 2 TIMES DAILY
Status: DISCONTINUED | OUTPATIENT
Start: 2022-01-10 | End: 2022-01-10

## 2022-01-10 RX ORDER — PAROXETINE HYDROCHLORIDE 20 MG/1
60 TABLET, FILM COATED ORAL DAILY
Status: DISCONTINUED | OUTPATIENT
Start: 2022-01-11 | End: 2022-01-12 | Stop reason: HOSPADM

## 2022-01-10 RX ORDER — PANTOPRAZOLE SODIUM 40 MG/10ML
40 INJECTION, POWDER, LYOPHILIZED, FOR SOLUTION INTRAVENOUS EVERY 12 HOURS SCHEDULED
Status: DISCONTINUED | OUTPATIENT
Start: 2022-01-10 | End: 2022-01-12 | Stop reason: HOSPADM

## 2022-01-10 RX ORDER — ONDANSETRON 2 MG/ML
4 INJECTION INTRAMUSCULAR; INTRAVENOUS EVERY 6 HOURS PRN
Status: DISCONTINUED | OUTPATIENT
Start: 2022-01-10 | End: 2022-01-12 | Stop reason: HOSPADM

## 2022-01-10 RX ORDER — LORAZEPAM 0.5 MG/1
2 TABLET ORAL EVERY 6 HOURS PRN
Status: DISCONTINUED | OUTPATIENT
Start: 2022-01-10 | End: 2022-01-12 | Stop reason: HOSPADM

## 2022-01-10 RX ORDER — DIPHENHYDRAMINE HYDROCHLORIDE, ZINC ACETATE 2; .1 G/100G; G/100G
CREAM TOPICAL 3 TIMES DAILY
Status: DISCONTINUED | OUTPATIENT
Start: 2022-01-10 | End: 2022-01-12 | Stop reason: HOSPADM

## 2022-01-10 RX ORDER — SODIUM CHLORIDE, SODIUM GLUCONATE, SODIUM ACETATE, POTASSIUM CHLORIDE, MAGNESIUM CHLORIDE, SODIUM PHOSPHATE, DIBASIC, AND POTASSIUM PHOSPHATE .53; .5; .37; .037; .03; .012; .00082 G/100ML; G/100ML; G/100ML; G/100ML; G/100ML; G/100ML; G/100ML
50 INJECTION, SOLUTION INTRAVENOUS CONTINUOUS
Status: DISCONTINUED | OUTPATIENT
Start: 2022-01-10 | End: 2022-01-12

## 2022-01-10 RX ADMIN — POTASSIUM CHLORIDE 40 MEQ: 1500 TABLET, EXTENDED RELEASE ORAL at 11:04

## 2022-01-10 RX ADMIN — QUETIAPINE FUMARATE 400 MG: 100 TABLET ORAL at 21:45

## 2022-01-10 RX ADMIN — FERROUS SULFATE TAB 325 MG (65 MG ELEMENTAL FE) 325 MG: 325 (65 FE) TAB at 17:37

## 2022-01-10 RX ADMIN — IOHEXOL 100 ML: 350 INJECTION, SOLUTION INTRAVENOUS at 10:50

## 2022-01-10 RX ADMIN — PANTOPRAZOLE SODIUM 40 MG: 40 INJECTION, POWDER, FOR SOLUTION INTRAVENOUS at 21:45

## 2022-01-10 RX ADMIN — ACETAMINOPHEN 975 MG: 325 TABLET, FILM COATED ORAL at 13:30

## 2022-01-10 RX ADMIN — ACETAMINOPHEN 975 MG: 325 TABLET, FILM COATED ORAL at 21:45

## 2022-01-10 RX ADMIN — NYSTATIN, TRIAMCINOLONE ACETONIDE: 100000; 1 CREAM TOPICAL at 21:44

## 2022-01-10 RX ADMIN — POTASSIUM CHLORIDE 20 MEQ: 14.9 INJECTION, SOLUTION INTRAVENOUS at 11:04

## 2022-01-10 RX ADMIN — DIPHENHYDRAMINE HYDROCHLORIDE, ZINC ACETATE: 2; .1 CREAM TOPICAL at 21:44

## 2022-01-10 RX ADMIN — PANTOPRAZOLE SODIUM 40 MG: 40 INJECTION, POWDER, FOR SOLUTION INTRAVENOUS at 13:27

## 2022-01-10 RX ADMIN — ATORVASTATIN CALCIUM 40 MG: 40 TABLET, FILM COATED ORAL at 17:37

## 2022-01-10 RX ADMIN — POTASSIUM CHLORIDE 40 MEQ: 1500 TABLET, EXTENDED RELEASE ORAL at 17:38

## 2022-01-10 RX ADMIN — SODIUM CHLORIDE, SODIUM GLUCONATE, SODIUM ACETATE, POTASSIUM CHLORIDE, MAGNESIUM CHLORIDE, SODIUM PHOSPHATE, DIBASIC, AND POTASSIUM PHOSPHATE 50 ML/HR: .53; .5; .37; .037; .03; .012; .00082 INJECTION, SOLUTION INTRAVENOUS at 13:26

## 2022-01-10 NOTE — ED PROCEDURE NOTE
Procedure  POC Renal US    Date/Time: 1/10/2022 11:34 AM  Performed by: Aracelis Jasso DO  Authorized by: Aracelis Jasso DO     Patient location:  ED  Performed by:  Resident  Procedure details:     Exam Type:  Diagnostic    Indications: abdominal pain      Assessment for:  Bladder volume    Views obtained: bladder (transverse and sagittal), left kidney and right kidney      Image quality: diagnostic      Image availability:  Images available in PACS  Findings:     LEFT hydronephrosis: none      RIGHT hydronephrosis: mild (grade 1)      Bladder:  Visualized    Bladder findings: distended bladder                       Aracelis Jasso DO  01/10/22 1135

## 2022-01-10 NOTE — WOUND OSTOMY CARE
Consult Note - Wound   Antonio Cunningham 64 y o  female MRN: [id number]  Unit/Bed#: [facility] 924-01 Encounter: [id number]      History and Present Illness:  Patient is a 64year old female admitted with upper GI bleed and present on admission skin integrity issues  She is awake, alert, oriented and agreeable for wound care nurse to assess skin, she is continent and ambulatory with low risk for PI development  BMI: Estimated body mass index is 30 11 kg/m² as calculated from the following:    Height as of 1/6/22: 5' 3" (1 6 m)  Weight as of 1/6/22: 77 1 kg (170 lb)  History  Past Medical History:   Diagnosis Date    Anxiety     Arthritis     Back pain at L4-L5 level     Schreiber esophagus     Bulimia     Colon polyp     Disease of thyroid gland     hypothyroid    Ear problems     GERD (gastroesophageal reflux disease)     History of transfusion     Hyperlipidemia     Hypothyroidism     Leukopenia     Nasal congestion     NMS (neuroleptic malignant syndrome) 01/03/2020    Pneumonia     Rheumatoid arthritis involving right hip (HCC)     Sciatica     Seizure ([address]     due to medication mix up 8/2018 only one historically    Seizures ([address] )     Synovial cyst of lumbar spine     Vertigo     Weight gain      Past Surgical History:   Procedure Laterality Date    BONE MARROW BIOPSY      BREAST SURGERY      enlargement with implants    CLOSED REDUCTION DISTAL FEMUR FRACTURE      COLONOSCOPY      COSMETIC SURGERY      DENTAL SURGERY      HIP ARTHROPLASTY Right 1/2/2020    Procedure: REVISION TOTAL HIP ARTHROPLASTY WITH REPAIR OF PARIPROSTHETIC FRACTURE - POSTERIOR APPROACH;  Surgeon: Lilia Childers MD;  [facility];  Service: Orthopedics    [place] ESOPHAGOGASTRODUODENOSCOPY TRANSORAL DIAGNOSTIC N/A 5/11/2021    Procedure: ESOPHAGOGASTRODUODENOSCOPY (EGD); Surgeon:  Lázaro Palmer MD;  [facility];  Service: General    WA ESOPHAGUS SURG PROCEDURE UNLISTED N/A 5/11/2021 Procedure: FUNDOPLICATION TRANSORAL INCISIONLESS;  Surgeon: Juan F Pete MD;  [facility];  Service: Gastroenterology    SC LAP, REPAIR PARAESOPHAGEAL HERNIA, INCL FUNDOPLASTY W/ MESH N/A 5/11/2021    Procedure: REPAIR HERNIA PARAESOPHAGEAL  LAPAROSCOPIC;  Surgeon:  Dayanara Coughlin MD;  [facility];  Service: General    SC TOTAL HIP ARTHROPLASTY Right 12/11/2019    Procedure: ARTHROPLASTY HIP TOTAL ANTERIOR;  Surgeon: Bro Pearl MD;  [facility];  Service: Orthopedics    RHINOPLASTY      SINUS SURGERY      TRANSORAL INCISIONLESS FUNDOPLICATION (TIF)  60/92/3623       Problem List:   Patient Active Problem List   Diagnosis    Lumbar radiculopathy    DDD (degenerative disc disease), lumbar    Spondylolisthesis at L4-L5 level    Localized osteoporosis with current pathological fracture with routine healing    Spinal stenosis of lumbar region with neurogenic claudication    Lumbar spondylosis    T12 compression fracture (HCC)    Synovial cyst of lumbar facet joint    Anxiety    Bulimia nervosa in remission    Constipation    Acquired hypothyroidism    Other fatigue    Serotonin syndrome    Schizoaffective disorder (HCC)    ABIMAEL (generalized anxiety disorder)    Dermal hypersensitivity reaction    Elevated BP without diagnosis of hypertension    Essential hypertension    Right hip pain    Chronic bilateral low back pain without sciatica    Iron deficiency anemia due to chronic blood loss    NMS (neuroleptic malignant syndrome)    Hypokalemia    Fall at home    Gastroesophageal reflux disease with esophagitis without hemorrhage    Hyponatremia    Problem related to living arrangement    Dizziness    Medicare annual wellness visit, subsequent    Fever    Acute non-recurrent maxillary sinusitis    Symptomatic anemia    Multiple excoriations    Rash    Cellulitis of left upper extremity    Erosive esophagitis    Melena    Hiatal hernia with gastroesophageal reflux disease and esophagitis    Polyp of colon    Word finding difficulty    Hyperlipidemia    Nausea and vomiting    S/P tooth extraction    Upper GI bleed       Medications:  Current Facility-Administered Medications   Medication Dose Route Frequency Provider Last Rate Last Admin    acetaminophen (TYLENOL) tablet 975 mg  975 mg Oral Q8H [address] Shen Turner MD   975 mg at 01/10/22 1330    atorvastatin (LIPITOR) tablet 40 mg  40 mg Oral QPM Gracie Koenig MD   40 mg at 01/10/22 1737    diphenhydrAMINE-zinc acetate (BENADRYL) 2-0 1 % cream   Topical TID Gracie Koenig MD        ferrous sulfate tablet 325 mg  325 mg Oral BID With Meals Gracie Koenig MD   325 mg at 01/10/22 1737    [START ON 1/11/2022] levothyroxine tablet 25 mcg  25 mcg Oral Early Morning Keren Azul MD        LORazepam (ATIVAN) tablet 2 mg  2 mg Oral Q6H PRN Gracie Koenig MD        multi-electrolyte (PLASMALYTE-A/ISOLYTE-S PH 7 4) IV solution  50 mL/hr Intravenous Continuous Gracie Koenig MD 50 mL/hr at 01/10/22 1326 50 mL/hr at 01/10/22 1326    nystatin-triamcinolone (MYCOLOG-II) cream   Topical BID Keren Azul MD        ondansetron (ZOFRAN) injection 4 mg  4 mg Intravenous Q6H PRN Gracie Koenig MD        pantoprazole (PROTONIX) injection 40 mg  40 mg Intravenous Q12H [address] Keren Casey MD   40 mg at 01/10/22 1327    [START ON 1/11/2022] PARoxetine (PAXIL) tablet 60 mg  60 mg Oral Daily Keren Azul MD        QUEtiapine (SEROquel) tablet 400 mg  400 mg Oral HS Gracie Koenig MD        [START ON 1/11/2022] ziprasidone (GEODON) capsule 80 mg  80 mg Oral Daily Gracie Koenig MD           Assessment Findings:   1-Moderate pruritic rash to bilateral LE, worsen by patient scratching of skin  Areas appears to be scabbing, no drainage noted  2-Neck and back with scant rashy patches, per patient also itching a lot        Patient attributes her skin issues to allergy, however when asked to elaborate on factors contributing to her allergy, patient cannot elaborate, simply states its allergies and have gotten worse since her PCP decrease amount of kenalog ordered for her  Per patient last week instead to two tubes her PCP only ordered one tube hence her skin is at this state  Skin care plans:  1-Hydraguard to bilateral sacrum, buttock and heels TID and PRN  2-Elevate heels to offload pressure  3-Ehob cushion in chair when out of bed  4-Moisturize skin daily with skin nourishing cream   5-Turn/reposition q2h or when medically stable for pressure re-distribution on skin  Patient already has  nystatin-triamcinolone continue with current med as order  Vitals: Blood pressure 112/73, pulse 83, temperature 99 1 °F (37 3 °C), resp  rate 18, SpO2 97 %, not currently breastfeeding  ,There is no height or weight on file to calculate BMI  Our skin care recommendations are placed as nursing orders, wound care is signing off, please re-consult or tiger text with questions and concerns        Fina Smallwood, RN, BSN, [employer] [facility] [employer]

## 2022-01-10 NOTE — ASSESSMENT & PLAN NOTE
· Suspect hypovolemic hyponatremia  · H/o same in past also  · ?  Chronic component of SIADH, on SSRI  · For now will give slow IVF, BMP in am

## 2022-01-10 NOTE — ED NOTES
Two attempts to obtain IV access in [facility] unsuccessful  Second RN to attempt  Pt comfortable  Watching television, call bell in hand        Amaya Zaman, [address] [facility]  01/10/22 2921

## 2022-01-10 NOTE — ED PROVIDER NOTES
History  Chief Complaint   Patient presents with    Vomiting     Pt reports coffee ground emesis x3 weeks, vomiting approx 3 times a day; referred to ED by family doc; pt also reports itchy rash all over body for multiple months that pt states is not going away    Rash     43-year-old female patient with history Schreiber's esophagus, schizoaffective, HLD, hypothyroid presenting with vomiting x3 weeks with coffee-ground emesis x1 week in past week  Patient states that for the past 3 weeks, she has been vomiting 3 times daily  Patient has not been taking any medications for this  States nothing triggers this and has been able to eat and drink normally  Patient states within the last week she has been having worsening vomiting with coffee-ground emesis  Denies diarrhea, blood in stool, constipation  Patient also is complaining of rash x2 months  Patient has excoriations all over her body  Patient states it is itchy and has been using Kenalog cream for it  Patient had recent endoscopy 3 months ago that showed Schreiber's esophagus  Patient has a history of hernia repair and transoral incision less fundoplication  Patient states lightheadedness but denies any syncope  Denies chest pain, shortness of breath, fever, urinary symptoms  Prior to Admission Medications   Prescriptions Last Dose Informant Patient Reported? Taking? Famotidine (PEPCID PO) 1/10/2022 at Unknown time  Yes Yes   Sig: Take by mouth 2 tablets daily     LORazepam (ATIVAN) 2 mg tablet 1/10/2022 at Unknown time  No Yes   Sig: Take 1 tablet (2 mg total) by mouth every 6 (six) hours as needed for anxiety   LORazepam (ATIVAN) 2 mg tablet   No No   Sig: TAKE 1 TABLET BY MOUTH EVERY 6 HOURS AS NEEDED FOR ANXIETY     Multiple Vitamin (MULTIVITAMIN) capsule Not Taking at Unknown time Self No No   Sig: Take 1 capsule by mouth daily   Patient not taking: Reported on 1/10/2022    PARoxetine (PAXIL) 30 mg tablet 1/11/2022 at Unknown time  No Yes Sig: TAKE 2 TABLETS BY MOUTH DAILY   QUEtiapine (SEROquel) 400 MG tablet 1/9/2022 at Unknown time  No Yes   Sig: Take 1 tablet (400 mg total) by mouth daily at bedtime   acetaminophen (TYLENOL) 325 mg tablet Unknown at Unknown time Self No No   Sig: Take 3 tablets (975 mg total) by mouth every 8 (eight) hours   aluminum-magnesium hydroxide-simethicone (MAALOX MAX) 400-400-40 MG/5ML suspension Not Taking at Unknown time  No No   Sig: Take 10 mL by mouth every 6 (six) hours as needed for indigestion or heartburn   Patient not taking: Reported on 11/4/2021   aspirin (ECOTRIN LOW STRENGTH) 81 mg EC tablet Not Taking at Unknown time Self No No   Sig: Take 1 tablet (81 mg total) by mouth daily   Patient not taking: Reported on 1/10/2022    atorvastatin (LIPITOR) 40 mg tablet 1/9/2022 at Unknown time Self No Yes   Sig: Take 1 tablet (40 mg total) by mouth every evening   ferrous sulfate 325 (65 Fe) mg tablet 1/10/2022 at Unknown time Self No Yes   Sig: Take 1 tablet (325 mg total) by mouth 2 (two) times a day with meals   ibuprofen (MOTRIN) 600 mg tablet Not Taking at Unknown time  Yes No   Patient not taking: Reported on 1/10/2022    levothyroxine 25 mcg tablet 1/10/2022 at Unknown time Self No Yes   Sig: Take 1 tablet (25 mcg total) by mouth daily   meclizine (ANTIVERT) 25 mg tablet Not Taking at Unknown time Self No No   Sig: Take 1 tablet (25 mg total) by mouth 3 (three) times a day as needed for dizziness   Patient not taking: Reported on 1/10/2022    mupirocin (BACTROBAN) 2 % ointment Not Taking at Unknown time  No No   Sig: Apply topically 2 (two) times a day   Patient not taking: Reported on 1/10/2022    ondansetron (Zofran ODT) 4 mg disintegrating tablet Not Taking at Unknown time  No No   Sig: Take 1 tablet (4 mg total) by mouth every 6 (six) hours as needed for nausea or vomiting   Patient not taking: Reported on 1/10/2022    pantoprazole (PROTONIX) 40 mg tablet 1/11/2022 at Unknown time  No Yes   Sig: Take 1 tablet (40 mg total) by mouth 2 (two) times a day   triamcinolone (KENALOG) 0 1 % cream Not Taking at Unknown time  No No   Sig: Apply 1 application topically 2 (two) times a day To affected area   Patient not taking: Reported on 1/10/2022    ziprasidone (GEODON) 80 mg capsule 1/11/2022 at Unknown time  No Yes   Sig: TAKE 1 CAPSULE BY MOUTH EVERY DAY      Facility-Administered Medications: None       Past Medical History:   Diagnosis Date    Anxiety     Arthritis     Back pain at L4-L5 level     Schreiber esophagus     Bulimia     Colon polyp     Disease of thyroid gland     hypothyroid    Ear problems     GERD (gastroesophageal reflux disease)     History of transfusion     Hyperlipidemia     Hypothyroidism     Leukopenia     Nasal congestion     NMS (neuroleptic malignant syndrome) 01/03/2020    Pneumonia     Rheumatoid arthritis involving right hip (HCC)     Sciatica     Seizure ([address] )     due to medication mix up 8/2018 only one historically    Seizures ([address] )     Synovial cyst of lumbar spine     Vertigo     Weight gain        Past Surgical History:   Procedure Laterality Date    BONE MARROW BIOPSY      BREAST SURGERY      enlargement with implants    CLOSED REDUCTION DISTAL FEMUR FRACTURE      COLONOSCOPY      COSMETIC SURGERY      DENTAL SURGERY      HIP ARTHROPLASTY Right 1/2/2020    Procedure: REVISION TOTAL HIP ARTHROPLASTY WITH REPAIR OF PARIPROSTHETIC FRACTURE - POSTERIOR APPROACH;  Surgeon: Keyon Duque MD;  [facility];  Service: Orthopedics    MS ESOPHAGOGASTRODUODENOSCOPY TRANSORAL DIAGNOSTIC N/A 5/11/2021    Procedure: ESOPHAGOGASTRODUODENOSCOPY (EGD); Surgeon:  Casper Homans, MD;  [facility];  Service: General    MS ESOPHAGUS SURG PROCEDURE UNLISTED N/A 5/11/2021    Procedure: FUNDOPLICATION TRANSORAL INCISIONLESS;  Surgeon: Ena Jacobson MD;  [facility];  Service: Gastroenterology    MS LAP, REPAIR PARAESOPHAGEAL HERNIA, INCL FUNDOPLASTY W/ MESH N/A 5/11/2021    Procedure: REPAIR HERNIA PARAESOPHAGEAL  LAPAROSCOPIC;  Surgeon: Cain Sykes MD;  [facility];  Service: General    TX TOTAL HIP ARTHROPLASTY Right 12/11/2019    Procedure: ARTHROPLASTY HIP TOTAL ANTERIOR;  Surgeon: Vince Benítez MD;  [facility];  Service: Orthopedics    RHINOPLASTY      SINUS SURGERY      TRANSORAL INCISIONLESS FUNDOPLICATION (TIF)  42/41/2686       Family History   Problem Relation Age of Onset    Uterine cancer Mother     Esophageal cancer Father     Colon cancer Maternal Grandmother      I have reviewed and agree with the history as documented  E-Cigarette/Vaping    E-Cigarette Use Never User      E-Cigarette/Vaping Substances    Nicotine No     THC No     CBD No     Flavoring No     Other No     Unknown No      Social History     Tobacco Use    Smoking status: Never Smoker    Smokeless tobacco: Never Used   Vaping Use    Vaping Use: Never used   Substance Use Topics    Alcohol use: Not Currently    Drug use: Not Currently        Review of Systems   Constitutional: Negative for chills, fatigue and fever  HENT: Negative for congestion, rhinorrhea and sore throat  Eyes: Negative for pain and visual disturbance  Respiratory: Negative for cough, chest tightness and shortness of breath  Cardiovascular: Negative for chest pain and leg swelling  Gastrointestinal: Positive for nausea and vomiting  Negative for abdominal distention, abdominal pain and diarrhea  Genitourinary: Negative for difficulty urinating and dysuria  Musculoskeletal: Negative for arthralgias, back pain and myalgias  Skin: Positive for rash  Negative for wound  Neurological: Positive for light-headedness  Negative for dizziness, weakness and headaches  All other systems reviewed and are negative        Physical Exam  ED Triage Vitals   Temperature Pulse Respirations Blood Pressure SpO2   01/10/22 0725 01/10/22 0725 01/10/22 0725 01/10/22 0725 01/10/22 0725   98 4 °F (36 9 °C) 105 18 (!) 175/84 99 %      Temp Source Heart Rate Source Patient Position - Orthostatic VS BP Location FiO2 (%)   01/10/22 0725 01/10/22 0725 01/10/22 1100 01/10/22 1100 --   Oral Monitor Lying Right arm       Pain Score       01/10/22 1100       No Pain             Orthostatic Vital Signs  Vitals:    01/11/22 0728 01/11/22 1035 01/11/22 1128 01/11/22 1143   BP: 119/74 158/85 122/70 130/72   Pulse: 102 90 92 91   Patient Position - Orthostatic VS:           Physical Exam  Vitals reviewed  Constitutional:       Appearance: Normal appearance  HENT:      Head: Normocephalic and atraumatic  Nose: Nose normal       Mouth/Throat:      Mouth: Mucous membranes are moist       Pharynx: Oropharynx is clear  Eyes:      Extraocular Movements: Extraocular movements intact  Conjunctiva/sclera: Conjunctivae normal    Cardiovascular:      Rate and Rhythm: Regular rhythm  Tachycardia present  Pulses: Normal pulses  Heart sounds: Normal heart sounds  Pulmonary:      Effort: Pulmonary effort is normal       Breath sounds: Normal breath sounds  Abdominal:      General: Bowel sounds are normal       Palpations: Abdomen is soft  Tenderness: There is abdominal tenderness (Mild Epigastric tenderness)  Musculoskeletal:         General: Normal range of motion  Cervical back: Normal range of motion  Skin:     General: Skin is warm and dry  Findings: Rash (excoriations with scratch marks on BLE and isolated excoriations on abdomen, chest and back) present  Neurological:      General: No focal deficit present  Mental Status: She is alert and oriented to person, place, and time  Mental status is at baseline           ED Medications  Medications   pantoprazole (PROTONIX) injection 40 mg (40 mg Intravenous Given 1/11/22 0910)   acetaminophen (TYLENOL) tablet 975 mg (975 mg Oral Given 1/11/22 0520)   atorvastatin (LIPITOR) tablet 40 mg (40 mg Oral Given 1/10/22 1737)   ferrous sulfate tablet 325 mg (325 mg Oral Not Given 1/11/22 0730)   levothyroxine tablet 25 mcg (25 mcg Oral Given 1/11/22 0520)   LORazepam (ATIVAN) tablet 2 mg (has no administration in time range)   PARoxetine (PAXIL) tablet 60 mg (60 mg Oral Given 1/11/22 0909)   QUEtiapine (SEROquel) tablet 400 mg (400 mg Oral Given 1/10/22 2145)   ziprasidone (GEODON) capsule 80 mg (80 mg Oral Given 1/11/22 0910)   ondansetron (ZOFRAN) injection 4 mg (has no administration in time range)   diphenhydrAMINE-zinc acetate (BENADRYL) 2-0 1 % cream ( Topical Refused 1/11/22 0907)   multi-electrolyte (PLASMALYTE-A/ISOLYTE-S PH 7 4) IV solution (50 mL/hr Intravenous New Bag 1/11/22 1229)   triamcinolone (KENALOG) 0 1 % cream (has no administration in time range)   potassium chloride (K-DUR,KLOR-CON) CR tablet 40 mEq (40 mEq Oral Given 1/10/22 1104)   potassium chloride 20 mEq IVPB (premix) (20 mEq Intravenous New Bag 1/10/22 1104)   iohexol (OMNIPAQUE) 350 MG/ML injection (SINGLE-DOSE) 100 mL (100 mL Intravenous Given 1/10/22 1050)   potassium chloride (K-DUR,KLOR-CON) CR tablet 40 mEq (40 mEq Oral Given 1/10/22 1738)       Diagnostic Studies  Results Reviewed     Procedure Component Value Units Date/Time    Hemoglobin and hematocrit, blood [[id number]]  (Abnormal) Collected: 01/11/22 1324    Lab Status: Final result Specimen: Blood from Arm, Left Updated: 01/11/22 1333     Hemoglobin 8 6 g/dL      Hematocrit 29 2 %     Basic metabolic panel [[id number]]  (Abnormal) Collected: 01/11/22 0512    Lab Status: Final result Specimen: Blood from Arm, Left Updated: 01/11/22 0727     Sodium 135 mmol/L      Potassium 3 7 mmol/L      Chloride 101 mmol/L      CO2 28 mmol/L      ANION GAP 6 mmol/L      BUN 3 mg/dL      Creatinine 0 67 mg/dL      Glucose 79 mg/dL      Calcium 8 3 mg/dL      eGFR 95 ml/min/1 73sq m     Narrative:      [place] guidelines for Chronic Kidney Disease (CKD):     Stage 1 with normal or high GFR (GFR > 90 mL/min/1 73 square meters)    Stage 2 Mild CKD (GFR = 60-89 mL/min/1 73 square meters)    Stage 3A Moderate CKD (GFR = 45-59 mL/min/1 73 square meters)    Stage 3B Moderate CKD (GFR = 30-44 mL/min/1 73 square meters)    Stage 4 Severe CKD (GFR = 15-29 mL/min/1 73 square meters)    Stage 5 End Stage CKD (GFR <15 mL/min/1 73 square meters)  Note: GFR calculation is accurate only with a steady state creatinine    Hemoglobin and hematocrit, blood [[id number]]  (Abnormal) Collected: 01/11/22 0512    Lab Status: Final result Specimen: Blood from Arm, Left Updated: 01/11/22 0703     Hemoglobin 8 0 g/dL      Hematocrit 27 3 %     Hemoglobin and hematocrit, blood [[id number]]  (Abnormal) Collected: 01/10/22 2200    Lab Status: Final result Specimen: Blood from Arm, Right Updated: 01/10/22 2208     Hemoglobin 8 6 g/dL      Hematocrit 28 8 %     Hemoglobin and hematocrit, blood [[id number]]     Lab Status: No result Specimen: Blood     HS Troponin I 2hr [[id number]] Collected: 01/10/22 1133    Lab Status: Final result Specimen: Blood from Hand, Right Updated: 01/10/22 1225     hs TnI 2hr 3 ng/L      Delta 2hr hsTnI -1 ng/L     Comprehensive metabolic panel [[id number]]  (Abnormal) Collected: 01/10/22 0844    Lab Status: Final result Specimen: Blood from Hand, Right Updated: 01/10/22 1014     Sodium 130 mmol/L      Potassium 2 7 mmol/L      Chloride 94 mmol/L      CO2 28 mmol/L      ANION GAP 8 mmol/L      BUN 3 mg/dL      Creatinine 0 71 mg/dL      Glucose 91 mg/dL      Calcium 8 7 mg/dL      Corrected Calcium 9 4 mg/dL      AST 35 U/L      ALT 33 U/L      Alkaline Phosphatase 81 U/L      Total Protein 6 6 g/dL      Albumin 3 1 g/dL      Total Bilirubin 0 41 mg/dL      eGFR 92 ml/min/1 73sq m     Narrative:      [place] guidelines for Chronic Kidney Disease (CKD):     Stage 1 with normal or high GFR (GFR > 90 mL/min/1 73 square meters)    Stage 2 Mild CKD (GFR = 60-89 mL/min/1 73 square meters)    Stage 3A Moderate CKD (GFR = 45-59 mL/min/1 73 square meters)    Stage 3B Moderate CKD (GFR = 30-44 mL/min/1 73 square meters)    Stage 4 Severe CKD (GFR = 15-29 mL/min/1 73 square meters)    Stage 5 End Stage CKD (GFR <15 mL/min/1 73 square meters)  Note: GFR calculation is accurate only with a steady state creatinine    Lipase [[id number]]  (Abnormal) Collected: 01/10/22 0844    Lab Status: Final result Specimen: Blood from Hand, Right Updated: 01/10/22 1002     Lipase 55 u/L     HS Troponin 0hr (reflex protocol) [[id number]]  (Normal) Collected: 01/10/22 0844    Lab Status: Final result Specimen: Blood from Hand, Right Updated: 01/10/22 0925     hs TnI 0hr 4 ng/L     Protime-INR [[id number]]  (Normal) Collected: 01/10/22 0844    Lab Status: Final result Specimen: Blood from Arm, Right Updated: 01/10/22 0921     Protime 12 0 seconds      INR 0 92    CBC and differential [[id number]]  (Abnormal) Collected: 01/10/22 0844    Lab Status: Final result Specimen: Blood from Hand, Right Updated: 01/10/22 0858     WBC 3 35 Thousand/uL      RBC 3 81 Million/uL      Hemoglobin 7 9 g/dL      Hematocrit 25 8 %      MCV 68 fL      MCH 20 7 pg      MCHC 30 6 g/dL      RDW 17 4 %      MPV 8 4 fL      Platelets 283 Thousands/uL      nRBC 0 /100 WBCs      Neutrophils Relative 69 %      Immat GRANS % 0 %      Lymphocytes Relative 16 %      Monocytes Relative 9 %      Eosinophils Relative 5 %      Basophils Relative 1 %      Neutrophils Absolute 2 31 Thousands/µL      Immature Grans Absolute 0 01 Thousand/uL      Lymphocytes Absolute 0 53 Thousands/µL      Monocytes Absolute 0 30 Thousand/µL      Eosinophils Absolute 0 16 Thousand/µL      Basophils Absolute 0 04 Thousands/µL                  CT abdomen pelvis with contrast   Final Result by Melida Alcaraz MD (01/10 1114)      No acute pathology  Cholelithiasis without acute cholecystitis  Hiatal hernia        Massively distended urinary bladder noted  Workstation performed: [id number]               Procedures  Procedures      ED Course  ED Course as of 01/11/22 1344   Mon Surendra 10, 2022   1113 EKG: normal EKG, normal sinus rhythm, unchanged from previous tracings               HEART Risk Score      Most Recent Value   Heart Score Risk Calculator    History 0 Filed at: 01/10/2022 1343   ECG 0 Filed at: 01/10/2022 1343   Age 1 Filed at: 01/10/2022 1343   Risk Factors 1 Filed at: 01/10/2022 1343   Troponin 0 Filed at: 01/10/2022 1343   HEART Score 2 Filed at: 01/10/2022 1343                                MDM  Number of Diagnoses or Management Options  Anemia  Hypokalemia  Nausea & vomiting  Upper GI bleed  Diagnosis management comments: 64y o  year old patient with hx of Schreiber's esophagus presenting with vomiting x 3 weeks with one week of coffeeground emesis  Patient has epigastric abd pain  Patient able to eat and drink    Exam shows epigastric tenderness, tachycardia, excoriations on body  Labs show hypokalemia, hyponatremia and anemia HGB 7 9  Imaging CT abd pelvis WNL  Will admit for upper GI bleed, anemia, hypokalemia         Disposition  Final diagnoses:   Upper GI bleed   Nausea & vomiting   Anemia   Hypokalemia     Time reflects when diagnosis was documented in both MDM as applicable and the Disposition within this note     Time User Action Codes Description Comment    1/10/2022 11:53 AM [place] Sartorius Seok Add [K92 2] Upper GI bleed     1/10/2022 11:53 AM [place] Sartorius Seok Add [R11 2] Nausea & vomiting     1/10/2022 11:53 AM [place] Sartorius Seok Add [D64 9] Anemia     1/10/2022 11:54 AM [place] Sartorius Seok Add [E87 6] Hypokalemia     1/11/2022 11:15 AM Bettylou Jakob Modify [K92 2] Upper GI bleed     1/11/2022 11:15 AM Bettylou Jakob Modify [R11 2] Nausea & vomiting     1/11/2022 11:15 AM Bettylou Jakob Modify [D64 9] Anemia     1/11/2022 11:15 AM Dari Gonzales Modify [E87 6] Hypokalemia       ED Disposition     ED Disposition Condition Date/Time Comment    Admit Stable Mon Surendra 10, 2022 11:53 AM Case was discussed with Dr Aminata Vieira and the patient's admission status was agreed to be Admission Status: inpatient status to the service of Dr Aminata Vieira   Follow-up Information    None         Current Discharge Medication List      CONTINUE these medications which have NOT CHANGED    Details   atorvastatin (LIPITOR) 40 mg tablet Take 1 tablet (40 mg total) by mouth every evening  Qty: 30 tablet, Refills: 0    Associated Diagnoses: Hiatal hernia with GERD and esophagitis      Famotidine (PEPCID PO) Take by mouth 2 tablets daily        ferrous sulfate 325 (65 Fe) mg tablet Take 1 tablet (325 mg total) by mouth 2 (two) times a day with meals  Qty: 60 tablet, Refills: 1    Associated Diagnoses: Anemia, unspecified type      levothyroxine 25 mcg tablet Take 1 tablet (25 mcg total) by mouth daily  Qty: 90 tablet, Refills: 3    Associated Diagnoses: Acquired hypothyroidism      !!  LORazepam (ATIVAN) 2 mg tablet Take 1 tablet (2 mg total) by mouth every 6 (six) hours as needed for anxiety  Qty: 120 tablet, Refills: 1    Comments: F41 9  Associated Diagnoses: Anxiety      pantoprazole (PROTONIX) 40 mg tablet Take 1 tablet (40 mg total) by mouth 2 (two) times a day  Qty: 180 tablet, Refills: 3    Associated Diagnoses: Erosive esophagitis      PARoxetine (PAXIL) 30 mg tablet TAKE 2 TABLETS BY MOUTH DAILY  Qty: 180 tablet, Refills: 3    Associated Diagnoses: Anxiety      QUEtiapine (SEROquel) 400 MG tablet Take 1 tablet (400 mg total) by mouth daily at bedtime  Qty: 90 tablet, Refills: 3    Associated Diagnoses: Schizoaffective disorder, depressive type (HCC)      ziprasidone (GEODON) 80 mg capsule TAKE 1 CAPSULE BY MOUTH EVERY DAY  Qty: 90 capsule, Refills: 3    Associated Diagnoses: ABIMAEL (generalized anxiety disorder)      acetaminophen (TYLENOL) 325 mg tablet Take 3 tablets (975 mg total) by mouth every 8 (eight) hours  Qty: 30 tablet, Refills: 0    Associated Diagnoses: Hiatal hernia with GERD and esophagitis      aluminum-magnesium hydroxide-simethicone (MAALOX MAX) 400-400-40 MG/5ML suspension Take 10 mL by mouth every 6 (six) hours as needed for indigestion or heartburn  Qty: 355 mL, Refills: 0    Associated Diagnoses: Nausea and vomiting; Epigastric pain      aspirin (ECOTRIN LOW STRENGTH) 81 mg EC tablet Take 1 tablet (81 mg total) by mouth daily  Qty: 30 tablet, Refills: 0    Associated Diagnoses: Hiatal hernia with GERD and esophagitis      ibuprofen (MOTRIN) 600 mg tablet       !! LORazepam (ATIVAN) 2 mg tablet TAKE 1 TABLET BY MOUTH EVERY 6 HOURS AS NEEDED FOR ANXIETY  Qty: 120 tablet, Refills: 1    Comments: Not to exceed 4 additional fills before 03/02/2022 DX Code Needed    Associated Diagnoses: Anxiety      meclizine (ANTIVERT) 25 mg tablet Take 1 tablet (25 mg total) by mouth 3 (three) times a day as needed for dizziness  Qty: 30 tablet, Refills: 0    Associated Diagnoses: Dizziness      Multiple Vitamin (MULTIVITAMIN) capsule Take 1 capsule by mouth daily  Qty: 30 capsule, Refills: 0    Associated Diagnoses: Primary osteoarthritis of right hip; Pre-operative laboratory examination      mupirocin (BACTROBAN) 2 % ointment Apply topically 2 (two) times a day  Qty: 22 g, Refills: 0    Associated Diagnoses: Abrasion of fourth toe of left foot, initial encounter      ondansetron (Zofran ODT) 4 mg disintegrating tablet Take 1 tablet (4 mg total) by mouth every 6 (six) hours as needed for nausea or vomiting  Qty: 20 tablet, Refills: 1    Associated Diagnoses: Nausea and vomiting      triamcinolone (KENALOG) 0 1 % cream Apply 1 application topically 2 (two) times a day To affected area  Qty: 160 g, Refills: 2    Associated Diagnoses: Dermal hypersensitivity reaction       !! - Potential duplicate medications found  Please discuss with provider  No discharge procedures on file      PDMP Review       Value Time User    PDMP Reviewed  Yes 12/8/2021  8:40 AM Julia José MD           ED Provider  Attending physically available and evaluated Giovanni Presley  DAMARIS managed the patient along with the ED Attending      Electronically Signed by         Rachael Gunderson MD  01/11/22 8110

## 2022-01-10 NOTE — ED PROCEDURE NOTE
Procedure  POC Biliary US    Date/Time: 1/10/2022 11:33 AM  Performed by: Ruth Dhillon DO  Authorized by: Ruth Dhillon DO     Patient location:  ED  Performed by:  Resident  Procedure details:     Exam Type:  Diagnostic and educational    Indications: upper right quadrant abdominal pain      Assessment for:  Cholecystitis and cholelithiasis    Views obtained: gallbladder (transverse and longitudinal)      Image quality: limited diagnostic      Image availability:  Images available in PACS  Findings:     Cholelithiasis: identified      Common bile duct:  Unable to visualize    Gallbladder wall:  Abnormal    Sonographic Lr's sign: positive    Interpretation:     Biliary ultrasound impressions: cholelithiasis      Biliary ultrasound impressions comment:  Questionable cholecystitis                      Ruth Dhillon DO  01/10/22 1134

## 2022-01-10 NOTE — ED PROCEDURE NOTE
Procedure  POC FAST US    Date/Time: 1/10/2022 11:35 AM  Performed by: Theresa Harrison DO  Authorized by: Theresa Harrison DO     Patient location:  ED  Procedure details:     Exam Type:  Educational    Indications: abdominal pain      Technique: FAST      Views obtained:  Heart - Pericardial sac, RUQ - Rivero's Pouch, LUQ - Splenorenal space and Suprapubic - Pouch of [place]    Image quality: diagnostic      Image availability:  Images available in PACS  FAST Findings:     RUQ (Hepatorenal) free fluid: absent      LUQ (Splenorenal) free fluid: absent      Suprapubic free fluid: absent    Interpretation:     Impressions: negative                       Theresa Harrison DO  01/10/22 1137

## 2022-01-10 NOTE — ASSESSMENT & PLAN NOTE
· Vomiting for 3 weeks, last week had episodes of dark like black or brown vomiting, now again clearer  · HB on admission 7 9; recent BL 10-11  · H/o barretts esophagus, hiatal hernia repair  · Last EGD in 9/2021 -> barretts, hiatal hernia repair looked intact  · No aspirin or NSAID use  · Seen by GI EGD tomorrow  · Clear liq diet today, NPO after MN  · PPI IV BID  · Monitor Hb

## 2022-01-10 NOTE — ED ATTENDING ATTESTATION
1/10/2022  Lucrecia Huang DO, saw and evaluated the patient  I have discussed the patient with the resident/non-physician practitioner and agree with the resident's/non-physician practitioner's findings, Plan of Care, and MDM as documented in the resident's/non-physician practitioner's note, except where noted  All available labs and Radiology studies were reviewed  I was present for key portions of any procedure(s) performed by the resident/non-physician practitioner and I was immediately available to provide assistance  At this point I agree with the current assessment done in the Emergency Department  I have conducted an independent evaluation of this patient a history and physical is as follows:      64 yof w report of three weeks of intermittent coffee ground emesis, worsening over recent several days  Sent in by PCP  +light headed  No abd pain, chest pain, SOB    Also reports itchy skin rash in isolated areas  Past Medical History:   Diagnosis Date    Anxiety     Arthritis     Back pain at L4-L5 level     Schreiber esophagus     Bulimia     Colon polyp     Disease of thyroid gland     hypothyroid    Ear problems     GERD (gastroesophageal reflux disease)     History of transfusion     Hyperlipidemia     Hypothyroidism     Leukopenia     Nasal congestion     NMS (neuroleptic malignant syndrome) 01/03/2020    Pneumonia     Rheumatoid arthritis involving right hip (HCC)     Sciatica     Seizure ([address] )     due to medication mix up 8/2018 only one historically    Seizures (HCC)     Synovial cyst of lumbar spine     Vertigo     Weight gain      BP (!) 175/84   Pulse 105   Temp 98 4 °F (36 9 °C) (Oral)   Resp 18   SpO2 99%   NAD, A&Ox4, slightly tachy, no resp distress, abd soft w mild TTP in epigastric area   Isolated excoriations on legs, back, chest      CBC, CMP, lipase, INR  consider admission for potential GIB        ED Course         Critical Care Time  Procedures

## 2022-01-10 NOTE — ASSESSMENT & PLAN NOTE
· Hb 7 9  · BL 10-11  · ?  From upper GI bleed  · Monitor H&H q 8hr, if drops < 7 5 will transfuse  · Consent signed & in chart  · Hold DVT Px

## 2022-01-10 NOTE — H&P
[facility]  H&P- Ping Liu 1960, 64 y o  female MRN: [id number]  Unit/Bed#: [facility] 924-01 Encounter: [id number]  Primary Care Provider: Josesito Garcia MD   Date and time admitted to hospital: 1/10/2022  7:20 AM    * Upper GI bleed  Assessment & Plan  · Vomiting for 3 weeks, last week had episodes of dark like black or brown vomiting, now again clearer  · HB on admission 7 9; recent BL 10-11  · H/o barretts esophagus, hiatal hernia repair  · Last EGD in 9/2021 -> barretts, hiatal hernia repair looked intact  · No aspirin or NSAID use  · Seen by GI EGD tomorrow  · Clear liq diet today, NPO after MN  · PPI IV BID  · Monitor Hb    Symptomatic anemia  Assessment & Plan  · Hb 7 9  · BL 10-11  · ? From upper GI bleed  · Monitor H&H q 8hr, if drops < 7 5 will transfuse  · Consent signed & in chart  · Hold DVT Px    Hyponatremia  Assessment & Plan  · Suspect hypovolemic hyponatremia  · H/o same in past also  · ? Chronic component of SIADH, on SSRI  · For now will give slow IVF, BMP in am    Hypokalemia  Assessment & Plan  · Suspect from vomiting  · K 2 7     · Received 60 in ED< will give additional 40 meq today    Nausea and vomiting  Assessment & Plan  As above    Hiatal hernia with gastroesophageal reflux disease and esophagitis  Assessment & Plan  · H/o repair    Rash  Assessment & Plan  · Unclear etiology  · Denies bed bugs  · Currently some on back -> small papules with small fluid filled blister, neck has red papular rash, rash on legs is very excoritaed to determine underlying rash  · since 2 months, uses kenalog at home  · Will place on nystatin- triamcinolone combo with benadryl cream    Gastroesophageal reflux disease with esophagitis without hemorrhage  Assessment & Plan  · Takes protonix BID at home  · Not on pepcid or carafate    ABIMAEL (generalized anxiety disorder)  Assessment & Plan  · Cont paxil, prn ativan  · PDMP reviewed - ativan last filled 12/7/21    Schizoaffective disorder (HCC)  Assessment & Plan  Cont seroquel, geodon    Acquired hypothyroidism  Assessment & Plan  · Cont levothyroxine      VTE Pharmacologic Prophylaxis:   Moderate Risk (Score 3-4) - Pharmacological DVT Prophylaxis Contraindicated  Sequential Compression Devices Ordered  Code Status: Level 1 - Full Code   Discussion with family: patient  Anticipated Length of Stay: Patient will be admitted on an inpatient basis with an anticipated length of stay of greater than 2 midnights secondary to UGIB, anemia, low lytes  Total Time for Visit, including Counseling / Coordination of Care: 45 minutes Greater than 50% of this total time spent on direct patient counseling and coordination of care  Chief Complaint: vomiting, rash     History of Present Illness:  Werner Moyer is a 64 y o  female with a PMH of schizoaffective disorder, Schreiber's esophagus, hiatal hernia status post repair who presents with episodes of vomiting and concerns about rash according the patient  She feels that her PCP sent her here because of the rash  Which sounds like patient has been having 2-4 episodes of vomiting each day since last 3 weeks, at the end of last week she had noticed some brown and black colored vomitus, there is a note by PCP stating that patient had informed them about the same was recommended ED evaluation  Patient told my student that few gets vomiting not related to food but when her mom is around, and also told me that she gets rash because of vomiting because of the toxins that she inhales from the vomiting  No other symptoms like abdominal pain diarrhea or constipation  Upon presentation was noted to hemoglobin of 7 9 with previous current baseline of 10-11-she is being admitted for evaluation of upper GI bleed    Review of Systems:  Review of Systems   Constitutional: Negative for activity change, appetite change, chills and fever     HENT: Negative for congestion and rhinorrhea  Respiratory: Negative for cough, shortness of breath and wheezing  Cardiovascular: Negative for chest pain, palpitations and leg swelling  Gastrointestinal: Positive for vomiting  Negative for abdominal pain, constipation and diarrhea  Genitourinary: Negative for difficulty urinating and dysuria  Neurological: Negative for dizziness and light-headedness  All other systems reviewed and are negative  Past Medical and Surgical History:   Past Medical History:   Diagnosis Date    Anxiety     Arthritis     Back pain at L4-L5 level     Schreiber esophagus     Bulimia     Colon polyp     Disease of thyroid gland     hypothyroid    Ear problems     GERD (gastroesophageal reflux disease)     History of transfusion     Hyperlipidemia     Hypothyroidism     Leukopenia     Nasal congestion     NMS (neuroleptic malignant syndrome) 01/03/2020    Pneumonia     Rheumatoid arthritis involving right hip (HCC)     Sciatica     Seizure ([facility] [address] )     due to medication mix up 8/2018 only one historically    Seizures ([facility] [address]     Synovial cyst of lumbar spine     Vertigo     Weight gain        Past Surgical History:   Procedure Laterality Date    BONE MARROW BIOPSY      BREAST SURGERY      enlargement with implants    CLOSED REDUCTION DISTAL FEMUR FRACTURE      COLONOSCOPY      COSMETIC SURGERY      DENTAL SURGERY      HIP ARTHROPLASTY Right 1/2/2020    Procedure: REVISION TOTAL HIP ARTHROPLASTY WITH REPAIR OF PARIPROSTHETIC FRACTURE - POSTERIOR APPROACH;  Surgeon: Jenna Fermin MD;  [facility];  Service: Orthopedics    OR ESOPHAGOGASTRODUODENOSCOPY TRANSORAL DIAGNOSTIC N/A 5/11/2021    Procedure: ESOPHAGOGASTRODUODENOSCOPY (EGD); Surgeon:  Tra Ayoub MD;  [facility];  Service: General    OR ESOPHAGUS SURG PROCEDURE UNLISTED N/A 5/11/2021    Procedure: FUNDOPLICATION TRANSORAL INCISIONLESS;  Surgeon: Vangie Billy MD;  [facility];  Service: Gastroenterology    FL LAP, REPAIR PARAESOPHAGEAL HERNIA, INCL FUNDOPLASTY W/ MESH N/A 5/11/2021    Procedure: REPAIR HERNIA PARAESOPHAGEAL  LAPAROSCOPIC;  Surgeon: Donnie Ann MD;  [facility];  Service: General    FL TOTAL HIP ARTHROPLASTY Right 12/11/2019    Procedure: ARTHROPLASTY HIP TOTAL ANTERIOR;  Surgeon: Shakila Diaz MD;  [facility];  Service: Orthopedics    RHINOPLASTY      SINUS SURGERY      TRANSORAL INCISIONLESS FUNDOPLICATION (TIF)  00/93/7189       Meds/Allergies:  Prior to Admission medications    Medication Sig Start Date End Date Taking?  Authorizing Provider   atorvastatin (LIPITOR) 40 mg tablet Take 1 tablet (40 mg total) by mouth every evening 5/14/21  Yes ABENA Stephenson   Famotidine (PEPCID PO) Take by mouth 2 tablets daily     Yes Historical Provider, MD   ferrous sulfate 325 (65 Fe) mg tablet Take 1 tablet (325 mg total) by mouth 2 (two) times a day with meals 11/23/20  Yes Meaghan Cross PA-C   levothyroxine 25 mcg tablet Take 1 tablet (25 mcg total) by mouth daily 5/19/21  Yes Rik Cordon MD   LORazepam (ATIVAN) 2 mg tablet Take 1 tablet (2 mg total) by mouth every 6 (six) hours as needed for anxiety 12/7/21  Yes Rik Cordon MD   pantoprazole (PROTONIX) 40 mg tablet Take 1 tablet (40 mg total) by mouth 2 (two) times a day 12/20/21  Yes Rik Cordon MD   PARoxetine (PAXIL) 30 mg tablet TAKE 2 TABLETS BY MOUTH DAILY 11/8/21  Yes Rik Cordon MD   QUEtiapine (SEROquel) 400 MG tablet Take 1 tablet (400 mg total) by mouth daily at bedtime 12/8/21  Yes Rik Cordon MD   ziprasidone (GEODON) 80 mg capsule TAKE 1 CAPSULE BY MOUTH EVERY DAY 11/8/21  Yes Rik Cordon MD   acetaminophen (TYLENOL) 325 mg tablet Take 3 tablets (975 mg total) by mouth every 8 (eight) hours 5/14/21   Sharlotte Castleman, CRNP   aluminum-magnesium hydroxide-simethicone (MAALOX MAX) [phone] MG/5ML suspension Take 10 mL by mouth every 6 (six) hours as needed for indigestion or heartburn  Patient not taking: Reported on 11/4/2021 10/2/21   Rakan Pires DO   aspirin (ECOTRIN LOW STRENGTH) 81 mg EC tablet Take 1 tablet (81 mg total) by mouth daily  Patient not taking: Reported on 1/10/2022  5/15/21   ABENA Crum   ibuprofen (MOTRIN) 600 mg tablet  10/18/21   Historical Provider, MD   LORazepam (ATIVAN) 2 mg tablet TAKE 1 TABLET BY MOUTH EVERY 6 HOURS AS NEEDED FOR ANXIETY  12/8/21   Hardeep Tenorio MD   meclizine (ANTIVERT) 25 mg tablet Take 1 tablet (25 mg total) by mouth 3 (three) times a day as needed for dizziness  Patient not taking: Reported on 1/10/2022  5/29/21   Nesha Mendes MD   Multiple Vitamin (MULTIVITAMIN) capsule Take 1 capsule by mouth daily  Patient not taking: Reported on 1/10/2022  11/14/19   Bishop Corado PA-C   pirocin [facility]) 2 % ointment Apply topically 2 (two) times a day  Patient not taking: Reported on 1/10/2022  12/6/21   Raoul Mcekon DPM   ondansetron (Zofran ODT) 4 mg disintegrating tablet Take 1 tablet (4 mg total) by mouth every 6 (six) hours as needed for nausea or vomiting  Patient not taking: Reported on 1/10/2022  12/16/21   Hardeep Tenorio MD   triamcinolone (KENALOG) 0 1 % cream Apply 1 application topically 2 (two) times a day To affected area  Patient not taking: Reported on 1/10/2022  1/3/22   Hardeep Tenorio MD   pantoprazole (PROTONIX) 40 mg tablet Take 1 tablet (40 mg total) by mouth 2 (two) times a day 5/18/21   Андрей Eugene MD     I have reviewed home medications with a medical source (PCP, Pharmacy, other)  Allergies:    Allergies   Allergen Reactions    Risperdal [Risperidone] Shortness Of Breath     Rapid heart beat, SOB    Zyprexa [Olanzapine] Shortness Of Breath     Rapid heartbeat    Latex Rash     Band aids, adhesives wears normal underwear, can eat bananas  USE PAPER TAPE       Social History:  Marital Status: Single   Patient Pre-hospital Living Situation: Home  Patient Pre-hospital Level of Mobility: walks  Patient Pre-hospital Diet Restrictions: none  Substance Use History:   Social History     Substance and Sexual Activity   Alcohol Use Not Currently     Social History     Tobacco Use   Smoking Status Never Smoker   Smokeless Tobacco Never Used     Social History     Substance and Sexual Activity   Drug Use Not Currently       Family History:  Family History   Problem Relation Age of Onset    Uterine cancer Mother     Esophageal cancer Father     Colon cancer Maternal Grandmother        Physical Exam:     Vitals:   Blood Pressure: 143/77 (01/10/22 1100)  Pulse: 80 (01/10/22 1100)  Temperature: 98 4 °F (36 9 °C) (01/10/22 0725)  Temp Source: Oral (01/10/22 0725)  Respirations: 19 (01/10/22 1100)  SpO2: 96 % (01/10/22 1100)    Physical Exam  Vitals reviewed  HENT:      Head: Normocephalic and atraumatic  Cardiovascular:      Rate and Rhythm: Normal rate and regular rhythm  Heart sounds: Normal heart sounds  Pulmonary:      Effort: Pulmonary effort is normal  No respiratory distress  Breath sounds: Normal breath sounds  No wheezing  Abdominal:      General: There is no distension  Palpations: Abdomen is soft  Tenderness: There is no abdominal tenderness  Musculoskeletal:      Right lower leg: No edema  Left lower leg: No edema  Skin:     Findings: Rash present  Neurological:      Mental Status: She is alert            Additional Data:     Lab Results:  Results from last 7 days   Lab Units 01/10/22  0844   WBC Thousand/uL 3 35*   HEMOGLOBIN g/dL 7 9*   HEMATOCRIT % 25 8*   PLATELETS Thousands/uL 283   NEUTROS PCT % 69   LYMPHS PCT % 16   MONOS PCT % 9   EOS PCT % 5     Results from last 7 days   Lab Units 01/10/22  0844   SODIUM mmol/L 130*   POTASSIUM mmol/L 2 7*   CHLORIDE mmol/L 94*   CO2 mmol/L 28   BUN mg/dL 3*   CREATININE mg/dL 0 71   ANION GAP mmol/L 8   CALCIUM mg/dL 8 7   ALBUMIN g/dL 3 1*   TOTAL BILIRUBIN mg/dL 0 41   ALK PHOS U/L 81   ALT U/L 33   AST U/L 35   GLUCOSE RANDOM mg/dL 91     Results from last 7 days   Lab Units 01/10/22  0844   INR  0 92                   Imaging: Reviewed radiology reports from this admission including: abdominal/pelvic CT  CT abdomen pelvis with contrast   Final Result by Breonna Childs MD (01/10 1114)      No acute pathology  Cholelithiasis without acute cholecystitis  Hiatal hernia  Massively distended urinary bladder noted  Workstation performed: [id number]             EKG and Other Studies Reviewed on Admission:   · EKG: NSR  HR WNL  ** Please Note: This note has been constructed using a voice recognition system   **

## 2022-01-10 NOTE — CONSULTS
[facility] - Inpatient Consultation  Christopher Ill 64 y o  female MRN: [id number]  Unit/Bed#: ZAR[facility] Encounter: [id number]    Reason for Consult / Principal Problem:     Coffee-ground emesis, anemia    ASSESSMENT & PLAN:      43-year-old female with history of anxiety, hypothyroidism, history of Schreiber's esophagus, hiatal hernia status post TIF who presented with nausea/vomiting as well as reported episode of coffee-ground emesis and found to have anemia  GI consultation requested for nausea/vomiting, hematemesis  1  Vomiting, hematemesis - etiology for her vomiting remains unclear  Without associated nausea, could be possible rumination syndrome although she reports foul-smelling emesis so this is less likely  Could be secondary to underlying gastroparesis or other functional disorder  Denies any dysphagia or odynophagia so less likely to be intrinsic stricture or obstructing lesion  Reported hematemesis likely secondary to erosive esophagitis versus Eloisa-Navas tear  Low suspicion for any variceal hemorrhage  Could possibly secondary to PUD or other upper GI lesion  · Plan for EGD tomorrow for further evaluation  · Okay to remain on clear liquids today as tolerated   · NPO at midnight for procedure tomorrow  · IV PPI Q12h  · Follow clinically  · Hold any antiplatelets or anticoagulants  · Antiemetics and supportive care per primary team    2  Anemia - hemoglobin 7 9 which appears to be a 3-4 gm drop  No evidence of rey GI bleeding at this time  Could be secondary to above  Hemodynamically stable  · GI recommendations as noted above   · Follow-up CBC in a m   · Transfuse as needed for hemoglobin less than 7 and/or symptomatic anemia  · Supportive care per primary team    3  History of Schreiber's esophagus - history of C2 M3 Schreiber's esophagus noted on recent EGD from 09/2021  However, biopsies did not show any intestinal metaplasia or dysplasia    · Plan for EGD for UGIB as noted above  · Continue PPI twice daily  · Outpatient follow-up with Dr Heydi Leiva    ______________________________________________________________________    HISTORY OF PRESENT ILLNESS:  51-year-old female with history of anxiety, hypothyroidism, history of Schreiber's esophagus, hiatal hernia status post TIF who presented with nausea/vomiting as well as reported episode of coffee-ground emesis  Initial evaluation in the ED found acute anemia with hemoglobin of 7 9   GI consultation requested for evaluation and possible need for endoscopy  Patient reports that she has had intractable vomiting up to 3 episodes a day for the last 3 weeks  She denies any significant nausea but reports sudden onset of vomiting episodes  Three days ago, she noticed some dark colored emesis  She denies any rey hematemesis  She denies any melena, hematochezia, diarrhea, constipation, change in bowel habits, or abdominal pain  She denies any sick contacts  She denies frequent NSAID use  She is not on blood thinners  Last EGD was in 9/22/2021 with finding of C2 M3 Schreiber's esophagus, status post hiatal hernia repair with intact fundoplication  Remainder of EGD was reportedly normal   Her last episode of vomiting was yesterday evening  She denies any current nausea        REVIEW OF SYSTEMS:    CONSTITUTIONAL: Denies any fever, chills, rigors, and weight loss  HEENT: No earache or tinnitus, denies hearing loss or visual disturbances  CARDIOVASCULAR: No chest pain or palpitations   RESPIRATORY: Denies any cough, hemoptysis, shortness of breath or dyspnea on exertion  GASTROINTESTINAL: As noted in the History of Present Illness   GENITOURINARY: No problems with urination, denies any hematuria or dysuria  NEUROLOGIC: No dizziness or vertigo, denies headaches   MUSCULOSKELETAL: Denies any muscle or joint pain   SKIN: Denies skin rashes or itching   ENDOCRINE: Denies excessive thirst, denies intolerance to heat or cold  PSYCHOSOCIAL: Denies depression or anxiety, denies any recent memory loss     Historical Information   Past Medical History:   Diagnosis Date    Anxiety     Arthritis     Back pain at L4-L5 level     Schreiber esophagus     Bulimia     Colon polyp     Disease of thyroid gland     hypothyroid    Ear problems     GERD (gastroesophageal reflux disease)     History of transfusion     Hyperlipidemia     Hypothyroidism     Leukopenia     Nasal congestion     NMS (neuroleptic malignant syndrome) 01/03/2020    Pneumonia     Rheumatoid arthritis involving right hip (HCC)     Sciatica     Seizure ([facility] [address] )     due to medication mix up 8/2018 only one historically    Seizures ([facility] [address] )     Synovial cyst of lumbar spine     Vertigo     Weight gain      Past Surgical History:   Procedure Laterality Date    BONE MARROW BIOPSY      BREAST SURGERY      enlargement with implants    CLOSED REDUCTION DISTAL FEMUR FRACTURE      COLONOSCOPY      COSMETIC SURGERY      DENTAL SURGERY      HIP ARTHROPLASTY Right 1/2/2020    Procedure: REVISION TOTAL HIP ARTHROPLASTY WITH REPAIR OF PARIPROSTHETIC FRACTURE - POSTERIOR APPROACH;  Surgeon: Oziel Galvan MD;  [facility];  Service: Orthopedics    NC ESOPHAGOGASTRODUODENOSCOPY TRANSORAL DIAGNOSTIC N/A 5/11/2021    Procedure: ESOPHAGOGASTRODUODENOSCOPY (EGD); Surgeon: Michelle Henry MD;  [facility];  Service: General    NC ESOPHAGUS SURG PROCEDURE UNLISTED N/A 5/11/2021    Procedure: FUNDOPLICATION TRANSORAL INCISIONLESS;  Surgeon: Mendez Jefferson MD;  [facility];  Service: Gastroenterology    NC LAP, REPAIR PARAESOPHAGEAL HERNIA, INCL FUNDOPLASTY W/ MESH N/A 5/11/2021    Procedure: REPAIR HERNIA PARAESOPHAGEAL  LAPAROSCOPIC;  Surgeon:  Michelle Henry MD;  [facility];  Service: General    NC TOTAL HIP ARTHROPLASTY Right 12/11/2019    Procedure: ARTHROPLASTY HIP TOTAL ANTERIOR;  Surgeon: Oziel Galvan MD;  [facility];  Service: Orthopedics    RHINOPLASTY      SINUS SURGERY      TRANSORAL INCISIONLESS FUNDOPLICATION (TIF)  91/33/1869     Social History   Social History     Substance and Sexual Activity   Alcohol Use Not Currently     Social History     Substance and Sexual Activity   Drug Use Not Currently     Social History     Tobacco Use   Smoking Status Never Smoker   Smokeless Tobacco Never Used     Family History   Problem Relation Age of Onset    Uterine cancer Mother     Esophageal cancer Father     Colon cancer Maternal Grandmother        Meds/Allergies   (Not in a hospital admission)    Current Facility-Administered Medications   Medication Dose Route Frequency    pantoprazole (PROTONIX) injection 40 mg  40 mg Intravenous Q12H [facility]    potassium chloride 20 mEq IVPB (premix)  20 mEq Intravenous Once     Allergies   Allergen Reactions    Risperdal [Risperidone] Shortness Of Breath     Rapid heart beat, SOB    Zyprexa [Olanzapine] Shortness Of Breath     Rapid heartbeat    Latex Rash     Band aids, adhesives wears normal underwear, can eat bananas  USE PAPER TAPE       PHYSICAL EXAM:      Objective   Blood pressure 143/77, pulse 80, temperature 98 4 °F (36 9 °C), temperature source Oral, resp  rate 19, SpO2 96 %, not currently breastfeeding  There is no height or weight on file to calculate BMI  No intake or output data in the 24 hours ending 01/10/22 1235    General Appearance:   Alert, cooperative, no distress   HEENT:   Normocephalic, atraumatic, anicteric     Neck:   Supple, symmetrical, trachea midline   Lungs:   Equal chest rise, respirations unlabored    Heart:   Regular rate and rhythm   Abdomen:   Soft, non-tender, non-distended; normal bowel sounds; no masses, no organomegaly    Rectal:   Deferred    Extremities:   No cyanosis, clubbing or edema    Neuro:    Moves all 4 extremities    Skin:   No jaundice, rashes, or lesions      LAB RESULTS:     Admission on 01/10/2022   Component Date Value    WBC 01/10/2022 3 35*    RBC 01/10/2022 3 81     Hemoglobin 01/10/2022 7 9*    Hematocrit 01/10/2022 25 8*    MCV 01/10/2022 68*    MCH 01/10/2022 20 7*    MCHC 01/10/2022 30 6*    RDW 01/10/2022 17 4*    MPV 01/10/2022 8 4*    Platelets 88/74/4526 283     nRBC 01/10/2022 0     Neutrophils Relative 01/10/2022 69     Immat GRANS % 01/10/2022 0     Lymphocytes Relative 01/10/2022 16     Monocytes Relative 01/10/2022 9     Eosinophils Relative 01/10/2022 5     Basophils Relative 01/10/2022 1     Neutrophils Absolute 01/10/2022 2 31     Immature Grans Absolute 01/10/2022 0 01     Lymphocytes Absolute 01/10/2022 0 53*    Monocytes Absolute 01/10/2022 0 30     Eosinophils Absolute 01/10/2022 0 16     Basophils Absolute 01/10/2022 0 04     Sodium 01/10/2022 130*    Potassium 01/10/2022 2 7*    Chloride 01/10/2022 94*    CO2 01/10/2022 28     ANION GAP 01/10/2022 8     BUN 01/10/2022 3*    Creatinine 01/10/2022 0 71     Glucose 01/10/2022 91     Calcium 01/10/2022 8 7     Corrected Calcium 01/10/2022 9 4     AST 01/10/2022 35     ALT 01/10/2022 33     Alkaline Phosphatase 01/10/2022 81     Total Protein 01/10/2022 6 6     Albumin 01/10/2022 3 1*    Total Bilirubin 01/10/2022 0 41     eGFR 01/10/2022 92     hs TnI 0hr 01/10/2022 4     Lipase 01/10/2022 55*    ABO Grouping 01/10/2022 O     Rh Factor 01/10/2022 Positive     Antibody Screen 01/10/2022 Negative     Specimen Expiration Date 01/10/2022 [id number]     Protime 01/10/2022 12 0     INR 01/10/2022 0 92     hs TnI 2hr 01/10/2022 3     Delta 2hr hsTnI 01/10/2022 -1     Ventricular Rate 01/10/2022 83     Atrial Rate 01/10/2022 83     IN Interval 01/10/2022 142     QRSD Interval 01/10/2022 86     QT Interval 01/10/2022 396     QTC Interval 01/10/2022 465     P Axis 01/10/2022 60     QRS Axis 01/10/2022 25     T Wave Axis 01/10/2022 65     Ventricular Rate 01/10/2022 85     Atrial Rate 01/10/2022 85     IN Interval 01/10/2022 138     QRSD Interval 01/10/2022 84     QT Interval 01/10/2022 406     QTC Interval 01/10/2022 483     P Axis 01/10/2022 66     QRS Axis 01/10/2022 29     T Wave Axis 01/10/2022 70        RADIOLOGY RESULTS: I have personally reviewed pertinent imaging studies  SHAINA Babb  Gastroenterology Fellow  [facility]  Available on [email or web address]

## 2022-01-10 NOTE — ASSESSMENT & PLAN NOTE
· Unclear etiology  · Denies bed bugs  · Currently some on back -> small papules with small fluid filled blister, neck has red papular rash, rash on legs is very excoritaed to determine underlying rash  · since 2 months, uses kenalog at home  · Will place on nystatin- triamcinolone combo with benadryl cream

## 2022-01-11 ENCOUNTER — ANESTHESIA EVENT (INPATIENT)
Dept: GASTROENTEROLOGY | Facility: HOSPITAL | Age: 62
DRG: 378 | End: 2022-01-11
Payer: MEDICARE

## 2022-01-11 ENCOUNTER — ANESTHESIA (INPATIENT)
Dept: GASTROENTEROLOGY | Facility: HOSPITAL | Age: 62
DRG: 378 | End: 2022-01-11
Payer: MEDICARE

## 2022-01-11 ENCOUNTER — APPOINTMENT (INPATIENT)
Dept: GASTROENTEROLOGY | Facility: HOSPITAL | Age: 62
DRG: 378 | End: 2022-01-11
Payer: MEDICARE

## 2022-01-11 LAB
ANION GAP SERPL CALCULATED.3IONS-SCNC: 6 MMOL/L (ref 4–13)
BUN SERPL-MCNC: 3 MG/DL (ref 5–25)
CALCIUM SERPL-MCNC: 8.3 MG/DL (ref 8.3–10.1)
CHLORIDE SERPL-SCNC: 101 MMOL/L (ref 100–108)
CO2 SERPL-SCNC: 28 MMOL/L (ref 21–32)
CREAT SERPL-MCNC: 0.67 MG/DL (ref 0.6–1.3)
FERRITIN SERPL-MCNC: 8 NG/ML (ref 8–388)
GFR SERPL CREATININE-BSD FRML MDRD: 95 ML/MIN/1.73SQ M
GLUCOSE SERPL-MCNC: 79 MG/DL (ref 65–140)
HCT VFR BLD AUTO: 27.3 % (ref 34.8–46.1)
HCT VFR BLD AUTO: 29.2 % (ref 34.8–46.1)
HGB BLD-MCNC: 8 G/DL (ref 11.5–15.4)
HGB BLD-MCNC: 8.6 G/DL (ref 11.5–15.4)
IRON SATN MFR SERPL: 12 % (ref 15–50)
IRON SERPL-MCNC: 52 UG/DL (ref 50–170)
POTASSIUM SERPL-SCNC: 3.7 MMOL/L (ref 3.5–5.3)
SODIUM SERPL-SCNC: 135 MMOL/L (ref 136–145)
TIBC SERPL-MCNC: 442 UG/DL (ref 250–450)

## 2022-01-11 PROCEDURE — 82728 ASSAY OF FERRITIN: CPT | Performed by: INTERNAL MEDICINE

## 2022-01-11 PROCEDURE — 85018 HEMOGLOBIN: CPT | Performed by: HOSPITALIST

## 2022-01-11 PROCEDURE — 85014 HEMATOCRIT: CPT | Performed by: HOSPITALIST

## 2022-01-11 PROCEDURE — C9113 INJ PANTOPRAZOLE SODIUM, VIA: HCPCS | Performed by: HOSPITALIST

## 2022-01-11 PROCEDURE — 0DB68ZX EXCISION OF STOMACH, VIA NATURAL OR ARTIFICIAL OPENING ENDOSCOPIC, DIAGNOSTIC: ICD-10-PCS | Performed by: INTERNAL MEDICINE

## 2022-01-11 PROCEDURE — 88305 TISSUE EXAM BY PATHOLOGIST: CPT | Performed by: PATHOLOGY

## 2022-01-11 PROCEDURE — 97166 OT EVAL MOD COMPLEX 45 MIN: CPT

## 2022-01-11 PROCEDURE — 83540 ASSAY OF IRON: CPT | Performed by: INTERNAL MEDICINE

## 2022-01-11 PROCEDURE — 83550 IRON BINDING TEST: CPT | Performed by: INTERNAL MEDICINE

## 2022-01-11 PROCEDURE — 80048 BASIC METABOLIC PNL TOTAL CA: CPT | Performed by: HOSPITALIST

## 2022-01-11 PROCEDURE — 97162 PT EVAL MOD COMPLEX 30 MIN: CPT

## 2022-01-11 PROCEDURE — 0DB58ZX EXCISION OF ESOPHAGUS, VIA NATURAL OR ARTIFICIAL OPENING ENDOSCOPIC, DIAGNOSTIC: ICD-10-PCS | Performed by: INTERNAL MEDICINE

## 2022-01-11 PROCEDURE — 99232 SBSQ HOSP IP/OBS MODERATE 35: CPT

## 2022-01-11 PROCEDURE — 43239 EGD BIOPSY SINGLE/MULTIPLE: CPT | Performed by: INTERNAL MEDICINE

## 2022-01-11 RX ORDER — PROPOFOL 10 MG/ML
INJECTION, EMULSION INTRAVENOUS AS NEEDED
Status: DISCONTINUED | OUTPATIENT
Start: 2022-01-11 | End: 2022-01-11

## 2022-01-11 RX ORDER — PROPOFOL 10 MG/ML
INJECTION, EMULSION INTRAVENOUS CONTINUOUS PRN
Status: DISCONTINUED | OUTPATIENT
Start: 2022-01-11 | End: 2022-01-11

## 2022-01-11 RX ORDER — TRIAMCINOLONE ACETONIDE 1 MG/G
CREAM TOPICAL 2 TIMES DAILY
Status: DISCONTINUED | OUTPATIENT
Start: 2022-01-11 | End: 2022-01-12 | Stop reason: HOSPADM

## 2022-01-11 RX ORDER — LIDOCAINE HYDROCHLORIDE 10 MG/ML
INJECTION, SOLUTION EPIDURAL; INFILTRATION; INTRACAUDAL; PERINEURAL AS NEEDED
Status: DISCONTINUED | OUTPATIENT
Start: 2022-01-11 | End: 2022-01-11

## 2022-01-11 RX ORDER — SODIUM CHLORIDE 9 MG/ML
INJECTION, SOLUTION INTRAVENOUS CONTINUOUS PRN
Status: DISCONTINUED | OUTPATIENT
Start: 2022-01-11 | End: 2022-01-11

## 2022-01-11 RX ADMIN — SODIUM CHLORIDE: 0.9 INJECTION, SOLUTION INTRAVENOUS at 10:55

## 2022-01-11 RX ADMIN — PROPOFOL 100 MG: 10 INJECTION, EMULSION INTRAVENOUS at 11:07

## 2022-01-11 RX ADMIN — PANTOPRAZOLE SODIUM 40 MG: 40 INJECTION, POWDER, FOR SOLUTION INTRAVENOUS at 22:54

## 2022-01-11 RX ADMIN — TRIAMCINOLONE ACETONIDE 1 APPLICATION.: 1 CREAM TOPICAL at 17:18

## 2022-01-11 RX ADMIN — SODIUM CHLORIDE, SODIUM GLUCONATE, SODIUM ACETATE, POTASSIUM CHLORIDE, MAGNESIUM CHLORIDE, SODIUM PHOSPHATE, DIBASIC, AND POTASSIUM PHOSPHATE 50 ML/HR: .53; .5; .37; .037; .03; .012; .00082 INJECTION, SOLUTION INTRAVENOUS at 12:29

## 2022-01-11 RX ADMIN — TRIAMCINOLONE ACETONIDE: 1 CREAM TOPICAL at 13:59

## 2022-01-11 RX ADMIN — ACETAMINOPHEN 975 MG: 325 TABLET, FILM COATED ORAL at 22:54

## 2022-01-11 RX ADMIN — FERROUS SULFATE TAB 325 MG (65 MG ELEMENTAL FE) 325 MG: 325 (65 FE) TAB at 17:18

## 2022-01-11 RX ADMIN — PROPOFOL 150 MCG/KG/MIN: 10 INJECTION, EMULSION INTRAVENOUS at 11:08

## 2022-01-11 RX ADMIN — PAROXETINE HYDROCHLORIDE 60 MG: 20 TABLET, FILM COATED ORAL at 09:09

## 2022-01-11 RX ADMIN — LIDOCAINE HYDROCHLORIDE 80 MG: 10 INJECTION, SOLUTION EPIDURAL; INFILTRATION; INTRACAUDAL; PERINEURAL at 11:07

## 2022-01-11 RX ADMIN — QUETIAPINE FUMARATE 400 MG: 100 TABLET ORAL at 22:54

## 2022-01-11 RX ADMIN — LEVOTHYROXINE SODIUM 25 MCG: 25 TABLET ORAL at 05:20

## 2022-01-11 RX ADMIN — PANTOPRAZOLE SODIUM 40 MG: 40 INJECTION, POWDER, FOR SOLUTION INTRAVENOUS at 09:10

## 2022-01-11 RX ADMIN — ZIPRASIDONE HYDROCHLORIDE 80 MG: 40 CAPSULE ORAL at 09:10

## 2022-01-11 RX ADMIN — ATORVASTATIN CALCIUM 40 MG: 40 TABLET, FILM COATED ORAL at 17:18

## 2022-01-11 RX ADMIN — ACETAMINOPHEN 975 MG: 325 TABLET, FILM COATED ORAL at 05:20

## 2022-01-11 NOTE — UTILIZATION REVIEW
Inpatient Admission Authorization Request   NOTIFICATION OF INPATIENT ADMISSION/INPATIENT AUTHORIZATION REQUEST   Rn is working on review will send review once completed     SERVICING FACILITY:   [facility]  Address: [address] [place] [address], [address] [place] [id number]  Tax ID: [id number]  NPI: [id number]  Place of Service: Inpatient [address] [facility] Code: 24     ATTENDING PROVIDER:  Attending Name and NPI#: Pierce Harsh [[id number]]  Address: [address] [place] [address], [address] [place] [id number]  Phone: [phone]     UTILIZATION REVIEW CONTACT:  [facility] Utilization   Network Utilization Review Department  Phone: [phone]  Fax: [phone]  Email: [email or web address]     PHYSICIAN ADVISORY SERVICES:  FOR [facility] REVIEW - MEDICAL NECESSITY DENIAL  Phone: [phone]  Fax: [phone]  Email: [email or web address]     TYPE OF REQUEST:  Inpatient Status     ADMISSION INFORMATION:  ADMISSION DATE/TIME: 1/10/22 11:54 AM  PATIENT DIAGNOSIS CODE/DESCRIPTION:  Hypokalemia [E87 6]  Vomiting [R11 10]  Anemia [D64 9]  Upper GI bleed [K92 2]  Nausea & vomiting [R11 2]  DISCHARGE DATE/TIME: No discharge date for patient encounter  DISCHARGE DISPOSITION (IF DISCHARGED): Left against medical advice or discontinued care     IMPORTANT INFORMATION:  Please contact the [facility] directly with any questions or concerns regarding this request  Department voicemails are confidential     Send requests for admission clinical reviews, concurrent reviews, approvals, and administrative denials due to lack of clinical to fax [phone]

## 2022-01-11 NOTE — ANESTHESIA POSTPROCEDURE EVALUATION
Post-Op Assessment Note    CV Status:  Stable  Pain Score: 0    Pain management: adequate     Mental Status:  Awake and somnolent   Hydration Status:  Stable   PONV Controlled:  None   Airway Patency:  Patent      Post Op Vitals Reviewed: Yes      Staff: CRNA         No complications documented      BP      Temp      Pulse     Resp      SpO2

## 2022-01-11 NOTE — PROGRESS NOTES
[address] [facility]  Progress Note - Praneeth Sor 1960, 64 y o  female MRN: [id number]  Unit/Bed#: [address] Encounter: [id number]  Primary Care Provider: Heydi Holguin MD   Date and time admitted to hospital: 1/10/2022  7:20 AM    * Upper GI bleed  Assessment & Plan  Vomiting for 3 weeks, last week had episodes of dark like black or brown vomiting, now again clearer  Reported hematemesis, EGD showed class D esophagitis   Last EGD in 9/2021 -> barretts, hiatal hernia repair looked intact  HB on admission 7 9; recent BL 10-11  H+H every 8 hours, transfuse <7 5  No aspirin or NSAID use  Hold anticoagulants/antiplatelets  PPI IV BID      Nausea and vomiting  Assessment & Plan  As above    Symptomatic anemia  Assessment & Plan  Hb 8 0, suspect from upper GI bleed  BL 10-11  Monitor H&H q 8hr, if drops < 7 5 will transfuse  Consent signed & in chart  Hold DVT Px    Rash  Assessment & Plan  Unclear etiology  Denies bed bugs  Currently some on back -> small papules with small fluid filled blister, neck has red papular rash, rash on legs is very excoritaed to determine underlying rash  since 2 months, uses kenalog at home  Continue benadryl cream, switch to kenalog as patient states she has a reaction to nystatin    Hyponatremia  Assessment & Plan  Suspect hypovolemic hyponatremia, possibly chronic component of SIADH, on SSRI  H/o same in past also  For now will give slow IVF, improved this morning    Gastroesophageal reflux disease with esophagitis without hemorrhage  Assessment & Plan  Continue IV protonix BID   Not on pepcid or carafate    Hypokalemia  Assessment & Plan  Suspect from vomiting  Received 60 in ED, given an additional 40 meq yesterday, today K 3 7    Schizoaffective disorder ([address] )  Assessment & Plan  Cont seroquel, geodon    Hiatal hernia with gastroesophageal reflux disease and esophagitis  Assessment & Plan  H/o repair    ABIMAEL (generalized anxiety disorder)  Assessment & Plan  Cont paxil, prn ativan  PDMP reviewed - ativan last filled 21    Acquired hypothyroidism  Assessment & Plan  Cont levothyroxine      VTE Pharmacologic Prophylaxis:   Hold due to upper GI bleed    Patient Centered Rounds: I performed bedside rounds with nursing staff today  Discussions with Specialists or Other Care Team Provider:     Education and Discussions with Family / Patient: Updated  (mother) via phone  Time Spent for Care: 30 minutes  More than 50% of total time spent on counseling and coordination of care as described above  Current Length of Stay: 1 day(s)  Current Patient Status: Inpatient   Certification Statement: The patient will continue to require additional inpatient hospital stay due to monitoring hemoglobin , symptoms  Discharge Plan: Anticipate discharge in 24-48 hrs to home  Code Status: Level 1 - Full Code    Subjective:   Pt states that she feels very inflamed and itchy due to nystatin cream  She states that she uses kenalog at home  She also states she has a headache which is helped with tylenol  Otherwise, she denies any cough, hematemesis, hematuria, hematochezia, melena  She denies any lightheadedness, chest pain, SOB  Objective:     Vitals:   Temp (24hrs), Av 6 °F (37 °C), Min:97 5 °F (36 4 °C), Max:99 1 °F (37 3 °C)    Temp:  [97 5 °F (36 4 °C)-99 1 °F (37 3 °C)] 97 5 °F (36 4 °C)  HR:  [] 91  Resp:  [14-20] 18  BP: (112-158)/(70-85) 130/72  SpO2:  [96 %-98 %] 98 %  There is no height or weight on file to calculate BMI  Input and Output Summary (last 24 hours): Intake/Output Summary (Last 24 hours) at 2022 1155  Last data filed at 2022 1122  Gross per 24 hour   Intake 250 ml   Output --   Net 250 ml       Physical Exam:   Physical Exam  Vitals and nursing note reviewed  Constitutional:       General: She is not in acute distress  Appearance: She is well-developed     HENT:      Head: Normocephalic and atraumatic  Eyes:      Conjunctiva/sclera: Conjunctivae normal    Cardiovascular:      Rate and Rhythm: Normal rate and regular rhythm  Heart sounds: No murmur heard  Pulmonary:      Effort: Pulmonary effort is normal  No respiratory distress  Breath sounds: Normal breath sounds  Abdominal:      Palpations: Abdomen is soft  Tenderness: There is no abdominal tenderness  Musculoskeletal:      Cervical back: Neck supple  Skin:     General: Skin is warm and dry  Comments: Rash present   Neurological:      Mental Status: She is alert  Additional Data:     Labs:  Results from last 7 days   Lab Units 01/11/22  0512 01/10/22  2200 01/10/22  0844   WBC Thousand/uL  --   --  3 35*   HEMOGLOBIN g/dL 8 0*   < > 7 9*   HEMATOCRIT % 27 3*   < > 25 8*   PLATELETS Thousands/uL  --   --  283   NEUTROS PCT %  --   --  69   LYMPHS PCT %  --   --  16   MONOS PCT %  --   --  9   EOS PCT %  --   --  5    < > = values in this interval not displayed  Results from last 7 days   Lab Units 01/11/22  0512 01/10/22  0844 01/10/22  0844   SODIUM mmol/L 135*   < > 130*   POTASSIUM mmol/L 3 7   < > 2 7*   CHLORIDE mmol/L 101   < > 94*   CO2 mmol/L 28   < > 28   BUN mg/dL 3*   < > 3*   CREATININE mg/dL 0 67   < > 0 71   ANION GAP mmol/L 6   < > 8   CALCIUM mg/dL 8 3   < > 8 7   ALBUMIN g/dL  --   --  3 1*   TOTAL BILIRUBIN mg/dL  --   --  0 41   ALK PHOS U/L  --   --  81   ALT U/L  --   --  33   AST U/L  --   --  35   GLUCOSE RANDOM mg/dL 79   < > 91    < > = values in this interval not displayed       Results from last 7 days   Lab Units 01/10/22  0844   INR  0 92                   Lines/Drains:  Invasive Devices  Report      Peripheral Intravenous Line              Peripheral IV 01/10/22 Left Antecubital 1 day    Peripheral IV 01/10/22 Right Hand 1 day    Peripheral IV 01/10/22 Left Hand <1 day                          Imaging: Reviewed radiology reports from this admission including: abdominal/pelvic CT    Recent Cultures (last 7 days):         Last 24 Hours Medication List:   Current Facility-Administered Medications   Medication Dose Route Frequency Provider Last Rate    acetaminophen  975 mg Oral Q8H Royal Mays MD      atorvastatin  40 mg Oral QPM Sara Jordan MD      diphenhydrAMINE-zinc acetate   Topical TID Keren Azul MD      ferrous sulfate  325 mg Oral BID With Meals Sara Jordan MD      levothyroxine  25 mcg Oral Early Morning Keren Azul MD      LORazepam  2 mg Oral Q6H PRN Sara Jordan MD      multi-electrolyte  50 mL/hr Intravenous Continuous Sara Jordan MD 50 mL/hr (01/10/22 1326)    ondansetron  4 mg Intravenous Q6H PRN Keren Azul MD      pantoprazole  40 mg Intravenous Q12H [facility] Keren Azul MD      PARoxetine  60 mg Oral Daily Sara Jordan MD      QUEtiapine  400 mg Oral HS Keren Azul MD      triamcinolone   Topical BID Dagmar Mcgrath PA-C      ziprasidone  80 mg Oral Daily Sara Jordan MD          Today, Patient Was Seen By: Beth Miller PA-C    **Please Note: This note may have been constructed using a voice recognition system  **

## 2022-01-11 NOTE — PHYSICAL THERAPY NOTE
PHYSICAL THERAPY NOTE          Patient Name: Tameka Oliver  ODVTG'Z Date: 1/11/2022 01/11/22 1427   PT Last Visit   PT Visit Date 01/11/22   Note Type   Note type Evaluation   Pain Assessment   Pain Assessment Tool 0-10   Pain Score No Pain   Restrictions/Precautions   Weight Bearing Precautions Per Order No   Other Precautions Multiple lines; Fall Risk   Home Living   Type of [address] One level; Laundry in basement;Performs ADLs on one level; Able to live on main level with bedroom/bathroom  (2 FAMILIA )   Bathroom Shower/Tub Tub/shower unit   Bathroom Toilet Raised   Bathroom Equipment Grab bars in shower   Home Equipment Walker;Quad cane  (did not use PTA )   Prior Function   Level of [place] Independent with ADLs and functional mobility   Lives With Family  (mother )   Receives Help From Family; [address] in the last 6 months 0   Vocational Retired   Comments + drive    General   Family/Caregiver Present No   Cognition   Overall Cognitive Status WFL   Arousal/Participation Cooperative   Orientation Level Oriented X4   Memory Within functional limits   Following Commands Follows all commands and directions without difficulty   Comments Pt pleasant and cooperative to participate in therapy session    RLE Assessment   RLE Assessment WFL   LLE Assessment   LLE Assessment WFL   Bed Mobility   Supine to Sit 6  Modified independent   Additional items HOB elevated   Sit to Supine Unable to assess   Additional Comments pt supine in bed upon arrival  pt sitting upright in bedside chair with all needs within reach at the end of therapy session    Transfers   Sit to Stand 7  Independent   Stand to Sit 7  Independent   Additional Comments transfers without AD    Ambulation/Elevation   Gait pattern Excessively slow; Short stride   Gait Assistance 5  Supervision Assistive Device None   Distance 2 x 100ft    Stair Management Assistance 5  Supervision   Stair Management Technique One rail R;Alternating pattern; Foreward;Reciprocal   Number of Stairs 3  (limited by lines- no questions/ concerns )   Balance   Static Sitting Good   Dynamic Sitting Good   Static Standing Fair   Dynamic Standing Fair   Ambulatory Fair   Activity Tolerance   Activity Tolerance Patient tolerated treatment well   Medical Staff Made Aware OT; co-eval performed this date 2* increased medical complexity and multiple co-morbidities    Nurse Made Aware RN cleared pt to particiapte in therapy session    Assessment   Prognosis Good   Assessment Pt seen for mod complexity PT evaluation  Pt with active PT eval/treat and up and OOB as tolerated orders  Pt is a 64 y o  female who was admitted to [facility] on 1/10/2021 with upper GI bleed  Pt's active problems include: hypothyroidism, schizoaffective disorder, ABIMAEL, hypokalemia, HTN, GERD, hyponatremia, anemia, rash, N/V  Pt  has a past medical history of Anxiety, Arthritis, Back pain at L4-L5 level, Schreiber esophagus, Bulimia, Colon polyp, Disease of thyroid gland, Ear problems, GERD (gastroesophageal reflux disease), History of transfusion, Hyperlipidemia, Hypothyroidism, Leukopenia, Nasal congestion, NMS (neuroleptic malignant syndrome) (01/03/2020), Pneumonia, Rheumatoid arthritis involving right hip ([address] ), Sciatica, Seizure ([address] ), Seizures ([address] ), Synovial cyst of lumbar spine, Vertigo, and Weight gain  Pt resides with mother in [facility] with 3 FAMILIA and was independent without AD prior to hospital admission  Currently upon evaluation pt performing bed mobility tasks at mod I level, funational transfers at I level and ambulation at S level without AD  Pt negotiated 3 steps at S level using HR  Pt was left sitting upright in bedside chair at the end of PT session with all needs in reach   Pt with no questions or concerns regarding d/c home; pt with no further acute inpatient PT needs at this time- please re-consult if needed  PT to continue to follow pt and recommends home  The patient's AM-PAC Basic Mobility Inpatient Short Form Raw Score is 22  A Raw score of greater than 16 suggests the patient may benefit from discharge to home  Please also refer to the recommendation of the Physical Therapist for safe discharge planning  Goals   Patient Goals to go home    Plan   Treatment/Interventions ADL retraining;Functional transfer training;LE strengthening/ROM; Elevations; Endurance training;Patient/family training;Bed mobility;Gait training;Spoke to nursing;Spoke to case management;OT   PT Frequency   (eval only- d/c pT )   Recommendation   PT Discharge Recommendation No rehabilitation needs   AM-PAC Basic Mobility Inpatient   Turning in Bed Without Bedrails 4   Lying on Back to Sitting on Edge of Flat Bed 4   Moving Bed to Chair 4   Standing Up From Chair 4   Walk in Room 3   Climb 3-5 Stairs 3   Basic Mobility Inpatient Raw Score 22   Basic Mobility Standardized Score 47 4   Highest Level Of Mobility   JH-HLM Goal 7: Walk 25 feet or more   JH-HLM Highest Level of Mobility 8: Walk 250 feet ot more   JH-HLM Goal Achieved Yes   End of Consult   Patient Position at End of Consult Bedside chair; All needs within reach     Scottie Joya, PT, DPT

## 2022-01-11 NOTE — UTILIZATION REVIEW
Inpatient Admission Authorization Request   NOTIFICATION OF INPATIENT ADMISSION/INPATIENT AUTHORIZATION REQUEST   SERVICING FACILITY:   [facility]  Address: [address] [place] [address]  Tax ID: [id number]  NPI: [id number]  Place of Service: Inpatient [address] [facility]  [address]  Place of Service Code: 24     ATTENDING PROVIDER:  Attending Name and NPI#: Noe Chirinos [[id number]]  Address: [address] [place] [address]  Phone: [phone]     UTILIZATION REVIEW CONTACT:  Pavithra Jaime, Utilization   Network Utilization Review Department  Phone: [phone]  Fax: [phone]  Email: [email or web address]  [email or web address]     PHYSICIAN ADVISORY SERVICES:  FOR JRAE-HR-RQKI REVIEW - MEDICAL NECESSITY DENIAL  Phone: [phone]  Fax: [phone]  Email: [email or web address]     TYPE OF REQUEST:  Inpatient Status     ADMISSION INFORMATION:  ADMISSION DATE/TIME: 1/10/22 11:54 AM  PATIENT DIAGNOSIS CODE/DESCRIPTION:  Hypokalemia [E87 6]  Vomiting [R11 10]  Anemia [D64 9]  Upper GI bleed [K92 2]  Nausea & vomiting [R11 2]  DISCHARGE DATE/TIME: No discharge date for patient encounter  DISCHARGE DISPOSITION (IF DISCHARGED): Left against medical advice or discontinued care     IMPORTANT INFORMATION:  Please contact the Pavithra Jaime directly with any questions or concerns regarding this request  Department voicemails are confidential     Send requests for admission clinical reviews, concurrent reviews, approvals, and administrative denials due to lack of clinical to fax [phone]

## 2022-01-11 NOTE — PLAN OF CARE
Problem: Potential for Falls  Goal: Patient will remain free of falls  Description: INTERVENTIONS:  - Educate patient/family on patient safety including physical limitations  - Instruct patient to call for assistance with activity   - Consult OT/PT to assist with strengthening/mobility   - Keep Call bell within reach  - Keep bed low and locked with side rails adjusted as appropriate  - Keep care items and personal belongings within reach  - Initiate and maintain comfort rounds  - Make Fall Risk Sign visible to staff  - Offer Toileting every  Hours, in advance of need  - Initiate/Maintain alarm  - Obtain necessary fall risk management equipment:  - Apply yellow socks and bracelet for high fall risk patients  - Consider moving patient to room near nurses station  Outcome: Progressing     Problem: Prexisting or High Potential for Compromised Skin Integrity  Goal: Skin integrity is maintained or improved  Description: INTERVENTIONS:  - Identify patients at risk for skin breakdown  - Assess and monitor skin integrity  - Assess and monitor nutrition and hydration status  - Monitor labs   - Assess for incontinence   - Turn and reposition patient  - Assist with mobility/ambulation  - Relieve pressure over bony prominences  - Avoid friction and shearing  - Provide appropriate hygiene as needed including keeping skin clean and dry  - Evaluate need for skin moisturizer/barrier cream  - Collaborate with interdisciplinary team   - Patient/family teaching  - Consider wound care consult   Outcome: Progressing

## 2022-01-11 NOTE — OCCUPATIONAL THERAPY NOTE
Occupational Therapy Evaluation     Patient Name: Sharyle June  NBKTA'K Date: 1/11/2022  Problem List  Principal Problem:    Upper GI bleed  Active Problems:    Acquired hypothyroidism    Schizoaffective disorder (HCC)    ABIMAEL (generalized anxiety disorder)    Essential hypertension    Hypokalemia    Gastroesophageal reflux disease with esophagitis without hemorrhage    Hyponatremia    Symptomatic anemia    Rash    Hiatal hernia with gastroesophageal reflux disease and esophagitis    Nausea and vomiting    Past Medical History  Past Medical History:   Diagnosis Date    Anxiety     Arthritis     Back pain at L4-L5 level     Schreiber esophagus     Bulimia     Colon polyp     Disease of thyroid gland     hypothyroid    Ear problems     GERD (gastroesophageal reflux disease)     History of transfusion     Hyperlipidemia     Hypothyroidism     Leukopenia     Nasal congestion     NMS (neuroleptic malignant syndrome) 01/03/2020    Pneumonia     Rheumatoid arthritis involving right hip (HCC)     Sciatica     Seizure ([address] )     due to medication mix up 8/2018 only one historically    Seizures ([address] )     Synovial cyst of lumbar spine     Vertigo     Weight gain      Past Surgical History  Past Surgical History:   Procedure Laterality Date    BONE MARROW BIOPSY      BREAST SURGERY      enlargement with implants    CLOSED REDUCTION DISTAL FEMUR FRACTURE      COLONOSCOPY      COSMETIC SURGERY      DENTAL SURGERY      HIP ARTHROPLASTY Right 1/2/2020    Procedure: REVISION TOTAL HIP ARTHROPLASTY WITH REPAIR OF PARIPROSTHETIC FRACTURE - POSTERIOR APPROACH;  Surgeon: Shailesh Shen MD;  [facility];  Service: Orthopedics    AL ESOPHAGOGASTRODUODENOSCOPY TRANSORAL DIAGNOSTIC N/A 5/11/2021    Procedure: ESOPHAGOGASTRODUODENOSCOPY (EGD); Surgeon:  Brisa Campos MD;  [facility];  Service: General    AL ESOPHAGUS SURG PROCEDURE UNLISTED N/A 5/11/2021    Procedure: FUNDOPLICATION TRANSORAL INCISIONLESS;  Surgeon: Abhi Amaya MD;  [facility];  Service: Gastroenterology    OK LAP, REPAIR PARAESOPHAGEAL HERNIA, INCL FUNDOPLASTY W/ MESH N/A 5/11/2021    Procedure: REPAIR HERNIA PARAESOPHAGEAL  LAPAROSCOPIC;  Surgeon: Karen Gamino MD;  [facility];  Service: General    OK TOTAL HIP ARTHROPLASTY Right 12/11/2019    Procedure: ARTHROPLASTY HIP TOTAL ANTERIOR;  Surgeon: Isi Purvis MD;  [facility];  Service: Orthopedics    RHINOPLASTY      SINUS SURGERY      TRANSORAL INCISIONLESS FUNDOPLICATION (TIF)  05/17/4103 01/11/22 1426   OT Last Visit   OT Visit Date 01/11/22   Note Type   Note type Evaluation   Restrictions/Precautions   Weight Bearing Precautions Per Order No   Other Precautions Multiple lines; Fall Risk   Pain Assessment   Pain Assessment Tool 0-10   Pain Score No Pain   Home Living   Type of [address] [place] [address] One level; Laundry in basement  (2STE)   Bathroom Shower/Tub Tub/shower unit   Bathroom Toilet Raised   Bathroom Equipment Grab bars in shower; Shower chair  (pt states SC does not fit in shower)   Home Equipment Walker;Quad cane; Sock aid  (did not use PTA )   Prior Function   Level of [place] Independent with ADLs and functional mobility   Lives With [employer] Help From Family; Neighbor   ADL Assistance Independent   IADLs Independent   Falls in the last 6 months 0   Vocational Retired   Comments Pt reports living with her mother  Assits mother with dressing and IADLs   Supportive neighbor   Lifestyle   Autonomy PTA, pt reports being I with ADLs/IADLs, fnxl mobility, (+)    Reciprocal Relationships Mother, neighbor    Service to Others Retired   Intrinsic Gratification Watching TV (likes Mash and I love Birda Gamma) + hugging her cat named Baby Girl    Psychosocial   Psychosocial (WDL) WDL   ADL   Where Assessed Edge of bed   Eating Assistance 7  Independent   Grooming Assistance 7  Independent   UB Bathing Assistance 7  Independent   LB Bathing Assistance 5  Supervision/Setup   [place] 5  Supervision/Setup   Toileting Assistance  5  Supervision/Setup   Additional Comments S for LLE, MIN A for RLE; however, pt reports she typically uses sock aid for RLE at home    Bed Mobility   Supine to Sit 6  Modified independent   Additional items HOB elevated   Sit to Supine Unable to assess   Transfers   Sit to Stand 7  Independent   Stand to Sit 7  Independent   Additional Comments Transfers w/o AD    Functional Mobility   Functional Mobility 5  Supervision   Balance   Static Sitting Good   Dynamic Sitting Good   Static Standing Fair +   Dynamic Standing Fair   Ambulatory Fair   Activity Tolerance   Activity Tolerance Patient tolerated treatment well   Medical Staff Made Aware PT Neetu Bettencourt   Nurse Made Aware RN clearance for session    RUE Assessment   RUE Assessment WFL   LUE Assessment   LUE Assessment WFL   Hand Function   Gross Motor Coordination Functional   Fine Motor Coordination Functional   Sensation   Light Touch No apparent deficits   Vision-Basic Assessment   Current Vision Wears glasses only for reading   Vision - Complex Assessment   Ocular Range of Motion WFL   Head Position WDL   Tracking Able to track stimulus in all quads without difficulty   Perception   Inattention/Neglect Appears intact   Cognition   Overall Cognitive Status [facility]   Arousal/Participation Alert; Responsive; Cooperative   Attention Within functional limits   Orientation Level Oriented X4   Memory Within functional limits   Following Commands Follows all commands and directions without difficulty   Comments Pt very pleasant and cooperative t/o session    Assessment   Assessment Pt is a 64 y o  female admitted to [facility] on 1/10/2022 w/ Upper GI bleed   S/p EGD   has a past medical history of Anxiety, Arthritis, Back pain at L4-L5 level, Schreiber esophagus, Bulimia, Colon polyp, GERD, Hyperlipidemia, Hypothyroidism, Leukopenia, Nasal congestion, NMS (neuroleptic malignant syndrome), Pneumonia, Rheumatoid arthritis, Sciatica, Seizure, Synovial cyst of lumbar spine, Vertigo  Pt with active OT orders and up and OOB as tolerated orders  Pt seen as a co-evaluation with PT due to the patient's co-morbidities, clinically unstable presentation/clinical complexity, and present impairments  As per pt report, pta, resides with her mother in a 1STH, 2STE  Pt was I w/  ADLS and IADLS, (+) drove  Upon evaluation, pt currently MI/I for transfers and S for mobility  Pt currently requires I eating, I grooming, I UB ADLs, S LB ADLs, and S toileting  From OT standpoint, recommendation would be home with social support PRN  Pt with no concerns regarding fnxl performance and ability to return home  Supportive neighbor who can assist if needed  No further acute OT needs  D/C OT  Please re-consult if needed  Thank you  Pt was left in after session with all current needs met  The patient's raw score on the AM-PAC Daily Activity inpatient short form is 21, standardized score is 44 27, greater than 39 4  Patients at this level are likely to benefit from discharge to home  Please refer to the recommendation of the Occupational Therapist for safe discharge planning     Plan   OT Frequency Eval only   Recommendation   OT Discharge Recommendation No rehabilitation needs  (home with social support)   OT - OK to Discharge Yes  (when medically stable )   AM-PAC Daily Activity Inpatient   Lower Body Dressing 3   Bathing 3   Toileting 3   Upper Body Dressing 4   Grooming 4   Eating 4   Daily Activity Raw Score 21   Daily Activity Standardized Score (Calc for Raw Score >=11) 44 27   AM-PAC Applied Cognition Inpatient   Following a Speech/Presentation 4   Understanding Ordinary Conversation 4   Taking Medications 4   Remembering Where Things Are Placed or Put Away 4   Remembering List of 4-5 Errands 4   Taking Care of Complicated Tasks 4   Applied Cognition Raw Score 24   Applied Cognition Standardized Score 62 21        Philip Gonzalez MS, OTR/L

## 2022-01-11 NOTE — ASSESSMENT & PLAN NOTE
Vomiting for 3 weeks, last week had episodes of dark like black or brown vomiting, now again clearer  · Reported hematemesis, EGD showed class D esophagitis   · Last EGD in 9/2021 -> barretts, hiatal hernia repair looked intact  · HB on admission 7 9; recent BL 10-11  · H+H every 8 hours, transfuse <7 5  · No aspirin or NSAID use  · Hold anticoagulants/antiplatelets  · PPI IV BID

## 2022-01-11 NOTE — UTILIZATION REVIEW
Initial Clinical Review    Admission: Date/Time/Statement:   Admission Orders (From admission, onward)     Ordered        01/10/22 1154  Inpatient Admission  Once                      Orders Placed This Encounter   Procedures    Inpatient Admission     Standing Status:   Standing     Number of Occurrences:   1     Order Specific Question:   Level of Care     Answer:   Med Surg [16]     Order Specific Question:   Estimated length of stay     Answer:   More than 2 Midnights     Order Specific Question:   Certification     Answer:   I certify that inpatient services are medically necessary for this patient for a duration of greater than two midnights  See H&P and MD Progress Notes for additional information about the patient's course of treatment  ED Arrival Information     Expected Arrival Acuity    - 1/10/2022 07:20 Urgent         Means of arrival Escorted by Service Admission type    Walk-In Self Hospitalist Urgent         Arrival complaint    Vomitting         Chief Complaint   Patient presents with    Vomiting     Pt reports coffee ground emesis x3 weeks, vomiting approx 3 times a day; referred to ED by family doc; pt also reports itchy rash all over body for multiple months that pt states is not going away    Rash       Initial Presentation:  65 y/o female with PMhx of schizoaffective disorder, Schreiber's esophagus, hiatal hernia s/p repair who presents to ED from home with episodes of vomiting and concerns about rash according to the patient  States she's been having 2-4 episodes of vomiting/day for past 3 wks with vomitus later turning brown/black  In ED  Hgb 7 9, previous 10-11  Na 130, K 2 7  given 60 mEq in ED  Last EGD in 9/2021 --> barretts, hiatal hernia repair looked intact  Denies asa or NSAID use  ADMIT TO M/S/UNIT with UPPER GI BLEED  Tele monitoring  Clear liquid diet  Npo after MN  Monitor H&H  ? Chronic component of SIADH, on SSRI  Give additional Kcl meq  Gentle IVF's    BMP in AM  Continue home po meds  GI consult  Wound care consulted  GI consult 1/10 -- Vomiting, hematemesis - etiology for her vomiting remains unclear  Plan for EGD tomorrow  Continue clear liquids today, Npo after MN  PPI q12h  Hold any antiplatelets or anticoagulants  Antiemetics prn  Monitor CBC  Date: 1/11   Day 2:     ED Triage Vitals   Temperature Pulse Respirations Blood Pressure SpO2   01/10/22 0725 01/10/22 0725 01/10/22 0725 01/10/22 0725 01/10/22 0725   98 4 °F (36 9 °C) 105 18 (!) 175/84 99 %      Temp Source Heart Rate Source Patient Position - Orthostatic VS BP Location FiO2 (%)   01/10/22 0725 01/10/22 0725 01/10/22 1100 01/10/22 1100 --   Oral Monitor Lying Right arm       Pain Score       01/10/22 1100       No Pain          Wt Readings from Last 1 Encounters:   01/06/22 77 1 kg (170 lb)     Additional Vital Signs:   Date/Time Temp Pulse Resp BP MAP (mmHg) SpO2 O2 Device   01/11/22 1035 98 9 °F (37 2 °C) 90 18 158/85 -- 96 % None (Room air)   01/11/22 07:28:08 99 1 °F (37 3 °C) 102 17 119/74 89 97 % --   01/10/22 2300 98 °F (36 7 °C) 82 20 114/70 85 98 % --   01/10/22 2145 -- -- -- -- -- -- None (Room air)   01/10/22 15:14:52 99 1 °F (37 3 °C) 83 18 112/73 86 97 % --   01/10/22 13:32:21 99 °F (37 2 °C) -- 18 145/85 105 -- --   01/10/22 1100 -- 80 19 143/77 -- 96 % --   01/10/22 1030 -- 86 18 -- -- 96 % --   01/10/22 0725 98 4 °F (36 9 °C) 105 18 175/84 Abnormal  -- 99 % None (Room air)     Pertinent Labs/Diagnostic Test Results:   EKG 1/10 -- Normal sinus rhythm  Possible Lateral infarct , age undetermined  Abnormal ECG    CT a/p 1/10 -- No acute pathology  Cholelithiasis without acute cholecystitis  Hiatal hernia  Massively distended urinary bladder noted       US 1/10 -- pending      Results from last 7 days   Lab Units 01/11/22  0512 01/10/22  2200 01/10/22  0844   WBC Thousand/uL  --   --  3 35*   HEMOGLOBIN g/dL 8 0* 8 6* 7 9*   HEMATOCRIT % 27 3* 28 8* 25 8*   PLATELETS Thousands/uL  -- --  283   NEUTROS ABS Thousands/µL  --   --  2 31     Results from last 7 days   Lab Units 01/11/22  0512 01/10/22  0844   SODIUM mmol/L 135* 130*   POTASSIUM mmol/L 3 7 2 7*   CHLORIDE mmol/L 101 94*   CO2 mmol/L 28 28   ANION GAP mmol/L 6 8   BUN mg/dL 3* 3*   CREATININE mg/dL 0 67 0 71   EGFR ml/min/1 73sq m 95 92   CALCIUM mg/dL 8 3 8 7     Results from last 7 days   Lab Units 01/10/22  0844   AST U/L 35   ALT U/L 33   ALK PHOS U/L 81   TOTAL PROTEIN g/dL 6 6   ALBUMIN g/dL 3 1*   TOTAL BILIRUBIN mg/dL 0 41     Results from last 7 days   Lab Units 01/11/22  0512 01/10/22  0844   GLUCOSE RANDOM mg/dL 79 91     Results from last 7 days   Lab Units 01/10/22  1133 01/10/22  0844   HS TNI 0HR ng/L  --  4   HS TNI 2HR ng/L 3  --    HSTNI D2 ng/L -1  --      Results from last 7 days   Lab Units 01/10/22  0844   PROTIME seconds 12 0   INR  0 92     Results from last 7 days   Lab Units 01/10/22  0844   LIPASE u/L 55*         ED Treatment:   Medication Administration from 01/10/2022 0720 to 01/10/2022 1306       Date/Time Order Dose Route Action     01/10/2022 1104 potassium chloride (K-DUR,KLOR-CON) CR tablet 40 mEq 40 mEq Oral Given     01/10/2022 1104 potassium chloride 20 mEq IVPB (premix) 20 mEq Intravenous New Bag     Past Medical History:   Diagnosis Date    Anxiety     Arthritis     Back pain at L4-L5 level     Schreiber esophagus     Bulimia     Colon polyp     Disease of thyroid gland     hypothyroid    Ear problems     GERD (gastroesophageal reflux disease)     History of transfusion     Hyperlipidemia     Hypothyroidism     Leukopenia     Nasal congestion     NMS (neuroleptic malignant syndrome) 01/03/2020    Pneumonia     Rheumatoid arthritis involving right hip (HCC)     Sciatica     Seizure ([facility] [address] )     due to medication mix up 8/2018 only one historically    Seizures ([facility] [address] )     Synovial cyst of lumbar spine     Vertigo     Weight gain      Present on Admission:   Gastroesophageal reflux disease with esophagitis without hemorrhage   Acquired hypothyroidism   ABIMAEL (generalized anxiety disorder)   Schizoaffective disorder (HCC)   Essential hypertension   Nausea and vomiting   Hiatal hernia with gastroesophageal reflux disease and esophagitis   Symptomatic anemia   Hypokalemia   Hyponatremia   Rash      Admitting Diagnosis: Hypokalemia [E87 6]  Vomiting [R11 10]  Anemia [D64 9]  Upper GI bleed [K92 2]  Nausea & vomiting [R11 2]  Age/Sex: 64 y o  female  Admission Orders:  Scheduled Medications:  acetaminophen, 975 mg, Oral, Q8H [address]  atorvastatin, 40 mg, Oral, QPM  diphenhydrAMINE-zinc acetate, , Topical, TID  ferrous sulfate, 325 mg, Oral, BID With Meals  levothyroxine, 25 mcg, Oral, Early Morning  pantoprazole, 40 mg, Intravenous, Q12H [place]  PARoxetine, 60 mg, Oral, Daily  QUEtiapine, 400 mg, Oral, HS  triamcinolone, , Topical, BID  ziprasidone, 80 mg, Oral, Daily    Continuous IV Infusions:  multi-electrolyte, 50 mL/hr, Intravenous, Continuous    PRN Meds:  LORazepam, 2 mg, Oral, Q6H PRN  ondansetron, 4 mg, Intravenous, Q6H PRN        IP CONSULT TO GASTROENTEROLOGY  IP CONSULT TO CASE MANAGEMENT    Network Utilization Review Department  ATTENTION: Please call with any questions or concerns to [phone] and carefully listen to the prompts so that you are directed to the right person  All voicemails are confidential   [facility] all requests for admission clinical reviews, approved or denied determinations and any other requests to dedicated fax number below belonging to the campus where the patient is receiving treatment   List of dedicated fax numbers for the Facilities:  [address] DENIALS (Administrative/Medical Necessity) [phone]   [address] (Maternity/NICU/Pediatrics) 261 [facility] [place] [address] [phone] 8049 [facility] [phone]     [address] [address] [place] [address] [address] [place] [address] [phone]

## 2022-01-11 NOTE — ASSESSMENT & PLAN NOTE
Suspect hypovolemic hyponatremia, possibly chronic component of SIADH, on SSRI  · H/o same in past also  · For now will give slow IVF, improved this morning

## 2022-01-11 NOTE — ASSESSMENT & PLAN NOTE
Hb 8 0, suspect from upper GI bleed  · BL 10-11  · Monitor H&H q 8hr, if drops < 7 5 will transfuse  · Consent signed & in chart  · Hold DVT Px

## 2022-01-12 ENCOUNTER — TRANSITIONAL CARE MANAGEMENT (OUTPATIENT)
Dept: INTERNAL MEDICINE CLINIC | Facility: CLINIC | Age: 62
End: 2022-01-12

## 2022-01-12 LAB
ANION GAP SERPL CALCULATED.3IONS-SCNC: 3 MMOL/L (ref 4–13)
BASOPHILS # BLD AUTO: 0.03 THOUSANDS/ÂΜL (ref 0–0.1)
BASOPHILS NFR BLD AUTO: 1 % (ref 0–1)
BUN SERPL-MCNC: 8 MG/DL (ref 5–25)
CALCIUM SERPL-MCNC: 9.4 MG/DL (ref 8.3–10.1)
CHLORIDE SERPL-SCNC: 104 MMOL/L (ref 100–108)
CO2 SERPL-SCNC: 30 MMOL/L (ref 21–32)
CREAT SERPL-MCNC: 0.66 MG/DL (ref 0.6–1.3)
EOSINOPHIL # BLD AUTO: 0.17 THOUSAND/ÂΜL (ref 0–0.61)
EOSINOPHIL NFR BLD AUTO: 6 % (ref 0–6)
ERYTHROCYTE [DISTWIDTH] IN BLOOD BY AUTOMATED COUNT: 17.7 % (ref 11.6–15.1)
GFR SERPL CREATININE-BSD FRML MDRD: 95 ML/MIN/1.73SQ M
GLUCOSE SERPL-MCNC: 98 MG/DL (ref 65–140)
HCT VFR BLD AUTO: 26.5 % (ref 34.8–46.1)
HGB BLD-MCNC: 7.7 G/DL (ref 11.5–15.4)
IMM GRANULOCYTES # BLD AUTO: 0.01 THOUSAND/UL (ref 0–0.2)
IMM GRANULOCYTES NFR BLD AUTO: 0 % (ref 0–2)
LYMPHOCYTES # BLD AUTO: 0.72 THOUSANDS/ÂΜL (ref 0.6–4.47)
LYMPHOCYTES NFR BLD AUTO: 24 % (ref 14–44)
MCH RBC QN AUTO: 20.5 PG (ref 26.8–34.3)
MCHC RBC AUTO-ENTMCNC: 29.1 G/DL (ref 31.4–37.4)
MCV RBC AUTO: 71 FL (ref 82–98)
MONOCYTES # BLD AUTO: 0.32 THOUSAND/ÂΜL (ref 0.17–1.22)
MONOCYTES NFR BLD AUTO: 11 % (ref 4–12)
NEUTROPHILS # BLD AUTO: 1.78 THOUSANDS/ÂΜL (ref 1.85–7.62)
NEUTS SEG NFR BLD AUTO: 58 % (ref 43–75)
NRBC BLD AUTO-RTO: 0 /100 WBCS
PLATELET # BLD AUTO: 202 THOUSANDS/UL (ref 149–390)
PMV BLD AUTO: 9.4 FL (ref 8.9–12.7)
POTASSIUM SERPL-SCNC: 3.8 MMOL/L (ref 3.5–5.3)
RBC # BLD AUTO: 3.75 MILLION/UL (ref 3.81–5.12)
SODIUM SERPL-SCNC: 137 MMOL/L (ref 136–145)
WBC # BLD AUTO: 3.03 THOUSAND/UL (ref 4.31–10.16)

## 2022-01-12 PROCEDURE — C9113 INJ PANTOPRAZOLE SODIUM, VIA: HCPCS | Performed by: HOSPITALIST

## 2022-01-12 PROCEDURE — 85025 COMPLETE CBC W/AUTO DIFF WBC: CPT

## 2022-01-12 PROCEDURE — 99239 HOSP IP/OBS DSCHRG MGMT >30: CPT

## 2022-01-12 PROCEDURE — 80048 BASIC METABOLIC PNL TOTAL CA: CPT

## 2022-01-12 RX ORDER — FERROUS SULFATE 325(65) MG
325 TABLET ORAL 2 TIMES DAILY WITH MEALS
Qty: 60 TABLET | Refills: 0 | Status: ON HOLD | OUTPATIENT
Start: 2022-01-12 | End: 2023-04-29 | Stop reason: SDUPTHER

## 2022-01-12 RX ORDER — PANTOPRAZOLE SODIUM 40 MG/1
40 TABLET, DELAYED RELEASE ORAL 2 TIMES DAILY
Qty: 60 TABLET | Refills: 0 | Status: SHIPPED | OUTPATIENT
Start: 2022-01-12 | End: 2022-01-28 | Stop reason: SDUPTHER

## 2022-01-12 RX ADMIN — LEVOTHYROXINE SODIUM 25 MCG: 25 TABLET ORAL at 05:31

## 2022-01-12 RX ADMIN — PANTOPRAZOLE SODIUM 40 MG: 40 INJECTION, POWDER, FOR SOLUTION INTRAVENOUS at 08:57

## 2022-01-12 RX ADMIN — ZIPRASIDONE HYDROCHLORIDE 80 MG: 40 CAPSULE ORAL at 08:58

## 2022-01-12 RX ADMIN — SODIUM CHLORIDE, SODIUM GLUCONATE, SODIUM ACETATE, POTASSIUM CHLORIDE, MAGNESIUM CHLORIDE, SODIUM PHOSPHATE, DIBASIC, AND POTASSIUM PHOSPHATE 50 ML/HR: .53; .5; .37; .037; .03; .012; .00082 INJECTION, SOLUTION INTRAVENOUS at 05:32

## 2022-01-12 RX ADMIN — PAROXETINE HYDROCHLORIDE 60 MG: 20 TABLET, FILM COATED ORAL at 08:57

## 2022-01-12 RX ADMIN — ACETAMINOPHEN 975 MG: 325 TABLET, FILM COATED ORAL at 05:31

## 2022-01-12 RX ADMIN — TRIAMCINOLONE ACETONIDE: 1 CREAM TOPICAL at 08:57

## 2022-01-12 RX ADMIN — FERROUS SULFATE TAB 325 MG (65 MG ELEMENTAL FE) 325 MG: 325 (65 FE) TAB at 08:57

## 2022-01-12 NOTE — PLAN OF CARE
Problem: Potential for Falls  Goal: Patient will remain free of falls  Description: INTERVENTIONS:  - Educate patient/family on patient safety including physical limitations  - Instruct patient to call for assistance with activity   - Consult OT/PT to assist with strengthening/mobility   - Keep Call bell within reach  - Keep bed low and locked with side rails adjusted as appropriate  - Keep care items and personal belongings within reach  - Initiate and maintain comfort rounds  - Make Fall Risk Sign visible to staff  - Offer Toileting every 2 Hours, in advance of need  - Initiate/Maintain alarm  - Obtain necessary fall risk management equipment  - Apply yellow socks and bracelet for high fall risk patients  - Consider moving patient to room near nurses station  Outcome: Progressing     Problem: Prexisting or High Potential for Compromised Skin Integrity  Goal: Skin integrity is maintained or improved  Description: INTERVENTIONS:  - Identify patients at risk for skin breakdown  - Assess and monitor skin integrity  - Assess and monitor nutrition and hydration status  - Monitor labs   - Assess for incontinence   - Turn and reposition patient  - Assist with mobility/ambulation  - Relieve pressure over bony prominences  - Avoid friction and shearing  - Provide appropriate hygiene as needed including keeping skin clean and dry  - Evaluate need for skin moisturizer/barrier cream  - Collaborate with interdisciplinary team   - Patient/family teaching  - Consider wound care consult   Outcome: Progressing     Problem: PAIN - ADULT  Goal: Verbalizes/displays adequate comfort level or baseline comfort level  Description: Interventions:  - Encourage patient to monitor pain and request assistance  - Assess pain using appropriate pain scale  - Administer analgesics based on type and severity of pain and evaluate response  - Implement non-pharmacological measures as appropriate and evaluate response  - Consider cultural and social influences on pain and pain management  - Notify physician/advanced practitioner if interventions unsuccessful or patient reports new pain  Outcome: Progressing     Problem: INFECTION - ADULT  Goal: Absence or prevention of progression during hospitalization  Description: INTERVENTIONS:  - Assess and monitor for signs and symptoms of infection  - Monitor lab/diagnostic results  - Monitor all insertion sites, i e  indwelling lines, tubes, and drains  - Monitor endotracheal if appropriate and nasal secretions for changes in amount and color  - [place] appropriate cooling/warming therapies per order  - Administer medications as ordered  - Instruct and encourage patient and family to use good hand hygiene technique  - Identify and instruct in appropriate isolation precautions for identified infection/condition  Outcome: Progressing  Goal: Absence of fever/infection during neutropenic period  Description: INTERVENTIONS:  - Monitor WBC    Outcome: Progressing     Problem: SAFETY ADULT  Goal: Maintain or return to baseline ADL function  Description: INTERVENTIONS:  -  Assess patient's ability to carry out ADLs; assess patient's baseline for ADL function and identify physical deficits which impact ability to perform ADLs (bathing, care of mouth/teeth, toileting, grooming, dressing, etc )  - Assess/evaluate cause of self-care deficits   - Assess range of motion  - Assess patient's mobility; develop plan if impaired  - Assess patient's need for assistive devices and provide as appropriate  - Encourage maximum independence but intervene and supervise when necessary  - Involve family in performance of ADLs  - Assess for home care needs following discharge   - Consider OT consult to assist with ADL evaluation and planning for discharge  - Provide patient education as appropriate  Outcome: Progressing  Goal: Maintains/Returns to pre admission functional level  Description: INTERVENTIONS:  - Perform BMAT or MOVE assessment daily    - Set and communicate daily mobility goal to care team and patient/family/caregiver  - Collaborate with rehabilitation services on mobility goals if consulted  - Perform Range of Motion  - Reposition patient every 2 hours  - Dangle patient   - Stand patient   - Ambulate patient   - Out of bed to chair   - Out of bed for meals   - Out of bed for toileting  - Record patient progress and toleration of activity level   Outcome: Progressing     Problem: DISCHARGE PLANNING  Goal: Discharge to home or other facility with appropriate resources  Description: INTERVENTIONS:  - Identify barriers to discharge w/patient and caregiver  - Arrange for needed discharge resources and transportation as appropriate  - Identify discharge learning needs (meds, wound care, etc )  - Arrange for interpretive services to assist at discharge as needed  - Refer to Case Management Department for coordinating discharge planning if the patient needs post-hospital services based on physician/advanced practitioner order or complex needs related to functional status, cognitive ability, or social support system  Outcome: Progressing     Problem: Knowledge Deficit  Goal: Patient/family/caregiver demonstrates understanding of disease process, treatment plan, medications, and discharge instructions  Description: Complete learning assessment and assess knowledge base    Interventions:  - Provide teaching at level of understanding  - Provide teaching via preferred learning methods  Outcome: Progressing

## 2022-01-12 NOTE — CASE MANAGEMENT
Case Management Assessment & Discharge Planning Note    Patient name Neymar Leak  Location [facility] 218-44 MRN [id number]  : 1960 Date 2022       Current Admission Date: 1/10/2022  Current Admission Diagnosis:Upper GI bleed   Patient Active Problem List    Diagnosis Date Noted    Upper GI bleed 01/10/2022    Nausea and vomiting 10/22/2021    S/P tooth extraction 10/22/2021    Hyperlipidemia 2021    Word finding difficulty 2021    Hiatal hernia with gastroesophageal reflux disease and esophagitis 2021    Polyp of colon 2021    Melena 2020    Cellulitis of left upper extremity 2020    Erosive esophagitis 2020    Rash 2020    Symptomatic anemia 2020    Multiple excoriations 2020    Acute non-recurrent maxillary sinusitis 2020    Fever 2020    Medicare annual wellness visit, subsequent 2020    Dizziness 2020    Fall at home 2020    Gastroesophageal reflux disease with esophagitis without hemorrhage 2020    Hyponatremia 2020    Problem related to living arrangement 2020    Hypokalemia     NMS (neuroleptic malignant syndrome) 2020    Iron deficiency anemia due to chronic blood loss 2019    Chronic bilateral low back pain without sciatica 2019    Right hip pain 07/15/2019    Essential hypertension 2019    Elevated BP without diagnosis of hypertension 2019    Dermal hypersensitivity reaction 2018    ABIMAEL (generalized anxiety disorder) 2018    Schizoaffective disorder ([address] ) 2018    Serotonin syndrome 2018    Other fatigue 2018    Spinal stenosis of lumbar region with neurogenic claudication 06/15/2018    Lumbar spondylosis 06/15/2018    T12 compression fracture ([address] ) 06/15/2018    Synovial cyst of lumbar facet joint 06/15/2018    Localized osteoporosis with current pathological fracture with routine healing 05/25/2018    Lumbar radiculopathy 03/19/2018    DDD (degenerative disc disease), lumbar 03/19/2018    Spondylolisthesis at L4-L5 level 03/19/2018    Anxiety 10/31/2016    Acquired hypothyroidism 10/31/2016    Constipation 04/10/2014    Bulimia nervosa in remission 03/19/2014      LOS (days): 2  Geometric Mean LOS (GMLOS) (days):   Days to GMLOS:     OBJECTIVE:    Risk of Unplanned Readmission Score: 16         Current admission status: Inpatient       Preferred Pharmacy:   [facility], [address] [place] [address] Brayan [address] [address]  Phone: [phone] Fax: [phone]    Primary Care Provider: Nickie Phoenix, MD    Primary Insurance: [address] [place], [place] [address]Th [facility]  Secondary Insurance: Rhona Forman    ASSESSMENT:  [address] [facility]   Primary Phone: [phone] (Mobile)  Home Phone: [phone]               Patient Information  Admitted from[de-identified] Home  Mental Status: Alert  During Assessment patient was accompanied by: Not accompanied during assessment  Assessment information provided by[de-identified] Patient  Primary Caregiver: Self  Support Systems: [facility]: [address] [place] [address] do you live in?: [facility] entry access options   Select all that apply : Stairs  Number of steps to enter home : 1  Type of Current Residence: [facility]  Living Arrangements: Lives w/ Parent(s)    Activities of Daily Living Prior to Admission  Functional Status: Independent  Completes ADLs independently?: Yes  Does patient use assisted devices?: No  Does patient currently own DME?: Yes  What DME does the patient currently own?: [employer]  Does patient have a history of Outpatient Therapy (PT/OT)?: No  Does the patient have a history of Short-Term Rehab?: Yes (Unsure of the name)  Does patient have a history of HHC?: Yes ([facility])  Does patient currently have [address]?: No    Patient Information Continued  Income Source: Pension/penitentiary  Does patient receive dialysis treatments?: No  Does patient have a history of substance abuse?: No  Does patient have a history of Mental Health Diagnosis?: Yes (Schizoaffective disorder and ABIMAEL)  Has patient received inpatient treatment related to mental health in the last 2 years?: No    Means of Transportation  Means of Transport to Appts[de-identified] Drives Self        DISCHARGE DETAILS:    Discharge planning discussed with[de-identified] Pt  Freedom of Choice: Yes     Were Treatment Team discharge recommendations reviewed with patient/caregiver?: Yes  Did patient/caregiver verbalize understanding of patient care needs?: Yes  Were patient/caregiver advised of the risks associated with not following Treatment Team discharge recommendations?: Yes    [address]         Is the patient interested in [facility] at discharge?: No    Treatment Team Recommendation: Home  Discharge Destination Plan[de-identified] Home  Transport at Discharge : Other (Comment) (Per pt she will transport self home)          Patient/caregiver received discharge checklist   Content reviewed  Patient/caregiver encouraged to participate in discharge plan of care prior to discharge home  CM reviewed d/c planning process including the following: identifying help at home, patient preference for d/c planning needs, Discharge Lounge, [facility] Meds to Bed program, availability of treatment team to discuss questions or concerns patient and/or family may have regarding understanding medications and recognizing signs and symptoms once discharged  CM also encouraged patient to follow up with all recommended appointments after discharge  Patient advised of importance for patient and family to participate in managing patients medical well being

## 2022-01-12 NOTE — ASSESSMENT & PLAN NOTE
Vomiting for 3 weeks, last week had episodes of dark like black or brown vomiting, now again clearer  · Reported hematemesis, EGD showed class D esophagitis   · Last EGD in 9/2021 -> barretts, hiatal hernia repair looked intact  · HB on admission 7 9; recent BL 10-11  · transfuse <7 5  · No aspirin or NSAID use  · Hold anticoagulants/antiplatelets  · PPI po BID

## 2022-01-12 NOTE — PLAN OF CARE
Problem: Prexisting or High Potential for Compromised Skin Integrity  Goal: Skin integrity is maintained or improved  Description: INTERVENTIONS:  - Identify patients at risk for skin breakdown  - Assess and monitor skin integrity  - Assess and monitor nutrition and hydration status  - Monitor labs   - Assess for incontinence   - Turn and reposition patient  - Assist with mobility/ambulation  - Relieve pressure over bony prominences  - Avoid friction and shearing  - Provide appropriate hygiene as needed including keeping skin clean and dry  - Evaluate need for skin moisturizer/barrier cream  - Collaborate with interdisciplinary team   - Patient/family teaching  - Consider wound care consult   1/11/2022 1932 by Teddy Winkler RN  Outcome: Progressing  1/11/2022 1932 by Teddy Winkler, RN  Outcome: Progressing

## 2022-01-12 NOTE — ASSESSMENT & PLAN NOTE
Hb 8 0, suspect from upper GI bleed  · BL 10-11  · CBC 7 7 today but no signs of active bleeding  · Will get CBC outpatient on Monday and f/u with PCP  · Consent signed & in chart  · Hold DVT Px

## 2022-01-12 NOTE — UTILIZATION REVIEW
Continued Stay Review    Date: 1/11                       Current Patient Class: Inpatient Current Level of Care: Med surg    HPI:61 y o  female initially admitted on 1/10    Assessment/Plan: Upper GI Bleed, N/V, Hyponatremia  HB on admission 7 9; recent BL 10-11  H+H every 8 hours, transfuse <7 5  Continue IVFs  Hold anticoagulant/ antiplatelets  Na improved to 135 from 130 yesterday  Continue Iv PPI Bid  Pt feels very inflamed and itchy due to nystatin cream  She states that she uses kenalog at home  She also states she has a headache which is helped with tylenol      Vital Signs:   01/11/22 1128 97 5 °F (36 4 °C) 92 14 122/70 -- 98 % None (Room air) --   01/11/22 1035 98 9 °F (37 2 °C) 90 18 158/85 -- 96 % None (Room air)      Pertinent Labs/Diagnostic Results:       Results from last 7 days   Lab Units 01/12/22  1203 01/11/22  1324 01/11/22  0512 01/10/22  2200 01/10/22  0844   WBC Thousand/uL 3 03*  --   --   --  3 35*   HEMOGLOBIN g/dL 7 7* 8 6* 8 0* 8 6* 7 9*   HEMATOCRIT % 26 5* 29 2* 27 3* 28 8* 25 8*   PLATELETS Thousands/uL 202  --   --   --  283   NEUTROS ABS Thousands/µL 1 78*  --   --   --  2 31         Results from last 7 days   Lab Units 01/11/22  0512 01/10/22  0844   SODIUM mmol/L 135* 130*   POTASSIUM mmol/L 3 7 2 7*   CHLORIDE mmol/L 101 94*   CO2 mmol/L 28 28   ANION GAP mmol/L 6 8   BUN mg/dL 3* 3*   CREATININE mg/dL 0 67 0 71   EGFR ml/min/1 73sq m 95 92   CALCIUM mg/dL 8 3 8 7     Results from last 7 days   Lab Units 01/10/22  0844   AST U/L 35   ALT U/L 33   ALK PHOS U/L 81   TOTAL PROTEIN g/dL 6 6   ALBUMIN g/dL 3 1*   TOTAL BILIRUBIN mg/dL 0 41         Results from last 7 days   Lab Units 01/11/22  0512 01/10/22  0844   GLUCOSE RANDOM mg/dL 79 91       Results from last 7 days   Lab Units 01/10/22  1133 01/10/22  0844   HS TNI 0HR ng/L  --  4   HS TNI 2HR ng/L 3  --    HSTNI D2 ng/L -1  --          Results from last 7 days   Lab Units 01/10/22  0844   PROTIME seconds 12 0   INR  0 92 Results from last 7 days   Lab Units 01/11/22  0512   FERRITIN ng/mL 8         Results from last 7 days   Lab Units 01/10/22  0844   LIPASE u/L 55*       Medications:   Scheduled Medications:  acetaminophen, 975 mg, Oral, Q8H YUMIKO  atorvastatin, 40 mg, Oral, QPM  diphenhydrAMINE-zinc acetate, , Topical, TID  ferrous sulfate, 325 mg, Oral, BID With Meals  levothyroxine, 25 mcg, Oral, Early Morning  pantoprazole, 40 mg, Intravenous, Q12H YUMIKO  PARoxetine, 60 mg, Oral, Daily  QUEtiapine, 400 mg, Oral, HS  triamcinolone, , Topical, BID  ziprasidone, 80 mg, Oral, Daily      Continuous IV Infusions:    multi-electrolyte (PLASMALYTE-A/ISOLYTE-S PH 7 4) IV solution  Rate: 50 mL/hr Dose: 50 mL/hr  Freq: Continuous Route: IV  Indications of Use: IV Hydration  Start: 01/10/22 1315    PRN Meds:  LORazepam, 2 mg, Oral, Q6H PRN  ondansetron, 4 mg, Intravenous, Q6H PRN      Discharge Plan: [facility]    Network Utilization Review Department  ATTENTION: Please call with any questions or concerns to [phone] and carefully listen to the prompts so that you are directed to the right person  All voicemails are confidential   [place] Doing all requests for admission clinical reviews, approved or denied determinations and any other requests to dedicated fax number below belonging to the campus where the patient is receiving treatment   List of dedicated fax numbers for the Facilities:  [address] DENIALS (Administrative/Medical Necessity) [phone]   [address] (Maternity/NICU/Pediatrics) [phone]   [address] [place] [address] [address] [place] [address] [address] [facility] [address] [phone]

## 2022-01-12 NOTE — DISCHARGE SUMMARY
[facility]  Discharge- Amrita Jim 1960, 64 y o  female MRN: [id number]  Unit/Bed#: [address] 924-01 Encounter: [id number]  Primary Care Provider: Corey Saavedra MD   Date and time admitted to hospital: 1/10/2022  7:20 AM    * Upper GI bleed  Assessment & Plan  Vomiting for 3 weeks, last week had episodes of dark like black or brown vomiting, now again clearer  · Reported hematemesis, EGD showed class D esophagitis   · Last EGD in 9/2021 -> barretts, hiatal hernia repair looked intact  · HB on admission 7 9; recent BL 10-11  · transfuse <7 5  · No aspirin or NSAID use  · Hold anticoagulants/antiplatelets  · PPI po BID      Nausea and vomiting  Assessment & Plan  As above    Symptomatic anemia  Assessment & Plan  Hb 8 0, suspect from upper GI bleed  · BL 10-11  · CBC 7 7 today but no signs of active bleeding  · Will get CBC outpatient on Monday and f/u with PCP  · Consent signed & in chart  · Hold DVT Px    Rash  Assessment & Plan  Unclear etiology  · Denies bed bugs  · Currently some on back -> small papules with small fluid filled blister, neck has red papular rash, rash on legs is very excoritaed to determine underlying rash  · since 2 months, uses kenalog at home  · Continue benadryl cream, switch to kenalog as patient states she has a reaction to nystatin    Hyponatremia  Assessment & Plan  Suspect hypovolemic hyponatremia, possibly chronic component of SIADH, on SSRI  · H/o same in past also      Gastroesophageal reflux disease with esophagitis without hemorrhage  Assessment & Plan  · Continue protonix po BID  · Not on pepcid or carafate    Hypokalemia  Assessment & Plan  Suspect from vomiting  · Received 60 in ED, given an additional 40 meq yesterday, today WNL    Schizoaffective disorder ([address] )  Assessment & Plan  Cont seroquel, geodon    Hiatal hernia with gastroesophageal reflux disease and esophagitis  Assessment & Plan  · H/o repair    ABIMAEL (generalized anxiety disorder)  Assessment & Plan  · Cont paxil, prn ativan  · PDMP reviewed - ativan last filled 12/7/21    Acquired hypothyroidism  Assessment & Plan  · Cont levothyroxine        Medical Problems             Resolved Problems  Date Reviewed: 1/12/2022    None              Discharging Physician / Practitioner: Nicky Mcintosh PA-C  PCP: Johana Dukes MD  Admission Date:   Admission Orders (From admission, onward)     Ordered        01/10/22 1154  Inpatient Admission  Once                      Discharge Date: 01/12/22    Consultations During Hospital Stay:  · GI, wound care nurse    Procedures Performed:   · EGD      Significant Findings / Test Results:   · LA Class d esophagitis    Incidental Findings:   · Cholelithiasis    Test Results Pending at Discharge (will require follow up):   · EGD biopsies     Outpatient Tests Requested:  · CBC next Monday    Complications:  None     Reason for Admission: None    Hospital Course:   Hussein Leone is a 64 y o  female patient who originally presented to the hospital on 1/10/2022 due to vomiting for the last 3 weeks, including instances with black and brown color  She also had a rash present on her neck, back, and legs  She was started on medicated creams  She was started on a PPI and her hemoglobin was monitored q8 hours  She underwent an EGD which found esophagitis and biopsies were taken  She is being discharged on ferrous sulfate, PPI, with instruction to get CBC on Monday  She is going to follow up with GI  Please see above list of diagnoses and related plan for additional information  Condition at Discharge: stable    Discharge Day Visit / Exam:   Subjective:  Pt is doing well today  She states she is very tired and didn't sleep well last night  She is very anxious to get home  She states she is voiding okay and has no hematuria/hematochezia/melena  She denies any chest pain, SOB, palpitations, lightheadedness, dizziness, abdominal pain     Vitals: Blood Pressure: 133/79 (01/12/22 0838)  Pulse: 99 (01/12/22 0838)  Temperature: 97 8 °F (36 6 °C) (01/12/22 0838)  Temp Source: Tympanic (01/11/22 1128)  Respirations: 20 (01/12/22 0838)  SpO2: 96 % (01/12/22 0071)  Exam:   Physical Exam  Vitals and nursing note reviewed  Constitutional:       General: She is not in acute distress  Appearance: She is well-developed  HENT:      Head: Normocephalic and atraumatic  Eyes:      Conjunctiva/sclera: Conjunctivae normal    Cardiovascular:      Rate and Rhythm: Normal rate and regular rhythm  Heart sounds: No murmur heard  Pulmonary:      Effort: Pulmonary effort is normal  No respiratory distress  Breath sounds: Normal breath sounds  Abdominal:      Palpations: Abdomen is soft  Tenderness: There is no abdominal tenderness  Musculoskeletal:      Cervical back: Neck supple  Skin:     General: Skin is warm and dry  Comments: Rash present    Neurological:      Mental Status: She is alert  Discussion with Family: Patient declined call to   Discharge instructions/Information to patient and family:   See after visit summary for information provided to patient and family  Provisions for Follow-Up Care:  See after visit summary for information related to follow-up care and any pertinent home health orders  Disposition:   Home    Planned Readmission: None     Discharge Statement:  I spent 35 minutes discharging the patient  This time was spent on the day of discharge  I had direct contact with the patient on the day of discharge  Greater than 50% of the total time was spent examining patient, answering all patient questions, arranging and discussing plan of care with patient as well as directly providing post-discharge instructions  Additional time then spent on discharge activities  Discharge Medications:  See after visit summary for reconciled discharge medications provided to patient and/or family  **Please Note: This note may have been constructed using a voice recognition system**

## 2022-01-12 NOTE — ASSESSMENT & PLAN NOTE
Unclear etiology  · Denies bed bugs  · Currently some on back -> small papules with small fluid filled blister, neck has red papular rash, rash on legs is very excoritaed to determine underlying rash  · since 2 months, uses kenalog at home  · Continue benadryl cream, switch to kenalog as patient states she has a reaction to nystatin

## 2022-01-12 NOTE — ASSESSMENT & PLAN NOTE
Suspect hypovolemic hyponatremia, possibly chronic component of SIADH, on SSRI  · H/o same in past also

## 2022-01-13 ENCOUNTER — TELEPHONE (OUTPATIENT)
Dept: INTERNAL MEDICINE CLINIC | Facility: CLINIC | Age: 62
End: 2022-01-13

## 2022-01-13 ENCOUNTER — TELEMEDICINE (OUTPATIENT)
Dept: INTERNAL MEDICINE CLINIC | Facility: CLINIC | Age: 62
End: 2022-01-13
Payer: MEDICARE

## 2022-01-13 VITALS
OXYGEN SATURATION: 93 % | DIASTOLIC BLOOD PRESSURE: 68 MMHG | RESPIRATION RATE: 16 BRPM | TEMPERATURE: 99.8 F | SYSTOLIC BLOOD PRESSURE: 116 MMHG | HEART RATE: 95 BPM

## 2022-01-13 DIAGNOSIS — R21 RASH: Primary | ICD-10-CM

## 2022-01-13 DIAGNOSIS — R11.2 NAUSEA AND VOMITING, INTRACTABILITY OF VOMITING NOT SPECIFIED, UNSPECIFIED VOMITING TYPE: ICD-10-CM

## 2022-01-13 DIAGNOSIS — K92.2 UPPER GI BLEED: Primary | ICD-10-CM

## 2022-01-13 PROCEDURE — 99495 TRANSJ CARE MGMT MOD F2F 14D: CPT | Performed by: INTERNAL MEDICINE

## 2022-01-13 RX ORDER — TRIAMCINOLONE ACETONIDE 1 MG/G
CREAM TOPICAL 2 TIMES DAILY PRN
Qty: 80 G | Refills: 1 | Status: SHIPPED | OUTPATIENT
Start: 2022-01-13 | End: 2022-01-20 | Stop reason: SDUPTHER

## 2022-01-13 NOTE — PROGRESS NOTES
Virtual TCM Visit:    Verification of patient location:    Patient is located in the following state in which I hold an active license [place]        Assessment/Plan:        Problem List Items Addressed This Visit        Digestive    Nausea and vomiting     Improved         Upper GI bleed - Primary     Continue Protonix, and follow-up GI for pathology results of biopsies  Will check CBC and metabolic profile next week         Relevant Orders    CBC and differential    Comprehensive metabolic panel             Reason for visit is TCM  TCM Call (since 12/13/2021)     Date and time call was made  1/12/2022  3:43 PM    Hospital care reviewed  Records not available        Patient was hospitialized at  [address] [place] [address]        Date of Admission  01/10/22    Date of discharge  01/12/22    Diagnosis  Upper GI bleed    Disposition  Home    Were the patients medications reviewed and updated  No      TCM Call (since 12/13/2021)     Should patient be enrolled in anticoag monitoring? No    I have advised the patient to call PCP with any new or worsening symptoms  Irina Harrison - MA          Encounter provider Aracelis Alston MD       Provider located at [address] [place] [phone]      Recent Visits  Date Type Provider Dept   01/06/22 Telemedicine Aracelis Alston MD [facility] recent visits within past 7 days and meeting all other requirements  Today's Visits  Date Type Provider Dept   01/13/22 Telemedicine Aracelis Alston MD [place] [address]   01/13/22 Telephone Aracelis Alston MD [facility] today's visits and meeting all other requirements  Future Appointments  No visits were found meeting these conditions  Showing future appointments within next 150 days and meeting all other requirements       After connecting through [employer], the patient was identified by name and date of birth   Ethel Lucero was informed that this is a telemedicine visit and that the visit is being conducted through [employer] and patient was informed that this is not a secure, HIPAA-compliant platform  She agrees to proceed     My office door was closed  No one else was in the room  She acknowledged consent and understanding of privacy and security of the video platform  The patient has agreed to participate and understands they can discontinue the visit at any time  Patient is aware this is a billable service  Subjective:     Patient ID: Stephon Conn is a 64 y o  female  I reviewed patient's hospitalization for low hemoglobin and suspected upper GI bleed  Her hemoglobin was monitored and she was not transfused  She had an EGD done, see report for details, she had biopsies done of the esophagus and stomach  She also had Schreiber's esophagus  Since home pt reports she has not had vomiting  Review of Systems   Constitutional: Negative for chills, fatigue and fever  HENT: Negative for congestion, nosebleeds, postnasal drip, sore throat and trouble swallowing  Eyes: Negative for pain  Respiratory: Negative for cough, chest tightness, shortness of breath and wheezing  Cardiovascular: Negative for chest pain, palpitations and leg swelling  Gastrointestinal: Negative for abdominal pain, constipation, diarrhea, nausea and vomiting  Endocrine: Negative for polydipsia and polyuria  Genitourinary: Negative for dysuria, flank pain and hematuria  Musculoskeletal: Negative for arthralgias  Skin: Negative for rash  Neurological: Negative for dizziness, tremors, light-headedness and headaches  Hematological: Does not bruise/bleed easily  Psychiatric/Behavioral: Negative for confusion and dysphoric mood  The patient is not nervous/anxious  Objective: There were no vitals filed for this visit  Physical Exam  Pulmonary:      Effort: Pulmonary effort is normal  No respiratory distress  Neurological:      Mental Status: She is alert  Transitional Care Management Review:  Antonio Cunningham is a 64 y o  female here for TCM follow up  During the TCM phone call patient stated:    TCM Call (since 12/13/2021)     Date and time call was made  1/12/2022  3:43 PM    Hospital care reviewed  Records not available        Patient was hospitialized at  On license of [facility]        Date of Admission  01/10/22    Date of discharge  01/12/22    Diagnosis  Upper GI bleed    Disposition  Home    Were the patients medications reviewed and updated  No      TCM Call (since 12/13/2021)     Should patient be enrolled in anticoag monitoring? No    I have advised the patient to call PCP with any new or worsening symptoms  Maty Castillo MA          I spent 20 minutes with the patient during this visit  Maci Craig MD      VIRTUAL VISIT DISCLAIMER    Antonio Cunningham verbally agrees to participate in [employer]  Pt is aware that [employer] could be limited without vital signs or the ability to perform a full hands-on physical exam  Irene Plascencia understands she or the provider may request at any time to terminate the video visit and request the patient to seek care or treatment in person

## 2022-01-13 NOTE — UTILIZATION REVIEW
Notification of Discharge   This is a Notification of Discharge from our facility [address]  Please be advised that this patient has been discharge from our facility  Below you will find the admission and discharge date and time including the patients disposition  UTILIZATION REVIEW CONTACT:  Yohannes Carter  Utilization   Network Utilization Review Department  Phone: [phone] x carefully listen to the prompts  All voicemails are confidential   Email: [email or web address]     PHYSICIAN ADVISORY SERVICES:  FOR UZBZ-VU-LEFT REVIEW - MEDICAL NECESSITY DENIAL  Phone: [phone]  Fax: [phone]  Email: [email or web address]     PRESENTATION DATE: 1/10/2022  7:20 AM  OBERVATION ADMISSION DATE:   INPATIENT ADMISSION DATE: 1/10/22 11:54 AM   DISCHARGE DATE: 1/12/2022  1:37 PM  DISPOSITION: Home/Self Care Home/Self Care      IMPORTANT INFORMATION:  Send all requests for admission clinical reviews, approved or denied determinations and any other requests to dedicated fax number below belonging to the campus where the patient is receiving treatment   List of dedicated fax numbers:  [address] DENIALS (Administrative/Medical Necessity) [phone]   [address] [place] [address] (Maternity/NICU/Pediatrics) [phone]   [facility] [phone]   [address] [facility] [phone]   [address] [place] [address] [phone]   [facility] [address] [facility] [phone]   [facility]  [phone] [address]  [address] [facility] [address] [facility] [phone]

## 2022-01-13 NOTE — TELEPHONE ENCOUNTER
The patient was in the hospital for a rash  She would like to see you today  I did tell her your schedule is full  The patient also asked if you could prescribe kenalog  She would like two tubes  Please advise   Thank you

## 2022-01-13 NOTE — PATIENT INSTRUCTIONS
Problem List Items Addressed This Visit        Digestive    Nausea and vomiting     Improved         Upper GI bleed - Primary     Continue Protonix, and follow-up GI for pathology results of biopsies    Will check CBC and metabolic profile next week         Relevant Orders    CBC and differential    Comprehensive metabolic panel

## 2022-01-20 ENCOUNTER — TELEPHONE (OUTPATIENT)
Dept: INTERNAL MEDICINE CLINIC | Facility: CLINIC | Age: 62
End: 2022-01-20

## 2022-01-20 ENCOUNTER — TELEPHONE (OUTPATIENT)
Dept: GASTROENTEROLOGY | Facility: CLINIC | Age: 62
End: 2022-01-20

## 2022-01-20 DIAGNOSIS — R21 RASH: ICD-10-CM

## 2022-01-20 RX ORDER — TRIAMCINOLONE ACETONIDE 1 MG/G
CREAM TOPICAL 2 TIMES DAILY PRN
Qty: 160 G | Refills: 1 | Status: SHIPPED | OUTPATIENT
Start: 2022-01-20 | End: 2022-02-01 | Stop reason: SDUPTHER

## 2022-01-20 RX ORDER — TRIAMCINOLONE ACETONIDE 1 MG/G
CREAM TOPICAL 2 TIMES DAILY PRN
Qty: 160 G | Refills: 1 | Status: SHIPPED | OUTPATIENT
Start: 2022-01-20 | End: 2022-01-20 | Stop reason: SDUPTHER

## 2022-01-20 NOTE — TELEPHONE ENCOUNTER
Advised pt, she said she cannot use less and I explained it's her insurance that will not allow her to fill it - told her to call the pharmacy to see if she can pay out of pocket for it with a [employer] card

## 2022-01-20 NOTE — TELEPHONE ENCOUNTER
Pt called - the pharmacy told her she cannot get a refill on the cream to RXd until 1/29  She said she cannot wait that long, she uses it everyday on her entire body  Not sure if there is anything you can do?

## 2022-01-20 NOTE — TELEPHONE ENCOUNTER
Patients GI provider:  Dr Chula Suero    Number to return call: [phone]    Reason for call: Pt calling to receive her results from her EGD      Scheduled procedure/appointment date if applicable: N/A

## 2022-01-21 ENCOUNTER — TELEPHONE (OUTPATIENT)
Dept: GASTROENTEROLOGY | Facility: CLINIC | Age: 62
End: 2022-01-21

## 2022-01-21 NOTE — TELEPHONE ENCOUNTER
Patient states that she is still feeling terrible vomiting started up again she would like to speak with you regarding this

## 2022-01-21 NOTE — TELEPHONE ENCOUNTER
I called and spoke to patient  She states she ate a meatball last night and she vomited  She then states she was vomiting throughout the night  She is taking all of her medications as Dr Mary Kay Ceja recommended yesterday  She has an ov scheduled  She wants to discuss this further with a provider  Please return her call to discuss   Thank you

## 2022-01-28 DIAGNOSIS — K22.10 EROSIVE ESOPHAGITIS: ICD-10-CM

## 2022-01-28 RX ORDER — PANTOPRAZOLE SODIUM 40 MG/1
40 TABLET, DELAYED RELEASE ORAL 2 TIMES DAILY
Qty: 60 TABLET | Refills: 0 | Status: SHIPPED | OUTPATIENT
Start: 2022-01-28 | End: 2022-02-14 | Stop reason: SDUPTHER

## 2022-02-01 ENCOUNTER — OFFICE VISIT (OUTPATIENT)
Dept: INTERNAL MEDICINE CLINIC | Facility: CLINIC | Age: 62
End: 2022-02-01
Payer: MEDICARE

## 2022-02-01 VITALS
OXYGEN SATURATION: 99 % | WEIGHT: 182 LBS | SYSTOLIC BLOOD PRESSURE: 152 MMHG | BODY MASS INDEX: 31.07 KG/M2 | DIASTOLIC BLOOD PRESSURE: 90 MMHG | HEART RATE: 96 BPM | HEIGHT: 64 IN

## 2022-02-01 DIAGNOSIS — F41.9 ANXIETY: ICD-10-CM

## 2022-02-01 DIAGNOSIS — R11.0 NAUSEA: ICD-10-CM

## 2022-02-01 DIAGNOSIS — L29.9 PRURITUS: Primary | ICD-10-CM

## 2022-02-01 DIAGNOSIS — R21 RASH: ICD-10-CM

## 2022-02-01 PROCEDURE — 99214 OFFICE O/P EST MOD 30 MIN: CPT | Performed by: INTERNAL MEDICINE

## 2022-02-01 RX ORDER — TRIAMCINOLONE ACETONIDE 1 MG/G
CREAM TOPICAL 2 TIMES DAILY PRN
Qty: 160 G | Refills: 1 | Status: SHIPPED | OUTPATIENT
Start: 2022-02-01 | End: 2022-03-04 | Stop reason: SDUPTHER

## 2022-02-01 RX ORDER — ONDANSETRON 4 MG/1
4 TABLET, FILM COATED ORAL EVERY 8 HOURS PRN
Qty: 20 TABLET | Refills: 0 | Status: SHIPPED | OUTPATIENT
Start: 2022-02-01 | End: 2022-04-28 | Stop reason: SDUPTHER

## 2022-02-01 NOTE — PATIENT INSTRUCTIONS
Problem List Items Addressed This Visit        Musculoskeletal and Integument    Rash    Relevant Medications    triamcinolone (KENALOG) 0 1 % cream    Pruritus - Primary     Patient does not have the classic distribution for scabies, and her mother does not have itching  No new medications or detergents or lotions  Pt attributes rash to dealing with her mom's urine soaked diapers that she leaves around the house that pt needs to clean up  Pt has been using topical steroid cream   I recommend systemic treatment with something like claritin, and if not enough relief, then we could try a stronger antihistamine like Benadryl, understanding that this could make her drowsy, or something like gabapentin if antihistamines aren't  working  Patient does not look jaundiced         Relevant Medications    triamcinolone (KENALOG) 0 1 % cream    Other Relevant Orders    Ambulatory Referral to Dermatology       Other    Anxiety     Depression Screening Follow-up Plan: Patient's depression screening was positive with a PHQ-2 score of 6  Their PHQ-9 score was 13  Patient assessed for underlying major depression  They have no active suicidal ideations  Brief counseling provided and recommend additional follow-up/re-evaluation next office visit    Follow up psych             Other Visit Diagnoses     Nausea        Relevant Medications    ondansetron (ZOFRAN) 4 mg tablet

## 2022-02-01 NOTE — ASSESSMENT & PLAN NOTE
Depression Screening Follow-up Plan: Patient's depression screening was positive with a PHQ-2 score of 6  Their PHQ-9 score was 13  Patient assessed for underlying major depression  They have no active suicidal ideations  Brief counseling provided and recommend additional follow-up/re-evaluation next office visit    Follow up psych

## 2022-02-01 NOTE — PROGRESS NOTES
Assessment/Plan:    Pruritus  Patient does not have the classic distribution for scabies, and her mother does not have itching  No new medications or detergents or lotions  Pt attributes rash to dealing with her mom's urine soaked diapers that she leaves around the house that pt needs to clean up  Pt has been using topical steroid cream   I recommend systemic treatment with something like claritin, and if not enough relief, then we could try a stronger antihistamine like Benadryl, understanding that this could make her drowsy, or something like gabapentin if antihistamines aren't  working  Patient does not look jaundiced    Anxiety  Depression Screening Follow-up Plan: Patient's depression screening was positive with a PHQ-2 score of 6  Their PHQ-9 score was 13  Patient assessed for underlying major depression  They have no active suicidal ideations  Brief counseling provided and recommend additional follow-up/re-evaluation next office visit  Follow up psych         Diagnoses and all orders for this visit:    Pruritus  -     Ambulatory Referral to Dermatology; Future    Nausea  -     ondansetron (ZOFRAN) 4 mg tablet; Take 1 tablet (4 mg total) by mouth every 8 (eight) hours as needed for nausea or vomiting    Rash  -     triamcinolone (KENALOG) 0 1 % cream; Apply topically 2 (two) times a day as needed for rash    Anxiety          Subjective:      Patient ID: Betzaida Gomes is a 64 y o  female  Pt reports having a rash for about a month  She has itching all over her body  She is using triamcinolone cream which is helping some  Mother lives in same home and it not having itching  The following portions of the patient's history were reviewed and updated as appropriate: allergies, current medications, past family history, past medical history, past social history, past surgical history and problem list     Review of Systems   Gastrointestinal: Negative for abdominal pain and vomiting     Skin: Positive for rash  itching       BMI Counseling: Body mass index is 31 24 kg/m²  The BMI is above normal  Nutrition recommendations include encouraging healthy choices of fruits and vegetables and moderation in carbohydrate intake  Exercise recommendations include exercising 3-5 times per week  Rationale for BMI follow-up plan is due to patient being overweight or obese  Depression Screening and Follow-up Plan: Patient was screened for depression during today's encounter  They screened negative with a PHQ-2 score of 0  Objective:      /90 (BP Location: Left arm, Patient Position: Sitting, Cuff Size: Standard)   Pulse 96   Ht 5' 4" (1 626 m)   Wt 82 6 kg (182 lb)   SpO2 99%   BMI 31 24 kg/m²          Physical Exam  Eyes:      General: No scleral icterus  Skin:     Coloration: Skin is not jaundiced  Comments: Scattered scabs in no particular distribution that looks like they have come from scratching

## 2022-02-01 NOTE — ASSESSMENT & PLAN NOTE
Patient does not have the classic distribution for scabies, and her mother does not have itching  No new medications or detergents or lotions  Pt attributes rash to dealing with her mom's urine soaked diapers that she leaves around the house that pt needs to clean up  Pt has been using topical steroid cream   I recommend systemic treatment with something like claritin, and if not enough relief, then we could try a stronger antihistamine like Benadryl, understanding that this could make her drowsy, or something like gabapentin if antihistamines aren't  working    Patient does not look jaundiced

## 2022-02-03 ENCOUNTER — TELEPHONE (OUTPATIENT)
Dept: INTERNAL MEDICINE CLINIC | Facility: CLINIC | Age: 62
End: 2022-02-03

## 2022-02-03 ENCOUNTER — HOSPITAL ENCOUNTER (EMERGENCY)
Facility: HOSPITAL | Age: 62
Discharge: HOME | End: 2022-02-03
Attending: EMERGENCY MEDICINE
Payer: MEDICARE

## 2022-02-03 VITALS
SYSTOLIC BLOOD PRESSURE: 151 MMHG | RESPIRATION RATE: 17 BRPM | TEMPERATURE: 98.1 F | OXYGEN SATURATION: 96 % | HEART RATE: 90 BPM | DIASTOLIC BLOOD PRESSURE: 82 MMHG

## 2022-02-03 DIAGNOSIS — R11.0 NAUSEA: ICD-10-CM

## 2022-02-03 DIAGNOSIS — F25.9 SCHIZOAFFECTIVE DISORDER, UNSPECIFIED TYPE (HCC): ICD-10-CM

## 2022-02-03 DIAGNOSIS — F41.9 ANXIETY: ICD-10-CM

## 2022-02-03 DIAGNOSIS — R42 DIZZINESS: Primary | ICD-10-CM

## 2022-02-03 LAB
2HR DELTA HS TROPONIN: 0 NG/L
ALBUMIN SERPL BCP-MCNC: 3.4 G/DL (ref 3.5–5)
ALP SERPL-CCNC: 81 U/L (ref 46–116)
ALT SERPL W P-5'-P-CCNC: 27 U/L (ref 12–78)
ANION GAP SERPL CALCULATED.3IONS-SCNC: 6 MMOL/L (ref 4–13)
AST SERPL W P-5'-P-CCNC: 27 U/L (ref 5–45)
BASOPHILS # BLD AUTO: 0.03 THOUSANDS/ΜL (ref 0–0.1)
BASOPHILS NFR BLD AUTO: 1 % (ref 0–1)
BILIRUB SERPL-MCNC: 0.29 MG/DL (ref 0.2–1)
BILIRUB UR QL STRIP: NEGATIVE
BUN SERPL-MCNC: 8 MG/DL (ref 5–25)
CALCIUM ALBUM COR SERPL-MCNC: 11.8 MG/DL (ref 8.3–10.1)
CALCIUM SERPL-MCNC: 11.3 MG/DL (ref 8.3–10.1)
CARDIAC TROPONIN I PNL SERPL HS: 4 NG/L
CARDIAC TROPONIN I PNL SERPL HS: 4 NG/L
CHLORIDE SERPL-SCNC: 96 MMOL/L (ref 100–108)
CLARITY UR: CLEAR
CO2 SERPL-SCNC: 27 MMOL/L (ref 21–32)
COLOR UR: YELLOW
CREAT SERPL-MCNC: 0.63 MG/DL (ref 0.6–1.3)
EOSINOPHIL # BLD AUTO: 0.2 THOUSAND/ΜL (ref 0–0.61)
EOSINOPHIL NFR BLD AUTO: 4 % (ref 0–6)
ERYTHROCYTE [DISTWIDTH] IN BLOOD BY AUTOMATED COUNT: 19.6 % (ref 11.6–15.1)
GFR SERPL CREATININE-BSD FRML MDRD: 97 ML/MIN/1.73SQ M
GLUCOSE SERPL-MCNC: 110 MG/DL (ref 65–140)
GLUCOSE UR STRIP-MCNC: NEGATIVE MG/DL
HCT VFR BLD AUTO: 28.2 % (ref 34.8–46.1)
HGB BLD-MCNC: 8.2 G/DL (ref 11.5–15.4)
HGB UR QL STRIP.AUTO: NEGATIVE
IMM GRANULOCYTES # BLD AUTO: 0.02 THOUSAND/UL (ref 0–0.2)
IMM GRANULOCYTES NFR BLD AUTO: 0 % (ref 0–2)
KETONES UR STRIP-MCNC: NEGATIVE MG/DL
LEUKOCYTE ESTERASE UR QL STRIP: NEGATIVE
LYMPHOCYTES # BLD AUTO: 0.63 THOUSANDS/ΜL (ref 0.6–4.47)
LYMPHOCYTES NFR BLD AUTO: 13 % (ref 14–44)
MCH RBC QN AUTO: 19.6 PG (ref 26.8–34.3)
MCHC RBC AUTO-ENTMCNC: 29.1 G/DL (ref 31.4–37.4)
MCV RBC AUTO: 68 FL (ref 82–98)
MONOCYTES # BLD AUTO: 0.41 THOUSAND/ΜL (ref 0.17–1.22)
MONOCYTES NFR BLD AUTO: 9 % (ref 4–12)
NEUTROPHILS # BLD AUTO: 3.45 THOUSANDS/ΜL (ref 1.85–7.62)
NEUTS SEG NFR BLD AUTO: 73 % (ref 43–75)
NITRITE UR QL STRIP: NEGATIVE
NRBC BLD AUTO-RTO: 0 /100 WBCS
PH UR STRIP.AUTO: 7 [PH] (ref 4.5–8)
PLATELET # BLD AUTO: 325 THOUSANDS/UL (ref 149–390)
PMV BLD AUTO: 8.8 FL (ref 8.9–12.7)
POTASSIUM SERPL-SCNC: 3.3 MMOL/L (ref 3.5–5.3)
PROT SERPL-MCNC: 6.9 G/DL (ref 6.4–8.2)
PROT UR STRIP-MCNC: NEGATIVE MG/DL
RBC # BLD AUTO: 4.18 MILLION/UL (ref 3.81–5.12)
SODIUM SERPL-SCNC: 129 MMOL/L (ref 136–145)
SP GR UR STRIP.AUTO: 1.01 (ref 1–1.03)
TSH SERPL DL<=0.05 MIU/L-ACNC: 2.47 UIU/ML (ref 0.36–3.74)
UROBILINOGEN UR QL STRIP.AUTO: 0.2 E.U./DL
WBC # BLD AUTO: 4.74 THOUSAND/UL (ref 4.31–10.16)

## 2022-02-03 PROCEDURE — 87636 SARSCOV2 & INF A&B AMP PRB: CPT | Performed by: EMERGENCY MEDICINE

## 2022-02-03 PROCEDURE — 93005 ELECTROCARDIOGRAM TRACING: CPT

## 2022-02-03 PROCEDURE — 96361 HYDRATE IV INFUSION ADD-ON: CPT

## 2022-02-03 PROCEDURE — 96374 THER/PROPH/DIAG INJ IV PUSH: CPT

## 2022-02-03 PROCEDURE — 99285 EMERGENCY DEPT VISIT HI MDM: CPT

## 2022-02-03 PROCEDURE — 99284 EMERGENCY DEPT VISIT MOD MDM: CPT | Performed by: EMERGENCY MEDICINE

## 2022-02-03 PROCEDURE — C9113 INJ PANTOPRAZOLE SODIUM, VIA: HCPCS | Performed by: EMERGENCY MEDICINE

## 2022-02-03 PROCEDURE — 84443 ASSAY THYROID STIM HORMONE: CPT | Performed by: EMERGENCY MEDICINE

## 2022-02-03 PROCEDURE — 85025 COMPLETE CBC W/AUTO DIFF WBC: CPT | Performed by: EMERGENCY MEDICINE

## 2022-02-03 PROCEDURE — 80053 COMPREHEN METABOLIC PANEL: CPT | Performed by: EMERGENCY MEDICINE

## 2022-02-03 PROCEDURE — 84484 ASSAY OF TROPONIN QUANT: CPT | Performed by: EMERGENCY MEDICINE

## 2022-02-03 PROCEDURE — 81003 URINALYSIS AUTO W/O SCOPE: CPT

## 2022-02-03 PROCEDURE — 36415 COLL VENOUS BLD VENIPUNCTURE: CPT | Performed by: EMERGENCY MEDICINE

## 2022-02-03 RX ORDER — DIAZEPAM 5 MG/1
5 TABLET ORAL ONCE
Status: COMPLETED | OUTPATIENT
Start: 2022-02-03 | End: 2022-02-03

## 2022-02-03 RX ORDER — ONDANSETRON 2 MG/ML
1 INJECTION INTRAMUSCULAR; INTRAVENOUS ONCE
Status: COMPLETED | OUTPATIENT
Start: 2022-02-03 | End: 2022-02-03

## 2022-02-03 RX ORDER — POTASSIUM CHLORIDE 20 MEQ/1
40 TABLET, EXTENDED RELEASE ORAL ONCE
Status: COMPLETED | OUTPATIENT
Start: 2022-02-03 | End: 2022-02-03

## 2022-02-03 RX ORDER — SUCRALFATE 1 G/1
1 TABLET ORAL ONCE
Status: COMPLETED | OUTPATIENT
Start: 2022-02-03 | End: 2022-02-03

## 2022-02-03 RX ORDER — MIDAZOLAM HYDROCHLORIDE 1 MG/ML
1 INJECTION INTRAMUSCULAR; INTRAVENOUS ONCE
Status: COMPLETED | OUTPATIENT
Start: 2022-02-03 | End: 2022-02-03

## 2022-02-03 RX ORDER — LORAZEPAM 2 MG/1
2 TABLET ORAL EVERY 6 HOURS PRN
Qty: 16 TABLET | Refills: 0 | Status: SHIPPED | OUTPATIENT
Start: 2022-02-03 | End: 2022-02-12 | Stop reason: SDUPTHER

## 2022-02-03 RX ORDER — PANTOPRAZOLE SODIUM 40 MG/1
40 INJECTION, POWDER, FOR SOLUTION INTRAVENOUS ONCE
Status: COMPLETED | OUTPATIENT
Start: 2022-02-03 | End: 2022-02-03

## 2022-02-03 RX ADMIN — DIAZEPAM 5 MG: 5 TABLET ORAL at 19:27

## 2022-02-03 RX ADMIN — PANTOPRAZOLE SODIUM 40 MG: 40 INJECTION, POWDER, FOR SOLUTION INTRAVENOUS at 19:27

## 2022-02-03 RX ADMIN — SODIUM CHLORIDE 500 ML: 0.9 INJECTION, SOLUTION INTRAVENOUS at 21:36

## 2022-02-03 RX ADMIN — POTASSIUM CHLORIDE 40 MEQ: 1500 TABLET, EXTENDED RELEASE ORAL at 21:36

## 2022-02-03 RX ADMIN — SUCRALFATE 1 G: 1 TABLET ORAL at 19:27

## 2022-02-03 NOTE — ED PROVIDER NOTES
History  Chief Complaint   Patient presents with    Dizziness     started today, got 1mg versed and 4mg of zofran from EMS    Nasal Congestion     "i have phlegm in my throat i also need to blow my nose"     60-year-old female history of schizoaffective disorder on Geodon, vertigo prescribed meclizine previously presents with multiple complaints  Per nurse had argument with family member, was upset and called EMS with what they perceived as a panic attack for which they administered versed 1 mg followed by zofran for nausea  Patient does endorse nausea and vomiting 4 times today with clear foamy emesis  Also complains of dizziness as if the room is spinning  She states that is similar to past episodes of vertigo but she did not take her Antivert as prescribed  She states that she is most bothered by her "hair hurting "  She mentions a rash on her back  History of multiple abdominal surgeries  When asked about reported argument at home, she states that her mother at home has dementia and that they argue frequently  Later during interview patient states that she really needs Protonix for upset stomach  Denies SI, HI, hallucinations  ROS  Constitutional: denies fevers, chills, sweats  Eyes: denies eye pain  ENT: denies ear, sinus, throat pain  CV: denies chest pain  Resp: denies cough  GI: denies abdominal pain, diarrhea, bloody or dark stool  : denies difficulty urinating, flank pain  MSK: denies neck or back pain  Neuro: denies headache, new numbness, tingling, weakness  Allergy/Immuno: denies recent illness, known sick contacts    Skin: couple weeks rash  Psych: denies thoughts of self-harm        Triage vital signs reviewed     Physical Exam  Const: alert, no acute distress, non-toxic appearing, no diaphoresis   Appears stated age, well-developed, obese  Eyes: no conjunctival injection, discharge or icterus  Head: normocephalic  Ears: auricles normal   Nose: normal  Mouth/throat: clear, moist, edentulous   Neck: normal ROM, supple and without rigidity   CV: normal rate  Extremities warm and well-perfused   Resp: breathing unlabored, non-stridulous   Abdomen: soft, non-tender, non-distended  During exam a "lump" was palpated  Lifting patient's shirt revealed loose sock underneath  Another random loose sock was found on her person  She states that she did laundry today  MSK: no gross deformities appreciated  Skin: warm and dry with rapid capillary refill  Scattered excoriations at lower extremities  Neuro: CNs II-XII grossly intact  Oriented x 4  Sensation and motor function of extremities grossly intact  No ataxia, dysmetria, dysdiadochokinesia  Normal heel-to-shin  Ambulates without difficulty  Psych: Odd affect, flat          Prior to Admission Medications   Prescriptions Last Dose Informant Patient Reported? Taking?    LORazepam (ATIVAN) 2 mg tablet   No No   Sig: Take 1 tablet (2 mg total) by mouth every 6 (six) hours as needed for anxiety   PARoxetine (PAXIL) 30 mg tablet   No No   Sig: TAKE 2 TABLETS BY MOUTH DAILY   acetaminophen (TYLENOL) 325 mg tablet  Self No No   Sig: Take 3 tablets (975 mg total) by mouth every 8 (eight) hours   ferrous sulfate 325 (65 Fe) mg tablet   No No   Sig: Take 1 tablet (325 mg total) by mouth 2 (two) times a day with meals   levothyroxine 25 mcg tablet  Self No No   Sig: Take 1 tablet (25 mcg total) by mouth daily   ondansetron (ZOFRAN) 4 mg tablet   No No   Sig: Take 1 tablet (4 mg total) by mouth every 8 (eight) hours as needed for nausea or vomiting   pantoprazole (PROTONIX) 40 mg tablet   No No   Sig: Take 1 tablet (40 mg total) by mouth 2 (two) times a day   triamcinolone (KENALOG) 0 1 % cream   No No   Sig: Apply topically 2 (two) times a day as needed for rash   ziprasidone (GEODON) 80 mg capsule   No No   Sig: TAKE 1 CAPSULE BY MOUTH EVERY DAY      Facility-Administered Medications: None       Past Medical History:   Diagnosis Date    Anxiety     Arthritis     Back pain at L4-L5 level     Schreiber esophagus     Bulimia     Colon polyp     Disease of thyroid gland     hypothyroid    Ear problems     GERD (gastroesophageal reflux disease)     History of transfusion     Hyperlipidemia     Hypothyroidism     Leukopenia     Nasal congestion     NMS (neuroleptic malignant syndrome) 01/03/2020    Pneumonia     Rheumatoid arthritis involving right hip (HCC)     Sciatica     Seizure ([facility] [address] )     due to medication mix up 8/2018 only one historically    Seizures ([facility] [address] )     Synovial cyst of lumbar spine     Vertigo     Weight gain        Past Surgical History:   Procedure Laterality Date    BONE MARROW BIOPSY      BREAST SURGERY      enlargement with implants    CLOSED REDUCTION DISTAL FEMUR FRACTURE      COLONOSCOPY      COSMETIC SURGERY      DENTAL SURGERY      HIP ARTHROPLASTY Right 1/2/2020    Procedure: REVISION TOTAL HIP ARTHROPLASTY WITH REPAIR OF PARIPROSTHETIC FRACTURE - POSTERIOR APPROACH;  Surgeon: Chana Vargas MD;  [facility];  Service: Orthopedics    AZ ESOPHAGOGASTRODUODENOSCOPY TRANSORAL DIAGNOSTIC N/A 5/11/2021    Procedure: ESOPHAGOGASTRODUODENOSCOPY (EGD); Surgeon: Chris Khan MD;  [facility];  Service: General    AZ ESOPHAGUS SURG PROCEDURE UNLISTED N/A 5/11/2021    Procedure: FUNDOPLICATION TRANSORAL INCISIONLESS;  Surgeon: Saravanan Llanos MD;  [facility];  Service: Gastroenterology    AZ LAP, REPAIR PARAESOPHAGEAL HERNIA, INCL FUNDOPLASTY W/ MESH N/A 5/11/2021    Procedure: REPAIR HERNIA PARAESOPHAGEAL  LAPAROSCOPIC;  Surgeon:  Chris Khan MD;  [facility];  Service: General    AZ TOTAL HIP ARTHROPLASTY Right 12/11/2019    Procedure: ARTHROPLASTY HIP TOTAL ANTERIOR;  Surgeon: Chana Vargas MD;  [facility];  Service: Orthopedics    RHINOPLASTY      SINUS SURGERY      TRANSORAL INCISIONLESS FUNDOPLICATION (TIF)  82/69/3955       Family History   Problem Relation Age of Onset    Uterine cancer Mother     Esophageal cancer Father     Colon cancer Maternal Grandmother      I have reviewed and agree with the history as documented      E-Cigarette/Vaping    E-Cigarette Use Never User      E-Cigarette/Vaping Substances    Nicotine No     THC No     CBD No     Flavoring No     Other No     Unknown No      Social History     Tobacco Use    Smoking status: Never Smoker    Smokeless tobacco: Never Used   Vaping Use    Vaping Use: Never used   Substance Use Topics    Alcohol use: Not Currently    Drug use: Not Currently        Review of Systems    Physical Exam  ED Triage Vitals [02/03/22 1750]   Temperature Pulse Respirations Blood Pressure SpO2   98 1 °F (36 7 °C) 90 18 166/79 97 %      Temp Source Heart Rate Source Patient Position - Orthostatic VS BP Location FiO2 (%)   Oral Monitor Sitting Right arm --      Pain Score       No Pain             Orthostatic Vital Signs  Vitals:    02/03/22 1750 02/03/22 2130   BP: 166/79 151/82   Pulse: 90 90   Patient Position - Orthostatic VS: Sitting Lying       Physical Exam    ED Medications  Medications   ondansetron (FOR EMS ONLY) (ZOFRAN) 4 mg/2 mL injection 4 mg (0 mg Does not apply Given to EMS 2/3/22 1821)   midazolam (FOR EMS ONLY) (VERSED) 2 mg/2 mL injection 2 mg (0 mg Does not apply Given to EMS 2/3/22 1821)   pantoprazole (PROTONIX) injection 40 mg (40 mg Intravenous Given 2/3/22 1927)   sucralfate (CARAFATE) tablet 1 g (1 g Oral Given 2/3/22 1927)   diazepam (VALIUM) tablet 5 mg (5 mg Oral Given 2/3/22 1927)   sodium chloride 0 9 % bolus 500 mL (0 mL Intravenous Stopped 2/3/22 2228)   potassium chloride (K-DUR,KLOR-CON) CR tablet 40 mEq (40 mEq Oral Given 2/3/22 2136)       Diagnostic Studies  Results Reviewed     Procedure Component Value Units Date/Time    COVID/FLU - 24 hour TAT [[id number]]  (Normal) Collected: 02/03/22 1925    Lab Status: Final result Specimen: Nares from Nose Updated: 02/04/22 1025     SARS-CoV-2 Negative     INFLUENZA A PCR Negative     INFLUENZA B PCR Negative    Narrative:      FOR PEDIATRIC PATIENTS - copy/paste COVID Guidelines URL to browser: [email or web address]    SARS-CoV-2 assay is a Nucleic Acid Amplification assay intended for the  qualitative detection of nucleic acid from SARS-CoV-2 in nasopharyngeal  swabs  Results are for the presumptive identification of SARS-CoV-2 RNA  Positive results are indicative of infection with SARS-CoV-2, the virus  causing COVID-19, but do not rule out bacterial infection or co-infection  with other viruses  Laboratories within the [place] and its  territories are required to report all positive results to the appropriate  public health authorities  Negative results do not preclude SARS-CoV-2  infection and should not be used as the sole basis for treatment or other  patient management decisions  Negative results must be combined with  clinical observations, patient history, and epidemiological information  This test has not been FDA cleared or approved  This test has been authorized by FDA under an Emergency Use Authorization  (EUA)  This test is only authorized for the duration of time the  declaration that circumstances exist justifying the authorization of the  emergency use of an in vitro diagnostic tests for detection of SARS-CoV-2  virus and/or diagnosis of COVID-19 infection under section 564(b)(1) of  the Act, 21 U  S C  385JQU-5(Y)(2), unless the authorization is terminated  or revoked sooner  The test has been validated but independent review by FDA  and CLIA is pending  Test performed using the Roche august 6800 System: This RT-PCR assay  targets ORF1, a region unique to SARS-CoV-2  A conserved region in the  E-gene was chosen for pan-Sarbecovirus detection which includes  SARS-CoV-2        HS Troponin I 2hr [[id number]]  (Normal) Collected: 02/03/22 2035    Lab Status: Final result Specimen: Blood from Arm, Left Updated: 02/03/22 2117     hs TnI 2hr 4 ng/L      Delta 2hr hsTnI 0 ng/L     TSH, 3rd generation with Free T4 reflex [[id number]]  (Normal) Collected: 02/03/22 1840    Lab Status: Final result Specimen: Blood from Arm, Left Updated: 02/03/22 1929     TSH 3RD GENERATON 2 470 uIU/mL     Narrative:      Patients undergoing fluorescein dye angiography may retain small amounts of fluorescein in the body for 48-72 hours post procedure  Samples containing fluorescein can produce falsely depressed TSH values  If the patient had this procedure,a specimen should be resubmitted post fluorescein clearance        Comprehensive metabolic panel [[id number]]  (Abnormal) Collected: 02/03/22 1840    Lab Status: Final result Specimen: Blood from Arm, Left Updated: 02/03/22 1916     Sodium 129 mmol/L      Potassium 3 3 mmol/L      Chloride 96 mmol/L      CO2 27 mmol/L      ANION GAP 6 mmol/L      BUN 8 mg/dL      Creatinine 0 63 mg/dL      Glucose 110 mg/dL      Calcium 11 3 mg/dL      Corrected Calcium 11 8 mg/dL      AST 27 U/L      ALT 27 U/L      Alkaline Phosphatase 81 U/L      Total Protein 6 9 g/dL      Albumin 3 4 g/dL      Total Bilirubin 0 29 mg/dL      eGFR 97 ml/min/1 73sq m     Narrative:      National Kidney Disease Foundation guidelines for Chronic Kidney Disease (CKD):     Stage 1 with normal or high GFR (GFR > 90 mL/min/1 73 square meters)    Stage 2 Mild CKD (GFR = 60-89 mL/min/1 73 square meters)    Stage 3A Moderate CKD (GFR = 45-59 mL/min/1 73 square meters)    Stage 3B Moderate CKD (GFR = 30-44 mL/min/1 73 square meters)    Stage 4 Severe CKD (GFR = 15-29 mL/min/1 73 square meters)    Stage 5 End Stage CKD (GFR <15 mL/min/1 73 square meters)  Note: GFR calculation is accurate only with a steady state creatinine    HS Troponin 0hr (reflex protocol) [[id number]]  (Normal) Collected: 02/03/22 1840    Lab Status: Final result Specimen: Blood from Arm, Left Updated: 02/03/22 1916     hs TnI 0hr 4 ng/L     CBC and differential [[id number]]  (Abnormal) Collected: 02/03/22 1840    Lab Status: Final result Specimen: Blood from Arm, Left Updated: 02/03/22 1916     WBC 4 74 Thousand/uL      RBC 4 18 Million/uL      Hemoglobin 8 2 g/dL      Hematocrit 28 2 %      MCV 68 fL      MCH 19 6 pg      MCHC 29 1 g/dL      RDW 19 6 %      MPV 8 8 fL      Platelets 505 Thousands/uL      nRBC 0 /100 WBCs      Neutrophils Relative 73 %      Immat GRANS % 0 %      Lymphocytes Relative 13 %      Monocytes Relative 9 %      Eosinophils Relative 4 %      Basophils Relative 1 %      Neutrophils Absolute 3 45 Thousands/µL      Immature Grans Absolute 0 02 Thousand/uL      Lymphocytes Absolute 0 63 Thousands/µL      Monocytes Absolute 0 41 Thousand/µL      Eosinophils Absolute 0 20 Thousand/µL      Basophils Absolute 0 03 Thousands/µL     Urine Macroscopic, POC [[id number]] Collected: 02/03/22 1827    Lab Status: Final result Specimen: Urine Updated: 02/03/22 1829     Color, UA Yellow     Clarity, UA Clear     pH, UA 7 0     Leukocytes, UA Negative     Nitrite, UA Negative     Protein, UA Negative mg/dl      Glucose, UA Negative mg/dl      Ketones, UA Negative mg/dl      Urobilinogen, UA 0 2 E U /dl      Bilirubin, UA Negative     Blood, UA Negative     Specific Gravity, UA 1 010    Narrative:      CLINITEK RESULT                 No orders to display         Procedures  Procedures      ED Course                             SBIRT 22yo+      Most Recent Value   SBIRT (24 yo +)    In order to provide better care to our patients, we are screening all of our patients for alcohol and drug use  Would it be okay to ask you these screening questions? Unable to answer at this time Filed at: 02/03/2022 1752                MDM  Number of Diagnoses or Management Options  Dizziness  Nausea  Schizoaffective disorder, unspecified type [facility])  Diagnosis management comments: Patient with various and sundry complaints  Will do cardiac work up  Protonix and carafate  If unremarkable plan to discharge  Mild electrolyte abnormalities  Saline bolus, K+ repletion  PCP follow-up, return precautions discussed  Patient appreciative and in agreement with plan  Disposition  Final diagnoses:   Dizziness   Nausea   Schizoaffective disorder, unspecified type ([facility] [address] )     Time reflects when diagnosis was documented in both MDM as applicable and the Disposition within this note     Time User Action Codes Description Comment    2/3/2022  6:47 PM Dialorri Goddard Add [R42] Dizziness     2/3/2022  6:47 PM Susana FISCHER Add [R11 0] Nausea     2/3/2022  6:47 PM Dialorri Shan Add [F25 9] Schizoaffective disorder, unspecified type [facility])       ED Disposition     ED Disposition Condition Date/Time Comment    Discharge Stable u Feb 3, 2022 10:04 PM Mando Dennis discharge to home/self care              Follow-up Information     Follow up With Specialties Details Why Contact Info Additional Information    Aries Harrison MD Internal Medicine Schedule an appointment as soon as possible for a visit  For follow up regarding your symptoms [address] [place]        [address] [place] [address] Emergency Department Emergency Medicine Go to  If symptoms worsen [address]  [address] [facility] 64 [facility] Emergency Department, [address]          Discharge Medication List as of 2/3/2022 10:12 PM      CONTINUE these medications which have NOT CHANGED    Details   acetaminophen (TYLENOL) 325 mg tablet Take 3 tablets (975 mg total) by mouth every 8 (eight) hours, Starting Fri 5/14/2021, Normal      ferrous sulfate 325 (65 Fe) mg tablet Take 1 tablet (325 mg total) by mouth 2 (two) times a day with meals, Starting Wed 1/12/2022, Normal      levothyroxine 25 mcg tablet Take 1 tablet (25 mcg total) by mouth daily, Starting Wed 5/19/2021, Normal LORazepam (ATIVAN) 2 mg tablet Take 1 tablet (2 mg total) by mouth every 6 (six) hours as needed for anxiety, Starting Thu 2/3/2022, Normal      ondansetron (ZOFRAN) 4 mg tablet Take 1 tablet (4 mg total) by mouth every 8 (eight) hours as needed for nausea or vomiting, Starting Tue 2/1/2022, Normal      pantoprazole (PROTONIX) 40 mg tablet Take 1 tablet (40 mg total) by mouth 2 (two) times a day, Starting Fri 1/28/2022, Normal      PARoxetine (PAXIL) 30 mg tablet TAKE 2 TABLETS BY MOUTH DAILY, Normal      triamcinolone (KENALOG) 0 1 % cream Apply topically 2 (two) times a day as needed for rash, Starting Tue 2/1/2022, Normal      ziprasidone (GEODON) 80 mg capsule TAKE 1 CAPSULE BY MOUTH EVERY DAY, Normal           No discharge procedures on file  PDMP Review       Value Time User    PDMP Reviewed  Yes 2/3/2022  4:26 PM Marielle Adan MD           ED Provider  Attending physically available and evaluated Maryam Lopez  DAMARIS managed the patient along with the ED Attending      Electronically Signed by         Sonya Vides MD  02/05/22 9353

## 2022-02-03 NOTE — ED ATTENDING ATTESTATION
Final Diagnosis:  1  Dizziness    2  Nausea    3  Schizoaffective disorder, unspecified type [facility])      ED Course as of 02/07/22 1055   Thu Feb 03, 2022 2113 Sodium(!): 129       I, Hilarie Fothergill MD, saw and evaluated the patient  All available labs and X-rays were ordered by me or the resident and have been reviewed by myself  I discussed the patient with the resident / non-physician and agree with the resident's / non-physician practitioner's findings and plan as documented in the resident's / non-physician practicitioner's note, except where noted  At this point, I agree with the current assessment done in the ED  I was present during key portions of all procedures performed unless otherwise stated  Chief Complaint   Patient presents with    Dizziness     started today, got 1mg versed and 4mg of zofran from EMS    Nasal Congestion     "i have phlegm in my throat i also need to blow my nose"     This is a 64 y o  female presenting for evaluation of many complaints  Per resident / EMS, the patient   Lives at home with mom (demented)  There was an argument today and so she called EMS  Had to be given zofran/versed b/c of agitation  She is here because she has "hair hurts" and "phlegm in throat and needs to blow her nose "   Feels some dizziness today exactly like previous vertigo episodes, worse with head movements, better staying still  No f/ch  +"vomiting" x4 (phlegm)  She wants protonix or Tums    When I talked to her, she only complains of "hair pain" (grabbing starnds of hair and pointing to it)  +URI symptoms as well for an unclear amount of time       PMH:   has a past medical history of Anxiety, Arthritis, Back pain at L4-L5 level, Schreiber esophagus, Bulimia, Colon polyp, Disease of thyroid gland, Ear problems, GERD (gastroesophageal reflux disease), History of transfusion, Hyperlipidemia, Hypothyroidism, Leukopenia, Nasal congestion, NMS (neuroleptic malignant syndrome) (01/03/2020), Pneumonia, Rheumatoid arthritis involving right hip ([facility] [address] ), Sciatica, Seizure ([facility] [address] ), Seizures ([facility] [address] ), Synovial cyst of lumbar spine, Vertigo, and Weight gain  PSH:   has a past surgical history that includes Rhinoplasty; Bone marrow biopsy; Colonoscopy; pr total hip arthroplasty (Right, 12/11/2019); Hip Arthroplasty (Right, 1/2/2020); Sinus surgery; Cosmetic surgery; Closed reduction distal femur fracture; pr lap, repair paraesophageal hernia, incl fundoplasty w/ mesh (N/A, 5/11/2021); pr esophagogastroduodenoscopy transoral diagnostic (N/A, 5/11/2021); pr esophagus surg procedure unlisted (N/A, 5/11/2021); Transoral incisionless fundoplication (TIF) (11/38/7415); Breast surgery; and Dental surgery  Social:  Social History     Substance and Sexual Activity   Alcohol Use Not Currently     Social History     Tobacco Use   Smoking Status Never Smoker   Smokeless Tobacco Never Used     Social History     Substance and Sexual Activity   Drug Use Not Currently     PE:  Vitals:    02/03/22 1750 02/03/22 2130   BP: 166/79 151/82   BP Location: Right arm Right arm   Pulse: 90 90   Resp: 18 17   Temp: 98 1 °F (36 7 °C)    TempSrc: Oral    SpO2: 97% 96%   General: VSS, NAD, awake, alert  Well-nourished, well-developed  Appears stated age  Head: Normocephalic, atraumatic, nontender  Eyes: PERRL, EOM-I  No diplopia  No hyphema  No subconjunctival hemorrhages  Symmetrical lids  ENTAtraumatic external nose and ears  MMM  No stridor  Normal phonation  No drooling  Base of mouth is soft  No mastoid tenderness  Neck: Symmetric, trachea midline  No JVD  CV: Peripheral pulses +2 throughout  No chest wall tenderness  Lungs:   Unlabored   No retractions  No crepitus  No tachypnea  No paradoxical motion  Abd: +BS, soft, NT/ND    MSK:   FROM   No lower extremity edema  Back:   No CVAT  Skin: Dry, non-specific rash on back  Neuro: AAOx3, GCS 15, CN II-XII grossly intact     Motor grossly intact  Psychiatric/Behavioral: Appropriate mood and affect  EPS / lips moving at times   Exam: deferred  A:  - Unclear  P:  - COVID   - Labs for electrolyte abnormalities    - 13 point ROS was performed and all are normal unless stated in the history above  - Nursing note reviewed  Vitals reviewed  - Orders placed by myself and/or advanced practitioner / resident     - Previous chart was reviewed  - No language barrier    - History obtained from patient  - There are significant limitations to the history obtained  Reasons ROS could not be obtained: psychiatric disease   - Critical care time: Not applicable for this patient       Code Status: Prior  Advance Directive and Living Will:      Power of :    POLST:      Medications   ondansetron (FOR EMS ONLY) (ZOFRAN) 4 mg/2 mL injection 4 mg (0 mg Does not apply Given to EMS 2/3/22 1821)   midazolam (FOR EMS ONLY) (VERSED) 2 mg/2 mL injection 2 mg (0 mg Does not apply Given to EMS 2/3/22 1821)   pantoprazole (PROTONIX) injection 40 mg (40 mg Intravenous Given 2/3/22 1927)   sucralfate (CARAFATE) tablet 1 g (1 g Oral Given 2/3/22 1927)   diazepam (VALIUM) tablet 5 mg (5 mg Oral Given 2/3/22 1927)   sodium chloride 0 9 % bolus 500 mL (0 mL Intravenous Stopped 2/3/22 2228)   potassium chloride (K-DUR,KLOR-CON) CR tablet 40 mEq (40 mEq Oral Given 2/3/22 2136)     No orders to display     Orders Placed This Encounter   Procedures    COVID/FLU - 24 hour TAT    Comprehensive metabolic panel    HS Troponin 0hr (reflex protocol)    CBC and differential    TSH, 3rd generation with Free T4 reflex    HS Troponin I 2hr    EKG RESULTS    ECG 12 lead    ECG 12 lead     Labs Reviewed   COMPREHENSIVE METABOLIC PANEL - Abnormal       Result Value Ref Range Status    Sodium 129 (*) 136 - 145 mmol/L Final    Potassium 3 3 (*) 3 5 - 5 3 mmol/L Final    Chloride 96 (*) 100 - 108 mmol/L Final    CO2 27  21 - 32 mmol/L Final    ANION GAP 6  4 - 13 mmol/L Final    BUN 8  5 - 25 mg/dL Final    Creatinine 0 63  0 60 - 1 30 mg/dL Final    Comment: Standardized to IDMS reference method    Glucose 110  65 - 140 mg/dL Final    Comment: If the patient is fasting, the ADA then defines impaired fasting glucose as > 100 mg/dL and diabetes as > or equal to 123 mg/dL  Specimen collection should occur prior to Sulfasalazine administration due to the potential for falsely depressed results  Specimen collection should occur prior to Sulfapyridine administration due to the potential for falsely elevated results  Calcium 11 3 (*) 8 3 - 10 1 mg/dL Final    Corrected Calcium 11 8 (*) 8 3 - 10 1 mg/dL Final    AST 27  5 - 45 U/L Final    Comment: Specimen collection should occur prior to Sulfasalazine administration due to the potential for falsely depressed results  ALT 27  12 - 78 U/L Final    Comment: Specimen collection should occur prior to Sulfasalazine and/or Sulfapyridine administration due to the potential for falsely depressed results  Alkaline Phosphatase 81  46 - 116 U/L Final    Total Protein 6 9  6 4 - 8 2 g/dL Final    Albumin 3 4 (*) 3 5 - 5 0 g/dL Final    Total Bilirubin 0 29  0 20 - 1 00 mg/dL Final    Comment: Use of this assay is not recommended for patients undergoing treatment with eltrombopag due to the potential for falsely elevated results      eGFR 97  ml/min/1 73sq m Final    Narrative:     [place] guidelines for Chronic Kidney Disease (CKD):     Stage 1 with normal or high GFR (GFR > 90 mL/min/1 73 square meters)    Stage 2 Mild CKD (GFR = 60-89 mL/min/1 73 square meters)    Stage 3A Moderate CKD (GFR = 45-59 mL/min/1 73 square meters)    Stage 3B Moderate CKD (GFR = 30-44 mL/min/1 73 square meters)    Stage 4 Severe CKD (GFR = 15-29 mL/min/1 73 square meters)    Stage 5 End Stage CKD (GFR <15 mL/min/1 73 square meters)  Note: GFR calculation is accurate only with a steady state creatinine   CBC AND DIFFERENTIAL - Abnormal    WBC 4 74  4 31 - 10 16 Thousand/uL Final    RBC 4 18  3 81 - 5 12 Million/uL Final    Hemoglobin 8 2 (*) 11 5 - 15 4 g/dL Final    Hematocrit 28 2 (*) 34 8 - 46 1 % Final    MCV 68 (*) 82 - 98 fL Final    MCH 19 6 (*) 26 8 - 34 3 pg Final    MCHC 29 1 (*) 31 4 - 37 4 g/dL Final    RDW 19 6 (*) 11 6 - 15 1 % Final    MPV 8 8 (*) 8 9 - 12 7 fL Final    Platelets 910  792 - 390 Thousands/uL Final    nRBC 0  /100 WBCs Final    Neutrophils Relative 73  43 - 75 % Final    Immat GRANS % 0  0 - 2 % Final    Lymphocytes Relative 13 (*) 14 - 44 % Final    Monocytes Relative 9  4 - 12 % Final    Eosinophils Relative 4  0 - 6 % Final    Basophils Relative 1  0 - 1 % Final    Neutrophils Absolute 3 45  1 85 - 7 62 Thousands/µL Final    Immature Grans Absolute 0 02  0 00 - 0 20 Thousand/uL Final    Lymphocytes Absolute 0 63  0 60 - 4 47 Thousands/µL Final    Monocytes Absolute 0 41  0 17 - 1 22 Thousand/µL Final    Eosinophils Absolute 0 20  0 00 - 0 61 Thousand/µL Final    Basophils Absolute 0 03  0 00 - 0 10 Thousands/µL Final   HS TROPONIN I 0HR - Normal    hs TnI 0hr 4  "Refer to ACS Flowchart"- see link ng/L Final    Comment:                                              Initial (time 0) result  If >=50 ng/L, Myocardial injury suggested ;  Type of myocardial injury and treatment strategy  to be determined  If 5-49 ng/L, a delta result at 2 hours and or 4 hours will be needed to further evaluate  If <4 ng/L, and chest pain has been >3 hours since onset, patient may qualify for discharge based on the HEART score in the ED  If <5 ng/L and <3hours since onset of chest pain, a delta result at 2 hours will be needed to further evaluate  Second Troponin (time 2 hours)  If calculated delta >= 20 ng/L,  Myocardial injury suggested ; Type of myocardial injury and treatment strategy to be determined  If 5-49 ng/L and the calculated delta is 5-19 ng/L, consult medical service for evaluation    Continue evaluation for ischemia on ecg and other possible etiology and repeat hs troponin at 4 hours  If delta is <5 ng/L at 2 hours, consider discharge based on risk stratification via the HEART score (if in ED), or JERRY risk score in IP/Observation  TSH, 3RD GENERATION WITH FREE T4 REFLEX - Normal    TSH 3RD GENERATON 2 470  0 358 - 3 740 uIU/mL Final    Comment: The recommended reference ranges for TSH during pregnancy are as follows:   First trimester 0 1 to 2 5 uIU/mL   Second trimester  0 2 to 3 0 uIU/mL   Third trimester 0 3 to 3 0 uIU/m    Note: Normal ranges may not apply to patients who are transgender, non-binary, or whose legal sex, sex at birth, and gender identity differ  Narrative:     Patients undergoing fluorescein dye angiography may retain small amounts of fluorescein in the body for 48-72 hours post procedure  Samples containing fluorescein can produce falsely depressed TSH values  If the patient had this procedure,a specimen should be resubmitted post fluorescein clearance  HS TROPONIN I 2HR - Normal    hs TnI 2hr 4  "Refer to ACS Flowchart"- see link ng/L Final    Comment:                                              Initial (time 0) result  If >=50 ng/L, Myocardial injury suggested ;  Type of myocardial injury and treatment strategy  to be determined  If 5-49 ng/L, a delta result at 2 hours and or 4 hours will be needed to further evaluate  If <4 ng/L, and chest pain has been >3 hours since onset, patient may qualify for discharge based on the HEART score in the ED  If <5 ng/L and <3hours since onset of chest pain, a delta result at 2 hours will be needed to further evaluate  Second Troponin (time 2 hours)  If calculated delta >= 20 ng/L,  Myocardial injury suggested ; Type of myocardial injury and treatment strategy to be determined  If 5-49 ng/L and the calculated delta is 5-19 ng/L, consult medical service for evaluation    Continue evaluation for ischemia on ecg and other possible etiology and repeat hs troponin at 4 hours   If delta is <5 ng/L at 2 hours, consider discharge based on risk stratification via the HEART score (if in ED), or JERRY risk score in IP/Observation  Delta 2hr hsTnI 0  <20 ng/L Final   URINE MACROSCOPIC, POC    Color, UA Yellow   Final    Clarity, UA Clear   Final    pH, UA 7 0  4 5 - 8 0 Final    Leukocytes, UA Negative  Negative Final    Nitrite, UA Negative  Negative Final    Protein, UA Negative  Negative mg/dl Final    Glucose, UA Negative  Negative mg/dl Final    Ketones, UA Negative  Negative mg/dl Final    Urobilinogen, UA 0 2  0 2, 1 0 E U /dl E U /dl Final    Bilirubin, UA Negative  Negative Final    Blood, UA Negative  Negative Final    Specific Sharon, UA 1 010  1 003 - 1 030 Final    Narrative:     CLINITEK RESULT     Time reflects when diagnosis was documented in both MDM as applicable and the Disposition within this note       Time User Action Codes Description Comment    2/3/2022  6:47 PM Rannie Bar Add [R42] Dizziness     2/3/2022  6:47 PM Rannie Bar Add [R11 0] Nausea     2/3/2022  6:47 PM Rannie Bar Add [F25 9] Schizoaffective disorder, unspecified type [facility])           ED Disposition       ED Disposition Condition Date/Time Comment    Discharge Stable Thu Feb 3, 2022 10:04 PM Akhil White discharge to home/self care                  Follow-up Information       Follow up With Specialties Details Why Contact Info Additional Information    Eloina Terry MD Internal Medicine Schedule an appointment as soon as possible for a visit  For follow up regarding your symptoms [id number]        [facility] Emergency Department Emergency Medicine Go to  If symptoms worsen [address]  [address] Emergency Department, [address] [place], [facility], [facility] [address]          Discharge Medication List as of 2/3/2022 10:12 PM        CONTINUE these medications which have NOT CHANGED    Details   acetaminophen (TYLENOL) 325 mg tablet Take 3 tablets (975 mg total) by mouth every 8 (eight) hours, Starting Fri 5/14/2021, Normal      ferrous sulfate 325 (65 Fe) mg tablet Take 1 tablet (325 mg total) by mouth 2 (two) times a day with meals, Starting Wed 1/12/2022, Normal      levothyroxine 25 mcg tablet Take 1 tablet (25 mcg total) by mouth daily, Starting Wed 5/19/2021, Normal      LORazepam (ATIVAN) 2 mg tablet Take 1 tablet (2 mg total) by mouth every 6 (six) hours as needed for anxiety, Starting Thu 2/3/2022, Normal      ondansetron (ZOFRAN) 4 mg tablet Take 1 tablet (4 mg total) by mouth every 8 (eight) hours as needed for nausea or vomiting, Starting Tue 2/1/2022, Normal      pantoprazole (PROTONIX) 40 mg tablet Take 1 tablet (40 mg total) by mouth 2 (two) times a day, Starting Fri 1/28/2022, Normal      PARoxetine (PAXIL) 30 mg tablet TAKE 2 TABLETS BY MOUTH DAILY, Normal      triamcinolone (KENALOG) 0 1 % cream Apply topically 2 (two) times a day as needed for rash, Starting Tue 2/1/2022, Normal      ziprasidone (GEODON) 80 mg capsule TAKE 1 CAPSULE BY MOUTH EVERY DAY, Normal           No discharge procedures on file  Prior to Admission Medications   Prescriptions Last Dose Informant Patient Reported? Taking?    LORazepam (ATIVAN) 2 mg tablet   No No   Sig: Take 1 tablet (2 mg total) by mouth every 6 (six) hours as needed for anxiety   PARoxetine (PAXIL) 30 mg tablet   No No   Sig: TAKE 2 TABLETS BY MOUTH DAILY   acetaminophen (TYLENOL) 325 mg tablet  Self No No   Sig: Take 3 tablets (975 mg total) by mouth every 8 (eight) hours   ferrous sulfate 325 (65 Fe) mg tablet   No No   Sig: Take 1 tablet (325 mg total) by mouth 2 (two) times a day with meals   levothyroxine 25 mcg tablet  Self No No   Sig: Take 1 tablet (25 mcg total) by mouth daily   ondansetron (ZOFRAN) 4 mg tablet   No No   Sig: Take 1 tablet (4 mg total) by mouth every 8 (eight) hours as needed for nausea or vomiting   pantoprazole (PROTONIX) 40 mg tablet   No No   Sig: Take 1 tablet (40 mg total) by mouth 2 (two) times a day   triamcinolone (KENALOG) 0 1 % cream   No No   Sig: Apply topically 2 (two) times a day as needed for rash   ziprasidone (GEODON) 80 mg capsule   No No   Sig: TAKE 1 CAPSULE BY MOUTH EVERY DAY      Facility-Administered Medications: None       Portions of the record may have been created with voice recognition software  Occasional wrong word or "sound a like" substitutions may have occurred due to the inherent limitations of voice recognition software  Read the chart carefully and recognize, using context, where substitutions have occurred      Electronically signed by:  Magen Dawn

## 2022-02-03 NOTE — TELEPHONE ENCOUNTER
Pt called to advise she lost her last 3 Ativan pills behind her bed and cannot find them now  She asked if you can send a few pills to the pharmacy until she can get her refill?

## 2022-02-03 NOTE — ED NOTES
Provider at bedside   Pt was able to walk to bathroom with steady gait for urine sample     Koki Agarwal RN  02/03/22 [id number]

## 2022-02-04 LAB
ATRIAL RATE: 91 BPM
FLUAV RNA RESP QL NAA+PROBE: NEGATIVE
FLUBV RNA RESP QL NAA+PROBE: NEGATIVE
P AXIS: 51 DEGREES
PR INTERVAL: 134 MS
QRS AXIS: 21 DEGREES
QRSD INTERVAL: 74 MS
QT INTERVAL: 338 MS
QTC INTERVAL: 415 MS
SARS-COV-2 RNA RESP QL NAA+PROBE: NEGATIVE
T WAVE AXIS: 50 DEGREES
VENTRICULAR RATE: 91 BPM

## 2022-02-04 PROCEDURE — 93010 ELECTROCARDIOGRAM REPORT: CPT | Performed by: INTERNAL MEDICINE

## 2022-02-04 NOTE — CASE MANAGEMENT
Case Management ED Discharge Planning Note    Patient name Heron Tong  Location ED 25/ED 25 MRN [id number]  : 1960 Date 2022        OBJECTIVE:  Predictive Model Details         87% Factor Value    Risk of Hospital Admission or ED Visit Model Number of ED Visits 3     Number of Hospitalizations 4     Has Medicaid Yes     Is in Relationship No     Has Anemia Yes     Has Depression Yes     Has PCP Yes            Chief Complaint: Dizzy   Patient Class: Emergency  Preferred Pharmacy:   [facility], [address] [place] [address] [address] [address]  Phone: [phone] Fax: [phone]    Primary Care Provider: Bibi Platt MD    Primary Insurance: Jerry Space MEDICARE [facility] REP  Secondary Insurance: [address] [place] [address]    ED Discharge Details: Discussed call from last evening with request for CM consult with CONNIE Soliman  Request made for her to follow up on ED  Consult  Additional Comments: On call note from 2/3/22: Patient to ED with anxiety and she cares for mother with dementia at home  ED requests home visit and was asked to place CM consult  Later received call that patient will dc home from ED  Mother's name is Alf Hernandez  Will request CM to follow up on 22

## 2022-02-04 NOTE — CASE MANAGEMENT
Case Management Discharge Planning Note    Patient name Tameka Oliver  Location ED 25/ED 25 MRN [id number]  : 1960 Date 2022       Current Admission Date: 2/3/2022  Current Admission Diagnosis:Dizzy   Patient Active Problem List    Diagnosis Date Noted    Pruritus 2022    Upper GI bleed 01/10/2022    Nausea and vomiting 10/22/2021    S/P tooth extraction 10/22/2021    Hyperlipidemia 2021    Word finding difficulty 2021    Hiatal hernia with gastroesophageal reflux disease and esophagitis 2021    Polyp of colon 2021    Melena 2020    Cellulitis of left upper extremity 2020    Erosive esophagitis 2020    Rash 2020    Symptomatic anemia 2020    Multiple excoriations 2020    Acute non-recurrent maxillary sinusitis 2020    Fever 2020    Medicare annual wellness visit, subsequent 2020    Dizziness 2020    Fall at home 2020    Gastroesophageal reflux disease with esophagitis without hemorrhage 2020    Hyponatremia 2020    Problem related to living arrangement 2020    Hypokalemia     NMS (neuroleptic malignant syndrome) 2020    Iron deficiency anemia due to chronic blood loss 2019    Chronic bilateral low back pain without sciatica 2019    Right hip pain 07/15/2019    Essential hypertension 2019    Elevated BP without diagnosis of hypertension 2019    Dermal hypersensitivity reaction 2018    ABIMAEL (generalized anxiety disorder) 2018    Schizoaffective disorder ([address] ) 2018    Serotonin syndrome 2018    Other fatigue 2018    Spinal stenosis of lumbar region with neurogenic claudication 06/15/2018    Lumbar spondylosis 06/15/2018    T12 compression fracture ([address] ) 06/15/2018    Synovial cyst of lumbar facet joint 06/15/2018    Localized osteoporosis with current pathological fracture with routine healing 05/25/2018    Lumbar radiculopathy 03/19/2018    DDD (degenerative disc disease), lumbar 03/19/2018    Spondylolisthesis at L4-L5 level 03/19/2018    Anxiety 10/31/2016    Acquired hypothyroidism 10/31/2016    Constipation 04/10/2014    Bulimia nervosa in remission 03/19/2014      LOS (days): 0  Geometric Mean LOS (GMLOS) (days):   Days to GMLOS:     OBJECTIVE:      Current admission status: Emergency   Preferred Pharmacy:   [facility], [address] [place] [address] [place]  [address] [address]  Phone: [phone] Fax: [phone]    Primary Care Provider: Heydi Holguin MD    Primary Insurance: [address] [place] [address],[address] [facility] REP  Secondary Insurance: [facility]    DISCHARGE DETAILS:        Additional Comments: CM recieved a call from Juanita Mosqueda stating that overnight while on call she recieved a consult for pt as ED staff had concerns about pt caring for her mother who has dementia  CM chart reviewed both pt and mother and did not see any mention of a previous CM or home services  CM called [address] ([phone]) and spoke to Beverly Bishop who states that pt's mother is already open with [facility] services and has a CM assigned (Suha Cook)  Pt's mother also gets Meals on Wheels, a [facility] paid medical alter button, and home health aides  CM reached out to [place] but was only able to leave a  requesting that she follow up with pt

## 2022-02-09 ENCOUNTER — HOSPITAL ENCOUNTER (OUTPATIENT)
Facility: HOSPITAL | Age: 62
Setting detail: OBSERVATION
Discharge: HOME HEALTH | End: 2022-02-11
Attending: EMERGENCY MEDICINE | Admitting: INTERNAL MEDICINE
Payer: MEDICARE

## 2022-02-09 DIAGNOSIS — R03.0 ELEVATED BLOOD PRESSURE READING: ICD-10-CM

## 2022-02-09 DIAGNOSIS — N39.0 UTI (URINARY TRACT INFECTION): ICD-10-CM

## 2022-02-09 DIAGNOSIS — G93.40 ACUTE ENCEPHALOPATHY: Primary | ICD-10-CM

## 2022-02-09 DIAGNOSIS — F25.1 SCHIZOAFFECTIVE DISORDER, DEPRESSIVE TYPE (HCC): ICD-10-CM

## 2022-02-09 DIAGNOSIS — R23.4 SCAB: ICD-10-CM

## 2022-02-09 DIAGNOSIS — I10 HYPERTENSION: ICD-10-CM

## 2022-02-09 DIAGNOSIS — E87.1 HYPONATREMIA: ICD-10-CM

## 2022-02-09 DIAGNOSIS — R11.2 NAUSEA AND VOMITING: ICD-10-CM

## 2022-02-09 DIAGNOSIS — E87.6 HYPOKALEMIA: ICD-10-CM

## 2022-02-09 DIAGNOSIS — D64.9 ANEMIA: ICD-10-CM

## 2022-02-09 PROBLEM — R46.89 SELF NEGLECT: Status: ACTIVE | Noted: 2022-02-09

## 2022-02-09 LAB
ALBUMIN SERPL BCP-MCNC: 3.6 G/DL (ref 3.5–5)
ALP SERPL-CCNC: 98 U/L (ref 46–116)
ALT SERPL W P-5'-P-CCNC: 30 U/L (ref 12–78)
AMPHETAMINES SERPL QL SCN: NEGATIVE
ANION GAP SERPL CALCULATED.3IONS-SCNC: 7 MMOL/L (ref 4–13)
AST SERPL W P-5'-P-CCNC: 24 U/L (ref 5–45)
ATRIAL RATE: 67 BPM
BACTERIA UR QL AUTO: ABNORMAL /HPF
BARBITURATES UR QL: NEGATIVE
BASOPHILS # BLD AUTO: 0.05 THOUSANDS/ΜL (ref 0–0.1)
BASOPHILS NFR BLD AUTO: 1 % (ref 0–1)
BENZODIAZ UR QL: NEGATIVE
BILIRUB SERPL-MCNC: 0.28 MG/DL (ref 0.2–1)
BILIRUB UR QL STRIP: NEGATIVE
BUN SERPL-MCNC: 9 MG/DL (ref 5–25)
CALCIUM SERPL-MCNC: 9.4 MG/DL (ref 8.3–10.1)
CARDIAC TROPONIN I PNL SERPL HS: 3 NG/L
CHLORIDE SERPL-SCNC: 98 MMOL/L (ref 100–108)
CLARITY UR: CLEAR
CO2 SERPL-SCNC: 26 MMOL/L (ref 21–32)
COCAINE UR QL: NEGATIVE
COLOR UR: YELLOW
CREAT SERPL-MCNC: 0.58 MG/DL (ref 0.6–1.3)
EOSINOPHIL # BLD AUTO: 0.08 THOUSAND/ΜL (ref 0–0.61)
EOSINOPHIL NFR BLD AUTO: 2 % (ref 0–6)
ERYTHROCYTE [DISTWIDTH] IN BLOOD BY AUTOMATED COUNT: 20.1 % (ref 11.6–15.1)
ETHANOL EXG-MCNC: 0 MG/DL
GFR SERPL CREATININE-BSD FRML MDRD: 99 ML/MIN/1.73SQ M
GLUCOSE SERPL-MCNC: 101 MG/DL (ref 65–140)
GLUCOSE UR STRIP-MCNC: NEGATIVE MG/DL
HCT VFR BLD AUTO: 34.2 % (ref 34.8–46.1)
HGB BLD-MCNC: 9.7 G/DL (ref 11.5–15.4)
HGB UR QL STRIP.AUTO: NEGATIVE
HYALINE CASTS #/AREA URNS LPF: ABNORMAL /LPF
IMM GRANULOCYTES # BLD AUTO: 0.04 THOUSAND/UL (ref 0–0.2)
IMM GRANULOCYTES NFR BLD AUTO: 1 % (ref 0–2)
KETONES UR STRIP-MCNC: NEGATIVE MG/DL
LEUKOCYTE ESTERASE UR QL STRIP: NEGATIVE
LYMPHOCYTES # BLD AUTO: 0.53 THOUSANDS/ΜL (ref 0.6–4.47)
LYMPHOCYTES NFR BLD AUTO: 12 % (ref 14–44)
MCH RBC QN AUTO: 19.4 PG (ref 26.8–34.3)
MCHC RBC AUTO-ENTMCNC: 28.4 G/DL (ref 31.4–37.4)
MCV RBC AUTO: 69 FL (ref 82–98)
METHADONE UR QL: NEGATIVE
MONOCYTES # BLD AUTO: 0.34 THOUSAND/ΜL (ref 0.17–1.22)
MONOCYTES NFR BLD AUTO: 8 % (ref 4–12)
NEUTROPHILS # BLD AUTO: 3.51 THOUSANDS/ΜL (ref 1.85–7.62)
NEUTS SEG NFR BLD AUTO: 76 % (ref 43–75)
NITRITE UR QL STRIP: POSITIVE
NON-SQ EPI CELLS URNS QL MICRO: ABNORMAL /HPF
NRBC BLD AUTO-RTO: 0 /100 WBCS
OPIATES UR QL SCN: NEGATIVE
OXYCODONE+OXYMORPHONE UR QL SCN: NEGATIVE
P AXIS: 53 DEGREES
PCP UR QL: NEGATIVE
PH UR STRIP.AUTO: 7.5 [PH]
PLATELET # BLD AUTO: 404 THOUSANDS/UL (ref 149–390)
PMV BLD AUTO: 9 FL (ref 8.9–12.7)
POTASSIUM SERPL-SCNC: 3.2 MMOL/L (ref 3.5–5.3)
PR INTERVAL: 126 MS
PROT SERPL-MCNC: 7.6 G/DL (ref 6.4–8.2)
PROT UR STRIP-MCNC: NEGATIVE MG/DL
QRS AXIS: 25 DEGREES
QRSD INTERVAL: 80 MS
QT INTERVAL: 394 MS
QTC INTERVAL: 416 MS
RBC # BLD AUTO: 4.99 MILLION/UL (ref 3.81–5.12)
RBC #/AREA URNS AUTO: ABNORMAL /HPF
SODIUM SERPL-SCNC: 131 MMOL/L (ref 136–145)
SP GR UR STRIP.AUTO: 1.01 (ref 1–1.03)
T WAVE AXIS: 69 DEGREES
THC UR QL: NEGATIVE
TSH SERPL DL<=0.05 MIU/L-ACNC: 1.99 UIU/ML (ref 0.36–3.74)
UROBILINOGEN UR QL STRIP.AUTO: 0.2 E.U./DL
VENTRICULAR RATE: 67 BPM
WBC # BLD AUTO: 4.55 THOUSAND/UL (ref 4.31–10.16)
WBC #/AREA URNS AUTO: ABNORMAL /HPF

## 2022-02-09 PROCEDURE — 96361 HYDRATE IV INFUSION ADD-ON: CPT

## 2022-02-09 PROCEDURE — 99220 PR INITIAL OBSERVATION CARE/DAY 70 MINUTES: CPT | Performed by: INTERNAL MEDICINE

## 2022-02-09 PROCEDURE — 36415 COLL VENOUS BLD VENIPUNCTURE: CPT | Performed by: EMERGENCY MEDICINE

## 2022-02-09 PROCEDURE — 99285 EMERGENCY DEPT VISIT HI MDM: CPT | Performed by: EMERGENCY MEDICINE

## 2022-02-09 PROCEDURE — 96360 HYDRATION IV INFUSION INIT: CPT

## 2022-02-09 PROCEDURE — 82075 ASSAY OF BREATH ETHANOL: CPT | Performed by: EMERGENCY MEDICINE

## 2022-02-09 PROCEDURE — 80053 COMPREHEN METABOLIC PANEL: CPT | Performed by: EMERGENCY MEDICINE

## 2022-02-09 PROCEDURE — 93010 ELECTROCARDIOGRAM REPORT: CPT | Performed by: INTERNAL MEDICINE

## 2022-02-09 PROCEDURE — 99285 EMERGENCY DEPT VISIT HI MDM: CPT

## 2022-02-09 PROCEDURE — 99203 OFFICE O/P NEW LOW 30 MIN: CPT | Performed by: PSYCHIATRY & NEUROLOGY

## 2022-02-09 PROCEDURE — 80307 DRUG TEST PRSMV CHEM ANLYZR: CPT | Performed by: EMERGENCY MEDICINE

## 2022-02-09 PROCEDURE — 93005 ELECTROCARDIOGRAM TRACING: CPT

## 2022-02-09 PROCEDURE — 85025 COMPLETE CBC W/AUTO DIFF WBC: CPT | Performed by: EMERGENCY MEDICINE

## 2022-02-09 PROCEDURE — 84443 ASSAY THYROID STIM HORMONE: CPT | Performed by: EMERGENCY MEDICINE

## 2022-02-09 PROCEDURE — 81001 URINALYSIS AUTO W/SCOPE: CPT | Performed by: EMERGENCY MEDICINE

## 2022-02-09 PROCEDURE — 84484 ASSAY OF TROPONIN QUANT: CPT | Performed by: EMERGENCY MEDICINE

## 2022-02-09 RX ORDER — CEPHALEXIN 250 MG/1
500 CAPSULE ORAL ONCE
Status: COMPLETED | OUTPATIENT
Start: 2022-02-09 | End: 2022-02-09

## 2022-02-09 RX ORDER — ONDANSETRON 2 MG/ML
4 INJECTION INTRAMUSCULAR; INTRAVENOUS ONCE
Status: DISCONTINUED | OUTPATIENT
Start: 2022-02-09 | End: 2022-02-09

## 2022-02-09 RX ORDER — POTASSIUM CHLORIDE 20MEQ/15ML
40 LIQUID (ML) ORAL ONCE
Status: COMPLETED | OUTPATIENT
Start: 2022-02-09 | End: 2022-02-09

## 2022-02-09 RX ORDER — LEVOTHYROXINE SODIUM 0.03 MG/1
25 TABLET ORAL
Status: DISCONTINUED | OUTPATIENT
Start: 2022-02-10 | End: 2022-02-11 | Stop reason: HOSPADM

## 2022-02-09 RX ORDER — POTASSIUM CHLORIDE 20 MEQ/1
40 TABLET, EXTENDED RELEASE ORAL ONCE
Status: DISCONTINUED | OUTPATIENT
Start: 2022-02-09 | End: 2022-02-09

## 2022-02-09 RX ORDER — ACETAMINOPHEN 325 MG/1
650 TABLET ORAL EVERY 6 HOURS PRN
Status: DISCONTINUED | OUTPATIENT
Start: 2022-02-09 | End: 2022-02-11 | Stop reason: HOSPADM

## 2022-02-09 RX ORDER — POLYETHYLENE GLYCOL 3350 17 G/17G
17 POWDER, FOR SOLUTION ORAL DAILY
Status: DISCONTINUED | OUTPATIENT
Start: 2022-02-10 | End: 2022-02-11 | Stop reason: HOSPADM

## 2022-02-09 RX ORDER — SODIUM CHLORIDE 9 MG/ML
50 INJECTION, SOLUTION INTRAVENOUS CONTINUOUS
Status: DISCONTINUED | OUTPATIENT
Start: 2022-02-09 | End: 2022-02-10

## 2022-02-09 RX ORDER — LORAZEPAM 1 MG/1
2 TABLET ORAL EVERY 6 HOURS PRN
Status: DISCONTINUED | OUTPATIENT
Start: 2022-02-09 | End: 2022-02-11 | Stop reason: HOSPADM

## 2022-02-09 RX ORDER — TRIAMCINOLONE ACETONIDE 1 MG/G
CREAM TOPICAL 2 TIMES DAILY PRN
Status: DISCONTINUED | OUTPATIENT
Start: 2022-02-09 | End: 2022-02-11 | Stop reason: HOSPADM

## 2022-02-09 RX ORDER — FERROUS SULFATE 325(65) MG
325 TABLET ORAL 2 TIMES DAILY WITH MEALS
Status: DISCONTINUED | OUTPATIENT
Start: 2022-02-09 | End: 2022-02-10

## 2022-02-09 RX ORDER — PAROXETINE HYDROCHLORIDE 20 MG/1
60 TABLET, FILM COATED ORAL DAILY
Status: DISCONTINUED | OUTPATIENT
Start: 2022-02-10 | End: 2022-02-11 | Stop reason: HOSPADM

## 2022-02-09 RX ORDER — HYDRALAZINE HYDROCHLORIDE 10 MG/1
10 TABLET, FILM COATED ORAL EVERY 8 HOURS SCHEDULED
Status: DISCONTINUED | OUTPATIENT
Start: 2022-02-09 | End: 2022-02-11 | Stop reason: HOSPADM

## 2022-02-09 RX ORDER — CALCIUM CARBONATE 200(500)MG
1000 TABLET,CHEWABLE ORAL DAILY PRN
Status: DISCONTINUED | OUTPATIENT
Start: 2022-02-09 | End: 2022-02-11 | Stop reason: HOSPADM

## 2022-02-09 RX ORDER — ONDANSETRON 2 MG/ML
4 INJECTION INTRAMUSCULAR; INTRAVENOUS EVERY 6 HOURS PRN
Status: DISCONTINUED | OUTPATIENT
Start: 2022-02-09 | End: 2022-02-11 | Stop reason: HOSPADM

## 2022-02-09 RX ORDER — ACETAMINOPHEN 325 MG/1
975 TABLET ORAL EVERY 8 HOURS SCHEDULED
Status: DISCONTINUED | OUTPATIENT
Start: 2022-02-09 | End: 2022-02-11 | Stop reason: HOSPADM

## 2022-02-09 RX ORDER — PANTOPRAZOLE SODIUM 40 MG/1
40 TABLET, DELAYED RELEASE ORAL 2 TIMES DAILY
Status: DISCONTINUED | OUTPATIENT
Start: 2022-02-09 | End: 2022-02-11 | Stop reason: HOSPADM

## 2022-02-09 RX ADMIN — ZIPRASIDONE HYDROCHLORIDE 60 MG: 20 CAPSULE ORAL at 21:05

## 2022-02-09 RX ADMIN — SODIUM CHLORIDE 50 ML/HR: 0.9 INJECTION, SOLUTION INTRAVENOUS at 20:51

## 2022-02-09 RX ADMIN — ACETAMINOPHEN 975 MG: 325 TABLET, FILM COATED ORAL at 21:05

## 2022-02-09 RX ADMIN — LORAZEPAM 2 MG: 1 TABLET ORAL at 22:35

## 2022-02-09 RX ADMIN — POTASSIUM CHLORIDE 40 MEQ: 20 SOLUTION ORAL at 09:17

## 2022-02-09 RX ADMIN — PANTOPRAZOLE SODIUM 40 MG: 40 TABLET, DELAYED RELEASE ORAL at 21:05

## 2022-02-09 RX ADMIN — HYDRALAZINE HYDROCHLORIDE 10 MG: 10 TABLET, FILM COATED ORAL at 16:31

## 2022-02-09 RX ADMIN — CALCIUM CARBONATE (ANTACID) CHEW TAB 500 MG 1000 MG: 500 CHEW TAB at 21:05

## 2022-02-09 RX ADMIN — HYDRALAZINE HYDROCHLORIDE 10 MG: 10 TABLET, FILM COATED ORAL at 21:05

## 2022-02-09 RX ADMIN — SODIUM CHLORIDE 500 ML: 0.9 INJECTION, SOLUTION INTRAVENOUS at 09:10

## 2022-02-09 RX ADMIN — CEPHALEXIN 500 MG: 250 CAPSULE ORAL at 12:41

## 2022-02-09 NOTE — H&P
[facility]  H&P- Lori Velarde 1960, 64 y o  female MRN: [id number]  Unit/Bed#: [facility] 933-01 Encounter: [id number]  Primary Care Provider: Deonna Levin MD   Date and time admitted to hospital: 2/9/2022  7:32 AM    Hyponatremia  Assessment & Plan  Hyponatremia: patient has hyponatremia to 131  She has confusion as she is not taking her psych medications for 2-3 days and not eating, having loose stools  Possible due to hypovolemic hyponatremia, she has history  SSRI vs  SIADH  BMP in the morning  Will give gentle hydration and monitor progression  of sodium   Goal is 135-145  Raise of 10 meq over 24 hours only     * Self neglect  Assessment & Plan  Self neglect: possible depression, she is in a cold house as mother did not call for Heat repair  They would need outpatient CM to assist for heating in the house  Possibly due to not taking her medications, UTI and possible depression   Patient is not able to care for herself, which she was able before  Psych evaluation   CM consult   Possible rehab/fixing home issues and VNA to check up on daughter and mother     Hypokalemia  Assessment & Plan  Possibly related loose bm she has and not eating   Would provide and continue with regular diet  Replace potassium and recheck in the am   Check magnesium levels    Essential hypertension  Assessment & Plan  Essential hypertension   Not on medications at home  Will start hydralazine 10 mg tid, monitor hold for sbp<140    Schizoaffective disorder ([facility] [address] )  Assessment & Plan  Schizoaffective disorder  Probably depression in the setting of UTI? And hyponatremia, non compliance with medications in setting of self neglect and not having heat at home     Continue with Seroquel, Geodon      Acquired hypothyroidism  Assessment & Plan  Hypothyroidism  Continue with levothyroxine  TSH is normal limits    VTE Pharmacologic Prophylaxis: VTE Score: 5 High Risk (Score >/= 5) - Pharmacological DVT Prophylaxis Ordered: enoxaparin (Lovenox)  Sequential Compression Devices Ordered  Code Status: Level 1 - Full Code full code per patient chart, patient agreed   Discussion with family: Attempted to update  (mother) via phone  Unable to contact  Anticipated Length of Stay: Patient will be admitted on an inpatient basis with an anticipated length of stay of greater than 2 midnights secondary to self neglect, non complaince with medications, hyponatremia, UTI  Total Time for Visit, including Counseling / Coordination of Care: 45 minutes Greater than 50% of this total time spent on direct patient counseling and coordination of care  Chief Complaint: self neglect     History of Present Illness:  Caty Pitts is a 64 y o  female with a PMH of schizoaffective disorder, htn, anxiety, GERD, hypothyroidism, anemia, bulimia and chronic hyponatremia who presents with self neglect  She says she is "confused" at home as they have no heat  Her mother apparently did not want to call for heat to be fixed  Unsure the reason, financial? She did not take her psych medications, did not feed the CAT and mother was neglected as well  Patient says she has loose stool this morning  Work up showed hyponatremia 131  She needs psych evaluation and antibiotics for UTI  She will need case management assistance  She will need outpatient psych follow up as well at the time of discharge  Review of Systems:  Review of Systems   Constitutional: Negative for chills and fever  HENT: Negative for ear pain and sore throat  Eyes: Negative for pain and visual disturbance  Respiratory: Negative for cough and shortness of breath  Cardiovascular: Negative for chest pain and palpitations  Gastrointestinal: Positive for nausea  Negative for abdominal pain and vomiting  Genitourinary: Negative for difficulty urinating, dysuria and hematuria  Musculoskeletal: Negative for arthralgias and back pain  Skin: Negative for color change and rash  Neurological: Positive for light-headedness  Negative for dizziness, tremors, seizures, syncope, facial asymmetry, weakness and numbness  Psychiatric/Behavioral: Negative for agitation, behavioral problems, confusion and decreased concentration  All other systems reviewed and are negative  Past Medical and Surgical History:   Past Medical History:   Diagnosis Date    Anxiety     Arthritis     Back pain at L4-L5 level     Schreiber esophagus     Bulimia     Colon polyp     Disease of thyroid gland     hypothyroid    Ear problems     GERD (gastroesophageal reflux disease)     History of transfusion     Hyperlipidemia     Hypothyroidism     Leukopenia     Nasal congestion     NMS (neuroleptic malignant syndrome) 01/03/2020    Pneumonia     Rheumatoid arthritis involving right hip (HCC)     Sciatica     Seizure ([facility] [address] )     due to medication mix up 8/2018 only one historically    Seizures ([facility] [address] )     Synovial cyst of lumbar spine     Vertigo     Weight gain        Past Surgical History:   Procedure Laterality Date    BONE MARROW BIOPSY      BREAST SURGERY      enlargement with implants    CLOSED REDUCTION DISTAL FEMUR FRACTURE      COLONOSCOPY      COSMETIC SURGERY      DENTAL SURGERY      HIP ARTHROPLASTY Right 1/2/2020    Procedure: REVISION TOTAL HIP ARTHROPLASTY WITH REPAIR OF PARIPROSTHETIC FRACTURE - POSTERIOR APPROACH;  Surgeon: Ernesto Dominguez MD;  [facility]: [facility];  Service: Orthopedics    OR ESOPHAGOGASTRODUODENOSCOPY TRANSORAL DIAGNOSTIC N/A 5/11/2021    Procedure: ESOPHAGOGASTRODUODENOSCOPY (EGD); Surgeon:  Lupillo Partida MD;  [facility];  Service: General    OR ESOPHAGUS SURG PROCEDURE UNLISTED N/A 5/11/2021    Procedure: FUNDOPLICATION TRANSORAL INCISIONLESS;  Surgeon: Lindsey Ray MD;  [facility];  Service: Gastroenterology    OR LAP, REPAIR PARAESOPHAGEAL HERNIA, INCL FUNDOPLASTY W/ MESH N/A 5/11/2021    Procedure: REPAIR HERNIA PARAESOPHAGEAL  LAPAROSCOPIC;  Surgeon: Dagoberto Cheatham MD;  [facility];  Service: General    DE TOTAL HIP ARTHROPLASTY Right 12/11/2019    Procedure: ARTHROPLASTY HIP TOTAL ANTERIOR;  Surgeon: Norberto Knutson MD;  [facility];  Service: Orthopedics    RHINOPLASTY      SINUS SURGERY      TRANSORAL INCISIONLESS FUNDOPLICATION (TIF)  64/07/3385       Meds/Allergies:  Prior to Admission medications    Medication Sig Start Date End Date Taking? Authorizing Provider   acetaminophen (TYLENOL) 325 mg tablet Take 3 tablets (975 mg total) by mouth every 8 (eight) hours 5/14/21   ABENA Owusu   ferrous sulfate 325 (65 Fe) mg tablet Take 1 tablet (325 mg total) by mouth 2 (two) times a day with meals 1/12/22   Vipul Landaverde PA-C   levothyroxine 25 mcg tablet Take 1 tablet (25 mcg total) by mouth daily 5/19/21   Wilner Jimenez MD   LORazepam (ATIVAN) 2 mg tablet Take 1 tablet (2 mg total) by mouth every 6 (six) hours as needed for anxiety 2/3/22   Wilner Jimenez MD   ondansetron [facility]) 4 mg tablet Take 1 tablet (4 mg total) by mouth every 8 (eight) hours as needed for nausea or vomiting 2/1/22   Wilner Jimenez MD   pantoprazole (PROTONIX) 40 mg tablet Take 1 tablet (40 mg total) by mouth 2 (two) times a day 1/28/22   ABENA Che   PARoxetine (PAXIL) 30 mg tablet TAKE 2 TABLETS BY MOUTH DAILY 11/8/21   Wilner Jimenez MD   triamcinolone (KENALOG) 0 1 % cream Apply topically 2 (two) times a day as needed for rash 2/1/22   Wilner Jimenez MD   ziprasidone (GEODON) 80 mg capsule TAKE 1 CAPSULE BY MOUTH EVERY DAY 11/8/21   Wilner Jimenez MD     I have reviewed home medications using recent Epic encounter  Allergies:    Allergies   Allergen Reactions    Risperdal [Risperidone] Shortness Of Breath     Rapid heart beat, SOB    Zyprexa [Olanzapine] Shortness Of Breath     Rapid heartbeat    Latex Rash     Band aids, adhesives wears normal underwear, can eat bananas  USE PAPER TAPE       Social History:  Marital Status: Single   Occupation:    Patient Pre-hospital Living Situation: Home  Patient Pre-hospital Level of Mobility: walks  Patient Pre-hospital Diet Restrictions: regular   Substance Use History:   Social History     Substance and Sexual Activity   Alcohol Use Not Currently     Social History     Tobacco Use   Smoking Status Never Smoker   Smokeless Tobacco Never Used     Social History     Substance and Sexual Activity   Drug Use Not Currently       Family History:  Family History   Problem Relation Age of Onset    Uterine cancer Mother     Esophageal cancer Father     Colon cancer Maternal Grandmother        Physical Exam:     Vitals:   Blood Pressure: 137/82 (02/09/22 1925)  Pulse: 83 (02/09/22 1925)  Temperature: 97 8 °F (36 6 °C) (02/09/22 1925)  Temp Source: Oral (02/09/22 1925)  Respirations: 20 (02/09/22 1925)  Height: 5' 4" (162 6 cm) (02/09/22 1925)  Weight - Scale: 77 3 kg (170 lb 6 7 oz) (02/09/22 1925)  SpO2: 97 % (02/09/22 1925)    Physical Exam  Vitals and nursing note reviewed  Constitutional:       General: She is not in acute distress  Appearance: She is well-developed  HENT:      Head: Normocephalic and atraumatic  Eyes:      Conjunctiva/sclera: Conjunctivae normal    Cardiovascular:      Rate and Rhythm: Normal rate and regular rhythm  Heart sounds: No murmur heard  Pulmonary:      Effort: Pulmonary effort is normal  No respiratory distress  Breath sounds: Normal breath sounds  Abdominal:      General: There is distension  Palpations: Abdomen is soft  There is no mass  Tenderness: There is no abdominal tenderness  Musculoskeletal:         General: No swelling, tenderness, deformity or signs of injury  Normal range of motion  Cervical back: Neck supple  Skin:     General: Skin is warm and dry  Coloration: Skin is not jaundiced or pale     Neurological:      General: No focal deficit present  Mental Status: She is alert and oriented to person, place, and time  Cranial Nerves: No cranial nerve deficit  Sensory: No sensory deficit  Psychiatric:      Comments: Mood is low, behavior is normal           Additional Data:     Lab Results:  Results from last 7 days   Lab Units 02/09/22  0829   WBC Thousand/uL 4 55   HEMOGLOBIN g/dL 9 7*   HEMATOCRIT % 34 2*   PLATELETS Thousands/uL 404*   NEUTROS PCT % 76*   LYMPHS PCT % 12*   MONOS PCT % 8   EOS PCT % 2     Results from last 7 days   Lab Units 02/09/22  0829   SODIUM mmol/L 131*   POTASSIUM mmol/L 3 2*   CHLORIDE mmol/L 98*   CO2 mmol/L 26   BUN mg/dL 9   CREATININE mg/dL 0 58*   ANION GAP mmol/L 7   CALCIUM mg/dL 9 4   ALBUMIN g/dL 3 6   TOTAL BILIRUBIN mg/dL 0 28   ALK PHOS U/L 98   ALT U/L 30   AST U/L 24   GLUCOSE RANDOM mg/dL 101                       Imaging: No pertinent imaging reviewed  No orders to display       EKG and Other Studies Reviewed on Admission:   · EKG: NSR  HR 80     ** Please Note: This note has been constructed using a voice recognition system   **

## 2022-02-09 NOTE — ASSESSMENT & PLAN NOTE
Essential hypertension   Not on medications at home  Will start hydralazine 10 mg tid, monitor hold for sbp<140

## 2022-02-09 NOTE — ASSESSMENT & PLAN NOTE
Possibly related loose bm she has and not eating   Would provide and continue with regular diet  Replace potassium and recheck in the am   Check magnesium levels

## 2022-02-09 NOTE — ED PROVIDER NOTES
History  Chief Complaint   Patient presents with    Medication Problem     Pt reports she has not been taking her psychiatric medications for the past few days  Patient is a 59-year-old female, with a history significant for schizoaffective disorder, who presents to the ED today, via EMS, because she has not taken her medications in 2-3 days  When questioned why, patient states it is because she has been confused  On further questioning, patient states that she has not been eating, bathing, taking care of her mother who lives with her, or taking care of her cat for this period of time as well  She states that there is food and her medications in the house  The reason for this is confusion as well  Patient denies any inciting events at the onset of her confusion  Patient denies any traumas, pain anywhere, dysuria, polyuria, fever  She does report nausea and vomiting  There is no associated fever, chest pain, shortness of breath, abdominal pain, diarrhea, urinary symptoms, weakness, numbness, headache  Patient denies alcohol and drug use  No clear exacerbating or remitting factors  The course of her symptoms appears to be constant  Patient is without other concerns at this time  No SI/HI/AH/VH     - No language barrier    - History obtained from patient  - Previous charting was reviewed          Prior to Admission Medications   Prescriptions Last Dose Informant Patient Reported? Taking?    LORazepam (ATIVAN) 2 mg tablet   No No   Sig: Take 1 tablet (2 mg total) by mouth every 6 (six) hours as needed for anxiety   PARoxetine (PAXIL) 30 mg tablet   No No   Sig: TAKE 2 TABLETS BY MOUTH DAILY   acetaminophen (TYLENOL) 325 mg tablet  Self No No   Sig: Take 3 tablets (975 mg total) by mouth every 8 (eight) hours   ferrous sulfate 325 (65 Fe) mg tablet   No No   Sig: Take 1 tablet (325 mg total) by mouth 2 (two) times a day with meals   levothyroxine 25 mcg tablet  Self No No   Sig: Take 1 tablet (25 mcg total) by mouth daily   ondansetron (ZOFRAN) 4 mg tablet   No No   Sig: Take 1 tablet (4 mg total) by mouth every 8 (eight) hours as needed for nausea or vomiting   pantoprazole (PROTONIX) 40 mg tablet   No No   Sig: Take 1 tablet (40 mg total) by mouth 2 (two) times a day   triamcinolone (KENALOG) 0 1 % cream   No No   Sig: Apply topically 2 (two) times a day as needed for rash   ziprasidone (GEODON) 80 mg capsule   No No   Sig: TAKE 1 CAPSULE BY MOUTH EVERY DAY      Facility-Administered Medications: None       Past Medical History:   Diagnosis Date    Anxiety     Arthritis     Back pain at L4-L5 level     Schreiber esophagus     Bulimia     Colon polyp     Disease of thyroid gland     hypothyroid    Ear problems     GERD (gastroesophageal reflux disease)     History of transfusion     Hyperlipidemia     Hypothyroidism     Leukopenia     Nasal congestion     NMS (neuroleptic malignant syndrome) 01/03/2020    Pneumonia     Rheumatoid arthritis involving right hip (HCC)     Sciatica     Seizure ([address] )     due to medication mix up 8/2018 only one historically    Seizures ([address] )     Synovial cyst of lumbar spine     Vertigo     Weight gain        Past Surgical History:   Procedure Laterality Date    BONE MARROW BIOPSY      BREAST SURGERY      enlargement with implants    CLOSED REDUCTION DISTAL FEMUR FRACTURE      COLONOSCOPY      COSMETIC SURGERY      DENTAL SURGERY      HIP ARTHROPLASTY Right 1/2/2020    Procedure: REVISION TOTAL HIP ARTHROPLASTY WITH REPAIR OF PARIPROSTHETIC FRACTURE - POSTERIOR APPROACH;  Surgeon: Ortiz Albrecht MD;  [facility];  Service: Orthopedics    AR ESOPHAGOGASTRODUODENOSCOPY TRANSORAL DIAGNOSTIC N/A 5/11/2021    Procedure: ESOPHAGOGASTRODUODENOSCOPY (EGD); Surgeon:  Allie Cardenas MD;  [facility];  Service: General    AR ESOPHAGUS SURG PROCEDURE UNLISTED N/A 5/11/2021    Procedure: FUNDOPLICATION TRANSORAL INCISIONLESS;  Surgeon: Deepak Ruiz Chula Suero MD;  [facility];  Service: Gastroenterology    SC LAP, REPAIR PARAESOPHAGEAL HERNIA, INCL FUNDOPLASTY W/ MESH N/A 5/11/2021    Procedure: REPAIR HERNIA PARAESOPHAGEAL  LAPAROSCOPIC;  Surgeon: Андрей Eugene MD;  [facility];  Service: General    SC TOTAL HIP ARTHROPLASTY Right 12/11/2019    Procedure: ARTHROPLASTY HIP TOTAL ANTERIOR;  Surgeon: Boston Mcnamara MD;  [facility];  Service: Orthopedics    RHINOPLASTY      SINUS SURGERY      TRANSORAL INCISIONLESS FUNDOPLICATION (TIF)  08/13/0882       Family History   Problem Relation Age of Onset    Uterine cancer Mother     Esophageal cancer Father     Colon cancer Maternal Grandmother      I have reviewed and agree with the history as documented  E-Cigarette/Vaping    E-Cigarette Use Never User      E-Cigarette/Vaping Substances    Nicotine No     THC No     CBD No     Flavoring No     Other No     Unknown No      Social History     Tobacco Use    Smoking status: Never Smoker    Smokeless tobacco: Never Used   Vaping Use    Vaping Use: Never used   Substance Use Topics    Alcohol use: Not Currently    Drug use: Not Currently        Review of Systems   Constitutional: Negative for fever  HENT: Negative for trouble swallowing  Eyes: Negative for visual disturbance  Respiratory: Negative for shortness of breath  Cardiovascular: Negative for chest pain  Gastrointestinal: Positive for nausea and vomiting  Negative for abdominal pain and diarrhea  Endocrine: Negative for polyuria  Genitourinary: Negative for dysuria  Musculoskeletal: Negative for gait problem  Skin: Positive for wound  Allergic/Immunologic: Negative for environmental allergies  Neurological: Negative for weakness, numbness and headaches  Hematological: Negative for adenopathy  Psychiatric/Behavioral: Positive for confusion  Negative for suicidal ideas  All other systems reviewed and are negative        Physical Exam  ED Triage Vitals [02/09/22 0737]   Temperature Pulse Respirations Blood Pressure SpO2   98 3 °F (36 8 °C) 80 18 (!) 173/94 98 %      Temp Source Heart Rate Source Patient Position - Orthostatic VS BP Location FiO2 (%)   Oral Monitor Lying Right arm --      Pain Score       No Pain             Orthostatic Vital Signs  Vitals:    02/09/22 0737 02/09/22 1029   BP: (!) 173/94 161/74   Pulse: 80 92   Patient Position - Orthostatic VS: Lying Lying       Physical Exam  Vitals and nursing note reviewed  Exam conducted with a chaperone present  Constitutional:       General: She is not in acute distress  Appearance: She is not ill-appearing, toxic-appearing or diaphoretic  Comments: Patient appears comfortable during my evaluation    HENT:      Head: Normocephalic and atraumatic  Right Ear: External ear normal       Left Ear: External ear normal       Nose: Nose normal  No rhinorrhea  Mouth/Throat:      Mouth: Mucous membranes are moist       Pharynx: Oropharynx is clear  No oropharyngeal exudate or posterior oropharyngeal erythema  Eyes:      General: No scleral icterus  Right eye: No discharge  Left eye: No discharge  Extraocular Movements: Extraocular movements intact  Conjunctiva/sclera: Conjunctivae normal       Comments: Slight anisocoria   Cardiovascular:      Rate and Rhythm: Normal rate and regular rhythm  Heart sounds: Normal heart sounds  No murmur heard  No friction rub  No gallop  Comments: 2+ Radial  Pulmonary:      Effort: Pulmonary effort is normal  No respiratory distress  Breath sounds: Normal breath sounds  No stridor  No wheezing, rhonchi or rales  Abdominal:      General: Abdomen is flat  There is no distension  Palpations: Abdomen is soft  Tenderness: There is no abdominal tenderness  There is no right CVA tenderness, left CVA tenderness, guarding or rebound  Genitourinary:     General: Normal vulva        Rectum: Normal    Musculoskeletal:         General: No deformity  Cervical back: Normal range of motion and neck supple  No rigidity or tenderness  No muscular tenderness  Lymphadenopathy:      Cervical: No cervical adenopathy  Skin:     General: Skin is warm and dry  Capillary Refill: Capillary refill takes less than 2 seconds  Findings: Lesion (Multiple areas of abrasions and scabbing without surrounding signs of cellulitis/drainage) present  Neurological:      Mental Status: She is alert  Comments: AAO x4  Cranial nerves 2-12 intact  5/5 strength in all four extremities  Sensation to light touch in intact in all four extremities  No pronator drift  Negative Babinski  Patient able to ambulate without difficulty  Patient is speaking clearly in complete sentences  Patient is answering appropriately and able follow commands  Psychiatric:         Behavior: Behavior normal       Comments: Flat affect  Poor insight  Patient arrived dressed in street clothes that are dirty/stained  Linear thought process    No SI/HI/VH/AH         ED Medications  Medications   ondansetron (ZOFRAN) injection 4 mg (0 mg Intravenous Hold 2/9/22 0843)   sodium chloride 0 9 % bolus 500 mL (500 mL Intravenous New Bag 2/9/22 0910)   potassium chloride oral solution 40 mEq (40 mEq Oral Given 2/9/22 0917)   cephalexin (KEFLEX) capsule 500 mg (500 mg Oral Given 2/9/22 1241)       Diagnostic Studies  Results Reviewed     Procedure Component Value Units Date/Time    Urine Microscopic [[id number]]  (Abnormal) Collected: 02/09/22 1208    Lab Status: Final result Specimen: Urine, Clean Catch Updated: 02/09/22 1222     RBC, UA None Seen /hpf      WBC, UA 4-10 /hpf      Epithelial Cells None Seen /hpf      Bacteria, UA Occasional /hpf      Hyaline Casts, UA None Seen /lpf     UA w Reflex to Microscopic w Reflex to Culture [[id number]]  (Abnormal) Collected: 02/09/22 1208    Lab Status: Final result Specimen: Urine, Clean Catch Updated: 02/09/22 1220     Color, UA Yellow     Clarity, UA Clear     Specific Gravity, UA 1 010     pH, UA 7 5     Leukocytes, UA Negative     Nitrite, UA Positive     Protein, UA Negative mg/dl      Glucose, UA Negative mg/dl      Ketones, UA Negative mg/dl      Urobilinogen, UA 0 2 E U /dl      Bilirubin, UA Negative     Blood, UA Negative    Rapid drug screen, urine [[id number]] Collected: 02/09/22 1208    Lab Status: In process Specimen: Urine, Clean Catch Updated: 02/09/22 1213    TSH, 3rd generation [[id number]]  (Normal) Collected: 02/09/22 0829    Lab Status: Final result Specimen: Blood from Arm, Right Updated: 02/09/22 0909     TSH 3RD GENERATON 1 990 uIU/mL     Narrative:      Patients undergoing fluorescein dye angiography may retain small amounts of fluorescein in the body for 48-72 hours post procedure  Samples containing fluorescein can produce falsely depressed TSH values  If the patient had this procedure,a specimen should be resubmitted post fluorescein clearance        POCT alcohol breath test [[id number]]  (Normal) Resulted: 02/09/22 0906    Lab Status: Final result Updated: 02/09/22 0906     EXTBreath Alcohol 0 000    HS Troponin 0hr (reflex protocol) [[id number]]  (Normal) Collected: 02/09/22 0829    Lab Status: Final result Specimen: Blood from Arm, Right Updated: 02/09/22 0905     hs TnI 0hr 3 ng/L     Comprehensive metabolic panel [[id number]]  (Abnormal) Collected: 02/09/22 0829    Lab Status: Final result Specimen: Blood from Arm, Right Updated: 02/09/22 0902     Sodium 131 mmol/L      Potassium 3 2 mmol/L      Chloride 98 mmol/L      CO2 26 mmol/L      ANION GAP 7 mmol/L      BUN 9 mg/dL      Creatinine 0 58 mg/dL      Glucose 101 mg/dL      Calcium 9 4 mg/dL      AST 24 U/L      ALT 30 U/L      Alkaline Phosphatase 98 U/L      Total Protein 7 6 g/dL      Albumin 3 6 g/dL      Total Bilirubin 0 28 mg/dL      eGFR 99 ml/min/1 73sq m     Narrative:      [place] guidelines for Chronic Kidney Disease (CKD):     Stage 1 with normal or high GFR (GFR > 90 mL/min/1 73 square meters)    Stage 2 Mild CKD (GFR = 60-89 mL/min/1 73 square meters)    Stage 3A Moderate CKD (GFR = 45-59 mL/min/1 73 square meters)    Stage 3B Moderate CKD (GFR = 30-44 mL/min/1 73 square meters)    Stage 4 Severe CKD (GFR = 15-29 mL/min/1 73 square meters)    Stage 5 End Stage CKD (GFR <15 mL/min/1 73 square meters)  Note: GFR calculation is accurate only with a steady state creatinine    CBC and differential [[id number]]  (Abnormal) Collected: 02/09/22 0829    Lab Status: Final result Specimen: Blood from Arm, Right Updated: 02/09/22 0843     WBC 4 55 Thousand/uL      RBC 4 99 Million/uL      Hemoglobin 9 7 g/dL      Hematocrit 34 2 %      MCV 69 fL      MCH 19 4 pg      MCHC 28 4 g/dL      RDW 20 1 %      MPV 9 0 fL      Platelets 060 Thousands/uL      nRBC 0 /100 WBCs      Neutrophils Relative 76 %      Immat GRANS % 1 %      Lymphocytes Relative 12 %      Monocytes Relative 8 %      Eosinophils Relative 2 %      Basophils Relative 1 %      Neutrophils Absolute 3 51 Thousands/µL      Immature Grans Absolute 0 04 Thousand/uL      Lymphocytes Absolute 0 53 Thousands/µL      Monocytes Absolute 0 34 Thousand/µL      Eosinophils Absolute 0 08 Thousand/µL      Basophils Absolute 0 05 Thousands/µL                  No orders to display         Procedures  Procedures      ED Course  ED Course as of 02/09/22 1300   Wed Feb 09, 2022   0858 WBC: 4 55   0858 Hemoglobin(!): 9 7   0905 hs TnI 0hr: 3   1044 TSH 3RD GENERATON: 1 990   1133 Patient requests straight cath to get urine sample   1220 Nitrite, UA(!): Positive   1227 Bacteria, UA: Occasional   1227 Nitrite, UA(!): Positive   1227 WBC, UA(!): 4-10   1227 Epithelial Cells: None Seen   1242 Results reviewed with patient  Plan to admit as patient has not been taking care of herself/eating/taking medications since the onset of her symptoms    Patient is in agreement with this plan                             SBIRT 20yo+      Most Recent Value   SBIRT (24 yo +)    In order to provide better care to our patients, we are screening all of our patients for alcohol and drug use  Would it be okay to ask you these screening questions? No Filed at: 02/09/2022 0741                MDM  Number of Diagnoses or Management Options  Acute encephalopathy  Anemia  Elevated blood pressure reading  Hypokalemia  Hyponatremia  Nausea and vomiting  Scab  UTI (urinary tract infection)  Diagnosis management comments: Patient is a 28-year-old female, with a history significant for schizoaffective disorder, who presents to the ED today, via EMS, because she has not taken her medications in 2-3 days  When questioned why, patient states it is because she has been confused  On further questioning, patient states that she has not been eating, bathing, taking care of her mother who lives with her, or taking care of her cat for this period of time as well  The reason for this is confusion as well  Patient denies any inciting events at the onset of her confusion  Patient denies any traumas, pain anywhere, dysuria, polyuria, fever  She does report nausea and vomiting  Patient denies alcohol and drug use  Patient is currently afebrile and hemodynamically stable  Her physical exam is unremarkable except for apparently on washed street clothes, flat affect, poor insight, excoriations from scratching/picking  This presentation is concerning for:  Unmedicated schizoaffective disorder, lack of self-care  I also considered UTI, ACS given nausea and vomiting, thyroid dysregulation, electrolyte abnormality, EMMIE  Low clinical suspicion for meningitis/ICH at this time based upon history and physical exam   Will investigate with CBC, CMP, troponin, ECG, CXR, TSH, UA  Will manage with Zofran, crisis eval, further based on workup        Disposition  Final diagnoses:   Nausea and vomiting   Scab Elevated blood pressure reading   Anemia   Hyponatremia   Hypokalemia   UTI (urinary tract infection)   Acute encephalopathy     Time reflects when diagnosis was documented in both MDM as applicable and the Disposition within this note     Time User Action Codes Description Comment    2/9/2022  8:14 AM Emmer Rustam A Add [R11 2] Nausea and vomiting     2/9/2022  8:14 AM Emmer Rustam A Add [R41 0] Confusion     2/9/2022  8:14 AM Emmer Rusatm Modify [R11 2] Nausea and vomiting     2/9/2022  8:14 AM Emmer Rustam A Modify [R41 0] Confusion     2/9/2022  8:14 AM Emmer Rustam Add [R23 4] Scab     2/9/2022  8:59 AM Rosio Marlow Gilberto Add [R03 0] Elevated blood pressure reading     2/9/2022  8:59 AM Emmer Rustam A Add [D64 9] Anemia     2/9/2022  9:04 AM Emmer Rustam A Add [E87 1] Hyponatremia     2/9/2022  9:04 AM Emmer Rustam A Add [E87 6] Hypokalemia     2/9/2022 12:28 PM Rosio De La Garza A Add [N39 0] UTI (urinary tract infection)     2/9/2022 12:59 PM Emmer Rustam A Add [G93 40] Acute encephalopathy     2/9/2022 12:59 PM Emmer Rustam A Modify [R41 0] Confusion     2/9/2022 12:59 PM Emmer Rustam A Modify [G93 40] Acute encephalopathy     2/9/2022 12:59 PM Emmer Rustam A Remove [R41 0] Confusion       ED Disposition     ED Disposition Condition Date/Time Comment    Admit Stable Wed Feb 9, 2022 12:59 PM Case was discussed with CHERYL and the patient's admission status was agreed to be Admission Status: observation status to the service of Dr Leo Rajan   Follow-up Information    None         Patient's Medications   Discharge Prescriptions    No medications on file     No discharge procedures on file  PDMP Review       Value Time User    PDMP Reviewed  Yes 2/3/2022  4:26 PM Rik Cordon MD           ED Provider  Attending physically available and evaluated [place] OCAMPO managed the patient along with the ED Attending      Electronically Signed by         Henri Barron MD  02/09/22 1300

## 2022-02-09 NOTE — CONSULTS
Consultation - 6 [facility] ANNAMARIA Plascencia 64 y o  female MRN: [id number]  Unit/Bed#: ED 27 Encounter: [id number]      Chief Complaint: " I am confused"     History of Present Illness   Physician Requesting Consult: Soco Sanabria MD  Reason for Consult / Principal Problem: schizoaffective disorder, self neglect     Akhil White is a 64 y o  female with a PMH significant for schizoaffective disorder, HTN, GERD, hypothyroidism, anemia and hyponatremia who presented to ED complaining of worsening confusion, not able to taking care of herself and not taking her meds for at least a week  Patient lives with her mother who is in her 80s , she does not have any support system  Patient states she has not eaten in a few days, unable to take a bath or take care of her mother or cat  Patient was cooperative during my interview, she denies any hallucinations, suicidal or homicidal ideation  Patient has had multiple psychiatric admissions in the past (last one 2018), at least one suicidal attempt  Medication list confirmed with pharmacy because patient not sure what she is supposed to take  She is telling me that at least a week she has not taken her psychotropic medications  Psychiatric Review Of Systems:  sleep: yes  appetite changes: no  weight changes: no  energy/anergy: no  interest/pleasure/anhedonia: no  somatic symptoms: yes  anxiety/panic: no  rody: no  guilty/hopeless: no  self injurious behavior/risky behavior: no    Historical Information   Past Psychiatric History:   She had multiple inpatient psych admission ( [address] [place] [address] ), last admission 2018  Currently in treatment with primary physician  In Patient in 2018  Currently in treatment with PCP  Past Suicide attempts: yes she cut her wrist   Past Violent behavior: no   Past Psychiatric medication trial: Paxil, Tofranil, risperidone, Seroquel, Zyprexa, geodon , ativan       Substance Abuse History:  She denies any drugs or alcohol history      I have assessed this patient for substance use within the past 12 months  History of IP/OP rehabilitation program: n/a  Smoking history: never smoked  Family Psychiatric History:   Her mother had depression, no family history of substance abuse or history of suicide attempt    Social History  Education: post college graduate work or degree  Learning Disabilities: no  Marital history: single  Living arrangement, social support: The patient lives in home with mother  Occupational History: on permanent disability  Functioning Relationships: poor support system  Other Pertinent History: None    Traumatic History:   Abuse: no  Other Traumatic Events: none    Past Medical History:   Diagnosis Date    Anxiety     Arthritis     Back pain at L4-L5 level     Schreiber esophagus     Bulimia     Colon polyp     Disease of thyroid gland     hypothyroid    Ear problems     GERD (gastroesophageal reflux disease)     History of transfusion     Hyperlipidemia     Hypothyroidism     Leukopenia     Nasal congestion     NMS (neuroleptic malignant syndrome) 01/03/2020    Pneumonia     Rheumatoid arthritis involving right hip (HCC)     Sciatica     Seizure ([facility] [address] )     due to medication mix up 8/2018 only one historically    Seizures ([facility] [address] )     Synovial cyst of lumbar spine     Vertigo     Weight gain        Medical Review Of Systems:  Review of Systems - Negative except for diarrhea, confusion, sleep disturbances  All other systems were reviewed and are negative  Meds/Allergies   all current active meds have been reviewed, current meds:   Current Facility-Administered Medications   Medication Dose Route Frequency    hydrALAZINE (APRESOLINE) tablet 10 mg  10 mg Oral Q8H [address]    ondansetron (ZOFRAN) injection 4 mg  4 mg Intravenous Once    and PTA meds:   Prior to Admission Medications   Prescriptions Last Dose Informant Patient Reported? Taking?    LORazepam (ATIVAN) 2 mg tablet   No No   Sig: Take 1 tablet (2 mg total) by mouth every 6 (six) hours as needed for anxiety   PARoxetine (PAXIL) 30 mg tablet   No No   Sig: TAKE 2 TABLETS BY MOUTH DAILY   acetaminophen (TYLENOL) 325 mg tablet  Self No No   Sig: Take 3 tablets (975 mg total) by mouth every 8 (eight) hours   ferrous sulfate 325 (65 Fe) mg tablet   No No   Sig: Take 1 tablet (325 mg total) by mouth 2 (two) times a day with meals   levothyroxine 25 mcg tablet  Self No No   Sig: Take 1 tablet (25 mcg total) by mouth daily   ondansetron (ZOFRAN) 4 mg tablet   No No   Sig: Take 1 tablet (4 mg total) by mouth every 8 (eight) hours as needed for nausea or vomiting   pantoprazole (PROTONIX) 40 mg tablet   No No   Sig: Take 1 tablet (40 mg total) by mouth 2 (two) times a day   triamcinolone (KENALOG) 0 1 % cream   No No   Sig: Apply topically 2 (two) times a day as needed for rash   ziprasidone (GEODON) 80 mg capsule   No No   Sig: TAKE 1 CAPSULE BY MOUTH EVERY DAY      Facility-Administered Medications: None     Allergies   Allergen Reactions    Risperdal [Risperidone] Shortness Of Breath     Rapid heart beat, SOB    Zyprexa [Olanzapine] Shortness Of Breath     Rapid heartbeat    Latex Rash     Band aids, adhesives wears normal underwear, can eat bananas  USE PAPER TAPE       Objective   Vital signs in last 24 hours:  Temp:  [98 3 °F (36 8 °C)] 98 3 °F (36 8 °C)  HR:  [80-92] 92  Resp:  [18-20] 20  BP: (161-173)/(74-94) 161/74    No intake or output data in the 24 hours ending 02/09/22 1500    Mental Status Evaluation:  Appearance:  age appropriate and disheveled   Behavior:  normal   Speech:  normal pitch and normal volume   Mood:  dysthymic   Affect:  mood-congruent   Language: naming objects and repeating phrases   Thought Process:  goal directed   Associations: intact associations   Thought Content:  normal   Perceptual Disturbances: None   Risk Potential: Suicidal Ideations none, Homicidal Ideations none and Potential for Aggression No   Sensorium: person, place, time/date and situation   Memory:  recent and remote memory grossly intact   Cognition:  recent and remote memory grossly intact   Consciousness:  alert and awake    Attention: attention span appeared shorter than expected for age   Intellect: within normal limits   Fund of Knowledge: awareness of current events: fair, past history: fair and vocabulary: fair   Insight:  fair   Judgment: fair   Muscle Strength and Tone: within normal limits   Gait/Station: Unable to assess, patient in bed  Motor Activity: no abnormal movements     Lab Results:    I have personally reviewed all pertinent laboratory/tests results    Most Recent Labs:   Lab Results   Component Value Date    WBC 4 55 02/09/2022    RBC 4 99 02/09/2022    HGB 9 7 (L) 02/09/2022    HCT 34 2 (L) 02/09/2022     (H) 02/09/2022    RDW 20 1 (H) 02/09/2022    NEUTROABS 3 51 02/09/2022    SODIUM 131 (L) 02/09/2022    K 3 2 (L) 02/09/2022    CL 98 (L) 02/09/2022    CO2 26 02/09/2022    BUN 9 02/09/2022    CREATININE 0 58 (L) 02/09/2022    GLUC 101 02/09/2022    GLUF 91 05/13/2021    CALCIUM 9 4 02/09/2022    AST 24 02/09/2022    ALT 30 02/09/2022    ALKPHOS 98 02/09/2022    TP 7 6 02/09/2022    ALB 3 6 02/09/2022    TBILI 0 28 02/09/2022    CHOLESTEROL 181 05/13/2021    HDL 69 05/13/2021    TRIG 113 05/13/2021    LDLCALC 89 05/13/2021    NONHDLC 167 08/21/2018    KCP5EHDOCZXA 1 990 02/09/2022    FREET4 0 88 01/03/2020    T3FREE 2 47 08/21/2018    HGBA1C 5 0 05/13/2021    EAG 97 05/13/2021     Labs in last 72 hours:   Recent Labs     02/09/22  0829   WBC 4 55   RBC 4 99   HGB 9 7*   HCT 34 2*   *   RDW 20 1*   NEUTROABS 3 51   SODIUM 131*   K 3 2*   CL 98*   CO2 26   BUN 9   CREATININE 0 58*   GLUC 101   CALCIUM 9 4   AST 24   ALT 30   ALKPHOS 98   TP 7 6   ALB 3 6   TBILI 0 28   VAP2XSSDHHAL 1 990     Drug Screen:   Lab Results   Component Value Date    AMPMETHUR Negative 02/09/2022    BARBTUR Negative 02/09/2022    BDZUR Negative 02/09/2022    THCUR Negative 02/09/2022    COCAINEUR Negative 02/09/2022    METHADONEUR Negative 02/09/2022    OPIATEUR Negative 02/09/2022    PCPUR Negative 02/09/2022     Medication Drug Levels: No results found for: CBMZFREE, PHENOBARB, PHENYTOIN, VALPROICTOT, CARBAMAZEPIN, LAMOTRIGINE, LEVETIRACETA, TOPIRAMATE    Code Status: )Prior    Assessment/Plan     Assessment:  Giovanni Presley is a 64 y o  female with a h/o schizoaffective disorder who presented to ED complaining of worsening confusion, not able to taking care of herself and not taking her meds for at least a week  Diagnosis:  Schizoaffective disorder depressed type  Plan:   Continue medical management   Patient does not need psychiatric admission at this point, she is not psychotic, no SI/HI  Restart Geodon 60 mg after dinner today and Paxil 60 mg daily from tomorrow  Risks, benefits and possible side effects of Medications:   Risks, benefits, and possible side effects of medications explained to patient and patient verbalizes understanding           Baltazar Beckman MD

## 2022-02-09 NOTE — ASSESSMENT & PLAN NOTE
Self neglect: possible depression, she is in a cold house as mother did not call for Heat repair  They would need outpatient CM to assist for heating in the house     Possibly due to not taking her medications, UTI and possible depression   Patient is not able to care for herself  Psych evaluation   CM consult   Possible rehab/fixing home issues and VNA to check up on daughter and mother

## 2022-02-09 NOTE — ASSESSMENT & PLAN NOTE
Hyponatremia: patient has hyponatremia to 131  She has confusion as she is not taking her psych medications for 2-3 days and not eating, having loose stools  Possible due to hypovolemic hyponatremia, she has history  SSRI vs  SIADH     BMP in the morning  Will give gentle hydration and monitor progression  of sodium   Goal is 135-145  Raise of 10 meq over 24 hours only

## 2022-02-09 NOTE — ED ATTENDING ATTESTATION
Final Diagnosis:  1  Acute encephalopathy    2  Nausea and vomiting    3  Scab    4  Elevated blood pressure reading    5  Anemia    6  Hyponatremia    7  Hypokalemia    8  UTI (urinary tract infection)      ED Course as of 02/10/22 1659   Wed Feb 09, 2022   0821 63 y/o F who can't care of herself  This has been going on for 3 days  +nausea  +vomiting a few times  6212 General: VS reviewed  Appears in NAD  awake, alert  Well-nourished, well-developed  Appears stated age  Speaking normally in full sentences  Head: Normocephalic, atraumatic  Eyes: EOM-I  No diplopia  No hyphema  No subconjunctival hemorrhages  Symmetrical lids  ENT: Atraumatic external nose and ears  MMM  No malocclusion  No stridor  Normal phonation  No drooling  Normal swallowing  Neck: No JVD  CV: No pallor noted  Lungs:   No tachypnea  No respiratory distress  MSK:   FROM spontaneously  Skin: Dry, scattered excoriation  Neuro: Awake, alert, GCS15, CN II-XII grossly intact  Motor grossly intact  Psychiatric/Behavioral: Appropriate mood and affect   Exam: deferred   0821 A/P:  - CRISIs    1227 Bacteria, UA: Occasional   1228 Leukocytes, UA: Negative   1228 Nitrite, UA(!): Positive       I, Emre Luke MD, saw and evaluated the patient  All available labs and X-rays were ordered by me or the resident and have been reviewed by myself  I discussed the patient with the resident / non-physician and agree with the resident's / non-physician practitioner's findings and plan as documented in the resident's / non-physician practicitioner's note, except where noted  At this point, I agree with the current assessment done in the ED  I was present during key portions of all procedures performed unless otherwise stated  Chief Complaint   Patient presents with    Medication Problem     Pt reports she has not been taking her psychiatric medications for the past few days       PMH:   has a past medical history of Anxiety, Arthritis, Back pain at L4-L5 level, Schreiber esophagus, Bulimia, Colon polyp, Disease of thyroid gland, Ear problems, GERD (gastroesophageal reflux disease), History of transfusion, Hyperlipidemia, Hypothyroidism, Leukopenia, Nasal congestion, NMS (neuroleptic malignant syndrome) (01/03/2020), Pneumonia, Rheumatoid arthritis involving right hip ([facility] [address] ), Sciatica, Seizure ([facility] [address] ), Seizures ([facility] [address] ), Synovial cyst of lumbar spine, Vertigo, and Weight gain  PSH:   has a past surgical history that includes Rhinoplasty; Bone marrow biopsy; Colonoscopy; pr total hip arthroplasty (Right, 12/11/2019); Hip Arthroplasty (Right, 1/2/2020); Sinus surgery; Cosmetic surgery; Closed reduction distal femur fracture; pr lap, repair paraesophageal hernia, incl fundoplasty w/ mesh (N/A, 5/11/2021); pr esophagogastroduodenoscopy transoral diagnostic (N/A, 5/11/2021); pr esophagus surg procedure unlisted (N/A, 5/11/2021); Transoral incisionless fundoplication (TIF) (59/75/0562); Breast surgery; and Dental surgery  Social:  Social History     Substance and Sexual Activity   Alcohol Use Not Currently     Social History     Tobacco Use   Smoking Status Never Smoker   Smokeless Tobacco Never Used     Social History     Substance and Sexual Activity   Drug Use Not Currently     PE:  Vitals:    02/10/22 0611 02/10/22 0748 02/10/22 0749 02/10/22 1507   BP: 158/95 109/74 109/74 113/74   Pulse: 104 97 98 (!) 107   Resp:       Temp:  98 4 °F (36 9 °C) 98 4 °F (36 9 °C) 99 2 °F (37 3 °C)   TempSrc:       SpO2: 97% 97% 96% 96%   Weight:       Height:         - 13 point ROS was performed and all are normal unless stated in the history above  - Nursing note reviewed  Vitals reviewed  - Orders placed by myself and/or advanced practitioner / resident     - Previous chart was reviewed  - No language barrier    - History obtained from patient  - There are no limitations to the history obtained     - Critical care time: Not applicable for this patient       Code Status: Level 1 - Full Code  Advance Directive and Living Will:      Power of :    POLST:      Medications   acetaminophen (TYLENOL) tablet 975 mg (975 mg Oral Given 2/10/22 1507)   levothyroxine tablet 25 mcg (25 mcg Oral Given 2/10/22 0612)   LORazepam (ATIVAN) tablet 2 mg (2 mg Oral Given 2/9/22 2235)   pantoprazole (PROTONIX) EC tablet 40 mg (40 mg Oral Given 2/10/22 0808)   PARoxetine (PAXIL) tablet 60 mg (60 mg Oral Given 2/10/22 0808)   triamcinolone (KENALOG) 0 1 % cream (has no administration in time range)   acetaminophen (TYLENOL) tablet 650 mg ( Oral Canceled Entry 2/10/22 1506)   polyethylene glycol (MIRALAX) packet 17 g (17 g Oral Refused 2/10/22 0808)   ondansetron (ZOFRAN) injection 4 mg (has no administration in time range)   calcium carbonate (TUMS) chewable tablet 1,000 mg (1,000 mg Oral Given 2/9/22 2105)   enoxaparin (LOVENOX) subcutaneous injection 40 mg (40 mg Subcutaneous Given 2/10/22 0807)   hydrALAZINE (APRESOLINE) tablet 10 mg (10 mg Oral Given 2/10/22 1511)   ziprasidone (GEODON) capsule 60 mg (60 mg Oral Given 2/9/22 2105)   ferrous sulfate tablet 325 mg (has no administration in time range)   sodium chloride 0 9 % bolus 500 mL (0 mL Intravenous Stopped 2/9/22 1623)   potassium chloride oral solution 40 mEq (40 mEq Oral Given 2/9/22 0917)   cephalexin (KEFLEX) capsule 500 mg (500 mg Oral Given 2/9/22 1241)     No orders to display     Orders Placed This Encounter   Procedures    Blood culture    Blood culture    CBC and differential    Comprehensive metabolic panel    Rapid drug screen, urine    TSH, 3rd generation    UA w Reflex to Microscopic w Reflex to Culture    HS Troponin 0hr (reflex protocol)    Urine Microscopic    Comprehensive metabolic panel    Magnesium    Phosphorus    CBC and differential    Platelet count    Diet Regular; Regular House; Fluid Restriction 2000 ML    Insert peripheral IV    Straight cath    Nursing communication Continue IV as ordered      Notify admitting physician    Notify admitting physician on arrival    Vital Signs per unit routine    Up and OOB as tolerated    Daily weights    I/O    Insert peripheral IV    Maintain IV access    Apply SCD or Foot pumps    Level 1-Full Code: all life saving measures are indicated    Consult to ED crisis worker    Inpatient consult to Psychiatry    Inpatient consult to Case Management    OT eval and treat    PT eval and treat    EKG RESULTS    POCT alcohol breath test    ECG 12 lead    ECG 12 lead    Place in Observation    Place in Observation     Labs Reviewed   CBC AND DIFFERENTIAL - Abnormal       Result Value Ref Range Status    WBC 4 55  4 31 - 10 16 Thousand/uL Final    RBC 4 99  3 81 - 5 12 Million/uL Final    Hemoglobin 9 7 (*) 11 5 - 15 4 g/dL Final    Hematocrit 34 2 (*) 34 8 - 46 1 % Final    MCV 69 (*) 82 - 98 fL Final    MCH 19 4 (*) 26 8 - 34 3 pg Final    MCHC 28 4 (*) 31 4 - 37 4 g/dL Final    RDW 20 1 (*) 11 6 - 15 1 % Final    MPV 9 0  8 9 - 12 7 fL Final    Platelets 083 (*) 017 - 390 Thousands/uL Final    nRBC 0  /100 WBCs Final    Neutrophils Relative 76 (*) 43 - 75 % Final    Immat GRANS % 1  0 - 2 % Final    Lymphocytes Relative 12 (*) 14 - 44 % Final    Monocytes Relative 8  4 - 12 % Final    Eosinophils Relative 2  0 - 6 % Final    Basophils Relative 1  0 - 1 % Final    Neutrophils Absolute 3 51  1 85 - 7 62 Thousands/µL Final    Immature Grans Absolute 0 04  0 00 - 0 20 Thousand/uL Final    Lymphocytes Absolute 0 53 (*) 0 60 - 4 47 Thousands/µL Final    Monocytes Absolute 0 34  0 17 - 1 22 Thousand/µL Final    Eosinophils Absolute 0 08  0 00 - 0 61 Thousand/µL Final    Basophils Absolute 0 05  0 00 - 0 10 Thousands/µL Final   COMPREHENSIVE METABOLIC PANEL - Abnormal    Sodium 131 (*) 136 - 145 mmol/L Final    Potassium 3 2 (*) 3 5 - 5 3 mmol/L Final    Chloride 98 (*) 100 - 108 mmol/L Final    CO2 26  21 - 32 mmol/L Final    ANION GAP 7  4 - 13 mmol/L Final    BUN 9  5 - 25 mg/dL Final    Creatinine 0 58 (*) 0 60 - 1 30 mg/dL Final    Comment: Standardized to IDMS reference method    Glucose 101  65 - 140 mg/dL Final    Comment: If the patient is fasting, the ADA then defines impaired fasting glucose as > 100 mg/dL and diabetes as > or equal to 123 mg/dL  Specimen collection should occur prior to Sulfasalazine administration due to the potential for falsely depressed results  Specimen collection should occur prior to Sulfapyridine administration due to the potential for falsely elevated results  Calcium 9 4  8 3 - 10 1 mg/dL Final    AST 24  5 - 45 U/L Final    Comment: Specimen collection should occur prior to Sulfasalazine administration due to the potential for falsely depressed results  ALT 30  12 - 78 U/L Final    Comment: Specimen collection should occur prior to Sulfasalazine and/or Sulfapyridine administration due to the potential for falsely depressed results  Alkaline Phosphatase 98  46 - 116 U/L Final    Total Protein 7 6  6 4 - 8 2 g/dL Final    Albumin 3 6  3 5 - 5 0 g/dL Final    Total Bilirubin 0 28  0 20 - 1 00 mg/dL Final    Comment: Use of this assay is not recommended for patients undergoing treatment with eltrombopag due to the potential for falsely elevated results      eGFR 99  ml/min/1 73sq m Final    Narrative:     [place] guidelines for Chronic Kidney Disease (CKD):     Stage 1 with normal or high GFR (GFR > 90 mL/min/1 73 square meters)    Stage 2 Mild CKD (GFR = 60-89 mL/min/1 73 square meters)    Stage 3A Moderate CKD (GFR = 45-59 mL/min/1 73 square meters)    Stage 3B Moderate CKD (GFR = 30-44 mL/min/1 73 square meters)    Stage 4 Severe CKD (GFR = 15-29 mL/min/1 73 square meters)    Stage 5 End Stage CKD (GFR <15 mL/min/1 73 square meters)  Note: GFR calculation is accurate only with a steady state creatinine   UA W REFLEX TO MICROSCOPIC WITH REFLEX TO CULTURE - Abnormal    Color, UA Yellow   Final    Clarity, UA Clear Final    Specific Welches, UA 1 010  1 003 - 1 030 Final    pH, UA 7 5  4 5, 5 0, 5 5, 6 0, 6 5, 7 0, 7 5, 8 0 Final    Leukocytes, UA Negative  Negative Final    Nitrite, UA Positive (*) Negative Final    Protein, UA Negative  Negative mg/dl Final    Glucose, UA Negative  Negative mg/dl Final    Ketones, UA Negative  Negative mg/dl Final    Urobilinogen, UA 0 2  0 2, 1 0 E U /dl E U /dl Final    Bilirubin, UA Negative  Negative Final    Blood, UA Negative   Final   URINE MICROSCOPIC - Abnormal    RBC, UA None Seen  None Seen, 2-4 /hpf Final    WBC, UA 4-10 (*) None Seen, 2-4, 5-60 /hpf Final    Epithelial Cells None Seen  None Seen, Occasional /hpf Final    Bacteria, UA Occasional  None Seen, Occasional /hpf Final    Hyaline Casts, UA None Seen  None Seen /lpf Final   RAPID DRUG SCREEN, URINE - Normal    Amph/Meth UR Negative  Negative Final    Barbiturate Ur Negative  Negative Final    Benzodiazepine Urine Negative  Negative Final    Cocaine Urine Negative  Negative Final    Methadone Urine Negative  Negative Final    Opiate Urine Negative  Negative Final    PCP Ur Negative  Negative Final    THC Urine Negative  Negative Final    Oxycodone Urine Negative  Negative Final    Narrative:     FOR MEDICAL PURPOSES ONLY  IF CONFIRMATION NEEDED PLEASE CONTACT THE LAB WITHIN 5 DAYS      Drug Screen Cutoff Levels:  AMPHETAMINE/METHAMPHETAMINES  1000 ng/mL  BARBITURATES     200 ng/mL  BENZODIAZEPINES     200 ng/mL  COCAINE      300 ng/mL  METHADONE      300 ng/mL  OPIATES      300 ng/mL  PHENCYCLIDINE     25 ng/mL  THC       50 ng/mL  OXYCODONE      100 ng/mL   TSH, 3RD GENERATION - Normal    TSH 3RD GENERATON 1 990  0 358 - 3 740 uIU/mL Final    Comment: The recommended reference ranges for TSH during pregnancy are as follows:   First trimester 0 1 to 2 5 uIU/mL   Second trimester  0 2 to 3 0 uIU/mL   Third trimester 0 3 to 3 0 uIU/m    Note: Normal ranges may not apply to patients who are transgender, non-binary, or whose legal sex, sex at birth, and gender identity differ  Narrative:     Patients undergoing fluorescein dye angiography may retain small amounts of fluorescein in the body for 48-72 hours post procedure  Samples containing fluorescein can produce falsely depressed TSH values  If the patient had this procedure,a specimen should be resubmitted post fluorescein clearance  HS TROPONIN I 0HR - Normal    hs TnI 0hr 3  "Refer to ACS Flowchart"- see link ng/L Final    Comment:                                              Initial (time 0) result  If >=50 ng/L, Myocardial injury suggested ;  Type of myocardial injury and treatment strategy  to be determined  If 5-49 ng/L, a delta result at 2 hours and or 4 hours will be needed to further evaluate  If <4 ng/L, and chest pain has been >3 hours since onset, patient may qualify for discharge based on the HEART score in the ED  If <5 ng/L and <3hours since onset of chest pain, a delta result at 2 hours will be needed to further evaluate  Second Troponin (time 2 hours)  If calculated delta >= 20 ng/L,  Myocardial injury suggested ; Type of myocardial injury and treatment strategy to be determined  If 5-49 ng/L and the calculated delta is 5-19 ng/L, consult medical service for evaluation  Continue evaluation for ischemia on ecg and other possible etiology and repeat hs troponin at 4 hours  If delta is <5 ng/L at 2 hours, consider discharge based on risk stratification via the HEART score (if in ED), or JERRY risk score in IP/Observation     POCT ALCOHOL BREATH TEST - Normal    EXTBreath Alcohol 0 000   Final   PLATELET COUNT     Time reflects when diagnosis was documented in both MDM as applicable and the Disposition within this note       Time User Action Codes Description Comment    2/9/2022  8:14 AM Gianna Boop A Add [R11 2] Nausea and vomiting     2/9/2022  8:14 AM Gianna Boop A Add [R41 0] Confusion     2/9/2022  8:14 AM Gianna Boop Modify [R11 2] Nausea and vomiting     2/9/2022  8:14 AM Jose F Beers A Modify [R41 0] Confusion     2/9/2022  8:14 AM Evia Passy, Chloé Blower A Add [R23 4] Scab     2/9/2022  8:59 AM Legare, Chloé Blower A Add [R03 0] Elevated blood pressure reading     2/9/2022  8:59 AM Jose F Beers A Add [D64 9] Anemia     2/9/2022  9:04 AM Evia Passy, Chloé Blower A Add [E87 1] Hyponatremia     2/9/2022  9:04 AM Jose F Beers A Add [E87 6] Hypokalemia     2/9/2022 12:28 PM Legare, Chloé Blower A Add [N39 0] UTI (urinary tract infection)     2/9/2022 12:59 PM Jose F Beers A Add [G93 40] Acute encephalopathy     2/9/2022 12:59 PM Jose F Beers A Modify [R41 0] Confusion     2/9/2022 12:59 PM Jose F Beers A Modify [G93 40] Acute encephalopathy     2/9/2022 12:59 PM Jose F Beers A Remove [R41 0] Confusion           ED Disposition       ED Disposition Condition Date/Time Comment    Admit Stable Wed Feb 9, 2022 12:59 PM Case was discussed with CHERYL and the patient's admission status was agreed to be Admission Status: observation status to the service of Dr Lisa Mccain               Follow-up Information    None       Current Discharge Medication List        CONTINUE these medications which have NOT CHANGED    Details   acetaminophen (TYLENOL) 325 mg tablet Take 3 tablets (975 mg total) by mouth every 8 (eight) hours  Qty: 30 tablet, Refills: 0    Associated Diagnoses: Hiatal hernia with GERD and esophagitis      ferrous sulfate 325 (65 Fe) mg tablet Take 1 tablet (325 mg total) by mouth 2 (two) times a day with meals  Qty: 60 tablet, Refills: 0    Associated Diagnoses: Anemia, unspecified type      levothyroxine 25 mcg tablet Take 1 tablet (25 mcg total) by mouth daily  Qty: 90 tablet, Refills: 3    Associated Diagnoses: Acquired hypothyroidism      LORazepam (ATIVAN) 2 mg tablet Take 1 tablet (2 mg total) by mouth every 6 (six) hours as needed for anxiety  Qty: 16 tablet, Refills: 0    Comments: F41 9  Associated Diagnoses: Anxiety      ondansetron (ZOFRAN) 4 mg tablet Take 1 tablet (4 mg total) by mouth every 8 (eight) hours as needed for nausea or vomiting  Qty: 20 tablet, Refills: 0    Associated Diagnoses: Nausea      pantoprazole (PROTONIX) 40 mg tablet Take 1 tablet (40 mg total) by mouth 2 (two) times a day  Qty: 60 tablet, Refills: 0    Associated Diagnoses: Erosive esophagitis      PARoxetine (PAXIL) 30 mg tablet TAKE 2 TABLETS BY MOUTH DAILY  Qty: 180 tablet, Refills: 3    Associated Diagnoses: Anxiety      triamcinolone (KENALOG) 0 1 % cream Apply topically 2 (two) times a day as needed for rash  Qty: 160 g, Refills: 1    Associated Diagnoses: Rash      ziprasidone (GEODON) 80 mg capsule TAKE 1 CAPSULE BY MOUTH EVERY DAY  Qty: 90 capsule, Refills: 3    Associated Diagnoses: ABIMAEL (generalized anxiety disorder)           No discharge procedures on file  Prior to Admission Medications   Prescriptions Last Dose Informant Patient Reported? Taking?    LORazepam (ATIVAN) 2 mg tablet   No No   Sig: Take 1 tablet (2 mg total) by mouth every 6 (six) hours as needed for anxiety   PARoxetine (PAXIL) 30 mg tablet   No No   Sig: TAKE 2 TABLETS BY MOUTH DAILY   acetaminophen (TYLENOL) 325 mg tablet  Self No No   Sig: Take 3 tablets (975 mg total) by mouth every 8 (eight) hours   ferrous sulfate 325 (65 Fe) mg tablet   No No   Sig: Take 1 tablet (325 mg total) by mouth 2 (two) times a day with meals   levothyroxine 25 mcg tablet  Self No No   Sig: Take 1 tablet (25 mcg total) by mouth daily   ondansetron (ZOFRAN) 4 mg tablet   No No   Sig: Take 1 tablet (4 mg total) by mouth every 8 (eight) hours as needed for nausea or vomiting   pantoprazole (PROTONIX) 40 mg tablet   No No   Sig: Take 1 tablet (40 mg total) by mouth 2 (two) times a day   triamcinolone (KENALOG) 0 1 % cream   No No   Sig: Apply topically 2 (two) times a day as needed for rash   ziprasidone (GEODON) 80 mg capsule   No No   Sig: TAKE 1 CAPSULE BY MOUTH EVERY DAY      Facility-Administered Medications: None       Portions of the record may have been created with voice recognition software  Occasional wrong word or "sound a like" substitutions may have occurred due to the inherent limitations of voice recognition software  Read the chart carefully and recognize, using context, where substitutions have occurred      Electronically signed by:  Nallely Rojo

## 2022-02-09 NOTE — ASSESSMENT & PLAN NOTE
Schizoaffective disorder  Probably depression in the setting of UTI? And hyponatremia, non compliance with medications in setting of self neglect and not having heat at home     Continue with Seroquel, Geodon

## 2022-02-10 LAB
ALBUMIN SERPL BCP-MCNC: 3.4 G/DL (ref 3.5–5)
ALP SERPL-CCNC: 93 U/L (ref 46–116)
ALT SERPL W P-5'-P-CCNC: 28 U/L (ref 12–78)
ANION GAP SERPL CALCULATED.3IONS-SCNC: 6 MMOL/L (ref 4–13)
AST SERPL W P-5'-P-CCNC: 17 U/L (ref 5–45)
BASOPHILS # BLD AUTO: 0.08 THOUSANDS/ΜL (ref 0–0.1)
BASOPHILS NFR BLD AUTO: 1 % (ref 0–1)
BILIRUB SERPL-MCNC: 0.28 MG/DL (ref 0.2–1)
BUN SERPL-MCNC: 8 MG/DL (ref 5–25)
CALCIUM ALBUM COR SERPL-MCNC: 9.7 MG/DL (ref 8.3–10.1)
CALCIUM SERPL-MCNC: 9.2 MG/DL (ref 8.3–10.1)
CHLORIDE SERPL-SCNC: 106 MMOL/L (ref 100–108)
CO2 SERPL-SCNC: 25 MMOL/L (ref 21–32)
CREAT SERPL-MCNC: 0.68 MG/DL (ref 0.6–1.3)
EOSINOPHIL # BLD AUTO: 0.29 THOUSAND/ΜL (ref 0–0.61)
EOSINOPHIL NFR BLD AUTO: 5 % (ref 0–6)
ERYTHROCYTE [DISTWIDTH] IN BLOOD BY AUTOMATED COUNT: 20.4 % (ref 11.6–15.1)
GFR SERPL CREATININE-BSD FRML MDRD: 94 ML/MIN/1.73SQ M
GLUCOSE SERPL-MCNC: 97 MG/DL (ref 65–140)
HCT VFR BLD AUTO: 34.8 % (ref 34.8–46.1)
HGB BLD-MCNC: 9.7 G/DL (ref 11.5–15.4)
IMM GRANULOCYTES # BLD AUTO: 0.03 THOUSAND/UL (ref 0–0.2)
IMM GRANULOCYTES NFR BLD AUTO: 1 % (ref 0–2)
LYMPHOCYTES # BLD AUTO: 1.09 THOUSANDS/ΜL (ref 0.6–4.47)
LYMPHOCYTES NFR BLD AUTO: 19 % (ref 14–44)
MAGNESIUM SERPL-MCNC: 2.1 MG/DL (ref 1.6–2.6)
MCH RBC QN AUTO: 19.5 PG (ref 26.8–34.3)
MCHC RBC AUTO-ENTMCNC: 27.9 G/DL (ref 31.4–37.4)
MCV RBC AUTO: 70 FL (ref 82–98)
MONOCYTES # BLD AUTO: 0.64 THOUSAND/ΜL (ref 0.17–1.22)
MONOCYTES NFR BLD AUTO: 11 % (ref 4–12)
NEUTROPHILS # BLD AUTO: 3.7 THOUSANDS/ΜL (ref 1.85–7.62)
NEUTS SEG NFR BLD AUTO: 63 % (ref 43–75)
NRBC BLD AUTO-RTO: 0 /100 WBCS
PHOSPHATE SERPL-MCNC: 2.9 MG/DL (ref 2.3–4.1)
PLATELET # BLD AUTO: 461 THOUSANDS/UL (ref 149–390)
PMV BLD AUTO: 8.7 FL (ref 8.9–12.7)
POTASSIUM SERPL-SCNC: 3.9 MMOL/L (ref 3.5–5.3)
PROT SERPL-MCNC: 7.1 G/DL (ref 6.4–8.2)
RBC # BLD AUTO: 4.98 MILLION/UL (ref 3.81–5.12)
SODIUM SERPL-SCNC: 137 MMOL/L (ref 136–145)
WBC # BLD AUTO: 5.83 THOUSAND/UL (ref 4.31–10.16)

## 2022-02-10 PROCEDURE — 99213 OFFICE O/P EST LOW 20 MIN: CPT | Performed by: PSYCHIATRY & NEUROLOGY

## 2022-02-10 PROCEDURE — 97166 OT EVAL MOD COMPLEX 45 MIN: CPT

## 2022-02-10 PROCEDURE — 97162 PT EVAL MOD COMPLEX 30 MIN: CPT

## 2022-02-10 PROCEDURE — 87040 BLOOD CULTURE FOR BACTERIA: CPT | Performed by: INTERNAL MEDICINE

## 2022-02-10 PROCEDURE — 84100 ASSAY OF PHOSPHORUS: CPT | Performed by: INTERNAL MEDICINE

## 2022-02-10 PROCEDURE — 83735 ASSAY OF MAGNESIUM: CPT | Performed by: INTERNAL MEDICINE

## 2022-02-10 PROCEDURE — 80053 COMPREHEN METABOLIC PANEL: CPT | Performed by: INTERNAL MEDICINE

## 2022-02-10 PROCEDURE — 85025 COMPLETE CBC W/AUTO DIFF WBC: CPT | Performed by: INTERNAL MEDICINE

## 2022-02-10 PROCEDURE — 99225 PR SBSQ OBSERVATION CARE/DAY 25 MINUTES: CPT | Performed by: INTERNAL MEDICINE

## 2022-02-10 RX ORDER — FERROUS SULFATE 325(65) MG
325 TABLET ORAL EVERY OTHER DAY
Status: DISCONTINUED | OUTPATIENT
Start: 2022-02-11 | End: 2022-02-11 | Stop reason: HOSPADM

## 2022-02-10 RX ADMIN — ACETAMINOPHEN 975 MG: 325 TABLET, FILM COATED ORAL at 15:07

## 2022-02-10 RX ADMIN — HYDRALAZINE HYDROCHLORIDE 10 MG: 10 TABLET, FILM COATED ORAL at 06:14

## 2022-02-10 RX ADMIN — ZIPRASIDONE HYDROCHLORIDE 60 MG: 20 CAPSULE ORAL at 18:12

## 2022-02-10 RX ADMIN — HYDRALAZINE HYDROCHLORIDE 10 MG: 10 TABLET, FILM COATED ORAL at 15:11

## 2022-02-10 RX ADMIN — ACETAMINOPHEN 975 MG: 325 TABLET, FILM COATED ORAL at 06:12

## 2022-02-10 RX ADMIN — ACETAMINOPHEN 975 MG: 325 TABLET, FILM COATED ORAL at 21:35

## 2022-02-10 RX ADMIN — ENOXAPARIN SODIUM 40 MG: 40 INJECTION SUBCUTANEOUS at 08:07

## 2022-02-10 RX ADMIN — PANTOPRAZOLE SODIUM 40 MG: 40 TABLET, DELAYED RELEASE ORAL at 08:08

## 2022-02-10 RX ADMIN — PAROXETINE 60 MG: 20 TABLET, FILM COATED ORAL at 08:08

## 2022-02-10 RX ADMIN — PANTOPRAZOLE SODIUM 40 MG: 40 TABLET, DELAYED RELEASE ORAL at 18:12

## 2022-02-10 RX ADMIN — FERROUS SULFATE TAB 325 MG (65 MG ELEMENTAL FE) 325 MG: 325 (65 FE) TAB at 08:08

## 2022-02-10 RX ADMIN — LEVOTHYROXINE SODIUM 25 MCG: 25 TABLET ORAL at 06:12

## 2022-02-10 RX ADMIN — HYDRALAZINE HYDROCHLORIDE 10 MG: 10 TABLET, FILM COATED ORAL at 21:36

## 2022-02-10 NOTE — ASSESSMENT & PLAN NOTE
Schizoaffective disorder  Probably depression, And hyponatremia, non compliance with medications in setting of self neglect and not having heat at home     Continue with Seroquel, Geodon

## 2022-02-10 NOTE — PROGRESS NOTES
[facility]  Progress Note - Yumiko Lozano 1960, 64 y o  female MRN: [id number]  Unit/Bed#: [facility] 933-01 Encounter: [id number]  Primary Care Provider: Saray Erickson MD   Date and time admitted to hospital: 2/9/2022  7:32 AM    Hyponatremia  Assessment & Plan  Hyponatremia: patient has hyponatremia to 131  She has confusion as she is not taking her psych medications for 2-3 days and not eating, having loose stools  Possible due to hypovolemic hyponatremia, she has history  siad    -will stop fluids and monitor sodium     Hypokalemia  Assessment & Plan  Possibly related loose bm she has and not eating   Would provide and continue with regular diet  Replace potassium and recheck in the am   Check magnesium levels    Essential hypertension  Assessment & Plan  Essential hypertension   Not on medications at home  Will start hydralazine 10 mg tid, monitor hold for sbp<140    Schizoaffective disorder ([facility] [address] )  Assessment & Plan  Schizoaffective disorder  Probably depression, And hyponatremia, non compliance with medications in setting of self neglect and not having heat at home  Continue with Seroquel, Geodon      Acquired hypothyroidism  Assessment & Plan  Hypothyroidism  Continue with levothyroxine  TSH is normal limits    * Self neglect  Assessment & Plan  Self neglect: possible depression, she is in a cold house as mother did not call for Heat repair  They would need outpatient CM to assist for heating in the house  Possibly due to not taking her medications, UTI and possible depression   Patient is not able to care for herself, which she was able before     Psych evaluation   CM consult   Possible rehab/fixing home issues and VNA to check up on daughter and mother     2/10/22:  Plan for safe discharge tomorrow, patient now obtain heat at her home and has been deemed medically stable by Psychiatry, she does not require any inpatient admission and has improved with resumption of her psychiatric medications          VTE Pharmacologic Prophylaxis: VTE Score: 5 High Risk (Score >/= 5) - Pharmacological DVT Prophylaxis Ordered: enoxaparin (Lovenox)  Sequential Compression Devices Ordered  Patient Centered Rounds: I performed bedside rounds with nursing staff today  Discussions with Specialists or Other Care Team Provider: n/a    Education and Discussions with Family / Patient: Patient declined call to   Time Spent for Care: 30 minutes  More than 50% of total time spent on counseling and coordination of care as described above  Current Length of Stay: 0 day(s)  Current Patient Status: Observation   Certification Statement: The patient will continue to require additional inpatient hospital stay due to Pending discharge tomorrow  Discharge Plan: Anticipate discharge tomorrow to home with home services  Code Status: Level 1 - Full Code    Subjective:   Patient reports feeling much better today, denies any acute complaints    Objective:     Vitals:   Temp (24hrs), Av 5 °F (36 9 °C), Min:97 8 °F (36 6 °C), Max:99 2 °F (37 3 °C)    Temp:  [97 8 °F (36 6 °C)-99 2 °F (37 3 °C)] 99 2 °F (37 3 °C)  HR:  [] 107  Resp:  [20] 20  BP: (109-158)/(74-95) 113/74  SpO2:  [96 %-97 %] 96 %  Body mass index is 28 91 kg/m²  Input and Output Summary (last 24 hours): Intake/Output Summary (Last 24 hours) at 2/10/2022 1732  Last data filed at 2/10/2022 1321  Gross per 24 hour   Intake 1200 ml   Output --   Net 1200 ml       Physical Exam:   Physical Exam  Vitals and nursing note reviewed  Constitutional:       General: She is not in acute distress  Appearance: She is well-developed  She is not ill-appearing, toxic-appearing or diaphoretic  HENT:      Head: Normocephalic and atraumatic  Eyes:      General: No scleral icterus  Conjunctiva/sclera: Conjunctivae normal    Cardiovascular:      Rate and Rhythm: Normal rate and regular rhythm        Heart sounds: No murmur heard  No friction rub  No gallop  Pulmonary:      Effort: Pulmonary effort is normal  No respiratory distress  Breath sounds: Normal breath sounds  No stridor  No wheezing, rhonchi or rales  Chest:      Chest wall: No tenderness  Abdominal:      General: There is no distension  Palpations: Abdomen is soft  There is no mass  Tenderness: There is no abdominal tenderness  There is no guarding or rebound  Hernia: No hernia is present  Musculoskeletal:         General: No tenderness, deformity or signs of injury  Cervical back: Neck supple  Right lower leg: No edema  Left lower leg: No edema  Skin:     General: Skin is warm and dry  Coloration: Skin is not jaundiced or pale  Findings: No bruising, erythema, lesion or rash  Neurological:      Mental Status: She is alert  Additional Data:     Labs:  Results from last 7 days   Lab Units 02/10/22  0659   WBC Thousand/uL 5 83   HEMOGLOBIN g/dL 9 7*   HEMATOCRIT % 34 8   PLATELETS Thousands/uL 461*   NEUTROS PCT % 63   LYMPHS PCT % 19   MONOS PCT % 11   EOS PCT % 5     Results from last 7 days   Lab Units 02/10/22  0659   SODIUM mmol/L 137   POTASSIUM mmol/L 3 9   CHLORIDE mmol/L 106   CO2 mmol/L 25   BUN mg/dL 8   CREATININE mg/dL 0 68   ANION GAP mmol/L 6   CALCIUM mg/dL 9 2   ALBUMIN g/dL 3 4*   TOTAL BILIRUBIN mg/dL 0 28   ALK PHOS U/L 93   ALT U/L 28   AST U/L 17   GLUCOSE RANDOM mg/dL 97                       Lines/Drains:  Invasive Devices  Report    None                       Imaging: No pertinent imaging reviewed  Recent Cultures (last 7 days):   Results from last 7 days   Lab Units 02/10/22  0046 02/10/22  0045   BLOOD CULTURE  Received in Microbiology Lab  Culture in Progress  Received in Microbiology Lab  Culture in Progress         Last 24 Hours Medication List:   Current Facility-Administered Medications   Medication Dose Route Frequency Provider Last Rate    acetaminophen 650 mg Oral Q6H PRN Suzette Dang MD      acetaminophen  975 mg Oral [facility] Suzette Dang MD      calcium carbonate  1,000 mg Oral Daily PRN Suzette Dang MD      enoxaparin  40 mg Subcutaneous Daily Suzette Dang MD      [START ON 2/11/2022] ferrous sulfate  325 mg Oral Every Other Day Jose De Los Santos DO      hydrALAZINE  10 mg Oral Q8H [address] Szuette Dang MD      levothyroxine  25 mcg Oral Early Morning Suzette Dang MD      LORazepam  2 mg Oral Q6H PRN Suzette Dang MD      ondansetron  4 mg Intravenous Q6H PRN Suzette Dang MD      pantoprazole  40 mg Oral BID Suzette Dang MD      PARoxetine  60 mg Oral Daily Suzette Dang MD      polyethylene glycol  17 g Oral Daily Suzette Dang MD      triamcinolone   Topical BID PRN Suzette Dang MD      ziprasidone  60 mg Oral After Dinner Alex Patel MD          Today, Patient Was Seen By: Ghada Conner DO    **Please Note: This note may have been constructed using a voice recognition system  **

## 2022-02-10 NOTE — PLAN OF CARE
Problem: PAIN - ADULT  Goal: Verbalizes/displays adequate comfort level or baseline comfort level  Description: Interventions:  - Encourage patient to monitor pain and request assistance  - Assess pain using appropriate pain scale  - Administer analgesics based on type and severity of pain and evaluate response  - Implement non-pharmacological measures as appropriate and evaluate response  - Consider cultural and social influences on pain and pain management  - Notify physician/advanced practitioner if interventions unsuccessful or patient reports new pain  Outcome: Progressing     Problem: INFECTION - ADULT  Goal: Absence or prevention of progression during hospitalization  Description: INTERVENTIONS:  - Assess and monitor for signs and symptoms of infection  - Monitor lab/diagnostic results  - Monitor all insertion sites, i e  indwelling lines, tubes, and drains  - Monitor endotracheal if appropriate and nasal secretions for changes in amount and color  - [place] appropriate cooling/warming therapies per order  - Administer medications as ordered  - Instruct and encourage patient and family to use good hand hygiene technique  - Identify and instruct in appropriate isolation precautions for identified infection/condition  Outcome: Progressing  Goal: Absence of fever/infection during neutropenic period  Description: INTERVENTIONS:  - Monitor WBC    Outcome: Progressing     Problem: SAFETY ADULT  Goal: Patient will remain free of falls  Description: INTERVENTIONS:  - Educate patient/family on patient safety including physical limitations  - Instruct patient to call for assistance with activity   - Consult OT/PT to assist with strengthening/mobility   - Keep Call bell within reach  - Keep bed low and locked with side rails adjusted as appropriate  - Keep care items and personal belongings within reach  - Initiate and maintain comfort rounds  - Make Fall Risk Sign visible to staff  - Offer Toileting every 2 Hours, in advance of need  - Initiate/Maintain alarm  - Obtain necessary fall risk management equipment  - Apply yellow socks and bracelet for high fall risk patients  - Consider moving patient to room near nurses station  Outcome: Progressing  Goal: Maintain or return to baseline ADL function  Description: INTERVENTIONS:  -  Assess patient's ability to carry out ADLs; assess patient's baseline for ADL function and identify physical deficits which impact ability to perform ADLs (bathing, care of mouth/teeth, toileting, grooming, dressing, etc )  - Assess/evaluate cause of self-care deficits   - Assess range of motion  - Assess patient's mobility; develop plan if impaired  - Assess patient's need for assistive devices and provide as appropriate  - Encourage maximum independence but intervene and supervise when necessary  - Involve family in performance of ADLs  - Assess for home care needs following discharge   - Consider OT consult to assist with ADL evaluation and planning for discharge  - Provide patient education as appropriate  Outcome: Progressing  Goal: Maintains/Returns to pre admission functional level  Description: INTERVENTIONS:  - Perform BMAT or MOVE assessment daily    - Set and communicate daily mobility goal to care team and patient/family/caregiver  - Collaborate with rehabilitation services on mobility goals if consulted  - Perform Range of Motion   - Reposition patient every 2 hours    - Dangle patient   - Stand patient   - Ambulate patient   - Out of bed to chair   - Out of bed for meals   - Out of bed for toileting  - Record patient progress and toleration of activity level   Outcome: Progressing     Problem: DISCHARGE PLANNING  Goal: Discharge to home or other facility with appropriate resources  Description: INTERVENTIONS:  - Identify barriers to discharge w/patient and caregiver  - Arrange for needed discharge resources and transportation as appropriate  - Identify discharge learning needs (meds, wound care, etc )  - Arrange for interpretive services to assist at discharge as needed  - Refer to Case Management Department for coordinating discharge planning if the patient needs post-hospital services based on physician/advanced practitioner order or complex needs related to functional status, cognitive ability, or social support system  Outcome: Progressing     Problem: Knowledge Deficit  Goal: Patient/family/caregiver demonstrates understanding of disease process, treatment plan, medications, and discharge instructions  Description: Complete learning assessment and assess knowledge base  Interventions:  - Provide teaching at level of understanding  - Provide teaching via preferred learning methods  Outcome: Progressing     Problem: Nutrition/Hydration-ADULT  Goal: Nutrient/Hydration intake appropriate for improving, restoring or maintaining nutritional needs  Description: Monitor and assess patient's nutrition/hydration status for malnutrition  Collaborate with interdisciplinary team and initiate plan and interventions as ordered  Monitor patient's weight and dietary intake as ordered or per policy  Utilize nutrition screening tool and intervene as necessary  Determine patient's food preferences and provide high-protein, high-caloric foods as appropriate       INTERVENTIONS:  - Monitor oral intake, urinary output, labs, and treatment plans  - Assess nutrition and hydration status and recommend course of action  - Evaluate amount of meals eaten  - Assist patient with eating if necessary   - Allow adequate time for meals  - Recommend/ encourage appropriate diets, oral nutritional supplements, and vitamin/mineral supplements  - Order, calculate, and assess calorie counts as needed  - Recommend, monitor, and adjust tube feedings and TPN/PPN based on assessed needs  - Assess need for intravenous fluids  - Provide specific nutrition/hydration education as appropriate  - Include patient/family/caregiver in decisions related to nutrition  Outcome: Progressing     Problem: Potential for Falls  Goal: Patient will remain free of falls  Description: INTERVENTIONS:  - Educate patient/family on patient safety including physical limitations  - Instruct patient to call for assistance with activity   - Consult OT/PT to assist with strengthening/mobility   - Keep Call bell within reach  - Keep bed low and locked with side rails adjusted as appropriate  - Keep care items and personal belongings within reach  - Initiate and maintain comfort rounds  - Make Fall Risk Sign visible to staff  - Offer Toileting every 2 Hours, in advance of need  - Initiate/Maintain alarm  - Obtain necessary fall risk management equipment  - Apply yellow socks and bracelet for high fall risk patients  - Consider moving patient to room near nurses station  Outcome: Progressing

## 2022-02-10 NOTE — ASSESSMENT & PLAN NOTE
Hyponatremia: patient has hyponatremia to 131  She has confusion as she is not taking her psych medications for 2-3 days and not eating, having loose stools  Possible due to hypovolemic hyponatremia, she has history   siad    -will stop fluids and monitor sodium

## 2022-02-10 NOTE — PLAN OF CARE
Problem: PAIN - ADULT  Goal: Verbalizes/displays adequate comfort level or baseline comfort level  Description: Interventions:  - Encourage patient to monitor pain and request assistance  - Assess pain using appropriate pain scale  - Administer analgesics based on type and severity of pain and evaluate response  - Implement non-pharmacological measures as appropriate and evaluate response  - Consider cultural and social influences on pain and pain management  - Notify physician/advanced practitioner if interventions unsuccessful or patient reports new pain  Outcome: Progressing     Problem: INFECTION - ADULT  Goal: Absence or prevention of progression during hospitalization  Description: INTERVENTIONS:  - Assess and monitor for signs and symptoms of infection  - Monitor lab/diagnostic results  - Monitor all insertion sites, i e  indwelling lines, tubes, and drains  - Monitor endotracheal if appropriate and nasal secretions for changes in amount and color  - [place] appropriate cooling/warming therapies per order  - Administer medications as ordered  - Instruct and encourage patient and family to use good hand hygiene technique  - Identify and instruct in appropriate isolation precautions for identified infection/condition  Outcome: Progressing  Goal: Absence of fever/infection during neutropenic period  Description: INTERVENTIONS:  - Monitor WBC    Outcome: Progressing

## 2022-02-10 NOTE — ASSESSMENT & PLAN NOTE
Self neglect: possible depression, she is in a cold house as mother did not call for Heat repair  They would need outpatient CM to assist for heating in the house  Possibly due to not taking her medications, UTI and possible depression   Patient is not able to care for herself, which she was able before     Psych evaluation   CM consult   Possible rehab/fixing home issues and VNA to check up on daughter and mother     2/10/22:  Plan for safe discharge tomorrow, patient now obtain heat at her home and has been deemed medically stable by Psychiatry, she does not require any inpatient admission and has improved with resumption of her psychiatric medications

## 2022-02-10 NOTE — PHYSICIAN ADVISOR
Current patient class: Observation  The patient is currently on Hospital Day: 2 at [address] [place] [address]        The patient was admitted to the hospital  on N/A at N/A for the following diagnosis:  Hypokalemia [E87 6]  Anxiety [F41 9]  Hyponatremia [E87 1]  UTI (urinary tract infection) [N39 0]  Elevated blood pressure reading [R03 0]  Anemia [D64 9]  Nausea and vomiting [R11 2]  Scab [R23 4]  Acute encephalopathy [G93 40]     After review of the relevant documentation, labs, vital signs and test results, the patient is most appropriate for OBSERVATION CLASS  Rationale is as follows: The patient is a 64 yrs   Female who presented to the ED at 2/9/2022  7:32 AM with a chief complaint of Medication Problem (Pt reports she has not been taking her psychiatric medications for the past few days ) Patient with h/o schizoaffective disorder and has been unable to care for herself for last several days with no heat in house and not taking her meds  Psychiatry consult was performed and no need for IP psychiatry and no med changes recommended  She has been seen by PT and has no rehab needs  Case management working on discharge planning    Continue OBS and reassess if remains in the hospital     The patients vitals on arrival were   ED Triage Vitals [02/09/22 0737]   Temperature Pulse Respirations Blood Pressure SpO2   98 3 °F (36 8 °C) 80 18 (!) 173/94 98 %      Temp Source Heart Rate Source Patient Position - Orthostatic VS BP Location FiO2 (%)   Oral Monitor Lying Right arm --      Pain Score       No Pain           Past Medical History:   Diagnosis Date    Anxiety     Arthritis     Back pain at L4-L5 level     Schreiber esophagus     Bulimia     Colon polyp     Disease of thyroid gland     hypothyroid    Ear problems     GERD (gastroesophageal reflux disease)     History of transfusion     Hyperlipidemia     Hypothyroidism     Leukopenia     Nasal congestion     NMS (neuroleptic malignant syndrome) 01/03/2020    Pneumonia     Rheumatoid arthritis involving right hip (HCC)     Sciatica     Seizure ([facility] [address] )     due to medication mix up 8/2018 only one historically    Seizures ([facility] [address]     Synovial cyst of lumbar spine     Vertigo     Weight gain      Past Surgical History:   Procedure Laterality Date    BONE MARROW BIOPSY      BREAST SURGERY      enlargement with implants    CLOSED REDUCTION DISTAL FEMUR FRACTURE      COLONOSCOPY      COSMETIC SURGERY      DENTAL SURGERY      HIP ARTHROPLASTY Right 1/2/2020    Procedure: REVISION TOTAL HIP ARTHROPLASTY WITH REPAIR OF PARIPROSTHETIC FRACTURE - POSTERIOR APPROACH;  Surgeon: Ernesto Dominguez MD;  [facility];  Service: Orthopedics    CA ESOPHAGOGASTRODUODENOSCOPY TRANSORAL DIAGNOSTIC N/A 5/11/2021    Procedure: ESOPHAGOGASTRODUODENOSCOPY (EGD); Surgeon: Lupillo Partida MD;  [facility];  Service: General    CA ESOPHAGUS SURG PROCEDURE UNLISTED N/A 5/11/2021    Procedure: FUNDOPLICATION TRANSORAL INCISIONLESS;  Surgeon: Lindsey Ray MD;  [facility];  Service: Gastroenterology    CA LAP, REPAIR PARAESOPHAGEAL HERNIA, INCL FUNDOPLASTY W/ MESH N/A 5/11/2021    Procedure: REPAIR HERNIA PARAESOPHAGEAL  LAPAROSCOPIC;  Surgeon:  Lupillo Partida MD;  [facility];  Service: General    CA TOTAL HIP ARTHROPLASTY Right 12/11/2019    Procedure: ARTHROPLASTY HIP TOTAL ANTERIOR;  Surgeon: Ernesto Dominguez MD;  [facility];  Service: Orthopedics    RHINOPLASTY      SINUS SURGERY      TRANSORAL INCISIONLESS FUNDOPLICATION (TIF)  08/12/2770           Consults have been placed to:   IP CONSULT TO ED CRISIS WORKER  IP CONSULT TO PSYCHIATRY  IP CONSULT TO CASE MANAGEMENT    Vitals:    02/10/22 0611 02/10/22 0748 02/10/22 0749 02/10/22 1507   BP: 158/95 109/74 109/74 113/74   Pulse: 104 97 98 (!) 107   Resp:       Temp:  98 4 °F (36 9 °C) 98 4 °F (36 9 °C) 99 2 °F (37 3 °C)   TempSrc: SpO2: 97% 97% 96% 96%   Weight:       Height:           Most recent labs:    Recent Labs     02/10/22  0659   WBC 5 83   HGB 9 7*   HCT 34 8   *   K 3 9   CALCIUM 9 2   BUN 8   CREATININE 0 68   AST 17   ALT 28   ALKPHOS 93       Scheduled Meds:  Current Facility-Administered Medications   Medication Dose Route Frequency Provider Last Rate    acetaminophen  650 mg Oral Q6H PRN Henry Torres MD      acetaminophen  975 mg Oral [facility] Henry Torres MD      calcium carbonate  1,000 mg Oral Daily PRN Henry Torres MD      enoxaparin  40 mg Subcutaneous Daily Henry Torres MD      [START ON 2/11/2022] ferrous sulfate  325 mg Oral Every Other Day Jose De Los Santos DO      hydrALAZINE  10 mg Oral Q8H [address] Henry Torres MD      levothyroxine  25 mcg Oral Early Morning Henry Torres MD      LORazepam  2 mg Oral Q6H PRN Henry Torres MD      ondansetron  4 mg Intravenous Q6H PRN Henry Torres MD      pantoprazole  40 mg Oral BID Henry Torres MD      PARoxetine  60 mg Oral Daily Henry Torres MD      polyethylene glycol  17 g Oral Daily Henry Torres MD      triamcinolone   Topical BID PRN Henry Torres MD      ziprasidone  60 mg Oral After Dinner Samantha Sánchez MD       Continuous Infusions:   PRN Meds:   acetaminophen    calcium carbonate    LORazepam    ondansetron    triamcinolone

## 2022-02-10 NOTE — OCCUPATIONAL THERAPY NOTE
Occupational Therapy Evaluation     Patient Name: Christopher Rabago  [id number] Date: 2/10/2022  Problem List  Principal Problem:    Self neglect  Active Problems:    Acquired hypothyroidism    Schizoaffective disorder ([address] )    Essential hypertension    Hypokalemia    Hyponatremia    Past Medical History  Past Medical History:   Diagnosis Date    Anxiety     Arthritis     Back pain at L4-L5 level     Schreiber esophagus     Bulimia     Colon polyp     Disease of thyroid gland     hypothyroid    Ear problems     GERD (gastroesophageal reflux disease)     History of transfusion     Hyperlipidemia     Hypothyroidism     Leukopenia     Nasal congestion     NMS (neuroleptic malignant syndrome) 01/03/2020    Pneumonia     Rheumatoid arthritis involving right hip (HCC)     Sciatica     Seizure ([address] )     due to medication mix up 8/2018 only one historically    Seizures ([facility] )     Synovial cyst of lumbar spine     Vertigo     Weight gain      Past Surgical History  Past Surgical History:   Procedure Laterality Date    BONE MARROW BIOPSY      BREAST SURGERY      enlargement with implants    CLOSED REDUCTION DISTAL FEMUR FRACTURE      COLONOSCOPY      COSMETIC SURGERY      DENTAL SURGERY      HIP ARTHROPLASTY Right 1/2/2020    Procedure: REVISION TOTAL HIP ARTHROPLASTY WITH REPAIR OF PARIPROSTHETIC FRACTURE - POSTERIOR APPROACH;  Surgeon: Mira Stevens MD;  [facility];  Service: Orthopedics    ME ESOPHAGOGASTRODUODENOSCOPY TRANSORAL DIAGNOSTIC N/A 5/11/2021    Procedure: ESOPHAGOGASTRODUODENOSCOPY (EGD); Surgeon:  Alma Pineda MD;  [facility];  Service: General    ME ESOPHAGUS SURG PROCEDURE UNLISTED N/A 5/11/2021    Procedure: FUNDOPLICATION TRANSORAL INCISIONLESS;  Surgeon: Nohemi Taylor MD;  [facility];  Service: Gastroenterology    ME LAP, REPAIR PARAESOPHAGEAL HERNIA, INCL FUNDOPLASTY W/ MESH N/A 5/11/2021    Procedure: REPAIR HERNIA PARAESOPHAGEAL LAPAROSCOPIC;  Surgeon: Nvea Shah MD;  [facility];  Service: General    GA TOTAL HIP ARTHROPLASTY Right 12/11/2019    Procedure: ARTHROPLASTY HIP TOTAL ANTERIOR;  Surgeon: Santos Guzman MD;  [facility];  Service: Orthopedics    RHINOPLASTY      SINUS SURGERY      TRANSORAL INCISIONLESS FUNDOPLICATION (TIF)  57/78/8647           02/10/22 0929   OT Last Visit   OT Visit Date 02/10/22   Note Type   Note type Evaluation   Restrictions/Precautions   Weight Bearing Precautions Per Order No   Other Precautions Fall Risk   Pain Assessment   Pain Assessment Tool 0-10   Pain Score No Pain   Home Living   Type of [address] [place] [address] One level; Laundry in basement  (2STE)   Bathroom Shower/Tub Tub/shower unit   Bathroom Toilet Raised   Bathroom Equipment Grab bars in shower  (has SC but doesn't use )   Home Equipment Walker;Cane  (did not use PTA )   Prior Function   Level of [place] Independent with ADLs and functional mobility   Lives With   (Mother)   Receives Help From [facility]   IAD Independent   Falls in the last 6 months 1 to 4  (1 per pt report)   Vocational Retired   Comments Pt assists mother with ADLs    Lifestyle   Autonomy PTA, pt reports being I with ADLs/IADLs, fnxl mobility, (+) but has not been for ~ 2 weeks   Reciprocal Relationships Mother, supportive neighbor    Service to Others Retired - was a  at [employer], watch TV, hug her cat    Psychosocial   Psychosocial (WDL) WDL   Subjective   Subjective "I woke up feeling much clearer"   ADL   Where Assessed Edge of bed   Eating Assistance 7  Independent   Grooming Assistance [address]  [address]  5  Supervision/Setup   Bed Mobility   Supine to Sit 6  Modified independent   Additional items HOB elevated   Sit to Supine Unable to assess   Transfers   Sit to Stand 7  Independent   Stand to Sit 7  Independent   Additional Comments Transfers w/o AD    Functional Mobility   Functional Mobility 5  Supervision   Balance   Static Sitting Good   Dynamic Sitting Fair +   Static Standing Fair +   Dynamic Standing Fair   Ambulatory Fair   Activity Tolerance   Activity Tolerance Patient tolerated treatment well   Medical Staff Made Aware PT Neetu Bettencourt   Nurse Made Aware RN clearance for session    RUE Assessment   RUE Assessment WFL   LUE Assessment   LUE Assessment WFL   Hand Function   Gross Motor Coordination Functional   Fine Motor Coordination Functional   Vision - Complex Assessment   Ocular Range of Motion WFL   Head Position WDL   Tracking Able to track stimulus in all quads without difficulty   Cognition   Overall Cognitive Status WFL   Arousal/Participation Responsive; Alert; Cooperative   Attention Within functional limits   Orientation Level Oriented X4   Memory Within functional limits   Following Commands Follows all commands and directions without difficulty   Comments Pt pleasant and cooperative t/o session    Assessment   Assessment Pt is a 64 y o  female [facility] w/ Self neglect  has a past medical history of schizoaffective disorder, Anxiety, Arthritis, Back pain at L4-L5 level, Schreiber esophagus, Bulimia, Colon polyp, Disease of thyroid gland, Ear problems, GERD, Hyperlipidemia, Hypothyroidism, Leukopenia, Nasal congestion, NMS (neuroleptic malignant syndrome), Pneumonia, Rheumatoid arthritis involving right hip, Sciatica, Seizure, Synovial cyst of lumbar spine, Vertigo, and Weight gain  Pt with active OT orders and up and OOB as tolerated orders  Pt seen as a co-evaluation with PT due to the patient's co-morbidities, clinically unstable presentation/clinical complexity, and present impairments   As per pt report, pta, resides with her mother in a 1STH, 2STE  Pt was I w/  ADLS and IADLS, (+) drove (has not in the past 2 weeks)  Upon evaluation, pt currently requires MI/I for transfers and S for mobility  Pt currently requires I eating, I grooming, I UB ADLs, S LB ADLs, and S toileting  Increased time to complete RLE ADLs  From OT standpoint, recommendation would be home with increased social support  No further acute OT needs  D/C OT  Please re-consult if needed  Thank you  Pt was left after session with all current needs met  The patient's raw score on the AM-PAC Daily Activity inpatient short form is 21, standardized score is 44 27, greater than 39 4  Patients at this level are likely to benefit from discharge to home  Please refer to the recommendation of the Occupational Therapist for safe discharge planning     Goals   Patient Goals to find out how her mother is doing    Plan   OT Frequency Eval only   Recommendation   OT Discharge Recommendation No rehabilitation needs  (home with increased social support )   OT - OK to Discharge Yes  (when medically stable )   AM-PAC Daily Activity Inpatient   Lower Body Dressing 3   Bathing 3   Toileting 3   Upper Body Dressing 4   Grooming 4   Eating 4   Daily Activity Raw Score 21   Daily Activity Standardized Score (Calc for Raw Score >=11) 44 27   AM-PAC Applied Cognition Inpatient   Following a Speech/Presentation 4   Understanding Ordinary Conversation 4   Taking Medications 4   Remembering Where Things Are Placed or Put Away 4   Remembering List of 4-5 Errands 4   Taking Care of Complicated Tasks 3   Applied Cognition Raw Score 23   Applied Cognition Standardized Score 53 08   Brief Interview for Mental Status (BIMS)   Repetition of Three Words (First Attempt) 3   Temporial Orientation: Year Correct   Temporal Orientation: Month Accurate within 5 days   Temporal Orientation: Day Correct   Recall: "Sock" Yes, no cue required   Recall: "Blue" Yes, no cue required   Recall: "Bed" Yes, no cue required   BIMS Summary Score 15         Maggy Supriya, MS, OTR/L

## 2022-02-10 NOTE — UTILIZATION REVIEW
Initial Clinical Review    Admission: Date/Time/Statement:   Admission Orders (From admission, onward)     Ordered        02/09/22 1259  Place in Observation  Once            02/09/22 1301  Place in Observation  Once                      Orders Placed This Encounter   Procedures    Place in Observation     Standing Status:   Standing     Number of Occurrences:   1     Order Specific Question:   Level of Care     Answer:   Med Surg [16]    Place in Observation     Standing Status:   Standing     Number of Occurrences:   1     Order Specific Question:   Level of Care     Answer:   Med Surg [16]     ED Arrival Information     Expected Arrival Acuity    - 2/9/2022 07:31 Urgent         Means of arrival Escorted by Service Admission type    Ambulance [facility] Hospitalist Urgent         Arrival complaint    anxiety        Chief Complaint   Patient presents with    Medication Problem     Pt reports she has not been taking her psychiatric medications for the past few days  Initial Presentation: 64 y o  female with a PMHx of schizoaffective d/o, HTN, anxiety, GERD, hypothyroidism, anemia, bulimia and chronic hyponatremia presents to ED by EMS admitted under observation with self neglect & hyponatremia  She says she is "confused" at home as they have no heat  Her mother apparently did not want to call for the heat to be fixed  Unsure the reason, possibly financial  She did not take her psych medications, not eating, did not feed the cat, and mother was neglected as well  Pt also admits to loose stool this AM  In ED labs with Na 131, K 3 2, CL 98, Cr 0 58, Hgb 9 7, Hct 34 2  Plan for gentle IVF's, recheck BMP in AM with goal Na 135-145  Raise of 10 meq over 24 hours only  Replete K and monitor  /94 in ED start hydralazine 10 mg tid, monitor hold for sbp<140  Continue previous prescribed po psych meds    Psych consulted       Psych consult 2/9 -- Diagnosis  Schizoaffective disorder depressed type  Plan  Continue medical management  Restart Geodon 60 mg p o  With dinner  Restart Paxil 60 mg p o   Daily  At this moment patient does not have any criteria for inpatient psych admission      Date: 2/10    Day 2:       ED Triage Vitals [02/09/22 0737]   Temperature Pulse Respirations Blood Pressure SpO2   98 3 °F (36 8 °C) 80 18 (!) 173/94 98 %      Temp Source Heart Rate Source Patient Position - Orthostatic VS BP Location FiO2 (%)   Oral Monitor Lying Right arm --      Pain Score       No Pain          Wt Readings from Last 1 Encounters:   02/10/22 76 4 kg (168 lb 6 9 oz)     Additional Vital Signs:   Date/Time Temp Pulse Resp BP MAP (mmHg) SpO2 O2 Device Patient Position - Orthostatic VS   02/10/22 07:49:14 98 4 °F (36 9 °C) 98 -- 109/74 86 96 % -- --   02/10/22 07:48:56 98 4 °F (36 9 °C) 97 -- 109/74 86 97 % -- --   02/10/22 06:11:14 -- 104 -- 158/95 116 97 % -- --   02/09/22 1930 -- -- -- -- -- -- None (Room air) --   02/09/22 19:25:50 97 8 °F (36 6 °C) 83 20 137/82 100 97 % -- Lying   02/09/22 1629 -- 83 18 148/72 -- 99 % None (Room air) --   02/09/22 1530 -- 86 16 157/88 -- 99 % None (Room air) Sitting   02/09/22 1029 -- 92 20 161/74 -- 99 % None (Room air) Lying   02/09/22 0737 98 3 °F (36 8 °C) 80 18 173/94 Abnormal  -- 98 % None (Room air) Lying       Pertinent Labs/Diagnostic Test Results:   EKG 2/9 -- NSR    Results from last 7 days   Lab Units 02/03/22  1925   SARS-COV-2  Negative     Results from last 7 days   Lab Units 02/10/22  0659 02/09/22  0829 02/03/22  1840   WBC Thousand/uL 5 83 4 55 4 74   HEMOGLOBIN g/dL 9 7* 9 7* 8 2*   HEMATOCRIT % 34 8 34 2* 28 2*   PLATELETS Thousands/uL 461* 404* 325   NEUTROS ABS Thousands/µL 3 70 3 51 3 45     Results from last 7 days   Lab Units 02/10/22  0659 02/09/22  0829 02/03/22  1840   SODIUM mmol/L 137 131* 129*   POTASSIUM mmol/L 3 9 3 2* 3 3*   CHLORIDE mmol/L 106 98* 96*   CO2 mmol/L 25 26 27   ANION GAP mmol/L 6 7 6   BUN mg/dL 8 9 8   CREATININE mg/dL 0  68 0 58* 0 63   EGFR ml/min/1 73sq m 94 99 97   CALCIUM mg/dL 9 2 9 4 11 3*   PHOSPHORUS mg/dL 2 9  --   --      Results from last 7 days   Lab Units 02/10/22  0659 02/09/22  0829 02/03/22  1840   AST U/L 17 24 27   ALT U/L 28 30 27   ALK PHOS U/L 93 98 81   TOTAL PROTEIN g/dL 7 1 7 6 6 9   ALBUMIN g/dL 3 4* 3 6 3 4*   TOTAL BILIRUBIN mg/dL 0 28 0 28 0 29     Results from last 7 days   Lab Units 02/10/22  0659 02/09/22  0829 02/03/22  1840   GLUCOSE RANDOM mg/dL 97 101 110     Results from last 7 days   Lab Units 02/09/22  0829 02/03/22  2035 02/03/22  1840   HS TNI 0HR ng/L 3  --  4   HS TNI 2HR ng/L  --  4  --    HSTNI D2 ng/L  --  0  --      Results from last 7 days   Lab Units 02/09/22  0829 02/03/22  1840   TSH 3RD GENERATON uIU/mL 1 990 2 470     Results from last 7 days   Lab Units 02/09/22  1208 02/03/22  1827   CLARITY UA  Clear Clear   COLOR UA  Yellow Yellow   SPEC GRAV UA  1 010 1 010   PH UA  7 5 7 0   GLUCOSE UA mg/dl Negative Negative   KETONES UA mg/dl Negative Negative   BLOOD UA  Negative Negative   PROTEIN UA mg/dl Negative Negative   NITRITE UA  Positive* Negative   BILIRUBIN UA  Negative Negative   UROBILINOGEN UA E U /dl 0 2 0 2   LEUKOCYTES UA  Negative Negative   WBC UA /hpf 4-10*  --    RBC UA /hpf None Seen  --    BACTERIA UA /hpf Occasional  --    EPITHELIAL CELLS WET PREP /hpf None Seen  --      Results from last 7 days   Lab Units 02/09/22  1208   AMPH/METH  Negative   BARBITURATE UR  Negative   BENZODIAZEPINE UR  Negative   COCAINE UR  Negative   METHADONE URINE  Negative   OPIATE UR  Negative   PCP UR  Negative   THC UR  Negative     Results from last 7 days   Lab Units 02/10/22  0046 02/10/22  0045   BLOOD CULTURE  Received in Microbiology Lab  Culture in Progress  Received in Microbiology Lab  Culture in Progress       ED Treatment:   Medication Administration from 02/09/2022 0731 to 02/09/2022 1846       Date/Time Order Dose Route Action     02/09/2022 0910 sodium chloride 0 9 % bolus 500 mL 500 mL Intravenous New Bag     02/09/2022 0917 potassium chloride oral solution 40 mEq 40 mEq Oral Given     02/09/2022 1241 cephalexin (KEFLEX) capsule 500 mg 500 mg Oral Given     02/09/2022 1631 hydrALAZINE (APRESOLINE) tablet 10 mg 10 mg Oral Given     Past Medical History:   Diagnosis Date    Anxiety     Arthritis     Back pain at L4-L5 level     Schreiber esophagus     Bulimia     Colon polyp     Disease of thyroid gland     hypothyroid    Ear problems     GERD (gastroesophageal reflux disease)     History of transfusion     Hyperlipidemia     Hypothyroidism     Leukopenia     Nasal congestion     NMS (neuroleptic malignant syndrome) 01/03/2020    Pneumonia     Rheumatoid arthritis involving right hip (HCC)     Sciatica     Se[facility] ([facility] [address] [address] )     due to medication mix up 8/2018 only one historically    Seizures ([facility] [address] [facility] )     Synovial cyst of lumbar spine     Vertigo     Weight gain      Present on Admission:   Essential hypertension   Hypokalemia   Hyponatremia   Schizoaffective disorder (HCC)   Self neglect   Acquired hypothyroidism      Admitting Diagnosis: Hypokalemia [E87 6]  Anxiety [F41 9]  Hyponatremia [E87 1]  UTI (urinary tract infection) [N39 0]  Elevated blood pressure reading [R03 0]  Anemia [D64 9]  Nausea and vomiting [R11 2]  Scab [R23 4]  Acute encephalopathy [G93 40]  Age/Sex: 64 y o  female  Admission Orders:  Scheduled Medications:  acetaminophen, 975 mg, Oral, Q8H [address]  enoxaparin, 40 mg, Subcutaneous, Daily  [START ON 2/11/2022] ferrous sulfate, 325 mg, Oral, Every Other Day  hydrALAZINE, 10 mg, Oral, Q8H [address]  levothyroxine, 25 mcg, Oral, Early Morning  pantoprazole, 40 mg, Oral, BID  PARoxetine, 60 mg, Oral, Daily  polyethylene glycol, 17 g, Oral, Daily  ziprasidone, 60 mg, Oral, After Dinner    PRN Meds:  acetaminophen, 650 mg, Oral, Q6H PRN  calcium carbonate, 1,000 mg, Oral, Daily PRN 2/9 x1  LORazepam, 2 mg, Oral, Q6H PRN 2/9 x1  ondansetron, 4 mg, Intravenous, Q6H PRN  triamcinolone, , Topical, BID PRN        IP CONSULT TO ED CRISIS WORKER  IP CONSULT TO PSYCHIATRY  IP CONSULT TO CASE MANAGEMENT    Network Utilization Review Department  ATTENTION: Please call with any questions or concerns to [phone] and carefully listen to the prompts so that you are directed to the right person  All voicemails are confidential   Mario Becki all requests for admission clinical reviews, approved or denied determinations and any other requests to dedicated fax number below belonging to the campus where the patient is receiving treatment   List of dedicated fax numbers for the Facilities:  [address] DENIALS (Administrative/Medical Necessity) [phone]   [address] (Maternity/NICU/Pediatrics) [phone]   [address]  [address] [address] [place] [address] [address] [facility] [place] [place] [address] [phone]

## 2022-02-10 NOTE — CASE MANAGEMENT
Case Management Assessment & Discharge Planning Note    Patient name Ping Liu  Location [facility] 933/[facility] 933-01 MRN [id number]  : 1960 Date 2/10/2022       Current Admission Date: 2022  Current Admission Diagnosis:Self neglect   Patient Active Problem List    Diagnosis Date Noted    Self neglect 2022    Pruritus 2022    Upper GI bleed 01/10/2022    Nausea and vomiting 10/22/2021    S/P tooth extraction 10/22/2021    Hyperlipidemia 2021    Word finding difficulty 2021    Hiatal hernia with gastroesophageal reflux disease and esophagitis 2021    Polyp of colon 2021    Melena 2020    Cellulitis of left upper extremity 2020    Erosive esophagitis 2020    Rash 2020    Symptomatic anemia 2020    Multiple excoriations 2020    Acute non-recurrent maxillary sinusitis 2020    Fever 2020    Medicare annual wellness visit, subsequent 2020    Dizziness 2020    Fall at home 2020    Gastroesophageal reflux disease with esophagitis without hemorrhage 2020    Hyponatremia 2020    Problem related to living arrangement 2020    Hypokalemia     NMS (neuroleptic malignant syndrome) 2020    Iron deficiency anemia due to chronic blood loss 2019    Chronic bilateral low back pain without sciatica 2019    Right hip pain 07/15/2019    Essential hypertension 2019    Elevated BP without diagnosis of hypertension 2019    Dermal hypersensitivity reaction 2018    ABIMAEL (generalized anxiety disorder) 2018    [facility]izoaffective disorder ([facility] ) 2018    Serotonin syndrome 2018    Other fatigue 2018    Spinal stenosis of lumbar region with neurogenic claudication 06/15/2018    Lumbar spondylosis 06/15/2018    T12 compression fracture (HCC) 06/15/2018    Synovial cyst of lumbar facet joint 06/15/2018    Localized osteoporosis with current pathological fracture with routine healing 05/25/2018    Lumbar radiculopathy 03/19/2018    DDD (degenerative disc disease), lumbar 03/19/2018    Spondylolisthesis at L4-L5 level 03/19/2018    Anxiety 10/31/2016    Acquired hypothyroidism 10/31/2016    Constipation 04/10/2014    Bulimia nervosa in remission 03/19/2014      LOS (days): 0  Geometric Mean LOS (GMLOS) (days):   Days to GMLOS:     OBJECTIVE:              Current admission status: Observation       Preferred Pharmacy:   [address] [address]  Phone: [phone] Fax: [phone]    Primary Care Provider: Wilner Jimenez MD    Primary Insurance: [address] [place], [place] [address]  Secondary Insurance: [address]    ASSESSMENT:  [address] [facility] Representative - Mother   Primary Phone: [phone] (Mobile)  Home Phone: [phone]               Patient Information  Admitted from[de-identified] Home  Mental Status: Alert  During Assessment patient was accompanied by: Not accompanied during assessment  Assessment information provided by[de-identified] Patient  Primary Caregiver: Self  Support Systems: [address] [facility] of Residence: [address] [facility] do you live in?: [facility] entry access options   Select all that apply : Stairs  Number of steps to enter home : 1  Type of Current Residence: [facility]  In the last 12 months, was there a time when you were not able to pay the mortgage or rent on time?: No  In the last 12 months, how many places have you lived?: 1  In the last 12 months, was there a time when you did not have a steady place to sleep or slept in a shelter (including now)?: No  Homeless/housing insecurity resource given?: N/A  Living Arrangements: Lives w/ Parent(s)    Activities of Daily Living Prior to Admission  Functional Status: Independent  Completes ADLs independently?: Yes  Ambulates independently?: Yes  Does patient use assisted devices?: No  Does patient currently own DME?: Yes  What DME does the patient currently own?: Straight Cane  Does patient have a history of Outpatient Therapy (PT/OT)?: No  Does the patient have a history of Short-Term Rehab?: Yes (Unsure of name)  Does patient have a history of HHC?: Yes ([facility])  Does patient currently have [facility]?: No    Patient Information Continued  Income Source: SSI/SSD  Does patient have prescription coverage?: Yes  Within the past 12 months, you worried that your food would run out before you got the money to buy more : Never true  Within the past 12 months, the food you bought just didnt last and you didnt have money to get more : Never true (Per pt they had food but was not able to prepare it due to not feeling well)  Food insecurity resource given?: N/A  Does patient receive dialysis treatments?: No  Does patient have a history of substance abuse?: No  Does patient have a history of Mental Health Diagnosis?: Yes (Schizoaffective disorder, Anxiety)  Has patient received inpatient treatment related to mental health in the last 2 years?: No    Means of Transportation  Means of Transport to Appts[de-identified] Drives Self  In the past 12 months, has lack of transportation kept you from medical appointments or from getting medications?: No  In the past 12 months, has lack of transportation kept you from meetings, work, or from getting things needed for daily living?: No  Was application for public transport provided?: N/A        DISCHARGE DETAILS:    Discharge planning discussed with[de-identified] pt  Freedom of Choice: Yes  Comments - Freedom of Choice: pending recs            ; Patient/caregiver received discharge checklist   Content reviewed  Patient/caregiver encouraged to participate in discharge plan of care prior to discharge home    CM reviewed d/c planning process including the following: identifying help at home, patient preference for d/c planning needs, Discharge Lounge, [facility] Meds to Bed program, availability of treatment team to discuss questions or concerns patient and/or family may have regarding understanding medications and recognizing signs and symptoms once discharged  CM also encouraged patient to follow up with all recommended appointments after discharge  Patient advised of importance for patient and family to participate in managing patients medical well being  Additional Comments: CM met with the pt to discuss what brought her to the hospital  per pt she felt that she must have had a stroke because in the last few days she felt that she was in a fog and was unable to take care of herself and her mother  Per pt she feels much better now and would like to go home and take care of her mother and cat  CM inquired about the heating in the home and per pt her boiler needs to be repaired but that would cost $1000  CM attempted to call [facility] as per previous documentation pt may have a [facility]  already following but CM was unable to get throught to the office  CM sent TT to pt's OP CM  CM will follow

## 2022-02-10 NOTE — PROGRESS NOTES
Progress Note - Behavioral Health   Hussein Leone 64 y o  female MRN: [id number]  [facility] Encounter: [id number]    Patient was seen this morning for continuation of care  She is doing much better, mood is improved, patient is telling me her  appetite is normal, she slept pretty well  She was excited about working with PT/OT, she asked me for word-finding puzzles which I provided to her  No SI/HI or hallucinations  Behavior over the last 24 hours:  improved  Sleep: normal  Appetite: normal  Medication side effects: No  ROS:no complaints this morning, all other systems are negative    Mental Status Evaluation:  Appearance:  age appropriate and and wearing hospital gown   Behavior:  normal   Speech:  normal pitch and normal volume   Mood:  euthymic   Affect:  mood-congruent   Language: naming objects and repeating phrases   Thought Process:  goal directed   Associations: intact associations   Thought Content:  normal   Perceptual Disturbances: None   Risk Potential: Suicidal Ideations none, Homicidal Ideations none and Potential for Aggression No   Sensorium:  person, place, time/date, situation, day of week and month of year   Memory:  recent and remote memory grossly intact   Cognition:  recent and remote memory grossly intact   Consciousness:  alert and awake    Attention: attention span appeared shorter than expected for age   Intellect: within normal limits   Fund of Knowledge: awareness of current events: fair, past history: fair and vocabulary: fair   Insight:  fair   Judgment: fair   Muscle Strength and Tone: within normal limits   Gait/Station: unable to asssess, patient sitting in her recliner   Motor Activity: no abnormal movements         Assessment/Plan  Hussein Leone is a 64 y o  female with a h/o schizoaffective disorder who presented to ED complaining of worsening confusion, not able to taking care of herself and not taking her meds for at least a week   Mood , appetite much improved today, denies sleep disturbances  Diagnosis:  Schizoaffective disorder depressed type    Recommended Treatment:   Continue medical management  PT/OT as able  Continue Geodon 60 mg after dinner and Paxil 60 mg in am          Medications:   all current active meds have been reviewed, continue current psychiatric medications and current meds:   Current Facility-Administered Medications   Medication Dose Route Frequency    acetaminophen (TYLENOL) tablet 650 mg  650 mg Oral Q6H PRN    acetaminophen (TYLENOL) tablet 975 mg  975 mg Oral Q8H [facility]    calcium carbonate (TUMS) chewable tablet 1,000 mg  1,000 mg Oral Daily PRN    enoxaparin (LOVENOX) subcutaneous injection 40 mg  40 mg Subcutaneous Daily    [START ON 2/11/2022] ferrous sulfate tablet 325 mg  325 mg Oral Every Other Day    hydrALAZINE (APRESOLINE) tablet 10 mg  10 mg Oral Q8H [facility]    levothyroxine tablet 25 mcg  25 mcg Oral Early Morning    LORazepam (ATIVAN) tablet 2 mg  2 mg Oral Q6H PRN    ondansetron (ZOFRAN) injection 4 mg  4 mg Intravenous Q6H PRN    pantoprazole (PROTONIX) EC tablet 40 mg  40 mg Oral BID    PARoxetine (PAXIL) tablet 60 mg  60 mg Oral Daily    polyethylene glycol (MIRALAX) packet 17 g  17 g Oral Daily    triamcinolone (KENALOG) 0 1 % cream   Topical BID PRN    ziprasidone (GEODON) capsule 60 mg  60 mg Oral After Dinner         Risks, benefits and possible side effects of Medications:     Risks, benefits, and possible side effects of medications explained to patient and patient verbalizes understanding  Labs: I have personally reviewed all pertinent laboratory results  I have personally reviewed all pertinent laboratory/tests results    Most Recent Labs:   Lab Results   Component Value Date    WBC 5 83 02/10/2022    RBC 4 98 02/10/2022    HGB 9 7 (L) 02/10/2022    HCT 34 8 02/10/2022     (H) 02/10/2022    RDW 20 4 (H) 02/10/2022    NEUTROABS 3 70 02/10/2022    SODIUM 137 02/10/2022    K 3 9 02/10/2022     02/10/2022    CO2 25 02/10/2022    BUN 8 02/10/2022    CREATININE 0 68 02/10/2022    GLUC 97 02/10/2022    GLUF 91 05/13/2021    CALCIUM 9 2 02/10/2022    AST 17 02/10/2022    ALT 28 02/10/2022    ALKPHOS 93 02/10/2022    TP 7 1 02/10/2022    ALB 3 4 (L) 02/10/2022    TBILI 0 28 02/10/2022    CHOLESTEROL 181 05/13/2021    HDL 69 05/13/2021    TRIG 113 05/13/2021    LDLCALC 89 05/13/2021    NONHDLC 167 08/21/2018    QPK7BBLJZLKI 1 990 02/09/2022    FREET4 0 88 01/03/2020    T3FREE 2 47 08/21/2018    HGBA1C 5 0 05/13/2021    EAG 97 05/13/2021       Counseling / Coordination of Care  Total floor / unit time spent today 30 minutes  Greater than 50% of total time was spent with the patient and / or family counseling and / or coordination of care   A description of the counseling / coordination of care: medication compliance     Go Swain MD

## 2022-02-10 NOTE — CASE MANAGEMENT
Case Management Discharge Planning Note    Patient name Perez Laws  [facility] MRN [id number]  : 1960 Date 2/10/2022       Current Admission Date: 2022  Current Admission Diagnosis:Self neglect   Patient Active Problem List    Diagnosis Date Noted    Self neglect 2022    Pruritus 2022    Upper GI bleed 01/10/2022    Nausea and vomiting 10/22/2021    S/P tooth extraction 10/22/2021    Hyperlipidemia 2021    Word finding difficulty 2021    Hiatal hernia with gastroesophageal reflux disease and esophagitis 2021    Polyp of colon 2021    Melena 2020    Cellulitis of left upper extremity 2020    Erosive esophagitis 2020    Rash 2020    Symptomatic anemia 2020    Multiple excoriations 2020    Acute non-recurrent maxillary sinusitis 2020    Fever 2020    Medicare annual wellness visit, subsequent 2020    Dizziness 2020    Fall at home 2020    Gastroesophageal reflux disease with esophagitis without hemorrhage 2020    Hyponatremia 2020    Problem related to living arrangement 2020    Hypokalemia     NMS (neuroleptic malignant syndrome) 2020    Iron deficiency anemia due to chronic blood loss 2019    Chronic bilateral low back pain without sciatica 2019    Right hip pain 07/15/2019    Essential hypertension 2019    Elevated BP without diagnosis of hypertension 2019    Dermal hypersensitivity reaction 2018    ABIMAEL (generalized anxiety disorder) 2018    Schizoaffective disorder ([address] ) 2018    Serotonin syndrome 2018    Other fatigue 2018    Spinal stenosis of lumbar region with neurogenic claudication 06/15/2018    Lumbar spondylosis 06/15/2018    T12 compression fracture ([address] ) 06/15/2018    Synovial cyst of lumbar facet joint 06/15/2018    Localized osteoporosis with current pathological fracture with routine healing 05/25/2018    Lumbar radiculopathy 03/19/2018    DDD (degenerative disc disease), lumbar 03/19/2018    Spondylolisthesis at L4-L5 level 03/19/2018    Anxiety 10/31/2016    Acquired hypothyroidism 10/31/2016    Constipation 04/10/2014    Bulimia nervosa in remission 03/19/2014      LOS (days): 0  Geometric Mean LOS (GMLOS) (days):   Days to GMLOS:     OBJECTIVE:            Current admission status: Observation   Preferred Pharmacy:   [facility] [address] [address]  Phone: [phone] Fax: [phone]    Primary Care Provider: Lashay Watkins MD    Primary Insurance: [address] MEDICARE [facility] REP  Secondary Insurance: Camille Rivero    DISCHARGE DETAILS:    Additional Comments: During assessemnt Cm had notified pt that she will be amaking a report with [facility]  Pt was in agreement  [facility]  CM made an APS report for pt due to concerns about safety at home

## 2022-02-10 NOTE — PHYSICAL THERAPY NOTE
Physical Therapy Evaluation     Patient's Name: Caty Pitts    Admitting Diagnosis  Hypokalemia [E87 6]  Anxiety [F41 9]  Hyponatremia [E87 1]  UTI (urinary tract infection) [N39 0]  Elevated blood pressure reading [R03 0]  Anemia [D64 9]  Nausea and vomiting [R11 2]  Scab [R23 4]  Acute encephalopathy [G93 40]    Problem List  Patient Active Problem List   Diagnosis    Lumbar radiculopathy    DDD (degenerative disc disease), lumbar    Spondylolisthesis at L4-L5 level    Localized osteoporosis with current pathological fracture with routine healing    Spinal stenosis of lumbar region with neurogenic claudication    Lumbar spondylosis    T12 compression fracture (HCC)    Synovial cyst of lumbar facet joint    Anxiety    Bulimia nervosa in remission    Constipation    Acquired hypothyroidism    Other fatigue    Serotonin syndrome    Schizoaffective disorder (HCC)    ABIMAEL (generalized anxiety disorder)    Dermal hypersensitivity reaction    Elevated BP without diagnosis of hypertension    Essential hypertension    Right hip pain    Chronic bilateral low back pain without sciatica    Iron deficiency anemia due to chronic blood loss    NMS (neuroleptic malignant syndrome)    Hypokalemia    Fall at home    Gastroesophageal reflux disease with esophagitis without hemorrhage    Hyponatremia    Problem related to living arrangement    Dizziness    Medicare annual wellness visit, subsequent    Fever    Acute non-recurrent maxillary sinusitis    Symptomatic anemia    Multiple excoriations    Rash    Cellulitis of left upper extremity    Erosive esophagitis    Melena    Hiatal hernia with gastroesophageal reflux disease and esophagitis    Polyp of colon    Word finding difficulty    Hyperlipidemia    Nausea and vomiting    S/P tooth extraction    Upper GI bleed    Pruritus    Self neglect       Past Medical History  Past Medical History:   Diagnosis Date    Anxiety  Arthritis     Back pain at L4-L5 level     Schreiber esophagus     Bulimia     Colon polyp     Disease of thyroid gland     hypothyroid    Ear problems     GERD (gastroesophageal reflux disease)     History of transfusion     Hyperlipidemia     Hypothyroidism     Leukopenia     Nasal congestion     NMS (neuroleptic malignant syndrome) 01/03/2020    Pneumonia     Rheumatoid arthritis involving right hip (HCC)     Sciatica     Seizure [facility] )     due to medication mix up 8/2018 only one historically    Seizures ([facility] )     Synovial cyst of lumbar spine     Vertigo     Weight gain        Past Surgical History  Past Surgical History:   Procedure Laterality Date    BONE MARROW BIOPSY      BREAST SURGERY      enlargement with implants    CLOSED REDUCTION DISTAL FEMUR FRACTURE      COLONOSCOPY      COSMETIC SURGERY      DENTAL SURGERY      HIP ARTHROPLASTY Right 1/2/2020    Procedure: REVISION TOTAL HIP ARTHROPLASTY WITH REPAIR OF PARIPROSTHETIC FRACTURE - POSTERIOR APPROACH;  Surgeon: Mack Bang MD;  [facility];  Service: Orthopedics    MO ESOPHAGOGASTRODUODENOSCOPY TRANSORAL DIAGNOSTIC N/A 5/11/2021    Procedure: ESOPHAGOGASTRODUODENOSCOPY (EGD); Surgeon: Jess Valle MD;  [facility];  Service: General    MO ESOPHAGUS SURG PROCEDURE UNLISTED N/A 5/11/2021    Procedure: FUNDOPLICATION TRANSORAL INCISIONLESS;  Surgeon: Clyde Canales MD;  [facility];  Service: Gastroenterology    MO LAP, REPAIR PARAESOPHAGEAL HERNIA, INCL FUNDOPLASTY W/ MESH N/A 5/11/2021    Procedure: REPAIR HERNIA PARAESOPHAGEAL  LAPAROSCOPIC;  Surgeon:  Jess Valle MD;  [facility];  Service: General    MO TOTAL HIP ARTHROPLASTY Right 12/11/2019    Procedure: ARTHROPLASTY HIP TOTAL ANTERIOR;  Surgeon: Mack Bang MD;  [facility];  Service: Orthopedics    RHINOPLASTY      SINUS SURGERY      TRANSORAL INCISIONLESS FUNDOPLICATION (TIF)  15/02/4729 02/10/22 0928   PT Last Visit   PT Visit Date 02/10/22   Note Type   Note type Evaluation   Pain Assessment   Pain Assessment Tool 0-10   Pain Score No Pain   Restrictions/Precautions   Weight Bearing Precautions Per Order No   Other Precautions Fall Risk   Home Living   Type of [address] One level; Laundry in basement;Performs ADLs on one level; Able to live on main level with bedroom/bathroom  (2 FAMILIA )   Bathroom Shower/Tub Tub/shower unit   [employer] Raised   Bathroom Equipment Grab bars in shower; Shower chair   Home Equipment Walker;Cane  (did not use PTA )   Prior Function   Level of [place] Independent with ADLs and functional mobility   Lives With Family  (79 y/o mother )   [address] From [address] in the last 6 months 1 to 4  (1 per pt report )   Vocational Retired   General   Family/Caregiver Present No   Cognition   Overall Cognitive Status WFL   Arousal/Participation Cooperative   Orientation Level Oriented X4   Memory Within functional limits   Following Commands Follows all commands and directions without difficulty   Comments Pt very pleasant and cooperative to participate in therapy session    RLE Assessment   RLE Assessment WFL   LLE Assessment   LLE Assessment WFL   Bed Mobility   Supine to Sit 6  Modified independent   Additional items HOB elevated   Sit to Supine Unable to assess   Additional Comments pt supine in bed upon arrival  Pt sitting OOB in bedside chair with all needs within reach    Transfers   Sit to Stand 7  Independent   Stand to Sit 7  Independent   Additional Comments transfers without AD    Ambulation/Elevation   Gait pattern Short stride; Excessively slow   Gait Assistance 5  Supervision   Assistive Device None   Distance 2 x 100ft; can be impulsive at times with ambulation- occasional cues for pacing   No LOB noted, steady on feet throughout    Stair Management Assistance 5  Supervision   Stair Management Technique One rail R;Alternating pattern; Foreward   Number of Stairs 7   Balance   Static Sitting Good   Dynamic Sitting Good   Static Standing Fair   Dynamic Standing Fair   Ambulatory Fair   Activity Tolerance   Activity Tolerance Patient tolerated treatment well   Medical Staff Made Aware OT; co-eval performed this date 2* increased medical complexity and multiple co-morbidities    Nurse Made Aware RN cleared pt to participate in therapy session    Assessment   Prognosis Good   Assessment Pt seen for mod complexity PT evaluation  Pt with active PT eval/treat and up and OOB as tolerated orders  Pt is a 64 y o  female who was [facility] on 2/[facility]/2022 with self neglect and c/o confusion  Pt's active problems include: hypothyroidism, schizoaffective disorder, HTN, hypokalemia, hyponatremia  Pt  has a past medical history of Anxiety, Arthritis, Back pain at L4-L5 level, Schreiber esophagus, Bulimia, Colon polyp, Disease of thyroid gland, Ear problems, GERD (gastroesophageal reflux disease), History of transfusion, Hyperlipidemia, Hypothyroidism, Leukopenia, Nasal congestion, NMS (neuroleptic malignant syndrome) (01/03/2020), Pneumonia, Rheumatoid arthritis involving right hip ([address] ), Sciatica, Seizure ([address] ), Seizures ([address] ), Synovial cyst of lumbar spine, Vertigo, and Weight gain  Pt resides with mother in [facility] with 2 FAMILIA and was independent without AD prior to hospital admission  Currently upon evaluation pt performing bed mobility tasks at mod I level, funational transfers at I level and ambulation at S level  Pt negotiated 7 steps using HR at S level  Pt was left sitting OOB in bedside chair at the end of PT session with all needs in reach  Pt with no questions or concerns regarding d/c home; pt with no further acute inpatient PT needs at this time- please re-consult if needed  PT to d/c pt and recommends home  The patient's AM-PAC Basic Mobility Inpatient Short Form Raw Score is 22   A Raw score of greater than 16 suggests the patient may benefit from discharge to home  Please also refer to the recommendation of the Physical Therapist for safe discharge planning  Goals   Patient Goals to check on her mother    Plan   Treatment/Interventions Functional transfer training;LE strengthening/ROM; Elevations; Endurance training;Patient/family training;Bed mobility;Gait training;Spoke to nursing;Spoke to case management;OT   PT Frequency   (d/c PT )   Recommendation   PT Discharge Recommendation No rehabilitation needs   AM-PAC Basic Mobility Inpatient   Turning in Bed Without Bedrails 4   Lying on Back to Sitting on Edge of Flat Bed 4   Moving Bed to Chair 4   Standing Up From Chair 4   Walk in Room 3   Climb 3-5 Stairs 3   Basic Mobility Inpatient Raw Score 22   Basic Mobility Standardized Score 47 4   Highest Level Of Mobility   JH-HLM Goal 7: Walk 25 feet or more   JH-HLM Highest Level of Mobility 8: Walk 250 feet ot more   JH-HLM Goal Achieved Yes   End of Consult   Patient Position at End of Consult Bedside chair; All needs within reach         Ana Wu, PT

## 2022-02-11 VITALS
BODY MASS INDEX: 28.76 KG/M2 | DIASTOLIC BLOOD PRESSURE: 75 MMHG | WEIGHT: 168.43 LBS | SYSTOLIC BLOOD PRESSURE: 152 MMHG | OXYGEN SATURATION: 99 % | RESPIRATION RATE: 18 BRPM | TEMPERATURE: 98.3 F | HEIGHT: 64 IN | HEART RATE: 109 BPM

## 2022-02-11 LAB
ANION GAP SERPL CALCULATED.3IONS-SCNC: 4 MMOL/L (ref 4–13)
BUN SERPL-MCNC: 10 MG/DL (ref 5–25)
CALCIUM SERPL-MCNC: 9.1 MG/DL (ref 8.3–10.1)
CHLORIDE SERPL-SCNC: 107 MMOL/L (ref 100–108)
CO2 SERPL-SCNC: 28 MMOL/L (ref 21–32)
CREAT SERPL-MCNC: 0.59 MG/DL (ref 0.6–1.3)
GFR SERPL CREATININE-BSD FRML MDRD: 99 ML/MIN/1.73SQ M
GLUCOSE P FAST SERPL-MCNC: 90 MG/DL (ref 65–99)
GLUCOSE SERPL-MCNC: 90 MG/DL (ref 65–140)
POTASSIUM SERPL-SCNC: 3.7 MMOL/L (ref 3.5–5.3)
SODIUM SERPL-SCNC: 139 MMOL/L (ref 136–145)

## 2022-02-11 PROCEDURE — 80048 BASIC METABOLIC PNL TOTAL CA: CPT | Performed by: INTERNAL MEDICINE

## 2022-02-11 PROCEDURE — 99217 PR OBSERVATION CARE DISCHARGE MANAGEMENT: CPT | Performed by: INTERNAL MEDICINE

## 2022-02-11 RX ORDER — AMLODIPINE BESYLATE 5 MG/1
5 TABLET ORAL DAILY
Qty: 30 TABLET | Refills: 0 | Status: SHIPPED | OUTPATIENT
Start: 2022-02-11 | End: 2022-04-01 | Stop reason: SDUPTHER

## 2022-02-11 RX ADMIN — PAROXETINE 60 MG: 20 TABLET, FILM COATED ORAL at 09:24

## 2022-02-11 RX ADMIN — ENOXAPARIN SODIUM 40 MG: 40 INJECTION SUBCUTANEOUS at 09:24

## 2022-02-11 RX ADMIN — HYDRALAZINE HYDROCHLORIDE 10 MG: 10 TABLET, FILM COATED ORAL at 05:14

## 2022-02-11 RX ADMIN — ACETAMINOPHEN 975 MG: 325 TABLET, FILM COATED ORAL at 05:15

## 2022-02-11 RX ADMIN — FERROUS SULFATE TAB 325 MG (65 MG ELEMENTAL FE) 325 MG: 325 (65 FE) TAB at 09:25

## 2022-02-11 RX ADMIN — ACETAMINOPHEN 975 MG: 325 TABLET, FILM COATED ORAL at 13:40

## 2022-02-11 RX ADMIN — PANTOPRAZOLE SODIUM 40 MG: 40 TABLET, DELAYED RELEASE ORAL at 09:24

## 2022-02-11 RX ADMIN — HYDRALAZINE HYDROCHLORIDE 10 MG: 10 TABLET, FILM COATED ORAL at 13:40

## 2022-02-11 RX ADMIN — LEVOTHYROXINE SODIUM 25 MCG: 25 TABLET ORAL at 05:14

## 2022-02-11 RX ADMIN — CALCIUM CARBONATE (ANTACID) CHEW TAB 500 MG 1000 MG: 500 CHEW TAB at 09:39

## 2022-02-11 NOTE — CASE MANAGEMENT
Case Management Discharge Planning Note    Patient name Amrita Jim  Location [facility] 933/[facility] 483-45 MRN [id number]  : 1960 Date 2022       Current Admission Date: 2022  Current Admission Diagnosis:Self neglect   Patient Active Problem List    Diagnosis Date Noted    Self neglect 2022    Pruritus 2022    Upper GI bleed 01/10/2022    Nausea and vomiting 10/22/2021    S/P tooth extraction 10/22/2021    Hyperlipidemia 2021    Word finding difficulty 2021    Hiatal hernia with gastroesophageal reflux disease and esophagitis 2021    Polyp of colon 2021    Melena 2020    Cellulitis of left upper extremity 2020    Erosive esophagitis 2020    Rash 2020    Symptomatic anemia 2020    Multiple excoriations 2020    Acute non-recurrent maxillary sinusitis 2020    Fever 2020    Medicare annual wellness visit, subsequent 2020    Dizziness 2020    Fall at home 2020    Gastroesophageal reflux disease with esophagitis without hemorrhage 2020    Hyponatremia 2020    Problem related to living arrangement 2020    Hypokalemia     NMS (neuroleptic malignant syndrome) 2020    Iron deficiency anemia due to chronic blood loss 2019    Chronic bilateral low back pain without sciatica 2019    Right hip pain 07/15/2019    Essential hypertension 2019    Elevated BP without diagnosis of hypertension 2019    Dermal hypersensitivity reaction 2018    ABIMAEL (generalized anxiety disorder) 2018    Schizoaffective disorder ([address] ) 2018    Serotonin syndrome 2018    Other fatigue 2018    Spinal stenosis of lumbar region with neurogenic claudication 06/15/2018    Lumbar spondylosis 06/15/2018    T12 compression fracture ([address] ) 06/15/2018    Synovial cyst of lumbar facet joint 06/15/2018    Localized osteoporosis with current pathological fracture with routine healing 05/25/2018    Lumbar radiculopathy 03/19/2018    DDD (degenerative disc disease), lumbar 03/19/2018    Spondylolisthesis at L4-L5 level 03/19/2018    Anxiety 10/31/2016    Acquired hypothyroidism 10/31/2016    Constipation 04/10/2014    Bulimia nervosa in remission 03/19/2014      LOS (days): 0  Geometric Mean LOS (GMLOS) (days):   Days to GMLOS:     OBJECTIVE:            Current admission status: Observation   Preferred Pharmacy:   [facility], [address] [place] [address]  Phone: [phone] Fax: [phone]    Primary Care Provider: Cinthia Valera MD    Primary Insurance: Nidhi Sanchez MEDICARE [facility] REP  Secondary Insurance: Kristi Kilgore    DISCHARGE DETAILS:    Discharge planning discussed with[de-identified] pt  Freedom of Choice: Yes  Comments - Freedom of Choice: pt in agreement with referral for VNA for SN and to [facility]  CM contacted family/caregiver?: No- see comments (pt 's mother is in hospital)  Were Treatment Team discharge recommendations reviewed with patient/caregiver?: Yes  Did patient/caregiver verbalize understanding of patient care needs?: Yes  Were patient/caregiver advised of the risks associated with not following Treatment Team discharge recommendations?: Yes    Requested [address]         Is the patient interested in [address] at discharge?: Yes  [address] requested[de-identified] [facility] Name[de-identified] Other  [address] [place] [address] Provider[de-identified] PCP  Home Health Services Needed[de-identified] Evaluate Functional Status and Safety,Other (comment) (Medication Management)  Homebound Criteria Met[de-identified] Requires the Assistance of Another Person for Safe Ambulation or to Leave the Home  Supporting Clincal Findings[de-identified] Limited Endurance    DME Referral Provided  Referral made for DME?: No    Other Referral/Resources/Interventions Provided:  Interventions: HHC,Meals on Wheels  Referral Comments: Referrals sent as requested         Treatment Team [facility]ation[facility] [address]  Discharge Destination Plan[de-identified] Home with [place] at Discharge :  ([place] one[place])     Additional Comments: CM spoke to Serafin Salmeron ([phone]) from [place] [address] who completed pt's evaluation  Per Charla Dubin they will continue to follow up with pt  CM was notified by Charla Dubin that pt mentioned that she did not have the keys to her house  CM met with pt who confirmed CM enquired if her mother had a key and pt stated that she called her mother and she stated that she did not have the key  CM asked if a neighbor had a key or if she had a hide a key and pt stated that she did not  per pt she has [employer] and they will pay for a lock woods  CM called and was notified that pt would need to get her own  and then they would reimburse her  CM notified the pt and provided a list of lock smiths for her to call and check pricing and availability  CM later spoke to the pt who confirmed that she had a  who would be able to come out and a 1700 d/c was planed for  CM notified attending and nurse of the d/c time

## 2022-02-11 NOTE — UTILIZATION REVIEW
Continued Stay Review    Date: 2/11/2022                        Current Patient Class: inpatient  Current Level of Care: med/surg  HPI:61 y o  female initially admitted on 2/9 with self neglect  Assessment/Plan: Plan for safe discharge tomorrow, pt now obtain heat at her home and has been deemed medically stable by Psychiatry, she does not require any inpatient admission and has improved with resumption of her psychiatric medications  Continue po meds, supportive care      Vital Signs:   Date/Time Temp Pulse Resp BP MAP (mmHg) SpO2 O2 Device   02/11/22 15:09:04 98 3 °F (36 8 °C) 109 Abnormal  18 152/75 101 99 % --   02/11/22 13:36:14 -- 101 -- 137/74 95 97 % --   02/11/22 09:29:37 98 °F (36 7 °C) 106 Abnormal  18 146/75 99 97 % --   02/11/22 05:11:09 98 8 °F (37 1 °C) 97 18 136/74 95 98 % --       Pertinent Labs/Diagnostic Results:     Results from last 7 days   Lab Units 02/10/22  0659 02/09/22  0829   WBC Thousand/uL 5 83 4 55   HEMOGLOBIN g/dL 9 7* 9 7*   HEMATOCRIT % 34 8 34 2*   PLATELETS Thousands/uL 461* 404*   NEUTROS ABS Thousands/µL 3 70 3 51     Results from last 7 days   Lab Units 02/11/22  0807 02/10/22  0659 02/09/22  0829   SODIUM mmol/L 139 137 131*   POTASSIUM mmol/L 3 7 3 9 3 2*   CHLORIDE mmol/L 107 106 98*   CO2 mmol/L 28 25 26   ANION GAP mmol/L 4 6 7   BUN mg/dL 10 8 9   CREATININE mg/dL 0 59* 0 68 0 58*   EGFR ml/min/1 73sq m 99 94 99   CALCIUM mg/dL 9 1 9 2 9 4   MAGNESIUM mg/dL  --  2 1  --    PHOSPHORUS mg/dL  --  2 9  --      Results from last 7 days   Lab Units 02/10/22  0659 02/09/22  0829   AST U/L 17 24   ALT U/L 28 30   ALK PHOS U/L 93 98   TOTAL PROTEIN g/dL 7 1 7 6   ALBUMIN g/dL 3 4* 3 6   TOTAL BILIRUBIN mg/dL 0 28 0 28     Results from last 7 days   Lab Units 02/11/22  0807 02/10/22  0659 02/09/22  0829   GLUCOSE RANDOM mg/dL 90 97 101     Results from last 7 days   Lab Units 02/09/22  0829   HS TNI 0HR ng/L 3     Results from last 7 days   Lab Units 02/09/22  0829   TSH 3RD GENERATON uIU/mL 1 990     Results from last 7 days   Lab Units 02/09/22  1208   CLARITY UA  Clear   COLOR UA  Yellow   SPEC GRAV UA  1 010   PH UA  7 5   GLUCOSE UA mg/dl Negative   KETONES UA mg/dl Negative   BLOOD UA  Negative   PROTEIN UA mg/dl Negative   NITRITE UA  Positive*   BILIRUBIN UA  Negative   UROBILINOGEN UA E U /dl 0 2   LEUKOCYTES UA  Negative   WBC UA /hpf 4-10*   RBC UA /hpf None Seen   BACTERIA UA /hpf Occasional   EPITHELIAL CELLS WET PREP /hpf None Seen     Results from last 7 days   Lab Units 02/09/22  1208   AMPH/METH  Negative   BARBITURATE UR  Negative   BENZODIAZEPINE UR  Negative   COCAINE UR  Negative   METHADONE URINE  Negative   OPIATE UR  Negative   PCP UR  Negative   THC UR  Negative     Results from last 7 days   Lab Units 02/10/22  0046 02/10/22  0045   BLOOD CULTURE  No Growth at 24 hrs  No Growth at 24 hrs  Medications:   Scheduled Medications:  acetaminophen, 975 mg, Oral, Q8H YUMIKO  enoxaparin, 40 mg, Subcutaneous, Daily  ferrous sulfate, 325 mg, Oral, Every Other Day  hydrALAZINE, 10 mg, Oral, Q8H [address]  levothyroxine, 25 mcg, Oral, Early Morning  pantoprazole, 40 mg, Oral, BID  PARoxetine, 60 mg, Oral, Daily  polyethylene glycol, 17 g, Oral, Daily  ziprasidone, 60 mg, Oral, After Dinner    PRN Meds:  acetaminophen, 650 mg, Oral, Q6H PRN  calcium carbonate, 1,000 mg, Oral, Daily PRN  LORazepam, 2 mg, Oral, Q6H PRN  ondansetron, 4 mg, Intravenous, Q6H PRN  triamcinolone, , Topical, BID PRN        Discharge Plan: TBD    Network Utilization Review Department  ATTENTION: Please call with any questions or concerns to [phone] and carefully listen to the prompts so that you are directed to the right person  All voicemails are confidential   Yodit Montgomery all requests for admission clinical reviews, approved or denied determinations and any other requests to dedicated fax number below belonging to the campus where the patient is receiving treatment   List of dedicated fax numbers for the Facilities:  FACILITY NAME UR FAX NUMBER   ADMISSION DENIALS (Administrative/Medical Necessity) [phone]   [address] (Maternity/NICU/Pediatrics) 261 [facility],7Th Floor [facility] [address] [place] [address]  [phone]   [address] [id number] [place] [address] [address] [facility] [place] [address] [phone]

## 2022-02-11 NOTE — PLAN OF CARE
Problem: INFECTION - ADULT  Goal: Absence or prevention of progression during hospitalization  Description: INTERVENTIONS:  - Assess and monitor for signs and symptoms of infection  - Monitor lab/diagnostic results  - Monitor all insertion sites, i e  indwelling lines, tubes, and drains  - Monitor endotracheal if appropriate and nasal secretions for changes in amount and color  - [place] appropriate cooling/warming therapies per order  - Administer medications as ordered  - Instruct and encourage patient and family to use good hand hygiene technique  - Identify and instruct in appropriate isolation precautions for identified infection/condition  Outcome: Progressing     Problem: PAIN - ADULT  Goal: Verbalizes/displays adequate comfort level or baseline comfort level  Description: Interventions:  - Encourage patient to monitor pain and request assistance  - Assess pain using appropriate pain scale  - Administer analgesics based on type and severity of pain and evaluate response  - Implement non-pharmacological measures as appropriate and evaluate response  - Consider cultural and social influences on pain and pain management  - Notify physician/advanced practitioner if interventions unsuccessful or patient reports new pain  Outcome: Progressing     Problem: SAFETY ADULT  Goal: Patient will remain free of falls  Description: INTERVENTIONS:  - Educate patient/family on patient safety including physical limitations  - Instruct patient to call for assistance with activity   - Consult OT/PT to assist with strengthening/mobility   - Keep Call bell within reach  - Keep bed low and locked with side rails adjusted as appropriate  - Keep care items and personal belongings within reach  - Initiate and maintain comfort rounds  - Make Fall Risk Sign visible to staff  - Offer Toileting every 2 Hours, in advance of need  - Initiate/Maintain alarm  - Obtain necessary fall risk management equipment  - Apply yellow socks and bracelet for high fall risk patients  - Consider moving patient to room near nurses station  Outcome: Progressing     Problem: SAFETY ADULT  Goal: Maintain or return to baseline ADL function  Description: INTERVENTIONS:  -  Assess patient's ability to carry out ADLs; assess patient's baseline for ADL function and identify physical deficits which impact ability to perform ADLs (bathing, care of mouth/teeth, toileting, grooming, dressing, etc )  - Assess/evaluate cause of self-care deficits   - Assess range of motion  - Assess patient's mobility; develop plan if impaired  - Assess patient's need for assistive devices and provide as appropriate  - Encourage maximum independence but intervene and supervise when necessary  - Involve family in performance of ADLs  - Assess for home care needs following discharge   - Consider OT consult to assist with ADL evaluation and planning for discharge  - Provide patient education as appropriate  Outcome: Progressing     Problem: SAFETY ADULT  Goal: Maintains/Returns to pre admission functional level  Description: INTERVENTIONS:  - Perform BMAT or MOVE assessment daily    - Set and communicate daily mobility goal to care team and patient/family/caregiver  - Collaborate with rehabilitation services on mobility goals if consulted  - Perform Range of Motion   - Reposition patient every 2 hours    - Dangle patient   - Stand patient   - Ambulate patient   - Out of bed to chair   - Out of bed for meals   - Out of bed for toileting  - Record patient progress and toleration of activity level   Outcome: Progressing     Problem: DISCHARGE PLANNING  Goal: Discharge to home or other facility with appropriate resources  Description: INTERVENTIONS:  - Identify barriers to discharge w/patient and caregiver  - Arrange for needed discharge resources and transportation as appropriate  - Identify discharge learning needs (meds, wound care, etc )  - Arrange for interpretive services to assist at discharge as needed  - Refer to Case Management Department for coordinating discharge planning if the patient needs post-hospital services based on physician/advanced practitioner order or complex needs related to functional status, cognitive ability, or social support system  Outcome: Progressing

## 2022-02-12 DIAGNOSIS — F41.1 GAD (GENERALIZED ANXIETY DISORDER): Chronic | ICD-10-CM

## 2022-02-12 DIAGNOSIS — F41.9 ANXIETY: ICD-10-CM

## 2022-02-12 RX ORDER — PAROXETINE 30 MG/1
TABLET, FILM COATED ORAL
Qty: 180 TABLET | Refills: 0 | Status: SHIPPED | OUTPATIENT
Start: 2022-02-12 | End: 2022-02-14 | Stop reason: SDUPTHER

## 2022-02-12 NOTE — DISCHARGE SUMMARY
[address] [facility]  Discharge- Stephon Conn 1960, 64 y o  female MRN: [id number]  Unit/Bed#: [facility] 933-01 Encounter: [id number]  Primary Care Provider: Kiel Fair MD   Date and time admitted to hospital: 2/9/2022  7:32 AM    Hyponatremia  Assessment & Plan  Hyponatremia: patient has hyponatremia to 131  She has confusion as she is not taking her psych medications for 2-3 days and not eating, having loose stools  Possible due to hypovolemic hyponatremia, she has history  siad    -will stop fluids and monitor sodium     Hypokalemia  Assessment & Plan  Possibly related loose bm she has and not eating   Would provide and continue with regular diet  Replace potassium and recheck in the am   Check magnesium levels    Essential hypertension  Assessment & Plan  Essential hypertension   Not on medications at home  Will start hydralazine 10 mg tid, monitor hold for sbp<140    Schizoaffective disorder ([facility] [address] )  Assessment & Plan  Schizoaffective disorder  Probably depression, And hyponatremia, non compliance with medications in setting of self neglect and not having heat at home  Continue with Seroquel, Geodon      Acquired hypothyroidism  Assessment & Plan  Hypothyroidism  Continue with levothyroxine  TSH is normal limits    * Self neglect  Assessment & Plan  Self neglect: possible depression, she is in a cold house as mother did not call for Heat repair  They would need outpatient CM to assist for heating in the house  Possibly due to not taking her medications, UTI and possible depression   Patient is not able to care for herself, which she was able before     Psych evaluation   CM consult   Possible rehab/fixing home issues and VNA to check up on daughter and mother     2/10/22:  Plan for safe discharge tomorrow, patient now obtain heat at her home and has been deemed medically stable by Psychiatry, she does not require any inpatient admission and has improved with resumption of her psychiatric medications      2/11/22: patient discharged home with vna for medication management        Medical Problems             Resolved Problems  Date Reviewed: 2/11/2022    None              Discharging Physician / Practitioner: Taty Coelho DO  PCP: Oscar Gustafson MD  Admission Date:   Admission Orders (From admission, onward)     Ordered        02/09/22 1259  Place in Observation  Once            02/09/22 1301  Place in Observation  Once                      Discharge Date: 02/11/22    Consultations During Hospital Stay:  · psychiatry    Procedures Performed:   · none    Significant Findings / Test Results:   No orders to display   ·     Incidental Findings:   · As under imaging     Test Results Pending at Discharge (will require follow up):   · none     Outpatient Tests Requested:  · none    Complications:  none    Reason for Admission: confusion    Hospital Course:   Keith Campos is a 64 y o  female patient who originally presented to the hospital on 2/9/2022 due to self neglect and altered mental status  She improved upon admission  She reported not having heat at home and poor medical compliance with prescribed medications  She was resumed on all home meds including psych meds and she improved rapidly  She was setup with vna for medication management and discharged with outpatient followup  Please see above list of diagnoses and related plan for additional information  Condition at Discharge: stable    Discharge Day Visit / Exam:   Subjective:  Patient denies any acute complaints  Vitals: Blood Pressure: 152/75 (02/11/22 1509)  Pulse: (!) 109 (02/11/22 1509)  Temperature: 98 3 °F (36 8 °C) (02/11/22 1509)  Temp Source: Oral (02/09/22 1925)  Respirations: 18 (02/11/22 1509)  Height: 5' 4" (162 6 cm) (02/09/22 1925)  Weight - Scale: 76 4 kg (168 lb 6 9 oz) (02/10/22 0600)  SpO2: 99 % (02/11/22 1509)  Exam:   Physical Exam  Vitals and nursing note reviewed     Constitutional: General: She is not in acute distress  Appearance: She is well-developed  She is not ill-appearing, toxic-appearing or diaphoretic  HENT:      Head: Normocephalic and atraumatic  Eyes:      General: No scleral icterus  Conjunctiva/sclera: Conjunctivae normal    Cardiovascular:      Rate and Rhythm: Normal rate and regular rhythm  Heart sounds: No murmur heard  No friction rub  No gallop  Pulmonary:      Effort: Pulmonary effort is normal  No respiratory distress  Breath sounds: No stridor  No wheezing, rhonchi or rales  Chest:      Chest wall: No tenderness  Abdominal:      General: There is no distension  Palpations: Abdomen is soft  There is no mass  Tenderness: There is no abdominal tenderness  There is no guarding or rebound  Hernia: No hernia is present  Musculoskeletal:         General: No tenderness, deformity or signs of injury  Cervical back: Neck supple  Right lower leg: No edema  Left lower leg: No edema  Skin:     General: Skin is warm and dry  Coloration: Skin is not jaundiced  Findings: No bruising, erythema, lesion or rash  Neurological:      Mental Status: She is alert  Discussion with Family: Patient declined call to   Discharge instructions/Information to patient and family:   See after visit summary for information provided to patient and family  Provisions for Follow-Up Care:  See after visit summary for information related to follow-up care and any pertinent home health orders  Disposition:   Home with VNA Services (Reminder: Complete face to face encounter)    Planned Readmission: no     Discharge Statement:  I spent 35 minutes discharging the patient  This time was spent on the day of discharge  I had direct contact with the patient on the day of discharge   Greater than 50% of the total time was spent examining patient, answering all patient questions, arranging and discussing plan of care with patient as well as directly providing post-discharge instructions  Additional time then spent on discharge activities  Discharge Medications:  See after visit summary for reconciled discharge medications provided to patient and/or family        **Please Note: This note may have been constructed using a voice recognition system**

## 2022-02-12 NOTE — TELEPHONE ENCOUNTER
Patient is out of these medicaitons  She did not let me know until the end of the call that she was out, so I will send this to the hub as a HP, so it ws set for call hub  Pharmacy verified   She is aware that controlled substances cannot be called in after hourse  Sending to triage

## 2022-02-12 NOTE — ASSESSMENT & PLAN NOTE
Self neglect: possible depression, she is in a cold house as mother did not call for Heat repair  They would need outpatient CM to assist for heating in the house  Possibly due to not taking her medications, UTI and possible depression   Patient is not able to care for herself, which she was able before     Psych evaluation   CM consult   Possible rehab/fixing home issues and VNA to check up on daughter and mother     2/10/22:  Plan for safe discharge tomorrow, patient now obtain heat at her home and has been deemed medically stable by Psychiatry, she does not require any inpatient admission and has improved with resumption of her psychiatric medications      2/11/22: patient discharged home with vna for medication management

## 2022-02-13 ENCOUNTER — DOCUMENTATION (OUTPATIENT)
Dept: SOCIAL WORK | Facility: HOSPITAL | Age: 62
End: 2022-02-13

## 2022-02-13 ENCOUNTER — HOSPITAL ENCOUNTER (EMERGENCY)
Facility: HOSPITAL | Age: 62
Discharge: HOME | End: 2022-02-13
Attending: EMERGENCY MEDICINE | Admitting: EMERGENCY MEDICINE
Payer: MEDICARE

## 2022-02-13 VITALS
HEART RATE: 91 BPM | OXYGEN SATURATION: 100 % | DIASTOLIC BLOOD PRESSURE: 88 MMHG | TEMPERATURE: 98.2 F | RESPIRATION RATE: 18 BRPM | SYSTOLIC BLOOD PRESSURE: 165 MMHG

## 2022-02-13 DIAGNOSIS — F41.0 ANXIETY ATTACK: Primary | ICD-10-CM

## 2022-02-13 PROCEDURE — 99284 EMERGENCY DEPT VISIT MOD MDM: CPT | Performed by: EMERGENCY MEDICINE

## 2022-02-13 PROCEDURE — 99284 EMERGENCY DEPT VISIT MOD MDM: CPT

## 2022-02-13 RX ORDER — LORAZEPAM 2 MG/1
2 TABLET ORAL EVERY 6 HOURS PRN
Qty: 16 TABLET | Refills: 0 | Status: SHIPPED | OUTPATIENT
Start: 2022-02-13 | End: 2022-02-14 | Stop reason: SDUPTHER

## 2022-02-13 RX ORDER — ZIPRASIDONE HYDROCHLORIDE 40 MG/1
80 CAPSULE ORAL ONCE
Status: COMPLETED | OUTPATIENT
Start: 2022-02-13 | End: 2022-02-13

## 2022-02-13 RX ORDER — ZIPRASIDONE HYDROCHLORIDE 80 MG/1
80 CAPSULE ORAL DAILY
Qty: 90 CAPSULE | Refills: 0 | Status: SHIPPED | OUTPATIENT
Start: 2022-02-13 | End: 2022-03-07

## 2022-02-13 RX ORDER — LORAZEPAM 0.5 MG/1
2 TABLET ORAL ONCE
Status: COMPLETED | OUTPATIENT
Start: 2022-02-13 | End: 2022-02-13

## 2022-02-13 RX ADMIN — LORAZEPAM 2 MG: 0.5 TABLET ORAL at 19:36

## 2022-02-13 RX ADMIN — ZIPRASIDONE HYDROCHLORIDE 80 MG: 40 CAPSULE ORAL at 20:20

## 2022-02-13 NOTE — PROGRESS NOTES
Admission Report at [facility] has admitted your patient to [address] [place] [address] service with the following disciplines:      SN  This report is informational only, no responses is needed  Primary focus of home health care CARIDIAC RELATED TO HTN AND START OF AMLODIPINE  MED MGMT  Patient stated goals of care TO GET HELP IN HOME  Anticipated visit pattern and next visit date 2X A WEEK FOR 4 WEEKS NEXT VISIT 2 15 22  Significant clinical findings /95   REGULAR  NO MEDS IN HOME  Request for additional disciplines MSW ORDERED  Request for medication clarification NA  Request for other order clarification NA  Needs follow up physician appointments scheduled NEEDS PCP F/U  Potential barriers to goal achievement [address]  ANXIETY  HOARDING  LACK OF SUPPORT  Other pertinent information NA    Thank you for allowing us to participate in the care of your patient        Saira Olsen, [address] [facility]

## 2022-02-14 ENCOUNTER — TELEPHONE (OUTPATIENT)
Dept: INTERNAL MEDICINE CLINIC | Facility: CLINIC | Age: 62
End: 2022-02-14

## 2022-02-14 DIAGNOSIS — F41.9 ANXIETY: ICD-10-CM

## 2022-02-14 DIAGNOSIS — K22.10 EROSIVE ESOPHAGITIS: ICD-10-CM

## 2022-02-14 RX ORDER — PANTOPRAZOLE SODIUM 40 MG/1
40 TABLET, DELAYED RELEASE ORAL DAILY
Qty: 90 TABLET | Refills: 3 | Status: SHIPPED | OUTPATIENT
Start: 2022-02-14 | End: 2022-04-08 | Stop reason: HOSPADM

## 2022-02-14 RX ORDER — QUETIAPINE 400 MG/1
400 TABLET, FILM COATED, EXTENDED RELEASE ORAL
Qty: 90 TABLET | Refills: 3 | Status: SHIPPED | OUTPATIENT
Start: 2022-02-14

## 2022-02-14 RX ORDER — LORAZEPAM 2 MG/1
2 TABLET ORAL EVERY 6 HOURS PRN
Qty: 120 TABLET | Refills: 1 | Status: SHIPPED | OUTPATIENT
Start: 2022-02-14 | End: 2022-04-25

## 2022-02-14 RX ORDER — PAROXETINE 30 MG/1
TABLET, FILM COATED ORAL
Qty: 180 TABLET | Refills: 3 | Status: SHIPPED | OUTPATIENT
Start: 2022-02-14

## 2022-02-14 NOTE — TELEPHONE ENCOUNTER
I called pt, she wanted refills    Looks like I sent them yesterday and the day before, but she reports they weren't at the pharmacy when she went

## 2022-02-14 NOTE — DISCHARGE INSTRUCTIONS
Call you doctor tomorrow to have your medications refilled  Return to ER if you have shortness of breath with chest pain or fevers

## 2022-02-14 NOTE — ED PROVIDER NOTES
History  Chief Complaint   Patient presents with    Anxiety     Pt reports being oput of her psych meds for one week  Feeling increasingly anxious     63 yo F w/ hx of ABIMAEL presents w/ anxiety  2 hours prior to arrival pt reports acute onset shortness of breath and anxiousness  Discharged from hospital 2 days ago but was unable to  her medications from the pharmacy  Normally takes ativan prn and geodon at night for anxiety  Denies history of clots, chest pain, recent surgery, hx of cancer, trauma, syncope, abdominal pain, N/V/D, headache, or fever  Pt has no other medical concerns or pain  Prior to Admission Medications   Prescriptions Last Dose Informant Patient Reported? Taking?    LORazepam (ATIVAN) 2 mg tablet   No No   Sig: Take 1 tablet (2 mg total) by mouth every 6 (six) hours as needed for anxiety   PARoxetine (PAXIL) 30 mg tablet   No No   Sig: Take 2 tablets (60mg total) by mouth daily   acetaminophen (TYLENOL) 325 mg tablet  Self No No   Sig: Take 3 tablets (975 mg total) by mouth every 8 (eight) hours   amLODIPine (NORVASC) 5 mg tablet   No No   Sig: Take 1 tablet (5 mg total) by mouth daily   ferrous sulfate 325 (65 Fe) mg tablet   No No   Sig: Take 1 tablet (325 mg total) by mouth 2 (two) times a day with meals   levothyroxine 25 mcg tablet  Self No No   Sig: Take 1 tablet (25 mcg total) by mouth daily   ondansetron (ZOFRAN) 4 mg tablet   No No   Sig: Take 1 tablet (4 mg total) by mouth every 8 (eight) hours as needed for nausea or vomiting   pantoprazole (PROTONIX) 40 mg tablet   No No   Sig: Take 1 tablet (40 mg total) by mouth 2 (two) times a day   triamcinolone (KENALOG) 0 1 % cream   No No   Sig: Apply topically 2 (two) times a day as needed for rash   ziprasidone (GEODON) 80 mg capsule   No No   Sig: Take 1 capsule (80 mg total) by mouth daily      Facility-Administered Medications: None       Past Medical History:   Diagnosis Date    Anxiety     Arthritis     Back pain at L4-L5 level     Schreiber esophagus     Bulimia     Colon polyp     Disease of thyroid gland     hypothyroid    Ear problems     GERD (gastroesophageal reflux disease)     History of transfusion     Hyperlipidemia     Hypothyroidism     Leukopenia     Nasal congestion     NMS (neuroleptic malignant syndrome) 01/03/2020    Pneumonia     Rheumatoid arthritis involving right hip (HCC)     Sciatica     Seizure ([facility] [address] [facility]     due to medication mix up 8/2018 only one historically    Seizures ([facility] [address] [facility]     Synovial cyst of lumbar spine     Vertigo     Weight gain        Past Surgical History:   Procedure Laterality Date    BONE MARROW BIOPSY      BREAST SURGERY      enlargement with implants    CLOSED REDUCTION DISTAL FEMUR FRACTURE      COLONOSCOPY      COSMETIC SURGERY      DENTAL SURGERY      HIP ARTHROPLASTY Right 1/2/2020    Procedure: REVISION TOTAL HIP ARTHROPLASTY WITH REPAIR OF PARIPROSTHETIC FRACTURE - POSTERIOR APPROACH;  Surgeon: Waqar Fernandes MD;  [facility];  Service: Orthopedics    [place] ESOPHAGOGASTRODUODENOSCOPY TRANSORAL DIAGNOSTIC N/A 5/11/2021    Procedure: ESOPHAGOGASTRODUODENOSCOPY (EGD); Surgeon: Dilan Spear MD;  [facility];  Service: General    [place] ESOPHAGUS SURG PROCEDURE UNLISTED N/A 5/11/2021    Procedure: FUNDOPLICATION TRANSORAL INCISIONLESS;  Surgeon: Verdell Bernheim, MD;  [facility];  Service: Gastroenterology    [place] LAP, REPAIR PARAESOPHAGEAL HERNIA, INCL FUNDOPLASTY W/ MESH N/A 5/11/2021    Procedure: REPAIR HERNIA PARAESOPHAGEAL  LAPAROSCOPIC;  Surgeon:  Dilan Spear MD;  [facility];  Service: General    [place] TOTAL HIP ARTHROPLASTY Right 12/11/2019    Procedure: ARTHROPLASTY HIP TOTAL ANTERIOR;  Surgeon: Waqar Fernandes MD;  [facility];  Service: Orthopedics    RHINOPLASTY      SINUS SURGERY      TRANSORAL INCISIONLESS FUNDOPLICATION (TIF)  13/09/3484       Family History   Problem Relation Age of Onset    Uterine cancer Mother     Esophageal cancer Father     Colon cancer Maternal Grandmother      I have reviewed and agree with the history as documented  E-Cigarette/Vaping    E-Cigarette Use Never User      E-Cigarette/Vaping Substances    Nicotine No     THC No     CBD No     Flavoring No     Other No     Unknown No      Social History     Tobacco Use    Smoking status: Never Smoker    Smokeless tobacco: Never Used   Vaping Use    Vaping Use: Never used   Substance Use Topics    Alcohol use: Not Currently    Drug use: Not Currently        Review of Systems   All other systems reviewed and are negative  Physical Exam  ED Triage Vitals [02/13/22 1908]   Temperature Pulse Respirations Blood Pressure SpO2   98 2 °F (36 8 °C) 94 18 160/76 98 %      Temp Source Heart Rate Source Patient Position - Orthostatic VS BP Location FiO2 (%)   Oral Monitor Lying Right arm --      Pain Score       --             Orthostatic Vital Signs  Vitals:    02/13/22 1908 02/13/22 2021   BP: 160/76 165/88   Pulse: 94 91   Patient Position - Orthostatic VS: Lying        Physical Exam  Vitals and nursing note reviewed  Constitutional:       General: She is not in acute distress  Appearance: Normal appearance  She is normal weight  She is not ill-appearing, toxic-appearing or diaphoretic  HENT:      Head: Normocephalic and atraumatic  Right Ear: External ear normal       Left Ear: External ear normal       Nose: Nose normal       Mouth/Throat:      Mouth: Mucous membranes are moist       Pharynx: Oropharynx is clear  Eyes:      General:         Right eye: No discharge  Left eye: No discharge  Extraocular Movements: Extraocular movements intact  Conjunctiva/sclera: Conjunctivae normal       Pupils: Pupils are equal, round, and reactive to light  Cardiovascular:      Rate and Rhythm: Normal rate and regular rhythm  Pulses: Normal pulses  Heart sounds: Normal heart sounds   No murmur heard   No friction rub  Pulmonary:      Effort: Pulmonary effort is normal  No respiratory distress  Breath sounds: Normal breath sounds  No stridor  No wheezing or rales  Abdominal:      General: Abdomen is flat  Bowel sounds are normal  There is no distension  Palpations: Abdomen is soft  Tenderness: There is no abdominal tenderness  There is no guarding  Musculoskeletal:         General: No swelling or tenderness  Normal range of motion  Cervical back: Normal range of motion and neck supple  Right lower leg: No edema  Left lower leg: No edema  Skin:     General: Skin is warm and dry  Capillary Refill: Capillary refill takes less than 2 seconds  Coloration: Skin is not pale  Neurological:      Mental Status: She is alert and oriented to person, place, and time  Psychiatric:         Mood and Affect: Mood normal          Behavior: Behavior normal          ED Medications  Medications   LORazepam (ATIVAN) tablet 2 mg (2 mg Oral Given 2/13/22 1936)   ziprasidone (GEODON) capsule 80 mg (80 mg Oral Given 2/13/22 2020)       Diagnostic Studies  Results Reviewed     None                 No orders to display         Procedures  Procedures      ED Course                             SBIRT 22yo+      Most Recent Value   SBIRT (25 yo +)    In order to provide better care to our patients, we are screening all of our patients for alcohol and drug use  Would it be okay to ask you these screening questions? Yes Filed at: 02/13/2022 1916   Initial Alcohol Screen: US AUDIT-C     1  How often do you have a drink containing alcohol? 0 Filed at: 02/13/2022 1916   2  How many drinks containing alcohol do you have on a typical day you are drinking? 0 Filed at: 02/13/2022 1916   3a  Male UNDER 65: How often do you have five or more drinks on one occasion? 0 Filed at: 02/13/2022 1916   3b  FEMALE Any Age, or MALE 65+: How often do you have 4 or more drinks on one occassion?  0 Filed at: 02/13/2022 1916   Audit-C Score 0 Filed at: 02/13/2022 1916   MIA: How many times in the past year have you    Used an illegal drug or used a prescription medication for non-medical reasons? Never Filed at: 02/13/2022 1916                [facility]  Number of Diagnoses or Management Options  Anxiety attack: established and worsening  Diagnosis management comments: Impression: well-appearing 63 yo F w/ hx of ABIMAEL not on meds presents w/ anxiety  VS normal  No findings on exam  Not tachypneic  No chest pain  No risk factors for PE  Plan: administer night time geodon and prn ativan, reassess    Pt feels better after treatment  Feels well enough to go home  Instructed to call PCP tomorrow to re-prescribe medications  Pt given ED phone number to call if she is unable to reach her PCP  Amount and/or Complexity of Data Reviewed  Review and summarize past medical records: yes    Risk of Complications, Morbidity, and/or Mortality  Presenting problems: low  Diagnostic procedures: minimal  Management options: minimal    Patient Progress  Patient progress: resolved      Disposition  Final diagnoses:   Anxiety attack     Time reflects when diagnosis was documented in both MDM as applicable and the Disposition within this note     Time User Action Codes Description Comment    2/13/2022  7:30 PM Hermes Gamboa Add [F41 0] Anxiety attack       ED Disposition     ED Disposition Condition Date/Time Comment    Discharge Stable Sun Feb 13, 2022  7:30 PM Neymar Sharma discharge to home/self care              Follow-up Information     Follow up With Specialties Details Why Contact Info    Lizzette Sánchez MD Internal Medicine Call in 1 day For medication refill [id number]  [phone]            Discharge Medication List as of 2/13/2022  7:31 PM      CONTINUE these medications which have NOT CHANGED    Details   acetaminophen (TYLENOL) 325 mg tablet Take 3 tablets (975 mg total) by mouth every 8 (eight) hours, Starting Fri 5/14/2021, Normal      amLODIPine (NORVASC) 5 mg tablet Take 1 tablet (5 mg total) by mouth daily, Starting Fri 2/11/2022, Normal      ferrous sulfate 325 (65 Fe) mg tablet Take 1 tablet (325 mg total) by mouth 2 (two) times a day with meals, Starting Wed 1/12/2022, Normal      levothyroxine 25 mcg tablet Take 1 tablet (25 mcg total) by mouth daily, Starting Wed 5/19/2021, Normal      LORazepam (ATIVAN) 2 mg tablet Take 1 tablet (2 mg total) by mouth every 6 (six) hours as needed for anxiety, Starting Sun 2/13/2022, Normal      ondansetron (ZOFRAN) 4 mg tablet Take 1 tablet (4 mg total) by mouth every 8 (eight) hours as needed for nausea or vomiting, Starting Tue 2/1/2022, Normal      pantoprazole (PROTONIX) 40 mg tablet Take 1 tablet (40 mg total) by mouth 2 (two) times a day, Starting Fri 1/28/2022, Normal      PARoxetine (PAXIL) 30 mg tablet Take 2 tablets (60mg total) by mouth daily, Normal      triamcinolone (KENALOG) 0 1 % cream Apply topically 2 (two) times a day as needed for rash, Starting Tue 2/1/2022, Normal      ziprasidone (GEODON) 80 mg capsule Take 1 capsule (80 mg total) by mouth daily, Starting Sun 2/13/2022, Normal           No discharge procedures on file  PDMP Review       Value Time User    PDMP Reviewed  Yes 2/13/2022 10:31 AM Blayne Joel MD           ED Provider  Attending physically available and evaluated Tamica Olivares  DAMARIS managed the patient along with the ED Attending      Electronically Signed by         Deretha Dubin, MD  02/13/22 6347

## 2022-02-14 NOTE — TELEPHONE ENCOUNTER
Patient is requesting a call this morning to discuss her medications because the pharmacy told her she not due for meds but she states she is  Please call her to discuss  I offered her a virtual for this after noon she refused said she had to speak with you this morning

## 2022-02-14 NOTE — ED ATTENDING ATTESTATION
2/13/2022  IDes MD, saw and evaluated the patient  I have discussed the patient with the resident/non-physician practitioner and agree with the resident's/non-physician practitioner's findings, Plan of Care, and MDM as documented in the resident's/non-physician practitioner's note, except where noted  All available labs and Radiology studies were reviewed  I was present for key portions of any procedure(s) performed by the resident/non-physician practitioner and I was immediately available to provide assistance  At this point I agree with the current assessment done in the Emergency Department  I have conducted an independent evaluation of this patient a history and physical is as follows:    ED Course         Critical Care Time  Procedures    65 yo female with hx of anxiety, started with a panic attack with sob two hours ago  Pt recently admitted and discharged with all her psych meds  Pt has not been able to fill meds and not taking them  No fever, no n/v/d, no headache  No cp  Vss, afebrile, lungs cta, rrr, abdomen soft nontender, no neuro deficits  geodon po and ativan po and follow up with pcp tomorrow

## 2022-02-15 LAB
BACTERIA BLD CULT: NORMAL
BACTERIA BLD CULT: NORMAL

## 2022-02-17 ENCOUNTER — DOCUMENTATION (OUTPATIENT)
Dept: SOCIAL WORK | Facility: HOSPITAL | Age: 62
End: 2022-02-17

## 2022-02-17 NOTE — PROGRESS NOTES
Admission Report at [address] sent to ordering MD and PCP    [facility] has admitted your patient to [facility] service with the following disciplines:      SN and MSW  This report is informational only, no responses is needed  Primary focus of home health care: Cardiac  Patient stated goals of care: Get stronger healthier and clear head  Anticipated visit pattern: 2w4 and next visit date:  2 21 22 BP assess   Significant clinical findings: -Patient is comlaining of SOB which is improving since leaving hospital  Patient reports dentures are painful 7/10 with eating  Patient reports a 10 lb weight loss in last week  170lbs now  Patient reports house heat broke this morning  Patient is willing to pay for aide to Help take care of mother and keeping food in house and with medication managment  MSW ordered  2 13 22 patient reports that she was called by her insurance company stating that insurance was not covering the cost of her meds  Therefore patient has been without her meds for several days  Empty prescription bottles noted on coffee table  RN called [facility] pharmacy on [address]  pharm [employer] reported that meds were covered by insurance and were ready for   Patient notified and reports that she will retrieve from pharmacy today  2 17 22 Patient is taking multivitamin daily in addition to AVS  Pateint is missing iron  Patient is taking tylenol PRN not around the clock  Patient is does not think she needs and is missing levothyroxine  Patient is missing Amlodipine  According to [facility] at [facility] patient picked up amlodipine 2 13 22  Patient cannot locate amlodipine  2 13 22 /95 while sitting  regular and has weakness  2 17 22 /72 both arms  regular  Needs follow up physician appointments scheduled: PCP  Potential barriers to goal achievement: Lack of help in home    Hoarding situation within home old moldy food in kitchen, patient reports lack of food despite food in emilia  Patient has memory loss  Mother is in the hospital for unknown reason for two weeks per patient  Thank you for allowing us to participate in the care of your patient        Kourtney Bell RN

## 2022-02-21 ENCOUNTER — APPOINTMENT (OUTPATIENT)
Dept: LAB | Facility: CLINIC | Age: 62
End: 2022-02-21
Payer: MEDICARE

## 2022-02-21 DIAGNOSIS — N17.9 ACUTE RENAL FAILURE, UNSPECIFIED ACUTE RENAL FAILURE TYPE (HCC): ICD-10-CM

## 2022-02-21 LAB
ANION GAP SERPL CALCULATED.3IONS-SCNC: 12 MMOL/L (ref 4–13)
BUN SERPL-MCNC: 9 MG/DL (ref 5–25)
CALCIUM SERPL-MCNC: 9.2 MG/DL (ref 8.3–10.1)
CHLORIDE SERPL-SCNC: 103 MMOL/L (ref 100–108)
CO2 SERPL-SCNC: 21 MMOL/L (ref 21–32)
CREAT SERPL-MCNC: 0.57 MG/DL (ref 0.6–1.3)
GFR SERPL CREATININE-BSD FRML MDRD: 100 ML/MIN/1.73SQ M
GLUCOSE P FAST SERPL-MCNC: 126 MG/DL (ref 65–99)
MAGNESIUM SERPL-MCNC: 2.1 MG/DL (ref 1.6–2.6)
POTASSIUM SERPL-SCNC: 4.7 MMOL/L (ref 3.5–5.3)
SODIUM SERPL-SCNC: 136 MMOL/L (ref 136–145)

## 2022-02-21 PROCEDURE — 83735 ASSAY OF MAGNESIUM: CPT

## 2022-02-21 PROCEDURE — 36415 COLL VENOUS BLD VENIPUNCTURE: CPT

## 2022-02-21 PROCEDURE — 80048 BASIC METABOLIC PNL TOTAL CA: CPT

## 2022-02-24 NOTE — ASSESSMENT & PLAN NOTE
· Pt with urinary retention, suspect in setting of post-operative state/ambulatory dysfunction  · Bernardo placement  · Consider urology eval Yes

## 2022-03-03 ENCOUNTER — HOSPITAL ENCOUNTER (EMERGENCY)
Facility: HOSPITAL | Age: 62
Discharge: HOME | End: 2022-03-03
Attending: EMERGENCY MEDICINE
Payer: MEDICARE

## 2022-03-03 VITALS
TEMPERATURE: 98.4 F | HEART RATE: 106 BPM | OXYGEN SATURATION: 100 % | SYSTOLIC BLOOD PRESSURE: 159 MMHG | DIASTOLIC BLOOD PRESSURE: 84 MMHG | RESPIRATION RATE: 17 BRPM

## 2022-03-03 DIAGNOSIS — T78.40XA ALLERGIC REACTION, INITIAL ENCOUNTER: Primary | ICD-10-CM

## 2022-03-03 DIAGNOSIS — L85.3 DRY SKIN DERMATITIS: ICD-10-CM

## 2022-03-03 DIAGNOSIS — T07.XXXA MULTIPLE EXCORIATIONS: ICD-10-CM

## 2022-03-03 PROCEDURE — 99283 EMERGENCY DEPT VISIT LOW MDM: CPT

## 2022-03-03 PROCEDURE — 99282 EMERGENCY DEPT VISIT SF MDM: CPT | Performed by: EMERGENCY MEDICINE

## 2022-03-03 RX ORDER — DIPHENHYDRAMINE HCL 25 MG
25 TABLET ORAL ONCE
Status: COMPLETED | OUTPATIENT
Start: 2022-03-03 | End: 2022-03-03

## 2022-03-03 RX ADMIN — DIPHENHYDRAMINE HCL 25 MG: 25 TABLET ORAL at 13:26

## 2022-03-03 NOTE — ED PROVIDER NOTES
History  Chief Complaint   Patient presents with    Tongue Swelling     pt reports people were repairing heater on monday, unsure if they "tracked something in" and has started with a rash; today, pt states that her tongue is beginning to swell     69-year-old female with history anxiety and schizoaffective disorder presents to the emergency department for evaluation of tongue swelling  The patient reports that she started with a diffuse rash 4 days ago after a worker tract mud into her house  The patient states that she was using a paste that her mother was prescribed for a rash with good relief  The patient does not know what the name of the paste was  She states that she ran out of it today and used calamine lotion instead  She reports that soon afterwards she felt like her tongue was swelling so came to the emergency department for further evaluation  The patient did not take any medications to treat her tongue swelling prior to arrival to the emergency department  Patient denies difficulty breathing or trouble with handling her own secretions  Denies history of anaphylactic reactions  Prior to Admission Medications   Prescriptions Last Dose Informant Patient Reported? Taking?    LORazepam (ATIVAN) 2 mg tablet   No No   Sig: Take 1 tablet (2 mg total) by mouth every 6 (six) hours as needed for anxiety   PARoxetine (PAXIL) 30 mg tablet   No No   Sig: Take 2 tablets (60mg total) by mouth daily   QUEtiapine (SEROquel XR) 400 mg 24 hr tablet   No No   Sig: Take 1 tablet (400 mg total) by mouth daily at bedtime   acetaminophen (TYLENOL) 325 mg tablet  Self No No   Sig: Take 3 tablets (975 mg total) by mouth every 8 (eight) hours   amLODIPine (NORVASC) 5 mg tablet   No No   Sig: Take 1 tablet (5 mg total) by mouth daily   ferrous sulfate 325 (65 Fe) mg tablet   No No   Sig: Take 1 tablet (325 mg total) by mouth 2 (two) times a day with meals   levothyroxine 25 mcg tablet  Self No No   Sig: Take 1 tablet (25 mcg total) by mouth daily   ondansetron (ZOFRAN) 4 mg tablet   No No   Sig: Take 1 tablet (4 mg total) by mouth every 8 (eight) hours as needed for nausea or vomiting   pantoprazole (PROTONIX) 40 mg tablet   No No   Sig: Take 1 tablet (40 mg total) by mouth daily   ziprasidone (GEODON) 80 mg capsule   No No   Sig: Take 1 capsule (80 mg total) by mouth daily      Facility-Administered Medications: None       Past Medical History:   Diagnosis Date    Anxiety     Arthritis     Back pain at L4-L5 level     Schreiber esophagus     Bulimia     Colon polyp     Disease of thyroid gland     hypothyroid    Ear problems     GERD (gastroesophageal reflux disease)     History of transfusion     Hyperlipidemia     Hypothyroidism     Leukopenia     Nasal congestion     NMS (neuroleptic malignant syndrome) 01/03/2020    Pneumonia     Rheumatoid arthritis involving right hip (HCC)     Sciatica     Seizure ([facility] [address] )     due to medication mix up 8/2018 only one historically    Seizures ([facility] [address] )     Synovial cyst of lumbar spine     Vertigo     Weight gain        Past Surgical History:   Procedure Laterality Date    BONE MARROW BIOPSY      BREAST SURGERY      enlargement with implants    CLOSED REDUCTION DISTAL FEMUR FRACTURE      COLONOSCOPY      COSMETIC SURGERY      DENTAL SURGERY      HIP ARTHROPLASTY Right 1/2/2020    Procedure: REVISION TOTAL HIP ARTHROPLASTY WITH REPAIR OF PARIPROSTHETIC FRACTURE - POSTERIOR APPROACH;  Surgeon: Shailesh Shen MD;  [facility];  Service: Orthopedics    MA ESOPHAGOGASTRODUODENOSCOPY TRANSORAL DIAGNOSTIC N/A 5/11/2021    Procedure: ESOPHAGOGASTRODUODENOSCOPY (EGD); Surgeon:  Brisa Campos MD;  [facility];  Service: General    MA ESOPHAGUS SURG PROCEDURE UNLISTED N/A 5/11/2021    Procedure: FUNDOPLICATION TRANSORAL INCISIONLESS;  Surgeon: Andrews Smith MD;  [facility];  Service: Gastroenterology    MA LAP, REPAIR PARAESOPHAGEAL HERNIA, INCL FUNDOPLASTY W/ MESH N/A 5/11/2021    Procedure: REPAIR HERNIA PARAESOPHAGEAL  LAPAROSCOPIC;  Surgeon: Fermin Mackenzie MD;  [facility];  Service: General    UT TOTAL HIP ARTHROPLASTY Right 12/11/2019    Procedure: ARTHROPLASTY HIP TOTAL ANTERIOR;  Surgeon: Savannah Henson MD;  [facility];  Service: Orthopedics    RHINOPLASTY      SINUS SURGERY      TRANSORAL INCISIONLESS FUNDOPLICATION (TIF)  18/22/0845       Family History   Problem Relation Age of Onset    Uterine cancer Mother     Esophageal cancer Father     Colon cancer Maternal Grandmother      I have reviewed and agree with the history as documented  E-Cigarette/Vaping    E-Cigarette Use Never User      E-Cigarette/Vaping Substances    Nicotine No     THC No     CBD No     Flavoring No     Other No     Unknown No      Social History     Tobacco Use    Smoking status: Never Smoker    Smokeless tobacco: Never Used   Vaping Use    Vaping Use: Never used   Substance Use Topics    Alcohol use: Not Currently    Drug use: Not Currently        Review of Systems   Constitutional: Negative for chills and fever  HENT: Negative for ear pain and sore throat  Eyes: Negative for pain and visual disturbance  Respiratory: Negative for cough and shortness of breath  Cardiovascular: Negative for chest pain and palpitations  Gastrointestinal: Negative for abdominal pain and vomiting  Genitourinary: Negative for dysuria and hematuria  Musculoskeletal: Negative for arthralgias and back pain  Skin: Positive for rash  Negative for color change  Neurological: Negative for seizures and syncope  All other systems reviewed and are negative        Physical Exam  ED Triage Vitals [03/03/22 1209]   Temperature Pulse Respirations Blood Pressure SpO2   98 8 °F (37 1 °C) (!) 108 20 154/88 99 %      Temp Source Heart Rate Source Patient Position - Orthostatic VS BP Location FiO2 (%)   Temporal Monitor Lying Left arm -- Pain Score       No Pain             Orthostatic Vital Signs  Vitals:    03/03/22 1209 03/03/22 1229   BP: 154/88 159/84   Pulse: (!) 108 (!) 106   Patient Position - Orthostatic VS: Lying Lying       Physical Exam  Vitals and nursing note reviewed  Constitutional:       General: She is not in acute distress  Appearance: She is well-developed  HENT:      Head: Normocephalic and atraumatic  Mouth/Throat:      Mouth: Mucous membranes are moist  No oral lesions or angioedema  Dentition: No dental abscesses  Tongue: No lesions  Tongue does not deviate from midline  Pharynx: Oropharynx is clear  Uvula midline  No posterior oropharyngeal erythema or uvula swelling  Tonsils: No tonsillar exudate  Comments: No tongue swelling appreciated  Eyes:      Conjunctiva/sclera: Conjunctivae normal    Cardiovascular:      Rate and Rhythm: Normal rate and regular rhythm  Heart sounds: No murmur heard  Pulmonary:      Effort: Pulmonary effort is normal  No respiratory distress  Breath sounds: Normal breath sounds  Abdominal:      Palpations: Abdomen is soft  Tenderness: There is no abdominal tenderness  Musculoskeletal:      Cervical back: Neck supple  Skin:     General: Skin is warm and dry  Comments: Diffuse excoriations noted on the patient's trunk as well as upper and lower extremities bilaterally  Dry skin   Neurological:      Mental Status: She is alert           ED Medications  Medications   diphenhydrAMINE (BENADRYL) tablet 25 mg (25 mg Oral Given 3/3/22 1326)       Diagnostic Studies  Results Reviewed     None                 No orders to display         Procedures  Procedures      ED Course                 MDM  Number of Diagnoses or Management Options  Allergic reaction, initial encounter  Dry skin dermatitis  Multiple excoriations  Diagnosis management comments: 51-year-old female presented to the emergency department for evaluation of tongue swelling  On arrival the patient was awake, alert, oriented and in no acute distress  The patient had no signs of respiratory distress and was able to speak in complete sentences  On exam no tongue swelling was appreciated  The patient was noted to have diffuse excoriations and dry skin  The patient was given a dose of Benadryl for her pruritus  On re-evaluation the patient reported improvement of her symptoms  The patient was provided with moisturizing lotion and counseled on skin care  The patient is appropriate for discharge at this time with recommendation to follow up with her PCP for continued symptoms  Return precautions were discussed  Patient agrees with the plan for discharge and feels comfortable to go home with proper f/u  Advised to return for worsening or additional problems  All questions answered  Diagnosis, care plan and treatment options were discussed  The patient understands instructions and will follow up as directed  Disposition  Final diagnoses: Allergic reaction, initial encounter   Multiple excoriations   Dry skin dermatitis     Time reflects when diagnosis was documented in both MDM as applicable and the Disposition within this note     Time User Action Codes Description Comment    3/3/2022  2:15 PM Canary Handler Add [T78 40XA] Allergic reaction, initial encounter     3/3/2022  2:15 PM Canary Handler Add [T07  XXXA] Multiple excoriations     3/3/2022  2:24 PM Canary Handler Add [L85 3] Dry skin dermatitis       ED Disposition     ED Disposition Condition Date/Time Comment    Discharge Stable Thu Mar 3, 2022  2:15 PM Eli Mello discharge to home/self care              Follow-up Information     Follow up With Specialties Details Why Contact Info Additional Information    Cinthia Valera MD Internal Medicine Schedule an appointment as soon as possible for a visit   [address] 242 [phone] [address] [facility] Emergency Department Emergency Medicine Go to  If symptoms worsen [address]  [address] [facility] [address] Emergency Department, [address], [place], [place], [address]          Discharge Medication List as of 3/3/2022  2:25 PM      CONTINUE these medications which have NOT CHANGED    Details   acetaminophen (TYLENOL) 325 mg tablet Take 3 tablets (975 mg total) by mouth every 8 (eight) hours, Starting Fri 5/14/2021, Normal      amLODIPine (NORVASC) 5 mg tablet Take 1 tablet (5 mg total) by mouth daily, Starting Fri 2/11/2022, Normal      ferrous sulfate 325 (65 Fe) mg tablet Take 1 tablet (325 mg total) by mouth 2 (two) times a day with meals, Starting Wed 1/12/2022, Normal      levothyroxine 25 mcg tablet Take 1 tablet (25 mcg total) by mouth daily, Starting Wed 5/19/2021, Normal      LORazepam (ATIVAN) 2 mg tablet Take 1 tablet (2 mg total) by mouth every 6 (six) hours as needed for anxiety, Starting Mon 2/14/2022, Normal      ondansetron (ZOFRAN) 4 mg tablet Take 1 tablet (4 mg total) by mouth every 8 (eight) hours as needed for nausea or vomiting, Starting Tue 2/1/2022, Normal      pantoprazole (PROTONIX) 40 mg tablet Take 1 tablet (40 mg total) by mouth daily, Starting Mon 2/14/2022, Normal      PARoxetine (PAXIL) 30 mg tablet Take 2 tablets (60mg total) by mouth daily, Normal      QUEtiapine (SEROquel XR) 400 mg 24 hr tablet Take 1 tablet (400 mg total) by mouth daily at bedtime, Starting Mon 2/14/2022, Normal      ziprasidone (GEODON) 80 mg capsule Take 1 capsule (80 mg total) by mouth daily, Starting Sun 2/13/2022, Normal      triamcinolone (KENALOG) 0 1 % cream Apply topically 2 (two) times a day as needed for rash, Starting Tue 2/1/2022, Normal           No discharge procedures on file      PDMP Review       Value Time User    PDMP Reviewed  Yes 2/13/2022 10:31 AM Deonna Levin MD           ED Provider  Attending physically available and evaluated Neymar Sharma  I managed the patient along with the ED Attending      Electronically Signed by         Rayo Elizalde MD  03/06/22 2782

## 2022-03-03 NOTE — ED ATTENDING ATTESTATION
3/3/2022  Paula Omer DO, saw and evaluated the patient  I have discussed the patient with the resident/non-physician practitioner and agree with the resident's/non-physician practitioner's findings, Plan of Care, and MDM as documented in the resident's/non-physician practitioner's note, except where noted  All available labs and Radiology studies were reviewed  I was present for key portions of any procedure(s) performed by the resident/non-physician practitioner and I was immediately available to provide assistance  At this point I agree with the current assessment done in the Emergency Department  I have conducted an independent evaluation of this patient a history and physical is as follows:    61-year-old woman with history anxiety and schizoaffective disorder presents for evaluation of reported tongue swelling  She also reports an associated rash which is diffuse and pruritic  She states that symptoms started 4 days ago after some workers tract but into her house  Apparently her mother has been using calamine lotion on it which she states made her tongue swell  Denies difficulty swallowing, breathing, wheezing, nausea vomiting, abdominal pain  No trismus  Tongue appears unremarkable  Lips are not edematous  She is tolerating secretions and has normal phonation  There is no stridor  Neck is supple, no meningismus  Skin has multiple areas of excoriation and scab without other type of rash suggesting urticaria or hives    Patient requesting to cleanse the calamine lotion off her using bath in a bag  Explained that I do not know for have this in stock but wheeled removed the calamine lotion from her skin somehow  She then requested ice chips and a spoon  These were provided  Impression:  Feared complaint not found plan:  Removed calamine lotion, consider antihistamines if she has continued pruritus  Discharge home        ED Course         Critical Care Time  Procedures

## 2022-03-04 ENCOUNTER — TELEPHONE (OUTPATIENT)
Dept: INTERNAL MEDICINE CLINIC | Facility: CLINIC | Age: 62
End: 2022-03-04

## 2022-03-04 DIAGNOSIS — R21 RASH: ICD-10-CM

## 2022-03-04 RX ORDER — TRIAMCINOLONE ACETONIDE 1 MG/G
CREAM TOPICAL 2 TIMES DAILY PRN
Qty: 160 G | Refills: 5 | Status: SHIPPED | OUTPATIENT
Start: 2022-03-04 | End: 2022-03-07

## 2022-03-04 NOTE — TELEPHONE ENCOUNTER
The patient was in the ER yesterday with a swollen tongue  The patient was given benadryl  She stated her swelling has gone down but the benadryl does nothing for it  She still has a rash  The patient has requested 2 tubes of kenalog   I put in a script request  Thank you

## 2022-03-08 ENCOUNTER — TELEPHONE (OUTPATIENT)
Dept: INTERNAL MEDICINE CLINIC | Facility: CLINIC | Age: 62
End: 2022-03-08

## 2022-03-08 NOTE — TELEPHONE ENCOUNTER
Bianka Estevez is discharging Lauren Eric from skill nursing  Lauren Eric does not have any skills that she may need help with  She was informed to follow up with her PCP  Bianka Estevez stated Irene's rash looks like bug bites, maybe fleas  Laurenbrant Eric has been using the kenalog and she said it is helping her

## 2022-03-09 ENCOUNTER — TELEPHONE (OUTPATIENT)
Dept: INTERNAL MEDICINE CLINIC | Facility: CLINIC | Age: 62
End: 2022-03-09

## 2022-03-09 NOTE — TELEPHONE ENCOUNTER
I spoke with the patient she is stating that her tongue and lips are swollen  No other symptoms  The patient was instructed to go to the ER for evaluation due to the swelling  The patient refused and would like Dr Timmy Warner to order medication for her

## 2022-03-09 NOTE — TELEPHONE ENCOUNTER
Informed patient  Patient scrubbed the house  She feels she located the area where they are  She thinks it is a chair in her living room and possibly a mattress

## 2022-03-09 NOTE — TELEPHONE ENCOUNTER
Likely unrelated to the flea bites, but she could take Benadryl for this, and if the Benadryl makes her too drowsy, then Claritin

## 2022-03-09 NOTE — TELEPHONE ENCOUNTER
The patient's lips and tongue are swelling  She thinks it is because of the flea bites  The patient would like you call something in for her      This was triaged to [facility]

## 2022-03-09 NOTE — TELEPHONE ENCOUNTER
Tiffany called very upset  She is completely covered with flea bites  She is sore and itchy  Irene cannot care for her mom  Kehinde Vidales won't eat, "she would rather starve than eat" irene would like for emelyn to be hospitalized but her mom won't go  I suggested an ambulance and irene said emelyn would just send them away  Tiffany said she would go to the hospital herself but then there is no care for Vonda Nanda contacted agencies yesterday to help with the situation  Kehinde Vidales through them all out      Is there anything else we can do to help

## 2022-03-09 NOTE — TELEPHONE ENCOUNTER
The patient stated her lips and tongue are swollen  She thinks it is because of the flea bites      This was triaged by juanita

## 2022-03-18 DIAGNOSIS — E03.9 ACQUIRED HYPOTHYROIDISM: ICD-10-CM

## 2022-03-18 RX ORDER — LEVOTHYROXINE SODIUM 0.03 MG/1
TABLET ORAL
Qty: 90 TABLET | Refills: 3 | Status: SHIPPED | OUTPATIENT
Start: 2022-03-18

## 2022-03-25 ENCOUNTER — APPOINTMENT (OUTPATIENT)
Dept: RADIOLOGY | Age: 62
End: 2022-03-25
Payer: MEDICARE

## 2022-03-25 ENCOUNTER — PATIENT OUTREACH (OUTPATIENT)
Dept: INTERNAL MEDICINE CLINIC | Facility: CLINIC | Age: 62
End: 2022-03-25

## 2022-03-25 ENCOUNTER — TELEPHONE (OUTPATIENT)
Dept: INTERNAL MEDICINE CLINIC | Facility: CLINIC | Age: 62
End: 2022-03-25

## 2022-03-25 DIAGNOSIS — Z59.9 PROBLEM RELATED TO LIVING ARRANGEMENT: Primary | ICD-10-CM

## 2022-03-25 DIAGNOSIS — R05.9 COUGH: Primary | ICD-10-CM

## 2022-03-25 DIAGNOSIS — R05.9 COUGH: ICD-10-CM

## 2022-03-25 PROCEDURE — 71046 X-RAY EXAM CHEST 2 VIEWS: CPT

## 2022-03-25 SDOH — ECONOMIC STABILITY - INCOME SECURITY: PROBLEM RELATED TO HOUSING AND ECONOMIC CIRCUMSTANCES, UNSPECIFIED: Z59.9

## 2022-03-25 NOTE — CASE MANAGEMENT
Case Management Progress Note    Patient name Antonio Cunningham  Location ED 08/ED 08 MRN [id number]  : 1960 Date 3/25/2022       LOS (days): 0  Geometric Mean LOS (GMLOS) (days):   Days to GMLOS:        OBJECTIVE:        Current admission status: Emergency  Preferred Pharmacy:   [facility] [place], [address] [place] [address]  [facility] [id number]  Phone: [phone] Fax: [phone]    Primary Care Provider: Maci Craig MD    Primary Insurance: Cass Crew MEDICARE [facility] RE[facility]  Secondary Insurance: [address] [place] [address]    PROGRESS NOTE:      Pt keeps calling and leaving 's for this Cm   Insisting that this CM worked with her last week during an admission  CM called Dr Baudilio Crawford spoke to [place] and requested the OP CM call the patient to assist her with her needs

## 2022-03-25 NOTE — TELEPHONE ENCOUNTER
The patient was told while in the hospital that she needed to speak with a   Her insurance will cover one visit from a   The patient would like to move she is having difficulty dealing with her mother  The patient wants guidance to help her improve her situation  Her ER discharge states: "Needs follow up physician appointments scheduled: PCP  Potential barriers to goal achievement: Lack of help in home  Hoarding situation within home old moldy food in kitchen, patient reports lack of food despite food in fridge  Patient has memory loss"    The patient kept telling me she was confused or that she could not remember

## 2022-03-25 NOTE — TELEPHONE ENCOUNTER
Pt requesting an order for a chest x-ray, says their furnace broke and soot has gone all throughout the home an d she has been breathing it in

## 2022-03-25 NOTE — PROGRESS NOTES
Request received from PCP office for OP SWCM to outreach patient and assess needs  Patient informed PCP office that she would like to speak with  about moving as she is having difficulty with her mother in the home  Call placed to patient to further assess and discuss options  No answer and unable to leave voicemail as mailbox is full  Call placed to [facility] to discuss open cases for patient and patient's mother and to inform of new concern as patient is wanting to move; patient is current decision-maker for her mother as capacity evaluation completed on 2/11 during recent hospitalization states patient's mother lacks capacity at this time  Confirmed with Jelani Camarena at [facility] that patent does not have case open but patient's mother's cases are still open with [facility]  Jeronimo Simmonds [phone] regarding the Options Program and APS  Aron Caballero [phone] regarding guardianship/other care concerns  Detailed voicemail left for Ann Childs regarding ongoing concerns and to inform of OP SWCM's inability to reach patient

## 2022-04-01 ENCOUNTER — TELEPHONE (OUTPATIENT)
Dept: INTERNAL MEDICINE CLINIC | Facility: CLINIC | Age: 62
End: 2022-04-01

## 2022-04-01 DIAGNOSIS — I10 HYPERTENSION: ICD-10-CM

## 2022-04-02 RX ORDER — AMLODIPINE BESYLATE 5 MG/1
5 TABLET ORAL DAILY
Qty: 30 TABLET | Refills: 5 | Status: SHIPPED | OUTPATIENT
Start: 2022-04-02 | End: 2022-07-10

## 2022-04-05 ENCOUNTER — NURSE TRIAGE (OUTPATIENT)
Dept: OTHER | Facility: OTHER | Age: 62
End: 2022-04-05

## 2022-04-05 NOTE — TELEPHONE ENCOUNTER
Patient calling in stating she has had difficulty breathing and SOB for 2 weeks now  She was diagnosed with high BP when she was in the hospital a month ago and has not been taking her BP medication because she didn't know that's what it was for  She can't walk more that 2-3 steps without getting SOB, lightheaded and sweaty  She has periods of nausea and vomiting 2-3 times a day  She feels like her heart is racing  She has no way to monitor her BP or SPO2 at home  She has no one with her now  Instructed patient to call 911 immediately after hanging up the phone  Patient asks if she can just take her BP medication and that will make it better  Informed patient that she needs to be evaluated for her symptoms and her BP medication will not treat her current symptoms  Emphasized that patient needs to call 911 immediately after getting off the phone  Patient states she will do that

## 2022-04-05 NOTE — TELEPHONE ENCOUNTER
Regarding: SOB and SWEATING -AM I HAVING A HEART ATTACK  ----- Message from [employer] sent at 4/5/2022  9:07 AM EDT -----  " I am SOB and SWEATING am I having a heart attack "

## 2022-04-05 NOTE — TELEPHONE ENCOUNTER
Reason for Disposition   MODERATE difficulty breathing (e g , speaks in phrases, SOB even at rest, pulse 100-120) of new-onset or worse than normal    Answer Assessment - Initial Assessment Questions  1  RESPIRATORY STATUS: "Describe your breathing?" (e g , wheezing, shortness of breath, unable to speak, severe coughing)       Shortness of breath after taking a couple steps  Can't walk in her house without getting SOB, lightheaded, and sweating  2  ONSET: "When did this breathing problem begin?"       Difficulty breathing for a couple weeks  3  PATTERN "Does the difficult breathing come and go, or has it been constant since it started?"      Constant for 2 weeks when walking  4  SEVERITY: "How bad is your breathing?" (e g , mild, moderate, severe)     - MILD: No SOB at rest, mild SOB with walking, speaks normally in sentences, can lay down, no retractions, pulse < 100      - MODERATE: SOB at rest, SOB with minimal exertion and prefers to sit, cannot lie down flat, speaks in phrases, mild retractions, audible wheezing, pulse 100-120      - SEVERE: Very SOB at rest, speaks in single words, struggling to breathe, sitting hunched forward, retractions, pulse > 120       Moderate-- SOB after taking a couple steps, feels like heart is racing and gets sweaty and nauseous  5  RECURRENT SYMPTOM: "Have you had difficulty breathing before?" If Yes, ask: "When was the last time?" and "What happened that time?"       Only for the past 2 weeks  6  CARDIAC HISTORY: "Do you have any history of heart disease?" (e g , heart attack, angina, bypass surgery, angioplasty)       No, was recently dx with high BP and has not been taking her BP medication  7  LUNG HISTORY: "Do you have any history of lung disease?"  (e g , pulmonary embolus, asthma, emphysema)      No     8  CAUSE: "What do you think is causing the breathing problem?"       Unsure     9   OTHER SYMPTOMS: "Do you have any other symptoms? (e g , dizziness, runny nose, cough, chest pain, fever)     Patient gets nauseous and vomits a couple times a day  Sweating and some lightheadedness with walking       11  TRAVEL: "Have you traveled out of the country in the last month?" (e g , travel history, exposures)        No    Protocols used: BREATHING DIFFICULTY-ADULT-OH

## 2022-04-06 ENCOUNTER — HOSPITAL ENCOUNTER (INPATIENT)
Facility: HOSPITAL | Age: 62
LOS: 2 days | Discharge: HOME | DRG: 378 | End: 2022-04-08
Attending: EMERGENCY MEDICINE | Admitting: INTERNAL MEDICINE
Payer: MEDICARE

## 2022-04-06 ENCOUNTER — APPOINTMENT (EMERGENCY)
Dept: RADIOLOGY | Facility: HOSPITAL | Age: 62
DRG: 378 | End: 2022-04-06
Payer: MEDICARE

## 2022-04-06 DIAGNOSIS — D64.9 SYMPTOMATIC ANEMIA: ICD-10-CM

## 2022-04-06 DIAGNOSIS — K22.10 EROSIVE ESOPHAGITIS: ICD-10-CM

## 2022-04-06 DIAGNOSIS — K22.11 ULCER OF ESOPHAGUS WITH BLEEDING: ICD-10-CM

## 2022-04-06 DIAGNOSIS — K92.2 UPPER GI BLEED: ICD-10-CM

## 2022-04-06 DIAGNOSIS — K22.70 BARRETT'S ESOPHAGUS: ICD-10-CM

## 2022-04-06 DIAGNOSIS — R06.00 DYSPNEA: Primary | ICD-10-CM

## 2022-04-06 DIAGNOSIS — R53.83 FATIGUE: ICD-10-CM

## 2022-04-06 LAB
2HR DELTA HS TROPONIN: -1 NG/L
4HR DELTA HS TROPONIN: 0 NG/L
ABO GROUP BLD: NORMAL
ALBUMIN SERPL BCP-MCNC: 3.4 G/DL (ref 3.5–5)
ALP SERPL-CCNC: 69 U/L (ref 46–116)
ALT SERPL W P-5'-P-CCNC: 25 U/L (ref 12–78)
ANION GAP SERPL CALCULATED.3IONS-SCNC: 3 MMOL/L (ref 4–13)
AST SERPL W P-5'-P-CCNC: 16 U/L (ref 5–45)
ATRIAL RATE: 90 BPM
BASOPHILS # BLD AUTO: 0.06 THOUSANDS/ΜL (ref 0–0.1)
BASOPHILS NFR BLD AUTO: 1 % (ref 0–1)
BILIRUB SERPL-MCNC: 0.32 MG/DL (ref 0.2–1)
BILIRUB UR QL STRIP: NEGATIVE
BLD GP AB SCN SERPL QL: NEGATIVE
BUN SERPL-MCNC: 6 MG/DL (ref 5–25)
CALCIUM ALBUM COR SERPL-MCNC: 9.4 MG/DL (ref 8.3–10.1)
CALCIUM SERPL-MCNC: 8.9 MG/DL (ref 8.3–10.1)
CARDIAC TROPONIN I PNL SERPL HS: 2 NG/L
CARDIAC TROPONIN I PNL SERPL HS: 3 NG/L
CARDIAC TROPONIN I PNL SERPL HS: 3 NG/L
CHLORIDE SERPL-SCNC: 93 MMOL/L (ref 100–108)
CLARITY UR: CLEAR
CO2 SERPL-SCNC: 31 MMOL/L (ref 21–32)
COLOR UR: COLORLESS
CREAT SERPL-MCNC: 0.7 MG/DL (ref 0.6–1.3)
D DIMER PPP FEU-MCNC: 0.67 UG/ML FEU
EOSINOPHIL # BLD AUTO: 0.21 THOUSAND/ΜL (ref 0–0.61)
EOSINOPHIL NFR BLD AUTO: 5 % (ref 0–6)
ERYTHROCYTE [DISTWIDTH] IN BLOOD BY AUTOMATED COUNT: 21.8 % (ref 11.6–15.1)
FLUAV RNA RESP QL NAA+PROBE: NEGATIVE
FLUBV RNA RESP QL NAA+PROBE: NEGATIVE
GFR SERPL CREATININE-BSD FRML MDRD: 93 ML/MIN/1.73SQ M
GLUCOSE SERPL-MCNC: 89 MG/DL (ref 65–140)
GLUCOSE UR STRIP-MCNC: NEGATIVE MG/DL
HCT VFR BLD AUTO: 21.9 % (ref 34.8–46.1)
HCT VFR BLD AUTO: 26.5 % (ref 34.8–46.1)
HEMOCCULT STL QL: NEGATIVE
HEMOCCULT STL QL: NORMAL
HEMOCCULT STL QL: NORMAL
HGB BLD-MCNC: 5.6 G/DL (ref 11.5–15.4)
HGB BLD-MCNC: 7.3 G/DL (ref 11.5–15.4)
HGB UR QL STRIP.AUTO: NEGATIVE
IMM GRANULOCYTES # BLD AUTO: 0.02 THOUSAND/UL (ref 0–0.2)
IMM GRANULOCYTES NFR BLD AUTO: 0 % (ref 0–2)
KETONES UR STRIP-MCNC: NEGATIVE MG/DL
LEUKOCYTE ESTERASE UR QL STRIP: NEGATIVE
LYMPHOCYTES # BLD AUTO: 0.55 THOUSANDS/ΜL (ref 0.6–4.47)
LYMPHOCYTES NFR BLD AUTO: 12 % (ref 14–44)
MCH RBC QN AUTO: 15.5 PG (ref 26.8–34.3)
MCHC RBC AUTO-ENTMCNC: 25.6 G/DL (ref 31.4–37.4)
MCV RBC AUTO: 61 FL (ref 82–98)
MONOCYTES # BLD AUTO: 0.52 THOUSAND/ΜL (ref 0.17–1.22)
MONOCYTES NFR BLD AUTO: 12 % (ref 4–12)
NEUTROPHILS # BLD AUTO: 3.15 THOUSANDS/ΜL (ref 1.85–7.62)
NEUTS SEG NFR BLD AUTO: 70 % (ref 43–75)
NITRITE UR QL STRIP: NEGATIVE
NRBC BLD AUTO-RTO: 1 /100 WBCS
P AXIS: 63 DEGREES
PH UR STRIP.AUTO: 7 [PH]
PLATELET # BLD AUTO: 306 THOUSANDS/UL (ref 149–390)
PMV BLD AUTO: 8.6 FL (ref 8.9–12.7)
POTASSIUM SERPL-SCNC: 3.7 MMOL/L (ref 3.5–5.3)
PR INTERVAL: 124 MS
PROT SERPL-MCNC: 6.8 G/DL (ref 6.4–8.2)
PROT UR STRIP-MCNC: NEGATIVE MG/DL
QRS AXIS: 22 DEGREES
QRSD INTERVAL: 76 MS
QT INTERVAL: 346 MS
QTC INTERVAL: 423 MS
RBC # BLD AUTO: 3.62 MILLION/UL (ref 3.81–5.12)
RH BLD: POSITIVE
RSV RNA RESP QL NAA+PROBE: NEGATIVE
SARS-COV-2 RNA RESP QL NAA+PROBE: NEGATIVE
SODIUM SERPL-SCNC: 127 MMOL/L (ref 136–145)
SP GR UR STRIP.AUTO: 1 (ref 1–1.03)
SPECIMEN EXPIRATION DATE: NORMAL
T WAVE AXIS: 74 DEGREES
TSH SERPL DL<=0.05 MIU/L-ACNC: 1.29 UIU/ML (ref 0.45–4.5)
UROBILINOGEN UR STRIP-ACNC: <2 MG/DL
VENTRICULAR RATE: 90 BPM
WBC # BLD AUTO: 4.51 THOUSAND/UL (ref 4.31–10.16)

## 2022-04-06 PROCEDURE — 0241U HB NFCT DS VIR RESP RNA 4 TRGT: CPT | Performed by: EMERGENCY MEDICINE

## 2022-04-06 PROCEDURE — 36415 COLL VENOUS BLD VENIPUNCTURE: CPT | Performed by: EMERGENCY MEDICINE

## 2022-04-06 PROCEDURE — 99285 EMERGENCY DEPT VISIT HI MDM: CPT | Performed by: EMERGENCY MEDICINE

## 2022-04-06 PROCEDURE — 84484 ASSAY OF TROPONIN QUANT: CPT | Performed by: INTERNAL MEDICINE

## 2022-04-06 PROCEDURE — 86901 BLOOD TYPING SEROLOGIC RH(D): CPT | Performed by: EMERGENCY MEDICINE

## 2022-04-06 PROCEDURE — 99223 1ST HOSP IP/OBS HIGH 75: CPT | Performed by: INTERNAL MEDICINE

## 2022-04-06 PROCEDURE — 30233N1 TRANSFUSION OF NONAUTOLOGOUS RED BLOOD CELLS INTO PERIPHERAL VEIN, PERCUTANEOUS APPROACH: ICD-10-PCS | Performed by: EMERGENCY MEDICINE

## 2022-04-06 PROCEDURE — 86900 BLOOD TYPING SEROLOGIC ABO: CPT | Performed by: EMERGENCY MEDICINE

## 2022-04-06 PROCEDURE — 86850 RBC ANTIBODY SCREEN: CPT | Performed by: EMERGENCY MEDICINE

## 2022-04-06 PROCEDURE — 93005 ELECTROCARDIOGRAM TRACING: CPT

## 2022-04-06 PROCEDURE — 93010 ELECTROCARDIOGRAM REPORT: CPT | Performed by: INTERNAL MEDICINE

## 2022-04-06 PROCEDURE — 84484 ASSAY OF TROPONIN QUANT: CPT | Performed by: EMERGENCY MEDICINE

## 2022-04-06 PROCEDURE — 80053 COMPREHEN METABOLIC PANEL: CPT | Performed by: EMERGENCY MEDICINE

## 2022-04-06 PROCEDURE — 85014 HEMATOCRIT: CPT | Performed by: INTERNAL MEDICINE

## 2022-04-06 PROCEDURE — 71045 X-RAY EXAM CHEST 1 VIEW: CPT

## 2022-04-06 PROCEDURE — 85379 FIBRIN DEGRADATION QUANT: CPT | Performed by: EMERGENCY MEDICINE

## 2022-04-06 PROCEDURE — 86923 COMPATIBILITY TEST ELECTRIC: CPT

## 2022-04-06 PROCEDURE — 85025 COMPLETE CBC W/AUTO DIFF WBC: CPT | Performed by: EMERGENCY MEDICINE

## 2022-04-06 PROCEDURE — 85018 HEMOGLOBIN: CPT | Performed by: INTERNAL MEDICINE

## 2022-04-06 PROCEDURE — P9016 RBC LEUKOCYTES REDUCED: HCPCS

## 2022-04-06 PROCEDURE — 82272 OCCULT BLD FECES 1-3 TESTS: CPT | Performed by: INTERNAL MEDICINE

## 2022-04-06 PROCEDURE — 36430 TRANSFUSION BLD/BLD COMPNT: CPT

## 2022-04-06 PROCEDURE — 81003 URINALYSIS AUTO W/O SCOPE: CPT | Performed by: INTERNAL MEDICINE

## 2022-04-06 PROCEDURE — 99285 EMERGENCY DEPT VISIT HI MDM: CPT

## 2022-04-06 PROCEDURE — 84443 ASSAY THYROID STIM HORMONE: CPT | Performed by: INTERNAL MEDICINE

## 2022-04-06 RX ORDER — LORAZEPAM 1 MG/1
2 TABLET ORAL EVERY 6 HOURS PRN
Status: DISCONTINUED | OUTPATIENT
Start: 2022-04-06 | End: 2022-04-08 | Stop reason: HOSPADM

## 2022-04-06 RX ORDER — LEVOTHYROXINE SODIUM 0.03 MG/1
25 TABLET ORAL
Status: DISCONTINUED | OUTPATIENT
Start: 2022-04-07 | End: 2022-04-08 | Stop reason: HOSPADM

## 2022-04-06 RX ORDER — ONDANSETRON 4 MG/1
4 TABLET, ORALLY DISINTEGRATING ORAL EVERY 6 HOURS PRN
Status: DISCONTINUED | OUTPATIENT
Start: 2022-04-06 | End: 2022-04-08 | Stop reason: HOSPADM

## 2022-04-06 RX ORDER — QUETIAPINE 400 MG/1
400 TABLET, FILM COATED, EXTENDED RELEASE ORAL
Status: DISCONTINUED | OUTPATIENT
Start: 2022-04-06 | End: 2022-04-08 | Stop reason: HOSPADM

## 2022-04-06 RX ORDER — AMLODIPINE BESYLATE 5 MG/1
5 TABLET ORAL DAILY
Status: DISCONTINUED | OUTPATIENT
Start: 2022-04-06 | End: 2022-04-08 | Stop reason: HOSPADM

## 2022-04-06 RX ORDER — DIAPER,BRIEF,INFANT-TODD,DISP
EACH MISCELLANEOUS 4 TIMES DAILY PRN
Status: DISCONTINUED | OUTPATIENT
Start: 2022-04-06 | End: 2022-04-08 | Stop reason: HOSPADM

## 2022-04-06 RX ORDER — ZIPRASIDONE HYDROCHLORIDE 40 MG/1
80 CAPSULE ORAL DAILY
Status: DISCONTINUED | OUTPATIENT
Start: 2022-04-06 | End: 2022-04-08 | Stop reason: HOSPADM

## 2022-04-06 RX ORDER — ACETAMINOPHEN 325 MG/1
650 TABLET ORAL EVERY 6 HOURS PRN
Status: DISCONTINUED | OUTPATIENT
Start: 2022-04-06 | End: 2022-04-08 | Stop reason: HOSPADM

## 2022-04-06 RX ORDER — PAROXETINE HYDROCHLORIDE 20 MG/1
60 TABLET, FILM COATED ORAL DAILY
Status: DISCONTINUED | OUTPATIENT
Start: 2022-04-06 | End: 2022-04-08 | Stop reason: HOSPADM

## 2022-04-06 RX ADMIN — QUETIAPINE FUMARATE 400 MG: 400 TABLET, EXTENDED RELEASE ORAL at 22:05

## 2022-04-06 NOTE — ASSESSMENT & PLAN NOTE
Continue home medications; no acute issues  Patient with history of NMS and serotonin syndrome on record; avoid medication changes  Consult behavioral health as needed

## 2022-04-06 NOTE — H&P
[facility] 1960, 64 [facility]  [facility] MRN: [id number]  [facility] En[facility]: [id number]  Primary Care Provider: Janet Patrick MD   Date and time admitted to hospital: 4/6/2022 10:32 AM    * Iron deficiency anemia due to chronic blood loss  Assessment & Plan  Lab Results   Component Value Date    IRON 52 01/11/2022    TIBC 442 01/11/2022    FERRITIN 8 01/11/2022     Patient presents with 2 weeks of shortness of breath and vomiting  Reports vomiting was dark and assumed dark colon was has a result of chronic diet soda ingestion  Hemoglobin in the ED 5 6; shortness of breath likely secondary to symptomatic anemia in the setting of chronic iron deficiency anemia  Patient with recent iron panel findings as above; concerning for iron deficiency anemia with normal TIBC and low ferritin  On iron supplementation b i d  at home  Received 1 unit PRBCs in the ED; will trend and repeat PRBC infusions as needed  Likely secondary to upper versus lower GI bleed; consult to Gastroenterology  IV PPI; IV fluids considered, but will hold given grade 1 diastolic dysfunction at this time; 2 large-bore IVs; NPO      Hiatal hernia with gastroesophageal reflux disease and esophagitis  Assessment & Plan  Patient reports long history of GERD and Schreiber's esophagus  Will initiate IV PPI b i d    Concern for upper GI bleed; consulted Gastroenterology    Hyponatremia  Assessment & Plan  Sodium 127 on admission; has had multiple episodes of hypernatremia past  Correct underlying GI bleed and nausea/vomiting; monitor on daily labs  If does not improve with fluid restriction and symptomatic care; consult Nephrology    Essential hypertension  Assessment & Plan  Blood pressure relatively well controlled; continue Norvasc    ABIMAEL (generalized anxiety disorder)  Assessment & Plan  Patient denies any acute symptoms; continue home medications    Schizoaffective disorder [facility])  Assessment & Plan  Continue home medications; no acute issues  Patient with history of NMS and serotonin syndrome on record; avoid medication changes  Consult behavioral health as needed      Acquired hypothyroidism  Assessment & Plan  Continue levothyroxine 25 mcg daily      VTE Prophylaxis: Pharmacologic VTE Prophylaxis contraindicated due to Possible GI bleed  / sequential compression device   Code Status:  Full  POLST: POLST form is not discussed and not completed at this time  Discussion with family: Care plan discussed with patient who voiced understanding and agrees with recommendations  Anticipated Length of Stay:  Patient will be admitted on an Inpatient basis with an anticipated length of stay of  greater than 2 midnights  Justification for Hospital Stay:  Symptomatic anemia    Total Time for Visit, including Counseling / Coordination of Care: 60 minutes  Greater than 50% of this total time spent on direct patient counseling and coordination of care  Chief Complaint:   Shortness of breath/dyspnea on exertion    History of Present Illness:    Eric Evans is a 64 y o  female with past medical history of hypothyroidism, anxiety, hypertension, GERD, hyperlipidemia, iron deficiency anemia, schizoaffective disorder presents with 2 weeks of vomiting and shortness of breath  Patient reports her main concern is shortness of breath and dyspnea on exertion after only walking a few steps  She states now, when she goes to the grocery store or does errands run house, after 3 or 4 steps she has to sit down and catch her breath  She reports this has been going on 2 weeks and concurrently, has been having daily vomiting of black substance that she attributed to diet Coke  Patient denies abdominal pain, headaches, any other acute issues; is a never smoker, drinks no alcohol and takes no illicit drugs  She does report a history of Barretts esophagus and hiatal hernia    She denies any hematuria or diarrhea or noticeable blood in stool  Review of Systems:    Review of Systems   Constitutional: Positive for activity change  Negative for chills and fever  HENT: Negative for ear pain and sore throat  Eyes: Negative for pain and visual disturbance  Respiratory: Positive for shortness of breath  Negative for cough  Cardiovascular: Negative for chest pain and palpitations  Gastrointestinal: Positive for nausea and vomiting  Negative for abdominal pain, anal bleeding and blood in stool  Genitourinary: Negative for dysuria and hematuria  Musculoskeletal: Negative for arthralgias and back pain  Skin: Positive for color change, rash and wound  Neurological: Positive for light-headedness  Negative for seizures and syncope  All other systems reviewed and are negative        Past Medical and Surgical History:     Past Medical History:   Diagnosis Date    Anxiety     Arthritis     Back pain at L4-L5 level     Schreiber esophagus     Bulimia     Colon polyp     Disease of thyroid gland     hypothyroid    Ear problems     GERD (gastroesophageal reflux disease)     History of transfusion     Hyperlipidemia     Hypothyroidism     Leukopenia     Nasal congestion     NMS (neuroleptic malignant syndrome) 01/03/2020    Pneumonia     Rheumatoid arthritis involving right hip (HCC)     Sciatica     Seizure ([address] )     due to medication mix up 8/2018 only one historically    Seizures ([address] )     Synovial cyst of lumbar spine     Vertigo     Weight gain        Past Surgical History:   Procedure Laterality Date    BONE MARROW BIOPSY      BREAST SURGERY      enlargement with implants    CLOSED REDUCTION DISTAL FEMUR FRACTURE      COLONOSCOPY      COSMETIC SURGERY      DENTAL SURGERY      HIP ARTHROPLASTY Right 1/2/2020    Procedure: REVISION TOTAL HIP ARTHROPLASTY WITH REPAIR OF PARIPROSTHETIC FRACTURE - POSTERIOR APPROACH;  Surgeon: Sonia Torres MD;  [facility]; Service: Orthopedics    WI ESOPHAGOGASTRODUODENOSCOPY TRANSORAL DIAGNOSTIC N/A 5/11/2021    Procedure: ESOPHAGOGASTRODUODENOSCOPY (EGD); Surgeon: Breanne Soliz MD;  [facility];  Service: General    WI ESOPHAGUS SURG PROCEDURE UNLISTED N/A 5/11/2021    Procedure: FUNDOPLICATION TRANSORAL INCISIONLESS;  Surgeon: Ashlyn Ledbetter MD;  [facility];  Service: Gastroenterology    WI LAP, REPAIR PARAESOPHAGEAL HERNIA, INCL FUNDOPLASTY W/ MESH N/A 5/11/2021    Procedure: REPAIR HERNIA PARAESOPHAGEAL  LAPAROSCOPIC;  Surgeon: Breanne Soliz MD;  [facility];  Service: General    WI TOTAL HIP ARTHROPLASTY Right 12/11/2019    Procedure: ARTHROPLASTY HIP TOTAL ANTERIOR;  Surgeon: Marvin Danielle MD;  [facility];  Service: Orthopedics    RHINOPLASTY      SINUS SURGERY      TRANSORAL INCISIONLESS FUNDOPLICATION (TIF)  89/68/5191       Meds/Allergies:    Prior to Admission medications    Medication Sig Start Date End Date Taking?  Authorizing Provider   acetaminophen (TYLENOL) 325 mg tablet Take 3 tablets (975 mg total) by mouth every 8 (eight) hours 5/14/21   ABENA Quinones   amLODIPine (NORVASC) 5 mg tablet Take 1 tablet (5 mg total) by mouth daily 4/2/22   Aysha Blanco MD   ferrous sulfate 325 (65 Fe) mg tablet Take 1 tablet (325 mg total) by mouth 2 (two) times a day with meals 1/12/22   Tejas Rabago PA-C   levothyroxine 25 mcg tablet TAKE 1 TABLET BY MOUTH EVERY DAY 3/18/22   Aysha Blanco MD   LORazepam (ATIVAN) 2 mg tablet Take 1 tablet (2 mg total) by mouth every 6 (six) hours as needed for anxiety 2/14/22   Aysha Blanco MD   ondansetron [facility]) 4 mg tablet Take 1 tablet (4 mg total) by mouth every 8 (eight) hours as needed for nausea or vomiting 2/1/22   Aysha Blanco MD   pantoprazole (PROTONIX) 40 mg tablet Take 1 tablet (40 mg total) by mouth daily 2/14/22   Aysha Blanco MD   PARoxetine (PAXIL) 30 mg tablet Take 2 tablets (60mg total) by mouth daily 2/14/22   Nikko Pate MD   QUEtiapine (SEROquel XR) 400 mg 24 hr tablet Take 1 tablet (400 mg total) by mouth daily at bedtime 2/14/22   Nikko Pate MD   triamcinolone (KENALOG) 0 1 % cream APPLY 1 APPLICATION TOPICALLY 2 (TWO) TIMES A DAY TO AFFECTED AREA 3/7/22   Nikko Pate MD   ziprasidone (GEODON) 80 mg capsule TAKE 1 CAPSULE BY MOUTH EVERY DAY 3/7/22   Nikko Pate MD     I have reviewed home medications with patient personally  Allergies: Allergies   Allergen Reactions    Risperdal [Risperidone] Shortness Of Breath     Rapid heart beat, SOB    Zyprexa [Olanzapine] Shortness Of Breath     Rapid heartbeat    Latex Rash     Band aids, adhesives wears normal underwear, can eat bananas  USE PAPER TAPE       Social History:     Marital Status: Single   Occupation:   Patient Pre-hospital Living Situation:  Independently/caregiver for her mother  Patient Pre-hospital Level of Mobility:  Full  Patient Pre-hospital Diet Restrictions:  None  Substance Use History:   Social History     Substance and Sexual Activity   Alcohol Use Not Currently     Social History     Tobacco Use   Smoking Status Never Smoker   Smokeless Tobacco Never Used     Social History     Substance and Sexual Activity   Drug Use Not Currently       Family History:    Family History   Problem Relation Age of Onset    Uterine cancer Mother     Esophageal cancer Father     Colon cancer Maternal Grandmother        Physical Exam:     Vitals:   Blood Pressure: 130/71 (04/06/22 1330)  Pulse: 90 (04/06/22 1330)  Temperature: 98 2 °F (36 8 °C) (04/06/22 1330)  Temp Source: Oral (04/06/22 1330)  Respirations: 18 (04/06/22 1330)  SpO2: 95 % (04/06/22 1330)    Physical Exam  Vitals and nursing note reviewed  Constitutional:       General: She is not in acute distress  Appearance: She is well-developed  HENT:      Head: Normocephalic and atraumatic     Eyes:      Conjunctiva/sclera: Conjunctivae normal    Cardiovascular:      Rate and Rhythm: Normal rate and regular rhythm  Heart sounds: No murmur heard  Pulmonary:      Effort: Pulmonary effort is normal  No respiratory distress  Abdominal:      General: Abdomen is flat  Bowel sounds are normal       Palpations: Abdomen is soft  Tenderness: There is no abdominal tenderness  There is no guarding  Musculoskeletal:      Cervical back: Neck supple  Skin:     General: Skin is warm and dry  Comments: Excoriations on bilateral shins   Neurological:      Mental Status: She is alert and oriented to person, place, and time  Psychiatric:         Behavior: Behavior normal            Additional Data:     Lab Results: I have personally reviewed pertinent reports  Results from last 7 days   Lab Units 04/06/22  1046   WBC Thousand/uL 4 51   HEMOGLOBIN g/dL 5 6*   HEMATOCRIT % 21 9*   PLATELETS Thousands/uL 306   NEUTROS PCT % 70   LYMPHS PCT % 12*   MONOS PCT % 12   EOS PCT % 5     Results from last 7 days   Lab Units 04/06/22  1046   SODIUM mmol/L 127*   POTASSIUM mmol/L 3 7   CHLORIDE mmol/L 93*   CO2 mmol/L 31   BUN mg/dL 6   CREATININE mg/dL 0 70   ANION GAP mmol/L 3*   CALCIUM mg/dL 8 9   ALBUMIN g/dL 3 4*   TOTAL BILIRUBIN mg/dL 0 32   ALK PHOS U/L 69   ALT U/L 25   AST U/L 16   GLUCOSE RANDOM mg/dL 89                       Imaging: I have personally reviewed pertinent reports  XR chest 1 view portable   Final Result by Jerson Sam MD (04/06 7777)      No acute cardiopulmonary disease  Workstation performed: [id number]             EKG, Pathology, and Other Studies Reviewed on Admission:       Allscripts / Epic Records Reviewed: Yes     ** Please Note: This note has been constructed using a voice recognition system   **

## 2022-04-06 NOTE — ASSESSMENT & PLAN NOTE
Lab Results   Component Value Date    IRON 52 01/11/2022    TIBC 442 01/11/2022    FERRITIN 8 01/11/2022     Patient presents with 2 weeks of shortness of breath and vomiting  Reports vomiting was dark and assumed dark colon was has a result of chronic diet soda ingestion  Hemoglobin in the ED 5 6; shortness of breath likely secondary to symptomatic anemia in the setting of chronic iron deficiency anemia  Patient with recent iron panel findings as above; concerning for iron deficiency anemia with normal TIBC and low ferritin  On iron supplementation b i d  at home  Received 1 unit PRBCs in the ED; will trend and repeat PRBC infusions as needed  Likely secondary to upper versus lower GI bleed; consult to Gastroenterology  IV PPI; IV fluids considered, but will hold given grade 1 diastolic dysfunction at this time; 2 large-bore IVs; NPO

## 2022-04-06 NOTE — ASSESSMENT & PLAN NOTE
Sodium 127 on admission; has had multiple episodes of hypernatremia past  Correct underlying GI bleed and nausea/vomiting; monitor on daily labs  If does not improve with fluid restriction and symptomatic care; consult Nephrology

## 2022-04-06 NOTE — ED NOTES
Patient ambulatory to restroom at this time  Steady gait noted  Pt denies any complaints while standing       Camille Contreras RN  04/06/22 6012

## 2022-04-06 NOTE — ASSESSMENT & PLAN NOTE
Patient reports long history of GERD and Schreiber's esophagus  Will initiate IV PPI b i d    Concern for upper GI bleed; consulted Gastroenterology

## 2022-04-06 NOTE — ED PROVIDER NOTES
History  Chief Complaint   Patient presents with    Shortness of Breath     Pt reports SOBx2 weeks, worse with walking, sweating, "dark" vomiting; denies cp, denies diarrhea, denies abdo pain       History provided by:  Patient   used: No    Shortness of Breath  Severity:  Moderate  Onset quality:  Gradual  Duration:  2 weeks  Timing:  Intermittent  Progression:  Waxing and waning  Chronicity:  New  Context: activity    Context: not animal exposure, not emotional upset, not fumes, not known allergens, not occupational exposure, not pollens, not smoke exposure, not strong odors, not URI and not weather changes    Relieved by:  Nothing  Worsened by:  Nothing  Ineffective treatments:  None tried  Associated symptoms: diaphoresis and vomiting    Associated symptoms: no abdominal pain, no chest pain, no claudication, no cough, no ear pain, no fever, no headaches, no hemoptysis, no neck pain, no PND, no rash, no sore throat, no sputum production, no syncope, no swollen glands and no wheezing    Risk factors: no recent alcohol use, no family hx of DVT, no hx of cancer, no hx of PE/DVT, no obesity, no oral contraceptive use, no prolonged immobilization, no recent surgery and no tobacco use        Prior to Admission Medications   Prescriptions Last Dose Informant Patient Reported? Taking?    LORazepam (ATIVAN) 2 mg tablet   No No   Sig: Take 1 tablet (2 mg total) by mouth every 6 (six) hours as needed for anxiety   PARoxetine (PAXIL) 30 mg tablet   No No   Sig: Take 2 tablets (60mg total) by mouth daily   QUEtiapine (SEROquel XR) 400 mg 24 hr tablet   No No   Sig: Take 1 tablet (400 mg total) by mouth daily at bedtime   acetaminophen (TYLENOL) 325 mg tablet  Self No No   Sig: Take 3 tablets (975 mg total) by mouth every 8 (eight) hours   amLODIPine (NORVASC) 5 mg tablet   No No   Sig: Take 1 tablet (5 mg total) by mouth daily   ferrous sulfate 325 (65 Fe) mg tablet   No No   Sig: Take 1 tablet (325 mg total) by mouth 2 (two) times a day with meals   levothyroxine 25 mcg tablet   No No   Sig: TAKE 1 TABLET BY MOUTH EVERY DAY   ondansetron (ZOFRAN) 4 mg tablet   No No   Sig: Take 1 tablet (4 mg total) by mouth every 8 (eight) hours as needed for nausea or vomiting   pantoprazole (PROTONIX) 40 mg tablet   No No   Sig: Take 1 tablet (40 mg total) by mouth daily   triamcinolone (KENALOG) 0 1 % cream   No No   Sig: APPLY 1 APPLICATION TOPICALLY 2 (TWO) TIMES A DAY TO AFFECTED AREA   ziprasidone (GEODON) 80 mg capsule   No No   Sig: TAKE 1 CAPSULE BY MOUTH EVERY DAY      Facility-Administered Medications: None       Past Medical History:   Diagnosis Date    Anxiety     Arthritis     Back pain at L4-L5 level     Schreiber esophagus     Bulimia     Colon polyp     Disease of thyroid gland     hypothyroid    Ear problems     GERD (gastroesophageal reflux disease)     History of transfusion     Hyperlipidemia     Hypothyroidism     Leukopenia     Nasal congestion     NMS (neuroleptic malignant syndrome) 01/03/2020    Pneumonia     Rheumatoid arthritis involving right hip (HCC)     Sciatica     Seizure (HCC)     due to medication mix up 8/2018 only one historically    Seizures ([address] )     Synovial cyst of lumbar spine     Vertigo     Weight gain        Past Surgical History:   Procedure Laterality Date    BONE MARROW BIOPSY      BREAST SURGERY      enlargement with implants    CLOSED REDUCTION DISTAL FEMUR FRACTURE      COLONOSCOPY      COSMETIC SURGERY      DENTAL SURGERY      HIP ARTHROPLASTY Right 1/2/2020    Procedure: REVISION TOTAL HIP ARTHROPLASTY WITH REPAIR OF PARIPROSTHETIC FRACTURE - POSTERIOR APPROACH;  Surgeon: Mervat Norton MD;  [facility];  Service: Orthopedics    TN ESOPHAGOGASTRODUODENOSCOPY TRANSORAL DIAGNOSTIC N/A 5/11/2021    Procedure: ESOPHAGOGASTRODUODENOSCOPY (EGD); Surgeon:  Randy Ocasio MD;  [facility];  Service: General    TN ESOPHAGUS SURG PROCEDURE UNLISTED N/A 5/11/2021    Procedure: FUNDOPLICATION TRANSORAL INCISIONLESS;  Surgeon: Burt Trevino MD;  [facility];  Service: Gastroenterology    WI LAP, REPAIR PARAESOPHAGEAL HERNIA, INCL FUNDOPLASTY W/ MESH N/A 5/11/2021    Procedure: REPAIR HERNIA PARAESOPHAGEAL  LAPAROSCOPIC;  Surgeon: Kelly De La O MD;  [facility];  Service: General    WI TOTAL HIP ARTHROPLASTY Right 12/11/2019    Procedure: ARTHROPLASTY HIP TOTAL ANTERIOR;  Surgeon: Souleymane Valderrama MD;  [facility];  Service: Orthopedics    RHINOPLASTY      SINUS SURGERY      TRANSORAL INCISIONLESS FUNDOPLICATION (TIF)  56/73/4107       Family History   Problem Relation Age of Onset    Uterine cancer Mother     Esophageal cancer Father     Colon cancer Maternal Grandmother      I have reviewed and agree with the history as documented  E-Cigarette/Vaping    E-Cigarette Use Never User      E-Cigarette/Vaping Substances    Nicotine No     THC No     CBD No     Flavoring No     Other No     Unknown No      Social History     Tobacco Use    Smoking status: Never Smoker    Smokeless tobacco: Never Used   Vaping Use    Vaping Use: Never used   Substance Use Topics    Alcohol use: Not Currently    Drug use: Not Currently       Review of Systems   Constitutional: Positive for diaphoresis  Negative for activity change, chills, fatigue and fever  HENT: Negative for ear pain, sore throat, trouble swallowing and voice change  Eyes: Negative for pain and visual disturbance  Respiratory: Positive for shortness of breath  Negative for cough, hemoptysis, sputum production, choking and wheezing  Cardiovascular: Negative for chest pain, claudication, leg swelling, syncope and PND  Gastrointestinal: Positive for vomiting  Negative for abdominal distention, abdominal pain and diarrhea  Endocrine: Negative for polydipsia and polyuria     Genitourinary: Negative for difficulty urinating, dysuria and flank pain    Musculoskeletal: Negative for neck pain and neck stiffness  Skin: Negative for color change and rash  Neurological: Negative for dizziness, speech difficulty and headaches  Hematological: Does not bruise/bleed easily  Psychiatric/Behavioral: Negative for agitation, behavioral problems, hallucinations and suicidal ideas  Physical Exam  Physical Exam  HENT:      Head: Normocephalic and atraumatic  Eyes:      Conjunctiva/sclera: Conjunctivae normal       Pupils: Pupils are equal, round, and reactive to light  Cardiovascular:      Rate and Rhythm: Normal rate and regular rhythm  Heart sounds: No murmur heard  Pulmonary:      Effort: Pulmonary effort is normal  No respiratory distress  Breath sounds: Normal breath sounds  Abdominal:      General: Bowel sounds are normal  There is no distension  Palpations: Abdomen is soft  Tenderness: There is no abdominal tenderness  Comments: No Signs of Peritonitis    Musculoskeletal:         General: Normal range of motion  Cervical back: Normal range of motion and neck supple  Skin:     General: Skin is warm and dry  Capillary Refill: Capillary refill takes less than 2 seconds  Coloration: Skin is not pale  Findings: No rash  Neurological:      Mental Status: She is alert and oriented to person, place, and time  GCS: GCS eye subscore is 4  GCS verbal subscore is 5  GCS motor subscore is 6        Comments: Normal speech, Normal gait, No Focal neurologic deficits    Psychiatric:         Behavior: Behavior normal          Vital Signs  ED Triage Vitals   Temperature Pulse Respirations Blood Pressure SpO2   04/06/22 1040 04/06/22 1037 04/06/22 1037 04/06/22 1037 04/06/22 1037   98 4 °F (36 9 °C) 101 22 143/73 97 %      Temp Source Heart Rate Source Patient Position - Orthostatic VS BP Location FiO2 (%)   04/06/22 1040 04/06/22 1037 04/06/22 1037 04/06/22 1037 --   Oral Monitor Lying Right arm       Pain Score       --                  Vitals:    04/06/22 1037   BP: 143/73   Pulse: 101   Patient Position - Orthostatic VS: Lying         Visual Acuity      ED Medications  Medications - No data to display    Diagnostic Studies  Results Reviewed     Procedure Component Value Units Date/Time    HS Troponin I 4hr [[id number]]  (Normal) Collected: 04/06/22 1528    Lab Status: Final result Specimen: Blood from Arm, Right Updated: 04/06/22 1626     hs TnI 4hr 3 ng/L      Delta 4hr hsTnI 0 ng/L     TSH, 3rd generation [[id number]]  (Normal) Collected: 04/06/22 1046    Lab Status: Final result Specimen: Blood from Arm, Left Updated: 04/06/22 1539     TSH 3RD GENERATON 1 290 uIU/mL     Narrative:      Patients undergoing fluorescein dye angiography may retain small amounts of fluorescein in the body for 48-72 hours post procedure  Samples containing fluorescein can produce falsely depressed TSH values  If the patient had this procedure,a specimen should be resubmitted post fluorescein clearance  HS Troponin I 2hr [[id number]]  (Normal) Collected: 04/06/22 1309    Lab Status: Final result Specimen: Blood from Hand, Right Updated: 04/06/22 1353     hs TnI 2hr 2 ng/L      Delta 2hr hsTnI -1 ng/L     COVID/FLU/RSV - 2 hour TAT [[id number]]  (Normal) Collected: 04/06/22 1046    Lab Status: Final result Specimen: Nares from Nasopharyngeal Swab Updated: 04/06/22 1136     SARS-CoV-2 Negative     INFLUENZA A PCR Negative     INFLUENZA B PCR Negative     RSV PCR Negative    Narrative:      FOR PEDIATRIC PATIENTS - copy/paste COVID Guidelines URL to browser: [email or web address]    SARS-CoV-2 assay is a Nucleic Acid Amplification assay intended for the  qualitative detection of nucleic acid from SARS-CoV-2 in nasopharyngeal  swabs  Results are for the presumptive identification of SARS-CoV-2 RNA      Positive results are indicative of infection with SARS-CoV-2, the virus  causing COVID-19, but do not rule out bacterial infection or co-infection  with other viruses  Laboratories within the [place] and its  territories are required to report all positive results to the appropriate  public health authorities  Negative results do not preclude SARS-CoV-2  infection and should not be used as the sole basis for treatment or other  patient management decisions  Negative results must be combined with  clinical observations, patient history, and epidemiological information  This test has not been FDA cleared or approved  This test has been authorized by FDA under an Emergency Use Authorization  (EUA)  This test is only authorized for the duration of time the  declaration that circumstances exist justifying the authorization of the  emergency use of an in vitro diagnostic tests for detection of SARS-CoV-2  virus and/or diagnosis of COVID-19 infection under section 564(b)(1) of  the Act, 21 U  S C  330AFD-2(U)(6), unless the authorization is terminated  or revoked sooner  The test has been validated but independent review by FDA  and CLIA is pending  Test performed using [employer] Gene[employer]pert: This RT-PCR assay targets N2,  a region unique to SARS-CoV-2  A conserved region in the E-gene was chosen  for pan-Sarbecovirus detection which includes SARS-CoV-2      HS Troponin 0hr (reflex protocol) [[id number]]  (Normal) Collected: 04/06/22 1046    Lab Status: Final result Specimen: Blood from Arm, Left Updated: 04/06/22 1131     hs TnI 0hr 3 ng/L     Comprehensive metabolic panel [[id number]]  (Abnormal) Collected: 04/06/22 1046    Lab Status: Final result Specimen: Blood from Arm, Left Updated: 04/06/22 1116     Sodium 127 mmol/L      Potassium 3 7 mmol/L      Chloride 93 mmol/L      CO2 31 mmol/L      ANION GAP 3 mmol/L      BUN 6 mg/dL      Creatinine 0 70 mg/dL      Glucose 89 mg/dL      Calcium 8 9 mg/dL      Corrected Calcium 9 4 mg/dL      AST 16 U/L      ALT 25 U/L      Alkaline Phosphatase 69 U/L      Total Protein 6 8 g/dL      Albumin 3 4 g/dL      Total Bilirubin 0 32 mg/dL      eGFR 93 ml/min/1 73sq m     Narrative:      National Kidney Disease Foundation guidelines for Chronic Kidney Disease (CKD):     Stage 1 with normal or high GFR (GFR > 90 mL/min/1 73 square meters)    Stage 2 Mild CKD (GFR = 60-89 mL/min/1 73 square meters)    Stage 3A Moderate CKD (GFR = 45-59 mL/min/1 73 square meters)    Stage 3B Moderate CKD (GFR = 30-44 mL/min/1 73 square meters)    Stage 4 Severe CKD (GFR = 15-29 mL/min/1 73 square meters)    Stage 5 End Stage CKD (GFR <15 mL/min/1 73 square meters)  Note: GFR calculation is accurate only with a steady state creatinine    D-Dimer [[id number]]  (Abnormal) Collected: 04/06/22 1046    Lab Status: Final result Specimen: Blood from Arm, Left Updated: 04/06/22 1115     D-Dimer, Quant 0 67 ug/ml FEU     Narrative: In the evaluation for possible pulmonary embolism, in the appropriate (Well's Score of 4 or less) patient, the age adjusted d-dimer cutoff for this patient can be calculated as:    Age x 0 01 (in ug/mL) for Age-adjusted D-dimer exclusion threshold for a patient over 50 years      CBC and differential [[id number]]  (Abnormal) Collected: 04/06/22 1046    Lab Status: Final result Specimen: Blood from Arm, Left Updated: 04/06/22 1108     WBC 4 51 Thousand/uL      RBC 3 62 Million/uL      Hemoglobin 5 6 g/dL      Hematocrit 21 9 %      MCV 61 fL      MCH 15 5 pg      MCHC 25 6 g/dL      RDW 21 8 %      MPV 8 6 fL      Platelets 870 Thousands/uL      nRBC 1 /100 WBCs      Neutrophils Relative 70 %      Immat GRANS % 0 %      Lymphocytes Relative 12 %      Monocytes Relative 12 %      Eosinophils Relative 5 %      Basophils Relative 1 %      Neutrophils Absolute 3 15 Thousands/µL      Immature Grans Absolute 0 02 Thousand/uL      Lymphocytes Absolute 0 55 Thousands/µL      Monocytes Absolute 0 52 Thousand/µL      Eosinophils Absolute 0 21 Thousand/µL      Basophils Absolute 0 06 Thousands/µL                  XR chest 1 view portable   Final Result by Ileana Whalen MD (04/06 3177)      No acute cardiopulmonary disease  Workstation performed: [id number]                    Procedures  Procedures         ED Course                                            MDM  Number of Diagnoses or Management Options  Dyspnea: new and requires workup  Fatigue: new and requires workup  Symptomatic anemia: new and requires workup  Diagnosis management comments: Patinet presenst with gradual onset fatigue , dyspnea and found to be anemic hgb 5 on lab eval   H/o Barrets per patient - patien denies BRBPR or hematemasis    Will transfuse  Admit for further eval        Amount and/or Complexity of Data Reviewed  Clinical lab tests: ordered and reviewed  Tests in the radiology section of CPT®: reviewed and ordered  Tests in the medicine section of CPT®: reviewed and ordered  Discuss the patient with other providers: yes  Independent visualization of images, tracings, or specimens: yes    Risk of Complications, Morbidity, and/or Mortality  Presenting problems: moderate  Diagnostic procedures: moderate  Management options: moderate    Patient Progress  Patient progress: stable      Disposition  Final diagnoses:   Dyspnea   Fatigue   Symptomatic anemia     Time reflects when diagnosis was documented in both MDM as applicable and the Disposition within this note     Time User Action Codes Description Comment    4/6/2022 12:23 PM Brody Mungo Add [R06 00] Dyspnea     4/6/2022 12:23 PM Brody Mungo Add [R53 83] Fatigue     4/6/2022 12:23 PM Brody Mungo Add [D64 9] Symptomatic anemia     4/6/2022  1:50 PM Sam Amin Add [K92 2] Upper GI bleed     4/7/2022  8:10 AM Xin Davalos Add [K22 10] Erosive esophagitis       ED Disposition     ED Disposition Condition Date/Time Comment    Admit Stable Wed Apr 6, 2022 12:23 PM Case was discussed with CHERYL and the patient's admission status was agreed to be Admission Status: inpatient status to the service of Dr Steven Lagunas   Follow-up Information    None         Patient's Medications   Discharge Prescriptions    No medications on file       No discharge procedures on file      PDMP Review       Value Time User    PDMP Reviewed  Yes 2/13/2022 10:31 AM Suhail Segal MD          ED Provider  Electronically Signed by           Eleni Rowe MD  04/07/22 0033

## 2022-04-07 PROBLEM — D50.9 MICROCYTIC ANEMIA: Status: ACTIVE | Noted: 2022-04-07

## 2022-04-07 PROBLEM — F99 PSYCHIATRIC DISORDER: Status: ACTIVE | Noted: 2018-08-21

## 2022-04-07 LAB
ABO GROUP BLD BPU: NORMAL
ALBUMIN SERPL BCP-MCNC: 3.1 G/DL (ref 3.5–5)
ALP SERPL-CCNC: 70 U/L (ref 46–116)
ALT SERPL W P-5'-P-CCNC: 24 U/L (ref 12–78)
ANION GAP SERPL CALCULATED.3IONS-SCNC: 4 MMOL/L (ref 4–13)
AST SERPL W P-5'-P-CCNC: 28 U/L (ref 5–45)
BILIRUB SERPL-MCNC: 0.44 MG/DL (ref 0.2–1)
BPU ID: NORMAL
BUN SERPL-MCNC: 5 MG/DL (ref 5–25)
CALCIUM ALBUM COR SERPL-MCNC: 9.7 MG/DL (ref 8.3–10.1)
CALCIUM SERPL-MCNC: 9 MG/DL (ref 8.3–10.1)
CHLORIDE SERPL-SCNC: 103 MMOL/L (ref 100–108)
CO2 SERPL-SCNC: 29 MMOL/L (ref 21–32)
CREAT SERPL-MCNC: 0.67 MG/DL (ref 0.6–1.3)
CROSSMATCH: NORMAL
ERYTHROCYTE [DISTWIDTH] IN BLOOD BY AUTOMATED COUNT: 23.5 % (ref 11.6–15.1)
GFR SERPL CREATININE-BSD FRML MDRD: 95 ML/MIN/1.73SQ M
GLUCOSE SERPL-MCNC: 90 MG/DL (ref 65–140)
HCT VFR BLD AUTO: 26.3 % (ref 34.8–46.1)
HCT VFR BLD AUTO: 27.5 % (ref 34.8–46.1)
HCT VFR BLD AUTO: 28 % (ref 34.8–46.1)
HGB BLD-MCNC: 7.2 G/DL (ref 11.5–15.4)
HGB BLD-MCNC: 7.6 G/DL (ref 11.5–15.4)
HGB BLD-MCNC: 7.8 G/DL (ref 11.5–15.4)
MAGNESIUM SERPL-MCNC: 2.4 MG/DL (ref 1.6–2.6)
MCH RBC QN AUTO: 17.6 PG (ref 26.8–34.3)
MCHC RBC AUTO-ENTMCNC: 27.6 G/DL (ref 31.4–37.4)
MCV RBC AUTO: 64 FL (ref 82–98)
PHOSPHATE SERPL-MCNC: 3.8 MG/DL (ref 2.3–4.1)
PLATELET # BLD AUTO: 296 THOUSANDS/UL (ref 149–390)
PMV BLD AUTO: 9 FL (ref 8.9–12.7)
POTASSIUM SERPL-SCNC: 3.2 MMOL/L (ref 3.5–5.3)
PROT SERPL-MCNC: 6.5 G/DL (ref 6.4–8.2)
RBC # BLD AUTO: 4.32 MILLION/UL (ref 3.81–5.12)
SODIUM SERPL-SCNC: 136 MMOL/L (ref 136–145)
UNIT DISPENSE STATUS: NORMAL
UNIT PRODUCT CODE: NORMAL
UNIT PRODUCT VOLUME: 350 ML
UNIT RH: NORMAL
WBC # BLD AUTO: 2.83 THOUSAND/UL (ref 4.31–10.16)

## 2022-04-07 PROCEDURE — 83735 ASSAY OF MAGNESIUM: CPT | Performed by: INTERNAL MEDICINE

## 2022-04-07 PROCEDURE — C9113 INJ PANTOPRAZOLE SODIUM, VIA: HCPCS | Performed by: STUDENT IN AN ORGANIZED HEALTH CARE EDUCATION/TRAINING PROGRAM

## 2022-04-07 PROCEDURE — 97163 PT EVAL HIGH COMPLEX 45 MIN: CPT

## 2022-04-07 PROCEDURE — 85027 COMPLETE CBC AUTOMATED: CPT | Performed by: INTERNAL MEDICINE

## 2022-04-07 PROCEDURE — 84100 ASSAY OF PHOSPHORUS: CPT | Performed by: INTERNAL MEDICINE

## 2022-04-07 PROCEDURE — 97166 OT EVAL MOD COMPLEX 45 MIN: CPT

## 2022-04-07 PROCEDURE — 85014 HEMATOCRIT: CPT | Performed by: INTERNAL MEDICINE

## 2022-04-07 PROCEDURE — C9113 INJ PANTOPRAZOLE SODIUM, VIA: HCPCS | Performed by: INTERNAL MEDICINE

## 2022-04-07 PROCEDURE — 80053 COMPREHEN METABOLIC PANEL: CPT | Performed by: INTERNAL MEDICINE

## 2022-04-07 PROCEDURE — 99232 SBSQ HOSP IP/OBS MODERATE 35: CPT | Performed by: INTERNAL MEDICINE

## 2022-04-07 PROCEDURE — 85018 HEMOGLOBIN: CPT | Performed by: INTERNAL MEDICINE

## 2022-04-07 PROCEDURE — 99223 1ST HOSP IP/OBS HIGH 75: CPT | Performed by: INTERNAL MEDICINE

## 2022-04-07 RX ORDER — POTASSIUM CHLORIDE 20 MEQ/1
40 TABLET, EXTENDED RELEASE ORAL ONCE
Status: COMPLETED | OUTPATIENT
Start: 2022-04-07 | End: 2022-04-07

## 2022-04-07 RX ORDER — PANTOPRAZOLE SODIUM 40 MG/1
40 INJECTION, POWDER, FOR SOLUTION INTRAVENOUS
Status: DISCONTINUED | OUTPATIENT
Start: 2022-04-07 | End: 2022-04-07

## 2022-04-07 RX ORDER — PANTOPRAZOLE SODIUM 40 MG/1
40 INJECTION, POWDER, FOR SOLUTION INTRAVENOUS EVERY 12 HOURS SCHEDULED
Status: DISCONTINUED | OUTPATIENT
Start: 2022-04-07 | End: 2022-04-08 | Stop reason: HOSPADM

## 2022-04-07 RX ADMIN — IRON SUCROSE 200 MG: 20 INJECTION, SOLUTION INTRAVENOUS at 11:35

## 2022-04-07 RX ADMIN — LEVOTHYROXINE SODIUM 25 MCG: 25 TABLET ORAL at 05:59

## 2022-04-07 RX ADMIN — PAROXETINE HYDROCHLORIDE 60 MG: 20 TABLET, FILM COATED ORAL at 09:29

## 2022-04-07 RX ADMIN — ZIPRASIDONE HYDROCHLORIDE 80 MG: 40 CAPSULE ORAL at 09:28

## 2022-04-07 RX ADMIN — PANTOPRAZOLE SODIUM 40 MG: 40 INJECTION, POWDER, FOR SOLUTION INTRAVENOUS at 20:00

## 2022-04-07 RX ADMIN — POTASSIUM CHLORIDE 40 MEQ: 1500 TABLET, EXTENDED RELEASE ORAL at 10:07

## 2022-04-07 RX ADMIN — QUETIAPINE FUMARATE 400 MG: 400 TABLET, EXTENDED RELEASE ORAL at 20:00

## 2022-04-07 RX ADMIN — PANTOPRAZOLE SODIUM 40 MG: 40 INJECTION, POWDER, FOR SOLUTION INTRAVENOUS at 09:28

## 2022-04-07 NOTE — PLAN OF CARE
Problem: PAIN - ADULT  Goal: Verbalizes/displays adequate comfort level or baseline comfort level  Description: Interventions:  - Encourage patient to monitor pain and request assistance  - Assess pain using appropriate pain scale  - Administer analgesics based on type and severity of pain and evaluate response  - Implement non-pharmacological measures as appropriate and evaluate response  - Consider cultural and social influences on pain and pain management  - Notify physician/advanced practitioner if interventions unsuccessful or patient reports new pain  Outcome: Progressing     Problem: INFECTION - ADULT  Goal: Absence or prevention of progression during hospitalization  Description: INTERVENTIONS:  - Assess and monitor for signs and symptoms of infection  - Monitor lab/diagnostic results  - Monitor all insertion sites, i e  indwelling lines, tubes, and drains  - Monitor endotracheal if appropriate and nasal secretions for changes in amount and color  - [place] appropriate cooling/warming therapies per order  - Administer medications as ordered  - Instruct and encourage patient and family to use good hand hygiene technique  - Identify and instruct in appropriate isolation precautions for identified infection/condition  Outcome: Progressing  Goal: Absence of fever/infection during neutropenic period  Description: INTERVENTIONS:  - Monitor WBC    Outcome: Progressing     Problem: Potential for Falls  Goal: Patient will remain free of falls  Description: INTERVENTIONS:  - Educate patient/family on patient safety including physical limitations  - Instruct patient to call for assistance with activity   - Consult OT/PT to assist with strengthening/mobility   - Keep Call bell within reach  - Keep bed low and locked with side rails adjusted as appropriate  - Keep care items and personal belongings within reach  - Initiate and maintain comfort rounds  - Make Fall Risk Sign visible to staff  - Offer Toileting every , in advance of need  - Initiate/Maintain   - Obtain necessary fall risk management equipment:   - Apply yellow socks and bracelet for high fall risk patients  - Consider moving patient to room near nurses station  Outcome: Progressing

## 2022-04-07 NOTE — PROGRESS NOTES
[address] [facility]  Progress Note - Cuauhtemoc Khanna 1960, 64 y o  female MRN: [id number]  Unit/Bed#: [facility] 906-01 Encounter: [id number]  Primary Care Provider: Sanam Arellano MD   Date and time admitted to hospital: 4/6/2022 10:32 AM      * Iron deficiency anemia due to acute on chronic blood loss  Assessment & Plan  Lab Results   Component Value Date    IRON 52 01/11/2022    TIBC 442 01/11/2022    FERRITIN 8 01/11/2022     Patient presents with 2 weeks of shortness of breath and vomiting  Reports vomiting was dark and assumed dark colon was has a result of chronic diet soda ingestion  Hemoglobin in the ED 5 6; shortness of breath likely secondary to symptomatic anemia in the setting of chronic iron deficiency anemia  Patient with recent iron panel findings as above; concerning for iron deficiency anemia with normal TIBC and low ferritin  On iron supplementation b i d  at home  Received 1 unit PRBCs in the ED; will trend and repeat PRBC infusions as needed  Likely secondary to upper versus lower GI bleed; consult to Gastroenterology  IV PPI; IV fluids considered, but will hold given grade 1 diastolic dysfunction at this time; 2 large-bore IVs; NPO    4/8:  EGD postponed until tomorrow  Okay for oral diet today but will be NPO after midnight  Hemoglobin improved to 7 8 after PRBC transfusion yesterday  Continue H/H monitoring  Continue IV PPI regimen  Initiated on a trial of IV Venofer  Appreciate gastroenterology input        Essential hypertension  Assessment & Plan  Continue Norvasc    Acquired hypothyroidism  Assessment & Plan  Continue Synthroid    Hiatal hernia  Assessment & Plan  Continue PPI course  Plan for endoscopy tomorrow    Psychiatric disorder  Assessment & Plan  Continue Paxil/Seroquel/Geodon regimen    Hypokalemia  Assessment & Plan  Monitor and replete serum potassium  Check serum magnesium level      DVT Prophylaxis:  SCDs       Patient Centered Rounds: I have performed bedside rounds and discussed plan of care with nursing today  Discussions with Specialists or Other Care Team Provider:  see above assessments if applicable    Education and Discussions with Family / Patient: Patient at bedside - requesting after EGD complete, mother, Jordy Pupa, be updated  Time Spent for Care:  32 minutes  More than 50% of total time spent on counseling and coordination of care as described above  Current Length of Stay: 1 day(s)    Current Patient Status: Inpatient   Certification Statement:  Patient will continue to require additional hospital stay due to assessments as noted above  Code Status: Level 1 - Full Code        Subjective:     No new complaints this time  States she feels less tired today  Objective:     Vitals:   Temp (24hrs), Av 3 °F (36 8 °C), Min:98 2 °F (36 8 °C), Max:98 5 °F (36 9 °C)    Temp:  [98 2 °F (36 8 °C)-98 5 °F (36 9 °C)] 98 2 °F (36 8 °C)  HR:  [] 91  Resp:  [17-18] 18  BP: (109-129)/(66-70) 116/66  SpO2:  [90 %-97 %] 97 %  There is no height or weight on file to calculate BMI  Input and Output Summary (last 24 hours):        Intake/Output Summary (Last 24 hours) at 2022 1626  Last data filed at 2022 0916  Gross per 24 hour   Intake --   Output 600 ml   Net -600 ml       Physical Exam:     GENERAL:  Improving weakness/fatigue  HEAD:  Normocephalic - atraumatic  EYES: PERRL - EOMI   MOUTH:  Mucosa moist  NECK:  Supple - full range of motion  CARDIAC:  Rate controlled - S1/S2 positive  PULMONARY:  Clear breath sounds bilaterally - nonlabored respirations  ABDOMEN:  Soft - nontender/nondistended - active bowel sounds  MUSCULOSKELETAL:  Motor strength/range of motion fairly intact  NEUROLOGIC:  Alert/oriented at baseline currently  SKIN:  Chronic wrinkles/blemishes   PSYCHIATRIC:  Mood/affect stable      Additional Data:     Labs & Recent Cultures:    Results from last 7 days   Lab Units 22  1332 22  2243 04/07/22  0459 04/06/22  1824 04/06/22  1046   WBC Thousand/uL  --   --  2 83*  --  4 51   HEMOGLOBIN g/dL 7 8*   < > 7 6*   < > 5 6*   HEMATOCRIT % 28 0*   < > 27 5*   < > 21 9*   PLATELETS Thousands/uL  --   --  296  --  306   NEUTROS PCT %  --   --   --   --  70   LYMPHS PCT %  --   --   --   --  12*   MONOS PCT %  --   --   --   --  12   EOS PCT %  --   --   --   --  5    < > = values in this interval not displayed  Results from last 7 days   Lab Units 04/07/22  0459   POTASSIUM mmol/L 3 2*   CHLORIDE mmol/L 103   CO2 mmol/L 29   BUN mg/dL 5   CREATININE mg/dL 0 67   CALCIUM mg/dL 9 0   ALK PHOS U/L 70   ALT U/L 24   AST U/L 28                                 Last 24 Hours Medication List:   Current Facility-Administered Medications   Medication Dose Route Frequency Provider Last Rate    acetaminophen  650 mg Oral Q6H PRN Hamp Fat, MD      amLODIPine  5 mg Oral Daily Hamp Fat, MD      hydrocortisone   Topical 4x Daily PRN Hamp Fat, MD      iron sucrose  200 mg Intravenous Daily Manuel Ramsey MD      levothyroxine  25 mcg Oral Early Morning Hamp Fat, MD      LORazepam  2 mg Oral Q6H PRN Hamp Fat, MD      ondansetron  4 mg Oral Q6H PRN Hamp Fat, MD      pantoprazole  40 mg Intravenous Q12H Stephen Vargas MD      PARoxetine  60 mg Oral Daily Hamp Fat, MD      QUEtiapine  400 mg Oral HS Hamp Fat, MD      ziprasidone  80 mg Oral Daily Hamp Fat, MD                      ** Please Note: This note is constructed using a voice recognition dictation system  An occasional wrong word/phrase or sound-a-like substitution may have been picked up by dictation device due to the inherent limitations of voice recognition software  Read the chart carefully and recognize, using reasonable context, where substitutions may have occurred  **

## 2022-04-07 NOTE — ASSESSMENT & PLAN NOTE
On presentation Hb-5 6, MCV-61  Arpit Moody Index-14 8 likely more Iron deficiency  Ferritin-8  Patient reports of Hematemesis ongoing for past 2 weeks- multiple episodes  Not on Iron Supplementation at home  noother blleding sites, endorses normal stools  Last EGD on January 10, 2022-LA class D esophagitis, Schreiber's esophagus, fundic gland polyps, normal stomach and duodenum  Biopsies did not identify any H pylori  Last colonoscopy in September 2020-5 mm transverse colon polyp removed by cold snare, no source of bleeding identified  Patient is scheduled for EGD today  On IV iron supplementation    Transfuse to keep Hb>7

## 2022-04-07 NOTE — CONSULTS
Edilberto [address] 1960, 64 y o  female MRN: [id number]  [facility] Encounter: [id number]  Primary Care Provider: Belia Magana MD   Date and time admitted to hospital: 4/6/2022 10:32 AM    Inpatient consult to gastroenterology  Consult performed by: Ca Leiva MD  Consult ordered by: Maci Pitts MD          Microcytic anemia  Assessment & Plan  On presentation Hb-5 6, MCV-61  Soham Pena Index-14 8 likely more Iron deficiency  Ferritin-8  Patient reports of Hematemesis ongoing for past 2 weeks- multiple episodes  Not on Iron Supplementation at home  noother blleding sites, endorses normal stools  Last EGD on January 10, 2022-LA class D esophagitis, Schreiber's esophagus, fundic gland polyps, normal stomach and duodenum  Biopsies did not identify any H pylori  Last colonoscopy in September 2020-5 mm transverse colon polyp removed by cold snare, no source of bleeding identified  Patient is scheduled for EGD today  On IV iron supplementation  Transfuse to keep Hb>7        Upper GI bleed  Assessment & Plan  Patient reports of Hematemesis-brown to black vomitus ongoing since past 2 weeks  Last EGD on January 10, 2022-LA class D esophagitis, Schreiber's esophagus, fundic gland polyps, normal stomach and duodenum  Biopsies did not identify any H pylori  Continue pantoprazole 40mg q12  Differentials include -peptic ulcer disease, Difleuy lesion  Plan for EGD tomorrow           History of Present Illness     HPI:   This is a 64years old female with past medical history of hypothyroidism, anxiety, hypertension, GERD, hyperlipidemia, iron deficiency anemia, schizoaffective disorder , history of hiatal hernia status post transoral fundoplication who presented to the ED with chief complaints of worsening shortness of breath and vomiting    Patient reports that she has been having black to brown vomitus since past 2 weeks but no reported Melena or blood in stools  She is not taking any iron supplements  On presentation hemoglobin-5 6, MCV of 61, ferritin of 8  Vitals stable  Fecal occult-Negative    Last EGD on January 10, 2022-LA class D esophagitis, Schreiber's esophagus, fundic gland polyps, normal stomach and duodenum  Biopsies did not identify any H pylori    Last colonoscopy in September 2020-5 mm transverse colon polyp removed by cold snare, no source of bleeding identified      Review of Systems    Historical Information   Past Medical History:   Diagnosis Date    Anxiety     Arthritis     Back pain at L4-L5 level     Schreiber esophagus     Bulimia     Colon polyp     Disease of thyroid gland     hypothyroid    Ear problems     GERD (gastroesophageal reflux disease)     History of transfusion     Hyperlipidemia     Hypothyroidism     Leukopenia     Nasal congestion     NMS (neuroleptic malignant syndrome) 01/03/2020    Pneumonia     Rheumatoid arthritis involving right hip (HCC)     Sciatica     Seizure ([address] )     due to medication mix up 8/2018 only one historically    Seizures ([address] )     Synovial cyst of lumbar spine     Vertigo     Weight gain      Past Surgical History:   Procedure Laterality Date    BONE MARROW BIOPSY      BREAST SURGERY      enlargement with implants    CLOSED REDUCTION DISTAL FEMUR FRACTURE      COLONOSCOPY      COSMETIC SURGERY      DENTAL SURGERY      HIP ARTHROPLASTY Right 1/2/2020    Procedure: REVISION TOTAL HIP ARTHROPLASTY WITH REPAIR OF PARIPROSTHETIC FRACTURE - POSTERIOR APPROACH;  Surgeon: Florentin Kelly MD;  [facility];  Service: Orthopedics    HI ESOPHAGOGASTRODUODENOSCOPY TRANSORAL DIAGNOSTIC N/A 5/11/2021    Procedure: ESOPHAGOGASTRODUODENOSCOPY (EGD); Surgeon:  Abdon Gerber MD;  [facility];  Service: General    HI ESOPHAGUS SURG PROCEDURE UNLISTED N/A 5/11/2021    Procedure: FUNDOPLICATION TRANSORAL INCISIONLESS;  Surgeon: Betty Conner MD;  [facility];  Service: Gastroenterology    MS LAP, REPAIR PARAESOPHAGEAL HERNIA, INCL FUNDOPLASTY W/ MESH N/A 5/11/2021    Procedure: REPAIR HERNIA PARAESOPHAGEAL  LAPAROSCOPIC;  Surgeon: Gautam Escalante MD;  [facility];  Service: General    MS TOTAL HIP ARTHROPLASTY Right 12/11/2019    Procedure: ARTHROPLASTY HIP TOTAL ANTERIOR;  Surgeon: Selene Marino MD;  [facility];  Service: Orthopedics    RHINOPLASTY      SINUS SURGERY      TRANSORAL INCISIONLESS FUNDOPLICATION (TIF)  13/43/0114     Social History   Social History     Substance and Sexual Activity   Alcohol Use Not Currently     Social History     Substance and Sexual Activity   Drug Use Not Currently     Social History     Tobacco Use   Smoking Status Never Smoker   Smokeless Tobacco Never Used     Family History: non-contributory    Meds/Allergies   all current active meds have been reviewed  Allergies   Allergen Reactions    Risperdal [Risperidone] Shortness Of Breath     Rapid heart beat, SOB    Zyprexa [Olanzapine] Shortness Of Breath     Rapid heartbeat    Latex Rash     Band aids, adhesives wears normal underwear, can eat bananas  USE PAPER TAPE       Objective   Vitals: Blood pressure 109/70, pulse 102, temperature 98 2 °F (36 8 °C), resp  rate 18, SpO2 94 %, not currently breastfeeding  Intake/Output Summary (Last 24 hours) at 4/7/2022 1357  Last data filed at 4/7/2022 0916  Gross per 24 hour   Intake 300 ml   Output 600 ml   Net -300 ml     Invasive Devices  Report    Peripheral Intravenous Line            Peripheral IV 04/06/22 Left Antecubital 1 day                Physical Exam    Lab Results: I have personally reviewed pertinent reports  Imaging Studies: I have personally reviewed pertinent reports  Code Status: Level 1 - Full Code  Advance Directive and Living Will:      Power of :    POLST:      Counseling / Coordination of Care  Total floor / unit time spent today 35 minutes   Greater than 50% of total time was spent with the patient and / or family counseling and / or coordination of care   A description of the counseling / coordination of care:

## 2022-04-07 NOTE — ASSESSMENT & PLAN NOTE
Lab Results   Component Value Date    IRON 52 01/11/2022    TIBC 442 01/11/2022    FERRITIN 8 01/11/2022     Patient presents with 2 weeks of shortness of breath and vomiting  Reports vomiting was dark and assumed dark colon was has a result of chronic diet soda ingestion  Hemoglobin in the ED 5 6; shortness of breath likely secondary to symptomatic anemia in the setting of chronic iron deficiency anemia  Patient with recent iron panel findings as above; concerning for iron deficiency anemia with normal TIBC and low ferritin  On iron supplementation b i d  at home  Received 1 unit PRBCs in the ED; will trend and repeat PRBC infusions as needed  Likely secondary to upper versus lower GI bleed; consult to Gastroenterology  IV PPI; IV fluids considered, but will hold given grade 1 diastolic dysfunction at this time; 2 large-bore IVs; NPO    4/8:  EGD postponed until tomorrow  Okay for oral diet today but will be NPO after midnight  Hemoglobin improved to 7 8 after PRBC transfusion yesterday  Continue H/H monitoring  Continue IV PPI regimen  Initiated on a trial of IV Venofer  Appreciate gastroenterology input

## 2022-04-07 NOTE — PHYSICAL THERAPY NOTE
Physical Therapy Evaluation    Patient's Name: Snehal Morris    Admitting Diagnosis  Fatigue [R53 83]  Dyspnea [R06 00]  SOB (shortness of breath) [R06 02]  Symptomatic anemia [D64 9]    Problem List  Patient Active Problem List   Diagnosis    Lumbar radiculopathy    DDD (degenerative disc disease), lumbar    Spondylolisthesis at L4-L5 level    Localized osteoporosis with current pathological fracture with routine healing    Spinal stenosis of lumbar region with neurogenic claudication    Lumbar spondylosis    T12 compression fracture (HCC)    Synovial cyst of lumbar facet joint    Anxiety    Bulimia nervosa in remission    Constipation    Acquired hypothyroidism    Other fatigue    Serotonin syndrome    Schizoaffective disorder (HCC)    ABIMAEL (generalized anxiety disorder)    Dermal hypersensitivity reaction    Elevated BP without diagnosis of hypertension    Essential hypertension    Right hip pain    Chronic bilateral low back pain without sciatica    Iron deficiency anemia due to chronic blood loss    NMS (neuroleptic malignant syndrome)    Hypokalemia    Fall at home    Gastroesophageal reflux disease with esophagitis without hemorrhage    Hyponatremia    Problem related to living arrangement    Dizziness    Medicare annual wellness visit, subsequent    Fever    Acute non-recurrent maxillary sinusitis    Symptomatic anemia    Multiple excoriations    Rash    Cellulitis of left upper extremity    Erosive esophagitis    Melena    Hiatal hernia with gastroesophageal reflux disease and esophagitis    Polyp of colon    Word finding difficulty    Hyperlipidemia    Nausea and vomiting    S/P tooth extraction    Upper GI bleed    Pruritus    Self neglect    Microcytic anemia       Past Medical History  Past Medical History:   Diagnosis Date    Anxiety     Arthritis     Back pain at L4-L5 level     Schreiber esophagus     Bulimia     Colon polyp     Disease of thyroid gland     hypothyroid    Ear problems     GERD (gastroesophageal reflux disease)     History of transfusion     Hyperlipidemia     Hypothyroidism     Leukopenia     Nasal congestion     NMS (neuroleptic malignant syndrome) 01/03/2020    Pneumonia     Rheumatoid arthritis involving right hip (HCC)     Sciatica     [address] [address]     due to medication mix up 8/2018 only one historically    Seizures ([facility] [address] [address]     Synovial cyst of lumbar spine     Vertigo     Weight gain        Past Surgical History  Past Surgical History:   Procedure Laterality Date    BONE MARROW BIOPSY      BREAST SURGERY      enlargement with implants    CLOSED REDUCTION DISTAL FEMUR FRACTURE      COLONOSCOPY      COSMETIC SURGERY      DENTAL SURGERY      HIP ARTHROPLASTY Right 1/2/2020    Procedure: REVISION TOTAL HIP ARTHROPLASTY WITH REPAIR OF PARIPROSTHETIC FRACTURE - POSTERIOR APPROACH;  Surgeon: Ilan Alegria MD;  [facility];  Service: Orthopedics    MI ESOPHAGOGASTRODUODENOSCOPY TRANSORAL DIAGNOSTIC N/A 5/11/2021    Procedure: ESOPHAGOGASTRODUODENOSCOPY (EGD); Surgeon: Patricia Mcgarry MD;  [facility];  Service: General    MI ESOPHAGUS SURG PROCEDURE UNLISTED N/A 5/11/2021    Procedure: FUNDOPLICATION TRANSORAL INCISIONLESS;  Surgeon: Quentin Ortiz MD;  [facility];  Service: Gastroenterology    MI LAP, REPAIR PARAESOPHAGEAL HERNIA, INCL FUNDOPLASTY W/ MESH N/A 5/11/2021    Procedure: REPAIR HERNIA PARAESOPHAGEAL  LAPAROSCOPIC;  Surgeon:  Patricia Mcgarry MD;  [facility];  Service: General    MI TOTAL HIP ARTHROPLASTY Right 12/11/2019    Procedure: ARTHROPLASTY HIP TOTAL ANTERIOR;  Surgeon: Ilan Alegria MD;  [facility];  Service: Orthopedics    RHINOPLASTY      SINUS SURGERY      TRANSORAL INCISIONLESS FUNDOPLICATION (TIF)  22/44/9850        04/07/22 1045   PT Last Visit   PT Visit Date 04/07/22   Note Type   Note type Evaluation   Pain Assessment   Pain Assessment Tool 0-10   Pain Score 3   Pain Location/Orientation Location: Generalized   Patient's Stated Pain Goal No pain   Hospital Pain Intervention(s) Ambulation/increased activity   Restrictions/Precautions   Weight Bearing Precautions Per Order No   Other Precautions Impulsive; Fall Risk   Home Living   Type of Home House   Additional Comments Resides w/ mother in 1 story home, 2 FAMILIA  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  Indep self care, ambulates w/ out device  Prior Function   Level of [place] Independent with ADLs and functional mobility   Falls in the last 6 months 0   Cognition   Overall Cognitive Status WFL   Arousal/Participation Responsive   Attention Within functional limits   Orientation Level Oriented X4   Memory Unable to assess   Following Commands Follows one step commands without difficulty   Subjective   Subjective states she feels less SOB  some fatigue post stairs  cooperative w/ eval   RLE Assessment   RLE Assessment   (strength grossly 4/5)   LLE Assessment   LLE Assessment   (strength grossly 4/5)   Coordination   Movements are Fluid and Coordinated 1   Bed Mobility   Supine to Sit 7  Independent   Additional items Increased time required   Transfers   Sit to Stand 7  Independent   Stand to Sit 7  Independent   Ambulation/Elevation   Gait pattern   (slow, mild ataxia)   Gait Assistance 5  Supervision   Additional items Assist x 1   Assistive Device None   Distance 130'x2 to and from stairs       Stair Management Assistance 5  Supervision   Additional items Assist x 1   Stair Management Technique One rail R;Nonreciprocal   Number of Stairs 7   Balance   Static Sitting Normal   Dynamic Sitting Good   Static Standing Good   Dynamic Standing Fair +   Ambulatory Fair +   Endurance Deficit   Endurance Deficit No   Activity Tolerance   Activity Tolerance Patient tolerated treatment well   Nurse Made Aware yes   Assessment   Prognosis Fair   Problem List Decreased strength;Decreased endurance; Impaired balance;Decreased mobility   Assessment Pt seen for physical therapy evaluation, portion of which performed as co-evaluation w/ OT due to concerns re: medical status  PT portion focused on mobility, where OT portion focused on ADLs, self care  Pt is a 65 y/o female w/ history/comorbidities of hypothyroidism, anxiety, HTN, GERD, HLD, anemia, schizoaffective d/o who is now admitted w/ SOB/VANG, voming dark material x 2 wks  Dx is iro deficiency anemia  Due to acute medical issues, need for hospital re-admit, pain, fall risk, note unstable clinical picture  PT consulted to assess mobility, d/c needs  At present time, despite acute medical issues, note pt to be functioning close to or at mobility baseline  No continued skilled acute care PT needs identified  will sign off  Pt may mobilize ad alex while here, and may d/c home when stable w/ no therapy neeeds       Goals   PT Treatment Day 0   Plan   PT Frequency   (d/c PT)   Recommendation   PT Discharge Recommendation No rehabilitation needs   AM-PAC Basic Mobility Inpatient   Turning in Bed Without Bedrails 4   Lying on Back to Sitting on Edge of Flat Bed 4   Moving Bed to Chair 4   Standing Up From Chair 4   Walk in Room 4   Climb 3-5 Stairs 3   Basic Mobility Inpatient Raw Score 23   Basic Mobility Standardized Score 50 88   Highest Level Of Mobility   JH-HLM Goal 7: Walk 25 feet or more   JH-HLM Highest Level of Mobility 7: Walk 25 feet or more   JH-HLM Goal Achieved Yes           Daniel Way PT, DPT, CSRS

## 2022-04-07 NOTE — UTILIZATION REVIEW
Initial Clinical Review    Admission: Date/Time/Statement:   Admission Orders (From admission, onward)     Ordered        04/06/22 1224  [address] [facility] [address] [place]  Once                      Orders Placed This Encounter   Procedures    INPATIENT ADMISSION     Standing Status:   Standing     Number of Occurrences:   1     Order Specific Question:   Level of Care     Answer:   Med Surg [16]     Order Specific Question:   Estimated length of stay     Answer:   More than 2 Midnights     Order Specific Question:   Certification     Answer:   I certify that inpatient services are medically necessary for this patient for a duration of greater than two midnights  See H&P and MD Progress Notes for additional information about the patient's course of treatment  ED Arrival Information     Expected Arrival Acuity    - 4/6/2022 10:18 Emergent         Means of arrival Escorted by Service Admission type    [facility] Emergency         Arrival complaint    sob        Chief Complaint   Patient presents with    Shortness of Breath     Pt reports SOBx2 weeks, worse with walking, sweating, "dark" vomiting; denies cp, denies diarrhea, denies abdo pain       Initial Presentation: 64 y o  female with PMHx of hypothyroidism, anxiety, HTN, GERD, HLD, iron def anemia, schizoaffective d/o presents to ED as a walk in with 2 wks of vomiting and sob  Reports the sob is with exertion after only walking a few steps  Reports vomiting was dark and assumed dark colon was has a result of chronic diet soda ingestion  In ED Hgb 5 6, Hct 21 9, Na 127, CL 93, Anion gap 3, Albumin 3 4  On exam pt currently with no respiratory distress  Excoriations on b/l shins  1 units PRBC's given in ED  ADMIT INPATIENT to M/S/TELE UNIT with IRON DEF ANEMIA D/T CHRONIC BLOOD LOSS  On iron supplementation at home, continue  Trend H/H, transfuse as needed  Likely 2/2 upper vs lower GI bleed  Consult GI  IV PPI  Hold IVFs d/t CHF  NPO for now   Continue levothyroxine  GI consult 4/7 : plan for EGD tomorrow  protonix BID, reflux precautions    Date: 4/7   Day 2:  Pt with no complaints today, states she feels less tired today  EGD postponed until tomorrow  Okay for oral diet today but will be NPO after midnight  Hemoglobin improved to 7 8 after PRBC transfusion yesterday  Continue H/H monitoring  Continue IV PPI regimen  Initiated on a trial of IV Venofer  Continue po meds  SCDs  4/8 -- EGD IMPRESSION:  · Grade D esophagitis with linear ulcers in the lower esophagus, biopsied for CMV esophagitis  · C1M3 Schreiber's esophagus with no associated nodule   Not biopsied due to severe esophagitis  · Status post hiatal hernia repair along with TIF, fundoplication wrap appeared intact  · Fundic gland polyps- previously biopsied so not re-biopsied  · The fundus of the stomach, body of the stomach and antrum appeared normal   · The duodenal bulb, 1st part of the duodenum and 2nd part of the duodenum appeared normal    RECOMMENDATION:  · Await pathology results  · PPI oral BID  · Carafate 1g TID  · Plan for repeat EGD as an outpatient        ED Triage Vitals   Temperature Pulse Respirations Blood Pressure SpO2   04/06/22 1040 04/06/22 1037 04/06/22 1037 04/06/22 1037 04/06/22 1037   98 4 °F (36 9 °C) 101 22 143/73 97 %      Temp Source Heart Rate Source Patient Position - Orthostatic VS BP Location FiO2 (%)   04/06/22 1040 04/06/22 1037 04/06/22 1037 04/06/22 1037 --   Oral Monitor Lying Right arm       Pain Score       04/06/22 1234       4          Wt Readings from Last 1 Encounters:   02/10/22 76 4 kg (168 lb 6 9 oz)     Additional Vital Signs:   Date/Time Temp Pulse Resp BP MAP (mmHg) SpO2 O2 Device   04/07/22 0929 -- -- -- -- -- -- None (Room air)   04/07/22 09:16:38 -- 102 -- 109/70 83 94 % --   04/07/22 06:25:14 98 2 °F (36 8 °C) 88 18 126/66 86 95 % --   04/07/22 00:05:30 98 5 °F (36 9 °C) 89 17 129/67 88 90 % --   04/06/22 2100 -- -- -- -- -- -- None (Room air)   04/06/22 1630 -- -- -- -- -- -- None (Room air)   04/06/22 1545 -- -- 18 -- -- -- --   04/06/22 15:31:10 98 4 °F (36 9 °C) 92 -- 139/71 94 97 % --   04/06/22 13:57:03 98 °F (36 7 °C) 92 -- 139/71 94 95 % --   04/06/22 1330 98 2 °F (36 8 °C) 90 18 130/71 95 95 % None (Room air)   04/06/22 1315 -- 92 18 128/64 90 96 % None (Room air)   04/06/22 1301 98 °F (36 7 °C) 92 20 123/61 -- -- --   04/06/22 1247 97 5 °F (36 4 °C) 95 24 Abnormal  127/64 -- -- --   04/06/22 1234 98 1 °F (36 7 °C) 91 22 143/69 -- 100 % None (Room air)   04/06/22 1130 -- 94 -- 122/56 80 95 % --   04/06/22 1050 -- -- -- -- -- -- None (Room air)   04/06/22 1040 98 4 °F (36 9 °C) -- -- -- -- -- --   04/06/22 1037 -- 101 22 143/73 -- 97 % None (Room air)       Pertinent Labs/Diagnostic Test Results:   XR chest 1 view portable   Final Result by Adelfo Sutherland MD (04/06 1154)      No acute cardiopulmonary disease               Results from last 7 days   Lab Units 04/06/22  1046   SARS-COV-2  Negative     Results from last 7 days   Lab Units 04/07/22  0459 04/06/22  1824 04/06/22  1046   WBC Thousand/uL 2 83*  --  4 51   HEMOGLOBIN g/dL 7 6* 7 3* 5 6*   HEMATOCRIT % 27 5* 26 5* 21 9*   PLATELETS Thousands/uL 296  --  306   NEUTROS ABS Thousands/µL  --   --  3 15     Results from last 7 days   Lab Units 04/07/22  0459 04/06/22  1046   SODIUM mmol/L 136 127*   POTASSIUM mmol/L 3 2* 3 7   CHLORIDE mmol/L 103 93*   CO2 mmol/L 29 31   ANION GAP mmol/L 4 3*   BUN mg/dL 5 6   CREATININE mg/dL 0 67 0 70   EGFR ml/min/1 73sq m 95 93   CALCIUM mg/dL 9 0 8 9   MAGNESIUM mg/dL 2 4  --    PHOSPHORUS mg/dL 3 8  --      Results from last 7 days   Lab Units 04/07/22  0459 04/06/22  1046   AST U/L 28 16   ALT U/L 24 25   ALK PHOS U/L 70 69   TOTAL PROTEIN g/dL 6 5 6 8   ALBUMIN g/dL 3 1* 3 4*   TOTAL BILIRUBIN mg/dL 0 44 0 32     Results from last 7 days   Lab Units 04/07/22  0459 04/06/22  1046   GLUCOSE RANDOM mg/dL 90 89     Results from last 7 days   Lab Units 04/06/22  1528 04/06/22  1309 04/06/22  1046   HS TNI 0HR ng/L  --   --  3   HS TNI 2HR ng/L  --  2  --    HSTNI D2 ng/L  --  -1  --    HS TNI 4HR ng/L 3  --   --    HSTNI D4 ng/L 0  --   --      Results from last 7 days   Lab Units 04/06/22  1046   D-DIMER QUANTITATIVE ug/ml FEU 0 67*     Results from last 7 days   Lab Units 04/06/22  1046   TSH 3RD GENERATON uIU/mL 1 290     Results from last 7 days   Lab Units 04/06/22  1524   CLARITY UA  Clear   COLOR UA  Colorless   SPEC GRAV UA  1 001*   PH UA  7 0   GLUCOSE UA mg/dl Negative   KETONES UA mg/dl Negative   BLOOD UA  Negative   PROTEIN UA mg/dl Negative   NITRITE UA  Negative   BILIRUBIN UA  Negative   UROBILINOGEN UA (BE) mg/dl <2 0   LEUKOCYTES UA  Negative     Results from last 7 days   Lab Units 04/06/22  1046   INFLUENZA A PCR  Negative   INFLUENZA B PCR  Negative   RSV PCR  Negative     ED Treatment:   Medication Administration from 04/06/2022 1017 to 04/06/2022 1347     None        Past Medical History:   Diagnosis Date    Anxiety     Arthritis     Back pain at L4-L5 level     Schreiber esophagus     Bulimia     Colon polyp     Disease of thyroid gland     hypothyroid    Ear problems     GERD (gastroesophageal reflux disease)     History of transfusion     Hyperlipidemia     Hypothyroidism     Leukopenia     Nasal congestion     NMS (neuroleptic malignant syndrome) 01/03/2020    Pneumonia     Rheumatoid arthritis involving right hip (HCC)     Sciatica     Seizure (HCC)     due to medication mix up 8/2018 only one historically    Seizures ([address] )     Synovial cyst of lumbar spine     Vertigo     Weight gain      Present on Admission:   Acquired hypothyroidism   Essential hypertension   ABIMAEL (generalized anxiety disorder)   Hiatal hernia with gastroesophageal reflux disease and esophagitis   Iron deficiency anemia due to chronic blood loss   Schizoaffective disorder (HCC)   Hyponatremia   Upper GI bleed      Admitting Diagnosis: Fatigue [R53 83]  Dyspnea [R06 00]  SOB (shortness of breath) [R06 02]  Symptomatic anemia [D64 9]  Age/Sex: 64 y o  female  Admission Orders:  Scheduled Medications:  amLODIPine, 5 mg, Oral, Daily  iron sucrose, 200 mg, Intravenous, Daily  levothyroxine, 25 mcg, Oral, Early Morning  pantoprazole, 40 mg, Intravenous, Q12H YUMIKO  PARoxetine, 60 mg, Oral, Daily  QUEtiapine, 400 mg, Oral, HS  ziprasidone, 80 mg, Oral, Daily     PRN Meds:  acetaminophen, 650 mg, Oral, Q6H PRN  hydrocortisone, , Topical, 4x Daily PRN  LORazepam, 2 mg, Oral, Q6H PRN  ondansetron, 4 mg, Oral, Q6H PRN        Network Utilization Review Department  ATTENTION: Please call with any questions or concerns to [phone] and carefully listen to the prompts so that you are directed to the right person  All voicemails are confidential   Clare [facility] all requests for admission clinical reviews, approved or denied determinations and any other requests to dedicated fax number below belonging to the campus where the patient is receiving treatment   List of dedicated fax numbers for the Facilities:  [address] DENIALS (Administrative/Medical Necessity) [phone]   [address] (Maternity/NICU/Pediatrics) [phone]   [address]  [address] [place] [address] [address] [place] [address] [address] [facility] [phone]

## 2022-04-07 NOTE — CASE MANAGEMENT
Case Management Assessment & Discharge Planning Note    Patient name Cuauhtemoc Khanna  Location [facility] 906/[facility] 812-37 MRN [id number]  : 1960 Date 2022       Current Admission Date: 2022  Current Admission Diagnosis:Iron deficiency anemia due to chronic blood loss   Patient Active Problem List    Diagnosis Date Noted    Microcytic anemia 2022    Self neglect 2022    Pruritus 2022    Upper GI bleed 01/10/2022    Nausea and vomiting 10/22/2021    S/P tooth extraction 10/22/2021    Hyperlipidemia 2021    Word finding difficulty 2021    Hiatal hernia with gastroesophageal reflux disease and esophagitis 2021    Polyp of colon 2021    Melena 2020    Cellulitis of left upper extremity 2020    Erosive esophagitis 2020    Rash 2020    Symptomatic anemia 2020    Multiple excoriations 2020    Acute non-recurrent maxillary sinusitis 2020    Fever 2020    Medicare annual wellness visit, subsequent 2020    Dizziness 2020    Fall at home 2020    Gastroesophageal reflux disease with esophagitis without hemorrhage 2020    Hyponatremia 2020    Problem related to living arrangement 2020    Hypokalemia     NMS (neuroleptic malignant syndrome) 2020    Iron deficiency anemia due to chronic blood loss 2019    Chronic bilateral low back pain without sciatica 2019    Right hip pain 07/15/2019    Essential hypertension 2019    Elevated BP without diagnosis of hypertension 2019    Dermal hypersensitivity reaction 2018    ABIMAEL (generalized anxiety disorder) 2018    Schizoaffective disorder ([address] ) 2018    Serotonin syndrome 2018    Other fatigue 2018    Spinal stenosis of lumbar region with neurogenic claudication 06/15/2018    Lumbar spondylosis 06/15/2018    T12 compression fracture ([address] ) 06/15/2018  Synovial cyst of lumbar facet joint 06/15/2018    Localized osteoporosis with current pathological fracture with routine healing 05/25/2018    Lumbar radiculopathy 03/19/2018    DDD (degenerative disc disease), lumbar 03/19/2018    Spondylolisthesis at L4-L5 level 03/19/2018    Anxiety 10/31/2016    Acquired hypothyroidism 10/31/2016    Constipation 04/10/2014    Bulimia nervosa in remission 03/19/2014      LOS (days): 1  Geometric Mean LOS (GMLOS) (days):   Days to GMLOS:     OBJECTIVE:    Risk of Unplanned Readmission Score: 31         Current admission status: Inpatient       Preferred Pharmacy:   [facility], [address] [place] [address] [place]  [address] [address]  Phone: [phone] Fax: [phone]    Primary Care Provider: Aysha Blanco MD    Primary Insurance: [address] [place], [place] [id number]  Secondary Insurance: [address] [place] [address]    ASSESSMENT:  [address], [address] [facility] Representative - Mother   Primary Phone: [phone] (Mobile)  Home Phone: [phone]                              Patient Information  Admitted from[de-identified] Home  Mental Status: Alert  During Assessment patient was accompanied by: Not accompanied during assessment  Assessment information provided by[de-identified] Patient  Primary Caregiver: Self  Support Systems: Self  What city do you live in?: [address] entry access options   Select all that apply : Stairs  Number of steps to enter home : 1  [facility]  In the last 12 months, was there a time when you were not able to pay the mortgage or rent on time?: No  In the last 12 months, how many places have you lived?: 1  In the last 12 months, was there a time when you did not have a steady place to sleep or slept in a shelter (including now)?: No  Living Arrangements:  (lives with mother (who pt cares for and was deemed incompetent on 2/11/22 per chart review))    Activities of Daily Living Prior to Admission  Functional Status: Independent  Completes ADLs independently?: Yes  Ambulates independently?: Yes  Does patient use assisted devices?: No  Does patient currently own DME?: Yes  What DME does the patient currently own?: [employer]  Does patient have a history of Outpatient Therapy (PT/OT)?: No  Does the patient have a history of Short-Term Rehab?: Yes ([address])  Does patient have a history of HHC?: Yes ([facility])  Does patient currently have [facility]?: No         Patient Information Continued  Income Source: SSI/SSD  Does patient have prescription coverage?: Yes  Within the past 12 months, you worried that your food would run out before you got the money to buy more : Never true  Within the past 12 months, the food you bought just didnt last and you didnt have money to get more : Never true  Food insecurity resource given?: No  Does patient receive dialysis treatments?: No  Does patient have a history of substance abuse?: No  Does patient have a history of Mental Health Diagnosis?: Yes (Schizoaffective Disorder and anxiety)  Is patient receiving treatment for mental health?: No  Patient declined treatment information    Has patient received inpatient treatment related to mental health in the last 2 years?: No (patient states that when she was in her 19's she was inpatient for mental health at [facility], in [facility], and [facility] - states "I went to them all")         Means of [facility] Insurance of Transport to [facility][de-identified] Drives Self  In the past 12 months, has lack of transportation kept you from medical appointments or from getting medications?: No  In the past 12 months, has lack of transportation kept you from meetings, work, or from getting things needed for daily living?: No        DISCHARGE DETAILS:    Discharge planning discussed with[de-identified] patient  Freedom of Choice: Yes     CM contacted family/caregiver?: No- see comments (mother is the emergency contact and she is incompetent)             Other Referral/Resources/Interventions Provided:  Referral Comments: awaiting therapy recommendations at this time          Additional Comments: per chart review - patients mothers name is Rochelle Perry (whom was deemed incompetent on 2/11/2022 and the patient is Ale Durbin' caregiver)  Patient Orlin Mcclain is well known to both [place] and HUGO Whitaker was a CM whom was assigned to the patients case from [facility] however case is closed and no longer active  The patient also has an assigned  - Lauren Roth another CM also assigned to patients case ([phone])  Chart review indicates that the patients mom receives the following services (meals on wheels, home health aides, and [facility] paid medical alter button)  The patients mother Ale Durbin has a  assigned Gregory RODRIGUEZ [phone] as well as an [facility]  - Juan Iverson [phone] regarding gaurdianship and other care concerns  CALLED CLOTILDE WRIGHT TODAY [phone] WHO CONFIRMED THAT BOTH THE PATIENT AND THE PATIENTS MOTHER DO NOT HAVE OPEN CASES AT THIS TIME  HOWEVER INFORMED AMINA THAT THE PATIENT THI IS CURRENTLY IN THE HOSPITAL AND PER THE PATIENT NOBODY IS CURRENTLY AT HOME CARING FOR HER MOTHER   CLOTILDE SPOKE WITH HER SUPERVISOR AND CONFIRMED THAT SOMEONE WILL GO OUT TO THE PATIENTS HOME TODAY AND WILL CHECK ON THE PATIENTS MOTHER TO ENSURE THAT SHE IS OKAY  CM reviewed d/c planning process including the following: identifying help at home, patient preference for d/c planning needs, Discharge [facility] [place] [facility], availability of treatment team to discuss questions or concerns patient and/or family may have regarding understanding medications and recognizing signs and symptoms once discharged  CM also encouraged patient to follow up with all recommended appointments after discharge  Patient advised of importance for patient and family to participate in managing patients medical well being  Patient/caregiver received discharge checklist  Content reviewed  Patient/caregiver encouraged to participate in discharge plan of care prior to discharge home

## 2022-04-07 NOTE — QUICK NOTE
On admission, patient noted to have excoriations that she attributed to fleas from her cats  On examination, no fleas observed; discussed with ID but states isolation has not required if no active please noted  Will place on contact isolation out of abundance of caution; hydrocortisone cream p r n  for pruritus

## 2022-04-07 NOTE — OCCUPATIONAL THERAPY NOTE
Occupational Therapy Evaluation     Patient Name: Dianna TAY Date: 4/7/2022  Problem List  Principal Problem:    Iron deficiency anemia due to chronic blood loss  Active Problems:    Acquired hypothyroidism    Schizoaffective disorder (HCC)    ABIMAEL (generalized anxiety disorder)    Essential hypertension    Hyponatremia    Hiatal hernia with gastroesophageal reflux disease and esophagitis    Upper GI bleed    Microcytic anemia    Past Medical History  Past Medical History:   Diagnosis Date    Anxiety     Arthritis     Back pain at L4-L5 level     Schreiber esophagus     Bulimia     Colon polyp     Disease of thyroid gland     hypothyroid    Ear problems     GERD (gastroesophageal reflux disease)     History of transfusion     Hyperlipidemia     Hypothyroidism     Leukopenia     Nasal congestion     NMS (neuroleptic malignant syndrome) 01/03/2020    Pneumonia     Rheumatoid arthritis involving right hip (HCC)     Sciatica     Seizure ([address] )     due to medication mix up 8/2018 only one historically    Seizures ([address] )     Synovial cyst of lumbar spine     Vertigo     Weight gain      Past Surgical History  Past Surgical History:   Procedure Laterality Date    BONE MARROW BIOPSY      BREAST SURGERY      enlargement with implants    CLOSED REDUCTION DISTAL FEMUR FRACTURE      COLONOSCOPY      COSMETIC SURGERY      DENTAL SURGERY      HIP ARTHROPLASTY Right 1/2/2020    Procedure: REVISION TOTAL HIP ARTHROPLASTY WITH REPAIR OF PARIPROSTHETIC FRACTURE - POSTERIOR APPROACH;  Surgeon: Junito Chapman MD;  [facility];  Service: Orthopedics    ME ESOPHAGOGASTRODUODENOSCOPY TRANSORAL DIAGNOSTIC N/A 5/11/2021    Procedure: ESOPHAGOGASTRODUODENOSCOPY (EGD); Surgeon:  Grecia Jaimes MD;  [facility];  Service: General    ME ESOPHAGUS SURG PROCEDURE UNLISTED N/A 5/11/2021    Procedure: FUNDOPLICATION TRANSORAL INCISIONLESS;  Surgeon: Juan Antonio Arreola MD;  [facility];  Service: Gastroenterology    MT LAP, REPAIR PARAESOPHAGEAL HERNIA, INCL FUNDOPLASTY W/ MESH N/A 5/11/2021    Procedure: REPAIR HERNIA PARAESOPHAGEAL  LAPAROSCOPIC;  Surgeon:  Gautam Escalante MD;  [facility];  Service: General    MT TOTAL HIP ARTHROPLASTY Right 12/11/2019    Procedure: ARTHROPLASTY HIP TOTAL ANTERIOR;  Surgeon: Selene Marino MD;  [facility];  Service: Orthopedics    RHINOPLASTY      SINUS SURGERY      TRANSORAL INCISIONLESS FUNDOPLICATION (TIF)  77/31/1906 04/07/22 1044   OT Last Visit   OT Visit Date 04/07/22   Note Type   Note type Evaluation   Restrictions/Precautions   Weight Bearing Precautions Per Order No   Other Precautions Fall Risk   Pain Assessment   Pain Assessment Tool 0-10   Pain Score No Pain   Home Living   Type of [address] One level  (2STE)   Bathroom Shower/Tub Tub/shower unit   Bathroom Toilet Raised   Bathroom Equipment Grab bars in [facility];Cane  (did not use PTA)   Additional Comments Pt reports having SC but does not fit in her shower   Prior Function   Level of [place] Independent with ADLs and functional mobility   Lives With Family  (mother)   Receives Help From Family   ADL Assistance Independent   IADLs Independent   Falls in the last 6 months 0   Vocational Retired   Lifestyle   Autonomy PTA, pt reports being I with ADLs/IADLs, fnxl mobility   Reciprocal Relationships Mother   Service to Others Retired professor [address] Her cats   Psychosocial   Psychosocial (WDL) WDL   ADL   Where Assessed Edge of bed   Eating Assistance 7  Independent   Grooming Assistance 7  [facility] 7  [facility] 5  [place]  [address]  7  [address]  [address]  5  Supervision/Setup   Bed Mobility   Supine to Sit 6  Modified independent   Additional items HOB elevated   Sit to Supine Unable to assess   Transfers   Sit to Stand 7  Independent   Stand to Sit 7  Independent   Functional Mobility   Functional Mobility 5  Supervision   Additional Comments No AD   Balance   Static Sitting Good   Dynamic Sitting Fair +   Static Standing Fair   Dynamic Standing Fair   Ambulatory Fair   Activity Tolerance   Activity Tolerance Patient tolerated treatment well   Medical Staff Made Aware PT Moshe   Nurse Made Aware RN clearance for session    RUE Assessment   RUE Assessment WFL   LUE Assessment   LUE Assessment WFL   Hand Function   Gross Motor Coordination Functional   Fine Motor Coordination Functional   Sensation   Light Touch No apparent deficits   Cognition   Overall Cognitive Status WFL   Arousal/Participation Alert; Responsive; Cooperative   Attention Within functional limits   Orientation Level Oriented X4   Memory Within functional limits   Following Commands Follows one step commands without difficulty   Comments Pt pleasant and cooperative t/o session    Assessment   Assessment Pt is a 64 y o  female admitted to [facility] on 4/6/2022 w/ Iron deficiency anemia due to chronic blood loss, SOB  has a past medical history of Anxiety, Arthritis, Back pain, Schreiber esophagus, Bulimia, Colon polyp, Disease of thyroid gland, Ear problems, GERD, Hyperlipidemia, Hypothyroidism, Leukopenia,  Schizoaffective disorder, T12 fx (2018), Nasal congestion, NMS (neuroleptic malignant syndrome), Pneumonia, Rheumatoid arthritis, Sciatica,, Seizures ([facility] [address] ), Synovial cyst of lumbar spine, Vertigo  Pt with active OT orders and up and OOB as tolerated orders  Pt seen as a co-evaluation with PT due to the patient's co-morbidities, clinically unstable presentation/clinical complexity, and present impairments  As per pt report, pta, resides with her mother in a 1STH, 2STE  Pt was I w/  ADLS and IADLS  Upon evaluation, pt currently requires MI/I for transfers and S for mobility   Pt currently requires I eating, I grooming, I UB ADLs, S LB ADLs, and S toileting  Educated pt on energy conservation techniques, as well as benefits of SC  From OT standpoint, recommendation would be home with increased social support  No further acute OT needs  D/C OT  Please re-consult if needed  Thank you  Pt was left after session with all current needs met  The patient's raw score on the AM-PAC Daily Activity inpatient short form is 21, standardized score is 44 27, greater than 39 4  Patients at this level are likely to benefit from discharge to home  Please refer to the recommendation of the Occupational Therapist for safe discharge planning     Plan   OT Frequency Eval only   Recommendation   OT Discharge Recommendation No rehabilitation needs  (home with increased social support)   Equipment Recommended Shower/Tub chair with back ($)   OT - OK to Discharge Yes  (when medically stable)   AM-PAC Daily Activity Inpatient   Lower Body Dressing 3   Bathing 3   Toileting 3   Upper Body Dressing 4   Grooming 4   Eating 4   Daily Activity Raw Score 21   Daily Activity Standardized Score (Calc for Raw Score >=11) 44 27   AM-PAC Applied Cognition Inpatient   Following a Speech/Presentation 4   Understanding Ordinary Conversation 4   Taking Medications 4   Remembering Where Things Are Placed or Put Away 4   Remembering List of 4-5 Errands 4   Taking Care of Complicated Tasks 3   Applied Cognition Raw Score 23   Applied Cognition Standardized Score 53 08       Tyler Snow MS, OTR/L

## 2022-04-07 NOTE — PLAN OF CARE
Problem: PAIN - ADULT  Goal: Verbalizes/displays adequate comfort level or baseline comfort level  Description: Interventions:  - Encourage patient to monitor pain and request assistance  - Assess pain using appropriate pain scale  - Administer analgesics based on type and severity of pain and evaluate response  - Implement non-pharmacological measures as appropriate and evaluate response  - Consider cultural and social influences on pain and pain management  - Notify physician/advanced practitioner if interventions unsuccessful or patient reports new pain  Outcome: Progressing     Problem: INFECTION - ADULT  Goal: Absence or prevention of progression during hospitalization  Description: INTERVENTIONS:  - Assess and monitor for signs and symptoms of infection  - Monitor lab/diagnostic results  - Monitor all insertion sites, i e  indwelling lines, tubes, and drains  - Monitor endotracheal if appropriate and nasal secretions for changes in amount and color  - [place] appropriate cooling/warming therapies per order  - Administer medications as ordered  - Instruct and encourage patient and family to use good hand hygiene technique  - Identify and instruct in appropriate isolation precautions for identified infection/condition  Outcome: Progressing  Goal: Absence of fever/infection during neutropenic period  Description: INTERVENTIONS:  - Monitor WBC    Outcome: Progressing

## 2022-04-08 ENCOUNTER — ANESTHESIA (INPATIENT)
Dept: GASTROENTEROLOGY | Facility: HOSPITAL | Age: 62
DRG: 378 | End: 2022-04-08
Payer: MEDICARE

## 2022-04-08 ENCOUNTER — APPOINTMENT (INPATIENT)
Dept: GASTROENTEROLOGY | Facility: HOSPITAL | Age: 62
DRG: 378 | End: 2022-04-08
Payer: MEDICARE

## 2022-04-08 ENCOUNTER — ANESTHESIA EVENT (INPATIENT)
Dept: GASTROENTEROLOGY | Facility: HOSPITAL | Age: 62
DRG: 378 | End: 2022-04-08
Payer: MEDICARE

## 2022-04-08 VITALS
TEMPERATURE: 97 F | HEART RATE: 89 BPM | RESPIRATION RATE: 16 BRPM | SYSTOLIC BLOOD PRESSURE: 98 MMHG | OXYGEN SATURATION: 97 % | DIASTOLIC BLOOD PRESSURE: 56 MMHG

## 2022-04-08 PROBLEM — K20.90 ESOPHAGITIS: Status: ACTIVE | Noted: 2020-01-27

## 2022-04-08 PROBLEM — K22.70 BARRETT'S ESOPHAGUS: Status: ACTIVE | Noted: 2022-04-08

## 2022-04-08 PROBLEM — E87.1 HYPONATREMIA: Status: RESOLVED | Noted: 2020-01-27 | Resolved: 2022-04-08

## 2022-04-08 LAB
ANION GAP SERPL CALCULATED.3IONS-SCNC: 6 MMOL/L (ref 4–13)
BUN SERPL-MCNC: 6 MG/DL (ref 5–25)
CALCIUM SERPL-MCNC: 8.8 MG/DL (ref 8.3–10.1)
CHLORIDE SERPL-SCNC: 105 MMOL/L (ref 100–108)
CO2 SERPL-SCNC: 27 MMOL/L (ref 21–32)
CREAT SERPL-MCNC: 0.61 MG/DL (ref 0.6–1.3)
GFR SERPL CREATININE-BSD FRML MDRD: 98 ML/MIN/1.73SQ M
GLUCOSE SERPL-MCNC: 91 MG/DL (ref 65–140)
HCT VFR BLD AUTO: 27.3 % (ref 34.8–46.1)
HCT VFR BLD AUTO: 28.5 % (ref 34.8–46.1)
HGB BLD-MCNC: 7.4 G/DL (ref 11.5–15.4)
HGB BLD-MCNC: 7.7 G/DL (ref 11.5–15.4)
MAGNESIUM SERPL-MCNC: 2.1 MG/DL (ref 1.6–2.6)
POTASSIUM SERPL-SCNC: 3.8 MMOL/L (ref 3.5–5.3)
SODIUM SERPL-SCNC: 138 MMOL/L (ref 136–145)

## 2022-04-08 PROCEDURE — 88312 SPECIAL STAINS GROUP 1: CPT | Performed by: SPECIALIST

## 2022-04-08 PROCEDURE — 88341 IMHCHEM/IMCYTCHM EA ADD ANTB: CPT | Performed by: SPECIALIST

## 2022-04-08 PROCEDURE — 83735 ASSAY OF MAGNESIUM: CPT | Performed by: INTERNAL MEDICINE

## 2022-04-08 PROCEDURE — 87252 VIRUS INOCULATION TISSUE: CPT | Performed by: INTERNAL MEDICINE

## 2022-04-08 PROCEDURE — 85014 HEMATOCRIT: CPT | Performed by: INTERNAL MEDICINE

## 2022-04-08 PROCEDURE — 87254 VIRUS INOCULATION SHELL VIA: CPT | Performed by: INTERNAL MEDICINE

## 2022-04-08 PROCEDURE — 0DB38ZX EXCISION OF LOWER ESOPHAGUS, VIA NATURAL OR ARTIFICIAL OPENING ENDOSCOPIC, DIAGNOSTIC: ICD-10-PCS | Performed by: INTERNAL MEDICINE

## 2022-04-08 PROCEDURE — 85018 HEMOGLOBIN: CPT | Performed by: INTERNAL MEDICINE

## 2022-04-08 PROCEDURE — 99239 HOSP IP/OBS DSCHRG MGMT >30: CPT | Performed by: NURSE PRACTITIONER

## 2022-04-08 PROCEDURE — 43239 EGD BIOPSY SINGLE/MULTIPLE: CPT | Performed by: INTERNAL MEDICINE

## 2022-04-08 PROCEDURE — 88342 IMHCHEM/IMCYTCHM 1ST ANTB: CPT | Performed by: SPECIALIST

## 2022-04-08 PROCEDURE — C9113 INJ PANTOPRAZOLE SODIUM, VIA: HCPCS | Performed by: INTERNAL MEDICINE

## 2022-04-08 PROCEDURE — 88305 TISSUE EXAM BY PATHOLOGIST: CPT | Performed by: SPECIALIST

## 2022-04-08 PROCEDURE — 80048 BASIC METABOLIC PNL TOTAL CA: CPT | Performed by: INTERNAL MEDICINE

## 2022-04-08 RX ORDER — SUCRALFATE 1 G/1
1 TABLET ORAL
Qty: 90 TABLET | Refills: 0 | Status: SHIPPED | OUTPATIENT
Start: 2022-04-08 | End: 2022-05-27 | Stop reason: SDUPTHER

## 2022-04-08 RX ORDER — SUCRALFATE 1 G/1
1 TABLET ORAL
Status: DISCONTINUED | OUTPATIENT
Start: 2022-04-08 | End: 2022-04-08 | Stop reason: HOSPADM

## 2022-04-08 RX ORDER — LIDOCAINE HYDROCHLORIDE 10 MG/ML
INJECTION, SOLUTION EPIDURAL; INFILTRATION; INTRACAUDAL; PERINEURAL AS NEEDED
Status: DISCONTINUED | OUTPATIENT
Start: 2022-04-08 | End: 2022-04-08

## 2022-04-08 RX ORDER — PANTOPRAZOLE SODIUM 40 MG/1
40 TABLET, DELAYED RELEASE ORAL 2 TIMES DAILY
Qty: 60 TABLET | Refills: 0 | Status: SHIPPED | OUTPATIENT
Start: 2022-04-08 | End: 2022-07-20

## 2022-04-08 RX ORDER — OMEPRAZOLE 40 MG/1
40 CAPSULE, DELAYED RELEASE ORAL 2 TIMES DAILY
Qty: 90 CAPSULE | Refills: 0 | Status: SHIPPED | OUTPATIENT
Start: 2022-04-08 | End: 2022-04-08 | Stop reason: HOSPADM

## 2022-04-08 RX ORDER — FENTANYL CITRATE 50 UG/ML
INJECTION, SOLUTION INTRAMUSCULAR; INTRAVENOUS AS NEEDED
Status: DISCONTINUED | OUTPATIENT
Start: 2022-04-08 | End: 2022-04-08

## 2022-04-08 RX ORDER — SODIUM CHLORIDE 9 MG/ML
INJECTION, SOLUTION INTRAVENOUS CONTINUOUS PRN
Status: DISCONTINUED | OUTPATIENT
Start: 2022-04-08 | End: 2022-04-08

## 2022-04-08 RX ORDER — PROPOFOL 10 MG/ML
INJECTION, EMULSION INTRAVENOUS AS NEEDED
Status: DISCONTINUED | OUTPATIENT
Start: 2022-04-08 | End: 2022-04-08

## 2022-04-08 RX ADMIN — PAROXETINE HYDROCHLORIDE 60 MG: 20 TABLET, FILM COATED ORAL at 09:12

## 2022-04-08 RX ADMIN — PROPOFOL 50 MG: 10 INJECTION, EMULSION INTRAVENOUS at 12:34

## 2022-04-08 RX ADMIN — IRON SUCROSE 200 MG: 20 INJECTION, SOLUTION INTRAVENOUS at 09:14

## 2022-04-08 RX ADMIN — PROPOFOL 50 MG: 10 INJECTION, EMULSION INTRAVENOUS at 12:30

## 2022-04-08 RX ADMIN — SODIUM CHLORIDE: 0.9 INJECTION, SOLUTION INTRAVENOUS at 12:12

## 2022-04-08 RX ADMIN — PROPOFOL 100 MG: 10 INJECTION, EMULSION INTRAVENOUS at 12:22

## 2022-04-08 RX ADMIN — LIDOCAINE HYDROCHLORIDE 100 MG: 10 INJECTION, SOLUTION EPIDURAL; INFILTRATION; INTRACAUDAL; PERINEURAL at 12:22

## 2022-04-08 RX ADMIN — PROPOFOL 50 MG: 10 INJECTION, EMULSION INTRAVENOUS at 12:26

## 2022-04-08 RX ADMIN — ZIPRASIDONE HYDROCHLORIDE 80 MG: 40 CAPSULE ORAL at 09:12

## 2022-04-08 RX ADMIN — PANTOPRAZOLE SODIUM 40 MG: 40 INJECTION, POWDER, FOR SOLUTION INTRAVENOUS at 09:12

## 2022-04-08 RX ADMIN — LEVOTHYROXINE SODIUM 25 MCG: 25 TABLET ORAL at 05:22

## 2022-04-08 RX ADMIN — FENTANYL CITRATE 100 MCG: 50 INJECTION INTRAMUSCULAR; INTRAVENOUS at 12:15

## 2022-04-08 NOTE — ANESTHESIA POSTPROCEDURE EVALUATION
Post-Op Assessment Note    CV Status:  Stable    Pain management: adequate     Mental Status:  Awake and lethargic   Hydration Status:  Stable   PONV Controlled:  None   Airway Patency:  Patent      Post Op Vitals Reviewed: Yes      Staff: CRNA         No complications documented      BP 99/60 (04/08/22 1240)    Temp (!) 97 °F (36 1 °C) (04/08/22 1240)    Pulse 90 (04/08/22 1240)   Resp 18 (04/08/22 1240)    SpO2 94 % (04/08/22 1240)

## 2022-04-08 NOTE — ASSESSMENT & PLAN NOTE
Continue home medications; no acute issues  Patient with history of NMS and serotonin syndrome on record; avoid medication changes

## 2022-04-08 NOTE — DISCHARGE SUMMARY
[address] [facility]  Discharge- Echo Greer 1960, 64 y o  female MRN: [id number]  Unit/Bed#: [facility] 906-01 Encounter: [id number]  Primary Care Provider: Latrell Emerson MD   Date and time admitted to hospital: 4/6/2022 10:32 AM    * Iron deficiency anemia due to acute on chronic blood loss  Assessment & Plan  Lab Results   Component Value Date    IRON 52 01/11/2022    TIBC 442 01/11/2022    FERRITIN 8 01/11/2022     · presented with 2 weeks of shortness of breath and vomiting  · Hemoglobin in the ED 5 6; shortness of breath likely secondary to symptomatic anemia in the setting of chronic iron deficiency anemia  · Patient with recent iron panel findings as above; concerning for iron deficiency anemia with normal TIBC and low ferritin  · On iron supplementation b i d  at home  · Received 1 unit PRBCs in the ED   · Anemia secondary to severe esophagitis found on EGD today   · S/p 2 doses of IV venofer    Esophagitis  Assessment & Plan  · EGD today showing severe esophagitis   · Biopsies pending at discharge   · Plan for repeat EGD outpatient   · Start PPI BID and carafate TID     Schreiber's esophagus  Assessment & Plan  · Noted on EGD 4/8  · Biopsies noted obtained due to severe esophagitis, will need repeat EGD after discharge   · C/w PPI BID     Hiatal hernia  Assessment & Plan  Continue PPI BID    Essential hypertension  Assessment & Plan  Continue Norvasc 5mg daily     Psychiatric disorder  Assessment & Plan  Continue Paxil/Seroquel/Geodon regimen    Schizoaffective disorder ([facility] [address] )  Assessment & Plan  Continue home medications; no acute issues  Patient with history of NMS and serotonin syndrome on record; avoid medication changes      Acquired hypothyroidism  Assessment & Plan  Continue Synthroid    Hypokalemia-resolved as of 4/8/2022  Assessment & Plan  Monitor and replete serum potassium  Check serum magnesium level    Medical Problems             Resolved Problems  Date Reviewed: 4/8/2022          Resolved    Hypokalemia 4/8/2022     Resolved by  Yolande Mcburney, CRNP    Hyponatremia 4/8/2022     Resolved by  Yolande Mcburney, [address]              Discharging Physician / Practitioner: Yolande Mcburney, CRNP  PCP: Janet Patrick MD  Admission Date:   Admission Orders (From admission, onward)     Ordered        04/06/22 1224  [facility] [facility]                      Discharge Date: 04/08/22    Consultations During Hospital Stay:  · GI     Procedures Performed:   · EGD 4/8- Grade D esophagitis with linear ulcers in the lower esophagus, biopsied for CMV esophagitis  C1M3 Schreiber's esophagus with no associated nodule  Not biopsied due to severe esophagitis  Status post hiatal hernia repair along with TIF, fundoplication wrap appeared intact  Fundic gland polyps- previously biopsied so not re-biopsied  The fundus of the stomach, body of the stomach and antrum appeared normal  The duodenal bulb, 1st part of the duodenum and 2nd part of the duodenum appeared normal   · 4/6 chest xray- No acute cardiopulmonary disease  · 4/6 1 unit of PRBCs   · IV venofer 4/7 and 4/8    Significant Findings / Test Results:   · Severe esophagitis and barretts esophagus on EGD     Incidental Findings:   · None      Test Results Pending at Discharge (will require follow up):   · EGD biopsy results      Outpatient Tests Requested:  · Cbc 5-7 days after discharge     Complications:  None     Reason for Admission: Shortness of breath/dyspnea on exertion    Hospital Course:   Olga Arciniega is a 64 y o  female patient  with past medical history of hypothyroidism, anxiety, hypertension, GERD, hyperlipidemia, iron deficiency anemia, schizoaffective disorder  who originally presented to the hospital on 4/6/2022 due to 2 week history of vomiting and SOB  The patient was found to have a hgb of 5 6 in the ER and given one unit of PRBCs  She was admitted and seen by GI   She received 2 doses of IV venofer and her hgb remained stable at 7 7 on the day of discharge  She had an EGD performed on 4/8 which showed severe esophagitis and barretts esophagus  She will have biopsies pending at discharge and will need outpatient f/u with GI for repeat EGD outpatient  She will be discharged on protonix 40mg PO BID and carafate TID  Please see above list of diagnoses and related plan for additional information  Condition at Discharge: good    Discharge Day Visit / Exam:   Subjective:  Pt denies any complaints  Seen after EGD and tolerated diet without difficulty  Vitals: Blood Pressure: 98/56 (04/08/22 1255)  Pulse: 89 (04/08/22 1255)  Temperature: (!) 97 °F (36 1 °C) (04/08/22 1240)  Temp Source: Tympanic (04/08/22 1240)  Respirations: 16 (04/08/22 1255)  SpO2: 97 % (04/08/22 1255)  Exam:   Physical Exam  Vitals and nursing note reviewed  Constitutional:       General: She is not in acute distress  Appearance: She is well-developed  HENT:      Head: Normocephalic and atraumatic  Cardiovascular:      Rate and Rhythm: Normal rate and regular rhythm  Heart sounds: Normal heart sounds  No murmur heard  Pulmonary:      Effort: Pulmonary effort is normal  No respiratory distress  Breath sounds: Normal breath sounds  No wheezing or rales  Abdominal:      General: Bowel sounds are normal  There is no distension  Palpations: Abdomen is soft  Tenderness: There is abdominal tenderness (epigastric )  Musculoskeletal:         General: No swelling or tenderness  Skin:     General: Skin is warm and dry  Neurological:      General: No focal deficit present  Mental Status: She is alert  Mental status is at baseline  Psychiatric:         Mood and Affect: Mood normal           Discussion with Family: Updated  (mother) via phone  Discharge instructions/Information to patient and family:   See after visit summary for information provided to patient and family        Provisions for Follow-Up Care:  See after visit summary for information related to follow-up care and any pertinent home health orders  Disposition:   Home    Planned Readmission: no     Discharge Statement:  I spent 45 minutes discharging the patient  This time was spent on the day of discharge  I had direct contact with the patient on the day of discharge  Greater than 50% of the total time was spent examining patient, answering all patient questions, arranging and discussing plan of care with patient as well as directly providing post-discharge instructions  Additional time then spent on discharge activities  Discharge Medications:  See after visit summary for reconciled discharge medications provided to patient and/or family        **Please Note: This note may have been constructed using a voice recognition system**

## 2022-04-08 NOTE — DISCHARGE INSTRUCTIONS
Upper Endoscopy   WHAT YOU NEED TO KNOW:   An upper endoscopy is also called an upper gastrointestinal (GI) endoscopy, or an esophagogastroduodenoscopy (EGD)  It is a procedure to examine the inside of your esophagus, stomach, and duodenum (first part of the small intestine) with a scope  You may feel bloated, gassy, or have some abdominal discomfort after your procedure  Your throat may be sore for 24 to 36 hours  You may burp or pass gas from air that is still inside your body  DISCHARGE INSTRUCTIONS:   Seek care immediately if:    You have sudden, severe abdominal pain   You have problems swallowing   You have a large amount of black, sticky bowel movements or blood in your bowel movements   You have sudden trouble breathing   You feel weak, lightheaded, or faint or your heart beats faster than normal for you  Contact your healthcare provider if:    You have a fever and chills   You have nausea or are vomiting   Your abdomen is bloated or feels full and hard   You have abdominal pain   You have black, sticky bowel movements or blood in your bowel movements   You have not had a bowel movement for 3 days after your procedure   You have rash or hives   You have questions or concerns about your procedure  Activity:    Do not lift, strain, or run for 24 hours after your procedure   Rest after your procedure  You have been given medicine to relax you  Do not drive or make important decisions until the day after your procedure  Return to your normal activity as directed   Relieve gas and discomfort from bloating by lying on your right side with a heating pad on your abdomen  You may need to take short walks to help the gas move out  Eat small meals until bloating is relieved  Follow up with your healthcare provider as directed: Write down your questions so you remember to ask them during your visits       If you take a blood thinner, please review the specific instructions from your endoscopist about when you should resume it  These can be found in the Recommendation and Your Medication list sections of this After Visit Summary  Anemia   WHAT YOU NEED TO KNOW:   Anemia is a low number of red blood cells or a low amount of hemoglobin in your red blood cells  Hemoglobin is a protein that helps carry oxygen throughout your body  Red blood cells use iron to create hemoglobin  Anemia may develop if your body does not have enough iron  It may also develop if your body does not make enough red blood cells or they die faster than your body can make them  DISCHARGE INSTRUCTIONS:   Call your local emergency number (516 in the ), or have someone call if:   · You lose consciousness  · You have severe chest pain  Return to the emergency department if:   · You have dark or bloody bowel movements  Call your doctor if:   · Your symptoms are worse, even after treatment  · You have questions or concerns about your condition or care  Medicines:   · Iron or folic acid supplements  help increase your red blood cell and hemoglobin levels  · Vitamin B12 injections  may help boost your red blood cell level and decrease your symptoms  Ask your healthcare provider how to inject B12  · Take your medicine as directed  Contact your healthcare provider if you think your medicine is not helping or if you have side effects  Tell him of her if you are allergic to any medicine  Keep a list of the medicines, vitamins, and herbs you take  Include the amounts, and when and why you take them  Bring the list or the pill bottles to follow-up visits  Carry your medicine list with you in case of an emergency  Prevent anemia:  Eat healthy foods rich in iron and vitamin C  Nuts, meat, dark leafy green vegetables, and beans are high in iron and protein  Vitamin C helps your body absorb iron  Foods rich in vitamin C include oranges and other citrus fruits  Ask your healthcare provider for a list of other foods that are high in iron or vitamin C  Ask if you need to be on a special diet  Follow up with your doctor as directed:  Write down your questions so you remember to ask them during your visits  © Copyright [employer] 2022 Information is for End User's use only and may not be sold, redistributed or otherwise used for commercial purposes  All illustrations and images included in CareNotes® are the copyrighted property of A D A M , Inc  or Dorene Diaz   The above information is an  only  It is not intended as medical advice for individual conditions or treatments  Talk to your doctor, nurse or pharmacist before following any medical regimen to see if it is safe and effective for you  Esophagitis   WHAT YOU NEED TO KNOW:   Esophagitis is inflammation or irritation of the lining of the esophagus          DISCHARGE INSTRUCTIONS:   Call your local emergency number (911 in the [facility]) for any of the following:   · You have any of the following signs of a heart attack:      ? Squeezing, pressure, or pain in your chest     ? You may also have any of the following:      § Discomfort or pain in your back, neck, jaw, stomach, or arm     § Shortness of breath     § Nausea or vomiting     § Lightheadedness or a sudden cold sweat        Return to the emergency department if:   · You feel like you have food stuck in your throat and you cannot cough it out         Call your doctor if:   · You have new or worsening symptoms, even after treatment      · You have questions or concerns about your condition or care      Medicines:   · Medicines may be given to fight an infection or to control stomach acid      · Take your medicine as directed  Contact your healthcare provider if you think your medicine is not helping or if you have side effects  Tell him of her if you are allergic to any medicine   Keep a list of the medicines, vitamins, and herbs you take  Include the amounts, and when and why you take them  Bring the list or the pill bottles to follow-up visits  Carry your medicine list with you in case of an emergency      Manage or prevent esophagitis:   · Do not smoke  Nicotine and other chemicals in cigarettes and cigars can irritate and damage your esophagus  Ask your healthcare provider for information if you currently smoke and need help to quit  E-cigarettes or smokeless tobacco still contain nicotine  Talk to your healthcare provider before you use these products       · Do not drink alcohol  Alcohol can irritate your esophagus  Talk to your healthcare provider if you need help to stop drinking      · Limit or do not eat foods that can lead to esophagitis  Foods such as oranges and salsa can irritate your esophagus  Caffeine and chocolate can cause acid reflux  High-fat and fried foods make your stomach digest food more slowly  This increases the amount of stomach acid your esophagus is exposed to  Eat small meals, and drink water with your meals  Soft foods such as yogurt and applesauce may help soothe your throat  Do not eat for at least 3 hours before you go to bed      · Drink more liquid when you take pills  Drink a full glass of water when you take your pills  Ask your healthcare provider if you can take your pills at least an hour before you go to bed      · Prevent acid reflux  Do not bend over unless it is necessary  Acid may back up into your esophagus when you bend over  If possible, keep the head of your bed elevated while you sleep  This will help keep acid from backing up  Manage stress  Stress can make your symptoms worse or cause stomach acid to back up      · Keep batteries and similar objects out of the reach of children  Babies often put items in their mouths to explore them  Button batteries are easy to swallow and can cause serious damage  Keep the battery covers of electronic devices such as remote controls taped closed   Store all batteries and toxic materials where children cannot get to them  Use childproof locks to keep children away from dangerous materials      Follow up with your doctor as directed: Your doctor may refer you to a stomach specialist, allergist, or dietitian  You may need ongoing tests or treatment  Write down your questions so you remember to ask them during your visits  © Copyright [address] 2022 Information is for End User's use only and may not be sold, redistributed or otherwise used for commercial purposes  All illustrations and images included in CareNotes® are the copyrighted property of A D A M , Inc  or [address] [place] [address]  The above information is an  only  It is not intended as medical advice for individual conditions or treatments  Talk to your doctor, nurse or pharmacist before following any medical regimen to see if it is safe and effective for you        Schreiber Esophagus   WHAT YOU NEED TO KNOW:   Schreiber esophagus is a condition that is also called intestinal metaplasia  Cells that line your esophagus change into cells that are like intestine cells  The change increases your risk for esophageal cancer  DISCHARGE INSTRUCTIONS:   Call your local emergency number (911 in the [address] [place] [address],[address]) if:   · You have severe chest pain and shortness of breath  Return to the emergency department if:   · Your bowel movements are black, bloody, or tarry  · Your vomit looks like coffee grounds or has blood in it  Call your doctor or gastroenterologist if:   · Your symptoms do not improve with treatment  · You have questions or concerns about your condition or care  Medicines:   · Anti-reflux medicines  may be needed to help decrease the stomach acid that can irritate your esophagus and stomach  These medicines may include proton pump inhibitors (PPI) and histamine type-2 receptor (H2) blockers  You may also be given medicines to stop vomiting  · Take your medicine as directed    Contact your healthcare provider if you think your medicine is not helping or if you have side effects  Tell him or her if you are allergic to any medicine  Keep a list of the medicines, vitamins, and herbs you take  Include the amounts, and when and why you take them  Bring the list or the pill bottles to follow-up visits  Carry your medicine list with you in case of an emergency  Nutrition:  Do not eat foods that make your symptoms worse  Examples are chocolate, garlic, onions, spicy or fatty foods, citrus fruits (oranges), and tomato-based foods (spaghetti sauce)  Do not drink alcohol, drinks that contain caffeine, or carbonated drinks, such as soda  Ask your healthcare provider if there are other foods and drinks you should not have  Maintain a healthy weight:  If you are overweight, weight loss may help relieve symptoms  Ask your healthcare provider about safe ways to lose weight  Do not smoke: If you smoke, it is never too late to quit  Smoking may worsen acid reflux  Ask your healthcare provider for information if you need help quitting  For support and more information:   · [address]  [place]    [facility]  Phone: [phone]  Web Address: http://[employer]/  org    Follow up with your doctor or gastroenterologist as directed:  He or she may need to repeat your endoscopy and biopsy  These tests help look for early signs of esophageal cancer  Write down your questions so you remember to ask them during your visits  © Copyright [employer] 2022 Information is for End User's use only and may not be sold, redistributed or otherwise used for commercial purposes  All illustrations and images included in CareNotes® are the copyrighted property of [employer]  or Dorene Darden  The above information is an  only  It is not intended as medical advice for individual conditions or treatments   Talk to your doctor, nurse or pharmacist before following any medical regimen to see if it is safe and effective for you

## 2022-04-08 NOTE — ASSESSMENT & PLAN NOTE
· Noted on EGD 4/8  · Biopsies noted obtained due to severe esophagitis, will need repeat EGD after discharge   · C/w PPI BID

## 2022-04-08 NOTE — ASSESSMENT & PLAN NOTE
· EGD today showing severe esophagitis   · Biopsies pending at discharge   · Plan for repeat EGD outpatient   · Start PPI BID and carafate TID

## 2022-04-08 NOTE — PLAN OF CARE
Problem: PAIN - ADULT  Goal: Verbalizes/displays adequate comfort level or baseline comfort level  Description: Interventions:  - Encourage patient to monitor pain and request assistance  - Assess pain using appropriate pain scale  - Administer analgesics based on type and severity of pain and evaluate response  - Implement non-pharmacological measures as appropriate and evaluate response  - Consider cultural and social influences on pain and pain management  - Notify physician/advanced practitioner if interventions unsuccessful or patient reports new pain  Outcome: Progressing     Problem: INFECTION - ADULT  Goal: Absence or prevention of progression during hospitalization  Description: INTERVENTIONS:  - Assess and monitor for signs and symptoms of infection  - Monitor lab/diagnostic results  - Monitor all insertion sites, i e  indwelling lines, tubes, and drains  - Monitor endotracheal if appropriate and nasal secretions for changes in amount and color  - [place] appropriate cooling/warming therapies per order  - Administer medications as ordered  - Instruct and encourage patient and family to use good hand hygiene technique  - Identify and instruct in appropriate isolation precautions for identified infection/condition  Outcome: Progressing  Goal: Absence of fever/infection during neutropenic period  Description: INTERVENTIONS:  - Monitor WBC    Outcome: Progressing     Problem: Potential for Falls  Goal: Patient will remain free of falls  Description: INTERVENTIONS:  - Educate patient/family on patient safety including physical limitations  - Instruct patient to call for assistance with activity   - Consult OT/PT to assist with strengthening/mobility   - Keep Call bell within reach  - Keep bed low and locked with side rails adjusted as appropriate  - Keep care items and personal belongings within reach  - Initiate and maintain comfort rounds  - Make Fall Risk Sign visible to staff  - Offer Toileting every , in advance of need  - Initiate/Maintain   - Obtain necessary fall risk management equipment:   - Apply yellow socks and bracelet for high fall risk patients  - Consider moving patient to room near nurses station  Outcome: Progressing

## 2022-04-08 NOTE — ASSESSMENT & PLAN NOTE
Lab Results   Component Value Date    IRON 52 01/11/2022    TIBC 442 01/11/2022    FERRITIN 8 01/11/2022     · presented with 2 weeks of shortness of breath and vomiting  · Hemoglobin in the ED 5 6; shortness of breath likely secondary to symptomatic anemia in the setting of chronic iron deficiency anemia  · Patient with recent iron panel findings as above; concerning for iron deficiency anemia with normal TIBC and low ferritin  · On iron supplementation b i d  at home  · Received 1 unit PRBCs in the ED   · Anemia secondary to severe esophagitis found on EGD today   · S/p 2 doses of IV venofer

## 2022-04-08 NOTE — CASE MANAGEMENT
Case Management Discharge Planning Note    Patient name Palak Smith  [facility] MRN [id number]  : 1960 Date 2022       Current Admission Date: 2022  Current Admission Diagnosis:Iron deficiency anemia due to acute on chronic blood loss   Patient Active Problem List    Diagnosis Date Noted    Schreiber's esophagus 2022    Microcytic anemia 2022    Self neglect 2022    Pruritus 2022    Upper GI bleed 01/10/2022    Nausea and vomiting 10/22/2021    S/P tooth extraction 10/22/2021    Hyperlipidemia 2021    Word finding difficulty 2021    Hiatal hernia 2021    Polyp of colon 2021    Melena 2020    Cellulitis of left upper extremity 2020    Erosive esophagitis 2020    Rash 2020    Symptomatic anemia 2020    Multiple excoriations 2020    Acute non-recurrent maxillary sinusitis 2020    Fever 2020    Medicare annual wellness visit, subsequent 2020    Dizziness 2020    Fall at home 2020    Esophagitis 2020    Problem related to living arrangement 2020    NMS (neuroleptic malignant syndrome) 2020    Iron deficiency anemia due to acute on chronic blood loss 2019    Chronic bilateral low back pain without sciatica 2019    Right hip pain 07/15/2019    Essential hypertension 2019    Elevated BP without diagnosis of hypertension 2019    Dermal hypersensitivity reaction 2018    Psychiatric disorder 2018    Schizoaffective disorder ([address] ) 2018    Serotonin syndrome 2018    Other fatigue 2018    Spinal stenosis of lumbar region with neurogenic claudication 06/15/2018    Lumbar spondylosis 06/15/2018    T12 compression fracture ([address] ) 06/15/2018    Synovial cyst of lumbar facet joint 06/15/2018    Localized osteoporosis with current pathological fracture with routine healing 05/25/2018    Lumbar radiculopathy 03/19/2018    DDD (degenerative disc disease), lumbar 03/19/2018    Spondylolisthesis at L4-L5 level 03/19/2018    Anxiety 10/31/2016    Acquired hypothyroidism 10/31/2016    Constipation 04/10/2014    Bulimia nervosa in remission 03/19/2014      LOS (days): 2  Geometric Mean LOS (GMLOS) (days): 3 00  Days to GMLOS:0 9     OBJECTIVE:  Risk of Unplanned Readmission Score: 32         Current admission status: Inpatient   Preferred Pharmacy:   [facility] [address] [place] [address] [address]  Phone: [phone] Fax: [phone]    Primary Care Provider: Glenys Aguirre MD    Primary Insurance: [address] [facility]  Secondary Insurance: [id number] DETAILS:                                 Additional Comments: patient is currently written for discharge  patient with no CM needs for time of discharge

## 2022-04-08 NOTE — ANESTHESIA PREPROCEDURE EVALUATION
Procedure:  EGD    Relevant Problems   CARDIO   (+) Essential hypertension   (+) Hyperlipidemia      ENDO   (+) Acquired hypothyroidism      GI/HEPATIC   (+) Erosive esophagitis   (+) Gastroesophageal reflux disease with esophagitis without hemorrhage   (+) Hiatal hernia   (+) Upper GI bleed      HEMATOLOGY   (+) Iron deficiency anemia due to acute on chronic blood loss   (+) Microcytic anemia   (+) Symptomatic anemia      MUSCULOSKELETAL   (+) Chronic bilateral low back pain without sciatica   (+) DDD (degenerative disc disease), lumbar   (+) Lumbar spondylosis      NEURO/PSYCH   (+) Anxiety   (+) Chronic bilateral low back pain without sciatica   (+) Word finding difficulty    Hgb 5 6 on admission, last 7 4 4/8/22  Pt denies N/V/ abdominal pain    Physical Exam    Airway    Mallampati score: III  TM Distance: >3 FB  Neck ROM: full     Dental       Cardiovascular  Cardiovascular exam normal    Pulmonary  Pulmonary exam normal     Other Findings        Anesthesia Plan  ASA Score- 3     Anesthesia Type- IV sedation with anesthesia with ASA Monitors  Additional Monitors:   Airway Plan:           Plan Factors-Exercise tolerance (METS): >4 METS  Chart reviewed  Patient summary reviewed  Induction- intravenous  Postoperative Plan-     Informed Consent- Anesthetic plan and risks discussed with patient  I personally reviewed this patient with the CRNA  Discussed and agreed on the Anesthesia Plan with the CRNA  Marcos Marie

## 2022-04-08 NOTE — PLAN OF CARE
Problem: INFECTION - ADULT  Goal: Absence or prevention of progression during hospitalization  Description: INTERVENTIONS:  - Assess and monitor for signs and symptoms of infection  - Monitor lab/diagnostic results  - Monitor all insertion sites, i e  indwelling lines, tubes, and drains  - Monitor endotracheal if appropriate and nasal secretions for changes in amount and color  - [place] appropriate cooling/warming therapies per order  - Administer medications as ordered  - Instruct and encourage patient and family to use good hand hygiene technique  - Identify and instruct in appropriate isolation precautions for identified infection/condition  Outcome: Progressing

## 2022-04-11 ENCOUNTER — TELEPHONE (OUTPATIENT)
Dept: GASTROENTEROLOGY | Facility: CLINIC | Age: 62
End: 2022-04-11

## 2022-04-11 ENCOUNTER — TRANSITIONAL CARE MANAGEMENT (OUTPATIENT)
Dept: INTERNAL MEDICINE CLINIC | Facility: CLINIC | Age: 62
End: 2022-04-11

## 2022-04-11 NOTE — UTILIZATION REVIEW
Notification of Discharge   This is a Notification of Discharge from our facility [address]  Please be advised that this patient has been discharge from our facility  Below you will find the admission and discharge date and time including the patients disposition  UTILIZATION REVIEW CONTACT:  Osman Dean  Utilization   Network Utilization Review Department  Phone: [phone] x carefully listen to the prompts  All voicemails are confidential   Email: [email or web address]     PHYSICIAN ADVISORY SERVICES:  FOR [id number] REVIEW - MEDICAL NECESSITY DENIAL  Phone: [phone]  Fax: [phone]  Email: [email or web address]     PRESENTATION DATE: 4/6/2022 10:32 AM  OBERVATION ADMISSION DATE:   INPATIENT ADMISSION DATE: 4/6/22 12:24 PM   DISCHARGE DATE: 4/8/2022  4:30 PM  DISPOSITION: Home/Self Care Home/Self Care      IMPORTANT INFORMATION:  Send all requests for admission clinical reviews, approved or denied determinations and any other requests to dedicated fax number below belonging to the campus where the patient is receiving treatment   List of dedicated fax numbers:  [address] DENIALS (Administrative/Medical Necessity) [phone]   1000  [facility] (Maternity/NICU/Pediatrics) [phone]   [facility] [phone]   [address] [facility] [phone]   [address] [place] [address] [phone]   [address] [phone]   [facility]  [phone]   [address] [place] [address], [facility]  [address] [facility] 1000 [facility] [phone]

## 2022-04-11 NOTE — TELEPHONE ENCOUNTER
Scheduled date of EGD(as of today): 5/31/2022  Physician performing EGD: Dr Moreland  Location of EGD: [facility]  Instructions reviewed with patient by: Doris/david  Clearances: N/A

## 2022-04-11 NOTE — TELEPHONE ENCOUNTER
----- Message from Omar Carbone MD sent at 4/8/2022 12:41 PM EDT -----  Please arrange repeat EGD in 6-8 weeks for esophageal ulcer  Thanks       Omar Carbone MD  Gastroenterology Fellow

## 2022-04-14 LAB — CMV SPEC QL CULT: NORMAL

## 2022-04-15 ENCOUNTER — PATIENT OUTREACH (OUTPATIENT)
Dept: INTERNAL MEDICINE CLINIC | Facility: CLINIC | Age: 62
End: 2022-04-15

## 2022-04-15 ENCOUNTER — OFFICE VISIT (OUTPATIENT)
Dept: INTERNAL MEDICINE CLINIC | Facility: CLINIC | Age: 62
End: 2022-04-15
Payer: MEDICARE

## 2022-04-15 VITALS
SYSTOLIC BLOOD PRESSURE: 160 MMHG | DIASTOLIC BLOOD PRESSURE: 100 MMHG | BODY MASS INDEX: 30.9 KG/M2 | HEART RATE: 91 BPM | WEIGHT: 181 LBS | HEIGHT: 64 IN | OXYGEN SATURATION: 99 %

## 2022-04-15 DIAGNOSIS — K22.10 EROSIVE ESOPHAGITIS: ICD-10-CM

## 2022-04-15 DIAGNOSIS — K92.2 UPPER GI BLEED: Primary | ICD-10-CM

## 2022-04-15 DIAGNOSIS — I10 ESSENTIAL HYPERTENSION: ICD-10-CM

## 2022-04-15 DIAGNOSIS — Z11.4 SCREENING FOR HIV (HUMAN IMMUNODEFICIENCY VIRUS): ICD-10-CM

## 2022-04-15 DIAGNOSIS — Z12.4 SCREENING FOR CERVICAL CANCER: ICD-10-CM

## 2022-04-15 DIAGNOSIS — Z59.9 PROBLEM RELATED TO LIVING ARRANGEMENT: ICD-10-CM

## 2022-04-15 DIAGNOSIS — E03.9 ACQUIRED HYPOTHYROIDISM: ICD-10-CM

## 2022-04-15 DIAGNOSIS — K22.719 BARRETT'S ESOPHAGUS WITH DYSPLASIA: ICD-10-CM

## 2022-04-15 DIAGNOSIS — F25.1 SCHIZOAFFECTIVE DISORDER, DEPRESSIVE TYPE (HCC): ICD-10-CM

## 2022-04-15 DIAGNOSIS — Z12.31 ENCOUNTER FOR SCREENING MAMMOGRAM FOR BREAST CANCER: ICD-10-CM

## 2022-04-15 PROCEDURE — 99495 TRANSJ CARE MGMT MOD F2F 14D: CPT | Performed by: INTERNAL MEDICINE

## 2022-04-15 SDOH — ECONOMIC STABILITY - INCOME SECURITY: PROBLEM RELATED TO HOUSING AND ECONOMIC CIRCUMSTANCES, UNSPECIFIED: Z59.9

## 2022-04-15 NOTE — ASSESSMENT & PLAN NOTE
Pt reports she can't dana living with her mother anymore  She reports she is on a waiting list for a subsidized apartment

## 2022-04-15 NOTE — PROGRESS NOTES
Assessment/Plan:    Problem related to living arrangement  Pt reports she can't dana living with her mother anymore  She reports she is on a waiting list for a subsidized apartment  Erosive esophagitis  Continue pantoprazole 40 mg twice a day and follow-up GI    Schreiber's esophagus  Noted on EGD April 8, 2022, follow-up GI for surveillance    Acquired hypothyroidism  Continue levothyroxine along with monitoring    Schizoaffective disorder ([facility] )  Follow up psychology, I also recommend follow up psychiatry    Essential hypertension  Patient on amlodipine 5 mg daily, she is under lot of stress which can also make her blood pressure go up  If blood pressure not coming down, will consider increasing med or adding in additional med    Upper GI bleed  Will recheck CBC, follow-up GI, continue pantoprazole       Diagnoses and all orders for this visit:    Upper GI bleed  -     CBC and differential; Future  -     Comprehensive metabolic panel;  Future    Screening for HIV (human immunodeficiency virus)  -     HIV 1/2 Antigen/Antibody (4th Generation) w Reflex [facility]; Future    Screening for cervical cancer    Encounter for screening mammogram for breast cancer  -     Mammo screening bilateral w 3d & cad; Future    Problem related to living arrangement    Erosive esophagitis    Schreiber's esophagus with dysplasia    Acquired hypothyroidism    Schizoaffective disorder, depressive type ([facility] )    Essential hypertension        TCM Call (since 3/15/2022)     Date and time call was made  4/12/2022 10:14 AM    Hospital care reviewed  Records not available        Patient was hospitialized at  [facility]        Date of Admission  04/06/22    Date of discharge  04/08/22    Diagnosis  Iron Deficiency anemia due to acute on chronic blood loss    Disposition  Home    Were the patients medications reviewed and updated  No    Current Symptoms  Fatigue    Fatigue severity  Severe      TCM Call (since 3/15/2022)     Should patient be enrolled in anticoag monitoring? No    Scheduled for follow up? Yes    Did you obtain your prescribed medications  No    I have advised the patient to call PCP with any new or worsening symptoms  LISA ZIEGLER MR/RMA    Are you recieving any outpatient services  No    Are you recieving home care services  No    Are you using any community resources  No    Current waiver services  No    Have you fallen in the last 12 months  Yes    How many times  3-4 TIMES A DAY    Interperter language line needed  No          Subjective:      Patient ID: Rob Urbina is a 64 y o  female  I reviewed patient's hospitalization for anemia, EGD was done which showed severe esophagitis and Schreiber's esophagus, she was also transfused  Since home, she is not having heartburn, but she has vomited about once a day  No black tarry stool  No coffee-ground emesis  She is taking the proton pump inhibitor      The following portions of the patient's history were reviewed and updated as appropriate: allergies, current medications, past family history, past medical history, past social history, past surgical history and problem list     Review of Systems   Constitutional: Positive for fatigue  Negative for chills and fever  HENT: Negative for congestion, nosebleeds, postnasal drip, sore throat and trouble swallowing  Eyes: Negative for pain  Respiratory: Positive for shortness of breath (with exertion)  Negative for cough, chest tightness and wheezing  Cardiovascular: Negative for chest pain, palpitations and leg swelling  Gastrointestinal: Positive for vomiting  Negative for abdominal pain, constipation, diarrhea and nausea  Endocrine: Negative for polydipsia and polyuria  Genitourinary: Negative for dysuria, flank pain and hematuria  Musculoskeletal: Negative for arthralgias  Skin: Negative for rash  Neurological: Negative for dizziness, tremors, light-headedness and headaches     Hematological: Does not bruise/bleed easily  Psychiatric/Behavioral: Positive for dysphoric mood  Negative for confusion  The patient is nervous/anxious  Objective:      /100 (BP Location: Left arm, Patient Position: Sitting, Cuff Size: Standard)   Pulse 91   Ht 5' 3 5" (1 613 m)   Wt 82 1 kg (181 lb)   SpO2 99%   BMI 31 56 kg/m²          Physical Exam  Vitals reviewed  Constitutional:       General: She is not in acute distress  Appearance: She is well-developed  HENT:      Head: Normocephalic and atraumatic  Right Ear: External ear normal       Left Ear: External ear normal    Eyes:      General: No scleral icterus  Conjunctiva/sclera: Conjunctivae normal    Neck:      Thyroid: No thyromegaly  Trachea: No tracheal deviation  Cardiovascular:      Rate and Rhythm: Normal rate and regular rhythm  Heart sounds: Normal heart sounds  No murmur heard  Pulmonary:      Effort: Pulmonary effort is normal  No respiratory distress  Breath sounds: Normal breath sounds  No wheezing or rales  Abdominal:      General: Bowel sounds are normal       Palpations: Abdomen is soft  Tenderness: There is no abdominal tenderness  There is no guarding or rebound  Musculoskeletal:      Cervical back: Normal range of motion and neck supple  Right lower leg: No edema  Left lower leg: No edema  Lymphadenopathy:      Cervical: No cervical adenopathy  Skin:     Coloration: Skin is not jaundiced  Neurological:      Mental Status: She is alert and oriented to person, place, and time
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
## 2022-04-15 NOTE — ASSESSMENT & PLAN NOTE
Patient on amlodipine 5 mg daily, she is under lot of stress which can also make her blood pressure go up    If blood pressure not coming down, will consider increasing med or adding in additional med

## 2022-04-15 NOTE — PATIENT INSTRUCTIONS
Problem List Items Addressed This Visit        Digestive    Erosive esophagitis     Continue pantoprazole 40 mg twice a day and follow-up GI         Upper GI bleed - Primary     Will recheck CBC, follow-up GI, continue pantoprazole         Relevant Orders    CBC and differential    Comprehensive metabolic panel    Schreiber's esophagus     Noted on EGD April 8, 2022, follow-up GI for surveillance            Endocrine    Acquired hypothyroidism     Continue levothyroxine along with monitoring            Cardiovascular and Mediastinum    Essential hypertension     Patient on amlodipine 5 mg daily, she is under lot of stress which can also make her blood pressure go up  If blood pressure not coming down, will consider increasing med or adding in additional med            Other    Schizoaffective disorder ([facility] )     Follow up psychology, I also recommend follow up psychiatry         Problem related to living arrangement     Pt reports she can't dana living with her mother anymore  She reports she is on a waiting list for a subsidized apartment             Other Visit Diagnoses     Screening for HIV (human immunodeficiency virus)        Relevant Orders    HIV 1/2 Antigen/Antibody (4th Generation) w Reflex [facility]    Screening for cervical cancer        Encounter for screening mammogram for breast cancer        Relevant Orders    Mammo screening bilateral w 3d & cad

## 2022-04-24 DIAGNOSIS — F41.9 ANXIETY: ICD-10-CM

## 2022-04-25 RX ORDER — LORAZEPAM 2 MG/1
TABLET ORAL
Qty: 120 TABLET | Refills: 1 | Status: SHIPPED | OUTPATIENT
Start: 2022-04-25 | End: 2022-07-21 | Stop reason: SDUPTHER

## 2022-04-27 ENCOUNTER — TELEPHONE (OUTPATIENT)
Dept: INTERNAL MEDICINE CLINIC | Facility: CLINIC | Age: 62
End: 2022-04-27

## 2022-04-27 NOTE — TELEPHONE ENCOUNTER
Pt calling in asking if she can have [facility] services to apply her rash cream to her back every day    states her mother used to do it for her but she is admitted to the hospital and will be for a while - if VNA can not do it wants to know if she can come in the office every day and have someone here do it?

## 2022-04-27 NOTE — TELEPHONE ENCOUNTER
[employer] or drug [employer] have devices for this, they look kind of like a long spatula, some of them also can be used as a massager    She can search how to apply lotion to your back by herself on [employer] to see the devices

## 2022-04-28 DIAGNOSIS — R11.0 NAUSEA: ICD-10-CM

## 2022-04-29 RX ORDER — ONDANSETRON 4 MG/1
4 TABLET, FILM COATED ORAL EVERY 8 HOURS PRN
Qty: 20 TABLET | Refills: 5 | Status: SHIPPED | OUTPATIENT
Start: 2022-04-29

## 2022-05-27 DIAGNOSIS — K22.10 EROSIVE ESOPHAGITIS: ICD-10-CM

## 2022-05-31 ENCOUNTER — ANESTHESIA (OUTPATIENT)
Dept: GASTROENTEROLOGY | Facility: HOSPITAL | Age: 62
End: 2022-05-31

## 2022-05-31 ENCOUNTER — ANESTHESIA EVENT (OUTPATIENT)
Dept: GASTROENTEROLOGY | Facility: HOSPITAL | Age: 62
End: 2022-05-31

## 2022-05-31 ENCOUNTER — HOSPITAL ENCOUNTER (OUTPATIENT)
Dept: GASTROENTEROLOGY | Facility: HOSPITAL | Age: 62
Setting detail: OUTPATIENT SURGERY
Discharge: HOME | End: 2022-05-31
Attending: INTERNAL MEDICINE | Admitting: INTERNAL MEDICINE
Payer: MEDICARE

## 2022-05-31 VITALS
OXYGEN SATURATION: 94 % | SYSTOLIC BLOOD PRESSURE: 118 MMHG | WEIGHT: 181 LBS | DIASTOLIC BLOOD PRESSURE: 59 MMHG | TEMPERATURE: 97.9 F | BODY MASS INDEX: 30.9 KG/M2 | HEIGHT: 64 IN | RESPIRATION RATE: 18 BRPM | HEART RATE: 83 BPM

## 2022-05-31 DIAGNOSIS — K22.10 ULCER OF ESOPHAGUS WITHOUT BLEEDING: ICD-10-CM

## 2022-05-31 PROBLEM — E66.9 OBESITY, CLASS I, BMI 30-34.9: Status: ACTIVE | Noted: 2022-05-31

## 2022-05-31 PROBLEM — E66.811 OBESITY, CLASS I, BMI 30-34.9: Status: ACTIVE | Noted: 2022-05-31

## 2022-05-31 PROBLEM — I27.20 PULMONARY HYPERTENSION (HCC): Status: ACTIVE | Noted: 2022-05-31

## 2022-05-31 PROCEDURE — 88341 IMHCHEM/IMCYTCHM EA ADD ANTB: CPT | Performed by: PATHOLOGY

## 2022-05-31 PROCEDURE — 88312 SPECIAL STAINS GROUP 1: CPT | Performed by: PATHOLOGY

## 2022-05-31 PROCEDURE — 88342 IMHCHEM/IMCYTCHM 1ST ANTB: CPT | Performed by: PATHOLOGY

## 2022-05-31 PROCEDURE — 88305 TISSUE EXAM BY PATHOLOGIST: CPT | Performed by: PATHOLOGY

## 2022-05-31 PROCEDURE — 43239 EGD BIOPSY SINGLE/MULTIPLE: CPT | Performed by: INTERNAL MEDICINE

## 2022-05-31 RX ORDER — SODIUM CHLORIDE, SODIUM LACTATE, POTASSIUM CHLORIDE, CALCIUM CHLORIDE 600; 310; 30; 20 MG/100ML; MG/100ML; MG/100ML; MG/100ML
50 INJECTION, SOLUTION INTRAVENOUS CONTINUOUS
Status: CANCELLED | OUTPATIENT
Start: 2022-05-31

## 2022-05-31 RX ORDER — LIDOCAINE HYDROCHLORIDE 10 MG/ML
INJECTION, SOLUTION EPIDURAL; INFILTRATION; INTRACAUDAL; PERINEURAL AS NEEDED
Status: DISCONTINUED | OUTPATIENT
Start: 2022-05-31 | End: 2022-05-31

## 2022-05-31 RX ORDER — SODIUM CHLORIDE 9 MG/ML
125 INJECTION, SOLUTION INTRAVENOUS CONTINUOUS
Status: DISCONTINUED | OUTPATIENT
Start: 2022-05-31 | End: 2022-06-04 | Stop reason: HOSPADM

## 2022-05-31 RX ORDER — SUCRALFATE 1 G/1
1 TABLET ORAL
Qty: 90 TABLET | Refills: 3 | Status: SHIPPED | OUTPATIENT
Start: 2022-05-31

## 2022-05-31 RX ORDER — PROPOFOL 10 MG/ML
INJECTION, EMULSION INTRAVENOUS AS NEEDED
Status: DISCONTINUED | OUTPATIENT
Start: 2022-05-31 | End: 2022-05-31

## 2022-05-31 RX ORDER — LIDOCAINE HYDROCHLORIDE 10 MG/ML
0.5 INJECTION, SOLUTION EPIDURAL; INFILTRATION; INTRACAUDAL; PERINEURAL ONCE AS NEEDED
Status: CANCELLED | OUTPATIENT
Start: 2022-05-31

## 2022-05-31 RX ADMIN — PROPOFOL 80 MG: 10 INJECTION, EMULSION INTRAVENOUS at 10:56

## 2022-05-31 RX ADMIN — LIDOCAINE HYDROCHLORIDE 50 MG: 10 INJECTION, SOLUTION EPIDURAL; INFILTRATION; INTRACAUDAL at 10:56

## 2022-05-31 RX ADMIN — PROPOFOL 30 MG: 10 INJECTION, EMULSION INTRAVENOUS at 10:58

## 2022-05-31 RX ADMIN — SODIUM CHLORIDE 125 ML/HR: 0.9 INJECTION, SOLUTION INTRAVENOUS at 10:22

## 2022-05-31 RX ADMIN — PROPOFOL 40 MG: 10 INJECTION, EMULSION INTRAVENOUS at 11:00

## 2022-05-31 NOTE — H&P
History and Physical - [facility]  Michelle Stokes 64 y o  female MRN: [id number]    HPI: Michelle Stokes is a 64y o  year old female who presents for EGD  She had hiatal hernia status post hiatal hernia repair and TIF  Today here for follow-up EGD to assess healing of severe esophagitis      Review of Systems    Historical Information   Past Medical History:   Diagnosis Date    Anxiety     Arthritis     Back pain at L4-L5 level     Schreiber esophagus     Bulimia     Colon polyp     Disease of thyroid gland     hypothyroid    Ear problems     GERD (gastroesophageal reflux disease)     History of transfusion     Hyperlipidemia     Hypothyroidism     Leukopenia     Nasal congestion     NMS (neuroleptic malignant syndrome) 01/03/2020    Pneumonia     Rheumatoid arthritis involving right hip (HCC)     Sciatica     Seizure ([address] )     due to medication mix up 8/2018 only one historically    Seizures ([facility] )     Synovial cyst of lumbar spine     Vertigo     Weight gain      Past Surgical History:   Procedure Laterality Date    BONE MARROW BIOPSY      BREAST SURGERY      enlargement with implants    CLOSED REDUCTION DISTAL FEMUR FRACTURE      COLONOSCOPY      COSMETIC SURGERY      DENTAL SURGERY      HIP ARTHROPLASTY Right 1/2/2020    Procedure: REVISION TOTAL HIP ARTHROPLASTY WITH REPAIR OF PARIPROSTHETIC FRACTURE - POSTERIOR APPROACH;  Surgeon: Mica Santillan MD;  [facility];  Service: Orthopedics    CT ESOPHAGOGASTRODUODENOSCOPY TRANSORAL DIAGNOSTIC N/A 5/11/2021    Procedure: ESOPHAGOGASTRODUODENOSCOPY (EGD); Surgeon:  Radha Khan MD;  [facility];  Service: General    CT ESOPHAGUS SURG PROCEDURE UNLISTED N/A 5/11/2021    Procedure: FUNDOPLICATION TRANSORAL INCISIONLESS;  Surgeon: Rebecca Redd MD;  [facility]: [facility];  Service: Gastroenterology    CT LAP, REPAIR PARAESOPHAGEAL HERNIA, INCL FUNDOPLASTY W/ MESH N/A 5/11/2021 Procedure: REPAIR HERNIA PARAESOPHAGEAL  LAPAROSCOPIC;  Surgeon: Patricia Mcgarry MD;  [facility]: [facility];  Service: General    CO TOTAL HIP ARTHROPLASTY Right 12/11/2019    Procedure: ARTHROPLASTY HIP TOTAL ANTERIOR;  Surgeon: Ilan Alegria MD;  [facility];  Service: Orthopedics    RHINOPLASTY      SINUS SURGERY      TRANSORAL INCISIONLESS FUNDOPLICATION (TIF)  07/11/9617     Social History   Social History     Substance and Sexual Activity   Alcohol Use Not Currently     Social History     Substance and Sexual Activity   Drug Use Not Currently     Social History     Tobacco Use   Smoking Status Never Smoker   Smokeless Tobacco Never Used     Family History   Problem Relation Age of Onset    Uterine cancer Mother     Esophageal cancer Father     Colon cancer Maternal Grandmother        Meds/Allergies     (Not in a hospital admission)      Allergies   Allergen Reactions    Risperdal [Risperidone] Shortness Of Breath     Rapid heart beat, SOB    Zyprexa [Olanzapine] Shortness Of Breath     Rapid heartbeat    Latex Rash     Band aids, adhesives wears normal underwear, can eat bananas  USE PAPER TAPE       Objective     /76   Pulse 82   Temp 99 7 °F (37 6 °C) (Tympanic)   Resp 18   Ht 5' 4" (1 626 m)   Wt 82 1 kg (181 lb)   SpO2 95%   BMI 31 07 kg/m²       PHYSICAL EXAM    Gen: NAD  CV: RRR  CHEST: Clear  ABD: soft, NT/ND  EXT: no edema  Neuro: AAO      ASSESSMENT/PLAN:  This is a 64y o  year old female here for EGD for evaluation of esophagitis      PLAN:   Procedure:  EGD with biopsy

## 2022-05-31 NOTE — ANESTHESIA PREPROCEDURE EVALUATION
Procedure:  EGD    Relevant Problems   CARDIO   (+) Essential hypertension   (+) Hyperlipidemia   (+) Pulmonary hypertension (HCC)      ENDO   (+) Acquired hypothyroidism      GI/HEPATIC   (+) Erosive esophagitis   (+) Hiatal hernia      HEMATOLOGY   (+) Iron deficiency anemia due to acute on chronic blood loss      MUSCULOSKELETAL   (+) Chronic bilateral low back pain without sciatica   (+) DDD (degenerative disc disease), lumbar      NEURO/PSYCH   (+) Anxiety   (+) Chronic bilateral low back pain without sciatica      Other   (+) Obesity, Class I, BMI 30-34 9   (+) Schizoaffective disorder (HCC)        Physical Exam    Airway    Mallampati score: II  TM Distance: >3 FB  Neck ROM: full     Dental   Comment: edentulous,     Cardiovascular      Pulmonary      Other Findings        5/13/21 TTE:  LEFT VENTRICLE:  Systolic function was normal  Ejection fraction was estimated to be 66 %  There were no regional wall motion abnormalities  Wall thickness was mildly increased  There was mild concentric hypertrophy  Doppler parameters were consistent with abnormal left ventricular relaxation (grade 1 diastolic dysfunction)      LEFT ATRIUM:  The atrium was mildly dilated      MITRAL VALVE:  There was trace regurgitation      TRICUSPID VALVE:  There was trace regurgitation  Estimated peak PA pressure was 50 mmHg  The findings suggest moderate pulmonary hypertension  Anesthesia Plan  ASA Score- 3     Anesthesia Type- IV sedation with anesthesia with ASA Monitors  Additional Monitors:   Airway Plan:     Comment: I discussed the risks and benefits of IV sedation anesthesia including the possibility of the need to convert to general anesthesia and the potential risk of awareness  Plan Factors-Exercise tolerance (METS): >4 METS  Exercise comment: Able to climb two flights of stairs without cardiopulmonary limitation  Chart reviewed  EKG reviewed  Existing labs reviewed  Patient summary reviewed      Patient is not a current smoker  Patient did not smoke on day of surgery  Induction- intravenous  Postoperative Plan-     Informed Consent- Anesthetic plan and risks discussed with patient

## 2022-05-31 NOTE — ANESTHESIA POSTPROCEDURE EVALUATION
Post-Op Assessment Note    CV Status:  Stable    Pain management: adequate     Mental Status:  Arousable   Hydration Status:  Euvolemic   PONV Controlled:  Controlled   Airway Patency:  Patent      Post Op Vitals Reviewed: Yes            No complications documented      /60 (05/31/22 1107)    Temp 97 9 °F (36 6 °C) (05/31/22 1107)    Pulse 78 (05/31/22 1107)   Resp 18 (05/31/22 1107)    SpO2 97 % (05/31/22 1107)

## 2022-07-10 ENCOUNTER — NURSE TRIAGE (OUTPATIENT)
Dept: OTHER | Facility: OTHER | Age: 62
End: 2022-07-10

## 2022-07-10 DIAGNOSIS — I10 HYPERTENSION: ICD-10-CM

## 2022-07-10 RX ORDER — AMLODIPINE BESYLATE 5 MG/1
5 TABLET ORAL DAILY
Qty: 90 TABLET | Refills: 1 | Status: SHIPPED | OUTPATIENT
Start: 2022-07-10

## 2022-07-10 NOTE — TELEPHONE ENCOUNTER
Reason for Disposition   MILD or MODERATE vomiting (e g , 1 - 5 times / day)    Answer Assessment - Initial Assessment Questions  1  VOMITING SEVERITY: "How many times have you vomited in the past 24 hours?"      - MILD:  1 - 2 times/day     - MODERATE: 3 - 5 times/day, decreased oral intake without significant weight loss or symptoms of dehydration     - SEVERE: 6 or more times/day, vomits everything or nearly everything, with significant weight loss, symptoms of dehydration       Nothing today, but yesterday 4 times   2  ONSET: "When did the vomiting begin?"      Yesterday   3  FLUIDS: "What fluids or food have you vomited up today?" "Have you been able to keep any fluids down?"      Yes, able to keep fluids down   4  ABDOMINAL PAIN: "Are your having any abdominal pain?" If yes : "How bad is it and what does it feel like?" (e g , crampy, dull, intermittent, constant)      Denies  5  DIARRHEA: "Is there any diarrhea?" If Yes, ask: "How many times today?"      Denies  6  CONTACTS: "Is there anyone else in the family with the same symptoms?"       Denies  7  CAUSE: "What do you think is causing your vomiting?"      Unsure  Virus  8  HYDRATION STATUS: "Any signs of dehydration?" (e g , dry mouth [not only dry lips], too weak to stand) "When did you last urinate?"     Denies signs of dehydration   9  OTHER SYMPTOMS: "Do you have any other symptoms?" (e g , fever, headache, vertigo, vomiting blood or coffee grounds, recent head injury)     Dizziness started after vomiting      Protocols used: [facility] AND [facility]

## 2022-07-10 NOTE — TELEPHONE ENCOUNTER
Regarding: vomiting  ----- Message from [facility] sent at 7/10/2022  9:53 AM EDT -----  "My daughter is very ill  She is vomiting   She has just been in her bed "

## 2022-07-11 ENCOUNTER — HOSPITAL ENCOUNTER (EMERGENCY)
Facility: HOSPITAL | Age: 62
Discharge: HOME | End: 2022-07-11
Attending: EMERGENCY MEDICINE | Admitting: EMERGENCY MEDICINE
Payer: MEDICARE

## 2022-07-11 VITALS
RESPIRATION RATE: 18 BRPM | TEMPERATURE: 98.9 F | SYSTOLIC BLOOD PRESSURE: 151 MMHG | HEART RATE: 92 BPM | DIASTOLIC BLOOD PRESSURE: 91 MMHG | OXYGEN SATURATION: 98 %

## 2022-07-11 DIAGNOSIS — Z20.822 ENCOUNTER FOR LABORATORY TESTING FOR COVID-19 VIRUS: ICD-10-CM

## 2022-07-11 DIAGNOSIS — R19.7 DIARRHEA: ICD-10-CM

## 2022-07-11 DIAGNOSIS — E87.6 HYPOKALEMIA: ICD-10-CM

## 2022-07-11 DIAGNOSIS — R11.2 NAUSEA AND VOMITING: Primary | ICD-10-CM

## 2022-07-11 LAB
ALBUMIN SERPL BCP-MCNC: 3.1 G/DL (ref 3.5–5)
ALP SERPL-CCNC: 89 U/L (ref 46–116)
ALT SERPL W P-5'-P-CCNC: 24 U/L (ref 12–78)
ANION GAP SERPL CALCULATED.3IONS-SCNC: 8 MMOL/L (ref 4–13)
AST SERPL W P-5'-P-CCNC: 15 U/L (ref 5–45)
BASOPHILS # BLD AUTO: 0.05 THOUSANDS/ΜL (ref 0–0.1)
BASOPHILS NFR BLD AUTO: 1 % (ref 0–1)
BILIRUB SERPL-MCNC: 0.57 MG/DL (ref 0.2–1)
BUN SERPL-MCNC: 9 MG/DL (ref 5–25)
CALCIUM ALBUM COR SERPL-MCNC: 9.5 MG/DL (ref 8.3–10.1)
CALCIUM SERPL-MCNC: 8.8 MG/DL (ref 8.3–10.1)
CHLORIDE SERPL-SCNC: 102 MMOL/L (ref 100–108)
CO2 SERPL-SCNC: 25 MMOL/L (ref 21–32)
CREAT SERPL-MCNC: 0.62 MG/DL (ref 0.6–1.3)
EOSINOPHIL # BLD AUTO: 0.13 THOUSAND/ΜL (ref 0–0.61)
EOSINOPHIL NFR BLD AUTO: 3 % (ref 0–6)
ERYTHROCYTE [DISTWIDTH] IN BLOOD BY AUTOMATED COUNT: 17.8 % (ref 11.6–15.1)
FLUAV RNA RESP QL NAA+PROBE: NEGATIVE
FLUBV RNA RESP QL NAA+PROBE: NEGATIVE
GFR SERPL CREATININE-BSD FRML MDRD: 97 ML/MIN/1.73SQ M
GLUCOSE SERPL-MCNC: 93 MG/DL (ref 65–140)
HCT VFR BLD AUTO: 31.8 % (ref 34.8–46.1)
HGB BLD-MCNC: 9.6 G/DL (ref 11.5–15.4)
IMM GRANULOCYTES # BLD AUTO: 0.03 THOUSAND/UL (ref 0–0.2)
IMM GRANULOCYTES NFR BLD AUTO: 1 % (ref 0–2)
LIPASE SERPL-CCNC: 68 U/L (ref 73–393)
LYMPHOCYTES # BLD AUTO: 0.63 THOUSANDS/ΜL (ref 0.6–4.47)
LYMPHOCYTES NFR BLD AUTO: 12 % (ref 14–44)
MCH RBC QN AUTO: 21.7 PG (ref 26.8–34.3)
MCHC RBC AUTO-ENTMCNC: 30.2 G/DL (ref 31.4–37.4)
MCV RBC AUTO: 72 FL (ref 82–98)
MONOCYTES # BLD AUTO: 0.56 THOUSAND/ΜL (ref 0.17–1.22)
MONOCYTES NFR BLD AUTO: 11 % (ref 4–12)
NEUTROPHILS # BLD AUTO: 3.73 THOUSANDS/ΜL (ref 1.85–7.62)
NEUTS SEG NFR BLD AUTO: 72 % (ref 43–75)
NRBC BLD AUTO-RTO: 0 /100 WBCS
PLATELET # BLD AUTO: 304 THOUSANDS/UL (ref 149–390)
PMV BLD AUTO: 8.4 FL (ref 8.9–12.7)
POTASSIUM SERPL-SCNC: 2.9 MMOL/L (ref 3.5–5.3)
PROT SERPL-MCNC: 6.6 G/DL (ref 6.4–8.2)
RBC # BLD AUTO: 4.43 MILLION/UL (ref 3.81–5.12)
RSV RNA RESP QL NAA+PROBE: NEGATIVE
SARS-COV-2 RNA RESP QL NAA+PROBE: NEGATIVE
SODIUM SERPL-SCNC: 135 MMOL/L (ref 136–145)
WBC # BLD AUTO: 5.13 THOUSAND/UL (ref 4.31–10.16)

## 2022-07-11 PROCEDURE — 85025 COMPLETE CBC W/AUTO DIFF WBC: CPT | Performed by: EMERGENCY MEDICINE

## 2022-07-11 PROCEDURE — 99283 EMERGENCY DEPT VISIT LOW MDM: CPT

## 2022-07-11 PROCEDURE — 36415 COLL VENOUS BLD VENIPUNCTURE: CPT | Performed by: EMERGENCY MEDICINE

## 2022-07-11 PROCEDURE — 83690 ASSAY OF LIPASE: CPT | Performed by: EMERGENCY MEDICINE

## 2022-07-11 PROCEDURE — 96374 THER/PROPH/DIAG INJ IV PUSH: CPT

## 2022-07-11 PROCEDURE — 96361 HYDRATE IV INFUSION ADD-ON: CPT

## 2022-07-11 PROCEDURE — 99284 EMERGENCY DEPT VISIT MOD MDM: CPT | Performed by: EMERGENCY MEDICINE

## 2022-07-11 PROCEDURE — 0241U HB NFCT DS VIR RESP RNA 4 TRGT: CPT | Performed by: EMERGENCY MEDICINE

## 2022-07-11 PROCEDURE — 80053 COMPREHEN METABOLIC PANEL: CPT | Performed by: EMERGENCY MEDICINE

## 2022-07-11 RX ORDER — POTASSIUM CHLORIDE 20 MEQ/1
40 TABLET, EXTENDED RELEASE ORAL ONCE
Status: COMPLETED | OUTPATIENT
Start: 2022-07-11 | End: 2022-07-11

## 2022-07-11 RX ORDER — ONDANSETRON 2 MG/ML
4 INJECTION INTRAMUSCULAR; INTRAVENOUS ONCE
Status: COMPLETED | OUTPATIENT
Start: 2022-07-11 | End: 2022-07-11

## 2022-07-11 RX ADMIN — ONDANSETRON HYDROCHLORIDE 4 MG: 2 INJECTION, SOLUTION INTRAMUSCULAR; INTRAVENOUS at 13:15

## 2022-07-11 RX ADMIN — POTASSIUM CHLORIDE 40 MEQ: 1500 TABLET, EXTENDED RELEASE ORAL at 14:47

## 2022-07-11 RX ADMIN — SODIUM CHLORIDE 1000 ML: 0.9 INJECTION, SOLUTION INTRAVENOUS at 13:15

## 2022-07-11 NOTE — ED PROVIDER NOTES
History  Chief Complaint   Patient presents with    COVID-19 Exposure     [facility] with c/o fever, vomiting, diarrhea, shortness of breath  Recently exposed to covid  Was not tested yet  Pt is a 61yo F who presents for nausea, vomiting, and diarrhea  Pt reports her symptoms started 3 days ago  She reports 5-6 bouts of vomiting per day and 3-4 bouts of diarrhea, all non-bloody  She denies associated abdominal pain  She states that she called her PCP yesterday and had a virtual visit at which time they prescribed her Zofran  She reports that she took a dose 4 hours ago and has felt better since then  Pt reports that she is concerned that she may have COVID because she went to visit her representative and he was wearing three masks and standing far away', so is she worried that he may have had COVID  She denies any known COVID contacts  Pt denies SOB, cough, or CP  Pt denies any recent travel or abx  Pt takes her medications regularly with no recent changes  Prior to Admission Medications   Prescriptions Last Dose Informant Patient Reported? Taking? LORazepam (ATIVAN) 2 mg tablet   No No   Sig: TAKE 1 TABLET BY MOUTH EVERY 6 HOURS AS NEEDED FOR ANXIETY  PARoxetine (PAXIL) 30 mg tablet   No No   Sig: Take 2 tablets (60mg total) by mouth daily   QUEtiapine (SEROquel XR) 400 mg 24 hr tablet   No No   Sig: Take 1 tablet (400 mg total) by mouth daily at bedtime   acetaminophen (TYLENOL) 325 mg tablet  Self No No   Sig: Take 3 tablets (975 mg total) by mouth every 8 (eight) hours   amLODIPine (NORVASC) 5 mg tablet   No No   Sig: TAKE 1 TABLET (5 MG TOTAL) BY MOUTH DAILY     ferrous sulfate 325 (65 Fe) mg tablet   No No   Sig: Take 1 tablet (325 mg total) by mouth 2 (two) times a day with meals   levothyroxine 25 mcg tablet   No No   Sig: TAKE 1 TABLET BY MOUTH EVERY DAY   ondansetron (ZOFRAN) 4 mg tablet   No No   Sig: Take 1 tablet (4 mg total) by mouth every 8 (eight) hours as needed for nausea or vomiting   pantoprazole (PROTONIX) 40 mg tablet   No No   Sig: Take 1 tablet (40 mg total) by mouth 2 (two) times a day   sucralfate (CARAFATE) 1 g tablet   No No   Sig: Take 1 tablet (1 g total) by mouth 3 (three) times a day with meals   triamcinolone (KENALOG) 0 1 % cream   No No   Sig: APPLY 1 APPLICATION TOPICALLY 2 (TWO) TIMES A DAY TO AFFECTED AREA   ziprasidone (GEODON) 80 mg capsule   No No   Sig: TAKE 1 CAPSULE BY MOUTH EVERY DAY      Facility-Administered Medications: None       Past Medical History:   Diagnosis Date    Anxiety     Arthritis     Back pain at L4-L5 level     Schreiber esophagus     Bulimia     Colon polyp     Disease of thyroid gland     hypothyroid    Ear problems     GERD (gastroesophageal reflux disease)     History of transfusion     Hyperlipidemia     Hypothyroidism     Leukopenia     Nasal congestion     NMS (neuroleptic malignant syndrome) 01/03/2020    Pneumonia     Rheumatoid arthritis involving right hip (HCC)     Sciatica     Seizure ([address] )     due to medication mix up 8/2018 only one historically    Seizures ([address] )     Synovial cyst of lumbar spine     Vertigo     Weight gain        Past Surgical History:   Procedure Laterality Date    BONE MARROW BIOPSY      BREAST SURGERY      enlargement with implants    CLOSED REDUCTION DISTAL FEMUR FRACTURE      COLONOSCOPY      COSMETIC SURGERY      DENTAL SURGERY      HIP ARTHROPLASTY Right 1/2/2020    Procedure: REVISION TOTAL HIP ARTHROPLASTY WITH REPAIR OF PARIPROSTHETIC FRACTURE - POSTERIOR APPROACH;  Surgeon: Kaya Florentino MD;  [facility];  Service: Orthopedics    MD ESOPHAGOGASTRODUODENOSCOPY TRANSORAL DIAGNOSTIC N/A 5/11/2021    Procedure: ESOPHAGOGASTRODUODENOSCOPY (EGD); Surgeon:  Orion Angelo MD;  [facility];  Service: General    MD ESOPHAGUS SURG PROCEDURE UNLISTED N/A 5/11/2021    Procedure: FUNDOPLICATION TRANSORAL INCISIONLESS;  Surgeon: Amanda Ferrer MD;  [facility];  Service: Gastroenterology    DE LAP, REPAIR PARAESOPHAGEAL HERNIA, INCL FUNDOPLASTY W/ MESH N/A 5/11/2021    Procedure: REPAIR HERNIA PARAESOPHAGEAL  LAPAROSCOPIC;  Surgeon: Salima Mercedes MD;  [facility];  Service: General    DE TOTAL HIP ARTHROPLASTY Right 12/11/2019    Procedure: ARTHROPLASTY HIP TOTAL ANTERIOR;  Surgeon: Stevie Green MD;  [facility];  Service: Orthopedics    RHINOPLASTY      SINUS SURGERY      TRANSORAL INCISIONLESS FUNDOPLICATION (TIF)  17/06/4430       Family History   Problem Relation Age of Onset    Uterine cancer Mother     Esophageal cancer Father     Colon cancer Maternal Grandmother      I have reviewed and agree with the history as documented  E-Cigarette/Vaping    E-Cigarette Use Never User      E-Cigarette/Vaping Substances    Nicotine No     THC No     CBD No     Flavoring No     Other No     Unknown No      Social History     Tobacco Use    Smoking status: Never Smoker    Smokeless tobacco: Never Used   Vaping Use    Vaping Use: Never used   Substance Use Topics    Alcohol use: Not Currently    Drug use: Not Currently        Review of Systems   Constitutional: Positive for fever (subjective)  Gastrointestinal: Positive for diarrhea, nausea and vomiting  Negative for abdominal pain  Psychiatric/Behavioral: The patient is nervous/anxious  All other systems reviewed and are negative  Physical Exam  ED Triage Vitals [07/11/22 1225]   Temperature Pulse Respirations Blood Pressure SpO2   98 9 °F (37 2 °C) 92 18 151/91 98 %      Temp Source Heart Rate Source Patient Position - Orthostatic VS BP Location FiO2 (%)   Oral Monitor -- -- --      Pain Score       --             Orthostatic Vital Signs  Vitals:    07/11/22 1225   BP: 151/91   Pulse: 92       Physical Exam  Vitals reviewed  Constitutional:       General: She is not in acute distress  Appearance: She is well-developed   She is not toxic-appearing or diaphoretic  HENT:      Head: Normocephalic and atraumatic  Right Ear: External ear normal       Left Ear: External ear normal       Nose: Nose normal       Mouth/Throat:      Mouth: Mucous membranes are moist       Pharynx: Oropharynx is clear  Eyes:      Extraocular Movements: Extraocular movements intact  Pupils: Pupils are equal, round, and reactive to light  Cardiovascular:      Rate and Rhythm: Normal rate and regular rhythm  Heart sounds: Normal heart sounds  Pulmonary:      Effort: Pulmonary effort is normal  No respiratory distress  Breath sounds: Normal breath sounds  No stridor  No wheezing or rales  Abdominal:      General: There is no distension  Palpations: Abdomen is soft  Tenderness: There is no abdominal tenderness  There is no guarding or rebound  Musculoskeletal:         General: Normal range of motion  Cervical back: Normal range of motion  Skin:     General: Skin is warm and dry  Capillary Refill: Capillary refill takes less than 2 seconds  Neurological:      Mental Status: She is alert and oriented to person, place, and time  Psychiatric:         Mood and Affect: Mood is anxious  Speech: Speech normal          Behavior: Behavior is cooperative           ED Medications  Medications   sodium chloride 0 9 % bolus 1,000 mL (0 mL Intravenous Stopped 7/11/22 1424)   ondansetron (ZOFRAN) injection 4 mg (4 mg Intravenous Given 7/11/22 1315)   potassium chloride (K-DUR,KLOR-CON) CR tablet 40 mEq (40 mEq Oral Given 7/11/22 1447)       Diagnostic Studies  Results Reviewed     Procedure Component Value Units Date/Time    FLU/RSV/COVID - if FLU/RSV clinically relevant [[id number]]  (Normal) Collected: 07/11/22 1314    Lab Status: Final result Specimen: Nares from Nasopharyngeal Swab Updated: 07/11/22 1424     SARS-CoV-2 Negative     INFLUENZA A PCR Negative     INFLUENZA B PCR Negative     RSV PCR Negative    Narrative: FOR PEDIATRIC PATIENTS - copy/paste COVID Guidelines URL to browser: [email or web address]    SARS-CoV-2 assay is a Nucleic Acid Amplification assay intended for the  qualitative detection of nucleic acid from SARS-CoV-2 in nasopharyngeal  swabs  Results are for the presumptive identification of SARS-CoV-2 RNA  Positive results are indicative of infection with SARS-CoV-2, the virus  causing COVID-19, but do not rule out bacterial infection or co-infection  with other viruses  Laboratories within the [place] and its  territories are required to report all positive results to the appropriate  public health authorities  Negative results do not preclude SARS-CoV-2  infection and should not be used as the sole basis for treatment or other  patient management decisions  Negative results must be combined with  clinical observations, patient history, and epidemiological information  This test has not been FDA cleared or approved  This test has been authorized by FDA under an Emergency Use Authorization  (EUA)  This test is only authorized for the duration of time the  declaration that circumstances exist justifying the authorization of the  emergency use of an in vitro diagnostic tests for detection of SARS-CoV-2  virus and/or diagnosis of COVID-19 infection under section 564(b)(1) of  the Act, 21 U  S C  087PMA-5(E)(8), unless the authorization is terminated  or revoked sooner  The test has been validated but independent review by FDA  and CLIA is pending  Test performed using [employer] GeneXpert: This RT-PCR assay targets N2,  a region unique to SARS-CoV-2  A conserved region in the E-gene was chosen  for pan-Sarbecovirus detection which includes SARS-CoV-2      Comprehensive metabolic panel [[id number]]  (Abnormal) Collected: 07/11/22 1314    Lab Status: Final result Specimen: Blood from Arm, Left Updated: 07/11/22 1350     Sodium 135 mmol/L      Potassium 2 9 mmol/L      Chloride 102 mmol/L      CO2 25 mmol/L      ANION GAP 8 mmol/L      BUN 9 mg/dL      Creatinine 0 62 mg/dL      Glucose 93 mg/dL      Calcium 8 8 mg/dL      Corrected Calcium 9 5 mg/dL      AST 15 U/L      ALT 24 U/L      Alkaline Phosphatase 89 U/L      Total Protein 6 6 g/dL      Albumin 3 1 g/dL      Total Bilirubin 0 57 mg/dL      eGFR 97 ml/min/1 73sq m     Narrative:      National Kidney Disease Foundation guidelines for Chronic Kidney Disease (CKD):     Stage 1 with normal or high GFR (GFR > 90 mL/min/1 73 square meters)    Stage 2 Mild CKD (GFR = 60-89 mL/min/1 73 square meters)    Stage 3A Moderate CKD (GFR = 45-59 mL/min/1 73 square meters)    Stage 3B Moderate CKD (GFR = 30-44 mL/min/1 73 square meters)    Stage 4 Severe CKD (GFR = 15-29 mL/min/1 73 square meters)    Stage 5 End Stage CKD (GFR <15 mL/min/1 73 square meters)  Note: GFR calculation is accurate only with a steady state creatinine    Lipase [[id number]]  (Abnormal) Collected: 07/11/22 1314    Lab Status: Final result Specimen: Blood from Arm, Left Updated: 07/11/22 1350     Lipase 68 u/L     CBC and differential [[id number]]  (Abnormal) Collected: 07/11/22 1314    Lab Status: Final result Specimen: Blood from Arm, Left Updated: 07/11/22 1328     WBC 5 13 Thousand/uL      RBC 4 43 Million/uL      Hemoglobin 9 6 g/dL      Hematocrit 31 8 %      MCV 72 fL      MCH 21 7 pg      MCHC 30 2 g/dL      RDW 17 8 %      MPV 8 4 fL      Platelets 326 Thousands/uL      nRBC 0 /100 WBCs      Neutrophils Relative 72 %      Immat GRANS % 1 %      Lymphocytes Relative 12 %      Monocytes Relative 11 %      Eosinophils Relative 3 %      Basophils Relative 1 %      Neutrophils Absolute 3 73 Thousands/µL      Immature Grans Absolute 0 03 Thousand/uL      Lymphocytes Absolute 0 63 Thousands/µL      Monocytes Absolute 0 56 Thousand/µL      Eosinophils Absolute 0 13 Thousand/µL      Basophils Absolute 0 05 Thousands/µL                  No orders to display         Procedures  Procedures      ED Course  ED Course as of 07/12/22 1618   Mon Jul 11, 2022   1328 Hemoglobin(!): 9 6  Anemia improved from prior  1328 CBC and differential(!)  Reviewed and without actionable derangement  1356 Potassium(!): 2 9  Will replete orally  1356 Lipase(!): 68   1428 FLU/RSV/COVID - if FLU/RSV clinically relevant  Negative                                       MDM  Number of Diagnoses or Management Options  Diarrhea  Encounter for laboratory testing for COVID-19 virus  Hypokalemia  Nausea and vomiting  Diagnosis management comments: Pt is a 63yo F who presents with N/V/D  Exam pertinent for non-tender abdomen and normal vitals  Differential diagnosis to include but not limited to gastroenteritis, viral syndrome, intra-abdominal pathology, dehydration, electrolyte abnormality  Will plan for abdominal labs including lipase and will treat symptomatically with Zofran and fluids  With benign abdominal exam, no indication for imaging at this time  Pt also requesting COVID test which will be performed  W/u largely unremarkable aside from hypoK which was repleted orally  Reassessed pt who reports she is feeling better and is able to tolerate PO with the Zofran  Discussed all results as well as plan for DC  Pt agreeable  Plan to discharge pt with f/u to PCP  Discussed returning the ED with significant worsening of symptoms  Discussed use of over the counter medications as stated on the bottle as needed for pain  Discussed taking Zofran as prescribed by PCP as needed for further nausea and vomiting  Pt expressed understanding of discharge instructions, return precautions, and medication instructions  All questions were answered and pt was discharged without incident              Amount and/or Complexity of Data Reviewed  Clinical lab tests: ordered and reviewed        Disposition  Final diagnoses:   Nausea and vomiting   Hypokalemia   Diarrhea   Encounter for laboratory testing for COVID-19 virus     Time reflects when diagnosis was documented in both MDM as applicable and the Disposition within this note     Time User Action Codes Description Comment    7/11/2022  2:33 PM Christopher Lopes Add [R11 2] Nausea and vomiting     7/11/2022  2:33 PM Christopher Lopes Add [E87 6] Hypokalemia     7/11/2022  2:33 PM Christopher Rangeler Add [R19 7] Diarrhea     7/11/2022  2:33 PM Christopher Rangeler Add [Z20 822] Encounter for laboratory testing for COVID-19 virus       ED Disposition     ED Disposition   Discharge    Condition   Stable    Date/Time   Mon Jul 11, 2022  2:33 PM    Comment   [facility] discharge to home/self care  Follow-up Information     Follow up With Specialties Details Why Contact Info    Marielle Adan MD Internal Medicine Call  As needed [id number]  [phone]            Discharge Medication List as of 7/11/2022  2:38 PM      CONTINUE these medications which have NOT CHANGED    Details   acetaminophen (TYLENOL) 325 mg tablet Take 3 tablets (975 mg total) by mouth every 8 (eight) hours, Starting Fri 5/14/2021, Normal      amLODIPine (NORVASC) 5 mg tablet TAKE 1 TABLET (5 MG TOTAL) BY MOUTH DAILY  , Starting Sun 7/10/2022, Normal      ferrous sulfate 325 (65 Fe) mg tablet Take 1 tablet (325 mg total) by mouth 2 (two) times a day with meals, Starting Wed 1/12/2022, Normal      levothyroxine 25 mcg tablet TAKE 1 TABLET BY MOUTH EVERY DAY, Normal      LORazepam (ATIVAN) 2 mg tablet TAKE 1 TABLET BY MOUTH EVERY 6 HOURS AS NEEDED FOR ANXIETY , Normal      ondansetron (ZOFRAN) 4 mg tablet Take 1 tablet (4 mg total) by mouth every 8 (eight) hours as needed for nausea or vomiting, Starting Fri 4/29/2022, Normal      pantoprazole (PROTONIX) 40 mg tablet Take 1 tablet (40 mg total) by mouth 2 (two) times a day, Starting Fri 4/8/2022, Normal      PARoxetine (PAXIL) 30 mg tablet Take 2 tablets (60mg total) by mouth daily, Normal QUEtiapine (SEROquel XR) 400 mg 24 hr tablet Take 1 tablet (400 mg total) by mouth daily at bedtime, Starting Mon 2/14/2022, Normal      sucralfate (CARAFATE) 1 g tablet Take 1 tablet (1 g total) by mouth 3 (three) times a day with meals, Starting Tue 5/31/2022, Normal      triamcinolone (KENALOG) 0 1 % cream APPLY 1 APPLICATION TOPICALLY 2 (TWO) TIMES A DAY TO AFFECTED AREA, Normal      ziprasidone (GEODON) 80 mg capsule TAKE 1 CAPSULE BY MOUTH EVERY DAY, Normal           No discharge procedures on file  PDMP Review       Value Time User    PDMP Reviewed  Yes 4/25/2022  1:23 PM Josesito Garcia MD           ED Provider  Attending physically available and evaluated Ping Liu  DAMARIS managed the patient along with the ED Attending      Electronically Signed by         Shelly Rossi MD  07/12/22 6329

## 2022-07-11 NOTE — ED ATTENDING ATTESTATION
7/11/2022  ISonya MD, saw and evaluated the patient  I have discussed the patient with the resident/non-physician practitioner and agree with the resident's/non-physician practitioner's findings, Plan of Care, and MDM as documented in the resident's/non-physician practitioner's note, except where noted  All available labs and Radiology studies were reviewed  I was present for key portions of any procedure(s) performed by the resident/non-physician practitioner and I was immediately available to provide assistance  At this point I agree with the current assessment done in the Emergency Department  I have conducted an independent evaluation of this patient a history and physical is as follows:  Vomiting and diarrhea, patient is afraid of COVID exposure and would like to be tested  Benign exam here    Agree with documentation  ED Course         Critical Care Time  Procedures

## 2022-07-11 NOTE — DISCHARGE INSTRUCTIONS
Follow-up with PCP for further care  Contact info provided below if needed  Eat potassium rich foods such as bananas, potatoes, avocados  Use prescribed Zofran as needed for nausea and vomiting  Stay hydrated  Return to the ED with new or worsening symptoms

## 2022-07-13 ENCOUNTER — TELEMEDICINE (OUTPATIENT)
Dept: INTERNAL MEDICINE CLINIC | Facility: CLINIC | Age: 62
End: 2022-07-13
Payer: MEDICARE

## 2022-07-13 DIAGNOSIS — E03.9 ACQUIRED HYPOTHYROIDISM: ICD-10-CM

## 2022-07-13 DIAGNOSIS — R11.2 NAUSEA AND VOMITING, UNSPECIFIED VOMITING TYPE: ICD-10-CM

## 2022-07-13 DIAGNOSIS — K22.719 BARRETT'S ESOPHAGUS WITH DYSPLASIA: Primary | ICD-10-CM

## 2022-07-13 DIAGNOSIS — I10 ESSENTIAL HYPERTENSION: ICD-10-CM

## 2022-07-13 PROCEDURE — 99213 OFFICE O/P EST LOW 20 MIN: CPT | Performed by: INTERNAL MEDICINE

## 2022-07-13 NOTE — PROGRESS NOTES
Virtual Regular Visit    Verification of patient location:    Patient is located in the following state in which I hold an active license [place]      Assessment/Plan:    Problem List Items Addressed This Visit        Digestive    Nausea and vomiting     Improving, patient has Zofran at home to use as needed           Schreiber's esophagus - Primary     Follow up GI              Endocrine    Acquired hypothyroidism     Continue levothyroxine along with monitoring              Cardiovascular and Mediastinum    Essential hypertension     Continue med                      Reason for visit is   Chief Complaint   Patient presents with    Virtual Regular Visit        Encounter provider Maci Craig MD    Provider located at [address] [place] [id number]      Recent Visits  No visits were found meeting these conditions  Showing recent visits within past 7 days and meeting all other requirements  Today's Visits  Date Type Provider Dept   07/13/22 Telemedicine Maci Craig, [facility] today's visits and meeting all other requirements  Future Appointments  No visits were found meeting these conditions  Showing future appointments within next 150 days and meeting all other requirements       The patient was identified by name and date of birth  Antonio Cunningham was informed that this is a telemedicine visit and that the visit is being conducted through Telephone  My office door was closed  No one else was in the room  She acknowledged consent and understanding of privacy and security of the video platform  The patient has agreed to participate and understands they can discontinue the visit at any time  Patient is aware this is a billable service  Subjective  Antonio Cunningham is a 64 y o  female    I called patient and got voicemail    Later I got through to pt  Patient was in the emergency room for vomiting 7/11  Since home, she 1 episode of vomiting where she vomited 4 times  Past Medical History:   Diagnosis Date    Anxiety     Arthritis     Back pain at L4-L5 level     Schreiber esophagus     Bulimia     Colon polyp     Disease of thyroid gland     hypothyroid    Ear problems     GERD (gastroesophageal reflux disease)     History of transfusion     Hyperlipidemia     Hypothyroidism     Leukopenia     Nasal congestion     NMS (neuroleptic malignant syndrome) 01/03/2020    Pneumonia     Rheumatoid arthritis involving right hip (HCC)     Sciatica     Seizure ([address] )     due to medication mix up 8/2018 only one historically    Seizures ([facility] [address]     Synovial cyst of lumbar spine     Vertigo     Weight gain        Past Surgical History:   Procedure Laterality Date    BONE MARROW BIOPSY      BREAST SURGERY      enlargement with implants    CLOSED REDUCTION DISTAL FEMUR FRACTURE      COLONOSCOPY      COSMETIC SURGERY      DENTAL SURGERY      HIP ARTHROPLASTY Right 1/2/2020    Procedure: REVISION TOTAL HIP ARTHROPLASTY WITH REPAIR OF PARIPROSTHETIC FRACTURE - POSTERIOR APPROACH;  Surgeon: Shailesh Shen MD;  [facility];  Service: Orthopedics    MI ESOPHAGOGASTRODUODENOSCOPY TRANSORAL DIAGNOSTIC N/A 5/11/2021    Procedure: ESOPHAGOGASTRODUODENOSCOPY (EGD); Surgeon: Brisa Campos MD;  [facility];  Service: General    MI ESOPHAGUS SURG PROCEDURE UNLISTED N/A 5/11/2021    Procedure: FUNDOPLICATION TRANSORAL INCISIONLESS;  Surgeon: Andrews Smith MD;  [facility];  Service: Gastroenterology    MI LAP, REPAIR PARAESOPHAGEAL HERNIA, INCL FUNDOPLASTY W/ MESH N/A 5/11/2021    Procedure: REPAIR HERNIA PARAESOPHAGEAL  LAPAROSCOPIC;  Surgeon:  Brisa Campos MD;  [facility];  Service: General    MI TOTAL HIP ARTHROPLASTY Right 12/11/2019    Procedure: ARTHROPLASTY HIP TOTAL ANTERIOR;  Surgeon: Shailesh Shen MD;  [facility];  Service: Orthopedics   Laurie Candelaria RHINOPLASTY      SINUS SURGERY      TRANSORAL INCISIONLESS FUNDOPLICATION (TIF)  48/33/4139       Current Outpatient Medications   Medication Sig Dispense Refill    acetaminophen (TYLENOL) 325 mg tablet Take 3 tablets (975 mg total) by mouth every 8 (eight) hours 30 tablet 0    amLODIPine (NORVASC) 5 mg tablet TAKE 1 TABLET (5 MG TOTAL) BY MOUTH DAILY  90 tablet 1    ferrous sulfate 325 (65 Fe) mg tablet Take 1 tablet (325 mg total) by mouth 2 (two) times a day with meals 60 tablet 0    levothyroxine 25 mcg tablet TAKE 1 TABLET BY MOUTH EVERY DAY 90 tablet 3    LORazepam (ATIVAN) 2 mg tablet TAKE 1 TABLET BY MOUTH EVERY 6 HOURS AS NEEDED FOR ANXIETY  120 tablet 1    ondansetron (ZOFRAN) 4 mg tablet Take 1 tablet (4 mg total) by mouth every 8 (eight) hours as needed for nausea or vomiting 20 tablet 5    pantoprazole (PROTONIX) 40 mg tablet Take 1 tablet (40 mg total) by mouth 2 (two) times a day 60 tablet 0    PARoxetine (PAXIL) 30 mg tablet Take 2 tablets (60mg total) by mouth daily 180 tablet 3    QUEtiapine (SEROquel XR) 400 mg 24 hr tablet Take 1 tablet (400 mg total) by mouth daily at bedtime 90 tablet 3    sucralfate (CARAFATE) 1 g tablet Take 1 tablet (1 g total) by mouth 3 (three) times a day with meals 90 tablet 3    triamcinolone (KENALOG) 0 1 % cream APPLY 1 APPLICATION TOPICALLY 2 (TWO) TIMES A DAY TO AFFECTED AREA 30 g 2    ziprasidone (GEODON) 80 mg capsule TAKE 1 CAPSULE BY MOUTH EVERY DAY 90 capsule 3     No current facility-administered medications for this visit  Allergies   Allergen Reactions    Risperdal [Risperidone] Shortness Of Breath     Rapid heart beat, SOB    Zyprexa [Olanzapine] Shortness Of Breath     Rapid heartbeat    Latex Rash     Band aids, adhesives wears normal underwear, can eat bananas  USE PAPER TAPE       Review of Systems   Constitutional: Negative for chills and fever  Gastrointestinal: Positive for nausea and vomiting   Negative for abdominal pain, constipation and diarrhea  Video Exam  It was my intent to perform this visit via video technology but the patient was not able to do a video connection so the visit was completed via audio telephone only  There were no vitals filed for this visit  Physical Exam     I spent 20 minutes with patient today in which greater than 50% of the time was spent in counseling/coordination of care regarding acute and chronic conditions    VIRTUAL VISIT DISCLAIMER      Everette Plascencia verbally agrees to participate in [employer]  Pt is aware that [employer] could be limited without vital signs or the ability to perform a full hands-on physical exam  Irene Plascencia understands she or the provider may request at any time to terminate the video visit and request the patient to seek care or treatment in person

## 2022-07-19 DIAGNOSIS — K22.10 EROSIVE ESOPHAGITIS: ICD-10-CM

## 2022-07-20 RX ORDER — PANTOPRAZOLE SODIUM 40 MG/1
40 TABLET, DELAYED RELEASE ORAL 2 TIMES DAILY
Qty: 60 TABLET | Refills: 5 | Status: SHIPPED | OUTPATIENT
Start: 2022-07-20

## 2022-07-21 DIAGNOSIS — F41.9 ANXIETY: ICD-10-CM

## 2022-07-21 RX ORDER — LORAZEPAM 2 MG/1
2 TABLET ORAL EVERY 6 HOURS PRN
Qty: 120 TABLET | Refills: 0 | Status: SHIPPED | OUTPATIENT
Start: 2022-07-21 | End: 2022-08-29 | Stop reason: SDUPTHER

## 2022-07-27 ENCOUNTER — TELEPHONE (OUTPATIENT)
Dept: INTERNAL MEDICINE CLINIC | Facility: CLINIC | Age: 62
End: 2022-07-27

## 2022-08-03 ENCOUNTER — TELEPHONE (OUTPATIENT)
Dept: INTERNAL MEDICINE CLINIC | Facility: CLINIC | Age: 62
End: 2022-08-03

## 2022-08-03 DIAGNOSIS — R21 RASH: Primary | ICD-10-CM

## 2022-08-11 ENCOUNTER — TELEPHONE (OUTPATIENT)
Dept: PODIATRY | Facility: CLINIC | Age: 62
End: 2022-08-11

## 2022-08-14 DIAGNOSIS — R11.2 NAUSEA AND VOMITING, UNSPECIFIED VOMITING TYPE: ICD-10-CM

## 2022-08-14 DIAGNOSIS — K21.00 GASTROESOPHAGEAL REFLUX DISEASE WITH ESOPHAGITIS, UNSPECIFIED WHETHER HEMORRHAGE: ICD-10-CM

## 2022-08-14 RX ORDER — PANTOPRAZOLE SODIUM 40 MG/1
TABLET, DELAYED RELEASE ORAL
Qty: 180 TABLET | Refills: 2 | Status: SHIPPED | OUTPATIENT
Start: 2022-08-14 | End: 2022-09-21

## 2022-08-16 ENCOUNTER — CONSULT (OUTPATIENT)
Dept: DERMATOLOGY | Facility: CLINIC | Age: 62
End: 2022-08-16
Payer: MEDICARE

## 2022-08-16 DIAGNOSIS — L29.9 PRURITUS: ICD-10-CM

## 2022-08-16 DIAGNOSIS — R21 RASH: ICD-10-CM

## 2022-08-16 PROCEDURE — 99204 OFFICE O/P NEW MOD 45 MIN: CPT | Performed by: DERMATOLOGY

## 2022-08-16 RX ORDER — TRIAMCINOLONE ACETONIDE 1 MG/G
CREAM TOPICAL
Qty: 80 G | Refills: 3 | Status: SHIPPED | OUTPATIENT
Start: 2022-08-16 | End: 2022-09-23 | Stop reason: SDUPTHER

## 2022-08-16 NOTE — PATIENT INSTRUCTIONS
RASH -  PRURITIS    Assessment and Plan:  Based on a thorough discussion of this condition and the management approach to it (including a comprehensive discussion of the known risks, side effects and potential benefits of treatment), the patient (family) agrees to implement the following specific plan:    During flares, can continue using the Triamcinolone 0 1% cream twice daily for up to 2 weeks then take a break from using for 1 week  When not flaring, can apply triamcinolone cream to areas prone to rash 1-2 times weekly  Do NOT use on face, armpits, or groin     Moisturizer with Cetaphil cream or CeraVe cream 2 - 3 times daily   Discussed that if no improvement, can try phototherapy 2-3 times weekly  Short luke warm showers or baths  Dove white bar soap  Sensitive laundry detergent and if possible do an extra rinse   No dryer sheets or fabric softener  Try to eliminate fragrances  Creams instead of lotions Eucerin, Aveeno, Cetaphil apply topically two times a day

## 2022-08-16 NOTE — PROGRESS NOTES
[facility] Note     Patient Name: Brando Posadas  Encounter Date: 8/16/22     Have you been cared for by a [facility] Dermatologist in the last 3 years and, if so, which one? No    · Have you traveled outside of the [facility] in the past 3 months? No     May we call your Preferred Phone number to discuss your specific medical information? Yes     May we leave a detailed message that includes your specific medical information? Yes      Today's Chief Concerns:   Concern #1:  Rash      Past Medical History:  Have you personally ever had or currently have any of the following? · Skin cancer (such as Melanoma, Basal Cell Carcinoma, Squamous Cell Carcinoma? (If Yes, please provide more detail)- No  · Eczema: No  · Psoriasis: No  · HIV/AIDS: No  · Hepatitis B or C: No  · Tuberculosis: No  · Systemic Immunosuppression such as Diabetes, Biologic or Immunotherapy, Chemotherapy, Organ Transplantation, Bone Marrow Transplantation (If YES, please provide more detail): No  · Radiation Treatment (If YES, please provide more detail): No  · Any other major medical conditions/concerns? (If Yes, which types)- No    Social History:    What is/was your primary occupation? retired    What are your hobbies/past-times? Family history:    Have any of your "first degree relatives" (parent, brother, sister, or child) had any of the following       · Skin cancer such as Melanoma or Merkel Cell Carcinoma or Pancreatic Cancer? No  · Eczema, Asthma, Hay Fever or Seasonal Allergies: No  · Psoriasis or Psoriatic Arthritis: No  · Do any other medical conditions seem to run in your family? If Yes, what condition and which relatives?   No    Current Medications   Current Outpatient Medications:     acetaminophen (TYLENOL) 325 mg tablet, Take 3 tablets (975 mg total) by mouth every 8 (eight) hours, Disp: 30 tablet, Rfl: 0    amLODIPine (NORVASC) 5 mg tablet, TAKE 1 TABLET (5 MG TOTAL) BY MOUTH DAILY  , Disp: 90 tablet, Rfl: 1    ferrous sulfate 325 (65 Fe) mg tablet, Take 1 tablet (325 mg total) by mouth 2 (two) times a day with meals, Disp: 60 tablet, Rfl: 0    levothyroxine 25 mcg tablet, TAKE 1 TABLET BY MOUTH EVERY DAY, Disp: 90 tablet, Rfl: 3    LORazepam (ATIVAN) 2 mg tablet, Take 1 tablet (2 mg total) by mouth every 6 (six) hours as needed for anxiety, Disp: 120 tablet, Rfl: 0    ondansetron (ZOFRAN) 4 mg tablet, Take 1 tablet (4 mg total) by mouth every 8 (eight) hours as needed for nausea or vomiting, Disp: 20 tablet, Rfl: 5    pantoprazole (PROTONIX) 40 mg tablet, Take 1 tablet (40 mg total) by mouth 2 (two) times a day, Disp: 60 tablet, Rfl: 5    pantoprazole (PROTONIX) 40 mg tablet, TAKE 1 TABLET BY MOUTH TWICE A DAY, Disp: 180 tablet, Rfl: 2    PARoxetine (PAXIL) 30 mg tablet, Take 2 tablets (60mg total) by mouth daily, Disp: 180 tablet, Rfl: 3    QUEtiapine (SEROquel XR) 400 mg 24 hr tablet, Take 1 tablet (400 mg total) by mouth daily at bedtime, Disp: 90 tablet, Rfl: 3    sucralfate (CARAFATE) 1 g tablet, Take 1 tablet (1 g total) by mouth 3 (three) times a day with meals, Disp: 90 tablet, Rfl: 3    triamcinolone (KENALOG) 0 1 % cream, APPLY 1 APPLICATION TOPICALLY 2 (TWO) TIMES A DAY TO AFFECTED AREA, Disp: 30 g, Rfl: 2    ziprasidone (GEODON) 80 mg capsule, TAKE 1 CAPSULE BY MOUTH EVERY DAY, Disp: 90 capsule, Rfl: 3      Review of Systems/System Alerts:  Have you recently had or currently have any of the following? If YES, what are you doing for the problem? · Fever, chills or unintended weight loss: No  · Sudden loss or change in your vision: No  · Nausea, vomiting or blood in your stool: YES  · Painful or swollen joints: No  · Wheezing or cough: No  · Changing mole or non-healing wound: YES  · Nosebleeds: No  · Excessive sweating: No  · Easy or prolonged bleeding?   No  · Over the last 2 weeks, how often have you been bothered by the following problems? · Taking little interest or pleasure in doing things: 1 - Not at All  · Feeling down, depressed, or hopeless: 1 - Not at All  · Rapid heartbeat with epinephrine:  No  · FEMALES ONLY:    · Are you pregnant or planning to become pregnant? No  · Are you currently or planning to be nursing or breast feeding? No  · Any known allergies? YES, see below  Allergies   Allergen Reactions    Risperdal [Risperidone] Shortness Of Breath     Rapid heart beat, SOB    Zyprexa [Olanzapine] Shortness Of Breath     Rapid heartbeat    Latex Rash     Band aids, adhesives wears normal underwear, can eat bananas  USE PAPER TAPE   ·       PHYSICAL EXAM:       Was a chaperone (Derm Clinical Assistant) present throughout the entire Physical Exam? Yes     Did the Dermatology Team specifically  the patient on the importance of a Full Skin Exam to be sure that nothing is missed clinically? Yes  o Did the patient request or accept a Full Skin Exam?  NO  o Did the patient specifically refuse to have the areas "under-the-bra" examined by the Dermatologist? No  o Did the patient specifically refuse to have the areas "under-the-underwear" examined by the Dermatologist? No      CONSTITUTIONAL  There were no vitals filed for this visit      PSYCH: Normal mood and affect  EYES: Normal conjunctiva  ENT: Normal lips and oral mucosa  CARDIOVASCULAR: No edema  RESPIRATORY: Normal respirations    SKIN:  FULL ORGAN SYSTEM EXAM  Face Normal except as noted below in Assessment   Right Arm/Hand/Fingers Normal except as noted below in Assessment   Left Arm/Hand/Fingers Normal except as noted below in Assessment   Abdomen, Umbilicus Normal except as noted below in Assessment   Back/Spine Normal except as noted below in Assessment   Right Leg, Foot, Toes Normal except as noted below in Assessment   Left Leg, Foot, Toes Normal except as noted below in Assessment        ASSESSMENT AND PLAN BY DIAGNOSIS:    History of Present Condition:     Duration:  How long has this been an issue for you?    o  1 month   Location Affected:  Where on the body is this affecting you?    o  lower legs and arms   Quality:  Is there any bleeding, pain, itch, burning/irritation, or redness associated with the skin lesion? o  itchy and scabbed   Severity:  Describe any bleeding, pain, itch, burning/irritation, or redness on a scale of 1 to 10 (with 10 being the worst)  o  10   Timing:  Does this condition seem to be there pretty constantly or do you notice it more at specific times throughout the day? o  consistent   Context:  Have you ever noticed that this condition seems to be associated with specific activities you do?    o  denies   Modifying Factors:    o Anything that seems to make the condition worse?    -  denies  o What have you tried to do to make the condition better?    -  triamcinolone 0 1%   Associated Signs and Symptoms:  Does this skin lesion seem to be associated with any of the following:  o  SL AMB DERM SIGNS AND SYMPTOMS: Itching and Scratching       RASH (ECZEMA VS PRURIGO)  PRURITIS    Physical Exam:   (Anatomic Location); (Size and Morphological Description); (Differential Diagnosis):  o Eroded macules, scattered papules and scaly plaques on bilateral lower extremities and bilateral forearms; Legs >>>> arms    Additional History of Present Condition:  Patient states rash started a month ago and is itchy and admits to picking at areas  Using Triamcinolone 0 1% cream once daily since the family doctor preferred to be treated by dermatologist   Patient has had rashes in the past from cat having fleas, and was diagnosed with dermal hypersensitivity reaction according to patient  Has been using triamcinolone for years for that rash and it usually goes away in a day  Reports  that cat is not scratching at this time  Started amlodipine 6 months ago- only new medication change   TSH was checked in April and was within normal limits  Assessment and Plan:  Based on a thorough discussion of this condition and the management approach to it (including a comprehensive discussion of the known risks, side effects and potential benefits of treatment), the patient (family) agrees to implement the following specific plan:     During flares, can continue using the Triamcinolone 0 1% cream twice daily for up to 2 weeks then take a break from using for 1 week  When not flaring, can apply triamcinolone cream to areas prone to rash 1-2 times weekly  Do NOT use on face, armpits, or groin   Moisturizer with Cetaphil cream or CeraVe cream 2 - 3 times daily    Discussed that if no improvement, can try phototherapy 2-3 times weekly  Short luke warm showers or baths  Dove white bar soap  Sensitive laundry detergent and if possible do an extra rinse   No dryer sheets or fabric softener  Try to eliminate fragrances  Creams instead of lotions Eucerin, Aveeno, Cetaphil apply topically two times a day             Scribe Attestation    I,:  Navjot Wynne MA am acting as a scribe while in the presence of the attending physician :       I,:  Rodrigue Wheeler MD personally performed the services described in this documentation    as scribed in my presence :         The patient was seen and discussed with Dr Rodrigue Wheeler  RTC: 6 months or if worsening or no improvement, can be seen sooner       Jackelyn Santos  Dermatology PGY-4 Resident Physician

## 2022-08-29 DIAGNOSIS — F41.9 ANXIETY: ICD-10-CM

## 2022-08-29 RX ORDER — LORAZEPAM 2 MG/1
2 TABLET ORAL EVERY 6 HOURS PRN
Qty: 120 TABLET | Refills: 0 | Status: SHIPPED | OUTPATIENT
Start: 2022-08-29 | End: 2022-10-03 | Stop reason: SDUPTHER

## 2022-08-29 NOTE — TELEPHONE ENCOUNTER
Patient called stating that she needs a renewal of her Ativan  She does not have any  Left    None for today  Thank You Patient/Caregiver provided printed discharge information.

## 2022-09-06 DIAGNOSIS — R11.0 NAUSEA: ICD-10-CM

## 2022-09-06 RX ORDER — ONDANSETRON 4 MG/1
4 TABLET, FILM COATED ORAL EVERY 8 HOURS PRN
Qty: 20 TABLET | Refills: 5 | Status: SHIPPED | OUTPATIENT
Start: 2022-09-06 | End: 2022-09-08 | Stop reason: SDUPTHER

## 2022-09-08 DIAGNOSIS — R11.0 NAUSEA: ICD-10-CM

## 2022-09-08 NOTE — TELEPHONE ENCOUNTER
Patient states pharmacy did not receive the prescription sent to the pharmacy the other day  Asking for this to be resent

## 2022-09-09 RX ORDER — ONDANSETRON 4 MG/1
4 TABLET, FILM COATED ORAL EVERY 8 HOURS PRN
Qty: 20 TABLET | Refills: 5 | Status: SHIPPED | OUTPATIENT
Start: 2022-09-09 | End: 2022-10-04 | Stop reason: SDUPTHER

## 2022-09-15 ENCOUNTER — OFFICE VISIT (OUTPATIENT)
Dept: PODIATRY | Facility: CLINIC | Age: 62
End: 2022-09-15
Payer: MEDICARE

## 2022-09-15 VITALS
WEIGHT: 177.2 LBS | HEART RATE: 101 BPM | BODY MASS INDEX: 30.25 KG/M2 | SYSTOLIC BLOOD PRESSURE: 145 MMHG | DIASTOLIC BLOOD PRESSURE: 83 MMHG | HEIGHT: 64 IN

## 2022-09-15 DIAGNOSIS — L97.529: ICD-10-CM

## 2022-09-15 DIAGNOSIS — M06.9 RHEUMATOID ARTHRITIS INVOLVING BOTH FEET, UNSPECIFIED WHETHER RHEUMATOID FACTOR PRESENT (HCC): ICD-10-CM

## 2022-09-15 DIAGNOSIS — L08.9 INFECTION OF TOE: Primary | ICD-10-CM

## 2022-09-15 DIAGNOSIS — M20.42 HAMMER TOE OF LEFT FOOT: ICD-10-CM

## 2022-09-15 PROCEDURE — 99214 OFFICE O/P EST MOD 30 MIN: CPT | Performed by: PODIATRIST

## 2022-09-15 RX ORDER — CEPHALEXIN 500 MG/1
500 CAPSULE ORAL EVERY 6 HOURS SCHEDULED
Qty: 40 CAPSULE | Refills: 0 | Status: SHIPPED | OUTPATIENT
Start: 2022-09-15 | End: 2022-09-25

## 2022-09-15 NOTE — PROGRESS NOTES
Assessment/Plan:    I personally reviewed x-rays of the left foot and specifically the toes  Severe hammertoe deformities noted  Unable to assess whether osteomyelitis is present  Probed ulcer 4th toe  It is questionable as to whether it reaches bone  Explained to patient that MRI would be needed to determine whether osteomyelitis is present  Before ordering such a test, patient placed on cephalexin 500 mg q i d  For 10 days and Bactroban ointment b i d  She will be reassessed in 2 weeks  Patient may need amputation of toe in the future if there is a bone infection  Also dispensed surgical shoe to relieve pressure on the toe  No problem-specific Assessment & Plan notes found for this encounter  Diagnoses and all orders for this visit:    Infection of toe  -     Cancel: X-ray toe left 2+ views; Future  -     XR toe left fourth min 2 views; Future  -     cephalexin (KEFLEX) 500 mg capsule; Take 1 capsule (500 mg total) by mouth every 6 (six) hours for 10 days    Rheumatoid arthritis involving both feet, unspecified whether rheumatoid factor present (HCC)    Hammer toe of left foot    Ulcer of toe, chronic, left, with unspecified severity (HCC)  -     cephalexin (KEFLEX) 500 mg capsule; Take 1 capsule (500 mg total) by mouth every 6 (six) hours for 10 days  -     mupirocin (BACTROBAN) 2 % ointment; Apply topically 2 (two) times a day          Subjective:      Patient ID: Rochelle Coles is a 58 y o  female  HPI     Patient, a 44-year-old female with rheumatoid arthritis presents with concern regarding her left 4th toe  This digit is very red and there is an ulceration on the top of the toe  Patient states that the toe has been symptomatic for months  In the past, patient had a left 5th toe amputation performed due to infection  Patient has severe foot deformity due to the rheumatoid arthritis  All lesser toes are severe hammertoe deformities  Also severe hallux valgus noted      The following portions of the patient's history were reviewed and updated as appropriate: allergies, current medications, past family history, past medical history, past social history, past surgical history and problem list     Review of Systems   Gastrointestinal:        History of upper GI bleed   Musculoskeletal: Positive for arthralgias  Neurological:        Radiculopathy             Objective:      /83   Pulse 101   Ht 5' 4" (1 626 m)   Wt 80 4 kg (177 lb 3 2 oz)   BMI 30 42 kg/m²          Physical Exam  Cardiovascular:      Pulses: Normal pulses  Musculoskeletal:         General: Swelling and deformity present  Comments: Severe hallux valgus deformity bilateral   All lesser toes are hammertoe deformities  Left 4th toe is erythematous with a small ulceration at the dorsum of the toe at the DIPJ  No purulent drainage noted  No forefoot cellulitis present  Skin:     Comments: Ulcer present measuring 0 3 cm on the dorsum of the left 4th toe at the DIPJ  No purulent drainage  Neurological:      General: No focal deficit present  Mental Status: She is oriented to person, place, and time

## 2022-09-21 ENCOUNTER — TELEPHONE (OUTPATIENT)
Dept: INTERNAL MEDICINE CLINIC | Facility: CLINIC | Age: 62
End: 2022-09-21

## 2022-09-21 ENCOUNTER — TELEMEDICINE (OUTPATIENT)
Dept: INTERNAL MEDICINE CLINIC | Facility: CLINIC | Age: 62
End: 2022-09-21
Payer: MEDICARE

## 2022-09-21 DIAGNOSIS — R05.9 COUGH: Primary | ICD-10-CM

## 2022-09-21 PROCEDURE — U0005 INFEC AGEN DETEC AMPLI PROBE: HCPCS | Performed by: INTERNAL MEDICINE

## 2022-09-21 PROCEDURE — 99213 OFFICE O/P EST LOW 20 MIN: CPT | Performed by: INTERNAL MEDICINE

## 2022-09-21 PROCEDURE — U0003 INFECTIOUS AGENT DETECTION BY NUCLEIC ACID (DNA OR RNA); SEVERE ACUTE RESPIRATORY SYNDROME CORONAVIRUS 2 (SARS-COV-2) (CORONAVIRUS DISEASE [COVID-19]), AMPLIFIED PROBE TECHNIQUE, MAKING USE OF HIGH THROUGHPUT TECHNOLOGIES AS DESCRIBED BY CMS-2020-01-R: HCPCS | Performed by: INTERNAL MEDICINE

## 2022-09-21 NOTE — PROGRESS NOTES
COVID-19 Outpatient Progress Note    Assessment/Plan:    Problem List Items Addressed This Visit        Other    Cough - Primary     I recommend checking for COVID         Relevant Orders    COVID Only - Office Collect         Disposition:     I have spent 20 minutes directly with the patient  Greater than 50% of this time was spent in counseling/coordination of care regarding: risks and benefits of treatment options, instructions for management, patient and family education, risk factor reductions and impressions  Encounter provider: Maci Craig MD     Provider located at: [address] [place] [phone]     Recent Visits  No visits were found meeting these conditions  Showing recent visits within past 7 days and meeting all other requirements  Today's Visits  Date Type Provider Dept   09/21/22 Telephone Denton Eloy, [facility]   09/21/22 Telemedicine Maci Craig MD [facility] today's visits and meeting all other requirements  Future Appointments  No visits were found meeting these conditions  Showing future appointments within next 150 days and meeting all other requirements       Patient agrees to participate in a virtual check in via telephone or video visit instead of presenting to the office to address urgent/immediate medical needs  Patient is aware this is a billable service  She acknowledged consent and understanding of privacy and security of the video platform  The patient has agreed to participate and understands they can discontinue the visit at any time  After connecting through [facility], the patient was identified by name and date of birth  Antonio Cunningham was informed that this was a telemedicine visit and that the exam was being conducted confidentially over secure lines   Antonio Cunningham acknowledged consent and understanding of privacy and security of the telemedicine visit  I informed the patient that I have reviewed her record in Epic and presented the opportunity for her to ask any questions regarding the visit today  The patient agreed to participate  Verification of patient location:  Patient is located in the following state in which I hold an active license: [place]    Subjective:   Anu Shook is a 58 y o  female who is concerned about COVID-19  Patient's symptoms include nasal congestion, cough and headache  Patient denies fever, chills, sore throat, shortness of breath, chest tightness, abdominal pain, nausea, vomiting and diarrhea  - Date of symptom onset: 9/19/2022      COVID-19 vaccination status: Fully vaccinated with booster    Tried calling multiple times during the day, kept getting voicemail, but voicemail not available for this patient    Lab Results   Component Value Date    SARSCOV2 Negative 07/11/2022    SARSCOV2 Negative 01/23/2021    [address] [facility] Not Detected 05/19/2020       Review of Systems   Constitutional: Negative for chills and fever  HENT: Positive for congestion  Negative for sore throat  Respiratory: Positive for cough  Negative for chest tightness and shortness of breath  Gastrointestinal: Negative for abdominal pain, diarrhea, nausea and vomiting  Neurological: Positive for headaches  Current Outpatient Medications on File Prior to Visit   Medication Sig    amLODIPine (NORVASC) 5 mg tablet TAKE 1 TABLET (5 MG TOTAL) BY MOUTH DAILY      ferrous sulfate 325 (65 Fe) mg tablet Take 1 tablet (325 mg total) by mouth 2 (two) times a day with meals    levothyroxine 25 mcg tablet TAKE 1 TABLET BY MOUTH EVERY DAY    LORazepam (ATIVAN) 2 mg tablet Take 1 tablet (2 mg total) by mouth every 6 (six) hours as needed for anxiety    pantoprazole (PROTONIX) 40 mg tablet Take 1 tablet (40 mg total) by mouth 2 (two) times a day    PARoxetine (PAXIL) 30 mg tablet Take 2 tablets (60mg total) by mouth daily    QUEtiapine (SEROquel XR) 400 mg 24 hr tablet Take 1 tablet (400 mg total) by mouth daily at bedtime    sucralfate (CARAFATE) 1 g tablet Take 1 tablet (1 g total) by mouth 3 (three) times a day with meals    ziprasidone (GEODON) 80 mg capsule TAKE 1 CAPSULE BY MOUTH EVERY DAY    acetaminophen (TYLENOL) 325 mg tablet Take 3 tablets (975 mg total) by mouth every 8 (eight) hours    cephalexin (KEFLEX) 500 mg capsule Take 1 capsule (500 mg total) by mouth every 6 (six) hours for 10 days    mupirocin (BACTROBAN) 2 % ointment Apply topically 2 (two) times a day    ondansetron (ZOFRAN) 4 mg tablet Take 1 tablet (4 mg total) by mouth every 8 (eight) hours as needed for nausea or vomiting    triamcinolone (KENALOG) 0 1 % cream For acute flares, apply topically twice daily for up to 2 weeks at a time and then take one week off  DO NOT use on the face, armpits, and groin area  When not flaring, can use 1-2 times weekly on areas prone to rash   [DISCONTINUED] pantoprazole (PROTONIX) 40 mg tablet TAKE 1 TABLET BY MOUTH TWICE A DAY       Objective: There were no vitals taken for this visit  Physical Exam  Pulmonary:      Effort: Pulmonary effort is normal  No respiratory distress  Neurological:      Mental Status: She is alert         Usman Mathews MD

## 2022-09-21 NOTE — PATIENT INSTRUCTIONS
Problem List Items Addressed This Visit          Other    Cough - Primary     I recommend checking for COVID           Relevant Orders    COVID Only - Office Collect

## 2022-09-22 DIAGNOSIS — R05.9 COUGH: Primary | ICD-10-CM

## 2022-09-22 LAB — SARS-COV-2 RNA RESP QL NAA+PROBE: NEGATIVE

## 2022-09-23 DIAGNOSIS — R21 RASH: ICD-10-CM

## 2022-09-23 RX ORDER — TRIAMCINOLONE ACETONIDE 1 MG/G
CREAM TOPICAL
Qty: 80 G | Refills: 3 | Status: SHIPPED | OUTPATIENT
Start: 2022-09-23

## 2022-09-24 DIAGNOSIS — K22.10 EROSIVE ESOPHAGITIS: ICD-10-CM

## 2022-09-24 RX ORDER — SUCRALFATE 1 G/1
1 TABLET ORAL
Qty: 90 TABLET | Refills: 3 | Status: SHIPPED | OUTPATIENT
Start: 2022-09-24

## 2022-09-29 ENCOUNTER — OFFICE VISIT (OUTPATIENT)
Dept: PODIATRY | Facility: CLINIC | Age: 62
End: 2022-09-29
Payer: MEDICARE

## 2022-09-29 VITALS
HEART RATE: 106 BPM | BODY MASS INDEX: 30.42 KG/M2 | DIASTOLIC BLOOD PRESSURE: 84 MMHG | HEIGHT: 64 IN | SYSTOLIC BLOOD PRESSURE: 133 MMHG

## 2022-09-29 DIAGNOSIS — M20.42 HAMMER TOE OF LEFT FOOT: ICD-10-CM

## 2022-09-29 DIAGNOSIS — L97.529: ICD-10-CM

## 2022-09-29 DIAGNOSIS — L08.9 INFECTION OF TOE: Primary | ICD-10-CM

## 2022-09-29 DIAGNOSIS — M06.9 RHEUMATOID ARTHRITIS INVOLVING BOTH FEET, UNSPECIFIED WHETHER RHEUMATOID FACTOR PRESENT (HCC): ICD-10-CM

## 2022-09-29 PROCEDURE — 97597 DBRDMT OPN WND 1ST 20 CM/<: CPT | Performed by: PODIATRIST

## 2022-09-29 NOTE — PROGRESS NOTES
Patient presents for assessment of left 4th toe  Digit remains erythematous an ulcer persists at DIPJ  Erythema also present but decreased over last visit  Pain in toe also decreased as surgical shoe has been very helpful  Partial-thickness debridement ulceration left 4th toe  Slight purulence drainage present  Bacitracin dressing applied  Treatment:  Patient referred for an MRI to rule out osteomyelitis  To continue with surgical shoe and Bactroban ointment  She will be rescheduled in 2 weeks

## 2022-10-03 DIAGNOSIS — F41.9 ANXIETY: ICD-10-CM

## 2022-10-03 RX ORDER — LORAZEPAM 2 MG/1
2 TABLET ORAL EVERY 6 HOURS PRN
Qty: 120 TABLET | Refills: 1 | Status: SHIPPED | OUTPATIENT
Start: 2022-10-03

## 2022-10-04 DIAGNOSIS — R11.0 NAUSEA: ICD-10-CM

## 2022-10-04 RX ORDER — ONDANSETRON 4 MG/1
4 TABLET, FILM COATED ORAL EVERY 8 HOURS PRN
Qty: 20 TABLET | Refills: 5 | Status: SHIPPED | OUTPATIENT
Start: 2022-10-04 | End: 2022-10-21 | Stop reason: SDUPTHER

## 2022-10-05 ENCOUNTER — TELEMEDICINE (OUTPATIENT)
Dept: INTERNAL MEDICINE CLINIC | Facility: CLINIC | Age: 62
End: 2022-10-05
Payer: MEDICARE

## 2022-10-05 DIAGNOSIS — R05.2 SUBACUTE COUGH: Primary | ICD-10-CM

## 2022-10-05 PROCEDURE — 99213 OFFICE O/P EST LOW 20 MIN: CPT | Performed by: INTERNAL MEDICINE

## 2022-10-05 NOTE — ASSESSMENT & PLAN NOTE
COVID test was negative September 21st   No hemoptysis, no pleuritic pain  No ankle swelling, no calf pain or swelling  Patient denies any significant heartburn  Discussed that people can get a cough from reflux which she does have  Will also check chest x-ray    Patient was recently on cephalexin for a toe infection

## 2022-10-05 NOTE — PATIENT INSTRUCTIONS
Problem List Items Addressed This Visit          Other    Cough - Primary     COVID test was negative September 21st   No hemoptysis, no pleuritic pain  No ankle swelling, no calf pain or swelling  Patient denies any significant heartburn  Discussed that people can get a cough from reflux which she does have  Will also check chest x-ray    Patient was recently on cephalexin for a toe infection           Relevant Orders    XR chest pa & lateral

## 2022-10-05 NOTE — PROGRESS NOTES
COVID-19 Outpatient Progress Note    Assessment/Plan:    Problem List Items Addressed This Visit        Other    Cough - Primary     COVID test was negative September 21st   No hemoptysis, no pleuritic pain  No ankle swelling, no calf pain or swelling  Patient denies any significant heartburn  Discussed that people can get a cough from reflux which she does have  Will also check chest x-ray  Patient was recently on cephalexin for a toe infection         Relevant Orders    XR chest pa & lateral         Disposition:     I have spent 20 minutes directly with the patient  Greater than 50% of this time was spent in counseling/coordination of care regarding: diagnostic results, instructions for management, patient and family education and impressions  Encounter provider: Marielle Adan MD     Provider located at: [address] [place] [id number]     Recent Visits  No visits were found meeting these conditions  Showing recent visits within past 7 days and meeting all other requirements  Today's Visits  Date Type Provider Dept   10/05/22 Telemedicine Marielle Adan MD [facility] [facility] today's visits and meeting all other requirements  Future Appointments  No visits were found meeting these conditions  Showing future appointments within next 150 days and meeting all other requirements       Patient agrees to participate in a virtual check in via telephone or video visit instead of presenting to the office to address urgent/immediate medical needs  Patient is aware this is a billable service  She acknowledged consent and understanding of privacy and security of the video platform  The patient has agreed to participate and understands they can discontinue the visit at any time  After connecting through [facility], the patient was identified by name and date of birth   Maryam Lopez was informed that this was a telemedicine visit and that the exam was being conducted confidentially over secure lines  My office door was closed  No one else was in the room  Rochelle Coles acknowledged consent and understanding of privacy and security of the telemedicine visit  I informed the patient that I have reviewed her record in Epic and presented the opportunity for her to ask any questions regarding the visit today  The patient agreed to participate  Verification of patient location:  Patient is located in the following state in which I hold an active license: [place]    Subjective:   Rochelle Coles is a 58 y o  female who is concerned about COVID-19  Patient's symptoms include fatigue, malaise, nasal congestion, rhinorrhea, cough, shortness of breath (mild when coughing), nausea and vomiting  Patient denies fever, chills, sore throat, chest tightness, abdominal pain, diarrhea, myalgias and headaches  - Date of symptom onset: 9/21/2022      COVID-19 vaccination status: Fully vaccinated with booster    Lab Results   Component Value Date    SARSCOV2 Negative 09/21/2022    SARSCOV2 Negative 01/23/2021    Ashlie Hobson Not Detected 05/19/2020       Review of Systems   Constitutional: Positive for fatigue  Negative for chills and fever  HENT: Positive for congestion and rhinorrhea  Negative for sore throat  Respiratory: Positive for cough and shortness of breath (mild when coughing)  Negative for chest tightness  Gastrointestinal: Positive for nausea and vomiting  Negative for abdominal pain and diarrhea  Musculoskeletal: Negative for myalgias  Neurological: Negative for headaches  Current Outpatient Medications on File Prior to Visit   Medication Sig    acetaminophen (TYLENOL) 325 mg tablet Take 3 tablets (975 mg total) by mouth every 8 (eight) hours    amLODIPine (NORVASC) 5 mg tablet TAKE 1 TABLET (5 MG TOTAL) BY MOUTH DAILY      ferrous sulfate 325 (65 Fe) mg tablet Take 1 tablet (325 mg total) by mouth 2 (two) times a day with meals    levothyroxine 25 mcg tablet TAKE 1 TABLET BY MOUTH EVERY DAY    LORazepam (ATIVAN) 2 mg tablet Take 1 tablet (2 mg total) by mouth every 6 (six) hours as needed for anxiety    ondansetron (ZOFRAN) 4 mg tablet Take 1 tablet (4 mg total) by mouth every 8 (eight) hours as needed for nausea or vomiting    pantoprazole (PROTONIX) 40 mg tablet Take 1 tablet (40 mg total) by mouth 2 (two) times a day    PARoxetine (PAXIL) 30 mg tablet Take 2 tablets (60mg total) by mouth daily    QUEtiapine (SEROquel XR) 400 mg 24 hr tablet Take 1 tablet (400 mg total) by mouth daily at bedtime    sucralfate (CARAFATE) 1 g tablet TAKE 1 TABLET (1 G TOTAL) BY MOUTH 3 (THREE) TIMES A DAY WITH MEALS    ziprasidone (GEODON) 80 mg capsule TAKE 1 CAPSULE BY MOUTH EVERY DAY    mupirocin (BACTROBAN) 2 % ointment Apply topically 2 (two) times a day    triamcinolone (KENALOG) 0 1 % cream For acute flares, apply topically twice daily for up to 2 weeks at a time and then take one week off  DO NOT use on the face, armpits, and groin area  When not flaring, can use 1-2 times weekly on areas prone to rash  Objective: There were no vitals taken for this visit  Physical Exam  Constitutional:       General: She is not in acute distress  Pulmonary:      Effort: Pulmonary effort is normal  No respiratory distress  Neurological:      Mental Status: She is alert         Lashay Watkins MD

## 2022-10-10 NOTE — TELEPHONE ENCOUNTER
FYI Posterior Auricular Interpolation Flap Text: A decision was made to reconstruct the defect utilizing an interpolation axial flap and a staged reconstruction.  A telfa template was made of the defect.  This telfa template was then used to outline the posterior auricular interpolation flap.  The donor area for the pedicle flap was then injected with anesthesia.  The flap was excised through the skin and subcutaneous tissue down to the layer of the underlying musculature.  The pedicle flap was carefully excised within this deep plane to maintain its blood supply.  The edges of the donor site were undermined.   The donor site was closed in a primary fashion.  The pedicle was then rotated into position and sutured.  Once the tube was sutured into place, adequate blood supply was confirmed with blanching and refill.  The pedicle was then wrapped with xeroform gauze and dressed appropriately with a telfa and gauze bandage to ensure continued blood supply and protect the attached pedicle.

## 2022-10-12 PROBLEM — Z00.00 MEDICARE ANNUAL WELLNESS VISIT, SUBSEQUENT: Status: RESOLVED | Noted: 2020-04-29 | Resolved: 2022-10-12

## 2022-10-15 ENCOUNTER — HOSPITAL ENCOUNTER (EMERGENCY)
Facility: HOSPITAL | Age: 62
Discharge: HOME | End: 2022-10-15
Attending: EMERGENCY MEDICINE
Payer: MEDICARE

## 2022-10-15 VITALS
RESPIRATION RATE: 16 BRPM | OXYGEN SATURATION: 94 % | DIASTOLIC BLOOD PRESSURE: 65 MMHG | SYSTOLIC BLOOD PRESSURE: 146 MMHG | HEART RATE: 88 BPM | TEMPERATURE: 97.8 F

## 2022-10-15 DIAGNOSIS — R11.2 NAUSEA AND VOMITING: Primary | ICD-10-CM

## 2022-10-15 DIAGNOSIS — K29.70 GASTRITIS: ICD-10-CM

## 2022-10-15 LAB
ALBUMIN SERPL BCP-MCNC: 3.3 G/DL (ref 3.5–5)
ALP SERPL-CCNC: 92 U/L (ref 46–116)
ALT SERPL W P-5'-P-CCNC: 21 U/L (ref 12–78)
ANION GAP SERPL CALCULATED.3IONS-SCNC: 6 MMOL/L (ref 4–13)
AST SERPL W P-5'-P-CCNC: 17 U/L (ref 5–45)
BASOPHILS # BLD AUTO: 0.04 THOUSANDS/ÂΜL (ref 0–0.1)
BASOPHILS NFR BLD AUTO: 1 % (ref 0–1)
BILIRUB SERPL-MCNC: 0.29 MG/DL (ref 0.2–1)
BUN SERPL-MCNC: 6 MG/DL (ref 5–25)
CALCIUM ALBUM COR SERPL-MCNC: 9.6 MG/DL (ref 8.3–10.1)
CALCIUM SERPL-MCNC: 9 MG/DL (ref 8.3–10.1)
CHLORIDE SERPL-SCNC: 103 MMOL/L (ref 96–108)
CO2 SERPL-SCNC: 29 MMOL/L (ref 21–32)
CREAT SERPL-MCNC: 0.64 MG/DL (ref 0.6–1.3)
EOSINOPHIL # BLD AUTO: 0.11 THOUSAND/ÂΜL (ref 0–0.61)
EOSINOPHIL NFR BLD AUTO: 2 % (ref 0–6)
ERYTHROCYTE [DISTWIDTH] IN BLOOD BY AUTOMATED COUNT: 19.9 % (ref 11.6–15.1)
GFR SERPL CREATININE-BSD FRML MDRD: 95 ML/MIN/1.73SQ M
GLUCOSE SERPL-MCNC: 97 MG/DL (ref 65–140)
HCT VFR BLD AUTO: 25.7 % (ref 34.8–46.1)
HGB BLD-MCNC: 7 G/DL (ref 11.5–15.4)
IMM GRANULOCYTES # BLD AUTO: 0.01 THOUSAND/UL (ref 0–0.2)
IMM GRANULOCYTES NFR BLD AUTO: 0 % (ref 0–2)
LIPASE SERPL-CCNC: 73 U/L (ref 73–393)
LYMPHOCYTES # BLD AUTO: 0.75 THOUSANDS/ÂΜL (ref 0.6–4.47)
LYMPHOCYTES NFR BLD AUTO: 10 % (ref 14–44)
MCH RBC QN AUTO: 16.3 PG (ref 26.8–34.3)
MCHC RBC AUTO-ENTMCNC: 27.2 G/DL (ref 31.4–37.4)
MCV RBC AUTO: 60 FL (ref 82–98)
MONOCYTES # BLD AUTO: 0.49 THOUSAND/ÂΜL (ref 0.17–1.22)
MONOCYTES NFR BLD AUTO: 7 % (ref 4–12)
NEUTROPHILS # BLD AUTO: 5.82 THOUSANDS/ÂΜL (ref 1.85–7.62)
NEUTS SEG NFR BLD AUTO: 80 % (ref 43–75)
NRBC BLD AUTO-RTO: 0 /100 WBCS
PLATELET # BLD AUTO: 274 THOUSANDS/UL (ref 149–390)
PMV BLD AUTO: 8.5 FL (ref 8.9–12.7)
POTASSIUM SERPL-SCNC: 3.5 MMOL/L (ref 3.5–5.3)
PROT SERPL-MCNC: 7.3 G/DL (ref 6.4–8.4)
RBC # BLD AUTO: 4.29 MILLION/UL (ref 3.81–5.12)
SODIUM SERPL-SCNC: 138 MMOL/L (ref 135–147)
WBC # BLD AUTO: 7.22 THOUSAND/UL (ref 4.31–10.16)

## 2022-10-15 PROCEDURE — 76705 ECHO EXAM OF ABDOMEN: CPT | Performed by: EMERGENCY MEDICINE

## 2022-10-15 PROCEDURE — 96361 HYDRATE IV INFUSION ADD-ON: CPT

## 2022-10-15 PROCEDURE — 96374 THER/PROPH/DIAG INJ IV PUSH: CPT

## 2022-10-15 PROCEDURE — 83690 ASSAY OF LIPASE: CPT

## 2022-10-15 PROCEDURE — 85025 COMPLETE CBC W/AUTO DIFF WBC: CPT

## 2022-10-15 PROCEDURE — 80053 COMPREHEN METABOLIC PANEL: CPT

## 2022-10-15 PROCEDURE — 99284 EMERGENCY DEPT VISIT MOD MDM: CPT

## 2022-10-15 PROCEDURE — 36415 COLL VENOUS BLD VENIPUNCTURE: CPT

## 2022-10-15 PROCEDURE — 99284 EMERGENCY DEPT VISIT MOD MDM: CPT | Performed by: EMERGENCY MEDICINE

## 2022-10-15 RX ORDER — METOCLOPRAMIDE 10 MG/1
10 TABLET ORAL EVERY 6 HOURS
Qty: 28 TABLET | Refills: 0 | Status: SHIPPED | OUTPATIENT
Start: 2022-10-15 | End: 2022-10-22

## 2022-10-15 RX ORDER — ALUMINA, MAGNESIA, AND SIMETHICONE 2400; 2400; 240 MG/30ML; MG/30ML; MG/30ML
5 SUSPENSION ORAL EVERY 6 HOURS PRN
Qty: 140 ML | Refills: 0 | Status: SHIPPED | OUTPATIENT
Start: 2022-10-15 | End: 2022-10-22

## 2022-10-15 RX ORDER — METOCLOPRAMIDE HYDROCHLORIDE 5 MG/ML
10 INJECTION INTRAMUSCULAR; INTRAVENOUS ONCE
Status: COMPLETED | OUTPATIENT
Start: 2022-10-15 | End: 2022-10-15

## 2022-10-15 RX ORDER — SUCRALFATE 1 G/1
1 TABLET ORAL ONCE
Status: COMPLETED | OUTPATIENT
Start: 2022-10-15 | End: 2022-10-15

## 2022-10-15 RX ORDER — MAGNESIUM HYDROXIDE/ALUMINUM HYDROXICE/SIMETHICONE 120; 1200; 1200 MG/30ML; MG/30ML; MG/30ML
30 SUSPENSION ORAL ONCE
Status: COMPLETED | OUTPATIENT
Start: 2022-10-15 | End: 2022-10-15

## 2022-10-15 RX ADMIN — ALUMINUM HYDROXIDE, MAGNESIUM HYDROXIDE, AND SIMETHICONE 30 ML: 200; 200; 20 SUSPENSION ORAL at 10:33

## 2022-10-15 RX ADMIN — SUCRALFATE 1 G: 1 TABLET ORAL at 10:33

## 2022-10-15 RX ADMIN — METOCLOPRAMIDE 10 MG: 5 INJECTION, SOLUTION INTRAMUSCULAR; INTRAVENOUS at 10:33

## 2022-10-15 RX ADMIN — SODIUM CHLORIDE 500 ML: 0.9 INJECTION, SOLUTION INTRAVENOUS at 10:36

## 2022-10-15 NOTE — ED PROVIDER NOTES
History  Chief Complaint   Patient presents with   • Personal Problem     Pt brought in via EMS stating that her only current complaint is "My mouth is dry"     Patient is a 80-year-old female with a significant past medical history of hiatal hernia, esophagitis, schizoaffective disorder, currently presenting with a chief complaint of vomiting  She states that she has had over 10 episodes of vomiting since last night  These have been nonbloody in nature  She does not associate any abdominal pain with her symptoms  She states that there is no provoking factors  She did take some Zofran, most recently at approximately 7:00 a m  with minimal to moderate relief  She has had episodes like this in the past, and she has been evaluated, but has never received a definitive diagnosis  She denies any urinary symptoms  She denies any changes to her bowel movements, blood in her stools or melena  She denies any fever chills  She denies any sick contacts or recent illness  She was in her normal state of health prior to this event, and does not have any recent changes to medications  She has been taking of medications as prescribed  She has any chest pain or difficulty breathing  She denies any drug use or marijuana use  She denies any alcohol use  She states that her symptoms have since resolved and she is back to her baseline  Prior to Admission Medications   Prescriptions Last Dose Informant Patient Reported? Taking?    LORazepam (ATIVAN) 2 mg tablet   No No   Sig: Take 1 tablet (2 mg total) by mouth every 6 (six) hours as needed for anxiety   PARoxetine (PAXIL) 30 mg tablet   No No   Sig: Take 2 tablets (60mg total) by mouth daily   QUEtiapine (SEROquel XR) 400 mg 24 hr tablet   No No   Sig: Take 1 tablet (400 mg total) by mouth daily at bedtime   acetaminophen (TYLENOL) 325 mg tablet  Self No No   Sig: Take 3 tablets (975 mg total) by mouth every 8 (eight) hours   amLODIPine (NORVASC) 5 mg tablet   No No   Sig: TAKE 1 TABLET (5 MG TOTAL) BY MOUTH DAILY  ferrous sulfate 325 (65 Fe) mg tablet   No No   Sig: Take 1 tablet (325 mg total) by mouth 2 (two) times a day with meals   levothyroxine 25 mcg tablet   No No   Sig: TAKE 1 TABLET BY MOUTH EVERY DAY   mupirocin (BACTROBAN) 2 % ointment   No No   Sig: Apply topically 2 (two) times a day   ondansetron (ZOFRAN) 4 mg tablet   No No   Sig: Take 1 tablet (4 mg total) by mouth every 8 (eight) hours as needed for nausea or vomiting   pantoprazole (PROTONIX) 40 mg tablet   No No   Sig: Take 1 tablet (40 mg total) by mouth 2 (two) times a day   sucralfate (CARAFATE) 1 g tablet   No No   Sig: TAKE 1 TABLET (1 G TOTAL) BY MOUTH 3 (THREE) TIMES A DAY WITH MEALS   triamcinolone (KENALOG) 0 1 % cream   No No   Sig: For acute flares, apply topically twice daily for up to 2 weeks at a time and then take one week off  DO NOT use on the face, armpits, and groin area  When not flaring, can use 1-2 times weekly on areas prone to rash     ziprasidone (GEODON) 80 mg capsule   No No   Sig: TAKE 1 CAPSULE BY MOUTH EVERY DAY      Facility-Administered Medications: None       Past Medical History:   Diagnosis Date   • Anxiety    • Arthritis    • Back pain at L4-L5 level    • Schreiber esophagus    • Bulimia    • Colon polyp    • Disease of thyroid gland     hypothyroid   • Ear problems    • GERD (gastroesophageal reflux disease)    • History of transfusion    • Hyperlipidemia    • Hypothyroidism    • Leukopenia    • Nasal congestion    • NMS (neuroleptic malignant syndrome) 01/03/2020   • Pneumonia    • Rheumatoid arthritis involving right hip (HCC)    • Sciatica    • Seizure ([facility] [address] )     due to medication mix up 8/2018 only one historically   • Seizures (HCC)    • Synovial cyst of lumbar spine    • Vertigo    • Weight gain        Past Surgical History:   Procedure Laterality Date   • BONE MARROW BIOPSY     • BREAST SURGERY      enlargement with implants   • CLOSED REDUCTION DISTAL FEMUR FRACTURE     • COLONOSCOPY     • COSMETIC SURGERY     • DENTAL SURGERY     • HIP ARTHROPLASTY Right 1/2/2020    Procedure: REVISION TOTAL HIP ARTHROPLASTY WITH REPAIR OF PARIPROSTHETIC FRACTURE - POSTERIOR APPROACH;  Surgeon: Jillian Wilcox MD;  [facility];  Service: Orthopedics   • KS ESOPHAGOGASTRODUODENOSCOPY TRANSORAL DIAGNOSTIC N/A 5/11/2021    Procedure: ESOPHAGOGASTRODUODENOSCOPY (EGD); Surgeon: Dante Kirby MD;  [facility];  Service: General   • KS ESOPHAGUS SURG PROCEDURE UNLISTED N/A 5/11/2021    Procedure: FUNDOPLICATION TRANSORAL INCISIONLESS;  Surgeon: Gabriele Joshi MD;  [facility];  Service: Gastroenterology   • KS LAP, REPAIR PARAESOPHAGEAL HERNIA, INCL FUNDOPLASTY W/ MESH N/A 5/11/2021    Procedure: REPAIR HERNIA PARAESOPHAGEAL  LAPAROSCOPIC;  Surgeon: Dante Kirby MD;  [facility];  Service: General   • KS TOTAL HIP ARTHROPLASTY Right 12/11/2019    Procedure: ARTHROPLASTY HIP TOTAL ANTERIOR;  Surgeon: Jillian Wilcox MD;  [facility];  Service: Orthopedics   • RHINOPLASTY     • SINUS SURGERY     • TRANSORAL INCISIONLESS FUNDOPLICATION (TIF)  88/64/5596       Family History   Problem Relation Age of Onset   • Uterine cancer Mother    • Esophageal cancer Father    • Colon cancer Maternal Grandmother      I have reviewed and agree with the history as documented  E-Cigarette/Vaping   • E-Cigarette Use Never User      E-Cigarette/Vaping Substances   • Nicotine No    • THC No    • CBD No    • Flavoring No    • Other No    • Unknown No      Social History     Tobacco Use   • Smoking status: Never Smoker   • Smokeless tobacco: Never Used   Vaping Use   • Vaping Use: Never used   Substance Use Topics   • Alcohol use: Not Currently   • Drug use: Not Currently        Review of Systems   Constitutional: Negative for chills and fever  HENT: Negative for sore throat  Eyes: Negative for visual disturbance     Respiratory: Negative for cough and shortness of breath  Cardiovascular: Negative for chest pain  Gastrointestinal: Positive for nausea and vomiting  Negative for abdominal pain, blood in stool, constipation and diarrhea  Genitourinary: Negative for dysuria, vaginal bleeding and vaginal discharge  Musculoskeletal: Negative for back pain and neck pain  Skin: Negative for rash  Neurological: Negative for dizziness, syncope, light-headedness and headaches  Psychiatric/Behavioral: Negative for agitation  All other systems reviewed and are negative  Physical Exam  ED Triage Vitals [10/15/22 1011]   Temperature Pulse Respirations Blood Pressure SpO2   97 8 °F (36 6 °C) 88 18 111/63 95 %      Temp Source Heart Rate Source Patient Position - Orthostatic VS BP Location FiO2 (%)   Oral Monitor Lying Right arm --      Pain Score       No Pain             Orthostatic Vital Signs  Vitals:    10/15/22 1011 10/15/22 1100   BP: 111/63 146/65   Pulse: 88 88   Patient Position - Orthostatic VS: Lying Lying       Physical Exam  Vitals and nursing note reviewed  Constitutional:       General: She is not in acute distress  Appearance: Normal appearance  She is not ill-appearing or toxic-appearing  HENT:      Head: Normocephalic and atraumatic  Right Ear: External ear normal       Left Ear: External ear normal       Nose: Nose normal    Eyes:      General: No scleral icterus  Right eye: No discharge  Left eye: No discharge  Extraocular Movements: Extraocular movements intact  Conjunctiva/sclera: Conjunctivae normal    Cardiovascular:      Rate and Rhythm: Normal rate and regular rhythm  Heart sounds: Normal heart sounds  No murmur heard  No friction rub  No gallop  Pulmonary:      Effort: Pulmonary effort is normal  No respiratory distress  Breath sounds: Normal breath sounds  Abdominal:      General: Abdomen is flat  There is no distension  Palpations: Abdomen is soft  There is no mass  Tenderness: There is abdominal tenderness (minimal) in the epigastric area  There is no right CVA tenderness, left CVA tenderness, guarding or rebound  Negative signs include Lr's sign and McBurney's sign  Genitourinary:     Comments: Deferred  Musculoskeletal:         General: Normal range of motion  Cervical back: Normal range of motion  Skin:     General: Skin is warm and dry  Neurological:      General: No focal deficit present  Mental Status: She is alert     Psychiatric:         Mood and Affect: Mood normal          ED Medications  Medications   metoclopramide (REGLAN) injection 10 mg (10 mg Intravenous Given 10/15/22 1033)   sucralfate (CARAFATE) tablet 1 g (1 g Oral Given 10/15/22 1033)   aluminum-magnesium hydroxide-simethicone (MYLANTA) oral suspension 30 mL (30 mL Oral Given 10/15/22 1033)   sodium chloride 0 9 % bolus 500 mL (0 mL Intravenous Stopped 10/15/22 1113)       Diagnostic Studies  Results Reviewed     Procedure Component Value Units Date/Time    Comprehensive metabolic panel [[id number]]  (Abnormal) Collected: 10/15/22 1036    Lab Status: Final result Specimen: Blood from Hand, Left Updated: 10/15/22 1107     Sodium 138 mmol/L      Potassium 3 5 mmol/L      Chloride 103 mmol/L      CO2 29 mmol/L      ANION GAP 6 mmol/L      BUN 6 mg/dL      Creatinine 0 64 mg/dL      Glucose 97 mg/dL      Calcium 9 0 mg/dL      Corrected Calcium 9 6 mg/dL      AST 17 U/L      ALT 21 U/L      Alkaline Phosphatase 92 U/L      Total Protein 7 3 g/dL      Albumin 3 3 g/dL      Total Bilirubin 0 29 mg/dL      eGFR 95 ml/min/1 73sq m     Narrative:      [place] guidelines for Chronic Kidney Disease (CKD):   •  Stage 1 with normal or high GFR (GFR > 90 mL/min/1 73 square meters)  •  Stage 2 Mild CKD (GFR = 60-89 mL/min/1 73 square meters)  •  Stage 3A Moderate CKD (GFR = 45-59 mL/min/1 73 square meters)  •  Stage 3B Moderate CKD (GFR = 30-44 mL/min/1 73 square meters)  • Stage 4 Severe CKD (GFR = 15-29 mL/min/1 73 square meters)  •  Stage 5 End Stage CKD (GFR <15 mL/min/1 73 square meters)  Note: GFR calculation is accurate only with a steady state creatinine    Lipase [[id number]]  (Normal) Collected: 10/15/22 1036    Lab Status: Final result Specimen: Blood from Hand, Left Updated: 10/15/22 1107     Lipase 73 u/L     CBC and differential [[id number]]  (Abnormal) Collected: 10/15/22 1036    Lab Status: Final result Specimen: Blood from Hand, Left Updated: 10/15/22 1049     WBC 7 22 Thousand/uL      RBC 4 29 Million/uL      Hemoglobin 7 0 g/dL      Hematocrit 25 7 %      MCV 60 fL      MCH 16 3 pg      MCHC 27 2 g/dL      RDW 19 9 %      MPV 8 5 fL      Platelets 603 Thousands/uL      nRBC 0 /100 WBCs      Neutrophils Relative 80 %      Immat GRANS % 0 %      Lymphocytes Relative 10 %      Monocytes Relative 7 %      Eosinophils Relative 2 %      Basophils Relative 1 %      Neutrophils Absolute 5 82 Thousands/µL      Immature Grans Absolute 0 01 Thousand/uL      Lymphocytes Absolute 0 75 Thousands/µL      Monocytes Absolute 0 49 Thousand/µL      Eosinophils Absolute 0 11 Thousand/µL      Basophils Absolute 0 04 Thousands/µL                  No orders to display         Procedures  POC Biliary US    Date/Time: 10/15/2022 10:57 AM  Performed by: Kenneth Rebolledo DO  Authorized by: Kenneth Rebolledo DO     Patient location:  ED  Performed by:  Resident  Other Assisting Provider: No    Procedure details:     Exam Type:  Diagnostic    Indications: epigastric pain      Assessment for:  Cholecystitis and cholelithiasis    Views obtained: gallbladder (transverse and longitudinal), liver and portal triad      Image quality: limited diagnostic      Image availability:  Images available in PACS  Findings:     Cholelithiasis: not identified      Common bile duct:  Unable to visualize    Gallbladder wall:  Normal    Pericholecystic fluid: not identified      Sonographic Lr's sign: negative      Polyps: not identified      Mass: not identified    Interpretation:     Biliary ultrasound impressions: cholelithiasis    Comments:      Stones visualized, able to visualize portal triad, not able to see CBD  No pericholecystic fluid seen  ED Course                             SBIRT 20yo+    Flowsheet Row Most Recent Value   SBIRT (23 yo +)    In order to provide better care to our patients, we are screening all of our patients for alcohol and drug use  Would it be okay to ask you these screening questions? No Filed at: 10/15/2022 1015                MDM  Number of Diagnoses or Management Options  Gastritis: new and requires workup  Nausea and vomiting: new and requires workup  Diagnosis management comments: Patient is a 58year old female presenting with nausea and vomiting  Differential diagnosis includes gastritis, esophagitis, GERD, pancreatitis, cholecystitis  Abdominal exam without peritoneal signs  No evidence of acute abdomen at this time  Well appearing  Low suspicion for acute hepatobiliary disease (includng acute cholecystitis, normal ultrasound), acute pancreatitis, perforated ulcer, acute infectious processes (pneumonia, hepatitis, pyelonephritis), atypical appendicitis, vascular catastrophe, or bowel obstruction  Presentation not consistent with other acute, emergent causes of abdominal pain at this time  Plan: labs, GI cocktail, RUQ US, serial reassessment    Patient RUQ with stone but without pericholecystic fluid, CBD not visualized  No sonographic rodriguez's sign  Labs with hemoglobin of 7 0, which appears near or slightly below baseline (has had multiple previous hemoglobins in the 7's documented in the past)  Confirmed with patient that she has not had any blood/darkening of her vomit, blood in stool or melena and she confirmed this, stating that vomit was only food contents   As the patient is currently asymptomatic, remaining labs are unremarkable, and she is tolerating PO intake in the ED, recommend discharge home with repeat CBC outpatient to be performed on Monday, as well as close primary care follow up in next 2-3 days and information to follow up with GI  Gave prescriptions for maalox and regalan  Patient seems to understand this plan and is agreeable  All questions answered  Patient discharged home with return precautions  Amount and/or Complexity of Data Reviewed  Clinical lab tests: ordered and reviewed  Review and summarize past medical records: yes    Patient Progress  Patient progress: improved      Disposition  Final diagnoses:   Nausea and vomiting   Gastritis     Time reflects when diagnosis was documented in both MDM as applicable and the Disposition within this note     Time User Action Codes Description Comment    10/15/2022 11:23 AM Fadi Poke Add [R11 2] Nausea and vomiting     10/15/2022 11:23 AM Fadi Poke Add [K29 70] Gastritis       ED Disposition     ED Disposition   Discharge    Condition   Stable    Date/Time   Sat Oct 15, 2022 11:23 AM    Comment   Tamica Olivares discharge to home/self care                 Follow-up Information     Follow up With Specialties Details Why Contact Info Additional Information    Blayne Joel MD Internal Medicine Go in 2 days  [address] Leigha Con[address] [facility]   [address] [place] [id number]  [facility] [address],  [address] [place] [address], [address]          Discharge Medication List as of 10/15/2022 11:55 AM      START taking these medications    Details   aluminum-magnesium hydroxide-simethicone (MAALOX MAX) [phone] MG/5ML suspension Take 5 mL by mouth every 6 (six) hours as needed for indigestion or heartburn for up to 7 days, Starting Sat 10/15/2022, Until Sat 10/22/2022 at 2359, Normal metoclopramide (Reglan) 10 mg tablet Take 1 tablet (10 mg total) by mouth every 6 (six) hours for 7 days, Starting Sat 10/15/2022, Until Sat 10/22/2022, Normal         CONTINUE these medications which have NOT CHANGED    Details   acetaminophen (TYLENOL) 325 mg tablet Take 3 tablets (975 mg total) by mouth every 8 (eight) hours, Starting Fri 5/14/2021, Normal      amLODIPine (NORVASC) 5 mg tablet TAKE 1 TABLET (5 MG TOTAL) BY MOUTH DAILY  , Starting Sun 7/10/2022, Normal      ferrous sulfate 325 (65 Fe) mg tablet Take 1 tablet (325 mg total) by mouth 2 (two) times a day with meals, Starting Wed 1/12/2022, Normal      levothyroxine 25 mcg tablet TAKE 1 TABLET BY MOUTH EVERY DAY, Normal      LORazepam (ATIVAN) 2 mg tablet Take 1 tablet (2 mg total) by mouth every 6 (six) hours as needed for anxiety, Starting Mon 10/3/2022, Normal      mupirocin (BACTROBAN) 2 % ointment Apply topically 2 (two) times a day, Starting Thu 9/15/2022, Normal      ondansetron (ZOFRAN) 4 mg tablet Take 1 tablet (4 mg total) by mouth every 8 (eight) hours as needed for nausea or vomiting, Starting Tue 10/4/2022, Normal      pantoprazole (PROTONIX) 40 mg tablet Take 1 tablet (40 mg total) by mouth 2 (two) times a day, Starting Wed 7/20/2022, Normal      PARoxetine (PAXIL) 30 mg tablet Take 2 tablets (60mg total) by mouth daily, Normal      QUEtiapine (SEROquel XR) 400 mg 24 hr tablet Take 1 tablet (400 mg total) by mouth daily at bedtime, Starting Mon 2/14/2022, Normal      sucralfate (CARAFATE) 1 g tablet TAKE 1 TABLET (1 G TOTAL) BY MOUTH 3 (THREE) TIMES A DAY WITH MEALS, Starting Sat 9/24/2022, Normal      triamcinolone (KENALOG) 0 1 % cream For acute flares, apply topically twice daily for up to 2 weeks at a time and then take one week off  DO NOT use on the face, armpits, and groin area   When not flaring, can use 1-2 times weekly on areas prone to rash , Normal      ziprasidone (GEODON) 80 mg capsule TAKE 1 CAPSULE BY MOUTH EVERY DAY, Normal           Outpatient Discharge Orders   CBC and differential   Standing Status: Future Standing Exp  Date: 10/15/23       PDMP Review       Value Time User    PDMP Reviewed  Yes 10/3/2022  9:37 AM Janet Mendoza MD           ED Provider  Attending physically available and evaluated Corinne Timmons I managed the patient along with the ED Attending      Electronically Signed by         Weston Moya DO  10/15/22 1937

## 2022-10-15 NOTE — DISCHARGE INSTRUCTIONS
You were evaluated in the Emergency Department today for abdominal pain  Your evaluation did not show evidence of medical conditions requiring emergent intervention at this time, and we feel safe discharging you  Please schedule an appointment with your primary care physician within the next 2-3 days  I also gave you information to follow up with a GI doctor  Please do this next week  You hemoglobin was low  Please get this rechecked  I ordered an outpatient lab order for you  Please have this done on Monday  I wrote you prescriptions for regalan and maalox  This is at your pharmacy  Please take as prescribed  Return to the Emergency Department if you experience worsening or uncontrolled pain, fevers 100 4°F or greater, recurrent vomiting, inability to tolerate food or fluids by mouth, bloody stools or vomit, black or tarry stools, or any other concerning symptoms  Thank you for choosing us for your care

## 2022-10-15 NOTE — ANESTHESIA PROCEDURE NOTES
Arterial Line Insertion  Performed by: Justin Price CRNA  Authorized by: Tu Gillespie MD   Consent: Verbal consent obtained  Written consent obtained  Risks and benefits: risks, benefits and alternatives were discussed  Consent given by: patient  Patient understanding: patient states understanding of the procedure being performed  Patient consent: the patient's understanding of the procedure matches consent given  Procedure consent: procedure consent matches procedure scheduled  Relevant documents: relevant documents present and verified  Site marked: the operative site was marked  Patient identity confirmed: verbally with patient and arm band  Time out: Immediately prior to procedure a "time out" was called to verify the correct patient, procedure, equipment, support staff and site/side marked as required  Preparation: Patient was prepped and draped in the usual sterile fashion    Indications: hemodynamic monitoring  Orientation:  Right  Location: radial artery  Sedation:  Patient sedated: yes  Analgesia: see MAR for details    Procedure Details:  Feng's test normal: yes  Needle gauge: 20  Number of attempts: 1    Post-procedure:  Post-procedure: dressing applied  Waveform: waveform confirmed  Post-procedure CNS: normal and unchanged
good balance

## 2022-10-15 NOTE — ED ATTENDING ATTESTATION
10/15/2022  I, Raymon Camilo MD, saw and evaluated the patient  I have discussed the patient with the resident/non-physician practitioner and agree with the resident's/non-physician practitioner's findings, Plan of Care, and MDM as documented in the resident's/non-physician practitioner's note, except where noted  All available labs and Radiology studies were reviewed  I was present for key portions of any procedure(s) performed by the resident/non-physician practitioner and I was immediately available to provide assistance  At this point I agree with the current assessment done in the Emergency Department  I have conducted an independent evaluation of this patient a history and physical is as follows:    ED Course         Critical Care Time  Procedures    59 yo female with hx of schizophrenia, anxiety, htn, hld, gerd, hypothyroidism, here for n/v and dry mouth  Pt with hx of same and some relief with zofran she took at home  Pt with no abdominal pain, no diarrhea  No sick contacts  No urinary symptoms, no fever  No cp, no sob  Vss, afebrile, lungs cta, rrr, abdomen soft mild tenderness, no rebound, no guarding  Labs, ivf, reglan, gi cocktail

## 2022-10-17 ENCOUNTER — TELEPHONE (OUTPATIENT)
Dept: PODIATRY | Facility: CLINIC | Age: 62
End: 2022-10-17

## 2022-10-17 NOTE — TELEPHONE ENCOUNTER
Caller: Patient    Doctor: Megan Damon / Mc    Reason for call: Needs to reschedule MRI    Call back#: [phone]

## 2022-10-17 NOTE — TELEPHONE ENCOUNTER
Spoke with patient  Gave her the number for central scheduling ([phone]) to call to reschedule MRI  Patient thanked me for this information and no other questions at this time

## 2022-10-21 ENCOUNTER — TELEMEDICINE (OUTPATIENT)
Dept: INTERNAL MEDICINE CLINIC | Facility: CLINIC | Age: 62
End: 2022-10-21
Payer: MEDICARE

## 2022-10-21 DIAGNOSIS — Z12.31 ENCOUNTER FOR SCREENING MAMMOGRAM FOR BREAST CANCER: ICD-10-CM

## 2022-10-21 DIAGNOSIS — R11.2 NAUSEA AND VOMITING, UNSPECIFIED VOMITING TYPE: ICD-10-CM

## 2022-10-21 DIAGNOSIS — Z12.4 SCREENING FOR CERVICAL CANCER: ICD-10-CM

## 2022-10-21 DIAGNOSIS — D50.0 IRON DEFICIENCY ANEMIA DUE TO CHRONIC BLOOD LOSS: Primary | ICD-10-CM

## 2022-10-21 DIAGNOSIS — R11.0 NAUSEA: ICD-10-CM

## 2022-10-21 PROCEDURE — 99213 OFFICE O/P EST LOW 20 MIN: CPT | Performed by: INTERNAL MEDICINE

## 2022-10-21 RX ORDER — ONDANSETRON 4 MG/1
4 TABLET, FILM COATED ORAL EVERY 8 HOURS PRN
Qty: 20 TABLET | Refills: 5 | Status: SHIPPED | OUTPATIENT
Start: 2022-10-21

## 2022-10-21 NOTE — PATIENT INSTRUCTIONS
Problem List Items Addressed This Visit          Digestive    Nausea and vomiting     Continue Zofran as needed, and Reglan as needed              Other    Iron deficiency anemia due to acute on chronic blood loss - Primary     Patient denies any acute bleeding, will recheck iron studies and CBC, patient getting worsened symptoms, then may need to return to the hospital for transfusion           Relevant Orders    CBC and differential    Iron, TIBC and Ferritin Panel          Other Visit Diagnoses       Screening for cervical cancer        Encounter for screening mammogram for breast cancer        Relevant Orders    Mammo screening bilateral w 3d & cad    Nausea        Relevant Medications    ondansetron (ZOFRAN) 4 mg tablet

## 2022-10-21 NOTE — PROGRESS NOTES
Virtual Regular Visit    Verification of patient location:    Patient is located in the following state in which I hold an active license [place]      Assessment/Plan:    Problem List Items Addressed This Visit        Digestive    Nausea and vomiting     Continue Zofran as needed, and Reglan as needed            Other    Iron deficiency anemia due to acute on chronic blood loss - Primary     Patient denies any acute bleeding, will recheck iron studies and CBC, patient getting worsened symptoms, then may need to return to the hospital for transfusion         Relevant Orders    CBC and differential    Iron, TIBC and Ferritin Panel      Other Visit Diagnoses     Screening for cervical cancer        Encounter for screening mammogram for breast cancer        Relevant Orders    Mammo screening bilateral w 3d & cad    Nausea        Relevant Medications    ondansetron (ZOFRAN) 4 mg tablet               Reason for visit is   Chief Complaint   Patient presents with   • Virtual Regular Visit        Encounter provider Cinthia Valera MD    Provider located at [address] [place], [place] [id number]      Recent Visits  No visits were found meeting these conditions  Showing recent visits within past 7 days and meeting all other requirements  Today's Visits  Date Type Provider Dept   10/21/22 Telemedicine Cinthia Valera MD [address] [place] [address] today's visits and meeting all other requirements  Future Appointments  No visits were found meeting these conditions  Showing future appointments within next 150 days and meeting all other requirements       The patient was identified by name and date of birth  Eli Mello was informed that this is a telemedicine visit and that the visit is being conducted through other and patient was informed that this is not a secure platform She agrees to proceed     My office door was closed  No one else was in the room    She acknowledged consent and understanding of privacy and security of the video platform  The patient has agreed to participate and understands they can discontinue the visit at any time  Patient is aware this is a billable service  Subjective  Hussein Leone is a 58 y o  female    Pt was in the ED for vomiting  Labs reviewed, kidney and liver function tests were normal   Hemoglobin was low at 7  Pt admits to fatigue  Pt denies bleeding, no black tarry stool  She is taking iron  No coffee ground emesis  Vomited one time yesterday  Past Medical History:   Diagnosis Date   • Anxiety    • Arthritis    • Back pain at L4-L5 level    • Schreiber esophagus    • Bulimia    • Colon polyp    • Disease of thyroid gland     hypothyroid   • Ear problems    • GERD (gastroesophageal reflux disease)    • History of transfusion    • Hyperlipidemia    • Hypothyroidism    • Leukopenia    • Nasal congestion    • NMS (neuroleptic malignant syndrome) 01/03/2020   • Pneumonia    • Rheumatoid arthritis involving right hip (HCC)    • Sciatica    • Seizure ([address] )     due to medication mix up 8/2018 only one historically   • Seizures ([address] )    • Synovial cyst of lumbar spine    • Vertigo    • Weight gain        Past Surgical History:   Procedure Laterality Date   • BONE MARROW BIOPSY     • BREAST SURGERY      enlargement with implants   • CLOSED REDUCTION DISTAL FEMUR FRACTURE     • COLONOSCOPY     • COSMETIC SURGERY     • DENTAL SURGERY     • HIP ARTHROPLASTY Right 1/2/2020    Procedure: REVISION TOTAL HIP ARTHROPLASTY WITH REPAIR OF PARIPROSTHETIC FRACTURE - POSTERIOR APPROACH;  Surgeon: Santos Johnson MD;  [facility];  Service: Orthopedics   • [place] ESOPHAGOGASTRODUODENOSCOPY TRANSORAL DIAGNOSTIC N/A 5/11/2021    Procedure: ESOPHAGOGASTRODUODENOSCOPY (EGD); Surgeon:  Oly Rossi MD;  [facility];  Service: General   • [place] ESOPHAGUS SURG PROCEDURE UNLISTED N/A 5/11/2021    Procedure: FUNDOPLICATION TRANSORAL INCISIONLESS;  Surgeon: Hermilo Camara MD;  [facility];  Service: Gastroenterology   • [place] LAP, REPAIR PARAESOPHAGEAL HERNIA, INCL FUNDOPLASTY W/ MESH N/A 5/11/2021    Procedure: REPAIR HERNIA PARAESOPHAGEAL  LAPAROSCOPIC;  Surgeon: Cain Sykes MD;  [facility];  Service: General   • [place] TOTAL HIP ARTHROPLASTY Right 12/11/2019    Procedure: ARTHROPLASTY HIP TOTAL ANTERIOR;  Surgeon: Vince Benítez MD;  [facility];  Service: Orthopedics   • RHINOPLASTY     • SINUS SURGERY     • TRANSORAL INCISIONLESS FUNDOPLICATION (TIF)  06/84/6462       Current Outpatient Medications   Medication Sig Dispense Refill   • acetaminophen (TYLENOL) 325 mg tablet Take 3 tablets (975 mg total) by mouth every 8 (eight) hours 30 tablet 0   • aluminum-magnesium hydroxide-simethicone (MAALOX MAX) 400-400-40 MG/5ML suspension Take 5 mL by mouth every 6 (six) hours as needed for indigestion or heartburn for up to 7 days 140 mL 0   • amLODIPine (NORVASC) 5 mg tablet TAKE 1 TABLET (5 MG TOTAL) BY MOUTH DAILY   90 tablet 1   • ferrous sulfate 325 (65 Fe) mg tablet Take 1 tablet (325 mg total) by mouth 2 (two) times a day with meals 60 tablet 0   • levothyroxine 25 mcg tablet TAKE 1 TABLET BY MOUTH EVERY DAY 90 tablet 3   • LORazepam (ATIVAN) 2 mg tablet Take 1 tablet (2 mg total) by mouth every 6 (six) hours as needed for anxiety 120 tablet 1   • metoclopramide (Reglan) 10 mg tablet Take 1 tablet (10 mg total) by mouth every 6 (six) hours for 7 days 28 tablet 0   • ondansetron (ZOFRAN) 4 mg tablet Take 1 tablet (4 mg total) by mouth every 8 (eight) hours as needed for nausea or vomiting 20 tablet 5   • pantoprazole (PROTONIX) 40 mg tablet Take 1 tablet (40 mg total) by mouth 2 (two) times a day 60 tablet 5   • PARoxetine (PAXIL) 30 mg tablet Take 2 tablets (60mg total) by mouth daily 180 tablet 3   • QUEtiapine (SEROquel XR) 400 mg 24 hr tablet Take 1 tablet (400 mg total) by mouth daily at bedtime 90 tablet 3   • sucralfate (CARAFATE) 1 g tablet TAKE 1 TABLET (1 G TOTAL) BY MOUTH 3 (THREE) TIMES A DAY WITH MEALS 90 tablet 3   • triamcinolone (KENALOG) 0 1 % cream For acute flares, apply topically twice daily for up to 2 weeks at a time and then take one week off  DO NOT use on the face, armpits, and groin area  When not flaring, can use 1-2 times weekly on areas prone to rash  80 g 3   • ziprasidone (GEODON) 80 mg capsule TAKE 1 CAPSULE BY MOUTH EVERY DAY 90 capsule 3   • mupirocin (BACTROBAN) 2 % ointment Apply topically 2 (two) times a day 22 g 0     No current facility-administered medications for this visit  Allergies   Allergen Reactions   • Risperdal [Risperidone] Shortness Of Breath     Rapid heart beat, SOB   • Zyprexa [Olanzapine] Shortness Of Breath     Rapid heartbeat   • Latex Rash     Band aids, adhesives wears normal underwear, can eat bananas  USE PAPER TAPE       Review of Systems   Constitutional: Positive for fatigue  Negative for chills and fever  HENT: Negative for congestion, nosebleeds, postnasal drip, sore throat and trouble swallowing  Eyes: Negative for pain  Respiratory: Negative for cough, chest tightness, shortness of breath and wheezing  Cardiovascular: Negative for chest pain, palpitations and leg swelling  Gastrointestinal: Positive for nausea and vomiting  Negative for abdominal pain, blood in stool, constipation and diarrhea  Endocrine: Negative for polydipsia and polyuria  Genitourinary: Negative for dysuria, flank pain and hematuria  Musculoskeletal: Negative for arthralgias  Skin: Negative for rash  Neurological: Negative for dizziness, tremors and headaches  Hematological: Does not bruise/bleed easily  Psychiatric/Behavioral: Negative for confusion and dysphoric mood  The patient is not nervous/anxious  Video Exam    There were no vitals filed for this visit      Physical Exam  Constitutional:       General: She is not in acute distress  Pulmonary:      Effort: Pulmonary effort is normal  No respiratory distress  Neurological:      Mental Status: She is alert            I spent 20 minutes with patient today in which greater than 50% of the time was spent in counseling/coordination of care regarding acute and chronic issues

## 2022-10-21 NOTE — ASSESSMENT & PLAN NOTE
Patient denies any acute bleeding, will recheck iron studies and CBC, patient getting worsened symptoms, then may need to return to the hospital for transfusion

## 2022-11-01 ENCOUNTER — OFFICE VISIT (OUTPATIENT)
Dept: GASTROENTEROLOGY | Facility: CLINIC | Age: 62
End: 2022-11-01

## 2022-11-01 VITALS
SYSTOLIC BLOOD PRESSURE: 129 MMHG | HEIGHT: 64 IN | BODY MASS INDEX: 31.38 KG/M2 | HEART RATE: 94 BPM | WEIGHT: 183.8 LBS | DIASTOLIC BLOOD PRESSURE: 80 MMHG

## 2022-11-01 DIAGNOSIS — R11.14 BILIOUS VOMITING WITH NAUSEA: Primary | ICD-10-CM

## 2022-11-01 DIAGNOSIS — K22.70 BARRETT'S ESOPHAGUS WITHOUT DYSPLASIA: ICD-10-CM

## 2022-11-01 DIAGNOSIS — K22.11 ULCER OF ESOPHAGUS WITH BLEEDING: ICD-10-CM

## 2022-11-01 DIAGNOSIS — K44.9 HIATAL HERNIA: ICD-10-CM

## 2022-11-01 DIAGNOSIS — K21.9 GASTROESOPHAGEAL REFLUX DISEASE WITHOUT ESOPHAGITIS: ICD-10-CM

## 2022-11-01 DIAGNOSIS — F50.2 BULIMIA NERVOSA: ICD-10-CM

## 2022-11-01 NOTE — PATIENT INSTRUCTIONS
Scheduled date of EGD(as of today): 12/12/22  Physician performing EGD: DR Marquis Jean  Location of EGD: [facility]  Instructions reviewed with patient by: Lety NIEVES  Clearances:  N/A

## 2022-11-01 NOTE — PROGRESS NOTES
[facility] - Outpatient Follow-up Note  Maggie Stark 58 y o  female MRN: [id number]  Encounter: [id number]          ASSESSMENT AND PLAN:      1  Bilious vomiting with nausea  Patient with intractable nausea and vomiting, frequent hospitalization with upper GI bleed, last endoscopy was with Dr Randell Jules in May 2022 which shows esophageal ulcer, reflux esophagitis, Schreiber esophagus and recurrence of small hiatal hernia, she is taking pantoprazole once a day along with Carafate, she was also put on Reglan and Zofran, will do repeat EGD to document healing of the ulcer, she is status post hiatal hernia repair along with TIF last year, will evaluate gastroesophageal junction if there is recurrence of hiatal hernia then will talk to Dr Claudia Engel  She also has a history of multiple psychiatric problem including bulimia nervosa, she sometimes being changed and then she constantly throw up  Advised to follow-up with the psychiatrist also, I doubt her reflux symptoms has any correlation with intractable nausea and vomiting  - EGD; Future    2  Gastroesophageal reflux disease without esophagitis   increase pantoprazole dose to twice a day, continue with Zofran and Reglan, waiting for gastric emptying study, will repeat EGD to document healing of the ulcer  - EGD; Future    3  Schreiber's esophagus without dysplasia  Nondysplastic long segment Schreiber esophagus  - EGD; Future    4  Ulcer of esophagus with bleeding  History of recurrent upper GI bleed, currently denies any dysphagia, no odynophagia or GI bleed  - EGD; Future    5  Hiatal hernia  Recurrence of small hiatal hernia, she is status post hiatal hernia repaired along with TIF last year    6   Bulimia nervosa  History of eating disorder, history of schizophrenia, currently on Seroquel, Paxil, continue follow-up with psychiatrist    ______________________________________________________________________    SUBJECTIVE:  Patient seen and examined, she come for follow-up, she is status post hiatal hernia repair along with TIF last year in April 2021, postprocedure she did not follow any dietary recommendation, she had multiple hospitalization at [address] with intractable nausea, vomiting and upper GI bleeding and found to have esophageal ulcer, reflux esophagitis and last endoscopy shows recurrence of small hiatal hernia  She has a history of bulimia, history of eating disorder and frequent nausea and vomiting, she binge eat and then she throw up, I do not thing her reflux symptoms and intractable nausea and vomiting has any correlation, last endoscopy result was reviewed, biopsy result was reviewed with the patient, advised her to continue with pantoprazole, continue with Reglan and Zofran      REVIEW OF SYSTEMS IS OTHERWISE NEGATIVE        Historical Information   Past Medical History:   Diagnosis Date   • Anxiety    • Arthritis    • Back pain at L4-L5 level    • Schreiber esophagus    • Bulimia    • Colon polyp    • Disease of thyroid gland     hypothyroid   • Ear problems    • GERD (gastroesophageal reflux disease)    • History of transfusion    • Hyperlipidemia    • Hypothyroidism    • Leukopenia    • Nasal congestion    • NMS (neuroleptic malignant syndrome) 01/03/2020   • Pneumonia    • Rheumatoid arthritis involving right hip (HCC)    • Sciatica    • Seizure ([address] )     due to medication mix up 8/2018 only one historically   • Seizures ([address] )    • Synovial cyst of lumbar spine    • Vertigo    • Weight gain      Past Surgical History:   Procedure Laterality Date   • BONE MARROW BIOPSY     • BREAST SURGERY      enlargement with implants   • CLOSED REDUCTION DISTAL FEMUR FRACTURE     • COLONOSCOPY     • COSMETIC SURGERY     • DENTAL SURGERY     • HIP ARTHROPLASTY Right 1/2/2020    Procedure: REVISION TOTAL HIP ARTHROPLASTY WITH REPAIR OF PARIPROSTHETIC FRACTURE - POSTERIOR APPROACH;  Surgeon: Martha Haque MD;  [facility];  Service: Orthopedics   • [place] ESOPHAGOGASTRODUODENOSCOPY TRANSORAL DIAGNOSTIC N/A 5/11/2021    Procedure: ESOPHAGOGASTRODUODENOSCOPY (EGD); Surgeon: Karry Hatchet, MD;  [facility];  Service: General   • [place] ESOPHAGUS SURG PROCEDURE UNLISTED N/A 5/11/2021    Procedure: FUNDOPLICATION TRANSORAL INCISIONLESS;  Surgeon: Ariana Hernandez MD;  [facility];  Service: Gastroenterology   • [place] LAP, REPAIR PARAESOPHAGEAL HERNIA, INCL FUNDOPLASTY W/ MESH N/A 5/11/2021    Procedure: REPAIR HERNIA PARAESOPHAGEAL  LAPAROSCOPIC;  Surgeon:  Karry Hatchet, MD;  [facility];  Service: General   • [place] TOTAL HIP ARTHROPLASTY Right 12/11/2019    Procedure: ARTHROPLASTY HIP TOTAL ANTERIOR;  Surgeon: Tejinder Barnes MD;  [facility];  Service: Orthopedics   • RHINOPLASTY     • SINUS SURGERY     • TRANSORAL INCISIONLESS FUNDOPLICATION (TIF)  43/10/5930     Social History   Social History     Substance and Sexual Activity   Alcohol Use Not Currently     Social History     Substance and Sexual Activity   Drug Use Not Currently     Social History     Tobacco Use   Smoking Status Never Smoker   Smokeless Tobacco Never Used     Family History   Problem Relation Age of Onset   • Uterine cancer Mother    • Esophageal cancer Father    • Colon cancer Maternal Grandmother        Meds/Allergies       Current Outpatient Medications:   •  acetaminophen (TYLENOL) 325 mg tablet  •  amLODIPine (NORVASC) 5 mg tablet  •  ferrous sulfate 325 (65 Fe) mg tablet  •  levothyroxine 25 mcg tablet  •  LORazepam (ATIVAN) 2 mg tablet  •  mupirocin (BACTROBAN) 2 % ointment  •  ondansetron (ZOFRAN) 4 mg tablet  •  pantoprazole (PROTONIX) 40 mg tablet  •  PARoxetine (PAXIL) 30 mg tablet  •  QUEtiapine (SEROquel XR) 400 mg 24 hr tablet  •  sucralfate (CARAFATE) 1 g tablet  •  triamcinolone (KENALOG) 0 1 % cream  •  ziprasidone (GEODON) 80 mg capsule    Allergies   Allergen Reactions   • Risperdal [Risperidone] Shortness Of Breath Rapid heart beat, SOB   • Zyprexa [Olanzapine] Shortness Of Breath     Rapid heartbeat   • Latex Rash     Band aids, adhesives wears normal underwear, can eat bananas  USE PAPER TAPE           Objective     Blood pressure 129/80, pulse 94, height 5' 4" (1 626 m), weight 83 4 kg (183 lb 12 8 oz), not currently breastfeeding  Body mass index is 31 55 kg/m²  PHYSICAL EXAM:      General Appearance:   Alert, cooperative, no distress   HEENT:   Normocephalic, atraumatic, anicteric      Neck:  Supple, symmetrical, trachea midline   Lungs:   Clear to auscultation bilaterally; no rales, rhonchi or wheezing; respirations unlabored    Heart[de-identified]   Regular rate and rhythm; no murmur, rub, or gallop  Abdomen:   Soft, non-tender, non-distended; normal bowel sounds; no masses, no organomegaly    Genitalia:   Deferred    Rectal:   Deferred    Extremities:  No cyanosis, clubbing or edema    Pulses:  2+ and symmetric    Skin:  No jaundice, rashes, or lesions    Lymph nodes:  No palpable cervical lymphadenopathy        Lab Results:   No visits with results within 1 Day(s) from this visit     Latest known visit with results is:   Admission on 10/15/2022, Discharged on 10/15/2022   Component Date Value   • WBC 10/15/2022 7 22    • RBC 10/15/2022 4 29    • Hemoglobin 10/15/2022 7 0 (A)   • Hematocrit 10/15/2022 25 7 (A)   • MCV 10/15/2022 60 (A)   • MCH 10/15/2022 16 3 (A)   • MCHC 10/15/2022 27 2 (A)   • RDW 10/15/2022 19 9 (A)   • MPV 10/15/2022 8 5 (A)   • Platelets 45/50/6579 274    • nRBC 10/15/2022 0    • Neutrophils Relative 10/15/2022 80 (A)   • Immat GRANS % 10/15/2022 0    • Lymphocytes Relative 10/15/2022 10 (A)   • Monocytes Relative 10/15/2022 7    • Eosinophils Relative 10/15/2022 2    • Basophils Relative 10/15/2022 1    • Neutrophils Absolute 10/15/2022 5 82    • Immature Grans Absolute 10/15/2022 0 01    • Lymphocytes Absolute 10/15/2022 0 75    • Monocytes Absolute 10/15/2022 0 49    • Eosinophils Absolute 10/15/2022 0 11    • Basophils Absolute 10/15/2022 0 04    • Sodium 10/15/2022 138    • Potassium 10/15/2022 3 5    • Chloride 10/15/2022 103    • CO2 10/15/2022 29    • ANION GAP 10/15/2022 6    • BUN 10/15/2022 6    • Creatinine 10/15/2022 0 64    • Glucose 10/15/2022 97    • Calcium 10/15/2022 9 0    • Corrected Calcium 10/15/2022 9 6    • AST 10/15/2022 17    • ALT 10/15/2022 21    • Alkaline Phosphatase 10/15/2022 92    • Total Protein 10/15/2022 7 3    • Albumin 10/15/2022 3 3 (A)   • Total Bilirubin 10/15/2022 0 29    • eGFR 10/15/2022 95    • Lipase 10/15/2022 73          Radiology Results:   US bedside procedure    Result Date: 10/15/2022  Narrative: 1 2 [id number]  2 446 [id number] 1

## 2022-11-15 ENCOUNTER — TELEPHONE (OUTPATIENT)
Dept: GASTROENTEROLOGY | Facility: CLINIC | Age: 62
End: 2022-11-15

## 2022-11-15 ENCOUNTER — TELEPHONE (OUTPATIENT)
Dept: OTHER | Facility: OTHER | Age: 62
End: 2022-11-15

## 2022-11-15 LAB
BASOPHILS # BLD AUTO: 51 CELLS/UL (ref 0–200)
BASOPHILS NFR BLD AUTO: 1.1 %
EOSINOPHIL # BLD AUTO: 133 CELLS/UL (ref 15–500)
EOSINOPHIL NFR BLD AUTO: 2.9 %
ERYTHROCYTE [DISTWIDTH] IN BLOOD BY AUTOMATED COUNT: 19.8 % (ref 11–15)
FERRITIN SERPL-MCNC: 3 NG/ML (ref 16–288)
HCT VFR BLD AUTO: 23.3 % (ref 35–45)
HGB BLD-MCNC: 6.2 G/DL (ref 11.7–15.5)
IRON SATN MFR SERPL: 3 % (CALC) (ref 16–45)
IRON SERPL-MCNC: 15 MCG/DL (ref 45–160)
LYMPHOCYTES # BLD AUTO: 727 CELLS/UL (ref 850–3900)
LYMPHOCYTES NFR BLD AUTO: 15.8 %
MCH RBC QN AUTO: <15 PG (ref 27–33)
MCHC RBC AUTO-ENTMCNC: 26.6 G/DL (ref 32–36)
MCV RBC AUTO: 55.7 FL (ref 80–100)
MONOCYTES # BLD AUTO: 414 CELLS/UL (ref 200–950)
MONOCYTES NFR BLD AUTO: 9 %
NEUTROPHILS # BLD AUTO: 3275 CELLS/UL (ref 1500–7800)
NEUTROPHILS NFR BLD AUTO: 71.2 %
PLATELET # BLD AUTO: 303 THOUSAND/UL (ref 140–400)
PMV BLD REES-ECKER: 9.5 FL (ref 7.5–12.5)
RBC # BLD AUTO: 4.18 MILLION/UL (ref 3.8–5.1)
SERVICE CMNT-IMP: ABNORMAL
TIBC SERPL-MCNC: 593 MCG/DL (CALC) (ref 250–450)
WBC # BLD AUTO: 4.6 THOUSAND/UL (ref 3.8–10.8)

## 2022-11-15 NOTE — TELEPHONE ENCOUNTER
Patients GI provider:  Dr Madonna Petersen    Number to return call: [phone]     Reason for call: Pt called and stated she has been vomiting every day and doesn't know if she can wait until December to have her EGD please review and reach out to patient thank you     Scheduled procedure/appointment date if applicable:procedure 17/97/2503

## 2022-11-16 ENCOUNTER — HOSPITAL ENCOUNTER (EMERGENCY)
Facility: HOSPITAL | Age: 62
Discharge: HOME | End: 2022-11-16
Attending: EMERGENCY MEDICINE | Admitting: INTERNAL MEDICINE

## 2022-11-16 ENCOUNTER — APPOINTMENT (EMERGENCY)
Dept: RADIOLOGY | Facility: HOSPITAL | Age: 62
End: 2022-11-16

## 2022-11-16 VITALS
HEART RATE: 80 BPM | DIASTOLIC BLOOD PRESSURE: 76 MMHG | SYSTOLIC BLOOD PRESSURE: 135 MMHG | OXYGEN SATURATION: 96 % | RESPIRATION RATE: 16 BRPM | TEMPERATURE: 97.7 F

## 2022-11-16 DIAGNOSIS — D64.9 ANEMIA: Primary | ICD-10-CM

## 2022-11-16 DIAGNOSIS — R11.2 NAUSEA & VOMITING: ICD-10-CM

## 2022-11-16 DIAGNOSIS — R42 DIZZINESS: ICD-10-CM

## 2022-11-16 LAB
ABO GROUP BLD: NORMAL
ALBUMIN SERPL BCP-MCNC: 3 G/DL (ref 3.5–5)
ALP SERPL-CCNC: 85 U/L (ref 46–116)
ALT SERPL W P-5'-P-CCNC: 19 U/L (ref 12–78)
ANION GAP SERPL CALCULATED.3IONS-SCNC: 7 MMOL/L (ref 4–13)
AST SERPL W P-5'-P-CCNC: 11 U/L (ref 5–45)
ATRIAL RATE: 87 BPM
BASOPHILS # BLD AUTO: 0.04 THOUSANDS/ÂΜL (ref 0–0.1)
BASOPHILS NFR BLD AUTO: 1 % (ref 0–1)
BILIRUB SERPL-MCNC: 0.24 MG/DL (ref 0.2–1)
BLD GP AB SCN SERPL QL: NEGATIVE
BUN SERPL-MCNC: 12 MG/DL (ref 5–25)
CALCIUM ALBUM COR SERPL-MCNC: 9.4 MG/DL (ref 8.3–10.1)
CALCIUM SERPL-MCNC: 8.6 MG/DL (ref 8.3–10.1)
CHLORIDE SERPL-SCNC: 101 MMOL/L (ref 96–108)
CO2 SERPL-SCNC: 25 MMOL/L (ref 21–32)
CREAT SERPL-MCNC: 0.62 MG/DL (ref 0.6–1.3)
EOSINOPHIL # BLD AUTO: 0.14 THOUSAND/ÂΜL (ref 0–0.61)
EOSINOPHIL NFR BLD AUTO: 3 % (ref 0–6)
ERYTHROCYTE [DISTWIDTH] IN BLOOD BY AUTOMATED COUNT: 21.7 % (ref 11.6–15.1)
GFR SERPL CREATININE-BSD FRML MDRD: 96 ML/MIN/1.73SQ M
GLUCOSE SERPL-MCNC: 96 MG/DL (ref 65–140)
HCT VFR BLD AUTO: 23.7 % (ref 34.8–46.1)
HGB BLD-MCNC: 6.1 G/DL (ref 11.5–15.4)
IMM GRANULOCYTES # BLD AUTO: 0.02 THOUSAND/UL (ref 0–0.2)
IMM GRANULOCYTES NFR BLD AUTO: 1 % (ref 0–2)
LIPASE SERPL-CCNC: 144 U/L (ref 73–393)
LYMPHOCYTES # BLD AUTO: 0.53 THOUSANDS/ÂΜL (ref 0.6–4.47)
LYMPHOCYTES NFR BLD AUTO: 12 % (ref 14–44)
MCH RBC QN AUTO: 14.5 PG (ref 26.8–34.3)
MCHC RBC AUTO-ENTMCNC: 25.7 G/DL (ref 31.4–37.4)
MCV RBC AUTO: 56 FL (ref 82–98)
MONOCYTES # BLD AUTO: 0.47 THOUSAND/ÂΜL (ref 0.17–1.22)
MONOCYTES NFR BLD AUTO: 11 % (ref 4–12)
NEUTROPHILS # BLD AUTO: 3.14 THOUSANDS/ÂΜL (ref 1.85–7.62)
NEUTS SEG NFR BLD AUTO: 72 % (ref 43–75)
NRBC BLD AUTO-RTO: 1 /100 WBCS
P AXIS: 64 DEGREES
PLATELET # BLD AUTO: 286 THOUSANDS/UL (ref 149–390)
PMV BLD AUTO: 9 FL (ref 8.9–12.7)
POTASSIUM SERPL-SCNC: 3.5 MMOL/L (ref 3.5–5.3)
PR INTERVAL: 128 MS
PROT SERPL-MCNC: 7.2 G/DL (ref 6.4–8.4)
QRS AXIS: 30 DEGREES
QRSD INTERVAL: 78 MS
QT INTERVAL: 352 MS
QTC INTERVAL: 423 MS
RBC # BLD AUTO: 4.2 MILLION/UL (ref 3.81–5.12)
RH BLD: POSITIVE
SODIUM SERPL-SCNC: 133 MMOL/L (ref 135–147)
SPECIMEN EXPIRATION DATE: NORMAL
T WAVE AXIS: 68 DEGREES
VENTRICULAR RATE: 87 BPM
WBC # BLD AUTO: 4.34 THOUSAND/UL (ref 4.31–10.16)

## 2022-11-16 RX ORDER — MAGNESIUM HYDROXIDE/ALUMINUM HYDROXICE/SIMETHICONE 120; 1200; 1200 MG/30ML; MG/30ML; MG/30ML
30 SUSPENSION ORAL ONCE
Status: COMPLETED | OUTPATIENT
Start: 2022-11-16 | End: 2022-11-16

## 2022-11-16 RX ORDER — ONDANSETRON 2 MG/ML
4 INJECTION INTRAMUSCULAR; INTRAVENOUS ONCE
Status: DISCONTINUED | OUTPATIENT
Start: 2022-11-16 | End: 2022-11-16 | Stop reason: HOSPADM

## 2022-11-16 RX ORDER — FAMOTIDINE 20 MG/1
20 TABLET, FILM COATED ORAL ONCE
Status: COMPLETED | OUTPATIENT
Start: 2022-11-16 | End: 2022-11-16

## 2022-11-16 RX ORDER — ONDANSETRON 2 MG/ML
4 INJECTION INTRAMUSCULAR; INTRAVENOUS ONCE
Status: COMPLETED | OUTPATIENT
Start: 2022-11-16 | End: 2022-11-16

## 2022-11-16 RX ORDER — SODIUM CHLORIDE, SODIUM GLUCONATE, SODIUM ACETATE, POTASSIUM CHLORIDE, MAGNESIUM CHLORIDE, SODIUM PHOSPHATE, DIBASIC, AND POTASSIUM PHOSPHATE .53; .5; .37; .037; .03; .012; .00082 G/100ML; G/100ML; G/100ML; G/100ML; G/100ML; G/100ML; G/100ML
1000 INJECTION, SOLUTION INTRAVENOUS ONCE
Status: COMPLETED | OUTPATIENT
Start: 2022-11-16 | End: 2022-11-16

## 2022-11-16 RX ADMIN — IOHEXOL 100 ML: 350 INJECTION, SOLUTION INTRAVENOUS at 09:40

## 2022-11-16 RX ADMIN — ALUMINA, MAGNESIA, AND SIMETHICONE ORAL SUSPENSION REGULAR STRENGTH 30 ML: 1200; 1200; 120 SUSPENSION ORAL at 07:42

## 2022-11-16 RX ADMIN — SODIUM CHLORIDE, SODIUM GLUCONATE, SODIUM ACETATE, POTASSIUM CHLORIDE, MAGNESIUM CHLORIDE, SODIUM PHOSPHATE, DIBASIC, AND POTASSIUM PHOSPHATE 1000 ML: .53; .5; .37; .037; .03; .012; .00082 INJECTION, SOLUTION INTRAVENOUS at 07:44

## 2022-11-16 RX ADMIN — FAMOTIDINE 20 MG: 20 TABLET, FILM COATED ORAL at 07:42

## 2022-11-16 RX ADMIN — ONDANSETRON 4 MG: 2 INJECTION INTRAMUSCULAR; INTRAVENOUS at 07:42

## 2022-11-16 NOTE — ED PROVIDER NOTES
History  Chief Complaint   Patient presents with   • Dizziness     Pt had episode of dizziness that didn't resolve this am when getting out of bed  Pt has current symptoms including vomiting x2 months, infection in L foot "maybe requiring amputation", "low hemoglobin that might need blood", rosas's esophagus  HPI  55-year-old female with past medical history TIF procedure and Rosas's esophagus presents with a complaint nausea vomiting and lightheadedness  Patient states that for months she has been dealing with nausea vomiting constantly due to her advanced occurred  However the lightheadedness seems to be noon started this morning  She has a chest pain, shortness of breath, fevers, chills, abdominal pain, diarrhea  She cannot describe any palliative or exacerbating factors  Her vomitus has been nonbloody  Prior to Admission Medications   Prescriptions Last Dose Informant Patient Reported? Taking? LORazepam (ATIVAN) 2 mg tablet   No No   Sig: Take 1 tablet (2 mg total) by mouth every 6 (six) hours as needed for anxiety   PARoxetine (PAXIL) 30 mg tablet   No No   Sig: Take 2 tablets (60mg total) by mouth daily   QUEtiapine (SEROquel XR) 400 mg 24 hr tablet   No No   Sig: TAKE 1 TABLET (400 MG TOTAL) BY MOUTH DAILY AT BEDTIME   acetaminophen (TYLENOL) 325 mg tablet   No No   Sig: Take 3 tablets (975 mg total) by mouth every 8 (eight) hours   amLODIPine (NORVASC) 5 mg tablet   No No   Sig: TAKE 1 TABLET (5 MG TOTAL) BY MOUTH DAILY     ferrous sulfate 325 (65 Fe) mg tablet   No No   Sig: Take 1 tablet (325 mg total) by mouth 2 (two) times a day with meals   levothyroxine 25 mcg tablet   No No   Sig: TAKE 1 TABLET BY MOUTH EVERY DAY   mupirocin (BACTROBAN) 2 % ointment   No No   Sig: Apply topically 2 (two) times a day   ondansetron (ZOFRAN) 4 mg tablet   No No   Sig: Take 1 tablet (4 mg total) by mouth every 8 (eight) hours as needed for nausea or vomiting   pantoprazole (PROTONIX) 40 mg tablet   No No Sig: Take 1 tablet (40 mg total) by mouth 2 (two) times a day   sucralfate (CARAFATE) 1 g tablet   No No   Sig: TAKE 1 TABLET (1 G TOTAL) BY MOUTH 3 (THREE) TIMES A DAY WITH MEALS   triamcinolone (KENALOG) 0 1 % cream   No No   Sig: For acute flares, apply topically twice daily for up to 2 weeks at a time and then take one week off  DO NOT use on the face, armpits, and groin area  When not flaring, can use 1-2 times weekly on areas prone to rash  ziprasidone (GEODON) 80 mg capsule   No No   Sig: TAKE 1 CAPSULE BY MOUTH EVERY DAY      Facility-Administered Medications: None       Past Medical History:   Diagnosis Date   • Anxiety    • Arthritis    • Back pain at L4-L5 level    • Schreiber esophagus    • Bulimia    • Colon polyp    • Disease of thyroid gland     hypothyroid   • Ear problems    • GERD (gastroesophageal reflux disease)    • History of transfusion    • Hyperlipidemia    • Hypothyroidism    • Leukopenia    • Nasal congestion    • NMS (neuroleptic malignant syndrome) 01/03/2020   • Pneumonia    • Rheumatoid arthritis involving right hip (HCC)    • Sciatica    • Seizure ([address] )     due to medication mix up 8/2018 only one historically   • Seizures (HCC)    • Synovial cyst of lumbar spine    • Vertigo    • Weight gain        Past Surgical History:   Procedure Laterality Date   • BONE MARROW BIOPSY     • BREAST SURGERY      enlargement with implants   • CLOSED REDUCTION DISTAL FEMUR FRACTURE     • COLONOSCOPY     • COSMETIC SURGERY     • DENTAL SURGERY     • HIP ARTHROPLASTY Right 1/2/2020    Procedure: REVISION TOTAL HIP ARTHROPLASTY WITH REPAIR OF PARIPROSTHETIC FRACTURE - POSTERIOR APPROACH;  Surgeon: Oziel Galvan MD;  [facility];  Service: Orthopedics   • PA ESOPHAGOGASTRODUODENOSCOPY TRANSORAL DIAGNOSTIC N/A 5/11/2021    Procedure: ESOPHAGOGASTRODUODENOSCOPY (EGD); Surgeon:  Michelle Henry MD;  [facility];  Service: General   • PA ESOPHAGUS SURG PROCEDURE UNLISTED N/A 5/11/2021 Procedure: FUNDOPLICATION TRANSORAL INCISIONLESS;  Surgeon: Vangie Billy MD;  [facility];  Service: Gastroenterology   • ME LAP, REPAIR PARAESOPHAGEAL HERNIA, INCL FUNDOPLASTY W/ MESH N/A 5/11/2021    Procedure: REPAIR HERNIA PARAESOPHAGEAL  LAPAROSCOPIC;  Surgeon: Tra Ayoub MD;  [facility];  Service: General   • ME TOTAL HIP ARTHROPLASTY Right 12/11/2019    Procedure: ARTHROPLASTY HIP TOTAL ANTERIOR;  Surgeon: Jenna Fermin MD;  [facility];  Service: Orthopedics   • RHINOPLASTY     • SINUS SURGERY     • TRANSORAL INCISIONLESS FUNDOPLICATION (TIF)  66/41/5727       Family History   Problem Relation Age of Onset   • Uterine cancer Mother    • Esophageal cancer Father    • Colon cancer Maternal Grandmother      I have reviewed and agree with the history as documented  E-Cigarette/Vaping   • E-Cigarette Use Never User      E-Cigarette/Vaping Substances   • Nicotine No    • THC No    • CBD No    • Flavoring No    • Other No    • Unknown No      Social History     Tobacco Use   • Smoking status: Never   • Smokeless tobacco: Never   Vaping Use   • Vaping Use: Never used   Substance Use Topics   • Alcohol use: Not Currently   • Drug use: Not Currently        Review of Systems   Constitutional: Negative for chills and fever  HENT: Negative for congestion, ear pain, rhinorrhea and sore throat  Eyes: Negative for pain  Respiratory: Negative for apnea, cough, choking, chest tightness, shortness of breath, wheezing and stridor  Cardiovascular: Negative for chest pain, palpitations and leg swelling  Gastrointestinal: Positive for nausea and vomiting  Negative for abdominal pain, constipation and diarrhea  Genitourinary: Negative for hematuria  Musculoskeletal: Negative for arthralgias and back pain  Skin: Negative for rash and wound  Neurological: Positive for dizziness  Psychiatric/Behavioral: Negative for agitation and hallucinations     All other systems reviewed and are negative  Physical Exam  ED Triage Vitals   Temperature Pulse Respirations Blood Pressure SpO2   11/16/22 0717 11/16/22 0717 11/16/22 0717 11/16/22 0717 11/16/22 0717   98 3 °F (36 8 °C) 85 16 136/75 99 %      Temp Source Heart Rate Source Patient Position - Orthostatic VS BP Location FiO2 (%)   11/16/22 0717 11/16/22 0717 11/16/22 1415 11/16/22 0717 --   Oral Monitor Sitting Left arm       Pain Score       11/16/22 0717       5             Orthostatic Vital Signs  Vitals:    11/16/22 1415 11/16/22 1500 11/16/22 1515 11/16/22 1530   BP: 134/80 137/76 139/76 135/76   Pulse: 82 80 78 80   Patient Position - Orthostatic VS: Sitting Lying Lying Lying       Physical Exam  Vitals reviewed  Constitutional:       General: She is not in acute distress  Appearance: She is well-developed  HENT:      Head: Normocephalic and atraumatic  Right Ear: External ear normal       Left Ear: External ear normal       Nose: Nose normal  No congestion or rhinorrhea  Mouth/Throat:      Mouth: Mucous membranes are moist       Pharynx: No oropharyngeal exudate or posterior oropharyngeal erythema  Eyes:      General:         Right eye: No discharge  Left eye: No discharge  Extraocular Movements: Extraocular movements intact  Conjunctiva/sclera: Conjunctivae normal       Pupils: Pupils are equal, round, and reactive to light  Cardiovascular:      Rate and Rhythm: Normal rate and regular rhythm  Pulses: Normal pulses  Heart sounds: Normal heart sounds  Pulmonary:      Effort: Pulmonary effort is normal  No respiratory distress  Breath sounds: Normal breath sounds  No wheezing or rales  Abdominal:      Palpations: Abdomen is soft  Tenderness: There is no abdominal tenderness  Musculoskeletal:         General: No tenderness  Normal range of motion  Cervical back: Normal range of motion and neck supple  No rigidity  Skin:     General: Skin is warm  Findings: No erythema or rash  Neurological:      General: No focal deficit present  Mental Status: She is alert and oriented to person, place, and time  Cranial Nerves: No cranial nerve deficit  Sensory: No sensory deficit  Motor: No weakness        Coordination: Coordination normal    Psychiatric:         Mood and Affect: Mood normal          ED Medications  Medications   multi-electrolyte (ISOLYTE-S PH 7 4) bolus 1,000 mL (0 mL Intravenous Stopped 11/16/22 0905)   ondansetron (ZOFRAN) injection 4 mg (4 mg Intravenous Given 11/16/22 0742)   aluminum-magnesium hydroxide-simethicone (MYLANTA) oral suspension 30 mL (30 mL Oral Given 11/16/22 0742)   famotidine (PEPCID) tablet 20 mg (20 mg Oral Given 11/16/22 0742)   iohexol (OMNIPAQUE) 350 MG/ML injection (SINGLE-DOSE) 100 mL (100 mL Intravenous Given 11/16/22 0940)       Diagnostic Studies  Results Reviewed     Procedure Component Value Units Date/Time    CBC and differential [[id number]]  (Abnormal) Collected: 11/16/22 0736    Lab Status: Final result Specimen: Blood from Arm, Right Updated: 11/16/22 0844     WBC 4 34 Thousand/uL      RBC 4 20 Million/uL      Hemoglobin 6 1 g/dL      Hematocrit 23 7 %      MCV 56 fL      MCH 14 5 pg      MCHC 25 7 g/dL      RDW 21 7 %      MPV 9 0 fL      Platelets 457 Thousands/uL      nRBC 1 /100 WBCs      Neutrophils Relative 72 %      Immat GRANS % 1 %      Lymphocytes Relative 12 %      Monocytes Relative 11 %      Eosinophils Relative 3 %      Basophils Relative 1 %      Neutrophils Absolute 3 14 Thousands/µL      Immature Grans Absolute 0 02 Thousand/uL      Lymphocytes Absolute 0 53 Thousands/µL      Monocytes Absolute 0 47 Thousand/µL      Eosinophils Absolute 0 14 Thousand/µL      Basophils Absolute 0 04 Thousands/µL     CMP [[id number]]  (Abnormal) Collected: 11/16/22 0736    Lab Status: Final result Specimen: Blood from Arm, Right Updated: 11/16/22 0802     Sodium 133 mmol/L      Potassium 3 5 mmol/L Chloride 101 mmol/L      CO2 25 mmol/L      ANION GAP 7 mmol/L      BUN 12 mg/dL      Creatinine 0 62 mg/dL      Glucose 96 mg/dL      Calcium 8 6 mg/dL      Corrected Calcium 9 4 mg/dL      AST 11 U/L      ALT 19 U/L      Alkaline Phosphatase 85 U/L      Total Protein 7 2 g/dL      Albumin 3 0 g/dL      Total Bilirubin 0 24 mg/dL      eGFR 96 ml/min/1 73sq m     Narrative:      [place] guidelines for Chronic Kidney Disease (CKD):   •  Stage 1 with normal or high GFR (GFR > 90 mL/min/1 73 square meters)  •  Stage 2 Mild CKD (GFR = 60-89 mL/min/1 73 square meters)  •  Stage 3A Moderate CKD (GFR = 45-59 mL/min/1 73 square meters)  •  Stage 3B Moderate CKD (GFR = 30-44 mL/min/1 73 square meters)  •  Stage 4 Severe CKD (GFR = 15-29 mL/min/1 73 square meters)  •  Stage 5 End Stage CKD (GFR <15 mL/min/1 73 square meters)  Note: GFR calculation is accurate only with a steady state creatinine    Lipase [[id number]]  (Normal) Collected: 11/16/22 0736    Lab Status: Final result Specimen: Blood from Arm, Right Updated: 11/16/22 0802     Lipase 144 u/L                  CT chest abdomen pelvis w contrast   Final Result by Kaur Cutler MD (11/16 1015)         1  Mild nonspecific wall thickening of the mid-distal esophagus which could reflect esophagitis  Postoperative changes consistent with known prior fundoplication  2   Small gallstone without evidence for cholecystitis or biliary obstruction  3   Additional findings as noted                    Workstation performed: [id number]               Procedures  ECG 12 Lead Documentation Only    Date/Time: 11/11/2022 7:56 AM  Performed by: Robyn Zelaya DO  Authorized by: Robyn Zelaya DO     Indications / Diagnosis:  Nausea  ECG reviewed by me, the ED Provider: yes    Patient location:  ED  Interpretation:     Interpretation: normal    Rate:     ECG rate:  87    ECG rate assessment: normal    Rhythm:     Rhythm: sinus rhythm    Ectopy: Ectopy: none    QRS:     QRS axis:  Normal  Conduction:     Conduction: normal    ST segments:     ST segments:  Normal  T waves:     T waves: non-specific            ED Course                                       MDM  Number of Diagnoses or Management Options  Anemia  Dizziness  Nausea & vomiting  Diagnosis management comments: 75-year-old female with past medical history TIF procedure and Schreiber's esophagus presents with a complaint nausea vomiting and lightheadedness  Nausea, vomiting, lightheadedness, anemia  Patient is found to be anemic requiring blood transfusion  This could explain her symptoms of lightheadedness  Patient has history of anemia, patient is offered admission for workup however patient denies ED admission and would like to be discharged home after blood transfusion is completed because she is feeling better and she has to take care of her mother who has dementia at home  Otherwise patient is stable and signed AMA prior to leaving         Amount and/or Complexity of Data Reviewed  Clinical lab tests: ordered and reviewed  Tests in the radiology section of CPT®: ordered and reviewed    Risk of Complications, Morbidity, and/or Mortality  Presenting problems: high  Diagnostic procedures: moderate  Management options: high    Patient Progress  Patient progress: stable      Disposition  Final diagnoses:   Anemia   Dizziness   Nausea & vomiting     Time reflects when diagnosis was documented in both MDM as applicable and the Disposition within this note     Time User Action Codes Description Comment    11/16/2022  2:20 PM Ariela Dey [D64 9] Anemia     11/16/2022  2:20 PM Dustin Hack Add [R42] Dizziness     11/16/2022  2:20 PM Dustin Hack Add [R11 2] Nausea & vomiting       ED Disposition     ED Disposition   AMA    Condition   --    Date/Time   Wed Nov 16, 2022  2:22 PM    Comment   Date: 11/16/2022  Patient: Rochelle Coles  Admitted: 11/16/2022  7:08 AM  Attending Provider: No att  providers found    Brando Posadas or her authorized caregiver has made the decision for the patient to leave the emergency department  against the advice of her attending physician  She or her authorized caregiver has been informed and understands the inherent risks, including death  She or her authorized caregiver has decided to accept the responsibility for this decision  Swayzee Cornfield ie A Skumanich and all necessary parties have been advised that she may return for further evaluation or treatment  Her condition at time of discharge was stable  Carly Plascencia had current vital signs as follows:  /73 (BP Location: Lef t arm)   Pulse 80   Temp 98 5 °F (36 9 °C) (Oral)   Resp 19            Follow-up Information     Follow up With Specialties Details Why Contact Info Additional [address] Emergency Department Emergency Medicine Go to  If symptoms worsen or if you have additional concerns [address] [id number]  [address] Emergency Department, [address], [address]    Usman Mathews MD Internal Medicine Schedule an appointment as soon as possible for a visit   [address]  [phone]             Discharge Medication List as of 11/16/2022  3:39 PM      CONTINUE these medications which have NOT CHANGED    Details   acetaminophen (TYLENOL) 325 mg tablet Take 3 tablets (975 mg total) by mouth every 8 (eight) hours, Starting Fri 5/14/2021, Normal      amLODIPine (NORVASC) 5 mg tablet TAKE 1 TABLET (5 MG TOTAL) BY MOUTH DAILY  , Starting Sun 7/10/2022, Normal      ferrous sulfate 325 (65 Fe) mg tablet Take 1 tablet (325 mg total) by mouth 2 (two) times a day with meals, Starting Wed 1/12/2022, Normal      levothyroxine 25 mcg tablet TAKE 1 TABLET BY MOUTH EVERY DAY, Normal      LORazepam (ATIVAN) 2 mg tablet Take 1 tablet (2 mg total) by mouth every 6 (six) hours as needed for anxiety, Starting Mon 10/3/2022, Normal      mupirocin (BACTROBAN) 2 % ointment Apply topically 2 (two) times a day, Starting Thu 9/15/2022, Normal      ondansetron (ZOFRAN) 4 mg tablet Take 1 tablet (4 mg total) by mouth every 8 (eight) hours as needed for nausea or vomiting, Starting Fri 10/21/2022, Normal      pantoprazole (PROTONIX) 40 mg tablet Take 1 tablet (40 mg total) by mouth 2 (two) times a day, Starting Wed 7/20/2022, Normal      PARoxetine (PAXIL) 30 mg tablet Take 2 tablets (60mg total) by mouth daily, Normal      QUEtiapine (SEROquel XR) 400 mg 24 hr tablet TAKE 1 TABLET (400 MG TOTAL) BY MOUTH DAILY AT BEDTIME, Starting Thu 11/3/2022, Normal      sucralfate (CARAFATE) 1 g tablet TAKE 1 TABLET (1 G TOTAL) BY MOUTH 3 (THREE) TIMES A DAY WITH MEALS, Starting Sat 9/24/2022, Normal      triamcinolone (KENALOG) 0 1 % cream For acute flares, apply topically twice daily for up to 2 weeks at a time and then take one week off  DO NOT use on the face, armpits, and groin area  When not flaring, can use 1-2 times weekly on areas prone to rash , Normal      ziprasidone (GEODON) 80 mg capsule TAKE 1 CAPSULE BY MOUTH EVERY DAY, Normal           No discharge procedures on file  PDMP Review       Value Time User    PDMP Reviewed  Yes 10/3/2022  9:37 AM Yaneli Banks MD           ED Provider  Attending physically available and evaluated Betzaida Gomes  DAMARIS managed the patient along with the ED Attending      Electronically Signed by         [facility] Gonzalez Dr, DO  11/19/22 5830

## 2022-11-16 NOTE — CASE MANAGEMENT
Case Management ED Assessment    Patient name Antonio Cunningham  Location ED 18/ED 18 MRN [id number]  : 1960 Date 2022        OBJECTIVE:  Predictive Model Details         90% Factor Value    Risk of Hospital Admission or ED Visit Model Number of ED Visits 5+     Number of Hospitalizations 3     Has Medicaid Yes     Is in Relationship No     Has Anemia Yes     Has Depression Yes     Has PCP Yes       Chief Complaint: Dizziness     Patient Class: Emergency  Preferred Pharmacy:   [facility], [address] [place] [address] [place]  [address] [address]  Phone: [phone] Fax: [phone]    Primary Care Provider: Maci Craig MD    Primary Insurance: [address] [place] [address],[address] [facility]  Secondary Insurance: [address] [place] [address]    ED ASSESSMENT:  [address], [facility]   Primary Phone: [phone] (Mobile)  Home Phone: [phone]                Readmission Root Cause  30 Day Readmission: Yes  Who directed you to return to the hospital?: Self  Did you understand whom to contact if you had questions or problems?: Yes  Did you get your prescriptions before you left the hospital?: Yes  Were you able to get your prescriptions filled when you left the hospital?: Yes  Did you take your medications as prescribed?: Yes  Were you able to get to your follow-up appointments?: Yes    Outpatient Care Information  Have you seen a doctor in the last year?: Yes    Patient Information  Admitted from[de-identified] Home  Mental Status: Alert  During Assessment patient was accompanied by: Not accompanied during assessment  Assessment information provided by[de-identified] Patient  Primary Caregiver: Self  Support Systems: [facility] of Residence: [address] [place] [address] do you live in?: [place]  Type of Current Residence: [facility]  In the last 12 months, was there a time when you were not able to pay the mortgage or rent on time?: No  In the last 12 months, how many places have you lived?: 1  In the last 12 months, was there a time when you did not have a steady place to sleep or slept in a shelter (including now)?: No  Homeless/housing insecurity resource given?: No  Living Arrangements: Other (Comment) (Lives with mother who has dementia)    Patient Information Continued  Income Source: SSI/SSD  Does patient have prescription coverage?: Yes  Within the past 12 months, you worried that your food would run out before you got the money to buy more : Never true  Within the past 12 months, the food you bought just didn't last and you didn't have money to get more : Never true  Food insecurity resource given?: No  Does patient receive dialysis treatments?: No  Does patient have a history of substance abuse?: No  Does patient have a history of Mental Health Diagnosis?: Yes (anxiety, depression)  Is patient receiving treatment for mental health?: Yes    Food and Transportation  What is patient's usual means of transportation?: Drives Self  Ask patient:  How would you describe your eating habits?: Good

## 2022-11-16 NOTE — ED ATTENDING ATTESTATION
11/16/2022  IFior MD, saw and evaluated the patient  I have discussed the patient with the resident/non-physician practitioner and agree with the resident's/non-physician practitioner's findings, Plan of Care, and MDM as documented in the resident's/non-physician practitioner's note, except where noted  All available labs and Radiology studies were reviewed  I was present for key portions of any procedure(s) performed by the resident/non-physician practitioner and I was immediately available to provide assistance  At this point I agree with the current assessment done in the Emergency Department    I have conducted an independent evaluation of this patient a history and physical is as follows:  C/o   Nausea and vomiting  nbnb   Over last 3 months  Prior gerd and fundoplication    On reglan   Has lightheaded     No HA  No vertigo  No focal neuro symptoms no fever no abd pain    Exam the patient is in no acute distress HEENT exam is unremarkable sclerae anicteric pupils equal reactive neck is supple no JVD lungs are clear heart is regular with no murmurs gallops rubs abdomen is soft positive bowel sounds  nondistended mild epigastric tenderness without rebound or guarding  Extremities normal neurologic exam cranial nerves 2-12 intact motor 5/5 sensory grossly intact cerebellar testing normal  Impression chronic vomiting and nausea symptomatic care lab workup CT abdomen pelvis to rule out bowel obstruction    ED Course         Critical Care Time  Procedures

## 2022-11-16 NOTE — CASE MANAGEMENT
Case Management ED Discharge Planning Note    Patient name Caty Caceres ED 18/ED 18 MRN [id number]  : 1960 Date 2022        OBJECTIVE:  Predictive Model Details         90% Factor Value    Risk of Hospital Admission or ED Visit Model Number of ED Visits 5+     Number of Hospitalizations 3     Has Medicaid Yes     Is in Relationship No     Has Anemia Yes     Has Depression Yes     Has PCP Yes       Chief Complaint: Dizziness   Patient Class: Emergency  Preferred Pharmacy:   [facility], [address] [address]  Phone: [phone] Fax: [phone]    Primary Care Provider: Ginny Lunsford MD    Primary Insurance: Sharyl Corporal MEDICARE [facility]  Secondary Insurance: [address] [place] [address]    ED Discharge Details:    Discharge planning discussed with[de-identified] Patient  Freedom of Choice: Yes     CM contacted family/caregiver?: No- see comments (Pt states she has no friends or family other than her mother who has dementia)  Were Treatment Team discharge recommendations reviewed with patient/caregiver?: Yes  Did patient/caregiver verbalize understanding of patient care needs?: Yes  Were patient/caregiver advised of the risks associated with not following Treatment Team discharge recommendations?: Yes  Other Referral/Resources/Interventions Provided:  Interventions: Other (Specify) (Care [facility], [facility])  Referral Comments: CM provided pt with resources for [facility] and [facility] in addition to making a referral to [address] [place] [address] for evaluation for waiver services for her mother    [facility] Services[de-identified] [facility]         Treatment Team Recommendation: Other (Pt signing out AMA, recommendation is to say in hospital)  Discharge Destination Plan[de-identified] Home     Transport at Discharge : Ride Share         Additional Comments: CM met with pt at bedside due to pt stating she was signing out after blood transfusion due to not having anyone to care for her mother at home while she was in the hospital   Pt reports that last week when she was at [facility] her mother was also admitted to the hospital because there was no one to care for mother at home  CM discussed care options with pt  Pt states she has no friends or family who would be able to care for her mother  Pt reports that her mother is able to get out of bed to go to the bathroom and can get herself a drink but she is unable to cook meals or get to the kitchen because her legs hurt too much to walk that far  Pt states that they already get Meals on Wheels through the [place] and someone comes to take the garbage cans out but they do not have any Home Care assistance  CM called [address] and spoke to staff there to make a referral for Waiver services, CM was directed to call [address]  CM called [facility] and was told to have family complete and send in the application for Waiver services  CM provided paperwork to pt

## 2022-11-16 NOTE — ED NOTES
Pt states that she would like to receive this unit of blood and then sign out AMA because her mother is home alone and cannot care for herself  Dr Zachery Riley notified, case management consulted and spoke with patient regarding options for services        [facility] Elder Turner RN  11/16/22 [id number]

## 2022-11-16 NOTE — TELEPHONE ENCOUNTER
Lab Result: Critical Lab Hemoglobin 6 2    Date/Time Drawn: 11/15/22  @ 8: 10 AM    Ordering Provider: Dr Afshin Mendoza Name: Orladno/Ainsley Quezada        The following critical/stat result was read back to the lab as stated above and [employer] to the on-call provider  The provider confirmed receipt of the message

## 2022-11-17 LAB
ABO GROUP BLD BPU: NORMAL
ABO GROUP BLD BPU: NORMAL
BPU ID: NORMAL
BPU ID: NORMAL
CROSSMATCH: NORMAL
CROSSMATCH: NORMAL
UNIT DISPENSE STATUS: NORMAL
UNIT DISPENSE STATUS: NORMAL
UNIT PRODUCT CODE: NORMAL
UNIT PRODUCT CODE: NORMAL
UNIT PRODUCT VOLUME: 350 ML
UNIT PRODUCT VOLUME: 350 ML
UNIT RH: NORMAL
UNIT RH: NORMAL

## 2022-11-17 NOTE — TELEPHONE ENCOUNTER
Pt returning call  Pt states she was in the hospital yesterday, and received 2 pints of blood   She believes her barretts esophagus is bleeding

## 2022-11-18 ENCOUNTER — TELEMEDICINE (OUTPATIENT)
Dept: INTERNAL MEDICINE CLINIC | Facility: CLINIC | Age: 62
End: 2022-11-18

## 2022-11-18 DIAGNOSIS — D50.9 MICROCYTIC ANEMIA: Primary | ICD-10-CM

## 2022-11-18 NOTE — LETTER
November 18, 2022     Zo Gates MD  [address]  [facility] [address]    Patient: Tameka Oliver   YOB: 1960   Date of Visit: 11/18/2022       Dear Dr Marcos Charles:    Patient just went to the emergency room for low hemoglobin 6 1, was transfused, admission was recommended, but she signed out AMA due to her feeling the need to return home to help care for her mother  She states she is scheduled for an EGD December 12, but she is hoping to get this sooner, and is willing to going to the hospital to get it if needed  She asked that I reach out to you with her request       Sincerely,        Garrett Negron MD        CC: No Recipients  Garrett Negron MD  11/18/2022  3:34 PM  Incomplete    Virtual Regular Visit    Verification of patient location:    Patient is located in the following state in which I hold an active license [place]      Assessment/Plan:    Problem List Items Addressed This Visit        Other    Microcytic anemia - Primary     Patient just had blood transfusions at the hospital, then she needed to sign out AMA  See HPI for details  I recommend rechecking hemoglobin next week  Will send a message to GI as patient is requesting to get EGD done sooner than December 12th, and she is willing to go into the hospital if needed to get the endoscopy  Patient instructed to go to the emergency room if she is developing chest pain, shortness of breath, lightheadedness, or severe fatigue, or bleeding         Relevant Orders    CBC and differential    Occult Blood, Fecal Immunochemical            Reason for visit is   Chief Complaint   Patient presents with   • Virtual Regular Visit        Encounter provider Garrett Negron MD    Provider located at [address] [place] [id number]      Recent Visits  No visits were found meeting these conditions    Showing recent visits within past 7 days and meeting all other requirements  Today's Visits  Date Type Provider Dept   11/18/22 Telemedicine Rik Cordon MD [facility] today's visits and meeting all other requirements  Future Appointments  No visits were found meeting these conditions  Showing future appointments within next 150 days and meeting all other requirements       The patient was identified by name and date of birth  J Luis Nelson was informed that this is a telemedicine visit and that the visit is being conducted through Telephone  My office door was closed  No one else was in the room  She acknowledged consent and understanding of privacy and security of the video platform  The patient has agreed to participate and understands they can discontinue the visit at any time  Patient is aware this is a billable service  Subjective  J Luis Nelson is a 58 y o  female    Pt was recently sent to ED for low hemoglobin 6 2  She did get a transfusion  She was going to be admitted, but she signed out AMA due to concerns about her mother being home alone  Pt reports having TIFF procedure in the past   She is going to get an EGD 12/12/22 with GI  Pt does not feel she can wait to get the EGD 12/12/22  Pt reports she has been vomiting on daily basis, no coffee ground emesis, no black tarry stool, no vomiting blood  No abd pain right now         Past Medical History:   Diagnosis Date   • Anxiety    • Arthritis    • Back pain at L4-L5 level    • Schreiber esophagus    • Bulimia    • Colon polyp    • Disease of thyroid gland     hypothyroid   • Ear problems    • GERD (gastroesophageal reflux disease)    • History of transfusion    • Hyperlipidemia    • Hypothyroidism    • Leukopenia    • Nasal congestion    • NMS (neuroleptic malignant syndrome) 01/03/2020   • Pneumonia    • Rheumatoid arthritis involving right hip (HCC)    • Sciatica    • Seizure ([facility] [address] )     due to medication mix up 8/2018 only one historically   • Seizures [facility])    • Synovial cyst of lumbar spine    • Vertigo    • Weight gain        Past Surgical History:   Procedure Laterality Date   • BONE MARROW BIOPSY     • BREAST SURGERY      enlargement with implants   • CLOSED REDUCTION DISTAL FEMUR FRACTURE     • COLONOSCOPY     • COSMETIC SURGERY     • DENTAL SURGERY     • HIP ARTHROPLASTY Right 1/2/2020    Procedure: REVISION TOTAL HIP ARTHROPLASTY WITH REPAIR OF PARIPROSTHETIC FRACTURE - POSTERIOR APPROACH;  Surgeon: Ernesto Dominguez MD;  [facility];  Service: Orthopedics   • OH ESOPHAGOGASTRODUODENOSCOPY TRANSORAL DIAGNOSTIC N/A 5/11/2021    Procedure: ESOPHAGOGASTRODUODENOSCOPY (EGD); Surgeon: Lupillo Partida MD;  [facility];  Service: General   • OH ESOPHAGUS SURG PROCEDURE UNLISTED N/A 5/11/2021    Procedure: FUNDOPLICATION TRANSORAL INCISIONLESS;  Surgeon: Lindsey Ray MD;  [facility];  Service: Gastroenterology   • OH LAP, REPAIR PARAESOPHAGEAL HERNIA, INCL FUNDOPLASTY W/ MESH N/A 5/11/2021    Procedure: REPAIR HERNIA PARAESOPHAGEAL  LAPAROSCOPIC;  Surgeon: Lupillo Partida MD;  [facility];  Service: General   • OH TOTAL HIP ARTHROPLASTY Right 12/11/2019    Procedure: ARTHROPLASTY HIP TOTAL ANTERIOR;  Surgeon: Ernesto Dominguez MD;  [facility];  Service: Orthopedics   • RHINOPLASTY     • SINUS SURGERY     • TRANSORAL INCISIONLESS FUNDOPLICATION (TIF)  55/89/0117       Current Outpatient Medications   Medication Sig Dispense Refill   • acetaminophen (TYLENOL) 325 mg tablet Take 3 tablets (975 mg total) by mouth every 8 (eight) hours 30 tablet 0   • amLODIPine (NORVASC) 5 mg tablet TAKE 1 TABLET (5 MG TOTAL) BY MOUTH DAILY   90 tablet 1   • ferrous sulfate 325 (65 Fe) mg tablet Take 1 tablet (325 mg total) by mouth 2 (two) times a day with meals 60 tablet 0   • levothyroxine 25 mcg tablet TAKE 1 TABLET BY MOUTH EVERY DAY 90 tablet 3   • LORazepam (ATIVAN) 2 mg tablet Take 1 tablet (2 mg total) by mouth every 6 (six) hours as needed for anxiety 120 tablet 1   • ondansetron (ZOFRAN) 4 mg tablet Take 1 tablet (4 mg total) by mouth every 8 (eight) hours as needed for nausea or vomiting 20 tablet 5   • pantoprazole (PROTONIX) 40 mg tablet Take 1 tablet (40 mg total) by mouth 2 (two) times a day 60 tablet 5   • PARoxetine (PAXIL) 30 mg tablet Take 2 tablets (60mg total) by mouth daily 180 tablet 3   • QUEtiapine (SEROquel XR) 400 mg 24 hr tablet TAKE 1 TABLET (400 MG TOTAL) BY MOUTH DAILY AT BEDTIME 90 tablet 0   • sucralfate (CARAFATE) 1 g tablet TAKE 1 TABLET (1 G TOTAL) BY MOUTH 3 (THREE) TIMES A DAY WITH MEALS 90 tablet 3   • ziprasidone (GEODON) 80 mg capsule TAKE 1 CAPSULE BY MOUTH EVERY DAY 90 capsule 3   • mupirocin (BACTROBAN) 2 % ointment Apply topically 2 (two) times a day 22 g 0   • triamcinolone (KENALOG) 0 1 % cream For acute flares, apply topically twice daily for up to 2 weeks at a time and then take one week off  DO NOT use on the face, armpits, and groin area  When not flaring, can use 1-2 times weekly on areas prone to rash  80 g 3     No current facility-administered medications for this visit  Allergies   Allergen Reactions   • Risperdal [Risperidone] Shortness Of Breath     Rapid heart beat, SOB   • Zyprexa [Olanzapine] Shortness Of Breath     Rapid heartbeat   • Latex Rash     Band aids, adhesives wears normal underwear, can eat bananas  USE PAPER TAPE       Review of Systems   Constitutional: Positive for fatigue  Negative for chills and fever  HENT: Negative for congestion, nosebleeds, postnasal drip, sore throat and trouble swallowing  Eyes: Negative for pain  Respiratory: Negative for cough, chest tightness, shortness of breath and wheezing  Cardiovascular: Negative for chest pain, palpitations and leg swelling  Gastrointestinal: Negative for abdominal pain, constipation, diarrhea, nausea and vomiting  Endocrine: Negative for polydipsia and polyuria     Genitourinary: Negative for dysuria, flank pain and hematuria  Musculoskeletal: Negative for arthralgias  Skin: Negative for rash  Neurological: Negative for dizziness, tremors, light-headedness and headaches  Hematological: Does not bruise/bleed easily  Psychiatric/Behavioral: Negative for confusion and dysphoric mood  The patient is not nervous/anxious  Video Exam    There were no vitals filed for this visit  Physical Exam  Constitutional:       General: She is not in acute distress  Pulmonary:      Effort: Pulmonary effort is normal  No respiratory distress  Neurological:      Mental Status: She is alert  I spent 20 minutes with patient today in which greater than 50% of the time was spent in counseling/coordination of care regarding acute and chronic issues        Lashay Watkins MD  11/18/2022  3:19 PM  Sign when Signing Visit    Virtual Regular Visit    Verification of patient location:    Patient is located in the following state in which I hold an active license {[facility] virtual patient location:[id number]}      Assessment/Plan:    Problem List Items Addressed This Visit    None           Reason for visit is   Chief Complaint   Patient presents with   • Virtual Regular Visit        Encounter provider Lashay Watkins MD    Provider located at [address] [place], [place] [id number]      Recent Visits  No visits were found meeting these conditions  Showing recent visits within past 7 days and meeting all other requirements  Future Appointments  No visits were found meeting these conditions  Showing future appointments within next 150 days and meeting all other requirements       The patient was identified by name and date of birth  Belia Bloch Susankemal was informed that this is a telemedicine visit and that the visit is being conducted through {AMB VIRTUAL VISIT YTOKJT:25292}    {Telemedicine confidentiality :39567} {Telemedicine participants:27565}  She acknowledged consent and understanding of privacy and security of the video platform  The patient has agreed to participate and understands they can discontinue the visit at any time  Patient is aware this is a billable service  Subjective  Heron Tong is a 58 y o  female ***   HPI     Past Medical History:   Diagnosis Date   • Anxiety    • Arthritis    • Back pain at L4-L5 level    • Schreiber esophagus    • Bulimia    • Colon polyp    • Disease of thyroid gland     hypothyroid   • Ear problems    • GERD (gastroesophageal reflux disease)    • History of transfusion    • Hyperlipidemia    • Hypothyroidism    • Leukopenia    • Nasal congestion    • NMS (neuroleptic malignant syndrome) 01/03/2020   • Pneumonia    • Rheumatoid arthritis involving right hip (HCC)    • Sciatica    • Seizure ([address] )     due to medication mix up 8/2018 only one historically   • Seizures ([facility] [address] )    • Synovial cyst of lumbar spine    • Vertigo    • Weight gain        Past Surgical History:   Procedure Laterality Date   • BONE MARROW BIOPSY     • BREAST SURGERY      enlargement with implants   • CLOSED REDUCTION DISTAL FEMUR FRACTURE     • COLONOSCOPY     • COSMETIC SURGERY     • DENTAL SURGERY     • HIP ARTHROPLASTY Right 1/2/2020    Procedure: REVISION TOTAL HIP ARTHROPLASTY WITH REPAIR OF PARIPROSTHETIC FRACTURE - POSTERIOR APPROACH;  Surgeon: Iqra Medeiros MD;  [facility];  Service: Orthopedics   • [place] ESOPHAGOGASTRODUODENOSCOPY TRANSORAL DIAGNOSTIC N/A 5/11/2021    Procedure: ESOPHAGOGASTRODUODENOSCOPY (EGD); Surgeon: Sapphire Leon MD;  [facility];  Service: General   • UT ESOPHAGUS SURG PROCEDURE UNLISTED N/A 5/11/2021    Procedure: FUNDOPLICATION TRANSORAL INCISIONLESS;  Surgeon: Carlene Gamboa MD;  [facility];  Service: Gastroenterology   • UT LAP, REPAIR PARAESOPHAGEAL HERNIA, INCL FUNDOPLASTY W/ MESH N/A 5/11/2021    Procedure: REPAIR HERNIA PARAESOPHAGEAL  LAPAROSCOPIC;  Surgeon:  Sapphire Leon MD;  [facility];  Service: General   • [place] TOTAL HIP ARTHROPLASTY Right 12/11/2019    Procedure: ARTHROPLASTY HIP TOTAL ANTERIOR;  Surgeon: Mack Bang MD;  [facility];  Service: Orthopedics   • RHINOPLASTY     • SINUS SURGERY     • TRANSORAL INCISIONLESS FUNDOPLICATION (TIF)  51/01/1228       Current Outpatient Medications   Medication Sig Dispense Refill   • acetaminophen (TYLENOL) 325 mg tablet Take 3 tablets (975 mg total) by mouth every 8 (eight) hours 30 tablet 0   • amLODIPine (NORVASC) 5 mg tablet TAKE 1 TABLET (5 MG TOTAL) BY MOUTH DAILY  90 tablet 1   • ferrous sulfate 325 (65 Fe) mg tablet Take 1 tablet (325 mg total) by mouth 2 (two) times a day with meals 60 tablet 0   • levothyroxine 25 mcg tablet TAKE 1 TABLET BY MOUTH EVERY DAY 90 tablet 3   • LORazepam (ATIVAN) 2 mg tablet Take 1 tablet (2 mg total) by mouth every 6 (six) hours as needed for anxiety 120 tablet 1   • mupirocin (BACTROBAN) 2 % ointment Apply topically 2 (two) times a day 22 g 0   • ondansetron (ZOFRAN) 4 mg tablet Take 1 tablet (4 mg total) by mouth every 8 (eight) hours as needed for nausea or vomiting 20 tablet 5   • pantoprazole (PROTONIX) 40 mg tablet Take 1 tablet (40 mg total) by mouth 2 (two) times a day 60 tablet 5   • PARoxetine (PAXIL) 30 mg tablet Take 2 tablets (60mg total) by mouth daily 180 tablet 3   • QUEtiapine (SEROquel XR) 400 mg 24 hr tablet TAKE 1 TABLET (400 MG TOTAL) BY MOUTH DAILY AT BEDTIME 90 tablet 0   • sucralfate (CARAFATE) 1 g tablet TAKE 1 TABLET (1 G TOTAL) BY MOUTH 3 (THREE) TIMES A DAY WITH MEALS 90 tablet 3   • triamcinolone (KENALOG) 0 1 % cream For acute flares, apply topically twice daily for up to 2 weeks at a time and then take one week off  DO NOT use on the face, armpits, and groin area  When not flaring, can use 1-2 times weekly on areas prone to rash   80 g 3   • ziprasidone (GEODON) 80 mg capsule TAKE 1 CAPSULE BY MOUTH EVERY DAY 90 capsule 3     No current facility-administered medications for this visit  Allergies   Allergen Reactions   • Risperdal [Risperidone] Shortness Of Breath     Rapid heart beat, SOB   • Zyprexa [Olanzapine] Shortness Of Breath     Rapid heartbeat   • Latex Rash     Band aids, adhesives wears normal underwear, can eat bananas  USE PAPER TAPE       Review of Systems    Video Exam    There were no vitals filed for this visit      Physical Exam     {Time Spent:70846}

## 2022-11-18 NOTE — PROGRESS NOTES
Virtual Regular Visit    Verification of patient location:    Patient is located in the following state in which I hold an active license [place]      Assessment/Plan:    Problem List Items Addressed This Visit        Other    Microcytic anemia - Primary     Patient just had blood transfusions at the hospital, then she needed to sign out AMA  See HPI for details  I recommend rechecking hemoglobin next week  Will send a message to GI as patient is requesting to get EGD done sooner than December 12th, and she is willing to go into the hospital if needed to get the endoscopy  Patient instructed to go to the emergency room if she is developing chest pain, shortness of breath, lightheadedness, or severe fatigue, or bleeding         Relevant Orders    CBC and differential    Occult Blood, Fecal Immunochemical            Reason for visit is   Chief Complaint   Patient presents with   • Virtual Regular Visit        Encounter provider Nickie Phoenix, MD    Provider located at [address] [place], [place] [id number]      Recent Visits  No visits were found meeting these conditions  Showing recent visits within past 7 days and meeting all other requirements  Today's Visits  Date Type Provider Dept   11/18/22 Telemedicine Nickie Phoenix, MD [facility] today's visits and meeting all other requirements  Future Appointments  No visits were found meeting these conditions  Showing future appointments within next 150 days and meeting all other requirements       The patient was identified by name and date of birth  Edna Cabrera was informed that this is a telemedicine visit and that the visit is being conducted through Telephone  My office door was closed  No one else was in the room  She acknowledged consent and understanding of privacy and security of the video platform   The patient has agreed to participate and understands they can discontinue the visit at any time  Patient is aware this is a billable service  Subjective  Antonio Cunningham is a 58 y o  female    Pt was recently sent to ED for low hemoglobin 6 2  She did get a transfusion  She was going to be admitted, but she signed out AMA due to concerns about her mother being home alone  Pt reports having TIFF procedure in the past   She is going to get an EGD 12/12/22 with GI  Pt does not feel she can wait to get the EGD 12/12/22  Pt reports she has been vomiting on daily basis, no coffee ground emesis, no black tarry stool, no vomiting blood  No abd pain right now  Past Medical History:   Diagnosis Date   • Anxiety    • Arthritis    • Back pain at L4-L5 level    • Schreiber esophagus    • Bulimia    • Colon polyp    • Disease of thyroid gland     hypothyroid   • Ear problems    • GERD (gastroesophageal reflux disease)    • History of transfusion    • Hyperlipidemia    • Hypothyroidism    • Leukopenia    • Nasal congestion    • NMS (neuroleptic malignant syndrome) 01/03/2020   • Pneumonia    • Rheumatoid arthritis involving right hip (HCC)    • Sciatica    • Seizure ([address] )     due to medication mix up 8/2018 only one historically   • Seizures ([address] )    • Synovial cyst of lumbar spine    • Vertigo    • Weight gain        Past Surgical History:   Procedure Laterality Date   • BONE MARROW BIOPSY     • BREAST SURGERY      enlargement with implants   • CLOSED REDUCTION DISTAL FEMUR FRACTURE     • COLONOSCOPY     • COSMETIC SURGERY     • DENTAL SURGERY     • HIP ARTHROPLASTY Right 1/2/2020    Procedure: REVISION TOTAL HIP ARTHROPLASTY WITH REPAIR OF PARIPROSTHETIC FRACTURE - POSTERIOR APPROACH;  Surgeon: Lilia Childers MD;  [facility];  Service: Orthopedics   • AL ESOPHAGOGASTRODUODENOSCOPY TRANSORAL DIAGNOSTIC N/A 5/11/2021    Procedure: ESOPHAGOGASTRODUODENOSCOPY (EGD); Surgeon:  Lázaro Palmer MD;  [facility];  Service: General   • AL ESOPHAGUS SURG PROCEDURE UNLISTED N/A 5/11/2021    Procedure: FUNDOPLICATION TRANSORAL INCISIONLESS;  Surgeon: Clyde Canales MD;  [facility];  Service: Gastroenterology   • CO LAP, REPAIR PARAESOPHAGEAL HERNIA, INCL FUNDOPLASTY W/ MESH N/A 5/11/2021    Procedure: REPAIR HERNIA PARAESOPHAGEAL  LAPAROSCOPIC;  Surgeon: Jess Valle MD;  [facility];  Service: General   • CO TOTAL HIP ARTHROPLASTY Right 12/11/2019    Procedure: ARTHROPLASTY HIP TOTAL ANTERIOR;  Surgeon: Mack Bang MD;  [facility];  Service: Orthopedics   • RHINOPLASTY     • SINUS SURGERY     • TRANSORAL INCISIONLESS FUNDOPLICATION (TIF)  09/49/1585       Current Outpatient Medications   Medication Sig Dispense Refill   • acetaminophen (TYLENOL) 325 mg tablet Take 3 tablets (975 mg total) by mouth every 8 (eight) hours 30 tablet 0   • amLODIPine (NORVASC) 5 mg tablet TAKE 1 TABLET (5 MG TOTAL) BY MOUTH DAILY   90 tablet 1   • ferrous sulfate 325 (65 Fe) mg tablet Take 1 tablet (325 mg total) by mouth 2 (two) times a day with meals 60 tablet 0   • levothyroxine 25 mcg tablet TAKE 1 TABLET BY MOUTH EVERY DAY 90 tablet 3   • LORazepam (ATIVAN) 2 mg tablet Take 1 tablet (2 mg total) by mouth every 6 (six) hours as needed for anxiety 120 tablet 1   • ondansetron (ZOFRAN) 4 mg tablet Take 1 tablet (4 mg total) by mouth every 8 (eight) hours as needed for nausea or vomiting 20 tablet 5   • pantoprazole (PROTONIX) 40 mg tablet Take 1 tablet (40 mg total) by mouth 2 (two) times a day 60 tablet 5   • PARoxetine (PAXIL) 30 mg tablet Take 2 tablets (60mg total) by mouth daily 180 tablet 3   • QUEtiapine (SEROquel XR) 400 mg 24 hr tablet TAKE 1 TABLET (400 MG TOTAL) BY MOUTH DAILY AT BEDTIME 90 tablet 0   • sucralfate (CARAFATE) 1 g tablet TAKE 1 TABLET (1 G TOTAL) BY MOUTH 3 (THREE) TIMES A DAY WITH MEALS 90 tablet 3   • ziprasidone (GEODON) 80 mg capsule TAKE 1 CAPSULE BY MOUTH EVERY DAY 90 capsule 3   • mupirocin (BACTROBAN) 2 % ointment Apply topically 2 (two) times a day 22 g 0   • triamcinolone (KENALOG) 0 1 % cream For acute flares, apply topically twice daily for up to 2 weeks at a time and then take one week off  DO NOT use on the face, armpits, and groin area  When not flaring, can use 1-2 times weekly on areas prone to rash  80 g 3     No current facility-administered medications for this visit  Allergies   Allergen Reactions   • Risperdal [Risperidone] Shortness Of Breath     Rapid heart beat, SOB   • Zyprexa [Olanzapine] Shortness Of Breath     Rapid heartbeat   • Latex Rash     Band aids, adhesives wears normal underwear, can eat bananas  USE PAPER TAPE       Review of Systems   Constitutional: Positive for fatigue  Negative for chills and fever  HENT: Negative for congestion, nosebleeds, postnasal drip, sore throat and trouble swallowing  Eyes: Negative for pain  Respiratory: Negative for cough, chest tightness, shortness of breath and wheezing  Cardiovascular: Negative for chest pain, palpitations and leg swelling  Gastrointestinal: Negative for abdominal pain, constipation, diarrhea, nausea and vomiting  Endocrine: Negative for polydipsia and polyuria  Genitourinary: Negative for dysuria, flank pain and hematuria  Musculoskeletal: Negative for arthralgias  Skin: Negative for rash  Neurological: Negative for dizziness, tremors, light-headedness and headaches  Hematological: Does not bruise/bleed easily  Psychiatric/Behavioral: Negative for confusion and dysphoric mood  The patient is not nervous/anxious  Video Exam    There were no vitals filed for this visit  Physical Exam  Constitutional:       General: She is not in acute distress  Pulmonary:      Effort: Pulmonary effort is normal  No respiratory distress  Neurological:      Mental Status: She is alert            I spent 20 minutes with patient today in which greater than 50% of the time was spent in counseling/coordination of care regarding acute and chronic issues

## 2022-11-18 NOTE — ASSESSMENT & PLAN NOTE
Patient just had blood transfusions at the hospital, then she needed to sign out AMA  See HPI for details  I recommend rechecking hemoglobin next week  Will send a message to GI as patient is requesting to get EGD done sooner than December 12th, and she is willing to go into the hospital if needed to get the endoscopy    Patient instructed to go to the emergency room if she is developing chest pain, shortness of breath, lightheadedness, or severe fatigue, or bleeding

## 2022-11-18 NOTE — TELEPHONE ENCOUNTER
Patients GI provider:  Dr Geovanna Webster     Number to return call: [phone]    Reason for call: Pt calling stating she was in the hospital and had transfusion and would like to speak with someone to discuss throwing up everyday     Scheduled procedure/appointment date if applicable: Apt/procedure 12-12-22

## 2022-11-18 NOTE — LETTER
November 18, 2022     Codey Dia MD  [address]  [place] [address] [facility] [address]    Patient: Antonio Cunningham   YOB: 1960   Date of Visit: 11/18/2022       Dear Dr Grisel Remy:    Patient just went to the emergency room for low hemoglobin 6 1, was transfused, admission was recommended, but she signed out AMA due to her feeling the need to return home to help care for her mother  She states she is scheduled for an EGD December 12, but she is hoping to get this sooner, and is willing to going to the hospital to get it if needed  She asked that I reach out to you with her request          Sincerely,        Maci Craig MD        CC: No Recipients  Maci Craig MD  11/18/2022  3:31 PM  Incomplete    Virtual Regular Visit    Verification of patient location:    Patient is located in the following state in which I hold an active license [place]      Assessment/Plan:    Problem List Items Addressed This Visit        Other    Microcytic anemia - Primary     Patient just had blood transfusions at the hospital, then she needed to sign out AMA  See HPI for details  I recommend rechecking hemoglobin next week  Will send a message to GI as patient is requesting to get EGD done sooner than December 12th, and she is willing to go into the hospital if needed to get the endoscopy  Patient instructed to go to the emergency room if she is developing chest pain, shortness of breath, lightheadedness, or severe fatigue, or bleeding         Relevant Orders    CBC and differential    Occult Blood, Fecal Immunochemical            Reason for visit is   Chief Complaint   Patient presents with   • Virtual Regular Visit        Encounter provider Maci Craig MD    Provider located at [address] [place] [id number]      Recent Visits  No visits were found meeting these conditions    Showing recent visits within past 7 days and meeting all other requirements  Today's Visits  Date Type Provider Dept   11/18/22 Telemedicine Aracelis Alston MD [facility] today's visits and meeting all other requirements  Future Appointments  No visits were found meeting these conditions  Showing future appointments within next 150 days and meeting all other requirements       The patient was identified by name and date of birth  Ethel Lucero was informed that this is a telemedicine visit and that the visit is being conducted through Telephone  My office door was closed  No one else was in the room  She acknowledged consent and understanding of privacy and security of the video platform  The patient has agreed to participate and understands they can discontinue the visit at any time  Patient is aware this is a billable service  Subjective  Ethel Lucero is a 58 y o  female    Pt was recently sent to ED for low hemoglobin 6 2  She did get a transfusion  She was going to be admitted, but she signed out AMA due to concerns about her mother being home alone  Pt reports having TIFF procedure in the past   She is going to get an EGD 12/12/22 with GI  Pt does not feel she can wait to get the EGD 12/12/22  Pt reports she has been vomiting on daily basis, no coffee ground emesis, no black tarry stool, no vomiting blood  No abd pain right now         Past Medical History:   Diagnosis Date   • Anxiety    • Arthritis    • Back pain at L4-L5 level    • Schreiber esophagus    • Bulimia    • Colon polyp    • Disease of thyroid gland     hypothyroid   • Ear problems    • GERD (gastroesophageal reflux disease)    • History of transfusion    • Hyperlipidemia    • Hypothyroidism    • Leukopenia    • Nasal congestion    • NMS (neuroleptic malignant syndrome) 01/03/2020   • Pneumonia    • Rheumatoid arthritis involving right hip (HCC)    • Sciatica    • Seizure ([facility] )     due to medication mix up 8/2018 only one historically   • Seizures [facility])    • Synovial cyst of lumbar spine    • Vertigo    • Weight gain        Past Surgical History:   Procedure Laterality Date   • BONE MARROW BIOPSY     • BREAST SURGERY      enlargement with implants   • CLOSED REDUCTION DISTAL FEMUR FRACTURE     • COLONOSCOPY     • COSMETIC SURGERY     • DENTAL SURGERY     • HIP ARTHROPLASTY Right 1/2/2020    Procedure: REVISION TOTAL HIP ARTHROPLASTY WITH REPAIR OF PARIPROSTHETIC FRACTURE - POSTERIOR APPROACH;  Surgeon: Chana Vargas MD;  [facility];  Service: Orthopedics   • PA ESOPHAGOGASTRODUODENOSCOPY TRANSORAL DIAGNOSTIC N/A 5/11/2021    Procedure: ESOPHAGOGASTRODUODENOSCOPY (EGD); Surgeon: Chris Khan MD;  [facility];  Service: General   • PA ESOPHAGUS SURG PROCEDURE UNLISTED N/A 5/11/2021    Procedure: FUNDOPLICATION TRANSORAL INCISIONLESS;  Surgeon: Saravanan Llanos MD;  [facility];  Service: Gastroenterology   • PA LAP, REPAIR PARAESOPHAGEAL HERNIA, INCL FUNDOPLASTY W/ MESH N/A 5/11/2021    Procedure: REPAIR HERNIA PARAESOPHAGEAL  LAPAROSCOPIC;  Surgeon: Chris Khan MD;  [facility];  Service: General   • PA TOTAL HIP ARTHROPLASTY Right 12/11/2019    Procedure: ARTHROPLASTY HIP TOTAL ANTERIOR;  Surgeon: Chana Vargas MD;  [facility];  Service: Orthopedics   • RHINOPLASTY     • SINUS SURGERY     • TRANSORAL INCISIONLESS FUNDOPLICATION (TIF)  40/04/6354       Current Outpatient Medications   Medication Sig Dispense Refill   • acetaminophen (TYLENOL) 325 mg tablet Take 3 tablets (975 mg total) by mouth every 8 (eight) hours 30 tablet 0   • amLODIPine (NORVASC) 5 mg tablet TAKE 1 TABLET (5 MG TOTAL) BY MOUTH DAILY   90 tablet 1   • ferrous sulfate 325 (65 Fe) mg tablet Take 1 tablet (325 mg total) by mouth 2 (two) times a day with meals 60 tablet 0   • levothyroxine 25 mcg tablet TAKE 1 TABLET BY MOUTH EVERY DAY 90 tablet 3   • LORazepam (ATIVAN) 2 mg tablet Take 1 tablet (2 mg total) by mouth every 6 (six) hours as needed for anxiety 120 tablet 1   • ondansetron (ZOFRAN) 4 mg tablet Take 1 tablet (4 mg total) by mouth every 8 (eight) hours as needed for nausea or vomiting 20 tablet 5   • pantoprazole (PROTONIX) 40 mg tablet Take 1 tablet (40 mg total) by mouth 2 (two) times a day 60 tablet 5   • PARoxetine (PAXIL) 30 mg tablet Take 2 tablets (60mg total) by mouth daily 180 tablet 3   • QUEtiapine (SEROquel XR) 400 mg 24 hr tablet TAKE 1 TABLET (400 MG TOTAL) BY MOUTH DAILY AT BEDTIME 90 tablet 0   • sucralfate (CARAFATE) 1 g tablet TAKE 1 TABLET (1 G TOTAL) BY MOUTH 3 (THREE) TIMES A DAY WITH MEALS 90 tablet 3   • ziprasidone (GEODON) 80 mg capsule TAKE 1 CAPSULE BY MOUTH EVERY DAY 90 capsule 3   • mupirocin (BACTROBAN) 2 % ointment Apply topically 2 (two) times a day 22 g 0   • triamcinolone (KENALOG) 0 1 % cream For acute flares, apply topically twice daily for up to 2 weeks at a time and then take one week off  DO NOT use on the face, armpits, and groin area  When not flaring, can use 1-2 times weekly on areas prone to rash  80 g 3     No current facility-administered medications for this visit  Allergies   Allergen Reactions   • Risperdal [Risperidone] Shortness Of Breath     Rapid heart beat, SOB   • Zyprexa [Olanzapine] Shortness Of Breath     Rapid heartbeat   • Latex Rash     Band aids, adhesives wears normal underwear, can eat bananas  USE PAPER TAPE       Review of Systems   Constitutional: Positive for fatigue  Negative for chills and fever  HENT: Negative for congestion, nosebleeds, postnasal drip, sore throat and trouble swallowing  Eyes: Negative for pain  Respiratory: Negative for cough, chest tightness, shortness of breath and wheezing  Cardiovascular: Negative for chest pain, palpitations and leg swelling  Gastrointestinal: Negative for abdominal pain, constipation, diarrhea, nausea and vomiting  Endocrine: Negative for polydipsia and polyuria     Genitourinary: Negative for dysuria, flank pain and hematuria  Musculoskeletal: Negative for arthralgias  Skin: Negative for rash  Neurological: Negative for dizziness, tremors, light-headedness and headaches  Hematological: Does not bruise/bleed easily  Psychiatric/Behavioral: Negative for confusion and dysphoric mood  The patient is not nervous/anxious  Video Exam    There were no vitals filed for this visit  Physical Exam  Constitutional:       General: She is not in acute distress  Pulmonary:      Effort: Pulmonary effort is normal  No respiratory distress  Neurological:      Mental Status: She is alert  I spent 20 minutes with patient today in which greater than 50% of the time was spent in counseling/coordination of care regarding acute and chronic issues        Hardeep Tenorio MD  11/18/2022  3:19 PM  Sign when Signing Visit    Virtual Regular Visit    Verification of patient location:    Patient is located in the following state in which I hold an active license {[facility] virtual patient location:[address]}      Assessment/Plan:    Problem List Items Addressed This Visit    None           Reason for visit is   Chief Complaint   Patient presents with   • Virtual Regular Visit        Encounter provider Hardeep Tenorio MD    Provider located at [address] [place], [place] [id number]      Recent Visits  No visits were found meeting these conditions  Showing recent visits within past 7 days and meeting all other requirements  Future Appointments  No visits were found meeting these conditions  Showing future appointments within next 150 days and meeting all other requirements       The patient was identified by name and date of birth  Fifi Plascencia was informed that this is a telemedicine visit and that the visit is being conducted through {[facility]    {Telemedicine confidentiality :97120} {Telemedicine participants:76680}  She acknowledged consent and understanding of privacy and security of the video platform  The patient has agreed to participate and understands they can discontinue the visit at any time  Patient is aware this is a billable service  Subjective  Geovanna Shelley is a 58 y o  female ***   HPI     Past Medical History:   Diagnosis Date   • Anxiety    • Arthritis    • Back pain at L4-L5 level    • Schreiber esophagus    • Bulimia    • Colon polyp    • Disease of thyroid gland     hypothyroid   • Ear problems    • GERD (gastroesophageal reflux disease)    • History of transfusion    • Hyperlipidemia    • Hypothyroidism    • Leukopenia    • Nasal congestion    • NMS (neuroleptic malignant syndrome) 01/03/2020   • Pneumonia    • Rheumatoid arthritis involving right hip (HCC)    • Sciatica    • [address]iz[address] [address] )     due to medication mix up 8/2018 only one historically   • Seizures ([facility] [address] )    • Synovial cyst of lumbar spine    • Vertigo    • Weight gain        Past Surgical History:   Procedure Laterality Date   • BONE MARROW BIOPSY     • BREAST SURGERY      enlargement with implants   • CLOSED REDUCTION DISTAL FEMUR FRACTURE     • COLONOSCOPY     • COSMETIC SURGERY     • DENTAL SURGERY     • HIP ARTHROPLASTY Right 1/2/2020    Procedure: REVISION TOTAL HIP ARTHROPLASTY WITH REPAIR OF PARIPROSTHETIC FRACTURE - POSTERIOR APPROACH;  Surgeon: Huma Chase MD;  [facility];  Service: Orthopedics   • AR ESOPHAGOGASTRODUODENOSCOPY TRANSORAL DIAGNOSTIC N/A 5/11/2021    Procedure: ESOPHAGOGASTRODUODENOSCOPY (EGD); Surgeon: Heron Coyne MD;  [facility];  Service: General   • AR ESOPHAGUS SURG PROCEDURE UNLISTED N/A 5/11/2021    Procedure: FUNDOPLICATION TRANSORAL INCISIONLESS;  Surgeon: Coleen Mcmahon MD;  [facility];  Service: Gastroenterology   • AR LAP, REPAIR PARAESOPHAGEAL HERNIA, INCL FUNDOPLASTY W/ MESH N/A 5/11/2021    Procedure: REPAIR HERNIA PARAESOPHAGEAL  LAPAROSCOPIC;  Surgeon:  Heron Coyne MD;  [facility];  Service: General   • ME TOTAL HIP ARTHROPLASTY Right 12/11/2019    Procedure: ARTHROPLASTY HIP TOTAL ANTERIOR;  Surgeon: Kadie Montoya MD;  [facility];  Service: Orthopedics   • RHINOPLASTY     • SINUS SURGERY     • TRANSORAL INCISIONLESS FUNDOPLICATION (TIF)  36/56/2702       Current Outpatient Medications   Medication Sig Dispense Refill   • acetaminophen (TYLENOL) 325 mg tablet Take 3 tablets (975 mg total) by mouth every 8 (eight) hours 30 tablet 0   • amLODIPine (NORVASC) 5 mg tablet TAKE 1 TABLET (5 MG TOTAL) BY MOUTH DAILY  90 tablet 1   • ferrous sulfate 325 (65 Fe) mg tablet Take 1 tablet (325 mg total) by mouth 2 (two) times a day with meals 60 tablet 0   • levothyroxine 25 mcg tablet TAKE 1 TABLET BY MOUTH EVERY DAY 90 tablet 3   • LORazepam (ATIVAN) 2 mg tablet Take 1 tablet (2 mg total) by mouth every 6 (six) hours as needed for anxiety 120 tablet 1   • mupirocin (BACTROBAN) 2 % ointment Apply topically 2 (two) times a day 22 g 0   • ondansetron (ZOFRAN) 4 mg tablet Take 1 tablet (4 mg total) by mouth every 8 (eight) hours as needed for nausea or vomiting 20 tablet 5   • pantoprazole (PROTONIX) 40 mg tablet Take 1 tablet (40 mg total) by mouth 2 (two) times a day 60 tablet 5   • PARoxetine (PAXIL) 30 mg tablet Take 2 tablets (60mg total) by mouth daily 180 tablet 3   • QUEtiapine (SEROquel XR) 400 mg 24 hr tablet TAKE 1 TABLET (400 MG TOTAL) BY MOUTH DAILY AT BEDTIME 90 tablet 0   • sucralfate (CARAFATE) 1 g tablet TAKE 1 TABLET (1 G TOTAL) BY MOUTH 3 (THREE) TIMES A DAY WITH MEALS 90 tablet 3   • triamcinolone (KENALOG) 0 1 % cream For acute flares, apply topically twice daily for up to 2 weeks at a time and then take one week off  DO NOT use on the face, armpits, and groin area  When not flaring, can use 1-2 times weekly on areas prone to rash   80 g 3   • ziprasidone (GEODON) 80 mg capsule TAKE 1 CAPSULE BY MOUTH EVERY DAY 90 capsule 3     No current facility-administered medications for this visit  Allergies   Allergen Reactions   • Risperdal [Risperidone] Shortness Of Breath     Rapid heart beat, SOB   • Zyprexa [Olanzapine] Shortness Of Breath     Rapid heartbeat   • Latex Rash     Band aids, adhesives wears normal underwear, can eat bananas  USE PAPER TAPE       Review of Systems    Video Exam    There were no vitals filed for this visit      Physical Exam     {Time Spent:78585}

## 2022-11-18 NOTE — PATIENT INSTRUCTIONS
Problem List Items Addressed This Visit          Other    Microcytic anemia - Primary     Patient just had blood transfusions at the hospital, then she needed to sign out AMA  See HPI for details  I recommend rechecking hemoglobin next week  Will send a message to GI as patient is requesting to get EGD done sooner than December 12th, and she is willing to go into the hospital if needed to get the endoscopy    Patient instructed to go to the emergency room if she is developing chest pain, shortness of breath, lightheadedness, or severe fatigue, or bleeding         Relevant Orders    CBC and differential    Occult Blood, Fecal Immunochemical

## 2022-11-20 PROBLEM — R05.9 COUGH: Status: RESOLVED | Noted: 2022-09-21 | Resolved: 2022-11-20

## 2022-11-21 ENCOUNTER — HOSPITAL ENCOUNTER (OUTPATIENT)
Dept: RADIOLOGY | Facility: HOSPITAL | Age: 62
Discharge: HOME | End: 2022-11-21
Attending: PODIATRIST

## 2022-11-21 DIAGNOSIS — M20.42 HAMMER TOE OF LEFT FOOT: ICD-10-CM

## 2022-11-21 DIAGNOSIS — L08.9 INFECTION OF TOE: ICD-10-CM

## 2022-11-22 ENCOUNTER — TELEPHONE (OUTPATIENT)
Dept: PODIATRY | Facility: CLINIC | Age: 62
End: 2022-11-22

## 2022-11-22 NOTE — TELEPHONE ENCOUNTER
Caller: Tae Michael - Radiology    Doctor: Sabine Ulloa    Reason for call: Significant findings for this pt      Call back#: [phone]

## 2022-11-23 NOTE — TELEPHONE ENCOUNTER
As per verbal instruction from Dr Radha Penny to patient and informed her of her MRI findings and scheduled her for 11/28/22 at 8:30 to discuss further with Dr Miguelito Monahan  Pt verbalized understanding to me

## 2022-11-28 ENCOUNTER — OFFICE VISIT (OUTPATIENT)
Dept: PODIATRY | Facility: CLINIC | Age: 62
End: 2022-11-28

## 2022-11-28 ENCOUNTER — TELEPHONE (OUTPATIENT)
Dept: PODIATRY | Facility: CLINIC | Age: 62
End: 2022-11-28

## 2022-11-28 VITALS
SYSTOLIC BLOOD PRESSURE: 125 MMHG | HEIGHT: 64 IN | BODY MASS INDEX: 28.32 KG/M2 | DIASTOLIC BLOOD PRESSURE: 81 MMHG | HEART RATE: 101 BPM

## 2022-11-28 DIAGNOSIS — R11.0 NAUSEA: ICD-10-CM

## 2022-11-28 DIAGNOSIS — L08.9 INFECTION OF TOE: Primary | ICD-10-CM

## 2022-11-28 DIAGNOSIS — M20.42 HAMMER TOE OF LEFT FOOT: ICD-10-CM

## 2022-11-28 NOTE — PROGRESS NOTES
Patient presents for assessment of left 4th toe  Patient has been ambulating in a surgical shoe  She relates significant improvement as far as pain  On exam, there is a hyperkeratotic lesion overlying the middle phalanx left 4th toe  Trimming of lesion reveals that all ulcers have healed  There is no ulceration at the tip of the toe either  There is no purulence drainage  There is only mild edema  I personally reviewed the MRI dated 11/22/2022  This MRI was positive for osteomyelitis middle and distal phalanx of the left 4th toe  Current clinical findings differ from MRI results  Discussed MRI findings with patient along with current clinical picture  Based on MRI findings, amputation of the left 4th toe at the level of the PIPJ is warranted  Clinically however patient symptoms do not require stat amputation  Patient has no need for antibiotics either oral or topical at this time  Patient has not return to a comfortable shoe as of yet and was told to do so  If she has immediate pain which is expected, amputation at the PIPJ is recommended  Patient will contact me if this is the case  Otherwise she will be seen in 4 weeks  Note:  25 minutes spent with patient discussing MRI results and treatment plan

## 2022-11-28 NOTE — TELEPHONE ENCOUNTER
Caller: patient    Doctor: Bhavya Mendes    Reason for call: She wanted Dr Bhavya Mendes to know that the toe hurts when she puts the sneaker on      Call back#: [phone]

## 2022-11-29 RX ORDER — ONDANSETRON 4 MG/1
4 TABLET, FILM COATED ORAL EVERY 8 HOURS PRN
Qty: 20 TABLET | Refills: 5 | Status: CANCELLED | OUTPATIENT
Start: 2022-11-29

## 2022-11-29 NOTE — TELEPHONE ENCOUNTER
Caller: Self    Doctor: Nilda Burciaga    Reason for call: Patient called to follow up on previous message  I advised her Dr will be contacting her      Call back#: [phone]

## 2022-12-01 ENCOUNTER — PREP FOR PROCEDURE (OUTPATIENT)
Dept: PODIATRY | Facility: CLINIC | Age: 62
End: 2022-12-01

## 2022-12-01 DIAGNOSIS — M86.9 OSTEOMYELITIS OF FOURTH TOE OF LEFT FOOT (HCC): Primary | ICD-10-CM

## 2022-12-01 DIAGNOSIS — M20.42 HAMMER TOE OF LEFT FOOT: ICD-10-CM

## 2022-12-01 RX ORDER — CHLORHEXIDINE GLUCONATE 0.12 MG/ML
15 RINSE ORAL ONCE
OUTPATIENT
Start: 2022-12-01 | End: 2022-12-01

## 2022-12-02 ENCOUNTER — TELEPHONE (OUTPATIENT)
Dept: INTERNAL MEDICINE CLINIC | Facility: CLINIC | Age: 62
End: 2022-12-02

## 2022-12-02 ENCOUNTER — TELEPHONE (OUTPATIENT)
Dept: GASTROENTEROLOGY | Facility: CLINIC | Age: 62
End: 2022-12-02

## 2022-12-02 DIAGNOSIS — D64.9 ANEMIA, UNSPECIFIED TYPE: Primary | ICD-10-CM

## 2022-12-02 NOTE — TELEPHONE ENCOUNTER
Called and spoke to pt and made aware to have blood work done by [facility] [employer] as she goes to [address] emailed to pt

## 2022-12-02 NOTE — TELEPHONE ENCOUNTER
Pt reports having infection in toe in left foot, she is seeing podiatry for this  She had an MRI showing osteomyelitis of toe  She is going to get surgery for this  She is scheduled for an EGD December 12th, and surgery December 16th  Patient concerned about getting the surgery after the EGD  I explained the importance of getting the surgery as untreated osteomyelitis can be a bad thing

## 2022-12-02 NOTE — TELEPHONE ENCOUNTER
Juancho Farrell at [facility]:  Austin Segundo   60 is scheduled for and EGD on   According to her chart she had a blood transfusion on  for a Hemoglobin of 6 1 and checked out of the hospital AMA  Since then I do not see a repeat CBC or Hemoglobin on this patient  I addressed it with Anesthesia and they said they would like to see a repeat test outpatient before   Can a blood work script please be created for pt? Pt needs to have done prior to her procedure at [facility] on   I will then call to inform her to have done a few days before  Thank you so much!

## 2022-12-05 ENCOUNTER — OFFICE VISIT (OUTPATIENT)
Dept: PODIATRY | Facility: CLINIC | Age: 62
End: 2022-12-05

## 2022-12-05 VITALS — HEIGHT: 64 IN | BODY MASS INDEX: 30.56 KG/M2 | WEIGHT: 179 LBS

## 2022-12-05 DIAGNOSIS — M20.42 HAMMER TOE OF LEFT FOOT: Primary | ICD-10-CM

## 2022-12-05 DIAGNOSIS — L08.9 INFECTION OF TOE: ICD-10-CM

## 2022-12-05 NOTE — PROGRESS NOTES
Patient presents for consent signing  Patient is scheduled for amputation of the left 4th toe due to the severe hammertoe deformity and the suspected osteomyelitis  Patient attempted return to a regular shoe and could not wear it  She is back in the surgical shoe  The procedure was explained including pre and postoperative course, risks and complications  The consent form was signed

## 2022-12-06 ENCOUNTER — CONSULT (OUTPATIENT)
Dept: INTERNAL MEDICINE CLINIC | Facility: CLINIC | Age: 62
End: 2022-12-06

## 2022-12-06 VITALS
OXYGEN SATURATION: 97 % | BODY MASS INDEX: 31.14 KG/M2 | HEIGHT: 64 IN | HEART RATE: 97 BPM | DIASTOLIC BLOOD PRESSURE: 84 MMHG | WEIGHT: 182.4 LBS | SYSTOLIC BLOOD PRESSURE: 138 MMHG

## 2022-12-06 DIAGNOSIS — Z01.818 PREOP EXAM FOR INTERNAL MEDICINE: Primary | ICD-10-CM

## 2022-12-06 PROBLEM — G40.909 NONINTRACTABLE EPILEPSY WITHOUT STATUS EPILEPTICUS, UNSPECIFIED EPILEPSY TYPE (HCC): Status: ACTIVE | Noted: 2022-12-06

## 2022-12-06 NOTE — LETTER
December 6, 2022    [address] [address]      Dear Ms Plascencia:    ***    If you have any questions or concerns, please don't hesitate to call      Sincerely,             Johnathan García MD        CC:   No Recipients

## 2022-12-06 NOTE — PATIENT INSTRUCTIONS
Problem List Items Addressed This Visit          Other    Preop exam for internal medicine - Primary     Pt medically cleared for procedures of EGD and toe amputation  No cardiopulm complaints  EKG today shows Normal sinus rhythm at 86 beats per minute, normal axis, no ischemic changes, EKG ok           Relevant Orders    POCT ECG (Completed)

## 2022-12-06 NOTE — LETTER
2022     Barbara Ibarra, [address]    Patient: Ping Liu   YOB: 1960   Date of Visit: 2022       Dear Dr Jelly Bhatia Recipients: Thank you for referring Ping Liu to me for evaluation  Below are my notes for this consultation  If you have questions, please do not hesitate to call me  I look forward to following your patient along with you  Sincerely,        Josesito Garcia MD        CC: MD Josesito Cai MD  2022  3:03 PM  Incomplete  Name: Ping Liu      : 1960      MRN: [id number]  Encounter Provider: Josesito Garcia MD  Encounter Date: 2022   [facility] [place]    Assessment & Plan     1  Preop exam for internal medicine  Assessment & Plan:  Pt medically cleared for procedures of EGD and toe amputation  No cardiopulm complaints  EKG today shows Normal sinus rhythm at 86 beats per minute, normal axis, no ischemic changes, EKG ok  Orders:  -     POCT ECG         Subjective     Pt is going for two separate procedures: toe amputation and EGD  Cardiopulmonary complaints, no chest pain, no shortness of breath  Patient has had bleeding problems and anemia issues  Patient not on any blood thinners  No problems with anesthesia in the past     Review of Systems   Constitutional: Positive for fatigue (mild)  Negative for chills and fever  HENT: Negative for congestion, nosebleeds, postnasal drip, sore throat and trouble swallowing  Eyes: Negative for pain  Respiratory: Negative for cough, chest tightness, shortness of breath and wheezing  Cardiovascular: Negative for chest pain, palpitations and leg swelling  Gastrointestinal: Negative for abdominal pain, constipation, diarrhea, nausea and vomiting  Endocrine: Negative for polydipsia and polyuria  Genitourinary: Negative for dysuria, flank pain and hematuria  Musculoskeletal: Negative for arthralgias  Skin: Negative for rash  Neurological: Positive for light-headedness (occasional)  Negative for dizziness, tremors and headaches  Hematological: Does not bruise/bleed easily  Psychiatric/Behavioral: Negative for confusion and dysphoric mood  The patient is not nervous/anxious  Past Medical History:   Diagnosis Date   • Anxiety    • Arthritis    • Back pain at L4-L5 level    • Schreiber esophagus    • Bulimia    • Colon polyp    • Disease of thyroid gland     hypothyroid   • Ear problems    • GERD (gastroesophageal reflux disease)    • History of transfusion    • Hyperlipidemia    • Hypothyroidism    • Leukopenia    • Nasal congestion    • NMS (neuroleptic malignant syndrome) 01/03/2020   • Pneumonia    • Rheumatoid arthritis involving right hip (HCC)    • Sciatica    • Seizure ([facility] [address] )     due to medication mix up 8/2018 only one historically   • Seizures ([facility] [address] )    • Synovial cyst of lumbar spine    • Vertigo    • Weight gain      Past Surgical History:   Procedure Laterality Date   • BONE MARROW BIOPSY     • BREAST SURGERY      enlargement with implants   • CLOSED REDUCTION DISTAL FEMUR FRACTURE     • COLONOSCOPY     • COSMETIC SURGERY     • DENTAL SURGERY     • HIP ARTHROPLASTY Right 1/2/2020    Procedure: REVISION TOTAL HIP ARTHROPLASTY WITH REPAIR OF PARIPROSTHETIC FRACTURE - POSTERIOR APPROACH;  Surgeon: Kadie Montoya MD;  [facility];  Service: Orthopedics   • MI ESOPHAGOGASTRODUODENOSCOPY TRANSORAL DIAGNOSTIC N/A 5/11/2021    Procedure: ESOPHAGOGASTRODUODENOSCOPY (EGD); Surgeon:  Samina Rosenberg MD;  [facility];  Service: General   • MI ESOPHAGUS SURG PROCEDURE UNLISTED N/A 5/11/2021    Procedure: FUNDOPLICATION TRANSORAL INCISIONLESS;  Surgeon: Grover Kraft MD;  [facility];  Service: Gastroenterology   • MI LAP, REPAIR PARAESOPHAGEAL HERNIA, INCL FUNDOPLASTY W/ MESH N/A 5/11/2021    Procedure: REPAIR HERNIA PARAESOPHAGEAL LAPAROSCOPIC;  Surgeon: Dilan Spear MD;  [facility];  Service: General   • ME TOTAL HIP ARTHROPLASTY Right 12/11/2019    Procedure: ARTHROPLASTY HIP TOTAL ANTERIOR;  Surgeon: Waqar Fernandes MD;  [facility];  Service: Orthopedics   • RHINOPLASTY     • SINUS SURGERY     • TRANSORAL INCISIONLESS FUNDOPLICATION (TIF)  93/87/1354     Family History   Problem Relation Age of Onset   • Uterine cancer Mother    • Esophageal cancer Father    • Colon cancer Maternal Grandmother      Social History     Socioeconomic History   • Marital status: Single     Spouse name: None   • Number of children: None   • Years of education: None   • Highest education level: None   Occupational History   • None   Tobacco Use   • Smoking status: Never   • Smokeless tobacco: Never   Vaping Use   • Vaping Use: Never used   Substance and Sexual Activity   • Alcohol use: Not Currently   • Drug use: Not Currently   • Sexual activity: Not Currently   Other Topics Concern   • None   Social History Narrative    [facility]     Social Determinants of Health     Financial Resource Strain: Not on file   Food Insecurity: No Food Insecurity   • Worried About Running Out of Food in the Last Year: Never true   • Ran Out of Food in the Last Year: Never true   Transportation Needs: No Transportation Needs   • Lack of Transportation (Medical): No   • Lack of Transportation (Non-Medical):  No   Physical Activity: Not on file   Stress: Not on file   Social Connections: Not on file   Intimate Partner Violence: Not on file   Housing Stability: Low Risk    • Unable to Pay for Housing in the Last Year: No   • Number of Places Lived in the Last Year: 1   • Unstable Housing in the Last Year: No     Current Outpatient Medications on File Prior to Visit   Medication Sig   • acetaminophen (TYLENOL) 325 mg tablet Take 3 tablets (975 mg total) by mouth every 8 (eight) hours   • amLODIPine (NORVASC) 5 mg tablet TAKE 1 TABLET (5 MG TOTAL) BY MOUTH DAILY  • ferrous sulfate 325 (65 Fe) mg tablet Take 1 tablet (325 mg total) by mouth 2 (two) times a day with meals   • levothyroxine 25 mcg tablet TAKE 1 TABLET BY MOUTH EVERY DAY   • LORazepam (ATIVAN) 2 mg tablet Take 1 tablet (2 mg total) by mouth every 6 (six) hours as needed for anxiety   • ondansetron (ZOFRAN) 4 mg tablet Take 1 tablet (4 mg total) by mouth every 8 (eight) hours as needed for nausea or vomiting   • pantoprazole (PROTONIX) 40 mg tablet Take 1 tablet (40 mg total) by mouth 2 (two) times a day   • PARoxetine (PAXIL) 30 mg tablet Take 2 tablets (60mg total) by mouth daily   • QUEtiapine (SEROquel XR) 400 mg 24 hr tablet TAKE 1 TABLET (400 MG TOTAL) BY MOUTH DAILY AT BEDTIME   • sucralfate (CARAFATE) 1 g tablet TAKE 1 TABLET (1 G TOTAL) BY MOUTH 3 (THREE) TIMES A DAY WITH MEALS   • triamcinolone (KENALOG) 0 1 % cream For acute flares, apply topically twice daily for up to 2 weeks at a time and then take one week off  DO NOT use on the face, armpits, and groin area  When not flaring, can use 1-2 times weekly on areas prone to rash     • ziprasidone (GEODON) 80 mg capsule TAKE 1 CAPSULE BY MOUTH EVERY DAY   • mupirocin (BACTROBAN) 2 % ointment Apply topically 2 (two) times a day (Patient not taking: Reported on 12/6/2022)     Allergies   Allergen Reactions   • Risperdal [Risperidone] Shortness Of Breath     Rapid heart beat, SOB   • Zyprexa [Olanzapine] Shortness Of Breath     Rapid heartbeat   • Latex Rash     Band aids, adhesives wears normal underwear, can eat bananas  USE PAPER TAPE     Immunization History   Administered Date(s) Administered   • COVID-19 PFIZER VACCINE 0 3 ML IM 03/20/2021, 04/10/2021, 10/09/2021   • H1N1, All Formulations 11/17/2009   • INFLUENZA 11/17/2009, 01/04/2013, 11/11/2014, 10/20/2015, 10/31/2016, 09/16/2018   • Influenza Injectable, MDCK, Preservative Free, Quadrivalent, 0 5 mL 09/26/2019   • Influenza Quadrivalent, 6-35 Months IM 10/31/2016   • Influenza, injectable, quadrivalent, preservative free 0 5 mL 2020   • Influenza, recombinant, quadrivalent,injectable, preservative free 2021   • Pneumococcal Conjugate 13-Valent 2018   • Rabies 2014, 2014, 2014   • Tdap 2014       Objective     /84   Pulse 97   Ht 5' 4" (1 626 m)   Wt 82 7 kg (182 lb 6 4 oz)   SpO2 97%   BMI 31 31 kg/m²     Physical Exam  Vitals reviewed  Constitutional:       General: She is not in acute distress  Appearance: Normal appearance  She is well-developed and well-nourished  HENT:      Head: Normocephalic and atraumatic  Right Ear: External ear normal       Left Ear: External ear normal    Eyes:      General: No scleral icterus  Conjunctiva/sclera: Conjunctivae normal    Neck:      Thyroid: No thyromegaly  Trachea: No tracheal deviation  Cardiovascular:      Rate and Rhythm: Normal rate and regular rhythm  Heart sounds: Normal heart sounds  No murmur heard  Pulmonary:      Effort: Pulmonary effort is normal  No respiratory distress  Breath sounds: Normal breath sounds  No wheezing or rales  Abdominal:      General: Bowel sounds are normal       Palpations: Abdomen is soft  Tenderness: There is no abdominal tenderness  There is no guarding or rebound  Musculoskeletal:         General: No edema  Cervical back: Normal range of motion and neck supple  Right lower leg: No edema  Left lower leg: No edema  Lymphadenopathy:      Cervical: No cervical adenopathy  Skin:     Coloration: Skin is not jaundiced  Neurological:      Mental Status: She is alert and oriented to person, place, and time     Psychiatric:         Mood and Affect: Mood and affect normal        MD Usman Corey MD  2022  2:45 PM  Sign when Signing Visit  Name: Brando Posadas      : 1960      MRN: [id number]  Encounter Provider: Usman Mathews MD Encounter Date: 12/6/2022   [facility] [address]     1  Preop exam for internal medicine  -     POCT ECG         Subjective     Pt is going for two separate procedures: toe amputation and EGD  Cardiopulmonary complaints, no chest pain, no shortness of breath  Patient has had bleeding problems and anemia issues  Patient not on any blood thinners  No problems with anesthesia in the past     Review of Systems   Constitutional: Positive for fatigue (mild)  Negative for chills and fever  HENT: Negative for congestion, nosebleeds, postnasal drip, sore throat and trouble swallowing  Eyes: Negative for pain  Respiratory: Negative for cough, chest tightness, shortness of breath and wheezing  Cardiovascular: Negative for chest pain, palpitations and leg swelling  Gastrointestinal: Negative for abdominal pain, constipation, diarrhea, nausea and vomiting  Endocrine: Negative for polydipsia and polyuria  Genitourinary: Negative for dysuria, flank pain and hematuria  Musculoskeletal: Negative for arthralgias  Skin: Negative for rash  Neurological: Positive for light-headedness (occasional)  Negative for dizziness, tremors and headaches  Hematological: Does not bruise/bleed easily  Psychiatric/Behavioral: Negative for confusion and dysphoric mood  The patient is not nervous/anxious          Past Medical History:   Diagnosis Date   • Anxiety    • Arthritis    • Back pain at L4-L5 level    • Schreiber esophagus    • Bulimia    • Colon polyp    • Disease of thyroid gland     hypothyroid   • Ear problems    • GERD (gastroesophageal reflux disease)    • History of transfusion    • Hyperlipidemia    • Hypothyroidism    • Leukopenia    • Nasal congestion    • NMS (neuroleptic malignant syndrome) 01/03/2020   • Pneumonia    • Rheumatoid arthritis involving right hip ([facility] [address] )    • Sciatica    • Seizure ([facility] [address] )     due to medication mix up 8/2018 only one historically   • Seizures ([facility] [address] )    • Synovial cyst of lumbar spine    • Vertigo    • Weight gain      Past Surgical History:   Procedure Laterality Date   • BONE MARROW BIOPSY     • BREAST SURGERY      enlargement with implants   • CLOSED REDUCTION DISTAL FEMUR FRACTURE     • COLONOSCOPY     • COSMETIC SURGERY     • DENTAL SURGERY     • HIP ARTHROPLASTY Right 1/2/2020    Procedure: REVISION TOTAL HIP ARTHROPLASTY WITH REPAIR OF PARIPROSTHETIC FRACTURE - POSTERIOR APPROACH;  Surgeon: Stevie Green MD;  [facility];  Service: Orthopedics   • NC ESOPHAGOGASTRODUODENOSCOPY TRANSORAL DIAGNOSTIC N/A 5/11/2021    Procedure: ESOPHAGOGASTRODUODENOSCOPY (EGD); Surgeon: Salima Mercedes MD;  [facility];  Service: General   • NC ESOPHAGUS SURG PROCEDURE UNLISTED N/A 5/11/2021    Procedure: FUNDOPLICATION TRANSORAL INCISIONLESS;  Surgeon: Arthor Homans, MD;  [facility];  Service: Gastroenterology   • NC LAP, REPAIR PARAESOPHAGEAL HERNIA, INCL FUNDOPLASTY W/ MESH N/A 5/11/2021    Procedure: REPAIR HERNIA PARAESOPHAGEAL  LAPAROSCOPIC;  Surgeon:  Salima Mercedes MD;  [facility];  Service: General   • NC TOTAL HIP ARTHROPLASTY Right 12/11/2019    Procedure: ARTHROPLASTY HIP TOTAL ANTERIOR;  Surgeon: Stevie Green MD;  [facility];  Service: Orthopedics   • RHINOPLASTY     • SINUS SURGERY     • TRANSORAL INCISIONLESS FUNDOPLICATION (TIF)  64/82/0407     Family History   Problem Relation Age of Onset   • Uterine cancer Mother    • Esophageal cancer Father    • Colon cancer Maternal Grandmother      Social History     Socioeconomic History   • Marital status: Single     Spouse name: None   • Number of children: None   • Years of education: None   • Highest education level: None   Occupational History   • None   Tobacco Use   • Smoking status: Never   • Smokeless tobacco: Never   Vaping Use   • Vaping Use: Never used   Substance and Sexual Activity   • Alcohol use: Not Currently   • Drug use: Not Currently   • Sexual activity: Not Currently   Other Topics Concern   • None   Social History Narrative    Family disruption death of family member parent, per [facility]     Social Determinants of Health     Financial Resource Strain: Not on file   Food Insecurity: No Food Insecurity   • Worried About Running Out of Food in the Last Year: Never true   • Ran Out of Food in the Last Year: Never true   Transportation Needs: No Transportation Needs   • Lack of Transportation (Medical): No   • Lack of Transportation (Non-Medical): No   Physical Activity: Not on file   Stress: Not on file   Social Connections: Not on file   Intimate Partner Violence: Not on file   Housing Stability: Low Risk    • Unable to Pay for Housing in the Last Year: No   • Number of Places Lived in the Last Year: 1   • Unstable Housing in the Last Year: No     Current Outpatient Medications on File Prior to Visit   Medication Sig   • acetaminophen (TYLENOL) 325 mg tablet Take 3 tablets (975 mg total) by mouth every 8 (eight) hours   • amLODIPine (NORVASC) 5 mg tablet TAKE 1 TABLET (5 MG TOTAL) BY MOUTH DAILY     • ferrous sulfate 325 (65 Fe) mg tablet Take 1 tablet (325 mg total) by mouth 2 (two) times a day with meals   • levothyroxine 25 mcg tablet TAKE 1 TABLET BY MOUTH EVERY DAY   • LORazepam (ATIVAN) 2 mg tablet Take 1 tablet (2 mg total) by mouth every 6 (six) hours as needed for anxiety   • ondansetron (ZOFRAN) 4 mg tablet Take 1 tablet (4 mg total) by mouth every 8 (eight) hours as needed for nausea or vomiting   • pantoprazole (PROTONIX) 40 mg tablet Take 1 tablet (40 mg total) by mouth 2 (two) times a day   • PARoxetine (PAXIL) 30 mg tablet Take 2 tablets (60mg total) by mouth daily   • QUEtiapine (SEROquel XR) 400 mg 24 hr tablet TAKE 1 TABLET (400 MG TOTAL) BY MOUTH DAILY AT BEDTIME   • sucralfate (CARAFATE) 1 g tablet TAKE 1 TABLET (1 G TOTAL) BY MOUTH 3 (THREE) TIMES A DAY WITH MEALS   • triamcinolone (KENALOG) 0 1 % cream For acute flares, apply topically twice daily for up to 2 weeks at a time and then take one week off  DO NOT use on the face, armpits, and groin area  When not flaring, can use 1-2 times weekly on areas prone to rash  • ziprasidone (GEODON) 80 mg capsule TAKE 1 CAPSULE BY MOUTH EVERY DAY   • mupirocin (BACTROBAN) 2 % ointment Apply topically 2 (two) times a day (Patient not taking: Reported on 12/6/2022)     Allergies   Allergen Reactions   • Risperdal [Risperidone] Shortness Of Breath     Rapid heart beat, SOB   • Zyprexa [Olanzapine] Shortness Of Breath     Rapid heartbeat   • Latex Rash     Band aids, adhesives wears normal underwear, can eat bananas  USE PAPER TAPE     Immunization History   Administered Date(s) Administered   • COVID-19 PFIZER VACCINE 0 3 ML IM 03/20/2021, 04/10/2021, 10/09/2021   • H1N1, All Formulations 11/17/2009   • INFLUENZA 11/17/2009, 01/04/2013, 11/11/2014, 10/20/2015, 10/31/2016, 09/16/2018   • Influenza Injectable, MDCK, Preservative Free, Quadrivalent, 0 5 mL 09/26/2019   • Influenza Quadrivalent, 6-35 Months IM 10/31/2016   • Influenza, injectable, quadrivalent, preservative free 0 5 mL 09/05/2020   • Influenza, recombinant, quadrivalent,injectable, preservative free 11/04/2021   • Pneumococcal Conjugate 13-Valent 09/16/2018   • Rabies 07/29/2014, 08/01/2014, 08/05/2014   • Tdap 07/29/2014       Objective     /84   Pulse 97   Ht 5' 4" (1 626 m)   Wt 82 7 kg (182 lb 6 4 oz)   SpO2 97%   BMI 31 31 kg/m²     Physical Exam  Vitals reviewed         Usman Mathews MD

## 2022-12-06 NOTE — ASSESSMENT & PLAN NOTE
Pt medically cleared for procedures of EGD and toe amputation  No cardiopulm complaints  EKG today shows Normal sinus rhythm at 86 beats per minute, normal axis, no ischemic changes, EKG ok

## 2022-12-06 NOTE — PROGRESS NOTES
Name: Ethel Lucero      : 1960      MRN: [id number]  Encounter Provider: Aracelis Alston MD  Encounter Date: 2022   Encounter department: [facility]    Assessment & Plan     1  Preop exam for internal medicine  Assessment & Plan:  Pt medically cleared for procedures of EGD and toe amputation  No cardiopulm complaints  EKG today shows Normal sinus rhythm at 86 beats per minute, normal axis, no ischemic changes, EKG ok  Orders:  -     POCT ECG           Subjective     Pt is going for two separate procedures: toe amputation and EGD  Cardiopulmonary complaints, no chest pain, no shortness of breath  Patient has had bleeding problems and anemia issues  Patient not on any blood thinners  No problems with anesthesia in the past     Review of Systems   Constitutional: Positive for fatigue (mild)  Negative for chills and fever  HENT: Negative for congestion, nosebleeds, postnasal drip, sore throat and trouble swallowing  Eyes: Negative for pain  Respiratory: Negative for cough, chest tightness, shortness of breath and wheezing  Cardiovascular: Negative for chest pain, palpitations and leg swelling  Gastrointestinal: Negative for abdominal pain, constipation, diarrhea, nausea and vomiting  Endocrine: Negative for polydipsia and polyuria  Genitourinary: Negative for dysuria, flank pain and hematuria  Musculoskeletal: Negative for arthralgias  Skin: Negative for rash  Neurological: Positive for light-headedness (occasional)  Negative for dizziness, tremors and headaches  Hematological: Does not bruise/bleed easily  Psychiatric/Behavioral: Negative for confusion and dysphoric mood  The patient is not nervous/anxious          Past Medical History:   Diagnosis Date   • Anxiety    • Arthritis    • Back pain at L4-L5 level    • Schreiber esophagus    • Bulimia    • Colon polyp    • Disease of thyroid gland     hypothyroid   • Ear problems    • GERD (gastroesophageal reflux disease)    • History of transfusion    • Hyperlipidemia    • Hypothyroidism    • Leukopenia    • Nasal congestion    • NMS (neuroleptic malignant syndrome) 01/03/2020   • Pneumonia    • Rheumatoid arthritis involving right hip (HCC)    • Sciatica    • Seizure ([address] )     due to medication mix up 8/2018 only one historically   • Seizures (HCC)    • Synovial cyst of lumbar spine    • Vertigo    • Weight gain      Past Surgical History:   Procedure Laterality Date   • BONE MARROW BIOPSY     • BREAST SURGERY      enlargement with implants   • CLOSED REDUCTION DISTAL FEMUR FRACTURE     • COLONOSCOPY     • COSMETIC SURGERY     • DENTAL SURGERY     • HIP ARTHROPLASTY Right 1/2/2020    Procedure: REVISION TOTAL HIP ARTHROPLASTY WITH REPAIR OF PARIPROSTHETIC FRACTURE - POSTERIOR APPROACH;  Surgeon: Savannah Henson MD;  [facility];  Service: Orthopedics   • HI ESOPHAGOGASTRODUODENOSCOPY TRANSORAL DIAGNOSTIC N/A 5/11/2021    Procedure: ESOPHAGOGASTRODUODENOSCOPY (EGD); Surgeon: Fermin Mackenzie MD;  [facility];  Service: General   • HI ESOPHAGUS SURG PROCEDURE UNLISTED N/A 5/11/2021    Procedure: FUNDOPLICATION TRANSORAL INCISIONLESS;  Surgeon: Romi Cruz MD;  [facility];  Service: Gastroenterology   • HI LAP, REPAIR PARAESOPHAGEAL HERNIA, INCL FUNDOPLASTY W/ MESH N/A 5/11/2021    Procedure: REPAIR HERNIA PARAESOPHAGEAL  LAPAROSCOPIC;  Surgeon:  Fermin Mackenzie MD;  [facility];  Service: General   • HI TOTAL HIP ARTHROPLASTY Right 12/11/2019    Procedure: ARTHROPLASTY HIP TOTAL ANTERIOR;  Surgeon: Savannah Henson MD;  [facility];  Service: Orthopedics   • RHINOPLASTY     • SINUS SURGERY     • TRANSORAL INCISIONLESS FUNDOPLICATION (TIF)  39/68/0664     Family History   Problem Relation Age of Onset   • Uterine cancer Mother    • Esophageal cancer Father    • Colon cancer Maternal Grandmother      Social History     Socioeconomic History   • Marital status: Single     Spouse name: None   • Number of children: None   • Years of education: None   • Highest education level: None   Occupational History   • None   Tobacco Use   • Smoking status: Never   • Smokeless tobacco: Never   Vaping Use   • Vaping Use: Never used   Substance and Sexual Activity   • Alcohol use: Not Currently   • Drug use: Not Currently   • Sexual activity: Not Currently   Other Topics Concern   • None   Social History Narrative    Family disruption death of family member parent, per allscripts     Social Determinants of Health     Financial Resource Strain: Not on file   Food Insecurity: No Food Insecurity   • Worried About Running Out of Food in the Last Year: Never true   • Ran Out of Food in the Last Year: Never true   Transportation Needs: No Transportation Needs   • Lack of Transportation (Medical): No   • Lack of Transportation (Non-Medical): No   Physical Activity: Not on file   Stress: Not on file   Social Connections: Not on file   Intimate Partner Violence: Not on file   Housing Stability: Low Risk    • Unable to Pay for Housing in the Last Year: No   • Number of Places Lived in the Last Year: 1   • Unstable Housing in the Last Year: No     Current Outpatient Medications on File Prior to Visit   Medication Sig   • acetaminophen (TYLENOL) 325 mg tablet Take 3 tablets (975 mg total) by mouth every 8 (eight) hours   • amLODIPine (NORVASC) 5 mg tablet TAKE 1 TABLET (5 MG TOTAL) BY MOUTH DAILY     • ferrous sulfate 325 (65 Fe) mg tablet Take 1 tablet (325 mg total) by mouth 2 (two) times a day with meals   • levothyroxine 25 mcg tablet TAKE 1 TABLET BY MOUTH EVERY DAY   • LORazepam (ATIVAN) 2 mg tablet Take 1 tablet (2 mg total) by mouth every 6 (six) hours as needed for anxiety   • ondansetron (ZOFRAN) 4 mg tablet Take 1 tablet (4 mg total) by mouth every 8 (eight) hours as needed for nausea or vomiting   • pantoprazole (PROTONIX) 40 mg tablet Take 1 tablet (40 mg total) by mouth 2 (two) times a day   • PARoxetine (PAXIL) 30 mg tablet Take 2 tablets (60mg total) by mouth daily   • QUEtiapine (SEROquel XR) 400 mg 24 hr tablet TAKE 1 TABLET (400 MG TOTAL) BY MOUTH DAILY AT BEDTIME   • sucralfate (CARAFATE) 1 g tablet TAKE 1 TABLET (1 G TOTAL) BY MOUTH 3 (THREE) TIMES A DAY WITH MEALS   • triamcinolone (KENALOG) 0 1 % cream For acute flares, apply topically twice daily for up to 2 weeks at a time and then take one week off  DO NOT use on the face, armpits, and groin area  When not flaring, can use 1-2 times weekly on areas prone to rash  • ziprasidone (GEODON) 80 mg capsule TAKE 1 CAPSULE BY MOUTH EVERY DAY   • mupirocin (BACTROBAN) 2 % ointment Apply topically 2 (two) times a day (Patient not taking: Reported on 12/6/2022)     Allergies   Allergen Reactions   • Risperdal [Risperidone] Shortness Of Breath     Rapid heart beat, SOB   • Zyprexa [Olanzapine] Shortness Of Breath     Rapid heartbeat   • Latex Rash     Band aids, adhesives wears normal underwear, can eat bananas  USE PAPER TAPE     Immunization History   Administered Date(s) Administered   • COVID-19 PFIZER VACCINE 0 3 ML IM 03/20/2021, 04/10/2021, 10/09/2021   • H1N1, All Formulations 11/17/2009   • INFLUENZA 11/17/2009, 01/04/2013, 11/11/2014, 10/20/2015, 10/31/2016, 09/16/2018   • Influenza Injectable, MDCK, Preservative Free, Quadrivalent, 0 5 mL 09/26/2019   • Influenza Quadrivalent, 6-35 Months IM 10/31/2016   • Influenza, injectable, quadrivalent, preservative free 0 5 mL 09/05/2020   • Influenza, recombinant, quadrivalent,injectable, preservative free 11/04/2021   • Pneumococcal Conjugate 13-Valent 09/16/2018   • Rabies 07/29/2014, 08/01/2014, 08/05/2014   • Tdap 07/29/2014       Objective     /84   Pulse 97   Ht 5' 4" (1 626 m)   Wt 82 7 kg (182 lb 6 4 oz)   SpO2 97%   BMI 31 31 kg/m²     Physical Exam  Vitals reviewed  Constitutional:       General: She is not in acute distress  Appearance: Normal appearance  She is well-developed and well-nourished  HENT:      Head: Normocephalic and atraumatic  Right Ear: External ear normal       Left Ear: External ear normal    Eyes:      General: No scleral icterus  Conjunctiva/sclera: Conjunctivae normal    Neck:      Thyroid: No thyromegaly  Trachea: No tracheal deviation  Cardiovascular:      Rate and Rhythm: Normal rate and regular rhythm  Heart sounds: Normal heart sounds  No murmur heard  Pulmonary:      Effort: Pulmonary effort is normal  No respiratory distress  Breath sounds: Normal breath sounds  No wheezing or rales  Abdominal:      General: Bowel sounds are normal       Palpations: Abdomen is soft  Tenderness: There is no abdominal tenderness  There is no guarding or rebound  Musculoskeletal:         General: No edema  Cervical back: Normal range of motion and neck supple  Right lower leg: No edema  Left lower leg: No edema  Lymphadenopathy:      Cervical: No cervical adenopathy  Skin:     Coloration: Skin is not jaundiced  Neurological:      Mental Status: She is alert and oriented to person, place, and time     Psychiatric:         Mood and Affect: Mood and affect normal        Eloina Terry MD

## 2022-12-07 LAB
BASOPHILS # BLD AUTO: 70 CELLS/UL (ref 0–200)
BASOPHILS NFR BLD AUTO: 1.4 %
EOSINOPHIL # BLD AUTO: 200 CELLS/UL (ref 15–500)
EOSINOPHIL NFR BLD AUTO: 4 %
ERYTHROCYTE [DISTWIDTH] IN BLOOD BY AUTOMATED COUNT: 32.6 % (ref 11–15)
HCT VFR BLD AUTO: 36 % (ref 35–45)
HGB BLD-MCNC: 10.5 G/DL (ref 11.7–15.5)
LYMPHOCYTES # BLD AUTO: 880 CELLS/UL (ref 850–3900)
LYMPHOCYTES NFR BLD AUTO: 17.6 %
MCH RBC QN AUTO: 19.5 PG (ref 27–33)
MCHC RBC AUTO-ENTMCNC: 29.2 G/DL (ref 32–36)
MCV RBC AUTO: 66.9 FL (ref 80–100)
MONOCYTES # BLD AUTO: 515 CELLS/UL (ref 200–950)
MONOCYTES NFR BLD AUTO: 10.3 %
NEUTROPHILS # BLD AUTO: 3335 CELLS/UL (ref 1500–7800)
NEUTROPHILS NFR BLD AUTO: 66.7 %
PLATELET # BLD AUTO: 440 THOUSAND/UL (ref 140–400)
PMV BLD REES-ECKER: 8.8 FL (ref 7.5–12.5)
RBC # BLD AUTO: 5.38 MILLION/UL (ref 3.8–5.1)
WBC # BLD AUTO: 5 THOUSAND/UL (ref 3.8–10.8)

## 2022-12-09 ENCOUNTER — TELEPHONE (OUTPATIENT)
Dept: GASTROENTEROLOGY | Facility: CLINIC | Age: 62
End: 2022-12-09

## 2022-12-09 ENCOUNTER — TELEPHONE (OUTPATIENT)
Dept: INTERNAL MEDICINE CLINIC | Facility: CLINIC | Age: 62
End: 2022-12-09

## 2022-12-09 NOTE — TELEPHONE ENCOUNTER
I called to give the patient results  She told me she gets dizzy  The patient thinks it is anxiety due to her upcoming surgeries  The patient did not want to speak to any one

## 2022-12-09 NOTE — TELEPHONE ENCOUNTER
Called and spoke to pt confirming procedure on 12/12/22  Pt is aware [id number] will be calling later today with arrival time  Pt has instructions and did not have any questions

## 2022-12-12 ENCOUNTER — ANESTHESIA EVENT (OUTPATIENT)
Dept: GASTROENTEROLOGY | Facility: HOSPITAL | Age: 62
End: 2022-12-12

## 2022-12-12 ENCOUNTER — HOSPITAL ENCOUNTER (OUTPATIENT)
Dept: GASTROENTEROLOGY | Facility: HOSPITAL | Age: 62
Setting detail: OUTPATIENT SURGERY
Discharge: HOME | End: 2022-12-12
Attending: INTERNAL MEDICINE

## 2022-12-12 ENCOUNTER — ANESTHESIA (OUTPATIENT)
Dept: GASTROENTEROLOGY | Facility: HOSPITAL | Age: 62
End: 2022-12-12

## 2022-12-12 VITALS
HEIGHT: 64 IN | HEART RATE: 90 BPM | RESPIRATION RATE: 16 BRPM | TEMPERATURE: 98 F | WEIGHT: 176 LBS | OXYGEN SATURATION: 95 % | BODY MASS INDEX: 30.05 KG/M2 | DIASTOLIC BLOOD PRESSURE: 70 MMHG | SYSTOLIC BLOOD PRESSURE: 122 MMHG

## 2022-12-12 DIAGNOSIS — K21.9 GASTROESOPHAGEAL REFLUX DISEASE WITHOUT ESOPHAGITIS: ICD-10-CM

## 2022-12-12 DIAGNOSIS — K22.11 ULCER OF ESOPHAGUS WITH BLEEDING: ICD-10-CM

## 2022-12-12 DIAGNOSIS — R11.14 BILIOUS VOMITING WITH NAUSEA: ICD-10-CM

## 2022-12-12 DIAGNOSIS — R11.2 NAUSEA AND VOMITING, UNSPECIFIED VOMITING TYPE: Primary | ICD-10-CM

## 2022-12-12 DIAGNOSIS — K22.70 BARRETT'S ESOPHAGUS WITHOUT DYSPLASIA: ICD-10-CM

## 2022-12-12 RX ORDER — PROPOFOL 10 MG/ML
INJECTION, EMULSION INTRAVENOUS AS NEEDED
Status: DISCONTINUED | OUTPATIENT
Start: 2022-12-12 | End: 2022-12-12

## 2022-12-12 RX ORDER — SODIUM CHLORIDE, SODIUM LACTATE, POTASSIUM CHLORIDE, CALCIUM CHLORIDE 600; 310; 30; 20 MG/100ML; MG/100ML; MG/100ML; MG/100ML
INJECTION, SOLUTION INTRAVENOUS CONTINUOUS PRN
Status: DISCONTINUED | OUTPATIENT
Start: 2022-12-12 | End: 2022-12-12

## 2022-12-12 RX ORDER — LIDOCAINE HYDROCHLORIDE 10 MG/ML
INJECTION, SOLUTION EPIDURAL; INFILTRATION; INTRACAUDAL; PERINEURAL AS NEEDED
Status: DISCONTINUED | OUTPATIENT
Start: 2022-12-12 | End: 2022-12-12

## 2022-12-12 RX ADMIN — PROPOFOL 50 MG: 10 INJECTION, EMULSION INTRAVENOUS at 10:08

## 2022-12-12 RX ADMIN — PROPOFOL 50 MG: 10 INJECTION, EMULSION INTRAVENOUS at 10:04

## 2022-12-12 RX ADMIN — PROPOFOL 50 MG: 10 INJECTION, EMULSION INTRAVENOUS at 10:01

## 2022-12-12 RX ADMIN — PROPOFOL 100 MG: 10 INJECTION, EMULSION INTRAVENOUS at 09:59

## 2022-12-12 RX ADMIN — SODIUM CHLORIDE, SODIUM LACTATE, POTASSIUM CHLORIDE, AND CALCIUM CHLORIDE: .6; .31; .03; .02 INJECTION, SOLUTION INTRAVENOUS at 09:54

## 2022-12-12 RX ADMIN — LIDOCAINE HYDROCHLORIDE 80 MG: 10 INJECTION, SOLUTION EPIDURAL; INFILTRATION; INTRACAUDAL at 09:59

## 2022-12-12 NOTE — H&P
History and Physical - [facility]  Yumiko Lozano 58 y o  female MRN: [id number]                  HPI: Yumiko Lozano is a 58y o  year old female who presents for intractable nausea, vomiting, history of TIF along with a hiatal hernia repair      REVIEW OF SYSTEMS: Per the HPI, and otherwise unremarkable  Historical Information   Past Medical History:   Diagnosis Date   • Anxiety    • Arthritis    • Back pain at L4-L5 level    • Schreiber esophagus    • Bulimia    • Colon polyp    • Disease of thyroid gland     hypothyroid   • Ear problems    • GERD (gastroesophageal reflux disease)    • History of transfusion    • Hyperlipidemia    • Hypothyroidism    • Leukopenia    • Nasal congestion    • NMS (neuroleptic malignant syndrome) 01/03/2020   • Pneumonia    • Rheumatoid arthritis involving right hip (HCC)    • Sciatica    • Seizure ([address] )     due to medication mix up 8/2018 only one historically   • Se[address] [address] )    • Synovial cyst of lumbar spine    • Vertigo    • Weight gain      Past Surgical History:   Procedure Laterality Date   • BONE MARROW BIOPSY     • BREAST SURGERY      enlargement with implants   • CLOSED REDUCTION DISTAL FEMUR FRACTURE     • COLONOSCOPY     • COSMETIC SURGERY     • DENTAL SURGERY     • HIP ARTHROPLASTY Right 1/2/2020    Procedure: REVISION TOTAL HIP ARTHROPLASTY WITH REPAIR OF PARIPROSTHETIC FRACTURE - POSTERIOR APPROACH;  Surgeon: Keyon Duque MD;  [facility];  Service: Orthopedics   • CA ARTHRP ACETBLR/PROX FEM PROSTC AGRFT/ALGRFT Right 12/11/2019    Procedure: ARTHROPLASTY HIP TOTAL ANTERIOR;  Surgeon: Keyon Duque MD;  [facility];  Service: Orthopedics   • CA ESOPHAGOGASTRODUODENOSCOPY TRANSORAL DIAGNOSTIC N/A 5/11/2021    Procedure: ESOPHAGOGASTRODUODENOSCOPY (EGD); Surgeon:  Casper Homans, MD;  [facility];  Service: General   • CA LAPS RPR PARAESPHGL HRNA INCL FUNDPLSTY [address] N/A 5/11/2021    Procedure: REPAIR HERNIA PARAESOPHAGEAL  LAPAROSCOPIC;  Surgeon: Mila Arango MD;  [facility];  Service: General   • SC UNLISTED PROCEDURE ESOPHAGUS N/A 5/11/2021    Procedure: FUNDOPLICATION TRANSORAL INCISIONLESS;  Surgeon: Katherin Harris MD;  [facility];  Service: Gastroenterology   • RHINOPLASTY     • SINUS SURGERY     • TRANSORAL INCISIONLESS FUNDOPLICATION (TIF)  96/08/1262     Social History   Social History     Substance and Sexual Activity   Alcohol Use Not Currently     Social History     Substance and Sexual Activity   Drug Use Not Currently     Social History     Tobacco Use   Smoking Status Never   Smokeless Tobacco Never     Family History   Problem Relation Age of Onset   • Uterine cancer Mother    • Esophageal cancer Father    • Colon cancer Maternal Grandmother        Meds/Allergies     (Not in a hospital admission)      Allergies   Allergen Reactions   • Risperdal [Risperidone] Shortness Of Breath     Rapid heart beat, SOB   • Zyprexa [Olanzapine] Shortness Of Breath     Rapid heartbeat   • Latex Rash     Band aids, adhesives wears normal underwear, can eat bananas  USE PAPER TAPE       Objective     /73   Pulse 94   Temp 98 3 °F (36 8 °C) (Temporal)   Resp 20   Ht 5' 4" (1 626 m)   Wt 79 8 kg (176 lb)   SpO2 95%   BMI 30 21 kg/m²       PHYSICAL EXAM    Gen: NAD  CV: RRR  CHEST: Clear  ABD: soft, NT/ND  EXT: no edema      ASSESSMENT/PLAN:  This is a 58y o  year old female here for EGD, and she is stable and optimized for her procedure

## 2022-12-12 NOTE — ANESTHESIA PREPROCEDURE EVALUATION
Procedure:  EGD    Relevant Problems   CARDIO   (+) Essential hypertension   (+) Hyperlipidemia      ENDO   (+) Acquired hypothyroidism      GI/HEPATIC   (+) Erosive esophagitis   (+) Hiatal hernia   (+) Upper GI bleed      HEMATOLOGY   (+) Iron deficiency anemia due to acute on chronic blood loss   (+) Microcytic anemia   (+) Symptomatic anemia      MUSCULOSKELETAL   (+) Chronic bilateral low back pain without sciatica   (+) DDD (degenerative disc disease), lumbar   (+) Lumbar spondylosis      NEURO/PSYCH   (+) Anxiety   (+) Chronic bilateral low back pain without sciatica   (+) Word finding difficulty             Anesthesia Plan  ASA Score- 2     Anesthesia Type- IV sedation with anesthesia with ASA Monitors  Additional Monitors:   Airway Plan:           Plan Factors-    Chart reviewed  Induction- intravenous  Postoperative Plan-     Informed Consent- Anesthetic plan and risks discussed with patient  I personally reviewed this patient with the CRNA  Discussed and agreed on the Anesthesia Plan with the CRNA  Emelyn Kenyon

## 2022-12-12 NOTE — ANESTHESIA POSTPROCEDURE EVALUATION
Post-Op Assessment Note    CV Status:  Stable  Pain Score: 0    Pain management: adequate     Mental Status:  Arousable   Hydration Status:  Stable   PONV Controlled:  None   Airway Patency:  Patent      Post Op Vitals Reviewed: Yes      Staff: Anesthesiologist, CRNA         No notable events documented      BP   114/65   Temp 98   Pulse 78   Resp 16   SpO2 94 4L MASK

## 2022-12-14 ENCOUNTER — NURSE TRIAGE (OUTPATIENT)
Dept: OTHER | Facility: OTHER | Age: 62
End: 2022-12-14

## 2022-12-14 ENCOUNTER — TELEPHONE (OUTPATIENT)
Dept: INTERNAL MEDICINE CLINIC | Facility: CLINIC | Age: 62
End: 2022-12-14

## 2022-12-14 DIAGNOSIS — R11.14 BILIOUS VOMITING WITH NAUSEA: Primary | ICD-10-CM

## 2022-12-14 RX ORDER — PROCHLORPERAZINE MALEATE 10 MG
10 TABLET ORAL EVERY 6 HOURS PRN
Qty: 30 TABLET | Refills: 0 | Status: SHIPPED | OUTPATIENT
Start: 2022-12-14

## 2022-12-14 NOTE — TELEPHONE ENCOUNTER
Spoke with patient  History of bilious vomiting  With nausea, GERD, rosas's esophagus, ilcer of esophagus, hiatal hernia, bulimia nervosa    Patient c/o continued nausea, worsening d/t social stressors caring for her mother  Taking pantoprazole 40 mg BID, carafate 1g TID, and zofran 4mg QID  Patient feels zofran and reglan have been ineffective  She is looking for another option  Reassured patient stress can continue to trigger her symptoms, and work with PCP to find better options at home  Any suggestions?
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
## 2022-12-14 NOTE — TELEPHONE ENCOUNTER
Regarding: fatigue and nausea  ----- Message from [facility] sent at 12/14/2022 10:48 AM EST -----  "I have been experiencing fatigue and nausea "

## 2022-12-14 NOTE — TELEPHONE ENCOUNTER
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 GI: patient reports Zofran is not helping her nausea  Please follow up with patient  Reason for Disposition  • Nausea lasts > 1 week    Answer Assessment - Initial Assessment Questions  1  NAUSEA SEVERITY: "How bad is the nausea?" (e g , mild, moderate, severe; dehydration, weight loss)    - MILD: loss of appetite without change in eating habits    - MODERATE: decreased oral intake without significant weight loss, dehydration, or malnutrition    - SEVERE: inadequate caloric or fluid intake, significant weight loss, symptoms of dehydration      Severe nausea causing dizziness; interferes with PO intake; last meal was this AM and unable to eat whole meal  2  ONSET: "When did the nausea begin?"     Overnight 12/13-12/14  3  VOMITING: "Any vomiting?" If Yes, ask: "How many times today?"      Denies  4  RECURRENT SYMPTOM: "Have you had nausea before?" If Yes, ask: "When was the last time?" "What happened that time?"      Denies  5  CAUSE: "What do you think is causing the nausea?"    IV sedation from endoscopy 12/12; fatigue overnight while awake with mother with dementia  6   PREGNANCY: "Is there any chance you are pregnant?" (e g , unprotected intercourse, missed birth control pill, broken condom)      Post menopausal    Protocols used: NAUSEA-ADULT-OH

## 2022-12-17 ENCOUNTER — APPOINTMENT (EMERGENCY)
Dept: RADIOLOGY | Facility: HOSPITAL | Age: 62
End: 2022-12-17

## 2022-12-17 ENCOUNTER — HOSPITAL ENCOUNTER (INPATIENT)
Facility: HOSPITAL | Age: 62
LOS: 3 days | Discharge: HOME | End: 2022-12-20
Attending: EMERGENCY MEDICINE | Admitting: INTERNAL MEDICINE

## 2022-12-17 DIAGNOSIS — E87.1 HYPONATREMIA: ICD-10-CM

## 2022-12-17 DIAGNOSIS — E87.20 LACTIC ACIDOSIS: ICD-10-CM

## 2022-12-17 DIAGNOSIS — F25.1 SCHIZOAFFECTIVE DISORDER, DEPRESSIVE TYPE (HCC): ICD-10-CM

## 2022-12-17 DIAGNOSIS — F41.9 ANXIETY: ICD-10-CM

## 2022-12-17 DIAGNOSIS — G93.40 ACUTE ENCEPHALOPATHY: Primary | ICD-10-CM

## 2022-12-17 DIAGNOSIS — F99 PSYCHIATRIC DISORDER: ICD-10-CM

## 2022-12-17 PROBLEM — D75.839 THROMBOCYTOSIS: Status: ACTIVE | Noted: 2022-12-17

## 2022-12-17 PROBLEM — R10.9 ABDOMINAL PAIN: Status: ACTIVE | Noted: 2022-12-17

## 2022-12-17 LAB
2HR DELTA HS TROPONIN: 1 NG/L
ALBUMIN SERPL BCP-MCNC: 4.1 G/DL (ref 3.5–5)
ALP SERPL-CCNC: 110 U/L (ref 46–116)
ALT SERPL W P-5'-P-CCNC: 31 U/L (ref 12–78)
AMPHETAMINES SERPL QL SCN: NEGATIVE
ANION GAP SERPL CALCULATED.3IONS-SCNC: 12 MMOL/L (ref 4–13)
APTT PPP: 24 SECONDS (ref 23–37)
AST SERPL W P-5'-P-CCNC: 29 U/L (ref 5–45)
BARBITURATES UR QL: NEGATIVE
BASE EX.OXY STD BLDV CALC-SCNC: 94.8 % (ref 60–80)
BASE EXCESS BLDV CALC-SCNC: -2.5 MMOL/L
BASOPHILS # BLD AUTO: 0.03 THOUSANDS/ÂΜL (ref 0–0.1)
BASOPHILS NFR BLD AUTO: 0 % (ref 0–1)
BENZODIAZ UR QL: NEGATIVE
BILIRUB SERPL-MCNC: 0.48 MG/DL (ref 0.2–1)
BILIRUB UR QL STRIP: NEGATIVE
BUN SERPL-MCNC: 15 MG/DL (ref 5–25)
CALCIUM SERPL-MCNC: 10.1 MG/DL (ref 8.3–10.1)
CARDIAC TROPONIN I PNL SERPL HS: 3 NG/L
CARDIAC TROPONIN I PNL SERPL HS: 4 NG/L
CHLORIDE SERPL-SCNC: 94 MMOL/L (ref 96–108)
CLARITY UR: CLEAR
CO2 SERPL-SCNC: 22 MMOL/L (ref 21–32)
COCAINE UR QL: NEGATIVE
COLOR UR: ABNORMAL
CREAT SERPL-MCNC: 0.85 MG/DL (ref 0.6–1.3)
D DIMER PPP FEU-MCNC: 3.15 UG/ML FEU
EOSINOPHIL # BLD AUTO: 0.01 THOUSAND/ÂΜL (ref 0–0.61)
EOSINOPHIL NFR BLD AUTO: 0 % (ref 0–6)
FLUAV RNA RESP QL NAA+PROBE: NEGATIVE
FLUBV RNA RESP QL NAA+PROBE: NEGATIVE
GFR SERPL CREATININE-BSD FRML MDRD: 73 ML/MIN/1.73SQ M
GLUCOSE SERPL-MCNC: 109 MG/DL (ref 65–140)
GLUCOSE SERPL-MCNC: 110 MG/DL (ref 65–140)
GLUCOSE SERPL-MCNC: 123 MG/DL (ref 65–140)
GLUCOSE SERPL-MCNC: 97 MG/DL (ref 65–140)
GLUCOSE UR STRIP-MCNC: NEGATIVE MG/DL
HCO3 BLDV-SCNC: 20.5 MMOL/L (ref 24–30)
HCT VFR BLD AUTO: 36.3 % (ref 34.8–46.1)
HGB BLD-MCNC: 10.7 G/DL (ref 11.5–15.4)
HGB UR QL STRIP.AUTO: NEGATIVE
IMM GRANULOCYTES # BLD AUTO: 0.07 THOUSAND/UL (ref 0–0.2)
IMM GRANULOCYTES NFR BLD AUTO: 1 % (ref 0–2)
INR PPP: 0.97 (ref 0.84–1.19)
KETONES UR STRIP-MCNC: NEGATIVE MG/DL
LACTATE SERPL-SCNC: 1.2 MMOL/L (ref 0.5–2)
LACTATE SERPL-SCNC: 2.7 MMOL/L (ref 0.5–2)
LEUKOCYTE ESTERASE UR QL STRIP: NEGATIVE
LIPASE SERPL-CCNC: 97 U/L (ref 73–393)
LYMPHOCYTES # BLD AUTO: 0.59 THOUSANDS/ÂΜL (ref 0.6–4.47)
LYMPHOCYTES NFR BLD AUTO: 4 % (ref 14–44)
MCH RBC QN AUTO: 19.9 PG (ref 26.8–34.3)
MCHC RBC AUTO-ENTMCNC: 29.5 G/DL (ref 31.4–37.4)
MCV RBC AUTO: 68 FL (ref 82–98)
METHADONE UR QL: NEGATIVE
MONOCYTES # BLD AUTO: 0.68 THOUSAND/ÂΜL (ref 0.17–1.22)
MONOCYTES NFR BLD AUTO: 5 % (ref 4–12)
NEUTROPHILS # BLD AUTO: 12.18 THOUSANDS/ÂΜL (ref 1.85–7.62)
NEUTS SEG NFR BLD AUTO: 90 % (ref 43–75)
NITRITE UR QL STRIP: NEGATIVE
NRBC BLD AUTO-RTO: 0 /100 WBCS
O2 CT BLDV-SCNC: 16.2 ML/DL
OPIATES UR QL SCN: NEGATIVE
OXYCODONE+OXYMORPHONE UR QL SCN: NEGATIVE
PCO2 BLDV: 30.2 MM HG (ref 42–50)
PCP UR QL: NEGATIVE
PH BLDV: 7.45 [PH] (ref 7.3–7.4)
PH UR STRIP.AUTO: 6 [PH]
PLATELET # BLD AUTO: 441 THOUSANDS/UL (ref 149–390)
PMV BLD AUTO: 8.5 FL (ref 8.9–12.7)
PO2 BLDV: 83.6 MM HG (ref 35–45)
POTASSIUM SERPL-SCNC: 3.6 MMOL/L (ref 3.5–5.3)
PROCALCITONIN SERPL-MCNC: 0.05 NG/ML
PROT SERPL-MCNC: 8.6 G/DL (ref 6.4–8.4)
PROT UR STRIP-MCNC: NEGATIVE MG/DL
PROTHROMBIN TIME: 13.1 SECONDS (ref 11.6–14.5)
RBC # BLD AUTO: 5.37 MILLION/UL (ref 3.81–5.12)
RSV RNA RESP QL NAA+PROBE: NEGATIVE
SARS-COV-2 RNA RESP QL NAA+PROBE: NEGATIVE
SODIUM SERPL-SCNC: 128 MMOL/L (ref 135–147)
SP GR UR STRIP.AUTO: 1.03 (ref 1–1.03)
THC UR QL: NEGATIVE
TSH SERPL DL<=0.05 MIU/L-ACNC: 1.97 UIU/ML (ref 0.45–4.5)
UROBILINOGEN UR STRIP-ACNC: <2 MG/DL
WBC # BLD AUTO: 13.56 THOUSAND/UL (ref 4.31–10.16)

## 2022-12-17 RX ORDER — LEVOTHYROXINE SODIUM 0.03 MG/1
25 TABLET ORAL
Status: DISCONTINUED | OUTPATIENT
Start: 2022-12-18 | End: 2022-12-20 | Stop reason: HOSPADM

## 2022-12-17 RX ORDER — DEXTROSE AND SODIUM CHLORIDE 5; .9 G/100ML; G/100ML
100 INJECTION, SOLUTION INTRAVENOUS CONTINUOUS
Status: DISCONTINUED | OUTPATIENT
Start: 2022-12-17 | End: 2022-12-17

## 2022-12-17 RX ORDER — OXYCODONE HYDROCHLORIDE 5 MG/1
2.5 TABLET ORAL ONCE
Status: DISCONTINUED | OUTPATIENT
Start: 2022-12-17 | End: 2022-12-20 | Stop reason: HOSPADM

## 2022-12-17 RX ORDER — PANTOPRAZOLE SODIUM 40 MG/10ML
40 INJECTION, POWDER, LYOPHILIZED, FOR SOLUTION INTRAVENOUS
Status: DISCONTINUED | OUTPATIENT
Start: 2022-12-18 | End: 2022-12-19

## 2022-12-17 RX ORDER — ACETAMINOPHEN 325 MG/1
650 TABLET ORAL EVERY 6 HOURS PRN
Status: DISCONTINUED | OUTPATIENT
Start: 2022-12-17 | End: 2022-12-20 | Stop reason: HOSPADM

## 2022-12-17 RX ORDER — SODIUM CHLORIDE 9 MG/ML
50 INJECTION, SOLUTION INTRAVENOUS CONTINUOUS
Status: DISCONTINUED | OUTPATIENT
Start: 2022-12-17 | End: 2022-12-18

## 2022-12-17 RX ORDER — ONDANSETRON 2 MG/ML
4 INJECTION INTRAMUSCULAR; INTRAVENOUS EVERY 6 HOURS PRN
Status: DISCONTINUED | OUTPATIENT
Start: 2022-12-17 | End: 2022-12-20 | Stop reason: SDUPTHER

## 2022-12-17 RX ORDER — LORAZEPAM 2 MG/ML
1 INJECTION INTRAMUSCULAR ONCE
Status: DISCONTINUED | OUTPATIENT
Start: 2022-12-17 | End: 2022-12-17

## 2022-12-17 RX ORDER — LORAZEPAM 2 MG/ML
0.5 INJECTION INTRAMUSCULAR ONCE
Status: COMPLETED | OUTPATIENT
Start: 2022-12-17 | End: 2022-12-17

## 2022-12-17 RX ORDER — HEPARIN SODIUM 5000 [USP'U]/ML
5000 INJECTION, SOLUTION INTRAVENOUS; SUBCUTANEOUS EVERY 8 HOURS SCHEDULED
Status: DISCONTINUED | OUTPATIENT
Start: 2022-12-17 | End: 2022-12-20 | Stop reason: HOSPADM

## 2022-12-17 RX ORDER — SUCRALFATE 1 G/1
1 TABLET ORAL
Status: DISCONTINUED | OUTPATIENT
Start: 2022-12-17 | End: 2022-12-20 | Stop reason: HOSPADM

## 2022-12-17 RX ADMIN — TETANUS TOXOID, REDUCED DIPHTHERIA TOXOID AND ACELLULAR PERTUSSIS VACCINE, ADSORBED 0.5 ML: 5; 2.5; 8; 8; 2.5 SUSPENSION INTRAMUSCULAR at 10:47

## 2022-12-17 RX ADMIN — SODIUM CHLORIDE 75 ML/HR: 0.9 INJECTION, SOLUTION INTRAVENOUS at 17:40

## 2022-12-17 RX ADMIN — LORAZEPAM 0.5 MG: 2 INJECTION INTRAMUSCULAR; INTRAVENOUS at 17:38

## 2022-12-17 RX ADMIN — SODIUM CHLORIDE 1000 ML: 0.9 INJECTION, SOLUTION INTRAVENOUS at 11:40

## 2022-12-17 RX ADMIN — CEFTRIAXONE SODIUM 1000 MG: 10 INJECTION, POWDER, FOR SOLUTION INTRAVENOUS at 11:39

## 2022-12-17 RX ADMIN — ONDANSETRON 4 MG: 2 INJECTION INTRAMUSCULAR; INTRAVENOUS at 17:39

## 2022-12-17 RX ADMIN — HEPARIN SODIUM 5000 UNITS: 5000 INJECTION INTRAVENOUS; SUBCUTANEOUS at 21:55

## 2022-12-17 RX ADMIN — IOHEXOL 85 ML: 350 INJECTION, SOLUTION INTRAVENOUS at 12:14

## 2022-12-17 RX ADMIN — IOHEXOL 85 ML: 350 INJECTION, SOLUTION INTRAVENOUS at 12:17

## 2022-12-17 NOTE — ED PROVIDER NOTES
History  Chief Complaint   Patient presents with   • Altered Mental Status     Pt stating "I cant breath"  unable to answer any questions  Disoriented to time/place/situation     Patient is a 24-year-old female with a significant past medical history of seizures, hypertension, hyperlipidemia, hypothyroidism, NMS, currently presenting with altered mental status  As per report, the patient or her mother called EMS to their home secondary to "not feeling well"  On EMS arrival, all the patient would say was that she "cannot breathe"  EMS has reportedly had experience with this patient in the past, and states that she normally has a GCS of 15, and this seems different than her normal mental status  Otherwise, difficult to obtain further history  She continues to state that she cannot breathe to most questions  She is unable to tell me when this started, or more about the nature of her breathing difficulties  She does tell another provider that she had some chest pain, and abdominal pain, but is unable to describe more about this  History otherwise limited  Prior to Admission Medications   Prescriptions Last Dose Informant Patient Reported? Taking? LORazepam (ATIVAN) 2 mg tablet 12/16/2022  No Yes   Sig: Take 1 tablet (2 mg total) by mouth every 6 (six) hours as needed for anxiety   PARoxetine (PAXIL) 30 mg tablet 12/16/2022  No Yes   Sig: Take 2 tablets (60mg total) by mouth daily   QUEtiapine (SEROquel XR) 400 mg 24 hr tablet 12/16/2022  No Yes   Sig: TAKE 1 TABLET (400 MG TOTAL) BY MOUTH DAILY AT BEDTIME   acetaminophen (TYLENOL) 325 mg tablet 12/16/2022  No Yes   Sig: Take 3 tablets (975 mg total) by mouth every 8 (eight) hours   Patient taking differently: Take 975 mg by mouth every 8 (eight) hours as needed   amLODIPine (NORVASC) 5 mg tablet 12/16/2022  No Yes   Sig: TAKE 1 TABLET (5 MG TOTAL) BY MOUTH DAILY     ferrous sulfate 325 (65 Fe) mg tablet 12/16/2022  No Yes   Sig: Take 1 tablet (325 mg total) by mouth 2 (two) times a day with meals   levothyroxine 25 mcg tablet 12/16/2022  No Yes   Sig: TAKE 1 TABLET BY MOUTH EVERY DAY   ondansetron (ZOFRAN) 4 mg tablet 12/16/2022  No Yes   Sig: Take 1 tablet (4 mg total) by mouth every 8 (eight) hours as needed for nausea or vomiting   pantoprazole (PROTONIX) 40 mg tablet 12/16/2022  No Yes   Sig: Take 1 tablet (40 mg total) by mouth 2 (two) times a day   prochlorperazine (COMPAZINE) 10 mg tablet 12/16/2022  No Yes   Sig: Take 1 tablet (10 mg total) by mouth every 6 (six) hours as needed for nausea or vomiting   sucralfate (CARAFATE) 1 g tablet 12/16/2022  No Yes   Sig: TAKE 1 TABLET (1 G TOTAL) BY MOUTH 3 (THREE) TIMES A DAY WITH MEALS   triamcinolone (KENALOG) 0 1 % cream Past Week  No Yes   Sig: For acute flares, apply topically twice daily for up to 2 weeks at a time and then take one week off  DO NOT use on the face, armpits, and groin area  When not flaring, can use 1-2 times weekly on areas prone to rash  Patient taking differently: if needed For acute flares, apply topically twice daily for up to 2 weeks at a time and then take one week off  DO NOT use on the face, armpits, and groin area  When not flaring, can use 1-2 times weekly on areas prone to rash     ziprasidone (GEODON) 80 mg capsule 12/16/2022  No Yes   Sig: TAKE 1 CAPSULE BY MOUTH EVERY DAY      Facility-Administered Medications: None       Past Medical History:   Diagnosis Date   • Anxiety    • Arthritis    • Back pain at L4-L5 level    • Schreiber esophagus    • Bulimia    • Colon polyp    • Disease of thyroid gland     hypothyroid   • Ear problems    • GERD (gastroesophageal reflux disease)    • History of transfusion    • Hyperlipidemia    • Hypertension    • Hypothyroidism    • Leukopenia    • Nasal congestion    • NMS (neuroleptic malignant syndrome) 01/03/2020   • Pneumonia    • Rheumatoid arthritis involving right hip (HCC)    • Sciatica    • Seizure (HCC)     due to medication mix up 8/2018 only one historically   • Seizures (HCC)    • Synovial cyst of lumbar spine    • Vertigo    • Weight gain        Past Surgical History:   Procedure Laterality Date   • BONE MARROW BIOPSY     • BREAST SURGERY      enlargement with implants   • CLOSED REDUCTION DISTAL FEMUR FRACTURE     • COLONOSCOPY     • COSMETIC SURGERY     • DENTAL SURGERY     • HIP ARTHROPLASTY Right 1/2/2020    Procedure: REVISION TOTAL HIP ARTHROPLASTY WITH REPAIR OF PARIPROSTHETIC FRACTURE - POSTERIOR APPROACH;  Surgeon: Lynette Ramos MD;  Location: [facility];  Service: Orthopedics   • GA ESOPHAGOGASTRODUODENOSCOPY TRANSORAL DIAGNOSTIC N/A 5/11/2021    Procedure: ESOPHAGOGASTRODUODENOSCOPY (EGD); Surgeon: Joshua Pacheco MD;  Location[facility];  Service: General   • GA ESOPHAGUS SURG PROCEDURE UNLISTED N/A 5/11/2021    Procedure: FUNDOPLICATION TRANSORAL INCISIONLESS;  Surgeon: Gerda Castellano MD;  Location[facility];  Service: Gastroenterology   • GA LAP, REPAIR PARAESOPHAGEAL HERNIA, INCL FUNDOPLASTY W/ MESH N/A 5/11/2021    Procedure: REPAIR HERNIA PARAESOPHAGEAL  LAPAROSCOPIC;  Surgeon: Joshua Pacheco MD;  [facility];  Service: General   • GA TOTAL HIP ARTHROPLASTY Right 12/11/2019    Procedure: ARTHROPLASTY HIP TOTAL ANTERIOR;  Surgeon: Lynette Ramos MD;  [facility];  Service: Orthopedics   • RHINOPLASTY     • SINUS SURGERY     • TRANSORAL INCISIONLESS FUNDOPLICATION (TIF)  71/76/0417       Family History   Problem Relation Age of Onset   • Uterine cancer Mother    • Esophageal cancer Father    • Colon cancer Maternal Grandmother      I have reviewed and agree with the history as documented      E-Cigarette/Vaping   • E-Cigarette Use Never User      E-Cigarette/Vaping Substances   • Nicotine No    • THC No    • CBD No    • Flavoring No    • Other No    • Unknown No      Social History     Tobacco Use   • Smoking status: Never   • Smokeless tobacco: Never   Vaping Use   • Vaping Use: Never used Substance Use Topics   • Alcohol use: Not Currently   • Drug use: Not Currently        Review of Systems   Unable to perform ROS: Mental status change       Physical Exam  ED Triage Vitals [12/17/22 1011]   Temperature Pulse Respirations Blood Pressure SpO2   98 7 °F (37 1 °C) 102 18 170/98 97 %      Temp Source Heart Rate Source Patient Position - Orthostatic VS BP Location FiO2 (%)   Oral Monitor -- -- --      Pain Score       6             Orthostatic Vital Signs  Vitals:    12/17/22 1515 12/17/22 1530 12/17/22 1707 12/17/22 1823   BP: 135/80 142/81 145/81 144/87   Pulse: 86 76 74 84       Physical Exam  Vitals and nursing note reviewed  Constitutional:       General: She is not in acute distress  Appearance: Normal appearance  She is not ill-appearing or toxic-appearing  HENT:      Head: Normocephalic and atraumatic  Right Ear: External ear normal       Left Ear: External ear normal       Nose: Nose normal    Eyes:      General: No scleral icterus  Right eye: No discharge  Left eye: No discharge  Extraocular Movements: Extraocular movements intact  Conjunctiva/sclera: Conjunctivae normal       Pupils: Pupils are equal, round, and reactive to light  Cardiovascular:      Rate and Rhythm: Regular rhythm  Tachycardia present  Heart sounds: Normal heart sounds  No murmur heard  No friction rub  No gallop  Pulmonary:      Effort: Pulmonary effort is normal  No respiratory distress  Breath sounds: Examination of the left-lower field reveals rhonchi  Rhonchi present  Abdominal:      General: Abdomen is flat  There is no distension  Palpations: Abdomen is soft  There is no mass  Tenderness: There is generalized abdominal tenderness  Genitourinary:     Comments: Deferred  Musculoskeletal:         General: No tenderness  Normal range of motion  Cervical back: Normal range of motion  Right lower leg: No edema  Left lower leg: No edema  Skin:     General: Skin is warm and dry  Comments: Several abrasions of the bilateral anterior shins   Neurological:      General: No focal deficit present  Mental Status: She is alert  Comments: AAOx1 (self), responds "I can't breathe" to most questions, has difficulty naming objects appropriately  Follows commands           ED Medications  Medications   oxyCODONE (ROXICODONE) IR tablet 2 5 mg (2 5 mg Oral Not Given 12/17/22 1615)   acetaminophen (TYLENOL) tablet 650 mg (has no administration in time range)   ondansetron (ZOFRAN) injection 4 mg (4 mg Intravenous Given 12/17/22 1739)   heparin (porcine) subcutaneous injection 5,000 Units (has no administration in time range)   sodium chloride 0 9 % infusion (75 mL/hr Intravenous New Bag 12/17/22 1740)   pantoprazole (PROTONIX) injection 40 mg (has no administration in time range)   levothyroxine tablet 25 mcg (has no administration in time range)   sucralfate (CARAFATE) tablet 1 g (has no administration in time range)   tetanus-diphtheria-acellular pertussis (BOOSTRIX) IM injection 0 5 mL (0 5 mL Intramuscular Given 12/17/22 1047)   ceftriaxone (ROCEPHIN) 1 g/50 mL in dextrose IVPB (0 mg Intravenous Stopped 12/17/22 1209)   sodium chloride 0 9 % bolus 1,000 mL (0 mL Intravenous Stopped 12/17/22 1345)   iohexol (OMNIPAQUE) 350 MG/ML injection (SINGLE-DOSE) 100 mL (85 mL Intravenous Given 12/17/22 1214)   iohexol (OMNIPAQUE) 350 MG/ML injection (SINGLE-DOSE) 85 mL (85 mL Intravenous Given 12/17/22 1217)   LORazepam (ATIVAN) injection 0 5 mg (0 5 mg Intravenous Given 12/17/22 1738)       Diagnostic Studies  Results Reviewed     Procedure Component Value Units Date/Time    TSH, 3rd generation with Free T4 reflex [[id number]]  (Normal) Collected: 12/17/22 1040    Lab Status: Final result Specimen: Blood from Arm, Right Updated: 12/17/22 1830     TSH 3RD GENERATON 1 970 uIU/mL     Narrative:      Patients undergoing fluorescein dye angiography may retain small amounts of fluorescein in the body for 48-72 hours post procedure  Samples containing fluorescein can produce falsely depressed TSH values  If the patient had this procedure,a specimen should be resubmitted post fluorescein clearance  Rapid drug screen, urine [[id number]]  (Normal) Collected: 12/17/22 1332    Lab Status: Final result Specimen: Urine, Clean Catch Updated: 12/17/22 1709     Amph/Meth UR Negative     Barbiturate Ur Negative     Benzodiazepine Urine Negative     Cocaine Urine Negative     Methadone Urine Negative     Opiate Urine Negative     PCP Ur Negative     THC Urine Negative     Oxycodone Urine Negative    Narrative:      FOR MEDICAL PURPOSES ONLY  IF CONFIRMATION NEEDED PLEASE CONTACT THE LAB WITHIN 5 DAYS  Drug Screen Cutoff Levels:  AMPHETAMINE/METHAMPHETAMINES  1000 ng/mL  BARBITURATES     200 ng/mL  BENZODIAZEPINES     200 ng/mL  COCAINE      300 ng/mL  METHADONE      300 ng/mL  OPIATES      300 ng/mL  PHENCYCLIDINE     25 ng/mL  THC       50 ng/mL  OXYCODONE      100 ng/mL    Blood culture #1 [[id number]] Collected: 12/17/22 1111    Lab Status: Preliminary result Specimen: Blood from Arm, Right Updated: 12/17/22 1501     Blood Culture Received in Microbiology Lab  Culture in Progress  Blood culture #2 [[id number]] Collected: 12/17/22 1111    Lab Status: Preliminary result Specimen: Blood from Arm, Left Updated: 12/17/22 1501     Blood Culture Received in Microbiology Lab  Culture in Progress  Fingerstick Glucose (POCT) [[id number]]  (Normal) Collected: 12/17/22 1453    Lab Status: Final result Updated: 12/17/22 1454     POC Glucose 97 mg/dl     Lactic acid 2 Hours [[id number]]  (Normal) Collected: 12/17/22 1354    Lab Status: Final result Specimen: Blood from Arm, Right Updated: 12/17/22 1425     LACTIC ACID 1 2 mmol/L     Narrative:      Result may be elevated if tourniquet was used during collection      UA w Reflex to Microscopic w Reflex to Culture [[id number]]  (Abnormal) Collected: 12/17/22 1332    Lab Status: Final result Specimen: Urine, Clean Catch Updated: 12/17/22 1355     Color, UA Light Yellow     Clarity, UA Clear     Specific Gravity, UA 1 032     pH, UA 6 0     Leukocytes, UA Negative     Nitrite, UA Negative     Protein, UA Negative mg/dl      Glucose, UA Negative mg/dl      Ketones, UA Negative mg/dl      Urobilinogen, UA <2 0 mg/dl      Bilirubin, UA Negative     Occult Blood, UA Negative    HS Troponin I 2hr [[id number]]  (Normal) Collected: 12/17/22 1246    Lab Status: Final result Specimen: Blood from Arm, Left Updated: 12/17/22 1320     hs TnI 2hr 4 ng/L      Delta 2hr hsTnI 1 ng/L     Lactic acid [[id number]]  (Abnormal) Collected: 12/17/22 1111    Lab Status: Final result Specimen: Blood from Arm, Right Updated: 12/17/22 1239     LACTIC ACID 2 7 mmol/L     Narrative:      Result may be elevated if tourniquet was used during collection      Procalcitonin [[id number]]  (Normal) Collected: 12/17/22 1040    Lab Status: Final result Specimen: Blood from Arm, Right Updated: 12/17/22 1212     Procalcitonin 0 05 ng/ml     Protime-INR [[id number]]  (Normal) Collected: 12/17/22 1040    Lab Status: Final result Specimen: Blood from Arm, Right Updated: 12/17/22 1205     Protime 13 1 seconds      INR 0 97    APTT [[id number]]  (Normal) Collected: 12/17/22 1040    Lab Status: Final result Specimen: Blood from Arm, Right Updated: 12/17/22 1205     PTT 24 seconds     CBC and differential [[id number]]  (Abnormal) Collected: 12/17/22 1040    Lab Status: Final result Specimen: Blood from Arm, Right Updated: 12/17/22 1142     WBC 13 56 Thousand/uL      RBC 5 37 Million/uL      Hemoglobin 10 7 g/dL      Hematocrit 36 3 %      MCV 68 fL      MCH 19 9 pg      MCHC 29 5 g/dL      MPV 8 5 fL      Platelets 762 Thousands/uL      nRBC 0 /100 WBCs      Neutrophils Relative 90 %      Immat GRANS % 1 %      Lymphocytes Relative 4 %      Monocytes Relative 5 %      Eosinophils Relative 0 % Basophils Relative 0 %      Neutrophils Absolute 12 18 Thousands/µL      Immature Grans Absolute 0 07 Thousand/uL      Lymphocytes Absolute 0 59 Thousands/µL      Monocytes Absolute 0 68 Thousand/µL      Eosinophils Absolute 0 01 Thousand/µL      Basophils Absolute 0 03 Thousands/µL     Narrative: This is an appended report  These results have been appended to a previously verified report  FLU/RSV/COVID - if FLU/RSV clinically relevant [[id number]]  (Normal) Collected: 12/17/22 1040    Lab Status: Final result Specimen: Nares from Nose Updated: 12/17/22 1139     SARS-CoV-2 Negative     INFLUENZA A PCR Negative     INFLUENZA B PCR Negative     RSV PCR Negative    Narrative:      FOR PEDIATRIC PATIENTS - copy/paste COVID Guidelines URL to browser: [email or web address]  [employer]    SARS-CoV-2 assay is a Nucleic Acid Amplification assay intended for the  qualitative detection of nucleic acid from SARS-CoV-2 in nasopharyngeal  swabs  Results are for the presumptive identification of SARS-CoV-2 RNA  Positive results are indicative of infection with SARS-CoV-2, the virus  causing COVID-19, but do not rule out bacterial infection or co-infection  with other viruses  Laboratories within the [place] and its  territories are required to report all positive results to the appropriate  public health authorities  Negative results do not preclude SARS-CoV-2  infection and should not be used as the sole basis for treatment or other  patient management decisions  Negative results must be combined with  clinical observations, patient history, and epidemiological information  This test has not been FDA cleared or approved  This test has been authorized by FDA under an Emergency Use Authorization  (EUA)   This test is only authorized for the duration of time the  declaration that circumstances exist justifying the authorization of the  emergency use of an in vitro diagnostic tests for detection of SARS-CoV-2  virus and/or diagnosis of COVID-19 infection under section 564(b)(1) of  the Act, 21 U  S C  510YQL-0(K)(1), unless the authorization is terminated  or revoked sooner  The test has been validated but independent review by FDA  and CLIA is pending  Test performed using [employer] Gene[employer]pert: This RT-PCR assay targets N2,  a region unique to SARS-CoV-2  A conserved region in the E-gene was chosen  for pan-Sarbecovirus detection which includes SARS-CoV-2  According to CMS-2020-01-R, this platform meets the definition of high-throughput technology  D-dimer, quantitative [[id number]]  (Abnormal) Collected: 12/17/22 1040    Lab Status: Final result Specimen: Blood from Arm, Right Updated: 12/17/22 1134     D-Dimer, Quant 3 15 ug/ml FEU     Narrative: In the evaluation for possible pulmonary embolism, in the appropriate (Well's Score of 4 or less) patient, the age adjusted d-dimer cutoff for this patient can be calculated as:    Age x 0 01 (in ug/mL) for Age-adjusted D-dimer exclusion threshold for a patient over 50 years      HS Troponin 0hr (reflex protocol) [[id number]]  (Normal) Collected: 12/17/22 1040    Lab Status: Final result Specimen: Blood from Arm, Right Updated: 12/17/22 1118     hs TnI 0hr 3 ng/L     Comprehensive metabolic panel [[id number]]  (Abnormal) Collected: 12/17/22 1040    Lab Status: Final result Specimen: Blood from Arm, Right Updated: 12/17/22 1111     Sodium 128 mmol/L      Potassium 3 6 mmol/L      Chloride 94 mmol/L      CO2 22 mmol/L      ANION GAP 12 mmol/L      BUN 15 mg/dL      Creatinine 0 85 mg/dL      Glucose 123 mg/dL      Calcium 10 1 mg/dL      AST 29 U/L      ALT 31 U/L      Alkaline Phosphatase 110 U/L      Total Protein 8 6 g/dL      Albumin 4 1 g/dL      Total Bilirubin 0 48 mg/dL      eGFR 73 ml/min/1 73sq m     Narrative:      [place] guidelines for Chronic Kidney Disease (CKD):   •  Stage 1 with normal or high GFR (GFR > 90 mL/min/1 73 square meters)  •  Stage 2 Mild CKD (GFR = 60-89 mL/min/1 73 square meters)  •  Stage 3A Moderate CKD (GFR = 45-59 mL/min/1 73 square meters)  •  Stage 3B Moderate CKD (GFR = 30-44 mL/min/1 73 square meters)  •  Stage 4 Severe CKD (GFR = 15-29 mL/min/1 73 square meters)  •  Stage 5 End Stage CKD (GFR <15 mL/min/1 73 square meters)  Note: GFR calculation is accurate only with a steady state creatinine    Lipase [[id number]]  (Normal) Collected: 12/17/22 1040    Lab Status: Final result Specimen: Blood from Arm, Right Updated: 12/17/22 1111     Lipase 97 u/L     Fingerstick Glucose (POCT) [[id number]]  (Normal) Collected: 12/17/22 1049    Lab Status: Final result Updated: 12/17/22 1103     POC Glucose 110 mg/dl     Blood gas, venous [[id number]]  (Abnormal) Collected: 12/17/22 1040    Lab Status: Final result Specimen: Blood from Arm, Right Updated: 12/17/22 1050     pH, Andrew 7 450     pCO2, Andrew 30 2 mm Hg      pO2, Andrew 83 6 mm Hg      HCO3, Andrew 20 5 mmol/L      Base Excess, Andrew -2 5 mmol/L      O2 Content, Andrew 16 2 ml/dL      O2 HGB, VENOUS 94 8 %                  CTA head and neck with and without contrast   Final Result by Claire Rothman DO (12/17 1301)      Normal CTA of the neck and brain  Left inferior mastoid air cell opacification  Workstation performed: [id number]         CTA chest ct abdomen pelvis w contrast   Final Result by Nancy Carballo MD (12/17 1302)      No pulmonary embolus  Measured RV/LV ratio is within normal limits at less than 0 9  Distal esophageal circumferential wall thickening suggesting esophagitis  Mild bladder wall thickening  Correlate with urinalysis  Workstation performed: [id number]         XR chest 1 view portable   Final Result by Chris Segundo MD (12/17 8345)      No acute cardiopulmonary disease  Distended transverse colon    Recommend abdominal series if clinically warranted  The study was marked in [facility] for immediate notification  Workstation performed: [id number]         CT head without contrast   Final Result by Nellie Jarrell MD (12/17 1115)      No acute intracranial abnormality  In particular, no evidence of acute territorial infarct or intracranial hemorrhage  Workstation performed: [id number]               Procedures  ECG 12 Lead Documentation Only    Date/Time: 12/17/2022 11:30 AM  Performed by: Daisy Mary DO  Authorized by: Daisy Mary DO     Indications / Diagnosis:  Altered mental status  ECG reviewed by me, the ED Provider: yes    Patient location:  ED  Previous ECG:     Previous ECG:  Compared to current    Similarity:  No change  Interpretation:     Interpretation: normal    Rate:     ECG rate:  61    ECG rate assessment: normal    Rhythm:     Rhythm: sinus rhythm    Ectopy:     Ectopy: none    QRS:     QRS axis:  Normal  Conduction:     Conduction: normal    ST segments:     ST segments:  Normal  T waves:     T waves: normal            ED Course  ED Course as of 12/17/22 2115   Sat Dec 17, 2022   1255 LACTIC ACID(!!): 2 7                          Initial Sepsis Screening     Row Name 12/17/22 1257                Is the patient's history suggestive of a new or worsening infection? Yes (Proceed)  -GJ        Suspected source of infection suspect infection, source unknown  -GJ        Are two or more of the following signs & symptoms of infection both present and new to the patient?  Yes (Proceed)  -GJ        Indicate SIRS criteria Tachycardia > 90 bpm;Leukocytosis (WBC > 78735 IJL)  -GJ        If the answer is yes to both questions, suspicion of sepsis is present --        If severe sepsis is present AND tissue hypoperfusion perists in the hour after fluid resuscitation or lactate > 4, the patient meets criteria for SEPTIC SHOCK --        Are any of the following organ dysfunction criteria present within 6 hours of suspected infection and SIRS criteria that are NOT considered to be chronic conditions? Yes  -GJ        Organ dysfunction Lactate > 2 0 mmol/L  -GJ        Date of presentation of severe sepsis 12/17/22  -GJ        Time of presentation of severe sepsis 1300  -GJ        Tissue hypoperfusion persists in the hour after crystalloid fluid administration, evidenced, by either: --        Was hypotension present within one hour of the conclusion of crystalloid fluid administration? No  -GJ        Date of presentation of septic shock --        Time of presentation of septic shock --              User Key  (r) = Recorded By, (t) = Taken By, (c) = Cosigned By    [id number] Name Provider Type    [id number] [facility] Resident              Default Flowsheet Data (last 720 hours)     Sepsis Reassess     Row Name 12/17/22 1400                   Repeat Volume Status and Tissue Perfusion Assessment Performed    Repeat Volume Status and Tissue Perfusion Assessment Performed Yes  -GJ           Volume Status and Tissue Perfusion Post Fluid Resuscitation * Must Document All *    Vital Signs Reviewed (HR, RR, BP, T) Yes  -GJ        Shock Index Reviewed Yes  -GJ        Arterial Oxygen Saturation Reviewed (POx, SaO2 or SpO2) --        Cardio Normal S1/S2; Regular rate and rhythm; No murmor; No rub or gallop  -GJ        Pulmonary Normal effort;Clear to auscultation  -GJ        Capillary Refill Brisk  -GJ        Peripheral Pulses --        Skin Warm;Dry  -GJ        Urine output assessed --           *OR*   Intensive Monitoring- Must Document One of the Following Four *:    Vital Signs Reviewed --        * Central Venous Pressure (CVP or RAP) --        * Central Venous Oxygen (SVO2, ScvO2 or Oxygen saturation via central catheter) --        * Bedside Cardiovascular US in IVC diameter and % collapse --        * Passive Leg Raise OR Crystalloid Challenge --              User Key  (r) = Recorded By, (t) = Taken By, (c) = Cosigned By [id number]  Name Provider Type    [id number] [facility] Resident              SBIRT 22yo+    Flowsheet Row Most Recent Value   SBIRT (25 yo +)    In order to provide better care to our patients, we are screening all of our patients for alcohol and drug use  Would it be okay to ask you these screening questions? Unable to answer at this time Filed at: 12/17/2022 1103                MDM  Number of Diagnoses or Management Options  Acute encephalopathy: new and requires workup  Hyponatremia: new and requires workup  Lactic acidosis: new and requires workup  Diagnosis management comments: Patient is a 58year old female presenting with altered mental status  The differential includes toxic metabolic etiologies such as electrolyte disturbances (Na/Ca), hypoglycemia, and uremia; acidosis states, infection, toxidromes of intoxication or withdrawal, hypoxemia or hypercarbia, seizure, intracranial bleeds  Given this wide differential diagnosis, will send basic labs including electrolytes to evaluate for metabolic causes, troponin, ddimer, ECG, CXR, CT head, VBG    Dimer elevated  Will add CT for PE including a/p given abdominal pain  Will also perform CTA head  Lactic 2 7  Given this finding, patient meeting severe sepsis criteria (see sepsis note)  Ceftriaxone and fluids ordered  Source unclear  Labs otherwise largely unremarkable other than hyponatremia which appears to be chronic  CT imaging without any acute findings  Cause of encephalopathy undifferentiated, although improving on reassessment  Will plan to admit for further evaluation and management  Patient admitted         Amount and/or Complexity of Data Reviewed  Clinical lab tests: ordered and reviewed  Tests in the radiology section of CPT®: ordered and reviewed  Obtain history from someone other than the patient: yes  Review and summarize past medical records: yes  Discuss the patient with other providers: yes  Independent visualization of images, tracings, or specimens: yes    Patient Progress  Patient progress: improved      Disposition  Final diagnoses:   Acute encephalopathy   Lactic acidosis   Hyponatremia     Time reflects when diagnosis was documented in both MDM as applicable and the Disposition within this note     Time User Action Codes Description Comment    12/17/2022  1:21 PM Golden Pope Add [G93 40] Acute encephalopathy     12/17/2022  1:57 PM Golden Toshia Add [E87 20] Lactic acidosis     12/17/2022  1:57 PM Nellie Mcdaniel [E87 1] Hyponatremia     12/17/2022  6:19 PM [place] Add [F99] Psychiatric disorder     12/17/2022  6:19 PM [place] Add [F25 1] Schizoaffective disorder, depressive type [facility])       ED Disposition     ED Disposition   Admit    Condition   Stable    Date/Time   Sat Dec 17, 2022  2:21 PM    Comment   Case was discussed with CHERYL and the patient's admission status was agreed to be Admission Status: observation status to the service of Dr Destini Rodriguez  Follow-up Information    None         Current Discharge Medication List      CONTINUE these medications which have NOT CHANGED    Details   acetaminophen (TYLENOL) 325 mg tablet Take 3 tablets (975 mg total) by mouth every 8 (eight) hours  Qty: 30 tablet, Refills: 0    Associated Diagnoses: Hiatal hernia with GERD and esophagitis      amLODIPine (NORVASC) 5 mg tablet TAKE 1 TABLET (5 MG TOTAL) BY MOUTH DAILY    Qty: 90 tablet, Refills: 1    Associated Diagnoses: Hypertension      ferrous sulfate 325 (65 Fe) mg tablet Take 1 tablet (325 mg total) by mouth 2 (two) times a day with meals  Qty: 60 tablet, Refills: 0    Associated Diagnoses: Anemia, unspecified type      levothyroxine 25 mcg tablet TAKE 1 TABLET BY MOUTH EVERY DAY  Qty: 90 tablet, Refills: 3    Associated Diagnoses: Acquired hypothyroidism      LORazepam (ATIVAN) 2 mg tablet Take 1 tablet (2 mg total) by mouth every 6 (six) hours as needed for anxiety  Qty: 120 tablet, Refills: 1 Comments: Not to exceed 4 additional fills before 08/13/2022  Associated Diagnoses: Anxiety      ondansetron (ZOFRAN) 4 mg tablet Take 1 tablet (4 mg total) by mouth every 8 (eight) hours as needed for nausea or vomiting  Qty: 20 tablet, Refills: 5    Associated Diagnoses: Nausea      pantoprazole (PROTONIX) 40 mg tablet Take 1 tablet (40 mg total) by mouth 2 (two) times a day  Qty: 60 tablet, Refills: 5    Associated Diagnoses: Erosive esophagitis      PARoxetine (PAXIL) 30 mg tablet Take 2 tablets (60mg total) by mouth daily  Qty: 180 tablet, Refills: 3    Associated Diagnoses: Anxiety      prochlorperazine (COMPAZINE) 10 mg tablet Take 1 tablet (10 mg total) by mouth every 6 (six) hours as needed for nausea or vomiting  Qty: 30 tablet, Refills: 0    Associated Diagnoses: Bilious vomiting with nausea      QUEtiapine (SEROquel XR) 400 mg 24 hr tablet TAKE 1 TABLET (400 MG TOTAL) BY MOUTH DAILY AT BEDTIME  Qty: 90 tablet, Refills: 0    Associated Diagnoses: Anxiety      sucralfate (CARAFATE) 1 g tablet TAKE 1 TABLET (1 G TOTAL) BY MOUTH 3 (THREE) TIMES A DAY WITH MEALS  Qty: 90 tablet, Refills: 3    Associated Diagnoses: Erosive esophagitis      triamcinolone (KENALOG) 0 1 % cream For acute flares, apply topically twice daily for up to 2 weeks at a time and then take one week off  DO NOT use on the face, armpits, and groin area  When not flaring, can use 1-2 times weekly on areas prone to rash  Qty: 80 g, Refills: 3    Associated Diagnoses: Rash      ziprasidone (GEODON) 80 mg capsule TAKE 1 CAPSULE BY MOUTH EVERY DAY  Qty: 90 capsule, Refills: 3    Associated Diagnoses: ABIMAEL (generalized anxiety disorder)           No discharge procedures on file  PDMP Review       Value Time User    PDMP Reviewed  Yes 10/3/2022  9:37 AM Adri Horn MD           ED Provider  Attending physically available and evaluated Romel Kamaljit OCAMPO managed the patient along with the ED Attending      Electronically Signed by [address] Ty[address] Vigil, DO  12/17/22 2118

## 2022-12-17 NOTE — ASSESSMENT & PLAN NOTE
Met SIRs criteria with leukocytosis, tachycardia  Unclear source  S/p CT imaging no source  UA benign  No meningeal sign on exam  Low procal  Given IV ctx in the ED   Hold on abx unless should detoriate then will place on broad spectrum abx  F/u blood cx  Resolved lactic acidosis

## 2022-12-17 NOTE — SEPSIS NOTE
Sepsis Note   Dixie Cortes 58 y o  female MRN: [id number]  Unit/Bed#: ED 20 Encounter: [id number]       qSOFA     Row Name 12/17/22 1230 12/17/22 1200 12/17/22 1030 12/17/22 1011       Altered mental status GCS < 15 -- -- 1 --     Respiratory Rate > / =22 0 0 -- 0     Systolic BP < / =577 0 0 -- 0     Q Sofa Score 1 0 1 0                Initial Sepsis Screening     Row Name 12/17/22 1257                Is the patient's history suggestive of a new or worsening infection? Yes (Proceed)  -GJ        Suspected source of infection suspect infection, source unknown  -GJ        Are two or more of the following signs & symptoms of infection both present and new to the patient? Yes (Proceed)  -GJ        Indicate SIRS criteria Tachycardia > 90 bpm;Leukocytosis (WBC > 18973 IJL)  -GJ        If the answer is yes to both questions, suspicion of sepsis is present --        If severe sepsis is present AND tissue hypoperfusion perists in the hour after fluid resuscitation or lactate > 4, the patient meets criteria for SEPTIC SHOCK --        Are any of the following organ dysfunction criteria present within 6 hours of suspected infection and SIRS criteria that are NOT considered to be chronic conditions? Yes  -GJ        Organ dysfunction Lactate > 2 0 mmol/L  -GJ        Date of presentation of severe sepsis 12/17/22  -GJ        Time of presentation of severe sepsis 1300  -GJ        Tissue hypoperfusion persists in the hour after crystalloid fluid administration, evidenced, by either: --        Was hypotension present within one hour of the conclusion of crystalloid fluid administration?  No  -GJ        Date of presentation of septic shock --        Time of presentation of septic shock --              User Key  (r) = Recorded By, (t) = Taken By, (c) = Cosigned By    [id number] Name Provider Type    [id number] [facility] Resident

## 2022-12-17 NOTE — ASSESSMENT & PLAN NOTE
Hx of schizophrenia  Will place psych consult  Hold psych meds for now as they also need to be verified

## 2022-12-17 NOTE — ED ATTENDING ATTESTATION
12/17/2022  IGuerda MD, saw and evaluated the patient  I have discussed the patient with the resident/non-physician practitioner and agree with the resident's/non-physician practitioner's findings, Plan of Care, and MDM as documented in the resident's/non-physician practitioner's note, except where noted  All available labs and Radiology studies were reviewed  I was present for key portions of any procedure(s) performed by the resident/non-physician practitioner and I was immediately available to provide assistance  At this point I agree with the current assessment done in the Emergency Department  I have conducted an independent evaluation of this patient a history and physical is as follows:    ED Course      57-year-old female presenting to the emergency department for evaluation of altered mental status  Patient is a limited historian, and is presenting via EMS for evaluation of altered mental status  Patient's primary complaint is that she is short of breath  Patient states that it was present on awakening this morning  She reports cough  She states cough is nonproductive  Patient had some right-sided chest pain present on waking this morning  Patient also intermittently states that she has abdominal pain  No reported vomiting  No diarrhea  No reported fever  Patient unable to provide appropriate review of systems secondary to altered mental status      Past Medical History:   Diagnosis Date   • Anxiety    • Arthritis    • Back pain at L4-L5 level    • Schreiber esophagus    • Bulimia    • Colon polyp    • Disease of thyroid gland     hypothyroid   • Ear problems    • GERD (gastroesophageal reflux disease)    • History of transfusion    • Hyperlipidemia    • Hypertension    • Hypothyroidism    • Leukopenia    • Nasal congestion    • NMS (neuroleptic malignant syndrome) 01/03/2020   • Pneumonia    • Rheumatoid arthritis involving right hip (HCC)    • Sciatica    • Seizure ([facility] [address] ) due to medication mix up 8/2018 only one historically   • Seizures (HCC)    • Synovial cyst of lumbar spine    • Vertigo    • Weight gain      On examination patient is sitting upright in the stretcher in no acute respiratory distress  Vital signs were reviewed  Head is normocephalic and atraumatic  Eyelids and lashes are normal   Mucous membranes are dry  Neck is nontender and supple with full range of motion  Lungs are diminished bilateral bases  Heart is tachycardic and regular  Abdomen is nondistended, soft and nontender  Extremities unremarkable  Patient is awake and alert and oriented to person  Patient seems to have an intermittent expressive aphasia  Motor is 5 out of 5 bilateral upper and lower extremities  Sensation intact  Labs Reviewed   CBC AND DIFFERENTIAL - Abnormal       Result Value Ref Range Status    WBC 13 56 (*) 4 31 - 10 16 Thousand/uL Final    RBC 5 37 (*) 3 81 - 5 12 Million/uL Final    Hemoglobin 10 7 (*) 11 5 - 15 4 g/dL Final    Hematocrit 36 3  34 8 - 46 1 % Final    MCV 68 (*) 82 - 98 fL Final    MCH 19 9 (*) 26 8 - 34 3 pg Final    MCHC 29 5 (*) 31 4 - 37 4 g/dL Final    MPV 8 5 (*) 8 9 - 12 7 fL Final    Platelets 307 (*) 943 - 390 Thousands/uL Final    nRBC 0  /100 WBCs Final    Comment: This is an appended report  These results have been appended to a previously preliminary verified report      Neutrophils Relative 90 (*) 43 - 75 % Final    Immat GRANS % 1  0 - 2 % Final    Lymphocytes Relative 4 (*) 14 - 44 % Final    Monocytes Relative 5  4 - 12 % Final    Eosinophils Relative 0  0 - 6 % Final    Basophils Relative 0  0 - 1 % Final    Neutrophils Absolute 12 18 (*) 1 85 - 7 62 Thousands/µL Final    Immature Grans Absolute 0 07  0 00 - 0 20 Thousand/uL Final    Lymphocytes Absolute 0 59 (*) 0 60 - 4 47 Thousands/µL Final    Monocytes Absolute 0 68  0 17 - 1 22 Thousand/µL Final    Eosinophils Absolute 0 01  0 00 - 0 61 Thousand/µL Final    Basophils Absolute 0 03 0 00 - 0 10 Thousands/µL Final    Narrative: This is an appended report  These results have been appended to a previously verified report  COMPREHENSIVE METABOLIC PANEL - Abnormal    Sodium 128 (*) 135 - 147 mmol/L Final    Potassium 3 6  3 5 - 5 3 mmol/L Final    Chloride 94 (*) 96 - 108 mmol/L Final    CO2 22  21 - 32 mmol/L Final    ANION GAP 12  4 - 13 mmol/L Final    BUN 15  5 - 25 mg/dL Final    Creatinine 0 85  0 60 - 1 30 mg/dL Final    Comment: Standardized to IDMS reference method    Glucose 123  65 - 140 mg/dL Final    Comment: If the patient is fasting, the ADA then defines impaired fasting glucose as > 100 mg/dL and diabetes as > or equal to 123 mg/dL  Specimen collection should occur prior to Sulfasalazine administration due to the potential for falsely depressed results  Specimen collection should occur prior to Sulfapyridine administration due to the potential for falsely elevated results  Calcium 10 1  8 3 - 10 1 mg/dL Final    AST 29  5 - 45 U/L Final    Comment: Specimen collection should occur prior to Sulfasalazine administration due to the potential for falsely depressed results  ALT 31  12 - 78 U/L Final    Comment: Specimen collection should occur prior to Sulfasalazine and/or Sulfapyridine administration due to the potential for falsely depressed results  Alkaline Phosphatase 110  46 - 116 U/L Final    Total Protein 8 6 (*) 6 4 - 8 4 g/dL Final    Albumin 4 1  3 5 - 5 0 g/dL Final    Total Bilirubin 0 48  0 20 - 1 00 mg/dL Final    Comment: Use of this assay is not recommended for patients undergoing treatment with eltrombopag due to the potential for falsely elevated results      eGFR 73  ml/min/1 73sq m Final    Narrative:     [place] guidelines for Chronic Kidney Disease (CKD):   •  Stage 1 with normal or high GFR (GFR > 90 mL/min/1 73 square meters)  •  Stage 2 Mild CKD (GFR = 60-89 mL/min/1 73 square meters)  •  Stage 3A Moderate CKD (GFR = 45-59 mL/min/1 73 square meters)  •  Stage 3B Moderate CKD (GFR = 30-44 mL/min/1 73 square meters)  •  Stage 4 Severe CKD (GFR = 15-29 mL/min/1 73 square meters)  •  Stage 5 End Stage CKD (GFR <15 mL/min/1 73 square meters)  Note: GFR calculation is accurate only with a steady state creatinine   UA W REFLEX TO MICROSCOPIC WITH REFLEX TO CULTURE - Abnormal    Color, UA Light Yellow   Final    Clarity, UA Clear   Final    Specific [place], UA 1 032 (*) 1 003 - 1 030 Final    pH, UA 6 0  4 5, 5 0, 5 5, 6 0, 6 5, 7 0, 7 5, 8 0 Final    Leukocytes, UA Negative  Negative Final    Nitrite, UA Negative  Negative Final    Protein, UA Negative  Negative mg/dl Final    Glucose, UA Negative  Negative mg/dl Final    Ketones, UA Negative  Negative mg/dl Final    Urobilinogen, UA <2 0  <2 0 mg/dl mg/dl Final    Bilirubin, UA Negative  Negative Final    Occult Blood, UA Negative  Negative Final   BLOOD GAS, VENOUS - Abnormal    pH, Andrew 7 450 (*) 7 300 - 7 400 Final    pCO2, Andrew 30 2 (*) 42 0 - 50 0 mm Hg Final    pO2, Andrew 83 6 (*) 35 0 - 45 0 mm Hg Final    HCO3, Andrew 20 5 (*) 24 - 30 mmol/L Final    Base Excess, Andrew -2 5  mmol/L Final    O2 Content, Andrew 16 2  ml/dL Final    O2 HGB, VENOUS 94 8 (*) 60 0 - 80 0 % Final   D-DIMER, QUANTITATIVE - Abnormal    D-Dimer, Quant 3 15 (*) <0 50 ug/ml FEU Final    Comment: Reference and upper limits to exclude DVT and PE are the same  Do not use to exclude if clinical symptoms are present  Pregnant women:  1st trimester:  <0 22 - 1 06 ug/ml FEU  2nd trimester:  <0 22 - 1 88 ug/ml FEU  3rd trimester:   0 24 - 3 28 ug/ml FEU    Note: Normal ranges may not apply to patients who are transgender, non-binary, or whose legal sex, sex at birth, and gender identity differ  Narrative:      In the evaluation for possible pulmonary embolism, in the appropriate (Well's Score of 4 or less) patient, the age adjusted d-dimer cutoff for this patient can be calculated as:    Age x 0 01 (in ug/mL) for Age-adjusted D-dimer exclusion threshold for a patient over 50 years  LACTIC ACID, PLASMA - Abnormal    LACTIC ACID 2 7 (*) 0 5 - 2 0 mmol/L Final    Narrative:     Result may be elevated if tourniquet was used during collection  COVID19, INFLUENZA A/B, RSV PCR, [facility] - Normal    SARS-CoV-2 Negative  Negative Final    Comment:      INFLUENZA A PCR Negative  Negative Final    Comment:      INFLUENZA B PCR Negative  Negative Final    Comment:      RSV PCR Negative  Negative Final    Comment:      Narrative:     FOR PEDIATRIC PATIENTS - copy/paste COVID Guidelines URL to browser: [email or web address]    SARS-CoV-2 assay is a Nucleic Acid Amplification assay intended for the  qualitative detection of nucleic acid from SARS-CoV-2 in nasopharyngeal  swabs  Results are for the presumptive identification of SARS-CoV-2 RNA  Positive results are indicative of infection with SARS-CoV-2, the virus  causing COVID-19, but do not rule out bacterial infection or co-infection  with other viruses  Laboratories within the [place] and its  territories are required to report all positive results to the appropriate  public health authorities  Negative results do not preclude SARS-CoV-2  infection and should not be used as the sole basis for treatment or other  patient management decisions  Negative results must be combined with  clinical observations, patient history, and epidemiological information  This test has not been FDA cleared or approved  This test has been authorized by FDA under an Emergency Use Authorization  (EUA)  This test is only authorized for the duration of time the  declaration that circumstances exist justifying the authorization of the  emergency use of an in vitro diagnostic tests for detection of SARS-CoV-2  virus and/or diagnosis of COVID-19 infection under section 564(b)(1) of  the Act, 21 U  S C  900RUU-3(E)(7), unless the authorization is terminated  or revoked sooner  The test has been validated but independent review by FDA  and CLIA is pending  Test performed using [employer] Gene[employer]: This RT-PCR assay targets N2,  a region unique to SARS-CoV-2  A conserved region in the E-gene was chosen  for pan-Sarbecovirus detection which includes SARS-CoV-2  According to CMS-2020-01-R, this platform meets the definition of high-throughput technology  LIPASE - Normal    Lipase 97  73 - 393 u/L Final   HS TROPONIN I 0HR - Normal    hs TnI 0hr 3  "Refer to ACS Flowchart"- see link ng/L Final    Comment:                                              Initial (time 0) result  If >=50 ng/L, Myocardial injury suggested ;  Type of myocardial injury and treatment strategy  to be determined  If 5-49 ng/L, a delta result at 2 hours and or 4 hours will be needed to further evaluate  If <4 ng/L, and chest pain has been >3 hours since onset, patient may qualify for discharge based on the HEART score in the ED  If <5 ng/L and <3hours since onset of chest pain, a delta result at 2 hours will be needed to further evaluate  HS Troponin 99th Percentile URL of a Health Population=12 ng/L with a 95% Confidence Interval of 8-18 ng/L  Second Troponin (time 2 hours)  If calculated delta >= 20 ng/L,  Myocardial injury suggested ; Type of myocardial injury and treatment strategy to be determined  If 5-49 ng/L and the calculated delta is 5-19 ng/L, consult medical service for evaluation  Continue evaluation for ischemia on ecg and other possible etiology and repeat hs troponin at 4 hours  If delta is <5 ng/L at 2 hours, consider discharge based on risk stratification via the HEART score (if in ED), or JERRY risk score in IP/Observation  HS Troponin 99th Percentile URL of a Health Population=12 ng/L with a 95% Confidence Interval of 8-18 ng/L     PROCALCITONIN TEST - Normal    Procalcitonin 0 05  <=0 25 ng/ml Final    Comment: Suspected Lower Respiratory Tract Infection (LRTI):  - LESS than or EQUAL to 0 25 ng/mL:   low likelihood for bacterial LRTI; antibiotics DISCOURAGED   - GREATER than 0 25 ng/mL:   increased likelihood for bacterial LRTI; antibiotics ENCOURAGED  Suspected Sepsis:  - Strongly consider initiating antibiotics in ALL UNSTABLE patients  - LESS than or EQUAL to 0 5 ng/mL:   low likelihood for bacterial sepsis; antibiotics DISCOURAGED   - GREATER than 0 5 ng/mL:   increased likelihood for bacterial sepsis; antibiotics ENCOURAGED   - GREATER than 2 ng/mL:   high risk for severe sepsis / septic shock; antibiotics strongly ENCOURAGED  Decisions on antibiotic use should not be based solely on Procalcitonin (PCT) levels  If PCT is low but uncertainty exists with stopping antibiotics, repeat PCT in 6-24 hours to confirm the low level  If antibiotics are administered (regardless if initial PCT was high or low), repeat PCT every 1-2 days to consider early antibiotic cessation (when GREATER than 80% decrease from the peak OR when PCT drops below designated cutoffs, whichever comes first), so long as the infection is NOT one that typically requires prolonged treatment durations (e g , bone/joint infections, endocarditis, Staph  aureus bacteremia)      Situations of FALSE-POSITIVE Procalcitonin values:  1) Newborns < 67 hours old  2) Massive stress from severe trauma / burns, major surgery, acute pancreatitis, cardiogenic / hemorrhagic shock, sickle cell crisis, or other organ perfusion abnormalities  3) Malaria and some Candidal infections  4) Treatment with agents that stimulate cytokines (e g , OKT3, anti-lymphocyte globulins, alemtuzumab, IL-2, granulocyte transfusion [NOT GCSFs])  5) Chronic renal disease causes elevated baseline levels (consider GREATER than 0 75 ng/mL as an abnormal cut-off); initiating HD/CRRT may cause transient decreases  6) Paraneoplastic syndromes from medullary thyroid or SCLC, some forms of vasculitis, and acute rubof-so-vinf disease    Situations of FALSE-NEGATIVE Procalcitonin values:  1) Too early in clinical course for PCT to have reached its peak (may repeat in 6-24 hours to confirm low level)  2) Localized infection WITHOUT systemic (SIRS / sepsis) response (e g , an abscess, osteomyelitis, cystitis)  3) Mycobacteria (e g , Tuberculosis, MAC)  4) Cystic fibrosis exacerbations     PROTIME-INR - Normal    Protime 13 1  11 6 - 14 5 seconds Final    INR 0 97  0 84 - 1 19 Final   APTT - Normal    PTT 24  23 - 37 seconds Final    Comment: Therapeutic Heparin Range =  60-90 seconds   HS TROPONIN I 2HR - Normal    hs TnI 2hr 4  "Refer to ACS Flowchart"- see link ng/L Final    Comment:                                              Initial (time 0) result  If >=50 ng/L, Myocardial injury suggested ;  Type of myocardial injury and treatment strategy  to be determined  If 5-49 ng/L, a delta result at 2 hours and or 4 hours will be needed to further evaluate  If <4 ng/L, and chest pain has been >3 hours since onset, patient may qualify for discharge based on the HEART score in the ED  If <5 ng/L and <3hours since onset of chest pain, a delta result at 2 hours will be needed to further evaluate  HS Troponin 99th Percentile URL of a Health Population=12 ng/L with a 95% Confidence Interval of 8-18 ng/L  Second Troponin (time 2 hours)  If calculated delta >= 20 ng/L,  Myocardial injury suggested ; Type of myocardial injury and treatment strategy to be determined  If 5-49 ng/L and the calculated delta is 5-19 ng/L, consult medical service for evaluation  Continue evaluation for ischemia on ecg and other possible etiology and repeat hs troponin at 4 hours  If delta is <5 ng/L at 2 hours, consider discharge based on risk stratification via the HEART score (if in ED), or JERRY risk score in IP/Observation  HS Troponin 99th Percentile URL of a Health Population=12 ng/L with a 95% Confidence Interval of 8-18 ng/L      Delta 2hr hsTnI 1  <20 ng/L Final   LACTIC ACID 2 HOUR - Normal    LACTIC ACID 1 2  0 5 - 2 0 mmol/L Final    Narrative:     Result may be elevated if tourniquet was used during collection  POCT GLUCOSE - Normal    POC Glucose 110  65 - 140 mg/dl Final   BLOOD CULTURE   BLOOD CULTURE   RAPID DRUG SCREEN, URINE       CTA head and neck with and without contrast   Final Result      Normal CTA of the neck and brain  Left inferior mastoid air cell opacification  Workstation performed: [id number]         CTA chest ct abdomen pelvis w contrast   Final Result      No pulmonary embolus  Measured RV/LV ratio is within normal limits at less than 0 9  Distal esophageal circumferential wall thickening suggesting esophagitis  Mild bladder wall thickening  Correlate with urinalysis  Workstation performed: [id number]         XR chest 1 view portable   Final Result      No acute cardiopulmonary disease  Distended transverse colon  Recommend abdominal series if clinically warranted  The study was marked in [facility] for immediate notification  Workstation performed: [id number]         CT head without contrast   Final Result      No acute intracranial abnormality  In particular, no evidence of acute territorial infarct or intracranial hemorrhage                     Workstation performed: [id number]                   Critical Care Time  Procedures

## 2022-12-17 NOTE — ASSESSMENT & PLAN NOTE
Hard discern HPI given AMS  S/p CT a/p no acute pathology  Pt w/ hx of rosas's esophagus, hiatal hernia s/p fundoplication  Recent EGD in 12/12   Follows with gastroenterology  NPO secondary to AMS, awaiting speech eval  If should persist s/p improved MS, consider GI consult  Will place on IV PPI for now

## 2022-12-17 NOTE — ASSESSMENT & PLAN NOTE
Unclear etiology  Likely metabolic encephalopathy  S/p CTH, CTA head/neck, no acute intracranial source  No identified source of infection   + hyponatremia but chronic and non significantly low  Will UDS  Check TSH  Check EEG  Will consult neurology, psychiatry

## 2022-12-17 NOTE — H&P
[address] [facility]  H&P- Elmon Patel 1960, 58 y o  female MRN: [id number]  Unit/Bed#: -01 Encounter: [id number]  Primary Care Provider: Sonja Cary MD   Date and time admitted to hospital: 12/17/2022 10:04 AM    * SIRS (systemic inflammatory response syndrome) (HCC)  Assessment & Plan  Met SIRs criteria with leukocytosis, tachycardia  Unclear source  S/p CT imaging no source  UA benign  No meningeal sign on exam  Low procal  Given IV ctx in the ED  Hold on abx unless should detoriate then will place on broad spectrum abx  F/u blood cx  Resolved lactic acidosis    Acute encephalopathy  Assessment & Plan  Unclear etiology  Likely metabolic encephalopathy  S/p CTH, CTA head/neck, no acute intracranial source  No identified source of infection  + hyponatremia but chronic and non significantly low  Will UDS  Check TSH  Check EEG  Will consult neurology, psychiatry    Abdominal pain  Assessment & Plan  Hard discern HPI given AMS  S/p CT a/p no acute pathology  Pt w/ hx of rosas's esophagus, hiatal hernia s/p fundoplication  Recent EGD in 12/12  Follows with gastroenterology  NPO secondary to AMS, awaiting speech eval  If should persist s/p improved MS, consider GI consult  Will place on IV PPI for now    Thrombocytosis  Assessment & Plan  Unclear etiology, ?  Underlying infxn process though unidentified  Cont to trend    Hyponatremia  Assessment & Plan  Chronic, possibly secondary to SIADH given on psych meds  Cont to monitor  Gentle fluid hydration while npo  Serial bmp    Essential hypertension  Assessment & Plan  Stable    Psychiatric disorder  Assessment & Plan  Hx of schizophrenia  Will place psych consult  Hold psych meds for now as they also need to be verified      VTE Prophylaxis: Heparin  / sequential compression device   Code Status: Full code  POLST: POLST is not applicable to this patient  Discussion with family: Discussed with her mom    Anticipated Length of Stay:  Patient will be admitted on an Inpatient basis with an anticipated length of stay of  > 2 midnights  Justification for Hospital Stay: acute encephalopathy    Total Time for Visit, including Counseling / Coordination of Care: 60 minutes  Greater than 50% of this total time spent on direct patient counseling and coordination of care  Chief Complaint:   Change in MS    History of Present Illness: Of note hx of from patient is extremely limited due to AMS  Her only NOK with whom she resides with is her mother who is 81 y/o thus hx of her is also limited as she is unable to provide pertinent hx or details  Kiera Small is a 58 y o  female  Medical hx significant for schizophrenia, anxiety, HTN, rosas's esophagus, hypothyroidism  Not entirely clear who called EMS to pt's home stating that patient was not feeling well  Per her mom she states patient was completely incoherent  On presentation pt reporting that she is unable to breathe then reporting of cp, abd pain, nausea  Pt's mom does not recall of any GI issues  She is also unable to tell whether pt has been compliant with her psych meds  On my evaluation of patient she reported yes to every ROS even ones that did not make sense  Otherwise she is unable to provide any hx  Review of Systems:    Review of Systems   Reason unable to perform ROS: Very limited secondary to change in MS          Past Medical and Surgical History:     Past Medical History:   Diagnosis Date   • Anxiety    • Arthritis    • Back pain at L4-L5 level    • Rosas esophagus    • Bulimia    • Colon polyp    • Disease of thyroid gland     hypothyroid   • Ear problems    • GERD (gastroesophageal reflux disease)    • History of transfusion    • Hyperlipidemia    • Hypertension    • Hypothyroidism    • Leukopenia    • Nasal congestion    • NMS (neuroleptic malignant syndrome) 01/03/2020   • Pneumonia    • Rheumatoid arthritis involving right hip (HCC)    • Sciatica    • Seizure ([facility] [address] )     due to medication mix up 8/2018 only one historically   • Seizures (HCC)    • Synovial cyst of lumbar spine    • Vertigo    • Weight gain        Past Surgical History:   Procedure Laterality Date   • BONE MARROW BIOPSY     • BREAST SURGERY      enlargement with implants   • CLOSED REDUCTION DISTAL FEMUR FRACTURE     • COLONOSCOPY     • COSMETIC SURGERY     • DENTAL SURGERY     • HIP ARTHROPLASTY Right 1/2/2020    Procedure: REVISION TOTAL HIP ARTHROPLASTY WITH REPAIR OF PARIPROSTHETIC FRACTURE - POSTERIOR APPROACH;  Surgeon: Fidelia Villalba MD;  [facility]: [facility];  Service: Orthopedics   • MO ESOPHAGOGASTRODUODENOSCOPY TRANSORAL DIAGNOSTIC N/A 5/11/2021    Procedure: ESOPHAGOGASTRODUODENOSCOPY (EGD); Surgeon: Morelia Perez MD;  Location: [facility];  Service: General   • MO ESOPHAGUS SURG PROCEDURE UNLISTED N/A 5/11/2021    Procedure: FUNDOPLICATION TRANSORAL INCISIONLESS;  Surgeon: Rick Evans MD;  [facility];  Service: Gastroenterology   • MO LAP, REPAIR PARAESOPHAGEAL HERNIA, INCL FUNDOPLASTY W/ MESH N/A 5/11/2021    Procedure: REPAIR HERNIA PARAESOPHAGEAL  LAPAROSCOPIC;  Surgeon: Morelia Perez MD;  [facility];  Service: General   • MO TOTAL HIP ARTHROPLASTY Right 12/11/2019    Procedure: ARTHROPLASTY HIP TOTAL ANTERIOR;  Surgeon: Fidelia Villalba MD;  [facility];  Service: Orthopedics   • RHINOPLASTY     • SINUS SURGERY     • TRANSORAL INCISIONLESS FUNDOPLICATION (TIF)  66/86/1503       Meds/Allergies:    Prior to Admission medications    Medication Sig Start Date End Date Taking? Authorizing Provider   acetaminophen (TYLENOL) 325 mg tablet Take 3 tablets (975 mg total) by mouth every 8 (eight) hours  Patient taking differently: Take 975 mg by mouth every 8 (eight) hours as needed 5/14/21  Yes ABENA Quinones   amLODIPine (NORVASC) 5 mg tablet TAKE 1 TABLET (5 MG TOTAL) BY MOUTH DAILY   7/10/22  Yes Rell Hobson MD   ferrous sulfate 325 (65 Fe) mg tablet Take 1 tablet (325 mg total) by mouth 2 (two) times a day with meals 1/12/22  Yes Dagmar Mcgrath PA-C   levothyroxine 25 mcg tablet TAKE 1 TABLET BY MOUTH EVERY DAY 3/18/22  Yes Anastacio Leiva MD   LORazepam (ATIVAN) 2 mg tablet Take 1 tablet (2 mg total) by mouth every 6 (six) hours as needed for anxiety 10/3/22  Yes Anastacio Leiva MD   ondansetron [facility]) 4 mg tablet Take 1 tablet (4 mg total) by mouth every 8 (eight) hours as needed for nausea or vomiting 10/21/22  Yes Anastacio Leiva MD   pantoprazole (PROTONIX) 40 mg tablet Take 1 tablet (40 mg total) by mouth 2 (two) times a day 7/20/22  Yes Akua Costa,    PARoxetine (PAXIL) 30 mg tablet Take 2 tablets (60mg total) by mouth daily 2/14/22  Yes Anastacio Leiva MD   prochlorperazine (COMPAZINE) 10 mg tablet Take 1 tablet (10 mg total) by mouth every 6 (six) hours as needed for nausea or vomiting 12/14/22  Yes Hollis Elizalde MD   QUEtiapine (SEROquel XR) 400 mg 24 hr tablet TAKE 1 TABLET (400 MG TOTAL) BY MOUTH DAILY AT BEDTIME 11/3/22  Yes ABENA Goldstein   sucralfate (CARAFATE) 1 g tablet TAKE 1 TABLET (1 G TOTAL) BY MOUTH 3 (THREE) TIMES A DAY WITH MEALS 9/24/22  Yes Anastacio Leiva MD   triamcinolone (KENALOG) 0 1 % cream For acute flares, apply topically twice daily for up to 2 weeks at a time and then take one week off  DO NOT use on the face, armpits, and groin area  When not flaring, can use 1-2 times weekly on areas prone to rash  Patient taking differently: if needed For acute flares, apply topically twice daily for up to 2 weeks at a time and then take one week off  DO NOT use on the face, armpits, and groin area  When not flaring, can use 1-2 times weekly on areas prone to rash  9/23/22  Yes Anastacio Leiva MD   ziprasidone (GEODON) 80 mg capsule TAKE 1 CAPSULE BY MOUTH EVERY DAY 3/7/22  Yes Anastacio Leiva MD     Will need to be verified    Allergies:    Allergies   Allergen Reactions   • Risperdal [Risperidone] Shortness Of Breath     Rapid heart beat, SOB   • Zyprexa [Olanzapine] Shortness Of Breath     Rapid heartbeat   • Latex Rash     Band aids, adhesives wears normal underwear, can eat bananas  USE PAPER TAPE       Social History:     Marital Status: Single   Occupation:   Patient Pre-hospital Living Situation: Resides with her mother  Patient Pre-hospital Level of Mobility: Independent  Patient Pre-hospital Diet Restrictions: None  Substance Use History:   Social History     Substance and Sexual Activity   Alcohol Use Not Currently     Social History     Tobacco Use   Smoking Status Never   Smokeless Tobacco Never     Social History     Substance and Sexual Activity   Drug Use Not Currently       Family History:    Family History   Problem Relation Age of Onset   • Uterine cancer Mother    • Esophageal cancer Father    • Colon cancer Maternal Grandmother        Physical Exam:     Vitals:   Blood Pressure: 145/81 (12/17/22 1707)  Pulse: 74 (12/17/22 1707)  Temperature: 97 8 °F (36 6 °C) (12/17/22 1707)  Temp Source: Oral (12/17/22 1011)  Respirations: 20 (12/17/22 1530)  Height: 5' 4" (162 6 cm) (12/17/22 1350)  Weight - Scale: 80 8 kg (178 lb 2 1 oz) (12/17/22 1350)  SpO2: 98 % (12/17/22 1707)    Physical Exam  Constitutional:       Appearance: She is obese  Cardiovascular:      Rate and Rhythm: Normal rate and regular rhythm  Pulses: Normal pulses  Heart sounds: Normal heart sounds  No murmur heard  Pulmonary:      Effort: Pulmonary effort is normal  No respiratory distress  Breath sounds: Normal breath sounds  No wheezing or rales  Abdominal:      General: Bowel sounds are normal  There is no distension  Palpations: Abdomen is soft  Tenderness: There is no abdominal tenderness  There is no guarding  Musculoskeletal:         General: Normal range of motion  Cervical back: Normal range of motion and neck supple  No rigidity  Right lower leg: No edema        Left lower leg: No edema  Skin:     General: Skin is warm and dry  Comments: Multiple superficial excoriations on LE   Neurological:      Mental Status: She is alert  She is disoriented  Cranial Nerves: No cranial nerve deficit  Motor: Weakness present  Additional Data:     Lab Results: I have personally reviewed pertinent reports  Results from last 7 days   Lab Units 12/17/22  1040   WBC Thousand/uL 13 56*   HEMOGLOBIN g/dL 10 7*   HEMATOCRIT % 36 3   PLATELETS Thousands/uL 441*   NEUTROS PCT % 90*   LYMPHS PCT % 4*   MONOS PCT % 5   EOS PCT % 0     Results from last 7 days   Lab Units 12/17/22  1040   SODIUM mmol/L 128*   POTASSIUM mmol/L 3 6   CHLORIDE mmol/L 94*   CO2 mmol/L 22   BUN mg/dL 15   CREATININE mg/dL 0 85   ANION GAP mmol/L 12   CALCIUM mg/dL 10 1   ALBUMIN g/dL 4 1   TOTAL BILIRUBIN mg/dL 0 48   ALK PHOS U/L 110   ALT U/L 31   AST U/L 29   GLUCOSE RANDOM mg/dL 123     Results from last 7 days   Lab Units 12/17/22  1040   INR  0 97     Results from last 7 days   Lab Units 12/17/22  1453 12/17/22  1049   POC GLUCOSE mg/dl 97 110         Results from last 7 days   Lab Units 12/17/22  1354 12/17/22  1111 12/17/22  1040   LACTIC ACID mmol/L 1 2 2 7*  --    PROCALCITONIN ng/ml  --   --  0 05       Imaging: I have personally reviewed pertinent reports  CTA head and neck with and without contrast   Final Result by Satya 6, DO (12/17 1301)      Normal CTA of the neck and brain  Left inferior mastoid air cell opacification  Workstation performed: [id number]         CTA chest ct abdomen pelvis w contrast   Final Result by Tommy Senior MD (12/17 1306)      No pulmonary embolus  Measured RV/LV ratio is within normal limits at less than 0 9  Distal esophageal circumferential wall thickening suggesting esophagitis  Mild bladder wall thickening  Correlate with urinalysis        Workstation performed: [id number]         XR chest 1 view portable   Final Result by Annalise Anne MD (12/17 5960)      No acute cardiopulmonary disease  Distended transverse colon  Recommend abdominal series if clinically warranted  The study was marked in [facility] for immediate notification  Workstation performed: [id number]         CT head without contrast   Final Result by Ernie Poole MD (12/17 4514)      No acute intracranial abnormality  In particular, no evidence of acute territorial infarct or intracranial hemorrhage  Workstation performed: [id number]             EKG, Pathology, and Other Studies Reviewed on Admission:   · EKG:     Allscripts / Epic Records Reviewed: Yes     ** Please Note: This note has been constructed using a voice recognition system   **

## 2022-12-17 NOTE — ASSESSMENT & PLAN NOTE
Chronic, possibly secondary to SIADH given on psych meds  Cont to monitor  Gentle fluid hydration while npo  Serial bmp

## 2022-12-18 LAB
ANION GAP SERPL CALCULATED.3IONS-SCNC: 7 MMOL/L (ref 4–13)
BUN SERPL-MCNC: 15 MG/DL (ref 5–25)
CALCIUM SERPL-MCNC: 9.1 MG/DL (ref 8.3–10.1)
CHLORIDE SERPL-SCNC: 107 MMOL/L (ref 96–108)
CO2 SERPL-SCNC: 22 MMOL/L (ref 21–32)
CREAT SERPL-MCNC: 0.61 MG/DL (ref 0.6–1.3)
GFR SERPL CREATININE-BSD FRML MDRD: 97 ML/MIN/1.73SQ M
GLUCOSE SERPL-MCNC: 97 MG/DL (ref 65–140)
HCT VFR BLD AUTO: 33.8 % (ref 34.8–46.1)
HGB BLD-MCNC: 9.8 G/DL (ref 11.5–15.4)
MAGNESIUM SERPL-MCNC: 2.4 MG/DL (ref 1.6–2.6)
MCH RBC QN AUTO: 19.7 PG (ref 26.8–34.3)
MCHC RBC AUTO-ENTMCNC: 29 G/DL (ref 31.4–37.4)
MCV RBC AUTO: 68 FL (ref 82–98)
PLATELET # BLD AUTO: 370 THOUSANDS/UL (ref 149–390)
PMV BLD AUTO: 8.5 FL (ref 8.9–12.7)
POTASSIUM SERPL-SCNC: 3.4 MMOL/L (ref 3.5–5.3)
RBC # BLD AUTO: 4.97 MILLION/UL (ref 3.81–5.12)
SODIUM SERPL-SCNC: 136 MMOL/L (ref 135–147)
WBC # BLD AUTO: 9.03 THOUSAND/UL (ref 4.31–10.16)

## 2022-12-18 RX ORDER — AMLODIPINE BESYLATE 5 MG/1
5 TABLET ORAL DAILY
Status: DISCONTINUED | OUTPATIENT
Start: 2022-12-18 | End: 2022-12-20 | Stop reason: HOSPADM

## 2022-12-18 RX ORDER — POTASSIUM CHLORIDE 20 MEQ/1
40 TABLET, EXTENDED RELEASE ORAL ONCE
Status: COMPLETED | OUTPATIENT
Start: 2022-12-18 | End: 2022-12-18

## 2022-12-18 RX ADMIN — HEPARIN SODIUM 5000 UNITS: 5000 INJECTION INTRAVENOUS; SUBCUTANEOUS at 06:26

## 2022-12-18 RX ADMIN — AMLODIPINE BESYLATE 5 MG: 5 TABLET ORAL at 10:08

## 2022-12-18 RX ADMIN — SUCRALFATE 1 G: 1 TABLET ORAL at 22:39

## 2022-12-18 RX ADMIN — POTASSIUM CHLORIDE 40 MEQ: 1500 TABLET, EXTENDED RELEASE ORAL at 14:07

## 2022-12-18 RX ADMIN — SUCRALFATE 1 G: 1 TABLET ORAL at 10:01

## 2022-12-18 RX ADMIN — PANTOPRAZOLE SODIUM 40 MG: 40 INJECTION, POWDER, FOR SOLUTION INTRAVENOUS at 10:02

## 2022-12-18 RX ADMIN — HEPARIN SODIUM 5000 UNITS: 5000 INJECTION INTRAVENOUS; SUBCUTANEOUS at 14:10

## 2022-12-18 RX ADMIN — HEPARIN SODIUM 5000 UNITS: 5000 INJECTION INTRAVENOUS; SUBCUTANEOUS at 22:39

## 2022-12-18 NOTE — ASSESSMENT & PLAN NOTE
· Met SIRs criteria with leukocytosis, tachycardia  · Unclear source  · S/p CT imaging no source  UA benign  No meningeal sign on exam  Low procal  · Given IV ctx in the ED   Hold on abx unless should detoriate then will place on broad spectrum abx  · F/u blood cx in process  · Resolved lactic acidosis

## 2022-12-18 NOTE — ASSESSMENT & PLAN NOTE
· Unclear etiology  Likely metabolic encephalopathy  · S/p CTH, CTA head/neck, no acute intracranial source  No identified source of infection   + hyponatremia but chronic and non significantly low  · Will UDS -negative  · Check TSH -1 970  · Check EEG -still pending  · Will consult neurology, psychiatry

## 2022-12-18 NOTE — PROGRESS NOTES
[address] [facility]  Progress Note - Thomas Burnett 1960, 58 y o  female MRN: [id number]  Unit/Bed#: -01 Encounter: [id number]  Primary Care Provider: Kleber Jasmine MD   Date and time admitted to hospital: 12/17/2022 10:04 AM    * SIRS (systemic inflammatory response syndrome) (HCC)  Assessment & Plan  · Met SIRs criteria with leukocytosis, tachycardia  · Unclear source  · S/p CT imaging no source  UA benign  No meningeal sign on exam  Low procal  · Given IV ctx in the ED  Hold on abx unless should detoriate then will place on broad spectrum abx  · F/u blood cx in process  · Resolved lactic acidosis    Acute encephalopathy  Assessment & Plan  · Unclear etiology  Likely metabolic encephalopathy  · S/p CTH, CTA head/neck, no acute intracranial source  No identified source of infection  + hyponatremia but chronic and non significantly low  · Will UDS -negative  · Check TSH -1 970  · Check EEG -still pending  · Will consult neurology, psychiatry    Psychiatric disorder  Assessment & Plan  · Hx of schizophrenia  · Will place psych consult  · Hold psych meds for now given mental state and also they need to be verified      Abdominal pain  Assessment & Plan  · Hard discern HPI given AMS  S/p CT a/p no acute pathology  · Pt w/ hx of rosas's esophagus, hiatal hernia s/p fundoplication  Recent EGD in 12/12  Follows with gastroenterology  · Appreciate speech initiated diet patient did well however placed on diet patient requested from speech  · If should persist s/p improved MS, consider GI consult  · Will place on IV PPI for now    Thrombocytosis  Assessment & Plan  · Unclear etiology, ?  Underlying infxn process though unidentified  · Cont to trend    Hyponatremia  Assessment & Plan  · Chronic, possibly secondary to SIADH given on psych meds   · Although patient does report daily vomiting secondary to hiatal hernia, will monitor  · May require GI input  · Cont to monitor  · Patient continue to receive IV fluids will discontinue at this time patient's sodium levels have stabilized  · Serial bmp    Hypokalemia  Assessment & Plan  Repleted with 40 mEq of K-Dur  · BMP in the a m  Essential hypertension  Assessment & Plan  · Stable        VTE Pharmacologic Prophylaxis: VTE Score: 2 High Risk (Score >/= 5) - Pharmacological DVT Prophylaxis Ordered: heparin  Sequential Compression Devices Ordered  Patient Centered Rounds: I performed bedside rounds with nursing staff today  Discussions with Specialists or Other Care Team Provider: nursing     Education and Discussions with Family / Patient: Updated  (mother) via phone  left message on home phone number    Time Spent for Care: 45 minutes  More than 50% of total time spent on counseling and coordination of care as described above  Current Length of Stay: 1 day(s)  Current Patient Status: Inpatient   Certification Statement: The patient will continue to require additional inpatient hospital stay due to ongoing monitoring pending cultures   Discharge Plan: Anticipate discharge in 24-48 hrs to home with home services  Code Status: Level 1 - Full Code    Subjective:   Patient is doing well she is alert and oriented to self and place but not to day  She does report that she has nausea and vomiting on a daily basis secondary to hiatal hernia  We will continue to monitor this over next day  Diet has been increased and per speech patient did well  Unable to reach mother to clarify some of patient's home or preadmission symptoms, an  baseline    Objective:     Vitals:   Temp (24hrs), Av 1 °F (36 7 °C), Min:97 9 °F (36 6 °C), Max:98 4 °F (36 9 °C)    Temp:  [97 9 °F (36 6 °C)-98 4 °F (36 9 °C)] 97 9 °F (36 6 °C)  HR:  [83-86] 83  BP: (144-147)/(78-89) 146/78  SpO2:  [95 %-97 %] 96 %  Body mass index is 30 58 kg/m²       Input and Output Summary (last 24 hours):   No intake or output data in the 24 hours ending 12/18/22 1719    Physical Exam:   Physical Exam  Constitutional:       General: She is not in acute distress  Appearance: She is not ill-appearing, toxic-appearing or diaphoretic  HENT:      Nose: No congestion  Mouth/Throat:      Pharynx: Oropharynx is clear  Eyes:      General:         Right eye: No discharge  Left eye: No discharge  Conjunctiva/sclera: Conjunctivae normal    Cardiovascular:      Rate and Rhythm: Normal rate  Heart sounds: No murmur heard  No friction rub  No gallop  Pulmonary:      Effort: No respiratory distress  Breath sounds: No stridor  No wheezing, rhonchi or rales  Chest:      Chest wall: No tenderness  Abdominal:      General: There is no distension  Palpations: There is no mass  Tenderness: There is no abdominal tenderness  There is no guarding or rebound  Hernia: No hernia is present  Musculoskeletal:         General: No swelling, tenderness, deformity or signs of injury  Right lower leg: No edema  Left lower leg: No edema  Skin:     Coloration: Skin is not jaundiced or pale  Findings: No bruising, erythema, lesion or rash  Neurological:      Mental Status: She is alert        Comments: Alert x 1 - 2           Additional Data:     Labs:  Results from last 7 days   Lab Units 12/18/22  0513 12/17/22  1040   WBC Thousand/uL 9 03 13 56*   HEMOGLOBIN g/dL 9 8* 10 7*   HEMATOCRIT % 33 8* 36 3   PLATELETS Thousands/uL 370 441*   NEUTROS PCT %  --  90*   LYMPHS PCT %  --  4*   MONOS PCT %  --  5   EOS PCT %  --  0     Results from last 7 days   Lab Units 12/18/22  0513 12/17/22  1040   SODIUM mmol/L 136 128*   POTASSIUM mmol/L 3 4* 3 6   CHLORIDE mmol/L 107 94*   CO2 mmol/L 22 22   BUN mg/dL 15 15   CREATININE mg/dL 0 61 0 85   ANION GAP mmol/L 7 12   CALCIUM mg/dL 9 1 10 1   ALBUMIN g/dL  --  4 1   TOTAL BILIRUBIN mg/dL  --  0 48   ALK PHOS U/L  --  110   ALT U/L  --  31   AST U/L  --  29   GLUCOSE RANDOM mg/dL 97 123     Results from last 7 days   Lab Units 12/17/22  1040   INR  0 97     Results from last 7 days   Lab Units 12/17/22  1845 12/17/22  1453 12/17/22  1049   POC GLUCOSE mg/dl 109 97 110         Results from last 7 days   Lab Units 12/17/22  1354 12/17/22  1111 12/17/22  1040   LACTIC ACID mmol/L 1 2 2 7*  --    PROCALCITONIN ng/ml  --   --  0 05       Lines/Drains:  Invasive Devices     Peripheral Intravenous Line  Duration           Peripheral IV 12/17/22 Left;Ventral (anterior) Hand 1 day    Peripheral IV 12/17/22 Right;Ventral (anterior) Forearm 1 day          Drain  Duration           Urethral Catheter Latex 16 Fr  1 day              Urinary Catheter:  Goal for removal: N/A - Chronic Bernardo               Imaging: Reviewed radiology reports from this admission including: CT head    Recent Cultures (last 7 days):   Results from last 7 days   Lab Units 12/17/22  1111   BLOOD CULTURE  No Growth at 24 hrs  No Growth at 24 hrs  Last 24 Hours Medication List:   Current Facility-Administered Medications   Medication Dose Route Frequency Provider Last Rate   • acetaminophen  650 mg Oral Q6H PRN Maurice Gram, DO     • amLODIPine  5 mg Oral Daily Maurice Gram, DO     • heparin (porcine)  5,000 Units Subcutaneous [facility] Maurice Gram, DO     • levothyroxine  25 mcg Oral Early Morning Maurice Gram, DO     • ondansetron  4 mg Intravenous Q6H PRN Maurice Gram, DO     • oxyCODONE  2 5 mg Oral Once Maurice Gram, DO     • pantoprazole  40 mg Intravenous Q24H [address] Shaina Abigailfabiola Sharif DO     • sucralfate  1 g Oral 4x Daily (AC & HS) Maurice Gram, DO          Today, Patient Was Seen By: ABENA [place]    **Please Note: This note may have been constructed using a voice recognition system  **

## 2022-12-18 NOTE — PHYSICAL THERAPY NOTE
Physical Therapy Evaluation    Patient's Name: Thomas Burnett    Admitting Diagnosis  Lactic acidosis [E87 20]  Hyponatremia [E87 1]  Acute encephalopathy [G93 40]  AMS (altered mental status) [R41 82]    Problem List  Patient Active Problem List   Diagnosis    Lumbar radiculopathy    DDD (degenerative disc disease), lumbar    Spondylolisthesis at L4-L5 level    Localized osteoporosis with current pathological fracture with routine healing    Spinal stenosis of lumbar region with neurogenic claudication    Lumbar spondylosis    T12 compression fracture ([facility])    Synovial cyst of lumbar facet joint    Anxiety    Bulimia nervosa in remission    Constipation    Acquired hypothyroidism    Other fatigue    Serotonin syndrome    Schizoaffective disorder ([facility])    Psychiatric disorder    Dermal hypersensitivity reaction    Elevated BP without diagnosis of hypertension    Essential hypertension    Right hip pain    Chronic bilateral low back pain without sciatica    Iron deficiency anemia due to acute on chronic blood loss    Preop exam for internal medicine    NMS (neuroleptic malignant syndrome)    Fall at home    Esophagitis    Hyponatremia    Problem related to living arrangement    Dizziness    Fever    Acute non-recurrent maxillary sinusitis    Symptomatic anemia    Multiple excoriations    Rash    Cellulitis of left upper extremity    Erosive esophagitis    Melena    Hiatal hernia    Polyp of colon    Word finding difficulty    SIRS (systemic inflammatory response syndrome) ([facility])    Hyperlipidemia    Nausea and vomiting    S/P tooth extraction    Upper GI bleed    Pruritus    Self neglect    Microcytic anemia    Schreiber's esophagus    Obesity, Class I, BMI 30-34 9    Acute encephalopathy    Thrombocytosis    Abdominal pain       Past Medical History  Past Medical History:   Diagnosis Date    Anxiety     Arthritis     Back pain at L4-L5 level     Schreiber esophagus     Bulimia     Colon polyp     Disease of thyroid gland     hypothyroid    Ear problems     GERD (gastroesophageal reflux disease)     History of transfusion     Hyperlipidemia     Hypertension     Hypothyroidism     Leukopenia     Nasal congestion     NMS (neuroleptic malignant syndrome) 01/03/2020    Pneumonia     Rheumatoid arthritis involving right hip (HCC)     Sciatica     Seizure ([address] )     due to medication mix up 8/2018 only one historically    Seizures (HCC)     Synovial cyst of lumbar spine     Vertigo     Weight gain        Past Surgical History  Past Surgical History:   Procedure Laterality Date    BONE MARROW BIOPSY      BREAST SURGERY      enlargement with implants    CLOSED REDUCTION DISTAL FEMUR FRACTURE      COLONOSCOPY      COSMETIC SURGERY      DENTAL SURGERY      HIP ARTHROPLASTY Right 1/2/2020    Procedure: REVISION TOTAL HIP ARTHROPLASTY WITH REPAIR OF PARIPROSTHETIC FRACTURE - POSTERIOR APPROACH;  Surgeon: Shi Muñoz MD;  [facility];  Service: Orthopedics    TN ESOPHAGOGASTRODUODENOSCOPY TRANSORAL DIAGNOSTIC N/A 5/11/2021    Procedure: ESOPHAGOGASTRODUODENOSCOPY (EGD); Surgeon: Dereck Dias MD;  [facility];  Service: General    TN ESOPHAGUS SURG PROCEDURE UNLISTED N/A 5/11/2021    Procedure: FUNDOPLICATION TRANSORAL INCISIONLESS;  Surgeon: Rick Leahy MD;  [facility];  Service: Gastroenterology    TN LAP, REPAIR PARAESOPHAGEAL HERNIA, INCL FUNDOPLASTY W/ MESH N/A 5/11/2021    Procedure: REPAIR HERNIA PARAESOPHAGEAL  LAPAROSCOPIC;  Surgeon:  Dereck Dias MD;  [facility];  Service: General    TN TOTAL HIP ARTHROPLASTY Right 12/11/2019    Procedure: ARTHROPLASTY HIP TOTAL ANTERIOR;  Surgeon: Shi Muñoz MD;  [facility];  Service: Orthopedics    RHINOPLASTY      SINUS SURGERY      TRANSORAL INCISIONLESS FUNDOPLICATION (TIF)  99/45/2871        12/18/22 1153   PT Last Visit   PT Visit Date 12/18/22   Note Type   Note type Evaluation   Pain Assessment   Pain Assessment Tool 0-10   Pain Score No Pain   Restrictions/Precautions   Weight Bearing Precautions Per Order No   Other Precautions Cognitive; Chair Alarm; Bed Alarm;Multiple lines; Fall Risk;Impulsive   Home Living   Type of Home House  (ranch)   Home Layout One level;Stairs to enter with rails; Laundry in basement  (2 FAMILIA)   Prior Function   Level of [place] Independent with ADLs; Independent with functional mobility; Independent with IADLS  (I ambulation without AD)   Lives With Other (Comment)  (elderly mother w/ dementia (pt is mother's PCG))   IADLs Independent with driving; Independent with meal prep; Independent with medication management   Falls in the last 6 months 0   Vocational On disability   General   Family/Caregiver Present No   Cognition   Arousal/Participation Alert   Orientation Level Oriented to person;Oriented to place;Oriented to time;Disoriented to situation  (general time)   Memory Decreased recall of precautions   Following Commands Follows one step commands with increased time or repetition   Comments pleasant + cooperative, impulsive, difficulty w/ word finding   Subjective   Subjective Pt agreeable to mobilize  RLE Assessment   RLE Assessment WFL   LLE Assessment   LLE Assessment WFL   Coordination   Movements are Fluid and Coordinated 1   Bed Mobility   Supine to Sit 6  Modified independent   Additional items HOB elevated   Sit to Supine Unable to assess   Additional Comments Pt greeted in supine  Transfers   Sit to Stand 7  Independent   Additional items Increased time required   Stand to Sit 7  Independent   Additional items Increased time required   Additional Comments no AD   Ambulation/Elevation   Gait pattern Inconsistent abundio; Improper Weight shift   Gait Assistance 5  Supervision   Additional items Verbal cues  (for pacing + safety w/ lines)   Assistive Device Other (Comment)  (IV pole)   Distance 150'x2   Stair Management Assistance 5  Supervision   Additional items Verbal cues   Stair Management Technique One rail L   Number of Stairs 2   Ambulation/Elevation Additional Comments no overt LOB   Balance   Static Sitting Good   Dynamic Sitting Fair +   Static Standing Fair   Dynamic Standing Fair -   Ambulatory Fair -   Endurance Deficit   Endurance Deficit Yes   Endurance Deficit Description SOB (however SpO2 94% RA and above)   Activity Tolerance   Activity Tolerance Patient tolerated treatment well;Patient limited by fatigue  (cognition / safety)   Medical Staff Made Aware   [facility] yes - cleared + updated   Assessment   Assessment Pt is 58 y o  female seen for a PT evaluation s/p admit to [facility] on 12/17/2022  Pt presenting w/ AMS + SOB, principal dx of SIRS  Of note pt has PMH significant for schizophrenia  Please see above for other active problem list / PMH  PT now consulted to assess functional mobility and needs for safe d/c planning  Prior to admission, pt was I w/ ambulation without AD, 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, lives in a [address] [place] [address] w/ 2 FAMILIA  Currently pt is [place] for bed skills; I for functional transfers; S for ambulation w/o AD; S for stair negotiation  Pt presents close to functional baseline  Recommend for pt to mobilize w/ restorative + nursing staff while connected to lines due to pt's disregard for safety w/ line management  Defer to OT for cognitive assessment  No further skilled acute PT needs  Will d/c from caseload     Barriers to Discharge Inaccessible home environment;Decreased caregiver support   Goals   Patient Goals go home   PT Treatment Day 0   Plan   PT Frequency (S)    (d/c PT)   Recommendation   PT Discharge Recommendation (S)  No rehabilitation needs   AM-PAC Basic Mobility Inpatient   Turning in Bed Without Bedrails 4   Lying on Back to Sitting on Edge of Flat Bed 4   Moving Bed to Chair 4   Standing Up From Chair 4   Walk in Room 3   Climb 3-5 Stairs 3   Basic Mobility Inpatient Raw Score 22 Basic Mobility Standardized Score 47 4   Highest Level Of Mobility   -HLM Goal 7: Walk 25 feet or more   JH-HLM Achieved 8: Walk 250 feet ot more   End of Consult   Patient Position at End of Consult Bedside chair;Bed/Chair alarm activated; All needs within reach  (all lines in tact, BLE elevated)     Halley Calhoun, PT, DPT

## 2022-12-18 NOTE — CASE MANAGEMENT
Case Management Assessment    Patient name Reynaldo Caceres /-92 MRN [id number]  : 1960 Date 2022       Current Admission Date: 2022  Current Admission Diagnosis:SIRS (systemic inflammatory response syndrome) [facility])   Patient Active Problem List    Diagnosis Date Noted   • Acute encephalopathy 2022   • Thrombocytosis 2022   • Abdominal pain 2022   • Obesity, Class I, BMI 30-34 9 2022   • Schreiber's esophagus 2022   • Microcytic anemia 2022   • Self neglect 2022   • Pruritus 2022   • Upper GI bleed 01/10/2022   • Nausea and vomiting 10/22/2021   • S/P tooth extraction 10/22/2021   • [facility]systemic inflammatory response syndrome) [facility] 2021   • Hyperlipidemia 2021   • Word finding difficulty 2021   • Hiatal hernia 2021   • Polyp of colon 2021   • Melena 2020   • Cellulitis of left upper extremity 2020   • Erosive esophagitis 2020   • Rash 2020   • Symptomatic anemia 2020   • Multiple excoriations 2020   • Acute non-recurrent maxillary sinusitis 2020   • Fever 2020   • Dizziness 2020   • Fall at home 2020   • Esophagitis 2020   • Hyponatremia 2020   • Problem related to living arrangement 2020   • NMS (neuroleptic malignant syndrome) 2020   • Preop exam for internal medicine 2019   • Iron deficiency anemia due to acute on chronic blood loss 2019   • Chronic bilateral low back pain without sciatica 2019   • Right hip pain 07/15/2019   • Essential hypertension 2019   • Elevated BP without diagnosis of hypertension 2019   • Dermal hypersensitivity reaction 2018   • Psychiatric disorder 2018   • Schizoaffective disorder ([address] ) 2018   • Serotonin syndrome 2018   • Other fatigue 2018   • Spinal stenosis of lumbar region with neurogenic claudication 06/15/2018 • Lumbar spondylosis 06/15/2018   • T12 compression fracture (HCC) 06/15/2018   • Synovial cyst of lumbar facet joint 06/15/2018   • Localized osteoporosis with current pathological fracture with routine healing 05/25/2018   • Lumbar radiculopathy 03/19/2018   • DDD (degenerative disc disease), lumbar 03/19/2018   • Spondylolisthesis at L4-L5 level 03/19/2018   • Anxiety 10/31/2016   • Acquired hypothyroidism 10/31/2016   • Constipation 04/10/2014   • Bulimia nervosa in remission 03/19/2014      LOS (days): 1  Geometric Mean LOS (GMLOS) (days):   Days to GMLOS:     OBJECTIVE:    Risk of Unplanned Readmission Score: 21         Current admission status: Inpatient       Preferred Pharmacy:   [facility] [address]  Phone: [phone] Fax: [phone]    Primary Care Provider: Rupesh Hendrickson MD    [email or web address]  Secondary Insurance: Pat Stiven    ASSESSMENT:  [address] [facility] Representative - Mother   Primary Phone: [phone] (Mobile)  Home Phone: [phone]             Pt admitted with acute encephalopathy  TC to mother to discuss CM role and DCP   LM with request for RTC

## 2022-12-18 NOTE — ASSESSMENT & PLAN NOTE
· Hard discern HPI given AMS  S/p CT a/p no acute pathology  · Pt w/ hx of rosas's esophagus, hiatal hernia s/p fundoplication  Recent EGD in 12/12   Follows with gastroenterology  · Appreciate speech initiated diet patient did well however placed on diet patient requested from speech  · If should persist s/p improved MS, consider GI consult  · Will place on IV PPI for now

## 2022-12-18 NOTE — ASSESSMENT & PLAN NOTE
· Chronic, possibly secondary to SIADH given on psych meds   · Although patient does report daily vomiting secondary to hiatal hernia, will monitor  · May require GI input  · Cont to monitor  · Patient continue to receive IV fluids will discontinue at this time patient's sodium levels have stabilized  · Serial bmp

## 2022-12-18 NOTE — SPEECH THERAPY NOTE
Speech Language/Pathology  Speech-Language Pathology Bedside Swallow Evaluation      Patient Name: Guy MARTIN'S Date: 12/18/2022     Problem List  Principal Problem:    SIRS (systemic inflammatory response syndrome) (HCC)  Active Problems:    Psychiatric disorder    Essential hypertension    Hyponatremia    Acute encephalopathy    Thrombocytosis    Abdominal pain      Past Medical History  Past Medical History:   Diagnosis Date   • Anxiety    • Arthritis    • Back pain at L4-L5 level    • Schreiber esophagus    • Bulimia    • Colon polyp    • Disease of thyroid gland     hypothyroid   • Ear problems    • GERD (gastroesophageal reflux disease)    • History of transfusion    • Hyperlipidemia    • Hypertension    • Hypothyroidism    • Leukopenia    • Nasal congestion    • NMS (neuroleptic malignant syndrome) 01/03/2020   • Pneumonia    • Rheumatoid arthritis involving right hip (HCC)    • Sciatica    • Seizure ([address] )     due to medication mix up 8/2018 only one historically   • Seizures (HCC)    • Synovial cyst of lumbar spine    • Vertigo    • Weight gain        Past Surgical History  Past Surgical History:   Procedure Laterality Date   • BONE MARROW BIOPSY     • BREAST SURGERY      enlargement with implants   • CLOSED REDUCTION DISTAL FEMUR FRACTURE     • COLONOSCOPY     • COSMETIC SURGERY     • DENTAL SURGERY     • HIP ARTHROPLASTY Right 1/2/2020    Procedure: REVISION TOTAL HIP ARTHROPLASTY WITH REPAIR OF PARIPROSTHETIC FRACTURE - POSTERIOR APPROACH;  Surgeon: Blayne Gregg MD;  [facility];  Service: Orthopedics   • UT ESOPHAGOGASTRODUODENOSCOPY TRANSORAL DIAGNOSTIC N/A 5/11/2021    Procedure: ESOPHAGOGASTRODUODENOSCOPY (EGD); Surgeon:  Jannet Dennis MD;  [facility];  Service: General   • UT ESOPHAGUS SURG PROCEDURE UNLISTED N/A 5/11/2021    Procedure: FUNDOPLICATION TRANSORAL INCISIONLESS;  Surgeon: Maggie Parker MD;  [facility];  Service: Gastroenterology   • UT LAP, REPAIR PARAESOPHAGEAL HERNIA, INCL FUNDOPLASTY W/ MESH N/A 5/11/2021    Procedure: REPAIR HERNIA PARAESOPHAGEAL  LAPAROSCOPIC;  Surgeon: Suad Robbins MD;  [facility];  Service: General   • [place] TOTAL HIP ARTHROPLASTY Right 12/11/2019    Procedure: ARTHROPLASTY HIP TOTAL ANTERIOR;  Surgeon: Jaden Weber MD;  [facility];  Service: Orthopedics   • RHINOPLASTY     • SINUS SURGERY     • TRANSORAL INCISIONLESS FUNDOPLICATION (TIF)  15/57/2177       Summary   Pt presented with functional appearing oral and pharyngeal stage swallowing skills with materials administered today  She has some oral tremors/shaking w/ manipulation and intake, jaw clenching noted w/ intake  Able to fully clear the oral cavity  Does not have dentures here and requested the "chopped up foods"  Risk/s for Aspiration: low      Recommended Diet: soft/level 3 diet and thin liquids   Recommended Form of Meds: as desired   Aspiration precautions and swallowing strategies: upright posture, only feed when fully alert, slow rate of feeding, small bites/sips and alternating bites and sips  Other Recommendations: Continue frequent oral care, no f/u needed  Pt w/ functional manipulation of the rec'd diet  If she desires regular may return w/o speech intervention  Current Medical Status  Pt is a 58 y o  female who [facility] with change in MS  Not entirely clear who called EMS to pt's home stating that patient was not feeling well  Per her mom she states patient was completely incoherent  On presentation pt reporting that she is unable to breathe then reporting of cp, abd pain, nausea  Pt's mom does not recall of any GI issues   She is also unable to tell whether pt has been compliant with her psych meds    Current Precautions:  Fall  Aspiration  Contact  AIrborn  C diff   Seizure  Delirium    Allergies:  No known food allergies    Past medical history:  Please see H&P for details    Special Studies:  CTA chest/abd-No pulmonary embolus      Measured RV/LV ratio is within normal limits at less than 0 9        Distal esophageal circumferential wall thickening suggesting esophagitis      Mild bladder wall thickening  Correlate with urinalysis        Social/Education/Vocational Hx:  Pt lives home     Swallow Information   Current Risks for Dysphagia & Aspiration: known history of dysphagia and AMS  Current Symptoms/Concerns: poorly alert, not able to remain alert to eat  Current Diet: NPO   Baseline Diet: regular diet and thin liquids      Baseline Assessment   Behavior/Cognition: alert  Speech/Language Status: able to participate in conversation and able to follow commands  Patient Positioning: upright in bed  Pain Status/Interventions/Response to Interventions:   No report of or nonverbal indications of pain  Swallow Mechanism Exam  Facial: symmetrical and keeps lips pursed, noted some tremors over face  Labial: WFL  Lingual: WFL  Velum: unable to visualize  Mandible: adequate ROM  Dentition: edentulous and dentures are at home  States she does not need to eat  Vocal quality:clear/adequate   Volitional Cough: strong/productive   Respiratory Status: on RA        Consistencies Assessed and Performance   Consistencies Administered: ice chips, thin liquids, puree, mechanical soft solids and hard solids    Oral Stage: minimal  Mastication was adequate with the materials administered today  Bolus formation and transfer were functional with no significant oral residue noted  No overt s/s reduced oral control  Noted mild oral tremor/shake w/ shaking w/ manipulation of solids  Pharyngeal Stage: WFL  Swallow Mechanics:  Swallowing initiation appeared prompt  Laryngeal rise was palpated and judged to be within functional limits  No coughing, throat clearing, change in vocal quality or respiratory status noted today       Esophageal Concerns: none reported    Strategies and Efficacy: -    Summary and Recommendations (see above)    Results Reviewed with: patient and RN     Treatment Recommended: no - pt requested chopped, can upgrade if md feels able

## 2022-12-18 NOTE — PLAN OF CARE
Problem: Potential for Falls  Goal: Patient will remain free of falls  Description: INTERVENTIONS:  - Educate patient/family on patient safety including physical limitations  - Instruct patient to call for assistance with activity   - Consult OT/PT to assist with strengthening/mobility   - Keep Call bell within reach  - Keep bed low and locked with side rails adjusted as appropriate  - Keep care items and personal belongings within reach  - Initiate and maintain comfort rounds  - Make Fall Risk Sign visible to staff  - Offer Toileting every two Hours, in advance of need  - Initiate/Maintain bed alarm  - Obtain necessary fall risk management equipment: walker  - Apply yellow socks and bracelet for high fall risk patients  - Consider moving patient to room near nurses station  Outcome: Progressing     Problem: MOBILITY - ADULT  Goal: Maintain or return to baseline ADL function  Description: INTERVENTIONS:  -  Assess patient's ability to carry out ADLs; assess patient's baseline for ADL function and identify physical deficits which impact ability to perform ADLs (bathing, care of mouth/teeth, toileting, grooming, dressing, etc )  - Assess/evaluate cause of self-care deficits   - Assess range of motion  - Assess patient's mobility; develop plan if impaired  - Assess patient's need for assistive devices and provide as appropriate  - Encourage maximum independence but intervene and supervise when necessary  - Involve family in performance of ADLs  - Assess for home care needs following discharge   - Consider OT consult to assist with ADL evaluation and planning for discharge  - Provide patient education as appropriate  Outcome: Progressing     Problem: PAIN - ADULT  Goal: Verbalizes/displays adequate comfort level or baseline comfort level  Description: Interventions:  - Encourage patient to monitor pain and request assistance  - Assess pain using appropriate pain scale  - Administer analgesics based on type and severity of pain and evaluate response  - Implement non-pharmacological measures as appropriate and evaluate response  - Consider cultural and social influences on pain and pain management  - Notify physician/advanced practitioner if interventions unsuccessful or patient reports new pain  Outcome: Progressing     Problem: SAFETY ADULT  Goal: Maintain or return to baseline ADL function  Description: INTERVENTIONS:  -  Assess patient's ability to carry out ADLs; assess patient's baseline for ADL function and identify physical deficits which impact ability to perform ADLs (bathing, care of mouth/teeth, toileting, grooming, dressing, etc )  - Assess/evaluate cause of self-care deficits   - Assess range of motion  - Assess patient's mobility; develop plan if impaired  - Assess patient's need for assistive devices and provide as appropriate  - Encourage maximum independence but intervene and supervise when necessary  - Involve family in performance of ADLs  - Assess for home care needs following discharge   - Consider OT consult to assist with ADL evaluation and planning for discharge  - Provide patient education as appropriate  Outcome: Progressing

## 2022-12-18 NOTE — ASSESSMENT & PLAN NOTE
· Hx of schizophrenia  · Will place psych consult  · Hold psych meds for now given mental state and also they need to be verified

## 2022-12-18 NOTE — CONSULTS
Consultation - Neurology   Amanda Samuel 58 y o  female MRN: [id number]  Unit/Bed#: -01 Encounter: [id number]     Assessment/Plan    70-year-old female with history of schizophrenia, NMS, anxiety, hypertension, hyperlipidemia, Schreiber's esophagus, hypothyroidism, bulimia and reported history of seizures (per med review she is not on any seizure medications at this time, details of her seizures are limited)  The patient has been evaluated by neurology two times in the past, for encephalopathy  In January 2020 she was seen for acute encephalopathy -it was felt that this may be secondary to neuroleptic malignant syndrome, EEG was completed that time which was unremarkable for seizure activity  She was again seen in May 2021 for change in mental status with difficulty getting her words out word finding difficulty, work-up was unyielding including a CT of the head and CTA  MRI was completed which showed no acute intracranial pathology  It was felt likely related to sedating meds/toxic metabolic encephalopathy versus mild cognitive impairment  The patient presents to [facility] after EMS was summoned to the house, it was reported the patient was not feeling well  Patient was noted to be incoherent complaining of inability to breath, chest pain, abdominal pain and nausea  She was noted to be confused in the ED  CTA of the head and neck was completed which showed no large vessel occlusion  CT of the head was completed which showed no intracranial abnormality  CT of the chest abdomen pelvis showed no evidence of PE    She did have a slight leukocytosis on arrival and hyponatremia, her platelets were 897 high, her lactic acid was elevated, D-dimer was elevated as well  The patient met criteria for SIRS and was admitted to the hospital service, neurology was asked to evaluate      1  Acute encephalopathy in the setting of SIRS, hyponatremia, abdominal pain, history of underlying psychiatric disorder and reported hx of seizures  She has been seen by neurology in the past for change in mental status, as reviewed above  Suspect the etiology of her change in mental status is secondary to a toxic metabolic encephalopathy, with causes as stated above, this has now resolved  CT/CTA of the head and neck without acute intracranial pathology  EEG is pending with her history of seizures  2  SIRS/abdominal pain -blood cultures pending lactic acidosis resolved, leukocytosis resolved  3  Thrombocytosis  4  Hyponatremia -improving  5  Underlying psych disorder schizophrenia - psychiatry was consulted  -  Continued supportive care  -  Continue to treat / monitor for underlying infectious / metabolic derangements, as per medicine team  -  Check a routine EEG, this was ordered  -  No need for LP  -  CT of head and CTA of head and neck with no large vessel occlusion   -  No need for further neurological imaging    -  Limit CNS affecting medications, as much as possible  -  Supportive and safe environment, delirium precautions    -  TSH normal  -  Frequent neurological checks, notify neurology with any changes in examination  -  Therapies  -  Attending to see and advise, please see attestation for further details    History of Present Illness     Reason for Consult / Principal Problem:     HPI: Cecilio Payton is a 58 y o   female with history of schizophrenia, neuroleptic malignant syndrome, anxiety, hypertension, hyperlipidemia, Schreiber's esophagus, hypothyroidism, bulimia and reported history of seizure, she is on no seizure medication per record review  The patient has been seen by neurology multiple times in the past, she was recently seen in May 2021, she was consulted due to patient feeling confused difficulty getting her words out word finding difficulty  She is a stroke alert at that time with NIH of a 4    CT of head showed no changes CTA showed no LVO, MRI was completed which showed no acute etiology  It was felt likely related to sedating meds/toxic metabolic encephalopathy versus mild cognitive impairment  The patient was seen in January 2020, for acute encephalopathy -it was felt that she may have neuroleptic malignant syndrome, EEG was completed was unremarkable for seizure activity  Toxicology was consulted at that time  There is reports she has a hx of seizures, but unclear  Per record review the patient resides with her 71-year-old mother, EMS was summoned to the house as the patient was not feeling well, the patient was noted to be incoherent  She was complaining of inability to breathe reporting chest pain, abdominal pain and nausea  Per ED evaluation the patient came to the ED secondary to not feeling well and not breathing well, she was noted to be confused in the ED  Blood pressure on arrival was 170/98 SPO2 was 97 pulse was 102  CTA of the head and neck was completed which showed no large vessel occlusion  CT of the head was completed which showed no acute intracranial abnormality, no evidence of acute territorial infarct or intracranial hemorrhage  Labs were completed  CBC did show a leukocytosis of 13  56H  Hemoglobin is 10 7  Platelets was 170  CMP showed a sodium of 128L  COVID testing was negative  D-dimer was 3 5H  Procalcitonin normal  TSH normal  Lactic Acid was 2 7H  Blood cultures pending  Rapid drug screen was negative  Repeat CBC her leukocytosis has improved, her hyponatremia has improved  The patient met criteria for SIRS, unclear source, lactic acidosis resolved, team will follow blood cultures antibiotics were placed on  The patient did have an acute encephalopathy -it was felt that this may be likely metabolic, neurology was consulted in regards to this       She is also Planing of abdominal pain, she was noted to have hyponatremia, psychiatry was consulted with her history of schizophrenia and her psych meds were placed on hold      CTA of the chest abdomen pelvis was completed which showed no pulmonary embolism  Neurology was asked to see in regards to her change in mental status  Patient reports that she is feeling well today, she is back to her baseline neurologically  Denies being confused or having difficulty getting her words out  She denies any shortness of breath that brought her in, or abdominal pain  Reports yesterday the reason she came in was because of shortness of breath and abdominal pain, this has now resolved  He has no complaints       Inpatient consult to Neurology  Consult performed by: Frankey Apo, PA-C  Consult ordered by: Annabelle Brower DO        Review of Systems   12 point view of symptoms as per HPI otherwise negative    Historical Information   Past Medical History:   Diagnosis Date   • Anxiety    • Arthritis    • Back pain at L4-L5 level    • Schreiber esophagus    • Bulimia    • Colon polyp    • Disease of thyroid gland     hypothyroid   • Ear problems    • GERD (gastroesophageal reflux disease)    • History of transfusion    • Hyperlipidemia    • Hypertension    • Hypothyroidism    • Leukopenia    • Nasal congestion    • NMS (neuroleptic malignant syndrome) 01/03/2020   • Pneumonia    • Rheumatoid arthritis involving right hip (HCC)    • Sciatica    • Seizure ([facility] [address] )     due to medication mix up 8/2018 only one historically   • Seizures (HCC)    • Synovial cyst of lumbar spine    • Vertigo    • Weight gain      Past Surgical History:   Procedure Laterality Date   • BONE MARROW BIOPSY     • BREAST SURGERY      enlargement with implants   • CLOSED REDUCTION DISTAL FEMUR FRACTURE     • COLONOSCOPY     • COSMETIC SURGERY     • DENTAL SURGERY     • HIP ARTHROPLASTY Right 1/2/2020    Procedure: REVISION TOTAL HIP ARTHROPLASTY WITH REPAIR OF PARIPROSTHETIC FRACTURE - POSTERIOR APPROACH;  Surgeon: Lulú Walton MD;  [facility]: [facility];  Service: Orthopedics   • [place] ESOPHAGOGASTRODUODENOSCOPY TRANSORAL DIAGNOSTIC N/A 5/11/2021    Procedure: ESOPHAGOGASTRODUODENOSCOPY (EGD); Surgeon: Adilene Vee MD;  Location: [facility];  Service: General   • [place] ESOPHAGUS SURG PROCEDURE UNLISTED N/A 5/11/2021    Procedure: FUNDOPLICATION TRANSORAL INCISIONLESS;  Surgeon: Portia Anaya MD;  [facility];  Service: Gastroenterology   • [place] LAP, REPAIR PARAESOPHAGEAL HERNIA, INCL FUNDOPLASTY W/ MESH N/A 5/11/2021    Procedure: REPAIR HERNIA PARAESOPHAGEAL  LAPAROSCOPIC;  Surgeon: Adilene Vee MD;  [facility];  Service: General   • [place] TOTAL HIP ARTHROPLASTY Right 12/11/2019    Procedure: ARTHROPLASTY HIP TOTAL ANTERIOR;  Surgeon: Cruz Obrien MD;  [facility];  Service: Orthopedics   • RHINOPLASTY     • SINUS SURGERY     • TRANSORAL INCISIONLESS FUNDOPLICATION (TIF)  90/78/7047     Social History   Social History     Substance and Sexual Activity   Alcohol Use Not Currently     Social History     Substance and Sexual Activity   Drug Use Not Currently     E-Cigarette/Vaping   • E-Cigarette Use Never User      E-Cigarette/Vaping Substances   • Nicotine No    • THC No    • CBD No    • Flavoring No    • Other No    • Unknown No      Social History     Tobacco Use   Smoking Status Never   Smokeless Tobacco Never     Family History:   Family History   Problem Relation Age of Onset   • Uterine cancer Mother    • Esophageal cancer Father    • Colon cancer Maternal Grandmother      Review of previous medical records was completed, please see HPI for details       Meds/Allergies   all current active meds have been reviewed, current meds:   Current Facility-Administered Medications   Medication Dose Route Frequency   • acetaminophen (TYLENOL) tablet 650 mg  650 mg Oral Q6H PRN   • heparin (porcine) subcutaneous injection 5,000 Units  5,000 Units Subcutaneous Q8H [facility]   • levothyroxine tablet 25 mcg  25 mcg Oral Early Morning   • ondansetron (ZOFRAN) injection 4 mg  4 mg Intravenous Q6H PRN   • oxyCODONE (ROXICODONE) IR tablet 2 5 mg  2 5 mg Oral Once   • pantoprazole (PROTONIX) injection 40 mg  40 mg Intravenous Q24H [facility]UMIKO   • sodium chloride 0 9 % infusion  50 mL/hr Intravenous Continuous   • sucralfate (CARAFATE) tablet 1 g  1 g Oral 4x Daily (AC & HS)    and PTA meds:   Prior to Admission Medications   Prescriptions Last Dose Informant Patient Reported? Taking? LORazepam (ATIVAN) 2 mg tablet 12/16/2022  No Yes   Sig: Take 1 tablet (2 mg total) by mouth every 6 (six) hours as needed for anxiety   PARoxetine (PAXIL) 30 mg tablet 12/16/2022  No Yes   Sig: Take 2 tablets (60mg total) by mouth daily   QUEtiapine (SEROquel XR) 400 mg 24 hr tablet 12/16/2022  No Yes   Sig: TAKE 1 TABLET (400 MG TOTAL) BY MOUTH DAILY AT BEDTIME   acetaminophen (TYLENOL) 325 mg tablet 12/16/2022  No Yes   Sig: Take 3 tablets (975 mg total) by mouth every 8 (eight) hours   Patient taking differently: Take 975 mg by mouth every 8 (eight) hours as needed   amLODIPine (NORVASC) 5 mg tablet 12/16/2022  No Yes   Sig: TAKE 1 TABLET (5 MG TOTAL) BY MOUTH DAILY     ferrous sulfate 325 (65 Fe) mg tablet 12/16/2022  No Yes   Sig: Take 1 tablet (325 mg total) by mouth 2 (two) times a day with meals   levothyroxine 25 mcg tablet 12/16/2022  No Yes   Sig: TAKE 1 TABLET BY MOUTH EVERY DAY   ondansetron (ZOFRAN) 4 mg tablet 12/16/2022  No Yes   Sig: Take 1 tablet (4 mg total) by mouth every 8 (eight) hours as needed for nausea or vomiting   pantoprazole (PROTONIX) 40 mg tablet 12/16/2022  No Yes   Sig: Take 1 tablet (40 mg total) by mouth 2 (two) times a day   prochlorperazine (COMPAZINE) 10 mg tablet 12/16/2022  No Yes   Sig: Take 1 tablet (10 mg total) by mouth every 6 (six) hours as needed for nausea or vomiting   sucralfate (CARAFATE) 1 g tablet 12/16/2022  No Yes   Sig: TAKE 1 TABLET (1 G TOTAL) BY MOUTH 3 (THREE) TIMES A DAY WITH MEALS   triamcinolone (KENALOG) 0 1 % cream Past Week  No Yes   Sig: For acute flares, apply topically twice daily for up to 2 weeks at a time and then take one week off  DO NOT use on the face, armpits, and groin area  When not flaring, can use 1-2 times weekly on areas prone to rash  Patient taking differently: if needed For acute flares, apply topically twice daily for up to 2 weeks at a time and then take one week off  DO NOT use on the face, armpits, and groin area  When not flaring, can use 1-2 times weekly on areas prone to rash  ziprasidone (GEODON) 80 mg capsule 12/16/2022  No Yes   Sig: TAKE 1 CAPSULE BY MOUTH EVERY DAY      Facility-Administered Medications: None     Allergies   Allergen Reactions   • Risperdal [Risperidone] Shortness Of Breath     Rapid heart beat, SOB   • Zyprexa [Olanzapine] Shortness Of Breath     Rapid heartbeat   • Latex Rash     Band aids, adhesives wears normal underwear, can eat bananas  USE PAPER TAPE     Objective   Vitals:Blood pressure 147/84, pulse 85, temperature 98 4 °F (36 9 °C), resp  rate 20, height 5' 4" (1 626 m), weight 80 8 kg (178 lb 2 1 oz), SpO2 95 %, not currently breastfeeding  ,Body mass index is 30 58 kg/m²  Intake/Output Summary (Last 24 hours) at 12/18/2022 0835  Last data filed at 12/17/2022 1345  Gross per 24 hour   Intake 1050 ml   Output 900 ml   Net 150 ml     Invasive Devices: Invasive Devices     Peripheral Intravenous Line  Duration           Peripheral IV 12/17/22 Left;Ventral (anterior) Hand <1 day    Peripheral IV 12/17/22 Right;Ventral (anterior) Forearm <1 day          Drain  Duration           Urethral Catheter Latex 16 Fr  <1 day              Constitutional - in NAD, lying in bed  HEENT - NC/AT, no icterus noted, conjuctiva clear, oral mucosa free of exudate  Cardiac - No murmur noted, rate regular  Lungs - Clear to auscultation bilaterally, no wheezing noted    Abdomen - Soft and non tender, non distended  Extremities - No edema noted  Skin - no rashes noted  Neurological  Mental status - the patient is awake alert oriented x 3, with no evidence of aphasia or dysarthria ( she does have some slowing to her speech and slowness with retrieval no rey aphasia), patient is able to follow simple commands, is able to follow complex commands  She is able to repeat, tell me the president, provide some hx, she is able to do simple calculations  Cranial nerves 2 through 12 are intact  Motor - 5/5 upper extremities and lower extremities, without drift, normal tone and bulk  No tremor or fixed deficit noted  Rapid movements are equal bilaterally  Sensation - nonfocal to touch, no neglect noted  Coordination - no ataxia noted on ftn  Toes are downgoing bilaterally  No evidence of seizure activity, observed       Lab Results:    Recent Results (from the past 24 hour(s))   CBC and differential    Collection Time: 12/17/22 10:40 AM   Result Value Ref Range    WBC 13 56 (H) 4 31 - 10 16 Thousand/uL    RBC 5 37 (H) 3 81 - 5 12 Million/uL    Hemoglobin 10 7 (L) 11 5 - 15 4 g/dL    Hematocrit 36 3 34 8 - 46 1 %    MCV 68 (L) 82 - 98 fL    MCH 19 9 (L) 26 8 - 34 3 pg    MCHC 29 5 (L) 31 4 - 37 4 g/dL    MPV 8 5 (L) 8 9 - 12 7 fL    Platelets 677 (H) 879 - 390 Thousands/uL    nRBC 0 /100 WBCs    Neutrophils Relative 90 (H) 43 - 75 %    Immat GRANS % 1 0 - 2 %    Lymphocytes Relative 4 (L) 14 - 44 %    Monocytes Relative 5 4 - 12 %    Eosinophils Relative 0 0 - 6 %    Basophils Relative 0 0 - 1 %    Neutrophils Absolute 12 18 (H) 1 85 - 7 62 Thousands/µL    Immature Grans Absolute 0 07 0 00 - 0 20 Thousand/uL    Lymphocytes Absolute 0 59 (L) 0 60 - 4 47 Thousands/µL    Monocytes Absolute 0 68 0 17 - 1 22 Thousand/µL    Eosinophils Absolute 0 01 0 00 - 0 61 Thousand/µL    Basophils Absolute 0 03 0 00 - 0 10 Thousands/µL   Comprehensive metabolic panel    Collection Time: 12/17/22 10:40 AM   Result Value Ref Range    Sodium 128 (L) 135 - 147 mmol/L    Potassium 3 6 3 5 - 5 3 mmol/L    Chloride 94 (L) 96 - 108 mmol/L    CO2 22 21 - 32 mmol/L ANION GAP 12 4 - 13 mmol/L    BUN 15 5 - 25 mg/dL    Creatinine 0 85 0 60 - 1 30 mg/dL    Glucose 123 65 - 140 mg/dL    Calcium 10 1 8 3 - 10 1 mg/dL    AST 29 5 - 45 U/L    ALT 31 12 - 78 U/L    Alkaline Phosphatase 110 46 - 116 U/L    Total Protein 8 6 (H) 6 4 - 8 4 g/dL    Albumin 4 1 3 5 - 5 0 g/dL    Total Bilirubin 0 48 0 20 - 1 00 mg/dL    eGFR 73 ml/min/1 73sq m   Lipase    Collection Time: 12/17/22 10:40 AM   Result Value Ref Range    Lipase 97 73 - 393 u/L   FLU/RSV/COVID - if FLU/RSV clinically relevant    Collection Time: 12/17/22 10:40 AM    Specimen: Nose; Nares   Result Value Ref Range    SARS-CoV-2 Negative Negative    INFLUENZA A PCR Negative Negative    INFLUENZA B PCR Negative Negative    RSV PCR Negative Negative   Blood gas, venous    Collection Time: 12/17/22 10:40 AM   Result Value Ref Range    pH, Andrew 7 450 (H) 7 300 - 7 400    pCO2, Andrew 30 2 (L) 42 0 - 50 0 mm Hg    pO2, Andrew 83 6 (H) 35 0 - 45 0 mm Hg    HCO3, Andrew 20 5 (L) 24 - 30 mmol/L    Base Excess, Andrew -2 5 mmol/L    O2 Content, Andrew 16 2 ml/dL    O2 HGB, VENOUS 94 8 (H) 60 0 - 80 0 %   HS Troponin 0hr (reflex protocol)    Collection Time: 12/17/22 10:40 AM   Result Value Ref Range    hs TnI 0hr 3 "Refer to ACS Flowchart"- see link ng/L   D-dimer, quantitative    Collection Time: 12/17/22 10:40 AM   Result Value Ref Range    D-Dimer, Quant 3 15 (H) <0 50 ug/ml FEU   Procalcitonin    Collection Time: 12/17/22 10:40 AM   Result Value Ref Range    Procalcitonin 0 05 <=0 25 ng/ml   Protime-INR    Collection Time: 12/17/22 10:40 AM   Result Value Ref Range    Protime 13 1 11 6 - 14 5 seconds    INR 0 97 0 84 - 1 19   APTT    Collection Time: 12/17/22 10:40 AM   Result Value Ref Range    PTT 24 23 - 37 seconds   TSH, 3rd generation with Free T4 reflex    Collection Time: 12/17/22 10:40 AM   Result Value Ref Range    TSH 3RD GENERATON 1 970 0 450 - 4 500 uIU/mL   Fingerstick Glucose (POCT)    Collection Time: 12/17/22 10:49 AM   Result Value Ref Range    POC Glucose 110 65 - 140 mg/dl   Lactic acid    Collection Time: 12/17/22 11:11 AM   Result Value Ref Range    LACTIC ACID 2 7 (HH) 0 5 - 2 0 mmol/L   Blood culture #1    Collection Time: 12/17/22 11:11 AM    Specimen: Arm, Right; Blood   Result Value Ref Range    Blood Culture Received in Microbiology Lab  Culture in Progress  Blood culture #2    Collection Time: 12/17/22 11:11 AM    Specimen: Arm, Left; Blood   Result Value Ref Range    Blood Culture Received in Microbiology Lab  Culture in Progress      HS Troponin I 2hr    Collection Time: 12/17/22 12:46 PM   Result Value Ref Range    hs TnI 2hr 4 "Refer to ACS Flowchart"- see link ng/L    Delta 2hr hsTnI 1 <20 ng/L   UA w Reflex to Microscopic w Reflex to Culture    Collection Time: 12/17/22  1:32 PM    Specimen: Urine, Clean Catch   Result Value Ref Range    Color, UA Light Yellow     Clarity, UA Clear     Specific Gravity, UA 1 032 (H) 1 003 - 1 030    pH, UA 6 0 4 5, 5 0, 5 5, 6 0, 6 5, 7 0, 7 5, 8 0    Leukocytes, UA Negative Negative    Nitrite, UA Negative Negative    Protein, UA Negative Negative mg/dl    Glucose, UA Negative Negative mg/dl    Ketones, UA Negative Negative mg/dl    Urobilinogen, UA <2 0 <2 0 mg/dl mg/dl    Bilirubin, UA Negative Negative    Occult Blood, UA Negative Negative   Rapid drug screen, urine    Collection Time: 12/17/22  1:32 PM   Result Value Ref Range    Amph/Meth UR Negative Negative    Barbiturate Ur Negative Negative    Benzodiazepine Urine Negative Negative    Cocaine Urine Negative Negative    Methadone Urine Negative Negative    Opiate Urine Negative Negative    PCP Ur Negative Negative    THC Urine Negative Negative    Oxycodone Urine Negative Negative   Lactic acid 2 Hours    Collection Time: 12/17/22  1:54 PM   Result Value Ref Range    LACTIC ACID 1 2 0 5 - 2 0 mmol/L   Fingerstick Glucose (POCT)    Collection Time: 12/17/22  2:53 PM   Result Value Ref Range    POC Glucose 97 65 - 140 mg/dl Fingerstick Glucose (POCT)    Collection Time: 12/17/22  6:45 PM   Result Value Ref Range    POC Glucose 109 65 - 140 mg/dl   Magnesium    Collection Time: 12/18/22  5:13 AM   Result Value Ref Range    Magnesium 2 4 1 6 - 2 6 mg/dL   CBC (With Platelets)    Collection Time: 12/18/22  5:13 AM   Result Value Ref Range    WBC 9 03 4 31 - 10 16 Thousand/uL    RBC 4 97 3 81 - 5 12 Million/uL    Hemoglobin 9 8 (L) 11 5 - 15 4 g/dL    Hematocrit 33 8 (L) 34 8 - 46 1 %    MCV 68 (L) 82 - 98 fL    MCH 19 7 (L) 26 8 - 34 3 pg    MCHC 29 0 (L) 31 4 - 37 4 g/dL    Platelets 670 591 - 402 Thousands/uL    MPV 8 5 (L) 8 9 - 12 7 fL   Basic metabolic panel    Collection Time: 12/18/22  5:13 AM   Result Value Ref Range    Sodium 136 135 - 147 mmol/L    Potassium 3 4 (L) 3 5 - 5 3 mmol/L    Chloride 107 96 - 108 mmol/L    CO2 22 21 - 32 mmol/L    ANION GAP 7 4 - 13 mmol/L    BUN 15 5 - 25 mg/dL    Creatinine 0 61 0 60 - 1 30 mg/dL    Glucose 97 65 - 140 mg/dL    Calcium 9 1 8 3 - 10 1 mg/dL    eGFR 97 ml/min/1 73sq m     Procedure: CTA head and neck with and without contrast    Result Date: 12/17/2022  Narrative: CTA NECK AND BRAIN WITH AND WITHOUT CONTRAST INDICATION: Altered mental status COMPARISON:   None  TECHNIQUE:  Routine CT imaging of the Brain without contrast   Post contrast imaging was performed after administration of iodinated contrast through the neck and brain  Post contrast axial 0 625 mm images timed to opacify the arterial system  3D rendering was performed on an independent workstation  MIP reconstructions performed  Coronal reconstructions were performed of the noncontrast portion of the brain  Radiation dose length product (DLP) for this visit:  399 43 mGy-cm   This examination, like all CT scans performed in the [facility], was performed utilizing techniques to minimize radiation dose exposure, including the use of iterative  reconstruction and automated exposure control     IV Contrast: 85 mL of iohexol (OMNIPAQUE) 85 mL of iohexol (OMNIPAQUE)  IMAGE QUALITY:   Diagnostic FINDINGS: NONCONTRAST BRAIN PARENCHYMA:  No intracranial mass, mass effect or midline shift  No CT signs of acute infarction  No acute parenchymal hemorrhage  VENTRICLES AND EXTRA-AXIAL SPACES:  Normal for the patient's age  VISUALIZED ORBITS AND PARANASAL SINUSES:  Normal visualized orbits  Normal visualized paranasal sinuses  CERVICAL VASCULATURE AORTIC ARCH AND GREAT VESSELS:  Normal aortic arch and great vessel origins  Normal visualized subclavian vessels  RIGHT VERTEBRAL ARTERY CERVICAL SEGMENT:  Normal origin  The vessel is normal in caliber throughout the neck  LEFT VERTEBRAL ARTERY CERVICAL SEGMENT:  Normal origin  The vessel is normal in caliber throughout the neck  RIGHT EXTRACRANIAL CAROTID SEGMENT:  Normal caliber common carotid artery  Normal bifurcation and cervical internal carotid artery  No stenosis or dissection  LEFT EXTRACRANIAL CAROTID SEGMENT:  Normal caliber common carotid artery  Normal bifurcation and cervical internal carotid artery  No stenosis or dissection  NASCET criteria was used to determine the degree of internal carotid artery diameter stenosis  INTRACRANIAL VASCULATURE INTERNAL CAROTID ARTERIES:  Normal enhancement of the intracranial portions of the internal carotid arteries  Normal ophthalmic artery origins  Normal ICA terminus  ANTERIOR CIRCULATION:  Symmetric A1 segments and anterior cerebral arteries with normal enhancement  Normal anterior communicating artery  MIDDLE CEREBRAL ARTERY CIRCULATION:  M1 segment and middle cerebral artery branches demonstrate normal enhancement bilaterally  DISTAL VERTEBRAL ARTERIES:  Normal distal vertebral arteries  Posterior inferior cerebellar artery origins are normal  Normal vertebral basilar junction  BASILAR ARTERY:  Basilar artery is normal in caliber  Normal superior cerebellar arteries   POSTERIOR CEREBRAL ARTERIES: Both posterior cerebral arteries arises from the basilar tip  Both arteries demonstrate normal enhancement  Normal posterior communicating arteries  VENOUS STRUCTURES:  Normal  NON VASCULAR ANATOMY BONY STRUCTURES:  No acute osseous abnormality  SOFT TISSUES OF THE NECK:  Unremarkable  THORACIC INLET:  Normal      Impression: Normal CTA of the neck and brain  Left inferior mastoid air cell opacification  Workstation performed: [id number]     Procedure: XR chest 1 view portable    Result Date: 12/17/2022  Narrative: CHEST INDICATION:   altered mental  COMPARISON:  CXR 4/6/2022 and chest CT 11/16/2022  EXAM PERFORMED/VIEWS:  XR CHEST PORTABLE FINDINGS: Cardiomediastinal silhouette appears unremarkable  The lungs are clear  No pneumothorax or pleural effusion  Osseous structures appear within normal limits for patient age  Distended transverse colon  Impression: No acute cardiopulmonary disease  Distended transverse colon  Recommend abdominal series if clinically warranted  The study was marked in [facility] for immediate notification  Workstation performed: [id number]     Procedure: CT head without contrast    Result Date: 12/17/2022  Narrative: CT BRAIN - WITHOUT CONTRAST INDICATION:   Mental status change, persistent or worsening  Altered mental status  COMPARISON:  CT of the head from 5/12/2021 TECHNIQUE:  CT examination of the brain was performed  In addition to axial images, sagittal and coronal 2D reformatted images were created and submitted for interpretation  Radiation dose length product (DLP) for this visit:  890 86 mGy-cm   This examination, like all CT scans performed in the [facility], was performed utilizing techniques to minimize radiation dose exposure, including the use of iterative  reconstruction and automated exposure control  IMAGE QUALITY:  Diagnostic  FINDINGS: PARENCHYMA:  No mass effect or midline shift  No evidence of acute infarction  No acute parenchymal hemorrhage   VENTRICLES AND EXTRA-AXIAL SPACES:  Normal for the patient's age  No extra-axial fluid collections  VISUALIZED ORBITS AND PARANASAL SINUSES:  Unremarkable visualized orbits  Clear paranasal sinuses  There is rightward deviation of the nasal septum  CALVARIUM AND EXTRACRANIAL SOFT TISSUES:  No calvarial fracture  Impression: No acute intracranial abnormality  In particular, no evidence of acute territorial infarct or intracranial hemorrhage  Workstation performed: [id number]     Procedure: CTA chest ct abdomen pelvis w contrast    Result Date: 12/17/2022  Narrative: CT PULMONARY ANGIOGRAM OF THE CHEST AND CT ABDOMEN AND PELVIS WITH INTRAVENOUS CONTRAST INDICATION:   elevated dimer  COMPARISON:  None  TECHNIQUE:  CT examination of the chest, abdomen and pelvis was performed  Thin section CT angiographic technique was used in the chest in order to evaluate for pulmonary embolus and coronal 3D MIP postprocessing was performed on the acquisition scanner  Axial, sagittal, and coronal 2D reformatted images were created from the source data and submitted for interpretation  Radiation dose length product (DLP) for this visit:  1025 53 mGy-cm   This examination, like all CT scans performed in the [facility], was performed utilizing techniques to minimize radiation dose exposure, including the use of iterative reconstruction and automated exposure control  IV Contrast:  85 mL of iohexol (OMNIPAQUE) 85 mL of iohexol (OMNIPAQUE) Enteric Contrast:  Enteric contrast was not administered  FINDINGS: CHEST PULMONARY ARTERIAL TREE:  No pulmonary embolus is seen  LUNGS:  Lungs are clear  There is no tracheal or endobronchial lesion  PLEURA:  Unremarkable  HEART/AORTA:  Heart is unremarkable for patient's age  No thoracic aortic aneurysm  MEDIASTINUM AND FRANSICO:  Distal esophageal circumferential wall thickening suggesting esophagitis  CHEST WALL AND LOWER NECK:  Bilateral saline breast implants   ABDOMEN LIVER/BILIARY TREE: Unremarkable  GALLBLADDER:  Small gallstone    No pericholecystic inflammatory change  SPLEEN:  Unremarkable  PANCREAS:  Unremarkable  ADRENAL GLANDS:  Unremarkable  KIDNEYS/URETERS:  Unremarkable  No hydronephrosis  STOMACH AND BOWEL:  Unremarkable  APPENDIX:  No findings to suggest appendicitis  ABDOMINOPELVIC CAVITY:  No ascites  No pneumoperitoneum  No lymphadenopathy  VESSELS:  Unremarkable for patient's age  PELVIS REPRODUCTIVE ORGANS:  Unremarkable for patient's age  URINARY BLADDER:  Mild diffuse bladder wall thickening  ABDOMINAL WALL/INGUINAL REGIONS:  Unremarkable  OSSEOUS STRUCTURES: Streak artifact from right hip pinning  Degenerative disc disease at L4-L5 and L5-S1 with anterolisthesis of L4 on L5  No acute fracture or destructive osseous lesion  Impression: No pulmonary embolus  Measured RV/LV ratio is within normal limits at less than 0 9  Distal esophageal circumferential wall thickening suggesting esophagitis  Mild bladder wall thickening  Correlate with urinalysis  Workstation performed: [id number]     Imaging Studies: I have personally reviewed pertinent reports  and I have personally reviewed pertinent films in PACS  EKG, Pathology, and Other Studies: I have personally reviewed pertinent reports  Code Status: Level 1 - Full Code    Counseling / Coordination of Care  Reviewed case with neurology attending, history and physical examination, labs and imaging completed, plan of care as per attending physician  Please see attestation for further details

## 2022-12-19 ENCOUNTER — APPOINTMENT (INPATIENT)
Dept: NEUROLOGY | Facility: CLINIC | Age: 62
End: 2022-12-19

## 2022-12-19 LAB
ANION GAP SERPL CALCULATED.3IONS-SCNC: 6 MMOL/L (ref 4–13)
BASOPHILS # BLD AUTO: 0.05 THOUSANDS/ÂΜL (ref 0–0.1)
BASOPHILS NFR BLD AUTO: 1 % (ref 0–1)
BUN SERPL-MCNC: 16 MG/DL (ref 5–25)
CALCIUM SERPL-MCNC: 9.3 MG/DL (ref 8.3–10.1)
CHLORIDE SERPL-SCNC: 107 MMOL/L (ref 96–108)
CO2 SERPL-SCNC: 25 MMOL/L (ref 21–32)
CREAT SERPL-MCNC: 0.59 MG/DL (ref 0.6–1.3)
EOSINOPHIL # BLD AUTO: 0.08 THOUSAND/ÂΜL (ref 0–0.61)
EOSINOPHIL NFR BLD AUTO: 2 % (ref 0–6)
GFR SERPL CREATININE-BSD FRML MDRD: 98 ML/MIN/1.73SQ M
GLUCOSE SERPL-MCNC: 99 MG/DL (ref 65–140)
HCT VFR BLD AUTO: 36.5 % (ref 34.8–46.1)
HGB BLD-MCNC: 10.2 G/DL (ref 11.5–15.4)
IMM GRANULOCYTES # BLD AUTO: 0.02 THOUSAND/UL (ref 0–0.2)
IMM GRANULOCYTES NFR BLD AUTO: 0 % (ref 0–2)
LYMPHOCYTES # BLD AUTO: 0.77 THOUSANDS/ÂΜL (ref 0.6–4.47)
LYMPHOCYTES NFR BLD AUTO: 14 % (ref 14–44)
MCH RBC QN AUTO: 19.9 PG (ref 26.8–34.3)
MCHC RBC AUTO-ENTMCNC: 27.9 G/DL (ref 31.4–37.4)
MCV RBC AUTO: 71 FL (ref 82–98)
MONOCYTES # BLD AUTO: 0.52 THOUSAND/ÂΜL (ref 0.17–1.22)
MONOCYTES NFR BLD AUTO: 10 % (ref 4–12)
NEUTROPHILS # BLD AUTO: 3.9 THOUSANDS/ÂΜL (ref 1.85–7.62)
NEUTS SEG NFR BLD AUTO: 73 % (ref 43–75)
NRBC BLD AUTO-RTO: 0 /100 WBCS
PLATELET # BLD AUTO: 331 THOUSANDS/UL (ref 149–390)
PMV BLD AUTO: 8.5 FL (ref 8.9–12.7)
POTASSIUM SERPL-SCNC: 3.7 MMOL/L (ref 3.5–5.3)
RBC # BLD AUTO: 5.12 MILLION/UL (ref 3.81–5.12)
SODIUM SERPL-SCNC: 138 MMOL/L (ref 135–147)
WBC # BLD AUTO: 5.34 THOUSAND/UL (ref 4.31–10.16)

## 2022-12-19 RX ORDER — LORAZEPAM 1 MG/1
1 TABLET ORAL DAILY
Status: DISCONTINUED | OUTPATIENT
Start: 2022-12-19 | End: 2022-12-20 | Stop reason: HOSPADM

## 2022-12-19 RX ORDER — QUETIAPINE 400 MG/1
400 TABLET, FILM COATED, EXTENDED RELEASE ORAL
Status: DISCONTINUED | OUTPATIENT
Start: 2022-12-19 | End: 2022-12-19

## 2022-12-19 RX ORDER — PAROXETINE HYDROCHLORIDE 20 MG/1
60 TABLET, FILM COATED ORAL DAILY
Status: DISCONTINUED | OUTPATIENT
Start: 2022-12-19 | End: 2022-12-20 | Stop reason: HOSPADM

## 2022-12-19 RX ORDER — PANTOPRAZOLE SODIUM 40 MG/1
40 TABLET, DELAYED RELEASE ORAL
Status: DISCONTINUED | OUTPATIENT
Start: 2022-12-19 | End: 2022-12-20 | Stop reason: HOSPADM

## 2022-12-19 RX ORDER — QUETIAPINE FUMARATE 50 MG/1
50 TABLET, EXTENDED RELEASE ORAL
Status: DISCONTINUED | OUTPATIENT
Start: 2022-12-19 | End: 2022-12-20 | Stop reason: HOSPADM

## 2022-12-19 RX ORDER — ZIPRASIDONE HYDROCHLORIDE 40 MG/1
80 CAPSULE ORAL DAILY
Status: DISCONTINUED | OUTPATIENT
Start: 2022-12-19 | End: 2022-12-20 | Stop reason: HOSPADM

## 2022-12-19 RX ORDER — SUCRALFATE 1 G/1
1 TABLET ORAL
Status: DISCONTINUED | OUTPATIENT
Start: 2022-12-19 | End: 2022-12-19

## 2022-12-19 RX ORDER — ONDANSETRON 4 MG/1
4 TABLET, ORALLY DISINTEGRATING ORAL EVERY 8 HOURS PRN
Status: DISCONTINUED | OUTPATIENT
Start: 2022-12-19 | End: 2022-12-20 | Stop reason: HOSPADM

## 2022-12-19 RX ORDER — AMLODIPINE BESYLATE 5 MG/1
5 TABLET ORAL DAILY
Status: DISCONTINUED | OUTPATIENT
Start: 2022-12-19 | End: 2022-12-19

## 2022-12-19 RX ORDER — LORAZEPAM 1 MG/1
1 TABLET ORAL 3 TIMES DAILY
Status: DISCONTINUED | OUTPATIENT
Start: 2022-12-19 | End: 2022-12-19

## 2022-12-19 RX ORDER — FERROUS SULFATE 325(65) MG
325 TABLET ORAL 2 TIMES DAILY WITH MEALS
Status: DISCONTINUED | OUTPATIENT
Start: 2022-12-19 | End: 2022-12-20 | Stop reason: HOSPADM

## 2022-12-19 RX ADMIN — QUETIAPINE FUMARATE 50 MG: 50 TABLET, EXTENDED RELEASE ORAL at 22:10

## 2022-12-19 RX ADMIN — SUCRALFATE 1 G: 1 TABLET ORAL at 16:50

## 2022-12-19 RX ADMIN — ACETAMINOPHEN 650 MG: 325 TABLET, FILM COATED ORAL at 08:01

## 2022-12-19 RX ADMIN — SUCRALFATE 1 G: 1 TABLET ORAL at 11:38

## 2022-12-19 RX ADMIN — LORAZEPAM 1 MG: 1 TABLET ORAL at 16:50

## 2022-12-19 RX ADMIN — HEPARIN SODIUM 5000 UNITS: 5000 INJECTION INTRAVENOUS; SUBCUTANEOUS at 22:10

## 2022-12-19 RX ADMIN — HEPARIN SODIUM 5000 UNITS: 5000 INJECTION INTRAVENOUS; SUBCUTANEOUS at 14:35

## 2022-12-19 RX ADMIN — SUCRALFATE 1 G: 1 TABLET ORAL at 22:10

## 2022-12-19 RX ADMIN — PANTOPRAZOLE SODIUM 40 MG: 40 INJECTION, POWDER, FOR SOLUTION INTRAVENOUS at 08:52

## 2022-12-19 RX ADMIN — LEVOTHYROXINE SODIUM 25 MCG: 25 TABLET ORAL at 05:08

## 2022-12-19 RX ADMIN — ZIPRASIDONE HYDROCHLORIDE 80 MG: 40 CAPSULE ORAL at 16:53

## 2022-12-19 RX ADMIN — AMLODIPINE BESYLATE 5 MG: 5 TABLET ORAL at 08:01

## 2022-12-19 RX ADMIN — SUCRALFATE 1 G: 1 TABLET ORAL at 07:38

## 2022-12-19 RX ADMIN — PAROXETINE HYDROCHLORIDE 60 MG: 20 TABLET, FILM COATED ORAL at 16:51

## 2022-12-19 RX ADMIN — PANTOPRAZOLE SODIUM 40 MG: 40 TABLET, DELAYED RELEASE ORAL at 16:51

## 2022-12-19 RX ADMIN — HEPARIN SODIUM 5000 UNITS: 5000 INJECTION INTRAVENOUS; SUBCUTANEOUS at 05:08

## 2022-12-19 RX ADMIN — FERROUS SULFATE TAB 325 MG (65 MG ELEMENTAL FE) 325 MG: 325 (65 FE) TAB at 16:50

## 2022-12-19 NOTE — ASSESSMENT & PLAN NOTE
· Hx of schizophrenia  · Appreciate psych and discussed with psych   · Pt takes Seroquel 400 mg at hs - Dr Jatin Manjarrez recommends resuming at 50 mg at hs   · Pt reports 2 mg po ativan daily   · However, psych recommend 1 mg ativan daily for now   · Paxil 60 mg po daily continue resumed 12/19  · Geodon 80 mg daily continue resumed 12/19  · Pt should be good for dc tomorrow 12/20 - Pt does drive

## 2022-12-19 NOTE — ASSESSMENT & PLAN NOTE
· Hard discern HPI given AMS  S/p CT a/p no acute pathology  · Pt w/ hx of rosas's esophagus, hiatal hernia s/p fundoplication  Recent EGD in 12/12   Follows with gastroenterology  · Appreciate speech initiated diet patient did well however placed on diet patient requested from speech  · If should persist s/p improved MS, consider GI consult  · Transition to BID po

## 2022-12-19 NOTE — ASSESSMENT & PLAN NOTE
· Met SIRs criteria with leukocytosis, tachycardia  · Unclear source  · S/p CT imaging no source  UA benign  No meningeal sign on exam  Low procal  · Given IV ctx in the ED   Hold on abx unless should detoriate then will place on broad spectrum abx  · F/u blood cx negative in 48 hrs  · Resolved lactic acidosis

## 2022-12-19 NOTE — PROGRESS NOTES
[address] [facility]  Progress Note - Cecilio Rafaelark 1960, 58 y o  female MRN: [id number]  Unit/Bed#: -01 Encounter: [id number]  Primary Care Provider: Mauro Cherry MD   Date and time admitted to hospital: 12/17/2022 10:04 AM    * SIRS (systemic inflammatory response syndrome) (HCC)  Assessment & Plan  · Met SIRs criteria with leukocytosis, tachycardia  · Unclear source  · S/p CT imaging no source  UA benign  No meningeal sign on exam  Low procal  · Given IV ctx in the ED  Hold on abx unless should detoriate then will place on broad spectrum abx  · F/u blood cx negative in 48 hrs  · Resolved lactic acidosis    Acute encephalopathy  Assessment & Plan  · Unclear etiology  Likely metabolic encephalopathy  · S/p CTH, CTA head/neck, no acute intracranial source  No identified source of infection  + hyponatremia but chronic and non significantly low  · Will UDS -negative  · Check TSH -1 970  · Check EEG -completed not yet final read  · Appreciate neurology, psychiatry    Psychiatric disorder  Assessment & Plan  · Hx of schizophrenia  · Appreciate psych and discussed with psych   · Pt takes Seroquel 400 mg at hs - Dr Paula Guerrero recommends resuming at 50 mg at hs   · Pt reports 2 mg po ativan daily   · However, psych recommend 1 mg ativan daily for now   · Paxil 60 mg po daily continue resumed 12/19  · Geodon 80 mg daily continue resumed 12/19  · Pt should be good for dc tomorrow 12/20 - Pt does drive       Abdominal pain  Assessment & Plan  · Hard discern HPI given AMS  S/p CT a/p no acute pathology  · Pt w/ hx of rosas's esophagus, hiatal hernia s/p fundoplication  Recent EGD in 12/12  Follows with gastroenterology  · Appreciate speech initiated diet patient did well however placed on diet patient requested from speech  · If should persist s/p improved MS, consider GI consult  · Transition to BID po    Thrombocytosis  Assessment & Plan  · Unclear etiology, ?  Underlying infxn process though unidentified  · Cont to trend    Hyponatremia  Assessment & Plan  · Chronic, possibly secondary to SIADH given on psych meds   · Although patient does report daily vomiting secondary to hiatal hernia, will monitor -   · May require GI input  · Cont to monitor  · Patient continue to receive IV fluids will discontinue at this time patient's sodium levels have stabilized  · Serial bmp    Hypokalemia  Assessment & Plan    · BMP in the a m  Essential hypertension  Assessment & Plan  · Stable        VTE Pharmacologic Prophylaxis: VTE Score: 2 High Risk (Score >/= 5) - Pharmacological DVT Prophylaxis Ordered: heparin  Sequential Compression Devices Ordered  Patient Centered Rounds: I performed bedside rounds with nursing staff today  Discussions with Specialists or Other Care Team Provider: nursing     Education and Discussions with Family / Patient: Updated  (mother) via phone  who is 80 yrs old (Call cell phone she doesn't answer house phone)     Time Spent for Care: 90 minutes  More than 50% of total time spent on counseling and coordination of care as described above  Current Length of Stay: 2 day(s)  Current Patient Status: Inpatient   Certification Statement: The patient will continue to require additional inpatient hospital stay due to ongoing need for monitoring on her meds  Discharge Plan: Anticipate discharge in 24-48 hrs to home with home services  Code Status: Level 1 - Full Code    Subjective:   Pt feels well is very monotone has no complaints today eager to return home  Discussed possible dc tomorrow starting meds now    Will monitor denies vomiting while here has not yet had a bm since admission     Objective:     Vitals:   Temp (24hrs), Av °F (36 7 °C), Min:97 6 °F (36 4 °C), Max:98 2 °F (36 8 °C)    Temp:  [97 6 °F (36 4 °C)-98 2 °F (36 8 °C)] 97 6 °F (36 4 °C)  HR:  [64-76] 64  BP: (113-138)/(54-75) 138/73  SpO2:  [94 %-95 %] 95 %  Body mass index is 30 58 kg/m²  Input and Output Summary (last 24 hours): Intake/Output Summary (Last 24 hours) at 12/19/2022 1614  Last data filed at 12/19/2022 0905  Gross per 24 hour   Intake 118 ml   Output 1300 ml   Net -1182 ml       Physical Exam:   Physical Exam  Constitutional:       General: She is not in acute distress  Appearance: She is not ill-appearing, toxic-appearing or diaphoretic  HENT:      Head: Normocephalic  Mouth/Throat:      Pharynx: Oropharynx is clear  Eyes:      General:         Right eye: No discharge  Left eye: No discharge  Conjunctiva/sclera: Conjunctivae normal    Cardiovascular:      Rate and Rhythm: Normal rate  Heart sounds: No murmur heard  No friction rub  No gallop  Pulmonary:      Effort: No respiratory distress  Breath sounds: No stridor  No wheezing, rhonchi or rales  Chest:      Chest wall: No tenderness  Abdominal:      General: There is no distension  Palpations: There is no mass  Tenderness: There is no abdominal tenderness  There is no guarding or rebound  Hernia: No hernia is present  Musculoskeletal:         General: No swelling, tenderness, deformity or signs of injury  Right lower leg: No edema  Left lower leg: No edema  Skin:     Coloration: Skin is not jaundiced or pale  Findings: No bruising, erythema, lesion or rash  Neurological:      Mental Status: She is alert and oriented to person, place, and time     Psychiatric:         Behavior: Behavior normal           Additional Data:     Labs:  Results from last 7 days   Lab Units 12/19/22  0439   WBC Thousand/uL 5 34   HEMOGLOBIN g/dL 10 2*   HEMATOCRIT % 36 5   PLATELETS Thousands/uL 331   NEUTROS PCT % 73   LYMPHS PCT % 14   MONOS PCT % 10   EOS PCT % 2     Results from last 7 days   Lab Units 12/19/22  0439 12/18/22  0513 12/17/22  1040   SODIUM mmol/L 138   < > 128*   POTASSIUM mmol/L 3 7   < > 3 6   CHLORIDE mmol/L 107   < > 94*   CO2 mmol/L 25   < > 22   BUN mg/dL 16   < > 15   CREATININE mg/dL 0 59*   < > 0 85   ANION GAP mmol/L 6   < > 12   CALCIUM mg/dL 9 3   < > 10 1   ALBUMIN g/dL  --   --  4 1   TOTAL BILIRUBIN mg/dL  --   --  0 48   ALK PHOS U/L  --   --  110   ALT U/L  --   --  31   AST U/L  --   --  29   GLUCOSE RANDOM mg/dL 99   < > 123    < > = values in this interval not displayed  Results from last 7 days   Lab Units 12/17/22  1040   INR  0 97     Results from last 7 days   Lab Units 12/17/22  1845 12/17/22  1453 12/17/22  1049   POC GLUCOSE mg/dl 109 97 110         Results from last 7 days   Lab Units 12/17/22  1354 12/17/22  1111 12/17/22  1040   LACTIC ACID mmol/L 1 2 2 7*  --    PROCALCITONIN ng/ml  --   --  0 05       Lines/Drains:  Invasive Devices     Peripheral Intravenous Line  Duration           Peripheral IV 12/17/22 Right;Ventral (anterior) Forearm 2 days          Drain  Duration           Urethral Catheter Latex 16 Fr  2 days              Urinary Catheter:  Goal for removal: N/A - Chronic Bernardo               Imaging: No pertinent imaging reviewed  Recent Cultures (last 7 days):   Results from last 7 days   Lab Units 12/17/22  1111   BLOOD CULTURE  No Growth at 48 hrs  No Growth at 48 hrs         Last 24 Hours Medication List:   Current Facility-Administered Medications   Medication Dose Route Frequency Provider Last Rate   • acetaminophen  650 mg Oral Q6H PRN Tal Schmitz DO     • amLODIPine  5 mg Oral Daily Tal Schmitz DO     • ferrous sulfate  325 mg Oral BID With Meals ABENA Ashley     • heparin (porcine)  5,000 Units Subcutaneous [facility] Tal Schmitz DO     • levothyroxine  25 mcg Oral Early Morning Tal Schmitz DO     • LORazepam  1 mg Oral Daily ABENA Ashley     • ondansetron  4 mg Intravenous Q6H PRN Tal Schmitz DO     • ondansetron  4 mg Oral Q8H PRN ABENA Ashley     • oxyCODONE  2 5 mg Oral Once Tal Schmitz DO     • pantoprazole  40 mg Oral BID AC ABENA Winslow     • PARoxetine  60 mg Oral Daily ABENA Winslow     • QUEtiapine  50 mg Oral HS Justin Mendoza MD     • sucralfate  1 g Oral 4x Daily (AC & HS) Magnus Roe, DO     • ziprasidone  80 mg Oral Daily ABENA Winslow          Today, Patient Was Seen By: ABENA Winslow    **Please Note: This note may have been constructed using a voice recognition system  **

## 2022-12-19 NOTE — UTILIZATION REVIEW
NOTIFICATION OF INPATIENT ADMISSION   AUTHORIZATION REQUEST   SERVICING FACILITY:   [facility]  Address: [address] [place] [address], [address] [place] [address]  Tax ID: [id number]  NPI: [id number] ATTENDING PROVIDER:  Attending Name and NPI#: Ariana Urias [[id number]]  Address: [address] [place] [address]  Phone: [phone]   ADMISSION INFORMATION:  Place of Service: [facility]patient [facility]  Place of Service Code: 21  Inpatient Admission Date/Time: 12/17/22  6:03 PM  Discharge Date/Time: No discharge date for patient encounter  Admitting Diagnosis Code/Description:  Lactic acidosis [E87 20]  Hyponatremia [E87 1]  Acute encephalopathy [G93 40]  AMS (altered mental status) [R41 82]     UTILIZATION REVIEW CONTACT:  Steffen Shook Utilization   Network Utilization Review Department  Phone: [phone]  Fax: [phone]  Email: [email or web address]  Contact for approvals/pending authorizations, clinical reviews, and discharge  PHYSICIAN ADVISORY SERVICES:  Medical Necessity Denial & Ibeq-lh-Qeyx Review  Phone: [phone]  Fax: [phone]  Email: [email or web address]

## 2022-12-19 NOTE — OCCUPATIONAL THERAPY NOTE
Occupational Therapy Evaluation     Patient Name: Guy Ann  [id number]'[id number] Date: 12/19/2022  Problem List  Principal Problem:    SIRS (systemic inflammatory response syndrome) (HCC)  Active Problems:    Psychiatric disorder    Essential hypertension    Hypokalemia    Hyponatremia    Acute encephalopathy    Thrombocytosis    Abdominal pain    Past Medical History  Past Medical History:   Diagnosis Date    Anxiety     Arthritis     Back pain at L4-L5 level     Schreiber esophagus     Bulimia     Colon polyp     Disease of thyroid gland     hypothyroid    Ear problems     GERD (gastroesophageal reflux disease)     History of transfusion     Hyperlipidemia     Hypertension     Hypothyroidism     Leukopenia     Nasal congestion     NMS (neuroleptic malignant syndrome) 01/03/2020    Pneumonia     Rheumatoid arthritis involving right hip ([facility] [address] )     Sciatica     Seizure ([facility] [address] )     due to medication mix up 8/2018 only one historically    Seizures (HCC)     Synovial cyst of lumbar spine     Vertigo     Weight gain      Past Surgical History  Past Surgical History:   Procedure Laterality Date    BONE MARROW BIOPSY      BREAST SURGERY      enlargement with implants    CLOSED REDUCTION DISTAL FEMUR FRACTURE      COLONOSCOPY      COSMETIC SURGERY      DENTAL SURGERY      HIP ARTHROPLASTY Right 1/2/2020    Procedure: REVISION TOTAL HIP ARTHROPLASTY WITH REPAIR OF PARIPROSTHETIC FRACTURE - POSTERIOR APPROACH;  Surgeon: Blayne Gregg MD;  [facility];  Service: Orthopedics    FL ESOPHAGOGASTRODUODENOSCOPY TRANSORAL DIAGNOSTIC N/A 5/11/2021    Procedure: ESOPHAGOGASTRODUODENOSCOPY (EGD); Surgeon:  Jannet Dennis MD;  [facility];  Service: General    FL ESOPHAGUS SURG PROCEDURE UNLISTED N/A 5/11/2021    Procedure: FUNDOPLICATION TRANSORAL INCISIONLESS;  Surgeon: Maggie Parker MD;  [facility];  Service: Gastroenterology    FL LAP, REPAIR PARAESOPHAGEAL HERNIA, INCL FUNDOPLASTY W/ MESH N/A 5/11/2021 Procedure: REPAIR HERNIA PARAESOPHAGEAL  LAPAROSCOPIC;  Surgeon: Latrell Villalta MD;  [facility];  Service: General    NH TOTAL HIP ARTHROPLASTY Right 12/11/2019    Procedure: ARTHROPLASTY HIP TOTAL ANTERIOR;  Surgeon: Tod Peralta MD;  [facility];  Service: Orthopedics    RHINOPLASTY      SINUS SURGERY      TRANSORAL INCISIONLESS FUNDOPLICATION (TIF)  42/46/7441         12/19/22 1110   OT Last Visit   OT Visit Date 12/19/22   Note Type   Note type Evaluation   Pain Assessment   Pain Assessment Tool 0-10   Pain Score No Pain  (c/o reflux)   Restrictions/Precautions   Weight Bearing Precautions Per Order No   Other Precautions Cognitive; Chair Alarm; Fall Risk   Home Living   Type of [address] One level;Stairs to enter with rails; Laundry in basement   Prior Function   Level of [place] Independent with ADLs; Independent with functional mobility; Independent with IADLS   Lives With Family  (mother)   Receives Help From Other (Comment)  (limited)   IADLs Independent with driving; Independent with meal prep; Independent with medication management   Falls in the last 6 months 0   Vocational On disability   Lifestyle   Autonomy I adls and mobility - i iadls including driving and medication management - states mother manages finances   Reciprocal Relationships limited - pt reports she is 1* caretaker of mother however cannot ID what her mother is capable of doing and what she needs help with   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   Subjective   Subjective "I don't feel so good"   ADL   Eating Assistance 7  Independent   Grooming Assistance 5  Supervision/Setup   UB Bathing Assistance 5  Supervision/Setup   LB Bathing Assistance 4  Minimal Assistance   [address] 5  Supervision/Setup   LB Dressing Assistance 4  Minimal Assistance   Toileting Assistance  5  Supervision/Setup   Bed Mobility   Additional Comments oob in chair   Transfers   Sit to Stand 4  Minimal assistance   Stand to Sit 4  Minimal assistance   Functional Mobility   Functional Mobility 4  Minimal assistance   Additional items Hand hold assistance   Balance   Static Sitting Fair +   Dynamic Sitting Fair +   Static Standing Fair   Dynamic Standing Fair -   Ambulatory Fair -   Activity Tolerance   Activity Tolerance Patient limited by fatigue;Treatment limited secondary to medical complications (Comment)   RUE Assessment   RUE Assessment WFL   LUE Assessment   LUE Assessment WFL   Cognition   Arousal/Participation Arousable; Cooperative   Attention Attends with cues to redirect   Orientation Level Oriented to person;Oriented to place  (general time)   Memory Decreased short term memory;Decreased recall of recent events;Decreased recall of precautions  (slow to process/respond at times - noted to have difficulty providing information re: functional level of mother and what pt needs to help her with as pt identifies herself as 1* caretaker for her mother)   Following Commands Follows one step commands with increased time or repetition   Assessment   Limitation Decreased ADL status; Decreased Safe judgement during ADL;Decreased cognition;Decreased endurance;Decreased self-care trans;Decreased high-level ADLs   Prognosis Good   Assessment Pt is a 58 y o  female who was admitted to [facility] on 12/17/2022 with SIRS (systemic inflammatory response syndrome) (HCC) and c/o abdominal pain  Pt's problem list also includes PMH of HTN, previous surgery and DDD  spondylolisthesis, osteoporosis, T12 comp fx, anxiety, bulemia in remission, hypothyroidism, schizoaffective disorder, anterior ELIZABETH, HLD  At baseline pt was completing adls and mobility independently - denies falls - I iadls  Pt lives with mother in 1 story home with 2 FAMILIA  Currently pt requires min assist for overall ADLS and min assist for functional mobility/transfers   Pt currently presents with impairments in the following categories -limited home support, behavioral pattern, difficulty performing ADLS, difficulty performing IADLS , limited insight into deficits, flat affect, decreased initiation and engagement , health management  and environment activity tolerance, endurance, standing balance/tolerance, memory, insight, safety , judgement , attention  and task initiation   These impairments, as well as pt's fatigue, decreased caregiver support, risk for falls and home environment  limit pt's ability to safely engage in all baseline areas of occupation, includingbathing, dressing, toileting, functional mobility/transfers, community mobility, laundry , driving, house maintenance, medication management, meal prep, cleaning, social participation  and leisure activities  From OT standpoint, recommend likely home with home OT upon D/C  OT will continue to follow to address the below stated goals  Goals   Patient Goals feel better   LTG Time Frame 10-14   Long Term Goal #1 refer to established goals below   Plan   Treatment Interventions ADL retraining;Functional transfer training; Endurance training;Cognitive reorientation;Patient/family training;Equipment evaluation/education; Compensatory technique education; Activityengagement   Goal Expiration Date 01/02/23   OT Frequency 2-3x/wk   Recommendation   OT Discharge Recommendation Home with home health rehabilitation   AM-PAC Daily Activity Inpatient   Lower Body Dressing 3   Bathing 3   Toileting 3   Upper Body Dressing 4   Grooming 4   Eating 4   Daily Activity Raw Score 21   Daily Activity Standardized Score (Calc for Raw Score >=11) 44 27   AM-PAC Applied Cognition Inpatient   Following a Speech/Presentation 3   Understanding Ordinary Conversation 4   Taking Medications 3   Remembering Where Things Are Placed or Put Away 3   Remembering List of 4-5 Errands 2   Taking Care of Complicated Tasks 2   Applied Cognition Raw Score 17   Applied Cognition Standardized Score 36 52 End of Consult   Education Provided Yes   Patient Position at End of Consult Bedside chair;Bed/Chair alarm activated; All needs within reach   Nurse Communication Nurse aware of consult       OCCUPATIONAL THERAPY GOALS:    *Mod I adls after setup with use of AE PRN  *Mod I toileting and clothing management   *Mod I functional mobility and transfers to/from all surfaces with good dynamic balance and safety for participation in dynamic adls and iadl tasks   *Demonstrate good carryover with safe use of RW during functional tasks   *Assess DME needs   *Increase activity tolerance to 35-40 minutes for participation in adls and enjoyable activities  *Pt to participate in ongoing functional cognitive assessment with good attention/participation to assist with safe d/c recommendations    The patient's raw score on the AM-PAC Daily Activity inpatient short form is 21, standardized score is 44 27, greater than 39 4  Patients at this level are likely to benefit from discharge to home  Please refer to the recommendation of the Occupational Therapist for safe discharge planning        [facility]

## 2022-12-19 NOTE — ASSESSMENT & PLAN NOTE
· Chronic, possibly secondary to SIADH given on psych meds   · Although patient does report daily vomiting secondary to hiatal hernia, will monitor -   · May require GI input  · Cont to monitor  · Patient continue to receive IV fluids will discontinue at this time patient's sodium levels have stabilized  · Serial bmp

## 2022-12-19 NOTE — UTILIZATION REVIEW
Initial Clinical Review    Observation 12/17 @ 1421 and changed to Inpatient on 12/17 @ 1803  Pt requiring continued stay for  SIRS, Acute encephalopathy, Abdominal pain, Thrombocytosis and Hyponatremia/ Workup and treat    Admission: Date/Time/Statement:   Admission Orders (From admission, onward)     Ordered        12/17/22 1803  Inpatient Admission  Once            12/17/22 1421  Place in Observation  Once                      Orders Placed This Encounter   Procedures   • Inpatient Admission     Standing Status:   Standing     Number of Occurrences:   1     Order Specific Question:   Level of Care     Answer:   Med Surg [16]     Order Specific Question:   Estimated length of stay     Answer:   More than 2 Midnights     Order Specific Question:   Certification     Answer:   I certify that inpatient services are medically necessary for this patient for a duration of greater than two midnights  See H&P and MD Progress Notes for additional information about the patient's course of treatment  ED Arrival Information     Expected   -    Arrival   12/17/2022 10:02    Acuity   Urgent            Means of arrival   Walk-In    Escorted by   [facility]    Service   Hospitalist    Admission type   Emergency            Arrival complaint   AMS           Chief Complaint   Patient presents with   • Altered Mental Status     Pt stating "I cant breath"  unable to answer any questions  Disoriented to time/place/situation       Initial Presentation: 58 y o  female to ED presents for changed in mental status  Pt resides with her 80 yrs old mother  Unclear who called EMS to pt's home stating that pt was not feeling well  Per mother, pt was completely incoherent  On arrival, pt unable to breath, c/o chest pain, abdominal pain and nausea  PMH for Schizophrenia, anxiety, HTN, rosas's esophagus, hypothyroidism  Admit Inpatient level of care for SIRS, Acute encephalopathy, Abdominal pain, Thrombocytosis and Hyponatremia  Given Iv antibiotics in ED  Hold antibiotics unless pt worsens  F/u Bld cultures  Check UDS and TSH  Check EEG  Neurology and Psychiatry consult  NPO due to mental status change  Speech eval pending  Start Iv PPI for now  Continue to trend plts  Na 128, possible chronic  Date: 12/18   Day 2:   Neurology cons; Hyponatremia improving  Lactic acidosis and leukocytosis improved  Check EEG  No need for LP  Frequent neuro checks  Progress notes; EEG still pending  F/u Bld cultures  Pt c/o daily vomiting due to hiatal hernia  Will monitor  Continue IVFs  K 3 4, K-dur 40 meq  BMP in am 12/19  Psychiatry consult pending  On exam; alert x1-2  ED Triage Vitals [12/17/22 1011]   Temperature Pulse Respirations Blood Pressure SpO2   98 7 °F (37 1 °C) 102 18 170/98 97 %      Temp Source Heart Rate Source Patient Position - Orthostatic VS BP Location FiO2 (%)   Oral Monitor -- -- --      Pain Score       6          Wt Readings from Last 1 Encounters:   12/17/22 80 8 kg (178 lb 2 1 oz)     Additional Vital Signs:  12/18/22 08:23:13 98 4 °F (36 9 °C) 85 -- 147/84 105 95 % --   12/17/22 21:19:41 98 °F (36 7 °C) 86 -- 147/89 108 97 % --   12/17/22 18:23:02 -- 84 -- 144/87 106 97 % --   12/17/22 17:07:30 97 8 °F (36 6 °C) 74 -- 145/81 102 98 %      Pertinent Labs/Diagnostic Test Results:   CTA head and neck with and without contrast   Final Result by Satya 6, DO (12/17 1301)      Normal CTA of the neck and brain  Left inferior mastoid air cell opacification  CTA chest ct abdomen pelvis w contrast   Final Result by Rohit Valenzuela MD (12/17 1306)      No pulmonary embolus  Measured RV/LV ratio is within normal limits at less than 0 9  Distal esophageal circumferential wall thickening suggesting esophagitis  Mild bladder wall thickening  Correlate with urinalysis              XR chest 1 view portable   Final Result by Dewayne Pendleton MD (12/17 1154)      No acute cardiopulmonary disease  Distended transverse colon  Recommend abdominal series if clinically warranted  CT head without contrast   Final Result by Jose Granger MD (12/17 1115)      No acute intracranial abnormality  In particular, no evidence of acute territorial infarct or intracranial hemorrhage          12/17  EKG - sinus rhythm    Results from last 7 days   Lab Units 12/17/22  1040   SARS-COV-2  Negative     Results from last 7 days   Lab Units 12/18/22  0513 12/17/22  1040   WBC Thousand/uL 9 03 13 56*   HEMOGLOBIN g/dL 9 8* 10 7*   HEMATOCRIT % 33 8* 36 3   PLATELETS Thousands/uL 370 441*   NEUTROS ABS Thousands/µL  --  12 18*         Results from last 7 days   Lab Units 12/18/22  0513 12/17/22  1040   SODIUM mmol/L 136 128*   POTASSIUM mmol/L 3 4* 3 6   CHLORIDE mmol/L 107 94*   CO2 mmol/L 22 22   ANION GAP mmol/L 7 12   BUN mg/dL 15 15   CREATININE mg/dL 0 61 0 85   EGFR ml/min/1 73sq m 97 73   CALCIUM mg/dL 9 1 10 1   MAGNESIUM mg/dL 2 4  --      Results from last 7 days   Lab Units 12/17/22  1040   AST U/L 29   ALT U/L 31   ALK PHOS U/L 110   TOTAL PROTEIN g/dL 8 6*   ALBUMIN g/dL 4 1   TOTAL BILIRUBIN mg/dL 0 48     Results from last 7 days   Lab Units 12/17/22  1845 12/17/22  1453 12/17/22  1049   POC GLUCOSE mg/dl 109 97 110     Results from last 7 days   Lab Units 12/18/22  0513 12/17/22  1040   GLUCOSE RANDOM mg/dL 97 123       Results from last 7 days   Lab Units 12/17/22  1040   PH JANY  7 450*   PCO2 JANY mm Hg 30 2*   PO2 JANY mm Hg 83 6*   HCO3 JANY mmol/L 20 5*   BASE EXC JANY mmol/L -2 5   O2 CONTENT JANY ml/dL 16 2   O2 HGB, VENOUS % 94 8*             Results from last 7 days   Lab Units 12/17/22  1246 12/17/22  1040   HS TNI 0HR ng/L  --  3   HS TNI 2HR ng/L 4  --    HSTNI D2 ng/L 1  --      Results from last 7 days   Lab Units 12/17/22  1040   D-DIMER QUANTITATIVE ug/ml FEU 3 15*     Results from last 7 days   Lab Units 12/17/22  1040   PROTIME seconds 13 1   INR  0 97   PTT seconds 24     Results from last 7 days   Lab Units 12/17/22  1040   TSH 3RD GENERATON uIU/mL 1 970     Results from last 7 days   Lab Units 12/17/22  1040   PROCALCITONIN ng/ml 0 05     Results from last 7 days   Lab Units 12/17/22  1354 12/17/22  1111   LACTIC ACID mmol/L 1 2 2 7*       Results from last 7 days   Lab Units 12/17/22  1040   LIPASE u/L 97                 Results from last 7 days   Lab Units 12/17/22  1332   CLARITY UA  Clear   COLOR UA  Light Yellow   SPEC GRAV UA  1 032*   PH UA  6 0   GLUCOSE UA mg/dl Negative   KETONES UA mg/dl Negative   BLOOD UA  Negative   PROTEIN UA mg/dl Negative   NITRITE UA  Negative   BILIRUBIN UA  Negative   UROBILINOGEN UA (BE) mg/dl <2 0   LEUKOCYTES UA  Negative     Results from last 7 days   Lab Units 12/17/22  1040   INFLUENZA A PCR  Negative   INFLUENZA B PCR  Negative   RSV PCR  Negative         Results from last 7 days   Lab Units 12/17/22  1332   AMPH/METH  Negative   BARBITURATE UR  Negative   BENZODIAZEPINE UR  Negative   COCAINE UR  Negative   METHADONE URINE  Negative   OPIATE UR  Negative   PCP UR  Negative   THC UR  Negative       Results from last 7 days   Lab Units 12/17/22  1111   BLOOD CULTURE  No Growth at 48 hrs  No Growth at 48 hrs         ED Treatment:   Medication Administration from 12/17/2022 1002 to 12/17/2022 1702       Date/Time Order Dose Route Action     12/17/2022 1047 EST tetanus-diphtheria-acellular pertussis (BOOSTRIX) IM injection 0 5 mL 0 5 mL Intramuscular Given     12/17/2022 1139 EST ceftriaxone (ROCEPHIN) 1 g/50 mL in dextrose IVPB 1,000 mg Intravenous New Bag     12/17/2022 1140 EST sodium chloride 0 9 % bolus 1,000 mL 1,000 mL Intravenous New Bag     12/17/2022 1214 EST iohexol (OMNIPAQUE) 350 MG/ML injection (SINGLE-DOSE) 100 mL 85 mL Intravenous Given     12/17/2022 1217 EST iohexol (OMNIPAQUE) 350 MG/ML injection (SINGLE-DOSE) 85 mL 85 mL Intravenous Given        Past Medical History:   Diagnosis Date   • Anxiety    • Arthritis    • Back pain at L4-L5 level    • Schreiber esophagus    • Bulimia    • Colon polyp    • Disease of thyroid gland     hypothyroid   • Ear problems    • GERD (gastroesophageal reflux disease)    • History of transfusion    • Hyperlipidemia    • Hypertension    • Hypothyroidism    • Leukopenia    • Nasal congestion    • NMS (neuroleptic malignant syndrome) 01/03/2020   • Pneumonia    • Rheumatoid arthritis involving right hip (HCC)    • Sciatica    • Seizure ([address] )     due to medication mix up 8/2018 only one historically   • Seizures (HCC)    • Synovial cyst of lumbar spine    • Vertigo    • Weight gain      Present on Admission:  • Psychiatric disorder  • Essential hypertension      Admitting Diagnosis: Lactic acidosis [E87 20]  Hyponatremia [E87 1]  Acute encephalopathy [G93 40]  AMS (altered mental status) [R41 82]  Age/Sex: 58 y o  female     Admission Orders:  Scheduled Medications:  amLODIPine, 5 mg, Oral, Daily  heparin (porcine), 5,000 Units, Subcutaneous, Q8H [address]  levothyroxine, 25 mcg, Oral, Early Morning  oxyCODONE, 2 5 mg, Oral, Once  pantoprazole, 40 mg, Intravenous, Q24H [address]  sucralfate, 1 g, Oral, 4x Daily (AC & HS)      Continuous IV Infusions:    sodium chloride 0 9 % infusion  Rate: 50 mL/hr Dose: 50 mL/hr  Freq: Continuous Route: IV  Indications of Use: IV Hydration  Last Dose: Stopped (12/18/22 1748)  Start: 12/17/22 1700 End: 12/18/22 1716      PRN Meds:  acetaminophen, 650 mg, Oral, Q6H PRN  ondansetron, 4 mg, Intravenous, Q6H PRN      12/18 Dysphagia/ Modified consistency  Bld cultures x2  IP CONSULT TO NEUROLOGY  IP CONSULT TO PSYCHIATRY    Network Utilization Review Department  ATTENTION: Please call with any questions or concerns to [phone] and carefully listen to the prompts so that you are directed to the right person   All voicemails are confidential   Jennifer Perkins all requests for admission clinical reviews, approved or denied determinations and any other requests to dedicated fax number below belonging to the campus where the patient is receiving treatment   List of dedicated fax numbers for the Facilities:  [address] [place] [address] DENIALS (Administrative/Medical Necessity) [phone]   [address] [place] [address] (Maternity/NICU/Pediatrics) [phone]   [address] [phone]   [facility] [address] [phone]   [address] [address] [facility] [address] [phone]   [facility] [address] 195 [facility] [phone]

## 2022-12-19 NOTE — PLAN OF CARE
Problem: OCCUPATIONAL THERAPY ADULT  Goal: Performs self-care activities at highest level of function for planned discharge setting  See evaluation for individualized goals  Description: Treatment Interventions: ADL retraining, Functional transfer training, Endurance training, Cognitive reorientation, Patient/family training, Equipment evaluation/education, Compensatory technique education, Activityengagement          See flowsheet documentation for full assessment, interventions and recommendations  Note: Limitation: Decreased ADL status, Decreased Safe judgement during ADL, Decreased cognition, Decreased endurance, Decreased self-care trans, Decreased high-level ADLs  Prognosis: Good  Assessment: Pt is a 58 y o  female who was admitted to [facility] on 12/17/2022 with SIRS (systemic inflammatory response syndrome) (HCC) and c/o abdominal pain  Pt's problem list also includes PMH of HTN, previous surgery and DDD  spondylolisthesis, osteoporosis, T12 comp fx, anxiety, bulemia in remission, hypothyroidism, schizoaffective disorder, anterior ELIZABETH, HLD  At baseline pt was completing adls and mobility independently - denies falls - I iadls  Pt lives with mother in 1 story home with 2 FAMILIA  Currently pt requires min assist for overall ADLS and min assist for functional mobility/transfers  Pt currently presents with impairments in the following categories -limited home support, behavioral pattern, difficulty performing ADLS, difficulty performing IADLS , limited insight into deficits, flat affect, decreased initiation and engagement , health management  and environment activity tolerance, endurance, standing balance/tolerance, memory, insight, safety , judgement , attention  and task initiation    These impairments, as well as pt's fatigue, decreased caregiver support, risk for falls and home environment  limit pt's ability to safely engage in all baseline areas of occupation, includingbathing, dressing, toileting, functional mobility/transfers, community mobility, laundry , driving, house maintenance, medication management, meal prep, cleaning, social participation  and leisure activities  From OT standpoint, recommend likely home with home OT upon D/C  OT will continue to follow to address the below stated goals       OT Discharge Recommendation: Home with home health rehabilitation

## 2022-12-19 NOTE — CONSULTS
Consultation - 6 [facility] [place] 58 y o  female MRN: [id number]  Unit/Bed#: -01 Encounter: [id number]      Chief Complaint: I did not take the medication for several days    History of Present Illness   Physician Requesting Consult: Ariana Solis DO  Reason for Consult / Principal Problem: Diagnosis acute encephalopathy, psychiatric disorder, schizoaffective disorder depressive type    Reynaldo Hoskins is a 58 y o  female with a history of hypertension, schizoaffective disorder, hypothyroidism presents with changes in mental status  She had prior admission for the same reason  She stated that she ran out off her medication for several days and was not able to get it in the pharmacy, she denies any other issues, today patient is alert and awake, is able to give information  She follows with a primary physician  She stated that her mood is a stable, she denies any suicidal thoughts plan or intent she does not have any active psychotic symptoms  Her medication was restarted upon admission and she is feeling better  Psychiatric Review Of Systems:  sleep: no  appetite changes: no  weight changes: no  energy/anergy: no  interest/pleasure/anhedonia: no  somatic symptoms: no  anxiety/panic: no  rody: no  guilty/hopeless: no  self injurious behavior/risky behavior: no    Historical Information   Past Psychiatric History:   She had multiple inpatient psych admission at [address]  Last admission was on 2018  Currently in treatment with Primary physician    Past Suicide attempts: yes she cut her wrist in the past  Past Violent behavior: None  Past Psychiatric medication trial: paxil, Tofranil, rsperidone, seroquel, Zyprexa, Geodon, Ativan, Clonazepam    Substance Abuse History:  Denies any drugs or alcohol history     I have assessed this patient for substance use within the past 12 months     History of IP/OP rehabilitation program: None  Smoking history: Never smoked  Family Psychiatric History: Mother: depression  No family history of substance abuse or suicidal attempt    Social History  Education: post college graduate work or degree  Learning Disabilities: none  Marital history: single  Living arrangement, social support: She lives with her mother  Occupational History: on permanent disability  Functioning Relationships: Limited support system    Other Pertinent History: No legal or  history    Traumatic History:   Abuse: None  Other Traumatic Events: None    Past Medical History:   Diagnosis Date   • Anxiety    • Arthritis    • Back pain at L4-L5 level    • Schreiber esophagus    • Bulimia    • Colon polyp    • Disease of thyroid gland     hypothyroid   • Ear problems    • GERD (gastroesophageal reflux disease)    • History of transfusion    • Hyperlipidemia    • Hypertension    • Hypothyroidism    • Leukopenia    • Nasal congestion    • NMS (neuroleptic malignant syndrome) 01/03/2020   • Pneumonia    • Rheumatoid arthritis involving right hip (HCC)    • Sciatica    • Seizure ([facility] [address] )     due to medication mix up 8/2018 only one historically   • Seizures (HCC)    • Synovial cyst of lumbar spine    • Vertigo    • Weight gain        Medical Review Of Systems:  Review of Systems - Negative except her abdominal pain, anxiety, dry mouth, all other systems reviewed were negative    Meds/Allergies   all current active meds have been reviewed  Allergies   Allergen Reactions   • Risperdal [Risperidone] Shortness Of Breath     Rapid heart beat, SOB   • Zyprexa [Olanzapine] Shortness Of Breath     Rapid heartbeat   • Latex Rash     Band aids, adhesives wears normal underwear, can eat bananas  USE PAPER TAPE       Objective   Vital signs in last 24 hours:  Temp:  [97 9 °F (36 6 °C)-98 2 °F (36 8 °C)] 98 2 °F (36 8 °C)  HR:  [75-83] 76  BP: (113-146)/(54-78) 113/54      Intake/Output Summary (Last 24 hours) at 12/19/2022 1421  Last data filed at 12/19/2022 0905  Gross per 24 hour   Intake 118 ml   Output 1300 ml   Net -1182 ml       Mental Status Evaluation:  Appearance:  age appropriate and disheveled   Behavior:  normal   Speech:  normal pitch and normal volume   Mood:  anxious   Affect:  mood-congruent   Language: naming objects and repeating phrases   Thought Process:  goal directed   Associations: intact associations   Thought Content:  normal   Perceptual Disturbances: None   Risk Potential: Suicidal Ideations none, Homicidal Ideations none and Potential for Aggression No   Sensorium:  person, place, time/date and situation   Memory:  recent and remote memory grossly intact   Cognition:  recent and remote memory grossly intact   Consciousness:  alert and awake    Attention: attention span and concentration were age appropriate   Intellect: within normal limits   Fund of Knowledge: awareness of current events: Fair, past history: Fair and vocabulary: Fair   Insight:  fair   Judgment: fair   Muscle Strength and Tone: Continue medical management   Gait/Station: Unable to assess patient is in bed   Motor Activity: no abnormal movements     Lab Results:    I have personally reviewed all pertinent laboratory/tests results  Labs in last 72 hours:   Recent Labs     12/17/22  1040 12/18/22  0513 12/19/22  0439   WBC 13 56*   < > 5 34   RBC 5 37*   < > 5 12   HGB 10 7*   < > 10 2*   HCT 36 3   < > 36 5   *   < > 331   NEUTROABS 12 18*  --  3 90   SODIUM 128*   < > 138   K 3 6   < > 3 7   CL 94*   < > 107   CO2 22   < > 25   BUN 15   < > 16   CREATININE 0 85   < > 0 59*   GLUC 123   < > 99   CALCIUM 10 1   < > 9 3   AST 29  --   --    ALT 31  --   --    ALKPHOS 110  --   --    TP 8 6*  --   --    ALB 4 1  --   --    TBILI 0 48  --   --    DHF4DDUUWVDO 1 970  --   --     < > = values in this interval not displayed         Code Status: )Level 1 - Full Code    Assessment/Plan     Assessment:  Edythe Leventhal is a 58 y o  female with schizoaffective disorder, hypothyroidism, hypertension who presented to the hospital with changes in mental status  She states she was not able to take her medication for some time because she ran out and was not able to get a refill, today patient is fully awake alert and oriented, she denies any suicidal thoughts plan or intent she does not have any active psychotic symptoms  She is back at baseline  Diagnosis:  Bipolar disorder most recent episode depressed moderate without psychotic feature  Plan:   Continue medical management  Continue current psychotropic medications  She will follow-up with a primary physician upon discharge  No other recommendation at this time  Discussed with the primary team  I will sign off  Risks, benefits and possible side effects of Medications:   Risks, benefits, and possible side effects of medications explained to patient and patient verbalizes understanding             Pamela Quinteros MD

## 2022-12-19 NOTE — PLAN OF CARE
Problem: Potential for Falls  Goal: Patient will remain free of falls  Description: INTERVENTIONS:  - Educate patient/family on patient safety including physical limitations  - Instruct patient to call for assistance with activity   - Consult OT/PT to assist with strengthening/mobility   - Keep Call bell within reach  - Keep bed low and locked with side rails adjusted as appropriate  - Keep care items and personal belongings within reach  - Initiate and maintain comfort rounds  - Make Fall Risk Sign visible to staff  - Offer Toileting every two Hours, in advance of need  - Initiate/Maintain bed/chair alarm  - Obtain necessary fall risk management equipment: bed/chair alarm   - Apply yellow socks and bracelet for high fall risk patients  - Consider moving patient to room near nurses station  Outcome: Progressing     Problem: PAIN - ADULT  Goal: Verbalizes/displays adequate comfort level or baseline comfort level  Description: Interventions:  - Encourage patient to monitor pain and request assistance  - Assess pain using appropriate pain scale  - Administer analgesics based on type and severity of pain and evaluate response  - Implement non-pharmacological measures as appropriate and evaluate response  - Consider cultural and social influences on pain and pain management  - Notify physician/advanced practitioner if interventions unsuccessful or patient reports new pain  Outcome: Progressing     Problem: SAFETY ADULT  Goal: Patient will remain free of falls  Description: INTERVENTIONS:  - Educate patient/family on patient safety including physical limitations  - Instruct patient to call for assistance with activity   - Consult OT/PT to assist with strengthening/mobility   - Keep Call bell within reach  - Keep bed low and locked with side rails adjusted as appropriate  - Keep care items and personal belongings within reach  - Initiate and maintain comfort rounds  - Make Fall Risk Sign visible to staff  - Offer Toileting every two Hours, in advance of need  - Initiate/Maintain bed alarm  - Obtain necessary fall risk management equipment: bed alarm   - Apply yellow socks and bracelet for high fall risk patients  - Consider moving patient to room near nurses station  Outcome: Progressing

## 2022-12-19 NOTE — ASSESSMENT & PLAN NOTE
· Unclear etiology  Likely metabolic encephalopathy  · S/p CTH, CTA head/neck, no acute intracranial source  No identified source of infection   + hyponatremia but chronic and non significantly low  · Will UDS -negative  · Check TSH -1 970  · Check EEG -completed not yet final read  · Appreciate neurology, psychiatry

## 2022-12-20 VITALS
BODY MASS INDEX: 30.41 KG/M2 | HEIGHT: 64 IN | OXYGEN SATURATION: 98 % | WEIGHT: 178.13 LBS | DIASTOLIC BLOOD PRESSURE: 77 MMHG | SYSTOLIC BLOOD PRESSURE: 135 MMHG | HEART RATE: 87 BPM | TEMPERATURE: 97.1 F | RESPIRATION RATE: 16 BRPM

## 2022-12-20 LAB
ATRIAL RATE: 100 BPM
ATRIAL RATE: 61 BPM
P AXIS: 36 DEGREES
P AXIS: 73 DEGREES
PR INTERVAL: 136 MS
PR INTERVAL: 160 MS
QRS AXIS: 11 DEGREES
QRS AXIS: 61 DEGREES
QRSD INTERVAL: 106 MS
QRSD INTERVAL: 76 MS
QT INTERVAL: 350 MS
QT INTERVAL: 406 MS
QTC INTERVAL: 408 MS
QTC INTERVAL: 451 MS
T WAVE AXIS: 1 DEGREES
T WAVE AXIS: 57 DEGREES
VENTRICULAR RATE: 100 BPM
VENTRICULAR RATE: 61 BPM

## 2022-12-20 RX ORDER — LORAZEPAM 1 MG/1
1 TABLET ORAL EVERY 6 HOURS PRN
Qty: 10 TABLET | Refills: 0 | Status: SHIPPED | OUTPATIENT
Start: 2022-12-20 | End: 2022-12-20 | Stop reason: SDUPTHER

## 2022-12-20 RX ORDER — QUETIAPINE FUMARATE 50 MG/1
50 TABLET, EXTENDED RELEASE ORAL
Qty: 30 TABLET | Refills: 0 | Status: SHIPPED | OUTPATIENT
Start: 2022-12-20 | End: 2022-12-30 | Stop reason: SDUPTHER

## 2022-12-20 RX ORDER — LORAZEPAM 1 MG/1
1 TABLET ORAL DAILY PRN
Qty: 10 TABLET | Refills: 0 | Status: SHIPPED | OUTPATIENT
Start: 2022-12-20 | End: 2022-12-30

## 2022-12-20 RX ADMIN — PANTOPRAZOLE SODIUM 40 MG: 40 TABLET, DELAYED RELEASE ORAL at 09:04

## 2022-12-20 RX ADMIN — SUCRALFATE 1 G: 1 TABLET ORAL at 09:04

## 2022-12-20 RX ADMIN — HEPARIN SODIUM 5000 UNITS: 5000 INJECTION INTRAVENOUS; SUBCUTANEOUS at 06:25

## 2022-12-20 RX ADMIN — AMLODIPINE BESYLATE 5 MG: 5 TABLET ORAL at 08:23

## 2022-12-20 RX ADMIN — FERROUS SULFATE TAB 325 MG (65 MG ELEMENTAL FE) 325 MG: 325 (65 FE) TAB at 08:23

## 2022-12-20 RX ADMIN — LEVOTHYROXINE SODIUM 25 MCG: 25 TABLET ORAL at 06:25

## 2022-12-20 RX ADMIN — LORAZEPAM 1 MG: 1 TABLET ORAL at 08:23

## 2022-12-20 RX ADMIN — PAROXETINE HYDROCHLORIDE 60 MG: 20 TABLET, FILM COATED ORAL at 08:23

## 2022-12-20 RX ADMIN — ZIPRASIDONE HYDROCHLORIDE 80 MG: 40 CAPSULE ORAL at 09:51

## 2022-12-20 RX ADMIN — SUCRALFATE 1 G: 1 TABLET ORAL at 12:06

## 2022-12-20 NOTE — CASE MANAGEMENT
Case Management Assessment    Patient name Stephen Caceres /-12 MRN [id number]  : 1960 Date 2022       Current Admission Date: 2022  Current Admission Diagnosis:SIRS (systemic inflammatory response syndrome) [facility])   Patient Active Problem List    Diagnosis Date Noted   • Acute encephalopathy 2022   • Thrombocytosis 2022   • Abdominal pain 2022   • Obesity, Class I, BMI 30-34 9 2022   • Schreiber's esophagus 2022   • Microcytic anemia 2022   • Self neglect 2022   • Pruritus 2022   • Upper GI bleed 01/10/2022   • Nausea and vomiting 10/22/2021   • S/P tooth extraction 10/22/2021   • [facility] 2021   • Hyperlipidemia 2021   • Word finding difficulty 2021   • Hiatal hernia 2021   • Polyp of colon 2021   • Melena 2020   • Cellulitis of left upper extremity 2020   • Erosive esophagitis 2020   • Rash 2020   • Symptomatic anemia 2020   • Multiple excoriations 2020   • Acute non-recurrent maxillary sinusitis 2020   • Fever 2020   • Dizziness 2020   • Fall at home 2020   • Esophagitis 2020   • Hyponatremia 2020   • Problem related to living arrangement 2020   • Hypokalemia    • NMS (neuroleptic malignant syndrome) 2020   • Preop exam for internal medicine 2019   • Iron deficiency anemia due to acute on chronic blood loss 2019   • Chronic bilateral low back pain without sciatica 2019   • Right hip pain 07/15/2019   • Essential hypertension 2019   • Elevated BP without diagnosis of hypertension 2019   • Dermal hypersensitivity reaction 2018   • Psychiatric disorder 2018   • Schizoaffective disorder ([address] ) 2018   • Serotonin syndrome 2018   • Other fatigue 2018   • Spinal stenosis of lumbar region with neurogenic claudication 06/15/2018   • Lumbar spondylosis 06/15/2018   • T12 compression fracture ([address] ) 06/15/2018   • Synovial cyst of lumbar facet joint 06/15/2018   • Localized osteoporosis with current pathological fracture with routine healing 05/25/2018   • Lumbar radiculopathy 03/19/2018   • DDD (degenerative disc disease), lumbar 03/19/2018   • Spondylolisthesis at L4-L5 level 03/19/2018   • Anxiety 10/31/2016   • Acquired hypothyroidism 10/31/2016   • Constipation 04/10/2014   • Bulimia nervosa in remission 03/19/2014      LOS (days): 3  Geometric Mean LOS (GMLOS) (days): 5 10  Days to GMLOS:2 3     OBJECTIVE:    Risk of Unplanned Readmission Score: 22 84         Current admission status: Inpatient       Preferred Pharmacy:   [facility] [address] Fer[address] [place]  [place] [address]  Phone: [phone] Fax: [phone]    Primary Care Provider: Sonja Cary MD    Primary Insurance: [address] [place], [place] [address]  Secondary Insurance: [address] [place] [address]    ASSESSMENT:  Luis Feng [address], [facility] Representative - Mother   Primary Phone: [phone] (Mobile)  Home Phone: [phone]                              Patient Information  Mental Status: Alert  During Assessment patient was accompanied by: Not accompanied during assessment  Assessment information provided by[de-identified] Patient  Primary Caregiver: Self  Support Systems: Parent  Home entry access options  Select all that apply : Stairs  Number of steps to enter home  : 1  Do the steps have railings?: No  Type of Current Residence: [place]  In the last 12 months, was there a time when you were not able to pay the mortgage or rent on time?: No  In the last 12 months, how many places have you lived?: 1  In the last 12 months, was there a time when you did not have a steady place to sleep or slept in a shelter (including now)?: No  Living Arrangements: Lives w/ Parent(s)    Activities of Daily Living Prior to Admission  Functional Status: Independent  Completes ADLs independently?: Yes  Ambulates independently?: Yes  Does patient use assisted devices?: No  Does patient currently own DME?: No  Does patient have a history of Outpatient Therapy (PT/OT)?: No  Does the patient have a history of Short-Term Rehab?: No  Does patient have a history of HHC?: Yes         Patient Information Continued  Within the past 12 months, you worried that your food would run out before you got the money to buy more : Never true  Within the past 12 months, the food you bought just didn't last and you didn't have money to get more : Never true  Food insecurity resource given?: N/A  Does patient receive dialysis treatments?: No  Does patient have a history of substance abuse?: No  Does patient have a history of Mental Health Diagnosis?: Yes  Is patient receiving treatment for mental health?: Yes  Has patient received inpatient treatment related to mental health in the last 2 years?: No         Means of Transportation  Means of Transport to Appts[de-identified] Drives Self  In the past 12 months, has lack of transportation kept you from medical appointments or from getting medications?: No  In the past 12 months, has lack of transportation kept you from meetings, work, or from getting things needed for daily living?: No  Was application for public transport provided?: N/A

## 2022-12-20 NOTE — ASSESSMENT & PLAN NOTE
· Hard discern HPI given AMS  S/p CT a/p no acute pathology  · Pt w/ hx of rosas's esophagus, hiatal hernia s/p fundoplication  Recent EGD in 12/12  Follows with gastroenterology  Appreciate speech initiated diet, continue with dental soft diet

## 2022-12-20 NOTE — ASSESSMENT & PLAN NOTE
· Chronic, possibly secondary to SIADH given on psych meds vs electrolyte loss through vomiting  as she report daily vomiting secondary to hiatal hernia, will monitor  · Improved with IV fluids, IV fluids discontinued on 12/19  · Sodium level improved, continue to monitor BMP

## 2022-12-20 NOTE — CASE MANAGEMENT
Case Management Discharge Planning Note    Patient name Edythe Leventhal  Location /-20 MRN [id number]  : 1960 Date 2022       Current Admission Date: 2022  Current Admission Diagnosis:SIRS (systemic inflammatory response syndrome) [facility])   Patient Active Problem List    Diagnosis Date Noted   • Acute encephalopathy 2022   • Thrombocytosis 2022   • Abdominal pain 2022   • Obesity, Class I, BMI 30-34 9 2022   • Schreiber's esophagus 2022   • Microcytic anemia 2022   • Self neglect 2022   • Pruritus 2022   • Upper GI bleed 01/10/2022   • Nausea and vomiting 10/22/2021   • S/P tooth extraction 10/22/2021   • [facility] 2021   • Hyperlipidemia 2021   • Word finding difficulty 2021   • Hiatal hernia 2021   • Polyp of colon 2021   • Melena 2020   • Cellulitis of left upper extremity 2020   • Erosive esophagitis 2020   • Rash 2020   • Symptomatic anemia 2020   • Multiple excoriations 2020   • Acute non-recurrent maxillary sinusitis 2020   • Fever 2020   • Dizziness 2020   • Fall at home 2020   • Esophagitis 2020   • Hyponatremia 2020   • Problem related to living arrangement 2020   • Hypokalemia    • NMS (neuroleptic malignant syndrome) 2020   • Preop exam for internal medicine 2019   • Iron deficiency anemia due to acute on chronic blood loss 2019   • Chronic bilateral low back pain without sciatica 2019   • Right hip pain 07/15/2019   • Essential hypertension 2019   • Elevated BP without diagnosis of hypertension 2019   • Dermal hypersensitivity reaction 2018   • Psychiatric disorder 2018   • Schizoaffective disorder ([address] ) 2018   • Serotonin syndrome 2018   • Other fatigue 2018   • Spinal stenosis of lumbar region with neurogenic claudication 06/15/2018   • Lumbar spondylosis 06/15/2018   • T12 compression fracture ([address] ) 06/15/2018   • Synovial cyst of lumbar facet joint 06/15/2018   • Localized osteoporosis with current pathological fracture with routine healing 05/25/2018   • Lumbar radiculopathy 03/19/2018   • DDD (degenerative disc disease), lumbar 03/19/2018   • Spondylolisthesis at L4-L5 level 03/19/2018   • Anxiety 10/31/2016   • Acquired hypothyroidism 10/31/2016   • Constipation 04/10/2014   • Bulimia nervosa in remission 03/19/2014      LOS (days): 3  Geometric Mean LOS (GMLOS) (days): 5 10  Days to GMLOS:2 3     OBJECTIVE:  Risk of Unplanned Readmission Score: 22 84         Current admission status: Inpatient   Preferred Pharmacy:   [facility] [address] [address]  Phone: [phone] Fax: [phone]    Primary Care Provider: Dorothy Rosario MD    Primary Insurance: [place] MEDICARE [facility]  Secondary Insurance: [address] [place] [address]    DISCHARGE DETAILS:    Discharge planning discussed with[de-identified] Patient  Freedom of Choice: Yes  Comments - Freedom of Choice: Pt wants to d/c home no needs  CM contacted family/caregiver?: No- see comments (No answer when calling patients mother)  Were Treatment Team discharge recommendations reviewed with patient/caregiver?: Yes  Did patient/caregiver verbalize understanding of patient care needs?: Yes  Were patient/caregiver advised of the risks associated with not following Treatment Team discharge recommendations?: Yes                                Discharge Destination Plan[de-identified] Home

## 2022-12-20 NOTE — ASSESSMENT & PLAN NOTE
· Unclear etiology  Likely metabolic encephalopathy  · S/p CTH, CTA head/neck, no acute intracranial source  No identified source of infection  + hyponatremia but chronic and non significantly low  · Will UDS -negative  · Check TSH -1 970  · EEG unremarkable for any seizure-like activity but did showed diffuse cerebral dysfunction    · Appreciate neurology, psychiatry

## 2022-12-20 NOTE — DISCHARGE SUMMARY
[facility]  Discharge- Maximemenio Vera 1960, 58 y o  female MRN: [id number]  Unit/Bed#: -Shruthi Encounter: [id number]  Primary Care Provider: Zonia Marie MD   Date and time admitted to hospital: 12/17/2022 10:04 AM    Abdominal pain  Assessment & Plan  · Hard discern HPI given AMS  S/p CT a/p no acute pathology  · Pt w/ hx of rosas's esophagus, hiatal hernia s/p fundoplication  Recent EGD in 12/12  Follows with gastroenterology  Appreciate speech initiated diet, continue with dental soft diet  Thrombocytosis  Assessment & Plan  · Unclear etiology, ? Underlying infxn process though unidentified  · Trending down  Acute encephalopathy  Assessment & Plan  · Unclear etiology  Likely metabolic encephalopathy  · S/p CTH, CTA head/neck, no acute intracranial source  No identified source of infection  + hyponatremia but chronic and non significantly low  · Will UDS -negative  · Check TSH -1 970  · EEG unremarkable for any seizure-like activity but did showed diffuse cerebral dysfunction  · Appreciate neurology, psychiatry    Hyponatremia  Assessment & Plan  · Chronic, possibly secondary to SIADH given on psych meds vs electrolyte loss through vomiting  as she report daily vomiting secondary to hiatal hernia, will monitor  · Improved with IV fluids, IV fluids discontinued on 12/19  · Sodium level improved, continue to monitor BMP  Essential hypertension  Assessment & Plan  · Stable, continue home meds       Psychiatric disorder  Assessment & Plan  · Hx of schizophrenia  · Appreciate psych and discussed with psych   · Pt takes Seroquel 400 mg at hs - Dr Dwayne Reed recommends resuming at 50 mg at hs   · Pt reports 2 mg po ativan daily   · However, psych recommend 1 mg ativan daily for now   · Paxil 60 mg po daily continue resumed 12/19  · Geodon 80 mg daily continue resumed 12/19    * SIRS (systemic inflammatory response syndrome) ([facility] [address] )  Assessment & Plan  · Met SIRs criteria with leukocytosis, tachycardia  · Unclear source  · S/p CT imaging no source  UA benign  No meningeal sign on exam  Low procal  · Given IV ctx in the ED  Hold on abx unless should detoriate then will place on broad spectrum abx  · F/u blood cx negative in 48 hrs  · Resolved lactic acidosis        Medical Problems     Resolved Problems  Date Reviewed: 12/20/2022   None       Discharging Physician / Practitioner: Mayra Morgan MD  PCP: Tracy Campos MD  Admission Date:   Admission Orders (From admission, onward)     Ordered        12/17/22 1803  Inpatient Admission  Once            12/17/22 1421  Place in Observation  Once                      Discharge Date: 12/20/22    Consultations During Hospital Stay:  · Neurology  · Behavioral health    Procedures Performed:   · none    Significant Findings / Test Results:   Procedure: EGD    Result Date: 12/12/2022  Narrative: Table formatting from the original result was not included  MARIE Buddy Addison [facility] [address] [phone] DATE OF SERVICE: 12/12/22 PHYSICIAN(S): Attending: Johana Mariscal MD Fellow: No Staff Documented Procedure : EGD with biopsies INDICATION: Ulcer of esophagus with bleeding, Gastroesophageal reflux disease without esophagitis, Schreiber's esophagus without dysplasia, Bilious vomiting with nausea POST-OP DIAGNOSIS: See the impression below  PREPROCEDURE: Informed consent was obtained for the procedure, including sedation  Risks of perforation, hemorrhage, adverse drug reaction and aspiration were discussed  The patient was placed in the left lateral decubitus position  Patient was explained about the risks and benefits of the procedure  Risks including but not limited to bleeding, infection, and perforation were explained in detail  Also explained about less than 100% sensitivity with the exam and other alternatives   DETAILS OF PROCEDURE: Patient was taken to the procedure room where a time out was performed to confirm correct patient and correct procedure  The patient underwent monitored anesthesia care, which was administered by an anesthesia professional  The patient's blood pressure, heart rate, level of consciousness, respirations and oxygen were monitored throughout the procedure  The scope was advanced to the second part of the duodenum  Retroflexion was performed in the fundus  Prior to the procedure, the patient's H  Pylori status was negative  The patient experienced no blood loss  The procedure was not difficult  The patient tolerated the procedure well  There were no apparent complications  ANESTHESIA INFORMATION: ASA: II Anesthesia Type: Anesthesia type not filed in the log  MEDICATIONS: No administrations occurring from 0954 to 1007 on 12/12/22 FINDINGS: Mild, localized erythematous, friable and salmon-colored mucosa in the lower third of the esophagus, consistent with Schreiber's esophagus; performed 4 cold forceps biopsies to rule out Schreiber's esophagus  There is evidence of reflux esophagitis with questionable Schreiber's esophagus, lower esophageal mucosa appeared erythematous and friable secondary to chronic vomiting and reflux disease, multiple biopsies were done 3 cm sliding hiatal hernia (type I hiatal hernia) without Karolynn Vizcaino lesions present  Recurrence of hiatal hernia, history of TIF The fundus of the stomach, body of the stomach, antrum, duodenal bulb, 1st part of the duodenum and 2nd part of the duodenum appeared normal  SPECIMENS: ID Type Source Tests Collected by Time Destination 1 : lower esoph reflux Tissue Esophagus TISSUE EXAM Sonia Iraheta MD 12/12/2022 10:04 AM      Impression: 1  Recurrence of hiatal hernia less 3 cm in size, history of TIF 2   LA grade B reflux esophagitis with possible Schreiber's esophagus 3  Normal stomach and duodenum 4  Previously seen lower esophageal ulcer well-healed RECOMMENDATION:  Await pathology results    Pantoprazole 40 mg p o  twice a day, liquid Carafate 1 g 3 times a day    Zofran 4 mg po q 6 hourly    Schedule  for gastric emptying study      Nicole Jenkins MD     Procedure: MRI foot/forefoot toes left wo contrast    Result Date: 11/22/2022  Narrative: MRI LEFT FOOT INDICATION:   L08 9: Local infection of the skin and subcutaneous tissue, unspecified M20 42: Other hammer toe(s) (acquired), left foot  COMPARISON: Correlation is made with the prior radiograph dated 9/15/2022  TECHNIQUE:  Multiplanar/multisequence MR of the left foot was performed  Imaging performed on 1 5T MRI FINDINGS: SUBCUTANEOUS TISSUES: There is ulceration along the tip of the distal 4th toe  No discrete abscess  BONES:  Status post amputation of the 5th toe phalanges  There is magnetic susceptibility artifact arising from 2 screws transfixing the proximal 1st metatarsal shaft  There is moderate hallux valgus and hammertoe deformities  There is marrow edema and with mild confluent decreased T1 marrow signal within the 4th toe distal phalanx suspicious for osteomyelitis  There is also confluent decreased T1 marrow signal seen within the 4th middle phalanx suggestive of osteomyelitis  FIRST MTP JOINT:  As mentioned above  Mild osteoarthrosis  SESAMOID BONES:  Intact  OTHER ARTICULAR SURFACE:  There is calcaneonavicular fibrous coalition  PLANTAR FASCIA:  Intact  LISFRANC LIGAMENT:  Intact  FOREFOOT TENDONS: Intact  INTERMETATARSAL REGIONS: No bursitis or Mayfield's neuroma  MUSCULATURE: Prominent fatty atrophy of the tarsal musculature  Impression: Soft tissue ulceration tip of the distal 4th toe  Osteomyelitis of the 4th toe distal phalanx and middle phalanx  Calcaneonavicular fibrous coalition  Additional findings as above  The study was marked in EPIC for significant notification   Workstation performed: [id number]   ·   ·         Test Results Pending at Discharge (will require follow up):   · No     Outpatient Tests Requested:  · No    Complications:  No    Reason for Admission: Encephalopathy    Hospital Course:   Damien Claderón is a 58 y o  female patient who originally presented to the hospital on 12/17/2022 due to encephalopathy  Apparently patient was brought in by EMS for AMS, etiology unclear  She had extensive work-up including CT head, EEG that was unremarkable for any intracranial pathology  She presented with SIRS alert but no source of infection, CT chest, UA unremarkable for any infectious source  Patient is on multiple psych medications at home given history of schizophrenia, was seen by behavioral health and patient went changes in her regimen were made  Patient's Seroquel decreased to 50 mg, Ativan decreased from 2 mg to 1 mg on discharge  Patient had improvement in her mental status  She had Ebrnardo's catheter placed, however passed voiding trial this morning  She will need to follow-up with PCP, GI, podiatry, psychiatry in outpatient setting  Please see above list of diagnoses and related plan for additional information  Condition at Discharge: stable    Discharge Day Visit / Exam:   Subjective: Patient seen and examined at bedside, reports feeling much better this morning  Vitals: Blood Pressure: 135/76 (12/20/22 1009)  Pulse: 92 (12/20/22 1009)  Temperature: 98 4 °F (36 9 °C) (12/20/22 1009)  Temp Source: Oral (12/17/22 1011)  Respirations: 16 (12/20/22 1009)  Height: 5' 4" (162 6 cm) (12/17/22 1350)  Weight - Scale: 80 8 kg (178 lb 2 1 oz) (12/17/22 1350)  SpO2: 96 % (12/20/22 1009)  Exam:   Physical Exam  Constitutional:       Appearance: Normal appearance  HENT:      Head: Normocephalic and atraumatic  Mouth/Throat:      Mouth: Mucous membranes are moist       Pharynx: Oropharynx is clear  Eyes:      Conjunctiva/sclera: Conjunctivae normal       Pupils: Pupils are equal, round, and reactive to light     Cardiovascular:      Rate and Rhythm: Normal rate and regular rhythm  Pulmonary:      Effort: Pulmonary effort is normal       Breath sounds: Normal breath sounds  Abdominal:      General: Abdomen is flat  Bowel sounds are normal    Musculoskeletal:      Cervical back: Normal range of motion and neck supple  Skin:     General: Skin is warm and dry  Neurological:      General: No focal deficit present  Mental Status: She is alert and oriented to person, place, and time  Comments: Patient has flat affect, speech normal, follows commands, motor intact, sensory intact  Discussion with Family: Updated patient at bedside  Alba Rai Discharge instructions/Information to patient and family:   See after visit summary for information provided to patient and family  Provisions for Follow-Up Care:  See after visit summary for information related to follow-up care and any pertinent home health orders  Disposition:   Home    Planned Readmission: No     Discharge Statement:  I spent 40 minutes discharging the patient  This time was spent on the day of discharge  I had direct contact with the patient on the day of discharge  Greater than 50% of the total time was spent examining patient, answering all patient questions, arranging and discussing plan of care with patient as well as directly providing post-discharge instructions  Additional time then spent on discharge activities  Discharge Medications:  See after visit summary for reconciled discharge medications provided to patient and/or family        **Please Note: This note may have been constructed using a voice recognition system**

## 2022-12-20 NOTE — ASSESSMENT & PLAN NOTE
· Hx of schizophrenia  · Appreciate psych and discussed with psych   · Pt takes Seroquel 400 mg at hs - Dr Paula Guerrero recommends resuming at 50 mg at hs   · Pt reports 2 mg po ativan daily   · However, psych recommend 1 mg ativan daily for now   · Paxil 60 mg po daily continue resumed 12/19  · Geodon 80 mg daily continue resumed 12/19

## 2022-12-21 ENCOUNTER — TELEPHONE (OUTPATIENT)
Dept: INTERNAL MEDICINE CLINIC | Facility: CLINIC | Age: 62
End: 2022-12-21

## 2022-12-21 ENCOUNTER — TRANSITIONAL CARE MANAGEMENT (OUTPATIENT)
Dept: INTERNAL MEDICINE CLINIC | Facility: CLINIC | Age: 62
End: 2022-12-21

## 2022-12-21 NOTE — UTILIZATION REVIEW
NOTIFICATION OF ADMISSION DISCHARGE   This is a Notification of Discharge from [address]  Please be advised that this patient has been discharge from our facility  Below you will find the admission and discharge date and time including the patient’s disposition  UTILIZATION REVIEW CONTACT:  Kyle Bar  Utilization   Network Utilization Review Department  Phone: [phone] x carefully listen to the prompts  All voicemails are confidential   Email: [email or web address]     ADMISSION INFORMATION  PRESENTATION DATE: 12/17/2022 10:04 AM  OBERVATION ADMISSION DATE:   INPATIENT ADMISSION DATE: 12/17/22  6:03 PM   DISCHARGE DATE: 12/20/2022  6:47 PM   DISPOSITION:Home/Self Care    IMPORTANT INFORMATION:  Send all requests for admission clinical reviews, approved or denied determinations and any other requests to dedicated fax number below belonging to the campus where the patient is receiving treatment   List of dedicated fax numbers:  [address] DENIALS (Administrative/Medical Necessity) [phone]   [address] (Maternity/NICU/Pediatrics) [phone]   [facility] [phone]   [facility] 87 [phone]   [address] [phone]   [address] [facility] [phone]   [address] [place] [address] [phone]   [address] [place], [place] [address] [phone]   [facility]  [phone]   [facility] [phone]   412 [facility] [address] [facility] [phone]

## 2022-12-21 NOTE — TELEPHONE ENCOUNTER
The patient was in the ER yesterday  She was disorientated/couldn't talk/confused/stomach ache  She is scheduled for a virtual tomorrow  The clinical staff is aware

## 2022-12-22 ENCOUNTER — TELEMEDICINE (OUTPATIENT)
Dept: INTERNAL MEDICINE CLINIC | Facility: CLINIC | Age: 62
End: 2022-12-22

## 2022-12-22 DIAGNOSIS — G93.40 ACUTE ENCEPHALOPATHY: Primary | ICD-10-CM

## 2022-12-22 DIAGNOSIS — F25.1 SCHIZOAFFECTIVE DISORDER, DEPRESSIVE TYPE (HCC): ICD-10-CM

## 2022-12-22 LAB
BACTERIA BLD CULT: NORMAL
BACTERIA BLD CULT: NORMAL

## 2022-12-22 NOTE — PROGRESS NOTES
Virtual TCM Visit:  I tried calling patient's several times on all the phone numbers listed for her, on the 1 number I was able to leave messages, on the other number voicemail is not set up  When I was able to, I left messages for her to call back so her TCM appointment can be done  I kept calling back over and over to see if one of the times she would  the phone  I finally got through by calling her mother  Verification of patient location:    Patient is located in the following state in which I hold an active license [place]    Assessment/Plan:        Problem List Items Addressed This Visit        Nervous and Auditory    Acute encephalopathy - Primary     Approved, oriented, speech is fluent, behavior normal            Other    Schizoaffective disorder ([facility] [address] )     Continue meds and follow-up psychiatry and psychology             Reason for visit is TCM    Encounter provider Kalpana Pierre MD     Provider located at [address] [place] [phone]    Recent Visits  Date Type Provider Dept   12/21/22 Telephone Kalpana Pierre MD [facility] recent visits within past 7 days and meeting all other requirements  Today's Visits  Date Type Provider Dept   12/22/22 Telemedicine Kalpana Pierre MD [address] [facility] today's visits and meeting all other requirements  Future Appointments  No visits were found meeting these conditions  Showing future appointments within next 150 days and meeting all other requirements       After connecting through [employer], the patient was identified by name and date of birth  Piedad King was informed that this is a telemedicine visit and that the visit is being conducted through other and patient was informed that this is not a secure platform She agrees to proceed     My office door was closed  No one else was in the room    She acknowledged consent and understanding of privacy and security of the video platform  The patient has agreed to participate and understands they can discontinue the visit at any time  Patient is aware this is a billable service  Transitional Care Management Review:  Andrés Vera is a 58 y o  female here for TCM follow up  During the TCM phone call patient stated:    TCM Call     Date and time call was made  12/21/2022 11:26 AM    Hospital care reviewed  Records not available    Patient was hospitial[facility] at  [facility]    Date of Admission  12/17/22    Date of discharge  12/20/22    Diagnosis  SIRS (systemic inflammatory response syndrome) ([facility] [address] )    Disposition  Home    Were the patients medications reviewed and updated  No    Current Symptoms  Fatigue    Dizziness severity  Mild    Neausea severity  Moderate    Fatigue severity  Severe    Quality Character  Lightheadedness    Episode pattern  Intermittent      TCM Call     Post hospital issues  None    Should patient be enrolled in anticoag monitoring? No    Scheduled for follow up? Yes    Not clinically warranted  Patient readmitted      Patients specialists  Other (comment)    Other specialists names  Tiffany Guardian    Did you obtain your prescribed medications  No    Why were you unable to obtain your medications  need psych appt they will not get her in sooner than next week    Do you need help managing your prescriptions or medications  No    Is transportation to your appointment needed  No    I have advised the patient to call PCP with any new or worsening symptoms  Mali Colón - MR/MA    Are you recieving any outpatient services  No    Are you recieving home care services  No    Types of home care services  Nurse visit    Are you using any community resources  No    Current waiver services  No    Have you fallen in the last 12 months  Yes    How many times  3-4 TIMES A DAY    Interperter language line needed  No    Counseling  Patient        Subjective:     Patient ID: Geetha Sandoval is a 58 y o  female  I reviewed patient's hospitalization for altered mental status  Reviewed medication changes by psychiatry  Review of Systems   Constitutional: Negative for chills, fatigue and fever  HENT: Negative for congestion, nosebleeds, postnasal drip, sore throat and trouble swallowing  Eyes: Negative for pain  Respiratory: Negative for cough, chest tightness, shortness of breath and wheezing  Cardiovascular: Negative for chest pain, palpitations and leg swelling  Gastrointestinal: Negative for abdominal pain, constipation, diarrhea, nausea and vomiting  Endocrine: Negative for polydipsia and polyuria  Genitourinary: Negative for dysuria, flank pain and hematuria  Musculoskeletal: Negative for arthralgias  Skin: Negative for rash  Neurological: Negative for dizziness, tremors, light-headedness and headaches  Hematological: Does not bruise/bleed easily  Psychiatric/Behavioral: Negative for confusion and dysphoric mood  The patient is not nervous/anxious  TCM Call     Date and time call was made  12/21/2022 11:26 AM    Hospital care reviewed  Records not available    Patient was hospitialized at  [facility]    Date of Admission  12/17/22    Date of discharge  12/20/22    Diagnosis  SIRS (systemic inflammatory response syndrome) ([facility] )    Disposition  Home    Were the patients medications reviewed and updated  No    Current Symptoms  Fatigue    Dizziness severity  Mild    Neausea severity  Moderate    Fatigue severity  Severe    Quality Character  Lightheadedness    Episode pattern  Intermittent      TCM Call     Post hospital issues  None    Should patient be enrolled in anticoag monitoring? No    Scheduled for follow up? Yes    Not clinically warranted  Patient readmitted      Patients specialists  Other (comment)    Other specialists names  Scar Mcneill    Did you obtain your prescribed medications  No    Why were you unable to obtain your medications  need psych appt they will not get her in sooner than next week    Do you need help managing your prescriptions or medications  No    Is transportation to your appointment needed  No    I have advised the patient to call PCP with any new or worsening symptoms  Sharon Carreno - MR/MA    Are you recieving any outpatient services  No    Are you recieving home care services  No    Types of home care services  Nurse visit    Are you using any community resources  No    Current waiver services  No    Have you fallen in the last 12 months  Yes    How many times  3-4 TIMES A DAY    Interperter language line needed  No    Counseling  Patient        Objective: There were no vitals filed for this visit  Physical Exam  Constitutional:       General: She is not in acute distress  Pulmonary:      Effort: Pulmonary effort is normal  No respiratory distress  Neurological:      Mental Status: She is alert and oriented to person, place, and time  Psychiatric:         Thought Content: Thought content normal          Judgment: Judgment normal          Medications have been reviewed by provider in current encounter    I spent 20 minutes with the patient during this visit  Antolin Alba MD      VIRTUAL VISIT DISCLAIMER    Stephen Gallagher verbally agrees to participate in [facility]  Pt is aware that [facility] could be limited without vital signs or the ability to perform a full hands-on physical exam  Irene Plascencia understands she or the provider may request at any time to terminate the video visit and request the patient to seek care or treatment in person

## 2022-12-28 ENCOUNTER — TELEPHONE (OUTPATIENT)
Dept: PODIATRY | Facility: CLINIC | Age: 62
End: 2022-12-28

## 2022-12-28 NOTE — TELEPHONE ENCOUNTER
Caller: Devyn Winter    Doctor: Dylan Fuller    Reason for call: calling to reschedule her SX - was admitted last week    Call back#: [phone]

## 2022-12-30 ENCOUNTER — TELEPHONE (OUTPATIENT)
Dept: INTERNAL MEDICINE CLINIC | Facility: CLINIC | Age: 62
End: 2022-12-30

## 2022-12-30 DIAGNOSIS — F25.1 SCHIZOAFFECTIVE DISORDER, DEPRESSIVE TYPE (HCC): ICD-10-CM

## 2022-12-30 DIAGNOSIS — F99 PSYCHIATRIC DISORDER: ICD-10-CM

## 2022-12-30 RX ORDER — QUETIAPINE FUMARATE 50 MG/1
50 TABLET, EXTENDED RELEASE ORAL
Qty: 30 TABLET | Refills: 5 | Status: SHIPPED | OUTPATIENT
Start: 2022-12-30 | End: 2023-01-04 | Stop reason: SDUPTHER

## 2022-12-30 NOTE — TELEPHONE ENCOUNTER
Pt calling in requesting a refill on quetiapine, I advised a script for 50mg was just sent to her pharmacy 12/20 however pt states she put her self back on 400mg daily without asking any doctor or  Approval  not sure that pt should receive refill of 400mg per recent hospitalization Dr Stevie Tan recommends she do 50mg nightly (see note)

## 2022-12-30 NOTE — TELEPHONE ENCOUNTER
Spoke to pt she is asking for a refill as she can't get an appt with psychiatry for couple months and her therapist is unable to prescribe any medications  Reviewed chart and verified notes from ER visit  Advised pt that per her ER visit they wanted her to continue 50 mg not the 400 she was taking previously  Pt verbalized understanding      [facility]

## 2023-01-04 ENCOUNTER — OFFICE VISIT (OUTPATIENT)
Dept: INTERNAL MEDICINE CLINIC | Facility: CLINIC | Age: 63
End: 2023-01-04

## 2023-01-04 VITALS
BODY MASS INDEX: 30.05 KG/M2 | SYSTOLIC BLOOD PRESSURE: 138 MMHG | HEIGHT: 64 IN | OXYGEN SATURATION: 99 % | DIASTOLIC BLOOD PRESSURE: 84 MMHG | WEIGHT: 176 LBS | HEART RATE: 88 BPM

## 2023-01-04 DIAGNOSIS — N39.3 STRESS INCONTINENCE: ICD-10-CM

## 2023-01-04 DIAGNOSIS — F99 PSYCHIATRIC DISORDER: ICD-10-CM

## 2023-01-04 DIAGNOSIS — Z59.9 FINANCIAL DIFFICULTIES: ICD-10-CM

## 2023-01-04 DIAGNOSIS — G93.40 ACUTE ENCEPHALOPATHY: Primary | ICD-10-CM

## 2023-01-04 DIAGNOSIS — L97.529: ICD-10-CM

## 2023-01-04 DIAGNOSIS — Z00.00 MEDICARE ANNUAL WELLNESS VISIT, SUBSEQUENT: ICD-10-CM

## 2023-01-04 DIAGNOSIS — F25.1 SCHIZOAFFECTIVE DISORDER, DEPRESSIVE TYPE (HCC): ICD-10-CM

## 2023-01-04 DIAGNOSIS — Z12.31 SCREENING MAMMOGRAM, ENCOUNTER FOR: ICD-10-CM

## 2023-01-04 DIAGNOSIS — M06.9 RHEUMATOID ARTHRITIS INVOLVING BOTH FEET, UNSPECIFIED WHETHER RHEUMATOID FACTOR PRESENT (HCC): ICD-10-CM

## 2023-01-04 RX ORDER — QUETIAPINE 150 MG/1
150 TABLET, FILM COATED, EXTENDED RELEASE ORAL
Qty: 30 TABLET | Refills: 5 | Status: SHIPPED | OUTPATIENT
Start: 2023-01-04 | End: 2023-02-03

## 2023-01-04 SDOH — ECONOMIC STABILITY - INCOME SECURITY: PROBLEM RELATED TO HOUSING AND ECONOMIC CIRCUMSTANCES, UNSPECIFIED: Z59.9

## 2023-01-04 NOTE — PROGRESS NOTES
Assessment and Plan:     Problem List Items Addressed This Visit        Nervous and Auditory    Acute encephalopathy - Primary     Improved on lower doses of medications, but pt asking to have them increased back to the previous levels  I recommend follow up psychiatry for med adjustments  Since Seroquel was decreased from 400 mg at bedtime to 50 mg at bedtime, will slightly increase Seroquel to 100 mg at bedtime while pt awaiting eval with psych  Since neck size dose is 150 mg, will increase to 150 mg            Musculoskeletal and Integument    Rheumatoid arthritis involving both feet, unspecified whether rheumatoid factor present ([facility] [address] )     Follow up podiatry            Other    Schizoaffective disorder ([facility] [address] )     Follow up psych         Relevant Medications    QUEtiapine (SEROquel XR) 150 mg 24 hr tablet    Other Relevant Orders    Ambulatory Referral to Psychiatry    Psychiatric disorder    Relevant Medications    QUEtiapine (SEROquel XR) 150 mg 24 hr tablet    Medicare annual wellness visit, subsequent     Discussed preventative health, cancer screening, immunizations, and safety issues  I recommend mammogram   She reports she had the flu shot already    I recommend getting the Shingrix shot to help prevent Shingles  You can get it a pharmacy, and they can administer it there  It is a two shot series with the second shot needed between 2-6 months after the first shot  I would not recommend getting the shot before an important or fun event in case you were to have a reaction to the shot like a sore arm or flu-like symptoms  I make the same recommendation about any shot, as people can have a reaction to any shot           Stress incontinence     Can try Kegel exercises         Ulcer of toe, chronic, left, with unspecified severity [facility])     Follow-up podiatry        Other Visit Diagnoses     Financial difficulties        Relevant Orders    Ambulatory referral to social work care management program Screening mammogram, encounter for        Relevant Orders    Mammo screening bilateral w cad        BMI Counseling: Body mass index is 30 21 kg/m²  The BMI is above normal  Nutrition recommendations include encouraging healthy choices of fruits and vegetables and moderation in carbohydrate intake  Exercise recommendations include exercising 3-5 times per week  Rationale for BMI follow-up plan is due to patient being overweight or obese  Preventive health issues were discussed with patient, and age appropriate screening tests were ordered as noted in patient's After Visit Summary  Personalized health advice and appropriate referrals for health education or preventive services given if needed, as noted in patient's After Visit Summary  History of Present Illness:     Patient presents for a Medicare Wellness Visit    Pt concerned about psychiatry lowering seroquel from 400 mg at bedtime to 50 mg at bedtime  Since on the lower dose of Seroquel she reports difficulty sleeping  She denies having problems on the higher dose of Seroquel, but she was admitted to hospital with encephalopathy and seroquel was currently decreased from 400 mg at bedtime to 50 mg at bedtime, and Ativan was decreased from 2mg 1-2 times a day to once daily prn  Patient Care Team:  Kiel Fair MD as PCP - General (Internal Medicine)  MD Elvis Charles CRNP as Nurse Practitioner (Pain Medicine)  Selina Dominguez DO (Anesthesiology)     Review of Systems:     Review of Systems   Constitutional: Negative for chills, fatigue and fever  HENT: Negative for congestion, nosebleeds, postnasal drip, sore throat and trouble swallowing  Eyes: Negative for pain  Respiratory: Negative for cough, chest tightness, shortness of breath and wheezing  Cardiovascular: Negative for chest pain, palpitations and leg swelling  Gastrointestinal: Positive for abdominal pain (intermittent)   Negative for constipation, diarrhea, nausea and vomiting  Endocrine: Negative for polydipsia and polyuria  Genitourinary: Negative for dysuria, flank pain and hematuria  Musculoskeletal: Negative for arthralgias  Skin: Negative for rash  Neurological: Negative for dizziness, tremors and headaches  Hematological: Does not bruise/bleed easily  Psychiatric/Behavioral: Positive for sleep disturbance  Negative for confusion and dysphoric mood  The patient is not nervous/anxious           Problem List:     Patient Active Problem List   Diagnosis   • Lumbar radiculopathy   • DDD (degenerative disc disease), lumbar   • Spondylolisthesis at L4-L5 level   • Localized osteoporosis with current pathological fracture with routine healing   • Spinal stenosis of lumbar region with neurogenic claudication   • Lumbar spondylosis   • T12 compression fracture ([facility])   • Synovial cyst of lumbar facet joint   • Anxiety   • Bulimia nervosa in remission   • Constipation   • Acquired hypothyroidism   • Other fatigue   • Serotonin syndrome   • Schizoaffective disorder (HCC)   • Psychiatric disorder   • Dermal hypersensitivity reaction   • Elevated BP without diagnosis of hypertension   • Essential hypertension   • Right hip pain   • Chronic bilateral low back pain without sciatica   • Iron deficiency anemia due to acute on chronic blood loss   • Preop exam for internal medicine   • NMS (neuroleptic malignant syndrome)   • Hypokalemia   • Fall at home   • Esophagitis   • Hyponatremia   • Problem related to living arrangement   • Dizziness   • Medicare annual wellness visit, subsequent   • Fever   • Acute non-recurrent maxillary sinusitis   • Symptomatic anemia   • Multiple excoriations   • Rash   • Cellulitis of left upper extremity   • Erosive esophagitis   • Melena   • Hiatal hernia   • Polyp of colon   • Word finding difficulty   • SIRS (systemic inflammatory response syndrome) ([facility])   • Hyperlipidemia   • Nausea and vomiting   • S/P tooth extraction   • Upper GI bleed   • Pruritus   • Self neglect   • Microcytic anemia   • Schreiber's esophagus   • Obesity, Class I, BMI 30-34 9   • Acute encephalopathy   • Thrombocytosis   • Abdominal pain   • Stress incontinence   • Ulcer of toe, chronic, left, with unspecified severity (HCC)   • Rheumatoid arthritis involving both feet, unspecified whether rheumatoid factor present [facility])      Past Medical and Surgical History:     Past Medical History:   Diagnosis Date   • Anxiety    • Arthritis    • Back pain at L4-L5 level    • Schreiber esophagus    • Bulimia    • Colon polyp    • Disease of thyroid gland     hypothyroid   • Ear problems    • GERD (gastroesophageal reflux disease)    • History of transfusion    • Hyperlipidemia    • Hypertension    • Hypothyroidism    • Leukopenia    • Nasal congestion    • NMS (neuroleptic malignant syndrome) 01/03/2020   • Pneumonia    • Rheumatoid arthritis involving right hip ([facility])    • Sciatica    • Seizure ([address] )     due to medication mix up 8/2018 only one historically   • Seizures ([facility])    • Synovial cyst of lumbar spine    • Vertigo    • Weight gain      Past Surgical History:   Procedure Laterality Date   • BONE MARROW BIOPSY     • BREAST SURGERY      enlargement with implants   • CLOSED REDUCTION DISTAL FEMUR FRACTURE     • COLONOSCOPY     • COSMETIC SURGERY     • DENTAL SURGERY     • HIP ARTHROPLASTY Right 1/2/2020    Procedure: REVISION TOTAL HIP ARTHROPLASTY WITH REPAIR OF PARIPROSTHETIC FRACTURE - POSTERIOR APPROACH;  Surgeon: Vince Benítez MD;  [facility];  Service: Orthopedics   • CO ARTHRP ACETBLR/PROX FEM PROSTC AGRFT/ALGRFT Right 12/11/2019    Procedure: ARTHROPLASTY HIP TOTAL ANTERIOR;  Surgeon: Vince Benítez MD;  [facility];  Service: Orthopedics   • CO ESOPHAGOGASTRODUODENOSCOPY TRANSORAL DIAGNOSTIC N/A 5/11/2021    Procedure: ESOPHAGOGASTRODUODENOSCOPY (EGD); Surgeon:  Cain Sykes MD;  [facility];  Service: General   • CO LAPS RPR PARAESPHGL HRNA INCL FUNDPLSTY W/MESH N/A 5/11/2021    Procedure: REPAIR HERNIA PARAESOPHAGEAL  LAPAROSCOPIC;  Surgeon: Daniele Melissa MD;  [facility];  Service: General   • NH UNLISTED PROCEDURE ESOPHAGUS N/A 5/11/2021    Procedure: FUNDOPLICATION TRANSORAL INCISIONLESS;  Surgeon: Trinity Cannon MD;  [facility];  Service: Gastroenterology   • RHINOPLASTY     • SINUS SURGERY     • TRANSORAL INCISIONLESS FUNDOPLICATION (TIF)  03/72/1325      Family History:     Family History   Problem Relation Age of Onset   • Uterine cancer Mother    • Esophageal cancer Father    • Colon cancer Maternal Grandmother       Social History:     Social History     Socioeconomic History   • Marital status: Single     Spouse name: None   • Number of children: None   • Years of education: None   • Highest education level: None   Occupational History   • None   Tobacco Use   • Smoking status: Never   • Smokeless tobacco: Never   Vaping Use   • Vaping Use: Never used   Substance and Sexual Activity   • Alcohol use: Not Currently   • Drug use: Not Currently   • Sexual activity: Not Currently   Other Topics Concern   • None   Social History Narrative    Family disruption death of family member parent, per allscripts     Social Determinants of Health     Financial Resource Strain: Medium Risk   • Difficulty of Paying Living Expenses: Somewhat hard   Food Insecurity: No Food Insecurity   • Worried About Running Out of Food in the Last Year: Never true   • Ran Out of Food in the Last Year: Never true   Transportation Needs: No Transportation Needs   • Lack of Transportation (Medical): No   • Lack of Transportation (Non-Medical):  No   Physical Activity: Not on file   Stress: Not on file   Social Connections: Not on file   Intimate Partner Violence: Not on file   Housing Stability: Low Risk    • Unable to Pay for Housing in the Last Year: No   • Number of Places Lived in the Last Year: 1   • Unstable Housing in the Last Year: No Medications and Allergies:     Current Outpatient Medications   Medication Sig Dispense Refill   • amLODIPine (NORVASC) 5 mg tablet TAKE 1 TABLET (5 MG TOTAL) BY MOUTH DAILY  90 tablet 1   • ferrous sulfate 325 (65 Fe) mg tablet Take 1 tablet (325 mg total) by mouth 2 (two) times a day with meals 60 tablet 0   • levothyroxine 25 mcg tablet TAKE 1 TABLET BY MOUTH EVERY DAY 90 tablet 3   • ondansetron (ZOFRAN) 4 mg tablet Take 1 tablet (4 mg total) by mouth every 8 (eight) hours as needed for nausea or vomiting 20 tablet 5   • pantoprazole (PROTONIX) 40 mg tablet Take 1 tablet (40 mg total) by mouth 2 (two) times a day 60 tablet 5   • PARoxetine (PAXIL) 30 mg tablet Take 2 tablets (60mg total) by mouth daily 180 tablet 3   • prochlorperazine (COMPAZINE) 10 mg tablet Take 1 tablet (10 mg total) by mouth every 6 (six) hours as needed for nausea or vomiting 30 tablet 0   • QUEtiapine (SEROquel XR) 150 mg 24 hr tablet Take 1 tablet (150 mg total) by mouth daily at bedtime 30 tablet 5   • sucralfate (CARAFATE) 1 g tablet TAKE 1 TABLET (1 G TOTAL) BY MOUTH 3 (THREE) TIMES A DAY WITH MEALS 90 tablet 3   • triamcinolone (KENALOG) 0 1 % cream For acute flares, apply topically twice daily for up to 2 weeks at a time and then take one week off  DO NOT use on the face, armpits, and groin area  When not flaring, can use 1-2 times weekly on areas prone to rash  (Patient taking differently: if needed For acute flares, apply topically twice daily for up to 2 weeks at a time and then take one week off  DO NOT use on the face, armpits, and groin area  When not flaring, can use 1-2 times weekly on areas prone to rash ) 80 g 3   • ziprasidone (GEODON) 80 mg capsule TAKE 1 CAPSULE BY MOUTH EVERY DAY 90 capsule 3   • LORazepam (ATIVAN) 1 mg tablet Take 1 tablet (1 mg total) by mouth daily as needed for anxiety for up to 10 days 10 tablet 0     No current facility-administered medications for this visit       Allergies   Allergen Reactions • Risperdal [Risperidone] Shortness Of Breath     Rapid heart beat, SOB   • Zyprexa [Olanzapine] Shortness Of Breath     Rapid heartbeat   • Latex Rash     Band aids, adhesives wears normal underwear, can eat bananas  USE PAPER TAPE      Immunizations:     Immunization History   Administered Date(s) Administered   • COVID-19 PFIZER VACCINE 0 3 ML IM 03/20/2021, 04/10/2021, 10/09/2021   • H1N1, All Formulations 11/17/2009   • INFLUENZA 11/17/2009, 01/04/2013, 11/11/2014, 10/20/2015, 10/31/2016, 09/16/2018   • Influenza Injectable, MDCK, Preservative Free, Quadrivalent, 0 5 mL 09/26/2019   • Influenza Quadrivalent, 6-35 Months IM 10/31/2016   • Influenza, injectable, quadrivalent, preservative free 0 5 mL 09/05/2020   • Influenza, recombinant, quadrivalent,injectable, preservative free 11/04/2021   • Pneumococcal Conjugate 13-Valent 09/16/2018   • Rabies 07/29/2014, 08/01/2014, 08/05/2014   • Tdap 07/29/2014, 12/17/2022      Health Maintenance:         Topic Date Due   • HIV Screening  Never done   • Cervical Cancer Screening  Never done   • Breast Cancer Screening: Mammogram  Never done   • Colorectal Cancer Screening  09/24/2025   • Hepatitis C Screening  Completed         Topic Date Due   • Hepatitis B Vaccine (1 of 3 - 3-dose series) Never done   • COVID-19 Vaccine (4 - Booster for Pfizer series) 12/04/2021   • Influenza Vaccine (1) 09/01/2022      Medicare Screening Tests and Risk Assessments:     Tiffany is here for her Subsequent Wellness visit  Health Risk Assessment:   Patient rates overall health as poor  Patient feels that their physical health rating is slightly worse  Patient is dissatisfied with their life  Eyesight was rated as same  Hearing was rated as same  Patient feels that their emotional and mental health rating is much worse  Patients states they are sometimes angry  Patient states they are sometimes unusually tired/fatigued  Pain experienced in the last 7 days has been some   Patient's pain rating has been 4/10  Patient states that she has experienced no weight loss or gain in last 6 months  Fall Risk Screening: In the past year, patient has experienced: no history of falling in past year      Urinary Incontinence Screening:   Patient has leaked urine accidently in the last six months  Home Safety:  Patient does not have trouble with stairs inside or outside of their home  Patient has working smoke alarms Home safety hazards include: none  Nutrition:   Current diet is Regular  Medications:   Patient is not currently taking any over-the-counter supplements  Patient is able to manage medications  Activities of Daily Living (ADLs)/Instrumental Activities of Daily Living (IADLs):   Walk and transfer into and out of bed and chair?: Yes  Dress and groom yourself?: Yes    Bathe or shower yourself?: Yes    Feed yourself?  Yes  Do your laundry/housekeeping?: Yes  Manage your money, pay your bills and track your expenses?: Yes  Make your own meals?: Yes    Do your own shopping?: Yes    Previous Hospitalizations:   Any hospitalizations or ED visits within the last 12 months?: Yes      Advance Care Planning:   Living will: No    Durable POA for healthcare: No    Advanced directive: No    Advanced directive counseling given: Yes      PREVENTIVE SCREENINGS      Cardiovascular Screening:    General: Screening Not Indicated, History Lipid Disorder and Risks and Benefits Discussed    Due for: Lipid Panel      Diabetes Screening:     General: Screening Current and Risks and Benefits Discussed      Colorectal Cancer Screening:     General: Screening Current      Breast Cancer Screening:     General: Risks and Benefits Discussed    Due for: Mammogram        Osteoporosis Screening:    General: Screening Not Indicated, History Osteoporosis and Risks and Benefits Discussed      Abdominal Aortic Aneurysm (AAA) Screening:        General: Screening Not Indicated      Lung Cancer Screening:     General: Screening Not Indicated      Hepatitis C Screening:    General: Screening Current    Screening, Brief Intervention, and Referral to Treatment (SBIRT)    Screening    Typical number of drinks in a week: 0    Brief Intervention  Alcohol & drug use screenings were reviewed  No concerns regarding substance use disorder identified  Other Counseling Topics:   Car/seat belt/driving safety, skin self-exam and sunscreen  No results found  Physical Exam:     /84   Pulse 88   Ht 5' 4" (1 626 m)   Wt 79 8 kg (176 lb)   SpO2 99%   BMI 30 21 kg/m²     Physical Exam  Constitutional:       Appearance: She is well-developed  HENT:      Head: Normocephalic and atraumatic  Right Ear: External ear normal       Left Ear: External ear normal       Nose: Nose normal    Eyes:      General: No scleral icterus  Conjunctiva/sclera: Conjunctivae normal    Neck:      Thyroid: No thyromegaly  Trachea: No tracheal deviation  Cardiovascular:      Rate and Rhythm: Normal rate and regular rhythm  Heart sounds: No murmur heard  Pulmonary:      Effort: No respiratory distress  Breath sounds: Normal breath sounds  No wheezing or rales  Abdominal:      General: Bowel sounds are normal       Palpations: Abdomen is soft  There is no mass  Tenderness: There is no abdominal tenderness  There is no guarding  Musculoskeletal:      Cervical back: Normal range of motion and neck supple  Right lower leg: No edema  Left lower leg: No edema  Lymphadenopathy:      Cervical: No cervical adenopathy  Skin:     Coloration: Skin is not jaundiced  Neurological:      Mental Status: She is alert and oriented to person, place, and time            Adriana Xiong MD

## 2023-01-04 NOTE — ASSESSMENT & PLAN NOTE
Discussed preventative health, cancer screening, immunizations, and safety issues  I recommend mammogram   She reports she had the flu shot already    I recommend getting the Shingrix shot to help prevent Shingles  You can get it a pharmacy, and they can administer it there  It is a two shot series with the second shot needed between 2-6 months after the first shot  I would not recommend getting the shot before an important or fun event in case you were to have a reaction to the shot like a sore arm or flu-like symptoms  I make the same recommendation about any shot, as people can have a reaction to any shot

## 2023-01-04 NOTE — ASSESSMENT & PLAN NOTE
Improved on lower doses of medications, but pt asking to have them increased back to the previous levels  I recommend follow up psychiatry for med adjustments  Since Seroquel was decreased from 400 mg at bedtime to 50 mg at bedtime, will slightly increase Seroquel to 100 mg at bedtime while pt awaiting eval with psych    Since neck size dose is 150 mg, will increase to 150 mg

## 2023-01-04 NOTE — PATIENT INSTRUCTIONS
Problem List Items Addressed This Visit          Nervous and Auditory    Acute encephalopathy - Primary     Improved on lower doses of medications, but pt asking to have them increased back to the previous levels  I recommend follow up psychiatry for med adjustments  Since Seroquel was decreased from 400 mg at bedtime to 50 mg at bedtime, will slightly increase Seroquel to 100 mg at bedtime while pt awaiting eval with psych  Since neck size dose is 150 mg, will increase to 150 mg            Musculoskeletal and Integument    Rheumatoid arthritis involving both feet, unspecified whether rheumatoid factor present ([facility] [address] )     Follow up podiatry            Other    Schizoaffective disorder ([facility] )     Follow up psych         Relevant Medications    QUEtiapine (SEROquel XR) 150 mg 24 hr tablet    Other Relevant Orders    Ambulatory Referral to Psychiatry    Psychiatric disorder    Relevant Medications    QUEtiapine (SEROquel XR) 150 mg 24 hr tablet    Medicare annual wellness visit, subsequent     Discussed preventative health, cancer screening, immunizations, and safety issues  I recommend mammogram   She reports she had the flu shot already    I recommend getting the Shingrix shot to help prevent Shingles  You can get it a pharmacy, and they can administer it there  It is a two shot series with the second shot needed between 2-6 months after the first shot  I would not recommend getting the shot before an important or fun event in case you were to have a reaction to the shot like a sore arm or flu-like symptoms  I make the same recommendation about any shot, as people can have a reaction to any shot           Stress incontinence     Can try Kegel exercises         Ulcer of toe, chronic, left, with unspecified severity [facility])     Follow-up podiatry          Other Visit Diagnoses       Financial difficulties        Relevant Orders    Ambulatory referral to social work care management program    Screening mammogram, encounter for        Relevant Orders    Mammo screening bilateral w cad            Medicare Preventive Visit Patient Instructions  Thank you for completing your Welcome to Medicare Visit or Medicare Annual Wellness Visit today  Your next wellness visit will be due in one year (1/5/2024)  The screening/preventive services that you may require over the next 5-10 years are detailed below  Some tests may not apply to you based off risk factors and/or age  Screening tests ordered at today's visit but not completed yet may show as past due  Also, please note that scanned in results may not display below  Preventive Screenings:  Service Recommendations Previous Testing/Comments   Colorectal Cancer Screening  * Colonoscopy    * Fecal Occult Blood Test (FOBT)/Fecal Immunochemical Test (FIT)  * Fecal DNA/Cologuard Test  * Flexible Sigmoidoscopy Age: 39-70 years old   Colonoscopy: every 10 years (may be performed more frequently if at higher risk)  OR  FOBT/FIT: every 1 year  OR  Cologuard: every 3 years  OR  Sigmoidoscopy: every 5 years  Screening may be recommended earlier than age 39 if at higher risk for colorectal cancer  Also, an individualized decision between you and your healthcare provider will decide whether screening between the ages of 74-80 would be appropriate  Colonoscopy: 09/24/2020  FOBT/FIT: 04/06/2022  Cologuard: Not on file  Sigmoidoscopy: Not on file          Breast Cancer Screening Age: 36 years old  Frequency: every 1-2 years  Not required if history of left and right mastectomy Mammogram: Not on file        Cervical Cancer Screening Between the ages of 21-29, pap smear recommended once every 3 years  Between the ages of 33-67, can perform pap smear with HPV co-testing every 5 years     Recommendations may differ for women with a history of total hysterectomy, cervical cancer, or abnormal pap smears in past  Pap Smear: Not on file        Hepatitis C Screening Once for adults born between 1945 and 1965  More frequently in patients at high risk for Hepatitis C Hep C Antibody: 07/15/2019        Diabetes Screening 1-2 times per year if you're at risk for diabetes or have pre-diabetes Fasting glucose: 126 mg/dL (2/21/2022)  A1C: 5 0 % (5/13/2021)      Cholesterol Screening Once every 5 years if you don't have a lipid disorder  May order more often based on risk factors  Lipid panel: 05/13/2021          Other Preventive Screenings Covered by Medicare:  Abdominal Aortic Aneurysm (AAA) Screening: covered once if your at risk  You're considered to be at risk if you have a family history of AAA  Lung Cancer Screening: covers low dose CT scan once per year if you meet all of the following conditions: (1) Age 50-69; (2) No signs or symptoms of lung cancer; (3) Current smoker or have quit smoking within the last 15 years; (4) You have a tobacco smoking history of at least 20 pack years (packs per day multiplied by number of years you smoked); (5) You get a written order from a healthcare provider  Glaucoma Screening: covered annually if you're considered high risk: (1) You have diabetes OR (2) Family history of glaucoma OR (3)  aged 48 and older OR (3)  American aged 72 and older  Osteoporosis Screening: covered every 2 years if you meet one of the following conditions: (1) You're estrogen deficient and at risk for osteoporosis based off medical history and other findings; (2) Have a vertebral abnormality; (3) On glucocorticoid therapy for more than 3 months; (4) Have primary hyperparathyroidism; (5) On osteoporosis medications and need to assess response to drug therapy  Last bone density test (DXA Scan): 06/08/2018  HIV Screening: covered annually if you're between the age of 12-76  Also covered annually if you are younger than 13 and older than 72 with risk factors for HIV infection  For pregnant patients, it is covered up to 3 times per pregnancy      Immunizations:  Immunization Recommendations   Influenza Vaccine Annual influenza vaccination during flu season is recommended for all persons aged >= 6 months who do not have contraindications   Pneumococcal Vaccine   * Pneumococcal conjugate vaccine = PCV13 (Prevnar 13), PCV15 (Vaxneuvance), PCV20 (Prevnar 20)  * Pneumococcal polysaccharide vaccine = PPSV23 (Pneumovax) Adults 25-60 years old: 1-3 doses may be recommended based on certain risk factors  Adults 72 years old: 1-2 doses may be recommended based off what pneumonia vaccine you previously received   Hepatitis B Vaccine 3 dose series if at intermediate or high risk (ex: diabetes, end stage renal disease, liver disease)   Tetanus (Td) Vaccine - COST NOT COVERED BY MEDICARE PART B Following completion of primary series, a booster dose should be given every 10 years to maintain immunity against tetanus  Td may also be given as tetanus wound prophylaxis  Tdap Vaccine - COST NOT COVERED BY MEDICARE PART B Recommended at least once for all adults  For pregnant patients, recommended with each pregnancy  Shingles Vaccine (Shingrix) - COST NOT COVERED BY MEDICARE PART B  2 shot series recommended in those aged 48 and above     Health Maintenance Due:      Topic Date Due    HIV Screening  Never done    Cervical Cancer Screening  Never done    Breast Cancer Screening: Mammogram  Never done    Colorectal Cancer Screening  09/24/2025    Hepatitis C Screening  Completed     Immunizations Due:      Topic Date Due    Hepatitis B Vaccine (1 of 3 - 3-dose series) Never done    COVID-19 Vaccine (4 - Booster for Pfizer series) 12/04/2021    Influenza Vaccine (1) 09/01/2022     Advance Directives   What are advance directives? Advance directives are legal documents that state your wishes and plans for medical care  These plans are made ahead of time in case you lose your ability to make decisions for yourself   Advance directives can apply to any medical decision, such as the treatments you want, and if you want to donate organs  What are the types of advance directives? There are many types of advance directives, and each state has rules about how to use them  You may choose a combination of any of the following:  Living will: This is a written record of the treatment you want  You can also choose which treatments you do not want, which to limit, and which to stop at a certain time  This includes surgery, medicine, IV fluid, and tube feedings  [facility] power of  for [facility]): This is a written record that states who you want to make healthcare choices for you when you are unable to make them for yourself  This person, called a proxy, is usually a family member or a friend  You may choose more than 1 proxy  Do not resuscitate (DNR) order:  A DNR order is used in case your heart stops beating or you stop breathing  It is a request not to have certain forms of treatment, such as CPR  A DNR order may be included in other types of advance directives  Medical directive: This covers the care that you want if you are in a coma, near death, or unable to make decisions for yourself  You can list the treatments you want for each condition  Treatment may include pain medicine, surgery, blood transfusions, dialysis, IV or tube feedings, and a ventilator (breathing machine)  Values history: This document has questions about your views, beliefs, and how you feel and think about life  This information can help others choose the care that you would choose  Why are advance directives important? An advance directive helps you control your care  Although spoken wishes may be used, it is better to have your wishes written down  Spoken wishes can be misunderstood, or not followed  Treatments may be given even if you do not want them  An advance directive may make it easier for your family to make difficult choices about your care     Weight Management   Why it is important to manage your weight:  Being overweight increases your risk of health conditions such as heart disease, high blood pressure, type 2 diabetes, and certain types of cancer  It can also increase your risk for osteoarthritis, sleep apnea, and other respiratory problems  Aim for a slow, steady weight loss  Even a small amount of weight loss can lower your risk of health problems  How to lose weight safely:  A safe and healthy way to lose weight is to eat fewer calories and get regular exercise  You can lose up about 1 pound a week by decreasing the number of calories you eat by 500 calories each day  Healthy meal plan for weight management:  A healthy meal plan includes a variety of foods, contains fewer calories, and helps you stay healthy  A healthy meal plan includes the following:  Eat whole-grain foods more often  A healthy meal plan should contain fiber  Fiber is the part of grains, fruits, and vegetables that is not broken down by your body  Whole-grain foods are healthy and provide extra fiber in your diet  Some examples of whole-grain foods are whole-wheat breads and pastas, oatmeal, brown rice, and bulgur  Eat a variety of vegetables every day  Include dark, leafy greens such as spinach, kale, brigette greens, and mustard greens  Eat yellow and orange vegetables such as carrots, sweet potatoes, and winter squash  Eat a variety of fruits every day  Choose fresh or canned fruit (canned in its own juice or light syrup) instead of juice  Fruit juice has very little or no fiber  Eat low-fat dairy foods  Drink fat-free (skim) milk or 1% milk  Eat fat-free yogurt and low-fat cottage cheese  Try low-fat cheeses such as mozzarella and other reduced-fat cheeses  Choose meat and other protein foods that are low in fat  Choose beans or other legumes such as split peas or lentils  Choose fish, skinless poultry (chicken or turkey), or lean cuts of red meat (beef or pork)  Before you cook meat or poultry, cut off any visible fat     Use less fat and oil  Try baking foods instead of frying them  Add less fat, such as margarine, sour cream, regular salad dressing and mayonnaise to foods  Eat fewer high-fat foods  Some examples of high-fat foods include french fries, doughnuts, ice cream, and cakes  Eat fewer sweets  Limit foods and drinks that are high in sugar  This includes candy, cookies, regular soda, and sweetened drinks  Exercise:  Exercise at least 30 minutes per day on most days of the week  Some examples of exercise include walking, biking, dancing, and swimming  You can also fit in more physical activity by taking the stairs instead of the elevator or parking farther away from stores  Ask your healthcare provider about the best exercise plan for you  © Copyright [employer] 2018 Information is for End User's use only and may not be sold, redistributed or otherwise used for commercial purposes   All illustrations and images included in CareNotes® are the copyrighted property of A D A M , Inc  or [address] [place] [address]

## 2023-01-05 ENCOUNTER — APPOINTMENT (EMERGENCY)
Dept: RADIOLOGY | Facility: HOSPITAL | Age: 63
End: 2023-01-05

## 2023-01-05 ENCOUNTER — TELEPHONE (OUTPATIENT)
Dept: OTHER | Facility: OTHER | Age: 63
End: 2023-01-05

## 2023-01-05 ENCOUNTER — APPOINTMENT (EMERGENCY)
Dept: CT IMAGING | Facility: HOSPITAL | Age: 63
End: 2023-01-05

## 2023-01-05 ENCOUNTER — PATIENT OUTREACH (OUTPATIENT)
Dept: INTERNAL MEDICINE CLINIC | Facility: CLINIC | Age: 63
End: 2023-01-05

## 2023-01-05 ENCOUNTER — HOSPITAL ENCOUNTER (EMERGENCY)
Facility: HOSPITAL | Age: 63
Discharge: HOME | End: 2023-01-05
Attending: EMERGENCY MEDICINE

## 2023-01-05 ENCOUNTER — TELEPHONE (OUTPATIENT)
Dept: PSYCHIATRY | Facility: CLINIC | Age: 63
End: 2023-01-05

## 2023-01-05 VITALS
OXYGEN SATURATION: 95 % | HEART RATE: 91 BPM | WEIGHT: 177.25 LBS | RESPIRATION RATE: 16 BRPM | SYSTOLIC BLOOD PRESSURE: 146 MMHG | DIASTOLIC BLOOD PRESSURE: 75 MMHG | TEMPERATURE: 98.9 F | BODY MASS INDEX: 30.42 KG/M2

## 2023-01-05 DIAGNOSIS — F41.9 ANXIETY: Primary | ICD-10-CM

## 2023-01-05 DIAGNOSIS — G47.00 INSOMNIA: ICD-10-CM

## 2023-01-05 DIAGNOSIS — R11.10 VOMITING: ICD-10-CM

## 2023-01-05 LAB
ALBUMIN SERPL BCP-MCNC: 3.9 G/DL (ref 3.5–5)
ALP SERPL-CCNC: 79 U/L (ref 34–104)
ALT SERPL W P-5'-P-CCNC: 15 U/L (ref 7–52)
AMPHETAMINES SERPL QL SCN: NEGATIVE
ANION GAP SERPL CALCULATED.3IONS-SCNC: 8 MMOL/L (ref 4–13)
APAP SERPL-MCNC: <10 UG/ML (ref 10–20)
APTT PPP: 26 SECONDS (ref 23–37)
AST SERPL W P-5'-P-CCNC: 13 U/L (ref 13–39)
BARBITURATES UR QL: NEGATIVE
BASOPHILS # BLD AUTO: 0.03 THOUSANDS/ÂΜL (ref 0–0.1)
BASOPHILS NFR BLD AUTO: 1 % (ref 0–1)
BENZODIAZ UR QL: NEGATIVE
BILIRUB SERPL-MCNC: 0.28 MG/DL (ref 0.2–1)
BILIRUB UR QL STRIP: NEGATIVE
BUN SERPL-MCNC: 10 MG/DL (ref 5–25)
CALCIUM SERPL-MCNC: 9.9 MG/DL (ref 8.4–10.2)
CARDIAC TROPONIN I PNL SERPL HS: <2 NG/L
CHLORIDE SERPL-SCNC: 97 MMOL/L (ref 96–108)
CLARITY UR: CLEAR
CO2 SERPL-SCNC: 28 MMOL/L (ref 21–32)
COCAINE UR QL: NEGATIVE
COLOR UR: NORMAL
CREAT SERPL-MCNC: 0.62 MG/DL (ref 0.6–1.3)
EOSINOPHIL # BLD AUTO: 0.06 THOUSAND/ÂΜL (ref 0–0.61)
EOSINOPHIL NFR BLD AUTO: 2 % (ref 0–6)
ERYTHROCYTE [DISTWIDTH] IN BLOOD BY AUTOMATED COUNT: 28.1 % (ref 11.6–15.1)
ETHANOL SERPL-MCNC: <10 MG/DL
FLUAV RNA RESP QL NAA+PROBE: NEGATIVE
FLUBV RNA RESP QL NAA+PROBE: NEGATIVE
GFR SERPL CREATININE-BSD FRML MDRD: 96 ML/MIN/1.73SQ M
GLUCOSE SERPL-MCNC: 95 MG/DL (ref 65–140)
GLUCOSE SERPL-MCNC: 99 MG/DL (ref 65–140)
GLUCOSE UR STRIP-MCNC: NEGATIVE MG/DL
HCT VFR BLD AUTO: 32.1 % (ref 34.8–46.1)
HGB BLD-MCNC: 9.5 G/DL (ref 11.5–15.4)
HGB UR QL STRIP.AUTO: NEGATIVE
IMM GRANULOCYTES # BLD AUTO: 0.01 THOUSAND/UL (ref 0–0.2)
IMM GRANULOCYTES NFR BLD AUTO: 0 % (ref 0–2)
INR PPP: 0.97 (ref 0.84–1.19)
KETONES UR STRIP-MCNC: NEGATIVE MG/DL
LACTATE SERPL-SCNC: 0.9 MMOL/L (ref 0.5–2)
LEUKOCYTE ESTERASE UR QL STRIP: NEGATIVE
LIPASE SERPL-CCNC: 27 U/L (ref 11–82)
LYMPHOCYTES # BLD AUTO: 0.44 THOUSANDS/ÂΜL (ref 0.6–4.47)
LYMPHOCYTES NFR BLD AUTO: 11 % (ref 14–44)
MAGNESIUM SERPL-MCNC: 1.9 MG/DL (ref 1.9–2.7)
MCH RBC QN AUTO: 21 PG (ref 26.8–34.3)
MCHC RBC AUTO-ENTMCNC: 29.6 G/DL (ref 31.4–37.4)
MCV RBC AUTO: 71 FL (ref 82–98)
METHADONE UR QL: NEGATIVE
MONOCYTES # BLD AUTO: 0.29 THOUSAND/ÂΜL (ref 0.17–1.22)
MONOCYTES NFR BLD AUTO: 7 % (ref 4–12)
NEUTROPHILS # BLD AUTO: 3.23 THOUSANDS/ÂΜL (ref 1.85–7.62)
NEUTS SEG NFR BLD AUTO: 79 % (ref 43–75)
NITRITE UR QL STRIP: NEGATIVE
NRBC BLD AUTO-RTO: 0 /100 WBCS
OPIATES UR QL SCN: NEGATIVE
OXYCODONE+OXYMORPHONE UR QL SCN: NEGATIVE
PCP UR QL: NEGATIVE
PH UR STRIP.AUTO: 7 [PH]
PLATELET # BLD AUTO: 352 THOUSANDS/UL (ref 149–390)
PMV BLD AUTO: 8.2 FL (ref 8.9–12.7)
POTASSIUM SERPL-SCNC: 4 MMOL/L (ref 3.5–5.3)
PROT SERPL-MCNC: 6.5 G/DL (ref 6.4–8.4)
PROT UR STRIP-MCNC: NEGATIVE MG/DL
PROTHROMBIN TIME: 13.1 SECONDS (ref 11.6–14.5)
RBC # BLD AUTO: 4.53 MILLION/UL (ref 3.81–5.12)
RSV RNA RESP QL NAA+PROBE: NEGATIVE
SALICYLATES SERPL-MCNC: <5 MG/DL (ref 3–20)
SARS-COV-2 RNA RESP QL NAA+PROBE: NEGATIVE
SODIUM SERPL-SCNC: 133 MMOL/L (ref 135–147)
SP GR UR STRIP.AUTO: 1.01 (ref 1–1.03)
THC UR QL: NEGATIVE
TSH SERPL DL<=0.05 MIU/L-ACNC: 1.05 UIU/ML (ref 0.45–4.5)
UROBILINOGEN UR STRIP-ACNC: <2 MG/DL
WBC # BLD AUTO: 4.06 THOUSAND/UL (ref 4.31–10.16)

## 2023-01-05 RX ORDER — ONDANSETRON 2 MG/ML
4 INJECTION INTRAMUSCULAR; INTRAVENOUS ONCE
Status: COMPLETED | OUTPATIENT
Start: 2023-01-05 | End: 2023-01-05

## 2023-01-05 RX ORDER — LORAZEPAM 1 MG/1
1 TABLET ORAL ONCE
Status: COMPLETED | OUTPATIENT
Start: 2023-01-05 | End: 2023-01-05

## 2023-01-05 RX ADMIN — LORAZEPAM 1 MG: 1 TABLET ORAL at 07:50

## 2023-01-05 RX ADMIN — SODIUM CHLORIDE 250 ML: 0.9 INJECTION, SOLUTION INTRAVENOUS at 07:50

## 2023-01-05 RX ADMIN — ONDANSETRON 4 MG: 2 INJECTION INTRAMUSCULAR; INTRAVENOUS at 07:50

## 2023-01-05 NOTE — ED NOTES
Per ED Tech, patient stated that she has decided she would prefer to go home rather than be admitted psychiatrically

## 2023-01-05 NOTE — PROGRESS NOTES
OPCM SW received SDOH referral from PCP office  Chart review completed  Patient resides with mother  Patient is currently in the ED  SW will follow for d/c and outreach at that time

## 2023-01-05 NOTE — TELEPHONE ENCOUNTER
Patient's mother called into the answering service screaming about Dr Eleonora Ramirez ordering pills for patient  Patient's mother states patient called the ambulance to take her to [address] [place] [address]  Patient's mother would like to speak with Dr Eleonora Ramirez

## 2023-01-05 NOTE — ED NOTES
This writer discussed the patients current presentation and recommended discharge plan with Dr Paula Pierce  They agree with the patient being discharged at this time with referrals and/or information about: hotline / warm line numbers; walk-in clinic information; local outpatient resource list for therapists / counselors, psychologists, psychiatrists, and partial programs; and, online / internet resources and support groups  Advised to utilize natural and existing supports  Advised for safety planning and effective coping skills  Advised to return to ED if necessary  [place] Crisis Number: Santi [phone]  National Suicide Hotline: 1-423-621-803-582-5800  Crisis Text Line: Text Connect to 078579     The patient was Instructed to follow up with their PCP, neurology, AAA, and the list of resources provided  The patient declined to complete a safety plan however a blank plan was provided for future use  In addition, the patient was instructed to call local Critical access hospital crisis, other crisis services, 911 or to go to the nearest ER immediately if their situation changes at any time  SAFETY PLAN  Warning Signs (thoughts, images, mood, behavior, situations) of a potential crisis:         Coping Skills (what can I do to take my mind off the problem, or to keep myself safe):          Outside Support (who can I reach out to for support and help):           National Suicide Prevention Hotline:  [address] 0-557-478-107-315-0611 - [facility] Crisis/Mobile Crisis   [address] [facility]: [facility]: [address]   [phone] - [facility] (1-9pm daily)  [phone] - [facility] (1-9pm daily)  [address] [facility] [address] [facility] [address] Lonny Conner ([place] - [address] [facility]

## 2023-01-05 NOTE — ED NOTES
Call to [address] [place] [address] on Aging  Spoke with Intake Dept  Who forwarded the call to the Options Coordinator, Femi Lynne, [phone]  He was not available  A voice mail message was left, requesting that he follow-up with the family  In addition, a message was left for Tay Conroy #[phone], requesting follow-up with the family

## 2023-01-05 NOTE — SEPSIS NOTE
Sepsis Note   Perez Laws 58 y o  female MRN: [id number]  Unit/Bed#: ED-17 Encounter: [id number]       qSOFA     Row Name 01/05/23 0831 01/05/23 0631             Altered mental status GCS < 15 -- --       Respiratory Rate > / =49 0 0       Systolic BP < / =997 0 0       Q Sofa Score 0 0                  Initial Sepsis Screening     Row Name 01/05/23 0836                Is the patient's history suggestive of a new or worsening infection? Yes (Proceed)  -AO        Suspected source of infection suspect infection, source unknown  -AO        Are two or more of the following signs & symptoms of infection both present and new to the patient? No  -AO        Indicate SIRS criteria Tachycardia > 90 bpm  -AO        If the answer is yes to both questions, suspicion of sepsis is present --        If severe sepsis is present AND tissue hypoperfusion perists in the hour after fluid resuscitation or lactate > 4, the patient meets criteria for SEPTIC SHOCK --        Are any of the following organ dysfunction criteria present within 6 hours of suspected infection and SIRS criteria that are NOT considered to be chronic conditions?  --        Organ dysfunction --        Date of presentation of severe sepsis --        Time of presentation of severe sepsis --        Tissue hypoperfusion persists in the hour after crystalloid fluid administration, evidenced, by either: --        Was hypotension present within one hour of the conclusion of crystalloid fluid administration? --        Date of presentation of septic shock --        Time of presentation of septic shock --              User Key  (r) = Recorded By, (t) = Taken By, (c) = Cosigned By    [address] Name Provider Teresa Marino MD Physician

## 2023-01-05 NOTE — DISCHARGE INSTRUCTIONS
The patient was provided with referrals and/or information about: hotline / warm line numbers; walk-in clinic information; local outpatient resource list for therapists / counselors, psychologists, psychiatrists, and partial programs; and, online / internet resources and support groups  Advised to utilize natural and existing supports  Advised for safety planning and effective coping skills  Advised to return to ED if necessary  [place] Crisis Number: Santi [phone]  National Suicide Hotline: 0-892.462.6112  Crisis Text Line: Text Connect to 683349     The patient was Instructed to follow up with their PCP, neurology, AAA ([facility] on Aging: Options Coordinator, Mandy Medina, [phone] and Investigator, Tai Galvan #[phone]), and the list of resources provided  The patient declined to complete a safety plan however a blank plan was provided for future use  In addition, the patient was instructed to call local [facility] crisis, other crisis services, 911 or to go to the nearest ER immediately if their situation changes at any time  SAFETY PLAN  Warning Signs (thoughts, images, mood, behavior, situations) of a potential crisis:         Coping Skills (what can I do to take my mind off the problem, or to keep myself safe):          Outside Support (who can I reach out to for support and help):           National Suicide Prevention Hotline:  [address] 4-894-305-075-392-7470 - LV[facility] Crisis/Mobile Crisis   [address]: [facility]: [address] [place] [address] Ave [phone] - Crisis   [phone] - Peer Support Talk Line (1-9pm daily)  [phone] - Teen Support Talk Line (1-9pm daily)  [address] [facility] [address] Jameson Dalila ([place]) [phone] - [facility]
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
## 2023-01-06 LAB
ATRIAL RATE: 69 BPM
P AXIS: 44 DEGREES
PR INTERVAL: 124 MS
QRS AXIS: 25 DEGREES
QRSD INTERVAL: 80 MS
QT INTERVAL: 380 MS
QTC INTERVAL: 407 MS
T WAVE AXIS: 67 DEGREES
VENTRICULAR RATE: 69 BPM

## 2023-01-08 LAB
BACTERIA BLD CULT: NORMAL
BACTERIA BLD CULT: NORMAL

## 2023-01-09 ENCOUNTER — OFFICE VISIT (OUTPATIENT)
Dept: INTERNAL MEDICINE CLINIC | Facility: CLINIC | Age: 63
End: 2023-01-09

## 2023-01-09 VITALS
WEIGHT: 177 LBS | SYSTOLIC BLOOD PRESSURE: 134 MMHG | HEART RATE: 98 BPM | HEIGHT: 64 IN | DIASTOLIC BLOOD PRESSURE: 76 MMHG | BODY MASS INDEX: 30.22 KG/M2 | OXYGEN SATURATION: 98 %

## 2023-01-09 DIAGNOSIS — I10 ESSENTIAL HYPERTENSION: ICD-10-CM

## 2023-01-09 DIAGNOSIS — G93.40 ACUTE ENCEPHALOPATHY: Primary | ICD-10-CM

## 2023-01-09 DIAGNOSIS — F41.9 ANXIETY: ICD-10-CM

## 2023-01-09 RX ORDER — LORAZEPAM 2 MG/1
2 TABLET ORAL 2 TIMES DAILY PRN
Qty: 60 TABLET | Refills: 1 | Status: SHIPPED | OUTPATIENT
Start: 2023-01-09 | End: 2023-01-19

## 2023-01-09 NOTE — PROGRESS NOTES
Name: Rochelle Coles      : 1960      MRN: [id number]  Encounter Provider: Hardeep Tenorio MD  Encounter Date: 2023   [facility] [address]     1  Acute encephalopathy  Assessment & Plan:  Improved, patient wants to follow-up with neurology based on the results of the EEG  Wants to increase the Seroquel further  She was on 400 mg at night, decreased to 50 mg at night by psychiatry in the hospital, then increased up to 150 mg at bedtime, I recommend follow-up psychiatry      2  Anxiety  Assessment & Plan:  Patient reports she has built up a tolerance to lorazepam and 1 mg of lorazepam does not do anything for her, she was on 2 mg twice a day previously, and is requesting to go back up to this dose  Will increase back to his dose while she is waiting to see psychiatry    Orders:  -     LORazepam (ATIVAN) 2 mg tablet; Take 1 tablet (2 mg total) by mouth 2 (two) times a day as needed for anxiety for up to 10 days    3  Essential hypertension  Assessment & Plan:  Continue med along with healthy diet           Subjective     Pt feels she is in withdrawal on the lower dose of lorazepam   She is taking 1 mg tablets 2 tabs daily  She was on 2 mg twice a day on average in the past   She was also on 400 mg of Seroquel which was decreased to 50 mg at bedtime by psychiatry in the hospital, then increased to 150 mg bedtime  Pt reports she has been under a lot of stress at home  Pt worried about results of EEG  She has also been dealing with GI issues, and she reports her father  of esophageal cancer  Review of Systems   Constitutional: Positive for fatigue (mild)  Respiratory: Negative for chest tightness and shortness of breath  Gastrointestinal: Negative for abdominal pain, blood in stool, nausea and vomiting  Psychiatric/Behavioral: Positive for dysphoric mood and sleep disturbance  The patient is nervous/anxious          Past Medical History:   Diagnosis Date   • Anxiety    • Arthritis    • Back pain at L4-L5 level    • Schreiber esophagus    • Bulimia    • Colon polyp    • Disease of thyroid gland     hypothyroid   • Ear problems    • GERD (gastroesophageal reflux disease)    • History of transfusion    • Hyperlipidemia    • Hypertension    • Hypothyroidism    • Leukopenia    • Nasal congestion    • NMS (neuroleptic malignant syndrome) 01/03/2020   • Pneumonia    • Rheumatoid arthritis involving right hip (HCC)    • Sciatica    • Seizure ([address] )     due to medication mix up 8/2018 only one historically   • Seizures (HCC)    • Synovial cyst of lumbar spine    • Vertigo    • Weight gain      Past Surgical History:   Procedure Laterality Date   • BONE MARROW BIOPSY     • BREAST SURGERY      enlargement with implants   • CLOSED REDUCTION DISTAL FEMUR FRACTURE     • COLONOSCOPY     • COSMETIC SURGERY     • DENTAL SURGERY     • HIP ARTHROPLASTY Right 1/2/2020    Procedure: REVISION TOTAL HIP ARTHROPLASTY WITH REPAIR OF PARIPROSTHETIC FRACTURE - POSTERIOR APPROACH;  Surgeon: Mack Bang MD;  [facility];  Service: Orthopedics   • [place] ARTHRP ACETBLR/PROX FEM PROSTC AGRFT/ALGRFT Right 12/11/2019    Procedure: ARTHROPLASTY HIP TOTAL ANTERIOR;  Surgeon: Mack Bang MD;  [facility];  Service: Orthopedics   • [place] ESOPHAGOGASTRODUODENOSCOPY TRANSORAL DIAGNOSTIC N/A 5/11/2021    Procedure: ESOPHAGOGASTRODUODENOSCOPY (EGD); Surgeon: Jess Valle MD;  [facility];  Service: General   • [place] LAPS RPR PARAESPHGL HRNA INCL FUNDPLSTY [address] N/A 5/11/2021    Procedure: REPAIR HERNIA PARAESOPHAGEAL  LAPAROSCOPIC;  Surgeon:  Jess Valle MD;  [facility];  Service: General   • [place] UNLISTED PROCEDURE ESOPHAGUS N/A 5/11/2021    Procedure: FUNDOPLICATION TRANSORAL INCISIONLESS;  Surgeon: Clyde Canales MD;  [facility];  Service: Gastroenterology   • RHINOPLASTY     • SINUS SURGERY     • TRANSORAL INCISIONLESS FUNDOPLICATION (TIF)  56/66/5392     Family History   Problem Relation Age of Onset   • Uterine cancer Mother    • Esophageal cancer Father    • Colon cancer Maternal Grandmother      Social History     Socioeconomic History   • Marital status: Single     Spouse name: None   • Number of children: None   • Years of education: None   • Highest education level: None   Occupational History   • None   Tobacco Use   • Smoking status: Never   • Smokeless tobacco: Never   Vaping Use   • Vaping Use: Never used   Substance and Sexual Activity   • Alcohol use: Not Currently   • Drug use: Not Currently   • Sexual activity: Not Currently   Other Topics Concern   • None   Social History Narrative    Family disruption death of family member parent, per allscripts     Social Determinants of Health     Financial Resource Strain: Medium Risk   • Difficulty of Paying Living Expenses: Somewhat hard   Food Insecurity: No Food Insecurity   • Worried About Running Out of Food in the Last Year: Never true   • Ran Out of Food in the Last Year: Never true   Transportation Needs: No Transportation Needs   • Lack of Transportation (Medical): No   • Lack of Transportation (Non-Medical): No   Physical Activity: Not on file   Stress: Not on file   Social Connections: Not on file   Intimate Partner Violence: Not on file   Housing Stability: Low Risk    • Unable to Pay for Housing in the Last Year: No   • Number of Places Lived in the Last Year: 1   • Unstable Housing in the Last Year: No     Current Outpatient Medications on File Prior to Visit   Medication Sig   • amLODIPine (NORVASC) 5 mg tablet TAKE 1 TABLET (5 MG TOTAL) BY MOUTH DAILY     • ferrous sulfate 325 (65 Fe) mg tablet Take 1 tablet (325 mg total) by mouth 2 (two) times a day with meals   • levothyroxine 25 mcg tablet TAKE 1 TABLET BY MOUTH EVERY DAY   • ondansetron (ZOFRAN) 4 mg tablet Take 1 tablet (4 mg total) by mouth every 8 (eight) hours as needed for nausea or vomiting   • pantoprazole (PROTONIX) 40 mg tablet Take 1 tablet (40 mg total) by mouth 2 (two) times a day   • PARoxetine (PAXIL) 30 mg tablet Take 2 tablets (60mg total) by mouth daily   • prochlorperazine (COMPAZINE) 10 mg tablet Take 1 tablet (10 mg total) by mouth every 6 (six) hours as needed for nausea or vomiting   • QUEtiapine (SEROquel XR) 150 mg 24 hr tablet Take 1 tablet (150 mg total) by mouth daily at bedtime   • sucralfate (CARAFATE) 1 g tablet TAKE 1 TABLET (1 G TOTAL) BY MOUTH 3 (THREE) TIMES A DAY WITH MEALS   • triamcinolone (KENALOG) 0 1 % cream For acute flares, apply topically twice daily for up to 2 weeks at a time and then take one week off  DO NOT use on the face, armpits, and groin area  When not flaring, can use 1-2 times weekly on areas prone to rash  (Patient taking differently: if needed For acute flares, apply topically twice daily for up to 2 weeks at a time and then take one week off  DO NOT use on the face, armpits, and groin area   When not flaring, can use 1-2 times weekly on areas prone to rash )   • ziprasidone (GEODON) 80 mg capsule TAKE 1 CAPSULE BY MOUTH EVERY DAY   • [DISCONTINUED] LORazepam (ATIVAN) 1 mg tablet Take 1 tablet (1 mg total) by mouth daily as needed for anxiety for up to 10 days     Allergies   Allergen Reactions   • Risperdal [Risperidone] Shortness Of Breath     Rapid heart beat, SOB   • Zyprexa [Olanzapine] Shortness Of Breath     Rapid heartbeat   • Latex Rash     Band aids, adhesives wears normal underwear, can eat bananas  USE PAPER TAPE     Immunization History   Administered Date(s) Administered   • COVID-19 PFIZER VACCINE 0 3 ML IM 03/20/2021, 04/10/2021, 10/09/2021   • H1N1, All Formulations 11/17/2009   • INFLUENZA 11/17/2009, 01/04/2013, 11/11/2014, 10/20/2015, 10/31/2016, 09/16/2018   • Influenza Injectable, MDCK, Preservative Free, Quadrivalent, 0 5 mL 09/26/2019   • Influenza Quadrivalent, 6-35 Months IM 10/31/2016   • Influenza, injectable, quadrivalent, preservative free 0 5 mL 09/05/2020   • Influenza, recombinant, quadrivalent,injectable, preservative free 11/04/2021   • Pneumococcal Conjugate 13-Valent 09/16/2018   • Rabies 07/29/2014, 08/01/2014, 08/05/2014   • Tdap 07/29/2014, 12/17/2022       Objective     /76 (BP Location: Left arm, Patient Position: Sitting, Cuff Size: Large)   Pulse 98   Ht 5' 4" (1 626 m)   Wt 80 3 kg (177 lb)   SpO2 98%   BMI 30 38 kg/m²     Physical Exam  Constitutional:       General: She is not in acute distress  Cardiovascular:      Rate and Rhythm: Normal rate and regular rhythm  Heart sounds: No murmur heard  Pulmonary:      Effort: Pulmonary effort is normal  No respiratory distress         Yaneli Banks MD

## 2023-01-09 NOTE — ASSESSMENT & PLAN NOTE
Improved, patient wants to follow-up with neurology based on the results of the EEG  Wants to increase the Seroquel further    She was on 400 mg at night, decreased to 50 mg at night by psychiatry in the hospital, then increased up to 150 mg at bedtime, I recommend follow-up psychiatry

## 2023-01-09 NOTE — ASSESSMENT & PLAN NOTE
Patient reports she has built up a tolerance to lorazepam and 1 mg of lorazepam does not do anything for her, she was on 2 mg twice a day previously, and is requesting to go back up to this dose    Will increase back to his dose while she is waiting to see psychiatry

## 2023-01-09 NOTE — PATIENT INSTRUCTIONS
Problem List Items Addressed This Visit          Cardiovascular and Mediastinum    Essential hypertension     Continue med along with healthy diet            Nervous and Auditory    Acute encephalopathy - Primary     Improved, patient wants to follow-up with neurology based on the results of the EEG  Wants to increase the Seroquel further  She was on 400 mg at night, decreased to 50 mg at night by psychiatry in the hospital, then increased up to 150 mg at bedtime, I recommend follow-up psychiatry            Other    Anxiety     Patient reports she has built up a tolerance to lorazepam and 1 mg of lorazepam does not do anything for her, she was on 2 mg twice a day previously, and is requesting to go back up to this dose    Will increase back to his dose while she is waiting to see psychiatry         Relevant Medications    LORazepam (ATIVAN) 2 mg tablet

## 2023-01-10 ENCOUNTER — PATIENT OUTREACH (OUTPATIENT)
Dept: INTERNAL MEDICINE CLINIC | Facility: CLINIC | Age: 63
End: 2023-01-10

## 2023-01-10 LAB
BACTERIA BLD CULT: NORMAL
BACTERIA BLD CULT: NORMAL

## 2023-01-10 NOTE — PROGRESS NOTES
[facility]CM [facility]IM placed 2nd [id number] [id number] outreach phone call to patient  Patient reports she cannot talk right now as she is waiting for her mother to call from the hospital   Patient asked if ASIM can call back at another time  ASIM agreed  ASIM will return call another time

## 2023-01-13 NOTE — TELEPHONE ENCOUNTER
Connected with patient regarding Routine referral  Confirmed that patient would like to be added to the Medication Management wait list    Any specific preferences can be listed below:   Face to Face or Virtual: Face to Face  Male or Female: Any  [place] Preference: [place]

## 2023-01-18 ENCOUNTER — PATIENT OUTREACH (OUTPATIENT)
Dept: INTERNAL MEDICINE CLINIC | Facility: CLINIC | Age: 63
End: 2023-01-18

## 2023-01-18 NOTE — PROGRESS NOTES
OPCM SW placed 2nd outreach attempt to patient to discuss SDOH referral       Voicemail received and message left with reason for phone call and this SW contact informaton  Awaiting return call from patient  Unable to reach letter mailed and referral closed due to not being able to connect with patient  SW remains available

## 2023-01-18 NOTE — LETTER
[address] [address]    Re: [employer] to reach you   1/18/2023       Dear Gardenia Christopher am a [address] [place] [address] Work  and wanted to be certain you had information to contact me should you desire assistance with or have questions about non-medical aspects of your care such as [but not limited to] medical insurance, housing, transportation, financial needs, or emergency needs  If I do not have an answer I will assist you in finding the appropriate agency or individual who can help  I have tried to reach you and have not been successful  Please feel free to contact me at [phone]  Thank You      Sincerely,         DARIO ChildsW

## 2023-01-24 DIAGNOSIS — I10 HYPERTENSION: ICD-10-CM

## 2023-01-24 DIAGNOSIS — K22.10 EROSIVE ESOPHAGITIS: ICD-10-CM

## 2023-01-24 DIAGNOSIS — F41.9 ANXIETY: ICD-10-CM

## 2023-01-24 RX ORDER — AMLODIPINE BESYLATE 5 MG/1
5 TABLET ORAL DAILY
Qty: 90 TABLET | Refills: 3 | Status: SHIPPED | OUTPATIENT
Start: 2023-01-24

## 2023-01-24 RX ORDER — PANTOPRAZOLE SODIUM 40 MG/1
40 TABLET, DELAYED RELEASE ORAL 2 TIMES DAILY
Qty: 180 TABLET | Refills: 3 | Status: SHIPPED | OUTPATIENT
Start: 2023-01-24

## 2023-01-24 RX ORDER — PAROXETINE 30 MG/1
TABLET, FILM COATED ORAL
Qty: 180 TABLET | Refills: 3 | Status: SHIPPED | OUTPATIENT
Start: 2023-01-24

## 2023-01-25 ENCOUNTER — TELEPHONE (OUTPATIENT)
Dept: INTERNAL MEDICINE CLINIC | Facility: CLINIC | Age: 63
End: 2023-01-25

## 2023-01-25 DIAGNOSIS — M54.50 CHRONIC BILATERAL LOW BACK PAIN WITHOUT SCIATICA: ICD-10-CM

## 2023-01-25 DIAGNOSIS — M54.16 LUMBAR RADICULOPATHY: Primary | ICD-10-CM

## 2023-01-25 DIAGNOSIS — G89.29 CHRONIC BILATERAL LOW BACK PAIN WITHOUT SCIATICA: ICD-10-CM

## 2023-01-25 NOTE — TELEPHONE ENCOUNTER
The patient has lower back pain and would  Like a referral to pain management  Please advise thank you

## 2023-01-26 ENCOUNTER — PATIENT OUTREACH (OUTPATIENT)
Dept: INTERNAL MEDICINE CLINIC | Facility: CLINIC | Age: 63
End: 2023-01-26

## 2023-01-26 ENCOUNTER — HOSPITAL ENCOUNTER (OUTPATIENT)
Dept: RADIOLOGY | Age: 63
Discharge: HOME | End: 2023-01-26

## 2023-01-26 DIAGNOSIS — R11.14 BILIOUS VOMITING WITH NAUSEA: ICD-10-CM

## 2023-01-26 DIAGNOSIS — R11.2 NAUSEA AND VOMITING, UNSPECIFIED VOMITING TYPE: ICD-10-CM

## 2023-01-26 NOTE — PROGRESS NOTES
[facility] ASIM received voicemail from patient stati that she received letter from ASIM  Patient was an [facility] referral for finances  Mother is currently hospitalized for 3 weeks  Aging came in and made mother a freitas of the state and is sending mother to rehab and eventually to intermediate  Patient states she has hired a  to assist with getting mother home  Patient reports that they have a hardship with paying the bills, but the bills are always paid  Patient reports that the biggest hardship is the real estate taxes  All bills including utilities are in mother's name  Home is owned by mother  All utilities are paid and up to date  Patient indicates no food insecurity  Patient drives to grocery, pharmacy, and medical appointments  ASIM suggested [facility] for assistance with taxes  Patient will reach out for any available assistance  Referral closed  SW remains available for future needs

## 2023-01-27 ENCOUNTER — OFFICE VISIT (OUTPATIENT)
Dept: INTERNAL MEDICINE CLINIC | Facility: CLINIC | Age: 63
End: 2023-01-27

## 2023-01-27 VITALS
WEIGHT: 176 LBS | BODY MASS INDEX: 30.05 KG/M2 | SYSTOLIC BLOOD PRESSURE: 158 MMHG | DIASTOLIC BLOOD PRESSURE: 86 MMHG | OXYGEN SATURATION: 99 % | HEIGHT: 64 IN | HEART RATE: 99 BPM

## 2023-01-27 DIAGNOSIS — I10 ESSENTIAL HYPERTENSION: ICD-10-CM

## 2023-01-27 DIAGNOSIS — F41.9 ANXIETY: Primary | ICD-10-CM

## 2023-01-27 DIAGNOSIS — L97.529: Primary | ICD-10-CM

## 2023-01-27 RX ORDER — LORAZEPAM 2 MG/1
2 TABLET ORAL EVERY 6 HOURS PRN
Qty: 120 TABLET | Refills: 1 | Status: SHIPPED | OUTPATIENT
Start: 2023-01-27 | End: 2023-02-06

## 2023-01-27 NOTE — ASSESSMENT & PLAN NOTE
Patient has been on 2 mg lorazepam every 6 hours for a long time  Attempts have been made to titrate down, and most recently was on once a day which she did not tolerate, then increase to twice a day which she is not tolerating  Pt wants to go back on every 6 hours dosing  She reports she is having difficulty managing her stress at lower doses    Will increase back to this dose as needed meaning she can try to use less such as 3 times a day instead of 4 times a day while she is waiting to follow-up with psychiatry

## 2023-01-27 NOTE — PATIENT INSTRUCTIONS
Problem List Items Addressed This Visit          Cardiovascular and Mediastinum    Essential hypertension     Blood pressure a little elevated today, but patient is stressed out            Other    Anxiety - Primary     Patient has been on 2 mg lorazepam every 6 hours for a long time  Attempts have been made to titrate down, and most recently was on once a day which she did not tolerate, then increase to twice a day which she is not tolerating  Pt wants to go back on every 6 hours dosing  She reports she is having difficulty managing her stress at lower doses    Will increase back to this dose as needed meaning she can try to use less such as 3 times a day instead of 4 times a day while she is waiting to follow-up with psychiatry         Relevant Medications    LORazepam (ATIVAN) 2 mg tablet

## 2023-01-27 NOTE — PROGRESS NOTES
Name: Dixie Cortes      : 1960      MRN: [id number]  Encounter Provider: Melanie Francisco MD  Encounter Date: 2023   [facility] [address] [place] [address],[address]     1  Anxiety  Assessment & Plan:  Patient has been on 2 mg lorazepam every 6 hours for a long time  Attempts have been made to titrate down, and most recently was on once a day which she did not tolerate, then increase to twice a day which she is not tolerating  Pt wants to go back on every 6 hours dosing  She reports she is having difficulty managing her stress at lower doses  Will increase back to this dose as needed meaning she can try to use less such as 3 times a day instead of 4 times a day while she is waiting to follow-up with psychiatry    Orders:  -     LORazepam (ATIVAN) 2 mg tablet; Take 1 tablet (2 mg total) by mouth every 6 (six) hours as needed for anxiety for up to 10 days    2  Essential hypertension  Assessment & Plan:  Blood pressure a little elevated today, but patient is stressed out             Subjective     Pt here for increased stress and wants more Ativan  Her mother is in hospital is being deemed a "freitas of the state" and is not coming home  Patients mother was found to not have capacity to make medical decisions  Review of Systems   Constitutional: Negative for chills and fever  Respiratory: Negative for chest tightness and shortness of breath  Cardiovascular: Negative for chest pain  Psychiatric/Behavioral: The patient is nervous/anxious          Past Medical History:   Diagnosis Date   • Anxiety    • Arthritis    • Back pain at L4-L5 level    • Schreiber esophagus    • Bulimia    • Colon polyp    • Disease of thyroid gland     hypothyroid   • Ear problems    • GERD (gastroesophageal reflux disease)    • History of transfusion    • Hyperlipidemia    • Hypertension    • Hypothyroidism    • Leukopenia    • Nasal congestion    • NMS (neuroleptic malignant syndrome) 01/03/2020   • Pneumonia    • Rheumatoid arthritis involving right hip (HCC)    • Sciatica    • Seizure ([address] )     due to medication mix up 8/2018 only one historically   • Seizures (HCC)    • Synovial cyst of lumbar spine    • Vertigo    • Weight gain      Past Surgical History:   Procedure Laterality Date   • BONE MARROW BIOPSY     • BREAST SURGERY      enlargement with implants   • CLOSED REDUCTION DISTAL FEMUR FRACTURE     • COLONOSCOPY     • COSMETIC SURGERY     • DENTAL SURGERY     • HIP ARTHROPLASTY Right 1/2/2020    Procedure: REVISION TOTAL HIP ARTHROPLASTY WITH REPAIR OF PARIPROSTHETIC FRACTURE - POSTERIOR APPROACH;  Surgeon: Rachid Wilder MD;  [facility];  Service: Orthopedics   • [place] ARTHRP ACETBLR/PROX FEM PROSTC AGRFT/ALGRFT Right 12/11/2019    Procedure: ARTHROPLASTY HIP TOTAL ANTERIOR;  Surgeon: Rachid Wilder MD;  [facility];  Service: Orthopedics   • [place] ESOPHAGOGASTRODUODENOSCOPY TRANSORAL DIAGNOSTIC N/A 5/11/2021    Procedure: ESOPHAGOGASTRODUODENOSCOPY (EGD); Surgeon: Shital Morgan MD;  [facility];  Service: General   • [place] LAPS RPR PARAESPHGL HRNA INCL FUNDPLSTY [address] [facility] N/A 5/11/2021    Procedure: REPAIR HERNIA PARAESOPHAGEAL  LAPAROSCOPIC;  Surgeon:  Shital Morgan MD;  [facility];  Service: General   • [place] UNLISTED PROCEDURE ESOPHAGUS N/A 5/11/2021    Procedure: FUNDOPLICATION TRANSORAL INCISIONLESS;  Surgeon: Ciara Tapia MD;  [facility];  Service: Gastroenterology   • RHINOPLASTY     • SINUS SURGERY     • TRANSORAL INCISIONLESS FUNDOPLICATION (TIF)  98/26/5346     Family History   Problem Relation Age of Onset   • Uterine cancer Mother    • Esophageal cancer Father    • Colon cancer Maternal Grandmother      Social History     Socioeconomic History   • Marital status: Single     Spouse name: None   • Number of children: None   • Years of education: None   • Highest education level: None   Occupational History   • None   Tobacco Use   • Smoking status: Never   • Smokeless tobacco: Never   Vaping Use   • Vaping Use: Never used   Substance and Sexual Activity   • Alcohol use: Not Currently   • Drug use: Not Currently   • Sexual activity: Not Currently   Other Topics Concern   • None   Social History Narrative    Family disruption death of family member parent, per adore     Social Determinants of Health     Financial Resource Strain: Medium Risk   • Difficulty of Paying Living Expenses: Somewhat hard   Food Insecurity: No Food Insecurity   • Worried About Running Out of Food in the Last Year: Never true   • Ran Out of Food in the Last Year: Never true   Transportation Needs: No Transportation Needs   • Lack of Transportation (Medical): No   • Lack of Transportation (Non-Medical): No   Physical Activity: Not on file   Stress: Not on file   Social Connections: Not on file   Intimate Partner Violence: Not on file   Housing Stability: Low Risk    • Unable to Pay for Housing in the Last Year: No   • Number of Places Lived in the Last Year: 1   • Unstable Housing in the Last Year: No     Current Outpatient Medications on File Prior to Visit   Medication Sig   • amLODIPine (NORVASC) 5 mg tablet TAKE 1 TABLET (5 MG TOTAL) BY MOUTH DAILY     • ferrous sulfate 325 (65 Fe) mg tablet Take 1 tablet (325 mg total) by mouth 2 (two) times a day with meals   • levothyroxine 25 mcg tablet TAKE 1 TABLET BY MOUTH EVERY DAY   • ondansetron (ZOFRAN) 4 mg tablet Take 1 tablet (4 mg total) by mouth every 8 (eight) hours as needed for nausea or vomiting   • pantoprazole (PROTONIX) 40 mg tablet Take 1 tablet (40 mg total) by mouth 2 (two) times a day   • PARoxetine (PAXIL) 30 mg tablet TAKE 2 TABLETS (60MG TOTAL) BY MOUTH DAILY   • prochlorperazine (COMPAZINE) 10 mg tablet Take 1 tablet (10 mg total) by mouth every 6 (six) hours as needed for nausea or vomiting   • QUEtiapine (SEROquel XR) 150 mg 24 hr tablet Take 1 tablet (150 mg total) by mouth daily at bedtime   • sucralfate (CARAFATE) 1 g tablet TAKE 1 TABLET (1 G TOTAL) BY MOUTH 3 (THREE) TIMES A DAY WITH MEALS   • triamcinolone (KENALOG) 0 1 % cream For acute flares, apply topically twice daily for up to 2 weeks at a time and then take one week off  DO NOT use on the face, armpits, and groin area  When not flaring, can use 1-2 times weekly on areas prone to rash  (Patient taking differently: if needed For acute flares, apply topically twice daily for up to 2 weeks at a time and then take one week off  DO NOT use on the face, armpits, and groin area   When not flaring, can use 1-2 times weekly on areas prone to rash )   • ziprasidone (GEODON) 80 mg capsule TAKE 1 CAPSULE BY MOUTH EVERY DAY   • [DISCONTINUED] LORazepam (ATIVAN) 2 mg tablet Take 1 tablet (2 mg total) by mouth 2 (two) times a day as needed for anxiety for up to 10 days     Allergies   Allergen Reactions   • Risperdal [Risperidone] Shortness Of Breath     Rapid heart beat, SOB   • Zyprexa [Olanzapine] Shortness Of Breath     Rapid heartbeat   • Latex Rash     Band aids, adhesives wears normal underwear, can eat bananas  USE PAPER TAPE     Immunization History   Administered Date(s) Administered   • COVID-19 PFIZER VACCINE 0 3 ML IM 03/20/2021, 04/10/2021, 10/09/2021   • H1N1, All Formulations 11/17/2009   • INFLUENZA 11/17/2009, 01/04/2013, 11/11/2014, 10/20/2015, 10/31/2016, 09/16/2018   • Influenza Injectable, MDCK, Preservative Free, Quadrivalent, 0 5 mL 09/26/2019   • Influenza Quadrivalent, 6-35 Months IM 10/31/2016   • Influenza, injectable, quadrivalent, preservative free 0 5 mL 09/05/2020   • Influenza, recombinant, quadrivalent,injectable, preservative free 11/04/2021   • Pneumococcal Conjugate 13-Valent 09/16/2018   • Rabies 07/29/2014, 08/01/2014, 08/05/2014   • Tdap 07/29/2014, 12/17/2022       Objective     /86 (BP Location: Left arm, Patient Position: Sitting, Cuff Size: Standard)   Pulse 99   Ht 5' 4" (1 626 m)   Wt 79 8 kg (176 lb) SpO2 99%   BMI 30 21 kg/m²     Physical Exam  Cardiovascular:      Rate and Rhythm: Normal rate and regular rhythm  Heart sounds: Normal heart sounds  No murmur heard  Pulmonary:      Effort: Pulmonary effort is normal  No respiratory distress  Breath sounds: Normal breath sounds  Neurological:      Mental Status: She is alert         Antolin Alba MD

## 2023-01-29 DIAGNOSIS — K22.10 EROSIVE ESOPHAGITIS: ICD-10-CM

## 2023-01-29 DIAGNOSIS — F25.1 SCHIZOAFFECTIVE DISORDER, DEPRESSIVE TYPE (HCC): ICD-10-CM

## 2023-01-29 DIAGNOSIS — F99 PSYCHIATRIC DISORDER: ICD-10-CM

## 2023-01-29 RX ORDER — SUCRALFATE 1 G/1
1 TABLET ORAL
Qty: 270 TABLET | Refills: 2 | Status: SHIPPED | OUTPATIENT
Start: 2023-01-29

## 2023-01-30 RX ORDER — QUETIAPINE 150 MG/1
TABLET, FILM COATED, EXTENDED RELEASE ORAL
Qty: 90 TABLET | Refills: 2 | Status: SHIPPED | OUTPATIENT
Start: 2023-01-30

## 2023-01-31 ENCOUNTER — OFFICE VISIT (OUTPATIENT)
Dept: GASTROENTEROLOGY | Facility: CLINIC | Age: 63
End: 2023-01-31

## 2023-01-31 ENCOUNTER — TELEPHONE (OUTPATIENT)
Dept: GASTROENTEROLOGY | Facility: CLINIC | Age: 63
End: 2023-01-31

## 2023-01-31 VITALS
SYSTOLIC BLOOD PRESSURE: 121 MMHG | HEIGHT: 64 IN | WEIGHT: 176 LBS | HEART RATE: 97 BPM | DIASTOLIC BLOOD PRESSURE: 81 MMHG | BODY MASS INDEX: 30.05 KG/M2

## 2023-01-31 DIAGNOSIS — K31.84 GASTROPARESIS: Primary | ICD-10-CM

## 2023-01-31 DIAGNOSIS — K21.9 GASTROESOPHAGEAL REFLUX DISEASE WITHOUT ESOPHAGITIS: ICD-10-CM

## 2023-01-31 NOTE — TELEPHONE ENCOUNTER
Scheduled date of EGD w/ Botox (as of today): 3/2/23  Physician performing EGD: Dr Michael Sousa  Location of EGD: [facility]  Instructions reviewed with patient by: cal  Clearances: n/a

## 2023-01-31 NOTE — PROGRESS NOTES
[facility] - Outpatient Follow-up Note  Romel Mari 58 y o  female MRN: [id number]  Encounter: [id number]          ASSESSMENT AND PLAN:      1  Gastroparesis  59-year female with history of chronic intractable nausea, vomiting, history of GERD, history of hiatal hernia repair along with TIF, she come for follow-up, we ordered a gastric emptying study recently which shows severe delayed in gastric emptying  She take multiple psychiatric medical including risperidone and Geodon which can interfere with gastric motility  I discussed about gastroparesis treatment plan, will start on low-fat and low fiber in the diet, she is not candidate for Reglan because there is a major interaction between Reglan and risperidone  I talked to her about additional treatment option including EGD with Botox and if she responded to Botox and we will plan for pyloroplasty  She agreed with the plan and want to schedule first endoscopic intervention  - EGD Botox; Future    2  Gastroesophageal reflux disease without esophagitis  See s/p hiatal hernia repair along with TIF, last endoscopy shows recurrence of hiatal hernia along with LA grade B reflux esophagitis  She will continue on Carafate 4 times a day and pantoprazole in the morning  - EGD Botox; Future    ______________________________________________________________________    SUBJECTIVE: Patient seen and examined, she come for follow-up after gastric emptying study, she has frequent hospital admission with intractable nausea and vomiting, last endoscopy shows recurrence of hiatal hernia, fundoplication site appeared normal, LA grade B reflux esophagitis    I advised her to restart sucralfate 4 times a day and pantoprazole in the morning which she is taking, reflux symptoms are well controlled but still have chronic nausea, gastric emptying study was done recently which came back abnormal, she has a multiple question about gastroparesis which are answered  I think etiology for gastroparesis is related to Geodon and Seroquel  She denying any history of diabetes mellitus  Her weight remains stable  She complains occasional constipation  She denying any smoking, no marijuana use, no chronic alcohol use      REVIEW OF SYSTEMS IS OTHERWISE NEGATIVE  Historical Information   Past Medical History:   Diagnosis Date   • Anxiety    • Arthritis    • Back pain at L4-L5 level    • Schreiber esophagus    • Bulimia    • Colon polyp    • Disease of thyroid gland     hypothyroid   • Ear problems    • GERD (gastroesophageal reflux disease)    • History of transfusion    • Hyperlipidemia    • Hypertension    • Hypothyroidism    • Leukopenia    • Nasal congestion    • NMS (neuroleptic malignant syndrome) 01/03/2020   • Pneumonia    • Rheumatoid arthritis involving right hip (HCC)    • Sciatica    • Seizure ([address] )     due to medication mix up 8/2018 only one historically   • Seizures (HCC)    • Synovial cyst of lumbar spine    • Vertigo    • Weight gain      Past Surgical History:   Procedure Laterality Date   • BONE MARROW BIOPSY     • BREAST SURGERY      enlargement with implants   • CLOSED REDUCTION DISTAL FEMUR FRACTURE     • COLONOSCOPY     • COSMETIC SURGERY     • DENTAL SURGERY     • HIP ARTHROPLASTY Right 1/2/2020    Procedure: REVISION TOTAL HIP ARTHROPLASTY WITH REPAIR OF PARIPROSTHETIC FRACTURE - POSTERIOR APPROACH;  Surgeon: Dixie Mari MD;  [facility];  Service: Orthopedics   • SC ARTHRP ACETBLR/PROX FEM PROSTC AGRFT/ALGRFT Right 12/11/2019    Procedure: ARTHROPLASTY HIP TOTAL ANTERIOR;  Surgeon: Dixie Mari MD;  [facility];  Service: Orthopedics   • SC ESOPHAGOGASTRODUODENOSCOPY TRANSORAL DIAGNOSTIC N/A 5/11/2021    Procedure: ESOPHAGOGASTRODUODENOSCOPY (EGD); Surgeon:  Husam Medina MD;  [facility];  Service: General   • SC LAPS RPR PARAESPHGL HRNA INCL FUNDPLSTY [facility] N/A 5/11/2021    Procedure: REPAIR HERNIA PARAESOPHAGEAL  LAPAROSCOPIC;  Surgeon: Marsha Fuller MD;  [facility];  Service: General   • WA UNLISTED PROCEDURE ESOPHAGUS N/A 5/11/2021    Procedure: FUNDOPLICATION TRANSORAL INCISIONLESS;  Surgeon: Gianna Jay MD;  [facility];  Service: Gastroenterology   • RHINOPLASTY     • SINUS SURGERY     • TRANSORAL INCISIONLESS FUNDOPLICATION (TIF)  06/80/5757     Social History   Social History     Substance and Sexual Activity   Alcohol Use Not Currently     Social History     Substance and Sexual Activity   Drug Use Not Currently     Social History     Tobacco Use   Smoking Status Never   Smokeless Tobacco Never     Family History   Problem Relation Age of Onset   • Uterine cancer Mother    • Esophageal cancer Father    • Colon cancer Maternal Grandmother        Meds/Allergies       Current Outpatient Medications:   •  amLODIPine (NORVASC) 5 mg tablet  •  ferrous sulfate 325 (65 Fe) mg tablet  •  levothyroxine 25 mcg tablet  •  LORazepam (ATIVAN) 2 mg tablet  •  ondansetron (ZOFRAN) 4 mg tablet  •  pantoprazole (PROTONIX) 40 mg tablet  •  PARoxetine (PAXIL) 30 mg tablet  •  QUEtiapine (SEROquel XR) 150 mg 24 hr tablet  •  sucralfate (CARAFATE) 1 g tablet  •  triamcinolone (KENALOG) 0 1 % cream  •  ziprasidone (GEODON) 80 mg capsule  •  prochlorperazine (COMPAZINE) 10 mg tablet    Allergies   Allergen Reactions   • Risperdal [Risperidone] Shortness Of Breath     Rapid heart beat, SOB   • Zyprexa [Olanzapine] Shortness Of Breath     Rapid heartbeat   • Latex Rash     Band aids, adhesives wears normal underwear, can eat bananas  USE PAPER TAPE           Objective     Blood pressure 121/81, pulse 97, height 5' 4" (1 626 m), weight 79 8 kg (176 lb), not currently breastfeeding  Body mass index is 30 21 kg/m²        PHYSICAL EXAM:      General Appearance:   Alert, cooperative, no distress   HEENT:   Normocephalic, atraumatic, anicteric      Neck:  Supple, symmetrical, trachea midline   Lungs:   Clear to auscultation bilaterally; no rales, rhonchi or wheezing; respirations unlabored    Heart[de-identified]   Regular rate and rhythm; no murmur, rub, or gallop  Abdomen:   Soft, non-tender, non-distended; normal bowel sounds; no masses, no organomegaly    Genitalia:   Deferred    Rectal:   Deferred    Extremities:  No cyanosis, clubbing or edema    Pulses:  2+ and symmetric    Skin:  No jaundice, rashes, or lesions    Lymph nodes:  No palpable cervical lymphadenopathy        Lab Results:   No visits with results within 1 Day(s) from this visit     Latest known visit with results is:   Admission on 01/05/2023, Discharged on 01/05/2023   Component Date Value   • WBC 01/05/2023 4 06 (L)    • RBC 01/05/2023 4 53    • Hemoglobin 01/05/2023 9 5 (L)    • Hematocrit 01/05/2023 32 1 (L)    • MCV 01/05/2023 71 (L)    • MCH 01/05/2023 21 0 (L)    • MCHC 01/05/2023 29 6 (L)    • RDW 01/05/2023 28 1 (H)    • MPV 01/05/2023 8 2 (L)    • Platelets 87/12/5060 352    • nRBC 01/05/2023 0    • Neutrophils Relative 01/05/2023 79 (H)    • Immat GRANS % 01/05/2023 0    • Lymphocytes Relative 01/05/2023 11 (L)    • Monocytes Relative 01/05/2023 7    • Eosinophils Relative 01/05/2023 2    • Basophils Relative 01/05/2023 1    • Neutrophils Absolute 01/05/2023 3 23    • Immature Grans Absolute 01/05/2023 0 01    • Lymphocytes Absolute 01/05/2023 0 44 (L)    • Monocytes Absolute 01/05/2023 0 29    • Eosinophils Absolute 01/05/2023 0 06    • Basophils Absolute 01/05/2023 0 03    • Sodium 01/05/2023 133 (L)    • Potassium 01/05/2023 4 0    • Chloride 01/05/2023 97    • CO2 01/05/2023 28    • ANION GAP 01/05/2023 8    • BUN 01/05/2023 10    • Creatinine 01/05/2023 0 62    • Glucose 01/05/2023 95    • Calcium 01/05/2023 9 9    • AST 01/05/2023 13    • ALT 01/05/2023 15    • Alkaline Phosphatase 01/05/2023 79    • Total Protein 01/05/2023 6 5    • Albumin 01/05/2023 3 9    • Total Bilirubin 01/05/2023 0 28    • eGFR 01/05/2023 96    • TSH 3RD Purvi Alex 01/05/2023 1 048    • hs TnI 0hr 01/05/2023 <2    • Color, UA 01/05/2023 Light Yellow    • Clarity, UA 01/05/2023 Clear    • Specific Gravity, UA 01/05/2023 1 009    • pH, UA 01/05/2023 7 0    • Leukocytes, UA 01/05/2023 Negative    • Nitrite, UA 01/05/2023 Negative    • Protein, UA 01/05/2023 Negative    • Glucose, UA 01/05/2023 Negative    • Ketones, UA 01/05/2023 Negative    • Urobilinogen, UA 01/05/2023 <2 0    • Bilirubin, UA 01/05/2023 Negative    • Occult Blood, UA 01/05/2023 Negative    • SARS-CoV-2 01/05/2023 Negative    • INFLUENZA A PCR 01/05/2023 Negative    • INFLUENZA B PCR 01/05/2023 Negative    • RSV PCR 01/05/2023 Negative    • Magnesium 01/05/2023 1 9    • Lipase 01/05/2023 27    • LACTIC ACID 01/05/2023 0 9    • Protime 01/05/2023 13 1    • INR 01/05/2023 0 97    • PTT 01/05/2023 26    • Blood Culture 01/05/2023 No Growth After 5 Days  • Blood Culture 01/05/2023 No Growth After 5 Days  • Ethanol Lvl 88/40/5675 <52    • Salicylate Lvl 17/82/1466 <5    • Acetaminophen Level 01/05/2023 <10 (L)    • POC Glucose 01/05/2023 99    • Ventricular Rate 01/05/2023 69    • Atrial Rate 01/05/2023 69    • WI Interval 01/05/2023 124    • QRSD Interval 01/05/2023 80    • QT Interval 01/05/2023 380    • QTC Interval 01/05/2023 407    • P Axis 01/05/2023 44    • QRS Axis 01/05/2023 25    • T Wave Axis 01/05/2023 67    • Amph/Meth UR 01/05/2023 Negative    • Barbiturate Ur 01/05/2023 Negative    • Benzodiazepine Urine 01/05/2023 Negative    • Cocaine Urine 01/05/2023 Negative    • Methadone Urine 01/05/2023 Negative    • Opiate Urine 01/05/2023 Negative    • PCP Ur 01/05/2023 Negative    • THC Urine 01/05/2023 Negative    • Oxycodone Urine 01/05/2023 Negative          Radiology Results:   XR chest 1 view portable    Result Date: 1/5/2023  Narrative: CHEST INDICATION:   vomiting  COMPARISON:  12/17/2022  EXAM PERFORMED/VIEWS:  XR CHEST PORTABLE FINDINGS: Cardiomediastinal silhouette appears unremarkable   The lungs are clear  No pneumothorax or pleural effusion  Osseous structures appear within normal limits for patient age  Impression: No acute cardiopulmonary disease  Workstation performed: [id number]     CT head without contrast    Result Date: 1/5/2023  Narrative: CT BRAIN - WITHOUT CONTRAST INDICATION:   vomiting, headache  COMPARISON:  CT 12/17/2022  TECHNIQUE:  CT examination of the brain was performed  In addition to axial images, sagittal and coronal 2D reformatted images were created and submitted for interpretation  Radiation dose length product (DLP) for this visit:  946 39 mGy-cm   This examination, like all CT scans performed in the [facility], was performed utilizing techniques to minimize radiation dose exposure, including the use of iterative  reconstruction and automated exposure control  IMAGE QUALITY:  Diagnostic  FINDINGS: PARENCHYMA: Decreased attenuation is noted in periventricular and subcortical white matter demonstrating an appearance that is statistically most likely to represent mild microangiopathic change  No CT signs of acute infarction  No intracranial mass, mass effect or midline shift  No acute parenchymal hemorrhage  VENTRICLES AND EXTRA-AXIAL SPACES:  Normal for the patient's age  VISUALIZED ORBITS AND PARANASAL SINUSES:  Normal visualized orbits  Normal visualized paranasal sinuses  CALVARIUM AND EXTRACRANIAL SOFT TISSUES:  Normal      Impression: No acute intracranial abnormality  Workstation performed: [id number]     NM gastric emptying    Result Date: 1/26/2023  Narrative: GASTRIC EMPTYING STUDY INDICATION: R11 14:  Bilious vomiting R11 2: Nausea with vomiting, unspecified COMPARISON:  CT 12/17/2022 TECHNIQUE:   The study was performed following the oral administration of 1 1 mCi Tc-99m sulfur colloid combined with scrambled eggs, as part of a standard meal   Following the meal, one minute anterior and posterior images were obtained immediately and at 0 25 hours, 0 5 hour, 1 hour, 1 5 hour, 2 hour, 3 hour and 4 hour intervals from the time of ingestion  Geometric mean analyses were then performed  FINDINGS: Gastric emptying at 0 5 hours = 9 % (N < 30%) Gastric emptying at 1 hour = 21 % (N = 10 - 70%) Gastric emptying at 2 hours = 25 % (N = > 40%) Gastric emptying at 3 hours = 28 % (N = > 70%) Gastric emptying at 4 hours = 29 % (N = >90%) Linear T-1/2 = 406 minutes     Impression: Severely delayed rate of gastric emptying   Workstation performed: [id number]

## 2023-03-01 NOTE — ASSESSMENT & PLAN NOTE
- Patient prescribed Lipitor 40 mg QD  -Recent documentation states patient endorsed she stop taking her lipitor, However currently patient stated she is taking it    - Last HDl -->69 ; Triglycerides 113 normal/regular rate and rhythm/S1 S2 present/no gallops/no rub/murmur/vascular

## 2023-03-02 ENCOUNTER — OFFICE VISIT (OUTPATIENT)
Dept: INTERNAL MEDICINE CLINIC | Facility: CLINIC | Age: 63
End: 2023-03-02

## 2023-03-02 VITALS
WEIGHT: 181.4 LBS | SYSTOLIC BLOOD PRESSURE: 138 MMHG | HEART RATE: 96 BPM | DIASTOLIC BLOOD PRESSURE: 78 MMHG | OXYGEN SATURATION: 98 % | HEIGHT: 63 IN | TEMPERATURE: 98.4 F | BODY MASS INDEX: 32.14 KG/M2

## 2023-03-02 DIAGNOSIS — F25.1 SCHIZOAFFECTIVE DISORDER, DEPRESSIVE TYPE (HCC): Primary | ICD-10-CM

## 2023-03-02 DIAGNOSIS — I10 ESSENTIAL HYPERTENSION: ICD-10-CM

## 2023-03-02 DIAGNOSIS — F99 PSYCHIATRIC DISORDER: ICD-10-CM

## 2023-03-02 DIAGNOSIS — E03.9 ACQUIRED HYPOTHYROIDISM: ICD-10-CM

## 2023-03-02 RX ORDER — QUETIAPINE 400 MG/1
400 TABLET, FILM COATED, EXTENDED RELEASE ORAL
Qty: 90 TABLET | Refills: 3 | Status: SHIPPED | OUTPATIENT
Start: 2023-03-02

## 2023-03-02 NOTE — ASSESSMENT & PLAN NOTE
Follow-up site, will refill patient's previous dose of 400 mg in the evening since this seems to be the effective dose for her

## 2023-03-02 NOTE — PATIENT INSTRUCTIONS
Problem List Items Addressed This Visit          Endocrine    Acquired hypothyroidism     Continue levothyroxine along with monitoring            Cardiovascular and Mediastinum    Essential hypertension     Controlled, continue medication along with healthy diet and exercise            Other    [facility] disorder [facility] ) - Primary     Follow-up site, will refill patient's previous dose of 400 mg in the evening since this seems to be the effective dose for her         Relevant Medications    QUEtiapine (SEROquel XR) 400 mg 24 hr tablet    Psychiatric disorder    Relevant Medications    QUEtiapine (SEROquel XR) 400 mg 24 hr tablet

## 2023-03-02 NOTE — PROGRESS NOTES
Name: Cecilio Payton      : 1960      MRN: [id number]  Encounter Provider: Mauro Cherry MD  Encounter Date: 3/2/2023   [facility] [address] [place] [address] [address]     1  Schizoaffective disorder, depressive type ([facility] )  Assessment & Plan:  Follow-up site, will refill patient's previous dose of 400 mg in the evening since this seems to be the effective dose for her    Orders:  -     QUEtiapine (SEROquel XR) 400 mg 24 hr tablet; Take 1 tablet (400 mg total) by mouth daily at bedtime    2  Psychiatric disorder  -     QUEtiapine (SEROquel XR) 400 mg 24 hr tablet; Take 1 tablet (400 mg total) by mouth daily at bedtime    3  Essential hypertension  Assessment & Plan:  Controlled, continue medication along with healthy diet and exercise      4  Acquired hypothyroidism  Assessment & Plan:  Continue levothyroxine along with monitoring           Subjective     Pt reports difficulty sleeping on the lower dose of Seroquel 150 mg, she then tried 200 mg, then increase it on her own back up to 400 which she was previously on, and she slept well at that dose    Review of Systems   Constitutional: Negative for chills, fatigue and fever  HENT: Negative for congestion, nosebleeds, postnasal drip, sore throat and trouble swallowing  Eyes: Negative for pain  Respiratory: Negative for cough, chest tightness, shortness of breath and wheezing  Cardiovascular: Negative for chest pain, palpitations and leg swelling  Gastrointestinal: Negative for abdominal pain, constipation, diarrhea, nausea and vomiting  Endocrine: Negative for polydipsia and polyuria  Genitourinary: Negative for dysuria, flank pain and hematuria  Musculoskeletal: Negative for arthralgias  Skin: Negative for rash  Neurological: Negative for dizziness, tremors and headaches  Hematological: Does not bruise/bleed easily  Psychiatric/Behavioral: Negative for confusion and dysphoric mood   The patient is not nervous/anxious  Past Medical History:   Diagnosis Date   • Anxiety    • Arthritis    • Back pain at L4-L5 level    • Schreiber esophagus    • Bulimia    • Colon polyp    • Disease of thyroid gland     hypothyroid   • Ear problems    • GERD (gastroesophageal reflux disease)    • History of transfusion    • Hyperlipidemia    • Hypertension    • Hypothyroidism    • Leukopenia    • Nasal congestion    • NMS (neuroleptic malignant syndrome) 01/03/2020   • Pneumonia    • Rheumatoid arthritis involving right hip (HCC)    • Sciatica    • Seizure ([address] )     due to medication mix up 8/2018 only one historically   • Seizures (HCC)    • Synovial cyst of lumbar spine    • Vertigo    • Weight gain      Past Surgical History:   Procedure Laterality Date   • BONE MARROW BIOPSY     • BREAST SURGERY      enlargement with implants   • CLOSED REDUCTION DISTAL FEMUR FRACTURE     • COLONOSCOPY     • COSMETIC SURGERY     • DENTAL SURGERY     • HIP ARTHROPLASTY Right 1/2/2020    Procedure: REVISION TOTAL HIP ARTHROPLASTY WITH REPAIR OF PARIPROSTHETIC FRACTURE - POSTERIOR APPROACH;  Surgeon: Anson Ribeiro MD;  [facility];  Service: Orthopedics   • AL ARTHRP ACETBLR/PROX FEM PROSTC AGRFT/ALGRFT Right 12/11/2019    Procedure: ARTHROPLASTY HIP TOTAL ANTERIOR;  Surgeon: Anson Ribeiro MD;  [facility];  Service: Orthopedics   • AL ESOPHAGOGASTRODUODENOSCOPY TRANSORAL DIAGNOSTIC N/A 5/11/2021    Procedure: ESOPHAGOGASTRODUODENOSCOPY (EGD); Surgeon: Laura Dyson MD;  [facility];  Service: General   • AL LAPS RPR PARAESPHGL HRNA INCL FUNDPLSTY [address] N/A 5/11/2021    Procedure: REPAIR HERNIA PARAESOPHAGEAL  LAPAROSCOPIC;  Surgeon:  Laura Dyson MD;  [facility];  Service: General   • AL UNLISTED PROCEDURE ESOPHAGUS N/A 5/11/2021    Procedure: FUNDOPLICATION TRANSORAL INCISIONLESS;  Surgeon: Cee Lynne MD;  [facility];  Service: Gastroenterology   • RHINOPLASTY     • SINUS SURGERY • TRANSORAL INCISIONLESS FUNDOPLICATION (TIF)  02/86/4755     Family History   Problem Relation Age of Onset   • Uterine cancer Mother    • Esophageal cancer Father    • Colon cancer Maternal Grandmother      Social History     Socioeconomic History   • Marital status: Single     Spouse name: None   • Number of children: None   • Years of education: None   • Highest education level: None   Occupational History   • None   Tobacco Use   • Smoking status: Never   • Smokeless tobacco: Never   Vaping Use   • Vaping Use: Never used   Substance and Sexual Activity   • Alcohol use: Not Currently   • Drug use: Not Currently   • Sexual activity: Not Currently   Other Topics Concern   • None   Social History Narrative    [facility] member [facility]     Social Determinants of Health     Financial Resource Strain: Medium Risk   • Difficulty of Paying Living Expenses: Somewhat hard   Food Insecurity: No Food Insecurity   • Worried About Running Out of Food in the Last Year: Never true   • Ran Out of Food in the Last Year: Never true   Transportation Needs: No Transportation Needs   • Lack of Transportation (Medical): No   • Lack of Transportation (Non-Medical): No   Physical Activity: Not on file   Stress: Not on file   Social Connections: Not on file   Intimate Partner Violence: Not on file   Housing Stability: Low Risk    • Unable to Pay for Housing in the Last Year: No   • Number of Places Lived in the Last Year: 1   • Unstable Housing in the Last Year: No     Current Outpatient Medications on File Prior to Visit   Medication Sig   • amLODIPine (NORVASC) 5 mg tablet TAKE 1 TABLET (5 MG TOTAL) BY MOUTH DAILY     • ferrous sulfate 325 (65 Fe) mg tablet Take 1 tablet (325 mg total) by mouth 2 (two) times a day with meals   • levothyroxine 25 mcg tablet TAKE 1 TABLET BY MOUTH EVERY DAY   • ondansetron (ZOFRAN) 4 mg tablet Take 1 tablet (4 mg total) by mouth every 8 (eight) hours as needed for nausea or vomiting   • pantoprazole (PROTONIX) 40 mg tablet Take 1 tablet (40 mg total) by mouth 2 (two) times a day   • PARoxetine (PAXIL) 30 mg tablet TAKE 2 TABLETS (60MG TOTAL) BY MOUTH DAILY   • sucralfate (CARAFATE) 1 g tablet TAKE 1 TABLET (1 G TOTAL) BY MOUTH 3 (THREE) TIMES A DAY WITH MEALS   • triamcinolone (KENALOG) 0 1 % cream For acute flares, apply topically twice daily for up to 2 weeks at a time and then take one week off  DO NOT use on the face, armpits, and groin area  When not flaring, can use 1-2 times weekly on areas prone to rash  (Patient taking differently: if needed For acute flares, apply topically twice daily for up to 2 weeks at a time and then take one week off  DO NOT use on the face, armpits, and groin area   When not flaring, can use 1-2 times weekly on areas prone to rash )   • ziprasidone (GEODON) 80 mg capsule TAKE 1 CAPSULE BY MOUTH EVERY DAY   • [DISCONTINUED] QUEtiapine (SEROquel XR) 150 mg 24 hr tablet TAKE 1 TABLET BY MOUTH AT BEDTIME   • LORazepam (ATIVAN) 2 mg tablet Take 1 tablet (2 mg total) by mouth every 6 (six) hours as needed for anxiety for up to 10 days   • prochlorperazine (COMPAZINE) 10 mg tablet Take 1 tablet (10 mg total) by mouth every 6 (six) hours as needed for nausea or vomiting (Patient not taking: Reported on 1/31/2023)     Allergies   Allergen Reactions   • Risperdal [Risperidone] Shortness Of Breath     Rapid heart beat, SOB   • Zyprexa [Olanzapine] Shortness Of Breath     Rapid heartbeat   • Latex Rash     Band aids, adhesives wears normal underwear, can eat bananas  USE PAPER TAPE     Immunization History   Administered Date(s) Administered   • COVID-19 PFIZER VACCINE 0 3 ML IM 03/20/2021, 04/10/2021, 10/09/2021   • H1N1, All Formulations 11/17/2009   • INFLUENZA 11/17/2009, 01/04/2013, 11/11/2014, 10/20/2015, 10/31/2016, 09/16/2018   • Influenza Injectable, MDCK, Preservative Free, Quadrivalent, 0 5 mL 09/26/2019   • Influenza Quadrivalent, 6-35 Months IM 10/31/2016   • Influenza, injectable, quadrivalent, preservative free 0 5 mL 09/05/2020   • Influenza, recombinant, quadrivalent,injectable, preservative free 11/04/2021   • Pneumococcal Conjugate 13-Valent 09/16/2018   • Rabies 07/29/2014, 08/01/2014, 08/05/2014   • Tdap 07/29/2014, 12/17/2022       Objective     /78   Pulse (!) 108   Temp 98 4 °F (36 9 °C) (Tympanic)   Ht 5' 2 68" (1 592 m)   Wt 82 3 kg (181 lb 6 4 oz)   SpO2 98%   BMI 32 47 kg/m²     Physical Exam  Vitals reviewed  Constitutional:       General: She is not in acute distress  Appearance: She is well-developed  HENT:      Head: Normocephalic and atraumatic  Right Ear: External ear normal       Left Ear: External ear normal    Eyes:      General: No scleral icterus  Conjunctiva/sclera: Conjunctivae normal    Neck:      Thyroid: No thyromegaly  Trachea: No tracheal deviation  Cardiovascular:      Rate and Rhythm: Normal rate and regular rhythm  Heart sounds: Normal heart sounds  No murmur heard  Pulmonary:      Effort: Pulmonary effort is normal  No respiratory distress  Breath sounds: Normal breath sounds  No wheezing or rales  Musculoskeletal:      Cervical back: Normal range of motion and neck supple  Right lower leg: No edema  Left lower leg: No edema  Lymphadenopathy:      Cervical: No cervical adenopathy  Skin:     Coloration: Skin is not jaundiced  Neurological:      Mental Status: She is alert         Romina Paredes MD

## 2023-03-05 PROBLEM — Z00.00 MEDICARE ANNUAL WELLNESS VISIT, SUBSEQUENT: Status: RESOLVED | Noted: 2020-04-29 | Resolved: 2023-03-05

## 2023-03-11 ENCOUNTER — TELEPHONE (OUTPATIENT)
Dept: OTHER | Facility: OTHER | Age: 63
End: 2023-03-11

## 2023-03-11 NOTE — TELEPHONE ENCOUNTER
Pt's mother called in to see if pt called into her PCP's office today  She says the pt is missing  I informed her that there aren't any calls recorded in the pt's chart for today

## 2023-03-24 ENCOUNTER — CONSULT (OUTPATIENT)
Dept: INTERNAL MEDICINE CLINIC | Facility: CLINIC | Age: 63
End: 2023-03-24

## 2023-03-24 VITALS
HEIGHT: 63 IN | SYSTOLIC BLOOD PRESSURE: 126 MMHG | BODY MASS INDEX: 32.96 KG/M2 | WEIGHT: 186 LBS | HEART RATE: 96 BPM | OXYGEN SATURATION: 99 % | DIASTOLIC BLOOD PRESSURE: 70 MMHG

## 2023-03-24 DIAGNOSIS — Z12.4 SCREENING FOR CERVICAL CANCER: ICD-10-CM

## 2023-03-24 DIAGNOSIS — Z01.818 PREOP EXAM FOR INTERNAL MEDICINE: Primary | ICD-10-CM

## 2023-03-24 DIAGNOSIS — L97.529: Primary | ICD-10-CM

## 2023-03-24 DIAGNOSIS — E03.9 ACQUIRED HYPOTHYROIDISM: ICD-10-CM

## 2023-03-24 DIAGNOSIS — D50.9 IRON DEFICIENCY ANEMIA, UNSPECIFIED IRON DEFICIENCY ANEMIA TYPE: ICD-10-CM

## 2023-03-24 DIAGNOSIS — R11.0 NAUSEA: ICD-10-CM

## 2023-03-24 RX ORDER — ONDANSETRON 4 MG/1
4 TABLET, FILM COATED ORAL EVERY 8 HOURS PRN
Qty: 20 TABLET | Refills: 5 | Status: SHIPPED | OUTPATIENT
Start: 2023-03-24

## 2023-03-24 NOTE — PROGRESS NOTES
Name: Antonio Cunningham      : 1960      MRN: [id number]  Encounter Provider: Maci Craig MD  Encounter Date: 3/24/2023   [facility] [address] [place] [address] [address]     1  Preop exam for internal medicine  Assessment & Plan:  Patient is medically cleared for foot surgery, no cardiopulmonary complaints, exam today is okay  2  Screening for cervical cancer    3  Nausea  -     ondansetron (ZOFRAN) 4 mg tablet; Take 1 tablet (4 mg total) by mouth every 8 (eight) hours as needed for nausea or vomiting    4  Acquired hypothyroidism  Assessment & Plan:  Continue levothyroxine along with monitoring      5  Iron deficiency anemia, unspecified iron deficiency anemia type  Assessment & Plan:  Continue iron supplement             Subjective     Patient here for preop medical clearance for foot surgery  No cardiopulmonary complaints, no chest pain, no shortness of breath, no lightheadedness  No problems with anesthesia in the past     Review of Systems   Constitutional: Negative for chills, fatigue and fever  HENT: Negative for congestion, nosebleeds, postnasal drip, sore throat and trouble swallowing  Eyes: Negative for pain  Respiratory: Negative for cough, chest tightness, shortness of breath and wheezing  Cardiovascular: Negative for chest pain, palpitations and leg swelling  Gastrointestinal: Negative for abdominal pain, constipation, diarrhea, nausea and vomiting  Endocrine: Negative for polydipsia and polyuria  Genitourinary: Negative for dysuria, flank pain and hematuria  Musculoskeletal: Negative for arthralgias  Skin: Negative for rash  Neurological: Negative for dizziness, tremors and headaches  Hematological: Does not bruise/bleed easily  Psychiatric/Behavioral: Negative for confusion and dysphoric mood  The patient is not nervous/anxious          Past Medical History:   Diagnosis Date   • Anxiety    • Arthritis    • Back pain at L4-L5 level    • Schreiber esophagus    • Bulimia    • Colon polyp    • Disease of thyroid gland     hypothyroid   • Ear problems    • GERD (gastroesophageal reflux disease)    • History of transfusion    • Hyperlipidemia    • Hypertension    • Hypothyroidism    • Leukopenia    • Nasal congestion    • NMS (neuroleptic malignant syndrome) 01/03/2020   • Pneumonia    • Rheumatoid arthritis involving right hip (HCC)    • Sciatica    • Seizure ([address] )     due to medication mix up 8/2018 only one historically   • Seizures (HCC)    • Synovial cyst of lumbar spine    • Vertigo    • Weight gain      Past Surgical History:   Procedure Laterality Date   • BONE MARROW BIOPSY     • BREAST SURGERY      enlargement with implants   • CLOSED REDUCTION DISTAL FEMUR FRACTURE     • COLONOSCOPY     • COSMETIC SURGERY     • DENTAL SURGERY     • HIP ARTHROPLASTY Right 1/2/2020    Procedure: REVISION TOTAL HIP ARTHROPLASTY WITH REPAIR OF PARIPROSTHETIC FRACTURE - POSTERIOR APPROACH;  Surgeon: Sony Soriano MD;  [facility];  Service: Orthopedics   • ID ARTHRP ACETBLR/PROX FEM PROSTC AGRFT/ALGRFT Right 12/11/2019    Procedure: ARTHROPLASTY HIP TOTAL ANTERIOR;  Surgeon: Sony Soriano MD;  [facility];  Service: Orthopedics   • ID ESOPHAGOGASTRODUODENOSCOPY TRANSORAL DIAGNOSTIC N/A 5/11/2021    Procedure: ESOPHAGOGASTRODUODENOSCOPY (EGD); Surgeon: Mir Saldana MD;  [facility];  Service: General   • ID LAPS RPR PARAESPHGL HRNA INCL FUNDPLSTY [facility] N/A 5/11/2021    Procedure: REPAIR HERNIA PARAESOPHAGEAL  LAPAROSCOPIC;  Surgeon:  Mir Saldana MD;  [facility];  Service: General   • ID UNLISTED PROCEDURE ESOPHAGUS N/A 5/11/2021    Procedure: FUNDOPLICATION TRANSORAL INCISIONLESS;  Surgeon: Tawanna Barroso MD;  [facility];  Service: Gastroenterology   • RHINOPLASTY     • SINUS SURGERY     • TRANSORAL INCISIONLESS FUNDOPLICATION (TIF)  20/38/0904     Family History   Problem Relation Age of Onset   • Uterine cancer Mother    • Esophageal cancer Father    • Colon cancer Maternal Grandmother      Social History     Socioeconomic History   • Marital status: Single     Spouse name: None   • Number of children: None   • Years of education: None   • Highest education level: None   Occupational History   • None   Tobacco Use   • Smoking status: Never   • Smokeless tobacco: Never   Vaping Use   • Vaping Use: Never used   Substance and Sexual Activity   • Alcohol use: Not Currently   • Drug use: Not Currently   • Sexual activity: Not Currently   Other Topics Concern   • None   Social History Narrative    Family disruption death of family member parent, per allscripts     Social Determinants of Health     Financial Resource Strain: Medium Risk   • Difficulty of Paying Living Expenses: Somewhat hard   Food Insecurity: No Food Insecurity   • Worried About Running Out of Food in the Last Year: Never true   • Ran Out of Food in the Last Year: Never true   Transportation Needs: No Transportation Needs   • Lack of Transportation (Medical): No   • Lack of Transportation (Non-Medical): No   Physical Activity: Not on file   Stress: Not on file   Social Connections: Not on file   Intimate Partner Violence: Not on file   Housing Stability: Low Risk    • Unable to Pay for Housing in the Last Year: No   • Number of Places Lived in the Last Year: 1   • Unstable Housing in the Last Year: No     Current Outpatient Medications on File Prior to Visit   Medication Sig   • amLODIPine (NORVASC) 5 mg tablet TAKE 1 TABLET (5 MG TOTAL) BY MOUTH DAILY     • ferrous sulfate 325 (65 Fe) mg tablet Take 1 tablet (325 mg total) by mouth 2 (two) times a day with meals   • levothyroxine 25 mcg tablet TAKE 1 TABLET BY MOUTH EVERY DAY   • pantoprazole (PROTONIX) 40 mg tablet Take 1 tablet (40 mg total) by mouth 2 (two) times a day   • PARoxetine (PAXIL) 30 mg tablet TAKE 2 TABLETS (60MG TOTAL) BY MOUTH DAILY   • QUEtiapine (SEROquel XR) 400 mg 24 hr tablet Take 1 tablet (400 mg total) by mouth daily at bedtime   • sucralfate (CARAFATE) 1 g tablet TAKE 1 TABLET (1 G TOTAL) BY MOUTH 3 (THREE) TIMES A DAY WITH MEALS   • triamcinolone (KENALOG) 0 1 % cream For acute flares, apply topically twice daily for up to 2 weeks at a time and then take one week off  DO NOT use on the face, armpits, and groin area  When not flaring, can use 1-2 times weekly on areas prone to rash  (Patient taking differently: if needed For acute flares, apply topically twice daily for up to 2 weeks at a time and then take one week off  DO NOT use on the face, armpits, and groin area   When not flaring, can use 1-2 times weekly on areas prone to rash )   • ziprasidone (GEODON) 80 mg capsule TAKE 1 CAPSULE BY MOUTH EVERY DAY   • [DISCONTINUED] ondansetron (ZOFRAN) 4 mg tablet Take 1 tablet (4 mg total) by mouth every 8 (eight) hours as needed for nausea or vomiting   • LORazepam (ATIVAN) 2 mg tablet Take 1 tablet (2 mg total) by mouth every 6 (six) hours as needed for anxiety for up to 10 days   • prochlorperazine (COMPAZINE) 10 mg tablet Take 1 tablet (10 mg total) by mouth every 6 (six) hours as needed for nausea or vomiting (Patient not taking: Reported on 1/31/2023)     Allergies   Allergen Reactions   • Risperdal [Risperidone] Shortness Of Breath     Rapid heart beat, SOB   • Zyprexa [Olanzapine] Shortness Of Breath     Rapid heartbeat   • Latex Rash     Band aids, adhesives wears normal underwear, can eat bananas  USE PAPER TAPE     Immunization History   Administered Date(s) Administered   • COVID-19 PFIZER VACCINE 0 3 ML IM 03/20/2021, 04/10/2021, 10/09/2021   • H1N1, All Formulations 11/17/2009   • INFLUENZA 11/17/2009, 01/04/2013, 11/11/2014, 10/20/2015, 10/31/2016, 09/16/2018   • Influenza Injectable, MDCK, Preservative Free, Quadrivalent, 0 5 mL 09/26/2019   • Influenza Quadrivalent, 6-35 Months IM 10/31/2016   • Influenza, injectable, quadrivalent, preservative free 0 5 mL 09/05/2020   • Influenza, recombinant, quadrivalent,injectable, preservative free 11/04/2021   • Pneumococcal Conjugate 13-Valent 09/16/2018   • Rabies 07/29/2014, 08/01/2014, 08/05/2014   • Tdap 07/29/2014, 12/17/2022       Objective     /70 (BP Location: Left arm, Patient Position: Sitting, Cuff Size: Standard)   Pulse 96   Ht 5' 2 68" (1 592 m)   Wt 84 4 kg (186 lb)   SpO2 99%   BMI 33 29 kg/m²     Physical Exam  Constitutional:       General: She is not in acute distress  Cardiovascular:      Rate and Rhythm: Normal rate and regular rhythm  Heart sounds: Normal heart sounds  No murmur heard  Pulmonary:      Effort: Pulmonary effort is normal  No respiratory distress  Breath sounds: Normal breath sounds  No wheezing, rhonchi or rales  Musculoskeletal:      Right lower leg: No edema  Left lower leg: No edema  Neurological:      Mental Status: She is alert         Ele Simmons MD

## 2023-03-24 NOTE — LETTER
2023     Mae Padilla, [facility] [address]    Patient: Tameka Oliver   YOB: 1960   Date of Visit: 3/24/2023       Dear Dr Isidoro Lindsey: Thank you for referring Tameka Oliver to me for evaluation  Below are my notes for this consultation  If you have questions, please do not hesitate to call me  I look forward to following your patient along with you  Sincerely,        Garrett Negron MD        CC: No Recipients  Garrett Negron MD  3/24/2023  9:30 AM  Incomplete  Name: Tameka Oliver      : 1960      MRN: [id number]  Encounter Provider: Garrett Negron MD  Encounter Date: 3/24/2023   [facility] [address]     1  Preop exam for internal medicine  Assessment & Plan:  Patient is medically cleared for foot surgery, no cardiopulmonary complaints, exam today is okay  2  Screening for cervical cancer    3  Nausea  -     ondansetron (ZOFRAN) 4 mg tablet; Take 1 tablet (4 mg total) by mouth every 8 (eight) hours as needed for nausea or vomiting    4  Acquired hypothyroidism  Assessment & Plan:  Continue levothyroxine along with monitoring      5  Iron deficiency anemia, unspecified iron deficiency anemia type  Assessment & Plan:  Continue iron supplement           Subjective     Patient here for preop medical clearance for foot surgery  No cardiopulmonary complaints, no chest pain, no shortness of breath, no lightheadedness  No problems with anesthesia in the past     Review of Systems   Constitutional: Negative for chills, fatigue and fever  HENT: Negative for congestion, nosebleeds, postnasal drip, sore throat and trouble swallowing  Eyes: Negative for pain  Respiratory: Negative for cough, chest tightness, shortness of breath and wheezing  Cardiovascular: Negative for chest pain, palpitations and leg swelling     Gastrointestinal: Negative for abdominal pain, constipation, diarrhea, nausea and vomiting  Endocrine: Negative for polydipsia and polyuria  Genitourinary: Negative for dysuria, flank pain and hematuria  Musculoskeletal: Negative for arthralgias  Skin: Negative for rash  Neurological: Negative for dizziness, tremors and headaches  Hematological: Does not bruise/bleed easily  Psychiatric/Behavioral: Negative for confusion and dysphoric mood  The patient is not nervous/anxious  Past Medical History:   Diagnosis Date   • Anxiety    • Arthritis    • Back pain at L4-L5 level    • Schreiber esophagus    • Bulimia    • Colon polyp    • Disease of thyroid gland     hypothyroid   • Ear problems    • GERD (gastroesophageal reflux disease)    • History of transfusion    • Hyperlipidemia    • Hypertension    • Hypothyroidism    • Leukopenia    • Nasal congestion    • NMS (neuroleptic malignant syndrome) 01/03/2020   • Pneumonia    • Rheumatoid arthritis involving right hip (HCC)    • Sciatica    • Seizure ([address] )     due to medication mix up 8/2018 only one historically   • Seizures (HCC)    • Synovial cyst of lumbar spine    • Vertigo    • Weight gain      Past Surgical History:   Procedure Laterality Date   • BONE MARROW BIOPSY     • BREAST SURGERY      enlargement with implants   • CLOSED REDUCTION DISTAL FEMUR FRACTURE     • COLONOSCOPY     • COSMETIC SURGERY     • DENTAL SURGERY     • HIP ARTHROPLASTY Right 1/2/2020    Procedure: REVISION TOTAL HIP ARTHROPLASTY WITH REPAIR OF PARIPROSTHETIC FRACTURE - POSTERIOR APPROACH;  Surgeon: Lilia Childers MD;  [facility];  Service: Orthopedics   • AR ARTHRP ACETBLR/PROX FEM PROSTC AGRFT/ALGRFT Right 12/11/2019    Procedure: ARTHROPLASTY HIP TOTAL ANTERIOR;  Surgeon: Lilia Childers MD;  [facility];  Service: Orthopedics   • AR ESOPHAGOGASTRODUODENOSCOPY TRANSORAL DIAGNOSTIC N/A 5/11/2021    Procedure: ESOPHAGOGASTRODUODENOSCOPY (EGD); Surgeon:  Lázaor Palmer MD;  [facility]; Service: General   • UT LAPS RPR PARAESPHGL HRNA INCL FUNDPLSTY W/MESH N/A 5/11/2021    Procedure: REPAIR HERNIA PARAESOPHAGEAL  LAPAROSCOPIC;  Surgeon: Brisa Campos MD;  [facility];  Service: General   • UT UNLISTED PROCEDURE ESOPHAGUS N/A 5/11/2021    Procedure: FUNDOPLICATION TRANSORAL INCISIONLESS;  Surgeon: Andrews Smith MD;  [facility];  Service: Gastroenterology   • RHINOPLASTY     • SINUS SURGERY     • TRANSORAL INCISIONLESS FUNDOPLICATION (TIF)  06/01/7492     Family History   Problem Relation Age of Onset   • Uterine cancer Mother    • Esophageal cancer Father    • Colon cancer Maternal Grandmother      Social History     Socioeconomic History   • Marital status: Single     Spouse name: None   • Number of children: None   • Years of education: None   • Highest education level: None   Occupational History   • None   Tobacco Use   • Smoking status: Never   • Smokeless tobacco: Never   Vaping Use   • Vaping Use: Never used   Substance and Sexual Activity   • Alcohol use: Not Currently   • Drug use: Not Currently   • Sexual activity: Not Currently   Other Topics Concern   • None   Social History Narrative    Family disruption death of family member parent, per allscripts     Social Determinants of Health     Financial Resource Strain: Medium Risk   • Difficulty of Paying Living Expenses: Somewhat hard   Food Insecurity: No Food Insecurity   • Worried About Running Out of Food in the Last Year: Never true   • Ran Out of Food in the Last Year: Never true   Transportation Needs: No Transportation Needs   • Lack of Transportation (Medical): No   • Lack of Transportation (Non-Medical):  No   Physical Activity: Not on file   Stress: Not on file   Social Connections: Not on file   Intimate Partner Violence: Not on file   Housing Stability: Low Risk    • Unable to Pay for Housing in the Last Year: No   • Number of Places Lived in the Last Year: 1   • Unstable Housing in the Last Year: No Current Outpatient Medications on File Prior to Visit   Medication Sig   • amLODIPine (NORVASC) 5 mg tablet TAKE 1 TABLET (5 MG TOTAL) BY MOUTH DAILY  • ferrous sulfate 325 (65 Fe) mg tablet Take 1 tablet (325 mg total) by mouth 2 (two) times a day with meals   • levothyroxine 25 mcg tablet TAKE 1 TABLET BY MOUTH EVERY DAY   • pantoprazole (PROTONIX) 40 mg tablet Take 1 tablet (40 mg total) by mouth 2 (two) times a day   • PARoxetine (PAXIL) 30 mg tablet TAKE 2 TABLETS (60MG TOTAL) BY MOUTH DAILY   • QUEtiapine (SEROquel XR) 400 mg 24 hr tablet Take 1 tablet (400 mg total) by mouth daily at bedtime   • sucralfate (CARAFATE) 1 g tablet TAKE 1 TABLET (1 G TOTAL) BY MOUTH 3 (THREE) TIMES A DAY WITH MEALS   • triamcinolone (KENALOG) 0 1 % cream For acute flares, apply topically twice daily for up to 2 weeks at a time and then take one week off  DO NOT use on the face, armpits, and groin area  When not flaring, can use 1-2 times weekly on areas prone to rash  (Patient taking differently: if needed For acute flares, apply topically twice daily for up to 2 weeks at a time and then take one week off  DO NOT use on the face, armpits, and groin area   When not flaring, can use 1-2 times weekly on areas prone to rash )   • ziprasidone (GEODON) 80 mg capsule TAKE 1 CAPSULE BY MOUTH EVERY DAY   • [DISCONTINUED] ondansetron (ZOFRAN) 4 mg tablet Take 1 tablet (4 mg total) by mouth every 8 (eight) hours as needed for nausea or vomiting   • LORazepam (ATIVAN) 2 mg tablet Take 1 tablet (2 mg total) by mouth every 6 (six) hours as needed for anxiety for up to 10 days   • prochlorperazine (COMPAZINE) 10 mg tablet Take 1 tablet (10 mg total) by mouth every 6 (six) hours as needed for nausea or vomiting (Patient not taking: Reported on 1/31/2023)     Allergies   Allergen Reactions   • Risperdal [Risperidone] Shortness Of Breath     Rapid heart beat, SOB   • Zyprexa [Olanzapine] Shortness Of Breath     Rapid heartbeat   • Latex Rash     Band aids, adhesives wears normal underwear, can eat bananas  USE PAPER TAPE     Immunization History   Administered Date(s) Administered   • COVID-19 PFIZER VACCINE 0 3 ML IM 03/20/2021, 04/10/2021, 10/09/2021   • H1N1, All Formulations 11/17/2009   • INFLUENZA 11/17/2009, 01/04/2013, 11/11/2014, 10/20/2015, 10/31/2016, 09/16/2018   • Influenza Injectable, MDCK, Preservative Free, Quadrivalent, 0 5 mL 09/26/2019   • Influenza Quadrivalent, 6-35 Months IM 10/31/2016   • Influenza, injectable, quadrivalent, preservative free 0 5 mL 09/05/2020   • Influenza, recombinant, quadrivalent,injectable, preservative free 11/04/2021   • Pneumococcal Conjugate 13-Valent 09/16/2018   • Rabies 07/29/2014, 08/01/2014, 08/05/2014   • Tdap 07/29/2014, 12/17/2022       Objective     /70 (BP Location: Left arm, Patient Position: Sitting, Cuff Size: Standard)   Pulse 96   Ht 5' 2 68" (1 592 m)   Wt 84 4 kg (186 lb)   SpO2 99%   BMI 33 29 kg/m²     Physical Exam  Constitutional:       General: She is not in acute distress  Cardiovascular:      Rate and Rhythm: Normal rate and regular rhythm  Heart sounds: Normal heart sounds  No murmur heard  Pulmonary:      Effort: Pulmonary effort is normal  No respiratory distress  Breath sounds: Normal breath sounds  No wheezing, rhonchi or rales  Musculoskeletal:      Right lower leg: No edema  Left lower leg: No edema  Neurological:      Mental Status: She is alert         Garrett Negron MD

## 2023-03-24 NOTE — PATIENT INSTRUCTIONS
Problem List Items Addressed This Visit          Endocrine    Acquired hypothyroidism     Continue levothyroxine along with monitoring            Other    Preop exam for internal medicine - Primary     Patient is medically cleared for foot surgery, no cardiopulmonary complaints, exam today is okay           Iron deficiency anemia     Continue iron supplement          Other Visit Diagnoses       Screening for cervical cancer        Nausea        Relevant Medications    ondansetron (ZOFRAN) 4 mg tablet

## 2023-03-24 NOTE — PROGRESS NOTES
Pt presented to office requesting 2 more surgical shoes  Dispensed and [address] form signed by pt

## 2023-03-25 DIAGNOSIS — K22.10 EROSIVE ESOPHAGITIS: ICD-10-CM

## 2023-03-25 RX ORDER — SUCRALFATE 1 G/1
1 TABLET ORAL
Qty: 270 TABLET | Refills: 3 | Status: SHIPPED | OUTPATIENT
Start: 2023-03-25

## 2023-03-28 ENCOUNTER — PATIENT OUTREACH (OUTPATIENT)
Dept: INTERNAL MEDICINE CLINIC | Facility: CLINIC | Age: 63
End: 2023-03-28

## 2023-03-28 NOTE — PROGRESS NOTES
SW responding to voicemail received from patient indicating that her mother was placed at [facility] and patient is having problems with mother  Patient was not available at time of call and message was left with reason for phone call and this SW contact number  Awaiting return call from patient

## 2023-04-02 DIAGNOSIS — E03.9 ACQUIRED HYPOTHYROIDISM: ICD-10-CM

## 2023-04-02 RX ORDER — LEVOTHYROXINE SODIUM 0.03 MG/1
TABLET ORAL
Qty: 90 TABLET | Refills: 3 | Status: SHIPPED | OUTPATIENT
Start: 2023-04-02

## 2023-04-03 ENCOUNTER — ANESTHESIA EVENT (OUTPATIENT)
Dept: PERIOP | Facility: HOSPITAL | Age: 63
End: 2023-04-03

## 2023-04-06 DIAGNOSIS — F41.9 ANXIETY: ICD-10-CM

## 2023-04-06 RX ORDER — LORAZEPAM 2 MG/1
2 TABLET ORAL EVERY 6 HOURS PRN
Qty: 120 TABLET | Refills: 1 | Status: SHIPPED | OUTPATIENT
Start: 2023-04-06

## 2023-04-06 NOTE — PRE-PROCEDURE INSTRUCTIONS
Pre-Surgery Instructions:   Medication Instructions   • amLODIPine (NORVASC) 5 mg tablet Take day of surgery  • ferrous sulfate 325 (65 Fe) mg tablet Hold day of surgery  • levothyroxine 25 mcg tablet Take day of surgery  • LORazepam (ATIVAN) 2 mg tablet Uses PRN- OK to take day of surgery   • ondansetron (ZOFRAN) 4 mg tablet Uses PRN- OK to take day of surgery   • pantoprazole (PROTONIX) 40 mg tablet Take day of surgery  • PARoxetine (PAXIL) 30 mg tablet Take day of surgery  • QUEtiapine (SEROquel XR) 400 mg 24 hr tablet Take night before surgery   • sucralfate (CARAFATE) 1 g tablet Hold day of surgery  • triamcinolone (KENALOG) 0 1 % cream Hold day of surgery  • ziprasidone (GEODON) 80 mg capsule Take day of surgery  Medication instructions for day surgery reviewed  Please use only a sip of water to take your instructed medications  Avoid all over the counter vitamins, supplements and NSAIDS for one week prior to surgery per anesthesia guidelines  Tylenol is ok to take as needed  You will receive a call one business day prior to surgery with an arrival time and hospital directions  If your surgery is scheduled on a Monday, the hospital will be calling you on the Friday prior to your surgery  If you have not heard from anyone by 8pm, please call the hospital supervisor through the hospital  at [phone]  Lesa Leidy [phone])  Do not eat or drink anything after midnight the night before your surgery, including candy, mints, lifesavers, or chewing gum  Do not drink alcohol 24hrs before your surgery  Try not to smoke at least 24hrs before your surgery  Follow the pre surgery showering instructions as listed in the [facility] Surgical Experience Booklet” or otherwise provided by your surgeon's office  Do not shave the surgical area 24 hours before surgery  Do not apply any lotions, creams, including makeup, cologne, deodorant, or perfumes after showering on the day of your surgery  No contact lenses, eye make-up, or artificial eyelashes  Remove nail polish, including gel polish, and any artificial, gel, or acrylic nails if possible  Remove all jewelry including rings and body piercing jewelry  Wear causal clothing that is easy to take on and off  Consider your type of surgery  Keep any valuables, jewelry, piercings at home  Please bring any specially ordered equipment (sling, braces) if indicated  Arrange for a responsible person to drive you to and from the hospital on the day of your surgery  Visitor Guidelines discussed  Call the surgeon's office with any new illnesses, exposures, or additional questions prior to surgery  Please reference your [facility] Surgical Experience Booklet” for additional information to prepare for your upcoming surgery

## 2023-04-06 NOTE — DISCHARGE INSTR - AVS FIRST PAGE
Dr Flora Alanis Instructions    1  Take your prescribed medication as directed  2  Upon arrival at home, lie down and elevate your surgical foot on 2 pillows  3  Stay off your feet as much as possible for the first 24-48 hours  This is when your feet first swell and may become painful  After 48 hours you may begin limited walking following these restrictions:   Weight-bearing as tolerated in surgical shoe    4  Drink large quantities of water  Consume no alcohol  Continue a well-balanced diet  5  Report any unusual discomfort or fever to this office  6  A limited amount of discomfort and swelling is to be expected  In some cases the skin may take on a bruised appearance  The surgical cleansing solution that was applied to your foot prior to the operation is dark in color and the operation site may appear to be oozing when it actually is not  7  A slight amount of blood is to be expected, and is no cause for alarm  Do not remove the dressings  If there is active bleeding and if the bleeding persists, add additional gauze to the bandage, apply direct pressure, elevate your feet and call this office  8  Do not get the dressings wet  As regular bathing may be inconvenient, sponge baths are recommended  9  When anesthesia wears off and if any discomfort should be present, apply an ice pack directly over the operated area for 15 minute intervals for several hours or until the pain leaves  (USE IN EXCESS OF 15 MINUTES COULD CAUSE FROSTBITE)  Do not use hot water bags or electric pads  A convenient icepack can be made by placing ice cubes in a plastic bag and covering this with a towel  10  Take over-the-counter laxative for constipation, this is common with use of narcotic medications

## 2023-04-07 ENCOUNTER — HOSPITAL ENCOUNTER (OUTPATIENT)
Facility: HOSPITAL | Age: 63
Setting detail: OUTPATIENT SURGERY
Discharge: HOME | End: 2023-04-07
Attending: PODIATRIST | Admitting: PODIATRIST

## 2023-04-07 ENCOUNTER — APPOINTMENT (OUTPATIENT)
Dept: RADIOLOGY | Facility: HOSPITAL | Age: 63
End: 2023-04-07

## 2023-04-07 ENCOUNTER — ANESTHESIA (OUTPATIENT)
Dept: PERIOP | Facility: HOSPITAL | Age: 63
End: 2023-04-07

## 2023-04-07 VITALS
RESPIRATION RATE: 18 BRPM | WEIGHT: 186 LBS | DIASTOLIC BLOOD PRESSURE: 68 MMHG | SYSTOLIC BLOOD PRESSURE: 106 MMHG | TEMPERATURE: 96.8 F | OXYGEN SATURATION: 93 % | HEIGHT: 62 IN | BODY MASS INDEX: 34.23 KG/M2 | HEART RATE: 102 BPM

## 2023-04-07 DIAGNOSIS — G89.18 POST-OPERATIVE PAIN: Primary | ICD-10-CM

## 2023-04-07 DIAGNOSIS — M20.42 HAMMER TOE OF LEFT FOOT: ICD-10-CM

## 2023-04-07 DIAGNOSIS — M86.9 OSTEOMYELITIS OF FOURTH TOE OF LEFT FOOT (HCC): ICD-10-CM

## 2023-04-07 RX ORDER — SODIUM CHLORIDE, SODIUM LACTATE, POTASSIUM CHLORIDE, CALCIUM CHLORIDE 600; 310; 30; 20 MG/100ML; MG/100ML; MG/100ML; MG/100ML
125 INJECTION, SOLUTION INTRAVENOUS CONTINUOUS
Status: DISCONTINUED | OUTPATIENT
Start: 2023-04-07 | End: 2023-04-07 | Stop reason: HOSPADM

## 2023-04-07 RX ORDER — PROPOFOL 10 MG/ML
INJECTION, EMULSION INTRAVENOUS CONTINUOUS PRN
Status: DISCONTINUED | OUTPATIENT
Start: 2023-04-07 | End: 2023-04-07

## 2023-04-07 RX ORDER — PROPOFOL 10 MG/ML
INJECTION, EMULSION INTRAVENOUS AS NEEDED
Status: DISCONTINUED | OUTPATIENT
Start: 2023-04-07 | End: 2023-04-07

## 2023-04-07 RX ORDER — CEFAZOLIN SODIUM 2 G/50ML
SOLUTION INTRAVENOUS AS NEEDED
Status: DISCONTINUED | OUTPATIENT
Start: 2023-04-07 | End: 2023-04-07

## 2023-04-07 RX ORDER — FENTANYL CITRATE 50 UG/ML
INJECTION, SOLUTION INTRAMUSCULAR; INTRAVENOUS AS NEEDED
Status: DISCONTINUED | OUTPATIENT
Start: 2023-04-07 | End: 2023-04-07

## 2023-04-07 RX ORDER — OXYCODONE HYDROCHLORIDE AND ACETAMINOPHEN 5; 325 MG/1; MG/1
1 TABLET ORAL EVERY 4 HOURS PRN
Status: CANCELLED | OUTPATIENT
Start: 2023-04-07

## 2023-04-07 RX ORDER — HYDROMORPHONE HCL IN WATER/PF 6 MG/30 ML
0.2 PATIENT CONTROLLED ANALGESIA SYRINGE INTRAVENOUS
Status: DISCONTINUED | OUTPATIENT
Start: 2023-04-07 | End: 2023-04-07 | Stop reason: HOSPADM

## 2023-04-07 RX ORDER — MAGNESIUM HYDROXIDE 1200 MG/15ML
LIQUID ORAL AS NEEDED
Status: DISCONTINUED | OUTPATIENT
Start: 2023-04-07 | End: 2023-04-07 | Stop reason: HOSPADM

## 2023-04-07 RX ORDER — FENTANYL CITRATE/PF 50 MCG/ML
25 SYRINGE (ML) INJECTION
Status: DISCONTINUED | OUTPATIENT
Start: 2023-04-07 | End: 2023-04-07 | Stop reason: HOSPADM

## 2023-04-07 RX ORDER — DEXAMETHASONE SODIUM PHOSPHATE 10 MG/ML
INJECTION, SOLUTION INTRAMUSCULAR; INTRAVENOUS AS NEEDED
Status: DISCONTINUED | OUTPATIENT
Start: 2023-04-07 | End: 2023-04-07

## 2023-04-07 RX ORDER — ONDANSETRON 2 MG/ML
INJECTION INTRAMUSCULAR; INTRAVENOUS AS NEEDED
Status: DISCONTINUED | OUTPATIENT
Start: 2023-04-07 | End: 2023-04-07

## 2023-04-07 RX ORDER — ACETAMINOPHEN AND CODEINE PHOSPHATE 300; 30 MG/1; MG/1
1 TABLET ORAL EVERY 4 HOURS PRN
Qty: 18 TABLET | Refills: 0 | Status: SHIPPED | OUTPATIENT
Start: 2023-04-07 | End: 2023-04-17

## 2023-04-07 RX ORDER — ACETAMINOPHEN 325 MG/1
650 TABLET ORAL EVERY 4 HOURS PRN
Status: CANCELLED | OUTPATIENT
Start: 2023-04-07

## 2023-04-07 RX ORDER — DIPHENHYDRAMINE HYDROCHLORIDE 50 MG/ML
12.5 INJECTION INTRAMUSCULAR; INTRAVENOUS ONCE AS NEEDED
Status: DISCONTINUED | OUTPATIENT
Start: 2023-04-07 | End: 2023-04-07 | Stop reason: HOSPADM

## 2023-04-07 RX ADMIN — PROPOFOL 50 MG: 10 INJECTION, EMULSION INTRAVENOUS at 07:32

## 2023-04-07 RX ADMIN — PROPOFOL 100 MCG/KG/MIN: 10 INJECTION, EMULSION INTRAVENOUS at 07:32

## 2023-04-07 RX ADMIN — FENTANYL CITRATE 50 MCG: 50 INJECTION, SOLUTION INTRAMUSCULAR; INTRAVENOUS at 07:31

## 2023-04-07 RX ADMIN — FENTANYL CITRATE 25 MCG: 50 INJECTION, SOLUTION INTRAMUSCULAR; INTRAVENOUS at 07:46

## 2023-04-07 RX ADMIN — DEXAMETHASONE SODIUM PHOSPHATE 10 MG: 10 INJECTION, SOLUTION INTRAMUSCULAR; INTRAVENOUS at 07:40

## 2023-04-07 RX ADMIN — FENTANYL CITRATE 25 MCG: 50 INJECTION, SOLUTION INTRAMUSCULAR; INTRAVENOUS at 08:01

## 2023-04-07 RX ADMIN — PROPOFOL 30 MG: 10 INJECTION, EMULSION INTRAVENOUS at 07:42

## 2023-04-07 RX ADMIN — SODIUM CHLORIDE, SODIUM LACTATE, POTASSIUM CHLORIDE, AND CALCIUM CHLORIDE: .6; .31; .03; .02 INJECTION, SOLUTION INTRAVENOUS at 07:22

## 2023-04-07 RX ADMIN — PROPOFOL 50 MG: 10 INJECTION, EMULSION INTRAVENOUS at 07:34

## 2023-04-07 RX ADMIN — CEFAZOLIN SODIUM 2000 MG: 2 SOLUTION INTRAVENOUS at 07:30

## 2023-04-07 RX ADMIN — ONDANSETRON 4 MG: 2 INJECTION INTRAMUSCULAR; INTRAVENOUS at 07:40

## 2023-04-07 NOTE — OP NOTE
OPERATIVE REPORT - Podiatry  PATIENT NAME: Chen Felix    :  1960  MRN: [id number]  Pt Location:  OR ROOM 12    SURGERY DATE: 2023    Surgeon(s) and Role:     * Jackie Llanos DPM - Primary     * Yoandy Storey DPM - Assisting    Pre-op Diagnosis:  Osteomyelitis of fourth toe of left foot ([address] ) [M86 9]  Hammer toe of left foot [M20 42]    Post-Op Diagnosis Codes:     * Osteomyelitis of fourth toe of left foot (HCC) [M86 9]     * Hammer toe of left foot [M20 42]    Procedure(s) (LRB):  AMPUTATION TOE 4th (Left)    Specimen(s):  ID Type Source Tests Collected by Time Destination   1 : LEFT 4TH TOE Tissue Toe, Left TISSUE EXAM Jackie Llanos DPM 2023 0734        Estimated Blood Loss:   Minimal    Drains:  * No LDAs found *    Anesthesia Type:   IV Sedation with Anesthesia with 5 ml of 1% Lidocaine and 0 5% Bupivacaine in a 1:1 mixture    Hemostasis:  Pneumatic ankle tourniquet set at 250 mmHg for 12 mins  Direct compression, electrocautery    Materials:  * No implants in log *  4-0 nylon    Injectables:  None    Operative Findings:  Consistent with Diagnosis    Complications:   None    Procedure and Technique:     Under mild sedation, the patient was brought into the operating room and placed on the operating room table in the supine position  IV sedation was achieved by anesthesia team and a universal timeout was performed where all parties are in agreement of correct patient, correct procedure and correct site  A pneumatic tourniquet was then placed over the patient's left lower extremity with ample padding  A digital block was performed consisting of 5 ml of local anesthetic  The foot was then prepped and draped in the usual aseptic manner  An esmarch bandage was used to exsangunate the foot and the pneumatic tourniquet was then inflated to 250 mmHg  Attention was directed to the left, 4th toe where a fishmouth type of incision was made   Utilizing a sterile 15 blade, this incision was "carried deep straight to bone  Soft tissue structures were then reflected off the proximal phalanx head and neck  A sagittal saw was then utilized to make a transverse cut in the proximal phalanx  The severed digit was then passed off to the back table  It was noted that all tissue margins were bleeding and viable  No deep sinus tracts or areas of purulence were visualized  The remaining bone on the proximal aspect of the cut was noted to be of hard and viable quality  Any remaining tendinous structures were identified and severed proximally to remove any nidus of infection  The surgical incision was irrigated with copious amounts of normal sterile saline  Skin edges were reapproximated and closure was obtained utilizing 4-0 nylon  The foot was then cleansed and dried  The incision site was dressed with betadine soaked adaptic, gauze  This was then covered with a Zoey and tape    The tourniquet was deflated at approximately 12 min and normal hyperemic response was noted to all digits  The patient tolerated the procedure and anesthesia well without immediate complications and transferred to PACU with vital signs stable  Dr Tere Lopez was present during the entire procedure and participated in all key aspects  SIGNATURE: Gamal Marin DPM  DATE: April 7, 2023  TIME: 8:06 AM      Portions of the record may have been created with voice recognition software  Occasional wrong word or \"sound a like\" substitutions may have occurred due to the inherent limitations of voice recognition software  Read the chart carefully and recognize, using context, where substitutions have occurred              "

## 2023-04-07 NOTE — ANESTHESIA PREPROCEDURE EVALUATION
Procedure:  AMPUTATION TOE 4th (Left: Arm Lower)    Relevant Problems   CARDIO   (+) Essential hypertension   (+) Hyperlipidemia      ENDO   (+) Acquired hypothyroidism      GI/HEPATIC   (+) Erosive esophagitis   (+) Hiatal hernia   (+) Upper GI bleed      HEMATOLOGY   (+) Iron deficiency anemia   (+) Iron deficiency anemia due to acute on chronic blood loss   (+) Microcytic anemia      MUSCULOSKELETAL   (+) Chronic bilateral low back pain without sciatica   (+) DDD (degenerative disc disease), lumbar   (+) Hiatal hernia   (+) Lumbar spondylosis   (+) Rheumatoid arthritis involving both feet, unspecified whether rheumatoid factor present (HCC)      NEURO/PSYCH   (+) Anxiety   (+) Chronic bilateral low back pain without sciatica   (+) Word finding difficulty      Respiratory   (+) Acute non-recurrent maxillary sinusitis      Digestive   (+) Schreiber's esophagus      Nervous and Auditory   (+) Acute encephalopathy   (+) Lumbar radiculopathy      Musculoskeletal and Integument   (+) Spondylolisthesis at L4-L5 level      Other   (+) Dizziness   (+) Elevated BP without diagnosis of hypertension   (+) SIRS (systemic inflammatory response syndrome) (HCC)   (+) Serotonin syndrome   (+) Spinal stenosis of lumbar region with neurogenic claudication        Physical Exam    Airway    Mallampati score: II  TM Distance: <3 FB  Neck ROM: full     Dental   upper dentures and lower dentures,     Cardiovascular  Cardiovascular exam normal    Pulmonary  Pulmonary exam normal     Other Findings        Anesthesia Plan  ASA Score- 2     Anesthesia Type- IV sedation with anesthesia with ASA Monitors  Additional Monitors:   Airway Plan: LMA  Plan Factors-Exercise tolerance (METS): <4 METS  Chart reviewed  EKG reviewed  Existing labs reviewed  Patient is not a current smoker  Patient not instructed to abstain from smoking on day of procedure  Patient did not smoke on day of surgery          Induction- intravenous  Postoperative Plan-     Informed Consent- Anesthetic plan and risks discussed with patient  I personally reviewed this patient with the CRNA  Discussed and agreed on the Anesthesia Plan with the CRNA  Huma Horowitz

## 2023-04-07 NOTE — DISCHARGE SUMMARY
Discharge Summary Outpatient Procedure Podiatry -   Beverley Batch 58 y o  female MRN: [id number]  Unit/Bed#: OR POOL Encounter: [id number]    Admission Date: 4/7/2023     Admitting Diagnosis: Osteomyelitis of fourth toe of left foot ([address] ) [M86 9]  Hammer toe of left foot [M20 42]    Discharge Diagnosis: same    Procedures Performed: AMPUTATION TOE 4th: 42230 (CPT®)    Complications: none    Condition at Discharge: stable    Discharge instructions/Information to patient and family:   See after visit summary for information provided to patient and family  Provisions for Follow-Up Care/Important appointments:  See after visit summary for information related to follow-up care and any pertinent home health orders  Discharge Medications:  See after visit summary for reconciled discharge medications provided to patient and family

## 2023-04-07 NOTE — ANESTHESIA POSTPROCEDURE EVALUATION
Post-Op Assessment Note    CV Status:  Stable  Pain Score: 0    Pain management: adequate     Mental Status:  Alert and awake   Hydration Status:  Euvolemic   PONV Controlled:  None   Airway Patency:  Patent      Post Op Vitals Reviewed: Yes      Staff: CRNA         There were no known notable events for this encounter      BP   145/79   Temp  98 1   Pulse 104   Resp 20   SpO2   98
actual

## 2023-04-25 ENCOUNTER — TELEPHONE (OUTPATIENT)
Dept: INTERNAL MEDICINE CLINIC | Facility: CLINIC | Age: 63
End: 2023-04-25

## 2023-04-25 NOTE — TELEPHONE ENCOUNTER
I called the patient as she had a virtual appointment scheduled  Sonia Carney wanted to speak about her mom  The patient stated that her mom is at [address] [facility] and is not doing well  She has a cold with a lot of mucous  Sonia Carney indicated that she is ok but wanted Dr Lin Gravely to know that her mom is very sick  Sonia Carney asked that her mom be sent to the hospital and they do not feel that she needs to go

## 2023-04-28 ENCOUNTER — HOSPITAL ENCOUNTER (INPATIENT)
Facility: HOSPITAL | Age: 63
LOS: 1 days | Discharge: HOME | End: 2023-04-29
Attending: EMERGENCY MEDICINE | Admitting: INTERNAL MEDICINE

## 2023-04-28 ENCOUNTER — TELEPHONE (OUTPATIENT)
Dept: OTHER | Facility: OTHER | Age: 63
End: 2023-04-28

## 2023-04-28 ENCOUNTER — APPOINTMENT (EMERGENCY)
Dept: RADIOLOGY | Facility: HOSPITAL | Age: 63
End: 2023-04-28

## 2023-04-28 DIAGNOSIS — R11.10 VOMITING: ICD-10-CM

## 2023-04-28 DIAGNOSIS — D64.9 SYMPTOMATIC ANEMIA: Primary | ICD-10-CM

## 2023-04-28 DIAGNOSIS — K92.2 UPPER GI BLEED: ICD-10-CM

## 2023-04-28 DIAGNOSIS — R05.9 COUGH: ICD-10-CM

## 2023-04-28 DIAGNOSIS — D64.9 ANEMIA, UNSPECIFIED TYPE: ICD-10-CM

## 2023-04-28 PROBLEM — J20.8 VIRAL BRONCHITIS: Status: ACTIVE | Noted: 2023-04-28

## 2023-04-28 LAB
ABO GROUP BLD: NORMAL
ANION GAP SERPL CALCULATED.3IONS-SCNC: 3 MMOL/L (ref 4–13)
ATRIAL RATE: 90 BPM
BASOPHILS # BLD AUTO: 0.04 THOUSANDS/ÂΜL (ref 0–0.1)
BASOPHILS NFR BLD AUTO: 1 % (ref 0–1)
BLD GP AB SCN SERPL QL: NEGATIVE
BUN SERPL-MCNC: 10 MG/DL (ref 5–25)
CALCIUM SERPL-MCNC: 8.9 MG/DL (ref 8.3–10.1)
CARDIAC TROPONIN I PNL SERPL HS: 2 NG/L
CHLORIDE SERPL-SCNC: 102 MMOL/L (ref 96–108)
CO2 SERPL-SCNC: 29 MMOL/L (ref 21–32)
CREAT SERPL-MCNC: 0.63 MG/DL (ref 0.6–1.3)
EOSINOPHIL # BLD AUTO: 0.28 THOUSAND/ÂΜL (ref 0–0.61)
EOSINOPHIL NFR BLD AUTO: 6 % (ref 0–6)
ERYTHROCYTE [DISTWIDTH] IN BLOOD BY AUTOMATED COUNT: 23.2 % (ref 11.6–15.1)
FLUAV RNA RESP QL NAA+PROBE: NEGATIVE
FLUBV RNA RESP QL NAA+PROBE: NEGATIVE
GFR SERPL CREATININE-BSD FRML MDRD: 96 ML/MIN/1.73SQ M
GLUCOSE SERPL-MCNC: 105 MG/DL (ref 65–140)
HCT VFR BLD AUTO: 25 % (ref 34.8–46.1)
HGB BLD-MCNC: 6.6 G/DL (ref 11.5–15.4)
IMM GRANULOCYTES # BLD AUTO: 0.01 THOUSAND/UL (ref 0–0.2)
IMM GRANULOCYTES NFR BLD AUTO: 0 % (ref 0–2)
LYMPHOCYTES # BLD AUTO: 0.84 THOUSANDS/ÂΜL (ref 0.6–4.47)
LYMPHOCYTES NFR BLD AUTO: 19 % (ref 14–44)
MCH RBC QN AUTO: 15.6 PG (ref 26.8–34.3)
MCHC RBC AUTO-ENTMCNC: 26.4 G/DL (ref 31.4–37.4)
MCV RBC AUTO: 59 FL (ref 82–98)
MONOCYTES # BLD AUTO: 0.39 THOUSAND/ÂΜL (ref 0.17–1.22)
MONOCYTES NFR BLD AUTO: 9 % (ref 4–12)
NEUTROPHILS # BLD AUTO: 2.79 THOUSANDS/ÂΜL (ref 1.85–7.62)
NEUTS SEG NFR BLD AUTO: 65 % (ref 43–75)
NRBC BLD AUTO-RTO: 0 /100 WBCS
P AXIS: 62 DEGREES
PLATELET # BLD AUTO: 287 THOUSANDS/UL (ref 149–390)
PMV BLD AUTO: 8.7 FL (ref 8.9–12.7)
POTASSIUM SERPL-SCNC: 3.3 MMOL/L (ref 3.5–5.3)
PR INTERVAL: 126 MS
QRS AXIS: 9 DEGREES
QRSD INTERVAL: 78 MS
QT INTERVAL: 370 MS
QTC INTERVAL: 452 MS
RBC # BLD AUTO: 4.24 MILLION/UL (ref 3.81–5.12)
RH BLD: POSITIVE
RSV RNA RESP QL NAA+PROBE: NEGATIVE
SARS-COV-2 RNA RESP QL NAA+PROBE: NEGATIVE
SODIUM SERPL-SCNC: 134 MMOL/L (ref 135–147)
SPECIMEN EXPIRATION DATE: NORMAL
T WAVE AXIS: 72 DEGREES
VENTRICULAR RATE: 90 BPM
WBC # BLD AUTO: 4.35 THOUSAND/UL (ref 4.31–10.16)

## 2023-04-28 PROCEDURE — 30233N1 TRANSFUSION OF NONAUTOLOGOUS RED BLOOD CELLS INTO PERIPHERAL VEIN, PERCUTANEOUS APPROACH: ICD-10-PCS | Performed by: EMERGENCY MEDICINE

## 2023-04-28 RX ORDER — SUCRALFATE 1 G/1
1 TABLET ORAL
Status: DISCONTINUED | OUTPATIENT
Start: 2023-04-29 | End: 2023-04-29 | Stop reason: HOSPADM

## 2023-04-28 RX ORDER — IPRATROPIUM BROMIDE AND ALBUTEROL SULFATE 2.5; .5 MG/3ML; MG/3ML
3 SOLUTION RESPIRATORY (INHALATION) ONCE
Status: COMPLETED | OUTPATIENT
Start: 2023-04-28 | End: 2023-04-28

## 2023-04-28 RX ORDER — ACETAMINOPHEN 325 MG/1
650 TABLET ORAL ONCE
Status: COMPLETED | OUTPATIENT
Start: 2023-04-28 | End: 2023-04-28

## 2023-04-28 RX ORDER — GUAIFENESIN 100 MG/5ML
200 SOLUTION ORAL EVERY 4 HOURS PRN
Status: DISCONTINUED | OUTPATIENT
Start: 2023-04-28 | End: 2023-04-29 | Stop reason: HOSPADM

## 2023-04-28 RX ORDER — PAROXETINE HYDROCHLORIDE 20 MG/1
60 TABLET, FILM COATED ORAL DAILY
Status: DISCONTINUED | OUTPATIENT
Start: 2023-04-29 | End: 2023-04-29 | Stop reason: HOSPADM

## 2023-04-28 RX ORDER — KETOROLAC TROMETHAMINE 30 MG/ML
15 INJECTION, SOLUTION INTRAMUSCULAR; INTRAVENOUS ONCE
Status: COMPLETED | OUTPATIENT
Start: 2023-04-28 | End: 2023-04-28

## 2023-04-28 RX ORDER — QUETIAPINE 200 MG/1
400 TABLET, FILM COATED, EXTENDED RELEASE ORAL
Status: DISCONTINUED | OUTPATIENT
Start: 2023-04-28 | End: 2023-04-29 | Stop reason: HOSPADM

## 2023-04-28 RX ORDER — LORAZEPAM 1 MG/1
2 TABLET ORAL 2 TIMES DAILY
Status: DISCONTINUED | OUTPATIENT
Start: 2023-04-28 | End: 2023-04-29 | Stop reason: HOSPADM

## 2023-04-28 RX ORDER — LEVOTHYROXINE SODIUM 0.03 MG/1
25 TABLET ORAL DAILY
Status: DISCONTINUED | OUTPATIENT
Start: 2023-04-29 | End: 2023-04-29 | Stop reason: HOSPADM

## 2023-04-28 RX ORDER — AMLODIPINE BESYLATE 5 MG/1
5 TABLET ORAL DAILY
Status: DISCONTINUED | OUTPATIENT
Start: 2023-04-29 | End: 2023-04-29 | Stop reason: HOSPADM

## 2023-04-28 RX ORDER — ACETAMINOPHEN 325 MG/1
650 TABLET ORAL EVERY 6 HOURS PRN
Status: DISCONTINUED | OUTPATIENT
Start: 2023-04-28 | End: 2023-04-29 | Stop reason: HOSPADM

## 2023-04-28 RX ORDER — PANTOPRAZOLE SODIUM 40 MG/10ML
40 INJECTION, POWDER, LYOPHILIZED, FOR SOLUTION INTRAVENOUS EVERY 12 HOURS SCHEDULED
Status: DISCONTINUED | OUTPATIENT
Start: 2023-04-28 | End: 2023-04-29 | Stop reason: HOSPADM

## 2023-04-28 RX ORDER — ONDANSETRON HYDROCHLORIDE 4 MG/5ML
4 SOLUTION ORAL ONCE
Status: COMPLETED | OUTPATIENT
Start: 2023-04-28 | End: 2023-04-28

## 2023-04-28 RX ORDER — ZIPRASIDONE HYDROCHLORIDE 40 MG/1
80 CAPSULE ORAL DAILY
Status: DISCONTINUED | OUTPATIENT
Start: 2023-04-29 | End: 2023-04-29 | Stop reason: HOSPADM

## 2023-04-28 RX ORDER — ONDANSETRON 2 MG/ML
4 INJECTION INTRAMUSCULAR; INTRAVENOUS EVERY 6 HOURS PRN
Status: DISCONTINUED | OUTPATIENT
Start: 2023-04-28 | End: 2023-04-29 | Stop reason: HOSPADM

## 2023-04-28 RX ADMIN — ONDANSETRON HYDROCHLORIDE 4 MG: 4 SOLUTION ORAL at 17:15

## 2023-04-28 RX ADMIN — PANTOPRAZOLE SODIUM 40 MG: 40 INJECTION, POWDER, FOR SOLUTION INTRAVENOUS at 22:04

## 2023-04-28 RX ADMIN — ACETAMINOPHEN 650 MG: 325 TABLET ORAL at 17:15

## 2023-04-28 RX ADMIN — IPRATROPIUM BROMIDE AND ALBUTEROL SULFATE 3 ML: .5; 3 SOLUTION RESPIRATORY (INHALATION) at 17:26

## 2023-04-28 RX ADMIN — KETOROLAC TROMETHAMINE 15 MG: 30 INJECTION, SOLUTION INTRAMUSCULAR at 17:30

## 2023-04-28 RX ADMIN — QUETIAPINE FUMARATE 400 MG: 200 TABLET, EXTENDED RELEASE ORAL at 22:04

## 2023-04-28 NOTE — TELEPHONE ENCOUNTER
LMOM FOR PT WITH RECOMMENDATION SHE WOULD BE A GOOD CANDIDATE FOR MILES-EN-Y BYPASS SURGERY  PLEASE CALL BACK WITH ANY QUESTIONS

## 2023-04-28 NOTE — ED PROVIDER NOTES
History  Chief Complaint   Patient presents with    Vomiting    Cough     Pt has c/o cough and vomiting for 2 days  Pt states her mom is at [facility] and is sick with the same  70-year-old female presents to the emergency department complaining of cough, vomiting, chest pressure, stuffy nose  Symptoms have been ongoing for approximately 2 days  States her elderly mother is at a nursing facility and has similar complaints  Patient has an extensive medical history  She has not taken any ibuprofen or Tylenol over the last few days  She describes episodes of vomiting light not associated with anything  She has a cough with yellowish sputum  Chest pressure appears to be exertional however she gets pressure while at rest   No associated episodes of diaphoresis, loss of consciousness, radiation of pain  She denies vomiting with chest pressure  Prior to Admission Medications   Prescriptions Last Dose Informant Patient Reported? Taking? LORazepam (ATIVAN) 2 mg tablet   No No   Sig: Take 1 tablet (2 mg total) by mouth every 6 (six) hours as needed for anxiety   PARoxetine (PAXIL) 30 mg tablet   No No   Sig: TAKE 2 TABLETS (60MG TOTAL) BY MOUTH DAILY   QUEtiapine (SEROquel XR) 400 mg 24 hr tablet   No No   Sig: Take 1 tablet (400 mg total) by mouth daily at bedtime   amLODIPine (NORVASC) 5 mg tablet   No No   Sig: TAKE 1 TABLET (5 MG TOTAL) BY MOUTH DAILY     ferrous sulfate 325 (65 Fe) mg tablet   No No   Sig: Take 1 tablet (325 mg total) by mouth 2 (two) times a day with meals   levothyroxine 25 mcg tablet   No No   Sig: TAKE 1 TABLET BY MOUTH EVERY DAY   ondansetron (ZOFRAN) 4 mg tablet   No No   Sig: Take 1 tablet (4 mg total) by mouth every 8 (eight) hours as needed for nausea or vomiting   pantoprazole (PROTONIX) 40 mg tablet   No No   Sig: Take 1 tablet (40 mg total) by mouth 2 (two) times a day   sucralfate (CARAFATE) 1 g tablet   No No   Sig: TAKE 1 TABLET (1 G TOTAL) BY MOUTH 3 (THREE) TIMES A DAY WITH MEALS   triamcinolone (KENALOG) 0 1 % cream   No No   Sig: For acute flares, apply topically twice daily for up to 2 weeks at a time and then take one week off  DO NOT use on the face, armpits, and groin area  When not flaring, can use 1-2 times weekly on areas prone to rash     Patient not taking: Reported on 4/20/2023   ziprasidone (GEODON) 80 mg capsule   No No   Sig: TAKE 1 CAPSULE BY MOUTH EVERY DAY      Facility-Administered Medications: None       Past Medical History:   Diagnosis Date    Anemia     Anxiety     Arthritis     Back pain at L4-L5 level     Schreiber esophagus     Bulimia     Colon polyp     Disease of thyroid gland     hypothyroid    GERD (gastroesophageal reflux disease)     Hearing loss in left ear     History of transfusion     Hyperlipidemia     Hypertension     Hypothyroidism     Leukopenia     NMS (neuroleptic malignant syndrome) 01/03/2020    Pneumonia     RA (rheumatoid arthritis) ([address] )     in feet    Sciatica     Se[address] [facility] [address]     due to medication mix up 8/2018 only one historically    Skin spots, red     lower right arm - poss from cat- no s/s infection    Synovial cyst of lumbar spine     Vertigo     Wears dentures     full set    Weight gain        Past Surgical History:   Procedure Laterality Date    BONE MARROW BIOPSY      BREAST SURGERY      enlargement with implants    CLOSED REDUCTION DISTAL FEMUR FRACTURE      COLONOSCOPY      COSMETIC SURGERY      DENTAL SURGERY      HIP ARTHROPLASTY Right 1/2/2020    Procedure: REVISION TOTAL HIP ARTHROPLASTY WITH REPAIR OF PARIPROSTHETIC FRACTURE - POSTERIOR APPROACH;  Surgeon: Yoav Palacios MD;  Location:  MAIN OR;  Service: Orthopedics    NM AMPUTATION METATARSAL W/TOE SINGLE Left 4/7/2023    Procedure: AMPUTATION TOE 4th;  Surgeon: Latonya Yañez DPM;  Location: [address] [place] [address] MAIN OR;  Service: Podiatry    NM ARTHRP ACETBLR/PROX  [facility] AGRFT/ALGRFT Right 12/11/2019    Procedure: ARTHROPLASTY HIP TOTAL ANTERIOR;  Surgeon: Glen Arriaga MD;  Location: [facility] MAIN [facility];  Service: Orthopedics    AR ESOPHAGOGASTRODUODENOSCOPY TRANSORAL DIAGNOSTIC N/A 5/11/2021    Procedure: ESOPHAGOGASTRODUODENOSCOPY (EGD); Surgeon: Anabel Rivas MD;  Location: [facility] MAIN [facility];  Service: General    AR LAPS RPR PARAESPHGL HRNA INCL FUNDPLSTY [address] N/A 5/11/2021    Procedure: REPAIR HERNIA PARAESOPHAGEAL  LAPAROSCOPIC;  Surgeon: Anabel Rivas MD;  Location: [facility] MAIN [facility];  Service: General    AR UNLISTED PROCEDURE ESOPHAGUS N/A 5/11/2021    Procedure: FUNDOPLICATION TRANSORAL INCISIONLESS;  Surgeon: Yen Rao MD;  [facility] MA[facility];  Service: Gastroenterology    RHINOPLASTY      SINUS SURGERY      TRANSORAL INCISIONLESS FUNDOPLICATION (TIF)  42/73/4201       Family History   Problem Relation Age of Onset    Uterine cancer Mother     Esophageal cancer Father     Colon cancer Maternal Grandmother      I have reviewed and agree with the history as documented  E-Cigarette/Vaping    E-Cigarette Use Never User      E-Cigarette/Vaping Substances    Nicotine No     THC No     CBD No     Flavoring No     Other No     Unknown No      Social History     Tobacco Use    Smoking status: Never    Smokeless tobacco: Never   Vaping Use    Vaping Use: Never used   Substance Use Topics    Alcohol use: Not Currently    Drug use: Not Currently        Review of Systems   Constitutional: Negative for chills and fever  HENT: Negative for ear pain and sore throat  Eyes: Negative for pain and visual disturbance  Respiratory: Negative for cough and shortness of breath  Cardiovascular: Positive for chest pain  Negative for palpitations  Gastrointestinal: Positive for vomiting  Negative for abdominal pain  Genitourinary: Negative for dysuria and hematuria  Musculoskeletal: Negative for arthralgias and back pain  Skin: Negative for color change and rash  Neurological: Negative for seizures and syncope     All other systems reviewed and are negative  Physical Exam  ED Triage Vitals [04/28/23 1626]   Temperature Pulse Respirations Blood Pressure SpO2   98 9 °F (37 2 °C) 91 18 138/70 95 %      Temp Source Heart Rate Source Patient Position - Orthostatic VS BP Location FiO2 (%)   Oral Monitor Sitting Right arm --      Pain Score       No Pain             Orthostatic Vital Signs  Vitals:    04/28/23 1923 04/28/23 1933 04/28/23 2000 04/28/23 2031   BP: 117/61 135/90 137/69 134/81   Pulse: 83 86 84 83   Patient Position - Orthostatic VS:   Lying        Physical Exam  Vitals and nursing note reviewed  Constitutional:       General: She is not in acute distress  Appearance: She is well-developed  HENT:      Head: Normocephalic and atraumatic  Eyes:      Conjunctiva/sclera: Conjunctivae normal    Cardiovascular:      Rate and Rhythm: Normal rate and regular rhythm  Heart sounds: No murmur heard  Pulmonary:      Effort: Pulmonary effort is normal  No respiratory distress  Breath sounds: Wheezing (expiratory wheeze) present  Abdominal:      Palpations: Abdomen is soft  Tenderness: There is no abdominal tenderness  Musculoskeletal:         General: No swelling  Cervical back: Neck supple  Skin:     General: Skin is warm and dry  Capillary Refill: Capillary refill takes less than 2 seconds  Neurological:      Mental Status: She is alert     Psychiatric:         Mood and Affect: Mood normal          ED Medications  Medications   amLODIPine (NORVASC) tablet 5 mg (has no administration in time range)   levothyroxine tablet 25 mcg (has no administration in time range)   LORazepam (ATIVAN) tablet 2 mg (has no administration in time range)   PARoxetine (PAXIL) tablet 60 mg (has no administration in time range)   QUEtiapine (SEROquel XR) 24 hr tablet 400 mg (has no administration in time range)   sucralfate (CARAFATE) tablet 1 g (has no administration in time range)   ziprasidone (GEODON) capsule 80 mg (has no administration in time range)   acetaminophen (TYLENOL) tablet 650 mg (has no administration in time range)   ondansetron (ZOFRAN) injection 4 mg (has no administration in time range)   pantoprazole (PROTONIX) injection 40 mg (has no administration in time range)   guaiFENesin (ROBITUSSIN) oral liquid 200 mg (has no administration in time range)   ipratropium-albuterol (DUO-NEB) 0 5-2 5 mg/3 mL inhalation solution 3 mL (3 mL Nebulization Given 4/28/23 1726)   ondansetron (ZOFRAN) oral solution 4 mg (4 mg Oral Given 4/28/23 1715)   ketorolac (TORADOL) injection 15 mg (15 mg Intramuscular Given 4/28/23 1730)   acetaminophen (TYLENOL) tablet 650 mg (650 mg Oral Given 4/28/23 1715)       Diagnostic Studies  Results Reviewed     Procedure Component Value Units Date/Time    Vitamin B12 [[id number]] Collected: 04/28/23 1723    Lab Status: In process Specimen: Blood from Arm, Right Updated: 04/28/23 2001    Iron Saturation % [[id number]] Collected: 04/28/23 1723    Lab Status: In process Specimen: Blood from Arm, Right Updated: 04/28/23 2001    Iron [[id number]] Collected: 04/28/23 1723    Lab Status: In process Specimen: Blood from Arm, Right Updated: 04/28/23 2001    Folate [[id number]] Collected: 04/28/23 1723    Lab Status: In process Specimen: Blood from Arm, Right Updated: 04/28/23 2001    Ferritin [[id number]] Collected: 04/28/23 1723    Lab Status:  In process Specimen: Blood from Arm, Right Updated: 04/28/23 2001    Occult blood 1-3, stool [[id number]]     Lab Status: No result Specimen: Stool     HS Troponin I 4hr [[id number]]     Lab Status: No result Specimen: Blood     FLU/RSV/COVID - if FLU/RSV clinically relevant [[id number]]  (Normal) Collected: 04/28/23 1715    Lab Status: Final result Specimen: Nares from Nose Updated: 04/28/23 1851     SARS-CoV-2 Negative     INFLUENZA A PCR Negative     INFLUENZA B PCR Negative     RSV PCR Negative    Narrative:      FOR PEDIATRIC PATIENTS - copy/paste COVID Guidelines URL to browser: [email or web address]    SARS-CoV-2 assay is a Nucleic Acid Amplification assay intended for the  qualitative detection of nucleic acid from SARS-CoV-2 in nasopharyngeal  swabs  Results are for the presumptive identification of SARS-CoV-2 RNA  Positive results are indicative of infection with SARS-CoV-2, the virus  causing COVID-19, but do not rule out bacterial infection or co-infection  with other viruses  Laboratories within the [place] and its  territories are required to report all positive results to the appropriate  public health authorities  Negative results do not preclude SARS-CoV-2  infection and should not be used as the sole basis for treatment or other  patient management decisions  Negative results must be combined with  clinical observations, patient history, and epidemiological information  This test has not been FDA cleared or approved  This test has been authorized by FDA under an Emergency Use Authorization  (EUA)  This test is only authorized for the duration of time the  declaration that circumstances exist justifying the authorization of the  emergency use of an in vitro diagnostic tests for detection of SARS-CoV-2  virus and/or diagnosis of COVID-19 infection under section 564(b)(1) of  the Act, 21 U  S C  212HGQ-4(O)(9), unless the authorization is terminated  or revoked sooner  The test has been validated but independent review by FDA  and CLIA is pending  Test performed using [employer] Gene[employer]pert: This RT-PCR assay targets N2,  a region unique to SARS-CoV-2  A conserved region in the E-gene was chosen  for pan-Sarbecovirus detection which includes SARS-CoV-2  According to CMS-2020-01-R, this platform meets the definition of high-throughput technology      HS Troponin I 2hr [[id number]]     Lab Status: No result Specimen: Blood     HS Troponin 0hr (reflex protocol) [[id number]]  (Normal) Collected: 04/28/23 1723    Lab Status: Final result Specimen: Blood from Arm, Right Updated: 04/28/23 1808     hs TnI 0hr 2 ng/L     CBC and differential [[id number]]  (Abnormal) Collected: 04/28/23 1723    Lab Status: Final result Specimen: Blood from Arm, Right Updated: 04/28/23 1759     WBC 4 35 Thousand/uL      RBC 4 24 Million/uL      Hemoglobin 6 6 g/dL      Hematocrit 25 0 %      MCV 59 fL      MCH 15 6 pg      MCHC 26 4 g/dL      RDW 23 2 %      MPV 8 7 fL      Platelets 099 Thousands/uL      nRBC 0 /100 WBCs      Neutrophils Relative 65 %      Immat GRANS % 0 %      Lymphocytes Relative 19 %      Monocytes Relative 9 %      Eosinophils Relative 6 %      Basophils Relative 1 %      Neutrophils Absolute 2 79 Thousands/µL      Immature Grans Absolute 0 01 Thousand/uL      Lymphocytes Absolute 0 84 Thousands/µL      Monocytes Absolute 0 39 Thousand/µL      Eosinophils Absolute 0 28 Thousand/µL      Basophils Absolute 0 04 Thousands/µL     Narrative: This is an appended report  These results have been appended to a previously verified report      Basic metabolic panel [[id number]]  (Abnormal) Collected: 04/28/23 1723    Lab Status: Final result Specimen: Blood from Arm, Right Updated: 04/28/23 1752     Sodium 134 mmol/L      Potassium 3 3 mmol/L      Chloride 102 mmol/L      CO2 29 mmol/L      ANION GAP 3 mmol/L      BUN 10 mg/dL      Creatinine 0 63 mg/dL      Glucose 105 mg/dL      Calcium 8 9 mg/dL      eGFR 96 ml/min/1 73sq m     Narrative:      [place] guidelines for Chronic Kidney Disease (CKD):     Stage 1 with normal or high GFR (GFR > 90 mL/min/1 73 square meters)    Stage 2 Mild CKD (GFR = 60-89 mL/min/1 73 square meters)    Stage 3A Moderate CKD (GFR = 45-59 mL/min/1 73 square meters)    Stage 3B Moderate CKD (GFR = 30-44 mL/min/1 73 square meters)    Stage 4 Severe CKD (GFR = 15-29 mL/min/1 73 square meters)    Stage 5 End Stage CKD (GFR <15 mL/min/1 73 square meters)  Note: GFR calculation is accurate only with a steady state creatinine                 XR chest 2 views    (Results Pending)         Procedures  ECG 12 Lead Documentation Only    Date/Time: 4/28/2023 4:59 PM  Performed by: Jitendra Blandon DO  Authorized by: Jitendra Blandon DO     Indications / Diagnosis:  Chest pressure  ECG reviewed by me, the ED Provider: yes    Patient location:  ED  Previous ECG:     Previous ECG:  Compared to current    Similarity:  No change    Comparison to cardiac monitor: Yes    Interpretation:     Interpretation: normal    Rate:     ECG rate assessment: normal    Rhythm:     Rhythm: sinus rhythm    Ectopy:     Ectopy: none    QRS:     QRS axis:  Normal  Conduction:     Conduction: normal    ST segments:     ST segments:  Normal  T waves:     T waves: normal            ED Course  ED Course as of 04/28/23 2049 Fri Apr 28, 2023   1657 Pt seen and evaluated   1815 Hemoglobin(!!): 6 6  Pt consented for transfusion   1840 Pt revaled at this time  Consented for blood  Will be admitted  Breathing treatment did not help             HEART Risk Score    Flowsheet Row Most Recent Value   Heart Score Risk Calculator    History 0 Filed at: 04/28/2023 2047   ECG 0 Filed at: 04/28/2023 2047   Age 1 Filed at: 04/28/2023 2047   Risk Factors 1 Filed at: 04/28/2023 2047   Troponin 2 Filed at: 04/28/2023 2047   HEART Score 4 Filed at: 04/28/2023 2047                      SBIRT 20yo+    Flowsheet Row Most Recent Value   Initial Alcohol Screen: US AUDIT-C     1  How often do you have a drink containing alcohol? 0 Filed at: 04/28/2023 1629   2  How many drinks containing alcohol do you have on a typical day you are drinking? 0 Filed at: 04/28/2023 1629   3a  Male UNDER 65: How often do you have five or more drinks on one occasion? 0 Filed at: 04/28/2023 1629   3b  FEMALE Any Age, or MALE 65+: How often do you have 4 or more drinks on one occassion?  0 Filed at: 04/28/2023 1629   Audit-C Score 0 Filed at: 04/28/2023 1629 MIA: How many times in the past year have you    Used an illegal drug or used a prescription medication for non-medical reasons? Never Filed at: 04/28/2023 1629                Medical Decision Making  80-year-old female presents emergency department complaining of cough, vomiting, chest pressure    DDx: Upper respiratory infection, pneumonia, ACS, anemia    Plan: Cardiac work-up    Work-up shows anemia  Patient consented for blood transfusion and admitted to hospital for further evaluation of anemia  Amount and/or Complexity of Data Reviewed  Labs: ordered  Decision-making details documented in ED Course  Radiology: ordered  Risk  OTC drugs  Prescription drug management  Decision regarding hospitalization  Disposition  Final diagnoses:   Symptomatic anemia   Vomiting   Cough     Time reflects when diagnosis was documented in both MDM as applicable and the Disposition within this note     Time User Action Codes Description Comment    4/28/2023  6:54 PM Suella Khurram [D64 9] Symptomatic anemia     4/28/2023  6:54 PM Suella Loudoun [R11 10] Vomiting     4/28/2023  6:54 PM Seb Schneider Add [R05 9] Cough     4/28/2023  7:56 PM Gldais Phillips Add [K92 2] Upper GI bleed       ED Disposition     ED Disposition   Admit    Condition   Stable    Date/Time   Fri Apr 28, 2023  6:54 PM    Comment   Case was discussed with Dr Anel Sandhu and the patient's admission status was agreed to be Admission Status: inpatient status to the service of Dr Anel Sandhu   Follow-up Information    None         Patient's Medications   Discharge Prescriptions    No medications on file     No discharge procedures on file  PDMP Review       Value Time User    PDMP Reviewed  Yes 4/6/2023 11:34 AM Alisha Santos MD           ED Provider  Attending physically available and evaluated Will Dallas  DAMARIS managed the patient along with the ED Attending      Electronically Signed by         Sarbjit Bryant Iris Velásquez, DO  04/28/23 2049

## 2023-04-28 NOTE — ED ATTENDING ATTESTATION
4/28/2023  I, Steve Ramirez MD, saw and evaluated the patient  I have discussed the patient with the resident/non-physician practitioner and agree with the resident's/non-physician practitioner's findings, Plan of Care, and MDM as documented in the resident's/non-physician practitioner's note, except where noted  All available labs and Radiology studies were reviewed  I was present for key portions of any procedure(s) performed by the resident/non-physician practitioner and I was immediately available to provide assistance  At this point I agree with the current assessment done in the Emergency Department  I have conducted an independent evaluation of this patient a history and physical is as follows:  Pt has 2 day history of cough uri  some vomiting Pt has been having intermittent chest pressure both at rest and with exercise Pt has history of esophagitis / gastritis requiring transfusions in the past PE: alert heart reg lungs wheezes bilat abd soft nontender MDM: will do cardiac nelson check cxr neb    Pt hgb noted to be 6 pt denies bloody stool but has history of erosive gastritis with transfusions in past  Will transfuse and admit    ED Course         Critical Care Time  CriticalCare Time    Date/Time: 4/29/2023 1:16 PM  Performed by: Steve Ramirez MD  Authorized by:  Steve Ramirez MD     Critical care provider statement:     Critical care time (minutes):  33    Critical care time was exclusive of:  Separately billable procedures and treating other patients    Critical care was necessary to treat or prevent imminent or life-threatening deterioration of the following conditions:  Circulatory failure    Critical care was time spent personally by me on the following activities:  Obtaining history from patient or surrogate, evaluation of patient's response to treatment, examination of patient, ordering and performing treatments and interventions and ordering and review of laboratory studies    I assumed direction of critical care for this patient from another provider in my specialty: no

## 2023-04-29 VITALS
HEART RATE: 98 BPM | TEMPERATURE: 98.2 F | DIASTOLIC BLOOD PRESSURE: 77 MMHG | RESPIRATION RATE: 19 BRPM | OXYGEN SATURATION: 93 % | BODY MASS INDEX: 34.96 KG/M2 | SYSTOLIC BLOOD PRESSURE: 141 MMHG | WEIGHT: 190 LBS | HEIGHT: 62 IN

## 2023-04-29 LAB
ABO GROUP BLD BPU: NORMAL
ANION GAP SERPL CALCULATED.3IONS-SCNC: 3 MMOL/L (ref 4–13)
BPU ID: NORMAL
BUN SERPL-MCNC: 10 MG/DL (ref 5–25)
CALCIUM SERPL-MCNC: 8.9 MG/DL (ref 8.3–10.1)
CHLORIDE SERPL-SCNC: 103 MMOL/L (ref 96–108)
CO2 SERPL-SCNC: 27 MMOL/L (ref 21–32)
CREAT SERPL-MCNC: 0.72 MG/DL (ref 0.6–1.3)
CROSSMATCH: NORMAL
ERYTHROCYTE [DISTWIDTH] IN BLOOD BY AUTOMATED COUNT: 26.6 % (ref 11.6–15.1)
FERRITIN SERPL-MCNC: 3 NG/ML (ref 8–388)
FOLATE SERPL-MCNC: 13.5 NG/ML (ref 3.1–17.5)
GFR SERPL CREATININE-BSD FRML MDRD: 90 ML/MIN/1.73SQ M
GLUCOSE SERPL-MCNC: 86 MG/DL (ref 65–140)
HCT VFR BLD AUTO: 32.7 % (ref 34.8–46.1)
HGB BLD-MCNC: 8.6 G/DL (ref 11.5–15.4)
IRON SATN MFR SERPL: 2 % (ref 15–50)
IRON SERPL-MCNC: 13 UG/DL (ref 50–170)
IRON SERPL-MCNC: 13 UG/DL (ref 50–170)
MCH RBC QN AUTO: 16.8 PG (ref 26.8–34.3)
MCHC RBC AUTO-ENTMCNC: 26.3 G/DL (ref 31.4–37.4)
MCV RBC AUTO: 64 FL (ref 82–98)
PLATELET # BLD AUTO: 280 THOUSANDS/UL (ref 149–390)
PMV BLD AUTO: 8.6 FL (ref 8.9–12.7)
POTASSIUM SERPL-SCNC: 3.9 MMOL/L (ref 3.5–5.3)
RBC # BLD AUTO: 5.13 MILLION/UL (ref 3.81–5.12)
SODIUM SERPL-SCNC: 133 MMOL/L (ref 135–147)
TIBC SERPL-MCNC: 557 UG/DL (ref 250–450)
UNIT DISPENSE STATUS: NORMAL
UNIT PRODUCT CODE: NORMAL
UNIT PRODUCT VOLUME: 350 ML
UNIT RH: NORMAL
WBC # BLD AUTO: 3.56 THOUSAND/UL (ref 4.31–10.16)

## 2023-04-29 RX ORDER — FERROUS SULFATE 325(65) MG
325 TABLET ORAL 2 TIMES DAILY WITH MEALS
Qty: 60 TABLET | Refills: 0 | Status: SHIPPED | OUTPATIENT
Start: 2023-04-29

## 2023-04-29 RX ADMIN — ZIPRASIDONE HYDROCHLORIDE 80 MG: 40 CAPSULE ORAL at 09:35

## 2023-04-29 RX ADMIN — PAROXETINE HYDROCHLORIDE 60 MG: 20 TABLET, FILM COATED ORAL at 09:34

## 2023-04-29 RX ADMIN — LEVOTHYROXINE SODIUM 25 MCG: 25 TABLET ORAL at 09:34

## 2023-04-29 RX ADMIN — AMLODIPINE BESYLATE 5 MG: 5 TABLET ORAL at 09:34

## 2023-04-29 RX ADMIN — IRON SUCROSE 300 MG: 20 INJECTION, SOLUTION INTRAVENOUS at 10:37

## 2023-04-29 RX ADMIN — PANTOPRAZOLE SODIUM 40 MG: 40 INJECTION, POWDER, FOR SOLUTION INTRAVENOUS at 09:34

## 2023-04-29 NOTE — ASSESSMENT & PLAN NOTE
· Reports that her mother is sick with a respiratory infection as well  · Currently afebrile with normal WBCs and CXR  · Supportive care for now

## 2023-04-29 NOTE — UTILIZATION REVIEW
Initial Clinical Review    Admission: Date/Time/Statement:   Admission Orders (From admission, onward)     Ordered        04/28/23 1855  INPATIENT ADMISSION  Once                      Orders Placed This Encounter   Procedures    INPATIENT ADMISSION     Standing Status:   Standing     Number of Occurrences:   1     Order Specific Question:   Level of Care     Answer:   Med Surg [16]     Order Specific Question:   Estimated length of stay     Answer:   More than 2 Midnights     Order Specific Question:   Certification     Answer:   I certify that inpatient services are medically necessary for this patient for a duration of greater than two midnights  See H&P and MD Progress Notes for additional information about the patient's course of treatment  ED Arrival Information     Expected   -    Arrival   4/28/2023 16:16    Acuity   Less Urgent            Means of arrival   Ambulance    Escorted by   [facility]    Service   Hospitalist    Admission type   Emergency            Arrival complaint   Cough/Vomiting           Chief Complaint   Patient presents with    Vomiting    Cough     Pt has c/o cough and vomiting for 2 days  Pt states her mom is at [place] and is sick with the same  Initial Presentation: 58 y o  female with PMH of esophagitis , KEVIN who presents to ED from home with shortness of breath  Associated cough, productive of yellow sputum  Recent sick contact of URI  On exam,expiratory wheezing , abdomen soft, non tender  Labs hgb 6 6  CXR shows nothing acute ,  ECG- NSR  Pt given Duo neb, IV PPI, Iv antiemetic, IV analgesic in ED  PT admitted as Inpatient with symptomatic anemia, viral bronchitis Plan - transfuse 1U PRBC  Repeat CBC in am   Continue carafate and PPI  Iron studies   FOBT  CL diet  GI consult  Date: 4/29   Day 2:    GI consult- history of GERD, hiatal hernia s/p hiatal hernia repair & TIF, gastroparesis, iron deficiency anemia-on po iron at home   Hgb up to 8 6 today after 1 U PRBC yesterday  Denies overt bleeding  Iron panel consistent with iron deficiency, getting IV iron today  Recommend follow-up with hematology as outpatient to discuss IV iron  Likely needs a repeat colonoscopy and capsule endoscopy as outpt  Has EGD w/ Botox already planned  for gastroparesis  Medicine- pt reports feeling better today and requesting d/c Reports SOB improved  Hematology f/u as outpt  ED Triage Vitals [04/28/23 1626]   Temperature Pulse Respirations Blood Pressure SpO2   98 9 °F (37 2 °C) 91 18 138/70 95 %      Temp Source Heart Rate Source Patient Position - Orthostatic VS BP Location FiO2 (%)   Oral Monitor Sitting Right arm --      Pain Score       No Pain          Wt Readings from Last 1 Encounters:   04/28/23 86 2 kg (190 lb)     Additional Vital Signs:   Date/Time Temp Pulse Resp BP MAP (mmHg) SpO2   04/29/23 07:51:18 98 2 °F (36 8 °C) 98 19 141/77 98 93 %   04/28/23 22:56:57 98 9 °F (37 2 °C) 82 20 137/85 102 95 %   04/28/23 2031 98 °F (36 7 °C) 83 19 134/81 -- --   04/28/23 2000 -- 84 20 137/69 97 97 %   04/28/23 1933 98 5 °F (36 9 °C) 86 18 135/90 -- 95 %   04/28/23 1923 98 5 °F (36 9 °C) 83 20 117/61 -- 94 %   04/28/23 1830 -- 88 19 114/60 80 94 %   04/28/23 1800 -- 90 20 116/58 81 93 %     Pertinent Labs/Diagnostic Test Results:    4/28 ECG-NSR  XR chest 2 views   Final Result by Dc Lees MD (04/29 1068)      No acute cardiopulmonary disease                    Workstation performed: [id number]           Results from last 7 days   Lab Units 04/28/23  1715   SARS-COV-2  Negative     Results from last 7 days   Lab Units 04/29/23  0439 04/28/23  1723   WBC Thousand/uL 3 56* 4 35   HEMOGLOBIN g/dL 8 6* 6 6*   HEMATOCRIT % 32 7* 25 0*   PLATELETS Thousands/uL 280 287   NEUTROS ABS Thousands/µL  --  2 79         Results from last 7 days   Lab Units 04/29/23  0439 04/28/23  1723   SODIUM mmol/L 133* 134*   POTASSIUM mmol/L 3 9 3 3*   CHLORIDE mmol/L 103 102   CO2 mmol/L 27 29   ANION GAP mmol/L 3* 3*   BUN mg/dL 10 10   CREATININE mg/dL 0 72 0 63   EGFR ml/min/1 73sq m 90 96   CALCIUM mg/dL 8 9 8 9             Results from last 7 days   Lab Units 04/29/23  0439 04/28/23  1723   GLUCOSE RANDOM mg/dL 86 105               Results from last 7 days   Lab Units 04/28/23  1723   HS TNI 0HR ng/L 2               Results from last 7 days   Lab Units 04/28/23  1723   FERRITIN ng/mL 3*               Results from last 7 days   Lab Units 04/28/23  1715   INFLUENZA A PCR  Negative   INFLUENZA B PCR  Negative   RSV PCR  Negative         ED Treatment:   Medication Administration from 04/28/2023 1615 to 04/28/2023 2252       Date/Time Order Dose Route Action     04/28/2023 1726 EDT ipratropium-albuterol (DUO-NEB) 0 5-2 5 mg/3 mL inhalation solution 3 mL 3 mL Nebulization Given     04/28/2023 1715 EDT ondansetron (ZOFRAN) oral solution 4 mg 4 mg Oral Given     04/28/2023 1730 EDT ketorolac (TORADOL) injection 15 mg 15 mg Intramuscular Given     04/28/2023 1715 EDT acetaminophen (TYLENOL) tablet 650 mg 650 mg Oral Given     04/28/2023 2204 EDT QUEtiapine (SEROquel XR) 24 hr tablet 400 mg 400 mg Oral Given     04/28/2023 2204 EDT pantoprazole (PROTONIX) injection 40 mg 40 mg Intravenous Given        Past Medical History:   Diagnosis Date    Anemia     Anxiety     Arthritis     Back pain at L4-L5 level     Schreiber esophagus     Bulimia     Colon polyp     Disease of thyroid gland     hypothyroid    GERD (gastroesophageal reflux disease)     Hearing loss in left ear     History of transfusion     Hyperlipidemia     Hypertension     Hypothyroidism     Leukopenia     NMS (neuroleptic malignant syndrome) 01/03/2020    Pneumonia     RA [facility]heumatoid arthritis) [facility]     in feet    Sciatica     Seizure ([facility] )     due to medication mix up 8/2018 only one historically    Skin spots, red     lower right arm - poss from cat- no s/s infection    Synovial cyst of lumbar spine     Vertigo  Wears dentures     full set    Weight gain      Present on Admission:   Schizoaffective disorder ([address] )   Severe iron deficiency anemia    Essential hypertension   Obesity, Class I, BMI 30-34 9      Admitting Diagnosis: Cough [R05 9]  Vomiting [R11 10]  Upper GI bleed [K92 2]  Symptomatic anemia [D64 9]  Age/Sex: 58 y o  female  Admission Orders:  Scheduled Medications:  amLODIPine, 5 mg, Oral, Daily  iron sucrose, 300 mg, Intravenous, Daily  levothyroxine, 25 mcg, Oral, Daily  LORazepam, 2 mg, Oral, BID  pantoprazole, 40 mg, Intravenous, Q12H YUMIKO  PARoxetine, 60 mg, Oral, Daily  QUEtiapine, 400 mg, Oral, HS  sucralfate, 1 g, Oral, TID With Meals  ziprasidone, 80 mg, Oral, Daily      Continuous IV Infusions:     PRN Meds:  acetaminophen, 650 mg, Oral, Q6H PRN  guaiFENesin, 200 mg, Oral, Q4H PRN  ondansetron, 4 mg, Intravenous, Q6H PRN    CL diet   OOB as patrice   SCD's   FOBT    IP CONSULT TO GASTROENTEROLOGY    Network Utilization Review Department  ATTENTION: Please call with any questions or concerns to [phone] and carefully listen to the prompts so that you are directed to the right person  All voicemails are confidential   Randy Quiroga all requests for admission clinical reviews, approved or denied determinations and any other requests to dedicated fax number below belonging to the campus where the patient is receiving treatment   List of dedicated fax numbers for the Facilities:  [address] DENIALS (Administrative/Medical Necessity) [phone]   [address] [place] [address] (Maternity/NICU/Pediatrics) 261 [facility] [address]  [address] [facility] [place] [facility] [address]  5000 [address] [facility] [place] [address] 1481 [address] 2080 [facility] 951 [phone]

## 2023-04-29 NOTE — ASSESSMENT & PLAN NOTE
· Continue geodon, paxil and quetiapine  · Pt has an RX for ativan 2mg QID prn but reports she typically takes this twice daily

## 2023-04-29 NOTE — CONSULTS
Consultation -  [facility]  Marty Stager 58 y o  female MRN: [id number]  Unit/Bed#: [facility] 525-01 Encounter: [id number]              Inpatient consult to gastroenterology     Performed by  Ramon Lomas MD     Authorized by Lionel Villatoro MD              Reason for Consult / Principal Problem:     Iron deficiency anemia      ASSESSMENT AND PLAN:    70-year-old female with past medical history of GERD, hiatal hernia s/p hiatal hernia repair & TIF, gastroparesis, iron deficiency anemia, affective disorder, hypertension who presents to the hospital with cough and shortness of breath  Gastroenterology being consulted for anemia  Chronic iron deficiency anemia    Patient has had iron deficiency anemia at least dating back to 2020  She had an EGD and colonoscopy in summer 2020 for evaluation of iron deficiency anemia  Her hemoglobin improved in the interim but has been intermittently low  She presented to the hospital with hemoglobin of 6 6  She received 1 unit packed RBC and hemoglobin overcorrected to 8 6  · She currently has no overt signs of bleeding and is having brown stool  · Iron panel consistent with iron deficiency, getting IV iron today  Recommend follow-up with hematology as outpatient to discuss IV iron  · She recently did have amputation of fourth toe, denies any bleeding from the site and op note does not mention any significant bleeding  · Follow-up in clinic with Dr Soraida Ray to discuss need for further evaluation for iron deficiency anemia  She likely needs a repeat colonoscopy and capsule endoscopy  EGD w/ Botox already planned by Dr Soraida Ray for gastroparesis  GERD  Gastroparesis     Management as per outpatient gastroenterologist     Disposition: Okay to be discharged from GI standpoint    We will arrange for follow-up as outpatient    Ramon Lomas MD   Gastroenterology Fellow       ______________________________________________________________________    HPI: 70-year-old female with past medical history of GERD, hiatal hernia s/p hiatal hernia repair & TIF, gastroparesis, iron deficiency anemia, affective disorder, hypertension who presents to the hospital with cough and shortness of breath  Gastroenterology being consulted for anemia  Patient states that for the past few days she was feeling short of breath and had a productive cough  Therefore she decided to come into the ED for evaluation  She was found to have a hemoglobin of 6 6, baseline hemoglobin ~ 9  She denies any overt signs of GI bleed  She has had chronic iron deficiency anemia and has had an EGD and colonoscopy in September 2020 for evaluation of anemia  She has had multiple EGDs there after  She has had no capsule endoscopy  She takes iron orally at home  She states she is currently having brown stool and was having some vomiting at home which was brown in color  REVIEW OF SYSTEMS:    CONSTITUTIONAL: Denies any fever, chills, rigors, and weight loss  HEENT: No earache or tinnitus  Denies hearing loss or visual disturbances  CARDIOVASCULAR: No chest pain or palpitations  RESPIRATORY: Denies any cough, hemoptysis, shortness of breath or dyspnea on exertion  GASTROINTESTINAL: As noted in the History of Present Illness  GENITOURINARY: No problems with urination  Denies any hematuria or dysuria  NEUROLOGIC: No dizziness or vertigo, denies headaches  MUSCULOSKELETAL: Denies any muscle or joint pain  SKIN: Denies skin rashes or itching  ENDOCRINE: Denies excessive thirst  Denies intolerance to heat or cold  PSYCHOSOCIAL: Denies depression or anxiety  Denies any recent memory loss         Historical Information   Past Medical History:   Diagnosis Date    Anemia     Anxiety     Arthritis     Back pain at L4-L5 level     Schreiber esophagus     Bulimia     Colon polyp     Disease of thyroid gland     hypothyroid    GERD (gastroesophageal reflux disease)     Hearing loss in left ear     History of transfusion     Hyperlipidemia     Hypertension     Hypothyroidism     Leukopenia     NMS (neuroleptic malignant syndrome) 01/03/2020    Pneumonia     RA (rheumatoid arthritis) ([facility] [address] )     in feet    Sciatica     Seizure ([facility] [address] )     due to medication mix up 8/2018 only one historically    Skin spots, red     lower right arm - poss from cat- no s/s infection    Synovial cyst of lumbar spine     Vertigo     Wears dentures     full set    Weight gain      Past Surgical History:   Procedure Laterality Date    BONE MARROW BIOPSY      BREAST SURGERY      enlargement with implants    CLOSED REDUCTION DISTAL FEMUR FRACTURE      COLONOSCOPY      COSMETIC SURGERY      DENTAL SURGERY      HIP ARTHROPLASTY Right 1/2/2020    Procedure: REVISION TOTAL HIP ARTHROPLASTY WITH REPAIR OF PARIPROSTHETIC FRACTURE - POSTERIOR APPROACH;  Surgeon: Niecy Willams MD;  [facility] MAIN [facility];  Service: Orthopedics    MO AMPUTATION METATARSAL W/TOE SINGLE Left 4/7/2023    Procedure: AMPUTATION TOE 4th;  Surgeon: Earline Danielle DPM;  [facility] MAIN OR;  Service: Podiatry    MO ARTHRP ACETBLR/PROX FEM PROSTC AGRFT/ALGRFT Right 12/11/2019    Procedure: ARTHROPLASTY HIP TOTAL ANTERIOR;  Surgeon: Niecy Willams MD;  [facility];  Service: Orthopedics    MO ESOPHAGOGASTRODUODENOSCOPY TRANSORAL DIAGNOSTIC N/A 5/11/2021    Procedure: ESOPHAGOGASTRODUODENOSCOPY (EGD); Surgeon: Juan Miller MD;  [facility];  Service: General    MO LAPS RPR PARAESPHGL HRNA INCL FUNDPLSTY [address] N/A 5/11/2021    Procedure: REPAIR HERNIA PARAESOPHAGEAL  LAPAROSCOPIC;  Surgeon:  Jaun Miller MD;  [facility];  Service: General    MO UNLISTED PROCEDURE ESOPHAGUS N/A 5/11/2021    Procedure: FUNDOPLICATION TRANSORAL INCISIONLESS;  Surgeon: Esmer Collins MD;  [facility];  Service: Gastroenterology    RHINOPLASTY      SINUS SURGERY      TRANSORAL INCISIONLESS FUNDOPLICATION (TIF) 05/11/2021     Social History   Social History     Substance and Sexual Activity   Alcohol Use Not Currently     Social History     Substance and Sexual Activity   Drug Use Not Currently     Social History     Tobacco Use   Smoking Status Never   Smokeless Tobacco Never     Family History   Problem Relation Age of Onset    Uterine cancer Mother     Esophageal cancer Father     Colon cancer Maternal Grandmother        Meds/Allergies     Medications Prior to Admission   Medication    amLODIPine (NORVASC) 5 mg tablet    ferrous sulfate 325 (65 Fe) mg tablet    levothyroxine 25 mcg tablet    LORazepam (ATIVAN) 2 mg tablet    ondansetron (ZOFRAN) 4 mg tablet    pantoprazole (PROTONIX) 40 mg tablet    PARoxetine (PAXIL) 30 mg tablet    QUEtiapine (SEROquel XR) 400 mg 24 hr tablet    sucralfate (CARAFATE) 1 g tablet    triamcinolone (KENALOG) 0 1 % cream    ziprasidone (GEODON) 80 mg capsule     Current Facility-Administered Medications   Medication Dose Route Frequency    acetaminophen (TYLENOL) tablet 650 mg  650 mg Oral Q6H PRN    amLODIPine (NORVASC) tablet 5 mg  5 mg Oral Daily    guaiFENesin (ROBITUSSIN) oral liquid 200 mg  200 mg Oral Q4H PRN    iron sucrose (VENOFER) 300 mg in sodium chloride 0 9 % 250 mL IVPB  300 mg Intravenous Daily    levothyroxine tablet 25 mcg  25 mcg Oral Daily    LORazepam (ATIVAN) tablet 2 mg  2 mg Oral BID    ondansetron (ZOFRAN) injection 4 mg  4 mg Intravenous Q6H PRN    pantoprazole (PROTONIX) injection 40 mg  40 mg Intravenous Q12H YUMIKO    PARoxetine (PAXIL) tablet 60 mg  60 mg Oral Daily    QUEtiapine (SEROquel XR) 24 hr tablet 400 mg  400 mg Oral HS    sucralfate (CARAFATE) tablet 1 g  1 g Oral TID With Meals    ziprasidone (GEODON) capsule 80 mg  80 mg Oral Daily       Allergies   Allergen Reactions    Risperdal [Risperidone] Shortness Of Breath     Rapid heart beat, SOB    Zyprexa [Olanzapine] Shortness Of Breath     Rapid heartbeat    Latex Rash "Band aids, adhesives wears normal underwear, can eat bananas  USE PAPER TAPE           Objective     Blood pressure 141/77, pulse 98, temperature 98 2 °F (36 8 °C), temperature source Oral, resp  rate 19, height 5' 2\" (1 575 m), weight 86 2 kg (190 lb), SpO2 93 %, not currently breastfeeding  Body mass index is 34 75 kg/m²  Intake/Output Summary (Last 24 hours) at 4/29/2023 1129  Last data filed at 4/29/2023 0751  Gross per 24 hour   Intake 710 ml   Output 650 ml   Net 60 ml         PHYSICAL EXAM:      General Appearance:   Alert, cooperative, no distress   HEENT:   Normocephalic, atraumatic, anicteric  Neck:  Supple, symmetrical, trachea midline   Lungs:   Clear to auscultation bilaterally; no rales, rhonchi or wheezing; respirations unlabored    Heart[de-identified]   Regular rate and rhythm; no murmur, rub, or gallop     Abdomen:   Soft, non-tender, non-distended; normal bowel sounds; no masses, no organomegaly    Genitalia:   Deferred    Rectal:   Deferred    Extremities:  No cyanosis, clubbing or edema    Pulses:  2+ and symmetric all extremities    Skin:  No jaundice, rashes, or lesions    Lymph nodes:  No palpable cervical lymphadenopathy        Lab Results:   Admission on 04/28/2023   Component Date Value    Ventricular Rate 04/28/2023 90     Atrial Rate 04/28/2023 90     NJ Interval 04/28/2023 126     QRSD Interval 04/28/2023 78     QT Interval 04/28/2023 370     QTC Interval 04/28/2023 452     P Axis 04/28/2023 62     QRS Axis 04/28/2023 9     T Wave Axis 04/28/2023 72     hs TnI 0hr 04/28/2023 2     Sodium 04/28/2023 134 (L)     Potassium 04/28/2023 3 3 (L)     Chloride 04/28/2023 102     CO2 04/28/2023 29     ANION GAP 04/28/2023 3 (L)     BUN 04/28/2023 10     Creatinine 04/28/2023 0 63     Glucose 04/28/2023 105     Calcium 04/28/2023 8 9     eGFR 04/28/2023 96     WBC 04/28/2023 4 35     RBC 04/28/2023 4 24     Hemoglobin 04/28/2023 6 6 (LL)     Hematocrit 04/28/2023 25 0 (L)     " MCV 04/28/2023 59 (L)     MCH 04/28/2023 15 6 (L)     MCHC 04/28/2023 26 4 (L)     RDW 04/28/2023 23 2 (H)     MPV 04/28/2023 8 7 (L)     Platelets 43/39/8085 287     nRBC 04/28/2023 0     Neutrophils Relative 04/28/2023 65     Immat GRANS % 04/28/2023 0     Lymphocytes Relative 04/28/2023 19     Monocytes Relative 04/28/2023 9     Eosinophils Relative 04/28/2023 6     Basophils Relative 04/28/2023 1     Neutrophils Absolute 04/28/2023 2 79     Immature Grans Absolute 04/28/2023 0 01     Lymphocytes Absolute 04/28/2023 0 84     Monocytes Absolute 04/28/2023 0 39     Eosinophils Absolute 04/28/2023 0 28     Basophils Absolute 04/28/2023 0 04     SARS-CoV-2 04/28/2023 Negative     INFLUENZA A PCR 04/28/2023 Negative     INFLUENZA B PCR 04/28/2023 Negative     RSV PCR 04/28/2023 Negative     ABO Grouping 04/28/2023 O     Rh Factor 04/28/2023 Positive     Antibody Screen 04/28/2023 Negative     Specimen Expiration Date 04/28/2023 [id number]     Unit Product Code 04/29/2023 [id number]     Unit Number 04/29/2023 [id number]     Unit ABO 04/29/2023 O     Unit RH 04/29/2023 POS     Crossmatch 04/29/2023 Compatible     Unit Dispense Status 04/29/2023 Presumed Trans     Unit Product Volume 04/29/2023 350     Ferritin 04/28/2023 3 (L)     Iron Saturation 04/28/2023 2 (L)     TIBC 04/28/2023 557 (H)     Iron 04/28/2023 13 (L)     Folate 04/28/2023 13 5     Iron 04/28/2023 13 (L)     Sodium 04/29/2023 133 (L)     Potassium 04/29/2023 3 9     Chloride 04/29/2023 103     CO2 04/29/2023 27     ANION GAP 04/29/2023 3 (L)     BUN 04/29/2023 10     Creatinine 04/29/2023 0 72     Glucose 04/29/2023 86     Calcium 04/29/2023 8 9     eGFR 04/29/2023 90     WBC 04/29/2023 3 56 (L)     RBC 04/29/2023 5 13 (H)     Hemoglobin 04/29/2023 8 6 (L)     Hematocrit 04/29/2023 32 7 (L)     MCV 04/29/2023 64 (L)     MCH 04/29/2023 16 8 (L)     MCHC 04/29/2023 26 3 (L)     RDW 04/29/2023 26 6 (H)     Platelets 02/26/1247 280     MPV 04/29/2023 8 6 (L)        Imaging Studies: I have personally reviewed pertinent imaging studies

## 2023-04-29 NOTE — ASSESSMENT & PLAN NOTE
· Iron studies suggest severe iron deficiency state  · Iron supplementation  · Outpatient hematology follow-up for iron infusion  · Discussed with the patient she is agreeable

## 2023-04-29 NOTE — PLAN OF CARE
Problem: RESPIRATORY - ADULT  Goal: Achieves optimal ventilation and oxygenation  Description: INTERVENTIONS:  - Assess for changes in respiratory status  - Assess for changes in mentation and behavior  - Position to facilitate oxygenation and minimize respiratory effort  - Oxygen administered by appropriate delivery if ordered  - Initiate smoking cessation education as indicated  - Encourage broncho-pulmonary hygiene including cough, deep breathe, Incentive Spirometry  - Assess the need for suctioning and aspirate as needed  - Assess and instruct to report SOB or any respiratory difficulty  - Respiratory Therapy support as indicated  Outcome: Progressing     Problem: GASTROINTESTINAL - ADULT  Goal: Minimal or absence of nausea and/or vomiting  Description: INTERVENTIONS:  - Administer IV fluids if ordered to ensure adequate hydration  - Maintain NPO status until nausea and vomiting are resolved  - Nasogastric tube if ordered  - Administer ordered antiemetic medications as needed  - Provide nonpharmacologic comfort measures as appropriate  - Advance diet as tolerated, if ordered  - Consider nutrition services referral to assist patient with adequate nutrition and appropriate food choices  Outcome: Progressing     Problem: METABOLIC, FLUID AND ELECTROLYTES - ADULT  Goal: Electrolytes maintained within normal limits  Description: INTERVENTIONS:  - Monitor labs and assess patient for signs and symptoms of electrolyte imbalances  - Administer electrolyte replacement as ordered  - Monitor response to electrolyte replacements, including repeat lab results as appropriate  - Instruct patient on fluid and nutrition as appropriate  Outcome: Progressing  Goal: Fluid balance maintained  Description: INTERVENTIONS:  - Monitor labs   - Monitor I/O and WT  - Instruct patient on fluid and nutrition as appropriate  - Assess for signs & symptoms of volume excess or deficit  Outcome: Progressing     Problem: HEMATOLOGIC - ADULT  Goal: Maintains hematologic stability  Description: INTERVENTIONS  - Assess for signs and symptoms of bleeding or hemorrhage  - Monitor labs  - Administer supportive blood products/factors as ordered and appropriate  Outcome: Progressing

## 2023-04-29 NOTE — DISCHARGE SUMMARY
[facility]  Discharge- Salima Cooper 1960, 58 y o  female MRN: [id number]  Unit/Bed#: [facility] 525-01 Encounter: [id number]  Primary Care Provider: Binu Alvarez MD   Date and time admitted to hospital: 4/28/2023  4:16 PM    * Symptomatic anemia  Assessment & Plan  Patient presented with shortness of breath noted to have hemoglobin of 6 6  S/p 1 unit packed cells  Hemoglobin today 8 6  Evaluated by GI, plans for outpatient GI follow-up noted      Severe iron deficiency anemia   Assessment & Plan  · Iron studies suggest severe iron deficiency state  · Iron supplementation  · Outpatient hematology follow-up for iron infusion  · Discussed with the patient she is agreeable    Viral bronchitis  Assessment & Plan  · Supportive care    Obesity, Class I, BMI 30-34 9  Assessment & Plan  · Therapeutic lifestyle modification encouraged  · Outpatient sleep study recommended    Essential hypertension  Assessment & Plan  · Continue amlodipine  · Monitor blood pressures    Schizoaffective disorder (HCC)  Assessment & Plan  · Continue geodon, paxil and quetiapine          Discharge Summary - [facility]    Patient Information: Salima Cooper 58 y o  female MRN: [id number]  Unit/Bed#: [facility] 525-01 Encounter: [id number]    Discharging Physician / Practitioner: Wilson Olivas MD  PCP: Binu Alvarez MD  Admission Date: 4/28/2023  Discharge Date: 04/29/23    Disposition:     Home    Reason for Admission: Shortness of breath    Discharge Diagnoses:     Principal Problem:    Symptomatic anemia  Active Problems:    Severe iron deficiency anemia     Schizoaffective disorder ([address] )    Essential hypertension    Obesity, Class I, BMI 30-34 9    Viral bronchitis  Resolved Problems:    * No resolved hospital problems   *      Consultations During Hospital Stay:    · GI    Procedures Performed:     · Chest x-ray no active lung disease      Hospital Course:     [place] Quintana "Neelam Rodriguez is a 58 y o  female patient who originally presented to the hospital on 4/28/2023 due to shortness of breath  Patient with history of esophagitis prior history of iron deficiency presented with shortness of breath  On admission her hemoglobin was 6 6, she was transfused 1 unit packed cells  Today her hemoglobin is 8 6  Patient reports feeling better today  She was eval by GI with plans for outpatient endoscopic evaluation  Iron studies suggest chronic iron deficiency state she was provided with IV iron  She is recommended outpatient hematology follow-up she is agreeable  Contact information for hematology in discharge directions  Patient reports symptomatic improvement hemodynamically stable no no features of active GI bleeding and is deemed ready for discharge today  Kindly review the chart for details  Condition at Discharge: fair     Discharge Day Visit / Exam:     Subjective:      Comfortably in bed  Reports improved shortness of breath  Denies chest pain palpitations diaphoresis presyncope  Requesting discharge  History chart labs medications reviewed      Vitals: Blood Pressure: 141/77 (04/29/23 0751)  Pulse: 98 (04/29/23 0751)  Temperature: 98 2 °F (36 8 °C) (04/29/23 0751)  Temp Source: Oral (04/29/23 0751)  Respirations: 19 (04/29/23 0751)  Height: 5' 2\" (157 5 cm) (04/28/23 2256)  Weight - Scale: 86 2 kg (190 lb) (04/28/23 1626)  SpO2: 93 % (04/29/23 0751)  Exam:   Physical Exam    Comfortably in bed  Obese  Short thick neck  Lungs diminished breath sounds bilateral  Heart sounds S1-S2 noted  Heart sounds are distant  Abdomen soft nontender  Abdominal obesity noted  Awake alert obey simple commands  No pedal edema  No rash    Discharge instructions/Information to patient and family:   See after visit summary for information provided to patient and family        Discharge plan discussed with GI  Discharge plan discussed with the patient, offered to call family reports she is " keeping them updated  Outpatient follow-up with GI, hematology, primary care physician    Provisions for Follow-Up Care:  See after visit summary for information related to follow-up care and any pertinent home health orders  Planned Readmission: no     Discharge Statement:  I spent 45 minutes discharging the patient  This time was spent on the day of discharge  I had direct contact with the patient on the day of discharge  Greater than 50% of the total time was spent examining patient, answering all patient questions, arranging and discussing plan of care with patient as well as directly providing post-discharge instructions  Additional time then spent on discharge activities  Discharge Medications:  See after visit summary for reconciled discharge medications provided to patient and family        ** Please Note: This note has been constructed using a voice recognition system **

## 2023-04-29 NOTE — ASSESSMENT & PLAN NOTE
Patient presented with shortness of breath noted to have hemoglobin of 6 6  S/p 1 unit packed cells  Hemoglobin today 8 6  Evaluated by GI, plans for outpatient GI follow-up noted

## 2023-04-29 NOTE — ASSESSMENT & PLAN NOTE
· Presented to the ED with shortness of breath and found to have hb of 6 6  · Has a history of esophagitis and is already on protonix and carafate  · transfuse 1 unit of PRBCs and repeat CBC in AM  · Consult GI for possible endoscopy  · Continue carafate and protonix  · Check iron studies  · Check FOBT

## 2023-04-29 NOTE — H&P
[facility]  H&P  Name: Aidan Gomez 58 y o  female I MRN: [id number]  Unit/Bed#: ED 25 I Date of Admission: 4/28/2023   Date of Service: 4/28/2023 I Hospital Day: 0      Assessment/Plan   Symptomatic anemia  Assessment & Plan  · Presented to the ED with shortness of breath and found to have hb of 6 6  · Has a history of esophagitis and is already on protonix and carafate  · transfuse 1 unit of PRBCs and repeat CBC in AM  · Consult GI for possible endoscopy  · Continue carafate and protonix  · Check iron studies  · Check FOBT    Viral bronchitis  Assessment & Plan  · Reports that her mother is sick with a respiratory infection as well  · Currently afebrile with normal WBCs and CXR  · Supportive care for now    Obesity, Class I, BMI 30-34 9  Assessment & Plan  · Affects all aspects of her care  · Weight loss counseling when stable as an outpatient    Iron deficiency anemia due to acute on chronic blood loss  Assessment & Plan  · Management as above    Essential hypertension  Assessment & Plan  · BP is acceptable  · Continue amlodipine    Schizoaffective disorder (HCC)  Assessment & Plan  · Continue geodon, paxil and quetiapine  · Pt has an RX for ativan 2mg QID prn but reports she typically takes this twice daily         VTE Pharmacologic Prophylaxis:   Moderate Risk (Score 3-4) - Pharmacological DVT Prophylaxis Contraindicated  Sequential Compression Devices Ordered  Code Status: Level 1 - Full Code   Discussion with family: Patient declined call to   Anticipated Length of Stay: Patient will be admitted on an inpatient basis with an anticipated length of stay of greater than 2 midnights secondary to GIB      Total Time Spent on Date of Encounter in care of patient: 55 minutes This time was spent on one or more of the following: performing physical exam; counseling and coordination of care; obtaining or reviewing history; documenting in the medical record; reviewing/ordering tests, medications or procedures; communicating with other healthcare professionals and discussing with patient's family/caregivers  Chief Complaint: shortness of breath    History of Present Illness:  Newton Kan is a 58 y o  female with a PMH of esophagitis who presents with shortness of breath  She reports visiting her mother who is sick with a respiratory infection and thinks she became sick from her  She reports cough and sputum with shortness of breath  Denies any fever or chills  Denies any sore throat or nasal congestion  She presented to the ED and was found to have anemia with a hb of 6 6  She is currently receiving a blood transfusion and feeling better  Review of Systems:  Review of Systems   All other systems reviewed and are negative        Past Medical and Surgical History:   Past Medical History:   Diagnosis Date    Anemia     Anxiety     Arthritis     Back pain at L4-L5 level     Schreiber esophagus     Bulimia     Colon polyp     Disease of thyroid gland     hypothyroid    GERD (gastroesophageal reflux disease)     Hearing loss in left ear     History of transfusion     Hyperlipidemia     Hypertension     Hypothyroidism     Leukopenia     NMS (neuroleptic malignant syndrome) 01/03/2020    Pneumonia     RA (rheumatoid arthritis) ([address] )     in feet    Sciatica     Seizure [address] [address] )     due to medication mix up 8/2018 only one historically    Skin spots, red     lower right arm - poss from cat- no s/s infection    Synovial cyst of lumbar spine     Vertigo     Wears dentures     full set    Weight gain        Past Surgical History:   Procedure Laterality Date    BONE MARROW BIOPSY      BREAST SURGERY      enlargement with implants    CLOSED REDUCTION DISTAL FEMUR FRACTURE      COLONOSCOPY      COSMETIC SURGERY      DENTAL SURGERY      HIP ARTHROPLASTY Right 1/2/2020    Procedure: REVISION TOTAL HIP ARTHROPLASTY WITH REPAIR OF [address] [place] [address];  Surgeon: Ryan Reich MD;  [facility] MAIN [facility];  Service: Orthopedics    ID AMPUTATION METATARSAL W/TOE SINGLE Left 4/7/2023    Procedure: AMPUTATION TOE 4th;  Surgeon: Asael Jamil DPM;  Location: [address] [place] [address] MAIN OR;  Service: Podiatry    ID ARTHRP ACETBLR/PROX FEM PROSTC AGRFT/ALGRFT Right 12/11/2019    Procedure: ARTHROPLASTY HIP TOTAL ANTERIOR;  Surgeon: Ryan Reich MD;  [facility] MAIN [facility];  Service: Orthopedics    ID ESOPHAGOGASTRODUODENOSCOPY TRANSORAL DIAGNOSTIC N/A 5/11/2021    Procedure: ESOPHAGOGASTRODUODENOSCOPY (EGD); Surgeon: Pamela Coronel MD;  [facility] MAIN OR;  Service: General    ID LAPS RPR PARAESPHGL HRNA INCL FUNDPLSTY [facility] N/A 5/11/2021    Procedure: REPAIR HERNIA PARAESOPHAGEAL  LAPAROSCOPIC;  Surgeon: Pamela Coronel MD;  [facility] MA[facility];  Service: General    ID UNLISTED PROCEDURE ESOPHAGUS N/A 5/11/2021    Procedure: FUNDOPLICATION TRANSORAL INCISIONLESS;  Surgeon: Avtar Carlson MD;  [facility] MAIN [facility];  Service: Gastroenterology    RHINOPLASTY      SINUS SURGERY      TRANSORAL INCISIONLESS FUNDOPLICATION (TIF)  02/15/8404       Meds/Allergies:  Prior to Admission medications    Medication Sig Start Date End Date Taking? Authorizing Provider   amLODIPine (NORVASC) 5 mg tablet TAKE 1 TABLET (5 MG TOTAL) BY MOUTH DAILY   1/24/23   Usman Conner MD   ferrous sulfate 325 (65 Fe) mg tablet Take 1 tablet (325 mg total) by mouth 2 (two) times a day with meals 1/12/22   Geneva Lundberg PA-C   levothyroxine 25 mcg tablet TAKE 1 TABLET BY MOUTH EVERY DAY 4/2/23   Usman Conner MD   LORazepam (ATIVAN) 2 mg tablet Take 1 tablet (2 mg total) by mouth every 6 (six) hours as needed for anxiety 4/6/23   Usman Conner MD   ondansetron [facility]) 4 mg tablet Take 1 tablet (4 mg total) by mouth every 8 (eight) hours as needed for nausea or vomiting 3/24/23   Usman Conner MD   pantoprazole (PROTONIX) 40 mg tablet Take 1 tablet (40 mg total) by mouth 2 (two) times a day 1/24/23   Alexis Macedo MD   PARoxetine (PAXIL) 30 mg tablet TAKE 2 TABLETS (60MG TOTAL) BY MOUTH DAILY 1/24/23   Alexis Macedo MD   QUEtiapine (SEROquel XR) 400 mg 24 hr tablet Take 1 tablet (400 mg total) by mouth daily at bedtime 3/2/23   Alexis Macedo MD   sucralfate (CARAFATE) 1 g tablet TAKE 1 TABLET (1 G TOTAL) BY MOUTH 3 (THREE) TIMES A DAY WITH MEALS 3/25/23   Alexis Macedo MD   triamcinolone (KENALOG) 0 1 % cream For acute flares, apply topically twice daily for up to 2 weeks at a time and then take one week off  DO NOT use on the face, armpits, and groin area  When not flaring, can use 1-2 times weekly on areas prone to rash  Patient not taking: Reported on 4/20/2023 9/23/22   Alexis Macedo MD   ziprasidone (GEODON) 80 mg capsule TAKE 1 CAPSULE BY MOUTH EVERY DAY 3/7/22   Alexis Macedo MD   prochlorperazine (COMPAZINE) 10 mg tablet Take 1 tablet (10 mg total) by mouth every 6 (six) hours as needed for nausea or vomiting  Patient not taking: Reported on 1/31/2023 12/14/22 4/28/23  Jose Montaño MD     I have reviewed home medications with patient personally  Allergies:    Allergies   Allergen Reactions    Risperdal [Risperidone] Shortness Of Breath     Rapid heart beat, SOB    Zyprexa [Olanzapine] Shortness Of Breath     Rapid heartbeat    Latex Rash     Band aids, adhesives wears normal underwear, can eat bananas  USE PAPER TAPE       Social History:  Marital Status: Single   Occupation: None  Patient Pre-hospital Living Situation: Home  Patient Pre-hospital Level of Mobility: walks  Patient Pre-hospital Diet Restrictions: None  Substance Use History:   Social History     Substance and Sexual Activity   Alcohol Use Not Currently     Social History     Tobacco Use   Smoking Status Never   Smokeless Tobacco Never     Social History     Substance and Sexual Activity   Drug Use Not Currently       Family History:  Family History   Problem Relation Age of Onset    Uterine cancer Mother     Esophageal cancer Father     Colon cancer Maternal Grandmother        Physical Exam:     Vitals:   Blood Pressure: 137/69 (04/28/23 2000)  Pulse: 84 (04/28/23 2000)  Temperature: 98 5 °F (36 9 °C) (04/28/23 1933)  Temp Source: Oral (04/28/23 1933)  Respirations: 20 (04/28/23 2000)  Weight - Scale: 86 2 kg (190 lb) (04/28/23 1626)  SpO2: 97 % (04/28/23 2000)    Physical Exam  Constitutional:       Appearance: Normal appearance  HENT:      Head: Normocephalic and atraumatic  Nose: Nose normal    Eyes:      Extraocular Movements: Extraocular movements intact  Cardiovascular:      Rate and Rhythm: Normal rate and regular rhythm  Pulmonary:      Effort: Pulmonary effort is normal    Abdominal:      General: There is no distension  Palpations: Abdomen is soft  Tenderness: There is no abdominal tenderness  Musculoskeletal:      Right lower leg: No edema  Left lower leg: No edema  Skin:     General: Skin is warm and dry  Neurological:      General: No focal deficit present  Mental Status: She is alert and oriented to person, place, and time  Psychiatric:         Mood and Affect: Mood normal          Behavior: Behavior normal           Additional Data:     Lab Results:  Results from last 7 days   Lab Units 04/28/23  1723   WBC Thousand/uL 4 35   HEMOGLOBIN g/dL 6 6*   HEMATOCRIT % 25 0*   PLATELETS Thousands/uL 287   NEUTROS PCT % 65   LYMPHS PCT % 19   MONOS PCT % 9   EOS PCT % 6     Results from last 7 days   Lab Units 04/28/23  1723   SODIUM mmol/L 134*   POTASSIUM mmol/L 3 3*   CHLORIDE mmol/L 102   CO2 mmol/L 29   BUN mg/dL 10   CREATININE mg/dL 0 63   ANION GAP mmol/L 3*   CALCIUM mg/dL 8 9   GLUCOSE RANDOM mg/dL 105                       Lines/Drains:  Invasive Devices     Peripheral Intravenous Line  Duration           Peripheral IV 04/28/23 Dorsal (posterior); Right Hand <1 day                    Imaging: Reviewed radiology reports from this admission including: chest xray  XR chest 2 views    (Results Pending)       EKG and Other Studies Reviewed on Admission:   · EKG: NSR  HR 90     ** Please Note: This note has been constructed using a voice recognition system   **

## 2023-04-29 NOTE — ED NOTES
Patient ambulatory to restroom w/o assistance  Pt steady on feet and maintained an appropriate gait       Ryder Villavicencio IV RN  04/28/23 2040

## 2023-04-30 LAB — VIT B12 SERPL-MCNC: 562 PG/ML (ref 100–900)

## 2023-05-01 ENCOUNTER — TRANSITIONAL CARE MANAGEMENT (OUTPATIENT)
Dept: INTERNAL MEDICINE CLINIC | Facility: CLINIC | Age: 63
End: 2023-05-01

## 2023-05-01 NOTE — UTILIZATION REVIEW
"NOTIFICATION OF INPATIENT ADMISSION   AUTHORIZATION REQUEST   SERVICING FACILITY:   [facility]  Address: [address] [place] [address], [address] [place] [address]  Tax ID: [id number]  NPI: [id number] ATTENDING PROVIDER:  Attending Name and NPI#: Eliz Gallegos Md [[id number]]  Address: [address] [place] [address]  Phone: [phone]   ADMISSION INFORMATION:  [facility] Code: 21  Inpatient Admission Date/Time: 4/28/23  6:55 PM  Discharge Date/Time: 4/29/2023  5:13 PM  Admitting Diagnosis Code/Description:  Cough [R05 9]  Vomiting [R11 10]  Upper GI bleed [K92 2]  Symptomatic anemia [D64 9]     UTILIZATION REVIEW CONTACT:  Soto Pak, Utilization   Network Utilization Review Department  Phone: [phone]  Fax: [phone]  Email: [email or web address]  Contact for approvals/pending authorizations, clinical reviews, and discharge  PHYSICIAN ADVISORY SERVICES:  Medical Necessity Denial & [employer]gle[employer] Review  Phone: [phone]  Fax: [phone]  Email: [email or web address]         "

## 2023-05-02 ENCOUNTER — TELEMEDICINE (OUTPATIENT)
Dept: INTERNAL MEDICINE CLINIC | Facility: CLINIC | Age: 63
End: 2023-05-02

## 2023-05-02 VITALS — BODY MASS INDEX: 32.01 KG/M2 | WEIGHT: 175 LBS

## 2023-05-02 DIAGNOSIS — D64.9 SYMPTOMATIC ANEMIA: Primary | ICD-10-CM

## 2023-05-02 NOTE — PATIENT INSTRUCTIONS
Problem List Items Addressed This Visit          Other    Symptomatic anemia - Primary     Continue iron follow-up heme-onc, follow-up GI we will recheck CBC next week         Relevant Orders    CBC and differential

## 2023-05-02 NOTE — PROGRESS NOTES
Virtual TCM Visit:    Verification of patient location:    Patient is located at Home in the following state in which I hold an active license [place]    Assessment/Plan:        Problem List Items Addressed This Visit        Other    Symptomatic anemia - Primary     Continue iron follow-up heme-onc, follow-up GI we will recheck CBC next week         Relevant Orders    CBC and differential        Reason for visit is TCM    Encounter provider Orlando Cristina MD     Provider located at [address] [place] [id number]    Recent Visits  Date Type Provider Dept   04/25/23 Telephone [facility]  recent visits within past 7 days and meeting all other requirements  Today's Visits  Date Type Provider Dept   05/02/23 Telemedicine Orlando Cristina MD [address] today's visits and meeting all other requirements  Future Appointments  No visits were found meeting these conditions  Showing future appointments within next 150 days and meeting all other requirements       After connecting through [employer], the patient was identified by name and date of birth  Parker Dolan was informed that this is a telemedicine visit and that the visit is being conducted through other   Devora Jeff My office door was closed  No one else was in the room  She acknowledged consent and understanding of privacy and security of the video platform  The patient has agreed to participate and understands they can discontinue the visit at any time  Patient is aware this is a billable service  Transitional Care Management Review:  Parker Dolan is a 58 y o  female here for TCM follow up       During the TCM phone call patient stated:    TCM Call     Date and time call was made  5/1/2023 11:34 AM    Hospital care reviewed  Records not available    Patient [facility] at  [facility]    Date of Admission  04/28/23 Date of discharge  04/29/23    Diagnosis  Symptomatic anemia    Disposition  Home    Were the patients medications reviewed and updated  No    Current Symptoms  Fatigue    Dizziness severity  Mild    Neausea severity  Moderate    Fatigue severity  Severe    Quality Character  Lightheadedness    Episode pattern  Intermittent      TCM Call     Post hospital issues  None    Should patient be enrolled in anticoag monitoring? No    Scheduled for follow up? Yes    Not clinically warranted  Patient readmitted  Patients specialists  Other (comment)    Other specialists names  Beena Banegas    Did you obtain your prescribed medications  No    Why were you unable to obtain your medications  need psych appt they will not get her in sooner than next week    Do you need help managing your prescriptions or medications  No    Is transportation to your appointment needed  No    I have advised the patient to call PCP with any new or worsening symptoms  Jostin Grandchild - MR/MA    Are you recieving any outpatient services  No    Are you recieving home care services  No    Types of home care services  Nurse visit    Are you using any community resources  No    Current waiver services  No    Have you fallen in the last 12 months  No    How many times  3-4 TIMES A DAY    Interperter language line needed  No    Counseling  Patient        Subjective:     Patient ID: Elliott Baca is a 58 y o  female  I reviewed patient's hospitalization for symptomatic anemia  She had a blood transfusion and an iron infusion and is going to follow-up with GI for possible endoscopy as an outpatient, and also heme onc  Since home, no bleeding problems, no black tarry stool, no vomiting  She reports compliance with iron supplement twice daily  Review of Systems   Constitutional: Negative for chills, fatigue and fever  HENT: Negative for congestion, nosebleeds, postnasal drip, sore throat and trouble swallowing      Eyes: Negative for pain    Respiratory: Negative for cough, chest tightness, shortness of breath and wheezing  Cardiovascular: Negative for chest pain, palpitations and leg swelling  Gastrointestinal: Negative for abdominal pain, constipation, diarrhea, nausea and vomiting  Endocrine: Negative for polydipsia and polyuria  Genitourinary: Negative for dysuria, flank pain and hematuria  Musculoskeletal: Negative for arthralgias, back pain and myalgias  Skin: Negative for rash  Neurological: Negative for dizziness, tremors, light-headedness and headaches  Hematological: Does not bruise/bleed easily  Psychiatric/Behavioral: Negative for confusion and dysphoric mood  The patient is not nervous/anxious  Objective:    Vitals:    05/02/23 1419   Weight: 79 4 kg (175 lb)       Physical Exam  Constitutional:       General: She is not in acute distress  Pulmonary:      Effort: Pulmonary effort is normal  No respiratory distress  Neurological:      Mental Status: She is alert  Medications have been reviewed by provider in current encounter    I spent 20 minutes with the patient during this visit  Pete March MD      VIRTUAL VISIT DISCLAIMER    Christen Lovellraven verbally agrees to participate in [facility]  Pt is aware that [facility] could be limited without vital signs or the ability to perform a full hands-on physical exam  Irene Plascencia understands she or the provider may request at any time to terminate the video visit and request the patient to seek care or treatment in person

## 2023-05-11 ENCOUNTER — OFFICE VISIT (OUTPATIENT)
Dept: PODIATRY | Facility: CLINIC | Age: 63
End: 2023-05-11

## 2023-05-11 VITALS
SYSTOLIC BLOOD PRESSURE: 125 MMHG | BODY MASS INDEX: 34.04 KG/M2 | HEIGHT: 62 IN | DIASTOLIC BLOOD PRESSURE: 76 MMHG | WEIGHT: 185 LBS

## 2023-05-11 DIAGNOSIS — M20.41 HAMMER TOES OF BOTH FEET: Primary | ICD-10-CM

## 2023-05-11 DIAGNOSIS — M20.42 HAMMER TOES OF BOTH FEET: Primary | ICD-10-CM

## 2023-05-11 LAB
BASOPHILS # BLD AUTO: 41 CELLS/UL (ref 0–200)
BASOPHILS NFR BLD AUTO: 1.4 %
EOSINOPHIL # BLD AUTO: 183 CELLS/UL (ref 15–500)
EOSINOPHIL NFR BLD AUTO: 6.3 %
ERYTHROCYTE [DISTWIDTH] IN BLOOD BY AUTOMATED COUNT: 31.5 % (ref 11–15)
HCT VFR BLD AUTO: 37.4 % (ref 35–45)
HGB BLD-MCNC: 10.2 G/DL (ref 11.7–15.5)
LYMPHOCYTES # BLD AUTO: 563 CELLS/UL (ref 850–3900)
LYMPHOCYTES NFR BLD AUTO: 19.4 %
MCH RBC QN AUTO: 18.1 PG (ref 27–33)
MCHC RBC AUTO-ENTMCNC: 27.3 G/DL (ref 32–36)
MCV RBC AUTO: 66.5 FL (ref 80–100)
MONOCYTES # BLD AUTO: 200 CELLS/UL (ref 200–950)
MONOCYTES NFR BLD AUTO: 6.9 %
NEUTROPHILS # BLD AUTO: 1914 CELLS/UL (ref 1500–7800)
NEUTROPHILS NFR BLD AUTO: 66 %
PLATELET # BLD AUTO: 310 THOUSAND/UL (ref 140–400)
PMV BLD REES-ECKER: 8.9 FL (ref 7.5–12.5)
RBC # BLD AUTO: 5.62 MILLION/UL (ref 3.8–5.1)
WBC # BLD AUTO: 2.9 THOUSAND/UL (ref 3.8–10.8)

## 2023-05-11 NOTE — PROGRESS NOTES
Patient presents approximately 5 weeks post amputation of a portion of the left fourth toe  Surgical site has healed uneventfully and patient is very comfortable wearing her shoes  No additional treatment needed at this site  All toenails are dystrophic and elongated and were trimmed  Patient is rescheduled in 10 weeks for palliative nail care

## 2023-05-12 ENCOUNTER — TELEPHONE (OUTPATIENT)
Dept: HEMATOLOGY ONCOLOGY | Facility: CLINIC | Age: 63
End: 2023-05-12

## 2023-05-12 DIAGNOSIS — D72.819 LEUKOPENIA, UNSPECIFIED TYPE: ICD-10-CM

## 2023-05-12 DIAGNOSIS — D50.9 MICROCYTIC ANEMIA: Primary | ICD-10-CM

## 2023-05-12 NOTE — TELEPHONE ENCOUNTER
I called Sam Coronado to schedule a new patient consultation with [facility] in response to a referral sent to our department  I left a voicemail making patient aware of their referral and instructing them to call [facility] [place] at [phone] to schedule  Another attempt will be made to schedule patient  Patient last seen Dr Ruiz Limb 10/2020   She needs a Follow up scheduled

## 2023-05-15 ENCOUNTER — TELEPHONE (OUTPATIENT)
Dept: INTERNAL MEDICINE CLINIC | Facility: CLINIC | Age: 63
End: 2023-05-15

## 2023-05-15 DIAGNOSIS — E66.9 OBESITY, CLASS I, BMI 30-34.9: Primary | ICD-10-CM

## 2023-05-16 ENCOUNTER — TELEPHONE (OUTPATIENT)
Dept: HEMATOLOGY ONCOLOGY | Facility: CLINIC | Age: 63
End: 2023-05-16

## 2023-05-16 ENCOUNTER — TELEPHONE (OUTPATIENT)
Dept: INTERNAL MEDICINE CLINIC | Facility: CLINIC | Age: 63
End: 2023-05-16

## 2023-05-16 DIAGNOSIS — D64.9 SYMPTOMATIC ANEMIA: Primary | ICD-10-CM

## 2023-05-16 NOTE — TELEPHONE ENCOUNTER
You advised patient to call in if she had any issues setting up an appointment with Hematology/Oncology  She was given an appointment for 6/2/23 but she reports she is still having dizziness in the mornings and nausea  She asked if you think she need another blood infusion?

## 2023-05-16 NOTE — TELEPHONE ENCOUNTER
Patient Call    Who are you speaking with? Patient    If it is not the patient, are they listed on an active communication consent form? N/A   What is the reason for this call? Patient calling in wanting to know if Dr Romero Trevino had called our office in regards to getting a sooner appointment  I informed the patient that our records do not indicate that he had called our office  Does this require a call back? No   If a call back is required, please list best call back number N/A   If a call back is required, advise that a message will be forwarded to their care team and someone will return their call as soon as possible  Did you relay this information to the patient?  N/A

## 2023-05-16 NOTE — TELEPHONE ENCOUNTER
Her last hemoglobin was not that low, will recheck to see how it is right now, order entered, let patient know

## 2023-05-19 ENCOUNTER — TELEPHONE (OUTPATIENT)
Dept: PSYCHIATRY | Facility: CLINIC | Age: 63
End: 2023-05-19

## 2023-05-19 LAB
BASOPHILS # BLD AUTO: 29 CELLS/UL (ref 0–200)
BASOPHILS NFR BLD AUTO: 0.5 %
EOSINOPHIL # BLD AUTO: 251 CELLS/UL (ref 15–500)
EOSINOPHIL NFR BLD AUTO: 4.4 %
HCT VFR BLD AUTO: 39.2 % (ref 35–45)
HGB BLD-MCNC: 11.4 G/DL (ref 11.7–15.5)
LYMPHOCYTES # BLD AUTO: 644 CELLS/UL (ref 850–3900)
LYMPHOCYTES NFR BLD AUTO: 11.3 %
MCH RBC QN AUTO: 20.3 PG (ref 27–33)
MCHC RBC AUTO-ENTMCNC: 29.1 G/DL (ref 32–36)
MCV RBC AUTO: 69.9 FL (ref 80–100)
MONOCYTES # BLD AUTO: 314 CELLS/UL (ref 200–950)
MONOCYTES NFR BLD AUTO: 5.5 %
NEUTROPHILS # BLD AUTO: 4463 CELLS/UL (ref 1500–7800)
NEUTROPHILS NFR BLD AUTO: 78.3 %
PLATELET # BLD AUTO: 368 THOUSAND/UL (ref 140–400)
PMV BLD REES-ECKER: 8.7 FL (ref 7.5–12.5)
RBC # BLD AUTO: 5.61 MILLION/UL (ref 3.8–5.1)
WBC # BLD AUTO: 5.7 THOUSAND/UL (ref 3.8–10.8)

## 2023-05-21 NOTE — PROGRESS NOTES
Progress Note - Medical Toxicology  Bob Dakin 61 y o  female MRN: [id number]  Unit/Bed#: MICU 10 Encounter: [id number]            Reason for current consult: AMS/hyperthermia, NMS vs serotonin syndrome    ASSESSMENT:  1  Fever (resolved)  2  Encepholopathy  3  Rhabdomyolysis  4  Elevated Lactic Acid  5  Leukocytosis  6  Elevated D-Dimer  7  Elevated AST  8  Hypokalemia    RECOMMENDATIONS:    An acute onset of hyperthermia with altered mental status and tremors would make me concern for serotonin syndrome, however, patient is not currently on any serotonergic agents  Patient has an old script for an SSRI that she had previously denied taking  The patient was also not administered any serotonergic antibiotics or opioid pain medications prior to operative procedure  After further investigation, it seems that patient has been having ataxia, difficulty walking over the past week, prior to the surgery  It is possible that patient was beginning to develop NMS during that period of time and now we see the autonomic instability, hyperthermia, encephalopathy  Either way, the patient's clinical course and clinical presentation is not classic for either hyperthermia syndrome  Today, patient is diaphoretic, tremulous, mildly rigid in her lower extremities only, and somewhat confused  Patient is hypertensive, tachycardic  1   Recommend more aggressive benzodiazepine schedule for control of HR, rigidity  2  Continue IV fluids  3  Repletion of electrolytes per primary team  4  Due to the non-classic nature of this presentation, still strongly recommend looking for other causes of AMS      Subjective:   Patient complains of thirst     Objective:   Overnight, patient received 2 additional doses of 10 mg Valium with improvement in rigidity  Patient is still noted to be tremulous  CK is downtrending  Patient's CK peaked on 1/3 at [address]  Her last checked CK is 11,305        Physical Exam    Vitals: Blood pressure 131/70, pulse (!) 120, temperature 100 °F (37 8 °C), temperature source Rectal, resp  rate 21, height 5' 3" (1 6 m), weight 74 6 kg (164 lb 7 4 oz), SpO2 93 %  Intake/Output Summary (Last 24 hours) at 1/4/2020 1241  Last data filed at 1/4/2020 1208  Gross per 24 hour   Intake 5774 17 ml   Output 2060 ml   Net 3714 17 ml       Physical Exam:   General Appearance: shaky, alert, no distress, pleasant affect, cooperative, skin warm, dry, and pink  Answers basic questions, but trails off  Unable to have a full conversation  Eyes: conjunctivae and corneas clear  4 mm sluggish PERRL, EOM's intact  sclerae normal   Ears: external inspection of ears show no abnormality  Nose: normal   Mouth: normal  Mucous membranes dry  Neck: Neck supple    Heart: tachycardic rate and regular rhythm, no murmurs, clicks, or gallops  Lungs: clear to auscultation  Abdomen: BS normal  Abdomen soft, non-tender  No palpable bladder  No masses or organomegaly  Skin: Skin color, texture, turgor normal  No rashes or lesions  Axilla WNL  Neuro: Gait normal  Reflexes normal and symmetric  Sensation and strength grossly normal and no clonus, no rigidity, no tremor  Mental Status: Appearance/Cooperation: oriented times 1 to self  Musculoskeletal: no obvious deformity        Invasive Devices     Peripheral Intravenous Line            Peripheral IV 01/04/20 Left;Ventral (anterior) Forearm less than 1 day    Peripheral IV 01/04/20 Right Wrist less than 1 day          Drain            Urethral Catheter Non-latex 16 Fr  1 day                Lab, Imaging and other studies: I have personally reviewed pertinent reports  Speaking Coherently

## 2023-05-22 DIAGNOSIS — F41.9 ANXIETY: ICD-10-CM

## 2023-05-22 RX ORDER — PAROXETINE 30 MG/1
60 TABLET, FILM COATED ORAL DAILY
Qty: 180 TABLET | Refills: 3 | Status: SHIPPED | OUTPATIENT
Start: 2023-05-22

## 2023-05-24 DIAGNOSIS — F41.1 GAD (GENERALIZED ANXIETY DISORDER): Chronic | ICD-10-CM

## 2023-05-24 DIAGNOSIS — R21 RASH: ICD-10-CM

## 2023-05-24 RX ORDER — ZIPRASIDONE HYDROCHLORIDE 80 MG/1
CAPSULE ORAL
Qty: 90 CAPSULE | Refills: 3 | Status: SHIPPED | OUTPATIENT
Start: 2023-05-24

## 2023-05-24 RX ORDER — TRIAMCINOLONE ACETONIDE 1 MG/G
CREAM TOPICAL
Qty: 160 G | Refills: 5 | Status: SHIPPED | OUTPATIENT
Start: 2023-05-24

## 2023-05-26 ENCOUNTER — TELEPHONE (OUTPATIENT)
Dept: INTERNAL MEDICINE CLINIC | Facility: CLINIC | Age: 63
End: 2023-05-26

## 2023-05-26 DIAGNOSIS — F25.1 SCHIZOAFFECTIVE DISORDER, DEPRESSIVE TYPE (HCC): ICD-10-CM

## 2023-05-26 DIAGNOSIS — F99 PSYCHIATRIC DISORDER: ICD-10-CM

## 2023-05-26 RX ORDER — QUETIAPINE 400 MG/1
400 TABLET, FILM COATED, EXTENDED RELEASE ORAL
Qty: 90 TABLET | Refills: 3 | Status: SHIPPED | OUTPATIENT
Start: 2023-05-26

## 2023-06-01 ENCOUNTER — TELEPHONE (OUTPATIENT)
Dept: GASTROENTEROLOGY | Facility: CLINIC | Age: 63
End: 2023-06-01

## 2023-06-01 NOTE — TELEPHONE ENCOUNTER
"Patients GI provider:  Dr Vinny Robins    Number to return call: [phone]    Reason for call: Pt calling stating she wants to discuss results of last scope  She wants to know if Dr Vinny Robins recommends her to a surgeon to have her hernia \"repaired\"       Scheduled procedure/appointment date if applicable: N/A      "

## 2023-06-02 ENCOUNTER — CONSULT (OUTPATIENT)
Dept: HEMATOLOGY ONCOLOGY | Facility: CLINIC | Age: 63
End: 2023-06-02

## 2023-06-02 VITALS
TEMPERATURE: 98.2 F | WEIGHT: 189.5 LBS | DIASTOLIC BLOOD PRESSURE: 82 MMHG | SYSTOLIC BLOOD PRESSURE: 140 MMHG | HEIGHT: 62 IN | RESPIRATION RATE: 16 BRPM | HEART RATE: 94 BPM | OXYGEN SATURATION: 95 % | BODY MASS INDEX: 34.87 KG/M2

## 2023-06-02 DIAGNOSIS — E53.8 FOLATE DEFICIENCY: ICD-10-CM

## 2023-06-02 DIAGNOSIS — D50.9 MICROCYTIC ANEMIA: ICD-10-CM

## 2023-06-02 DIAGNOSIS — D51.8 VITAMIN B12 DEFICIENCY (DIETARY) ANEMIA: Primary | ICD-10-CM

## 2023-06-02 DIAGNOSIS — D72.819 LEUKOPENIA, UNSPECIFIED TYPE: ICD-10-CM

## 2023-06-02 RX ORDER — LANOLIN ALCOHOL/MO/W.PET/CERES
400 CREAM (GRAM) TOPICAL DAILY
Qty: 30 TABLET | Refills: 0 | Status: SHIPPED | OUTPATIENT
Start: 2023-06-02

## 2023-06-02 RX ORDER — LANOLIN ALCOHOL/MO/W.PET/CERES
1000 CREAM (GRAM) TOPICAL DAILY
Qty: 90 TABLET | Refills: 1 | Status: SHIPPED | OUTPATIENT
Start: 2023-06-02

## 2023-06-02 NOTE — PROGRESS NOTES
Oncology Outpatient Consult Note  Bal Choe 58 y o  female MRN: @ Encounter: [id number]        Date:  6/2/2023        CC:  Microcytic Anemia      HPI:  Bal Choe is seen for initial consultation 6/2/2023 regarding microcytic anemia  Patient has a PMH of Schreiber's esophagus, hiatal hernia, esophagitis, chronic anemia  She was recently hospitalized on 4/28 for dizziness, cough and vomiting  She was found to have a Hgb of 6 6, ferritin 3, serum iron 13, % saturation 2, Vitamin B12 562, Folate 13 5  She was transfused 1U PRBCs and IV iron  Patient declined further evaluation from GI inpatient and was to follow up with her Gastroenterologist upon discharge  Isabella Low is c/o fatigue, dizziness, headaches  Denies PICA, RLS, blood in urine or stool  She is taking oral iron supplements daily  Recent labs: Hgb 11 4, MCV 69, WBC 5 7, Platelets 309  Patient had an EGD completed 11/2022 for her Schreiber's esophagus, no abnormal findings  Her last colonoscopy was 2020, found to have a polyp, otherwise, unremarkable  Family history:   Maternal Grandmother - pernicious anemia  Father - Esophageal cancer  Mother - uterine cancer    ROS: As stated in history of present illness otherwise her 14 point review of systems today was negative  ECOG PS: 0   Test Results:    Imaging: No results found      Labs:   Lab Results   Component Value Date    HCT 39 2 05/18/2023    HGB 11 4 (L) 05/18/2023    MCV 69 9 (L) 05/18/2023     05/18/2023    WBC 5 7 05/18/2023     Lab Results   Component Value Date    ALKPHOS 79 01/05/2023    ALT 15 01/05/2023    AST 13 01/05/2023    BILITOT 0 2 06/01/2017    BUN 10 04/29/2023    CALCIUM 8 9 04/29/2023     04/29/2023    CO2 27 04/29/2023    CORRECTEDCA 9 4 11/16/2022    CREATININE 0 72 04/29/2023    EGFR 90 04/29/2023    GLUCOSE 155 (H) 01/02/2020    GLUF 126 (H) 02/21/2022    K 3 9 04/29/2023     06/01/2017    PROT 7 0 06/01/2017     Lab Results   Component Value Date    FERRITIN 3 (L) 04/28/2023    IRON 13 (L) 04/28/2023    IRON 13 (L) 04/28/2023    TIBC 557 (H) 04/28/2023     Lab Results   Component Value Date    SLXCRFUO42 562 04/28/2023     Active Problems:   Patient Active Problem List   Diagnosis   • Lumbar radiculopathy   • DDD (degenerative disc disease), lumbar   • Spondylolisthesis at L4-L5 level   • Localized osteoporosis with current pathological fracture with routine healing   • Spinal stenosis of lumbar region with neurogenic claudication   • Lumbar spondylosis   • T12 compression fracture ([facility])   • Synovial cyst of lumbar facet joint   • Anxiety   • Bulimia nervosa in remission   • Constipation   • Acquired hypothyroidism   • Other fatigue   • Serotonin syndrome   • Schizoaffective disorder ([facility])   • Psychiatric disorder   • Dermal hypersensitivity reaction   • Elevated BP without diagnosis of hypertension   • Essential hypertension   • Right hip pain   • Chronic bilateral low back pain without sciatica   • Severe iron deficiency anemia    • Preop exam for internal medicine   • NMS (neuroleptic malignant syndrome)   • Hypokalemia   • Fall at home   • Esophagitis   • Hyponatremia   • Problem related to living arrangement   • Dizziness   • Fever   • Acute non-recurrent maxillary sinusitis   • Iron deficiency anemia   • Multiple excoriations   • Rash   • Cellulitis of left upper extremity   • Erosive esophagitis   • Melena   • Hiatal hernia   • Polyp of colon   • Word finding difficulty   • SIRS (systemic inflammatory response syndrome) ([facility])   • Hyperlipidemia   • Nausea and vomiting   • S/P tooth extraction   • Upper GI bleed   • Pruritus   • Self neglect   • Microcytic anemia   • Schreiber's esophagus   • Obesity, Class I, BMI 30-34 9   • Acute encephalopathy   • Thrombocytosis   • Abdominal pain   • Stress incontinence   • Ulcer of toe, chronic, left, with unspecified severity ([facility])   • Rheumatoid arthritis involving both feet, unspecified whether rheumatoid factor present ([facility] )   • Viral bronchitis   • Symptomatic anemia       Past Medical History:   Past Medical History:   Diagnosis Date   • Anemia    • Anxiety    • Arthritis    • Back pain at L4-L5 level    • Schreiber esophagus    • Bulimia    • Colon polyp    • Disease of thyroid gland     hypothyroid   • GERD (gastroesophageal reflux disease)    • Hearing loss in left ear    • History of transfusion    • Hyperlipidemia    • Hypertension    • Hypothyroidism    • Leukopenia    • NMS (neuroleptic malignant syndrome) 01/03/2020   • Pneumonia    • RA (rheumatoid arthritis) ([facility] )     in feet   • Sciatica    • Seizure ([facility] )     due to medication mix up 8/2018 only one historically   • Skin spots, red     lower right arm - poss from cat- no s/s infection   • Synovial cyst of lumbar spine    • Vertigo    • Wears dentures     full set   • Weight gain        Surgical History:   Past Surgical History:   Procedure Laterality Date   • BONE MARROW BIOPSY     • BREAST SURGERY      enlargement with implants   • CLOSED REDUCTION DISTAL FEMUR FRACTURE     • COLONOSCOPY     • COSMETIC SURGERY     • DENTAL SURGERY     • HIP ARTHROPLASTY Right 01/02/2020    Procedure: REVISION TOTAL HIP ARTHROPLASTY WITH REPAIR OF PARIPROSTHETIC FRACTURE - POSTERIOR APPROACH;  Surgeon: Amrita Dumont MD;  [facility];  Service: Orthopedics   • MT AMPUTATION METATARSAL W/TOE SINGLE Left 04/07/2023    Procedure: AMPUTATION TOE 4th;  Surgeon: Nery Dickinson DPM;  Location: [facility] MAIN OR;  Service: Podiatry   • MT ARTHRP ACETBLR/PROX FEM PROSTC AGRFT/ALGRFT Right 12/11/2019    Procedure: ARTHROPLASTY HIP TOTAL ANTERIOR;  Surgeon: Amrita uDmont MD;  Location[facility];  Service: Orthopedics   • MT ESOPHAGOGASTRODUODENOSCOPY TRANSORAL DIAGNOSTIC N/A 05/11/2021    Procedure: ESOPHAGOGASTRODUODENOSCOPY (EGD); Surgeon:  Melisa Veronica MD;  [facility];  Service: General   • MT LAPS RPR PARAESPHGL HRNA INCL FUNDPLSTY W/MESH N/A 05/11/2021    Procedure: REPAIR HERNIA PARAESOPHAGEAL  LAPAROSCOPIC;  Surgeon: Sherrie Marino MD;  [facility];  Service: General   • [place] UNLISTED PROCEDURE ESOPHAGUS N/A 05/11/2021    Procedure: FUNDOPLICATION TRANSORAL INCISIONLESS;  Surgeon: Eleni Summers MD;  [facility];  Service: Gastroenterology   • RHINOPLASTY     • SINUS SURGERY     • TOE AMPUTATION Left     2023 4th toe   • TRANSORAL INCISIONLESS FUNDOPLICATION (TIF)  03/45/4239       Family History:    Family History   Problem Relation Age of Onset   • Uterine cancer Mother    • Esophageal cancer Father    • Colon cancer Maternal Grandmother        Cancer-related family history includes Colon cancer in her maternal grandmother; Esophageal cancer in her father; Uterine cancer in her mother  Social History:   Social History     Socioeconomic History   • Marital status: Single     Spouse name: Not on file   • Number of children: Not on file   • Years of education: Not on file   • Highest education level: Not on file   Occupational History   • Not on file   Tobacco Use   • Smoking status: Never   • Smokeless tobacco: Never   Vaping Use   • Vaping Use: Never used   Substance and Sexual Activity   • Alcohol use: Not Currently   • Drug use: Not Currently   • Sexual activity: Not Currently   Other Topics Concern   • Not on file   Social History Narrative    Family disruption death of family member parent, per [employer]     Social Determinants of Health     Financial Resource Strain: Medium Risk (1/4/2023)    Overall Financial Resource Strain (CARDIA)    • Difficulty of Paying Living Expenses: Somewhat hard   Food Insecurity: No Food Insecurity (12/20/2022)    Hunger Vital Sign    • Worried About Running Out of Food in the Last Year: Never true    • Ran Out of Food in the Last Year: Never true   Transportation Needs: No Transportation Needs (1/4/2023)    PRAPARE - Transportation    • Lack of Transportation (Medical):  No • Lack of Transportation (Non-Medical): No   Physical Activity: Not on file   Stress: Stress Concern Present (5/18/2021)    [address]    • Feeling of Stress : To some extent   Social Connections: Not on file   Intimate Partner Violence: Not on file   Housing Stability: Low Risk  (12/20/2022)    Housing Stability Vital Sign    • Unable to Pay for Housing in the Last Year: No    • Number of Places Lived in the Last Year: 1    • Unstable Housing in the Last Year: No       Current Medications:   Current Outpatient Medications   Medication Sig Dispense Refill   • amLODIPine (NORVASC) 5 mg tablet TAKE 1 TABLET (5 MG TOTAL) BY MOUTH DAILY   90 tablet 3   • ferrous sulfate 325 (65 Fe) mg tablet Take 1 tablet (325 mg total) by mouth 2 (two) times a day with meals 60 tablet 0   • folic acid (FOLVITE) 836 mcg tablet Take 1 tablet (400 mcg total) by mouth daily 30 tablet 0   • levothyroxine 25 mcg tablet TAKE 1 TABLET BY MOUTH EVERY DAY 90 tablet 3   • LORazepam (ATIVAN) 2 mg tablet Take 1 tablet (2 mg total) by mouth every 6 (six) hours as needed for anxiety 120 tablet 1   • ondansetron (ZOFRAN) 4 mg tablet Take 1 tablet (4 mg total) by mouth every 8 (eight) hours as needed for nausea or vomiting 20 tablet 5   • pantoprazole (PROTONIX) 40 mg tablet Take 1 tablet (40 mg total) by mouth 2 (two) times a day 180 tablet 3   • PARoxetine (PAXIL) 30 mg tablet Take 2 tablets (60 mg total) by mouth in the morning 180 tablet 3   • QUEtiapine (SEROquel XR) 400 mg 24 hr tablet Take 1 tablet (400 mg total) by mouth daily at bedtime 90 tablet 3   • sucralfate (CARAFATE) 1 g tablet TAKE 1 TABLET (1 G TOTAL) BY MOUTH 3 (THREE) TIMES A DAY WITH MEALS 270 tablet 3   • triamcinolone (KENALOG) 0 1 % cream APPLY TOPICALLY 2 TIMES A DAY AS NEEDED FOR RASH  160 g 5   • vitamin B-12 (VITAMIN B-12) 1,000 mcg tablet Take 1 tablet (1,000 mcg total) by mouth daily 90 tablet 1   • ziprasidone (GEODON) 80 mg capsule TAKE 1 CAPSULE BY MOUTH EVERY DAY 90 capsule 3     No current facility-administered medications for this visit  Allergies: Allergies   Allergen Reactions   • Risperdal [Risperidone] Shortness Of Breath     Rapid heart beat, SOB   • Zyprexa [Olanzapine] Shortness Of Breath     Rapid heartbeat   • Latex Rash     Band aids, adhesives wears normal underwear, can eat bananas  USE PAPER TAPE         Physical Exam:    Body surface area is 1 87 meters squared  Wt Readings from Last 3 Encounters:   06/02/23 86 kg (189 lb 8 oz)   05/11/23 83 9 kg (185 lb)   05/02/23 79 4 kg (175 lb)        Temp Readings from Last 3 Encounters:   06/02/23 98 2 °F (36 8 °C) (Temporal)   04/29/23 98 2 °F (36 8 °C) (Oral)   04/07/23 (!) 96 8 °F (36 °C) (Temporal)        BP Readings from Last 3 Encounters:   06/02/23 140/82   05/11/23 125/76   04/29/23 141/77         Pulse Readings from Last 3 Encounters:   06/02/23 94   04/29/23 98   04/20/23 (!) 108     @LASTSAO2(3)@    Physical Exam     Constitutional   General appearance: No acute distress, well appearing and well nourished  Eyes   Conjunctiva and lids: No swelling, erythema or discharge  Pupils and irises: Equal, round and reactive to light  Ears, Nose, Mouth, and Throat   External inspection of ears and nose: Normal     Nasal mucosa, septum, and turbinates: Normal without edema or erythema  Oropharynx: Normal with no erythema, edema, exudate or lesions  Pulmonary   Respiratory effort: No increased work of breathing or signs of respiratory distress  Auscultation of lungs: Clear to auscultation  Cardiovascular   Palpation of heart: Normal PMI, no thrills  Auscultation of heart: Normal rate and rhythm, normal S1 and S2, without murmurs  Examination of extremities for edema and/or varicosities: Normal     Carotid pulses: Normal     Abdomen   Abdomen: Non-tender, no masses      Liver and spleen: No hepatomegaly or splenomegaly  Lymphatic   Palpation of lymph nodes in neck: No lymphadenopathy  Musculoskeletal   Gait and station: Normal     Digits and nails: Normal without clubbing or cyanosis  Inspection/palpation of joints, bones, and muscles: Normal     Skin   Skin and subcutaneous tissue: Normal without rashes or lesions  Neurologic   Cranial nerves: Cranial nerves 2-12 intact  Sensation: No sensory loss  Psychiatric   Orientation to person, place, and time: Normal     Mood and affect: Normal           Assessment/ Plan:  Discussed patient's hospital course and recommended she be evaluated by GI for possible colonoscopy  Patient hesitant as she is denies blood in her urine/stool  Advised her that a drastic drop in her hemoglobin requires further evaluation to rule out a bleed  I will reach out to GI to schedule an appointment with Dr Cooper Left  In the meantime, we will repeat iron studies and CBC  Patient will get labs completed on Monday and I will call her back when I receive the results to determine plan  Patient agreeable  I spent 60 minutes in chart review, face to face counseling, coordination of care, and documentation

## 2023-06-02 NOTE — TELEPHONE ENCOUNTER
Lmm for pt to call back and schedule ov with Dr Ludin Salazar on a held appt  for 6/9/2023  Please put phone call thru to [place] office   Thanks Neyda Santillan

## 2023-06-06 ENCOUNTER — TELEPHONE (OUTPATIENT)
Dept: HEMATOLOGY ONCOLOGY | Facility: CLINIC | Age: 63
End: 2023-06-06

## 2023-06-06 DIAGNOSIS — D50.0 IRON DEFICIENCY ANEMIA DUE TO CHRONIC BLOOD LOSS: Primary | ICD-10-CM

## 2023-06-06 DIAGNOSIS — D50.9 MICROCYTIC ANEMIA: ICD-10-CM

## 2023-06-06 LAB
BASOPHILS # BLD AUTO: 41 CELLS/UL (ref 0–200)
BASOPHILS NFR BLD AUTO: 1.4 %
EOSINOPHIL # BLD AUTO: 290 CELLS/UL (ref 15–500)
EOSINOPHIL NFR BLD AUTO: 10 %
FERRITIN SERPL-MCNC: 19 NG/ML (ref 16–288)
HCT VFR BLD AUTO: 39.2 % (ref 35–45)
HGB BLD-MCNC: 11.9 G/DL (ref 11.7–15.5)
IRON SATN MFR SERPL: 6 % (CALC) (ref 16–45)
IRON SERPL-MCNC: 31 MCG/DL (ref 45–160)
LYMPHOCYTES # BLD AUTO: 589 CELLS/UL (ref 850–3900)
LYMPHOCYTES NFR BLD AUTO: 20.3 %
MCH RBC QN AUTO: 22.3 PG (ref 27–33)
MCHC RBC AUTO-ENTMCNC: 30.4 G/DL (ref 32–36)
MCV RBC AUTO: 73.4 FL (ref 80–100)
MONOCYTES # BLD AUTO: 220 CELLS/UL (ref 200–950)
MONOCYTES NFR BLD AUTO: 7.6 %
NEUTROPHILS # BLD AUTO: 1760 CELLS/UL (ref 1500–7800)
NEUTROPHILS NFR BLD AUTO: 60.7 %
PLATELET # BLD AUTO: 276 THOUSAND/UL (ref 140–400)
PMV BLD REES-ECKER: 8.8 FL (ref 7.5–12.5)
RBC # BLD AUTO: 5.34 MILLION/UL (ref 3.8–5.1)
TIBC SERPL-MCNC: 479 MCG/DL (CALC) (ref 250–450)
WBC # BLD AUTO: 2.9 THOUSAND/UL (ref 3.8–10.8)

## 2023-06-06 RX ORDER — SODIUM CHLORIDE 9 MG/ML
20 INJECTION, SOLUTION INTRAVENOUS ONCE
OUTPATIENT
Start: 2023-06-20

## 2023-06-06 NOTE — TELEPHONE ENCOUNTER
While we try to accommodate patient requests, our priority is to schedule treatment according to Doctor's orders and site availability  Does the Provider use the intake sheet or checkout note? What would be a preferred day of the week that would work best for your infusion appointment? Wednesdays   Do you prefer mornings or afternoons for your appointments? Mornings   Are there any days or dates that do not work for your schedule, including any upcoming vacations? N/A  We are going to try our best to schedule you at the infusion center closest to your home  In the event that we are unable to what would be your next preferred infusion site or sites? 1  [facility]   2  [facility]    Do you have transportation to take you to all of your appointments?  Yes   Would you like the infusion center to draw labs from your port? (disregard if patient doesn't have a port or need labs for infusion appointment)

## 2023-06-06 NOTE — TELEPHONE ENCOUNTER
Please schedule iron treatments  Please inform patient of her appt dates/times  Please also let her know I changed her follow up appt with me to 9/20 at 11am   I'll mail her the follow up labs  Thank you! Discussed patient's labs, informed her of her Ferritin 19, % saturation 6, serum iron 31, Hgb 11 9, MCV 73 4  Informed her we will be scheduling IV iron treatments, Venofer weekly x 4 doses  We discussed this at our office visit on Friday  Patient educated about the iron product, administration and common adverse effects including but not limited to: Anaphylaxis/allergic reaction, arthralgias/myalgias, nausea; agrees to proceed with treatment  Informed her it's taking insurance 2 weeks to approve it  In addition, I'll change her appt to come see me in 3 months  Patient agreeable

## 2023-06-07 ENCOUNTER — APPOINTMENT (OUTPATIENT)
Dept: RADIOLOGY | Age: 63
End: 2023-06-07
Payer: MEDICARE

## 2023-06-07 ENCOUNTER — CONSULT (OUTPATIENT)
Dept: PAIN MEDICINE | Facility: CLINIC | Age: 63
End: 2023-06-07
Payer: MEDICARE

## 2023-06-07 VITALS
WEIGHT: 184 LBS | BODY MASS INDEX: 33.86 KG/M2 | SYSTOLIC BLOOD PRESSURE: 133 MMHG | HEIGHT: 62 IN | HEART RATE: 108 BPM | DIASTOLIC BLOOD PRESSURE: 81 MMHG

## 2023-06-07 DIAGNOSIS — M47.816 LUMBAR SPONDYLOSIS: Primary | ICD-10-CM

## 2023-06-07 DIAGNOSIS — G89.29 CHRONIC BILATERAL LOW BACK PAIN WITHOUT SCIATICA: ICD-10-CM

## 2023-06-07 DIAGNOSIS — M54.50 CHRONIC BILATERAL LOW BACK PAIN WITHOUT SCIATICA: ICD-10-CM

## 2023-06-07 DIAGNOSIS — M47.816 LUMBAR SPONDYLOSIS: ICD-10-CM

## 2023-06-07 DIAGNOSIS — M43.16 SPONDYLOLISTHESIS AT L4-L5 LEVEL: ICD-10-CM

## 2023-06-07 DIAGNOSIS — M48.061 SPINAL STENOSIS OF LUMBAR REGION, UNSPECIFIED WHETHER NEUROGENIC CLAUDICATION PRESENT: ICD-10-CM

## 2023-06-07 PROCEDURE — 99204 OFFICE O/P NEW MOD 45 MIN: CPT | Performed by: ANESTHESIOLOGY

## 2023-06-07 PROCEDURE — 72114 X-RAY EXAM L-S SPINE BENDING: CPT

## 2023-06-07 NOTE — PROGRESS NOTES
Assessment  1  Lumbar spondylosis    2  Spondylolisthesis at L4-L5 level    3  Spinal stenosis of lumbar region, unspecified whether neurogenic claudication present        Plan  58-year-old female with a history of lumbar spondylosis, stenosis, lumbar radiculopathy, osteoarthritis of the right hip, last seen by our practice in November 2019, presenting for interval consultation regarding a 5-month history of axial and mechanical low back pain without any radicular symptoms into her lower extremities  She denies any trauma or inciting event  The patient has not done any recent formal physical therapy or chiropractic treatment  She does not really take any Tylenol or NSAIDs as these have not been effective in the past and prefers to avoid medications if possible  She has had excellent relief with bilateral L4-5 and L5-S1 facet joint RFA 2018 which  lasted up until about 5 months ago until she had her recent flare  Patient's back pain seems to be mostly myofascial and facet mediated in nature  No evidence of radiculopathy  There may be also be a SI mediated component contributing to her back pain  1  as for text I will order an updated flexion-extension x-ray of the lumbar spine  2  Physical therapy will be prescribed for her lumbar complaints  3  May consider bilateral L3-5 medial branch blocks  If the patient has a favorable result we will proceed with repeat RFA  4  I will follow-up with the patient in 6 weeks      My impressions and treatment recommendations were discussed in detail with the patient who verbalized understanding and had no further questions  Discharge instructions were provided  I personally saw and examined the patient and I agree with the above discussed plan of care  No orders of the defined types were placed in this encounter  No orders of the defined types were placed in this encounter        History of Present Illness    Janene Hatchet is a 58 y o  female with a history of lumbar spondylosis, stenosis, lumbar radiculopathy, osteoarthritis of the right hip, last seen by our practice in November 2019, presenting for interval consultation regarding a 5-month history of axial and mechanical low back pain without any radicular symptoms into her lower extremities  He denies any bladder or bowel incontinence or saddle anesthesia  She denies any trauma or inciting event  The patient has not done any recent formal physical therapy or chiropractic treatment  She does not really take any Tylenol or NSAIDs as these have not been effective in the past and prefers to avoid medications if possible  She has had excellent relief with bilateral L4-5 and L5-S1 facet joint RFA 2018 which  lasted up until about 5 months ago until she had her recent flare  The patient rates her pain a 9 out of 10 and the pain is nearly constant  The pain does not follow any particular pattern throughout the day  The pain is described as sharp  The pain is increased with standing, bending, walking, exercise, coughing, sneezing, and bowel movements  The pain is alleviated with lying down, sitting, and relaxation  Other than as stated above, the patient denies any interval changes in medications, medical condition, mental condition, symptoms, or allergies since the last office visit  I have personally reviewed and/or updated the patient's past medical history, past surgical history, family history, social history, current medications, allergies, and vital signs today  Review of Systems   Constitutional: Positive for unexpected weight change  Negative for fever  HENT: Positive for hearing loss  Negative for trouble swallowing  Eyes: Negative for visual disturbance  Respiratory: Positive for shortness of breath  Negative for wheezing  Cardiovascular: Negative for chest pain and palpitations  Gastrointestinal: Positive for abdominal pain, nausea and vomiting   Negative for constipation and diarrhea  Endocrine: Negative for cold intolerance, heat intolerance and polydipsia  Genitourinary: Negative for difficulty urinating and frequency  Musculoskeletal: Negative for arthralgias, gait problem, joint swelling and myalgias  Skin: Positive for rash  Neurological: Negative for dizziness, seizures, syncope, weakness and headaches  Hematological: Does not bruise/bleed easily  Psychiatric/Behavioral: Positive for decreased concentration  Negative for dysphoric mood  Anxiety, depression   All other systems reviewed and are negative        Patient Active Problem List   Diagnosis   • Lumbar radiculopathy   • DDD (degenerative disc disease), lumbar   • Spondylolisthesis at L4-L5 level   • Localized osteoporosis with current pathological fracture with routine healing   • Spinal stenosis of lumbar region with neurogenic claudication   • Lumbar spondylosis   • T12 compression fracture ([facility])   • Synovial cyst of lumbar facet joint   • Anxiety   • Bulimia nervosa in remission   • Constipation   • Acquired hypothyroidism   • Other fatigue   • Serotonin syndrome   • Schizoaffective disorder ([facility])   • Psychiatric disorder   • Dermal hypersensitivity reaction   • Elevated BP without diagnosis of hypertension   • Essential hypertension   • Right hip pain   • Chronic bilateral low back pain without sciatica   • Severe iron deficiency anemia    • Preop exam for internal medicine   • NMS (neuroleptic malignant syndrome)   • Hypokalemia   • Fall at home   • Esophagitis   • Hyponatremia   • Problem related to living arrangement   • Dizziness   • Fever   • Acute non-recurrent maxillary sinusitis   • Iron deficiency anemia   • Multiple excoriations   • Rash   • Cellulitis of left upper extremity   • Erosive esophagitis   • Melena   • Hiatal hernia   • Polyp of colon   • Word finding difficulty   • SIRS (systemic inflammatory response syndrome) ([facility])   • Hyperlipidemia   • Nausea and vomiting   • S/P tooth extraction   • Upper GI bleed   • Pruritus   • Self neglect   • Microcytic anemia   • Schreiber's esophagus   • Obesity, Class I, BMI 30-34 9   • Acute encephalopathy   • Thrombocytosis   • Abdominal pain   • Stress incontinence   • Ulcer of toe, chronic, left, with unspecified severity (HCC)   • Rheumatoid arthritis involving both feet, unspecified whether rheumatoid factor present ([facility] )   • Viral bronchitis   • Symptomatic anemia       Past Medical History:   Diagnosis Date   • Anemia    • Anxiety    • Arthritis    • Back pain at L4-L5 level    • Schreiber esophagus    • Bulimia    • Colon polyp    • Disease of thyroid gland     hypothyroid   • GERD (gastroesophageal reflux disease)    • Hearing loss in left ear    • History of transfusion    • Hyperlipidemia    • Hypertension    • Hypothyroidism    • Leukopenia    • NMS (neuroleptic malignant syndrome) 01/03/2020   • Pneumonia    • RA (rheumatoid arthritis) ([facility] )     in feet   • Sciatica    • Seizure ([facility] )     due to medication mix up 8/2018 only one historically   • Skin spots, red     lower right arm - poss from cat- no s/s infection   • Synovial cyst of lumbar spine    • Vertigo    • Wears dentures     full set   • Weight gain        Past Surgical History:   Procedure Laterality Date   • BONE MARROW BIOPSY     • BREAST SURGERY      enlargement with implants   • CLOSED REDUCTION DISTAL FEMUR FRACTURE     • COLONOSCOPY     • COSMETIC SURGERY     • DENTAL SURGERY     • HIP ARTHROPLASTY Right 01/02/2020    Procedure: REVISION TOTAL HIP ARTHROPLASTY WITH REPAIR OF PARIPROSTHETIC FRACTURE - POSTERIOR APPROACH;  Surgeon: Rolando Bauer MD;  Location:  MAIN OR;  Service: Orthopedics   • MA AMPUTATION METATARSAL W/TOE SINGLE Left 04/07/2023    Procedure: AMPUTATION TOE 4th;  Surgeon: Brodie Collins DPM;  Location: [address] [place] [address] MAIN OR;  Service: Podiatry   • MA ARTHRP ACETBLR/PROX FEM PROSTC AGRFT/ALGRFT Right 12/11/2019    Procedure: ARTHROPLASTY HIP TOTAL ANTERIOR;  Surgeon: Jarrod Ramos MD;  Location: [facility] MAIN OR;  Service: Orthopedics   • MT ESOPHAGOGASTRODUODENOSCOPY TRANSORAL DIAGNOSTIC N/A 05/11/2021    Procedure: ESOPHAGOGASTRODUODENOSCOPY (EGD); Surgeon: Reginaldo Plata MD;  Location: [facility] MAIN OR;  Service: General   • MT LAPS RPR PARAESPHGL HRNA INCL FUNDPLSTY [address] N/A 05/11/2021    Procedure: REPAIR HERNIA PARAESOPHAGEAL  LAPAROSCOPIC;  Surgeon: Reginaldo Plata MD;  Location: [facility] MAIN OR;  Service: General   • MT UNLISTED PROCEDURE ESOPHAGUS N/A 05/11/2021    Procedure: FUNDOPLICATION TRANSORAL INCISIONLESS;  Surgeon: Lauro Whyte MD;  Location: [facility] MAIN OR;  Service: Gastroenterology   • RHINOPLASTY     • SINUS SURGERY     • TOE AMPUTATION Left     2023 4th toe   • TRANSORAL INCISIONLESS FUNDOPLICATION (TIF)  40/76/1023       Family History   Problem Relation Age of Onset   • Uterine cancer Mother    • Esophageal cancer Father    • Colon cancer Maternal Grandmother        Social History     Occupational History   • Not on file   Tobacco Use   • Smoking status: Never   • Smokeless tobacco: Never   Vaping Use   • Vaping Use: Never used   Substance and Sexual Activity   • Alcohol use: Not Currently   • Drug use: Not Currently   • Sexual activity: Not Currently       Current Outpatient Medications on File Prior to Visit   Medication Sig   • amLODIPine (NORVASC) 5 mg tablet TAKE 1 TABLET (5 MG TOTAL) BY MOUTH DAILY     • ferrous sulfate 325 (65 Fe) mg tablet Take 1 tablet (325 mg total) by mouth 2 (two) times a day with meals   • folic acid (FOLVITE) 480 mcg tablet Take 1 tablet (400 mcg total) by mouth daily   • levothyroxine 25 mcg tablet TAKE 1 TABLET BY MOUTH EVERY DAY   • LORazepam (ATIVAN) 2 mg tablet Take 1 tablet (2 mg total) by mouth every 6 (six) hours as needed for anxiety   • ondansetron (ZOFRAN) 4 mg tablet Take 1 tablet (4 mg total) by mouth every 8 (eight) hours as needed for nausea or vomiting   • pantoprazole (PROTONIX) 40 mg tablet Take 1 tablet (40 mg total) by "mouth 2 (two) times a day   • PARoxetine (PAXIL) 30 mg tablet Take 2 tablets (60 mg total) by mouth in the morning   • QUEtiapine (SEROquel XR) 400 mg 24 hr tablet Take 1 tablet (400 mg total) by mouth daily at bedtime   • sucralfate (CARAFATE) 1 g tablet TAKE 1 TABLET (1 G TOTAL) BY MOUTH 3 (THREE) TIMES A DAY WITH MEALS   • triamcinolone (KENALOG) 0 1 % cream APPLY TOPICALLY 2 TIMES A DAY AS NEEDED FOR RASH  • vitamin B-12 (VITAMIN B-12) 1,000 mcg tablet Take 1 tablet (1,000 mcg total) by mouth daily   • ziprasidone (GEODON) 80 mg capsule TAKE 1 CAPSULE BY MOUTH EVERY DAY     No current facility-administered medications on file prior to visit  Allergies   Allergen Reactions   • Risperdal [Risperidone] Shortness Of Breath     Rapid heart beat, SOB   • Zyprexa [Olanzapine] Shortness Of Breath     Rapid heartbeat   • Latex Rash     Band aids, adhesives wears normal underwear, can eat bananas  USE PAPER TAPE       Physical Exam    /81   Pulse (!) 108   Ht 5' 2\" (1 575 m)   Wt 83 5 kg (184 lb)   BMI 33 65 kg/m²     Constitutional: normal, well developed, well nourished, alert, in no distress and non-toxic and no overt pain behavior  Eyes: anicteric  HEENT: grossly intact  Neck: supple, symmetric, trachea midline and no masses   Pulmonary:even and unlabored  Cardiovascular:No edema or pitting edema present  Skin:Normal without rashes or lesions and well hydrated  Psychiatric:Mood and affect appropriate  Neurologic:Cranial Nerves II-XII grossly intact  Musculoskeletal:normal gait  Bilateral patellar and Achilles reflexes were 2 out of 4 and symmetrical   No clonus was noted bilaterally  Bilateral lumbar paraspinal musculature tender to palpation from L3-S1  Bilateral SI joints minimally tender to palpation  Bilateral lower extremity strength 5 out of 5 in all muscle groups  Sensation intact to light touch in L3-S1 dermatomes bilaterally  Negative straight leg raise bilaterally    Positive " Brien's test bilaterally  Positive lumbar facet loading maneuvers bilaterally  Imaging  MRI LUMBAR SPINE WITHOUT CONTRAST     INDICATION: M71 38: Other bursal cyst, other site  History taken directly from the electronic ordering system  Follow-up evaluation  Low back pain      COMPARISON:  8/15/2016     TECHNIQUE:  Sagittal T1, sagittal T2, sagittal inversion recovery, axial T1 and axial T2, coronal T2        IMAGE QUALITY:  Diagnostic     FINDINGS:     ALIGNMENT:  Mild to moderate levoscoliosis mid to lower lumbar spine is stable  Grade 1 anterolisthesis of L4 on L5 is also stable      MARROW SIGNAL:  Mild chronic superior endplate compression abnormality of T12 stable  Minimal scattered degenerative endplate changes elsewhere are stable  No bone marrow edema or focally suspicious marrow lesions      DISTAL CORD AND CONUS:  Normal size and signal within the distal cord and conus  The conus ends at the inferior endplate of P23 level      PARASPINAL SOFT TISSUES:  Prominent bladder distention is incompletely imaged with probable resultant physiologic fullness of the renal collecting systems      SACRUM:  Normal signal within the sacrum  No evidence of insufficiency or stress fracture      LOWER THORACIC DISC SPACES:  Normal disc height and signal   No disc herniation, canal stenosis or foraminal narrowing      LUMBAR DISC SPACES:     L1-L2:  Normal      L2-L3:  Normal      L3-L4:  Normal      L4-L5:  There is uncovering of the intervertebral discs space  Advanced facet hypertrophy noted  Significant interval involution of the previously present right-sided facet cyst now approximately 9 mm in maximal dimension, previously approximately 17   mm in maximal dimension  Associated mass effect on the thecal sac has diminished  There is moderate tricompartmental narrowing      L5-S1:  There is a diffuse disc bulge  There is bilateral facet hypertrophy    Minimal tricompartmental narrowing      IMPRESSION:        1     Grade 1 anterolisthesis of L4 on L5 and mild to moderate levoscoliosis mid lumbar spine stable  2  Involuting right-sided facet cyst at L4-5 with associated diminished mass effect on the thecal sac  3  Spondylotic changes elsewhere are stable  No new disc herniation  4   Prominent bladder distention and probable associated physiologic distention of the renal collecting system  Correlation for urostasis/chronic bladder outlet obstruction advised  Consider bladder ultrasound with post void residual for further   characterization, as clinically indicated

## 2023-06-08 NOTE — TELEPHONE ENCOUNTER
Dr Tripp Stern , this pt needs to be seen in [place] due to insurance and your first available is in August   Do you want to overbook and see pt sooner?  Thanks Cristina Brannon

## 2023-06-12 ENCOUNTER — TELEPHONE (OUTPATIENT)
Dept: PAIN MEDICINE | Facility: CLINIC | Age: 63
End: 2023-06-12

## 2023-06-12 ENCOUNTER — TREATMENT (OUTPATIENT)
Dept: PAIN MEDICINE | Facility: CLINIC | Age: 63
End: 2023-06-12

## 2023-06-12 ENCOUNTER — EVALUATION (OUTPATIENT)
Dept: PHYSICAL THERAPY | Facility: CLINIC | Age: 63
End: 2023-06-12
Payer: MEDICARE

## 2023-06-12 DIAGNOSIS — M48.061 SPINAL STENOSIS OF LUMBAR REGION, UNSPECIFIED WHETHER NEUROGENIC CLAUDICATION PRESENT: ICD-10-CM

## 2023-06-12 DIAGNOSIS — M47.816 LUMBAR SPONDYLOSIS: Primary | ICD-10-CM

## 2023-06-12 DIAGNOSIS — M54.50 CHRONIC BILATERAL LOW BACK PAIN WITHOUT SCIATICA: Primary | ICD-10-CM

## 2023-06-12 DIAGNOSIS — M43.16 SPONDYLOLISTHESIS AT L4-L5 LEVEL: ICD-10-CM

## 2023-06-12 DIAGNOSIS — G89.29 CHRONIC BILATERAL LOW BACK PAIN WITHOUT SCIATICA: Primary | ICD-10-CM

## 2023-06-12 DIAGNOSIS — M54.50 CHRONIC BILATERAL LOW BACK PAIN WITHOUT SCIATICA: ICD-10-CM

## 2023-06-12 DIAGNOSIS — G89.29 CHRONIC BILATERAL LOW BACK PAIN WITHOUT SCIATICA: ICD-10-CM

## 2023-06-12 PROCEDURE — 97112 NEUROMUSCULAR REEDUCATION: CPT

## 2023-06-12 PROCEDURE — 97161 PT EVAL LOW COMPLEX 20 MIN: CPT

## 2023-06-12 PROCEDURE — 97110 THERAPEUTIC EXERCISES: CPT

## 2023-06-12 NOTE — TELEPHONE ENCOUNTER
Please notify the patient x-ray of her lumbar spine demonstrates anterior shifting of the L4 vertebrae on top of the L5 vertebrae without evidence of instability  She does have arthritis throughout the lumbar spine  I can offer the patient bilateral L3-5 medial branch blocks as discussed in office    If she is agreeable, okay to schedule

## 2023-06-12 NOTE — PROGRESS NOTES
PT Evaluation     Today's date: 2023  Patient name: Mraia Eugenia Jennings  : 1960  MRN: [id number]  Referring provider: Ramos Boateng DO  Dx:   Encounter Diagnosis     ICD-10-CM    1  Lumbar spondylosis  M47 816 Ambulatory referral to Physical Therapy      2  Spondylolisthesis at L4-L5 level  M43 16 Ambulatory referral to Physical Therapy      3  Spinal stenosis of lumbar region, unspecified whether neurogenic claudication present  M48 061 Ambulatory referral to Physical Therapy      4  Chronic bilateral low back pain without sciatica  M54 50 Ambulatory referral to Physical Therapy    G89 29                      Assessment  Assessment details: Nidia Magallon is a 59 yo female presenting with complaints of LBP  Pt demonstrates decreased lumbar extension AROM and poor core stabilization leading to limitations with functional transitions and ADLs  Pt's clinical presentation aligns with extension directional preference  Pt would benefit from skilled physical therapy interventions in order to address the stated impairments, decrease pain with function and increase activity tolerance in order to improve quality of life  No further referral appears necessary at this time based on examination results  Prognosis is good given HEP compliance and PT 1-2/wk   Positive prognostic indicators include positive attitude toward recovery  Please contact me if you have any questions or recommendations  Thank you for the opportunity to share in Irene's care      Impairments: abnormal coordination, abnormal gait, abnormal muscle firing, abnormal muscle tone, abnormal or restricted ROM, activity intolerance, impaired physical strength, lacks appropriate home exercise program, pain with function, weight-bearing intolerance, poor posture  and poor body mechanics    Symptom irritability: moderateBarriers to therapy: None  Understanding of Dx/Px/POC: good   Prognosis: good    Goals  Short Term Goals:  Pt will report decreased levels of pain by at least 2 subjective ratings in 4 weeks  Pt will demonstrate improved ROM by at least 10 degrees in 4 weeks  Pt will demonstrate improved strength by ½ grade in 4 weeks  Pt will be able to stand for 30 minutes for ADLs in 4 weeks    Long Term Goals:  Pt will be independent with HEP in 8 weeks  Pt will be pain free with ADL's in 8 weeks  Pt will be able to perform 10 STS without UE support or difficulty in 8 weeks      Plan  Patient would benefit from: skilled physical therapy  Referral necessary: No  Planned modality interventions: low level laser therapy  Planned therapy interventions: abdominal trunk stabilization, balance/weight bearing training, body mechanics training, breathing training, functional ROM exercises, gait training, graded activity, graded exercise, home exercise program, joint mobilization, manual therapy, neuromuscular re-education, patient education, postural training, strengthening, stretching, therapeutic activities and therapeutic exercise  Frequency: 2x week  Duration in weeks: 12  Plan of Care beginning date: 6/12/2023  Plan of Care expiration date: 9/4/2023  Treatment plan discussed with: patient        Subjective Evaluation    History of Present Illness  Mechanism of injury: Pt reports for about 5 years now she has had low back pain  She had a branch block procedure that abolished all pain for a few years  Recently the pain has returned and has been progressively worsening  Pt would like to get another block but is required to trial PT first  Pt reports pain is centrally in low back  Denies radicular pain  Min pain with sitting  Pain is worst with functional transitions, especially after long duration sitting  Pt has no pain with standing initially, but does develop pain with static standing for greater than 20-30 minutes  Pt reports pain with ascending stairs  Little to no pain with descending stairs  Denies 5d's, 3n's   Pt denies night pain, unexplained weight changes, bowel/bladder changes            Recurrent probem    Quality of life: fair    Pain  Current pain ratin  At best pain ratin  At worst pain ratin  Quality: sharp  Relieving factors: rest  Aggravating factors: sitting, stair climbing, walking and lifting  Progression: no change    Social Support  Steps to enter house: yes  Stairs in house: no   Lives in: [facility]  Lives with: alone    Employment status: not working  Hand dominance: right    Treatments  Previous treatment: injection treatment  Patient Goals  Patient goals for therapy: decreased pain, increased motion, increased strength, independence with ADLs/IADLs and return to sport/leisure activities          Objective     Postural Observations  Seated posture: poor  Standing posture: poor  Correction of posture: has no consistent effect        Neurological Testing     Sensation     Lumbar   Left   Intact: light touch    Right   Intact: light touch    Additional Neurological Details  LE strength and sensation symmetrical throughout    Active Range of Motion     Lumbar   Flexion:  WFL  Extension:  with pain Restriction level: moderate  Left lateral flexion:  with pain Restriction level: minimal  Right lateral flexion:  with pain Restriction level: minimal  Left rotation:  WFL  Right rotation:  [facility]ing[facility]    Joint Play     Hypomobile: L3, L4, L5 and S1     Pain: L3, L4, L5 and S1   Mechanical Assessment    Cervical      Thoracic      Lumbar    Standing flexion: repeated movements   Pain location:no change  Pain intensity: worse  Standing extension: repeated movements  Pain intensity: better    Strength/Myotome Testing     Left Hip   Planes of Motion   Flexion: 4  Extension: 4-  Abduction: 4  Adduction: 4    Right Hip   Planes of Motion   Flexion: 4  Extension: 4-  Abduction: 4  Adduction: 4    Left Knee   Flexion: 4-  Extension: 4    Right Knee   Flexion: 4-  Extension: 4    Left Ankle/Foot   Dorsiflexion: 4  Plantar flexion: 4  Great toe extension: 4    Right Ankle/Foot   Dorsiflexion: 4  Plantar flexion: 4  Great toe extension: 4    Tests     Lumbar     Left   Positive crossed SLR  Right   Positive crossed SLR  Left Pelvic Girdle/Sacrum   Positive: active SLR test      Right Pelvic Girdle/Sacrum   Positive: active SLR test      Left Hip   Positive TAWANNA  Right Hip   Positive TAWANNA       General Comments:      Lumbar Comments  Pt demonstrates poor core stabilization with functional mobility  Sit to stand transition was smoothing and less painful following SUE in mechanical assessment             Precautions: Low hemoglobin with chronic fatigue, Cardiac history, Pulmonary history, Digestive history (use wedge pillow), history of cancer    Clinical diagnosis: extension directional preference, core stab  Manuals 6/12                                                                Neuro Re-Ed             TrA contraction 2x10            TrA supine march 2x10            TrA + SLR             Modified deadbug             Pball deadbug             Pallof press                          Ther Ex             SUE 2x10            Seated thoracic ext                                                                                           Ther Activity                                       Gait Training                                       Modalities

## 2023-06-13 NOTE — TELEPHONE ENCOUNTER
PRE OP INSTRUCTIONS:     -If you are on prescription blood thinners, you may have to hold the medication for several days before the procedure  Please call the office to    discuss medication holds at [phone]   -Do not eat or drink ONE HOUR prior to your procedure  If you are diabetic, may follow regular breakfast/lunch schedule and take usual    diabetic medications   -Lumbar( low back) procedure, please wear comfortable slacks/pants   -Cervical (neck) procedure, please wear a shirt/blouse that is easy to remove   -A  is required to take you home form your procedure   -Continue all to take prescribed medication the day of your procedure, including blood pressure medications   -If you are prescribe antibiotics, have an active infection or have an open wound, please contact the office at [phone]   -Please refrain from any vaccinations two weeks before and two weeks after injection   -Insurance authorization received in not a guarantee of payment per your insurance company's authorization disclaimer and it is    your responsibility to verify your benefits  -If you have any questions about the instructions, please call me at [phone]    Hold medication  x _ full days prior, last dose on _  Patient advised to hold medication from Date till their appointment at that time instructions to restart will be given  Patient stated verbal understanding  Aware that nursing will call her to review hold dates as well

## 2023-06-14 NOTE — TELEPHONE ENCOUNTER
Caller: mio    Doctor: luis    Reason for call: pt would like to talk to someone about her xray  She has more questions      Call back#: [phone]

## 2023-06-14 NOTE — TELEPHONE ENCOUNTER
I would like the patient to go back for at least 1 more session of physical therapy as the first session was just an evaluation  Now that they have an evaluation they can form a therapy program which hopefully will not exacerbate her pain    Please have her give the physical therapist feedback at her next session

## 2023-06-14 NOTE — TELEPHONE ENCOUNTER
S/w pt, explained xray results per JW's notation  Pt verbalized understanding  Pt states she had PT yesterday and noted increase in pain following session  Pt states as of this morning pain in lower back is the worst it's been, states she is having difficulty ambulating d/t pain  Pt asked if she must continue PT considering it is causing increased pain  Please advise, thank you

## 2023-06-16 NOTE — TELEPHONE ENCOUNTER
Caller: pt    Doctor: luis    Reason for call: pt is calling back  She wants to know if she should continue PT since it hurts so much?     Call back#: [phone]

## 2023-06-21 ENCOUNTER — HOSPITAL ENCOUNTER (OUTPATIENT)
Dept: INFUSION CENTER | Facility: HOSPITAL | Age: 63
Discharge: HOME | End: 2023-06-21
Attending: INTERNAL MEDICINE
Payer: MEDICARE

## 2023-06-21 VITALS
SYSTOLIC BLOOD PRESSURE: 138 MMHG | DIASTOLIC BLOOD PRESSURE: 74 MMHG | HEART RATE: 92 BPM | RESPIRATION RATE: 18 BRPM | TEMPERATURE: 97.8 F

## 2023-06-21 DIAGNOSIS — D50.0 IRON DEFICIENCY ANEMIA DUE TO CHRONIC BLOOD LOSS: Primary | ICD-10-CM

## 2023-06-21 DIAGNOSIS — D50.0 IRON DEFICIENCY ANEMIA DUE TO CHRONIC BLOOD LOSS: ICD-10-CM

## 2023-06-21 PROCEDURE — 96366 THER/PROPH/DIAG IV INF ADDON: CPT

## 2023-06-21 PROCEDURE — 96365 THER/PROPH/DIAG IV INF INIT: CPT

## 2023-06-21 RX ORDER — SODIUM CHLORIDE 9 MG/ML
20 INJECTION, SOLUTION INTRAVENOUS ONCE
Status: CANCELLED | OUTPATIENT
Start: 2023-06-29

## 2023-06-21 RX ORDER — SODIUM CHLORIDE 9 MG/ML
20 INJECTION, SOLUTION INTRAVENOUS ONCE
Status: COMPLETED | OUTPATIENT
Start: 2023-06-21 | End: 2023-06-21

## 2023-06-21 RX ADMIN — IRON SUCROSE 300 MG: 20 INJECTION, SOLUTION INTRAVENOUS at 11:39

## 2023-06-21 RX ADMIN — SODIUM CHLORIDE 20 ML/HR: 0.9 INJECTION, SOLUTION INTRAVENOUS at 11:38

## 2023-06-23 ENCOUNTER — OFFICE VISIT (OUTPATIENT)
Dept: PHYSICAL THERAPY | Facility: CLINIC | Age: 63
End: 2023-06-23
Payer: MEDICARE

## 2023-06-23 DIAGNOSIS — M48.061 SPINAL STENOSIS OF LUMBAR REGION, UNSPECIFIED WHETHER NEUROGENIC CLAUDICATION PRESENT: ICD-10-CM

## 2023-06-23 DIAGNOSIS — G89.29 CHRONIC BILATERAL LOW BACK PAIN WITHOUT SCIATICA: ICD-10-CM

## 2023-06-23 DIAGNOSIS — M54.50 CHRONIC BILATERAL LOW BACK PAIN WITHOUT SCIATICA: ICD-10-CM

## 2023-06-23 DIAGNOSIS — M47.816 LUMBAR SPONDYLOSIS: Primary | ICD-10-CM

## 2023-06-23 DIAGNOSIS — M43.16 SPONDYLOLISTHESIS AT L4-L5 LEVEL: ICD-10-CM

## 2023-06-23 PROCEDURE — 97112 NEUROMUSCULAR REEDUCATION: CPT

## 2023-06-23 PROCEDURE — 97110 THERAPEUTIC EXERCISES: CPT

## 2023-06-23 NOTE — PROGRESS NOTES
"Daily Note     Today's date: 2023  Patient name: Geo Anna  : 1960  MRN: [id number]  Referring provider: Natasha Marroquin DO  Dx:   Encounter Diagnosis     ICD-10-CM    1  Lumbar spondylosis  M47 816       2  Spondylolisthesis at L4-L5 level  M43 16       3  Spinal stenosis of lumbar region, unspecified whether neurogenic claudication present  M48 061       4  Chronic bilateral low back pain without sciatica  M54 50     G89 29           Start Time: 830  Stop Time: 905  Total time in clinic (min): 35 minutes    Subjective: Patient reports exacerbation of symptoms following last therapy session  She reports having B/L leg aching which lasted a full day and \"9/10\" low back pain B/L  She reports not having any leg symptoms since the day after therapy  Patient reports continued \"9/10\" low back pain prior to therapy  Objective: See treatment diary below    Assessment: Adapted exercises secondary to high levels of pain  Esteban Archer was able to complete listed TE below with no increase in subjective pain  She notes slightly less pain following REIL which was performed last     Plan: Continue per plan of care        Precautions: Low hemoglobin with chronic fatigue, Cardiac history, Pulmonary history, Digestive history (use wedge pillow), history of cancer    Clinical diagnosis: extension directional preference, core stab  Manuals                                                                  Neuro Re-Ed             TrA contraction 2x10 5\"x20           TrA supine march 2x10 3x5           TrA + SLR  3x5           Modified deadbug             Pball deadbug             Pallof press             Glute sets  5\"x20                                                               Ther Ex             SUE 2x10 2x10           Seated thoracic ext                                                                                           Ther Activity                                       Gait Training     " Modalities

## 2023-06-25 ENCOUNTER — HOSPITAL ENCOUNTER (EMERGENCY)
Facility: HOSPITAL | Age: 63
Discharge: HOME | End: 2023-06-25
Attending: EMERGENCY MEDICINE
Payer: MEDICARE

## 2023-06-25 ENCOUNTER — APPOINTMENT (EMERGENCY)
Dept: RADIOLOGY | Facility: HOSPITAL | Age: 63
End: 2023-06-25
Payer: MEDICARE

## 2023-06-25 VITALS
RESPIRATION RATE: 20 BRPM | SYSTOLIC BLOOD PRESSURE: 114 MMHG | HEART RATE: 84 BPM | DIASTOLIC BLOOD PRESSURE: 67 MMHG | TEMPERATURE: 98.4 F | OXYGEN SATURATION: 94 %

## 2023-06-25 DIAGNOSIS — R42 DIZZINESS: Primary | ICD-10-CM

## 2023-06-25 DIAGNOSIS — D64.9 ANEMIA: ICD-10-CM

## 2023-06-25 LAB
ALBUMIN SERPL BCP-MCNC: 3.6 G/DL (ref 3.5–5)
ALP SERPL-CCNC: 91 U/L (ref 46–116)
ALT SERPL W P-5'-P-CCNC: 23 U/L (ref 12–78)
ANION GAP SERPL CALCULATED.3IONS-SCNC: 3 MMOL/L
ANISOCYTOSIS BLD QL SMEAR: PRESENT
AST SERPL W P-5'-P-CCNC: 17 U/L (ref 5–45)
ATRIAL RATE: 83 BPM
BASOPHILS # BLD AUTO: 0.04 THOUSANDS/ÂΜL (ref 0–0.1)
BASOPHILS NFR BLD AUTO: 1 % (ref 0–1)
BILIRUB SERPL-MCNC: 0.14 MG/DL (ref 0.2–1)
BUN SERPL-MCNC: 7 MG/DL (ref 5–25)
CALCIUM SERPL-MCNC: 10.1 MG/DL (ref 8.3–10.1)
CARDIAC TROPONIN I PNL SERPL HS: <2 NG/L
CHLORIDE SERPL-SCNC: 101 MMOL/L (ref 96–108)
CO2 SERPL-SCNC: 28 MMOL/L (ref 21–32)
CREAT SERPL-MCNC: 0.64 MG/DL (ref 0.6–1.3)
EOSINOPHIL # BLD AUTO: 0.25 THOUSAND/ÂΜL (ref 0–0.61)
EOSINOPHIL NFR BLD AUTO: 7 % (ref 0–6)
ERYTHROCYTE [DISTWIDTH] IN BLOOD BY AUTOMATED COUNT: 26.3 % (ref 11.6–15.1)
GFR SERPL CREATININE-BSD FRML MDRD: 95 ML/MIN/1.73SQ M
GLUCOSE SERPL-MCNC: 98 MG/DL (ref 65–140)
HCT VFR BLD AUTO: 37.1 % (ref 34.8–46.1)
HGB BLD-MCNC: 11.9 G/DL (ref 11.5–15.4)
IMM GRANULOCYTES # BLD AUTO: 0.02 THOUSAND/UL (ref 0–0.2)
IMM GRANULOCYTES NFR BLD AUTO: 1 % (ref 0–2)
LYMPHOCYTES # BLD AUTO: 0.59 THOUSANDS/ÂΜL (ref 0.6–4.47)
LYMPHOCYTES NFR BLD AUTO: 17 % (ref 14–44)
MCH RBC QN AUTO: 24.2 PG (ref 26.8–34.3)
MCHC RBC AUTO-ENTMCNC: 32.1 G/DL (ref 31.4–37.4)
MCV RBC AUTO: 76 FL (ref 82–98)
MONOCYTES # BLD AUTO: 0.29 THOUSAND/ÂΜL (ref 0.17–1.22)
MONOCYTES NFR BLD AUTO: 8 % (ref 4–12)
NEUTROPHILS # BLD AUTO: 2.32 THOUSANDS/ÂΜL (ref 1.85–7.62)
NEUTS SEG NFR BLD AUTO: 66 % (ref 43–75)
NRBC BLD AUTO-RTO: 0 /100 WBCS
OVALOCYTES BLD QL SMEAR: PRESENT
P AXIS: 65 DEGREES
PLATELET # BLD AUTO: 273 THOUSANDS/UL (ref 149–390)
PLATELET BLD QL SMEAR: ADEQUATE
PMV BLD AUTO: 8.3 FL (ref 8.9–12.7)
POTASSIUM SERPL-SCNC: 3.7 MMOL/L (ref 3.5–5.3)
PR INTERVAL: 132 MS
PROT SERPL-MCNC: 7.4 G/DL (ref 6.4–8.4)
QRS AXIS: 10 DEGREES
QRSD INTERVAL: 78 MS
QT INTERVAL: 360 MS
QTC INTERVAL: 423 MS
RBC # BLD AUTO: 4.91 MILLION/UL (ref 3.81–5.12)
RBC MORPH BLD: PRESENT
SODIUM SERPL-SCNC: 132 MMOL/L (ref 135–147)
T WAVE AXIS: 65 DEGREES
VENTRICULAR RATE: 83 BPM
WBC # BLD AUTO: 3.51 THOUSAND/UL (ref 4.31–10.16)

## 2023-06-25 PROCEDURE — 96365 THER/PROPH/DIAG IV INF INIT: CPT

## 2023-06-25 PROCEDURE — 84484 ASSAY OF TROPONIN QUANT: CPT

## 2023-06-25 PROCEDURE — 93010 ELECTROCARDIOGRAM REPORT: CPT | Performed by: INTERNAL MEDICINE

## 2023-06-25 PROCEDURE — 36415 COLL VENOUS BLD VENIPUNCTURE: CPT

## 2023-06-25 PROCEDURE — 99285 EMERGENCY DEPT VISIT HI MDM: CPT

## 2023-06-25 PROCEDURE — 80053 COMPREHEN METABOLIC PANEL: CPT

## 2023-06-25 PROCEDURE — 71046 X-RAY EXAM CHEST 2 VIEWS: CPT

## 2023-06-25 PROCEDURE — 85025 COMPLETE CBC W/AUTO DIFF WBC: CPT

## 2023-06-25 PROCEDURE — 96366 THER/PROPH/DIAG IV INF ADDON: CPT

## 2023-06-25 PROCEDURE — 93005 ELECTROCARDIOGRAM TRACING: CPT

## 2023-06-25 RX ORDER — SODIUM CHLORIDE, SODIUM GLUCONATE, SODIUM ACETATE, POTASSIUM CHLORIDE, MAGNESIUM CHLORIDE, SODIUM PHOSPHATE, DIBASIC, AND POTASSIUM PHOSPHATE .53; .5; .37; .037; .03; .012; .00082 G/100ML; G/100ML; G/100ML; G/100ML; G/100ML; G/100ML; G/100ML
1000 INJECTION, SOLUTION INTRAVENOUS ONCE
Status: COMPLETED | OUTPATIENT
Start: 2023-06-25 | End: 2023-06-25

## 2023-06-25 RX ADMIN — SODIUM CHLORIDE, SODIUM GLUCONATE, SODIUM ACETATE, POTASSIUM CHLORIDE, MAGNESIUM CHLORIDE, SODIUM PHOSPHATE, DIBASIC, AND POTASSIUM PHOSPHATE 1000 ML: .53; .5; .37; .037; .03; .012; .00082 INJECTION, SOLUTION INTRAVENOUS at 10:22

## 2023-06-25 NOTE — ED PROVIDER NOTES
History  Chief Complaint   Patient presents with   • Dizziness     Arrived via EMS  C/o dizziness with SoB on exertion for last month worsening last 3 days  No chest pain, no LOC  PMH of anemia and blood transfusions  58-year-old female past medical history of schizoaffective disorder, anxiety, hypothyroidism, Schreiber's esophagus presents to the ED complaining of 3 days of fatigue and dizziness upon standing  Patient states that she was recently seen by her hematologist and found to have iron deficiency anemia  Patient tells me that she underwent her first iron transfusion about a week ago and is due for the second 1 next week however she feels like this is not aggressive enough treatment for her anemia and so presents requesting a blood transfusion  Patient tells me that in the past she has been so anemic that she required blood transfusions and she feels as though she needs a transfusion today  Patient tells me she has been more tired than usual over the past 3 days, she feels dizzy as though she is going to fall over when she stands up or moves her head abruptly  She tells me when laying flat or sitting without moving, she feels asymptomatic  No headaches no blurred vision she has been eating and drinking well  Not feel that she is dehydrated  She has no nausea or vomiting no dysuria hematuria no diarrhea or constipation no recent changes in medication  Prior to Admission Medications   Prescriptions Last Dose Informant Patient Reported? Taking? LORazepam (ATIVAN) 2 mg tablet   No No   Sig: Take 1 tablet (2 mg total) by mouth every 6 (six) hours as needed for anxiety   PARoxetine (PAXIL) 30 mg tablet   No No   Sig: Take 2 tablets (60 mg total) by mouth in the morning   QUEtiapine (SEROquel XR) 400 mg 24 hr tablet   No No   Sig: Take 1 tablet (400 mg total) by mouth daily at bedtime   amLODIPine (NORVASC) 5 mg tablet  Self No No   Sig: TAKE 1 TABLET (5 MG TOTAL) BY MOUTH DAILY     ferrous sulfate 325 (65 Fe) mg tablet   No No   Sig: Take 1 tablet (325 mg total) by mouth 2 (two) times a day with meals   folic acid (FOLVITE) 866 mcg tablet   No No   Sig: Take 1 tablet (400 mcg total) by mouth daily   levothyroxine 25 mcg tablet   No No   Sig: TAKE 1 TABLET BY MOUTH EVERY DAY   ondansetron (ZOFRAN) 4 mg tablet   No No   Sig: Take 1 tablet (4 mg total) by mouth every 8 (eight) hours as needed for nausea or vomiting   pantoprazole (PROTONIX) 40 mg tablet  Self No No   Sig: Take 1 tablet (40 mg total) by mouth 2 (two) times a day   sucralfate (CARAFATE) 1 g tablet   No No   Sig: TAKE 1 TABLET (1 G TOTAL) BY MOUTH 3 (THREE) TIMES A DAY WITH MEALS   triamcinolone (KENALOG) 0 1 % cream   No No   Sig: APPLY TOPICALLY 2 TIMES A DAY AS NEEDED FOR RASH     vitamin B-12 (VITAMIN B-12) 1,000 mcg tablet   No No   Sig: Take 1 tablet (1,000 mcg total) by mouth daily   ziprasidone (GEODON) 80 mg capsule   No No   Sig: TAKE 1 CAPSULE BY MOUTH EVERY DAY      Facility-Administered Medications: None       Past Medical History:   Diagnosis Date   • Anemia    • Anxiety    • Arthritis    • Back pain at L4-L5 level    • Schreiber esophagus    • Bulimia    • Colon polyp    • Disease of thyroid gland     hypothyroid   • GERD (gastroesophageal reflux disease)    • Hearing loss in left ear    • History of transfusion    • Hyperlipidemia    • Hypertension    • Hypothyroidism    • Leukopenia    • NMS (neuroleptic malignant syndrome) 01/03/2020   • Pneumonia    • RA (rheumatoid arthritis) ([facility] [address] )     in feet   • Sciatica    • Seizure ([facility] [address] )     due to medication mix up 8/2018 only one historically   • Skin spots, red     lower right arm - poss from cat- no s/s infection   • Synovial cyst of lumbar spine    • Vertigo    • Wears dentures     full set   • Weight gain        Past Surgical History:   Procedure Laterality Date   • BONE MARROW BIOPSY     • BREAST SURGERY      enlargement with implants   • CLOSED REDUCTION DISTAL FEMUR FRACTURE • COLONOSCOPY     • COSMETIC SURGERY     • DENTAL SURGERY     • HIP ARTHROPLASTY Right 01/02/2020    Procedure: REVISION TOTAL HIP ARTHROPLASTY WITH REPAIR OF PARIPROSTHETIC FRACTURE - POSTERIOR APPROACH;  Surgeon: Jaden Voss MD;  Location: [facility] MA[facility];  Service: Orthopedics   • LA AMPUTATION METATARSAL W/TOE SINGLE Left 04/07/2023    Procedure: AMPUTATION TOE 4th;  Surgeon: Gurwinder Dunn DPM;  Location: [address] [place] [address] MAIN OR;  Service: Podiatry   • LA ARTHRP ACETBLR/PROX FEM PROSTC AGRFT/ALGRFT Right 12/11/2019    Procedure: ARTHROPLASTY HIP TOTAL ANTERIOR;  Surgeon: Jaden Voss MD;  Location: [facility];  Service: Orthopedics   • LA ESOPHAGOGASTRODUODENOSCOPY TRANSORAL DIAGNOSTIC N/A 05/11/2021    Procedure: ESOPHAGOGASTRODUODENOSCOPY (EGD); Surgeon: Main Murray MD;  Location: [facility];  Service: General   • LA LAPS RPR PARAESPHGL HRNA INCL FUNDPLSTY [address] N/A 05/11/2021    Procedure: REPAIR HERNIA PARAESOPHAGEAL  LAPAROSCOPIC;  Surgeon: Main Murray MD;  Location: [facility];  Service: General   • LA UNLISTED PROCEDURE ESOPHAGUS N/A 05/11/2021    Procedure: FUNDOPLICATION TRANSORAL INCISIONLESS;  Surgeon: Lexis Strickland MD;  Location: [facility];  Service: Gastroenterology   • RHINOPLASTY     • SINUS SURGERY     • TOE AMPUTATION Left     2023 4th toe   • TRANSORAL INCISIONLESS FUNDOPLICATION (TIF)  19/89/4733       Family History   Problem Relation Age of Onset   • Uterine cancer Mother    • Esophageal cancer Father    • Colon cancer Maternal Grandmother      I have reviewed and agree with the history as documented      E-Cigarette/Vaping   • E-Cigarette Use Never User      E-Cigarette/Vaping Substances   • Nicotine No    • THC No    • CBD No    • Flavoring No    • Other No    • Unknown No      Social History     Tobacco Use   • Smoking status: Never   • Smokeless tobacco: Never   Vaping Use   • Vaping Use: Never used   Substance Use Topics   • Alcohol use: Not Currently   • Drug use: Not Currently Review of Systems   Constitutional: Positive for fatigue  Negative for chills and fever  HENT: Negative for ear pain and sore throat  Eyes: Negative for pain and visual disturbance  Respiratory: Negative for cough and shortness of breath  Cardiovascular: Negative for chest pain and palpitations  Gastrointestinal: Negative for abdominal pain, diarrhea, nausea and vomiting  Genitourinary: Negative for dysuria and hematuria  Musculoskeletal: Negative for arthralgias and back pain  Skin: Negative for color change and rash  Neurological: Positive for light-headedness  Negative for seizures and syncope  All other systems reviewed and are negative  Physical Exam  ED Triage Vitals   Temperature Pulse Respirations Blood Pressure SpO2   06/25/23 1018 06/25/23 1006 06/25/23 1006 06/25/23 1006 06/25/23 1006   98 4 °F (36 9 °C) 84 20 114/67 94 %      Temp Source Heart Rate Source Patient Position - Orthostatic VS BP Location FiO2 (%)   06/25/23 1018 -- 06/25/23 1006 06/25/23 1006 --   Oral  Sitting Right arm       Pain Score       --                    Orthostatic Vital Signs  Vitals:    06/25/23 1006   BP: 114/67   Pulse: 84   Patient Position - Orthostatic VS: Sitting       Physical Exam  Vitals and nursing note reviewed  Constitutional:       General: She is not in acute distress  Appearance: She is well-developed  She is not ill-appearing  HENT:      Head: Normocephalic and atraumatic  Eyes:      Conjunctiva/sclera: Conjunctivae normal    Cardiovascular:      Rate and Rhythm: Normal rate and regular rhythm  Heart sounds: No murmur heard  Pulmonary:      Effort: Pulmonary effort is normal  No respiratory distress  Breath sounds: Normal breath sounds  Abdominal:      Palpations: Abdomen is soft  Tenderness: There is no abdominal tenderness  Musculoskeletal:         General: No swelling  Cervical back: Neck supple  Skin:     General: Skin is warm and dry  Capillary Refill: Capillary refill takes less than 2 seconds  Neurological:      General: No focal deficit present  Mental Status: She is alert and oriented to person, place, and time  Cranial Nerves: No cranial nerve deficit  Sensory: No sensory deficit  Motor: No weakness  Coordination: Coordination normal    Psychiatric:         Mood and Affect: Mood normal          ED Medications  Medications   multi-electrolyte (ISOLYTE-S PH 7 4) bolus 1,000 mL (0 mL Intravenous Stopped 6/25/23 1212)       Diagnostic Studies  Results Reviewed     Procedure Component Value Units Date/Time    HS Troponin 0hr (reflex protocol) [[id number]]  (Normal) Collected: 06/25/23 1022    Lab Status: Final result Specimen: Blood from Arm, Right Updated: 06/25/23 1114     hs TnI 0hr <2 ng/L     CBC and differential [[id number]]  (Abnormal) Collected: 06/25/23 1022    Lab Status: Final result Specimen: Blood from Arm, Right Updated: 06/25/23 1112     WBC 3 51 Thousand/uL      RBC 4 91 Million/uL      Hemoglobin 11 9 g/dL      Hematocrit 37 1 %      MCV 76 fL      MCH 24 2 pg      MCHC 32 1 g/dL      RDW 26 3 %      MPV 8 3 fL      Platelets 772 Thousands/uL      nRBC 0 /100 WBCs      Neutrophils Relative 66 %      Immat GRANS % 1 %      Lymphocytes Relative 17 %      Monocytes Relative 8 %      Eosinophils Relative 7 %      Basophils Relative 1 %      Neutrophils Absolute 2 32 Thousands/µL      Immature Grans Absolute 0 02 Thousand/uL      Lymphocytes Absolute 0 59 Thousands/µL      Monocytes Absolute 0 29 Thousand/µL      Eosinophils Absolute 0 25 Thousand/µL      Basophils Absolute 0 04 Thousands/µL     Narrative: This is an appended report  These results have been appended to a previously verified report      Smear Review(Phlebs Do Not Order) [[id number]] Collected: 06/25/23 1022    Lab Status: Final result Specimen: Blood from Arm, Right Updated: 06/25/23 1112     RBC Morphology Present     Platelet Estimate Adequate     Anisocytosis Present     Ovalocytes Present    Comprehensive metabolic panel [[id number]]  (Abnormal) Collected: 06/25/23 1022    Lab Status: Final result Specimen: Blood from Arm, Right Updated: 06/25/23 1107     Sodium 132 mmol/L      Potassium 3 7 mmol/L      Chloride 101 mmol/L      CO2 28 mmol/L      ANION GAP 3 mmol/L      BUN 7 mg/dL      Creatinine 0 64 mg/dL      Glucose 98 mg/dL      Calcium 10 1 mg/dL      AST 17 U/L      ALT 23 U/L      Alkaline Phosphatase 91 U/L      Total Protein 7 4 g/dL      Albumin 3 6 g/dL      Total Bilirubin 0 14 mg/dL      eGFR 95 ml/min/1 73sq m     Narrative:      [place] guidelines for Chronic Kidney Disease (CKD):   •  Stage 1 with normal or high GFR (GFR > 90 mL/min/1 73 square meters)  •  Stage 2 Mild CKD (GFR = 60-89 mL/min/1 73 square meters)  •  Stage 3A Moderate CKD (GFR = 45-59 mL/min/1 73 square meters)  •  Stage 3B Moderate CKD (GFR = 30-44 mL/min/1 73 square meters)  •  Stage 4 Severe CKD (GFR = 15-29 mL/min/1 73 square meters)  •  Stage 5 End Stage CKD (GFR <15 mL/min/1 73 square meters)  Note: GFR calculation is accurate only with a steady state creatinine                 XR chest 2 views   Final Result by Jolynn Angel MD (06/25 1314)      No acute cardiopulmonary disease  Workstation performed: [id number]               Procedures  Procedures      ED Course                                       Medical Decision Making  79-year-old female past medical history of schizoaffective disorder, anxiety, hypothyroidism presents to the ED complaining of 3 days of fatigue and dizziness upon standing  DDx: Microcytic anemia secondary to chronic blood loss/erosive esophagitis, ACS, CHF, electrolyte abnormality, pneumonia  Patient has entirely normal neuro exam   I have patient up and walk and had normal gait without any dizziness or vertigo    We will obtain basic labs, evaluate for anemia and plan to treat patient's symptoms with a liter of IV fluid  Labs largely unremarkable, CBC concerning for macrocytosis however, hemoglobin, no occasion for infusion at this time  Discussed normal findings with patient, she feels reassured  Feels symptomatically improved following liter of IV fluids  Discussed need to continue with iron infusion as scheduled and follow-up with heart hematologist as scheduled  Patient understanding, discharged home in stable condition to follow-up with EP and hematology  Ambulated off ED floor on her own without any difficulty  Amount and/or Complexity of Data Reviewed  Labs: ordered  Radiology: ordered  Risk  Prescription drug management  Disposition  Final diagnoses:   Dizziness   Anemia     Time reflects when diagnosis was documented in both MDM as applicable and the Disposition within this note     Time User Action Codes Description Comment    6/25/2023 12:01 PM Rosette Ortiz Add [R42] Dizziness     6/25/2023 12:01 PM Rosette Ortiz Add [D64 9] Anemia       ED Disposition     ED Disposition   Discharge    Condition   Stable    Date/Time   Sun Jun 25, 2023 12:01 PM    Comment   [facility] discharge to home/self care  Follow-up Information     Follow up With Specialties Details Why Contact Info    Emily Mckeon MD Internal Medicine Schedule an appointment as soon as possible for a visit   [address]  [phone]            Discharge Medication List as of 6/25/2023 12:01 PM      CONTINUE these medications which have NOT CHANGED    Details   amLODIPine (NORVASC) 5 mg tablet TAKE 1 TABLET (5 MG TOTAL) BY MOUTH DAILY  , Starting Tue 1/24/2023, Normal      ferrous sulfate 325 (65 Fe) mg tablet Take 1 tablet (325 mg total) by mouth 2 (two) times a day with meals, Starting Sat 5/25/3228, Normal      folic acid (FOLVITE) 506 mcg tablet Take 1 tablet (400 mcg total) by mouth daily, Starting Fri 6/2/2023, Normal      levothyroxine 25 mcg tablet TAKE 1 TABLET BY MOUTH EVERY DAY, Normal      LORazepam (ATIVAN) 2 mg tablet Take 1 tablet (2 mg total) by mouth every 6 (six) hours as needed for anxiety, Starting Thu 4/6/2023, Normal      ondansetron (ZOFRAN) 4 mg tablet Take 1 tablet (4 mg total) by mouth every 8 (eight) hours as needed for nausea or vomiting, Starting Fri 3/24/2023, Normal      pantoprazole (PROTONIX) 40 mg tablet Take 1 tablet (40 mg total) by mouth 2 (two) times a day, Starting Tue 1/24/2023, Normal      PARoxetine (PAXIL) 30 mg tablet Take 2 tablets (60 mg total) by mouth in the morning, Starting Mon 5/22/2023, Normal      QUEtiapine (SEROquel XR) 400 mg 24 hr tablet Take 1 tablet (400 mg total) by mouth daily at bedtime, Starting Fri 5/26/2023, Normal      sucralfate (CARAFATE) 1 g tablet TAKE 1 TABLET (1 G TOTAL) BY MOUTH 3 (THREE) TIMES A DAY WITH MEALS, Starting Sat 3/25/2023, Normal      triamcinolone (KENALOG) 0 1 % cream APPLY TOPICALLY 2 TIMES A DAY AS NEEDED FOR RASH , Normal      vitamin B-12 (VITAMIN B-12) 1,000 mcg tablet Take 1 tablet (1,000 mcg total) by mouth daily, Starting Fri 6/2/2023, Normal      ziprasidone (GEODON) 80 mg capsule TAKE 1 CAPSULE BY MOUTH EVERY DAY, Normal           No discharge procedures on file  PDMP Review       Value Time User    PDMP Reviewed  Yes 4/6/2023 11:34 AM Joellen Carrel, MD           ED Provider  Attending physically available and evaluated [place] I managed the patient along with the ED Attending      Electronically Signed by         Brendon Vasquez MD  06/25/23 1640

## 2023-06-25 NOTE — ED ATTENDING ATTESTATION
6/25/2023  IHolly MD, saw and evaluated the patient  I have discussed the patient with the resident/non-physician practitioner and agree with the resident's/non-physician practitioner's findings, Plan of Care, and MDM as documented in the resident's/non-physician practitioner's note, except where noted  All available labs and Radiology studies were reviewed  I was present for key portions of any procedure(s) performed by the resident/non-physician practitioner and I was immediately available to provide assistance  At this point I agree with the current assessment done in the Emergency Department    I have conducted an independent evaluation of this patient a history and physical is as follows:  Pt has history of iron deficiency anemia and has just completed iron infusions Pt felt lightheaded today and is concerned her hgb is low Pt denies cp no sob Pt denies bloody stool PE: alert heart reg lungs clear abd soft nontender MDM: will check labs   ED Course         Critical Care Time  Procedures

## 2023-06-26 DIAGNOSIS — Z71.89 COMPLEX CARE COORDINATION: Primary | ICD-10-CM

## 2023-06-27 ENCOUNTER — OFFICE VISIT (OUTPATIENT)
Dept: INTERNAL MEDICINE CLINIC | Facility: CLINIC | Age: 63
End: 2023-06-27
Payer: MEDICARE

## 2023-06-27 ENCOUNTER — PATIENT OUTREACH (OUTPATIENT)
Dept: INTERNAL MEDICINE CLINIC | Facility: CLINIC | Age: 63
End: 2023-06-27

## 2023-06-27 VITALS
SYSTOLIC BLOOD PRESSURE: 126 MMHG | BODY MASS INDEX: 33.82 KG/M2 | WEIGHT: 183.8 LBS | HEIGHT: 62 IN | HEART RATE: 90 BPM | OXYGEN SATURATION: 99 % | DIASTOLIC BLOOD PRESSURE: 82 MMHG

## 2023-06-27 DIAGNOSIS — Z12.31 ENCOUNTER FOR SCREENING MAMMOGRAM FOR BREAST CANCER: ICD-10-CM

## 2023-06-27 DIAGNOSIS — F25.1 SCHIZOAFFECTIVE DISORDER, DEPRESSIVE TYPE (HCC): ICD-10-CM

## 2023-06-27 DIAGNOSIS — E03.9 ACQUIRED HYPOTHYROIDISM: ICD-10-CM

## 2023-06-27 DIAGNOSIS — Z12.4 SCREENING FOR CERVICAL CANCER: ICD-10-CM

## 2023-06-27 DIAGNOSIS — D50.9 MICROCYTIC ANEMIA: ICD-10-CM

## 2023-06-27 DIAGNOSIS — M48.062 SPINAL STENOSIS OF LUMBAR REGION WITH NEUROGENIC CLAUDICATION: Primary | ICD-10-CM

## 2023-06-27 PROBLEM — J20.8 VIRAL BRONCHITIS: Status: RESOLVED | Noted: 2023-04-28 | Resolved: 2023-06-27

## 2023-06-27 PROCEDURE — 99214 OFFICE O/P EST MOD 30 MIN: CPT | Performed by: INTERNAL MEDICINE

## 2023-06-27 NOTE — PROGRESS NOTES
Name: Paul Menjivar      : 1960      MRN: [id number]  Encounter Provider: Mamadou Thorpe MD  Encounter Date: 2023   Encounter department: [facility]     1  Spinal stenosis of lumbar region with neurogenic claudication  Assessment & Plan:  Follow up pain management      2  Screening for cervical cancer    3  Encounter for screening mammogram for breast cancer  -     Mammo screening bilateral w 3d & cad; Future; Expected date: 2023    4  Microcytic anemia  Assessment & Plan:  Pt getting iron infusions with heme onc, and she follows with GI for possible GI bleed issues  Patient denies any recent GI bleeding or black tarry stool  Recent hemoglobin 11 9      5  Acquired hypothyroidism  Assessment & Plan:  Continue levothyroxine along with monitoring  Thyroid function tests normal in   Schizoaffective disorder, depressive type [facility])  Assessment & Plan:  Meds, patient has emotional support from her cat  Subjective     Pt here for follow up  Her mother is currently at [facility]  Review of Systems   Constitutional: Positive for fatigue  Negative for chills and fever  HENT: Negative for congestion, nosebleeds, postnasal drip, sore throat and trouble swallowing  Eyes: Negative for pain  Respiratory: Negative for cough, chest tightness, shortness of breath and wheezing  Cardiovascular: Negative for chest pain, palpitations and leg swelling  Gastrointestinal: Negative for abdominal pain, constipation, diarrhea, nausea and vomiting  Endocrine: Negative for polydipsia and polyuria  Genitourinary: Negative for dysuria, flank pain and hematuria  Musculoskeletal: Positive for back pain  Negative for arthralgias  Skin: Negative for rash  Neurological: Positive for dizziness (occasional)  Negative for tremors, light-headedness and headaches  Hematological: Does not bruise/bleed easily  Psychiatric/Behavioral: Positive for dysphoric mood  Negative for confusion  The patient is nervous/anxious  Past Medical History:   Diagnosis Date   • Anemia    • Anxiety    • Arthritis    • Back pain at L4-L5 level    • Schreiber esophagus    • Bulimia    • Colon polyp    • Disease of thyroid gland     hypothyroid   • GERD (gastroesophageal reflux disease)    • Hearing loss in left ear    • History of transfusion    • Hyperlipidemia    • Hypertension    • Hypothyroidism    • Leukopenia    • NMS (neuroleptic malignant syndrome) 01/03/2020   • Pneumonia    • RA (rheumatoid arthritis) ([facility] [address] )     in feet   • Sciatica    • Seizure ([facility] [address] )     due to medication mix up 8/2018 only one historically   • Skin spots, red     lower right arm - poss from cat- no s/s infection   • Synovial cyst of lumbar spine    • Vertigo    • Wears dentures     full set   • Weight gain      Past Surgical History:   Procedure Laterality Date   • BONE MARROW BIOPSY     • BREAST SURGERY      enlargement with implants   • CLOSED REDUCTION DISTAL FEMUR FRACTURE     • COLONOSCOPY     • COSMETIC SURGERY     • DENTAL SURGERY     • HIP ARTHROPLASTY Right 01/02/2020    Procedure: REVISION TOTAL HIP ARTHROPLASTY WITH REPAIR OF PARIPROSTHETIC FRACTURE - POSTERIOR APPROACH;  Surgeon: Asiya Warner MD;  [facility] MAIN OR;  Service: Orthopedics   • NV AMPUTATION METATARSAL W/TOE SINGLE Left 04/07/2023    Procedure: AMPUTATION TOE 4th;  Surgeon: Pilar Stewart DPM;  Location: [address] [place] [address] MAIN OR;  Service: Podiatry   • NV ARTHRP ACETBLR/PROX FEM PROSTC AGRFT/ALGRFT Right 12/11/2019    Procedure: ARTHROPLASTY HIP TOTAL ANTERIOR;  Surgeon: Asiya Warner MD;  Location: [facility] MAIN OR;  Service: Orthopedics   • NV ESOPHAGOGASTRODUODENOSCOPY TRANSORAL DIAGNOSTIC N/A 05/11/2021    Procedure: ESOPHAGOGASTRODUODENOSCOPY (EGD); Surgeon:  Robby Wytat MD;  Location: [facility] MAIN OR;  Service: General   • NV LAPS RPR PARAESPHGL HRNA INCL [facility] N/A 05/11/2021 Procedure: REPAIR HERNIA PARAESOPHAGEAL  LAPAROSCOPIC;  Surgeon: Ashlie Lara MD;  Location: [facility] MAIN OR;  Service: General   • AL UNLISTED PROCEDURE ESOPHAGUS N/A 05/11/2021    Procedure: FUNDOPLICATION TRANSORAL INCISIONLESS;  Surgeon: Chana Robbins MD;  Location: [facility] MAIN OR;  Service: Gastroenterology   • RHINOPLASTY     • SINUS SURGERY     • TOE AMPUTATION Left     2023 4th toe   • TRANSORAL INCISIONLESS FUNDOPLICATION (TIF)  62/59/8547     Family History   Problem Relation Age of Onset   • Uterine cancer Mother    • Esophageal cancer Father    • Colon cancer Maternal Grandmother      Social History     Socioeconomic History   • Marital status: Single     Spouse name: None   • Number of children: None   • Years of education: None   • Highest education level: None   Occupational History   • None   Tobacco Use   • Smoking status: Never   • Smokeless tobacco: Never   Vaping Use   • Vaping Use: Never used   Substance and Sexual Activity   • Alcohol use: Not Currently   • Drug use: Not Currently   • Sexual activity: Not Currently   Other Topics Concern   • None   Social History Narrative    Family disruption death of family member parent, per allscripts     Social Determinants of Health     Financial Resource Strain: Medium Risk (1/4/2023)    Overall Financial Resource Strain (CARDIA)    • Difficulty of Paying Living Expenses: Somewhat hard   Food Insecurity: No Food Insecurity (12/20/2022)    Hunger Vital Sign    • Worried About Running Out of Food in the Last Year: Never true    • Ran Out of Food in the Last Year: Never true   Transportation Needs: No Transportation Needs (1/4/2023)    PRAPARE - Transportation    • Lack of Transportation (Medical): No    • Lack of Transportation (Non-Medical): No   Physical Activity: Not on file   Stress: Stress Concern Present (5/18/2021)    [address]    • Feeling of Stress :  To some extent   Social Connections: Not on file   Intimate Partner Violence: Not on file   Housing Stability: Low Risk  (12/20/2022)    Housing Stability Vital Sign    • Unable to Pay for Housing in the Last Year: No    • Number of Places Lived in the Last Year: 1    • Unstable Housing in the Last Year: No     Current Outpatient Medications on File Prior to Visit   Medication Sig   • amLODIPine (NORVASC) 5 mg tablet TAKE 1 TABLET (5 MG TOTAL) BY MOUTH DAILY  • ferrous sulfate 325 (65 Fe) mg tablet Take 1 tablet (325 mg total) by mouth 2 (two) times a day with meals   • folic acid (FOLVITE) 680 mcg tablet Take 1 tablet (400 mcg total) by mouth daily   • levothyroxine 25 mcg tablet TAKE 1 TABLET BY MOUTH EVERY DAY   • LORazepam (ATIVAN) 2 mg tablet Take 1 tablet (2 mg total) by mouth every 6 (six) hours as needed for anxiety   • ondansetron (ZOFRAN) 4 mg tablet Take 1 tablet (4 mg total) by mouth every 8 (eight) hours as needed for nausea or vomiting   • pantoprazole (PROTONIX) 40 mg tablet Take 1 tablet (40 mg total) by mouth 2 (two) times a day   • PARoxetine (PAXIL) 30 mg tablet Take 2 tablets (60 mg total) by mouth in the morning   • QUEtiapine (SEROquel XR) 400 mg 24 hr tablet Take 1 tablet (400 mg total) by mouth daily at bedtime   • sucralfate (CARAFATE) 1 g tablet TAKE 1 TABLET (1 G TOTAL) BY MOUTH 3 (THREE) TIMES A DAY WITH MEALS   • triamcinolone (KENALOG) 0 1 % cream APPLY TOPICALLY 2 TIMES A DAY AS NEEDED FOR RASH     • vitamin B-12 (VITAMIN B-12) 1,000 mcg tablet Take 1 tablet (1,000 mcg total) by mouth daily   • ziprasidone (GEODON) 80 mg capsule TAKE 1 CAPSULE BY MOUTH EVERY DAY     Allergies   Allergen Reactions   • Risperdal [Risperidone] Shortness Of Breath     Rapid heart beat, SOB   • Zyprexa [Olanzapine] Shortness Of Breath     Rapid heartbeat   • Latex Rash     Band aids, adhesives wears normal underwear, can eat bananas  USE PAPER TAPE     Immunization History   Administered Date(s) Administered   • COVID-19 PFIZER VACCINE 0 3 ML "IM 03/20/2021, 04/10/2021, 10/09/2021   • H1N1, All Formulations 11/17/2009   • INFLUENZA 11/17/2009, 01/04/2013, 11/11/2014, 10/20/2015, 10/31/2016, 09/16/2018   • Influenza Injectable, MDCK, Preservative Free, Quadrivalent, 0 5 mL 09/26/2019   • Influenza Quadrivalent, 6-35 Months IM 10/31/2016   • Influenza, injectable, quadrivalent, preservative free 0 5 mL 09/05/2020   • Influenza, recombinant, quadrivalent,injectable, preservative free 11/04/2021   • Pneumococcal Conjugate 13-Valent 09/16/2018   • Rabies 07/29/2014, 08/01/2014, 08/05/2014   • Tdap 07/29/2014, 12/17/2022       Objective     /82 (BP Location: Left arm, Patient Position: Sitting, Cuff Size: Standard)   Pulse 90   Ht 5' 2\" (1 575 m)   Wt 83 4 kg (183 lb 12 8 oz)   SpO2 99%   BMI 33 62 kg/m²     Physical Exam  Vitals reviewed  Constitutional:       General: She is not in acute distress  Appearance: Normal appearance  She is well-developed  HENT:      Head: Normocephalic and atraumatic  Right Ear: External ear normal       Left Ear: External ear normal    Eyes:      General: No scleral icterus  Conjunctiva/sclera: Conjunctivae normal    Neck:      Thyroid: No thyromegaly  Trachea: No tracheal deviation  Cardiovascular:      Rate and Rhythm: Normal rate and regular rhythm  Heart sounds: Normal heart sounds  No murmur heard  Pulmonary:      Effort: Pulmonary effort is normal  No respiratory distress  Breath sounds: Normal breath sounds  No wheezing or rales  Musculoskeletal:      Cervical back: Normal range of motion and neck supple  Right lower leg: No edema  Left lower leg: No edema  Lymphadenopathy:      Cervical: No cervical adenopathy  Skin:     Coloration: Skin is not jaundiced or pale  Neurological:      Mental Status: She is alert and oriented to person, place, and time  Psychiatric:         Behavior: Behavior normal          Thought Content:  Thought content normal          " Judgment: Judgment normal        Ollie Schultz MD

## 2023-06-27 NOTE — PATIENT INSTRUCTIONS
Problem List Items Addressed This Visit          Endocrine    Acquired hypothyroidism     Continue levothyroxine along with monitoring  Thyroid function tests normal in January  Other    Spinal stenosis of lumbar region with neurogenic claudication - Primary     Follow up pain management         Schizoaffective disorder ([facility] [address] )     Meds, patient has emotional support from her cat  Microcytic anemia     Pt getting iron infusions with heme onc, and she follows with GI for possible GI bleed issues  Patient denies any recent GI bleeding or black tarry stool    Recent hemoglobin 11 9          Other Visit Diagnoses       Screening for cervical cancer        Encounter for screening mammogram for breast cancer        Relevant Orders    Mammo screening bilateral w 3d & cad

## 2023-06-27 NOTE — PROGRESS NOTES
Referral from  to outreach patient for emergency room visit   Patient seen today by PCP no reason for care management follow up

## 2023-06-27 NOTE — ASSESSMENT & PLAN NOTE
Pt getting iron infusions with heme onc, and she follows with GI for possible GI bleed issues  Patient denies any recent GI bleeding or black tarry stool    Recent hemoglobin 11 9

## 2023-06-29 ENCOUNTER — HOSPITAL ENCOUNTER (OUTPATIENT)
Dept: INFUSION CENTER | Facility: HOSPITAL | Age: 63
Discharge: HOME | End: 2023-06-29
Attending: INTERNAL MEDICINE
Payer: MEDICARE

## 2023-06-29 VITALS
SYSTOLIC BLOOD PRESSURE: 145 MMHG | TEMPERATURE: 97.3 F | HEART RATE: 97 BPM | RESPIRATION RATE: 18 BRPM | DIASTOLIC BLOOD PRESSURE: 84 MMHG

## 2023-06-29 DIAGNOSIS — D50.0 IRON DEFICIENCY ANEMIA DUE TO CHRONIC BLOOD LOSS: ICD-10-CM

## 2023-06-29 DIAGNOSIS — D50.0 IRON DEFICIENCY ANEMIA DUE TO CHRONIC BLOOD LOSS: Primary | ICD-10-CM

## 2023-06-29 PROCEDURE — 96366 THER/PROPH/DIAG IV INF ADDON: CPT

## 2023-06-29 PROCEDURE — 96365 THER/PROPH/DIAG IV INF INIT: CPT

## 2023-06-29 RX ORDER — SODIUM CHLORIDE 9 MG/ML
20 INJECTION, SOLUTION INTRAVENOUS ONCE
Status: COMPLETED | OUTPATIENT
Start: 2023-06-29 | End: 2023-06-29

## 2023-06-29 RX ORDER — SODIUM CHLORIDE 9 MG/ML
20 INJECTION, SOLUTION INTRAVENOUS ONCE
Status: CANCELLED | OUTPATIENT
Start: 2023-07-07

## 2023-06-29 RX ADMIN — SODIUM CHLORIDE 20 ML/HR: 0.9 INJECTION, SOLUTION INTRAVENOUS at 08:41

## 2023-06-29 RX ADMIN — IRON SUCROSE 300 MG: 20 INJECTION, SOLUTION INTRAVENOUS at 08:41

## 2023-06-30 ENCOUNTER — TELEPHONE (OUTPATIENT)
Dept: INTERNAL MEDICINE CLINIC | Facility: CLINIC | Age: 63
End: 2023-06-30

## 2023-06-30 ENCOUNTER — OFFICE VISIT (OUTPATIENT)
Dept: PHYSICAL THERAPY | Facility: CLINIC | Age: 63
End: 2023-06-30
Payer: MEDICARE

## 2023-06-30 DIAGNOSIS — M43.16 SPONDYLOLISTHESIS AT L4-L5 LEVEL: ICD-10-CM

## 2023-06-30 DIAGNOSIS — M47.816 LUMBAR SPONDYLOSIS: Primary | ICD-10-CM

## 2023-06-30 DIAGNOSIS — F41.9 ANXIETY: ICD-10-CM

## 2023-06-30 PROCEDURE — 97112 NEUROMUSCULAR REEDUCATION: CPT

## 2023-06-30 RX ORDER — LORAZEPAM 2 MG/1
2 TABLET ORAL EVERY 6 HOURS PRN
Qty: 120 TABLET | Refills: 1 | Status: SHIPPED | OUTPATIENT
Start: 2023-06-30

## 2023-06-30 NOTE — TELEPHONE ENCOUNTER
Pt called requesting if her Ativan can be refilled     If this can be sent to [facility] off of [address]

## 2023-06-30 NOTE — PROGRESS NOTES
"Daily Note     Today's date: 2023  Patient name: Mana Cota  : 1960  MRN: [id number]  Referring provider: José Antonio Knapp DO  Dx:   Encounter Diagnosis     ICD-10-CM    1  Lumbar spondylosis  M47 816       2  Spondylolisthesis at L4-L5 level  M43 16                      Subjective: Pt reports she had to go to the ED secondary to dizziness and anemia over the past week  She did receive a treatment infusion yesterday and got sick to her stomach during  She is still feeling a little nauseas and dizzy today  Pt reports she continues to have pain in her low back  Objective: See treatment diary below    Assessment: Pt tolerated session well  Removed SUE as patient had increased pain with that exercises that did not resolve with seated rest  Pt did not improvement in symptoms with core stabilization exercises  Pt reports not feeling overly fatigued following session  Plan: Continue per plan of care        Precautions: Low hemoglobin with chronic fatigue, Cardiac history, Pulmonary history, Digestive history (use wedge pillow), history of cancer    Clinical diagnosis: extension directional preference, core stab  Manuals                                                               Neuro Re-Ed             TrA contraction 2x10 5\"x20 5\"x20          TrA supine march 2x10 3x5 3x10          TrA + SLR  3x5 3x5          Modified deadbug             Pball deadbug             Pallof press             Glute sets  5\"x20 5\"x20                                                              Ther Ex             SUE 2x10 2x10 D/c          Seated thoracic ext                                                                                           Ther Activity                                       Gait Training                                       Modalities                                          "

## 2023-07-05 ENCOUNTER — HOSPITAL ENCOUNTER (OUTPATIENT)
Dept: RADIOLOGY | Facility: CLINIC | Age: 63
Discharge: HOME | End: 2023-07-05
Payer: MEDICARE

## 2023-07-05 VITALS
SYSTOLIC BLOOD PRESSURE: 137 MMHG | DIASTOLIC BLOOD PRESSURE: 85 MMHG | RESPIRATION RATE: 18 BRPM | TEMPERATURE: 98.2 F | HEART RATE: 85 BPM | OXYGEN SATURATION: 96 %

## 2023-07-05 DIAGNOSIS — M47.816 LUMBAR SPONDYLOSIS: ICD-10-CM

## 2023-07-05 DIAGNOSIS — M54.50 LUMBAR PAIN: ICD-10-CM

## 2023-07-05 PROCEDURE — 64493 INJ PARAVERT F JNT L/S 1 LEV: CPT | Performed by: ANESTHESIOLOGY

## 2023-07-05 PROCEDURE — 64494 INJ PARAVERT F JNT L/S 2 LEV: CPT | Performed by: ANESTHESIOLOGY

## 2023-07-05 RX ORDER — LIDOCAINE HYDROCHLORIDE 10 MG/ML
10 INJECTION, SOLUTION EPIDURAL; INFILTRATION; INTRACAUDAL; PERINEURAL ONCE
Status: COMPLETED | OUTPATIENT
Start: 2023-07-05 | End: 2023-07-05

## 2023-07-05 RX ADMIN — LIDOCAINE HYDROCHLORIDE 3 ML: 20 INJECTION, SOLUTION EPIDURAL; INFILTRATION; INTRACAUDAL; PERINEURAL at 09:39

## 2023-07-05 RX ADMIN — LIDOCAINE HYDROCHLORIDE 4 ML: 10 INJECTION, SOLUTION EPIDURAL; INFILTRATION; INTRACAUDAL; PERINEURAL at 09:39

## 2023-07-05 NOTE — DISCHARGE INSTRUCTIONS

## 2023-07-05 NOTE — H&P
History of Present Illness: The patient is a 58 y.o. female who presents with complaints of low back pain. Past Medical History:   Diagnosis Date   • Anemia    • Anxiety    • Arthritis    • Back pain at L4-L5 level    • Schreiber esophagus    • Bulimia    • Colon polyp    • Disease of thyroid gland     hypothyroid   • GERD (gastroesophageal reflux disease)    • Hearing loss in left ear    • History of transfusion    • Hyperlipidemia    • Hypertension    • Hypothyroidism    • Leukopenia    • NMS (neuroleptic malignant syndrome) 01/03/2020   • Pneumonia    • RA (rheumatoid arthritis) ([address])     in feet   • Sciatica    • Seizure ([address])     due to medication mix up 8/2018 only one historically   • Skin spots, red     lower right arm - poss from cat- no s/s infection   • Synovial cyst of lumbar spine    • Vertigo    • Wears dentures     full set   • Weight gain        Past Surgical History:   Procedure Laterality Date   • BONE MARROW BIOPSY     • BREAST SURGERY      enlargement with implants   • CLOSED REDUCTION DISTAL FEMUR FRACTURE     • COLONOSCOPY     • COSMETIC SURGERY     • DENTAL SURGERY     • HIP ARTHROPLASTY Right 01/02/2020    Procedure: REVISION TOTAL HIP ARTHROPLASTY WITH REPAIR OF PARIPROSTHETIC FRACTURE - POSTERIOR APPROACH;  Surgeon: Tianna Reed MD;  Location: [facility] MAIN OR;  Service: Orthopedics   • HI AMPUTATION METATARSAL W/TOE SINGLE Left 04/07/2023    Procedure: AMPUTATION TOE 4th;  Surgeon: Nusrat Wheeler DPM;  Location: [address] [place] [address] MAIN OR;  Service: Podiatry   • HI ARTHRP ACETBLR/PROX FEM PROSTC AGRFT/ALGRFT Right 12/11/2019    Procedure: ARTHROPLASTY HIP TOTAL ANTERIOR;  Surgeon: Tianna Reed MD;  Location: [facility] MAIN OR;  Service: Orthopedics   • HI ESOPHAGOGASTRODUODENOSCOPY TRANSORAL DIAGNOSTIC N/A 05/11/2021    Procedure: ESOPHAGOGASTRODUODENOSCOPY (EGD); Surgeon:  Rick Franks MD;  Location: [facility] MAIN OR;  Service: General   • HI LAPS RPR PARAESPHGL HRNA INCL [facility] [address] N/A 05/11/2021 Procedure: REPAIR HERNIA PARAESOPHAGEAL  LAPAROSCOPIC;  Surgeon: Daniel Arceo MD;  Location: [facility] MAIN OR;  Service: General   • CA UNLISTED PROCEDURE ESOPHAGUS N/A 05/11/2021    Procedure: FUNDOPLICATION TRANSORAL INCISIONLESS;  Surgeon: Tate Baumann MD;  Location: [facility] MAIN OR;  Service: Gastroenterology   • RHINOPLASTY     • SINUS SURGERY     • TOE AMPUTATION Left     2023 4th toe   • TRANSORAL INCISIONLESS FUNDOPLICATION (TIF)  00/10/3640         Current Outpatient Medications:   •  amLODIPine (NORVASC) 5 mg tablet, TAKE 1 TABLET (5 MG TOTAL) BY MOUTH DAILY. , Disp: 90 tablet, Rfl: 3  •  ferrous sulfate 325 (65 Fe) mg tablet, Take 1 tablet (325 mg total) by mouth 2 (two) times a day with meals, Disp: 60 tablet, Rfl: 0  •  folic acid (FOLVITE) 957 mcg tablet, Take 1 tablet (400 mcg total) by mouth daily, Disp: 30 tablet, Rfl: 0  •  levothyroxine 25 mcg tablet, TAKE 1 TABLET BY MOUTH EVERY DAY, Disp: 90 tablet, Rfl: 3  •  LORazepam (ATIVAN) 2 mg tablet, Take 1 tablet (2 mg total) by mouth every 6 (six) hours as needed for anxiety, Disp: 120 tablet, Rfl: 1  •  ondansetron (ZOFRAN) 4 mg tablet, Take 1 tablet (4 mg total) by mouth every 8 (eight) hours as needed for nausea or vomiting, Disp: 20 tablet, Rfl: 5  •  pantoprazole (PROTONIX) 40 mg tablet, Take 1 tablet (40 mg total) by mouth 2 (two) times a day, Disp: 180 tablet, Rfl: 3  •  PARoxetine (PAXIL) 30 mg tablet, Take 2 tablets (60 mg total) by mouth in the morning, Disp: 180 tablet, Rfl: 3  •  QUEtiapine (SEROquel XR) 400 mg 24 hr tablet, Take 1 tablet (400 mg total) by mouth daily at bedtime, Disp: 90 tablet, Rfl: 3  •  sucralfate (CARAFATE) 1 g tablet, TAKE 1 TABLET (1 G TOTAL) BY MOUTH 3 (THREE) TIMES A DAY WITH MEALS, Disp: 270 tablet, Rfl: 3  •  triamcinolone (KENALOG) 0.1 % cream, APPLY TOPICALLY 2 TIMES A DAY AS NEEDED FOR RASH., Disp: 160 g, Rfl: 5  •  vitamin B-12 (VITAMIN B-12) 1,000 mcg tablet, Take 1 tablet (1,000 mcg total) by mouth daily, Disp: 90 tablet, Rfl: 1  •  ziprasidone (GEODON) 80 mg capsule, TAKE 1 CAPSULE BY MOUTH EVERY DAY, Disp: 90 capsule, Rfl: 3    Allergies   Allergen Reactions   • Risperdal [Risperidone] Shortness Of Breath     Rapid heart beat, SOB   • Zyprexa [Olanzapine] Shortness Of Breath     Rapid heartbeat   • Latex Rash     Band aids, adhesives wears normal underwear, can eat bananas  USE PAPER TAPE       Physical Exam:   Vitals:    07/05/23 0915   BP: 127/81   Pulse: 87   Resp: 18   Temp: 98.2 °F (36.8 °C)   SpO2: 96%     General: Awake, Alert, Oriented x 3, Mood and affect appropriate  Respiratory: Respirations even and unlabored  Cardiovascular: Peripheral pulses intact; no edema  Musculoskeletal Exam: Bilateral lumbar paraspinals tender to palpation    ASA Score: 3         Assessment: Lumbar spondylosis    Plan:  bilateral L3-5 medial branch blocks

## 2023-07-07 ENCOUNTER — HOSPITAL ENCOUNTER (OUTPATIENT)
Dept: INFUSION CENTER | Facility: HOSPITAL | Age: 63
End: 2023-07-07
Attending: INTERNAL MEDICINE
Payer: MEDICARE

## 2023-07-07 VITALS
RESPIRATION RATE: 18 BRPM | DIASTOLIC BLOOD PRESSURE: 78 MMHG | SYSTOLIC BLOOD PRESSURE: 117 MMHG | TEMPERATURE: 97.8 F | HEART RATE: 80 BPM

## 2023-07-07 DIAGNOSIS — D50.0 IRON DEFICIENCY ANEMIA DUE TO CHRONIC BLOOD LOSS: Primary | ICD-10-CM

## 2023-07-07 DIAGNOSIS — D50.0 IRON DEFICIENCY ANEMIA DUE TO CHRONIC BLOOD LOSS: ICD-10-CM

## 2023-07-07 PROCEDURE — 96366 THER/PROPH/DIAG IV INF ADDON: CPT

## 2023-07-07 PROCEDURE — 96365 THER/PROPH/DIAG IV INF INIT: CPT

## 2023-07-07 RX ORDER — SODIUM CHLORIDE 9 MG/ML
20 INJECTION, SOLUTION INTRAVENOUS ONCE
Status: COMPLETED | OUTPATIENT
Start: 2023-07-07 | End: 2023-07-07

## 2023-07-07 RX ORDER — SODIUM CHLORIDE 9 MG/ML
20 INJECTION, SOLUTION INTRAVENOUS ONCE
Status: CANCELLED | OUTPATIENT
Start: 2023-07-13

## 2023-07-07 RX ADMIN — IRON SUCROSE 300 MG: 20 INJECTION, SOLUTION INTRAVENOUS at 09:02

## 2023-07-07 RX ADMIN — SODIUM CHLORIDE 20 ML/HR: 0.9 INJECTION, SOLUTION INTRAVENOUS at 09:01

## 2023-07-10 DIAGNOSIS — D50.0 IRON DEFICIENCY ANEMIA DUE TO CHRONIC BLOOD LOSS: Primary | ICD-10-CM

## 2023-07-10 RX ORDER — SODIUM CHLORIDE 9 MG/ML
20 INJECTION, SOLUTION INTRAVENOUS ONCE
Status: CANCELLED | OUTPATIENT
Start: 2023-07-12

## 2023-07-12 ENCOUNTER — HOSPITAL ENCOUNTER (OUTPATIENT)
Dept: INFUSION CENTER | Facility: HOSPITAL | Age: 63
Discharge: HOME | End: 2023-07-12
Attending: INTERNAL MEDICINE
Payer: MEDICARE

## 2023-07-12 VITALS
HEART RATE: 100 BPM | RESPIRATION RATE: 18 BRPM | DIASTOLIC BLOOD PRESSURE: 89 MMHG | SYSTOLIC BLOOD PRESSURE: 137 MMHG | TEMPERATURE: 97.6 F

## 2023-07-12 DIAGNOSIS — D50.0 IRON DEFICIENCY ANEMIA DUE TO CHRONIC BLOOD LOSS: ICD-10-CM

## 2023-07-12 DIAGNOSIS — D50.0 IRON DEFICIENCY ANEMIA DUE TO CHRONIC BLOOD LOSS: Primary | ICD-10-CM

## 2023-07-12 PROCEDURE — 96365 THER/PROPH/DIAG IV INF INIT: CPT

## 2023-07-12 RX ORDER — SODIUM CHLORIDE 9 MG/ML
20 INJECTION, SOLUTION INTRAVENOUS ONCE
Status: COMPLETED | OUTPATIENT
Start: 2023-07-12 | End: 2023-07-12

## 2023-07-12 RX ORDER — SODIUM CHLORIDE 9 MG/ML
20 INJECTION, SOLUTION INTRAVENOUS ONCE
Status: CANCELLED | OUTPATIENT
Start: 2023-07-12

## 2023-07-12 RX ADMIN — SODIUM CHLORIDE 20 ML/HR: 0.9 INJECTION, SOLUTION INTRAVENOUS at 10:03

## 2023-07-12 RX ADMIN — IRON SUCROSE 300 MG: 20 INJECTION, SOLUTION INTRAVENOUS at 10:03

## 2023-07-12 NOTE — PROGRESS NOTES
Patient here for venofer, offers no complaints. Tolerated infusion without incident. Patient aware of no more appointments at the infusion center, AVS provided.

## 2023-07-16 ENCOUNTER — APPOINTMENT (EMERGENCY)
Dept: RADIOLOGY | Facility: HOSPITAL | Age: 63
End: 2023-07-16
Payer: MEDICARE

## 2023-07-16 ENCOUNTER — HOSPITAL ENCOUNTER (EMERGENCY)
Facility: HOSPITAL | Age: 63
Discharge: HOME | End: 2023-07-16
Attending: EMERGENCY MEDICINE
Payer: MEDICARE

## 2023-07-16 VITALS
RESPIRATION RATE: 18 BRPM | HEART RATE: 78 BPM | SYSTOLIC BLOOD PRESSURE: 116 MMHG | OXYGEN SATURATION: 98 % | TEMPERATURE: 98.6 F | DIASTOLIC BLOOD PRESSURE: 68 MMHG
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
## 2023-07-16 DIAGNOSIS — S09.90XA INJURY OF HEAD, INITIAL ENCOUNTER: ICD-10-CM

## 2023-07-16 LAB
ANION GAP SERPL CALCULATED.3IONS-SCNC: 3 MMOL/L
ATRIAL RATE: 85 BPM
ATRIAL RATE: 93 BPM
BASOPHILS # BLD AUTO: 0.04 THOUSANDS/ÂΜL (ref 0–0.1)
BASOPHILS NFR BLD AUTO: 1 % (ref 0–1)
BUN SERPL-MCNC: 10 MG/DL (ref 5–25)
CALCIUM SERPL-MCNC: 9.3 MG/DL (ref 8.3–10.1)
CHLORIDE SERPL-SCNC: 100 MMOL/L (ref 96–108)
CO2 SERPL-SCNC: 27 MMOL/L (ref 21–32)
CREAT SERPL-MCNC: 0.57 MG/DL (ref 0.6–1.3)
EOSINOPHIL # BLD AUTO: 0.13 THOUSAND/ÂΜL (ref 0–0.61)
EOSINOPHIL NFR BLD AUTO: 4 % (ref 0–6)
ERYTHROCYTE [DISTWIDTH] IN BLOOD BY AUTOMATED COUNT: 21.2 % (ref 11.6–15.1)
GFR SERPL CREATININE-BSD FRML MDRD: 99 ML/MIN/1.73SQ M
GLUCOSE SERPL-MCNC: 86 MG/DL (ref 65–140)
HCT VFR BLD AUTO: 36.6 % (ref 34.8–46.1)
HGB BLD-MCNC: 12 G/DL (ref 11.5–15.4)
IMM GRANULOCYTES # BLD AUTO: 0.01 THOUSAND/UL (ref 0–0.2)
IMM GRANULOCYTES NFR BLD AUTO: 0 % (ref 0–2)
LYMPHOCYTES # BLD AUTO: 0.42 THOUSANDS/ÂΜL (ref 0.6–4.47)
LYMPHOCYTES NFR BLD AUTO: 14 % (ref 14–44)
MCH RBC QN AUTO: 25.9 PG (ref 26.8–34.3)
MCHC RBC AUTO-ENTMCNC: 32.8 G/DL (ref 31.4–37.4)
MCV RBC AUTO: 79 FL (ref 82–98)
MONOCYTES # BLD AUTO: 0.29 THOUSAND/ÂΜL (ref 0.17–1.22)
MONOCYTES NFR BLD AUTO: 10 % (ref 4–12)
NEUTROPHILS # BLD AUTO: 2.16 THOUSANDS/ÂΜL (ref 1.85–7.62)
NEUTS SEG NFR BLD AUTO: 71 % (ref 43–75)
NRBC BLD AUTO-RTO: 0 /100 WBCS
P AXIS: 65 DEGREES
P AXIS: 66 DEGREES
PLATELET # BLD AUTO: 185 THOUSANDS/UL (ref 149–390)
PMV BLD AUTO: 8.1 FL (ref 8.9–12.7)
POTASSIUM SERPL-SCNC: 3.6 MMOL/L (ref 3.5–5.3)
PR INTERVAL: 130 MS
PR INTERVAL: 142 MS
QRS AXIS: 18 DEGREES
QRS AXIS: 22 DEGREES
QRSD INTERVAL: 80 MS
QRSD INTERVAL: 82 MS
QT INTERVAL: 358 MS
QT INTERVAL: 362 MS
QTC INTERVAL: 430 MS
QTC INTERVAL: 445 MS
RBC # BLD AUTO: 4.64 MILLION/UL (ref 3.81–5.12)
SODIUM SERPL-SCNC: 130 MMOL/L (ref 135–147)
T WAVE AXIS: 66 DEGREES
T WAVE AXIS: 73 DEGREES
VENTRICULAR RATE: 85 BPM
VENTRICULAR RATE: 93 BPM
WBC # BLD AUTO: 3.05 THOUSAND/UL (ref 4.31–10.16)

## 2023-07-16 PROCEDURE — 72128 CT CHEST SPINE W/O DYE: CPT

## 2023-07-16 PROCEDURE — 80048 BASIC METABOLIC PNL TOTAL CA: CPT

## 2023-07-16 PROCEDURE — 36415 COLL VENOUS BLD VENIPUNCTURE: CPT

## 2023-07-16 PROCEDURE — 93005 ELECTROCARDIOGRAM TRACING: CPT

## 2023-07-16 PROCEDURE — G1004 CDSM NDSC: HCPCS

## 2023-07-16 PROCEDURE — 72125 CT NECK SPINE W/O DYE: CPT

## 2023-07-16 PROCEDURE — 85025 COMPLETE CBC W/AUTO DIFF WBC: CPT

## 2023-07-16 PROCEDURE — 99285 EMERGENCY DEPT VISIT HI MDM: CPT

## 2023-07-16 PROCEDURE — 70450 CT HEAD/BRAIN W/O DYE: CPT

## 2023-07-16 PROCEDURE — 72131 CT LUMBAR SPINE W/O DYE: CPT

## 2023-07-16 NOTE — ED ATTENDING ATTESTATION
7/16/2023  ISarah MD, saw and evaluated the patient. I have discussed the patient with the resident/non-physician practitioner and agree with the resident's/non-physician practitioner's findings, Plan of Care, and MDM as documented in the resident's/non-physician practitioner's note, except where noted. All available labs and Radiology studies were reviewed. I was present for key portions of any procedure(s) performed by the resident/non-physician practitioner and I was immediately available to provide assistance. At this point I agree with the current assessment done in the Emergency Department.   I have conducted an independent evaluation of this patient a history and physical is as follows:  Pt states she fell at [employer] fell on face no loc pt since then has had ha nv and blurred vision Pt takes asa PE: alert no obvious facial trauma neck mild tenderness and t spine neuro nonfocal heart reg lungs clear abd soft nontender MDM: will ct head and neck  ED Course         Critical Care Time  Procedures

## 2023-07-16 NOTE — ED PROVIDER NOTES
Chief Complaint   Patient presents with   • Fall     Pt arrived via EMS. Fall 11Am yesterday, +HS +aspirin -LOC, on carpet then clothing rack fell on face, no visible wounds on face. Pt reports being on ground for unknown amount of time. Dizziness, blurry vision, back pain and N/V started yesterday afternoon. Denies N/V at this time. History of Present Illness and Review of Systems   This is a 58 y.o. female with PMH significant for anxiety, GERD, HTN, HLD, RA coming in today with complaint of fall. This reportedly happened yesterday, she reports she suffered a mechanical fall tripping on the carpet and then a clothing rack fell on her face. She reports she has been having some headaches and some lightheadedness. Also describes some blurry vision. She reports she is having midthoracic back pain as well. Otherwise denies any chest pain shortness of breath, emesis, nausea, cough hemoptysis, abdominal pain, urinary retention, difficulties ambulating. She does take aspirin daily. She does have a history of GI bleeds and rheumatoid arthritis. Denies any presyncopal feelings or feelings of palpitations. No other symptoms currently. Remainder of ROS Reviewed and Non-Pertinent    No other complaints for this encounter.    - No language barrier.   - History obtained from patient and chart   - Reviewed relevant past medical/family/social history  - There are no limitations to the history obtained.     Past Medical, Past Surgical History:    has a past medical history of Anemia, Anxiety, Arthritis, Back pain at L4-L5 level, Schreiber esophagus, Bulimia, Colon polyp, Disease of thyroid gland, GERD (gastroesophageal reflux disease), Hearing loss in left ear, History of transfusion, Hyperlipidemia, Hypertension, Hypothyroidism, Leukopenia, NMS (neuroleptic malignant syndrome) (01/03/2020), Pneumonia, RA (rheumatoid arthritis) ([address]), Sciatica, Seizure ([address]), Skin spots, red, Synovial cyst of lumbar spine, Vertigo, Wears dentures, and Weight gain. has a past surgical history that includes Rhinoplasty; Bone marrow biopsy; Colonoscopy; pr arthrp acetblr/prox fem prostc agrft/algrft (Right, 12/11/2019); Hip Arthroplasty (Right, 01/02/2020); Sinus surgery; Cosmetic surgery; Closed reduction distal femur fracture; pr laps rpr paraesphgl hrna incl fundplsty w/mesh (N/A, 05/11/2021); pr esophagogastroduodenoscopy transoral diagnostic (N/A, 05/11/2021); pr unlisted procedure esophagus (N/A, 05/11/2021); Transoral incisionless fundoplication (TIF) (36/87/1487); Breast surgery; Dental surgery; pr amputation metatarsal w/toe single (Left, 04/07/2023); and Toe amputation (Left). Allergies:      Allergies   Allergen Reactions   • Risperdal [Risperidone] Shortness Of Breath     Rapid heart beat, SOB   • Zyprexa [Olanzapine] Shortness Of Breath     Rapid heartbeat   • Latex Rash     Band aids, adhesives wears normal underwear, can eat bananas  USE PAPER TAPE       Social and Family History:     Social History     Substance and Sexual Activity   Alcohol Use Not Currently     Social History     Tobacco Use   Smoking Status Never   Smokeless Tobacco Never     Social History     Substance and Sexual Activity   Drug Use Not Currently       Physical Examination     Vitals:    07/16/23 0900 07/16/23 0915 07/16/23 0930 07/16/23 1000   BP: 127/71 117/68 135/70 116/68   BP Location:       Pulse: 84 82 80 78   Resp: 18 18 20 18   Temp:       TempSrc:       SpO2: 95% 93% 97% 98%       Physical Exam: Patient is calm, comfortable, in no acute distress, head is atraumatic, EOM intact, pupils are equal and reactive, no evidence of nystagmus or entrapment, mucous members are moist, no intraoral lesions or evidence of bleeding or lacerations in her oropharynx, negative C-spine tenderness, she does have midline thoracic and lumbar bony tenderness, heart sounds are notable regular rate without appreciable murmurs, lungs are clear to auscultation bilaterally, no evidence of chest wall tenderness or ecchymoses, negative abdominal tenderness, abdomen soft no evidence of distention, she has no evidence of bony tenderness and her 4 extremities, negative peripheral edema, 2+ pulses in her distal extremities, cranial nerves II through XII are grossly intact, no focal deficits, behaviorally at her baseline      Risk Stratification Tools                Orders Placed This Encounter   Procedures   • CT head without contrast   • CT cervical spine without contrast   • CT spine thoracic & lumbar wo contrast   • CBC and differential   • Basic metabolic panel   • Ambulatory Referral to Comprehensive Concussion Program   • ECG 12 lead   • ECG 12 lead       Labs:     Labs Reviewed   CBC AND DIFFERENTIAL - Abnormal       Result Value Ref Range Status    WBC 3.05 (*) 4.31 - 10.16 Thousand/uL Final    RBC 4.64  3.81 - 5.12 Million/uL Final    Hemoglobin 12.0  11.5 - 15.4 g/dL Final    Hematocrit 36.6  34.8 - 46.1 % Final    MCV 79 (*) 82 - 98 fL Final    MCH 25.9 (*) 26.8 - 34.3 pg Final    MCHC 32.8  31.4 - 37.4 g/dL Final    RDW 21.2 (*) 11.6 - 15.1 % Final    MPV 8.1 (*) 8.9 - 12.7 fL Final    Platelets 419  849 - 390 Thousands/uL Final    nRBC 0  /100 WBCs Final    Neutrophils Relative 71  43 - 75 % Final    Immat GRANS % 0  0 - 2 % Final    Lymphocytes Relative 14  14 - 44 % Final    Monocytes Relative 10  4 - 12 % Final    Eosinophils Relative 4  0 - 6 % Final    Basophils Relative 1  0 - 1 % Final    Neutrophils Absolute 2.16  1.85 - 7.62 Thousands/µL Final    Immature Grans Absolute 0.01  0.00 - 0.20 Thousand/uL Final    Lymphocytes Absolute 0.42 (*) 0.60 - 4.47 Thousands/µL Final    Monocytes Absolute 0.29  0.17 - 1.22 Thousand/µL Final    Eosinophils Absolute 0.13  0.00 - 0.61 Thousand/µL Final    Basophils Absolute 0.04  0.00 - 0.10 Thousands/µL Final   BASIC METABOLIC PANEL - Abnormal    Sodium 130 (*) 135 - 147 mmol/L Final    Potassium 3.6  3.5 - 5.3 mmol/L Final Chloride 100  96 - 108 mmol/L Final    CO2 27  21 - 32 mmol/L Final    ANION GAP 3  mmol/L Final    BUN 10  5 - 25 mg/dL Final    Creatinine 0.57 (*) 0.60 - 1.30 mg/dL Final    Comment: Standardized to IDMS reference method    Glucose 86  65 - 140 mg/dL Final    Comment: Specimen collection should occur prior to Sulfasalazine administration due to the potential for falsely depressed results. Specimen collection should occur prior to Sulfapyridine administration due to the potential for falsely elevated results. If the patient is fasting, the ADA then defines impaired fasting glucose as > 100 mg/dL and diabetes as > or equal to 123 mg/dL. Calcium 9.3  8.3 - 10.1 mg/dL Final    eGFR 99  ml/min/1.73sq m Final    Narrative:     [place] guidelines for Chronic Kidney Disease (CKD):   •  Stage 1 with normal or high GFR (GFR > 90 mL/min/1.73 square meters)  •  Stage 2 Mild CKD (GFR = 60-89 mL/min/1.73 square meters)  •  Stage 3A Moderate CKD (GFR = 45-59 mL/min/1.73 square meters)  •  Stage 3B Moderate CKD (GFR = 30-44 mL/min/1.73 square meters)  •  Stage 4 Severe CKD (GFR = 15-29 mL/min/1.73 square meters)  •  Stage 5 End Stage CKD (GFR <15 mL/min/1.73 square meters)  Note: GFR calculation is accurate only with a steady state creatinine       Imaging:     CT spine thoracic & lumbar wo contrast   Final Result by Elke Escudero MD (07/16 9824)      No acute fracture or new malalignment. 6 mm right lung nodule which, in retrospect, is stable from prior chest CT from 12/17/2022 (and possibly from May 20, 2021 although there was some atelectasis in that region of the lung at that time which partially obscures the finding). This is probably    benign, however, a 1 year follow-up chest CT may be appropriate for continued surveillance.             Workstation performed: [id number]         CT head without contrast   Final Result by Elke Escudero MD (49/81 5050)      No acute intracranial abnormality. Workstation performed: [id number]         CT cervical spine without contrast   Final Result by Ryan Johnson MD (07/16 0920)      No cervical spine fracture or traumatic malalignment. Workstation performed: [id number]                Procedures   Procedures      MDM:   Medical Decision Making  Oziel Velasco is a 58 y.o. who presents with complaints of status post fall    Vital signs are within normal limits, physical exam shows patient has thoracic bony tenderness and lumbar tenderness, otherwise unremarkable examination on secondary survey    Ddx: Overall concerning for cyst SDH given her history of aspirin use and her age. Also may have compression fractures given her high risk history of rheumatoid arthritis. She was activated as a level C given her aspirin use and head strike and symptoms. Minimal concern for intra-abdominal or intrathoracic or mediastinal etiology given her examination findings. Her fall sounds mechanical in nature however given her history of low hemoglobin levels will obtain further work-up to evaluate for this as well. Plan: Workup will include CT head C-spine T and L-spine, CBC, BMP. Will monitor closely and reassess. Reassessment/Disposition: Traumatic evaluation negative. Lab work unremarkable. Patient ambulated without difficulty. Therefore felt safe to send home. Advised on return precautions and close follow-up. Amount and/or Complexity of Data Reviewed  Labs: ordered. Decision-making details documented in ED Course. Radiology: ordered. ED Course as of 07/16/23 1637   Sun Jul 16, 2023   1001 CT work-up unremarkable   1001 Hemoglobin: 12.0   1001 eGFR: 99   1001 Pt ambulating at baseline, feels okay. Will send home with concussion referral and close follow up      Final Dispo   Final Diagnosis:  1. Fall, initial encounter    2.  Injury of head, initial encounter      Time reflects when diagnosis was documented in both MDM as applicable and the Disposition within this note     Time User Action Codes Description Comment    7/16/2023 10:05 AM Home Peed Add [Q46. QQMC] Fall, initial encounter     7/16/2023 10:05 AM Home Deckered Add [S09.90XA] Injury of head, initial encounter       ED Disposition     ED Disposition   Discharge    Condition   Stable    Date/Time   Sun Jul 16, 2023 10:05 AM    Comment   Mireyafeilsha Sharp discharge to home/self care. Follow-up Information     Follow up With Specialties Details Why Contact Info Additional Information    Radha Paige MD Internal Medicine Call   [address] [facility]        [address] [place] [address] Emergency Department Emergency Medicine  If symptoms worsen [address] [address]  [facility] Emergency Department, [address] [place] [address], [address] [phone]        Medications - No data to display    All details of the evaluation and treatment plan were made clear and additionally all questions and concerns were addressed while under my care. Portions of the record may have been created with voice recognition software. Occasional wrong word or "sound a like" substitutions may have occurred due to the inherent limitations of voice recognition software. Read the chart carefully and recognize, using context, where substitutions have occurred. The attending physician physically available and evaluated the above patient alongside myself.         Darnell Acuna MD  07/16/23 0357

## 2023-07-16 NOTE — DISCHARGE INSTRUCTIONS
Your workup here was not concerning for anything dangerous. Therefore there is no need for you to stay at the hospital for further testing. We feel safe to send you home. You can use Tylenol for management of your symptoms. You should follow up with your PCP to assess for resolution of your symptoms and to determine if there is any further evaluation that needs to be performed. Return to the emergency department if you have any symptoms of worsening pain or headaches    Thank you for choosing [address] [place] [address] Street for your care!

## 2023-07-17 ENCOUNTER — VBI (OUTPATIENT)
Dept: ADMINISTRATIVE | Facility: OTHER | Age: 63
End: 2023-07-17

## 2023-07-17 NOTE — TELEPHONE ENCOUNTER
Mae Dejesus    ED Visit Information     Ed visit date: 7/16/2023  Diagnosis Description: Fall, initial encounter  In Network? Yes [address]  Discharge status: Home  Discharged with meds ? No  Number of ED visits to date: 5  ED Severity:3     Outreach Information    Outreach successful: Yes 3  Date letter mailed:7/19/2023  Date Finalized:7/19/2023    Care Coordination    Follow up appointment with pcp: no follow up not scheduled  Transportation issues ?  NA    [facility]    Outreach type: 3 Day Outreach  Emergent necessity warranted by diagnosis: Yes  [facility] PCP: Yes  Transportation: Ambulance Transport  07/17/2023 12:59 PM EDT by Jessica Valadez 07/17/2023 12:59 PM EDT by Danie Hernandez [address] (Self) [phone] (CYCP)[phone] (Home)  [phone] (Home) Remove  - Left MessageCommunicated - [email or web address]    07/18/2023 12:15 PM EDT by Jessica Valadez 07/18/2023 12:15 PM EDT by Danie Hernandez [address] (Self) [phone] (SPJF)[phone] (Home)  [phone] (Home) Remove  - Left MessageCommunicated - LMOMx2    07/19/2023 01:06 PM EDT by Jessica Valadez 07/19/2023 01:06 PM EDT by Danie Hernandez [address] (Self) [phone] (YLEM)[phone] (Home)  [phone] (Home) Remove  - Left MessageCommunicated - LMOMx3 - MyChart letter sent

## 2023-07-17 NOTE — LETTER
[facility]  [address] [address]    Date: 07/19/23  [address] [address]    Dear Sabine Ribera:                                                                                                                              Thank you for choosing [facility] emergency department for care. Your primary care provider wants to make sure that your ongoing medical care is being addressed. If you require follow up care as a result of your emergency department visit, there are a few things the practice would like you to know. As part of the network's continuing commitment to caring for our patients, we have added more same day appointments and have extended office hours to meet your medical needs. After hours, on-call physicians are available via your primary care provider's main office line. We encourage you to contact our office prior to seeking treatment to discuss your symptoms with the medical staff. Together, we can determine the correct course of action. A majority of non-emergent conditions such as: common cold, flu-like symptoms, fevers, strains/sprains, dislocations, minor burns, cuts and animal bites can be treated at [facility]. Diagnostic testing is available at some sites. Of course, if you are experiencing a life threatening medical emergency call 911 or proceed directly to the nearest emergency room. Your nearest [facility] facility is conveniently located at:    [facility], [address] [place] [address]  [phone] [address] [place] [address] offered at most [facility] your spot online at [email or web address] or on the [address] [place] [address] Drive    Sincerely,    [facility]  No information on file.

## 2023-07-18 ENCOUNTER — HOSPITAL ENCOUNTER (EMERGENCY)
Facility: HOSPITAL | Age: 63
Discharge: HOME | End: 2023-07-18
Attending: EMERGENCY MEDICINE | Admitting: EMERGENCY MEDICINE
Payer: MEDICARE

## 2023-07-18 ENCOUNTER — APPOINTMENT (EMERGENCY)
Dept: RADIOLOGY | Facility: HOSPITAL | Age: 63
End: 2023-07-18
Payer: MEDICARE

## 2023-07-18 VITALS
TEMPERATURE: 98.1 F | DIASTOLIC BLOOD PRESSURE: 69 MMHG | RESPIRATION RATE: 16 BRPM | SYSTOLIC BLOOD PRESSURE: 133 MMHG | OXYGEN SATURATION: 96 % | HEART RATE: 89 BPM

## 2023-07-18 DIAGNOSIS — M54.16 LUMBAR RADICULOPATHY: ICD-10-CM

## 2023-07-18 DIAGNOSIS — R51.9 HEADACHE: Primary | ICD-10-CM

## 2023-07-18 LAB
ATRIAL RATE: 83 BPM
P AXIS: 62 DEGREES
PR INTERVAL: 134 MS
QRS AXIS: 24 DEGREES
QRSD INTERVAL: 78 MS
QT INTERVAL: 364 MS
QTC INTERVAL: 427 MS
T WAVE AXIS: 66 DEGREES
VENTRICULAR RATE: 83 BPM

## 2023-07-18 PROCEDURE — 99285 EMERGENCY DEPT VISIT HI MDM: CPT

## 2023-07-18 PROCEDURE — G1004 CDSM NDSC: HCPCS

## 2023-07-18 PROCEDURE — 96374 THER/PROPH/DIAG INJ IV PUSH: CPT

## 2023-07-18 PROCEDURE — 93010 ELECTROCARDIOGRAM REPORT: CPT | Performed by: INTERNAL MEDICINE

## 2023-07-18 PROCEDURE — 93005 ELECTROCARDIOGRAM TRACING: CPT

## 2023-07-18 PROCEDURE — 70450 CT HEAD/BRAIN W/O DYE: CPT

## 2023-07-18 RX ORDER — LIDOCAINE 50 MG/G
1 PATCH TOPICAL ONCE
Status: DISCONTINUED | OUTPATIENT
Start: 2023-07-18 | End: 2023-07-18 | Stop reason: HOSPADM

## 2023-07-18 RX ORDER — METHOCARBAMOL 500 MG/1
500 TABLET, FILM COATED ORAL ONCE
Status: COMPLETED | OUTPATIENT
Start: 2023-07-18 | End: 2023-07-18

## 2023-07-18 RX ORDER — ACETAMINOPHEN 325 MG/1
975 TABLET ORAL ONCE
Status: COMPLETED | OUTPATIENT
Start: 2023-07-18 | End: 2023-07-18

## 2023-07-18 RX ORDER — LIDOCAINE 50 MG/G
1 PATCH TOPICAL DAILY
Qty: 5 PATCH | Refills: 0 | Status: SHIPPED | OUTPATIENT
Start: 2023-07-18

## 2023-07-18 RX ORDER — KETOROLAC TROMETHAMINE 30 MG/ML
15 INJECTION, SOLUTION INTRAMUSCULAR; INTRAVENOUS ONCE
Status: COMPLETED | OUTPATIENT
Start: 2023-07-18 | End: 2023-07-18

## 2023-07-18 RX ADMIN — ACETAMINOPHEN 975 MG: 325 TABLET, FILM COATED ORAL at 10:21

## 2023-07-18 RX ADMIN — KETOROLAC TROMETHAMINE 15 MG: 30 INJECTION, SOLUTION INTRAMUSCULAR; INTRAVENOUS at 10:20

## 2023-07-18 RX ADMIN — METHOCARBAMOL 500 MG: 500 TABLET ORAL at 10:21

## 2023-07-18 NOTE — ED PROVIDER NOTES
History  Chief Complaint   Patient presents with   • Back Pain     Pt brought in via EMS after falling down 10 steps two nights ago, +HS, -thinners. +dizziness, HA, denies anything numbness/tingling in extremeties     66-year-old female past medical history of schizoaffective disorder, anxiety, hypothyroidism, Schreiber's esophagus, iron deficiency anemia presents ED complaining of headache after a fall 2 days ago. Patient states that she was seen in the ED 2 days ago prior to this fall for complaint of head and back pain. She had a normal CT head CT C-spine, CT T throughout L spine completed. She was discharged home in stable condition. She tells me that after discharge from the ED, she continued to have a headache and dizziness and tells me that she slipped and fell down 10 steps in her basement. Patient did hit the right side of her head in the fall, she is on aspirin. She did not seek care over the past 2 days as she was hoping that the pain would go away. She was able to get up on her own has been ambulating on her own without any focal 2. Headache is located frontally, does not radiate. Not associated with photophobia or phonophobia. No nausea or vomiting. No weakness numbness or tingling. Prior to Admission Medications   Prescriptions Last Dose Informant Patient Reported? Taking? LORazepam (ATIVAN) 2 mg tablet   No No   Sig: Take 1 tablet (2 mg total) by mouth every 6 (six) hours as needed for anxiety   PARoxetine (PAXIL) 30 mg tablet   No No   Sig: Take 2 tablets (60 mg total) by mouth in the morning   QUEtiapine (SEROquel XR) 400 mg 24 hr tablet   No No   Sig: Take 1 tablet (400 mg total) by mouth daily at bedtime   amLODIPine (NORVASC) 5 mg tablet  Self No No   Sig: TAKE 1 TABLET (5 MG TOTAL) BY MOUTH DAILY.    ferrous sulfate 325 (65 Fe) mg tablet   No No   Sig: Take 1 tablet (325 mg total) by mouth 2 (two) times a day with meals   folic acid (FOLVITE) 907 mcg tablet   No No   Sig: Take 1 tablet (400 mcg total) by mouth daily   levothyroxine 25 mcg tablet   No No   Sig: TAKE 1 TABLET BY MOUTH EVERY DAY   ondansetron (ZOFRAN) 4 mg tablet   No No   Sig: Take 1 tablet (4 mg total) by mouth every 8 (eight) hours as needed for nausea or vomiting   pantoprazole (PROTONIX) 40 mg tablet  Self No No   Sig: Take 1 tablet (40 mg total) by mouth 2 (two) times a day   sucralfate (CARAFATE) 1 g tablet   No No   Sig: TAKE 1 TABLET (1 G TOTAL) BY MOUTH 3 (THREE) TIMES A DAY WITH MEALS   triamcinolone (KENALOG) 0.1 % cream   No No   Sig: APPLY TOPICALLY 2 TIMES A DAY AS NEEDED FOR RASH.    vitamin B-12 (VITAMIN B-12) 1,000 mcg tablet   No No   Sig: Take 1 tablet (1,000 mcg total) by mouth daily   ziprasidone (GEODON) 80 mg capsule   No No   Sig: TAKE 1 CAPSULE BY MOUTH EVERY DAY      Facility-Administered Medications: None       Past Medical History:   Diagnosis Date   • Anemia    • Anxiety    • Arthritis    • Back pain at L4-L5 level    • Schreiber esophagus    • Bulimia    • Colon polyp    • Disease of thyroid gland     hypothyroid   • GERD (gastroesophageal reflux disease)    • Hearing loss in left ear    • History of transfusion    • Hyperlipidemia    • Hypertension    • Hypothyroidism    • Leukopenia    • NMS (neuroleptic malignant syndrome) 01/03/2020   • Pneumonia    • RA (rheumatoid arthritis) ([address])     in feet   • Sciatica    • Seizure ([address])     due to medication mix up 8/2018 only one historically   • Skin spots, red     lower right arm - poss from cat- no s/s infection   • Synovial cyst of lumbar spine    • Vertigo    • Wears dentures     full set   • Weight gain        Past Surgical History:   Procedure Laterality Date   • BONE MARROW BIOPSY     • BREAST SURGERY      enlargement with implants   • CLOSED REDUCTION DISTAL FEMUR FRACTURE     • COLONOSCOPY     • COSMETIC SURGERY     • DENTAL SURGERY     • HIP ARTHROPLASTY Right 01/02/2020    Procedure: REVISION TOTAL HIP ARTHROPLASTY WITH REPAIR OF [address];  Surgeon: Irlanda Ruiz MD;  Location: [facility] MAIN [facility];  Service: Orthopedics   • PA AMPUTATION METATARSAL W/TOE SINGLE Left 04/07/2023    Procedure: AMPUTATION TOE 4th;  Surgeon: Saint Echevaria, DPM;  Location: [facility] MAIN OR;  Service: Podiatry   • PA ARTHRP ACETBLR/PROX FEM PROSTC AGRFT/ALGRFT Right 12/11/2019    Procedure: ARTHROPLASTY HIP TOTAL ANTERIOR;  Surgeon: Irlanda Ruiz MD;  Location: [facility];  Service: Orthopedics   • PA ESOPHAGOGASTRODUODENOSCOPY TRANSORAL DIAGNOSTIC N/A 05/11/2021    Procedure: ESOPHAGOGASTRODUODENOSCOPY (EGD); Surgeon: Luba Butt MD;  Location: [facility];  Service: General   • PA LAPS RPR PARAESPHGL HRNA INCL FUNDPLSTY [address] N/A 05/11/2021    Procedure: REPAIR HERNIA PARAESOPHAGEAL  LAPAROSCOPIC;  Surgeon: Luba Butt MD;  Location: [facility];  Service: General   • PA UNLISTED PROCEDURE ESOPHAGUS N/A 05/11/2021    Procedure: FUNDOPLICATION TRANSORAL INCISIONLESS;  Surgeon: Justina Prader, MD;  Location: [facility];  Service: Gastroenterology   • RHINOPLASTY     • SINUS SURGERY     • TOE AMPUTATION Left     2023 4th toe   • TRANSORAL INCISIONLESS FUNDOPLICATION (TIF)  97/43/5750       Family History   Problem Relation Age of Onset   • Uterine cancer Mother    • Esophageal cancer Father    • Colon cancer Maternal Grandmother      I have reviewed and agree with the history as documented. E-Cigarette/Vaping   • E-Cigarette Use Never User      E-Cigarette/Vaping Substances   • Nicotine No    • THC No    • CBD No    • Flavoring No    • Other No    • Unknown No      Social History     Tobacco Use   • Smoking status: Never   • Smokeless tobacco: Never   Vaping Use   • Vaping Use: Never used   Substance Use Topics   • Alcohol use: Not Currently   • Drug use: Not Currently        Review of Systems   Constitutional: Negative for chills and fever. HENT: Negative for ear pain and sore throat.     Eyes: Negative for pain and visual disturbance. Respiratory: Negative for cough and shortness of breath. Cardiovascular: Negative for chest pain and palpitations. Gastrointestinal: Negative for abdominal pain and vomiting. Genitourinary: Negative for dysuria and hematuria. Musculoskeletal: Positive for back pain. Negative for arthralgias. Skin: Negative for color change and rash. Neurological: Positive for headaches. Negative for seizures and syncope. Psychiatric/Behavioral: The patient is nervous/anxious. All other systems reviewed and are negative. Physical Exam  ED Triage Vitals [07/18/23 0940]   Temperature Pulse Respirations Blood Pressure SpO2   98.1 °F (36.7 °C) 89 16 133/69 96 %      Temp Source Heart Rate Source Patient Position - Orthostatic VS BP Location FiO2 (%)   Oral Monitor Sitting Left arm --      Pain Score       10 - Worst Possible Pain             Orthostatic Vital Signs  Vitals:    07/18/23 0940   BP: 133/69   Pulse: 89   Patient Position - Orthostatic VS: Sitting       Physical Exam  Vitals and nursing note reviewed. Constitutional:       General: She is not in acute distress. Appearance: She is well-developed. HENT:      Head: Normocephalic and atraumatic. Eyes:      Conjunctiva/sclera: Conjunctivae normal.   Cardiovascular:      Rate and Rhythm: Normal rate and regular rhythm. Heart sounds: No murmur heard. Pulmonary:      Effort: Pulmonary effort is normal. No respiratory distress. Breath sounds: Normal breath sounds. Abdominal:      Palpations: Abdomen is soft. Tenderness: There is no abdominal tenderness. Musculoskeletal:         General: No swelling. Cervical back: Neck supple. Skin:     General: Skin is warm and dry. Capillary Refill: Capillary refill takes less than 2 seconds. Comments: Abrasion present to the T-spine, old and healing. Abrasions present to the R posterior forearm, old and healing.  No bony tenderness to the humerus or forearm. Neurological:      Mental Status: She is alert. GCS: GCS eye subscore is 4. GCS verbal subscore is 5. GCS motor subscore is 6. Motor: Motor function is intact. Psychiatric:         Mood and Affect: Affect is blunt. Speech: Speech is delayed. ED Medications  Medications   lidocaine (LIDODERM) 5 % patch 1 patch (1 patch Topical Not Given 7/18/23 1021)   ketorolac (TORADOL) injection 15 mg (15 mg Intravenous Given 7/18/23 1020)   acetaminophen (TYLENOL) tablet 975 mg (975 mg Oral Given 7/18/23 1021)   methocarbamol (ROBAXIN) tablet 500 mg (500 mg Oral Given 7/18/23 1021)       Diagnostic Studies  Results Reviewed     None                 CT head wo contrast   Final Result by Cristian Yancey MD (07/18 1140)      No acute intracranial abnormality. Workstation performed: [id number]               Procedures  Procedures      ED Course                             SBIRT 20yo+    Flowsheet Row Most Recent Value   Initial Alcohol Screen: US AUDIT-C     1. How often do you have a drink containing alcohol? 0 Filed at: 07/18/2023 0943   2. How many drinks containing alcohol do you have on a typical day you are drinking? 0 Filed at: 07/18/2023 0943   3a. Male UNDER 65: How often do you have five or more drinks on one occasion? 0 Filed at: 07/18/2023 0943   3b. FEMALE Any Age, or MALE 65+: How often do you have 4 or more drinks on one occassion? 0 Filed at: 07/18/2023 0943   Audit-C Score 0 Filed at: 07/18/2023 3207   MIA: How many times in the past year have you. .. Used an illegal drug or used a prescription medication for non-medical reasons? Never Filed at: 07/18/2023 5034                Medical Decision Making  60-year-old female past medical history of schizoaffective disorder, anxiety, hypothyroidism, Schreiber's esophagus, iron deficiency anemia presents ED complaining of headache after a fall 2 days ago.      DDx: Intracranial bleed/fracture, migraine, tension headache, anxiety. Patient evaluated recently for headache and back pain however has had a fall in the interim since being discharged from the emergency room. Patient's neuro exam entirely nonfocal.  We will plan to treat patient's headache and muscle aches with Toradol, Tylenol, Robaxin and lidocaine patch. We will obtain CT head to rule out new intracranial bleed/fracture. If within normal limits, will reevaluate patient's symptoms and plan to discharge patient home if improved. CT head unremarkable, entirely improved with symptomatic treatment. Patient ambulating all over ED floor without any difficulty or help, looking for newspaper to occupy her time. Discussed with patient need to continue conservative treatment at home with Tylenol and lidocaine patches to lower back as needed for pain and need to follow-up with PCP for further management. Patient understanding, discharged home in stable condition to follow-up with PCP. Amount and/or Complexity of Data Reviewed  Radiology: ordered. Risk  OTC drugs. Prescription drug management. Disposition  Final diagnoses:   Headache   Lumbar radiculopathy     Time reflects when diagnosis was documented in both MDM as applicable and the Disposition within this note     Time User Action Codes Description Comment    7/18/2023 11:50 AM [place] Cheese Add [R51.9] Headache     7/18/2023 11:51 AM [place] Cheese Add [M54.16] Lumbar radiculopathy       ED Disposition     ED Disposition   Discharge    Condition   Stable    Date/Time   Tue Jul 18, 2023 11:51 AM    Comment   Asif Braga discharge to home/self care.                Follow-up Information     Follow up With Specialties Details Why Contact Naomy Stewart MD Internal Medicine Schedule an appointment as soon as possible for a visit   [address] [place] [address]  [phone]            Discharge Medication List as of 7/18/2023 11:51 AM      START taking these medications Details   lidocaine (Lidoderm) 5 % Apply 1 patch topically over 12 hours daily Remove & Discard patch within 12 hours or as directed by MD, Starting Tue 7/18/2023, Normal         CONTINUE these medications which have NOT CHANGED    Details   amLODIPine (NORVASC) 5 mg tablet TAKE 1 TABLET (5 MG TOTAL) BY MOUTH DAILY. , Starting Tue 1/24/2023, Normal      ferrous sulfate 325 (65 Fe) mg tablet Take 1 tablet (325 mg total) by mouth 2 (two) times a day with meals, Starting Sat 1/01/8833, Normal      folic acid (FOLVITE) 495 mcg tablet Take 1 tablet (400 mcg total) by mouth daily, Starting Fri 6/2/2023, Normal      levothyroxine 25 mcg tablet TAKE 1 TABLET BY MOUTH EVERY DAY, Normal      LORazepam (ATIVAN) 2 mg tablet Take 1 tablet (2 mg total) by mouth every 6 (six) hours as needed for anxiety, Starting Fri 6/30/2023, Normal      ondansetron (ZOFRAN) 4 mg tablet Take 1 tablet (4 mg total) by mouth every 8 (eight) hours as needed for nausea or vomiting, Starting Fri 3/24/2023, Normal      pantoprazole (PROTONIX) 40 mg tablet Take 1 tablet (40 mg total) by mouth 2 (two) times a day, Starting Tue 1/24/2023, Normal      PARoxetine (PAXIL) 30 mg tablet Take 2 tablets (60 mg total) by mouth in the morning, Starting Mon 5/22/2023, Normal      QUEtiapine (SEROquel XR) 400 mg 24 hr tablet Take 1 tablet (400 mg total) by mouth daily at bedtime, Starting Fri 5/26/2023, Normal      sucralfate (CARAFATE) 1 g tablet TAKE 1 TABLET (1 G TOTAL) BY MOUTH 3 (THREE) TIMES A DAY WITH MEALS, Starting Sat 3/25/2023, Normal      triamcinolone (KENALOG) 0.1 % cream APPLY TOPICALLY 2 TIMES A DAY AS NEEDED FOR RASH., Normal      vitamin B-12 (VITAMIN B-12) 1,000 mcg tablet Take 1 tablet (1,000 mcg total) by mouth daily, Starting Fri 6/2/2023, Normal      ziprasidone (GEODON) 80 mg capsule TAKE 1 CAPSULE BY MOUTH EVERY DAY, Normal           No discharge procedures on file.     PDMP Review       Value Time User    PDMP Reviewed  Yes 6/30/2023  9:30 AM Naheed Conenll MD           ED Provider  Attending physically available and evaluated Jhon Verde. I managed the patient along with the ED Attending.     Electronically Signed by         Di Beckwith MD  07/18/23 2344

## 2023-07-18 NOTE — ED ATTENDING ATTESTATION
7/18/2023  INathalie MD, saw and evaluated the patient. I have discussed the patient with the resident/non-physician practitioner and agree with the resident's/non-physician practitioner's findings, Plan of Care, and MDM as documented in the resident's/non-physician practitioner's note, except where noted. All available labs and Radiology studies were reviewed. I was present for key portions of any procedure(s) performed by the resident/non-physician practitioner and I was immediately available to provide assistance. At this point I agree with the current assessment done in the Emergency Department. I have conducted an independent evaluation of this patient a history and physical is as follows:    Patient reports that on Saturday she suffered a fall. The patient came to the emergency department and had multiple tests performed including a CT of the thoracic and lumbar spine, CT of the cervical spine, and head CT. The patient complained of back pain at the time and reports now that she has had back pain chronically for which she sees pain management. The patient reports she slipped and fell down 10 steps on Sunday and since then has had a headache. That is the reason the patient returns today's because of a mild persistent headache since the fall on Sunday evening. The patient reports she continues to have back pain but it is identical to the pain she had when she was seen previously in the emergency department and had a CT of her spine. The patient denies any weakness, paresthesias, loss of bowel or bladder function, abdominal pain, chest pain, confusion, or change in mental status. Physical exam demonstrates a pleasant alert nontoxic female no acute distress. HEENT exam was normal.  There was no hemotympanum. There is no otorrhea or rhinorrhea. There is no swenson sign or raccoon sign. The neck was supple and nontender. Lungs are clear with equal breath sounds.   There was mild lumbar paraspinal muscle tenderness bilaterally but no midline tenderness. The thoracic back was nontender. All extremities are nontender with a full range of motion to all joints. The patient was alert and oriented x3 with normal cranial nerves. The patient normal strength and sensation all extremities. Gait was normal.  Skin had no rash.   ED Course         Critical Care Time  Procedures

## 2023-07-21 ENCOUNTER — OFFICE VISIT (OUTPATIENT)
Dept: INTERNAL MEDICINE CLINIC | Facility: CLINIC | Age: 63
End: 2023-07-21
Payer: MEDICARE

## 2023-07-21 VITALS
SYSTOLIC BLOOD PRESSURE: 158 MMHG | BODY MASS INDEX: 33.54 KG/M2 | WEIGHT: 183.4 LBS | OXYGEN SATURATION: 98 % | HEART RATE: 98 BPM | DIASTOLIC BLOOD PRESSURE: 92 MMHG

## 2023-07-21 DIAGNOSIS — R51.9 NONINTRACTABLE HEADACHE, UNSPECIFIED CHRONICITY PATTERN, UNSPECIFIED HEADACHE TYPE: Primary | ICD-10-CM

## 2023-07-21 DIAGNOSIS — R11.0 NAUSEA: ICD-10-CM

## 2023-07-21 PROCEDURE — 99214 OFFICE O/P EST MOD 30 MIN: CPT | Performed by: INTERNAL MEDICINE

## 2023-07-21 RX ORDER — CELECOXIB 200 MG/1
200 CAPSULE ORAL DAILY
Qty: 7 CAPSULE | Refills: 0 | Status: SHIPPED | OUTPATIENT
Start: 2023-07-21

## 2023-07-21 RX ORDER — ONDANSETRON 4 MG/1
4 TABLET, FILM COATED ORAL EVERY 8 HOURS PRN
Qty: 20 TABLET | Refills: 5 | Status: SHIPPED | OUTPATIENT
Start: 2023-07-21

## 2023-07-21 NOTE — ASSESSMENT & PLAN NOTE
2 recent CAT scans have been normal.  Patient has been using Tylenol which has not helped enough. No lethargy or mental status changes. Will give short course of pain medication for the headache understanding risks fo GI upset with NSAIDs, follow-up if not improving.   No neck pain

## 2023-07-21 NOTE — PATIENT INSTRUCTIONS
Problem List Items Addressed This Visit          Other    Headache - Primary     2 recent CAT scans have been normal.  Patient has been using Tylenol which has not helped enough. No lethargy or mental status changes. Will give short course of pain medication for the headache understanding risks fo GI upset with NSAIDs, follow-up if not improving.   No neck pain         Relevant Medications    celecoxib (CeleBREX) 200 mg capsule     Other Visit Diagnoses       Nausea        Relevant Medications    ondansetron (ZOFRAN) 4 mg tablet

## 2023-07-21 NOTE — PROGRESS NOTES
Name: Asif Braga      : 1960      MRN: [id number]  Encounter Provider: Grecia Stewart MD  Encounter Date: 2023   Encounter department: [facility]     1. Nonintractable headache, unspecified chronicity pattern, unspecified headache type  Assessment & Plan:  2 recent CAT scans have been normal.  Patient has been using Tylenol which has not helped enough. No lethargy or mental status changes. Will give short course of pain medication for the headache understanding risks fo GI upset with NSAIDs, follow-up if not improving. No neck pain    Orders:  -     celecoxib (CeleBREX) 200 mg capsule; Take 1 capsule (200 mg total) by mouth daily    2. Nausea  -     ondansetron (ZOFRAN) 4 mg tablet; Take 1 tablet (4 mg total) by mouth every 8 (eight) hours as needed for nausea or vomiting           Subjective     Pt reports a clothing rack fell on her last weekend. She thinks she got her foot caught on a rack and pulled it over onto herself. No loss of consciousness. She went to the ED for eval.the next day. She was feeling a little dizzy with some headache. She then fell down some stairs at home and went back to the ED. No loss of consciousness. Patient had CT scan of the head to the emergency room which was normal.  She had another CT the head when she went back to the emergency room a couple of days later which was also normal.  She also had CAT scans of the cervical and thoracic spine which were normal.    Review of Systems   Constitutional: Negative for chills and fever. Cardiovascular: Negative for palpitations. Gastrointestinal: Negative for nausea (no new nausea) and vomiting. Musculoskeletal: Positive for arthralgias and back pain. Negative for myalgias. Neurological: Positive for dizziness, light-headedness and headaches.        Past Medical History:   Diagnosis Date   • Anemia    • Anxiety    • Arthritis    • Back pain at L4-L5 level    • Schreiber esophagus    • Bulimia    • Colon polyp    • Disease of thyroid gland     hypothyroid   • GERD (gastroesophageal reflux disease)    • Hearing loss in left ear    • History of transfusion    • Hyperlipidemia    • Hypertension    • Hypothyroidism    • Leukopenia    • NMS (neuroleptic malignant syndrome) 01/03/2020   • Pneumonia    • RA (rheumatoid arthritis) ([address])     in feet   • Sciatica    • Seizure ([address])     due to medication mix up 8/2018 only one historically   • Skin spots, red     lower right arm - poss from cat- no s/s infection   • Synovial cyst of lumbar spine    • Vertigo    • Wears dentures     full set   • Weight gain      Past Surgical History:   Procedure Laterality Date   • BONE MARROW BIOPSY     • BREAST SURGERY      enlargement with implants   • CLOSED REDUCTION DISTAL FEMUR FRACTURE     • COLONOSCOPY     • COSMETIC SURGERY     • DENTAL SURGERY     • HIP ARTHROPLASTY Right 01/02/2020    Procedure: REVISION TOTAL HIP ARTHROPLASTY WITH REPAIR OF PARIPROSTHETIC FRACTURE - POSTERIOR APPROACH;  Surgeon: Tianna Reed MD;  Location: [facility];  Service: Orthopedics   • ME AMPUTATION METATARSAL W/TOE SINGLE Left 04/07/2023    Procedure: AMPUTATION TOE 4th;  Surgeon: Nusrat Wheeler DPM;  Location: [facility] MA[facility] OR;  Service: Podiatry   • ME ARTHRP ACETBLR/PROX FEM PROSTC AGRFT/ALGRFT Right 12/11/2019    Procedure: ARTHROPLASTY HIP TOTAL ANTERIOR;  Surgeon: Tianna Reed MD;  Location: [facility];  Service: Orthopedics   • ME ESOPHAGOGASTRODUODENOSCOPY TRANSORAL DIAGNOSTIC N/A 05/11/2021    Procedure: ESOPHAGOGASTRODUODENOSCOPY (EGD); Surgeon: Rick Franks MD;  Location: [facility];  Service: General   • ME LAPS RPR PARAESPHGL HRNA INCL FUNDPLSTY [address] N/A 05/11/2021    Procedure: REPAIR HERNIA PARAESOPHAGEAL  LAPAROSCOPIC;  Surgeon:  Rick Franks MD;  Location: [facility];  Service: General   • ME UNLISTED PROCEDURE ESOPHAGUS N/A 05/11/2021    Procedure: FUNDOPLICATION TRANSORAL INCISIONLESS; Surgeon: Lora Yancey MD;  Location: [facility];  Service: Gastroenterology   • RHINOPLASTY     • SINUS SURGERY     • TOE AMPUTATION Left     2023 4th toe   • TRANSORAL INCISIONLESS FUNDOPLICATION (TIF)  64/57/4611     Family History   Problem Relation Age of Onset   • Uterine cancer Mother    • Esophageal cancer Father    • Colon cancer Maternal Grandmother      Social History     Socioeconomic History   • Marital status: Single     Spouse name: None   • Number of children: None   • Years of education: None   • Highest education level: None   Occupational History   • None   Tobacco Use   • Smoking status: Never   • Smokeless tobacco: Never   Vaping Use   • Vaping Use: Never used   Substance and Sexual Activity   • Alcohol use: Not Currently   • Drug use: Not Currently   • Sexual activity: Not Currently   Other Topics Concern   • None   Social History Narrative    Family disruption death of family member parent, per [employer]     Social Determinants of Health     Financial Resource Strain: Medium Risk (1/4/2023)    Overall Financial Resource Strain (CARDIA)    • Difficulty of Paying Living Expenses: Somewhat hard   Food Insecurity: No Food Insecurity (12/20/2022)    Hunger Vital Sign    • Worried About Running Out of Food in the Last Year: Never true    • Ran Out of Food in the Last Year: Never true   Transportation Needs: No Transportation Needs (1/4/2023)    PRAPARE - Transportation    • Lack of Transportation (Medical): No    • Lack of Transportation (Non-Medical): No   Physical Activity: Not on file   Stress: Stress Concern Present (5/18/2021)    [facility]    • Feeling of Stress :  To some extent   Social Connections: Not on file   Intimate Partner Violence: Not on file   Housing Stability: Low Risk  (12/20/2022)    Housing Stability Vital Sign    • Unable to Pay for Housing in the Last Year: No    • Number of Places Lived in the Last Year: 1 • Unstable Housing in the Last Year: No     Current Outpatient Medications on File Prior to Visit   Medication Sig   • amLODIPine (NORVASC) 5 mg tablet TAKE 1 TABLET (5 MG TOTAL) BY MOUTH DAILY. • ferrous sulfate 325 (65 Fe) mg tablet Take 1 tablet (325 mg total) by mouth 2 (two) times a day with meals   • folic acid (FOLVITE) 460 mcg tablet Take 1 tablet (400 mcg total) by mouth daily   • levothyroxine 25 mcg tablet TAKE 1 TABLET BY MOUTH EVERY DAY   • lidocaine (Lidoderm) 5 % Apply 1 patch topically over 12 hours daily Remove & Discard patch within 12 hours or as directed by MD   • LORazepam (ATIVAN) 2 mg tablet Take 1 tablet (2 mg total) by mouth every 6 (six) hours as needed for anxiety   • pantoprazole (PROTONIX) 40 mg tablet Take 1 tablet (40 mg total) by mouth 2 (two) times a day   • PARoxetine (PAXIL) 30 mg tablet Take 2 tablets (60 mg total) by mouth in the morning   • QUEtiapine (SEROquel XR) 400 mg 24 hr tablet Take 1 tablet (400 mg total) by mouth daily at bedtime   • sucralfate (CARAFATE) 1 g tablet TAKE 1 TABLET (1 G TOTAL) BY MOUTH 3 (THREE) TIMES A DAY WITH MEALS   • triamcinolone (KENALOG) 0.1 % cream APPLY TOPICALLY 2 TIMES A DAY AS NEEDED FOR RASH.    • vitamin B-12 (VITAMIN B-12) 1,000 mcg tablet Take 1 tablet (1,000 mcg total) by mouth daily   • ziprasidone (GEODON) 80 mg capsule TAKE 1 CAPSULE BY MOUTH EVERY DAY   • [DISCONTINUED] ondansetron (ZOFRAN) 4 mg tablet Take 1 tablet (4 mg total) by mouth every 8 (eight) hours as needed for nausea or vomiting     Allergies   Allergen Reactions   • Risperdal [Risperidone] Shortness Of Breath     Rapid heart beat, SOB   • Zyprexa [Olanzapine] Shortness Of Breath     Rapid heartbeat   • Latex Rash     Band aids, adhesives wears normal underwear, can eat bananas  USE PAPER TAPE     Immunization History   Administered Date(s) Administered   • COVID-19 PFIZER VACCINE 0.3 ML IM 03/20/2021, 04/10/2021, 10/09/2021   • H1N1, All Formulations 11/17/2009   • INFLUENZA 11/17/2009, 01/04/2013, 11/11/2014, 10/20/2015, 10/31/2016, 09/16/2018   • Influenza Injectable, MDCK, Preservative Free, Quadrivalent, 0.5 mL 09/26/2019   • Influenza Quadrivalent, 6-35 Months IM 10/31/2016   • Influenza, injectable, quadrivalent, preservative free 0.5 mL 09/05/2020   • Influenza, recombinant, quadrivalent,injectable, preservative free 11/04/2021   • Pneumococcal Conjugate 13-Valent 09/16/2018   • Rabies 07/29/2014, 08/01/2014, 08/05/2014   • Tdap 07/29/2014, 12/17/2022       Objective     /92   Pulse 98   Wt 83.2 kg (183 lb 6.4 oz)   SpO2 98%   BMI 33.54 kg/m²     Physical Exam  Vitals reviewed. Constitutional:       General: She is not in acute distress. Cardiovascular:      Rate and Rhythm: Normal rate and regular rhythm. Heart sounds: Normal heart sounds. No murmur heard. Pulmonary:      Effort: Pulmonary effort is normal. No respiratory distress. Breath sounds: Normal breath sounds. Musculoskeletal:      Cervical back: Normal range of motion and neck supple. No rigidity. Neurological:      Mental Status: She is alert.       Comments: Normal gait, no vertical or rotational nystagmus, no vertical deviation of eye when uncovered, good finger-nose movement       Star Landon MD

## 2023-07-23 ENCOUNTER — HOSPITAL ENCOUNTER (INPATIENT)
Facility: HOSPITAL | Age: 63
LOS: 2 days | Discharge: HOME | End: 2023-07-25
Attending: EMERGENCY MEDICINE | Admitting: HOSPITALIST
Payer: MEDICARE

## 2023-07-23 ENCOUNTER — APPOINTMENT (EMERGENCY)
Dept: RADIOLOGY | Facility: HOSPITAL | Age: 63
End: 2023-07-23
Payer: MEDICARE

## 2023-07-23 DIAGNOSIS — J18.9 PNEUMONIA: Primary | ICD-10-CM

## 2023-07-23 DIAGNOSIS — J18.9 PNEUMONIA OF RIGHT LOWER LOBE DUE TO INFECTIOUS ORGANISM: ICD-10-CM

## 2023-07-23 DIAGNOSIS — R09.02 HYPOXIA: ICD-10-CM

## 2023-07-23 PROBLEM — J96.01 ACUTE RESPIRATORY FAILURE WITH HYPOXIA (HCC): Status: ACTIVE | Noted: 2023-07-23

## 2023-07-23 PROBLEM — R50.9 FEVER: Status: RESOLVED | Noted: 2020-05-19 | Resolved: 2023-07-23

## 2023-07-23 LAB
2HR DELTA HS TROPONIN: 6 NG/L
4HR DELTA HS TROPONIN: 6 NG/L
ALBUMIN SERPL BCP-MCNC: 3.6 G/DL (ref 3.5–5)
ALP SERPL-CCNC: 84 U/L (ref 46–116)
ALT SERPL W P-5'-P-CCNC: 35 U/L (ref 12–78)
ANION GAP SERPL CALCULATED.3IONS-SCNC: 6 MMOL/L
AST SERPL W P-5'-P-CCNC: 46 U/L (ref 5–45)
ATRIAL RATE: 128 BPM
BASOPHILS # BLD AUTO: 0.02 THOUSANDS/ÂΜL (ref 0–0.1)
BASOPHILS NFR BLD AUTO: 0 % (ref 0–1)
BILIRUB SERPL-MCNC: 0.25 MG/DL (ref 0.2–1)
BUN SERPL-MCNC: 13 MG/DL (ref 5–25)
CALCIUM SERPL-MCNC: 11.5 MG/DL (ref 8.3–10.1)
CARDIAC TROPONIN I PNL SERPL HS: 12 NG/L
CARDIAC TROPONIN I PNL SERPL HS: 18 NG/L
CARDIAC TROPONIN I PNL SERPL HS: 18 NG/L
CHLORIDE SERPL-SCNC: 98 MMOL/L (ref 96–108)
CO2 SERPL-SCNC: 26 MMOL/L (ref 21–32)
CREAT SERPL-MCNC: 0.88 MG/DL (ref 0.6–1.3)
EOSINOPHIL # BLD AUTO: 0.05 THOUSAND/ÂΜL (ref 0–0.61)
EOSINOPHIL NFR BLD AUTO: 1 % (ref 0–6)
ERYTHROCYTE [DISTWIDTH] IN BLOOD BY AUTOMATED COUNT: 19.9 % (ref 11.6–15.1)
FLUAV RNA RESP QL NAA+PROBE: NEGATIVE
FLUBV RNA RESP QL NAA+PROBE: NEGATIVE
GFR SERPL CREATININE-BSD FRML MDRD: 70 ML/MIN/1.73SQ M
GLUCOSE SERPL-MCNC: 95 MG/DL (ref 65–140)
HCT VFR BLD AUTO: 39 % (ref 34.8–46.1)
HGB BLD-MCNC: 12.8 G/DL (ref 11.5–15.4)
IMM GRANULOCYTES # BLD AUTO: 0.01 THOUSAND/UL (ref 0–0.2)
IMM GRANULOCYTES NFR BLD AUTO: 0 % (ref 0–2)
LACTATE SERPL-SCNC: 2.4 MMOL/L (ref 0.5–2)
LACTATE SERPL-SCNC: 2.6 MMOL/L (ref 0.5–2)
LIPASE SERPL-CCNC: 57 U/L (ref 73–393)
LYMPHOCYTES # BLD AUTO: 0.18 THOUSANDS/ÂΜL (ref 0.6–4.47)
LYMPHOCYTES NFR BLD AUTO: 4 % (ref 14–44)
MCH RBC QN AUTO: 26.2 PG (ref 26.8–34.3)
MCHC RBC AUTO-ENTMCNC: 32.8 G/DL (ref 31.4–37.4)
MCV RBC AUTO: 80 FL (ref 82–98)
MONOCYTES # BLD AUTO: 0.25 THOUSAND/ÂΜL (ref 0.17–1.22)
MONOCYTES NFR BLD AUTO: 6 % (ref 4–12)
NEUTROPHILS # BLD AUTO: 3.96 THOUSANDS/ÂΜL (ref 1.85–7.62)
NEUTS SEG NFR BLD AUTO: 89 % (ref 43–75)
NRBC BLD AUTO-RTO: 0 /100 WBCS
P AXIS: 62 DEGREES
PLATELET # BLD AUTO: 194 THOUSANDS/UL (ref 149–390)
PMV BLD AUTO: 8 FL (ref 8.9–12.7)
POTASSIUM SERPL-SCNC: 5.4 MMOL/L (ref 3.5–5.3)
PR INTERVAL: 138 MS
PROCALCITONIN SERPL-MCNC: 0.41 NG/ML
PROT SERPL-MCNC: 7.2 G/DL (ref 6.4–8.4)
QRS AXIS: -10 DEGREES
QRSD INTERVAL: 74 MS
QT INTERVAL: 280 MS
QTC INTERVAL: 408 MS
RBC # BLD AUTO: 4.89 MILLION/UL (ref 3.81–5.12)
RSV RNA RESP QL NAA+PROBE: NEGATIVE
SARS-COV-2 RNA RESP QL NAA+PROBE: NEGATIVE
SODIUM SERPL-SCNC: 130 MMOL/L (ref 135–147)
T WAVE AXIS: 70 DEGREES
VENTRICULAR RATE: 128 BPM
WBC # BLD AUTO: 4.47 THOUSAND/UL (ref 4.31–10.16)

## 2023-07-23 PROCEDURE — 80053 COMPREHEN METABOLIC PANEL: CPT

## 2023-07-23 PROCEDURE — 96368 THER/DIAG CONCURRENT INF: CPT

## 2023-07-23 PROCEDURE — 85025 COMPLETE CBC W/AUTO DIFF WBC: CPT

## 2023-07-23 PROCEDURE — 99285 EMERGENCY DEPT VISIT HI MDM: CPT | Performed by: EMERGENCY MEDICINE

## 2023-07-23 PROCEDURE — 96365 THER/PROPH/DIAG IV INF INIT: CPT

## 2023-07-23 PROCEDURE — 96375 TX/PRO/DX INJ NEW DRUG ADDON: CPT

## 2023-07-23 PROCEDURE — 0241U HB NFCT DS VIR RESP RNA 4 TRGT: CPT | Performed by: FAMILY MEDICINE

## 2023-07-23 PROCEDURE — 83690 ASSAY OF LIPASE: CPT

## 2023-07-23 PROCEDURE — 84145 PROCALCITONIN (PCT): CPT

## 2023-07-23 PROCEDURE — 83605 ASSAY OF LACTIC ACID: CPT

## 2023-07-23 PROCEDURE — 99285 EMERGENCY DEPT VISIT HI MDM: CPT

## 2023-07-23 PROCEDURE — 87449 NOS EACH ORGANISM AG IA: CPT | Performed by: FAMILY MEDICINE

## 2023-07-23 PROCEDURE — 84484 ASSAY OF TROPONIN QUANT: CPT

## 2023-07-23 PROCEDURE — 93010 ELECTROCARDIOGRAM REPORT: CPT | Performed by: INTERNAL MEDICINE

## 2023-07-23 PROCEDURE — 71046 X-RAY EXAM CHEST 2 VIEWS: CPT

## 2023-07-23 PROCEDURE — 87040 BLOOD CULTURE FOR BACTERIA: CPT

## 2023-07-23 PROCEDURE — 93005 ELECTROCARDIOGRAM TRACING: CPT

## 2023-07-23 PROCEDURE — 96361 HYDRATE IV INFUSION ADD-ON: CPT

## 2023-07-23 PROCEDURE — 99223 1ST HOSP IP/OBS HIGH 75: CPT | Performed by: FAMILY MEDICINE

## 2023-07-23 PROCEDURE — 36415 COLL VENOUS BLD VENIPUNCTURE: CPT

## 2023-07-23 RX ORDER — QUETIAPINE 200 MG/1
400 TABLET, FILM COATED, EXTENDED RELEASE ORAL
Status: DISCONTINUED | OUTPATIENT
Start: 2023-07-23 | End: 2023-07-25 | Stop reason: HOSPADM

## 2023-07-23 RX ORDER — LORAZEPAM 1 MG/1
2 TABLET ORAL EVERY 6 HOURS PRN
Status: DISCONTINUED | OUTPATIENT
Start: 2023-07-23 | End: 2023-07-25 | Stop reason: HOSPADM

## 2023-07-23 RX ORDER — AMLODIPINE BESYLATE 5 MG/1
5 TABLET ORAL DAILY
Status: DISCONTINUED | OUTPATIENT
Start: 2023-07-24 | End: 2023-07-25 | Stop reason: HOSPADM

## 2023-07-23 RX ORDER — LANOLIN ALCOHOL/MO/W.PET/CERES
400 CREAM (GRAM) TOPICAL DAILY
Status: DISCONTINUED | OUTPATIENT
Start: 2023-07-24 | End: 2023-07-25 | Stop reason: HOSPADM

## 2023-07-23 RX ORDER — SODIUM CHLORIDE, SODIUM GLUCONATE, SODIUM ACETATE, POTASSIUM CHLORIDE, MAGNESIUM CHLORIDE, SODIUM PHOSPHATE, DIBASIC, AND POTASSIUM PHOSPHATE .53; .5; .37; .037; .03; .012; .00082 G/100ML; G/100ML; G/100ML; G/100ML; G/100ML; G/100ML; G/100ML
1000 INJECTION, SOLUTION INTRAVENOUS ONCE
Status: COMPLETED | OUTPATIENT
Start: 2023-07-23 | End: 2023-07-23

## 2023-07-23 RX ORDER — PANTOPRAZOLE SODIUM 40 MG/1
40 TABLET, DELAYED RELEASE ORAL 2 TIMES DAILY
Status: DISCONTINUED | OUTPATIENT
Start: 2023-07-23 | End: 2023-07-25 | Stop reason: HOSPADM

## 2023-07-23 RX ORDER — GUAIFENESIN 600 MG/1
600 TABLET, EXTENDED RELEASE ORAL 2 TIMES DAILY
Status: DISCONTINUED | OUTPATIENT
Start: 2023-07-23 | End: 2023-07-25 | Stop reason: HOSPADM

## 2023-07-23 RX ORDER — ACETAMINOPHEN 325 MG/1
650 TABLET ORAL EVERY 6 HOURS PRN
Status: DISCONTINUED | OUTPATIENT
Start: 2023-07-23 | End: 2023-07-25 | Stop reason: HOSPADM

## 2023-07-23 RX ORDER — LEVOTHYROXINE SODIUM 0.03 MG/1
25 TABLET ORAL
Status: DISCONTINUED | OUTPATIENT
Start: 2023-07-24 | End: 2023-07-25 | Stop reason: HOSPADM

## 2023-07-23 RX ORDER — PAROXETINE HYDROCHLORIDE 20 MG/1
60 TABLET, FILM COATED ORAL DAILY
Status: DISCONTINUED | OUTPATIENT
Start: 2023-07-23 | End: 2023-07-25 | Stop reason: HOSPADM

## 2023-07-23 RX ORDER — SUCRALFATE 1 G/1
1 TABLET ORAL
Status: DISCONTINUED | OUTPATIENT
Start: 2023-07-23 | End: 2023-07-25 | Stop reason: HOSPADM

## 2023-07-23 RX ORDER — ZIPRASIDONE HYDROCHLORIDE 40 MG/1
80 CAPSULE ORAL DAILY
Status: DISCONTINUED | OUTPATIENT
Start: 2023-07-24 | End: 2023-07-25 | Stop reason: HOSPADM

## 2023-07-23 RX ORDER — AZITHROMYCIN 250 MG/1
500 TABLET, FILM COATED ORAL EVERY 24 HOURS
Status: DISCONTINUED | OUTPATIENT
Start: 2023-07-24 | End: 2023-07-24

## 2023-07-23 RX ORDER — ONDANSETRON 2 MG/ML
4 INJECTION INTRAMUSCULAR; INTRAVENOUS EVERY 6 HOURS PRN
Status: DISCONTINUED | OUTPATIENT
Start: 2023-07-23 | End: 2023-07-25 | Stop reason: HOSPADM

## 2023-07-23 RX ORDER — ENOXAPARIN SODIUM 100 MG/ML
40 INJECTION SUBCUTANEOUS DAILY
Status: DISCONTINUED | OUTPATIENT
Start: 2023-07-24 | End: 2023-07-25 | Stop reason: HOSPADM

## 2023-07-23 RX ORDER — FERROUS SULFATE 325(65) MG
325 TABLET ORAL 2 TIMES DAILY WITH MEALS
Status: DISCONTINUED | OUTPATIENT
Start: 2023-07-23 | End: 2023-07-25 | Stop reason: HOSPADM

## 2023-07-23 RX ORDER — ONDANSETRON 2 MG/ML
4 INJECTION INTRAMUSCULAR; INTRAVENOUS ONCE
Status: COMPLETED | OUTPATIENT
Start: 2023-07-23 | End: 2023-07-23

## 2023-07-23 RX ORDER — MECLIZINE HYDROCHLORIDE 25 MG/1
25 TABLET ORAL ONCE
Status: COMPLETED | OUTPATIENT
Start: 2023-07-23 | End: 2023-07-23

## 2023-07-23 RX ADMIN — QUETIAPINE FUMARATE 400 MG: 200 TABLET, EXTENDED RELEASE ORAL at 21:23

## 2023-07-23 RX ADMIN — SUCRALFATE 1 G: 1 TABLET ORAL at 18:00

## 2023-07-23 RX ADMIN — PAROXETINE HYDROCHLORIDE 60 MG: 20 TABLET, FILM COATED ORAL at 17:59

## 2023-07-23 RX ADMIN — CEFTRIAXONE SODIUM 2000 MG: 10 INJECTION, POWDER, FOR SOLUTION INTRAVENOUS at 10:04

## 2023-07-23 RX ADMIN — FERROUS SULFATE TAB 325 MG (65 MG ELEMENTAL FE) 325 MG: 325 (65 FE) TAB at 17:59

## 2023-07-23 RX ADMIN — MECLIZINE HYDROCHLORIDE 25 MG: 25 TABLET ORAL at 09:45

## 2023-07-23 RX ADMIN — SODIUM CHLORIDE, SODIUM GLUCONATE, SODIUM ACETATE, POTASSIUM CHLORIDE, MAGNESIUM CHLORIDE, SODIUM PHOSPHATE, DIBASIC, AND POTASSIUM PHOSPHATE 1000 ML: .53; .5; .37; .037; .03; .012; .00082 INJECTION, SOLUTION INTRAVENOUS at 16:41

## 2023-07-23 RX ADMIN — ONDANSETRON 4 MG: 2 INJECTION INTRAMUSCULAR; INTRAVENOUS at 09:02

## 2023-07-23 RX ADMIN — AZITHROMYCIN MONOHYDRATE 500 MG: 500 INJECTION, POWDER, LYOPHILIZED, FOR SOLUTION INTRAVENOUS at 10:38

## 2023-07-23 RX ADMIN — PANTOPRAZOLE SODIUM 40 MG: 40 TABLET, DELAYED RELEASE ORAL at 20:54

## 2023-07-23 RX ADMIN — GUAIFENESIN 600 MG: 600 TABLET, EXTENDED RELEASE ORAL at 17:59

## 2023-07-23 RX ADMIN — SODIUM CHLORIDE 1000 ML: 0.9 INJECTION, SOLUTION INTRAVENOUS at 08:51
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
## 2023-07-23 NOTE — ASSESSMENT & PLAN NOTE
Patient presented with cough, dyspnea, nausea, vomiting started yesterday and progressively worsening. In ER, noted to be hypoxic, tachycardic and tachypneic with productive cough. No Leukocytosis. On exam, rhonchi and mild wheezing heard. Chest x-ray consistent with right lower lobe pneumonia  Elevated procalcitonin noted  Start patient on broad-spectrum antibiotic with IV Rocephin and azithromycin.   Obtain urinary Legionella/strep, sputum culture, blood culture

## 2023-07-23 NOTE — ED ATTENDING ATTESTATION
7/23/2023  IHenrietta Cap, MD, saw and evaluated the patient. I have discussed the patient with the resident/non-physician practitioner and agree with the resident's/non-physician practitioner's findings, Plan of Care, and MDM as documented in the resident's/non-physician practitioner's note, except where noted. All available labs and Radiology studies were reviewed. I was present for key portions of any procedure(s) performed by the resident/non-physician practitioner and I was immediately available to provide assistance. At this point I agree with the current assessment done in the Emergency Department. I have conducted an independent evaluation of this patient a history and physical is as follows:    ED Course         Patient is a 60-year-old female with history of schizoaffective disorder recent head trauma history of vertigo presents with dizziness lightheadedness nausea vomiting. Patient states she had a recent trauma while a box because when a clothing rack struck her head causing her to have dizziness and headaches. She was seen and evaluated on review had negative CAT scan imaging at that time was discharged home with possible concussion. Patient notes that dizziness worsen she fell down flight of stairs 3 presented for evaluation of symptoms had a second CAT scan at that time of her head which was negative. Patient states symptoms began yesterday gradually worsened throughout the night including nausea and vomiting. Shortness of breath, productive cough. On arrival, vital signs reviewed patient noted to be hypoxic 86% placed on nasal cannula with improvement tachypneic, Wet sounding cough. Tachycardic,    Heart: Tachycardic, no murmurs Lungs: poor effort. Lungs: poor effort. Abdomen: Soft, nontender, nondistended, normal bowel sounds. Extremities: No edema. Neuro: Cranial nerves grossly intact. Strength 5 out of 5 bilaterally. Normal speech. Gait not assessed due to dizziness nausea vomiting. No limb or truncal ataxia    Impression: Dizziness, nausea vomiting, dyspnea, hypoxia  Differential diagnosis: ACS MI arrhythmia, pneumonia, pneumothorax, aspiration pneumonia, vertigo, postconcussive syndrome, gastritis, gastroenteritis, pancreatitis    Titrate oxygen to maintain oxygen saturation greater 92%. Check ECG chest x-ray CBC CMP lipase serial troponins lactic acid Pro-Armando blood cultures chest x-ray. Give trial of antiemetics and meclizine for dizziness. ECG independently interpreted by me sinus tachycardia biatrial enlargement. No acute ST-T changes. Impression abnormal ECG    Labs reviewed: CBC with differential remarkable for left shift. CMP remarkable for hyponatremia hyperkalemia elevated AST. Lipase within normal limits. Troponin x0 12 will trend. Chest x-ray independently interpreted by me evidence of right lower lobe airspace disease concerning for pneumonia    Decision regarding hospitalization: Patient presents with persistent dizziness nausea and vomiting evidence of acute hypoxic respiratory failure and findings consistent with possible pneumonia.     Will treat with IV antibiotics for presumed CAP titrate oxygen to medicine service        Critical Care Time  CriticalCare Time    Date/Time: 7/23/2023 11:15 AM    Performed by: Aleksandra Rangel MD  Authorized by: Aleksandra Rangel MD    Critical care provider statement:     Critical care time (minutes):  32    Critical care time was exclusive of:  Separately billable procedures and treating other patients and teaching time    Critical care was necessary to treat or prevent imminent or life-threatening deterioration of the following conditions:  Respiratory failure    Critical care was time spent personally by me on the following activities:  Obtaining history from patient or surrogate, development of treatment plan with patient or surrogate, evaluation of patient's response to treatment, examination of patient, ordering and performing treatments and interventions, ordering and review of laboratory studies, ordering and review of radiographic studies and re-evaluation of patient's condition    I assumed direction of critical care for this patient from another provider in my specialty: no

## 2023-07-23 NOTE — RESPIRATORY THERAPY NOTE
07/23/23 1815   Respiratory Protocol   Protocol Initiated? Yes   Protocol Selection Respiratory   Language Barrier? No   Medical & Social History Reviewed? Yes   Diagnostic Studies Reviewed? Yes   Physical Assessment Performed? Yes   Respiratory Plan Discontinue Protocol; No distress/Pulmonary history   Respiratory Assessment   Assessment Type Assess only   General Appearance Alert; Awake   Respiratory Pattern Normal   Chest Assessment Chest expansion symmetrical   Bilateral Breath Sounds Clear;Diminished   Resp Comments No known pulmonary hx, no known home pulmonary meds. Bilateral BS clear/diminshed. Pt addmitted for pneumonia. No distress ntoed.  Will D/C protocol @ this time   O2 Device nc4   Additional Assessments   Pulse 88   Respirations 16   SpO2 98 %   Position Semi-Burns's

## 2023-07-23 NOTE — ASSESSMENT & PLAN NOTE
Patient presented with hypoxia at 86%, improved with supplemental nasal cannula oxygen.   Secondary to pneumonia  Wean off as able to

## 2023-07-23 NOTE — H&P
[facility] [facility]  H&P  Name: Yin Buchanan 58 y.o. female I MRN: [id number]  Unit/Bed#: ED 24 I Date of Admission: 7/23/2023   Date of Service: 7/23/2023 I Hospital Day: 0      Assessment/Plan   * RLL pneumonia  Assessment & Plan  Patient presented with cough, dyspnea, nausea, vomiting started yesterday and progressively worsening. In ER, noted to be hypoxic, tachycardic and tachypneic with productive cough. No Leukocytosis. On exam, rhonchi and mild wheezing heard. Chest x-ray consistent with right lower lobe pneumonia  Elevated procalcitonin noted  Start patient on broad-spectrum antibiotic with IV Rocephin and azithromycin. Obtain urinary Legionella/strep, sputum culture, blood culture      Acute respiratory failure with hypoxia [facility])  Assessment & Plan  Patient presented with hypoxia at 86%, improved with supplemental nasal cannula oxygen. Secondary to pneumonia  Wean off as able to    Rheumatoid arthritis involving both feet, unspecified whether rheumatoid factor present [facility])  Assessment & Plan  Taking Celebrex daily, will hold all NSAIDs    Schreiber's esophagus  Assessment & Plan  Resume Protonix and Carafate    Hyponatremia  Assessment & Plan  Noted chronic hyponatremia likely secondary to SSRI? Currently remains at baseline    Essential hypertension  Assessment & Plan  /58  Resume amlodipine 5 mg with holding parameters    Schizoaffective disorder (HCC)  Assessment & Plan  Resume home medication ziprasidone, paroxetine, Seroquel  Ativan as needed    Acquired hypothyroidism  Assessment & Plan  Resume home levothyroxine       VTE Prophylaxis: Enoxaparin (Lovenox)  / sequential compression device   Code Status: full code     Discussion with family:     Anticipated Length of Stay:  Patient will be admitted on an Inpatient basis with an anticipated length of stay of  > 2 midnights.    Justification for Hospital Stay: pneumonia, hypoxia    Total Time for Visit, including Counseling / Coordination of Care: 60 minutes. Greater than 50% of this total time spent on direct patient counseling and coordination of care. Chief Complaint:   Dyspnea, cough    History of Present Illness:    Pelon Alvarado is a 58 y.o. female with PMH of schizoaffective disorder, HTN, vertigo, recent fall 1 week ago, presented to ER with complaint of dizziness, lightheadedness, nausea and vomiting. Patient symptoms began yesterday and has been worsening with nausea and vomiting. Now she also endorses to shortness of breath and productive cough. Upon arrival to ER, patient noted to be hypoxic at 86%, tachycardia, cough requiring supplemental oxygen via nasal cannula. No leukocytosis noted, normal troponin noted. Mildly elevated procalcitonin at 0.4. EKG reveals sinus tachycardia. Chest x-ray consistent with right lower lobe infiltrates, concerning for pneumonia. Patient will be admitted for acute respiratory failure secondary to pneumonia. Review of Systems:    Review of Systems   Constitutional: Negative for chills and fever. HENT: Negative for ear pain and sore throat. Eyes: Negative for pain and visual disturbance. Respiratory: Positive for cough and shortness of breath. Cardiovascular: Negative for chest pain and palpitations. Gastrointestinal: Positive for nausea and vomiting. Negative for abdominal pain. Genitourinary: Negative for dysuria and hematuria. Musculoskeletal: Negative for arthralgias and back pain. Skin: Negative for color change and rash. Neurological: Positive for dizziness. Negative for seizures and syncope. All other systems reviewed and are negative.       Past Medical and Surgical History:     Past Medical History:   Diagnosis Date   • Anemia    • Anxiety    • Arthritis    • Back pain at L4-L5 level    • Schreiber esophagus    • Bulimia    • Colon polyp    • Disease of thyroid gland     hypothyroid   • GERD (gastroesophageal reflux disease)    • Hearing loss in left ear    • History of transfusion    • Hyperlipidemia    • Hypertension    • Hypothyroidism    • Leukopenia    • NMS (neuroleptic malignant syndrome) 01/03/2020   • Pneumonia    • RA (rheumatoid arthritis) ([address])     in feet   • Sciatica    • Seizure ([address])     due to medication mix up 8/2018 only one historically   • Skin spots, red     lower right arm - poss from cat- no s/s infection   • Synovial cyst of lumbar spine    • Vertigo    • Wears dentures     full set   • Weight gain        Past Surgical History:   Procedure Laterality Date   • BONE MARROW BIOPSY     • BREAST SURGERY      enlargement with implants   • CLOSED REDUCTION DISTAL FEMUR FRACTURE     • COLONOSCOPY     • COSMETIC SURGERY     • DENTAL SURGERY     • HIP ARTHROPLASTY Right 01/02/2020    Procedure: REVISION TOTAL HIP ARTHROPLASTY WITH REPAIR OF PARIPROSTHETIC FRACTURE - POSTERIOR APPROACH;  Surgeon: Fernandez Ziegler MD;  Location: [facility] MAIN [facility];  Service: Orthopedics   • MA AMPUTATION METATARSAL W/TOE SINGLE Left 04/07/2023    Procedure: AMPUTATION TOE 4th;  Surgeon: Steph Medellin DPM;  Location: [address] [place] [address] MAIN OR;  Service: Podiatry   • MA ARTHRP ACETBLR/PROX FEM PROSTC AGRFT/ALGRFT Right 12/11/2019    Procedure: ARTHROPLASTY HIP TOTAL ANTERIOR;  Surgeon: Fernandez Ziegler MD;  Location: [facility] MA[facility];  Service: Orthopedics   • MA ESOPHAGOGASTRODUODENOSCOPY TRANSORAL DIAGNOSTIC N/A 05/11/2021    Procedure: ESOPHAGOGASTRODUODENOSCOPY (EGD); Surgeon: Shazia Anderson MD;  Location: [facility] MAIN [facility];  Service: General   • MA LAPS RPR PARAESPHGL HRNA INCL FUNDPLSTY [address] N/A 05/11/2021    Procedure: REPAIR HERNIA PARAESOPHAGEAL  LAPAROSCOPIC;  Surgeon:  Shazia Anderson MD;  Location: [facility];  Service: General   • MA UNLISTED PROCEDURE ESOPHAGUS N/A 05/11/2021    Procedure: FUNDOPLICATION TRANSORAL INCISIONLESS;  Surgeon: Carolyn Browning MD;  Location: [facility];  Service: Gastroenterology   • RHINOPLASTY     • SINUS SURGERY     • TOE AMPUTATION Left     2023 4th toe   • TRANSORAL INCISIONLESS FUNDOPLICATION (TIF)  47/46/1044       Meds/Allergies:    Prior to Admission medications    Medication Sig Start Date End Date Taking? Authorizing Provider   amLODIPine (NORVASC) 5 mg tablet TAKE 1 TABLET (5 MG TOTAL) BY MOUTH DAILY.  1/24/23   Brisa Aranda MD   celecoxib (CeleBREX) 200 mg capsule Take 1 capsule (200 mg total) by mouth daily 7/21/23   Brisa Aranda MD   ferrous sulfate 325 (65 Fe) mg tablet Take 1 tablet (325 mg total) by mouth 2 (two) times a day with meals 4/29/23   Fidelia Leach MD   folic acid (FOLVITE) 295 mcg tablet Take 1 tablet (400 mcg total) by mouth daily 6/2/23   ABENA Castorena   levothyroxine 25 mcg tablet TAKE 1 TABLET BY MOUTH EVERY DAY 4/2/23   Brisa Aranda MD   lidocaine (Lidoderm) 5 % Apply 1 patch topically over 12 hours daily Remove & Discard patch within 12 hours or as directed by MD 7/18/23   Jaspreet Foster MD   LORazepam (ATIVAN) 2 mg tablet Take 1 tablet (2 mg total) by mouth every 6 (six) hours as needed for anxiety 6/30/23   Brias Aranda MD   ondansetron [facility]) 4 mg tablet Take 1 tablet (4 mg total) by mouth every 8 (eight) hours as needed for nausea or vomiting 7/21/23   Brisa Aranda MD   pantoprazole (PROTONIX) 40 mg tablet Take 1 tablet (40 mg total) by mouth 2 (two) times a day 1/24/23   Brisa Aranda MD   PARoxetine (PAXIL) 30 mg tablet Take 2 tablets (60 mg total) by mouth in the morning 5/22/23   Brisa Aranda MD   QUEtiapine (SEROquel XR) 400 mg 24 hr tablet Take 1 tablet (400 mg total) by mouth daily at bedtime 5/26/23   Brisa Aranda MD   sucralfate (CARAFATE) 1 g tablet TAKE 1 TABLET (1 G TOTAL) BY MOUTH 3 (THREE) TIMES A DAY WITH MEALS 3/25/23   Brisa Aranda MD   triamcinolone (KENALOG) 0.1 % cream APPLY TOPICALLY 2 TIMES A DAY AS NEEDED FOR RASH. 5/24/23   Brisa Aranda MD   vitamin B-12 (VITAMIN B-12) 1,000 mcg tablet Take 1 tablet (1,000 mcg total) by mouth daily 6/2/23 ABENA Castorena   ziprasidone (GEODON) 80 mg capsule TAKE 1 CAPSULE BY MOUTH EVERY DAY 5/24/23   Arpit Valadez MD     I have reviewed home medications using allscripts. Allergies: Allergies   Allergen Reactions   • Risperdal [Risperidone] Shortness Of Breath     Rapid heart beat, SOB   • Zyprexa [Olanzapine] Shortness Of Breath     Rapid heartbeat   • Latex Rash     Band aids, adhesives wears normal underwear, can eat bananas  USE PAPER TAPE       Social History:     Marital Status: Single      Substance Use History:   Social History     Substance and Sexual Activity   Alcohol Use Not Currently     Social History     Tobacco Use   Smoking Status Never   Smokeless Tobacco Never     Social History     Substance and Sexual Activity   Drug Use Not Currently       Family History:    Family History   Problem Relation Age of Onset   • Uterine cancer Mother    • Esophageal cancer Father    • Colon cancer Maternal Grandmother        Physical Exam:     Vitals:   Blood Pressure: 115/58 (07/23/23 1115)  Pulse: (!) 114 (07/23/23 1115)  Temperature: 99.8 °F (37.7 °C) (07/23/23 0814)  Temp Source: Oral (07/23/23 0814)  Respirations: (!) 25 (07/23/23 1115)  SpO2: 95 % (07/23/23 1115)    Physical Exam  Constitutional:       Appearance: Normal appearance. She is well-developed and normal weight. HENT:      Head: Normocephalic and atraumatic. Right Ear: External ear normal.      Left Ear: External ear normal.      Mouth/Throat:      Comments: Missing teeth  Cardiovascular:      Rate and Rhythm: Regular rhythm. Tachycardia present. Pulses: Normal pulses. Heart sounds: Normal heart sounds. Pulmonary:      Effort: Pulmonary effort is normal. No respiratory distress. Breath sounds: Rhonchi present. Comments: Bilateral rhonchi more on right  Abdominal:      General: Abdomen is flat. There is no distension. Palpations: Abdomen is soft. Tenderness: There is no abdominal tenderness. Musculoskeletal:         General: No swelling or deformity. Cervical back: Normal range of motion. Right lower leg: No edema. Left lower leg: No edema. Skin:     General: Skin is warm and dry. Neurological:      General: No focal deficit present. Mental Status: She is alert and oriented to person, place, and time. Psychiatric:         Mood and Affect: Mood normal.          Additional Data:     Lab Results: I have personally reviewed pertinent reports. Results from last 7 days   Lab Units 07/23/23  0852   WBC Thousand/uL 4.47   HEMOGLOBIN g/dL 12.8   HEMATOCRIT % 39.0   PLATELETS Thousands/uL 194   NEUTROS PCT % 89*   LYMPHS PCT % 4*   MONOS PCT % 6   EOS PCT % 1     Results from last 7 days   Lab Units 07/23/23  0852   SODIUM mmol/L 130*   POTASSIUM mmol/L 5.4*   CHLORIDE mmol/L 98   CO2 mmol/L 26   BUN mg/dL 13   CREATININE mg/dL 0.88   ANION GAP mmol/L 6   CALCIUM mg/dL 11.5*   ALBUMIN g/dL 3.6   TOTAL BILIRUBIN mg/dL 0.25   ALK PHOS U/L 84   ALT U/L 35   AST U/L 46*   GLUCOSE RANDOM mg/dL 95                 Results from last 7 days   Lab Units 07/23/23  1025   PROCALCITONIN ng/ml 0.41*       Imaging: I have personally reviewed pertinent reports. XR chest 2 views    (Results Pending)       EKG, Pathology, and Other Studies Reviewed on Admission:   · EKG: sinus tachy    Allscripts / Epic Records Reviewed: Yes     ** Please Note: This note has been constructed using a voice recognition system.  **

## 2023-07-24 PROBLEM — A41.9 SEPSIS WITHOUT ACUTE ORGAN DYSFUNCTION (HCC): Status: ACTIVE | Noted: 2023-07-24

## 2023-07-24 LAB
L PNEUMO1 AG UR QL IA.RAPID: NEGATIVE
PROCALCITONIN SERPL-MCNC: 1.01 NG/ML
S PNEUM AG UR QL: NEGATIVE

## 2023-07-24 PROCEDURE — 99232 SBSQ HOSP IP/OBS MODERATE 35: CPT | Performed by: PHYSICIAN ASSISTANT

## 2023-07-24 PROCEDURE — 84145 PROCALCITONIN (PCT): CPT | Performed by: FAMILY MEDICINE

## 2023-07-24 RX ORDER — PROCHLORPERAZINE MALEATE 5 MG/1
5 TABLET ORAL EVERY 6 HOURS PRN
Status: DISCONTINUED | OUTPATIENT
Start: 2023-07-24 | End: 2023-07-25 | Stop reason: HOSPADM

## 2023-07-24 RX ORDER — FAMOTIDINE 20 MG/1
20 TABLET, FILM COATED ORAL ONCE
Status: COMPLETED | OUTPATIENT
Start: 2023-07-24 | End: 2023-07-24

## 2023-07-24 RX ADMIN — GUAIFENESIN 600 MG: 600 TABLET, EXTENDED RELEASE ORAL at 08:46

## 2023-07-24 RX ADMIN — LEVOTHYROXINE SODIUM 25 MCG: 25 TABLET ORAL at 06:00

## 2023-07-24 RX ADMIN — PROCHLORPERAZINE MALEATE 5 MG: 5 TABLET ORAL at 10:09

## 2023-07-24 RX ADMIN — GUAIFENESIN 600 MG: 600 TABLET, EXTENDED RELEASE ORAL at 17:42

## 2023-07-24 RX ADMIN — SUCRALFATE 1 G: 1 TABLET ORAL at 08:46

## 2023-07-24 RX ADMIN — QUETIAPINE FUMARATE 400 MG: 200 TABLET, EXTENDED RELEASE ORAL at 21:43

## 2023-07-24 RX ADMIN — FOLIC ACID TAB 400 MCG 400 MCG: 400 TAB at 08:46

## 2023-07-24 RX ADMIN — PAROXETINE HYDROCHLORIDE 60 MG: 20 TABLET, FILM COATED ORAL at 08:46

## 2023-07-24 RX ADMIN — ENOXAPARIN SODIUM 40 MG: 40 INJECTION SUBCUTANEOUS at 08:47

## 2023-07-24 RX ADMIN — SUCRALFATE 1 G: 1 TABLET ORAL at 11:15

## 2023-07-24 RX ADMIN — AMLODIPINE BESYLATE 5 MG: 5 TABLET ORAL at 08:46

## 2023-07-24 RX ADMIN — FAMOTIDINE 20 MG: 20 TABLET, FILM COATED ORAL at 10:08

## 2023-07-24 RX ADMIN — CYANOCOBALAMIN TAB 500 MCG 1000 MCG: 500 TAB at 08:46

## 2023-07-24 RX ADMIN — ONDANSETRON 4 MG: 2 INJECTION INTRAMUSCULAR; INTRAVENOUS at 07:51

## 2023-07-24 RX ADMIN — PANTOPRAZOLE SODIUM 40 MG: 40 TABLET, DELAYED RELEASE ORAL at 21:43

## 2023-07-24 RX ADMIN — ZIPRASIDONE HYDROCHLORIDE 80 MG: 40 CAPSULE ORAL at 08:46

## 2023-07-24 RX ADMIN — SUCRALFATE 1 G: 1 TABLET ORAL at 17:42

## 2023-07-24 RX ADMIN — CEFTRIAXONE SODIUM 1000 MG: 10 INJECTION, POWDER, FOR SOLUTION INTRAVENOUS at 11:14

## 2023-07-24 RX ADMIN — PANTOPRAZOLE SODIUM 40 MG: 40 TABLET, DELAYED RELEASE ORAL at 08:46

## 2023-07-24 RX ADMIN — FERROUS SULFATE TAB 325 MG (65 MG ELEMENTAL FE) 325 MG: 325 (65 FE) TAB at 08:46

## 2023-07-24 RX ADMIN — FERROUS SULFATE TAB 325 MG (65 MG ELEMENTAL FE) 325 MG: 325 (65 FE) TAB at 17:42

## 2023-07-24 NOTE — PLAN OF CARE
Problem: Potential for Falls  Goal: Patient will remain free of falls  Description: INTERVENTIONS:  - Educate patient/family on patient safety including physical limitations  - Instruct patient to call for assistance with activity   - Consult OT/PT to assist with strengthening/mobility   - Keep Call bell within reach  - Keep bed low and locked with side rails adjusted as appropriate  - Keep care items and personal belongings within reach  - Initiate and maintain comfort rounds  - Make Fall Risk Sign visible to staff  - Offer Toileting every  Hours, in advance of need  - Initiate/Maintain alarm  - Obtain necessary fall risk management equipment:   - Apply yellow socks and bracelet for high fall risk patients  - Consider moving patient to room near nurses station  Outcome: Progressing     Problem: Potential for Falls  Goal: Patient will remain free of falls  Description: INTERVENTIONS:  - Educate patient/family on patient safety including physical limitations  - Instruct patient to call for assistance with activity   - Consult OT/PT to assist with strengthening/mobility   - Keep Call bell within reach  - Keep bed low and locked with side rails adjusted as appropriate  - Keep care items and personal belongings within reach  - Initiate and maintain comfort rounds  - Make Fall Risk Sign visible to staff  - Offer Toileting every  Hours, in advance of need  - Initiate/Maintain alarm  - Obtain necessary fall risk management equipment:   - Apply yellow socks and bracelet for high fall risk patients  - Consider moving patient to room near nurses station  7/24/2023 [id number] by Ralph Mckeon RN  Outcome: Progressing  7/24/2023 0632 by Ralph Mckeon, RN  Outcome: Progressing     Problem: PAIN - ADULT  Goal: Verbalizes/displays adequate comfort level or baseline comfort level  Description: Interventions:  - Encourage patient to monitor pain and request assistance  - Assess pain using appropriate pain scale  - Administer analgesics based on type and severity of pain and evaluate response  - Implement non-pharmacological measures as appropriate and evaluate response  - Consider cultural and social influences on pain and pain management  - Notify physician/advanced practitioner if interventions unsuccessful or patient reports new pain  Outcome: Progressing     Problem: INFECTION - ADULT  Goal: Absence or prevention of progression during hospitalization  Description: INTERVENTIONS:  - Assess and monitor for signs and symptoms of infection  - Monitor lab/diagnostic results  - Monitor all insertion sites, i.e. indwelling lines, tubes, and drains  - Monitor endotracheal if appropriate and nasal secretions for changes in amount and color  - [place] appropriate cooling/warming therapies per order  - Administer medications as ordered  - Instruct and encourage patient and family to use good hand hygiene technique  - Identify and instruct in appropriate isolation precautions for identified infection/condition  Outcome: Progressing  Goal: Absence of fever/infection during neutropenic period  Description: INTERVENTIONS:  - Monitor WBC    Outcome: Progressing     Problem: SAFETY ADULT  Goal: Patient will remain free of falls  Description: INTERVENTIONS:  - Educate patient/family on patient safety including physical limitations  - Instruct patient to call for assistance with activity   - Consult OT/PT to assist with strengthening/mobility   - Keep Call bell within reach  - Keep bed low and locked with side rails adjusted as appropriate  - Keep care items and personal belongings within reach  - Initiate and maintain comfort rounds  - Make Fall Risk Sign visible to staff  - Offer Toileting every  Hours, in advance of need  - Initiate/Maintain alarm  - Obtain necessary fall risk management equipment:   - Apply yellow socks and bracelet for high fall risk patients  - Consider moving patient to room near nurses station  7/24/2023 [id number] by Betty Horn, RN  Outcome: Progressing  7/24/2023 0632 by Marianne Washington RN  Outcome: Progressing  Goal: Maintain or return to baseline ADL function  Description: INTERVENTIONS:  -  Assess patient's ability to carry out ADLs; assess patient's baseline for ADL function and identify physical deficits which impact ability to perform ADLs (bathing, care of mouth/teeth, toileting, grooming, dressing, etc.)  - Assess/evaluate cause of self-care deficits   - Assess range of motion  - Assess patient's mobility; develop plan if impaired  - Assess patient's need for assistive devices and provide as appropriate  - Encourage maximum independence but intervene and supervise when necessary  - Involve family in performance of ADLs  - Assess for home care needs following discharge   - Consider OT consult to assist with ADL evaluation and planning for discharge  - Provide patient education as appropriate  Outcome: Progressing  Goal: Maintains/Returns to pre admission functional level  Description: INTERVENTIONS:  - Perform BMAT or MOVE assessment daily.   - Set and communicate daily mobility goal to care team and patient/family/caregiver. - Collaborate with rehabilitation services on mobility goals if consulted  - Perform Range of Motion times a day. - Reposition patient every  hours.   - Dangle patient  times a day  - Stand patient  times a day  - Ambulate patient  times a day  - Out of bed to chair  times a day   - Out of bed for meals times a day  - Out of bed for toileting  - Record patient progress and toleration of activity level   Outcome: Progressing     Problem: DISCHARGE PLANNING  Goal: Discharge to home or other facility with appropriate resources  Description: INTERVENTIONS:  - Identify barriers to discharge w/patient and caregiver  - Arrange for needed discharge resources and transportation as appropriate  - Identify discharge learning needs (meds, wound care, etc.)  - Arrange for interpretive services to assist at discharge as needed  - Refer to Case Management Department for coordinating discharge planning if the patient needs post-hospital services based on physician/advanced practitioner order or complex needs related to functional status, cognitive ability, or social support system  Outcome: Progressing     Problem: Knowledge Deficit  Goal: Patient/family/caregiver demonstrates understanding of disease process, treatment plan, medications, and discharge instructions  Description: Complete learning assessment and assess knowledge base.   Interventions:  - Provide teaching at level of understanding  - Provide teaching via preferred learning methods  Outcome: Progressing

## 2023-07-24 NOTE — PLAN OF CARE
Problem: INFECTION - ADULT  Goal: Absence or prevention of progression during hospitalization  Description: INTERVENTIONS:  - Assess and monitor for signs and symptoms of infection  - Monitor lab/diagnostic results  - Monitor all insertion sites, i.e. indwelling lines, tubes, and drains  - Monitor endotracheal if appropriate and nasal secretions for changes in amount and color  - [place] appropriate cooling/warming therapies per order  - Administer medications as ordered  - Instruct and encourage patient and family to use good hand hygiene technique  - Identify and instruct in appropriate isolation precautions for identified infection/condition  Outcome: Progressing

## 2023-07-24 NOTE — PROGRESS NOTES
[facility]  Progress Note  Name: Paty Narayanan I  MRN: [id number]  Unit/Bed#: -99 I Date of Admission: 7/23/2023   Date of Service: 7/24/2023 I Hospital Day: 1    Assessment/Plan   Sepsis without acute organ dysfunction [facility])  Assessment & Plan  · As evidenced by tachycardia, tachypnea, elevated lactate  · S/P IVF bolus  · Clinically improving    * RLL pneumonia  Assessment & Plan  Patient presented with cough, dyspnea, nausea, vomiting started 7/22 and progressively worsening. In ER, noted to be hypoxic, tachycardic and tachypneic with productive cough. No Leukocytosis. On exam, rhonchi and mild wheezing heard. Chest x-ray consistent with right lower lobe pneumonia  Elevated procalcitonin noted  Started patient on broad-spectrum antibiotic with IV Rocephin and azithromycin. D/C azithromycin  urinary Legionella/strep - negative, blood culture - negative to date      Acute respiratory failure with hypoxia [facility])  Assessment & Plan  Patient presented with hypoxia at 86%, improved with supplemental nasal cannula oxygen. Secondary to pneumonia  Try on room air today    Schreiber's esophagus  Assessment & Plan  Resumed Protonix and Carafate  Patient complaining of lot of epigastric discomfort and nausea today. Added dose of Pepcid today. Added further antiemetics. Hyponatremia  Assessment & Plan  Noted chronic hyponatremia likely secondary to SSRI?    Currently remains at baseline    Rheumatoid arthritis involving both feet, unspecified whether rheumatoid factor present [facility])  Assessment & Plan  Taking Celebrex daily, will hold all NSAIDs    Schizoaffective disorder (HCC)  Assessment & Plan  Resume home medication ziprasidone, paroxetine, Seroquel  Ativan as needed    Essential hypertension  Assessment & Plan  Resume amlodipine 5 mg with holding parameters           VTE Pharmacologic Prophylaxis:   Moderate Risk (Score 3-4) - Pharmacological DVT Prophylaxis Ordered: enoxaparin (Lovenox). Patient Centered Rounds: I performed bedside rounds with nursing staff today. Discussions with Specialists or Other Care Team Provider: case management    Education and Discussions with Family / Patient: Attempted to update  (mother) via phone. Left voicemail. Total Time Spent on Date of Encounter in care of patient: 35 minutes This time was spent on one or more of the following: performing physical exam; counseling and coordination of care; obtaining or reviewing history; documenting in the medical record; reviewing/ordering tests, medications or procedures; communicating with other healthcare professionals and discussing with patient's family/caregivers. Current Length of Stay: 1 day(s)  Current Patient Status: Inpatient   Certification Statement: The patient will continue to require additional inpatient hospital stay due to IV antibiotics  Discharge Plan: Anticipate discharge tomorrow to home. Code Status: Level 1 - Full Code    Subjective:   Patient complaining of nausea and epigastric discomfort this morning. Patient states she has not vomited, but feels like dry heaving. Patient states her breathing feels a little heavy today as well. Objective:     Vitals:   Temp (24hrs), Av.5 °F (36.9 °C), Min:98.1 °F (36.7 °C), Max:98.8 °F (37.1 °C)    Temp:  [98.1 °F (36.7 °C)-98.8 °F (37.1 °C)] 98.6 °F (37 °C)  HR:  [83-98] 92  Resp:  [16-33] 20  BP: (115-137)/(68-78) 137/78  SpO2:  [94 %-98 %] 94 %  Body mass index is 33.55 kg/m². Input and Output Summary (last 24 hours):   No intake or output data in the 24 hours ending 23 1406    Physical Exam:   Physical Exam  Constitutional:       Appearance: Normal appearance. Cardiovascular:      Rate and Rhythm: Normal rate and regular rhythm. Heart sounds: No murmur heard. Pulmonary:      Effort: Pulmonary effort is normal.      Breath sounds: Normal breath sounds.    Abdominal:      General: Bowel sounds are normal. There is no distension. Palpations: Abdomen is soft. Tenderness: There is abdominal tenderness (mild epigastric tenderness to palpation). Skin:     General: Skin is warm and dry. Neurological:      General: No focal deficit present. Mental Status: She is alert and oriented to person, place, and time. Psychiatric:         Mood and Affect: Mood normal.          Additional Data:     Labs:  Results from last 7 days   Lab Units 07/23/23  0852   WBC Thousand/uL 4.47   HEMOGLOBIN g/dL 12.8   HEMATOCRIT % 39.0   PLATELETS Thousands/uL 194   NEUTROS PCT % 89*   LYMPHS PCT % 4*   MONOS PCT % 6   EOS PCT % 1     Results from last 7 days   Lab Units 07/23/23  0852   SODIUM mmol/L 130*   POTASSIUM mmol/L 5.4*   CHLORIDE mmol/L 98   CO2 mmol/L 26   BUN mg/dL 13   CREATININE mg/dL 0.88   ANION GAP mmol/L 6   CALCIUM mg/dL 11.5*   ALBUMIN g/dL 3.6   TOTAL BILIRUBIN mg/dL 0.25   ALK PHOS U/L 84   ALT U/L 35   AST U/L 46*   GLUCOSE RANDOM mg/dL 95                 Results from last 7 days   Lab Units 07/24/23  0506 07/23/23  1410 07/23/23  1025 07/23/23  1006   LACTIC ACID mmol/L  --  2.4*  --  2.6*   PROCALCITONIN ng/ml 1.01*  --  0.41*  --        Lines/Drains:  Invasive Devices     Peripheral Intravenous Line  Duration           Peripheral IV 07/23/23 Distal;Dorsal (posterior); Right Forearm 1 day                      Imaging: Reviewed radiology reports from this admission including: xray(s)    Recent Cultures (last 7 days):   Results from last 7 days   Lab Units 07/23/23  1832 07/23/23  1025   BLOOD CULTURE   --  No Growth at 24 hrs. No Growth at 24 hrs.    LEGIONELLA URINARY ANTIGEN  Negative  --        Last 24 Hours Medication List:   Current Facility-Administered Medications   Medication Dose Route Frequency Provider Last Rate   • acetaminophen  650 mg Oral Q6H PRN Jocelyn Nevarez MD     • amLODIPine  5 mg Oral Daily Jocelyn Nevarez MD     • cefTRIAXone  1,000 mg Intravenous Q24H Star MD Derek 1,000 mg (07/24/23 1114)   • vitamin B-12  1,000 mcg Oral Daily Kathy Romero MD     • enoxaparin  40 mg Subcutaneous Daily Kathy Romero MD     • ferrous sulfate  325 mg Oral BID With Moshe Encarnacion MD     • folic acid  494 mcg Oral Daily Kathy Romero MD     • guaiFENesin  600 mg Oral BID Kathy Romero MD     • levothyroxine  25 mcg Oral Early Morning Kathy Romero MD     • LORazepam  2 mg Oral Q6H PRN Star Reed MD     • ondansetron  4 mg Intravenous Q6H PRN Kathy Romero MD     • pantoprazole  40 mg Oral BID Kathy Romero MD     • PARoxetine  60 mg Oral Daily Kathy Romero MD     • prochlorperazine  5 mg Oral Q6H PRN Ahsan Martin PA-C     • QUEtiapine  400 mg Oral HS Kathy Romero MD     • sucralfate  1 g Oral TID With Moshe Encarnacion MD     • ziprasidone  80 mg Oral Daily Kathy Romero MD          Today, Patient Was Seen By: Ahsan Martin PA-C    **Please Note: This note may have been constructed using a voice recognition system. **

## 2023-07-24 NOTE — ASSESSMENT & PLAN NOTE
Patient presented with cough, dyspnea, nausea, vomiting started 7/22 and progressively worsening. In ER, noted to be hypoxic, tachycardic and tachypneic with productive cough. No Leukocytosis. On exam, rhonchi and mild wheezing heard. Chest x-ray consistent with right lower lobe pneumonia  Elevated procalcitonin noted  Started patient on broad-spectrum antibiotic with IV Rocephin and azithromycin.  D/C azithromycin  urinary Legionella/strep - negative, blood culture - negative to date  Discharge on oral cefdinir to complete antibiotic course  Clinically much improved
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
## 2023-07-24 NOTE — ASSESSMENT & PLAN NOTE
Patient presented with hypoxia at 86%, improved with supplemental nasal cannula oxygen.   Secondary to pneumonia  Stable on room air

## 2023-07-24 NOTE — ASSESSMENT & PLAN NOTE
Resumed Protonix and Carafate  Patient complaining of lot of epigastric discomfort and nausea today. Added dose of Pepcid today. Added further antiemetics.

## 2023-07-24 NOTE — ASSESSMENT & PLAN NOTE
Resumed Protonix and Carafate  Patient complaining of lot of epigastric discomfort and nausea 7/24. Added dose of Pepcid. Added further antiemetics.   Now resolved

## 2023-07-24 NOTE — ASSESSMENT & PLAN NOTE
Patient presented with hypoxia at 86%, improved with supplemental nasal cannula oxygen.   Secondary to pneumonia  Try on room air today

## 2023-07-24 NOTE — ASSESSMENT & PLAN NOTE
Patient presented with cough, dyspnea, nausea, vomiting started 7/22 and progressively worsening. In ER, noted to be hypoxic, tachycardic and tachypneic with productive cough. No Leukocytosis. On exam, rhonchi and mild wheezing heard. Chest x-ray consistent with right lower lobe pneumonia  Elevated procalcitonin noted  Started patient on broad-spectrum antibiotic with IV Rocephin and azithromycin.  D/C azithromycin  urinary Legionella/strep - negative, blood culture - negative to date

## 2023-07-25 VITALS
BODY MASS INDEX: 33.75 KG/M2 | HEART RATE: 91 BPM | SYSTOLIC BLOOD PRESSURE: 125 MMHG | WEIGHT: 183.42 LBS | OXYGEN SATURATION: 96 % | DIASTOLIC BLOOD PRESSURE: 85 MMHG | TEMPERATURE: 97.9 F | RESPIRATION RATE: 16 BRPM | HEIGHT: 62 IN

## 2023-07-25 LAB
ANION GAP SERPL CALCULATED.3IONS-SCNC: 5 MMOL/L
BUN SERPL-MCNC: 8 MG/DL (ref 5–25)
CALCIUM SERPL-MCNC: 9.1 MG/DL (ref 8.3–10.1)
CHLORIDE SERPL-SCNC: 104 MMOL/L (ref 96–108)
CO2 SERPL-SCNC: 27 MMOL/L (ref 21–32)
CREAT SERPL-MCNC: 0.56 MG/DL (ref 0.6–1.3)
ERYTHROCYTE [DISTWIDTH] IN BLOOD BY AUTOMATED COUNT: 20.1 % (ref 11.6–15.1)
GFR SERPL CREATININE-BSD FRML MDRD: 100 ML/MIN/1.73SQ M
GLUCOSE SERPL-MCNC: 90 MG/DL (ref 65–140)
HCT VFR BLD AUTO: 34.6 % (ref 34.8–46.1)
HGB BLD-MCNC: 11.2 G/DL (ref 11.5–15.4)
MCH RBC QN AUTO: 27 PG (ref 26.8–34.3)
MCHC RBC AUTO-ENTMCNC: 32.4 G/DL (ref 31.4–37.4)
MCV RBC AUTO: 83 FL (ref 82–98)
PLATELET # BLD AUTO: 177 THOUSANDS/UL (ref 149–390)
PMV BLD AUTO: 8.3 FL (ref 8.9–12.7)
POTASSIUM SERPL-SCNC: 4.2 MMOL/L (ref 3.5–5.3)
PROCALCITONIN SERPL-MCNC: 0.55 NG/ML
RBC # BLD AUTO: 4.15 MILLION/UL (ref 3.81–5.12)
SODIUM SERPL-SCNC: 136 MMOL/L (ref 135–147)
WBC # BLD AUTO: 3.43 THOUSAND/UL (ref 4.31–10.16)

## 2023-07-25 PROCEDURE — 80048 BASIC METABOLIC PNL TOTAL CA: CPT | Performed by: PHYSICIAN ASSISTANT

## 2023-07-25 PROCEDURE — 85027 COMPLETE CBC AUTOMATED: CPT | Performed by: PHYSICIAN ASSISTANT

## 2023-07-25 PROCEDURE — 99239 HOSP IP/OBS DSCHRG MGMT >30: CPT | Performed by: PHYSICIAN ASSISTANT

## 2023-07-25 PROCEDURE — 84145 PROCALCITONIN (PCT): CPT | Performed by: PHYSICIAN ASSISTANT

## 2023-07-25 RX ORDER — CEFDINIR 300 MG/1
300 CAPSULE ORAL EVERY 12 HOURS SCHEDULED
Qty: 10 CAPSULE | Refills: 0 | Status: SHIPPED | OUTPATIENT
Start: 2023-07-26 | End: 2023-07-31

## 2023-07-25 RX ADMIN — PANTOPRAZOLE SODIUM 40 MG: 40 TABLET, DELAYED RELEASE ORAL at 09:08

## 2023-07-25 RX ADMIN — ZIPRASIDONE HYDROCHLORIDE 80 MG: 40 CAPSULE ORAL at 09:08

## 2023-07-25 RX ADMIN — AMLODIPINE BESYLATE 5 MG: 5 TABLET ORAL at 09:08

## 2023-07-25 RX ADMIN — FERROUS SULFATE TAB 325 MG (65 MG ELEMENTAL FE) 325 MG: 325 (65 FE) TAB at 09:08

## 2023-07-25 RX ADMIN — FOLIC ACID TAB 400 MCG 400 MCG: 400 TAB at 09:08

## 2023-07-25 RX ADMIN — ENOXAPARIN SODIUM 40 MG: 40 INJECTION SUBCUTANEOUS at 09:08

## 2023-07-25 RX ADMIN — CYANOCOBALAMIN TAB 500 MCG 1000 MCG: 500 TAB at 09:08

## 2023-07-25 RX ADMIN — PAROXETINE HYDROCHLORIDE 60 MG: 20 TABLET, FILM COATED ORAL at 09:08

## 2023-07-25 RX ADMIN — SUCRALFATE 1 G: 1 TABLET ORAL at 09:08

## 2023-07-25 RX ADMIN — CEFTRIAXONE SODIUM 1000 MG: 10 INJECTION, POWDER, FOR SOLUTION INTRAVENOUS at 09:12

## 2023-07-25 RX ADMIN — GUAIFENESIN 600 MG: 600 TABLET, EXTENDED RELEASE ORAL at 09:08

## 2023-07-25 RX ADMIN — LEVOTHYROXINE SODIUM 25 MCG: 25 TABLET ORAL at 05:40

## 2023-07-25 NOTE — CASE MANAGEMENT
Case Management Discharge Planning Note    Patient name Norma Sprain  Location /-20 MRN [id number]  : 1960 Date 2023       Current Admission Date: 2023  Current Admission Diagnosis:RLL pneumonia   Patient Active Problem List    Diagnosis Date Noted   • Sepsis without acute organ dysfunction ([address]) 2023   • RLL pneumonia 2023   • Acute respiratory failure with hypoxia ([address]) 2023   • Headache 2023   • Symptomatic anemia 2023   • Stress incontinence 2023   • Ulcer of toe, chronic, left, with unspecified severity ([address]) 2023   • Rheumatoid arthritis involving both feet, unspecified whether rheumatoid factor present ([address]) 2023   • Acute encephalopathy 2022   • Thrombocytosis 2022   • Abdominal pain 2022   • Obesity, Class I, BMI 30-34.9 2022   • Schreiber's esophagus 2022   • Microcytic anemia 2022   • Self neglect 2022   • Pruritus 2022   • Upper GI bleed 01/10/2022   • Nausea and vomiting 10/22/2021   • S/P tooth extraction 10/22/2021   • SIRS (systemic inflammatory response syndrome) ([address]) 2021   • Hyperlipidemia 2021   • Word finding difficulty 2021   • Hiatal hernia 2021   • Polyp of colon 2021   • Melena 2020   • Cellulitis of left upper extremity 2020   • Erosive esophagitis 2020   • Rash 2020   • Iron deficiency anemia 2020   • Multiple excoriations 2020   • Acute non-recurrent maxillary sinusitis 2020   • Dizziness 2020   • Fall at home 2020   • Esophagitis 2020   • Hyponatremia 2020   • Problem related to living arrangement 2020   • Hypokalemia    • NMS (neuroleptic malignant syndrome) 2020   • Preop exam for internal medicine 2019   • Severe iron deficiency anemia  2019   • Chronic bilateral low back pain without sciatica 2019   • Right hip pain 07/15/2019 • Essential hypertension 06/12/2019   • Dermal hypersensitivity reaction 11/08/2018   • Psychiatric disorder 08/21/2018   • Schizoaffective disorder ([address]) 08/16/2018   • Serotonin syndrome 08/13/2018   • Other fatigue 06/19/2018   • Spinal stenosis of lumbar region with neurogenic claudication 06/15/2018   • Lumbar spondylosis 06/15/2018   • T12 compression fracture ([address]) 06/15/2018   • Synovial cyst of lumbar facet joint 06/15/2018   • Localized osteoporosis with current pathological fracture with routine healing 05/25/2018   • Lumbar radiculopathy 03/19/2018   • DDD (degenerative disc disease), lumbar 03/19/2018   • Spondylolisthesis at L4-L5 level 03/19/2018   • Anxiety 10/31/2016   • Acquired hypothyroidism 10/31/2016   • Constipation 04/10/2014   • Bulimia nervosa in remission 03/19/2014      LOS (days): 2  Geometric Mean LOS (GMLOS) (days): 5.00  Days to GMLOS:3     OBJECTIVE:  Risk of Unplanned Readmission Score: 20.51         Current admission status: Inpatient   Preferred Pharmacy:     [facility], [address] [place] [id number]  Phone: [phone] Fax: [phone]    Primary Care Provider: Blayne Hope MD    Primary Insurance: [facility]  Secondary Insurance: [address] [facility] DETAILS:                                          Other Referral/Resources/Interventions Provided:  Financial Resources Provided: Indigent Transportation         Treatment Team Recommendation: Home  Discharge Destination Plan[de-identified] Home  Transport at Discharge : [address]  Dispatcher Contacted:  Yes

## 2023-07-25 NOTE — DISCHARGE SUMMARY
[address] [facility]  Discharge- Vicente Guidry 1960, 58 y.o. female MRN: [id number]  Unit/Bed#: [facility]-01 Encounter: [id number]  Primary Care Provider: Radha Martines MD   Date and time admitted to hospital: 7/23/2023  8:05 AM    Sepsis without acute organ dysfunction [facility])  Assessment & Plan  · As evidenced by tachycardia, tachypnea, elevated lactate  · S/P IVF bolus  · Clinically resolved    * RLL pneumonia  Assessment & Plan  Patient presented with cough, dyspnea, nausea, vomiting started 7/22 and progressively worsening. In ER, noted to be hypoxic, tachycardic and tachypneic with productive cough. No Leukocytosis. On exam, rhonchi and mild wheezing heard. Chest x-ray consistent with right lower lobe pneumonia  Elevated procalcitonin noted  Started patient on broad-spectrum antibiotic with IV Rocephin and azithromycin. D/C azithromycin  urinary Legionella/strep - negative, blood culture - negative to date  Discharge on oral cefdinir to complete antibiotic course  Clinically much improved      Acute respiratory failure with hypoxia [facility])  Assessment & Plan  Patient presented with hypoxia at 86%, improved with supplemental nasal cannula oxygen. Secondary to pneumonia  Stable on room air    Schreiber's esophagus  Assessment & Plan  Resumed Protonix and Carafate  Patient complaining of lot of epigastric discomfort and nausea 7/24. Added dose of Pepcid. Added further antiemetics. Now resolved    Hyponatremia  Assessment & Plan  Noted chronic hyponatremia likely secondary to SSRI?    Currently at 136    Rheumatoid arthritis involving both feet, unspecified whether rheumatoid factor present [facility])  Assessment & Plan  Taking Celebrex daily    Schizoaffective disorder [facility])  Assessment & Plan  Resume home medication ziprasidone, paroxetine, Seroquel  Ativan as needed    Essential hypertension  Assessment & Plan  Resume amlodipine 5 mg with holding parameters      Medical Problems     Resolved Problems  Date Reviewed: 7/25/2023   None       Discharging Physician / Practitioner: Damon Vasquez PA-C  PCP: Anson Salazar MD  Admission Date:   Admission Orders (From admission, onward)     Ordered        07/23/23 [facility]  Once                      Discharge Date: 07/25/23    Consultations During Hospital Stay:  · none    Procedures Performed:     CXR  Right lower lobe pneumonia. Significant Findings / Test Results:   · See above    Incidental Findings:   · none     Test Results Pending at Discharge (will require follow up):   · none     Outpatient Tests Requested:  · CXR in 4-6 weeks    Complications:  none    Reason for Admission: shortness of breath    Hospital Course:   Paty Narayanan is a 58 y.o. female patient who originally presented to the hospital on 7/23/2023 due to shortness of breath. She was found to have sepsis secondary to RLL pneumonia with hypoxia. Patient improved rapidly with antibiotics. She will complete a 7 day course of antibiotics and transition to oral cefdinir for 5 more days at discharge. Patient will discharge home in stable condition. Recommend follow up CXR in 4-6 weeks      Please see above list of diagnoses and related plan for additional information. Condition at Discharge: good    Discharge Day Visit / Exam:   Subjective:  Feels   Vitals: Blood Pressure: 125/85 (07/25/23 0738)  Pulse: 91 (07/25/23 0738)  Temperature: 97.9 °F (36.6 °C) (07/25/23 0738)  Temp Source: Oral (07/25/23 0738)  Respirations: 16 (07/25/23 0738)  Height: 5' 2" (157.5 cm) (07/23/23 2209)  Weight - Scale: 83.2 kg (183 lb 6.8 oz) (07/23/23 2209)  SpO2: 96 % (07/25/23 0738)  Exam:   Physical Exam  Constitutional:       Appearance: Normal appearance. Cardiovascular:      Rate and Rhythm: Normal rate and regular rhythm. Heart sounds: No murmur heard. Pulmonary:      Effort: Pulmonary effort is normal.      Breath sounds: Normal breath sounds. Abdominal:      General: Bowel sounds are normal. There is no distension. Palpations: Abdomen is soft. Tenderness: There is no abdominal tenderness. Skin:     General: Skin is warm and dry. Neurological:      General: No focal deficit present. Mental Status: She is alert and oriented to person, place, and time. Psychiatric:         Mood and Affect: Mood normal.          Discussion with Family: Patient declined call to . Discharge instructions/Information to patient and family:   See after visit summary for information provided to patient and family. Provisions for Follow-Up Care:  See after visit summary for information related to follow-up care and any pertinent home health orders. Disposition:   Home    Planned Readmission: none     Discharge Statement:  I spent 45 minutes discharging the patient. This time was spent on the day of discharge. I had direct contact with the patient on the day of discharge. Greater than 50% of the total time was spent examining patient, answering all patient questions, arranging and discussing plan of care with patient as well as directly providing post-discharge instructions. Additional time then spent on discharge activities. Discharge Medications:  See after visit summary for reconciled discharge medications provided to patient and/or family.       **Please Note: This note may have been constructed using a voice recognition system**

## 2023-07-25 NOTE — PLAN OF CARE
Problem: Potential for Falls  Goal: Patient will remain free of falls  Description: INTERVENTIONS:  - Educate patient/family on patient safety including physical limitations  - Instruct patient to call for assistance with activity   - Consult OT/PT to assist with strengthening/mobility   - Keep Call bell within reach  - Keep bed low and locked with side rails adjusted as appropriate  - Keep care items and personal belongings within reach  - Initiate and maintain comfort rounds  - Make Fall Risk Sign visible to staff  - Offer Toileting every  Hours, in advance of need  - Initiate/Maintain alarm  - Obtain necessary fall risk management equipment:   - Apply yellow socks and bracelet for high fall risk patients  - Consider moving patient to room near nurses station  Outcome: Progressing     Problem: PAIN - ADULT  Goal: Verbalizes/displays adequate comfort level or baseline comfort level  Description: Interventions:  - Encourage patient to monitor pain and request assistance  - Assess pain using appropriate pain scale  - Administer analgesics based on type and severity of pain and evaluate response  - Implement non-pharmacological measures as appropriate and evaluate response  - Consider cultural and social influences on pain and pain management  - Notify physician/advanced practitioner if interventions unsuccessful or patient reports new pain  Outcome: Progressing     Problem: INFECTION - ADULT  Goal: Absence or prevention of progression during hospitalization  Description: INTERVENTIONS:  - Assess and monitor for signs and symptoms of infection  - Monitor lab/diagnostic results  - Monitor all insertion sites, i.e. indwelling lines, tubes, and drains  - Monitor endotracheal if appropriate and nasal secretions for changes in amount and color  - [place] appropriate cooling/warming therapies per order  - Administer medications as ordered  - Instruct and encourage patient and family to use good hand hygiene technique  - Identify and instruct in appropriate isolation precautions for identified infection/condition  Outcome: Progressing  Goal: Absence of fever/infection during neutropenic period  Description: INTERVENTIONS:  - Monitor WBC    Outcome: Progressing     Problem: SAFETY ADULT  Goal: Patient will remain free of falls  Description: INTERVENTIONS:  - Educate patient/family on patient safety including physical limitations  - Instruct patient to call for assistance with activity   - Consult OT/PT to assist with strengthening/mobility   - Keep Call bell within reach  - Keep bed low and locked with side rails adjusted as appropriate  - Keep care items and personal belongings within reach  - Initiate and maintain comfort rounds  - Make Fall Risk Sign visible to staff  - Offer Toileting every  Hours, in advance of need  - Initiate/Maintain alarm  - Obtain necessary fall risk management equipment:   - Apply yellow socks and bracelet for high fall risk patients  - Consider moving patient to room near nurses station  Outcome: Progressing  Goal: Maintain or return to baseline ADL function  Description: INTERVENTIONS:  -  Assess patient's ability to carry out ADLs; assess patient's baseline for ADL function and identify physical deficits which impact ability to perform ADLs (bathing, care of mouth/teeth, toileting, grooming, dressing, etc.)  - Assess/evaluate cause of self-care deficits   - Assess range of motion  - Assess patient's mobility; develop plan if impaired  - Assess patient's need for assistive devices and provide as appropriate  - Encourage maximum independence but intervene and supervise when necessary  - Involve family in performance of ADLs  - Assess for home care needs following discharge   - Consider OT consult to assist with ADL evaluation and planning for discharge  - Provide patient education as appropriate  Outcome: Progressing  Goal: Maintains/Returns to pre admission functional level  Description: INTERVENTIONS:  - Perform BMAT or MOVE assessment daily.   - Set and communicate daily mobility goal to care team and patient/family/caregiver. - Collaborate with rehabilitation services on mobility goals if consulted  - Perform Range of Motion  times a day. - Reposition patient every  hours. - Dangle patient  times a day  - Stand patient  times a day  - Ambulate patient  times a day  - Out of bed to chair  times a day   - Out of bed for meals  times a day  - Out of bed for toileting  - Record patient progress and toleration of activity level   Outcome: Progressing     Problem: DISCHARGE PLANNING  Goal: Discharge to home or other facility with appropriate resources  Description: INTERVENTIONS:  - Identify barriers to discharge w/patient and caregiver  - Arrange for needed discharge resources and transportation as appropriate  - Identify discharge learning needs (meds, wound care, etc.)  - Arrange for interpretive services to assist at discharge as needed  - Refer to Case Management Department for coordinating discharge planning if the patient needs post-hospital services based on physician/advanced practitioner order or complex needs related to functional status, cognitive ability, or social support system  Outcome: Progressing     Problem: Knowledge Deficit  Goal: Patient/family/caregiver demonstrates understanding of disease process, treatment plan, medications, and discharge instructions  Description: Complete learning assessment and assess knowledge base.   Interventions:  - Provide teaching at level of understanding  - Provide teaching via preferred learning methods  Outcome: Progressing

## 2023-07-25 NOTE — UTILIZATION REVIEW
NOTIFICATION OF INPATIENT ADMISSION   AUTHORIZATION REQUEST   SERVICING FACILITY:   [address] [place] [address]  Address: [address] [place] [address]  Tax ID: [id number]  NPI: [id number] ATTENDING PROVIDER:  Attending Name and NPI#: Hayes Romero Md [[id number]]  Address: [address] [place] [address]  Phone: [phone]   ADMISSION INFORMATION:  Place of Service: [address] [place] [address]  Place of Service Code: 21  Inpatient Admission Date/Time: 7/23/23 11:23 AM  Discharge Date/Time: No discharge date for patient encounter. Admitting Diagnosis Code/Description:  Vertigo [R42]  Pneumonia [J18.9]  Hypoxia [R09.02]     UTILIZATION REVIEW CONTACT:  Arias Mariscal, Utilization   Network Utilization Review Department  Phone: [phone]  Fax: [phone]  Email: [email or web address]  Contact for approvals/pending authorizations, clinical reviews, and discharge. PHYSICIAN ADVISORY SERVICES:  Medical Necessity Denial & [facility]-Hgcm Review  Phone: [phone]  Fax: [phone]  Email: [email or web address]

## 2023-07-26 ENCOUNTER — TRANSITIONAL CARE MANAGEMENT (OUTPATIENT)
Dept: INTERNAL MEDICINE CLINIC | Facility: CLINIC | Age: 63
End: 2023-07-26

## 2023-07-28 ENCOUNTER — TELEPHONE (OUTPATIENT)
Dept: HEMATOLOGY ONCOLOGY | Facility: CLINIC | Age: 63
End: 2023-07-28

## 2023-07-28 LAB
BACTERIA BLD CULT: NORMAL
BACTERIA BLD CULT: NORMAL

## 2023-07-28 NOTE — TELEPHONE ENCOUNTER
Appointment Change  Cancel, Reschedule, Change to Virtual      Who are you speaking with? Patient   If it is not the patient, are they listed on an active communication consent form? N/A   Which provider is the appointment scheduled with? ABENA Mosquera   When is the appointment scheduled? Please list date and time  9/20/23 11:00AM   At which location is the appointment scheduled to take place? [facility]   Was the appointment rescheduled or changed from an in person visit to a virtual visit? If so, please list the details of the change. 9/25/23 1:30PM   What is the reason for the appointment change? Provider   Was [facility] transport scheduled for this visit? No   Does [facility] transport need to be scheduled for the new visit (if applicable) No   Does the patient need an infusion appointment rescheduled? No   Does the patient have an infusion appointment scheduled? If so, when? No   Is the patient undergoing chemotherapy? No   Was the no-show policy reviewed for appointments being changed with less then 24 hours of notice?  No

## 2023-08-01 ENCOUNTER — OFFICE VISIT (OUTPATIENT)
Dept: INTERNAL MEDICINE CLINIC | Facility: CLINIC | Age: 63
End: 2023-08-01
Payer: COMMERCIAL

## 2023-08-01 VITALS
BODY MASS INDEX: 33.86 KG/M2 | OXYGEN SATURATION: 96 % | WEIGHT: 184 LBS | DIASTOLIC BLOOD PRESSURE: 64 MMHG | RESPIRATION RATE: 16 BRPM | SYSTOLIC BLOOD PRESSURE: 118 MMHG | HEART RATE: 96 BPM | HEIGHT: 62 IN

## 2023-08-01 DIAGNOSIS — R51.9 NONINTRACTABLE HEADACHE, UNSPECIFIED CHRONICITY PATTERN, UNSPECIFIED HEADACHE TYPE: Primary | ICD-10-CM
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
## 2023-08-01 DIAGNOSIS — R42 DIZZINESS: ICD-10-CM

## 2023-08-01 DIAGNOSIS — J18.9 PNEUMONIA OF RIGHT LOWER LOBE DUE TO INFECTIOUS ORGANISM: ICD-10-CM

## 2023-08-01 PROCEDURE — 99496 TRANSJ CARE MGMT HIGH F2F 7D: CPT | Performed by: INTERNAL MEDICINE

## 2023-08-01 RX ORDER — CELECOXIB 200 MG/1
200 CAPSULE ORAL DAILY
Qty: 7 CAPSULE | Refills: 0 | Status: SHIPPED | OUTPATIENT
Start: 2023-08-01 | End: 2023-08-08 | Stop reason: SDUPTHER

## 2023-08-01 NOTE — PROGRESS NOTES
Assessment & Plan     1. Nonintractable headache, unspecified chronicity pattern, unspecified headache type  Assessment & Plan: With patient having a fall and hitting her head, will recheck CAT scan. Will consider referral to neurology if symptoms not improving, but expect this headache to just improve with time. Orders:  -     CT head wo contrast; Future; Expected date: 08/01/2023  -     celecoxib (CeleBREX) 200 mg capsule; Take 1 capsule (200 mg total) by mouth daily    2. Fall, sequela  -     CT head wo contrast; Future; Expected date: 08/01/2023    3. Dizziness  -     CT head wo contrast; Future; Expected date: 08/01/2023    4. Pneumonia of right lower lobe due to infectious organism  Assessment & Plan:  Improving, treated, will recheck chest x-ray in a month, not hypoxic, blood cultures negative    Orders:  -     XR chest pa & lateral; Future; Expected date: 09/01/2023         Subjective     Transitional Care Management Review:   Rich Luque is a 58 y.o. female here for TCM follow up. During the TCM phone call patient stated:  TCM Call     Date and time call was made  7/26/2023  3:32 PM    Hospital care reviewed  Records not available    Patient was hospitialized at  [address] [place] [address]    Date of Admission  07/23/23    Date of discharge  07/25/23    Diagnosis  RLL pneumonia    Disposition  Home    Were the patients medications reviewed and updated  No    Current Symptoms  Neausea    Dizziness severity  Mild    Neausea severity  Mild    Fatigue severity  Severe    Quality Character  Lightheadedness    Episode pattern  Intermittent      TCM Call     Post hospital issues  None    Should patient be enrolled in anticoag monitoring? No    Scheduled for follow up? Yes    Not clinically warranted  Patient readmitted.     Patients specialists  Other (comment)    Other specialists names  Michael Herrera    Did you obtain your prescribed medications  Yes    Why were you unable to obtain your medications need psych appt they will not get her in sooner than next week    Do you need help managing your prescriptions or medications  No    Is transportation to your appointment needed  No    I have advised the patient to call PCP with any new or worsening symptoms  Ruddy Will - /MA    Are you recieving any outpatient services  No    Are you recieving home care services  No    Types of home care services  Nurse visit    Are you using any community resources  No    Current waiver services  No    Have you fallen in the last 12 months  Yes    How many times  2    Interperter language line needed  No    Counseling  Patient        I reviewed pt's hospitalization for pneumonia with hypoxia. Since home she has a little wheezing and cough. No fevers, no chills, no chest pain, no SOB. She continues with headache and dizziness since the fall she had 7/15. Head 7/16 and 7/18 were normal.  Pt not on blood thinner    Review of Systems   Constitutional: Negative for chills, fatigue and fever. HENT: Negative for congestion, nosebleeds, postnasal drip, sore throat and trouble swallowing. Eyes: Negative for pain. Respiratory: Negative for cough, chest tightness, shortness of breath and wheezing. Cardiovascular: Negative for chest pain, palpitations and leg swelling. Gastrointestinal: Negative for abdominal pain, constipation, diarrhea, nausea and vomiting. Endocrine: Negative for polydipsia and polyuria. Genitourinary: Negative for dysuria, flank pain and hematuria. Musculoskeletal: Negative for arthralgias. Skin: Negative for rash. Neurological: Positive for dizziness, light-headedness and headaches. Negative for tremors. Hematological: Does not bruise/bleed easily. Psychiatric/Behavioral: Negative for confusion and dysphoric mood. The patient is not nervous/anxious.         Objective     /64   Pulse 96   Resp 16   Ht 5' 2" (1.575 m)   Wt 83.5 kg (184 lb)   SpO2 96%   BMI 33.65 kg/m² Physical Exam  Constitutional:       Appearance: Normal appearance. She is well-developed. HENT:      Head: Normocephalic and atraumatic. Right Ear: External ear normal.      Left Ear: External ear normal.      Nose: Nose normal.   Eyes:      General: No scleral icterus. Conjunctiva/sclera: Conjunctivae normal.   Neck:      Thyroid: No thyromegaly. Trachea: No tracheal deviation. Cardiovascular:      Rate and Rhythm: Normal rate and regular rhythm. Heart sounds: No murmur heard. Pulmonary:      Effort: Pulmonary effort is normal. No respiratory distress. Breath sounds: Normal breath sounds. No wheezing or rales. Musculoskeletal:      Cervical back: Normal range of motion and neck supple. Right lower leg: No edema. Left lower leg: No edema. Lymphadenopathy:      Cervical: No cervical adenopathy. Neurological:      Mental Status: She is alert and oriented to person, place, and time. Comments: Normal gait   Psychiatric:         Behavior: Behavior normal.         Thought Content:  Thought content normal.         Judgment: Judgment normal.       Medications have been reviewed by provider in current encounter    Blayne Hope MD

## 2023-08-01 NOTE — ASSESSMENT & PLAN NOTE
With patient having a fall and hitting her head, will recheck CAT scan. Will consider referral to neurology if symptoms not improving, but expect this headache to just improve with time.

## 2023-08-08 ENCOUNTER — OFFICE VISIT (OUTPATIENT)
Dept: INTERNAL MEDICINE CLINIC | Facility: CLINIC | Age: 63
End: 2023-08-08
Payer: MEDICARE

## 2023-08-08 VITALS
SYSTOLIC BLOOD PRESSURE: 128 MMHG | DIASTOLIC BLOOD PRESSURE: 84 MMHG | HEART RATE: 96 BPM | OXYGEN SATURATION: 98 % | BODY MASS INDEX: 33.58 KG/M2 | WEIGHT: 183.6 LBS

## 2023-08-08 DIAGNOSIS — R42 DIZZINESS: Primary | ICD-10-CM

## 2023-08-08 DIAGNOSIS — R51.9 NONINTRACTABLE HEADACHE, UNSPECIFIED CHRONICITY PATTERN, UNSPECIFIED HEADACHE TYPE: ICD-10-CM

## 2023-08-08 DIAGNOSIS — R51.9 NONINTRACTABLE HEADACHE, UNSPECIFIED CHRONICITY PATTERN, UNSPECIFIED HEADACHE TYPE: Primary | ICD-10-CM

## 2023-08-08 PROCEDURE — 99214 OFFICE O/P EST MOD 30 MIN: CPT | Performed by: INTERNAL MEDICINE

## 2023-08-08 RX ORDER — CELECOXIB 200 MG/1
200 CAPSULE ORAL DAILY
Qty: 30 CAPSULE | Refills: 5 | Status: SHIPPED | OUTPATIENT
Start: 2023-08-08

## 2023-08-08 NOTE — ASSESSMENT & PLAN NOTE
Avoid rapid head movements and position changes, will refer to physical therapy for this. With recent fall, repeat CT already orderedWith recent fall, repeat CT head already ordered.

## 2023-08-08 NOTE — PATIENT INSTRUCTIONS
Problem List Items Addressed This Visit          Other    Dizziness - Primary     Avoid rapid head movements and position changes, will refer to physical therapy for this. With recent fall, repeat CT already orderedWith recent fall, repeat CT head already ordered.          Relevant Orders    Ambulatory Referral to Physical Therapy    Headache    Relevant Medications    celecoxib (CeleBREX) 200 mg capsule

## 2023-08-08 NOTE — PROGRESS NOTES
Name: Brett Schaeffer      : 1960      MRN: [id number]  Encounter Provider: Rena Dominguez MD  Encounter Date: 2023   Encounter department: [facility]     1. Dizziness  Assessment & Plan:  Avoid rapid head movements and position changes, will refer to physical therapy for this. With recent fall, repeat CT already orderedWith recent fall, repeat CT head already ordered. Orders:  -     Ambulatory Referral to Physical Therapy; Future    2. Nonintractable headache, unspecified chronicity pattern, unspecified headache type  -     celecoxib (CeleBREX) 200 mg capsule; Take 1 capsule (200 mg total) by mouth daily           Subjective     Pt having ongoing dizziness. Headaches have been improving. Review of Systems   Respiratory: Negative for shortness of breath. Cardiovascular: Negative for chest pain. Neurological: Positive for dizziness and headaches (minor). Negative for light-headedness.        Past Medical History:   Diagnosis Date   • Anemia    • Anxiety    • Arthritis    • Back pain at L4-L5 level    • Schreiber esophagus    • Bulimia    • Colon polyp    • Disease of thyroid gland     hypothyroid   • GERD (gastroesophageal reflux disease)    • Hearing loss in left ear    • History of transfusion    • Hyperlipidemia    • Hypertension    • Hypothyroidism    • Leukopenia    • NMS (neuroleptic malignant syndrome) 2020   • Pneumonia    • RA (rheumatoid arthritis) ([address])     in feet   • Sciatica    • Seizure ([address])     due to medication mix up 2018 only one historically   • Skin spots, red     lower right arm - poss from cat- no s/s infection   • Synovial cyst of lumbar spine    • Vertigo    • Wears dentures     full set   • Weight gain      Past Surgical History:   Procedure Laterality Date   • BONE MARROW BIOPSY     • BREAST SURGERY      enlargement with implants   • CLOSED REDUCTION DISTAL FEMUR FRACTURE     • COLONOSCOPY     • COSMETIC SURGERY     • DENTAL SURGERY     • HIP ARTHROPLASTY Right 01/02/2020    Procedure: REVISION TOTAL HIP ARTHROPLASTY WITH REPAIR OF PARIPROSTHETIC FRACTURE - POSTERIOR APPROACH;  Surgeon: Ana Lilia Jj MD;  [facility];  Service: Orthopedics   • SD AMPUTATION METATARSAL W/TOE SINGLE Left 04/07/2023    Procedure: AMPUTATION TOE 4th;  Surgeon: Lucien Rodriguez DPM;  Location: [address] [place] [address] MAIN OR;  Service: Podiatry   • SD ARTHRP ACETBLR/PROX FEM PROSTC AGRFT/ALGRFT Right 12/11/2019    Procedure: ARTHROPLASTY HIP TOTAL ANTERIOR;  Surgeon: Ana Lilia Jj MD;  [facility];  Service: Orthopedics   • SD ESOPHAGOGASTRODUODENOSCOPY TRANSORAL DIAGNOSTIC N/A 05/11/2021    Procedure: ESOPHAGOGASTRODUODENOSCOPY (EGD); Surgeon: Linda Yo MD;  Location: [facility];  Service: General   • SD LAPS RPR PARAESPHGL HRNA INCL FUNDPLSTY [address] N/A 05/11/2021    Procedure: REPAIR HERNIA PARAESOPHAGEAL  LAPAROSCOPIC;  Surgeon:  Linda Yo MD;  [facility];  Service: General   • SD UNLISTED PROCEDURE ESOPHAGUS N/A 05/11/2021    Procedure: FUNDOPLICATION TRANSORAL INCISIONLESS;  Surgeon: Tiffany Cooper MD;  [facility];  Service: Gastroenterology   • RHINOPLASTY     • SINUS SURGERY     • TOE AMPUTATION Left     2023 4th toe   • TRANSORAL INCISIONLESS FUNDOPLICATION (TIF)  72/66/7551     Family History   Problem Relation Age of Onset   • Uterine cancer Mother    • Esophageal cancer Father    • Colon cancer Maternal Grandmother      Social History     Socioeconomic History   • Marital status: Single     Spouse name: None   • Number of children: None   • Years of education: None   • Highest education level: None   Occupational History   • None   Tobacco Use   • Smoking status: Never   • Smokeless tobacco: Never   Vaping Use   • Vaping Use: Never used   Substance and Sexual Activity   • Alcohol use: Not Currently   • Drug use: Not Currently   • Sexual activity: Not Currently   Other Topics Concern   • None Social History Narrative    Family disruption death of family member parent, per allscripts     Social Determinants of Health     Financial Resource Strain: Medium Risk (1/4/2023)    Overall Financial Resource Strain (CARDIA)    • Difficulty of Paying Living Expenses: Somewhat hard   Food Insecurity: No Food Insecurity (12/20/2022)    Hunger Vital Sign    • Worried About Running Out of Food in the Last Year: Never true    • Ran Out of Food in the Last Year: Never true   Transportation Needs: No Transportation Needs (1/4/2023)    PRAPARE - Transportation    • Lack of Transportation (Medical): No    • Lack of Transportation (Non-Medical): No   Physical Activity: Not on file   Stress: Stress Concern Present (5/18/2021)    [facility]    • Feeling of Stress : To some extent   Social Connections: Not on file   Intimate Partner Violence: Not on file   Housing Stability: Low Risk  (12/20/2022)    Housing Stability Vital Sign    • Unable to Pay for Housing in the Last Year: No    • Number of Places Lived in the Last Year: 1    • Unstable Housing in the Last Year: No     Current Outpatient Medications on File Prior to Visit   Medication Sig   • amLODIPine (NORVASC) 5 mg tablet TAKE 1 TABLET (5 MG TOTAL) BY MOUTH DAILY.    • ferrous sulfate 325 (65 Fe) mg tablet Take 1 tablet (325 mg total) by mouth 2 (two) times a day with meals   • folic acid (FOLVITE) 098 mcg tablet Take 1 tablet (400 mcg total) by mouth daily   • levothyroxine 25 mcg tablet TAKE 1 TABLET BY MOUTH EVERY DAY   • lidocaine (Lidoderm) 5 % Apply 1 patch topically over 12 hours daily Remove & Discard patch within 12 hours or as directed by MD   • LORazepam (ATIVAN) 2 mg tablet Take 1 tablet (2 mg total) by mouth every 6 (six) hours as needed for anxiety   • ondansetron (ZOFRAN) 4 mg tablet Take 1 tablet (4 mg total) by mouth every 8 (eight) hours as needed for nausea or vomiting   • pantoprazole (PROTONIX) 40 mg tablet Take 1 tablet (40 mg total) by mouth 2 (two) times a day   • PARoxetine (PAXIL) 30 mg tablet Take 2 tablets (60 mg total) by mouth in the morning   • QUEtiapine (SEROquel XR) 400 mg 24 hr tablet Take 1 tablet (400 mg total) by mouth daily at bedtime   • sucralfate (CARAFATE) 1 g tablet TAKE 1 TABLET (1 G TOTAL) BY MOUTH 3 (THREE) TIMES A DAY WITH MEALS   • triamcinolone (KENALOG) 0.1 % cream APPLY TOPICALLY 2 TIMES A DAY AS NEEDED FOR RASH. • vitamin B-12 (VITAMIN B-12) 1,000 mcg tablet Take 1 tablet (1,000 mcg total) by mouth daily   • ziprasidone (GEODON) 80 mg capsule TAKE 1 CAPSULE BY MOUTH EVERY DAY   • [DISCONTINUED] celecoxib (CeleBREX) 200 mg capsule Take 1 capsule (200 mg total) by mouth daily     Allergies   Allergen Reactions   • Risperdal [Risperidone] Shortness Of Breath     Rapid heart beat, SOB   • Zyprexa [Olanzapine] Shortness Of Breath     Rapid heartbeat   • Latex Rash     Band aids, adhesives wears normal underwear, can eat bananas  USE PAPER TAPE     Immunization History   Administered Date(s) Administered   • COVID-19 PFIZER VACCINE 0.3 ML IM 03/20/2021, 04/10/2021, 10/09/2021   • H1N1, All Formulations 11/17/2009   • INFLUENZA 11/17/2009, 01/04/2013, 11/11/2014, 10/20/2015, 10/31/2016, 09/16/2018   • Influenza Injectable, MDCK, Preservative Free, Quadrivalent, 0.5 mL 09/26/2019   • Influenza Quadrivalent, 6-35 Months IM 10/31/2016   • Influenza, injectable, quadrivalent, preservative free 0.5 mL 09/05/2020   • Influenza, recombinant, quadrivalent,injectable, preservative free 11/04/2021   • Pneumococcal Conjugate 13-Valent 09/16/2018   • Rabies 07/29/2014, 08/01/2014, 08/05/2014   • Tdap 07/29/2014, 12/17/2022       Objective     /84   Pulse 96   Wt 83.3 kg (183 lb 9.6 oz)   SpO2 98%   BMI 33.58 kg/m²     Physical Exam  Constitutional:       General: She is not in acute distress. Cardiovascular:      Rate and Rhythm: Normal rate and regular rhythm. Heart sounds: Normal heart sounds. No murmur heard. Neurological:      Mental Status: She is alert.       Comments: Normal gait, no rotational or vertical nystagmus, no vertical deviation of eye when uncovered, normal speech, good finger-to-nose movement       Grecia Stewart MD

## 2023-08-09 ENCOUNTER — TELEPHONE (OUTPATIENT)
Dept: NEUROLOGY | Facility: CLINIC | Age: 63
End: 2023-08-09

## 2023-08-09 ENCOUNTER — HOSPITAL ENCOUNTER (OUTPATIENT)
Dept: RADIOLOGY | Facility: CLINIC | Age: 63
Discharge: HOME | End: 2023-08-09
Payer: MEDICARE

## 2023-08-09 VITALS
OXYGEN SATURATION: 95 % | RESPIRATION RATE: 18 BRPM | TEMPERATURE: 97.7 F | HEART RATE: 81 BPM | DIASTOLIC BLOOD PRESSURE: 86 MMHG | SYSTOLIC BLOOD PRESSURE: 134 MMHG

## 2023-08-09 DIAGNOSIS — M47.816 LUMBAR SPONDYLOSIS: ICD-10-CM

## 2023-08-09 PROCEDURE — 64494 INJ PARAVERT F JNT L/S 2 LEV: CPT | Performed by: ANESTHESIOLOGY

## 2023-08-09 PROCEDURE — 64493 INJ PARAVERT F JNT L/S 1 LEV: CPT | Performed by: ANESTHESIOLOGY

## 2023-08-09 RX ORDER — LIDOCAINE HYDROCHLORIDE 10 MG/ML
10 INJECTION, SOLUTION EPIDURAL; INFILTRATION; INTRACAUDAL; PERINEURAL ONCE
Status: COMPLETED | OUTPATIENT
Start: 2023-08-09 | End: 2023-08-09

## 2023-08-09 RX ORDER — BUPIVACAINE HYDROCHLORIDE 5 MG/ML
30 INJECTION, SOLUTION EPIDURAL; INTRACAUDAL ONCE
Status: COMPLETED | OUTPATIENT
Start: 2023-08-09 | End: 2023-08-09

## 2023-08-09 RX ADMIN — LIDOCAINE HYDROCHLORIDE 4 ML: 10 INJECTION, SOLUTION EPIDURAL; INFILTRATION; INTRACAUDAL; PERINEURAL at 09:50

## 2023-08-09 RX ADMIN — BUPIVACAINE HYDROCHLORIDE 3 ML: 5 INJECTION, SOLUTION EPIDURAL; INTRACAUDAL; PERINEURAL at 09:50

## 2023-08-09 NOTE — H&P
History of Present Illness: The patient is a 58 y.o. female who presents with complaints of low back pain. Past Medical History:   Diagnosis Date   • Anemia    • Anxiety    • Arthritis    • Back pain at L4-L5 level    • Schreiber esophagus    • Bulimia    • Colon polyp    • Disease of thyroid gland     hypothyroid   • GERD (gastroesophageal reflux disease)    • Hearing loss in left ear    • History of transfusion    • Hyperlipidemia    • Hypertension    • Hypothyroidism    • Leukopenia    • NMS (neuroleptic malignant syndrome) 01/03/2020   • Pneumonia    • RA (rheumatoid arthritis) ([address])     in feet   • Sciatica    • Seizure ([address])     due to medication mix up 8/2018 only one historically   • Skin spots, red     lower right arm - poss from cat- no s/s infection   • Synovial cyst of lumbar spine    • Vertigo    • Wears dentures     full set   • Weight gain        Past Surgical History:   Procedure Laterality Date   • BONE MARROW BIOPSY     • BREAST SURGERY      enlargement with implants   • CLOSED REDUCTION DISTAL FEMUR FRACTURE     • COLONOSCOPY     • COSMETIC SURGERY     • DENTAL SURGERY     • HIP ARTHROPLASTY Right 01/02/2020    Procedure: REVISION TOTAL HIP ARTHROPLASTY WITH REPAIR OF PARIPROSTHETIC FRACTURE - POSTERIOR APPROACH;  Surgeon: Felicitas Cushing, MD;  [facility];  Service: Orthopedics   • ND AMPUTATION METATARSAL W/TOE SINGLE Left 04/07/2023    Procedure: AMPUTATION TOE 4th;  Surgeon: Taco Burnett DPM;  Location: [address] [place] [address] MAIN OR;  Service: Podiatry   • ND ARTHRP ACETBLR/PROX FEM PROSTC AGRFT/ALGRFT Right 12/11/2019    Procedure: ARTHROPLASTY HIP TOTAL ANTERIOR;  Surgeon: Felicitas Cushing, MD;  [facility];  Service: Orthopedics   • ND ESOPHAGOGASTRODUODENOSCOPY TRANSORAL DIAGNOSTIC N/A 05/11/2021    Procedure: ESOPHAGOGASTRODUODENOSCOPY (EGD); Surgeon:  Jl Hirsch MD;  [facility];  Service: General   • ND LAPS RPR PARAESPHGL HRNA INCL [facility] [address] N/A 05/11/2021 Procedure: REPAIR HERNIA PARAESOPHAGEAL  LAPAROSCOPIC;  Surgeon: Robert Hernandez MD;  [facility]: [facility];  Service: General   • [place] UNLISTED PROCEDURE ESOPHAGUS N/A 05/11/2021    Procedure: FUNDOPLICATION TRANSORAL INCISIONLESS;  Surgeon: Mamadou Gonzales MD;  [facility];  Service: Gastroenterology   • RHINOPLASTY     • SINUS SURGERY     • TOE AMPUTATION Left     2023 4th toe   • TRANSORAL INCISIONLESS FUNDOPLICATION (TIF)  20/21/8373         Current Outpatient Medications:   •  amLODIPine (NORVASC) 5 mg tablet, TAKE 1 TABLET (5 MG TOTAL) BY MOUTH DAILY. , Disp: 90 tablet, Rfl: 3  •  celecoxib (CeleBREX) 200 mg capsule, Take 1 capsule (200 mg total) by mouth daily, Disp: 30 capsule, Rfl: 5  •  ferrous sulfate 325 (65 Fe) mg tablet, Take 1 tablet (325 mg total) by mouth 2 (two) times a day with meals, Disp: 60 tablet, Rfl: 0  •  folic acid (FOLVITE) 304 mcg tablet, Take 1 tablet (400 mcg total) by mouth daily, Disp: 30 tablet, Rfl: 0  •  levothyroxine 25 mcg tablet, TAKE 1 TABLET BY MOUTH EVERY DAY, Disp: 90 tablet, Rfl: 3  •  lidocaine (Lidoderm) 5 %, Apply 1 patch topically over 12 hours daily Remove & Discard patch within 12 hours or as directed by MD, Disp: 5 patch, Rfl: 0  •  LORazepam (ATIVAN) 2 mg tablet, Take 1 tablet (2 mg total) by mouth every 6 (six) hours as needed for anxiety, Disp: 120 tablet, Rfl: 1  •  ondansetron (ZOFRAN) 4 mg tablet, Take 1 tablet (4 mg total) by mouth every 8 (eight) hours as needed for nausea or vomiting, Disp: 20 tablet, Rfl: 5  •  pantoprazole (PROTONIX) 40 mg tablet, Take 1 tablet (40 mg total) by mouth 2 (two) times a day, Disp: 180 tablet, Rfl: 3  •  PARoxetine (PAXIL) 30 mg tablet, Take 2 tablets (60 mg total) by mouth in the morning, Disp: 180 tablet, Rfl: 3  •  QUEtiapine (SEROquel XR) 400 mg 24 hr tablet, Take 1 tablet (400 mg total) by mouth daily at bedtime, Disp: 90 tablet, Rfl: 3  •  sucralfate (CARAFATE) 1 g tablet, TAKE 1 TABLET (1 G TOTAL) BY MOUTH 3 (THREE) TIMES A DAY WITH MEALS, Disp: 270 tablet, Rfl: 3  •  triamcinolone (KENALOG) 0.1 % cream, APPLY TOPICALLY 2 TIMES A DAY AS NEEDED FOR RASH., Disp: 160 g, Rfl: 5  •  vitamin B-12 (VITAMIN B-12) 1,000 mcg tablet, Take 1 tablet (1,000 mcg total) by mouth daily, Disp: 90 tablet, Rfl: 1  •  ziprasidone (GEODON) 80 mg capsule, TAKE 1 CAPSULE BY MOUTH EVERY DAY, Disp: 90 capsule, Rfl: 3    Allergies   Allergen Reactions   • Risperdal [Risperidone] Shortness Of Breath     Rapid heart beat, SOB   • Zyprexa [Olanzapine] Shortness Of Breath     Rapid heartbeat   • Latex Rash     Band aids, adhesives wears normal underwear, can eat bananas  USE PAPER TAPE       Physical Exam:   Vitals:    08/09/23 0914   BP: 121/83   Pulse: 72   Resp: 18   Temp: 97.7 °F (36.5 °C)   SpO2: 96%     General: Awake, Alert, Oriented x 3, Mood and affect appropriate  Respiratory: Respirations even and unlabored  Cardiovascular: Peripheral pulses intact; no edema  Musculoskeletal Exam: Bilateral lumbar paraspinals tender to palpation    ASA Score: 3         Assessment:   1.  Lumbar spondylosis        Plan: B/L L3-5 MBB#2

## 2023-08-09 NOTE — TELEPHONE ENCOUNTER
SAMUEL 8/9/23 at 11:48 am, austin'luis 8/9/23 at 19:23:    Patient returning call about scheduling an appointment.  Please call  [phone]

## 2023-08-09 NOTE — DISCHARGE INSTR - LAB

## 2023-08-11 ENCOUNTER — APPOINTMENT (OUTPATIENT)
Dept: RADIOLOGY | Age: 63
End: 2023-08-11
Payer: COMMERCIAL

## 2023-08-11 DIAGNOSIS — J18.9 PNEUMONIA DUE TO INFECTIOUS ORGANISM, UNSPECIFIED LATERALITY, UNSPECIFIED PART OF LUNG: Primary | ICD-10-CM

## 2023-08-11 DIAGNOSIS — J18.9 PNEUMONIA OF RIGHT LOWER LOBE DUE TO INFECTIOUS ORGANISM: ICD-10-CM

## 2023-08-11 PROCEDURE — 71046 X-RAY EXAM CHEST 2 VIEWS: CPT

## 2023-08-14 DIAGNOSIS — M47.816 LUMBAR SPONDYLOSIS: Primary | ICD-10-CM

## 2023-08-15 ENCOUNTER — OFFICE VISIT (OUTPATIENT)
Dept: GASTROENTEROLOGY | Facility: CLINIC | Age: 63
End: 2023-08-15
Payer: COMMERCIAL

## 2023-08-15 ENCOUNTER — TELEPHONE (OUTPATIENT)
Dept: RADIOLOGY | Facility: CLINIC | Age: 63
End: 2023-08-15

## 2023-08-15 VITALS
BODY MASS INDEX: 33.13 KG/M2 | SYSTOLIC BLOOD PRESSURE: 131 MMHG | DIASTOLIC BLOOD PRESSURE: 84 MMHG | HEIGHT: 62 IN | WEIGHT: 180 LBS | HEART RATE: 102 BPM

## 2023-08-15 DIAGNOSIS — K31.84 GASTROPARESIS: Primary | ICD-10-CM

## 2023-08-15 DIAGNOSIS — K21.01 GASTROESOPHAGEAL REFLUX DISEASE WITH ESOPHAGITIS AND HEMORRHAGE: ICD-10-CM

## 2023-08-15 DIAGNOSIS — D50.0 IRON DEFICIENCY ANEMIA DUE TO CHRONIC BLOOD LOSS: ICD-10-CM

## 2023-08-15 DIAGNOSIS — R11.0 NAUSEA: ICD-10-CM

## 2023-08-15 DIAGNOSIS — Z87.19 HISTORY OF REPAIR OF HIATAL HERNIA: ICD-10-CM

## 2023-08-15 DIAGNOSIS — Z98.890 HISTORY OF REPAIR OF HIATAL HERNIA: ICD-10-CM

## 2023-08-15 PROCEDURE — 99214 OFFICE O/P EST MOD 30 MIN: CPT | Performed by: INTERNAL MEDICINE

## 2023-08-15 RX ORDER — ONDANSETRON 4 MG/1
4 TABLET, FILM COATED ORAL EVERY 8 HOURS PRN
Qty: 20 TABLET | Refills: 5 | Status: SHIPPED | OUTPATIENT
Start: 2023-08-15

## 2023-08-15 NOTE — PROGRESS NOTES
Sachin Simms [facility] - Outpatient Follow-up Note  Magnolia Stevens 58 y.o. female MRN: [id number]  Encounter: [id number]          ASSESSMENT AND PLAN:      1. Gastroparesis  Most likely related to Seroquel and Geodon use, psychiatric medication interfere with gastric motility. She is taking Zofran as needed, she is not a candidate for Reglan because there is a major interaction with Seroquel and Geodon. I advised her for gastroparesis diet, small tashia, insurance denied Botox injectionls. She currently denying any nausea or vomiting, will manage her symptoms conservatively    2. History of repair of hiatal hernia  Recurrence of hiatal hernia, last endoscopy shows recurrence of hiatal hernia, I discussed about continuing pantoprazole twice a day and Carafate 4 times a day for now, if symptoms get worse then will consider for revision surgery    3. Gastroesophageal reflux disease with esophagitis and hemorrhage  Continue with Protonix 40 mg twice a day and liquid Carafate 4 times a day    4. Iron deficiency anemia due to chronic blood loss  Patient is following with hematologist, iron infusion was started, hemoglobin is slowly trending up, patient stool color appeared normal.  Will monitor CBC, she is up-to-date with EGD and colonoscopy    ______________________________________________________________________    SUBJECTIVE: Patient seen and examined, she come for follow-up, she was hospitalized at [facility] with intractable nausea, vomiting and iron deficiency anemia, during hospital stay she was seen by GI team and recommended repeat EGD and colonoscopy which she refused. She was given blood transfusion and then iron infusion, her hemoglobin is slowly trending up, her nausea and vomiting resolved with Zofran. Her last endoscopy shows recurrence of hiatal hernia, she is here to discuss about revision surgery.   Her symptoms related to reflux are well controlled with pantoprazole twice a day and Carafate 4 times a day. I advised her to wait for few month, if symptoms persist then will consider for revision TIF otherwise manage conservatively, she is not a candidate for revision laparoscopic hiatal hernia repair surgery because of the scarring and adhesion, I think her nausea and vomiting related to anxiety which is now better controlled. She takes Seroquel and Geodon regularly, she follow-up with psychiatrist regularly, she denying any smoking or any chronic alcohol use    REVIEW OF SYSTEMS IS OTHERWISE NEGATIVE.       Historical Information   Past Medical History:   Diagnosis Date   • Anemia    • Anxiety    • Arthritis    • Back pain at L4-L5 level    • Schreiber esophagus    • Bulimia    • Colon polyp    • Disease of thyroid gland     hypothyroid   • GERD (gastroesophageal reflux disease)    • Hearing loss in left ear    • History of transfusion    • Hyperlipidemia    • Hypertension    • Hypothyroidism    • Leukopenia    • NMS (neuroleptic malignant syndrome) 01/03/2020   • Pneumonia    • RA (rheumatoid arthritis) ([address])     in feet   • Sciatica    • Seizure ([address])     due to medication mix up 8/2018 only one historically   • Skin spots, red     lower right arm - poss from cat- no s/s infection   • Synovial cyst of lumbar spine    • Vertigo    • Wears dentures     full set   • Weight gain      Past Surgical History:   Procedure Laterality Date   • BONE MARROW BIOPSY     • BREAST SURGERY      enlargement with implants   • CLOSED REDUCTION DISTAL FEMUR FRACTURE     • COLONOSCOPY     • COSMETIC SURGERY     • DENTAL SURGERY     • HIP ARTHROPLASTY Right 01/02/2020    Procedure: REVISION TOTAL HIP ARTHROPLASTY WITH REPAIR OF PARIPROSTHETIC FRACTURE - POSTERIOR APPROACH;  Surgeon: Kevin Monae MD;  Location: [facility];  Service: Orthopedics   • CA AMPUTATION METATARSAL W/TOE SINGLE Left 04/07/2023    Procedure: AMPUTATION TOE 4th;  Surgeon: Cristopher Spence DPM;  Location: [address] [place] [address] OR;  Service: Podiatry   • NH ARTHRP ACETBLR/PROX FEM PROSTC AGRFT/ALGRFT Right 12/11/2019    Procedure: ARTHROPLASTY HIP TOTAL ANTERIOR;  Surgeon: Loida Garcia MD;  [facility];  Service: Orthopedics   • NH ESOPHAGOGASTRODUODENOSCOPY TRANSORAL DIAGNOSTIC N/A 05/11/2021    Procedure: ESOPHAGOGASTRODUODENOSCOPY (EGD); Surgeon: Madeleine Ruby MD;  Location[facility];  Service: General   • NH LAPS RPR PARAESPHGL HRNA INCL FUNDPLSTY [address] N/A 05/11/2021    Procedure: REPAIR HERNIA PARAESOPHAGEAL  LAPAROSCOPIC;  Surgeon:  Madeleine Ruby MD;  [facility];  Service: General   • NH UNLISTED PROCEDURE ESOPHAGUS N/A 05/11/2021    Procedure: FUNDOPLICATION TRANSORAL INCISIONLESS;  Surgeon: Anahi Avalos MD;  [facility];  Service: Gastroenterology   • RHINOPLASTY     • SINUS SURGERY     • TOE AMPUTATION Left     2023 4th toe   • TRANSORAL INCISIONLESS FUNDOPLICATION (TIF)  23/12/7191     Social History   Social History     Substance and Sexual Activity   Alcohol Use Not Currently     Social History     Substance and Sexual Activity   Drug Use Not Currently     Social History     Tobacco Use   Smoking Status Never   Smokeless Tobacco Never     Family History   Problem Relation Age of Onset   • Uterine cancer Mother    • Esophageal cancer Father    • Colon cancer Maternal Grandmother        Meds/Allergies       Current Outpatient Medications:   •  amLODIPine (NORVASC) 5 mg tablet  •  celecoxib (CeleBREX) 200 mg capsule  •  ferrous sulfate 325 (65 Fe) mg tablet  •  folic acid (FOLVITE) 659 mcg tablet  •  levothyroxine 25 mcg tablet  •  LORazepam (ATIVAN) 2 mg tablet  •  ondansetron (ZOFRAN) 4 mg tablet  •  pantoprazole (PROTONIX) 40 mg tablet  •  PARoxetine (PAXIL) 30 mg tablet  •  QUEtiapine (SEROquel XR) 400 mg 24 hr tablet  •  sucralfate (CARAFATE) 1 g tablet  •  triamcinolone (KENALOG) 0.1 % cream  •  vitamin B-12 (VITAMIN B-12) 1,000 mcg tablet  •  ziprasidone (GEODON) 80 mg capsule  • lidocaine (Lidoderm) 5 %    Allergies   Allergen Reactions   • Risperdal [Risperidone] Shortness Of Breath     Rapid heart beat, SOB   • Zyprexa [Olanzapine] Shortness Of Breath     Rapid heartbeat   • Latex Rash     Band aids, adhesives wears normal underwear, can eat bananas  USE PAPER TAPE           Objective     Blood pressure 131/84, pulse 102, height 5' 2" (1.575 m), weight 81.6 kg (180 lb), not currently breastfeeding. Body mass index is 32.92 kg/m². PHYSICAL EXAM:      General Appearance:   Alert, cooperative, no distress   HEENT:   Normocephalic, atraumatic, anicteric.     Neck:  Supple, symmetrical, trachea midline   Lungs:   Clear to auscultation bilaterally; no rales, rhonchi or wheezing; respirations unlabored    Heart[de-identified]   Regular rate and rhythm; no murmur, rub, or gallop. Abdomen:   Soft, non-tender, non-distended; normal bowel sounds; no masses, no organomegaly    Genitalia:   Deferred    Rectal:   Deferred    Extremities:  No cyanosis, clubbing or edema    Pulses:  2+ and symmetric    Skin:  No jaundice, rashes, or lesions    Lymph nodes:  No palpable cervical lymphadenopathy        Lab Results:   No visits with results within 1 Day(s) from this visit.    Latest known visit with results is:   Admission on 07/23/2023, Discharged on 07/25/2023   Component Date Value   • Ventricular Rate 07/23/2023 128    • Atrial Rate 07/23/2023 128    • AK Interval 07/23/2023 138    • QRSD Interval 07/23/2023 74    • QT Interval 07/23/2023 280    • QTC Interval 07/23/2023 408    • P Axis 07/23/2023 62    • QRS Axis 07/23/2023 -10    • T Wave Axis 07/23/2023 70    • WBC 07/23/2023 4.47    • RBC 07/23/2023 4.89    • Hemoglobin 07/23/2023 12.8    • Hematocrit 07/23/2023 39.0    • MCV 07/23/2023 80 (L)    • MCH 07/23/2023 26.2 (L)    • MCHC 07/23/2023 32.8    • RDW 07/23/2023 19.9 (H)    • MPV 07/23/2023 8.0 (L)    • Platelets 50/97/6146 194    • nRBC 07/23/2023 0    • Neutrophils Relative 07/23/2023 89 (H)    • Immat GRANS % 07/23/2023 0    • Lymphocytes Relative 07/23/2023 4 (L)    • Monocytes Relative 07/23/2023 6    • Eosinophils Relative 07/23/2023 1    • Basophils Relative 07/23/2023 0    • Neutrophils Absolute 07/23/2023 3.96    • Immature Grans Absolute 07/23/2023 0.01    • Lymphocytes Absolute 07/23/2023 0.18 (L)    • Monocytes Absolute 07/23/2023 0.25    • Eosinophils Absolute 07/23/2023 0.05    • Basophils Absolute 07/23/2023 0.02    • Sodium 07/23/2023 130 (L)    • Potassium 07/23/2023 5.4 (H)    • Chloride 07/23/2023 98    • CO2 07/23/2023 26    • ANION GAP 07/23/2023 6    • BUN 07/23/2023 13    • Creatinine 07/23/2023 0.88    • Glucose 07/23/2023 95    • Calcium 07/23/2023 11.5 (H)    • AST 07/23/2023 46 (H)    • ALT 07/23/2023 35    • Alkaline Phosphatase 07/23/2023 84    • Total Protein 07/23/2023 7.2    • Albumin 07/23/2023 3.6    • Total Bilirubin 07/23/2023 0.25    • eGFR 07/23/2023 70    • Lipase 07/23/2023 57 (L)    • hs TnI 0hr 07/23/2023 12    • Procalcitonin 07/23/2023 0.41 (H)    • LACTIC ACID 07/23/2023 2.6 (HH)    • Blood Culture 07/23/2023 No Growth After 5 Days. • Blood Culture 07/23/2023 No Growth After 5 Days.     • hs TnI 2hr 07/23/2023 18    • Delta 2hr hsTnI 07/23/2023 6    • hs TnI 4hr 07/23/2023 18    • Delta 4hr hsTnI 07/23/2023 6    • SARS-CoV-2 07/23/2023 Negative    • INFLUENZA A PCR 07/23/2023 Negative    • INFLUENZA B PCR 07/23/2023 Negative    • RSV PCR 07/23/2023 Negative    • LACTIC ACID 07/23/2023 2.4 (HH)    • Strep pneumoniae antigen* 07/23/2023 Negative    • Legionella Urinary Antig* 07/23/2023 Negative    • Procalcitonin 07/24/2023 1.01 (H)    • Sodium 07/25/2023 136    • Potassium 07/25/2023 4.2    • Chloride 07/25/2023 104    • CO2 07/25/2023 27    • ANION GAP 07/25/2023 5    • BUN 07/25/2023 8    • Creatinine 07/25/2023 0.56 (L)    • Glucose 07/25/2023 90    • Calcium 07/25/2023 9.1    • eGFR 07/25/2023 100    • WBC 07/25/2023 3.43 (L)    • RBC 07/25/2023 4.15    • Hemoglobin 07/25/2023 11.2 (L)    • Hematocrit 07/25/2023 34.6 (L)    • MCV 07/25/2023 83    • MCH 07/25/2023 27.0    • MCHC 07/25/2023 32.4    • RDW 07/25/2023 20.1 (H)    • Platelets 59/33/0358 177    • MPV 07/25/2023 8.3 (L)    • Procalcitonin 07/25/2023 0.55 (H)          Radiology Results:   XR chest pa & lateral    Result Date: 8/11/2023  Narrative: CHEST INDICATION:   J18.9: Pneumonia, unspecified organism. COMPARISON: Chest radiograph 7/23/2023. EXAM PERFORMED/VIEWS:  XR CHEST PA & LATERAL FINDINGS: Cardiomediastinal silhouette appears unremarkable. Near complete resolution of the right lower lobe airspace opacity. No pneumothorax or pleural effusion. Osseous structures appear within normal limits for patient age. Impression: Near complete resolution of the right lower lobe airspace opacity. Recommend follow-up chest radiographs in 8 to 12 weeks to assess for complete resolution. The study was marked in EPIC for significant notification. Workstation performed: [id number]     FL spine and pain procedure    Result Date: 8/9/2023  Narrative: LUMBAR Medial Branch Nerve Block Indication: Mechanical low back pain Preoperative diagnosis: 1. Lumbar spondylosis without myelopathy 2. Low back pain Postoperative diagnosis: 1. Lumbar spondylosis without myelopathy 2. Low back pain Procedure: Fluoroscopically-guided Medial Branch Nerve/Dorsal Ramus Blocks of the bilateral L4-5 and L5-S1 facet joint(s) using 0.5% bupivacaine After discussing the risks, benefits, and alternatives to the procedure, the patient expressed understanding and wished to proceed. The patient was brought to the fluoroscopy suite and placed in the prone position. Procedural pause conducted to verify: correct patient identity, procedure to be performed and as applicable, correct side and site, correct patient position, and availability of implants, special equipment and special requirements.  Using fluoroscopy, the junction of the transverse process and superior articulating process of the right L4, L5, and sacral ala levels were identified and marked. The skin was sterilely prepped and draped in the usual fashion using Chloraprep skin prep. Skin wheal was made at each site with 1% lidocaine. Using fluoroscopic guidance, a 3.5 inch 25 gauge spinal needle was advanced to each target. After negative aspiration, 0.5cc of 0.5% bupivacaine was injected at each site and the needles were then removed. The procedure was repeated in the exact same way in the office after the same levels. The patient tolerated the procedure well and there were no apparent complications. After appropriate observation, the patient was dismissed from the clinic in good condition under their own power. The patient was instructed to keep a pain diary and report the results at her follow up appointment. EBL: Minimal Specimen: None    XR chest 2 views    Result Date: 7/23/2023  Narrative: CHEST INDICATION:   hypoxia. COMPARISON: 6/25/2023 EXAM PERFORMED/VIEWS:  XR CHEST PA & LATERAL The frontal view was performed utilizing dual energy radiographic technique. Images: 4 FINDINGS: Cardiomediastinal silhouette appears unremarkable. There is homogeneous opacity noted in the right lower lobe on both views consistent with pneumonia. The lungs are otherwise clear. No evidence for pneumothorax or pleural effusion. Osseous structures appear within normal limits for patient age. Impression: Right lower lobe pneumonia. The study was marked in [facility] for immediate notification. Workstation performed: [id number]     CT head wo contrast    Result Date: 7/18/2023  Narrative: CT BRAIN - WITHOUT CONTRAST INDICATION:   Headache, chronic, no new features headache, on aspirin, fall 3 days ago. COMPARISON: Head CT July 16, 2023 TECHNIQUE:  CT examination of the brain was performed. Multiplanar 2D reformatted images were created from the source data.  Radiation dose length product (DLP) for this visit: 864.56 mGy-cm . This examination, like all CT scans performed in the [facility], was performed utilizing techniques to minimize radiation dose exposure, including the use of iterative  reconstruction and automated exposure control. IMAGE QUALITY:  Diagnostic. FINDINGS: PARENCHYMA: Decreased attenuation is noted in periventricular and subcortical white matter demonstrating an appearance that is statistically most likely to represent mild microangiopathic change; this appearance is similar when compared to most recent prior examination. No CT signs of acute infarction. No intracranial mass, mass effect or midline shift. No acute parenchymal hemorrhage. VENTRICLES AND EXTRA-AXIAL SPACES:  Normal for the patient's age. VISUALIZED ORBITS: Normal visualized orbits. PARANASAL SINUSES: Normal visualized paranasal sinuses. CALVARIUM AND EXTRACRANIAL SOFT TISSUES:  Normal.     Impression: No acute intracranial abnormality.  Workstation performed: [id number]

## 2023-08-17 ENCOUNTER — OFFICE VISIT (OUTPATIENT)
Dept: PODIATRY | Facility: CLINIC | Age: 63
End: 2023-08-17
Payer: MEDICARE

## 2023-08-17 VITALS
HEIGHT: 62 IN | BODY MASS INDEX: 33.13 KG/M2 | DIASTOLIC BLOOD PRESSURE: 80 MMHG | HEART RATE: 78 BPM | WEIGHT: 180 LBS | SYSTOLIC BLOOD PRESSURE: 135 MMHG

## 2023-08-17 DIAGNOSIS — I73.9 PERIPHERAL VASCULAR DISEASE, UNSPECIFIED (HCC): ICD-10-CM

## 2023-08-17 DIAGNOSIS — B35.1 ONYCHOMYCOSIS: Primary | ICD-10-CM

## 2023-08-17 PROCEDURE — 99212 OFFICE O/P EST SF 10 MIN: CPT | Performed by: PODIATRIST

## 2023-08-17 NOTE — PROGRESS NOTES
Patient presents for palliative foot care. No acute disorder noted. Patient has healed left fourth toe amputation without incident and is comfortable. All toenails are elongated and dystrophic. Treatment consisted of nail trimming. Patient is rescheduled for nail care in 3 months.

## 2023-08-22 ENCOUNTER — APPOINTMENT (OUTPATIENT)
Dept: RADIOLOGY | Age: 63
End: 2023-08-22
Payer: COMMERCIAL

## 2023-08-22 ENCOUNTER — OFFICE VISIT (OUTPATIENT)
Dept: INTERNAL MEDICINE CLINIC | Facility: CLINIC | Age: 63
End: 2023-08-22
Payer: COMMERCIAL

## 2023-08-22 ENCOUNTER — TELEPHONE (OUTPATIENT)
Dept: INTERNAL MEDICINE CLINIC | Facility: CLINIC | Age: 63
End: 2023-08-22

## 2023-08-22 VITALS
SYSTOLIC BLOOD PRESSURE: 134 MMHG | HEIGHT: 62 IN | BODY MASS INDEX: 33.93 KG/M2 | WEIGHT: 184.4 LBS | OXYGEN SATURATION: 97 % | DIASTOLIC BLOOD PRESSURE: 76 MMHG | HEART RATE: 94 BPM

## 2023-08-22 DIAGNOSIS — R05.1 ACUTE COUGH: ICD-10-CM

## 2023-08-22 DIAGNOSIS — R05.1 ACUTE COUGH: Primary | ICD-10-CM

## 2023-08-22 PROCEDURE — 99214 OFFICE O/P EST MOD 30 MIN: CPT | Performed by: INTERNAL MEDICINE

## 2023-08-22 PROCEDURE — 71046 X-RAY EXAM CHEST 2 VIEWS: CPT

## 2023-08-22 RX ORDER — BENZONATATE 100 MG/1
100 CAPSULE ORAL 3 TIMES DAILY PRN
Qty: 30 CAPSULE | Refills: 5 | Status: SHIPPED | OUTPATIENT
Start: 2023-08-22

## 2023-08-22 NOTE — PROGRESS NOTES
Name: Rachell Balderas      : 1960      MRN: [id number]  Encounter Provider: Shelly Cotsa MD  Encounter Date: 2023   Encounter department: [facility] [address]. Acute cough  Assessment & Plan:  Lung exam ok, pt not hypoxic, will check chest x-ray. Orders:  -     XR chest pa & lateral; Future; Expected date: 2023           Subjective     Pt has been coughing more recently. No fevers or chills. No hemoptysis, no significant SOB, no pleuritic pain. Review of Systems   Constitutional: Negative for chills and fever. HENT: Negative for sore throat. Respiratory: Positive for cough. Negative for shortness of breath.         Past Medical History:   Diagnosis Date   • Anemia    • Anxiety    • Arthritis    • Back pain at L4-L5 level    • Schreiber esophagus    • Bulimia    • Colon polyp    • Disease of thyroid gland     hypothyroid   • GERD (gastroesophageal reflux disease)    • Hearing loss in left ear    • History of transfusion    • Hyperlipidemia    • Hypertension    • Hypothyroidism    • Leukopenia    • NMS (neuroleptic malignant syndrome) 2020   • Pneumonia    • RA (rheumatoid arthritis) ([address])     in feet   • Sciatica    • Seizure ([address])     due to medication mix up 2018 only one historically   • Skin spots, red     lower right arm - poss from cat- no s/s infection   • Synovial cyst of lumbar spine    • Vertigo    • Wears dentures     full set   • Weight gain      Past Surgical History:   Procedure Laterality Date   • BONE MARROW BIOPSY     • BREAST SURGERY      enlargement with implants   • CLOSED REDUCTION DISTAL FEMUR FRACTURE     • COLONOSCOPY     • COSMETIC SURGERY     • DENTAL SURGERY     • HIP ARTHROPLASTY Right 2020    Procedure: REVISION TOTAL HIP ARTHROPLASTY WITH REPAIR OF PARIPROSTHETIC FRACTURE - POSTERIOR APPROACH;  Surgeon: Varinder Luis MD;  [facility];  Service: Orthopedics   • AR AMPUTATION METATARSAL W/TOE SINGLE Left 04/07/2023    Procedure: AMPUTATION TOE 4th;  Surgeon: Jaskaran Dumont DPM;  Location: [facility] OR;  Service: Podiatry   • FL ARTHRP ACETBLR/PROX FEM PROSTC AGRFT/ALGRFT Right 12/11/2019    Procedure: ARTHROPLASTY HIP TOTAL ANTERIOR;  Surgeon: Shruthi Stafford MD;  [facility];  Service: Orthopedics   • FL ESOPHAGOGASTRODUODENOSCOPY TRANSORAL DIAGNOSTIC N/A 05/11/2021    Procedure: ESOPHAGOGASTRODUODENOSCOPY (EGD); Surgeon: Thao Jimenez MD;  [facility];  Service: General   • FL LAPS RPR PARAESPHGL HRNA INCL FUNDPLSTY [address] N/A 05/11/2021    Procedure: REPAIR HERNIA PARAESOPHAGEAL  LAPAROSCOPIC;  Surgeon:  Thao Jimenez MD;  [facility];  Service: General   • FL UNLISTED PROCEDURE ESOPHAGUS N/A 05/11/2021    Procedure: FUNDOPLICATION TRANSORAL INCISIONLESS;  Surgeon: Jaylyn Shah MD;  [facility];  Service: Gastroenterology   • RHINOPLASTY     • SINUS SURGERY     • TOE AMPUTATION Left     2023 4th toe   • TRANSORAL INCISIONLESS FUNDOPLICATION (TIF)  04/02/8232     Family History   Problem Relation Age of Onset   • Uterine cancer Mother    • Esophageal cancer Father    • Colon cancer Maternal Grandmother      Social History     Socioeconomic History   • Marital status: Single     Spouse name: None   • Number of children: None   • Years of education: None   • Highest education level: None   Occupational History   • None   Tobacco Use   • Smoking status: Never   • Smokeless tobacco: Never   Vaping Use   • Vaping Use: Never used   Substance and Sexual Activity   • Alcohol use: Not Currently   • Drug use: Not Currently   • Sexual activity: Not Currently   Other Topics Concern   • None   Social History Narrative    Family disruption death of family member parent, per allscripts     Social Determinants of Health     Financial Resource Strain: Medium Risk (1/4/2023)    Overall Financial Resource Strain (CARDIA)    • Difficulty of Paying Living Expenses: Somewhat hard   Food Insecurity: No Food Insecurity (12/20/2022)    Hunger Vital Sign    • Worried About Running Out of Food in the Last Year: Never true    • Ran Out of Food in the Last Year: Never true   Transportation Needs: No Transportation Needs (1/4/2023)    PRAPARE - Transportation    • Lack of Transportation (Medical): No    • Lack of Transportation (Non-Medical): No   Physical Activity: Not on file   Stress: Stress Concern Present (5/18/2021)    [facility]    • Feeling of Stress : To some extent   Social Connections: Not on file   Intimate Partner Violence: Not on file   Housing Stability: Low Risk  (12/20/2022)    Housing Stability Vital Sign    • Unable to Pay for Housing in the Last Year: No    • Number of Places Lived in the Last Year: 1    • Unstable Housing in the Last Year: No     Current Outpatient Medications on File Prior to Visit   Medication Sig   • amLODIPine (NORVASC) 5 mg tablet TAKE 1 TABLET (5 MG TOTAL) BY MOUTH DAILY.    • celecoxib (CeleBREX) 200 mg capsule Take 1 capsule (200 mg total) by mouth daily   • ferrous sulfate 325 (65 Fe) mg tablet Take 1 tablet (325 mg total) by mouth 2 (two) times a day with meals   • folic acid (FOLVITE) 711 mcg tablet Take 1 tablet (400 mcg total) by mouth daily   • levothyroxine 25 mcg tablet TAKE 1 TABLET BY MOUTH EVERY DAY   • LORazepam (ATIVAN) 2 mg tablet Take 1 tablet (2 mg total) by mouth every 6 (six) hours as needed for anxiety   • ondansetron (ZOFRAN) 4 mg tablet Take 1 tablet (4 mg total) by mouth every 8 (eight) hours as needed for nausea or vomiting   • pantoprazole (PROTONIX) 40 mg tablet Take 1 tablet (40 mg total) by mouth 2 (two) times a day   • PARoxetine (PAXIL) 30 mg tablet Take 2 tablets (60 mg total) by mouth in the morning   • QUEtiapine (SEROquel XR) 400 mg 24 hr tablet Take 1 tablet (400 mg total) by mouth daily at bedtime   • sucralfate (CARAFATE) 1 g tablet TAKE 1 TABLET (1 G TOTAL) BY MOUTH 3 (THREE) TIMES A DAY WITH MEALS   • triamcinolone (KENALOG) 0.1 % cream APPLY TOPICALLY 2 TIMES A DAY AS NEEDED FOR RASH. • vitamin B-12 (VITAMIN B-12) 1,000 mcg tablet Take 1 tablet (1,000 mcg total) by mouth daily   • ziprasidone (GEODON) 80 mg capsule TAKE 1 CAPSULE BY MOUTH EVERY DAY   • lidocaine (Lidoderm) 5 % Apply 1 patch topically over 12 hours daily Remove & Discard patch within 12 hours or as directed by MD (Patient not taking: Reported on 8/15/2023)     Allergies   Allergen Reactions   • Risperdal [Risperidone] Shortness Of Breath     Rapid heart beat, SOB   • Zyprexa [Olanzapine] Shortness Of Breath     Rapid heartbeat   • Latex Rash     Band aids, adhesives wears normal underwear, can eat bananas  USE PAPER TAPE     Immunization History   Administered Date(s) Administered   • COVID-19 PFIZER VACCINE 0.3 ML IM 03/20/2021, 04/10/2021, 10/09/2021   • H1N1, All Formulations 11/17/2009   • INFLUENZA 11/17/2009, 01/04/2013, 11/11/2014, 10/20/2015, 10/31/2016, 09/16/2018   • Influenza Injectable, MDCK, Preservative Free, Quadrivalent, 0.5 mL 09/26/2019   • Influenza Quadrivalent, 6-35 Months IM 10/31/2016   • Influenza, injectable, quadrivalent, preservative free 0.5 mL 09/05/2020   • Influenza, recombinant, quadrivalent,injectable, preservative free 11/04/2021   • Pneumococcal Conjugate 13-Valent 09/16/2018   • Rabies 07/29/2014, 08/01/2014, 08/05/2014   • Tdap 07/29/2014, 12/17/2022       Objective     /76 (BP Location: Left arm, Patient Position: Sitting, Cuff Size: Standard)   Pulse 94   Ht 5' 2" (1.575 m)   Wt 83.6 kg (184 lb 6.4 oz)   SpO2 97%   BMI 33.73 kg/m²     Physical Exam  Constitutional:       General: She is not in acute distress. Appearance: Normal appearance. Pulmonary:      Effort: Pulmonary effort is normal. No respiratory distress. Breath sounds: Normal breath sounds. No wheezing, rhonchi or rales. Neurological:      Mental Status: She is alert. Alma Kuo MD

## 2023-08-22 NOTE — PATIENT INSTRUCTIONS
Problem List Items Addressed This Visit          Other    Acute cough - Primary     Lung exam ok, pt not hypoxic, will check chest x-ray.          Relevant Orders    XR chest pa & lateral

## 2023-08-22 NOTE — TELEPHONE ENCOUNTER
Patient is asking what you can recommend for her cough. States it is very persistent.         Please advise

## 2023-08-30 ENCOUNTER — EVALUATION (OUTPATIENT)
Dept: PHYSICAL THERAPY | Age: 63
End: 2023-08-30
Payer: COMMERCIAL

## 2023-08-30 VITALS — SYSTOLIC BLOOD PRESSURE: 112 MMHG | DIASTOLIC BLOOD PRESSURE: 62 MMHG | HEART RATE: 73 BPM

## 2023-08-30 DIAGNOSIS — R42 VERTIGO: ICD-10-CM

## 2023-08-30 DIAGNOSIS — R42 DIZZINESS: ICD-10-CM

## 2023-08-30 DIAGNOSIS — R26.9 ABNORMALITY OF GAIT: Primary | ICD-10-CM

## 2023-08-30 PROCEDURE — 97140 MANUAL THERAPY 1/> REGIONS: CPT

## 2023-08-30 PROCEDURE — 97162 PT EVAL MOD COMPLEX 30 MIN: CPT
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
## 2023-09-01 ENCOUNTER — TELEPHONE (OUTPATIENT)
Dept: INTERNAL MEDICINE CLINIC | Facility: CLINIC | Age: 63
End: 2023-09-01
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
## 2023-09-01 DIAGNOSIS — R51.9 NONINTRACTABLE HEADACHE, UNSPECIFIED CHRONICITY PATTERN, UNSPECIFIED HEADACHE TYPE: ICD-10-CM

## 2023-09-01 DIAGNOSIS — R41.82 ALTERED MENTAL STATUS, UNSPECIFIED ALTERED MENTAL STATUS TYPE: ICD-10-CM

## 2023-09-01 DIAGNOSIS — R42 DIZZINESS: Primary | ICD-10-CM

## 2023-09-07 ENCOUNTER — OFFICE VISIT (OUTPATIENT)
Dept: PHYSICAL THERAPY | Age: 63
End: 2023-09-07
Payer: COMMERCIAL

## 2023-09-07 DIAGNOSIS — R26.9 ABNORMALITY OF GAIT: Primary | ICD-10-CM

## 2023-09-07 DIAGNOSIS — R42 DIZZINESS: ICD-10-CM

## 2023-09-07 PROCEDURE — 97750 PHYSICAL PERFORMANCE TEST: CPT

## 2023-09-07 PROCEDURE — 97140 MANUAL THERAPY 1/> REGIONS: CPT

## 2023-09-07 PROCEDURE — 97530 THERAPEUTIC ACTIVITIES: CPT

## 2023-09-07 PROCEDURE — 97112 NEUROMUSCULAR REEDUCATION: CPT
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
## 2023-09-09 ENCOUNTER — APPOINTMENT (INPATIENT)
Dept: RADIOLOGY | Facility: HOSPITAL | Age: 63
End: 2023-09-09
Payer: COMMERCIAL

## 2023-09-09 ENCOUNTER — HOSPITAL ENCOUNTER (INPATIENT)
Facility: HOSPITAL | Age: 63
LOS: 2 days | Discharge: HOME | End: 2023-09-11
Attending: EMERGENCY MEDICINE | Admitting: GENERAL PRACTICE
Payer: COMMERCIAL

## 2023-09-09 DIAGNOSIS — R42 LIGHTHEADEDNESS: Primary | ICD-10-CM

## 2023-09-09 DIAGNOSIS — E87.1 HYPONATREMIA: ICD-10-CM

## 2023-09-09 PROBLEM — F07.81 POST CONCUSSION SYNDROME: Status: ACTIVE | Noted: 2023-09-09

## 2023-09-09 LAB
ALBUMIN SERPL BCP-MCNC: 3.9 G/DL (ref 3.5–5)
ALP SERPL-CCNC: 67 U/L (ref 34–104)
ALT SERPL W P-5'-P-CCNC: 18 U/L (ref 7–52)
ANION GAP SERPL CALCULATED.3IONS-SCNC: 5 MMOL/L
ANION GAP SERPL CALCULATED.3IONS-SCNC: 5 MMOL/L
AST SERPL W P-5'-P-CCNC: 16 U/L (ref 13–39)
BASOPHILS # BLD AUTO: 0.03 THOUSANDS/ÂΜL (ref 0–0.1)
BASOPHILS NFR BLD AUTO: 1 % (ref 0–1)
BILIRUB SERPL-MCNC: 0.3 MG/DL (ref 0.2–1)
BUN SERPL-MCNC: 9 MG/DL (ref 5–25)
BUN SERPL-MCNC: 9 MG/DL (ref 5–25)
CALCIUM SERPL-MCNC: 9 MG/DL (ref 8.4–10.2)
CALCIUM SERPL-MCNC: 9.7 MG/DL (ref 8.4–10.2)
CARDIAC TROPONIN I PNL SERPL HS: <2 NG/L
CHLORIDE SERPL-SCNC: 92 MMOL/L (ref 96–108)
CHLORIDE SERPL-SCNC: 95 MMOL/L (ref 96–108)
CHLORIDE UR-SCNC: <15 MMOL/L
CO2 SERPL-SCNC: 25 MMOL/L (ref 21–32)
CO2 SERPL-SCNC: 28 MMOL/L (ref 21–32)
CORTIS SERPL-MCNC: 4.6 UG/DL
CREAT SERPL-MCNC: 0.64 MG/DL (ref 0.6–1.3)
CREAT SERPL-MCNC: 0.84 MG/DL (ref 0.6–1.3)
EOSINOPHIL # BLD AUTO: 0.24 THOUSAND/ÂΜL (ref 0–0.61)
EOSINOPHIL NFR BLD AUTO: 6 % (ref 0–6)
ERYTHROCYTE [DISTWIDTH] IN BLOOD BY AUTOMATED COUNT: 14.5 % (ref 11.6–15.1)
GFR SERPL CREATININE-BSD FRML MDRD: 74 ML/MIN/1.73SQ M
GFR SERPL CREATININE-BSD FRML MDRD: 95 ML/MIN/1.73SQ M
GLUCOSE SERPL-MCNC: 111 MG/DL (ref 65–140)
GLUCOSE SERPL-MCNC: 87 MG/DL (ref 65–140)
HCT VFR BLD AUTO: 36 % (ref 34.8–46.1)
HGB BLD-MCNC: 12.1 G/DL (ref 11.5–15.4)
IMM GRANULOCYTES # BLD AUTO: 0.01 THOUSAND/UL (ref 0–0.2)
IMM GRANULOCYTES NFR BLD AUTO: 0 % (ref 0–2)
LYMPHOCYTES # BLD AUTO: 0.69 THOUSANDS/ÂΜL (ref 0.6–4.47)
LYMPHOCYTES NFR BLD AUTO: 18 % (ref 14–44)
MCH RBC QN AUTO: 28.1 PG (ref 26.8–34.3)
MCHC RBC AUTO-ENTMCNC: 33.6 G/DL (ref 31.4–37.4)
MCV RBC AUTO: 84 FL (ref 82–98)
MONOCYTES # BLD AUTO: 0.37 THOUSAND/ÂΜL (ref 0.17–1.22)
MONOCYTES NFR BLD AUTO: 10 % (ref 4–12)
NEUTROPHILS # BLD AUTO: 2.5 THOUSANDS/ÂΜL (ref 1.85–7.62)
NEUTS SEG NFR BLD AUTO: 65 % (ref 43–75)
NRBC BLD AUTO-RTO: 0 /100 WBCS
OSMOLALITY UR/SERPL-RTO: 257 MMOL/KG (ref 282–298)
OSMOLALITY UR: 92 MMOL/KG
PLATELET # BLD AUTO: 236 THOUSANDS/UL (ref 149–390)
PMV BLD AUTO: 8.4 FL (ref 8.9–12.7)
POTASSIUM SERPL-SCNC: 3.4 MMOL/L (ref 3.5–5.3)
POTASSIUM SERPL-SCNC: 3.6 MMOL/L (ref 3.5–5.3)
PROT SERPL-MCNC: 6.3 G/DL (ref 6.4–8.4)
RBC # BLD AUTO: 4.31 MILLION/UL (ref 3.81–5.12)
SODIUM 24H UR-SCNC: <10 MOL/L
SODIUM SERPL-SCNC: 122 MMOL/L (ref 135–147)
SODIUM SERPL-SCNC: 128 MMOL/L (ref 135–147)
TSH SERPL DL<=0.05 MIU/L-ACNC: 2.01 UIU/ML (ref 0.45–4.5)
WBC # BLD AUTO: 3.84 THOUSAND/UL (ref 4.31–10.16)

## 2023-09-09 PROCEDURE — 85025 COMPLETE CBC W/AUTO DIFF WBC: CPT | Performed by: EMERGENCY MEDICINE

## 2023-09-09 PROCEDURE — 93005 ELECTROCARDIOGRAM TRACING: CPT

## 2023-09-09 PROCEDURE — 84300 ASSAY OF URINE SODIUM: CPT

## 2023-09-09 PROCEDURE — 36415 COLL VENOUS BLD VENIPUNCTURE: CPT | Performed by: EMERGENCY MEDICINE

## 2023-09-09 PROCEDURE — 71046 X-RAY EXAM CHEST 2 VIEWS: CPT

## 2023-09-09 PROCEDURE — 84443 ASSAY THYROID STIM HORMONE: CPT

## 2023-09-09 PROCEDURE — 80048 BASIC METABOLIC PNL TOTAL CA: CPT | Performed by: GENERAL PRACTICE

## 2023-09-09 PROCEDURE — 83930 ASSAY OF BLOOD OSMOLALITY: CPT

## 2023-09-09 PROCEDURE — 84484 ASSAY OF TROPONIN QUANT: CPT | Performed by: EMERGENCY MEDICINE

## 2023-09-09 PROCEDURE — 99285 EMERGENCY DEPT VISIT HI MDM: CPT | Performed by: EMERGENCY MEDICINE

## 2023-09-09 PROCEDURE — 99285 EMERGENCY DEPT VISIT HI MDM: CPT

## 2023-09-09 PROCEDURE — 83935 ASSAY OF URINE OSMOLALITY: CPT

## 2023-09-09 PROCEDURE — 99223 1ST HOSP IP/OBS HIGH 75: CPT | Performed by: GENERAL PRACTICE

## 2023-09-09 PROCEDURE — 82436 ASSAY OF URINE CHLORIDE: CPT | Performed by: GENERAL PRACTICE

## 2023-09-09 PROCEDURE — 82533 TOTAL CORTISOL: CPT

## 2023-09-09 PROCEDURE — 80053 COMPREHEN METABOLIC PANEL: CPT | Performed by: EMERGENCY MEDICINE

## 2023-09-09 RX ORDER — MAGNESIUM SULFATE HEPTAHYDRATE 40 MG/ML
2 INJECTION, SOLUTION INTRAVENOUS ONCE
Status: COMPLETED | OUTPATIENT
Start: 2023-09-09 | End: 2023-09-09

## 2023-09-09 RX ORDER — SUCRALFATE 1 G/1
1 TABLET ORAL
Status: DISCONTINUED | OUTPATIENT
Start: 2023-09-09 | End: 2023-09-11 | Stop reason: HOSPADM

## 2023-09-09 RX ORDER — LANOLIN ALCOHOL/MO/W.PET/CERES
400 CREAM (GRAM) TOPICAL DAILY
Status: DISCONTINUED | OUTPATIENT
Start: 2023-09-10 | End: 2023-09-11 | Stop reason: HOSPADM

## 2023-09-09 RX ORDER — AMLODIPINE BESYLATE 5 MG/1
5 TABLET ORAL DAILY
Status: DISCONTINUED | OUTPATIENT
Start: 2023-09-10 | End: 2023-09-11 | Stop reason: HOSPADM

## 2023-09-09 RX ORDER — PANTOPRAZOLE SODIUM 40 MG/1
40 TABLET, DELAYED RELEASE ORAL
Status: DISCONTINUED | OUTPATIENT
Start: 2023-09-09 | End: 2023-09-11 | Stop reason: HOSPADM

## 2023-09-09 RX ORDER — DIPHENHYDRAMINE HYDROCHLORIDE 50 MG/ML
25 INJECTION INTRAMUSCULAR; INTRAVENOUS ONCE
Status: DISCONTINUED | OUTPATIENT
Start: 2023-09-09 | End: 2023-09-11 | Stop reason: HOSPADM

## 2023-09-09 RX ORDER — TRIAMCINOLONE ACETONIDE 1 MG/G
CREAM TOPICAL 2 TIMES DAILY PRN
Status: DISCONTINUED | OUTPATIENT
Start: 2023-09-09 | End: 2023-09-11 | Stop reason: HOSPADM

## 2023-09-09 RX ORDER — ZIPRASIDONE HYDROCHLORIDE 40 MG/1
80 CAPSULE ORAL DAILY
Status: DISCONTINUED | OUTPATIENT
Start: 2023-09-10 | End: 2023-09-11 | Stop reason: HOSPADM

## 2023-09-09 RX ORDER — PAROXETINE HYDROCHLORIDE 20 MG/1
60 TABLET, FILM COATED ORAL DAILY
Status: DISCONTINUED | OUTPATIENT
Start: 2023-09-10 | End: 2023-09-11 | Stop reason: HOSPADM

## 2023-09-09 RX ORDER — FERROUS SULFATE 325(65) MG
325 TABLET ORAL 2 TIMES DAILY WITH MEALS
Status: DISCONTINUED | OUTPATIENT
Start: 2023-09-09 | End: 2023-09-11 | Stop reason: HOSPADM

## 2023-09-09 RX ORDER — ACETAMINOPHEN 325 MG/1
650 TABLET ORAL EVERY 6 HOURS PRN
Status: DISCONTINUED | OUTPATIENT
Start: 2023-09-09 | End: 2023-09-11 | Stop reason: HOSPADM

## 2023-09-09 RX ORDER — METOCLOPRAMIDE HYDROCHLORIDE 5 MG/ML
10 INJECTION INTRAMUSCULAR; INTRAVENOUS ONCE
Status: COMPLETED | OUTPATIENT
Start: 2023-09-09 | End: 2023-09-09

## 2023-09-09 RX ORDER — LEVOTHYROXINE SODIUM 0.03 MG/1
25 TABLET ORAL
Status: DISCONTINUED | OUTPATIENT
Start: 2023-09-10 | End: 2023-09-11 | Stop reason: HOSPADM

## 2023-09-09 RX ORDER — QUETIAPINE 200 MG/1
400 TABLET, FILM COATED, EXTENDED RELEASE ORAL
Status: DISCONTINUED | OUTPATIENT
Start: 2023-09-09 | End: 2023-09-11 | Stop reason: HOSPADM

## 2023-09-09 RX ORDER — CELECOXIB 200 MG/1
200 CAPSULE ORAL DAILY
Status: DISCONTINUED | OUTPATIENT
Start: 2023-09-10 | End: 2023-09-11 | Stop reason: HOSPADM

## 2023-09-09 RX ORDER — KETOROLAC TROMETHAMINE 30 MG/ML
15 INJECTION, SOLUTION INTRAMUSCULAR; INTRAVENOUS ONCE
Status: DISCONTINUED | OUTPATIENT
Start: 2023-09-09 | End: 2023-09-09

## 2023-09-09 RX ORDER — LORAZEPAM 1 MG/1
2 TABLET ORAL EVERY 6 HOURS PRN
Status: DISCONTINUED | OUTPATIENT
Start: 2023-09-09 | End: 2023-09-11 | Stop reason: HOSPADM

## 2023-09-09 RX ORDER — ONDANSETRON 2 MG/ML
4 INJECTION INTRAMUSCULAR; INTRAVENOUS ONCE
Status: DISCONTINUED | OUTPATIENT
Start: 2023-09-09 | End: 2023-09-11 | Stop reason: HOSPADM

## 2023-09-09 RX ORDER — ENOXAPARIN SODIUM 100 MG/ML
40 INJECTION SUBCUTANEOUS DAILY
Status: DISCONTINUED | OUTPATIENT
Start: 2023-09-09 | End: 2023-09-11 | Stop reason: HOSPADM

## 2023-09-09 RX ADMIN — ENOXAPARIN SODIUM 40 MG: 40 INJECTION SUBCUTANEOUS at 17:41

## 2023-09-09 RX ADMIN — MAGNESIUM SULFATE HEPTAHYDRATE 2 G: 40 INJECTION, SOLUTION INTRAVENOUS at 18:26

## 2023-09-09 RX ADMIN — QUETIAPINE FUMARATE 400 MG: 200 TABLET, EXTENDED RELEASE ORAL at 21:01

## 2023-09-09 RX ADMIN — METOCLOPRAMIDE 10 MG: 5 INJECTION, SOLUTION INTRAMUSCULAR; INTRAVENOUS at 17:04

## 2023-09-09 NOTE — TREATMENT PLAN
Admit sNa 122. Renal consulted for hyponatremia. Per chart review, very low urine osm noted - suggestive of polydipsia. Restrict to 1.2L/day and repeat serial BMP. Goal sNa by morning 128. Have d/w SLIM attending via TT. Cerebex is an NSAID. NSAIDS are also a cause of hyponatremia. Have discontinued toradol order. It is an NSAID. Paxil could also contribute (causes SIADH). Formal consult to follow tomorrow.

## 2023-09-09 NOTE — ED PROVIDER NOTES
Chief Complaint   Patient presents with   • Dizziness     Got a concussion in July of this year. Being treated for post concussion syndrome. Pt reports dizziness, HA, blurred vision at times. Denies CP or SOB      History of Present Illness and Review of Systems   This is a 61 y.o. female with PMH significant for rheumatoid arthritis, hypertension, hyperlipidemia, schizoaffective on multiple antipsychotics coming in today with complaint of lightheadedness. Her symptoms have been longstanding. Reports that ever since she had a concussion diagnosed in July she has been feeling nauseated, waxing waning headache, generalized fatigue and lightheadedness. She reports her symptoms are worse today. Denies any new inciting event or injury. She had 1 episode of vomiting recently. Denies any decreased p.o. intake, abdominal pain. No fevers chills. Denies any chest pain or shortness of breath. No difficulties ambulating. No anticoagulant or antiplatelet use. She has been following up with physical therapy, is due for an MRI of her brain. Otherwise denies any dysarthria, dysphagia, difficulties ambulating, paresthesias or otherwise. She called EMS because her symptoms have been worsening. She was hospitalized in July for concerns of failure and pneumonia and acute hypoxic respiratory failure. No other symptoms currently. - No language barrier. No other complaints for this encounter.     Remainder of ROS Reviewed and Non-Pertinent    Past Medical, Past Surgical History:    has a past medical history of Anemia, Anxiety, Arthritis, Back pain at L4-L5 level, Schrieber esophagus, Bulimia, Colon polyp, Disease of thyroid gland, GERD (gastroesophageal reflux disease), Hearing loss in left ear, History of transfusion, Hyperlipidemia, Hypertension, Hypothyroidism, Leukopenia, NMS (neuroleptic malignant syndrome) (01/03/2020), Pneumonia, RA (rheumatoid arthritis) ([address]), Sciatica, Seizure ([address]), Skin spots, red, Synovial cyst of lumbar spine, Vertigo, Wears dentures, and Weight gain. has a past surgical history that includes Rhinoplasty; Bone marrow biopsy; Colonoscopy; pr arthrp acetblr/prox fem prostc agrft/algrft (Right, 12/11/2019); Hip Arthroplasty (Right, 01/02/2020); Sinus surgery; Cosmetic surgery; Closed reduction distal femur fracture; pr laps rpr paraesphgl hrna incl fundplsty w/mesh (N/A, 05/11/2021); pr esophagogastroduodenoscopy transoral diagnostic (N/A, 05/11/2021); pr unlisted procedure esophagus (N/A, 05/11/2021); Transoral incisionless fundoplication (TIF) (39/57/0406); Breast surgery; Dental surgery; pr amputation metatarsal w/toe single (Left, 04/07/2023); and Toe amputation (Left). Allergies: Allergies   Allergen Reactions   • Risperdal [Risperidone] Shortness Of Breath     Rapid heart beat, SOB   • Zyprexa [Olanzapine] Shortness Of Breath     Rapid heartbeat   • Latex Rash     Band aids, adhesives wears normal underwear, can eat bananas  USE PAPER TAPE       Social and Family History:     Social History     Substance and Sexual Activity   Alcohol Use Not Currently     Social History     Tobacco Use   Smoking Status Never   Smokeless Tobacco Never     Social History     Substance and Sexual Activity   Drug Use Not Currently       Physical Examination     Vitals:    09/09/23 1243 09/09/23 1500 09/09/23 1732 09/09/23 1735   BP:  114/71 126/87 126/87   BP Location:  Right arm  Left arm   Pulse:  72 72 72   Resp:  20 14 14   Temp: 98.2 °F (36.8 °C)  (!) 97.3 °F (36.3 °C) (!) 97.3 °F (36.3 °C)   TempSrc: Oral   Oral   SpO2:  94% 94%    Weight:    82.1 kg (181 lb)   Height:    5' 2" (1.575 m)       Physical Exam  Vitals and nursing note reviewed. Constitutional:       General: She is not in acute distress. Appearance: She is well-developed. HENT:      Head: Normocephalic and atraumatic.    Eyes:      Conjunctiva/sclera: Conjunctivae normal.   Cardiovascular:      Rate and Rhythm: Normal rate and regular rhythm. Heart sounds: No murmur heard. Pulmonary:      Effort: Pulmonary effort is normal. No respiratory distress. Breath sounds: Normal breath sounds. Abdominal:      Palpations: Abdomen is soft. Tenderness: There is no abdominal tenderness. There is no guarding or rebound. Musculoskeletal:         General: No swelling. Cervical back: Neck supple. Skin:     General: Skin is warm and dry. Capillary Refill: Capillary refill takes less than 2 seconds. Neurological:      General: No focal deficit present. Mental Status: She is alert. Cranial Nerves: No cranial nerve deficit. Sensory: No sensory deficit. Motor: No weakness. Deep Tendon Reflexes: Reflexes normal.   Psychiatric:         Mood and Affect: Mood normal.           Risk Stratification Tools                Orders Placed This Encounter   Procedures   • XR chest 2 views   • CBC and differential   • Comprehensive metabolic panel   • HS Troponin 0hr (reflex protocol)   • Osmolality, urine   • Sodium, urine, random   • Cortisol   • Osmolality-"If this is regarding a toxic alcohol, please STOP and consult medical  for further guidance."   • TSH   • Basic metabolic panel   • Magnesium   • Phosphorus   • CBC (With Platelets)   • Chloride, urine, random   • Uric acid   • Basic metabolic panel   • Diet Regular; Regular House; Fluid Restriction 1200 ML   • Notify physician of an increase in chest pain, symptomatic hypotension, a change in cardiac rhythm, or an O2 saturation of less than 90%. • Notify physician immediately if patient has persistent Chest Pain. • Insert peripheral IV   • Continuous pulse oximetry   • Nursing communication Continue IV as ordered.    • Vital Signs per unit routine   • Up and OOB as tolerated   • Apply SCD or Foot pumps   • Level 1-Full Code: all life saving measures are indicated   • Inpatient consult to Nephrology   • PT eval and treat   • ECG 12 lead   • INPATIENT ADMISSION       Labs:     Labs Reviewed   CBC AND DIFFERENTIAL - Abnormal       Result Value Ref Range Status    WBC 3.84 (*) 4.31 - 10.16 Thousand/uL Final    RBC 4.31  3.81 - 5.12 Million/uL Final    Hemoglobin 12.1  11.5 - 15.4 g/dL Final    Hematocrit 36.0  34.8 - 46.1 % Final    MCV 84  82 - 98 fL Final    MCH 28.1  26.8 - 34.3 pg Final    MCHC 33.6  31.4 - 37.4 g/dL Final    RDW 14.5  11.6 - 15.1 % Final    MPV 8.4 (*) 8.9 - 12.7 fL Final    Platelets 847  148 - 390 Thousands/uL Final    nRBC 0  /100 WBCs Final    Neutrophils Relative 65  43 - 75 % Final    Immat GRANS % 0  0 - 2 % Final    Lymphocytes Relative 18  14 - 44 % Final    Monocytes Relative 10  4 - 12 % Final    Eosinophils Relative 6  0 - 6 % Final    Basophils Relative 1  0 - 1 % Final    Neutrophils Absolute 2.50  1.85 - 7.62 Thousands/µL Final    Immature Grans Absolute 0.01  0.00 - 0.20 Thousand/uL Final    Lymphocytes Absolute 0.69  0.60 - 4.47 Thousands/µL Final    Monocytes Absolute 0.37  0.17 - 1.22 Thousand/µL Final    Eosinophils Absolute 0.24  0.00 - 0.61 Thousand/µL Final    Basophils Absolute 0.03  0.00 - 0.10 Thousands/µL Final   COMPREHENSIVE METABOLIC PANEL - Abnormal    Sodium 122 (*) 135 - 147 mmol/L Final    Potassium 3.6  3.5 - 5.3 mmol/L Final    Chloride 92 (*) 96 - 108 mmol/L Final    CO2 25  21 - 32 mmol/L Final    ANION GAP 5  mmol/L Final    BUN 9  5 - 25 mg/dL Final    Creatinine 0.64  0.60 - 1.30 mg/dL Final    Comment: Standardized to IDMS reference method    Glucose 87  65 - 140 mg/dL Final    Comment: If the patient is fasting, the ADA then defines impaired fasting glucose as > 100 mg/dL and diabetes as > or equal to 123 mg/dL. Calcium 9.0  8.4 - 10.2 mg/dL Final    AST 16  13 - 39 U/L Final    ALT 18  7 - 52 U/L Final    Comment: Specimen collection should occur prior to Sulfasalazine administration due to the potential for falsely depressed results.      Alkaline Phosphatase 67  34 - 104 U/L Final Total Protein 6.3 (*) 6.4 - 8.4 g/dL Final    Albumin 3.9  3.5 - 5.0 g/dL Final    Total Bilirubin 0.30  0.20 - 1.00 mg/dL Final    Comment: Use of this assay is not recommended for patients undergoing treatment with eltrombopag due to the potential for falsely elevated results. N-acetyl-p-benzoquinone imine (metabolite of Acetaminophen) will generate erroneously low results in samples for patients that have taken an overdose of Acetaminophen. eGFR 95  ml/min/1.73sq m Final    Narrative:     [facility] guidelines for Chronic Kidney Disease (CKD):   •  Stage 1 with normal or high GFR (GFR > 90 mL/min/1.73 square meters)  •  Stage 2 Mild CKD (GFR = 60-89 mL/min/1.73 square meters)  •  Stage 3A Moderate CKD (GFR = 45-59 mL/min/1.73 square meters)  •  Stage 3B Moderate CKD (GFR = 30-44 mL/min/1.73 square meters)  •  Stage 4 Severe CKD (GFR = 15-29 mL/min/1.73 square meters)  •  Stage 5 End Stage CKD (GFR <15 mL/min/1.73 square meters)  Note: GFR calculation is accurate only with a steady state creatinine   OSMOLALITY, URINE - Abnormal    Osmolality, Ur 92 (*) 250 - 900 mmol/KG Final   HS TROPONIN I 0HR - Normal    hs TnI 0hr <2  "Refer to ACS Flowchart"- see link ng/L Final    Comment:                                              Initial (time 0) result  If >=50 ng/L, Myocardial injury suggested ;  Type of myocardial injury and treatment strategy  to be determined. If 5-49 ng/L, a delta result at 2 hours and or 4 hours will be needed to further evaluate. If <4 ng/L, and chest pain has been >3 hours since onset, patient may qualify for discharge based on the HEART score in the ED. If <5 ng/L and <3hours since onset of chest pain, a delta result at 2 hours will be needed to further evaluate. HS Troponin 99th Percentile URL of a Health Population=12 ng/L with a 95% Confidence Interval of 8-18 ng/L.     Second Troponin (time 2 hours)  If calculated delta >= 20 ng/L,  Myocardial injury suggested ; Type of myocardial injury and treatment strategy to be determined. If 5-49 ng/L and the calculated delta is 5-19 ng/L, consult medical service for evaluation. Continue evaluation for ischemia on ecg and other possible etiology and repeat hs troponin at 4 hours. If delta is <5 ng/L at 2 hours, consider discharge based on risk stratification via the HEART score (if in ED), or JERRY risk score in IP/Observation. HS Troponin 99th Percentile URL of a Health Population=12 ng/L with a 95% Confidence Interval of 8-18 ng/L.   TSH, 3RD GENERATION - Normal    TSH 3RD GENERATON 2.010  0.450 - 4.500 uIU/mL Final    Comment: The recommended reference ranges for TSH during pregnancy are as follows:   First trimester 0.1 to 2.5 uIU/mL   Second trimester  0.2 to 3.0 uIU/mL   Third trimester 0.3 to 3.0 uIU/m    Note: Normal ranges may not apply to patients who are transgender, non-binary, or whose legal sex, sex at birth, and gender identity differ. Adult TSH (3rd generation) reference range follows the recommended guidelines of the [employer], January, 2020. CORTISOL,RANDOM    Cortisol, Random 4.6  ug/dL Final       Imaging:     XR chest 2 views    (Results Pending)          Procedures   Procedures      MDM:   Medical Decision Making  Sam Tidwell is a 61 y.o. who presents with complaints of lightheadedness    Vital signs are unremarkable, physical exam shows patient appears at her baseline health status, no focal neurologic deficits no evidence of traumatic injury or otherwise    Ddx: Overall likely related to postconcussive syndrome. Minimal concern for recurrent traumatic injury. Additionally may be related to anemia versus dysrhythmia versus electrolyte abnormality otherwise. No evidence of fluid overload or concerns for CHF. Her abdomen is soft, reassuring against an intra-abdominal etiology. However given recent hospitalization, will obtain further work-up.     Plan: Workup will include chest x-ray, CBC, CMP, EKG, troponin, IVF, Zofran. Will monitor closely and reassess. Reassessment/Disposition: Work-up concerning for acute on chronic hyponatremia to sodium of 122. Remainder of work-up was unremarkable. 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 She remained hemodynamically stable and admitted without complication. Discussed the patient's case with the [facility] service who agreed to admit the patient for further care and evaluation. The patient was also stable throughout their ED course and suffered from no acute changes and had no further questions or concerns from the ED team's standpoint. Amount and/or Complexity of Data Reviewed  Labs: ordered. Decision-making details documented in ED Course. Radiology: ordered. Risk  Prescription drug management. Decision regarding hospitalization.          - Reviewed relevant past office visits/hospitalizations/procedures  -Obtained pertinent history that influenced decision making from the patient    ED Course as of 09/09/23 2325   Sat Sep 09, 2023   1359 Sodium(!): 122   1359 Appears new from baseline. 1407 Will order hyponatremia labs   1431 Texted [facility] for admission      Final Dispo   Final Diagnosis:  1. Lightheadedness    2. Hyponatremia      Time reflects when diagnosis was documented in both MDM as applicable and the Disposition within this note     Time User Action Codes Description Comment    9/9/2023  2:36 PM Paul Sanchez [R42] Lightheadedness     9/9/2023  2:36 PM [address] [E87.1] Hyponatremia       ED Disposition     ED Disposition   Admit    Condition   Stable    Date/Time   Sat Sep 9, 2023  2:36 PM    Comment   Case was discussed with CHERYL and the patient's admission status was agreed to be Admission Status: inpatient status to the service of Dr. Dhruv Patel .            Follow-up Information    None       Medications ondansetron (ZOFRAN) injection 4 mg (0 mg Intravenous Hold 9/9/23 1443)   diphenhydrAMINE (BENADRYL) injection 25 mg (25 mg Intravenous Not Given 9/9/23 1703)   amLODIPine (NORVASC) tablet 5 mg (has no administration in time range)   celecoxib (CeleBREX) capsule 200 mg (has no administration in time range)   ferrous sulfate tablet 325 mg (325 mg Oral Not Given 2/7/03 9374)   folic acid (FOLVITE) tablet 400 mcg (has no administration in time range)   levothyroxine tablet 25 mcg (has no administration in time range)   LORazepam (ATIVAN) tablet 2 mg (has no administration in time range)   pantoprazole (PROTONIX) EC tablet 40 mg (40 mg Oral Not Given 9/9/23 1658)   PARoxetine (PAXIL) tablet 60 mg (has no administration in time range)   QUEtiapine (SEROquel XR) 24 hr tablet 400 mg (400 mg Oral Given 9/9/23 2101)   sucralfate (CARAFATE) tablet 1 g (1 g Oral Not Given 9/9/23 1658)   triamcinolone (KENALOG) 0.1 % cream (has no administration in time range)   cyanocobalamin (VITAMIN B-12) tablet 1,000 mcg (has no administration in time range)   ziprasidone (GEODON) capsule 80 mg (has no administration in time range)   enoxaparin (LOVENOX) subcutaneous injection 40 mg (40 mg Subcutaneous Given 9/9/23 1741)   acetaminophen (TYLENOL) tablet 650 mg (has no administration in time range)   metoclopramide (REGLAN) injection 10 mg (10 mg Intravenous Given 9/9/23 1704)   magnesium sulfate 2 g/50 mL IVPB (premix) 2 g (2 g Intravenous New Bag 9/9/23 1826)       All details of the evaluation and treatment plan were made clear and additionally all questions and concerns were addressed while under my care. Portions of the record may have been created with voice recognition software. Occasional wrong word or "sound a like" substitutions may have occurred due to the inherent limitations of voice recognition software. Read the chart carefully and recognize, using context, where substitutions have occurred.     The attending physician physically available and evaluated the above patient alongside myself.       Domitila Sandoval MD  09/09/23 [id number]

## 2023-09-09 NOTE — ASSESSMENT & PLAN NOTE
· Pt had head trauma 3 months ago and had HAs and blurred vision and nausea since then. Went to PT which helped until yesterday. · C/w Celebrex daily  · Will trial 1 time dose migraine cocktail - careful w/ Toradol due to prior UGIB.   Also careful w/ Reglan due to antipsychotic use

## 2023-09-09 NOTE — H&P
[address] [facility]  H&P  Name: Venice Sandoval 61 y.o. female I MRN: [id number]  Unit/Bed#: ED 16 I Date of Admission: 9/9/2023   Date of Service: 9/9/2023 I Hospital Day: 0    Will hold off on Toradol in setting of hypoNa. If HA does not improve with Mag/Regaln/Benadryl and tylenol may need to talk to neuro as not best candidate for more Reglan either. Will make FR 1.2L. Assessment/Plan   * Hyponatremia  Assessment & Plan  · Na down to 122 from 136 2 months ago  · D/w Renal - will start w/ FR. They will see in consult tomorrow  · F/u U Na, U Cl. F/u Serum uric acid  · U Osm 92 - likely polydipsia - will see how Na improves w/ 1.5L FR. Hold off on IVF  · F/u TSH, cortisol  · Will do BMP tonight - will have renal texted if Na still downtrending of > 128        Post concussion syndrome  Assessment & Plan  · Pt had head trauma 3 months ago and had HAs and blurred vision and nausea since then. Went to PT which helped until yesterday. · C/w Celebrex daily  · Will trial 1 time dose migraine cocktail - careful w/ Toradol due to prior UGIB. Also careful w/ Reglan due to antipsychotic use    Erosive esophagitis  Assessment & Plan  · C/w PPI and Carafate    Schizoaffective disorder ([address])  Assessment & Plan  · C/w home meds         VTE Pharmacologic Prophylaxis: VTE Score: 3 Moderate Risk (Score 3-4) - Pharmacological DVT Prophylaxis Ordered: enoxaparin (Lovenox). Code Status: Level 1 - Full Code   Discussion with family: Patient declined call to . Anticipated Length of Stay: Patient will be admitted on an inpatient basis with an anticipated length of stay of greater than 2 midnights secondary to hypoNa.     Total Time Spent on Date of Encounter in care of patient: 55 minutes This time was spent on one or more of the following: performing physical exam; counseling and coordination of care; obtaining or reviewing history; documenting in the medical record; reviewing/ordering tests, medications or procedures; communicating with other healthcare professionals and discussing with patient's family/caregivers. Chief Complaint: headache and dizziness    History of Present Illness:  Yuridia Velásquez is a 61 y.o. female with a PMH of concussion 3 months ago and schizoaffective disorder on multiple psychiatric meds who presents with headache and dizziness and nausea since yesterday. Patient says this is similar to her concussion symptoms 3 months ago. At that time she had a CAT scan of her head which was normal.  Patient underwent physical therapy which she said was helping with her symptoms up until yesterday. Patient says yesterday she did not eat but did drink water. Otherwise, patient denies fevers or cough or shortness of breath. Review of Systems:  Review of Systems   Constitutional: Positive for activity change and appetite change. HENT: Negative. Eyes: Positive for photophobia and visual disturbance. Respiratory: Negative. Cardiovascular: Negative. Gastrointestinal: Positive for nausea. Endocrine: Negative. Genitourinary: Negative. Musculoskeletal: Negative. Skin: Negative. Allergic/Immunologic: Negative. Neurological: Positive for headaches. Hematological: Negative. Psychiatric/Behavioral: Negative.         Past Medical and Surgical History:   Past Medical History:   Diagnosis Date   • Anemia    • Anxiety    • Arthritis    • Back pain at L4-L5 level    • Schreiber esophagus    • Bulimia    • Colon polyp    • Disease of thyroid gland     hypothyroid   • GERD (gastroesophageal reflux disease)    • Hearing loss in left ear    • History of transfusion    • Hyperlipidemia    • Hypertension    • Hypothyroidism    • Leukopenia    • NMS (neuroleptic malignant syndrome) 01/03/2020   • Pneumonia    • RA (rheumatoid arthritis) ([address])     in feet   • Sciatica    • Seizure ([address])     due to medication mix up 8/2018 only one historically • Skin spots, red     lower right arm - poss from cat- no s/s infection   • Synovial cyst of lumbar spine    • Vertigo    • Wears dentures     full set   • Weight gain        Past Surgical History:   Procedure Laterality Date   • BONE MARROW BIOPSY     • BREAST SURGERY      enlargement with implants   • CLOSED REDUCTION DISTAL FEMUR FRACTURE     • COLONOSCOPY     • COSMETIC SURGERY     • DENTAL SURGERY     • HIP ARTHROPLASTY Right 01/02/2020    Procedure: REVISION TOTAL HIP ARTHROPLASTY WITH REPAIR OF PARIPROSTHETIC FRACTURE - POSTERIOR APPROACH;  Surgeon: Delilah Grande MD;  [facility] MA[facility];  Service: Orthopedics   • DC AMPUTATION METATARSAL W/TOE SINGLE Left 04/07/2023    Procedure: AMPUTATION TOE 4th;  Surgeon: Rena Tinoco DPM;  Location: [address] [place] [address] MAIN OR;  Service: Podiatry   • DC ARTHRP ACETBLR/PROX FEM PROSTC AGRFT/ALGRFT Right 12/11/2019    Procedure: ARTHROPLASTY HIP TOTAL ANTERIOR;  Surgeon: Delilah Grande MD;  Location: [facility] MA[facility];  Service: Orthopedics   • DC ESOPHAGOGASTRODUODENOSCOPY TRANSORAL DIAGNOSTIC N/A 05/11/2021    Procedure: ESOPHAGOGASTRODUODENOSCOPY (EGD); Surgeon: Gaston Marquis MD;  Location: [facility] MA[facility];  Service: General   • DC LAPS RPR PARAESPHGL HRNA INCL FUNDPLSTY [address] N/A 05/11/2021    Procedure: REPAIR HERNIA PARAESOPHAGEAL  LAPAROSCOPIC;  Surgeon: Gaston Marquis MD;  [facility];  Service: General   • DC UNLISTED PROCEDURE ESOPHAGUS N/A 05/11/2021    Procedure: FUNDOPLICATION TRANSORAL INCISIONLESS;  Surgeon: Catina Escalera MD;  [facility] MA[facility];  Service: Gastroenterology   • RHINOPLASTY     • SINUS SURGERY     • TOE AMPUTATION Left     2023 4th toe   • TRANSORAL INCISIONLESS FUNDOPLICATION (TIF)  45/29/3225       Meds/Allergies:  Prior to Admission medications    Medication Sig Start Date End Date Taking? Authorizing Provider   amLODIPine (NORVASC) 5 mg tablet TAKE 1 TABLET (5 MG TOTAL) BY MOUTH DAILY.  1/24/23   Miles Ge MD   [facility] PERLES) 100 mg capsule Take 1 capsule (100 mg total) by mouth 3 (three) times a day as needed for cough 8/22/23   Gardenia Molina MD   celecoxib (CeleBREX) 200 mg capsule Take 1 capsule (200 mg total) by mouth daily 8/8/23   Gardenia Molina MD   ferrous sulfate 325 (65 Fe) mg tablet Take 1 tablet (325 mg total) by mouth 2 (two) times a day with meals 4/29/23   Helen Soriano MD   folic acid (FOLVITE) 700 mcg tablet Take 1 tablet (400 mcg total) by mouth daily 6/2/23   ABENA Castorena   levothyroxine 25 mcg tablet TAKE 1 TABLET BY MOUTH EVERY DAY 4/2/23   Gardenia Molina MD   lidocaine (Lidoderm) 5 % Apply 1 patch topically over 12 hours daily Remove & Discard patch within 12 hours or as directed by MD  Patient not taking: Reported on 8/15/2023 7/18/23   Ozzy Will MD   LORazepam (ATIVAN) 2 mg tablet Take 1 tablet (2 mg total) by mouth every 6 (six) hours as needed for anxiety 6/30/23   Gardenia Molina MD   [facility]ansetron [facility]) 4 mg tablet Take 1 tablet (4 mg total) by mouth every 8 (eight) hours as needed for nausea or vomiting 8/15/23   Ralf Cid MD   pantoprazole (PROTONIX) 40 mg tablet Take 1 tablet (40 mg total) by mouth 2 (two) times a day 1/24/23   Gardenia Molina MD   PARoxetine (PAXIL) 30 mg tablet Take 2 tablets (60 mg total) by mouth in the morning 5/22/23   Gardenia Molina MD   QUEtiapine (SEROquel XR) 400 mg 24 hr tablet Take 1 tablet (400 mg total) by mouth daily at bedtime 5/26/23   Gardenia Molina MD   sucralfate (CARAFATE) 1 g tablet TAKE 1 TABLET (1 G TOTAL) BY MOUTH 3 (THREE) TIMES A DAY WITH MEALS 3/25/23   Gardenia Molina MD   triamcinolone (KENALOG) 0.1 % cream APPLY TOPICALLY 2 TIMES A DAY AS NEEDED FOR RASH. 5/24/23   Gardenia Molina MD   vitamin B-12 (VITAMIN B-12) 1,000 mcg tablet Take 1 tablet (1,000 mcg total) by mouth daily 6/2/23   ABENA Castorena   ziprasidone (GEODON) 80 mg capsule TAKE 1 CAPSULE BY MOUTH EVERY DAY 5/24/23   Gardenia Molina MD     I have reviewed home medications with patient personally. Allergies: Allergies   Allergen Reactions   • Risperdal [Risperidone] Shortness Of Breath     Rapid heart beat, SOB   • Zyprexa [Olanzapine] Shortness Of Breath     Rapid heartbeat   • Latex Rash     Band aids, adhesives wears normal underwear, can eat bananas  USE PAPER TAPE       Social History:  Marital Status: Single     Patient Pre-hospital Living Situation: Home  Patient Pre-hospital Level of Mobility: walks    Substance Use History:   Social History     Substance and Sexual Activity   Alcohol Use Not Currently     Social History     Tobacco Use   Smoking Status Never   Smokeless Tobacco Never     Social History     Substance and Sexual Activity   Drug Use Not Currently       Family History:  Family History   Problem Relation Age of Onset   • Uterine cancer Mother    • Esophageal cancer Father    • Colon cancer Maternal Grandmother        Physical Exam:     Vitals:   Blood Pressure: 114/71 (09/09/23 1500)  Pulse: 72 (09/09/23 1500)  Temperature: 98.2 °F (36.8 °C) (09/09/23 1243)  Temp Source: Oral (09/09/23 1243)  Respirations: 20 (09/09/23 1500)  SpO2: 94 % (09/09/23 1500)    Physical Exam  HENT:      Head: Normocephalic and atraumatic. Nose: Nose normal.      Mouth/Throat:      Mouth: Mucous membranes are moist.   Eyes:      Extraocular Movements: Extraocular movements intact. Conjunctiva/sclera: Conjunctivae normal.   Cardiovascular:      Rate and Rhythm: Normal rate and regular rhythm. Pulmonary:      Effort: Pulmonary effort is normal.      Breath sounds: Normal breath sounds. Abdominal:      General: Bowel sounds are normal.      Palpations: Abdomen is soft. Musculoskeletal:         General: Normal range of motion. Cervical back: Normal range of motion and neck supple. Right lower leg: No edema. Left lower leg: No edema. Skin:     General: Skin is warm and dry.    Neurological:      Mental Status: She is alert and oriented to person, place, and time. Cranial Nerves: No cranial nerve deficit. Additional Data:     Lab Results:  Results from last 7 days   Lab Units 09/09/23  1315   WBC Thousand/uL 3.84*   HEMOGLOBIN g/dL 12.1   HEMATOCRIT % 36.0   PLATELETS Thousands/uL 236   NEUTROS PCT % 65   LYMPHS PCT % 18   MONOS PCT % 10   EOS PCT % 6     Results from last 7 days   Lab Units 09/09/23  1315   SODIUM mmol/L 122*   POTASSIUM mmol/L 3.6   CHLORIDE mmol/L 92*   CO2 mmol/L 25   BUN mg/dL 9   CREATININE mg/dL 0.64   ANION GAP mmol/L 5   CALCIUM mg/dL 9.0   ALBUMIN g/dL 3.9   TOTAL BILIRUBIN mg/dL 0.30   ALK PHOS U/L 67   ALT U/L 18   AST U/L 16   GLUCOSE RANDOM mg/dL 87                       Lines/Drains:  Invasive Devices     Peripheral Intravenous Line  Duration           Peripheral IV 09/09/23 Dorsal (posterior); Right Forearm <1 day                    Imaging: No pertinent imaging reviewed. XR chest 2 views    (Results Pending)       EKG and Other Studies Reviewed on Admission:   · EKG: NSR.    ** Please Note: This note has been constructed using a voice recognition system.  **

## 2023-09-09 NOTE — ED ATTENDING ATTESTATION
9/9/2023  IRalph DO, saw and evaluated the patient. I have discussed the patient with the resident/non-physician practitioner and agree with the resident's/non-physician practitioner's findings, Plan of Care, and MDM as documented in the resident's/non-physician practitioner's note, except where noted. All available labs and Radiology studies were reviewed. I was present for key portions of any procedure(s) performed by the resident/non-physician practitioner and I was immediately available to provide assistance. At this point I agree with the current assessment done in the Emergency Department. I have conducted an independent evaluation of this patient a history and physical is as follows:    60 yo female presents for evaluation of dizziness, HA, blurred vision. I have seen pt before and found her to have hyponatremia which was likely the cause of these symptoms. She takes a few medications which are known to cause hyponatremia    Recommend eval for metabolic derangement specifically hyponatremia.  Will likely require admission for further eval and tx of hyponatremia      ED Course         Critical Care Time  Procedures

## 2023-09-09 NOTE — ASSESSMENT & PLAN NOTE
· Na down to 122 from 136 2 months ago  · D/w Renal - will start w/ FR. They will see in consult tomorrow  · F/u U Na, U Cl. F/u Serum uric acid  · U Osm 92 - likely polydipsia - will see how Na improves w/ 1.5L FR.   Hold off on IVF  · F/u TSH, cortisol  · Will do BMP tonight - will have renal texted if Na still downtrending of > 128

## 2023-09-10 LAB
ANION GAP SERPL CALCULATED.3IONS-SCNC: 8 MMOL/L
ATRIAL RATE: 76 BPM
BUN SERPL-MCNC: 9 MG/DL (ref 5–25)
CALCIUM SERPL-MCNC: 9.4 MG/DL (ref 8.4–10.2)
CHLORIDE SERPL-SCNC: 98 MMOL/L (ref 96–108)
CO2 SERPL-SCNC: 26 MMOL/L (ref 21–32)
CREAT SERPL-MCNC: 0.71 MG/DL (ref 0.6–1.3)
ERYTHROCYTE [DISTWIDTH] IN BLOOD BY AUTOMATED COUNT: 14.7 % (ref 11.6–15.1)
GFR SERPL CREATININE-BSD FRML MDRD: 90 ML/MIN/1.73SQ M
GLUCOSE SERPL-MCNC: 84 MG/DL (ref 65–140)
HCT VFR BLD AUTO: 40.8 % (ref 34.8–46.1)
HGB BLD-MCNC: 13.6 G/DL (ref 11.5–15.4)
MAGNESIUM SERPL-MCNC: 2.2 MG/DL (ref 1.9–2.7)
MCH RBC QN AUTO: 28 PG (ref 26.8–34.3)
MCHC RBC AUTO-ENTMCNC: 33.3 G/DL (ref 31.4–37.4)
MCV RBC AUTO: 84 FL (ref 82–98)
P AXIS: 40 DEGREES
PHOSPHATE SERPL-MCNC: 4.1 MG/DL (ref 2.3–4.1)
PLATELET # BLD AUTO: 234 THOUSANDS/UL (ref 149–390)
PMV BLD AUTO: 8.5 FL (ref 8.9–12.7)
POTASSIUM SERPL-SCNC: 4 MMOL/L (ref 3.5–5.3)
PR INTERVAL: 132 MS
QRS AXIS: 3 DEGREES
QRSD INTERVAL: 80 MS
QT INTERVAL: 392 MS
QTC INTERVAL: 441 MS
RBC # BLD AUTO: 4.86 MILLION/UL (ref 3.81–5.12)
SODIUM SERPL-SCNC: 129 MMOL/L (ref 135–147)
SODIUM SERPL-SCNC: 132 MMOL/L (ref 135–147)
T WAVE AXIS: 58 DEGREES
URATE SERPL-MCNC: 3.1 MG/DL (ref 2–7.5)
VENTRICULAR RATE: 76 BPM
WBC # BLD AUTO: 2.84 THOUSAND/UL (ref 4.31–10.16)

## 2023-09-10 PROCEDURE — 84295 ASSAY OF SERUM SODIUM: CPT | Performed by: INTERNAL MEDICINE

## 2023-09-10 PROCEDURE — 99222 1ST HOSP IP/OBS MODERATE 55: CPT | Performed by: INTERNAL MEDICINE

## 2023-09-10 PROCEDURE — 84550 ASSAY OF BLOOD/URIC ACID: CPT | Performed by: GENERAL PRACTICE

## 2023-09-10 PROCEDURE — 85027 COMPLETE CBC AUTOMATED: CPT | Performed by: GENERAL PRACTICE

## 2023-09-10 PROCEDURE — 80048 BASIC METABOLIC PNL TOTAL CA: CPT | Performed by: GENERAL PRACTICE

## 2023-09-10 PROCEDURE — 84100 ASSAY OF PHOSPHORUS: CPT | Performed by: GENERAL PRACTICE

## 2023-09-10 PROCEDURE — 83735 ASSAY OF MAGNESIUM: CPT | Performed by: GENERAL PRACTICE

## 2023-09-10 PROCEDURE — 93010 ELECTROCARDIOGRAM REPORT: CPT | Performed by: INTERNAL MEDICINE

## 2023-09-10 PROCEDURE — 99233 SBSQ HOSP IP/OBS HIGH 50: CPT | Performed by: PHYSICIAN ASSISTANT

## 2023-09-10 RX ORDER — CALCIUM CARBONATE 500 MG/1
500 TABLET, CHEWABLE ORAL 3 TIMES DAILY PRN
Status: DISCONTINUED | OUTPATIENT
Start: 2023-09-10 | End: 2023-09-11 | Stop reason: HOSPADM

## 2023-09-10 RX ADMIN — SUCRALFATE 1 G: 1 TABLET ORAL at 09:11

## 2023-09-10 RX ADMIN — PANTOPRAZOLE SODIUM 40 MG: 40 TABLET, DELAYED RELEASE ORAL at 05:53

## 2023-09-10 RX ADMIN — PAROXETINE HYDROCHLORIDE 60 MG: 20 TABLET, FILM COATED ORAL at 09:14

## 2023-09-10 RX ADMIN — LEVOTHYROXINE SODIUM 25 MCG: 25 TABLET ORAL at 05:53

## 2023-09-10 RX ADMIN — FERROUS SULFATE TAB 325 MG (65 MG ELEMENTAL FE) 325 MG: 325 (65 FE) TAB at 17:22

## 2023-09-10 RX ADMIN — ZIPRASIDONE HYDROCHLORIDE 80 MG: 40 CAPSULE ORAL at 09:15

## 2023-09-10 RX ADMIN — CALCIUM CARBONATE (ANTACID) CHEW TAB 500 MG 500 MG: 500 CHEW TAB at 09:11

## 2023-09-10 RX ADMIN — CYANOCOBALAMIN TAB 500 MCG 1000 MCG: 500 TAB at 09:11

## 2023-09-10 RX ADMIN — FERROUS SULFATE TAB 325 MG (65 MG ELEMENTAL FE) 325 MG: 325 (65 FE) TAB at 09:11

## 2023-09-10 RX ADMIN — FOLIC ACID TAB 400 MCG 400 MCG: 400 TAB at 09:14

## 2023-09-10 RX ADMIN — QUETIAPINE FUMARATE 400 MG: 200 TABLET, EXTENDED RELEASE ORAL at 21:14

## 2023-09-10 RX ADMIN — CELECOXIB 200 MG: 200 CAPSULE ORAL at 09:13

## 2023-09-10 RX ADMIN — SUCRALFATE 1 G: 1 TABLET ORAL at 12:52

## 2023-09-10 RX ADMIN — PANTOPRAZOLE SODIUM 40 MG: 40 TABLET, DELAYED RELEASE ORAL at 17:22

## 2023-09-10 RX ADMIN — SUCRALFATE 1 G: 1 TABLET ORAL at 17:22

## 2023-09-10 RX ADMIN — ENOXAPARIN SODIUM 40 MG: 40 INJECTION SUBCUTANEOUS at 09:21

## 2023-09-10 NOTE — CONSULTS
Consultation - Nephrology   Devyn Kirkland 61 y.o. female MRN: [id number]  Unit/Bed#: -01 Encounter: [id number]    ASSESSMENT and PLAN:  1. Hyponatremia, timing of acute vs chronic unknown, but with history of intermittent mild hyponatremia previously  -sNa on admit 122, improved to 132 s/p fluid restriction alone.   -avoid NSAIDs, paxil can contribute  -urine Na and urine Cl low with low urine osm - all suggestive of psychogenic polydipsia  -avoid IVF  -We will lift fluid restriction in light of overcorrection  -repeat BMP later this evening goal sNa 128-130 today  -f/u repeat BMP    2. Hypokalemia -resolved status post repletion, potassium 4 today    3. Postconcussion syndrome- needs management of headache as pain is also a nonasthmatic stimulus for ADH, avoid NSAIDs    4. HTN -BP low normal on last check, continue amlodipine 5 mg daily with hold parameters    5. Hgb normal, on oral iron, monitor CBC    HISTORY OF PRESENT ILLNESS:  Requesting Physician: Angel Phalen, DO  Reason for Consult: hyponartremia    Devyn Kirkland is a 61y.o. year old female who was admitted to [facility] after presenting with headache and dizzines. A renal consultation is requested today for assistance in the management of hyponatremia. The patient states that she has been having nausea, dizziness, headache and blurry vision. She states has been ongoing since she fell in July follow-up [employer] was on clothing rack fell on her head. She has been going to PT due to her vestibular issues. She has been told she has postconcussive syndrome. She is eating well. She denies fevers but has chills. She complains of nausea with vomiting yesterday. She denies movement yesterday due to her dizziness and vomiting. Denies diarrhea, constipation, chest pain, shortness or leg edema. She does take Celebrex daily.     PAST MEDICAL HISTORY:  Past Medical History:   Diagnosis Date   • Anemia    • Anxiety    • Arthritis    • Back pain at L4-L5 level    • Schreiber esophagus    • Bulimia    • Colon polyp    • Disease of thyroid gland     hypothyroid   • GERD (gastroesophageal reflux disease)    • Hearing loss in left ear    • History of transfusion    • Hyperlipidemia    • Hypertension    • Hypothyroidism    • Leukopenia    • NMS (neuroleptic malignant syndrome) 01/03/2020   • Pneumonia    • RA (rheumatoid arthritis) ([address])     in feet   • Sciatica    • Seizure ([address])     due to medication mix up 8/2018 only one historically   • Skin spots, red     lower right arm - poss from cat- no s/s infection   • Synovial cyst of lumbar spine    • Vertigo    • Wears dentures     full set   • Weight gain        PAST SURGICAL HISTORY:  Past Surgical History:   Procedure Laterality Date   • BONE MARROW BIOPSY     • BREAST SURGERY      enlargement with implants   • CLOSED REDUCTION DISTAL FEMUR FRACTURE     • COLONOSCOPY     • COSMETIC SURGERY     • DENTAL SURGERY     • HIP ARTHROPLASTY Right 01/02/2020    Procedure: REVISION TOTAL HIP ARTHROPLASTY WITH REPAIR OF PARIPROSTHETIC FRACTURE - POSTERIOR APPROACH;  Surgeon: Scar Galdamez MD;  [facility] MA[facility];  Service: Orthopedics   • NC AMPUTATION METATARSAL W/TOE SINGLE Left 04/07/2023    Procedure: AMPUTATION TOE 4th;  Surgeon: Brandyn Valdovinos DPM;  Location: [address] [place] [address] MAIN OR;  Service: Podiatry   • NC ARTHRP ACETBLR/PROX FEM PROSTC AGRFT/ALGRFT Right 12/11/2019    Procedure: ARTHROPLASTY HIP TOTAL ANTERIOR;  Surgeon: Scar Galdamez MD;  [facility] MA[facility];  Service: Orthopedics   • NC ESOPHAGOGASTRODUODENOSCOPY TRANSORAL DIAGNOSTIC N/A 05/11/2021    Procedure: ESOPHAGOGASTRODUODENOSCOPY (EGD); Surgeon: Edd Ruff MD;  [facility] MA[facility];  Service: General   • NC LAPS RPR PARAESPHGL HRNA INCL FUNDPLSTY [address] N/A 05/11/2021    Procedure: REPAIR HERNIA PARAESOPHAGEAL  LAPAROSCOPIC;  Surgeon:  Edd Ruff MD;  [facility];  Service: General   • NC UNLISTED PROCEDURE ESOPHAGUS N/A 05/11/2021    Procedure: FUNDOPLICATION TRANSORAL INCISIONLESS;  Surgeon: Charly Hunter MD;  [facility];  Service: Gastroenterology   • RHINOPLASTY     • SINUS SURGERY     • TOE AMPUTATION Left     2023 4th toe   • TRANSORAL INCISIONLESS FUNDOPLICATION (TIF)  00/87/4946       ALLERGIES:  Allergies   Allergen Reactions   • Risperdal [Risperidone] Shortness Of Breath     Rapid heart beat, SOB   • Zyprexa [Olanzapine] Shortness Of Breath     Rapid heartbeat   • Latex Rash     Band aids, adhesives wears normal underwear, can eat bananas  USE PAPER TAPE       SOCIAL HISTORY:  Social History     Substance and Sexual Activity   Alcohol Use Not Currently     Social History     Substance and Sexual Activity   Drug Use Not Currently     Social History     Tobacco Use   Smoking Status Never   Smokeless Tobacco Never       FAMILY HISTORY:  Family History   Problem Relation Age of Onset   • Uterine cancer Mother    • Esophageal cancer Father    • Colon cancer Maternal Grandmother        MEDICATIONS:    Current Facility-Administered Medications:   •  acetaminophen (TYLENOL) tablet 650 mg, 650 mg, Oral, Q6H PRN, Carlita Corral, DO  •  amLODIPine (NORVASC) tablet 5 mg, 5 mg, Oral, Daily, Carlita Corral, DO  •  calcium carbonate (TUMS) chewable tablet 500 mg, 500 mg, Oral, TID PRN, Levon Najjar, PA-C  •  celecoxib (CeleBREX) capsule 200 mg, 200 mg, Oral, Daily, Carlita Corral, DO  •  cyanocobalamin (VITAMIN B-12) tablet 1,000 mcg, 1,000 mcg, Oral, Daily, Carlita Corral, DO  •  diphenhydrAMINE (BENADRYL) injection 25 mg, 25 mg, Intravenous, Once, Carlita Corral, DO  •  enoxaparin (LOVENOX) subcutaneous injection 40 mg, 40 mg, Subcutaneous, Daily, Carlita Corral, DO, 40 mg at 09/09/23 1741  •  ferrous sulfate tablet 325 mg, 325 mg, Oral, BID With Meals, Carlita Corral, DO  •  folic acid (FOLVITE) tablet 400 mcg, 400 mcg, Oral, Daily, Carlita Corral, DO  •  levothyroxine tablet 25 mcg, 25 mcg, Oral, Early Morning, Bruce Rivera DO, 25 mcg at 09/10/23 6320  •  LORazepam (ATIVAN) tablet 2 mg, 2 mg, Oral, Q6H PRN, Bruce Rivera DO  •  ondansetron (ZOFRAN) injection 4 mg, 4 mg, Intravenous, Once, Bruce Rivera DO  •  pantoprazole (PROTONIX) EC tablet 40 mg, 40 mg, Oral, BID AC, Bruce Rivera DO, 40 mg at 09/10/23 9556  •  PARoxetine (PAXIL) tablet 60 mg, 60 mg, Oral, Daily, Bruce Rivera DO  •  QUEtiapine (SEROquel XR) 24 hr tablet 400 mg, 400 mg, Oral, HS, Bruce Rivera DO, 400 mg at 09/09/23 2101  •  sucralfate (CARAFATE) tablet 1 g, 1 g, Oral, TID With Meals, Bruce Rivera DO  •  triamcinolone (KENALOG) 0.1 % cream, , Topical, BID PRN, Bruce Rivera DO  •  ziprasidone (GEODON) capsule 80 mg, 80 mg, Oral, Daily, Bruce Rivera DO    REVIEW OF SYSTEMS:  More than 10 systems were reviewed. No other pertinent positive findings other than those mentioned in HPI. PHYSICAL EXAM:  Current Weight: Weight - Scale: 82.1 kg (181 lb)  First Weight: Weight - Scale: 82.1 kg (181 lb)  Vitals:    09/09/23 1732 09/09/23 1735 09/09/23 2345 09/10/23 0716   BP: 126/87 126/87 128/88 108/58   BP Location:  Left arm     Pulse: 72 72 71 88   Resp: 14 14 18   Temp: (!) 97.3 °F (36.3 °C) (!) 97.3 °F (36.3 °C) 97.5 °F (36.4 °C) 97.9 °F (36.6 °C)   TempSrc:  Oral     SpO2: 94%  90% 95%   Weight:  82.1 kg (181 lb)     Height:  5' 2" (1.575 m)         Intake/Output Summary (Last 24 hours) at 9/10/2023 0832  Last data filed at 9/10/2023 0701  Gross per 24 hour   Intake 540 ml   Output 1900 ml   Net -1360 ml     Physical Exam  Vitals and nursing note reviewed. Constitutional:       General: She is not in acute distress. Appearance: She is well-developed. She is obese. She is not diaphoretic. Comments: Sitting up in chair   HENT:      Head: Normocephalic and atraumatic. Nose: Nose normal.      Mouth/Throat:      Mouth: Mucous membranes are moist.      Pharynx: No oropharyngeal exudate. Eyes:      General: No scleral icterus. Right eye: No discharge. Left eye: No discharge. Neck:      Thyroid: No thyromegaly. Cardiovascular:      Rate and Rhythm: Normal rate and regular rhythm. Heart sounds: No murmur heard. Pulmonary:      Effort: Pulmonary effort is normal. No respiratory distress. Breath sounds: Normal breath sounds. No wheezing. Abdominal:      General: Bowel sounds are normal. There is no distension. Palpations: Abdomen is soft. Musculoskeletal:         General: No swelling. Cervical back: Normal range of motion and neck supple. Skin:     General: Skin is warm and dry. Findings: No rash. Neurological:      General: No focal deficit present. Mental Status: She is alert. Motor: No abnormal muscle tone.       Comments: awake   Psychiatric:         Mood and Affect: Mood normal.         Behavior: Behavior normal.         Invasive Devices:      Lab Results:   Results from last 7 days   Lab Units 09/10/23  0603 09/09/23  2107 09/09/23  1315   WBC Thousand/uL 2.84*  --  3.84*   HEMOGLOBIN g/dL 13.6  --  12.1   HEMATOCRIT % 40.8  --  36.0   PLATELETS Thousands/uL 234  --  236   POTASSIUM mmol/L  --  3.4* 3.6   CHLORIDE mmol/L  --  95* 92*   CO2 mmol/L  --  28 25   BUN mg/dL  --  9 9   CREATININE mg/dL  --  0.84 0.64   CALCIUM mg/dL  --  9.7 9.0   ALK PHOS U/L  --   --  67   ALT U/L  --   --  18   AST U/L  --   --  16

## 2023-09-10 NOTE — ASSESSMENT & PLAN NOTE
· Pt had head trauma 3 months ago and had HAs and blurred vision and nausea since then. Went to PT which helped until yesterday. · Was on celebrex as outpatient, now discontinued by nephrology because it could contribute to hyponatremia  · Will trial 1 time dose migraine cocktail - toradol discontinued by nephrology because it could contribute to hyponatremia.   Also careful w/ Reglan due to antipsychotic use

## 2023-09-10 NOTE — ASSESSMENT & PLAN NOTE
· C/w home meds  · Per nephrology treatment plan note- paxil can contribute to hyponatremia. Formal nephrology consult pending.  Continue home meds for now

## 2023-09-10 NOTE — ASSESSMENT & PLAN NOTE
· Na improving 128 last night, up from 122. This AM labs pending  · D/w Renal - started fluid restriction.  Formal consult pending  · TSH 2.010, cortisol 4.6

## 2023-09-10 NOTE — PROGRESS NOTES
[address] [facility]  Progress Note  Name: Yuridia Velásquez I  MRN: [id number]  Unit/Bed#: -20 I Date of Admission: 9/9/2023   Date of Service: 9/10/2023 I Hospital Day: 1    Assessment/Plan   Post concussion syndrome  Assessment & Plan  · Pt had head trauma 3 months ago and had HAs and blurred vision and nausea since then. Went to PT which helped until yesterday. · Was on celebrex as outpatient, now discontinued by nephrology because it could contribute to hyponatremia  · Will trial 1 time dose migraine cocktail - toradol discontinued by nephrology because it could contribute to hyponatremia. Also careful w/ Reglan due to antipsychotic use    Erosive esophagitis  Assessment & Plan  · C/w PPI and Carafate  · Patient with complaints of "heart burn" this AM, requested PRN Tums    Schizoaffective disorder ([address])  Assessment & Plan  · C/w home meds  · Per nephrology treatment plan note- paxil can contribute to hyponatremia. Formal nephrology consult pending. Continue home meds for now    * Hyponatremia  Assessment & Plan  · Na improving 128 last night, up from 122. This AM labs pending  · D/w Renal - started fluid restriction. Formal consult pending  · TSH 2.010, cortisol 4.6                   VTE Pharmacologic Prophylaxis:   Pharmacologic: Enoxaparin (Lovenox)  Mechanical VTE Prophylaxis in Place: No    Patient Centered Rounds: I have performed bedside rounds with nursing staff today. Education and Discussions with Family / Patient: call to patient's mother on home and cell number- no answer    Time Spent for Care: 30 minutes. More than 50% of total time spent on counseling and coordination of care as described above. Current Length of Stay: 1 day(s)    Current Patient Status: Inpatient   Certification Statement: The patient will continue to require additional inpatient hospital stay due to requires close monitoring of labs.  nephrology consult pending    Discharge Plan: pending    Code Status: Level 1 - Full Code      Subjective:   61year old female admitted last evening for headache and dizziness. Found to be hyponatremic with initial sodium of 122. Headache resolved. She complains of heart burn which is chronic. Appetite good    Objective:     Vitals:   Temp (24hrs), Av.6 °F (36.4 °C), Min:97.3 °F (36.3 °C), Max:98.2 °F (36.8 °C)    Temp:  [97.3 °F (36.3 °C)-98.2 °F (36.8 °C)] 97.9 °F (36.6 °C)  HR:  [71-88] 88  Resp:  [14-20] 18  BP: (106-128)/(58-88) 108/58  SpO2:  [90 %-95 %] 95 %  Body mass index is 33.11 kg/m². Input and Output Summary (last 24 hours): Intake/Output Summary (Last 24 hours) at 9/10/2023 0838  Last data filed at 9/10/2023 0701  Gross per 24 hour   Intake 540 ml   Output 1900 ml   Net -1360 ml       Physical Exam:     Physical Exam  Vitals reviewed. Constitutional:       General: She is not in acute distress. Appearance: She is not ill-appearing or diaphoretic. HENT:      Head: Normocephalic and atraumatic. Nose: Nose normal.      Mouth/Throat:      Pharynx: Oropharynx is clear. Eyes:      Conjunctiva/sclera: Conjunctivae normal.   Cardiovascular:      Rate and Rhythm: Normal rate. Pulmonary:      Effort: Pulmonary effort is normal. No respiratory distress. Abdominal:      General: Bowel sounds are normal. There is no distension. Palpations: Abdomen is soft. Comments: Mild epigastric tenderness   Musculoskeletal:         General: No deformity or signs of injury. Skin:     General: Skin is warm and dry. Findings: No bruising or erythema. Neurological:      Mental Status: She is alert. Mental status is at baseline.            Additional Data:     Labs:    Results from last 7 days   Lab Units 09/10/23  0603 23  1315   WBC Thousand/uL 2.84* 3.84*   HEMOGLOBIN g/dL 13.6 12.1   HEMATOCRIT % 40.8 36.0   PLATELETS Thousands/uL 234 236   NEUTROS PCT %  --  65   LYMPHS PCT %  --  18   MONOS PCT %  --  10 EOS PCT %  --  6     Results from last 7 days   Lab Units 09/09/23  2107 09/09/23  1315   SODIUM mmol/L 128* 122*   POTASSIUM mmol/L 3.4* 3.6   CHLORIDE mmol/L 95* 92*   CO2 mmol/L 28 25   BUN mg/dL 9 9   CREATININE mg/dL 0.84 0.64   ANION GAP mmol/L 5 5   CALCIUM mg/dL 9.7 9.0   ALBUMIN g/dL  --  3.9   TOTAL BILIRUBIN mg/dL  --  0.30   ALK PHOS U/L  --  67   ALT U/L  --  18   AST U/L  --  16   GLUCOSE RANDOM mg/dL 111 87                           * I Have Reviewed All Lab Data Listed Above. * Additional Pertinent Lab Tests Reviewed:  [address] Admission Reviewed        Recent Cultures (last 7 days):           Last 24 Hours Medication List:   Current Facility-Administered Medications   Medication Dose Route Frequency Provider Last Rate   • acetaminophen  650 mg Oral Q6H PRN Rich Lass, DO     • amLODIPine  5 mg Oral Daily Rich Lass, DO     • calcium carbonate  500 mg Oral TID PRN Ketan Powers PA-C     • celecoxib  200 mg Oral Daily Rich Lass, DO     • vitamin B-12  1,000 mcg Oral Daily Rich Lass, DO     • diphenhydrAMINE  25 mg Intravenous Once Rich Lass, DO     • enoxaparin  40 mg Subcutaneous Daily Rich Lass, DO     • ferrous sulfate  325 mg Oral BID With Meals Rich Lass, DO     • folic acid  986 mcg Oral Daily Rich Lass, DO     • levothyroxine  25 mcg Oral Early Morning Rich Lass, DO     • LORazepam  2 mg Oral Q6H PRN Rich Lass, DO     • ondansetron  4 mg Intravenous Once Rich Lass, DO     • pantoprazole  40 mg Oral BID AC Rich Lass, DO     • PARoxetine  60 mg Oral Daily Rich Lass, DO     • QUEtiapine  400 mg Oral HS Rich Lass, DO     • sucralfate  1 g Oral TID With Meals Rich Lass, DO     • triamcinolone   Topical BID PRN Rich Lass, DO     • ziprasidone  80 mg Oral Daily Rich Lass, DO          Today, Patient Was Seen By: Alvaro Barrett PA-C    ** Please Note: Dictation voice to text software may have been used in the creation of this document.  **

## 2023-09-10 NOTE — PLAN OF CARE
Problem: Potential for Falls  Goal: Patient will remain free of falls  Description: INTERVENTIONS:  - Educate patient/family on patient safety including physical limitations  - Instruct patient to call for assistance with activity   - Consult OT/PT to assist with strengthening/mobility   - Keep Call bell within reach  - Keep bed low and locked with side rails adjusted as appropriate  - Keep care items and personal belongings within reach  - Initiate and maintain comfort rounds  - Make Fall Risk Sign visible to staff  - Offer Toileting every  Hours, in advance of need  - Initiate/Maintain alarm  - Obtain necessary fall risk management equipment:   - Apply yellow socks and bracelet for high fall risk patients  - Consider moving patient to room near nurses station  9/10/2023 1033 by Jacinda Fall  Outcome: Progressing  9/10/2023 1033 by Jacinda Fall  Outcome: Progressing     Problem: MOBILITY - ADULT  Goal: Maintain or return to baseline ADL function  Description: INTERVENTIONS:  -  Assess patient's ability to carry out ADLs; assess patient's baseline for ADL function and identify physical deficits which impact ability to perform ADLs (bathing, care of mouth/teeth, toileting, grooming, dressing, etc.)  - Assess/evaluate cause of self-care deficits   - Assess range of motion  - Assess patient's mobility; develop plan if impaired  - Assess patient's need for assistive devices and provide as appropriate  - Encourage maximum independence but intervene and supervise when necessary  - Involve family in performance of ADLs  - Assess for home care needs following discharge   - Consider OT consult to assist with ADL evaluation and planning for discharge  - Provide patient education as appropriate  9/10/2023 1033 by Jacinda Fall  Outcome: Progressing  9/10/2023 1033 by Jacinda Fall  Outcome: Progressing  Goal: Maintains/Returns to pre admission functional level  Description: INTERVENTIONS:  - Perform BMAT or MOVE assessment daily.   - Set and communicate daily mobility goal to care team and patient/family/caregiver. - Collaborate with rehabilitation services on mobility goals if consulted  - Perform Range of Motion  times a day. - Reposition patient every  hours.   - Dangle patient  times a day  - Stand patient  times a day  - Ambulate patient  times a day  - Out of bed to chair  times a day   - Out of bed for meals  times a day  - Out of bed for toileting  - Record patient progress and toleration of activity level   9/10/2023 1033 by Katie Snider  Outcome: Progressing  9/10/2023 1033 by Katie Snider  Outcome: Progressing     Problem: PAIN - ADULT  Goal: Verbalizes/displays adequate comfort level or baseline comfort level  Description: Interventions:  - Encourage patient to monitor pain and request assistance  - Assess pain using appropriate pain scale  - Administer analgesics based on type and severity of pain and evaluate response  - Implement non-pharmacological measures as appropriate and evaluate response  - Consider cultural and social influences on pain and pain management  - Notify physician/advanced practitioner if interventions unsuccessful or patient reports new pain  9/10/2023 1033 by Katie Snider  Outcome: Progressing  9/10/2023 1033 by Katie Snider  Outcome: Progressing     Problem: INFECTION - ADULT  Goal: Absence or prevention of progression during hospitalization  Description: INTERVENTIONS:  - Assess and monitor for signs and symptoms of infection  - Monitor lab/diagnostic results  - Monitor all insertion sites, i.e. indwelling lines, tubes, and drains  - Monitor endotracheal if appropriate and nasal secretions for changes in amount and color  - Jbphh appropriate cooling/warming therapies per order  - Administer medications as ordered  - Instruct and encourage patient and family to use good hand hygiene technique  - Identify and instruct in appropriate isolation precautions for identified infection/condition  9/10/2023 1033 by Sindhu Arana  Outcome: Progressing  9/10/2023 1033 by Sindhu Arana  Outcome: Progressing  Goal: Absence of fever/infection during neutropenic period  Description: INTERVENTIONS:  - Monitor WBC    9/10/2023 1033 by Sindhu Arana  Outcome: Progressing  9/10/2023 1033 by Sindhu Arana  Outcome: Progressing     Problem: SAFETY ADULT  Goal: Patient will remain free of falls  Description: INTERVENTIONS:  - Educate patient/family on patient safety including physical limitations  - Instruct patient to call for assistance with activity   - Consult OT/PT to assist with strengthening/mobility   - Keep Call bell within reach  - Keep bed low and locked with side rails adjusted as appropriate  - Keep care items and personal belongings within reach  - Initiate and maintain comfort rounds  - Make Fall Risk Sign visible to staff  - Offer Toileting every  Hours, in advance of need  - Initiate/Maintain alarm  - Obtain necessary fall risk management equipment:   - Apply yellow socks and bracelet for high fall risk patients  - Consider moving patient to room near nurses station  9/10/2023 1033 by Sindhu Arana  Outcome: Progressing  9/10/2023 1033 by Sindhu Arana  Outcome: Progressing  Goal: Maintain or return to baseline ADL function  Description: INTERVENTIONS:  -  Assess patient's ability to carry out ADLs; assess patient's baseline for ADL function and identify physical deficits which impact ability to perform ADLs (bathing, care of mouth/teeth, toileting, grooming, dressing, etc.)  - Assess/evaluate cause of self-care deficits   - Assess range of motion  - Assess patient's mobility; develop plan if impaired  - Assess patient's need for assistive devices and provide as appropriate  - Encourage maximum independence but intervene and supervise when necessary  - Involve family in performance of ADLs  - Assess for home care needs following discharge   - Consider OT consult to assist with ADL evaluation and planning for discharge  - Provide patient education as appropriate  9/10/2023 1033 by Becky Pelayo  Outcome: Progressing  9/10/2023 1033 by Becky Pelayo  Outcome: Progressing  Goal: Maintains/Returns to pre admission functional level  Description: INTERVENTIONS:  - Perform BMAT or MOVE assessment daily.   - Set and communicate daily mobility goal to care team and patient/family/caregiver. - Collaborate with rehabilitation services on mobility goals if consulted  - Perform Range of Motion  times a day. - Reposition patient every  hours. - Dangle patient  times a day  - Stand patient  times a day  - Ambulate patient  times a day  - Out of bed to chair  times a day   - Out of bed for meals  times a day  - Out of bed for toileting  - Record patient progress and toleration of activity level   9/10/2023 1033 by Becky Pelayo  Outcome: Progressing  9/10/2023 1033 by Becky Pelayo  Outcome: Progressing     Problem: DISCHARGE PLANNING  Goal: Discharge to home or other facility with appropriate resources  Description: INTERVENTIONS:  - Identify barriers to discharge w/patient and caregiver  - Arrange for needed discharge resources and transportation as appropriate  - Identify discharge learning needs (meds, wound care, etc.)  - Arrange for interpretive services to assist at discharge as needed  - Refer to Case Management Department for coordinating discharge planning if the patient needs post-hospital services based on physician/advanced practitioner order or complex needs related to functional status, cognitive ability, or social support system  9/10/2023 1033 by Becky Pelayo  Outcome: Progressing  9/10/2023 1033 by Becky Pelayo  Outcome: Progressing     Problem: Knowledge Deficit  Goal: Patient/family/caregiver demonstrates understanding of disease process, treatment plan, medications, and discharge instructions  Description: Complete learning assessment and assess knowledge base.   Interventions:  - Provide teaching at level of understanding  - Provide teaching via preferred learning methods  9/10/2023 1033 by Remi Tesfaye  Outcome: Progressing  9/10/2023 1033 by Remi Tesfaye  Outcome: Progressing     Problem: SKIN/TISSUE INTEGRITY - ADULT  Goal: Skin Integrity remains intact(Skin Breakdown Prevention)  Description: Assess:  -Perform Dominik assessment every   -Clean and moisturize skin every   -Inspect skin when repositioning, toileting, and assisting with ADLS  -Assess under medical devices such as  every   -Assess extremities for adequate circulation and sensation     Bed Management:  -Have minimal linens on bed & keep smooth, unwrinkled  -Change linens as needed when moist or perspiring  -Avoid sitting or lying in one position for more than  hours while in bed  -Keep HOB at degrees     Toileting:  -Offer bedside commode  -Assess for incontinence every   -Use incontinent care products after each incontinent episode such as     Activity:  -Mobilize patient  times a day  -Encourage activity and walks on unit  -Encourage or provide ROM exercises   -Turn and reposition patient every  Hours  -Use appropriate equipment to lift or move patient in bed  -Instruct/ Assist with weight shifting every  when out of bed in chair  -Consider limitation of chair time  hour intervals    Skin Care:  -Avoid use of baby powder, tape, friction and shearing, hot water or constrictive clothing  -Relieve pressure over bony prominences using   -Do not massage red bony areas    Next Steps:  -Teach patient strategies to minimize risks such as    -Consider consults to  interdisciplinary teams such as   Outcome: Progressing  Goal: Incision(s), wounds(s) or drain site(s) healing without S/S of infection  Description: INTERVENTIONS  - Assess and document dressing, incision, wound bed, drain sites and surrounding tissue  - Provide patient and family education  - Perform skin care/dressing changes every   Outcome: Progressing  Goal: Pressure injury heals and does not worsen  Description: Interventions:  - Implement low air loss mattress or specialty surface (Criteria met)  - Apply silicone foam dressing  - Instruct/assist with weight shifting every  minutes when in chair   - Limit chair time to  hour intervals  - Use special pressure reducing interventions such as  when in chair   - Apply fecal or urinary incontinence containment device   - Perform passive or active ROM every   - Turn and reposition patient & offload bony prominences every hours   - Utilize friction reducing device or surface for transfers   - Consider consults to  interdisciplinary teams such as   - Use incontinent care products after each incontinent episode such as   - Consider nutrition services referral as needed  Outcome: Progressing

## 2023-09-11 VITALS
RESPIRATION RATE: 16 BRPM | WEIGHT: 181 LBS | OXYGEN SATURATION: 94 % | SYSTOLIC BLOOD PRESSURE: 113 MMHG | TEMPERATURE: 97.2 F | DIASTOLIC BLOOD PRESSURE: 81 MMHG | BODY MASS INDEX: 33.31 KG/M2 | HEART RATE: 91 BPM | HEIGHT: 62 IN

## 2023-09-11 LAB
ANION GAP SERPL CALCULATED.3IONS-SCNC: 5 MMOL/L
BASOPHILS # BLD AUTO: 0.03 THOUSANDS/ÂΜL (ref 0–0.1)
BASOPHILS NFR BLD AUTO: 1 % (ref 0–1)
BUN SERPL-MCNC: 9 MG/DL (ref 5–25)
CALCIUM SERPL-MCNC: 9.2 MG/DL (ref 8.4–10.2)
CHLORIDE SERPL-SCNC: 100 MMOL/L (ref 96–108)
CO2 SERPL-SCNC: 27 MMOL/L (ref 21–32)
CREAT SERPL-MCNC: 0.72 MG/DL (ref 0.6–1.3)
EOSINOPHIL # BLD AUTO: 0.23 THOUSAND/ÂΜL (ref 0–0.61)
EOSINOPHIL NFR BLD AUTO: 9 % (ref 0–6)
ERYTHROCYTE [DISTWIDTH] IN BLOOD BY AUTOMATED COUNT: 14.9 % (ref 11.6–15.1)
GFR SERPL CREATININE-BSD FRML MDRD: 89 ML/MIN/1.73SQ M
GLUCOSE SERPL-MCNC: 84 MG/DL (ref 65–140)
HCT VFR BLD AUTO: 39.9 % (ref 34.8–46.1)
HGB BLD-MCNC: 13.3 G/DL (ref 11.5–15.4)
IMM GRANULOCYTES # BLD AUTO: 0.01 THOUSAND/UL (ref 0–0.2)
IMM GRANULOCYTES NFR BLD AUTO: 0 % (ref 0–2)
LYMPHOCYTES # BLD AUTO: 0.56 THOUSANDS/ÂΜL (ref 0.6–4.47)
LYMPHOCYTES NFR BLD AUTO: 21 % (ref 14–44)
MCH RBC QN AUTO: 28.3 PG (ref 26.8–34.3)
MCHC RBC AUTO-ENTMCNC: 33.3 G/DL (ref 31.4–37.4)
MCV RBC AUTO: 85 FL (ref 82–98)
MONOCYTES # BLD AUTO: 0.29 THOUSAND/ÂΜL (ref 0.17–1.22)
MONOCYTES NFR BLD AUTO: 11 % (ref 4–12)
NEUTROPHILS # BLD AUTO: 1.53 THOUSANDS/ÂΜL (ref 1.85–7.62)
NEUTS SEG NFR BLD AUTO: 58 % (ref 43–75)
NRBC BLD AUTO-RTO: 0 /100 WBCS
PLATELET # BLD AUTO: 224 THOUSANDS/UL (ref 149–390)
PMV BLD AUTO: 8.5 FL (ref 8.9–12.7)
POTASSIUM SERPL-SCNC: 4 MMOL/L (ref 3.5–5.3)
RBC # BLD AUTO: 4.7 MILLION/UL (ref 3.81–5.12)
SODIUM SERPL-SCNC: 132 MMOL/L (ref 135–147)
WBC # BLD AUTO: 2.65 THOUSAND/UL (ref 4.31–10.16)

## 2023-09-11 PROCEDURE — 80048 BASIC METABOLIC PNL TOTAL CA: CPT | Performed by: PHYSICIAN ASSISTANT

## 2023-09-11 PROCEDURE — 97163 PT EVAL HIGH COMPLEX 45 MIN: CPT

## 2023-09-11 PROCEDURE — 99239 HOSP IP/OBS DSCHRG MGMT >30: CPT | Performed by: PHYSICIAN ASSISTANT

## 2023-09-11 PROCEDURE — 99232 SBSQ HOSP IP/OBS MODERATE 35: CPT | Performed by: INTERNAL MEDICINE

## 2023-09-11 PROCEDURE — 85025 COMPLETE CBC W/AUTO DIFF WBC: CPT | Performed by: PHYSICIAN ASSISTANT

## 2023-09-11 RX ADMIN — ENOXAPARIN SODIUM 40 MG: 40 INJECTION SUBCUTANEOUS at 09:24

## 2023-09-11 RX ADMIN — SUCRALFATE 1 G: 1 TABLET ORAL at 09:23

## 2023-09-11 RX ADMIN — FERROUS SULFATE TAB 325 MG (65 MG ELEMENTAL FE) 325 MG: 325 (65 FE) TAB at 09:22

## 2023-09-11 RX ADMIN — CYANOCOBALAMIN TAB 500 MCG 1000 MCG: 500 TAB at 09:22

## 2023-09-11 RX ADMIN — CELECOXIB 200 MG: 200 CAPSULE ORAL at 09:25

## 2023-09-11 RX ADMIN — ZIPRASIDONE HYDROCHLORIDE 80 MG: 40 CAPSULE ORAL at 09:25

## 2023-09-11 RX ADMIN — PAROXETINE HYDROCHLORIDE 60 MG: 20 TABLET, FILM COATED ORAL at 09:27

## 2023-09-11 RX ADMIN — FOLIC ACID TAB 400 MCG 400 MCG: 400 TAB at 09:25

## 2023-09-11 RX ADMIN — PANTOPRAZOLE SODIUM 40 MG: 40 TABLET, DELAYED RELEASE ORAL at 09:23

## 2023-09-11 RX ADMIN — LEVOTHYROXINE SODIUM 25 MCG: 25 TABLET ORAL at 05:28

## 2023-09-11 NOTE — PHYSICAL THERAPY NOTE
Physical Therapy Evaluation    Patient's Name: Vergia Pyo    Admitting Diagnosis  Dizziness [R42]  Lightheadedness [R42]  Hyponatremia [E87.1]    Problem List  Patient Active Problem List   Diagnosis    Lumbar radiculopathy    DDD (degenerative disc disease), lumbar    Spondylolisthesis at L4-L5 level    Localized osteoporosis with current pathological fracture with routine healing    Spinal stenosis of lumbar region with neurogenic claudication    Lumbar spondylosis    T12 compression fracture ([facility])    Synovial cyst of lumbar facet joint    Anxiety    Bulimia nervosa in remission    Constipation    Acquired hypothyroidism    Other fatigue    Serotonin syndrome    Schizoaffective disorder ([facility])    Psychiatric disorder    Dermal hypersensitivity reaction    Essential hypertension    Right hip pain    Chronic bilateral low back pain without sciatica    Severe iron deficiency anemia     Preop exam for internal medicine    NMS (neuroleptic malignant syndrome)    Hypokalemia    Fall    Esophagitis    Hyponatremia    Problem related to living arrangement    Dizziness    Acute non-recurrent maxillary sinusitis    Iron deficiency anemia    Multiple excoriations    Rash    Cellulitis of left upper extremity    Erosive esophagitis    Melena    Hiatal hernia    Polyp of colon    Word finding difficulty    SIRS (systemic inflammatory response syndrome) ([facility])    Hyperlipidemia    Nausea and vomiting    S/P tooth extraction    Upper GI bleed    Pruritus    Self neglect    Microcytic anemia    Schreiber's esophagus    Obesity, Class I, BMI 30-34.9    Acute cough    Acute encephalopathy    Thrombocytosis    Abdominal pain    Stress incontinence    Ulcer of toe, chronic, left, with unspecified severity ([facility])    Rheumatoid arthritis involving both feet, unspecified whether rheumatoid factor present ([address])    Symptomatic anemia    Headache    RLL pneumonia    Acute respiratory failure with hypoxia ([address])    Sepsis without acute organ dysfunction ([address])    Post concussion syndrome       Past Medical History  Past Medical History:   Diagnosis Date    Anemia     Anxiety     Arthritis     Back pain at L4-L5 level     Schreiber esophagus     Bulimia     Colon polyp     Disease of thyroid gland     hypothyroid    GERD (gastroesophageal reflux disease)     Hearing loss in left ear     History of transfusion     Hyperlipidemia     Hypertension     Hypothyroidism     Leukopenia     NMS (neuroleptic malignant syndrome) 01/03/2020    Pneumonia     RA (rheumatoid arthritis) ([address])     in feet    Sciatica     Seizure ([address])     due to medication mix up 8/2018 only one historically    Skin spots, red     lower right arm - poss from cat- no s/s infection    Synovial cyst of lumbar spine     Vertigo     Wears dentures     full set    Weight gain        Past Surgical History  Past Surgical History:   Procedure Laterality Date    BONE MARROW BIOPSY      BREAST SURGERY      enlargement with implants    CLOSED REDUCTION DISTAL FEMUR FRACTURE      COLONOSCOPY      COSMETIC SURGERY      DENTAL SURGERY      HIP ARTHROPLASTY Right 01/02/2020    Procedure: REVISION TOTAL HIP ARTHROPLASTY WITH REPAIR OF PARIPROSTHETIC FRACTURE - POSTERIOR APPROACH;  Surgeon: Ian Collins MD;  Location: [facility];  Service: Orthopedics    CT AMPUTATION METATARSAL W/TOE SINGLE Left 04/07/2023    Procedure: AMPUTATION TOE 4th;  Surgeon: Justen Nunn DPM;  Location: [address] [place] [address] MAIN OR;  Service: Podiatry    CT ARTHRP ACETBLR/PROX FEM PROSTC AGRFT/ALGRFT Right 12/11/2019    Procedure: ARTHROPLASTY HIP TOTAL ANTERIOR;  Surgeon: Ian Collins MD;  Location[facility];  Service: Orthopedics    CT ESOPHAGOGASTRODUODENOSCOPY TRANSORAL DIAGNOSTIC N/A 05/11/2021    Procedure: ESOPHAGOGASTRODUODENOSCOPY (EGD); Surgeon:  Pantera Cantor MD;  Location: [facility];  Service: General    CT LAPS RPR PARAESPHGL HRNA INCL FUNDPLSTY [address] N/A 05/11/2021    Procedure: REPAIR HERNIA PARAESOPHAGEAL LAPAROSCOPIC;  Surgeon: Thao Jimenez MD;  Location: [facility];  Service: General    MA UNLISTED PROCEDURE ESOPHAGUS N/A 05/11/2021    Procedure: FUNDOPLICATION TRANSORAL INCISIONLESS;  Surgeon: Jaylyn Shah MD;  Location: [facility];  Service: Gastroenterology    RHINOPLASTY      SINUS SURGERY      TOE AMPUTATION Left     2023 4th toe    TRANSORAL INCISIONLESS FUNDOPLICATION (TIF)  72/17/9761        09/11/23 4060   PT Last Visit   PT Visit Date 09/11/23   Note Type   Note type Evaluation   Pain Assessment   Pain Assessment Tool 0-10   Pain Score 8   Pain Location/Orientation Location: Head   Pain Onset/Description Descriptor: Headache   Restrictions/Precautions   Weight Bearing Precautions Per Order No   Other Precautions Pain; Fall Risk;Hard of hearing;Fluid restriction   Home Living   Type of Home House  (ranch)   Home Layout One level; Laundry in basement  (1 [address]ILIA, 10 stairs down to laundry in basement)   Home Equipment Walker;Cane   Prior Function   Level of [place] Independent with ADLs; Independent with functional mobility; Independent with IADLS  (I ambulation w/o AD)   Lives With Alone   Receives Help From Outpatient therapy  (pt attended 2 sessions OP PT for post-concussive symptoms, reports that it was helping + plans to continue upon d/c)   IADLs Independent with driving; Independent with meal prep; Independent with medication management   Falls in the last 6 months 1 to 4  (3 per pt - discussed fall risk preventative strategies + recommended to carry cell phone at all times in case of injury w/ fall)   Vocational On disability   General   Additional Pertinent History Cervical AROM WFL, pt denies pain in all planes.    Family/Caregiver Present No   Cognition   Arousal/Participation Alert   Orientation Level Oriented X4  (oriented to month + year)   Subjective   Subjective Pt agreeable to mobilize, reports dizziness at rest.   RLE Assessment   RLE Assessment WFL   LLE Assessment   LLE Assessment [facility] Vestibular   Vestibular Comments smooth pursuits and saccades in tact, pt did report dizziness w/ both   Coordination   Movements are Fluid and Coordinated 1   Bed Mobility   Additional Comments Pt greeted in chair. Transfers   Sit to Stand 7  Independent   Stand to Sit 7  Independent   Additional Comments no AD   Ambulation/Elevation   Gait pattern Improper Weight shift   Gait Assistance 5  Supervision   Assistive Device None   Distance 300'   Stair Management Assistance 5  Supervision   Stair Management Technique Two rails; Reciprocal   Number of Stairs 9   Balance   Static Sitting Good   Dynamic Sitting Fair +   Static Standing Fair   Dynamic Standing Fair -   Ambulatory Fair -   Endurance Deficit   Endurance Deficit No   Activity Tolerance   Activity Tolerance Patient tolerated treatment well   Assessment   Assessment Pt is 61 y.o. female seen for a PT evaluation s/p admit to [address] [place] [address] on 9/9/2023. Pt presenting w/ principal dx of hyponatremia. Please see above for other active problem list / PMH. PT now consulted to assess functional mobility and needs for safe d/c planning. Prior to admission, pt was I w/ ambulation w/o AD + lives alone I a 1SH w/ 1 FAMILIA. Currently pt is I for functional transfers; S for ambulation w/o AD; S for stair negotiation. Pt presents near functional baseline. Encouraged pt to mobilize at least 3x/day while in-house to prevent functional decline. No further skilled acute IPPT needs. Recommend for pt to continue w/ OP PT for post-concussive symptoms when medically cleared. Will d/c from caseload.    Plan   PT Frequency   (d/c PT)   Recommendation   PT Discharge Recommendation Home with outpatient rehabilitation  (continue OP PT)   AM-PAC Basic Mobility Inpatient   Turning in Flat Bed Without Bedrails 4   Lying on Back to Sitting on Edge of Flat Bed Without Bedrails 4   Moving Bed to Chair 4   Standing Up From Chair Using Arms 4   Walk in Room 4   Climb 3-5 Stairs With Railing 4   Basic Mobility Inpatient Raw Score 24   Basic Mobility Standardized Score 57.68   Highest Level Of Mobility   JH-HLM Goal 8: Walk 250 feet or more   JH-HLM Achieved 8: Walk 250 feet ot more   End of Consult   Patient Position at End of Consult Bedside chair; All needs within reach       Gunnar Valdes, PT, DPT

## 2023-09-11 NOTE — DISCHARGE INSTR - AVS FIRST PAGE
You had low sodium, which is now improved. Limit your fluid intake to 1800 mL per day. Continue all your medications as previously prescribed. Follow up with primary care within one week.

## 2023-09-11 NOTE — ASSESSMENT & PLAN NOTE
· Pt had head trauma 3 months ago and had HAs and blurred vision and nausea since then.   Improving with PT.

## 2023-09-11 NOTE — CASE MANAGEMENT
Case Management Assessment & Discharge Planning Note    Patient name Sam Tidwell  Location /-80 MRN [id number]  : 1960 Date 2023       Current Admission Date: 2023  Current Admission Diagnosis:Hyponatremia   Patient Active Problem List    Diagnosis Date Noted   • Post concussion syndrome 2023   • Sepsis without acute organ dysfunction ([address]) 2023   • RLL pneumonia 2023   • Acute respiratory failure with hypoxia ([address]) 2023   • Headache 2023   • Symptomatic anemia 2023   • Stress incontinence 2023   • Ulcer of toe, chronic, left, with unspecified severity ([address]) 2023   • Rheumatoid arthritis involving both feet, unspecified whether rheumatoid factor present ([address]) 2023   • Acute encephalopathy 2022   • Thrombocytosis 2022   • Abdominal pain 2022   • Acute cough 2022   • Obesity, Class I, BMI 30-34.9 2022   • Schreiber's esophagus 2022   • Microcytic anemia 2022   • Self neglect 2022   • Pruritus 2022   • Upper GI bleed 01/10/2022   • Nausea and vomiting 10/22/2021   • S/P tooth extraction 10/22/2021   • SIRS (systemic inflammatory response syndrome) ([address]) 2021   • Hyperlipidemia 2021   • Word finding difficulty 2021   • Hiatal hernia 2021   • Polyp of colon 2021   • Melena 2020   • Cellulitis of left upper extremity 2020   • Erosive esophagitis 2020   • Rash 2020   • Iron deficiency anemia 2020   • Multiple excoriations 2020   • Acute non-recurrent maxillary sinusitis 2020   • Dizziness 2020   • Fall 2020   • Esophagitis 2020   • Hyponatremia 2020   • Problem related to living arrangement 2020   • Hypokalemia    • NMS (neuroleptic malignant syndrome) 2020   • Preop exam for internal medicine 2019   • Severe iron deficiency anemia  2019   • Chronic bilateral low back pain without sciatica 11/01/2019   • Right hip pain 07/15/2019   • Essential hypertension 06/12/2019   • Dermal hypersensitivity reaction 11/08/2018   • Psychiatric disorder 08/21/2018   • Schizoaffective disorder ([address]) 08/16/2018   • Serotonin syndrome 08/13/2018   • Other fatigue 06/19/2018   • Spinal stenosis of lumbar region with neurogenic claudication 06/15/2018   • Lumbar spondylosis 06/15/2018   • T12 compression fracture ([address]) 06/15/2018   • Synovial cyst of lumbar facet joint 06/15/2018   • Localized osteoporosis with current pathological fracture with routine healing 05/25/2018   • Lumbar radiculopathy 03/19/2018   • DDD (degenerative disc disease), lumbar 03/19/2018   • Spondylolisthesis at L4-L5 level 03/19/2018   • Anxiety 10/31/2016   • Acquired hypothyroidism 10/31/2016   • Constipation 04/10/2014   • Bulimia nervosa in remission 03/19/2014      LOS (days): 2  Geometric Mean LOS (GMLOS) (days):   Days to GMLOS:     OBJECTIVE:    Risk of Unplanned Readmission Score: 23.68         Current admission status: Inpatient       Preferred Pharmacy:     [facility], [address] [place] [id number]  Phone: [phone] Fax: [phone]    Primary Care Provider: Crow Serra MD    Primary Insurance: [facility]  Secondary Insurance: [id number]:  [address], [address] [facility] [employer] - Mother   Primary Phone: [phone] (Mobile)  Home Phone: [phone]                              Patient Information  Admitted from[de-identified] Home  Mental Status: Alert  During Assessment patient was accompanied by: Not accompanied during assessment  Assessment information provided by[de-identified] Patient  Primary Caregiver: Self  Support Systems: Self  Home entry access options.  Select all that apply.: Stairs  Number of steps to enter home.: 2  Do the steps have railings?: No  Type of Current Residence: [place]  In the last 12 months, was there a time when you were not able to pay the mortgage or rent on time?: No  In the last 12 months, was there a time when you did not have a steady place to sleep or slept in a shelter (including now)?: No  Homeless/housing insecurity resource given?: N/A  Living Arrangements: Lives Alone    Activities of Daily Living Prior to Admission  Functional Status: Independent  Completes ADLs independently?: Yes  Ambulates independently?: Yes  Does patient use assisted devices?: No  Does patient currently own DME?: Yes  What DME does the patient currently own?: Lisa Muniz  Does patient have a history of Outpatient Therapy (PT/OT)?: No  Does the patient have a history of Short-Term Rehab?: No  Does patient have a history of HHC?: No  Does patient currently have [address]?: No         Patient Information Continued  Income Source: Pension/senior care  Does patient have prescription coverage?: Yes  Within the past 12 months, you worried that your food would run out before you got the money to buy more.: Never true  Within the past 12 months, the food you bought just didn't last and you didn't have money to get more.: Never true  Food insecurity resource given?: N/A  Does patient receive dialysis treatments?: No         Means of Transportation  Means of Transport to [facility][de-identified] Public Transportation - Lyft  In the past 12 months, has lack of transportation kept you from medical appointments or from getting medications?: No  In the past 12 months, has lack of transportation kept you from meetings, work, or from getting things needed for daily living?: No  Was application for public transport provided?: N/A        DISCHARGE DETAILS:  Met at bedside with patient. Lives alone in [address] IADL's, owns walker, cane. Will need Lyft at dc as has no one to drive her. IMM N/A. CM to continue to support.

## 2023-09-11 NOTE — UTILIZATION REVIEW
NOTIFICATION OF INPATIENT ADMISSION   AUTHORIZATION REQUEST   SERVICING FACILITY:   [address] [place] [address]  Address: [address] [place] [address]  Tax ID: [id number]  NPI: [id number] ATTENDING PROVIDER:  Attending Name and NPI#: Mel Ying [[id number]]  Address: [address] [place] [address]  Phone: [phone]   ADMISSION INFORMATION:  Place of Service: Inpatient [facility]  Place of Service Code: 21  Inpatient Admission Date/Time: 9/9/23  2:37 PM  Discharge Date/Time: No discharge date for patient encounter. Admitting Diagnosis Code/Description:  Dizziness [R42]  Lightheadedness [R42]  Hyponatremia [E87.1]     UTILIZATION REVIEW CONTACT:  Nancy Blevins Utilization   Network Utilization Review Department  Phone: [phone]  Fax: [phone]  Email: [email or web address]  Contact for approvals/pending authorizations, clinical reviews, and discharge. PHYSICIAN ADVISORY SERVICES:  Medical Necessity Denial & Tqgl-wm-Dgry Review  Phone: [phone]  Fax: [phone]  Email: [email or web address]

## 2023-09-11 NOTE — UTILIZATION REVIEW
Initial Clinical Review    Admission: Date/Time/Statement:   Admission Orders (From admission, onward)     Ordered        09/09/23 1437  INPATIENT ADMISSION  Once                      Orders Placed This Encounter   Procedures   • INPATIENT ADMISSION     Standing Status:   Standing     Number of Occurrences:   1     Order Specific Question:   Level of Care     Answer:   Med Surg [16]     Order Specific Question:   Estimated length of stay     Answer:   More than 2 Midnights     Order Specific Question:   Certification     Answer:   I certify that inpatient services are medically necessary for this patient for a duration of greater than two midnights. See H&P and MD Progress Notes for additional information about the patient's course of treatment. ED Arrival Information     Expected   9/9/2023     Arrival   9/9/2023 12:31    Acuity   Urgent            Means of arrival   Ambulance    Escorted by   [address] [place] [address] EMS    Service   Hospitalist    Admission type   Emergency            Arrival complaint   dizziness           Chief Complaint   Patient presents with   • Dizziness     Got a concussion in July of this year. Being treated for post concussion syndrome. Pt reports dizziness, HA, blurred vision at times. Denies CP or SOB        Initial Presentation: 61 y.o. female who presented to [address] [place] [address] ED due to headache, dizziness and nausea for one day. Pt states similar to concussion 3 mths ago, CT Head at that time was normal.  In ED, exam WNL. Labs revealed Na 122, serum osmo 257. PMHx:  concussion 3 months ago, schizoaffective disorder on multiple psychiatric meds. Plan:  Admit to Inpatient status dt Hyponatremia. Med surg, fu on U Na and Cl, serum uric acid, tsh, cortisol, bmp, continue home meds. Date: 9/10   Day 2:   Headache resolved. Continue fluid restriction, monitor electrolytes and replete prn, continue home meds.     9/10 Per Nephrology: acute vs chronic unknown, but with history of intermittent mild hyponatremia previously. Na improved to 132 s/p fluid restriction alone, fu on U Na and Cl, avoid IVF, repeat BMP, hypokalemia resolved following repletion. Continue to monitor BP, continue amlodipine. Monitor cbc. Date: 9/11    Day 3: Has surpassed a 2nd midnight with active treatments and services, which include labs, symptoms, CM following for dispo planning.     ED Triage Vitals   Temperature Pulse Respirations Blood Pressure SpO2   09/09/23 1243 09/09/23 1237 09/09/23 1237 09/09/23 1237 09/09/23 1237   98.2 °F (36.8 °C) 85 20 106/65 93 %      Temp Source Heart Rate Source Patient Position - Orthostatic VS BP Location FiO2 (%)   09/09/23 1243 09/09/23 1237 09/09/23 1237 09/09/23 1237 --   Oral Monitor Lying Right arm       Pain Score       09/09/23 1237       10 - Worst Possible Pain          Wt Readings from Last 1 Encounters:   09/09/23 82.1 kg (181 lb)     Additional Vital Signs:   Date/Time Temp Pulse Resp BP MAP (mmHg) SpO2 O2 Device Patient Position - Orthostatic VS   09/11/23 0926 -- -- -- 113/81 -- -- -- --   09/11/23 0727 97.2 °F (36.2 °C) Abnormal  91 16 128/81 97 94 % -- Lying   09/11/23 00:11:10 97.8 °F (36.6 °C) 64 16 126/74 91 91 % None (Room air) Lying   09/10/23 15:45:56 97.5 °F (36.4 °C) 75 20 114/76 89 95 % -- --   09/10/23 1032 -- -- -- -- -- -- None (Room air) --   09/10/23 07:16:15 97.9 °F (36.6 °C) 88 18 108/58 75 95 % -- --   09/10/23 0044 -- -- -- -- -- -- None (Room air) --   09/09/23 23:45:07 97.5 °F (36.4 °C) 71 -- 128/88 101 90 % -- --   09/09/23 1735 97.3 °F (36.3 °C) Abnormal  72 14 126/87 -- -- -- --   09/09/23 17:32:10 97.3 °F (36.3 °C) Abnormal  72 14 126/87 100 94 % -- --   09/09/23 1500 -- 72 20 114/71 86 94 % None (Room air) Sitting   09/09/23 1243 98.2 °F (36.8 °C) -- -- -- -- -- -- --   09/09/23 1237 -- 85 20 106/65 78 93 % None (Room air) Lying     Pertinent Labs/Diagnostic Test Results:   XR chest 2 views   Final Result by Hector Jenkins, MD (09/10 1106)      No acute cardiopulmonary disease.                   Workstation performed: [id number]               Results from last 7 days   Lab Units 09/11/23  0532 09/10/23  0603 09/09/23  1315   WBC Thousand/uL 2.65* 2.84* 3.84*   HEMOGLOBIN g/dL 13.3 13.6 12.1   HEMATOCRIT % 39.9 40.8 36.0   PLATELETS Thousands/uL 224 234 236   NEUTROS ABS Thousands/µL 1.53*  --  2.50         Results from last 7 days   Lab Units 09/11/23  0532 09/10/23  1737 09/10/23  0603 09/09/23 2107 09/09/23  1315   SODIUM mmol/L 132* 129* 132* 128* 122*   POTASSIUM mmol/L 4.0  --  4.0 3.4* 3.6   CHLORIDE mmol/L 100  --  98 95* 92*   CO2 mmol/L 27  --  26 28 25   ANION GAP mmol/L 5  --  8 5 5   BUN mg/dL 9  --  9 9 9   CREATININE mg/dL 0.72  --  0.71 0.84 0.64   EGFR ml/min/1.73sq m 89  --  90 74 95   CALCIUM mg/dL 9.2  --  9.4 9.7 9.0   MAGNESIUM mg/dL  --   --  2.2  --   --    PHOSPHORUS mg/dL  --   --  4.1  --   --      Results from last 7 days   Lab Units 09/09/23  1315   AST U/L 16   ALT U/L 18   ALK PHOS U/L 67   TOTAL PROTEIN g/dL 6.3*   ALBUMIN g/dL 3.9   TOTAL BILIRUBIN mg/dL 0.30         Results from last 7 days   Lab Units 09/11/23  0532 09/10/23  0603 09/09/23 2107 09/09/23  1315   GLUCOSE RANDOM mg/dL 84 84 111 87     Results from last 7 days   Lab Units 09/09/23  1315   OSMOLALITY, SERUM mmol/*     Results from last 7 days   Lab Units 09/09/23  1315   HS TNI 0HR ng/L <2     Results from last 7 days   Lab Units 09/09/23  1315   TSH 3RD GENERATON uIU/mL 2.010     Results from last 7 days   Lab Units 09/09/23  1457 09/09/23  1315   OSMOLALITY, SERUM mmol/KG  --  257*   OSMO UR mmol/KG 92*  --      Results from last 7 days   Lab Units 09/09/23  1457   SODIUM UR  <10     ED Treatment:   Medication Administration from 09/09/2023 1231 to 09/09/2023 1712       Date/Time Order Dose Route Action     09/09/2023 1704 EDT metoclopramide (REGLAN) injection 10 mg 10 mg Intravenous Given        Past Medical History:   Diagnosis Date   • Anemia    • Anxiety    • Arthritis    • Back pain at L4-L5 level    • Schreiber esophagus    • Bulimia    • Colon polyp    • Disease of thyroid gland     hypothyroid   • GERD (gastroesophageal reflux disease)    • Hearing loss in left ear    • History of transfusion    • Hyperlipidemia    • Hypertension    • Hypothyroidism    • Leukopenia    • NMS (neuroleptic malignant syndrome) 01/03/2020   • Pneumonia    • RA (rheumatoid arthritis) (HCC)     in feet   • Sciatica    • Seizure ([address])     due to medication mix up 8/2018 only one historically   • Skin spots, red     lower right arm - poss from cat- no s/s infection   • Synovial cyst of lumbar spine    • Vertigo    • Wears dentures     full set   • Weight gain      Present on Admission:  • Hyponatremia  • Schizoaffective disorder (HCC)  • Erosive esophagitis      Admitting Diagnosis: Dizziness [R42]  Lightheadedness [R42]  Hyponatremia [E87.1]  Age/Sex: 61 y.o. female  Admission Orders:  Scheduled Medications:  amLODIPine, 5 mg, Oral, Daily  celecoxib, 200 mg, Oral, Daily  vitamin B-12, 1,000 mcg, Oral, Daily  diphenhydrAMINE, 25 mg, Intravenous, Once  enoxaparin, 40 mg, Subcutaneous, Daily  ferrous sulfate, 325 mg, Oral, BID With Meals  folic acid, 479 mcg, Oral, Daily  levothyroxine, 25 mcg, Oral, Early Morning  ondansetron, 4 mg, Intravenous, Once  pantoprazole, 40 mg, Oral, BID AC  PARoxetine, 60 mg, Oral, Daily  QUEtiapine, 400 mg, Oral, HS  sucralfate, 1 g, Oral, TID With Meals  ziprasidone, 80 mg, Oral, Daily      Continuous IV Infusions: none     PRN Meds:  acetaminophen, 650 mg, Oral, Q6H PRN  calcium carbonate, 500 mg, Oral, TID PRN  LORazepam, 2 mg, Oral, Q6H PRN  triamcinolone, , Topical, BID PRN      scd  PT eval    IP CONSULT TO NEPHROLOGY    Network Utilization Review Department  ATTENTION: Please call with any questions or concerns to [phone] and carefully listen to the prompts so that you are directed to the right person.  All voicemails are confidential.  Chase Soto all requests for admission clinical reviews, approved or denied determinations and any other requests to dedicated fax number below belonging to the campus where the patient is receiving treatment.  List of dedicated fax numbers for the Facilities:  [facility] (Administrative/Medical Necessity) [phone] [address] (Maternity/NICU/Pediatrics) [phone]   [address] [place] [address] [phone]   [facility] [address] [facility] [phone] [address] [place] [address] [address] [place] [address] [phone]   [id number] [place] [address] [address] [address] [phone]

## 2023-09-11 NOTE — ASSESSMENT & PLAN NOTE
· Na improving. Was 122 on admission, 132 today  · D/w nephrology. Clear for d/c. Will discharge on fluid restriction.   Recommend f/u with PCP in one week

## 2023-09-11 NOTE — PLAN OF CARE
Problem: Potential for Falls  Goal: Patient will remain free of falls  Description: INTERVENTIONS:  - Educate patient/family on patient safety including physical limitations  - Instruct patient to call for assistance with activity   - Consult OT/PT to assist with strengthening/mobility   - Keep Call bell within reach  - Keep bed low and locked with side rails adjusted as appropriate  - Keep care items and personal belongings within reach  - Initiate and maintain comfort rounds  - Make Fall Risk Sign visible to staff  - Offer Toileting every  Hours, in advance of need  - Initiate/Maintain alarm  - Obtain necessary fall risk management equipment:   - Apply yellow socks and bracelet for high fall risk patients  - Consider moving patient to room near nurses station  Outcome: Progressing     Problem: MOBILITY - ADULT  Goal: Maintain or return to baseline ADL function  Description: INTERVENTIONS:  -  Assess patient's ability to carry out ADLs; assess patient's baseline for ADL function and identify physical deficits which impact ability to perform ADLs (bathing, care of mouth/teeth, toileting, grooming, dressing, etc.)  - Assess/evaluate cause of self-care deficits   - Assess range of motion  - Assess patient's mobility; develop plan if impaired  - Assess patient's need for assistive devices and provide as appropriate  - Encourage maximum independence but intervene and supervise when necessary  - Involve family in performance of ADLs  - Assess for home care needs following discharge   - Consider OT consult to assist with ADL evaluation and planning for discharge  - Provide patient education as appropriate  Outcome: Progressing  Goal: Maintains/Returns to pre admission functional level  Description: INTERVENTIONS:  - Perform BMAT or MOVE assessment daily.   - Set and communicate daily mobility goal to care team and patient/family/caregiver.    - Collaborate with rehabilitation services on mobility goals if consulted  - Perform Range of Motion times a day. - Reposition patient every  hours.   - Dangle patient  times a day  - Stand patient  times a day  - Ambulate patient  times a day  - Out of bed to chair  times a day   - Out of bed for meals  times a day  - Out of bed for toileting  - Record patient progress and toleration of activity level   Outcome: Progressing     Problem: PAIN - ADULT  Goal: Verbalizes/displays adequate comfort level or baseline comfort level  Description: Interventions:  - Encourage patient to monitor pain and request assistance  - Assess pain using appropriate pain scale  - Administer analgesics based on type and severity of pain and evaluate response  - Implement non-pharmacological measures as appropriate and evaluate response  - Consider cultural and social influences on pain and pain management  - Notify physician/advanced practitioner if interventions unsuccessful or patient reports new pain  Outcome: Progressing     Problem: INFECTION - ADULT  Goal: Absence or prevention of progression during hospitalization  Description: INTERVENTIONS:  - Assess and monitor for signs and symptoms of infection  - Monitor lab/diagnostic results  - Monitor all insertion sites, i.e. indwelling lines, tubes, and drains  - Monitor endotracheal if appropriate and nasal secretions for changes in amount and color  - [place] appropriate cooling/warming therapies per order  - Administer medications as ordered  - Instruct and encourage patient and family to use good hand hygiene technique  - Identify and instruct in appropriate isolation precautions for identified infection/condition  Outcome: Progressing  Goal: Absence of fever/infection during neutropenic period  Description: INTERVENTIONS:  - Monitor WBC    Outcome: Progressing     Problem: SAFETY ADULT  Goal: Patient will remain free of falls  Description: INTERVENTIONS:  - Educate patient/family on patient safety including physical limitations  - Instruct patient to call for assistance with activity   - Consult OT/PT to assist with strengthening/mobility   - Keep Call bell within reach  - Keep bed low and locked with side rails adjusted as appropriate  - Keep care items and personal belongings within reach  - Initiate and maintain comfort rounds  - Make Fall Risk Sign visible to staff  - Offer Toileting every  Hours, in advance of need  - Initiate/Maintain alarm  - Obtain necessary fall risk management equipment:   - Apply yellow socks and bracelet for high fall risk patients  - Consider moving patient to room near nurses station  Outcome: Progressing  Goal: Maintain or return to baseline ADL function  Description: INTERVENTIONS:  -  Assess patient's ability to carry out ADLs; assess patient's baseline for ADL function and identify physical deficits which impact ability to perform ADLs (bathing, care of mouth/teeth, toileting, grooming, dressing, etc.)  - Assess/evaluate cause of self-care deficits   - Assess range of motion  - Assess patient's mobility; develop plan if impaired  - Assess patient's need for assistive devices and provide as appropriate  - Encourage maximum independence but intervene and supervise when necessary  - Involve family in performance of ADLs  - Assess for home care needs following discharge   - Consider OT consult to assist with ADL evaluation and planning for discharge  - Provide patient education as appropriate  Outcome: Progressing  Goal: Maintains/Returns to pre admission functional level  Description: INTERVENTIONS:  - Perform BMAT or MOVE assessment daily.   - Set and communicate daily mobility goal to care team and patient/family/caregiver. - Collaborate with rehabilitation services on mobility goals if consulted  - Perform Range of Motion  times a day. - Reposition patient every  hours.   - Dangle patient times a day  - Stand patient  times a day  - Ambulate patient  times a day  - Out of bed to chair  times a day   - Out of bed for meals  times a day  - Out of bed for toileting  - Record patient progress and toleration of activity level   Outcome: Progressing     Problem: DISCHARGE PLANNING  Goal: Discharge to home or other facility with appropriate resources  Description: INTERVENTIONS:  - Identify barriers to discharge w/patient and caregiver  - Arrange for needed discharge resources and transportation as appropriate  - Identify discharge learning needs (meds, wound care, etc.)  - Arrange for interpretive services to assist at discharge as needed  - Refer to Case Management Department for coordinating discharge planning if the patient needs post-hospital services based on physician/advanced practitioner order or complex needs related to functional status, cognitive ability, or social support system  Outcome: Progressing     Problem: Knowledge Deficit  Goal: Patient/family/caregiver demonstrates understanding of disease process, treatment plan, medications, and discharge instructions  Description: Complete learning assessment and assess knowledge base.   Interventions:  - Provide teaching at level of understanding  - Provide teaching via preferred learning methods  Outcome: Progressing     Problem: SKIN/TISSUE INTEGRITY - ADULT  Goal: Skin Integrity remains intact(Skin Breakdown Prevention)  Description: Assess:  -Perform Dominik assessment every   -Clean and moisturize skin every   -Inspect skin when repositioning, toileting, and assisting with ADLS  -Assess under medical devices such as  every   -Assess extremities for adequate circulation and sensation     Bed Management:  -Have minimal linens on bed & keep smooth, unwrinkled  -Change linens as needed when moist or perspiring  -Avoid sitting or lying in one position for more than  hours while in bed  -Keep HOB at degrees     Toileting:  -Offer bedside commode  -Assess for incontinence every   -Use incontinent care products after each incontinent episode such as     Activity:  -Mobilize patient  times a day  -Encourage activity and walks on unit  -Encourage or provide ROM exercises   -Turn and reposition patient every  Hours  -Use appropriate equipment to lift or move patient in bed  -Instruct/ Assist with weight shifting every  when out of bed in chair  -Consider limitation of chair time  hour intervals    Skin Care:  -Avoid use of baby powder, tape, friction and shearing, hot water or constrictive clothing  -Relieve pressure over bony prominences using   -Do not massage red bony areas    Next Steps:  -Teach patient strategies to minimize risks such as    -Consider consults to  interdisciplinary teams such as   Outcome: Progressing  Goal: Incision(s), wounds(s) or drain site(s) healing without S/S of infection  Description: INTERVENTIONS  - Assess and document dressing, incision, wound bed, drain sites and surrounding tissue  - Provide patient and family education  - Perform skin care/dressing changes every   Outcome: Progressing  Goal: Pressure injury heals and does not worsen  Description: Interventions:  - Implement low air loss mattress or specialty surface (Criteria met)  - Apply silicone foam dressing  - Instruct/assist with weight shifting every  minutes when in chair   - Limit chair time to hour intervals  - Use special pressure reducing interventions such as  when in chair   - Apply fecal or urinary incontinence containment device   - Perform passive or active ROM every   - Turn and reposition patient & offload bony prominences every  hours   - Utilize friction reducing device or surface for transfers   - Consider consults to  interdisciplinary teams such as   - Use incontinent care products after each incontinent episode such as   - Consider nutrition services referral as needed  Outcome: Progressing

## 2023-09-11 NOTE — PROGRESS NOTES
NEPHROLOGY PROGRESS NOTE   Samantha Hughes 61 y.o. female MRN: [id number]  Unit/Bed#: -01 Encounter: [id number]        SUBJECTIVE:    Patient was seen today  Fluid restriction in place  Patient feels well  Tolerating p.o. intake well    12 point review of systems was otherwise negative besides what is mentioned above.     Medications:    Current Facility-Administered Medications:   •  acetaminophen (TYLENOL) tablet 650 mg, 650 mg, Oral, Q6H PRN, Jaren Diez, DO  •  amLODIPine (NORVASC) tablet 5 mg, 5 mg, Oral, Daily, Jaren Diez, DO  •  calcium carbonate (TUMS) chewable tablet 500 mg, 500 mg, Oral, TID PRN, Vicenta Carlson PA-C, 500 mg at 09/10/23 2869  •  celecoxib (CeleBREX) capsule 200 mg, 200 mg, Oral, Daily, Jaren Diez, DO, 200 mg at 09/11/23 1755  •  cyanocobalamin (VITAMIN B-12) tablet 1,000 mcg, 1,000 mcg, Oral, Daily, Jaren Dize DO, 1,000 mcg at 09/1960  •  diphenhydrAMINE (BENADRYL) injection 25 mg, 25 mg, Intravenous, Once, Jaren Diez DO  •  enoxaparin (LOVENOX) subcutaneous injection 40 mg, 40 mg, Subcutaneous, Daily, Jaren Diez DO, 40 mg at 09/11/23 3313  •  ferrous sulfate tablet 325 mg, 325 mg, Oral, BID With Meals, Jaren Diez DO, 325 mg at 84/09/75 1810  •  folic acid (FOLVITE) tablet 400 mcg, 400 mcg, Oral, Daily, Jaren Diez DO, 400 mcg at 09/11/23 3234  •  levothyroxine tablet 25 mcg, 25 mcg, Oral, Early Morning, Jaren Diez DO, 25 mcg at 09/11/23 5704  •  LORazepam (ATIVAN) tablet 2 mg, 2 mg, Oral, Q6H PRN, Jaren Diez DO  •  ondansetron (ZOFRAN) injection 4 mg, 4 mg, Intravenous, Once, Jaren Diez, DO  •  pantoprazole (PROTONIX) EC tablet 40 mg, 40 mg, Oral, BID AC, Jaren Diez DO, 40 mg at 09/11/23 7102  •  PARoxetine (PAXIL) tablet 60 mg, 60 mg, Oral, Daily, Jaren Diez DO, 60 mg at 09/11/23 0802  •  QUEtiapine (SEROquel XR) 24 hr tablet 400 mg, 400 mg, Oral, HS, Jaren Diez DO, 400 mg at 09/10/23 2114  •  sucralfate (CARAFATE) tablet 1 g, 1 g, Oral, TID With Meals, Elin Tabor DO, 1 g at 09/11/23 1521  •  triamcinolone (KENALOG) 0.1 % cream, , Topical, BID PRN, Elin Tabor DO  •  ziprasidone (GEODON) capsule 80 mg, 80 mg, Oral, Daily, Elin Tabor DO, 80 mg at 09/11/23 0925    OBJECTIVE:    Vitals:    09/10/23 1545 09/11/23 0011 09/11/23 0727 09/11/23 0926   BP: 114/76 126/74 128/81 113/81   BP Location:  Right arm Right arm    Pulse: 75 64 91    Resp: 20 16 16    Temp: 97.5 °F (36.4 °C) 97.8 °F (36.6 °C) (!) 97.2 °F (36.2 °C)    TempSrc:  Oral Oral    SpO2: 95% 91% 94%    Weight:       Height:            Temp:  [97.2 °F (36.2 °C)-97.8 °F (36.6 °C)] 97.2 °F (36.2 °C)  HR:  [64-91] 91  Resp:  [16-20] 16  BP: (113-128)/(74-81) 113/81  SpO2:  [91 %-95 %] 94 %     Body mass index is 33.11 kg/m². Weight (last 2 days)     Date/Time Weight    09/09/23 1735 82.1 (181)          I/O last 3 completed shifts:   In: 2230 [P.O.:2230]  Out: 1900 [Urine:1900]    I/O this shift:  In: 222 [P.O.:222]  Out: 800 [Urine:800]      Physical exam:    General: no acute distress, cooperative  Eyes: conjunctivae pink, anicteric sclerae  ENT: lips and mucous membranes moist, no exudates, normal external ears  Neck: ROM intact, no JVD  Chest: No respiratory distress, no accessory muscle use  CVS: normal rate, non pericardial friction rub  Abdomen: soft, non-tender, non-distended, normoactive bowel sounds  Extremities: no edema of both legs  Skin: no rash  Neuro: awake, alert, oriented, grossly intact  Psych:  Pleasant affect    Invasive Devices:      Lab Results:   Results from last 7 days   Lab Units 09/11/23  0532 09/10/23  0603 09/09/23  2107 09/09/23  1315   WBC Thousand/uL 2.65* 2.84*  --  3.84*   HEMOGLOBIN g/dL 13.3 13.6  --  12.1   HEMATOCRIT % 39.9 40.8  --  36.0   PLATELETS Thousands/uL 224 234  --  236   POTASSIUM mmol/L 4.0 4.0 3.4* 3.6   CHLORIDE mmol/L 100 98 95* 92*   CO2 mmol/L 27 26 28 25   BUN mg/dL 9 9 9 9   CREATININE mg/dL 0.72 0.71 0.84 0.64 CALCIUM mg/dL 9.2 9.4 9.7 9.0   MAGNESIUM mg/dL  --  2.2  --   --    PHOSPHORUS mg/dL  --  4.1  --   --    ALK PHOS U/L  --   --   --  67   ALT U/L  --   --   --  18   AST U/L  --   --   --  16       ASSESSMENT & PLAN    #1  Hyponatremia  • Sodium on admission is 122  • Improved to 132 status post fluid restriction  • NSAIDs and Paxil contributing  • Low urine sodium, low urine awesome suggestive of psychogenic polydipsia  • Will continue fluid restriction on discharge  • BMP in 1 week  • Follow-up with primary care physician if consistently low patient can be sent to nephrology  • Celebrex can can attribute as well may need to lower dose  • TSH was normal  • Cortisol is on the lower side if persistently low patient should have further work-up  #2  Hypokalemia  • Continue with DASH diet  #3  Postconcussion syndrome  • Continue to manage with neurology if needed avoid NSAIDs  #4  Hypertension  • Blood pressures look stable  • On amlodipine, 5 mg daily    DISCUSSION:    Patient was seen today discussed with Jet and we were in agreement given her sodium is above 132 okay for discharge cortisol is on the lower side and she should stay for further work-up however given her insistence on leaving would recommend repeating a BMP in 1 week and follow-up at that time    Portions of the record may have been created with voice recognition software. Occasional wrong word or "sound a like" substitutions may have occurred due to the inherent limitations of voice recognition software. Read the chart carefully and recognize, using context, where substitutions have occurred. If you have any questions, please contact the dictating provider.

## 2023-09-11 NOTE — NURSING NOTE
Nurse reviewed discharge information with patient, patient verbalizes understanding of follow up appointments, medication changes, and recommended fluid restriction. Discharged home via [facility].

## 2023-09-11 NOTE — DISCHARGE SUMMARY
[address] [facility]  Discharge- Juani Short 1960, 61 y.o. female MRN: [id number]  Unit/Bed#: -01 Encounter: [id number]  Primary Care Provider: Eric Henry MD   Date and time admitted to hospital: 9/9/2023 12:31 PM    * Hyponatremia  Assessment & Plan  · Na improving. Was 122 on admission, 132 today  · D/w nephrology. Clear for d/c. Will discharge on fluid restriction. Recommend f/u with PCP in one week     Post concussion syndrome  Assessment & Plan  · Pt had head trauma 3 months ago and had HAs and blurred vision and nausea since then. Improving with PT. Erosive esophagitis  Assessment & Plan  · C/w PPI and Carafate    Schizoaffective disorder ([address] )  Assessment & Plan  · C/w home meds -- of note, her chronic meds can cause hyponatremia. Would defer to primary provider managing these to make adjustments if needed. Medical Problems     Resolved Problems  Date Reviewed: 9/11/2023   None       Discharging Physician / Practitioner: Zeina Avalos PA-C  PCP: Eric Henry MD  Admission Date:   Admission Orders (From admission, onward)     Ordered        09/09/23 1437  [address]  Once                      Discharge Date: 09/11/23    Consultations During Hospital Stay:  · Nephrology     Procedures Performed:   · None    Significant Findings / Test Results:   · Hyponatremia, now improved     Incidental Findings:   · None     Test Results Pending at Discharge (will require follow up): · None     Outpatient Tests Requested:  · None, though recommend bmp in 1 week     Complications:  none    Reason for Admission: hyponatremia     Hospital Course:   Juani Short is a 61 y.o. female patient who originally presented to the hospital on 9/9/2023 due to headaches, nausea, dizziness. Has struggled with these sx since a fall she had a few months ago, which is overall improving with PT. She had worsening sx so came to ED.   Found to have acute on chronic hyponatremia, Na 122 on presentation. Nephrology consulted. Na has since improved with fluid restriction. D/w nephrology, cleared for d/c and recommend to continue fluid restriction on discharge. She is on chronic meds which can cause low sodium. Would defer to primary care/provider managing her meds on adjustment with these. Would however recommend to d/c celebrex. She can have repeat bmp in 1 week and f/u with PCP. She has no acute complaints today, feels ok. Juliet Beny to go home. Please see above list of diagnoses and related plan for additional information. Condition at Discharge: good    Discharge Day Visit / Exam:   Subjective:  No acute complaints today, feels fine. Does  Have intermittent lightheadedness and HA, but overall improving with PT and no worse today. Vitals: Blood Pressure: 113/81 (09/11/23 0926)  Pulse: 91 (09/11/23 0727)  Temperature: (!) 97.2 °F (36.2 °C) (09/11/23 0727)  Temp Source: Oral (09/11/23 0727)  Respirations: 16 (09/11/23 0727)  Height: 5' 2" (157.5 cm) (09/09/23 1735)  Weight - Scale: 82.1 kg (181 lb) (09/09/23 1735)  SpO2: 94 % (09/11/23 0727)  Exam:   Physical Exam  Vitals reviewed. Constitutional:       General: She is not in acute distress. Appearance: She is not toxic-appearing. HENT:      Head: Normocephalic and atraumatic. Eyes:      Extraocular Movements: Extraocular movements intact. Cardiovascular:      Rate and Rhythm: Normal rate and regular rhythm. Pulmonary:      Effort: Pulmonary effort is normal. No respiratory distress. Breath sounds: Normal breath sounds. Abdominal:      General: Bowel sounds are normal. There is no distension. Palpations: Abdomen is soft. Tenderness: There is no abdominal tenderness. Musculoskeletal:         General: Normal range of motion. Neurological:      General: No focal deficit present. Mental Status: She is alert and oriented to person, place, and time.    Psychiatric: Mood and Affect: Mood normal.         Behavior: Behavior normal.         Thought Content: Thought content normal.          Discussion with Family: pt. Discharge instructions/Information to patient and family:   See after visit summary for information provided to patient and family. Provisions for Follow-Up Care:  See after visit summary for information related to follow-up care and any pertinent home health orders. Disposition:   Home    Planned Readmission: no     Discharge Statement:  I spent 35 minutes discharging the patient. This time was spent on the day of discharge. I had direct contact with the patient on the day of discharge. Greater than 50% of the total time was spent examining patient, answering all patient questions, arranging and discussing plan of care with patient as well as directly providing post-discharge instructions. Additional time then spent on discharge activities. Discharge Medications:  See after visit summary for reconciled discharge medications provided to patient and/or family.       **Please Note: This note may have been constructed using a voice recognition system**

## 2023-09-11 NOTE — ASSESSMENT & PLAN NOTE
· C/w home meds -- of note, her chronic meds can cause hyponatremia. Would defer to primary provider managing these to make adjustments if needed.

## 2023-09-12 ENCOUNTER — HOSPITAL ENCOUNTER (OUTPATIENT)
Dept: RADIOLOGY | Facility: CLINIC | Age: 63
Discharge: HOME | End: 2023-09-12
Payer: COMMERCIAL

## 2023-09-12 ENCOUNTER — TRANSITIONAL CARE MANAGEMENT (OUTPATIENT)
Dept: INTERNAL MEDICINE CLINIC | Facility: CLINIC | Age: 63
End: 2023-09-12

## 2023-09-12 ENCOUNTER — TELEPHONE (OUTPATIENT)
Dept: PAIN MEDICINE | Facility: CLINIC | Age: 63
End: 2023-09-12

## 2023-09-12 ENCOUNTER — TELEPHONE (OUTPATIENT)
Dept: NEUROLOGY | Facility: CLINIC | Age: 63
End: 2023-09-12

## 2023-09-12 VITALS
RESPIRATION RATE: 18 BRPM | TEMPERATURE: 97 F | SYSTOLIC BLOOD PRESSURE: 147 MMHG | HEART RATE: 90 BPM | OXYGEN SATURATION: 97 % | DIASTOLIC BLOOD PRESSURE: 92 MMHG

## 2023-09-12 DIAGNOSIS — F41.9 ANXIETY: ICD-10-CM

## 2023-09-12 DIAGNOSIS — M47.816 LUMBAR SPONDYLOSIS: ICD-10-CM

## 2023-09-12 PROCEDURE — 64635 DESTROY LUMB/SAC FACET JNT: CPT | Performed by: ANESTHESIOLOGY

## 2023-09-12 PROCEDURE — 64636 DESTROY L/S FACET JNT ADDL: CPT | Performed by: ANESTHESIOLOGY

## 2023-09-12 RX ORDER — LIDOCAINE HYDROCHLORIDE 10 MG/ML
10 INJECTION, SOLUTION EPIDURAL; INFILTRATION; INTRACAUDAL; PERINEURAL ONCE
Status: COMPLETED | OUTPATIENT
Start: 2023-09-12 | End: 2023-09-12

## 2023-09-12 RX ORDER — LORAZEPAM 2 MG/1
2 TABLET ORAL EVERY 6 HOURS PRN
Qty: 100 TABLET | Refills: 1 | Status: SHIPPED | OUTPATIENT
Start: 2023-09-12

## 2023-09-12 RX ORDER — BUPIVACAINE HYDROCHLORIDE 5 MG/ML
3 INJECTION, SOLUTION EPIDURAL; INTRACAUDAL ONCE
Status: COMPLETED | OUTPATIENT
Start: 2023-09-12 | End: 2023-09-12

## 2023-09-12 RX ADMIN — LIDOCAINE HYDROCHLORIDE 3 ML: 20 INJECTION, SOLUTION EPIDURAL; INFILTRATION; INTRACAUDAL; PERINEURAL at 09:44

## 2023-09-12 RX ADMIN — LIDOCAINE HYDROCHLORIDE 10 ML: 10 INJECTION, SOLUTION EPIDURAL; INFILTRATION; INTRACAUDAL; PERINEURAL at 09:41

## 2023-09-12 RX ADMIN — BUPIVACAINE HYDROCHLORIDE 3 ML: 5 INJECTION, SOLUTION EPIDURAL; INTRACAUDAL; PERINEURAL at 09:44

## 2023-09-12 NOTE — H&P
History of Present Illness: The patient is a 61 y.o. female who presents with complaints of low back pain. Past Medical History:   Diagnosis Date   • Anemia    • Anxiety    • Arthritis    • Back pain at L4-L5 level    • Schreiber esophagus    • Bulimia    • Colon polyp    • Disease of thyroid gland     hypothyroid   • GERD (gastroesophageal reflux disease)    • Hearing loss in left ear    • History of transfusion    • Hyperlipidemia    • Hypertension    • Hypothyroidism    • Leukopenia    • NMS (neuroleptic malignant syndrome) 01/03/2020   • Pneumonia    • RA (rheumatoid arthritis) ([address])     in feet   • Sciatica    • Seizure ([address])     due to medication mix up 8/2018 only one historically   • Skin spots, red     lower right arm - poss from cat- no s/s infection   • Synovial cyst of lumbar spine    • Vertigo    • Wears dentures     full set   • Weight gain        Past Surgical History:   Procedure Laterality Date   • BONE MARROW BIOPSY     • BREAST SURGERY      enlargement with implants   • CLOSED REDUCTION DISTAL FEMUR FRACTURE     • COLONOSCOPY     • COSMETIC SURGERY     • DENTAL SURGERY     • HIP ARTHROPLASTY Right 01/02/2020    Procedure: REVISION TOTAL HIP ARTHROPLASTY WITH REPAIR OF PARIPROSTHETIC FRACTURE - POSTERIOR APPROACH;  Surgeon: Any Calderon MD;  Location: [facility] MAIN OR;  Service: Orthopedics   • WA AMPUTATION METATARSAL W/TOE SINGLE Left 04/07/2023    Procedure: AMPUTATION TOE 4th;  Surgeon: Candido Alcazar DPM;  Location: [address] [place] [address] MAIN OR;  Service: Podiatry   • WA ARTHRP ACETBLR/PROX FEM PROSTC AGRFT/ALGRFT Right 12/11/2019    Procedure: ARTHROPLASTY HIP TOTAL ANTERIOR;  Surgeon: Any Calderon MD;  Location: [facility] MAIN [facility];  Service: Orthopedics   • WA ESOPHAGOGASTRODUODENOSCOPY TRANSORAL DIAGNOSTIC N/A 05/11/2021    Procedure: ESOPHAGOGASTRODUODENOSCOPY (EGD); Surgeon:  Omid Chase MD;  Location: [facility] MAIN OR;  Service: General   • WA LAPS RPR PARAESPHGL HRNA INCL [facility] [address] N/A 05/11/2021 Procedure: REPAIR HERNIA PARAESOPHAGEAL  LAPAROSCOPIC;  Surgeon: Louis Bunch MD;  Location: [facility] MA[facility];  Service: General   • GA UNLISTED PROCEDURE ESOPHAGUS N/A 05/11/2021    Procedure: FUNDOPLICATION TRANSORAL INCISIONLESS;  Surgeon: Karrie Padilla MD;  Location: [facility];  Service: Gastroenterology   • RHINOPLASTY     • SINUS SURGERY     • TOE AMPUTATION Left     2023 4th toe   • TRANSORAL INCISIONLESS FUNDOPLICATION (TIF)  [id number]/14/6339         Current Outpatient Medications:   •  amLODIPine (NORVASC) 5 mg tablet, TAKE 1 TABLET (5 MG TOTAL) BY MOUTH DAILY. , Disp: 90 tablet, Rfl: 3  •  benzonatate (TESSALON PERLES) 100 mg capsule, Take 1 capsule (100 mg total) by mouth 3 (three) times a day as needed for cough, Disp: 30 capsule, Rfl: 5  •  ferrous sulfate 325 (65 Fe) mg tablet, Take 1 tablet (325 mg total) by mouth 2 (two) times a day with meals, Disp: 60 tablet, Rfl: 0  •  folic acid (FOLVITE) 641 mcg tablet, Take 1 tablet (400 mcg total) by mouth daily, Disp: 30 tablet, Rfl: 0  •  levothyroxine 25 mcg tablet, TAKE 1 TABLET BY MOUTH EVERY DAY, Disp: 90 tablet, Rfl: 3  •  LORazepam (ATIVAN) 2 mg tablet, Take 1 tablet (2 mg total) by mouth every 6 (six) hours as needed for anxiety, Disp: 120 tablet, Rfl: 1  •  pantoprazole (PROTONIX) 40 mg tablet, Take 1 tablet (40 mg total) by mouth 2 (two) times a day, Disp: 180 tablet, Rfl: 3  •  PARoxetine (PAXIL) 30 mg tablet, Take 2 tablets (60 mg total) by mouth in the morning, Disp: 180 tablet, Rfl: 3  •  QUEtiapine (SEROquel XR) 400 mg 24 hr tablet, Take 1 tablet (400 mg total) by mouth daily at bedtime, Disp: 90 tablet, Rfl: 3  •  sucralfate (CARAFATE) 1 g tablet, TAKE 1 TABLET (1 G TOTAL) BY MOUTH 3 (THREE) TIMES A DAY WITH MEALS, Disp: 270 tablet, Rfl: 3  •  triamcinolone (KENALOG) 0.1 % cream, APPLY TOPICALLY 2 TIMES A DAY AS NEEDED FOR RASH., Disp: 160 g, Rfl: 5  •  vitamin B-12 (VITAMIN B-12) 1,000 mcg tablet, Take 1 tablet (1,000 mcg total) by mouth daily, Disp: 90 tablet, Rfl: 1  •  ziprasidone (GEODON) 80 mg capsule, TAKE 1 CAPSULE BY MOUTH EVERY DAY, Disp: 90 capsule, Rfl: 3  No current facility-administered medications for this encounter. Allergies   Allergen Reactions   • Risperdal [Risperidone] Shortness Of Breath     Rapid heart beat, SOB   • Zyprexa [Olanzapine] Shortness Of Breath     Rapid heartbeat   • Latex Rash     Band aids, adhesives wears normal underwear, can eat bananas  USE PAPER TAPE       Physical Exam:   Vitals:    09/12/23 0919   BP: 147/90   Pulse: 85   Resp: 18   Temp: (!) 97 °F (36.1 °C)   SpO2: 98%     General: Awake, Alert, Oriented x 3, Mood and affect appropriate  Respiratory: Respirations even and unlabored  Cardiovascular: Peripheral pulses intact; no edema  Musculoskeletal Exam: Left lumbar paraspinals tender to palpation    ASA Score: 3    Patient/Chart Verification  Patient ID Verified: Verbal  ID Band Applied: No  Consents Confirmed: Procedural, To be obtained in the Pre-Procedure area  Interval H&P(within 24 hr) Complete (required for Outpatients and Surgery Admit only): To be obtained in the Pre-Procedure area  Allergies Reviewed: Yes  Anticoag/NSAID held?: NA  Currently on antibiotics?: No  Pregnancy denied?: NA    Assessment:   1.  Lumbar spondylosis        Plan: LEFT L3-5 RFA

## 2023-09-12 NOTE — TELEPHONE ENCOUNTER
ADD ON  Pt accepted appt tomorrow 9/13 @ 8:00 with Dr. Charmaine Jimenez in [facility].

## 2023-09-12 NOTE — DISCHARGE INSTRUCTIONS

## 2023-09-13 ENCOUNTER — RA CDI HCC (OUTPATIENT)
Dept: OTHER | Facility: HOSPITAL | Age: 63
End: 2023-09-13

## 2023-09-13 ENCOUNTER — APPOINTMENT (OUTPATIENT)
Dept: PHYSICAL THERAPY | Age: 63
End: 2023-09-13
Payer: COMMERCIAL

## 2023-09-13 ENCOUNTER — CONSULT (OUTPATIENT)
Dept: NEUROLOGY | Facility: CLINIC | Age: 63
End: 2023-09-13
Payer: COMMERCIAL

## 2023-09-13 VITALS
HEART RATE: 97 BPM | WEIGHT: 182.3 LBS | SYSTOLIC BLOOD PRESSURE: 146 MMHG | HEIGHT: 62 IN | TEMPERATURE: 97.3 F | DIASTOLIC BLOOD PRESSURE: 98 MMHG | BODY MASS INDEX: 33.55 KG/M2 | OXYGEN SATURATION: 99 %

## 2023-09-13 DIAGNOSIS — R41.3 MEMORY PROBLEM: ICD-10-CM

## 2023-09-13 DIAGNOSIS — F32.A ANXIETY AND DEPRESSION: ICD-10-CM

## 2023-09-13 DIAGNOSIS — H53.8 BLURRY VISION: ICD-10-CM

## 2023-09-13 DIAGNOSIS — S06.0X0A CONCUSSION WITHOUT LOSS OF CONSCIOUSNESS, INITIAL ENCOUNTER: Primary | ICD-10-CM

## 2023-09-13 DIAGNOSIS — R53.83 FATIGUE: ICD-10-CM

## 2023-09-13 DIAGNOSIS — G44.309 POST-TRAUMATIC HEADACHE: ICD-10-CM

## 2023-09-13 DIAGNOSIS — F41.9 ANXIETY AND DEPRESSION: ICD-10-CM

## 2023-09-13 DIAGNOSIS — H81.10 BPPV (BENIGN PAROXYSMAL POSITIONAL VERTIGO): ICD-10-CM

## 2023-09-13 PROCEDURE — 99205 OFFICE O/P NEW HI 60 MIN: CPT | Performed by: STUDENT IN AN ORGANIZED HEALTH CARE EDUCATION/TRAINING PROGRAM

## 2023-09-13 RX ORDER — MEMANTINE HYDROCHLORIDE 5 MG/1
TABLET ORAL
Qty: 120 TABLET | Refills: 6 | Status: SHIPPED | OUTPATIENT
Start: 2023-09-13

## 2023-09-13 RX ORDER — PREDNISONE 20 MG/1
TABLET ORAL
Qty: 12 TABLET | Refills: 0 | Status: SHIPPED | OUTPATIENT
Start: 2023-09-13 | End: 2023-09-19
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
## 2023-09-13 NOTE — PATIENT INSTRUCTIONS
- Please schedule a follow-up appointment for a dilated eye exam with your eye doctor    Additional Testing:   Labs: B12, Folate, Iron panel, Vit D    Headache Calendar  Please maintain a headache calendar  Consider using phone applications such as Migraine Skip or [employer] Migraine Tracker    Headache/migraine treatment:   Acute medications (for immediate treatment of a headache): It is ok to take acetaminophen (Tylenol) if they help your headaches you should limit these to No more than 2-3 times a week to avoid medication overuse/rebound headaches. Bridge  Prednisone Bridge  Day 1 and 2: 60mg (3 tablets)  Day 3 and 4: 40mg (2 tablets)  Day 5 and 6: 20mg (1 tablet)    Prescription preventive medications for headaches/migraines   (to take every day to help prevent headaches - not to take at the time of headache):  Memantine  Week 1: 5mg in the evening  Week 2: 5mg in the morning and 5mg in the evening  Week 3: 5mg in the morning and 10mg in the evening  Week 4: 10mg in the morning and 10mg in the evening - continue going forward    *Typically these types of medications take time until you see the benefit, although some may see improvement in days, often it may take weeks, especially if the medication is being titrated up to a beneficial level. Please contact us if there are any concerns or questions regarding the medication. Sleep and headache prevention:   [x] Melatonin - you may take 1-3 mg nightly for sleep or headache prevention. You should take this at sundown consistently every night for it to work. It works by gradually helping to adjust your sleep time over days to weeks, rather than immediately making you feel sleepy. Lifestyle Recommendations:  [x] SLEEP - Maintain a regular sleep schedule: Adults need at least 7-8 hours of uninterrupted a night.  Maintain good sleep hygiene:  Going to bed and waking up at consistent times, avoiding excessive daytime naps, avoiding caffeinated beverages in the evening, avoid excessive stimulation in the evening and generally using bed primarily for sleeping. One hour before bedtime would recommend turning lights down lower, decreasing your activity (may read quietly, listen to music at a low volume). When you get into bed, should eliminate all technology (no texting, emailing, playing with your phone, iPad or tablet in bed). [x] HYDRATION - Maintain good hydration. Drink  2L of fluid a day (4 typical small water bottles)  [x] DIET - Maintain good nutrition. In particular don't skip meals and try and eat healthy balanced meals regularly. [x] TRIGGERS - Look for other triggers and avoid them: Limit caffeine to 1-2 cups a day or less. Avoid dietary triggers that you have noticed bring on your headaches (this could include aged cheese, peanuts, MSG, aspartame and nitrates). [x] EXERCISE - physical exercise as we all know is good for you in many ways, and not only is good for your heart, but also is beneficial for your mental health, cognitive health and  chronic pain/headaches. I would encourage at the least 5 days of physical exercise weekly for at least 30 minutes. Education and Follow-up  [x] Please call with any questions or concerns. Of course if any new concerning symptoms go to the emergency department.   [x] Follow up in 4 months

## 2023-09-13 NOTE — PROGRESS NOTES
Review of Systems   Constitutional: Positive for fatigue. Negative for appetite change and fever. HENT: Negative. Negative for hearing loss, tinnitus, trouble swallowing and voice change. Eyes: Positive for photophobia (& sound). Negative for pain and visual disturbance. Blurry vision   Respiratory: Negative. Negative for shortness of breath. Cardiovascular: Negative. Negative for palpitations. Gastrointestinal: Positive for nausea and vomiting. Endocrine: Negative. Negative for cold intolerance. Genitourinary: Negative. Negative for dysuria, frequency and urgency. Musculoskeletal: Negative for back pain, gait problem, myalgias and neck pain. Skin: Negative. Negative for rash. Allergic/Immunologic: Negative. Neurological: Positive for dizziness and headaches. Negative for tremors, seizures, syncope, facial asymmetry, speech difficulty, weakness, light-headedness and numbness. Hematological: Negative. Does not bruise/bleed easily. Psychiatric/Behavioral: Negative. Negative for confusion, hallucinations and sleep disturbance. Memory   All other systems reviewed and are negative.

## 2023-09-13 NOTE — PROGRESS NOTES
[address] [facility] coding opportunities       Chart reviewed, no opportunity found: [id number]        Patients Insurance     Medicare Insurance: [employer] Advantage

## 2023-09-14 NOTE — ASSESSMENT & PLAN NOTE
Continue Protonix, and follow-up GI for pathology results of biopsies    Will check CBC and metabolic profile next week RN spoke to patient. See encounter in chart and on US results

## 2023-09-15 ENCOUNTER — TELEPHONE (OUTPATIENT)
Dept: HEMATOLOGY ONCOLOGY | Facility: CLINIC | Age: 63
End: 2023-09-15

## 2023-09-15 ENCOUNTER — TELEPHONE (OUTPATIENT)
Dept: NEUROLOGY | Facility: CLINIC | Age: 63
End: 2023-09-15

## 2023-09-15 PROBLEM — E66.9 OBESITY, CLASS I, BMI 30-34.9: Status: RESOLVED | Noted: 2022-05-31 | Resolved: 2023-09-15

## 2023-09-15 PROBLEM — E66.811 OBESITY, CLASS I, BMI 30-34.9: Status: RESOLVED | Noted: 2022-05-31 | Resolved: 2023-09-15

## 2023-09-15 LAB
25(OH)D3 SERPL-MCNC: 14 NG/ML (ref 30–100)
FOLATE SERPL-MCNC: 15.4 NG/ML
VIT B12 SERPL-MCNC: >2000 PG/ML (ref 200–1100)

## 2023-09-15 NOTE — TELEPHONE ENCOUNTER
Pt saw Dr. Cintia Hernandez on 9/13. Pt called in with a discrepancy on the after visit summary. Pt would like the Word "Obesity" removed from her after visit summary as this was not discussed at her appt and she does not want the word on her chart incase this becomes a court document.    Please assist.

## 2023-09-15 NOTE — TELEPHONE ENCOUNTER
Appointment Confirmation   Who are you speaking with? Patient   If it is not the patient, are they listed on an active communication consent form? N/A   Which provider is the appointment scheduled with? ABENA Mcnamara   When is the appointment scheduled? Please list date and time 9/25/23 @ 130pm   At which location is the appointment scheduled to take place? [place]   Did caller verbalize understanding of appointment details?  Yes

## 2023-09-15 NOTE — TELEPHONE ENCOUNTER
S/w the patient and she stated she is doing ok. The band aids are off and it seems fine. Reviewed the postoperative instructions and she appreciated the follow up.

## 2023-09-18 ENCOUNTER — HOSPITAL ENCOUNTER (OUTPATIENT)
Dept: RADIOLOGY | Facility: HOSPITAL | Age: 63
Discharge: HOME | End: 2023-09-18
Payer: COMMERCIAL

## 2023-09-18 DIAGNOSIS — R41.82 ALTERED MENTAL STATUS, UNSPECIFIED ALTERED MENTAL STATUS TYPE: ICD-10-CM

## 2023-09-18 DIAGNOSIS — R51.9 NONINTRACTABLE HEADACHE, UNSPECIFIED CHRONICITY PATTERN, UNSPECIFIED HEADACHE TYPE: ICD-10-CM

## 2023-09-18 DIAGNOSIS — R42 DIZZINESS: ICD-10-CM
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
## 2023-09-18 PROCEDURE — G1004 CDSM NDSC: HCPCS

## 2023-09-18 PROCEDURE — 70551 MRI BRAIN STEM W/O DYE: CPT

## 2023-09-22 PROBLEM — J18.9 RLL PNEUMONIA: Status: RESOLVED | Noted: 2023-07-23 | Resolved: 2023-09-22

## 2023-09-25 ENCOUNTER — OFFICE VISIT (OUTPATIENT)
Dept: HEMATOLOGY ONCOLOGY | Facility: CLINIC | Age: 63
End: 2023-09-25
Payer: COMMERCIAL

## 2023-09-25 VITALS
BODY MASS INDEX: 34.04 KG/M2 | DIASTOLIC BLOOD PRESSURE: 90 MMHG | TEMPERATURE: 98 F | HEART RATE: 83 BPM | HEIGHT: 62 IN | WEIGHT: 185 LBS | RESPIRATION RATE: 18 BRPM | SYSTOLIC BLOOD PRESSURE: 142 MMHG | OXYGEN SATURATION: 95 %

## 2023-09-25 DIAGNOSIS — D70.9 NEUTROPENIA, UNSPECIFIED TYPE (HCC): ICD-10-CM

## 2023-09-25 DIAGNOSIS — D50.9 MICROCYTIC ANEMIA: Primary | ICD-10-CM

## 2023-09-25 PROCEDURE — 99214 OFFICE O/P EST MOD 30 MIN: CPT

## 2023-09-26 ENCOUNTER — OFFICE VISIT (OUTPATIENT)
Dept: INTERNAL MEDICINE CLINIC | Facility: CLINIC | Age: 63
End: 2023-09-26
Payer: MEDICARE

## 2023-09-26 VITALS
HEIGHT: 62 IN | BODY MASS INDEX: 33.86 KG/M2 | SYSTOLIC BLOOD PRESSURE: 132 MMHG | HEART RATE: 75 BPM | DIASTOLIC BLOOD PRESSURE: 82 MMHG | WEIGHT: 184 LBS | OXYGEN SATURATION: 99 %

## 2023-09-26 DIAGNOSIS — R51.9 NONINTRACTABLE HEADACHE, UNSPECIFIED CHRONICITY PATTERN, UNSPECIFIED HEADACHE TYPE: Primary | ICD-10-CM

## 2023-09-26 DIAGNOSIS — I10 ESSENTIAL HYPERTENSION: ICD-10-CM

## 2023-09-26 DIAGNOSIS — Z12.4 SCREENING FOR CERVICAL CANCER: ICD-10-CM

## 2023-09-26 DIAGNOSIS — Z23 NEEDS FLU SHOT: ICD-10-CM

## 2023-09-26 DIAGNOSIS — R42 DIZZINESS: ICD-10-CM

## 2023-09-26 DIAGNOSIS — E03.9 ACQUIRED HYPOTHYROIDISM: ICD-10-CM

## 2023-09-26 DIAGNOSIS — Z12.31 ENCOUNTER FOR SCREENING MAMMOGRAM FOR BREAST CANCER: ICD-10-CM

## 2023-09-26 PROCEDURE — 99214 OFFICE O/P EST MOD 30 MIN: CPT | Performed by: INTERNAL MEDICINE

## 2023-09-26 PROCEDURE — 90471 IMMUNIZATION ADMIN: CPT

## 2023-09-26 PROCEDURE — 90686 IIV4 VACC NO PRSV 0.5 ML IM: CPT

## 2023-09-26 RX ORDER — PREDNISONE 20 MG/1
TABLET ORAL DAILY
COMMUNITY

## 2023-09-26 NOTE — PATIENT INSTRUCTIONS
Problem List Items Addressed This Visit          Endocrine    Acquired hypothyroidism     Continue levothyroxine along with monitoring         Relevant Medications    predniSONE 20 mg tablet       Cardiovascular and Mediastinum    Essential hypertension     Continue amlodipine along with monitoring. Other    Dizziness     Pt is getting good benefit with physical therapy. Headache - Primary     Neurology started prednisone to give her a course of this, memantine, and recommended she titrate off the Celebrex. I reviewed these recommendations again with patient. Patient is still taking the Celebrex once a day, I recommend she try every other day, then every third day, and so on decreasing the medication. Neurology mentioned the possibility of medication rebound headaches.           Other Visit Diagnoses       Screening for cervical cancer        Encounter for screening mammogram for breast cancer        Relevant Orders    Mammo screening bilateral w 3d & cad    Needs flu shot        Relevant Orders    influenza vaccine, quadrivalent, 0.5 mL, preservative-free, for adult and pediatric patients 6 mos+ (AFLURIA, FLUARIX, FLULAVAL, FLUZONE)

## 2023-09-26 NOTE — ASSESSMENT & PLAN NOTE
Neurology started prednisone to give her a course of this, memantine, and recommended she titrate off the Celebrex. I reviewed these recommendations again with patient. Patient is still taking the Celebrex once a day, I recommend she try every other day, then every third day, and so on decreasing the medication. Neurology mentioned the possibility of medication rebound headaches.

## 2023-09-26 NOTE — PROGRESS NOTES
Name: Saul Mills      : 1960      MRN: [id number]  Encounter Provider: Abran Boyd MD  Encounter Date: 2023   Encounter department: [facility]     1. Nonintractable headache, unspecified chronicity pattern, unspecified headache type  Assessment & Plan:  Neurology started prednisone to give her a course of this, memantine, and recommended she titrate off the Celebrex. I reviewed these recommendations again with patient. Patient is still taking the Celebrex once a day, I recommend she try every other day, then every third day, and so on decreasing the medication. Neurology mentioned the possibility of medication rebound headaches. 2. Screening for cervical cancer    3. Encounter for screening mammogram for breast cancer  -     Mammo screening bilateral w 3d & cad; Future; Expected date: 2023    4. Acquired hypothyroidism  Assessment & Plan:  Continue levothyroxine along with monitoring      5. Dizziness  Assessment & Plan:  Pt is getting good benefit with physical therapy. 6. Essential hypertension  Assessment & Plan:  Continue amlodipine along with monitoring. 7. Needs flu shot  -     influenza vaccine, quadrivalent, 0.5 mL, preservative-free, for adult and pediatric patients 6 mos+ (AFLURIA, FLUARIX, FLULAVAL, FLUZONE)           Subjective     Pt here regular follow up. Pt saw neurology for the headaches. They recommended titrating off Celebrex and they started Namenda and prednisone. Pt also saw the eye doctor for eval.     Review of Systems   Constitutional: Positive for fatigue. Negative for chills and fever. HENT: Negative for congestion, nosebleeds, postnasal drip, sore throat and trouble swallowing. Eyes: Negative for pain. Respiratory: Negative for cough, chest tightness, shortness of breath and wheezing. Cardiovascular: Negative for chest pain, palpitations and leg swelling.    Gastrointestinal: Negative for abdominal pain, constipation, diarrhea, nausea and vomiting. Endocrine: Negative for polydipsia and polyuria. Genitourinary: Negative for dysuria, flank pain and hematuria. Musculoskeletal: Positive for back pain (mild). Negative for arthralgias and myalgias. Skin: Negative for rash. Neurological: Negative for dizziness, tremors, light-headedness and headaches. Hematological: Does not bruise/bleed easily. Psychiatric/Behavioral: Negative for confusion and dysphoric mood. The patient is not nervous/anxious.         Past Medical History:   Diagnosis Date   • Anemia    • Anxiety    • Arthritis    • Back pain at L4-L5 level    • Schreiber esophagus    • Bulimia    • Colon polyp    • Disease of thyroid gland     hypothyroid   • GERD (gastroesophageal reflux disease)    • Hearing loss in left ear    • History of transfusion    • Hyperlipidemia    • Hypertension    • Hypothyroidism    • Leukopenia    • NMS (neuroleptic malignant syndrome) 01/03/2020   • Pneumonia    • RA (rheumatoid arthritis) ([address])     in feet   • Sciatica    • Seizure ([address])     due to medication mix up 8/2018 only one historically   • Skin spots, red     lower right arm - poss from cat- no s/s infection   • Synovial cyst of lumbar spine    • Vertigo    • Wears dentures     full set   • Weight gain      Past Surgical History:   Procedure Laterality Date   • BONE MARROW BIOPSY     • BREAST SURGERY      enlargement with implants   • CLOSED REDUCTION DISTAL FEMUR FRACTURE     • COLONOSCOPY     • COSMETIC SURGERY     • DENTAL SURGERY     • HIP ARTHROPLASTY Right 01/02/2020    Procedure: REVISION TOTAL HIP ARTHROPLASTY WITH REPAIR OF PARIPROSTHETIC FRACTURE - POSTERIOR APPROACH;  Surgeon: Angela Gooden MD;  Location:  MAIN OR;  Service: Orthopedics   • CA AMPUTATION METATARSAL W/TOE SINGLE Left 04/07/2023    Procedure: AMPUTATION TOE 4th;  Surgeon: Jane Sewell DPM;  Location: [facility] MAIN OR;  Service: Podiatry   • CA ARTHRP ACETBLR/PROX FEM PROSTC AGRFT/ALGRFT Right 12/11/2019    Procedure: ARTHROPLASTY HIP TOTAL ANTERIOR;  Surgeon: José Manuel Ta MD;  Location: [facility] MAIN OR;  Service: Orthopedics   • ID ESOPHAGOGASTRODUODENOSCOPY TRANSORAL DIAGNOSTIC N/A 05/11/2021    Procedure: ESOPHAGOGASTRODUODENOSCOPY (EGD); Surgeon: Pablo Fernandez MD;  Location: [facility] MAIN OR;  Service: General   • ID LAPS RPR PARAESPHGL HRNA INCL FUNDPLSTY [address] N/A 05/11/2021    Procedure: REPAIR HERNIA PARAESOPHAGEAL  LAPAROSCOPIC;  Surgeon:  Pablo Fernandez MD;  Location: [facility] MAIN OR;  Service: General   • ID UNLISTED PROCEDURE ESOPHAGUS N/A 05/11/2021    Procedure: FUNDOPLICATION TRANSORAL INCISIONLESS;  Surgeon: Charly Hunter MD;  Location: [facility] MAIN OR;  Service: Gastroenterology   • RHINOPLASTY     • SINUS SURGERY     • TOE AMPUTATION Left     2023 4th toe   • TRANSORAL INCISIONLESS FUNDOPLICATION (TIF)  90/73/5414     Family History   Problem Relation Age of Onset   • Uterine cancer Mother    • Esophageal cancer Father    • Colon cancer Maternal Grandmother      Social History     Socioeconomic History   • Marital status: Single     Spouse name: None   • Number of children: None   • Years of education: None   • Highest education level: None   Occupational History   • None   Tobacco Use   • Smoking status: Never   • Smokeless tobacco: Never   Vaping Use   • Vaping Use: Never used   Substance and Sexual Activity   • Alcohol use: Not Currently   • Drug use: Not Currently   • Sexual activity: Not Currently   Other Topics Concern   • None   Social History Narrative    Family disruption death of family member parent, per allscripts     Social Determinants of Health     Financial Resource Strain: Medium Risk (1/4/2023)    Overall Financial Resource Strain (CARDIA)    • Difficulty of Paying Living Expenses: Somewhat hard   Food Insecurity: No Food Insecurity (9/11/2023)    Hunger Vital Sign    • Worried About Running Out of Food in the Last Year: Never true    • Ran Out of Food in the Last Year: Never true   Transportation Needs: No Transportation Needs (9/11/2023)    PRAPARE - Transportation    • Lack of Transportation (Medical): No    • Lack of Transportation (Non-Medical): No   Physical Activity: Not on file   Stress: Stress Concern Present (5/18/2021)    [facility]    • Feeling of Stress : To some extent   Social Connections: Not on file   Intimate Partner Violence: Not on file   Housing Stability: Low Risk  (9/11/2023)    Housing Stability Vital Sign    • Unable to Pay for Housing in the Last Year: No    • Number of Places Lived in the Last Year: 1    • Unstable Housing in the Last Year: No     Current Outpatient Medications on File Prior to Visit   Medication Sig   • amLODIPine (NORVASC) 5 mg tablet TAKE 1 TABLET (5 MG TOTAL) BY MOUTH DAILY.    • benzonatate (TESSALON PERLES) 100 mg capsule Take 1 capsule (100 mg total) by mouth 3 (three) times a day as needed for cough   • ferrous sulfate 325 (65 Fe) mg tablet Take 1 tablet (325 mg total) by mouth 2 (two) times a day with meals   • folic acid (FOLVITE) 794 mcg tablet Take 1 tablet (400 mcg total) by mouth daily   • levothyroxine 25 mcg tablet TAKE 1 TABLET BY MOUTH EVERY DAY   • LORazepam (ATIVAN) 2 mg tablet Take 1 tablet (2 mg total) by mouth every 6 (six) hours as needed for anxiety   • memantine (NAMENDA) 5 mg tablet 5 mg PO Daily for 1 week, then 5 mg BID for 1 week, then 5 mg QAM and 10 mg QPM for 1 week; then 10 mg BID   • pantoprazole (PROTONIX) 40 mg tablet Take 1 tablet (40 mg total) by mouth 2 (two) times a day   • PARoxetine (PAXIL) 30 mg tablet Take 2 tablets (60 mg total) by mouth in the morning   • predniSONE 20 mg tablet Take by mouth daily   • QUEtiapine (SEROquel XR) 400 mg 24 hr tablet Take 1 tablet (400 mg total) by mouth daily at bedtime   • sucralfate (CARAFATE) 1 g tablet TAKE 1 TABLET (1 G TOTAL) BY MOUTH 3 (THREE) TIMES A DAY WITH MEALS   • triamcinolone (KENALOG) 0.1 % cream APPLY TOPICALLY 2 TIMES A DAY AS NEEDED FOR RASH. • vitamin B-12 (VITAMIN B-12) 1,000 mcg tablet Take 1 tablet (1,000 mcg total) by mouth daily   • ziprasidone (GEODON) 80 mg capsule TAKE 1 CAPSULE BY MOUTH EVERY DAY     Allergies   Allergen Reactions   • Risperdal [Risperidone] Shortness Of Breath     Rapid heart beat, SOB   • Zyprexa [Olanzapine] Shortness Of Breath     Rapid heartbeat   • Latex Rash     Band aids, adhesives wears normal underwear, can eat bananas  USE PAPER TAPE     Immunization History   Administered Date(s) Administered   • COVID-19 PFIZER VACCINE 0.3 ML IM 03/20/2021, 04/10/2021, 10/09/2021   • H1N1, All Formulations 11/17/2009   • INFLUENZA 11/17/2009, 01/04/2013, 11/11/2014, 10/20/2015, 10/31/2016, 09/16/2018   • Influenza Injectable, MDCK, Preservative Free, Quadrivalent, 0.5 mL 09/26/2019   • Influenza Quadrivalent, 6-35 Months IM 10/31/2016   • Influenza, injectable, quadrivalent, preservative free 0.5 mL 09/05/2020   • Influenza, recombinant, quadrivalent,injectable, preservative free 11/04/2021   • Pneumococcal Conjugate 13-Valent 09/16/2018   • Rabies 07/29/2014, 08/01/2014, 08/05/2014   • Tdap 07/29/2014, 12/17/2022       Objective     /82 (BP Location: Left arm, Patient Position: Sitting, Cuff Size: Large)   Pulse 75   Ht 5' 2" (1.575 m)   Wt 83.5 kg (184 lb)   SpO2 99%   BMI 33.65 kg/m²     Physical Exam  Vitals reviewed. Constitutional:       General: She is not in acute distress. Appearance: Normal appearance. She is well-developed. HENT:      Head: Normocephalic and atraumatic. Right Ear: External ear normal.      Left Ear: External ear normal.   Eyes:      General: No scleral icterus. Conjunctiva/sclera: Conjunctivae normal.   Neck:      Thyroid: No thyromegaly. Trachea: No tracheal deviation. Cardiovascular:      Rate and Rhythm: Normal rate and regular rhythm.       Heart sounds: Normal heart sounds. No murmur heard. Pulmonary:      Effort: Pulmonary effort is normal. No respiratory distress. Breath sounds: Normal breath sounds. No wheezing or rales. Abdominal:      General: Bowel sounds are normal.      Palpations: Abdomen is soft. Tenderness: There is no abdominal tenderness. There is no guarding or rebound. Musculoskeletal:      Cervical back: Normal range of motion and neck supple. Right lower leg: No edema. Left lower leg: No edema. Lymphadenopathy:      Cervical: No cervical adenopathy. Skin:     Coloration: Skin is not jaundiced or pale. Neurological:      General: No focal deficit present. Mental Status: She is alert and oriented to person, place, and time. Psychiatric:         Mood and Affect: Mood normal.         Behavior: Behavior normal.         Thought Content:  Thought content normal.         Judgment: Judgment normal.       Wilberto Love MD

## 2023-09-26 NOTE — PROGRESS NOTES
HEMATOLOGY / [address] [place] [address] FOLLOW UP NOTE    Primary Care Provider: Yadi Michaels MD  Referring Provider:    MRN: [id number]  : 1960    Reason for Encounter: Microcytic anemia       Interval History: Patient presents for follow-up of her microcytic anemia. Patient is status post IV iron infusions, Venofer 300 mg weekly x4 doses, last dose 2023. She tolerated the treatment fairly well. Patient still endorses fatigue noticing mild improvement. Denies SOB, CP, palpitations. No PICA. Patient denies abnormal bleeding: epistaxis, gingival bleeding, hematuria, dark tarry stools. Patient denies fevers, frequent infections, drenching night sweats, unintentional weight loss, decreased appetite. Patient recently suffered a concussion and is currently going through physical therapy. She is having frequent headaches, lightheadedness, dizziness. Labs:  9/15/2023: Hgb 12.8, MCV 84.8, platelets 307,026, WBC 2.0, ANC 1.01, serum iron 83, iron saturation 22%, ferritin 101. HPI:  Newton Plummer was seen for initial consultation 2023 regarding microcytic anemia. Patient has a PMH of Schreiber's esophagus, hiatal hernia, esophagitis, chronic anemia. She was recently hospitalized on  for dizziness, cough and vomiting. She was found to have a Hgb of 6.6, ferritin 3, serum iron 13, % saturation 2, Vitamin B12 562, Folate 13.5. She was transfused 1U PRBCs and IV iron. Patient declined further evaluation from GI inpatient and was to follow up with her Gastroenterologist upon discharge.       Dillon Méndez is c/o fatigue, dizziness, headaches. Denies PICA, RLS, blood in urine or stool. She is taking oral iron supplements daily.      Recent labs: Hgb 11.4, MCV 69, WBC 5.7, Platelets 930.     Patient had an EGD completed 2022 for her Schreiber's esophagus, no abnormal findings. Her last colonoscopy was , found to have a polyp, otherwise, unremarkable.        REVIEW OF SYSTEMS:  Please note that a 14-point review of systems was performed to include Constitutional, HEENT, Respiratory, CVS, GI, , Musculoskeletal, Integumentary, Neurologic, Rheumatologic, Endocrinologic, Psychiatric, Lymphatic, and Hematologic/Oncologic systems were reviewed and are negative unless otherwise stated in HPI. Positive and negative findings pertinent to this evaluation are incorporated into the history of present illness.       ECOG PS: 0    PROBLEM LIST:  Patient Active Problem List   Diagnosis   • Lumbar radiculopathy   • DDD (degenerative disc disease), lumbar   • Spondylolisthesis at L4-L5 level   • Localized osteoporosis with current pathological fracture with routine healing   • Spinal stenosis of lumbar region with neurogenic claudication   • Lumbar spondylosis   • T12 compression fracture ([facility])   • Synovial cyst of lumbar facet joint   • Anxiety   • Bulimia nervosa in remission   • Constipation   • Acquired hypothyroidism   • Other fatigue   • Serotonin syndrome   • Schizoaffective disorder (HCC)   • Psychiatric disorder   • Dermal hypersensitivity reaction   • Essential hypertension   • Right hip pain   • Chronic bilateral low back pain without sciatica   • Severe iron deficiency anemia    • Preop exam for internal medicine   • NMS (neuroleptic malignant syndrome)   • Hypokalemia   • Fall   • Esophagitis   • Hyponatremia   • Problem related to living arrangement   • Dizziness   • Acute non-recurrent maxillary sinusitis   • Iron deficiency anemia   • Multiple excoriations   • Rash   • Cellulitis of left upper extremity   • Erosive esophagitis   • Melena   • Hiatal hernia   • Polyp of colon   • Word finding difficulty   • SIRS (systemic inflammatory response syndrome) ([facility])   • Hyperlipidemia   • Nausea and vomiting   • S/P tooth extraction   • Upper GI bleed   • Pruritus   • Self neglect   • Microcytic anemia   • Schreiber's esophagus   • Acute cough   • Acute encephalopathy   • Thrombocytosis   • Abdominal pain • Stress incontinence   • Ulcer of toe, chronic, left, with unspecified severity (HCC)   • Rheumatoid arthritis involving both feet, unspecified whether rheumatoid factor present ([address])   • Symptomatic anemia   • Headache   • Acute respiratory failure with hypoxia (HCC)   • Sepsis without acute organ dysfunction [facility])   • Post concussion syndrome       Assessment / Plan: Reviewed patient's labs in detail. Her hemoglobin and iron studies have improved as have her symptoms. Patient is taking oral iron supplements, which she will continue. We discussed her low WBC and ANC counts. Patient is asymptomatic at this time. Patient reports she is aware of her low counts and has discussed it with her PCP on prior occasions. On previous studies, her Ashtyn was stable and informed her that this is the lowest its been. At this time, she is neutropenic. We reviewed her medications in detail. Informed her to her Seroquel, Geodon, memantine, all have adverse effect of leukopenia. Patient reports she has not started the memantine at this time. Advised her to speak with her PCP regarding possible dose reduction to her medications. Since patient's mental health is stable on current doses, I am hesitant on dose reductions at this time however, will defer this decision to her PCP. In the meantime I will monitor her counts monthly and see her back in 2 months for close monitoring. Patient agreeable with plan. She is aware to contact us for worsening symptoms and/or if she has any additional questions or concerns. I spent 30 minutes on chart review, face to face counseling time, coordination of care and documentation.     Past Medical History:   has a past medical history of Anemia, Anxiety, Arthritis, Back pain at L4-L5 level, Schreiber esophagus, Bulimia, Colon polyp, Disease of thyroid gland, GERD (gastroesophageal reflux disease), Hearing loss in left ear, History of transfusion, Hyperlipidemia, Hypertension, Hypothyroidism, Leukopenia, NMS (neuroleptic malignant syndrome) (01/03/2020), Pneumonia, RA (rheumatoid arthritis) ([address]), Sciatica, Seizure ([address]), Skin spots, red, Synovial cyst of lumbar spine, Vertigo, Wears dentures, and Weight gain. PAST SURGICAL HISTORY:   has a past surgical history that includes Rhinoplasty; Bone marrow biopsy; Colonoscopy; pr arthrp acetblr/prox fem prostc agrft/algrft (Right, 12/11/2019); Hip Arthroplasty (Right, 01/02/2020); Sinus surgery; Cosmetic surgery; Closed reduction distal femur fracture; pr laps rpr paraesphgl hrna incl fundplsty w/mesh (N/A, 05/11/2021); pr esophagogastroduodenoscopy transoral diagnostic (N/A, 05/11/2021); pr unlisted procedure esophagus (N/A, 05/11/2021); Transoral incisionless fundoplication (TIF) (10/80/3007); Breast surgery; Dental surgery; pr amputation metatarsal w/toe single (Left, 04/07/2023); and Toe amputation (Left). CURRENT MEDICATIONS  Current Outpatient Medications   Medication Sig Dispense Refill   • amLODIPine (NORVASC) 5 mg tablet TAKE 1 TABLET (5 MG TOTAL) BY MOUTH DAILY.  90 tablet 3   • benzonatate (TESSALON PERLES) 100 mg capsule Take 1 capsule (100 mg total) by mouth 3 (three) times a day as needed for cough 30 capsule 5   • ferrous sulfate 325 (65 Fe) mg tablet Take 1 tablet (325 mg total) by mouth 2 (two) times a day with meals 60 tablet 0   • folic acid (FOLVITE) 439 mcg tablet Take 1 tablet (400 mcg total) by mouth daily 30 tablet 0   • levothyroxine 25 mcg tablet TAKE 1 TABLET BY MOUTH EVERY DAY 90 tablet 3   • LORazepam (ATIVAN) 2 mg tablet Take 1 tablet (2 mg total) by mouth every 6 (six) hours as needed for anxiety 100 tablet 1   • memantine (NAMENDA) 5 mg tablet 5 mg PO Daily for 1 week, then 5 mg BID for 1 week, then 5 mg QAM and 10 mg QPM for 1 week; then 10 mg  tablet 6   • pantoprazole (PROTONIX) 40 mg tablet Take 1 tablet (40 mg total) by mouth 2 (two) times a day 180 tablet 3   • PARoxetine (PAXIL) 30 mg tablet Take 2 tablets (60 mg total) by mouth in the morning 180 tablet 3   • QUEtiapine (SEROquel XR) 400 mg 24 hr tablet Take 1 tablet (400 mg total) by mouth daily at bedtime 90 tablet 3   • sucralfate (CARAFATE) 1 g tablet TAKE 1 TABLET (1 G TOTAL) BY MOUTH 3 (THREE) TIMES A DAY WITH MEALS 270 tablet 3   • triamcinolone (KENALOG) 0.1 % cream APPLY TOPICALLY 2 TIMES A DAY AS NEEDED FOR RASH. 160 g 5   • vitamin B-12 (VITAMIN B-12) 1,000 mcg tablet Take 1 tablet (1,000 mcg total) by mouth daily 90 tablet 1   • ziprasidone (GEODON) 80 mg capsule TAKE 1 CAPSULE BY MOUTH EVERY DAY 90 capsule 3     No current facility-administered medications for this visit. [unfilled]    SOCIAL HISTORY:   reports that she has never smoked. She has never used smokeless tobacco. She reports that she does not currently use alcohol. She reports that she does not currently use drugs. FAMILY HISTORY:  family history includes Colon cancer in her maternal grandmother; Esophageal cancer in her father; Uterine cancer in her mother. ALLERGIES:  is allergic to risperdal [risperidone], zyprexa [olanzapine], and latex. Physical Exam:  Vital Signs:   Visit Vitals  /90 (BP Location: Left arm, Patient Position: Sitting, Cuff Size: Adult)   Pulse 83   Temp 98 °F (36.7 °C)   Resp 18   Ht 5' 2" (1.575 m)   Wt 83.9 kg (185 lb)   SpO2 95%   BMI 33.84 kg/m²   OB Status Postmenopausal   Smoking Status Never   BSA 1.85 m²     Body mass index is 33.84 kg/m². Body surface area is 1.85 meters squared. GEN: Alert, awake oriented x3, in no acute distress  HEENT- No pallor, icterus, cyanosis, no oral mucosal lesions,   LAD - no palpable cervical, clavicle, axillary, inguinal LAD  Heart- normal S1 S2, regular rate and rhythm, No murmur, rubs.    Lungs- clear breathing sound bilateral.   Abdomen- soft, Non tender, bowel sounds present  Extremities- No cyanosis, clubbing, edema  Neuro- No focal neurological deficit    Labs:  Lab Results   Component Value Date    WBC 2.65 (L) 09/11/2023    HGB 13.3 09/11/2023    HCT 39.9 09/11/2023    MCV 85 09/11/2023     09/11/2023     Lab Results   Component Value Date     06/01/2017    SODIUM 132 (L) 09/11/2023    K 4.0 09/11/2023     09/11/2023    CO2 27 09/11/2023    AGAP 5 09/11/2023    BUN 9 09/11/2023    CREATININE 0.72 09/11/2023    GLUC 84 09/11/2023    GLUF 126 (H) 02/21/2022    CALCIUM 9.2 09/11/2023    AST 16 09/09/2023    ALT 18 09/09/2023    ALKPHOS 67 09/09/2023    PROT 7.0 06/01/2017    TP 6.3 (L) 09/09/2023    BILITOT 0.2 06/01/2017    TBILI 0.30 09/09/2023    EGFR 89 09/11/2023

## 2023-10-02 ENCOUNTER — APPOINTMENT (EMERGENCY)
Dept: RADIOLOGY | Facility: HOSPITAL | Age: 63
End: 2023-10-02
Payer: COMMERCIAL

## 2023-10-02 ENCOUNTER — HOSPITAL ENCOUNTER (EMERGENCY)
Facility: HOSPITAL | Age: 63
Discharge: HOME | End: 2023-10-02
Attending: EMERGENCY MEDICINE
Payer: COMMERCIAL

## 2023-10-02 VITALS
DIASTOLIC BLOOD PRESSURE: 75 MMHG | HEART RATE: 89 BPM | SYSTOLIC BLOOD PRESSURE: 121 MMHG | WEIGHT: 187.83 LBS | RESPIRATION RATE: 16 BRPM | TEMPERATURE: 98.5 F | OXYGEN SATURATION: 95 % | BODY MASS INDEX: 34.35 KG/M2

## 2023-10-02 DIAGNOSIS — S89.91XA INJURY OF RIGHT KNEE, INITIAL ENCOUNTER: ICD-10-CM
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
## 2023-10-02 PROCEDURE — 73564 X-RAY EXAM KNEE 4 OR MORE: CPT

## 2023-10-02 PROCEDURE — 99283 EMERGENCY DEPT VISIT LOW MDM: CPT

## 2023-10-02 PROCEDURE — 99284 EMERGENCY DEPT VISIT MOD MDM: CPT | Performed by: EMERGENCY MEDICINE

## 2023-10-02 RX ORDER — ACETAMINOPHEN 325 MG/1
650 TABLET ORAL ONCE
Status: COMPLETED | OUTPATIENT
Start: 2023-10-02 | End: 2023-10-02

## 2023-10-02 RX ADMIN — ACETAMINOPHEN 650 MG: 325 TABLET, FILM COATED ORAL at 10:33

## 2023-10-02 NOTE — ED PROVIDER NOTES
History  Chief Complaint   Patient presents with   • Fall     Pt presents to the s/p mechanical fall. Tripped and fall, c/o of right knee pain. Denies head injury and denies loc. HPI    51-year-old female with history of concussion presents with right knee pain after mechanical fall. She reports feeling dizzy, after which she tripped on the curb and hit her right knee on the concrete. No head strike, no LOC, she is not on any blood thinners. Unknown if she could walk afterwards, as bystanders told her not to move, she was brought in by EMS. She reports lightheadedness has been constant since her concussion in July, and is being worked up by neurologist for vestibular disturbances. Lightheadedness is currently improving. Denies headache, blurry vision, numbness in LE. Last tetanus shot was 3 weeks ago. She was able to ambulate in the ED, and has full ROM, and strength and sensation in R knee and R LE. No swelling noted on exam.     Prior to Admission Medications   Prescriptions Last Dose Informant Patient Reported? Taking? LORazepam (ATIVAN) 2 mg tablet  Self No No   Sig: Take 1 tablet (2 mg total) by mouth every 6 (six) hours as needed for anxiety   PARoxetine (PAXIL) 30 mg tablet  Self No No   Sig: Take 2 tablets (60 mg total) by mouth in the morning   QUEtiapine (SEROquel XR) 400 mg 24 hr tablet  Self No No   Sig: Take 1 tablet (400 mg total) by mouth daily at bedtime   amLODIPine (NORVASC) 5 mg tablet  Self No No   Sig: TAKE 1 TABLET (5 MG TOTAL) BY MOUTH DAILY.    benzonatate (TESSALON PERLES) 100 mg capsule  Self No No   Sig: Take 1 capsule (100 mg total) by mouth 3 (three) times a day as needed for cough   ferrous sulfate 325 (65 Fe) mg tablet  Self No No   Sig: Take 1 tablet (325 mg total) by mouth 2 (two) times a day with meals   folic acid (FOLVITE) 817 mcg tablet  Self No No   Sig: Take 1 tablet (400 mcg total) by mouth daily   levothyroxine 25 mcg tablet  Self No No   Sig: TAKE 1 TABLET BY MOUTH EVERY DAY   memantine (NAMENDA) 5 mg tablet   No No   Si mg PO Daily for 1 week, then 5 mg BID for 1 week, then 5 mg QAM and 10 mg QPM for 1 week; then 10 mg BID   pantoprazole (PROTONIX) 40 mg tablet  Self No No   Sig: Take 1 tablet (40 mg total) by mouth 2 (two) times a day   predniSONE 20 mg tablet   Yes No   Sig: Take by mouth daily   sucralfate (CARAFATE) 1 g tablet  Self No No   Sig: TAKE 1 TABLET (1 G TOTAL) BY MOUTH 3 (THREE) TIMES A DAY WITH MEALS   triamcinolone (KENALOG) 0.1 % cream  Self No No   Sig: APPLY TOPICALLY 2 TIMES A DAY AS NEEDED FOR RASH.    vitamin B-12 (VITAMIN B-12) 1,000 mcg tablet  Self No No   Sig: Take 1 tablet (1,000 mcg total) by mouth daily   ziprasidone (GEODON) 80 mg capsule  Self No No   Sig: TAKE 1 CAPSULE BY MOUTH EVERY DAY      Facility-Administered Medications: None       Past Medical History:   Diagnosis Date   • Anemia    • Anxiety    • Arthritis    • Back pain at L4-L5 level    • Schreiber esophagus    • Bulimia    • Colon polyp    • Disease of thyroid gland     hypothyroid   • GERD (gastroesophageal reflux disease)    • Hearing loss in left ear    • History of transfusion    • Hyperlipidemia    • Hypertension    • Hypothyroidism    • Leukopenia    • NMS (neuroleptic malignant syndrome) 2020   • Pneumonia    • RA (rheumatoid arthritis) ([address])     in feet   • Sciatica    • Seizure ([address])     due to medication mix up 2018 only one historically   • Skin spots, red     lower right arm - poss from cat- no s/s infection   • Synovial cyst of lumbar spine    • Vertigo    • Wears dentures     full set   • Weight gain        Past Surgical History:   Procedure Laterality Date   • BONE MARROW BIOPSY     • BREAST SURGERY      enlargement with implants   • CLOSED REDUCTION DISTAL FEMUR FRACTURE     • COLONOSCOPY     • COSMETIC SURGERY     • DENTAL SURGERY     • HIP ARTHROPLASTY Right 2020    Procedure: REVISION TOTAL HIP ARTHROPLASTY WITH REPAIR OF PARIPROSTHETIC FRACTURE - POSTERIOR APPROACH;  Surgeon: Vin Lopez MD;  Location: [facility] MA[facility];  Service: Orthopedics   • VA AMPUTATION METATARSAL W/TOE SINGLE Left 04/07/2023    Procedure: AMPUTATION TOE 4th;  Surgeon: Nancy Chopra DPM;  [facility] MAIN OR;  Service: Podiatry   • VA ARTHRP ACETBLR/PROX FEM PROSTC AGRFT/ALGRFT Right 12/11/2019    Procedure: ARTHROPLASTY HIP TOTAL ANTERIOR;  Surgeon: Vin Lopez MD;  Location: [facility];  Service: Orthopedics   • VA ESOPHAGOGASTRODUODENOSCOPY TRANSORAL DIAGNOSTIC N/A 05/11/2021    Procedure: ESOPHAGOGASTRODUODENOSCOPY (EGD); Surgeon: Mary Brothers MD;  Location: [facility];  Service: General   • VA LAPS RPR PARAESPHGL HRNA INCL FUNDPLSTY [address] N/A 05/11/2021    Procedure: REPAIR HERNIA PARAESOPHAGEAL  LAPAROSCOPIC;  Surgeon: Mary Brothers MD;  Location: [facility];  Service: General   • VA UNLISTED PROCEDURE ESOPHAGUS N/A 05/11/2021    Procedure: FUNDOPLICATION TRANSORAL INCISIONLESS;  Surgeon: Pat Pacheco MD;  Location: [facility];  Service: Gastroenterology   • RHINOPLASTY     • SINUS SURGERY     • TOE AMPUTATION Left     2023 4th toe   • TRANSORAL INCISIONLESS FUNDOPLICATION (TIF)  40/36/7806       Family History   Problem Relation Age of Onset   • Uterine cancer Mother    • Esophageal cancer Father    • Colon cancer Maternal Grandmother      I have reviewed and agree with the history as documented. E-Cigarette/Vaping   • E-Cigarette Use Never User      E-Cigarette/Vaping Substances   • Nicotine No    • THC No    • CBD No    • Flavoring No    • Other No    • Unknown No      Social History     Tobacco Use   • Smoking status: Never   • Smokeless tobacco: Never   Vaping Use   • Vaping Use: Never used   Substance Use Topics   • Alcohol use: Not Currently   • Drug use: Not Currently        Review of Systems   Constitutional: Negative for chills and fever. HENT: Negative for ear pain and sore throat. Eyes: Negative for pain and visual disturbance. Respiratory: Negative for cough and shortness of breath. Cardiovascular: Negative for chest pain and palpitations. Gastrointestinal: Negative for abdominal pain and vomiting. Musculoskeletal: Positive for arthralgias (R knee). Negative for back pain and gait problem. Neurological: Positive for light-headedness (Improving). Negative for seizures, syncope and headaches. All other systems reviewed and are negative. Physical Exam  ED Triage Vitals   Temperature Pulse Respirations Blood Pressure SpO2   10/02/23 0954 10/02/23 0956 10/02/23 0956 10/02/23 0956 10/02/23 0956   98.5 °F (36.9 °C) 89 16 121/75 95 %      Temp Source Heart Rate Source Patient Position - Orthostatic VS BP Location FiO2 (%)   10/02/23 0954 10/02/23 0956 10/02/23 0956 10/02/23 0956 --   Oral Monitor Sitting Right arm       Pain Score       10/02/23 0956       9             Orthostatic Vital Signs  Vitals:    10/02/23 0956   BP: 121/75   Pulse: 89   Patient Position - Orthostatic VS: Sitting       Physical Exam  Vitals and nursing note reviewed. Constitutional:       General: She is not in acute distress. Appearance: She is well-developed. HENT:      Head: Normocephalic and atraumatic. Eyes:      Conjunctiva/sclera: Conjunctivae normal.   Cardiovascular:      Rate and Rhythm: Normal rate and regular rhythm. Heart sounds: No murmur heard. Pulmonary:      Effort: Pulmonary effort is normal. No respiratory distress. Breath sounds: Normal breath sounds. Abdominal:      Palpations: Abdomen is soft. Tenderness: There is no abdominal tenderness. Musculoskeletal:         General: No swelling. Cervical back: Neck supple. Right knee: Bony tenderness present. No swelling or effusion. Comments: Mild tenderness to patella, Full ROM in R knee, able to ambulate. Abrasions to R knee noted, but no bleeding, swelling, or increased warmth. Skin:     General: Skin is warm and dry.       Capillary Refill: Capillary refill takes less than 2 seconds. Neurological:      General: No focal deficit present. Mental Status: She is alert and oriented to person, place, and time. Psychiatric:         Mood and Affect: Mood normal.         ED Medications  Medications   acetaminophen (TYLENOL) tablet 650 mg (650 mg Oral Given 10/2/23 1033)       Diagnostic Studies  Results Reviewed     None                 XR knee 4+ vw right injury    (Results Pending)         Procedures  Procedures      ED Course  ED Course as of 10/02/23 1125   Mon Oct 02, 2023   1037 Given hx and exam findings, differentials include:    1037 Gave Tylenol for pain. 1050 XR knee 4+ vw right injury  No patellar fx, or tib/fib fx.    7487 With no evidence of knee fracture or tendon rupture, pt stable to discharge at this time. MDM   See ED Course. Disposition  Final diagnoses:   Fall, initial encounter   Injury of right knee, initial encounter     Time reflects when diagnosis was documented in both MDM as applicable and the Disposition within this note     Time User Action Codes Description Comment    10/2/2023 10:53 AM Yakelin Sanchez [W09. [id number]] Fall, initial encounter     10/2/2023 10:54 AM Yakelin Sanchez [S89.91XA] Injury of right knee, initial encounter       ED Disposition     ED Disposition   Discharge    Condition   Stable    Date/Time   Mon Oct 2, 2023 10:53 AM    Comment   Gonzalez Clifford discharge to home/self care.                Follow-up Information     Follow up With Specialties Details Why Contact Info Additional Information    [address] Emergency Department Emergency Medicine  As needed [address]  Emergency Department, [place], [place], [address]          Discharge Medication List as of 10/2/2023 10:54 AM      CONTINUE these medications which have NOT CHANGED    Details   amLODIPine (NORVASC) 5 mg tablet TAKE 1 TABLET (5 MG TOTAL) BY MOUTH DAILY. , Starting Tue 1/24/2023, Normal      benzonatate (TESSALON PERLES) 100 mg capsule Take 1 capsule (100 mg total) by mouth 3 (three) times a day as needed for cough, Starting Tue 8/22/2023, Normal      ferrous sulfate 325 (65 Fe) mg tablet Take 1 tablet (325 mg total) by mouth 2 (two) times a day with meals, Starting Sat 5/23/9446, Normal      folic acid (FOLVITE) 485 mcg tablet Take 1 tablet (400 mcg total) by mouth daily, Starting Fri 6/2/2023, Normal      levothyroxine 25 mcg tablet TAKE 1 TABLET BY MOUTH EVERY DAY, Normal      LORazepam (ATIVAN) 2 mg tablet Take 1 tablet (2 mg total) by mouth every 6 (six) hours as needed for anxiety, Starting Tue 9/12/2023, Normal      memantine (NAMENDA) 5 mg tablet 5 mg PO Daily for 1 week, then 5 mg BID for 1 week, then 5 mg QAM and 10 mg QPM for 1 week; then 10 mg BID, Normal      pantoprazole (PROTONIX) 40 mg tablet Take 1 tablet (40 mg total) by mouth 2 (two) times a day, Starting Tue 1/24/2023, Normal      PARoxetine (PAXIL) 30 mg tablet Take 2 tablets (60 mg total) by mouth in the morning, Starting Mon 5/22/2023, Normal      predniSONE 20 mg tablet Take by mouth daily, Historical Med      QUEtiapine (SEROquel XR) 400 mg 24 hr tablet Take 1 tablet (400 mg total) by mouth daily at bedtime, Starting Fri 5/26/2023, Normal      sucralfate (CARAFATE) 1 g tablet TAKE 1 TABLET (1 G TOTAL) BY MOUTH 3 (THREE) TIMES A DAY WITH MEALS, Starting Sat 3/25/2023, Normal      triamcinolone (KENALOG) 0.1 % cream APPLY TOPICALLY 2 TIMES A DAY AS NEEDED FOR RASH., Normal      vitamin B-12 (VITAMIN B-12) 1,000 mcg tablet Take 1 tablet (1,000 mcg total) by mouth daily, Starting Fri 6/2/2023, Normal      ziprasidone (GEODON) 80 mg capsule TAKE 1 CAPSULE BY MOUTH EVERY DAY, Normal           No discharge procedures on file.     PDMP Review       Value Time User    PDMP Reviewed  Yes 9/12/2023 1:29 PM Miles Ge MD           ED Provider  Attending physically available and evaluated Venice Sandoval. I managed the patient along with the ED Attending.     Electronically Signed by         Norberto Austin MD  10/02/23 3640

## 2023-10-02 NOTE — ED NOTES
Roni arranged for patient transport back to [facility].      Wayne Toro, FRANCIA  10/02/23 7347

## 2023-10-02 NOTE — ED ATTENDING ATTESTATION
10/2/2023  I, Willard Kayser, DO, saw and evaluated the patient. I have discussed the patient with the resident/non-physician practitioner and agree with the resident's/non-physician practitioner's findings, Plan of Care, and MDM as documented in the resident's/non-physician practitioner's note, except where noted. All available labs and Radiology studies were reviewed. I was present for key portions of any procedure(s) performed by the resident/non-physician practitioner and I was immediately available to provide assistance. At this point I agree with the current assessment done in the Emergency Department. I have conducted an independent evaluation of this patient a history and physical is as follows:    60 yo female coming in today for evaluation of a fall, landing on her right knee. She has been chronically dizzy, being worked up outpatient, and had another dizzy episode and when she fell landing on her right knee. She came by EMS, she was visiting family at a nursing home and the staff there called an ambulance for her. At this time, she only complains of mild pain in her right knee, denies hitting her head or losing consciousness, denies any other pain anywhere else. PE:  The patient is well appearing, non-toxic, in NAD. Head: normocephalic, atraumatic. HEENT: mucous membranes moist.  Neuro: CN2-12 intact, GCS 15. Normal strength and sensation, normal speech and gait. Cap refill < 2 sec, skin warm and dry. No rashes or lesions. MSK:  Mild right knee tenderness, swelling overlying the patella anteriorly. Normal ROM and normal gait. Xray: no acute traumatic injuries. Stable for d/c home - dx right knee contusion.     ED Course         Critical Care Time  Procedures

## 2023-10-03 ENCOUNTER — HOSPITAL ENCOUNTER (OUTPATIENT)
Dept: RADIOLOGY | Facility: CLINIC | Age: 63
Discharge: HOME | End: 2023-10-03
Payer: COMMERCIAL

## 2023-10-03 ENCOUNTER — TELEPHONE (OUTPATIENT)
Dept: PAIN MEDICINE | Facility: CLINIC | Age: 63
End: 2023-10-03

## 2023-10-03 VITALS
HEART RATE: 87 BPM | TEMPERATURE: 97.8 F | RESPIRATION RATE: 20 BRPM | SYSTOLIC BLOOD PRESSURE: 173 MMHG | DIASTOLIC BLOOD PRESSURE: 98 MMHG | OXYGEN SATURATION: 95 %

## 2023-10-03 DIAGNOSIS — M54.16 LUMBAR RADICULOPATHY: Primary | ICD-10-CM

## 2023-10-03 DIAGNOSIS — D51.8 VITAMIN B12 DEFICIENCY (DIETARY) ANEMIA: ICD-10-CM

## 2023-10-03 DIAGNOSIS — M47.816 LUMBAR SPONDYLOSIS: ICD-10-CM

## 2023-10-03 RX ORDER — BUPIVACAINE HYDROCHLORIDE 5 MG/ML
30 INJECTION, SOLUTION EPIDURAL; INTRACAUDAL ONCE
Status: COMPLETED | OUTPATIENT
Start: 2023-10-03 | End: 2023-10-03

## 2023-10-03 RX ORDER — CELECOXIB 200 MG/1
200 CAPSULE ORAL DAILY
Qty: 30 CAPSULE | Refills: 5 | Status: SHIPPED | OUTPATIENT
Start: 2023-10-03

## 2023-10-03 RX ORDER — LIDOCAINE HYDROCHLORIDE 10 MG/ML
10 INJECTION, SOLUTION EPIDURAL; INFILTRATION; INTRACAUDAL; PERINEURAL ONCE
Status: COMPLETED | OUTPATIENT
Start: 2023-10-03 | End: 2023-10-03

## 2023-10-03 RX ORDER — CELECOXIB 200 MG/1
200 CAPSULE ORAL DAILY
COMMUNITY
End: 2023-10-03 | Stop reason: SDUPTHER

## 2023-10-03 RX ORDER — OMEGA-3/DHA/EPA/FISH OIL 35-113.5MG
1000 TABLET,CHEWABLE ORAL DAILY
Qty: 90 TABLET | Refills: 1 | Status: SHIPPED | OUTPATIENT
Start: 2023-10-03

## 2023-10-03 RX ADMIN — BUPIVACAINE HYDROCHLORIDE 3 ML: 5 INJECTION, SOLUTION EPIDURAL; INTRACAUDAL; PERINEURAL at 10:11

## 2023-10-03 RX ADMIN — LIDOCAINE HYDROCHLORIDE 3 ML: 20 INJECTION, SOLUTION EPIDURAL; INFILTRATION; INTRACAUDAL; PERINEURAL at 10:11

## 2023-10-03 RX ADMIN — LIDOCAINE HYDROCHLORIDE 8 ML: 10 INJECTION, SOLUTION EPIDURAL; INFILTRATION; INTRACAUDAL; PERINEURAL at 10:10

## 2023-10-03 NOTE — DISCHARGE INSTR - LAB

## 2023-10-03 NOTE — TELEPHONE ENCOUNTER
Medication Refill Request     Name celecoxib (CeleBREX) 200 mg capsule   Dose/Frequency Take 200 mg by mouth daily  Quantity 30  Verified pharmacy   [x]  Verified ordering Provider   [x]  Does patient have enough for the next 3 days?  Yes [] No [x]

## 2023-10-03 NOTE — H&P
History of Present Illness: The patient is a 61 y.o. female who presents with complaints of low back pain. Past Medical History:   Diagnosis Date   • Anemia    • Anxiety    • Arthritis    • Back pain at L4-L5 level    • Schreiber esophagus    • Bulimia    • Colon polyp    • Disease of thyroid gland     hypothyroid   • GERD (gastroesophageal reflux disease)    • Hearing loss in left ear    • History of transfusion    • Hyperlipidemia    • Hypertension    • Hypothyroidism    • Leukopenia    • NMS (neuroleptic malignant syndrome) 01/03/2020   • Pneumonia    • RA (rheumatoid arthritis) ([address])     in feet   • Sciatica    • Seizure ([address])     due to medication mix up 8/2018 only one historically   • Skin spots, red     lower right arm - poss from cat- no s/s infection   • Synovial cyst of lumbar spine    • Vertigo    • Wears dentures     full set   • Weight gain        Past Surgical History:   Procedure Laterality Date   • BONE MARROW BIOPSY     • BREAST SURGERY      enlargement with implants   • CLOSED REDUCTION DISTAL FEMUR FRACTURE     • COLONOSCOPY     • COSMETIC SURGERY     • DENTAL SURGERY     • HIP ARTHROPLASTY Right 01/02/2020    Procedure: REVISION TOTAL HIP ARTHROPLASTY WITH REPAIR OF PARIPROSTHETIC FRACTURE - POSTERIOR APPROACH;  Surgeon: Meaghan De Jesus MD;  Location: [facility];  Service: Orthopedics   • OR AMPUTATION METATARSAL W/TOE SINGLE Left 04/07/2023    Procedure: AMPUTATION TOE 4th;  Surgeon: Michael King DPM;  Location: [address] [place] [address] MAIN OR;  Service: Podiatry   • OR ARTHRP ACETBLR/PROX FEM PROSTC AGRFT/ALGRFT Right 12/11/2019    Procedure: ARTHROPLASTY HIP TOTAL ANTERIOR;  Surgeon: Meaghan De Jesus MD;  Location: [facility];  Service: Orthopedics   • OR ESOPHAGOGASTRODUODENOSCOPY TRANSORAL DIAGNOSTIC N/A 05/11/2021    Procedure: ESOPHAGOGASTRODUODENOSCOPY (EGD); Surgeon:  Agustina Hinojosa MD;  Location: [facility];  Service: General   • OR LAPS RPR PARAESPHGL HRNA INCL [address] N/A 05/11/2021 Procedure: REPAIR HERNIA PARAESOPHAGEAL  LAPAROSCOPIC;  Surgeon: Pantera Cantor MD;  Location: [facility];  Service: General   • FL UNLISTED PROCEDURE ESOPHAGUS N/A 05/11/2021    Procedure: FUNDOPLICATION TRANSORAL INCISIONLESS;  Surgeon: Yolanda Hernandez MD;  Location: [facility];  Service: Gastroenterology   • RHINOPLASTY     • SINUS SURGERY     • TOE AMPUTATION Left     2023 4th toe   • TRANSORAL INCISIONLESS FUNDOPLICATION (TIF)  62/39/7123         Current Outpatient Medications:   •  amLODIPine (NORVASC) 5 mg tablet, TAKE 1 TABLET (5 MG TOTAL) BY MOUTH DAILY. , Disp: 90 tablet, Rfl: 3  •  benzonatate (TESSALON PERLES) 100 mg capsule, Take 1 capsule (100 mg total) by mouth 3 (three) times a day as needed for cough, Disp: 30 capsule, Rfl: 5  •  ferrous sulfate 325 (65 Fe) mg tablet, Take 1 tablet (325 mg total) by mouth 2 (two) times a day with meals, Disp: 60 tablet, Rfl: 0  •  folic acid (FOLVITE) 440 mcg tablet, Take 1 tablet (400 mcg total) by mouth daily, Disp: 30 tablet, Rfl: 0  •  levothyroxine 25 mcg tablet, TAKE 1 TABLET BY MOUTH EVERY DAY, Disp: 90 tablet, Rfl: 3  •  LORazepam (ATIVAN) 2 mg tablet, Take 1 tablet (2 mg total) by mouth every 6 (six) hours as needed for anxiety, Disp: 100 tablet, Rfl: 1  •  memantine (NAMENDA) 5 mg tablet, 5 mg PO Daily for 1 week, then 5 mg BID for 1 week, then 5 mg QAM and 10 mg QPM for 1 week; then 10 mg BID, Disp: 120 tablet, Rfl: 6  •  pantoprazole (PROTONIX) 40 mg tablet, Take 1 tablet (40 mg total) by mouth 2 (two) times a day, Disp: 180 tablet, Rfl: 3  •  PARoxetine (PAXIL) 30 mg tablet, Take 2 tablets (60 mg total) by mouth in the morning, Disp: 180 tablet, Rfl: 3  •  predniSONE 20 mg tablet, Take by mouth daily, Disp: , Rfl:   •  QUEtiapine (SEROquel XR) 400 mg 24 hr tablet, Take 1 tablet (400 mg total) by mouth daily at bedtime, Disp: 90 tablet, Rfl: 3  •  sucralfate (CARAFATE) 1 g tablet, TAKE 1 TABLET (1 G TOTAL) BY MOUTH 3 (THREE) TIMES A DAY WITH MEALS, Disp: 270 tablet, Rfl: 3  •  triamcinolone (KENALOG) 0.1 % cream, APPLY TOPICALLY 2 TIMES A DAY AS NEEDED FOR RASH., Disp: 160 g, Rfl: 5  •  vitamin B-12 (VITAMIN B-12) 1,000 mcg tablet, Take 1 tablet (1,000 mcg total) by mouth daily, Disp: 90 tablet, Rfl: 1  •  ziprasidone (GEODON) 80 mg capsule, TAKE 1 CAPSULE BY MOUTH EVERY DAY, Disp: 90 capsule, Rfl: 3  No current facility-administered medications for this encounter. Allergies   Allergen Reactions   • Risperdal [Risperidone] Shortness Of Breath     Rapid heart beat, SOB   • Zyprexa [Olanzapine] Shortness Of Breath     Rapid heartbeat   • Latex Rash     Band aids, adhesives wears normal underwear, can eat bananas  USE PAPER TAPE       Physical Exam:   Vitals:    10/03/23 0937   BP: 149/94   Pulse: 89   Resp: 18   Temp: 97.8 °F (36.6 °C)   SpO2: 96%     General: Awake, Alert, Oriented x 3, Mood and affect appropriate  Respiratory: Respirations even and unlabored  Cardiovascular: Peripheral pulses intact; no edema  Musculoskeletal Exam: Right lumbar paraspinals tender to palpation    ASA Score: 3    Patient/Chart Verification  Patient ID Verified: Verbal  ID Band Applied: No  Consents Confirmed: Procedural, To be obtained in the Pre-Procedure area  H&P( within 30 days) Verified: To be obtained in the Pre-Procedure area  Interval H&P(within 24 hr) Complete (required for Outpatients and Surgery Admit only): To be obtained in the Pre-Procedure area  Allergies Reviewed: Yes  Anticoag/NSAID held?: NA  Currently on antibiotics?: No  Pregnancy denied?: NA    Assessment:   1.  Lumbar spondylosis        Plan: RIGHT L3-5 RFA

## 2023-10-04 ENCOUNTER — OFFICE VISIT (OUTPATIENT)
Dept: PHYSICAL THERAPY | Age: 63
End: 2023-10-04
Payer: COMMERCIAL

## 2023-10-04 DIAGNOSIS — F07.81 POST CONCUSSION SYNDROME: ICD-10-CM

## 2023-10-04 DIAGNOSIS — R26.9 ABNORMALITY OF GAIT: Primary | ICD-10-CM

## 2023-10-04 DIAGNOSIS — R42 DIZZINESS: ICD-10-CM

## 2023-10-04 PROCEDURE — 97140 MANUAL THERAPY 1/> REGIONS: CPT

## 2023-10-04 PROCEDURE — 97530 THERAPEUTIC ACTIVITIES: CPT

## 2023-10-04 PROCEDURE — 97112 NEUROMUSCULAR REEDUCATION: CPT

## 2023-10-05 DIAGNOSIS — R41.3 MEMORY PROBLEM: ICD-10-CM

## 2023-10-05 DIAGNOSIS — G44.309 POST-TRAUMATIC HEADACHE: ICD-10-CM

## 2023-10-05 RX ORDER — MEMANTINE HYDROCHLORIDE 10 MG/1
10 TABLET ORAL 2 TIMES DAILY
Qty: 60 TABLET | Refills: 6 | Status: SHIPPED | OUTPATIENT
Start: 2023-10-05

## 2023-10-05 RX ORDER — MEMANTINE HYDROCHLORIDE 5 MG/1
TABLET ORAL
Qty: 360 TABLET | Refills: 3 | OUTPATIENT
Start: 2023-10-05

## 2023-10-05 RX ORDER — MEMANTINE HYDROCHLORIDE 5 MG/1
5 TABLET ORAL 2 TIMES DAILY
Qty: 28 TABLET | Refills: 0 | Status: SHIPPED | OUTPATIENT
Start: 2023-10-05 | End: 2023-10-19

## 2023-10-05 NOTE — PROGRESS NOTES
Daily Note     Today's date: 10/4/2023  Patient name: Yolanda Pedraza  : 1960  MRN: [id number]  Referring provider: Jensen Mcdaniels MD  Dx: No diagnosis found. Subjective: The pt returns to PT to resume treatment for vertigo, gait abnormality and imbalance now with dx of post concussion syndrome. Objective: See treatment diary below      Assessment: Tolerated treatment well. Patient demonstrated fatigue post treatment      Plan: Continue per plan of care.       Precautions: allergies: latex, zyprexa, risperdal      Manuals 8/30/23 9/7/23 10/4/23           I eval             Left epley Man 15min Left man 15 min Left man 15min          Retest left tammy hallpike  perf + on  Left 5 seconds  perf          biodex balance testing and training  perf            Neuro Re-Ed             Tandem gait  1 min x 1 1 min x 1          Tandem stance  30 sec x 3 30 sec x 3          squats  10 2 x 10          Ankle sway referencing flat and foam eo, ec  10 reps flat surface only eo, ec close super 10 reps each          Ball pass activities             Eye head exer  Increase to 10 reps   See hand out 5 reps each  10 reps           vor 1 and 2 exer             Ther Ex                                                                                                                     Ther Activity                                       Gait Training                                       Modalities

## 2023-10-10 ENCOUNTER — APPOINTMENT (OUTPATIENT)
Dept: PHYSICAL THERAPY | Age: 63
End: 2023-10-10
Payer: COMMERCIAL

## 2023-10-12 ENCOUNTER — OFFICE VISIT (OUTPATIENT)
Dept: PHYSICAL THERAPY | Age: 63
End: 2023-10-12
Payer: COMMERCIAL

## 2023-10-12 DIAGNOSIS — R42 DIZZINESS: ICD-10-CM

## 2023-10-12 DIAGNOSIS — R26.9 ABNORMALITY OF GAIT: Primary | ICD-10-CM

## 2023-10-12 DIAGNOSIS — F07.81 POST CONCUSSION SYNDROME: ICD-10-CM

## 2023-10-12 PROCEDURE — 97530 THERAPEUTIC ACTIVITIES: CPT

## 2023-10-12 PROCEDURE — 97140 MANUAL THERAPY 1/> REGIONS: CPT

## 2023-10-12 PROCEDURE — 97112 NEUROMUSCULAR REEDUCATION: CPT

## 2023-10-12 NOTE — PROGRESS NOTES
Daily Note     Today's date: 10/12/2023  Patient name: Vignesh Weber  : 1960  MRN: [id number]  Referring provider: Mamie Cherry MD  Dx:   Encounter Diagnosis     ICD-10-CM    1. Abnormality of gait  R26.9       2. Dizziness  R42       3. Post concussion syndrome  F07.81                      Subjective: I'm dizzy and nauseous right now and I'm worried about the medications I've been susbscribed. ( Pt to call dr to discuss meds  prednisone was completed) concern over memantine with current psych medications      Objective: See treatment diary below      Assessment: Tolerated treatment well. Patient feeling better after having PT today. Not nauseous post session today. Plan: Continue per plan of care.        Precautions: allergies: latex, zyprexa, risperdal      Manuals 8/30/23 9/7/23 10/4/23 10/12          I eval             Left epley Man 15min Left man 15 min Left man 15min Left man 15 min         Retest left tammy hallpike  perf + on  Left 5 seconds  perf Man left + small oscillations          biodex balance testing and training  perf            Neuro Re-Ed             Tandem gait  1 min x 1 1 min x 1 1 min x 1         Tandem stance  30 sec x 3 30 sec x 3 30 sec x 3         squats  10 2 x 10 3 x 10         Ankle sway referencing flat and foam eo, ec  10 reps flat surface only eo, ec close super 10 reps each 10 reps each         Ball pass activities             Eye head exer  Increase to 10 reps   See hand out 5 reps each  10 reps  10 reps each         vor 1 and 2 exer             Ther Ex             Foam standing eo, ec with vertical and horizontal head turns    10 reps                                                                                                     Ther Activity                                       Gait Training                                       Modalities

## 2023-10-17 ENCOUNTER — OFFICE VISIT (OUTPATIENT)
Dept: PHYSICAL THERAPY | Age: 63
End: 2023-10-17
Payer: COMMERCIAL

## 2023-10-17 ENCOUNTER — TELEPHONE (OUTPATIENT)
Dept: NEUROLOGY | Facility: CLINIC | Age: 63
End: 2023-10-17

## 2023-10-17 DIAGNOSIS — R42 DIZZINESS: ICD-10-CM

## 2023-10-17 DIAGNOSIS — R26.9 ABNORMALITY OF GAIT: Primary | ICD-10-CM

## 2023-10-17 DIAGNOSIS — F07.81 POST CONCUSSION SYNDROME: ICD-10-CM

## 2023-10-17 PROCEDURE — 97140 MANUAL THERAPY 1/> REGIONS: CPT

## 2023-10-17 PROCEDURE — 97112 NEUROMUSCULAR REEDUCATION: CPT

## 2023-10-17 PROCEDURE — 97530 THERAPEUTIC ACTIVITIES: CPT

## 2023-10-17 NOTE — TELEPHONE ENCOUNTER
Patient left voicemail regarding memantine - feels she is getting funny side effects from it. Would like call back to discuss.    Call returned to patient, [phone]. Patient reporting confusion associated with the start of medication. Through dentures away, left purse at [facility]. Feels it is working in the opposite direction. Has since stopped medication. She is not interested in an alternate medication. Just wanted to provide you with an update.

## 2023-10-17 NOTE — PROGRESS NOTES
Daily Note     Today's date: 10/17/2023  Patient name: Elysia Roman  : 1960  MRN: [id number]  Referring provider: Wilberto Love MD  Dx:   Encounter Diagnosis     ICD-10-CM    1. Abnormality of gait  R26.9       2. Dizziness  R42       3. Post concussion syndrome  F07.81                      Subjective: "I'm doing the best that I can." Pt c/o nausea and headache upon entering PT dept. Objective: See treatment diary below      Assessment: Tolerated treatment well. Patient demonstrated fatigue post treatment. Upon leaving PT dept no nausea very slight headache present. Discussion that pt is making progress towards set goals. Plan: Continue per plan of care.       Precautions: allergies: latex, zyprexa, risperdal      Manuals 8/30/23 9/7/23 10/4/23 10/12 10/17         I eval             Left epley Man 15min Left man 15 min Left man 15min Left man 15 min Left man 15min 3 beat nystagmus small oscillations        Retest left tammy hallpike  perf + on  Left 5 seconds  perf Man left + small oscillations  man        biodex balance testing and training  perf            Neuro Re-Ed             Tandem gait  1 min x 1 1 min x 1 1 min x 1 1 min x 1        Tandem stance  30 sec x 3 30 sec x 3 30 sec x 3 30 sc x 3        squats  10 2 x 10 3 x 10 3 x 10        Ankle sway referencing flat and foam eo, ec  10 reps flat surface only eo, ec close super 10 reps each 10 reps each 10 reps         Ball pass activities flex/ext , rot , ccw and cw circles ,      10 reps         Eye head exer  Increase to 10 reps   See hand out 5 reps each  10 reps  10 reps each         vor 1 and 2 exer             Ther Ex             Foam standing eo, ec with vertical and horizontal head turns    10 reps  10 reps         Walk and toss ball to self      40 ft x 2 withcueing        Hallway sidestepping ball toss on wall in front  close superv     20 ft x 2        Hallway r/l ball toss close superv Ther Activity                                       Gait Training                                       Modalities

## 2023-10-18 DIAGNOSIS — G44.309 POST-TRAUMATIC HEADACHE: ICD-10-CM

## 2023-10-18 DIAGNOSIS — R41.3 MEMORY PROBLEM: ICD-10-CM

## 2023-10-19 ENCOUNTER — OFFICE VISIT (OUTPATIENT)
Dept: PHYSICAL THERAPY | Age: 63
End: 2023-10-19
Payer: COMMERCIAL

## 2023-10-19 DIAGNOSIS — R42 DIZZINESS: ICD-10-CM

## 2023-10-19 DIAGNOSIS — R26.9 ABNORMALITY OF GAIT: Primary | ICD-10-CM

## 2023-10-19 PROCEDURE — 97112 NEUROMUSCULAR REEDUCATION: CPT

## 2023-10-19 PROCEDURE — 97530 THERAPEUTIC ACTIVITIES: CPT

## 2023-10-19 RX ORDER — MEMANTINE HYDROCHLORIDE 10 MG/1
10 TABLET ORAL 2 TIMES DAILY
Qty: 180 TABLET | Refills: 3 | Status: SHIPPED | OUTPATIENT
Start: 2023-10-19

## 2023-10-19 NOTE — PROGRESS NOTES
Daily Note     Today's date: 10/19/2023  Patient name: Guanakito Bullock  : 1960  MRN: [id number]  Referring provider: Yimi Horowitz MD  Dx: No diagnosis found. Subjective: Patient noted feeling a little dizzy and having a headache L side forehead. Patient noted she took celebrax before coming to treatment today. Patient noted that she only takes celebrax as needed. Objective: See treatment diary below      Assessment: Tolerated treatment fair. Patient demonstrated fatigue post treatment and would benefit from continued PT. Patient continues to be challenged with tandem ambulation and standing tandem balance, ankle stragey used to help re balance and to decrease lateral sway. Plan: Continue per plan of care.       Precautions: allergies: latex, zyprexa, risperdal      Manuals 8/30/23 9/7/23 10/4/23 10/12 10/17 10/19        I eval             Left epley Man 15min Left man 15 min Left man 15min Left man 15 min Left man 15min 3 beat nystagmus small oscillations NV       Retest left tammy hallpike  perf + on  Left 5 seconds  perf Man left + small oscillations  man NV       biodex balance testing and training  perf            Neuro Re-Ed             Tandem gait  1 min x 1 1 min x 1 1 min x 1 1 min x 1 1 min x 1        Tandem stance  30 sec x 3 30 sec x 3 30 sec x 3 30 sc x 3 30 sc x 3       squats  10 2 x 10 3 x 10 3 x 10 3 x 10       Ankle sway referencing flat and foam eo, ec  10 reps flat surface only eo, ec close super 10 reps each 10 reps each 10 reps  10 reps        Ball pass activities flex/ext , rot , ccw and cw circles ,      10 reps  10 reps        Eye head exer  Increase to 10 reps   See hand out 5 reps each  10 reps  10 reps each  10 reps each       vor 1 and 2 exer             Ther Ex             Foam standing eo, ec with vertical and horizontal head turns    10 reps  10 reps  10 x reps        Walk and toss ball to self      40 ft x 2 withcueing 0 ft x 2 withcueing Hallway sidestepping ball toss on wall in front  close superv     20 ft x 2 20 ft x 2       Hallway r/l ball toss close superv                                                                 Ther Activity                                       Gait Training                                       Modalities

## 2023-10-21 PROBLEM — R05.1 ACUTE COUGH: Status: RESOLVED | Noted: 2022-09-21 | Resolved: 2023-10-21

## 2023-10-24 ENCOUNTER — OFFICE VISIT (OUTPATIENT)
Dept: PHYSICAL THERAPY | Age: 63
End: 2023-10-24
Payer: COMMERCIAL

## 2023-10-24 DIAGNOSIS — F07.81 POST CONCUSSION SYNDROME: ICD-10-CM

## 2023-10-24 DIAGNOSIS — R42 DIZZINESS: ICD-10-CM

## 2023-10-24 DIAGNOSIS — R26.9 ABNORMALITY OF GAIT: Primary | ICD-10-CM

## 2023-10-24 PROCEDURE — 97530 THERAPEUTIC ACTIVITIES: CPT

## 2023-10-24 PROCEDURE — 97112 NEUROMUSCULAR REEDUCATION: CPT

## 2023-10-24 PROCEDURE — 97110 THERAPEUTIC EXERCISES: CPT

## 2023-10-25 NOTE — PROGRESS NOTES
Daily Note     Today's date: 10/24/2023  Patient name: Gonzalez Clifford  : 1960  MRN: [id number]  Referring provider: Raymundo Juan MD  Dx:   Encounter Diagnosis     ICD-10-CM    1. Abnormality of gait  R26.9       2. Dizziness  R42       3. Post concussion syndrome  F07.81                      Subjective: "I believe the celebrex helps my vertigo."  Pt advised to discuss her medications with her physician as to why each medication was ordered and what the medication does for her. Objective: See treatment diary below      Assessment: Tolerated treatment well. Patient demonstrated fatigue post treatment      Plan: Continue per plan of care.       Precautions: allergies: latex, zyprexa, risperdal      Manuals 8/30/23 9/7/23 10/4/23 10/12 10/17 10/19 10/24       I eval             Left epley Man 15min Left man 15 min Left man 15min Left man 15 min Left man 15min 3 beat nystagmus small oscillations NV       Retest left tammy hallpike  perf + on  Left 5 seconds  perf Man left + small oscillations  man NV       biodex balance testing and training  perf            Neuro Re-Ed             Tandem gait  1 min x 1 1 min x 1 1 min x 1 1 min x 1 1 min x 1  1 min x 1      Tandem stance  30 sec x 3 30 sec x 3 30 sec x 3 30 sc x 3 30 sc x 3 30 sec x 3      squats  10 2 x 10 3 x 10 3 x 10 3 x 10 3 x 10      Ankle sway referencing flat and foam eo, ec  10 reps flat surface only eo, ec close super 10 reps each 10 reps each 10 reps  10 reps  10 reps close [employer] pass activities flex/ext , rot , ccw and cw circles ,      10 reps  10 reps        Eye head exer  Increase to 10 reps   See hand out 5 reps each  10 reps  10 reps each  10 reps each       vor 1 and 2 exer             Ther Ex             Foam standing eo, ec with vertical and horizontal head turns    10 reps  10 reps  10 x reps  10 x       Walk and toss ball to self      40 ft x 2 withcueing 40 ft x 2 withcueing 40 ft x 2      Hallway sidestepping ball toss on wall in front  close superv     20 ft x 2 20 ft x 2       Hallway r/l ball toss close superv                                                                 Ther Activity                                       Gait Training                                       Modalities

## 2023-10-27 ENCOUNTER — OFFICE VISIT (OUTPATIENT)
Dept: PHYSICAL THERAPY | Age: 63
End: 2023-10-27
Payer: COMMERCIAL

## 2023-10-27 DIAGNOSIS — F07.81 POST CONCUSSION SYNDROME: ICD-10-CM

## 2023-10-27 DIAGNOSIS — R26.9 ABNORMALITY OF GAIT: Primary | ICD-10-CM

## 2023-10-27 DIAGNOSIS — R42 DIZZINESS: ICD-10-CM

## 2023-10-27 PROCEDURE — 97110 THERAPEUTIC EXERCISES: CPT

## 2023-10-27 PROCEDURE — 97140 MANUAL THERAPY 1/> REGIONS: CPT

## 2023-10-27 PROCEDURE — 97112 NEUROMUSCULAR REEDUCATION: CPT

## 2023-10-27 NOTE — PROGRESS NOTES
Daily Note     Today's date: 10/27/2023  Patient name: Samantha Hughes  : 1960  MRN: [id number]  Referring provider: Alma Kuo MD  Dx:   Encounter Diagnosis     ICD-10-CM    1. Abnormality of gait  R26.9       2. Dizziness  R42       3. Post concussion syndrome  F07.81                      Subjective: "I feel better after being here always."      Objective: See treatment diary below      Assessment: Tolerated treatment well. Patient would benefit from continued PT      Plan: Continue per plan of care.       Precautions: allergies: latex, zyprexa, risperdal      Manuals 8/30/23 9/7/23 10/4/23 10/12 10/17 10/19 10/24 10/27      I eval             Left epley Man 15min Left man 15 min Left man 15min Left man 15 min Left man 15min 3 beat nystagmus small oscillations NV  Left man 1 min x 1     Retest left tammy hallpike  perf + on  Left 5 seconds  perf Man left + small oscillations  man NV       biodex balance testing and training  perf       Training x 5 maze and los       Neuro Re-Ed             Tandem gait  1 min x 1 1 min x 1 1 min x 1 1 min x 1 1 min x 1  1 min x 1 1 minx 1     Tandem stance  30 sec x 3 30 sec x 3 30 sec x 3 30 sc x 3 30 sc x 3 30 sec x 3 30 se c x3     squats  10 2 x 10 3 x 10 3 x 10 3 x 10 3 x 10 3 x 10     Ankle sway referencing flat and foam eo, ec  10 reps flat surface only eo, ec close super 10 reps each 10 reps each 10 reps  10 reps  10 reps close superv 10     Ball pass activities flex/ext , rot , ccw and cw circles ,      10 reps  10 reps   10 reps      Eye head exer  Increase to 10 reps   See hand out 5 reps each  10 reps  10 reps each  10 reps each       vor 1 and 2 exer             Ther Ex             Foam standing eo, ec with vertical and horizontal head turns    10 reps  10 reps  10 x reps  10 x       Walk and toss ball to self      40 ft x 2 withcueing 40 ft x 2 withcueing 40 ft x 2      Hallway sidestepping ball toss on wall in front  close superv     20 ft x 2 20 ft x 2       Hallway r/l ball toss close superv                                                                 Ther Activity                                       Gait Training                                       Modalities

## 2023-10-31 ENCOUNTER — APPOINTMENT (OUTPATIENT)
Dept: PHYSICAL THERAPY | Age: 63
End: 2023-10-31
Payer: COMMERCIAL

## 2023-11-01 ENCOUNTER — OFFICE VISIT (OUTPATIENT)
Dept: PHYSICAL THERAPY | Age: 63
End: 2023-11-01
Payer: COMMERCIAL

## 2023-11-01 DIAGNOSIS — R26.9 ABNORMALITY OF GAIT: Primary | ICD-10-CM

## 2023-11-01 PROCEDURE — 97110 THERAPEUTIC EXERCISES: CPT

## 2023-11-01 NOTE — PROGRESS NOTES
Daily Note     Today's date: 2023  Patient name: Grady Germain  : 1960  MRN: [id number]  Referring provider: Keyonna Giron MD  Dx:   Encounter Diagnosis     ICD-10-CM    1. Abnormality of gait  R26.9                      Subjective: Dizziness with side stepping in solorio. Objective: See treatment diary below      Assessment: Tolerated treatment well. Patient would benefit from continued PT      Plan: Continue per plan of care.       Precautions: allergies: latex, zyprexa, risperdal      Manuals 8/30/23 9/7/23 10/4/23 10/12 10/17 10/19 10/24 10/27 11/1     I eval             Left epley Man 15min Left man 15 min Left man 15min Left man 15 min Left man 15min 3 beat nystagmus small oscillations NV  Left man 1 min x 1     Retest left tammy hallpike  perf + on  Left 5 seconds  perf Man left + small oscillations  man NV       biodex balance testing and training  perf       Training x 5 maze and los   3x    Neuro Re-Ed             Tandem gait  1 min x 1 1 min x 1 1 min x 1 1 min x 1 1 min x 1  1 min x 1 1 minx 1 1 min    Tandem stance  30 sec x 3 30 sec x 3 30 sec x 3 30 sc x 3 30 sc x 3 30 sec x 3 30 se c x3 20sec x 5    squats  10 2 x 10 3 x 10 3 x 10 3 x 10 3 x 10 3 x 10 3 x 10    Ankle sway referencing flat and foam eo, ec  10 reps flat surface only eo, ec close super 10 reps each 10 reps each 10 reps  10 reps  10 reps close superv 10 10x    Ball pass activities flex/ext , rot , ccw and cw circles ,      10 reps  10 reps   10 reps  10x    Eye head exer  Increase to 10 reps   See hand out 5 reps each  10 reps  10 reps each  10 reps each       vor 1 and 2 exer             Ther Ex             Foam standing eo, ec with vertical and horizontal head turns    10 reps  10 reps  10 x reps  10 x       Walk and toss ball to self      40 ft x 2 withcueing 40 ft x 2 withcueing 40 ft x 2 40ft x 4     Hallway sidestepping ball toss on wall in front  close superv     20 ft x 2 20 ft x 2  100ft x 2     Hallway r/l ball toss close superv                                                                 Ther Activity                                       Gait Training                                       Modalities

## 2023-11-02 ENCOUNTER — TELEPHONE (OUTPATIENT)
Dept: GASTROENTEROLOGY | Facility: CLINIC | Age: 63
End: 2023-11-02

## 2023-11-03 ENCOUNTER — OFFICE VISIT (OUTPATIENT)
Dept: PHYSICAL THERAPY | Age: 63
End: 2023-11-03
Payer: COMMERCIAL

## 2023-11-03 DIAGNOSIS — R42 DIZZINESS: ICD-10-CM

## 2023-11-03 DIAGNOSIS — R26.9 ABNORMALITY OF GAIT: Primary | ICD-10-CM

## 2023-11-03 DIAGNOSIS — F07.81 POST CONCUSSION SYNDROME: ICD-10-CM

## 2023-11-03 PROCEDURE — 97112 NEUROMUSCULAR REEDUCATION: CPT

## 2023-11-03 PROCEDURE — 97750 PHYSICAL PERFORMANCE TEST: CPT

## 2023-11-03 PROCEDURE — 97110 THERAPEUTIC EXERCISES: CPT

## 2023-11-03 PROCEDURE — 97140 MANUAL THERAPY 1/> REGIONS: CPT

## 2023-11-03 NOTE — PROGRESS NOTES
Daily Note     Today's date: 11/3/2023  Patient name: Venice Sandoval  : 1960  MRN: [id number]  Referring provider: Miles Ge MD  Dx:   Encounter Diagnosis     ICD-10-CM    1. Abnormality of gait  R26.9       2. Dizziness  R42       3. Post concussion syndrome  F07.81                      Subjective: "I had an awful day yesterday."      Objective: See treatment diary below      Assessment: Tolerated treatment well. Patient would benefit from continued PT      Plan: Continue per plan of care.       Precautions: allergies: latex, zyprexa, risperdal      Manuals 8/30/23 9/7/23 10/4/23 10/12 10/17 10/19 10/24 10/27 11/1 11/3    I eval             Left epley Man 15min Left man 15 min Left man 15min Left man 15 min Left man 15min 3 beat nystagmus small oscillations NV  Left man 1 min x 1  Left epley x 1 man   Retest left tammy hallpike  perf + on  Left 5 seconds  perf Man left + small oscillations  man NV       biodex balance testing and training  perf       Training x 5 maze and los   3x Perf testing for reassessment today   Neuro Re-Ed             Tandem gait  1 min x 1 1 min x 1 1 min x 1 1 min x 1 1 min x 1  1 min x 1 1 minx 1 1 min 1min x 1   Tandem stance  30 sec x 3 30 sec x 3 30 sec x 3 30 sc x 3 30 sc x 3 30 sec x 3 30 se c x3 20sec x 5 30 sec x 3   squats  10 2 x 10 3 x 10 3 x 10 3 x 10 3 x 10 3 x 10 3 x 10 3 x 10   Ankle sway referencing flat and foam eo, ec  10 reps flat surface only eo, ec close super 10 reps each 10 reps each 10 reps  10 reps  10 reps close superv 10 10x 10x   Ball pass activities flex/ext , rot , ccw and cw circles ,      10 reps  10 reps   10 reps  10x    Eye head exer  Increase to 10 reps   See hand out 5 reps each  10 reps  10 reps each  10 reps each    At home   vor 1 and 2 exer             Ther Ex             Foam standing eo, ec with vertical and horizontal head turns    10 reps  10 reps  10 x reps  10 x    10 x   Walk and toss ball to self      40 ft x 2 withcueing 40 ft x 2 withcueing 40 ft x 2 40ft x 4     Hallway sidestepping ball toss on wall in front  close superv     20 ft x 2 20 ft x 2  100ft x 2     Hallway r/l ball toss close superv                                                                 Ther Activity                                       Gait Training                                       Modalities

## 2023-11-03 NOTE — TELEPHONE ENCOUNTER
Called and spoke to the patient, appointment was cancelled, # to our office was provided to her.  She will be calling back to reschedule

## 2023-11-06 DIAGNOSIS — F41.9 ANXIETY: ICD-10-CM

## 2023-11-06 RX ORDER — LORAZEPAM 2 MG/1
2 TABLET ORAL EVERY 6 HOURS PRN
Qty: 100 TABLET | Refills: 1 | Status: SHIPPED | OUTPATIENT
Start: 2023-11-06

## 2023-11-06 NOTE — TELEPHONE ENCOUNTER
Pt called requesting a refill on Lorazepam.   If this can be sent to [facility] in [facility].

## 2023-11-08 ENCOUNTER — OFFICE VISIT (OUTPATIENT)
Dept: PHYSICAL THERAPY | Age: 63
End: 2023-11-08
Payer: COMMERCIAL

## 2023-11-08 DIAGNOSIS — R26.9 ABNORMALITY OF GAIT: Primary | ICD-10-CM

## 2023-11-08 PROCEDURE — 97110 THERAPEUTIC EXERCISES: CPT

## 2023-11-08 NOTE — PROGRESS NOTES
Daily Note     Today's date: 2023  Patient name: Gianni Rocha  : 1960  MRN: [id number]  Referring provider: Koki Vila MD  Dx:   Encounter Diagnosis     ICD-10-CM    1. Abnormality of gait  R26.9                      Subjective: Pt. Reports she has nausea and dizziness every morning for a couple hours. Objective: See treatment diary below      Assessment: Tolerated treatment well. Patient would benefit from continued PT      Plan: Continue per plan of care.       Precautions: allergies: latex, zyprexa, risperdal      Manuals 11/8 9/7/23 10/4/23 10/12 10/17 10/19 10/24 10/27 11/1 11/3                Left epley  Left man 15 min Left man 15min Left man 15 min Left man 15min 3 beat nystagmus small oscillations NV  Left man 1 min x 1  Left epley x 1 man   Retest left tammy hallpike  perf + on  Left 5 seconds  perf Man left + small oscillations  man NV       biodex balance testing and training  perf       Training x 5 maze and los   3x Perf testing for reassessment today   Neuro Re-Ed             Tandem gait 1 min 1 min x 1 1 min x 1 1 min x 1 1 min x 1 1 min x 1  1 min x 1 1 minx 1 1 min 1min x 1   Tandem stance 20sec x 5 30 sec x 3 30 sec x 3 30 sec x 3 30 sc x 3 30 sc x 3 30 sec x 3 30 se c x3 20sec x 5 30 sec x 3   squats 30x 10 2 x 10 3 x 10 3 x 10 3 x 10 3 x 10 3 x 10 3 x 10 3 x 10   Ankle sway referencing flat and foam eo, ec 10x 10 reps flat surface only eo, ec close super 10 reps each 10 reps each 10 reps  10 reps  10 reps close superv 10 10x 10x   Ball pass activities flex/ext , rot , ccw and cw circles ,      10 reps  10 reps   10 reps  10x    Eye head exer  Increase to 10 reps  hep See hand out 5 reps each  10 reps  10 reps each  10 reps each    At home   vor 1 and 2 exer             Ther Ex             Foam standing eo, ec with vertical and horizontal head turns    10 reps  10 reps  10 x reps  10 x    10 x   Walk and toss ball to self  40ft x 4    40 ft x 2 withcueing 40 ft x 2 withcueing 40 ft x 2 40ft x 4     Hallway sidestepping ball toss on wall in front  close superv 100ft x 2    20 ft x 2 20 ft x 2  100ft x 2     Hallway r/l ball toss close superv 100ft x 2                                                                Ther Activity                                       Gait Training                                       Modalities

## 2023-11-15 ENCOUNTER — OFFICE VISIT (OUTPATIENT)
Dept: PHYSICAL THERAPY | Age: 63
End: 2023-11-15
Payer: COMMERCIAL

## 2023-11-15 DIAGNOSIS — R26.9 ABNORMALITY OF GAIT: Primary | ICD-10-CM

## 2023-11-15 PROCEDURE — 97110 THERAPEUTIC EXERCISES: CPT

## 2023-11-15 PROCEDURE — 97140 MANUAL THERAPY 1/> REGIONS: CPT

## 2023-11-15 NOTE — PROGRESS NOTES
Daily Note     Today's date: 11/15/2023  Patient name: Gianni Rocha  : 1960  MRN: [id number]  Referring provider: Koki Vila MD  Dx:   Encounter Diagnosis     ICD-10-CM    1. Abnormality of gait  R26.9                      Subjective: Pt. Reports she wakes with nausea in the morning. Also reports she has increased forgetfulness. Decreasing headache symptoms. Objective: See treatment diary below      Assessment: Tolerated treatment well. Patient would benefit from continued PT      Plan: Continue per plan of care.       Precautions: allergies: latex, zyprexa, risperdal      Manuals 11/8 11/15 10/4/23 10/12 10/17 10/19 10/24 10/27 11/1 11/3                Left epley  Left epley Left man 15min Left man 15 min Left man 15min 3 beat nystagmus small oscillations NV  Left man 1 min x 1  Left epley x 1 man   Retest left tammy hallpike   perf Man left + small oscillations  man NV       [employer] balance testing and training        Training x 5 maze and los   3x Perf testing for reassessment today   Neuro Re-Ed             Tandem gait 1 min 1 min x 1 1 min x 1 1 min x 1 1 min x 1 1 min x 1  1 min x 1 1 minx 1 1 min 1min x 1   Tandem stance 20sec x 5 30 sec x 3 30 sec x 3 30 sec x 3 30 sc x 3 30 sc x 3 30 sec x 3 30 se c x3 20sec x 5 30 sec x 3   squats 30x 10 2 x 10 3 x 10 3 x 10 3 x 10 3 x 10 3 x 10 3 x 10 3 x 10   Ankle sway referencing flat and foam eo, ec 10x  10 reps each 10 reps each 10 reps  10 reps  10 reps close superv 10 10x 10x   Ball pass activities flex/ext , rot , ccw and cw circles ,      10 reps  10 reps   10 reps  10x    Eye head exer  Increase to 10 reps  hep hep 10 reps  10 reps each  10 reps each    At home   vor 1 and 2 exer             Ther Ex             Foam standing eo, ec with vertical and horizontal head turns    10 reps  10 reps  10 x reps  10 x    10 x   Walk and toss ball to self  40ft x 4 40ft x 4   40 ft x 2 withcueing 40 ft x 2 withcueing 40 ft x 2 40ft x 4     Hallway sidestepping ball toss on wall in front  close superv 100ft x 2 100ft x 2   20 ft x 2 20 ft x 2  100ft x 2     Hallway r/l ball toss close superv 100ft x 2 100ft x 2                                                               Ther Activity                                       Gait Training                                       Modalities

## 2023-11-16 ENCOUNTER — OFFICE VISIT (OUTPATIENT)
Dept: PODIATRY | Facility: CLINIC | Age: 63
End: 2023-11-16
Payer: COMMERCIAL

## 2023-11-16 VITALS
DIASTOLIC BLOOD PRESSURE: 77 MMHG | SYSTOLIC BLOOD PRESSURE: 117 MMHG | HEIGHT: 62 IN | RESPIRATION RATE: 18 BRPM | HEART RATE: 93 BPM | BODY MASS INDEX: 33.49 KG/M2 | WEIGHT: 182 LBS

## 2023-11-16 DIAGNOSIS — K22.10 EROSIVE ESOPHAGITIS: ICD-10-CM

## 2023-11-16 DIAGNOSIS — M20.41 HAMMER TOES OF BOTH FEET: Primary | ICD-10-CM

## 2023-11-16 DIAGNOSIS — B35.1 ONYCHOMYCOSIS: ICD-10-CM

## 2023-11-16 DIAGNOSIS — M20.42 HAMMER TOES OF BOTH FEET: Primary | ICD-10-CM

## 2023-11-16 PROCEDURE — 99212 OFFICE O/P EST SF 10 MIN: CPT | Performed by: PODIATRIST

## 2023-11-16 RX ORDER — PANTOPRAZOLE SODIUM 40 MG/1
40 TABLET, DELAYED RELEASE ORAL 2 TIMES DAILY
Qty: 180 TABLET | Refills: 1 | Status: SHIPPED | OUTPATIENT
Start: 2023-11-16

## 2023-11-16 NOTE — PROGRESS NOTES
Patient presents with multiple thick dystrophic toenails that she is unable to cut. These nails appear mycotic. A total of 8 nails are present. Treatment consisted of nail debridement. Patient also has an inflamed right hallux. This is secondary to a hallux hammertoe. Discussed treatment options. Patient is not interested in surgery. Recommended shoes that are soft with a high toe box. Patient is scheduled in 3 months for palliative care.

## 2023-11-22 ENCOUNTER — OFFICE VISIT (OUTPATIENT)
Dept: PHYSICAL THERAPY | Age: 63
End: 2023-11-22
Payer: COMMERCIAL

## 2023-11-22 DIAGNOSIS — R26.9 ABNORMALITY OF GAIT: Primary | ICD-10-CM

## 2023-11-22 PROCEDURE — 97110 THERAPEUTIC EXERCISES: CPT

## 2023-11-22 NOTE — PROGRESS NOTES
Daily Note     Today's date: 2023  Patient name: Hoa Renteria  : 1960  MRN: [id number]  Referring provider: Miguel Renteria MD  Dx:   Encounter Diagnosis     ICD-10-CM    1. Abnormality of gait  R26.9                      Subjective: Pt. Reports having a stressful day yesterday with increased dizziness. Objective: See treatment diary below      Assessment: Tolerated treatment well. Patient would benefit from continued PT      Plan: Continue per plan of care.       Precautions: allergies: latex, zyprexa, risperdal      Manuals 11/8 11/15 11/22 10/12 10/17 10/19 10/24 10/27 11/1 11/3                Left epley  Left epley  Left man 15 min Left man 15min 3 beat nystagmus small oscillations NV  Left man 1 min x 1  Left epley x 1 man   Retest left tammy hallpike    Man left + small oscillations  man NV       biodex balance testing and training        Training x 5 maze and los   3x Perf testing for reassessment today   Neuro Re-Ed             Tandem gait 1 min 1 min x 1 1 min x 1 1 min x 1 1 min x 1 1 min x 1  1 min x 1 1 minx 1 1 min 1min x 1   Tandem stance 20sec x 5 30 sec x 3 30 sec x 5 30 sec x 3 30 sc x 3 30 sc x 3 30 sec x 3 30 se c x3 20sec x 5 30 sec x 3   squats 30x 10 2 x 10 3 x 10 3 x 10 3 x 10 3 x 10 3 x 10 3 x 10 3 x 10   Ankle sway referencing flat and foam eo, ec 10x  10 reps each 10 reps each 10 reps  10 reps  10 reps close superv 10 10x 10x   Ball pass activities flex/ext , rot , ccw and cw circles ,    10x  10 reps  10 reps   10 reps  10x    Eye head exer  Increase to 10 reps  hep hep 10 reps  10 reps each  10 reps each    At home   vor 1 and 2 exer             Ther Ex             Foam standing eo, ec with vertical and horizontal head turns    10 reps  10 reps  10 x reps  10 x    10 x   Walk and toss ball to self  40ft x 4 40ft x 4   40 ft x 2 withcueing 40 ft x 2 withcueing 40 ft x 2 40ft x 4     Hallway sidestepping ball toss on wall in front  close superv 100ft x 2 100ft x 2 20 ft x 2 20 ft x 2  100ft x 2     Hallway r/l ball toss close superv 100ft x 2 100ft x 2                                                               Ther Activity                                       Gait Training                                       Modalities

## 2023-11-28 ENCOUNTER — OFFICE VISIT (OUTPATIENT)
Dept: HEMATOLOGY ONCOLOGY | Facility: CLINIC | Age: 63
End: 2023-11-28
Payer: COMMERCIAL

## 2023-11-28 VITALS
TEMPERATURE: 97.9 F | HEIGHT: 62 IN | OXYGEN SATURATION: 97 % | DIASTOLIC BLOOD PRESSURE: 86 MMHG | HEART RATE: 90 BPM | BODY MASS INDEX: 34.23 KG/M2 | SYSTOLIC BLOOD PRESSURE: 134 MMHG | WEIGHT: 186 LBS | RESPIRATION RATE: 18 BRPM

## 2023-11-28 DIAGNOSIS — D51.8 VITAMIN B12 DEFICIENCY (DIETARY) ANEMIA: Primary | ICD-10-CM

## 2023-11-28 DIAGNOSIS — D53.9 NUTRITIONAL ANEMIA: ICD-10-CM

## 2023-11-28 DIAGNOSIS — D50.9 MICROCYTIC ANEMIA: ICD-10-CM

## 2023-11-28 DIAGNOSIS — E53.8 FOLATE DEFICIENCY: ICD-10-CM

## 2023-11-28 PROCEDURE — 99213 OFFICE O/P EST LOW 20 MIN: CPT

## 2023-11-28 NOTE — PROGRESS NOTES
HEMATOLOGY / [address] [place] [address] FOLLOW UP NOTE    Primary Care Provider: Yimi Horowitz MD  Referring Provider:    MRN: [id number]  : 1960    Reason for Encounter: follow up microcytic anemia       Interval History:  Patient presents in the follow up of her microcytic anemia. Patient reports she continues to experience side effects from her concussion in July. She has a lot of personal stressors at this time, which she reports are exacerbating her symptoms. Sara Yeager initially received IV iron treatments, Venofer 300 mg x 4 doses (2023 to 2023). Patient has been managed on oral iron supplements since and is tolerating without incident. She continues to have mild fatigue, dizziness and HAs. Denies frequent infections, night sweats, unintentional weight loss, decreased appetite. Patient denies abnormal bleeding: epistaxis, gingival bleeding, hematuria, dark tarry stools. Labs:   23: Hgb 12.1, MCV 85.8, WBC 4.3, ANC 3.17, Platelets 289,270, ferritin 50, serum iron 59, iron saturation 16%, vitamin B12 and folate levels were not resulted. 9/15/2023: Hgb 12.8, MCV 84.8, platelets 783,980, WBC 2.0, ANC 1.01, serum iron 83, iron saturation 22%, ferritin 101.     2023: Hgb 11.9, MCV 73.4, WBC 2.9, ANC 1.76, platelets 352,654, ferritin 19, iron saturation 6%, serum iron 31    2023: Hgb 11.4, MCV 69, WBC 5.7, Platelets 687. REVIEW OF SYSTEMS:  Please note that a 14-point review of systems was performed to include Constitutional, HEENT, Respiratory, CVS, GI, , Musculoskeletal, Integumentary, Neurologic, Rheumatologic, Endocrinologic, Psychiatric, Lymphatic, and Hematologic/Oncologic systems were reviewed and are negative unless otherwise stated in HPI. Positive and negative findings pertinent to this evaluation are incorporated into the history of present illness.       ECOG PS: 0    HPI:  Guanakito Bullock was seen for initial consultation 2023 regarding microcytic anemia. Patient has a PMH of Schreiber's esophagus, hiatal hernia, esophagitis, chronic anemia. She was recently hospitalized on 4/28 for dizziness, cough and vomiting. She was found to have a Hgb of 6.6, ferritin 3, serum iron 13, % saturation 2, Vitamin B12 562, Folate 13.5. She was transfused 1U PRBCs and IV iron. Patient declined further evaluation from GI inpatient and was to follow up with her Gastroenterologist upon discharge. Arabella Villeda is c/o fatigue, dizziness, headaches. Denies PICA, RLS, blood in urine or stool. She is taking oral iron supplements daily. Recent labs: Hgb 11.4, MCV 69, WBC 5.7, Platelets 639. Patient had an EGD completed 11/2022 for her Schreiber's esophagus, no abnormal findings. Her last colonoscopy was 2020, found to have a polyp, otherwise, unremarkable.        Family history:   Maternal Grandmother - pernicious anemia  Father - Esophageal cancer  Mother - uterine cancer    PROBLEM LIST:  Patient Active Problem List   Diagnosis    Lumbar radiculopathy    DDD (degenerative disc disease), lumbar    Spondylolisthesis at L4-L5 level    Localized osteoporosis with current pathological fracture with routine healing    Spinal stenosis of lumbar region with neurogenic claudication    Lumbar spondylosis    T12 compression fracture (HCC)    Synovial cyst of lumbar facet joint    Anxiety    Bulimia nervosa in remission    Constipation    Acquired hypothyroidism    Other fatigue    Serotonin syndrome    Schizoaffective disorder (HCC)    Psychiatric disorder    Dermal hypersensitivity reaction    Essential hypertension    Right hip pain    Chronic bilateral low back pain without sciatica    Severe iron deficiency anemia     Preop exam for internal medicine    NMS (neuroleptic malignant syndrome)    Hypokalemia    Fall    Esophagitis    Hyponatremia    Problem related to living arrangement    Dizziness    Acute non-recurrent maxillary sinusitis    Iron deficiency anemia    Multiple excoriations    Rash    Cellulitis of left upper extremity    Erosive esophagitis    Melena    Hiatal hernia    Polyp of colon    Word finding difficulty    SIRS (systemic inflammatory response syndrome) (HCC)    Hyperlipidemia    Nausea and vomiting    S/P tooth extraction    Upper GI bleed    Pruritus    Self neglect    Microcytic anemia    Schreiber's esophagus    Acute encephalopathy    Thrombocytosis    Abdominal pain    Stress incontinence    Ulcer of toe, chronic, left, with unspecified severity (HCC)    Rheumatoid arthritis involving both feet, unspecified whether rheumatoid factor present (HCC)    Symptomatic anemia    Headache    Acute respiratory failure with hypoxia (HCC)    Sepsis without acute organ dysfunction (HCC)    Post concussion syndrome    Neutropenia ([address])       Assessment / Plan: We reviewed patient's labs in detail. At this time her blood counts are stable. The symptoms she is experiencing is secondary to her concussion. Patient will continue concussion therapy. At this time we will continue to manage with oral iron supplements. We will see patient back in the office in 4 months with labs prior (CBCD, iron panel, vitamin B12 and folate). If patient's counts are found to be stable at that time, she will be discharged back to her PCP for further monitoring. Patient is agreeable with plan. She is aware to contact us for any worsening symptoms and/or if she has any additional questions/concerns. I spent 20 minutes on chart review, face to face counseling time, coordination of care and documentation.     Past Medical History:   has a past medical history of Anemia, Anxiety, Arthritis, Back pain at L4-L5 level, Schreiber esophagus, Bulimia, Colon polyp, Disease of thyroid gland, GERD (gastroesophageal reflux disease), Hearing loss in left ear, History of transfusion, Hyperlipidemia, Hypertension, Hypothyroidism, Leukopenia, NMS (neuroleptic malignant syndrome) (01/03/2020), Pneumonia, RA (rheumatoid arthritis) ([address]), Sciatica, Seizure ([address]), Skin spots, red, Synovial cyst of lumbar spine, Vertigo, Wears dentures, and Weight gain. PAST SURGICAL HISTORY:   has a past surgical history that includes Rhinoplasty; Bone marrow biopsy; Colonoscopy; pr arthrp acetblr/prox fem prostc agrft/algrft (Right, 12/11/2019); Hip Arthroplasty (Right, 01/02/2020); Sinus surgery; Cosmetic surgery; Closed reduction distal femur fracture; pr laps rpr paraesphgl hrna incl fundplsty w/mesh (N/A, 05/11/2021); pr esophagogastroduodenoscopy transoral diagnostic (N/A, 05/11/2021); pr unlisted procedure esophagus (N/A, 05/11/2021); Transoral incisionless fundoplication (TIF) (78/18/2033); Breast surgery; Dental surgery; pr amputation metatarsal w/toe single (Left, 04/07/2023); and Toe amputation (Left). CURRENT MEDICATIONS  Current Outpatient Medications   Medication Sig Dispense Refill    amLODIPine (NORVASC) 5 mg tablet TAKE 1 TABLET (5 MG TOTAL) BY MOUTH DAILY.  90 tablet 3    benzonatate (TESSALON PERLES) 100 mg capsule Take 1 capsule (100 mg total) by mouth 3 (three) times a day as needed for cough 30 capsule 5    celecoxib (CeleBREX) 200 mg capsule Take 1 capsule (200 mg total) by mouth daily 30 capsule 5    [facility] Vitamin B-12 1000 MCG tablet TAKE 1 TABLET BY MOUTH EVERY DAY 90 tablet 1    ferrous sulfate 325 (65 Fe) mg tablet Take 1 tablet (325 mg total) by mouth 2 (two) times a day with meals 60 tablet 0    levothyroxine 25 mcg tablet TAKE 1 TABLET BY MOUTH EVERY DAY 90 tablet 3    LORazepam (ATIVAN) 2 mg tablet Take 1 tablet (2 mg total) by mouth every 6 (six) hours as needed for anxiety 100 tablet 1    pantoprazole (PROTONIX) 40 mg tablet TAKE 1 TABLET BY MOUTH TWICE A  tablet 1    PARoxetine (PAXIL) 30 mg tablet Take 2 tablets (60 mg total) by mouth in the morning 180 tablet 3    predniSONE 20 mg tablet Take by mouth daily      QUEtiapine (SEROquel XR) 400 mg 24 hr tablet Take 1 tablet (400 mg total) by mouth daily at bedtime 90 tablet 3    sucralfate (CARAFATE) 1 g tablet TAKE 1 TABLET (1 G TOTAL) BY MOUTH 3 (THREE) TIMES A DAY WITH MEALS 270 tablet 3    triamcinolone (KENALOG) 0.1 % cream APPLY TOPICALLY 2 TIMES A DAY AS NEEDED FOR RASH. 160 g 5    ziprasidone (GEODON) 80 mg capsule TAKE 1 CAPSULE BY MOUTH EVERY DAY 90 capsule 3    folic acid (FOLVITE) 696 mcg tablet Take 1 tablet (400 mcg total) by mouth daily (Patient not taking: Reported on 11/28/2023) 30 tablet 0    memantine (NAMENDA) 10 mg tablet TAKE 1 TABLET (10 MG TOTAL) BY MOUTH 2 (TWO) TIMES A DAY START AFTER TAPERING UP WITH 5MG TWICE DAILY FOR 2 WEEKS (Patient not taking: Reported on 11/28/2023) 180 tablet 3    memantine (NAMENDA) 5 mg tablet Take 1 tablet (5 mg total) by mouth 2 (two) times a day for 14 days 28 tablet 0     No current facility-administered medications for this visit. [unfilled]    SOCIAL HISTORY:   reports that she has never smoked. She has never used smokeless tobacco. She reports that she does not currently use alcohol. She reports that she does not currently use drugs. FAMILY HISTORY:  family history includes Colon cancer in her maternal grandmother; Esophageal cancer in her father; Uterine cancer in her mother. ALLERGIES:  is allergic to risperdal [risperidone], zyprexa [olanzapine], and latex. Physical Exam:  Vital Signs:   Visit Vitals  /86 (BP Location: Left arm, Patient Position: Sitting, Cuff Size: Adult)   Pulse 90   Temp 97.9 °F (36.6 °C) (Temporal)   Resp 18   Ht 5' 2" (1.575 m)   Wt 84.4 kg (186 lb)   SpO2 97%   BMI 34.02 kg/m²   OB Status Postmenopausal   Smoking Status Never   BSA 1.85 m²     Body mass index is 34.02 kg/m². Body surface area is 1.85 meters squared.     GEN: Alert, awake oriented x3, in no acute distress  HEENT- No pallor, icterus, cyanosis, no oral mucosal lesions,   LAD - no palpable cervical, clavicle, axillary, inguinal LAD  Heart- normal S1 S2, regular rate and rhythm, No murmur, rubs.    Lungs- clear breathing sound bilateral.   Abdomen- soft, Non tender, bowel sounds present  Extremities- No cyanosis, clubbing, edema  Neuro- No focal neurological deficit    Labs:  Lab Results   Component Value Date    WBC 2.65 (L) 09/11/2023    HGB 13.3 09/11/2023    HCT 39.9 09/11/2023    MCV 85 09/11/2023     09/11/2023     Lab Results   Component Value Date     06/01/2017    SODIUM 132 (L) 09/11/2023    K 4.0 09/11/2023     09/11/2023    CO2 27 09/11/2023    AGAP 5 09/11/2023    BUN 9 09/11/2023    CREATININE 0.72 09/11/2023    GLUC 84 09/11/2023    GLUF 126 (H) 02/21/2022    CALCIUM 9.2 09/11/2023    AST 16 09/09/2023    ALT 18 09/09/2023    ALKPHOS 67 09/09/2023    PROT 7.0 06/01/2017    TP 6.3 (L) 09/09/2023    BILITOT 0.2 06/01/2017    TBILI 0.30 09/09/2023    EGFR 89 09/11/2023

## 2023-11-29 ENCOUNTER — TELEPHONE (OUTPATIENT)
Age: 63
End: 2023-11-29

## 2023-11-29 ENCOUNTER — OFFICE VISIT (OUTPATIENT)
Dept: PHYSICAL THERAPY | Age: 63
End: 2023-11-29
Payer: COMMERCIAL

## 2023-11-29 DIAGNOSIS — R42 DIZZINESS: ICD-10-CM

## 2023-11-29 DIAGNOSIS — R26.9 ABNORMALITY OF GAIT: Primary | ICD-10-CM

## 2023-11-29 PROCEDURE — 97530 THERAPEUTIC ACTIVITIES: CPT

## 2023-11-29 PROCEDURE — 97140 MANUAL THERAPY 1/> REGIONS: CPT

## 2023-11-29 PROCEDURE — 97110 THERAPEUTIC EXERCISES: CPT

## 2023-11-29 PROCEDURE — 97112 NEUROMUSCULAR REEDUCATION: CPT

## 2023-11-29 NOTE — PROGRESS NOTES
Daily Note     Today's date: 2023  Patient name: Gwenette Heimlich  : 1960  MRN: [id number]  Referring provider: Jun Raymond MD  Dx:   Encounter Diagnosis     ICD-10-CM    1. Abnormality of gait  R26.9                      Subjective: "I feel dizzy , nauseous and I have blurry vision." Pt reporting poorly today and worse when she is stressed. Objective: See treatment diary below      Assessment: Tolerated treatment fair. Patient demonstrated fatigue post treatment. Pt with left and right nystagmus noted with tammy hallpike testing noted today left greater than right involvement. PT can decrease symptoms with epley maneuver however upon return for next PT session nystagmus  then present again. Plan: Continue per plan of care. Precautions: allergies: latex, zyprexa, risperdal      Manuals 11/8 11/15 11/22 11/29 10/17 10/19 10/24 10/27 11/1 11/3                Left epley  Left epley  Left man 15 min Left man 15min 3 beat nystagmus small oscillations NV  Left man 1 min x 1  Left epley x 1 man   Retest left tammy hallpike    Man left significant oscillations 25 sec right 15 seconds man NV       [employer] balance testing and training        Training x 5 maze and los   3x Perf testing for reassessment today   Neuro Re-Ed             Tandem gait 1 min 1 min x 1 1 min x 1 1 min x 1 1 min x 1 1 min x 1  1 min x 1 1 minx 1 1 min 1min x 1   Tandem stance 20sec x 5 30 sec x 3 30 sec x 5 30 sec x 3 30 sc x 3 30 sc x 3 30 sec x 3 30 se c x3 20sec x 5 30 sec x 3   squats 30x 10 2 x 10 3 x 10 3 x 10 3 x 10 3 x 10 3 x 10 3 x 10 3 x 10   Ankle sway referencing flat and foam eo, ec 10x  10 reps each 10 reps each 10 reps  10 reps  10 reps close superv 10 10x 10x   Ball pass activities flex/ext , rot , ccw and cw circles ,    10x  10 reps  10 reps   10 reps  10x    Eye head exer  Increase to 10 reps  hep hep 10 reps  10 reps each retesting.    10 reps each    At home   vor 1 and 2 exer    10 reps Ther Ex             Foam standing eo, ec with vertical and horizontal head turns    10 reps  10 reps  10 x reps  10 x    10 x   Walk and toss ball to self  40ft x 4 40ft x 4   40 ft x 2 withcueing 40 ft x 2 withcueing 40 ft x 2 40ft x 4     Hallway sidestepping ball toss on wall in front  close superv 100ft x 2 100ft x 2   20 ft x 2 20 ft x 2  100ft x 2     Hallway r/l ball toss close superv 100ft x 2 100ft x 2                                                               Ther Activity                                       Gait Training                                       Modalities

## 2023-11-29 NOTE — TELEPHONE ENCOUNTER
Patient called in say that her concussion symptoms are worsening since July. She is having dizziness,blurry vision, headache,nausea, vomiting, frequent falls. Warm transferred to clinical to triage her.

## 2023-11-30 PROBLEM — D51.8 VITAMIN B12 DEFICIENCY (DIETARY) ANEMIA: Status: ACTIVE | Noted: 2023-11-30

## 2023-11-30 PROBLEM — D53.9 NUTRITIONAL ANEMIA: Status: ACTIVE | Noted: 2023-04-28

## 2023-11-30 PROBLEM — E53.8 FOLATE DEFICIENCY: Status: ACTIVE | Noted: 2023-11-30

## 2023-12-01 ENCOUNTER — OFFICE VISIT (OUTPATIENT)
Dept: PHYSICAL THERAPY | Age: 63
End: 2023-12-01
Payer: COMMERCIAL

## 2023-12-01 DIAGNOSIS — F07.81 POST CONCUSSION SYNDROME: ICD-10-CM

## 2023-12-01 DIAGNOSIS — R42 DIZZINESS: ICD-10-CM

## 2023-12-01 DIAGNOSIS — R26.9 ABNORMALITY OF GAIT: Primary | ICD-10-CM

## 2023-12-01 PROCEDURE — 97112 NEUROMUSCULAR REEDUCATION: CPT

## 2023-12-01 PROCEDURE — 97140 MANUAL THERAPY 1/> REGIONS: CPT

## 2023-12-01 NOTE — PROGRESS NOTES
Daily Note / reassessment of status. Today's date: 2023  Patient name: Elysia Roman  : 1960  MRN: [id number]  Referring provider: Wilberto Love MD  Dx:   Encounter Diagnosis     ICD-10-CM    1. Abnormality of gait  R26.9       2. Post concussion syndrome  F07.81       3. Dizziness  R42                      Subjective: "I feel terrible today. Pt c/o high stress, did not sleep all night due to vomiting all night, c/o dizziness in static sitting and nausea. Objective: See treatment diary below. /79 ,, HR 81 pulse ox 97 percent resting    Pt reporting frontal headache at a level 10 upon entering PT , post myofascial release techniques headaches decreased to a level 6    Visual testing. PT c/o constant blurriness of vision unchanged since I eval ( she states she did see her eye doctor and he said her vision was normal)    Ingris hallpike testing left with 20 sec nystagmus left beating does not resolve with epley maneuver , right  4 beat nystagmus     Tandem gait pt able to perform for 10 ft, tandem stance able to perform for 30 sec, sharpened rhomberg pt falls in 5 seconds. Ambulation pt is able to ambulate without an assistive device  , functional gait assessment  but reports dizziness with bending activities and looking upwards at times. Assessment: Tolerated treatment well for reduction of headache. Pt reports last weeks c/o's of decreased concentration no longer present. She has been recommended to discuss with her doctor how she believed her celebrex medication did decrease her vertigo however the medication is no longer having this effect. Patient would benefit from continued PT and recommend return to neurologist sooner than  . Recommend VNG testing by audiology for central visual processing assessment and r/o vestibular loss in addition to capturing persistent left BPPV symptoms which persist despite multiple epley manuevers performed.   Suggest order for OT vision therapy at [address] [place] [address] Physical therapy neuro center as patient with c/o visual disturbance at times double vision also reported . While PT has made progress with improved overall balance pt appears to present with central and peripheral processing deficits at this time. Plan: Continue per plan of care. Precautions: allergies: latex, zyprexa, risperdal      Manuals 11/8 11/15 11/22 11/29 12/1 10/19 10/24 10/27 11/1 11/3                Left epley  Left epley  Left man 15 min Left man 15min 3 beat nystagmus small oscillations NV  Left man 1 min x 1  Left epley x 1 man   Retest left tammy hallpike    Man left significant oscillations 25 sec right 15 seconds man NV       biodCytoguide balance testing and training        Training x 5 maze and los   3x Perf testing for reassessment today   Neuro man cervical myofascial release Re-Ed     man        Tandem gait 1 min 1 min x 1 1 min x 1 1 min x 1 1 min x 1 1 min x 1  1 min x 1 1 minx 1 1 min 1min x 1   Tandem stance 20sec x 5 30 sec x 3 30 sec x 5 30 sec x 3 30 sc x 3 30 sc x 3 30 sec x 3 30 se c x3 20sec x 5 30 sec x 3   squats 30x 10 2 x 10 3 x 10 3 x 10 3 x 10 3 x 10 3 x 10 3 x 10 3 x 10   Ankle sway referencing flat and foam eo, ec 10x  10 reps each 10 reps each 10 reps  10 reps  10 reps close superv 10 10x 10x   Ball pass activities flex/ext , rot , ccw and cw circles ,    10x    10 reps   10 reps  10x    Eye head exer  Increase to 10 reps  hep hep 10 reps  10 reps each retesting.   10 reps each 10 reps each    At home   vor 1 and 2 exer    10 reps          Ther Ex             Foam standing eo, ec with vertical and horizontal head turns    10 reps  10 reps  10 x reps  10 x    10 x   Walk and toss ball to self  40ft x 4 40ft x 4   40 ft x 2 withcueing 40 ft x 2 withcueing 40 ft x 2 40ft x 4     Hallway sidestepping ball toss on wall in front  close superv 100ft x 2 100ft x 2   20 ft x 2 20 ft x 2  100ft x 2     Hallway r/l ball toss close superv 100ft x 2 100ft x 2                                                               Ther Activity                                       Gait Training                                       Modalities

## 2023-12-04 ENCOUNTER — OFFICE VISIT (OUTPATIENT)
Dept: INTERNAL MEDICINE CLINIC | Facility: CLINIC | Age: 63
End: 2023-12-04
Payer: COMMERCIAL

## 2023-12-04 VITALS
HEART RATE: 90 BPM | BODY MASS INDEX: 34.57 KG/M2 | DIASTOLIC BLOOD PRESSURE: 84 MMHG | SYSTOLIC BLOOD PRESSURE: 138 MMHG | OXYGEN SATURATION: 95 % | WEIGHT: 189 LBS

## 2023-12-04 DIAGNOSIS — R51.9 NONINTRACTABLE HEADACHE, UNSPECIFIED CHRONICITY PATTERN, UNSPECIFIED HEADACHE TYPE: ICD-10-CM

## 2023-12-04 DIAGNOSIS — I10 ESSENTIAL HYPERTENSION: ICD-10-CM

## 2023-12-04 DIAGNOSIS — R42 DIZZINESS: Primary | ICD-10-CM

## 2023-12-04 DIAGNOSIS — E03.9 ACQUIRED HYPOTHYROIDISM: ICD-10-CM

## 2023-12-04 PROCEDURE — 99214 OFFICE O/P EST MOD 30 MIN: CPT | Performed by: INTERNAL MEDICINE

## 2023-12-04 NOTE — PATIENT INSTRUCTIONS
Problem List Items Addressed This Visit          Endocrine    Acquired hypothyroidism     Continue levothyroxine along with monitoring            Cardiovascular and Mediastinum    Essential hypertension     A little elevated at first, then came down to better range, continue med            Other    Dizziness - Primary     Follow up neurology         Relevant Orders    Ambulatory Referral to Audiology    Headache     Follow up neurology, pt is under a lot of stress which can contribute. Pt has gone back to taking Celebrex daily on her own as she reports the every other day was not helping her.

## 2023-12-04 NOTE — PROGRESS NOTES
Name: Rachell Grubbs      : 1960      MRN: [id number]  Encounter Provider: Bonita Lamb MD  Encounter Date: 2023   Enc[facility]: [facility]    Assessment & Plan     1. Dizziness  Assessment & Plan:  Follow up neurology    Orders:  -     Ambulatory Referral to Audiology; Future    2. Nonintractable headache, unspecified chronicity pattern, unspecified headache type  Assessment & Plan:  Follow up neurology, pt is under a lot of stress which can contribute. Pt has gone back to taking Celebrex daily on her own as she reports the every other day was not helping her. 3. Essential hypertension  Assessment & Plan:  A little elevated at first, then came down to better range, continue med      4. Acquired hypothyroidism  Assessment & Plan:  Continue levothyroxine along with monitoring             Subjective     Pt reports she feels "terrible" and that "concussion symptoms are getting worse."  Pt reports on  she had headache, dizziness, nausea, vomiting, fatigue, double vision. Pt under a lot of stress with her mother's medical issues. Pt reports she is being harassed by her mother's attorneys which is causing her a lot of stress. Review of Systems   Constitutional:  Negative for chills, fatigue and fever. HENT:  Negative for congestion, nosebleeds, postnasal drip, sore throat and trouble swallowing. Eyes:  Positive for visual disturbance. Negative for pain. Respiratory:  Negative for cough, chest tightness, shortness of breath and wheezing. Cardiovascular:  Negative for chest pain, palpitations and leg swelling. Gastrointestinal:  Negative for abdominal pain, constipation, diarrhea, nausea and vomiting. Endocrine: Negative for polydipsia and polyuria. Genitourinary:  Negative for dysuria, flank pain and hematuria. Musculoskeletal:  Negative for arthralgias. Skin:  Negative for rash. Neurological:  Positive for dizziness and headaches. Negative for tremors and light-headedness. Hematological:  Does not bruise/bleed easily. Psychiatric/Behavioral:  Positive for dysphoric mood. Negative for confusion. The patient is nervous/anxious. Past Medical History:   Diagnosis Date   • Anemia    • Anxiety    • Arthritis    • Back pain at L4-L5 level    • Schreiber esophagus    • Bulimia    • Colon polyp    • Disease of thyroid gland     hypothyroid   • GERD (gastroesophageal reflux disease)    • Hearing loss in left ear    • History of transfusion    • Hyperlipidemia    • Hypertension    • Hypothyroidism    • Leukopenia    • NMS (neuroleptic malignant syndrome) 01/03/2020   • Pneumonia    • RA (rheumatoid arthritis) ([address])     in feet   • Sciatica    • Seizure ([address])     due to medication mix up 8/2018 only one historically   • Skin spots, red     lower right arm - poss from cat- no s/s infection   • Synovial cyst of lumbar spine    • Vertigo    • Wears dentures     full set   • Weight gain      Past Surgical History:   Procedure Laterality Date   • BONE MARROW BIOPSY     • BREAST SURGERY      enlargement with implants   • CLOSED REDUCTION DISTAL FEMUR FRACTURE     • COLONOSCOPY     • COSMETIC SURGERY     • DENTAL SURGERY     • HIP ARTHROPLASTY Right 01/02/2020    Procedure: REVISION TOTAL HIP ARTHROPLASTY WITH REPAIR OF PARIPROSTHETIC FRACTURE - POSTERIOR APPROACH;  Surgeon: Remigio Holloway MD;  Location: [facility] MA[facility];  Service: Orthopedics   • VT AMPUTATION METATARSAL W/TOE SINGLE Left 04/07/2023    Procedure: AMPUTATION TOE 4th;  Surgeon: Quang Almonte DPM;  Location: [facility] MAIN OR;  Service: Podiatry   • VT ARTHRP ACETBLR/PROX FEM PROSTC AGRFT/ALGRFT Right 12/11/2019    Procedure: ARTHROPLASTY HIP TOTAL ANTERIOR;  Surgeon: Remigio Holloway MD;  Location: [facility];  Service: Orthopedics   • VT ESOPHAGOGASTRODUODENOSCOPY TRANSORAL DIAGNOSTIC N/A 05/11/2021    Procedure: ESOPHAGOGASTRODUODENOSCOPY (EGD); Surgeon:  King Gonzalo MD;  Location: [facility];  Service: General   • NE LAPS RPR PARAESPHGL HRNA INCL FUNDPLSTY W/MESH N/A 05/11/2021    Procedure: REPAIR HERNIA PARAESOPHAGEAL  LAPAROSCOPIC;  Surgeon: Mary Brothers MD;  Location: [facility];  Service: General   • NE UNLISTED PROCEDURE ESOPHAGUS N/A 05/11/2021    Procedure: FUNDOPLICATION TRANSORAL INCISIONLESS;  Surgeon: Pat Pacheco MD;  Location: [facility];  Service: Gastroenterology   • RHINOPLASTY     • SINUS SURGERY     • TOE AMPUTATION Left     2023 4th toe   • TRANSORAL INCISIONLESS FUNDOPLICATION (TIF)  46/15/5479     Family History   Problem Relation Age of Onset   • Uterine cancer Mother    • Esophageal cancer Father    • Colon cancer Maternal Grandmother      Social History     Socioeconomic History   • Marital status: Single     Spouse name: None   • Number of children: None   • Years of education: None   • Highest education level: None   Occupational History   • None   Tobacco Use   • Smoking status: Never   • Smokeless tobacco: Never   Vaping Use   • Vaping Use: Never used   Substance and Sexual Activity   • Alcohol use: Not Currently   • Drug use: Not Currently   • Sexual activity: Not Currently   Other Topics Concern   • None   Social History Narrative    Family disruption death of family member parent, per allscripts     Social Determinants of Health     Financial Resource Strain: Medium Risk (1/4/2023)    Overall Financial Resource Strain (CARDIA)    • Difficulty of Paying Living Expenses: Somewhat hard   Food Insecurity: No Food Insecurity (9/11/2023)    Hunger Vital Sign    • Worried About Running Out of Food in the Last Year: Never true    • Ran Out of Food in the Last Year: Never true   Transportation Needs: No Transportation Needs (9/11/2023)    PRAPARE - Transportation    • Lack of Transportation (Medical): No    • Lack of Transportation (Non-Medical):  No   Physical Activity: Not on file   Stress: Stress Concern Present (5/18/2021)    [address] Occupational Stress Questionnaire    • Feeling of Stress : To some extent   Social Connections: Not on file   Intimate Partner Violence: Not on file   Housing Stability: Low Risk  (9/11/2023)    Housing Stability Vital Sign    • Unable to Pay for Housing in the Last Year: No    • Number of Places Lived in the Last Year: 1    • Unstable Housing in the Last Year: No     Current Outpatient Medications on File Prior to Visit   Medication Sig   • amLODIPine (NORVASC) 5 mg tablet TAKE 1 TABLET (5 MG TOTAL) BY MOUTH DAILY. • benzonatate (TESSALON PERLES) 100 mg capsule Take 1 capsule (100 mg total) by mouth 3 (three) times a day as needed for cough   • celecoxib (CeleBREX) 200 mg capsule Take 1 capsule (200 mg total) by mouth daily   • [facility] Vitamin B-12 1000 MCG tablet TAKE 1 TABLET BY MOUTH EVERY DAY   • ferrous sulfate 325 (65 Fe) mg tablet Take 1 tablet (325 mg total) by mouth 2 (two) times a day with meals   • folic acid (FOLVITE) 367 mcg tablet Take 1 tablet (400 mcg total) by mouth daily   • levothyroxine 25 mcg tablet TAKE 1 TABLET BY MOUTH EVERY DAY   • LORazepam (ATIVAN) 2 mg tablet Take 1 tablet (2 mg total) by mouth every 6 (six) hours as needed for anxiety   • pantoprazole (PROTONIX) 40 mg tablet TAKE 1 TABLET BY MOUTH TWICE A DAY   • PARoxetine (PAXIL) 30 mg tablet Take 2 tablets (60 mg total) by mouth in the morning   • QUEtiapine (SEROquel XR) 400 mg 24 hr tablet Take 1 tablet (400 mg total) by mouth daily at bedtime   • sucralfate (CARAFATE) 1 g tablet TAKE 1 TABLET (1 G TOTAL) BY MOUTH 3 (THREE) TIMES A DAY WITH MEALS   • triamcinolone (KENALOG) 0.1 % cream APPLY TOPICALLY 2 TIMES A DAY AS NEEDED FOR RASH.    • ziprasidone (GEODON) 80 mg capsule TAKE 1 CAPSULE BY MOUTH EVERY DAY   • memantine (NAMENDA) 10 mg tablet TAKE 1 TABLET (10 MG TOTAL) BY MOUTH 2 (TWO) TIMES A DAY START AFTER TAPERING UP WITH 5MG TWICE DAILY FOR 2 WEEKS (Patient not taking: Reported on 11/28/2023)   • memantine (NAMENDA) 5 mg tablet Take 1 tablet (5 mg total) by mouth 2 (two) times a day for 14 days   • predniSONE 20 mg tablet Take by mouth daily (Patient not taking: Reported on 12/4/2023)     Allergies   Allergen Reactions   • Risperdal [Risperidone] Shortness Of Breath     Rapid heart beat, SOB   • Zyprexa [Olanzapine] Shortness Of Breath     Rapid heartbeat   • Latex Rash     Band aids, adhesives wears normal underwear, can eat bananas  USE PAPER TAPE     Immunization History   Administered Date(s) Administered   • COVID-19 PFIZER VACCINE 0.3 ML IM 03/20/2021, 04/10/2021, 10/09/2021   • H1N1, All Formulations 11/17/2009   • INFLUENZA 11/17/2009, 01/04/2013, 11/11/2014, 10/20/2015, 10/31/2016, 09/16/2018   • Influenza Injectable, MDCK, Preservative Free, Quadrivalent, 0.5 mL 09/26/2019   • Influenza Quadrivalent, 6-35 Months IM 10/31/2016   • Influenza, injectable, quadrivalent, preservative free 0.5 mL 09/05/2020, 09/26/2023   • Influenza, recombinant, quadrivalent,injectable, preservative free 11/04/2021   • Pneumococcal Conjugate 13-Valent 09/16/2018   • Rabies 07/29/2014, 08/01/2014, 08/05/2014   • Tdap 07/29/2014, 12/17/2022       Objective     /84   Pulse 90   Wt 85.7 kg (189 lb)   SpO2 95%   BMI 34.57 kg/m²     Physical Exam  Constitutional:       General: She is not in acute distress. Appearance: Normal appearance. She is well-developed. HENT:      Head: Normocephalic and atraumatic. Right Ear: External ear normal.      Left Ear: External ear normal.   Eyes:      General: No scleral icterus. Conjunctiva/sclera: Conjunctivae normal.   Neck:      Thyroid: No thyromegaly. Trachea: No tracheal deviation. Cardiovascular:      Rate and Rhythm: Normal rate and regular rhythm. Heart sounds: Normal heart sounds. Pulmonary:      Effort: Pulmonary effort is normal. No respiratory distress. Breath sounds: Normal breath sounds. No wheezing or rales.    Musculoskeletal:      Cervical back: Normal range of motion and neck supple. Right lower leg: No edema. Left lower leg: No edema. Lymphadenopathy:      Cervical: No cervical adenopathy. Skin:     Coloration: Skin is not jaundiced or pale. Neurological:      Mental Status: She is alert and oriented to person, place, and time. Psychiatric:         Behavior: Behavior normal.         Thought Content:  Thought content normal.         Judgment: Judgment normal.       Gardenia Molina MD

## 2023-12-04 NOTE — ASSESSMENT & PLAN NOTE
Follow up neurology, pt is under a lot of stress which can contribute. Pt has gone back to taking Celebrex daily on her own as she reports the every other day was not helping her.

## 2023-12-12 ENCOUNTER — TELEPHONE (OUTPATIENT)
Age: 63
End: 2023-12-12

## 2023-12-12 DIAGNOSIS — L98.9 SKIN LESIONS: Primary | ICD-10-CM

## 2023-12-12 NOTE — TELEPHONE ENCOUNTER
Patient would like to see a dermatologist for bumps on the right side of neck and polyps under her left eye. She would like to have these removed.

## 2023-12-13 ENCOUNTER — TELEPHONE (OUTPATIENT)
Age: 63
End: 2023-12-13

## 2023-12-15 DIAGNOSIS — L98.9 SKIN LESIONS: Primary | ICD-10-CM

## 2023-12-15 NOTE — TELEPHONE ENCOUNTER
Patient called back with the dermatologist in network with her insurance plan.  She is scheduled on 4/11/24 with Dr. Larry at  [facility], [place], [address], [place]  [address] [phone].

## 2023-12-22 ENCOUNTER — TELEPHONE (OUTPATIENT)
Age: 63
End: 2023-12-22

## 2023-12-22 DIAGNOSIS — F41.9 ANXIETY: ICD-10-CM

## 2023-12-22 RX ORDER — LORAZEPAM 2 MG/1
2 TABLET ORAL EVERY 6 HOURS PRN
Qty: 100 TABLET | Refills: 1 | Status: SHIPPED | OUTPATIENT
Start: 2023-12-22

## 2023-12-28 NOTE — TELEPHONE ENCOUNTER
Pt did call back again with Dr. Larry information. She said she hasn't heard anything from Dr. Cain yet about the referral and wants to see Dr. Larry.   I didn't see this interaction until after she hung up. Please advise pt.

## 2024-01-03 NOTE — TELEPHONE ENCOUNTER
Patient returning call. Patient was made aware referral was placed when she asked for it. No further action needed at this time.

## 2024-01-07 ENCOUNTER — NURSE TRIAGE (OUTPATIENT)
Dept: OTHER | Facility: OTHER | Age: 64
End: 2024-01-07

## 2024-01-07 NOTE — TELEPHONE ENCOUNTER
"Reason for Disposition   Common cold with no complications    Answer Assessment - Initial Assessment Questions  1. ONSET: \"When did the nasal discharge start?\"       Today    2. AMOUNT: \"How much discharge is there?\"       Clear nasal drainage    3. COUGH: \"Do you have a cough?\" If yes, ask: \"Describe the color of your sputum\" (clear, white, yellow, green)      Denies    4. RESPIRATORY DISTRESS: \"Describe your breathing.\"       Denies chest pain and denies SOB.     5. FEVER: \"Do you have a fever?\" If Yes, ask: \"What is your temperature, how was it measured, and when did it start?\"      Temp: 99 oral    6. SEVERITY: \"Overall, how bad are you feeling right now?\" (e.g., doesn't interfere with normal activities, staying home from school/work, staying in bed)       Feels unwell.    7. OTHER SYMPTOMS: \"Do you have any other symptoms?\" (e.g., sore throat, earache, wheezing, vomiting)     No other symptoms.    Protocols used: Common Cold-ADULT-AH      Pt concerned about possible COVID exposure. Says her mom is in [facility] and the woman pts mother shares a room with tested positive for COVID. Pt was only around her mom- does not know if her mom is positive for COVID, does not think her mother presented with any symptoms. Pt was last with her mom on Friday. Pt has not tested herself with at home COVID test.     "

## 2024-01-07 NOTE — TELEPHONE ENCOUNTER
"Regarding: Medical Advice  ----- Message from Jessica Saldaña sent at 1/7/2024  7:53 AM EST -----  \" My mom is at [facility] and the lady across the solorio from her was taken 10 th floor which is the covid floor. What should I do?\"    "

## 2024-01-10 NOTE — ANESTHESIA PREPROCEDURE EVALUATION
Procedure:  EGD    Relevant Problems   CARDIO   (+) Essential hypertension   (+) Hyperlipidemia      ENDO   (+) Acquired hypothyroidism      GI/HEPATIC   (+) Erosive esophagitis   (+) Gastroesophageal reflux disease with esophagitis without hemorrhage   (+) Hiatal hernia with gastroesophageal reflux disease and esophagitis   (+) Upper GI bleed      HEMATOLOGY   (+) Iron deficiency anemia due to chronic blood loss   (+) Symptomatic anemia      MUSCULOSKELETAL   (+) Chronic bilateral low back pain without sciatica   (+) DDD (degenerative disc disease), lumbar   (+) Hiatal hernia with gastroesophageal reflux disease and esophagitis   (+) Lumbar spondylosis      NEURO/PSYCH   (+) Anxiety   (+) ABIMAEL (generalized anxiety disorder)   (+) Word finding difficulty      Other   (+) Bulimia nervosa in remission   (+) Localized osteoporosis with current pathological fracture with routine healing   (+) Lumbar radiculopathy   (+) Nausea and vomiting   (+) Rash   (+) Schizoaffective disorder (HCC)   (+) Spinal stenosis of lumbar region with neurogenic claudication   (+) Spondylolisthesis at L4-L5 level   (+) T12 compression fracture (HCC)        Physical Exam    Airway    Mallampati score: II  TM Distance: <3 FB  Neck ROM: full     Dental       Cardiovascular      Pulmonary      Other Findings        Anesthesia Plan  ASA Score- 3     Anesthesia Type- IV sedation with anesthesia with ASA Monitors  Additional Monitors:   Airway Plan:           Plan Factors-Exercise tolerance (METS): >4 METS  Chart reviewed  EKG reviewed  Imaging results reviewed  Existing labs reviewed  Patient summary reviewed  Induction- intravenous  Postoperative Plan-     Informed Consent- Anesthetic plan and risks discussed with patient  I personally reviewed this patient with the CRNA  Discussed and agreed on the Anesthesia Plan with the CRNA  Raghav Grant No

## 2024-01-10 NOTE — TELEPHONE ENCOUNTER
Patient called back she stated that her only symptoms are congestion and body aches . I offered patient an appt virtual or to come in and she  refused both . Patient also stated that she had a concussion and that she felt dizzy and confused patient was told to go to the ER. She lives alone with no family members. Patient agreed to go to the ER. Can we keep this task open to follow up with her to make sure that she went to the ER.  Melanie Marcelino

## 2024-01-11 ENCOUNTER — APPOINTMENT (EMERGENCY)
Dept: RADIOLOGY | Facility: HOSPITAL | Age: 64
DRG: 641 | End: 2024-01-11
Payer: COMMERCIAL

## 2024-01-11 ENCOUNTER — HOSPITAL ENCOUNTER (INPATIENT)
Facility: HOSPITAL | Age: 64
LOS: 1 days | Discharge: HOME | DRG: 641 | End: 2024-01-13
Attending: EMERGENCY MEDICINE | Admitting: STUDENT IN AN ORGANIZED HEALTH CARE EDUCATION/TRAINING PROGRAM
Payer: COMMERCIAL

## 2024-01-11 DIAGNOSIS — R53.1 WEAKNESS: ICD-10-CM

## 2024-01-11 DIAGNOSIS — F25.1 SCHIZOAFFECTIVE DISORDER, DEPRESSIVE TYPE (HCC): ICD-10-CM

## 2024-01-11 DIAGNOSIS — E86.0 DEHYDRATION: Primary | ICD-10-CM

## 2024-01-11 DIAGNOSIS — R47.9 DIFFICULTY WITH SPEECH: ICD-10-CM

## 2024-01-11 DIAGNOSIS — E87.1 HYPONATREMIA: ICD-10-CM

## 2024-01-11 DIAGNOSIS — F99 PSYCHIATRIC DISORDER: ICD-10-CM

## 2024-01-11 DIAGNOSIS — R42 DIZZINESS: ICD-10-CM

## 2024-01-11 LAB
2HR DELTA HS TROPONIN: -2 NG/L
ALBUMIN SERPL BCP-MCNC: 4.2 G/DL (ref 3.5–5)
ALP SERPL-CCNC: 72 U/L (ref 34–104)
ALT SERPL W P-5'-P-CCNC: 17 U/L (ref 7–52)
ANION GAP SERPL CALCULATED.3IONS-SCNC: 6 MMOL/L
ANION GAP SERPL CALCULATED.3IONS-SCNC: 7 MMOL/L
AST SERPL W P-5'-P-CCNC: 34 U/L (ref 13–39)
BASOPHILS # BLD AUTO: 0.06 THOUSANDS/ÂΜL (ref 0–0.1)
BASOPHILS NFR BLD AUTO: 1 % (ref 0–1)
BILIRUB SERPL-MCNC: 0.45 MG/DL (ref 0.2–1)
BILIRUB UR QL STRIP: NEGATIVE
BUN SERPL-MCNC: 11 MG/DL (ref 5–25)
BUN SERPL-MCNC: 9 MG/DL (ref 5–25)
CALCIUM SERPL-MCNC: 10.1 MG/DL (ref 8.4–10.2)
CALCIUM SERPL-MCNC: 10.1 MG/DL (ref 8.4–10.2)
CARDIAC TROPONIN I PNL SERPL HS: 5 NG/L
CARDIAC TROPONIN I PNL SERPL HS: 7 NG/L
CHLORIDE SERPL-SCNC: 87 MMOL/L (ref 96–108)
CHLORIDE SERPL-SCNC: 97 MMOL/L (ref 96–108)
CLARITY UR: CLEAR
CO2 SERPL-SCNC: 30 MMOL/L (ref 21–32)
CO2 SERPL-SCNC: 31 MMOL/L (ref 21–32)
COLOR UR: COLORLESS
CREAT SERPL-MCNC: 0.6 MG/DL (ref 0.6–1.3)
CREAT SERPL-MCNC: 0.64 MG/DL (ref 0.6–1.3)
EOSINOPHIL # BLD AUTO: 0.12 THOUSAND/ÂΜL (ref 0–0.61)
EOSINOPHIL NFR BLD AUTO: 2 % (ref 0–6)
ERYTHROCYTE [DISTWIDTH] IN BLOOD BY AUTOMATED COUNT: 13.2 % (ref 11.6–15.1)
FLUAV RNA RESP QL NAA+PROBE: NEGATIVE
FLUBV RNA RESP QL NAA+PROBE: NEGATIVE
GFR SERPL CREATININE-BSD FRML MDRD: 95 ML/MIN/1.73SQ M
GFR SERPL CREATININE-BSD FRML MDRD: 97 ML/MIN/1.73SQ M
GLUCOSE SERPL-MCNC: 106 MG/DL (ref 65–140)
GLUCOSE SERPL-MCNC: 110 MG/DL (ref 65–140)
GLUCOSE UR STRIP-MCNC: NEGATIVE MG/DL
HCT VFR BLD AUTO: 39.4 % (ref 34.8–46.1)
HGB BLD-MCNC: 13.4 G/DL (ref 11.5–15.4)
HGB UR QL STRIP.AUTO: NEGATIVE
IMM GRANULOCYTES # BLD AUTO: 0.02 THOUSAND/UL (ref 0–0.2)
IMM GRANULOCYTES NFR BLD AUTO: 0 % (ref 0–2)
KETONES UR STRIP-MCNC: NEGATIVE MG/DL
LEUKOCYTE ESTERASE UR QL STRIP: NEGATIVE
LYMPHOCYTES # BLD AUTO: 0.92 THOUSANDS/ÂΜL (ref 0.6–4.47)
LYMPHOCYTES NFR BLD AUTO: 12 % (ref 14–44)
MCH RBC QN AUTO: 28.8 PG (ref 26.8–34.3)
MCHC RBC AUTO-ENTMCNC: 34 G/DL (ref 31.4–37.4)
MCV RBC AUTO: 85 FL (ref 82–98)
MONOCYTES # BLD AUTO: 0.52 THOUSAND/ÂΜL (ref 0.17–1.22)
MONOCYTES NFR BLD AUTO: 7 % (ref 4–12)
NEUTROPHILS # BLD AUTO: 5.9 THOUSANDS/ÂΜL (ref 1.85–7.62)
NEUTS SEG NFR BLD AUTO: 78 % (ref 43–75)
NITRITE UR QL STRIP: NEGATIVE
NRBC BLD AUTO-RTO: 0 /100 WBCS
PH UR STRIP.AUTO: 7 [PH]
PLATELET # BLD AUTO: 282 THOUSANDS/UL (ref 149–390)
PMV BLD AUTO: 8.7 FL (ref 8.9–12.7)
POTASSIUM SERPL-SCNC: 3.2 MMOL/L (ref 3.5–5.3)
POTASSIUM SERPL-SCNC: 3.9 MMOL/L (ref 3.5–5.3)
PROT SERPL-MCNC: 7.1 G/DL (ref 6.4–8.4)
PROT UR STRIP-MCNC: NEGATIVE MG/DL
RBC # BLD AUTO: 4.66 MILLION/UL (ref 3.81–5.12)
RSV RNA RESP QL NAA+PROBE: NEGATIVE
SARS-COV-2 RNA RESP QL NAA+PROBE: NEGATIVE
SODIUM SERPL-SCNC: 124 MMOL/L (ref 135–147)
SODIUM SERPL-SCNC: 134 MMOL/L (ref 135–147)
SP GR UR STRIP.AUTO: 1 (ref 1–1.03)
UROBILINOGEN UR STRIP-ACNC: <2 MG/DL
WBC # BLD AUTO: 7.54 THOUSAND/UL (ref 4.31–10.16)

## 2024-01-11 PROCEDURE — 36415 COLL VENOUS BLD VENIPUNCTURE: CPT | Performed by: EMERGENCY MEDICINE

## 2024-01-11 PROCEDURE — 70450 CT HEAD/BRAIN W/O DYE: CPT

## 2024-01-11 PROCEDURE — 96366 THER/PROPH/DIAG IV INF ADDON: CPT

## 2024-01-11 PROCEDURE — G1004 CDSM NDSC: HCPCS

## 2024-01-11 PROCEDURE — 99223 1ST HOSP IP/OBS HIGH 75: CPT | Performed by: STUDENT IN AN ORGANIZED HEALTH CARE EDUCATION/TRAINING PROGRAM

## 2024-01-11 PROCEDURE — 81003 URINALYSIS AUTO W/O SCOPE: CPT | Performed by: EMERGENCY MEDICINE

## 2024-01-11 PROCEDURE — 99284 EMERGENCY DEPT VISIT MOD MDM: CPT

## 2024-01-11 PROCEDURE — 85025 COMPLETE CBC W/AUTO DIFF WBC: CPT | Performed by: EMERGENCY MEDICINE

## 2024-01-11 PROCEDURE — 80048 BASIC METABOLIC PNL TOTAL CA: CPT | Performed by: STUDENT IN AN ORGANIZED HEALTH CARE EDUCATION/TRAINING PROGRAM

## 2024-01-11 PROCEDURE — 96365 THER/PROPH/DIAG IV INF INIT: CPT

## 2024-01-11 PROCEDURE — 80053 COMPREHEN METABOLIC PANEL: CPT | Performed by: EMERGENCY MEDICINE

## 2024-01-11 PROCEDURE — 84484 ASSAY OF TROPONIN QUANT: CPT | Performed by: EMERGENCY MEDICINE

## 2024-01-11 PROCEDURE — 93005 ELECTROCARDIOGRAM TRACING: CPT

## 2024-01-11 PROCEDURE — 0241U HB NFCT DS VIR RESP RNA 4 TRGT: CPT | Performed by: EMERGENCY MEDICINE

## 2024-01-11 PROCEDURE — 99285 EMERGENCY DEPT VISIT HI MDM: CPT | Performed by: EMERGENCY MEDICINE

## 2024-01-11 RX ORDER — LANOLIN ALCOHOL/MO/W.PET/CERES
400 CREAM (GRAM) TOPICAL DAILY
Status: DISCONTINUED | OUTPATIENT
Start: 2024-01-11 | End: 2024-01-13 | Stop reason: HOSPADM

## 2024-01-11 RX ORDER — LEVOTHYROXINE SODIUM 0.03 MG/1
25 TABLET ORAL
Status: DISCONTINUED | OUTPATIENT
Start: 2024-01-12 | End: 2024-01-13 | Stop reason: HOSPADM

## 2024-01-11 RX ORDER — SODIUM CHLORIDE 9 MG/ML
100 INJECTION, SOLUTION INTRAVENOUS CONTINUOUS
Status: DISCONTINUED | OUTPATIENT
Start: 2024-01-11 | End: 2024-01-11

## 2024-01-11 RX ORDER — SODIUM CHLORIDE 450 MG/100ML
75 INJECTION, SOLUTION INTRAVENOUS CONTINUOUS
Status: DISCONTINUED | OUTPATIENT
Start: 2024-01-11 | End: 2024-01-11

## 2024-01-11 RX ORDER — QUETIAPINE 200 MG/1
400 TABLET, FILM COATED, EXTENDED RELEASE ORAL
Status: DISCONTINUED | OUTPATIENT
Start: 2024-01-11 | End: 2024-01-13 | Stop reason: HOSPADM

## 2024-01-11 RX ORDER — POTASSIUM CHLORIDE 20 MEQ/1
40 TABLET, EXTENDED RELEASE ORAL ONCE
Status: COMPLETED | OUTPATIENT
Start: 2024-01-11 | End: 2024-01-11

## 2024-01-11 RX ORDER — SUCRALFATE 1 G/1
1 TABLET ORAL
Status: DISCONTINUED | OUTPATIENT
Start: 2024-01-11 | End: 2024-01-13 | Stop reason: HOSPADM

## 2024-01-11 RX ORDER — LORAZEPAM 1 MG/1
2 TABLET ORAL EVERY 6 HOURS PRN
Status: DISCONTINUED | OUTPATIENT
Start: 2024-01-11 | End: 2024-01-13 | Stop reason: HOSPADM

## 2024-01-11 RX ORDER — PANTOPRAZOLE SODIUM 40 MG/1
40 TABLET, DELAYED RELEASE ORAL
Status: DISCONTINUED | OUTPATIENT
Start: 2024-01-11 | End: 2024-01-13 | Stop reason: HOSPADM

## 2024-01-11 RX ORDER — DEXTROSE MONOHYDRATE 50 MG/ML
75 INJECTION, SOLUTION INTRAVENOUS CONTINUOUS
Status: DISCONTINUED | OUTPATIENT
Start: 2024-01-11 | End: 2024-01-12

## 2024-01-11 RX ORDER — PAROXETINE HYDROCHLORIDE 20 MG/1
60 TABLET, FILM COATED ORAL DAILY
Status: DISCONTINUED | OUTPATIENT
Start: 2024-01-11 | End: 2024-01-13 | Stop reason: HOSPADM

## 2024-01-11 RX ORDER — ZIPRASIDONE HYDROCHLORIDE 40 MG/1
80 CAPSULE ORAL DAILY
Status: DISCONTINUED | OUTPATIENT
Start: 2024-01-11 | End: 2024-01-13 | Stop reason: HOSPADM

## 2024-01-11 RX ORDER — HEPARIN SODIUM 5000 [USP'U]/ML
5000 INJECTION, SOLUTION INTRAVENOUS; SUBCUTANEOUS EVERY 8 HOURS SCHEDULED
Status: DISCONTINUED | OUTPATIENT
Start: 2024-01-11 | End: 2024-01-13 | Stop reason: HOSPADM

## 2024-01-11 RX ORDER — AMLODIPINE BESYLATE 5 MG/1
5 TABLET ORAL DAILY
Status: DISCONTINUED | OUTPATIENT
Start: 2024-01-11 | End: 2024-01-13 | Stop reason: HOSPADM

## 2024-01-11 RX ORDER — CELECOXIB 200 MG/1
200 CAPSULE ORAL DAILY
Status: DISCONTINUED | OUTPATIENT
Start: 2024-01-11 | End: 2024-01-13 | Stop reason: HOSPADM

## 2024-01-11 RX ADMIN — HEPARIN SODIUM 5000 UNITS: 5000 INJECTION INTRAVENOUS; SUBCUTANEOUS at 22:33

## 2024-01-11 RX ADMIN — QUETIAPINE FUMARATE 400 MG: 200 TABLET, EXTENDED RELEASE ORAL at 22:40

## 2024-01-11 RX ADMIN — PANTOPRAZOLE SODIUM 40 MG: 40 TABLET, DELAYED RELEASE ORAL at 15:47

## 2024-01-11 RX ADMIN — SODIUM CHLORIDE, SODIUM LACTATE, POTASSIUM CHLORIDE, AND CALCIUM CHLORIDE 1000 ML: .6; .31; .03; .02 INJECTION, SOLUTION INTRAVENOUS at 09:03

## 2024-01-11 RX ADMIN — SUCRALFATE 1 G: 1 TABLET ORAL at 15:46

## 2024-01-11 RX ADMIN — DEXTROSE 50 ML/HR: 5 SOLUTION INTRAVENOUS at 22:21

## 2024-01-11 RX ADMIN — POTASSIUM CHLORIDE 40 MEQ: 1500 TABLET, EXTENDED RELEASE ORAL at 22:28

## 2024-01-11 RX ADMIN — SODIUM CHLORIDE 100 ML/HR: 0.9 INJECTION, SOLUTION INTRAVENOUS at 15:50

## 2024-01-11 NOTE — H&P
[facility]  H&P  Name: Irene Plascencia 63 y.o. female I MRN: [id number]  Unit/Bed#: ED 25 I Date of Admission: 1/11/2024   Date of Service: 1/11/2024 I Hospital Day: 0      Assessment/Plan   * Difficulty with speech  Assessment & Plan  Patient endorsing some slurring of her speech since about Sunday.  She notes that she has been feeling under the weather for the past several days.  Her mother is in a facility and there is a COVID outbreak there. Patient worried about possibly having COVID infection.  Neuro exam nonfocal but patient does have seldom slurring of speech during our conversation.  She has some psychomotor slowing and I am unsure how chronic this is.  She was ambulating around the room and otherwise seems functional  Obs, telemetry, awaiting CT head results.  If patient remains stable overnight would consider DC home if cleared by PT.  If changing neurologic exam or increased level of concern, would likely consider inpatient MRI and neuro consult if positive    Dizziness  Assessment & Plan  Patient endorsing chronic dizziness which she believes is related to postconcussive syndrome after a clothes display fell on her head at [facility] in July 2023  She has followed up with neurology since then.  Feels like her symptoms presently are a little worse than usual.  No focal neurologic deficits.  CT head ordered and will follow-up results.  PT/OT ordered, patient states she sees outpatient PT and OT for vestibular training  Meclizine as needed.  Attempt conservative measures and if symptoms unrelieved or neurologic exam changing, consider inpatient MRI    Hyponatremia  Assessment & Plan  Patient with chronic hyponatremia likely related to her multiple psych medications.  Comes in with sodium of 122  She is hypochloremic and endorses poor p.o. intake, suspect she may be a little prerenal.  She was given 1 L of LR in the ED  Will gently hydrate with normal saline and  "check BMP every 8 hour.  Because patient already received IV fluid, I am unsure the utility of urine studies at this time  Presently I do not suspect that patient's current sodium level is contributing to her current medical issues    Schizoaffective disorder (HCC)  Assessment & Plan  Patient on significant regimen including Geodon 80 daily, Seroquel 400 nightly, lorazepam 2 mg p.o. every 6 hours, Paxil 60 mg daily  Reviewed EKG with normal QTc interval  She tells me that she has not seen her psychiatrist in 3 years because he moved to another state, but she is reluctant to consider medication changes because \"these are the only things that work\"  I do not know necessarily that meds need to be adjusted while in hospital, but it is possible that medication side effect could be contributing to some of her dizziness and possible speech issues.  Recommend she establish with a psychiatrist after discharge.  If growing concern while patient is in hospital, consider psych consult    Acquired hypothyroidism  Assessment & Plan  Resume levothyroxine 25 mcg daily, recent TSH in 9/9/2023 WNL         VTE Pharmacologic Prophylaxis:   Moderate Risk (Score 3-4) - Pharmacological DVT Prophylaxis Ordered: heparin.  Code Status: Level 1 - Full Code   Discussion with family: Patient declined call to .     Anticipated Length of Stay: Patient will be admitted on an observation basis with an anticipated length of stay of less than 2 midnights secondary to Dizziness.    Total Time Spent on Date of Encounter in care of patient: 45 mins. This time was spent on one or more of the following: performing physical exam; counseling and coordination of care; obtaining or reviewing history; documenting in the medical record; reviewing/ordering tests, medications or procedures; communicating with other healthcare professionals and discussing with patient's family/caregivers.    Chief Complaint: Dizziness    History of Present " "Illness:  Irene Plascencia is a 63 y.o. female with a PMH of postconcussive syndrome, hyperlipidemia, hypertension, hypothyroidism, pneumonia, and schizoaffective disorder disorder who presents with generalized weakness, dizziness, slurring of speech, and decreased appetite.  Patient with multiple vague complaints; when I asked her what is bothering her she said \"everything.\" She states that she has been in her usual state of health until around Sunday. She has been visiting her mom who is in a facility and there was a COVID outbreak.  Patient is concerned that she may have contracted COVID.  She came to the ED for workup which is generally unremarkable however due to occasionally slurred speech she was referred for admission. She reports that her baseline health admittedly is not fantastic; she reports postconcussive syndrome after an [place] a shopping [place] last year.  Since then she has had chronic headaches and dizziness.  She has followed up with neuro in the outpatient setting for this.    Review of Systems:  Review of Systems   Constitutional:  Negative for chills and fever.   HENT:  Negative for ear pain and sore throat.    Eyes:  Negative for pain and visual disturbance.   Respiratory:  Negative for cough and shortness of breath.    Cardiovascular:  Negative for chest pain and palpitations.   Gastrointestinal:  Negative for abdominal pain and vomiting.   Genitourinary:  Negative for dysuria and hematuria.   Musculoskeletal:  Negative for arthralgias and back pain.   Skin:  Negative for color change and rash.   Neurological:  Positive for dizziness and weakness. Negative for syncope.   All other systems reviewed and are negative.      Past Medical and Surgical History:   Past Medical History:   Diagnosis Date    Anemia     Anxiety     Arthritis     Back pain at L4-L5 level     Schreiber esophagus     Bulimia     Colon polyp     Disease of thyroid gland     hypothyroid    GERD (gastroesophageal reflux " disease)     Hearing loss in left ear     History of transfusion     Hyperlipidemia     Hypertension     Hypothyroidism     Leukopenia     NMS (neuroleptic malignant syndrome) 01/03/2020    Pneumonia     RA (rheumatoid arthritis) (HCC)     in feet    Sciatica     Seizure (HCC)     due to medication mix up 8/2018 only one historically    Skin spots, red     lower right arm - poss from cat- no s/s infection    Synovial cyst of lumbar spine     Vertigo     Wears dentures     full set    Weight gain        Past Surgical History:   Procedure Laterality Date    BONE MARROW BIOPSY      BREAST SURGERY      enlargement with implants    CLOSED REDUCTION DISTAL FEMUR FRACTURE      COLONOSCOPY      COSMETIC SURGERY      DENTAL SURGERY      HIP ARTHROPLASTY Right 01/02/2020    Procedure: REVISION TOTAL HIP ARTHROPLASTY WITH REPAIR OF PARIPROSTHETIC FRACTURE - POSTERIOR APPROACH;  Surgeon: Dilan Pedersen MD;  Location: [facility];  Service: Orthopedics    TX AMPUTATION METATARSAL W/TOE SINGLE Left 04/07/2023    Procedure: AMPUTATION TOE 4th;  Surgeon: Conner Bartlett DPM;  Location:  MAIN OR;  Service: Podiatry    TX ARTHRP ACETBLR/PROX FEM PROSTC AGRFT/ALGRFT Right 12/11/2019    Procedure: ARTHROPLASTY HIP TOTAL ANTERIOR;  Surgeon: Dilan Pedersen MD;  Location: [facility];  Service: Orthopedics    TX ESOPHAGOGASTRODUODENOSCOPY TRANSORAL DIAGNOSTIC N/A 05/11/2021    Procedure: ESOPHAGOGASTRODUODENOSCOPY (EGD);  Surgeon: David Cedeño MD;  Location: [facility];  Service: General    TX LAPS RPR PARAESPHGL HRNA INCL FUNDPLSTY W/MESH N/A 05/11/2021    Procedure: REPAIR HERNIA PARAESOPHAGEAL  LAPAROSCOPIC;  Surgeon: David Cedeño MD;  Location: [facility];  Service: General    TX UNLISTED PROCEDURE ESOPHAGUS N/A 05/11/2021    Procedure: FUNDOPLICATION TRANSORAL INCISIONLESS;  Surgeon: Brad Cadet MD;  Location: [facility];  Service: Gastroenterology    RHINOPLASTY      SINUS SURGERY      TOE AMPUTATION Left     2023 4th toe     TRANSORAL INCISIONLESS FUNDOPLICATION (TIF)  05/11/2021       Meds/Allergies:  Prior to Admission medications    Medication Sig Start Date End Date Taking? Authorizing Provider   amLODIPine (NORVASC) 5 mg tablet TAKE 1 TABLET (5 MG TOTAL) BY MOUTH DAILY. 1/24/23   Raheem Cain MD   benzonatate (TESSALON PERLES) 100 mg capsule Take 1 capsule (100 mg total) by mouth 3 (three) times a day as needed for cough 8/22/23   Raheem Cain MD   celecoxib (CeleBREX) 200 mg capsule Take 1 capsule (200 mg total) by mouth daily 10/3/23   Raheem Cain MD   [facility] Vitamin B-12 1000 MCG tablet TAKE 1 TABLET BY MOUTH EVERY DAY 10/3/23   ABENA Castorena   ferrous sulfate 325 (65 Fe) mg tablet Take 1 tablet (325 mg total) by mouth 2 (two) times a day with meals 4/29/23   Char Clayton MD   folic acid (FOLVITE) 400 mcg tablet Take 1 tablet (400 mcg total) by mouth daily 6/2/23   ABENA Castorena   levothyroxine 25 mcg tablet TAKE 1 TABLET BY MOUTH EVERY DAY 4/2/23   Raheem Cain MD   LORazepam (ATIVAN) 2 mg tablet Take 1 tablet (2 mg total) by mouth every 6 (six) hours as needed for anxiety 12/22/23   Raheem Cain MD   memantine (NAMENDA) 10 mg tablet TAKE 1 TABLET (10 MG TOTAL) BY MOUTH 2 (TWO) TIMES A DAY START AFTER TAPERING UP WITH 5MG TWICE DAILY FOR 2 WEEKS  Patient not taking: Reported on 11/28/2023 10/19/23   Tex Mcgovern DO   memantine (NAMENDA) 5 mg tablet Take 1 tablet (5 mg total) by mouth 2 (two) times a day for 14 days 10/5/23 10/19/23  Tex Mcgovern DO   pantoprazole (PROTONIX) 40 mg tablet TAKE 1 TABLET BY MOUTH TWICE A DAY 11/16/23   Raheem Cain MD   PARoxetine (PAXIL) 30 mg tablet Take 2 tablets (60 mg total) by mouth in the morning 5/22/23   Raheem Cain MD   predniSONE 20 mg tablet Take by mouth daily  Patient not taking: Reported on 12/4/2023    Historical Provider, MD   QUEtiapine (SEROquel XR) 400 mg 24 hr tablet Take 1 tablet (400 mg total) by mouth daily at bedtime 5/26/23   Raheem  MD Ant   sucralfate (CARAFATE) 1 g tablet TAKE 1 TABLET (1 G TOTAL) BY MOUTH 3 (THREE) TIMES A DAY WITH MEALS 3/25/23   Raheem Cain MD   triamcinolone (KENALOG) 0.1 % cream APPLY TOPICALLY 2 TIMES A DAY AS NEEDED FOR RASH. 5/24/23   Raheem Cain MD   ziprasidone (GEODON) 80 mg capsule TAKE 1 CAPSULE BY MOUTH EVERY DAY 5/24/23   Raheem Cain MD     I have reviewed home medications with patient personally.    Allergies:   Allergies   Allergen Reactions    Risperdal [Risperidone] Shortness Of Breath     Rapid heart beat, SOB    Zyprexa [Olanzapine] Shortness Of Breath     Rapid heartbeat    Latex Rash     Band aids, adhesives wears normal underwear, can eat bananas  USE PAPER TAPE       Social History:  Marital Status: Single   Occupation: Not on file  Patient Pre-hospital Living Situation: Home  Patient Pre-hospital Level of Mobility: walks  Patient Pre-hospital Diet Restrictions: N/A  Substance Use History:   Social History     Substance and Sexual Activity   Alcohol Use Not Currently     Social History     Tobacco Use   Smoking Status Never   Smokeless Tobacco Never     Social History     Substance and Sexual Activity   Drug Use Not Currently       Family History:  Family History   Problem Relation Age of Onset    Uterine cancer Mother     Esophageal cancer Father     Colon cancer Maternal Grandmother        Physical Exam:     Vitals:   Blood Pressure: 129/81 (01/11/24 1410)  Pulse: 86 (01/11/24 1410)  Temperature: 99.3 °F (37.4 °C) (01/11/24 0811)  Temp Source: Oral (01/11/24 0811)  Respirations: 18 (01/11/24 1410)  SpO2: 94 % (01/11/24 1410)    Physical Exam  Vitals reviewed.   Constitutional:       General: She is not in acute distress.     Appearance: She is not ill-appearing.   HENT:      Head: Normocephalic.      Mouth/Throat:      Mouth: Mucous membranes are moist.   Eyes:      General: No scleral icterus.     Extraocular Movements: Extraocular movements intact.   Cardiovascular:      Rate and  Rhythm: Normal rate and regular rhythm.      Pulses: Normal pulses.      Heart sounds: No murmur heard.     No friction rub. No gallop.   Pulmonary:      Effort: Pulmonary effort is normal. No respiratory distress.      Breath sounds: No wheezing, rhonchi or rales.   Abdominal:      General: Abdomen is flat. Bowel sounds are normal. There is no distension.      Palpations: Abdomen is soft.      Tenderness: There is no abdominal tenderness. There is no guarding or rebound.   Musculoskeletal:      Right lower leg: No edema.      Left lower leg: No edema.   Skin:     General: Skin is warm.      Capillary Refill: Capillary refill takes less than 2 seconds.      Coloration: Skin is not jaundiced.      Findings: No rash.   Neurological:      General: No focal deficit present.      Mental Status: She is alert and oriented to person, place, and time.      Sensory: No sensory deficit.      Motor: No weakness.      Comments: Mild slurring of speech during exam and mild psychomotor slowing   Psychiatric:         Mood and Affect: Mood normal.         Behavior: Behavior normal.          Additional Data:     Lab Results:  Results from last 7 days   Lab Units 01/11/24  0828   WBC Thousand/uL 7.54   HEMOGLOBIN g/dL 13.4   HEMATOCRIT % 39.4   PLATELETS Thousands/uL 282   NEUTROS PCT % 78*   LYMPHS PCT % 12*   MONOS PCT % 7   EOS PCT % 2     Results from last 7 days   Lab Units 01/11/24  0828   SODIUM mmol/L 124*   POTASSIUM mmol/L 3.9   CHLORIDE mmol/L 87*   CO2 mmol/L 30   BUN mg/dL 11   CREATININE mg/dL 0.60   ANION GAP mmol/L 7   CALCIUM mg/dL 10.1   ALBUMIN g/dL 4.2   TOTAL BILIRUBIN mg/dL 0.45   ALK PHOS U/L 72   ALT U/L 17   AST U/L 34   GLUCOSE RANDOM mg/dL 110                       Lines/Drains:  Invasive Devices       Peripheral Intravenous Line  Duration             Peripheral IV 01/11/24 Dorsal (posterior);Left Hand <1 day    Peripheral IV 01/11/24 Left Antecubital <1 day                        Imaging: Reviewed radiology  reports from this admission including: CT head  CT head wo contrast   Final Result by David Montero MD (01/11 1441)      No acute intracranial hemorrhage, mass effect or edema.  Chronic microangiopathic changes.                  Workstation performed: [id number]             EKG and Other Studies Reviewed on Admission:   EKG:  QTC WNL.    ** Please Note: This note has been constructed using a voice recognition system. **

## 2024-01-11 NOTE — ED PROVIDER NOTES
History  Chief Complaint   Patient presents with    Dizziness     Reports had concussion in July, feels dizzier than normal. Also reports was visiting mother at [facility] and believes she was exposed to covid. Reports low grade fevers.     Patient presents because of dizziness.  She states she had a concussion in July and is now feeling dizzy or that her normal state.  Patient also feels she had an exposure to COVID as she had recently visited her mother at [facility].  She does report having low-grade fevers.    Patient's past medical history includes anemia, anxiety, bulimia, rheumatoid arthritis, hypothyroidism, hyperlipidemia, hypertension and vertigo. Patient's laboratory data reviewed for the past several months.  She appears to have chronic hyponatremia as well as leukopenia.    Patient reports she has postconcussive syndrome with dizziness as her symptoms.  Usually she has dizziness when standing or when bending forward and feels like she is off balance.  She reports a shelf fell on her head at [facility] and since then she has had difficulty.  She is feeling more lightheaded in the past few days.    Patient went to visit her mother at [facility], her mother's roommate was diagnosed with COVID on Friday.  On Sunday patient started developing symptoms of congestion and low-grade fever as well as headaches and overall just not feeling well.  Patient did receive her flu shot.  She called her PCP on Saturday who recommended she present to the emergency department.  Patient did not come to the emergency department until she recalled her PCP today who again suggested she present to the emergency department.    Patient also reports she has been having decreased p.o. intake.  She denies taking over-the-counter medication for her cold symptoms.    Patient admits to low-grade fevers of approximately 99 degrees.  She denies having nausea, vomiting, diarrhea, abdominal pain, chest pain or shortness of breath.  She  also denies having any new rashes.  She denies having any dysuria.  She denies having cough but does admit to congestion.          Prior to Admission Medications   Prescriptions Last Dose Informant Patient Reported? Taking?   [facility] Vitamin B-12 1000 MCG tablet   No No   Sig: TAKE 1 TABLET BY MOUTH EVERY DAY   LORazepam (ATIVAN) 2 mg tablet   No No   Sig: Take 1 tablet (2 mg total) by mouth every 6 (six) hours as needed for anxiety   PARoxetine (PAXIL) 30 mg tablet  Self No No   Sig: Take 2 tablets (60 mg total) by mouth in the morning   QUEtiapine (SEROquel XR) 400 mg 24 hr tablet  Self No No   Sig: Take 1 tablet (400 mg total) by mouth daily at bedtime   amLODIPine (NORVASC) 5 mg tablet  Self No No   Sig: TAKE 1 TABLET (5 MG TOTAL) BY MOUTH DAILY.   benzonatate (TESSALON PERLES) 100 mg capsule  Self No No   Sig: Take 1 capsule (100 mg total) by mouth 3 (three) times a day as needed for cough   celecoxib (CeleBREX) 200 mg capsule   No No   Sig: Take 1 capsule (200 mg total) by mouth daily   ferrous sulfate 325 (65 Fe) mg tablet  Self No No   Sig: Take 1 tablet (325 mg total) by mouth 2 (two) times a day with meals   folic acid (FOLVITE) 400 mcg tablet  Self No No   Sig: Take 1 tablet (400 mcg total) by mouth daily   levothyroxine 25 mcg tablet  Self No No   Sig: TAKE 1 TABLET BY MOUTH EVERY DAY   memantine (NAMENDA) 10 mg tablet   No No   Sig: TAKE 1 TABLET (10 MG TOTAL) BY MOUTH 2 (TWO) TIMES A DAY START AFTER TAPERING UP WITH 5MG TWICE DAILY FOR 2 WEEKS   Patient not taking: Reported on 11/28/2023   memantine (NAMENDA) 5 mg tablet   No No   Sig: Take 1 tablet (5 mg total) by mouth 2 (two) times a day for 14 days   pantoprazole (PROTONIX) 40 mg tablet   No No   Sig: TAKE 1 TABLET BY MOUTH TWICE A DAY   predniSONE 20 mg tablet   Yes No   Sig: Take by mouth daily   Patient not taking: Reported on 12/4/2023   sucralfate (CARAFATE) 1 g tablet  Self No No   Sig: TAKE 1 TABLET (1 G TOTAL) BY MOUTH 3 (THREE) TIMES A DAY  WITH MEALS   triamcinolone (KENALOG) 0.1 % cream  Self No No   Sig: APPLY TOPICALLY 2 TIMES A DAY AS NEEDED FOR RASH.   ziprasidone (GEODON) 80 mg capsule  Self No No   Sig: TAKE 1 CAPSULE BY MOUTH EVERY DAY      Facility-Administered Medications: None       Past Medical History:   Diagnosis Date    Anemia     Anxiety     Arthritis     Back pain at L4-L5 level     Schreiber esophagus     Bulimia     Colon polyp     Disease of thyroid gland     hypothyroid    GERD (gastroesophageal reflux disease)     Hearing loss in left ear     History of transfusion     Hyperlipidemia     Hypertension     Hypothyroidism     Leukopenia     NMS (neuroleptic malignant syndrome) 01/03/2020    Pneumonia     RA (rheumatoid arthritis) (HCC)     in feet    Sciatica     Seizure (HCC)     due to medication mix up 8/2018 only one historically    Skin spots, red     lower right arm - poss from cat- no s/s infection    Synovial cyst of lumbar spine     Vertigo     Wears dentures     full set    Weight gain        Past Surgical History:   Procedure Laterality Date    BONE MARROW BIOPSY      BREAST SURGERY      enlargement with implants    CLOSED REDUCTION DISTAL FEMUR FRACTURE      COLONOSCOPY      COSMETIC SURGERY      DENTAL SURGERY      HIP ARTHROPLASTY Right 01/02/2020    Procedure: REVISION TOTAL HIP ARTHROPLASTY WITH REPAIR OF PARIPROSTHETIC FRACTURE - POSTERIOR APPROACH;  Surgeon: Dilan Pedersen MD;  Location: [facility];  Service: Orthopedics    IL AMPUTATION METATARSAL W/TOE SINGLE Left 04/07/2023    Procedure: AMPUTATION TOE 4th;  Surgeon: Conner Bartlett DPM;  Location:  MAIN OR;  Service: Podiatry    IL ARTHRP ACETBLR/PROX FEM PROSTC AGRFT/ALGRFT Right 12/11/2019    Procedure: ARTHROPLASTY HIP TOTAL ANTERIOR;  Surgeon: Dilan Pedersen MD;  Location:  MAIN OR;  Service: Orthopedics    IL ESOPHAGOGASTRODUODENOSCOPY TRANSORAL DIAGNOSTIC N/A 05/11/2021    Procedure: ESOPHAGOGASTRODUODENOSCOPY (EGD);  Surgeon: David Cedeño MD;   Location: [facility];  Service: General    AR LAPS RPR PARAESPHGL HRNA INCL FUNDPLSTY W/MESH N/A 05/11/2021    Procedure: REPAIR HERNIA PARAESOPHAGEAL  LAPAROSCOPIC;  Surgeon: David Cedeño MD;  [facility];  Service: General    AR UNLISTED PROCEDURE ESOPHAGUS N/A 05/11/2021    Procedure: FUNDOPLICATION TRANSORAL INCISIONLESS;  Surgeon: Brad Cadet MD;  Location: [facility];  Service: Gastroenterology    RHINOPLASTY      SINUS SURGERY      TOE AMPUTATION Left     2023 4th toe    TRANSORAL INCISIONLESS FUNDOPLICATION (TIF)  05/11/2021       Family History   Problem Relation Age of Onset    Uterine cancer Mother     Esophageal cancer Father     Colon cancer Maternal Grandmother      I have reviewed and agree with the history as documented.    E-Cigarette/Vaping    E-Cigarette Use Never User      E-Cigarette/Vaping Substances    Nicotine No     THC No     CBD No     Flavoring No     Other No     Unknown No      Social History     Tobacco Use    Smoking status: Never    Smokeless tobacco: Never   Vaping Use    Vaping status: Never Used   Substance Use Topics    Alcohol use: Not Currently    Drug use: Not Currently       Review of Systems   Constitutional:  Positive for appetite change. Negative for chills, fatigue and fever.   HENT:  Positive for congestion. Negative for sinus pressure and sore throat.    Eyes:  Negative for visual disturbance.   Respiratory:  Negative for cough and shortness of breath.    Cardiovascular:  Negative for chest pain, palpitations and leg swelling.   Gastrointestinal:  Negative for abdominal pain, constipation, diarrhea, nausea and vomiting.   Genitourinary:  Negative for dysuria.   Skin:  Negative for rash.   Neurological:  Positive for dizziness, light-headedness and headaches.   Psychiatric/Behavioral:  Negative for agitation and confusion.    All other systems reviewed and are negative.      Physical Exam  Physical Exam  Vitals and nursing note reviewed.   Constitutional:        General: She is not in acute distress.  HENT:      Head: Normocephalic and atraumatic.      Nose: Congestion present.      Mouth/Throat:      Mouth: Mucous membranes are moist.      Pharynx: No oropharyngeal exudate or posterior oropharyngeal erythema.   Eyes:      General:         Right eye: No discharge.         Left eye: No discharge.      Extraocular Movements: Extraocular movements intact.      Conjunctiva/sclera: Conjunctivae normal.      Pupils: Pupils are equal, round, and reactive to light.   Cardiovascular:      Rate and Rhythm: Normal rate and regular rhythm.      Heart sounds: Normal heart sounds. No murmur heard.  Pulmonary:      Effort: Pulmonary effort is normal. No respiratory distress.      Breath sounds: Normal breath sounds. No rales.   Abdominal:      General: Bowel sounds are normal.      Palpations: Abdomen is soft.      Tenderness: There is no guarding or rebound.   Musculoskeletal:         General: No deformity or signs of injury.      Cervical back: Normal range of motion and neck supple.   Skin:     General: Skin is warm and dry.      Capillary Refill: Capillary refill takes less than 2 seconds.      Findings: No erythema or rash.   Neurological:      Mental Status: She is alert and oriented to person, place, and time.      Cranial Nerves: No cranial nerve deficit.   Psychiatric:         Mood and Affect: Mood normal.         Vital Signs  ED Triage Vitals [01/11/24 0811]   Temperature Pulse Respirations Blood Pressure SpO2   99.3 °F (37.4 °C) 92 18 162/89 96 %      Temp Source Heart Rate Source Patient Position - Orthostatic VS BP Location FiO2 (%)   Oral Monitor -- Right arm --      Pain Score       5           Vitals:    01/11/24 0811   BP: 162/89   Pulse: 92         Visual Acuity      ED Medications  Medications - No data to display    Diagnostic Studies  Results Reviewed       Procedure Component Value Units Date/Time    CBC and differential [[id number]]     Lab Status: No result  "Specimen: Blood     Comprehensive metabolic panel [[id number]]     Lab Status: No result Specimen: Blood     HS Troponin 0hr (reflex protocol) [[id number]]     Lab Status: No result Specimen: Blood                    No orders to display              Procedures  Procedures         ED Course  ED Course as of 01/11/24 1343   Thu Jan 11, 2024   1150 Patient's laboratory data reviewed with a sodium of 124 and a chloride of 87.  Patient also has a metabolic alkalosis likely this is related to decreased p.o. intake and intravascular volume depletion/dehydration.  Continue with IV fluids.   1308 Can follow-up with patient patient stated that she was having difficulty expressing herself and has not been able to drive to the store secondary to weakness.  She stated that she was having difficulty in \"articulating her words\" on Sunday.  Patient did not have evaluation for this.  Plan to obtain CT scan.  Will continue with IV fluids and initiate for dehydration as well as possible TIA.   1334 Patient was being transported for CT scan.  Patient had initially refused CT scan stating that she did not wanted.  After discussion with patient and reviewing that she recently had difficulty with speech as well as reported having dizziness it would be in her best interest to obtain a CT scan to evaluate if a stroke event had happened.  Patient then stated she did not have difficulty with speech as that happened in July.  Upon further discussion and clarification patient stated that her concussion happened in July and she did state that she was having difficulty with her speech and \"articulating\" her words on Sunday.  She is agreeable to obtain CT scan.  Patient appears to have difficulty with recall and time of events.  She reported that she visits her mother regularly at [facility] but her mother got \"hernandez-winked \"and now the patient is no longer in control of her mother's medical decision making.  Patient also states that she had " difficulty getting to the store and has not been eating or drinking in the past week.  She also states that she did not get her medications refilled and has not been taking them.   1342 Discussed with [facility] plan to admit for observation.                                             Medical Decision Making  Patient complains of dizziness.  She has had dizziness since July now having worsening symptoms.    Patient likely has an acute exacerbation of her underlying post concussion syndrome with worsening dizziness secondary to dehydration and viral illness.  Plan to administer IV fluids.  Check baseline laboratory data.  Check COVID test.  Unfortunately patient presenting so late after her symptom presentation supportive interventions only would be necessary.  Patient has a low-grade fever here in the emergency department.  She is maintaining appropriate oxygen saturations.    Amount and/or Complexity of Data Reviewed  Labs: ordered.  Radiology: ordered.    Risk  Decision regarding hospitalization.             Disposition  Final diagnoses:   None     ED Disposition       None          Follow-up Information    None         Patient's Medications   Discharge Prescriptions    No medications on file       No discharge procedures on file.    PDMP Review         Value Time User    PDMP Reviewed  Yes 12/22/2023 10:57 AM Raheem Cain MD            ED Provider  Electronically Signed by             Sri Patel DO  01/11/24 8095

## 2024-01-11 NOTE — ASSESSMENT & PLAN NOTE
Patient with chronic hyponatremia likely related to her multiple psych medications.  Comes in with sodium of 122  She is hypochloremic and endorses poor p.o. intake, suspect she may be a little prerenal.  She was given 1 L of LR in the ED  Will gently hydrate with normal saline and check BMP every 8 hour.  Because patient already received IV fluid, I am unsure the utility of urine studies at this time  Presently I do not suspect that patient's current sodium level is contributing to her current medical issues

## 2024-01-11 NOTE — ASSESSMENT & PLAN NOTE
Patient endorsing chronic dizziness which she believes is related to postconcussive syndrome after a clothes display fell on her head at [employer] in July 2023  She has followed up with neurology since then.  Feels like her symptoms presently are a little worse than usual.  No focal neurologic deficits.  CT head ordered and will follow-up results.  PT/OT ordered, patient states she sees outpatient PT and OT for vestibular training  Meclizine as needed.  Attempt conservative measures and if symptoms unrelieved or neurologic exam changing, consider inpatient MRI

## 2024-01-11 NOTE — ASSESSMENT & PLAN NOTE
"Patient on significant regimen including Geodon 80 daily, Seroquel 400 nightly, lorazepam 2 mg p.o. every 6 hours, Paxil 60 mg daily  Reviewed EKG with normal QTc interval  She tells me that she has not seen her psychiatrist in 3 years because he moved to another state, but she is reluctant to consider medication changes because \"these are the only things that work\"  I do not know necessarily that meds need to be adjusted while in hospital, but it is possible that medication side effect could be contributing to some of her dizziness and possible speech issues.  Recommend she establish with a psychiatrist after discharge.  If growing concern while patient is in hospital, consider psych consult  "

## 2024-01-11 NOTE — ASSESSMENT & PLAN NOTE
Patient endorsing some slurring of her speech since about Sunday.  She notes that she has been feeling under the weather for the past several days.  Her mother is in a facility and there is a COVID outbreak there. Patient worried about possibly having COVID infection.  Neuro exam nonfocal but patient does have seldom slurring of speech during our conversation.  She has some psychomotor slowing and I am unsure how chronic this is.  She was ambulating around the room and otherwise seems functional  Obs, telemetry, awaiting CT head results.  If patient remains stable overnight would consider DC home if cleared by PT.  If changing neurologic exam or increased level of concern, would likely consider inpatient MRI and neuro consult if positive

## 2024-01-12 PROBLEM — R47.9 DIFFICULTY WITH SPEECH: Status: RESOLVED | Noted: 2024-01-11 | Resolved: 2024-01-12

## 2024-01-12 PROBLEM — R42 DIZZINESS: Status: RESOLVED | Noted: 2020-03-30 | Resolved: 2024-01-12

## 2024-01-12 LAB
ANION GAP SERPL CALCULATED.3IONS-SCNC: 5 MMOL/L
ANION GAP SERPL CALCULATED.3IONS-SCNC: 6 MMOL/L
ANION GAP SERPL CALCULATED.3IONS-SCNC: 7 MMOL/L
ATRIAL RATE: 90 BPM
BUN SERPL-MCNC: 10 MG/DL (ref 5–25)
CALCIUM SERPL-MCNC: 8.6 MG/DL (ref 8.4–10.2)
CALCIUM SERPL-MCNC: 8.8 MG/DL (ref 8.4–10.2)
CALCIUM SERPL-MCNC: 9.4 MG/DL (ref 8.4–10.2)
CHLORIDE SERPL-SCNC: 100 MMOL/L (ref 96–108)
CHLORIDE SERPL-SCNC: 96 MMOL/L (ref 96–108)
CHLORIDE SERPL-SCNC: 98 MMOL/L (ref 96–108)
CO2 SERPL-SCNC: 29 MMOL/L (ref 21–32)
CO2 SERPL-SCNC: 29 MMOL/L (ref 21–32)
CO2 SERPL-SCNC: 31 MMOL/L (ref 21–32)
CREAT SERPL-MCNC: 0.55 MG/DL (ref 0.6–1.3)
CREAT SERPL-MCNC: 0.58 MG/DL (ref 0.6–1.3)
CREAT SERPL-MCNC: 0.64 MG/DL (ref 0.6–1.3)
GFR SERPL CREATININE-BSD FRML MDRD: 100 ML/MIN/1.73SQ M
GFR SERPL CREATININE-BSD FRML MDRD: 95 ML/MIN/1.73SQ M
GFR SERPL CREATININE-BSD FRML MDRD: 98 ML/MIN/1.73SQ M
GLUCOSE P FAST SERPL-MCNC: 92 MG/DL (ref 65–99)
GLUCOSE P FAST SERPL-MCNC: 92 MG/DL (ref 65–99)
GLUCOSE SERPL-MCNC: 92 MG/DL (ref 65–140)
GLUCOSE SERPL-MCNC: 92 MG/DL (ref 65–140)
GLUCOSE SERPL-MCNC: 97 MG/DL (ref 65–140)
P AXIS: 112 DEGREES
POTASSIUM SERPL-SCNC: 3.5 MMOL/L (ref 3.5–5.3)
POTASSIUM SERPL-SCNC: 3.6 MMOL/L (ref 3.5–5.3)
POTASSIUM SERPL-SCNC: 3.8 MMOL/L (ref 3.5–5.3)
PR INTERVAL: 134 MS
QRS AXIS: 188 DEGREES
QRSD INTERVAL: 76 MS
QT INTERVAL: 350 MS
QTC INTERVAL: 428 MS
SODIUM SERPL-SCNC: 130 MMOL/L (ref 135–147)
SODIUM SERPL-SCNC: 134 MMOL/L (ref 135–147)
SODIUM SERPL-SCNC: 137 MMOL/L (ref 135–147)
T WAVE AXIS: 131 DEGREES
VENTRICULAR RATE: 90 BPM

## 2024-01-12 PROCEDURE — 99232 SBSQ HOSP IP/OBS MODERATE 35: CPT | Performed by: STUDENT IN AN ORGANIZED HEALTH CARE EDUCATION/TRAINING PROGRAM

## 2024-01-12 PROCEDURE — 80048 BASIC METABOLIC PNL TOTAL CA: CPT

## 2024-01-12 PROCEDURE — NC001 PR NO CHARGE: Performed by: INTERNAL MEDICINE

## 2024-01-12 PROCEDURE — 99223 1ST HOSP IP/OBS HIGH 75: CPT | Performed by: INTERNAL MEDICINE

## 2024-01-12 PROCEDURE — 80048 BASIC METABOLIC PNL TOTAL CA: CPT | Performed by: NURSE PRACTITIONER

## 2024-01-12 PROCEDURE — 97162 PT EVAL MOD COMPLEX 30 MIN: CPT

## 2024-01-12 PROCEDURE — 80048 BASIC METABOLIC PNL TOTAL CA: CPT | Performed by: STUDENT IN AN ORGANIZED HEALTH CARE EDUCATION/TRAINING PROGRAM

## 2024-01-12 PROCEDURE — 97166 OT EVAL MOD COMPLEX 45 MIN: CPT

## 2024-01-12 RX ORDER — DEXTROSE MONOHYDRATE 50 MG/ML
200 INJECTION, SOLUTION INTRAVENOUS CONTINUOUS
Status: DISCONTINUED | OUTPATIENT
Start: 2024-01-12 | End: 2024-01-12

## 2024-01-12 RX ORDER — DEXTROSE MONOHYDRATE 50 MG/ML
150 INJECTION, SOLUTION INTRAVENOUS CONTINUOUS
Status: DISCONTINUED | OUTPATIENT
Start: 2024-01-12 | End: 2024-01-12

## 2024-01-12 RX ORDER — DESMOPRESSIN ACETATE 4 UG/ML
2 INJECTION, SOLUTION INTRAVENOUS; SUBCUTANEOUS ONCE
Status: COMPLETED | OUTPATIENT
Start: 2024-01-12 | End: 2024-01-12

## 2024-01-12 RX ADMIN — ZIPRASIDONE HYDROCHLORIDE 80 MG: 40 CAPSULE ORAL at 09:36

## 2024-01-12 RX ADMIN — AMLODIPINE BESYLATE 5 MG: 5 TABLET ORAL at 09:34

## 2024-01-12 RX ADMIN — DESMOPRESSIN ACETATE 2 MCG: 4 INJECTION INTRAVENOUS; SUBCUTANEOUS at 03:05

## 2024-01-12 RX ADMIN — HEPARIN SODIUM 5000 UNITS: 5000 INJECTION INTRAVENOUS; SUBCUTANEOUS at 22:43

## 2024-01-12 RX ADMIN — HEPARIN SODIUM 5000 UNITS: 5000 INJECTION INTRAVENOUS; SUBCUTANEOUS at 06:18

## 2024-01-12 RX ADMIN — SUCRALFATE 1 G: 1 TABLET ORAL at 11:46

## 2024-01-12 RX ADMIN — LEVOTHYROXINE SODIUM 25 MCG: 25 TABLET ORAL at 06:18

## 2024-01-12 RX ADMIN — PANTOPRAZOLE SODIUM 40 MG: 40 TABLET, DELAYED RELEASE ORAL at 06:18

## 2024-01-12 RX ADMIN — QUETIAPINE FUMARATE 400 MG: 200 TABLET, EXTENDED RELEASE ORAL at 22:43

## 2024-01-12 RX ADMIN — SUCRALFATE 1 G: 1 TABLET ORAL at 06:21

## 2024-01-12 RX ADMIN — CELECOXIB 200 MG: 200 CAPSULE ORAL at 09:34

## 2024-01-12 RX ADMIN — DEXTROSE 100 ML/HR: 5 SOLUTION INTRAVENOUS at 09:33

## 2024-01-12 RX ADMIN — PAROXETINE HYDROCHLORIDE 60 MG: 20 TABLET, FILM COATED ORAL at 09:33

## 2024-01-12 RX ADMIN — FOLIC ACID TAB 400 MCG 400 MCG: 400 TAB at 09:33

## 2024-01-12 NOTE — CONSULTS
Consultation - Nephrology   Irene Plascencia 63 y.o. female MRN: [id number]  Unit/Bed#: CW2 216-02 Encounter: [id number]    ASSESSMENT and PLAN:  Acute on chronic hyponatremia  Etiology: Suspect factorial in the setting of SSRI Celexa and Paxil, NSAID use with Celebrex, increased ADH production with ongoing headaches with post concussion disorder along with crease solute intake and polydipsia schizophrenic disorder.  Also need to consider adrenal insufficiency.   Baseline sodium mainly 130-1 35 with intermittent normal sodium periodically.  Can be as low as 119 dating back to 2012-not on treatment as outpatient  Admission sodium 124 on 1/11/2024  Had rapid rise 10 points to 134 within 8 hours and 13 points in 17 hours at 137  Status post DDAVP and D5 water overnight  Sodium 134 this a.m. at 06 23  D5 water at 150 cc an hour was ordered however, was not started till 933  Currently patient is refusing further blood work for today for she feels she is a hard stick and had too many blood work overnight  Upon evaluation, I discussed with patient importance of blood work and evaluation of sodium frequently to prevent fluid shifts. However, patient continues to refuse at this time  May need to consider fluid restriction and stop IV fluids with refusal of further Blood work-will discuss with   Recommend checking BMP and a.m. cortisol level am  Need strict I/O given DDAVP  Urine sodium and urine osmolality-ordered and needs to be collected    Blood pressure/hypertension:  Current BP acceptable 120s to 130s  Home medications include: Amlodipine 5 mg daily,  Current medications include: Amlodipine 5 mg daily  Maximize hemodynamics to maintain MAP >65  Avoid hypotension or fluctuations in blood pressure  Will continue to trend    Schizophrenic disorder  Outpatient regimen includes: Seroquel 400 mg nightly, Paxil 60 mg daily, getting an 80 mg daily, and lorazepam 2 mg every 6 hours  Primary team patient reported  that she has not seen her psychiatrist in 3 years  Consider neuropsych eval while admitted    Concussion with syndrome  Patient reporting chronic, dizziness and blurred vision which is not new  On Celebrex 200 mg daily  Management per primary team    Overall plan and recommendations: Discussed with primary team who is in agreement the plan as below  Strict intake and output given DDAVP given earlier  IV D5 water increased to 150 cc/h for now  Checking BMP at 12 noon if patient allows  Avoid ongoing rapid correction of sodium to prevent osmotic demyelination  Encourage blood draws at each encounter with patient  Check BMP and cortisol level in a.m. along with phosphorus and magnesium  Obtain urine sodium and urine osmolality      Medical records through [facility] Everywhere has been reviewed for this patient encounter    HISTORY OF PRESENT ILLNESS:  Requesting Physician: Hermelinda Eason MD  Reason for Consult: acute on chronic hyponatremia    Irene Plascencia is a 63 y.o. female who has PMH of hyponatremia, hypertension, hypothyroidism, concussive syndrome since July 2023, schizoaffective disorder who presented to the ER with complaints of generalized weakness, slurred speech, dizziness, and decreased appetite.  Patient reported her mom who is at a facility has COVID and she was concerned that she might have it to.  Workup revealed hyponatremia and a renal consultation is requested today for further evaluation and treatment options.  Patient had rapid correction with IV fluids treated with DDAVP and D5 water from on-call nephrologist overnight.  Repeat BMP this a.m. still somewhat elevated at 134 and D5 water at 100 cc an hour was restarted.  After review of medical records it appears patient has low cortisol level is 2019.  Unaware of any issues with her adrenals and does not see endocrinology.  Recently on prednisone 20 mg daily but unsure why and when she actually stopped.  Patient reports her symptoms of  dizziness headaches and blurred vision have been present since her concussion.  She still reports being weak feeling well.      PAST MEDICAL HISTORY:  Past Medical History:   Diagnosis Date    Anemia     Anxiety     Arthritis     Back pain at L4-L5 level     Schreiber esophagus     Bulimia     Colon polyp     Disease of thyroid gland     hypothyroid    GERD (gastroesophageal reflux disease)     Hearing loss in left ear     History of transfusion     Hyperlipidemia     Hypertension     Hypothyroidism     Leukopenia     NMS (neuroleptic malignant syndrome) 01/03/2020    Pneumonia     RA (rheumatoid arthritis) (HCC)     in feet    Sciatica     Seizure (HCC)     due to medication mix up 8/2018 only one historically    Skin spots, red     lower right arm - poss from cat- no s/s infection    Synovial cyst of lumbar spine     Vertigo     Wears dentures     full set    Weight gain        PAST SURGICAL HISTORY:  Past Surgical History:   Procedure Laterality Date    BONE MARROW BIOPSY      BREAST SURGERY      enlargement with implants    CLOSED REDUCTION DISTAL FEMUR FRACTURE      COLONOSCOPY      COSMETIC SURGERY      DENTAL SURGERY      HIP ARTHROPLASTY Right 01/02/2020    Procedure: REVISION TOTAL HIP ARTHROPLASTY WITH REPAIR OF PARIPROSTHETIC FRACTURE - POSTERIOR APPROACH;  Surgeon: Dilan Pedersen MD;  [facility];  Service: Orthopedics    VT AMPUTATION METATARSAL W/TOE SINGLE Left 04/07/2023    Procedure: AMPUTATION TOE 4th;  Surgeon: Conner Bartlett DPM;  Location:  MAIN OR;  Service: Podiatry    VT ARTHRP ACETBLR/PROX FEM PROSTC AGRFT/ALGRFT Right 12/11/2019    Procedure: ARTHROPLASTY HIP TOTAL ANTERIOR;  Surgeon: Dilan Pedersen MD;  Location:  MAIN OR;  Service: Orthopedics    VT ESOPHAGOGASTRODUODENOSCOPY TRANSORAL DIAGNOSTIC N/A 05/11/2021    Procedure: ESOPHAGOGASTRODUODENOSCOPY (EGD);  Surgeon: David Cedeño MD;  Location:  MAIN OR;  Service: General    VT LAPS RPR PARAESPHGL HRNA INCL FUNDPLSTY  W/MESH N/A 05/11/2021    Procedure: REPAIR HERNIA PARAESOPHAGEAL  LAPAROSCOPIC;  Surgeon: David Cedeño MD;  [facility];  Service: General    MT UNLISTED PROCEDURE ESOPHAGUS N/A 05/11/2021    Procedure: FUNDOPLICATION TRANSORAL INCISIONLESS;  Surgeon: Brad Cadet MD;  [facility];  Service: Gastroenterology    RHINOPLASTY      SINUS SURGERY      TOE AMPUTATION Left     2023 4th toe    TRANSORAL INCISIONLESS FUNDOPLICATION (TIF)  05/11/2021       ALLERGIES:  Allergies   Allergen Reactions    Risperdal [Risperidone] Shortness Of Breath     Rapid heart beat, SOB    Zyprexa [Olanzapine] Shortness Of Breath     Rapid heartbeat    Latex Rash     Band aids, adhesives wears normal underwear, can eat bananas  USE PAPER TAPE       SOCIAL HISTORY:  Social History     Substance and Sexual Activity   Alcohol Use Not Currently     Social History     Substance and Sexual Activity   Drug Use Not Currently     Social History     Tobacco Use   Smoking Status Never   Smokeless Tobacco Never       FAMILY HISTORY:  Family History   Problem Relation Age of Onset    Uterine cancer Mother     Esophageal cancer Father     Colon cancer Maternal Grandmother        MEDICATIONS:    Current Facility-Administered Medications:     amLODIPine (NORVASC) tablet 5 mg, 5 mg, Oral, Daily, Raheem Martinez, 5 mg at 01/12/24 0934    celecoxib (CeleBREX) capsule 200 mg, 200 mg, Oral, Daily, Raheem Martinez, 200 mg at 01/12/24 0934    dextrose 5 % infusion, 100 mL/hr, Intravenous, Continuous, Chapo Patel MD, Last Rate: 100 mL/hr at 01/12/24 0933, 100 mL/hr at 01/12/24 0933    folic acid (FOLVITE) tablet 400 mcg, 400 mcg, Oral, Daily, Raheem Martinez, 400 mcg at 01/12/24 0933    heparin (porcine) subcutaneous injection 5,000 Units, 5,000 Units, Subcutaneous, Q8H YUMIKO, Raheem Martinez, 5,000 Units at 01/12/24 0618    levothyroxine tablet 25 mcg, 25 mcg, Oral, Early Morning, Raheem Martinez, 25 mcg at 01/12/24 0618     "LORazepam (ATIVAN) tablet 2 mg, 2 mg, Oral, Q6H PRN, Raheem Martinez    pantoprazole (PROTONIX) EC tablet 40 mg, 40 mg, Oral, BID AC, Raheem Martinez, 40 mg at 01/12/24 0618    PARoxetine (PAXIL) tablet 60 mg, 60 mg, Oral, Daily, Raheem Martinez, 60 mg at 01/12/24 0933    QUEtiapine (SEROquel XR) 24 hr tablet 400 mg, 400 mg, Oral, HS, Raheem Martinez, 400 mg at 01/11/24 2240    sucralfate (CARAFATE) tablet 1 g, 1 g, Oral, TID With Meals, Raheem Martinez, 1 g at 01/12/24 0621    ziprasidone (GEODON) capsule 80 mg, 80 mg, Oral, Daily, Raheem Martinez, 80 mg at 01/12/24 0936      REVIEW OF SYSTEMS:  Patient seen and examined at bedside. Pt reports has post concussion symptoms after a fall this past July  Review of Systems   Constitutional:  Positive for fever. Negative for activity change, appetite change, chills, diaphoresis and fatigue.        Reported low grade temp at home at 99.9 for a week   HENT: Negative.  Negative for congestion and facial swelling.    Eyes: Negative.    Respiratory: Negative.     Cardiovascular: Negative.    Gastrointestinal: Negative.    Endocrine: Positive for polydipsia.        Reports drink \"a lot\"   Genitourinary: Negative.    Musculoskeletal: Negative.    Skin: Negative.    Allergic/Immunologic: Negative.    Neurological:  Positive for dizziness and headaches.        Blurred vision   Hematological: Negative.    Psychiatric/Behavioral: Negative.           PHYSICAL EXAM:  Current weight: Weight - Scale: 85.7 kg (189 lb)  Vitals:    01/11/24 1842 01/11/24 2259 01/12/24 0646 01/12/24 0724   BP: 130/81 128/83 129/80 124/76   BP Location:    Right arm   Pulse:  75 80 75   Resp:    16   Temp: 98.8 °F (37.1 °C)  97.9 °F (36.6 °C) 98.1 °F (36.7 °C)   TempSrc:    Oral   SpO2:  94% 94% 93%   Weight:       Height:           Physical Exam  Vitals and nursing note reviewed.   Constitutional:       Appearance: Normal appearance.      Comments: NAD   HENT:      Head: Normocephalic and " "atraumatic.      Nose: Nose normal.      Mouth/Throat:      Mouth: Mucous membranes are moist.      Pharynx: Oropharynx is clear.   Eyes:      Extraocular Movements: Extraocular movements intact.      Conjunctiva/sclera: Conjunctivae normal.   Cardiovascular:      Rate and Rhythm: Normal rate and regular rhythm.      Pulses: Normal pulses.      Heart sounds: Normal heart sounds.   Pulmonary:      Effort: Pulmonary effort is normal.      Breath sounds: Normal breath sounds.   Abdominal:      General: Bowel sounds are normal.      Palpations: Abdomen is soft.   Musculoskeletal:         General: Normal range of motion.      Cervical back: Normal range of motion and neck supple.   Skin:     General: Skin is warm and dry.   Neurological:      General: No focal deficit present.      Mental Status: She is alert and oriented to person, place, and time.   Psychiatric:         Mood and Affect: Mood normal.         Behavior: Behavior normal.         Lab Results:   Results from last 7 days   Lab Units 01/12/24  0623 01/12/24  0134 01/11/24  1602 01/11/24  0828   WBC Thousand/uL  --   --   --  7.54   HEMOGLOBIN g/dL  --   --   --  13.4   HEMATOCRIT %  --   --   --  39.4   PLATELETS Thousands/uL  --   --   --  282   SODIUM mmol/L 134* 137 134* 124*   POTASSIUM mmol/L 3.6 3.8 3.2* 3.9   CHLORIDE mmol/L 98 100 97 87*   CO2 mmol/L 29 31 31 30   BUN mg/dL 10 10 9 11   CREATININE mg/dL 0.58* 0.64 0.64 0.60   CALCIUM mg/dL 8.8 9.4 10.1 10.1   ALK PHOS U/L  --   --   --  72   ALT U/L  --   --   --  17   AST U/L  --   --   --  34         Portions of the record may have been created with voice recognition software. Occasional wrong word or \"sound a like\" substitutions may have occurred due to the inherent limitations of voice recognition software. Read the chart carefully and recognize,   "

## 2024-01-12 NOTE — PHYSICAL THERAPY NOTE
Physical Therapy Evaluation    Patient Name: Irene Plascencia    Today's Date: 1/12/2024     Problem List  Active Problems:    Acquired hypothyroidism    Schizoaffective disorder (HCC)    Primary hypertension    Hyponatremia       Past Medical History  Past Medical History:   Diagnosis Date    Anemia     Anxiety     Arthritis     Back pain at L4-L5 level     Schreiber esophagus     Bulimia     Colon polyp     Disease of thyroid gland     hypothyroid    GERD (gastroesophageal reflux disease)     Hearing loss in left ear     History of transfusion     Hyperlipidemia     Hypertension     Hypothyroidism     Leukopenia     NMS (neuroleptic malignant syndrome) 01/03/2020    Pneumonia     RA (rheumatoid arthritis) (HCC)     in feet    Sciatica     Seizure (HCC)     due to medication mix up 8/2018 only one historically    Skin spots, red     lower right arm - poss from cat- no s/s infection    Synovial cyst of lumbar spine     Vertigo     Wears dentures     full set    Weight gain         Past Surgical History  Past Surgical History:   Procedure Laterality Date    BONE MARROW BIOPSY      BREAST SURGERY      enlargement with implants    CLOSED REDUCTION DISTAL FEMUR FRACTURE      COLONOSCOPY      COSMETIC SURGERY      DENTAL SURGERY      HIP ARTHROPLASTY Right 01/02/2020    Procedure: REVISION TOTAL HIP ARTHROPLASTY WITH REPAIR OF PARIPROSTHETIC FRACTURE - POSTERIOR APPROACH;  Surgeon: Dilna Pedersen MD;  [facility];  Service: Orthopedics    MA AMPUTATION METATARSAL W/TOE SINGLE Left 04/07/2023    Procedure: AMPUTATION TOE 4th;  Surgeon: Conner Bartlett DPM;  Location:  MAIN OR;  Service: Podiatry    MA ARTHRP ACETBLR/PROX FEM PROSTC AGRFT/ALGRFT Right 12/11/2019    Procedure: ARTHROPLASTY HIP TOTAL ANTERIOR;  Surgeon: Dilan Pedersen MD;  Location:  MAIN OR;  Service: Orthopedics    MA ESOPHAGOGASTRODUODENOSCOPY TRANSORAL DIAGNOSTIC N/A 05/11/2021     Procedure: ESOPHAGOGASTRODUODENOSCOPY (EGD);  Surgeon: David Cedeño MD;  [facility];  Service: General    MN LAPS RPR PARAESPHGL HRNA INCL FUNDPLSTY W/MESH N/A 05/11/2021    Procedure: REPAIR HERNIA PARAESOPHAGEAL  LAPAROSCOPIC;  Surgeon: David Cedeño MD;  [facility];  Service: General    MN UNLISTED PROCEDURE ESOPHAGUS N/A 05/11/2021    Procedure: FUNDOPLICATION TRANSORAL INCISIONLESS;  Surgeon: Brad Cadet MD;  [facility];  Service: Gastroenterology    RHINOPLASTY      SINUS SURGERY      TOE AMPUTATION Left     2023 4th toe    TRANSORAL INCISIONLESS FUNDOPLICATION (TIF)  05/11/2021 01/12/24 0846   PT Last Visit   PT Visit Date 01/12/24   Note Type   Note type Evaluation   Pain Assessment   Pain Assessment Tool 0-10   Pain Score No Pain   Restrictions/Precautions   Weight Bearing Precautions Per Order No   Other Precautions Telemetry;Multiple lines;Cognitive   Home Living   Type of Home House   Home Layout One level  (2 FAMILIA)   Bathroom Shower/Tub Tub/shower unit   Bathroom Toilet Raised   Bathroom Equipment Grab bars in shower   Additional Comments no AD use PTA   Prior Function   Level of [place] Independent with ADLs;Independent with functional mobility;Independent with IADLS   Lives With Alone   IADLs Independent with driving;Independent with medication management;Independent with meal prep   Falls in the last 6 months 1 to 4  (3 trips)   Comments reports previously going to [facility] for vestibular therapy.   General   Additional Pertinent History reprots she gets dizzy with bending forward especially   Family/Caregiver Present No   Cognition   Overall Cognitive Status WFL   Arousal/Participation Cooperative   Attention Within functional limits   Orientation Level Oriented X4   Memory Within functional limits   Following Commands Follows all commands and directions without difficulty   RLE Assessment   RLE Assessment WFL   LLE Assessment   LLE Assessment WFL    Vestibular   Vestibular Comments intact smooth pursuits. impaired horizontal saccades with eye movements to R which improved with repetitions. impaired gaze stability vertical>horizontal with associated dizziness   Bed Mobility   Supine to Sit 6  Modified independent   Sit to Supine 6  Modified independent   Transfers   Sit to Stand 6  Modified independent   Stand to Sit 6  Modified independent   Stand pivot 6  Modified independent   Additional Comments no AD   Ambulation/Elevation   Gait pattern Decreased foot clearance   Gait Assistance 6  Modified independent   Assistive Device None   Distance 80'   Stair Management Assistance Not tested   Balance   Static Sitting Fair +   Dynamic Sitting Fair   Static Standing Fair -   Dynamic Standing Fair -   Ambulatory Fair -   Endurance Deficit   Endurance Deficit Yes   Activity Tolerance   Activity Tolerance Patient tolerated treatment well   Medical Staff Made Aware OT MARCO   Nurse Made Aware yes-cleared   Assessment   Prognosis Good   Assessment Pt is a 63 y.o. female [facility] with dizzier than normal (concussion in July with persistent dizziness). Pt has the following comorbidities which affect their treatment: active problems listed above,  has a past medical history of Anemia, Anxiety, Arthritis, Back pain at L4-L5 level, Schreiber esophagus, Bulimia, Colon polyp, Disease of thyroid gland, GERD (gastroesophageal reflux disease), Hearing loss in left ear, History of transfusion, Hyperlipidemia, Hypertension, Hypothyroidism, Leukopenia, NMS (neuroleptic malignant syndrome), Pneumonia, RA (rheumatoid arthritis) (HCC), Sciatica, Seizure (HCC), Skin spots, red, Synovial cyst of lumbar spine, Vertigo, Wears dentures, and Weight gain.  as well as personal factors including stairs to access home and living alone. Pt has a moderate complexity clinical presentation due to Ongoing medical management for primary dx, Ongoing telemetry monitoring, Cog status,  Diagnostic imaging pending, Continuous pulse oximetry monitoring  , and PMH. PT was consulted to evaluate pt's functional mobility and discharge needs. Upon evaluation, patient required mod I for all mobility as outlined above. Body system impairments include impaired gaze stability and +dizziness with looking up or bending down.  Pt's functional activity impairments include: impaired mobility. Participation restrictions include difficulty with house chores. At conclusion of eval, pt remained seated in bed with phone, call bell, and all other personal needs within reach. The patient's AM-PAC Basic Mobility Inpatient Short Form Raw Score is 23. A Raw score of greater than 16 suggests the patient may benefit from discharge to home. Please also refer to the recommendation of the Physical Therapist for safe discharge planning. At this time, pt has no further acute care PT needs 2* being mod I. Pt denies any concerns regarding their functional mobility or ability to return home safely at this time. Recommend pt continues to mobilize with nursing and restorative staff during hospital stay. Please consult with any changes in mobility status.   Barriers to Discharge None   Goals   Patient Goals to go home   Discharge Recommendation   Rehab Resource Intensity Level, PT III (Minimum Resource Intensity)  (focus on vestibular O[facility])   AM-PAC Basic Mobility Inpatient   Turning in Flat Bed Without Bedrails 4   Lying on Back to Sitting on Edge of Flat Bed Without Bedrails 4   Moving Bed to Chair 4   Standing Up From Chair Using Arms 4   Walk in Room 4   Climb 3-5 Stairs With Railing 3   Basic Mobility Inpatient Raw Score 23   Basic Mobility Standardized Score 50.88   Highest Level Of Mobility   JH-HLM Goal 7: Walk 25 feet or more   JH-HLM Achieved 7: Walk 25 feet or more   Modified Tiffany Scale   Modified Tiffany Scale 2   Barthel Index   Feeding 10   Bathing 5   Grooming Score 5   Dressing Score 10   Bladder Score 10   Bowels Score  10   Toilet Use Score 10   Transfers (Bed/Chair) Score 15   Mobility (Level Surface) Score 10   Stairs Score 10   Barthel Index Score 95   Alex Mays, PT, DPT

## 2024-01-12 NOTE — PROGRESS NOTES
"[facility]  Progress Note  Name: Irene Plascencia I  MRN: [id number]  Unit/Bed#: CW2 216-02 I Date of Admission: 1/11/2024   Date of Service: 1/12/2024 I Hospital Day: 0    Assessment/Plan   Hyponatremia  Assessment & Plan  Patient with chronic hyponatremia likely related to her multiple psych medications.  Comes in with sodium of 122  She is hypochloremic and endorses poor p.o. intake, suspect she may be a little prerenal.  She was given 1 L of LR in the ED  Correction to 134 -137 - 134  Nephrology on board managing  Patient has now refused blood work due-explained risks to patient with not getting current blood work nephrology made aware and is speaking with patient    Schizoaffective disorder (HCC)  Assessment & Plan  Patient on significant regimen including Geodon 80 daily, Seroquel 400 nightly, lorazepam 2 mg p.o. every 6 hours, Paxil 60 mg daily  Reviewed EKG with normal QTc interval  She tells me that she has not seen her psychiatrist in 3 years because he moved to another [place], but she is reluctant to consider medication changes because \"these are the only things that work\"  I do not know necessarily that meds need to be adjusted while in hospital, but it is possible that medication side effect could be contributing to some of her dizziness and possible speech issues.  Recommend she establish with a psychiatrist after discharge.  If growing concern while patient is in hospital, consider psych consult    Acquired hypothyroidism  Assessment & Plan  Resume levothyroxine 25 mcg daily, recent TSH in 9/9/2023 WNL    Dizziness-resolved as of 1/12/2024  Assessment & Plan  Patient endorsing chronic dizziness which she believes is related to postconcussive syndrome after a clothes display fell on her head [facility] July 20[facility]  She has followed up with neurology since then.  Feels like her symptoms presently are a little worse than usual.  No focal neurologic deficits.  " CT head ordered and will follow-up results.  PT/OT ordered, patient states she sees outpatient PT and OT for vestibular training  Meclizine as needed.  Attempt conservative measures and if symptoms unrelieved or neurologic exam changing, consider inpatient MRI           VTE Pharmacologic Prophylaxis:   Moderate Risk (Score 3-4) - Pharmacological DVT Prophylaxis Ordered: heparin.    Mobility:   Basic Mobility Inpatient Raw Score: 24  JH-HLM Goal: 8: Walk 250 feet or more  JH-HLM Achieved: 7: Walk 25 feet or more  HLM Goal NOT achieved. Continue with multidisciplinary rounding and encourage appropriate mobility to improve upon HLM goals.    Patient Centered Rounds: I performed bedside rounds with nursing staff today.   Discussions with Specialists or Other Care Team Provider: Nephrology    Education and Discussions with Family / Patient: Patient declined call to .     Total Time Spent on Date of Encounter in care of patient: 30 mins. This time was spent on one or more of the following: performing physical exam; counseling and coordination of care; obtaining or reviewing history; documenting in the medical record; reviewing/ordering tests, medications or procedures; communicating with other healthcare professionals and discussing with patient's family/caregivers.    Current Length of Stay: 0 day(s)  Current Patient Status: Inpatient   Certification Statement: The patient will continue to require additional inpatient hospital stay due to Hyponatremia  Discharge Plan: Anticipate discharge tomorrow to home.    Code Status: Level 1 - Full Code    Subjective:   Patient states that her speech and dizziness are better this morning.  However patient is refusing lab work at this time.  I personally explained the risks and we need to monitor her sodium levels.     Objective:     Vitals:   Temp (24hrs), Av.2 °F (36.8 °C), Min:97.9 °F (36.6 °C), Max:98.8 °F (37.1 °C)    Temp:  [97.9 °F (36.6 °C)-98.8 °F (37.1  °C)] 98 °F (36.7 °C)  HR:  [75-86] 81  Resp:  [16-18] 18  BP: (124-138)/(76-83) 138/83  SpO2:  [93 %-94 %] 93 %  Body mass index is 34.57 kg/m².     Input and Output Summary (last 24 hours):     Intake/Output Summary (Last 24 hours) at 1/12/2024 1207  Last data filed at 1/12/2024 0824  Gross per 24 hour   Intake 240 ml   Output --   Net 240 ml       Physical Exam:   Physical Exam  Vitals and nursing note reviewed.   Constitutional:       Appearance: Normal appearance.   HENT:      Head: Normocephalic and atraumatic.      Mouth/Throat:      Mouth: Mucous membranes are moist.      Pharynx: Oropharynx is clear.   Cardiovascular:      Rate and Rhythm: Normal rate and regular rhythm.   Pulmonary:      Effort: Pulmonary effort is normal.      Breath sounds: Normal breath sounds.   Abdominal:      General: There is no distension.      Tenderness: There is no abdominal tenderness.   Musculoskeletal:      Right lower leg: No edema.      Left lower leg: No edema.   Skin:     General: Skin is warm and dry.   Neurological:      Mental Status: She is alert and oriented to person, place, and time.   Psychiatric:         Mood and Affect: Mood normal.         Behavior: Behavior normal.          Additional Data:     Labs:  Results from last 7 days   Lab Units 01/11/24  0828   WBC Thousand/uL 7.54   HEMOGLOBIN g/dL 13.4   HEMATOCRIT % 39.4   PLATELETS Thousands/uL 282   NEUTROS PCT % 78*   LYMPHS PCT % 12*   MONOS PCT % 7   EOS PCT % 2     Results from last 7 days   Lab Units 01/12/24  0623 01/11/24  1602 01/11/24  0828   SODIUM mmol/L 134*   < > 124*   POTASSIUM mmol/L 3.6   < > 3.9   CHLORIDE mmol/L 98   < > 87*   CO2 mmol/L 29   < > 30   BUN mg/dL 10   < > 11   CREATININE mg/dL 0.58*   < > 0.60   ANION GAP mmol/L 7   < > 7   CALCIUM mg/dL 8.8   < > 10.1   ALBUMIN g/dL  --   --  4.2   TOTAL BILIRUBIN mg/dL  --   --  0.45   ALK PHOS U/L  --   --  72   ALT U/L  --   --  17   AST U/L  --   --  34   GLUCOSE RANDOM mg/dL 92   < > 110     < > = values in this interval not displayed.                       Lines/Drains:  Invasive Devices       Peripheral Intravenous Line  Duration             Peripheral IV 01/11/24 Dorsal (posterior);Left Hand 1 day    Peripheral IV 01/11/24 Left Antecubital 1 day                      Telemetry:  Telemetry Orders (From admission, onward)               24 Hour Telemetry Monitoring  Continuous x 24 Hours (Telem)        Expiring   Question:  Reason for 24 Hour Telemetry  Answer:  Arrhythmias requiring acute medical intervention / PPM or ICD malfunction                     Telemetry Reviewed: Normal Sinus Rhythm  Indication for Continued Telemetry Use: No indication for continued use. Will discontinue.              Imaging: No pertinent imaging reviewed.    Recent Cultures (last 7 days):         Last 24 Hours Medication List:   Current Facility-Administered Medications   Medication Dose Route Frequency Provider Last Rate    amLODIPine  5 mg Oral Daily Raheem Martinez      celecoxib  200 mg Oral Daily Raheem Martinez      dextrose  150 mL/hr Intravenous Continuous Sangeeta K Karli,  mL/hr (01/12/24 0955)    folic acid  400 mcg Oral Daily Raheem Martinez      heparin (porcine)  5,000 Units Subcutaneous Q8H YUMIKO Raheem Martinez      levothyroxine  25 mcg Oral Early Morning Raheem Martinez      LORazepam  2 mg Oral Q6H PRN Raheem Martinez      pantoprazole  40 mg Oral BID AC Raheem Martinez      PARoxetine  60 mg Oral Daily Raheem Martinez      QUEtiapine  400 mg Oral HS Raheem Martinez      sucralfate  1 g Oral TID With Meals Raheem Martinez      ziprasidone  80 mg Oral Daily Raheem Martinez          Today, Patient Was Seen By: Gavin Figueroa MD    **Please Note: This note may have been constructed using a voice recognition system.**

## 2024-01-12 NOTE — UTILIZATION REVIEW
Initial Clinical Review    OBSERVATION WRITTEN 1/11/24 @ 1340 CONVERTED TO INPATIENT ADMISSION 1/12/24 @ 1028 DUE TO FURTHER DIAGNOSTIC WORKUP REQUIRED FOR DEHYDRATION, WEAKNESS AND SPEECH DIFFICULTIES, REQUIRING AT LEAST A 2 MIDNIGHT STAY.    Admission: Date/Time/Statement:   Admission Orders (From admission, onward)       Ordered        01/12/24 1028  Inpatient Admission  Once            01/11/24 1340  Place in Observation  Once                          Orders Placed This Encounter   Procedures    Inpatient Admission     Standing Status:   Standing     Number of Occurrences:   1     Order Specific Question:   Level of Care     Answer:   Med Surg [16]     Order Specific Question:   Estimated length of stay     Answer:   More than 2 Midnights     Order Specific Question:   Certification     Answer:   I certify that inpatient services are medically necessary for this patient for a duration of greater than two midnights. See H&P and MD Progress Notes for additional information about the patient's course of treatment.     ED Arrival Information       Expected   -    Arrival   1/11/2024 08:06    Acuity   Urgent              Means of arrival   Ambulance    Escorted by   [facility]    Service   Hospitalist    Admission type   Emergency              Arrival complaint   dizziness; covid exposure             Chief Complaint   Patient presents with    Dizziness     Reports had concussion in July, feels dizzier than normal. Also reports was visiting mother at [facility] and believes she was exposed to covid. Reports low grade fevers.       Initial Presentation: 63 y.o. female who presented to [facility] ED on 1/11/24 via EMS initially admitted Observation status then converted to Inpatient due to Dehydration, Weakness and Speech difficulties.  Presented due to generalized weakness, dizziness, slurring of speech, and decreased appetite since Sunday. She has been visiting her mom who is in a facility and  there was a COVID outbreak.  Patient is concerned that she may have contracted COVID.  She came to the ED for workup which is generally unremarkable however due to occasionally slurred speech she was referred for admission. She reports that her baseline health admittedly is not fantastic; she reports postconcussive syndrome after an injury at a shopping center last year.  Since then she has had chronic headaches and dizziness.  She has followed up with neuro in the outpatient setting for same. PMH: postconcussive syndrome, hyperlipidemia, hypertension, hypothyroidism, pneumonia, and schizoaffective disorder disorderPlan: med surg, PT TO eval, consider MRI, monitor electrolytes and replete prn, consider psych eval.     1/12/2024 Inpatient Admission:   Patient states that her speech and dizziness are better this morning.  However patient is refusing lab work at this time.  Explained the risks and we need to monitor her sodium levels.  Nephrology consult.    1/12 Per Nephrology: consult for hyponatremia: start D5, fu on labs, strict IO, fu on urine Na and osmo, trend BP.    Date:   1/13  Day 3: Has surpassed a 2nd midnight with active treatments and services, which include monitoring labs.    ED Triage Vitals   Temperature Pulse Respirations Blood Pressure SpO2   01/11/24 0811 01/11/24 0811 01/11/24 0811 01/11/24 0811 01/11/24 0811   99.3 °F (37.4 °C) 92 18 162/89 96 %      Temp Source Heart Rate Source Patient Position - Orthostatic VS BP Location FiO2 (%)   01/11/24 0811 01/11/24 0811 01/11/24 1130 01/11/24 0811 --   Oral Monitor Lying Right arm       Pain Score       01/11/24 0811       5          Wt Readings from Last 1 Encounters:   01/11/24 85.7 kg (189 lb)     Additional Vital Signs:     Date/Time Temp Pulse Resp BP MAP (mmHg) SpO2 O2 Device Patient Position - Orthostatic VS   01/13/24 11:09:54 97.7 °F (36.5 °C) -- 20 131/80 97 -- -- --   01/13/24 08:10:06 97.3 °F (36.3 °C) Abnormal  93 -- 137/79 98 95 % -- --    01/13/24 0807 -- -- -- 137/79 -- -- -- --   01/12/24 22:41:57 97.4 °F (36.3 °C) Abnormal  -- 16 144/81 102 -- -- --   01/12/24 1955 -- -- -- -- -- 94 % None (Room air) --   01/12/24 1600 -- -- -- -- -- -- None (Room air) --   01/12/24 15:42:55 97.5 °F (36.4 °C) 83 20 126/81 96 94 % -- --   01/12/24 13:24:17 97.7 °F (36.5 °C) 86 16 127/79 95 96 % -- --   01/12/24 10:58:29 98 °F (36.7 °C) 81 18 138/83 101 93 % -- --   01/12/24 07:24:19 98.1 °F (36.7 °C) 75 16 124/76 92 93 % None (Room air) Lying   01/12/24 06:46:24 97.9 °F (36.6 °C) 80 -- 129/80 96 94 % -- --   01/11/24 22:59:14 -- 75 -- 128/83 98 94 % -- --   01/11/24 2000 -- -- -- -- -- -- None (Room air) --   01/11/24 18:42:03 98.8 °F (37.1 °C) -- -- 130/81 97 -- -- --   01/11/24 1410 -- 86 18 129/81 100 94 % -- --   01/11/24 1230 -- -- 18 -- -- -- -- --   01/11/24 1130 -- 78 21 140/78 100 92 % None (Room air) Lying   01/11/24 0935 -- -- -- -- -- -- None (Room air) --   01/11/24 0811 99.3 °F (37.4 °C) 92 18 162/89 -- 96 % None (Room air) --     Pertinent Labs/Diagnostic Test Results:   1/11 EKG: NSR    CT head wo contrast   Final Result by David Montero MD (01/11 1441)      No acute intracranial hemorrhage, mass effect or edema.  Chronic microangiopathic changes.                  Workstation performed: [id number]           Results from last 7 days   Lab Units 01/11/24  1350   SARS-COV-2  Negative     Results from last 7 days   Lab Units 01/13/24  0652 01/11/24  0828   WBC Thousand/uL 3.07* 7.54   HEMOGLOBIN g/dL 12.5 13.4   HEMATOCRIT % 37.7 39.4   PLATELETS Thousands/uL 238 282   NEUTROS ABS Thousands/µL 2.09 5.90         Results from last 7 days   Lab Units 01/13/24  0652 01/12/24  1157 01/12/24  0623 01/12/24  0134 01/11/24  1602   SODIUM mmol/L 137 130* 134* 137 134*   POTASSIUM mmol/L 3.8 3.5 3.6 3.8 3.2*   CHLORIDE mmol/L 99 96 98 100 97   CO2 mmol/L 29 29 29 31 31   ANION GAP mmol/L 9 5 7 6 6   BUN mg/dL 11 10 10 10 9   CREATININE mg/dL 0.50* 0.55*  0.58* 0.64 0.64   EGFR ml/min/1.73sq m 103 100 98 95 95   CALCIUM mg/dL 9.0 8.6 8.8 9.4 10.1   MAGNESIUM mg/dL 2.0  --   --   --   --    PHOSPHORUS mg/dL 3.2  --   --   --   --      Results from last 7 days   Lab Units 01/11/24  0828   AST U/L 34   ALT U/L 17   ALK PHOS U/L 72   TOTAL PROTEIN g/dL 7.1   ALBUMIN g/dL 4.2   TOTAL BILIRUBIN mg/dL 0.45         Results from last 7 days   Lab Units 01/13/24  0652 01/12/24  1157 01/12/24  0623 01/12/24  0134 01/11/24  1602 01/11/24  0828   GLUCOSE RANDOM mg/dL 86 97 92 92 106 110       Results from last 7 days   Lab Units 01/11/24  1252 01/11/24  0828   HS TNI 0HR ng/L  --  7   HS TNI 2HR ng/L 5  --    HSTNI D2 ng/L -2  --      Results from last 7 days   Lab Units 01/13/24  0519   OSMO UR mmol/*     Results from last 7 days   Lab Units 01/13/24  0519 01/11/24  1332   CLARITY UA   --  Clear   COLOR UA   --  Colorless   SPEC GRAV UA   --  1.002*   PH UA   --  7.0   GLUCOSE UA mg/dl  --  Negative   KETONES UA mg/dl  --  Negative   BLOOD UA   --  Negative   PROTEIN UA mg/dl  --  Negative   NITRITE UA   --  Negative   BILIRUBIN UA   --  Negative   UROBILINOGEN UA (BE) mg/dl  --  <2.0   LEUKOCYTES UA   --  Negative   SODIUM UR  40  --      Results from last 7 days   Lab Units 01/11/24  1350   INFLUENZA A PCR  Negative   INFLUENZA B PCR  Negative   RSV PCR  Negative       ED Treatment:   Medication Administration from 01/11/2024 0806 to 01/11/2024 1816         Date/Time Order Dose Route Action     01/11/2024 0903 EST lactated ringers bolus 1,000 mL 1,000 mL Intravenous New Bag     01/11/2024 1550 EST sodium chloride 0.9 % infusion 100 mL/hr Intravenous New Bag     01/11/2024 1547 EST pantoprazole (PROTONIX) EC tablet 40 mg 40 mg Oral Given     01/11/2024 1546 EST sucralfate (CARAFATE) tablet 1 g 1 g Oral Given          Past Medical History:   Diagnosis Date    Anemia     Anxiety     Arthritis     Back pain at L4-L5 level     Schreiber esophagus     Bulimia     Colon polyp      Disease of thyroid gland     hypothyroid    GERD (gastroesophageal reflux disease)     Hearing loss in left ear     History of transfusion     Hyperlipidemia     Hypertension     Hypothyroidism     Leukopenia     NMS (neuroleptic malignant syndrome) 01/03/2020    Pneumonia     RA (rheumatoid arthritis) ([facility])     in feet    Sciatica     Seizure ([facility])     due to medication mix up 8/2018 only one historically    Skin spots, red     lower right arm - poss from cat- no s/s infection    Synovial cyst of lumbar spine     Vertigo     Wears dentures     full set    Weight gain      Present on Admission:   Acquired hypothyroidism   (Resolved) Dizziness   Schizoaffective disorder (HCC)   Hyponatremia   Primary hypertension      Admitting Diagnosis: Dehydration [E86.0]  Dizziness [R42]  Weakness [R53.1]  Difficulty with speech [R47.9]  Age/Sex: 63 y.o. female  Admission Orders:  Scheduled Medications:  amLODIPine, 5 mg, Oral, Daily  celecoxib, 200 mg, Oral, Daily  folic acid, 400 mcg, Oral, Daily  heparin (porcine), 5,000 Units, Subcutaneous, Q8H YUMIKO  levothyroxine, 25 mcg, Oral, Early Morning  pantoprazole, 40 mg, Oral, BID AC  PARoxetine, 60 mg, Oral, Daily  QUEtiapine, 400 mg, Oral, HS  sucralfate, 1 g, Oral, TID With Meals  ziprasidone, 80 mg, Oral, Daily      Continuous IV Infusions:   dextrose, 150 mL/hr, Intravenous, Continuous        PRN Meds:  LORazepam, 2 mg, Oral, Q6H PRN    scd    IP CONSULT TO NEPHROLOGY    Network Utilization Review Department  ATTENTION: Please call with any questions or concerns to [phone] and carefully listen to the prompts so that you are directed to the right person. All voicemails are confidential.   For Discharge needs, contact Care Management DC Support Team at [phone] opt. [phone]  Send all requests for admission clinical reviews, approved or denied determinations and any other requests to dedicated fax number below belonging to the campus where the patient is receiving treatment.  List of dedicated fax numbers for the Facilities:  FACILITY NAME UR FAX NUMBER   ADMISSION DENIALS (Administrative/Medical Necessity) [phone]   DISCHARGE SUPPORT TEAM (NETWORK) [phone]   PARENT CHILD HEALTH (Maternity/NICU/Pediatrics) [phone]   [facility] [phone]   [facility] [phone]   [facility] [phone]   [facility] [phone]   [facility] [phone]   [facility] [phone]   [facility] [phone]   [facility] [phone]   [facility] [phone]   Counts include 234 beds at the [facility] [phone]   [facility] [phone]

## 2024-01-12 NOTE — ASSESSMENT & PLAN NOTE
Patient with chronic hyponatremia likely related to her multiple psych medications.  Comes in with sodium of 122  She is hypochloremic and endorses poor p.o. intake, suspect she may be a little prerenal.  She was given 1 L of LR in the ED  Correction to 134 -137 - 134  Nephrology on board managing  Patient has now refused blood work due-explained risks to patient with not getting current blood work nephrology made aware and is speaking with patient

## 2024-01-12 NOTE — PROGRESS NOTES
Consulted for overcorrecting sodium. Na 124 on admission yesterday at 8 am. Goal was 130-132 by 8 am today. Na already increased to 138. This is likely hypovolemic hyponatremia as it corrected rapidly with isotonic fluids. Give D5 200 cc per hr for next 4 hours and DDAVP 2 mcg IV once. Repeat BMP 4 hours. Check urine osm and sodium.    Addendum 8 AM:  Serum sodium decreased to 134.  Goal was around 132 by this morning.  Start D5W at 100 cc/h with repeat BMP at 10 AM.  Goal serum sodium by noon tomorrow no more than 134.

## 2024-01-12 NOTE — OCCUPATIONAL THERAPY NOTE
Occupational Therapy Evaluation     Patient Name: Irene Plascencia  Today's Date: 1/12/2024  Problem List  Active Problems:    Acquired hypothyroidism    Schizoaffective disorder (HCC)    Hyponatremia    Past Medical History  Past Medical History:   Diagnosis Date    Anemia     Anxiety     Arthritis     Back pain at L4-L5 level     Schreiber esophagus     Bulimia     Colon polyp     Disease of thyroid gland     hypothyroid    GERD (gastroesophageal reflux disease)     Hearing loss in left ear     History of transfusion     Hyperlipidemia     Hypertension     Hypothyroidism     Leukopenia     NMS (neuroleptic malignant syndrome) 01/03/2020    Pneumonia     RA (rheumatoid arthritis) (HCC)     in feet    Sciatica     Seizure (HCC)     due to medication mix up 8/2018 only one historically    Skin spots, red     lower right arm - poss from cat- no s/s infection    Synovial cyst of lumbar spine     Vertigo     Wears dentures     full set    Weight gain      Past Surgical History  Past Surgical History:   Procedure Laterality Date    BONE MARROW BIOPSY      BREAST SURGERY      enlargement with implants    CLOSED REDUCTION DISTAL FEMUR FRACTURE      COLONOSCOPY      COSMETIC SURGERY      DENTAL SURGERY      HIP ARTHROPLASTY Right 01/02/2020    Procedure: REVISION TOTAL HIP ARTHROPLASTY WITH REPAIR OF PARIPROSTHETIC FRACTURE - POSTERIOR APPROACH;  Surgeon: Dilan Pedersen MD;  [facility]: [facility];  Service: Orthopedics    AK AMPUTATION METATARSAL W/TOE SINGLE Left 04/07/2023    Procedure: AMPUTATION TOE 4th;  Surgeon: Conner Bartlett DPM;  Location:  MAIN OR;  Service: Podiatry    AK ARTHRP ACETBLR/PROX FEM PROSTC AGRFT/ALGRFT Right 12/11/2019    Procedure: ARTHROPLASTY HIP TOTAL ANTERIOR;  Surgeon: Dilan Pedersen MD;  Location:  MAIN OR;  Service: Orthopedics    AK ESOPHAGOGASTRODUODENOSCOPY TRANSORAL DIAGNOSTIC N/A 05/11/2021    Procedure: ESOPHAGOGASTRODUODENOSCOPY (EGD);  Surgeon: David Cedeño MD;   "[facility];  Service: General    NH LAPS RPR PARAESPHGL HRNA INCL FUNDPLSTY W/MESH N/A 05/11/2021    Procedure: REPAIR HERNIA PARAESOPHAGEAL  LAPAROSCOPIC;  Surgeon: David Cedeño MD;  [facility];  Service: General    NH UNLISTED PROCEDURE ESOPHAGUS N/A 05/11/2021    Procedure: FUNDOPLICATION TRANSORAL INCISIONLESS;  Surgeon: Brad Cadet MD;  [facility];  Service: Gastroenterology    RHINOPLASTY      SINUS SURGERY      TOE AMPUTATION Left     2023 4th toe    TRANSORAL INCISIONLESS FUNDOPLICATION (TIF)  05/11/2021 01/12/24 0841   OT Last Visit   OT Visit Date 01/12/24   Note Type   Note type Evaluation   Pain Assessment   Pain Assessment Tool 0-10   Pain Score No Pain   Restrictions/Precautions   Weight Bearing Precautions Per Order No   Other Precautions Multiple lines;Fall Risk;Pain   Home Living   Type of Home House   Home Layout One level   Bathroom Shower/Tub Tub/shower unit   Bathroom Toilet Raised   Bathroom Equipment Grab bars in shower   Prior Function   Level of [place] Independent with ADLs   Lives With Alone   Receives Help From   (reports no support)   IADLs Independent with driving   Falls in the last 6 months 0   Vocational Retired   Lifestyle   Autonomy pta pt reports I inADLs/IADLs/functional mobility   Reciprocal Relationships lives alone, limited support, mom in [place]   Service to Others retired   Intrinsic Gratification enjoys seeing her mom   Subjective   Subjective \"I've been dizzy since July\"   ADL   Where Assessed Chair   Eating Assistance 5  Supervision/Setup   Grooming Assistance 5  Supervision/Setup   UB Bathing Assistance 5  Supervision/Setup   LB Bathing Assistance 5  Supervision/Setup   UB Dressing Assistance 5  Supervision/Setup   LB Dressing Assistance 5  Supervision/Setup   Toileting Assistance  5  Supervision/Setup   Bed Mobility   Supine to Sit 5  Supervision   Transfers   Sit to Stand 5  Supervision   Stand to Sit 5  Supervision "   Functional Mobility   Functional Mobility 5  Supervision   Balance   Static Sitting Fair +   Dynamic Sitting Fair   Static Standing Fair   Dynamic Standing Fair   Ambulatory Fair   Activity Tolerance   Activity Tolerance Patient tolerated treatment well   Medical Staff Made Aware PT Alex 2* medical complexity/comorbidities   Nurse Made Aware okay to see per RN   RUE Assessment   RUE Assessment WFL   LUE Assessment   LUE Assessment WFL   Hand Function   Gross Motor Coordination Functional   Fine Motor Coordination Functional   Psychosocial   Psychosocial (WDL) WDL   Cognition   Overall Cognitive Status WFL   Arousal/Participation Cooperative   Attention Within functional limits   Orientation Level Oriented X4   Memory Within functional limits   Following Commands Follows all commands and directions without difficulty   Comments pt pleasant and cooperative   Assessment   Assessment Pt is a 63 y.o. YO  female admitted to [facility] on [facility]/11/2024 w/ hyponatremia. Pt  has a past medical history of Anemia, Anxiety, Arthritis, Back pain at L4-L5 level, Schreiber esophagus, Bulimia, Colon polyp, Disease of thyroid gland, GERD (gastroesophageal reflux disease), Hearing loss in left ear, History of transfusion, Hyperlipidemia, Hypertension, Hypothyroidism, Leukopenia, NMS (neuroleptic malignant syndrome), Pneumonia, RA (rheumatoid arthritis) (HCC), Sciatica, Seizure (HCC), Skin spots, red, Synovial cyst of lumbar spine, Vertigo, Wears dentures, and Weight gain. Pt with active OT orders and up and OOB as tolerated orders. . Pt resides in a house alone. Pt was I w/  ADLS and IADLS, (+) drove, & required no use of DME PTA. Currently pt is SUPERVISION FOR ADLS/FUNCTIONAL MOBILITY. Pt is limited at this time 2*: endurance, activity tolerance, functional mobility, unsupportive home environment, decreased I w/ ADLS/IADLS, and decreased safety awareness.The following Occupational Performance Areas to address include: bathing/shower, toilet  hygiene, dressing, and functional mobility. Based on the aforementioned OT evaluation, functional performance deficits, and assessments, pt has been identified as a moderate complexity evaluation. From OT standpoint, anticipate d/c  OUTPATIENT OT .   The patient's raw score on the AM-PAC Daily Activity Inpatient Short Form is 24. A raw score of greater than or equal to 19 suggests the patient may benefit from discharge to home. Please refer to the recommendation of the Occupational Therapist for safe discharge planning. Recommend continued participation in ADLs and functional mobility w/ staff. No further acute OT needs, d/c OT. Please re-consult if necessary.   Goals   Patient Goals go home   Discharge Recommendation   Rehab Resource Intensity Level, OT (S)  III (Minimum Resource Intensity)  (OUTPATIENT OT- FITNESS TO DRIVE)   AM-PAC Daily Activity Inpatient   Lower Body Dressing 4   Bathing 4   Toileting 4   Upper Body Dressing 4   Grooming 4   Eating 4   Daily Activity Raw Score 24   Daily Activity Standardized Score (Calc for Raw Score >=11) 57.54   AM-PAC Applied Cognition Inpatient   Following a Speech/Presentation 4   Understanding Ordinary Conversation 4   Taking Medications 4   Remembering Where Things Are Placed or Put Away 4   Remembering List of 4-5 Errands 4   Taking Care of Complicated Tasks 4   Applied Cognition Raw Score 24   Applied Cognition Standardized Score 62.21       Juanis Sims MS, OTR/L

## 2024-01-12 NOTE — QUICK NOTE
Patient presented for hyponatremia with sodium 124, she appeared somewhat hypovolemic although her multiple psychiatric meds likely contributing to some extent to her hyponatremia.  She had been given LR in the ED and then subsequently started on maintenance normal saline.  Repeat BMP with sodium 134, this is a little too rapid of a correction so we will switch to D5W at 50 cc/hour and repeat BMP in 4 hours.

## 2024-01-12 NOTE — ASSESSMENT & PLAN NOTE
Patient endorsing chronic dizziness which she believes is related to postconcussive syndrome after a clothes display fell on her head at [employer] in July 2023  She has followed up with neurology since then.  Feels like her symptoms presently are a little worse than usual.  No focal neurologic deficits.  CT head ordered and will follow-up results.  PT/OT ordered, patient states she sees outpatient PT and OT for vestibular training  Meclizine as needed.  Attempt conservative measures and if symptoms unrelieved or neurologic exam changing, consider inpatient MRI

## 2024-01-13 VITALS
DIASTOLIC BLOOD PRESSURE: 80 MMHG | HEIGHT: 62 IN | BODY MASS INDEX: 34.78 KG/M2 | HEART RATE: 93 BPM | RESPIRATION RATE: 20 BRPM | WEIGHT: 189 LBS | SYSTOLIC BLOOD PRESSURE: 131 MMHG | TEMPERATURE: 97.7 F | OXYGEN SATURATION: 95 %

## 2024-01-13 LAB
ANION GAP SERPL CALCULATED.3IONS-SCNC: 9 MMOL/L
BASOPHILS # BLD AUTO: 0.01 THOUSANDS/ÂΜL (ref 0–0.1)
BASOPHILS NFR BLD AUTO: 0 % (ref 0–1)
BUN SERPL-MCNC: 11 MG/DL (ref 5–25)
CALCIUM SERPL-MCNC: 9 MG/DL (ref 8.4–10.2)
CHLORIDE SERPL-SCNC: 99 MMOL/L (ref 96–108)
CO2 SERPL-SCNC: 29 MMOL/L (ref 21–32)
CORTIS SERPL-MCNC: 5.4 UG/DL
CREAT SERPL-MCNC: 0.5 MG/DL (ref 0.6–1.3)
EOSINOPHIL # BLD AUTO: 0.18 THOUSAND/ÂΜL (ref 0–0.61)
EOSINOPHIL NFR BLD AUTO: 6 % (ref 0–6)
ERYTHROCYTE [DISTWIDTH] IN BLOOD BY AUTOMATED COUNT: 13.3 % (ref 11.6–15.1)
GFR SERPL CREATININE-BSD FRML MDRD: 103 ML/MIN/1.73SQ M
GLUCOSE SERPL-MCNC: 86 MG/DL (ref 65–140)
HCT VFR BLD AUTO: 37.7 % (ref 34.8–46.1)
HGB BLD-MCNC: 12.5 G/DL (ref 11.5–15.4)
IMM GRANULOCYTES # BLD AUTO: 0.01 THOUSAND/UL (ref 0–0.2)
IMM GRANULOCYTES NFR BLD AUTO: 0 % (ref 0–2)
LYMPHOCYTES # BLD AUTO: 0.52 THOUSANDS/ÂΜL (ref 0.6–4.47)
LYMPHOCYTES NFR BLD AUTO: 17 % (ref 14–44)
MAGNESIUM SERPL-MCNC: 2 MG/DL (ref 1.9–2.7)
MCH RBC QN AUTO: 28.5 PG (ref 26.8–34.3)
MCHC RBC AUTO-ENTMCNC: 33.2 G/DL (ref 31.4–37.4)
MCV RBC AUTO: 86 FL (ref 82–98)
MONOCYTES # BLD AUTO: 0.26 THOUSAND/ÂΜL (ref 0.17–1.22)
MONOCYTES NFR BLD AUTO: 9 % (ref 4–12)
NEUTROPHILS # BLD AUTO: 2.09 THOUSANDS/ÂΜL (ref 1.85–7.62)
NEUTS SEG NFR BLD AUTO: 68 % (ref 43–75)
NRBC BLD AUTO-RTO: 0 /100 WBCS
OSMOLALITY UR: 200 MMOL/KG
PHOSPHATE SERPL-MCNC: 3.2 MG/DL (ref 2.3–4.1)
PLATELET # BLD AUTO: 238 THOUSANDS/UL (ref 149–390)
PMV BLD AUTO: 8.7 FL (ref 8.9–12.7)
POTASSIUM SERPL-SCNC: 3.8 MMOL/L (ref 3.5–5.3)
RBC # BLD AUTO: 4.38 MILLION/UL (ref 3.81–5.12)
SODIUM 24H UR-SCNC: 40 MOL/L
SODIUM SERPL-SCNC: 137 MMOL/L (ref 135–147)
WBC # BLD AUTO: 3.07 THOUSAND/UL (ref 4.31–10.16)

## 2024-01-13 PROCEDURE — 82533 TOTAL CORTISOL: CPT | Performed by: NURSE PRACTITIONER

## 2024-01-13 PROCEDURE — 99239 HOSP IP/OBS DSCHRG MGMT >30: CPT | Performed by: STUDENT IN AN ORGANIZED HEALTH CARE EDUCATION/TRAINING PROGRAM

## 2024-01-13 PROCEDURE — 84100 ASSAY OF PHOSPHORUS: CPT | Performed by: NURSE PRACTITIONER

## 2024-01-13 PROCEDURE — 83735 ASSAY OF MAGNESIUM: CPT | Performed by: NURSE PRACTITIONER

## 2024-01-13 PROCEDURE — 83935 ASSAY OF URINE OSMOLALITY: CPT | Performed by: NURSE PRACTITIONER

## 2024-01-13 PROCEDURE — 80048 BASIC METABOLIC PNL TOTAL CA: CPT | Performed by: NURSE PRACTITIONER

## 2024-01-13 PROCEDURE — 84300 ASSAY OF URINE SODIUM: CPT | Performed by: NURSE PRACTITIONER

## 2024-01-13 PROCEDURE — 85025 COMPLETE CBC W/AUTO DIFF WBC: CPT

## 2024-01-13 RX ADMIN — LEVOTHYROXINE SODIUM 25 MCG: 25 TABLET ORAL at 05:01

## 2024-01-13 RX ADMIN — SUCRALFATE 1 G: 1 TABLET ORAL at 08:07

## 2024-01-13 RX ADMIN — HEPARIN SODIUM 5000 UNITS: 5000 INJECTION INTRAVENOUS; SUBCUTANEOUS at 05:01

## 2024-01-13 RX ADMIN — PANTOPRAZOLE SODIUM 40 MG: 40 TABLET, DELAYED RELEASE ORAL at 05:01

## 2024-01-13 RX ADMIN — ZIPRASIDONE HYDROCHLORIDE 80 MG: 40 CAPSULE ORAL at 08:14

## 2024-01-13 RX ADMIN — AMLODIPINE BESYLATE 5 MG: 5 TABLET ORAL at 08:07

## 2024-01-13 RX ADMIN — CELECOXIB 200 MG: 200 CAPSULE ORAL at 08:13

## 2024-01-13 RX ADMIN — FOLIC ACID TAB 400 MCG 400 MCG: 400 TAB at 08:07

## 2024-01-13 RX ADMIN — PAROXETINE HYDROCHLORIDE 60 MG: 20 TABLET, FILM COATED ORAL at 08:07

## 2024-01-13 NOTE — ASSESSMENT & PLAN NOTE
Patient with chronic hyponatremia likely related to her multiple psych medications.  Comes in with sodium of 122  She is hypochloremic and endorses poor p.o. intake, suspect she may be a little prerenal.  She was given 1 L of LR in the ED  Correction to 134 -137 - 134  Nephrology on board managing  Sodium improved this morning.  Continue fluid restrictions on discharge.

## 2024-01-13 NOTE — ASSESSMENT & PLAN NOTE
"Patient on significant regimen including Geodon 80 daily, Seroquel 400 nightly, lorazepam 2 mg p.o. every 6 hours, Paxil 60 mg daily  Reviewed EKG with normal QTc interval  She tells me that she has not seen her psychiatrist in 3 years because he moved to another state, but she is reluctant to consider medication changes because \"these are the only things that work\"  I do not know necessarily that meds need to be adjusted while in hospital, but it is possible that medication side effect could be contributing to some of her dizziness and possible speech issues.   Follow-up with psychiatry in outpatient setting.  "

## 2024-01-13 NOTE — CASE MANAGEMENT
Case Management Discharge Planning Note    Patient name Irene Plascencia  Location [facility] 832/[facility] 832-01 MRN [id number]  : 1960 Date 2024       Current Admission Date: 2024  Current Admission Diagnosis:Hyponatremia   Patient Active Problem List    Diagnosis Date Noted    Vitamin B12 deficiency (dietary) anemia 2023    Folate deficiency 2023    Neutropenia (HCC) 2023    Post concussion syndrome 2023    Sepsis without acute organ dysfunction ([facility]) 2023    Acute respiratory failure with hypoxia ([facility]) 2023    Headache 2023    Nutritional anemia 2023    Stress incontinence 2023    Ulcer of toe, chronic, left, with unspecified severity ([facility]) 2023    Rheumatoid arthritis involving both feet, unspecified whether rheumatoid factor present ([facility]) 2023    Acute encephalopathy 2022    Thrombocytosis 2022    Abdominal pain 2022    Schreiber's esophagus 2022    Microcytic anemia 2022    Self neglect 2022    Pruritus 2022    Upper GI bleed 01/10/2022    Nausea and vomiting 10/22/2021    S/P tooth extraction 10/22/2021    SIRS (systemic inflammatory response syndrome) ([facility]) 2021    Hyperlipidemia 2021    Word finding difficulty 2021    Hiatal hernia 2021    Polyp of colon 2021    Melena 2020    Cellulitis of left upper extremity 2020    Erosive esophagitis 2020    Rash 2020    Iron deficiency anemia 2020    Multiple excoriations 2020    Acute non-recurrent maxillary sinusitis 2020    Fall 2020    Esophagitis 2020    Hyponatremia 2020    Problem related to living arrangement 2020    Hypokalemia     NMS (neuroleptic malignant syndrome) 2020    Preop exam for internal medicine 2019    Severe iron deficiency anemia  2019    Chronic bilateral low back pain without sciatica 2019    Right hip  pain 07/15/2019    Primary hypertension 06/12/2019    Dermal hypersensitivity reaction 11/08/2018    Psychiatric disorder 08/21/2018    Schizoaffective disorder (HCC) 08/16/2018    Serotonin syndrome 08/13/2018    Other fatigue 06/19/2018    Spinal stenosis of lumbar region with neurogenic claudication 06/15/2018    Lumbar spondylosis 06/15/2018    T12 compression fracture (HCC) 06/15/2018    Synovial cyst of lumbar facet joint 06/15/2018    Localized osteoporosis with current pathological fracture with routine healing 05/25/2018    Lumbar radiculopathy 03/19/2018    DDD (degenerative disc disease), lumbar 03/19/2018    Spondylolisthesis at L4-L5 level 03/19/2018    Anxiety 10/31/2016    Acquired hypothyroidism 10/31/2016    Constipation 04/10/2014    Bulimia nervosa in remission 03/19/2014      LOS (days): 1  Geometric Mean LOS (GMLOS) (days): 2.6  Days to GMLOS:1.6     OBJECTIVE:  Risk of Unplanned Readmission Score: 21.67         Current admission status: Inpatient   Preferred Pharmacy:   [facility]/pharmacy #[id number] - [place], [place] - [address]  [address] [place] [address]  Phone: [phone] Fax: [phone]    Primary Care Provider: Raheem Cain MD    Primary Insurance: [email or web address] REP  Secondary Insurance: [employer] [facility]    DISCHARGE DETAILS:    Discharge planning discussed with:: patient  Freedom of Choice: Yes  Comments - Freedom of Choice: FOC reviewed, no current post acute recs  CM contacted family/caregiver?: No- see comments (pt AAO)  Were Treatment Team discharge recommendations reviewed with patient/caregiver?: Yes  Did patient/caregiver verbalize understanding of patient care needs?: Yes  Were patient/caregiver advised of the risks associated with not following Treatment Team discharge recommendations?: Yes    Contacts  Patient Contacts: MOther- Anya   Relationship to Patient:: Family  Contact Method: Phone  Phone Number:  [phone]  Reason/Outcome: Emergency Contact    Requested Home Health Care         Is the patient interested in HHC at discharge?: No            LYFT requested for 11:45AM to d/c home.   Patient updated and aware.

## 2024-01-13 NOTE — DISCHARGE SUMMARY
"[facility]  Discharge- Irene Plascencia 1960, 63 y.o. female MRN: [id number]  [facility] Encounter: [id number]  Primary Care Provider: Raheem Cain MD   Date and time admitted to hospital: 1/11/2024  8:08 AM    * Hyponatremia  Assessment & Plan  Patient with chronic hyponatremia likely related to her multiple psych medications.  Comes in with sodium of 122  She is hypochloremic and endorses poor p.o. intake, suspect she may be a little prerenal.  She was given 1 L of LR in the ED  Correction to 134 -137 - 134  Nephrology on board managing  Sodium improved this morning.  Continue fluid restrictions on discharge.    Primary hypertension  Assessment & Plan  Blood pressure well-controlled with amlodipine, continue same.    Schizoaffective disorder (HCC)  Assessment & Plan  Patient on significant regimen including Geodon 80 daily, Seroquel 400 nightly, lorazepam 2 mg p.o. every 6 hours, Paxil 60 mg daily  Reviewed EKG with normal QTc interval  She tells me that she has not seen her psychiatrist in 3 years because he moved to another state, but she is reluctant to consider medication changes because \"these are the only things that work\"  I do not know necessarily that meds need to be adjusted while in hospital, but it is possible that medication side effect could be contributing to some of her dizziness and possible speech issues.   Follow-up with psychiatry in outpatient setting.    Acquired hypothyroidism  Assessment & Plan  Resume levothyroxine 25 mcg daily, recent TSH in 9/9/2023 WNL        Medical Problems       Resolved Problems  Date Reviewed: 1/13/2024            Resolved    Dizziness 1/12/2024     Resolved by  Gavin Figueroa MD    Difficulty with speech 1/12/2024     Resolved by  Hermelinda Eason MD        Discharging Physician / Practitioner: Hermelinda Eason MD  PCP: Raheem Cain MD  Admission Date:   Admission Orders (From admission, onward)       Ordered   " "     01/12/24 1028  Inpatient Admission  Once            01/11/24 1340  Place in Observation  Once                          Discharge Date: 01/13/24    Consultations During Hospital Stay:  Nephrology.    Procedures Performed:       Significant Findings / Test Results:   IMPRESSION:     No acute intracranial hemorrhage, mass effect or edema.  Chronic microangiopathic changes.    Incidental Findings:       Test Results Pending at Discharge (will require follow up):        Outpatient Tests Requested:  no    Complications:  n0    Reason for Admission: Dizziness and difficulty speech.    Hospital Course:   Irene Plascencia is a 63 y.o. female patient who originally presented to the hospital on 1/11/2024 due to dizziness and difficulty speech.  Patient was initially worried about COVID infection as her mother was in the facility where they are having COVID outbreak however she tested negative.  CT head unremarkable.  Labs were remarkable for hyponatremia with sodium of 122.  Patient does reports poor oral intake, of note she is also on many psych medications.  She was started on IV fluids with overcorrection of sodium.  Nephrology was involved, labs improved with fluid restriction and repeated monitoring.  Sodium 137 on day of discharge.  Patient highly encouraged to follow-up with psychiatry and discussed changes in medications.  She will also follow-up with nephrology and PCP in outpatient setting.    Please see above list of diagnoses and related plan for additional information.     Condition at Discharge: stable    Discharge Day Visit / Exam:   Subjective: Patient seen at bedside, reports no active complaints.  All her symptoms are resolved on date of discharge.  Vitals: Blood Pressure: 137/79 (01/13/24 0810)  Pulse: 93 (01/13/24 0810)  Temperature: (!) 97.3 °F (36.3 °C) (01/13/24 0810)  Temp Source: Oral (01/12/24 0724)  Respirations: 16 (01/12/24 2241)  Height: 5' 2\" (157.5 cm) (01/11/24 1829)  Weight - Scale: " 85.7 kg (189 lb) (01/11/24 1829)  SpO2: 95 % (01/13/24 0810)  Exam:   Physical Exam  Constitutional:       Appearance: Normal appearance.   HENT:      Head: Normocephalic and atraumatic.   Eyes:      Extraocular Movements: Extraocular movements intact.      Conjunctiva/sclera: Conjunctivae normal.   Cardiovascular:      Rate and Rhythm: Normal rate and regular rhythm.      Pulses: Normal pulses.      Heart sounds: Normal heart sounds.   Abdominal:      General: Bowel sounds are normal.      Palpations: Abdomen is soft.   Skin:     General: Skin is warm and dry.   Neurological:      Mental Status: She is alert and oriented to person, place, and time.          Discussion with Family: Patient declined call to .     Discharge instructions/Information to patient and family:   See after visit summary for information provided to patient and family.      Provisions for Follow-Up Care:  See after visit summary for information related to follow-up care and any pertinent home health orders.      Mobility at time of Discharge:   Basic Mobility Inpatient Raw Score: 20  JH-HLM Goal: 6: Walk 10 steps or more  JH-HLM Achieved: 6: Walk 10 steps or more  HLM Goal achieved. Continue to encourage appropriate mobility.     Disposition:   Home    Planned Readmission: no     Discharge Statement:  I spent 42 minutes discharging the patient. This time was spent on the day of discharge. I had direct contact with the patient on the day of discharge. Greater than 50% of the total time was spent examining patient, answering all patient questions, arranging and discussing plan of care with patient as well as directly providing post-discharge instructions.  Additional time then spent on discharge activities.    Discharge Medications:  See after visit summary for reconciled discharge medications provided to patient and/or family.      **Please Note: This note may have been constructed using a voice recognition system**       Continue Regimen: Sarna OTC Betamethasone Detail Level: Zone Render In Strict Bullet Format?: No

## 2024-01-13 NOTE — PLAN OF CARE
Problem: METABOLIC, FLUID AND ELECTROLYTES - ADULT  Goal: Electrolytes maintained within normal limits  Description: INTERVENTIONS:  - Monitor labs and assess patient for signs and symptoms of electrolyte imbalances  - Administer electrolyte replacement as ordered  - Monitor response to electrolyte replacements, including repeat lab results as appropriate  - Instruct patient on fluid and nutrition as appropriate  Outcome: Progressing  Goal: Fluid balance maintained  Description: INTERVENTIONS:  - Monitor labs   - Monitor I/O and WT  - Instruct patient on fluid and nutrition as appropriate  - Assess for signs & symptoms of volume excess or deficit  Outcome: Progressing     Problem: CARDIOVASCULAR - ADULT  Goal: Maintains optimal cardiac output and hemodynamic stability  Description: INTERVENTIONS:  - Monitor I/O, vital signs and rhythm  - Monitor for S/S and trends of decreased cardiac output  - Administer and titrate ordered vasoactive medications to optimize hemodynamic stability  - Assess quality of pulses, skin color and temperature  - Assess for signs of decreased coronary artery perfusion  - Instruct patient to report change in severity of symptoms  Outcome: Progressing     Problem: Knowledge Deficit  Goal: Patient/family/caregiver demonstrates understanding of disease process, treatment plan, medications, and discharge instructions  Description: Complete learning assessment and assess knowledge base.  Interventions:  - Provide teaching at level of understanding  - Provide teaching via preferred learning methods  Outcome: Progressing     Problem: SAFETY ADULT  Goal: Patient will remain free of falls  Description: INTERVENTIONS:  - Educate patient/family on patient safety including physical limitations  - Instruct patient to call for assistance with activity   - Consult OT/PT to assist with strengthening/mobility   - Keep Call bell within reach  - Keep bed low and locked with side rails adjusted as  appropriate  - Keep care items and personal belongings within reach  - Initiate and maintain comfort rounds  - Make Fall Risk Sign visible to staff  - Apply yellow socks and bracelet for high fall risk patients  - Consider moving patient to room near nurses station  Outcome: Progressing

## 2024-01-13 NOTE — QUICK NOTE
Serum sodium has risen up to 137.  Overall rate of correction in 46 hours 13 mEq/L.  Would recommend 1.8-2L/day fluid restriction upon d/c. Renal will sign off.

## 2024-01-13 NOTE — UTILIZATION REVIEW
"NOTIFICATION OF INPATIENT ADMISSION   AUTHORIZATION REQUEST   SERVICING FACILITY:   [facility]  Address: [address] [place] [address]  Tax ID: [id number]  NPI: [id number] ATTENDING PROVIDER:  Attending Name and NPI#: Hermelinda Eason Md [[id number]]  Address: [address] [place] [address]  Phone: [phone]   ADMISSION INFORMATION:  Place of Service: Inpatient [facility]  Place of Service Code: 21  Inpatient Admission Date/Time: 1/12/24 10:28 AM  Discharge Date/Time: 1/13/2024 12:52 PM  Admitting Diagnosis Code/Description:  Dehydration [E86.0]  Dizziness [R42]  Weakness [R53.1]  Difficulty with speech [R47.9]     UTILIZATION REVIEW CONTACT:  Doris Phelps"Janae\" Domingo Utilization   Network Utilization Review Department  Phone: [phone]  Fax: [phone]  Email: [email or web address]  Contact for approvals/pending authorizations, clinical reviews, and discharge.     PHYSICIAN ADVISORY SERVICES:  Medical Necessity Denial & Opss-tp-Gjni Review  Phone: [phone]  Fax: [phone]  Email: [email or web address]     DISCHARGE SUPPORT TEAM:  For Patients Discharge Needs & Updates  Phone: [phone] opt. 2 Fax: [phone]  Email: [email or web address]      "

## 2024-01-13 NOTE — CASE MANAGEMENT
Case Management Assessment & Discharge Planning Note    Patient name Irene Plascencia  Location [facility] 832/[facility] 832-01 MRN [id number]  : 1960 Date 2024       Current Admission Date: 2024  Current Admission Diagnosis:Acquired hypothyroidism   Patient Active Problem List    Diagnosis Date Noted    Vitamin B12 deficiency (dietary) anemia 2023    Folate deficiency 2023    Neutropenia (HCC) 2023    Post concussion syndrome 2023    Sepsis without acute organ dysfunction ([facility]) 2023    Acute respiratory failure with hypoxia ([facility]) 2023    Headache 2023    Nutritional anemia 2023    Stress incontinence 2023    Ulcer of toe, chronic, left, with unspecified severity ([facility]) 2023    Rheumatoid arthritis involving both feet, unspecified whether rheumatoid factor present ([facility]) 2023    Acute encephalopathy 2022    Thrombocytosis 2022    Abdominal pain 2022    Schreiber's esophagus 2022    Microcytic anemia 2022    Self neglect 2022    Pruritus 2022    Upper GI bleed 01/10/2022    Nausea and vomiting 10/22/2021    S/P tooth extraction 10/22/2021    SIRS (systemic inflammatory response syndrome) ([facility]) 2021    Hyperlipidemia 2021    Word finding difficulty 2021    Hiatal hernia 2021    Polyp of colon 2021    Melena 2020    Cellulitis of left upper extremity 2020    Erosive esophagitis 2020    Rash 2020    Iron deficiency anemia 2020    Multiple excoriations 2020    Acute non-recurrent maxillary sinusitis 2020    Fall 2020    Esophagitis 2020    Hyponatremia 2020    Problem related to living arrangement 2020    Hypokalemia     NMS (neuroleptic malignant syndrome) 2020    Preop exam for internal medicine 2019    Severe iron deficiency anemia  2019    Chronic bilateral low back pain without sciatica  11/01/2019    Right hip pain 07/15/2019    Primary hypertension 06/12/2019    Dermal hypersensitivity reaction 11/08/2018    Psychiatric disorder 08/21/2018    Schizoaffective disorder (HCC) 08/16/2018    Serotonin syndrome 08/13/2018    Other fatigue 06/19/2018    Spinal stenosis of lumbar region with neurogenic claudication 06/15/2018    Lumbar spondylosis 06/15/2018    T12 compression fracture (HCC) 06/15/2018    Synovial cyst of lumbar facet joint 06/15/2018    Localized osteoporosis with current pathological fracture with routine healing 05/25/2018    Lumbar radiculopathy 03/19/2018    DDD (degenerative disc disease), lumbar 03/19/2018    Spondylolisthesis at L4-L5 level 03/19/2018    Anxiety 10/31/2016    Acquired hypothyroidism 10/31/2016    Constipation 04/10/2014    Bulimia nervosa in remission 03/19/2014      LOS (days): 1  Geometric Mean LOS (GMLOS) (days): 2.6  Days to GMLOS:1.6     OBJECTIVE:    Risk of Unplanned Readmission Score: 21.67         Current admission status: Inpatient       Preferred Pharmacy:   [facility] #[id number] - [place], [place] - [address]  [address] [address]  Phone: [phone] Fax: [phone]    Primary Care Provider: Raheem Cain MD    Primary Insurance: [employer]  Secondary Insurance: [employer] [facility]    ASSESSMENT:  Active Health Care Proxies       Anya Plascencia Health Care Representative - Mother   Primary Phone: [phone] (Mobile)  Home Phone: [phone]                           Readmission Root Cause  30 Day Readmission: No    Patient Information  Admitted from:: Home  Mental Status: Alert  During Assessment patient was accompanied by: Not accompanied during assessment  Assessment information provided by:: Patient  Primary Caregiver: Self  Support Systems: Self  County of Residence: [place]  What city do you live in?: [place]  Home entry access options. Select all that apply.: Stairs  Number of steps  to enter home.: 2  Do the steps have railings?: Yes  Type of Current Residence: [facility]  Living Arrangements: Lives Alone  Is patient a ?: No    Activities of Daily Living Prior to Admission  Functional Status: Independent  Completes ADLs independently?: Yes  Ambulates independently?: Yes  Does patient use assisted devices?: No  Does patient currently own DME?: Yes  What DME does the patient currently own?: Walker, Straight Cane  Does patient have a history of Outpatient Therapy (PT/OT)?: Yes  Does the patient have a history of Short-Term Rehab?: Yes  Does patient have a history of HHC?: Yes ([facility])  Does patient currently have HHC?: No         Patient Information Continued  Income Source: SSI/SSD  Does patient have prescription coverage?: Yes  Does patient receive dialysis treatments?: No  Does patient have a history of substance abuse?: No  Does patient have a history of Mental Health Diagnosis?: No         Means of Transportation  Means of Transport to Appts:: Public Transportation - Lyft      Housing Stability: Unknown (9/11/2023)    Housing Stability Vital Sign     Unable to Pay for Housing in the Last Year: No     Number of Places Lived in the Last Year: Not on file     Unstable Housing in the Last Year: No   Food Insecurity: No Food Insecurity (9/11/2023)    Hunger Vital Sign     Worried About Running Out of Food in the Last Year: Never true     Ran Out of Food in the Last Year: Never true   Transportation Needs: No Transportation Needs (9/11/2023)    PRAPARE - Transportation     Lack of Transportation (Medical): No     Lack of Transportation (Non-Medical): No   Utilities: Not on file       DISCHARGE DETAILS:    Discharge planning discussed with:: patient  Freedom of Choice: Yes  Comments - Freedom of Choice: FOC reviewed, no current post acute recs  CM contacted family/caregiver?: No- see comments (pt AAO)  Were Treatment Team discharge recommendations reviewed with patient/caregiver?: Yes  Did  patient/caregiver verbalize understanding of patient care needs?: Yes  Were patient/caregiver advised of the risks associated with not following Treatment Team discharge recommendations?: Yes    Contacts  Patient Contacts: MOther- Anya   Relationship to Patient:: Family  Contact Method: Phone  Phone Number: [phone]  Reason/Outcome: Emergency Contact    Requested Home Health Care         Is the patient interested in HHC at discharge?: No          CM introduced self and role to patient at bedside. Patient reports she resides alone in [place] ( [address]). Does not use DME, but owns a cane/ walker if needed. Reports hx of STR, HHC ( [facility]) and OPPT. No current services.   Denies hx of SUB/ MH.   Does not drive, utilizes public transportation/ [employer] and will require transport home upon d/c.   Will follow for recs.     Patient/caregiver received discharge checklist.  Content reviewed.  Patient/caregiver encouraged to participate in discharge plan of care prior to discharge home. CM reviewed d/c planning process including the following: identifying help at home, patient preference for d/c planning needs, Discharge Lounge, [facility] Meds to Bed program, availability of treatment team to discuss questions or concerns patient and/or family may have regarding understanding medications and recognizing signs and symptoms once discharged.  CM also encouraged patient to follow up with all recommended appointments after discharge. Patient advised of importance for patient and family to participate in managing patient’s medical well being.

## 2024-01-15 ENCOUNTER — TRANSITIONAL CARE MANAGEMENT (OUTPATIENT)
Dept: INTERNAL MEDICINE CLINIC | Facility: CLINIC | Age: 64
End: 2024-01-15

## 2024-01-15 ENCOUNTER — TELEPHONE (OUTPATIENT)
Age: 64
End: 2024-01-15

## 2024-01-15 NOTE — TELEPHONE ENCOUNTER
Patient called in to schedule her TCM appt. Patient was warm transferred to [facility] for further assistance.

## 2024-01-17 ENCOUNTER — OFFICE VISIT (OUTPATIENT)
Dept: INTERNAL MEDICINE CLINIC | Facility: CLINIC | Age: 64
End: 2024-01-17
Payer: COMMERCIAL

## 2024-01-17 VITALS
BODY MASS INDEX: 34.04 KG/M2 | RESPIRATION RATE: 16 BRPM | DIASTOLIC BLOOD PRESSURE: 74 MMHG | OXYGEN SATURATION: 95 % | WEIGHT: 185 LBS | HEIGHT: 62 IN | HEART RATE: 89 BPM | SYSTOLIC BLOOD PRESSURE: 124 MMHG

## 2024-01-17 DIAGNOSIS — L97.529: ICD-10-CM

## 2024-01-17 DIAGNOSIS — D70.9 NEUTROPENIA, UNSPECIFIED TYPE (HCC): ICD-10-CM

## 2024-01-17 DIAGNOSIS — R51.9 NONINTRACTABLE HEADACHE, UNSPECIFIED CHRONICITY PATTERN, UNSPECIFIED HEADACHE TYPE: ICD-10-CM

## 2024-01-17 DIAGNOSIS — R42 DIZZINESS: ICD-10-CM

## 2024-01-17 DIAGNOSIS — E87.1 HYPONATREMIA: Primary | ICD-10-CM

## 2024-01-17 DIAGNOSIS — M86.9 OSTEOMYELITIS, UNSPECIFIED SITE, UNSPECIFIED TYPE (HCC): ICD-10-CM

## 2024-01-17 DIAGNOSIS — J96.01 ACUTE RESPIRATORY FAILURE WITH HYPOXIA (HCC): ICD-10-CM

## 2024-01-17 DIAGNOSIS — Z12.31 ENCOUNTER FOR SCREENING MAMMOGRAM FOR BREAST CANCER: ICD-10-CM

## 2024-01-17 DIAGNOSIS — Z89.429 ACQUIRED ABSENCE OF OTHER TOE(S), UNSPECIFIED SIDE (HCC): ICD-10-CM

## 2024-01-17 DIAGNOSIS — N39.3 STRESS INCONTINENCE: ICD-10-CM

## 2024-01-17 PROCEDURE — 99495 TRANSJ CARE MGMT MOD F2F 14D: CPT | Performed by: INTERNAL MEDICINE

## 2024-01-17 NOTE — PROGRESS NOTES
Assessment & Plan     1. Hyponatremia  Assessment & Plan:  Treated with IV fluids.  Pt also saw nephrology.  Fluid restriction recommended on discharge 1.8-2 liters daily.  Pt had discussion about possible relation to psych meds.  Follow up psych..  Sodium most recently was 137      2. Encounter for screening mammogram for breast cancer  -     Mammo screening bilateral w 3d & cad; Future; Expected date: 01/17/2024    3. Acquired absence of other toe(s), unspecified side (HCC)  Assessment & Plan:  Amputation fourth toe left foot      4. Osteomyelitis, unspecified site, unspecified type (HCC)  Assessment & Plan:  Treated with amputation left toe forefoot      5. Neutropenia, unspecified type (HCC)  Assessment & Plan:  Mild, continue to monitor      6. Ulcer of toe, chronic, left, with unspecified severity (HCC)  Assessment & Plan:  Status post amputation fourth toe left foot      7. Acute respiratory failure with hypoxia (HCC)  Assessment & Plan:  I cannot find any history of this, possibly related to an old hospitalization in the past      8. Stress incontinence  Assessment & Plan:  Can try Kegels      9. Nonintractable headache, unspecified chronicity pattern, unspecified headache type  -     Ambulatory Referral to Physical Therapy; Future    10. Dizziness  -     Ambulatory Referral to Physical Therapy; Future        Depression Screening and Follow-up Plan: Patient was screened for depression during today's encounter. They screened negative with a PHQ-2 score of 0.      Subjective     Transitional Care Management Review:   Irene Plascencia is a 63 y.o. female here for TCM follow up.     During the TCM phone call patient stated:  TCM Call     Date and time call was made  1/15/2024 10:37 AM    Hospital care reviewed  Records not available    Patient was hospitialized at  [facility]    Date of Admission  01/11/24    Date of discharge  01/13/24    Diagnosis  Hyponatremia    Disposition  Home    Were the  patients medications reviewed and updated  No    Current Symptoms  Dizziness; Headache; Neausea    Dizziness severity  Severe; Moderate    Neausea severity  Mild    Fatigue severity  Severe    Quality Character  Lightheadedness    Episode pattern  Intermittent      TCM Call     Post hospital issues  None    Should patient be enrolled in anticoag monitoring?  No    Scheduled for follow up?  Yes    Not clinically warranted  Patient readmitted.    Patients specialists  Other (comment)    Other specialists names  John Araujo    Did you obtain your prescribed medications  Yes    Why were you unable to obtain your medications  need psych appt they will not get her in sooner than next week    Do you need help managing your prescriptions or medications  No    Is transportation to your appointment needed  No    I have advised the patient to call PCP with any new or worsening symptoms  Lin Mae M.A    Are you recieving any outpatient services  No    Are you recieving home care services  No    Types of home care services  Nurse visit    Are you using any community resources  No    Current waiver services  No    Have you fallen in the last 12 months  No    How many times  2-3    Interperter language line needed  No    Counseling  Patient        I reviewed pt's hospitalization.  Pt was worried about possible COVID after potential exposure.  She was found to have low sodium.  Since home, patient has been feeling okay      Review of Systems   Constitutional:  Negative for chills, fatigue and fever.   HENT:  Negative for congestion, nosebleeds, postnasal drip, sore throat and trouble swallowing.    Eyes:  Negative for pain.   Respiratory:  Negative for cough, chest tightness, shortness of breath and wheezing.    Cardiovascular:  Negative for chest pain, palpitations and leg swelling.   Gastrointestinal:  Negative for abdominal pain, constipation, diarrhea, nausea and vomiting.   Endocrine: Negative for polydipsia and  "polyuria.   Genitourinary:  Negative for dysuria, flank pain and hematuria.   Musculoskeletal:  Negative for arthralgias.   Skin:  Negative for rash.   Neurological:  Negative for dizziness, tremors and headaches.   Hematological:  Does not bruise/bleed easily.   Psychiatric/Behavioral:  Negative for confusion and dysphoric mood. The patient is not nervous/anxious.        Objective     /74   Pulse 89   Resp 16   Ht 5' 2\" (1.575 m)   Wt 83.9 kg (185 lb)   SpO2 95%   BMI 33.84 kg/m²      Physical Exam  Vitals reviewed.   Constitutional:       Appearance: Normal appearance. She is well-developed.   HENT:      Head: Normocephalic and atraumatic.      Right Ear: External ear normal.      Left Ear: External ear normal.      Nose: Nose normal.   Eyes:      General: No scleral icterus.     Conjunctiva/sclera: Conjunctivae normal.   Neck:      Thyroid: No thyromegaly.      Trachea: No tracheal deviation.   Cardiovascular:      Rate and Rhythm: Normal rate and regular rhythm.      Heart sounds: Normal heart sounds. No murmur heard.  Pulmonary:      Effort: No respiratory distress.      Breath sounds: Normal breath sounds. No wheezing or rales.   Musculoskeletal:      Cervical back: Normal range of motion and neck supple.      Right lower leg: No edema.      Left lower leg: No edema.   Lymphadenopathy:      Cervical: No cervical adenopathy.   Skin:     Coloration: Skin is not jaundiced or pale.   Neurological:      Mental Status: She is alert and oriented to person, place, and time.   Psychiatric:         Behavior: Behavior normal.         Thought Content: Thought content normal.         Judgment: Judgment normal.       Medications have been reviewed by provider in current encounter    Raheem Cain MD         "

## 2024-01-17 NOTE — ASSESSMENT & PLAN NOTE
Treated with IV fluids.  Pt also saw nephrology.  Fluid restriction recommended on discharge 1.8-2 liters daily.  Pt had discussion about possible relation to psych meds.  Follow up psych..  Sodium most recently was 137

## 2024-01-17 NOTE — PATIENT INSTRUCTIONS
Problem List Items Addressed This Visit          Respiratory    Acute respiratory failure with hypoxia (HCC)     I cannot find any history of this, possibly related to an old hospitalization in the past            Musculoskeletal and Integument    Osteomyelitis, unspecified site, unspecified type (HCC)     Treated with amputation left toe forefoot            Other    Hyponatremia - Primary     Treated with IV fluids.  Pt also saw nephrology.  Fluid restriction recommended on discharge 1.8-2 liters daily.  Pt had discussion about possible relation to psych meds.  Follow up psych..  Sodium most recently was 137         Stress incontinence     Can try Kegels         Ulcer of toe, chronic, left, with unspecified severity (HCC)     Status post amputation fourth toe left foot         Headache    Relevant Orders    Ambulatory Referral to Physical Therapy    Neutropenia (HCC)     Mild, continue to monitor         Acquired absence of other toe(s), unspecified side (HCC)     Amputation fourth toe left foot          Other Visit Diagnoses       Encounter for screening mammogram for breast cancer        Relevant Orders    Mammo screening bilateral w 3d & cad    Dizziness        Relevant Orders    Ambulatory Referral to Physical Therapy

## 2024-01-18 ENCOUNTER — OFFICE VISIT (OUTPATIENT)
Dept: NEUROLOGY | Facility: CLINIC | Age: 64
End: 2024-01-18
Payer: COMMERCIAL

## 2024-01-18 VITALS
BODY MASS INDEX: 34.58 KG/M2 | WEIGHT: 187.9 LBS | DIASTOLIC BLOOD PRESSURE: 82 MMHG | HEIGHT: 62 IN | SYSTOLIC BLOOD PRESSURE: 136 MMHG | HEART RATE: 94 BPM | OXYGEN SATURATION: 96 % | TEMPERATURE: 98 F

## 2024-01-18 DIAGNOSIS — H81.10 BPPV (BENIGN PAROXYSMAL POSITIONAL VERTIGO): ICD-10-CM

## 2024-01-18 DIAGNOSIS — F32.A ANXIETY AND DEPRESSION: ICD-10-CM

## 2024-01-18 DIAGNOSIS — S06.0X0A CONCUSSION WITHOUT LOSS OF CONSCIOUSNESS, INITIAL ENCOUNTER: ICD-10-CM

## 2024-01-18 DIAGNOSIS — E66.9 OBESITY (BMI 30-39.9): ICD-10-CM

## 2024-01-18 DIAGNOSIS — G44.309 POST-TRAUMATIC HEADACHE: Primary | ICD-10-CM

## 2024-01-18 DIAGNOSIS — F41.9 ANXIETY AND DEPRESSION: ICD-10-CM

## 2024-01-18 DIAGNOSIS — R41.3 MEMORY PROBLEM: ICD-10-CM

## 2024-01-18 PROCEDURE — 99214 OFFICE O/P EST MOD 30 MIN: CPT | Performed by: STUDENT IN AN ORGANIZED HEALTH CARE EDUCATION/TRAINING PROGRAM

## 2024-01-18 RX ORDER — TOPIRAMATE 25 MG/1
TABLET ORAL
Qty: 120 TABLET | Refills: 5 | Status: SHIPPED | OUTPATIENT
Start: 2024-01-18

## 2024-01-18 NOTE — PATIENT INSTRUCTIONS
[facility]: [phone]  [facility]: [phone]  Dr. David White: [phone]  [facility]: [phone]  [facility]: [phone]    Headache Calendar  Please maintain a headache calendar  Consider using phone applications such as Migraine Skip or [employer] Migraine Tracker     Headache/migraine treatment:   Acute medications (for immediate treatment of a headache):   It is ok to take acetaminophen (Tylenol) if they help your headaches you should limit these to No more than 2-3 times a week to avoid medication overuse/rebound headaches.      Prescription preventive medications for headaches/migraines   (to take every day to help prevent headaches - not to take at the time of headache):  Topiramate 25 mg nightly for 1 week, then increase to 25 mg in a.m. And 25 mg in p.m. For 1 week, then take 25 mg in a.m. And 50 mg in p.m. For 1 week, then take 50 mg in a.m. and 50 mg in p.m. And continue  - generally the common side effects improve as your body gets used to the medication.  If we need to spread out a more gradual increase of the medication on a longer scale we can, just call if any questions or concerns  - if necessary, if the a.m. dose is causing side effects we can always have you take the full dose at night instead     *Typically these types of medications take time until you see the benefit, although some may see improvement in days, often it may take weeks, especially if the medication is being titrated up to a beneficial level. Please contact us if there are any concerns or questions regarding the medication.      Lifestyle Recommendations:  [x] SLEEP - Maintain a regular sleep schedule: Adults need at least 7-8 hours of uninterrupted a night. Maintain good sleep hygiene:  Going to bed and waking up at consistent times, avoiding excessive daytime naps,  avoiding caffeinated beverages in the evening, avoid excessive stimulation in the evening and generally using bed primarily for sleeping.  One hour before bedtime would recommend turning lights down lower, decreasing your activity (may read quietly, listen to music at a low volume). When you get into bed, should eliminate all technology (no texting, emailing, playing with your phone, iPad or tablet in bed).  [x] HYDRATION - Maintain good hydration.  Drink  2L of fluid a day (4 typical small water bottles)  [x] DIET - Maintain good nutrition. In particular don't skip meals and try and eat healthy balanced meals regularly.  [x] TRIGGERS - Look for other triggers and avoid them: Limit caffeine to 1-2 cups a day or less. Avoid dietary triggers that you have noticed bring on your headaches (this could include aged cheese, peanuts, MSG, aspartame and nitrates).  [x] EXERCISE - physical exercise as we all know is good for you in many ways, and not only is good for your heart, but also is beneficial for your mental health, cognitive health and  chronic pain/headaches. I would encourage at the least 5 days of physical exercise weekly for at least 30 minutes.      Education and Follow-up  [x] Please call with any questions or concerns. Of course if any new concerning symptoms go to the emergency department.  [x] Follow up in 4 months
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
## 2024-01-18 NOTE — PROGRESS NOTES
Review of Systems   Constitutional:  Negative for appetite change, fatigue and fever.   HENT: Negative.  Negative for hearing loss, tinnitus, trouble swallowing and voice change.    Eyes:  Positive for visual disturbance (blurry vision). Negative for photophobia and pain.   Respiratory: Negative.  Negative for shortness of breath.    Cardiovascular: Negative.  Negative for palpitations.   Gastrointestinal:  Positive for nausea. Negative for vomiting.   Endocrine: Negative.  Negative for cold intolerance.   Genitourinary: Negative.  Negative for dysuria, frequency and urgency.   Musculoskeletal:  Negative for back pain, gait problem, myalgias, neck pain and neck stiffness.   Skin: Negative.  Negative for rash.   Allergic/Immunologic: Negative.    Neurological:  Positive for dizziness and headaches. Negative for tremors, seizures, syncope, facial asymmetry, speech difficulty, weakness, light-headedness and numbness.   Hematological: Negative.  Does not bruise/bleed easily.   Psychiatric/Behavioral: Negative.  Negative for confusion, hallucinations and sleep disturbance.    All other systems reviewed and are negative.    Since your last visit are your headaches Improved    Any change to the headache type? no    What is your current headache frequency: daily when waking up in morning     Are you taking your current medications as prescribed? yes    Do you have any side effects? no    How may days per week do you take an abortive medicine? 0/7

## 2024-01-24 ENCOUNTER — PATIENT OUTREACH (OUTPATIENT)
Dept: INTERNAL MEDICINE CLINIC | Facility: CLINIC | Age: 64
End: 2024-01-24

## 2024-01-24 ENCOUNTER — TELEPHONE (OUTPATIENT)
Age: 64
End: 2024-01-24

## 2024-01-24 DIAGNOSIS — Z59.9 FINANCIAL DIFFICULTIES: Primary | ICD-10-CM

## 2024-01-24 SDOH — ECONOMIC STABILITY - INCOME SECURITY: PROBLEM RELATED TO HOUSING AND ECONOMIC CIRCUMSTANCES, UNSPECIFIED: Z59.9

## 2024-01-24 NOTE — PROGRESS NOTES
Call received from patient requesting assistance as she cannot afford her house or bills.  Patient lives alone in her mother's home.  Patient's mother is in SNF for long-term care and has guardian through the state.  Patient states her mother's  has not followed up on promises made regarding MA for her mother and transferring house to patient's name & putting in trust.  Encouraged patient to continue communication with .  AAA was involved as well but patient states she does not trust them.    Patient states home is paid off (no rent or mortgage) but she is interested in transitioning to senior/public apartments.  She has been on waitlist for 5 years with [facility] and would like to move in here.  OP SWCM unable to change patient's waitlist status.  Encouraged patient to continue communication with this housing building.  Patient asked to review list of other housing options.  List emailed to patient to review.  Will assist with applications pending patient's interest in alternate locations.    Patient states bills are up to date but cable through [employer], heat, and [employer] bills are challenging to pay.  Patient has LIHEAP; heat source is oil through [employer].  Encouraged patient to outreach [employer] when at 1/4 tank of oil and request assistance with oil fill through [employer].  Patient agreed.  Will refer to [facility] bhavesh for assistance with ACP through [employer] but informed patient this program is ending.  Patient understanding.  Patient has [employer] and receives SNAP benefits.  Her income is $1,100/month through [employer].    Will follow.

## 2024-01-26 ENCOUNTER — TELEPHONE (OUTPATIENT)
Age: 64
End: 2024-01-26

## 2024-01-26 ENCOUNTER — PATIENT OUTREACH (OUTPATIENT)
Dept: INTERNAL MEDICINE CLINIC | Facility: CLINIC | Age: 64
End: 2024-01-26

## 2024-01-26 NOTE — PROGRESS NOTES
received referral from [facility] to outreach patient for utility and housing assistance.     CMOC called patient introduced myself and role.     CMOC asked utilities patient needed assistance for. Patient stated [employer], [employer], Property taxes, PPL, heating fuel.     CMOC asked patient if patient has LIHEAP and patient stated yes, but that's only $300 going towards PPL bill.     CMOC asked if electric heat was main source of heat and it is not. CMOC instructed patient to call [employer] at [phone] or [phone] and ask for Crisis Funds be paid directly to fuel oil provider. CMOC instructed patient that if that does not work patient to call [place] [employer] at [phone] and ask for the Emergency Heating Program that [facility] partners with [employer] on.     CMOC explained that an AAA  would do a home assessment and if funding is still available patient could receive a one time fuel delivery up to $780.00 as long as LIHEAP and Crisis funds are exhausted.     CMOC explained to patient that there are not assistance programs for Property tax and to call billing office for property tax and ask for a payment plan.     CMOC explained that [employer] is only for Internet not cable. Patient asked if information could be emailed.     CMOC will email link to patient   Free Internet with the [employer] and LUANN Ingram     CMOC and patient discussed housing and waiting lists. Patient advised wants to live at [facility] on [address] [place] [address] in [place] and has been on waitlist for 5 years and it could be at least 2 to 3 more years. CMOC let patient know that there are waiting lists for all housing some are longer than what patient has been on. CMOC asked if there is a certain area patient wants to be and patient stated [facility].     Patient also asked that those be emailed.     CMOC advised will email what applications I can or will provide links to online  applications.     Patient is very appreciative for reaching out and agreeable to plan.     CMOC advised will out reach next week to see if patient has any questions on information sent.     Next Outreach 2/1

## 2024-01-26 NOTE — TELEPHONE ENCOUNTER
The patient call to report that she delete by mistake the email that suhas sent to her . Can you please re send

## 2024-01-31 DIAGNOSIS — E66.9 OBESITY (BMI 30-39.9): ICD-10-CM

## 2024-01-31 DIAGNOSIS — G44.309 POST-TRAUMATIC HEADACHE: ICD-10-CM

## 2024-02-01 ENCOUNTER — PATIENT OUTREACH (OUTPATIENT)
Dept: INTERNAL MEDICINE CLINIC | Facility: CLINIC | Age: 64
End: 2024-02-01

## 2024-02-01 DIAGNOSIS — G44.309 POST-TRAUMATIC HEADACHE: ICD-10-CM

## 2024-02-01 DIAGNOSIS — E66.9 OBESITY (BMI 30-39.9): ICD-10-CM

## 2024-02-01 RX ORDER — TOPIRAMATE 25 MG/1
TABLET ORAL
Qty: 360 TABLET | Refills: 2 | Status: SHIPPED | OUTPATIENT
Start: 2024-02-01 | End: 2024-02-01

## 2024-02-01 RX ORDER — TOPIRAMATE 50 MG/1
TABLET, FILM COATED ORAL
Qty: 180 TABLET | Refills: 3 | Status: SHIPPED | OUTPATIENT
Start: 2024-02-01

## 2024-02-01 NOTE — PROGRESS NOTES
called patient to check and see if patient received email from [facility] with housing applications.     Patient advised did receive it and patient want to hold out for [facility] which patient has been on waitlist for 5 years.     Patient did call crisis and received a $700 oil fill.     There is no further outreach needed by OC.     Will route note to ASIM Hodge and close on CMOC's end.

## 2024-02-03 DIAGNOSIS — E03.9 ACQUIRED HYPOTHYROIDISM: ICD-10-CM

## 2024-02-03 DIAGNOSIS — I10 HYPERTENSION: ICD-10-CM

## 2024-02-03 RX ORDER — AMLODIPINE BESYLATE 5 MG/1
5 TABLET ORAL DAILY
Qty: 90 TABLET | Refills: 3 | Status: SHIPPED | OUTPATIENT
Start: 2024-02-03

## 2024-02-03 RX ORDER — LEVOTHYROXINE SODIUM 0.03 MG/1
TABLET ORAL
Qty: 90 TABLET | Refills: 3 | Status: SHIPPED | OUTPATIENT
Start: 2024-02-03

## 2024-02-05 ENCOUNTER — NURSE TRIAGE (OUTPATIENT)
Age: 64
End: 2024-02-05

## 2024-02-05 NOTE — TELEPHONE ENCOUNTER
"Patient called in to report vomiting overnight Saturday and Sunday and every time she coughed through the day; diarrhea with every void, and constant severe epigastric pain. Patient has history of Schreiber's esophagus. Drinking red Powerade; all emesis is red where patient could not distinguish if there was blood in her emesis. RN advised patient to go to ER; patient agreed to call 911. Placed on [place] ER board.    Reason for Disposition   Constant abdominal pain lasting > 2 hours    Answer Assessment - Initial Assessment Questions  1. VOMITING SEVERITY: \"How many times have you vomited in the past 24 hours?\"      - MILD:  1 - 2 times/day     - MODERATE: 3 - 5 times/day, decreased oral intake without significant weight loss or symptoms of dehydration     - SEVERE: 6 or more times/day, vomits everything or nearly everything, with significant weight loss, symptoms of dehydration       None today; Saturday and Sunday vomited overnight; vomits with coughing, Tolerated Cream of wheat this morning; no other solid food today  2. ONSET: \"When did the vomiting begin?\"       Off and on for the past couple days since Friday 2/2  3. FLUIDS: \"What fluids or food have you vomited up today?\" \"Have you been able to keep any fluids down?\"      Yes tolerating fluids today  4. ABDOMINAL PAIN: \"Are your having any abdominal pain?\" If yes : \"How bad is it and what does it feel like?\" (e.g., crampy, dull, intermittent, constant)       Severe (9)  5. DIARRHEA: \"Is there any diarrhea?\" If Yes, ask: \"How many times today?\"       Constipation last week; Ducolax 1/30-1/31/24; loose stool at same time as urination.  6. CONTACTS: \"Is there anyone else in the family with the same symptoms?\"       Denies; lives alone  7. CAUSE: \"What do you think is causing your vomiting?\"      Possible ulcer per patient; has Schreiber's esophagus; hernia procedure 2 years ago; Tif procedure not successful  8. HYDRATION STATUS: \"Any signs of dehydration?\" (e.g., " "dry mouth [not only dry lips], too weak to stand) \"When did you last urinate?\"      Mouth is dry; lips are soft to dry, weakness with standing; last urination was 2 hours ago was normal clear  9. OTHER SYMPTOMS: \"Do you have any other symptoms?\" (e.g., fever, headache, vertigo, vomiting blood or coffee grounds, recent head injury)      Piercing pain in her epigastric area; drinking red Powerade so emesis is red in colo; still in treatment for concussion July 2023 with ongoing therapy  10. PREGNANCY: \"Is there any chance you are pregnant?\" \"When was your last menstrual period?\"        Post menopausal    Protocols used: Vomiting-ADULT-OH    "

## 2024-02-06 ENCOUNTER — HOSPITAL ENCOUNTER (EMERGENCY)
Facility: HOSPITAL | Age: 64
Discharge: HOME | End: 2024-02-06
Attending: EMERGENCY MEDICINE
Payer: COMMERCIAL

## 2024-02-06 ENCOUNTER — PATIENT OUTREACH (OUTPATIENT)
Dept: INTERNAL MEDICINE CLINIC | Facility: CLINIC | Age: 64
End: 2024-02-06

## 2024-02-06 ENCOUNTER — APPOINTMENT (EMERGENCY)
Dept: RADIOLOGY | Facility: HOSPITAL | Age: 64
End: 2024-02-06
Payer: COMMERCIAL

## 2024-02-06 VITALS
HEART RATE: 98 BPM | DIASTOLIC BLOOD PRESSURE: 65 MMHG | OXYGEN SATURATION: 98 % | RESPIRATION RATE: 18 BRPM | TEMPERATURE: 98.5 F | SYSTOLIC BLOOD PRESSURE: 125 MMHG

## 2024-02-06 DIAGNOSIS — R10.9 ABDOMINAL PAIN: Primary | ICD-10-CM

## 2024-02-06 DIAGNOSIS — K44.9 HIATAL HERNIA: ICD-10-CM

## 2024-02-06 DIAGNOSIS — R11.10 VOMITING: ICD-10-CM

## 2024-02-06 LAB
ALBUMIN SERPL BCP-MCNC: 3.8 G/DL (ref 3.5–5)
ALP SERPL-CCNC: 68 U/L (ref 34–104)
ALT SERPL W P-5'-P-CCNC: 15 U/L (ref 7–52)
ANION GAP SERPL CALCULATED.3IONS-SCNC: 6 MMOL/L
AST SERPL W P-5'-P-CCNC: 14 U/L (ref 13–39)
BASOPHILS # BLD AUTO: 0.02 THOUSANDS/ÂΜL (ref 0–0.1)
BASOPHILS NFR BLD AUTO: 1 % (ref 0–1)
BILIRUB SERPL-MCNC: 0.28 MG/DL (ref 0.2–1)
BUN SERPL-MCNC: 9 MG/DL (ref 5–25)
CALCIUM SERPL-MCNC: 8.9 MG/DL (ref 8.4–10.2)
CHLORIDE SERPL-SCNC: 96 MMOL/L (ref 96–108)
CO2 SERPL-SCNC: 29 MMOL/L (ref 21–32)
CREAT SERPL-MCNC: 0.59 MG/DL (ref 0.6–1.3)
EOSINOPHIL # BLD AUTO: 0.17 THOUSAND/ÂΜL (ref 0–0.61)
EOSINOPHIL NFR BLD AUTO: 6 % (ref 0–6)
ERYTHROCYTE [DISTWIDTH] IN BLOOD BY AUTOMATED COUNT: 13.7 % (ref 11.6–15.1)
GFR SERPL CREATININE-BSD FRML MDRD: 97 ML/MIN/1.73SQ M
GLUCOSE SERPL-MCNC: 81 MG/DL (ref 65–140)
HCT VFR BLD AUTO: 32.2 % (ref 34.8–46.1)
HGB BLD-MCNC: 10.9 G/DL (ref 11.5–15.4)
IMM GRANULOCYTES # BLD AUTO: 0.01 THOUSAND/UL (ref 0–0.2)
IMM GRANULOCYTES NFR BLD AUTO: 0 % (ref 0–2)
LIPASE SERPL-CCNC: 18 U/L (ref 11–82)
LYMPHOCYTES # BLD AUTO: 0.52 THOUSANDS/ÂΜL (ref 0.6–4.47)
LYMPHOCYTES NFR BLD AUTO: 17 % (ref 14–44)
MCH RBC QN AUTO: 29.2 PG (ref 26.8–34.3)
MCHC RBC AUTO-ENTMCNC: 33.9 G/DL (ref 31.4–37.4)
MCV RBC AUTO: 86 FL (ref 82–98)
MONOCYTES # BLD AUTO: 0.29 THOUSAND/ÂΜL (ref 0.17–1.22)
MONOCYTES NFR BLD AUTO: 10 % (ref 4–12)
NEUTROPHILS # BLD AUTO: 1.99 THOUSANDS/ÂΜL (ref 1.85–7.62)
NEUTS SEG NFR BLD AUTO: 66 % (ref 43–75)
NRBC BLD AUTO-RTO: 0 /100 WBCS
PLATELET # BLD AUTO: 207 THOUSANDS/UL (ref 149–390)
PMV BLD AUTO: 8.6 FL (ref 8.9–12.7)
POTASSIUM SERPL-SCNC: 3.6 MMOL/L (ref 3.5–5.3)
PROT SERPL-MCNC: 6.2 G/DL (ref 6.4–8.4)
RBC # BLD AUTO: 3.73 MILLION/UL (ref 3.81–5.12)
SODIUM SERPL-SCNC: 131 MMOL/L (ref 135–147)
WBC # BLD AUTO: 3 THOUSAND/UL (ref 4.31–10.16)

## 2024-02-06 PROCEDURE — 83690 ASSAY OF LIPASE: CPT

## 2024-02-06 PROCEDURE — 85025 COMPLETE CBC W/AUTO DIFF WBC: CPT

## 2024-02-06 PROCEDURE — 96374 THER/PROPH/DIAG INJ IV PUSH: CPT

## 2024-02-06 PROCEDURE — 99285 EMERGENCY DEPT VISIT HI MDM: CPT | Performed by: EMERGENCY MEDICINE

## 2024-02-06 PROCEDURE — 74177 CT ABD & PELVIS W/CONTRAST: CPT

## 2024-02-06 PROCEDURE — 36415 COLL VENOUS BLD VENIPUNCTURE: CPT

## 2024-02-06 PROCEDURE — 99285 EMERGENCY DEPT VISIT HI MDM: CPT

## 2024-02-06 PROCEDURE — G1004 CDSM NDSC: HCPCS

## 2024-02-06 PROCEDURE — 80053 COMPREHEN METABOLIC PANEL: CPT

## 2024-02-06 RX ORDER — MAGNESIUM HYDROXIDE/ALUMINUM HYDROXICE/SIMETHICONE 120; 1200; 1200 MG/30ML; MG/30ML; MG/30ML
30 SUSPENSION ORAL ONCE
Status: COMPLETED | OUTPATIENT
Start: 2024-02-06 | End: 2024-02-06

## 2024-02-06 RX ORDER — ONDANSETRON 2 MG/ML
4 INJECTION INTRAMUSCULAR; INTRAVENOUS ONCE
Status: COMPLETED | OUTPATIENT
Start: 2024-02-06 | End: 2024-02-06

## 2024-02-06 RX ORDER — ONDANSETRON 4 MG/1
4 TABLET, FILM COATED ORAL EVERY 6 HOURS
Qty: 12 TABLET | Refills: 0 | Status: SHIPPED | OUTPATIENT
Start: 2024-02-06

## 2024-02-06 RX ORDER — LIDOCAINE HYDROCHLORIDE 20 MG/ML
15 SOLUTION OROPHARYNGEAL ONCE
Status: COMPLETED | OUTPATIENT
Start: 2024-02-06 | End: 2024-02-06

## 2024-02-06 RX ADMIN — IOHEXOL 100 ML: 350 INJECTION, SOLUTION INTRAVENOUS at 08:38

## 2024-02-06 RX ADMIN — LIDOCAINE HYDROCHLORIDE 15 ML: 20 SOLUTION ORAL; TOPICAL at 07:36

## 2024-02-06 RX ADMIN — ONDANSETRON 4 MG: 2 INJECTION INTRAMUSCULAR; INTRAVENOUS at 07:36

## 2024-02-06 RX ADMIN — ALUMINUM HYDROXIDE, MAGNESIUM HYDROXIDE, AND SIMETHICONE 30 ML: 200; 200; 20 SUSPENSION ORAL at 07:36

## 2024-02-06 NOTE — ED NOTES
Patient reports acid reflux typically relieved with medication      Maddison Alonso RN  02/06/24 0700

## 2024-02-06 NOTE — PROGRESS NOTES
ADT Notification received.  Patient presented to ED for abdominal pain and vomiting.  Will follow.

## 2024-02-06 NOTE — DISCHARGE INSTRUCTIONS
A referral was provided for a GI doctor.    Prescriptions resent to the pharmacy for a nausea medication and a medication to help with reflux discomfort.    Return to the ER if symptoms worsen or if you have any other concerns.

## 2024-02-06 NOTE — ED ATTENDING ATTESTATION
2/6/2024  I, Lissett Trejo DO, saw and evaluated the patient. I have discussed the patient with the resident/non-physician practitioner and agree with the resident's/non-physician practitioner's findings, Plan of Care, and MDM as documented in the resident's/non-physician practitioner's note, except where noted. All available labs and Radiology studies were reviewed.  I was present for key portions of any procedure(s) performed by the resident/non-physician practitioner and I was immediately available to provide assistance.       At this point I agree with the current assessment done in the Emergency Department.  I have conducted an independent evaluation of this patient a history and physical is as follows:    63-year-old female presents with abdominal pain and vomiting for the past 4 days.  Patient reports the pain is in her upper abdomen.  Says it might be similar to when she had an ulcer in the past.  Denies diarrhea, no fevers.  On exam-no acute distress, heart regular, no respiratory distress, abdomen soft with tenderness in the upper abdomen.  Plan-high risk for pancreatitis versus gallbladder disease versus gastritis or GERD.  Will check abdominal labs, CT abdomen and reassess    ED Course         Critical Care Time  Procedures

## 2024-02-06 NOTE — ED PROVIDER NOTES
History  Chief Complaint   Patient presents with    Abdominal Pain     Pt arrives VIA EMS with abdominal pain x4 days. Seen recently for the same      HPI  Irene Plascencia is a 63 y.o. female with PMH schizoaffective, chronic hyponatremia, GERD who presents to the emergency department with abdominal pain and vomiting for the past 4 days.  She states the pain has been constant in the central abdomen and is not exacerbated by food.  She denies fevers or dysuria.  She had a normal bowel movement yesterday.    Prior to Admission Medications   Prescriptions Last Dose Informant Patient Reported? Taking?   [employer] Vitamin B-12 1000 MCG tablet  Self No No   Sig: TAKE 1 TABLET BY MOUTH EVERY DAY   LORazepam (ATIVAN) 2 mg tablet  Self No No   Sig: Take 1 tablet (2 mg total) by mouth every 6 (six) hours as needed for anxiety   PARoxetine (PAXIL) 30 mg tablet  Self No No   Sig: Take 2 tablets (60 mg total) by mouth in the morning   QUEtiapine (SEROquel XR) 400 mg 24 hr tablet  Self No No   Sig: Take 1 tablet (400 mg total) by mouth daily at bedtime   amLODIPine (NORVASC) 5 mg tablet   No No   Sig: TAKE 1 TABLET (5 MG TOTAL) BY MOUTH DAILY.   benzonatate (TESSALON PERLES) 100 mg capsule  Self No No   Sig: Take 1 capsule (100 mg total) by mouth 3 (three) times a day as needed for cough   Patient not taking: Reported on 1/11/2024   celecoxib (CeleBREX) 200 mg capsule  Self No No   Sig: Take 1 capsule (200 mg total) by mouth daily   ferrous sulfate 325 (65 Fe) mg tablet  Self No No   Sig: Take 1 tablet (325 mg total) by mouth 2 (two) times a day with meals   Patient not taking: Reported on 1/11/2024   folic acid (FOLVITE) 400 mcg tablet  Self No No   Sig: Take 1 tablet (400 mcg total) by mouth daily   Patient not taking: Reported on 1/11/2024   levothyroxine 25 mcg tablet   No No   Sig: TAKE 1 TABLET BY MOUTH EVERY DAY   pantoprazole (PROTONIX) 40 mg tablet  Self No No   Sig: TAKE 1 TABLET BY MOUTH TWICE A DAY   predniSONE 20 mg  tablet  Self Yes No   Sig: Take by mouth daily   Patient not taking: Reported on 2023   sucralfate (CARAFATE) 1 g tablet  Self No No   Sig: TAKE 1 TABLET (1 G TOTAL) BY MOUTH 3 (THREE) TIMES A DAY WITH MEALS   topiramate (Topamax) 50 MG tablet   No No   Si/2 TAB at bedtime for 1 week, increase to 1/2 TAB in AM and PM for 1 week, increase to 1/2 TAB in AM and 1 TAB in PM for 1 week, and finish at 1 TAB in AM and PM   triamcinolone (KENALOG) 0.1 % cream  Self No No   Sig: APPLY TOPICALLY 2 TIMES A DAY AS NEEDED FOR RASH.   ziprasidone (GEODON) 80 mg capsule  Self No No   Sig: TAKE 1 CAPSULE BY MOUTH EVERY DAY      Facility-Administered Medications: None       Past Medical History:   Diagnosis Date    Anemia     Anxiety     Arthritis     Back pain at L4-L5 level     Schreiber esophagus     Bulimia     Colon polyp     Disease of thyroid gland     hypothyroid    GERD (gastroesophageal reflux disease)     Hearing loss in left ear     History of transfusion     Hyperlipidemia     Hypertension     Hypothyroidism     Leukopenia     NMS (neuroleptic malignant syndrome) 2020    Pneumonia     RA (rheumatoid arthritis) (HCC)     in feet    Sciatica     Seizure (HCC)     due to medication mix up 2018 only one historically    Skin spots, red     lower right arm - poss from cat- no s/s infection    Synovial cyst of lumbar spine     Vertigo     Wears dentures     full set    Weight gain        Past Surgical History:   Procedure Laterality Date    BONE MARROW BIOPSY      BREAST SURGERY      enlargement with implants    CLOSED REDUCTION DISTAL FEMUR FRACTURE      COLONOSCOPY      COSMETIC SURGERY      DENTAL SURGERY      HIP ARTHROPLASTY Right 2020    Procedure: REVISION TOTAL HIP ARTHROPLASTY WITH REPAIR OF PARIPROSTHETIC FRACTURE - POSTERIOR APPROACH;  Surgeon: Dilan Pedersen MD;  Location: [facility];  Service: Orthopedics    TX AMPUTATION METATARSAL W/TOE SINGLE Left 2023    Procedure: AMPUTATION TOE  4th;  Surgeon: Conner Bartlett DPM;  Location:  MAIN OR;  Service: Podiatry    KS ARTHRP ACETBLR/PROX FEM PROSTC AGRFT/ALGRFT Right 12/11/2019    Procedure: ARTHROPLASTY HIP TOTAL ANTERIOR;  Surgeon: Dilan Pedersen MD;  Location:  MAIN OR;  Service: Orthopedics    KS ESOPHAGOGASTRODUODENOSCOPY TRANSORAL DIAGNOSTIC N/A 05/11/2021    Procedure: ESOPHAGOGASTRODUODENOSCOPY (EGD);  Surgeon: David Cedeño MD;  Location[facility];  Service: General    KS LAPS RPR PARAESPHGL HRNA INCL FUNDPLSTY W/MESH N/A 05/11/2021    Procedure: REPAIR HERNIA PARAESOPHAGEAL  LAPAROSCOPIC;  Surgeon: David Cedeño MD;  [facility];  Service: General    KS UNLISTED PROCEDURE ESOPHAGUS N/A 05/11/2021    Procedure: FUNDOPLICATION TRANSORAL INCISIONLESS;  Surgeon: Brad Cadet MD;  Location: [facility];  Service: Gastroenterology    RHINOPLASTY      SINUS SURGERY      TOE AMPUTATION Left     2023 4th toe    TRANSORAL INCISIONLESS FUNDOPLICATION (TIF)  05/11/2021       Family History   Problem Relation Age of Onset    Uterine cancer Mother     Esophageal cancer Father     Colon cancer Maternal Grandmother      I have reviewed and agree with the history as documented.    E-Cigarette/Vaping    E-Cigarette Use Never User      E-Cigarette/Vaping Substances    Nicotine No     THC No     CBD No     Flavoring No     Other No     Unknown No      Social History     Tobacco Use    Smoking status: Never    Smokeless tobacco: Never   Vaping Use    Vaping status: Never Used   Substance Use Topics    Alcohol use: Not Currently    Drug use: Not Currently       Home medications:  Prior to Admission Medications   Prescriptions Last Dose Informant Patient Reported? Taking?   [employer] Vitamin B-12 1000 MCG tablet  Self No No   Sig: TAKE 1 TABLET BY MOUTH EVERY DAY   LORazepam (ATIVAN) 2 mg tablet  Self No No   Sig: Take 1 tablet (2 mg total) by mouth every 6 (six) hours as needed for anxiety   PARoxetine (PAXIL) 30 mg tablet  Self No No   Sig: Take  2 tablets (60 mg total) by mouth in the morning   QUEtiapine (SEROquel XR) 400 mg 24 hr tablet  Self No No   Sig: Take 1 tablet (400 mg total) by mouth daily at bedtime   amLODIPine (NORVASC) 5 mg tablet   No No   Sig: TAKE 1 TABLET (5 MG TOTAL) BY MOUTH DAILY.   benzonatate (TESSALON PERLES) 100 mg capsule  Self No No   Sig: Take 1 capsule (100 mg total) by mouth 3 (three) times a day as needed for cough   Patient not taking: Reported on 2024   celecoxib (CeleBREX) 200 mg capsule  Self No No   Sig: Take 1 capsule (200 mg total) by mouth daily   ferrous sulfate 325 (65 Fe) mg tablet  Self No No   Sig: Take 1 tablet (325 mg total) by mouth 2 (two) times a day with meals   Patient not taking: Reported on 2024   folic acid (FOLVITE) 400 mcg tablet  Self No No   Sig: Take 1 tablet (400 mcg total) by mouth daily   Patient not taking: Reported on 2024   levothyroxine 25 mcg tablet   No No   Sig: TAKE 1 TABLET BY MOUTH EVERY DAY   pantoprazole (PROTONIX) 40 mg tablet  Self No No   Sig: TAKE 1 TABLET BY MOUTH TWICE A DAY   predniSONE 20 mg tablet  Self Yes No   Sig: Take by mouth daily   Patient not taking: Reported on 2023   sucralfate (CARAFATE) 1 g tablet  Self No No   Sig: TAKE 1 TABLET (1 G TOTAL) BY MOUTH 3 (THREE) TIMES A DAY WITH MEALS   topiramate (Topamax) 50 MG tablet   No No   Si/2 TAB at bedtime for 1 week, increase to 1/2 TAB in AM and PM for 1 week, increase to 1/2 TAB in AM and 1 TAB in PM for 1 week, and finish at 1 TAB in AM and PM   triamcinolone (KENALOG) 0.1 % cream  Self No No   Sig: APPLY TOPICALLY 2 TIMES A DAY AS NEEDED FOR RASH.   ziprasidone (GEODON) 80 mg capsule  Self No No   Sig: TAKE 1 CAPSULE BY MOUTH EVERY DAY      Facility-Administered Medications: None     Allergies:  Allergies   Allergen Reactions    Risperdal [Risperidone] Shortness Of Breath     Rapid heart beat, SOB    Zyprexa [Olanzapine] Shortness Of Breath     Rapid heartbeat    Latex Rash     Band aids,  adhesives wears normal underwear, can eat bananas  USE PAPER TAPE        Review of Systems   Constitutional:  Negative for fever.   Respiratory:  Negative for shortness of breath.    Cardiovascular:  Negative for chest pain.   Gastrointestinal:  Positive for abdominal pain, nausea and vomiting. Negative for constipation and diarrhea.   Genitourinary:  Negative for dysuria and hematuria.   All other systems reviewed and are negative.      Physical Exam  ED Triage Vitals [02/06/24 0647]   Temperature Pulse Respirations Blood Pressure SpO2   98.5 °F (36.9 °C) 98 18 125/65 98 %      Temp Source Heart Rate Source Patient Position - Orthostatic VS BP Location FiO2 (%)   Tympanic Monitor Sitting Left arm --      Pain Score       --             Orthostatic Vital Signs  Vitals:    02/06/24 0647   BP: 125/65   Pulse: 98   Patient Position - Orthostatic VS: Sitting       Physical Exam  Vitals and nursing note reviewed.   Constitutional:       General: She is not in acute distress.     Appearance: She is not toxic-appearing or diaphoretic.   HENT:      Head: Normocephalic.      Mouth/Throat:      Mouth: Mucous membranes are moist.   Eyes:      Pupils: Pupils are equal, round, and reactive to light.   Cardiovascular:      Rate and Rhythm: Normal rate and regular rhythm.      Heart sounds: No murmur heard.  Pulmonary:      Effort: Pulmonary effort is normal. No respiratory distress.      Breath sounds: Normal breath sounds. No wheezing, rhonchi or rales.   Abdominal:      General: Abdomen is flat. There is no distension.      Palpations: Abdomen is soft.      Tenderness: There is abdominal tenderness in the periumbilical area. There is no right CVA tenderness, left CVA tenderness, guarding or rebound. Negative signs include Lr's sign and McBurney's sign.   Musculoskeletal:      Right lower leg: No edema.      Left lower leg: No edema.   Skin:     General: Skin is warm and dry.   Neurological:      Mental Status: She is alert.          ED Medications  Medications   aluminum-magnesium hydroxide-simethicone (MAALOX) oral suspension 30 mL (30 mL Oral Given 2/6/24 0736)   Lidocaine Viscous HCl (XYLOCAINE) 2 % mucosal solution 15 mL (15 mL Swish & Spit Given 2/6/24 0736)   ondansetron (ZOFRAN) injection 4 mg (4 mg Intravenous Given 2/6/24 0736)   iohexol (OMNIPAQUE) 350 MG/ML injection (MULTI-DOSE) 100 mL (100 mL Intravenous Given 2/6/24 0838)       Diagnostic Studies  Results Reviewed       Procedure Component Value Units Date/Time    Lipase [[id number]]  (Normal) Collected: 02/06/24 0728    Lab Status: Final result Specimen: Blood from Arm, Right Updated: 02/06/24 0801     Lipase 18 u/L     Comprehensive metabolic panel [[id number]]  (Abnormal) Collected: 02/06/24 0728    Lab Status: Final result Specimen: Blood from Arm, Right Updated: 02/06/24 0801     Sodium 131 mmol/L      Potassium 3.6 mmol/L      Chloride 96 mmol/L      CO2 29 mmol/L      ANION GAP 6 mmol/L      BUN 9 mg/dL      Creatinine 0.59 mg/dL      Glucose 81 mg/dL      Calcium 8.9 mg/dL      AST 14 U/L      ALT 15 U/L      Alkaline Phosphatase 68 U/L      Total Protein 6.2 g/dL      Albumin 3.8 g/dL      Total Bilirubin 0.28 mg/dL      eGFR 97 ml/min/1.73sq m     Narrative:      National Kid[employer] Foundation guidelines for Chronic Kidney Disease (CKD):     Stage 1 with normal or high GFR (GFR > 90 mL/min/1.73 square meters)    Stage 2 Mild CKD (GFR = 60-89 mL/min/1.73 square meters)    Stage 3A Moderate CKD (GFR = 45-59 mL/min/1.73 square meters)    Stage 3B Moderate CKD (GFR = 30-44 mL/min/1.73 square meters)    Stage 4 Severe CKD (GFR = 15-29 mL/min/1.73 square meters)    Stage 5 End Stage CKD (GFR <15 mL/min/1.73 square meters)  Note: GFR calculation is accurate only with a steady state creatinine    CBC and differential [[id number]]  (Abnormal) Collected: 02/06/24 0728    Lab Status: Final result Specimen: Blood from Arm, Right Updated: 02/06/24 0750     WBC 3.00  Thousand/uL      RBC 3.73 Million/uL      Hemoglobin 10.9 g/dL      Hematocrit 32.2 %      MCV 86 fL      MCH 29.2 pg      MCHC 33.9 g/dL      RDW 13.7 %      MPV 8.6 fL      Platelets 207 Thousands/uL      nRBC 0 /100 WBCs      Neutrophils Relative 66 %      Immat GRANS % 0 %      Lymphocytes Relative 17 %      Monocytes Relative 10 %      Eosinophils Relative 6 %      Basophils Relative 1 %      Neutrophils Absolute 1.99 Thousands/µL      Immature Grans Absolute 0.01 Thousand/uL      Lymphocytes Absolute 0.52 Thousands/µL      Monocytes Absolute 0.29 Thousand/µL      Eosinophils Absolute 0.17 Thousand/µL      Basophils Absolute 0.02 Thousands/µL                    CT abdomen pelvis with contrast   Final Result by Mirta Escoto MD (02/06 0909)   No acute intra-abdominal pathology. No findings to suspect bowel obstruction.   Small hiatal hernia with mild wall thickening. This may be on the basis of reflux. Correlate to exclude symptoms of gastroesophagitis. If clinically warranted, consider further evaluation with EGD.   Cholelithiasis.   Incidental findings, as per the body of the report.         Workstation performed: [id number]               Procedures  Procedures      ED Course                                       MDM  Medical Decision Making  Amount and/or Complexity of Data Reviewed  Labs: ordered.  Radiology: ordered.    Risk  OTC drugs.  Prescription drug management.      Irene Plascencia is a 63 y.o. female with PMH schizoaffective, chronic hyponatremia, GERD who presents to the emergency department with abdominal pain. Workup including vital signs, physical exam, labs and CT. CT with small hiatal hernia without acute abnormalities and labs largely unremarkable.  Symptoms similar to prior reflux, improvement after GI cocktail.  Referral for GI placed.  Stable for discharge home with GI follow up, discharge instructions and return precautions given.       Disposition  Final diagnoses:   Abdominal  pain   Hiatal hernia   Vomiting     Time reflects when diagnosis was documented in both MDM as applicable and the Disposition within this note       Time User Action Codes Description Comment    2/6/2024 10:02 AM Herrera Hernandez [R10.9] Abdominal pain     2/6/2024 10:02 AM Herrera Hernandez [K44.9] Hiatal hernia     2/6/2024 10:02 AM Herrera Hernandez [R11.10] Vomiting           ED Disposition       ED Disposition   Discharge    Condition   Stable    Date/Time   Tue Feb 6, 2024 10:04 AM    Comment   Irene Plascencia discharge to home/self care.                   Follow-up Information       Follow up With Specialties Details Why Contact Info Additional Information    [facility] Schedule an appointment as soon as possible for a visit   [address] [place] [id number] [phone] [facility], [address] [place] [address], [place], [address], [phone]            Discharge Medication List as of 2/6/2024 10:04 AM        START taking these medications    Details   aluminum-magnesium hydroxide 200-200 MG/5ML suspension Take 15 mL by mouth every 6 (six) hours as needed for heartburn for up to 6 days, Starting Tue 2/6/2024, Until Mon 2/12/2024 at 2359, Normal      ondansetron (ZOFRAN) 4 mg tablet Take 1 tablet (4 mg total) by mouth every 6 (six) hours, Starting Tue 2/6/2024, Normal           CONTINUE these medications which have NOT CHANGED    Details   topiramate (Topamax) 50 MG tablet 1/2 TAB at bedtime for 1 week, increase to 1/2 TAB in AM and PM for 1 week, increase to 1/2 TAB in AM and 1 TAB in PM for 1 week, and finish at 1 TAB in AM and PM, Normal      amLODIPine (NORVASC) 5 mg tablet TAKE 1 TABLET (5 MG TOTAL) BY MOUTH DAILY., Starting Sat 2/3/2024, Normal      benzonatate (TESSALON PERLES) 100 mg capsule Take 1 capsule (100 mg total) by  "mouth 3 (three) times a day as needed for cough, Starting Tue 8/22/2023, Normal      celecoxib (CeleBREX) 200 mg capsule Take 1 capsule (200 mg total) by mouth daily, Starting Tue 10/3/2023, Normal      [employer] Vitamin B-12 1000 MCG tablet TAKE 1 TABLET BY MOUTH EVERY DAY, Starting Tue 10/3/2023, Normal      ferrous sulfate 325 (65 Fe) mg tablet Take 1 tablet (325 mg total) by mouth 2 (two) times a day with meals, Starting Sat 4/29/2023, Normal      folic acid (FOLVITE) 400 mcg tablet Take 1 tablet (400 mcg total) by mouth daily, Starting Fri 6/2/2023, Normal      levothyroxine 25 mcg tablet TAKE 1 TABLET BY MOUTH EVERY DAY, Normal      LORazepam (ATIVAN) 2 mg tablet Take 1 tablet (2 mg total) by mouth every 6 (six) hours as needed for anxiety, Starting Fri 12/22/2023, Normal      pantoprazole (PROTONIX) 40 mg tablet TAKE 1 TABLET BY MOUTH TWICE A DAY, Starting Thu 11/16/2023, Normal      PARoxetine (PAXIL) 30 mg tablet Take 2 tablets (60 mg total) by mouth in the morning, Starting Mon 5/22/2023, Normal      predniSONE 20 mg tablet Take by mouth daily, Historical Med      QUEtiapine (SEROquel XR) 400 mg 24 hr tablet Take 1 tablet (400 mg total) by mouth daily at bedtime, Starting Fri 5/26/2023, Normal      sucralfate (CARAFATE) 1 g tablet TAKE 1 TABLET (1 G TOTAL) BY MOUTH 3 (THREE) TIMES A DAY WITH MEALS, Starting Sat 3/25/2023, Normal      triamcinolone (KENALOG) 0.1 % cream APPLY TOPICALLY 2 TIMES A DAY AS NEEDED FOR RASH., Normal      ziprasidone (GEODON) 80 mg capsule TAKE 1 CAPSULE BY MOUTH EVERY DAY, Normal                 PDMP Review         Value Time User    PDMP Reviewed  Yes 12/22/2023 10:57 AM Raheem Cain MD             ED Provider  Attending physically available and evaluated Irene Plascencia. I managed the patient along with the ED Attending.    Electronically Signed by    Portions of the record may have been created with voice recognition software.  Occasional wrong word or \"sound a like\" " substitutions may have occurred due to the inherent limitations of voice recognition software.  Read the chart carefully and recognize, using context, where substitutions have occurred       Herrera Hernandez MD  02/08/24 0849

## 2024-02-07 ENCOUNTER — TELEPHONE (OUTPATIENT)
Age: 64
End: 2024-02-07

## 2024-02-07 ENCOUNTER — PATIENT OUTREACH (OUTPATIENT)
Dept: INTERNAL MEDICINE CLINIC | Facility: CLINIC | Age: 64
End: 2024-02-07

## 2024-02-07 NOTE — TELEPHONE ENCOUNTER
Patient called and is wonder who Dr Cain might recommend for a gastroenterologist for her. Please call patient back to discuss.

## 2024-02-07 NOTE — PROGRESS NOTES
ADT Notification received.  Patient discharged home from hospital.    Update received from [facility] Ana M stating patient was able to use [employer] for oil fill.  Patient also wanting to wait for apartment to become available with [facility] be[facility]ruitedments; she denied wanting to complete additional housing applications.    Call placed to patient to check status since ED visit and remind her that she may still have $300 in [employer] to use towards next oil fill.  Patient states she is not feeling better.  She plans to call GI to schedule appt; relayed note in chart that PCP does not have preference for GI provider.  Encouraged patient to explore GI provider information via . [facility] Find A Doctor page.  Patient will do this.  Patient confirmed she wants to remain on [facility] Apartment Waitlist at this time.  Patient understanding she can reach out to [facility] when next oil fill is needed to ask about using remainder of crisis funds.  Patient denied any additional needs at this time.  Will close case.

## 2024-02-09 DIAGNOSIS — F41.9 ANXIETY: ICD-10-CM

## 2024-02-09 RX ORDER — LORAZEPAM 2 MG/1
2 TABLET ORAL EVERY 6 HOURS PRN
Qty: 100 TABLET | Refills: 1 | Status: SHIPPED | OUTPATIENT
Start: 2024-02-09

## 2024-02-16 ENCOUNTER — TELEPHONE (OUTPATIENT)
Age: 64
End: 2024-02-16

## 2024-02-21 PROBLEM — J01.00 ACUTE NON-RECURRENT MAXILLARY SINUSITIS: Status: RESOLVED | Noted: 2020-06-01 | Resolved: 2024-02-21

## 2024-02-26 ENCOUNTER — OFFICE VISIT (OUTPATIENT)
Dept: INTERNAL MEDICINE CLINIC | Facility: CLINIC | Age: 64
End: 2024-02-26
Payer: COMMERCIAL

## 2024-02-26 VITALS
BODY MASS INDEX: 34.19 KG/M2 | WEIGHT: 185.8 LBS | SYSTOLIC BLOOD PRESSURE: 126 MMHG | HEIGHT: 62 IN | HEART RATE: 92 BPM | DIASTOLIC BLOOD PRESSURE: 80 MMHG | OXYGEN SATURATION: 99 %

## 2024-02-26 DIAGNOSIS — Z12.4 SCREENING FOR CERVICAL CANCER: ICD-10-CM

## 2024-02-26 DIAGNOSIS — R05.1 ACUTE COUGH: Primary | ICD-10-CM

## 2024-02-26 LAB
SARS-COV-2 AG UPPER RESP QL IA: NEGATIVE
VALID CONTROL: NORMAL

## 2024-02-26 PROCEDURE — 87811 SARS-COV-2 COVID19 W/OPTIC: CPT | Performed by: INTERNAL MEDICINE

## 2024-02-26 PROCEDURE — 99213 OFFICE O/P EST LOW 20 MIN: CPT | Performed by: INTERNAL MEDICINE

## 2024-02-26 RX ORDER — AZITHROMYCIN 250 MG/1
TABLET, FILM COATED ORAL DAILY
Qty: 6 TABLET | Refills: 0 | Status: SHIPPED | OUTPATIENT
Start: 2024-02-26 | End: 2024-03-02

## 2024-02-26 NOTE — PATIENT INSTRUCTIONS
Problem List Items Addressed This Visit          Other    Cough - Primary     Will check for COVID, and based on symptoms we will treat with azithromycin, follow up if not improving.  No respiratory distress, pulse ox 99%.         Relevant Medications    azithromycin (Zithromax) 250 mg tablet     Other Visit Diagnoses       Screening for cervical cancer

## 2024-02-26 NOTE — PROGRESS NOTES
Name: Irene Plascencia      : 1960      MRN: [id number]  Encounter Provider: Raheem Cain MD  Encounter Date: 2024   [facility]    Assessment & Plan     1. Acute cough  Assessment & Plan:  Will check for COVID, and based on symptoms we will treat with azithromycin, follow up if not improving.  No respiratory distress, pulse ox 99%.    Orders:  -     azithromycin (Zithromax) 250 mg tablet; Take 2 tablets (500 mg total) by mouth daily for 1 day, THEN 1 tablet (250 mg total) daily for 4 days.    2. Screening for cervical cancer           Subjective     Onset of cough and congestion about 8 days ago.        Review of Systems   Constitutional:  Positive for chills. Negative for fever.   HENT:  Positive for congestion.    Respiratory:  Positive for cough. Negative for shortness of breath.        Past Medical History:   Diagnosis Date   • Anemia    • Anxiety    • Arthritis    • Back pain at L4-L5 level    • Schreiber esophagus    • Bulimia    • Colon polyp    • Disease of thyroid gland     hypothyroid   • GERD (gastroesophageal reflux disease)    • Hearing loss in left ear    • History of transfusion    • Hyperlipidemia    • Hypertension    • Hypothyroidism    • Leukopenia    • NMS (neuroleptic malignant syndrome) 2020   • Pneumonia    • RA (rheumatoid arthritis) (HCC)     in feet   • Sciatica    • Seizure (HCC)     due to medication mix up 2018 only one historically   • Skin spots, red     lower right arm - poss from cat- no s/s infection   • Synovial cyst of lumbar spine    • Vertigo    • Wears dentures     full set   • Weight gain      Past Surgical History:   Procedure Laterality Date   • BONE MARROW BIOPSY     • BREAST SURGERY      enlargement with implants   • CLOSED REDUCTION DISTAL FEMUR FRACTURE     • COLONOSCOPY     • COSMETIC SURGERY     • DENTAL SURGERY     • HIP ARTHROPLASTY Right 2020    Procedure: REVISION TOTAL HIP ARTHROPLASTY  WITH REPAIR OF PARIPROSTHETIC FRACTURE - POSTERIOR APPROACH;  Surgeon: Dilan Pedersen MD;  [facility];  Service: Orthopedics   • ME AMPUTATION METATARSAL W/TOE SINGLE Left 04/07/2023    Procedure: AMPUTATION TOE 4th;  Surgeon: Conner Bartlett DPM;  Location:  MAIN OR;  Service: Podiatry   • ME ARTHRP ACETBLR/PROX FEM PROSTC AGRFT/ALGRFT Right 12/11/2019    Procedure: ARTHROPLASTY HIP TOTAL ANTERIOR;  Surgeon: Dilan Pedersen MD;  [facility];  Service: Orthopedics   • ME ESOPHAGOGASTRODUODENOSCOPY TRANSORAL DIAGNOSTIC N/A 05/11/2021    Procedure: ESOPHAGOGASTRODUODENOSCOPY (EGD);  Surgeon: David Cedeño MD;  [facility];  Service: General   • ME LAPS RPR PARAESPHGL HRNA INCL FUNDPLSTY W/MESH N/A 05/11/2021    Procedure: REPAIR HERNIA PARAESOPHAGEAL  LAPAROSCOPIC;  Surgeon: David Cedeño MD;  [facility];  Service: General   • ME UNLISTED PROCEDURE ESOPHAGUS N/A 05/11/2021    Procedure: FUNDOPLICATION TRANSORAL INCISIONLESS;  Surgeon: Brad Cadet MD;  [facility];  Service: Gastroenterology   • RHINOPLASTY     • SINUS SURGERY     • TOE AMPUTATION Left     2023 4th toe   • TRANSORAL INCISIONLESS FUNDOPLICATION (TIF)  05/11/2021     Family History   Problem Relation Age of Onset   • Uterine cancer Mother    • Esophageal cancer Father    • Colon cancer Maternal Grandmother      Social History     Socioeconomic History   • Marital status: Single     Spouse name: None   • Number of children: None   • Years of education: None   • Highest education level: None   Occupational History   • None   Tobacco Use   • Smoking status: Never   • Smokeless tobacco: Never   Vaping Use   • Vaping status: Never Used   Substance and Sexual Activity   • Alcohol use: Not Currently   • Drug use: Not Currently   • Sexual activity: Not Currently   Other Topics Concern   • None   Social History Narrative    [facility]     Social  Determinants of Health     Financial Resource Strain: Medium Risk (1/4/2023)    Overall Financial Resource Strain (CARDIA)    • Difficulty of Paying Living Expenses: Somewhat hard   Food Insecurity: No Food Insecurity (9/11/2023)    Hunger Vital Sign    • Worried About Running Out of Food in the Last Year: Never true    • Ran Out of Food in the Last Year: Never true   Transportation Needs: No Transportation Needs (9/11/2023)    PRAPARE - Transportation    • Lack of Transportation (Medical): No    • Lack of Transportation (Non-Medical): No   Physical Activity: Not on file   Stress: Stress Concern Present (5/18/2021)    [place] Check of Occupational Health - Occupational Stress Questionnaire    • Feeling of Stress : To some extent   Social Connections: Not on file   Intimate Partner Violence: Not on file   Housing Stability: Unknown (9/11/2023)    Housing Stability Vital Sign    • Unable to Pay for Housing in the Last Year: No    • Number of Places Lived in the Last Year: Not on file    • Unstable Housing in the Last Year: No     Current Outpatient Medications on File Prior to Visit   Medication Sig   • amLODIPine (NORVASC) 5 mg tablet TAKE 1 TABLET (5 MG TOTAL) BY MOUTH DAILY.   • celecoxib (CeleBREX) 200 mg capsule Take 1 capsule (200 mg total) by mouth daily   • [facility] Vitamin B-12 1000 MCG tablet TAKE 1 TABLET BY MOUTH EVERY DAY   • levothyroxine 25 mcg tablet TAKE 1 TABLET BY MOUTH EVERY DAY   • LORazepam (ATIVAN) 2 mg tablet TAKE 1 TABLET BY MOUTH EVERY 6 HOURS AS NEEDED FOR ANXIETY.   • ondansetron (ZOFRAN) 4 mg tablet Take 1 tablet (4 mg total) by mouth every 6 (six) hours   • pantoprazole (PROTONIX) 40 mg tablet TAKE 1 TABLET BY MOUTH TWICE A DAY   • PARoxetine (PAXIL) 30 mg tablet Take 2 tablets (60 mg total) by mouth in the morning   • QUEtiapine (SEROquel XR) 400 mg 24 hr tablet Take 1 tablet (400 mg total) by mouth daily at bedtime   • sucralfate (CARAFATE) 1 g tablet TAKE 1 TABLET (1 G TOTAL) BY MOUTH 3  (THREE) TIMES A DAY WITH MEALS   • topiramate (Topamax) 50 MG tablet 1/2 TAB at bedtime for 1 week, increase to 1/2 TAB in AM and PM for 1 week, increase to 1/2 TAB in AM and 1 TAB in PM for 1 week, and finish at 1 TAB in AM and PM   • triamcinolone (KENALOG) 0.1 % cream APPLY TOPICALLY 2 TIMES A DAY AS NEEDED FOR RASH.   • ziprasidone (GEODON) 80 mg capsule TAKE 1 CAPSULE BY MOUTH EVERY DAY   • benzonatate (TESSALON PERLES) 100 mg capsule Take 1 capsule (100 mg total) by mouth 3 (three) times a day as needed for cough (Patient not taking: Reported on 1/11/2024)   • ferrous sulfate 325 (65 Fe) mg tablet Take 1 tablet (325 mg total) by mouth 2 (two) times a day with meals (Patient not taking: Reported on 1/11/2024)   • folic acid (FOLVITE) 400 mcg tablet Take 1 tablet (400 mcg total) by mouth daily (Patient not taking: Reported on 1/11/2024)   • predniSONE 20 mg tablet Take by mouth daily (Patient not taking: Reported on 12/4/2023)     Allergies   Allergen Reactions   • Risperdal [Risperidone] Shortness Of Breath     Rapid heart beat, SOB   • Zyprexa [Olanzapine] Shortness Of Breath     Rapid heartbeat   • Latex Rash     Band aids, adhesives wears normal underwear, can eat bananas  USE PAPER TAPE     Immunization History   Administered Date(s) Administered   • COVID-19 PFIZER VACCINE 0.3 ML IM 03/20/2021, 04/10/2021, 10/09/2021   • COVID-19 Pfizer Vac BIVALENT Jt-sucrose 12 Yr+ IM 09/10/2022   • COVID-19 Pfizer mRNA vacc PF jt-sucrose 12 yr and older (Comirnaty) 10/04/2023   • COVID-19 Pfizer vac (Jt-sucrose, gray cap) 12 yr+ IM 05/30/2022   • H1N1, All Formulations 11/17/2009   • INFLUENZA 11/17/2009, 01/04/2013, 11/11/2014, 10/20/2015, 10/31/2016, 09/16/2018, 11/07/2022   • Influenza Injectable, MDCK, Preservative Free, Quadrivalent, 0.5 mL 09/26/2019   • Influenza Quadrivalent, 6-35 Months IM 10/31/2016   • Influenza, injectable, quadrivalent, preservative free 0.5 mL 09/05/2020, 09/26/2023   • Influenza,  "recombinant, quadrivalent,injectable, preservative free 11/04/2021   • Pneumococcal Conjugate 13-Valent 09/16/2018   • Rabies 07/29/2014, 08/01/2014, 08/05/2014   • Tdap 07/29/2014, 12/17/2022, 06/16/2023       Objective     /80 (BP Location: Left arm, Patient Position: Sitting, Cuff Size: Standard)   Pulse 92   Ht 5' 2\" (1.575 m)   Wt 84.3 kg (185 lb 12.8 oz)   SpO2 99%   BMI 33.98 kg/m²     Physical Exam  Constitutional:       General: She is not in acute distress.     Appearance: Normal appearance.   Pulmonary:      Effort: Pulmonary effort is normal. No respiratory distress.      Breath sounds: Normal breath sounds. No wheezing or rhonchi.   Neurological:      Mental Status: She is alert.       Raheem Cain MD    "

## 2024-02-26 NOTE — ASSESSMENT & PLAN NOTE
Will check for COVID, and based on symptoms we will treat with azithromycin, follow up if not improving.  No respiratory distress, pulse ox 99%.

## 2024-03-04 ENCOUNTER — TELEMEDICINE (OUTPATIENT)
Dept: INTERNAL MEDICINE CLINIC | Facility: CLINIC | Age: 64
End: 2024-03-04
Payer: COMMERCIAL

## 2024-03-04 DIAGNOSIS — R05.1 ACUTE COUGH: Primary | ICD-10-CM

## 2024-03-04 PROCEDURE — G2211 COMPLEX E/M VISIT ADD ON: HCPCS | Performed by: INTERNAL MEDICINE

## 2024-03-04 PROCEDURE — 99213 OFFICE O/P EST LOW 20 MIN: CPT | Performed by: INTERNAL MEDICINE

## 2024-03-04 RX ORDER — AZITHROMYCIN 250 MG/1
TABLET, FILM COATED ORAL DAILY
Qty: 6 TABLET | Refills: 0 | Status: SHIPPED | OUTPATIENT
Start: 2024-03-04 | End: 2024-03-09

## 2024-03-04 NOTE — PATIENT INSTRUCTIONS
Problem List Items Addressed This Visit          Other    Cough - Primary     Onset of cough one week ago.  Patient had a negative COVID test February 26.  She was treated with azithromycin felt she was improving, discussed with patient that I expect her to continue to improve over the next couple of days, if taking a turn for the worse, can take the azithromycin, new prescription sent.  Follow-up if worsening or shortness of breath         Relevant Medications    azithromycin (Zithromax) 250 mg tablet

## 2024-03-04 NOTE — ASSESSMENT & PLAN NOTE
Onset of cough one week ago.  Patient had a negative COVID test February 26.  She was treated with azithromycin felt she was improving, discussed with patient that I expect her to continue to improve over the next couple of days, if taking a turn for the worse, can take the azithromycin, new prescription sent.  Follow-up if worsening or shortness of breath

## 2024-03-04 NOTE — PROGRESS NOTES
Virtual Regular Visit    Verification of patient location:    Patient is located at Home in the following state in which I hold an active license [place]      Assessment/Plan:    Problem List Items Addressed This Visit        Other    Cough - Primary     Onset of cough one week ago.  Patient had a negative COVID test February 26.  She was treated with azithromycin felt she was improving, discussed with patient that I expect her to continue to improve over the next couple of days, if taking a turn for the worse, can take the azithromycin, new prescription sent.  Follow-up if worsening or shortness of breath         Relevant Medications    azithromycin (Zithromax) 250 mg tablet            Reason for visit is   Chief Complaint   Patient presents with   • Virtual Regular Visit          Encounter provider Raheem Cain MD    Provider located at [address] [facility]  [address] [facility] [place] [address]      Recent Visits  Date Type Provider Dept   02/26/24 Office Visit Raheem Cain MD [facility]   Showing recent visits within past 7 days and meeting all other requirements  Today's Visits  Date Type Provider Dept   03/04/24 Telemedicine Raheem Cain MD [facility]   Showing today's visits and meeting all other requirements  Future Appointments  No visits were found meeting these conditions.  Showing future appointments within next 150 days and meeting all other requirements       The patient was identified by name and date of birth. Irene YIN Susankemal was informed that this is a telemedicine visit and that the visit is being conducted through Telephone.  My office door was closed. No one else was in the room.  She acknowledged consent and understanding of privacy and security of the video platform. The patient has agreed to participate and understands they can discontinue the visit at any time.    Patient is aware this is a billable service.      Federico Plascencia is a 63 y.o. female  .      Patient having ongoing cough         Past Medical History:   Diagnosis Date   • Anemia    • Anxiety    • Arthritis    • Back pain at L4-L5 level    • Schreiber esophagus    • Bulimia    • Colon polyp    • Disease of thyroid gland     hypothyroid   • GERD (gastroesophageal reflux disease)    • Hearing loss in left ear    • History of transfusion    • Hyperlipidemia    • Hypertension    • Hypothyroidism    • Leukopenia    • NMS (neuroleptic malignant syndrome) 01/03/2020   • Pneumonia    • RA (rheumatoid arthritis) (HCC)     in feet   • Sciatica    • Seizure (HCC)     due to medication mix up 8/2018 only one historically   • Skin spots, red     lower right arm - poss from cat- no s/s infection   • Synovial cyst of lumbar spine    • Vertigo    • Wears dentures     full set   • Weight gain        Past Surgical History:   Procedure Laterality Date   • BONE MARROW BIOPSY     • BREAST SURGERY      enlargement with implants   • CLOSED REDUCTION DISTAL FEMUR FRACTURE     • COLONOSCOPY     • COSMETIC SURGERY     • DENTAL SURGERY     • HIP ARTHROPLASTY Right 01/02/2020    Procedure: REVISION TOTAL HIP ARTHROPLASTY WITH REPAIR OF PARIPROSTHETIC FRACTURE - POSTERIOR APPROACH;  Surgeon: Dilan Pedersen MD;  [facility];  Service: Orthopedics   • [place] AMPUTATION METATARSAL W/TOE SINGLE Left 04/07/2023    Procedure: AMPUTATION TOE 4th;  Surgeon: Conner Bartlett DPM;  Location:  MAIN OR;  Service: Podiatry   • KY ARTHRP ACETBLR/PROX FEM PROSTC AGRFT/ALGRFT Right 12/11/2019    Procedure: ARTHROPLASTY HIP TOTAL ANTERIOR;  Surgeon: Dilan Pedersen MD;  Location:  MAIN OR;  Service: Orthopedics   • KY ESOPHAGOGASTRODUODENOSCOPY TRANSORAL DIAGNOSTIC N/A 05/11/2021    Procedure: ESOPHAGOGASTRODUODENOSCOPY (EGD);  Surgeon: David Cedeño MD;  [facility];  Service: General   • KY LAPS RPR PARAESPHGL HRNA INCL FUNDPLSTY W/MESH N/A 05/11/2021    Procedure:  REPAIR HERNIA PARAESOPHAGEAL  LAPAROSCOPIC;  Surgeon: David Cedeño MD;  [facility];  Service: General   • [place] UNLISTED PROCEDURE ESOPHAGUS N/A 05/11/2021    Procedure: FUNDOPLICATION TRANSORAL INCISIONLESS;  Surgeon: Brad Cadet MD;  Location[facility];  Service: Gastroenterology   • RHINOPLASTY     • SINUS SURGERY     • TOE AMPUTATION Left     2023 4th toe   • TRANSORAL INCISIONLESS FUNDOPLICATION (TIF)  05/11/2021       Current Outpatient Medications   Medication Sig Dispense Refill   • amLODIPine (NORVASC) 5 mg tablet TAKE 1 TABLET (5 MG TOTAL) BY MOUTH DAILY. 90 tablet 3   • azithromycin (Zithromax) 250 mg tablet Take 2 tablets (500 mg total) by mouth daily for 1 day, THEN 1 tablet (250 mg total) daily for 4 days. 6 tablet 0   • benzonatate (TESSALON PERLES) 100 mg capsule Take 1 capsule (100 mg total) by mouth 3 (three) times a day as needed for cough 30 capsule 5   • celecoxib (CeleBREX) 200 mg capsule Take 1 capsule (200 mg total) by mouth daily 30 capsule 5   • [facility] Vitamin B-12 1000 MCG tablet TAKE 1 TABLET BY MOUTH EVERY DAY 90 tablet 1   • levothyroxine 25 mcg tablet TAKE 1 TABLET BY MOUTH EVERY DAY 90 tablet 3   • LORazepam (ATIVAN) 2 mg tablet TAKE 1 TABLET BY MOUTH EVERY 6 HOURS AS NEEDED FOR ANXIETY. 100 tablet 1   • ondansetron (ZOFRAN) 4 mg tablet Take 1 tablet (4 mg total) by mouth every 6 (six) hours 12 tablet 0   • pantoprazole (PROTONIX) 40 mg tablet TAKE 1 TABLET BY MOUTH TWICE A  tablet 1   • PARoxetine (PAXIL) 30 mg tablet Take 2 tablets (60 mg total) by mouth in the morning 180 tablet 3   • QUEtiapine (SEROquel XR) 400 mg 24 hr tablet Take 1 tablet (400 mg total) by mouth daily at bedtime 90 tablet 3   • sucralfate (CARAFATE) 1 g tablet TAKE 1 TABLET (1 G TOTAL) BY MOUTH 3 (THREE) TIMES A DAY WITH MEALS 270 tablet 3   • topiramate (Topamax) 50 MG tablet 1/2 TAB at bedtime for 1 week, increase to 1/2 TAB in AM and PM for 1 week, increase to 1/2 TAB in AM and 1 TAB in PM  for 1 week, and finish at 1 TAB in AM and  tablet 3   • triamcinolone (KENALOG) 0.1 % cream APPLY TOPICALLY 2 TIMES A DAY AS NEEDED FOR RASH. 160 g 5   • ziprasidone (GEODON) 80 mg capsule TAKE 1 CAPSULE BY MOUTH EVERY DAY 90 capsule 3   • ferrous sulfate 325 (65 Fe) mg tablet Take 1 tablet (325 mg total) by mouth 2 (two) times a day with meals (Patient not taking: Reported on 1/11/2024) 60 tablet 0   • folic acid (FOLVITE) 400 mcg tablet Take 1 tablet (400 mcg total) by mouth daily (Patient not taking: Reported on 1/11/2024) 30 tablet 0   • predniSONE 20 mg tablet Take by mouth daily (Patient not taking: Reported on 12/4/2023)       No current facility-administered medications for this visit.        Allergies   Allergen Reactions   • Risperdal [Risperidone] Shortness Of Breath     Rapid heart beat, SOB   • Zyprexa [Olanzapine] Shortness Of Breath     Rapid heartbeat   • Latex Rash     Band aids, adhesives wears normal underwear, can eat bananas  USE PAPER TAPE       Review of Systems   Constitutional:  Positive for chills and fever (low grade yesterday).   HENT:  Positive for rhinorrhea. Negative for sore throat.    Respiratory:  Positive for cough. Negative for shortness of breath.    Cardiovascular:  Negative for chest pain.       Video Exam    There were no vitals filed for this visit.    Physical Exam     Visit Time  Total Visit Duration: 20

## 2024-03-06 ENCOUNTER — TELEPHONE (OUTPATIENT)
Dept: DERMATOLOGY | Facility: CLINIC | Age: 64
End: 2024-03-06

## 2024-03-06 NOTE — TELEPHONE ENCOUNTER
Called patient to advise her appt with Dr. Larry on 4/11 was r/s for 5/22 @ 1:00, due to provider being out of the office. Left a message for pt to call the office to confirm new appt date/time.

## 2024-03-13 ENCOUNTER — HOSPITAL ENCOUNTER (EMERGENCY)
Facility: HOSPITAL | Age: 64
Discharge: HOME | End: 2024-03-13
Attending: EMERGENCY MEDICINE
Payer: COMMERCIAL

## 2024-03-13 VITALS
TEMPERATURE: 98.1 F | DIASTOLIC BLOOD PRESSURE: 86 MMHG | SYSTOLIC BLOOD PRESSURE: 172 MMHG | HEART RATE: 90 BPM | RESPIRATION RATE: 20 BRPM | OXYGEN SATURATION: 96 %

## 2024-03-13 DIAGNOSIS — F41.9 ANXIETY: Primary | ICD-10-CM

## 2024-03-13 PROCEDURE — 99282 EMERGENCY DEPT VISIT SF MDM: CPT

## 2024-03-13 PROCEDURE — 99284 EMERGENCY DEPT VISIT MOD MDM: CPT | Performed by: EMERGENCY MEDICINE

## 2024-03-13 NOTE — CASE MANAGEMENT
Case Management Discharge Planning Note    Patient name Irene Plascencia  Location [facility] MRN [id number]  : 1960 Date 3/13/2024       Current Admission Date: 3/13/2024  Current Admission Diagnosis:Encounter for psychological evaluation   Patient Active Problem List    Diagnosis Date Noted    Acquired absence of other toe(s), unspecified side ([facility]) 2024    Osteomyelitis, unspecified site, unspecified type ([facility]) 2024    Vitamin B12 deficiency (dietary) anemia 2023    Folate deficiency 2023    Neutropenia (HCC) 2023    Post concussion syndrome 2023    Sepsis without acute organ dysfunction ([facility]) 2023    Acute respiratory failure with hypoxia ([facility]) 2023    Headache 2023    Nutritional anemia 2023    Stress incontinence 2023    Ulcer of toe, chronic, left, with unspecified severity ([facility]) 2023    Acute encephalopathy 2022    Thrombocytosis 2022    Abdominal pain 2022    Cough 2022    Schreiber's esophagus 2022    Microcytic anemia 2022    Self neglect 2022    Pruritus 2022    Upper GI bleed 01/10/2022    Nausea and vomiting 10/22/2021    S/P tooth extraction 10/22/2021    SIRS (systemic inflammatory response syndrome) ([facility]) 2021    Hyperlipidemia 2021    Word finding difficulty 2021    Hiatal hernia 2021    Polyp of colon 2021    Melena 2020    Cellulitis of left upper extremity 2020    Erosive esophagitis 2020    Rash 2020    Iron deficiency anemia 2020    Multiple excoriations 2020    Fall 2020    Esophagitis 2020    Hyponatremia 2020    Problem related to living arrangement 2020    Hypokalemia     NMS (neuroleptic malignant syndrome) 2020    Preop exam for internal medicine 2019    Severe iron deficiency anemia  2019    Chronic bilateral low back pain without sciatica 2019     Right hip pain 07/15/2019    Primary hypertension 06/12/2019    Dermal hypersensitivity reaction 11/08/2018    Psychiatric disorder 08/21/2018    Schizoaffective disorder (HCC) 08/16/2018    Serotonin syndrome 08/13/2018    Other fatigue 06/19/2018    Spinal stenosis of lumbar region with neurogenic claudication 06/15/2018    Lumbar spondylosis 06/15/2018    T12 compression fracture (HCC) 06/15/2018    Synovial cyst of lumbar facet joint 06/15/2018    Localized osteoporosis with current pathological fracture with routine healing 05/25/2018    Lumbar radiculopathy 03/19/2018    DDD (degenerative disc disease), lumbar 03/19/2018    Spondylolisthesis at L4-L5 level 03/19/2018    Anxiety 10/31/2016    Acquired hypothyroidism 10/31/2016    Constipation 04/10/2014    Bulimia nervosa in remission 03/19/2014      LOS (days): 0  Geometric Mean LOS (GMLOS) (days):   Days to GMLOS:     OBJECTIVE:       Current admission status: Emergency   Preferred Pharmacy:   [id number] - [place], [place] - [address]  [address] [place] [address]  Phone: [phone] Fax: [phone]    Primary Care Provider: Raheem Cain MD    Primary Insurance: [email or web address]  Secondary Insurance: [facility]    DISCHARGE DETAILS:    Discharge planning discussed with:: Patient  Freedom of Choice: Yes     CM contacted family/caregiver?: No- see comments (Pt declined stating she has no friends or family other than her mother who does not have capacity and is in a nursing facility)      Other Referral/Resources/Interventions Provided:  Interventions: FindHelp  Referral Comments: After meeting with pt she states that her mother used to live at home with her but Aging removed her mother from the home and the mother is now in a nursing facility with a court appointed guardian.  Per pt, now that her mother is not in the home she only has half the income from social security and now pt is unable to pay the  utility bills.  Pt states she is being threatened that her electric and heat will be turned off.  YASMIN provided pt with information of LIHEAP and a financial assistance program for [employer]  called HELP.  Both programs are directed towards low income individuals living in [place]. YASMIN also provided pt with information for a utility assistance help line should she need additional help or resources.  Pt thanked YASMIN for the resources and states they will be very helpful.
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
## 2024-03-13 NOTE — DISCHARGE INSTRUCTIONS
Please follow up with your PCP. You have been given a list of psychiatrists and resources. Call around to get an appointment     Call your doctor or return to the ER if you experience worsening symptoms

## 2024-03-21 ENCOUNTER — OFFICE VISIT (OUTPATIENT)
Dept: INTERNAL MEDICINE CLINIC | Facility: CLINIC | Age: 64
End: 2024-03-21
Payer: COMMERCIAL

## 2024-03-21 VITALS
WEIGHT: 185 LBS | BODY MASS INDEX: 33.84 KG/M2 | HEART RATE: 56 BPM | DIASTOLIC BLOOD PRESSURE: 72 MMHG | OXYGEN SATURATION: 96 % | SYSTOLIC BLOOD PRESSURE: 118 MMHG

## 2024-03-21 DIAGNOSIS — F41.9 ANXIETY: Primary | ICD-10-CM

## 2024-03-21 PROCEDURE — G2211 COMPLEX E/M VISIT ADD ON: HCPCS | Performed by: INTERNAL MEDICINE

## 2024-03-21 PROCEDURE — 99214 OFFICE O/P EST MOD 30 MIN: CPT | Performed by: INTERNAL MEDICINE

## 2024-03-21 RX ORDER — LORAZEPAM 2 MG/1
2 TABLET ORAL EVERY 4 HOURS PRN
Qty: 180 TABLET | Refills: 0 | Status: SHIPPED | OUTPATIENT
Start: 2024-03-21
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
## 2024-03-21 NOTE — PATIENT INSTRUCTIONS
Problem List Items Addressed This Visit          Behavioral Health    Anxiety - Primary     Discussed getting to ED if not able to deal with her anxiety.  Continue medications, patient asking for a short-term increase in her lorazepam, she is currently taking 2 mg every 6 hours, and wants to increase it to 2 mg every 4 hours.  Discussed that we can do this short term, with plans to titrate down once her stress levels come back down.         Relevant Medications    LORazepam (ATIVAN) 2 mg tablet

## 2024-03-21 NOTE — ASSESSMENT & PLAN NOTE
Discussed getting to ED if not able to deal with her anxiety.  Continue medications, patient asking for a short-term increase in her lorazepam, she is currently taking 2 mg every 6 hours, and wants to increase it to 2 mg every 4 hours.  Discussed that we can do this short term, with plans to titrate down once her stress levels come back down.

## 2024-03-25 ENCOUNTER — TELEPHONE (OUTPATIENT)
Dept: HEMATOLOGY ONCOLOGY | Facility: CLINIC | Age: 64
End: 2024-03-25

## 2024-03-28 ENCOUNTER — OFFICE VISIT (OUTPATIENT)
Dept: HEMATOLOGY ONCOLOGY | Facility: CLINIC | Age: 64
End: 2024-03-28
Payer: COMMERCIAL

## 2024-03-28 VITALS
TEMPERATURE: 97.1 F | BODY MASS INDEX: 34.39 KG/M2 | DIASTOLIC BLOOD PRESSURE: 80 MMHG | SYSTOLIC BLOOD PRESSURE: 162 MMHG | HEART RATE: 102 BPM | OXYGEN SATURATION: 93 % | WEIGHT: 188 LBS

## 2024-03-28 DIAGNOSIS — D64.9 ANEMIA, UNSPECIFIED TYPE: ICD-10-CM

## 2024-03-28 DIAGNOSIS — E61.1 IRON DEFICIENCY: Primary | ICD-10-CM

## 2024-03-28 DIAGNOSIS — R74.8 ELEVATED VITAMIN B12 LEVEL: ICD-10-CM

## 2024-03-28 DIAGNOSIS — E53.8 FOLATE DEFICIENCY: ICD-10-CM

## 2024-03-28 DIAGNOSIS — D53.9 NUTRITIONAL ANEMIA: ICD-10-CM

## 2024-03-28 PROBLEM — R79.89 ELEVATED VITAMIN B12 LEVEL: Status: ACTIVE | Noted: 2024-03-28

## 2024-03-28 PROCEDURE — 99213 OFFICE O/P EST LOW 20 MIN: CPT

## 2024-03-28 RX ORDER — FERROUS SULFATE 325(65) MG
325 TABLET ORAL
Qty: 60 TABLET | Refills: 3 | Status: SHIPPED | OUTPATIENT
Start: 2024-03-28

## 2024-03-28 RX ORDER — LANOLIN ALCOHOL/MO/W.PET/CERES
400 CREAM (GRAM) TOPICAL DAILY
Qty: 30 TABLET | Refills: 3 | Status: SHIPPED | OUTPATIENT
Start: 2024-03-28

## 2024-03-28 NOTE — PROGRESS NOTES
HEMATOLOGY / ONCOLOGY CLINIC FOLLOW UP NOTE    Primary Care Provider: Raheem Cain MD  Referring Provider:    MRN: [id number]  : 1960    Reason for Encounter: Follow-up microcytic anemia         Interval History: Patient presents for follow-up of microcytic anemia.  She is under a tremendous amount of stress and has had to go to the ED for anxiety.  Patient reports that she is having difficulty paying her bills.  When asked if she has enough money for food, patient responded yes.  After recent hospitalization, she is working with a  to assist her with her financial situation.    Patient endorses fatigue however, reports she is not able to sleep at night due to anxiety.  She reports she sleeps 3 to 4 hours.  Patient was prescribed lorazepam as needed, which she reports she takes in the morning.  Denies fevers, frequent infections, drenching night sweats, decreased appetite or weight loss.  No abdominal pain, constipation or diarrhea.  Patient denies abnormal bleeding: epistaxis, gingival bleeding, hematuria, dark tarry stools.    Labs:  3/25/2024: Hgb 12.6, MCV 82.9, WBC 3.3, ANC 1.97, absolute lymphocytes 647, vitamin B12 1882, folate 14.2     9/15/2023: Hgb 12.8, MCV 84.8, platelets 235,000, WBC 2.0, ANC 1.01, serum iron 83, iron saturation 22%, ferritin 101.     Treatment:  2023 to 2023 - Venofer 300 mg x 4 doses    2023: Started on oral iron supplements and folic acid 400 mcg daily    REVIEW OF SYSTEMS:  Please note that a 14-point review of systems was performed to include Constitutional, HEENT, Respiratory, CVS, GI, , Musculoskeletal, Integumentary, Neurologic, Rheumatologic, Endocrinologic, Psychiatric, Lymphatic, and Hematologic/Oncologic systems were reviewed and are negative unless otherwise stated in HPI. Positive and negative findings pertinent to this evaluation are incorporated into the history of present illness.      ECOG PS: 1    HPI:  Irene Plascencia was  seen for initial consultation 6/2/2023 regarding microcytic anemia.  Patient has a PMH of Schreiber's esophagus, hiatal hernia, esophagitis, chronic anemia. She was recently hospitalized on 4/28 for dizziness, cough and vomiting.  She was found to have a Hgb of 6.6, ferritin 3, serum iron 13, % saturation 2, Vitamin B12 562, Folate 13.5.  She was transfused 1U PRBCs and IV iron.  Patient declined further evaluation from GI inpatient and was to follow up with her Gastroenterologist upon discharge.       Irene is c/o fatigue, dizziness, headaches.  Denies PICA, RLS, blood in urine or stool.  She is taking oral iron supplements daily.      Recent labs: Hgb 11.4, MCV 69, WBC 5.7, Platelets 368.     Patient had an EGD completed 11/2022 for her Schreiber's esophagus, no abnormal findings.  Her last colonoscopy was 2020, found to have a polyp, otherwise, unremarkable.      Treatment:  6/21/2023 to 7/12/2023 - Venofer 300 mg x 4 doses    9/25/2023: Started on oral iron supplements and folic acid 400 mcg daily    PROBLEM LIST:  Patient Active Problem List   Diagnosis    Lumbar radiculopathy    DDD (degenerative disc disease), lumbar    Spondylolisthesis at L4-L5 level    Localized osteoporosis with current pathological fracture with routine healing    Spinal stenosis of lumbar region with neurogenic claudication    Lumbar spondylosis    T12 compression fracture (HCC)    Synovial cyst of lumbar facet joint    Anxiety    Bulimia nervosa in remission    Constipation    Acquired hypothyroidism    Other fatigue    Serotonin syndrome    Schizoaffective disorder (HCC)    Psychiatric disorder    Dermal hypersensitivity reaction    Primary hypertension    Right hip pain    Chronic bilateral low back pain without sciatica    Severe iron deficiency anemia     Preop exam for internal medicine    NMS (neuroleptic malignant syndrome)    Hypokalemia    Fall    Esophagitis    Hyponatremia    Problem related to living arrangement    Iron  deficiency anemia    Multiple excoriations    Rash    Cellulitis of left upper extremity    Erosive esophagitis    Melena    Hiatal hernia    Polyp of colon    Word finding difficulty    SIRS (systemic inflammatory response syndrome) (HCC)    Hyperlipidemia    Nausea and vomiting    S/P tooth extraction    Upper GI bleed    Pruritus    Self neglect    Microcytic anemia    Schreiber's esophagus    Cough    Acute encephalopathy    Thrombocytosis    Abdominal pain    Stress incontinence    Ulcer of toe, chronic, left, with unspecified severity (HCC)    Nutritional anemia    Headache    Acute respiratory failure with hypoxia (HCC)    Sepsis without acute organ dysfunction (HCC)    Post concussion syndrome    Neutropenia (HCC)    Vitamin B12 deficiency (dietary) anemia    Folate deficiency    Acquired absence of other toe(s), unspecified side (HCC)    Osteomyelitis, unspecified site, unspecified type (HCC)       Assessment / Plan: We reviewed patient's labs in detail.  Her iron studies are stable at this time therefore, recommend she continue taking oral iron folic acid supplements daily.    Patient's vitamin B12 level is elevated however, she is not taking any supplements.  We will continue to monitor this.  She is noted to be mildly leukopenic.  We had previously discussed Seroquel, Geodon, all have adverse effect of leukopenia.  She is asymptomatic therefore likely medication related.    However patient encouragement and emotional support during office visit.  We will see her back in the office in 3 months with repeat labs (CBCD, iron panel, folate, vitamin B12).  She is agreeable with plan.  Patient aware to contact us for worsening symptoms or for additional questions/concerns.          I spent 20 minutes on chart review, face to face counseling time, coordination of care and documentation.    Past Medical History:   has a past medical history of Anemia, Anxiety, Arthritis, Back pain at L4-L5 level, Schreiber esophagus,  Bulimia, Colon polyp, Disease of thyroid gland, GERD (gastroesophageal reflux disease), Hearing loss in left ear, History of transfusion, Hyperlipidemia, Hypertension, Hypothyroidism, Leukopenia, NMS (neuroleptic malignant syndrome) (01/03/2020), Pneumonia, RA (rheumatoid arthritis) (HCC), Sciatica, Seizure (HCC), Skin spots, red, Synovial cyst of lumbar spine, Vertigo, Wears dentures, and Weight gain.    PAST SURGICAL HISTORY:   has a past surgical history that includes Rhinoplasty; Bone marrow biopsy; Colonoscopy; pr arthrp acetblr/prox fem prostc agrft/algrft (Right, 12/11/2019); Hip Arthroplasty (Right, 01/02/2020); Sinus surgery; Cosmetic surgery; Closed reduction distal femur fracture; pr laps rpr paraesphgl hrna incl fundplsty w/mesh (N/A, 05/11/2021); pr esophagogastroduodenoscopy transoral diagnostic (N/A, 05/11/2021); pr unlisted procedure esophagus (N/A, 05/11/2021); Transoral incisionless fundoplication (TIF) (05/11/2021); Breast surgery; Dental surgery; pr amputation metatarsal w/toe single (Left, 04/07/2023); and Toe amputation (Left).    CURRENT MEDICATIONS  Current Outpatient Medications   Medication Sig Dispense Refill    amLODIPine (NORVASC) 5 mg tablet TAKE 1 TABLET (5 MG TOTAL) BY MOUTH DAILY. 90 tablet 3    celecoxib (CeleBREX) 200 mg capsule Take 1 capsule (200 mg total) by mouth daily 30 capsule 5    [facility] Vitamin B-12 1000 MCG tablet TAKE 1 TABLET BY MOUTH EVERY DAY 90 tablet 1    ferrous sulfate 325 (65 Fe) mg tablet Take 1 tablet (325 mg total) by mouth daily with breakfast 60 tablet 3    folic acid (FOLVITE) 400 mcg tablet Take 1 tablet (400 mcg total) by mouth daily 30 tablet 3    levothyroxine 25 mcg tablet TAKE 1 TABLET BY MOUTH EVERY DAY 90 tablet 3    LORazepam (ATIVAN) 2 mg tablet Take 1 tablet (2 mg total) by mouth every 4 (four) hours as needed for anxiety 180 tablet 0    ondansetron (ZOFRAN) 4 mg tablet Take 1 tablet (4 mg total) by mouth every 6 (six) hours 12 tablet 0     pantoprazole (PROTONIX) 40 mg tablet TAKE 1 TABLET BY MOUTH TWICE A  tablet 1    PARoxetine (PAXIL) 30 mg tablet Take 2 tablets (60 mg total) by mouth in the morning 180 tablet 3    sucralfate (CARAFATE) 1 g tablet TAKE 1 TABLET (1 G TOTAL) BY MOUTH 3 (THREE) TIMES A DAY WITH MEALS 270 tablet 3    triamcinolone (KENALOG) 0.1 % cream APPLY TOPICALLY 2 TIMES A DAY AS NEEDED FOR RASH. 160 g 5    ziprasidone (GEODON) 80 mg capsule TAKE 1 CAPSULE BY MOUTH EVERY DAY 90 capsule 3    benzonatate (TESSALON PERLES) 100 mg capsule Take 1 capsule (100 mg total) by mouth 3 (three) times a day as needed for cough (Patient not taking: Reported on 3/21/2024) 30 capsule 5    predniSONE 20 mg tablet Take by mouth daily (Patient not taking: Reported on 12/4/2023)      QUEtiapine (SEROquel XR) 400 mg 24 hr tablet Take 1 tablet (400 mg total) by mouth daily at bedtime (Patient not taking: Reported on 3/21/2024) 90 tablet 3    topiramate (Topamax) 50 MG tablet 1/2 TAB at bedtime for 1 week, increase to 1/2 TAB in AM and PM for 1 week, increase to 1/2 TAB in AM and 1 TAB in PM for 1 week, and finish at 1 TAB in AM and PM (Patient not taking: Reported on 3/21/2024) 180 tablet 3     No current facility-administered medications for this visit.     [unfilled]    SOCIAL HISTORY:   reports that she has never smoked. She has been exposed to tobacco smoke. She has never used smokeless tobacco. She reports that she does not currently use alcohol. She reports that she does not currently use drugs.     FAMILY HISTORY:  family history includes Colon cancer in her maternal grandmother; Esophageal cancer in her father; Uterine cancer in her mother.     ALLERGIES:  is allergic to risperdal [risperidone], zyprexa [olanzapine], and latex.      Physical Exam:  Vital Signs:   Visit Vitals  /80 (BP Location: Left arm, Patient Position: Sitting, Cuff Size: Large)   Pulse 102   Temp (!) 97.1 °F (36.2 °C) (Temporal)   Wt 85.3 kg (188 lb)   SpO2 93%    BMI 34.39 kg/m²   OB Status Postmenopausal   Smoking Status Never   BSA 1.86 m²     Body mass index is 34.39 kg/m².  Body surface area is 1.86 meters squared.    GEN: Alert, awake oriented x3, in no acute distress  HEENT- No pallor, icterus, cyanosis, no oral mucosal lesions,   LAD - no palpable cervical, clavicle, axillary, inguinal LAD  Heart- normal S1 S2, regular rate and rhythm, No murmur, rubs.   Lungs- clear breathing sound bilateral.   Abdomen- soft, Non tender, bowel sounds present  Extremities- No cyanosis, clubbing, edema  Neuro- No focal neurological deficit    Labs:  Lab Results   Component Value Date    WBC 3.00 (L) 02/06/2024    HGB 10.9 (L) 02/06/2024    HCT 32.2 (L) 02/06/2024    MCV 86 02/06/2024     02/06/2024     Lab Results   Component Value Date     06/01/2017    SODIUM 131 (L) 02/06/2024    K 3.6 02/06/2024    CL 96 02/06/2024    CO2 29 02/06/2024    AGAP 6 02/06/2024    BUN 9 02/06/2024    CREATININE 0.59 (L) 02/06/2024    GLUC 81 02/06/2024    GLUF 92 01/12/2024    CALCIUM 8.9 02/06/2024    AST 14 02/06/2024    ALT 15 02/06/2024    ALKPHOS 68 02/06/2024    PROT 7.0 06/01/2017    TP 6.2 (L) 02/06/2024    BILITOT 0.2 06/01/2017    TBILI 0.28 02/06/2024    EGFR 97 02/06/2024

## 2024-03-30 ENCOUNTER — HOSPITAL ENCOUNTER (EMERGENCY)
Facility: HOSPITAL | Age: 64
Discharge: HOME | End: 2024-03-30
Attending: EMERGENCY MEDICINE
Payer: COMMERCIAL

## 2024-03-30 VITALS
HEART RATE: 76 BPM | DIASTOLIC BLOOD PRESSURE: 89 MMHG | SYSTOLIC BLOOD PRESSURE: 153 MMHG | OXYGEN SATURATION: 96 % | RESPIRATION RATE: 18 BRPM | TEMPERATURE: 98.7 F

## 2024-03-30 DIAGNOSIS — T78.40XA ALLERGIC REACTION, INITIAL ENCOUNTER: Primary | ICD-10-CM

## 2024-03-30 LAB
ATRIAL RATE: 73 BPM
P AXIS: 80 DEGREES
PR INTERVAL: 134 MS
QRS AXIS: 31 DEGREES
QRSD INTERVAL: 84 MS
QT INTERVAL: 394 MS
QTC INTERVAL: 434 MS
T WAVE AXIS: 79 DEGREES
VENTRICULAR RATE: 73 BPM

## 2024-03-30 PROCEDURE — 93010 ELECTROCARDIOGRAM REPORT: CPT | Performed by: INTERNAL MEDICINE

## 2024-03-30 PROCEDURE — 96375 TX/PRO/DX INJ NEW DRUG ADDON: CPT

## 2024-03-30 PROCEDURE — 99284 EMERGENCY DEPT VISIT MOD MDM: CPT | Performed by: EMERGENCY MEDICINE

## 2024-03-30 PROCEDURE — 99283 EMERGENCY DEPT VISIT LOW MDM: CPT

## 2024-03-30 PROCEDURE — 96376 TX/PRO/DX INJ SAME DRUG ADON: CPT

## 2024-03-30 PROCEDURE — 96374 THER/PROPH/DIAG INJ IV PUSH: CPT

## 2024-03-30 PROCEDURE — 93005 ELECTROCARDIOGRAM TRACING: CPT

## 2024-03-30 RX ORDER — DIPHENHYDRAMINE HYDROCHLORIDE 50 MG/ML
25 INJECTION INTRAMUSCULAR; INTRAVENOUS ONCE
Status: COMPLETED | OUTPATIENT
Start: 2024-03-30 | End: 2024-03-30

## 2024-03-30 RX ORDER — EPINEPHRINE 0.3 MG/.3ML
0.3 INJECTION SUBCUTANEOUS ONCE
Qty: 0.6 ML | Refills: 0 | Status: SHIPPED | OUTPATIENT
Start: 2024-03-30 | End: 2024-03-30

## 2024-03-30 RX ORDER — FAMOTIDINE 20 MG/1
20 TABLET, FILM COATED ORAL ONCE
Status: COMPLETED | OUTPATIENT
Start: 2024-03-30 | End: 2024-03-30

## 2024-03-30 RX ORDER — METHYLPREDNISOLONE SODIUM SUCCINATE 125 MG/2ML
125 INJECTION, POWDER, LYOPHILIZED, FOR SOLUTION INTRAMUSCULAR; INTRAVENOUS ONCE
Status: COMPLETED | OUTPATIENT
Start: 2024-03-30 | End: 2024-03-30

## 2024-03-30 RX ADMIN — FAMOTIDINE 20 MG: 20 TABLET, FILM COATED ORAL at 06:59

## 2024-03-30 RX ADMIN — DIPHENHYDRAMINE HYDROCHLORIDE 25 MG: 50 INJECTION, SOLUTION INTRAMUSCULAR; INTRAVENOUS at 08:28

## 2024-03-30 RX ADMIN — METHYLPREDNISOLONE SODIUM SUCCINATE 125 MG: 125 INJECTION, POWDER, FOR SOLUTION INTRAMUSCULAR; INTRAVENOUS at 06:57

## 2024-03-30 RX ADMIN — DIPHENHYDRAMINE HYDROCHLORIDE 25 MG: 50 INJECTION, SOLUTION INTRAMUSCULAR; INTRAVENOUS at 06:53

## 2024-03-30 NOTE — DISCHARGE INSTRUCTIONS
Return to the ED with any new/concerning issues.     Continue to take Benadryl as needed every 6-8 hours for symptom control.     Follow up with PCP in 2-3 days.

## 2024-03-30 NOTE — ED PROVIDER NOTES
History  Chief Complaint   Patient presents with    Oral Swelling     Pt reports waking with lip and tongue swelling. States is due to latex. Also reports itching all over and dizziness. No SOB or chest pain.      HPI    Patient is a 63-year-old female with total medical problems, presenting to the ED for evaluation of lip and tongue swelling.  Patient states that she has a severe allergy to latex and put a new mattress cover on her bed recently.  She woke this morning with sensation of lip and tongue swelling, as well as diffuse body itching.  No rash.  Ports having some mild difficulty swallowing.  Patient feels that she is having an allergic reaction.  She denies take any medications prior to arrival.  Patient denies any fevers, chills, cough or congestion, chest pain, shortness of breath, abdominal pain, vomiting.    Prior to Admission Medications   Prescriptions Last Dose Informant Patient Reported? Taking?   [facility] Vitamin B-12 1000 MCG tablet  Self No No   Sig: TAKE 1 TABLET BY MOUTH EVERY DAY   LORazepam (ATIVAN) 2 mg tablet  Self No No   Sig: Take 1 tablet (2 mg total) by mouth every 4 (four) hours as needed for anxiety   PARoxetine (PAXIL) 30 mg tablet  Self No No   Sig: Take 2 tablets (60 mg total) by mouth in the morning   QUEtiapine (SEROquel XR) 400 mg 24 hr tablet  Self No No   Sig: Take 1 tablet (400 mg total) by mouth daily at bedtime   Patient not taking: Reported on 3/21/2024   amLODIPine (NORVASC) 5 mg tablet  Self No No   Sig: TAKE 1 TABLET (5 MG TOTAL) BY MOUTH DAILY.   benzonatate (TESSALON PERLES) 100 mg capsule  Self No No   Sig: Take 1 capsule (100 mg total) by mouth 3 (three) times a day as needed for cough   Patient not taking: Reported on 3/21/2024   celecoxib (CeleBREX) 200 mg capsule  Self No No   Sig: Take 1 capsule (200 mg total) by mouth daily   ferrous sulfate 325 (65 Fe) mg tablet   No No   Sig: Take 1 tablet (325 mg total) by mouth daily with breakfast   folic acid (FOLVITE) 400 mcg  tablet   No No   Sig: Take 1 tablet (400 mcg total) by mouth daily   levothyroxine 25 mcg tablet  Self No No   Sig: TAKE 1 TABLET BY MOUTH EVERY DAY   ondansetron (ZOFRAN) 4 mg tablet  Self No No   Sig: Take 1 tablet (4 mg total) by mouth every 6 (six) hours   pantoprazole (PROTONIX) 40 mg tablet  Self No No   Sig: TAKE 1 TABLET BY MOUTH TWICE A DAY   predniSONE 20 mg tablet  Self Yes No   Sig: Take by mouth daily   Patient not taking: Reported on 2023   sucralfate (CARAFATE) 1 g tablet  Self No No   Sig: TAKE 1 TABLET (1 G TOTAL) BY MOUTH 3 (THREE) TIMES A DAY WITH MEALS   topiramate (Topamax) 50 MG tablet  Self No No   Si/2 TAB at bedtime for 1 week, increase to 1/2 TAB in AM and PM for 1 week, increase to 1/2 TAB in AM and 1 TAB in PM for 1 week, and finish at 1 TAB in AM and PM   Patient not taking: Reported on 3/21/2024   triamcinolone (KENALOG) 0.1 % cream  Self No No   Sig: APPLY TOPICALLY 2 TIMES A DAY AS NEEDED FOR RASH.   ziprasidone (GEODON) 80 mg capsule  Self No No   Sig: TAKE 1 CAPSULE BY MOUTH EVERY DAY      Facility-Administered Medications: None       Past Medical History:   Diagnosis Date    Anemia     Anxiety     Arthritis     Back pain at L4-L5 level     Schreiber esophagus     Bulimia     Colon polyp     Disease of thyroid gland     hypothyroid    GERD (gastroesophageal reflux disease)     Hearing loss in left ear     History of transfusion     Hyperlipidemia     Hypertension     Hypothyroidism     Leukopenia     NMS (neuroleptic malignant syndrome) 2020    Pneumonia     RA (rheumatoid arthritis) (HCC)     in feet    Sciatica     Seizure (HCC)     due to medication mix up 2018 only one historically    Skin spots, red     lower right arm - poss from cat- no s/s infection    Synovial cyst of lumbar spine     Vertigo     Wears dentures     full set    Weight gain        Past Surgical History:   Procedure Laterality Date    BONE MARROW BIOPSY      BREAST SURGERY      enlargement with  implants    CLOSED REDUCTION DISTAL FEMUR FRACTURE      COLONOSCOPY      COSMETIC SURGERY      DENTAL SURGERY      HIP ARTHROPLASTY Right 01/02/2020    Procedure: REVISION TOTAL HIP ARTHROPLASTY WITH REPAIR OF PARIPROSTHETIC FRACTURE - POSTERIOR APPROACH;  Surgeon: Dilan Pedersen MD;  Location: [facility];  Service: Orthopedics    MS AMPUTATION METATARSAL W/TOE SINGLE Left 04/07/2023    Procedure: AMPUTATION TOE 4th;  Surgeon: Conner Bartlett DPM;  Location:  MAIN OR;  Service: Podiatry    MS ARTHRP ACETBLR/PROX FEM PROSTC AGRFT/ALGRFT Right 12/11/2019    Procedure: ARTHROPLASTY HIP TOTAL ANTERIOR;  Surgeon: Dilan Pedersen MD;  Location: [facility];  Service: Orthopedics    MS ESOPHAGOGASTRODUODENOSCOPY TRANSORAL DIAGNOSTIC N/A 05/11/2021    Procedure: ESOPHAGOGASTRODUODENOSCOPY (EGD);  Surgeon: David Cedeño MD;  Location: [facility];  Service: General    MS LAPS RPR PARAESPHGL HRNA INCL FUNDPLSTY W/MESH N/A 05/11/2021    Procedure: REPAIR HERNIA PARAESOPHAGEAL  LAPAROSCOPIC;  Surgeon: David Cedeño MD;  Location: [facility];  Service: General    MS UNLISTED PROCEDURE ESOPHAGUS N/A 05/11/2021    Procedure: FUNDOPLICATION TRANSORAL INCISIONLESS;  Surgeon: Brad Cadet MD;  Location: [facility];  Service: Gastroenterology    RHINOPLASTY      SINUS SURGERY      TOE AMPUTATION Left     2023 4th toe    TRANSORAL INCISIONLESS FUNDOPLICATION (TIF)  05/11/2021       Family History   Problem Relation Age of Onset    Uterine cancer Mother     Esophageal cancer Father     Colon cancer Maternal Grandmother      I have reviewed and agree with the history as documented.    E-Cigarette/Vaping    E-Cigarette Use Never User      E-Cigarette/Vaping Substances    Nicotine No     THC No     CBD No     Flavoring No     Other No     Unknown No      Social History     Tobacco Use    Smoking status: Never     Passive exposure: Past    Smokeless tobacco: Never   Vaping Use    Vaping status: Never Used   Substance Use Topics     Alcohol use: Not Currently    Drug use: Not Currently        Review of Systems   All other systems reviewed and are negative.      Physical Exam  ED Triage Vitals   Temperature Pulse Respirations Blood Pressure SpO2   03/30/24 0638 03/30/24 0636 03/30/24 0636 03/30/24 0636 03/30/24 0636   98.7 °F (37.1 °C) 79 18 162/97 93 %      Temp Source Heart Rate Source Patient Position - Orthostatic VS BP Location FiO2 (%)   03/30/24 0638 03/30/24 0636 03/30/24 0636 03/30/24 0636 --   Oral Monitor Lying Left arm       Pain Score       --                    Orthostatic Vital Signs  Vitals:    03/30/24 0636 03/30/24 0715   BP: 162/97    Pulse: 79 72   Patient Position - Orthostatic VS: Lying        Physical Exam  Vitals and nursing note reviewed.   Constitutional:       General: She is not in acute distress.     Appearance: Normal appearance. She is well-developed and normal weight. She is not ill-appearing or toxic-appearing.   HENT:      Head: Normocephalic and atraumatic.      Right Ear: External ear normal.      Left Ear: External ear normal.      Nose: Nose normal. No congestion or rhinorrhea.      Mouth/Throat:      Mouth: Mucous membranes are moist.      Pharynx: Oropharynx is clear. No oropharyngeal exudate or posterior oropharyngeal erythema.      Comments: Patient does not have any tongue swelling.  The posterior palate and posterior oropharynx are clearly visible without any uvular edema, or tonsillar swelling.  The floor the mouth is soft.  Patient's lips are not swollen.  Eyes:      Conjunctiva/sclera: Conjunctivae normal.   Cardiovascular:      Rate and Rhythm: Normal rate and regular rhythm.      Pulses: Normal pulses.      Heart sounds: Normal heart sounds. No murmur heard.  Pulmonary:      Effort: Pulmonary effort is normal. No respiratory distress.      Breath sounds: Normal breath sounds. No wheezing, rhonchi or rales.      Comments: Patient is able to speak in full sentences.  No difficulty swallowing.  She  is not hypoxic.  Abdominal:      General: Abdomen is flat.      Palpations: Abdomen is soft.      Tenderness: There is no abdominal tenderness.   Musculoskeletal:         General: No swelling. Normal range of motion.      Cervical back: Normal range of motion and neck supple. No tenderness.   Lymphadenopathy:      Cervical: No cervical adenopathy.   Skin:     General: Skin is warm and dry.      Capillary Refill: Capillary refill takes less than 2 seconds.      Findings: No erythema or rash.   Neurological:      General: No focal deficit present.      Mental Status: She is alert and oriented to person, place, and time.      Cranial Nerves: No cranial nerve deficit.      Motor: No weakness.   Psychiatric:         Mood and Affect: Mood normal.         ED Medications  Medications   diphenhydrAMINE (BENADRYL) injection 25 mg (has no administration in time range)   diphenhydrAMINE (BENADRYL) injection 25 mg (25 mg Intravenous Given 3/30/24 0653)   methylPREDNISolone sodium succinate (Solu-MEDROL) injection 125 mg (125 mg Intravenous Given 3/30/24 0657)   famotidine (PEPCID) tablet 20 mg (20 mg Oral Given 3/30/24 0659)       Diagnostic Studies  Results Reviewed       None                   No orders to display         Procedures  Procedures      ED Course  ED Course as of 03/30/24 0810   Sat Mar 30, 2024   0707 EKG: NSR at 73 bpm, normal axis, normal intervals, no acute ischemic changes as interpreted by me   0800 On reevaluation, patient reports slight improvement in her symptoms.  She continues to be able to talk in full sentences without dyspnea, no hypoxia.  Patient does not have any evidence of worsening rash or any tongue swelling, uvular edema.  Will continue to observe for further improvement.     Patient is a 63-year-old female presenting to the ED for evaluation of an allergic reaction.  History and clinical exam documented above.  Do not appreciate anaphylaxis on exam.  She is able to speak in full sentences,  has no difficulty swallowing, and is not hypoxic.  Will treat patient's symptoms with Benadryl, Solu-Medrol, Pepcid, in accordance with mild allergic reaction..  Will frequently reassess.  Patient likely can be discharged home after symptomatic improvement.  No clinical indication for lab work.    See reevaluations above in ED course.  Care overturned to Dr. Haley pending further observation, ultimate disposition.        Medical Decision Making  Risk  Prescription drug management.          Disposition  Final diagnoses:   Allergic reaction, initial encounter     Time reflects when diagnosis was documented in both MDM as applicable and the Disposition within this note       Time User Action Codes Description Comment    3/30/2024  8:07 AM Kole Elias Add [T78.40XA] Allergic reaction, initial encounter           ED Disposition       None          Follow-up Information    None         Patient's Medications   Discharge Prescriptions    No medications on file     No discharge procedures on file.    PDMP Review         Value Time User    PDMP Reviewed  Yes 3/21/2024  9:27 AM Raheem Cain MD             ED Provider  Attending physically available and evaluated Irene Plascencia. I managed the patient along with the ED Attending.    Electronically Signed by           Kole Elias DO  03/30/24 0847

## 2024-03-30 NOTE — ED CARE HANDOFF
Emergency Department Sign Out Note        Sign out and transfer of care from night team. See Separate Emergency Department note.     The patient, Irene Plascencia, was evaluated by the previous provider for allergic reaction.    Workup Completed:  Treated with Benadryl, famotidine, Solu-Medrol    ED Course / Workup Pending (followup):                                    ED Course as of 03/30/24 0930   Sat Mar 30, 2024   0700 SO: cc allergic rxn to latex tongue swelling feeling, pruritus; dispo reevaluate after tx then d/c   0800 Reassessed patient.  She states that the medications helped a little bit, but she still feels a bit itchy and like her mouth is not completely back to normal.  She also tells me that she historically is very tolerant to Benadryl.  I will give an additional dose and reassess 1 hour following administration.   0928 Reassessed patient.  She states that she is feeling much better after the additional dose of Benadryl.  She is tolerating liquids without issue and denies any shortness of breath.  Upon repeat examination, she continues to have no oropharyngeal or lingual swelling.  Will plan to discharge with EpiPen prescription and primary care follow-up as needed given that patient already has established latex allergy. Patient voiced understanding of the plan and all questions were answered. Strict return precautions given. Patient is hemodynamically stable and safe for discharge at this time.     Procedures  Medical Decision Making  Risk  Prescription drug management.            Disposition  Final diagnoses:   Allergic reaction, initial encounter     Time reflects when diagnosis was documented in both MDM as applicable and the Disposition within this note       Time User Action Codes Description Comment    3/30/2024  8:07 AM Kole Elias Add [T78.40XA] Allergic reaction, initial encounter           ED Disposition       ED Disposition   Discharge    Condition   Stable    Date/Time   Sat Mar  30, 2024  9:30 AM    Comment   Irene Plascencia discharge to home/self care.                   Follow-up Information    None       Patient's Medications   Discharge Prescriptions    EPINEPHRINE (EPIPEN) 0.3 MG/0.3 ML SOAJ    Inject 0.3 mL (0.3 mg total) into a muscle once for 1 dose       Start Date: 3/30/2024 End Date: 3/30/2024       Order Dose: 0.3 mg       Quantity: 0.6 mL    Refills: 0     No discharge procedures on file.       ED Provider  Electronically Signed by     Samuel Haley DO  03/30/24 0931

## 2024-04-01 ENCOUNTER — OFFICE VISIT (OUTPATIENT)
Dept: INTERNAL MEDICINE CLINIC | Facility: CLINIC | Age: 64
End: 2024-04-01
Payer: COMMERCIAL

## 2024-04-01 VITALS
BODY MASS INDEX: 33.73 KG/M2 | HEART RATE: 90 BPM | WEIGHT: 184.4 LBS | OXYGEN SATURATION: 96 % | DIASTOLIC BLOOD PRESSURE: 82 MMHG | SYSTOLIC BLOOD PRESSURE: 126 MMHG

## 2024-04-01 DIAGNOSIS — L29.9 PRURITUS: Primary | ICD-10-CM

## 2024-04-01 PROCEDURE — 99213 OFFICE O/P EST LOW 20 MIN: CPT | Performed by: INTERNAL MEDICINE

## 2024-04-01 PROCEDURE — G2211 COMPLEX E/M VISIT ADD ON: HCPCS | Performed by: INTERNAL MEDICINE

## 2024-04-01 NOTE — PATIENT INSTRUCTIONS
Problem List Items Addressed This Visit          Musculoskeletal and Integument    Pruritus - Primary     Possibly secondary to latex mattress pad.  Patient was given Benadryl for this.  No tongue or lip swelling, no airway compromise, no wheezing, no stridor.  Patient can use some Allegra going forward as needed which is available over-the-counter

## 2024-04-01 NOTE — ASSESSMENT & PLAN NOTE
Possibly secondary to latex mattress pad.  Patient was given Benadryl for this.  No tongue or lip swelling, no airway compromise, no wheezing, no stridor.  Patient can use some Allegra going forward as needed which is available over-the-counter

## 2024-04-01 NOTE — PROGRESS NOTES
Name: Irene Plascencia      : 1960      MRN: [id number]  Encounter Provider: Raheem Cain MD  Encounter Date: 2024   Encounter department: [facility]    Assessment & Plan     1. Pruritus  Assessment & Plan:  Possibly secondary to latex mattress pad.  Patient was given Benadryl for this.  No tongue or lip swelling, no airway compromise, no wheezing, no stridor.  Patient can use some Allegra going forward as needed which is available over-the-counter             Subjective     She went to the emergency room over the weekend for allergic reaction to be secondary to Lasix patient just got a mattress pad with latex in it.  She was given benadryl.    Allergic Reaction  Pertinent negatives include no chest pain or trouble swallowing.     Review of Systems   Constitutional:  Negative for chills and fever.   HENT:  Negative for trouble swallowing.    Respiratory:  Negative for shortness of breath.    Cardiovascular:  Negative for chest pain.       Past Medical History:   Diagnosis Date   • Anemia    • Anxiety    • Arthritis    • Back pain at L4-L5 level    • Schreiber esophagus    • Bulimia    • Colon polyp    • Disease of thyroid gland     hypothyroid   • GERD (gastroesophageal reflux disease)    • Hearing loss in left ear    • History of transfusion    • Hyperlipidemia    • Hypertension    • Hypothyroidism    • Leukopenia    • NMS (neuroleptic malignant syndrome) 2020   • Pneumonia    • RA (rheumatoid arthritis) (HCC)     in feet   • Sciatica    • Seizure (HCC)     due to medication mix up 2018 only one historically   • Skin spots, red     lower right arm - poss from cat- no s/s infection   • Synovial cyst of lumbar spine    • Vertigo    • Wears dentures     full set   • Weight gain      Past Surgical History:   Procedure Laterality Date   • BONE MARROW BIOPSY     • BREAST SURGERY      enlargement with implants   • CLOSED REDUCTION DISTAL FEMUR FRACTURE     • COLONOSCOPY      • COSMETIC SURGERY     • DENTAL SURGERY     • HIP ARTHROPLASTY Right 01/02/2020    Procedure: REVISION TOTAL HIP ARTHROPLASTY WITH REPAIR OF PARIPROSTHETIC FRACTURE - POSTERIOR APPROACH;  Surgeon: Dilan Pedersen MD;  Location: [facility];  Service: Orthopedics   • AZ AMPUTATION METATARSAL W/TOE SINGLE Left 04/07/2023    Procedure: AMPUTATION TOE 4th;  Surgeon: Conner Bartlett DPM;  Location:  MAIN OR;  Service: Podiatry   • AZ ARTHRP ACETBLR/PROX FEM PROSTC AGRFT/ALGRFT Right 12/11/2019    Procedure: ARTHROPLASTY HIP TOTAL ANTERIOR;  Surgeon: Dilan Pedersen MD;  Location: [facility];  Service: Orthopedics   • AZ ESOPHAGOGASTRODUODENOSCOPY TRANSORAL DIAGNOSTIC N/A 05/11/2021    Procedure: ESOPHAGOGASTRODUODENOSCOPY (EGD);  Surgeon: David Cedeño MD;  Location: [facility];  Service: General   • AZ LAPS RPR PARAESPHGL HRNA INCL FUNDPLSTY W/MESH N/A 05/11/2021    Procedure: REPAIR HERNIA PARAESOPHAGEAL  LAPAROSCOPIC;  Surgeon: David Cedeño MD;  Location: [facility];  Service: General   • AZ UNLISTED PROCEDURE ESOPHAGUS N/A 05/11/2021    Procedure: FUNDOPLICATION TRANSORAL INCISIONLESS;  Surgeon: Brad Cadet MD;  Location: [facility];  Service: Gastroenterology   • RHINOPLASTY     • SINUS SURGERY     • TOE AMPUTATION Left     2023 4th toe   • TRANSORAL INCISIONLESS FUNDOPLICATION (TIF)  05/11/2021     Family History   Problem Relation Age of Onset   • Uterine cancer Mother    • Esophageal cancer Father    • Colon cancer Maternal Grandmother      Social History     Socioeconomic History   • Marital status: Single     Spouse name: None   • Number of children: None   • Years of education: None   • Highest education level: None   Occupational History   • None   Tobacco Use   • Smoking status: Never     Passive exposure: Past   • Smokeless tobacco: Never   Vaping Use   • Vaping status: Never Used   Substance and Sexual Activity   • Alcohol use: Not Currently   • Drug use: Not Currently   • Sexual activity: Not  Currently   Other Topics Concern   • None   Social History Narrative    Family disruption death of family member parent, per allscripts     Social Determinants of Health     Financial Resource Strain: Medium Risk (1/4/2023)    Overall Financial Resource Strain (CARDIA)    • Difficulty of Paying Living Expenses: Somewhat hard   Food Insecurity: Food Insecurity Present (3/13/2024)    Hunger Vital Sign    • Worried About Running Out of Food in the Last Year: Sometimes true    • Ran Out of Food in the Last Year: Sometimes true   Transportation Needs: No Transportation Needs (3/13/2024)    PRAPARE - Transportation    • Lack of Transportation (Medical): No    • Lack of Transportation (Non-Medical): No   Physical Activity: Not on file   Stress: Stress Concern Present (5/18/2021)    [employer] - Occupational Stress Questionnaire    • Feeling of Stress : To some extent   Social Connections: Not on file   Intimate Partner Violence: Not on file   Housing Stability: Low Risk  (3/13/2024)    Housing Stability Vital Sign    • Unable to Pay for Housing in the Last Year: No    • Number of Places Lived in the Last Year: 1    • Unstable Housing in the Last Year: No     Current Outpatient Medications on File Prior to Visit   Medication Sig   • amLODIPine (NORVASC) 5 mg tablet TAKE 1 TABLET (5 MG TOTAL) BY MOUTH DAILY.   • benzonatate (TESSALON PERLES) 100 mg capsule Take 1 capsule (100 mg total) by mouth 3 (three) times a day as needed for cough   • celecoxib (CeleBREX) 200 mg capsule Take 1 capsule (200 mg total) by mouth daily   • [facility] Vitamin B-12 1000 MCG tablet TAKE 1 TABLET BY MOUTH EVERY DAY   • ferrous sulfate 325 (65 Fe) mg tablet Take 1 tablet (325 mg total) by mouth daily with breakfast   • folic acid (FOLVITE) 400 mcg tablet Take 1 tablet (400 mcg total) by mouth daily   • levothyroxine 25 mcg tablet TAKE 1 TABLET BY MOUTH EVERY DAY   • LORazepam (ATIVAN) 2 mg tablet Take 1 tablet (2 mg total)  by mouth every 4 (four) hours as needed for anxiety   • ondansetron (ZOFRAN) 4 mg tablet Take 1 tablet (4 mg total) by mouth every 6 (six) hours   • pantoprazole (PROTONIX) 40 mg tablet TAKE 1 TABLET BY MOUTH TWICE A DAY   • PARoxetine (PAXIL) 30 mg tablet Take 2 tablets (60 mg total) by mouth in the morning   • sucralfate (CARAFATE) 1 g tablet TAKE 1 TABLET (1 G TOTAL) BY MOUTH 3 (THREE) TIMES A DAY WITH MEALS   • triamcinolone (KENALOG) 0.1 % cream APPLY TOPICALLY 2 TIMES A DAY AS NEEDED FOR RASH.   • ziprasidone (GEODON) 80 mg capsule TAKE 1 CAPSULE BY MOUTH EVERY DAY   • EPINEPHrine (EPIPEN) 0.3 mg/0.3 mL SOAJ Inject 0.3 mL (0.3 mg total) into a muscle once for 1 dose (Patient not taking: Reported on 4/1/2024)   • predniSONE 20 mg tablet Take by mouth daily (Patient not taking: Reported on 12/4/2023)   • QUEtiapine (SEROquel XR) 400 mg 24 hr tablet Take 1 tablet (400 mg total) by mouth daily at bedtime (Patient not taking: Reported on 3/21/2024)   • topiramate (Topamax) 50 MG tablet 1/2 TAB at bedtime for 1 week, increase to 1/2 TAB in AM and PM for 1 week, increase to 1/2 TAB in AM and 1 TAB in PM for 1 week, and finish at 1 TAB in AM and PM (Patient not taking: Reported on 3/21/2024)     Allergies   Allergen Reactions   • Risperdal [Risperidone] Shortness Of Breath     Rapid heart beat, SOB   • Zyprexa [Olanzapine] Shortness Of Breath     Rapid heartbeat   • Latex Rash and Tongue Swelling     Band aids, adhesives wears normal underwear, can eat bananas  USE PAPER TAPE     Immunization History   Administered Date(s) Administered   • COVID-19 PFIZER VACCINE 0.3 ML IM 03/20/2021, 04/10/2021, 10/09/2021   • COVID-19 Pfizer Vac BIVALENT Jt-sucrose 12 Yr+ IM 09/10/2022   • COVID-19 Pfizer mRNA vacc PF jt-sucrose 12 yr and older (Comirnaty) 10/04/2023   • COVID-19 Pfizer vac (Jt-sucrose, gray cap) 12 yr+ IM 05/30/2022   • H1N1, All Formulations 11/17/2009   • INFLUENZA 11/17/2009, 01/04/2013, 11/11/2014,  10/20/2015, 10/31/2016, 09/16/2018, 11/07/2022   • Influenza Injectable, MDCK, Preservative Free, Quadrivalent, 0.5 mL 09/26/2019   • Influenza Quadrivalent, 6-35 Months IM 10/31/2016   • Influenza, injectable, quadrivalent, preservative free 0.5 mL 09/05/2020, 09/26/2023   • Influenza, recombinant, quadrivalent,injectable, preservative free 11/04/2021   • Pneumococcal Conjugate 13-Valent 09/16/2018   • Rabies 07/29/2014, 08/01/2014, 08/05/2014   • Tdap 07/29/2014, 12/17/2022, 06/16/2023       Objective     /82   Pulse 90   Wt 83.6 kg (184 lb 6.4 oz)   SpO2 96%   BMI 33.73 kg/m²     Physical Exam  HENT:      Mouth/Throat:      Comments: No tongue or lip swelling, uvula not swollen  Pulmonary:      Effort: Pulmonary effort is normal. No respiratory distress.      Breath sounds: Normal breath sounds. No stridor. No wheezing.   Neurological:      Mental Status: She is alert.       Raheem Cain MD

## 2024-04-05 DIAGNOSIS — K22.10 EROSIVE ESOPHAGITIS: ICD-10-CM

## 2024-04-05 RX ORDER — SUCRALFATE 1 G/1
1 TABLET ORAL
Qty: 270 TABLET | Refills: 1 | Status: SHIPPED | OUTPATIENT
Start: 2024-04-05

## 2024-04-06 DIAGNOSIS — D51.8 VITAMIN B12 DEFICIENCY (DIETARY) ANEMIA: ICD-10-CM

## 2024-04-06 RX ORDER — OMEGA-3/DHA/EPA/FISH OIL 35-113.5MG
1000 TABLET,CHEWABLE ORAL DAILY
Qty: 90 TABLET | Refills: 1 | Status: SHIPPED | OUTPATIENT
Start: 2024-04-06

## 2024-04-07 NOTE — ED ATTENDING ATTESTATION
3/30/2024  IMira MD, saw and evaluated the patient. I have discussed the patient with the resident/non-physician practitioner and agree with the resident's/non-physician practitioner's findings, Plan of Care, and MDM as documented in the resident's/non-physician practitioner's note, except where noted. All available labs and Radiology studies were reviewed.  I was present for key portions of any procedure(s) performed by the resident/non-physician practitioner and I was immediately available to provide assistance.       At this point I agree with the current assessment done in the Emergency Department.  I have conducted an independent evaluation of this patient a history and physical is as follows:    60-year-old female with a significant medical history for multiple psychiatric illnesses presents for evaluation due to lip and tongue swelling.  Patient reports that she has a new mattress pad which she believes that has latex in it.  This morning she woke up feeling like her tongue and her lips are swollen.  Patient also endorsed diffuse pruritus.  On evaluation patient does not have any rashes, hives, angioedema, no uvular edema or tonsillar swelling.  Patient does not have any signs of airway compromise.  Patient is able to speak in full sentences and has no difficulty swallowing.  Patient is not hypoxic.  Will treat patient with Benadryl, Solu-Medrol and Pepcid due to the pruritus.  Will monitor patient in the emergency room.  Will sign out patient to oncoming physician for reevaluation and final disposition.    ED Course         Critical Care Time  Procedures

## 2024-04-10 ENCOUNTER — TELEPHONE (OUTPATIENT)
Age: 64
End: 2024-04-10

## 2024-04-10 DIAGNOSIS — M54.16 LUMBAR RADICULOPATHY: ICD-10-CM

## 2024-04-10 RX ORDER — CELECOXIB 200 MG/1
200 CAPSULE ORAL DAILY
Qty: 30 CAPSULE | Refills: 5 | Status: SHIPPED | OUTPATIENT
Start: 2024-04-10

## 2024-04-18 ENCOUNTER — HOSPITAL ENCOUNTER (EMERGENCY)
Facility: HOSPITAL | Age: 64
Discharge: HOME | End: 2024-04-18
Attending: EMERGENCY MEDICINE | Admitting: EMERGENCY MEDICINE
Payer: COMMERCIAL

## 2024-04-18 VITALS
RESPIRATION RATE: 18 BRPM | HEART RATE: 84 BPM | DIASTOLIC BLOOD PRESSURE: 64 MMHG | TEMPERATURE: 97.4 F | OXYGEN SATURATION: 96 % | SYSTOLIC BLOOD PRESSURE: 147 MMHG

## 2024-04-18 DIAGNOSIS — T78.40XA ALLERGIC REACTION, INITIAL ENCOUNTER: Primary | ICD-10-CM

## 2024-04-18 PROCEDURE — 96375 TX/PRO/DX INJ NEW DRUG ADDON: CPT

## 2024-04-18 PROCEDURE — 93005 ELECTROCARDIOGRAM TRACING: CPT

## 2024-04-18 PROCEDURE — 96372 THER/PROPH/DIAG INJ SC/IM: CPT

## 2024-04-18 PROCEDURE — 96374 THER/PROPH/DIAG INJ IV PUSH: CPT

## 2024-04-18 PROCEDURE — 99284 EMERGENCY DEPT VISIT MOD MDM: CPT | Performed by: EMERGENCY MEDICINE

## 2024-04-18 PROCEDURE — 99283 EMERGENCY DEPT VISIT LOW MDM: CPT

## 2024-04-18 RX ORDER — FAMOTIDINE 10 MG/ML
20 INJECTION, SOLUTION INTRAVENOUS ONCE
Status: COMPLETED | OUTPATIENT
Start: 2024-04-18 | End: 2024-04-18

## 2024-04-18 RX ORDER — EPINEPHRINE 1 MG/ML
0.3 INJECTION, SOLUTION, CONCENTRATE INTRAVENOUS ONCE
Status: COMPLETED | OUTPATIENT
Start: 2024-04-18 | End: 2024-04-18

## 2024-04-18 RX ORDER — EPINEPHRINE 0.3 MG/.3ML
0.3 INJECTION SUBCUTANEOUS ONCE
Qty: 0.6 ML | Refills: 0 | Status: ON HOLD | OUTPATIENT
Start: 2024-04-18 | End: 2024-04-18

## 2024-04-18 RX ORDER — DIPHENHYDRAMINE HYDROCHLORIDE 50 MG/ML
25 INJECTION INTRAMUSCULAR; INTRAVENOUS ONCE
Status: COMPLETED | OUTPATIENT
Start: 2024-04-18 | End: 2024-04-18

## 2024-04-18 RX ORDER — METHYLPREDNISOLONE SODIUM SUCCINATE 125 MG/2ML
125 INJECTION, POWDER, LYOPHILIZED, FOR SOLUTION INTRAMUSCULAR; INTRAVENOUS ONCE
Status: COMPLETED | OUTPATIENT
Start: 2024-04-18 | End: 2024-04-18

## 2024-04-18 RX ADMIN — EPINEPHRINE 0.3 MG: 1 INJECTION, SOLUTION, CONCENTRATE INTRAVENOUS at 11:02

## 2024-04-18 RX ADMIN — METHYLPREDNISOLONE SODIUM SUCCINATE 125 MG: 125 INJECTION, POWDER, FOR SOLUTION INTRAMUSCULAR; INTRAVENOUS at 11:12

## 2024-04-18 RX ADMIN — FAMOTIDINE 20 MG: 10 INJECTION INTRAVENOUS at 11:20

## 2024-04-18 RX ADMIN — DIPHENHYDRAMINE HYDROCHLORIDE 25 MG: 50 INJECTION, SOLUTION INTRAMUSCULAR; INTRAVENOUS at 11:10

## 2024-04-18 NOTE — DISCHARGE INSTRUCTIONS
Please avoid all products containing latex as this may exacerbate your symptoms.  Should your symptoms recur or worsen please use your EpiPen autoinjector as directed and call 911.

## 2024-04-18 NOTE — ED PROVIDER NOTES
History  Chief Complaint   Patient presents with    Allergic Reaction     Pt states that she is allergic to latex and that she bought a new mattress pad, washed it and then started to feeling symptoms.        History provided by:  Patient   used: No    Allergic Reaction  Presenting symptoms: difficulty breathing, difficulty swallowing and itching    Presenting symptoms: no rash, no swelling and no wheezing    Severity:  Moderate  Duration:  1 hour  Prior episodes: Patient has no latex allergy.  Patient states she was washing and placed a latex mattress pad and dryer with latex particles were aerosolized causing symptoms.  Context: chemicals    Context: not animal exposure, not cosmetics, not dairy/milk products, not eggs, not food allergies, not grass, not insect bite/sting, not jewelry/metal, not medications, not new detergents/soaps, not nuts and not poison ivy    Relieved by:  Nothing  Worsened by:  Nothing  Ineffective treatments:  None tried      Prior to Admission Medications   Prescriptions Last Dose Informant Patient Reported? Taking?   [facility] Vitamin B-12 1000 MCG tablet   No No   Sig: TAKE 1 TABLET BY MOUTH EVERY DAY   EPINEPHrine (EPIPEN) 0.3 mg/0.3 mL SOAJ   No No   Sig: Inject 0.3 mL (0.3 mg total) into a muscle once for 1 dose   Patient not taking: Reported on 4/1/2024   EPINEPHrine (EPIPEN) 0.3 mg/0.3 mL SOAJ   No Yes   Sig: Inject 0.3 mL (0.3 mg total) into a muscle once for 1 dose   LORazepam (ATIVAN) 2 mg tablet  Self No No   Sig: Take 1 tablet (2 mg total) by mouth every 4 (four) hours as needed for anxiety   PARoxetine (PAXIL) 30 mg tablet  Self No No   Sig: Take 2 tablets (60 mg total) by mouth in the morning   QUEtiapine (SEROquel XR) 400 mg 24 hr tablet  Self No No   Sig: Take 1 tablet (400 mg total) by mouth daily at bedtime   Patient not taking: Reported on 3/21/2024   amLODIPine (NORVASC) 5 mg tablet  Self No No   Sig: TAKE 1 TABLET (5 MG TOTAL) BY MOUTH DAILY.    benzonatate (TESSALON PERLES) 100 mg capsule  Self No No   Sig: Take 1 capsule (100 mg total) by mouth 3 (three) times a day as needed for cough   celecoxib (CeleBREX) 200 mg capsule   No No   Sig: Take 1 capsule (200 mg total) by mouth daily   ferrous sulfate 325 (65 Fe) mg tablet   No No   Sig: Take 1 tablet (325 mg total) by mouth daily with breakfast   folic acid (FOLVITE) 400 mcg tablet   No No   Sig: Take 1 tablet (400 mcg total) by mouth daily   levothyroxine 25 mcg tablet  Self No No   Sig: TAKE 1 TABLET BY MOUTH EVERY DAY   ondansetron (ZOFRAN) 4 mg tablet  Self No No   Sig: Take 1 tablet (4 mg total) by mouth every 6 (six) hours   pantoprazole (PROTONIX) 40 mg tablet  Self No No   Sig: TAKE 1 TABLET BY MOUTH TWICE A DAY   predniSONE 20 mg tablet  Self Yes No   Sig: Take by mouth daily   Patient not taking: Reported on 2023   sucralfate (CARAFATE) 1 g tablet   No No   Sig: TAKE 1 TABLET (1 G TOTAL) BY MOUTH 3 (THREE) TIMES A DAY WITH MEALS   topiramate (Topamax) 50 MG tablet  Self No No   Si/2 TAB at bedtime for 1 week, increase to 1/2 TAB in AM and PM for 1 week, increase to 1/2 TAB in AM and 1 TAB in PM for 1 week, and finish at 1 TAB in AM and PM   Patient not taking: Reported on 3/21/2024   triamcinolone (KENALOG) 0.1 % cream  Self No No   Sig: APPLY TOPICALLY 2 TIMES A DAY AS NEEDED FOR RASH.   ziprasidone (GEODON) 80 mg capsule  Self No No   Sig: TAKE 1 CAPSULE BY MOUTH EVERY DAY      Facility-Administered Medications: None       Past Medical History:   Diagnosis Date    Anemia     Anxiety     Arthritis     Back pain at L4-L5 level     Schreiber esophagus     Bulimia     Colon polyp     Disease of thyroid gland     hypothyroid    GERD (gastroesophageal reflux disease)     Hearing loss in left ear     History of transfusion     Hyperlipidemia     Hypertension     Hypothyroidism     Leukopenia     NMS (neuroleptic malignant syndrome) 2020    Pneumonia     RA (rheumatoid arthritis) ([facility])      in feet    Sciatica     Seizure ([facility])     due to medication mix up 8/2018 only one historically    Skin spots, red     lower right arm - poss from cat- no s/s infection    Synovial cyst of lumbar spine     Vertigo     Wears dentures     full set    Weight gain        Past Surgical History:   Procedure Laterality Date    BONE MARROW BIOPSY      BREAST SURGERY      enlargement with implants    CLOSED REDUCTION DISTAL FEMUR FRACTURE      COLONOSCOPY      COSMETIC SURGERY      DENTAL SURGERY      HIP ARTHROPLASTY Right 01/02/2020    Procedure: REVISION TOTAL HIP ARTHROPLASTY WITH REPAIR OF PARIPROSTHETIC FRACTURE - POSTERIOR APPROACH;  Surgeon: Dilan Pedersen MD;  Location: [facility];  Service: Orthopedics    DE AMPUTATION METATARSAL W/TOE SINGLE Left 04/07/2023    Procedure: AMPUTATION TOE 4th;  Surgeon: Conner Bartlett DPM;  Location: [facility];  Service: Podiatry    DE ARTHRP ACETBLR/PROX FEM PROSTC AGRFT/ALGRFT Right 12/11/2019    Procedure: ARTHROPLASTY HIP TOTAL ANTERIOR;  Surgeon: Dilan Pedersen MD;  Location: [facility];  Service: Orthopedics    DE ESOPHAGOGASTRODUODENOSCOPY TRANSORAL DIAGNOSTIC N/A 05/11/2021    Procedure: ESOPHAGOGASTRODUODENOSCOPY (EGD);  Surgeon: David Cedeño MD;  Location: [facility];  Service: General    DE LAPS RPR PARAESPHGL HRNA INCL FUNDPLSTY W/MESH N/A 05/11/2021    Procedure: REPAIR HERNIA PARAESOPHAGEAL  LAPAROSCOPIC;  Surgeon: David Cedeño MD;  Location: [facility];  Service: General    DE UNLISTED PROCEDURE ESOPHAGUS N/A 05/11/2021    Procedure: FUNDOPLICATION TRANSORAL INCISIONLESS;  Surgeon: Brad Cadet MD;  Location: [facility];  Service: Gastroenterology    RHINOPLASTY      SINUS SURGERY      TOE AMPUTATION Left     2023 4th toe    TRANSORAL INCISIONLESS FUNDOPLICATION (TIF)  05/11/2021       Family History   Problem Relation Age of Onset    Uterine cancer Mother     Esophageal cancer Father     Colon cancer Maternal Grandmother      I have reviewed and agree  with the history as documented.    E-Cigarette/Vaping    E-Cigarette Use Never User      E-Cigarette/Vaping Substances    Nicotine No     THC No     CBD No     Flavoring No     Other No     Unknown No      Social History     Tobacco Use    Smoking status: Never     Passive exposure: Past    Smokeless tobacco: Never   Vaping Use    Vaping status: Never Used   Substance Use Topics    Alcohol use: Not Currently    Drug use: Not Currently       Review of Systems   Constitutional:  Negative for activity change, chills, fatigue and fever.   HENT:  Positive for trouble swallowing. Negative for sore throat and voice change.    Eyes:  Negative for pain and visual disturbance.   Respiratory:  Negative for choking, shortness of breath and wheezing.    Cardiovascular:  Negative for chest pain and leg swelling.   Gastrointestinal:  Negative for abdominal distention, abdominal pain, diarrhea and vomiting.   Endocrine: Negative for polydipsia and polyuria.   Genitourinary:  Negative for difficulty urinating, dysuria and flank pain.   Musculoskeletal:  Negative for neck stiffness.   Skin:  Positive for itching. Negative for color change and rash.   Neurological:  Negative for dizziness, speech difficulty and headaches.   Hematological:  Does not bruise/bleed easily.   Psychiatric/Behavioral:  Negative for agitation, behavioral problems, hallucinations and suicidal ideas.        Physical Exam  Physical Exam  HENT:      Head: Normocephalic and atraumatic.      Mouth/Throat:      Comments: Airway open and patent uvula midline.  Eyes:      Conjunctiva/sclera: Conjunctivae normal.      Pupils: Pupils are equal, round, and reactive to light.   Cardiovascular:      Rate and Rhythm: Normal rate and regular rhythm.      Heart sounds: No murmur heard.  Pulmonary:      Effort: Pulmonary effort is normal. No respiratory distress.      Breath sounds: Normal breath sounds.   Abdominal:      General: Bowel sounds are normal. There is no  distension.      Palpations: Abdomen is soft.      Tenderness: There is no abdominal tenderness.      Comments: No Signs of Peritonitis    Musculoskeletal:         General: Normal range of motion.      Cervical back: Normal range of motion and neck supple.   Skin:     General: Skin is warm and dry.      Capillary Refill: Capillary refill takes less than 2 seconds.      Coloration: Skin is not pale.      Findings: No rash.   Neurological:      Mental Status: She is alert and oriented to person, place, and time.      GCS: GCS eye subscore is 4. GCS verbal subscore is 5. GCS motor subscore is 6.      Comments: Normal speech, Normal gait, No Focal neurologic deficits    Psychiatric:         Behavior: Behavior normal.         Vital Signs  ED Triage Vitals [04/18/24 1032]   Temperature Pulse Respirations Blood Pressure SpO2   (!) 97.4 °F (36.3 °C) 84 18 147/64 96 %      Temp Source Heart Rate Source Patient Position - Orthostatic VS BP Location FiO2 (%)   Oral Monitor Lying Right arm --      Pain Score       No Pain           Vitals:    04/18/24 1032   BP: 147/64   Pulse: 84   Patient Position - Orthostatic VS: Lying         Visual Acuity      ED Medications  Medications   diphenhydrAMINE (BENADRYL) injection 25 mg (25 mg Intravenous Given 4/18/24 1110)   Famotidine (PF) (PEPCID) injection 20 mg (20 mg Intravenous Given 4/18/24 1120)   methylPREDNISolone sodium succinate (Solu-MEDROL) injection 125 mg (125 mg Intravenous Given 4/18/24 1112)   EPINEPHrine PF (ADRENALIN) 1 mg/mL injection 0.3 mg (0.3 mg Intramuscular Given 4/18/24 1102)       Diagnostic Studies  Results Reviewed       None                   No orders to display              Procedures  Procedures         ED Course           Patient reassessed prior to discharge states symptoms have resolved given epinephrine and steroids as well as Benadryl.  Plan to discharge patient to home a refill prescription for her EpiPen was sent to her pharmacy.  Return precautions  given                                  Medical Decision Making  Patient presents with shortness of breath as well as tongue swelling history of latex allergy    Differential diagnosis: Allergic reaction, anaphylaxis, chemical irritant pneumonitis    Plan to treat patient initially for anaphylaxis including IM epinephrine steroids Benadryl reassess.  Will check ECG to exclude cardiac cause of symptoms    Problems Addressed:  Allergic reaction, initial encounter: acute illness or injury    Amount and/or Complexity of Data Reviewed  ECG/medicine tests: ordered and independent interpretation performed.     Details: ECG independently interpreted by me normal sinus rhythm rate normal axis normals no acute ST-T changes.    Risk  Prescription drug management.             Disposition  Final diagnoses:   Allergic reaction, initial encounter     Time reflects when diagnosis was documented in both MDM as applicable and the Disposition within this note       Time User Action Codes Description Comment    4/18/2024 11:37 AM Moshe Stein Add [T78.40XA] Allergic reaction, initial encounter           ED Disposition       ED Disposition   Discharge    Condition   Stable    Date/Time   Thu Apr 18, 2024 11:37 AM    Comment   Irene Plascencia discharge to home/self care.                   Follow-up Information    None         Discharge Medication List as of 4/18/2024 12:07 PM        CONTINUE these medications which have CHANGED    Details   EPINEPHrine (EPIPEN) 0.3 mg/0.3 mL SOAJ Inject 0.3 mL (0.3 mg total) into a muscle once for 1 dose, Starting Thu 4/18/2024, Normal           CONTINUE these medications which have NOT CHANGED    Details   amLODIPine (NORVASC) 5 mg tablet TAKE 1 TABLET (5 MG TOTAL) BY MOUTH DAILY., Starting Sat 2/3/2024, Normal      benzonatate (TESSALON PERLES) 100 mg capsule Take 1 capsule (100 mg total) by mouth 3 (three) times a day as needed for cough, Starting Tue 8/22/2023, Normal      celecoxib (CeleBREX)  200 mg capsule Take 1 capsule (200 mg total) by mouth daily, Starting Wed 4/10/2024, Normal      [facility] Vitamin B-12 1000 MCG tablet TAKE 1 TABLET BY MOUTH EVERY DAY, Starting Sat 4/6/2024, Normal      ferrous sulfate 325 (65 Fe) mg tablet Take 1 tablet (325 mg total) by mouth daily with breakfast, Starting Thu 3/28/2024, Normal      folic acid (FOLVITE) 400 mcg tablet Take 1 tablet (400 mcg total) by mouth daily, Starting Thu 3/28/2024, Normal      levothyroxine 25 mcg tablet TAKE 1 TABLET BY MOUTH EVERY DAY, Normal      LORazepam (ATIVAN) 2 mg tablet Take 1 tablet (2 mg total) by mouth every 4 (four) hours as needed for anxiety, Starting Thu 3/21/2024, Normal      ondansetron (ZOFRAN) 4 mg tablet Take 1 tablet (4 mg total) by mouth every 6 (six) hours, Starting Tue 2/6/2024, Normal      pantoprazole (PROTONIX) 40 mg tablet TAKE 1 TABLET BY MOUTH TWICE A DAY, Starting Thu 11/16/2023, Normal      PARoxetine (PAXIL) 30 mg tablet Take 2 tablets (60 mg total) by mouth in the morning, Starting Mon 5/22/2023, Normal      predniSONE 20 mg tablet Take by mouth daily, Historical Med      QUEtiapine (SEROquel XR) 400 mg 24 hr tablet Take 1 tablet (400 mg total) by mouth daily at bedtime, Starting Fri 5/26/2023, Normal      sucralfate (CARAFATE) 1 g tablet TAKE 1 TABLET (1 G TOTAL) BY MOUTH 3 (THREE) TIMES A DAY WITH MEALS, Starting Fri 4/5/2024, Normal      topiramate (Topamax) 50 MG tablet 1/2 TAB at bedtime for 1 week, increase to 1/2 TAB in AM and PM for 1 week, increase to 1/2 TAB in AM and 1 TAB in PM for 1 week, and finish at 1 TAB in AM and PM, Normal      triamcinolone (KENALOG) 0.1 % cream APPLY TOPICALLY 2 TIMES A DAY AS NEEDED FOR RASH., Normal      ziprasidone (GEODON) 80 mg capsule TAKE 1 CAPSULE BY MOUTH EVERY DAY, Normal             No discharge procedures on file.    PDMP Review         Value Time User    PDMP Reviewed  Yes 3/21/2024  9:27 AM Raheem Cain MD            ED Provider  Electronically Signed  by             Moshe Stein MD  04/18/24 2121

## 2024-04-19 ENCOUNTER — TELEPHONE (OUTPATIENT)
Age: 64
End: 2024-04-19

## 2024-04-19 LAB
ATRIAL RATE: 75 BPM
P AXIS: 48 DEGREES
PR INTERVAL: 124 MS
QRS AXIS: 16 DEGREES
QRSD INTERVAL: 80 MS
QT INTERVAL: 404 MS
QTC INTERVAL: 451 MS
T WAVE AXIS: 59 DEGREES
VENTRICULAR RATE: 75 BPM

## 2024-04-19 PROCEDURE — 93010 ELECTROCARDIOGRAM REPORT: CPT | Performed by: INTERNAL MEDICINE

## 2024-04-19 NOTE — TELEPHONE ENCOUNTER
Patient sending gratitude for the amazing care she received yesterday. She expresses she was so scared with her anaphylactic reaction to the latex, but the staff bad her feels so cared for.     The medications she received have helped and she expresses she is so grateful she can breathe so much better.Please let the nurses and doctors that cared for her, know how much she is thankful.     :)

## 2024-04-20 ENCOUNTER — HOSPITAL ENCOUNTER (EMERGENCY)
Facility: HOSPITAL | Age: 64
Discharge: HOME | End: 2024-04-20
Attending: EMERGENCY MEDICINE
Payer: COMMERCIAL

## 2024-04-20 VITALS
OXYGEN SATURATION: 95 % | SYSTOLIC BLOOD PRESSURE: 139 MMHG | HEART RATE: 94 BPM | TEMPERATURE: 98.9 F | RESPIRATION RATE: 20 BRPM | DIASTOLIC BLOOD PRESSURE: 83 MMHG

## 2024-04-20 DIAGNOSIS — T78.40XD ALLERGIC REACTION, SUBSEQUENT ENCOUNTER: Primary | ICD-10-CM

## 2024-04-20 PROCEDURE — 99283 EMERGENCY DEPT VISIT LOW MDM: CPT

## 2024-04-20 PROCEDURE — 99284 EMERGENCY DEPT VISIT MOD MDM: CPT | Performed by: EMERGENCY MEDICINE

## 2024-04-20 PROCEDURE — 96375 TX/PRO/DX INJ NEW DRUG ADDON: CPT

## 2024-04-20 PROCEDURE — 96374 THER/PROPH/DIAG INJ IV PUSH: CPT

## 2024-04-20 RX ORDER — FAMOTIDINE 10 MG/ML
20 INJECTION, SOLUTION INTRAVENOUS ONCE
Status: COMPLETED | OUTPATIENT
Start: 2024-04-20 | End: 2024-04-20

## 2024-04-20 RX ORDER — EPINEPHRINE 1 MG/ML
1 INJECTION, SOLUTION, CONCENTRATE INTRAVENOUS ONCE
Status: COMPLETED | OUTPATIENT
Start: 2024-04-20 | End: 2024-04-20

## 2024-04-20 RX ORDER — FAMOTIDINE 20 MG/1
20 TABLET, FILM COATED ORAL DAILY
Qty: 14 TABLET | Refills: 0 | Status: ON HOLD | OUTPATIENT
Start: 2024-04-20

## 2024-04-20 RX ORDER — METHYLPREDNISOLONE SODIUM SUCCINATE 125 MG/2ML
125 INJECTION, POWDER, LYOPHILIZED, FOR SOLUTION INTRAMUSCULAR; INTRAVENOUS ONCE
Status: COMPLETED | OUTPATIENT
Start: 2024-04-20 | End: 2024-04-20

## 2024-04-20 RX ADMIN — FAMOTIDINE 20 MG: 10 INJECTION INTRAVENOUS at 04:46

## 2024-04-20 RX ADMIN — METHYLPREDNISOLONE SODIUM SUCCINATE 125 MG: 125 INJECTION, POWDER, FOR SOLUTION INTRAMUSCULAR; INTRAVENOUS at 04:46

## 2024-04-20 NOTE — DISCHARGE INSTRUCTIONS
Please follow up with your PCP for further treatment. If you use your EpiPen you must call [phone] and return to the ED.

## 2024-04-20 NOTE — ED ATTENDING ATTESTATION
4/20/2024  I, Therese Mix MD, saw and evaluated the patient. I have discussed the patient with the resident/non-physician practitioner and agree with the resident's/non-physician practitioner's findings, Plan of Care, and MDM as documented in the resident's/non-physician practitioner's note, except where noted. All available labs and Radiology studies were reviewed.  I was present for key portions of any procedure(s) performed by the resident/non-physician practitioner and I was immediately available to provide assistance.       At this point I agree with the current assessment done in the Emergency Department.  I have conducted an independent evaluation of this patient a history and physical is as follows:    ED Course         Critical Care Time  Procedures    64 yo female with hx of latex allergy, believes she was exposed to latex from a mattress that she threw away two days ago. Pt was seen in ed at that time and treated for tongue and lip swelling with epi, benadryl, and solumedrol, pepcid with relief of symptoms.  Pt today believes particles of latex were on the mattress residually from the Thursday.  Pt only with tongue and lip swelling today, no rash, no sob, no wheezing, no trouble swallowing.  Vss, afebrile, lungs cta, rrr, abdomen soft nontender, no rash, pt appears to be protruding tongue and lips. Pt took benadryl, given epi by ems. Solumedrol, pecpid, observations.  Low suspicion for angioedema and allergic rxn.

## 2024-04-20 NOTE — ED PROVIDER NOTES
"History  Chief Complaint   Patient presents with    Allergic Reaction     Pt reports a latex allergy and has a mattress topper with latex in it, reaction started approximately 30 minutes ago. Pt reports taking 2 benadryl pills, unsure of dosage. 0.3 of epi was given by EMS. Pt denies CP/SOB, swollen tongue noted at this time.     HPI  Patient is a 63 y.o. female who presents to the ED via EMS for evaluation of allergic reaction that began around 2AM. Patient states she had an allergic reaction to her mattress Topper and was seen here on the 18th.  Since then she has gotten rid of the Topper, but is concerned there is still \"latex particles\" in her home causing her to have an allergic reaction.  Took 50 mg of Benadryl at 3 AM with slight improvement in symptoms.  Called 911 due to \"tongue swelling\" and was given 0.3 mg IM epi via paramedics en route to the ED.  Patient is requesting \"exact same medications and same dosages as last time\" and has brought her discharge papers from the 18th with her.  She denies any rashes, shortness of breath, chest pain, abdominal pain, nausea, vomiting, diarrhea, changes in vision or headache, lightheadedness or dizziness, or any other complaints or concerns at this time.    Prior to Admission Medications   Prescriptions Last Dose Informant Patient Reported? Taking?   [facility] Vitamin B-12 1000 MCG tablet   No No   Sig: TAKE 1 TABLET BY MOUTH EVERY DAY   EPINEPHrine (EPIPEN) 0.3 mg/0.3 mL SOAJ   No No   Sig: Inject 0.3 mL (0.3 mg total) into a muscle once for 1 dose   LORazepam (ATIVAN) 2 mg tablet  Self No No   Sig: Take 1 tablet (2 mg total) by mouth every 4 (four) hours as needed for anxiety   PARoxetine (PAXIL) 30 mg tablet  Self No No   Sig: Take 2 tablets (60 mg total) by mouth in the morning   QUEtiapine (SEROquel XR) 400 mg 24 hr tablet  Self No No   Sig: Take 1 tablet (400 mg total) by mouth daily at bedtime   Patient not taking: Reported on 3/21/2024   amLODIPine (NORVASC) 5 mg " tablet  Self No No   Sig: TAKE 1 TABLET (5 MG TOTAL) BY MOUTH DAILY.   benzonatate (TESSALON PERLES) 100 mg capsule  Self No No   Sig: Take 1 capsule (100 mg total) by mouth 3 (three) times a day as needed for cough   celecoxib (CeleBREX) 200 mg capsule   No No   Sig: Take 1 capsule (200 mg total) by mouth daily   ferrous sulfate 325 (65 Fe) mg tablet   No No   Sig: Take 1 tablet (325 mg total) by mouth daily with breakfast   folic acid (FOLVITE) 400 mcg tablet   No No   Sig: Take 1 tablet (400 mcg total) by mouth daily   levothyroxine 25 mcg tablet  Self No No   Sig: TAKE 1 TABLET BY MOUTH EVERY DAY   ondansetron (ZOFRAN) 4 mg tablet  Self No No   Sig: Take 1 tablet (4 mg total) by mouth every 6 (six) hours   pantoprazole (PROTONIX) 40 mg tablet  Self No No   Sig: TAKE 1 TABLET BY MOUTH TWICE A DAY   predniSONE 20 mg tablet  Self Yes No   Sig: Take by mouth daily   Patient not taking: Reported on 2023   sucralfate (CARAFATE) 1 g tablet   No No   Sig: TAKE 1 TABLET (1 G TOTAL) BY MOUTH 3 (THREE) TIMES A DAY WITH MEALS   topiramate (Topamax) 50 MG tablet  Self No No   Si/2 TAB at bedtime for 1 week, increase to 1/2 TAB in AM and PM for 1 week, increase to 1/2 TAB in AM and 1 TAB in PM for 1 week, and finish at 1 TAB in AM and PM   Patient not taking: Reported on 3/21/2024   triamcinolone (KENALOG) 0.1 % cream  Self No No   Sig: APPLY TOPICALLY 2 TIMES A DAY AS NEEDED FOR RASH.   ziprasidone (GEODON) 80 mg capsule  Self No No   Sig: TAKE 1 CAPSULE BY MOUTH EVERY DAY      Facility-Administered Medications: None       Past Medical History:   Diagnosis Date    Anemia     Anxiety     Arthritis     Back pain at L4-L5 level     Schreiber esophagus     Bulimia     Colon polyp     Disease of thyroid gland     hypothyroid    GERD (gastroesophageal reflux disease)     Hearing loss in left ear     History of transfusion     Hyperlipidemia     Hypertension     Hypothyroidism     Leukopenia     NMS (neuroleptic malignant  syndrome) 01/03/2020    Pneumonia     RA (rheumatoid arthritis) (HCC)     in feet    Sciatica     Seizure (HCC)     due to medication mix up 8/2018 only one historically    Skin spots, red     lower right arm - poss from cat- no s/s infection    Synovial cyst of lumbar spine     Vertigo     Wears dentures     full set    Weight gain        Past Surgical History:   Procedure Laterality Date    BONE MARROW BIOPSY      BREAST SURGERY      enlargement with implants    CLOSED REDUCTION DISTAL FEMUR FRACTURE      COLONOSCOPY      COSMETIC SURGERY      DENTAL SURGERY      HIP ARTHROPLASTY Right 01/02/2020    Procedure: REVISION TOTAL HIP ARTHROPLASTY WITH REPAIR OF PARIPROSTHETIC FRACTURE - POSTERIOR APPROACH;  Surgeon: Dilan Pedersen MD;  Location: [facility];  Service: Orthopedics    ND AMPUTATION METATARSAL W/TOE SINGLE Left 04/07/2023    Procedure: AMPUTATION TOE 4th;  Surgeon: Conner Bartlett DPM;  Location:  MAIN OR;  Service: Podiatry    ND ARTHRP ACETBLR/PROX FEM PROSTC AGRFT/ALGRFT Right 12/11/2019    Procedure: ARTHROPLASTY HIP TOTAL ANTERIOR;  Surgeon: Dilan Pedersen MD;  Location: [facility];  Service: Orthopedics    ND ESOPHAGOGASTRODUODENOSCOPY TRANSORAL DIAGNOSTIC N/A 05/11/2021    Procedure: ESOPHAGOGASTRODUODENOSCOPY (EGD);  Surgeon: David Cedeño MD;  Location: [facility];  Service: General    ND LAPS RPR PARAESPHGL HRNA INCL FUNDPLSTY W/MESH N/A 05/11/2021    Procedure: REPAIR HERNIA PARAESOPHAGEAL  LAPAROSCOPIC;  Surgeon: David Cedeño MD;  Location: [facility];  Service: General    ND UNLISTED PROCEDURE ESOPHAGUS N/A 05/11/2021    Procedure: FUNDOPLICATION TRANSORAL INCISIONLESS;  Surgeon: Brad Cadet MD;  Location: [facility];  Service: Gastroenterology    RHINOPLASTY      SINUS SURGERY      TOE AMPUTATION Left     2023 4th toe    TRANSORAL INCISIONLESS FUNDOPLICATION (TIF)  05/11/2021       Family History   Problem Relation Age of Onset    Uterine cancer Mother     Esophageal cancer Father      Colon cancer Maternal Grandmother      I have reviewed and agree with the history as documented.    E-Cigarette/Vaping    E-Cigarette Use Never User      E-Cigarette/Vaping Substances    Nicotine No     THC No     CBD No     Flavoring No     Other No     Unknown No      Social History     Tobacco Use    Smoking status: Never     Passive exposure: Past    Smokeless tobacco: Never   Vaping Use    Vaping status: Never Used   Substance Use Topics    Alcohol use: Not Currently    Drug use: Not Currently        Review of Systems   Constitutional:  Negative for chills and fever.   HENT:  Negative for congestion and sore throat.    Respiratory:  Negative for shortness of breath.    Cardiovascular:  Negative for chest pain.   Gastrointestinal:  Negative for abdominal pain.   Genitourinary:  Negative for dysuria and hematuria.   Musculoskeletal:  Negative for myalgias.   Skin:  Negative for rash.   Neurological:  Negative for dizziness and headaches.   All other systems reviewed and are negative.      Physical Exam  ED Triage Vitals [04/20/24 0425]   Temperature Pulse Respirations Blood Pressure SpO2   98.9 °F (37.2 °C) 94 20 139/83 95 %      Temp Source Heart Rate Source Patient Position - Orthostatic VS BP Location FiO2 (%)   Oral Monitor Sitting Right arm --      Pain Score       --             Orthostatic Vital Signs  Vitals:    04/20/24 0425   BP: 139/83   Pulse: 94   Patient Position - Orthostatic VS: Sitting       Physical Exam  Vitals and nursing note reviewed.   Constitutional:       General: She is not in acute distress.     Appearance: She is not ill-appearing or diaphoretic.   HENT:      Head: Normocephalic and atraumatic.      Mouth/Throat:      Mouth: Mucous membranes are moist.      Pharynx: Oropharynx is clear.      Comments: Patient noted to protrude tongue and lips when not actively talking. Patient has no evidence of facial or tongue swelling when talking. Airway patent, no oropharyngeal swelling noted,  uvula midline.  Eyes:      General: No scleral icterus.     Conjunctiva/sclera: Conjunctivae normal.   Cardiovascular:      Rate and Rhythm: Regular rhythm. Tachycardia present.      Pulses: Normal pulses.      Heart sounds: Normal heart sounds.   Pulmonary:      Effort: Pulmonary effort is normal. No respiratory distress.      Breath sounds: Normal breath sounds. No stridor. No wheezing, rhonchi or rales.   Abdominal:      Palpations: Abdomen is soft.      Tenderness: There is no abdominal tenderness. There is no guarding or rebound.   Musculoskeletal:         General: No deformity. Normal range of motion.      Cervical back: Normal range of motion and neck supple.   Skin:     General: Skin is warm.      Capillary Refill: Capillary refill takes less than 2 seconds.      Findings: No rash.   Neurological:      Mental Status: She is alert. Mental status is at baseline.   Psychiatric:         Mood and Affect: Mood normal.         Behavior: Behavior normal.         ED Medications  Medications   EPINEPHrine PF (FOR EMS ONLY) (ADRENALIN) 1 mg/mL injection 1 mg (0 mg Does not apply Given to EMS 4/20/24 0429)   Famotidine (PF) (PEPCID) injection 20 mg (20 mg Intravenous Given 4/20/24 0446)   methylPREDNISolone sodium succinate (Solu-MEDROL) injection 125 mg (125 mg Intravenous Given 4/20/24 0446)       Diagnostic Studies  Results Reviewed       None                   No orders to display         Procedures  Procedures      ED Course  ED Course as of 04/20/24 0636   Sat Apr 20, 2024   0611 Patient reevaluated, reports improvement in symptoms. Denies any new or worsening complaints or concerns at this time. HR improved. Respirations are unlabored and even without any evidence of respiratory distress. Discussed evaluation with findings and plan with patient. Advised on need for outpatient follow up, given information. Given return precautions verbally and in discharge instructions, confirmed with teach back method. Patient  requesting prescription for Pepcid and Solumedrol, will give Rx for Pepcid, no indication for prolonged steroid course as risks outweigh benefits at this time. All questions answered prior to discharge. Patient expressed verbal understanding and is agreeable with plan for discharge with outpatient follow up.                             SBIRT 22yo+      Flowsheet Row Most Recent Value   Initial Alcohol Screen: US AUDIT-C     1. How often do you have a drink containing alcohol? 0 Filed at: 04/20/2024 0436   2. How many drinks containing alcohol do you have on a typical day you are drinking?  0 Filed at: 04/20/2024 0436   3b. FEMALE Any Age, or MALE 65+: How often do you have 4 or more drinks on one occassion? 0 Filed at: 04/20/2024 0436   Audit-C Score 0 Filed at: 04/20/2024 0436   MIA: How many times in the past year have you...    Used an illegal drug or used a prescription medication for non-medical reasons? Never Filed at: 04/20/2024 0436                  Medical Decision Making  ASSESSMENT: Patient is a 63 y.o. female who presents with allergic reaction to known allergen, received Epi IM en route, already taken benadryl.   DDX includes but not limited to: allergic reaction, doubt acute anaphylaxis, no evidence of airway compromise, illness anxiety disorder.   PLAN: Will give Pepcid and solumedrol and observe in the ED. Placed on Monitor, tachycardia noted and likely 2/2 epi given by EMS. See ED course for additional details.    Problems Addressed:  Allergic reaction: acute illness or injury    Amount and/or Complexity of Data Reviewed  External Data Reviewed:      Details: Patient previously seen in the ED for similar complaint multiple times. Evaluated by PCP on 4/1 for pruritus and concern about mattress pad at that time.    Risk  OTC drugs.  Prescription drug management.          Disposition  Final diagnoses:   Allergic reaction     Time reflects when diagnosis was documented in both MDM as applicable and the  Disposition within this note       Time User Action Codes Description Comment    4/20/2024  5:01 AM Jillian Avalos Add [T78.40XD] Allergic reaction, subsequent encounter     4/20/2024  6:15 AM Jillian Avalos Modify [T78.40XD] Allergic reaction           ED Disposition       ED Disposition   Discharge    Condition   Stable    Date/Time   Sat Apr 20, 2024 0501    Comment   Irene Plascencia discharge to home/self care.                   Follow-up Information       Follow up With Specialties Details Why Contact Info    Raheem Cain MD Internal Medicine Call   [address] [place] [id number] [phone]              Discharge Medication List as of 4/20/2024  6:11 AM        CONTINUE these medications which have NOT CHANGED    Details   amLODIPine (NORVASC) 5 mg tablet TAKE 1 TABLET (5 MG TOTAL) BY MOUTH DAILY., Starting Sat 2/3/2024, Normal      benzonatate (TESSALON PERLES) 100 mg capsule Take 1 capsule (100 mg total) by mouth 3 (three) times a day as needed for cough, Starting Tue 8/22/2023, Normal      celecoxib (CeleBREX) 200 mg capsule Take 1 capsule (200 mg total) by mouth daily, Starting Wed 4/10/2024, Normal      [facility] Vitamin B-12 1000 MCG tablet TAKE 1 TABLET BY MOUTH EVERY DAY, Starting Sat 4/6/2024, Normal      EPINEPHrine (EPIPEN) 0.3 mg/0.3 mL SOAJ Inject 0.3 mL (0.3 mg total) into a muscle once for 1 dose, Starting Thu 4/18/2024, Normal      ferrous sulfate 325 (65 Fe) mg tablet Take 1 tablet (325 mg total) by mouth daily with breakfast, Starting Thu 3/28/2024, Normal      folic acid (FOLVITE) 400 mcg tablet Take 1 tablet (400 mcg total) by mouth daily, Starting Thu 3/28/2024, Normal      levothyroxine 25 mcg tablet TAKE 1 TABLET BY MOUTH EVERY DAY, Normal      LORazepam (ATIVAN) 2 mg tablet Take 1 tablet (2 mg total) by mouth every 4 (four) hours as needed for anxiety, Starting Thu 3/21/2024, Normal      ondansetron (ZOFRAN) 4 mg tablet Take 1 tablet (4 mg total) by mouth every 6 (six)  hours, Starting Tue 2/6/2024, Normal      pantoprazole (PROTONIX) 40 mg tablet TAKE 1 TABLET BY MOUTH TWICE A DAY, Starting Thu 11/16/2023, Normal      PARoxetine (PAXIL) 30 mg tablet Take 2 tablets (60 mg total) by mouth in the morning, Starting Mon 5/22/2023, Normal      predniSONE 20 mg tablet Take by mouth daily, Historical Med      QUEtiapine (SEROquel XR) 400 mg 24 hr tablet Take 1 tablet (400 mg total) by mouth daily at bedtime, Starting Fri 5/26/2023, Normal      sucralfate (CARAFATE) 1 g tablet TAKE 1 TABLET (1 G TOTAL) BY MOUTH 3 (THREE) TIMES A DAY WITH MEALS, Starting Fri 4/5/2024, Normal      topiramate (Topamax) 50 MG tablet 1/2 TAB at bedtime for 1 week, increase to 1/2 TAB in AM and PM for 1 week, increase to 1/2 TAB in AM and 1 TAB in PM for 1 week, and finish at 1 TAB in AM and PM, Normal      triamcinolone (KENALOG) 0.1 % cream APPLY TOPICALLY 2 TIMES A DAY AS NEEDED FOR RASH., Normal      ziprasidone (GEODON) 80 mg capsule TAKE 1 CAPSULE BY MOUTH EVERY DAY, Normal           No discharge procedures on file.    PDMP Review         Value Time User    PDMP Reviewed  Yes 3/21/2024  9:27 AM Raheem Cain MD             ED Provider  Attending physically available and evaluated Irene Plascencia. I managed the patient along with the ED Attending.    Electronically Signed by           Jillian De León DO  04/20/24 0636

## 2024-04-22 DIAGNOSIS — F41.9 ANXIETY: ICD-10-CM

## 2024-04-22 DIAGNOSIS — F99 PSYCHIATRIC DISORDER: ICD-10-CM

## 2024-04-22 DIAGNOSIS — F41.1 GAD (GENERALIZED ANXIETY DISORDER): Chronic | ICD-10-CM

## 2024-04-22 DIAGNOSIS — F25.1 SCHIZOAFFECTIVE DISORDER, DEPRESSIVE TYPE (HCC): ICD-10-CM

## 2024-04-23 RX ORDER — LORAZEPAM 2 MG/1
2 TABLET ORAL EVERY 4 HOURS PRN
Qty: 180 TABLET | Refills: 1 | Status: ON HOLD | OUTPATIENT
Start: 2024-04-23

## 2024-04-23 RX ORDER — PAROXETINE 30 MG/1
60 TABLET, FILM COATED ORAL DAILY
Qty: 180 TABLET | Refills: 1 | Status: SHIPPED | OUTPATIENT
Start: 2024-04-23

## 2024-04-26 ENCOUNTER — OFFICE VISIT (OUTPATIENT)
Dept: INTERNAL MEDICINE CLINIC | Facility: CLINIC | Age: 64
End: 2024-04-26
Payer: COMMERCIAL

## 2024-04-26 VITALS
OXYGEN SATURATION: 99 % | SYSTOLIC BLOOD PRESSURE: 142 MMHG | HEART RATE: 102 BPM | WEIGHT: 179.2 LBS | DIASTOLIC BLOOD PRESSURE: 80 MMHG | BODY MASS INDEX: 32.78 KG/M2

## 2024-04-26 DIAGNOSIS — T78.40XS ALLERGIC REACTION, SEQUELA: Primary | ICD-10-CM

## 2024-04-26 DIAGNOSIS — Z00.00 MEDICARE ANNUAL WELLNESS VISIT, SUBSEQUENT: ICD-10-CM

## 2024-04-26 DIAGNOSIS — Z23 ENCOUNTER FOR IMMUNIZATION: ICD-10-CM

## 2024-04-26 PROBLEM — R05.9 COUGH: Status: RESOLVED | Noted: 2022-09-21 | Resolved: 2024-04-26

## 2024-04-26 PROCEDURE — G0439 PPPS, SUBSEQ VISIT: HCPCS | Performed by: INTERNAL MEDICINE

## 2024-04-26 PROCEDURE — G0009 ADMIN PNEUMOCOCCAL VACCINE: HCPCS

## 2024-04-26 PROCEDURE — 3077F SYST BP >= 140 MM HG: CPT | Performed by: INTERNAL MEDICINE

## 2024-04-26 PROCEDURE — 1090F PRES/ABSN URINE INCON ASSESS: CPT | Performed by: INTERNAL MEDICINE

## 2024-04-26 PROCEDURE — 90677 PCV20 VACCINE IM: CPT

## 2024-04-26 PROCEDURE — 3079F DIAST BP 80-89 MM HG: CPT | Performed by: INTERNAL MEDICINE

## 2024-04-26 RX ORDER — METHYLPREDNISOLONE 4 MG/1
TABLET ORAL
Qty: 21 EACH | Refills: 0 | Status: ON HOLD | OUTPATIENT
Start: 2024-04-26

## 2024-04-26 NOTE — PATIENT INSTRUCTIONS
Problem List Items Addressed This Visit          Other    Medicare annual wellness visit, subsequent     Discussed preventative health, cancer screening, immunizations, and safety issues.  Last colonoscopy September 2020 with recommendations recheck again in 5 years.  I recommend yearly flu shot.  I recommend Prevnar 20 today.    I recommend getting the Shingrix shot to help prevent Shingles.  You can get it a pharmacy, and they can administer it there.  It is a two shot series with the second shot needed between 2-6 months after the first shot.  I would not recommend getting the shot before an important or fun event in case you were to have a reaction to the shot like a sore arm or flu-like symptoms.  I make the same recommendation about any shot, as people can have a reaction to any shot.  Patient due for mammogram and this was already ordered          Other Visit Diagnoses       Allergic reaction, sequela    -  Primary    Relevant Medications    methylPREDNISolone 4 MG tablet therapy pack    Encounter for immunization        Relevant Orders    Pneumococcal Conjugate Vaccine 20-valent (Pcv20)            Medicare Preventive Visit Patient Instructions  Thank you for completing your Welcome to Medicare Visit or Medicare Annual Wellness Visit today. Your next wellness visit will be due in one year (4/27/2025).  The screening/preventive services that you may require over the next 5-10 years are detailed below. Some tests may not apply to you based off risk factors and/or age. Screening tests ordered at today's visit but not completed yet may show as past due. Also, please note that scanned in results may not display below.  Preventive Screenings:  Service Recommendations Previous Testing/Comments   Colorectal Cancer Screening  * Colonoscopy    * Fecal Occult Blood Test (FOBT)/Fecal Immunochemical Test (FIT)  * Fecal DNA/Cologuard Test  * Flexible Sigmoidoscopy Age: 45-75 years old   Colonoscopy: every 10 years (may be  performed more frequently if at higher risk)  OR  FOBT/FIT: every 1 year  OR  Cologuard: every 3 years  OR  Sigmoidoscopy: every 5 years  Screening may be recommended earlier than age 45 if at higher risk for colorectal cancer. Also, an individualized decision between you and your healthcare provider will decide whether screening between the ages of 76-85 would be appropriate. Colonoscopy: 09/24/2020  FOBT/FIT: 04/06/2022  Cologuard: Not on file  Sigmoidoscopy: Not on file          Breast Cancer Screening Age: 40+ years old  Frequency: every 1-2 years  Not required if history of left and right mastectomy Mammogram: Not on file        Cervical Cancer Screening Between the ages of 21-29, pap smear recommended once every 3 years.   Between the ages of 30-65, can perform pap smear with HPV co-testing every 5 years.   Recommendations may differ for women with a history of total hysterectomy, cervical cancer, or abnormal pap smears in past. Pap Smear: Not on file        Hepatitis C Screening Once for adults born between 1945 and 1965  More frequently in patients at high risk for Hepatitis C Hep C Antibody: 07/15/2019        Diabetes Screening 1-2 times per year if you're at risk for diabetes or have pre-diabetes Fasting glucose: 92 mg/dL (1/12/2024)  A1C: 5.0 % (5/13/2021)      Cholesterol Screening Once every 5 years if you don't have a lipid disorder. May order more often based on risk factors. Lipid panel: 05/13/2021          Other Preventive Screenings Covered by Medicare:  Abdominal Aortic Aneurysm (AAA) Screening: covered once if your at risk. You're considered to be at risk if you have a family history of AAA.  Lung Cancer Screening: covers low dose CT scan once per year if you meet all of the following conditions: (1) Age 55-77; (2) No signs or symptoms of lung cancer; (3) Current smoker or have quit smoking within the last 15 years; (4) You have a tobacco smoking history of at least 20 pack years (packs per day  multiplied by number of years you smoked); (5) You get a written order from a healthcare provider.  Glaucoma Screening: covered annually if you're considered high risk: (1) You have diabetes OR (2) Family history of glaucoma OR (3)  aged 50 and older OR (4)  American aged 65 and older  Osteoporosis Screening: covered every 2 years if you meet one of the following conditions: (1) You're estrogen deficient and at risk for osteoporosis based off medical history and other findings; (2) Have a vertebral abnormality; (3) On glucocorticoid therapy for more than 3 months; (4) Have primary hyperparathyroidism; (5) On osteoporosis medications and need to assess response to drug therapy.   Last bone density test (DXA Scan): 06/08/2018.  HIV Screening: covered annually if you're between the age of 15-65. Also covered annually if you are younger than 15 and older than 65 with risk factors for HIV infection. For pregnant patients, it is covered up to 3 times per pregnancy.    Immunizations:  Immunization Recommendations   Influenza Vaccine Annual influenza vaccination during flu season is recommended for all persons aged >= 6 months who do not have contraindications   Pneumococcal Vaccine   * Pneumococcal conjugate vaccine = PCV13 (Prevnar 13), PCV15 (Vaxneuvance), PCV20 (Prevnar 20)  * Pneumococcal polysaccharide vaccine = PPSV23 (Pneumovax) Adults 19-63 yo with certain risk factors or if 65+ yo  If never received any pneumonia vaccine: recommend Prevnar 20 (PCV20)  Give PCV20 if previously received 1 dose of PCV13 or PPSV23   Hepatitis B Vaccine 3 dose series if at intermediate or high risk (ex: diabetes, end stage renal disease, liver disease)   Respiratory syncytial virus (RSV) Vaccine - COVERED BY MEDICARE PART D  * RSVPreF3 (Arexvy) CDC recommends that adults 60 years of age and older may receive a single dose of RSV vaccine using shared clinical decision-making (SCDM)   Tetanus (Td) Vaccine - COST  NOT COVERED BY MEDICARE PART B Following completion of primary series, a booster dose should be given every 10 years to maintain immunity against tetanus. Td may also be given as tetanus wound prophylaxis.   Tdap Vaccine - COST NOT COVERED BY MEDICARE PART B Recommended at least once for all adults. For pregnant patients, recommended with each pregnancy.   Shingles Vaccine (Shingrix) - COST NOT COVERED BY MEDICARE PART B  2 shot series recommended in those 19 years and older who have or will have weakened immune systems or those 50 years and older     Health Maintenance Due:      Topic Date Due    HIV Screening  Never done    Cervical Cancer Screening  Never done    Breast Cancer Screening: Mammogram  Never done    Colorectal Cancer Screening  09/24/2025    Hepatitis C Screening  Completed     Immunizations Due:      Topic Date Due    COVID-19 Vaccine (7 - 2023-24 season) 11/29/2023     Advance Directives   What are advance directives?  Advance directives are legal documents that state your wishes and plans for medical care. These plans are made ahead of time in case you lose your ability to make decisions for yourself. Advance directives can apply to any medical decision, such as the treatments you want, and if you want to donate organs.   What are the types of advance directives?  There are many types of advance directives, and each state has rules about how to use them. You may choose a combination of any of the following:  Living will:  This is a written record of the treatment you want. You can also choose which treatments you do not want, which to limit, and which to stop at a certain time. This includes surgery, medicine, IV fluid, and tube feedings.   Durable power of  for healthcare (DPAHC):  This is a written record that states who you want to make healthcare choices for you when you are unable to make them for yourself. This person, called a proxy, is usually a family member or a friend. You may  choose more than 1 proxy.  Do not resuscitate (DNR) order:  A DNR order is used in case your heart stops beating or you stop breathing. It is a request not to have certain forms of treatment, such as CPR. A DNR order may be included in other types of advance directives.  Medical directive:  This covers the care that you want if you are in a coma, near death, or unable to make decisions for yourself. You can list the treatments you want for each condition. Treatment may include pain medicine, surgery, blood transfusions, dialysis, IV or tube feedings, and a ventilator (breathing machine).  Values history:  This document has questions about your views, beliefs, and how you feel and think about life. This information can help others choose the care that you would choose.  Why are advance directives important?  An advance directive helps you control your care. Although spoken wishes may be used, it is better to have your wishes written down. Spoken wishes can be misunderstood, or not followed. Treatments may be given even if you do not want them. An advance directive may make it easier for your family to make difficult choices about your care.   Urinary Incontinence   Urinary incontinence (UI)  is when you lose control of your bladder. UI develops because your bladder cannot store or empty urine properly. The 3 most common types of UI are stress incontinence, urge incontinence, or both.  Medicines:   May be given to help strengthen your bladder control. Report any side effects of medication to your healthcare provider.  Do pelvic muscle exercises often:  Your pelvic muscles help you stop urinating. Squeeze these muscles tight for 5 seconds, then relax for 5 seconds. Gradually work up to squeezing for 10 seconds. Do 3 sets of 15 repetitions a day, or as directed. This will help strengthen your pelvic muscles and improve bladder control.  Train your bladder:  Go to the bathroom at set times, such as every 2 hours, even if  you do not feel the urge to go. You can also try to hold your urine when you feel the urge to go. For example, hold your urine for 5 minutes when you feel the urge to go. As that becomes easier, hold your urine for 10 minutes.   Self-care:   Keep a UI record.  Write down how often you leak urine and how much you leak. Make a note of what you were doing when you leaked urine.  Drink liquids as directed. You may need to limit the amount of liquid you drink to help control your urine leakage. Do not drink any liquid right before you go to bed. Limit or do not have drinks that contain caffeine or alcohol.   Prevent constipation.  Eat a variety of high-fiber foods. Good examples are high-fiber cereals, beans, vegetables, and whole-grain breads. Walking is the best way to trigger your intestines to have a bowel movement.  Exercise regularly and maintain a healthy weight.  Weight loss and exercise will decrease pressure on your bladder and help you control your leakage.   Use a catheter as directed  to help empty your bladder. A catheter is a tiny, plastic tube that is put into your bladder to drain your urine.   Go to behavior therapy as directed.  Behavior therapy may be used to help you learn to control your urge to urinate.    Weight Management   Why it is important to manage your weight:  Being overweight increases your risk of health conditions such as heart disease, high blood pressure, type 2 diabetes, and certain types of cancer. It can also increase your risk for osteoarthritis, sleep apnea, and other respiratory problems. Aim for a slow, steady weight loss. Even a small amount of weight loss can lower your risk of health problems.  How to lose weight safely:  A safe and healthy way to lose weight is to eat fewer calories and get regular exercise. You can lose up about 1 pound a week by decreasing the number of calories you eat by 500 calories each day.   Healthy meal plan for weight management:  A healthy meal  plan includes a variety of foods, contains fewer calories, and helps you stay healthy. A healthy meal plan includes the following:  Eat whole-grain foods more often.  A healthy meal plan should contain fiber. Fiber is the part of grains, fruits, and vegetables that is not broken down by your body. Whole-grain foods are healthy and provide extra fiber in your diet. Some examples of whole-grain foods are whole-wheat breads and pastas, oatmeal, brown rice, and bulgur.  Eat a variety of vegetables every day.  Include dark, leafy greens such as spinach, kale, brigette greens, and mustard greens. Eat yellow and orange vegetables such as carrots, sweet potatoes, and winter squash.   Eat a variety of fruits every day.  Choose fresh or canned fruit (canned in its own juice or light syrup) instead of juice. Fruit juice has very little or no fiber.  Eat low-fat dairy foods.  Drink fat-free (skim) milk or 1% milk. Eat fat-free yogurt and low-fat cottage cheese. Try low-fat cheeses such as mozzarella and other reduced-fat cheeses.  Choose meat and other protein foods that are low in fat.  Choose beans or other legumes such as split peas or lentils. Choose fish, skinless poultry (chicken or turkey), or lean cuts of red meat (beef or pork). Before you cook meat or poultry, cut off any visible fat.   Use less fat and oil.  Try baking foods instead of frying them. Add less fat, such as margarine, sour cream, regular salad dressing and mayonnaise to foods. Eat fewer high-fat foods. Some examples of high-fat foods include french fries, doughnuts, ice cream, and cakes.  Eat fewer sweets.  Limit foods and drinks that are high in sugar. This includes candy, cookies, regular soda, and sweetened drinks.  Exercise:  Exercise at least 30 minutes per day on most days of the week. Some examples of exercise include walking, biking, dancing, and swimming. You can also fit in more physical activity by taking the stairs instead of the elevator or  parking farther away from stores. Ask your healthcare provider about the best exercise plan for you.      © Copyright [employer] 2018 Information is for End User's use only and may not be sold, redistributed or otherwise used for commercial purposes. All illustrations and images included in CareNotes® are the copyrighted property of [employer] or [employer]

## 2024-04-26 NOTE — ASSESSMENT & PLAN NOTE
Discussed preventative health, cancer screening, immunizations, and safety issues.  Last colonoscopy September 2020 with recommendations recheck again in 5 years.  I recommend yearly flu shot.  I recommend Prevnar 20 today.    I recommend getting the Shingrix shot to help prevent Shingles.  You can get it a pharmacy, and they can administer it there.  It is a two shot series with the second shot needed between 2-6 months after the first shot.  I would not recommend getting the shot before an important or fun event in case you were to have a reaction to the shot like a sore arm or flu-like symptoms.  I make the same recommendation about any shot, as people can have a reaction to any shot.  Patient due for mammogram and this was already ordered

## 2024-04-26 NOTE — PROGRESS NOTES
Assessment and Plan:     Problem List Items Addressed This Visit        Other    Medicare annual wellness visit, subsequent     Discussed preventative health, cancer screening, immunizations, and safety issues.  Last colonoscopy September 2020 with recommendations recheck again in 5 years.  I recommend yearly flu shot.  I recommend Prevnar 20 today.    I recommend getting the Shingrix shot to help prevent Shingles.  You can get it a pharmacy, and they can administer it there.  It is a two shot series with the second shot needed between 2-6 months after the first shot.  I would not recommend getting the shot before an important or fun event in case you were to have a reaction to the shot like a sore arm or flu-like symptoms.  I make the same recommendation about any shot, as people can have a reaction to any shot.  Patient due for mammogram and this was already ordered        Other Visit Diagnoses     Allergic reaction, sequela    -  Primary    Relevant Medications    methylPREDNISolone 4 MG tablet therapy pack    Encounter for immunization        Relevant Orders    Pneumococcal Conjugate Vaccine 20-valent (Pcv20)           Preventive health issues were discussed with patient, and age appropriate screening tests were ordered as noted in patient's After Visit Summary.  Personalized health advice and appropriate referrals for health education or preventive services given if needed, as noted in patient's After Visit Summary.     History of Present Illness:     Patient presents for a Medicare Wellness Visit    Patient here for follow up from the emergency room for allergic reaction likely to latex    She is also due for annual wellness visit       Patient Care Team:  Raheem Cain MD as PCP - General (Internal Medicine)  MD Nata Frank CRNP as Nurse Practitioner (Pain Medicine)  Tigre Zimmerman DO (Anesthesiology)     Review of Systems:     Review of Systems   Respiratory:  Negative for shortness of  breath and wheezing.         Problem List:     Patient Active Problem List   Diagnosis   • Lumbar radiculopathy   • DDD (degenerative disc disease), lumbar   • Spondylolisthesis at L4-L5 level   • Localized osteoporosis with current pathological fracture with routine healing   • Spinal stenosis of lumbar region with neurogenic claudication   • Lumbar spondylosis   • T12 compression fracture ([facility])   • Synovial cyst of lumbar facet joint   • Anxiety   • Bulimia nervosa in remission   • Constipation   • Acquired hypothyroidism   • Other fatigue   • Serotonin syndrome   • Schizoaffective disorder ([facility])   • Psychiatric disorder   • Dermal hypersensitivity reaction   • Primary hypertension   • Right hip pain   • Chronic bilateral low back pain without sciatica   • Severe iron deficiency anemia    • Preop exam for internal medicine   • NMS (neuroleptic malignant syndrome)   • Hypokalemia   • Fall   • Esophagitis   • Hyponatremia   • Problem related to living arrangement   • Medicare annual wellness visit, subsequent   • Iron deficiency anemia   • Multiple excoriations   • Rash   • Cellulitis of left upper extremity   • Erosive esophagitis   • Melena   • Hiatal hernia   • Polyp of colon   • Word finding difficulty   • SIRS (systemic inflammatory response syndrome) ([facility])   • Hyperlipidemia   • Nausea and vomiting   • S/P tooth extraction   • Upper GI bleed   • Pruritus   • Self neglect   • Anemia   • Schreiber's esophagus   • Cough   • Acute encephalopathy   • Thrombocytosis   • Abdominal pain   • Stress incontinence   • Ulcer of toe, chronic, left, with unspecified severity ([facility])   • Nutritional anemia   • Headache   • Acute respiratory failure with hypoxia ([facility])   • Sepsis without acute organ dysfunction ([facility])   • Post concussion syndrome   • Neutropenia ([facility])   • Vitamin B12 deficiency (dietary) anemia   • Folate deficiency   • Acquired absence of other toe(s), unspecified side ([facility])   • Osteomyelitis, unspecified site,  unspecified type ([facility])   • Elevated vitamin B12 level   • Iron deficiency      Past Medical and Surgical History:     Past Medical History:   Diagnosis Date   • Anemia    • Anxiety    • Arthritis    • Back pain at L4-L5 level    • Schreiber esophagus    • Bulimia    • Colon polyp    • Disease of thyroid gland     hypothyroid   • GERD (gastroesophageal reflux disease)    • Hearing loss in left ear    • History of transfusion    • Hyperlipidemia    • Hypertension    • Hypothyroidism    • Leukopenia    • NMS (neuroleptic malignant syndrome) 01/03/2020   • Pneumonia    • RA (rheumatoid arthritis) (HCC)     in feet   • Sciatica    • Seizure ([facility]CC)     due to medication mix up 8/2018 only one historically   • Skin spots, red     lower right arm - poss from cat- no s/s infection   • Synovial cyst of lumbar spine    • Vertigo    • Wears dentures     full set   • Weight gain      Past Surgical History:   Procedure Laterality Date   • BONE MARROW BIOPSY     • BREAST SURGERY      enlargement with implants   • CLOSED REDUCTION DISTAL FEMUR FRACTURE     • COLONOSCOPY     • COSMETIC SURGERY     • DENTAL SURGERY     • HIP ARTHROPLASTY Right 01/02/2020    Procedure: REVISION TOTAL HIP ARTHROPLASTY WITH REPAIR OF PARIPROSTHETIC FRACTURE - POSTERIOR APPROACH;  Surgeon: Dilan Pedersen MD;  Location: [facility];  Service: Orthopedics   • NH AMPUTATION METATARSAL W/TOE SINGLE Left 04/07/2023    Procedure: AMPUTATION TOE 4th;  Surgeon: Conner Bartlett DPM;  Location:  MAIN OR;  Service: Podiatry   • NH ARTHRP ACETBLR/PROX FEM PROSTC AGRFT/ALGRFT Right 12/11/2019    Procedure: ARTHROPLASTY HIP TOTAL ANTERIOR;  Surgeon: Dilan Pedersen MD;  Location:  MAIN OR;  Service: Orthopedics   • NH ESOPHAGOGASTRODUODENOSCOPY TRANSORAL DIAGNOSTIC N/A 05/11/2021    Procedure: ESOPHAGOGASTRODUODENOSCOPY (EGD);  Surgeon: David Cedeño MD;  Location:  MAIN OR;  Service: General   • NH LAPS RPR PARAESPHGL HRNA INCL FUNDPLSTY W/MESH N/A 05/11/2021     Procedure: REPAIR HERNIA PARAESOPHAGEAL  LAPAROSCOPIC;  Surgeon: David Cedeño MD;  [facility];  Service: General   • HI UNLISTED PROCEDURE ESOPHAGUS N/A 05/11/2021    Procedure: FUNDOPLICATION TRANSORAL INCISIONLESS;  Surgeon: Brad Cadet MD;  Location: [facility];  Service: Gastroenterology   • RHINOPLASTY     • SINUS SURGERY     • TOE AMPUTATION Left     2023 4th toe   • TRANSORAL INCISIONLESS FUNDOPLICATION (TIF)  05/11/2021      Family History:     Family History   Problem Relation Age of Onset   • Uterine cancer Mother    • Esophageal cancer Father    • Colon cancer Maternal Grandmother       Social History:     Social History     Socioeconomic History   • Marital status: Single     Spouse name: None   • Number of children: None   • Years of education: None   • Highest education level: None   Occupational History   • None   Tobacco Use   • Smoking status: Never     Passive exposure: Past   • Smokeless tobacco: Never   Vaping Use   • Vaping status: Never Used   Substance and Sexual Activity   • Alcohol use: Not Currently   • Drug use: Not Currently   • Sexual activity: Not Currently   Other Topics Concern   • None   Social History Narrative    Family disruption death of family member parent, per [employer]scripts     Social Determinants of Health     Financial Resource Strain: Medium Risk (1/4/2023)    Overall Financial Resource Strain (CARDIA)    • Difficulty of Paying Living Expenses: Somewhat hard   Food Insecurity: No Food Insecurity (4/26/2024)    Hunger Vital Sign    • Worried About Running Out of Food in the Last Year: Never true    • Ran Out of Food in the Last Year: Never true   Recent Concern: Food Insecurity - Food Insecurity Present (3/13/2024)    Hunger Vital Sign    • Worried About Running Out of Food in the Last Year: Sometimes true    • Ran Out of Food in the Last Year: Sometimes true   Transportation Needs: No Transportation Needs (4/26/2024)    PRAPARE - Transportation    • Lack of  Transportation (Medical): No    • Lack of Transportation (Non-Medical): No   Physical Activity: Not on file   Stress: Stress Concern Present (5/18/2021)    [employer] - Occupational Stress Questionnaire    • Feeling of Stress : To some extent   Social Connections: Not on file   Intimate Partner Violence: Not on file   Housing Stability: High Risk (4/26/2024)    Housing Stability Vital Sign    • Unable to Pay for Housing in the Last Year: Yes    • Number of Places Lived in the Last Year: 1    • Unstable Housing in the Last Year: No      Medications and Allergies:     Current Outpatient Medications   Medication Sig Dispense Refill   • amLODIPine (NORVASC) 5 mg tablet TAKE 1 TABLET (5 MG TOTAL) BY MOUTH DAILY. 90 tablet 3   • benzonatate (TESSALON PERLES) 100 mg capsule Take 1 capsule (100 mg total) by mouth 3 (three) times a day as needed for cough 30 capsule 5   • celecoxib (CeleBREX) 200 mg capsule Take 1 capsule (200 mg total) by mouth daily 30 capsule 5   • [facility] Vitamin B-12 1000 MCG tablet TAKE 1 TABLET BY MOUTH EVERY DAY 90 tablet 1   • famotidine (PEPCID) 20 mg tablet Take 1 tablet (20 mg total) by mouth daily 14 tablet 0   • ferrous sulfate 325 (65 Fe) mg tablet Take 1 tablet (325 mg total) by mouth daily with breakfast 60 tablet 3   • folic acid (FOLVITE) 400 mcg tablet Take 1 tablet (400 mcg total) by mouth daily 30 tablet 3   • levothyroxine 25 mcg tablet TAKE 1 TABLET BY MOUTH EVERY DAY 90 tablet 3   • LORazepam (ATIVAN) 2 mg tablet Take 1 tablet (2 mg total) by mouth every 4 (four) hours as needed for anxiety 180 tablet 1   • methylPREDNISolone 4 MG tablet therapy pack Use as directed on package 21 each 0   • ondansetron (ZOFRAN) 4 mg tablet Take 1 tablet (4 mg total) by mouth every 6 (six) hours 12 tablet 0   • pantoprazole (PROTONIX) 40 mg tablet TAKE 1 TABLET BY MOUTH TWICE A  tablet 1   • PARoxetine (PAXIL) 30 mg tablet TAKE 2 TABLETS (60 MG TOTAL) BY MOUTH IN THE  MORNING 180 tablet 1   • sucralfate (CARAFATE) 1 g tablet TAKE 1 TABLET (1 G TOTAL) BY MOUTH 3 (THREE) TIMES A DAY WITH MEALS 270 tablet 1   • triamcinolone (KENALOG) 0.1 % cream APPLY TOPICALLY 2 TIMES A DAY AS NEEDED FOR RASH. 160 g 5   • ziprasidone (GEODON) 80 mg capsule TAKE 1 CAPSULE BY MOUTH EVERY DAY 90 capsule 3   • EPINEPHrine (EPIPEN) 0.3 mg/0.3 mL SOAJ Inject 0.3 mL (0.3 mg total) into a muscle once for 1 dose 0.6 mL 0   • predniSONE 20 mg tablet Take by mouth daily (Patient not taking: Reported on 12/4/2023)     • QUEtiapine (SEROquel XR) 400 mg 24 hr tablet Take 1 tablet (400 mg total) by mouth daily at bedtime (Patient not taking: Reported on 3/21/2024) 90 tablet 3   • topiramate (Topamax) 50 MG tablet 1/2 TAB at bedtime for 1 week, increase to 1/2 TAB in AM and PM for 1 week, increase to 1/2 TAB in AM and 1 TAB in PM for 1 week, and finish at 1 TAB in AM and PM (Patient not taking: Reported on 3/21/2024) 180 tablet 3     No current facility-administered medications for this visit.     Allergies   Allergen Reactions   • Risperdal [Risperidone] Shortness Of Breath     Rapid heart beat, SOB   • Zyprexa [Olanzapine] Shortness Of Breath     Rapid heartbeat   • Latex Rash and Tongue Swelling     Band aids, adhesives wears normal underwear, can eat bananas  USE PAPER TAPE      Immunizations:     Immunization History   Administered Date(s) Administered   • COVID-19 PFIZER VACCINE 0.3 ML IM 03/20/2021, 04/10/2021, 10/09/2021   • COVID-19 Pfizer Vac BIVALENT Jt-sucrose 12 Yr+ IM 09/10/2022   • COVID-19 Pfizer mRNA vacc PF jt-sucrose 12 yr and older (Comirnaty) 10/04/2023   • COVID-19 Pfizer vac (Jt-sucrose, gray cap) 12 yr+ IM 05/30/2022   • H1N1, All Formulations 11/17/2009   • INFLUENZA 11/17/2009, 01/04/2013, 11/11/2014, 10/20/2015, 10/31/2016, 09/16/2018, 11/07/2022   • Influenza Injectable, MDCK, Preservative Free, Quadrivalent, 0.5 mL 09/26/2019   • Influenza Quadrivalent, 6-35 Months IM 10/31/2016    • Influenza, injectable, quadrivalent, preservative free 0.5 mL 09/05/2020, 09/26/2023   • Influenza, recombinant, quadrivalent,injectable, preservative free 11/04/2021   • Pneumococcal Conjugate 13-Valent 09/16/2018   • Rabies 07/29/2014, 08/01/2014, 08/05/2014   • Tdap 07/29/2014, 12/17/2022, 06/16/2023      Health Maintenance:         Topic Date Due   • HIV Screening  Never done   • Cervical Cancer Screening  Never done   • Breast Cancer Screening: Mammogram  Never done   • Colorectal Cancer Screening  09/24/2025   • Hepatitis C Screening  Completed         Topic Date Due   • COVID-19 Vaccine (7 - 2023-24 season) 11/29/2023      Medicare Screening Tests and Risk Assessments:         Health Risk Assessment:   Patient rates overall health as fair. Patient feels that their physical health rating is slightly worse. Patient is dissatisfied with their life. Eyesight was rated as slightly worse. Hearing was rated as slightly worse. Patient feels that their emotional and mental health rating is slightly better. Patients states they are never, rarely angry. Patient states they are often unusually tired/fatigued. Pain experienced in the last 7 days has been none. Patient states that she has experienced no weight loss or gain in last 6 months.     Fall Risk Screening:   In the past year, patient has experienced: history of falling in past year      Urinary Incontinence Screening:   Patient has leaked urine accidently in the last six months.     Home Safety:  Patient has trouble with stairs inside or outside of their home. Patient has working smoke alarms     Nutrition:   Current diet is Regular.     Medications:   Patient is currently taking over-the-counter supplements. OTC medications include: see medication list. Patient is able to manage medications.     Activities of Daily Living (ADLs)/Instrumental Activities of Daily Living (IADLs):   Walk and transfer into and out of bed and chair?: Yes  Dress and groom yourself?:  Yes    Bathe or shower yourself?: Yes    Feed yourself? Yes  Do your laundry/housekeeping?: Yes  Manage your money, pay your bills and track your expenses?: Yes  Make your own meals?: Yes    Do your own shopping?: Yes    Previous Hospitalizations:   Any hospitalizations or ED visits within the last 12 months?: Yes    How many hospitalizations have you had in the last year?: 3-4    Advance Care Planning:   Living will: No    Durable POA for healthcare: No    Advanced directive: No    Advanced directive counseling given: Yes    ACP document given: Yes      Cognitive Screening:   Provider or family/friend/caregiver concerned regarding cognition?: No    PREVENTIVE SCREENINGS      Cardiovascular Screening:    General: Screening Not Indicated, History Lipid Disorder and Risks and Benefits Discussed    Due for: Lipid Panel      Diabetes Screening:     General: Screening Current and Risks and Benefits Discussed      Colorectal Cancer Screening:     General: Screening Current      Breast Cancer Screening:     General: Risks and Benefits Discussed    Due for: Mammogram        Osteoporosis Screening:    General: Screening Not Indicated and History Osteoporosis      Abdominal Aortic Aneurysm (AAA) Screening:        General: Screening Not Indicated      Lung Cancer Screening:     General: Screening Not Indicated      Hepatitis C Screening:    General: Screening Current    Screening, Brief Intervention, and Referral to Treatment (SBIRT)    Screening    Typical number of drinks in a week: 0    Brief Intervention  Alcohol & drug use screenings were reviewed. No concerns regarding substance use disorder identified.     SDOH Risk Assessment  Social determinants of health (SDOH) risk assesment tool was completed. The tool at a minimum covered housing stability, food insecurity, transportation needs, and utility difficulty. Patient had at risk responses for the following SDOH domains: housing stability and utilities.     Other  Counseling Topics:   Car/seat belt/driving safety, skin self-exam and sunscreen.     No results found.     Physical Exam:     /80 (BP Location: Left arm, Patient Position: Sitting, Cuff Size: Large)   Pulse 102   Wt 81.3 kg (179 lb 3.2 oz)   SpO2 99%   BMI 32.78 kg/m²     Physical Exam  Constitutional:       General: She is not in acute distress.  HENT:      Mouth/Throat:      Comments: No tongue or lip swelling  Pulmonary:      Effort: Pulmonary effort is normal. No respiratory distress.      Breath sounds: Normal breath sounds. No stridor. No wheezing.   Neurological:      Mental Status: She is alert.          Raheem Cain MD

## 2024-05-09 ENCOUNTER — HOSPITAL ENCOUNTER (INPATIENT)
Facility: HOSPITAL | Age: 64
LOS: 1 days | Discharge: HOME | DRG: 645 | End: 2024-05-10
Attending: EMERGENCY MEDICINE | Admitting: INTERNAL MEDICINE
Payer: COMMERCIAL

## 2024-05-09 ENCOUNTER — APPOINTMENT (EMERGENCY)
Dept: RADIOLOGY | Facility: HOSPITAL | Age: 64
DRG: 645 | End: 2024-05-09
Payer: COMMERCIAL

## 2024-05-09 DIAGNOSIS — I10 HTN (HYPERTENSION): ICD-10-CM

## 2024-05-09 DIAGNOSIS — E78.5 HLD (HYPERLIPIDEMIA): ICD-10-CM

## 2024-05-09 DIAGNOSIS — E87.1 HYPONATREMIA: ICD-10-CM

## 2024-05-09 DIAGNOSIS — J18.9 PNEUMONIA: Primary | ICD-10-CM

## 2024-05-09 PROBLEM — R05.9 COUGH: Status: ACTIVE | Noted: 2024-05-09

## 2024-05-09 LAB
ALBUMIN SERPL BCP-MCNC: 4.3 G/DL (ref 3.5–5)
ALP SERPL-CCNC: 81 U/L (ref 34–104)
ALT SERPL W P-5'-P-CCNC: 15 U/L (ref 7–52)
ANION GAP SERPL CALCULATED.3IONS-SCNC: 10 MMOL/L (ref 4–13)
ANION GAP SERPL CALCULATED.3IONS-SCNC: 7 MMOL/L (ref 4–13)
ANION GAP SERPL CALCULATED.3IONS-SCNC: 7 MMOL/L (ref 4–13)
AST SERPL W P-5'-P-CCNC: 19 U/L (ref 13–39)
ATRIAL RATE: 98 BPM
BASOPHILS # BLD AUTO: 0.02 THOUSANDS/ÂΜL (ref 0–0.1)
BASOPHILS NFR BLD AUTO: 0 % (ref 0–1)
BILIRUB SERPL-MCNC: 0.41 MG/DL (ref 0.2–1)
BILIRUB UR QL STRIP: NEGATIVE
BNP SERPL-MCNC: 8 PG/ML (ref 0–100)
BUN SERPL-MCNC: 11 MG/DL (ref 5–25)
BUN SERPL-MCNC: 7 MG/DL (ref 5–25)
BUN SERPL-MCNC: 8 MG/DL (ref 5–25)
CALCIUM SERPL-MCNC: 10 MG/DL (ref 8.4–10.2)
CALCIUM SERPL-MCNC: 10.2 MG/DL (ref 8.4–10.2)
CALCIUM SERPL-MCNC: 9.9 MG/DL (ref 8.4–10.2)
CARDIAC TROPONIN I PNL SERPL HS: 3 NG/L
CHLORIDE SERPL-SCNC: 87 MMOL/L (ref 96–108)
CHLORIDE SERPL-SCNC: 93 MMOL/L (ref 96–108)
CHLORIDE SERPL-SCNC: 94 MMOL/L (ref 96–108)
CLARITY UR: CLEAR
CO2 SERPL-SCNC: 26 MMOL/L (ref 21–32)
CO2 SERPL-SCNC: 28 MMOL/L (ref 21–32)
CO2 SERPL-SCNC: 29 MMOL/L (ref 21–32)
COLOR UR: COLORLESS
CREAT SERPL-MCNC: 0.53 MG/DL (ref 0.6–1.3)
CREAT SERPL-MCNC: 0.59 MG/DL (ref 0.6–1.3)
CREAT SERPL-MCNC: 0.64 MG/DL (ref 0.6–1.3)
CREAT UR-MCNC: 15.7 MG/DL
EOSINOPHIL # BLD AUTO: 0.01 THOUSAND/ÂΜL (ref 0–0.61)
EOSINOPHIL NFR BLD AUTO: 0 % (ref 0–6)
ERYTHROCYTE [DISTWIDTH] IN BLOOD BY AUTOMATED COUNT: 13.6 % (ref 11.6–15.1)
GFR SERPL CREATININE-BSD FRML MDRD: 101 ML/MIN/1.73SQ M
GFR SERPL CREATININE-BSD FRML MDRD: 95 ML/MIN/1.73SQ M
GFR SERPL CREATININE-BSD FRML MDRD: 97 ML/MIN/1.73SQ M
GLUCOSE SERPL-MCNC: 102 MG/DL (ref 65–140)
GLUCOSE SERPL-MCNC: 104 MG/DL (ref 65–140)
GLUCOSE SERPL-MCNC: 116 MG/DL (ref 65–140)
GLUCOSE UR STRIP-MCNC: NEGATIVE MG/DL
HCT VFR BLD AUTO: 36 % (ref 34.8–46.1)
HGB BLD-MCNC: 12.6 G/DL (ref 11.5–15.4)
HGB UR QL STRIP.AUTO: NEGATIVE
IMM GRANULOCYTES # BLD AUTO: 0.01 THOUSAND/UL (ref 0–0.2)
IMM GRANULOCYTES NFR BLD AUTO: 0 % (ref 0–2)
KETONES UR STRIP-MCNC: NEGATIVE MG/DL
LEUKOCYTE ESTERASE UR QL STRIP: NEGATIVE
LYMPHOCYTES # BLD AUTO: 0.33 THOUSANDS/ÂΜL (ref 0.6–4.47)
LYMPHOCYTES NFR BLD AUTO: 7 % (ref 14–44)
MAGNESIUM SERPL-MCNC: 1.6 MG/DL (ref 1.9–2.7)
MCH RBC QN AUTO: 27.7 PG (ref 26.8–34.3)
MCHC RBC AUTO-ENTMCNC: 35 G/DL (ref 31.4–37.4)
MCV RBC AUTO: 79 FL (ref 82–98)
MONOCYTES # BLD AUTO: 0.22 THOUSAND/ÂΜL (ref 0.17–1.22)
MONOCYTES NFR BLD AUTO: 5 % (ref 4–12)
NEUTROPHILS # BLD AUTO: 4.16 THOUSANDS/ÂΜL (ref 1.85–7.62)
NEUTS SEG NFR BLD AUTO: 88 % (ref 43–75)
NITRITE UR QL STRIP: NEGATIVE
NRBC BLD AUTO-RTO: 0 /100 WBCS
OSMOLALITY UR: 86 MMOL/KG
P AXIS: 49 DEGREES
PH UR STRIP.AUTO: 6.5 [PH]
PHOSPHATE SERPL-MCNC: 3.4 MG/DL (ref 2.3–4.1)
PLATELET # BLD AUTO: 324 THOUSANDS/UL (ref 149–390)
PMV BLD AUTO: 8.7 FL (ref 8.9–12.7)
POTASSIUM SERPL-SCNC: 3.4 MMOL/L (ref 3.5–5.3)
POTASSIUM SERPL-SCNC: 3.6 MMOL/L (ref 3.5–5.3)
POTASSIUM SERPL-SCNC: 4 MMOL/L (ref 3.5–5.3)
PR INTERVAL: 132 MS
PROCALCITONIN SERPL-MCNC: <0.05 NG/ML
PROT SERPL-MCNC: 7.1 G/DL (ref 6.4–8.4)
PROT UR STRIP-MCNC: NEGATIVE MG/DL
QRS AXIS: -9 DEGREES
QRSD INTERVAL: 78 MS
QT INTERVAL: 360 MS
QTC INTERVAL: 459 MS
RBC # BLD AUTO: 4.55 MILLION/UL (ref 3.81–5.12)
SODIUM 24H UR-SCNC: 15 MOL/L
SODIUM SERPL-SCNC: 123 MMOL/L (ref 135–147)
SODIUM SERPL-SCNC: 129 MMOL/L (ref 135–147)
SODIUM SERPL-SCNC: 129 MMOL/L (ref 135–147)
SP GR UR STRIP.AUTO: 1 (ref 1–1.03)
T WAVE AXIS: 46 DEGREES
TSH SERPL DL<=0.05 MIU/L-ACNC: 0.49 UIU/ML (ref 0.45–4.5)
UROBILINOGEN UR STRIP-ACNC: <2 MG/DL
VENTRICULAR RATE: 98 BPM
WBC # BLD AUTO: 4.75 THOUSAND/UL (ref 4.31–10.16)

## 2024-05-09 PROCEDURE — 71046 X-RAY EXAM CHEST 2 VIEWS: CPT

## 2024-05-09 PROCEDURE — 82570 ASSAY OF URINE CREATININE: CPT

## 2024-05-09 PROCEDURE — 83880 ASSAY OF NATRIURETIC PEPTIDE: CPT

## 2024-05-09 PROCEDURE — 96374 THER/PROPH/DIAG INJ IV PUSH: CPT

## 2024-05-09 PROCEDURE — 93005 ELECTROCARDIOGRAM TRACING: CPT

## 2024-05-09 PROCEDURE — 99223 1ST HOSP IP/OBS HIGH 75: CPT | Performed by: INTERNAL MEDICINE

## 2024-05-09 PROCEDURE — 36415 COLL VENOUS BLD VENIPUNCTURE: CPT

## 2024-05-09 PROCEDURE — 96375 TX/PRO/DX INJ NEW DRUG ADDON: CPT

## 2024-05-09 PROCEDURE — 99285 EMERGENCY DEPT VISIT HI MDM: CPT | Performed by: EMERGENCY MEDICINE

## 2024-05-09 PROCEDURE — 93010 ELECTROCARDIOGRAM REPORT: CPT | Performed by: INTERNAL MEDICINE

## 2024-05-09 PROCEDURE — RECHECK: Performed by: INTERNAL MEDICINE

## 2024-05-09 PROCEDURE — 81003 URINALYSIS AUTO W/O SCOPE: CPT

## 2024-05-09 PROCEDURE — 83935 ASSAY OF URINE OSMOLALITY: CPT | Performed by: INTERNAL MEDICINE

## 2024-05-09 PROCEDURE — 84100 ASSAY OF PHOSPHORUS: CPT

## 2024-05-09 PROCEDURE — NC001 PR NO CHARGE: Performed by: INTERNAL MEDICINE

## 2024-05-09 PROCEDURE — 83735 ASSAY OF MAGNESIUM: CPT

## 2024-05-09 PROCEDURE — 80048 BASIC METABOLIC PNL TOTAL CA: CPT | Performed by: NURSE PRACTITIONER

## 2024-05-09 PROCEDURE — 84300 ASSAY OF URINE SODIUM: CPT

## 2024-05-09 PROCEDURE — 80053 COMPREHEN METABOLIC PANEL: CPT

## 2024-05-09 PROCEDURE — 84484 ASSAY OF TROPONIN QUANT: CPT

## 2024-05-09 PROCEDURE — 99222 1ST HOSP IP/OBS MODERATE 55: CPT | Performed by: INTERNAL MEDICINE

## 2024-05-09 PROCEDURE — 99284 EMERGENCY DEPT VISIT MOD MDM: CPT

## 2024-05-09 PROCEDURE — 94640 AIRWAY INHALATION TREATMENT: CPT

## 2024-05-09 PROCEDURE — 85025 COMPLETE CBC W/AUTO DIFF WBC: CPT

## 2024-05-09 PROCEDURE — 84145 PROCALCITONIN (PCT): CPT | Performed by: INTERNAL MEDICINE

## 2024-05-09 PROCEDURE — 84443 ASSAY THYROID STIM HORMONE: CPT

## 2024-05-09 RX ORDER — ZIPRASIDONE HYDROCHLORIDE 40 MG/1
80 CAPSULE ORAL DAILY
Status: DISCONTINUED | OUTPATIENT
Start: 2024-05-09 | End: 2024-05-10 | Stop reason: HOSPADM

## 2024-05-09 RX ORDER — LANOLIN ALCOHOL/MO/W.PET/CERES
400 CREAM (GRAM) TOPICAL DAILY
Status: DISCONTINUED | OUTPATIENT
Start: 2024-05-09 | End: 2024-05-10 | Stop reason: HOSPADM

## 2024-05-09 RX ORDER — CALCIUM CARBONATE 500 MG/1
500 TABLET, CHEWABLE ORAL DAILY PRN
Status: DISCONTINUED | OUTPATIENT
Start: 2024-05-09 | End: 2024-05-10 | Stop reason: HOSPADM

## 2024-05-09 RX ORDER — SUCRALFATE 1 G/1
1 TABLET ORAL
Status: DISCONTINUED | OUTPATIENT
Start: 2024-05-09 | End: 2024-05-10 | Stop reason: HOSPADM

## 2024-05-09 RX ORDER — BENZONATATE 100 MG/1
100 CAPSULE ORAL 3 TIMES DAILY
Status: DISCONTINUED | OUTPATIENT
Start: 2024-05-09 | End: 2024-05-10 | Stop reason: HOSPADM

## 2024-05-09 RX ORDER — ACETAMINOPHEN 325 MG/1
650 TABLET ORAL EVERY 6 HOURS PRN
Status: DISCONTINUED | OUTPATIENT
Start: 2024-05-09 | End: 2024-05-10 | Stop reason: HOSPADM

## 2024-05-09 RX ORDER — ONDANSETRON 2 MG/ML
4 INJECTION INTRAMUSCULAR; INTRAVENOUS ONCE
Status: COMPLETED | OUTPATIENT
Start: 2024-05-09 | End: 2024-05-09

## 2024-05-09 RX ORDER — LORAZEPAM 1 MG/1
2 TABLET ORAL EVERY 4 HOURS PRN
Status: DISCONTINUED | OUTPATIENT
Start: 2024-05-09 | End: 2024-05-10 | Stop reason: HOSPADM

## 2024-05-09 RX ORDER — BENZONATATE 100 MG/1
100 CAPSULE ORAL 3 TIMES DAILY PRN
Status: DISCONTINUED | OUTPATIENT
Start: 2024-05-09 | End: 2024-05-09

## 2024-05-09 RX ORDER — MAGNESIUM SULFATE 1 G/100ML
1 INJECTION INTRAVENOUS ONCE
Status: COMPLETED | OUTPATIENT
Start: 2024-05-09 | End: 2024-05-09

## 2024-05-09 RX ORDER — AMLODIPINE BESYLATE 5 MG/1
5 TABLET ORAL DAILY
Status: DISCONTINUED | OUTPATIENT
Start: 2024-05-09 | End: 2024-05-10 | Stop reason: HOSPADM

## 2024-05-09 RX ORDER — QUETIAPINE 400 MG/1
400 TABLET, FILM COATED, EXTENDED RELEASE ORAL
Qty: 90 TABLET | Refills: 3 | Status: SHIPPED | OUTPATIENT
Start: 2024-05-09

## 2024-05-09 RX ORDER — LEVOTHYROXINE SODIUM 0.03 MG/1
25 TABLET ORAL
Status: DISCONTINUED | OUTPATIENT
Start: 2024-05-09 | End: 2024-05-10 | Stop reason: HOSPADM

## 2024-05-09 RX ORDER — PANTOPRAZOLE SODIUM 40 MG/1
40 TABLET, DELAYED RELEASE ORAL
Status: DISCONTINUED | OUTPATIENT
Start: 2024-05-09 | End: 2024-05-10

## 2024-05-09 RX ORDER — FAMOTIDINE 20 MG/1
20 TABLET, FILM COATED ORAL ONCE
Status: COMPLETED | OUTPATIENT
Start: 2024-05-09 | End: 2024-05-09

## 2024-05-09 RX ORDER — ALBUTEROL SULFATE 2.5 MG/3ML
5 SOLUTION RESPIRATORY (INHALATION) ONCE
Status: COMPLETED | OUTPATIENT
Start: 2024-05-09 | End: 2024-05-09

## 2024-05-09 RX ORDER — PAROXETINE HYDROCHLORIDE 20 MG/1
60 TABLET, FILM COATED ORAL DAILY
Status: DISCONTINUED | OUTPATIENT
Start: 2024-05-10 | End: 2024-05-09

## 2024-05-09 RX ORDER — FAMOTIDINE 20 MG/1
20 TABLET, FILM COATED ORAL DAILY
Status: DISCONTINUED | OUTPATIENT
Start: 2024-05-09 | End: 2024-05-10 | Stop reason: HOSPADM

## 2024-05-09 RX ORDER — ZIPRASIDONE HYDROCHLORIDE 80 MG/1
CAPSULE ORAL
Qty: 90 CAPSULE | Refills: 3 | Status: SHIPPED | OUTPATIENT
Start: 2024-05-09

## 2024-05-09 RX ADMIN — LEVOTHYROXINE SODIUM 25 MCG: 25 TABLET ORAL at 08:11

## 2024-05-09 RX ADMIN — DEXTROSE 250 ML: 5 SOLUTION INTRAVENOUS at 20:55

## 2024-05-09 RX ADMIN — FAMOTIDINE 20 MG: 20 TABLET, FILM COATED ORAL at 04:44

## 2024-05-09 RX ADMIN — FAMOTIDINE 20 MG: 20 TABLET, FILM COATED ORAL at 08:10

## 2024-05-09 RX ADMIN — AMLODIPINE BESYLATE 5 MG: 5 TABLET ORAL at 08:10

## 2024-05-09 RX ADMIN — PANTOPRAZOLE SODIUM 40 MG: 40 TABLET, DELAYED RELEASE ORAL at 17:39

## 2024-05-09 RX ADMIN — FOLIC ACID TAB 400 MCG 400 MCG: 400 TAB at 08:11

## 2024-05-09 RX ADMIN — DEXTROSE 500 ML: 5 SOLUTION INTRAVENOUS at 15:39

## 2024-05-09 RX ADMIN — IPRATROPIUM BROMIDE 0.5 MG: 0.5 SOLUTION RESPIRATORY (INHALATION) at 04:44

## 2024-05-09 RX ADMIN — MAGNESIUM SULFATE HEPTAHYDRATE 1 G: 1 INJECTION, SOLUTION INTRAVENOUS at 08:12

## 2024-05-09 RX ADMIN — ZIPRASIDONE HYDROCHLORIDE 80 MG: 40 CAPSULE ORAL at 08:11

## 2024-05-09 RX ADMIN — SUCRALFATE 1 G: 1 TABLET ORAL at 17:40

## 2024-05-09 RX ADMIN — CEFTRIAXONE 2000 MG: 1 INJECTION, POWDER, FOR SOLUTION INTRAMUSCULAR; INTRAVENOUS at 05:50

## 2024-05-09 RX ADMIN — BENZONATATE 100 MG: 100 CAPSULE ORAL at 21:00

## 2024-05-09 RX ADMIN — SODIUM CHLORIDE 1000 ML: 0.9 INJECTION, SOLUTION INTRAVENOUS at 05:21

## 2024-05-09 RX ADMIN — ALBUTEROL SULFATE 5 MG: 2.5 SOLUTION RESPIRATORY (INHALATION) at 04:44

## 2024-05-09 RX ADMIN — PANTOPRAZOLE SODIUM 40 MG: 40 TABLET, DELAYED RELEASE ORAL at 08:14

## 2024-05-09 RX ADMIN — ONDANSETRON 4 MG: 2 INJECTION INTRAMUSCULAR; INTRAVENOUS at 04:44

## 2024-05-09 NOTE — QUICK NOTE
Sodium stable at 129 , relaxed free water restriction to 1.5 lts/day. Ordered D5w 250 ml bolus. Goal by tomorrow am around 131 meq/L.

## 2024-05-09 NOTE — ASSESSMENT & PLAN NOTE
Sodium 123 on labs   Reviewed patient's previous ER visits and hospitalizations and appears patient has had at least 2 prior hospitalizations in the last year for hyponatremia; appears from hospitalization in September 2023 as well as in January 2024 there was concern that patient's underlying psychiatric medications may be playing a role  In the hospitalization from September 2023 the etiology was thought secondary to polydipsia and patient was placed on a 1.2 L of free water restriction which improved her sodium  In the hospitalization in January 2024, the etiology was thought to be multifactorial from both a component of psychogenic polydipsia as well as mild hypovolemia  Admit to medicine.  Check urine sodium, urine osmolality, TSH.  For now with urine studies pending, we will err on the side of etiology secondary to polydipsia given patient's history and as such we will put on a 1.2 L free water restriction and repeat a BMP every 6 hours.  We will additionally hold any NSAIDs as well as her PTA Paxil overnight.  Consider nephrology consultation.

## 2024-05-09 NOTE — ASSESSMENT & PLAN NOTE
Presented to the ER with cough  Afebrile, no leukocytosis  Initial concern in the ER for possible developing pneumonia and patient was given a dose of ceftriaxone  For now, we will order a procalcitonin but hold off of additional antibiotics and await official read of chest x-ray as well as monitor patient's vitals as clinically she does not appear to have a bacterial pneumonia.  If procalcitonin elevated and on official read there is concern for an infiltrate, will need consideration for restarting of antibiotics.

## 2024-05-09 NOTE — PROGRESS NOTES
Repeat sodium 129 with fluid restriction only.  Slow the rate of rise will give 500 cc of D5 water over 5 hours.  Repeat BMP as scheduled every 6 hours: next 1800

## 2024-05-09 NOTE — CONSULTS
Consultation - Nephrology   Irene Plascencia 63 y.o. female MRN: [id number]  Unit/Bed#: [facility] 620-01 Encounter: [id number]    ASSESSMENT and PLAN:  Acute on chronic hyponatremia  Etiology: Suspect secondary to psychogenic polydipsia, SSRI use with Celexa and Paxil, along with NSAID use with Celebrex,  Baseline sodium mainly 130-135 with intermittent normal sodium periodically.  Can be as low as 119 dating back to 2012-not on treatment as outpatient  Admission sodium 123 on 5/9/2024  Patient had prior admission with hyponatremia corrected with fluid restriction only  Patient not symptomatic with nausea ,vomiting, headaches  Workup:  Urine osmolarity: 86  Urine sodium 15  TSH-0.492  Previous cortisol level-01/13/2024 was 5.4  Plan  Agree with 1.2 L fluid restriction  Patient to start BMP every 6 hours at noon today  Will place call parameters to renal if sodium less than 123 or greater than 129  Will check a.m. cortisol level to rule out renal insufficiency     Blood pressure/hypertension:  Current BP well since admission  Home medications include: Amlodipine 5 mg daily,  Current medications include: Amlodipine 5 mg daily  Maximize hemodynamics to maintain MAP >65  Avoid hypotension or fluctuations in blood pressure  Will continue to trend     Schizophrenic disorder  Outpatient regimen includes: Seroquel 400 mg nightly, Paxil 60 mg daily, Geodon 80 mg daily, and lorazepam 2 mg every 6 hours  Primary team patient reported that she has not seen her psychiatrist in 3 years  Consider neuropsych eval while admitted     Concussion with syndrome  On Celebrex 200 mg daily  Management per primary team     Overall plan and recommendations: Discussed with primary team who is in agreement the plan as below  Continue with fluid restriction 1.2 liters/daily  Agree to check BMP every 6 hours following at noon with call paramters  Avoid rapid correction of sodium to prevent osmotic demyelination  Check cortisol level in a.m. along  "with phosphorus and magnesium      Medical records through [facility] and Care Everywhere has been reviewed for this patient encounter    HISTORY OF PRESENT ILLNESS:  Requesting Physician: Clarence Navas DO  Reason for Consult: Acute hyponatremia    Irene Plascencia is a 63 y.o. female who has PMH of hyponatremia, hypertension, hypothyroidism, concussive syndrome since July 2023, schizoaffective disorder who presented to the ER with c/o of cough and concerns regarding a latex allergy.  Workup revealed low sodium 123 and a renal consultation is requested for hyponatremia.  On discussion, patient reports drinking \" a lot\".  At least 2 liters of diet coke and 2 large bottles of water at least 20 oz each.  She reports some chest discomfort, SOB and cough.  Denies head ache, nausea or vomiting or vision issues.      PAST MEDICAL HISTORY:  Past Medical History:   Diagnosis Date    Anemia     Anxiety     Arthritis     Back pain at L4-L5 level     Schreiber esophagus     Bulimia     Colon polyp     Disease of thyroid gland     hypothyroid    GERD (gastroesophageal reflux disease)     Hearing loss in left ear     History of transfusion     Hyperlipidemia     Hypertension     Hypothyroidism     Leukopenia     NMS (neuroleptic malignant syndrome) 01/03/2020    Pneumonia     RA (rheumatoid arthritis) (HCC)     in feet    Sciatica     Seizure (HCC)     due to medication mix up 8/2018 only one historically    Skin spots, red     lower right arm - poss from cat- no s/s infection    Synovial cyst of lumbar spine     Vertigo     Wears dentures     full set    Weight gain        PAST SURGICAL HISTORY:  Past Surgical History:   Procedure Laterality Date    BONE MARROW BIOPSY      BREAST SURGERY      enlargement with implants    CLOSED REDUCTION DISTAL FEMUR FRACTURE      COLONOSCOPY      COSMETIC SURGERY      DENTAL SURGERY      HIP ARTHROPLASTY Right 01/02/2020    Procedure: REVISION TOTAL HIP ARTHROPLASTY WITH REPAIR OF PARIPROSTHETIC " FRACTURE - POSTERIOR APPROACH;  Surgeon: Dilan Pedersen MD;  [facility];  Service: Orthopedics    ND AMPUTATION METATARSAL W/TOE SINGLE Left 04/07/2023    Procedure: AMPUTATION TOE 4th;  Surgeon: Conner Bartlett DPM;  Location:  MAIN OR;  Service: Podiatry    ND ARTHRP ACETBLR/PROX FEM PROSTC AGRFT/ALGRFT Right 12/11/2019    Procedure: ARTHROPLASTY HIP TOTAL ANTERIOR;  Surgeon: Dilan Pedersen MD;  [facility];  Service: Orthopedics    ND ESOPHAGOGASTRODUODENOSCOPY TRANSORAL DIAGNOSTIC N/A 05/11/2021    Procedure: ESOPHAGOGASTRODUODENOSCOPY (EGD);  Surgeon: David Cedeño MD;  [facility];  Service: General    ND LAPS RPR PARAESPHGL HRNA INCL FUNDPLSTY W/MESH N/A 05/11/2021    Procedure: REPAIR HERNIA PARAESOPHAGEAL  LAPAROSCOPIC;  Surgeon: David Cedeño MD;  [facility];  Service: General    ND UNLISTED PROCEDURE ESOPHAGUS N/A 05/11/2021    Procedure: FUNDOPLICATION TRANSORAL INCISIONLESS;  Surgeon: Brad Cadet MD;  [facility];  Service: Gastroenterology    RHINOPLASTY      SINUS SURGERY      TOE AMPUTATION Left     2023 4th toe    TRANSORAL INCISIONLESS FUNDOPLICATION (TIF)  05/11/2021       ALLERGIES:  Allergies   Allergen Reactions    Risperdal [Risperidone] Shortness Of Breath     Rapid heart beat, SOB    Zyprexa [Olanzapine] Shortness Of Breath     Rapid heartbeat    Latex Rash and Tongue Swelling     Band aids, adhesives wears normal underwear, can eat bananas  USE PAPER TAPE       SOCIAL HISTORY:  Social History     Substance and Sexual Activity   Alcohol Use Not Currently     Social History     Substance and Sexual Activity   Drug Use Not Currently     Social History     Tobacco Use   Smoking Status Never    Passive exposure: Past   Smokeless Tobacco Never       FAMILY HISTORY:  Family History   Problem Relation Age of Onset    Uterine cancer Mother     Esophageal cancer Father     Colon cancer Maternal Grandmother        MEDICATIONS:    Current  Facility-Administered Medications:     acetaminophen (TYLENOL) tablet 650 mg, 650 mg, Oral, Q6H PRN, Clarence Navas DO    amLODIPine (NORVASC) tablet 5 mg, 5 mg, Oral, Daily, Clarence Navas DO, 5 mg at 05/09/24 0810    benzonatate (TESSALON PERLES) capsule 100 mg, 100 mg, Oral, TID PRN, Clarence Navas DO    famotidine (PEPCID) tablet 20 mg, 20 mg, Oral, Daily, Clarence Navas DO, 20 mg at 05/09/24 0810    folic acid (FOLVITE) tablet 400 mcg, 400 mcg, Oral, Daily, Clarence Navas DO, 400 mcg at 05/09/24 0811    levothyroxine tablet 25 mcg, 25 mcg, Oral, Early Morning, Clarence Navas DO, 25 mcg at 05/09/24 0811    LORazepam (ATIVAN) tablet 2 mg, 2 mg, Oral, Q4H PRN, Clarence Navas DO    pantoprazole (PROTONIX) EC tablet 40 mg, 40 mg, Oral, BID AC, Clarence Navas DO, 40 mg at 05/09/24 0814    ziprasidone (GEODON) capsule 80 mg, 80 mg, Oral, Daily, Clarence Navas DO, 80 mg at 05/09/24 0811      REVIEW OF SYSTEMS:  Patient seen and examined at bedside  Review of Systems   Constitutional: Negative.  Negative for activity change, appetite change, chills, diaphoresis, fatigue and fever.   HENT: Negative.  Negative for congestion and facial swelling.    Respiratory:  Positive for cough, chest tightness and shortness of breath.    Cardiovascular: Negative.    Gastrointestinal: Negative.    Endocrine: Negative.    Genitourinary: Negative.    Musculoskeletal: Negative.    Skin: Negative.    Allergic/Immunologic: Negative.    Neurological: Negative.    Hematological: Negative.    Psychiatric/Behavioral: Negative.           PHYSICAL EXAM:  Current weight:    Vitals:    05/09/24 0429 05/09/24 0810 05/09/24 0815 05/09/24 1012   BP:  117/71 117/71 135/80   BP Location:       Pulse:   96 93   Resp:   20 16   Temp:    97.6 °F (36.4 °C)   TempSrc:       SpO2: 98%  95% 91%       Physical Exam  Vitals reviewed.   Constitutional:       Appearance: Normal appearance. She is obese.   HENT:      Head: Normocephalic and atraumatic.      Nose:  "Nose normal.      Mouth/Throat:      Mouth: Mucous membranes are moist.      Pharynx: Oropharynx is clear.   Eyes:      Extraocular Movements: Extraocular movements intact.      Conjunctiva/sclera: Conjunctivae normal.   Cardiovascular:      Rate and Rhythm: Normal rate and regular rhythm.      Pulses: Normal pulses.      Heart sounds: Normal heart sounds.   Pulmonary:      Effort: Pulmonary effort is normal.      Breath sounds: Normal breath sounds.   Abdominal:      General: Bowel sounds are normal.      Palpations: Abdomen is soft.   Musculoskeletal:         General: Normal range of motion.      Cervical back: Normal range of motion and neck supple.   Skin:     General: Skin is warm and dry.   Neurological:      General: No focal deficit present.      Mental Status: She is alert and oriented to person, place, and time.   Psychiatric:         Mood and Affect: Mood normal.         Behavior: Behavior normal.              Lab Results:   Results from last 7 days   Lab Units 05/09/24  0444   WBC Thousand/uL 4.75   HEMOGLOBIN g/dL 12.6   HEMATOCRIT % 36.0   PLATELETS Thousands/uL 324   SODIUM mmol/L 123*   POTASSIUM mmol/L 3.4*   CHLORIDE mmol/L 87*   CO2 mmol/L 26   BUN mg/dL 7   CREATININE mg/dL 0.53*   CALCIUM mg/dL 10.2   ALK PHOS U/L 81   ALT U/L 15   AST U/L 19         Portions of the record may have been created with voice recognition software. Occasional wrong word or \"sound a like\" substitutions may have occurred due to the inherent limitations of voice recognition software. Read the chart carefully and recognize,   "

## 2024-05-09 NOTE — PROGRESS NOTES
Post admit check    Patient is a 63-year pleasant female presenting with cough, presented reports feeling weak.  Denies fevers.  She is noted to have hyponatremia sodium 123.    History chart labs medications reviewed    Vitals:    05/09/24 0429 05/09/24 0810 05/09/24 0815 05/09/24 1012   BP:  117/71 117/71 135/80   BP Location:       Pulse:   96 93   Resp:   20 16   Temp:    97.6 °F (36.4 °C)   TempSrc:       SpO2: 98%  95% 91%        Presently comfortably in bed.  Obese.  Short thick neck.  Lungs diminished breath sounds bilaterally.  Heart sounds S1-S2 noted.  Heart sounds are distant.  Abdomen soft.  Abdominal obesity noted.  Awake follows commands.  No pedal edema.  No rash.    A/P    Hyponatremia sodium 123 -multifactorial with possible polydipsia/medications contributing.  Presently on fluid restriction.  Will request nephrology evaluation.  Communicated with nephrology via [place] [facility].  Monitor sodium levels closely.  Cough received empirical antibiotics in the ER for possible pneumonia.  Monitor off of antibiotics.  Monitor counts temperatures procalcitonin levels.  Supportive cares.  Schizoaffective disorder  Hypothyroidism continue levothyroxine    Discussed with the patient, called left a message for mother Anya Ivanlisa

## 2024-05-09 NOTE — H&P
[facility]  H&P  Name: Irene Plascencia 63 y.o. female I MRN: [id number]  Unit/Bed#: ED 05 I Date of Admission: 5/9/2024   Date of Service: 5/9/2024 I Hospital Day: 0      Assessment/Plan   * Hyponatremia  Assessment & Plan  Sodium 123 on labs   Reviewed patient's previous ER visits and hospitalizations and appears patient has had at least 2 prior hospitalizations in the last year for hyponatremia; appears from hospitalization in September 2023 as well as in January 2024 there was concern that patient's underlying psychiatric medications may be playing a role  In the hospitalization from September 2023 the etiology was thought secondary to polydipsia and patient was placed on a 1.2 L of free water restriction which improved her sodium  In the hospitalization in January 2024, the etiology was thought to be multifactorial from both a component of psychogenic polydipsia as well as mild hypovolemia  Admit to medicine.  Check urine sodium, urine osmolality, TSH.  For now with urine studies pending, we will err on the side of etiology secondary to polydipsia given patient's history and as such we will put on a 1.2 L free water restriction and repeat a BMP every 6 hours.  We will additionally hold any NSAIDs as well as her PTA Paxil overnight.  Consider nephrology consultation.    Cough  Assessment & Plan  Presented to the ER with cough  Afebrile, no leukocytosis  Initial concern in the ER for possible developing pneumonia and patient was given a dose of ceftriaxone  For now, we will order a procalcitonin but hold off of additional antibiotics and await official read of chest x-ray as well as monitor patient's vitals as clinically she does not appear to have a bacterial pneumonia.  If procalcitonin elevated and on official read there is concern for an infiltrate, will need consideration for restarting of antibiotics.    Schizoaffective disorder (HCC)  Assessment & Plan  Patient's  PTA psychiatric meds include Ativan 2 mg by mouth every 4 hours as needed for anxiety, Paxil 60 mg daily, Seroquel 400 mg by mouth daily at bedtime (patient reports that she stopped taking this and is no longer on this), topiramate (patient reports that she stopped taking this and is no longer on this), as well as ziprasidone 80 mg daily  Will put on patient's PTA Ativan as well as her ziprasidone  We will hold the medications that she says she is no longer taking  We will additionally hold her Paxil overnight as this could contribute to SIADH    Acquired hypothyroidism  Assessment & Plan  Check TSH  Continue pta levothyroxine                VTE Prophylaxis: sequential compression device and foot pump applied   Code Status: Level 1 - Full Code       Anticipated Length of Stay:  Patient will be admitted on an Inpatient basis with an anticipated length of stay of  > 2 midnights.   Justification for Hospital Stay: Please see detailed plans noted above.    Chief Complaint:     Hyponatremia  History of Present Illness:  Irene Plascencia is a 63 y.o. female who has past medical history significant for schizoaffective disorder, hypothyroidism who presented to [facility] ER on the early morning hours of 5/9 with cough as well as concerns regarding a latex allergy.  Initial concern in the ER for possible developing pneumonia and patient was started on ceftriaxone.  On labs, patient found to have hyponatremia with sodium of 123.  From review of patient's chart, it appears she has previously been hospitalized twice in the last year for episodes of hyponatremia that were found to be related to her underlying psychiatric medications as well as with components of polydipsia. Denies any fevers or chills.       Review of Systems:    Constitutional:  Denies fever or chills   Eyes:  Denies change in visual acuity   HENT:  Denies nasal congestion or sore throat   Respiratory: Positive for cough  Cardiovascular:  Denies  chest pain or edema   GI:  Denies abdominal pain or bloody stools  :  Denies dysuria   Musculoskeletal:  Denies back pain or joint pain   Integument:  Denies rash   Neurologic:  Denies headache or sensory changes   Endocrine:  Denies polyuria or polydipsia   Lymphatic:  Denies swollen glands   Psychiatric:  Denies depression or anxiety     Past Medical and Surgical History:   Past Medical History:   Diagnosis Date    Anemia     Anxiety     Arthritis     Back pain at L4-L5 level     Schreiber esophagus     Bulimia     Colon polyp     Disease of thyroid gland     hypothyroid    GERD (gastroesophageal reflux disease)     Hearing loss in left ear     History of transfusion     Hyperlipidemia     Hypertension     Hypothyroidism     Leukopenia     NMS (neuroleptic malignant syndrome) 01/03/2020    Pneumonia     RA (rheumatoid arthritis) (HCC)     in feet    Sciatica     Seizure (HCC)     due to medication mix up 8/2018 only one historically    Skin spots, red     lower right arm - poss from cat- no s/s infection    Synovial cyst of lumbar spine     Vertigo     Wears dentures     full set    Weight gain      Past Surgical History:   Procedure Laterality Date    BONE MARROW BIOPSY      BREAST SURGERY      enlargement with implants    CLOSED REDUCTION DISTAL FEMUR FRACTURE      COLONOSCOPY      COSMETIC SURGERY      DENTAL SURGERY      HIP ARTHROPLASTY Right 01/02/2020    Procedure: REVISION TOTAL HIP ARTHROPLASTY WITH REPAIR OF PARIPROSTHETIC FRACTURE - POSTERIOR APPROACH;  Surgeon: Dilan Pedersen MD;  [facility];  Service: Orthopedics    OH AMPUTATION METATARSAL W/TOE SINGLE Left 04/07/2023    Procedure: AMPUTATION TOE 4th;  Surgeon: Conner Bartlett DPM;  Location:  MAIN OR;  Service: Podiatry    OH ARTHRP ACETBLR/PROX FEM PROSTC AGRFT/ALGRFT Right 12/11/2019    Procedure: ARTHROPLASTY HIP TOTAL ANTERIOR;  Surgeon: Dilan Pedersen MD;  Location:  MAIN OR;  Service: Orthopedics    OH  ESOPHAGOGASTRODUODENOSCOPY TRANSORAL DIAGNOSTIC N/A 05/11/2021    Procedure: ESOPHAGOGASTRODUODENOSCOPY (EGD);  Surgeon: David Cedeño MD;  [facility]: [facility];  Service: General    WV LAPS RPR PARAESPHGL HRNA INCL FUNDPLSTY W/MESH N/A 05/11/2021    Procedure: REPAIR HERNIA PARAESOPHAGEAL  LAPAROSCOPIC;  Surgeon: David Cedeño MD;  [facility];  Service: General    WV UNLISTED PROCEDURE ESOPHAGUS N/A 05/11/2021    Procedure: FUNDOPLICATION TRANSORAL INCISIONLESS;  Surgeon: Brad Cadet MD;  [facility];  Service: Gastroenterology    RHINOPLASTY      SINUS SURGERY      TOE AMPUTATION Left     2023 4th toe    TRANSORAL INCISIONLESS FUNDOPLICATION (TIF)  05/11/2021       Meds/Allergies:  (Not in a hospital admission)      Allergies:   Allergies   Allergen Reactions    Risperdal [Risperidone] Shortness Of Breath     Rapid heart beat, SOB    Zyprexa [Olanzapine] Shortness Of Breath     Rapid heartbeat    Latex Rash and Tongue Swelling     Band aids, adhesives wears normal underwear, can eat bananas  USE PAPER TAPE       History:  Marital Status: Single     Substance Use History:   Social History     Substance and Sexual Activity   Alcohol Use Not Currently     Social History     Tobacco Use   Smoking Status Never    Passive exposure: Past   Smokeless Tobacco Never     Social History     Substance and Sexual Activity   Drug Use Not Currently       Family History:  Family History   Problem Relation Age of Onset    Uterine cancer Mother     Esophageal cancer Father     Colon cancer Maternal Grandmother        Physical Exam:     Vitals:   Blood Pressure: (!) 194/108 (05/09/24 0428)  Pulse: 100 (05/09/24 0428)  Temperature: 98.6 °F (37 °C) (05/09/24 0428)  Temp Source: Oral (05/09/24 0428)  Respirations: 20 (05/09/24 0428)  SpO2: 98 % (05/09/24 0429)    Constitutional:  Non-toxic appearance  Eyes:  EOMI, No scleral icterus   HENT:   oropharynx moist, external ears normal, external nose normal    Respiratory:  No respiratory distress, no wheezing   Cardiovascular:  tachycardia, no murmurs   GI:  Soft, nondistended, no guarding   :  No costovertebral angle tenderness   Musculoskeletal:  no tenderness, no deformities. Back- no tenderness  Integument:  no jaundice, no rash   Neurologic:  Alert &awake, communicative, CN 2-12 normal,  no focal deficits noted   Psychiatric:  Speech and behavior appropriate       Lab Results: I have personally reviewed pertinent reports.   and I have personally reviewed pertinent films in PACS    Results from last 7 days   Lab Units 05/09/24  0444   WBC Thousand/uL 4.75   HEMOGLOBIN g/dL 12.6   HEMATOCRIT % 36.0   PLATELETS Thousands/uL 324   SEGS PCT % 88*   LYMPHO PCT % 7*   MONO PCT % 5   EOS PCT % 0     Results from last 7 days   Lab Units 05/09/24  0444   POTASSIUM mmol/L 3.4*   CHLORIDE mmol/L 87*   CO2 mmol/L 26   BUN mg/dL 7   CREATININE mg/dL 0.53*   CALCIUM mg/dL 10.2   ALK PHOS U/L 81   ALT U/L 15   AST U/L 19             Imaging: I have personally reviewed pertinent reports.   and I have personally reviewed pertinent films in PACS    No results found.    Total time for visit, including counseling/coordination of care: 45 minutes. Greater than 50% of this total time spent on direct patient counseling and coorination of care.     Epic Records Reviewed as well as Records in Care Everywhere    ** Please Note: Dragon 360 Dictation voice to text software was used in the creation of this document. **

## 2024-05-09 NOTE — ED PROVIDER NOTES
"History  Chief Complaint   Patient presents with    Medical Problem     Pt states she has an allergy to latex, pt seen previously for same issue. Pt states mattress has latex particles ob it but hasn't gotten rid of it. Pt states swollen lips and tongue. No breathing issues notes at this time.      Patient is a 63-year-old female with history of anxiety, hypertension, hyperlipidemia, and GERD who presents for allergic reaction.  Patient states that she has an allergy to latex and that she accidentally burned a bed cover a few weeks ago in her dryer.  She says that the latex particles got into her tear duct system and that she is breathing it in causing \"bronchial inflammation\".  She says she is also been fighting a \"bronchial infection\" and that her family doctor prescribed her amlodipine which seems to have helped this.  She has been seen several times in this emergency department for similar complaints.  She currently is complaining of tongue swelling, difficulty breathing, chest pain, palpitations, nausea, and anxiety.  Patient is requesting EpiPen, methylprednisolone, Pepcid, and DuoNeb.        Prior to Admission Medications   Prescriptions Last Dose Informant Patient Reported? Taking?   [facility] Vitamin B-12 1000 MCG tablet   No No   Sig: TAKE 1 TABLET BY MOUTH EVERY DAY   EPINEPHrine (EPIPEN) 0.3 mg/0.3 mL SOAJ   No No   Sig: Inject 0.3 mL (0.3 mg total) into a muscle once for 1 dose   LORazepam (ATIVAN) 2 mg tablet   No No   Sig: Take 1 tablet (2 mg total) by mouth every 4 (four) hours as needed for anxiety   PARoxetine (PAXIL) 30 mg tablet   No No   Sig: TAKE 2 TABLETS (60 MG TOTAL) BY MOUTH IN THE MORNING   QUEtiapine (SEROquel XR) 400 mg 24 hr tablet  Self No No   Sig: Take 1 tablet (400 mg total) by mouth daily at bedtime   Patient not taking: Reported on 3/21/2024   amLODIPine (NORVASC) 5 mg tablet  Self No No   Sig: TAKE 1 TABLET (5 MG TOTAL) BY MOUTH DAILY.   benzonatate (TESSALON PERLES) 100 mg capsule  " Self No No   Sig: Take 1 capsule (100 mg total) by mouth 3 (three) times a day as needed for cough   celecoxib (CeleBREX) 200 mg capsule   No No   Sig: Take 1 capsule (200 mg total) by mouth daily   famotidine (PEPCID) 20 mg tablet   No No   Sig: Take 1 tablet (20 mg total) by mouth daily   ferrous sulfate 325 (65 Fe) mg tablet   No No   Sig: Take 1 tablet (325 mg total) by mouth daily with breakfast   folic acid (FOLVITE) 400 mcg tablet   No No   Sig: Take 1 tablet (400 mcg total) by mouth daily   levothyroxine 25 mcg tablet  Self No No   Sig: TAKE 1 TABLET BY MOUTH EVERY DAY   methylPREDNISolone 4 MG tablet therapy pack   No No   Sig: Use as directed on package   ondansetron (ZOFRAN) 4 mg tablet  Self No No   Sig: Take 1 tablet (4 mg total) by mouth every 6 (six) hours   pantoprazole (PROTONIX) 40 mg tablet  Self No No   Sig: TAKE 1 TABLET BY MOUTH TWICE A DAY   predniSONE 20 mg tablet  Self Yes No   Sig: Take by mouth daily   Patient not taking: Reported on 2023   sucralfate (CARAFATE) 1 g tablet   No No   Sig: TAKE 1 TABLET (1 G TOTAL) BY MOUTH 3 (THREE) TIMES A DAY WITH MEALS   topiramate (Topamax) 50 MG tablet  Self No No   Si/2 TAB at bedtime for 1 week, increase to 1/2 TAB in AM and PM for 1 week, increase to 1/2 TAB in AM and 1 TAB in PM for 1 week, and finish at 1 TAB in AM and PM   Patient not taking: Reported on 3/21/2024   triamcinolone (KENALOG) 0.1 % cream  Self No No   Sig: APPLY TOPICALLY 2 TIMES A DAY AS NEEDED FOR RASH.   ziprasidone (GEODON) 80 mg capsule  Self No No   Sig: TAKE 1 CAPSULE BY MOUTH EVERY DAY      Facility-Administered Medications: None       Past Medical History:   Diagnosis Date    Anemia     Anxiety     Arthritis     Back pain at L4-L5 level     Schreiber esophagus     Bulimia     Colon polyp     Disease of thyroid gland     hypothyroid    GERD (gastroesophageal reflux disease)     Hearing loss in left ear     History of transfusion     Hyperlipidemia     Hypertension      Hypothyroidism     Leukopenia     NMS (neuroleptic malignant syndrome) 01/03/2020    Pneumonia     RA (rheumatoid arthritis) (HCC)     in feet    Sciatica     Seizure (HCC)     due to medication mix up 8/2018 only one historically    Skin spots, red     lower right arm - poss from cat- no s/s infection    Synovial cyst of lumbar spine     Vertigo     Wears dentures     full set    Weight gain        Past Surgical History:   Procedure Laterality Date    BONE MARROW BIOPSY      BREAST SURGERY      enlargement with implants    CLOSED REDUCTION DISTAL FEMUR FRACTURE      COLONOSCOPY      COSMETIC SURGERY      DENTAL SURGERY      HIP ARTHROPLASTY Right 01/02/2020    Procedure: REVISION TOTAL HIP ARTHROPLASTY WITH REPAIR OF PARIPROSTHETIC FRACTURE - POSTERIOR APPROACH;  Surgeon: Dilan Pedersen MD;  Location: [facility];  Service: Orthopedics    OK AMPUTATION METATARSAL W/TOE SINGLE Left 04/07/2023    Procedure: AMPUTATION TOE 4th;  Surgeon: Conner Bartlett DPM;  Location:  MAIN OR;  Service: Podiatry    OK ARTHRP ACETBLR/PROX FEM PROSTC AGRFT/ALGRFT Right 12/11/2019    Procedure: ARTHROPLASTY HIP TOTAL ANTERIOR;  Surgeon: Dilan Pedersen MD;  Location: [facility];  Service: Orthopedics    OK ESOPHAGOGASTRODUODENOSCOPY TRANSORAL DIAGNOSTIC N/A 05/11/2021    Procedure: ESOPHAGOGASTRODUODENOSCOPY (EGD);  Surgeon: David Cedeño MD;  Location: [facility];  Service: General    OK LAPS RPR PARAESPHGL HRNA INCL FUNDPLSTY W/MESH N/A 05/11/2021    Procedure: REPAIR HERNIA PARAESOPHAGEAL  LAPAROSCOPIC;  Surgeon: David Cedeño MD;  Location: [facility];  Service: General    OK UNLISTED PROCEDURE ESOPHAGUS N/A 05/11/2021    Procedure: FUNDOPLICATION TRANSORAL INCISIONLESS;  Surgeon: Brad Cadet MD;  Location: [facility];  Service: Gastroenterology    RHINOPLASTY      SINUS SURGERY      TOE AMPUTATION Left     2023 4th toe    TRANSORAL INCISIONLESS FUNDOPLICATION (TIF)  05/11/2021       Family History   Problem Relation Age of  Onset    Uterine cancer Mother     Esophageal cancer Father     Colon cancer Maternal Grandmother      I have reviewed and agree with the history as documented.    E-Cigarette/Vaping    E-Cigarette Use Never User      E-Cigarette/Vaping Substances    Nicotine No     THC No     CBD No     Flavoring No     Other No     Unknown No      Social History     Tobacco Use    Smoking status: Never     Passive exposure: Past    Smokeless tobacco: Never   Vaping Use    Vaping status: Never Used   Substance Use Topics    Alcohol use: Not Currently    Drug use: Not Currently        Review of Systems   Constitutional:  Negative for chills and fever.   HENT:  Negative for congestion, rhinorrhea and sore throat.    Eyes:  Negative for pain and visual disturbance.   Respiratory:  Positive for shortness of breath. Negative for cough.    Cardiovascular:  Positive for chest pain and palpitations.   Gastrointestinal:  Negative for abdominal pain, constipation, diarrhea, nausea and vomiting.   Genitourinary:  Negative for dysuria and hematuria.   Musculoskeletal:  Negative for back pain and neck pain.   Skin:  Negative for color change and rash.   Neurological:  Negative for weakness and numbness.   All other systems reviewed and are negative.      Physical Exam  ED Triage Vitals [05/09/24 0428]   Temperature Pulse Respirations Blood Pressure SpO2   98.6 °F (37 °C) 100 20 (!) 194/108 98 %      Temp Source Heart Rate Source Patient Position - Orthostatic VS BP Location FiO2 (%)   Oral Monitor Lying Left arm --      Pain Score       --             Orthostatic Vital Signs  Vitals:    05/09/24 0428   BP: (!) 194/108   Pulse: 100   Patient Position - Orthostatic VS: Lying       Physical Exam  Vitals and nursing note reviewed.   Constitutional:       General: She is not in acute distress.     Appearance: Normal appearance. She is well-developed. She is obese. She is not ill-appearing, toxic-appearing or diaphoretic.   HENT:      Head:  Normocephalic and atraumatic.      Right Ear: External ear normal.      Left Ear: External ear normal.      Nose: Nose normal. No congestion or rhinorrhea.      Mouth/Throat:      Mouth: Mucous membranes are moist.      Pharynx: Oropharynx is clear. No oropharyngeal exudate or posterior oropharyngeal erythema.      Comments: Tongue does not appear swollen but patient is actively sticking it out of her mouth.  No oropharyngeal swelling or erythema.  Uvula midline.  Eyes:      General: No scleral icterus.        Right eye: No discharge.         Left eye: No discharge.      Extraocular Movements: Extraocular movements intact.      Conjunctiva/sclera: Conjunctivae normal.      Pupils: Pupils are equal, round, and reactive to light.   Cardiovascular:      Rate and Rhythm: Normal rate and regular rhythm.      Pulses: Normal pulses.      Heart sounds: Normal heart sounds. No murmur heard.     No friction rub. No gallop.   Pulmonary:      Effort: Pulmonary effort is normal. No respiratory distress.      Breath sounds: No stridor. Rhonchi present. No wheezing or rales.   Abdominal:      General: Abdomen is flat. Bowel sounds are normal. There is no distension.      Palpations: Abdomen is soft.      Tenderness: There is no abdominal tenderness. There is no guarding.   Musculoskeletal:         General: No tenderness.      Cervical back: Neck supple.      Right lower leg: No edema.      Left lower leg: No edema.   Skin:     General: Skin is warm and dry.      Capillary Refill: Capillary refill takes less than 2 seconds.      Coloration: Skin is not jaundiced or pale.   Neurological:      General: No focal deficit present.      Mental Status: She is alert and oriented to person, place, and time.   Psychiatric:         Behavior: Behavior normal.      Comments: Anxious appearing         ED Medications  Medications   amLODIPine (NORVASC) tablet 5 mg (has no administration in time range)   benzonatate (TESSALON PERLES) capsule 100  mg (has no administration in time range)   famotidine (PEPCID) tablet 20 mg (has no administration in time range)   folic acid (FOLVITE) tablet 400 mcg (has no administration in time range)   levothyroxine tablet 25 mcg (has no administration in time range)   LORazepam (ATIVAN) tablet 2 mg (has no administration in time range)   pantoprazole (PROTONIX) EC tablet 40 mg (has no administration in time range)   ziprasidone (GEODON) capsule 80 mg (has no administration in time range)   acetaminophen (TYLENOL) tablet 650 mg (has no administration in time range)   magnesium sulfate IVPB (premix) SOLN 1 g (has no administration in time range)   diphenhydrAMINE (FOR EMS ONLY) (BENADRYL) injection 50 mg (0 mg Does not apply Given to EMS 5/9/24 0429)   famotidine (PEPCID) tablet 20 mg (20 mg Oral Given 5/9/24 0444)   ondansetron (ZOFRAN) injection 4 mg (4 mg Intravenous Given 5/9/24 0444)   albuterol inhalation solution 5 mg (5 mg Nebulization Given 5/9/24 0444)   ipratropium (ATROVENT) 0.02 % inhalation solution 0.5 mg (0.5 mg Nebulization Given 5/9/24 0444)   ceftriaxone (ROCEPHIN) 2 g/50 mL in dextrose IVPB (0 mg Intravenous Stopped 5/9/24 0619)       Diagnostic Studies  Results Reviewed       Procedure Component Value Units Date/Time    UA w Reflex to Microscopic w Reflex to Culture [[id number]]  (Abnormal) Collected: 05/09/24 0545    Lab Status: Final result Specimen: Urine, Clean Catch Updated: 05/09/24 0617     Color, UA Colorless     Clarity, UA Clear     Specific Gravity, UA 1.002     pH, UA 6.5     Leukocytes, UA Negative     Nitrite, UA Negative     Protein, UA Negative mg/dl      Glucose, UA Negative mg/dl      Ketones, UA Negative mg/dl      Urobilinogen, UA <2.0 mg/dl      Bilirubin, UA Negative     Occult Blood, UA Negative    Procalcitonin [[id number]]     Lab Status: No result Specimen: Blood     Sodium, urine, random [[id number]] Collected: 05/09/24 0545    Lab Status: In process Specimen: Urine, Clean Catch  Updated: 05/09/24 0604    Creatinine, urine, random [[id number]] Collected: 05/09/24 0545    Lab Status: In process Specimen: Urine, Clean Catch Updated: 05/09/24 0604    B-Type Natriuretic Peptide(BNP) [[id number]]  (Normal) Collected: 05/09/24 0444    Lab Status: Final result Specimen: Blood from Arm, Left Updated: 05/09/24 0558     BNP 8 pg/mL     TSH [[id number]]  (Normal) Collected: 05/09/24 0444    Lab Status: Final result Specimen: Blood from Arm, Left Updated: 05/09/24 0550     TSH 3RD GENERATON 0.492 uIU/mL     HS Troponin 0hr (reflex protocol) [[id number]]  (Normal) Collected: 05/09/24 0444    Lab Status: Final result Specimen: Blood from Arm, Left Updated: 05/09/24 0519     hs TnI 0hr 3 ng/L     Magnesium [[id number]]     Lab Status: No result Specimen: Blood     Phosphorus [[id number]]     Lab Status: No result Specimen: Blood     Comprehensive metabolic panel [[id number]]  (Abnormal) Collected: 05/09/24 0444    Lab Status: Final result Specimen: Blood from Arm, Left Updated: 05/09/24 0516     Sodium 123 mmol/L      Potassium 3.4 mmol/L      Chloride 87 mmol/L      CO2 26 mmol/L      ANION GAP 10 mmol/L      BUN 7 mg/dL      Creatinine 0.53 mg/dL      Glucose 104 mg/dL      Calcium 10.2 mg/dL      AST 19 U/L      ALT 15 U/L      Alkaline Phosphatase 81 U/L      Total Protein 7.1 g/dL      Albumin 4.3 g/dL      Total Bilirubin 0.41 mg/dL      eGFR 101 ml/min/1.73sq m     Narrative:      [employer] guidelines for Chronic Kidney Disease (CKD):     Stage 1 with normal or high GFR (GFR > 90 mL/min/1.73 square meters)    Stage 2 Mild CKD (GFR = 60-89 mL/min/1.73 square meters)    Stage 3A Moderate CKD (GFR = 45-59 mL/min/1.73 square meters)    Stage 3B Moderate CKD (GFR = 30-44 mL/min/1.73 square meters)    Stage 4 Severe CKD (GFR = 15-29 mL/min/1.73 square meters)    Stage 5 End Stage CKD (GFR <15 mL/min/1.73 square meters)  Note: GFR calculation is accurate only with a steady state  creatinine    CBC and differential [[id number]]  (Abnormal) Collected: 05/09/24 0444    Lab Status: Final result Specimen: Blood from Arm, Left Updated: 05/09/24 0503     WBC 4.75 Thousand/uL      RBC 4.55 Million/uL      Hemoglobin 12.6 g/dL      Hematocrit 36.0 %      MCV 79 fL      MCH 27.7 pg      MCHC 35.0 g/dL      RDW 13.6 %      MPV 8.7 fL      Platelets 324 Thousands/uL      nRBC 0 /100 WBCs      Segmented % 88 %      Immature Grans % 0 %      Lymphocytes % 7 %      Monocytes % 5 %      Eosinophils Relative 0 %      Basophils Relative 0 %      Absolute Neutrophils 4.16 Thousands/µL      Absolute Immature Grans 0.01 Thousand/uL      Absolute Lymphocytes 0.33 Thousands/µL      Absolute Monocytes 0.22 Thousand/µL      Eosinophils Absolute 0.01 Thousand/µL      Basophils Absolute 0.02 Thousands/µL                    XR chest 2 views   ED Interpretation by Domingo Sullivan MD (05/09 0544)   Abnormal   Right-sided opacities concerning for possible pneumonia            Procedures  Procedures      ED Course  ED Course as of 05/09/24 0630   Thu May 09, 2024   0448 This EKG interpreted by me. Rate 98, rhythm normal sinus, normal axis, intervals normal, no acute ST or T wave changes     0524 Sodium(!): 123  Will order additional labs and replete with NS                                       Medical Decision Making  Patient is a 63-year-old female with history of anxiety, hypertension, hyperlipidemia, and GERD who presents for allergic reaction.    DDx includes but not limited to ACS, pneumonia, arrhythmia.  I doubt anaphylaxis given does not appear to have angioedema on exam nor does she have any rashes.  She does have rhonchorous breath sounds the right lower lobes concerning for possible pneumonia.  And she does report shortness of breath.  Will plan to obtain cardiac workup, chest x-ray.  Patient requesting IV methylprednisolone, epi injection, will defer for now.    Patient's labs are concerning for hyponatremia  of 123, patient was started on a normal saline bolus.  Her chest x-ray on my interpretation shows concerns for right-sided opacities concerning for possible pneumonia.  Patient was started on IV Rocephin.  Will add on additional labs to assess for hyponatremia.  I discussed the case with the hospitalist who agrees to admit the patient for pneumonia and hyponatremia.    Amount and/or Complexity of Data Reviewed  Labs: ordered. Decision-making details documented in ED Course.  Radiology: ordered and independent interpretation performed.    Risk  Prescription drug management.  Decision regarding hospitalization.          Disposition  Final diagnoses:   Pneumonia   Hyponatremia   HTN (hypertension)   HLD (hyperlipidemia)     Time reflects when diagnosis was documented in both MDM as applicable and the Disposition within this note       Time User Action Codes Description Comment    5/9/2024  5:52 AM Domingo Sullivan Add [J18.9] Pneumonia     5/9/2024  5:52 AM KrivmarshalliaDomingo Add [E87.1] Hyponatremia     5/9/2024  5:52 AM KrivcheniaAbimbolah Add [I10] HTN (hypertension)     5/9/2024  5:52 AM KrivcheniaDomingo Add [E78.5] HLD (hyperlipidemia)           ED Disposition       ED Disposition   Admit    Condition   Stable    Date/Time   Thu May 9, 2024  5:52 AM    Comment   Case was discussed with Dr. Navas and the patient's admission status was agreed to be Admission Status: inpatient status to the service of Dr. Navas .               Follow-up Information    None         Patient's Medications   Discharge Prescriptions    No medications on file     No discharge procedures on file.    PDMP Review         Value Time User    PDMP Reviewed  Yes 4/23/2024  9:28 AM Raheem Cain MD             ED Provider  Attending physically available and evaluated Irene Plascencia. I managed the patient along with the ED Attending.    Electronically Signed by           Domingo Sullivan MD  05/09/24 [id number]

## 2024-05-09 NOTE — ED ATTENDING ATTESTATION
5/9/2024  I, Oscar Kim MD, saw and evaluated the patient. I have discussed the patient with the resident/non-physician practitioner and agree with the resident's/non-physician practitioner's findings, Plan of Care, and MDM as documented in the resident's/non-physician practitioner's note, except where noted. All available labs and Radiology studies were reviewed.  I was present for key portions of any procedure(s) performed by the resident/non-physician practitioner and I was immediately available to provide assistance.       At this point I agree with the current assessment done in the Emergency Department.  I have conducted an independent evaluation of this patient a history and physical is as follows:    63-year-old female with a history of anxiety presents to the emergency department via EMS for concern of possible allergic reaction.  Patient states she has a latex allergy and is concerned that she has been exposed to latex particles.  She states she believes her tongue and lips are swollen.  No rashes.  She is requesting an EpiPen, steroids, Pepcid, and DuoNeb.    On exam, patient anxious appearing, but in no acute distress, head is normocephalic atraumatic, pupils equal round and reactive, neck is supple without meningismus signs, heart is regular rate and rhythm with intact distal pulses, no increased work of breathing or respiratory distress, or stridor, does have rhonchi present.  Abdomen is soft, nontender nondistended without rebound or guarding.  No rash.  No signs of angioedema.    Differential diagnosis includes but is not limited to anxiety, allergic reaction, metabolic derangement, pneumonia, viral illness.  Will check cardiac workup and reassess.  No signs of anaphylaxis or acute angioedema.  Labs remarkable for hyponatremia.  Chest x-ray per my interpretation appears to demonstrate a right-sided pneumonia.  Patient started on antibiotics.  Case discussed with medical service who will admit  for further management and evaluation.    ED Course         Critical Care Time  Procedures

## 2024-05-09 NOTE — ASSESSMENT & PLAN NOTE
Patient's PTA psychiatric meds include Ativan 2 mg by mouth every 4 hours as needed for anxiety, Paxil 60 mg daily, Seroquel 400 mg by mouth daily at bedtime (patient reports that she stopped taking this and is no longer on this), topiramate (patient reports that she stopped taking this and is no longer on this), as well as ziprasidone 80 mg daily  Will put on patient's PTA Ativan as well as her ziprasidone  We will hold the medications that she says she is no longer taking  We will additionally hold her Paxil overnight as this could contribute to SIADH

## 2024-05-10 VITALS
DIASTOLIC BLOOD PRESSURE: 81 MMHG | RESPIRATION RATE: 16 BRPM | SYSTOLIC BLOOD PRESSURE: 122 MMHG | TEMPERATURE: 97.8 F | HEART RATE: 84 BPM | OXYGEN SATURATION: 90 %

## 2024-05-10 LAB
ALBUMIN SERPL BCP-MCNC: 4.1 G/DL (ref 3.5–5)
ALP SERPL-CCNC: 76 U/L (ref 34–104)
ALT SERPL W P-5'-P-CCNC: 17 U/L (ref 7–52)
ANION GAP SERPL CALCULATED.3IONS-SCNC: 7 MMOL/L (ref 4–13)
ANION GAP SERPL CALCULATED.3IONS-SCNC: 9 MMOL/L (ref 4–13)
ANION GAP SERPL CALCULATED.3IONS-SCNC: 9 MMOL/L (ref 4–13)
APTT PPP: 26 SECONDS (ref 23–37)
AST SERPL W P-5'-P-CCNC: 18 U/L (ref 13–39)
BASOPHILS # BLD AUTO: 0.05 THOUSANDS/ÂΜL (ref 0–0.1)
BASOPHILS NFR BLD AUTO: 1 % (ref 0–1)
BILIRUB SERPL-MCNC: 0.32 MG/DL (ref 0.2–1)
BUN SERPL-MCNC: 10 MG/DL (ref 5–25)
BUN SERPL-MCNC: 10 MG/DL (ref 5–25)
BUN SERPL-MCNC: 11 MG/DL (ref 5–25)
CALCIUM SERPL-MCNC: 8.9 MG/DL (ref 8.4–10.2)
CALCIUM SERPL-MCNC: 9.1 MG/DL (ref 8.4–10.2)
CALCIUM SERPL-MCNC: 9.2 MG/DL (ref 8.4–10.2)
CHLORIDE SERPL-SCNC: 92 MMOL/L (ref 96–108)
CHLORIDE SERPL-SCNC: 92 MMOL/L (ref 96–108)
CHLORIDE SERPL-SCNC: 93 MMOL/L (ref 96–108)
CO2 SERPL-SCNC: 26 MMOL/L (ref 21–32)
CO2 SERPL-SCNC: 29 MMOL/L (ref 21–32)
CO2 SERPL-SCNC: 30 MMOL/L (ref 21–32)
CREAT SERPL-MCNC: 0.61 MG/DL (ref 0.6–1.3)
CREAT SERPL-MCNC: 0.63 MG/DL (ref 0.6–1.3)
CREAT SERPL-MCNC: 0.68 MG/DL (ref 0.6–1.3)
EOSINOPHIL # BLD AUTO: 0.15 THOUSAND/ÂΜL (ref 0–0.61)
EOSINOPHIL NFR BLD AUTO: 3 % (ref 0–6)
ERYTHROCYTE [DISTWIDTH] IN BLOOD BY AUTOMATED COUNT: 13.9 % (ref 11.6–15.1)
GFR SERPL CREATININE-BSD FRML MDRD: 93 ML/MIN/1.73SQ M
GFR SERPL CREATININE-BSD FRML MDRD: 95 ML/MIN/1.73SQ M
GFR SERPL CREATININE-BSD FRML MDRD: 96 ML/MIN/1.73SQ M
GLUCOSE SERPL-MCNC: 103 MG/DL (ref 65–140)
GLUCOSE SERPL-MCNC: 84 MG/DL (ref 65–140)
GLUCOSE SERPL-MCNC: 97 MG/DL (ref 65–140)
HCT VFR BLD AUTO: 36.2 % (ref 34.8–46.1)
HGB BLD-MCNC: 12.1 G/DL (ref 11.5–15.4)
IMM GRANULOCYTES # BLD AUTO: 0.02 THOUSAND/UL (ref 0–0.2)
IMM GRANULOCYTES NFR BLD AUTO: 0 % (ref 0–2)
INR PPP: 1 (ref 0.84–1.19)
LYMPHOCYTES # BLD AUTO: 0.87 THOUSANDS/ÂΜL (ref 0.6–4.47)
LYMPHOCYTES NFR BLD AUTO: 19 % (ref 14–44)
MAGNESIUM SERPL-MCNC: 1.9 MG/DL (ref 1.9–2.7)
MCH RBC QN AUTO: 27.7 PG (ref 26.8–34.3)
MCHC RBC AUTO-ENTMCNC: 33.4 G/DL (ref 31.4–37.4)
MCV RBC AUTO: 83 FL (ref 82–98)
MONOCYTES # BLD AUTO: 0.41 THOUSAND/ÂΜL (ref 0.17–1.22)
MONOCYTES NFR BLD AUTO: 9 % (ref 4–12)
NEUTROPHILS # BLD AUTO: 3.19 THOUSANDS/ÂΜL (ref 1.85–7.62)
NEUTS SEG NFR BLD AUTO: 68 % (ref 43–75)
NRBC BLD AUTO-RTO: 0 /100 WBCS
PLATELET # BLD AUTO: 301 THOUSANDS/UL (ref 149–390)
PMV BLD AUTO: 8.6 FL (ref 8.9–12.7)
POTASSIUM SERPL-SCNC: 3.4 MMOL/L (ref 3.5–5.3)
POTASSIUM SERPL-SCNC: 3.5 MMOL/L (ref 3.5–5.3)
POTASSIUM SERPL-SCNC: 3.5 MMOL/L (ref 3.5–5.3)
PROCALCITONIN SERPL-MCNC: <0.05 NG/ML
PROT SERPL-MCNC: 6.9 G/DL (ref 6.4–8.4)
PROTHROMBIN TIME: 13.1 SECONDS (ref 11.6–14.5)
RBC # BLD AUTO: 4.37 MILLION/UL (ref 3.81–5.12)
SODIUM SERPL-SCNC: 128 MMOL/L (ref 135–147)
SODIUM SERPL-SCNC: 129 MMOL/L (ref 135–147)
SODIUM SERPL-SCNC: 130 MMOL/L (ref 135–147)
WBC # BLD AUTO: 4.69 THOUSAND/UL (ref 4.31–10.16)

## 2024-05-10 PROCEDURE — 97166 OT EVAL MOD COMPLEX 45 MIN: CPT

## 2024-05-10 PROCEDURE — 99232 SBSQ HOSP IP/OBS MODERATE 35: CPT | Performed by: INTERNAL MEDICINE

## 2024-05-10 PROCEDURE — 85610 PROTHROMBIN TIME: CPT | Performed by: INTERNAL MEDICINE

## 2024-05-10 PROCEDURE — C9113 INJ PANTOPRAZOLE SODIUM, VIA: HCPCS | Performed by: INTERNAL MEDICINE

## 2024-05-10 PROCEDURE — 97162 PT EVAL MOD COMPLEX 30 MIN: CPT

## 2024-05-10 PROCEDURE — 85730 THROMBOPLASTIN TIME PARTIAL: CPT | Performed by: INTERNAL MEDICINE

## 2024-05-10 PROCEDURE — 99239 HOSP IP/OBS DSCHRG MGMT >30: CPT | Performed by: INTERNAL MEDICINE

## 2024-05-10 PROCEDURE — 80053 COMPREHEN METABOLIC PANEL: CPT | Performed by: INTERNAL MEDICINE

## 2024-05-10 PROCEDURE — 84145 PROCALCITONIN (PCT): CPT | Performed by: INTERNAL MEDICINE

## 2024-05-10 PROCEDURE — 80048 BASIC METABOLIC PNL TOTAL CA: CPT | Performed by: INTERNAL MEDICINE

## 2024-05-10 PROCEDURE — 83735 ASSAY OF MAGNESIUM: CPT | Performed by: INTERNAL MEDICINE

## 2024-05-10 PROCEDURE — 85025 COMPLETE CBC W/AUTO DIFF WBC: CPT | Performed by: INTERNAL MEDICINE

## 2024-05-10 RX ORDER — QUETIAPINE 200 MG/1
400 TABLET, FILM COATED, EXTENDED RELEASE ORAL
Status: DISCONTINUED | OUTPATIENT
Start: 2024-05-10 | End: 2024-05-10 | Stop reason: HOSPADM

## 2024-05-10 RX ORDER — ONDANSETRON 2 MG/ML
4 INJECTION INTRAMUSCULAR; INTRAVENOUS EVERY 8 HOURS PRN
Status: DISCONTINUED | OUTPATIENT
Start: 2024-05-10 | End: 2024-05-10 | Stop reason: HOSPADM

## 2024-05-10 RX ORDER — SODIUM CHLORIDE 1 G/1
2 TABLET ORAL 2 TIMES DAILY
Qty: 120 TABLET | Refills: 0 | Status: SHIPPED | OUTPATIENT
Start: 2024-05-10 | End: 2024-06-09

## 2024-05-10 RX ORDER — ZIPRASIDONE HYDROCHLORIDE 40 MG/1
80 CAPSULE ORAL DAILY
Status: DISCONTINUED | OUTPATIENT
Start: 2024-05-10 | End: 2024-05-10 | Stop reason: SDUPTHER

## 2024-05-10 RX ORDER — PAROXETINE HYDROCHLORIDE 20 MG/1
60 TABLET, FILM COATED ORAL DAILY
Status: DISCONTINUED | OUTPATIENT
Start: 2024-05-10 | End: 2024-05-10 | Stop reason: HOSPADM

## 2024-05-10 RX ORDER — PANTOPRAZOLE SODIUM 40 MG/10ML
40 INJECTION, POWDER, LYOPHILIZED, FOR SOLUTION INTRAVENOUS ONCE
Status: COMPLETED | OUTPATIENT
Start: 2024-05-10 | End: 2024-05-10

## 2024-05-10 RX ORDER — MAGNESIUM HYDROXIDE/ALUMINUM HYDROXICE/SIMETHICONE 120; 1200; 1200 MG/30ML; MG/30ML; MG/30ML
30 SUSPENSION ORAL EVERY 4 HOURS PRN
Status: DISCONTINUED | OUTPATIENT
Start: 2024-05-10 | End: 2024-05-10 | Stop reason: HOSPADM

## 2024-05-10 RX ORDER — SODIUM CHLORIDE 1 G/1
2 TABLET ORAL 2 TIMES DAILY
Status: DISCONTINUED | OUTPATIENT
Start: 2024-05-10 | End: 2024-05-10 | Stop reason: HOSPADM

## 2024-05-10 RX ADMIN — BENZONATATE 100 MG: 100 CAPSULE ORAL at 10:16

## 2024-05-10 RX ADMIN — CALCIUM CARBONATE (ANTACID) CHEW TAB 500 MG 500 MG: 500 CHEW TAB at 10:51

## 2024-05-10 RX ADMIN — LEVOTHYROXINE SODIUM 25 MCG: 25 TABLET ORAL at 05:01

## 2024-05-10 RX ADMIN — ZIPRASIDONE HYDROCHLORIDE 80 MG: 40 CAPSULE ORAL at 10:16

## 2024-05-10 RX ADMIN — ONDANSETRON 4 MG: 2 INJECTION INTRAMUSCULAR; INTRAVENOUS at 11:11

## 2024-05-10 RX ADMIN — SUCRALFATE 1 G: 1 TABLET ORAL at 15:42

## 2024-05-10 RX ADMIN — LORAZEPAM 2 MG: 1 TABLET ORAL at 10:37

## 2024-05-10 RX ADMIN — PANTOPRAZOLE SODIUM 40 MG: 40 TABLET, DELAYED RELEASE ORAL at 05:01

## 2024-05-10 RX ADMIN — FOLIC ACID TAB 400 MCG 400 MCG: 400 TAB at 10:16

## 2024-05-10 RX ADMIN — PANTOPRAZOLE SODIUM 40 MG: 40 INJECTION, POWDER, FOR SOLUTION INTRAVENOUS at 11:35

## 2024-05-10 RX ADMIN — SUCRALFATE 1 G: 1 TABLET ORAL at 10:16

## 2024-05-10 RX ADMIN — PAROXETINE HYDROCHLORIDE 60 MG: 20 TABLET, FILM COATED ORAL at 10:37

## 2024-05-10 RX ADMIN — SODIUM CHLORIDE 2 G: 1 TABLET ORAL at 15:42

## 2024-05-10 RX ADMIN — FAMOTIDINE 20 MG: 20 TABLET, FILM COATED ORAL at 10:16

## 2024-05-10 RX ADMIN — BENZONATATE 100 MG: 100 CAPSULE ORAL at 15:42

## 2024-05-10 RX ADMIN — AMLODIPINE BESYLATE 5 MG: 5 TABLET ORAL at 10:16
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
## 2024-05-10 NOTE — ASSESSMENT & PLAN NOTE
Reports nonproductive cough  Denies constitutional symptoms of fever  Procalcitonin levels unremarkable  She was monitored off of antibiotics  Supportive cares

## 2024-05-10 NOTE — UTILIZATION REVIEW
Initial Clinical Review    Admission: Date/Time/Statement:   Admission Orders (From admission, onward)       Ordered        05/09/24 0553  INPATIENT ADMISSION  Once                          Orders Placed This Encounter   Procedures    INPATIENT ADMISSION     Standing Status:   Standing     Number of Occurrences:   1     Order Specific Question:   Level of Care     Answer:   Med Surg [16]     Order Specific Question:   Estimated length of stay     Answer:   More than 2 Midnights     Order Specific Question:   Certification     Answer:   I certify that inpatient services are medically necessary for this patient for a duration of greater than two midnights. See H&P and MD Progress Notes for additional information about the patient's course of treatment.     ED Arrival Information       Expected   -    Arrival   5/9/2024 04:22    Acuity   Less Urgent              Means of arrival   Ambulance    Escorted by   [facility]    Service   Hospitalist    Admission type   Emergency              Arrival complaint   Allergic Reaction             Chief Complaint   Patient presents with    Medical Problem     Pt states she has an allergy to latex, pt seen previously for same issue. Pt states mattress has latex particles ob it but hasn't gotten rid of it. Pt states swollen lips and tongue. No breathing issues notes at this time.        Initial Presentation: 63 y.o. female who presented by EMS to [facility] ED. Inpatient admission for evaluation and treatment of hyponatremia. PMHx: anemia, anxiety, arthritis, GERD, HLD, HTN, hypothyroidism, neuroleptic malignant syndrome, seizures, psychiatric disorder, vertigo. Presented w/ cough. Concern for developing PNA on imaging. Na 123. On exam, tachycardic. Plan: check urine sodium and osmolality, TSH; fluid restriction, q6h BMP, Trend labs, replete electrolytes as needed; hold NSAIDs, IV ABX, check procal, hold Paxil, continue PTA levothyroxine.  "Nephrology consulted.    Anticipated Length of Stay/Certification Statement: Patient will be admitted on an Inpatient basis with an anticipated length of stay of  > 2 midnights.    Nephrology: Pt reports drinking \"a lot\" further quantified as at least 2 L of diet coke and 2 20 ounce bottles of water. Reports chest discomfort, SOB, cough. 1.2 L fluid restriction, q6h BMP, check AM cortisol level & phos/mag, continue amlodipine, avoid rapid correction of sodium.       Date: 05/10/24   Day 2: This is likely multifactorial with SIADH polydipsia contributing.  She was placed on fluid restriction and seen in consultation with nephrology.  Sodium levels are normalizing to 130.  She is cleared for discharge today with salt tablets 2 g twice daily and fluid restriction.  Nephrology plans for outpatient BMP check and follow-up noted     Chronic conditions remained stable no medication changes at discharge.  She reported an episode of nausea and vomiting earlier today however now she is tolerating oral fluids and is agreeable to discharge planning.  She is symptomatically improving hemodynamically stable and is deemed ready for discharge today.    ED Triage Vitals   Temperature Pulse Respirations Blood Pressure SpO2   05/09/24 0428 05/09/24 0428 05/09/24 0428 05/09/24 0428 05/09/24 0428   98.6 °F (37 °C) 100 20 (!) 194/108 98 %      Temp Source Heart Rate Source Patient Position - Orthostatic VS BP Location FiO2 (%)   05/09/24 0428 05/09/24 0428 05/09/24 0428 05/09/24 0428 --   Oral Monitor Lying Left arm       Pain Score       05/09/24 1007       No Pain          Wt Readings from Last 1 Encounters:   04/26/24 81.3 kg (179 lb 3.2 oz)     Additional Vital Signs:   Date/Time Temp Pulse Resp BP MAP (mmHg) SpO2 O2 Device   05/10/24 07:23:04 97.8 °F (36.6 °C) 84 16 122/81 95 92 % --   05/09/24 22:11:21 97.4 °F (36.3 °C) Abnormal  84 18 108/65 79 90 % --   05/09/24 2030 -- -- -- -- -- -- None (Room air)   05/09/24 2013 98.7 °F (37.1 " °C) 85 -- -- -- 93 % --   05/09/24 15:24:10 97 °F (36.1 °C) Abnormal  82 18 138/79 99 90 % --   05/09/24 1015 -- -- -- -- -- -- None (Room air)   05/09/24 10:12:22 97.6 °F (36.4 °C) 93 16 135/80 98 91 % --   05/09/24 0815 -- 96 20 117/71 88 95 % None (Room air)   05/09/24 0810 -- -- -- 117/71 -- -- --       Pertinent Labs/Diagnostic Test Results:   5/9 - EKG  Normal sinus rhythm  Cannot rule out Anterior infarct , age undetermined    XR chest 2 views   ED Interpretation by Domingo Sullivan MD (05/09 0544)   Abnormal   Right-sided opacities concerning for possible pneumonia      Final Result by Phyllis Ch MD (05/09 1616)      No acute consolidation or congestion            Workstation performed: [id number]               Results from last 7 days   Lab Units 05/10/24  0500 05/09/24  0444   WBC Thousand/uL 4.69 4.75   HEMOGLOBIN g/dL 12.1 12.6   HEMATOCRIT % 36.2 36.0   PLATELETS Thousands/uL 301 324   TOTAL NEUT ABS Thousands/µL 3.19 4.16         Results from last 7 days   Lab Units 05/10/24  0500 05/10/24  0013 05/09/24  1816 05/09/24  1212 05/09/24  0444   SODIUM mmol/L 130* 129* 129* 129* 123*   POTASSIUM mmol/L 3.5 3.4* 3.6 4.0 3.4*   CHLORIDE mmol/L 92* 92* 93* 94* 87*   CO2 mmol/L 29 30 29 28 26   ANION GAP mmol/L 9 7 7 7 10   BUN mg/dL 10 11 11 8 7   CREATININE mg/dL 0.63 0.68 0.64 0.59* 0.53*   EGFR ml/min/1.73sq m 95 93 95 97 101   CALCIUM mg/dL 8.9 9.1 10.0 9.9 10.2   MAGNESIUM mg/dL 1.9  --   --   --  1.6*   PHOSPHORUS mg/dL  --   --   --   --  3.4     Results from last 7 days   Lab Units 05/10/24  0500 05/09/24  0444   AST U/L 18 19   ALT U/L 17 15   ALK PHOS U/L 76 81   TOTAL PROTEIN g/dL 6.9 7.1   ALBUMIN g/dL 4.1 4.3   TOTAL BILIRUBIN mg/dL 0.32 0.41         Results from last 7 days   Lab Units 05/10/24  0500 05/10/24  0013 05/09/24  1816 05/09/24  1212 05/09/24  0444   GLUCOSE RANDOM mg/dL 84 97 116 102 104     Results from last 7 days   Lab Units 05/09/24  0444   HS TNI 0HR ng/L 3          Results from last 7 days   Lab Units 05/10/24  0500   PROTIME seconds 13.1   INR  1.00   PTT seconds 26     Results from last 7 days   Lab Units 05/09/24  0444   TSH 3RD GENERATON uIU/mL 0.492     Results from last 7 days   Lab Units 05/10/24  0500 05/09/24  0444   PROCALCITONIN ng/ml <0.05 <0.05     Results from last 7 days   Lab Units 05/09/24  0444   BNP pg/mL 8         Results from last 7 days   Lab Units 05/09/24  0545   OSMO UR mmol/KG 86*     Results from last 7 days   Lab Units 05/09/24  0545   CLARITY UA  Clear   COLOR UA  Colorless   SPEC GRAV UA  1.002*   PH UA  6.5   GLUCOSE UA mg/dl Negative   KETONES UA mg/dl Negative   BLOOD UA  Negative   PROTEIN UA mg/dl Negative   NITRITE UA  Negative   BILIRUBIN UA  Negative   UROBILINOGEN UA (BE) mg/dl <2.0   LEUKOCYTES UA  Negative   SODIUM UR  15   CREATININE UR mg/dL 15.7         ED Treatment:   Medication Administration from 05/09/2024 0422 to 05/09/2024 0957         Date/Time Order Dose Route Action     05/09/2024 0429 EDT diphenhydrAMINE (FOR EMS ONLY) (BENADRYL) injection 50 mg 0 mg Does not apply Given to EMS     05/09/2024 0444 EDT famotidine (PEPCID) tablet 20 mg 20 mg Oral Given     05/09/2024 0444 EDT ondansetron (ZOFRAN) injection 4 mg 4 mg Intravenous Given     05/09/2024 0444 EDT albuterol inhalation solution 5 mg 5 mg Nebulization Given     05/09/2024 0444 EDT ipratropium (ATROVENT) 0.02 % inhalation solution 0.5 mg 0.5 mg Nebulization Given     05/09/2024 0521 EDT sodium chloride 0.9 % bolus 1,000 mL 1,000 mL Intravenous New Bag     05/09/2024 0550 EDT ceftriaxone (ROCEPHIN) 2 g/50 mL in dextrose IVPB 2,000 mg Intravenous New Bag     05/09/2024 0810 EDT amLODIPine (NORVASC) tablet 5 mg 5 mg Oral Given     05/09/2024 0810 EDT famotidine (PEPCID) tablet 20 mg 20 mg Oral Given     05/09/2024 0811 EDT folic acid (FOLVITE) tablet 400 mcg 400 mcg Oral Given     05/09/2024 0811 EDT levothyroxine tablet 25 mcg 25 mcg Oral Given     05/09/2024 0814 EDT  pantoprazole (PROTONIX) EC tablet 40 mg 40 mg Oral Given     05/09/2024 0811 EDT ziprasidone (GEODON) capsule 80 mg 80 mg Oral Given     05/09/2024 0812 EDT magnesium sulfate IVPB (premix) SOLN 1 g 1 g Intravenous New Bag          Past Medical History:   Diagnosis Date    Anemia     Anxiety     Arthritis     Back pain at L4-L5 level     Schreiber esophagus     Bulimia     Colon polyp     Disease of thyroid gland     hypothyroid    GERD (gastroesophageal reflux disease)     Hearing loss in left ear     History of transfusion     Hyperlipidemia     Hypertension     Hypothyroidism     Leukopenia     NMS (neuroleptic malignant syndrome) 01/03/2020    Pneumonia     RA (rheumatoid arthritis) (HCC)     in feet    Sciatica     Seizure (HCC)     due to medication mix up 8/2018 only one historically    Skin spots, red     lower right arm - poss from cat- no s/s infection    Synovial cyst of lumbar spine     Vertigo     Wears dentures     full set    Weight gain      Present on Admission:   Hyponatremia   Schizoaffective disorder (HCC)   Acquired hypothyroidism      Admitting Diagnosis: Hyponatremia [E87.1]  Pneumonia [J18.9]  HTN (hypertension) [I10]  HLD (hyperlipidemia) [E78.5]  History of medical problems [Z87.898]  Age/Sex: 63 y.o. female  Admission Orders:  Regular Diet.  1500 mL fluid restriction.  Up & OOB as tolerated.  I&O. SCDs.    Scheduled Medications:  amLODIPine, 5 mg, Oral, Daily  benzonatate, 100 mg, Oral, TID  famotidine, 20 mg, Oral, Daily  folic acid, 400 mcg, Oral, Daily  levothyroxine, 25 mcg, Oral, Early Morning  pantoprazole, 40 mg, Oral, BID AC  PARoxetine, 60 mg, Oral, Daily  QUEtiapine, 400 mg, Oral, HS  sucralfate, 1 g, Oral, TID With Meals  ziprasidone, 80 mg, Oral, Daily    Continuous IV Infusions: none    PRN Meds:  acetaminophen, 650 mg, Oral, Q6H PRN  calcium carbonate, 500 mg, Oral, Daily PRN; 5/10 x1  LORazepam, 2 mg, Oral, Q4H PRN; 5/10 x1  ondansetron, 4 mg, Intravenous, Q8H  PRN    Discontinued Meds:  dextrose 5 % bolus 250 mL  Dose: 250 mL  Freq: Once Route: IV  Start: 05/09/24 1900 End: 05/10/24 0155   dextrose 5 % bolus 500 mL  Dose: 500 mL  Freq: Once Route: IV  Start: 05/09/24 1330 End: 05/09/24 2039     IP CONSULT TO NEPHROLOGY    Network Utilization Review Department  ATTENTION: Please call with any questions or concerns to [phone] and carefully listen to the prompts so that you are directed to the right person. All voicemails are confidential.   For Discharge needs, contact Care Management DC Support Team at [phone] opt. 2  Send all requests for admission clinical reviews, approved or denied determinations and any other requests to dedicated fax number below belonging to the campus where the patient is receiving treatment. List of dedicated fax numbers for the Facilities:  FACILITY NAME UR FAX NUMBER   ADMISSION DENIALS (Administrative/Medical Necessity) [phone]   DISCHARGE SUPPORT TEAM (NETWORK) [phone]   PARENT CHILD HEALTH (Maternity/NICU/Pediatrics) [phone]   [facility] [phone]   [facility] [phone]   [facility] [phone]   [facility] [phone]   [facility] [phone]   [facility] [phone]   [facility] [phone]   [facility] [phone]   [facility] [phone]   [facility] [phone]   [facility] [phone]   [facility] [phone]

## 2024-05-10 NOTE — PHYSICAL THERAPY NOTE
"                                                                                  PHYSICAL THERAPY EVALUATION          Patient Name: Irene Plascencia  Today's Date: 5/10/2024       05/10/24 0945   PT Last Visit   PT Visit Date 05/10/24   Note Type   Note type Evaluation   Pain Assessment   Pain Assessment Tool 0-10   Pain Score No Pain   Patient's Stated Pain Goal No pain   Hospital Pain Intervention(s) Repositioned;Ambulation/increased activity   Restrictions/Precautions   Weight Bearing Precautions Per Order No   Other Precautions Cognitive   Home Living   Type of Home House   Home Layout One level;Performs ADLs on one level;Able to live on main level with bedroom/bathroom;Stairs to enter with rails   Home Equipment Walker;Cane   Additional Comments 1 FAMILIA, lives alone   Prior Function   Level of [place] Independent with ADLs;Independent with functional mobility;Independent with IADLS   Lives With Alone   Receives Help From   (self)   IADLs Independent with driving;Independent with meal prep;Independent with medication management   Falls in the last 6 months 1 to 4  (pt reports 3 falls due to tripping)   Comments no AD at baseline   General   Additional Pertinent History latex allergy   Family/Caregiver Present No   Cognition   Overall Cognitive Status Impaired   Arousal/Participation Alert   Orientation Level Oriented X4   Memory Within functional limits   Following Commands Follows all commands and directions without difficulty   Subjective   Subjective \"I'm like an alcoholic, I need to drink 2 liters of diet coke a day\"   RUE Assessment   RUE Assessment WFL   LUE Assessment   LUE Assessment WFL   RLE Assessment   RLE Assessment WFL   Strength RLE   RLE Overall Strength 4/5   LLE Assessment   LLE Assessment WFL   Strength LLE   LLE Overall Strength 4/5   Bed Mobility   Supine to Sit 5  Supervision   Sit to Supine 5  Supervision   Transfers   Sit to Stand 5  Supervision   Stand to Sit 5  Supervision "   Ambulation/Elevation   Gait pattern WNL   Gait Assistance 5  Supervision   Assistive Device None   Distance 50 ft x2   Stair Management Assistance 5  Supervision   Stair Management Technique One rail L;Alternating pattern;Reciprocal   Number of Stairs 3   Balance   Static Sitting Good   Dynamic Sitting Fair +   Static Standing Fair +   Dynamic Standing Fair +   Ambulatory Fair -   Endurance Deficit   Endurance Deficit No   Activity Tolerance   Activity Tolerance Patient tolerated treatment well   Medical Staff Made Aware Bello DPT; Pj, OT; Mary, student OT; OT present for coeval due to medical complexity of pt   Nurse Made Aware pt appropriate to be seen and mobilized per nursing   Assessment   Prognosis Good   Problem List Decreased cognition   Assessment Pt is a 63 y.o. female seen for PT evaluation sp admission [facility] on [facility]. Pt presented with cough and a primary diagnosis of hyponatremia with additional problems of cough, schizoaffective disorder, and acquired hypothyroidism. Pt's co morbidities effecting treatment include: has a past medical history of Anemia, Anxiety, Arthritis, Back pain at L4-L5 level, Schreiber esophagus, Bulimia, Colon polyp, Disease of thyroid gland, GERD (gastroesophageal reflux disease), Hearing loss in left ear, History of transfusion, Hyperlipidemia, Hypertension, Hypothyroidism, Leukopenia, NMS (neuroleptic malignant syndrome), Pneumonia, RA (rheumatoid arthritis) (HCC), Sciatica, Seizure (HCC), Skin spots, red, Synovial cyst of lumbar spine, Vertigo, Wears dentures, and Weight gain. PT performed moderate complexity evaluation due to pt's cognitive status, continuous pulse ox monitoring, and ongoing medical management of primary diagnosis. Pt presented with decreased cognition. Pt appears to be functioning at baseline for all mobility. During PT eval, pt performed bed mobility, transfers, ambulation, and stairs all with supervision level of assist. Based on the information  above, pt would continue to benefit from skilled PT services during hosptial stay. At conclusion of PT eval, pt left seated in bedside chiar with phone, call bell, and all other needs within reach. Discharge recommendation when medically cleared is home with support (level IV).   Barriers to Discharge None   Goals   Patient Goals to feel better   Plan   PT Frequency   (DC IP PT)   Discharge Recommendation   Rehab Resource Intensity Level, PT No post-acute rehabilitation needs   Additional Comments DC IP PT   AM-PAC Basic Mobility Inpatient   Turning in Flat Bed Without Bedrails 4   Lying on Back to Sitting on Edge of Flat Bed Without Bedrails 4   Moving Bed to Chair 4   Standing Up From Chair Using Arms 4   Walk in Room 4   Climb 3-5 Stairs With Railing 4   Basic Mobility Inpatient Raw Score 24   Basic Mobility Standardized Score 57.68   [place] [facility] Highest Level Of Mobility   -HLM Goal 8: Walk 250 feet or more   JH-HLM Achieved 7: Walk 25 feet or more   Modified Leon Scale   Modified Leon Scale 1   End of Consult   Patient Position at End of Consult Bedside chair;All needs within reach     Chandni Lopez, SPT

## 2024-05-10 NOTE — OCCUPATIONAL THERAPY NOTE
Occupational Therapy Evaluation     Patient Name: Irene Plascencia  Today's Date: 5/10/2024  Problem List  Principal Problem:    Hyponatremia  Active Problems:    Acquired hypothyroidism    Schizoaffective disorder (HCC)    Cough    Past Medical History  Past Medical History:   Diagnosis Date    Anemia     Anxiety     Arthritis     Back pain at L4-L5 level     Schreiber esophagus     Bulimia     Colon polyp     Disease of thyroid gland     hypothyroid    GERD (gastroesophageal reflux disease)     Hearing loss in left ear     History of transfusion     Hyperlipidemia     Hypertension     Hypothyroidism     Leukopenia     NMS (neuroleptic malignant syndrome) 01/03/2020    Pneumonia     RA (rheumatoid arthritis) (HCC)     in feet    Sciatica     Seizure (HCC)     due to medication mix up 8/2018 only one historically    Skin spots, red     lower right arm - poss from cat- no s/s infection    Synovial cyst of lumbar spine     Vertigo     Wears dentures     full set    Weight gain      Past Surgical History  Past Surgical History:   Procedure Laterality Date    BONE MARROW BIOPSY      BREAST SURGERY      enlargement with implants    CLOSED REDUCTION DISTAL FEMUR FRACTURE      COLONOSCOPY      COSMETIC SURGERY      DENTAL SURGERY      HIP ARTHROPLASTY Right 01/02/2020    Procedure: REVISION TOTAL HIP ARTHROPLASTY WITH REPAIR OF PARIPROSTHETIC FRACTURE - POSTERIOR APPROACH;  Surgeon: Dilan Pedersen MD;  [facility];  Service: Orthopedics    NE AMPUTATION METATARSAL W/TOE SINGLE Left 04/07/2023    Procedure: AMPUTATION TOE 4th;  Surgeon: Conner Bartlett DPM;  Location:  MAIN OR;  Service: Podiatry    NE ARTHRP ACETBLR/PROX FEM PROSTC AGRFT/ALGRFT Right 12/11/2019    Procedure: ARTHROPLASTY HIP TOTAL ANTERIOR;  Surgeon: Dilan Pedersen MD;  Location:  MAIN OR;  Service: Orthopedics    NE ESOPHAGOGASTRODUODENOSCOPY TRANSORAL DIAGNOSTIC N/A 05/11/2021    Procedure: ESOPHAGOGASTRODUODENOSCOPY (EGD);   Surgeon: David Cedeño MD;  [facility];  Service: General    ME LAPS RPR PARAESPHGL HRNA INCL FUNDPLSTY W/MESH N/A 05/11/2021    Procedure: REPAIR HERNIA PARAESOPHAGEAL  LAPAROSCOPIC;  Surgeon: David Cedeño MD;  [facility];  Service: General    ME UNLISTED PROCEDURE ESOPHAGUS N/A 05/11/2021    Procedure: FUNDOPLICATION TRANSORAL INCISIONLESS;  Surgeon: Brad Cadet MD;  [facility];  Service: Gastroenterology    RHINOPLASTY      SINUS SURGERY      TOE AMPUTATION Left     2023 4th toe    TRANSORAL INCISIONLESS FUNDOPLICATION (TIF)  05/11/2021         05/10/24 0946   OT Last Visit   OT Visit Date 05/10/24   Note Type   Note type Evaluation   Pain Assessment   Pain Assessment Tool 0-10   Pain Score No Pain   Patient's Stated Pain Goal No pain   Hospital Pain Intervention(s) Repositioned;Ambulation/increased activity   Restrictions/Precautions   Weight Bearing Precautions Per Order No   Home Living   Type of Home House   Home Layout One level;Performs ADLs on one level;Able to live on main level with bedroom/bathroom;Stairs to enter with rails   Bathroom Shower/Tub Tub/shower unit   Bathroom Toilet Raised   Bathroom Equipment Grab bars in shower   Bathroom Accessibility Accessible   Home Equipment Walker;Cane  (No DME at baseline)   Additional Comments 1 FAMILIA   Prior Function   Level of [place] Independent with ADLs;Independent with functional mobility;Independent with IADLS   Lives With (S)  Alone   Receives Help From Other (Comment)  (Self)   IADLs Independent with driving;Independent with meal prep;Independent with medication management   Falls in the last 6 months 1 to 4  (pt reports 3 falls)   Vocational Retired   Lifestyle   Autonomy Independent with ADLS/IADLS   Reciprocal Relationships Lives alone   Service to Others Retired   ADL   Eating Assistance 7  Independent   Grooming Assistance 7  Independent   UB Bathing Assistance 7  Independent   LB Bathing Assistance 5   Supervision/Setup   UB Dressing Assistance 7  Independent   LB Dressing Assistance 5  Supervision/Setup   Toileting Assistance  5  Supervision/Setup   Functional Assistance 5  Supervision/Setup   Bed Mobility   Supine to Sit 5  Supervision   Sit to Supine Unable to assess   Transfers   Sit to Stand 5  Supervision   Stand to Sit 5  Supervision   Functional Mobility   Functional Mobility 5  Supervision   Balance   Static Sitting Good   Dynamic Sitting Fair +   Static Standing Fair +   Dynamic Standing Fair +   Ambulatory Fair -   Activity Tolerance   Activity Tolerance Patient tolerated treatment well   Medical Staff Made Aware Pj, OT; Bello DPT; Radhika, SPT   Nurse Made Aware NSG made aware before seen by OT   RUE Assessment   RUE Assessment WFL   LUE Assessment   LUE Assessment WFL   Cognition   Overall Cognitive Status Impaired   Arousal/Participation Responsive   Attention Attends with cues to redirect   Orientation Level Oriented X4   Memory Within functional limits   Following Commands Follows one step commands without difficulty   Comments Pt believed to be at her cog baseline with diagnosed Schizoaffective disorder. Pt's cog does not appear to be an acute cog injury, rather performing at her baseline. She required redirections but able to atttend to redirection and followed one step commands without difficulty. At end of consult all of the pt's questions were answered and education provided.   Assessment   Prognosis Good   Assessment Pt is a 63 year old female presented to Emergency Department on 5/09 s/p medical problem. Pt reports an allergic reaction to latex and states she has swollen lips and tongue. Pt diagnosed with Hyponatremia, cough, schizoaffective disorder (HCC), and acquired hypothyroidism. Pt with a pmh of Anemia, Anxiety, Arthritis, Back pain at L4-L5 level, Schreiber esophagus, Bulimia, Colon polyp, Disease of thyroid gland hypothyroid, GERD (gastroesophageal reflux disease), Hearing loss in  "left ear, History of transfusion, Hyperlipidemia, Hypertension, Hypothyroidism, Leukopenia, NMS (neuroleptic malignant syndrome), Pneumonia, RA (rheumatoid arthritis) (HCC) in feet, Sciatica, Seizure (HCC) due to medication mix up 8/2018 only one historically, Skin spots lower right arm - poss from cat- no s/s infection, red, Synovial cyst of lumbar spine, Vertigo, Wears dentures full set, Weight gain. Pt with active OT Orders and Out of Bed Orders. Pt resides in a one level house alone, 1 FAMILIA. Prior to admission pt was independent with ADLS/IADLS. Currently pt is I with UB ADLS and requires Supervision/Setup for LB ADLS.  Pt requires S for bed mobility; Pt requires S for transfers and functional mobility. Pt was educated on ADL retraining, functional transfer training, endurance training, DME, compensatory techniques, and energy conservation. The patient's raw score on the -PAC Daily Activity Inpatient Short Form is 21. A raw score of greater than or equal to 19 suggests the patient may benefit from discharge to home. Please refer to the recommendation of the Occupational Therapist for safe discharge planning. From an Occupational Therapy standpoint the pt's discharge plan is no post-acute rehabilitation needs. At this time, the pt is not in need of skilled acute care OT services and can be discharged home.   Goals   Patient Goals \"to feel better\"   Discharge Recommendation   Rehab Resource Intensity Level, OT No post-acute rehabilitation needs   AM-PAC Daily Activity Inpatient   Lower Body Dressing 3   Bathing 3   Toileting 3   Upper Body Dressing 4   Grooming 4   Eating 4   Daily Activity Raw Score 21   Daily Activity Standardized Score (Calc for Raw Score >=11) 44.27   AM-PAC Applied Cognition Inpatient   Following a Speech/Presentation 4   Understanding Ordinary Conversation 4   Taking Medications 4   Remembering Where Things Are Placed or Put Away 4   Remembering List of 4-5 Errands 4   Taking Care of " Complicated Tasks 4   Applied Cognition Raw Score 24   Applied Cognition Standardized Score 62.21   End of Consult   Education Provided Yes   Patient Position at End of Consult Bedside chair;All needs within reach   Nurse Communication Nurse aware of consult     CHAO Guaman

## 2024-05-10 NOTE — DISCHARGE SUMMARY
[facility]  Discharge- Irene Plascencia 1960, 63 y.o. 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  She is recommended salt tablets 2 g twice daily and fluid restriction.    Nephrology plans for outpatient BMP and follow-up noted    Cough  Assessment & Plan  Reports nonproductive cough  Denies constitutional symptoms of fever  Procalcitonin levels unremarkable  She was monitored off of antibiotics  Supportive cares    Schizoaffective disorder (HCC)  Assessment & Plan  Continue home regimen of Seroquel Paxil  Outpatient follow-up    Acquired hypothyroidism  Assessment & Plan  Continue levothyroxine          Discharge Summary - [facility] Internal Medicine    Patient Information: Irene Plascencia 63 y.o. female MRN: [id number]  Unit/Bed#: [facility] 620-01 Encounter: [id number]    Discharging Physician / Practitioner: Char Clayton MD  PCP: Raheem Cain MD  Admission Date: 5/9/2024  Discharge Date: 05/10/24    Disposition:     Home    Reason for Admission: Hyponatremia    Discharge Diagnoses:     Principal Problem:    Hyponatremia  Active Problems:    Acquired hypothyroidism    Schizoaffective disorder (HCC)    Cough  Resolved Problems:    * No resolved hospital problems. *      Consultations During Hospital Stay:  Nephrology    Procedures Performed:     CT chest no active lung disease      Hospital Course:     Irene Plascencia is a 63 y.o. female patient who originally presented to the hospital on 5/9/2024 due to hyponatremia.  Patient presented with cough nonproductive sputum.  Denied constitutional symptoms.  Though she  received empirical antibiotics in the ED, she had no clinical features suggestive of pneumonia hence was monitored off antibiotics.  She was noted in the ED to have sodium of 123.  This is likely multifactorial with SIADH polydipsia contributing.  She was placed on fluid restriction and seen in consultation with nephrology.  Sodium levels are normalizing to 130.  She is cleared for discharge today with salt tablets 2 g twice daily and fluid restriction.  Nephrology plans for outpatient BMP check and follow-up noted    Chronic conditions remained stable no medication changes at discharge.  She reported an episode of nausea and vomiting earlier today however now she is tolerating oral fluids and is agreeable to discharge planning.  She is symptomatically improving hemodynamically stable and is deemed ready for discharge today.    Condition at Discharge: fair     Discharge Day Visit / Exam:     Subjective:      Patient reported episode of nausea and vomiting earlier in the morning today now she is feeling comfortable tolerating oral fluids and is agreeable to discharge planning  Discussed with RN at bedside      Vitals: Blood Pressure: 122/81 (05/10/24 0723)  Pulse: 84 (05/10/24 0723)  Temperature: 97.8 °F (36.6 °C) (05/10/24 0723)  Temp Source: Oral (05/09/24 0428)  Respirations: 16 (05/10/24 0723)  SpO2: 90 % (05/10/24 0800)  Exam:   Physical Exam    Comfortably in bed  Obese  Short thick neck  Lungs diminished breath sounds bilaterally  Vesicular breath sounds  Heart sounds S1 and S2 noted  Heart sounds are distant  Abdomen soft nontender  Awake follows commands  No Edema  No rash      Discharge instructions/Information to patient and family:   See after visit summary for information provided to patient and family.      Discharge plan discussed with nephrology  Discharge plan discussed with patient, called left a message for mother daughters over phone  Outpatient follow-up with nephrology, primary care  physician    Provisions for Follow-Up Care:  See after visit summary for information related to follow-up care and any pertinent home health orders.      Planned Readmission: no     Discharge Statement:  I spent 42 minutes discharging the patient. This time was spent on the day of discharge. I had direct contact with the patient on the day of discharge. Greater than 50% of the total time was spent examining patient, answering all patient questions, arranging and discussing plan of care with patient as well as directly providing post-discharge instructions.  Additional time then spent on discharge activities.    Discharge Medications:  See after visit summary for reconciled discharge medications provided to patient and family.      ** Please Note: This note has been constructed using a voice recognition system **

## 2024-05-10 NOTE — CASE MANAGEMENT
Case Management Discharge Planning Note    Patient name Irene Plascencia  Location [facility] 620/[facility] 620-01 MRN [id number]  : 1960 Date 5/10/2024       Current Admission Date: 2024  Current Admission Diagnosis:Hyponatremia   Patient Active Problem List    Diagnosis Date Noted    Cough 2024    Elevated vitamin B12 level 2024    Iron deficiency 2024    Acquired absence of other toe(s), unspecified side (HCC) 2024    Osteomyelitis, unspecified site, unspecified type ([facility]) 2024    Vitamin B12 deficiency (dietary) anemia 2023    Folate deficiency 2023    Neutropenia ([facility]) 2023    Post concussion syndrome 2023    Sepsis without acute organ dysfunction ([facility]) 2023    Acute respiratory failure with hypoxia ([facility]) 2023    Headache 2023    Nutritional anemia 2023    Stress incontinence 2023    Ulcer of toe, chronic, left, with unspecified severity ([facility]) 2023    Acute encephalopathy 2022    Thrombocytosis 2022    Abdominal pain 2022    Schreiber's esophagus 2022    Anemia 2022    Self neglect 2022    Pruritus 2022    Upper GI bleed 01/10/2022    Nausea and vomiting 10/22/2021    S/P tooth extraction 10/22/2021    SIRS (systemic inflammatory response syndrome) ([facility]) 2021    Hyperlipidemia 2021    Word finding difficulty 2021    Hiatal hernia 2021    Polyp of colon 2021    Melena 2020    Cellulitis of left upper extremity 2020    Erosive esophagitis 2020    Rash 2020    Iron deficiency anemia 2020    Multiple excoriations 2020    Medicare annual wellness visit, subsequent 2020    Fall 2020    Esophagitis 2020    Hyponatremia 2020    Problem related to living arrangement 2020    Hypokalemia     NMS (neuroleptic malignant syndrome) 2020    Preop exam for internal medicine 2019     Severe iron deficiency anemia  11/14/2019    Chronic bilateral low back pain without sciatica 11/01/2019    Right hip pain 07/15/2019    Primary hypertension 06/12/2019    Dermal hypersensitivity reaction 11/08/2018    Psychiatric disorder 08/21/2018    Schizoaffective disorder (HCC) 08/16/2018    Serotonin syndrome 08/13/2018    Other fatigue 06/19/2018    Spinal stenosis of lumbar region with neurogenic claudication 06/15/2018    Lumbar spondylosis 06/15/2018    T12 compression fracture (HCC) 06/15/2018    Synovial cyst of lumbar facet joint 06/15/2018    Localized osteoporosis with current pathological fracture with routine healing 05/25/2018    Lumbar radiculopathy 03/19/2018    DDD (degenerative disc disease), lumbar 03/19/2018    Spondylolisthesis at L4-L5 level 03/19/2018    Anxiety 10/31/2016    Acquired hypothyroidism 10/31/2016    Constipation 04/10/2014    Bulimia nervosa in remission 03/19/2014      LOS (days): 1  Geometric Mean LOS (GMLOS) (days): 2.9  Days to GMLOS:1.5     OBJECTIVE:  Risk of Unplanned Readmission Score: 23.02         Current admission status: Inpatient   Preferred Pharmacy:   [facility]/pharmacy #[id number] - [place], [place] - [address]  [address] [place] [address]  Phone: [phone] Fax: [phone]    Primary Care Provider: Raheem Cain MD    Primary Insurance: [employer]  Secondary Insurance: [facility]    DISCHARGE DETAILS:    Other Referral/Resources/Interventions Provided:  Financial Resources Provided: Indigent Transportation  Referral Comments: EDIE barnes signed, set up for [facility] at 1700.  RN aware.  Patient states she will work on obtaining a rental car through her car insurance.

## 2024-05-10 NOTE — PROGRESS NOTES
NEPHROLOGY PROGRESS NOTE   Irene Plascencia 63 y.o. female MRN: [id number]  Unit/Bed#: [facility] 620-01 Encounter: [id number]      Assessment:     63-year-old female with a past medical history of hyponatremia, hypertension, hypothyroidism, concussive syndrome with schizoaffective disorder who presents with cough found to have severe hyponatremia     Plan:    1.  Acute on chronic hyponatremia  Sodium is now stable at 130.  Will continue fluid restriction and add salt tablets patient admits to polydipsia as an outpatient  2.  Hypertension  Blood pressures are currently stable  3. Schizophrenic disorder  Currently on several medications that can induce hyponatremia.  Maintain on fluid restriction as an outpatient  4.  Concussion syndrome  Continue to monitor symptoms    Okay for discharge from a renal standpoint when otherwise medically stable.  Would keep on fluid restriction and salt tablets we will check a BMP in 1 week and she should have outpatient follow-up    SUBJECTIVE:    Patient was seen today.  She is resting comfortably.  She has no acute chest pain or shortness of breath no fevers or chills no nausea vomiting diarrhea or constipation    12 point review of systems was otherwise negative besides what is mentioned above.    Medications:    Current Facility-Administered Medications:     acetaminophen (TYLENOL) tablet 650 mg, 650 mg, Oral, Q6H PRN, Clarence Navas DO    aluminum-magnesium hydroxide-simethicone (MAALOX) oral suspension 30 mL, 30 mL, Oral, Q4H PRN, Char Clayton MD    amLODIPine (NORVASC) tablet 5 mg, 5 mg, Oral, Daily, Clarence Navas DO, 5 mg at 05/10/24 1016    benzonatate (TESSALON PERLES) capsule 100 mg, 100 mg, Oral, TID, Char Clayton MD, 100 mg at 05/10/24 1016    calcium carbonate (TUMS) chewable tablet 500 mg, 500 mg, Oral, Daily PRN, Char Clayton MD, 500 mg at 05/10/24 1051    famotidine (PEPCID) tablet 20 mg, 20 mg, Oral, Daily, Clarence Navas DO, 20 mg at  05/10/24 1016    folic acid (FOLVITE) tablet 400 mcg, 400 mcg, Oral, Daily, Clarence Navas DO, 400 mcg at 05/10/24 1016    levothyroxine tablet 25 mcg, 25 mcg, Oral, Early Morning, Clarence Navas DO, 25 mcg at 05/10/24 0501    LORazepam (ATIVAN) tablet 2 mg, 2 mg, Oral, Q4H PRN, Clarence Navas DO, 2 mg at 05/10/24 1037    ondansetron (ZOFRAN) injection 4 mg, 4 mg, Intravenous, Q8H PRN, Char Clayton MD, 4 mg at 05/10/24 1111    PARoxetine (PAXIL) tablet 60 mg, 60 mg, Oral, Daily, hCar Clayton MD, 60 mg at 05/10/24 1037    QUEtiapine (SEROquel XR) 24 hr tablet 400 mg, 400 mg, Oral, HS, Char Clayton MD    sodium chloride tablet 2 g, 2 g, Oral, BID, Antoni Howard DO    sucralfate (CARAFATE) tablet 1 g, 1 g, Oral, TID With Meals, Char Clayton MD, 1 g at 05/10/24 1016    ziprasidone (GEODON) capsule 80 mg, 80 mg, Oral, Daily, Clarence Navas DO, 80 mg at 05/10/24 1016    OBJECTIVE:    Vitals:    05/09/24 2013 05/09/24 2211 05/10/24 0723 05/10/24 0800   BP:  108/65 122/81    BP Location:       Pulse: 85 84 84    Resp:  18 16    Temp: 98.7 °F (37.1 °C) (!) 97.4 °F (36.3 °C) 97.8 °F (36.6 °C)    TempSrc:       SpO2: 93% 90% 92% 90%        Temp:  [97 °F (36.1 °C)-98.7 °F (37.1 °C)] 97.8 °F (36.6 °C)  HR:  [82-85] 84  Resp:  [16-18] 16  BP: (108-138)/(65-81) 122/81  SpO2:  [90 %-93 %] 90 %     There is no height or weight on file to calculate BMI.    Weight (last 2 days)       None            I/O last 3 completed shifts:  In: 1526.3 [P.O.:478; IV Piggyback:1048.3]  Out: -     No intake/output data recorded.      Physical exam:    General: no acute distress, cooperative  Eyes: conjunctivae pink, anicteric sclerae  ENT: lips and mucous membranes moist, no exudates, normal external ears  Neck: ROM intact, no JVD  Chest: No respiratory distress, no accessory muscle use  CVS: normal rate, non pericardial friction rub  Abdomen: soft, non-tender, non-distended, normoactive bowel  "sounds  Extremities: no edema of both legs  Skin: no rash  Neuro: awake, alert, oriented, grossly intact  Psych:  Pleasant affect    Invasive Devices:      Lab Results:   Results from last 7 days   Lab Units 05/10/24  0500 05/10/24  0013 05/09/24  1816 05/09/24  1212 05/09/24  0545 05/09/24  0444   WBC Thousand/uL 4.69  --   --   --   --  4.75   HEMOGLOBIN g/dL 12.1  --   --   --   --  12.6   HEMATOCRIT % 36.2  --   --   --   --  36.0   PLATELETS Thousands/uL 301  --   --   --   --  324   POTASSIUM mmol/L 3.5 3.4* 3.6 4.0  --  3.4*   CHLORIDE mmol/L 92* 92* 93* 94*  --  87*   CO2 mmol/L 29 30 29 28  --  26   BUN mg/dL 10 11 11 8  --  7   CREATININE mg/dL 0.63 0.68 0.64 0.59*  --  0.53*   CALCIUM mg/dL 8.9 9.1 10.0 9.9  --  10.2   MAGNESIUM mg/dL 1.9  --   --   --   --  1.6*   PHOSPHORUS mg/dL  --   --   --   --   --  3.4   ALK PHOS U/L 76  --   --   --   --  81   ALT U/L 17  --   --   --   --  15   AST U/L 18  --   --   --   --  19   NITRITE UA   --   --   --   --  Negative  --    LEUKOCYTES UA   --   --   --   --  Negative  --    BLOOD UA   --   --   --   --  Negative  --          Portions of the record may have been created with voice recognition software. Occasional wrong word or \"sound a like\" substitutions may have occurred due to the inherent limitations of voice recognition software. Read the chart carefully and recognize, using context, where substitutions have occurred.If you have any questions, please contact the dictating provider.    "

## 2024-05-10 NOTE — ASSESSMENT & PLAN NOTE
Hyponatremia with sodium of 123 at admission  Multifactorial with possible SIADH polydipsia contributing  Sodium levels improved without restriction  Discussed with nephrology.  She is recommended salt tablets 2 g twice daily and fluid restriction.    Nephrology plans for outpatient BMP and follow-up noted

## 2024-05-10 NOTE — NURSING NOTE
"Patient screaming out of her room \"I need my paxil now, or I am going to kill myself! I've been hospitalized in the past for trying to kill myself when not taking my medications. This is an emergency I need it immediately!\" I asked the patient if she was going to harm herself right now and she then stated \"no, not as long as I get my medications.\" Made patient aware that I would reach out to the doctor immediately. Tiger text sent to on call CHERYL Clayton with all this information. Paxil then ordered and administered to the patient immediately.   "

## 2024-05-13 ENCOUNTER — TRANSITIONAL CARE MANAGEMENT (OUTPATIENT)
Dept: INTERNAL MEDICINE CLINIC | Facility: CLINIC | Age: 64
End: 2024-05-13

## 2024-05-13 DIAGNOSIS — Z78.9 NEED FOR FOLLOW-UP BY SOCIAL WORKER: Primary | ICD-10-CM

## 2024-05-15 ENCOUNTER — OFFICE VISIT (OUTPATIENT)
Dept: NEUROLOGY | Facility: CLINIC | Age: 64
End: 2024-05-15
Payer: COMMERCIAL

## 2024-05-15 ENCOUNTER — TELEPHONE (OUTPATIENT)
Dept: PSYCHIATRY | Facility: CLINIC | Age: 64
End: 2024-05-15

## 2024-05-15 VITALS
TEMPERATURE: 97.3 F | DIASTOLIC BLOOD PRESSURE: 84 MMHG | OXYGEN SATURATION: 95 % | HEIGHT: 62 IN | WEIGHT: 176.7 LBS | HEART RATE: 89 BPM | SYSTOLIC BLOOD PRESSURE: 130 MMHG | BODY MASS INDEX: 32.52 KG/M2

## 2024-05-15 DIAGNOSIS — G47.9 SLEEP DIFFICULTIES: ICD-10-CM

## 2024-05-15 DIAGNOSIS — G44.309 POST-TRAUMATIC HEADACHE: Primary | ICD-10-CM

## 2024-05-15 DIAGNOSIS — E66.9 OBESITY (BMI 30-39.9): ICD-10-CM

## 2024-05-15 DIAGNOSIS — F41.9 ANXIETY AND DEPRESSION: ICD-10-CM

## 2024-05-15 DIAGNOSIS — S06.0X0A CONCUSSION WITHOUT LOSS OF CONSCIOUSNESS, INITIAL ENCOUNTER: ICD-10-CM

## 2024-05-15 DIAGNOSIS — F32.A ANXIETY AND DEPRESSION: ICD-10-CM

## 2024-05-15 PROCEDURE — 99214 OFFICE O/P EST MOD 30 MIN: CPT | Performed by: STUDENT IN AN ORGANIZED HEALTH CARE EDUCATION/TRAINING PROGRAM

## 2024-05-15 NOTE — PATIENT INSTRUCTIONS
Referrals  Sleep medicine    Behavioral Health  [facility], [place]: [phone]  [facility] [place] [facility]: [phone]  Dr. David White: [phone]  [facility] [facility]: [phone]  [facility]: [phone]     Headache Calendar  Please maintain a headache calendar  Consider using phone applications such as Migraine Skip or SHER[employer] assistant Migraine Tracker     Headache/migraine treatment:   Acute medications (for immediate treatment of a headache):   It is ok to take acetaminophen (Tylenol) if they help your headaches you should limit these to No more than 2-3 times a week to avoid medication overuse/rebound headaches.     Lifestyle Recommendations:  [x] SLEEP - Maintain a regular sleep schedule: Adults need at least 7-8 hours of uninterrupted a night. Maintain good sleep hygiene:  Going to bed and waking up at consistent times, avoiding excessive daytime naps, avoiding caffeinated beverages in the evening, avoid excessive stimulation in the evening and generally using bed primarily for sleeping.  One hour before bedtime would recommend turning lights down lower, decreasing your activity (may read quietly, listen to music at a low volume). When you get into bed, should eliminate all technology (no texting, emailing, playing with your phone, iPad or tablet in bed).  [x] HYDRATION - Maintain good hydration.  Drink  2L of fluid a day (4 typical small water bottles)  [x] DIET - Maintain good nutrition. In particular don't skip meals and try and eat healthy balanced meals regularly.  [x] TRIGGERS - Look for other triggers and avoid them: Limit caffeine to 1-2 cups a day or less. Avoid dietary triggers that you have noticed bring on your headaches (this could include aged cheese, peanuts, MSG, aspartame and nitrates).  [x] EXERCISE - physical exercise as we all know is good for you in many ways, and not only is good for  your heart, but also is beneficial for your mental health, cognitive health and  chronic pain/headaches. I would encourage at the least 5 days of physical exercise weekly for at least 30 minutes.      Education and Follow-up  [x] Please call with any questions or concerns. Of course if any new concerning symptoms go to the emergency department.  [x] Follow up as needed

## 2024-05-15 NOTE — TELEPHONE ENCOUNTER
----- Message from Sri WILL sent at 5/15/2024  1:38 PM EDT -----  Regarding: RE: consult  Can you call this patient for a one time consult and make the appt with Mathew on 5/24 at 9:30.     Mayra  ----- Message -----  From: Wang Morales DO  Sent: 5/15/2024   1:19 PM EDT  To: Kashif Traylor MD; Brien Cain MD; #  Subject: consult                                          Hi Mayra,    Do you think you could work with Dr. Cain to find a good consult spot for this patient? This has been discussed and agreed upon by all on this email.    Thanks for your attention to this.     This will be a one time consult and then transfer to resident clinic for [facility] for earliest appointment in July.    Thanks,  Dr. Morales

## 2024-05-15 NOTE — PROGRESS NOTES
Review of Systems   Constitutional:  Negative for appetite change, fatigue and fever.   HENT: Negative.  Negative for hearing loss, tinnitus, trouble swallowing and voice change.    Eyes: Negative.  Negative for photophobia, pain and visual disturbance.   Respiratory: Negative.  Negative for shortness of breath.    Cardiovascular: Negative.  Negative for palpitations.   Gastrointestinal: Negative.  Negative for nausea and vomiting.   Endocrine: Negative.  Negative for cold intolerance.   Genitourinary: Negative.  Negative for dysuria, frequency and urgency.   Musculoskeletal:  Negative for back pain, gait problem, myalgias, neck pain and neck stiffness.   Skin: Negative.  Negative for rash.   Allergic/Immunologic: Negative.    Neurological:  Positive for headaches. Negative for dizziness, tremors, seizures, syncope, facial asymmetry, speech difficulty, weakness, light-headedness and numbness.   Hematological: Negative.  Does not bruise/bleed easily.   Psychiatric/Behavioral:  Positive for sleep disturbance. Negative for confusion and hallucinations.    All other systems reviewed and are negative.    Since your last visit are your headaches Improved    Any change to the headache type? No     What is your current headache frequency: HA free when taking Celebrex, HA only when dose missed     Are you taking your current medications as prescribed? No    If no, why not: Topamax side effect of depression (stopped 2 months ago)    Do you have any side effects? no    How may days per week do you take an abortive medicine? 0/7
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
## 2024-05-15 NOTE — TELEPHONE ENCOUNTER
Contacted patient in regards to PCP One-Time Consult to verify needs of services in attempts to offer patient appointment with our [facility]. Unable to contact pt or lvm as pt's vm box is full.

## 2024-05-16 ENCOUNTER — TELEPHONE (OUTPATIENT)
Age: 64
End: 2024-05-16

## 2024-05-16 ENCOUNTER — TELEMEDICINE (OUTPATIENT)
Dept: INTERNAL MEDICINE CLINIC | Facility: CLINIC | Age: 64
End: 2024-05-16
Payer: COMMERCIAL

## 2024-05-16 VITALS — WEIGHT: 176 LBS | TEMPERATURE: 100.1 F | BODY MASS INDEX: 32.39 KG/M2 | HEIGHT: 62 IN

## 2024-05-16 DIAGNOSIS — R11.10 VOMITING: ICD-10-CM

## 2024-05-16 DIAGNOSIS — R05.9 COUGH, UNSPECIFIED TYPE: ICD-10-CM

## 2024-05-16 DIAGNOSIS — F41.9 ANXIETY: ICD-10-CM

## 2024-05-16 DIAGNOSIS — R05.9 COUGH, UNSPECIFIED TYPE: Primary | ICD-10-CM

## 2024-05-16 PROCEDURE — 99213 OFFICE O/P EST LOW 20 MIN: CPT | Performed by: INTERNAL MEDICINE

## 2024-05-16 PROCEDURE — G2211 COMPLEX E/M VISIT ADD ON: HCPCS | Performed by: INTERNAL MEDICINE

## 2024-05-16 RX ORDER — AZITHROMYCIN 250 MG/1
TABLET, FILM COATED ORAL
Qty: 6 TABLET | Refills: 0 | Status: SHIPPED | OUTPATIENT
Start: 2024-05-16 | End: 2024-05-20

## 2024-05-16 RX ORDER — ONDANSETRON 4 MG/1
4 TABLET, FILM COATED ORAL EVERY 6 HOURS
Qty: 12 TABLET | Refills: 5 | Status: SHIPPED | OUTPATIENT
Start: 2024-05-16

## 2024-05-16 RX ORDER — AZITHROMYCIN 250 MG/1
250 TABLET, FILM COATED ORAL EVERY 24 HOURS
Qty: 6 TABLET | Refills: 0 | Status: SHIPPED | OUTPATIENT
Start: 2024-05-16 | End: 2024-05-16

## 2024-05-16 NOTE — PROGRESS NOTES
Virtual Regular Visit    Verification of patient location:    Patient is located at Home in the following state in which I hold an active license [place]      Assessment/Plan:    Problem List Items Addressed This Visit        Behavioral Health    Anxiety       Other    Cough - Primary     Will treat with azithromycin, follow up if not improving         Relevant Medications    azithromycin (ZITHROMAX) 250 mg tablet   Other Visit Diagnoses     Vomiting        Relevant Medications    ondansetron (ZOFRAN) 4 mg tablet               Reason for visit is   Chief Complaint   Patient presents with   • Fever     Low grade fever for the past 2 weeks, cough, Ed visit 05/02. yesterday temp was 100.0   • Virtual Regular Visit          Encounter provider Raheem Cain MD      Recent Visits  No visits were found meeting these conditions.  Showing recent visits within past 7 days and meeting all other requirements  Today's Visits  Date Type Provider Dept   05/16/24 Telemedicine Raheem Cain MD  [facility]   Showing today's visits and meeting all other requirements  Future Appointments  No visits were found meeting these conditions.  Showing future appointments within next 150 days and meeting all other requirements       The patient was identified by name and date of birth. Irene Plascencia was informed that this is a telemedicine visit and that the visit is being conducted through the Epic Embedded platform. She agrees to proceed..  My office door was closed. No one else was in the room.  She acknowledged consent and understanding of privacy and security of the video platform. The patient has agreed to participate and understands they can discontinue the visit at any time.    Patient is aware this is a billable service.     Subjective  Irene Plascencia is a 63 y.o. female  .      Pt reports having low grade temp, congestion, cough, chills, sweats.      Fever  Associated symptoms include chills, congestion,  coughing and a fever.        Past Medical History:   Diagnosis Date   • Anemia    • Anxiety    • Arthritis    • Back pain at L4-L5 level    • Schreiber esophagus    • Bulimia    • Colon polyp    • Disease of thyroid gland     hypothyroid   • GERD (gastroesophageal reflux disease)    • Hearing loss in left ear    • History of transfusion    • Hyperlipidemia    • Hypertension    • Hypothyroidism    • Leukopenia    • NMS (neuroleptic malignant syndrome) 01/03/2020   • Pneumonia    • RA (rheumatoid arthritis) (HCC)     in feet   • Sciatica    • Seizure (HCC)     due to medication mix up 8/2018 only one historically   • Skin spots, red     lower right arm - poss from cat- no s/s infection   • Synovial cyst of lumbar spine    • Vertigo    • Wears dentures     full set   • Weight gain        Past Surgical History:   Procedure Laterality Date   • BONE MARROW BIOPSY     • BREAST SURGERY      enlargement with implants   • CLOSED REDUCTION DISTAL FEMUR FRACTURE     • COLONOSCOPY     • COSMETIC SURGERY     • DENTAL SURGERY     • HIP ARTHROPLASTY Right 01/02/2020    Procedure: REVISION TOTAL HIP ARTHROPLASTY WITH REPAIR OF PARIPROSTHETIC FRACTURE - POSTERIOR APPROACH;  Surgeon: Dilan Pedersen MD;  Location: [facility];  Service: Orthopedics   • KS AMPUTATION METATARSAL W/TOE SINGLE Left 04/07/2023    Procedure: AMPUTATION TOE 4th;  Surgeon: Conner Bartlett DPM;  Location:  MAIN OR;  Service: Podiatry   • KS ARTHRP ACETBLR/PROX FEM PROSTC AGRFT/ALGRFT Right 12/11/2019    Procedure: ARTHROPLASTY HIP TOTAL ANTERIOR;  Surgeon: Dilan Pedersen MD;  Location:  MAIN OR;  Service: Orthopedics   • KS ESOPHAGOGASTRODUODENOSCOPY TRANSORAL DIAGNOSTIC N/A 05/11/2021    Procedure: ESOPHAGOGASTRODUODENOSCOPY (EGD);  Surgeon: David Cedeño MD;  Location:  MAIN OR;  Service: General   • KS LAPS RPR PARAESPHGL HRNA INCL FUNDPLSTY W/MESH N/A 05/11/2021    Procedure: REPAIR HERNIA PARAESOPHAGEAL  LAPAROSCOPIC;  Surgeon: David Cedeño MD;   Location: [facility];  Service: General   • OK UNLISTED PROCEDURE ESOPHAGUS N/A 05/11/2021    Procedure: FUNDOPLICATION TRANSORAL INCISIONLESS;  Surgeon: Brad Cadet MD;  Location: [facility];  Service: Gastroenterology   • RHINOPLASTY     • SINUS SURGERY     • TOE AMPUTATION Left     2023 4th toe   • TRANSORAL INCISIONLESS FUNDOPLICATION (TIF)  05/11/2021       Current Outpatient Medications   Medication Sig Dispense Refill   • amLODIPine (NORVASC) 5 mg tablet TAKE 1 TABLET (5 MG TOTAL) BY MOUTH DAILY. 90 tablet 3   • azithromycin (ZITHROMAX) 250 mg tablet Take 1 tablet (250 mg total) by mouth every 24 hours for 5 days 2 tabs first day, then one tab daily 6 tablet 0   • benzonatate (TESSALON PERLES) 100 mg capsule Take 1 capsule (100 mg total) by mouth 3 (three) times a day as needed for cough 30 capsule 5   • celecoxib (CeleBREX) 200 mg capsule Take 1 capsule (200 mg total) by mouth daily 30 capsule 5   • famotidine (PEPCID) 20 mg tablet Take 1 tablet (20 mg total) by mouth daily 14 tablet 0   • levothyroxine 25 mcg tablet TAKE 1 TABLET BY MOUTH EVERY DAY 90 tablet 3   • LORazepam (ATIVAN) 2 mg tablet Take 1 tablet (2 mg total) by mouth every 4 (four) hours as needed for anxiety 180 tablet 1   • ondansetron (ZOFRAN) 4 mg tablet Take 1 tablet (4 mg total) by mouth every 6 (six) hours 12 tablet 5   • pantoprazole (PROTONIX) 40 mg tablet TAKE 1 TABLET BY MOUTH TWICE A  tablet 1   • PARoxetine (PAXIL) 30 mg tablet TAKE 2 TABLETS (60 MG TOTAL) BY MOUTH IN THE MORNING 180 tablet 1   • QUEtiapine (SEROquel XR) 400 mg 24 hr tablet TAKE 1 TABLET (400 MG TOTAL) BY MOUTH DAILY AT BEDTIME 90 tablet 3   • sodium chloride 1 g tablet Take 2 tablets (2 g total) by mouth 2 (two) times a day 120 tablet 0   • sucralfate (CARAFATE) 1 g tablet TAKE 1 TABLET (1 G TOTAL) BY MOUTH 3 (THREE) TIMES A DAY WITH MEALS 270 tablet 1   • triamcinolone (KENALOG) 0.1 % cream APPLY TOPICALLY 2 TIMES A DAY AS NEEDED FOR RASH. 160 g 5   •  "ziprasidone (GEODON) 80 mg capsule TAKE 1 CAPSULE BY MOUTH EVERY DAY 90 capsule 3   • [facility] Vitamin B-12 1000 MCG tablet TAKE 1 TABLET BY MOUTH EVERY DAY (Patient not taking: Reported on 5/16/2024) 90 tablet 1   • EPINEPHrine (EPIPEN) 0.3 mg/0.3 mL SOAJ Inject 0.3 mL (0.3 mg total) into a muscle once for 1 dose 0.6 mL 0   • ferrous sulfate 325 (65 Fe) mg tablet Take 1 tablet (325 mg total) by mouth daily with breakfast (Patient not taking: Reported on 5/16/2024) 60 tablet 3   • folic acid (FOLVITE) 400 mcg tablet Take 1 tablet (400 mcg total) by mouth daily (Patient not taking: Reported on 5/16/2024) 30 tablet 3     No current facility-administered medications for this visit.        Allergies   Allergen Reactions   • Latex Anaphylaxis, Rash and Tongue Swelling     Band aids, adhesives wears normal underwear, can eat bananas  USE PAPER TAPE   • Risperdal [Risperidone] Shortness Of Breath     Rapid heart beat, SOB   • Zyprexa [Olanzapine] Shortness Of Breath     Rapid heartbeat       Review of Systems   Constitutional:  Positive for chills and fever.   HENT:  Positive for congestion.    Respiratory:  Positive for cough. Negative for shortness of breath.        Video Exam    Vitals:    05/16/24 0923   Temp: 100.1 °F (37.8 °C)   Weight: 79.8 kg (176 lb)   Height: 5' 2\" (1.575 m)       Physical Exam  Constitutional:       General: She is not in acute distress.  Pulmonary:      Effort: Pulmonary effort is normal. No respiratory distress.   Neurological:      Mental Status: She is alert.          Visit Time  Total Visit Duration: 20        "

## 2024-05-16 NOTE — PATIENT INSTRUCTIONS
Problem List Items Addressed This Visit          Behavioral Health    Anxiety       Other    Cough - Primary     Will treat with azithromycin, follow up if not improving         Relevant Medications    azithromycin (ZITHROMAX) 250 mg tablet     Other Visit Diagnoses       Vomiting        Relevant Medications    ondansetron (ZOFRAN) 4 mg tablet

## 2024-05-17 ENCOUNTER — PATIENT OUTREACH (OUTPATIENT)
Dept: INTERNAL MEDICINE CLINIC | Facility: CLINIC | Age: 64
End: 2024-05-17

## 2024-05-17 ENCOUNTER — TELEPHONE (OUTPATIENT)
Age: 64
End: 2024-05-17
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
## 2024-05-17 NOTE — TELEPHONE ENCOUNTER
Contacted patient in regards to PCP [facility] to verify needs of services in attempts to offer patient appointment with our [facility]. Unable to contact pt or lvm as vm box is full.

## 2024-05-17 NOTE — TELEPHONE ENCOUNTER
Pt. Wanted me to tell Dr. Cain that she started the Azithromycin yesterday and today and she is already feeling much better and she is so happy.

## 2024-05-21 ENCOUNTER — HOSPITAL ENCOUNTER (EMERGENCY)
Facility: HOSPITAL | Age: 64
Discharge: HOME | End: 2024-05-21
Attending: EMERGENCY MEDICINE | Admitting: EMERGENCY MEDICINE
Payer: COMMERCIAL

## 2024-05-21 ENCOUNTER — APPOINTMENT (EMERGENCY)
Dept: RADIOLOGY | Facility: HOSPITAL | Age: 64
End: 2024-05-21
Payer: COMMERCIAL

## 2024-05-21 ENCOUNTER — NURSE TRIAGE (OUTPATIENT)
Age: 64
End: 2024-05-21

## 2024-05-21 VITALS
HEART RATE: 88 BPM | DIASTOLIC BLOOD PRESSURE: 86 MMHG | SYSTOLIC BLOOD PRESSURE: 142 MMHG | TEMPERATURE: 98.8 F | RESPIRATION RATE: 18 BRPM | OXYGEN SATURATION: 94 %

## 2024-05-21 DIAGNOSIS — E87.6 HYPOKALEMIA: Primary | ICD-10-CM

## 2024-05-21 DIAGNOSIS — R10.9 ABDOMINAL PAIN: ICD-10-CM

## 2024-05-21 DIAGNOSIS — R19.7 DIARRHEA: ICD-10-CM

## 2024-05-21 LAB
2HR DELTA HS TROPONIN: 0 NG/L
ALBUMIN SERPL BCP-MCNC: 4.3 G/DL (ref 3.5–5)
ALP SERPL-CCNC: 112 U/L (ref 34–104)
ALT SERPL W P-5'-P-CCNC: 14 U/L (ref 7–52)
ANION GAP SERPL CALCULATED.3IONS-SCNC: 10 MMOL/L (ref 4–13)
AST SERPL W P-5'-P-CCNC: 13 U/L (ref 13–39)
ATRIAL RATE: 88 BPM
BACTERIA UR QL AUTO: NORMAL /HPF
BASOPHILS # BLD AUTO: 0.03 THOUSANDS/ÂΜL (ref 0–0.1)
BASOPHILS NFR BLD AUTO: 1 % (ref 0–1)
BILIRUB SERPL-MCNC: 0.3 MG/DL (ref 0.2–1)
BILIRUB UR QL STRIP: NEGATIVE
BUN SERPL-MCNC: 6 MG/DL (ref 5–25)
CALCIUM SERPL-MCNC: 10.2 MG/DL (ref 8.4–10.2)
CARDIAC TROPONIN I PNL SERPL HS: 4 NG/L
CARDIAC TROPONIN I PNL SERPL HS: 4 NG/L
CHLORIDE SERPL-SCNC: 91 MMOL/L (ref 96–108)
CLARITY UR: CLEAR
CO2 SERPL-SCNC: 30 MMOL/L (ref 21–32)
COLOR UR: COLORLESS
CREAT SERPL-MCNC: 0.55 MG/DL (ref 0.6–1.3)
EOSINOPHIL # BLD AUTO: 0.19 THOUSAND/ÂΜL (ref 0–0.61)
EOSINOPHIL NFR BLD AUTO: 4 % (ref 0–6)
ERYTHROCYTE [DISTWIDTH] IN BLOOD BY AUTOMATED COUNT: 13.7 % (ref 11.6–15.1)
GFR SERPL CREATININE-BSD FRML MDRD: 100 ML/MIN/1.73SQ M
GLUCOSE SERPL-MCNC: 92 MG/DL (ref 65–140)
GLUCOSE UR STRIP-MCNC: NEGATIVE MG/DL
HCT VFR BLD AUTO: 40.5 % (ref 34.8–46.1)
HGB BLD-MCNC: 13.5 G/DL (ref 11.5–15.4)
HGB UR QL STRIP.AUTO: NEGATIVE
IMM GRANULOCYTES # BLD AUTO: 0.03 THOUSAND/UL (ref 0–0.2)
IMM GRANULOCYTES NFR BLD AUTO: 1 % (ref 0–2)
KETONES UR STRIP-MCNC: NEGATIVE MG/DL
LEUKOCYTE ESTERASE UR QL STRIP: ABNORMAL
LIPASE SERPL-CCNC: 24 U/L (ref 11–82)
LYMPHOCYTES # BLD AUTO: 0.53 THOUSANDS/ÂΜL (ref 0.6–4.47)
LYMPHOCYTES NFR BLD AUTO: 10 % (ref 14–44)
MCH RBC QN AUTO: 27.1 PG (ref 26.8–34.3)
MCHC RBC AUTO-ENTMCNC: 33.3 G/DL (ref 31.4–37.4)
MCV RBC AUTO: 81 FL (ref 82–98)
MONOCYTES # BLD AUTO: 0.46 THOUSAND/ÂΜL (ref 0.17–1.22)
MONOCYTES NFR BLD AUTO: 9 % (ref 4–12)
NEUTROPHILS # BLD AUTO: 4.12 THOUSANDS/ÂΜL (ref 1.85–7.62)
NEUTS SEG NFR BLD AUTO: 75 % (ref 43–75)
NITRITE UR QL STRIP: NEGATIVE
NON-SQ EPI CELLS URNS QL MICRO: NORMAL /HPF
NRBC BLD AUTO-RTO: 0 /100 WBCS
P AXIS: 68 DEGREES
PH UR STRIP.AUTO: 6.5 [PH]
PLATELET # BLD AUTO: 314 THOUSANDS/UL (ref 149–390)
PMV BLD AUTO: 7.9 FL (ref 8.9–12.7)
POTASSIUM SERPL-SCNC: 2.9 MMOL/L (ref 3.5–5.3)
PR INTERVAL: 136 MS
PROT SERPL-MCNC: 7.1 G/DL (ref 6.4–8.4)
PROT UR STRIP-MCNC: NEGATIVE MG/DL
QRS AXIS: 6 DEGREES
QRSD INTERVAL: 84 MS
QT INTERVAL: 364 MS
QTC INTERVAL: 440 MS
RBC # BLD AUTO: 4.98 MILLION/UL (ref 3.81–5.12)
RBC #/AREA URNS AUTO: NORMAL /HPF
RENAL EPI CELLS #/AREA URNS HPF: PRESENT /[HPF]
SODIUM SERPL-SCNC: 131 MMOL/L (ref 135–147)
SP GR UR STRIP.AUTO: 1.01 (ref 1–1.03)
T WAVE AXIS: 75 DEGREES
TRANS CELLS #/AREA URNS HPF: PRESENT /[HPF]
UROBILINOGEN UR STRIP-ACNC: <2 MG/DL
VENTRICULAR RATE: 88 BPM
WBC # BLD AUTO: 5.36 THOUSAND/UL (ref 4.31–10.16)
WBC #/AREA URNS AUTO: NORMAL /HPF

## 2024-05-21 PROCEDURE — 96366 THER/PROPH/DIAG IV INF ADDON: CPT

## 2024-05-21 PROCEDURE — 93010 ELECTROCARDIOGRAM REPORT: CPT | Performed by: INTERNAL MEDICINE

## 2024-05-21 PROCEDURE — 80053 COMPREHEN METABOLIC PANEL: CPT

## 2024-05-21 PROCEDURE — 96365 THER/PROPH/DIAG IV INF INIT: CPT

## 2024-05-21 PROCEDURE — 81001 URINALYSIS AUTO W/SCOPE: CPT

## 2024-05-21 PROCEDURE — 74177 CT ABD & PELVIS W/CONTRAST: CPT

## 2024-05-21 PROCEDURE — 99285 EMERGENCY DEPT VISIT HI MDM: CPT | Performed by: EMERGENCY MEDICINE

## 2024-05-21 PROCEDURE — 36415 COLL VENOUS BLD VENIPUNCTURE: CPT

## 2024-05-21 PROCEDURE — 96375 TX/PRO/DX INJ NEW DRUG ADDON: CPT

## 2024-05-21 PROCEDURE — 93005 ELECTROCARDIOGRAM TRACING: CPT

## 2024-05-21 PROCEDURE — 83690 ASSAY OF LIPASE: CPT

## 2024-05-21 PROCEDURE — 84484 ASSAY OF TROPONIN QUANT: CPT

## 2024-05-21 PROCEDURE — 85025 COMPLETE CBC W/AUTO DIFF WBC: CPT

## 2024-05-21 PROCEDURE — 99284 EMERGENCY DEPT VISIT MOD MDM: CPT

## 2024-05-21 RX ORDER — POTASSIUM CHLORIDE 14.9 MG/ML
20 INJECTION INTRAVENOUS
Status: DISCONTINUED | OUTPATIENT
Start: 2024-05-21 | End: 2024-05-21 | Stop reason: HOSPADM

## 2024-05-21 RX ORDER — DICYCLOMINE HCL 20 MG
20 TABLET ORAL ONCE
Status: COMPLETED | OUTPATIENT
Start: 2024-05-21 | End: 2024-05-21

## 2024-05-21 RX ORDER — FAMOTIDINE 10 MG/ML
20 INJECTION, SOLUTION INTRAVENOUS ONCE
Status: COMPLETED | OUTPATIENT
Start: 2024-05-21 | End: 2024-05-21

## 2024-05-21 RX ORDER — ONDANSETRON 2 MG/ML
4 INJECTION INTRAMUSCULAR; INTRAVENOUS ONCE
Status: COMPLETED | OUTPATIENT
Start: 2024-05-21 | End: 2024-05-21

## 2024-05-21 RX ORDER — POTASSIUM CHLORIDE 20 MEQ/1
40 TABLET, EXTENDED RELEASE ORAL ONCE
Status: COMPLETED | OUTPATIENT
Start: 2024-05-21 | End: 2024-05-21

## 2024-05-21 RX ORDER — DICYCLOMINE HCL 20 MG
20 TABLET ORAL 2 TIMES DAILY
Qty: 20 TABLET | Refills: 0 | Status: SHIPPED | OUTPATIENT
Start: 2024-05-21

## 2024-05-21 RX ORDER — MAGNESIUM HYDROXIDE/ALUMINUM HYDROXICE/SIMETHICONE 120; 1200; 1200 MG/30ML; MG/30ML; MG/30ML
30 SUSPENSION ORAL ONCE
Status: COMPLETED | OUTPATIENT
Start: 2024-05-21 | End: 2024-05-21

## 2024-05-21 RX ADMIN — DICYCLOMINE HYDROCHLORIDE 20 MG: 20 TABLET ORAL at 04:23

## 2024-05-21 RX ADMIN — IOHEXOL 85 ML: 350 INJECTION, SOLUTION INTRAVENOUS at 05:27

## 2024-05-21 RX ADMIN — ALUMINUM HYDROXIDE, MAGNESIUM HYDROXIDE, DIMETHICONE 30 ML: 200; 200; 20 LIQUID ORAL at 04:23

## 2024-05-21 RX ADMIN — ONDANSETRON 4 MG: 2 INJECTION INTRAMUSCULAR; INTRAVENOUS at 05:32

## 2024-05-21 RX ADMIN — POTASSIUM CHLORIDE 40 MEQ: 1500 TABLET, EXTENDED RELEASE ORAL at 05:32

## 2024-05-21 RX ADMIN — POTASSIUM CHLORIDE 20 MEQ: 14.9 INJECTION, SOLUTION INTRAVENOUS at 05:32

## 2024-05-21 RX ADMIN — FAMOTIDINE 20 MG: 10 INJECTION INTRAVENOUS at 04:23

## 2024-05-21 NOTE — TELEPHONE ENCOUNTER
"LOV 8/15/23 Dr. Cadet, Procedure EGD 12/12/22, seen in \[facility]\"" ED today labs/CT completed for abdominal pain. I scheduled patient for ED follow up 6/5/24.    I spoke with patient she was discharged today and notes she continues with upper abd pain.Patient discharged with dicyclomine but she has not been to pharmacy yet to .  She states she has been taking sucralfate 2 tabs TID and pantoprazole 40 mg TID.  I reviewed that she start famotidine 20 mg twice a day before lunch and at bedtime, take the pantoprazole 40 mg twice a day before breakfast and lunch and to cut back the sucralfate to one tablet TID as originally ordered. I reviewed GERD diet and not to lay down for at least one hour after eating, keep hydrating. She already elevates head of bed to sleep. I scheduled patient earlier for ER follow up since originally scheduled in September. Patient advised to report back to ED for severe pain.    Reason for Disposition   Age > 60 years    Answer Assessment - Initial Assessment Questions  1. LOCATION: \"Where does it hurt?\"       Upper middle of stomach  2. RADIATION: \"Does the pain shoot anywhere else?\" (e.g., chest, back)      denies  3. ONSET: \"When did the pain begin?\" (e.g., minutes, hours or days ago)       Last Thursday  4. SUDDEN: \"Gradual or sudden onset?\"      Was given medication in the hospital that nurse was not able to identify and immediately started with intense pain/vomiting after administration. Pain continued since.  5. PATTERN \"Does the pain come and go, or is it constant?\"     - If constant: \"Is it getting better, staying the same, or worsening?\"       (Note: Constant means the pain never goes away completely; most serious pain is constant and it progresses)      - If intermittent: \"How long does it last?\" \"Do you have pain now?\"      (Note: Intermittent means the pain goes away completely between bouts)      constant  6. SEVERITY: \"How bad is the pain?\"  (e.g., Scale 1-10; mild, moderate, " "or severe)    - MILD (1-3): doesn't interfere with normal activities, abdomen soft and not tender to touch     - MODERATE (4-7): interferes with normal activities or awakens from sleep, tender to touch     - SEVERE (8-10): excruciating pain, doubled over, unable to do any normal activities       moderate  7. RECURRENT SYMPTOM: \"Have you ever had this type of stomach pain before?\" If Yes, ask: \"When was the last time?\" and \"What happened that time?\"       Never has had this type of pain before  8. CAUSE: \"What do you think is causing the stomach pain?\"      Medication administration at hospital  9. RELIEVING/AGGRAVATING FACTORS: \"What makes it better or worse?\" (e.g., movement, antacids, bowel movement)      Taking sucralfate 2 tabs TID, pantoprazole 40 mg TID states helps  10. OTHER SYMPTOMS: \"Has there been any vomiting, diarrhea, constipation, or urine problems?\"        Some diarrhea overnight but took a laxative for constipation but now improved  11. PREGNANCY: \"Is there any chance you are pregnant?\" \"When was your last menstrual period?\"        N/A    Protocols used: Abdominal Pain - Female-ADULT-[place]    "

## 2024-05-21 NOTE — ED PROVIDER NOTES
History  Chief Complaint   Patient presents with    Abdominal Pain     Pt came via ems c/o epigastic pain and dizziness. She states she is very anxious and not taking her medication     Patient is a 63-year-old female with a past medical history of GERD, Schreiber's esophagus, presenting with epigastric abdominal pain that started this evening.  Pain is epigastric, nonradiating, worse with eating, additionally felt in the right lower quadrant.  Patient denies any chest pain, shortness of breath, fevers.  Patient additionally notes that she was having some diarrhea earlier in the night, nonbloody, that has since resolved.  No further symptoms on review of systems.        Prior to Admission Medications   Prescriptions Last Dose Informant Patient Reported? Taking?   [facility] Vitamin B-12 1000 MCG tablet  Self No No   Sig: TAKE 1 TABLET BY MOUTH EVERY DAY   Patient not taking: Reported on 2024   EPINEPHrine (EPIPEN) 0.3 mg/0.3 mL SOAJ  Self No No   Sig: Inject 0.3 mL (0.3 mg total) into a muscle once for 1 dose   LORazepam (ATIVAN) 2 mg tablet  Self No No   Sig: Take 1 tablet (2 mg total) by mouth every 4 (four) hours as needed for anxiety   PARoxetine (PAXIL) 30 mg tablet  Self No No   Sig: TAKE 2 TABLETS (60 MG TOTAL) BY MOUTH IN THE MORNING   QUEtiapine (SEROquel XR) 400 mg 24 hr tablet  Self No No   Sig: TAKE 1 TABLET (400 MG TOTAL) BY MOUTH DAILY AT BEDTIME   amLODIPine (NORVASC) 5 mg tablet  Self No No   Sig: TAKE 1 TABLET (5 MG TOTAL) BY MOUTH DAILY.   azithromycin (ZITHROMAX) 250 mg tablet   No No   Si tabs first day, then one tab daily   benzonatate (TESSALON PERLES) 100 mg capsule  Self No No   Sig: Take 1 capsule (100 mg total) by mouth 3 (three) times a day as needed for cough   celecoxib (CeleBREX) 200 mg capsule  Self No No   Sig: Take 1 capsule (200 mg total) by mouth daily   famotidine (PEPCID) 20 mg tablet  Self No No   Sig: Take 1 tablet (20 mg total) by mouth daily   ferrous sulfate 325 (65 Fe) mg  tablet  Self No No   Sig: Take 1 tablet (325 mg total) by mouth daily with breakfast   Patient not taking: Reported on 5/16/2024   folic acid (FOLVITE) 400 mcg tablet  Self No No   Sig: Take 1 tablet (400 mcg total) by mouth daily   Patient not taking: Reported on 5/16/2024   levothyroxine 25 mcg tablet  Self No No   Sig: TAKE 1 TABLET BY MOUTH EVERY DAY   ondansetron (ZOFRAN) 4 mg tablet   No No   Sig: Take 1 tablet (4 mg total) by mouth every 6 (six) hours   pantoprazole (PROTONIX) 40 mg tablet  Self No No   Sig: TAKE 1 TABLET BY MOUTH TWICE A DAY   sodium chloride 1 g tablet  Self No No   Sig: Take 2 tablets (2 g total) by mouth 2 (two) times a day   sucralfate (CARAFATE) 1 g tablet  Self No No   Sig: TAKE 1 TABLET (1 G TOTAL) BY MOUTH 3 (THREE) TIMES A DAY WITH MEALS   triamcinolone (KENALOG) 0.1 % cream  Self No No   Sig: APPLY TOPICALLY 2 TIMES A DAY AS NEEDED FOR RASH.   ziprasidone (GEODON) 80 mg capsule  Self No No   Sig: TAKE 1 CAPSULE BY MOUTH EVERY DAY      Facility-Administered Medications: None       Past Medical History:   Diagnosis Date    Anemia     Anxiety     Arthritis     Back pain at L4-L5 level     Schreiber esophagus     Bulimia     Colon polyp     Disease of thyroid gland     hypothyroid    GERD (gastroesophageal reflux disease)     Hearing loss in left ear     History of transfusion     Hyperlipidemia     Hypertension     Hypothyroidism     Leukopenia     NMS (neuroleptic malignant syndrome) 01/03/2020    Pneumonia     RA (rheumatoid arthritis) (HCC)     in feet    Sciatica     Seizure (HCC)     due to medication mix up 8/2018 only one historically    Skin spots, red     lower right arm - poss from cat- no s/s infection    Synovial cyst of lumbar spine     Vertigo     Wears dentures     full set    Weight gain        Past Surgical History:   Procedure Laterality Date    BONE MARROW BIOPSY      BREAST SURGERY      enlargement with implants    CLOSED REDUCTION DISTAL FEMUR FRACTURE       COLONOSCOPY      COSMETIC SURGERY      DENTAL SURGERY      HIP ARTHROPLASTY Right 01/02/2020    Procedure: REVISION TOTAL HIP ARTHROPLASTY WITH REPAIR OF PARIPROSTHETIC FRACTURE - POSTERIOR APPROACH;  Surgeon: Dilan Pedersen MD;  Location: [facility];  Service: Orthopedics    SD AMPUTATION METATARSAL W/TOE SINGLE Left 04/07/2023    Procedure: AMPUTATION TOE 4th;  Surgeon: Conner Bartlett DPM;  Location: [facility];  Service: Podiatry    SD ARTHRP ACETBLR/PROX FEM PROSTC AGRFT/ALGRFT Right 12/11/2019    Procedure: ARTHROPLASTY HIP TOTAL ANTERIOR;  Surgeon: Dilan Pedersen MD;  [facility];  Service: Orthopedics    SD ESOPHAGOGASTRODUODENOSCOPY TRANSORAL DIAGNOSTIC N/A 05/11/2021    Procedure: ESOPHAGOGASTRODUODENOSCOPY (EGD);  Surgeon: David Cedeño MD;  [facility];  Service: General    SD LAPS RPR PARAESPHGL HRNA INCL FUNDPLSTY W/MESH N/A 05/11/2021    Procedure: REPAIR HERNIA PARAESOPHAGEAL  LAPAROSCOPIC;  Surgeon: David Cedeño MD;  [facility];  Service: General    SD UNLISTED PROCEDURE ESOPHAGUS N/A 05/11/2021    Procedure: FUNDOPLICATION TRANSORAL INCISIONLESS;  Surgeon: Brad Cadet MD;  Location: [facility];  Service: Gastroenterology    RHINOPLASTY      SINUS SURGERY      TOE AMPUTATION Left     2023 4th toe    TRANSORAL INCISIONLESS FUNDOPLICATION (TIF)  05/11/2021       Family History   Problem Relation Age of Onset    Uterine cancer Mother     Esophageal cancer Father     Colon cancer Maternal Grandmother      I have reviewed and agree with the history as documented.    E-Cigarette/Vaping    E-Cigarette Use Never User      E-Cigarette/Vaping Substances    Nicotine No     THC No     CBD No     Flavoring No     Other No     Unknown No      Social History     Tobacco Use    Smoking status: Never     Passive exposure: Past    Smokeless tobacco: Never   Vaping Use    Vaping status: Never Used   Substance Use Topics    Alcohol use: Not Currently    Drug use: Not Currently         Review of Systems   All other systems reviewed and are negative.      Physical Exam  ED Triage Vitals   Temperature Pulse Respirations Blood Pressure SpO2   05/21/24 0400 05/21/24 0358 05/21/24 0358 05/21/24 0358 05/21/24 0358   98.8 °F (37.1 °C) 93 20 157/94 96 %      Temp Source Heart Rate Source Patient Position - Orthostatic VS BP Location FiO2 (%)   05/21/24 0400 05/21/24 0358 05/21/24 0358 05/21/24 0358 --   Oral Monitor Lying Left arm       Pain Score       --                    Orthostatic Vital Signs  Vitals:    05/21/24 0358 05/21/24 0430 05/21/24 0500 05/21/24 0600   BP: 157/94 147/91 158/95 157/90   Pulse: 93 92 94 90   Patient Position - Orthostatic VS: Lying          Physical Exam  Vitals and nursing note reviewed.   Constitutional:       General: She is not in acute distress.     Appearance: She is well-developed.   HENT:      Head: Normocephalic and atraumatic.   Eyes:      Conjunctiva/sclera: Conjunctivae normal.   Cardiovascular:      Rate and Rhythm: Normal rate and regular rhythm.      Heart sounds: No murmur heard.  Pulmonary:      Effort: Pulmonary effort is normal. No respiratory distress.      Breath sounds: Normal breath sounds.   Abdominal:      Palpations: Abdomen is soft.      Tenderness: There is abdominal tenderness in the right lower quadrant and epigastric area. There is no right CVA tenderness, left CVA tenderness, guarding or rebound.   Musculoskeletal:         General: No swelling.      Cervical back: Neck supple.   Skin:     General: Skin is warm and dry.      Capillary Refill: Capillary refill takes less than 2 seconds.   Neurological:      Mental Status: She is alert.   Psychiatric:         Mood and Affect: Mood normal.         ED Medications  Medications   potassium chloride 20 mEq IVPB (premix) (20 mEq Intravenous New Bag 5/21/24 2726)   Famotidine (PF) (PEPCID) injection 20 mg (20 mg Intravenous Given 5/21/24 2147)   aluminum-magnesium hydroxide-simethicone (MAALOX) oral  suspension 30 mL (30 mL Oral Given 5/21/24 0423)   dicyclomine (BENTYL) tablet 20 mg (20 mg Oral Given 5/21/24 0423)   potassium chloride (Klor-Con M20) CR tablet 40 mEq (40 mEq Oral Given 5/21/24 0532)   ondansetron (ZOFRAN) injection 4 mg (4 mg Intravenous Given 5/21/24 0532)   iohexol (OMNIPAQUE) 350 MG/ML injection (MULTI-DOSE) 85 mL (85 mL Intravenous Given 5/21/24 0527)       Diagnostic Studies  Results Reviewed       Procedure Component Value Units Date/Time    HS Troponin I 4hr [[id number]]     Lab Status: No result Specimen: Blood     HS Troponin I 2hr [[id number]] Collected: 05/21/24 0607    Lab Status: In process Specimen: Blood from Hand, Left Updated: 05/21/24 0613    Urine Microscopic [[id number]] Collected: 05/21/24 0539    Lab Status: Final result Specimen: Urine, Clean Catch Updated: 05/21/24 0607     RBC, UA None Seen /hpf      WBC, UA 1-2 /hpf      Epithelial Cells Occasional /hpf      Bacteria, UA None Seen /hpf      Renal Epithelial Cells Present     Transitional Epithelial Cells Present    UA w Reflex to Microscopic w Reflex to Culture [[id number]]  (Abnormal) Collected: 05/21/24 0539    Lab Status: Final result Specimen: Urine, Clean Catch Updated: 05/21/24 0557     Color, UA Colorless     Clarity, UA Clear     Specific Gravity, UA 1.015     pH, UA 6.5     Leukocytes, UA Small     Nitrite, UA Negative     Protein, UA Negative mg/dl      Glucose, UA Negative mg/dl      Ketones, UA Negative mg/dl      Urobilinogen, UA <2.0 mg/dl      Bilirubin, UA Negative     Occult Blood, UA Negative    HS Troponin 0hr (reflex protocol) [[id number]]  (Normal) Collected: 05/21/24 0423    Lab Status: Final result Specimen: Blood from Arm, Right Updated: 05/21/24 0454     hs TnI 0hr 4 ng/L     Lipase [[id number]]  (Normal) Collected: 05/21/24 0423    Lab Status: Final result Specimen: Blood from Arm, Right Updated: 05/21/24 0453     Lipase 24 u/L     Comprehensive metabolic panel [[id number]]  (Abnormal) Collected:  05/21/24 0423    Lab Status: Final result Specimen: Blood from Arm, Right Updated: 05/21/24 0453     Sodium 131 mmol/L      Potassium 2.9 mmol/L      Chloride 91 mmol/L      CO2 30 mmol/L      ANION GAP 10 mmol/L      BUN 6 mg/dL      Creatinine 0.55 mg/dL      Glucose 92 mg/dL      Calcium 10.2 mg/dL      AST 13 U/L      ALT 14 U/L      Alkaline Phosphatase 112 U/L      Total Protein 7.1 g/dL      Albumin 4.3 g/dL      Total Bilirubin 0.30 mg/dL      eGFR 100 ml/min/1.73sq m     Narrative:      National Kid[employer] Disease Foundation guidelines for Chronic Kidney Disease (CKD):     Stage 1 with normal or high GFR (GFR > 90 mL/min/1.73 square meters)    Stage 2 Mild CKD (GFR = 60-89 mL/min/1.73 square meters)    Stage 3A Moderate CKD (GFR = 45-59 mL/min/1.73 square meters)    Stage 3B Moderate CKD (GFR = 30-44 mL/min/1.73 square meters)    Stage 4 Severe CKD (GFR = 15-29 mL/min/1.73 square meters)    Stage 5 End Stage CKD (GFR <15 mL/min/1.73 square meters)  Note: GFR calculation is accurate only with a steady state creatinine    CBC and differential [[id number]]  (Abnormal) Collected: 05/21/24 0423    Lab Status: Final result Specimen: Blood from Arm, Right Updated: 05/21/24 0432     WBC 5.36 Thousand/uL      RBC 4.98 Million/uL      Hemoglobin 13.5 g/dL      Hematocrit 40.5 %      MCV 81 fL      MCH 27.1 pg      MCHC 33.3 g/dL      RDW 13.7 %      MPV 7.9 fL      Platelets 314 Thousands/uL      nRBC 0 /100 WBCs      Segmented % 75 %      Immature Grans % 1 %      Lymphocytes % 10 %      Monocytes % 9 %      Eosinophils Relative 4 %      Basophils Relative 1 %      Absolute Neutrophils 4.12 Thousands/µL      Absolute Immature Grans 0.03 Thousand/uL      Absolute Lymphocytes 0.53 Thousands/µL      Absolute Monocytes 0.46 Thousand/µL      Eosinophils Absolute 0.19 Thousand/µL      Basophils Absolute 0.03 Thousands/µL                    CT abdomen pelvis w contrast   Final Result by Ángel Horn MD (05/21  "0601)      Nonobstructive bowel suggestive of diarrheal illness.      No other evidence of acute abdominopelvic process.      Stable small hiatal hernia and thickening of the distal esophagus attributed to chronic esophagitis.      Mild compression fracture of L3, presumably subacute new since 2/6/2024. No retropulsion. Correlate with clinical findings.      Additional chronic findings and negatives as above.      The study was marked in EPIC for immediate notification.                  Workstation performed: [id number]               Procedures  ECG 12 Lead Documentation Only    Date/Time: 5/21/2024 5:13 AM    Performed by: Orlando Neal MD  Authorized by: Orlando Neal MD    Patient location:  ED  Interpretation:     Interpretation: normal    Rate:     ECG rate assessment: normal    Rhythm:     Rhythm: sinus rhythm    Ectopy:     Ectopy: none    QRS:     QRS axis:  Normal    QRS intervals:  Normal  Conduction:     Conduction: normal    ST segments:     ST segments:  Normal  T waves:     T waves: normal          ED Course         CRAFFT      Flowsheet Row Most Recent Value   CRAFFT Initial Screen: During the past 12 months, did you:    1. Drink any alcohol (more than a few sips)?  No Filed at: 05/21/2024 0407   2. Smoke any marijuana or hashish No Filed at: 05/21/2024 0407   3. Use anything else to get high? (\"anything else\" includes illegal drugs, over the counter and prescription drugs, and things that you sniff or 'owens')? No Filed at: 05/21/2024 0407                            SBIRT 22yo+      Flowsheet Row Most Recent Value   Initial Alcohol Screen: US AUDIT-C     1. How often do you have a drink containing alcohol? 0 Filed at: 05/21/2024 0407   2. How many drinks containing alcohol do you have on a typical day you are drinking?  0 Filed at: 05/21/2024 0407   3a. Male UNDER 65: How often do you have five or more drinks on one occasion? 0 Filed at: 05/21/2024 0407   3b. FEMALE Any Age, or MALE 65+: " How often do you have 4 or more drinks on one occassion? 0 Filed at: 05/21/2024 0407   Audit-C Score 0 Filed at: 05/21/2024 0407   MIA: How many times in the past year have you...    Used an illegal drug or used a prescription medication for non-medical reasons? Never Filed at: 05/21/2024 0407                  Medical Decision Making  Patient is 63F with PMH of GERD/roberto carlos's who presents to the ED with abdominal pain.    Vital signs stable. On exam epigastric and right lower quadrant tenderness.    History and physical exam most consistent with GERD. However, differential diagnosis included but not limited to intra-abdominal process, pancreatitis, cholecystitis, ACS. Plan cardiac and abdominal labs, CT imaging, pain control    View ED course above for further discussion on patient workup.    All labs reviewed and utilized in the medical decision making process  All radiology studies independently viewed by me and interpreted by the radiologist.  I reviewed all testing with the patient.     Upon re-evaluation patient had mild improvement with GI cocktail.  CT scan consistent with diarrheal illness and compression fracture.  Patient updated on these findings.  Patient expressed concern of her Schreiber's esophagus and that her pain might be related to this.  Advised patient to follow-up with gastroenterology.  Patient could benefit from repeat endoscopy.    Return precautions discussed, all questions answered prior to discharge      Amount and/or Complexity of Data Reviewed  Labs: ordered.  Radiology: ordered.    Risk  OTC drugs.  Prescription drug management.          Disposition  Final diagnoses:   Hypokalemia   Diarrhea   Abdominal pain     Time reflects when diagnosis was documented in both MDM as applicable and the Disposition within this note       Time User Action Codes Description Comment    5/21/2024  4:53 AM Oscar Kim Add [E87.6] Hypokalemia     5/21/2024  6:05 AM Orlando Neal Add [R19.7] Diarrhea      5/21/2024  6:05 AM Orlando Neal Add [R10.9] Abdominal pain           ED Disposition       ED Disposition   Discharge    Condition   Stable    Date/Time   Tue May 21, 2024 0605    Comment   Irene Plascencia discharge to home/self care.                   Follow-up Information       Follow up With Specialties Details Why Contact Info Additional Information    Raheem Cain MD Internal Medicine   [address] [place] [id number]  [phone]       [facility] Physical Therapy Call   [phone]            Patient's Medications   Discharge Prescriptions    DICYCLOMINE (BENTYL) 20 MG TABLET    Take 1 tablet (20 mg total) by mouth 2 (two) times a day       Start Date: 5/21/2024 End Date: --       Order Dose: 20 mg       Quantity: 20 tablet    Refills: 0         PDMP Review         Value Time User    PDMP Reviewed  Yes 4/23/2024  9:28 AM Raheem Cain MD             ED Provider  Attending physically available and evaluated Irene A Susankemal. I managed the patient along with the ED Attending.    Electronically Signed by           Orlando Neal MD  05/21/24 0639

## 2024-05-21 NOTE — TELEPHONE ENCOUNTER
Spoke with patient.  Informed her of providers message.  Patient verbalized understanding. She stated she could not find the diet as recommended.  I informed the patient I would send her a new copy today.  She asked for it to be mailed to her home and I informed her I would mail it today.

## 2024-05-21 NOTE — ED ATTENDING ATTESTATION
5/21/2024  I, Oscar Kim MD, saw and evaluated the patient. I have discussed the patient with the resident/non-physician practitioner and agree with the resident's/non-physician practitioner's findings, Plan of Care, and MDM as documented in the resident's/non-physician practitioner's note, except where noted. All available labs and Radiology studies were reviewed.  I was present for key portions of any procedure(s) performed by the resident/non-physician practitioner and I was immediately available to provide assistance.       At this point I agree with the current assessment done in the Emergency Department.  I have conducted an independent evaluation of this patient a history and physical is as follows:    63-year-old female with a history of GERD and Schreiber's esophagus presents to the emergency department for evaluation of epigastric abdominal pain that started this evening.  Pain is constant, nonradiating, worse following meals.  She also has additional pain in her right lower quadrant.  No vomiting.  Has been having some diarrhea.  No fevers or chills.    On exam, patient was comfortably in bed in no acute distress, head is normocephalic atraumatic, pupils equal round reactive, neck is supple without meningismus signs, heart is regular rate and rhythm with intact distal pulses, no increased work of breathing, respiratory distress, or stridor.  Abdomen is soft with epigastric and right lower quadrant tenderness.  No rebound or guarding.    Differential diagnosis includes but is not limited to gastroenteritis, gastritis, peptic ulcer disease, Schreiber's esophagus, pancreatitis.  Plan to check abdominal labs, CT scan abdomen and pelvis.  Will treat symptomatically and reassess.    Labs remarkable for hypokalemia and mild hyponatremia, no hyponatremia appears to be chronic.  Urinalysis negative for signs of infection.  CT scan consistent with diarrheal illness.  Patient treated with oral potassium.  Stable  for discharge with outpatient gastroenterology follow-up.    ED Course         Critical Care Time  Procedures

## 2024-05-21 NOTE — TELEPHONE ENCOUNTER
Regarding: abd pain  ----- Message from Sigifredo CORDERO sent at 5/21/2024 12:56 PM EDT -----  Pt called with abd pain still. Was seen in er 5/21/2024. Pt asking for call.

## 2024-05-21 NOTE — TELEPHONE ENCOUNTER
I agree with your recommendations.  She has severe gastroparesis with her last gastric emptying scan showing 29% rate of emptying at 4 hours.  Due to worsening symptoms currently, recommend she follow gastroparesis diet very closely.  She can even supplement and meal for liquid meal options such as Ensure or boost in the meantime while symptoms are flared.  Thank you

## 2024-05-21 NOTE — DISCHARGE INSTRUCTIONS
You have been evaluated in the emergency department for abdominal pain and diarrhea.  Your evaluation is consistent with a diarrheal illness, most likely viral.  Your evaluation also incidentally found a mild compression fracture in your spine.  There is no acute therapy needed for this.  Please call the comprehensive spine at the number included if you have significant back pain.     Take the prescribed Imodium as needed for diarrhea.  Continue to take Pepcid for your esophagitis.  Take Bentyl as needed for stomach discomfort.  Follow-up with your gastroenterologist.

## 2024-05-23 ENCOUNTER — PATIENT OUTREACH (OUTPATIENT)
Dept: INTERNAL MEDICINE CLINIC | Facility: CLINIC | Age: 64
End: 2024-05-23

## 2024-05-23 NOTE — LETTER
[address] [address]    Re: Unable to Reach   5/23/2024       Dear Irene,    I am a [facility]  and wanted to be certain you had information to contact me should you desire assistance with or have questions about non-medical aspects of your care such as [but not limited to] medical insurance, housing, transportation, material needs, or emergency needs, as I was unable to reach you.  If I do not have an answer I will assist you in finding the appropriate agency or individual who can help.      Please feel free to contact me at [phone]. Thank You.    Sincerely,         Ayesha Carrera

## 2024-05-23 NOTE — PROGRESS NOTES
2nd outreach attempt to patient to check status and further assess needs.  No answer and unable to leave voicemail as mailbox is full.    Will send Unable to Reach letter to patient via [employer] and close case at this time.

## 2024-05-24 DIAGNOSIS — F41.9 ANXIETY: ICD-10-CM

## 2024-05-24 RX ORDER — LORAZEPAM 2 MG/1
2 TABLET ORAL EVERY 4 HOURS PRN
Qty: 180 TABLET | Refills: 1 | Status: SHIPPED | OUTPATIENT
Start: 2024-05-24

## 2024-05-24 NOTE — TELEPHONE ENCOUNTER
Pt called again asking if the script can be refilled today, she is due to run out tomorrow. Please advise

## 2024-05-26 PROBLEM — Z00.00 MEDICARE ANNUAL WELLNESS VISIT, SUBSEQUENT: Status: RESOLVED | Noted: 2020-04-29 | Resolved: 2024-05-26

## 2024-05-30 ENCOUNTER — TELEPHONE (OUTPATIENT)
Age: 64
End: 2024-05-30

## 2024-05-30 NOTE — TELEPHONE ENCOUNTER
Pt called in stating she would like Dr. Cain to give her a call so they can discuss her medications. She says she doesn't have a car to get to the office and she didn't want a virtual appt for tomorrow at 3pm.

## 2024-05-31 ENCOUNTER — TELEMEDICINE (OUTPATIENT)
Dept: INTERNAL MEDICINE CLINIC | Facility: CLINIC | Age: 64
End: 2024-05-31
Payer: COMMERCIAL

## 2024-05-31 DIAGNOSIS — Z00.00 HEALTHCARE MAINTENANCE: ICD-10-CM

## 2024-05-31 DIAGNOSIS — J01.00 ACUTE NON-RECURRENT MAXILLARY SINUSITIS: Primary | ICD-10-CM

## 2024-05-31 PROCEDURE — G2211 COMPLEX E/M VISIT ADD ON: HCPCS | Performed by: INTERNAL MEDICINE

## 2024-05-31 PROCEDURE — 99213 OFFICE O/P EST LOW 20 MIN: CPT | Performed by: INTERNAL MEDICINE

## 2024-05-31 RX ORDER — AZITHROMYCIN 250 MG/1
TABLET, FILM COATED ORAL
Qty: 6 TABLET | Refills: 0 | Status: SHIPPED | OUTPATIENT
Start: 2024-05-31 | End: 2024-06-05

## 2024-05-31 NOTE — PROGRESS NOTES
Virtual Regular Visit    Verification of patient location:    Patient is located at Home in the following state in which I hold an active license [place]      Assessment/Plan:    Problem List Items Addressed This Visit        Respiratory    Acute non-recurrent maxillary sinusitis - Primary     Will treat with azithromycin.  Patient can also use over-the-counter Flonase, follow-up if not improving         Relevant Medications    azithromycin (Zithromax) 250 mg tablet   Other Visit Diagnoses     Healthcare maintenance        Relevant Orders    Ambulatory Referral to Obstetrics / Gynecology               Reason for visit is   Chief Complaint   Patient presents with   • Virtual Regular Visit          Encounter provider Raheem Cain MD      Recent Visits  No visits were found meeting these conditions.  Showing recent visits within past 7 days and meeting all other requirements  Today's Visits  Date Type Provider Dept   05/31/24 Telemedicine Raheem Cain MD  [facility]   Showing today's visits and meeting all other requirements  Future Appointments  No visits were found meeting these conditions.  Showing future appointments within next 150 days and meeting all other requirements       The patient was identified by name and date of birth. Irene Plascencia was informed that this is a telemedicine visit and that the visit is being conducted through the Epic Embedded platform. She agrees to proceed..  My office door was closed. No one else was in the room.  She acknowledged consent and understanding of privacy and security of the video platform. The patient has agreed to participate and understands they can discontinue the visit at any time.    Patient is aware this is a billable service.     Subjective  Irene Plascencia is a 63 y.o. female  .      Pt calling with cold symptoms, low grade temp, runny nose, no cough, no sore throat         Past Medical History:   Diagnosis Date   • Anemia    • Anxiety     • Arthritis    • Back pain at L4-L5 level    • Schreiber esophagus    • Bulimia    • Colon polyp    • Disease of thyroid gland     hypothyroid   • GERD (gastroesophageal reflux disease)    • Hearing loss in left ear    • History of transfusion    • Hyperlipidemia    • Hypertension    • Hypothyroidism    • Leukopenia    • NMS (neuroleptic malignant syndrome) 01/03/2020   • Pneumonia    • RA (rheumatoid arthritis) (HCC)     in feet   • Sciatica    • Seizure (HCC)     due to medication mix up 8/2018 only one historically   • Skin spots, red     lower right arm - poss from cat- no s/s infection   • Synovial cyst of lumbar spine    • Vertigo    • Wears dentures     full set   • Weight gain        Past Surgical History:   Procedure Laterality Date   • BONE MARROW BIOPSY     • BREAST SURGERY      enlargement with implants   • CLOSED REDUCTION DISTAL FEMUR FRACTURE     • COLONOSCOPY     • COSMETIC SURGERY     • DENTAL SURGERY     • HIP ARTHROPLASTY Right 01/02/2020    Procedure: REVISION TOTAL HIP ARTHROPLASTY WITH REPAIR OF PARIPROSTHETIC FRACTURE - POSTERIOR APPROACH;  Surgeon: Dilan Pedersen MD;  [facility];  Service: Orthopedics   • MS AMPUTATION METATARSAL W/TOE SINGLE Left 04/07/2023    Procedure: AMPUTATION TOE 4th;  Surgeon: Conner Bartlett DPM;  Location:  MAIN OR;  Service: Podiatry   • MS ARTHRP ACETBLR/PROX FEM PROSTC AGRFT/ALGRFT Right 12/11/2019    Procedure: ARTHROPLASTY HIP TOTAL ANTERIOR;  Surgeon: Dilan Pedersen MD;  Location:  MAIN OR;  Service: Orthopedics   • MS ESOPHAGOGASTRODUODENOSCOPY TRANSORAL DIAGNOSTIC N/A 05/11/2021    Procedure: ESOPHAGOGASTRODUODENOSCOPY (EGD);  Surgeon: David Cedeño MD;  [facility];  Service: General   • MS LAPS RPR PARAESPHGL HRNA INCL FUNDPLSTY W/MESH N/A 05/11/2021    Procedure: REPAIR HERNIA PARAESOPHAGEAL  LAPAROSCOPIC;  Surgeon: David Cedeño MD;  [facility];  Service: General   • MS UNLISTED PROCEDURE ESOPHAGUS N/A 05/11/2021     Procedure: FUNDOPLICATION TRANSORAL INCISIONLESS;  Surgeon: Brad Cadet MD;  Location[facility];  Service: Gastroenterology   • RHINOPLASTY     • SINUS SURGERY     • TOE AMPUTATION Left     2023 4th toe   • TRANSORAL INCISIONLESS FUNDOPLICATION (TIF)  05/11/2021       Current Outpatient Medications   Medication Sig Dispense Refill   • amLODIPine (NORVASC) 5 mg tablet TAKE 1 TABLET (5 MG TOTAL) BY MOUTH DAILY. 90 tablet 3   • azithromycin (Zithromax) 250 mg tablet Take 2 tablets (500 mg total) by mouth daily for 1 day, THEN 1 tablet (250 mg total) daily for 4 days. 6 tablet 0   • benzonatate (TESSALON PERLES) 100 mg capsule Take 1 capsule (100 mg total) by mouth 3 (three) times a day as needed for cough 30 capsule 5   • celecoxib (CeleBREX) 200 mg capsule Take 1 capsule (200 mg total) by mouth daily 30 capsule 5   • dicyclomine (BENTYL) 20 mg tablet Take 1 tablet (20 mg total) by mouth 2 (two) times a day 20 tablet 0   • famotidine (PEPCID) 20 mg tablet Take 1 tablet (20 mg total) by mouth daily 14 tablet 0   • levothyroxine 25 mcg tablet TAKE 1 TABLET BY MOUTH EVERY DAY 90 tablet 3   • LORazepam (ATIVAN) 2 mg tablet Take 1 tablet (2 mg total) by mouth every 4 (four) hours as needed for anxiety 180 tablet 1   • ondansetron (ZOFRAN) 4 mg tablet Take 1 tablet (4 mg total) by mouth every 6 (six) hours 12 tablet 5   • pantoprazole (PROTONIX) 40 mg tablet TAKE 1 TABLET BY MOUTH TWICE A  tablet 1   • PARoxetine (PAXIL) 30 mg tablet TAKE 2 TABLETS (60 MG TOTAL) BY MOUTH IN THE MORNING 180 tablet 1   • QUEtiapine (SEROquel XR) 400 mg 24 hr tablet TAKE 1 TABLET (400 MG TOTAL) BY MOUTH DAILY AT BEDTIME 90 tablet 3   • sodium chloride 1 g tablet Take 2 tablets (2 g total) by mouth 2 (two) times a day 120 tablet 0   • sucralfate (CARAFATE) 1 g tablet TAKE 1 TABLET (1 G TOTAL) BY MOUTH 3 (THREE) TIMES A DAY WITH MEALS 270 tablet 1   • triamcinolone (KENALOG) 0.1 % cream APPLY TOPICALLY 2 TIMES A DAY AS NEEDED FOR RASH.  160 g 5   • ziprasidone (GEODON) 80 mg capsule TAKE 1 CAPSULE BY MOUTH EVERY DAY 90 capsule 3   • [facility] Vitamin B-12 1000 MCG tablet TAKE 1 TABLET BY MOUTH EVERY DAY (Patient not taking: Reported on 5/16/2024) 90 tablet 1   • EPINEPHrine (EPIPEN) 0.3 mg/0.3 mL SOAJ Inject 0.3 mL (0.3 mg total) into a muscle once for 1 dose 0.6 mL 0   • ferrous sulfate 325 (65 Fe) mg tablet Take 1 tablet (325 mg total) by mouth daily with breakfast (Patient not taking: Reported on 5/16/2024) 60 tablet 3   • folic acid (FOLVITE) 400 mcg tablet Take 1 tablet (400 mcg total) by mouth daily (Patient not taking: Reported on 5/16/2024) 30 tablet 3     No current facility-administered medications for this visit.        Allergies   Allergen Reactions   • Latex Anaphylaxis, Rash and Tongue Swelling     Band aids, adhesives wears normal underwear, can eat bananas  USE PAPER TAPE   • Risperdal [Risperidone] Shortness Of Breath     Rapid heart beat, SOB   • Zyprexa [Olanzapine] Shortness Of Breath     Rapid heartbeat       Review of Systems   Constitutional:  Positive for fever.   HENT:  Positive for congestion and rhinorrhea.        Video Exam    There were no vitals filed for this visit.    Physical Exam  Constitutional:       General: She is not in acute distress.  Pulmonary:      Effort: Pulmonary effort is normal. No respiratory distress.   Neurological:      Mental Status: She is alert.          Visit Time  Total Visit Duration: 20         Airway patent, Nasal mucosa clear. Mouth with normal mucosa. Throat has no vesicles, no oropharyngeal exudates and uvula is midline.

## 2024-05-31 NOTE — ASSESSMENT & PLAN NOTE
Will treat with azithromycin.  Patient can also use over-the-counter Flonase, follow-up if not improving

## 2024-05-31 NOTE — PATIENT INSTRUCTIONS
Problem List Items Addressed This Visit          Respiratory    Acute non-recurrent maxillary sinusitis - Primary     Will treat with azithromycin.  Patient can also use over-the-counter Flonase, follow-up if not improving         Relevant Medications    azithromycin (Zithromax) 250 mg tablet     Other Visit Diagnoses       Healthcare maintenance        Relevant Orders    Ambulatory Referral to Obstetrics / Gynecology

## 2024-06-03 NOTE — ASSESSMENT & PLAN NOTE
06/03/24 1000   Daily Treatment   Affect Appropriate   Mood Normal   Thought Process Appropriate   Psychosis None   Sleep Report Good   Treatment Plan Continue treatment plan   Patient Response Appropriate feedback   RN Monitoring of Pain, Safety, and Relapse   Safety Concerns None   Types of Safety Concerns none   Physical Concerns none   Pain Concerns none   Use of Street Drugs or Alcohol No   Taking Medications as Prescribed Yes   Patient Response Appropriate feedback        Continue meds and follow-up Psychiatry

## 2024-06-05 ENCOUNTER — PREP FOR PROCEDURE (OUTPATIENT)
Dept: GASTROENTEROLOGY | Facility: CLINIC | Age: 64
End: 2024-06-05

## 2024-06-05 ENCOUNTER — OFFICE VISIT (OUTPATIENT)
Dept: GASTROENTEROLOGY | Facility: CLINIC | Age: 64
End: 2024-06-05
Payer: COMMERCIAL

## 2024-06-05 ENCOUNTER — TELEPHONE (OUTPATIENT)
Dept: GASTROENTEROLOGY | Facility: CLINIC | Age: 64
End: 2024-06-05

## 2024-06-05 VITALS
HEART RATE: 81 BPM | HEIGHT: 62 IN | WEIGHT: 181 LBS | SYSTOLIC BLOOD PRESSURE: 135 MMHG | DIASTOLIC BLOOD PRESSURE: 90 MMHG | BODY MASS INDEX: 33.31 KG/M2

## 2024-06-05 DIAGNOSIS — R10.13 EPIGASTRIC PAIN: ICD-10-CM

## 2024-06-05 DIAGNOSIS — R12 HEARTBURN: ICD-10-CM

## 2024-06-05 DIAGNOSIS — K44.9 HIATAL HERNIA WITH GERD WITHOUT ESOPHAGITIS: Primary | ICD-10-CM

## 2024-06-05 DIAGNOSIS — K21.9 HIATAL HERNIA WITH GERD WITHOUT ESOPHAGITIS: Primary | ICD-10-CM

## 2024-06-05 DIAGNOSIS — K31.84 GASTROPARESIS: ICD-10-CM

## 2024-06-05 PROCEDURE — G2211 COMPLEX E/M VISIT ADD ON: HCPCS | Performed by: NURSE PRACTITIONER

## 2024-06-05 PROCEDURE — 99214 OFFICE O/P EST MOD 30 MIN: CPT | Performed by: NURSE PRACTITIONER

## 2024-06-05 RX ORDER — FAMOTIDINE 40 MG/1
40 TABLET, FILM COATED ORAL 2 TIMES DAILY
Qty: 60 TABLET | Refills: 5 | Status: SHIPPED | OUTPATIENT
Start: 2024-06-05 | End: 2024-07-05

## 2024-06-05 NOTE — PROGRESS NOTES
[facility] - Outpatient Follow-up Note  Irene Plascencia 63 y.o. female MRN: [id number]  Encounter: [id number]          ASSESSMENT AND PLAN:      1. Epigastric pain  2. Hiatal hernia with GERD without esophagitis  3. Heartburn  4. Gastroparesis  Patient reports she was in the hospital 1 month ago at that time she was given pills she was unfamiliar with and then proceeded to develop abdominal pain associated with nausea and vomiting.   Patient currently denies any further episodes of nausea and vomiting but reports pain in epigastric area has been ongoing.Patient did go to the emergency room on 5/21 for further evaluation of abdominal pain CT of the abdomen and pelvis was done at that time Showed nonobstructive bowel suggestive of diarrheal illness.  No other evidence of acute abdominal pelvic process.  Stable small hiatal hernia and thickening of distal esophagus attributed to chronic esophagitis.  Patient does report that she drinks frequent diet Pepsi throughout the day.    - Ambulatory Referral to Gastroenterology  -Reinforced strict adherence with antireflux diet and measures  -Follow gastroparesis diet low fat low fiber 6 small meals a day  - famotidine (PEPCID) 40 MG tablet; Take 1 tablet (40 mg total) by mouth 2 (two) times a day  Dispense: 60 tablet; Refill: 5  -Continue pantoprazole 40 mg twice a day  -Continue Carafate 1 g 30 minutes prior to meals 3 times a day  May take dicyclomine as needed for abdominal pain  -If EGD unrevealing of cause would recommend referral to [facility] for possible gastric pacemaker or [facility] for gastroparesis  -Unable to prescribe Reglan or Gimoti due to interactions with psychiatric meds  - EGD; Scheduled for EGD.  Prep and procedure explained to patient in detail.  Further recommendations pending results of EGD.    -I obtained informed consent from the patient. The risks/benefits/alternatives of the procedure were discussed with the patient.  Risks included, but not limited to, infection, bleeding, perforation, injury to organs in the abdomen, missed lesion and incomplete procedure were discussed. Patient was agreeable.     5.Hx colon polyps  6. Family hx colon cancer   Colonoscopy done 9/20/2020 showed polyp removed from transverse colon.  Redundant colon.  No source of anemia. Biopsy report not available.    Positive family history colon cancer maternal grandmother.   -Patient due for surveillance colonoscopy 9/2025.    Follow up after procedure  ______________________________________________________________________    SUBJECTIVE:  Irene Plascencia is a pleasant 62-year-old female who presents to office for follow-up.  Patient reports she was in the hospital 1 month ago at that time she was given pills she was unfamiliar with and then proceeded to develop abdominal pain associated with nausea and vomiting.   Patient currently denies any further episodes of nausea and vomiting but reports pain in epigastric area has been ongoing.Patient did go to the emergency room on 5/21 for further evaluation of abdominal pain CT of the abdomen and pelvis was done at that time Showed nonobstructive bowel suggestive of diarrheal illness.  No other evidence of acute abdominal pelvic process.  Stable small hiatal hernia and thickening of distal esophagus attributed to chronic esophagitis.  Mild compression fracture of L3 presumably subacute new since 2/6/2024.  Patient reports chronic acid reflux and heartburn despite being on multiple medications and having prior TIF procedure done.  Patient does report drinking diet Pepsi frequently throughout the day.  Patient denies blood in stool, blood from rectal area, or black tarry stool.  Patient reports bowel patterns are regular no diarrhea or constipation.  Abdomen exam positive for tenderness in epigastric area.  Patient does have a known history of gastroparesis.  Gastric emptying study done1/26/2023 showed severely  delayed rate of gastric emptying.  Due to patient's psychiatric meds for schizophrenia she is not a candidate for Reglan  or Gimoti.  Patient does report following gastroparesis diet..    EGD done 12/12/2022 showed recurrence of hiatal hernia less than 3 cm, history of TIF.  LA grade B reflux esophagitis with possible Schreiber's esophagus.  Normal stomach and duodenum.  Previously seen lower esophageal ulcer well-healed.  Biopsy showed lower esophagus no intestinal metaplasia.   single portion of acutely inflamed pyogenic granuloma consistent with reaction to ulcer.  Colonoscopy done 9/20/2020 showed polyp removed from transverse colon.  Redundant colon.  No source of anemia. Biopsy report not available.  Patient due for repeat colonoscopy 9/2025.  Positive family history colon cancer maternal grandmother.     REVIEW OF SYSTEMS IS OTHERWISE NEGATIVE.      Historical Information   Past Medical History:   Diagnosis Date    Anemia     Anxiety     Arthritis     Back pain at L4-L5 level     Schreiber esophagus     Bulimia     Colon polyp     Disease of thyroid gland     hypothyroid    GERD (gastroesophageal reflux disease)     Hearing loss in left ear     History of transfusion     Hyperlipidemia     Hypertension     Hypothyroidism     Leukopenia     NMS (neuroleptic malignant syndrome) 01/03/2020    Pneumonia     RA (rheumatoid arthritis) (HCC)     in feet    Sciatica     Seizure (HCC)     due to medication mix up 8/2018 only one historically    Skin spots, red     lower right arm - poss from cat- no s/s infection    Synovial cyst of lumbar spine     Vertigo     Wears dentures     full set    Weight gain      Past Surgical History:   Procedure Laterality Date    BONE MARROW BIOPSY      BREAST SURGERY      enlargement with implants    CLOSED REDUCTION DISTAL FEMUR FRACTURE      COLONOSCOPY      COSMETIC SURGERY      DENTAL SURGERY      HIP ARTHROPLASTY Right 01/02/2020    Procedure: REVISION TOTAL HIP ARTHROPLASTY  WITH REPAIR OF PARIPROSTHETIC FRACTURE - POSTERIOR APPROACH;  Surgeon: Dilan Pedersen MD;  [facility];  Service: Orthopedics    KS AMPUTATION METATARSAL W/TOE SINGLE Left 04/07/2023    Procedure: AMPUTATION TOE 4th;  Surgeon: Conner Bartlett DPM;  [facility];  Service: Podiatry    KS ARTHRP ACETBLR/PROX FEM PROSTC AGRFT/ALGRFT Right 12/11/2019    Procedure: ARTHROPLASTY HIP TOTAL ANTERIOR;  Surgeon: Dilan Pedersen MD;  [facility];  Service: Orthopedics    KS ESOPHAGOGASTRODUODENOSCOPY TRANSORAL DIAGNOSTIC N/A 05/11/2021    Procedure: ESOPHAGOGASTRODUODENOSCOPY (EGD);  Surgeon: David Cedeño MD;  [facility];  Service: General    KS LAPS RPR PARAESPHGL HRNA INCL FUNDPLSTY W/MESH N/A 05/11/2021    Procedure: REPAIR HERNIA PARAESOPHAGEAL  LAPAROSCOPIC;  Surgeon: David Cedeño MD;  [facility];  Service: General    KS UNLISTED PROCEDURE ESOPHAGUS N/A 05/11/2021    Procedure: FUNDOPLICATION TRANSORAL INCISIONLESS;  Surgeon: Brad Cadet MD;  [facility];  Service: Gastroenterology    RHINOPLASTY      SINUS SURGERY      TOE AMPUTATION Left     2023 4th toe    TRANSORAL INCISIONLESS FUNDOPLICATION (TIF)  05/11/2021     Social History   Social History     Substance and Sexual Activity   Alcohol Use Not Currently     Social History     Substance and Sexual Activity   Drug Use Not Currently     Social History     Tobacco Use   Smoking Status Never    Passive exposure: Past   Smokeless Tobacco Never     Family History   Problem Relation Age of Onset    Uterine cancer Mother     Esophageal cancer Father     Colon cancer Maternal Grandmother        Meds/Allergies       Current Outpatient Medications:     amLODIPine (NORVASC) 5 mg tablet    azithromycin (Zithromax) 250 mg tablet    benzonatate (TESSALON PERLES) 100 mg capsule    celecoxib (CeleBREX) 200 mg capsule    dicyclomine (BENTYL) 20 mg tablet    famotidine (PEPCID) 40 MG tablet    levothyroxine 25 mcg  "tablet    LORazepam (ATIVAN) 2 mg tablet    ondansetron (ZOFRAN) 4 mg tablet    pantoprazole (PROTONIX) 40 mg tablet    PARoxetine (PAXIL) 30 mg tablet    QUEtiapine (SEROquel XR) 400 mg 24 hr tablet    sodium chloride 1 g tablet    sucralfate (CARAFATE) 1 g tablet    triamcinolone (KENALOG) 0.1 % cream    ziprasidone (GEODON) 80 mg capsule    [facility] Vitamin B-12 1000 MCG tablet    EPINEPHrine (EPIPEN) 0.3 mg/0.3 mL SOAJ    ferrous sulfate 325 (65 Fe) mg tablet    folic acid (FOLVITE) 400 mcg tablet    Allergies   Allergen Reactions    Latex Anaphylaxis, Rash and Tongue Swelling     Band aids, adhesives wears normal underwear, can eat bananas  USE PAPER TAPE    Risperdal [Risperidone] Shortness Of Breath     Rapid heart beat, SOB    Zyprexa [Olanzapine] Shortness Of Breath     Rapid heartbeat           Objective     Blood pressure 135/90, pulse 81, height 5' 2\" (1.575 m), weight 82.1 kg (181 lb), not currently breastfeeding. Body mass index is 33.11 kg/m².      PHYSICAL EXAM:      General Appearance:   Alert, cooperative, no distress   HEENT:   Normocephalic, atraumatic, anicteric.     Neck:  Supple, symmetrical, trachea midline   Lungs:   Clear to auscultation bilaterally; no rales, rhonchi or wheezing; respirations unlabored    Heart::   Regular rate and rhythm; no murmur, rub, or gallop.   Abdomen:   Soft, epigastric area tender to palpation, non-distended; normal bowel sounds; no masses, no organomegaly    Genitalia:   Deferred    Rectal:   Deferred    Extremities:  No cyanosis, clubbing or edema    Pulses:  2+ and symmetric    Skin:  No jaundice, rashes, or lesions    Lymph nodes:  No palpable cervical lymphadenopathy        Lab Results:   No visits with results within 1 Day(s) from this visit.   Latest known visit with results is:   Admission on 05/21/2024, Discharged on 05/21/2024   Component Date Value    Ventricular Rate 05/21/2024 88     Atrial Rate 05/21/2024 88     WY Interval 05/21/2024 136     QRSD " Interval 05/21/2024 84     QT Interval 05/21/2024 364     QTC Interval 05/21/2024 440     P Axis 05/21/2024 68     QRS Axis 05/21/2024 6     T Wave [place] 05/21/2024 75     WBC 05/21/2024 5.36     RBC 05/21/2024 4.98     Hemoglobin 05/21/2024 13.5     Hematocrit 05/21/2024 40.5     MCV 05/21/2024 81 (L)     MCH 05/21/2024 27.1     MCHC 05/21/2024 33.3     RDW 05/21/2024 13.7     MPV 05/21/2024 7.9 (L)     Platelets 05/21/2024 314     nRBC 05/21/2024 0     Segmented % 05/21/2024 75     Immature Grans % 05/21/2024 1     Lymphocytes % 05/21/2024 10 (L)     Monocytes % 05/21/2024 9     Eosinophils Relative 05/21/2024 4     Basophils Relative 05/21/2024 1     Absolute Neutrophils 05/21/2024 4.12     Absolute Immature Grans 05/21/2024 0.03     Absolute Lymphocytes 05/21/2024 0.53 (L)     Absolute Monocytes 05/21/2024 0.46     Eosinophils Absolute 05/21/2024 0.19     Basophils Absolute 05/21/2024 0.03     Sodium 05/21/2024 131 (L)     Potassium 05/21/2024 2.9 (L)     Chloride 05/21/2024 91 (L)     CO2 05/21/2024 30     ANION GAP 05/21/2024 10     BUN 05/21/2024 6     Creatinine 05/21/2024 0.55 (L)     Glucose 05/21/2024 92     Calcium 05/21/2024 10.2     AST 05/21/2024 13     ALT 05/21/2024 14     Alkaline Phosphatase 05/21/2024 112 (H)     Total Protein 05/21/2024 7.1     Albumin 05/21/2024 4.3     Total Bilirubin 05/21/2024 0.30     eGFR 05/21/2024 100     Lipase 05/21/2024 24     Color, UA 05/21/2024 Colorless     Clarity, UA 05/21/2024 Clear     Specific Gravity, UA 05/21/2024 1.015     pH, UA 05/21/2024 6.5     Leukocytes, UA 05/21/2024 Small (A)     Nitrite, UA 05/21/2024 Negative     Protein, UA 05/21/2024 Negative     Glucose, UA 05/21/2024 Negative     Ketones, UA 05/21/2024 Negative     Urobilinogen, UA 05/21/2024 <2.0     Bilirubin, UA 05/21/2024 Negative     Occult Blood, UA 05/21/2024 Negative     hs TnI 0hr 05/21/2024 4     hs TnI 2hr 05/21/2024 4     Delta 2hr hsTnI 05/21/2024 0     RBC, UA 05/21/2024 None  Seen     WBC, UA 05/21/2024 1-2     Epithelial Cells 05/21/2024 Occasional     Bacteria, UA 05/21/2024 None Seen     Renal Epithelial Cells 05/21/2024 Present     Transitional Epithelial * 05/21/2024 Present          Radiology Results:   CT abdomen pelvis w contrast    Result Date: 5/21/2024  Narrative: CT ABDOMEN AND PELVIS WITH IV CONTRAST INDICATION: abd pain. COMPARISON: CT abdomen and pelvis 2/6/2024 and 12/17/2022 TECHNIQUE: CT examination of the abdomen and pelvis was performed. Multiplanar 2D reformatted images were created from the source data. This examination, like all CT scans performed in the [facility], was performed utilizing techniques to minimize radiation dose exposure, including the use of iterative reconstruction and automated exposure control. Radiation dose length product (DLP) for this visit: 564 mGy-cm IV Contrast: 85 mL of iohexol (OMNIPAQUE) 350 Enteric Contrast: Not administered. FINDINGS: ABDOMEN LOWER CHEST: Small hiatal hernia. Circumferential thickening of the distal esophagus attributed to chronic esophagitis. 4 mm juxtapleural nodule in the right middle lobe unchanged as far back as December 2022 compatible with a benign nodule. Partially visualized bilateral breast implants. LIVER/BILIARY TREE: Unremarkable. GALLBLADDER: Cholelithiasis without findings of acute cholecystitis. SPLEEN: Unremarkable. PANCREAS: Unremarkable. ADRENAL GLANDS: Unremarkable. KIDNEYS/URETERS: Unremarkable. No hydronephrosis. STOMACH AND BOWEL: Fluid is present in the rectum. Semisolid stool in the sigmoid colon. Tortuous redundant colon. Sigmoid colon extends into the right upper abdomen and cecum and ileocecal junction in the right upper/mid abdomen, similar to prior study.  No bowel obstruction. APPENDIX: Normal. ABDOMINOPELVIC CAVITY: No ascites. No pneumoperitoneum. No lymphadenopathy. VESSELS: Mild atherosclerotic disease. No abdominal aortic aneurysm. PELVIS REPRODUCTIVE ORGANS:  Unremarkable for patient's age. URINARY BLADDER: Slightly limited evaluation due to beam and streak artifact emanating from right hip prosthesis. Visualized portions are unremarkable. ABDOMINAL WALL/INGUINAL REGIONS: Tiny fat-containing umbilical hernia. BONES: No acute fracture or osseous destructive lesion identified. Mild compression fracture of the superior endplate of L3 with minimal underlying sclerosis and without retropulsion presumably subacute fracture albeit new since 2/6/2024 partially visualized right hip prosthesis and proximal femoral orthopedic hardware. Minimal heterotopic ossification adjacent to the proximal right femur. Degenerative changes of the spine, pubic symphysis, and multiple joints. Stable mild levoconvex lumbar scoliosis. Stable grade 1 degenerative anterolisthesis of L4 on L5.     Impression: Nonobstructive bowel suggestive of diarrheal illness. No other evidence of acute abdominopelvic process. Stable small hiatal hernia and thickening of the distal esophagus attributed to chronic esophagitis. Mild compression fracture of L3, presumably subacute new since 2/6/2024. No retropulsion. Correlate with clinical findings. Additional chronic findings and negatives as above. The study was marked in EPIC for immediate notification. Workstation performed: [id number]     XR chest 2 views    Result Date: 5/9/2024  Narrative: XR CHEST PA & LATERAL INDICATION: chest pain, sob. COMPARISON: None FINDINGS: Heart size is indeterminate due to hypoinflation Clear lungs. No pneumothorax or pleural effusion. Hypoinflated lungs Bones are unremarkable for age. Normal upper abdomen.     Impression: No acute consolidation or congestion Workstation performed: [id number]

## 2024-06-05 NOTE — PATIENT INSTRUCTIONS
Follow antireflux diet and measures  Avoid caffeine, chocolate, carbonated beverages, and tomato based products.  When you eat stay sitting upright for 1 hour afterwards do not eat 2 hours before you go to bed and sleep with your head of bed elevated on multiple pillows.    follow gastroparesis diet eat less than 6 g of fiber daily 6 small meals  Continue pantoprazole 40 mg twice a day   Famotidine increased to 40 mg twice a day  Continue Carafate 3 times a day 30 minutes before meals  May take dicyclomine as needed for abdominal pain    Gastroparesis   WHAT YOU NEED TO KNOW:   What is gastroparesis?  Gastroparesis is a condition that causes food to move more slowly than normal from the stomach to the intestines. Gastroparesis is not caused by blockage. Often, the cause may not be known. It may be caused by damage to a nerve that controls muscles used to move food to your small intestines.   What puts me at risk for gastroparesis?   Diabetes for 10 years or more    Hypothyroidism     Gastric or antireflux surgery    GERD    Past viral infection     Medicines such as narcotics and some medicines used to treat diabetes    Conditions that affect your nervous system, such as Parkinson disease    Conditions that affect your connective tissue, such as scleroderma    What are the signs and symptoms of gastroparesis?   Nausea and vomiting    Feeling full sooner than normal or after eating less than usual    Abdominal bloating and pain    Weight loss or poor nutrition     Frequent changes in blood sugar    How is gastroparesis diagnosed?  You may need any of the following tests:  Blood tests  will show your blood counts and electrolyte (mineral) levels.     Gastric emptying scintigraphy (GES)  measures how quickly food moves through your stomach and into your intestine. A material is put into scrambled eggs and the eggs are eaten. Pictures of the material are taken as it travels through the stomach and into the small  intestine.     A SmartPill test  may be done to measure changes in pH and pressure in the gastrointestinal (GI) tract. You will need to swallow a capsule that transmits radio waves and records the measurements.     A breath test  is done by eating eggs with carbon in them. Breath samples are taken over a period of hours. The amount of carbon is measured in exhaled air. This shows how fast the stomach is emptying.     An endoscopy  may be done to find problems in the esophagus or stomach. A small, thin tube with a camera and a light will be inserted into your esophagus and stomach.     An x-ray or CT scan  may show problems in your stomach. You may be given contrast liquid to help your stomach show up better in the pictures. Tell the healthcare provider if you have ever had an allergic reaction to contrast liquid.    How is gastroparesis treated?   Medicines  may be given to control your nausea and vomiting. You may receive medicines that help food move through your stomach at a more normal rate.     Nutrition support  may be given as liquid supplements. You may need to see a dietitian for help with a nutrition plan.     Gastric electrical stimulation (GES)  sends electrical pulses to the nerves in your stomach to help food move through to your intestines. It may also help control nausea and vomiting. GES is done when symptoms cannot be controlled with other treatments.     Surgery  may be needed if other treatments do not work. You may need surgery to place a feeding tube through your skin and into your small intestine.    What else can I do to manage gastroparesis?  Your healthcare provider may suggest any of the following:  Change your eating habits.  Take small bites of food to make it easier for your body to digest. You may need to eat several small meals low in fiber and fat throughout the day. Ask your dietitian for help with planning meals.     Do not eat raw fruits, vegetables, or whole grains.  These can  cause you to have undigested food in your stomach. The undigested food can form a blockage that can become life-threatening.     Drink liquids as directed.  Liquids will prevent dehydration caused by vomiting. Slowly drink small amounts of liquids at a time. Ask your healthcare provider how much liquid to drink each day, and which liquids are best for you. You may also need to drink an oral rehydration solution (ORS). An ORS has the right amounts of sugar, salt, and minerals in water to replace body fluids.     Do not lie down for 2 hours after your meals.  Walking and sitting after meals help with digestion.     Control your blood sugar levels if you have diabetes.  High blood sugar levels may make your symptoms worse. Ask your healthcare provider how to control your blood sugar levels.    You or someone else should call 911 for any of the following:   Your heart is beating faster and you are breathing faster than usual.    You cannot be woken up.    When should I seek immediate care?   You are confused or have trouble thinking clearly.    You are dizzy or very drowsy.    When should I contact my healthcare provider?   You are urinating less than usual.     Your symptoms return or become worse.    The color of your urine is dark yellow.     You have questions or concerns about your condition or care.    CARE AGREEMENT:   You have the right to help plan your care. Learn about your health condition and how it may be treated. Discuss treatment options with your healthcare providers to decide what care you want to receive. You always have the right to refuse treatment. The above information is an  only. It is not intended as medical advice for individual conditions or treatments. Talk to your doctor, nurse or pharmacist before following any medical regimen to see if it is safe and effective for you.  © Copyright [employer] 2023 Information is for End User's use only and may not be sold, redistributed or  otherwise used for commercial purposes.    Low Fat Diet   WHAT YOU NEED TO KNOW:   What is a low-fat diet?  A low-fat diet is an eating plan that is low in total fat, unhealthy fat, and cholesterol. You may need to follow a low-fat diet if you have trouble digesting or absorbing fat. You may also need to follow this diet if you have high cholesterol. You can also lower your cholesterol by increasing the amount of fiber in your diet. Soluble fiber is a type of fiber that helps to decrease cholesterol levels.  What do I need to know about the different types of fat in food?   Limit unhealthy fats.  A diet that is high in cholesterol, saturated fat, and trans fat may cause unhealthy cholesterol levels. Unhealthy cholesterol levels increase your risk of heart disease.    Cholesterol:  Limit intake of cholesterol to less than 200 mg per day. Cholesterol is found in meat, eggs, and dairy.    Saturated fat:  Limit saturated fat to less than 7% of your total daily calories. Ask your dietitian how many calories you need each day. Saturated fat is found in butter, cheese, ice cream, whole milk, and palm oil. Saturated fat is also found in meat, such as beef, pork, chicken skin, and processed meats. Processed meats include sausage, hot dogs, and bologna.     Trans fat:  Avoid trans fat as much as possible. Trans fat is used in fried and baked foods. Foods that say trans fat free on the label may still have up to 0.5 grams of trans fat per serving.    Include healthy fats.  Replace foods that are high in saturated and trans fat with foods high in healthy fats. This may help to decrease high cholesterol levels.     Monounsaturated fats:  These are found in avocados, nuts, and vegetable oils, such as olive, canola, and sunflower oil.    Polyunsaturated fats:  These can be found in vegetable oils, such as soybean or corn oil. Omega-3 fats can help to decrease the risk of heart disease. Omega-3 fats are found in fish, such as  salmon, herring, trout, and tuna. Omega-3 fats can also be found in plant foods, such as walnuts, flaxseed, soybeans, and canola oil.       What foods should I limit or avoid?   Grains:      Snacks that are made with partially hydrogenated oils, such as chips, regular crackers, and butter-flavored popcorn    High-fat baked goods, such as biscuits, croissants, doughnuts, pies, cookies, and pastries    Dairy:      Whole milk, 2% milk, and yogurt and ice cream made with whole milk    Half and half creamer, heavy cream, and whipping cream    Cheese, cream cheese, and sour cream    Meats and proteins:      High-fat cuts of meat (T-bone steak, regular hamburger, and ribs)    Fried meat, poultry (turkey and chicken), and fish    Poultry (chicken and turkey) with skin    Cold cuts (salami or bologna), hot dogs, barron, and sausage    Whole eggs and egg yolks    Vegetables and fruits with added fat:      Fried vegetables or vegetables in butter or high-fat sauces, such as cream or cheese sauces    Fried fruit or fruit served with butter or cream    Fats:      Butter, stick margarine, and shortening    Coconut, palm oil, and palm kernel oil    What foods should I include?       Grains:      Whole-grain breads, cereals, pasta, and brown rice    Low-fat crackers and pretzels    Vegetables and fruits:      Fresh, frozen, or canned vegetables (no salt or low-sodium)    Fresh, frozen, dried, or canned fruit (canned in light syrup or fruit juice)    Avocado    Low-fat dairy products:      Nonfat (skim) or 1% milk    Nonfat or low-fat cheese, yogurt, and cottage cheese    Meats and proteins:      Chicken or turkey with no skin    Baked or broiled fish    Lean beef and pork (loin, round, extra lean hamburger)    Beans and peas, unsalted nuts, soy products    Egg whites and substitutes    Seeds and nuts    Fats:      Unsaturated oil, such as canola, olive, peanut, soybean, or sunflower oil    Soft or liquid margarine and vegetable oil  spread    Low-fat salad dressing  What are some other ways I can decrease fat?   Read food labels before you buy foods.  Choose foods that have less than 30% of calories from fat. Choose low-fat or fat-free dairy products. Remember that fat free does not mean calorie free. These foods still contain calories, and too many calories can lead to weight gain.     Trim fat from meat and avoid fried food.  Trim all visible fat from meat before you cook it. Remove the skin from poultry. Do not miller meat, fish, or poultry. Bake, roast, boil, or broil these foods instead. Avoid fried foods. Eat a baked potato instead of French fries. Steam vegetables instead of sautéing them in butter.     Add less fat to foods.  Use imitation barron bits on salads and baked potatoes instead of regular barron bits. Use fat-free or low-fat salad dressings instead of regular dressings. Use low-fat or nonfat butter-flavored topping instead of regular butter or margarine on popcorn and other foods.    How can I decrease fat in recipes?  Replace high-fat ingredients with low-fat or nonfat ones. This may cause baked goods to be drier than usual. You may need to use nonfat cooking spray on pans to prevent food from sticking. You also may need to change the amount of other ingredients, such as water, in the recipe. Try the following:  Use low-fat or light margarine instead of regular margarine or shortening.     Use lean ground turkey breast or chicken, or lean ground beef (less than 5% fat) instead of hamburger.     Add 1 teaspoon of canola oil to 8 ounces of skim milk instead of using cream or half and half.     Use grated zucchini, carrots, or apples in breads instead of coconut.    Use blenderized, low-fat cottage cheese, plain tofu, or low-fat ricotta cheese instead of cream cheese.     Use 1 egg white and 1 teaspoon of canola oil, or use ¼ cup (2 ounces) of fat-free egg substitute instead of a whole egg.     Replace half of the oil that is called  for in a recipe with applesauce when you bake. Use 3 tablespoons of cocoa powder and 1 tablespoon of canola oil instead of a square of baking chocolate.    How can I increase fiber?  Eat enough high-fiber foods to get 20 to 30 grams of fiber every day. Slowly increase your fiber intake to avoid stomach cramps, gas, and other problems.  Eat 3 ounces of whole-grain foods each day.  An ounce is about 1 slice of bread. Eat whole-grain breads, such as whole-wheat bread. Whole wheat, whole-wheat flour, or other whole grains should be listed as the first ingredient on the food label. Replace white flour with whole-grain flour or use half of each in recipes. Whole-grain flour is heavier than white flour, so you may have to add more yeast or baking powder.     Eat a high-fiber cereal for breakfast.  Oatmeal is a good source of soluble fiber. Look for cereals that have bran or fiber in the name. Choose whole-grain products, such as brown rice, barley, and whole-wheat pasta.     Eat more beans, peas, and lentils.  For example, add beans to soups or salads. Eat at least 5 cups of fruits and vegetables each day. Eat fruits and vegetables with the peel because the peel is high in fiber.       CARE AGREEMENT:   You have the right to help plan your care. Discuss treatment options with your healthcare provider to decide what care you want to receive. You always have the right to refuse treatment. The above information is an  only. It is not intended as medical advice for individual conditions or treatments. Talk to your doctor, nurse or pharmacist before following any medical regimen to see if it is safe and effective for you.  © Copyright [employer] 2023 Information is for End User's use only and may not be sold, redistributed or otherwise used for commercial purposes.

## 2024-06-05 NOTE — TELEPHONE ENCOUNTER
Scheduled date of EGD(as of today): 6/28/24  Physician performing EGD: Dr. Hart  Location of EGD:   Instructions reviewed with patient by: tl given at [facility]  Clearances: n/a

## 2024-06-08 PROBLEM — R05.9 COUGH: Status: RESOLVED | Noted: 2024-05-09 | Resolved: 2024-06-08

## 2024-06-11 ENCOUNTER — TELEPHONE (OUTPATIENT)
Age: 64
End: 2024-06-11

## 2024-06-11 DIAGNOSIS — R05.9 COUGH, UNSPECIFIED TYPE: Primary | ICD-10-CM

## 2024-06-11 RX ORDER — BENZONATATE 100 MG/1
100 CAPSULE ORAL 3 TIMES DAILY PRN
Qty: 20 CAPSULE | Refills: 0 | Status: SHIPPED | OUTPATIENT
Start: 2024-06-11

## 2024-06-11 NOTE — TELEPHONE ENCOUNTER
Pt requested a refill for med   benzonatate (TESSALON PERLES) 100 mg capsule. However, pharmacy won't release med to her.         Office staff was not available at this time.       Please contact pt and advise. Thank you for your kind assistance.

## 2024-06-19 ENCOUNTER — TELEPHONE (OUTPATIENT)
Age: 64
End: 2024-06-19

## 2024-06-22 LAB
BASOPHILS # BLD AUTO: 29 CELLS/UL (ref 0–200)
BASOPHILS NFR BLD AUTO: 0.6 %
EOSINOPHIL # BLD AUTO: 221 CELLS/UL (ref 15–500)
EOSINOPHIL NFR BLD AUTO: 4.5 %
ERYTHROCYTE [DISTWIDTH] IN BLOOD BY AUTOMATED COUNT: 13.8 % (ref 11–15)
FERRITIN SERPL-MCNC: 14 NG/ML (ref 16–288)
FOLATE SERPL-MCNC: 9.5 NG/ML
HCT VFR BLD AUTO: 35.7 % (ref 35–45)
HGB BLD-MCNC: 11.6 G/DL (ref 11.7–15.5)
IRON SATN MFR SERPL: 7 % (CALC) (ref 16–45)
IRON SERPL-MCNC: 30 MCG/DL (ref 45–160)
LYMPHOCYTES # BLD AUTO: 549 CELLS/UL (ref 850–3900)
LYMPHOCYTES NFR BLD AUTO: 11.2 %
MCH RBC QN AUTO: 26.5 PG (ref 27–33)
MCHC RBC AUTO-ENTMCNC: 32.5 G/DL (ref 32–36)
MCV RBC AUTO: 81.7 FL (ref 80–100)
MONOCYTES # BLD AUTO: 338 CELLS/UL (ref 200–950)
MONOCYTES NFR BLD AUTO: 6.9 %
NEUTROPHILS # BLD AUTO: 3763 CELLS/UL (ref 1500–7800)
NEUTROPHILS NFR BLD AUTO: 76.8 %
PLATELET # BLD AUTO: 299 THOUSAND/UL (ref 140–400)
PMV BLD REES-ECKER: 8.6 FL (ref 7.5–12.5)
RBC # BLD AUTO: 4.37 MILLION/UL (ref 3.8–5.1)
TIBC SERPL-MCNC: 436 MCG/DL (CALC) (ref 250–450)
VIT B12 SERPL-MCNC: 1008 PG/ML (ref 200–1100)
WBC # BLD AUTO: 4.9 THOUSAND/UL (ref 3.8–10.8)

## 2024-06-30 PROBLEM — J01.00 ACUTE NON-RECURRENT MAXILLARY SINUSITIS: Status: RESOLVED | Noted: 2020-06-01 | Resolved: 2024-06-30

## 2024-07-03 ENCOUNTER — TELEPHONE (OUTPATIENT)
Dept: HEMATOLOGY ONCOLOGY | Facility: CLINIC | Age: 64
End: 2024-07-03

## 2024-07-03 ENCOUNTER — OFFICE VISIT (OUTPATIENT)
Dept: HEMATOLOGY ONCOLOGY | Facility: CLINIC | Age: 64
End: 2024-07-03
Payer: COMMERCIAL

## 2024-07-03 VITALS
HEART RATE: 100 BPM | HEIGHT: 62 IN | RESPIRATION RATE: 18 BRPM | SYSTOLIC BLOOD PRESSURE: 128 MMHG | OXYGEN SATURATION: 97 % | TEMPERATURE: 97.4 F | WEIGHT: 178.8 LBS | BODY MASS INDEX: 32.9 KG/M2 | DIASTOLIC BLOOD PRESSURE: 72 MMHG

## 2024-07-03 DIAGNOSIS — E53.8 FOLATE DEFICIENCY: ICD-10-CM

## 2024-07-03 DIAGNOSIS — D51.8 VITAMIN B12 DEFICIENCY (DIETARY) ANEMIA: ICD-10-CM

## 2024-07-03 DIAGNOSIS — D50.9 IRON DEFICIENCY ANEMIA, UNSPECIFIED IRON DEFICIENCY ANEMIA TYPE: Primary | ICD-10-CM

## 2024-07-03 DIAGNOSIS — D50.9 MICROCYTIC ANEMIA: ICD-10-CM

## 2024-07-03 PROCEDURE — 99214 OFFICE O/P EST MOD 30 MIN: CPT

## 2024-07-03 RX ORDER — FOLIC ACID 1 MG/1
1 TABLET ORAL DAILY
Qty: 30 TABLET | Refills: 2 | Status: SHIPPED | OUTPATIENT
Start: 2024-07-03

## 2024-07-03 RX ORDER — SODIUM CHLORIDE 9 MG/ML
20 INJECTION, SOLUTION INTRAVENOUS ONCE
OUTPATIENT
Start: 2024-07-17

## 2024-07-03 NOTE — PROGRESS NOTES
"HEMATOLOGY / ONCOLOGY CLINIC FOLLOW UP NOTE    Primary Care Provider: Raheem Cain MD  Referring Provider:    MRN: [id number]  : 1960    Reason for Encounter: Follow-up microcytic anemia         Interval History: Patient presents for follow-up of her microcytic anemia.  Patient has a PMH of Schreiber's esophagus, gastroparesis, hiatal hernia, esophagitis, chronic anemia.  Patient recently saw gastroenterology for epigastric pain, hiatal hernia and gastroparesis.  She is scheduled for an EGD next week.    Patient continues to be intolerant to minutes amount of stress.  Her mother is on comfort care.  She reports she is in therapy, which is helping.  Patient continues to endorse fatigue, dizziness, lightheadedness and reports sleeping \"all the time.\"  She reports she is only getting 3 to 4 hours of sleep.  Patient denies any fevers, frequent infections, drenching night sweats, decreased appetite or unintentional weight loss.  No abdominal pain, constipation or diarrhea.  Patient denies abnormal bleeding: epistaxis, gingival bleeding, hematuria, dark tarry stools.      Labs:  2024: Hgb 11.6, MCV 81.7, WBC 4.9, ANC 3.7, lymphocytes 549, serum iron 30, iron saturation 7%, ferritin 14, folate 9.5, vitamin B12 1008    3/25/2024: Hgb 12.6, MCV 82.9, WBC 3.3, ANC 1.97, absolute lymphocytes 647, vitamin B12 1882, folate 14.2     9/15/2023: Hgb 12.8, MCV 84.8, platelets 235,000, WBC 2.0, ANC 1.01, serum iron 83, iron saturation 22%, ferritin 101.      Treatment:  2023 to 2023 - Venofer 300 mg x 4 doses     2023: Started on oral iron supplements and folic acid 400 mcg daily    REVIEW OF SYSTEMS:  Please note that a 14-point review of systems was performed to include Constitutional, HEENT, Respiratory, CVS, GI, , Musculoskeletal, Integumentary, Neurologic, Rheumatologic, Endocrinologic, Psychiatric, Lymphatic, and Hematologic/Oncologic systems were reviewed and are negative unless otherwise stated in " HPI. Positive and negative findings pertinent to this evaluation are incorporated into the history of present illness.      ECOG PS: 1    HPI:  Irene Plascencia was seen for initial consultation 6/2/2023 regarding microcytic anemia.  Patient has a PMH of Schreiber's esophagus, gastroparesis, hiatal hernia, esophagitis, chronic anemia. She was recently hospitalized on 4/28 for dizziness, cough and vomiting.  She was found to have a Hgb of 6.6, ferritin 3, serum iron 13, % saturation 2, Vitamin B12 562, Folate 13.5.  She was transfused 1U PRBCs and IV iron.  Patient declined further evaluation from GI inpatient and was to follow up with her Gastroenterologist upon discharge.       Irene is c/o fatigue, dizziness, headaches.  Denies PICA, RLS, blood in urine or stool.  She is taking oral iron supplements daily.      Recent labs: Hgb 11.4, MCV 69, WBC 5.7, Platelets 368.     Patient had an EGD completed 11/2022 for her Schreiber's esophagus, no abnormal findings.  Her last colonoscopy was 2020, found to have a polyp, otherwise, unremarkable.       Treatment:  6/21/2023 to 7/12/2023 - Venofer 300 mg x 4 doses     9/25/2023: Started on oral iron supplements and folic acid 400 mcg daily    PROBLEM LIST:  Patient Active Problem List   Diagnosis    Lumbar radiculopathy    DDD (degenerative disc disease), lumbar    Spondylolisthesis at L4-L5 level    Localized osteoporosis with current pathological fracture with routine healing    Spinal stenosis of lumbar region with neurogenic claudication    Lumbar spondylosis    T12 compression fracture (HCC)    Synovial cyst of lumbar facet joint    Anxiety    Bulimia nervosa in remission    Constipation    Acquired hypothyroidism    Other fatigue    Serotonin syndrome    Schizoaffective disorder (HCC)    Psychiatric disorder    Dermal hypersensitivity reaction    Primary hypertension    Right hip pain    Chronic bilateral low back pain without sciatica    Severe iron deficiency  anemia     Preop exam for internal medicine    NMS (neuroleptic malignant syndrome)    Hypokalemia    Fall    Esophagitis    Hyponatremia    Problem related to living arrangement    Iron deficiency anemia    Multiple excoriations    Rash    Cellulitis of left upper extremity    Erosive esophagitis    Melena    Hiatal hernia with GERD without esophagitis    Polyp of colon    Word finding difficulty    SIRS (systemic inflammatory response syndrome) (HCC)    Hyperlipidemia    Nausea and vomiting    S/P tooth extraction    Upper GI bleed    Pruritus    Self neglect    Anemia    Schreiber's esophagus    Acute encephalopathy    Thrombocytosis    Abdominal pain    Stress incontinence    Ulcer of toe, chronic, left, with unspecified severity (HCC)    Nutritional anemia    Headache    Acute respiratory failure with hypoxia (HCC)    Sepsis without acute organ dysfunction (HCC)    Post concussion syndrome    Neutropenia (HCC)    Vitamin B12 deficiency (dietary) anemia    Folate deficiency    Acquired absence of other toe(s), unspecified side (HCC)    Osteomyelitis, unspecified site, unspecified type (HCC)    Elevated vitamin B12 level    Iron deficiency    Heartburn    Gastroparesis       Assessment / Plan: 63-year-old female with microcytic anemia.  We discussed despite taking oral iron supplements daily, her ferritin has dropped.  We reviewed this is likely secondary to malabsorption due to gastroparesis.  She will likely benefit from another round of IV iron treatment, Venofer 300 mg weekly x 4 doses.  She tolerated these infusions previously without incident.  Recommend she stop oral iron supplements when she starts her IV iron treatment and she can resume 2 weeks after her last infusion.    In addition, patient's folic acid is on the low end of normal therefore, we will increase her dose to folic acid 1 mg daily instead of 400 mcg daily.  She is aware to stop the full dose and restart the new dose.  Patient is not taking  any oral vitamin B12 supplements, we were keeping an eye on the level as it was elevated previously.  Informed patient that it is coming down and we will continue to keep an eye on it.    We will see her back in the office in 4 months with labs prior (CBCD, iron panel, vitamin B12, folate).  She is agreeable with the plan above.  Patient aware to contact us for any additional questions/concerns or worsening symptoms.        I spent 30 minutes on chart review, face to face counseling time, coordination of care and documentation.    Past Medical History:   has a past medical history of Anemia, Anxiety, Arthritis, Back pain at L4-L5 level, Schreiber esophagus, Bulimia, Colon polyp, Disease of thyroid gland, GERD (gastroesophageal reflux disease), Hearing loss in left ear, History of transfusion, Hyperlipidemia, Hypertension, Hypothyroidism, Leukopenia, NMS (neuroleptic malignant syndrome) (01/03/2020), Pneumonia, RA (rheumatoid arthritis) (HCC), Sciatica, Seizure (HCC), Skin spots, red, Synovial cyst of lumbar spine, Vertigo, Wears dentures, and Weight gain.    PAST SURGICAL HISTORY:   has a past surgical history that includes Rhinoplasty; Bone marrow biopsy; Colonoscopy; pr arthrp acetblr/prox fem prostc agrft/algrft (Right, 12/11/2019); Hip Arthroplasty (Right, 01/02/2020); Sinus surgery; Cosmetic surgery; Closed reduction distal femur fracture; pr laps rpr paraesphgl hrna incl fundplsty w/mesh (N/A, 05/11/2021); pr esophagogastroduodenoscopy transoral diagnostic (N/A, 05/11/2021); pr unlisted procedure esophagus (N/A, 05/11/2021); Transoral incisionless fundoplication (TIF) (05/11/2021); Breast surgery; Dental surgery; pr amputation metatarsal w/toe single (Left, 04/07/2023); and Toe amputation (Left).    CURRENT MEDICATIONS  Current Outpatient Medications   Medication Sig Dispense Refill    folic acid (FOLVITE) 1 mg tablet Take 1 tablet (1 mg total) by mouth daily 30 tablet 2    amLODIPine (NORVASC) 5 mg tablet TAKE  1 TABLET (5 MG TOTAL) BY MOUTH DAILY. 90 tablet 3    benzonatate (TESSALON PERLES) 100 mg capsule Take 1 capsule (100 mg total) by mouth 3 (three) times a day as needed for cough 20 capsule 0    celecoxib (CeleBREX) 200 mg capsule Take 1 capsule (200 mg total) by mouth daily 30 capsule 5    [facility] Vitamin B-12 1000 MCG tablet TAKE 1 TABLET BY MOUTH EVERY DAY (Patient not taking: Reported on 5/16/2024) 90 tablet 1    dicyclomine (BENTYL) 20 mg tablet Take 1 tablet (20 mg total) by mouth 2 (two) times a day 20 tablet 0    EPINEPHrine (EPIPEN) 0.3 mg/0.3 mL SOAJ Inject 0.3 mL (0.3 mg total) into a muscle once for 1 dose 0.6 mL 0    famotidine (PEPCID) 40 MG tablet Take 1 tablet (40 mg total) by mouth 2 (two) times a day 60 tablet 5    ferrous sulfate 325 (65 Fe) mg tablet Take 1 tablet (325 mg total) by mouth daily with breakfast (Patient not taking: Reported on 5/16/2024) 60 tablet 3    levothyroxine 25 mcg tablet TAKE 1 TABLET BY MOUTH EVERY DAY 90 tablet 3    LORazepam (ATIVAN) 2 mg tablet Take 1 tablet (2 mg total) by mouth every 4 (four) hours as needed for anxiety 180 tablet 1    ondansetron (ZOFRAN) 4 mg tablet Take 1 tablet (4 mg total) by mouth every 6 (six) hours 12 tablet 5    pantoprazole (PROTONIX) 40 mg tablet TAKE 1 TABLET BY MOUTH TWICE A  tablet 1    PARoxetine (PAXIL) 30 mg tablet TAKE 2 TABLETS (60 MG TOTAL) BY MOUTH IN THE MORNING 180 tablet 1    QUEtiapine (SEROquel XR) 400 mg 24 hr tablet TAKE 1 TABLET (400 MG TOTAL) BY MOUTH DAILY AT BEDTIME 90 tablet 3    sodium chloride 1 g tablet Take 2 tablets (2 g total) by mouth 2 (two) times a day 120 tablet 0    sucralfate (CARAFATE) 1 g tablet TAKE 1 TABLET (1 G TOTAL) BY MOUTH 3 (THREE) TIMES A DAY WITH MEALS 270 tablet 1    triamcinolone (KENALOG) 0.1 % cream APPLY TOPICALLY 2 TIMES A DAY AS NEEDED FOR RASH. 160 g 5    ziprasidone (GEODON) 80 mg capsule TAKE 1 CAPSULE BY MOUTH EVERY DAY 90 capsule 3     No current facility-administered medications  "for this visit.     [unfilled]    SOCIAL HISTORY:   reports that she has never smoked. She has been exposed to tobacco smoke. She has never used smokeless tobacco. She reports that she does not currently use alcohol. She reports that she does not currently use drugs.     FAMILY HISTORY:  family history includes Colon cancer in her maternal grandmother; Esophageal cancer in her father; Uterine cancer in her mother.     ALLERGIES:  is allergic to latex, risperdal [risperidone], and zyprexa [olanzapine].      Physical Exam:  Vital Signs:   Visit Vitals  /72 (BP Location: Right arm, Patient Position: Sitting, Cuff Size: Adult)   Pulse 100   Temp (!) 97.4 °F (36.3 °C) (Temporal)   Resp 18   Ht 5' 2\" (1.575 m)   Wt 81.1 kg (178 lb 12.8 oz)   SpO2 97%   BMI 32.70 kg/m²   OB Status Postmenopausal   Smoking Status Never   BSA 1.82 m²     Body mass index is 32.7 kg/m².  Body surface area is 1.82 meters squared.    GEN: Alert, awake oriented x3, in no acute distress  HEENT- No pallor, icterus, cyanosis, no oral mucosal lesions,   LAD - no palpable cervical, clavicle, axillary, inguinal LAD  Heart- normal S1 S2, regular rate and rhythm, No murmur, rubs.   Lungs- clear breathing sound bilateral.   Abdomen- soft, Non tender, bowel sounds present  Extremities- No cyanosis, clubbing, edema  Neuro- No focal neurological deficit    Labs:  Lab Results   Component Value Date    WBC 4.9 06/21/2024    HGB 11.6 (L) 06/21/2024    HCT 35.7 06/21/2024    MCV 81.7 06/21/2024     06/21/2024     Lab Results   Component Value Date     06/01/2017    SODIUM 131 (L) 05/21/2024    K 2.9 (L) 05/21/2024    CL 91 (L) 05/21/2024    CO2 30 05/21/2024    AGAP 10 05/21/2024    BUN 6 05/21/2024    CREATININE 0.55 (L) 05/21/2024    GLUC 92 05/21/2024    GLUF 92 01/12/2024    CALCIUM 10.2 05/21/2024    AST 13 05/21/2024    ALT 14 05/21/2024    ALKPHOS 112 (H) 05/21/2024    PROT 7.0 06/01/2017    TP 7.1 05/21/2024    BILITOT 0.2 06/01/2017    " TBILI 0.30 05/21/2024    EGFR 100 05/21/2024

## 2024-07-07 DIAGNOSIS — K22.10 EROSIVE ESOPHAGITIS: ICD-10-CM

## 2024-07-07 RX ORDER — PANTOPRAZOLE SODIUM 40 MG/1
40 TABLET, DELAYED RELEASE ORAL 2 TIMES DAILY
Qty: 180 TABLET | Refills: 1 | Status: SHIPPED | OUTPATIENT
Start: 2024-07-07

## 2024-07-09 ENCOUNTER — PATIENT OUTREACH (OUTPATIENT)
Dept: INTERNAL MEDICINE CLINIC | Facility: CLINIC | Age: 64
End: 2024-07-09
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
## 2024-07-16 ENCOUNTER — TELEPHONE (OUTPATIENT)
Dept: GASTROENTEROLOGY | Facility: CLINIC | Age: 64
End: 2024-07-16

## 2024-07-16 ENCOUNTER — OFFICE VISIT (OUTPATIENT)
Dept: OBGYN CLINIC | Facility: CLINIC | Age: 64
End: 2024-07-16
Payer: COMMERCIAL

## 2024-07-16 VITALS
SYSTOLIC BLOOD PRESSURE: 128 MMHG | WEIGHT: 178 LBS | DIASTOLIC BLOOD PRESSURE: 72 MMHG | HEIGHT: 62 IN | BODY MASS INDEX: 32.76 KG/M2

## 2024-07-16 DIAGNOSIS — R68.82 DECREASED LIBIDO: Primary | ICD-10-CM

## 2024-07-16 DIAGNOSIS — N89.8 VAGINAL DRYNESS: ICD-10-CM

## 2024-07-16 PROCEDURE — 99203 OFFICE O/P NEW LOW 30 MIN: CPT | Performed by: OBSTETRICS & GYNECOLOGY

## 2024-07-16 RX ORDER — BUPROPION HYDROCHLORIDE 75 MG/1
75 TABLET ORAL DAILY
Qty: 30 TABLET | Refills: 3 | Status: SHIPPED | OUTPATIENT
Start: 2024-07-16

## 2024-07-16 NOTE — TELEPHONE ENCOUNTER
Called pt to reschedule her EGD she was on her way out to an appt.  Asked me to call back around 3 pm.  I will try her again at this time.

## 2024-07-16 NOTE — TELEPHONE ENCOUNTER
Called pt to reschedule her EGD, pt was on her way out to a DR. Wilkins and reuested to be called back around 3 pm.  I will reach out to her at this time.

## 2024-07-16 NOTE — TELEPHONE ENCOUNTER
Called pt to reschedule her egd, no answer, left a message on vm that I would call her tomorrow(wed)
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
## 2024-07-16 NOTE — TELEPHONE ENCOUNTER
----- Message from Babita LOVE sent at 7/16/2024 11:11 AM EDT -----  Regarding: Reschedule EGD  Please reach out to patient to reschedule EGD with Dr. Hart. Patient was previously schedule 7/9 but canceled due to passing of her mother. Patient may need some time to reschedule.    Thank you,  Babita

## 2024-07-17 ENCOUNTER — TELEPHONE (OUTPATIENT)
Dept: GASTROENTEROLOGY | Facility: CLINIC | Age: 64
End: 2024-07-17

## 2024-07-18 ENCOUNTER — HOSPITAL ENCOUNTER (OUTPATIENT)
Dept: INFUSION CENTER | Facility: HOSPITAL | Age: 64
Discharge: HOME | End: 2024-07-18
Attending: INTERNAL MEDICINE
Payer: COMMERCIAL

## 2024-07-18 VITALS
SYSTOLIC BLOOD PRESSURE: 156 MMHG | DIASTOLIC BLOOD PRESSURE: 90 MMHG | RESPIRATION RATE: 16 BRPM | HEART RATE: 89 BPM | TEMPERATURE: 97 F

## 2024-07-18 DIAGNOSIS — D50.9 IRON DEFICIENCY ANEMIA, UNSPECIFIED IRON DEFICIENCY ANEMIA TYPE: Primary | ICD-10-CM

## 2024-07-18 PROCEDURE — 96366 THER/PROPH/DIAG IV INF ADDON: CPT

## 2024-07-18 PROCEDURE — 96365 THER/PROPH/DIAG IV INF INIT: CPT

## 2024-07-18 RX ORDER — SODIUM CHLORIDE 9 MG/ML
20 INJECTION, SOLUTION INTRAVENOUS ONCE
Status: COMPLETED | OUTPATIENT
Start: 2024-07-18 | End: 2024-07-18

## 2024-07-18 RX ORDER — SODIUM CHLORIDE 9 MG/ML
20 INJECTION, SOLUTION INTRAVENOUS ONCE
Status: CANCELLED | OUTPATIENT
Start: 2024-07-25

## 2024-07-18 RX ADMIN — SODIUM CHLORIDE 20 ML/HR: 0.9 INJECTION, SOLUTION INTRAVENOUS at 11:34

## 2024-07-18 RX ADMIN — IRON SUCROSE 300 MG: 20 INJECTION, SOLUTION INTRAVENOUS at 11:34

## 2024-07-18 NOTE — PROGRESS NOTES
Irene Plascencia  tolerated treatment well with no complications.      Irene Plascencia is aware of future appt on 7/24/24 at 1430.     AVS Declined by Irene Plascencia

## 2024-07-19 ENCOUNTER — TELEPHONE (OUTPATIENT)
Dept: GASTROENTEROLOGY | Facility: CLINIC | Age: 64
End: 2024-07-19

## 2024-07-19 ENCOUNTER — TELEPHONE (OUTPATIENT)
Age: 64
End: 2024-07-19

## 2024-07-19 DIAGNOSIS — R42 DIZZINESS: Primary | ICD-10-CM

## 2024-07-19 RX ORDER — MECLIZINE HYDROCHLORIDE 25 MG/1
25 TABLET ORAL 3 TIMES DAILY PRN
Qty: 30 TABLET | Refills: 5 | Status: SHIPPED | OUTPATIENT
Start: 2024-07-19

## 2024-07-19 NOTE — TELEPHONE ENCOUNTER
Patient called stating she would like to speak to Dr Cain today over the phone if possible. She stated she's having extreme dizziness since this morning and she isn't sure if its from her iron infusion on Wednesday or if it's because she hasn't picked up her celecoxib (CeleBREX) yet from the pharmacy.

## 2024-07-19 NOTE — TELEPHONE ENCOUNTER
Spoke to pt to reschedule her EGD, appt was made for 9/26/2024.  EGD prep instructions are also being billed to the patient.

## 2024-07-19 NOTE — TELEPHONE ENCOUNTER
Scheduled date of EGD(as of today): Sept. 26, 2024  Physician performing EGD: Dr. Hart  Location of EGD: [facility]  Instructions reviewed with patient by: donald  Clearances: na

## 2024-07-22 ENCOUNTER — TELEPHONE (OUTPATIENT)
Dept: INFUSION CENTER | Facility: HOSPITAL | Age: 64
End: 2024-07-22

## 2024-07-22 NOTE — TELEPHONE ENCOUNTER
Call from patient.  Has conflicting appointment on 7/24/24 with her infusion apt.  Reschedule for 7/23/24 at 1;30PM.  Patient aware of new apt date and time.

## 2024-07-23 ENCOUNTER — HOSPITAL ENCOUNTER (OUTPATIENT)
Dept: INFUSION CENTER | Facility: HOSPITAL | Age: 64
Discharge: HOME | End: 2024-07-23
Attending: INTERNAL MEDICINE
Payer: COMMERCIAL

## 2024-07-23 VITALS
RESPIRATION RATE: 16 BRPM | DIASTOLIC BLOOD PRESSURE: 81 MMHG | SYSTOLIC BLOOD PRESSURE: 142 MMHG | TEMPERATURE: 96.7 F | HEART RATE: 89 BPM

## 2024-07-23 DIAGNOSIS — D50.9 IRON DEFICIENCY ANEMIA, UNSPECIFIED IRON DEFICIENCY ANEMIA TYPE: Primary | ICD-10-CM

## 2024-07-23 PROCEDURE — 96366 THER/PROPH/DIAG IV INF ADDON: CPT

## 2024-07-23 PROCEDURE — 96365 THER/PROPH/DIAG IV INF INIT: CPT

## 2024-07-23 RX ORDER — SODIUM CHLORIDE 9 MG/ML
20 INJECTION, SOLUTION INTRAVENOUS ONCE
Status: COMPLETED | OUTPATIENT
Start: 2024-07-23 | End: 2024-07-23

## 2024-07-23 RX ORDER — SODIUM CHLORIDE 9 MG/ML
20 INJECTION, SOLUTION INTRAVENOUS ONCE
Status: CANCELLED | OUTPATIENT
Start: 2024-07-31

## 2024-07-23 RX ADMIN — SODIUM CHLORIDE 20 ML/HR: 0.9 INJECTION, SOLUTION INTRAVENOUS at 13:21

## 2024-07-23 RX ADMIN — IRON SUCROSE 300 MG: 20 INJECTION, SOLUTION INTRAVENOUS at 13:21

## 2024-07-31 ENCOUNTER — HOSPITAL ENCOUNTER (OUTPATIENT)
Dept: INFUSION CENTER | Facility: HOSPITAL | Age: 64
Discharge: HOME | End: 2024-07-31
Attending: INTERNAL MEDICINE
Payer: COMMERCIAL

## 2024-07-31 VITALS
HEART RATE: 98 BPM | SYSTOLIC BLOOD PRESSURE: 157 MMHG | TEMPERATURE: 97 F | RESPIRATION RATE: 16 BRPM | DIASTOLIC BLOOD PRESSURE: 85 MMHG

## 2024-07-31 DIAGNOSIS — E53.8 FOLATE DEFICIENCY: ICD-10-CM

## 2024-07-31 DIAGNOSIS — D50.9 IRON DEFICIENCY ANEMIA, UNSPECIFIED IRON DEFICIENCY ANEMIA TYPE: Primary | ICD-10-CM

## 2024-07-31 PROCEDURE — 96365 THER/PROPH/DIAG IV INF INIT: CPT

## 2024-07-31 PROCEDURE — 96366 THER/PROPH/DIAG IV INF ADDON: CPT

## 2024-07-31 RX ORDER — FOLIC ACID 1 MG/1
1000 TABLET ORAL DAILY
Qty: 90 TABLET | Refills: 1 | Status: SHIPPED | OUTPATIENT
Start: 2024-07-31

## 2024-07-31 RX ORDER — SODIUM CHLORIDE 9 MG/ML
20 INJECTION, SOLUTION INTRAVENOUS ONCE
Status: COMPLETED | OUTPATIENT
Start: 2024-07-31 | End: 2024-07-31

## 2024-07-31 RX ORDER — SODIUM CHLORIDE 9 MG/ML
20 INJECTION, SOLUTION INTRAVENOUS ONCE
OUTPATIENT
Start: 2024-08-06

## 2024-07-31 RX ADMIN — SODIUM CHLORIDE 20 ML/HR: 0.9 INJECTION, SOLUTION INTRAVENOUS at 13:59

## 2024-07-31 RX ADMIN — IRON SUCROSE 300 MG: 20 INJECTION, SOLUTION INTRAVENOUS at 13:59

## 2024-07-31 NOTE — PROGRESS NOTES
Irene Plascencia  tolerated treatment well with no complications.      Irene Plascencia is aware of future appt on 8/7/24 at 2:00 pm.     AVS printed and given to Irene Plascencia:  No (Declined by Irene Plascencia)

## 2024-08-02 DIAGNOSIS — F41.1 GAD (GENERALIZED ANXIETY DISORDER): Chronic | ICD-10-CM

## 2024-08-02 RX ORDER — ZIPRASIDONE HYDROCHLORIDE 80 MG/1
80 CAPSULE ORAL DAILY
Qty: 90 CAPSULE | Refills: 3 | OUTPATIENT
Start: 2024-08-02

## 2024-08-02 NOTE — TELEPHONE ENCOUNTER
Medication: ziprasidone (GEODON) 80 mg capsule     Dose/Frequency: TAKE 1 CAPSULE BY MOUTH EVERY DAY     Quantity: 90 capsules    Pharmacy: [facility]/pharmacy #[id number] - [place], [place] - [address]     Office:   [x] PCP/Provider -   [] Speciality/Provider -     Does the patient have enough for 3 days?   [] Yes   [x] No - Send as HP to POD

## 2024-08-07 ENCOUNTER — HOSPITAL ENCOUNTER (OUTPATIENT)
Dept: INFUSION CENTER | Facility: HOSPITAL | Age: 64
Discharge: HOME | End: 2024-08-07
Attending: INTERNAL MEDICINE
Payer: COMMERCIAL

## 2024-08-07 VITALS
DIASTOLIC BLOOD PRESSURE: 90 MMHG | TEMPERATURE: 96.9 F | HEART RATE: 106 BPM | RESPIRATION RATE: 15 BRPM | SYSTOLIC BLOOD PRESSURE: 154 MMHG

## 2024-08-07 DIAGNOSIS — D50.9 IRON DEFICIENCY ANEMIA, UNSPECIFIED IRON DEFICIENCY ANEMIA TYPE: Primary | ICD-10-CM

## 2024-08-07 DIAGNOSIS — R91.1 LUNG NODULE: Primary | ICD-10-CM

## 2024-08-07 PROCEDURE — 96365 THER/PROPH/DIAG IV INF INIT: CPT

## 2024-08-07 PROCEDURE — 96366 THER/PROPH/DIAG IV INF ADDON: CPT

## 2024-08-07 RX ORDER — SODIUM CHLORIDE 9 MG/ML
20 INJECTION, SOLUTION INTRAVENOUS ONCE
Status: CANCELLED | OUTPATIENT
Start: 2024-08-07

## 2024-08-07 RX ORDER — SODIUM CHLORIDE 9 MG/ML
20 INJECTION, SOLUTION INTRAVENOUS ONCE
Status: COMPLETED | OUTPATIENT
Start: 2024-08-07 | End: 2024-08-07

## 2024-08-07 RX ADMIN — SODIUM CHLORIDE 20 ML/HR: 0.9 INJECTION, SOLUTION INTRAVENOUS at 13:47

## 2024-08-07 RX ADMIN — IRON SUCROSE 300 MG: 20 INJECTION, SOLUTION INTRAVENOUS at 13:47

## 2024-08-07 NOTE — PROGRESS NOTES
Irene Plascencia  tolerated Venofer well with no complications.      Irene Plascencia is aware she does not have any future appts scheduled    AVS printed: No (Declined by Irene Plascencia)

## 2024-08-14 DIAGNOSIS — R68.82 DECREASED LIBIDO: ICD-10-CM

## 2024-08-14 RX ORDER — BUPROPION HYDROCHLORIDE 75 MG/1
75 TABLET ORAL DAILY
Qty: 90 TABLET | Refills: 1 | Status: SHIPPED | OUTPATIENT
Start: 2024-08-14

## 2024-08-23 DIAGNOSIS — R05.9 COUGH, UNSPECIFIED TYPE: ICD-10-CM

## 2024-08-23 DIAGNOSIS — M54.16 LUMBAR RADICULOPATHY: ICD-10-CM

## 2024-08-23 RX ORDER — CELECOXIB 200 MG/1
200 CAPSULE ORAL DAILY
Qty: 30 CAPSULE | Refills: 5 | Status: SHIPPED | OUTPATIENT
Start: 2024-08-23

## 2024-08-26 ENCOUNTER — TELEPHONE (OUTPATIENT)
Age: 64
End: 2024-08-26

## 2024-08-26 RX ORDER — BENZONATATE 100 MG/1
100 CAPSULE ORAL 3 TIMES DAILY PRN
Qty: 20 CAPSULE | Refills: 3 | Status: SHIPPED | OUTPATIENT
Start: 2024-08-26

## 2024-09-05 ENCOUNTER — TELEPHONE (OUTPATIENT)
Age: 64
End: 2024-09-05

## 2024-09-05 NOTE — TELEPHONE ENCOUNTER
RN placed a call to patient to follow up on question. RN asked patient as to what kind of cancer her boyfriend has. Per patient, boyfriend has Lymphatic Cancer.     RN advised will discuss with provider for more advice. Also advised patient's boyfriend contacts his oncology provider with the same question as well to double check. Staff will return a call with OBGYN provider's recommendations. Pt verbalized an understanding. No further questions.

## 2024-09-05 NOTE — TELEPHONE ENCOUNTER
Pt called in, stated that her boyfriend has cancer and wanted to know precautions or will she get cancer during intercourse.

## 2024-09-13 ENCOUNTER — OFFICE VISIT (OUTPATIENT)
Dept: INTERNAL MEDICINE CLINIC | Facility: CLINIC | Age: 64
End: 2024-09-13
Payer: COMMERCIAL

## 2024-09-13 VITALS
WEIGHT: 185 LBS | HEIGHT: 62 IN | HEART RATE: 107 BPM | SYSTOLIC BLOOD PRESSURE: 158 MMHG | DIASTOLIC BLOOD PRESSURE: 100 MMHG | OXYGEN SATURATION: 95 % | BODY MASS INDEX: 34.04 KG/M2 | RESPIRATION RATE: 20 BRPM

## 2024-09-13 DIAGNOSIS — R05.9 COUGH, UNSPECIFIED TYPE: ICD-10-CM

## 2024-09-13 DIAGNOSIS — R11.2 NAUSEA AND VOMITING, UNSPECIFIED VOMITING TYPE: ICD-10-CM

## 2024-09-13 DIAGNOSIS — R05.1 ACUTE COUGH: Primary | ICD-10-CM

## 2024-09-13 PROCEDURE — G2211 COMPLEX E/M VISIT ADD ON: HCPCS | Performed by: INTERNAL MEDICINE

## 2024-09-13 PROCEDURE — 99214 OFFICE O/P EST MOD 30 MIN: CPT | Performed by: INTERNAL MEDICINE

## 2024-09-13 RX ORDER — BENZONATATE 100 MG/1
100 CAPSULE ORAL 3 TIMES DAILY PRN
Qty: 20 CAPSULE | Refills: 3 | Status: SHIPPED | OUTPATIENT
Start: 2024-09-13 | End: 2024-09-20

## 2024-09-13 RX ORDER — DOXYCYCLINE HYCLATE 100 MG
100 TABLET ORAL 2 TIMES DAILY
Qty: 14 TABLET | Refills: 0 | Status: SHIPPED | OUTPATIENT
Start: 2024-09-13 | End: 2024-09-20

## 2024-09-13 NOTE — ASSESSMENT & PLAN NOTE
"Currently reporting some \"tar-like\" material when she vomits.  Salena this could be consistent with adjusted blood, and to go to the emergency room if worsening, she does have follow-up EGD in a couple of weeks, and she does have a history of GI bleeding         "

## 2024-09-13 NOTE — ASSESSMENT & PLAN NOTE
SPECT the urinary incontinence to improve after the coughing improved, if not, can refer for further workup for the stress incontinence.  For the cough, discussed possibility of bronchitis versus COVID, with the onset of her symptoms being a couple weeks ago, will cover for atypical bronchitis with doxycycline.  Will also give Tessalon Perles to help with the cough follow up if not improving.    Orders:    doxycycline hyclate (VIBRA-TABS) 100 mg tablet; Take 1 tablet (100 mg total) by mouth 2 (two) times a day for 7 days

## 2024-09-13 NOTE — PATIENT INSTRUCTIONS
"Problem List Items Addressed This Visit          Digestive    Nausea and vomiting     Currently reporting some \"tar-like\" material when she vomits.  Salena this could be consistent with adjusted blood, and to go to the emergency room if worsening, she does have follow-up EGD in a couple of weeks, and she does have a history of GI bleeding            Other    Acute cough - Primary     SPECT the urinary incontinence to improve after the coughing improved, if not, can refer for further workup for the stress incontinence.  For the cough, discussed possibility of bronchitis versus COVID, with the onset of her symptoms being a couple weeks ago, will cover for atypical bronchitis with doxycycline.  Will also give Tessalon Perles to help with the cough follow up if not improving.         Relevant Medications    doxycycline hyclate (VIBRA-TABS) 100 mg tablet    benzonatate (TESSALON PERLES) 100 mg capsule     Other Visit Diagnoses       Cough, unspecified type        Relevant Medications    benzonatate (TESSALON PERLES) 100 mg capsule            "

## 2024-09-13 NOTE — PROGRESS NOTES
"Ambulatory Visit  Name: Irene Plascencia      : 1960      MRN: [id number]  Encounter Provider: Raheem Cain MD  Encounter Date: 2024   Encounter department: [facility]    Assessment & Plan  Nausea and vomiting, unspecified vomiting type  Currently reporting some \"tar-like\" material when she vomits.  Salena this could be consistent with adjusted blood, and to go to the emergency room if worsening, she does have follow-up EGD in a couple of weeks, and she does have a history of GI bleeding         Acute cough  SPECT the urinary incontinence to improve after the coughing improved, if not, can refer for further workup for the stress incontinence.  For the cough, discussed possibility of bronchitis versus COVID, with the onset of her symptoms being a couple weeks ago, will cover for atypical bronchitis with doxycycline.  Will also give Tessalon Perles to help with the cough follow up if not improving.    Orders:    doxycycline hyclate (VIBRA-TABS) 100 mg tablet; Take 1 tablet (100 mg total) by mouth 2 (two) times a day for 7 days    Cough, unspecified type    Orders:    benzonatate (TESSALON PERLES) 100 mg capsule; Take 1 capsule (100 mg total) by mouth 3 (three) times a day as needed for cough         History of Present Illness     Pt having coughing for about 3 weeks, and when she coughs she get some urinary leakage.  Also did have some sore throat last week.  Pt is also having some vomit of black material at night sometimes.  No black tarry stool.    Cough  Pertinent negatives include no chills, fever or sore throat.   Vomiting   Associated symptoms include coughing. Pertinent negatives include no chills or fever.     Review of Systems   Constitutional:  Negative for chills and fever.   HENT:  Negative for congestion and sore throat.    Respiratory:  Positive for cough.    Gastrointestinal:  Positive for vomiting.   Genitourinary:  Negative for dysuria, frequency and urgency. "   Psychiatric/Behavioral:  The patient is nervous/anxious.      Past Medical History:   Diagnosis Date    Anemia     Anxiety     Arthritis     Back pain at L4-L5 level     Schreiber esophagus     Bulimia     Colon polyp     Disease of thyroid gland     hypothyroid    GERD (gastroesophageal reflux disease)     Hearing loss in left ear     History of transfusion     Hyperlipidemia     Hypertension     Hypothyroidism     Leukopenia     NMS (neuroleptic malignant syndrome) 01/03/2020    Pneumonia     RA (rheumatoid arthritis) (HCC)     in feet    Sciatica     Seizure (HCC)     due to medication mix up 8/2018 only one historically    Skin spots, red     lower right arm - poss from cat- no s/s infection    Synovial cyst of lumbar spine     Vertigo     Wears dentures     full set    Weight gain      Past Surgical History:   Procedure Laterality Date    BONE MARROW BIOPSY      BREAST SURGERY      enlargement with implants    CLOSED REDUCTION DISTAL FEMUR FRACTURE      COLONOSCOPY      COSMETIC SURGERY      DENTAL SURGERY      HIP ARTHROPLASTY Right 01/02/2020    Procedure: REVISION TOTAL HIP ARTHROPLASTY WITH REPAIR OF PARIPROSTHETIC FRACTURE - POSTERIOR APPROACH;  Surgeon: Dilan Pedersen MD;  [facility];  Service: Orthopedics    NJ AMPUTATION METATARSAL W/TOE SINGLE Left 04/07/2023    Procedure: AMPUTATION TOE 4th;  Surgeon: Conner Bartlett DPM;  Location:  MAIN OR;  Service: Podiatry    NJ ARTHRP ACETBLR/PROX FEM PROSTC AGRFT/ALGRFT Right 12/11/2019    Procedure: ARTHROPLASTY HIP TOTAL ANTERIOR;  Surgeon: Dilan Pedersen MD;  Location:  MAIN OR;  Service: Orthopedics    NJ ESOPHAGOGASTRODUODENOSCOPY TRANSORAL DIAGNOSTIC N/A 05/11/2021    Procedure: ESOPHAGOGASTRODUODENOSCOPY (EGD);  Surgeon: David Cedeño MD;  Location:  MAIN OR;  Service: General    NJ LAPS RPR PARAESPHGL HRNA INCL FUNDPLSTY W/MESH N/A 05/11/2021    Procedure: REPAIR HERNIA PARAESOPHAGEAL  LAPAROSCOPIC;  Surgeon: David Cedeño MD;   [facility];  Service: General    MO UNLISTED PROCEDURE ESOPHAGUS N/A 05/11/2021    Procedure: FUNDOPLICATION TRANSORAL INCISIONLESS;  Surgeon: Brad Cadet MD;  [facility];  Service: Gastroenterology    RHINOPLASTY      SINUS SURGERY      TOE AMPUTATION Left     2023 4th toe    TRANSORAL INCISIONLESS FUNDOPLICATION (TIF)  05/11/2021     Family History   Problem Relation Age of Onset    Uterine cancer Mother     Esophageal cancer Father     Colon cancer Maternal Grandmother      Social History     Tobacco Use    Smoking status: Never     Passive exposure: Past    Smokeless tobacco: Never   Vaping Use    Vaping status: Never Used   Substance and Sexual Activity    Alcohol use: Not Currently    Drug use: Not Currently    Sexual activity: Not Currently     Current Outpatient Medications on File Prior to Visit   Medication Sig    amLODIPine (NORVASC) 5 mg tablet TAKE 1 TABLET (5 MG TOTAL) BY MOUTH DAILY.    levothyroxine 25 mcg tablet TAKE 1 TABLET BY MOUTH EVERY DAY    LORazepam (ATIVAN) 2 mg tablet Take 1 tablet (2 mg total) by mouth every 4 (four) hours as needed for anxiety    pantoprazole (PROTONIX) 40 mg tablet TAKE 1 TABLET BY MOUTH TWICE A DAY    PARoxetine (PAXIL) 30 mg tablet TAKE 2 TABLETS (60 MG TOTAL) BY MOUTH IN THE MORNING    QUEtiapine (SEROquel XR) 400 mg 24 hr tablet TAKE 1 TABLET (400 MG TOTAL) BY MOUTH DAILY AT BEDTIME    sucralfate (CARAFATE) 1 g tablet TAKE 1 TABLET (1 G TOTAL) BY MOUTH 3 (THREE) TIMES A DAY WITH MEALS    ziprasidone (GEODON) 80 mg capsule TAKE 1 CAPSULE BY MOUTH EVERY DAY    buPROPion (WELLBUTRIN) 75 mg tablet TAKE 1 TABLET (75 MG TOTAL) BY MOUTH IN THE MORNING (Patient not taking: Reported on 8/27/2024)    celecoxib (CeleBREX) 200 mg capsule Take 1 capsule (200 mg total) by mouth daily (Patient not taking: Reported on 9/13/2024)    [facility] Vitamin B-12 1000 MCG tablet TAKE 1 TABLET BY MOUTH EVERY DAY (Patient not taking: Reported on 5/16/2024)    dicyclomine  (BENTYL) 20 mg tablet Take 1 tablet (20 mg total) by mouth 2 (two) times a day (Patient not taking: Reported on 8/27/2024)    EPINEPHrine (EPIPEN) 0.3 mg/0.3 mL SOAJ Inject 0.3 mL (0.3 mg total) into a muscle once for 1 dose (Patient not taking: Reported on 9/13/2024)    famotidine (PEPCID) 40 MG tablet Take 1 tablet (40 mg total) by mouth 2 (two) times a day (Patient not taking: Reported on 9/13/2024)    ferrous sulfate 325 (65 Fe) mg tablet Take 1 tablet (325 mg total) by mouth daily with breakfast (Patient not taking: Reported on 5/16/2024)    folic acid (FOLVITE) 1 mg tablet TAKE 1 TABLET BY MOUTH EVERY DAY (Patient not taking: Reported on 8/27/2024)    meclizine (ANTIVERT) 25 mg tablet Take 1 tablet (25 mg total) by mouth 3 (three) times a day as needed for dizziness (Patient not taking: Reported on 9/13/2024)    ondansetron (ZOFRAN) 4 mg tablet Take 1 tablet (4 mg total) by mouth every 6 (six) hours (Patient not taking: Reported on 9/13/2024)    sodium chloride 1 g tablet Take 2 tablets (2 g total) by mouth 2 (two) times a day    triamcinolone (KENALOG) 0.1 % cream APPLY TOPICALLY 2 TIMES A DAY AS NEEDED FOR RASH. (Patient not taking: Reported on 9/13/2024)    [DISCONTINUED] benzonatate (TESSALON PERLES) 100 mg capsule Take 1 capsule (100 mg total) by mouth 3 (three) times a day as needed for cough (Patient not taking: Reported on 9/13/2024)     Allergies   Allergen Reactions    Latex Anaphylaxis, Rash and Tongue Swelling     Band aids, adhesives wears normal underwear, can eat bananas  USE PAPER TAPE    Risperdal [Risperidone] Shortness Of Breath     Rapid heart beat, SOB    Zyprexa [Olanzapine] Shortness Of Breath     Rapid heartbeat     Immunization History   Administered Date(s) Administered    COVID-19 PFIZER VACCINE 0.3 ML IM 03/20/2021, 04/10/2021, 10/09/2021    COVID-19 Pfizer Vac BIVALENT Jt-sucrose 12 Yr+ IM 09/10/2022    COVID-19 Pfizer mRNA vacc PF jt-sucrose 12 yr and older (Comirnaty)  "10/04/2023    COVID-19 Pfizer vac (Jt-sucrose, gray cap) 12 yr+ IM 05/30/2022    H1N1, All Formulations 11/17/2009    INFLUENZA 11/17/2009, 01/04/2013, 11/11/2014, 10/20/2015, 10/31/2016, 09/16/2018, 11/07/2022    Influenza Injectable, MDCK, Preservative Free, Quadrivalent, 0.5 mL 09/26/2019    Influenza Quadrivalent, 6-35 Months IM 10/31/2016    Influenza, injectable, quadrivalent, preservative free 0.5 mL 09/05/2020, 09/26/2023    Influenza, recombinant, quadrivalent,injectable, preservative free 11/04/2021    Pneumococcal Conjugate 13-Valent 09/16/2018    Pneumococcal Conjugate Vaccine 20-valent (Pcv20), Polysace 04/26/2024    Rabies 07/29/2014, 08/01/2014, 08/05/2014    Tdap 07/29/2014, 12/17/2022, 06/16/2023     Objective     /100 (BP Location: Left arm, Patient Position: Sitting, Cuff Size: Standard)   Pulse (!) 107   Resp 20   Ht 5' 2\" (1.575 m)   Wt 83.9 kg (185 lb)   SpO2 95%   BMI 33.84 kg/m²     Physical Exam  Constitutional:       General: She is not in acute distress.     Appearance: Normal appearance.   Pulmonary:      Effort: Pulmonary effort is normal. No respiratory distress.      Breath sounds: Normal breath sounds. No stridor. No wheezing, rhonchi or rales.   Neurological:      Mental Status: She is alert.         "

## 2024-09-16 ENCOUNTER — APPOINTMENT (EMERGENCY)
Dept: RADIOLOGY | Facility: HOSPITAL | Age: 64
DRG: 377 | End: 2024-09-16
Payer: COMMERCIAL

## 2024-09-16 ENCOUNTER — HOSPITAL ENCOUNTER (INPATIENT)
Facility: HOSPITAL | Age: 64
LOS: 4 days | Discharge: HOME | DRG: 377 | End: 2024-09-20
Attending: EMERGENCY MEDICINE | Admitting: INTERNAL MEDICINE
Payer: COMMERCIAL

## 2024-09-16 DIAGNOSIS — K20.90 ESOPHAGITIS: ICD-10-CM

## 2024-09-16 DIAGNOSIS — K92.0 HEMATEMESIS: Primary | ICD-10-CM

## 2024-09-16 PROBLEM — J69.0 ASPIRATION PNEUMONIA (HCC): Status: ACTIVE | Noted: 2024-09-16

## 2024-09-16 LAB
2HR DELTA HS TROPONIN: 0 NG/L
ABO GROUP BLD: NORMAL
ALBUMIN SERPL BCG-MCNC: 4.1 G/DL (ref 3.5–5)
ALP SERPL-CCNC: 72 U/L (ref 34–104)
ALT SERPL W P-5'-P-CCNC: 31 U/L (ref 7–52)
ANION GAP SERPL CALCULATED.3IONS-SCNC: 12 MMOL/L (ref 4–13)
ANION GAP SERPL CALCULATED.3IONS-SCNC: 6 MMOL/L (ref 4–13)
AST SERPL W P-5'-P-CCNC: 34 U/L (ref 13–39)
BASOPHILS # BLD MANUAL: 0 THOUSAND/UL (ref 0–0.1)
BASOPHILS NFR MAR MANUAL: 0 % (ref 0–1)
BILIRUB SERPL-MCNC: 0.66 MG/DL (ref 0.2–1)
BLD GP AB SCN SERPL QL: NEGATIVE
BUN SERPL-MCNC: 6 MG/DL (ref 5–25)
BUN SERPL-MCNC: 8 MG/DL (ref 5–25)
CALCIUM SERPL-MCNC: 8.9 MG/DL (ref 8.4–10.2)
CALCIUM SERPL-MCNC: 9.4 MG/DL (ref 8.4–10.2)
CARDIAC TROPONIN I PNL SERPL HS: 3 NG/L
CARDIAC TROPONIN I PNL SERPL HS: 3 NG/L
CHLORIDE SERPL-SCNC: 88 MMOL/L (ref 96–108)
CHLORIDE SERPL-SCNC: 94 MMOL/L (ref 96–108)
CO2 SERPL-SCNC: 27 MMOL/L (ref 21–32)
CO2 SERPL-SCNC: 30 MMOL/L (ref 21–32)
CREAT SERPL-MCNC: 0.59 MG/DL (ref 0.6–1.3)
CREAT SERPL-MCNC: 0.77 MG/DL (ref 0.6–1.3)
EOSINOPHIL # BLD MANUAL: 0 THOUSAND/UL (ref 0–0.4)
EOSINOPHIL NFR BLD MANUAL: 0 % (ref 0–6)
ERYTHROCYTE [DISTWIDTH] IN BLOOD BY AUTOMATED COUNT: 15.9 % (ref 11.6–15.1)
FLUAV RNA RESP QL NAA+PROBE: NEGATIVE
FLUBV RNA RESP QL NAA+PROBE: NEGATIVE
GFR SERPL CREATININE-BSD FRML MDRD: 81 ML/MIN/1.73SQ M
GFR SERPL CREATININE-BSD FRML MDRD: 97 ML/MIN/1.73SQ M
GLUCOSE SERPL-MCNC: 114 MG/DL (ref 65–140)
GLUCOSE SERPL-MCNC: 97 MG/DL (ref 65–140)
HCT VFR BLD AUTO: 32.9 % (ref 34.8–46.1)
HCT VFR BLD AUTO: 33.8 % (ref 34.8–46.1)
HCT VFR BLD AUTO: 38.3 % (ref 34.8–46.1)
HGB BLD-MCNC: 11.2 G/DL (ref 11.5–15.4)
HGB BLD-MCNC: 11.3 G/DL (ref 11.5–15.4)
HGB BLD-MCNC: 13.3 G/DL (ref 11.5–15.4)
INR PPP: 1.06 (ref 0.85–1.19)
L PNEUMO1 AG UR QL IA.RAPID: NEGATIVE
LYMPHOCYTES # BLD AUTO: 0.62 THOUSAND/UL (ref 0.6–4.47)
LYMPHOCYTES # BLD AUTO: 5 % (ref 14–44)
MCH RBC QN AUTO: 28.4 PG (ref 26.8–34.3)
MCHC RBC AUTO-ENTMCNC: 34.7 G/DL (ref 31.4–37.4)
MCV RBC AUTO: 82 FL (ref 82–98)
METAMYELOCYTE ABSOLUTE CT: 0.74 THOUSAND/UL (ref 0–0.1)
METAMYELOCYTES NFR BLD MANUAL: 6 % (ref 0–1)
MONOCYTES # BLD AUTO: 0.25 THOUSAND/UL (ref 0–1.22)
MONOCYTES NFR BLD: 2 % (ref 4–12)
NEUTROPHILS # BLD MANUAL: 10.77 THOUSAND/UL (ref 1.85–7.62)
NEUTS BAND NFR BLD MANUAL: 26 % (ref 0–8)
NEUTS SEG NFR BLD AUTO: 61 % (ref 43–75)
PLATELET # BLD AUTO: 325 THOUSANDS/UL (ref 149–390)
PLATELET BLD QL SMEAR: ADEQUATE
PMV BLD AUTO: 8.3 FL (ref 8.9–12.7)
POTASSIUM SERPL-SCNC: 3 MMOL/L (ref 3.5–5.3)
POTASSIUM SERPL-SCNC: 3.4 MMOL/L (ref 3.5–5.3)
PROT SERPL-MCNC: 6.8 G/DL (ref 6.4–8.4)
PROTHROMBIN TIME: 14.1 SECONDS (ref 12.3–15)
RBC # BLD AUTO: 4.69 MILLION/UL (ref 3.81–5.12)
RH BLD: POSITIVE
RSV RNA RESP QL NAA+PROBE: NEGATIVE
S PNEUM AG UR QL: NEGATIVE
SARS-COV-2 RNA RESP QL NAA+PROBE: NEGATIVE
SODIUM SERPL-SCNC: 127 MMOL/L (ref 135–147)
SODIUM SERPL-SCNC: 130 MMOL/L (ref 135–147)
SPECIMEN EXPIRATION DATE: NORMAL
WBC # BLD AUTO: 12.38 THOUSAND/UL (ref 4.31–10.16)

## 2024-09-16 PROCEDURE — 80048 BASIC METABOLIC PNL TOTAL CA: CPT | Performed by: INTERNAL MEDICINE

## 2024-09-16 PROCEDURE — 87449 NOS EACH ORGANISM AG IA: CPT | Performed by: INTERNAL MEDICINE

## 2024-09-16 PROCEDURE — 74177 CT ABD & PELVIS W/CONTRAST: CPT

## 2024-09-16 PROCEDURE — 86850 RBC ANTIBODY SCREEN: CPT

## 2024-09-16 PROCEDURE — 85018 HEMOGLOBIN: CPT | Performed by: INTERNAL MEDICINE

## 2024-09-16 PROCEDURE — 36415 COLL VENOUS BLD VENIPUNCTURE: CPT

## 2024-09-16 PROCEDURE — 96361 HYDRATE IV INFUSION ADD-ON: CPT

## 2024-09-16 PROCEDURE — 85610 PROTHROMBIN TIME: CPT

## 2024-09-16 PROCEDURE — 84484 ASSAY OF TROPONIN QUANT: CPT | Performed by: EMERGENCY MEDICINE

## 2024-09-16 PROCEDURE — 93005 ELECTROCARDIOGRAM TRACING: CPT

## 2024-09-16 PROCEDURE — 96375 TX/PRO/DX INJ NEW DRUG ADDON: CPT

## 2024-09-16 PROCEDURE — 0241U HB NFCT DS VIR RESP RNA 4 TRGT: CPT | Performed by: INTERNAL MEDICINE

## 2024-09-16 PROCEDURE — 86901 BLOOD TYPING SEROLOGIC RH(D): CPT

## 2024-09-16 PROCEDURE — 99291 CRITICAL CARE FIRST HOUR: CPT | Performed by: EMERGENCY MEDICINE

## 2024-09-16 PROCEDURE — 85007 BL SMEAR W/DIFF WBC COUNT: CPT | Performed by: EMERGENCY MEDICINE

## 2024-09-16 PROCEDURE — 86900 BLOOD TYPING SEROLOGIC ABO: CPT

## 2024-09-16 PROCEDURE — 80053 COMPREHEN METABOLIC PANEL: CPT | Performed by: EMERGENCY MEDICINE

## 2024-09-16 PROCEDURE — 96365 THER/PROPH/DIAG IV INF INIT: CPT

## 2024-09-16 PROCEDURE — 99285 EMERGENCY DEPT VISIT HI MDM: CPT

## 2024-09-16 PROCEDURE — 85027 COMPLETE CBC AUTOMATED: CPT | Performed by: EMERGENCY MEDICINE

## 2024-09-16 PROCEDURE — 71045 X-RAY EXAM CHEST 1 VIEW: CPT

## 2024-09-16 PROCEDURE — 99223 1ST HOSP IP/OBS HIGH 75: CPT | Performed by: INTERNAL MEDICINE

## 2024-09-16 PROCEDURE — 85014 HEMATOCRIT: CPT | Performed by: INTERNAL MEDICINE

## 2024-09-16 PROCEDURE — 96374 THER/PROPH/DIAG INJ IV PUSH: CPT

## 2024-09-16 RX ORDER — LEVOTHYROXINE SODIUM 25 UG/1
25 TABLET ORAL
Status: DISCONTINUED | OUTPATIENT
Start: 2024-09-17 | End: 2024-09-20 | Stop reason: HOSPADM

## 2024-09-16 RX ORDER — PANTOPRAZOLE SODIUM 40 MG/10ML
40 INJECTION, POWDER, LYOPHILIZED, FOR SOLUTION INTRAVENOUS EVERY 12 HOURS SCHEDULED
Status: DISCONTINUED | OUTPATIENT
Start: 2024-09-16 | End: 2024-09-16

## 2024-09-16 RX ORDER — PAROXETINE 20 MG/1
60 TABLET, FILM COATED ORAL DAILY
Status: DISCONTINUED | OUTPATIENT
Start: 2024-09-17 | End: 2024-09-20 | Stop reason: HOSPADM

## 2024-09-16 RX ORDER — SODIUM CHLORIDE, SODIUM GLUCONATE, SODIUM ACETATE, POTASSIUM CHLORIDE, MAGNESIUM CHLORIDE, SODIUM PHOSPHATE, DIBASIC, AND POTASSIUM PHOSPHATE .53; .5; .37; .037; .03; .012; .00082 G/100ML; G/100ML; G/100ML; G/100ML; G/100ML; G/100ML; G/100ML
75 INJECTION, SOLUTION INTRAVENOUS CONTINUOUS
Status: DISCONTINUED | OUTPATIENT
Start: 2024-09-16 | End: 2024-09-20 | Stop reason: HOSPADM

## 2024-09-16 RX ORDER — ONDANSETRON 2 MG/ML
4 INJECTION INTRAMUSCULAR; INTRAVENOUS ONCE
Status: COMPLETED | OUTPATIENT
Start: 2024-09-16 | End: 2024-09-16

## 2024-09-16 RX ORDER — PANTOPRAZOLE SODIUM 40 MG/10ML
40 INJECTION, POWDER, LYOPHILIZED, FOR SOLUTION INTRAVENOUS EVERY 12 HOURS SCHEDULED
Status: DISCONTINUED | OUTPATIENT
Start: 2024-09-16 | End: 2024-09-17

## 2024-09-16 RX ORDER — SUCRALFATE 1 G/1
1 TABLET ORAL
Status: DISCONTINUED | OUTPATIENT
Start: 2024-09-16 | End: 2024-09-20 | Stop reason: HOSPADM

## 2024-09-16 RX ORDER — PANTOPRAZOLE SODIUM 40 MG/10ML
40 INJECTION, POWDER, LYOPHILIZED, FOR SOLUTION INTRAVENOUS ONCE
Status: COMPLETED | OUTPATIENT
Start: 2024-09-16 | End: 2024-09-16

## 2024-09-16 RX ORDER — ONDANSETRON 2 MG/ML
4 INJECTION INTRAMUSCULAR; INTRAVENOUS EVERY 6 HOURS PRN
Status: DISCONTINUED | OUTPATIENT
Start: 2024-09-16 | End: 2024-09-20 | Stop reason: HOSPADM

## 2024-09-16 RX ORDER — ZIPRASIDONE HYDROCHLORIDE 40 MG/1
80 CAPSULE ORAL DAILY
Status: DISCONTINUED | OUTPATIENT
Start: 2024-09-17 | End: 2024-09-20 | Stop reason: HOSPADM

## 2024-09-16 RX ORDER — ACETAMINOPHEN 325 MG/1
650 TABLET ORAL EVERY 6 HOURS PRN
Status: DISCONTINUED | OUTPATIENT
Start: 2024-09-16 | End: 2024-09-20 | Stop reason: HOSPADM

## 2024-09-16 RX ORDER — SODIUM CHLORIDE 1 G/1
2 TABLET ORAL 2 TIMES DAILY
Status: DISCONTINUED | OUTPATIENT
Start: 2024-09-16 | End: 2024-09-20 | Stop reason: HOSPADM

## 2024-09-16 RX ORDER — METOCLOPRAMIDE HYDROCHLORIDE 5 MG/ML
5 INJECTION INTRAMUSCULAR; INTRAVENOUS
Status: DISCONTINUED | OUTPATIENT
Start: 2024-09-16 | End: 2024-09-17

## 2024-09-16 RX ORDER — LORAZEPAM 1 MG/1
2 TABLET ORAL EVERY 4 HOURS PRN
Status: DISCONTINUED | OUTPATIENT
Start: 2024-09-16 | End: 2024-09-20 | Stop reason: HOSPADM

## 2024-09-16 RX ORDER — POTASSIUM CHLORIDE 14.9 MG/ML
20 INJECTION INTRAVENOUS ONCE
Status: COMPLETED | OUTPATIENT
Start: 2024-09-16 | End: 2024-09-17

## 2024-09-16 RX ADMIN — SUCRALFATE 1 G: 1 TABLET ORAL at 17:31

## 2024-09-16 RX ADMIN — CEFTRIAXONE SODIUM 2000 MG: 10 INJECTION, POWDER, FOR SOLUTION INTRAVENOUS at 10:13

## 2024-09-16 RX ADMIN — POTASSIUM CHLORIDE 20 MEQ: 14.9 INJECTION, SOLUTION INTRAVENOUS at 18:04

## 2024-09-16 RX ADMIN — SODIUM CHLORIDE, SODIUM GLUCONATE, SODIUM ACETATE, POTASSIUM CHLORIDE, MAGNESIUM CHLORIDE, SODIUM PHOSPHATE, DIBASIC, AND POTASSIUM PHOSPHATE 75 ML/HR: .53; .5; .37; .037; .03; .012; .00082 INJECTION, SOLUTION INTRAVENOUS at 11:38

## 2024-09-16 RX ADMIN — PANTOPRAZOLE SODIUM 40 MG: 40 INJECTION, POWDER, FOR SOLUTION INTRAVENOUS at 22:02

## 2024-09-16 RX ADMIN — SODIUM CHLORIDE 1000 ML: 0.9 INJECTION, SOLUTION INTRAVENOUS at 08:40

## 2024-09-16 RX ADMIN — METOCLOPRAMIDE HYDROCHLORIDE 5 MG: 5 INJECTION INTRAMUSCULAR; INTRAVENOUS at 18:04

## 2024-09-16 RX ADMIN — ONDANSETRON 4 MG: 2 INJECTION INTRAMUSCULAR; INTRAVENOUS at 10:20

## 2024-09-16 RX ADMIN — PANTOPRAZOLE SODIUM 40 MG: 40 INJECTION, POWDER, FOR SOLUTION INTRAVENOUS at 08:40

## 2024-09-16 RX ADMIN — IOHEXOL 100 ML: 350 INJECTION, SOLUTION INTRAVENOUS at 08:53

## 2024-09-16 NOTE — ED NOTES
Pt placed on 2L of o2 via nasal cannula due to decreasing sats     Kristel Wesley RN  09/16/24 0802

## 2024-09-16 NOTE — CONSULTS
Consultation -  [facility]  Irene Plascencia 64 y.o. female MRN: [id number]  Unit/Bed#: [facility] 205-01 Encounter: [id number]        Inpatient consult to gastroenterology  Consult performed by: Stefani Flynn MD  Consult ordered by: July Desai MD          Reason for Consult / Principal Problem:     Hematemesis      ASSESSMENT AND PLAN:      Irene Plascencia is a 64 y.o. old female with PMH of severe gastroparesis, hiatal hernia s/p TIF in 2021, GERD with esophagitis, Schreiber's esophagus who stated was admitted to the hospital on 9/16 with complaints of coffee-ground emesis ongoing for the past 2 weeks.  No further vomiting episodes since admission on initial presentation hemoglobin was around her baseline at 13 and on recheck several hours later, he had decreased to 11 without any signs of overt GI bleeding.  Suspects dilutional anemia in the setting of IV fluid resuscitation    #Reported Coffee-ground emesis  #History of GERD with esophagitis  #History of right shoulder hernia s/p TIF procedure  #History of CVA gastroparesis  #Acute hypoxic respiratory failure possibly secondary to pneumonia      Plan  - Possible source of bleeding including esophagitis, gastritis, peptic ulcer disease or Dieulafoy lesion.  - Agree with Protonix 40 mg IV twice daily at home dose Carafate 3 times daily  -Trend CBC daily.  Obtain type and screen transfuse to keep hemoglobin >7  - If patient has another episode of vomiting, please take a picture and placed in the chart.  - If she has another episode of coffee-ground emesis while admitted and/or hemoglobin continues to drop with signs of GI bleeding and without any other explanation, will plan for inpatient EGD if respiratory status optimized.  Otherwise if she remains stable, without any further bleeding, will plan for her scheduled outpatient EGD on 9/26  - She does have severe gastroparesis which is likely precipitating recurrent episodes of  vomiting.  Recommend to check EKG and if QTc is within normal limits, start Reglan 5mg TID IV prior to meal  - Maintain 2 large-bore IV  - Agree with further evaluation and treatment of respiratory failure and pneumonia. Could be aspiration PNA in setting on nausea/vomiting.        Rest of care per primary team.  Thank you for this consultation.   ______________________________________________________________________    HPI:  Irene Plascencia is a 63yo F with PMH of gastroparesis, hitial hernia s/p TIF, GERD with esophagitis, Schreiber's esophagus presented to the hospital with c/o coffee ground emesis and abdominal pain.    She reports almost daily episodes of nausea and coffee-ground emesis ongoing for the past 2 weeks.  She denies any worsening of her baseline GERD symptoms, abdominal pain fever, chills.  She reports compliance with her GERD regimen and denies any recent NSAID, antiplatelet or anticoagulant use. Afebrile and Hemodynamically stable but with hypoxia placed on 2L NC. Lab significant for hyponatremia 127 and hypokalemia 3.4. Hgb 13.3 (baseline 10-13), WBC 12.3 with bandemia. CT abdomen and pelvis showed no acute intra-abdominal process or evidence of bleeding but did show left lung consolidation suggestive of pneumonia as well as distal esophageal circumferential thickening.  Last EGD performed in December 2022 showed LA grade B esophagitis with possible Schreiber's esophagus. In April 2022, she has LA grade D esophagitis with liner esophageal ulcers and C1M3 Schreiber's esophagus not biopsied due to severe esophagitis.  At her last office visit in June 2024, she was continued on Protonix 40 mg twice a day, Pepcid 40 mg daily as well as Carafate TID with plan for repeat EGD for further evaluation of esophagitis on 9/26.  The plan at that time was that if her EGD was unrevealing, then they would consider referral to [facility] for further evaluation for a gastric pacemaker versus GPOEM.        REVIEW OF  SYSTEMS:    Review of Systems   Constitutional:  Negative for chills and fever.   HENT:  Negative for ear pain and sore throat.    Eyes:  Negative for pain and visual disturbance.   Respiratory:  Negative for cough and shortness of breath.    Cardiovascular:  Negative for chest pain and palpitations.   Gastrointestinal:  Positive for nausea. Negative for abdominal pain and vomiting.   Genitourinary:  Negative for dysuria and hematuria.   Musculoskeletal:  Negative for arthralgias and back pain.   Skin:  Negative for color change and rash.   Neurological:  Negative for seizures and syncope.   All other systems reviewed and are negative.         Historical Information   Past Medical History:   Diagnosis Date    Anemia     Anxiety     Arthritis     Back pain at L4-L5 level     Schreiber esophagus     Bulimia     Colon polyp     Disease of thyroid gland     hypothyroid    GERD (gastroesophageal reflux disease)     Hearing loss in left ear     History of transfusion     Hyperlipidemia     Hypertension     Hypothyroidism     Leukopenia     NMS (neuroleptic malignant syndrome) 01/03/2020    Pneumonia     RA (rheumatoid arthritis) (HCC)     in feet    Sciatica     Seizure (HCC)     due to medication mix up 8/2018 only one historically    Skin spots, red     lower right arm - poss from cat- no s/s infection    Synovial cyst of lumbar spine     Vertigo     Wears dentures     full set    Weight gain      Past Surgical History:   Procedure Laterality Date    BONE MARROW BIOPSY      BREAST SURGERY      enlargement with implants    CLOSED REDUCTION DISTAL FEMUR FRACTURE      COLONOSCOPY      COSMETIC SURGERY      DENTAL SURGERY      HIP ARTHROPLASTY Right 01/02/2020    Procedure: REVISION TOTAL HIP ARTHROPLASTY WITH REPAIR OF PARIPROSTHETIC FRACTURE - POSTERIOR APPROACH;  Surgeon: Dilan Pedersen MD;  [facility];  Service: Orthopedics    MA AMPUTATION METATARSAL W/TOE SINGLE Left 04/07/2023    Procedure: AMPUTATION TOE  4th;  Surgeon: Conner Bartlett DPM;  Location:  MAIN OR;  Service: Podiatry    PA ARTHRP ACETBLR/PROX FEM PROSTC AGRFT/ALGRFT Right 12/11/2019    Procedure: ARTHROPLASTY HIP TOTAL ANTERIOR;  Surgeon: Dilan Pedersen MD;  [facility];  Service: Orthopedics    PA ESOPHAGOGASTRODUODENOSCOPY TRANSORAL DIAGNOSTIC N/A 05/11/2021    Procedure: ESOPHAGOGASTRODUODENOSCOPY (EGD);  Surgeon: David Cedeño MD;  [facility];  Service: General    PA LAPS RPR PARAESPHGL HRNA INCL FUNDPLSTY W/MESH N/A 05/11/2021    Procedure: REPAIR HERNIA PARAESOPHAGEAL  LAPAROSCOPIC;  Surgeon: David Cedeño MD;  [facility];  Service: General    PA UNLISTED PROCEDURE ESOPHAGUS N/A 05/11/2021    Procedure: FUNDOPLICATION TRANSORAL INCISIONLESS;  Surgeon: Brad Cadet MD;  [facility];  Service: Gastroenterology    RHINOPLASTY      SINUS SURGERY      TOE AMPUTATION Left     2023 4th toe    TRANSORAL INCISIONLESS FUNDOPLICATION (TIF)  05/11/2021     Social History   Social History     Substance and Sexual Activity   Alcohol Use Not Currently     Social History     Substance and Sexual Activity   Drug Use Not Currently     Social History     Tobacco Use   Smoking Status Never    Passive exposure: Past   Smokeless Tobacco Never     Family History   Problem Relation Age of Onset    Uterine cancer Mother     Esophageal cancer Father     Colon cancer Maternal Grandmother        Meds/Allergies       Medications Prior to Admission:     amLODIPine (NORVASC) 5 mg tablet    celecoxib (CeleBREX) 200 mg capsule    doxycycline hyclate (VIBRA-TABS) 100 mg tablet    levothyroxine 25 mcg tablet    LORazepam (ATIVAN) 2 mg tablet    pantoprazole (PROTONIX) 40 mg tablet    PARoxetine (PAXIL) 30 mg tablet    QUEtiapine (SEROquel XR) 400 mg 24 hr tablet    sucralfate (CARAFATE) 1 g tablet    ziprasidone (GEODON) 80 mg capsule    benzonatate (TESSALON PERLES) 100 mg capsule    buPROPion (WELLBUTRIN) 75 mg tablet    [employer] Vitamin  "B-12 1000 MCG tablet    dicyclomine (BENTYL) 20 mg tablet    EPINEPHrine (EPIPEN) 0.3 mg/0.3 mL SOAJ    famotidine (PEPCID) 40 MG tablet    ferrous sulfate 325 (65 Fe) mg tablet    folic acid (FOLVITE) 1 mg tablet    meclizine (ANTIVERT) 25 mg tablet    ondansetron (ZOFRAN) 4 mg tablet    sodium chloride 1 g tablet    triamcinolone (KENALOG) 0.1 % cream    Current Facility-Administered Medications:     acetaminophen (TYLENOL) tablet 650 mg, Q6H PRN    [START ON 9/17/2024] cefTRIAXone (ROCEPHIN) 2,000 mg in dextrose 5 % 50 mL IVPB, Q24H    [START ON 9/17/2024] levothyroxine tablet 25 mcg, Early Morning    LORazepam (ATIVAN) tablet 2 mg, Q4H PRN    multi-electrolyte (PLASMALYTE-A/ISOLYTE-S PH 7.4) IV solution, Continuous, Last Rate: 75 mL/hr (09/16/24 1138)    ondansetron (ZOFRAN) injection 4 mg, Q6H PRN    pantoprazole (PROTONIX) injection 40 mg, Q12H YUMIKO    [START ON 9/17/2024] PARoxetine (PAXIL) tablet 60 mg, Daily    sodium chloride tablet 2 g, BID    sucralfate (CARAFATE) tablet 1 g, TID With Meals    [START ON 9/17/2024] ziprasidone (GEODON) capsule 80 mg, Daily    Allergies   Allergen Reactions    Latex Anaphylaxis, Rash and Tongue Swelling     Band aids, adhesives wears normal underwear, can eat bananas  USE PAPER TAPE    Risperdal [Risperidone] Shortness Of Breath     Rapid heart beat, SOB    Zyprexa [Olanzapine] Shortness Of Breath     Rapid heartbeat           Objective     Blood pressure 133/75, pulse 100, temperature 99.5 °F (37.5 °C), temperature source Oral, resp. rate 17, height 5' 2\" (1.575 m), weight 84.1 kg (185 lb 8 oz), SpO2 95%, not currently breastfeeding. Body mass index is 33.93 kg/m².      Intake/Output Summary (Last 24 hours) at 9/16/2024 1714  Last data filed at 9/16/2024 1600  Gross per 24 hour   Intake 1377.5 ml   Output 750 ml   Net 627.5 ml         PHYSICAL EXAM:      Physical Exam  Vitals and nursing note reviewed.   Constitutional:       General: She is not in acute distress.     " Appearance: She is well-developed.   HENT:      Head: Normocephalic and atraumatic.   Eyes:      Conjunctiva/sclera: Conjunctivae normal.   Cardiovascular:      Rate and Rhythm: Normal rate and regular rhythm.      Heart sounds: No murmur heard.  Pulmonary:      Effort: Pulmonary effort is normal. No respiratory distress.      Breath sounds: Normal breath sounds.   Abdominal:      General: There is no distension.      Palpations: Abdomen is soft.      Tenderness: There is no abdominal tenderness. There is no guarding or rebound.   Musculoskeletal:         General: No swelling.      Cervical back: Neck supple.   Skin:     General: Skin is warm and dry.      Capillary Refill: Capillary refill takes less than 2 seconds.   Neurological:      Mental Status: She is alert.   Psychiatric:         Mood and Affect: Mood normal.          Lab Results:   Admission on 09/16/2024   Component Date Value    WBC 09/16/2024 12.38 (H)     RBC 09/16/2024 4.69     Hemoglobin 09/16/2024 13.3     Hematocrit 09/16/2024 38.3     MCV 09/16/2024 82     MCH 09/16/2024 28.4     MCHC 09/16/2024 34.7     RDW 09/16/2024 15.9 (H)     MPV 09/16/2024 8.3 (L)     Platelets 09/16/2024 325     Sodium 09/16/2024 127 (L)     Potassium 09/16/2024 3.4 (L)     Chloride 09/16/2024 88 (L)     CO2 09/16/2024 27     ANION GAP 09/16/2024 12     BUN 09/16/2024 8     Creatinine 09/16/2024 0.77     Glucose 09/16/2024 114     Calcium 09/16/2024 9.4     AST 09/16/2024 34     ALT 09/16/2024 31     Alkaline Phosphatase 09/16/2024 72     Total Protein 09/16/2024 6.8     Albumin 09/16/2024 4.1     Total Bilirubin 09/16/2024 0.66     eGFR 09/16/2024 81     hs TnI 0hr 09/16/2024 3     hs TnI 2hr 09/16/2024 3     Delta 2hr hsTnI 09/16/2024 0     Protime 09/16/2024 14.1     INR 09/16/2024 1.06     Segmented % 09/16/2024 61     Bands % 09/16/2024 26 (H)     Lymphocytes % 09/16/2024 5 (L)     Monocytes % 09/16/2024 2 (L)     Eosinophils % 09/16/2024 0     Basophils % 09/16/2024  0     Metamyelocytes % 09/16/2024 6 (H)     Absolute Neutrophils 09/16/2024 10.77 (H)     Absolute Lymphocytes 09/16/2024 0.62     Absolute Monocytes 09/16/2024 0.25     Absolute Eosinophils 09/16/2024 0.00     Absolute Basophils 09/16/2024 0.00     Absolute Metamyelocytes 09/16/2024 0.74 (H)     Platelet Estimate 09/16/2024 Adequate     ABO Grouping 09/16/2024 O     Rh Factor 09/16/2024 Positive     Antibody Screen 09/16/2024 Negative     Specimen Expiration Date 09/16/2024 20240919     SARS-CoV-2 09/16/2024 Negative     INFLUENZA A PCR 09/16/2024 Negative     INFLUENZA B PCR 09/16/2024 Negative     RSV PCR 09/16/2024 Negative     Hemoglobin 09/16/2024 11.2 (L)     Hematocrit 09/16/2024 32.9 (L)     Hemoglobin 09/16/2024 11.3 (L)     Hematocrit 09/16/2024 33.8 (L)     Ventricular Rate 09/16/2024 86     Atrial Rate 09/16/2024 86     VA Interval 09/16/2024 133     QRSD Interval 09/16/2024 88     QT Interval 09/16/2024 363     QTC Interval 09/16/2024 435     P Axis 09/16/2024 70     QRS Axis 09/16/2024 36     T Wave [place] 09/16/2024 78        Imaging Studies: I have personally reviewed pertinent imaging studies.    Stefani Flynn MD  Gastroenterology Fellow  Available via EPIC Secure chat  9/16/2024 5:14 PM

## 2024-09-16 NOTE — ASSESSMENT & PLAN NOTE
Likely aspiration pneumonia from vomiting  Check COVID/RSV/Legionella/strep  Empiric antibiotic  Wean off oxygen as tolerated  Incentive spirometry  Aspiration precaution

## 2024-09-16 NOTE — ASSESSMENT & PLAN NOTE
Patient with history of esophagitis  Taking NSAIDs although taking PPI and sucralfate  Presented with coffee-ground emesis for 1 week  Start IV PPI, monitor H&H every 8  GI consulted-need for repeat EGD/colonoscopy  GI recommended Reglan 5 mg IV 3 times daily before meals if QTc is normal.  Will obtain EKG.  Hold Geodon while on Reglan to avoid QTc prolongation

## 2024-09-16 NOTE — ED NOTES
Pt incont of large amount if urine  Pt depends was soaked and had been on for some time  Pt states that she has no help at home     Kristel Wesley RN  09/16/24 [id number]

## 2024-09-16 NOTE — H&P
H&P - Hospitalist   Name: Irene Plascencia 64 y.o. female I MRN: [id number]  Unit/Bed#: [facility] 205-01 I Date of Admission: 9/16/2024   Date of Service: 9/16/2024 I Hospital Day: 0     Assessment & Plan  Upper GI bleed  Patient with history of esophagitis  Taking NSAIDs although taking PPI and sucralfate  Presented with coffee-ground emesis for 1 week  Start IV PPI, monitor H&H every 8  GI consulted-need for repeat EGD/colonoscopy  GI recommended Reglan 5 mg IV 3 times daily before meals if QTc is normal.  Will obtain EKG.  Hold Geodon while on Reglan to avoid QTc prolongation  Aspiration pneumonia (HCC)  Likely aspiration pneumonia from vomiting  Check COVID/RSV/Legionella/strep  Empiric antibiotic  Wean off oxygen as tolerated  Incentive spirometry  Aspiration precaution  Acquired hypothyroidism  Continue Synthroid  Schizoaffective disorder (HCC)  .  PTA home meds  Esophagitis  On Protonix sucralfate  GI on board-May need repeat EGD  Hyponatremia  History of hyponatremia on salt tablets  Monitor sodium closely  No acute altered mental status    VTE Pharmacologic Prophylaxis:   Moderate Risk (Score 3-4) - Pharmacological DVT Prophylaxis Contraindicated. Sequential Compression Devices Ordered.  Code Status: Level 1 - Full Code   Discussion with family: Patient declined call to .     Anticipated Length of Stay: Patient will be admitted on an inpatient basis with an anticipated length of stay of greater than 2 midnights secondary to gi bleed.    History of Present Illness   Chief Complaint: Coffee-ground vomiting    Irene Plascencia is a 63yo F with PMH of gastroparesis, hitial hernia s/p TIF, GERD with esophagitis, Schreiber's esophagus presented to the hospital with c/o coffee ground emesis and abdominal pain ongoing for about 1 week.  Patient states taking all her home medication but also seems to be taking NSAIDs.  She waited all digits but today it cannot take it anymore and she came to the  "hospital  She denies any fever, chest pain, shortness of breath or cough.  She denies any urinary complaints.  Does state her stool seems black.  No weakness of upper or lower extremities.  Able to ambulate independently, states lives alone.    In ER initial workup showed hyponatremia she does have chronic hyponatremia.  Hypokalemia.  Anemia, mild leukocytosis.  CT abdomen pelvis done that showed left lung consolidation suggestive of pneumonia as well as distal esophageal circumferential thickening suggestive of esophagitis..  No acute intra-abdominal process.    GI was consulted from ER.  SLIm for further  medical management  Review of Systems   Constitutional: Negative.    HENT: Negative.     Eyes: Negative.    Respiratory: Negative.     Cardiovascular: Negative.    Gastrointestinal:  Positive for abdominal pain, nausea and vomiting.   Genitourinary: Negative.    Musculoskeletal: Negative.    Skin: Negative.    Neurological: Negative.    Psychiatric/Behavioral:  The patient is nervous/anxious.        I have reviewed the patient's PMH, PSH, Social History, Family History, Meds, and Allergies  Social History:  Marital Status: Single   Occupation:   Patient Pre-hospital Living Situation: Alone  Patient Pre-hospital Level of Mobility: walks  Patient Pre-hospital Diet Restrictions:     Objective     Vitals:   Blood Pressure: 133/75 (09/16/24 1526)  Pulse: 100 (09/16/24 1526)  Temperature: 99.5 °F (37.5 °C) (09/16/24 1526)  Temp Source: Oral (09/16/24 1526)  Respirations: 17 (09/16/24 1526)  Height: 5' 2\" (157.5 cm) (09/16/24 1526)  Weight - Scale: 84.1 kg (185 lb 8 oz) (09/16/24 1526)  SpO2: 95 % (09/16/24 1526)    Physical Exam  HENT:      Nose: Nose normal.      Mouth/Throat:      Mouth: Mucous membranes are moist.      Comments: No dentures  Eyes:      Pupils: Pupils are equal, round, and reactive to light.   Cardiovascular:      Rate and Rhythm: Tachycardia present.   Pulmonary:      Effort: Pulmonary effort is " normal.      Breath sounds: Normal breath sounds.      Comments: Left lower lobe.  Crackles  Abdominal:      General: Bowel sounds are normal.      Palpations: Abdomen is soft.   Musculoskeletal:         General: Normal range of motion.      Cervical back: Normal range of motion.   Skin:     General: Skin is warm.   Neurological:      General: No focal deficit present.      Mental Status: She is alert and oriented to person, place, and time.   Psychiatric:         Behavior: Behavior normal.         Lines/Drains:  Lines/Drains/Airways       Active Status       None                        Additional Data:   Lab Results: I have reviewed the following results: CBC/BMP:   .     09/16/24  0757 09/16/24  1317   WBC 12.38*  --    HGB 13.3 11.2*   HCT 38.3 32.9*     --    BANDSPCT 26*  --    SODIUM 127*  --    K 3.4*  --    CL 88*  --    CO2 27  --    BUN 8  --    CREATININE 0.77  --    GLUC 114  --       Results from last 7 days   Lab Units 09/16/24  1317 09/16/24  0757   WBC Thousand/uL  --  12.38*   HEMOGLOBIN g/dL 11.2* 13.3   HEMATOCRIT % 32.9* 38.3   PLATELETS Thousands/uL  --  325   BANDS PCT %  --  26*   LYMPHO PCT %  --  5*   MONO PCT %  --  2*   EOS PCT %  --  0     Results from last 7 days   Lab Units 09/16/24  0757   SODIUM mmol/L 127*   POTASSIUM mmol/L 3.4*   CHLORIDE mmol/L 88*   CO2 mmol/L 27   BUN mg/dL 8   CREATININE mg/dL 0.77   ANION GAP mmol/L 12   CALCIUM mg/dL 9.4   ALBUMIN g/dL 4.1   TOTAL BILIRUBIN mg/dL 0.66   ALK PHOS U/L 72   ALT U/L 31   AST U/L 34   GLUCOSE RANDOM mg/dL 114     Results from last 7 days   Lab Units 09/16/24  0836   INR  1.06         Lab Results   Component Value Date    HGBA1C 5.0 05/13/2021    HGBA1C 4.5 12/04/2019    HGBA1C 4.9 11/14/2019           Imaging Review: Reviewed radiology reports from this admission including: CT abdomen/pelvis.  Other Studies: No additional pertinent studies reviewed.    Administrative Statements   I have spent a total time of 50 minutes in  caring for this patient on the day of the visit/encounter including Counseling / Coordination of care.    ** Please Note: This note has been constructed using a voice recognition system. **

## 2024-09-16 NOTE — ED ATTENDING ATTESTATION
9/16/2024  I, Sonal Nation MD, saw and evaluated the patient. I have discussed the patient with the resident/non-physician practitioner and agree with the resident's/non-physician practitioner's findings, Plan of Care, and MDM as documented in the resident's/non-physician practitioner's note, except where noted. All available labs and Radiology studies were reviewed.  I was present for key portions of any procedure(s) performed by the resident/non-physician practitioner and I was immediately available to provide assistance.       At this point I agree with the current assessment done in the Emergency Department.  I have conducted an independent evaluation of this patient a history and physical is as follows:  Patient here with a couple weeks of coffee-ground emesis in the setting of prior esophagitis.  Patient has been having ongoing symptoms and now is feeling globally unwell.  Also complaining of abdominal pain.  On exam patient is a little bit pale, but has pink conjunctiva.  Her mucous membranes are dry.  She is tachycardic.  She has adequate blood pressure.  On HEENT exam, she is anicteric.  Her neck is supple without subcutaneous air.  Her heart is regular tachycardic without murmurs, rubs, or gallops.  Her lungs are decreased on the left.  The patient's abdomen is soft with mild epigastric tenderness with no rebound, guarding, or masses.  Her extremities are intact.  She is neurologically nonfocal. MEDICAL DECISION MAKING    Number and Complexity of Problems  Differential diagnosis: Vomiting, aspiration, upper GI bleeding    Medical Decision Making Data  External documents reviewed: Patient's prior hospitalizations reviewed  My EKG interpretation: Sinus, narrow complex, no ischemia or ectopy  My CT interpretation: Evidence of pneumonia, no bowel obstruction  My X-ray interpretation: Left lower lobe pneumonia  My ultrasound interpretation:     CT abdomen pelvis with contrast   Final Result      1.   Consolidation within the visualized left lung compatible with pneumonia.   2.  Circumferential thickening of the distal esophagus suggestive of esophagitis. Patient noted to be scheduled for upper endoscopy.   3.  No acute intra-abdominal process.      The study was marked in EPIC for immediate notification.      Workstation performed: [id number]         XR chest 1 view portable   ED Interpretation   Correction pneumonia on the left.      Final Result      Hazy patchy consolidation in the left lung concerning for pneumonia. Follow-up to radiographic resolution advised.            Workstation performed: [id number]             Labs Reviewed   CBC AND DIFFERENTIAL - Abnormal       Result Value Ref Range Status    WBC 12.38 (*) 4.31 - 10.16 Thousand/uL Final    RBC 4.69  3.81 - 5.12 Million/uL Final    Hemoglobin 13.3  11.5 - 15.4 g/dL Final    Hematocrit 38.3  34.8 - 46.1 % Final    MCV 82  82 - 98 fL Final    MCH 28.4  26.8 - 34.3 pg Final    MCHC 34.7  31.4 - 37.4 g/dL Final    RDW 15.9 (*) 11.6 - 15.1 % Final    MPV 8.3 (*) 8.9 - 12.7 fL Final    Platelets 325  149 - 390 Thousands/uL Final    Narrative:     This is an appended report.  These results have been appended to a previously verified report.   COMPREHENSIVE METABOLIC PANEL - Abnormal    Sodium 127 (*) 135 - 147 mmol/L Final    Potassium 3.4 (*) 3.5 - 5.3 mmol/L Final    Chloride 88 (*) 96 - 108 mmol/L Final    CO2 27  21 - 32 mmol/L Final    ANION GAP 12  4 - 13 mmol/L Final    BUN 8  5 - 25 mg/dL Final    Creatinine 0.77  0.60 - 1.30 mg/dL Final    Comment: Standardized to IDMS reference method    Glucose 114  65 - 140 mg/dL Final    Comment: If the patient is fasting, the ADA then defines impaired fasting glucose as > 100 mg/dL and diabetes as > or equal to 123 mg/dL.    Calcium 9.4  8.4 - 10.2 mg/dL Final    AST 34  13 - 39 U/L Final    ALT 31  7 - 52 U/L Final    Comment: Specimen collection should occur prior to Sulfasalazine administration due to the  "potential for falsely depressed results.     Alkaline Phosphatase 72  34 - 104 U/L Final    Total Protein 6.8  6.4 - 8.4 g/dL Final    Albumin 4.1  3.5 - 5.0 g/dL Final    Total Bilirubin 0.66  0.20 - 1.00 mg/dL Final    Comment: Use of this assay is not recommended for patients undergoing treatment with eltrombopag due to the potential for falsely elevated results.  N-acetyl-p-benzoquinone imine (metabolite of Acetaminophen) will generate erroneously low results in samples for patients that have taken an overdose of Acetaminophen.    eGFR 81  ml/min/1.73sq m Final    Narrative:     National Kid[employer] guidelines for Chronic Kidney Disease (CKD):     Stage 1 with normal or high GFR (GFR > 90 mL/min/1.73 square meters)    Stage 2 Mild CKD (GFR = 60-89 mL/min/1.73 square meters)    Stage 3A Moderate CKD (GFR = 45-59 mL/min/1.73 square meters)    Stage 3B Moderate CKD (GFR = 30-44 mL/min/1.73 square meters)    Stage 4 Severe CKD (GFR = 15-29 mL/min/1.73 square meters)    Stage 5 End Stage CKD (GFR <15 mL/min/1.73 square meters)  Note: GFR calculation is accurate only with a steady state creatinine   MANUAL DIFFERENTIAL(PHLEBS DO NOT ORDER) - Abnormal    Segmented % 61  43 - 75 % Final    Bands % 26 (*) 0 - 8 % Final    Lymphocytes % 5 (*) 14 - 44 % Final    Monocytes % 2 (*) 4 - 12 % Final    Eosinophils % 0  0 - 6 % Final    Basophils % 0  0 - 1 % Final    Metamyelocytes % 6 (*) 0 - 1 % Final    Absolute Neutrophils 10.77 (*) 1.85 - 7.62 Thousand/uL Final    Absolute Lymphocytes 0.62  0.60 - 4.47 Thousand/uL Final    Absolute Monocytes 0.25  0.00 - 1.22 Thousand/uL Final    Absolute Eosinophils 0.00  0.00 - 0.40 Thousand/uL Final    Absolute Basophils 0.00  0.00 - 0.10 Thousand/uL Final    Absolute Metamyelocytes 0.74 (*) 0.00 - 0.10 Thousand/uL Final    Total Counted     Final    Platelet Estimate Adequate  Adequate Final   HS TROPONIN I 0HR - Normal    hs TnI 0hr 3  \"Refer to ACS Flowchart\"- see link " "ng/L Final    Comment:                                              Initial (time 0) result  If >=50 ng/L, Myocardial injury suggested ;  Type of myocardial injury and treatment strategy  to be determined.  If 5-49 ng/L, a delta result at 2 hours and or 4 hours will be needed to further evaluate.  If <4 ng/L, and chest pain has been >3 hours since onset, patient may qualify for discharge based on the HEART score in the ED.  If <5 ng/L and <3hours since onset of chest pain, a delta result at 2 hours will be needed to further evaluate.    HS Troponin 99th Percentile URL of a Health Population=12 ng/L with a 95% Confidence Interval of 8-18 ng/L.    Second Troponin (time 2 hours)  If calculated delta >= 20 ng/L,  Myocardial injury suggested ; Type of myocardial injury and treatment strategy to be determined.  If 5-49 ng/L and the calculated delta is 5-19 ng/L, consult medical service for evaluation.  Continue evaluation for ischemia on ecg and other possible etiology and repeat hs troponin at 4 hours.  If delta is <5 ng/L at 2 hours, consider discharge based on risk stratification via the HEART score (if in ED), or JERRY risk score in IP/Observation.    HS Troponin 99th Percentile URL of a Health Population=12 ng/L with a 95% Confidence Interval of 8-18 ng/L.   HS TROPONIN I 2HR - Normal    hs TnI 2hr 3  \"Refer to ACS Flowchart\"- see link ng/L Final    Comment:                                              Initial (time 0) result  If >=50 ng/L, Myocardial injury suggested ;  Type of myocardial injury and treatment strategy  to be determined.  If 5-49 ng/L, a delta result at 2 hours and or 4 hours will be needed to further evaluate.  If <4 ng/L, and chest pain has been >3 hours since onset, patient may qualify for discharge based on the HEART score in the ED.  If <5 ng/L and <3hours since onset of chest pain, a delta result at 2 hours will be needed to further evaluate.    HS Troponin 99th Percentile URL of a Health " Population=12 ng/L with a 95% Confidence Interval of 8-18 ng/L.    Second Troponin (time 2 hours)  If calculated delta >= 20 ng/L,  Myocardial injury suggested ; Type of myocardial injury and treatment strategy to be determined.  If 5-49 ng/L and the calculated delta is 5-19 ng/L, consult medical service for evaluation.  Continue evaluation for ischemia on ecg and other possible etiology and repeat hs troponin at 4 hours.  If delta is <5 ng/L at 2 hours, consider discharge based on risk stratification via the HEART score (if in ED), or JERRY risk score in IP/Observation.    HS Troponin 99th Percentile URL of a Health Population=12 ng/L with a 95% Confidence Interval of 8-18 ng/L.    Delta 2hr hsTnI 0  <20 ng/L Final   PROTIME-INR - Normal    Protime 14.1  12.3 - 15.0 seconds Final    INR 1.06  0.85 - 1.19 Final    Narrative:     INR Therapeutic Range    Indication                                             INR Range      Atrial Fibrillation                                               2.0-3.0  Hypercoagulable State                                    2.0.2.3  Left Ventricular Asist Device                            2.0-3.0  Mechanical Heart Valve                                  -    Aortic(with afib, MI, embolism, HF, LA enlargement,    and/or coagulopathy)                                     2.0-3.0 (2.5-3.5)     Mitral                                                             2.5-3.5  Prosthetic/Bioprosthetic Heart Valve               2.0-3.0  Venous thromboembolism (VTE: VT, PE        2.0-3.0   HEMOGLOBIN AND HEMATOCRIT, BLOOD   TYPE AND SCREEN    ABO Grouping O   Final    Rh Factor Positive   Final    Antibody Screen Negative   Final    Specimen Expiration Date 20240919   Final       Labs reviewed by me are significant for: Adequate hemoglobin, elevated bands    Clinical decision rules/scores are significant for:     Discussed case with: Medicine and GI  Considered admission for: Pneumonia, sepsis, upper GI  bleeding    Treatment and Disposition  ED course: Patient seen and examined.  IV access established.  Type and screen, labs.  IV fluids started.  Patient with x-ray concerning for pneumonia.  Patient already receiving IV proton pump inhibitor.  Patient given dose of antibiotics.  Discussed with GI.  Patient not having acute blood loss anemia, is hemodynamically stable, but is sepsis with elevated bandemia and pneumonia, may be aspiration given ongoing vomiting.  Will plan to admit for sepsis, GI bleeding  Shared decision making:   Code status:     ED Course         Critical Care Time  CriticalCare Time    Date/Time: 9/16/2024 4:39 PM    Performed by: Sonal Nation MD  Authorized by: Sonal Nation MD    Critical care provider statement:     Critical care time (minutes):  45    Critical care time was exclusive of:  Separately billable procedures and treating other patients and teaching time    Critical care was necessary to treat or prevent imminent or life-threatening deterioration of the following conditions:  Sepsis    Critical care was time spent personally by me on the following activities:  Blood draw for specimens, obtaining history from patient or surrogate, development of treatment plan with patient or surrogate, discussions with consultants, discussions with primary provider, evaluation of patient's response to treatment, examination of patient, review of old charts, re-evaluation of patient's condition, ordering and review of radiographic studies, ordering and review of laboratory studies and ordering and performing treatments and interventions

## 2024-09-17 LAB
ANION GAP SERPL CALCULATED.3IONS-SCNC: 7 MMOL/L (ref 4–13)
ATRIAL RATE: 86 BPM
ATRIAL RATE: 99 BPM
BUN SERPL-MCNC: 3 MG/DL (ref 5–25)
CALCIUM SERPL-MCNC: 8.9 MG/DL (ref 8.4–10.2)
CHLORIDE SERPL-SCNC: 94 MMOL/L (ref 96–108)
CO2 SERPL-SCNC: 32 MMOL/L (ref 21–32)
CREAT SERPL-MCNC: 0.43 MG/DL (ref 0.6–1.3)
ERYTHROCYTE [DISTWIDTH] IN BLOOD BY AUTOMATED COUNT: 15.9 % (ref 11.6–15.1)
GFR SERPL CREATININE-BSD FRML MDRD: 107 ML/MIN/1.73SQ M
GLUCOSE SERPL-MCNC: 105 MG/DL (ref 65–140)
HCT VFR BLD AUTO: 31.3 % (ref 34.8–46.1)
HCT VFR BLD AUTO: 32.9 % (ref 34.8–46.1)
HCT VFR BLD AUTO: 33.2 % (ref 34.8–46.1)
HGB BLD-MCNC: 10.4 G/DL (ref 11.5–15.4)
HGB BLD-MCNC: 11 G/DL (ref 11.5–15.4)
HGB BLD-MCNC: 11.1 G/DL (ref 11.5–15.4)
MCH RBC QN AUTO: 28.2 PG (ref 26.8–34.3)
MCHC RBC AUTO-ENTMCNC: 33.7 G/DL (ref 31.4–37.4)
MCV RBC AUTO: 84 FL (ref 82–98)
P AXIS: 55 DEGREES
P AXIS: 70 DEGREES
PLATELET # BLD AUTO: 252 THOUSANDS/UL (ref 149–390)
PMV BLD AUTO: 8.3 FL (ref 8.9–12.7)
POTASSIUM SERPL-SCNC: 3.1 MMOL/L (ref 3.5–5.3)
PR INTERVAL: 128 MS
PR INTERVAL: 133 MS
QRS AXIS: 2 DEGREES
QRS AXIS: 36 DEGREES
QRSD INTERVAL: 72 MS
QRSD INTERVAL: 88 MS
QT INTERVAL: 312 MS
QT INTERVAL: 363 MS
QTC INTERVAL: 400 MS
QTC INTERVAL: 435 MS
RBC # BLD AUTO: 3.93 MILLION/UL (ref 3.81–5.12)
SODIUM SERPL-SCNC: 133 MMOL/L (ref 135–147)
T WAVE AXIS: 50 DEGREES
T WAVE AXIS: 78 DEGREES
VENTRICULAR RATE: 86 BPM
VENTRICULAR RATE: 99 BPM
WBC # BLD AUTO: 9.98 THOUSAND/UL (ref 4.31–10.16)

## 2024-09-17 PROCEDURE — 85018 HEMOGLOBIN: CPT | Performed by: INTERNAL MEDICINE

## 2024-09-17 PROCEDURE — 80048 BASIC METABOLIC PNL TOTAL CA: CPT | Performed by: STUDENT IN AN ORGANIZED HEALTH CARE EDUCATION/TRAINING PROGRAM

## 2024-09-17 PROCEDURE — 85027 COMPLETE CBC AUTOMATED: CPT

## 2024-09-17 PROCEDURE — 99233 SBSQ HOSP IP/OBS HIGH 50: CPT | Performed by: INTERNAL MEDICINE

## 2024-09-17 PROCEDURE — 85014 HEMATOCRIT: CPT | Performed by: INTERNAL MEDICINE

## 2024-09-17 PROCEDURE — 99232 SBSQ HOSP IP/OBS MODERATE 35: CPT | Performed by: INTERNAL MEDICINE

## 2024-09-17 PROCEDURE — 93010 ELECTROCARDIOGRAM REPORT: CPT | Performed by: INTERNAL MEDICINE

## 2024-09-17 RX ORDER — METOCLOPRAMIDE 5 MG/1
5 TABLET ORAL
Status: DISCONTINUED | OUTPATIENT
Start: 2024-09-17 | End: 2024-09-20 | Stop reason: HOSPADM

## 2024-09-17 RX ORDER — POTASSIUM CHLORIDE 1500 MG/1
40 TABLET, EXTENDED RELEASE ORAL ONCE
Status: COMPLETED | OUTPATIENT
Start: 2024-09-17 | End: 2024-09-17

## 2024-09-17 RX ORDER — QUETIAPINE 200 MG/1
400 TABLET, FILM COATED, EXTENDED RELEASE ORAL
Status: DISCONTINUED | OUTPATIENT
Start: 2024-09-17 | End: 2024-09-20 | Stop reason: HOSPADM

## 2024-09-17 RX ORDER — PANTOPRAZOLE SODIUM 40 MG/1
40 TABLET, DELAYED RELEASE ORAL 2 TIMES DAILY
Status: DISCONTINUED | OUTPATIENT
Start: 2024-09-17 | End: 2024-09-20 | Stop reason: HOSPADM

## 2024-09-17 RX ADMIN — SODIUM CHLORIDE, SODIUM GLUCONATE, SODIUM ACETATE, POTASSIUM CHLORIDE, MAGNESIUM CHLORIDE, SODIUM PHOSPHATE, DIBASIC, AND POTASSIUM PHOSPHATE 75 ML/HR: .53; .5; .37; .037; .03; .012; .00082 INJECTION, SOLUTION INTRAVENOUS at 12:33

## 2024-09-17 RX ADMIN — POTASSIUM CHLORIDE 40 MEQ: 1500 TABLET, EXTENDED RELEASE ORAL at 14:07

## 2024-09-17 RX ADMIN — SUCRALFATE 1 G: 1 TABLET ORAL at 08:10

## 2024-09-17 RX ADMIN — LORAZEPAM 2 MG: 1 TABLET ORAL at 21:44

## 2024-09-17 RX ADMIN — METOCLOPRAMIDE HYDROCHLORIDE 5 MG: 5 INJECTION INTRAMUSCULAR; INTRAVENOUS at 11:14

## 2024-09-17 RX ADMIN — LEVOTHYROXINE SODIUM 25 MCG: 25 TABLET ORAL at 06:27

## 2024-09-17 RX ADMIN — METOCLOPRAMIDE HYDROCHLORIDE 5 MG: 5 INJECTION INTRAMUSCULAR; INTRAVENOUS at 06:28

## 2024-09-17 RX ADMIN — PAROXETINE HYDROCHLORIDE 60 MG: 20 TABLET, FILM COATED ORAL at 08:10

## 2024-09-17 RX ADMIN — SODIUM CHLORIDE, SODIUM GLUCONATE, SODIUM ACETATE, POTASSIUM CHLORIDE, MAGNESIUM CHLORIDE, SODIUM PHOSPHATE, DIBASIC, AND POTASSIUM PHOSPHATE 75 ML/HR: .53; .5; .37; .037; .03; .012; .00082 INJECTION, SOLUTION INTRAVENOUS at 02:13

## 2024-09-17 RX ADMIN — QUETIAPINE FUMARATE 400 MG: 200 TABLET, EXTENDED RELEASE ORAL at 23:46

## 2024-09-17 RX ADMIN — SUCRALFATE 1 G: 1 TABLET ORAL at 11:14

## 2024-09-17 RX ADMIN — CEFTRIAXONE SODIUM 2000 MG: 10 INJECTION, POWDER, FOR SOLUTION INTRAVENOUS at 08:11

## 2024-09-17 RX ADMIN — METOCLOPRAMIDE 5 MG: 5 TABLET ORAL at 17:55

## 2024-09-17 RX ADMIN — PANTOPRAZOLE SODIUM 40 MG: 40 INJECTION, POWDER, FOR SOLUTION INTRAVENOUS at 08:10

## 2024-09-17 RX ADMIN — PANTOPRAZOLE SODIUM 40 MG: 40 TABLET, DELAYED RELEASE ORAL at 21:43

## 2024-09-17 RX ADMIN — SUCRALFATE 1 G: 1 TABLET ORAL at 15:34

## 2024-09-17 RX ADMIN — LORAZEPAM 2 MG: 1 TABLET ORAL at 15:34

## 2024-09-17 NOTE — CASE MANAGEMENT
Case Management Assessment & Discharge Planning Note    Patient name Irene Plascencia  Location [facility]- MRN [id number]  : 1960 Date 2024       Current Admission Date: 2024  Current Admission Diagnosis:Aspiration pneumonia (HCC)   Patient Active Problem List    Diagnosis Date Noted Date Diagnosed    Aspiration pneumonia (HCC) 2024     Heartburn 2024     Gastroparesis 2024     Acute cough 2024     Elevated vitamin B12 level 2024     Iron deficiency 2024     Acquired absence of other toe(s), unspecified side ([facility]) 2024     Osteomyelitis, unspecified site, unspecified type ([facility]) 2024     Vitamin B12 deficiency (dietary) anemia 2023     Folate deficiency 2023     Neutropenia ([facility]) 2023     Post concussion syndrome 2023     Sepsis without acute organ dysfunction ([facility]) 2023     Acute respiratory failure with hypoxia ([facility]) 2023     Headache 2023     Nutritional anemia 2023     Stress incontinence 2023     Ulcer of toe, chronic, left, with unspecified severity ([facility]) 2023     Acute encephalopathy 2022     Thrombocytosis 2022     Abdominal pain 2022     Schreiber's esophagus 2022     Microcytic anemia 2022     Self neglect 2022     Pruritus 2022     Upper GI bleed 01/10/2022     Nausea and vomiting 10/22/2021     S/P tooth extraction 10/22/2021     SIRS (systemic inflammatory response syndrome) ([facility]) 2021     Hyperlipidemia 2021     Word finding difficulty 2021     Hiatal hernia with GERD without esophagitis 2021     Polyp of colon 2021     Melena 2020     Cellulitis of left upper extremity 2020     Erosive esophagitis 2020     Rash 2020     Iron deficiency anemia 2020     Multiple excoriations 2020     Fall 2020     Esophagitis 2020     Hyponatremia 2020      Problem related to living arrangement 01/27/2020     Hypokalemia      NMS (neuroleptic malignant syndrome) 01/03/2020     Preop exam for internal medicine 11/18/2019     Severe iron deficiency anemia  11/14/2019     Chronic bilateral low back pain without sciatica 11/01/2019     Right hip pain 07/15/2019     Primary hypertension 06/12/2019     Dermal hypersensitivity reaction 11/08/2018     Psychiatric disorder 08/21/2018     Schizoaffective disorder (HCC) 08/16/2018     Serotonin syndrome 08/13/2018     Other fatigue 06/19/2018     Spinal stenosis of lumbar region with neurogenic claudication 06/15/2018     Lumbar spondylosis 06/15/2018     T12 compression fracture (HCC) 06/15/2018     Synovial cyst of lumbar facet joint 06/15/2018     Localized osteoporosis with current pathological fracture with routine healing 05/25/2018     Lumbar radiculopathy 03/19/2018     DDD (degenerative disc disease), lumbar 03/19/2018     Spondylolisthesis at L4-L5 level 03/19/2018     Anxiety 10/31/2016     Acquired hypothyroidism 10/31/2016     Constipation 04/10/2014     Bulimia nervosa in remission 03/19/2014       LOS (days): 1  Geometric Mean LOS (GMLOS) (days): 4.6  Days to GMLOS:3.5     OBJECTIVE:    Risk of Unplanned Readmission Score: 20.29         Current admission status: Inpatient       Preferred Pharmacy:   [facility]/pharmacy #[id number] - [place], [place] - [address]  [address] [address]  Phone: [phone] Fax: [phone]    Primary Care Provider: Raheem Cain MD    Primary Insurance: [facility]  Secondary Insurance: [facility]    ASSESSMENT:  Active Health Care Proxies    There are no active Health Care Proxies on file.       Advance Directives  Does patient have a Health Care POA?: No  Was patient offered paperwork?: Yes (Declined has an  working on the paperwork)  Does patient have Advance Directives?: No  Was patient offered paperwork?: Yes (Declined has an   working on the paperwork)         Readmission Root Cause  30 Day Readmission: No    Patient Information  Admitted from:: Home  Mental Status: Alert  During Assessment patient was accompanied by: Not accompanied during assessment  Assessment information provided by:: Patient  Primary Caregiver: Self  Support Systems: Self, Spouse/significant other, Friends/neighbors  County of Residence: [place]  What city do you live in?: [place], [place]  Home entry access options. Select all that apply.: Stairs  Number of steps to enter home.: 2  Do the steps have railings?: Yes  Type of Current Residence: [facility]  Living Arrangements: Lives Alone  Is patient a ?: No    Activities of Daily Living Prior to Admission  Functional Status: Independent  Completes ADLs independently?: Yes  Ambulates independently?: Yes  Does patient use assisted devices?: No  Does patient currently own DME?: Yes  What DME does the patient currently own?: Straight Cane, Walker  Does patient have a history of Outpatient Therapy (PT/OT)?: No  Does the patient have a history of Short-Term Rehab?: Yes ([facility] in [place])  Does patient have a history of HHC?: Yes ()  Does patient currently have HHC?: No         Patient Information Continued  Income Source: Pension/[employer]  Does patient have prescription coverage?: Yes  Does patient receive dialysis treatments?: No  Does patient have a history of substance abuse?: No  Does patient have a history of Mental Health Diagnosis?: Yes (Chronic Anxiety)  Is patient receiving treatment for mental health?: Yes (medication and therapist)  Has patient received inpatient treatment related to mental health in the last 2 years?: No         Means of Transportation  Means of Transport to [facility]:: Friends (neighbor and [employer] transport)      Social Determinants of Health (SDOH)      Flowsheet Row Most Recent Value   Housing Stability    In the last 12 months, was there a time when you were not  able to pay the mortgage or rent on time? N   In the past 12 months, how many times have you moved where you were living? 0   At any time in the past 12 months, were you homeless or living in a shelter (including now)? N   Transportation Needs    In the past 12 months, has lack of transportation kept you from medical appointments or from getting medications? no   In the past 12 months, has lack of transportation kept you from meetings, work, or from getting things needed for daily living? No   Food Insecurity    Within the past 12 months, you worried that your food would run out before you got the money to buy more. Never true   Within the past 12 months, the food you bought just didn't last and you didn't have money to get more. Never true   Utilities    In the past 12 months has the electric, gas, oil, or water company threatened to shut off services in your home? Yes  [has had issues recently mainly with heating (oil) the home.]            DISCHARGE DETAILS:    Discharge planning discussed with:: patient  Freedom of Choice: Yes  Comments - Freedom of Choice: pending pt/ot crystal  CM contacted family/caregiver?: No- see comments (alert and oriented)  Were Treatment Team discharge recommendations reviewed with patient/caregiver?: Yes  Did patient/caregiver verbalize understanding of patient care needs?: Yes  Were patient/caregiver advised of the risks associated with not following Treatment Team discharge recommendations?: Yes     CM introduced role and self to patient.  Patient had requested someone from CM visit her to assist with a court date scheduled for 9/18.  CM and patient did notify  [place] office where patient spoke with his  to postpone the appointment, who will follow up with the cancellation request directly with patient's phone number to her room. Patient lives alone in a ranch home with 2 steps to enter.  Independent with ambulation and adl's.  Patient no longer driving, states  her neighbor, Jorge Chery assists with transport needs to the grocery store (didn't want to add as a contact) and [employer] assist with Uber doctor appointments.  Patient states the home/car are in her mother's name who has recently passed away.  Patient also states that she will be going to [facility] at some point in the near future as she already paid one month rent and signed a lease with them.  Patient owns a cane/walker, has a hx of STR, HHC and MH.  No hx of SA or OP therapy.  ..CM reviewed d/c planning process including the following: identifying help at home, patient preference for d/c planning needs, Discharge Lounge, [facility] Meds to Bed program, availability of treatment team to discuss questions or concerns patient and/or family may have regarding understanding medications and recognizing signs and symptoms once discharged.  CM also encouraged patient to follow up with all recommended appointments after discharge. Patient advised of importance for patient and family to participate in managing patient’s medical well being.

## 2024-09-17 NOTE — UTILIZATION REVIEW
Initial Clinical Review    Admission: Date/Time/Statement:   Admission Orders (From admission, onward)       Ordered        09/16/24 1101  Inpatient Admission  Once                          Orders Placed This Encounter   Procedures    Inpatient Admission     Standing Status:   Standing     Number of Occurrences:   1     Order Specific Question:   Level of Care     Answer:   Level 2 Stepdown / HOT [14]     Order Specific Question:   Estimated length of stay     Answer:   More than 2 Midnights     Order Specific Question:   Certification     Answer:   I certify that inpatient services are medically necessary for this patient for a duration of greater than two midnights. See H&P and MD Progress Notes for additional information about the patient's course of treatment.     ED Arrival Information       Expected   -    Arrival   9/16/2024 07:21    Acuity   Emergent              Means of arrival   Ambulance    Escorted by   [facility]    Service   Hospitalist    Admission type   Emergency              Arrival complaint   vomitting blood             Chief Complaint   Patient presents with    Vomiting     Pt has been vomiting for 1 week. Reported that vomitus was black in color. Ems reports that pt was pale and sweaty on their arrival. Pt has gi hx and scheduled for an EGD in 2 weeks. Pt denies any dark stool       Initial Presentation: 64 y.o. female   to ER  from home via EMS:     PMH of gastroparesis, hitial hernia s/p TIF, GERD with esophagitis, Schreiber's esophagus,  hyponatremia on salt tablets.   Last EGD performed in December 2022 showed LA grade B esophagitis. In April 2022, she has LA grade D esophagitis with liner esophageal ulcers and C1M3 Schreiber's esophagus not biopsied due to severe esophagitis.  At her last office visit in June 2024, she was continued on Protonix 40 mg twice a day, Pepcid 40 mg daily as well as Carafate TID with plan for repeat EGD for further evaluation of esophagitis on 9/26.  The  plan at that time was that if her EGD was unrevealing, then they would consider referral to [facility] for further evaluation for a gastric pacemaker versus GPOEM.          9/16  to ER w/c/o  coffee ground emesis and abdominal pain,  ongoing for the past 2 weeks.  She denies any worsening of her baseline GERD symptoms.  Afebrile and Hemodynamically stable but with hypoxia placed on 2L NC. Lab significant for hyponatremia 127 and hypokalemia 3.4. Hgb 13.3 (baseline 10-13), WBC 12.3 with bandemia.   CTAP  shows  no acute intra-abdominal process or evidence of bleeding but did show left lung consolidation suggestive of pneumonia as well as distal esophageal circumferential thickening  (? Aspiration)       9/16  ADMIT  IP   Status,  MS  Level of care     for management of   Upper GIB and PNA  (suspected aspiration)    in setting of hyponatremia.  Start IV PPI, monitor H&H every 8.  Consult GI.  Initiate Reglan  IV  TID -  if QTc is normal.  Will obtain EKG.  Hold Geodon while on Reglan to avoid QTc prolongation.  Allow clear liquids.     Anticipated Length of Stay/Certification Statement: Patient will be admitted on an inpatient basis with an anticipated length of stay of greater than 2 midnights secondary to gi bleed.       Date: 9/17   Day 2:  pt alert - normal breath sounds.   No stools or emesis since admit.  Tolerating clear liquid diet.   GI consulted-need for repeat EGD/colonoscopy - recommended Reglan 5 mg IV 3 times daily before meals if QTc is normal.     ---------------------------------------------------------------------------------------------------------------------------------------------------------    Weight (last 2 days)       Date/Time Weight    09/16/24 1526 84.1 (185.5)    09/16/24 0744 86.9 (191.58)            Vital Signs (last 3 days)       Date/Time Temp Pulse Resp BP MAP (mmHg) SpO2 Nasal Cannula O2 Flow Rate (L/min) O2 Device Marcella Coma Scale Score Pain    09/17/24 1055 98.1 °F (36.7 °C) --  18 120/72 -- -- -- -- -- --    09/17/24 0800 -- -- -- -- -- -- -- -- 15 No Pain    09/17/24 0735 98.7 °F (37.1 °C) 87 18 128/73 91 98 % 2 L/min Nasal cannula -- --    09/17/24 0300 98.4 °F (36.9 °C) 76 -- 119/73 83 97 % -- -- -- --    09/17/24 0000 -- -- -- -- -- -- -- -- 15 --    09/16/24 2300 -- -- -- -- -- -- -- -- -- No Pain    09/16/24 2230 98.9 °F (37.2 °C) 80 -- 116/70 84 95 % -- -- -- --    09/16/24 2000 -- -- -- -- -- -- -- -- 15 --    09/16/24 1600 -- -- -- -- -- -- -- -- 15 No Pain    09/16/24 1526 99.5 °F (37.5 °C) 100 17 133/75 104 95 % 2 L/min Nasal cannula -- No Pain    09/16/24 1235 98 °F (36.7 °C) 88 16 125/75 86 96 % 2 L/min Nasal cannula 15 No Pain    09/16/24 1200 -- 92 14 120/73 92 94 % 2 L/min Nasal cannula -- --    09/16/24 1100 -- 92 14 130/74 96 94 % -- -- -- --    09/16/24 1030 -- 96 15 147/96 113 95 % 2 L/min Nasal cannula -- --    09/16/24 1000 -- 96 16 135/75 99 93 % 2 L/min Nasal cannula -- --    09/16/24 0800 -- 102 18 157/85 114 95 % 2 L/min Nasal cannula -- --    09/16/24 0748 -- -- -- -- -- -- -- -- 15 --    09/16/24 0744 98 °F (36.7 °C) 103 20 126/58 -- 94 % -- None (Room air) -- No Pain              Pertinent Labs/Diagnostic Test Results:   Radiology:  CT abdomen pelvis with contrast   Final Interpretation by Saran Reyes MD (09/16 0918)      1.  Consolidation within the visualized left lung compatible with pneumonia.   2.  Circumferential thickening of the distal esophagus suggestive of esophagitis. Patient noted to be scheduled for upper endoscopy.   3.  No acute intra-abdominal process.      The study was marked in EPIC for immediate notification.      Workstation performed: [id number]         XR chest 1 view portable   ED Interpretation by Vivek Montelongo MD (09/16 0909)   Correction pneumonia on the left.      Final Interpretation by Walt Ma MD (09/16 9711)      Hazy patchy consolidation in the left lung concerning for pneumonia. Follow-up to radiographic resolution  advised.            Workstation performed: [id number]           Cardiology:  ECG 12 lead    by Interface, Ris Results In (09/16 1519)   Normal sinus rhythm  Possible Lateral infarct (cited on or before 16-SEP-2024)  Abnormal ECG     GI:  No orders to display       Results from last 7 days   Lab Units 09/16/24  1317   SARS-COV-2  Negative     Results from last 7 days   Lab Units 09/17/24  0825 09/17/24  0028 09/16/24  1655 09/16/24  1317 09/16/24  0757   WBC Thousand/uL 9.98  --   --   --  12.38*   HEMOGLOBIN g/dL 11.1* 10.4* 11.3* 11.2* 13.3   HEMATOCRIT % 32.9* 31.3* 33.8* 32.9* 38.3   PLATELETS Thousands/uL 252  --   --   --  325   BANDS PCT %  --   --   --   --  26*         Results from last 7 days   Lab Units 09/17/24  0541 09/16/24  1655 09/16/24  0757   SODIUM mmol/L 133* 130* 127*   POTASSIUM mmol/L 3.1* 3.0* 3.4*   CHLORIDE mmol/L 94* 94* 88*   CO2 mmol/L 32 30 27   ANION GAP mmol/L 7 6 12   BUN mg/dL 3* 6 8   CREATININE mg/dL 0.43* 0.59* 0.77   EGFR ml/min/1.73sq m 107 97 81   CALCIUM mg/dL 8.9 8.9 9.4     Results from last 7 days   Lab Units 09/16/24  0757   AST U/L 34   ALT U/L 31   ALK PHOS U/L 72   TOTAL PROTEIN g/dL 6.8   ALBUMIN g/dL 4.1   TOTAL BILIRUBIN mg/dL 0.66         Results from last 7 days   Lab Units 09/17/24  0541 09/16/24  1655 09/16/24  0757   GLUCOSE RANDOM mg/dL 105 97 114       Results from last 7 days   Lab Units 09/16/24  1018 09/16/24  0757   HS TNI 0HR ng/L  --  3   HS TNI 2HR ng/L 3  --    HSTNI D2 ng/L 0  --          Results from last 7 days   Lab Units 09/16/24  0836   PROTIME seconds 14.1   INR  1.06       Results from last 7 days   Lab Units 09/16/24  1529 09/16/24  1317   STREP PNEUMONIAE ANTIGEN, URINE  Negative  --    LEGIONELLA URINARY ANTIGEN  Negative  --    INFLUENZA A PCR   --  Negative   INFLUENZA B PCR   --  Negative   RSV PCR   --  Negative       ED Treatment-Medication Administration from 09/16/2024 0721 to 09/16/2024 1231         Date/Time Order Dose Route  Action     09/16/2024 0840 sodium chloride 0.9 % bolus 1,000 mL 1,000 mL Intravenous New Bag     09/16/2024 0840 pantoprazole (PROTONIX) injection 40 mg 40 mg Intravenous Given     09/16/2024 1013 ceftriaxone (ROCEPHIN) 2 g/50 mL in dextrose IVPB 2,000 mg Intravenous New Bag     09/16/2024 1020 ondansetron (ZOFRAN) injection 4 mg 4 mg Intravenous Given     09/16/2024 1138 multi-electrolyte (PLASMALYTE-A/ISOLYTE-S PH 7.4) IV solution 75 mL/hr Intravenous New Bag            Past Medical History:   Diagnosis Date    Anemia     Anxiety     Arthritis     Back pain at L4-L5 level     Schreiber esophagus     Bulimia     Colon polyp     Disease of thyroid gland     hypothyroid    GERD (gastroesophageal reflux disease)     Hearing loss in left ear     History of transfusion     Hyperlipidemia     Hypertension     Hypothyroidism     Leukopenia     NMS (neuroleptic malignant syndrome) 01/03/2020    Pneumonia     RA (rheumatoid arthritis) ([facility])     in feet    Sciatica     Seizure ([facility])     due to medication mix up 8/2018 only one historically    Skin spots, red     lower right arm - poss from cat- no s/s infection    Synovial cyst of lumbar spine     Vertigo     Wears dentures     full set    Weight gain      Present on Admission:   Acquired hypothyroidism   Schizoaffective disorder (HCC)   Esophagitis   Upper GI bleed   Hyponatremia      Admitting Diagnosis: Hematemesis [K92.0]  Vomiting blood [K92.0]  Esophagitis [K20.90]  Age/Sex: 64 y.o. female  Admission Orders:   see above note.  Scd b/l LE.  Clear liquid diet .   I/O q shift.  Routine VS.  OOB as tolerated.   IVF Isolyte @  75 cc/hr.     Scheduled Medications:  cefTRIAXone, 2,000 mg, Intravenous, Q24H  levothyroxine, 25 mcg, Oral, Early Morning  metoclopramide, 5 mg, Intravenous, TID AC  pantoprazole, 40 mg, Intravenous, Q12H YUMIKO  PARoxetine, 60 mg, Oral, Daily  sodium chloride, 2 g, Oral, BID  sucralfate, 1 g, Oral, TID With Meals  [Held by provider] ziprasidone, 80 mg,  Oral, Daily      Continuous IV Infusions:  multi-electrolyte, 75 mL/hr, Intravenous, Continuous      PRN Meds:  acetaminophen, 650 mg, Oral, Q6H PRN  LORazepam, 2 mg, Oral, Q4H PRN  ondansetron, 4 mg, Intravenous, Q6H PRN  ...  9/17  @  1535        IP CONSULT TO GASTROENTEROLOGY    Network Utilization Review Department  ATTENTION: Please call with any questions or concerns to [phone] and carefully listen to the prompts so that you are directed to the right person. All voicemails are confidential.   For Discharge needs, contact Care Management DC Support Team at [phone] opt. [phone]  Send all requests for admission clinical reviews, approved or denied determinations and any other requests to dedicated fax number below belonging to the campus where the patient is receiving treatment. List of dedicated fax numbers for the Facilities:  FACILITY NAME UR FAX NUMBER   ADMISSION DENIALS (Administrative/Medical Necessity) [phone]   DISCHARGE SUPPORT TEAM ([facility]) [phone]   PARENT CHILD HEALTH (Maternity/NICU/Pediatrics) [phone]   [facility] [phone]   [facility] [phone]   [facility] [phone]   [facility] [phone]   [facility] [phone]   [facility] [phone]   [facility] [phone]   [facility] [phone]   [facility] [phone]   Pending sale to [facility] [phone]   [facility] [phone]   [facility] [phone]

## 2024-09-17 NOTE — PROGRESS NOTES
Progress Note - Hospitalist   Name: Irene Plascencia 64 y.o. female I MRN: [id number]  Unit/Bed#: [facility] 205-01 I Date of Admission: 2024   Date of Service: 2024 I Hospital Day: 1    Assessment & Plan  Upper GI bleed  Patient with history of esophagitis  Taking NSAIDs although taking PPI and sucralfate  Presented with coffee-ground emesis for 1 week  Start IV PPI, monitor H&H every 8  GI consulted-need for repeat EGD/colonoscopy  GI recommended Reglan 5 mg IV 3 times daily before meals if QTc is normal.  Will obtain EKG.  Hold Geodon while on Reglan to avoid QTc prolongation  Aspiration pneumonia (HCC)  Likely aspiration pneumonia from vomiting  Check COVID/RSV/Legionella/strep  Empiric antibiotic  Wean off oxygen as tolerated  Incentive spirometry  Aspiration precaution  Acquired hypothyroidism  Continue Synthroid  Schizoaffective disorder (HCC)  .  PTA home meds  Esophagitis  On Protonix sucralfate  GI on board-May need repeat EGD  Hyponatremia  History of hyponatremia on salt tablets  Monitor sodium closely  No acute altered mental status      Mobility:   Basic Mobility Inpatient Raw Score: 21  -HLM Goal: 6: Walk 10 steps or more  JH-HLM Achieved: 4: Move to chair/commode  JH-HLM Goal achieved. Continue to encourage appropriate mobility.          Current Length of Stay: 1 day(s)  Current Patient Status: Inpatient       Code Status: Level 1 - Full Code    Subjective   Patietn comfortable. O2 requirments improved    Objective     Vitals:   Temp (24hrs), Av.7 °F (37.1 °C), Min:98 °F (36.7 °C), Max:99.5 °F (37.5 °C)    Temp:  [98 °F (36.7 °C)-99.5 °F (37.5 °C)] 98.7 °F (37.1 °C)  HR:  [] 87  Resp:  [14-18] 18  BP: (116-133)/(70-75) 128/73  SpO2:  [94 %-98 %] 98 %  Body mass index is 33.93 kg/m².     Input and Output Summary (last 24 hours):     Intake/Output Summary (Last 24 hours) at 2024 1037  Last data filed at 2024 1001  Gross per 24 hour   Intake 3468.75 ml   Output 1500 ml    Net 1968.75 ml       Physical Exam  Constitutional:       Appearance: Normal appearance.   HENT:      Head: Normocephalic and atraumatic.   Cardiovascular:      Rate and Rhythm: Normal rate and regular rhythm.   Pulmonary:      Effort: Pulmonary effort is normal.      Breath sounds: Normal breath sounds.   Musculoskeletal:         General: No swelling.   Skin:     General: Skin is warm and dry.   Neurological:      Mental Status: She is alert.          Lines/Drains:  Lines/Drains/Airways       Active Status       None                              Results from last 7 days   Lab Units 09/17/24  0825 09/16/24  1317 09/16/24  0757   WBC Thousand/uL 9.98  --  12.38*   HEMOGLOBIN g/dL 11.1*   < > 13.3   HEMATOCRIT % 32.9*   < > 38.3   PLATELETS Thousands/uL 252  --  325   BANDS PCT %  --   --  26*   LYMPHO PCT %  --   --  5*   MONO PCT %  --   --  2*   EOS PCT %  --   --  0    < > = values in this interval not displayed.     Results from last 7 days   Lab Units 09/17/24  0541 09/16/24  1655 09/16/24  0757   SODIUM mmol/L 133*   < > 127*   POTASSIUM mmol/L 3.1*   < > 3.4*   CHLORIDE mmol/L 94*   < > 88*   CO2 mmol/L 32   < > 27   BUN mg/dL 3*   < > 8   CREATININE mg/dL 0.43*   < > 0.77   ANION GAP mmol/L 7   < > 12   CALCIUM mg/dL 8.9   < > 9.4   ALBUMIN g/dL  --   --  4.1   TOTAL BILIRUBIN mg/dL  --   --  0.66   ALK PHOS U/L  --   --  72   ALT U/L  --   --  31   AST U/L  --   --  34   GLUCOSE RANDOM mg/dL 105   < > 114    < > = values in this interval not displayed.     Results from last 7 days   Lab Units 09/16/24  0836   INR  1.06                   Recent Cultures (last 7 days):   Results from last 7 days   Lab Units 09/16/24  1529   LEGIONELLA URINARY ANTIGEN  Negative       Last 24 Hours Medication List:     Current Facility-Administered Medications:     acetaminophen (TYLENOL) tablet 650 mg, Q6H PRN    cefTRIAXone (ROCEPHIN) 2,000 mg in dextrose 5 % 50 mL IVPB, Q24H, Last Rate: 2,000 mg (09/17/24 0811)     levothyroxine tablet 25 mcg, Early Morning    LORazepam (ATIVAN) tablet 2 mg, Q4H PRN    metoclopramide (REGLAN) injection 5 mg, TID AC    multi-electrolyte (PLASMALYTE-A/ISOLYTE-S PH 7.4) IV solution, Continuous, Last Rate: 75 mL/hr (09/17/24 0213)    ondansetron (ZOFRAN) injection 4 mg, Q6H PRN    pantoprazole (PROTONIX) injection 40 mg, Q12H YUMIKO    PARoxetine (PAXIL) tablet 60 mg, Daily    sodium chloride tablet 2 g, BID    sucralfate (CARAFATE) tablet 1 g, TID With Meals    [Held by provider] ziprasidone (GEODON) capsule 80 mg, Daily    Administrative Statements   Today, Patient Was Seen By: Kapil Daniels DO      **Please Note: This note may have been constructed using a voice recognition system.**

## 2024-09-18 LAB
ANION GAP SERPL CALCULATED.3IONS-SCNC: 6 MMOL/L (ref 4–13)
BASOPHILS # BLD AUTO: 0.02 THOUSANDS/ΜL (ref 0–0.1)
BASOPHILS NFR BLD AUTO: 0 % (ref 0–1)
BUN SERPL-MCNC: 3 MG/DL (ref 5–25)
CALCIUM SERPL-MCNC: 9.5 MG/DL (ref 8.4–10.2)
CHLORIDE SERPL-SCNC: 98 MMOL/L (ref 96–108)
CO2 SERPL-SCNC: 33 MMOL/L (ref 21–32)
CREAT SERPL-MCNC: 0.56 MG/DL (ref 0.6–1.3)
EOSINOPHIL # BLD AUTO: 0.12 THOUSAND/ΜL (ref 0–0.61)
EOSINOPHIL NFR BLD AUTO: 2 % (ref 0–6)
ERYTHROCYTE [DISTWIDTH] IN BLOOD BY AUTOMATED COUNT: 16.1 % (ref 11.6–15.1)
GFR SERPL CREATININE-BSD FRML MDRD: 98 ML/MIN/1.73SQ M
GLUCOSE SERPL-MCNC: 90 MG/DL (ref 65–140)
HCT VFR BLD AUTO: 33.9 % (ref 34.8–46.1)
HGB BLD-MCNC: 11.1 G/DL (ref 11.5–15.4)
IMM GRANULOCYTES # BLD AUTO: 0.03 THOUSAND/UL (ref 0–0.2)
IMM GRANULOCYTES NFR BLD AUTO: 1 % (ref 0–2)
LYMPHOCYTES # BLD AUTO: 0.83 THOUSANDS/ΜL (ref 0.6–4.47)
LYMPHOCYTES NFR BLD AUTO: 15 % (ref 14–44)
MCH RBC QN AUTO: 28.2 PG (ref 26.8–34.3)
MCHC RBC AUTO-ENTMCNC: 32.7 G/DL (ref 31.4–37.4)
MCV RBC AUTO: 86 FL (ref 82–98)
MONOCYTES # BLD AUTO: 0.32 THOUSAND/ΜL (ref 0.17–1.22)
MONOCYTES NFR BLD AUTO: 6 % (ref 4–12)
NEUTROPHILS # BLD AUTO: 4.15 THOUSANDS/ΜL (ref 1.85–7.62)
NEUTS SEG NFR BLD AUTO: 76 % (ref 43–75)
NRBC BLD AUTO-RTO: 0 /100 WBCS
PLATELET # BLD AUTO: 245 THOUSANDS/UL (ref 149–390)
PMV BLD AUTO: 8.4 FL (ref 8.9–12.7)
POTASSIUM SERPL-SCNC: 3.4 MMOL/L (ref 3.5–5.3)
RBC # BLD AUTO: 3.94 MILLION/UL (ref 3.81–5.12)
SODIUM SERPL-SCNC: 137 MMOL/L (ref 135–147)
WBC # BLD AUTO: 5.47 THOUSAND/UL (ref 4.31–10.16)

## 2024-09-18 PROCEDURE — 85025 COMPLETE CBC W/AUTO DIFF WBC: CPT

## 2024-09-18 PROCEDURE — 80048 BASIC METABOLIC PNL TOTAL CA: CPT | Performed by: INTERNAL MEDICINE

## 2024-09-18 PROCEDURE — 99233 SBSQ HOSP IP/OBS HIGH 50: CPT | Performed by: INTERNAL MEDICINE

## 2024-09-18 RX ORDER — POTASSIUM CHLORIDE 1500 MG/1
40 TABLET, EXTENDED RELEASE ORAL ONCE
Status: COMPLETED | OUTPATIENT
Start: 2024-09-18 | End: 2024-09-18

## 2024-09-18 RX ORDER — POTASSIUM CHLORIDE 1500 MG/1
40 TABLET, EXTENDED RELEASE ORAL ONCE
Status: DISCONTINUED | OUTPATIENT
Start: 2024-09-18 | End: 2024-09-18

## 2024-09-18 RX ADMIN — SODIUM CHLORIDE, SODIUM GLUCONATE, SODIUM ACETATE, POTASSIUM CHLORIDE, MAGNESIUM CHLORIDE, SODIUM PHOSPHATE, DIBASIC, AND POTASSIUM PHOSPHATE 75 ML/HR: .53; .5; .37; .037; .03; .012; .00082 INJECTION, SOLUTION INTRAVENOUS at 02:23

## 2024-09-18 RX ADMIN — LORAZEPAM 2 MG: 1 TABLET ORAL at 21:15

## 2024-09-18 RX ADMIN — PANTOPRAZOLE SODIUM 40 MG: 40 TABLET, DELAYED RELEASE ORAL at 21:15

## 2024-09-18 RX ADMIN — SUCRALFATE 1 G: 1 TABLET ORAL at 09:17

## 2024-09-18 RX ADMIN — SUCRALFATE 1 G: 1 TABLET ORAL at 12:00

## 2024-09-18 RX ADMIN — SUCRALFATE 1 G: 1 TABLET ORAL at 16:29

## 2024-09-18 RX ADMIN — LEVOTHYROXINE SODIUM 25 MCG: 25 TABLET ORAL at 05:09

## 2024-09-18 RX ADMIN — METOCLOPRAMIDE 5 MG: 5 TABLET ORAL at 12:00

## 2024-09-18 RX ADMIN — CEFTRIAXONE SODIUM 2000 MG: 10 INJECTION, POWDER, FOR SOLUTION INTRAVENOUS at 09:17

## 2024-09-18 RX ADMIN — QUETIAPINE FUMARATE 400 MG: 200 TABLET, EXTENDED RELEASE ORAL at 21:15

## 2024-09-18 RX ADMIN — SODIUM CHLORIDE 2 G: 1 TABLET ORAL at 21:15

## 2024-09-18 RX ADMIN — SODIUM CHLORIDE 2 G: 1 TABLET ORAL at 09:18

## 2024-09-18 RX ADMIN — METOCLOPRAMIDE 5 MG: 5 TABLET ORAL at 16:29

## 2024-09-18 RX ADMIN — PANTOPRAZOLE SODIUM 40 MG: 40 TABLET, DELAYED RELEASE ORAL at 09:17

## 2024-09-18 RX ADMIN — METOCLOPRAMIDE 5 MG: 5 TABLET ORAL at 09:17

## 2024-09-18 RX ADMIN — PAROXETINE HYDROCHLORIDE 60 MG: 20 TABLET, FILM COATED ORAL at 09:18

## 2024-09-18 RX ADMIN — POTASSIUM CHLORIDE 40 MEQ: 1500 TABLET, EXTENDED RELEASE ORAL at 16:29

## 2024-09-18 RX ADMIN — LORAZEPAM 2 MG: 1 TABLET ORAL at 09:17

## 2024-09-18 NOTE — PLAN OF CARE
Problem: GASTROINTESTINAL - ADULT  Goal: Minimal or absence of nausea and/or vomiting  Description: INTERVENTIONS:  - Administer IV fluids if ordered to ensure adequate hydration  - Maintain NPO status until nausea and vomiting are resolved  - Nasogastric tube if ordered  - Administer ordered antiemetic medications as needed  - Provide nonpharmacologic comfort measures as appropriate  - Advance diet as tolerated, if ordered  - Consider nutrition services referral to assist patient with adequate nutrition and appropriate food choices  Outcome: Progressing  Goal: Maintains or returns to baseline bowel function  Description: INTERVENTIONS:  - Assess bowel function  - Encourage oral fluids to ensure adequate hydration  - Administer IV fluids if ordered to ensure adequate hydration  - Administer ordered medications as needed  - Encourage mobilization and activity  - Consider nutritional services referral to assist patient with adequate nutrition and appropriate food choices  Outcome: Progressing  Goal: Maintains adequate nutritional intake  Description: INTERVENTIONS:  - Monitor percentage of each meal consumed  - Identify factors contributing to decreased intake, treat as appropriate  - Assist with meals as needed  - Monitor I&O, weight, and lab values if indicated  - Obtain nutrition services referral as needed  Outcome: Progressing  Goal: Oral mucous membranes remain intact  Description: INTERVENTIONS  - Assess oral mucosa and hygiene practices  - Implement preventative oral hygiene regimen  - Implement oral medicated treatments as ordered  - Initiate Nutrition services referral as needed  Outcome: Progressing     Problem: GENITOURINARY - ADULT  Goal: Maintains or returns to baseline urinary function  Description: INTERVENTIONS:  - Assess urinary function  - Encourage oral fluids to ensure adequate hydration if ordered  - Administer IV fluids as ordered to ensure adequate hydration  - Administer ordered medications  as needed  - Offer frequent toileting  - Follow urinary retention protocol if ordered  Outcome: Progressing  Goal: Absence of urinary retention  Description: INTERVENTIONS:  - Assess patient’s ability to void and empty bladder  - Monitor I/O  - Bladder scan as needed  - Discuss with physician/AP medications to alleviate retention as needed  - Discuss catheterization for long term situations as appropriate  Outcome: Progressing     Problem: METABOLIC, FLUID AND ELECTROLYTES - ADULT  Goal: Electrolytes maintained within normal limits  Description: INTERVENTIONS:  - Monitor labs and assess patient for signs and symptoms of electrolyte imbalances  - Administer electrolyte replacement as ordered  - Monitor response to electrolyte replacements, including repeat lab results as appropriate  - Instruct patient on fluid and nutrition as appropriate  Outcome: Progressing  Goal: Fluid balance maintained  Description: INTERVENTIONS:  - Monitor labs   - Monitor I/O and WT  - Instruct patient on fluid and nutrition as appropriate  - Assess for signs & symptoms of volume excess or deficit  Outcome: Progressing

## 2024-09-18 NOTE — RESTORATIVE TECHNICIAN NOTE
Restorative Technician Note      Patient Name: Irene Plascencia     Restorative Tech Visit Date: 09/18/24  Note Type: Mobility  Patient Position Upon Consult: Supine  Activity Performed: Ambulated; Transferred; Stood  Assistive Device: Other (Comment) (HHA X 1)  Education Provided: Yes  Patient Position at End of Consult: Bedside chair; All needs within reach; Bed/Chair alarm activated    Niya Lin Restorative Tech

## 2024-09-18 NOTE — UTILIZATION REVIEW
Date:     9/18  Day 3: Has surpassed a 2nd midnight with active treatments and services.    Remains on  VIN.  IV  reglan d/c  and  changed to po.  Remains on  IVF  75/hr.  Continue aspiration precautions.  Monitor labs.     Hemoglobin today  11.1.   K  3.4

## 2024-09-18 NOTE — PLAN OF CARE
Problem: Potential for Falls  Goal: Patient will remain free of falls  Description: INTERVENTIONS:  - Educate patient/family on patient safety including physical limitations  - Instruct patient to call for assistance with activity   - Consult OT/PT to assist with strengthening/mobility   - Keep Call bell within reach  - Keep bed low and locked with side rails adjusted as appropriate  - Keep care items and personal belongings within reach  - Initiate and maintain comfort rounds  - Make Fall Risk Sign visible to staff  - Offer Toileting every 2 Hours, in advance of need  - Initiate/Maintain bed alarm  - Obtain necessary fall risk management equipment:    Problem: GASTROINTESTINAL - ADULT  Goal: Maintains or returns to baseline bowel function  Description: INTERVENTIONS:  - Assess bowel function  - Encourage oral fluids to ensure adequate hydration  - Administer IV fluids if ordered to ensure adequate hydration  - Administer ordered medications as needed  - Encourage mobilization and activity  - Consider nutritional services referral to assist patient with adequate nutrition and appropriate food choices  Outcome: Progressing     Problem: GASTROINTESTINAL - ADULT  Goal: Maintains adequate nutritional intake  Description: INTERVENTIONS:  - Monitor percentage of each meal consumed  - Identify factors contributing to decreased intake, treat as appropriate  - Assist with meals as needed  - Monitor I&O, weight, and lab values if indicated  - Obtain nutrition services referral as needed  Outcome: Progressing     Problem: GASTROINTESTINAL - ADULT  Goal: Oral mucous membranes remain intact  Description: INTERVENTIONS  - Assess oral mucosa and hygiene practices  - Implement preventative oral hygiene regimen  - Implement oral medicated treatments as ordered  - Initiate Nutrition services referral as needed  Outcome: Progressing     Problem: GENITOURINARY - ADULT  Goal: Maintains or returns to baseline urinary  function  Description: INTERVENTIONS:  - Assess urinary function  - Encourage oral fluids to ensure adequate hydration if ordered  - Administer IV fluids as ordered to ensure adequate hydration  - Administer ordered medications as needed  - Offer frequent toileting  - Follow urinary retention protocol if ordered  Outcome: Progressing     Problem: GENITOURINARY - ADULT  Goal: Absence of urinary retention  Description: INTERVENTIONS:  - Assess patient’s ability to void and empty bladder  - Monitor I/O  - Bladder scan as needed  - Discuss with physician/AP medications to alleviate retention as needed  - Discuss catheterization for long term situations as appropriate  Outcome: Progressing     Problem: METABOLIC, FLUID AND ELECTROLYTES - ADULT  Goal: Electrolytes maintained within normal limits  Description: INTERVENTIONS:  - Monitor labs and assess patient for signs and symptoms of electrolyte imbalances  - Administer electrolyte replacement as ordered  - Monitor response to electrolyte replacements, including repeat lab results as appropriate  - Instruct patient on fluid and nutrition as appropriate  Outcome: Progressing     Problem: METABOLIC, FLUID AND ELECTROLYTES - ADULT  Goal: Fluid balance maintained  Description: INTERVENTIONS:  - Monitor labs   - Monitor I/O and WT  - Instruct patient on fluid and nutrition as appropriate  - Assess for signs & symptoms of volume excess or deficit  Outcome: Progressing     - Apply yellow socks and bracelet for high fall risk patients  - Consider moving patient to room near nurses station  Outcome: Progressing

## 2024-09-18 NOTE — UTILIZATION REVIEW
"NOTIFICATION OF INPATIENT ADMISSION   AUTHORIZATION REQUEST   SERVICING FACILITY:   [facility]  Address: [address] [place] [address]  Tax ID: [id number]  NPI: [id number] ATTENDING PROVIDER:  Attending Name and NPI#: Kapil Daniels Do [[id number]]  Address: [address] [place] [address]  Phone: [phone]   ADMISSION INFORMATION:  Place of Service: Inpatient [facility]  Place of Service Code: 21  Inpatient Admission Date/Time: 9/16/24 11:01 AM  Discharge Date/Time: No discharge date for patient encounter.  Admitting Diagnosis Code/Description:  Hematemesis [K92.0]  Vomiting blood [K92.0]  Esophagitis [K20.90]     UTILIZATION REVIEW CONTACT:  Doris Phelps"Janae\" Domingo Utilization   Network Utilization Review Department  Phone: [phone]  Fax: [phone]  Email: [email or web address]  Contact for approvals/pending authorizations, clinical reviews, and discharge.     PHYSICIAN ADVISORY SERVICES:  Medical Necessity Denial & Krvp-ih-Elig Review  Phone: [phone]  Fax: [phone]  Email: [email or web address]     DISCHARGE SUPPORT TEAM:  For Patients Discharge Needs & Updates  Phone: [phone] opt. 2 Fax: [phone]  Email: [email or web address]      "

## 2024-09-18 NOTE — PROGRESS NOTES
Progress Note -     Name: Irene Plascencia 64 y.o. female I MRN: [id number]  [facility] Date of Admission: 9/16/2024   Date of Service: 9/18/2024 I Hospital Day: 2     This SmartSection is not supported in the current .     Pastoral Care Progress Note              Father Karime provided a blessing and prayers.      09/18/24 1100   Clinical Encounter Type   Visited With Patient   Crisis Visit Critical Care

## 2024-09-18 NOTE — PROGRESS NOTES
Progress Note - Hospitalist   Name: Irene Plascencia 64 y.o. female I MRN: [id number]  Unit/Bed#: [facility] 205-01 I Date of Admission: 2024   Date of Service: 2024 I Hospital Day: 2     Assessment & Plan  Aspiration pneumonia (HCC)  Likely aspiration pneumonia from vomiting  Check COVID/RSV/Legionella/strep  Empiric antibiotic  Wean off oxygen as tolerated  Incentive spirometry  Aspiration precaution  Upper GI bleed  Patient with history of esophagitis  Taking NSAIDs although taking PPI and sucralfate  Presented with coffee-ground emesis for 1 week  Start IV PPI, monitor H&H every 8  GI consulted-need for repeat EGD/colonoscopy  GI recommended Reglan 5 mg IV 3 times daily before meals if QTc is normal.  Will obtain EKG.  Hold Geodon while on Reglan to avoid QTc prolongation  Acquired hypothyroidism  Continue Synthroid  Schizoaffective disorder (HCC)  .  PTA home meds  Esophagitis  On Protonix sucralfate  GI on board-May need repeat EGD  Hyponatremia  History of hyponatremia on salt tablets  Monitor sodium closely  No acute altered mental status    Mechanical VTE Prophylaxis in Place: No    Patient Centered Rounds: I have performed bedside rounds with nursing staff today.      Time Spent for Care: 1 hour.  More than 50% of total time spent on counseling and coordination of care as described above.    Current Length of Stay: 2 day(s)    Current Patient Status: Inpatient       Code Status: Level 1 - Full Code      Subjective:   nad    Objective:     Vitals:   Temp (24hrs), Av.1 °F (36.7 °C), Min:97.9 °F (36.6 °C), Max:98.2 °F (36.8 °C)    Temp:  [97.9 °F (36.6 °C)-98.2 °F (36.8 °C)] 98.1 °F (36.7 °C)  HR:  [72-78] 72  Resp:  [16-18] 16  BP: (120-134)/(61-82) 131/82  SpO2:  [92 %-99 %] 97 %  Body mass index is 33.93 kg/m².     Input and Output Summary (last 24 hours):       Intake/Output Summary (Last 24 hours) at 2024 1453  Last data filed at 2024 1239  Gross per 24 hour   Intake 2071.25 ml    Output 2700 ml   Net -628.75 ml       Physical Exam:     Physical Exam  Constitutional:       Appearance: Normal appearance.   HENT:      Head: Normocephalic and atraumatic.   Cardiovascular:      Rate and Rhythm: Normal rate and regular rhythm.      Pulses: Normal pulses.   Skin:     General: Skin is warm and dry.   Neurological:      General: No focal deficit present.      Mental Status: She is alert.         Additional Data:     Labs:    Results from last 7 days   Lab Units 09/18/24  0521   WBC Thousand/uL 5.47   HEMOGLOBIN g/dL 11.1*   HEMATOCRIT % 33.9*   PLATELETS Thousands/uL 245   SEGS PCT % 76*   LYMPHO PCT % 15   MONO PCT % 6   EOS PCT % 2     Results from last 7 days   Lab Units 09/18/24  1213 09/16/24  1655 09/16/24  0757   POTASSIUM mmol/L 3.4*   < > 3.4*   CHLORIDE mmol/L 98   < > 88*   CO2 mmol/L 33*   < > 27   BUN mg/dL 3*   < > 8   CREATININE mg/dL 0.56*   < > 0.77   CALCIUM mg/dL 9.5   < > 9.4   ALK PHOS U/L  --   --  72   ALT U/L  --   --  31   AST U/L  --   --  34    < > = values in this interval not displayed.     Results from last 7 days   Lab Units 09/16/24  0836   INR  1.06       Recent Cultures (last 7 days):     Results from last 7 days   Lab Units 09/16/24  1529   LEGIONELLA URINARY ANTIGEN  Negative       Last 24 Hours Medication List:   Current Facility-Administered Medications   Medication Dose Route Frequency Provider Last Rate    acetaminophen  650 mg Oral Q6H PRN July Desai MD      cefTRIAXone  2,000 mg Intravenous Q24H July Desai MD 2,000 mg (09/18/24 0917)    levothyroxine  25 mcg Oral Early Morning July Desai MD      LORazepam  2 mg Oral Q4H PRN July Desai MD      metoclopramide  5 mg Oral TID AC Stefani Flynn MD      multi-electrolyte  75 mL/hr Intravenous Continuous July Desai MD 75 mL/hr (09/18/24 0223)    ondansetron  4 mg Intravenous Q6H PRN July Desai MD      pantoprazole  40 mg Oral BID Stefani Flynn MD       PARoxetine  60 mg Oral Daily July Desai MD      QUEtiapine  400 mg Oral HS Debbie Moore PA-C      sodium chloride  2 g Oral BID July Desai MD      sucralfate  1 g Oral TID With Meals July Desai MD      [Held by provider] ziprasidone  80 mg Oral Daily July Desai MD          Today, Patient Was Seen By: Kapil Daniels DO    ** Please Note: Dictation voice to text software may have been used in the creation of this document. **

## 2024-09-18 NOTE — PROGRESS NOTES
Father debbie conducted a long follow up visit with this patient and offered prayers and a blessing to her.    09/18/24 1500   Clinical Encounter Type   Visited With Patient   Routine Visit Follow-up

## 2024-09-19 LAB
ANION GAP SERPL CALCULATED.3IONS-SCNC: 8 MMOL/L (ref 4–13)
BASOPHILS # BLD AUTO: 0.04 THOUSANDS/ΜL (ref 0–0.1)
BASOPHILS NFR BLD AUTO: 1 % (ref 0–1)
BUN SERPL-MCNC: 4 MG/DL (ref 5–25)
CALCIUM SERPL-MCNC: 8.9 MG/DL (ref 8.4–10.2)
CHLORIDE SERPL-SCNC: 102 MMOL/L (ref 96–108)
CO2 SERPL-SCNC: 31 MMOL/L (ref 21–32)
CREAT SERPL-MCNC: 0.52 MG/DL (ref 0.6–1.3)
EOSINOPHIL # BLD AUTO: 0.14 THOUSAND/ΜL (ref 0–0.61)
EOSINOPHIL NFR BLD AUTO: 4 % (ref 0–6)
ERYTHROCYTE [DISTWIDTH] IN BLOOD BY AUTOMATED COUNT: 16.3 % (ref 11.6–15.1)
GFR SERPL CREATININE-BSD FRML MDRD: 101 ML/MIN/1.73SQ M
GLUCOSE SERPL-MCNC: 85 MG/DL (ref 65–140)
HCT VFR BLD AUTO: 32.3 % (ref 34.8–46.1)
HGB BLD-MCNC: 10.6 G/DL (ref 11.5–15.4)
IMM GRANULOCYTES # BLD AUTO: 0.04 THOUSAND/UL (ref 0–0.2)
IMM GRANULOCYTES NFR BLD AUTO: 1 % (ref 0–2)
LYMPHOCYTES # BLD AUTO: 0.63 THOUSANDS/ΜL (ref 0.6–4.47)
LYMPHOCYTES NFR BLD AUTO: 20 % (ref 14–44)
MCH RBC QN AUTO: 28.5 PG (ref 26.8–34.3)
MCHC RBC AUTO-ENTMCNC: 32.8 G/DL (ref 31.4–37.4)
MCV RBC AUTO: 87 FL (ref 82–98)
MONOCYTES # BLD AUTO: 0.33 THOUSAND/ΜL (ref 0.17–1.22)
MONOCYTES NFR BLD AUTO: 11 % (ref 4–12)
NEUTROPHILS # BLD AUTO: 1.97 THOUSANDS/ΜL (ref 1.85–7.62)
NEUTS SEG NFR BLD AUTO: 63 % (ref 43–75)
NRBC BLD AUTO-RTO: 0 /100 WBCS
PLATELET # BLD AUTO: 251 THOUSANDS/UL (ref 149–390)
PMV BLD AUTO: 8.9 FL (ref 8.9–12.7)
POTASSIUM SERPL-SCNC: 3.9 MMOL/L (ref 3.5–5.3)
RBC # BLD AUTO: 3.72 MILLION/UL (ref 3.81–5.12)
SODIUM SERPL-SCNC: 141 MMOL/L (ref 135–147)
WBC # BLD AUTO: 3.15 THOUSAND/UL (ref 4.31–10.16)

## 2024-09-19 PROCEDURE — 99233 SBSQ HOSP IP/OBS HIGH 50: CPT | Performed by: INTERNAL MEDICINE

## 2024-09-19 PROCEDURE — 85025 COMPLETE CBC W/AUTO DIFF WBC: CPT | Performed by: INTERNAL MEDICINE

## 2024-09-19 PROCEDURE — 80048 BASIC METABOLIC PNL TOTAL CA: CPT | Performed by: INTERNAL MEDICINE

## 2024-09-19 RX ADMIN — CEFTRIAXONE SODIUM 2000 MG: 10 INJECTION, POWDER, FOR SOLUTION INTRAVENOUS at 08:57

## 2024-09-19 RX ADMIN — METOCLOPRAMIDE 5 MG: 5 TABLET ORAL at 16:22

## 2024-09-19 RX ADMIN — QUETIAPINE FUMARATE 400 MG: 200 TABLET, EXTENDED RELEASE ORAL at 21:37

## 2024-09-19 RX ADMIN — LEVOTHYROXINE SODIUM 25 MCG: 25 TABLET ORAL at 06:05

## 2024-09-19 RX ADMIN — SUCRALFATE 1 G: 1 TABLET ORAL at 08:56

## 2024-09-19 RX ADMIN — METOCLOPRAMIDE 5 MG: 5 TABLET ORAL at 06:05

## 2024-09-19 RX ADMIN — PAROXETINE HYDROCHLORIDE 60 MG: 20 TABLET, FILM COATED ORAL at 08:55

## 2024-09-19 RX ADMIN — METOCLOPRAMIDE 5 MG: 5 TABLET ORAL at 11:07

## 2024-09-19 RX ADMIN — SODIUM CHLORIDE 2 G: 1 TABLET ORAL at 08:55

## 2024-09-19 RX ADMIN — PANTOPRAZOLE SODIUM 40 MG: 40 TABLET, DELAYED RELEASE ORAL at 21:38

## 2024-09-19 RX ADMIN — PANTOPRAZOLE SODIUM 40 MG: 40 TABLET, DELAYED RELEASE ORAL at 08:55

## 2024-09-19 RX ADMIN — SUCRALFATE 1 G: 1 TABLET ORAL at 11:07

## 2024-09-19 RX ADMIN — SUCRALFATE 1 G: 1 TABLET ORAL at 16:22

## 2024-09-19 NOTE — PROGRESS NOTES
Progress Note - Hospitalist   Name: Irene Plascencia 64 y.o. female I MRN: [id number]  Unit/Bed#: [facility] I Date of Admission: 2024   Date of Service: 2024 I Hospital Day: 3     Assessment & Plan  Aspiration pneumonia (HCC)  Likely aspiration pneumonia from vomiting  Check COVID/RSV/Legionella/strep  Empiric antibiotic  Wean off oxygen as tolerated  Incentive spirometry  Aspiration precaution  Upper GI bleed  Patient with history of esophagitis  Taking NSAIDs although taking PPI and sucralfate  Presented with coffee-ground emesis for 1 week  Start IV PPI, monitor H&H every 8  GI consulted-need for repeat EGD/colonoscopy  GI recommended Reglan 5 mg IV 3 times daily before meals if QTc is normal.  Will obtain EKG.  Hold Geodon while on Reglan to avoid QTc prolongation  Acquired hypothyroidism  Continue Synthroid  Schizoaffective disorder (HCC)  .  PTA home meds  Esophagitis  On Protonix sucralfate  GI on board-May need repeat EGD  Hyponatremia  History of hyponatremia on salt tablets  Monitor sodium closely  No acute altered mental status    Patient Centered Rounds: I have performed bedside rounds with nursing staff today.    \  Time Spent for Care: 1 hour.  More than 50% of total time spent on counseling and coordination of care as described above.    Current Length of Stay: 3 day(s)    Current Patient Status: Inpatient       Code Status: Level 1 - Full Code      Subjective:   nad    Objective:     Vitals:   Temp (24hrs), Av.7 °F (36.5 °C), Min:97.2 °F (36.2 °C), Max:98.2 °F (36.8 °C)    Temp:  [97.2 °F (36.2 °C)-98.2 °F (36.8 °C)] 97.5 °F (36.4 °C)  HR:  [79-99] 99  Resp:  [16-18] 16  BP: (117-149)/(71-95) 149/95  SpO2:  [87 %-99 %] 96 %  Body mass index is 33.93 kg/m².     Input and Output Summary (last 24 hours):       Intake/Output Summary (Last 24 hours) at 2024 0856  Last data filed at 2024 0554  Gross per 24 hour   Intake 1060 ml   Output 1900 ml   Net -840 ml       Physical Exam:      Physical Exam  Constitutional:       Appearance: Normal appearance.   HENT:      Head: Normocephalic and atraumatic.   Cardiovascular:      Rate and Rhythm: Normal rate and regular rhythm.   Pulmonary:      Effort: Pulmonary effort is normal.      Breath sounds: Normal breath sounds.   Abdominal:      General: Abdomen is flat.      Palpations: Abdomen is soft.   Skin:     General: Skin is warm.   Neurological:      General: No focal deficit present.      Mental Status: She is alert and oriented to person, place, and time.         Additional Data:     Labs:    Results from last 7 days   Lab Units 09/19/24  0547   WBC Thousand/uL 3.15*   HEMOGLOBIN g/dL 10.6*   HEMATOCRIT % 32.3*   PLATELETS Thousands/uL 251   SEGS PCT % 63   LYMPHO PCT % 20   MONO PCT % 11   EOS PCT % 4     Results from last 7 days   Lab Units 09/19/24  0547 09/16/24  1655 09/16/24  0757   POTASSIUM mmol/L 3.9   < > 3.4*   CHLORIDE mmol/L 102   < > 88*   CO2 mmol/L 31   < > 27   BUN mg/dL 4*   < > 8   CREATININE mg/dL 0.52*   < > 0.77   CALCIUM mg/dL 8.9   < > 9.4   ALK PHOS U/L  --   --  72   ALT U/L  --   --  31   AST U/L  --   --  34    < > = values in this interval not displayed.     Results from last 7 days   Lab Units 09/16/24  0836   INR  1.06           Recent Cultures (last 7 days):     Results from last 7 days   Lab Units 09/16/24  1529   LEGIONELLA URINARY ANTIGEN  Negative       Last 24 Hours Medication List:   Current Facility-Administered Medications   Medication Dose Route Frequency Provider Last Rate    acetaminophen  650 mg Oral Q6H PRN July Desai MD      cefTRIAXone  2,000 mg Intravenous Q24H July Desai MD 2,000 mg (09/18/24 0917)    levothyroxine  25 mcg Oral Early Morning July Desai MD      LORazepam  2 mg Oral Q4H PRN July Desai MD      metoclopramide  5 mg Oral TID AC Stefani Flynn MD      multi-electrolyte  75 mL/hr Intravenous Continuous July Desai MD 75 mL/hr (09/18/24 0800)     ondansetron  4 mg Intravenous Q6H PRN July Desai MD      pantoprazole  40 mg Oral BID Stefani Flynn MD      PARoxetine  60 mg Oral Daily July Desai MD      QUEtiapine  400 mg Oral HS Debbie Moore PA-C      sodium chloride  2 g Oral BID July Desai MD      sucralfate  1 g Oral TID With Meals July Desai MD      [Held by provider] ziprasidone  80 mg Oral Daily July Desai MD          Today, Patient Was Seen By: Kapil Daniels DO    ** Please Note: Dictation voice to text software may have been used in the creation of this document. **

## 2024-09-19 NOTE — ED PROVIDER NOTES
1. Hematemesis    2. Esophagitis      ED Disposition       ED Disposition   Admit    Condition   Stable    Date/Time   Mon Sep 16, 2024 11:01 AM    Comment   Case was discussed with [facility] and the patient's admission status was agreed to be Admission Status: inpatient status to the service of Dr. ortiz .               Assessment & Plan       Medical Decision Making  Irene Plascencia is a 64 y.o. female who presents to the emergency department for complaint of coffee-ground emesis    I will order appropriate testing to narrow my differential    Differential diagnosis includes but is not limited to: Upper GI bleed, esophagitis, peptic ulcer    Patient is relatively well-appearing on exam.  Abdominal exam is reassuring.  Vital signs and mental status stable.  She had a bowel movement here in the emergency department without any blood.  No vomiting in the emergency department.    Workup here reveals stable hemoglobin.  She has chronic hyponatremia that is also reflected in her labs here her sodium appears stable.  Incidentally on CT left-sided pneumonia.  Will treat with antibiotics and admit for pneumonia as well as her complaint of coffee-ground emesis.      Amount and/or Complexity of Data Reviewed  Labs: ordered. Decision-making details documented in ED Course.  Radiology: ordered and independent interpretation performed.    Risk  Prescription drug management.  Decision regarding hospitalization.                  ED Course as of 09/19/24 0036   Mon Sep 16, 2024   0854 CBC and differential(!)  Hemoglobin stable.  Mild hyponatremia giving IV fluids.   0856 Chronic hyponatremia per records review       Medications   multi-electrolyte (PLASMALYTE-A/ISOLYTE-S PH 7.4) IV solution (75 mL/hr Intravenous New Bag 9/16/24 1138)   acetaminophen (TYLENOL) tablet 650 mg (has no administration in time range)   ondansetron (ZOFRAN) injection 4 mg (has no administration in time range)   sodium chloride 0.9 % bolus 1,000 mL  (0 mL Intravenous Stopped 9/16/24 1013)   pantoprazole (PROTONIX) injection 40 mg (40 mg Intravenous Given 9/16/24 0840)   iohexol (OMNIPAQUE) 350 MG/ML injection (MULTI-DOSE) 100 mL (100 mL Intravenous Given 9/16/24 0853)   ceftriaxone (ROCEPHIN) 2 g/50 mL in dextrose IVPB (0 mg Intravenous Stopped 9/16/24 1103)   ondansetron (ZOFRAN) injection 4 mg (4 mg Intravenous Given 9/16/24 1020)       History of Present Illness       Patient is a 64-year-old female who presents to the ED with chief complaint of coffee-ground emesis.  She notes that she has had a few episodes of this over the past week and had 1 episode this morning so she called ambulance.  She has not had any chest pain or shortness of breath.  No dizziness, increased fatigue.  She does not take any blood thinners.  No bright blood or melena per rectum.              Review of Systems   Constitutional:  Negative for activity change, appetite change, chills and fever.   Respiratory:  Negative for apnea, cough, choking, chest tightness, shortness of breath, wheezing and stridor.    Cardiovascular:  Negative for chest pain and leg swelling.   Gastrointestinal:  Negative for abdominal pain.   Genitourinary:  Negative for dysuria.   Neurological:  Negative for seizures and syncope.           Objective     ED Triage Vitals [09/16/24 0744]   Temperature Pulse Blood Pressure Respirations SpO2 Patient Position - Orthostatic VS   98 °F (36.7 °C) 103 126/58 20 94 % Lying      Temp Source Heart Rate Source BP Location FiO2 (%) Pain Score    Oral Monitor Right arm -- No Pain        Physical Exam  Vitals reviewed. Exam conducted with a chaperone present (Nurses from section).   Constitutional:       General: She is not in acute distress.     Appearance: Normal appearance. She is ill-appearing (Chronically ill-appearing). She is not toxic-appearing or diaphoretic.   HENT:      Head: Normocephalic and atraumatic.      Nose: Nose normal.      Mouth/Throat:      Mouth:  Mucous membranes are moist.      Pharynx: Oropharynx is clear. No oropharyngeal exudate or posterior oropharyngeal erythema.      Comments: No blood in posterior oropharynx  Eyes:      General: No scleral icterus.        Right eye: No discharge.         Left eye: No discharge.      Pupils: Pupils are equal, round, and reactive to light.   Cardiovascular:      Rate and Rhythm: Normal rate and regular rhythm.      Pulses: Normal pulses.      Heart sounds: Normal heart sounds. No murmur heard.     No friction rub. No gallop.   Pulmonary:      Effort: Pulmonary effort is normal. No tachypnea, bradypnea, accessory muscle usage or respiratory distress.      Breath sounds: No wheezing or rales.   Abdominal:      General: Abdomen is flat. There is no distension.      Palpations: Abdomen is soft.      Tenderness: There is abdominal tenderness (Epigastric). There is no guarding.   Genitourinary:     Pubic Area: No rash.       Comments: Hemoccult negative  Musculoskeletal:      Right lower leg: No edema.      Left lower leg: No edema.   Skin:     General: Skin is warm and dry.      Capillary Refill: Capillary refill takes less than 2 seconds.   Neurological:      Mental Status: She is alert and oriented to person, place, and time.      GCS: GCS eye subscore is 4. GCS verbal subscore is 5. GCS motor subscore is 6.      Cranial Nerves: No dysarthria or facial asymmetry.   Psychiatric:         Mood and Affect: Mood normal.         Behavior: Behavior normal. Behavior is cooperative.         Thought Content: Thought content normal.         Judgment: Judgment normal.         Labs Reviewed   CBC AND DIFFERENTIAL - Abnormal       Result Value    WBC 12.38 (*)     RBC 4.69      Hemoglobin 13.3      Hematocrit 38.3      MCV 82      MCH 28.4      MCHC 34.7      RDW 15.9 (*)     MPV 8.3 (*)     Platelets 325      Narrative:     This is an appended report.  These results have been appended to a previously verified report.   COMPREHENSIVE  METABOLIC PANEL - Abnormal    Sodium 127 (*)     Potassium 3.4 (*)     Chloride 88 (*)     CO2 27      ANION GAP 12      BUN 8      Creatinine 0.77      Glucose 114      Calcium 9.4      AST 34      ALT 31      Alkaline Phosphatase 72      Total Protein 6.8      Albumin 4.1      Total Bilirubin 0.66      eGFR 81      Narrative:     National Kid[employer] Foundation guidelines for Chronic Kidney Disease (CKD):     Stage 1 with normal or high GFR (GFR > 90 mL/min/1.73 square meters)    Stage 2 Mild CKD (GFR = 60-89 mL/min/1.73 square meters)    Stage 3A Moderate CKD (GFR = 45-59 mL/min/1.73 square meters)    Stage 3B Moderate CKD (GFR = 30-44 mL/min/1.73 square meters)    Stage 4 Severe CKD (GFR = 15-29 mL/min/1.73 square meters)    Stage 5 End Stage CKD (GFR <15 mL/min/1.73 square meters)  Note: GFR calculation is accurate only with a steady state creatinine   HEMOGLOBIN AND HEMATOCRIT, BLOOD - Abnormal    Hemoglobin 11.2 (*)     Hematocrit 32.9 (*)    HEMOGLOBIN AND HEMATOCRIT, BLOOD - Abnormal    Hemoglobin 11.3 (*)     Hematocrit 33.8 (*)    BASIC METABOLIC PANEL - Abnormal    Sodium 130 (*)     Potassium 3.0 (*)     Chloride 94 (*)     CO2 30      ANION GAP 6      BUN 6      Creatinine 0.59 (*)     Glucose 97      Calcium 8.9      eGFR 97      Narrative:     National Kid[employer] Disease Foundation guidelines for Chronic Kidney Disease (CKD):     Stage 1 with normal or high GFR (GFR > 90 mL/min/1.73 square meters)    Stage 2 Mild CKD (GFR = 60-89 mL/min/1.73 square meters)    Stage 3A Moderate CKD (GFR = 45-59 mL/min/1.73 square meters)    Stage 3B Moderate CKD (GFR = 30-44 mL/min/1.73 square meters)    Stage 4 Severe CKD (GFR = 15-29 mL/min/1.73 square meters)    Stage 5 End Stage CKD (GFR <15 mL/min/1.73 square meters)  Note: GFR calculation is accurate only with a steady state creatinine   HEMOGLOBIN AND HEMATOCRIT, BLOOD - Abnormal    Hemoglobin 10.4 (*)     Hematocrit 31.3 (*)    BASIC METABOLIC PANEL - Abnormal     Sodium 133 (*)     Potassium 3.1 (*)     Chloride 94 (*)     CO2 32      ANION GAP 7      BUN 3 (*)     Creatinine 0.43 (*)     Glucose 105      Calcium 8.9      eGFR 107      Narrative:     National [employer] Disease Foundation guidelines for Chronic Kidney Disease (CKD):     Stage 1 with normal or high GFR (GFR > 90 mL/min/1.73 square meters)    Stage 2 Mild CKD (GFR = 60-89 mL/min/1.73 square meters)    Stage 3A Moderate CKD (GFR = 45-59 mL/min/1.73 square meters)    Stage 3B Moderate CKD (GFR = 30-44 mL/min/1.73 square meters)    Stage 4 Severe CKD (GFR = 15-29 mL/min/1.73 square meters)    Stage 5 End Stage CKD (GFR <15 mL/min/1.73 square meters)  Note: GFR calculation is accurate only with a steady state creatinine   CBC AND DIFFERENTIAL - Abnormal    WBC 9.98      RBC 3.93      Hemoglobin 11.1 (*)     Hematocrit 32.9 (*)     MCV 84      MCH 28.2      MCHC 33.7      RDW 15.9 (*)     MPV 8.3 (*)     Platelets 252      Narrative:     See Manual Diff Done Within 3 Days  This is an appended report.  These results have been appended to a previously verified report.   HEMOGLOBIN AND HEMATOCRIT, BLOOD - Abnormal    Hemoglobin 11.0 (*)     Hematocrit 33.2 (*)    CBC AND DIFFERENTIAL - Abnormal    WBC 5.47      RBC 3.94      Hemoglobin 11.1 (*)     Hematocrit 33.9 (*)     MCV 86      MCH 28.2      MCHC 32.7      RDW 16.1 (*)     MPV 8.4 (*)     Platelets 245      nRBC 0      Segmented % 76 (*)     Immature Grans % 1      Lymphocytes % 15      Monocytes % 6      Eosinophils Relative 2      Basophils Relative 0      Absolute Neutrophils 4.15      Absolute Immature Grans 0.03      Absolute Lymphocytes 0.83      Absolute Monocytes 0.32      Eosinophils Absolute 0.12      Basophils Absolute 0.02     BASIC METABOLIC PANEL - Abnormal    Sodium 137      Potassium 3.4 (*)     Chloride 98      CO2 33 (*)     ANION GAP 6      BUN 3 (*)     Creatinine 0.56 (*)     Glucose 90      Calcium 9.5      eGFR 98      Narrative:      National Kid[employer] Disease Foundation guidelines for Chronic Kidney Disease (CKD):     Stage 1 with normal or high GFR (GFR > 90 mL/min/1.73 square meters)    Stage 2 Mild CKD (GFR = 60-89 mL/min/1.73 square meters)    Stage 3A Moderate CKD (GFR = 45-59 mL/min/1.73 square meters)    Stage 3B Moderate CKD (GFR = 30-44 mL/min/1.73 square meters)    Stage 4 Severe CKD (GFR = 15-29 mL/min/1.73 square meters)    Stage 5 End Stage CKD (GFR <15 mL/min/1.73 square meters)  Note: GFR calculation is accurate only with a steady state creatinine   MANUAL DIFFERENTIAL(PHLEBS DO NOT ORDER) - Abnormal    Segmented % 61      Bands % 26 (*)     Lymphocytes % 5 (*)     Monocytes % 2 (*)     Eosinophils % 0      Basophils % 0      Metamyelocytes % 6 (*)     Absolute Neutrophils 10.77 (*)     Absolute Lymphocytes 0.62      Absolute Monocytes 0.25      Absolute Eosinophils 0.00      Absolute Basophils 0.00      Absolute Metamyelocytes 0.74 (*)     Total Counted        Platelet Estimate Adequate     COVID19, INFLUENZA A/B, RSV PCR, [facility] - Normal    SARS-CoV-2 Negative      INFLUENZA A PCR Negative      INFLUENZA B PCR Negative      RSV PCR Negative      Narrative:     This test has been performed using the CoV-2/Flu/RSV plus assay on the [email or web address] GeneXpert platform. This test has been validated by the  and verified by the performing laboratory.     This test is designed to amplify and detect the following: nucleocapsid (N), envelope (E), and RNA-dependent RNA polymerase (RdRP) genes of the SARS-CoV-2 genome; matrix (M), basic polymerase (PB2), and acidic protein (PA) segments of the influenza A genome; matrix (M) and non-structural protein (NS) segments of the influenza B genome, and the nucleocapsid genes of RSV A and RSV B.     Positive results are indicative of the presence of Flu A, Flu B, RSV, and/or SARS-CoV-2 RNA. Positive results for SARS-CoV-2 or suspected novel influenza should be reported to state, local, or federal  health departments according to local reporting requirements.      All results should be assessed in conjunction with clinical presentation and other laboratory markers for clinical management.     FOR PEDIATRIC PATIENTS - copy/paste COVID Guidelines URL to browser: [email or web address]      LEGIONELLA ANTIGEN, URINE - Normal    Legionella Urinary Antigen Negative     STREP PNEUMONIAE ANTIGEN,URINE - Normal    Strep pneumoniae antigen, urine Negative     HS TROPONIN I 0HR - Normal    hs TnI 0hr 3     HS TROPONIN I 2HR - Normal    hs TnI 2hr 3      Delta 2hr hsTnI 0     PROTIME-INR - Normal    Protime 14.1      INR 1.06      Narrative:     INR Therapeutic Range    Indication                                             INR Range      Atrial Fibrillation                                               2.0-3.0  Hypercoagulable State                                    2.0.2.3  Left Ventricular Asist Device                            2.0-3.0  Mechanical Heart Valve                                  -    Aortic(with afib, MI, embolism, HF, LA enlargement,    and/or coagulopathy)                                     2.0-3.0 (2.5-3.5)     Mitral                                                             2.5-3.5  Prosthetic/Bioprosthetic Heart Valve               2.0-3.0  Venous thromboembolism (VTE: VT, PE        2.0-3.0   TYPE AND SCREEN    ABO Grouping O      Rh Factor Positive      Antibody Screen Negative      Specimen Expiration Date 20240919       CT abdomen pelvis with contrast   Final Interpretation by Saran Reyes MD (09/16 0918)      1.  Consolidation within the visualized left lung compatible with pneumonia.   2.  Circumferential thickening of the distal esophagus suggestive of esophagitis. Patient noted to be scheduled for upper endoscopy.   3.  No acute intra-abdominal process.      The study was marked in EPIC for immediate notification.      Workstation  performed: [id number]         XR chest 1 view portable   ED Interpretation by Vivek Montelongo MD (09/16 0909)   Correction pneumonia on the left.      Final Interpretation by Walt Ma MD (09/16 1534)      Hazy patchy consolidation in the left lung concerning for pneumonia. Follow-up to radiographic resolution advised.            Workstation performed: [id number]             Procedures         Vivek Montelongo MD  09/19/24 0036

## 2024-09-20 VITALS
SYSTOLIC BLOOD PRESSURE: 151 MMHG | RESPIRATION RATE: 18 BRPM | DIASTOLIC BLOOD PRESSURE: 100 MMHG | WEIGHT: 185.5 LBS | TEMPERATURE: 97.5 F | OXYGEN SATURATION: 91 % | BODY MASS INDEX: 34.14 KG/M2 | HEART RATE: 95 BPM | HEIGHT: 62 IN

## 2024-09-20 PROBLEM — D72.819 LEUKOPENIA: Status: ACTIVE | Noted: 2024-09-20

## 2024-09-20 LAB
BASOPHILS # BLD AUTO: 0.03 THOUSANDS/ΜL (ref 0–0.1)
BASOPHILS # BLD AUTO: 0.04 THOUSANDS/ΜL (ref 0–0.1)
BASOPHILS NFR BLD AUTO: 1 % (ref 0–1)
BASOPHILS NFR BLD AUTO: 1 % (ref 0–1)
EOSINOPHIL # BLD AUTO: 0.1 THOUSAND/ΜL (ref 0–0.61)
EOSINOPHIL # BLD AUTO: 0.16 THOUSAND/ΜL (ref 0–0.61)
EOSINOPHIL NFR BLD AUTO: 3 % (ref 0–6)
EOSINOPHIL NFR BLD AUTO: 6 % (ref 0–6)
ERYTHROCYTE [DISTWIDTH] IN BLOOD BY AUTOMATED COUNT: 16.3 % (ref 11.6–15.1)
ERYTHROCYTE [DISTWIDTH] IN BLOOD BY AUTOMATED COUNT: 16.4 % (ref 11.6–15.1)
HCT VFR BLD AUTO: 37.4 % (ref 34.8–46.1)
HCT VFR BLD AUTO: 40.3 % (ref 34.8–46.1)
HGB BLD-MCNC: 12 G/DL (ref 11.5–15.4)
HGB BLD-MCNC: 13 G/DL (ref 11.5–15.4)
IMM GRANULOCYTES # BLD AUTO: 0.06 THOUSAND/UL (ref 0–0.2)
IMM GRANULOCYTES # BLD AUTO: 0.08 THOUSAND/UL (ref 0–0.2)
IMM GRANULOCYTES NFR BLD AUTO: 2 % (ref 0–2)
IMM GRANULOCYTES NFR BLD AUTO: 2 % (ref 0–2)
LYMPHOCYTES # BLD AUTO: 0.49 THOUSANDS/ΜL (ref 0.6–4.47)
LYMPHOCYTES # BLD AUTO: 0.62 THOUSANDS/ΜL (ref 0.6–4.47)
LYMPHOCYTES NFR BLD AUTO: 18 % (ref 14–44)
LYMPHOCYTES NFR BLD AUTO: 19 % (ref 14–44)
MCH RBC QN AUTO: 27.7 PG (ref 26.8–34.3)
MCH RBC QN AUTO: 28 PG (ref 26.8–34.3)
MCHC RBC AUTO-ENTMCNC: 32.1 G/DL (ref 31.4–37.4)
MCHC RBC AUTO-ENTMCNC: 32.3 G/DL (ref 31.4–37.4)
MCV RBC AUTO: 86 FL (ref 82–98)
MCV RBC AUTO: 87 FL (ref 82–98)
MONOCYTES # BLD AUTO: 0.3 THOUSAND/ΜL (ref 0.17–1.22)
MONOCYTES # BLD AUTO: 0.36 THOUSAND/ΜL (ref 0.17–1.22)
MONOCYTES NFR BLD AUTO: 10 % (ref 4–12)
MONOCYTES NFR BLD AUTO: 11 % (ref 4–12)
NEUTROPHILS # BLD AUTO: 1.59 THOUSANDS/ΜL (ref 1.85–7.62)
NEUTROPHILS # BLD AUTO: 2.34 THOUSANDS/ΜL (ref 1.85–7.62)
NEUTS SEG NFR BLD AUTO: 61 % (ref 43–75)
NEUTS SEG NFR BLD AUTO: 66 % (ref 43–75)
NRBC BLD AUTO-RTO: 0 /100 WBCS
NRBC BLD AUTO-RTO: 0 /100 WBCS
PLATELET # BLD AUTO: 292 THOUSANDS/UL (ref 149–390)
PLATELET # BLD AUTO: 292 THOUSANDS/UL (ref 149–390)
PMV BLD AUTO: 8.2 FL (ref 8.9–12.7)
PMV BLD AUTO: 8.7 FL (ref 8.9–12.7)
RBC # BLD AUTO: 4.33 MILLION/UL (ref 3.81–5.12)
RBC # BLD AUTO: 4.64 MILLION/UL (ref 3.81–5.12)
WBC # BLD AUTO: 2.63 THOUSAND/UL (ref 4.31–10.16)
WBC # BLD AUTO: 3.54 THOUSAND/UL (ref 4.31–10.16)

## 2024-09-20 PROCEDURE — 85025 COMPLETE CBC W/AUTO DIFF WBC: CPT

## 2024-09-20 PROCEDURE — 99239 HOSP IP/OBS DSCHRG MGMT >30: CPT | Performed by: INTERNAL MEDICINE

## 2024-09-20 PROCEDURE — 85025 COMPLETE CBC W/AUTO DIFF WBC: CPT | Performed by: INTERNAL MEDICINE

## 2024-09-20 RX ORDER — CEFDINIR 300 MG/1
300 CAPSULE ORAL EVERY 12 HOURS SCHEDULED
Qty: 6 CAPSULE | Refills: 0 | Status: SHIPPED | OUTPATIENT
Start: 2024-09-20 | End: 2024-09-23

## 2024-09-20 RX ADMIN — LORAZEPAM 2 MG: 1 TABLET ORAL at 08:30

## 2024-09-20 RX ADMIN — CEFTRIAXONE SODIUM 2000 MG: 10 INJECTION, POWDER, FOR SOLUTION INTRAVENOUS at 08:37

## 2024-09-20 RX ADMIN — LEVOTHYROXINE SODIUM 25 MCG: 25 TABLET ORAL at 06:15

## 2024-09-20 RX ADMIN — METOCLOPRAMIDE 5 MG: 5 TABLET ORAL at 11:09

## 2024-09-20 RX ADMIN — PANTOPRAZOLE SODIUM 40 MG: 40 TABLET, DELAYED RELEASE ORAL at 08:30

## 2024-09-20 RX ADMIN — PAROXETINE HYDROCHLORIDE 60 MG: 20 TABLET, FILM COATED ORAL at 08:30

## 2024-09-20 RX ADMIN — METOCLOPRAMIDE 5 MG: 5 TABLET ORAL at 06:15

## 2024-09-20 RX ADMIN — SUCRALFATE 1 G: 1 TABLET ORAL at 08:30

## 2024-09-20 RX ADMIN — SUCRALFATE 1 G: 1 TABLET ORAL at 11:09

## 2024-09-20 NOTE — PLAN OF CARE
Problem: Potential for Falls  Goal: Patient will remain free of falls  Description: INTERVENTIONS:  - Educate patient/family on patient safety including physical limitations  - Instruct patient to call for assistance with activity   - Consult OT/PT to assist with strengthening/mobility   - Keep Call bell within reach  - Keep bed low and locked with side rails adjusted as appropriate  - Keep care items and personal belongings within reach  - Initiate and maintain comfort rounds  - Make Fall Risk Sign visible to staff  - Offer Toileting every 2 Hours, in advance of need  - Obtain necessary fall risk management equipment  - Apply yellow socks and bracelet for high fall risk patients  - Consider moving patient to room near nurses station  Outcome: Progressing     Problem: Prexisting or High Potential for Compromised Skin Integrity  Goal: Skin integrity is maintained or improved  Description: INTERVENTIONS:  - Identify patients at risk for skin breakdown  - Assess and monitor skin integrity  - Assess and monitor nutrition and hydration status  - Monitor labs   - Assess for incontinence   - Turn and reposition patient  - Assist with mobility/ambulation  - Relieve pressure over bony prominences  - Avoid friction and shearing  - Provide appropriate hygiene as needed including keeping skin clean and dry  - Evaluate need for skin moisturizer/barrier cream  - Collaborate with interdisciplinary team   - Patient/family teaching  - Consider wound care consult   Outcome: Progressing     Problem: GASTROINTESTINAL - ADULT  Goal: Minimal or absence of nausea and/or vomiting  Description: INTERVENTIONS:  - Administer IV fluids if ordered to ensure adequate hydration  - Maintain NPO status until nausea and vomiting are resolved  - Nasogastric tube if ordered  - Administer ordered antiemetic medications as needed  - Provide nonpharmacologic comfort measures as appropriate  - Advance diet as tolerated, if ordered  - Consider nutrition  services referral to assist patient with adequate nutrition and appropriate food choices  Outcome: Progressing  Goal: Maintains or returns to baseline bowel function  Description: INTERVENTIONS:  - Assess bowel function  - Encourage oral fluids to ensure adequate hydration  - Administer IV fluids if ordered to ensure adequate hydration  - Administer ordered medications as needed  - Encourage mobilization and activity  - Consider nutritional services referral to assist patient with adequate nutrition and appropriate food choices  Outcome: Progressing  Goal: Maintains adequate nutritional intake  Description: INTERVENTIONS:  - Monitor percentage of each meal consumed  - Identify factors contributing to decreased intake, treat as appropriate  - Assist with meals as needed  - Monitor I&O, weight, and lab values if indicated  - Obtain nutrition services referral as needed  Outcome: Progressing  Goal: Oral mucous membranes remain intact  Description: INTERVENTIONS  - Assess oral mucosa and hygiene practices  - Implement preventative oral hygiene regimen  - Implement oral medicated treatments as ordered  - Initiate Nutrition services referral as needed  Outcome: Progressing     Problem: GENITOURINARY - ADULT  Goal: Maintains or returns to baseline urinary function  Description: INTERVENTIONS:  - Assess urinary function  - Encourage oral fluids to ensure adequate hydration if ordered  - Administer IV fluids as ordered to ensure adequate hydration  - Administer ordered medications as needed  - Offer frequent toileting  - Follow urinary retention protocol if ordered  Outcome: Progressing  Goal: Absence of urinary retention  Description: INTERVENTIONS:  - Assess patient’s ability to void and empty bladder  - Monitor I/O  - Bladder scan as needed  - Discuss with physician/AP medications to alleviate retention as needed  - Discuss catheterization for long term situations as appropriate  Outcome: Progressing     Problem:  METABOLIC, FLUID AND ELECTROLYTES - ADULT  Goal: Electrolytes maintained within normal limits  Description: INTERVENTIONS:  - Monitor labs and assess patient for signs and symptoms of electrolyte imbalances  - Administer electrolyte replacement as ordered  - Monitor response to electrolyte replacements, including repeat lab results as appropriate  - Instruct patient on fluid and nutrition as appropriate  Outcome: Progressing  Goal: Fluid balance maintained  Description: INTERVENTIONS:  - Monitor labs   - Monitor I/O and WT  - Instruct patient on fluid and nutrition as appropriate  - Assess for signs & symptoms of volume excess or deficit  Outcome: Progressing

## 2024-09-20 NOTE — PLAN OF CARE
Problem: Potential for Falls  Goal: Patient will remain free of falls  Description: INTERVENTIONS:  - Educate patient/family on patient safety including physical limitations  - Instruct patient to call for assistance with activity   - Consult OT/PT to assist with strengthening/mobility   - Keep Call bell within reach  - Keep bed low and locked with side rails adjusted as appropriate  - Keep care items and personal belongings within reach  - Initiate and maintain comfort rounds  - Make Fall Risk Sign visible to staff  - Offer Toileting every 2 Hours, in advance of need  - Initiate/Maintain bed/chair alarm  - Obtain necessary fall risk management equipment: nonskid footwear  - Apply yellow socks and bracelet for high fall risk patients  - Consider moving patient to room near nurses station  Outcome: Progressing     Problem: Prexisting or High Potential for Compromised Skin Integrity  Goal: Skin integrity is maintained or improved  Description: INTERVENTIONS:  - Identify patients at risk for skin breakdown  - Assess and monitor skin integrity  - Assess and monitor nutrition and hydration status  - Monitor labs   - Assess for incontinence   - Turn and reposition patient  - Assist with mobility/ambulation  - Relieve pressure over bony prominences  - Avoid friction and shearing  - Provide appropriate hygiene as needed including keeping skin clean and dry  - Evaluate need for skin moisturizer/barrier cream  - Collaborate with interdisciplinary team   - Patient/family teaching  - Consider wound care consult   Outcome: Progressing     Problem: GASTROINTESTINAL - ADULT  Goal: Minimal or absence of nausea and/or vomiting  Description: INTERVENTIONS:  - Administer IV fluids if ordered to ensure adequate hydration  - Maintain NPO status until nausea and vomiting are resolved  - Nasogastric tube if ordered  - Administer ordered antiemetic medications as needed  - Provide nonpharmacologic comfort measures as appropriate  -  Advance diet as tolerated, if ordered  - Consider nutrition services referral to assist patient with adequate nutrition and appropriate food choices  Outcome: Progressing  Goal: Maintains or returns to baseline bowel function  Description: INTERVENTIONS:  - Assess bowel function  - Encourage oral fluids to ensure adequate hydration  - Administer IV fluids if ordered to ensure adequate hydration  - Administer ordered medications as needed  - Encourage mobilization and activity  - Consider nutritional services referral to assist patient with adequate nutrition and appropriate food choices  Outcome: Progressing  Goal: Maintains adequate nutritional intake  Description: INTERVENTIONS:  - Monitor percentage of each meal consumed  - Identify factors contributing to decreased intake, treat as appropriate  - Assist with meals as needed  - Monitor I&O, weight, and lab values if indicated  - Obtain nutrition services referral as needed  Outcome: Progressing  Goal: Oral mucous membranes remain intact  Description: INTERVENTIONS  - Assess oral mucosa and hygiene practices  - Implement preventative oral hygiene regimen  - Implement oral medicated treatments as ordered  - Initiate Nutrition services referral as needed  Outcome: Progressing     Problem: GENITOURINARY - ADULT  Goal: Maintains or returns to baseline urinary function  Description: INTERVENTIONS:  - Assess urinary function  - Encourage oral fluids to ensure adequate hydration if ordered  - Administer IV fluids as ordered to ensure adequate hydration  - Administer ordered medications as needed  - Offer frequent toileting  - Follow urinary retention protocol if ordered  Outcome: Progressing  Goal: Absence of urinary retention  Description: INTERVENTIONS:  - Assess patient’s ability to void and empty bladder  - Monitor I/O  - Bladder scan as needed  - Discuss with physician/AP medications to alleviate retention as needed  - Discuss catheterization for long term  situations as appropriate  Outcome: Progressing     Problem: METABOLIC, FLUID AND ELECTROLYTES - ADULT  Goal: Electrolytes maintained within normal limits  Description: INTERVENTIONS:  - Monitor labs and assess patient for signs and symptoms of electrolyte imbalances  - Administer electrolyte replacement as ordered  - Monitor response to electrolyte replacements, including repeat lab results as appropriate  - Instruct patient on fluid and nutrition as appropriate  Outcome: Progressing  Goal: Fluid balance maintained  Description: INTERVENTIONS:  - Monitor labs   - Monitor I/O and WT  - Instruct patient on fluid and nutrition as appropriate  - Assess for signs & symptoms of volume excess or deficit  Outcome: Progressing

## 2024-09-20 NOTE — CASE MANAGEMENT
Case Management Discharge Planning Note    Patient name Irene Plascencia  Location /-01 MRN [id number]  : 1960 Date 2024       Current Admission Date: 2024  Current Admission Diagnosis:Aspiration pneumonia (HCC)   Patient Active Problem List    Diagnosis Date Noted Date Diagnosed    Leukopenia 2024     Aspiration pneumonia (HCC) 2024     Heartburn 2024     Gastroparesis 2024     Acute cough 2024     Elevated vitamin B12 level 2024     Iron deficiency 2024     Acquired absence of other toe(s), unspecified side (Formerly [facility]) 2024     Osteomyelitis, unspecified site, unspecified type ([facility]ly [facility]) 2024     Vitamin B12 deficiency (dietary) anemia 2023     Folate deficiency 2023     Neutropenia (Formerly [facility]) 2023     Post concussion syndrome 2023     Sepsis without acute organ dysfunction ([facility]ly [facility]) 2023     Acute respiratory failure with hypoxia ([facility]) 2023     Headache 2023     Nutritional anemia 2023     Stress incontinence 2023     Ulcer of toe, chronic, left, with unspecified severity ([facility]) 2023     Acute encephalopathy 2022     Thrombocytosis 2022     Abdominal pain 2022     Schreiber's esophagus 2022     Microcytic anemia 2022     Self neglect 2022     Pruritus 2022     Upper GI bleed 01/10/2022     Nausea and vomiting 10/22/2021     S/P tooth extraction 10/22/2021     SIRS (systemic inflammatory response syndrome) (Formerly [facility]) 2021     Hyperlipidemia 2021     Word finding difficulty 2021     Hiatal hernia with GERD without esophagitis 2021     Polyp of colon 2021     Melena 2020     Cellulitis of left upper extremity 2020     Erosive esophagitis 2020     Rash 2020     Iron deficiency anemia 2020     Multiple excoriations 2020     Fall 2020     Esophagitis 2020     Hyponatremia  01/27/2020     Problem related to living arrangement 01/27/2020     Hypokalemia      NMS (neuroleptic malignant syndrome) 01/03/2020     Preop exam for internal medicine 11/18/2019     Severe iron deficiency anemia  11/14/2019     Chronic bilateral low back pain without sciatica 11/01/2019     Right hip pain 07/15/2019     Primary hypertension 06/12/2019     Dermal hypersensitivity reaction 11/08/2018     Psychiatric disorder 08/21/2018     Schizoaffective disorder (HCC) 08/16/2018     Serotonin syndrome 08/13/2018     Other fatigue 06/19/2018     Spinal stenosis of lumbar region with neurogenic claudication 06/15/2018     Lumbar spondylosis 06/15/2018     T12 compression fracture (HCC) 06/15/2018     Synovial cyst of lumbar facet joint 06/15/2018     Localized osteoporosis with current pathological fracture with routine healing 05/25/2018     Lumbar radiculopathy 03/19/2018     DDD (degenerative disc disease), lumbar 03/19/2018     Spondylolisthesis at L4-L5 level 03/19/2018     Anxiety 10/31/2016     Acquired hypothyroidism 10/31/2016     Constipation 04/10/2014     Bulimia nervosa in remission 03/19/2014       LOS (days): 4  Geometric Mean LOS (GMLOS) (days): 4.6  Days to GMLOS:0.5     OBJECTIVE:  Risk of Unplanned Readmission Score: 19.2         Current admission status: Inpatient   Preferred Pharmacy:   [facility]/pharmacy #[id number] - [place], [place] - [address]  [address] [address]  Phone: [phone] Fax: [phone]    Primary Care Provider: Raheem Cain MD    Primary Insurance: [employer]  Secondary Insurance: [facility]    DISCHARGE DETAILS:    Discharge planning discussed with:: Patient  Freedom of Choice: Yes         Other Referral/Resources/Interventions Provided:  Interventions: Transportation  Referral Comments: Pt requesting assistance with transportation to home.  CM confirmed address with RN who spoke to pt.  YASMIN sent rideshare request for [phone]   from haroon SMITH CM to bedside.  Transportation waiver complete.         Treatment Team Recommendation: Home  Discharge Destination Plan:: Home  Transport at Discharge : Ride Share

## 2024-09-20 NOTE — DISCHARGE SUMMARY
Discharge Summary - Hospitalist   Name: Irene Plascencia 64 y.o. female I MRN: [id number]  Unit/Bed#: MS 46[facility]-01 I Date of Admission: 9/16/2024   Date of Service: 9/20/2024 I Hospital Day: 4     Assessment & Plan  Aspiration pneumonia (HCC)  Likely aspiration pneumonia from vomiting  Check COVID/RSV/Legionella/strep  Empiric antibiotic  Wean off oxygen as tolerated  Incentive spirometry  Aspiration precaution  Upper GI bleed  Patient with history of esophagitis  Taking NSAIDs although taking PPI and sucralfate  Presented with coffee-ground emesis for 1 week  Start IV PPI, monitor H&H every 8  GI consulted-need for repeat EGD/colonoscopy  GI recommended Reglan 5 mg IV 3 times daily before meals if QTc is normal.  Will obtain EKG.  Hold Geodon while on Reglan to avoid QTc prolongation  Acquired hypothyroidism  Continue Synthroid  Schizoaffective disorder (HCC)  .  PTA home meds  Esophagitis  On Protonix sucralfate  GI on board-May need repeat EGD  Hyponatremia  History of hyponatremia on salt tablets  Monitor sodium closely  No acute altered mental status  Leukopenia  Repeat CBC improved     Medical Problems       Resolved Problems  Date Reviewed: 9/16/2024   None       Discharging Physician / Practitioner: Kapil Daniels DO  PCP: Raheem Cain MD  Admission Date:   Admission Orders (From admission, onward)       Ordered        09/16/24 1101  Inpatient Admission  Once                          Discharge Date: 09/20/24    Consultations During Hospital Stay:  gi    Procedures Performed:   none          Reason for Admission: pna hematemesis    Hospital Course:   Irene Plascencia is a 64 y.o. female patient who originally presented to the hospital on 9/16/2024 due to a past medical history of gastroparesis, hiatal hernia, gastroesophageal reflux disease, Schreiber's esophagus who presents to the hospital with complaints of coffee-ground emesis with abdominal pain for 1 week.  Patient reportedly was taking increasing  dosing of NSAIDs.  She presents to the hospital for further work evaluation.  Fortunately her hemoglobin has been stable.  She was evaluated by GI with initial plans of monitoring hemoglobin before definitive intervention.  It was felt by GI that symptoms are consistent with esophagitis with NSAID use versus gastritis peptic ulcer orthophoria lesion.  Patient was transition to Protonix with home Carafate.  Patient has a follow-up with elective EGD which is scheduled on September 26 with GI.    Condition at Discharge: fair    Don to patient and family:   See after visit summary for information provided to patient and family.      Provisions for Follow-Up Care:  See after visit summary for information related to follow-up care and any pertinent home health orders.      Mobility at time of Discharge:   Basic Mobility Inpatient Raw Score: 20  JH-HLM Goal: 6: Walk 10 steps or more  JH-HLM Achieved: 7: Walk 25 feet or more  HLM Goal achieved. Continue to encourage appropriate mobility.     Disposition:   Home      Discharge Medications:  See after visit summary for reconciled discharge medications provided to patient and/or family.      Administrative Statements   Discharge Statement:  I have spent a total time of 85 minutes in caring for this patient on the day of the visit/encounter. >30 minutes of time was spent on: Impressions.    **Please Note: This note may have been constructed using a voice recognition system**

## 2024-09-20 NOTE — PROGRESS NOTES
Progress Note -     Name: Irene Plascencia 64 y.o. female I MRN: [id number]  Unit/Bed#: -01 I Date of Admission: 9/16/2024   Date of Service: 9/19/24 I Hospital Day: 4     This SmartSection is not supported in the current .     Pastoral Care Progress Note               09/20/24 0900   Clinical Encounter Type   Visited With Patient  (Father Karime)   Yarsanism Encounters   Yarsanism Needs   (Father Karime gave the patient a blessing.)

## 2024-09-21 DIAGNOSIS — F41.9 ANXIETY: ICD-10-CM

## 2024-09-21 DIAGNOSIS — K22.10 EROSIVE ESOPHAGITIS: ICD-10-CM

## 2024-09-21 RX ORDER — SUCRALFATE 1 G/1
1 TABLET ORAL
Qty: 270 TABLET | Refills: 1 | Status: ON HOLD | OUTPATIENT
Start: 2024-09-21

## 2024-09-21 RX ORDER — PAROXETINE 30 MG/1
60 TABLET, FILM COATED ORAL DAILY
Qty: 180 TABLET | Refills: 1 | Status: ON HOLD | OUTPATIENT
Start: 2024-09-21

## 2024-09-23 ENCOUNTER — TELEMEDICINE (OUTPATIENT)
Dept: INTERNAL MEDICINE CLINIC | Facility: CLINIC | Age: 64
End: 2024-09-23
Payer: COMMERCIAL

## 2024-09-23 ENCOUNTER — TRANSITIONAL CARE MANAGEMENT (OUTPATIENT)
Dept: INTERNAL MEDICINE CLINIC | Facility: CLINIC | Age: 64
End: 2024-09-23

## 2024-09-23 ENCOUNTER — TELEPHONE (OUTPATIENT)
Dept: GASTROENTEROLOGY | Facility: CLINIC | Age: 64
End: 2024-09-23

## 2024-09-23 DIAGNOSIS — Z13.9 ENCOUNTER FOR SCREENING INVOLVING SOCIAL DETERMINANTS OF HEALTH (SDOH): Primary | ICD-10-CM

## 2024-09-23 DIAGNOSIS — R11.2 NAUSEA AND VOMITING, UNSPECIFIED VOMITING TYPE: ICD-10-CM

## 2024-09-23 DIAGNOSIS — K31.84 GASTROPARESIS: ICD-10-CM

## 2024-09-23 DIAGNOSIS — E03.9 ACQUIRED HYPOTHYROIDISM: ICD-10-CM

## 2024-09-23 DIAGNOSIS — K92.2 UPPER GI BLEED: Primary | ICD-10-CM

## 2024-09-23 DIAGNOSIS — F41.9 ANXIETY: ICD-10-CM

## 2024-09-23 DIAGNOSIS — I10 PRIMARY HYPERTENSION: ICD-10-CM

## 2024-09-23 PROCEDURE — 99495 TRANSJ CARE MGMT MOD F2F 14D: CPT | Performed by: INTERNAL MEDICINE

## 2024-09-23 RX ORDER — LORAZEPAM 2 MG/1
2 TABLET ORAL EVERY 4 HOURS PRN
Qty: 180 TABLET | Refills: 1 | Status: ON HOLD | OUTPATIENT
Start: 2024-09-23

## 2024-09-23 RX ORDER — ONDANSETRON 4 MG/1
4 TABLET, FILM COATED ORAL EVERY 8 HOURS PRN
Qty: 30 TABLET | Refills: 5 | Status: ON HOLD | OUTPATIENT
Start: 2024-09-23

## 2024-09-23 RX ORDER — BUPROPION HYDROCHLORIDE 75 MG/1
75 TABLET ORAL 3 TIMES DAILY
Status: ON HOLD | COMMUNITY
Start: 2024-09-22

## 2024-09-23 NOTE — ASSESSMENT & PLAN NOTE
Continue Zofran as needed, and follow-up with GI    Orders:    ondansetron (ZOFRAN) 4 mg tablet; Take 1 tablet (4 mg total) by mouth every 8 (eight) hours as needed for nausea or vomiting

## 2024-09-23 NOTE — TELEPHONE ENCOUNTER
Pt cancelled EGD on 9/26/24 due to being recently discharged from hospital and still being sick.  Will call pt if do not hear back from her to try to reschedule.

## 2024-09-23 NOTE — ASSESSMENT & PLAN NOTE
Patient to follow-up with GI for EGD, no coffee-ground emesis since out of the hospital.  Continue proton pump inhibitor.

## 2024-09-23 NOTE — ASSESSMENT & PLAN NOTE
Orders:    LORazepam (ATIVAN) 2 mg tablet; Take 1 tablet (2 mg total) by mouth every 4 (four) hours as needed for anxiety

## 2024-09-23 NOTE — ASSESSMENT & PLAN NOTE
Patient was given Reglan while in the hospital, was not discharged on it, I recommend she check with GI about possibly restarting.  She reports she has stopped the Geodon which could have a potential interaction with the Reglan, but she is still on the Seroquel which can have an adverse drug interaction with the Reglan

## 2024-09-23 NOTE — PROGRESS NOTES
Virtual TCM Visit:    Verification of patient location:    Patient is located at Home in the following state in which I hold an active license [place]    Assessment & Plan  Upper GI bleed  Patient to follow-up with GI for EGD, no coffee-ground emesis since out of the hospital.  Continue proton pump inhibitor.         Gastroparesis  Patient was given Reglan while in the hospital, was not discharged on it, I recommend she check with GI about possibly restarting.  She reports she has stopped the Geodon which could have a potential interaction with the Reglan, but she is still on the Seroquel which can have an adverse drug interaction with the Reglan         Anxiety    Orders:    LORazepam (ATIVAN) 2 mg tablet; Take 1 tablet (2 mg total) by mouth every 4 (four) hours as needed for anxiety    Nausea and vomiting, unspecified vomiting type  Continue Zofran as needed, and follow-up with GI    Orders:    ondansetron (ZOFRAN) 4 mg tablet; Take 1 tablet (4 mg total) by mouth every 8 (eight) hours as needed for nausea or vomiting    Primary hypertension  Continue medication         Acquired hypothyroidism  Continue levothyroxine              Encounter provider Raheem Cain MD     Provider located at [facility]  [address] [place] [id number]    After connecting through [employer], the patient was identified by name and date of birth. Irene Plascencia was informed that this is a telemedicine visit and that the visit is being conducted through the Epic Embedded platform. She agrees to proceed..  My office door was closed. No one else was in the room.  She acknowledged consent and understanding of privacy and security of the video platform. The patient has agreed to participate and understands they can discontinue the visit at any time.    Patient is aware this is a billable service.    History of Present Illness     Transitional Care Management Review:   Irene Plascencia is a  64 y.o. female here for TCM follow up.    During the TCM phone call patient stated:  TCM Call       Date and time call was made  9/23/2024 10:35 AM    Hospital care reviewed  Records not available    Patient was hospitial[facility] at  [facility]    Date of Admission  09/16/24    Date of discharge  09/20/24    Diagnosis  Aspiration pneumonia (HCC)    Disposition  Home    Were the patients medications reviewed and updated  No    Current Symptoms  None    Dizziness severity  Severe; Moderate    Neausea severity  Mild    Fatigue severity  Severe    Quality Character  Lightheadedness    Episode pattern  Intermittent          TCM Call       Post hospital issues  None    Should patient be enrolled in anticoag monitoring?  No    Scheduled for follow up?  Yes    Not clinically warranted  Patient readmitted.    Patients specialists  Other (comment)    Other specialists names  John Araujo    Did you obtain your prescribed medications  Yes    Why were you unable to obtain your medications  need psych appt they will not get her in sooner than next week    Do you need help managing your prescriptions or medications  No    Is transportation to your appointment needed  No    I have advised the patient to call PCP with any new or worsening symptoms  Lori Rivera - /[place]    Are you recieving any outpatient services  No    Are you recieving home care services  No    Types of home care services  Nurse visit    Are you using any community resources  No    Current waiver services  No    Have you fallen in the last 12 months  Yes    How many times  1    Interperter language line needed  No    Counseling  Patient          I reviewed pt's hospitalization for aspiration pneumonia and coffee ground emesis.  No vomiting since out of hospital.  No fevers, no chills, no cough, no SOB.      Review of Systems   Constitutional:  Negative for chills and fever.   Respiratory:  Negative for cough and shortness of breath.    Cardiovascular:   Negative for chest pain.   Gastrointestinal:  Positive for nausea. Negative for blood in stool and vomiting.     Objective     There were no vitals taken for this visit.    Physical Exam  Constitutional:       General: She is not in acute distress.  Pulmonary:      Effort: Pulmonary effort is normal. No respiratory distress.   Neurological:      Mental Status: She is alert and oriented to person, place, and time.       Medications have been reviewed by provider in current encounter  Administrative Statements   I have spent a total time of 25 minutes in caring for this patient on the day of the visit/encounter including Risks and benefits of tx options, Instructions for management, Patient and family education, Impressions, Counseling / Coordination of care, Documenting in the medical record, Reviewing / ordering tests, medicine, procedures  , and Obtaining or reviewing history  .    Raheem Cain MD      VIRTUAL VISIT DISCLAIMER    Irene Plascencia verbally agrees to participate in Virtual Care Services. Pt is aware that Virtual Care Services could be limited without vital signs or the ability to perform a full hands-on physical exam. Irene Plascencia understands she or the provider may request at any time to terminate the video visit and request the patient to seek care or treatment in person.

## 2024-09-23 NOTE — PATIENT INSTRUCTIONS
Problem List Items Addressed This Visit          Cardiovascular and Mediastinum    Primary hypertension     Continue medication            Digestive    Nausea and vomiting     Continue Zofran as needed, and follow-up with GI         Relevant Medications    ondansetron (ZOFRAN) 4 mg tablet    Upper GI bleed - Primary     Patient to follow-up with GI for EGD, no coffee-ground emesis since out of the hospital.  Continue proton pump inhibitor.         Gastroparesis     Patient was given Reglan while in the hospital, was not discharged on it, I recommend she check with GI about possibly restarting.  She reports she has stopped the Geodon which could have a potential interaction with the Reglan, but she is still on the Seroquel which can have an adverse drug interaction with the Reglan            Endocrine    Acquired hypothyroidism     Continue levothyroxine            Behavioral Health    Anxiety    Relevant Medications    LORazepam (ATIVAN) 2 mg tablet

## 2024-09-24 ENCOUNTER — PATIENT OUTREACH (OUTPATIENT)
Dept: CASE MANAGEMENT | Facility: OTHER | Age: 64
End: 2024-09-24

## 2024-09-24 ENCOUNTER — TELEPHONE (OUTPATIENT)
Age: 64
End: 2024-09-24

## 2024-09-24 ENCOUNTER — TELEPHONE (OUTPATIENT)
Dept: GASTROENTEROLOGY | Facility: CLINIC | Age: 64
End: 2024-09-24

## 2024-09-24 DIAGNOSIS — K31.84 GASTROPARESIS: Primary | ICD-10-CM

## 2024-09-24 RX ORDER — METOCLOPRAMIDE 5 MG/1
5 TABLET ORAL
Qty: 90 TABLET | Refills: 1 | Status: ON HOLD | OUTPATIENT
Start: 2024-09-24 | End: 2024-10-01

## 2024-09-24 NOTE — PROGRESS NOTES
ASIM HOFFMAN received a referral [facility] report regarding possible concern with utilities. ASIM HOFFMAN attempted to contact patient at this time. LVM requesting a call in return at patient's earliest convenience.Will continue attempts to reach patient.

## 2024-09-24 NOTE — TELEPHONE ENCOUNTER
Patient returned call and stated she did not get script for the Reglan and did not show up on her discharge so she could not tell me the dose. Patient doing well on the medication. [facility] When sent we will notify patient. Thank you

## 2024-09-24 NOTE — TELEPHONE ENCOUNTER
Patients GI provider:  MECHE Amado    Number to return call: [phone]     Reason for call: Pt called and stated she was recently in the hospital and was giving medication Reglan and made her feel better and wanted to know if she is able to start taking medication please review and reach out to let her know thank you      Scheduled procedure/appointment date if applicable: n/a

## 2024-09-24 NOTE — UTILIZATION REVIEW
NOTIFICATION OF ADMISSION DISCHARGE   This is a Notification of Discharge from [facility]. Please be advised that this patient has been discharge from our facility. Below you will find the admission and discharge date and time including the patient’s disposition.   UTILIZATION REVIEW CONTACT:  Bonny Jones  Utilization   Network Utilization Review Department  Phone: [phone] x carefully listen to the prompts. All voicemails are confidential.  Email: [email or web address]     ADMISSION INFORMATION  PRESENTATION DATE: 9/16/2024  7:25 AM  OBERVATION ADMISSION DATE: N/A  INPATIENT ADMISSION DATE: 9/16/24 11:01 AM   DISCHARGE DATE: 9/20/2024  2:39 PM   DISPOSITION:Home/Self Care    Network Utilization Review Department  ATTENTION: Please call with any questions or concerns to [phone] and carefully listen to the prompts so that you are directed to the right person. All voicemails are confidential.   For Discharge needs, contact Care Management DC Support Team at [phone] opt. 2  Send all requests for admission clinical reviews, approved or denied determinations and any other requests to dedicated fax number below belonging to the campus where the patient is receiving treatment. List of dedicated fax numbers for the Facilities:  FACILITY NAME UR FAX NUMBER   ADMISSION DENIALS (Administrative/Medical Necessity) [phone]   DISCHARGE SUPPORT TEAM ([facility]) [phone]   PARENT CHILD HEALTH (Maternity/NICU/Pediatrics) [phone]   [facility] [phone]   [facility] [phone]   [facility] [phone]   [facility] [phone]   [facility] [phone]   [facility] [phone]   [facility] [phone]   [facility] [phone]    [facility] [phone]   [facility] [phone]   [facility] [phone]   [facility] [phone]

## 2024-09-24 NOTE — TELEPHONE ENCOUNTER
Pt returning a call to mio Hurd states Reglan is for Gastroparesis. Call was transferred to [place].

## 2024-09-26 ENCOUNTER — PATIENT OUTREACH (OUTPATIENT)
Dept: CASE MANAGEMENT | Facility: OTHER | Age: 64
End: 2024-09-26

## 2024-09-26 NOTE — PROGRESS NOTES
ASIM HOFFMAN received a referral from [facility] report re assistance with utilities.ASIM HOFFMAN has made multiple attempts to contact patient to assist, however, attempts to reach patient have been unsuccessful at this time. UTR letter sent. Referral will be closed, however, ASIM HOFFMAN will be available if needed.

## 2024-09-26 NOTE — LETTER
[address] [facility] [place] [phone] [phone]    Re: Unable to reach    9/26/2024       Dear Irene,    I hope this letter finds you well. I have been trying to reach out to you in hopes of being able to connect you with various community resources that can provide the support and assistance you may require. If you would like assistance connecting with appropriate community resources and discussing your concerns, please feel free to give me a call at [phone]. I look forward to hearing from you soon. Thank you.    Sincerely,         Rosangela Roa       None

## 2024-09-27 ENCOUNTER — APPOINTMENT (EMERGENCY)
Dept: RADIOLOGY | Facility: HOSPITAL | Age: 64
DRG: 178 | End: 2024-09-27
Payer: COMMERCIAL

## 2024-09-27 ENCOUNTER — HOSPITAL ENCOUNTER (INPATIENT)
Facility: HOSPITAL | Age: 64
LOS: 4 days | Discharge: HOME | DRG: 178 | End: 2024-10-01
Attending: EMERGENCY MEDICINE
Payer: COMMERCIAL

## 2024-09-27 DIAGNOSIS — R09.02 HYPOXIA: ICD-10-CM

## 2024-09-27 DIAGNOSIS — J18.9 PNEUMONIA: Primary | ICD-10-CM

## 2024-09-27 DIAGNOSIS — K31.84 GASTROPARESIS: ICD-10-CM

## 2024-09-27 PROBLEM — J69.0 ASPIRATION PNEUMONIA OF LEFT LOWER LOBE (HCC): Status: ACTIVE | Noted: 2020-01-02

## 2024-09-27 LAB
2HR DELTA HS TROPONIN: 0 NG/L
ALBUMIN SERPL BCG-MCNC: 4 G/DL (ref 3.5–5)
ALP SERPL-CCNC: 67 U/L (ref 34–104)
ALT SERPL W P-5'-P-CCNC: 12 U/L (ref 7–52)
ANION GAP SERPL CALCULATED.3IONS-SCNC: 9 MMOL/L (ref 4–13)
AST SERPL W P-5'-P-CCNC: 14 U/L (ref 13–39)
BASE EX.OXY STD BLDV CALC-SCNC: 95.5 % (ref 60–80)
BASE EXCESS BLDV CALC-SCNC: 3.6 MMOL/L
BASOPHILS # BLD AUTO: 0.02 THOUSANDS/ΜL (ref 0–0.1)
BASOPHILS NFR BLD AUTO: 0 % (ref 0–1)
BILIRUB SERPL-MCNC: 0.38 MG/DL (ref 0.2–1)
BNP SERPL-MCNC: 14 PG/ML (ref 0–100)
BUN SERPL-MCNC: 4 MG/DL (ref 5–25)
CALCIUM SERPL-MCNC: 9.4 MG/DL (ref 8.4–10.2)
CARDIAC TROPONIN I PNL SERPL HS: 3 NG/L
CARDIAC TROPONIN I PNL SERPL HS: 3 NG/L
CHLORIDE SERPL-SCNC: 97 MMOL/L (ref 96–108)
CO2 SERPL-SCNC: 28 MMOL/L (ref 21–32)
CREAT SERPL-MCNC: 0.58 MG/DL (ref 0.6–1.3)
EOSINOPHIL # BLD AUTO: 0.16 THOUSAND/ΜL (ref 0–0.61)
EOSINOPHIL NFR BLD AUTO: 2 % (ref 0–6)
ERYTHROCYTE [DISTWIDTH] IN BLOOD BY AUTOMATED COUNT: 15.7 % (ref 11.6–15.1)
FLUAV RNA RESP QL NAA+PROBE: NEGATIVE
FLUBV RNA RESP QL NAA+PROBE: NEGATIVE
GFR SERPL CREATININE-BSD FRML MDRD: 97 ML/MIN/1.73SQ M
GLUCOSE SERPL-MCNC: 89 MG/DL (ref 65–140)
HCO3 BLDV-SCNC: 28.8 MMOL/L (ref 24–30)
HCT VFR BLD AUTO: 34.4 % (ref 34.8–46.1)
HGB BLD-MCNC: 11.5 G/DL (ref 11.5–15.4)
IMM GRANULOCYTES # BLD AUTO: 0.03 THOUSAND/UL (ref 0–0.2)
IMM GRANULOCYTES NFR BLD AUTO: 0 % (ref 0–2)
LYMPHOCYTES # BLD AUTO: 0.38 THOUSANDS/ΜL (ref 0.6–4.47)
LYMPHOCYTES NFR BLD AUTO: 4 % (ref 14–44)
MCH RBC QN AUTO: 28.2 PG (ref 26.8–34.3)
MCHC RBC AUTO-ENTMCNC: 33.4 G/DL (ref 31.4–37.4)
MCV RBC AUTO: 84 FL (ref 82–98)
MONOCYTES # BLD AUTO: 0.32 THOUSAND/ΜL (ref 0.17–1.22)
MONOCYTES NFR BLD AUTO: 3 % (ref 4–12)
NEUTROPHILS # BLD AUTO: 9.22 THOUSANDS/ΜL (ref 1.85–7.62)
NEUTS SEG NFR BLD AUTO: 91 % (ref 43–75)
NRBC BLD AUTO-RTO: 0 /100 WBCS
O2 CT BLDV-SCNC: 16.3 ML/DL
PCO2 BLDV: 45.8 MM HG (ref 42–50)
PH BLDV: 7.42 [PH] (ref 7.3–7.4)
PLATELET # BLD AUTO: 256 THOUSANDS/UL (ref 149–390)
PMV BLD AUTO: 8.3 FL (ref 8.9–12.7)
PO2 BLDV: 91.3 MM HG (ref 35–45)
POTASSIUM SERPL-SCNC: 3.3 MMOL/L (ref 3.5–5.3)
PROCALCITONIN SERPL-MCNC: <0.05 NG/ML
PROT SERPL-MCNC: 6.3 G/DL (ref 6.4–8.4)
RBC # BLD AUTO: 4.08 MILLION/UL (ref 3.81–5.12)
RSV RNA RESP QL NAA+PROBE: NEGATIVE
SARS-COV-2 RNA RESP QL NAA+PROBE: NEGATIVE
SODIUM SERPL-SCNC: 134 MMOL/L (ref 135–147)
WBC # BLD AUTO: 10.13 THOUSAND/UL (ref 4.31–10.16)

## 2024-09-27 PROCEDURE — 94664 DEMO&/EVAL PT USE INHALER: CPT

## 2024-09-27 PROCEDURE — 84484 ASSAY OF TROPONIN QUANT: CPT

## 2024-09-27 PROCEDURE — 92610 EVALUATE SWALLOWING FUNCTION: CPT

## 2024-09-27 PROCEDURE — 80053 COMPREHEN METABOLIC PANEL: CPT

## 2024-09-27 PROCEDURE — 99223 1ST HOSP IP/OBS HIGH 75: CPT

## 2024-09-27 PROCEDURE — 83880 ASSAY OF NATRIURETIC PEPTIDE: CPT

## 2024-09-27 PROCEDURE — 84145 PROCALCITONIN (PCT): CPT

## 2024-09-27 PROCEDURE — 82805 BLOOD GASES W/O2 SATURATION: CPT | Performed by: EMERGENCY MEDICINE

## 2024-09-27 PROCEDURE — 99285 EMERGENCY DEPT VISIT HI MDM: CPT | Performed by: EMERGENCY MEDICINE

## 2024-09-27 PROCEDURE — 0241U HB NFCT DS VIR RESP RNA 4 TRGT: CPT

## 2024-09-27 PROCEDURE — 99285 EMERGENCY DEPT VISIT HI MDM: CPT

## 2024-09-27 PROCEDURE — 85025 COMPLETE CBC W/AUTO DIFF WBC: CPT

## 2024-09-27 PROCEDURE — 36415 COLL VENOUS BLD VENIPUNCTURE: CPT

## 2024-09-27 PROCEDURE — 94668 MNPJ CHEST WALL SBSQ: CPT

## 2024-09-27 PROCEDURE — 93005 ELECTROCARDIOGRAM TRACING: CPT

## 2024-09-27 PROCEDURE — 71045 X-RAY EXAM CHEST 1 VIEW: CPT

## 2024-09-27 RX ORDER — BUPROPION HYDROCHLORIDE 75 MG/1
75 TABLET ORAL 3 TIMES DAILY
Status: DISCONTINUED | OUTPATIENT
Start: 2024-09-27 | End: 2024-10-01 | Stop reason: HOSPADM

## 2024-09-27 RX ORDER — ACETAMINOPHEN 325 MG/1
650 TABLET ORAL EVERY 6 HOURS PRN
Status: DISCONTINUED | OUTPATIENT
Start: 2024-09-27 | End: 2024-10-01 | Stop reason: HOSPADM

## 2024-09-27 RX ORDER — ALBUTEROL SULFATE 0.83 MG/ML
2.5 SOLUTION RESPIRATORY (INHALATION) EVERY 4 HOURS PRN
Status: DISCONTINUED | OUTPATIENT
Start: 2024-09-27 | End: 2024-09-29

## 2024-09-27 RX ORDER — BENZONATATE 100 MG/1
100 CAPSULE ORAL 3 TIMES DAILY PRN
Status: DISCONTINUED | OUTPATIENT
Start: 2024-09-27 | End: 2024-10-01 | Stop reason: HOSPADM

## 2024-09-27 RX ORDER — ENOXAPARIN SODIUM 100 MG/ML
40 INJECTION SUBCUTANEOUS DAILY
Status: DISCONTINUED | OUTPATIENT
Start: 2024-09-27 | End: 2024-10-01 | Stop reason: HOSPADM

## 2024-09-27 RX ORDER — PANTOPRAZOLE SODIUM 40 MG/1
40 TABLET, DELAYED RELEASE ORAL 2 TIMES DAILY
Status: DISCONTINUED | OUTPATIENT
Start: 2024-09-27 | End: 2024-10-01 | Stop reason: HOSPADM

## 2024-09-27 RX ORDER — METOCLOPRAMIDE 5 MG/1
5 TABLET ORAL
Status: DISCONTINUED | OUTPATIENT
Start: 2024-09-27 | End: 2024-09-28

## 2024-09-27 RX ORDER — LEVOTHYROXINE SODIUM 25 UG/1
25 TABLET ORAL
Status: DISCONTINUED | OUTPATIENT
Start: 2024-09-28 | End: 2024-10-01 | Stop reason: HOSPADM

## 2024-09-27 RX ORDER — QUETIAPINE 200 MG/1
400 TABLET, FILM COATED, EXTENDED RELEASE ORAL
Status: DISCONTINUED | OUTPATIENT
Start: 2024-09-27 | End: 2024-10-01 | Stop reason: HOSPADM

## 2024-09-27 RX ORDER — ZIPRASIDONE HYDROCHLORIDE 40 MG/1
80 CAPSULE ORAL DAILY
Status: DISCONTINUED | OUTPATIENT
Start: 2024-09-27 | End: 2024-10-01 | Stop reason: HOSPADM

## 2024-09-27 RX ORDER — LORAZEPAM 1 MG/1
2 TABLET ORAL EVERY 4 HOURS PRN
Status: DISCONTINUED | OUTPATIENT
Start: 2024-09-27 | End: 2024-10-01 | Stop reason: HOSPADM

## 2024-09-27 RX ORDER — PAROXETINE 20 MG/1
60 TABLET, FILM COATED ORAL DAILY
Status: DISCONTINUED | OUTPATIENT
Start: 2024-09-28 | End: 2024-10-01 | Stop reason: HOSPADM

## 2024-09-27 RX ORDER — GUAIFENESIN 600 MG/1
600 TABLET, EXTENDED RELEASE ORAL EVERY 12 HOURS SCHEDULED
Status: DISCONTINUED | OUTPATIENT
Start: 2024-09-27 | End: 2024-10-01 | Stop reason: HOSPADM

## 2024-09-27 RX ORDER — SUCRALFATE 1 G/1
1 TABLET ORAL
Status: DISCONTINUED | OUTPATIENT
Start: 2024-09-27 | End: 2024-10-01 | Stop reason: HOSPADM

## 2024-09-27 RX ORDER — AMLODIPINE BESYLATE 5 MG/1
5 TABLET ORAL DAILY
Status: DISCONTINUED | OUTPATIENT
Start: 2024-09-28 | End: 2024-10-01 | Stop reason: HOSPADM

## 2024-09-27 RX ORDER — DROPERIDOL 2.5 MG/ML
1 INJECTION, SOLUTION INTRAMUSCULAR; INTRAVENOUS ONCE
Status: COMPLETED | OUTPATIENT
Start: 2024-09-27 | End: 2024-09-27

## 2024-09-27 RX ORDER — POTASSIUM CHLORIDE 1500 MG/1
40 TABLET, EXTENDED RELEASE ORAL ONCE
Status: COMPLETED | OUTPATIENT
Start: 2024-09-27 | End: 2024-09-27

## 2024-09-27 RX ORDER — SODIUM CHLORIDE 9 MG/ML
125 INJECTION, SOLUTION INTRAVENOUS CONTINUOUS
Status: DISCONTINUED | OUTPATIENT
Start: 2024-09-27 | End: 2024-09-28

## 2024-09-27 RX ADMIN — SUCRALFATE 1 G: 1 TABLET ORAL at 15:41

## 2024-09-27 RX ADMIN — GUAIFENESIN 600 MG: 600 TABLET, EXTENDED RELEASE ORAL at 20:33

## 2024-09-27 RX ADMIN — BUPROPION HYDROCHLORIDE 75 MG: 75 TABLET, FILM COATED ORAL at 21:55

## 2024-09-27 RX ADMIN — CEFEPIME 2000 MG: 2 INJECTION, POWDER, FOR SOLUTION INTRAVENOUS at 14:05

## 2024-09-27 RX ADMIN — BUPROPION HYDROCHLORIDE 75 MG: 75 TABLET, FILM COATED ORAL at 15:41

## 2024-09-27 RX ADMIN — QUETIAPINE FUMARATE 400 MG: 200 TABLET, EXTENDED RELEASE ORAL at 21:55

## 2024-09-27 RX ADMIN — METOCLOPRAMIDE 5 MG: 5 TABLET ORAL at 15:41

## 2024-09-27 RX ADMIN — POTASSIUM CHLORIDE 40 MEQ: 1500 TABLET, EXTENDED RELEASE ORAL at 13:26

## 2024-09-27 RX ADMIN — GUAIFENESIN 600 MG: 600 TABLET, EXTENDED RELEASE ORAL at 14:58

## 2024-09-27 RX ADMIN — SODIUM CHLORIDE 125 ML/HR: 0.9 INJECTION, SOLUTION INTRAVENOUS at 14:58

## 2024-09-27 RX ADMIN — PANTOPRAZOLE SODIUM 40 MG: 40 TABLET, DELAYED RELEASE ORAL at 18:06

## 2024-09-27 NOTE — RESPIRATORY THERAPY NOTE
RT Protocol Note  Irene Plascencia 64 y.o. female MRN: [id number]  Unit/Bed#: ED 17 Encounter: [id number]    Assessment    Principal Problem:    Aspiration pneumonia of left lower lobe (HCC)  Active Problems:    Acquired hypothyroidism    Primary hypertension    Severe iron deficiency anemia     Schreiber's esophagus      Home Pulmonary Medications:  None       Past Medical History:   Diagnosis Date    Anemia     Anxiety     Arthritis     Back pain at L4-L5 level     Schreiber esophagus     Bulimia     Colon polyp     Disease of thyroid gland     hypothyroid    GERD (gastroesophageal reflux disease)     Hearing loss in left ear     History of transfusion     Hyperlipidemia     Hypertension     Hypothyroidism     Leukopenia     NMS (neuroleptic malignant syndrome) 01/03/2020    Pneumonia     RA (rheumatoid arthritis) (HCC)     in feet    Sciatica     Seizure (HCC)     due to medication mix up 8/2018 only one historically    Skin spots, red     lower right arm - poss from cat- no s/s infection    Synovial cyst of lumbar spine     Vertigo     Wears dentures     full set    Weight gain      Social History     Socioeconomic History    Marital status: Single     Spouse name: None    Number of children: None    Years of education: None    Highest education level: None   Occupational History    None   Tobacco Use    Smoking status: Never     Passive exposure: Past    Smokeless tobacco: Never   Vaping Use    Vaping status: Never Used   Substance and Sexual Activity    Alcohol use: Not Currently    Drug use: Not Currently    Sexual activity: Not Currently   Other Topics Concern    None   Social History Narrative    Family disruption death of family member parent, per allscripts     Social Determinants of Health     Financial Resource Strain: Medium Risk (1/4/2023)    Overall Financial Resource Strain (CARDIA)     Difficulty of Paying Living Expenses: Somewhat hard   Food Insecurity: No Food Insecurity (9/17/2024)    Hunger  Vital Sign     Worried About Running Out of Food in the Last Year: Never true     Ran Out of Food in the Last Year: Never true   Transportation Needs: No Transportation Needs (9/17/2024)    PRAPARE - Transportation     Lack of Transportation (Medical): No     Lack of Transportation (Non-Medical): No   Physical Activity: Not on file   Stress: Stress Concern Present (5/18/2021)    [employer] [place] [employer] - Occupational Stress Questionnaire     Feeling of Stress : To some extent   Social Connections: Not on file   Intimate Partner Violence: Not on file   Housing Stability: Low Risk  (9/17/2024)    Housing Stability Vital Sign     Unable to Pay for Housing in the Last Year: No     Number of Times Moved in the Last Year: 0     Homeless in the Last Year: No       Subjective         Objective    Physical Exam:   Assessment Type: Assess only  General Appearance: Drowsy, Lethargic, Sleeping  Respiratory Pattern: Normal  Chest Assessment: Chest expansion symmetrical  Bilateral Breath Sounds: Diminished, Rhonchi  Cough: Non-productive    Vitals:  Blood pressure 120/67, pulse 74, temperature 98.2 °F (36.8 °C), temperature source Oral, resp. rate 15, SpO2 95%, not currently breastfeeding.          Imaging and other studies: Reviewed radiology reports from this admission including: chest xray.          Plan    Respiratory Plan: Mild Distress pathway  Airway Clearance Plan: Incentive Spirometer, Flutter     Resp Comments: Pt in hospital for issues with aspiration pneumonia, pt was in hospital with this a few weeks ago. Pt denies any pulm history or taking any pulm meds at home. Pt states that she feels SOB, however she was sleeping when I entered room and does not appear tachypneic or very labored. Pt bs diminished with rhonchi, gave pt flutter and IS. Will order pt flutter tid and prn neb, will also order acp.

## 2024-09-27 NOTE — ED ATTENDING ATTESTATION
9/27/2024  I, Dionisio Gomez MD, saw and evaluated the patient. I have discussed the patient with the resident/non-physician practitioner and agree with the resident's/non-physician practitioner's findings, Plan of Care, and MDM as documented in the resident's/non-physician practitioner's note, except where noted. All available labs and Radiology studies were reviewed.  I was present for key portions of any procedure(s) performed by the resident/non-physician practitioner and I was immediately available to provide assistance.       At this point I agree with the current assessment done in the Emergency Department.  I have conducted an independent evaluation of this patient a history and physical is as follows:    ED Course  ED Course as of 09/27/24 1123   Fri Sep 27, 2024   1048 Per resident h&p 65 YO F recent admission for pneumonia presents for dyspnea nasal congestion NV cough with yellow mucus; dizziness more lightheadedness; chest tightness when she is coughing; O: RRR CTA bilateral; abd soft NT; no edema; I/P Tarry emesis; on a ppi; was recommended to get out patient GI consult and EGD; CXR WBC      Emergency Department Note- Irene Plascencia 64 y.o. female MRN: [id number]    Unit/Bed#: ED 17 Encounter: [id number]    Irene Plascencia is a 64 y.o. female who presents with   Chief Complaint   Patient presents with    Shortness of Breath     Per EMS patient recently discharged with aspiration pneumonia and finished her course of oral ABX, patient reports increasing SOB since. Patient also reports dizziness and nausea for the past 2 days.         History of Present Illness   HPI:  Irene Plascencia is a 64 y.o. female who presents for evaluation of:  Recurrent dyspnea.  Patient was recently discharged after an admission for aspiration pneumonia with hypoxia.  Patient finished her antibiotics on Sunday.  She has noticed a recrudescence of her dyspnea since Sunday and now is dyspneic at rest.  Any sort  of activity provokes her dyspnea.  Typically she is more dyspneic at night and a little bit better during the day.  She has a cough that is nonproductive of sputum; she denies any sort of pleuritic chest pain and hemoptysis.  She denies smoking.      Review of Systems   Constitutional:  Negative for fatigue and fever.   HENT:  Negative for congestion and sore throat.    Respiratory:  Positive for shortness of breath. Negative for cough.    Cardiovascular:  Negative for chest pain and palpitations.   Gastrointestinal:  Negative for abdominal pain and nausea.   Genitourinary:  Negative for flank pain and frequency.   Neurological:  Negative for light-headedness and headaches.   Psychiatric/Behavioral:  Negative for dysphoric mood and hallucinations.    All other systems reviewed and are negative.      Historical Information   Past Medical History:   Diagnosis Date    Anemia     Anxiety     Arthritis     Back pain at L4-L5 level     Schreiber esophagus     Bulimia     Colon polyp     Disease of thyroid gland     hypothyroid    GERD (gastroesophageal reflux disease)     Hearing loss in left ear     History of transfusion     Hyperlipidemia     Hypertension     Hypothyroidism     Leukopenia     NMS (neuroleptic malignant syndrome) 01/03/2020    Pneumonia     RA (rheumatoid arthritis) (HCC)     in feet    Sciatica     Seizure (HCC)     due to medication mix up 8/2018 only one historically    Skin spots, red     lower right arm - poss from cat- no s/s infection    Synovial cyst of lumbar spine     Vertigo     Wears dentures     full set    Weight gain      Past Surgical History:   Procedure Laterality Date    BONE MARROW BIOPSY      BREAST SURGERY      enlargement with implants    CLOSED REDUCTION DISTAL FEMUR FRACTURE      COLONOSCOPY      COSMETIC SURGERY      DENTAL SURGERY      HIP ARTHROPLASTY Right 01/02/2020    Procedure: REVISION TOTAL HIP ARTHROPLASTY WITH REPAIR OF PARIPROSTHETIC FRACTURE - POSTERIOR APPROACH;   Surgeon: Dilan Pedersen MD;  [facility];  Service: Orthopedics    CT AMPUTATION METATARSAL W/TOE SINGLE Left 04/07/2023    Procedure: AMPUTATION TOE 4th;  Surgeon: Conner Bartlett DPM;  Location:  MAIN OR;  Service: Podiatry    CT ARTHRP ACETBLR/PROX FEM PROSTC AGRFT/ALGRFT Right 12/11/2019    Procedure: ARTHROPLASTY HIP TOTAL ANTERIOR;  Surgeon: Dilan Pedersen MD;  [facility];  Service: Orthopedics    CT ESOPHAGOGASTRODUODENOSCOPY TRANSORAL DIAGNOSTIC N/A 05/11/2021    Procedure: ESOPHAGOGASTRODUODENOSCOPY (EGD);  Surgeon: David Cedeño MD;  [facility];  Service: General    CT LAPS RPR PARAESPHGL HRNA INCL FUNDPLSTY W/MESH N/A 05/11/2021    Procedure: REPAIR HERNIA PARAESOPHAGEAL  LAPAROSCOPIC;  Surgeon: Dvaid Cedeño MD;  [facility];  Service: General    CT UNLISTED PROCEDURE ESOPHAGUS N/A 05/11/2021    Procedure: FUNDOPLICATION TRANSORAL INCISIONLESS;  Surgeon: Brad Cadet MD;  [facility];  Service: Gastroenterology    RHINOPLASTY      SINUS SURGERY      TOE AMPUTATION Left     2023 4th toe    TRANSORAL INCISIONLESS FUNDOPLICATION (TIF)  05/11/2021     Social History   Social History     Substance and Sexual Activity   Alcohol Use Not Currently     Social History     Substance and Sexual Activity   Drug Use Not Currently     Social History     Tobacco Use   Smoking Status Never    Passive exposure: Past   Smokeless Tobacco Never     Family History:   Family History   Problem Relation Age of Onset    Uterine cancer Mother     Esophageal cancer Father     Colon cancer Maternal Grandmother        Meds/Allergies   PTA meds:   Prior to Admission Medications   Prescriptions Last Dose Informant Patient Reported? Taking?   LORazepam (ATIVAN) 2 mg tablet   No No   Sig: Take 1 tablet (2 mg total) by mouth every 4 (four) hours as needed for anxiety   PARoxetine (PAXIL) 30 mg tablet   No No   Sig: TAKE 2 TABLETS (60 MG TOTAL) BY MOUTH IN THE MORNING   QUEtiapine  (SEROquel XR) 400 mg 24 hr tablet  Self No No   Sig: TAKE 1 TABLET (400 MG TOTAL) BY MOUTH DAILY AT BEDTIME   amLODIPine (NORVASC) 5 mg tablet  Self No No   Sig: TAKE 1 TABLET (5 MG TOTAL) BY MOUTH DAILY.   buPROPion (WELLBUTRIN) 75 mg tablet   Yes No   Sig: Take 75 mg by mouth 3 (three) times a day   levothyroxine 25 mcg tablet  Self No No   Sig: TAKE 1 TABLET BY MOUTH EVERY DAY   metoclopramide (REGLAN) 5 mg tablet   No No   Sig: Take 1 tablet (5 mg total) by mouth 3 (three) times a day before meals   ondansetron (ZOFRAN) 4 mg tablet   No No   Sig: Take 1 tablet (4 mg total) by mouth every 8 (eight) hours as needed for nausea or vomiting   pantoprazole (PROTONIX) 40 mg tablet   No No   Sig: TAKE 1 TABLET BY MOUTH TWICE A DAY   sodium chloride 1 g tablet  Self No No   Sig: Take 2 tablets (2 g total) by mouth 2 (two) times a day   sucralfate (CARAFATE) 1 g tablet   No No   Sig: TAKE 1 TABLET (1 G TOTAL) BY MOUTH 3 (THREE) TIMES A DAY WITH MEALS   ziprasidone (GEODON) 80 mg capsule  Self No No   Sig: TAKE 1 CAPSULE BY MOUTH EVERY DAY      Facility-Administered Medications: None     Allergies   Allergen Reactions    Latex Anaphylaxis, Rash and Tongue Swelling     Band aids, adhesives wears normal underwear, can eat bananas  USE PAPER TAPE    Risperdal [Risperidone] Shortness Of Breath     Rapid heart beat, SOB    Zyprexa [Olanzapine] Shortness Of Breath     Rapid heartbeat       Objective   First Vitals:   Blood Pressure: 142/79 (09/27/24 1012)  Pulse: 91 (09/27/24 1012)  Temperature: 98.2 °F (36.8 °C) (09/27/24 1012)  Temp Source: Oral (09/27/24 1012)  Respirations: 18 (09/27/24 1012)  SpO2: 94 % (09/27/24 1012)    Current Vitals:   Blood Pressure: 142/79 (09/27/24 1012)  Pulse: 91 (09/27/24 1012)  Temperature: 98.2 °F (36.8 °C) (09/27/24 1012)  Temp Source: Oral (09/27/24 1012)  Respirations: 18 (09/27/24 1012)  SpO2: 94 % (09/27/24 1012)    No intake or output data in the 24 hours ending 09/27/24 1124    Invasive  Devices       None                   Physical Exam  Vitals and nursing note reviewed.   Constitutional:       General: She is not in acute distress.     Appearance: Normal appearance. She is well-developed.   HENT:      Head: Normocephalic and atraumatic.      Right Ear: External ear normal.      Left Ear: External ear normal.      Nose: Nose normal.      Mouth/Throat:      Pharynx: No oropharyngeal exudate.   Eyes:      Conjunctiva/sclera: Conjunctivae normal.      Pupils: Pupils are equal, round, and reactive to light.   Cardiovascular:      Rate and Rhythm: Normal rate and regular rhythm.   Pulmonary:      Effort: Pulmonary effort is normal. No respiratory distress.      Breath sounds: Examination of the right-lower field reveals rales. Rales present.   Abdominal:      General: Abdomen is flat. There is no distension.      Palpations: Abdomen is soft.   Musculoskeletal:         General: No deformity. Normal range of motion.      Cervical back: Normal range of motion and neck supple.   Skin:     General: Skin is warm and dry.      Capillary Refill: Capillary refill takes less than 2 seconds.   Neurological:      General: No focal deficit present.      Mental Status: She is alert and oriented to person, place, and time. Mental status is at baseline.      Coordination: Coordination normal.   Psychiatric:         Mood and Affect: Mood normal.         Behavior: Behavior normal.         Thought Content: Thought content normal.         Judgment: Judgment normal.           Medical Decision Makin.  Acute dyspnea with hypoxia noted on exam and rales in her right lung base; CBC rule out leukocytosis and anemia; complete metabolic profile rule out electrolyte disturbance, uremia, and disorders of glucose homeostasis; VBG rule out respiratory failure; chest x-ray rule out worsening pneumonia; BNP rule out congestive heart failure.    No results found for this or any previous visit (from the past 36 hour(s)).  No orders  "to display         Portions of the record may have been created with voice recognition software. Occasional wrong word or \"sound a like\" substitutions may have occurred due to the inherent limitations of voice recognition software.  Read the chart carefully and recognize, using context, where substitutions have occurred.        Critical Care Time  Procedures      "

## 2024-09-27 NOTE — ASSESSMENT & PLAN NOTE
Patient recently admitted on 9/16-9/20 for aspiration pneumonia.  Completed course of antibiotics and sent home with 3 days of Keflex  Patient had improvement however had a relapse with increased cough, increased shortness of breath  No fever no chills  Procal negative  Denies orthopnea.  Does not appear markedly overloaded  Patient appears dry  Patient denies however patient may have had another aspiration  Xray shows improved consolidation on the left, increased opacities on the rught  Plan  Speech eval. Last eval in 2022 recommends  soft/level 3 diet and thin liquids . Will resume this for now  Cefipime   Sputum cultures  Normal saline  BNP pending  COVID Flu   rash started a few days ago. with increasing on body. dry flat, no pustules. no known allergies. utd on vaccinations

## 2024-09-27 NOTE — SPEECH THERAPY NOTE
Speech Language/Pathology  Speech-Language Pathology Bedside Swallow Evaluation      Patient Name: Irene Plascencia    Today's Date: 9/27/2024     Problem List  Principal Problem:    Aspiration pneumonia of left lower lobe (HCC)  Active Problems:    Acquired hypothyroidism    Primary hypertension    Severe iron deficiency anemia     Schreiber's esophagus      Past Medical History  Past Medical History:   Diagnosis Date    Anemia     Anxiety     Arthritis     Back pain at L4-L5 level     Schreiber esophagus     Bulimia     Colon polyp     Disease of thyroid gland     hypothyroid    GERD (gastroesophageal reflux disease)     Hearing loss in left ear     History of transfusion     Hyperlipidemia     Hypertension     Hypothyroidism     Leukopenia     NMS (neuroleptic malignant syndrome) 01/03/2020    Pneumonia     RA (rheumatoid arthritis) (HCC)     in feet    Sciatica     Seizure (HCC)     due to medication mix up 8/2018 only one historically    Skin spots, red     lower right arm - poss from cat- no s/s infection    Synovial cyst of lumbar spine     Vertigo     Wears dentures     full set    Weight gain        Past Surgical History  Past Surgical History:   Procedure Laterality Date    BONE MARROW BIOPSY      BREAST SURGERY      enlargement with implants    CLOSED REDUCTION DISTAL FEMUR FRACTURE      COLONOSCOPY      COSMETIC SURGERY      DENTAL SURGERY      HIP ARTHROPLASTY Right 01/02/2020    Procedure: REVISION TOTAL HIP ARTHROPLASTY WITH REPAIR OF PARIPROSTHETIC FRACTURE - POSTERIOR APPROACH;  Surgeon: Dilan Pedersen MD;  [facility];  Service: Orthopedics    [place] AMPUTATION METATARSAL W/TOE SINGLE Left 04/07/2023    Procedure: AMPUTATION TOE 4th;  Surgeon: Conner Bartlett DPM;  Location:  [facility];  Service: Podiatry    [place] ARTHRP ACETBLR/PROX FEM PROSTC AGRFT/ALGRFT Right 12/11/2019    Procedure: ARTHROPLASTY HIP TOTAL ANTERIOR;  Surgeon: Dilan Pedersen MD;  Location:  [facility];  Service: Orthopedics     [place] ESOPHAGOGASTRODUODENOSCOPY TRANSORAL DIAGNOSTIC N/A 05/11/2021    Procedure: ESOPHAGOGASTRODUODENOSCOPY (EGD);  Surgeon: David Cedeño MD;  [facility];  Service: General    TX LAPS RPR PARAESPHGL HRNA INCL FUNDPLSTY W/MESH N/A 05/11/2021    Procedure: REPAIR HERNIA PARAESOPHAGEAL  LAPAROSCOPIC;  Surgeon: David Cedeño MD;  [facility];  Service: General    TX UNLISTED PROCEDURE ESOPHAGUS N/A 05/11/2021    Procedure: FUNDOPLICATION TRANSORAL INCISIONLESS;  Surgeon: Brad Cadet MD;  [facility];  Service: Gastroenterology    RHINOPLASTY      SINUS SURGERY      TOE AMPUTATION Left     2023 4th toe    TRANSORAL INCISIONLESS FUNDOPLICATION (TIF)  05/11/2021       Summary   Pt presented with  s/s suggestive of minimal oral and suspected WFL pharyngeal swallowing stage.  Symptoms or concerns included prolonged manipulation of solids but difficulty w/ harder material. S he does not have her dentures here and as seen in 2022 would like to continue w/ level 3 dysphagia.     Risk/s for Aspiration: bottom up -pt w/ GI issues    Recommended Diet: soft/level 3 diet and thin liquids   Recommended Form of Meds:  as desired    Aspiration precautions and swallowing strategies: upright posture, only feed when fully alert, slow rate of feeding, small bites/sips, and alternating bites and sips  Other Recommendations: Continue frequent oral care, GI         Current Medical Status  Pt is a 64 y.o. female who [facility] with dyspnea, nasal congestion, NV, cough w/ mucous.  Pt is lightheaded.  She had a recent admit for aspiration pna following vomiting.         Current Precautions:  Fall  Aspiration   Allergies:  No known food allergies    Past medical history:  Please see H&P for details  Severe gastroparesis, hiatal hernia  Special Studies:  CXR-Patchy opacity in left midlung, likely representing pneumonia, decreased since 5/16/2024. Recommend follow-up PA and lateral chest  radiographs 6-8 weeks after completion of antibiotic treatment to ensure resolution and exclude noninfectious airspace   disease.       Social/Education/Vocational Hx:  [place] alone[place]    Swallow Information   Current Risks for Dysphagia & Aspiration: known history of dysphagia  Current Symptoms/Concerns: change in respiratory status  Current Diet: soft/level 3 diet and thin liquids   Baseline Diet:  regular but describes as a softer diet w/ thin      Baseline Assessment   Behavior/Cognition: alert  Speech/Language Status: able to participate in conversation and able to follow commands  Patient Positioning: upright in bed  Pain Status/Interventions/Response to Interventions:   No report of or nonverbal indications of pain.       Swallow Mechanism Exam  Facial: symmetrical  Labial: WFL  Lingual: WFL  Velum: symmetrical  Mandible: adequate ROM  Dentition: edentulous  Vocal quality:clear/adequate   Volitional Cough: strong/productive   Respiratory Status: on nc      Consistencies Assessed and Performance   Consistencies Administered: thin liquids, puree, mechanical soft solids, soft solids, and hard solids  Materials administered included pretzels, cookie (soft), crackers, water by cup/straw    Oral Stage: mild, decreased mastication, and decreased bolus formation  Mastication was with the harder solid was prolonged, pt stated difficulty.  Improved manipulation and transfer with the other soft materials administered today.  Bolus formation and transfer were functional with no significant oral residue noted.  No overt s/s reduced oral control.    Pharyngeal Stage: WFL and multiple swallows  Swallow Mechanics:  Swallowing initiation appeared prompt.  Laryngeal rise was palpated and judged to be within functional limits.  No coughing, throat clearing, change in vocal quality or respiratory status noted today.     Esophageal Concerns:  heartburn, reflux, feels food not going down and at  "times \"coming back up\"    Strategies and Efficacy: alternate bites w/ sips     Summary and Recommendations (see above)    Results Reviewed with: patient and RN     Treatment Recommended: will follow     Frequency of treatment: as able     Patient Stated Goal:    Dysphagia LTG  -Patient will demonstrate safe and effective oral intake (without overt s/s significant oral/pharyngeal dysphagia including s/s penetration or aspiration) for the highest appropriate diet level.     Short Term Goals:    -Pt will tolerate Dysphagia 3/advanced (dental soft) diet and  thin liquid with no significant s/s oral or pharyngeal dysphagia across 1-3 diagnostic session/s.    -Patient will tolerate trials of upgraded food and/or liquid texture with no significant s/s of oral or pharyngeal dysphagia including aspiration across 1-3 diagnostic sessions     -Patient will comply with a Video/Modified Barium Swallow study for more complete assessment of swallowing anatomy/physiology/aspiration risk and to assess efficacy of treatment techniques so as to best guide treatment plan    Speech Therapy Prognosis   Prognosis: good    Prognosis Considerations: age and prior medical history      "

## 2024-09-27 NOTE — H&P
H&P - Hospitalist   Name: Irene Plascencia 64 y.o. female I MRN: [id number]  Unit/Bed#: ED 17 I Date of Admission: 9/27/2024   Date of Service: 9/27/2024 I Hospital Day: 0     Assessment & Plan  Aspiration pneumonia of left lower lobe (HCC)  Patient recently admitted on 9/16-9/20 for aspiration pneumonia.  Completed course of antibiotics and sent home with 3 days of Keflex  Patient had improvement however had a relapse with increased cough, increased shortness of breath  No fever no chills  Procal negative  Denies orthopnea.  Does not appear markedly overloaded  Patient appears dry  Patient denies however patient may have had another aspiration  Xray shows improved consolidation on the left, increased opacities on the rught  Plan  Speech eval. Last eval in 2022 recommends  soft/level 3 diet and thin liquids . Will resume this for now  Cefipime   Sputum cultures  Normal saline  BNP pending  COVID Flu  Acquired hypothyroidism  Continue levothyroxine  Schreiber's esophagus  Continue pantoprazole 40 mg BID  Sucralfate 3x daily with meals  Primary hypertension  Continue amlodipine  Severe iron deficiency anemia   Patient has normal HGB. Needed venofer as outpatient  Continue to monitor    VTE Pharmacologic Prophylaxis: VTE Score: 4 Moderate Risk (Score 3-4) - Pharmacological DVT Prophylaxis Ordered: enoxaparin (Lovenox).  Code Status: Level 1 - Full Code   Discussion with family: Attempted to update  (significant other) via phone. Unable to contact.    Anticipated Length of Stay: Patient will be admitted on an inpatient basis with an anticipated length of stay of greater than 2 midnights secondary to SOB.    History of Present Illness   Chief Complaint: SOB    Irene Plascencia is a 64 y.o. female with a Past medical history of GERD, Schreiber's esophagus, hiatal hernia, microcytic anemia secondary to KEVIN needing IV Venofer in the past.  Last 1 on 8/7/2024.  Rheumatoid arthritis.     Coming in for  shortness of breath.  Recently admitted from 9/16-9/20 for aspiration pneumonia.  She presents again with progressive dyspnea, yellow mucus, dizziness with lightheadedness.  Vitals on arrival are stable, afebrile.  On 2 L via nasal cannula.  Patient was found to have a mild hyponatremia, mild hypokalemia, creatinine at baseline 0.58 (0.52-0.77).  CBC largely unremarkable.  Procalcitonin pending  Chest x-ray was done in the emergency department which shows bilateral opacities.  Compared to the previous one on September 16.  Last echo was 5/13/2021 which shows 66% LVEF, grade 1 diastolic dysfunction,  Weight as outpatient on 9/13/2024 was 83.9 kg.  No weight was obtained here in the emergency department.    Review of Systems   Constitutional:  Negative for chills and fever.   HENT:  Negative for ear pain and sore throat.    Eyes:  Negative for pain and visual disturbance.   Respiratory:  Positive for cough and shortness of breath.    Cardiovascular:  Negative for chest pain and palpitations.   Gastrointestinal:  Negative for abdominal pain and vomiting.   Genitourinary:  Negative for dysuria and hematuria.   Musculoskeletal:  Negative for arthralgias and back pain.   Skin:  Negative for color change and rash.   Neurological:  Negative for seizures and syncope.   All other systems reviewed and are negative.      I have reviewed the patient's PMH, PSH, Social History, Family History, Meds, and Allergies  Historical Information   Past Medical History:   Diagnosis Date    Anemia     Anxiety     Arthritis     Back pain at L4-L5 level     Schreiber esophagus     Bulimia     Colon polyp     Disease of thyroid gland     hypothyroid    GERD (gastroesophageal reflux disease)     Hearing loss in left ear     History of transfusion     Hyperlipidemia     Hypertension     Hypothyroidism     Leukopenia     NMS (neuroleptic malignant syndrome) 01/03/2020    Pneumonia     RA (rheumatoid arthritis) (HCC)     in feet    Sciatica      Seizure (HCC)     due to medication mix up 8/2018 only one historically    Skin spots, red     lower right arm - poss from cat- no s/s infection    Synovial cyst of lumbar spine     Vertigo     Wears dentures     full set    Weight gain      Past Surgical History:   Procedure Laterality Date    BONE MARROW BIOPSY      BREAST SURGERY      enlargement with implants    CLOSED REDUCTION DISTAL FEMUR FRACTURE      COLONOSCOPY      COSMETIC SURGERY      DENTAL SURGERY      HIP ARTHROPLASTY Right 01/02/2020    Procedure: REVISION TOTAL HIP ARTHROPLASTY WITH REPAIR OF PARIPROSTHETIC FRACTURE - POSTERIOR APPROACH;  Surgeon: Dilan Pedersen MD;  Location: [facility];  Service: Orthopedics    DE AMPUTATION METATARSAL W/TOE SINGLE Left 04/07/2023    Procedure: AMPUTATION TOE 4th;  Surgeon: Conner Bartlett DPM;  Location: [facility];  Service: Podiatry    DE ARTHRP ACETBLR/PROX FEM PROSTC AGRFT/ALGRFT Right 12/11/2019    Procedure: ARTHROPLASTY HIP TOTAL ANTERIOR;  Surgeon: Dilan Pedersen MD;  Location: [facility];  Service: Orthopedics    DE ESOPHAGOGASTRODUODENOSCOPY TRANSORAL DIAGNOSTIC N/A 05/11/2021    Procedure: ESOPHAGOGASTRODUODENOSCOPY (EGD);  Surgeon: David Cedeño MD;  Location: [facility];  Service: General    DE LAPS RPR PARAESPHGL HRNA INCL FUNDPLSTY W/MESH N/A 05/11/2021    Procedure: REPAIR HERNIA PARAESOPHAGEAL  LAPAROSCOPIC;  Surgeon: David Cedeño MD;  [facility]: [facility];  Service: General    DE UNLISTED PROCEDURE ESOPHAGUS N/A 05/11/2021    Procedure: FUNDOPLICATION TRANSORAL INCISIONLESS;  Surgeon: Brad Cadet MD;  Location: [facility];  Service: Gastroenterology    RHINOPLASTY      SINUS SURGERY      TOE AMPUTATION Left     2023 4th toe    TRANSORAL INCISIONLESS FUNDOPLICATION (TIF)  05/11/2021     Social History     Tobacco Use    Smoking status: Never     Passive exposure: Past    Smokeless tobacco: Never   Vaping Use    Vaping status: Never Used   Substance and Sexual Activity    Alcohol use:  Not Currently    Drug use: Not Currently    Sexual activity: Not Currently     E-Cigarette/Vaping    E-Cigarette Use Never User      E-Cigarette/Vaping Substances    Nicotine No     THC No     CBD No     Flavoring No     Other No     Unknown No      Family history non-contributory  Social History     Tobacco Use    Smoking status: Never     Passive exposure: Past    Smokeless tobacco: Never   Vaping Use    Vaping status: Never Used   Substance and Sexual Activity    Alcohol use: Not Currently    Drug use: Not Currently    Sexual activity: Not Currently       Current Facility-Administered Medications:     acetaminophen (TYLENOL) tablet 650 mg, Q6H PRN    [START ON 9/28/2024] amLODIPine (NORVASC) tablet 5 mg, Daily    benzonatate (TESSALON PERLES) capsule 100 mg, TID PRN    buPROPion (WELLBUTRIN) tablet 75 mg, TID    [START ON 9/28/2024] cefepime (MAXIPIME) 2 g/50 mL dextrose IVPB, Q8H    enoxaparin (LOVENOX) subcutaneous injection 40 mg, Daily    guaiFENesin (MUCINEX) 12 hr tablet 600 mg, Q12H YUMIKO    [START ON 9/28/2024] levothyroxine tablet 25 mcg, Daily    LORazepam (ATIVAN) tablet 2 mg, Q4H PRN    metoclopramide (REGLAN) tablet 5 mg, TID AC    pantoprazole (PROTONIX) EC tablet 40 mg, BID    [START ON 9/28/2024] PARoxetine (PAXIL) tablet 60 mg, Daily    QUEtiapine (SEROquel XR) 24 hr tablet 400 mg, HS    sodium chloride 0.9 % infusion, Continuous    sucralfate (CARAFATE) tablet 1 g, TID With Meals    ziprasidone (GEODON) capsule 80 mg, Daily  Prior to Admission Medications   Prescriptions Last Dose Informant Patient Reported? Taking?   LORazepam (ATIVAN) 2 mg tablet   No No   Sig: Take 1 tablet (2 mg total) by mouth every 4 (four) hours as needed for anxiety   PARoxetine (PAXIL) 30 mg tablet   No No   Sig: TAKE 2 TABLETS (60 MG TOTAL) BY MOUTH IN THE MORNING   QUEtiapine (SEROquel XR) 400 mg 24 hr tablet  Self No No   Sig: TAKE 1 TABLET (400 MG TOTAL) BY MOUTH DAILY AT BEDTIME   amLODIPine (NORVASC) 5 mg tablet   Self No No   Sig: TAKE 1 TABLET (5 MG TOTAL) BY MOUTH DAILY.   buPROPion (WELLBUTRIN) 75 mg tablet   Yes No   Sig: Take 75 mg by mouth 3 (three) times a day   levothyroxine 25 mcg tablet  Self No No   Sig: TAKE 1 TABLET BY MOUTH EVERY DAY   metoclopramide (REGLAN) 5 mg tablet   No No   Sig: Take 1 tablet (5 mg total) by mouth 3 (three) times a day before meals   ondansetron (ZOFRAN) 4 mg tablet   No No   Sig: Take 1 tablet (4 mg total) by mouth every 8 (eight) hours as needed for nausea or vomiting   pantoprazole (PROTONIX) 40 mg tablet   No No   Sig: TAKE 1 TABLET BY MOUTH TWICE A DAY   sodium chloride 1 g tablet  Self No No   Sig: Take 2 tablets (2 g total) by mouth 2 (two) times a day   sucralfate (CARAFATE) 1 g tablet   No No   Sig: TAKE 1 TABLET (1 G TOTAL) BY MOUTH 3 (THREE) TIMES A DAY WITH MEALS   ziprasidone (GEODON) 80 mg capsule  Self No No   Sig: TAKE 1 CAPSULE BY MOUTH EVERY DAY      Facility-Administered Medications: None     Latex, Risperdal [risperidone], and Zyprexa [olanzapine]  Social History:  Marital Status: Single   Occupation: none  Patient Pre-hospital Living Situation: Home  Patient Pre-hospital Level of Mobility: walks  Patient Pre-hospital Diet Restrictions: none    Objective     Vitals:   Blood Pressure: 120/67 (09/27/24 1315)  Pulse: 74 (09/27/24 1315)  Temperature: 98.2 °F (36.8 °C) (09/27/24 1012)  Temp Source: Oral (09/27/24 1012)  Respirations: 15 (09/27/24 1315)  SpO2: 95 % (09/27/24 1315)    Physical Exam  Constitutional:       Appearance: She is not ill-appearing or toxic-appearing.   HENT:      Head: Normocephalic and atraumatic.      Right Ear: There is no impacted cerumen.      Left Ear: There is no impacted cerumen.      Mouth/Throat:      Mouth: Mucous membranes are moist.      Pharynx: Oropharynx is clear.   Eyes:      Conjunctiva/sclera: Conjunctivae normal.      Pupils: Pupils are equal, round, and reactive to light.   Cardiovascular:      Rate and Rhythm: Normal rate and  regular rhythm.      Pulses: Normal pulses.      Heart sounds: Normal heart sounds. No murmur heard.     No friction rub.   Pulmonary:      Effort: No respiratory distress.   Abdominal:      General: There is no distension.      Palpations: There is no mass.      Tenderness: There is no abdominal tenderness.      Hernia: No hernia is present.   Musculoskeletal:         General: No swelling, tenderness or deformity.      Cervical back: Normal range of motion.   Skin:     Coloration: Skin is not jaundiced.      Findings: No rash.   Neurological:      General: No focal deficit present.         Lines/Drains:  Lines/Drains/Airways       Active Status       None                        Additional Data:   Lab Results: I have reviewed the following results: CBC/BMP:   .     09/27/24  1153   WBC 10.13   HGB 11.5   HCT 34.4*      SODIUM 134*   K 3.3*   CL 97   CO2 28   BUN 4*   CREATININE 0.58*   GLUC 89      Results from last 7 days   Lab Units 09/27/24  1153   WBC Thousand/uL 10.13   HEMOGLOBIN g/dL 11.5   HEMATOCRIT % 34.4*   PLATELETS Thousands/uL 256   SEGS PCT % 91*   LYMPHO PCT % 4*   MONO PCT % 3*   EOS PCT % 2     Results from last 7 days   Lab Units 09/27/24  1153   SODIUM mmol/L 134*   POTASSIUM mmol/L 3.3*   CHLORIDE mmol/L 97   CO2 mmol/L 28   BUN mg/dL 4*   CREATININE mg/dL 0.58*   ANION GAP mmol/L 9   CALCIUM mg/dL 9.4   ALBUMIN g/dL 4.0   TOTAL BILIRUBIN mg/dL 0.38   ALK PHOS U/L 67   ALT U/L 12   AST U/L 14   GLUCOSE RANDOM mg/dL 89             Lab Results   Component Value Date    HGBA1C 5.0 05/13/2021    HGBA1C 4.5 12/04/2019    HGBA1C 4.9 11/14/2019     Results from last 7 days   Lab Units 09/27/24  1153   PROCALCITONIN ng/ml <0.05       Imaging Review: Reviewed radiology reports from this admission including: chest xray.  Other Studies: EKG was reviewed.     Administrative Statements   I have spent a total time of 80 minutes in caring for this patient on the day of the visit/encounter including  Diagnostic results, Prognosis, Risks and benefits of tx options, Instructions for management, Patient and family education, Importance of tx compliance, Risk factor reductions, Impressions, Counseling / Coordination of care, Documenting in the medical record, Reviewing / ordering tests, medicine, procedures  , Obtaining or reviewing history  , and Communicating with other healthcare professionals .    ** Please Note: This note has been constructed using a voice recognition system. **

## 2024-09-28 ENCOUNTER — APPOINTMENT (INPATIENT)
Dept: NON INVASIVE DIAGNOSTICS | Facility: HOSPITAL | Age: 64
DRG: 178 | End: 2024-09-28
Payer: COMMERCIAL

## 2024-09-28 ENCOUNTER — APPOINTMENT (INPATIENT)
Dept: RADIOLOGY | Facility: HOSPITAL | Age: 64
DRG: 178 | End: 2024-09-28
Payer: COMMERCIAL

## 2024-09-28 LAB
ALBUMIN SERPL BCG-MCNC: 3.9 G/DL (ref 3.5–5)
ALP SERPL-CCNC: 65 U/L (ref 34–104)
ALT SERPL W P-5'-P-CCNC: 15 U/L (ref 7–52)
ANION GAP SERPL CALCULATED.3IONS-SCNC: 8 MMOL/L (ref 4–13)
AORTIC ROOT: 3.1 CM
AORTIC VALVE MEAN VELOCITY: 11.8 M/S
APICAL FOUR CHAMBER EJECTION FRACTION: 66 %
ASCENDING AORTA: 3.5 CM
AST SERPL W P-5'-P-CCNC: 35 U/L (ref 13–39)
ATRIAL RATE: 69 BPM
ATRIAL RATE: 73 BPM
ATRIAL RATE: 91 BPM
AV AREA BY CONTINUOUS VTI: 2.5 CM2
AV AREA PEAK VELOCITY: 2.5 CM2
AV LVOT MEAN GRADIENT: 5 MMHG
AV LVOT PEAK GRADIENT: 8 MMHG
AV MEAN GRADIENT: 6 MMHG
AV PEAK GRADIENT: 12 MMHG
AV VALVE AREA: 2.49 CM2
AV VELOCITY RATIO: 0.8
BASOPHILS # BLD AUTO: 0.03 THOUSANDS/ΜL (ref 0–0.1)
BASOPHILS NFR BLD AUTO: 1 % (ref 0–1)
BILIRUB SERPL-MCNC: 0.35 MG/DL (ref 0.2–1)
BSA FOR ECHO PROCEDURE: 1.83 M2
BUN SERPL-MCNC: 5 MG/DL (ref 5–25)
CALCIUM SERPL-MCNC: 9.1 MG/DL (ref 8.4–10.2)
CHLORIDE SERPL-SCNC: 100 MMOL/L (ref 96–108)
CO2 SERPL-SCNC: 29 MMOL/L (ref 21–32)
CREAT SERPL-MCNC: 0.6 MG/DL (ref 0.6–1.3)
DOP CALC AO PEAK VEL: 1.71 M/S
DOP CALC AO VTI: 37.81 CM
DOP CALC LVOT AREA: 3.14 CM2
DOP CALC LVOT CARDIAC INDEX: 3.77 L/MIN/M2
DOP CALC LVOT CARDIAC OUTPUT: 6.9 L/MIN
DOP CALC LVOT DIAMETER: 2 CM
DOP CALC LVOT PEAK VEL VTI: 29.93 CM
DOP CALC LVOT PEAK VEL: 1.37 M/S
DOP CALC LVOT STROKE INDEX: 49.7 ML/M2
DOP CALC LVOT STROKE VOLUME: 91
E WAVE DECELERATION TIME: 207 MS
E/A RATIO: 0.82
EOSINOPHIL # BLD AUTO: 0.19 THOUSAND/ΜL (ref 0–0.61)
EOSINOPHIL NFR BLD AUTO: 7 % (ref 0–6)
ERYTHROCYTE [DISTWIDTH] IN BLOOD BY AUTOMATED COUNT: 16.4 % (ref 11.6–15.1)
FRACTIONAL SHORTENING: 43 (ref 28–44)
GFR SERPL CREATININE-BSD FRML MDRD: 96 ML/MIN/1.73SQ M
GLUCOSE SERPL-MCNC: 79 MG/DL (ref 65–140)
HCT VFR BLD AUTO: 33.9 % (ref 34.8–46.1)
HGB BLD-MCNC: 11 G/DL (ref 11.5–15.4)
IMM GRANULOCYTES # BLD AUTO: 0 THOUSAND/UL (ref 0–0.2)
IMM GRANULOCYTES NFR BLD AUTO: 0 % (ref 0–2)
INTERVENTRICULAR SEPTUM IN DIASTOLE (PARASTERNAL SHORT AXIS VIEW): 1.2 CM
INTERVENTRICULAR SEPTUM: 1.2 CM (ref 0.6–1.1)
LAAS-AP2: 22.9 CM2
LAAS-AP4: 23.7 CM2
LEFT ATRIUM SIZE: 4.4 CM
LEFT ATRIUM VOLUME (MOD BIPLANE): 78 ML
LEFT ATRIUM VOLUME INDEX (MOD BIPLANE): 42.6 ML/M2
LEFT INTERNAL DIMENSION IN SYSTOLE: 2.6 CM (ref 2.1–4)
LEFT VENTRICULAR INTERNAL DIMENSION IN DIASTOLE: 4.6 CM (ref 3.5–6)
LEFT VENTRICULAR POSTERIOR WALL IN END DIASTOLE: 1.3 CM
LEFT VENTRICULAR STROKE VOLUME: 76 ML
LVSV (TEICH): 76 ML
LYMPHOCYTES # BLD AUTO: 0.62 THOUSANDS/ΜL (ref 0.6–4.47)
LYMPHOCYTES NFR BLD AUTO: 21 % (ref 14–44)
MCH RBC QN AUTO: 28.1 PG (ref 26.8–34.3)
MCHC RBC AUTO-ENTMCNC: 32.4 G/DL (ref 31.4–37.4)
MCV RBC AUTO: 87 FL (ref 82–98)
MONOCYTES # BLD AUTO: 0.28 THOUSAND/ΜL (ref 0.17–1.22)
MONOCYTES NFR BLD AUTO: 10 % (ref 4–12)
MV E'TISSUE VEL-LAT: 10 CM/S
MV E'TISSUE VEL-SEP: 7 CM/S
MV PEAK A VEL: 0.91 M/S
MV PEAK E VEL: 75 CM/S
NEUTROPHILS # BLD AUTO: 1.8 THOUSANDS/ΜL (ref 1.85–7.62)
NEUTS SEG NFR BLD AUTO: 61 % (ref 43–75)
NRBC BLD AUTO-RTO: 0 /100 WBCS
P AXIS: 35 DEGREES
P AXIS: 36 DEGREES
P AXIS: 53 DEGREES
PLATELET # BLD AUTO: 304 THOUSANDS/UL (ref 149–390)
PMV BLD AUTO: 10.1 FL (ref 8.9–12.7)
POTASSIUM SERPL-SCNC: 5 MMOL/L (ref 3.5–5.3)
PR INTERVAL: 128 MS
PR INTERVAL: 132 MS
PR INTERVAL: 146 MS
PROT SERPL-MCNC: 6.4 G/DL (ref 6.4–8.4)
QRS AXIS: 20 DEGREES
QRS AXIS: 21 DEGREES
QRS AXIS: 22 DEGREES
QRSD INTERVAL: 78 MS
QRSD INTERVAL: 82 MS
QRSD INTERVAL: 82 MS
QT INTERVAL: 348 MS
QT INTERVAL: 398 MS
QT INTERVAL: 410 MS
QTC INTERVAL: 428 MS
QTC INTERVAL: 438 MS
QTC INTERVAL: 439 MS
RBC # BLD AUTO: 3.91 MILLION/UL (ref 3.81–5.12)
RIGHT ATRIUM AREA SYSTOLE A4C: 16.5 CM2
RIGHT VENTRICLE ID DIMENSION: 4.2 CM
SINOTUBULAR JUNCTION: 2.6 CM
SL CV LEFT ATRIUM LENGTH A2C: 5.5 CM
SL CV LV EF: 60
SL CV PED ECHO LEFT VENTRICLE DIASTOLIC VOLUME (MOD BIPLANE) 2D: 100 ML
SL CV PED ECHO LEFT VENTRICLE SYSTOLIC VOLUME (MOD BIPLANE) 2D: 24 ML
SL CV SINUS OF VALSALVA 2D: 2.9 CM
SODIUM SERPL-SCNC: 137 MMOL/L (ref 135–147)
STJ: 2.6 CM
T WAVE AXIS: 56 DEGREES
T WAVE AXIS: 57 DEGREES
T WAVE AXIS: 61 DEGREES
TR MAX PG: 33 MMHG
TR PEAK VELOCITY: 2.9 M/S
TRICUSPID ANNULAR PLANE SYSTOLIC EXCURSION: 2 CM
TRICUSPID VALVE PEAK REGURGITATION VELOCITY: 2.89 M/S
VENTRICULAR RATE: 69 BPM
VENTRICULAR RATE: 73 BPM
VENTRICULAR RATE: 91 BPM
WBC # BLD AUTO: 2.92 THOUSAND/UL (ref 4.31–10.16)

## 2024-09-28 PROCEDURE — 87070 CULTURE OTHR SPECIMN AEROBIC: CPT

## 2024-09-28 PROCEDURE — 93010 ELECTROCARDIOGRAM REPORT: CPT | Performed by: INTERNAL MEDICINE

## 2024-09-28 PROCEDURE — 93005 ELECTROCARDIOGRAM TRACING: CPT

## 2024-09-28 PROCEDURE — 85025 COMPLETE CBC W/AUTO DIFF WBC: CPT

## 2024-09-28 PROCEDURE — 93306 TTE W/DOPPLER COMPLETE: CPT | Performed by: INTERNAL MEDICINE

## 2024-09-28 PROCEDURE — 94668 MNPJ CHEST WALL SBSQ: CPT

## 2024-09-28 PROCEDURE — 99232 SBSQ HOSP IP/OBS MODERATE 35: CPT | Performed by: NURSE PRACTITIONER

## 2024-09-28 PROCEDURE — 80053 COMPREHEN METABOLIC PANEL: CPT

## 2024-09-28 PROCEDURE — 93306 TTE W/DOPPLER COMPLETE: CPT

## 2024-09-28 PROCEDURE — 87205 SMEAR GRAM STAIN: CPT

## 2024-09-28 RX ADMIN — QUETIAPINE FUMARATE 400 MG: 200 TABLET, EXTENDED RELEASE ORAL at 22:49

## 2024-09-28 RX ADMIN — GUAIFENESIN 600 MG: 600 TABLET, EXTENDED RELEASE ORAL at 20:41

## 2024-09-28 RX ADMIN — CEFEPIME 2000 MG: 2 INJECTION, POWDER, FOR SOLUTION INTRAVENOUS at 00:24

## 2024-09-28 RX ADMIN — CEFEPIME 2000 MG: 2 INJECTION, POWDER, FOR SOLUTION INTRAVENOUS at 17:42

## 2024-09-28 RX ADMIN — PANTOPRAZOLE SODIUM 40 MG: 40 TABLET, DELAYED RELEASE ORAL at 17:42

## 2024-09-28 RX ADMIN — AMLODIPINE BESYLATE 5 MG: 5 TABLET ORAL at 09:20

## 2024-09-28 RX ADMIN — METOCLOPRAMIDE 5 MG: 5 TABLET ORAL at 09:16

## 2024-09-28 RX ADMIN — LEVOTHYROXINE SODIUM 25 MCG: 25 TABLET ORAL at 05:14

## 2024-09-28 RX ADMIN — SODIUM CHLORIDE 125 ML/HR: 0.9 INJECTION, SOLUTION INTRAVENOUS at 00:23

## 2024-09-28 RX ADMIN — SUCRALFATE 1 G: 1 TABLET ORAL at 12:46

## 2024-09-28 RX ADMIN — PANTOPRAZOLE SODIUM 40 MG: 40 TABLET, DELAYED RELEASE ORAL at 09:20

## 2024-09-28 RX ADMIN — BUPROPION HYDROCHLORIDE 75 MG: 75 TABLET, FILM COATED ORAL at 17:42

## 2024-09-28 RX ADMIN — ENOXAPARIN SODIUM 40 MG: 40 INJECTION SUBCUTANEOUS at 09:20

## 2024-09-28 RX ADMIN — GUAIFENESIN 600 MG: 600 TABLET, EXTENDED RELEASE ORAL at 09:20

## 2024-09-28 RX ADMIN — BENZONATATE 100 MG: 100 CAPSULE ORAL at 09:16

## 2024-09-28 RX ADMIN — BUPROPION HYDROCHLORIDE 75 MG: 75 TABLET, FILM COATED ORAL at 09:15

## 2024-09-28 RX ADMIN — SUCRALFATE 1 G: 1 TABLET ORAL at 17:42

## 2024-09-28 RX ADMIN — CEFEPIME 2000 MG: 2 INJECTION, POWDER, FOR SOLUTION INTRAVENOUS at 09:20

## 2024-09-28 RX ADMIN — PAROXETINE HYDROCHLORIDE 60 MG: 20 TABLET, FILM COATED ORAL at 09:15

## 2024-09-28 NOTE — PROGRESS NOTES
Progress Note - Hospitalist   Name: Irene Plascencia 64 y.o. female I MRN: [id number]  Unit/Bed#: -01 I Date of Admission: 9/27/2024   Date of Service: 9/28/2024 I Hospital Day: 1    Assessment & Plan  Aspiration pneumonia of left lower lobe (HCC)  Patient recently admitted on 9/16-9/20 for aspiration pneumonia.  Completed course of antibiotics and sent home with 3 days of Keflex  Patient had improvement however had a relapse with increased cough, increased shortness of breath  No fever no chills  Procal negative  Denies orthopnea.  Does not appear markedly overloaded  Patient appears dry  Patient denies aspiration but does report could be from her esophageal condition  She is reluctant to stop the reglan for now (she was told to not take it with her geodon per her pharmacist. So she refused geodon this am  We discussed need to take her psych medications and will see if there are alternates for her reglan   Xray shows improved consolidation on the left, increased opacities on the rught  Plan  Speech eval. Last eval in 2022 recommends  soft/level 3 diet and thin liquids . Will resume this for now  Cefipime iv however, procalcitonin is negative   Will repeat in am if negative consider discontinuing   Sputum cultures not obtained   Normal saline will discontinue at 10 pm   BNP negative   Echocardiogram: EF of 60 % systolic function normal no stenosis  COVID Flu negative  Acquired hypothyroidism  Continue levothyroxine  Schreiber's esophagus  Continue pantoprazole 40 mg BID  Sucralfate 3x daily with meals  Primary hypertension  Continue amlodipine  Severe iron deficiency anemia   Patient has normal HGB. Needed venofer as outpatient  Continue to monitor    VTE Pharmacologic Prophylaxis: VTE Score: 4 High Risk (Score >/= 5) - Pharmacological DVT Prophylaxis Ordered: enoxaparin (Lovenox). Sequential Compression Devices Ordered.    Mobility:   Basic Mobility Inpatient Raw Score: 20  -[facility] Goal: 6: Walk 10 steps or  more  JH-HLM Achieved: 6: Walk 10 steps or more  JH-HLM Goal achieved. Continue to encourage appropriate mobility.    Patient Centered Rounds: I performed bedside rounds with nursing staff today.   Discussions with Specialists or Other Care Team Provider: nursing     Education and Discussions with Family / Patient:  patient .  She did not want me to update anyone     Current Length of Stay: 1 day(s)  Current Patient Status: Inpatient   Certification Statement: The patient will continue to require additional inpatient hospital stay due to pending imaging and workup   Discharge Plan: Anticipate discharge in 24-48 hrs to home.    Code Status: Level 1 - Full Code    Subjective   Pt oob in the chair , she has no pain and feels stable at this time. No sob on 2 liters oxygen we discussed taking her oxygen off for now. Pt with findings on xray has not had ct imaging of chest . We discussed doing this at this time     Objective     Vitals:   Temp (24hrs), Av.8 °F (36.6 °C), Min:97.5 °F (36.4 °C), Max:98 °F (36.7 °C)    Temp:  [97.5 °F (36.4 °C)-98 °F (36.7 °C)] 97.5 °F (36.4 °C)  HR:  [72-85] 72  Resp:  [19] 19  BP: (137-139)/(78-82) 139/82  SpO2:  [95 %-100 %] 95 %  Body mass index is 33.11 kg/m².     Input and Output Summary (last 24 hours):   No intake or output data in the 24 hours ending 24    Physical Exam  Constitutional:       General: She is not in acute distress.     Appearance: She is obese. She is not ill-appearing, toxic-appearing or diaphoretic.   HENT:      Head: Normocephalic and atraumatic.   Eyes:      General:         Right eye: No discharge.         Left eye: No discharge.   Cardiovascular:      Heart sounds: No murmur heard.     No friction rub. No gallop.   Pulmonary:      Effort: No respiratory distress.      Breath sounds: No stridor. Rales (bl bases) present. No wheezing or rhonchi.      Comments: Pt is on 2 liters nc 97% will remove and monitor   Chest:      Chest wall: No tenderness.    Abdominal:      General: There is no distension.      Palpations: There is no mass.      Tenderness: There is no abdominal tenderness. There is no guarding or rebound.      Hernia: No hernia is present.   Musculoskeletal:         General: No swelling, tenderness, deformity or signs of injury.      Right lower leg: No edema.      Left lower leg: No edema.   Skin:     Coloration: Skin is not jaundiced or pale.      Findings: No bruising, erythema, lesion or rash.   Neurological:      Mental Status: She is alert and oriented to person, place, and time.   Psychiatric:         Behavior: Behavior normal.          Lines/Drains:  Lines/Drains/Airways       Active Status       None                            Lab Results: I have reviewed the following results:   Results from last 7 days   Lab Units 09/28/24  0503   WBC Thousand/uL 2.92*   HEMOGLOBIN g/dL 11.0*   HEMATOCRIT % 33.9*   PLATELETS Thousands/uL 304   SEGS PCT % 61   LYMPHO PCT % 21   MONO PCT % 10   EOS PCT % 7*     Results from last 7 days   Lab Units 09/28/24  0503   SODIUM mmol/L 137   POTASSIUM mmol/L 5.0   CHLORIDE mmol/L 100   CO2 mmol/L 29   BUN mg/dL 5   CREATININE mg/dL 0.60   ANION GAP mmol/L 8   CALCIUM mg/dL 9.1   ALBUMIN g/dL 3.9   TOTAL BILIRUBIN mg/dL 0.35   ALK PHOS U/L 65   ALT U/L 15   AST U/L 35   GLUCOSE RANDOM mg/dL 79                 Results from last 7 days   Lab Units 09/27/24  1153   PROCALCITONIN ng/ml <0.05       Recent Cultures (last 7 days):         Imaging Review: Reviewed radiology reports from this admission including: chest xray.  Other Studies: EKG was reviewed.     Last 24 Hours Medication List:     Current Facility-Administered Medications:     acetaminophen (TYLENOL) tablet 650 mg, Q6H PRN    albuterol inhalation solution 2.5 mg, Q4H PRN    amLODIPine (NORVASC) tablet 5 mg, Daily    benzonatate (TESSALON PERLES) capsule 100 mg, TID PRN    buPROPion (WELLBUTRIN) tablet 75 mg, TID    cefepime (MAXIPIME) 2 g/50 mL dextrose IVPB,  Q8H, Last Rate: 2,000 mg (09/28/24 1742)    enoxaparin (LOVENOX) subcutaneous injection 40 mg, Daily    guaiFENesin (MUCINEX) 12 hr tablet 600 mg, Q12H YUMIKO    influenza vaccine (PF) (Fluarix) IM injection 0.5 mL, Prior to discharge    levothyroxine tablet 25 mcg, Early Morning    LORazepam (ATIVAN) tablet 2 mg, Q4H PRN    pantoprazole (PROTONIX) EC tablet 40 mg, BID    PARoxetine (PAXIL) tablet 60 mg, Daily    QUEtiapine (SEROquel XR) 24 hr tablet 400 mg, HS    sucralfate (CARAFATE) tablet 1 g, TID With Meals    ziprasidone (GEODON) capsule 80 mg, Daily    Administrative Statements   Today, Patient Was Seen By: ABENA Griffin      **Please Note: This note may have been constructed using a voice recognition system.**

## 2024-09-28 NOTE — CASE MANAGEMENT
Case Management Assessment & Discharge Planning Note    Patient name Irene Plascencia  Location /-01 MRN [id number]  : 1960 Date 2024       Current Admission Date: 2024  Current Admission Diagnosis:Aspiration pneumonia of left lower lobe (HCC)   Patient Active Problem List    Diagnosis Date Noted Date Diagnosed    Leukopenia 2024     Aspiration pneumonia (HCC) 2024     Heartburn 2024     Gastroparesis 2024     Acute cough 2024     Elevated vitamin B12 level 2024     Iron deficiency 2024     Acquired absence of other toe(s), unspecified side ([facility]) 2024     Osteomyelitis, unspecified site, unspecified type ([facility]) 2024     Vitamin B12 deficiency (dietary) anemia 2023     Folate deficiency 2023     Neutropenia ([facility]) 2023     Post concussion syndrome 2023     Sepsis without acute organ dysfunction ([facility]) 2023     Acute respiratory failure with hypoxia ([facility]) 2023     Headache 2023     Nutritional anemia 2023     Stress incontinence 2023     Ulcer of toe, chronic, left, with unspecified severity ([facility]) 2023     Acute encephalopathy 2022     Thrombocytosis 2022     Abdominal pain 2022     Schreiber's esophagus 2022     Microcytic anemia 2022     Self neglect 2022     Pruritus 2022     Upper GI bleed 01/10/2022     Nausea and vomiting 10/22/2021     S/P tooth extraction 10/22/2021     SIRS (systemic inflammatory response syndrome) ([facility]) 2021     Hyperlipidemia 2021     Word finding difficulty 2021     Hiatal hernia with GERD without esophagitis 2021     Polyp of colon 2021     Melena 2020     Cellulitis of left upper extremity 2020     Erosive esophagitis 2020     Rash 2020     Iron deficiency anemia 2020     Multiple excoriations 2020     Fall 2020     Esophagitis  01/27/2020     Hyponatremia 01/27/2020     Problem related to living arrangement 01/27/2020     Hypokalemia      NMS (neuroleptic malignant syndrome) 01/03/2020     Aspiration pneumonia of left lower lobe (HCC) 01/02/2020     Preop exam for internal medicine 11/18/2019     Severe iron deficiency anemia  11/14/2019     Chronic bilateral low back pain without sciatica 11/01/2019     Right hip pain 07/15/2019     Primary hypertension 06/12/2019     Dermal hypersensitivity reaction 11/08/2018     Psychiatric disorder 08/21/2018     Schizoaffective disorder (HCC) 08/16/2018     Serotonin syndrome 08/13/2018     Other fatigue 06/19/2018     Spinal stenosis of lumbar region with neurogenic claudication 06/15/2018     Lumbar spondylosis 06/15/2018     T12 compression fracture (HCC) 06/15/2018     Synovial cyst of lumbar facet joint 06/15/2018     Localized osteoporosis with current pathological fracture with routine healing 05/25/2018     Lumbar radiculopathy 03/19/2018     DDD (degenerative disc disease), lumbar 03/19/2018     Spondylolisthesis at L4-L5 level 03/19/2018     Anxiety 10/31/2016     Acquired hypothyroidism 10/31/2016     Constipation 04/10/2014     Bulimia nervosa in remission 03/19/2014       LOS (days): 1  Geometric Mean LOS (GMLOS) (days):   Days to GMLOS:     OBJECTIVE:  PATIENT READMITTED TO HOSPITAL  Risk of Unplanned Readmission Score: 29.35         Current admission status: Inpatient       Preferred Pharmacy:   [facility]/pharmacy #[id number] - [place], [place] - [address]  [address] [address]  Phone: [phone] Fax: [phone]    Primary Care Provider: Raheem Cain MD    Primary Insurance: [employer]  [facility]    ASSESSMENT:  Active Health Care Proxies       María Darrin [facility] Care Representative - Life Partner   Primary Phone: [phone] (Home)                 Readmission Root Cause  30 Day Readmission: Yes  During your  hospital stay, did someone (provider, nurse, ) explain your care to you in a way you could understand?: Yes  Did you feel medically stable to leave the hospital?: Yes  Were you able to pay for your medication at the pharmacy?: Yes  Did you have reliable transportation to take you to your appointments?: Yes  During previous admission, was a post-acute recommendation made?: No  Patient was readmitted due to: Aspiration pneumonia on left lower lung  Action Plan: Pt on 2L NC, no home O2.  CM following for O2 and discharge needs.    Patient Information  Admitted from:: Home  Mental Status: Alert  During Assessment patient was accompanied by: Not accompanied during assessment  Assessment information provided by:: Patient  Primary Caregiver: Self  Support Systems: Self, Spouse/significant other, Friends/neighbors  County of Residence: [place]  What city do you live in?: [place]  Home entry access options. Select all that apply.: Stairs  Number of steps to enter home.: 2  Type of Current Residence: [facility]  Living Arrangements: Lives Alone  Is patient a ?: No    Activities of Daily Living Prior to Admission  Functional Status: Independent  Completes ADLs independently?: Yes  Ambulates independently?: Yes  Does patient use assisted devices?: Yes  Assisted Devices (DME) used: Straight Cane, Walker  Does patient currently own DME?: Yes  What DME does the patient currently own?: Straight Cane, Walker  Does patient have a history of Outpatient Therapy (PT/OT)?: No  Does the patient have a history of Short-Term Rehab?: Yes ([facility])  Does patient have a history of HHC?: Yes ([facility])       Patient Information Continued  Income Source: SSI/SSD  Does patient have prescription coverage?: Yes  Does patient receive dialysis treatments?: No  Does patient have a history of substance abuse?: No  Does patient have a history of Mental Health Diagnosis?: No       Means of Transportation  Means of  Transport to Appts:: Friends (Friend, Uber, and Insurance transportation.)      Social Determinants of Health (SDOH)      Flowsheet Row Most Recent Value   Housing Stability    In the last 12 months, was there a time when you were not able to pay the mortgage or rent on time? N   In the past 12 months, how many times have you moved where you were living? 0   At any time in the past 12 months, were you homeless or living in a shelter (including now)? N   Transportation Needs    In the past 12 months, has lack of transportation kept you from medical appointments or from getting medications? no   In the past 12 months, has lack of transportation kept you from meetings, work, or from getting things needed for daily living? No   Food Insecurity    Within the past 12 months, you worried that your food would run out before you got the money to buy more. Never true   Within the past 12 months, the food you bought just didn't last and you didn't have money to get more. Never true   Utilities    In the past 12 months has the electric, gas, oil, or water company threatened to shut off services in your home? No            DISCHARGE DETAILS:    Discharge planning discussed with:: Patient  Freedom of Choice: Yes  Comments - Freedom of Choice: Gustavo discussed.  CM will discuss further when recs available.     Other Referral/Resources/Interventions Provided:  Referral Comments: Pt was readmitted due to left lower lung aspiration pneumonia.  2L NC O2, no home O2.  CM following for DC needs.  TBD     CM met with pt at bedside. Introduced myself and explained my role.  Pt lives alone in [place] with 2 STR.  She has friends who help with transportation to the grocery store and to appointments, or she uses uber or insurance transportation.  IADL.  DME include cane and walker.

## 2024-09-28 NOTE — ASSESSMENT & PLAN NOTE
Patient recently admitted on 9/16-9/20 for aspiration pneumonia.  Completed course of antibiotics and sent home with 3 days of Keflex  Patient had improvement however had a relapse with increased cough, increased shortness of breath  No fever no chills  Procal negative  Denies orthopnea.  Does not appear markedly overloaded  Patient appears dry  Patient denies aspiration but does report could be from her esophageal condition  She is reluctant to stop the reglan for now (she was told to not take it with her geodon per her pharmacist. So she refused geodon this am  We discussed need to take her psych medications and will see if there are alternates for her reglan   Xray shows improved consolidation on the left, increased opacities on the rught  Plan  Speech eval. Last eval in 2022 recommends  soft/level 3 diet and thin liquids . Will resume this for now  Cefipime iv however, procalcitonin is negative   Will repeat in am if negative consider discontinuing   Sputum cultures not obtained   Normal saline will discontinue at 10 pm   BNP negative   Echocardiogram: EF of 60 % systolic function normal no stenosis  COVID Flu negative   Solar[employer] Pregnancy And Lactation Text: This medication is Pregnancy Category B and is considered safe. There is some data to suggest avoiding during the third trimester. It is unknown if this medication is excreted in breast milk.

## 2024-09-28 NOTE — PLAN OF CARE
Problem: PAIN - ADULT  Goal: Verbalizes/displays adequate comfort level or baseline comfort level  Description: Interventions:  - Encourage patient to monitor pain and request assistance  - Assess pain using appropriate pain scale  - Administer analgesics based on type and severity of pain and evaluate response  - Implement non-pharmacological measures as appropriate and evaluate response  - Consider cultural and social influences on pain and pain management  - Notify physician/advanced practitioner if interventions unsuccessful or patient reports new pain  Reactivated     Problem: INFECTION - ADULT  Goal: Absence or prevention of progression during hospitalization  Description: INTERVENTIONS:  - Assess and monitor for signs and symptoms of infection  - Monitor lab/diagnostic results  - Monitor all insertion sites, i.e. indwelling lines, tubes, and drains  - Monitor endotracheal if appropriate and nasal secretions for changes in amount and color  - [place] appropriate cooling/warming therapies per order  - Administer medications as ordered  - Instruct and encourage patient and family to use good hand hygiene technique  - Identify and instruct in appropriate isolation precautions for identified infection/condition  Reactivated     Problem: SAFETY ADULT  Goal: Patient will remain free of falls  Description: INTERVENTIONS:  - Educate patient/family on patient safety including physical limitations  - Instruct patient to call for assistance with activity   - Consult OT/PT to assist with strengthening/mobility   - Keep Call bell within reach  - Keep bed low and locked with side rails adjusted as appropriate  - Keep care items and personal belongings within reach  - Initiate and maintain comfort rounds  - Make Fall Risk Sign visible to staff  - Offer Toileting every 2 Hours, in advance of need  - Initiate/Maintain bed/chair alarm  - Obtain necessary fall risk management equipment: slippers  - Apply yellow socks and  bracelet for high fall risk patients  - Consider moving patient to room near nurses station  Reactivated     Problem: DISCHARGE PLANNING  Goal: Discharge to home or other facility with appropriate resources  Description: INTERVENTIONS:  - Identify barriers to discharge w/patient and caregiver  - Arrange for needed discharge resources and transportation as appropriate  - Identify discharge learning needs (meds, wound care, etc.)  - Arrange for interpretive services to assist at discharge as needed  - Refer to Case Management Department for coordinating discharge planning if the patient needs post-hospital services based on physician/advanced practitioner order or complex needs related to functional status, cognitive ability, or social support system  Reactivated     Problem: Knowledge Deficit  Goal: Patient/family/caregiver demonstrates understanding of disease process, treatment plan, medications, and discharge instructions  Description: Complete learning assessment and assess knowledge base.  Interventions:  - Provide teaching at level of understanding  - Provide teaching via preferred learning methods  Reactivated

## 2024-09-29 ENCOUNTER — APPOINTMENT (INPATIENT)
Dept: RADIOLOGY | Facility: HOSPITAL | Age: 64
DRG: 178 | End: 2024-09-29
Payer: COMMERCIAL

## 2024-09-29 PROCEDURE — 99232 SBSQ HOSP IP/OBS MODERATE 35: CPT | Performed by: NURSE PRACTITIONER

## 2024-09-29 PROCEDURE — 94668 MNPJ CHEST WALL SBSQ: CPT

## 2024-09-29 PROCEDURE — 71260 CT THORAX DX C+: CPT

## 2024-09-29 PROCEDURE — 94664 DEMO&/EVAL PT USE INHALER: CPT

## 2024-09-29 RX ORDER — METOCLOPRAMIDE 10 MG/1
10 TABLET ORAL
Status: DISCONTINUED | OUTPATIENT
Start: 2024-09-29 | End: 2024-10-01 | Stop reason: HOSPADM

## 2024-09-29 RX ADMIN — CEFEPIME 2000 MG: 2 INJECTION, POWDER, FOR SOLUTION INTRAVENOUS at 01:19

## 2024-09-29 RX ADMIN — METOCLOPRAMIDE 10 MG: 10 TABLET ORAL at 18:26

## 2024-09-29 RX ADMIN — ENOXAPARIN SODIUM 40 MG: 40 INJECTION SUBCUTANEOUS at 10:01

## 2024-09-29 RX ADMIN — PAROXETINE HYDROCHLORIDE 60 MG: 20 TABLET, FILM COATED ORAL at 10:03

## 2024-09-29 RX ADMIN — CEFEPIME 2000 MG: 2 INJECTION, POWDER, FOR SOLUTION INTRAVENOUS at 10:01

## 2024-09-29 RX ADMIN — GUAIFENESIN 600 MG: 600 TABLET, EXTENDED RELEASE ORAL at 21:28

## 2024-09-29 RX ADMIN — CEFEPIME 2000 MG: 2 INJECTION, POWDER, FOR SOLUTION INTRAVENOUS at 18:31

## 2024-09-29 RX ADMIN — GUAIFENESIN 600 MG: 600 TABLET, EXTENDED RELEASE ORAL at 10:01

## 2024-09-29 RX ADMIN — SUCRALFATE 1 G: 1 TABLET ORAL at 18:16

## 2024-09-29 RX ADMIN — LEVOTHYROXINE SODIUM 25 MCG: 25 TABLET ORAL at 05:12

## 2024-09-29 RX ADMIN — AMLODIPINE BESYLATE 5 MG: 5 TABLET ORAL at 10:01

## 2024-09-29 RX ADMIN — QUETIAPINE FUMARATE 400 MG: 200 TABLET, EXTENDED RELEASE ORAL at 21:28

## 2024-09-29 RX ADMIN — IOHEXOL 85 ML: 350 INJECTION, SOLUTION INTRAVENOUS at 04:46

## 2024-09-29 RX ADMIN — SUCRALFATE 1 G: 1 TABLET ORAL at 11:46

## 2024-09-29 RX ADMIN — PANTOPRAZOLE SODIUM 40 MG: 40 TABLET, DELAYED RELEASE ORAL at 10:01

## 2024-09-29 RX ADMIN — PANTOPRAZOLE SODIUM 40 MG: 40 TABLET, DELAYED RELEASE ORAL at 18:32

## 2024-09-29 NOTE — PROGRESS NOTES
[facility]    [address] [facility] [address]    Phone:  [phone]           Sravani Oliva Candelario   MRN: [id number]    Department:  [facility]   Date of Visit:  1/18/2017           Diagnosis     Encounter for medical screening examination        You were seen by Christopher Caraballo MD and Cee Nieto PA-C.      Disclaimer     Follow-up Care:  It is your responsibility to arrange for follow-up care with your healthcare provider or as instructed. Call to get an appointment time.           Contact your doctor for follow-up appointment if not already scheduled.     Follow up with Dave Roper MD In 2 days.    Specialty:  Pediatrics    Contact information    [address] [address]  [phone]        Medications you received while in the ED through 01/18/2017  5:33 PM     None         What to Do with Your Medications      Notice     No changes were made to your prescriptions during this visit.            Procedures     None      Imaging Results     None        Discharge Instructions       Home Care:  Medications: The doctor may prescribe saline nose drops to thin the nasal mucus. Medications to treat fever or wheezing may be prescribed. Follow the doctor’s instructions when giving these medications to your child.  General Care:   1. Allow time for frequent and quiet feedings. Use a medicine dropper, if necessary, to give small amounts of breast milk or formula. Give 1 to 2 teaspoons every 10 to 15 minutes.  2. Clear your baby’s nose with a suction bulb. Squeeze the bulb first, gently place the rubber tip into one nostril. Slowly release bulb. The suction will draw the clogged mucus out of the nose.  3. Wash your hands well with soap and warm water before and after caring for your baby to prevent spreading infection.  4. Have your baby sleep in a slightly upright position to make breathing easier.  5. Avoid exposure to air pollution and cigarette smoke.  Progress Note - Hospitalist   Name: Irene Plascencia 64 y.o. female I MRN: [id number]  Unit/Bed#: -01 I Date of Admission: 9/27/2024   Date of Service: 9/29/2024 I Hospital Day: 2    Assessment & Plan  Aspiration pneumonia of left lower lobe (HCC)  Patient recently admitted on 9/16-9/20 for aspiration pneumonia.  Completed course of antibiotics and sent home with 3 days of Keflex  Patient had improvement however had a relapse with increased cough, increased shortness of breath  No fever no chills  Procal negative  Denies orthopnea.  Does not appear markedly overloaded  Patient appears dry  Patient denies aspiration but does report could be from her esophageal condition  She is reluctant to stop the reglan for now (she was told to not take it with her geodon per her pharmacist. So she refused geodon . Now taking and holding reglan for now   We discussed need to take her psych medications and will see if there are alternates for her reglan   Xray shows improved consolidation on the left, increased opacities on the right   CT chest w iv contrast 9/29: Left greater than right lung opacities most compatible with infection. May be secondary to aspiration, especially given chronic findings of esophagitis. Subcentimeter right upper lobe nodular consolidation which is suspected infectious/inflammatory. Recommend follow-up in 3 months.   Speech eval. Last eval in 2022 recommends  soft/level 3 diet and thin liquids . Will resume this for now  Cefipime iv however, procalcitonin is negative   Based on CT imaging we will continue.  Discussed with pulmonary  Sputum cultures not obtained   Normal saline discontinued 9/28/2024  BNP negative   Echocardiogram: EF of 60 % systolic function normal no stenosis  COVID Flu negative  Acquired hypothyroidism  Continue levothyroxine  Schreiber's esophagus  Continue pantoprazole 40 mg BID  Sucralfate 3x daily with meals  Primary hypertension  Continue amlodipine  Severe iron deficiency  They can make breathing more difficult.  Follow Up  as advised by the doctor or our staff. If a chest x-ray was done, it will be reviewed by a specialist. You will be notified of any new findings that may affect your baby’s care.  Special Notes To Parents:  If your baby has a chronic illness and any difficulty breathing, call the doctor.  Get Prompt Medical Attention  if any of the following occur:  · Fever greater than 100.4°F (38°C) rectal  · Continuing symptoms, more difficulty breathing, or a blue tinge around lips and fingernails  · Poor feeding  · Signs of dehydration, such as dry mouth, sunken eyes, or urinating less than normal          Discharge References/Attachments     None      Medication Safety: What you need to know     {art goals dragon:[id number]}         anemia   Patient has normal HGB. Needed venofer as outpatient  Continue to monitor    VTE Pharmacologic Prophylaxis: VTE Score: 4 High Risk (Score >/= 5) - Pharmacological DVT Prophylaxis Ordered: enoxaparin (Lovenox). Sequential Compression Devices Ordered.    Mobility:   Basic Mobility Inpatient Raw Score: 20  JH-HLM Goal: 6: Walk 10 steps or more  JH-HLM Achieved: 6: Walk 10 steps or more  JH-HLM Goal NOT achieved. Continue with multidisciplinary rounding and encourage appropriate mobility to improve upon JH-HLM goals.    Patient Centered Rounds: I performed bedside rounds with nursing staff today.   Discussions with Specialists or Other Care Team Provider: Nursing    Education and Discussions with Family / Patient: Patient declined call to .     Current Length of Stay: 2 day(s)  Current Patient Status: Inpatient   Certification Statement: The patient will continue to require additional inpatient hospital stay due to ongoing need for IV antibiotics and monitoring  Discharge Plan: Anticipate discharge in 48-72 hrs to home with home services.    Code Status: Level 1 - Full Code    Subjective   Patient sitting in chair.  Incentive spirometer in front of her.  She will reports that she is utilizing this.  She denies any significant coughing denies any obvious signs of aspiration denies any fevers or chills.  Currently on room air satting 92%.  She feels well but is very nervous based on her recent history of being in the ICU with aspiration pneumonia, per patient's report.  We discussed findings of CAT scan will obtain labs in the a.m. continue antibiotics for now    Objective     Vitals:   Temp (24hrs), Av.2 °F (36.8 °C), Min:98 °F (36.7 °C), Max:98.3 °F (36.8 °C)    Temp:  [98 °F (36.7 °C)-98.3 °F (36.8 °C)] 98.3 °F (36.8 °C)  HR:  [67-72] 71  Resp:  [18] 18  BP: (123-131)/(77-80) 123/77  SpO2:  [97 %-99 %] 97 %  Body mass index is 32.78 kg/m².     Input and Output Summary (last 24 hours):   No  intake or output data in the 24 hours ending 09/29/24 1509    Physical Exam  HENT:      Head: Normocephalic and atraumatic.   Eyes:      General:         Right eye: No discharge.         Left eye: No discharge.   Cardiovascular:      Rate and Rhythm: Normal rate.      Heart sounds: No murmur heard.     No friction rub. No gallop.   Pulmonary:      Effort: No respiratory distress.      Breath sounds: No stridor. Rales (Right lower lobe greater than left lower lobe crackles) present. No wheezing or rhonchi.   Chest:      Chest wall: No tenderness.   Abdominal:      General: There is no distension.      Palpations: There is no mass.      Tenderness: There is no abdominal tenderness. There is no guarding or rebound.      Hernia: No hernia is present.   Musculoskeletal:         General: No swelling, tenderness, deformity or signs of injury.      Right lower leg: No edema.      Left lower leg: No edema.   Skin:     Coloration: Skin is not jaundiced or pale.      Findings: No bruising, erythema, lesion or rash.   Neurological:      Mental Status: She is alert and oriented to person, place, and time.   Psychiatric:         Behavior: Behavior normal.          Lines/Drains:  Lines/Drains/Airways       Active Status       None                            Lab Results: I have reviewed the following results:   Results from last 7 days   Lab Units 09/28/24  0503   WBC Thousand/uL 2.92*   HEMOGLOBIN g/dL 11.0*   HEMATOCRIT % 33.9*   PLATELETS Thousands/uL 304   SEGS PCT % 61   LYMPHO PCT % 21   MONO PCT % 10   EOS PCT % 7*     Results from last 7 days   Lab Units 09/28/24  0503   SODIUM mmol/L 137   POTASSIUM mmol/L 5.0   CHLORIDE mmol/L 100   CO2 mmol/L 29   BUN mg/dL 5   CREATININE mg/dL 0.60   ANION GAP mmol/L 8   CALCIUM mg/dL 9.1   ALBUMIN g/dL 3.9   TOTAL BILIRUBIN mg/dL 0.35   ALK PHOS U/L 65   ALT U/L 15   AST U/L 35   GLUCOSE RANDOM mg/dL 79                 Results from last 7 days   Lab Units 09/27/24  1153   PROCALCITONIN  ng/ml <0.05       Recent Cultures (last 7 days):   Results from last 7 days   Lab Units 09/28/24  0754   SPUTUM CULTURE  2+ Growth of   GRAM STAIN RESULT  2+ Epithelial cells per low power field*  1+ Polys*  1+ Gram positive cocci in clusters*       Imaging Review: Reviewed radiology reports from this admission including: CT chest.  Other Studies: No additional pertinent studies reviewed.    Last 24 Hours Medication List:     Current Facility-Administered Medications:     acetaminophen (TYLENOL) tablet 650 mg, Q6H PRN    albuterol inhalation solution 2.5 mg, Q4H PRN    amLODIPine (NORVASC) tablet 5 mg, Daily    benzonatate (TESSALON PERLES) capsule 100 mg, TID PRN    buPROPion (WELLBUTRIN) tablet 75 mg, TID    cefepime (MAXIPIME) 2 g/50 mL dextrose IVPB, Q8H, Last Rate: 2,000 mg (09/29/24 1001)    enoxaparin (LOVENOX) subcutaneous injection 40 mg, Daily    guaiFENesin (MUCINEX) 12 hr tablet 600 mg, Q12H YUMIKO    influenza vaccine (PF) (Fluarix) IM injection 0.5 mL, Prior to discharge    levothyroxine tablet 25 mcg, Early Morning    LORazepam (ATIVAN) tablet 2 mg, Q4H PRN    pantoprazole (PROTONIX) EC tablet 40 mg, BID    PARoxetine (PAXIL) tablet 60 mg, Daily    QUEtiapine (SEROquel XR) 24 hr tablet 400 mg, HS    sucralfate (CARAFATE) tablet 1 g, TID With Meals    ziprasidone (GEODON) capsule 80 mg, Daily    Administrative Statements   Today, Patient Was Seen By: ABENA Griffin      **Please Note: This note may have been constructed using a voice recognition system.**

## 2024-09-29 NOTE — PLAN OF CARE
Problem: PAIN - ADULT  Goal: Verbalizes/displays adequate comfort level or baseline comfort level  Description: Interventions:  - Encourage patient to monitor pain and request assistance  - Assess pain using appropriate pain scale  - Administer analgesics based on type and severity of pain and evaluate response  - Implement non-pharmacological measures as appropriate and evaluate response  - Consider cultural and social influences on pain and pain management  - Notify physician/advanced practitioner if interventions unsuccessful or patient reports new pain  Outcome: Progressing     Problem: INFECTION - ADULT  Goal: Absence or prevention of progression during hospitalization  Description: INTERVENTIONS:  - Assess and monitor for signs and symptoms of infection  - Monitor lab/diagnostic results  - Monitor all insertion sites, i.e. indwelling lines, tubes, and drains  - Monitor endotracheal if appropriate and nasal secretions for changes in amount and color  - [place] appropriate cooling/warming therapies per order  - Administer medications as ordered  - Instruct and encourage patient and family to use good hand hygiene technique  - Identify and instruct in appropriate isolation precautions for identified infection/condition  Outcome: Progressing     Problem: SAFETY ADULT  Goal: Patient will remain free of falls  Description: INTERVENTIONS:  - Educate patient/family on patient safety including physical limitations  - Instruct patient to call for assistance with activity   - Consult OT/PT to assist with strengthening/mobility   - Keep Call bell within reach  - Keep bed low and locked with side rails adjusted as appropriate  - Keep care items and personal belongings within reach  - Initiate and maintain comfort rounds  - Make Fall Risk Sign visible to staff  - Offer Toileting every 2 Hours, in advance of need  - Initiate/Maintain bed/chair alarm  - Obtain necessary fall risk management equipment: slippers  - Apply  yellow socks and bracelet for high fall risk patients  - Consider moving patient to room near nurses station  Outcome: Progressing     Problem: DISCHARGE PLANNING  Goal: Discharge to home or other facility with appropriate resources  Description: INTERVENTIONS:  - Identify barriers to discharge w/patient and caregiver  - Arrange for needed discharge resources and transportation as appropriate  - Identify discharge learning needs (meds, wound care, etc.)  - Arrange for interpretive services to assist at discharge as needed  - Refer to Case Management Department for coordinating discharge planning if the patient needs post-hospital services based on physician/advanced practitioner order or complex needs related to functional status, cognitive ability, or social support system  Outcome: Progressing     Problem: Knowledge Deficit  Goal: Patient/family/caregiver demonstrates understanding of disease process, treatment plan, medications, and discharge instructions  Description: Complete learning assessment and assess knowledge base.  Interventions:  - Provide teaching at level of understanding  - Provide teaching via preferred learning methods  Outcome: Progressing

## 2024-09-29 NOTE — ASSESSMENT & PLAN NOTE
Patient recently admitted on 9/16-9/20 for aspiration pneumonia.  Completed course of antibiotics and sent home with 3 days of Keflex  Patient had improvement however had a relapse with increased cough, increased shortness of breath  No fever no chills  Procal negative  Denies orthopnea.  Does not appear markedly overloaded  Patient appears dry  Patient denies aspiration but does report could be from her esophageal condition  She is reluctant to stop the reglan for now (she was told to not take it with her geodon per her pharmacist. So she refused geodon . Now taking and holding reglan for now   We discussed need to take her psych medications and will see if there are alternates for her reglan   Xray shows improved consolidation on the left, increased opacities on the right   CT chest w iv contrast 9/29: Left greater than right lung opacities most compatible with infection. May be secondary to aspiration, especially given chronic findings of esophagitis. Subcentimeter right upper lobe nodular consolidation which is suspected infectious/inflammatory. Recommend follow-up in 3 months.   Speech eval. Last eval in 2022 recommends  soft/level 3 diet and thin liquids . Will resume this for now  Cefipime iv however, procalcitonin is negative   Based on CT imaging we will continue.  Discussed with pulmonary  Sputum cultures not obtained   Normal saline discontinued 9/28/2024  BNP negative   Echocardiogram: EF of 60 % systolic function normal no stenosis  COVID Flu negative

## 2024-09-30 LAB
ALBUMIN SERPL BCG-MCNC: 3.8 G/DL (ref 3.5–5)
ALP SERPL-CCNC: 62 U/L (ref 34–104)
ALT SERPL W P-5'-P-CCNC: 12 U/L (ref 7–52)
ANION GAP SERPL CALCULATED.3IONS-SCNC: 6 MMOL/L (ref 4–13)
AST SERPL W P-5'-P-CCNC: 13 U/L (ref 13–39)
ATRIAL RATE: 69 BPM
ATRIAL RATE: 73 BPM
BACTERIA SPT RESP CULT: ABNORMAL
BASOPHILS # BLD AUTO: 0.04 THOUSANDS/ÂΜL (ref 0–0.1)
BASOPHILS NFR BLD AUTO: 1 % (ref 0–1)
BILIRUB SERPL-MCNC: 0.34 MG/DL (ref 0.2–1)
BUN SERPL-MCNC: 8 MG/DL (ref 5–25)
CALCIUM SERPL-MCNC: 9.4 MG/DL (ref 8.4–10.2)
CHLORIDE SERPL-SCNC: 101 MMOL/L (ref 96–108)
CO2 SERPL-SCNC: 29 MMOL/L (ref 21–32)
CREAT SERPL-MCNC: 0.57 MG/DL (ref 0.6–1.3)
EOSINOPHIL # BLD AUTO: 0.16 THOUSAND/ÂΜL (ref 0–0.61)
EOSINOPHIL NFR BLD AUTO: 6 % (ref 0–6)
ERYTHROCYTE [DISTWIDTH] IN BLOOD BY AUTOMATED COUNT: 15.9 % (ref 11.6–15.1)
GFR SERPL CREATININE-BSD FRML MDRD: 98 ML/MIN/1.73SQ M
GLUCOSE SERPL-MCNC: 91 MG/DL (ref 65–140)
GRAM STN SPEC: ABNORMAL
HCT VFR BLD AUTO: 35.9 % (ref 34.8–46.1)
HGB BLD-MCNC: 11.4 G/DL (ref 11.5–15.4)
IMM GRANULOCYTES # BLD AUTO: 0.01 THOUSAND/UL (ref 0–0.2)
IMM GRANULOCYTES NFR BLD AUTO: 0 % (ref 0–2)
LYMPHOCYTES # BLD AUTO: 0.54 THOUSANDS/ÂΜL (ref 0.6–4.47)
LYMPHOCYTES NFR BLD AUTO: 19 % (ref 14–44)
MCH RBC QN AUTO: 27.5 PG (ref 26.8–34.3)
MCHC RBC AUTO-ENTMCNC: 31.8 G/DL (ref 31.4–37.4)
MCV RBC AUTO: 87 FL (ref 82–98)
MONOCYTES # BLD AUTO: 0.28 THOUSAND/ÂΜL (ref 0.17–1.22)
MONOCYTES NFR BLD AUTO: 10 % (ref 4–12)
NEUTROPHILS # BLD AUTO: 1.78 THOUSANDS/ÂΜL (ref 1.85–7.62)
NEUTS SEG NFR BLD AUTO: 64 % (ref 43–75)
NRBC BLD AUTO-RTO: 0 /100 WBCS
P AXIS: 35 DEGREES
P AXIS: 36 DEGREES
PLATELET # BLD AUTO: 247 THOUSANDS/UL (ref 149–390)
PMV BLD AUTO: 8.7 FL (ref 8.9–12.7)
POTASSIUM SERPL-SCNC: 3.9 MMOL/L (ref 3.5–5.3)
PR INTERVAL: 132 MS
PR INTERVAL: 146 MS
PROCALCITONIN SERPL-MCNC: <0.05 NG/ML
PROT SERPL-MCNC: 6.3 G/DL (ref 6.4–8.4)
QRS AXIS: 21 DEGREES
QRS AXIS: 22 DEGREES
QRSD INTERVAL: 82 MS
QRSD INTERVAL: 82 MS
QT INTERVAL: 398 MS
QT INTERVAL: 410 MS
QTC INTERVAL: 438 MS
QTC INTERVAL: 439 MS
RBC # BLD AUTO: 4.14 MILLION/UL (ref 3.81–5.12)
SODIUM SERPL-SCNC: 136 MMOL/L (ref 135–147)
T WAVE AXIS: 56 DEGREES
T WAVE AXIS: 57 DEGREES
VENTRICULAR RATE: 69 BPM
VENTRICULAR RATE: 73 BPM
WBC # BLD AUTO: 2.81 THOUSAND/UL (ref 4.31–10.16)

## 2024-09-30 PROCEDURE — 93010 ELECTROCARDIOGRAM REPORT: CPT | Performed by: INTERNAL MEDICINE

## 2024-09-30 PROCEDURE — 80053 COMPREHEN METABOLIC PANEL: CPT | Performed by: NURSE PRACTITIONER

## 2024-09-30 PROCEDURE — 84145 PROCALCITONIN (PCT): CPT | Performed by: NURSE PRACTITIONER

## 2024-09-30 PROCEDURE — 85025 COMPLETE CBC W/AUTO DIFF WBC: CPT | Performed by: NURSE PRACTITIONER

## 2024-09-30 PROCEDURE — 99232 SBSQ HOSP IP/OBS MODERATE 35: CPT | Performed by: NURSE PRACTITIONER

## 2024-09-30 RX ADMIN — METOCLOPRAMIDE 10 MG: 10 TABLET ORAL at 06:03

## 2024-09-30 RX ADMIN — QUETIAPINE FUMARATE 400 MG: 200 TABLET, EXTENDED RELEASE ORAL at 21:25

## 2024-09-30 RX ADMIN — AMLODIPINE BESYLATE 5 MG: 5 TABLET ORAL at 09:00

## 2024-09-30 RX ADMIN — LEVOTHYROXINE SODIUM 25 MCG: 25 TABLET ORAL at 06:03

## 2024-09-30 RX ADMIN — ZIPRASIDONE HYDROCHLORIDE 80 MG: 40 CAPSULE ORAL at 09:01

## 2024-09-30 RX ADMIN — SUCRALFATE 1 G: 1 TABLET ORAL at 06:42

## 2024-09-30 RX ADMIN — METOCLOPRAMIDE 10 MG: 10 TABLET ORAL at 12:56

## 2024-09-30 RX ADMIN — PANTOPRAZOLE SODIUM 40 MG: 40 TABLET, DELAYED RELEASE ORAL at 09:00

## 2024-09-30 RX ADMIN — SUCRALFATE 1 G: 1 TABLET ORAL at 17:22

## 2024-09-30 RX ADMIN — METOCLOPRAMIDE 10 MG: 10 TABLET ORAL at 17:23

## 2024-09-30 RX ADMIN — CEFEPIME 2000 MG: 2 INJECTION, POWDER, FOR SOLUTION INTRAVENOUS at 02:00

## 2024-09-30 RX ADMIN — PAROXETINE HYDROCHLORIDE 60 MG: 20 TABLET, FILM COATED ORAL at 09:01

## 2024-09-30 RX ADMIN — CEFEPIME 2000 MG: 2 INJECTION, POWDER, FOR SOLUTION INTRAVENOUS at 17:25

## 2024-09-30 RX ADMIN — CEFEPIME 2000 MG: 2 INJECTION, POWDER, FOR SOLUTION INTRAVENOUS at 09:00

## 2024-09-30 RX ADMIN — PANTOPRAZOLE SODIUM 40 MG: 40 TABLET, DELAYED RELEASE ORAL at 17:22

## 2024-09-30 RX ADMIN — ENOXAPARIN SODIUM 40 MG: 40 INJECTION SUBCUTANEOUS at 09:00

## 2024-09-30 RX ADMIN — SUCRALFATE 1 G: 1 TABLET ORAL at 12:56

## 2024-09-30 NOTE — ASSESSMENT & PLAN NOTE
Patient recently admitted on 9/16-9/20 for aspiration pneumonia.  Completed course of antibiotics and sent home with 3 days of Keflex  Patient had improvement however had a relapse with increased cough, increased shortness of breath  No fever no chills  Procal negative  Denies orthopnea.  Does not appear markedly overloaded  Patient appears dry  Patient denies aspiration but does report could be from her esophageal condition  She is reluctant to stop the reglan for now (she was told to not take it with her geodon per her pharmacist. So she refused geodon . Now taking  wanted to remain on her oral reglan as she has concerns for aspiration  We discussed need to take her psych medications pt will call her doctor at discharge and follow up in regard to a wean if needed from her Geodon   Xray shows improved consolidation on the left, increased opacities on the right   CT chest w iv contrast 9/29: Left greater than right lung opacities most compatible with infection. May be secondary to aspiration, especially given chronic findings of esophagitis. Subcentimeter right upper lobe nodular consolidation which is suspected infectious/inflammatory. Recommend follow-up in 3 months.   Speech eval. Last eval in 2022 recommends  soft/level 3 diet and thin liquids . Will resume this for now  Cefipime iv however, procalcitonin is negative   Based on CT imaging we will continue iv abx for total of  5 days   Pt on day 4 of 5 and based on ct imaging and pt symptoms chose to continue broad spectrum for total 5 days with discharge 10/1  Sputum cultures with mixed leeanna   Normal saline discontinued 9/28/2024  BNP negative   Echocardiogram: EF of 60 % systolic function normal no stenosis  COVID Flu negative

## 2024-09-30 NOTE — UTILIZATION REVIEW
Initial Clinical Review    Admission: Date/Time/Statement:   Admission Orders (From admission, onward)       Ordered        09/27/24 1346  INPATIENT ADMISSION  Once                          Orders Placed This Encounter   Procedures    INPATIENT ADMISSION     Standing Status:   Standing     Number of Occurrences:   1     Order Specific Question:   Level of Care     Answer:   Med Surg [16]     Order Specific Question:   Estimated length of stay     Answer:   More than 2 Midnights     Order Specific Question:   Certification     Answer:   I certify that inpatient services are medically necessary for this patient for a duration of greater than two midnights. See H&P and MD Progress Notes for additional information about the patient's course of treatment.     ED Arrival Information       Expected   9/27/2024     Arrival   9/27/2024 10:07    Acuity   Urgent              Means of arrival   Ambulance    Escorted by   [facility]    Service   Hospitalist    Admission type   Emergency              Arrival complaint   nausea             Chief Complaint   Patient presents with    Shortness of Breath     Per EMS patient recently discharged with aspiration pneumonia and finished her course of oral ABX, patient reports increasing SOB since. Patient also reports dizziness and nausea for the past 2 days.       Initial Presentation: 64 y.o. female with PMHx includes GERD, Schreiber's esophagus, hiatal hernia, microcytic anemia secondary to KEVIN, Rheumatoid arthritis, who presented on 9/27 to [facility] ED via EMS due to shortness of breath. Recently admitted from 9/16-9/20 for aspiration pneumonia.  She presents again with progressive dyspnea, yellow mucus, dizziness with lightheadedness. On exam, O2 sats drop to 88% on room air, supplemental O2 applied.  Labs revealed Na 134, BUN 4, Cr 0.58, total protein 6.3, VBG revealed pH 7.416. O2 91.3.  CXR revealed BL opacites.      Plan:  Admit Inpatient status to med surg  dt Aspiration pneumonia of left lower lobe:    Speech eval, IV cefepime, supplemental O2, fu on sputum cxs, give IVF, fu on BNP, Covid and flu. Continue home meds. IO, daily wts. CM following.     Anticipated Length of Stay/Certification Statement: Patient will be admitted on an inpatient basis with an anticipated length of stay of greater than 2 midnights secondary to SOB.     Date: 9/28   Day 2: No new complaints, remains on supplemental O2. Rales (bl bases) present. Repeat labs in AM, continue IV ABX for now, sputum cxs not obtained, DC IVF.     Date: 9/29  Day 3: Has surpassed a 2nd midnight with active treatments and services. Rales (Right lower lobe greater than left lower lobe crackles) present. Currently on room air satting 92%. She feels well but is very nervous based on her recent history of being in the ICU with aspiration pneumonia. Continue labs in AM, IV ABX, monitor VS.     ED Treatment-Medication Administration from 09/27/2024 1007 to 09/27/2024 1743         Date/Time Order Dose Route Action     09/27/2024 1011 droperidol (FOR EMS ONLY) (INAPSINE) 2.5 mg/mL injection 2.5 mg 0 mg Does not apply Given to EMS     09/27/2024 1326 potassium chloride (Klor-Con M20) CR tablet 40 mEq 40 mEq Oral Given     09/27/2024 1405 cefepime (MAXIPIME) 2 g/50 mL dextrose IVPB 2,000 mg Intravenous New Bag     09/27/2024 1541 buPROPion (WELLBUTRIN) tablet 75 mg 75 mg Oral Given     09/27/2024 1541 metoclopramide (REGLAN) tablet 5 mg 5 mg Oral Given     09/27/2024 1541 sucralfate (CARAFATE) tablet 1 g 1 g Oral Given     09/27/2024 1458 sodium chloride 0.9 % infusion 125 mL/hr Intravenous New Bag     09/27/2024 1458 guaiFENesin (MUCINEX) 12 hr tablet 600 mg 600 mg Oral Given            Scheduled Medications:  amLODIPine, 5 mg, Oral, Daily  buPROPion, 75 mg, Oral, TID  cefepime, 2,000 mg, Intravenous, Q8H  enoxaparin, 40 mg, Subcutaneous, Daily  guaiFENesin, 600 mg, Oral, Q12H YUMIKO  levothyroxine, 25 mcg, Oral, Early  Morning  metoclopramide, 10 mg, Oral, TID AC  pantoprazole, 40 mg, Oral, BID  PARoxetine, 60 mg, Oral, Daily  QUEtiapine, 400 mg, Oral, HS  sucralfate, 1 g, Oral, TID With Meals  ziprasidone, 80 mg, Oral, Daily      Continuous IV Infusions:     PRN Meds:  acetaminophen, 650 mg, Oral, Q6H PRN  benzonatate, 100 mg, Oral, TID PRN  influenza vaccine, 0.5 mL, Intramuscular, Prior to discharge  LORazepam, 2 mg, Oral, Q4H PRN      ED Triage Vitals [09/27/24 1012]   Temperature Pulse Respirations Blood Pressure SpO2 Pain Score   98.2 °F (36.8 °C) 91 18 142/79 94 % 9     Weight (last 2 days)       Date/Time Weight    09/30/24 0534 81.5 (179.68)    09/29/24 0600 81.3 (179.23)    09/28/24 1315 82.1 (181)    09/28/24 0600 82.2 (181.22)            Vital Signs (last 3 days)       Date/Time Temp Pulse Resp BP MAP (mmHg) SpO2 Calculated FIO2 (%) - Nasal Cannula Nasal Cannula O2 Flow Rate (L/min) O2 Device Patient Position - Orthostatic VS Pain    09/30/24 07:49:03 98.4 °F (36.9 °C) 80 18 126/74 91 93 % -- -- None (Room air) Lying --    09/29/24 2300 -- -- -- -- -- 90 % -- -- None (Room air) -- No Pain    09/29/24 21:20:18 98.1 °F (36.7 °C) 67 18 120/75 90 97 % -- -- None (Room air) Lying --    09/29/24 14:53:13 98.3 °F (36.8 °C) 71 17 123/77 92 97 % -- -- None (Room air) Lying --    09/29/24 0800 -- -- -- -- -- -- -- -- None (Room air) -- No Pain    09/29/24 07:33:03 98.3 °F (36.8 °C) 67 -- 127/79 95 98 % -- -- -- -- --    09/28/24 22:20:53 98 °F (36.7 °C) 68 18 129/80 96 97 % -- -- -- -- --    09/28/24 2100 -- -- -- -- -- -- -- -- -- -- No Pain    09/28/24 20:55:28 98 °F (36.7 °C) 72 18 131/80 97 99 % -- -- -- -- --    09/28/24 14:44:56 97.5 °F (36.4 °C) 72 -- 139/82 101 95 % -- -- -- -- --    09/28/24 1315 -- 78 -- 137/78 -- -- -- -- -- -- --    09/28/24 0800 -- -- -- -- -- -- -- -- -- -- No Pain    09/28/24 07:16:50 98 °F (36.7 °C) 85 19 137/78 98 100 % -- -- -- -- --    09/27/24 2100 -- -- -- -- -- -- -- -- -- -- No Pain     09/27/24 2046 -- -- -- -- -- -- 28 2 L/min Nasal cannula -- --    09/27/24 20:39:42 97.4 °F (36.3 °C) 66 16 110/70 83 88 % -- -- None (Room air) -- --    09/27/24 1806 -- -- -- -- -- 93 % -- -- None (Room air) -- No Pain    09/27/24 17:46:59 98 °F (36.7 °C) 86 16 132/79 97 95 % -- -- -- -- --    09/27/24 1315 -- 74 15 120/67 -- 95 % -- -- Nasal cannula Lying No Pain    09/27/24 1130 -- 88 -- -- -- 96 % 28 2 L/min Nasal cannula  -- --    09/27/24 1129 -- -- -- -- -- 92 % -- -- None (Room air) -- --    09/27/24 1012 98.2 °F (36.8 °C) 91 18 142/79 104 94 % -- -- None (Room air) Sitting 9              Pertinent Labs/Diagnostic Test Results:   Radiology:  CT chest w contrast   Final Interpretation by Kailey Cervantes MD (09/29 1301)      Left greater than right lung opacities most compatible with infection. May be secondary to aspiration, especially given chronic findings of esophagitis.      Subcentimeter right upper lobe nodular consolidation which is suspected infectious/inflammatory. Recommend follow-up in 3 months.            Workstation performed: [id number]         XR chest 1 view portable   ED Interpretation by Dionisio Gomez MD (09/27 1327)   Improving left-sided infiltrate relative to September 16, 2024 x-ray      Final Interpretation by Sabine Mejia MD (09/27 0863)      Patchy opacity in left midlung, likely representing pneumonia, decreased since 5/16/2024. Recommend follow-up PA and lateral chest radiographs 6-8 weeks after completion of antibiotic treatment to ensure resolution and exclude noninfectious airspace    disease.                  Workstation performed: [id number]           Cardiology:  ECG 12 lead    by Interface, Ris Results In (09/28 2052)      ECG 12 lead    by Interface, Ris Results In (09/28 2052)      Echo complete w/ contrast if indicated   Final Result by Jose Benitez MD (09/28 1370)        Left Ventricle: Left ventricular cavity size is normal. Wall thickness    is normal. The left  ventricular ejection fraction is 60%. Systolic    function is normal. Wall motion is normal. Diastolic function is normal.     Right Ventricle: Right ventricular cavity size is mildly dilated.    Systolic function is mildly reduced.     Left Atrium: The atrium is mildly dilated.     Mitral Valve: There is mild annular calcification. There is mild    regurgitation.     Tricuspid Valve: There is moderate regurgitation.         ECG 12 lead   Final Result by Francisco Weems DO (09/28 0738)     Age and gender specific ECG analysis     Normal sinus rhythm   Normal ECG   Confirmed by Francisco Weems ([id number]) on 9/28/2024 7:38:47 AM        GI:  No orders to display       Results from last 7 days   Lab Units 09/27/24  1511   SARS-COV-2  Negative     Results from last 7 days   Lab Units 09/30/24  0532 09/28/24  0503 09/27/24  1153   WBC Thousand/uL 2.81* 2.92* 10.13   HEMOGLOBIN g/dL 11.4* 11.0* 11.5   HEMATOCRIT % 35.9 33.9* 34.4*   PLATELETS Thousands/uL 247 304 256   TOTAL NEUT ABS Thousands/µL 1.78* 1.80* 9.22*         Results from last 7 days   Lab Units 09/30/24  0532 09/28/24  0503 09/27/24  1153   SODIUM mmol/L 136 137 134*   POTASSIUM mmol/L 3.9 5.0 3.3*   CHLORIDE mmol/L 101 100 97   CO2 mmol/L 29 29 28   ANION GAP mmol/L 6 8 9   BUN mg/dL 8 5 4*   CREATININE mg/dL 0.57* 0.60 0.58*   EGFR ml/min/1.73sq m 98 96 97   CALCIUM mg/dL 9.4 9.1 9.4     Results from last 7 days   Lab Units 09/30/24  0532 09/28/24  0503 09/27/24  1153   AST U/L 13 35 14   ALT U/L 12 15 12   ALK PHOS U/L 62 65 67   TOTAL PROTEIN g/dL 6.3* 6.4 6.3*   ALBUMIN g/dL 3.8 3.9 4.0   TOTAL BILIRUBIN mg/dL 0.34 0.35 0.38         Results from last 7 days   Lab Units 09/30/24  0532 09/28/24  0503 09/27/24  1153   GLUCOSE RANDOM mg/dL 91 79 89     Results from last 7 days   Lab Units 09/27/24  1153   PH JANY  7.416*   PCO2 JANY mm Hg 45.8   PO2 JANY mm Hg 91.3*   HCO3 JANY mmol/L 28.8   BASE EXC JANY mmol/L 3.6   O2 CONTENT JANY ml/dL 16.3   O2 HGB,  VENOUS % 95.5*     Results from last 7 days   Lab Units 09/27/24  1405 09/27/24  1153   HS TNI 0HR ng/L  --  3   HS TNI 2HR ng/L 3  --    HSTNI D2 ng/L 0  --      Results from last 7 days   Lab Units 09/30/24  0532 09/27/24  1153   PROCALCITONIN ng/ml <0.05 <0.05     Results from last 7 days   Lab Units 09/27/24  1153   BNP pg/mL 14     Results from last 7 days   Lab Units 09/27/24  1511   INFLUENZA A PCR  Negative   INFLUENZA B PCR  Negative   RSV PCR  Negative     Results from last 7 days   Lab Units 09/28/24  0754   SPUTUM CULTURE  2+ Growth of   GRAM STAIN RESULT  2+ Epithelial cells per low power field*  1+ Polys*  1+ Gram positive cocci in clusters*     Past Medical History:   Diagnosis Date    Anemia     Anxiety     Arthritis     Back pain at L4-L5 level     Schreiber esophagus     Bulimia     Colon polyp     Disease of thyroid gland     hypothyroid    GERD (gastroesophageal reflux disease)     Hearing loss in left ear     History of transfusion     Hyperlipidemia     Hypertension     Hypothyroidism     Leukopenia     NMS (neuroleptic malignant syndrome) 01/03/2020    Pneumonia     RA (rheumatoid arthritis) (HCC)     in feet    Sciatica     Seizure (HCC)     due to medication mix up 8/2018 only one historically    Skin spots, red     lower right arm - poss from cat- no s/s infection    Synovial cyst of lumbar spine     Vertigo     Wears dentures     full set    Weight gain      Present on Admission:   Aspiration pneumonia of left lower lobe (HCC)   Acquired hypothyroidism   Schreiber's esophagus   Primary hypertension   Severe iron deficiency anemia       Admitting Diagnosis: Nausea [R11.0]  Pneumonia [J18.9]  Hypoxia [R09.02]  Age/Sex: 64 y.o. female    Network Utilization Review Department  ATTENTION: Please call with any questions or concerns to [phone] and carefully listen to the prompts so that you are directed to the right person. All voicemails are confidential.   For Discharge needs, contact Care  Management DC Support Team at [phone] opt. [phone]  Send all requests for admission clinical reviews, approved or denied determinations and any other requests to dedicated fax number below belonging to the campus where the patient is receiving treatment. List of dedicated fax numbers for the Facilities:  FACILITY NAME UR FAX NUMBER   ADMISSION DENIALS (Administrative/Medical Necessity) [phone]   DISCHARGE SUPPORT TEAM (NETWORK) [phone]   PARENT CHILD HEALTH (Maternity/NICU/Pediatrics) [phone]   [facility] [phone]   [facility] [phone]   [facility] [phone]   [facility] [phone]   [facility] [phone]   [facility] [phone]   [facility] [phone]   [facility] [phone]   [facility] [phone]   [facility] [phone]   [facility] [phone]   [facility] [phone]

## 2024-09-30 NOTE — PROGRESS NOTES
Progress Note - Hospitalist   Name: Irene Plascencia 64 y.o. female I MRN: [id number]  Unit/Bed#: -01 I Date of Admission: 9/27/2024   Date of Service: 9/30/2024 I Hospital Day: 3    Assessment & Plan  Aspiration pneumonia of left lower lobe (HCC)  Patient recently admitted on 9/16-9/20 for aspiration pneumonia.  Completed course of antibiotics and sent home with 3 days of Keflex  Patient had improvement however had a relapse with increased cough, increased shortness of breath  No fever no chills  Procal negative  Denies orthopnea.  Does not appear markedly overloaded  Patient appears dry  Patient denies aspiration but does report could be from her esophageal condition  She is reluctant to stop the reglan for now (she was told to not take it with her geodon per her pharmacist. So she refused geodon . Now taking  wanted to remain on her oral reglan as she has concerns for aspiration  We discussed need to take her psych medications pt will call her doctor at discharge and follow up in regard to a wean if needed from her Geodon   Xray shows improved consolidation on the left, increased opacities on the right   CT chest w iv contrast 9/29: Left greater than right lung opacities most compatible with infection. May be secondary to aspiration, especially given chronic findings of esophagitis. Subcentimeter right upper lobe nodular consolidation which is suspected infectious/inflammatory. Recommend follow-up in 3 months.   Speech eval. Last eval in 2022 recommends  soft/level 3 diet and thin liquids . Will resume this for now  Cefipime iv however, procalcitonin is negative   Based on CT imaging we will continue iv abx for total of  5 days   Pt on day 4 of 5 and based on ct imaging and pt symptoms chose to continue broad spectrum for total 5 days with discharge 10/1  Sputum cultures with mixed leeanna   Normal saline discontinued 9/28/2024  BNP negative   Echocardiogram: EF of 60 % systolic function normal no  stenosis  COVID Flu negative  Acquired hypothyroidism  Continue levothyroxine  Schreiber's esophagus  Continue pantoprazole 40 mg BID  Sucralfate 3x daily with meals  Resumed on her reglan at her request   Primary hypertension  Continue amlodipine  Severe iron deficiency anemia   Patient has normal HGB. Needed venofer as outpatient  Continue to monitor  Leukopenia  Secondary to Severe Iron deficiency anemia , as evidenced by WBC of 2.92 --> 2.81   Continue to monitor   On iv Cefepime with plan for final dosing tomorrow     VTE Pharmacologic Prophylaxis: VTE Score: 4 High Risk (Score >/= 5) - Pharmacological DVT Prophylaxis Ordered: enoxaparin (Lovenox). Sequential Compression Devices Ordered.    Mobility:   Basic Mobility Inpatient Raw Score: 20  JH-HLM Goal: 6: Walk 10 steps or more  JH-HLM Achieved: 6: Walk 10 steps or more  JH-HLM Goal achieved. Continue to encourage appropriate mobility.    Patient Centered Rounds: I performed bedside rounds with nursing staff today.   Discussions with Specialists or Other Care Team Provider: nursing     Education and Discussions with Family / Patient: Patient declined call to .     Current Length of Stay: 3 day(s)  Current Patient Status: Inpatient   Certification Statement: The patient will continue to require additional inpatient hospital stay due to ongoing need for   Discharge Plan: Anticipate discharge tomorrow to home.    Code Status: Level 1 - Full Code    Subjective   Patient sitting in chair showing much improvement from Friday.  Now on room air she reports that when she woke up this morning her sats were much improved but still lower at this time.  She reports today she is feeling a lot better.  No nausea vomiting she does not feel like she is aspirating.  She is eating well and hydrating.    Objective     Vitals:   Temp (24hrs), Av.3 °F (36.8 °C), Min:98.1 °F (36.7 °C), Max:98.4 °F (36.9 °C)    Temp:  [98.1 °F (36.7 °C)-98.4 °F (36.9 °C)] 98.4 °F  (36.9 °C)  HR:  [67-80] 80  Resp:  [17-18] 18  BP: (120-126)/(74-77) 126/74  SpO2:  [90 %-97 %] 93 %  Body mass index is 32.86 kg/m².     Input and Output Summary (last 24 hours):     Intake/Output Summary (Last 24 hours) at 9/30/2024 1420  Last data filed at 9/30/2024 1011  Gross per 24 hour   Intake 544 ml   Output 0 ml   Net 544 ml       Physical Exam  Constitutional:       General: She is not in acute distress.     Appearance: She is not ill-appearing, toxic-appearing or diaphoretic.   HENT:      Head: Normocephalic and atraumatic.      Nose: No congestion.   Eyes:      General:         Right eye: No discharge.         Left eye: No discharge.   Cardiovascular:      Heart sounds: No murmur heard.     No friction rub. No gallop.   Pulmonary:      Effort: No respiratory distress.      Breath sounds: No stridor. Rales (right side) present. No wheezing or rhonchi.   Chest:      Chest wall: No tenderness.   Abdominal:      General: There is no distension.      Palpations: There is no mass.      Tenderness: There is no abdominal tenderness. There is no guarding or rebound.      Hernia: No hernia is present.   Musculoskeletal:         General: No swelling, tenderness, deformity or signs of injury.      Right lower leg: No edema.      Left lower leg: No edema.   Skin:     Coloration: Skin is not jaundiced or pale.      Findings: No bruising, erythema, lesion or rash.   Neurological:      Mental Status: She is alert and oriented to person, place, and time.          Lines/Drains:  Lines/Drains/Airways       Active Status       None                            Lab Results: I have reviewed the following results:   Results from last 7 days   Lab Units 09/30/24  0532   WBC Thousand/uL 2.81*   HEMOGLOBIN g/dL 11.4*   HEMATOCRIT % 35.9   PLATELETS Thousands/uL 247   SEGS PCT % 64   LYMPHO PCT % 19   MONO PCT % 10   EOS PCT % 6     Results from last 7 days   Lab Units 09/30/24  0532   SODIUM mmol/L 136   POTASSIUM mmol/L 3.9    CHLORIDE mmol/L 101   CO2 mmol/L 29   BUN mg/dL 8   CREATININE mg/dL 0.57*   ANION GAP mmol/L 6   CALCIUM mg/dL 9.4   ALBUMIN g/dL 3.8   TOTAL BILIRUBIN mg/dL 0.34   ALK PHOS U/L 62   ALT U/L 12   AST U/L 13   GLUCOSE RANDOM mg/dL 91                 Results from last 7 days   Lab Units 09/30/24  0532 09/27/24  1153   PROCALCITONIN ng/ml <0.05 <0.05       Recent Cultures (last 7 days):   Results from last 7 days   Lab Units 09/28/24  0754   SPUTUM CULTURE  2+ Growth of   GRAM STAIN RESULT  2+ Epithelial cells per low power field*  1+ Polys*  1+ Gram positive cocci in clusters*       Imaging Review: Reviewed radiology reports from this admission including: CT chest.  Other Studies: No additional pertinent studies reviewed.    Last 24 Hours Medication List:     Current Facility-Administered Medications:     acetaminophen (TYLENOL) tablet 650 mg, Q6H PRN    amLODIPine (NORVASC) tablet 5 mg, Daily    benzonatate (TESSALON PERLES) capsule 100 mg, TID PRN    buPROPion (WELLBUTRIN) tablet 75 mg, TID    cefepime (MAXIPIME) 2 g/50 mL dextrose IVPB, Q8H, Last Rate: Stopped (09/30/24 1011)    enoxaparin (LOVENOX) subcutaneous injection 40 mg, Daily    guaiFENesin (MUCINEX) 12 hr tablet 600 mg, Q12H YUMIKO    influenza vaccine (PF) (Fluarix) IM injection 0.5 mL, Prior to discharge    levothyroxine tablet 25 mcg, Early Morning    LORazepam (ATIVAN) tablet 2 mg, Q4H PRN    metoclopramide (REGLAN) tablet 10 mg, TID AC    pantoprazole (PROTONIX) EC tablet 40 mg, BID    PARoxetine (PAXIL) tablet 60 mg, Daily    QUEtiapine (SEROquel XR) 24 hr tablet 400 mg, HS    sucralfate (CARAFATE) tablet 1 g, TID With Meals    ziprasidone (GEODON) capsule 80 mg, Daily    Administrative Statements   Today, Patient Was Seen By: ABENA Griffin      **Please Note: This note may have been constructed using a voice recognition system.**

## 2024-09-30 NOTE — PLAN OF CARE
Problem: Prexisting or High Potential for Compromised Skin Integrity  Goal: Skin integrity is maintained or improved  Description: INTERVENTIONS:  - Identify patients at risk for skin breakdown  - Assess and monitor skin integrity  - Assess and monitor nutrition and hydration status  - Monitor labs   - Assess for incontinence   - Turn and reposition patient  - Assist with mobility/ambulation  - Relieve pressure over bony prominences  - Avoid friction and shearing  - Provide appropriate hygiene as needed including keeping skin clean and dry  - Evaluate need for skin moisturizer/barrier cream  - Collaborate with interdisciplinary team   - Patient/family teaching  - Consider wound care consult   Outcome: Progressing     Problem: PAIN - ADULT  Goal: Verbalizes/displays adequate comfort level or baseline comfort level  Description: Interventions:  - Encourage patient to monitor pain and request assistance  - Assess pain using appropriate pain scale  - Administer analgesics based on type and severity of pain and evaluate response  - Implement non-pharmacological measures as appropriate and evaluate response  - Consider cultural and social influences on pain and pain management  - Notify physician/advanced practitioner if interventions unsuccessful or patient reports new pain  Outcome: Progressing     Problem: INFECTION - ADULT  Goal: Absence or prevention of progression during hospitalization  Description: INTERVENTIONS:  - Assess and monitor for signs and symptoms of infection  - Monitor lab/diagnostic results  - Monitor all insertion sites, i.e. indwelling lines, tubes, and drains  - Monitor endotracheal if appropriate and nasal secretions for changes in amount and color  - [place] appropriate cooling/warming therapies per order  - Administer medications as ordered  - Instruct and encourage patient and family to use good hand hygiene technique  - Identify and instruct in appropriate isolation precautions for  identified infection/condition  Outcome: Progressing  Goal: Absence of fever/infection during neutropenic period  Description: INTERVENTIONS:  - Monitor WBC    Outcome: Progressing     Problem: SAFETY ADULT  Goal: Patient will remain free of falls  Description: INTERVENTIONS:  - Educate patient/family on patient safety including physical limitations  - Instruct patient to call for assistance with activity   - Consult OT/PT to assist with strengthening/mobility   - Keep Call bell within reach  - Keep bed low and locked with side rails adjusted as appropriate  - Keep care items and personal belongings within reach  - Initiate and maintain comfort rounds  - Make Fall Risk Sign visible to staff  - Offer Toileting every 2 Hours, in advance of need  - Initiate/Maintain bedalarm  - Obtain necessary fall risk management equipment  - Apply yellow socks and bracelet for high fall risk patients  - Consider moving patient to room near nurses station  Outcome: Progressing  Goal: Maintain or return to baseline ADL function  Description: INTERVENTIONS:  -  Assess patient's ability to carry out ADLs; assess patient's baseline for ADL function and identify physical deficits which impact ability to perform ADLs (bathing, care of mouth/teeth, toileting, grooming, dressing, etc.)  - Assess/evaluate cause of self-care deficits   - Assess range of motion  - Assess patient's mobility; develop plan if impaired  - Assess patient's need for assistive devices and provide as appropriate  - Encourage maximum independence but intervene and supervise when necessary  - Involve family in performance of ADLs  - Assess for home care needs following discharge   - Consider OT consult to assist with ADL evaluation and planning for discharge  - Provide patient education as appropriate  Outcome: Progressing  Goal: Maintains/Returns to pre admission functional level  Description: INTERVENTIONS:  - Perform AM-PAC 6 Click Basic Mobility/ Daily Activity  assessment daily.  - Set and communicate daily mobility goal to care team and patient/family/caregiver.   - Collaborate with rehabilitation services on mobility goals if consulted  - Perform Range of Motion 4 times a day.  - Reposition patient every 2 hours.  - Dangle patient 4 times a day  - Stand patient 3 times a day  - Ambulate patient 3 times a day  - Out of bed to chair 3 times a day   - Out of bed for meals 3 times a day  - Out of bed for toileting  - Record patient progress and toleration of activity level   Outcome: Progressing     Problem: DISCHARGE PLANNING  Goal: Discharge to home or other facility with appropriate resources  Description: INTERVENTIONS:  - Identify barriers to discharge w/patient and caregiver  - Arrange for needed discharge resources and transportation as appropriate  - Identify discharge learning needs (meds, wound care, etc.)  - Arrange for interpretive services to assist at discharge as needed  - Refer to Case Management Department for coordinating discharge planning if the patient needs post-hospital services based on physician/advanced practitioner order or complex needs related to functional status, cognitive ability, or social support system  Outcome: Progressing     Problem: Knowledge Deficit  Goal: Patient/family/caregiver demonstrates understanding of disease process, treatment plan, medications, and discharge instructions  Description: Complete learning assessment and assess knowledge base.  Interventions:  - Provide teaching at level of understanding  - Provide teaching via preferred learning methods  Outcome: Progressing

## 2024-09-30 NOTE — UTILIZATION REVIEW
"NOTIFICATION OF INPATIENT ADMISSION   AUTHORIZATION REQUEST   SERVICING FACILITY:   [facility]  Address: [address] [place] [address]  Tax ID: [id number]  NPI: [id number] ATTENDING PROVIDER:  Attending Name and NPI#: Noemi Giron Md [[id number]]  Address: [address] [place] [address]  Phone: [phone]   ADMISSION INFORMATION:  Place of Service: Inpatient [facility]  Place of Service Code: 21  Inpatient Admission Date/Time: 9/27/24  1:46 PM  Discharge Date/Time: No discharge date for patient encounter.  Admitting Diagnosis Code/Description:  Nausea [R11.0]  Pneumonia [J18.9]  Hypoxia [R09.02]     UTILIZATION REVIEW CONTACT:  Doris Phelps"Janae\" Domingo Utilization   Network Utilization Review Department  Phone: [phone]  Fax: [phone]  Email: [email or web address]  Contact for approvals/pending authorizations, clinical reviews, and discharge.     PHYSICIAN ADVISORY SERVICES:  Medical Necessity Denial & Fcsm-gt-Lavw Review  Phone: [phone]  Fax: [phone]  Email: [email or web address]     DISCHARGE SUPPORT TEAM:  For Patients Discharge Needs & Updates  Phone: [phone] opt. 2 Fax: [phone]  Email: [email or web address]     "

## 2024-09-30 NOTE — ASSESSMENT & PLAN NOTE
Secondary to Severe Iron deficiency anemia , as evidenced by WBC of 2.92 --> 2.81   Continue to monitor   On iv Cefepime with plan for final dosing tomorrow

## 2024-09-30 NOTE — ED PROVIDER NOTES
Final diagnoses:   Pneumonia   Hypoxia     ED Disposition       ED Disposition   Admit    Condition   Stable    Date/Time   Fri Sep 27, 2024  1:44 PM    Comment                  Assessment & Plan       Medical Decision Making  Amount and/or Complexity of Data Reviewed  Labs: ordered. Decision-making details documented in ED Course.  Radiology: ordered and independent interpretation performed.    Risk  Prescription drug management.  Decision regarding hospitalization.        ED Course as of 09/30/24 1344   Fri Sep 27, 2024   1130 Patient seen and evaluated by me  DDx: Recurrent pneumonia, atelectasis.  Less likely perforated viscus, but on differential due to vomiting.  Patient with no crepitus or chest wall tenderness to suggest esophageal perforation.  No physical exam findings to suggest PE.  Doubt ACS.  Workup and plan: CBC, CMP, troponin, BNP, EKG, chest x-ray, procalcitonin.  Patient desaturating to 88% on room air, improves with 2 L nasal cannula, will need admission if unable to wean oxygen.   1130 EKG done at 1018 interpreted by me   rate 91  rhythm normal sinus  axis normal  QRS complexes are normal  Intervals are normal  ST segments showing no ischemic changes     1209 CBC and differential(!)  WNL   1330 Chest x-ray showing improvement but not resolution of left lobe infiltrate on my interpretation.   1330 Comprehensive metabolic panel(!)  Potassium repleted, otherwise within normal limits.   1342 Given antibiotics, admitted to  IM for pneumonia with hypoxia.       Medications   amLODIPine (NORVASC) tablet 5 mg (has no administration in time range)   buPROPion (WELLBUTRIN) tablet 75 mg (75 mg Oral Given 9/27/24 1541)   levothyroxine tablet 25 mcg (has no administration in time range)   LORazepam (ATIVAN) tablet 2 mg (has no administration in time range)   pantoprazole (PROTONIX) EC tablet 40 mg (has no administration in time range)   PARoxetine (PAXIL) tablet 60 mg (has no administration in time range)    QUEtiapine (SEROquel XR) 24 hr tablet 400 mg (has no administration in time range)   sucralfate (CARAFATE) tablet 1 g (1 g Oral Given 9/27/24 1541)   ziprasidone (GEODON) capsule 80 mg (80 mg Oral Not Given 9/27/24 1602)   acetaminophen (TYLENOL) tablet 650 mg (has no administration in time range)   benzonatate (TESSALON PERLES) capsule 100 mg (has no administration in time range)   guaiFENesin (MUCINEX) 12 hr tablet 600 mg (600 mg Oral Given 9/27/24 1458)   enoxaparin (LOVENOX) subcutaneous injection 40 mg (40 mg Subcutaneous Not Given 9/27/24 1500)   cefepime (MAXIPIME) 2 g/50 mL dextrose IVPB (has no administration in time range)   droperidol (FOR EMS ONLY) (INAPSINE) 2.5 mg/mL injection 2.5 mg (0 mg Does not apply Given to EMS 9/27/24 1011)   potassium chloride (Klor-Con M20) CR tablet 40 mEq (40 mEq Oral Given 9/27/24 1326)   cefepime (MAXIPIME) 2 g/50 mL dextrose IVPB (0 mg Intravenous Stopped 9/27/24 1455)       ED Risk Strat Scores                                               History of Present Illness       Chief Complaint   Patient presents with    Shortness of Breath     Per EMS patient recently discharged with aspiration pneumonia and finished her course of oral ABX, patient reports increasing SOB since. Patient also reports dizziness and nausea for the past 2 days.       Past Medical History:   Diagnosis Date    Anemia     Anxiety     Arthritis     Back pain at L4-L5 level     Schreiber esophagus     Bulimia     Colon polyp     Disease of thyroid gland     hypothyroid    GERD (gastroesophageal reflux disease)     Hearing loss in left ear     History of transfusion     Hyperlipidemia     Hypertension     Hypothyroidism     Leukopenia     NMS (neuroleptic malignant syndrome) 01/03/2020    Pneumonia     RA (rheumatoid arthritis) (HCC)     in feet    Sciatica     Seizure (HCC)     due to medication mix up 8/2018 only one historically    Skin spots, red     lower right arm - poss from cat- no s/s infection     Synovial cyst of lumbar spine     Vertigo     Wears dentures     full set    Weight gain       Past Surgical History:   Procedure Laterality Date    BONE MARROW BIOPSY      BREAST SURGERY      enlargement with implants    CLOSED REDUCTION DISTAL FEMUR FRACTURE      COLONOSCOPY      COSMETIC SURGERY      DENTAL SURGERY      HIP ARTHROPLASTY Right 01/02/2020    Procedure: REVISION TOTAL HIP ARTHROPLASTY WITH REPAIR OF PARIPROSTHETIC FRACTURE - POSTERIOR APPROACH;  Surgeon: Dilan Pedersen MD;  Location: [facility];  Service: Orthopedics    WY AMPUTATION METATARSAL W/TOE SINGLE Left 04/07/2023    Procedure: AMPUTATION TOE 4th;  Surgeon: Conner Bartlett DPM;  Location:  MAIN OR;  Service: Podiatry    WY ARTHRP ACETBLR/PROX FEM PROSTC AGRFT/ALGRFT Right 12/11/2019    Procedure: ARTHROPLASTY HIP TOTAL ANTERIOR;  Surgeon: Dilan Pedersen MD;  Location: [facility];  Service: Orthopedics    WY ESOPHAGOGASTRODUODENOSCOPY TRANSORAL DIAGNOSTIC N/A 05/11/2021    Procedure: ESOPHAGOGASTRODUODENOSCOPY (EGD);  Surgeon: David Cedeño MD;  Location: [facility];  Service: General    WY LAPS RPR PARAESPHGL HRNA INCL FUNDPLSTY W/MESH N/A 05/11/2021    Procedure: REPAIR HERNIA PARAESOPHAGEAL  LAPAROSCOPIC;  Surgeon: David Cedeño MD;  Location: [facility];  Service: General    WY UNLISTED PROCEDURE ESOPHAGUS N/A 05/11/2021    Procedure: FUNDOPLICATION TRANSORAL INCISIONLESS;  Surgeon: Brad Cadet MD;  Location: [facility];  Service: Gastroenterology    RHINOPLASTY      SINUS SURGERY      TOE AMPUTATION Left     2023 4th toe    TRANSORAL INCISIONLESS FUNDOPLICATION (TIF)  05/11/2021      Family History   Problem Relation Age of Onset    Uterine cancer Mother     Esophageal cancer Father     Colon cancer Maternal Grandmother       Social History     Tobacco Use    Smoking status: Never     Passive exposure: Past    Smokeless tobacco: Never   Vaping Use    Vaping status: Never Used   Substance Use Topics    Alcohol use: Not  Currently    Drug use: Not Currently      E-Cigarette/Vaping    E-Cigarette Use Never User       E-Cigarette/Vaping Substances    Nicotine No     THC No     CBD No     Flavoring No     Other No     Unknown No       I have reviewed and agree with the history as documented.     Patient is a 64-year-old female, past medical history significant for hypertension, hypothyroidism, recent admission for pneumonia, presenting today for evaluation of shortness of breath.  Patient discharged on 9/20 with a prescription for cefdinir for pneumonia.  Patient states that she has completed this antibiotic just this past Sunday.  She states that on Sunday, she began to feel short of breath and coughing again.  Patient reports subjective chills/fevers, she reports a worsening cough, but denies any sputum production.  She denies any chest pain or pain elsewhere.  She states that she has additionally been experiencing nausea and vomiting over the past 2 days.      Shortness of Breath  Associated symptoms: cough    Associated symptoms: no abdominal pain, no chest pain, no fever, no headaches and no vomiting        Review of Systems   Constitutional:  Positive for chills. Negative for fatigue and fever.   HENT:  Negative for congestion.    Respiratory:  Positive for cough and shortness of breath. Negative for chest tightness.    Cardiovascular:  Negative for chest pain.   Gastrointestinal:  Negative for abdominal pain, diarrhea, nausea and vomiting.   Genitourinary:  Negative for difficulty urinating and dysuria.   Musculoskeletal:  Negative for gait problem.   Skin:  Negative for color change.   Neurological:  Negative for dizziness, syncope, weakness, numbness and headaches.           Objective       ED Triage Vitals [09/27/24 1012]   Temperature Pulse Blood Pressure Respirations SpO2 Patient Position - Orthostatic VS   98.2 °F (36.8 °C) 91 142/79 18 94 % Sitting      Temp Source Heart Rate Source BP Location FiO2 (%) Pain Score    Oral  Monitor Right arm -- 9      Vitals      Date and Time Temp Pulse SpO2 Resp BP Pain Score FACES Pain Rating User   09/30/24 0900 -- -- -- -- -- No Pain -- NB   09/30/24 0749 98.4 °F (36.9 °C) 80 93 % 18 126/74 -- -- DII   09/29/24 2300 -- -- 90 % -- -- No Pain -- NH   09/29/24 2120 -- -- -- 18 -- -- -- TL   09/29/24 2120 98.1 °F (36.7 °C) 67 97 % -- 120/75 -- -- DII   09/29/24 1453 -- -- -- 17 -- -- -- TL   09/29/24 1453 98.3 °F (36.8 °C) 71 97 % -- 123/77 -- -- DII   09/29/24 0800 -- -- -- -- -- No Pain -- TT   09/29/24 0733 98.3 °F (36.8 °C) 67 98 % -- 127/79 -- -- DII   09/28/24 2220 98 °F (36.7 °C) 68 97 % 18 129/80 -- -- DII   09/28/24 2100 -- -- -- -- -- No Pain -- GM   09/28/24 2055 98 °F (36.7 °C) 72 99 % 18 131/80 -- -- DII   09/28/24 1444 97.5 °F (36.4 °C) 72 95 % -- 139/82 -- -- DII   09/28/24 1315 -- 78 -- -- 137/78 -- -- ME   09/28/24 0800 -- -- -- -- -- No Pain -- TT   09/28/24 0716 98 °F (36.7 °C) 85 100 % 19 137/78 -- -- DII   09/27/24 2100 -- -- -- -- -- No Pain -- GM   09/27/24 2039 97.4 °F (36.3 °C) 66 88 % 16 110/70 -- -- DII   09/27/24 1806 -- -- 93 % -- -- No Pain -- NB   09/27/24 1746 98 °F (36.7 °C) 86 95 % 16 132/79 -- -- DII   09/27/24 1315 -- 74 95 % 15 120/67 No Pain -- TW   09/27/24 1130 -- 88 96 % -- -- -- -- TW   09/27/24 1129 -- -- 92 % -- -- -- -- TW   09/27/24 1012 98.2 °F (36.8 °C) 91 94 % 18 142/79 9 -- HB            Physical Exam  Vitals and nursing note reviewed.   Constitutional:       General: She is not in acute distress.     Appearance: Normal appearance. She is not ill-appearing or toxic-appearing.   HENT:      Head: Normocephalic and atraumatic.   Eyes:      General: No scleral icterus.     Extraocular Movements: Extraocular movements intact.   Cardiovascular:      Rate and Rhythm: Normal rate and regular rhythm.      Pulses: Normal pulses.      Heart sounds: Normal heart sounds. No murmur heard.  Pulmonary:      Effort: Pulmonary effort is normal. No respiratory distress.       Breath sounds: Decreased breath sounds and rhonchi (Throughout) present. No wheezing.   Abdominal:      General: Abdomen is flat. There is no distension.      Palpations: Abdomen is soft.      Tenderness: There is no abdominal tenderness. There is no guarding.   Musculoskeletal:         General: No swelling or tenderness. Normal range of motion.      Cervical back: Normal range of motion.      Right lower leg: No edema.      Left lower leg: No edema.   Skin:     General: Skin is warm.      Capillary Refill: Capillary refill takes less than 2 seconds.   Neurological:      General: No focal deficit present.      Mental Status: She is alert.      Cranial Nerves: No cranial nerve deficit.      Sensory: No sensory deficit.      Motor: No weakness.      Gait: Gait normal.   Psychiatric:         Mood and Affect: Mood normal.         Behavior: Behavior normal.         Results Reviewed       Procedure Component Value Units Date/Time    Sputum culture and Gram stain [[id number]]  (Abnormal) Collected: 09/28/24 0754    Lab Status: Final result Specimen: Expectorated Sputum Updated: 09/30/24 0827     Sputum Culture 2+ Growth of     Gram Stain Result 2+ Epithelial cells per low power field      1+ Polys      1+ Gram positive cocci in clusters    Comprehensive metabolic panel, AM Draw, Tomorrow [[id number]] Collected: 09/28/24 0503    Lab Status: Final result Specimen: Blood from Hand, Right Updated: 09/28/24 0647     Sodium 137 mmol/L      Potassium 5.0 mmol/L      Chloride 100 mmol/L      CO2 29 mmol/L      ANION GAP 8 mmol/L      BUN 5 mg/dL      Creatinine 0.60 mg/dL      Glucose 79 mg/dL      Calcium 9.1 mg/dL      AST 35 U/L      ALT 15 U/L      Alkaline Phosphatase 65 U/L      Total Protein 6.4 g/dL      Albumin 3.9 g/dL      Total Bilirubin 0.35 mg/dL      eGFR 96 ml/min/1.73sq m     Narrative:      National Kidney Disease Foundation guidelines for Chronic Kidney Disease (CKD):     Stage 1 with normal or high GFR (GFR  > 90 mL/min/1.73 square meters)    Stage 2 Mild CKD (GFR = 60-89 mL/min/1.73 square meters)    Stage 3A Moderate CKD (GFR = 45-59 mL/min/1.73 square meters)    Stage 3B Moderate CKD (GFR = 30-44 mL/min/1.73 square meters)    Stage 4 Severe CKD (GFR = 15-29 mL/min/1.73 square meters)    Stage 5 End Stage CKD (GFR <15 mL/min/1.73 square meters)  Note: GFR calculation is accurate only with a steady state creatinine    CBC and differential, AM Draw, Tomorrow [[id number]]  (Abnormal) Collected: 09/28/24 0503    Lab Status: Final result Specimen: Blood from Hand, Right Updated: 09/28/24 0622     WBC 2.92 Thousand/uL      RBC 3.91 Million/uL      Hemoglobin 11.0 g/dL      Hematocrit 33.9 %      MCV 87 fL      MCH 28.1 pg      MCHC 32.4 g/dL      RDW 16.4 %      MPV 10.1 fL      Platelets 304 Thousands/uL      nRBC 0 /100 WBCs      Segmented % 61 %      Immature Grans % 0 %      Lymphocytes % 21 %      Monocytes % 10 %      Eosinophils Relative 7 %      Basophils Relative 1 %      Absolute Neutrophils 1.80 Thousands/µL      Absolute Immature Grans 0.00 Thousand/uL      Absolute Lymphocytes 0.62 Thousands/µL      Absolute Monocytes 0.28 Thousand/µL      Eosinophils Absolute 0.19 Thousand/µL      Basophils Absolute 0.03 Thousands/µL     COVID/FLU/RSV [[id number]]  (Normal) Collected: 09/27/24 1511    Lab Status: Final result Specimen: Nares from Nose Updated: 09/27/24 1603     SARS-CoV-2 Negative     INFLUENZA A PCR Negative     INFLUENZA B PCR Negative     RSV PCR Negative    Narrative:      This test has been performed using the CoV-2/Flu/RSV plus assay on the [employer] GeneXpert platform. This test has been validated by the  and verified by the performing laboratory.     This test is designed to amplify and detect the following: nucleocapsid (N), envelope (E), and RNA-dependent RNA polymerase (RdRP) genes of the SARS-CoV-2 genome; matrix (M), basic polymerase (PB2), and acidic protein (PA) segments of the influenza  A genome; matrix (M) and non-structural protein (NS) segments of the influenza B genome, and the nucleocapsid genes of RSV A and RSV B.     Positive results are indicative of the presence of Flu A, Flu B, RSV, and/or SARS-CoV-2 RNA. Positive results for SARS-CoV-2 or suspected novel influenza should be reported to state, local, or federal health departments according to local reporting requirements.      All results should be assessed in conjunction with clinical presentation and other laboratory markers for clinical management.     FOR PEDIATRIC PATIENTS - copy/paste COVID Guidelines URL to browser: [email or web address]       B-Type Natriuretic Peptide(BNP) [[id number]]  (Normal) Collected: 09/27/24 1153    Lab Status: Final result Specimen: Blood from Hand, Left Updated: 09/27/24 1509     BNP 14 pg/mL     HS Troponin I 2hr [[id number]]  (Normal) Collected: 09/27/24 1405    Lab Status: Final result Specimen: Blood from Hand, Left Updated: 09/27/24 1446     hs TnI 2hr 3 ng/L      Delta 2hr hsTnI 0 ng/L     Procalcitonin [[id number]]  (Normal) Collected: 09/27/24 1153    Lab Status: Final result Specimen: Blood from Hand, Left Updated: 09/27/24 1404     Procalcitonin <0.05 ng/ml     HS Troponin 0hr (reflex protocol) [[id number]]  (Normal) Collected: 09/27/24 1153    Lab Status: Final result Specimen: Blood from Arm, Left Updated: 09/27/24 1245     hs TnI 0hr 3 ng/L     CBC and differential [[id number]]  (Abnormal) Collected: 09/27/24 1153    Lab Status: Final result Specimen: Blood from Hand, Left Updated: 09/27/24 1230     WBC 10.13 Thousand/uL      RBC 4.08 Million/uL      Hemoglobin 11.5 g/dL      Hematocrit 34.4 %      MCV 84 fL      MCH 28.2 pg      MCHC 33.4 g/dL      RDW 15.7 %      MPV 8.3 fL      Platelets 256 Thousands/uL      nRBC 0 /100 WBCs      Segmented % 91 %      Immature Grans % 0 %      Lymphocytes % 4 %      Monocytes % 3 %      Eosinophils Relative  2 %      Basophils Relative 0 %      Absolute Neutrophils 9.22 Thousands/µL      Absolute Immature Grans 0.03 Thousand/uL      Absolute Lymphocytes 0.38 Thousands/µL      Absolute Monocytes 0.32 Thousand/µL      Eosinophils Absolute 0.16 Thousand/µL      Basophils Absolute 0.02 Thousands/µL     Narrative:      This is an appended report.  These results have been appended to a previously verified report.    Comprehensive metabolic panel [[id number]]  (Abnormal) Collected: 09/27/24 1153    Lab Status: Final result Specimen: Blood from Hand, Left Updated: 09/27/24 1226     Sodium 134 mmol/L      Potassium 3.3 mmol/L      Chloride 97 mmol/L      CO2 28 mmol/L      ANION GAP 9 mmol/L      BUN 4 mg/dL      Creatinine 0.58 mg/dL      Glucose 89 mg/dL      Calcium 9.4 mg/dL      AST 14 U/L      ALT 12 U/L      Alkaline Phosphatase 67 U/L      Total Protein 6.3 g/dL      Albumin 4.0 g/dL      Total Bilirubin 0.38 mg/dL      eGFR 97 ml/min/1.73sq m     Narrative:      National Kidney Disease Foundation guidelines for Chronic Kidney Disease (CKD):     Stage 1 with normal or high GFR (GFR > 90 mL/min/1.73 square meters)    Stage 2 Mild CKD (GFR = 60-89 mL/min/1.73 square meters)    Stage 3A Moderate CKD (GFR = 45-59 mL/min/1.73 square meters)    Stage 3B Moderate CKD (GFR = 30-44 mL/min/1.73 square meters)    Stage 4 Severe CKD (GFR = 15-29 mL/min/1.73 square meters)    Stage 5 End Stage CKD (GFR <15 mL/min/1.73 square meters)  Note: GFR calculation is accurate only with a steady state creatinine    Blood gas, venous [[id number]]  (Abnormal) Collected: 09/27/24 1153    Lab Status: Final result Specimen: Blood from Arm, Left Updated: 09/27/24 1204     pH, Andrew 7.416     pCO2, Andrew 45.8 mm Hg      pO2, Andrew 91.3 mm Hg      HCO3, Andrew 28.8 mmol/L      Base Excess, Andrew 3.6 mmol/L      O2 Content, Andrew 16.3 ml/dL      O2 HGB, VENOUS 95.5 %             CT chest w contrast   Final Interpretation by Kailey Cervantes MD (09/29 1301)      Left  greater than right lung opacities most compatible with infection. May be secondary to aspiration, especially given chronic findings of esophagitis.      Subcentimeter right upper lobe nodular consolidation which is suspected infectious/inflammatory. Recommend follow-up in 3 months.            Workstation performed: [id number]         XR chest 1 view portable   ED Interpretation by Dionisio Gomez MD (09/27 1327)   Improving left-sided infiltrate relative to September 16, 2024 x-ray      Final Interpretation by Sabine Mejia MD (09/27 1255)      Patchy opacity in left midlung, likely representing pneumonia, decreased since 5/16/2024. Recommend follow-up PA and lateral chest radiographs 6-8 weeks after completion of antibiotic treatment to ensure resolution and exclude noninfectious airspace    disease.                  Workstation performed: [id number]             Procedures    ED Medication and Procedure Management   Prior to Admission Medications   Prescriptions Last Dose Informant Patient Reported? Taking?   LORazepam (ATIVAN) 2 mg tablet 9/26/2024  No Yes   Sig: Take 1 tablet (2 mg total) by mouth every 4 (four) hours as needed for anxiety   PARoxetine (PAXIL) 30 mg tablet 9/26/2024  No Yes   Sig: TAKE 2 TABLETS (60 MG TOTAL) BY MOUTH IN THE MORNING   QUEtiapine (SEROquel XR) 400 mg 24 hr tablet 9/26/2024 Self No Yes   Sig: TAKE 1 TABLET (400 MG TOTAL) BY MOUTH DAILY AT BEDTIME   amLODIPine (NORVASC) 5 mg tablet 9/26/2024 Self No Yes   Sig: TAKE 1 TABLET (5 MG TOTAL) BY MOUTH DAILY.   buPROPion (WELLBUTRIN) 75 mg tablet Not Taking  Yes No   Sig: Take 75 mg by mouth 3 (three) times a day   Patient not taking: Reported on 9/27/2024   levothyroxine 25 mcg tablet 9/26/2024 Self No Yes   Sig: TAKE 1 TABLET BY MOUTH EVERY DAY   metoclopramide (REGLAN) 5 mg tablet 9/26/2024  No Yes   Sig: Take 1 tablet (5 mg total) by mouth 3 (three) times a day before meals   ondansetron (ZOFRAN) 4 mg tablet Past Week  No Yes   Sig:  Take 1 tablet (4 mg total) by mouth every 8 (eight) hours as needed for nausea or vomiting   pantoprazole (PROTONIX) 40 mg tablet 9/27/2024  No Yes   Sig: TAKE 1 TABLET BY MOUTH TWICE A DAY   sodium chloride 1 g tablet  Self No No   Sig: Take 2 tablets (2 g total) by mouth 2 (two) times a day   sucralfate (CARAFATE) 1 g tablet 9/26/2024  No Yes   Sig: TAKE 1 TABLET (1 G TOTAL) BY MOUTH 3 (THREE) TIMES A DAY WITH MEALS   ziprasidone (GEODON) 80 mg capsule Not Taking Self No No   Sig: TAKE 1 CAPSULE BY MOUTH EVERY DAY   Patient not taking: Reported on 9/27/2024      Facility-Administered Medications: None     Current Discharge Medication List        CONTINUE these medications which have NOT CHANGED    Details   amLODIPine (NORVASC) 5 mg tablet TAKE 1 TABLET (5 MG TOTAL) BY MOUTH DAILY.  Qty: 90 tablet, Refills: 3    Associated Diagnoses: Hypertension      levothyroxine 25 mcg tablet TAKE 1 TABLET BY MOUTH EVERY DAY  Qty: 90 tablet, Refills: 3    Associated Diagnoses: Acquired hypothyroidism      LORazepam (ATIVAN) 2 mg tablet Take 1 tablet (2 mg total) by mouth every 4 (four) hours as needed for anxiety  Qty: 180 tablet, Refills: 1    Comments: Short term higher dosing  Associated Diagnoses: Anxiety      metoclopramide (REGLAN) 5 mg tablet Take 1 tablet (5 mg total) by mouth 3 (three) times a day before meals  Qty: 90 tablet, Refills: 1    Associated Diagnoses: Gastroparesis      ondansetron (ZOFRAN) 4 mg tablet Take 1 tablet (4 mg total) by mouth every 8 (eight) hours as needed for nausea or vomiting  Qty: 30 tablet, Refills: 5    Associated Diagnoses: Nausea and vomiting, unspecified vomiting type      pantoprazole (PROTONIX) 40 mg tablet TAKE 1 TABLET BY MOUTH TWICE A DAY  Qty: 180 tablet, Refills: 1    Associated Diagnoses: Erosive esophagitis      PARoxetine (PAXIL) 30 mg tablet TAKE 2 TABLETS (60 MG TOTAL) BY MOUTH IN THE MORNING  Qty: 180 tablet, Refills: 1    Associated Diagnoses: Anxiety      QUEtiapine  (SEROquel XR) 400 mg 24 hr tablet TAKE 1 TABLET (400 MG TOTAL) BY MOUTH DAILY AT BEDTIME  Qty: 90 tablet, Refills: 3    Associated Diagnoses: Schizoaffective disorder, depressive type (HCC); Psychiatric disorder      sucralfate (CARAFATE) 1 g tablet TAKE 1 TABLET (1 G TOTAL) BY MOUTH 3 (THREE) TIMES A DAY WITH MEALS  Qty: 270 tablet, Refills: 1    Associated Diagnoses: Erosive esophagitis      buPROPion (WELLBUTRIN) 75 mg tablet Take 75 mg by mouth 3 (three) times a day      sodium chloride 1 g tablet Take 2 tablets (2 g total) by mouth 2 (two) times a day  Qty: 120 tablet, Refills: 0    Associated Diagnoses: Hyponatremia      ziprasidone (GEODON) 80 mg capsule TAKE 1 CAPSULE BY MOUTH EVERY DAY  Qty: 90 capsule, Refills: 3    Associated Diagnoses: ABIMAEL (generalized anxiety disorder)           No discharge procedures on file.  ED SEPSIS DOCUMENTATION   Time reflects when diagnosis was documented in both MDM as applicable and the Disposition within this note       Time User Action Codes Description Comment    9/27/2024  1:44 PM Jaimee Palomino [J18.9] Pneumonia     9/27/2024  1:44 PM Jaimee Palomino [R09.02] Hypoxia                  Jaimee Palomino MD  09/30/24 1342

## 2024-09-30 NOTE — PLAN OF CARE
Problem: Prexisting or High Potential for Compromised Skin Integrity  Goal: Skin integrity is maintained or improved  Description: INTERVENTIONS:  - Identify patients at risk for skin breakdown  - Assess and monitor skin integrity  - Assess and monitor nutrition and hydration status  - Monitor labs   - Assess for incontinence   - Turn and reposition patient  - Assist with mobility/ambulation  - Relieve pressure over bony prominences  - Avoid friction and shearing  - Provide appropriate hygiene as needed including keeping skin clean and dry  - Evaluate need for skin moisturizer/barrier cream  - Collaborate with interdisciplinary team   - Patient/family teaching  - Consider wound care consult   Outcome: Progressing     Problem: PAIN - ADULT  Goal: Verbalizes/displays adequate comfort level or baseline comfort level  Description: Interventions:  - Encourage patient to monitor pain and request assistance  - Assess pain using appropriate pain scale  - Administer analgesics based on type and severity of pain and evaluate response  - Implement non-pharmacological measures as appropriate and evaluate response  - Consider cultural and social influences on pain and pain management  - Notify physician/advanced practitioner if interventions unsuccessful or patient reports new pain  Outcome: Progressing     Problem: INFECTION - ADULT  Goal: Absence or prevention of progression during hospitalization  Description: INTERVENTIONS:  - Assess and monitor for signs and symptoms of infection  - Monitor lab/diagnostic results  - Monitor all insertion sites, i.e. indwelling lines, tubes, and drains  - Monitor endotracheal if appropriate and nasal secretions for changes in amount and color  - [place] appropriate cooling/warming therapies per order  - Administer medications as ordered  - Instruct and encourage patient and family to use good hand hygiene technique  - Identify and instruct in appropriate isolation precautions for  identified infection/condition  Outcome: Progressing  Goal: Absence of fever/infection during neutropenic period  Description: INTERVENTIONS:  - Monitor WBC    Outcome: Progressing     Problem: SAFETY ADULT  Goal: Patient will remain free of falls  Description: INTERVENTIONS:  - Educate patient/family on patient safety including physical limitations  - Instruct patient to call for assistance with activity   - Consult OT/PT to assist with strengthening/mobility   - Keep Call bell within reach  - Keep bed low and locked with side rails adjusted as appropriate  - Keep care items and personal belongings within reach  - Initiate and maintain comfort rounds  - Make Fall Risk Sign visible to staff  - Offer Toileting every *** Hours, in advance of need  - Initiate/Maintain ***alarm  - Obtain necessary fall risk management equipment: ***  - Apply yellow socks and bracelet for high fall risk patients  - Consider moving patient to room near nurses station  Outcome: Progressing  Goal: Maintain or return to baseline ADL function  Description: INTERVENTIONS:  -  Assess patient's ability to carry out ADLs; assess patient's baseline for ADL function and identify physical deficits which impact ability to perform ADLs (bathing, care of mouth/teeth, toileting, grooming, dressing, etc.)  - Assess/evaluate cause of self-care deficits   - Assess range of motion  - Assess patient's mobility; develop plan if impaired  - Assess patient's need for assistive devices and provide as appropriate  - Encourage maximum independence but intervene and supervise when necessary  - Involve family in performance of ADLs  - Assess for home care needs following discharge   - Consider OT consult to assist with ADL evaluation and planning for discharge  - Provide patient education as appropriate  Outcome: Progressing  Goal: Maintains/Returns to pre admission functional level  Description: INTERVENTIONS:  - Perform AM-PAC 6 Click Basic Mobility/ Daily  Activity assessment daily.  - Set and communicate daily mobility goal to care team and patient/family/caregiver.   - Collaborate with rehabilitation services on mobility goals if consulted  - Perform Range of Motion *** times a day.  - Reposition patient every *** hours.  - Dangle patient *** times a day  - Stand patient *** times a day  - Ambulate patient *** times a day  - Out of bed to chair *** times a day   - Out of bed for meals *** times a day  - Out of bed for toileting  - Record patient progress and toleration of activity level   Outcome: Progressing     Problem: DISCHARGE PLANNING  Goal: Discharge to home or other facility with appropriate resources  Description: INTERVENTIONS:  - Identify barriers to discharge w/patient and caregiver  - Arrange for needed discharge resources and transportation as appropriate  - Identify discharge learning needs (meds, wound care, etc.)  - Arrange for interpretive services to assist at discharge as needed  - Refer to Case Management Department for coordinating discharge planning if the patient needs post-hospital services based on physician/advanced practitioner order or complex needs related to functional status, cognitive ability, or social support system  Outcome: Progressing     Problem: Knowledge Deficit  Goal: Patient/family/caregiver demonstrates understanding of disease process, treatment plan, medications, and discharge instructions  Description: Complete learning assessment and assess knowledge base.  Interventions:  - Provide teaching at level of understanding  - Provide teaching via preferred learning methods  Outcome: Progressing

## 2024-09-30 NOTE — ASSESSMENT & PLAN NOTE
Continue pantoprazole 40 mg BID  Sucralfate 3x daily with meals  Resumed on her reglan at her request

## 2024-10-01 VITALS
SYSTOLIC BLOOD PRESSURE: 117 MMHG | BODY MASS INDEX: 32.86 KG/M2 | RESPIRATION RATE: 16 BRPM | WEIGHT: 178.57 LBS | OXYGEN SATURATION: 92 % | TEMPERATURE: 98.2 F | HEART RATE: 73 BPM | HEIGHT: 62 IN | DIASTOLIC BLOOD PRESSURE: 66 MMHG

## 2024-10-01 LAB
BASOPHILS # BLD AUTO: 0.03 THOUSANDS/ÂΜL (ref 0–0.1)
BASOPHILS NFR BLD AUTO: 1 % (ref 0–1)
EOSINOPHIL # BLD AUTO: 0.17 THOUSAND/ÂΜL (ref 0–0.61)
EOSINOPHIL NFR BLD AUTO: 7 % (ref 0–6)
ERYTHROCYTE [DISTWIDTH] IN BLOOD BY AUTOMATED COUNT: 15.8 % (ref 11.6–15.1)
HCT VFR BLD AUTO: 36.8 % (ref 34.8–46.1)
HGB BLD-MCNC: 11.7 G/DL (ref 11.5–15.4)
IMM GRANULOCYTES # BLD AUTO: 0.01 THOUSAND/UL (ref 0–0.2)
IMM GRANULOCYTES NFR BLD AUTO: 0 % (ref 0–2)
LYMPHOCYTES # BLD AUTO: 0.47 THOUSANDS/ÂΜL (ref 0.6–4.47)
LYMPHOCYTES NFR BLD AUTO: 18 % (ref 14–44)
MCH RBC QN AUTO: 27.6 PG (ref 26.8–34.3)
MCHC RBC AUTO-ENTMCNC: 31.8 G/DL (ref 31.4–37.4)
MCV RBC AUTO: 87 FL (ref 82–98)
MONOCYTES # BLD AUTO: 0.28 THOUSAND/ÂΜL (ref 0.17–1.22)
MONOCYTES NFR BLD AUTO: 11 % (ref 4–12)
NEUTROPHILS # BLD AUTO: 1.59 THOUSANDS/ÂΜL (ref 1.85–7.62)
NEUTS SEG NFR BLD AUTO: 63 % (ref 43–75)
NRBC BLD AUTO-RTO: 0 /100 WBCS
PLATELET # BLD AUTO: 267 THOUSANDS/UL (ref 149–390)
PMV BLD AUTO: 8.3 FL (ref 8.9–12.7)
RBC # BLD AUTO: 4.24 MILLION/UL (ref 3.81–5.12)
WBC # BLD AUTO: 2.55 THOUSAND/UL (ref 4.31–10.16)

## 2024-10-01 PROCEDURE — 99239 HOSP IP/OBS DSCHRG MGMT >30: CPT | Performed by: INTERNAL MEDICINE

## 2024-10-01 PROCEDURE — 85025 COMPLETE CBC W/AUTO DIFF WBC: CPT | Performed by: NURSE PRACTITIONER

## 2024-10-01 RX ORDER — METOCLOPRAMIDE 5 MG/1
10 TABLET ORAL
Start: 2024-10-01

## 2024-10-01 RX ADMIN — CEFEPIME 2000 MG: 2 INJECTION, POWDER, FOR SOLUTION INTRAVENOUS at 09:28

## 2024-10-01 RX ADMIN — AMLODIPINE BESYLATE 5 MG: 5 TABLET ORAL at 09:21

## 2024-10-01 RX ADMIN — BUPROPION HYDROCHLORIDE 75 MG: 75 TABLET, FILM COATED ORAL at 09:22

## 2024-10-01 RX ADMIN — PAROXETINE HYDROCHLORIDE 60 MG: 20 TABLET, FILM COATED ORAL at 09:22

## 2024-10-01 RX ADMIN — GUAIFENESIN 600 MG: 600 TABLET, EXTENDED RELEASE ORAL at 09:21

## 2024-10-01 RX ADMIN — PANTOPRAZOLE SODIUM 40 MG: 40 TABLET, DELAYED RELEASE ORAL at 09:23

## 2024-10-01 RX ADMIN — ENOXAPARIN SODIUM 40 MG: 40 INJECTION SUBCUTANEOUS at 09:21

## 2024-10-01 RX ADMIN — ZIPRASIDONE HYDROCHLORIDE 80 MG: 40 CAPSULE ORAL at 09:21

## 2024-10-01 RX ADMIN — SUCRALFATE 1 G: 1 TABLET ORAL at 09:21

## 2024-10-01 RX ADMIN — LEVOTHYROXINE SODIUM 25 MCG: 25 TABLET ORAL at 06:14

## 2024-10-01 RX ADMIN — METOCLOPRAMIDE 10 MG: 10 TABLET ORAL at 09:22

## 2024-10-01 RX ADMIN — CEFEPIME 2000 MG: 2 INJECTION, POWDER, FOR SOLUTION INTRAVENOUS at 01:07

## 2024-10-01 NOTE — PLAN OF CARE
Problem: Prexisting or High Potential for Compromised Skin Integrity  Goal: Skin integrity is maintained or improved  Description: INTERVENTIONS:  - Identify patients at risk for skin breakdown  - Assess and monitor skin integrity  - Assess and monitor nutrition and hydration status  - Monitor labs   - Assess for incontinence   - Turn and reposition patient  - Assist with mobility/ambulation  - Relieve pressure over bony prominences  - Avoid friction and shearing  - Provide appropriate hygiene as needed including keeping skin clean and dry  - Evaluate need for skin moisturizer/barrier cream  - Collaborate with interdisciplinary team   - Patient/family teaching  - Consider wound care consult   Outcome: Progressing     Problem: PAIN - ADULT  Goal: Verbalizes/displays adequate comfort level or baseline comfort level  Description: Interventions:  - Encourage patient to monitor pain and request assistance  - Assess pain using appropriate pain scale  - Administer analgesics based on type and severity of pain and evaluate response  - Implement non-pharmacological measures as appropriate and evaluate response  - Consider cultural and social influences on pain and pain management  - Notify physician/advanced practitioner if interventions unsuccessful or patient reports new pain  Outcome: Progressing     Problem: SAFETY ADULT  Goal: Patient will remain free of falls  Description: INTERVENTIONS:  - Educate patient/family on patient safety including physical limitations  - Instruct patient to call for assistance with activity   - Consult OT/PT to assist with strengthening/mobility   - Keep Call bell within reach  - Keep bed low and locked with side rails adjusted as appropriate  - Keep care items and personal belongings within reach  - Initiate and maintain comfort rounds  - Make Fall Risk Sign visible to staff  - Apply yellow socks and bracelet for high fall risk patients  - Consider moving patient to room near nurses  station  Outcome: Progressing     Problem: DISCHARGE PLANNING  Goal: Discharge to home or other facility with appropriate resources  Description: INTERVENTIONS:  - Identify barriers to discharge w/patient and caregiver  - Arrange for needed discharge resources and transportation as appropriate  - Identify discharge learning needs (meds, wound care, etc.)  - Arrange for interpretive services to assist at discharge as needed  - Refer to Case Management Department for coordinating discharge planning if the patient needs post-hospital services based on physician/advanced practitioner order or complex needs related to functional status, cognitive ability, or social support system  Outcome: Progressing     Problem: Knowledge Deficit  Goal: Patient/family/caregiver demonstrates understanding of disease process, treatment plan, medications, and discharge instructions  Description: Complete learning assessment and assess knowledge base.  Interventions:  - Provide teaching at level of understanding  - Provide teaching via preferred learning methods  Outcome: Progressing

## 2024-10-01 NOTE — UTILIZATION REVIEW
NOTIFICATION OF ADMISSION DISCHARGE   This is a Notification of Discharge from [facility]. Please be advised that this patient has been discharge from our facility. Below you will find the admission and discharge date and time including the patient’s disposition.   UTILIZATION REVIEW CONTACT:  Doris Casey)  Utilization   Network Utilization Review Department  Phone: [phone] x carefully listen to the prompts. All voicemails are confidential.  Email: [email or web address]     ADMISSION INFORMATION  PRESENTATION DATE: 9/27/2024 10:07 AM  OBERVATION ADMISSION DATE: N/A  INPATIENT ADMISSION DATE: 9/27/24  1:46 PM   DISCHARGE DATE: 10/1/2024  2:44 PM   DISPOSITION:Home/Self Care    Network Utilization Review Department  ATTENTION: Please call with any questions or concerns to [phone] and carefully listen to the prompts so that you are directed to the right person. All voicemails are confidential.   For Discharge needs, contact Care Management DC Support Team at [phone] opt. 2  Send all requests for admission clinical reviews, approved or denied determinations and any other requests to dedicated fax number below belonging to the campus where the patient is receiving treatment. List of dedicated fax numbers for the Facilities:  FACILITY NAME UR FAX NUMBER   ADMISSION DENIALS (Administrative/Medical Necessity) [phone]   DISCHARGE SUPPORT TEAM ([facility]) [phone]   PARENT CHILD HEALTH (Maternity/NICU/Pediatrics) [phone]   [facility] [phone]   [facility] [phone]   [facility] [phone]   [facility] [phone]   [facility] [phone]   [facility] [phone]   [facility] [phone]   [facility]  [phone]   [facility] [phone]   [facility] [phone]   [facility] [phone]   [facility] [phone]

## 2024-10-01 NOTE — CASE MANAGEMENT
Case Management Discharge Planning Note    Patient name Irene Plascencia  Location /-01 MRN [id number]  : 1960 Date 10/1/2024       Current Admission Date: 2024  Current Admission Diagnosis:Aspiration pneumonia of left lower lobe (HCC)   Patient Active Problem List    Diagnosis Date Noted Date Diagnosed    Leukopenia 2024     Aspiration pneumonia (HCC) 2024     Heartburn 2024     Gastroparesis 2024     Acute cough 2024     Elevated vitamin B12 level 2024     Iron deficiency 2024     Acquired absence of other toe(s), unspecified side ([facility]) 2024     Osteomyelitis, unspecified site, unspecified type ([facility]) 2024     Vitamin B12 deficiency (dietary) anemia 2023     Folate deficiency 2023     Neutropenia ([facility]) 2023     Post concussion syndrome 2023     Sepsis without acute organ dysfunction ([facility]) 2023     Acute respiratory failure with hypoxia ([facility]) 2023     Headache 2023     Nutritional anemia 2023     Stress incontinence 2023     Ulcer of toe, chronic, left, with unspecified severity ([facility]) 2023     Acute encephalopathy 2022     Thrombocytosis 2022     Abdominal pain 2022     Schreiber's esophagus 2022     Microcytic anemia 2022     Self neglect 2022     Pruritus 2022     Upper GI bleed 01/10/2022     Nausea and vomiting 10/22/2021     S/P tooth extraction 10/22/2021     SIRS (systemic inflammatory response syndrome) ([facility]) 2021     Hyperlipidemia 2021     Word finding difficulty 2021     Hiatal hernia with GERD without esophagitis 2021     Polyp of colon 2021     Melena 2020     Cellulitis of left upper extremity 2020     Erosive esophagitis 2020     Rash 2020     Iron deficiency anemia 2020     Multiple excoriations 2020     Fall 2020     Esophagitis 2020      Hyponatremia 01/27/2020     Problem related to living arrangement 01/27/2020     Hypokalemia      NMS (neuroleptic malignant syndrome) 01/03/2020     Aspiration pneumonia of left lower lobe (HCC) 01/02/2020     Preop exam for internal medicine 11/18/2019     Severe iron deficiency anemia  11/14/2019     Chronic bilateral low back pain without sciatica 11/01/2019     Right hip pain 07/15/2019     Primary hypertension 06/12/2019     Dermal hypersensitivity reaction 11/08/2018     Psychiatric disorder 08/21/2018     Schizoaffective disorder (HCC) 08/16/2018     Serotonin syndrome 08/13/2018     Other fatigue 06/19/2018     Spinal stenosis of lumbar region with neurogenic claudication 06/15/2018     Lumbar spondylosis 06/15/2018     T12 compression fracture (HCC) 06/15/2018     Synovial cyst of lumbar facet joint 06/15/2018     Localized osteoporosis with current pathological fracture with routine healing 05/25/2018     Lumbar radiculopathy 03/19/2018     DDD (degenerative disc disease), lumbar 03/19/2018     Spondylolisthesis at L4-L5 level 03/19/2018     Anxiety 10/31/2016     Acquired hypothyroidism 10/31/2016     Constipation 04/10/2014     Bulimia nervosa in remission 03/19/2014       LOS (days): 4  Geometric Mean LOS (GMLOS) (days): 3.3  Days to GMLOS:-0.7     OBJECTIVE:  Risk of Unplanned Readmission Score: 25.72         Current admission status: Inpatient   Preferred Pharmacy:   [facility] [place] - [place] - [address]  [address]  [place] [address]  Phone: [phone] Fax: [phone]    Primary Care Provider: Raheem Cain MD    [facility] Insurance: [email or web address]  Secondary Insurance: [facility]    DISCHARGE DETAILS:    Transport at Discharge : Free Local Transportation, Ride Share (Lyft scheduled for 2pm, entrance B.)

## 2024-10-01 NOTE — CASE MANAGEMENT
Case Management Discharge Planning Note    Patient name Irene Plascencia  Location /-01 MRN [id number]  : 1960 Date 10/1/2024       Current Admission Date: 2024  Current Admission Diagnosis:Aspiration pneumonia of left lower lobe (HCC)   Patient Active Problem List    Diagnosis Date Noted Date Diagnosed    Leukopenia 2024     Aspiration pneumonia (HCC) 2024     Heartburn 2024     Gastroparesis 2024     Acute cough 2024     Elevated vitamin B12 level 2024     Iron deficiency 2024     Acquired absence of other toe(s), unspecified side ([facility]) 2024     Osteomyelitis, unspecified site, unspecified type ([facility]) 2024     Vitamin B12 deficiency (dietary) anemia 2023     Folate deficiency 2023     Neutropenia ([facility]) 2023     Post concussion syndrome 2023     Sepsis without acute organ dysfunction ([facility]) 2023     Acute respiratory failure with hypoxia ([facility]) 2023     Headache 2023     Nutritional anemia 2023     Stress incontinence 2023     Ulcer of toe, chronic, left, with unspecified severity ([facility]) 2023     Acute encephalopathy 2022     Thrombocytosis 2022     Abdominal pain 2022     Schreiber's esophagus 2022     Microcytic anemia 2022     Self neglect 2022     Pruritus 2022     Upper GI bleed 01/10/2022     Nausea and vomiting 10/22/2021     S/P tooth extraction 10/22/2021     SIRS (systemic inflammatory response syndrome) ([facility]) 2021     Hyperlipidemia 2021     Word finding difficulty 2021     Hiatal hernia with GERD without esophagitis 2021     Polyp of colon 2021     Melena 2020     Cellulitis of left upper extremity 2020     Erosive esophagitis 2020     Rash 2020     Iron deficiency anemia 2020     Multiple excoriations 2020     Fall 2020     Esophagitis 2020      Hyponatremia 01/27/2020     Problem related to living arrangement 01/27/2020     Hypokalemia      NMS (neuroleptic malignant syndrome) 01/03/2020     Aspiration pneumonia of left lower lobe (HCC) 01/02/2020     Preop exam for internal medicine 11/18/2019     Severe iron deficiency anemia  11/14/2019     Chronic bilateral low back pain without sciatica 11/01/2019     Right hip pain 07/15/2019     Primary hypertension 06/12/2019     Dermal hypersensitivity reaction 11/08/2018     Psychiatric disorder 08/21/2018     Schizoaffective disorder (HCC) 08/16/2018     Serotonin syndrome 08/13/2018     Other fatigue 06/19/2018     Spinal stenosis of lumbar region with neurogenic claudication 06/15/2018     Lumbar spondylosis 06/15/2018     T12 compression fracture (HCC) 06/15/2018     Synovial cyst of lumbar facet joint 06/15/2018     Localized osteoporosis with current pathological fracture with routine healing 05/25/2018     Lumbar radiculopathy 03/19/2018     DDD (degenerative disc disease), lumbar 03/19/2018     Spondylolisthesis at L4-L5 level 03/19/2018     Anxiety 10/31/2016     Acquired hypothyroidism 10/31/2016     Constipation 04/10/2014     Bulimia nervosa in remission 03/19/2014       LOS (days): 4  Geometric Mean LOS (GMLOS) (days): 3.3  Days to GMLOS:-0.6     OBJECTIVE:  Risk of Unplanned Readmission Score: 25.43         Current admission status: Inpatient   Preferred Pharmacy:   [facility] [place] - [place] - [address]  [address]  [place] [address]  Phone: [phone] Fax: [phone]    Primary Care Provider: Raheem Cain MD    Primary Insurance: [employer] RE[employer]  Secondary Insurance: [facility]    DISCHARGE DETAILS:    Other Referral/Resources/Interventions Provided:  Referral Comments: Pt cleared for discharge home today per discussion w/ SLIM. Pt will require lyft ride home on discharge. Lyft waiver reviewed and signed by pt. Copy  placed in medical records bin. IMM reviewed with pt and signed. CM will arrange lyft ride when pt is ready for discharge. No further CM needs reported at this time.    Treatment Team Recommendation: Home  Discharge Destination Plan:: Home  Transport at Discharge : Free Local Transportation    IMM Given (Date):: 10/01/24  IMM Given to:: Patient (copy placed in medical records bin.)

## 2024-10-02 NOTE — DISCHARGE SUMMARY
Discharge Summary - [facility] Internal Medicine  Patient: Irene Plascencia 64 y.o. female   MRN: [id number]  PCP: Raheem Cain MD  Unit/Bed#: -01 Encounter: [id number]            Discharging Physician / Practitioner: Tracy Bansal MD  PCP: Raheem Cain MD  Admission Date:   Admission Orders (From admission, onward)       Ordered        09/27/24 1346  INPATIENT ADMISSION  Once                          Discharge Date: 10/01/24      Reason for Admission:  Shortness of breath      Discharge Diagnoses:     Principal Problem:    Aspiration pneumonia of left lower lobe (HCC)    Active Problems:    Severe iron deficiency anemia     Primary hypertension    Acquired hypothyroidism    Schreiber's esophagus    Leukopenia      Consultations During Hospital Stay:  None      Hospital Course:     Irene Plascencia is a 64 y.o. female patient who originally presented to the hospital on 9/27/2024 due to complaints of progressive shortness of breath, who is recently admitted earlier in September for aspiration pneumonia.  Her complaints on this admission included continued shortness of breath along with intermittent production of yellow sputum along with generalized weakness.  Initial CXR noted a residual left midlung opacity, with a subsequent CT of chest noting similar consolidation, however, now with right sided opacities as well, raising suspicion for recurrent aspiration, in the setting of a known Schreiber's esophagus.  She tolerated her dysphagia diet with thin liquids.  She completed a 5-day IV Cefepime course, with improvement in presenting symptoms.  Of note, she did have leukopenia on serial blood work, considered to be associated with chronic iron deficiency anemia.  Suspect, however, that WBC count will progressively rise/improve, after completion of antibiotic (cephalosporin) course.  She will be discharged home today with outpatient follow-up with her PCP, as instructed on discharge reconciliation.   "Chronic medications for ongoing medical issues, will be continued, otherwise.      Condition at Discharge: fair       Discharge Day Visit / Exam:     Vitals:  Blood Pressure: 117/66 (10/01/24 0722)  Pulse: 73 (10/01/24 0722)  Temperature: 98.2 °F (36.8 °C) (10/01/24 0722)  Temp Source: Oral (09/30/24 0749)  Respirations: 16 (09/30/24 2106)  Height: 5' 2\" (157.5 cm) (09/28/24 1315)  Weight - Scale: 81 kg (178 lb 9.2 oz) (10/01/24 0534)  SpO2: 92 % (10/01/24 0722)      Physical exam:  I had a face-to-face encounter with the patient on day of discharge.      Discussion with Patient and/or Family:  The patient has been advised to return to the ER immediately if any symptoms recur or worsen.       Discharge instructions/Information to Patient and/or Family:   See after visit summary for information provided to patient and/or family.        Provisions for Follow-Up Care:  See after visit summary for information related to follow-up care and any pertinent home health orders.        Disposition:   Home      Discharge Medications:  See after visit summary for reconciled discharge medications provided to patient and/or family.        Discharge Statement:  I spent 38 minutes discharging the patient. This time was spent on the day of discharge. I had direct contact with the patient on the day of discharge. Greater than 50% of the total time was spent examining patient, answering all patient questions, arranging and discussing plan of care with patient as well as directly providing post-discharge instructions.  Additional time then spent on discharge activities.           JENNIFFER ROJAS MD   Hospitalist - [facility] Internal Medicine        ** Please Note: This note is constructed using a voice recognition dictation system.  An occasional wrong word/phrase or “sound-a-like” substitution may have been picked up by dictation device due to the inherent limitations of voice recognition software.  Read the chart carefully and " recognize, using reasonable context, where substitutions may have occurred.**

## 2024-10-02 NOTE — PROGRESS NOTES
Pastoral Care Progress Note             10/02/24 1500   Clinical Encounter Type   Visited With Patient  (Father Karime)   Gnosticist Encounters   Gnosticist Needs Prayer  (Father Karime gave patient a blessing)

## 2024-10-09 ENCOUNTER — OFFICE VISIT (OUTPATIENT)
Dept: INTERNAL MEDICINE CLINIC | Facility: CLINIC | Age: 64
End: 2024-10-09
Payer: COMMERCIAL

## 2024-10-09 VITALS
BODY MASS INDEX: 34.39 KG/M2 | OXYGEN SATURATION: 91 % | WEIGHT: 188 LBS | DIASTOLIC BLOOD PRESSURE: 88 MMHG | SYSTOLIC BLOOD PRESSURE: 138 MMHG | HEART RATE: 86 BPM

## 2024-10-09 DIAGNOSIS — J69.0 ASPIRATION PNEUMONIA OF LEFT LOWER LOBE DUE TO GASTRIC SECRETIONS (HCC): Primary | ICD-10-CM

## 2024-10-09 DIAGNOSIS — E03.9 ACQUIRED HYPOTHYROIDISM: ICD-10-CM

## 2024-10-09 DIAGNOSIS — I10 PRIMARY HYPERTENSION: ICD-10-CM

## 2024-10-09 DIAGNOSIS — Z23 NEEDS FLU SHOT: ICD-10-CM

## 2024-10-09 PROCEDURE — 90673 RIV3 VACCINE NO PRESERV IM: CPT

## 2024-10-09 PROCEDURE — 99214 OFFICE O/P EST MOD 30 MIN: CPT | Performed by: INTERNAL MEDICINE

## 2024-10-09 PROCEDURE — G0008 ADMIN INFLUENZA VIRUS VAC: HCPCS

## 2024-10-09 NOTE — ASSESSMENT & PLAN NOTE
Treated, patient denies any problems swallowing foods.  She is not vomiting like she was which prompted her previous hospitalization.  No fevers or chills

## 2024-10-09 NOTE — PROGRESS NOTES
Ambulatory Visit  Name: Irene Plascencia      : 1960      MRN: [id number]  Encounter Provider: Raheem Cain MD  Encounter Date: 10/9/2024   Encounter department: [facility]    Assessment & Plan  Aspiration pneumonia of left lower lobe due to gastric secretions (HCC)  Treated, patient denies any problems swallowing foods.  She is not vomiting like she was which prompted her previous hospitalization.  No fevers or chills         Primary hypertension  Borderline, continue meds         Acquired hypothyroidism  Continue levothyroxine along with monitoring              History of Present Illness     Patient was recently in the hospital for aspiration pneumonia.  Since home, she reports she is still coughing, no fevers or chills, patient denies difficulty swallowing, no choking on food, no shortness of breath or chest pain      Review of Systems   Constitutional:  Positive for fatigue. Negative for chills and fever.   HENT:  Negative for congestion, nosebleeds, postnasal drip, sore throat and trouble swallowing.    Eyes:  Negative for pain.   Respiratory:  Positive for cough. Negative for chest tightness, shortness of breath and wheezing.    Cardiovascular:  Negative for chest pain, palpitations and leg swelling.   Gastrointestinal:  Negative for abdominal pain, constipation, diarrhea, nausea and vomiting.   Endocrine: Negative for polydipsia and polyuria.   Genitourinary:  Negative for dysuria, flank pain and hematuria.   Musculoskeletal:  Negative for arthralgias.   Skin:  Negative for rash.   Neurological:  Positive for dizziness and weakness. Negative for tremors, light-headedness and headaches.   Hematological:  Does not bruise/bleed easily.   Psychiatric/Behavioral:  Negative for confusion and dysphoric mood. The patient is not nervous/anxious.      Past Medical History:   Diagnosis Date    Anemia     Anxiety     Arthritis     Back pain at L4-L5 level     Schreiber esophagus      Bulimia     Colon polyp     Disease of thyroid gland     hypothyroid    GERD (gastroesophageal reflux disease)     Hearing loss in left ear     History of transfusion     Hyperlipidemia     Hypertension     Hypothyroidism     Leukopenia     NMS (neuroleptic malignant syndrome) 01/03/2020    Pneumonia     RA (rheumatoid arthritis) (HCC)     in feet    Sciatica     Seizure (HCC)     due to medication mix up 8/2018 only one historically    Skin spots, red     lower right arm - poss from cat- no s/s infection    Synovial cyst of lumbar spine     Vertigo     Wears dentures     full set    Weight gain      Past Surgical History:   Procedure Laterality Date    BONE MARROW BIOPSY      BREAST SURGERY      enlargement with implants    CLOSED REDUCTION DISTAL FEMUR FRACTURE      COLONOSCOPY      COSMETIC SURGERY      DENTAL SURGERY      HIP ARTHROPLASTY Right 01/02/2020    Procedure: REVISION TOTAL HIP ARTHROPLASTY WITH REPAIR OF PARIPROSTHETIC FRACTURE - POSTERIOR APPROACH;  Surgeon: Dilan Pedersen MD;  Location: [facility];  Service: Orthopedics    MT AMPUTATION METATARSAL W/TOE SINGLE Left 04/07/2023    Procedure: AMPUTATION TOE 4th;  Surgeon: Conner Bartlett DPM;  Location: [facility];  Service: Podiatry    MT ARTHRP ACETBLR/PROX FEM PROSTC AGRFT/ALGRFT Right 12/11/2019    Procedure: ARTHROPLASTY HIP TOTAL ANTERIOR;  Surgeon: Dilan Pedersen MD;  Location: [facility];  Service: Orthopedics    MT ESOPHAGOGASTRODUODENOSCOPY TRANSORAL DIAGNOSTIC N/A 05/11/2021    Procedure: ESOPHAGOGASTRODUODENOSCOPY (EGD);  Surgeon: David Cedeño MD;  Location: [facility];  Service: General    MT LAPS RPR PARAESPHGL HRNA INCL FUNDPLSTY W/MESH N/A 05/11/2021    Procedure: REPAIR HERNIA PARAESOPHAGEAL  LAPAROSCOPIC;  Surgeon: David Cedeño MD;  [facility];  Service: General    MT UNLISTED PROCEDURE ESOPHAGUS N/A 05/11/2021    Procedure: FUNDOPLICATION TRANSORAL INCISIONLESS;  Surgeon: Brad Cadet MD;  Location: [facility];   Service: Gastroenterology    RHINOPLASTY      SINUS SURGERY      TOE AMPUTATION Left     2023 4th toe    TRANSORAL INCISIONLESS FUNDOPLICATION (TIF)  05/11/2021     Family History   Problem Relation Age of Onset    Uterine cancer Mother     Esophageal cancer Father     Colon cancer Maternal Grandmother      Social History     Tobacco Use    Smoking status: Never     Passive exposure: Past    Smokeless tobacco: Never   Vaping Use    Vaping status: Never Used   Substance and Sexual Activity    Alcohol use: Not Currently    Drug use: Not Currently    Sexual activity: Not Currently     Current Outpatient Medications on File Prior to Visit   Medication Sig    amLODIPine (NORVASC) 5 mg tablet TAKE 1 TABLET (5 MG TOTAL) BY MOUTH DAILY.    levothyroxine 25 mcg tablet TAKE 1 TABLET BY MOUTH EVERY DAY    LORazepam (ATIVAN) 2 mg tablet Take 1 tablet (2 mg total) by mouth every 4 (four) hours as needed for anxiety    metoclopramide (REGLAN) 5 mg tablet Take 2 tablets (10 mg total) by mouth 3 (three) times a day before meals    pantoprazole (PROTONIX) 40 mg tablet TAKE 1 TABLET BY MOUTH TWICE A DAY    PARoxetine (PAXIL) 30 mg tablet TAKE 2 TABLETS (60 MG TOTAL) BY MOUTH IN THE MORNING    QUEtiapine (SEROquel XR) 400 mg 24 hr tablet TAKE 1 TABLET (400 MG TOTAL) BY MOUTH DAILY AT BEDTIME    sucralfate (CARAFATE) 1 g tablet TAKE 1 TABLET (1 G TOTAL) BY MOUTH 3 (THREE) TIMES A DAY WITH MEALS    buPROPion (WELLBUTRIN) 75 mg tablet Take 75 mg by mouth 3 (three) times a day (Patient not taking: Reported on 9/27/2024)    ziprasidone (GEODON) 80 mg capsule TAKE 1 CAPSULE BY MOUTH EVERY DAY (Patient not taking: Reported on 9/27/2024)     Allergies   Allergen Reactions    Latex Anaphylaxis, Rash and Tongue Swelling     Band aids, adhesives wears normal underwear, can eat bananas  USE PAPER TAPE    Risperdal [Risperidone] Shortness Of Breath     Rapid heart beat, SOB    Zyprexa [Olanzapine] Shortness Of Breath     Rapid heartbeat      Immunization History   Administered Date(s) Administered    COVID-19 PFIZER VACCINE 0.3 ML IM 03/20/2021, 04/10/2021, 10/09/2021    COVID-19 Pfizer Vac BIVALENT Jt-sucrose 12 Yr+ IM 09/10/2022    COVID-19 Pfizer mRNA vacc PF jt-sucrose 12 yr and older (Comirnaty) 10/04/2023    COVID-19 Pfizer vac (Jt-sucrose, gray cap) 12 yr+ IM 05/30/2022    H1N1, All Formulations 11/17/2009    INFLUENZA 11/17/2009, 01/04/2013, 11/11/2014, 10/20/2015, 10/31/2016, 09/16/2018, 11/07/2022    Influenza Injectable, MDCK, Preservative Free, Quadrivalent, 0.5 mL 09/26/2019    Influenza Quadrivalent, 6-35 Months IM 10/31/2016    Influenza, injectable, quadrivalent, preservative free 0.5 mL 09/05/2020, 09/26/2023    Influenza, recombinant, quadrivalent,injectable, preservative free 11/04/2021    Pneumococcal Conjugate 13-Valent 09/16/2018    Pneumococcal Conjugate Vaccine 20-valent (Pcv20), Polysace 04/26/2024    Rabies 07/29/2014, 08/01/2014, 08/05/2014    Tdap 07/29/2014, 12/17/2022, 06/16/2023     Objective     /88   Pulse 86   Wt 85.3 kg (188 lb)   SpO2 91%   BMI 34.39 kg/m²     Physical Exam  Vitals reviewed.   Constitutional:       General: She is not in acute distress.     Appearance: Normal appearance. She is well-developed.   HENT:      Head: Normocephalic and atraumatic.      Right Ear: External ear normal.      Left Ear: External ear normal.      Nose: Nose normal.   Eyes:      General: No scleral icterus.     Conjunctiva/sclera: Conjunctivae normal.   Neck:      Thyroid: No thyromegaly.      Trachea: No tracheal deviation.   Cardiovascular:      Rate and Rhythm: Normal rate and regular rhythm.      Heart sounds: No murmur heard.  Pulmonary:      Effort: No respiratory distress.      Breath sounds: Normal breath sounds. No wheezing or rales.   Musculoskeletal:      Cervical back: Normal range of motion and neck supple.      Right lower leg: No edema.      Left lower leg: No edema.   Lymphadenopathy:       Cervical: No cervical adenopathy.   Skin:     Coloration: Skin is not jaundiced or pale.   Neurological:      General: No focal deficit present.      Mental Status: She is alert and oriented to person, place, and time.   Psychiatric:         Mood and Affect: Mood normal.         Behavior: Behavior normal.         Thought Content: Thought content normal.         Judgment: Judgment normal.

## 2024-10-09 NOTE — PATIENT INSTRUCTIONS
Problem List Items Addressed This Visit          Cardiovascular and Mediastinum    Primary hypertension     Borderline, continue meds            Respiratory    Aspiration pneumonia (HCC) - Primary     Treated, patient denies any problems swallowing foods.  She is not vomiting like she was which prompted her previous hospitalization.  No fevers or chills            Endocrine    Acquired hypothyroidism     Continue levothyroxine along with monitoring

## 2024-10-10 ENCOUNTER — TELEPHONE (OUTPATIENT)
Age: 64
End: 2024-10-10

## 2024-10-10 NOTE — TELEPHONE ENCOUNTER
Patient called in stating she saw Dr. Cain 10/9 for a hospital follow up. Patient stated today she is not feeling well, is producing mucus, and her chest feels heavy like it did while she was in the hospital. Advised patient I could get a nurse on the line to speak with her, patient declined. Patient stated she feels like she needs oxygen to help with breathing like when in the hospital. Patient stated she does not want to go back to the hospital. Patient asked if anything could be sent to the pharmacy or if a visiting nurse could come see her. Patient is requesting a call back.  Please advise, thank you

## 2024-10-11 ENCOUNTER — APPOINTMENT (EMERGENCY)
Dept: RADIOLOGY | Facility: HOSPITAL | Age: 64
DRG: 177 | End: 2024-10-11
Payer: COMMERCIAL

## 2024-10-11 ENCOUNTER — HOSPITAL ENCOUNTER (INPATIENT)
Facility: HOSPITAL | Age: 64
LOS: 1 days | Discharge: HOME | DRG: 177 | End: 2024-10-12
Attending: EMERGENCY MEDICINE | Admitting: HOSPITALIST
Payer: COMMERCIAL

## 2024-10-11 ENCOUNTER — NURSE TRIAGE (OUTPATIENT)
Age: 64
End: 2024-10-11

## 2024-10-11 DIAGNOSIS — R09.02 HYPOXIA: ICD-10-CM

## 2024-10-11 DIAGNOSIS — R06.00 DYSPNEA: Primary | ICD-10-CM

## 2024-10-11 DIAGNOSIS — R11.10 VOMITING: ICD-10-CM

## 2024-10-11 DIAGNOSIS — E87.1 HYPONATREMIA: ICD-10-CM

## 2024-10-11 DIAGNOSIS — J69.0 ASPIRATION PNEUMONIA (HCC): ICD-10-CM

## 2024-10-11 DIAGNOSIS — R05.1 ACUTE COUGH: ICD-10-CM

## 2024-10-11 LAB
2HR DELTA HS TROPONIN: >2 NG/L
ALBUMIN SERPL BCG-MCNC: 4.5 G/DL (ref 3.5–5)
ALP SERPL-CCNC: 83 U/L (ref 34–104)
ALT SERPL W P-5'-P-CCNC: 12 U/L (ref 7–52)
ANION GAP SERPL CALCULATED.3IONS-SCNC: 10 MMOL/L (ref 4–13)
ANION GAP SERPL CALCULATED.3IONS-SCNC: 8 MMOL/L (ref 4–13)
AST SERPL W P-5'-P-CCNC: 18 U/L (ref 13–39)
BASOPHILS # BLD AUTO: 0.04 THOUSANDS/ΜL (ref 0–0.1)
BASOPHILS NFR BLD AUTO: 1 % (ref 0–1)
BILIRUB SERPL-MCNC: 0.47 MG/DL (ref 0.2–1)
BNP SERPL-MCNC: 15 PG/ML (ref 0–100)
BUN SERPL-MCNC: 6 MG/DL (ref 5–25)
BUN SERPL-MCNC: 6 MG/DL (ref 5–25)
CALCIUM SERPL-MCNC: 9.4 MG/DL (ref 8.4–10.2)
CALCIUM SERPL-MCNC: 9.7 MG/DL (ref 8.4–10.2)
CARDIAC TROPONIN I PNL SERPL HS: 4 NG/L
CARDIAC TROPONIN I PNL SERPL HS: <2 NG/L
CARDIAC TROPONIN I PNL SERPL HS: <2 NG/L
CHLORIDE SERPL-SCNC: 90 MMOL/L (ref 96–108)
CHLORIDE SERPL-SCNC: 97 MMOL/L (ref 96–108)
CO2 SERPL-SCNC: 24 MMOL/L (ref 21–32)
CO2 SERPL-SCNC: 28 MMOL/L (ref 21–32)
CREAT SERPL-MCNC: 0.58 MG/DL (ref 0.6–1.3)
CREAT SERPL-MCNC: 0.69 MG/DL (ref 0.6–1.3)
EOSINOPHIL # BLD AUTO: 0.14 THOUSAND/ΜL (ref 0–0.61)
EOSINOPHIL NFR BLD AUTO: 3 % (ref 0–6)
ERYTHROCYTE [DISTWIDTH] IN BLOOD BY AUTOMATED COUNT: 14.6 % (ref 11.6–15.1)
ERYTHROCYTE [SEDIMENTATION RATE] IN BLOOD: 46 MM/HOUR (ref 0–29)
GFR SERPL CREATININE-BSD FRML MDRD: 92 ML/MIN/1.73SQ M
GFR SERPL CREATININE-BSD FRML MDRD: 97 ML/MIN/1.73SQ M
GLUCOSE SERPL-MCNC: 85 MG/DL (ref 65–140)
GLUCOSE SERPL-MCNC: 96 MG/DL (ref 65–140)
HCT VFR BLD AUTO: 40.8 % (ref 34.8–46.1)
HGB BLD-MCNC: 13.4 G/DL (ref 11.5–15.4)
IMM GRANULOCYTES # BLD AUTO: 0.01 THOUSAND/UL (ref 0–0.2)
IMM GRANULOCYTES NFR BLD AUTO: 0 % (ref 0–2)
LIPASE SERPL-CCNC: 10 U/L (ref 11–82)
LYMPHOCYTES # BLD AUTO: 0.48 THOUSANDS/ΜL (ref 0.6–4.47)
LYMPHOCYTES NFR BLD AUTO: 10 % (ref 14–44)
MCH RBC QN AUTO: 28 PG (ref 26.8–34.3)
MCHC RBC AUTO-ENTMCNC: 32.8 G/DL (ref 31.4–37.4)
MCV RBC AUTO: 85 FL (ref 82–98)
MONOCYTES # BLD AUTO: 0.36 THOUSAND/ΜL (ref 0.17–1.22)
MONOCYTES NFR BLD AUTO: 8 % (ref 4–12)
NEUTROPHILS # BLD AUTO: 3.74 THOUSANDS/ΜL (ref 1.85–7.62)
NEUTS SEG NFR BLD AUTO: 78 % (ref 43–75)
NRBC BLD AUTO-RTO: 0 /100 WBCS
PLATELET # BLD AUTO: 322 THOUSANDS/UL (ref 149–390)
PMV BLD AUTO: 8.2 FL (ref 8.9–12.7)
POTASSIUM SERPL-SCNC: 3.6 MMOL/L (ref 3.5–5.3)
POTASSIUM SERPL-SCNC: 3.8 MMOL/L (ref 3.5–5.3)
PROCALCITONIN SERPL-MCNC: <0.05 NG/ML
PROT SERPL-MCNC: 7.4 G/DL (ref 6.4–8.4)
RBC # BLD AUTO: 4.78 MILLION/UL (ref 3.81–5.12)
SODIUM SERPL-SCNC: 124 MMOL/L (ref 135–147)
SODIUM SERPL-SCNC: 133 MMOL/L (ref 135–147)
TSH SERPL DL<=0.05 MIU/L-ACNC: 1.44 UIU/ML (ref 0.45–4.5)
WBC # BLD AUTO: 4.77 THOUSAND/UL (ref 4.31–10.16)

## 2024-10-11 PROCEDURE — 85025 COMPLETE CBC W/AUTO DIFF WBC: CPT

## 2024-10-11 PROCEDURE — 83690 ASSAY OF LIPASE: CPT

## 2024-10-11 PROCEDURE — 84145 PROCALCITONIN (PCT): CPT | Performed by: HOSPITALIST

## 2024-10-11 PROCEDURE — 85652 RBC SED RATE AUTOMATED: CPT | Performed by: HOSPITALIST

## 2024-10-11 PROCEDURE — 96375 TX/PRO/DX INJ NEW DRUG ADDON: CPT

## 2024-10-11 PROCEDURE — 94760 N-INVAS EAR/PLS OXIMETRY 1: CPT

## 2024-10-11 PROCEDURE — 99223 1ST HOSP IP/OBS HIGH 75: CPT | Performed by: HOSPITALIST

## 2024-10-11 PROCEDURE — 71046 X-RAY EXAM CHEST 2 VIEWS: CPT

## 2024-10-11 PROCEDURE — 99223 1ST HOSP IP/OBS HIGH 75: CPT | Performed by: INTERNAL MEDICINE

## 2024-10-11 PROCEDURE — 83880 ASSAY OF NATRIURETIC PEPTIDE: CPT | Performed by: HOSPITALIST

## 2024-10-11 PROCEDURE — 99285 EMERGENCY DEPT VISIT HI MDM: CPT | Performed by: EMERGENCY MEDICINE

## 2024-10-11 PROCEDURE — 96374 THER/PROPH/DIAG INJ IV PUSH: CPT

## 2024-10-11 PROCEDURE — 80048 BASIC METABOLIC PNL TOTAL CA: CPT | Performed by: HOSPITALIST

## 2024-10-11 PROCEDURE — 84443 ASSAY THYROID STIM HORMONE: CPT | Performed by: HOSPITALIST

## 2024-10-11 PROCEDURE — 36415 COLL VENOUS BLD VENIPUNCTURE: CPT

## 2024-10-11 PROCEDURE — 80053 COMPREHEN METABOLIC PANEL: CPT

## 2024-10-11 PROCEDURE — 93005 ELECTROCARDIOGRAM TRACING: CPT

## 2024-10-11 PROCEDURE — 84484 ASSAY OF TROPONIN QUANT: CPT

## 2024-10-11 PROCEDURE — 94664 DEMO&/EVAL PT USE INHALER: CPT

## 2024-10-11 PROCEDURE — 99285 EMERGENCY DEPT VISIT HI MDM: CPT

## 2024-10-11 RX ORDER — GUAIFENESIN 600 MG/1
600 TABLET, EXTENDED RELEASE ORAL EVERY 12 HOURS SCHEDULED
Status: DISCONTINUED | OUTPATIENT
Start: 2024-10-11 | End: 2024-10-12 | Stop reason: HOSPADM

## 2024-10-11 RX ORDER — LORAZEPAM 1 MG/1
2 TABLET ORAL 2 TIMES DAILY PRN
Status: DISCONTINUED | OUTPATIENT
Start: 2024-10-11 | End: 2024-10-12 | Stop reason: HOSPADM

## 2024-10-11 RX ORDER — QUETIAPINE 200 MG/1
400 TABLET, FILM COATED, EXTENDED RELEASE ORAL
Status: DISCONTINUED | OUTPATIENT
Start: 2024-10-11 | End: 2024-10-12 | Stop reason: HOSPADM

## 2024-10-11 RX ORDER — LEVOTHYROXINE SODIUM 25 UG/1
25 TABLET ORAL
Status: DISCONTINUED | OUTPATIENT
Start: 2024-10-12 | End: 2024-10-12 | Stop reason: HOSPADM

## 2024-10-11 RX ORDER — METOCLOPRAMIDE HYDROCHLORIDE 5 MG/ML
10 INJECTION INTRAMUSCULAR; INTRAVENOUS ONCE
Status: COMPLETED | OUTPATIENT
Start: 2024-10-11 | End: 2024-10-11

## 2024-10-11 RX ORDER — ENOXAPARIN SODIUM 100 MG/ML
40 INJECTION SUBCUTANEOUS DAILY
Status: DISCONTINUED | OUTPATIENT
Start: 2024-10-11 | End: 2024-10-12 | Stop reason: HOSPADM

## 2024-10-11 RX ORDER — PANTOPRAZOLE SODIUM 40 MG/1
40 TABLET, DELAYED RELEASE ORAL 2 TIMES DAILY
Status: DISCONTINUED | OUTPATIENT
Start: 2024-10-11 | End: 2024-10-12 | Stop reason: HOSPADM

## 2024-10-11 RX ORDER — SUCRALFATE 1 G/1
1 TABLET ORAL
Status: DISCONTINUED | OUTPATIENT
Start: 2024-10-11 | End: 2024-10-12 | Stop reason: HOSPADM

## 2024-10-11 RX ORDER — AMLODIPINE BESYLATE 5 MG/1
5 TABLET ORAL DAILY
Status: DISCONTINUED | OUTPATIENT
Start: 2024-10-12 | End: 2024-10-12 | Stop reason: HOSPADM

## 2024-10-11 RX ORDER — METOCLOPRAMIDE 10 MG/1
10 TABLET ORAL
Status: DISCONTINUED | OUTPATIENT
Start: 2024-10-11 | End: 2024-10-12 | Stop reason: HOSPADM

## 2024-10-11 RX ORDER — ONDANSETRON 4 MG/1
1 TABLET, ORALLY DISINTEGRATING ORAL ONCE
Status: COMPLETED | OUTPATIENT
Start: 2024-10-11 | End: 2024-10-11

## 2024-10-11 RX ORDER — PAROXETINE 20 MG/1
40 TABLET, FILM COATED ORAL DAILY
Status: DISCONTINUED | OUTPATIENT
Start: 2024-10-11 | End: 2024-10-12 | Stop reason: HOSPADM

## 2024-10-11 RX ADMIN — SODIUM CHLORIDE 500 ML: 0.9 INJECTION, SOLUTION INTRAVENOUS at 14:52

## 2024-10-11 RX ADMIN — SUCRALFATE 1 G: 1 TABLET ORAL at 18:18

## 2024-10-11 RX ADMIN — QUETIAPINE FUMARATE 400 MG: 200 TABLET, EXTENDED RELEASE ORAL at 20:54

## 2024-10-11 RX ADMIN — METOCLOPRAMIDE 10 MG: 10 TABLET ORAL at 20:55

## 2024-10-11 RX ADMIN — PANTOPRAZOLE SODIUM 40 MG: 40 TABLET, DELAYED RELEASE ORAL at 18:17

## 2024-10-11 RX ADMIN — PAROXETINE HYDROCHLORIDE 40 MG: 20 TABLET, FILM COATED ORAL at 20:54

## 2024-10-11 RX ADMIN — AZITHROMYCIN MONOHYDRATE 500 MG: 500 INJECTION, POWDER, LYOPHILIZED, FOR SOLUTION INTRAVENOUS at 15:58

## 2024-10-11 RX ADMIN — CEFTRIAXONE SODIUM 1000 MG: 10 INJECTION, POWDER, FOR SOLUTION INTRAVENOUS at 14:51

## 2024-10-11 RX ADMIN — ENOXAPARIN SODIUM 40 MG: 40 INJECTION SUBCUTANEOUS at 18:17

## 2024-10-11 RX ADMIN — METOCLOPRAMIDE 10 MG: 5 INJECTION, SOLUTION INTRAMUSCULAR; INTRAVENOUS at 14:51

## 2024-10-11 NOTE — TELEPHONE ENCOUNTER
Since the patient is feeling worse she should be seen for evaluation.  If we do not have any openings here in office she should either go to an urgent care or back to the emergency department.

## 2024-10-11 NOTE — UTILIZATION REVIEW
"NOTIFICATION OF INPATIENT ADMISSION   AUTHORIZATION REQUEST   SERVICING FACILITY:   [facility] hospital  Address: [address] [place] [address]  Tax ID: [id number]  NPI: [id number] ATTENDING PROVIDER:  Attending Name and NPI#: Lauren Burrows Md [[id number]]  Address: [address] [place] [address]  Phone: [phone]   ADMISSION INFORMATION:  Place of Service: Inpatient [facility]  Place of Service Code: 21  Inpatient Admission Date/Time: 10/11/24  2:54 PM  Discharge Date/Time: No discharge date for patient encounter.  Admitting Diagnosis Code/Description:  Shortness of breath [R06.02]     UTILIZATION REVIEW CONTACT:  Doris Phelps"Janae\"Domingo Utilization   Network Utilization Review Department  Phone: [phone]  Fax: [phone]  Email: [email or web address]  Contact for approvals/pending authorizations, clinical reviews, and discharge.     PHYSICIAN ADVISORY SERVICES:  Medical Necessity Denial & Cow[employer] Review  Phone: [phone]  Fax: [phone]  Email: [email or web address]     DISCHARGE SUPPORT TEAM:  For Patients Discharge Needs & Updates  Phone: [phone] opt. 2 Fax: [phone]  Email: [email or web address]      "

## 2024-10-11 NOTE — TELEPHONE ENCOUNTER
Patient called to follow up from call 10/10/2024, patient is not feeling better is getting worse, wants to speak to nurse, called and spoke to Kailey FELDMAN and warm transfer completed

## 2024-10-11 NOTE — ED PROVIDER NOTES
Time reflects when diagnosis was documented in both MDM as applicable and the Disposition within this note       Time User Action Codes Description Comment    10/11/2024  1:58 PM Jeremy Morales [R06.00] Dyspnea     10/11/2024  1:58 PM Jeremy Morales [R11.10] Vomiting     10/11/2024  1:58 PM Jeremy Morales [E87.1] Hyponatremia     10/11/2024  2:33 PM Jeremy Morales [J69.0] Aspiration pneumonia (HCC)     10/11/2024  2:33 PM Jeremy Morales [R09.02] Hypoxia           ED Disposition       ED Disposition   Admit    Condition   Stable    Date/Time   Fri Oct 11, 2024  2:33 PM    Comment                  Assessment & Plan       Medical Decision Making  Patient is a 64-year-old female presenting for evaluation of shortness of breath    Differential: Aspiration versus community-acquired pneumonia versus viral illness doubt ACS or PE doubt acute intra-abdominal catastrophe    Plan: Abdominal and cardiac labs, EKG, chest x-ray, supplemental oxygen monitor and reassess final dispo pending but anticipate admission    See ED course    Given new oxygen requirement and questionable infiltrate on chest x-ray will treat as community-acquired/aspiration pneumonia.  Will admit for this as well as hyponatremia.  Discussed with medicine who accepts patient for admission.  Patient hemodynamically stable at time of admission    Amount and/or Complexity of Data Reviewed  Labs: ordered. Decision-making details documented in ED Course.  Radiology: ordered.  ECG/medicine tests:  Decision-making details documented in ED Course.    Risk  Prescription drug management.  Decision regarding hospitalization.        ED Course as of 10/11/24 1454   Fri Oct 11, 2024   1329 ECG 12 lead  Normal sinus rhythm 82 no ischemic changes ST elevations or depressions   1329 Blood Pressure: 147/87   1329 Temperature: 98.4 °F (36.9 °C)   1329 Pulse: 86   1329 Respirations: 20   1329 SpO2: 94 %   1353 WBC: 4.77   1353 Hemoglobin: 13.4    1353 Platelet Count: 322   1353 Sodium(!): 124  HypoNa+   1354 GFR, Calculated: 97   1354 Potassium: 3.8   1427 On reassessment pt desats to 92% in room air, 98% on 2L NC       Medications   sodium chloride 0.9 % bolus 500 mL (500 mL Intravenous New Bag 10/11/24 1452)   ceftriaxone (ROCEPHIN) 1 g/50 mL in dextrose IVPB (1,000 mg Intravenous New Bag 10/11/24 1451)   azithromycin (ZITHROMAX) 500 mg in sodium chloride 0.9% 250mL IVPB 500 mg (has no administration in time range)   ondansetron (FOR EMS ONLY) (ZOFRAN-ODT) 4 mg dispersible tablet 4 mg (0 mg Does not apply Given to EMS 10/11/24 1301)   metoclopramide (REGLAN) injection 10 mg (10 mg Intravenous Given 10/11/24 1451)       ED Risk Strat Scores                                               History of Present Illness       Chief Complaint   Patient presents with    Shortness of Breath     Pt reports SOB that started yestreday, called EMS d/t symptoms worsening, per EMS pt vomited twice gave PO Zofran.       Past Medical History:   Diagnosis Date    Anemia     Anxiety     Arthritis     Back pain at L4-L5 level     Schreiber esophagus     Bulimia     Colon polyp     Disease of thyroid gland     hypothyroid    GERD (gastroesophageal reflux disease)     Hearing loss in left ear     History of transfusion     Hyperlipidemia     Hypertension     Hypothyroidism     Leukopenia     NMS (neuroleptic malignant syndrome) 01/03/2020    Pneumonia     RA (rheumatoid arthritis) (HCC)     in feet    Sciatica     Seizure (HCC)     due to medication mix up 8/2018 only one historically    Skin spots, red     lower right arm - poss from cat- no s/s infection    Synovial cyst of lumbar spine     Vertigo     Wears dentures     full set    Weight gain       Past Surgical History:   Procedure Laterality Date    BONE MARROW BIOPSY      BREAST SURGERY      enlargement with implants    CLOSED REDUCTION DISTAL FEMUR FRACTURE      COLONOSCOPY      COSMETIC SURGERY      DENTAL SURGERY       HIP ARTHROPLASTY Right 01/02/2020    Procedure: REVISION TOTAL HIP ARTHROPLASTY WITH REPAIR OF PARIPROSTHETIC FRACTURE - POSTERIOR APPROACH;  Surgeon: Dilan Pedersen MD;  Location: [facility];  Service: Orthopedics    CA AMPUTATION METATARSAL W/TOE SINGLE Left 04/07/2023    Procedure: AMPUTATION TOE 4th;  Surgeon: Conner Bartlett DPM;  Location: [facility];  Service: Podiatry    CA ARTHRP ACETBLR/PROX FEM PROSTC AGRFT/ALGRFT Right 12/11/2019    Procedure: ARTHROPLASTY HIP TOTAL ANTERIOR;  Surgeon: Dilan Pedersen MD;  Location[facility];  Service: Orthopedics    CA ESOPHAGOGASTRODUODENOSCOPY TRANSORAL DIAGNOSTIC N/A 05/11/2021    Procedure: ESOPHAGOGASTRODUODENOSCOPY (EGD);  Surgeon: David Cedeño MD;  Location: [facility];  Service: General    CA LAPS RPR PARAESPHGL HRNA INCL FUNDPLSTY W/MESH N/A 05/11/2021    Procedure: REPAIR HERNIA PARAESOPHAGEAL  LAPAROSCOPIC;  Surgeon: David Cedeño MD;  [facility];  Service: General    CA UNLISTED PROCEDURE ESOPHAGUS N/A 05/11/2021    Procedure: FUNDOPLICATION TRANSORAL INCISIONLESS;  Surgeon: Brad Cadet MD;  Location: [facility];  Service: Gastroenterology    RHINOPLASTY      SINUS SURGERY      TOE AMPUTATION Left     2023 4th toe    TRANSORAL INCISIONLESS FUNDOPLICATION (TIF)  05/11/2021      Family History   Problem Relation Age of Onset    Uterine cancer Mother     Esophageal cancer Father     Colon cancer Maternal Grandmother       Social History     Tobacco Use    Smoking status: Never     Passive exposure: Past    Smokeless tobacco: Never   Vaping Use    Vaping status: Never Used   Substance Use Topics    Alcohol use: Not Currently    Drug use: Not Currently      E-Cigarette/Vaping    E-Cigarette Use Never User       E-Cigarette/Vaping Substances    Nicotine No     THC No     CBD No     Flavoring No     Other No     Unknown No       I have reviewed and agree with the history as documented.     Patient is a 64-year-old female past medical history of  hypertension GERD anemia seizure recently hospitalized for aspiration pneumonia x 1 month ago that presents for evaluation of shortness of breath.  Patient reports that her symptoms have been ongoing since she was discharged home having the same symptoms that brought her to the hospital she states that she is having dyspnea on exertion with associated cough productive of sputum as well as nausea and vomiting.  Had 2 episodes of nonbloody nonbilious vomiting today in the ambulance en route.  Is not having any chest pain palpitations lower extremity edema abdominal pain back pain diarrhea constipation urinary complaints no recent travel no smoking history does not wear oxygen at baseline.  Denying any other complaints at this time      Shortness of Breath  Associated symptoms: vomiting    Associated symptoms: no abdominal pain, no chest pain, no cough, no ear pain, no fever, no rash and no sore throat        Review of Systems   Constitutional:  Negative for chills and fever.   HENT:  Negative for ear pain and sore throat.    Eyes:  Negative for pain and visual disturbance.   Respiratory:  Positive for shortness of breath. Negative for cough.    Cardiovascular:  Negative for chest pain and palpitations.   Gastrointestinal:  Positive for nausea and vomiting. Negative for abdominal pain.   Genitourinary:  Negative for dysuria and hematuria.   Musculoskeletal:  Negative for arthralgias and back pain.   Skin:  Negative for color change and rash.   Neurological:  Negative for seizures and syncope.   All other systems reviewed and are negative.          Objective       ED Triage Vitals [10/11/24 1301]   Temperature Pulse Blood Pressure Respirations SpO2 Patient Position - Orthostatic VS   98.4 °F (36.9 °C) 86 147/87 20 94 % Lying      Temp Source Heart Rate Source BP Location FiO2 (%) Pain Score    Oral Monitor Right arm -- No Pain      Vitals      Date and Time Temp Pulse SpO2 Resp BP Pain Score FACES Pain Rating User    10/11/24 1301 98.4 °F (36.9 °C) 86 94 % 20 147/87 No Pain -- CS            Physical Exam  Vitals and nursing note reviewed.   Constitutional:       General: She is not in acute distress.     Appearance: She is well-developed. She is obese. She is ill-appearing (Chronically). She is not diaphoretic.   HENT:      Head: Normocephalic and atraumatic.      Mouth/Throat:      Mouth: Mucous membranes are moist.   Eyes:      Extraocular Movements: Extraocular movements intact.      Conjunctiva/sclera: Conjunctivae normal.   Cardiovascular:      Rate and Rhythm: Normal rate and regular rhythm.      Heart sounds: No murmur heard.  Pulmonary:      Effort: Pulmonary effort is normal. No tachypnea or respiratory distress.      Breath sounds: Examination of the right-upper field reveals decreased breath sounds. Examination of the right-middle field reveals decreased breath sounds. Examination of the right-lower field reveals decreased breath sounds and rhonchi. Decreased breath sounds and rhonchi present. No wheezing or rales.   Abdominal:      Palpations: Abdomen is soft.      Tenderness: There is no abdominal tenderness.   Musculoskeletal:         General: No swelling. Normal range of motion.      Cervical back: Normal range of motion and neck supple.      Right lower leg: No edema.      Left lower leg: No edema.   Skin:     General: Skin is warm and dry.      Capillary Refill: Capillary refill takes less than 2 seconds.   Neurological:      General: No focal deficit present.      Mental Status: She is alert.         Results Reviewed       Procedure Component Value Units Date/Time    Sedimentation rate, automated [[id number]]     Lab Status: No result Specimen: Blood     Procalcitonin [[id number]]     Lab Status: No result Specimen: Blood     HS Troponin 0hr (reflex protocol) [[id number]]  (Normal) Collected: 10/11/24 1320    Lab Status: Final result Specimen: Blood from Hand, Right Updated: 10/11/24 1358     hs TnI 0hr <2 ng/L      HS Troponin I 2hr [[id number]]     Lab Status: No result Specimen: Blood     Comprehensive metabolic panel [[id number]]  (Abnormal) Collected: 10/11/24 1320    Lab Status: Final result Specimen: Blood from Hand, Right Updated: 10/11/24 1350     Sodium 124 mmol/L      Potassium 3.8 mmol/L      Chloride 90 mmol/L      CO2 24 mmol/L      ANION GAP 10 mmol/L      BUN 6 mg/dL      Creatinine 0.58 mg/dL      Glucose 96 mg/dL      Calcium 9.7 mg/dL      AST 18 U/L      ALT 12 U/L      Alkaline Phosphatase 83 U/L      Total Protein 7.4 g/dL      Albumin 4.5 g/dL      Total Bilirubin 0.47 mg/dL      eGFR 97 ml/min/1.73sq m     Narrative:      National Kid[employer] guidelines for Chronic Kidney Disease (CKD):     Stage 1 with normal or high GFR (GFR > 90 mL/min/1.73 square meters)    Stage 2 Mild CKD (GFR = 60-89 mL/min/1.73 square meters)    Stage 3A Moderate CKD (GFR = 45-59 mL/min/1.73 square meters)    Stage 3B Moderate CKD (GFR = 30-44 mL/min/1.73 square meters)    Stage 4 Severe CKD (GFR = 15-29 mL/min/1.73 square meters)    Stage 5 End Stage CKD (GFR <15 mL/min/1.73 square meters)  Note: GFR calculation is accurate only with a steady state creatinine    Lipase [[id number]]  (Abnormal) Collected: 10/11/24 1320    Lab Status: Final result Specimen: Blood from Hand, Right Updated: 10/11/24 1350     Lipase 10 u/L     CBC and differential [[id number]]  (Abnormal) Collected: 10/11/24 1320    Lab Status: Final result Specimen: Blood from Hand, Right Updated: 10/11/24 1330     WBC 4.77 Thousand/uL      RBC 4.78 Million/uL      Hemoglobin 13.4 g/dL      Hematocrit 40.8 %      MCV 85 fL      MCH 28.0 pg      MCHC 32.8 g/dL      RDW 14.6 %      MPV 8.2 fL      Platelets 322 Thousands/uL      nRBC 0 /100 WBCs      Segmented % 78 %      Immature Grans % 0 %      Lymphocytes % 10 %      Monocytes % 8 %      Eosinophils Relative 3 %      Basophils Relative 1 %      Absolute Neutrophils 3.74 Thousands/µL      Absolute  Immature Grans 0.01 Thousand/uL      Absolute Lymphocytes 0.48 Thousands/µL      Absolute Monocytes 0.36 Thousand/µL      Eosinophils Absolute 0.14 Thousand/µL      Basophils Absolute 0.04 Thousands/µL             XR chest 2 views   Final Interpretation by Ramon Soriano DO (10/11 5840)      Lungs are grossly clear. No definite acute cardiopulmonary disease.               Resident: Hany Alicia I, the attending radiologist, have reviewed the images and agree with the final report above.      Workstation performed: [id number]             Procedures    ED Medication and Procedure Management   Prior to Admission Medications   Prescriptions Last Dose Informant Patient Reported? Taking?   LORazepam (ATIVAN) 2 mg tablet   No No   Sig: Take 1 tablet (2 mg total) by mouth every 4 (four) hours as needed for anxiety   PARoxetine (PAXIL) 30 mg tablet   No No   Sig: TAKE 2 TABLETS (60 MG TOTAL) BY MOUTH IN THE MORNING   QUEtiapine (SEROquel XR) 400 mg 24 hr tablet  Self No No   Sig: TAKE 1 TABLET (400 MG TOTAL) BY MOUTH DAILY AT BEDTIME   amLODIPine (NORVASC) 5 mg tablet  Self No No   Sig: TAKE 1 TABLET (5 MG TOTAL) BY MOUTH DAILY.   buPROPion (WELLBUTRIN) 75 mg tablet   Yes No   Sig: Take 75 mg by mouth 3 (three) times a day   Patient not taking: Reported on 9/27/2024   levothyroxine 25 mcg tablet  Self No No   Sig: TAKE 1 TABLET BY MOUTH EVERY DAY   metoclopramide (REGLAN) 5 mg tablet   No No   Sig: Take 2 tablets (10 mg total) by mouth 3 (three) times a day before meals   pantoprazole (PROTONIX) 40 mg tablet   No No   Sig: TAKE 1 TABLET BY MOUTH TWICE A DAY   sucralfate (CARAFATE) 1 g tablet   No No   Sig: TAKE 1 TABLET (1 G TOTAL) BY MOUTH 3 (THREE) TIMES A DAY WITH MEALS   ziprasidone (GEODON) 80 mg capsule  Self No No   Sig: TAKE 1 CAPSULE BY MOUTH EVERY DAY   Patient not taking: Reported on 9/27/2024      Facility-Administered Medications: None     Patient's Medications   Discharge Prescriptions    No  medications on file     No discharge procedures on file.  ED SEPSIS DOCUMENTATION   Time reflects when diagnosis was documented in both MDM as applicable and the Disposition within this note       Time User Action Codes Description Comment    10/11/2024  1:58 PM Jeremy Morales [R06.00] Dyspnea     10/11/2024  1:58 PM Jeremy Morales [R11.10] Vomiting     10/11/2024  1:58 PM Jeremy Morales [E87.1] Hyponatremia     10/11/2024  2:33 PM Jeremy Morales [J69.0] Aspiration pneumonia (HCC)     10/11/2024  2:33 PM Jeremy Morales [R09.02] Hypoxia                  Jeremy Morales DO  10/12/24 1349

## 2024-10-11 NOTE — TELEPHONE ENCOUNTER
"Pt calling.  She was recently admitted to the hospital with aspiration pneumonia.  She was discharged home on 10/01/2024 and evaluated in the office 10/09/2024.  She states that she is feeling worse than when she was in the office for her visit.  She has increased SOB again.  She is SOB all the time. She states that she feels winded during this conversation.  She wants to know if a visiting nurse can be sent out to check on her. RN advised ED, pt initially declined then became agreeable.  Pt states that she will call EMS to take her.   Reason for Disposition  • Patient sounds very sick or weak to the triager    Answer Assessment - Initial Assessment Questions  1. RESPIRATORY STATUS: \"Describe your breathing?\" (e.g., wheezing, shortness of breath, unable to speak, severe coughing)       SOB all the time   2. ONSET: \"When did this breathing problem begin?\"       Ongoing, getting worse   3. PATTERN \"Does the difficult breathing come and go, or has it been constant since it started?\"       Constant   4. SEVERITY: \"How bad is your breathing?\" (e.g., mild, moderate, severe)     - MILD: No SOB at rest, mild SOB with walking, speaks normally in sentences, can lay down, no retractions, pulse < 100.     - MODERATE: SOB at rest, SOB with minimal exertion and prefers to sit, cannot lie down flat, speaks in phrases, mild retractions, audible wheezing, pulse 100-120.     - SEVERE: Very SOB at rest, speaks in single words, struggling to breathe, sitting hunched forward, retractions, pulse > 120       Moderate   5. RECURRENT SYMPTOM: \"Have you had difficulty breathing before?\" If Yes, ask: \"When was the last time?\" and \"What happened that time?\"       Ongoing   6. CARDIAC HISTORY: \"Do you have any history of heart disease?\" (e.g., heart attack, angina, bypass surgery, angioplasty)       Yes   7. LUNG HISTORY: \"Do you have any history of lung disease?\"  (e.g., pulmonary embolus, asthma, emphysema)      Yes   8. CAUSE: \"What do you " "think is causing the breathing problem?\"       Aspiration pnuemonia   9. OTHER SYMPTOMS: \"Do you have any other symptoms? (e.g., dizziness, runny nose, cough, chest pain, fever)      no  10. PREGNANCY: \"Is there any chance you are pregnant?\" \"When was your last menstrual period?\"        no  11. TRAVEL: \"Have you traveled out of the country in the last month?\" (e.g., travel history, exposures)        no    Protocols used: Breathing Difficulty-ADULT-[place]    "

## 2024-10-11 NOTE — ASSESSMENT & PLAN NOTE
By exam appears close to euvolemic but she does report she did not eat much yesterday due to nausea vomiting.  Urine electrolytes repeat TSH cortisol ordered.  Was challenged with normal saline in ER will repeat sodium.  If not improving engage with nephrology.

## 2024-10-11 NOTE — ASSESSMENT & PLAN NOTE
Continue Seroquel and Paxil.  She also is on Ativan 2 mg as needed and reports takes it about twice a day prescribed by Dr. Cain.  Patient reports is not on geodon; and not necessarily because of the Reglan

## 2024-10-11 NOTE — CONSULTS
Consultation - Pulmonology   Name: Irene Plascencia 64 y.o. female I MRN: [id number]  Unit/Bed#: ED 06 I Date of Admission: 10/11/2024   Date of Service: 10/11/2024 I Hospital Day: 0   Inpatient consult to Pulmonology  Consult performed by: Agata Bach DO  Consult ordered by: Lauren Burrows MD        Physician Requesting Evaluation: Lauren Burrows MD   Reason for Evaluation / Principal Problem: recurrent pneumonia    Assessment & Plan  Acute respiratory failure with hypoxia (HCC)  - currently on room air oxygen  Cough  - unclear etiology. May be post viral cough. CXR is unrevealing.   - no leukocytosis, afebrile, CXR is clear, procalcitonin negative.  - hold antibiotics and monitor  Hyponatremia  - history of hyponatremia, Nephrology note from May reviewed, attributed to psychiatry medication  - fluid water restrict  Schizoaffective disorder (HCC)  - Cont. Home Paroxetine, Quetiapine, Buproprion   Primary hypertension    Esophagitis    Gastroparesis    Discussed with pt. Concerns addressed.  Discussed with Dr. Burrows.    Ok to d/c tomorrow from pulmonary standpoint if no change in clinical status.    Needs f/u CT end of November to ensure changes on CT chest 9/29/24 have resolved.         History of Present Illness   Irene Plascencia is a 64 y.o. female PMH iron deficiency anemia, Schreiber's esophagus, hiatal hernia who presented to the ER with cough with yellow sputum.     States she has been coughing at home x 1 day. Fever of 99F. SOB since yesterday. Called her PCP and RN recommended ER evaluation. Denies decreased appetite, v/d.  Has occasional nausea.    Denies excessive water intake.    Records reviewed. Pt admitted 9/16-9/20 with coffee ground emesis and abdominal pain. She was treated empirically for esophagitis due to NSAID use. Imaging showed lingular and LLL opacity, she was treated with ceftriaxone for presume pneumonia.      Admitted 9/27-10/1 with shortness of breath and productive  cough.   Imaging showed ongoing LLL consolidation and new right-sided opacities. Treated with Cefepime x 5 days.     Never smoker.   Lives alone.  Worked as a  -  at [employer].  Mother  in July.   broke up with her in August.    Review of Systems  Positive as above and negative otherwise  Historical Information   I have reviewed the patient's PMH, PSH, Social History, Family History, Meds, and Allergies  Tobacco History: Never smoker  Occupational History: retired  Family History: cousin had lung disease and was smoker    Objective :  Temp:  [98.4 °F (36.9 °C)] 98.4 °F (36.9 °C)  HR:  [86] 86  BP: (147)/(87) 147/87  Resp:  [20] 20  SpO2:  [94 %] 94 %  O2 Device: None (Room air)    Physical Exam  GEN: WDWN, nad, comfortable  HEENT: NCAT, EOMI  LUNGS: clear, moving air, no wheezing  ABD: soft  EXT: No c/c/e  NEURO: No focal deficits  MS: Moving all extremities  SKIN: warm, dry  PSYCH: calm, cooperative      Lab Results: I have reviewed the following results:  .     10/11/24  1320   WBC 4.77   HGB 13.4   HCT 40.8      SODIUM 124*   K 3.8   CL 90*   CO2 24   BUN 6   CREATININE 0.58*   GLUC 96   AST 18   ALT 12   ALB 4.5   TBILI 0.47   ALKPHOS 83   HSTNI0 <2     Labs per my review reveal hyponatremia, new; normal renal function    Imaging Results Review: I personally reviewed the following image studies in PACS and associated radiology reports: chest xray. My interpretation of the radiology images/reports is: CXR per my review shows clear b/l lungs.    Other Study Results Review: Other studies reviewed include: ECHO 24: EF normal, diastolic function normal    VTE Prophylaxis: Enoxaparin (Lovenox)

## 2024-10-11 NOTE — PROGRESS NOTES
Addendum to H and P  Patient was seen by pulmonary Dr. Bach; she indicated no role for antibiotics hold off on CT chest.  Will keep patient as inpatient for now given hyponatremia with sodium that was 136 on 10 1 and now 124 and needs closer monitoring and trending   Lauren Montes De Oca

## 2024-10-11 NOTE — ASSESSMENT & PLAN NOTE
-Patient with recent admissions and treatments for pneumonia as delineated in HPI.  Pulmonary exam is underwhelming in the ER.  She is on 2 L nasal cannula.  Will check CT chest noncontrast as well as BNP and procalcitonin.  Mucinex.  As needed DuoNeb.  Pulmonary will be asked for help in management  Recent echo showed EF of 61% and does not present as heart failure.

## 2024-10-11 NOTE — ASSESSMENT & PLAN NOTE
- history of hyponatremia, Nephrology note from May reviewed, attributed to psychiatry medication  - fluid water restrict

## 2024-10-11 NOTE — ASSESSMENT & PLAN NOTE
Discussed with pt. Concerns addressed.  Discussed with Dr. Burrows.    Ok to d/c tomorrow from pulmonary standpoint if no change in clinical status.    Needs f/u CT end of November to ensure changes on CT chest 9/29/24 have resolved.

## 2024-10-11 NOTE — H&P
H&P - Hospitalist   Name: Irene Plascencia 64 y.o. female I MRN: [id number]  Unit/Bed#: ED 06 I Date of Admission: 10/11/2024   Date of Service: 10/11/2024 I Hospital Day: 0     Assessment & Plan  Cough  -Patient with recent admissions and treatments for pneumonia as delineated in HPI.  Pulmonary exam is underwhelming in the ER.  She is on 2 L nasal cannula.  Will check CT chest noncontrast as well as BNP and procalcitonin.  Mucinex.  As needed DuoNeb.  Pulmonary will be asked for help in management  Recent echo showed EF of 61% and does not present as heart failure.  Acute respiratory failure with hypoxia (HCC)  Supplemental oxygen and wean as tolerated.  Hyponatremia  By exam appears close to euvolemic but she does report she did not eat much yesterday due to nausea vomiting.  Urine electrolytes repeat TSH cortisol ordered.  Was challenged with normal saline in ER will repeat sodium.  If not improving engage with nephrology.  Gastroparesis  Prior history of gastroparesis on Reglan which will be continued.  Acquired hypothyroidism  Continue Synthroid.  Schizoaffective disorder (HCC)  Continue Seroquel and Paxil.  She also is on Ativan 2 mg as needed and reports takes it about twice a day prescribed by Dr. Cain.  Patient reports is not on geodon; and not necessarily because of the Reglan  Primary hypertension  Continue amlodipine  Esophagitis  Continue PPI.      VTE Pharmacologic Prophylaxis:   Moderate Risk (Score 3-4) - Pharmacological DVT Prophylaxis Ordered: enoxaparin (Lovenox).  Code Status: Level 1 - Full Code   Discussion with family: Patient declined call to .     Anticipated Length of Stay: Patient will be admitted on an inpatient basis with an anticipated length of stay of greater than 2 midnights secondary to presenting issues as outlined above..    History of Present Illness   Chief Complaint: Cough    Irene Plascencia is a 64 y.o. female with a PMH of hypothyroidism, Schreiber's  esophagus, hypertension, history of anemia and recent admissions for pneumonia who presents with cough shortness of breath.  Patient was admitted to this facility and discharged September 20, 2024 when she presented with coffee-ground emesis with background history of esophagitis gastroparesis.  At that time patient was treated for aspiration pneumonia as well and was discharged on cefdinir. GI did not recommend endoscopy at that time.  Recommended for Reglan.  Patient was then admitted September 27, 2024 for worsening cough and shortness of breath.  During this hospitalization patient underwent CT chest with IV contrast that showed left greater than right opacities, compatible with infection may be secondary to aspiration given chronic findings of esophagitis as well as subsequent 2 m right upper lobe nodular consolidation which is suspected infectious/inflammatory with recommendation for repeat CAT scan in 3 months.  Patient was treated with cefepime.  Patient was seen by speech and placed on dysphagia level 3 with thin liquids.  Patient was stabilized and discharged home on 10/1/2024.  She now reports that over the past week has had worsening cough productive of yellow sputum no fevers no chills she reports dyspnea with exertion she denies lower extremity edema she denies calf tenderness she denies sick contacts in fact she lives alone.  No pets in the house.  There is no wheezing.  She has been in the past few days developed nausea vomiting did not eat much yesterday no abdominal pain.  She called EMS today was brought to the emergency room.  Patient is a lifelong non-smoker.  No history of asthma.     In the ER, provider saturation was 92 on room air placed on 2 L nasal cannula at the time of my evaluation she is saturating in the high 90s on 2 L blood pressure 133/76 pulse 76 she is afebrile has received Rocephin and azithromycin as well as normal saline bolus due to sodium of 124 and is referred to the  hospitalist service.  Review of Systems    Historical Information   Past Medical History:   Diagnosis Date    Anemia     Anxiety     Arthritis     Back pain at L4-L5 level     Schreiber esophagus     Bulimia     Colon polyp     Disease of thyroid gland     hypothyroid    GERD (gastroesophageal reflux disease)     Hearing loss in left ear     History of transfusion     Hyperlipidemia     Hypertension     Hypothyroidism     Leukopenia     NMS (neuroleptic malignant syndrome) 01/03/2020    Pneumonia     RA (rheumatoid arthritis) (HCC)     in feet    Sciatica     Seizure (HCC)     due to medication mix up 8/2018 only one historically    Skin spots, red     lower right arm - poss from cat- no s/s infection    Synovial cyst of lumbar spine     Vertigo     Wears dentures     full set    Weight gain      Past Surgical History:   Procedure Laterality Date    BONE MARROW BIOPSY      BREAST SURGERY      enlargement with implants    CLOSED REDUCTION DISTAL FEMUR FRACTURE      COLONOSCOPY      COSMETIC SURGERY      DENTAL SURGERY      HIP ARTHROPLASTY Right 01/02/2020    Procedure: REVISION TOTAL HIP ARTHROPLASTY WITH REPAIR OF PARIPROSTHETIC FRACTURE - POSTERIOR APPROACH;  Surgeon: Dilan Pedersen MD;  Location: [facility];  Service: Orthopedics    ME AMPUTATION METATARSAL W/TOE SINGLE Left 04/07/2023    Procedure: AMPUTATION TOE 4th;  Surgeon: Conner Bartlett DPM;  Location:  MAIN OR;  Service: Podiatry    ME ARTHRP ACETBLR/PROX FEM PROSTC AGRFT/ALGRFT Right 12/11/2019    Procedure: ARTHROPLASTY HIP TOTAL ANTERIOR;  Surgeon: Dilan Pedersen MD;  Location:  MAIN OR;  Service: Orthopedics    ME ESOPHAGOGASTRODUODENOSCOPY TRANSORAL DIAGNOSTIC N/A 05/11/2021    Procedure: ESOPHAGOGASTRODUODENOSCOPY (EGD);  Surgeon: David Cedeño MD;  Location:  MAIN OR;  Service: General    ME LAPS RPR PARAESPHGL HRNA INCL FUNDPLSTY W/MESH N/A 05/11/2021    Procedure: REPAIR HERNIA PARAESOPHAGEAL  LAPAROSCOPIC;  Surgeon: David Cedeño MD;   [facility];  Service: General    MS UNLISTED PROCEDURE ESOPHAGUS N/A 05/11/2021    Procedure: FUNDOPLICATION TRANSORAL INCISIONLESS;  Surgeon: Brad Cadet MD;  Location: [facility];  Service: Gastroenterology    RHINOPLASTY      SINUS SURGERY      TOE AMPUTATION Left     2023 4th toe    TRANSORAL INCISIONLESS FUNDOPLICATION (TIF)  05/11/2021     Social History     Tobacco Use    Smoking status: Never     Passive exposure: Past    Smokeless tobacco: Never   Vaping Use    Vaping status: Never Used   Substance and Sexual Activity    Alcohol use: Not Currently    Drug use: Not Currently    Sexual activity: Not Currently     E-Cigarette/Vaping    E-Cigarette Use Never User      E-Cigarette/Vaping Substances    Nicotine No     THC No     CBD No     Flavoring No     Other No     Unknown No        Social History:  Marital Status: Single   Occupation: Disabled  Patient Pre-hospital Living Situation: Home  Patient Pre-hospital Level of Mobility: walks  Patient Pre-hospital Diet Restrictions: Dysphagia level 3 with thin liquids    Meds/Allergies   (Not in a hospital admission)    Allergies   Allergen Reactions    Latex Anaphylaxis, Rash and Tongue Swelling     Band aids, adhesives wears normal underwear, can eat bananas  USE PAPER TAPE    Risperdal [Risperidone] Shortness Of Breath     Rapid heart beat, SOB    Zyprexa [Olanzapine] Shortness Of Breath     Rapid heartbeat       Objective :  Temp:  [98.4 °F (36.9 °C)] 98.4 °F (36.9 °C)  HR:  [86] 86  BP: (147)/(87) 147/87  Resp:  [20] 20  SpO2:  [94 %] 94 %  O2 Device: None (Room air)    Physical Exam  Constitutional:       Appearance: Normal appearance.   HENT:      Head: Normocephalic and atraumatic.      Mouth/Throat:      Mouth: Mucous membranes are moist.      Pharynx: Oropharynx is clear.      Comments: Mildly dry  Eyes:      Extraocular Movements: Extraocular movements intact.      Pupils: Pupils are equal, round, and reactive to light.   Cardiovascular:       Rate and Rhythm: Normal rate and regular rhythm.   Pulmonary:      Effort: Pulmonary effort is normal.      Breath sounds: Normal breath sounds.      Comments: No extremis on 2 L nasal cannula.  Clear throughout slightly diminished on the right but clears with deep inspiration I do not appreciate overt wheezes or crackles  Abdominal:      General: Abdomen is flat. Bowel sounds are normal.      Palpations: Abdomen is soft.   Musculoskeletal:         General: Normal range of motion.      Cervical back: Normal range of motion.   Skin:     General: Skin is warm and dry.   Neurological:      General: No focal deficit present.      Mental Status: She is alert.   Psychiatric:         Mood and Affect: Mood normal.      Comments: Calm and cooperative           Lines/Drains:            Lab Results: I have reviewed the following results:  Results from last 7 days   Lab Units 10/11/24  1320   WBC Thousand/uL 4.77   HEMOGLOBIN g/dL 13.4   HEMATOCRIT % 40.8   PLATELETS Thousands/uL 322   SEGS PCT % 78*   LYMPHO PCT % 10*   MONO PCT % 8   EOS PCT % 3     Results from last 7 days   Lab Units 10/11/24  1320   SODIUM mmol/L 124*   POTASSIUM mmol/L 3.8   CHLORIDE mmol/L 90*   CO2 mmol/L 24   BUN mg/dL 6   CREATININE mg/dL 0.58*   ANION GAP mmol/L 10   CALCIUM mg/dL 9.7   ALBUMIN g/dL 4.5   TOTAL BILIRUBIN mg/dL 0.47   ALK PHOS U/L 83   ALT U/L 12   AST U/L 18   GLUCOSE RANDOM mg/dL 96             Lab Results   Component Value Date    HGBA1C 5.0 05/13/2021    HGBA1C 4.5 12/04/2019    HGBA1C 4.9 11/14/2019     Results from last 7 days   Lab Units 10/11/24  1320   PROCALCITONIN ng/ml <0.05       Imaging Results Review: I personally reviewed the following image studies/reports in PACS and discussed pertinent findings with Radiology: chest xray. My interpretation of the radiology images/reports is:  .  Other Study Results Review: EKG was reviewed.     Administrative Statements       ** Please Note: This note has been constructed using a  voice recognition system. **

## 2024-10-11 NOTE — ASSESSMENT & PLAN NOTE
- unclear etiology. May be post viral cough. CXR is unrevealing.   - no leukocytosis, afebrile, CXR is clear, procalcitonin negative.  - hold antibiotics and monitor

## 2024-10-11 NOTE — RESPIRATORY THERAPY NOTE
RT Protocol Note  Irene Plascencia 64 y.o. female MRN: [id number]  Unit/Bed#: ED 06 Encounter: [id number]    Assessment    Active Problems:    Acquired hypothyroidism    Schizoaffective disorder (HCC)    Primary hypertension    Esophagitis    Hyponatremia    Cough    Acute respiratory failure with hypoxia (HCC)    Gastroparesis      Home Pulmonary Medications:  none       Past Medical History:   Diagnosis Date    Anemia     Anxiety     Arthritis     Back pain at L4-L5 level     Schreiber esophagus     Bulimia     Colon polyp     Disease of thyroid gland     hypothyroid    GERD (gastroesophageal reflux disease)     Hearing loss in left ear     History of transfusion     Hyperlipidemia     Hypertension     Hypothyroidism     Leukopenia     NMS (neuroleptic malignant syndrome) 01/03/2020    Pneumonia     RA (rheumatoid arthritis) (HCC)     in feet    Sciatica     Seizure (HCC)     due to medication mix up 8/2018 only one historically    Skin spots, red     lower right arm - poss from cat- no s/s infection    Synovial cyst of lumbar spine     Vertigo     Wears dentures     full set    Weight gain      Social History     Socioeconomic History    Marital status: Single     Spouse name: None    Number of children: None    Years of education: None    Highest education level: None   Occupational History    None   Tobacco Use    Smoking status: Never     Passive exposure: Past    Smokeless tobacco: Never   Vaping Use    Vaping status: Never Used   Substance and Sexual Activity    Alcohol use: Not Currently    Drug use: Not Currently    Sexual activity: Not Currently   Other Topics Concern    None   Social History Narrative    Family disruption death of family member parent, per allscripts     Social Determinants of Health     Financial Resource Strain: Medium Risk (1/4/2023)    Overall Financial Resource Strain (CARDIA)     Difficulty of Paying Living Expenses: Somewhat hard   Food Insecurity: No Food Insecurity (9/28/2024)     Hunger Vital Sign     Worried About Running Out of Food in the Last Year: Never true     Ran Out of Food in the Last Year: Never true   Transportation Needs: No Transportation Needs (9/28/2024)    PRAPARE - Transportation     Lack of Transportation (Medical): No     Lack of Transportation (Non-Medical): No   Physical Activity: Not on file   Stress: Stress Concern Present (5/18/2021)    [employer] [place] [employer] - Occupational Stress Questionnaire     Feeling of Stress : To some extent   Social Connections: Not on file   Intimate Partner Violence: Not on file   Housing Stability: Low Risk  (9/28/2024)    Housing Stability Vital Sign     Unable to Pay for Housing in the Last Year: No     Number of Times Moved in the Last Year: 0     Homeless in the Last Year: No       Subjective         Objective    Physical Exam:   Assessment Type: Assess only  General Appearance: Alert, Awake  Respiratory Pattern: Spontaneous, Symmetrical, Normal, Dyspnea at rest, Dyspnea with exertion  Chest Assessment: Chest expansion symmetrical  Bilateral Breath Sounds: Clear    Vitals:  Blood pressure 147/87, pulse 86, temperature 98.4 °F (36.9 °C), temperature source Oral, resp. rate 20, SpO2 97%, not currently breastfeeding.          Imaging and other studies: Results Review Statement: I reviewed radiology reports from this admission including: chest xray.          Plan    Respiratory Plan: Discontinue Protocol        Resp Comments: pt assses for RCP.pt doesnt have any pulmonary hx and doesnt take any resp med at home.will D/C RCP at this time.

## 2024-10-12 VITALS
SYSTOLIC BLOOD PRESSURE: 120 MMHG | WEIGHT: 176.81 LBS | DIASTOLIC BLOOD PRESSURE: 77 MMHG | OXYGEN SATURATION: 93 % | RESPIRATION RATE: 19 BRPM | HEIGHT: 62 IN | TEMPERATURE: 97.4 F | BODY MASS INDEX: 32.54 KG/M2 | HEART RATE: 85 BPM

## 2024-10-12 LAB
ALBUMIN SERPL BCG-MCNC: 4 G/DL (ref 3.5–5)
ALP SERPL-CCNC: 75 U/L (ref 34–104)
ALT SERPL W P-5'-P-CCNC: 10 U/L (ref 7–52)
ANION GAP SERPL CALCULATED.3IONS-SCNC: 7 MMOL/L (ref 4–13)
ANION GAP SERPL CALCULATED.3IONS-SCNC: 8 MMOL/L (ref 4–13)
AST SERPL W P-5'-P-CCNC: 13 U/L (ref 13–39)
BASOPHILS # BLD AUTO: 0.04 THOUSANDS/ΜL (ref 0–0.1)
BASOPHILS NFR BLD AUTO: 2 % (ref 0–1)
BILIRUB SERPL-MCNC: 0.37 MG/DL (ref 0.2–1)
BILIRUB UR QL STRIP: NEGATIVE
BUN SERPL-MCNC: 6 MG/DL (ref 5–25)
BUN SERPL-MCNC: 7 MG/DL (ref 5–25)
CALCIUM SERPL-MCNC: 9.3 MG/DL (ref 8.4–10.2)
CALCIUM SERPL-MCNC: 9.5 MG/DL (ref 8.4–10.2)
CHLORIDE SERPL-SCNC: 97 MMOL/L (ref 96–108)
CHLORIDE SERPL-SCNC: 99 MMOL/L (ref 96–108)
CHLORIDE UR-SCNC: <15 MMOL/L
CLARITY UR: CLEAR
CO2 SERPL-SCNC: 27 MMOL/L (ref 21–32)
CO2 SERPL-SCNC: 29 MMOL/L (ref 21–32)
COLOR UR: COLORLESS
CORTIS AM PEAK SERPL-MCNC: 14 UG/DL (ref 6.7–22.6)
CREAT SERPL-MCNC: 0.61 MG/DL (ref 0.6–1.3)
CREAT SERPL-MCNC: 0.72 MG/DL (ref 0.6–1.3)
CREAT UR-MCNC: 62 MG/DL
EOSINOPHIL # BLD AUTO: 0.21 THOUSAND/ΜL (ref 0–0.61)
EOSINOPHIL NFR BLD AUTO: 8 % (ref 0–6)
ERYTHROCYTE [DISTWIDTH] IN BLOOD BY AUTOMATED COUNT: 14.8 % (ref 11.6–15.1)
GFR SERPL CREATININE-BSD FRML MDRD: 88 ML/MIN/1.73SQ M
GFR SERPL CREATININE-BSD FRML MDRD: 96 ML/MIN/1.73SQ M
GLUCOSE SERPL-MCNC: 89 MG/DL (ref 65–140)
GLUCOSE SERPL-MCNC: 95 MG/DL (ref 65–140)
GLUCOSE UR STRIP-MCNC: NEGATIVE MG/DL
HCT VFR BLD AUTO: 39.6 % (ref 34.8–46.1)
HGB BLD-MCNC: 12.6 G/DL (ref 11.5–15.4)
HGB UR QL STRIP.AUTO: NEGATIVE
IMM GRANULOCYTES # BLD AUTO: 0.01 THOUSAND/UL (ref 0–0.2)
IMM GRANULOCYTES NFR BLD AUTO: 0 % (ref 0–2)
KETONES UR STRIP-MCNC: NEGATIVE MG/DL
LEUKOCYTE ESTERASE UR QL STRIP: NEGATIVE
LYMPHOCYTES # BLD AUTO: 0.62 THOUSANDS/ΜL (ref 0.6–4.47)
LYMPHOCYTES NFR BLD AUTO: 25 % (ref 14–44)
MCH RBC QN AUTO: 27.3 PG (ref 26.8–34.3)
MCHC RBC AUTO-ENTMCNC: 31.8 G/DL (ref 31.4–37.4)
MCV RBC AUTO: 86 FL (ref 82–98)
MONOCYTES # BLD AUTO: 0.34 THOUSAND/ΜL (ref 0.17–1.22)
MONOCYTES NFR BLD AUTO: 14 % (ref 4–12)
NEUTROPHILS # BLD AUTO: 1.27 THOUSANDS/ΜL (ref 1.85–7.62)
NEUTS SEG NFR BLD AUTO: 51 % (ref 43–75)
NITRITE UR QL STRIP: NEGATIVE
NRBC BLD AUTO-RTO: 0 /100 WBCS
PH UR STRIP.AUTO: 6 [PH]
PLATELET # BLD AUTO: 259 THOUSANDS/UL (ref 149–390)
PMV BLD AUTO: 8.6 FL (ref 8.9–12.7)
POTASSIUM SERPL-SCNC: 3.7 MMOL/L (ref 3.5–5.3)
POTASSIUM SERPL-SCNC: 4.2 MMOL/L (ref 3.5–5.3)
PROT SERPL-MCNC: 6.4 G/DL (ref 6.4–8.4)
PROT UR STRIP-MCNC: NEGATIVE MG/DL
RBC # BLD AUTO: 4.61 MILLION/UL (ref 3.81–5.12)
SODIUM 24H UR-SCNC: 17 MOL/L
SODIUM SERPL-SCNC: 132 MMOL/L (ref 135–147)
SODIUM SERPL-SCNC: 135 MMOL/L (ref 135–147)
SP GR UR STRIP.AUTO: 1.01 (ref 1–1.03)
UROBILINOGEN UR STRIP-ACNC: <2 MG/DL
WBC # BLD AUTO: 2.49 THOUSAND/UL (ref 4.31–10.16)

## 2024-10-12 PROCEDURE — 82570 ASSAY OF URINE CREATININE: CPT | Performed by: HOSPITALIST

## 2024-10-12 PROCEDURE — 85025 COMPLETE CBC W/AUTO DIFF WBC: CPT | Performed by: HOSPITALIST

## 2024-10-12 PROCEDURE — 99238 HOSP IP/OBS DSCHRG MGMT 30/<: CPT | Performed by: PHYSICIAN ASSISTANT

## 2024-10-12 PROCEDURE — 84300 ASSAY OF URINE SODIUM: CPT | Performed by: HOSPITALIST

## 2024-10-12 PROCEDURE — 81003 URINALYSIS AUTO W/O SCOPE: CPT | Performed by: HOSPITALIST

## 2024-10-12 PROCEDURE — 82436 ASSAY OF URINE CHLORIDE: CPT | Performed by: HOSPITALIST

## 2024-10-12 PROCEDURE — 80053 COMPREHEN METABOLIC PANEL: CPT | Performed by: HOSPITALIST

## 2024-10-12 PROCEDURE — 82533 TOTAL CORTISOL: CPT | Performed by: HOSPITALIST

## 2024-10-12 PROCEDURE — 80048 BASIC METABOLIC PNL TOTAL CA: CPT | Performed by: HOSPITALIST

## 2024-10-12 RX ORDER — GUAIFENESIN 600 MG/1
600 TABLET, EXTENDED RELEASE ORAL EVERY 12 HOURS SCHEDULED
Qty: 10 TABLET | Refills: 0 | Status: SHIPPED | OUTPATIENT
Start: 2024-10-12 | End: 2024-10-17

## 2024-10-12 RX ORDER — BENZONATATE 200 MG/1
200 CAPSULE ORAL 3 TIMES DAILY PRN
Qty: 20 CAPSULE | Refills: 0 | Status: SHIPPED | OUTPATIENT
Start: 2024-10-12

## 2024-10-12 RX ADMIN — LEVOTHYROXINE SODIUM 25 MCG: 25 TABLET ORAL at 06:24

## 2024-10-12 RX ADMIN — PANTOPRAZOLE SODIUM 40 MG: 40 TABLET, DELAYED RELEASE ORAL at 09:34

## 2024-10-12 RX ADMIN — METOCLOPRAMIDE 10 MG: 10 TABLET ORAL at 11:54

## 2024-10-12 RX ADMIN — ENOXAPARIN SODIUM 40 MG: 40 INJECTION SUBCUTANEOUS at 09:34

## 2024-10-12 RX ADMIN — SUCRALFATE 1 G: 1 TABLET ORAL at 11:54

## 2024-10-12 RX ADMIN — PAROXETINE HYDROCHLORIDE 40 MG: 20 TABLET, FILM COATED ORAL at 09:34

## 2024-10-12 RX ADMIN — AMLODIPINE BESYLATE 5 MG: 5 TABLET ORAL at 09:34

## 2024-10-12 RX ADMIN — SUCRALFATE 1 G: 1 TABLET ORAL at 09:34

## 2024-10-12 RX ADMIN — METOCLOPRAMIDE 10 MG: 10 TABLET ORAL at 06:24

## 2024-10-12 NOTE — ASSESSMENT & PLAN NOTE
Patient presented with persistent cough after recent admission/treatment for pneumonia   Pulmonary inputs appreciated, likely postviral   Continue with symptomatic and supportive cares

## 2024-10-12 NOTE — CASE MANAGEMENT
Case Management Assessment & Discharge Planning Note    Patient name Irene Plascencia  Location /-01 MRN [id number]  : 1960 Date 10/12/2024       Current Admission Date: 10/11/2024  Current Admission Diagnosis:Acute respiratory failure with hypoxia (HCC)   Patient Active Problem List    Diagnosis Date Noted Date Diagnosed    Leukopenia 2024     Aspiration pneumonia (HCC) 2024     Heartburn 2024     Gastroparesis 2024     Acute cough 2024     Elevated vitamin B12 level 2024     Iron deficiency 2024     Acquired absence of other toe(s), unspecified side ([facility]) 2024     Osteomyelitis, unspecified site, unspecified type ([facility]) 2024     Vitamin B12 deficiency (dietary) anemia 2023     Folate deficiency 2023     Neutropenia (HCC) 2023     Post concussion syndrome 2023     Sepsis without acute organ dysfunction ([facility]) 2023     Acute respiratory failure with hypoxia ([facility]) 2023     Headache 2023     Nutritional anemia 2023     Stress incontinence 2023     Ulcer of toe, chronic, left, with unspecified severity ([facility]) 2023     Acute encephalopathy 2022     Thrombocytosis 2022     Abdominal pain 2022     Cough 2022     Schreiber's esophagus 2022     Microcytic anemia 2022     Self neglect 2022     Pruritus 2022     Upper GI bleed 01/10/2022     Nausea and vomiting 10/22/2021     S/P tooth extraction 10/22/2021     SIRS (systemic inflammatory response syndrome) (HCC) 2021     Hyperlipidemia 2021     Word finding difficulty 2021     Hiatal hernia with GERD without esophagitis 2021     Polyp of colon 2021     Melena 2020     Cellulitis of left upper extremity 2020     Erosive esophagitis 2020     Rash 2020     Iron deficiency anemia 2020     Multiple excoriations 2020     Fall  01/27/2020     Esophagitis 01/27/2020     Hyponatremia 01/27/2020     Problem related to living arrangement 01/27/2020     Hypokalemia      NMS (neuroleptic malignant syndrome) 01/03/2020     Aspiration pneumonia of left lower lobe (HCC) 01/02/2020     Preop exam for internal medicine 11/18/2019     Severe iron deficiency anemia  11/14/2019     Chronic bilateral low back pain without sciatica 11/01/2019     Right hip pain 07/15/2019     Primary hypertension 06/12/2019     Dermal hypersensitivity reaction 11/08/2018     Psychiatric disorder 08/21/2018     Schizoaffective disorder (HCC) 08/16/2018     Serotonin syndrome 08/13/2018     Other fatigue 06/19/2018     Spinal stenosis of lumbar region with neurogenic claudication 06/15/2018     Lumbar spondylosis 06/15/2018     T12 compression fracture (HCC) 06/15/2018     Synovial cyst of lumbar facet joint 06/15/2018     Localized osteoporosis with current pathological fracture with routine healing 05/25/2018     Lumbar radiculopathy 03/19/2018     DDD (degenerative disc disease), lumbar 03/19/2018     Spondylolisthesis at L4-L5 level 03/19/2018     Anxiety 10/31/2016     Acquired hypothyroidism 10/31/2016     Constipation 04/10/2014     Bulimia nervosa in remission 03/19/2014       LOS (days): 1  Geometric Mean LOS (GMLOS) (days):   Days to GMLOS:     OBJECTIVE:  PATIENT READMITTED TO HOSPITAL  Risk of Unplanned Readmission Score: 27.58         Current admission status: Inpatient  Referral Reason: Other    Preferred Pharmacy:   [facility] [place] - [place], [place] - [address]  [address]  [place] [address]  Phone: [phone] Fax: [phone]    Primary Care Provider: Raheem Cain MD    Primary Insurance: [employer]  Secondary Insurance: [facility]    ASSESSMENT:  Active Health Care Proxies       María, [facility] Representative - Life Partner   Primary Phone: [phone] (Home)                     Readmission Root Cause  30 Day Readmission: No    Patient Information  Admitted from:: Home  Mental Status: Alert  During Assessment patient was accompanied by: Not accompanied during assessment  Assessment information provided by:: Patient  Primary Caregiver: Self  Support Systems: Self  County of Residence: [place]  What city do you live in?: [place]  Home entry access options. Select all that apply.: Stairs  Number of steps to enter home.: 2  Type of Current Residence: [facility]  Living Arrangements: Lives Alone    Activities of Daily Living Prior to Admission  Functional Status: Independent  Completes ADLs independently?: Yes  Ambulates independently?: Yes  Does patient use assisted devices?: No  Does patient currently own DME?: No  Does patient have a history of Outpatient Therapy (PT/OT)?: Yes  Does the patient have a history of Short-Term Rehab?: Yes ([facility] in [place])  Does patient have a history of HHC?: No  Does patient currently have HHC?: No         Patient Information Continued  Income Source: SSI/SSD  Does patient have prescription coverage?: Yes  Does patient receive dialysis treatments?: No  Does patient have a history of substance abuse?: No  Does patient have a history of Mental Health Diagnosis?: Yes (MDD, ABIMAEL)  Is patient receiving treatment for mental health?: Yes  Has patient received inpatient treatment related to mental health in the last 2 years?: No         Means of Transportation  Means of Transport to Appts:: Public Transportation - Lyft      Social Determinants of Health (SDOH)      Flowsheet Row Most Recent Value   Housing Stability    In the last 12 months, was there a time when you were not able to pay the mortgage or rent on time? N   In the past 12 months, how many times have you moved where you were living? 0   At any time in the past 12 months, were you homeless or living in a shelter (including now)? N   Transportation Needs    In the past 12 months, has lack of  transportation kept you from medical appointments or from getting medications? no   In the past 12 months, has lack of transportation kept you from meetings, work, or from getting things needed for daily living? No   Food Insecurity    Within the past 12 months, you worried that your food would run out before you got the money to buy more. Never true   Within the past 12 months, the food you bought just didn't last and you didn't have money to get more. Never true   Utilities    In the past 12 months has the electric, gas, oil, or water company threatened to shut off services in your home? No            DISCHARGE DETAILS:    Discharge planning discussed with:: Patient  Freedom of Choice: Yes     CM contacted family/caregiver?: No- see comments (declined)  Were Treatment Team discharge recommendations reviewed with patient/caregiver?: Yes  Did patient/caregiver verbalize understanding of patient care needs?: Yes  Were patient/caregiver advised of the risks associated with not following Treatment Team discharge recommendations?: Yes     Other Referral/Resources/Interventions Provided:  Interventions: Transportation  Referral Comments: CM spoke to pt about transport to home as pt is d/c.  Pt states she will need assistance.  CM confirmed pt's address ([facility] [address]) and that pt has keys.  Pt to be picked up from [employer] [facility].  CM to request a 1300  time.         Treatment Team Recommendation: Home  Discharge Destination Plan:: Home  Transport at Discharge : Ride Share

## 2024-10-12 NOTE — PLAN OF CARE
Problem: RESPIRATORY - ADULT  Goal: Achieves optimal ventilation and oxygenation  Description: INTERVENTIONS:  - Assess for changes in respiratory status  - Assess for changes in mentation and behavior  - Position to facilitate oxygenation and minimize respiratory effort  - Oxygen administered by appropriate delivery if ordered  - Initiate smoking cessation education as indicated  - Encourage broncho-pulmonary hygiene including cough, deep breathe, Incentive Spirometry  - Assess the need for suctioning and aspirate as needed  - Assess and instruct to report SOB or any respiratory difficulty  - Respiratory Therapy support as indicated  10/12/2024 1338 by Aleida Pandey RN  Outcome: Adequate for Discharge  10/12/2024 1338 by Aleida Pandey RN  Outcome: Progressing     Problem: GASTROINTESTINAL - ADULT  Goal: Minimal or absence of nausea and/or vomiting  Description: INTERVENTIONS:  - Administer IV fluids if ordered to ensure adequate hydration  - Maintain NPO status until nausea and vomiting are resolved  - Nasogastric tube if ordered  - Administer ordered antiemetic medications as needed  - Provide nonpharmacologic comfort measures as appropriate  - Advance diet as tolerated, if ordered  - Consider nutrition services referral to assist patient with adequate nutrition and appropriate food choices  10/12/2024 1338 by Aleida Pandey RN  Outcome: Adequate for Discharge  10/12/2024 1338 by Aleida Pandey RN  Outcome: Progressing  Goal: Maintains or returns to baseline bowel function  Description: INTERVENTIONS:  - Assess bowel function  - Encourage oral fluids to ensure adequate hydration  - Administer IV fluids if ordered to ensure adequate hydration  - Administer ordered medications as needed  - Encourage mobilization and activity  - Consider nutritional services referral to assist patient with adequate nutrition and appropriate food choices  10/12/2024 1338 by Aleida Pandey RN  Outcome: Adequate  for Discharge  10/12/2024 1338 by Aleida Pandey RN  Outcome: Progressing     Problem: GENITOURINARY - ADULT  Goal: Maintains or returns to baseline urinary function  Description: INTERVENTIONS:  - Assess urinary function  - Encourage oral fluids to ensure adequate hydration if ordered  - Administer IV fluids as ordered to ensure adequate hydration  - Administer ordered medications as needed  - Offer frequent toileting  - Follow urinary retention protocol if ordered  10/12/2024 1338 by Aleida Pandey RN  Outcome: Adequate for Discharge  10/12/2024 1338 by Aleida Pandey RN  Outcome: Progressing     Problem: METABOLIC, FLUID AND ELECTROLYTES - ADULT  Goal: Electrolytes maintained within normal limits  Description: INTERVENTIONS:  - Monitor labs and assess patient for signs and symptoms of electrolyte imbalances  - Administer electrolyte replacement as ordered  - Monitor response to electrolyte replacements, including repeat lab results as appropriate  - Instruct patient on fluid and nutrition as appropriate  10/12/2024 1338 by Aleida Pandey RN  Outcome: Adequate for Discharge  10/12/2024 1338 by Aleida Pandey RN  Outcome: Progressing

## 2024-10-12 NOTE — DISCHARGE SUMMARY
Discharge Summary - Hospitalist   Name: Irene Plascencia 64 y.o. female I MRN: [id number]  Unit/Bed#: -01 I Date of Admission: 10/11/2024   Date of Service: 10/12/2024 I Hospital Day: 1     Assessment & Plan  Acute respiratory failure with hypoxia (HCC) (Resolved: 10/13/2024)  Patient with recent admissions and treatments for pneumonia . Presenting with cough and SOB, reportedly requiring 2 L nasal cannula on arrival.   Chest x-ray: Lungs are grossly clear. No definite acute cardiopulmonary disease.   BNP within normal limits   Procal negative x 2   Pulmonary consult appreciated   No need for CT chest or antibiotics   Resolved without specific interventions, consider inaccurate pulse ox   Cough  Patient presented with persistent cough after recent admission/treatment for pneumonia   Pulmonary inputs appreciated, likely postviral   Continue with symptomatic and supportive cares   Hyponatremia  Appears to have history of chronic hyponatremia attributed to psych meds   Received 500 cc NSS on arrival   Resolved   Schizoaffective disorder (HCC)  Continue home dose Seroquel and Paxil   Primary hypertension  BP acceptable on home dose amlodipine  Esophagitis  Continue PPI.     Medical Problems       Resolved Problems  Date Reviewed: 10/9/2024   None       Discharging Physician / Practitioner: Yuni Urban PA-C  PCP: Raheem Cain MD  Admission Date:   Admission Orders (From admission, onward)       Ordered        10/11/24 1454  INPATIENT ADMISSION  Once                          Discharge Date: 10/12/24    Consultations During Hospital Stay:  Pulmonary     Procedures Performed:   None     Significant Findings / Test Results:   Outlined above     Incidental Findings:   None     Test Results Pending at Discharge (will require follow up):   None      Outpatient Tests Requested:  None     Complications:  none     Reason for Admission: hypoxia    Hospital Course:   Irene Plascencia is a 64 y.o. female patient  "who originally presented to the hospital on 10/11/2024 due to cough and SOB, patient was recent admitted and treated for aspiration pneumonia. On arrival required 2 L NC to maintain appropriate saturations. Admitted and seen in consultation by pulmonary. Workup essentially unremarkable an hypoxia resolved without specific interventions. Ambulatory pulse ox acceptable. She was discharged home and provided Rx for Mucinex and tessalon for cough management.     Please see above list of diagnoses and related plan for additional information.     Condition at Discharge: fair    Discharge Day Visit / Exam:   Subjective:  Patient reports feeling well, denies SOB.  Vitals: Blood Pressure: 120/77 (10/12/24 0731)  Pulse: 85 (10/12/24 0940)  Temperature: (!) 97.4 °F (36.3 °C) (10/12/24 0940)  Temp Source: Oral (10/11/24 1301)  Respirations: 19 (10/11/24 2143)  Height: 5' 2\" (157.5 cm) (10/11/24 1757)  Weight - Scale: 80.2 kg (176 lb 12.9 oz) (10/12/24 0600)  SpO2: 93 % (10/12/24 0940)  Physical Exam  Nursing note reviewed.   Constitutional:       General: She is not in acute distress.  Cardiovascular:      Rate and Rhythm: Normal rate.   Pulmonary:      Effort: Pulmonary effort is normal. No respiratory distress.      Breath sounds: No wheezing, rhonchi or rales.      Comments: On room air   Neurological:      Mental Status: She is alert.          Discussion with Family: Patient declined call to .     Discharge instructions/Information to patient and family:   See after visit summary for information provided to patient and family.      Provisions for Follow-Up Care:  See after visit summary for information related to follow-up care and any pertinent home health orders.      Mobility at time of Discharge:   Basic Mobility Inpatient Raw Score: 24  JH-HLM Goal: 8: Walk 250 feet or more  JH-HLM Achieved: 7: Walk 25 feet or more       Disposition:   Home    Planned Readmission: no    Discharge Medications:  See after " visit summary for reconciled discharge medications provided to patient and/or family.      Administrative Statements   Discharge Statement:  I have spent a total time of 20 minutes in caring for this patient on the day of the visit/encounter.     **Please Note: This note may have been constructed using a voice recognition system**

## 2024-10-12 NOTE — ASSESSMENT & PLAN NOTE
Appears to have history of chronic hyponatremia attributed to psych meds   Received 500 cc NSS on arrival   Resolved

## 2024-10-12 NOTE — ASSESSMENT & PLAN NOTE
Patient with recent admissions and treatments for pneumonia . Presenting with cough and SOB, reportedly requiring 2 L nasal cannula on arrival.   Chest x-ray: Lungs are grossly clear. No definite acute cardiopulmonary disease.   BNP within normal limits   Procal negative x 2   Pulmonary consult appreciated   No need for CT chest or antibiotics   Resolved without specific interventions, consider inaccurate pulse ox

## 2024-10-13 PROBLEM — R05.1 ACUTE COUGH: Status: RESOLVED | Noted: 2024-05-09 | Resolved: 2024-10-13

## 2024-10-13 PROBLEM — J96.01 ACUTE RESPIRATORY FAILURE WITH HYPOXIA (HCC): Status: RESOLVED | Noted: 2023-07-23 | Resolved: 2024-10-13

## 2024-10-13 LAB
ATRIAL RATE: 82 BPM
P AXIS: 44 DEGREES
PR INTERVAL: 130 MS
QRS AXIS: 3 DEGREES
QRSD INTERVAL: 80 MS
QT INTERVAL: 376 MS
QTC INTERVAL: 439 MS
T WAVE AXIS: 46 DEGREES
VENTRICULAR RATE: 82 BPM

## 2024-10-13 PROCEDURE — 93010 ELECTROCARDIOGRAM REPORT: CPT | Performed by: INTERNAL MEDICINE

## 2024-10-13 NOTE — ED ATTENDING ATTESTATION
10/11/2024  I, Moshe Stein MD, saw and evaluated the patient. I have discussed the patient with the resident/non-physician practitioner and agree with the resident's/non-physician practitioner's findings, Plan of Care, and MDM as documented in the resident's/non-physician practitioner's note, except where noted. All available labs and Radiology studies were reviewed.  I was present for key portions of any procedure(s) performed by the resident/non-physician practitioner and I was immediately available to provide assistance.       At this point I agree with the current assessment done in the Emergency Department.  I have conducted an independent evaluation of this patient a history and physical is as follows:    ED Course     Impression: Acute dyspnea, hypoxia, nausea vomiting  Differential diagnosis: ACS MI arrhythmia, electrolyte abnormality dehydration, at risk for pneumonia, at risk for new aspiration pneumonitis, viral syndrome    Plan treat patient's symptoms IV fluids antiemetics check ECG chest x-ray reassess    Chest x-ray independently interpreted by me: No acute cardiopulmonary disease    ECG independently interpreted by me normal sinus rhythm normal rate normal axis no mentals no acute ST-T changes.    Labs reviewed BMP remarkable for hyponatremia..  CBC unremarkable.  Initial troponin negative    Patient requiring O2 suspect likely aspiration event given history of nausea vomiting will admit to medicine for further evaluation management    Critical Care Time  Procedures

## 2024-10-14 ENCOUNTER — TRANSITIONAL CARE MANAGEMENT (OUTPATIENT)
Dept: INTERNAL MEDICINE CLINIC | Facility: CLINIC | Age: 64
End: 2024-10-14

## 2024-10-14 PROCEDURE — TCMXX

## 2024-10-14 NOTE — UTILIZATION REVIEW
Initial Clinical Review    Admission: Date/Time/Statement:   Admission Orders (From admission, onward)       Ordered        10/11/24 1454  INPATIENT ADMISSION  Once                          Orders Placed This Encounter   Procedures    INPATIENT ADMISSION     Standing Status:   Standing     Number of Occurrences:   1     Order Specific Question:   Level of Care     Answer:   Med Surg [16]     Order Specific Question:   Estimated length of stay     Answer:   More than 2 Midnights     Order Specific Question:   Certification     Answer:   I certify that inpatient services are medically necessary for this patient for a duration of greater than two midnights. See H&P and MD Progress Notes for additional information about the patient's course of treatment.     ED Arrival Information       Expected   -    Arrival   10/11/2024 12:56    Acuity   Urgent              Means of arrival   Ambulance    Escorted by   [facility]    Service   Hospitalist    Admission type   Emergency              Arrival complaint   SOB             Chief Complaint   Patient presents with    Shortness of Breath     Pt reports SOB that started yestreday, called EMS d/t symptoms worsening, per EMS pt vomited twice gave PO Zofran.       Initial Presentation: 64 y.o. female presents to ed from home on 10-11-24 fore evaluation and treatment of cough, shortness of breath.  Recent admission for aspiration pneumonia, esophagitis and gastroparesis. Imaging showed  L>R opacities compatible with infection possibly related to aspiration and chronic esophagitis.treated with iv cefepime and placed on dysphagia diet.PMHX: HTN, rosas's esophagus, anemia   Clinical assessment now with hypoxia spo2 low 90s on 2L O2 nc.  Initially treated with iv rocephin and iv azithromycin with iv ns due to sodium of 124. Admit to inpatient med surg for cough, hyponatremia (euvolemic), hypoxia  Plan includes: duo neg, wean  2L O2nc to ra, monitor electrolytes, iv ns,  reglan.     Anticipated Length of Stay/Certification Statement: Patient will be admitted on an inpatient basis with an anticipated length of stay of greater than 2 midnights secondary to presenting issues as outlined above.     Date: 10-12-24    Day 2: inpatient med surg  Chest x ray and procalcitonin are negative.  Antibiotics discontinued. Hyponatremia chronic due to psych meds now resolved.  Discharged to home       ED Treatment-Medication Administration from 10/11/2024 1256 to 10/11/2024 1750         Date/Time Order Dose Route Action     10/11/2024 1301 ondansetron (FOR EMS ONLY) (ZOFRAN-ODT) 4 mg dispersible tablet 4 mg   Given to EMS     10/11/2024 1452 sodium chloride 0.9 % bolus 500 mL 500 mL Intravenous New Bag     10/11/2024 1451 metoclopramide (REGLAN) injection 10 mg 10 mg Intravenous Given     10/11/2024 1451 ceftriaxone (ROCEPHIN) 1 g/50 mL in dextrose IVPB 1,000 mg Intravenous New Bag     10/11/2024 1558 azithromycin (ZITHROMAX) 500 mg in sodium chloride 0.9% 250mL IVPB 500 mg 500 mg Intravenous New Bag           ED Triage Vitals [10/11/24 1301]   Temperature Pulse Respirations Blood Pressure SpO2 Pain Score   98.4 °F (36.9 °C) 86 20 147/87 94 % No Pain     Weight (last 2 days) before discharge       Date/Time Weight    10/12/24 0600 80.2 (176.81)    10/11/24 17:57:49 83.2 (183.4)    10/11/24 1530 83.2 (183.4)            Vital Signs (last 3 days) before discharge       Date/Time Temp Pulse Resp BP MAP (mmHg) SpO2 O2 Device Pain    10/12/24 09:40:24 97.4 °F (36.3 °C) 85 -- -- -- 93 % -- --    10/12/24 0933 -- -- -- -- -- -- -- No Pain    10/12/24 07:31:30 -- 71 -- 120/77 91 91 % -- --    10/11/24 2300 -- -- -- -- -- -- None (Room air) No Pain    10/11/24 2233 -- -- -- -- -- -- None (Room air) --    10/11/24 2143 98 °F (36.7 °C) 67 19 117/68 84 91 % -- --    10/11/24 2057 -- -- -- -- -- -- -- No Pain    10/11/24 1804 -- -- -- -- -- -- -- No Pain    10/11/24 17:57:49 97.5 °F (36.4 °C) 75 18 133/80 98 92  % -- --    10/11/24 1705 -- -- -- -- -- 97 % -- --    10/11/24 1301 98.4 °F (36.9 °C) 86 20 147/87 -- 94 % None (Room air) No Pain       Pertinent Labs/Diagnostic Test Results:     Radiology:  XR chest 2 views   Final (10/11 1451)      Lungs are grossly clear. No definite acute cardiopulmonary disease.        Cardiology:  ECG 12 lead   Final (10/13 1818)   Normal sinus rhythm   Possible Anterolateral infarct (cited on or before 28-SEP-2024)   Abnormal ECG           GI:  No orders to display           Results from last 7 days   Lab Units 10/12/24  0558 10/11/24  1320   WBC Thousand/uL 2.49* 4.77   HEMOGLOBIN g/dL 12.6 13.4   HEMATOCRIT % 39.6 40.8   PLATELETS Thousands/uL 259 322   TOTAL NEUT ABS Thousands/µL 1.27* 3.74         Results from last 7 days   Lab Units 10/12/24  0558 10/12/24  0013 10/11/24  1859 10/11/24  1320   SODIUM mmol/L 135 132* 133* 124*   POTASSIUM mmol/L 4.2 3.7 3.6 3.8   CHLORIDE mmol/L 99 97 97 90*   CO2 mmol/L 29 27 28 24   ANION GAP mmol/L 7 8 8 10   BUN mg/dL 7 6 6 6   CREATININE mg/dL 0.72 0.61 0.69 0.58*   EGFR ml/min/1.73sq m 88 96 92 97   CALCIUM mg/dL 9.5 9.3 9.4 9.7     Results from last 7 days   Lab Units 10/12/24  0558 10/11/24  1320   AST U/L 13 18   ALT U/L 10 12   ALK PHOS U/L 75 83   TOTAL PROTEIN g/dL 6.4 7.4   ALBUMIN g/dL 4.0 4.5   TOTAL BILIRUBIN mg/dL 0.37 0.47         Results from last 7 days   Lab Units 10/12/24  0558 10/12/24  0013 10/11/24  1859 10/11/24  1320   GLUCOSE RANDOM mg/dL 89 95 85 96     Results from last 7 days   Lab Units 10/11/24  1727 10/11/24  1616 10/11/24  1320   HS TNI 0HR ng/L  --   --  <2   HS TNI 2HR ng/L  --  4  --    HSTNI D2 ng/L  --  >2  --    HS TNI 4HR ng/L <2  --   --      Results from last 7 days   Lab Units 10/11/24  1320   TSH 3RD GENERATON uIU/mL 1.444     Results from last 7 days   Lab Units 10/11/24  1320   PROCALCITONIN ng/ml <0.05     Results from last 7 days   Lab Units 10/11/24  1320   BNP pg/mL 15     Results from last 7 days    Lab Units 10/11/24  1320   LIPASE u/L 10*     Results from last 7 days   Lab Units 10/11/24  1320   SED RATE mm/hour 46*       Results from last 7 days   Lab Units 10/12/24  0752   CLARITY UA  Clear   COLOR UA  Colorless   SPEC GRAV UA  1.006   PH UA  6.0   GLUCOSE UA mg/dl Negative   KETONES UA mg/dl Negative   BLOOD UA  Negative   PROTEIN UA mg/dl Negative   NITRITE UA  Negative   BILIRUBIN UA  Negative   UROBILINOGEN UA (BE) mg/dl <2.0   LEUKOCYTES UA  Negative   SODIUM UR  17   CREATININE UR mg/dL 62.0     Past Medical History:   Diagnosis Date    Anemia     Anxiety     Arthritis     Back pain at L4-L5 level     Schreiber esophagus     Bulimia     Colon polyp     Disease of thyroid gland     hypothyroid    GERD (gastroesophageal reflux disease)     Hearing loss in left ear     History of transfusion     Hyperlipidemia     Hypertension     Hypothyroidism     Leukopenia     NMS (neuroleptic malignant syndrome) 01/03/2020    Pneumonia     RA (rheumatoid arthritis) (HCC)     in feet    Sciatica     Seizure (HCC)     due to medication mix up 8/2018 only one historically    Skin spots, red     lower right arm - poss from cat- no s/s infection    Synovial cyst of lumbar spine     Vertigo     Wears dentures     full set    Weight gain      Present on Admission:   Cough   Hyponatremia   Esophagitis   (Resolved) Acute respiratory failure with hypoxia (HCC)   Primary hypertension   Acquired hypothyroidism   Gastroparesis   Schizoaffective disorder (HCC)      Admitting Diagnosis:     Shortness of breath [R06.02]  Hyponatremia [E87.1]  Aspiration pneumonia (HCC) [J69.0]  Vomiting [R11.10]  Dyspnea [R06.00]  Hypoxia [R09.02]    Age/Sex: 64 y.o. female    Network Utilization Review Department  ATTENTION: Please call with any questions or concerns to [phone] and carefully listen to the prompts so that you are directed to the right person. All voicemails are confidential.   For Discharge needs, contact Care Management [facility]  Support Team at [phone] opt. 2  Send all requests for admission clinical reviews, approved or denied determinations and any other requests to dedicated fax number below belonging to the campus where the patient is receiving treatment. List of dedicated fax numbers for the Facilities:  FACILITY NAME UR FAX NUMBER   ADMISSION DENIALS (Administrative/Medical Necessity) [phone]   DISCHARGE SUPPORT TEAM (NETWORK) [phone]   PARENT CHILD HEALTH (Maternity/NICU/Pediatrics) [phone]   [facility] [phone]   [facility] [phone]   [facility] [phone]   [facility] [phone]   [facility] [phone]   [facility] [phone]   [facility] [phone]   [facility] [phone]   [facility] [phone]   [facility] [phone]   [facility] [phone]   [facility] [phone]

## 2024-10-16 ENCOUNTER — TELEPHONE (OUTPATIENT)
Dept: GASTROENTEROLOGY | Facility: CLINIC | Age: 64
End: 2024-10-16

## 2024-10-17 ENCOUNTER — TELEPHONE (OUTPATIENT)
Dept: INTERNAL MEDICINE CLINIC | Facility: CLINIC | Age: 64
End: 2024-10-17

## 2024-10-17 NOTE — TELEPHONE ENCOUNTER
Asked patient if vomiting is from phlegm she is not sure but she reports she has been vomiting all night projectile vomiting. She is having nausea but not sure if it is from vomiting. Advise patient that x-ray was clear. Patient reports she is trying to stay hydrated.  Please advise

## 2024-10-17 NOTE — TELEPHONE ENCOUNTER
Patient called back and was advised of the Dr.Reyes message.    Lea Reyes, MD   to Lori Rivera MA        10/17/24  1:08 PM  Note     That projectile vomiting is new so I recommend she go to the ER

## 2024-10-17 NOTE — TELEPHONE ENCOUNTER
Called patient to complete tcm process. Patient reports she had a bad night last nigh says she was vomiting phlegm that's coming from her lungs with post nasal drip. She is using pearls for cough this this is not helping her. She did not want to go back to ER because she reports she been there twice in the past two months. She asked if there is anything that can be sent to the pharmacy for her?  At her last discharge she reports she only was given 1 day of antibodies.     Please review and advise

## 2024-10-17 NOTE — TELEPHONE ENCOUNTER
Is she vomiting phlegm from coughing so much?  Or does she have nausea that she is vomiting?  I do not have another cough medicine to offer her at this point.  There is no indication from her last chest x-ray, which was clear to start her on an antibiotic.

## 2024-10-21 DIAGNOSIS — R11.2 NAUSEA AND VOMITING, UNSPECIFIED VOMITING TYPE: Primary | ICD-10-CM

## 2024-10-21 DIAGNOSIS — R11.2 NAUSEA AND VOMITING, UNSPECIFIED VOMITING TYPE: ICD-10-CM

## 2024-10-21 RX ORDER — ONDANSETRON 4 MG/1
4 TABLET, FILM COATED ORAL EVERY 8 HOURS PRN
Qty: 20 TABLET | Refills: 0 | Status: SHIPPED | OUTPATIENT
Start: 2024-10-21 | End: 2024-10-21 | Stop reason: SDUPTHER

## 2024-10-21 RX ORDER — ONDANSETRON 4 MG/1
4 TABLET, FILM COATED ORAL EVERY 8 HOURS PRN
Qty: 20 TABLET | Refills: 0 | Status: SHIPPED | OUTPATIENT
Start: 2024-10-21

## 2024-10-21 NOTE — TELEPHONE ENCOUNTER
Called in to make an appointment with  Dr Cain. She says she has been throwing up and has phlem and mucus coming up. She made an appointment because she is worried if she goes to the hospital again they won't give her care. She would like a call back at her home number [phone] if anyone needs to speak with her

## 2024-10-21 NOTE — TELEPHONE ENCOUNTER
Advised patient of zofran but needs it sent to [facility]. Looks like when she was in hospital they deleted this from her pharmacy list.

## 2024-10-27 PROBLEM — J69.0 ASPIRATION PNEUMONIA OF LEFT LOWER LOBE (HCC): Status: RESOLVED | Noted: 2020-01-02 | Resolved: 2024-10-27

## 2024-10-28 RX ORDER — CELECOXIB 200 MG/1
200 CAPSULE ORAL DAILY
COMMUNITY
Start: 2024-10-18

## 2024-10-29 ENCOUNTER — OFFICE VISIT (OUTPATIENT)
Dept: INTERNAL MEDICINE CLINIC | Facility: CLINIC | Age: 64
End: 2024-10-29
Payer: COMMERCIAL

## 2024-10-29 VITALS
OXYGEN SATURATION: 95 % | DIASTOLIC BLOOD PRESSURE: 100 MMHG | BODY MASS INDEX: 34.23 KG/M2 | HEART RATE: 86 BPM | SYSTOLIC BLOOD PRESSURE: 150 MMHG | WEIGHT: 186 LBS | HEIGHT: 62 IN | RESPIRATION RATE: 18 BRPM

## 2024-10-29 DIAGNOSIS — J01.00 ACUTE NON-RECURRENT MAXILLARY SINUSITIS: Primary | ICD-10-CM

## 2024-10-29 PROCEDURE — G2211 COMPLEX E/M VISIT ADD ON: HCPCS | Performed by: INTERNAL MEDICINE

## 2024-10-29 PROCEDURE — 99213 OFFICE O/P EST LOW 20 MIN: CPT | Performed by: INTERNAL MEDICINE

## 2024-10-29 NOTE — PROGRESS NOTES
Ambulatory Visit  Name: Irene Plascencia      : 1960      MRN: [id number]  Encounter Provider: Raheem Cain MD  Encounter Date: 10/29/2024   Enc[facility] department: [facility]    Assessment & Plan  Acute non-recurrent maxillary sinusitis  Will treat with Augmentin.  Patient can also use over-the-counter Flonase              History of Present Illness     Pt reports sinusitis symptoms for about two weeks.      Cough  Associated symptoms include postnasal drip and rhinorrhea. Pertinent negatives include no chills or fever.     Review of Systems   Constitutional:  Negative for chills and fever.   HENT:  Positive for congestion, postnasal drip, rhinorrhea and sinus pressure.    Respiratory:  Positive for cough.      Past Medical History:   Diagnosis Date    Anemia     Anxiety     Arthritis     Back pain at L4-L5 level     Schreiber esophagus     Bulimia     Colon polyp     Disease of thyroid gland     hypothyroid    GERD (gastroesophageal reflux disease)     Hearing loss in left ear     History of transfusion     Hyperlipidemia     Hypertension     Hypothyroidism     Leukopenia     NMS (neuroleptic malignant syndrome) 2020    Pneumonia     RA (rheumatoid arthritis) (HCC)     in feet    Sciatica     Seizure (HCC)     due to medication mix up 2018 only one historically    Skin spots, red     lower right arm - poss from cat- no s/s infection    Synovial cyst of lumbar spine     Vertigo     Wears dentures     full set    Weight gain      Past Surgical History:   Procedure Laterality Date    BONE MARROW BIOPSY      BREAST SURGERY      enlargement with implants    CLOSED REDUCTION DISTAL FEMUR FRACTURE      COLONOSCOPY      COSMETIC SURGERY      DENTAL SURGERY      HIP ARTHROPLASTY Right 2020    Procedure: REVISION TOTAL HIP ARTHROPLASTY WITH REPAIR OF PARIPROSTHETIC FRACTURE - POSTERIOR APPROACH;  Surgeon: Dilan Pedersen MD;  Location: [facility];  Service: Orthopedics    CT  AMPUTATION METATARSAL W/TOE SINGLE Left 04/07/2023    Procedure: AMPUTATION TOE 4th;  Surgeon: Conner Bartlett DPM;  Location:  MAIN OR;  Service: Podiatry    CA ARTHRP ACETBLR/PROX FEM PROSTC AGRFT/ALGRFT Right 12/11/2019    Procedure: ARTHROPLASTY HIP TOTAL ANTERIOR;  Surgeon: Dilan Pedersen MD;  Location:  MAIN OR;  Service: Orthopedics    CA ESOPHAGOGASTRODUODENOSCOPY TRANSORAL DIAGNOSTIC N/A 05/11/2021    Procedure: ESOPHAGOGASTRODUODENOSCOPY (EGD);  Surgeon: David Cedeño MD;  Location: [facility];  Service: General    CA LAPS RPR PARAESPHGL HRNA INCL FUNDPLSTY W/MESH N/A 05/11/2021    Procedure: REPAIR HERNIA PARAESOPHAGEAL  LAPAROSCOPIC;  Surgeon: David Cedeño MD;  [facility]: [facility];  Service: General    CA UNLISTED PROCEDURE ESOPHAGUS N/A 05/11/2021    Procedure: FUNDOPLICATION TRANSORAL INCISIONLESS;  Surgeon: Brad Cadet MD;  Location:  MAIN OR;  Service: Gastroenterology    RHINOPLASTY      SINUS SURGERY      TOE AMPUTATION Left     2023 4th toe    TRANSORAL INCISIONLESS FUNDOPLICATION (TIF)  05/11/2021     Family History   Problem Relation Age of Onset    Uterine cancer Mother     Esophageal cancer Father     Colon cancer Maternal Grandmother      Social History     Tobacco Use    Smoking status: Never     Passive exposure: Past    Smokeless tobacco: Never   Vaping Use    Vaping status: Never Used   Substance and Sexual Activity    Alcohol use: Not Currently    Drug use: Not Currently    Sexual activity: Not Currently     Current Outpatient Medications on File Prior to Visit   Medication Sig    amLODIPine (NORVASC) 5 mg tablet TAKE 1 TABLET (5 MG TOTAL) BY MOUTH DAILY.    celecoxib (CeleBREX) 200 mg capsule Take 200 mg by mouth daily    levothyroxine 25 mcg tablet TAKE 1 TABLET BY MOUTH EVERY DAY    LORazepam (ATIVAN) 2 mg tablet Take 1 tablet (2 mg total) by mouth every 4 (four) hours as needed for anxiety    ondansetron (ZOFRAN) 4 mg tablet Take 1 tablet (4 mg total) by mouth every 8  (eight) hours as needed for nausea or vomiting    pantoprazole (PROTONIX) 40 mg tablet TAKE 1 TABLET BY MOUTH TWICE A DAY    PARoxetine (PAXIL) 30 mg tablet TAKE 2 TABLETS (60 MG TOTAL) BY MOUTH IN THE MORNING    QUEtiapine (SEROquel XR) 400 mg 24 hr tablet TAKE 1 TABLET (400 MG TOTAL) BY MOUTH DAILY AT BEDTIME    sucralfate (CARAFATE) 1 g tablet TAKE 1 TABLET (1 G TOTAL) BY MOUTH 3 (THREE) TIMES A DAY WITH MEALS    benzonatate (TESSALON) 200 MG capsule Take 1 capsule (200 mg total) by mouth 3 (three) times a day as needed for cough (Patient not taking: Reported on 10/29/2024)    metoclopramide (REGLAN) 5 mg tablet Take 2 tablets (10 mg total) by mouth 3 (three) times a day before meals     Allergies   Allergen Reactions    Latex Anaphylaxis, Rash and Tongue Swelling     Band aids, adhesives wears normal underwear, can eat bananas  USE PAPER TAPE    Risperdal [Risperidone] Shortness Of Breath     Rapid heart beat, SOB    Zyprexa [Olanzapine] Shortness Of Breath     Rapid heartbeat     Immunization History   Administered Date(s) Administered    COVID-19 PFIZER VACCINE 0.3 ML IM 03/20/2021, 04/10/2021, 10/09/2021    COVID-19 Pfizer Vac BIVALENT Jt-sucrose 12 Yr+ IM 09/10/2022    COVID-19 Pfizer mRNA vacc PF jt-sucrose 12 yr and older (Comirnaty) 10/04/2023    COVID-19 Pfizer vac (Jt-sucrose, gray cap) 12 yr+ IM 05/30/2022    H1N1, All Formulations 11/17/2009    INFLUENZA 11/17/2009, 01/04/2013, 11/11/2014, 10/20/2015, 10/31/2016, 09/16/2018, 11/07/2022    Influenza Injectable, MDCK, Preservative Free, Quadrivalent, 0.5 mL 09/26/2019    Influenza Quadrivalent, 6-35 Months IM 10/31/2016    Influenza Recombinant Preservative Free Im 10/09/2024    Influenza, injectable, quadrivalent, preservative free 0.5 mL 09/05/2020, 09/26/2023    Influenza, recombinant, quadrivalent,injectable, preservative free 11/04/2021    Pneumococcal Conjugate 13-Valent 09/16/2018    Pneumococcal Conjugate Vaccine 20-valent (Pcv20),  "Polysace 04/26/2024    Rabies 07/29/2014, 08/01/2014, 08/05/2014    Tdap 07/29/2014, 12/17/2022, 06/16/2023     Objective     /100 (BP Location: Left arm, Patient Position: Sitting, Cuff Size: Standard)   Pulse 86   Resp 18   Ht 5' 2\" (1.575 m)   Wt 84.4 kg (186 lb)   SpO2 95%   BMI 34.02 kg/m²     Physical Exam  Constitutional:       General: She is not in acute distress.     Appearance: Normal appearance.   HENT:      Right Ear: Tympanic membrane, ear canal and external ear normal. There is no impacted cerumen.      Left Ear: Tympanic membrane, ear canal and external ear normal. There is no impacted cerumen.      Mouth/Throat:      Mouth: Mucous membranes are moist.      Pharynx: No oropharyngeal exudate or posterior oropharyngeal erythema.   Eyes:      General:         Right eye: No discharge.         Left eye: No discharge.      Conjunctiva/sclera: Conjunctivae normal.   Pulmonary:      Effort: Pulmonary effort is normal. No respiratory distress.      Breath sounds: Normal breath sounds. No stridor. No wheezing, rhonchi or rales.   Neurological:      Mental Status: She is alert.         "

## 2024-10-29 NOTE — PATIENT INSTRUCTIONS
Problem List Items Addressed This Visit          Respiratory    Acute non-recurrent maxillary sinusitis - Primary     Will treat with Augmentin.  Patient can also use over-the-counter Flonase

## 2024-10-30 ENCOUNTER — NURSE TRIAGE (OUTPATIENT)
Age: 64
End: 2024-10-30

## 2024-10-30 NOTE — TELEPHONE ENCOUNTER
"Patient called in report she is having a panic attack after review of bills (cable,  and water possible cut off) and having financial problems; SS disabilty at $1000.00 per month. Also concerned about $300 monthly rent; trying to sell house; and recent multiple hospitalizations for pneumonia. Has ongoing   Lawsuit with [employer] for heavy rack that fell on her July 2023 and feels they are dragging out lawsuit and needs it resolved by end of year. Patient feels like Ativan is not working; last taken within past 30 minutes of call.     RN spend 20 minutes on call with patient to discuss her plans for sale of house, reaching out to [employer] service to see how to reduce bill, and contact 's office to review her anticipated resolution of case. She is hydrating  tolerating PO to keep self nourished.  After developing some strategy with the patient, she reports she is feeling better, will take an nap and get up for dinner and next dose of Ativan. Please follow up with patient for provider status update, questions/concern, or OV.    Reason for Disposition   MILD anxiety symptoms (e.g., Anxiety symptoms are mild and intermittent; symptoms do not interfere with daily activities)    Answer Assessment - Initial Assessment Questions  1. CONCERN: \"Did anything happen that prompted you to call today?\"       Review of monthly bills and income  2. ANXIETY SYMPTOMS: \"Can you describe how you (your loved one; patient) have been feeling?\" (e.g., tense, restless, panicky, anxious, keyed up, overwhelmed, sense of impending doom).       Possible stop of cable and water/ service  3. ONSET: \"How long have you been feeling this way?\" (e.g., hours, days, weeks)      Felt overwhelmed this afternoon  4. SEVERITY: \"How would you rate the level of anxiety?\" (e.g., 0 - 10; or mild, moderate, severe).      moderate  5. FUNCTIONAL IMPAIRMENT: \"How have these feelings affected your ability to do daily activities?\" \"Have you had more " "difficulty than usual doing your normal daily activities?\" (e.g., getting better, same, worse; self-care, school, work, interactions)      denies  6. HISTORY: \"Have you felt this way before?\" \"Have you ever been diagnosed with an anxiety problem in the past?\" (e.g., generalized anxiety disorder, panic attacks, PTSD). If Yes, ask: \"How was this problem treated?\" (e.g., medicines, counseling, etc.)      Yes, diagnosis of anxiety  7. RISK OF HARM - SUICIDAL IDEATION: \"Do you ever have thoughts of hurting or killing yourself?\" If Yes, ask:  \"Do you have these feelings now?\" \"Do you have a plan on how you would do this?\"      denies  8. TREATMENT:  \"What has been done so far to treat this anxiety?\" (e.g., medicines, relaxation strategies). \"What has helped?\"      Ativan, Paxil, and Seroquel prescribes  9. THERAPIST: \"Do you have a counselor or therapist?\" If Yes, ask: \"What is their name?\"      Unknown; all medications prescribed by PCP  10. POTENTIAL TRIGGERS: \"Do you drink caffeinated beverages (e.g., coffee, paola, teas), and how much daily?\" \"Do you drink alcohol or use any drugs?\" \"Have you started any new medicines recently?\"        N/a  11. PATIENT SUPPORT: \"Who is with you now?\" \"Who do you live with?\" \"Do you have family or friends who you can talk to?\"         Lives alone; talked with RN on phone  12. OTHER SYMPTOMS: \"Do you have any other symptoms?\" (e.g., feeling depressed, trouble concentrating, trouble sleeping, trouble breathing, palpitations or fast heartbeat, chest pain, sweating, nausea, or diarrhea)        Anxious; overwhelmed  13. PREGNANCY: \"Is there any chance you are pregnant?\" \"When was your last menstrual period?\"        Post menopausal    Protocols used: Anxiety and Panic Attack-Adult-[place]    "

## 2024-11-07 ENCOUNTER — TELEPHONE (OUTPATIENT)
Age: 64
End: 2024-11-07

## 2024-11-07 NOTE — TELEPHONE ENCOUNTER
The patient called does she need a referral for a weight loss program   or is Dr Cain able to prescribe the medication  please advise thank you

## 2024-11-08 ENCOUNTER — TELEPHONE (OUTPATIENT)
Dept: INTERNAL MEDICINE CLINIC | Facility: CLINIC | Age: 64
End: 2024-11-08

## 2024-11-08 DIAGNOSIS — E66.811 CLASS 1 OBESITY WITHOUT SERIOUS COMORBIDITY WITH BODY MASS INDEX (BMI) OF 34.0 TO 34.9 IN ADULT, UNSPECIFIED OBESITY TYPE: Primary | ICD-10-CM

## 2024-11-08 RX ORDER — SEMAGLUTIDE 0.25 MG/.5ML
INJECTION, SOLUTION SUBCUTANEOUS
Qty: 2 ML | Refills: 0 | Status: SHIPPED | OUTPATIENT
Start: 2024-11-08

## 2024-11-08 NOTE — TELEPHONE ENCOUNTER
Reason for call:   [x] Prior Auth  [] Other:     Caller:  [] Patient  [x] Pharmacy  Name: [id number]  Address: [facility] [address] - [place]  Callback Number: [phone]        Ordering Provider:   [x] PCP/Provider - [facility]  [] Speciality/Provider -

## 2024-11-10 PROBLEM — J69.0 ASPIRATION PNEUMONIA (HCC): Status: RESOLVED | Noted: 2024-09-16 | Resolved: 2024-11-10

## 2024-11-10 PROBLEM — R05.9 COUGH: Status: RESOLVED | Noted: 2022-09-21 | Resolved: 2024-11-10

## 2024-11-11 DIAGNOSIS — R05.1 ACUTE COUGH: ICD-10-CM

## 2024-11-11 RX ORDER — BENZONATATE 200 MG/1
200 CAPSULE ORAL 3 TIMES DAILY PRN
Qty: 20 CAPSULE | Refills: 1 | Status: SHIPPED | OUTPATIENT
Start: 2024-11-11 | End: 2024-11-21 | Stop reason: SDUPTHER

## 2024-11-11 NOTE — TELEPHONE ENCOUNTER
LINDA Arias 0.25 MG/0.5ML SUBMITTED     to [facility]     via    []CMM-KEY:    [x]Surescripts-Case ID # [id number]   []Availity-Auth ID #  NDC #    []Faxed to plan   []Other website    []Phone call Case ID #      []PA sent as URGENT    All office notes, labs and other pertaining documents and studies sent. Clinical questions answered. Awaiting determination from insurance company.     Turnaround time for your insurance to make a decision on your Prior Authorization can take 7-21 business days.

## 2024-11-11 NOTE — TELEPHONE ENCOUNTER
[place] [employer] 0.25 MG/0.5ML DENIED    Reason:(Screenshot if applicable)        Message sent to office clinical pool Yes    Denial letter scanned into Media No      Appeal started No (Provider will need to decide if appeal is warranted and send clinical documentation to Prior Authorization Team for initiation.)    **Please follow up with your patient regarding denial and next steps**

## 2024-11-13 NOTE — PROGRESS NOTES
Pt presented to the office requesting 2 more surgical shoes as the one she has is very worn and she will not be having surgery until April 7, 2023  Dispensed 2 women's small surgical shoes to patient  [employer] form completed and signed 
yourself at home?  Regular exams to look for colon polyps are the best way to prevent polyps from turning into colon cancer. These can include stool tests, sigmoidoscopy, colonoscopy, and CT colonography. Talk with your doctor about a testing schedule that is right for you.  To prevent polyps  There is no home treatment that can prevent colon polyps. But these steps may help lower your risk for cancer.  Stay active. Being active can help you get to and stay at a healthy weight. Try to exercise on most days of the week. Walking is a good choice.  Eat well. Choose a variety of vegetables, fruits, legumes (such as peas and beans), fish, poultry, and whole grains.  Do not smoke. If you need help quitting, talk to your doctor about stop-smoking programs and medicines. These can increase your chances of quitting for good.  If you drink alcohol, limit how much you drink. Limit alcohol to 2 drinks a day for men and 1 drink a day for women.  When should you call for help?   Call your doctor now or seek immediate medical care if:    You have severe belly pain.     Your stools are maroon or very bloody.   Watch closely for changes in your health, and be sure to contact your doctor if:    You have a fever.     You have nausea or vomiting.     You have a change in bowel habits (new constipation or diarrhea).     Your symptoms get worse or are not improving as expected.   Where can you learn more?  Go to https://www.Linkfluence.net/patientEd and enter C571 to learn more about \"Colon Polyps: Care Instructions.\"  Current as of: October 19, 2023  Content Version: 14.2  © 2024 [employer].   Care instructions adapted under license by [facility] [employer]. If you have questions about a medical condition or this instruction, always ask your healthcare professional. [employer] disclaims any warranty or liability for your use of this information.

## 2024-11-21 DIAGNOSIS — R11.2 NAUSEA AND VOMITING, UNSPECIFIED VOMITING TYPE: ICD-10-CM

## 2024-11-21 DIAGNOSIS — R05.1 ACUTE COUGH: ICD-10-CM

## 2024-11-21 RX ORDER — ONDANSETRON 4 MG/1
4 TABLET, FILM COATED ORAL EVERY 8 HOURS PRN
Qty: 20 TABLET | Refills: 3 | Status: SHIPPED | OUTPATIENT
Start: 2024-11-21

## 2024-11-21 RX ORDER — BENZONATATE 200 MG/1
200 CAPSULE ORAL 3 TIMES DAILY PRN
Qty: 30 CAPSULE | Refills: 5 | Status: SHIPPED | OUTPATIENT
Start: 2024-11-21

## 2024-11-21 NOTE — TELEPHONE ENCOUNTER
Pt called in stating that she ran out of the tessalon pearls on Tuesday. Pt states that she has been nauseous and vomiting phlegm since Tuesday. Unsure if vomiting from coughing or from nausea. Pt states that this has been an ongoing issus. Please advise.

## 2024-11-21 NOTE — TELEPHONE ENCOUNTER
Pt called to request Rx for:    Requested Prescriptions     Pending Prescriptions Disp Refills    ondansetron (ZOFRAN) 4 mg tablet 20 tablet 0     Sig: Take 1 tablet (4 mg total) by mouth every 8 (eight) hours as needed for nausea or vomiting     [facility] [address]

## 2024-11-22 ENCOUNTER — APPOINTMENT (EMERGENCY)
Dept: RADIOLOGY | Facility: HOSPITAL | Age: 64
End: 2024-11-22
Payer: COMMERCIAL

## 2024-11-22 ENCOUNTER — HOSPITAL ENCOUNTER (EMERGENCY)
Facility: HOSPITAL | Age: 64
Discharge: HOME | End: 2024-11-22
Attending: EMERGENCY MEDICINE
Payer: COMMERCIAL

## 2024-11-22 VITALS
HEART RATE: 78 BPM | RESPIRATION RATE: 15 BRPM | SYSTOLIC BLOOD PRESSURE: 148 MMHG | TEMPERATURE: 98.8 F | DIASTOLIC BLOOD PRESSURE: 86 MMHG | OXYGEN SATURATION: 93 %

## 2024-11-22 DIAGNOSIS — R11.2 NAUSEA & VOMITING: Primary | ICD-10-CM

## 2024-11-22 DIAGNOSIS — K11.7 INCREASED OROPHARYNGEAL SECRETIONS: ICD-10-CM

## 2024-11-22 LAB
ALBUMIN SERPL BCG-MCNC: 4.3 G/DL (ref 3.5–5)
ALP SERPL-CCNC: 80 U/L (ref 34–104)
ALT SERPL W P-5'-P-CCNC: 15 U/L (ref 7–52)
ANION GAP SERPL CALCULATED.3IONS-SCNC: 8 MMOL/L (ref 4–13)
AST SERPL W P-5'-P-CCNC: 16 U/L (ref 13–39)
ATRIAL RATE: 72 BPM
BASOPHILS # BLD AUTO: 0.04 THOUSANDS/ÂΜL (ref 0–0.1)
BASOPHILS NFR BLD AUTO: 1 % (ref 0–1)
BILIRUB SERPL-MCNC: 0.35 MG/DL (ref 0.2–1)
BUN SERPL-MCNC: 4 MG/DL (ref 5–25)
CALCIUM SERPL-MCNC: 8.9 MG/DL (ref 8.4–10.2)
CHLORIDE SERPL-SCNC: 96 MMOL/L (ref 96–108)
CO2 SERPL-SCNC: 27 MMOL/L (ref 21–32)
CREAT SERPL-MCNC: 0.62 MG/DL (ref 0.6–1.3)
EOSINOPHIL # BLD AUTO: 0.08 THOUSAND/ÂΜL (ref 0–0.61)
EOSINOPHIL NFR BLD AUTO: 2 % (ref 0–6)
ERYTHROCYTE [DISTWIDTH] IN BLOOD BY AUTOMATED COUNT: 13.8 % (ref 11.6–15.1)
GFR SERPL CREATININE-BSD FRML MDRD: 95 ML/MIN/1.73SQ M
GLUCOSE SERPL-MCNC: 94 MG/DL (ref 65–140)
HCT VFR BLD AUTO: 37.3 % (ref 34.8–46.1)
HGB BLD-MCNC: 12.5 G/DL (ref 11.5–15.4)
IMM GRANULOCYTES # BLD AUTO: 0.01 THOUSAND/UL (ref 0–0.2)
IMM GRANULOCYTES NFR BLD AUTO: 0 % (ref 0–2)
LIPASE SERPL-CCNC: 11 U/L (ref 11–82)
LYMPHOCYTES # BLD AUTO: 0.38 THOUSANDS/ÂΜL (ref 0.6–4.47)
LYMPHOCYTES NFR BLD AUTO: 10 % (ref 14–44)
MCH RBC QN AUTO: 27.9 PG (ref 26.8–34.3)
MCHC RBC AUTO-ENTMCNC: 33.5 G/DL (ref 31.4–37.4)
MCV RBC AUTO: 83 FL (ref 82–98)
MONOCYTES # BLD AUTO: 0.32 THOUSAND/ÂΜL (ref 0.17–1.22)
MONOCYTES NFR BLD AUTO: 9 % (ref 4–12)
NEUTROPHILS # BLD AUTO: 2.93 THOUSANDS/ÂΜL (ref 1.85–7.62)
NEUTS SEG NFR BLD AUTO: 78 % (ref 43–75)
NRBC BLD AUTO-RTO: 0 /100 WBCS
P AXIS: 51 DEGREES
PLATELET # BLD AUTO: 289 THOUSANDS/UL (ref 149–390)
PMV BLD AUTO: 8.5 FL (ref 8.9–12.7)
POTASSIUM SERPL-SCNC: 3.9 MMOL/L (ref 3.5–5.3)
PR INTERVAL: 128 MS
PROT SERPL-MCNC: 6.9 G/DL (ref 6.4–8.4)
QRS AXIS: 20 DEGREES
QRSD INTERVAL: 76 MS
QT INTERVAL: 368 MS
QTC INTERVAL: 402 MS
RBC # BLD AUTO: 4.48 MILLION/UL (ref 3.81–5.12)
SODIUM SERPL-SCNC: 131 MMOL/L (ref 135–147)
T WAVE AXIS: 58 DEGREES
VENTRICULAR RATE: 72 BPM
WBC # BLD AUTO: 3.76 THOUSAND/UL (ref 4.31–10.16)

## 2024-11-22 PROCEDURE — 96375 TX/PRO/DX INJ NEW DRUG ADDON: CPT

## 2024-11-22 PROCEDURE — 99284 EMERGENCY DEPT VISIT MOD MDM: CPT

## 2024-11-22 PROCEDURE — 85025 COMPLETE CBC W/AUTO DIFF WBC: CPT | Performed by: STUDENT IN AN ORGANIZED HEALTH CARE EDUCATION/TRAINING PROGRAM

## 2024-11-22 PROCEDURE — 96374 THER/PROPH/DIAG INJ IV PUSH: CPT

## 2024-11-22 PROCEDURE — 93010 ELECTROCARDIOGRAM REPORT: CPT | Performed by: INTERNAL MEDICINE

## 2024-11-22 PROCEDURE — 83690 ASSAY OF LIPASE: CPT | Performed by: STUDENT IN AN ORGANIZED HEALTH CARE EDUCATION/TRAINING PROGRAM

## 2024-11-22 PROCEDURE — 36415 COLL VENOUS BLD VENIPUNCTURE: CPT | Performed by: STUDENT IN AN ORGANIZED HEALTH CARE EDUCATION/TRAINING PROGRAM

## 2024-11-22 PROCEDURE — 96361 HYDRATE IV INFUSION ADD-ON: CPT

## 2024-11-22 PROCEDURE — 74177 CT ABD & PELVIS W/CONTRAST: CPT

## 2024-11-22 PROCEDURE — 71046 X-RAY EXAM CHEST 2 VIEWS: CPT

## 2024-11-22 PROCEDURE — 99285 EMERGENCY DEPT VISIT HI MDM: CPT | Performed by: EMERGENCY MEDICINE

## 2024-11-22 PROCEDURE — 93005 ELECTROCARDIOGRAM TRACING: CPT

## 2024-11-22 PROCEDURE — 80053 COMPREHEN METABOLIC PANEL: CPT | Performed by: STUDENT IN AN ORGANIZED HEALTH CARE EDUCATION/TRAINING PROGRAM

## 2024-11-22 RX ORDER — ONDANSETRON 2 MG/ML
4 INJECTION INTRAMUSCULAR; INTRAVENOUS ONCE
Status: COMPLETED | OUTPATIENT
Start: 2024-11-22 | End: 2024-11-22

## 2024-11-22 RX ORDER — METOCLOPRAMIDE HYDROCHLORIDE 5 MG/ML
10 INJECTION INTRAMUSCULAR; INTRAVENOUS ONCE
Status: COMPLETED | OUTPATIENT
Start: 2024-11-22 | End: 2024-11-22

## 2024-11-22 RX ORDER — LORATADINE 10 MG/1
10 TABLET ORAL DAILY
Qty: 20 TABLET | Refills: 0 | Status: SHIPPED | OUTPATIENT
Start: 2024-11-22

## 2024-11-22 RX ORDER — LORATADINE 10 MG/1
10 TABLET ORAL ONCE
Status: COMPLETED | OUTPATIENT
Start: 2024-11-22 | End: 2024-11-22

## 2024-11-22 RX ADMIN — ONDANSETRON 4 MG: 2 INJECTION INTRAMUSCULAR; INTRAVENOUS at 06:50

## 2024-11-22 RX ADMIN — METOCLOPRAMIDE 10 MG: 5 INJECTION, SOLUTION INTRAMUSCULAR; INTRAVENOUS at 07:30

## 2024-11-22 RX ADMIN — LORATADINE 10 MG: 10 TABLET ORAL at 10:08

## 2024-11-22 RX ADMIN — SODIUM CHLORIDE 500 ML: 0.9 INJECTION, SOLUTION INTRAVENOUS at 07:30

## 2024-11-22 RX ADMIN — IOHEXOL 100 ML: 350 INJECTION, SOLUTION INTRAVENOUS at 09:18

## 2024-11-22 NOTE — ED ATTENDING ATTESTATION
11/22/2024  I, Moshe Stein MD, saw and evaluated the patient. I have discussed the patient with the resident/non-physician practitioner and agree with the resident's/non-physician practitioner's findings, Plan of Care, and MDM as documented in the resident's/non-physician practitioner's note, except where noted. All available labs and Radiology studies were reviewed.  I was present for key portions of any procedure(s) performed by the resident/non-physician practitioner and I was immediately available to provide assistance.       At this point I agree with the current assessment done in the Emergency Department.  I have conducted an independent evaluation of this patient a history and physical is as follows:    ED Course     Impression: Abdominal pain, nausea, history of gastroparesis  Differential diagnosis: Gastritis gastroenteritis, pancreatitis, doubt cholecystitis doubt diverticulitis, doubt ACS    Plan to check ECG CBC CMP lipase CTAP give trial of antiemetics Reglan reassess anticipate discharge if negative ED workup.    ECG independently interpreted by me normal sinus rhythm normal rate normal axis no mentals no acute ST-T changes.    Chest x-ray independently interpreted by me: no acute cardiac or pulmonary disease    Labs interpreted by me: CBC remarkable for mild leukopenia elevated segs may be reactive.  CMP remarkable for mild hyponatremia but at baseline.  BUN normal limits normal LFTs.  Lipase within normal limits.    CT abdomen pelvis reviewed report: No acute findings in abdomen pelvis cholelithiasis still stable nodules right lung base no further evaluation indicated.    Patient reassessed feeling better requesting discharge to home follow-up PCP as outpatient    Critical Care Time  Procedures

## 2024-11-24 DIAGNOSIS — K31.84 GASTROPARESIS: ICD-10-CM

## 2024-11-26 RX ORDER — METOCLOPRAMIDE 5 MG/1
5 TABLET ORAL
Qty: 90 TABLET | Refills: 0 | Status: SHIPPED | OUTPATIENT
Start: 2024-11-26

## 2024-11-28 PROBLEM — J01.00 ACUTE NON-RECURRENT MAXILLARY SINUSITIS: Status: RESOLVED | Noted: 2020-06-01 | Resolved: 2024-11-28

## 2024-12-05 ENCOUNTER — NURSE TRIAGE (OUTPATIENT)
Age: 64
End: 2024-12-05

## 2024-12-05 NOTE — TELEPHONE ENCOUNTER
"Reason for Disposition   Can't control passage of urine (i.e., urinary incontinence, wetting self) and present > 2 weeks    Answer Assessment - Initial Assessment Questions  1. SYMPTOM: \"What's the main symptom you're concerned about?\" (e.g., frequency, incontinence)      Frequency ,incontinence   2. ONSET: \"When did the    start?\"    Five months ago    3. PAIN: \"Is there any pain?\" If Yes, ask: \"How bad is it?\" (Scale: 1-10; mild, moderate, severe)      Patient denies pain   4. CAUSE: \"What do you think is causing the symptoms?\"      Unknown   5. OTHER SYMPTOMS: \"Do you have any other symptoms?\" (e.g., blood in urine, fever, flank pain, pain with urination)      Patient denies blood in urine, fever, flank pain, pain with urination  Appt scheduled for 12/10.Offered patient an earlier appt patient declined due to transportation reasons.    Protocols used: Urinary Symptoms-Adult-[place]    "

## 2024-12-10 ENCOUNTER — NURSE TRIAGE (OUTPATIENT)
Age: 64
End: 2024-12-10

## 2024-12-10 ENCOUNTER — TELEMEDICINE (OUTPATIENT)
Dept: INTERNAL MEDICINE CLINIC | Facility: CLINIC | Age: 64
End: 2024-12-10
Payer: COMMERCIAL

## 2024-12-10 ENCOUNTER — TELEPHONE (OUTPATIENT)
Age: 64
End: 2024-12-10

## 2024-12-10 ENCOUNTER — TELEPHONE (OUTPATIENT)
Dept: INTERNAL MEDICINE CLINIC | Facility: CLINIC | Age: 64
End: 2024-12-10

## 2024-12-10 DIAGNOSIS — R53.83 OTHER FATIGUE: Primary | ICD-10-CM

## 2024-12-10 PROCEDURE — 99213 OFFICE O/P EST LOW 20 MIN: CPT | Performed by: INTERNAL MEDICINE

## 2024-12-10 PROCEDURE — G2211 COMPLEX E/M VISIT ADD ON: HCPCS | Performed by: INTERNAL MEDICINE

## 2024-12-10 NOTE — ASSESSMENT & PLAN NOTE
"With patient having large amounts of dark material vomit looking like \"Oreo cookies\", discussed the possibility of upper GI bleed, and recommendation for evaluation at the ED with check of hemoglobin.     "

## 2024-12-10 NOTE — TELEPHONE ENCOUNTER
OA Questions for EGD  Date: [  ]  Screened by: [  ]     Referring Provider: [  ]     Pre-Screening: BMI  34.02  Past EGD? If yes - yes  Date: [   ]    Physician/Facility: [   ]    Reason: [   ]     SCHEDULING STAFF: If the patient is over 75 years old, please schedule an office visit.  ·      Does the patient want to see a gastroenterologist prior to their procedure to discuss any GI symptoms? No  ·      Has the patient been hospitalized or had abdominal surgery in the past 6 months? Yes Sept / Oct 2024  ·      Does the patient use supplemental oxygen? no  ·      Does the patient take [Coumadin], [Lovenox], [Plavix], [Eliquis], [Xarelto], or other blood thinning medication? No  ·      Has the patient had a stroke, cardiac event, or stent placed in the past year?      SCHEDULING STAFF: If patient answers NO to the above questions, then schedule the procedure. If patient answers YES to any of the above questions, then schedule an office appointment.  ·       If a repeat EGD is belated and patient declines procedure à notify provider.

## 2024-12-10 NOTE — PATIENT INSTRUCTIONS
"Problem List Items Addressed This Visit          Other    Other fatigue - Primary    With patient having large amounts of dark material vomit looking like \"Oreo cookies\", discussed the possibility of upper GI bleed, and recommendation for evaluation at the ED with check of hemoglobin.            "

## 2024-12-10 NOTE — TELEPHONE ENCOUNTER
Regarding: appointment and GI bleed  ----- Message from Milena BLACK sent at 12/10/2024  4:44 PM EST -----  Pt called to schedule an appointment. Per pt was informed by PCP to schedule appointment in regard to GI bleed.

## 2024-12-10 NOTE — PROGRESS NOTES
"Virtual Regular Visit  Name: Irene Plascencia      : 1960      MRN: [id number]  Encounter Provider: Raheem Cain MD  Encounter Date: 12/10/2024   [facility] department: [facility]      Verification of patient location:  Patient is located at Home in the following state in which I hold an active license [place] :  Assessment & Plan  Other fatigue  With patient having large amounts of dark material vomit looking like \"Oreo cookies\", discussed the possibility of upper GI bleed, and recommendation for evaluation at the ED with check of hemoglobin.           Encounter provider Raheem Cain MD    The patient was identified by name and date of birth. Irene Plascencia was informed that this is a telemedicine visit and that the visit is being conducted through the Epic Embedded platform. She agrees to proceed..  My office door was closed. No one else was in the room.  She acknowledged consent and understanding of privacy and security of the video platform. The patient has agreed to participate and understands they can discontinue the visit at any time.    Patient is aware this is a billable service.     History of Present Illness     I tried calling patient on both numbers on file multiple times, kept getting voicemial for patient, left multiple messages.    Pt reports she slept a lot yesterday.  Today she had some chills.  She reports vomiting dark material for the past 5 days that looks like \"oreo cookies.\"  (She admits to having a few oreo cookies)  No black tarry stool      Review of Systems   Constitutional:  Positive for chills and fatigue. Negative for fever.   Respiratory:  Negative for chest tightness and shortness of breath.    Cardiovascular:  Negative for chest pain.   Gastrointestinal:  Positive for vomiting. Negative for abdominal pain.       Objective   There were no vitals taken for this visit.    Physical Exam  Constitutional:       General: She is not in acute " distress.  Pulmonary:      Effort: Pulmonary effort is normal. No respiratory distress.         Visit Time  Total Visit Duration: 20

## 2024-12-11 NOTE — TELEPHONE ENCOUNTER
Called mobile, no answer, left message requesting call back and that I would attempt call on home number to see if I could reach her, no answer on home phone, left message on home also to call in for nursing to triage.

## 2024-12-12 NOTE — TELEPHONE ENCOUNTER
Made another attempt to reach patient, no answer, left another message requesting she call in to have nursing triage symptoms and schedule visit.

## 2024-12-13 ENCOUNTER — PATIENT OUTREACH (OUTPATIENT)
Dept: CASE MANAGEMENT | Facility: OTHER | Age: 64
End: 2024-12-13

## 2024-12-13 ENCOUNTER — TELEPHONE (OUTPATIENT)
Age: 64
End: 2024-12-13

## 2024-12-13 DIAGNOSIS — Z59.9 FINANCIAL DIFFICULTIES: Primary | ICD-10-CM

## 2024-12-13 DIAGNOSIS — F41.9 ANXIETY: ICD-10-CM

## 2024-12-13 SDOH — ECONOMIC STABILITY - INCOME SECURITY: PROBLEM RELATED TO HOUSING AND ECONOMIC CIRCUMSTANCES, UNSPECIFIED: Z59.9

## 2024-12-13 NOTE — PROGRESS NOTES
ASIM CM received a referral from patient's PCP regarding patient's need for assistance with the cost of medications. Per chart, pt is showing dual Medicare/Medicaid insurance. Attempted to contact pt to discuss. LVM requesting a call in return at her earliest convenience.Will continue attempts to reach.

## 2024-12-13 NOTE — TELEPHONE ENCOUNTER
Pt requested a speak with a  in regards to getting medication as she can't afford it and some meds are not covered. She wants to talk about a program that can help. Please contact pt before end of day today. Thank you for your help.

## 2024-12-16 DIAGNOSIS — F41.9 ANXIETY: ICD-10-CM

## 2024-12-16 DIAGNOSIS — R05.1 ACUTE COUGH: ICD-10-CM

## 2024-12-16 RX ORDER — BENZONATATE 200 MG/1
200 CAPSULE ORAL 3 TIMES DAILY PRN
Qty: 30 CAPSULE | Refills: 5 | Status: SHIPPED | OUTPATIENT
Start: 2024-12-16

## 2024-12-16 RX ORDER — LORAZEPAM 2 MG/1
2 TABLET ORAL EVERY 4 HOURS PRN
Qty: 180 TABLET | Refills: 1 | Status: SHIPPED | OUTPATIENT
Start: 2024-12-16 | End: 2024-12-16 | Stop reason: SDUPTHER

## 2024-12-16 RX ORDER — LORAZEPAM 2 MG/1
2 TABLET ORAL EVERY 6 HOURS PRN
Qty: 180 TABLET | Refills: 1 | Status: SHIPPED | OUTPATIENT
Start: 2024-12-16

## 2024-12-16 NOTE — TELEPHONE ENCOUNTER
[facility] pharmacy called in and stated that [employer] will only cover lorazepam 5 times a day, it was ordered for 6 times a day.  [employer] phone number is [phone].  Please advise

## 2024-12-16 NOTE — TELEPHONE ENCOUNTER
PT stated she would need the Tessalon reordered. Originally when it was ordered she could not afford it and the pharmacy re-shelved them.However, she still cannot afford them , and she is requesting to speak to the , and would like a phone call to advise how to get a hold of the .    Please advise    ThankYou

## 2024-12-17 NOTE — TELEPHONE ENCOUNTER
LM for pt that scripts were taken care of and picked [facility] per [facility]. Also reviewed that social work had reached out on 12/13 and to continue to  look for this call.

## 2024-12-18 ENCOUNTER — PATIENT OUTREACH (OUTPATIENT)
Dept: CASE MANAGEMENT | Facility: OTHER | Age: 64
End: 2024-12-18

## 2024-12-18 NOTE — LETTER
[address] [facility] [address]  [phone]    Re: Unable to reach/Intro   12/18/2024       Dear Irene,    I am a [facility]  and wanted to be certain you had information to contact me should you desire assistance with or have questions about non-medical aspects of your care such as [but not limited to] medical insurance, housing, transportation, material needs, or emergency needs.  If I do not have an answer I will assist you in finding the appropriate agency or individual who can help.      Please feel free to contact me at [phone]. Thank You.    Sincerely,         Rosangela Roa MSW,LSW

## 2024-12-18 NOTE — PROGRESS NOTES
ASIM HOFFMAN received a referral from patient's PCP regarding patient's difficulty affording medication. ASIM HOFFMAN has made multiple attempts to contact patient to assist, however, attempts to reach patient have been unsuccessful at this time. UTR letter sent. Referral will be closed, however, ASIM HOFFMAN will be available if call back is received.

## 2024-12-23 ENCOUNTER — APPOINTMENT (EMERGENCY)
Dept: RADIOLOGY | Facility: HOSPITAL | Age: 64
DRG: 177 | End: 2024-12-23
Payer: COMMERCIAL

## 2024-12-23 ENCOUNTER — HOSPITAL ENCOUNTER (INPATIENT)
Facility: HOSPITAL | Age: 64
LOS: 3 days | Discharge: HOME | DRG: 177 | End: 2024-12-26
Attending: EMERGENCY MEDICINE | Admitting: INTERNAL MEDICINE
Payer: COMMERCIAL

## 2024-12-23 DIAGNOSIS — J18.9 MULTIFOCAL PNEUMONIA: ICD-10-CM

## 2024-12-23 DIAGNOSIS — R93.89 ABNORMAL CT SCAN: ICD-10-CM

## 2024-12-23 DIAGNOSIS — R79.89 ELEVATED LACTIC ACID LEVEL: ICD-10-CM

## 2024-12-23 DIAGNOSIS — K20.90 ESOPHAGITIS: ICD-10-CM

## 2024-12-23 DIAGNOSIS — K21.00 GASTROESOPHAGEAL REFLUX DISEASE WITH ESOPHAGITIS WITHOUT HEMORRHAGE: ICD-10-CM

## 2024-12-23 DIAGNOSIS — J69.0 ASPIRATION PNEUMONIA (HCC): ICD-10-CM

## 2024-12-23 DIAGNOSIS — K31.84 GASTROPARESIS: ICD-10-CM

## 2024-12-23 DIAGNOSIS — E87.1 HYPONATREMIA: ICD-10-CM

## 2024-12-23 DIAGNOSIS — J18.9 PNEUMONIA: ICD-10-CM

## 2024-12-23 DIAGNOSIS — Z13.9 ENCOUNTER FOR SCREENING INVOLVING SOCIAL DETERMINANTS OF HEALTH (SDOH): ICD-10-CM

## 2024-12-23 DIAGNOSIS — R09.02 HYPOXIA: Primary | ICD-10-CM

## 2024-12-23 DIAGNOSIS — R11.10 VOMITING: ICD-10-CM

## 2024-12-23 LAB
ALBUMIN SERPL BCG-MCNC: 3.7 G/DL (ref 3.5–5)
ALP SERPL-CCNC: 66 U/L (ref 34–104)
ALT SERPL W P-5'-P-CCNC: 12 U/L (ref 7–52)
ANION GAP SERPL CALCULATED.3IONS-SCNC: 10 MMOL/L (ref 4–13)
ANION GAP SERPL CALCULATED.3IONS-SCNC: 11 MMOL/L (ref 4–13)
ANISOCYTOSIS BLD QL SMEAR: PRESENT
APTT PPP: 28 SECONDS (ref 23–34)
AST SERPL W P-5'-P-CCNC: 19 U/L (ref 13–39)
ATRIAL RATE: 104 BPM
BACTERIA UR QL AUTO: ABNORMAL /HPF
BASOPHILS # BLD MANUAL: 0 THOUSAND/UL (ref 0–0.1)
BASOPHILS NFR MAR MANUAL: 0 % (ref 0–1)
BILIRUB SERPL-MCNC: 0.24 MG/DL (ref 0.2–1)
BILIRUB UR QL STRIP: NEGATIVE
BUN SERPL-MCNC: 11 MG/DL (ref 5–25)
BUN SERPL-MCNC: 9 MG/DL (ref 5–25)
CALCIUM SERPL-MCNC: 8.1 MG/DL (ref 8.4–10.2)
CALCIUM SERPL-MCNC: 8.3 MG/DL (ref 8.4–10.2)
CARDIAC TROPONIN I PNL SERPL HS: <2 NG/L (ref ?–50)
CHLORIDE SERPL-SCNC: 100 MMOL/L (ref 96–108)
CHLORIDE SERPL-SCNC: 93 MMOL/L (ref 96–108)
CLARITY UR: ABNORMAL
CO2 SERPL-SCNC: 21 MMOL/L (ref 21–32)
CO2 SERPL-SCNC: 23 MMOL/L (ref 21–32)
COLOR UR: YELLOW
CREAT SERPL-MCNC: 0.71 MG/DL (ref 0.6–1.3)
CREAT SERPL-MCNC: 0.71 MG/DL (ref 0.6–1.3)
EOSINOPHIL # BLD MANUAL: 0 THOUSAND/UL (ref 0–0.4)
EOSINOPHIL NFR BLD MANUAL: 0 % (ref 0–6)
ERYTHROCYTE [DISTWIDTH] IN BLOOD BY AUTOMATED COUNT: 13.7 % (ref 11.6–15.1)
FLUAV AG UPPER RESP QL IA.RAPID: NEGATIVE
FLUBV AG UPPER RESP QL IA.RAPID: NEGATIVE
GFR SERPL CREATININE-BSD FRML MDRD: 90 ML/MIN/1.73SQ M
GFR SERPL CREATININE-BSD FRML MDRD: 90 ML/MIN/1.73SQ M
GLUCOSE SERPL-MCNC: 137 MG/DL (ref 65–140)
GLUCOSE SERPL-MCNC: 139 MG/DL (ref 65–140)
GLUCOSE UR STRIP-MCNC: NEGATIVE MG/DL
HCT VFR BLD AUTO: 33.2 % (ref 34.8–46.1)
HCT VFR BLD AUTO: 35.5 % (ref 34.8–46.1)
HGB BLD-MCNC: 10.7 G/DL (ref 11.5–15.4)
HGB BLD-MCNC: 11.2 G/DL (ref 11.5–15.4)
HGB UR QL STRIP.AUTO: ABNORMAL
INR PPP: 0.94 (ref 0.85–1.19)
KETONES UR STRIP-MCNC: NEGATIVE MG/DL
LACTATE SERPL-SCNC: 2.7 MMOL/L (ref 0.5–2)
LACTATE SERPL-SCNC: 2.8 MMOL/L (ref 0.5–2)
LACTATE SERPL-SCNC: 2.9 MMOL/L (ref 0.5–2)
LACTATE SERPL-SCNC: 3.3 MMOL/L (ref 0.5–2)
LEUKOCYTE ESTERASE UR QL STRIP: NEGATIVE
LYMPHOCYTES # BLD AUTO: 0.51 THOUSAND/UL (ref 0.6–4.47)
LYMPHOCYTES # BLD AUTO: 2 % (ref 14–44)
MACROCYTES BLD QL AUTO: PRESENT
MCH RBC QN AUTO: 27 PG (ref 26.8–34.3)
MCHC RBC AUTO-ENTMCNC: 32.2 G/DL (ref 31.4–37.4)
MCV RBC AUTO: 84 FL (ref 82–98)
MICROCYTES BLD QL AUTO: PRESENT
MONOCYTES # BLD AUTO: 0.4 THOUSAND/UL (ref 0–1.22)
MONOCYTES NFR BLD: 4 % (ref 4–12)
NEUTROPHILS # BLD MANUAL: 9.2 THOUSAND/UL (ref 1.85–7.62)
NEUTS SEG NFR BLD AUTO: 91 % (ref 43–75)
NITRITE UR QL STRIP: NEGATIVE
NON-SQ EPI CELLS URNS QL MICRO: ABNORMAL /HPF
OVALOCYTES BLD QL SMEAR: PRESENT
P AXIS: 77 DEGREES
PH UR STRIP.AUTO: 5.5 [PH]
PLATELET # BLD AUTO: 215 THOUSANDS/UL (ref 149–390)
PLATELET BLD QL SMEAR: ADEQUATE
PMV BLD AUTO: 8.5 FL (ref 8.9–12.7)
POTASSIUM SERPL-SCNC: 3.6 MMOL/L (ref 3.5–5.3)
POTASSIUM SERPL-SCNC: 4.1 MMOL/L (ref 3.5–5.3)
PR INTERVAL: 122 MS
PROCALCITONIN SERPL-MCNC: <0.05 NG/ML
PROT SERPL-MCNC: 5.8 G/DL (ref 6.4–8.4)
PROT UR STRIP-MCNC: ABNORMAL MG/DL
PROTHROMBIN TIME: 12.9 SECONDS (ref 12.3–15)
QRS AXIS: 77 DEGREES
QRSD INTERVAL: 80 MS
QT INTERVAL: 306 MS
QTC INTERVAL: 402 MS
RBC # BLD AUTO: 3.96 MILLION/UL (ref 3.81–5.12)
RBC #/AREA URNS AUTO: ABNORMAL /HPF
RBC MORPH BLD: PRESENT
RENAL EPI CELLS #/AREA URNS HPF: ABNORMAL /[HPF]
SARS-COV+SARS-COV-2 AG RESP QL IA.RAPID: NEGATIVE
SODIUM SERPL-SCNC: 127 MMOL/L (ref 135–147)
SODIUM SERPL-SCNC: 131 MMOL/L (ref 135–147)
SP GR UR STRIP.AUTO: 1.02 (ref 1–1.03)
T WAVE AXIS: 41 DEGREES
UROBILINOGEN UR STRIP-ACNC: <2 MG/DL
VARIANT LYMPHS # BLD AUTO: 3 %
VENTRICULAR RATE: 104 BPM
WBC # BLD AUTO: 10.11 THOUSAND/UL (ref 4.31–10.16)
WBC #/AREA URNS AUTO: ABNORMAL /HPF

## 2024-12-23 PROCEDURE — 85027 COMPLETE CBC AUTOMATED: CPT

## 2024-12-23 PROCEDURE — 85018 HEMOGLOBIN: CPT | Performed by: INTERNAL MEDICINE

## 2024-12-23 PROCEDURE — 96375 TX/PRO/DX INJ NEW DRUG ADDON: CPT

## 2024-12-23 PROCEDURE — NC001 PR NO CHARGE: Performed by: INTERNAL MEDICINE

## 2024-12-23 PROCEDURE — 84484 ASSAY OF TROPONIN QUANT: CPT

## 2024-12-23 PROCEDURE — 99232 SBSQ HOSP IP/OBS MODERATE 35: CPT | Performed by: INTERNAL MEDICINE

## 2024-12-23 PROCEDURE — 92610 EVALUATE SWALLOWING FUNCTION: CPT

## 2024-12-23 PROCEDURE — 99223 1ST HOSP IP/OBS HIGH 75: CPT | Performed by: INTERNAL MEDICINE

## 2024-12-23 PROCEDURE — 99291 CRITICAL CARE FIRST HOUR: CPT | Performed by: EMERGENCY MEDICINE

## 2024-12-23 PROCEDURE — 85730 THROMBOPLASTIN TIME PARTIAL: CPT

## 2024-12-23 PROCEDURE — 94664 DEMO&/EVAL PT USE INHALER: CPT

## 2024-12-23 PROCEDURE — 87154 CUL TYP ID BLD PTHGN 6+ TRGT: CPT

## 2024-12-23 PROCEDURE — 83605 ASSAY OF LACTIC ACID: CPT | Performed by: INTERNAL MEDICINE

## 2024-12-23 PROCEDURE — 80048 BASIC METABOLIC PNL TOTAL CA: CPT | Performed by: INTERNAL MEDICINE

## 2024-12-23 PROCEDURE — 87081 CULTURE SCREEN ONLY: CPT | Performed by: INTERNAL MEDICINE

## 2024-12-23 PROCEDURE — 74177 CT ABD & PELVIS W/CONTRAST: CPT

## 2024-12-23 PROCEDURE — 87804 INFLUENZA ASSAY W/OPTIC: CPT

## 2024-12-23 PROCEDURE — 85014 HEMATOCRIT: CPT | Performed by: INTERNAL MEDICINE

## 2024-12-23 PROCEDURE — 96365 THER/PROPH/DIAG IV INF INIT: CPT

## 2024-12-23 PROCEDURE — 87040 BLOOD CULTURE FOR BACTERIA: CPT

## 2024-12-23 PROCEDURE — 94760 N-INVAS EAR/PLS OXIMETRY 1: CPT

## 2024-12-23 PROCEDURE — 85610 PROTHROMBIN TIME: CPT

## 2024-12-23 PROCEDURE — 87811 SARS-COV-2 COVID19 W/OPTIC: CPT

## 2024-12-23 PROCEDURE — 81001 URINALYSIS AUTO W/SCOPE: CPT

## 2024-12-23 PROCEDURE — 85007 BL SMEAR W/DIFF WBC COUNT: CPT

## 2024-12-23 PROCEDURE — 84145 PROCALCITONIN (PCT): CPT

## 2024-12-23 PROCEDURE — 94640 AIRWAY INHALATION TREATMENT: CPT

## 2024-12-23 PROCEDURE — 36415 COLL VENOUS BLD VENIPUNCTURE: CPT

## 2024-12-23 PROCEDURE — 99222 1ST HOSP IP/OBS MODERATE 55: CPT | Performed by: INTERNAL MEDICINE

## 2024-12-23 PROCEDURE — 71260 CT THORAX DX C+: CPT

## 2024-12-23 PROCEDURE — 87186 SC STD MICRODIL/AGAR DIL: CPT

## 2024-12-23 PROCEDURE — 99285 EMERGENCY DEPT VISIT HI MDM: CPT

## 2024-12-23 PROCEDURE — 83605 ASSAY OF LACTIC ACID: CPT

## 2024-12-23 PROCEDURE — 93005 ELECTROCARDIOGRAM TRACING: CPT

## 2024-12-23 PROCEDURE — 80053 COMPREHEN METABOLIC PANEL: CPT

## 2024-12-23 PROCEDURE — 93010 ELECTROCARDIOGRAM REPORT: CPT | Performed by: INTERNAL MEDICINE

## 2024-12-23 PROCEDURE — 87077 CULTURE AEROBIC IDENTIFY: CPT

## 2024-12-23 RX ORDER — BUPROPION HYDROCHLORIDE 75 MG/1
75 TABLET ORAL
COMMUNITY
Start: 2024-12-22

## 2024-12-23 RX ORDER — ENOXAPARIN SODIUM 100 MG/ML
40 INJECTION SUBCUTANEOUS DAILY
Status: DISCONTINUED | OUTPATIENT
Start: 2024-12-23 | End: 2024-12-26 | Stop reason: HOSPADM

## 2024-12-23 RX ORDER — ALBUTEROL SULFATE 0.83 MG/ML
2.5 SOLUTION RESPIRATORY (INHALATION) EVERY 4 HOURS PRN
Status: DISCONTINUED | OUTPATIENT
Start: 2024-12-23 | End: 2024-12-26 | Stop reason: HOSPADM

## 2024-12-23 RX ORDER — ONDANSETRON 2 MG/ML
4 INJECTION INTRAMUSCULAR; INTRAVENOUS EVERY 6 HOURS PRN
Status: DISCONTINUED | OUTPATIENT
Start: 2024-12-23 | End: 2024-12-26 | Stop reason: HOSPADM

## 2024-12-23 RX ORDER — IPRATROPIUM BROMIDE AND ALBUTEROL SULFATE .5; 3 MG/3ML; MG/3ML
1 SOLUTION RESPIRATORY (INHALATION) ONCE
Status: COMPLETED | OUTPATIENT
Start: 2024-12-23 | End: 2024-12-23

## 2024-12-23 RX ORDER — SODIUM CHLORIDE 9 MG/ML
75 INJECTION, SOLUTION INTRAVENOUS CONTINUOUS
Status: DISCONTINUED | OUTPATIENT
Start: 2024-12-23 | End: 2024-12-23

## 2024-12-23 RX ORDER — PREDNISONE 20 MG/1
40 TABLET ORAL DAILY
Status: DISCONTINUED | OUTPATIENT
Start: 2024-12-23 | End: 2024-12-26 | Stop reason: HOSPADM

## 2024-12-23 RX ORDER — BENZONATATE 100 MG/1
200 CAPSULE ORAL 3 TIMES DAILY PRN
Status: DISCONTINUED | OUTPATIENT
Start: 2024-12-23 | End: 2024-12-26 | Stop reason: HOSPADM

## 2024-12-23 RX ORDER — LEVALBUTEROL INHALATION SOLUTION 1.25 MG/3ML
1.25 SOLUTION RESPIRATORY (INHALATION)
Status: DISCONTINUED | OUTPATIENT
Start: 2024-12-23 | End: 2024-12-26 | Stop reason: HOSPADM

## 2024-12-23 RX ORDER — METOCLOPRAMIDE HYDROCHLORIDE 5 MG/ML
5 INJECTION INTRAMUSCULAR; INTRAVENOUS 3 TIMES DAILY
Status: DISCONTINUED | OUTPATIENT
Start: 2024-12-23 | End: 2024-12-24

## 2024-12-23 RX ORDER — QUETIAPINE FUMARATE 100 MG/1
400 TABLET, FILM COATED ORAL
Status: DISCONTINUED | OUTPATIENT
Start: 2024-12-23 | End: 2024-12-26 | Stop reason: HOSPADM

## 2024-12-23 RX ORDER — METOCLOPRAMIDE HYDROCHLORIDE 5 MG/ML
10 INJECTION INTRAMUSCULAR; INTRAVENOUS ONCE
Status: COMPLETED | OUTPATIENT
Start: 2024-12-23 | End: 2024-12-23

## 2024-12-23 RX ORDER — ONDANSETRON 2 MG/ML
1 INJECTION INTRAMUSCULAR; INTRAVENOUS ONCE
Status: COMPLETED | OUTPATIENT
Start: 2024-12-23 | End: 2024-12-23

## 2024-12-23 RX ORDER — PANTOPRAZOLE SODIUM 40 MG/10ML
40 INJECTION, POWDER, LYOPHILIZED, FOR SOLUTION INTRAVENOUS EVERY 12 HOURS SCHEDULED
Status: COMPLETED | OUTPATIENT
Start: 2024-12-23 | End: 2024-12-24

## 2024-12-23 RX ORDER — ALBUTEROL SULFATE 90 UG/1
2 INHALANT RESPIRATORY (INHALATION) EVERY 4 HOURS PRN
Status: DISCONTINUED | OUTPATIENT
Start: 2024-12-23 | End: 2024-12-23

## 2024-12-23 RX ORDER — ALBUTEROL SULFATE 2.5 MG/3ML
2 SOLUTION RESPIRATORY (INHALATION) ONCE
Status: COMPLETED | OUTPATIENT
Start: 2024-12-23 | End: 2024-12-23

## 2024-12-23 RX ORDER — LEVALBUTEROL INHALATION SOLUTION 0.63 MG/3ML
0.63 SOLUTION RESPIRATORY (INHALATION) EVERY 6 HOURS PRN
Status: DISCONTINUED | OUTPATIENT
Start: 2024-12-23 | End: 2024-12-23

## 2024-12-23 RX ORDER — METHYLPREDNISOLONE SOD SUCC 125 MG
1 VIAL (EA) INJECTION ONCE
Status: COMPLETED | OUTPATIENT
Start: 2024-12-23 | End: 2024-12-23

## 2024-12-23 RX ADMIN — LEVALBUTEROL HYDROCHLORIDE 1.25 MG: 1.25 SOLUTION RESPIRATORY (INHALATION) at 18:48

## 2024-12-23 RX ADMIN — METOCLOPRAMIDE 10 MG: 5 INJECTION, SOLUTION INTRAMUSCULAR; INTRAVENOUS at 02:31

## 2024-12-23 RX ADMIN — IOHEXOL 100 ML: 350 INJECTION, SOLUTION INTRAVENOUS at 03:03

## 2024-12-23 RX ADMIN — METOCLOPRAMIDE 5 MG: 5 INJECTION, SOLUTION INTRAMUSCULAR; INTRAVENOUS at 16:51

## 2024-12-23 RX ADMIN — QUETIAPINE FUMARATE 400 MG: 100 TABLET ORAL at 22:08

## 2024-12-23 RX ADMIN — PIPERACILLIN SODIUM AND TAZOBACTAM SODIUM 4.5 G: 36; 4.5 INJECTION, POWDER, LYOPHILIZED, FOR SOLUTION INTRAVENOUS at 08:57

## 2024-12-23 RX ADMIN — PIPERACILLIN SODIUM AND TAZOBACTAM SODIUM 4.5 G: 36; 4.5 INJECTION, POWDER, LYOPHILIZED, FOR SOLUTION INTRAVENOUS at 16:51

## 2024-12-23 RX ADMIN — ENOXAPARIN SODIUM 40 MG: 40 INJECTION SUBCUTANEOUS at 09:16

## 2024-12-23 RX ADMIN — PIPERACILLIN SODIUM AND TAZOBACTAM SODIUM 4.5 G: 36; 4.5 INJECTION, POWDER, LYOPHILIZED, FOR SOLUTION INTRAVENOUS at 23:55

## 2024-12-23 RX ADMIN — VANCOMYCIN HYDROCHLORIDE 2000 MG: 1 INJECTION, POWDER, LYOPHILIZED, FOR SOLUTION INTRAVENOUS at 05:04

## 2024-12-23 RX ADMIN — METOCLOPRAMIDE 5 MG: 5 INJECTION, SOLUTION INTRAMUSCULAR; INTRAVENOUS at 21:43

## 2024-12-23 RX ADMIN — PANTOPRAZOLE SODIUM 40 MG: 40 INJECTION, POWDER, FOR SOLUTION INTRAVENOUS at 09:16

## 2024-12-23 RX ADMIN — LEVALBUTEROL HYDROCHLORIDE 1.25 MG: 1.25 SOLUTION RESPIRATORY (INHALATION) at 14:10

## 2024-12-23 RX ADMIN — METOCLOPRAMIDE 5 MG: 5 INJECTION, SOLUTION INTRAMUSCULAR; INTRAVENOUS at 12:30

## 2024-12-23 RX ADMIN — PANTOPRAZOLE SODIUM 40 MG: 40 INJECTION, POWDER, FOR SOLUTION INTRAVENOUS at 21:43

## 2024-12-23 RX ADMIN — PIPERACILLIN SODIUM AND TAZOBACTAM SODIUM 4.5 G: 36; 4.5 INJECTION, POWDER, LYOPHILIZED, FOR SOLUTION INTRAVENOUS at 03:47

## 2024-12-23 RX ADMIN — PREDNISONE 40 MG: 20 TABLET ORAL at 14:08

## 2024-12-23 RX ADMIN — ALBUTEROL SULFATE 2.5 MG: 2.5 SOLUTION RESPIRATORY (INHALATION) at 09:45

## 2024-12-23 RX ADMIN — SODIUM CHLORIDE 1000 ML: 0.9 INJECTION, SOLUTION INTRAVENOUS at 05:36

## 2024-12-23 NOTE — ASSESSMENT & PLAN NOTE
>>ASSESSMENT AND PLAN FOR EROSIVE ESOPHAGITIS WRITTEN ON 12/23/2024  6:19 AM BY JESSIKA MENDEZ MD    Chronic, associated with barrets esophagus  Patient scheduled for outpatient EGD  Will get GI input as aspiration seems to be the primary reason for respiratory failure at this time   Consult placed to GI  Will also consult pulmonology as patient has high requirement of 02 and will need optimization  Continue with protonix BID, switch to IV BID due to dysphagia  SLP eval pending

## 2024-12-23 NOTE — RESPIRATORY THERAPY NOTE
RT Protocol Note  Irene Plascencia 64 y.o. female MRN: [id number]  Unit/Bed#: -01 Encounter: [id number]    Assessment    Principal Problem:    Acute hypoxic respiratory failure (HCC)  Active Problems:    Multifocal pneumonia    Chronic hyponatremia    Schizoaffective disorder (HCC)    Primary hypertension    Lactic acidosis    Erosive esophagitis    Hyperlipidemia      Home Pulmonary Medications:  none       Past Medical History:   Diagnosis Date    Anemia     Anxiety     Arthritis     Back pain at L4-L5 level     Schreiber esophagus     Bulimia     Colon polyp     Disease of thyroid gland     hypothyroid    GERD (gastroesophageal reflux disease)     Hearing loss in left ear     History of transfusion     Hyperlipidemia     Hypertension     Hypothyroidism     Leukopenia     NMS (neuroleptic malignant syndrome) 01/03/2020    Pneumonia     RA (rheumatoid arthritis) (HCC)     in feet    Sciatica     Seizure (HCC)     due to medication mix up 8/2018 only one historically    Skin spots, red     lower right arm - poss from cat- no s/s infection    Synovial cyst of lumbar spine     Vertigo     Wears dentures     full set    Weight gain      Social History     Socioeconomic History    Marital status: Single     Spouse name: None    Number of children: None    Years of education: None    Highest education level: None   Occupational History    None   Tobacco Use    Smoking status: Never     Passive exposure: Past    Smokeless tobacco: Never   Vaping Use    Vaping status: Never Used   Substance and Sexual Activity    Alcohol use: Not Currently    Drug use: Not Currently    Sexual activity: Not Currently   Other Topics Concern    None   Social History Narrative    Family disruption death of family member parent, per allscripts     Social Drivers of Health     Financial Resource Strain: Medium Risk (1/4/2023)    Overall Financial Resource Strain (CARDIA)     Difficulty of Paying Living Expenses: Somewhat hard   Food  "Insecurity: Food Insecurity Present (2024)    Nursing - Inadequate Food Risk Classification     Worried About Running Out of Food in the Last Year: Never true     Ran Out of Food in the Last Year: Never true     Ran Out of Food in the Last Year: 2   Transportation Needs: Unmet Transportation Needs (2024)    Nursing - Transportation Risk Classification     Lack of Transportation: Not on file     Lack of Transportation: 1   Physical Activity: Not on file   Stress: Stress Concern Present (2021)    [employer] - Occupational Stress Questionnaire     Feeling of Stress : To some extent   Social Connections: Not on file   Intimate Partner Violence: Unknown (2024)    Nursing IPS     Feels Physically and Emotionally Safe: Not on file     Physically Hurt by Someone: Not on file     Humiliated or Emotionally Abused by Someone: Not on file     Physically Hurt by Someone: 2     Hurt or Threatened by Someone: 2   Housing Stability: At Risk (2024)    Nursing: Inadequate Housing Risk Classification     Has Housing: Not on file     Worried About Losing Housing: Not on file     Unable to Get Utilities: Not on file     Unable to Pay for Housing in the Last Year: 1     Has Housin       Subjective         Objective    Physical Exam:   Assessment Type: Assess only  General Appearance: Awake  Respiratory Pattern: Spontaneous  Chest Assessment: Chest expansion symmetrical  Bilateral Breath Sounds: Rales  O2 Device: NC    Vitals:  Blood pressure 120/79, pulse (!) 109, temperature 99 °F (37.2 °C), temperature source Oral, resp. rate (!) 23, height 5' 2\" (1.575 m), weight 87.3 kg (192 lb 7.4 oz), SpO2 96%, not currently breastfeeding.          Imaging and other studies: Results Review Statement: I reviewed radiology reports from this admission including: CT chest.    O2 Device: NC     Plan    Respiratory Plan: Mild Distress pathway        Resp Comments: Pt admitted for acute " hypoxic resp failure. Pt assessed per RCP order at this time. Pt presented to the ED with fever, cough, and new O2 requirement with difficulty breathing. Pt has increased WOB with rales. Pt does not have a pulm hx and does not take any pulm meds at home. Pt does not have a smoking hx. CT of chest shows faint patchy groundglass consolidations in the RUL and left grader than RLL. At this time pt is on 4L NC, SpO2 97%. Will order pt on PRN Albuterol MDI for SOB and increased WOB. Will cont to monitor pt per RCP.

## 2024-12-23 NOTE — ASSESSMENT & PLAN NOTE
Pneumonia noted on CT vs aspiration pneumonitis  Patient is afebrile, w/o leukocytosis, normal procalcitonin  RSV/COVID/influenza is negative  Leaning towards pneumonitis however patient is toxic appearing  Will continue with zosyn for now, de-escalate pending cultures  Check mrsa swab to guide continuation of Vanco   Send sputum culture, gram stain, urine legionella/strep  Follow up blood cultures sent  Continue albuterol prn sob  Continue with antitussives as needed  Tylenol prn fever  NPO until SLP eval

## 2024-12-23 NOTE — ASSESSMENT & PLAN NOTE
"Presented to ER with c/o productive cough (duration unclear), new onset SOB, generalized weakness and malaise. Additionally with R sided pleuritic CP.   Ddx: Pneumonitis versus pneumonia  New O2 requirement of up to 4 L  Titrate as needed to keep SpO2 greater 92%  CT CAP w contrast showed \"Faint patchy consolidations throughout the lungs, as described, which may be secondary to aspiration or multifocal pneumonia. Recommend 3-month follow-up noncontrast CT of the chest to assess stability/resolution\"  Started on IV Zosyn, initial procal negative.   Repeat chest x-ray tomorrow, consider CT chest if needed  "

## 2024-12-23 NOTE — RESPIRATORY THERAPY NOTE
RT Protocol Note  Irene Plascencia 64 y.o. female MRN: [id number]  Unit/Bed#: -01 Encounter: [id number]    Assessment    Principal Problem:    Acute hypoxic respiratory failure (HCC)  Active Problems:    Multifocal pneumonia    Chronic hyponatremia    Schizoaffective disorder (HCC)    Primary hypertension    Lactic acidosis    Erosive esophagitis    Hyperlipidemia    Gastroparesis      Home Pulmonary Medications:  none       Past Medical History:   Diagnosis Date    Anemia     Anxiety     Arthritis     Back pain at L4-L5 level     Schreiber esophagus     Bulimia     Colon polyp     Disease of thyroid gland     hypothyroid    GERD (gastroesophageal reflux disease)     Hearing loss in left ear     History of transfusion     Hyperlipidemia     Hypertension     Hypothyroidism     Leukopenia     NMS (neuroleptic malignant syndrome) 01/03/2020    Pneumonia     RA (rheumatoid arthritis) (HCC)     in feet    Sciatica     Seizure (HCC)     due to medication mix up 8/2018 only one historically    Skin spots, red     lower right arm - poss from cat- no s/s infection    Synovial cyst of lumbar spine     Vertigo     Wears dentures     full set    Weight gain      Social History     Socioeconomic History    Marital status: Single     Spouse name: None    Number of children: None    Years of education: None    Highest education level: None   Occupational History    None   Tobacco Use    Smoking status: Never     Passive exposure: Past    Smokeless tobacco: Never   Vaping Use    Vaping status: Never Used   Substance and Sexual Activity    Alcohol use: Not Currently    Drug use: Not Currently    Sexual activity: Not Currently   Other Topics Concern    None   Social History Narrative    Family disruption death of family member parent, per allscripts     Social Drivers of Health     Financial Resource Strain: Medium Risk (1/4/2023)    Overall Financial Resource Strain (CARDIA)     Difficulty of Paying Living Expenses: Somewhat  "hard   Food Insecurity: Food Insecurity Present (2024)    Nursing - Inadequate Food Risk Classification     Worried About Running Out of Food in the Last Year: Never true     Ran Out of Food in the Last Year: Never true     Ran Out of Food in the Last Year: 2   Transportation Needs: Unmet Transportation Needs (2024)    Nursing - Transportation Risk Classification     Lack of Transportation: Not on file     Lack of Transportation: 1   Physical Activity: Not on file   Stress: Stress Concern Present (2021)    [employer] - Occupational Stress Questionnaire     Feeling of Stress : To some extent   Social Connections: Not on file   Intimate Partner Violence: Unknown (2024)    Nursing IPS     Feels Physically and Emotionally Safe: Not on file     Physically Hurt by Someone: Not on file     Humiliated or Emotionally Abused by Someone: Not on file     Physically Hurt by Someone: 2     Hurt or Threatened by Someone: 2   Housing Stability: At Risk (2024)    Nursing: Inadequate Housing Risk Classification     Has Housing: Not on file     Worried About Losing Housing: Not on file     Unable to Get Utilities: Not on file     Unable to Pay for Housing in the Last Year: 1     Has Housin       Subjective         Objective    Physical Exam:   Assessment Type: (P) Assess only  General Appearance: (P) Awake  Respiratory Pattern: (P) Normal  Chest Assessment: (P) Chest expansion symmetrical  Bilateral Breath Sounds: (P) Coarse, Expiratory wheezes  O2 Device: NC    Vitals:  Blood pressure 120/79, pulse (!) 106, temperature 99 °F (37.2 °C), temperature source Oral, resp. rate (!) 23, height 5' 2\" (1.575 m), weight 87.3 kg (192 lb 7.4 oz), SpO2 97%, not currently breastfeeding.          Imaging and other studies: Results Review Statement: No pertinent imaging studies reviewed.    O2 Device: NC     Plan    Respiratory Plan: (P) Mild Distress pathway        Resp Comments: Called " to pt room due to wheezing, prn udn given at this time. Pt has no pulmonary HX and takes no bronchodilators at home. At this time pt has bilateral wheezing pt will be scheduled TID xopnex and will cont to be followed per resp protocol.

## 2024-12-23 NOTE — Clinical Note
Case was discussed with   and the patient's admission status was agreed to be Admission Status: inpatient status to the service of    .

## 2024-12-23 NOTE — CASE MANAGEMENT
Case Management Assessment & Discharge Planning Note    Patient name Irene Plascencia  Location /-01 MRN [id number]  : 1960 Date 2024       Current Admission Date: 2024  Current Admission Diagnosis:Acute hypoxic respiratory failure (HCC)   Patient Active Problem List    Diagnosis Date Noted Date Diagnosed    Leukopenia 2024     Heartburn 2024     Gastroparesis 2024     Elevated vitamin B12 level 2024     Iron deficiency 2024     Acquired absence of other toe(s), unspecified side ([facility]) 2024     Osteomyelitis, unspecified site, unspecified type ([facility]) 2024     Vitamin B12 deficiency (dietary) anemia 2023     Folate deficiency 2023     Neutropenia ([facility]) 2023     Post concussion syndrome 2023     Sepsis without acute organ dysfunction ([facility]) 2023     Acute hypoxic respiratory failure ([facility]) 2023     Headache 2023     Nutritional anemia 2023     Stress incontinence 2023     Ulcer of toe, chronic, left, with unspecified severity ([facility]) 2023     Acute encephalopathy 2022     Thrombocytosis 2022     Abdominal pain 2022     Schreiber's esophagus 2022     Microcytic anemia 2022     Self neglect 2022     Pruritus 2022     Upper GI bleed 01/10/2022     Nausea and vomiting 10/22/2021     S/P tooth extraction 10/22/2021     SIRS (systemic inflammatory response syndrome) ([facility]) 2021     Hyperlipidemia 2021     Word finding difficulty 2021     Hiatal hernia with GERD without esophagitis 2021     Polyp of colon 2021     Melena 2020     Cellulitis of left upper extremity 2020     Erosive esophagitis 2020     Rash 2020     Iron deficiency anemia 2020     Multiple excoriations 2020     Fall 2020     Esophagitis 2020     Problem related to living arrangement 2020     Hypokalemia       NMS (neuroleptic malignant syndrome) 01/03/2020     Lactic acidosis 01/03/2020     Preop exam for internal medicine 11/18/2019     Severe iron deficiency anemia  11/14/2019     Chronic bilateral low back pain without sciatica 11/01/2019     Right hip pain 07/15/2019     Primary hypertension 06/12/2019     Dermal hypersensitivity reaction 11/08/2018     Psychiatric disorder 08/21/2018     Schizoaffective disorder (HCC) 08/16/2018     Serotonin syndrome 08/13/2018     Other fatigue 06/19/2018     Spinal stenosis of lumbar region with neurogenic claudication 06/15/2018     Lumbar spondylosis 06/15/2018     T12 compression fracture (HCC) 06/15/2018     Synovial cyst of lumbar facet joint 06/15/2018     Localized osteoporosis with current pathological fracture with routine healing 05/25/2018     Lumbar radiculopathy 03/19/2018     DDD (degenerative disc disease), lumbar 03/19/2018     Spondylolisthesis at L4-L5 level 03/19/2018     Anxiety 10/31/2016     Acquired hypothyroidism 10/31/2016     Multifocal pneumonia 09/25/2016     Chronic hyponatremia 09/25/2016     Constipation 04/10/2014     Bulimia nervosa in remission 03/19/2014       LOS (days): 0  Geometric Mean LOS (GMLOS) (days):   Days to GMLOS:     OBJECTIVE:    Risk of Unplanned Readmission Score: 33.31         Current admission status: Inpatient       Preferred Pharmacy:   [facility]pharm[facility] #[id number] - [place], [place] - [address]  [address] [address]  Phone: [phone] Fax: [phone]    [facility] [place] - [place], [place] - [address]  [address]  [place] [address]  Phone: [phone] Fax: [phone]    Primary Care Provider: Raheem Cain MD    Primary Insurance: [employer] REP  Secondary Insurance: [facility]    ASSESSMENT:  Active Health Care Proxies       María, Fred Health Care Representative - Life Partner   Primary Phone: [phone] (Home)                  Patient Information  Admitted from:: Home  Mental Status: Alert  During Assessment patient was accompanied by: Not accompanied during assessment  Assessment information provided by:: Patient  Primary Caregiver: Self  Support Systems: Self, Spouse/significant other  What city do you live in?: [place]  Home entry access options. Select all that apply.: Stairs  Number of steps to enter home.: 2  Type of Current Residence: [facility]  Living Arrangements: Lives Alone  Is patient a ?: No    Activities of Daily Living Prior to Admission  Functional Status: Independent  Completes ADLs independently?: Yes  Ambulates independently?: Yes  Does patient use assisted devices?: No  Does patient currently own DME?: Yes  What DME does the patient currently own?: Walker  Does patient have a history of Outpatient Therapy (PT/OT)?: No  Does the patient have a history of Short-Term Rehab?: Yes ([facility])  Does patient have a history of HHC?: Yes (unsure of agency)  Does patient currently have HHC?: No    Patient Information Continued  Income Source: Unknown  Does patient have prescription coverage?: Yes  Does patient receive dialysis treatments?: No  Does patient have a history of substance abuse?: No  Does patient have a history of Mental Health Diagnosis?: Yes (anxiety)  Is patient receiving treatment for mental health?: Yes  Has patient received inpatient treatment related to mental health in the last 2 years?: No    Means of Transportation  Means of Transport to Appts:: Family transport          DISCHARGE DETAILS:    Discharge planning discussed with:: Patient     Comments - Freedom of Choice: Discussed FOC  CM contacted family/caregiver?: No- see comments (declined)    This CM introduced self and role to patient, lives alone in ranch style home, [address].  IADLS, has walker at home (does not currently use).  Has history of schizoaffective d/o, anxiety, states she was hospitalized over 20 years ago.  Has history of  STR at [facility],  (unsure of agency), no hx of OP reported

## 2024-12-23 NOTE — ASSESSMENT & PLAN NOTE
"Presented to ER with c/o productive cough (duration unclear), new onset SOB, generalized weakness and malaise. Additionally with R sided pleuritic CP.   Noted to require 4L via O2 in ER per HPI though do not see any documented hypoxia on VS in EPIC  CT CAP w contrast showed \"Faint patchy consolidations throughout the lungs, as described, which may be secondary to aspiration or multifocal pneumonia. Recommend 3-month follow-up noncontrast CT of the chest to assess stability/resolution\"  Started on IV Zosyn, initial procal negative. Trend and consider dc abx if serial procal negative  Trend CBC, temp curve and O2 sats  Pulmonology was consulted by admitting MD, f/u input  "

## 2024-12-23 NOTE — ASSESSMENT & PLAN NOTE
Briefly, patient is a 64-year-old female with a history of severe gastroparesis, GERD, hiatal hernia s/p cTIF in 2021, previous rosas's esophagus who presented with progressive malaise, shortness of breath, cough.  She also reports slightly worsening nausea and vomiting over the last few weeks and was found to have concerns for multiple/aspiration pneumonia on CT chest.  CT abdomen and pelvis showed possible inflammation thickening in the distal esophagus with narrowing and fluid throughout the more proximal esophagus.  Also finding of small hiatal hernia    Differential diagnosis include viral gastroenteritis, worsening gastroparesis,  worsening esophagitis, distal esophageal peptic stricture, structural/functional issue with TIF    Plan  - Agree SLP evaluation today. Okay for diet today if tolerated  - EGD tomorrow. NPO at midnight  - Resume home dose Reglan 5 mg p.o. 3 times daily  - Continue PPI BID

## 2024-12-23 NOTE — ASSESSMENT & PLAN NOTE
Chronic, associated with barrets esophagus  Patient scheduled for outpatient EGD  Will get GI input as aspiration seems to be the primary reason for respiratory failure at this time   Consult placed to GI  Will also consult pulmonology as patient has high requirement of 02 and will need optimization  Continue with protonix BID, switch to IV BID due to dysphagia  SLP eval pending

## 2024-12-23 NOTE — ASSESSMENT & PLAN NOTE
Thought to be due to psych meds per note review, chronic  Received 2L IVF prior to admission  Improved s/p IVF  BMP in AM

## 2024-12-23 NOTE — ASSESSMENT & PLAN NOTE
" >>ASSESSMENT AND PLAN FOR EROSIVE ESOPHAGITIS WRITTEN ON 12/23/2024 10:38 AM BY NACHO SUAREZ PA-C    Chronic, associated with barrets esophagus, patient was scheduled for outpatient EGD. Imaging upon arrival showing \"Similar appearance of narrowing of the distal esophagus just proximal to the GE junction. Fluid throughout the esophagus. Recommend endoscopy if not recently performed to assess for the possibility of underlying lesion versus stricture\"  Appreciate GI input as aspiration seems to be the primary reason for respiratory failure at this time   Continue IV PPI  SLP eval ordered for after GI evaluation   "

## 2024-12-23 NOTE — ASSESSMENT & PLAN NOTE
"Chronic, associated with barrets esophagus, patient was scheduled for outpatient EGD. Imaging upon arrival showing \"Similar appearance of narrowing of the distal esophagus just proximal to the GE junction. Fluid throughout the esophagus. Recommend endoscopy if not recently performed to assess for the possibility of underlying lesion versus stricture\"  Appreciate GI input as aspiration seems to be the primary reason for respiratory failure at this time   Continue IV PPI  SLP eval ordered for after GI evaluation   "

## 2024-12-23 NOTE — ED PROVIDER NOTES
Time reflects when diagnosis was documented in both MDM as applicable and the Disposition within this note       Time User Action Codes Description Comment    12/23/2024  4:31 AM Yuni Arnold Add [R09.02] Hypoxia     12/23/2024  4:31 AM Yuni Arnold Add [J18.9] Pneumonia     12/23/2024  4:32 AM Yuni Arnold Add [R93.89] Abnormal CT scan     12/23/2024  4:32 AM Yuni Arnold Add [R11.10] Vomiting     12/23/2024  4:32 AM Yuni Arnold Add [R79.89] Elevated lactic acid level     12/23/2024  4:32 AM Yuni Arnold [E87.1] Hyponatremia           ED Disposition       ED Disposition   Admit    Condition   Stable    Date/Time   Mon Dec 23, 2024  4:33 AM    Comment   Case was discussed with Dr. MENDEZ and the patient's admission status was agreed to be Admission Status: inpatient status to the service of    .               Assessment & Plan       Medical Decision Making  Amount and/or Complexity of Data Reviewed  Labs: ordered. Decision-making details documented in ED Course.  Radiology: ordered. Decision-making details documented in ED Course.    Risk  Prescription drug management.  Decision regarding hospitalization.    Patient is 64-year-old female with past medical history of hypertension, hyperlipidemia, GERD presenting to ED for evaluation of shortness of breath. See history and physical documented below.         Differential diagnosis included but not limited to pneumonia, aspiration, viral syndrome,electrolyte abnormality, ACS, doubt PE. Plan sepsis workup, CT chest, abdomen, pelvis will require admission due to new oxygen requirement    View ED course above for further discussion on patient workup.       All labs reviewed and utilized in the medical decision making process  All radiology studies independently viewed by me and interpreted by the radiologist.  I reviewed all testing with the patient.       Case discussed with SLIM and admitted for acute hypoxic respiratory failure             ED Course as of 12/23/24 0433   Mon Dec 23, 2024   0254 LACTIC ACID(!): 2.8   0254 Sodium(!): 127   0255 WBC: 10.11   0255 Hemoglobin(!): 10.7   0255 Platelet Count: 215   0255 Blood, UA(!): Trace   0255 Nitrite, UA: Negative   0255 Leukocytes, UA: Negative   0255 PROTIME: 12.9   0255 POCT INR: 0.94   0255 FLU/COVID Rapid Antigen (30 min. TAT) - Preferred screening test in ED  Negative    0321 Procalcitonin: <0.05   0321 hs TnI 0hr: <2   0341 CT chest abdomen pelvis w contrast  IMPRESSION:     Faint patchy consolidations throughout the lungs, as described, which may be secondary to aspiration or multifocal pneumonia. Recommend 3-month follow-up noncontrast CT of the chest to assess stability/resolution.     Similar appearance of narrowing of the distal esophagus just proximal to the GE junction. Fluid throughout the esophagus. Recommend endoscopy if not recently performed to assess for the possibility of underlying lesion versus stricture.     0353 hs TnI 0hr: <2       Medications   vancomycin (VANCOCIN) 2,000 mg in sodium chloride 0.9 % 500 mL IVPB (has no administration in time range)   sodium chloride 0.9 % bolus 1,000 mL (has no administration in time range)   albuterol (FOR EMS ONLY) (2.5 mg/3 mL) 0.083 % inhalation solution 5 mg (0 mg Does not apply Given to EMS 12/23/24 0106)   ondansetron (FOR EMS ONLY) (ZOFRAN) 4 mg/2 mL injection 4 mg (0 mg Does not apply Given to EMS 12/23/24 0106)   methylPREDNISolone sodium succinate (FOR EMS ONLY) (Solu-MEDROL) 125 MG injection 125 mg (0 mg Does not apply Given to EMS 12/23/24 0106)   ipratropium-albuterol (FOR EMS ONLY) (DUO-NEB) 0.5-2.5 mg/3 mL inhalation solution 3 mL (0 mL Does not apply Given to EMS 12/23/24 0106)   metoclopramide (REGLAN) injection 10 mg (10 mg Intravenous Given 12/23/24 0231)   iohexol (OMNIPAQUE) 350 MG/ML injection (SINGLE-DOSE) 100 mL (100 mL Intravenous Given 12/23/24 0228)   piperacillin-tazobactam (ZOSYN) 4.5 g in sodium chloride  0.9 % 100 mL IVPB (4.5 g Intravenous New Bag 12/23/24 7480)       ED Risk Strat Scores                                              History of Present Illness       Chief Complaint   Patient presents with    Shortness of Breath     Pt here for worsening cough for the past few days and vomiting. Pt given 125 mg solumedrol, 4 mg zofran and breathing treatment by EMS. Pt also hypotensive for EMS, 80s systolic and given fluids.       Past Medical History:   Diagnosis Date    Anemia     Anxiety     Arthritis     Back pain at L4-L5 level     Schreiber esophagus     Bulimia     Colon polyp     Disease of thyroid gland     hypothyroid    GERD (gastroesophageal reflux disease)     Hearing loss in left ear     History of transfusion     Hyperlipidemia     Hypertension     Hypothyroidism     Leukopenia     NMS (neuroleptic malignant syndrome) 01/03/2020    Pneumonia     RA (rheumatoid arthritis) (HCC)     in feet    Sciatica     Seizure (HCC)     due to medication mix up 8/2018 only one historically    Skin spots, red     lower right arm - poss from cat- no s/s infection    Synovial cyst of lumbar spine     Vertigo     Wears dentures     full set    Weight gain       Past Surgical History:   Procedure Laterality Date    BONE MARROW BIOPSY      BREAST SURGERY      enlargement with implants    CLOSED REDUCTION DISTAL FEMUR FRACTURE      COLONOSCOPY      COSMETIC SURGERY      DENTAL SURGERY      HIP ARTHROPLASTY Right 01/02/2020    Procedure: REVISION TOTAL HIP ARTHROPLASTY WITH REPAIR OF PARIPROSTHETIC FRACTURE - POSTERIOR APPROACH;  Surgeon: Dilan Pedersen MD;  Location: [facility];  Service: Orthopedics    ME AMPUTATION METATARSAL W/TOE SINGLE Left 04/07/2023    Procedure: AMPUTATION TOE 4th;  Surgeon: Conner Bartlett DPM;  Location:  MAIN OR;  Service: Podiatry    ME ARTHRP ACETBLR/PROX FEM PROSTC AGRFT/ALGRFT Right 12/11/2019    Procedure: ARTHROPLASTY HIP TOTAL ANTERIOR;  Surgeon: Dilan Pedersen MD;  Location:  MAIN OR;   Service: Orthopedics    PA ESOPHAGOGASTRODUODENOSCOPY TRANSORAL DIAGNOSTIC N/A 05/11/2021    Procedure: ESOPHAGOGASTRODUODENOSCOPY (EGD);  Surgeon: David Cedeño MD;  Location: [facility];  Service: General    PA LAPS RPR PARAESPHGL HRNA INCL FUNDPLSTY W/MESH N/A 05/11/2021    Procedure: REPAIR HERNIA PARAESOPHAGEAL  LAPAROSCOPIC;  Surgeon: David Cedeño MD;  Location: [facility];  Service: General    PA UNLISTED PROCEDURE ESOPHAGUS N/A 05/11/2021    Procedure: FUNDOPLICATION TRANSORAL INCISIONLESS;  Surgeon: Brad Cadet MD;  Location: [facility];  Service: Gastroenterology    RHINOPLASTY      SINUS SURGERY      TOE AMPUTATION Left     2023 4th toe    TRANSORAL INCISIONLESS FUNDOPLICATION (TIF)  05/11/2021      Family History   Problem Relation Age of Onset    Uterine cancer Mother     Esophageal cancer Father     Colon cancer Maternal Grandmother       Social History     Tobacco Use    Smoking status: Never     Passive exposure: Past    Smokeless tobacco: Never   Vaping Use    Vaping status: Never Used   Substance Use Topics    Alcohol use: Not Currently    Drug use: Not Currently      E-Cigarette/Vaping    E-Cigarette Use Never User       E-Cigarette/Vaping Substances    Nicotine No     THC No     CBD No     Flavoring No     Other No     Unknown No       I have reviewed and agree with the history as documented.     HPI  Patient is 64-year-old female with past medical history of hypertension, hyperlipidemia, GERD presenting to ED for evaluation of shortness of breath.  Patient reports chronic cough for several months which has worsened over the last 3 days.  Patient reports cough now productive of sputum.  Also reports last night began vomiting.  Today became acutely short of breath and called EMS.  Per EMS patient found to be hypoxic and hypotensive, given 125 mg Solu-Medrol and breathing treatment as well as 1 L of fluids.  Patient endorses intermittent chest pain secondary to cough.  She also reports has  had ongoing incontinence issues. Denies fever, chills, diarrhea.         Review of Systems   Constitutional:  Negative for chills and fever.   HENT:  Negative for ear pain and sore throat.    Respiratory:  Positive for cough and shortness of breath.    Cardiovascular:  Negative for chest pain, palpitations and leg swelling.   Gastrointestinal:  Positive for vomiting. Negative for abdominal pain, diarrhea and nausea.   Genitourinary:  Negative for dysuria, frequency and hematuria.        Incontinence    Musculoskeletal:  Negative for back pain and neck pain.   Skin:  Negative for rash.   Neurological:  Negative for dizziness, light-headedness and headaches.           Objective       ED Triage Vitals   Temperature Pulse Blood Pressure Respirations SpO2 Patient Position - Orthostatic VS   12/23/24 0102 12/23/24 0101 12/23/24 0102 12/23/24 0101 12/23/24 0101 12/23/24 0101   97.6 °F (36.4 °C) (!) 110 111/68 22 100 % Lying      Temp Source Heart Rate Source BP Location FiO2 (%) Pain Score    12/23/24 0102 -- 12/23/24 0101 -- --    Oral  Right arm        Vitals      Date and Time Temp Pulse SpO2 Resp BP Pain Score FACES Pain Rating User   12/23/24 0315 -- 98 97 % 22 111/63 -- -- MB   12/23/24 0102 97.6 °F (36.4 °C) -- -- -- 111/68 -- -- MB   12/23/24 0101 -- 110 100 % 22 -- -- -- MB            Physical Exam  Vitals reviewed.   Constitutional:       General: She is awake.      Appearance: She is ill-appearing.      Comments: Hypoxic on RA   HENT:      Head: Normocephalic and atraumatic.      Mouth/Throat:      Mouth: Mucous membranes are moist.   Eyes:      Extraocular Movements: Extraocular movements intact.      Right eye: No nystagmus.      Left eye: No nystagmus.      Conjunctiva/sclera: Conjunctivae normal.      Pupils: Pupils are equal, round, and reactive to light.   Cardiovascular:      Rate and Rhythm: Regular rhythm. Tachycardia present.      Pulses: Normal pulses.      Heart sounds: Normal heart sounds, S1 normal  and S2 normal. Heart sounds not distant. No murmur heard.     No friction rub. No gallop.   Pulmonary:      Effort: Tachypnea present.      Breath sounds: No stridor.      Comments: Coarse breath sounds b/l   Abdominal:      General: Bowel sounds are normal.      Palpations: Abdomen is soft.      Tenderness: There is no abdominal tenderness.   Musculoskeletal:      Right lower leg: No edema.      Left lower leg: No edema.   Skin:     General: Skin is warm and dry.      Capillary Refill: Capillary refill takes less than 2 seconds.   Neurological:      Mental Status: She is alert and oriented to person, place, and time.      GCS: GCS eye subscore is 4. GCS verbal subscore is 5. GCS motor subscore is 6.      Cranial Nerves: Cranial nerves 2-12 are intact.      Sensory: Sensation is intact.      Motor: No weakness or pronator drift.      Coordination: Coordination normal. Finger-Nose-Finger Test normal.         Results Reviewed       Procedure Component Value Units Date/Time    CBC and differential [[id number]]  (Abnormal) Collected: 12/23/24 0159    Lab Status: Final result Specimen: Blood from Arm, Right Updated: 12/23/24 0427     WBC 10.11 Thousand/uL      RBC 3.96 Million/uL      Hemoglobin 10.7 g/dL      Hematocrit 33.2 %      MCV 84 fL      MCH 27.0 pg      MCHC 32.2 g/dL      RDW 13.7 %      MPV 8.5 fL      Platelets 215 Thousands/uL     Narrative:      This is an appended report.  These results have been appended to a previously verified report.    Manual Differential(PHLEBS Do Not Order) [[id number]]  (Abnormal) Collected: 12/23/24 0159    Lab Status: Final result Specimen: Blood from Arm, Right Updated: 12/23/24 0427     Segmented % 91 %      Lymphocytes % 2 %      Monocytes % 4 %      Eosinophils % 0 %      Basophils % 0 %      Atypical Lymphocytes % 3 %      Absolute Neutrophils 9.20 Thousand/uL      Absolute Lymphocytes 0.51 Thousand/uL      Absolute Monocytes 0.40 Thousand/uL      Absolute Eosinophils 0.00  Thousand/uL      Absolute Basophils 0.00 Thousand/uL      Total Counted --     RBC Morphology Present     Platelet Estimate Adequate     Anisocytosis Present     Macrocytes Present     Microcytes Present     Ovalocytes Present    RBC Morphology Reflex Test [[id number]] Collected: 12/23/24 0159    Lab Status: In process Specimen: Blood from Arm, Right Updated: 12/23/24 0427    Lactic acid 2 Hours [[id number]] Collected: 12/23/24 0422    Lab Status: In process Specimen: Blood from Arm, Right Updated: 12/23/24 0426    HS Troponin I 4hr [[id number]]     Lab Status: No result Specimen: Blood     Urine Microscopic [[id number]]  (Abnormal) Collected: 12/23/24 0159    Lab Status: Final result Specimen: Urine, Straight Cath Updated: 12/23/24 0246     RBC, UA 2-4 /hpf      WBC, UA None Seen /hpf      Epithelial Cells Occasional /hpf      Bacteria, UA Occasional /hpf      Renal Epithelial Cells occasional    Procalcitonin [[id number]]  (Normal) Collected: 12/23/24 0159    Lab Status: Final result Specimen: Blood from Arm, Right Updated: 12/23/24 0238     Procalcitonin <0.05 ng/ml     Blood culture #2 [[id number]] Collected: 12/23/24 0159    Lab Status: In process Specimen: Blood from Arm, Right Updated: 12/23/24 0238    HS Troponin I 2hr [[id number]]     Lab Status: No result Specimen: Blood     HS Troponin 0hr (reflex protocol) [[id number]]  (Normal) Collected: 12/23/24 0159    Lab Status: Final result Specimen: Blood from Arm, Right Updated: 12/23/24 0232     hs TnI 0hr <2 ng/L     Comprehensive metabolic panel [[id number]]  (Abnormal) Collected: 12/23/24 0159    Lab Status: Final result Specimen: Blood from Arm, Right Updated: 12/23/24 0232     Sodium 127 mmol/L      Potassium 3.6 mmol/L      Chloride 93 mmol/L      CO2 23 mmol/L      ANION GAP 11 mmol/L      BUN 11 mg/dL      Creatinine 0.71 mg/dL      Glucose 137 mg/dL      Calcium 8.3 mg/dL      AST 19 U/L      ALT 12 U/L      Alkaline Phosphatase 66 U/L      Total Protein 5.8  g/dL      Albumin 3.7 g/dL      Total Bilirubin 0.24 mg/dL      eGFR 90 ml/min/1.73sq m     Narrative:      National Kid[employer] guidelines for Chronic Kidney Disease (CKD):     Stage 1 with normal or high GFR (GFR > 90 mL/min/1.73 square meters)    Stage 2 Mild CKD (GFR = 60-89 mL/min/1.73 square meters)    Stage 3A Moderate CKD (GFR = 45-59 mL/min/1.73 square meters)    Stage 3B Moderate CKD (GFR = 30-44 mL/min/1.73 square meters)    Stage 4 Severe CKD (GFR = 15-29 mL/min/1.73 square meters)    Stage 5 End Stage CKD (GFR <15 mL/min/1.73 square meters)  Note: GFR calculation is accurate only with a steady state creatinine    Lactic acid [[id number]]  (Abnormal) Collected: 12/23/24 0159    Lab Status: Final result Specimen: Blood from Arm, Right Updated: 12/23/24 0232     LACTIC ACID 2.8 mmol/L     Narrative:      Result may be elevated if tourniquet was used during collection.    UA w Reflex to Microscopic w Reflex to Culture [[id number]]  (Abnormal) Collected: 12/23/24 0159    Lab Status: Final result Specimen: Urine, Straight Cath Updated: 12/23/24 0229     Color, UA Yellow     Clarity, UA Cloudy     Specific Gravity, UA 1.025     pH, UA 5.5     Leukocytes, UA Negative     Nitrite, UA Negative     Protein, UA Trace mg/dl      Glucose, UA Negative mg/dl      Ketones, UA Negative mg/dl      Urobilinogen, UA <2.0 mg/dl      Bilirubin, UA Negative     Occult Blood, UA Trace    FLU/COVID Rapid Antigen (30 min. TAT) - Preferred screening test in ED [[id number]]  (Normal) Collected: 12/23/24 0147    Lab Status: Final result Specimen: Nares from Nose Updated: 12/23/24 0223     SARS COV Rapid Antigen Negative     Influenza A Rapid Antigen Negative     Influenza B Rapid Antigen Negative    Narrative:      This test has been performed using the [employer] Danette 2 FLU+SARS Antigen test under the Emergency Use Authorization (EUA). This test has been validated by the  and verified by the performing  laboratory. The Danette uses lateral flow immunofluorescent sandwich assay to detect SARS-COV, Influenza A and Influenza B Antigen.     The [employer] Danette 2 SARS Antigen test does not differentiate between SARS-CoV and SARS-CoV-2.     Negative results are presumptive and may be confirmed with a molecular assay, if necessary, for patient management. Negative results do not rule out SARS-CoV-2 or influenza infection and should not be used as the sole basis for treatment or patient management decisions. A negative test result may occur if the level of antigen in a sample is below the limit of detection of this test.     Positive results are indicative of the presence of viral antigens, but do not rule out bacterial infection or co-infection with other viruses.     All test results should be used as an adjunct to clinical observations and other information available to the provider.    FOR PEDIATRIC PATIENTS - copy/paste COVID Guidelines URL to browser: [email or web address]    Protime-INR [[id number]]  (Normal) Collected: 12/23/24 0159    Lab Status: Final result Specimen: Blood from Arm, Right Updated: 12/23/24 0221     Protime 12.9 seconds      INR 0.94    Narrative:      INR Therapeutic Range    Indication                                             INR Range      Atrial Fibrillation                                               2.0-3.0  Hypercoagulable State                                    2.0.2.3  Left Ventricular Asist Device                            2.0-3.0  Mechanical Heart Valve                                  -    Aortic(with afib, MI, embolism, HF, LA enlargement,    and/or coagulopathy)                                     2.0-3.0 (2.5-3.5)     Mitral                                                             2.5-3.5  Prosthetic/Bioprosthetic Heart Valve               2.0-3.0  Venous thromboembolism (VTE: VT, PE        2.0-3.0    APTT [[id number]]   (Normal) Collected: 12/23/24 0159    Lab Status: Final result Specimen: Blood from Arm, Right Updated: 12/23/24 0221     PTT 28 seconds     Blood culture #1 [[id number]] Updated: 12/23/24 0205    Lab Status: In process Specimen: Blood             CT chest abdomen pelvis w contrast   Final Interpretation by Rakan Harvey DO (12/23 0333)      Faint patchy consolidations throughout the lungs, as described, which may be secondary to aspiration or multifocal pneumonia. Recommend 3-month follow-up noncontrast CT of the chest to assess stability/resolution.      Similar appearance of narrowing of the distal esophagus just proximal to the GE junction. Fluid throughout the esophagus. Recommend endoscopy if not recently performed to assess for the possibility of underlying lesion versus stricture.         The study was marked in EPIC for immediate notification.      Workstation performed: [id number]             Procedures    ED Medication and Procedure Management   Prior to Admission Medications   Prescriptions Last Dose Informant Patient Reported? Taking?   LORazepam (ATIVAN) 2 mg tablet   No No   Sig: Take 1 tablet (2 mg total) by mouth every 6 (six) hours as needed for anxiety Max 5 times a day   PARoxetine (PAXIL) 30 mg tablet   No No   Sig: TAKE 2 TABLETS (60 MG TOTAL) BY MOUTH IN THE MORNING   QUEtiapine (SEROquel XR) 400 mg 24 hr tablet  Self No No   Sig: TAKE 1 TABLET (400 MG TOTAL) BY MOUTH DAILY AT BEDTIME   Semaglutide-Weight Management (Wegovy) 0.25 MG/0.5ML   No No   Sig: Inject 0.25 mg under the skin weekly   amLODIPine (NORVASC) 5 mg tablet  Self No No   Sig: TAKE 1 TABLET (5 MG TOTAL) BY MOUTH DAILY.   benzonatate (TESSALON) 200 MG capsule   No No   Sig: Take 1 capsule (200 mg total) by mouth 3 (three) times a day as needed for cough   celecoxib (CeleBREX) 200 mg capsule   Yes No   Sig: Take 200 mg by mouth daily   levothyroxine 25 mcg tablet  Self No No   Sig: TAKE 1 TABLET BY MOUTH EVERY DAY   loratadine  (CLARITIN) 10 mg tablet   No No   Sig: Take 1 tablet (10 mg total) by mouth daily   metoclopramide (REGLAN) 5 mg tablet   No No   Sig: TAKE 1 TABLET BY MOUTH 3 TIMES A DAY BEFORE MEALS.   ondansetron (ZOFRAN) 4 mg tablet   No No   Sig: Take 1 tablet (4 mg total) by mouth every 8 (eight) hours as needed for nausea or vomiting   pantoprazole (PROTONIX) 40 mg tablet   No No   Sig: TAKE 1 TABLET BY MOUTH TWICE A DAY   sucralfate (CARAFATE) 1 g tablet   No No   Sig: TAKE 1 TABLET (1 G TOTAL) BY MOUTH 3 (THREE) TIMES A DAY WITH MEALS      Facility-Administered Medications: None     Patient's Medications   Discharge Prescriptions    No medications on file     No discharge procedures on file.  ED SEPSIS DOCUMENTATION   Time reflects when diagnosis was documented in both MDM as applicable and the Disposition within this note       Time User Action Codes Description Comment    12/23/2024  4:31 AM Yuni Arnold [R09.02] Hypoxia     12/23/2024  4:31 AM Yuni Arnold [J18.9] Pneumonia     12/23/2024  4:32 AM Yuni Arnold [R93.89] Abnormal CT scan     12/23/2024  4:32 AM Yuni Arnold [R11.10] Vomiting     12/23/2024  4:32 AM Yuni Arnold [R79.89] Elevated lactic acid level     12/23/2024  4:32 AM Yuni Arnold [E87.1] Hyponatremia                  Yuni Arnold DO  12/30/24 5829

## 2024-12-23 NOTE — ASSESSMENT & PLAN NOTE
Presented to ER with c/o productive cough (duration unclear), new onset SOB, generalized weakness and malaise. Additionally with R sided pleuritic CP.  Ddx: Pneumonia versus aspiration pneumonitis/pneumonia  Patient is afebrile, w/o leukocytosis, normal procalcitonin  RSV/COVID/influenza is negative  Leaning towards pneumonitis the patient has episodes of similar admissions for same complaint going back to August   Pending cultures and serial procal levels  F/u sputum culture, gram stain, urine legionella/strep  Follow up blood cultures from admission  Supportive care with nebs and anti-tussives  NP until speech eval however also NPO for possible GI procedure

## 2024-12-23 NOTE — PROGRESS NOTES
"Progress Note - Hospitalist   Name: Irene Plascencia 64 y.o. female I MRN: [id number]  Unit/Bed#: [facility] 77[facility]-01 I Date of Admission: 12/23/2024   Date of Service: 12/23/2024 I Hospital Day: 0    Assessment & Plan  Acute hypoxic respiratory failure (HCC)  Presented to ER with c/o productive cough (duration unclear), new onset SOB, generalized weakness and malaise. Additionally with R sided pleuritic CP.   Noted to require 4L via O2 in ER per HPI though do not see any documented hypoxia on VS in EPIC  CT CAP w contrast showed \"Faint patchy consolidations throughout the lungs, as described, which may be secondary to aspiration or multifocal pneumonia. Recommend 3-month follow-up noncontrast CT of the chest to assess stability/resolution\"  Started on IV Zosyn, initial procal negative. Trend and consider dc abx if serial procal negative  Trend CBC, temp curve and O2 sats  Pulmonology was consulted by admitting MD, f/u input  Multifocal pneumonia  Pneumonia noted on CT vs aspiration pneumonitis  Patient is afebrile, w/o leukocytosis, normal procalcitonin  RSV/COVID/influenza is negative  Leaning towards pneumonitis however patient toxic appearing on admission thus started on IV Zosyn  Continue for now, de-escalate pending cultures and serial procal levels  F/u sputum culture, gram stain, urine legionella/strep  Follow up blood cultures from admission  Supportive care with nebs and anti-tussives  NP until speech eval however also NPO for possible GI procedure    Erosive esophagitis  Chronic, associated with barrets esophagus, patient was scheduled for outpatient EGD. Imaging upon arrival showing \"Similar appearance of narrowing of the distal esophagus just proximal to the GE junction. Fluid throughout the esophagus. Recommend endoscopy if not recently performed to assess for the possibility of underlying lesion versus stricture\"  Appreciate GI input as aspiration seems to be the primary reason for respiratory failure at " this time   Continue IV PPI  SLP eval ordered for after GI evaluation   Chronic hyponatremia  Thought to be due to psych meds per note review, chronic  Received 2L IVF prior to admission  Improved s/p IVF  BMP in AM    Hyperlipidemia  Continue with statin  Primary hypertension  BP was low on presentation   Holding home amlodipine at this time, resume as able  Schizoaffective disorder (HCC)  Continue home meds   Lactic acidosis  S/p IVF   Trend till normal  Gastroparesis  Resume reglan with meals once tolerating po       VTE Pharmacologic Prophylaxis:   Moderate Risk (Score 3-4) - Pharmacological DVT Prophylaxis Ordered: enoxaparin (Lovenox).    Mobility:   -[facility] Achieved: 2: Bed activities/Dependent transfer  -M Goal achieved. Continue to encourage appropriate mobility.    Patient Centered Rounds: I performed bedside rounds with nursing staff today.   Discussions with Specialists or Other Care Team Provider: appreciate pulm and GI input     Education and Discussions with Family / Patient: Patient declined call to .     Current Length of Stay: 0 day(s)  Current Patient Status: Inpatient   Certification Statement: The patient will continue to require additional inpatient hospital stay due to IV abx, ?GI intervention   Discharge Plan: Anticipate discharge in >72 hrs to D    Code Status: Level 1 - Full Code    Subjective   Doing okay. Sleepy. She reports her chest is a bit heavy. Coughing up green mucous. Breathing not good but is a bit improved from when she came in.     Objective :  Temp:  [97.6 °F (36.4 °C)-99 °F (37.2 °C)] 99 °F (37.2 °C)  HR:  [] 106  BP: (111-120)/(63-79) 120/79  Resp:  [22-23] 23  SpO2:  [96 %-100 %] 97 %  O2 Device: Nasal cannula  Nasal Cannula O2 Flow Rate (L/min):  [2 L/min-4 L/min] 2 L/min    Body mass index is 35.2 kg/m².     Input and Output Summary (last 24 hours):     Intake/Output Summary (Last 24 hours) at 12/23/2024 1038  Last data filed at 12/23/2024  0506  Gross per 24 hour   Intake 200 ml   Output --   Net 200 ml       Physical Exam  Vitals and nursing note reviewed.   Constitutional:       General: She is not in acute distress.     Appearance: She is ill-appearing.      Comments: On nasal cannula    Cardiovascular:      Rate and Rhythm: Normal rate.   Pulmonary:      Breath sounds: Rhonchi present.   Abdominal:      General: There is no distension.      Palpations: Abdomen is soft.      Tenderness: There is no abdominal tenderness.   Musculoskeletal:      Right lower leg: No edema.      Left lower leg: No edema.   Skin:     Coloration: Skin is pale.   Neurological:      Mental Status: She is alert and oriented to person, place, and time.           Lines/Drains:              Lab Results: I have reviewed the following results:   Results from last 7 days   Lab Units 12/23/24  0643 12/23/24  0159   WBC Thousand/uL  --  10.11   HEMOGLOBIN g/dL 11.2* 10.7*   HEMATOCRIT % 35.5 33.2*   PLATELETS Thousands/uL  --  215   LYMPHO PCT %  --  2*   MONO PCT %  --  4   EOS PCT %  --  0     Results from last 7 days   Lab Units 12/23/24  0643 12/23/24  0159   SODIUM mmol/L 131* 127*   POTASSIUM mmol/L 4.1 3.6   CHLORIDE mmol/L 100 93*   CO2 mmol/L 21 23   BUN mg/dL 9 11   CREATININE mg/dL 0.71 0.71   ANION GAP mmol/L 10 11   CALCIUM mg/dL 8.1* 8.3*   ALBUMIN g/dL  --  3.7   TOTAL BILIRUBIN mg/dL  --  0.24   ALK PHOS U/L  --  66   ALT U/L  --  12   AST U/L  --  19   GLUCOSE RANDOM mg/dL 139 137     Results from last 7 days   Lab Units 12/23/24  0159   INR  0.94             Results from last 7 days   Lab Units 12/23/24  0643 12/23/24  0422 12/23/24  0159   LACTIC ACID mmol/L 2.9* 3.3* 2.8*   PROCALCITONIN ng/ml  --   --  <0.05       Recent Cultures (last 7 days):   Results from last 7 days   Lab Units 12/23/24  0901 12/23/24  0159   BLOOD CULTURE  Received in Microbiology Lab. Culture in Progress. Received in Microbiology Lab. Culture in Progress.       Imaging Results Review: No  pertinent imaging studies reviewed.  Other Study Results Review: No additional pertinent studies reviewed.    Last 24 Hours Medication List:     Current Facility-Administered Medications:     albuterol inhalation solution 2.5 mg, Q4H PRN    benzonatate (TESSALON PERLES) capsule 200 mg, TID PRN    enoxaparin (LOVENOX) subcutaneous injection 40 mg, Daily    levalbuterol (XOPENEX) inhalation solution 1.25 mg, TID    metoclopramide (REGLAN) injection 5 mg, TID    ondansetron (ZOFRAN) injection 4 mg, Q6H PRN    pantoprazole (PROTONIX) injection 40 mg, Q12H YUMIKO    piperacillin-tazobactam (ZOSYN) 4.5 g in sodium chloride 0.9 % 100 mL IVPB (EXTENDED INFUSION), Q8H, Last Rate: 4.5 g (12/23/24 0857)    Administrative Statements   Today, Patient Was Seen By: Barbara Lopez PA-C      **Please Note: This note may have been constructed using a voice recognition system.**

## 2024-12-23 NOTE — SPEECH THERAPY NOTE
Speech Language/Pathology  Speech-Language Pathology Bedside Swallow Evaluation      Patient Name: Irene Plascencia    Today's Date: 12/23/2024     Problem List  Principal Problem:    Acute hypoxic respiratory failure (HCC)  Active Problems:    Multifocal pneumonia    Chronic hyponatremia    Schizoaffective disorder (HCC)    Primary hypertension    Lactic acidosis    Erosive esophagitis    Hyperlipidemia    Gastroparesis      Past Medical History  Past Medical History:   Diagnosis Date    Anemia     Anxiety     Arthritis     Back pain at L4-L5 level     Schreiber esophagus     Bulimia     Colon polyp     Disease of thyroid gland     hypothyroid    GERD (gastroesophageal reflux disease)     Hearing loss in left ear     History of transfusion     Hyperlipidemia     Hypertension     Hypothyroidism     Leukopenia     NMS (neuroleptic malignant syndrome) 01/03/2020    Pneumonia     RA (rheumatoid arthritis) (HCC)     in feet    Sciatica     Seizure ([facility])     due to medication mix up 8/2018 only one historically    Skin spots, red     lower right arm - poss from cat- no s/s infection    Synovial cyst of lumbar spine     Vertigo     Wears dentures     full set    Weight gain        Past Surgical History  Past Surgical History:   Procedure Laterality Date    BONE MARROW BIOPSY      BREAST SURGERY      enlargement with implants    CLOSED REDUCTION DISTAL FEMUR FRACTURE      COLONOSCOPY      COSMETIC SURGERY      DENTAL SURGERY      HIP ARTHROPLASTY Right 01/02/2020    Procedure: REVISION TOTAL HIP ARTHROPLASTY WITH REPAIR OF PARIPROSTHETIC FRACTURE - POSTERIOR APPROACH;  Surgeon: Dilan Pedersen MD;  Location:  MAIN OR;  Service: Orthopedics    KY AMPUTATION METATARSAL W/TOE SINGLE Left 04/07/2023    Procedure: AMPUTATION TOE 4th;  Surgeon: Conner Bartlett DPM;  Location:  MAIN OR;  Service: Podiatry    KY ARTHRP ACETBLR/PROX FEM PROSTC AGRFT/ALGRFT Right 12/11/2019    Procedure: ARTHROPLASTY HIP TOTAL ANTERIOR;   "Surgeon: Dilan Pedersen MD;  Location: [facility];  Service: Orthopedics    OK ESOPHAGOGASTRODUODENOSCOPY TRANSORAL DIAGNOSTIC N/A 05/11/2021    Procedure: ESOPHAGOGASTRODUODENOSCOPY (EGD);  Surgeon: David Cedeño MD;  Location: [facility];  Service: General    OK LAPS RPR PARAESPHGL HRNA INCL FUNDPLSTY W/MESH N/A 05/11/2021    Procedure: REPAIR HERNIA PARAESOPHAGEAL  LAPAROSCOPIC;  Surgeon: David Cedeño MD;  Location: [facility];  Service: General    OK UNLISTED PROCEDURE ESOPHAGUS N/A 05/11/2021    Procedure: FUNDOPLICATION TRANSORAL INCISIONLESS;  Surgeon: Brad Cadet MD;  Location: [facility];  Service: Gastroenterology    RHINOPLASTY      SINUS SURGERY      TOE AMPUTATION Left     2023 4th toe    TRANSORAL INCISIONLESS FUNDOPLICATION (TIF)  05/11/2021       Summary   GI provided clearance for PO trials for swallow evaluation as they will proceed with EGD tomorrow.  Pt states her dentures have been irritating her gums so she has not been wearing them.  She also has been finely chopping foods in a / to avoid regurgitation.  She is high risk for reflux aspiration.  Maximal support provided.  The pt desires a puree diet at this time in attempts of decreasing reflux/regurgitation of foods.  The results and recommendations were reviewed with the pt, Nurse, and PA. Will follow.     Recommended Diet: puree/level 1 diet and thin liquids - The pt is preferring at this time.   Recommended Form of Meds: whole with liquid and whole with puree - The pt states that large pills \"get stuck\".  She may benefit from larger pills whole in puree.   Aspiration precautions and swallowing strategies: upright posture, slow rate of feeding, small bites/sips, and alternating bites and sips  Other Recommendations: Continue frequent oral care at minimum twice daily.     Current Medical Status  Chart reports, Pt is a 64 y.o. female who presented to [facility] with past medical history of hypertension, " "hyperlipidemia, GERD, severe gastroparesis, hiatal hernia s/p TIF in 2021, GERD with esophagitis, Schreiber's esophagus  presenting to ED for evaluation of shortness of breath. Patient reports chronic cough for several months which has worsened over the last 3 days. Patient reports cough now productive of sputum. Also reports last night began vomiting. GI following.     Current Precautions:  Fall  Aspiration    Allergies:  No known food allergies    Past medical history:  Please see H&P for details    Special Studies:  CT Chest 12/23: IMPRESSION:   Faint patchy consolidations throughout the lungs, as described, which may be secondary to aspiration or multifocal pneumonia. Recommend 3-month follow-up noncontrast CT of the chest to assess stability/resolution.   Similar appearance of narrowing of the distal esophagus just proximal to the GE junction. Fluid throughout the esophagus. Recommend endoscopy if not recently performed to assess for the possibility of underlying lesion versus stricture.    Social/Education/Vocational Hx:  Unknown.  Pts plan in September was to move to [facility] apartments.     Swallow Information   Current Risks for Dysphagia & Aspiration: known history of dysphagia- regurgitation  Current Symptoms/Concerns:  vomiting  Current Diet: NPO for GI work up   Baseline Diet: thin liquids and finely chopped. The pt is known to [facility] from previous admissions.  She has typically been on a Dysphagia 3 and thin liquid diet.  She states that she has been chopping the foods more finely since our last evaluation 9/2024 to avoid regurgitation.  She states that when large pills stick she \"drinks a lot of liquid\" to get it down.  She last vomited yesterday.  Her dentures have been irritating her gums so she has not been wearing them.     Baseline Assessment   Behavior/Cognition: alert  Speech/Language Status: able to participate in conversation and able to follow commands  Patient " "Positioning: upright in bed  Pain Status/Interventions/Response to Interventions: No report of or nonverbal indications of pain.       Swallow Mechanism Exam  Facial: symmetrical  Labial: WFL  Lingual: WFL  Velum: symmetrical  Mandible: adequate ROM  Dentition: edentulous- as mentioned the pt has been having discomfort with dentures.   Vocal quality:clear/adequate   Volitional Cough: strong/productive   Respiratory Status: O2 NC 2L    Consistencies Assessed and Performance   Consistencies Administered: 1oz applesauce, 1oz nectar via cup, 3oz thins via cup and straw, 1/4 wendy doone, 1/2 saltine cracker.      Oral Stage:   The pt had difficulty with masticating.  She stated the wendy doone cookie piece was \"too hard\" and spit it out.  She was able to breakdown a saltine with edentulous state but spit it out after coughing due to reports that she \"had difficulty getting it down\"  Bolus formation and transfer were functional.  No overt s/s reduced oral control.    Pharyngeal Stage:   Swallowing initiation appeared prompt.  Laryngeal rise was palpated and judged to be within functional limits.  Cough occurred after cracker only.  The pt denies it was related to a liquid wash.  She feels the cracker stuck.  She is interested in pureed foods only for now.     Esophageal Concerns:  The pt reported she could not \"get the cracker down\".  She appeared to indicate it felt like it was sticking in her lower throat so she coughed and brought it back up/expectorated.  The pt has a long history of GI issues.  Severe gastroparesis, hiatal hernia, vomiting, GERD, and Schreiber's esophagus    Strategies and Efficacy: The pt typically takes her time and finely chops items.     Summary and Recommendations (see above)    Results Reviewed with: patient, RN, and PA     Treatment Recommended: Yes, dysphagia- most appropriate diet modification     Frequency of treatment: 1-2x/week    Patient Stated Goal:  To decrease regurgitation/vomiting. "     Dysphagia LTG  -Patient will demonstrate safe and effective oral intake (without overt s/s significant oral/pharyngeal dysphagia including s/s penetration or aspiration) for the highest appropriate diet level.     Short Term Goals:  -Pt will tolerate Dysphagia 1/pureed diet and thin liquid with no significant s/s oral or pharyngeal dysphagia across 1-3 diagnostic session/s    -Patient will tolerate trials of upgraded food and/or liquid texture with no significant s/s of oral or pharyngeal dysphagia including aspiration across 1-3 diagnostic sessions         Speech Therapy Prognosis   Prognosis: good    Prognosis Considerations: medical status and prior medical history

## 2024-12-23 NOTE — H&P
H&P - Hospitalist   Name: Irene Plascencia 64 y.o. female I MRN: [id number]  Unit/Bed#: -01 I Date of Admission: 12/23/2024   Date of Service: 12/23/2024 I Hospital Day: 0     Assessment & Plan  Acute hypoxic respiratory failure (HCC)  Patient is not on home 02, requiring 4L today  Likely due to pneumonia/pneumonitis.   No history of COPD or asthma  Continue with oxygen therapy at all times  Pulmonary consulted  Multifocal pneumonia  Pneumonia noted on CT vs aspiration pneumonitis  Patient is afebrile, w/o leukocytosis, normal procalcitonin  RSV/COVID/influenza is negative  Leaning towards pneumonitis however patient is toxic appearing  Will continue with zosyn for now, de-escalate pending cultures  Check mrsa swab to guide continuation of Vanco   Send sputum culture, gram stain, urine legionella/strep  Follow up blood cultures sent  Continue albuterol prn sob  Continue with antitussives as needed  Tylenol prn fever  NPO until SLP eval     Erosive esophagitis  Chronic, associated with barrets esophagus  Patient scheduled for outpatient EGD  Will get GI input as aspiration seems to be the primary reason for respiratory failure at this time   Consult placed to GI  Will also consult pulmonology as patient has high requirement of 02 and will need optimization  Continue with protonix BID, switch to IV BID due to dysphagia  SLP eval pending   Chronic hyponatremia  Thought to be due to psych meds  Received 2L IVF prior to admission  Will repeat sodium level to guide further management  Hold further fluids for now    Hyperlipidemia  Continue with statin  Primary hypertension  BP was low on presentation   Hold amlodipine at this time   Schizoaffective disorder (HCC)  Continue home meds   Lactic acidosis  S/p IVF   Trend till normal      VTE Pharmacologic Prophylaxis:   Moderate Risk (Score 3-4) - Pharmacological DVT Prophylaxis Ordered: enoxaparin (Lovenox).  Code Status: Level 1 - Full Code   Discussion with  "family: Patient declined call to .     Anticipated Length of Stay: Patient will be admitted on an inpatient basis with an anticipated length of stay of greater than 2 midnights secondary to above.    History of Present Illness   Chief Complaint: Cough, nausea    Irene Plascencia is a 64 y.o. female with a PMH of chronic dysphagia, severe gastroparesis, hypertension, schizoaffective disorder, esophagitis, hiatal hernia, chronic iron deficiency anemia presenting with complaints of cough and generalized malaise for 2 days.  She was in her usual state of health up until 2 days ago when she developed a productive cough.  Cough is productive of yellowish to greenish sputum and has now become constant over the last 2 days.  It is associated with shortness of breath, generalized weakness.  Denies any hematemesis.  Patient was coughing so much today she could not breathe and therefore called the ambulance. She denies any fevers, chills. She endorses right sided chest pain, sharp, worse with breathing. She did not take anything for her symptoms. She was prescribed tessalon pearls but patient does not take due to insurance issues. She denies tobacco or illicit drug use.   In ER,  Temp 97.6F, /68, 02 sat 97% on 4L 02.  Labs notable for Na 127, Lactic acid 2.8 > 3.3, procalcitonin < 0.05, wbc 10.11  Hemoglobin 10.7    CT chest \"Faint patchy consolidations throughout the lungs, as described, which may be secondary to aspiration or multifocal pneumonia. Recommend 3-month follow-up noncontrast CT of the chest to assess stability/resolution.     Similar appearance of narrowing of the distal esophagus just proximal to the GE junction. Fluid throughout the esophagus. Recommend endoscopy if not recently performed to assess for the possibility of underlying lesion versus stricture\"    Patient received IVF, Vanc, zosyn    Review of Systems   Constitutional:  Positive for fatigue. Negative for chills and " fever.   HENT:  Positive for trouble swallowing. Negative for ear pain, sneezing and sore throat.    Eyes:  Negative for pain and visual disturbance.   Respiratory:  Positive for cough, shortness of breath and wheezing.    Cardiovascular:  Negative for chest pain and palpitations.   Gastrointestinal:  Positive for nausea. Negative for abdominal distention, abdominal pain, diarrhea and vomiting.   Endocrine: Negative for polyuria.   Genitourinary:  Negative for dysuria and hematuria.   Musculoskeletal:  Negative for arthralgias and back pain.   Skin:  Negative for color change and rash.   Neurological:  Positive for weakness and light-headedness. Negative for dizziness, tremors, seizures and syncope.   Psychiatric/Behavioral:  Positive for sleep disturbance.    All other systems reviewed and are negative.      Historical Information   Past Medical History:   Diagnosis Date    Anemia     Anxiety     Arthritis     Back pain at L4-L5 level     Schreiber esophagus     Bulimia     Colon polyp     Disease of thyroid gland     hypothyroid    GERD (gastroesophageal reflux disease)     Hearing loss in left ear     History of transfusion     Hyperlipidemia     Hypertension     Hypothyroidism     Leukopenia     NMS (neuroleptic malignant syndrome) 01/03/2020    Pneumonia     RA (rheumatoid arthritis) (HCC)     in feet    Sciatica     Seizure (HCC)     due to medication mix up 8/2018 only one historically    Skin spots, red     lower right arm - poss from cat- no s/s infection    Synovial cyst of lumbar spine     Vertigo     Wears dentures     full set    Weight gain      Past Surgical History:   Procedure Laterality Date    BONE MARROW BIOPSY      BREAST SURGERY      enlargement with implants    CLOSED REDUCTION DISTAL FEMUR FRACTURE      COLONOSCOPY      COSMETIC SURGERY      DENTAL SURGERY      HIP ARTHROPLASTY Right 01/02/2020    Procedure: REVISION TOTAL HIP ARTHROPLASTY WITH REPAIR OF PARIPROSTHETIC FRACTURE - POSTERIOR  APPROACH;  Surgeon: Dilan Pedersen MD;  Location: [facility];  Service: Orthopedics    FL AMPUTATION METATARSAL W/TOE SINGLE Left 04/07/2023    Procedure: AMPUTATION TOE 4th;  Surgeon: Conner Bartlett DPM;  Location:  MAIN OR;  Service: Podiatry    FL ARTHRP ACETBLR/PROX FEM PROSTC AGRFT/ALGRFT Right 12/11/2019    Procedure: ARTHROPLASTY HIP TOTAL ANTERIOR;  Surgeon: Dilan Pedersen MD;  Location: [facility];  Service: Orthopedics    FL ESOPHAGOGASTRODUODENOSCOPY TRANSORAL DIAGNOSTIC N/A 05/11/2021    Procedure: ESOPHAGOGASTRODUODENOSCOPY (EGD);  Surgeon: David Cedeño MD;  Location: [facility];  Service: General    FL LAPS RPR PARAESPHGL HRNA INCL FUNDPLSTY W/MESH N/A 05/11/2021    Procedure: REPAIR HERNIA PARAESOPHAGEAL  LAPAROSCOPIC;  Surgeon: David Cedeño MD;  Location: [facility];  Service: General    FL UNLISTED PROCEDURE ESOPHAGUS N/A 05/11/2021    Procedure: FUNDOPLICATION TRANSORAL INCISIONLESS;  Surgeon: Brad Cadet MD;  Location: [facility];  Service: Gastroenterology    RHINOPLASTY      SINUS SURGERY      TOE AMPUTATION Left     2023 4th toe    TRANSORAL INCISIONLESS FUNDOPLICATION (TIF)  05/11/2021     Social History     Tobacco Use    Smoking status: Never     Passive exposure: Past    Smokeless tobacco: Never   Vaping Use    Vaping status: Never Used   Substance and Sexual Activity    Alcohol use: Not Currently    Drug use: Not Currently    Sexual activity: Not Currently     E-Cigarette/Vaping    E-Cigarette Use Never User      E-Cigarette/Vaping Substances    Nicotine No     THC No     CBD No     Flavoring No     Other No     Unknown No      Family History   Problem Relation Age of Onset    Uterine cancer Mother     Esophageal cancer Father     Colon cancer Maternal Grandmother      Social History:  Marital Status: Single   Meds/Allergies   I have reviewed home medications with patient personally.  Prior to Admission medications    Medication Sig Start Date End Date Taking? Authorizing  Provider   buPROPion (WELLBUTRIN) 75 mg tablet Take 75 mg by mouth 12/22/24  Yes Historical Provider, MD   amLODIPine (NORVASC) 5 mg tablet TAKE 1 TABLET (5 MG TOTAL) BY MOUTH DAILY. 2/3/24   Raheem Cain MD   benzonatate (TESSALON) 200 MG capsule Take 1 capsule (200 mg total) by mouth 3 (three) times a day as needed for cough 12/16/24   Raheem Cain MD   celecoxib (CeleBREX) 200 mg capsule Take 200 mg by mouth daily 10/18/24   Historical Provider, MD   levothyroxine 25 mcg tablet TAKE 1 TABLET BY MOUTH EVERY DAY 2/3/24   Raheem Cain MD   loratadine (CLARITIN) 10 mg tablet Take 1 tablet (10 mg total) by mouth daily 11/22/24   Jonny Villa MD   LORazepam (ATIVAN) 2 mg tablet Take 1 tablet (2 mg total) by mouth every 6 (six) hours as needed for anxiety Max 5 times a day 12/16/24   Raheem Cain MD   metoclopramide (REGLAN) 5 mg tablet TAKE 1 TABLET BY MOUTH 3 TIMES A DAY BEFORE MEALS. 11/26/24   ABENA Almazan   ondansetron (ZOFRAN) 4 mg tablet Take 1 tablet (4 mg total) by mouth every 8 (eight) hours as needed for nausea or vomiting 11/21/24   Raheem Cain MD   pantoprazole (PROTONIX) 40 mg tablet TAKE 1 TABLET BY MOUTH TWICE A DAY 7/7/24   Raheem Cain MD   PARoxetine (PAXIL) 30 mg tablet TAKE 2 TABLETS (60 MG TOTAL) BY MOUTH IN THE MORNING 9/21/24   Raheem Cain MD   QUEtiapine (SEROquel XR) 400 mg 24 hr tablet TAKE 1 TABLET (400 MG TOTAL) BY MOUTH DAILY AT BEDTIME 5/9/24   Raheem Cain MD   sucralfate (CARAFATE) 1 g tablet TAKE 1 TABLET (1 G TOTAL) BY MOUTH 3 (THREE) TIMES A DAY WITH MEALS 9/21/24   Raheem Cain MD   Semaglutide-Weight Management (Wegovy) 0.25 MG/0.5ML Inject 0.25 mg under the skin weekly 11/8/24 12/23/24  Raheem Cain MD     Allergies   Allergen Reactions    Latex Anaphylaxis, Rash and Tongue Swelling     Band aids, adhesives wears normal underwear, can eat bananas  USE PAPER TAPE    Risperdal [Risperidone] Shortness Of Breath     Rapid heart beat, SOB    Zyprexa  [Olanzapine] Shortness Of Breath     Rapid heartbeat       Objective :  Temp:  [97.6 °F (36.4 °C)-99 °F (37.2 °C)] 99 °F (37.2 °C)  HR:  [] 109  BP: (111-120)/(63-79) 120/79  Resp:  [22] 22  SpO2:  [96 %-100 %] 96 %  O2 Device: Nasal cannula  Nasal Cannula O2 Flow Rate (L/min):  [4 L/min] 4 L/min    Physical Exam  Vitals and nursing note reviewed.   Constitutional:       General: She is not in acute distress.     Appearance: She is well-developed. She is ill-appearing and toxic-appearing. She is not diaphoretic.   HENT:      Head: Normocephalic and atraumatic.      Mouth/Throat:      Mouth: Mucous membranes are moist.   Eyes:      General: No scleral icterus.     Conjunctiva/sclera: Conjunctivae normal.   Cardiovascular:      Rate and Rhythm: Normal rate and regular rhythm.      Heart sounds: No murmur heard.  Pulmonary:      Effort: Pulmonary effort is normal. No respiratory distress.      Breath sounds: Rhonchi present. No wheezing or rales.   Abdominal:      General: There is no distension.      Palpations: Abdomen is soft. There is no mass.      Tenderness: There is no abdominal tenderness. There is no guarding.   Musculoskeletal:         General: No swelling.      Cervical back: Neck supple.   Skin:     General: Skin is warm and dry.      Capillary Refill: Capillary refill takes less than 2 seconds.      Findings: No rash.   Neurological:      General: No focal deficit present.      Mental Status: Mental status is at baseline. She is lethargic.      Motor: No weakness.   Psychiatric:         Mood and Affect: Mood normal.       Lines/Drains:        Lab Results: I have reviewed the following results:  Results from last 7 days   Lab Units 12/23/24  0159   WBC Thousand/uL 10.11   HEMOGLOBIN g/dL 10.7*   HEMATOCRIT % 33.2*   PLATELETS Thousands/uL 215   LYMPHO PCT % 2*   MONO PCT % 4   EOS PCT % 0     Results from last 7 days   Lab Units 12/23/24  0159   SODIUM mmol/L 127*   POTASSIUM mmol/L 3.6   CHLORIDE  mmol/L 93*   CO2 mmol/L 23   BUN mg/dL 11   CREATININE mg/dL 0.71   ANION GAP mmol/L 11   CALCIUM mg/dL 8.3*   ALBUMIN g/dL 3.7   TOTAL BILIRUBIN mg/dL 0.24   ALK PHOS U/L 66   ALT U/L 12   AST U/L 19   GLUCOSE RANDOM mg/dL 137     Results from last 7 days   Lab Units 12/23/24  0159   INR  0.94         Lab Results   Component Value Date    HGBA1C 5.0 05/13/2021    HGBA1C 4.5 12/04/2019    HGBA1C 4.9 11/14/2019     Results from last 7 days   Lab Units 12/23/24  0422 12/23/24  0159   LACTIC ACID mmol/L 3.3* 2.8*   PROCALCITONIN ng/ml  --  <0.05       Imaging Results Review: I reviewed radiology reports from this admission including: CT chest.  Other Study Results Review: EKG was reviewed.     Administrative Statements   I have spent a total time of 65 minutes in caring for this patient on the day of the visit/encounter including Instructions for management.    ** Please Note: This note has been constructed using a voice recognition system. **

## 2024-12-23 NOTE — CONSULTS
"Consultation - Pulmonology   Name: Irene Plascencia 64 y.o. female I MRN: [id number]  Unit/Bed#: -01 I Date of Admission: 12/23/2024   Date of Service: 12/23/2024 I Hospital Day: 0   Consults  Physician Requesting Evaluation: Kole Batista MD   Reason for Evaluation / Principal Problem: Multifocal pneumonia on chest CT, with new oxygen requirement of 4 L.    Assessment & Plan  Acute hypoxic respiratory failure (HCC)  Presented to ER with c/o productive cough (duration unclear), new onset SOB, generalized weakness and malaise. Additionally with R sided pleuritic CP.   Ddx: Pneumonitis versus pneumonia  New O2 requirement of up to 4 L  Titrate as needed to keep SpO2 greater 92%  CT CAP w contrast showed \"Faint patchy consolidations throughout the lungs, as described, which may be secondary to aspiration or multifocal pneumonia. Recommend 3-month follow-up noncontrast CT of the chest to assess stability/resolution\"  Started on IV Zosyn, initial procal negative.   Repeat chest x-ray tomorrow, consider CT chest if needed  Multifocal pneumonia  Presented to ER with c/o productive cough (duration unclear), new onset SOB, generalized weakness and malaise. Additionally with R sided pleuritic CP.  Ddx: Pneumonia versus aspiration pneumonitis/pneumonia  Patient is afebrile, w/o leukocytosis, normal procalcitonin  RSV/COVID/influenza is negative  Leaning towards pneumonitis the patient has episodes of similar admissions for same complaint going back to August   Pending cultures and serial procal levels  F/u sputum culture, gram stain, urine legionella/strep  Follow up blood cultures from admission  Supportive care with nebs and anti-tussives  NP until speech eval however also NPO for possible GI procedure     Chronic hyponatremia  Per primary team  Schizoaffective disorder (HCC)  Per primary team  Primary hypertension  Per primary team  Erosive esophagitis  Per GI and primary team  Hyperlipidemia  Per primary " "team  Gastroparesis  Per GI and primary team  Nausea and vomiting  Per GI and primary team  Gastroesophageal reflux disease with esophagitis  Per GI and primary team      History of Present Illness   Irene Plascencia is a 64 y.o. female who presents with cough, weakness, general malaise for the past several days.  Past medical history of chronic dysphagia, hiatal hernia, iron deficiency anemia, gastroparesis, hyperlipidemia, hypertension, erosive esophagitis, lactic acidosis, chronic hyponatremia, and schizoaffective disorder.  She denies any pulmonary history or home O2 use.  Patient is a limited historian.  She reports that the cough started between 2 days, and 2 weeks ago, and also later mentioned that she has been having a productive cough since August.  Patient reports that cough is productive with greenish/yellowish sputum, and without blood streaking or hemoptysis.  She also reports new onset shortness of breath, worse with exertion, generalized weakness and malaise.  She reports calling 911 since she \"could not breathe\".  She denies any fevers, night sweats, or unintentional weight loss however she does endorse chills for the past several weeks.  Patient also endorses right-sided chest pain which is sharp in characteristic and worse with deep inspiration, most likely pleuritic in nature.  Patient denies tobacco, alcohol, or illicit substance use.    Patient was laying comfortably in bed heart rate 104, respirations 19, blood pressure 115/65, SpO2 96 to 98% on 3 L nasal cannula.  24-hour Tmax is 99 °F.  BMP and CMP reveal a sodium of 127 and 131 respectively, potassium 4.1, calcium 8.1, albumin 3.7, corrected calcium is 8.5.  Troponins negative, lactic acid 2.8 => 3.3 => 2.9, procalcitonin less than 0.05.  CBC shows WBC 10.11, hemoglobin 10.7, hematocrit 33.2, platelets 215.  Influenza A/B and COVID tests are negative.    Review of Systems   Constitutional:  Positive for chills. Negative for activity " change, appetite change, diaphoresis, fatigue, fever and unexpected weight change.   HENT:  Positive for congestion. Negative for ear discharge, ear pain, hearing loss, mouth sores, nosebleeds, postnasal drip, rhinorrhea and sinus pain.    Eyes: Negative.    Respiratory:  Positive for cough and shortness of breath. Negative for apnea, choking, chest tightness, wheezing and stridor.    Cardiovascular: Negative.    Gastrointestinal: Negative.    Endocrine: Negative.    Genitourinary: Negative.    Musculoskeletal: Negative.    Skin: Negative.    Allergic/Immunologic: Negative.    Neurological: Negative.    Hematological: Negative.    Psychiatric/Behavioral: Negative.         Historical Information   I have reviewed the patient's PMH, PSH, Social History, Family History, Meds, and Allergies  Tobacco History: Denies  Family History:non-contributory    Objective :  Temp:  [97.6 °F (36.4 °C)-99 °F (37.2 °C)] 99 °F (37.2 °C)  HR:  [] 106  BP: (111-120)/(63-79) 120/79  Resp:  [22-23] 23  SpO2:  [96 %-100 %] 97 %  O2 Device: Nasal cannula  Nasal Cannula O2 Flow Rate (L/min):  [2 L/min-4 L/min] 2 L/min    Physical Exam  Vitals reviewed.   Constitutional:       General: She is not in acute distress.     Appearance: She is normal weight. She is not ill-appearing or toxic-appearing.      Interventions: She is not intubated.  HENT:      Head: Normocephalic and atraumatic.   Eyes:      Pupils: Pupils are equal, round, and reactive to light.   Cardiovascular:      Rate and Rhythm: Normal rate and regular rhythm.      Heart sounds: No murmur heard.  Pulmonary:      Effort: Pulmonary effort is normal. No tachypnea, bradypnea, accessory muscle usage or respiratory distress. She is not intubated.      Breath sounds: No stridor. Examination of the right-lower field reveals decreased breath sounds. Examination of the left-lower field reveals decreased breath sounds. Decreased breath sounds present.   Chest:      Chest wall: No mass,  deformity, tenderness, crepitus or edema. There is no dullness to percussion.   Skin:     General: Skin is warm and dry.      Capillary Refill: Capillary refill takes less than 2 seconds.   Neurological:      Mental Status: She is alert.         Lab Results: I have reviewed the following results:  .     12/23/24  0159 12/23/24  0422 12/23/24  0643 12/23/24  1048   WBC 10.11  --   --   --    HGB 10.7*  --  11.2*  --    HCT 33.2*  --  35.5  --      --   --   --    SODIUM 127*  --  131*  --    K 3.6  --  4.1  --    CL 93*  --  100  --    CO2 23  --  21  --    BUN 11  --  9  --    CREATININE 0.71  --  0.71  --    GLUC 137  --  139  --    AST 19  --   --   --    ALT 12  --   --   --    ALB 3.7  --   --   --    TBILI 0.24  --   --   --    ALKPHOS 66  --   --   --    PTT 28  --   --   --    INR 0.94  --   --   --    HSTNI0 <2  --   --   --    LACTICACID 2.8*   < > 2.9* 2.7*    < > = values in this interval not displayed.     ABG: No new results in last 24 hours.    Imaging Results Review: I reviewed radiology reports from this admission including: CT chest and CT abdomen/pelvis.  Other Study Results Review: No additional pertinent studies reviewed.  PFT Results Reviewed: None    VTE Prophylaxis: Enoxaparin (Lovenox)

## 2024-12-23 NOTE — ASSESSMENT & PLAN NOTE
Pneumonia noted on CT vs aspiration pneumonitis  Patient is afebrile, w/o leukocytosis, normal procalcitonin  RSV/COVID/influenza is negative  Leaning towards pneumonitis however patient toxic appearing on admission thus started on IV Zosyn  Continue for now, de-escalate pending cultures and serial procal levels  F/u sputum culture, gram stain, urine legionella/strep  Follow up blood cultures from admission  Supportive care with nebs and anti-tussives  NP until speech eval however also NPO for possible GI procedure

## 2024-12-23 NOTE — ASSESSMENT & PLAN NOTE
Chronic, attributed to psych meds in past per note review  Currently improved from admission  Monitor BMP

## 2024-12-23 NOTE — ED ATTENDING ATTESTATION
12/23/2024  I, Micha Swift MD, saw and evaluated the patient. I have discussed the patient with the resident/non-physician practitioner and agree with the resident's/non-physician practitioner's findings, Plan of Care, and MDM as documented in the resident's/non-physician practitioner's note, except where noted. All available labs and Radiology studies were reviewed.  I was present for key portions of any procedure(s) performed by the resident/non-physician practitioner and I was immediately available to provide assistance.       At this point I agree with the current assessment done in the Emergency Department.  I have conducted an independent evaluation of this patient a history and physical is as follows:    64-year-old woman presenting with fever, cough, new oxygen requirement.  Has had worsening cough over the past couple of days with difficulty breathing tonight.  Is currently having chest pressure.  Is also complaining of nausea.  On exam patient is ill-appearing.  She has increased work of breathing with bibasilar Rales.  Heart tachycardic, regular, no murmurs rubs or gallops.  Abdomen soft, nontender, nondistended.  On arrival patient was hypoxic and started on supplemental oxygen.  She does not wear oxygen at home.  EKG, labs and imaging done.  Broad-spectrum antibiotics started.        ED Course         Critical Care Time  CriticalCare Time    Date/Time: 12/23/2024 3:53 AM    Performed by: Micha Swift MD  Authorized by: Micha Swift MD    Critical care provider statement:     Critical care time (minutes):  32    Critical care time was exclusive of:  Separately billable procedures and treating other patients and teaching time    Critical care was necessary to treat or prevent imminent or life-threatening deterioration of the following conditions:  Respiratory failure and sepsis    Critical care was time spent personally by me on the following activities:  Obtaining history from patient or  surrogate, development of treatment plan with patient or surrogate, evaluation of patient's response to treatment, examination of patient, interpretation of cardiac output measurements, ordering and performing treatments and interventions, ordering and review of laboratory studies, ordering and review of radiographic studies, re-evaluation of patient's condition and review of old charts

## 2024-12-23 NOTE — CONSULTS
Consultation - Gastroenterology   Name: Irene Plascencia 64 y.o. female I MRN: [id number]  Unit/Bed#: -01 I Date of Admission: 12/23/2024   Date of Service: 12/23/2024 I Hospital Day: 0       Inpatient consult to gastroenterology     Date/Time  12/23/2024 8:45 AM     Performed by  Stefani Flynn MD   Authorized by  Fela Perez MD           Physician Requesting Evaluation: Kole Batista MD   Reason for Evaluation / Principal Problem: History of gastroparesis, esophagitis    Assessment & Plan  Nausea and vomiting  Briefly, patient is a 64-year-old female with a history of severe gastroparesis, GERD, hiatal hernia s/p cTIF in 2021, previous rosas's esophagus who presented with progressive malaise, shortness of breath, cough.  She also reports slightly worsening nausea and vomiting over the last few weeks and was found to have concerns for multiple/aspiration pneumonia on CT chest.  CT abdomen and pelvis showed possible inflammation thickening in the distal esophagus with narrowing and fluid throughout the more proximal esophagus.  Also finding of small hiatal hernia    Differential diagnosis include viral gastroenteritis, worsening gastroparesis,  worsening esophagitis, distal esophageal peptic stricture, structural/functional issue with TIF    Plan  - Agree SLP evaluation today. Okay for diet today if tolerated  - EGD tomorrow. NPO at midnight  - Resume home dose Reglan 5 mg p.o. 3 times daily  - Continue PPI BID  Gastroparesis  See plan above  Gastroesophageal reflux disease with esophagitis  See plan above      History of Present Illness   HPI:  Irene Plascencia is a 64 y.o. female with PMH of severe gastroparesis, GERD with esophagitis, hiatal hernia s/p cTIF in 2021,  Rosas's esophagus presented with c/o malaise, shortness of breath and worsening cough over the last few days.    She also reports increase nausea and spitting up small amounts of emesis over the last few weeks.  She  reports being compliant with her Reglan and Protonix.  She denies any fever, chills, odynophagia, dysphagia, diarrhea or constipation.        Review of Systems  See HPI    Objective :  Temp:  [97.6 °F (36.4 °C)-99 °F (37.2 °C)] 99 °F (37.2 °C)  HR:  [] 106  BP: (111-120)/(63-79) 120/79  Resp:  [22-23] 23  SpO2:  [96 %-100 %] 97 %  O2 Device: Nasal cannula  Nasal Cannula O2 Flow Rate (L/min):  [2 L/min-4 L/min] 2 L/min    Physical Exam  Vitals and nursing note reviewed.   Constitutional:       General: She is not in acute distress.     Appearance: She is well-developed.   HENT:      Head: Normocephalic and atraumatic.   Eyes:      Conjunctiva/sclera: Conjunctivae normal.   Cardiovascular:      Rate and Rhythm: Normal rate and regular rhythm.      Heart sounds: No murmur heard.  Pulmonary:      Effort: Pulmonary effort is normal. No respiratory distress.      Breath sounds: Normal breath sounds.   Abdominal:      Palpations: Abdomen is soft.      Tenderness: There is no abdominal tenderness.   Musculoskeletal:         General: No swelling.      Cervical back: Neck supple.   Skin:     General: Skin is warm and dry.      Capillary Refill: Capillary refill takes less than 2 seconds.   Neurological:      Mental Status: She is alert.   Psychiatric:         Mood and Affect: Mood normal.           Lab Results: I have reviewed the following results:CBC/BMP:   .     12/23/24  0159 12/23/24  0643   WBC 10.11  --    HGB 10.7* 11.2*   HCT 33.2* 35.5     --    SODIUM 127* 131*   K 3.6 4.1   CL 93* 100   CO2 23 21   BUN 11 9   CREATININE 0.71 0.71   GLUC 137 139        Imaging Results Review: I reviewed radiology reports from this admission including: CT abdomen/pelvis.  Other Study Results Review: No additional pertinent studies reviewed.    Stefani Flynn MD  Gastroenterology Fellow, PGY- 4  Available on EPIC Secure Chat  12/23/2024 2:05 PM clinic

## 2024-12-23 NOTE — PLAN OF CARE
Problem: INFECTION - ADULT  Goal: Absence or prevention of progression during hospitalization  Description: INTERVENTIONS:  - Assess and monitor for signs and symptoms of infection  - Monitor lab/diagnostic results  - Monitor all insertion sites, i.e. indwelling lines, tubes, and drains  - Monitor endotracheal if appropriate and nasal secretions for changes in amount and color  - [place] appropriate cooling/warming therapies per order  - Administer medications as ordered  - Instruct and encourage patient and family to use good hand hygiene technique  - Identify and instruct in appropriate isolation precautions for identified infection/condition  Outcome: Progressing

## 2024-12-23 NOTE — ASSESSMENT & PLAN NOTE
[facility] Dermatology Note      Dermatology Problem List:  1. Solar lentigines, seborrheic keratoses on face  2. Banal-appearing melanocytic nevi on the trunk    CC:   Chief Complaint   Patient presents with     Derm Problem     Marium is here today for a total body skin exam, patient has concern for spots on her face and brittle nails.          Encounter Date: Sep 14, 2018    History of Present Illness:  Ms. Marium Moss is a 39 year old female who presents for evaluation of several skin lesions of concern.     Two moles on back   - longstanding   - one raised - sometimes itchy, bleeds with trauma   - not otherwise symptomatic    Face - several dark brown spots on the face   - upper lip, sides of face, cheeks   - getting bigger and darker   - previously evaluated and diagnosed as solar lentigines   - mother and grandmother have similar-appearing spots    Nails - brittle, splitting, no pain or other symptoms   - gradual onset over last several years      Past Medical History:   Patient Active Problem List   Diagnosis     CARDIOVASCULAR SCREENING; LDL GOAL LESS THAN 160     Bipolar disorder (manic depression) (H)     Past Medical History:   Diagnosis Date     Bipolar affective disorder (H)      Depressive disorder, not elsewhere classified     sees therapist - Mary Ceja for med mgmt     Skin disease      Past Surgical History:   Procedure Laterality Date     NO HISTORY OF SURGERY         Social History:   reports that she has never smoked. She has never used smokeless tobacco. She reports that she drinks alcohol. She reports that she uses illicit drugs, including Marijuana.   Alcohol - 1-2 drinks per week    Family History:  Family History   Problem Relation Age of Onset     Diabetes Maternal Grandmother      Hypertension Maternal Grandmother      Cerebrovascular Disease Maternal Grandmother      Thyroid Disease Maternal Grandmother      Skin Cancer Maternal Grandmother      Depression Mother   Patient is not on home 02, requiring 4L today  Likely due to pneumonia/pneumonitis.   No history of COPD or asthma  Continue with oxygen therapy at all times  Pulmonary consulted       Lipids Mother      Skin Cancer Mother      Depression       self     GASTROINTESTINAL DISEASE Father      ulcer     GASTROINTESTINAL DISEASE       self   Mother did not have skin cancer - had actinic keratosis  Grandmother had NMSC    Medications:  Current Outpatient Prescriptions   Medication Sig Dispense Refill     CLONAZEPAM PO        drospirenone-ethinyl estradiol (ALBERTO) 3-0.03 MG per tablet Take 1 tablet by mouth At Bedtime 30 tablet      Allergies   Allergen Reactions     Penicillin [Penicillins] Rash         Review of Systems:  -Skin Establ Pt: The patient denies any new rash, pruritus, or lesions that are symptomatic, changing or bleeding, except as per HPI.  -Constitutional: The patient denies fatigue, fevers, chills, unintended weight loss, and night sweats.  -HEENT: Patient denies nonhealing oral sores.  -Skin: As above in HPI. No additional skin concerns.    Physical exam:  Vitals: /75 (BP Location: Left arm, Patient Position: Sitting, Cuff Size: Adult Regular)  Pulse 87  GEN: This is a well developed, well-nourished female in no acute distress, in a pleasant mood.    SKIN: Total skin excluding the undergarment areas was performed. The exam included the head/face, neck, both arms, chest, back, abdomen, both legs, digits and/or nails.   -Several thin stuck-on appearing brown papules on the central face  -Numerous tan evenly pigmented macules on the face, trunk, and extremities  -Two brown evenly pigmented papules on the central back  -Onychorrhexis of the thumbs > fifth fingernails. No subungual hyperkeratosis  -No other lesions of concern on areas examined.     Impression/Plan:  1. Facial seborrheic keratoses, solar lentigines, and ephelides    Reassurance provided for these benign, asymptomatic lesions not treated today. If cosmetic treatment is desired, recommend follow up with one of our cosmetic dermatologists.    2. Onychorrhexis    Discussed that this is a normal, age-related finding  that does not require further treatment. It can be associated with frequent soap/water exposure as well. There are certain types of rashes that can cause onychorrhexis which are absent in this patient. Can trial hair/skin/nails vitamin supplementation if desired, but discussed that there is not significant evidence to support use.    CC Dr. Gilda Hernandez on close of this encounter.  Follow-up prn       Staff Involved:  Staff Only    Edvin Garcia MD   of Dermatology  Department of Dermatology  [facility]

## 2024-12-23 NOTE — ASSESSMENT & PLAN NOTE
Thought to be due to psych meds  Received 2L IVF prior to admission  Will repeat sodium level to guide further management  Hold further fluids for now

## 2024-12-24 ENCOUNTER — APPOINTMENT (INPATIENT)
Dept: GASTROENTEROLOGY | Facility: HOSPITAL | Age: 64
DRG: 177 | End: 2024-12-24
Payer: COMMERCIAL

## 2024-12-24 ENCOUNTER — ANESTHESIA (INPATIENT)
Dept: GASTROENTEROLOGY | Facility: HOSPITAL | Age: 64
DRG: 177 | End: 2024-12-24
Payer: COMMERCIAL

## 2024-12-24 ENCOUNTER — ANESTHESIA EVENT (INPATIENT)
Dept: GASTROENTEROLOGY | Facility: HOSPITAL | Age: 64
DRG: 177 | End: 2024-12-24
Payer: COMMERCIAL

## 2024-12-24 PROBLEM — R79.89 ELEVATED LACTIC ACID LEVEL: Status: ACTIVE | Noted: 2020-01-03

## 2024-12-24 LAB
ALBUMIN SERPL BCG-MCNC: 3.8 G/DL (ref 3.5–5)
ALP SERPL-CCNC: 60 U/L (ref 34–104)
ALT SERPL W P-5'-P-CCNC: 19 U/L (ref 7–52)
ANION GAP SERPL CALCULATED.3IONS-SCNC: 4 MMOL/L (ref 4–13)
AST SERPL W P-5'-P-CCNC: 18 U/L (ref 13–39)
BILIRUB SERPL-MCNC: 0.3 MG/DL (ref 0.2–1)
BUN SERPL-MCNC: 11 MG/DL (ref 5–25)
CALCIUM SERPL-MCNC: 9.2 MG/DL (ref 8.4–10.2)
CHLORIDE SERPL-SCNC: 100 MMOL/L (ref 96–108)
CO2 SERPL-SCNC: 30 MMOL/L (ref 21–32)
CREAT SERPL-MCNC: 0.61 MG/DL (ref 0.6–1.3)
ERYTHROCYTE [DISTWIDTH] IN BLOOD BY AUTOMATED COUNT: 14.3 % (ref 11.6–15.1)
GFR SERPL CREATININE-BSD FRML MDRD: 96 ML/MIN/1.73SQ M
GLUCOSE SERPL-MCNC: 98 MG/DL (ref 65–140)
HCT VFR BLD AUTO: 31.8 % (ref 34.8–46.1)
HGB BLD-MCNC: 10.4 G/DL (ref 11.5–15.4)
L PNEUMO1 AG UR QL IA.RAPID: NEGATIVE
MCH RBC QN AUTO: 27.6 PG (ref 26.8–34.3)
MCHC RBC AUTO-ENTMCNC: 32.7 G/DL (ref 31.4–37.4)
MCV RBC AUTO: 84 FL (ref 82–98)
MRSA NOSE QL CULT: NORMAL
PLATELET # BLD AUTO: 233 THOUSANDS/UL (ref 149–390)
PMV BLD AUTO: 8.6 FL (ref 8.9–12.7)
POTASSIUM SERPL-SCNC: 4 MMOL/L (ref 3.5–5.3)
PROCALCITONIN SERPL-MCNC: 0.2 NG/ML
PROT SERPL-MCNC: 5.9 G/DL (ref 6.4–8.4)
RBC # BLD AUTO: 3.77 MILLION/UL (ref 3.81–5.12)
S PNEUM AG UR QL: NEGATIVE
SODIUM SERPL-SCNC: 134 MMOL/L (ref 135–147)
WBC # BLD AUTO: 8.44 THOUSAND/UL (ref 4.31–10.16)

## 2024-12-24 PROCEDURE — 85027 COMPLETE CBC AUTOMATED: CPT | Performed by: INTERNAL MEDICINE

## 2024-12-24 PROCEDURE — 0DB38ZX EXCISION OF LOWER ESOPHAGUS, VIA NATURAL OR ARTIFICIAL OPENING ENDOSCOPIC, DIAGNOSTIC: ICD-10-PCS | Performed by: INTERNAL MEDICINE

## 2024-12-24 PROCEDURE — 84145 PROCALCITONIN (PCT): CPT | Performed by: INTERNAL MEDICINE

## 2024-12-24 PROCEDURE — 87205 SMEAR GRAM STAIN: CPT | Performed by: INTERNAL MEDICINE

## 2024-12-24 PROCEDURE — 94640 AIRWAY INHALATION TREATMENT: CPT

## 2024-12-24 PROCEDURE — 43239 EGD BIOPSY SINGLE/MULTIPLE: CPT | Performed by: INTERNAL MEDICINE

## 2024-12-24 PROCEDURE — 94760 N-INVAS EAR/PLS OXIMETRY 1: CPT

## 2024-12-24 PROCEDURE — 87449 NOS EACH ORGANISM AG IA: CPT | Performed by: INTERNAL MEDICINE

## 2024-12-24 PROCEDURE — 99232 SBSQ HOSP IP/OBS MODERATE 35: CPT | Performed by: INTERNAL MEDICINE

## 2024-12-24 PROCEDURE — 87070 CULTURE OTHR SPECIMN AEROBIC: CPT | Performed by: INTERNAL MEDICINE

## 2024-12-24 PROCEDURE — 88305 TISSUE EXAM BY PATHOLOGIST: CPT | Performed by: PATHOLOGY

## 2024-12-24 PROCEDURE — 80053 COMPREHEN METABOLIC PANEL: CPT | Performed by: INTERNAL MEDICINE

## 2024-12-24 PROCEDURE — 99233 SBSQ HOSP IP/OBS HIGH 50: CPT | Performed by: INTERNAL MEDICINE

## 2024-12-24 PROCEDURE — 87106 FUNGI IDENTIFICATION YEAST: CPT | Performed by: INTERNAL MEDICINE

## 2024-12-24 RX ORDER — LORAZEPAM 1 MG/1
2 TABLET ORAL EVERY 8 HOURS PRN
Status: DISCONTINUED | OUTPATIENT
Start: 2024-12-24 | End: 2024-12-26 | Stop reason: HOSPADM

## 2024-12-24 RX ORDER — PANTOPRAZOLE SODIUM 40 MG/1
40 TABLET, DELAYED RELEASE ORAL
Status: DISCONTINUED | OUTPATIENT
Start: 2024-12-25 | End: 2024-12-26 | Stop reason: HOSPADM

## 2024-12-24 RX ORDER — ZIPRASIDONE HYDROCHLORIDE 40 MG/1
80 CAPSULE ORAL
Status: DISCONTINUED | OUTPATIENT
Start: 2024-12-24 | End: 2024-12-26 | Stop reason: HOSPADM

## 2024-12-24 RX ORDER — AMLODIPINE BESYLATE 5 MG/1
5 TABLET ORAL DAILY
Status: DISCONTINUED | OUTPATIENT
Start: 2024-12-24 | End: 2024-12-26 | Stop reason: HOSPADM

## 2024-12-24 RX ORDER — PAROXETINE 20 MG/1
30 TABLET, FILM COATED ORAL DAILY
Status: DISCONTINUED | OUTPATIENT
Start: 2024-12-24 | End: 2024-12-26 | Stop reason: HOSPADM

## 2024-12-24 RX ORDER — PROPOFOL 10 MG/ML
INJECTION, EMULSION INTRAVENOUS AS NEEDED
Status: DISCONTINUED | OUTPATIENT
Start: 2024-12-24 | End: 2024-12-24

## 2024-12-24 RX ORDER — SODIUM CHLORIDE 9 MG/ML
INJECTION, SOLUTION INTRAVENOUS CONTINUOUS PRN
Status: DISCONTINUED | OUTPATIENT
Start: 2024-12-24 | End: 2024-12-24

## 2024-12-24 RX ORDER — LIDOCAINE HYDROCHLORIDE 20 MG/ML
INJECTION, SOLUTION EPIDURAL; INFILTRATION; INTRACAUDAL; PERINEURAL AS NEEDED
Status: DISCONTINUED | OUTPATIENT
Start: 2024-12-24 | End: 2024-12-24

## 2024-12-24 RX ORDER — METOCLOPRAMIDE 10 MG/1
5 TABLET ORAL
Status: DISCONTINUED | OUTPATIENT
Start: 2024-12-24 | End: 2024-12-26 | Stop reason: HOSPADM

## 2024-12-24 RX ADMIN — PAROXETINE HYDROCHLORIDE 30 MG: 20 TABLET, FILM COATED ORAL at 16:40

## 2024-12-24 RX ADMIN — QUETIAPINE FUMARATE 400 MG: 100 TABLET ORAL at 22:11

## 2024-12-24 RX ADMIN — LORAZEPAM 2 MG: 1 TABLET ORAL at 16:53

## 2024-12-24 RX ADMIN — METOCLOPRAMIDE 5 MG: 10 TABLET ORAL at 22:11

## 2024-12-24 RX ADMIN — SODIUM CHLORIDE: 9 INJECTION, SOLUTION INTRAVENOUS at 09:08

## 2024-12-24 RX ADMIN — LEVALBUTEROL HYDROCHLORIDE 1.25 MG: 1.25 SOLUTION RESPIRATORY (INHALATION) at 19:21

## 2024-12-24 RX ADMIN — ENOXAPARIN SODIUM 40 MG: 40 INJECTION SUBCUTANEOUS at 10:28

## 2024-12-24 RX ADMIN — ZIPRASIDONE HYDROCHLORIDE 80 MG: 40 CAPSULE ORAL at 16:39

## 2024-12-24 RX ADMIN — PROPOFOL 100 MG: 10 INJECTION, EMULSION INTRAVENOUS at 09:12

## 2024-12-24 RX ADMIN — LEVALBUTEROL HYDROCHLORIDE 1.25 MG: 1.25 SOLUTION RESPIRATORY (INHALATION) at 12:00

## 2024-12-24 RX ADMIN — PANTOPRAZOLE SODIUM 40 MG: 40 INJECTION, POWDER, FOR SOLUTION INTRAVENOUS at 08:36

## 2024-12-24 RX ADMIN — PANTOPRAZOLE SODIUM 40 MG: 40 INJECTION, POWDER, FOR SOLUTION INTRAVENOUS at 22:11

## 2024-12-24 RX ADMIN — METOCLOPRAMIDE 5 MG: 5 INJECTION, SOLUTION INTRAMUSCULAR; INTRAVENOUS at 16:40

## 2024-12-24 RX ADMIN — PROPOFOL 30 MG: 10 INJECTION, EMULSION INTRAVENOUS at 09:23

## 2024-12-24 RX ADMIN — PROPOFOL 50 MG: 10 INJECTION, EMULSION INTRAVENOUS at 09:19

## 2024-12-24 RX ADMIN — PREDNISONE 40 MG: 20 TABLET ORAL at 10:28

## 2024-12-24 RX ADMIN — PROPOFOL 50 MG: 10 INJECTION, EMULSION INTRAVENOUS at 09:16

## 2024-12-24 RX ADMIN — LIDOCAINE HYDROCHLORIDE 100 MG: 20 INJECTION, SOLUTION EPIDURAL; INFILTRATION; INTRACAUDAL; PERINEURAL at 09:10

## 2024-12-24 RX ADMIN — AMLODIPINE BESYLATE 5 MG: 5 TABLET ORAL at 14:16

## 2024-12-24 RX ADMIN — METOCLOPRAMIDE 5 MG: 5 INJECTION, SOLUTION INTRAMUSCULAR; INTRAVENOUS at 08:36

## 2024-12-24 NOTE — ASSESSMENT & PLAN NOTE
"Presented to ER with c/o productive cough (duration unclear), new onset SOB, generalized weakness and malaise. Additionally with R sided pleuritic CP.   Noted to require 4L via O2 in ER per HPI   CT CAP w contrast showed \"Faint patchy consolidations throughout the lungs, as described, which may be secondary to aspiration or multifocal pneumonia. Recommend 3-month follow-up noncontrast CT of the chest to assess stability/resolution\"  Started on IV Zosyn,  Evaluated by pulmonology, suspect pneumonia versus aspiration pneumonitis/pneumonia  Trend CBC, temp curve and O2 sats  Continue to wean oxygen as able  Currently oxygenation is improving  "

## 2024-12-24 NOTE — ANESTHESIA POSTPROCEDURE EVALUATION
Post-Op Assessment Note    CV Status:  Stable    Pain management: adequate       Mental Status:  Alert and awake   Hydration Status:  Euvolemic   PONV Controlled:  Controlled   Airway Patency:  Patent     Post Op Vitals Reviewed: Yes    No anethesia notable event occurred.    Staff: CRNA           Last Filed PACU Vitals:  Vitals Value Taken Time   Temp 98.9 °F (37.2 °C) 12/24/24 0930   Pulse 86 12/24/24 0930   /69 12/24/24 0930   Resp 24 12/24/24 0930   SpO2 97 % 12/24/24 0930       Modified Katlyn:  Activity: 2 (12/24/2024  9:30 AM)  Respiration: 2 (12/24/2024  9:30 AM)  Circulation: 2 (12/24/2024  9:30 AM)  Consciousness: 0 (12/24/2024  9:30 AM)  Oxygen Saturation: 1 (12/24/2024  9:30 AM)  Modified Katlyn Score: 7 (12/24/2024  9:30 AM)

## 2024-12-24 NOTE — ASSESSMENT & PLAN NOTE
Presented to ER with c/o productive cough (duration unclear), new onset SOB, generalized weakness and malaise. Additionally with R sided pleuritic CP.  Ddx: Pneumonia versus aspiration pneumonitis/pneumonia  Patient is afebrile, w/o leukocytosis, normal procalcitonin  RSV/COVID/influenza is negative  Leaning towards pneumonitis the patient has episodes of similar admissions for same complaint going back to August   Pending cultures and serial procal levels  F/u sputum culture, gram stain, urine legionella/strep  Follow up blood cultures from admission  Supportive care with nebs and anti-tussives

## 2024-12-24 NOTE — ANESTHESIA PREPROCEDURE EVALUATION
Procedure:  EGD    Relevant Problems   CARDIO   (+) Hyperlipidemia   (+) Primary hypertension      ENDO   (+) Acquired hypothyroidism      GI/HEPATIC   (+) Erosive esophagitis   (+) Gastroesophageal reflux disease with esophagitis   (+) Hiatal hernia with GERD without esophagitis   (+) Upper GI bleed      HEMATOLOGY   (+) Iron deficiency anemia   (+) Microcytic anemia   (+) Nutritional anemia   (+) Severe iron deficiency anemia    (+) Vitamin B12 deficiency (dietary) anemia      MUSCULOSKELETAL   (+) Chronic bilateral low back pain without sciatica   (+) DDD (degenerative disc disease), lumbar   (+) Hiatal hernia with GERD without esophagitis   (+) Lumbar spondylosis      NEURO/PSYCH   (+) Anxiety   (+) Chronic bilateral low back pain without sciatica   (+) Headache   (+) Word finding difficulty      PULMONARY   (+) Acute hypoxic respiratory failure (HCC)   (+) Multifocal pneumonia      Digestive   (+) Melena      FEN/Gastrointestinal   (+) Schreiber's esophagus      Other   (+) Osteomyelitis, unspecified site, unspecified type (HCC)        Physical Exam    Airway    Mallampati score: III  TM Distance: >3 FB  Neck ROM: full     Dental    lower dentures and upper dentures,     Cardiovascular      Pulmonary   No wheezes    Other Findings  post-pubertal.    Anesthesia Plan  ASA Score- 2     Anesthesia Type- IV sedation with anesthesia with ASA Monitors.         Additional Monitors:     Airway Plan:     Comment: Gastric ultrasound reassuring.       Plan Factors-    Chart reviewed. EKG reviewed. Imaging results reviewed. Existing labs reviewed. Patient summary reviewed.    Patient is not a current smoker.  Patient did not smoke on day of surgery.            Induction- intravenous.    Postoperative Plan-     Perioperative Resuscitation Plan - Level 1 - Full Code.       Informed Consent- Anesthetic plan and risks discussed with patient.  I personally reviewed this patient with the CRNA. Discussed and agreed on the Anesthesia  "Plan with the CRNA..        VITALS  /93   Pulse 91   Temp 99 °F (37.2 °C) (Tympanic)   Resp 21   Ht 5' 2\" (1.575 m)   Wt 87.3 kg (192 lb 7.4 oz)   SpO2 98%   BMI 35.20 kg/m²  BP Readings from Last 3 Encounters:   12/24/24 153/93   11/22/24 148/86   10/29/24 150/100        LABS  Results from Last 12 Months   Lab Units 12/24/24  0454 12/23/24  0643 12/23/24  0159   WBC Thousand/uL 8.44  --  10.11   HEMOGLOBIN g/dL 10.4* 11.2* 10.7*   HEMATOCRIT % 31.8* 35.5 33.2*   PLATELETS Thousands/uL 233  --  215     Results from Last 12 Months   Lab Units 12/23/24  0159 09/16/24  0836 05/10/24  0500   INR  0.94 1.06 1.00   PTT seconds 28  --  26    Results from Last 12 Months   Lab Units 12/24/24  0454 12/23/24  0643 05/10/24  1324 05/10/24  0500 05/09/24  1212 05/09/24  0444 02/06/24  0728 01/13/24  0652   SODIUM mmol/L 134* 131*   < > 130*   < > 123*   < > 137   POTASSIUM mmol/L 4.0 4.1   < > 3.5   < > 3.4*   < > 3.8   CHLORIDE mmol/L 100 100   < > 92*   < > 87*   < > 99   CO2 mmol/L 30 21   < > 29   < > 26   < > 29   ANION GAP mmol/L 4 10   < > 9   < > 10   < > 9   BUN mg/dL 11 9   < > 10   < > 7   < > 11   CREATININE mg/dL 0.61 0.71   < > 0.63   < > 0.53*   < > 0.50*   CALCIUM mg/dL 9.2 8.1*   < > 8.9   < > 10.2   < > 9.0   GLUCOSE RANDOM mg/dL 98 139   < > 84   < > 104   < > 86   PHOSPHORUS mg/dL  --   --   --   --   --  3.4  --  3.2   MAGNESIUM mg/dL  --   --   --  1.9  --  1.6*  --  2.0    < > = values in this interval not displayed.        ECG      ECHOCARDIOGRAPHY OR OTHER TESTING/IMAGING  Encounter Date: 12/23/24   ECG 12 lead   Result Value    Ventricular Rate 104    Atrial Rate 104    VT Interval 122    QRSD Interval 80    QT Interval 306    QTC Interval 402    P Axis 77    QRS Axis 77    T Wave Axis 41    Narrative     Age and gender specific ECG analysis   Sinus tachycardia  Possible Inferior infarct , age undetermined  Possible Anterolateral infarct , age undetermined  Abnormal ECG  When compared with " ECG of 22-Nov-2024 06:45,  Questionable change in QRS axis  Nonspecific T wave abnormality now evident in Inferior leads  Nonspecific T wave abnormality now evident in Lateral leads  Confirmed by Anand Michelle ([id number]) on 12/23/2024 9:07:49 PM     No results found for this or any previous visit.   History    Heart failure, hypothyroidism, hypertension, aspiration pneumonia, hyperlipidemia, anemia, hiatal hernia, GERD, SIRS.     Interpretation Summary       •  Left Ventricle: Left ventricular cavity size is normal. Wall thickness is normal. The left ventricular ejection fraction is 60%. Systolic function is normal. Wall motion is normal. Diastolic function is normal.  •  Right Ventricle: Right ventricular cavity size is mildly dilated. Systolic function is mildly reduced.  •  Left Atrium: The atrium is mildly dilated.  •  Mitral Valve: There is mild annular calcification. There is mild regurgitation.  •  Tricuspid Valve: There is moderate regurgitation.     ------- ANESTHESIA RISK-BENEFIT DISCUSSION -------  BENEFITS OF A SPECIALIZED ANESTHESIA TEAM INCLUDE (NBK 810623, PMID 80851184):  (1) Reduce mortality and morbidity for major surgeries/procedures. (2) The team provides analgesia/sedation/amnesia/akinesia as safely as possible. (3) The team strives to reduce discomfort as safely as possible.    RISKS, AND PLANS TO MITIGATE RISKS, INCLUDE:    - Neurologic system: IntraOp awareness (Risk is ~1:1,000 - 1:14,000; PMID 86416138), Stroke (Risk ~<0.1-2% for most cases; PMID 20958913), nerve injury, vision loss, and POCD.     - Airway and Pulmonary system: Dental or mouth injury, throat pain, critical hypoxia, pneumothorax, prolonged intubation, post-op respiratory compromise.  Airway/Intubation risks and prior data:   Ventilated by mask (1); Technique: Direct laryngoscopy, Stylet; Type: Oral, Cuffed, Straight, Pre-curved, Inflated; Tube Size: 6.5 mm; Laryngoscope: Mac; Blade Size: 3; Location: Oral; Grade View: 1;  Insertion Attempts: 1; Placement Verification: End tidal CO2, Symmetrical chest wall movement;  Major ARISCAT risk factors for pulmonary complications include: Recent URI/PNA requiring Abx: 1 pt, yielding a score of 0-1= Low risk, 1.6%.  - Cardiovascular system: Hypotension, arrhythmias, cardiac injury or arrest, blood clots, bleeding, infection, vascular injuries.  Anil's RCRI score items: none, yielding an RCRI Score of 0= 0.4% risk of MACE  Are leroy-op or intra-op beta blockers indicated? (PMID 93308178): no  - FEN/GI system: Aspiration risk (~0.5% per PMC 4277127) and PONV (10-80% per Apfel score) especially if the patient has not fasted.  ASA NPO guideline compliance?: Yes  - Medication risk assessment: Allergic reactions, excessive bleeding with anticoagulant use, overdoses, drug-drug interactions, injury to a fetus or  in pregnant or breastfeeding patients, leroy-procedural sedation including while driving/operating machinery.  Recent relevant medications: See MAR or Med Review  Personal or family history of anesthesia complications: no  Pregnancy Status: N/A  - Estimate mortality risks associated with anesthesia based on ASA-PS (PMID 59296735): ASA-PS III: 1:3,500

## 2024-12-24 NOTE — QUICK NOTE
Patient with s/p EGD for evaluation of nausea and vomiting. Findings of esophagitis Vs Barrette's esophagus. Also hiatal hernia. No Obvious obstruction noted. Okay to resume gastroparesis diet. Recommend Prilosec 40mg po BID on discharge. Continue home dose Reglan 5mg po TID. Will plan for EGD with manometry in 4weeks. She will need to follow up with previous surgeon Dr David Cedeño and Dr Cadet for reevaluation of her previous TIF (will include in her AVS). Okay no more nausea/vomiting and she is tolerating diet without issues, okay for discharge from GI perspective. We will sign off at this time. Please reach out if further questions      Stefani Flynn MD  Gastroenterology Fellow, PGY- 4  Available on EPIC Secure Chat  12/24/2024 3:08 PM

## 2024-12-24 NOTE — ASSESSMENT & PLAN NOTE
" >>ASSESSMENT AND PLAN FOR EROSIVE ESOPHAGITIS WRITTEN ON 12/24/2024 12:17 PM BY CARLOS PALACIO DO    Patient with underlying history of severe gastroparesis, GERD  Chronic, associated with barrets esophagus, patient was scheduled for outpatient EGD. Imaging upon arrival showing \"Similar appearance of narrowing of the distal esophagus just proximal to the GE junction. Fluid throughout the esophagus. Recommend endoscopy if not recently performed to assess for the possibility of underlying lesion versus stricture\"  Evaluated by GI  Status post EGD today with the finding for medium hiatal hernia and grade D esophagitis in the lower third of the esophagus, cold forceps biopsy performed  Continue  PPI with the plan to repeat EGD in 2 to 3 months  Continue with Reglan 3 times daily  Follow on pathology report  Resume diet  "

## 2024-12-24 NOTE — ASSESSMENT & PLAN NOTE
Pneumonia noted on CT vs aspiration pneumonitis  Patient is afebrile, w/o leukocytosis, normal procalcitonin x2  RSV/COVID/influenza is negative  Evaluated by pulmonology suspect aspiration pneumonitis  Patient was taken off antibiotic and started on oral prednisone x 5 days  Continue for now, de-escalate pending cultures and serial procal levels  Negative urine legionella/strep  Follow up sputum and blood cultures from admission  Supportive care with nebs and anti-tussives

## 2024-12-24 NOTE — ASSESSMENT & PLAN NOTE
"Patient with underlying history of severe gastroparesis, GERD  Chronic, associated with barrets esophagus, patient was scheduled for outpatient EGD. Imaging upon arrival showing \"Similar appearance of narrowing of the distal esophagus just proximal to the GE junction. Fluid throughout the esophagus. Recommend endoscopy if not recently performed to assess for the possibility of underlying lesion versus stricture\"  Evaluated by GI  Status post EGD today with the finding for medium hiatal hernia and grade D esophagitis in the lower third of the esophagus, cold forceps biopsy performed  Continue  PPI with the plan to repeat EGD in 2 to 3 months  Continue with Reglan 3 times daily  Follow on pathology report  Resume diet  "

## 2024-12-24 NOTE — ANESTHESIA POSTPROCEDURE EVALUATION
Post-Op Assessment Note    CV Status:  Stable    Pain management: adequate       Mental Status:  Awake and alert   Hydration Status:  Stable   PONV Controlled:  None   Airway Patency:  Patent     Post Op Vitals Reviewed: Yes    No anethesia notable event occurred.    Staff: Anesthesiologist           Last Filed PACU Vitals:  Vitals Value Taken Time   Temp 98.9 °F (37.2 °C) 12/24/24 0930   Pulse 94 12/24/24 0945   /70 12/24/24 0945   Resp 18 12/24/24 0945   SpO2 97 % 12/24/24 0945       Modified Katlyn:  Activity: 2 (12/24/2024  9:46 AM)  Respiration: 2 (12/24/2024  9:46 AM)  Circulation: 2 (12/24/2024  9:46 AM)  Consciousness: 2 (12/24/2024  9:46 AM)  Oxygen Saturation: 1 (12/24/2024  9:46 AM)  Modified Katlyn Score: 9 (12/24/2024  9:46 AM)

## 2024-12-24 NOTE — UTILIZATION REVIEW
"Initial Clinical Review    Admission: Date/Time/Statement:   Admission Orders (From admission, onward)       Ordered        12/23/24 0433  INPATIENT ADMISSION  Once                          Orders Placed This Encounter   Procedures    INPATIENT ADMISSION     Standing Status:   Standing     Number of Occurrences:   1     Level of Care:   Med Surg [16]     Estimated length of stay:   More than 2 Midnights     Certification:   I certify that inpatient services are medically necessary for this patient for a duration of greater than two midnights. See H&P and MD Progress Notes for additional information about the patient's course of treatment.     ED Arrival Information       Expected   -    Arrival   12/23/2024 00:55    Acuity   Urgent              Means of arrival   Ambulance    Escorted by   [facility]    Service   Hospitalist    Admission type   Emergency              Arrival complaint   Cough             Chief Complaint   Patient presents with    Shortness of Breath     Pt here for worsening cough for the past few days and vomiting. Pt given 125 mg solumedrol, 4 mg zofran and breathing treatment by EMS. Pt also hypotensive for EMS, 80s systolic and given fluids.       Initial Presentation: 64 y.o. female to ED presents for Cough and generalized malaise x2 days. Pt was in her usual state of health up until 2 days ago when she developed a productive cough. Cough is productive of yellowish to greenish sputum and has now become constant over the last 2 days. Notes shortness of breath and generalized weakness. Also c/o right sided chest pain, sharp, worse with breathing. She was prescribed tessalon pearls but patient does not take due to insurance issues.   In ED;  Temp 97.6F, /68, 02 sat 97% on 4L 02.  Labs notable for Na 127, Lactic acid 2.8 > 3.3, procalcitonin < 0.05, wbc 10.11   CT chest \"Faint patchy consolidations throughout the lungs, as described, which may be secondary to aspiration or " multifocal pneumonia. Recommend 3-month follow-up noncontr   PMH for f chronic dysphagia, severe gastroparesis, hypertension, schizoaffective disorder, esophagitis, hiatal hernia, chronic iron deficiency anemia.    Admit to Inpatient Dx; Acute hypoxic respiratory failure. Multifocal pneumonia, Chronic hyponatremia; S/p 2L Iv fluids.   Plan; Requiring O2 4L NC. Not on home O2. Pulmonary consult. Iv antibiotics. Bld and sputum cultures, gram stain. Urine legionella/strep.  Albuterol prn. Antitussives prn. Tylenol prn fever. NPO until speech eval. GI consult. Iv PPI Bid due to dysphagia. Repeat sodium level. Hold further fluids.  On exam; Rhonchi. Lethargic. Toxic appearing    12/23  GI cons; Nausea and vomiting. History of severe gastroparesis, GERD, hiatal hernia s/p cTIF in 2021, previous rosas's esophagus   Speech eval today. Plan for EGD tomorrow 12/24. NPO. Iv PPI Bid. Resume home dose Reglan 5 mg p.o. Tid.    Pulmonology cons; New O2 requirement of up to 4 L. Titrate as needed to keep SpO2 greater 92. Wean supplemental O2.    Iv antibiotics. Check procalcitonin level in am. Repeat CXR tomorrow 12/24.   Bld and sputum cultures pending. Urine legionella/strep. Start Prednisone 40 mg daily for 5 days.   On exam; Decrease breath sounds    Date: 12/24   Day 2:   Per Pulmonology; Currently on O2 2-3 L. Monitor off antibiotics, initial procalcitonin negative. F/u Bld and sputum cultures. Plan for EGD today with GI.     Progress notes; S/p EGD today. F/u Pathology report.   Wean O2 as able. Currently on O2 2L NC. S/p Iv antibiotics. Prednisone x5 days.   F/u Bld and sputum cultures. Continue Iv PPI q12h and IV Reglan Tid.   BP was low on admit, Amlodipine was held, resume.       Per GI; S/p EGD; Findings of esophagitis Vs Barrette's esophagus. Also hiatal hernia. No Obvious obstruction noted.   Okay to resume gastroparesis diet. Recommend Prilosec 40mg po BID on discharge. Continue home dose Reglan 5mg po TID.   Plan  for EGD with manometry in 4 wks.     12/24 S/p EGD;   IMPRESSION:  The stomach and duodenum appeared normal.  Medium hiatal hernia  Grade D esophagitis in the lower third of the esophagus; performed cold forceps biopsy  Area of previous TIF observed with moderate slack passing the scope through.      RECOMMENDATION:  Await pathology results   Okay to return to floor  Okay to resume and advance diet per SLP recs  Start PPI po BID for suspect severe reflux esophagitis  Will arrange for repeat EGD in about 2-3 months  Continue scheduled home dose reglan 5mg TID.           ED Treatment-Medication Administration from 12/23/2024 0055 to 12/23/2024 0514         Date/Time Order Dose Route Action     12/23/2024 0106 albuterol (FOR EMS ONLY) (2.5 mg/3 mL) 0.083 % inhalation solution 5 mg 0 mg Does not apply Given to EMS     12/23/2024 0106 ondansetron (FOR EMS ONLY) (ZOFRAN) 4 mg/2 mL injection 4 mg 0 mg Does not apply Given to EMS     12/23/2024 0106 methylPREDNISolone sodium succinate (FOR EMS ONLY) (Solu-MEDROL) 125 MG injection 125 mg 0 mg Does not apply Given to EMS     12/23/2024 0106 ipratropium-albuterol (FOR EMS ONLY) (DUO-NEB) 0.5-2.5 mg/3 mL inhalation solution 3 mL 0 mL Does not apply Given to EMS     12/23/2024 0231 metoclopramide (REGLAN) injection 10 mg 10 mg Intravenous Given     12/23/2024 0303 iohexol (OMNIPAQUE) 350 MG/ML injection (SINGLE-DOSE) 100 mL 100 mL Intravenous Given     12/23/2024 0504 vancomycin (VANCOCIN) 2,000 mg in sodium chloride 0.9 % 500 mL IVPB 2,000 mg Intravenous New Bag     12/23/2024 0347 piperacillin-tazobactam (ZOSYN) 4.5 g in sodium chloride 0.9 % 100 mL IVPB 4.5 g Intravenous New Bag            Scheduled Medications:  enoxaparin, 40 mg, Subcutaneous, Daily  levalbuterol, 1.25 mg, Nebulization, TID  metoclopramide, 5 mg, Intravenous, TID  [START ON 12/25/2024] pantoprazole, 40 mg, Oral, BID AC  pantoprazole, 40 mg, Intravenous, Q12H YUMIKO  predniSONE, 40 mg, Oral, Daily  QUEtiapine,  400 mg, Oral, HS    piperacillin-tazobactam (ZOSYN) 4.5 g in sodium chloride 0.9 % 100 mL IVPB (EXTENDED INFUSION)  Dose: 4.5 g  Freq: Every 8 hours Route: IV  Last Dose: 4.5 g (12/23/24 0069)  Start: 12/23/24 0800 End: 12/24/24 0908      Continuous IV Infusions: None     PRN Meds:  albuterol, 2.5 mg, Nebulization, Q4H PRN  benzonatate, 200 mg, Oral, TID PRN  ondansetron, 4 mg, Intravenous, Q6H PRN      ED Triage Vitals   Temperature Pulse Respirations Blood Pressure SpO2 Pain Score   12/23/24 0102 12/23/24 0101 12/23/24 0101 12/23/24 0102 12/23/24 0101 12/23/24 0520   97.6 °F (36.4 °C) (!) 110 22 111/68 100 % No Pain     Weight (last 2 days)       Date/Time Weight    12/23/24 05:23:27 87.3 (192.46)            Vital Signs (last 3 days)       Date/Time Temp Pulse Resp BP MAP (mmHg) SpO2 Calculated FIO2 (%) - Nasal Cannula Nasal Cannula O2 Flow Rate (L/min) O2 Device Patient Position - Orthostatic VS Marcella Coma Scale Score Pain    12/24/24 1118 -- -- -- -- -- 100 % -- -- -- -- -- --    12/24/24 0945 -- 94 18 148/70 -- 97 % 28 2 L/min Nasal cannula -- -- --    12/24/24 0930 98.9 °F (37.2 °C) 86 24 121/69 -- 97 % 32 3 L/min Nasal cannula -- -- --    12/24/24 0851 99 °F (37.2 °C) 91 21 153/93 -- 98 % 28 2 L/min Nasal cannula -- -- No Pain    12/24/24 07:05:35 -- 96 -- 150/88 109 97 % -- -- -- -- -- --    12/23/24 2300 -- -- -- -- -- -- -- -- -- -- 15 No Pain    12/23/24 21:40:29 98.4 °F (36.9 °C) 104 -- 119/77 91 95 % -- -- -- -- -- --    12/23/24 1848 -- -- -- -- -- 97 % -- -- -- -- -- --    12/23/24 15:56:50 98.9 °F (37.2 °C) -- -- 121/77 92 -- -- -- -- -- -- --    12/23/24 1500 -- -- -- -- -- -- -- -- -- -- 15 No Pain    12/23/24 0948 -- -- -- -- -- 97 % -- -- -- -- -- --    12/23/24 0550 -- 106 -- -- -- 97 % -- -- -- -- -- --    12/23/24 0538 -- -- -- -- -- -- -- -- -- -- -- No Pain    12/23/24 05:23:27 99 °F (37.2 °C) 109 23 120/79 93 96 % -- -- -- -- -- --    12/23/24 0520 -- -- -- -- -- -- 28 2 L/min Nasal  cannula -- 15 No Pain    12/23/24 0315 -- 98 22 111/63 -- 97 % 36 4 L/min Nasal cannula -- -- --    12/23/24 0310 -- -- -- -- -- -- -- -- -- -- 15 --    12/23/24 0102 97.6 °F (36.4 °C) -- -- 111/68 -- -- -- -- -- -- -- --    12/23/24 0101 -- 110 22 -- -- 100 % -- -- -- Lying -- --              Pertinent Labs/Diagnostic Test Results:   Radiology:  CT chest abdomen pelvis w contrast   Final Interpretation by Rakan Harvey DO (12/23 0333)      Faint patchy consolidations throughout the lungs, as described, which may be secondary to aspiration or multifocal pneumonia. Recommend 3-month follow-up noncontrast CT of the chest to assess stability/resolution.      Similar appearance of narrowing of the distal esophagus just proximal to the GE junction. Fluid throughout the esophagus. Recommend endoscopy if not recently performed to assess for the possibility of underlying lesion versus stricture.         The study was marked in EPIC for immediate notification.      Workstation performed: [id number]           Cardiology:  ECG 12 lead   Final Result by Anand Michelle MD (12/23 8957)   Age and gender specific ECG analysis    Sinus tachycardia   Possible Inferior infarct , age undetermined   Possible Anterolateral infarct , age undetermined   Abnormal ECG   When compared with ECG of 22-Nov-2024 06:45,   Questionable change in QRS axis   Nonspecific T wave abnormality now evident in Inferior leads   Nonspecific T wave abnormality now evident in Lateral leads   Confirmed by Anand Michelle ([id number]) on 12/23/2024 9:07:49 PM        GI:  EGD   Final Result by Elizabeth Ch MD (12/24 1010)   The stomach and duodenum appeared normal.   Medium hiatal hernia   Grade D esophagitis in the lower third of the esophagus; performed cold    forceps biopsy   Area of previous TIF observed with moderate slack passing the scope    through.         RECOMMENDATION:   Await pathology results    Okay to return to floor   Okay to resume and advance diet per  "SLP recs   Start PPI po BID for suspect severe reflux esophagitis   Will arrange for repeat EGD in about 2-3 months   Continue scheduled home dose reglan 5mg TID.                         Elizabeth Ch MD               Results from last 7 days   Lab Units 12/24/24  0454 12/23/24  0643 12/23/24  0159   WBC Thousand/uL 8.44  --  10.11   HEMOGLOBIN g/dL 10.4* 11.2* 10.7*   HEMATOCRIT % 31.8* 35.5 33.2*   PLATELETS Thousands/uL 233  --  215         Results from last 7 days   Lab Units 12/24/24  0454 12/23/24  0643 12/23/24  0159   SODIUM mmol/L 134* 131* 127*   POTASSIUM mmol/L 4.0 4.1 3.6   CHLORIDE mmol/L 100 100 93*   CO2 mmol/L 30 21 23   ANION GAP mmol/L 4 10 11   BUN mg/dL 11 9 11   CREATININE mg/dL 0.61 0.71 0.71   EGFR ml/min/1.73sq m 96 90 90   CALCIUM mg/dL 9.2 8.1* 8.3*     Results from last 7 days   Lab Units 12/24/24  0454 12/23/24  0159   AST U/L 18 19   ALT U/L 19 12   ALK PHOS U/L 60 66   TOTAL PROTEIN g/dL 5.9* 5.8*   ALBUMIN g/dL 3.8 3.7   TOTAL BILIRUBIN mg/dL 0.30 0.24         Results from last 7 days   Lab Units 12/24/24  0454 12/23/24  0643 12/23/24  0159   GLUCOSE RANDOM mg/dL 98 139 137             No results found for: \"BETA-HYDROXYBUTYRATE\"                   Results from last 7 days   Lab Units 12/23/24  0159   HS TNI 0HR ng/L <2         Results from last 7 days   Lab Units 12/23/24  0159   PROTIME seconds 12.9   INR  0.94   PTT seconds 28         Results from last 7 days   Lab Units 12/24/24  0454 12/23/24  0159   PROCALCITONIN ng/ml 0.20 <0.05     Results from last 7 days   Lab Units 12/23/24  1048 12/23/24  0643 12/23/24  0422 12/23/24  0159   LACTIC ACID mmol/L 2.7* 2.9* 3.3* 2.8*       Results from last 7 days   Lab Units 12/23/24  0159   CLARITY UA  Cloudy   COLOR UA  Yellow   SPEC GRAV UA  1.025   PH UA  5.5   GLUCOSE UA mg/dl Negative   KETONES UA mg/dl Negative   BLOOD UA  Trace*   PROTEIN UA mg/dl Trace*   NITRITE UA  Negative   BILIRUBIN UA  Negative   UROBILINOGEN UA (BE) mg/dl " <2.0   LEUKOCYTES UA  Negative   WBC UA /hpf None Seen   RBC UA /hpf 2-4*   BACTERIA UA /hpf Occasional   EPITHELIAL CELLS WET PREP /hpf Occasional     Results from last 7 days   Lab Units 12/24/24  0329   STREP PNEUMONIAE ANTIGEN, URINE  Negative   LEGIONELLA URINARY ANTIGEN  Negative                             Results from last 7 days   Lab Units 12/23/24  0159 12/23/24  0135   BLOOD CULTURE  No Growth at 24 hrs. Gram Positive Cocci*   GRAM STAIN RESULT   --  Gram positive cocci in pairs and chains*                   Past Medical History:   Diagnosis Date    Anemia     Anxiety     Arthritis     Back pain at L4-L5 level     Schreiber esophagus     Bulimia     Colon polyp     Disease of thyroid gland     hypothyroid    GERD (gastroesophageal reflux disease)     Hearing loss in left ear     History of transfusion     Hyperlipidemia     Hypertension     Hypothyroidism     Leukopenia     NMS (neuroleptic malignant syndrome) 01/03/2020    Pneumonia     RA (rheumatoid arthritis) (HCC)     in feet    Sciatica     Seizure (HCC)     due to medication mix up 8/2018 only one historically    Skin spots, red     lower right arm - poss from cat- no s/s infection    Synovial cyst of lumbar spine     Vertigo     Wears dentures     full set    Weight gain      Present on Admission:   Acute hypoxic respiratory failure (HCC)   Multifocal pneumonia   Schizoaffective disorder (HCC)   Primary hypertension   Hyperlipidemia   Erosive esophagitis   Chronic hyponatremia   Elevated lactic acid level   Gastroparesis   Nausea and vomiting   Gastroesophageal reflux disease with esophagitis      Admitting Diagnosis: Shortness of breath [R06.02]  Hyponatremia [E87.1]  Vomiting [R11.10]  Pneumonia [J18.9]  Hypoxia [R09.02]  Abnormal CT scan [R93.89]  Elevated lactic acid level [R79.89]  Age/Sex: 64 y.o. female    Network Utilization Review Department  ATTENTION: Please call with any questions or concerns to [phone] and carefully listen to the  prompts so that you are directed to the right person. All voicemails are confidential.   For Discharge needs, contact Care Management DC Support Team at [phone] opt. 2  Send all requests for admission clinical reviews, approved or denied determinations and any other requests to dedicated fax number below belonging to the campus where the patient is receiving treatment. List of dedicated fax numbers for the Facilities:  FACILITY NAME UR FAX NUMBER   ADMISSION DENIALS (Administrative/Medical Necessity) [phone]   DISCHARGE SUPPORT TEAM (NETWORK) [phone]   PARENT CHILD HEALTH (Maternity/NICU/Pediatrics) [phone]   [facility] [phone]   [facility] [phone]   [facility] [phone]   [facility] [phone]   [facility] [phone]   [facility] [phone]   [facility] [phone]   [facility] [phone]   [facility] [phone]   [facility] [phone]   [facility] [phone]   [facility] [phone]

## 2024-12-24 NOTE — PROGRESS NOTES
"Progress Note - Pulmonology   Name: Irene Plascencia 64 y.o. female I MRN: [id number]  Unit/Bed#: -01 I Date of Admission: 12/23/2024   Date of Service: 12/24/2024 I Hospital Day: 1     Assessment & Plan  Acute hypoxic respiratory failure (HCC)  Presented to ER with c/o productive cough (duration unclear), new onset SOB, generalized weakness and malaise. Additionally with R sided pleuritic CP.   Ddx: Pneumonitis versus pneumonia  New O2 requirement of up to 4 L  Titrate as needed to keep SpO2 greater 92%  CT CAP w contrast showed \"Faint patchy consolidations throughout the lungs, as described, which may be secondary to aspiration or multifocal pneumonia. Recommend 3-month follow-up noncontrast CT of the chest to assess stability/resolution\"  Started on IV Zosyn, initial procal negative.   Multifocal pneumonia  Presented to ER with c/o productive cough (duration unclear), new onset SOB, generalized weakness and malaise. Additionally with R sided pleuritic CP.  Ddx: Pneumonia versus aspiration pneumonitis/pneumonia  Patient is afebrile, w/o leukocytosis, normal procalcitonin  RSV/COVID/influenza is negative  Leaning towards pneumonitis the patient has episodes of similar admissions for same complaint going back to August   Pending cultures and serial procal levels  F/u sputum culture, gram stain, urine legionella/strep  Follow up blood cultures from admission  Supportive care with nebs and anti-tussives         Plan:  Monitor off antibiotics  Continue Prednisone for 5 days  Continue supportive measures with nebs and antitussives    24 Hour Events : GI planning for EGD today  Subjective : Unable to see the patient, will check back after EGD    Objective :  Temp:  [98.4 °F (36.9 °C)-98.9 °F (37.2 °C)] 98.4 °F (36.9 °C)  HR:  [104] 104  BP: (119-121)/(77) 119/77  SpO2:  [95 %-97 %] 95 %    Physical Exam Unable tos ee patient, she was in EGD, will check back on her      Lab Results: I have reviewed the following " results:   .     12/23/24  1048 12/24/24  0454   WBC  --  8.44   HGB  --  10.4*   HCT  --  31.8*   PLT  --  233   SODIUM  --  134*   K  --  4.0   CL  --  100   CO2  --  30   BUN  --  11   CREATININE  --  0.61   GLUC  --  98   AST  --  18   ALT  --  19   ALB  --  3.8   TBILI  --  0.30   ALKPHOS  --  60   LACTICACID 2.7*  --      ABG: No new results in last 24 hours.

## 2024-12-24 NOTE — ASSESSMENT & PLAN NOTE
"Presented to ER with c/o productive cough (duration unclear), new onset SOB, generalized weakness and malaise. Additionally with R sided pleuritic CP.   Ddx: Pneumonitis versus pneumonia  New O2 requirement of up to 4 L  Titrate as needed to keep SpO2 greater 92%  CT CAP w contrast showed \"Faint patchy consolidations throughout the lungs, as described, which may be secondary to aspiration or multifocal pneumonia. Recommend 3-month follow-up noncontrast CT of the chest to assess stability/resolution\"  Started on IV Zosyn, initial procal negative.   "

## 2024-12-24 NOTE — PROGRESS NOTES
"Progress Note - Hospitalist   Name: Irene Plascencia 64 y.o. female I MRN: [id number]  Unit/Bed#: -01 I Date of Admission: 12/23/2024   Date of Service: 12/24/2024 I Hospital Day: 1    Assessment & Plan  Acute hypoxic respiratory failure (HCC)  Presented to ER with c/o productive cough (duration unclear), new onset SOB, generalized weakness and malaise. Additionally with R sided pleuritic CP.   Noted to require 4L via O2 in ER per HPI   CT CAP w contrast showed \"Faint patchy consolidations throughout the lungs, as described, which may be secondary to aspiration or multifocal pneumonia. Recommend 3-month follow-up noncontrast CT of the chest to assess stability/resolution\"  Started on IV Zosyn,  Evaluated by pulmonology, suspect pneumonia versus aspiration pneumonitis/pneumonia  Trend CBC, temp curve and O2 sats  Continue to wean oxygen as able  Currently oxygenation is improving  Multifocal pneumonia  Pneumonia noted on CT vs aspiration pneumonitis  Patient is afebrile, w/o leukocytosis, normal procalcitonin x2  RSV/COVID/influenza is negative  Evaluated by pulmonology suspect aspiration pneumonitis  Patient was taken off antibiotic and started on oral prednisone x 5 days  Continue for now, de-escalate pending cultures and serial procal levels  Negative urine legionella/strep  Follow up sputum and blood cultures from admission  Supportive care with nebs and anti-tussives    Erosive esophagitis  Patient with underlying history of severe gastroparesis, GERD  Chronic, associated with barrets esophagus, patient was scheduled for outpatient EGD. Imaging upon arrival showing \"Similar appearance of narrowing of the distal esophagus just proximal to the GE junction. Fluid throughout the esophagus. Recommend endoscopy if not recently performed to assess for the possibility of underlying lesion versus stricture\"  Evaluated by GI  Status post EGD today with the finding for medium hiatal hernia and grade D esophagitis " in the lower third of the esophagus, cold forceps biopsy performed  Continue  PPI with the plan to repeat EGD in 2 to 3 months  Continue with Reglan 3 times daily  Follow on pathology report  Resume diet  Chronic hyponatremia  Thought to be due to psych meds per note review, chronic  Received 2L IVF prior to admission  Improved s/p IVF  Monitor    Elevated lactic acid level  Improved with IV fluid  Hyperlipidemia  Continue with statin  Primary hypertension  BP was low on presentation   Amlodipine was held  Resume amlodipine  Schizoaffective disorder (HCC)  Continue home meds   Gastroparesis  Resume reglan with meals once tolerating po       VTE Pharmacologic Prophylaxis: VTE Score: 6 High Risk (Score >/= 5) - Pharmacological DVT Prophylaxis Ordered: enoxaparin (Lovenox). Sequential Compression Devices Ordered.    Mobility:   Basic Mobility Inpatient Raw Score: 24  JH-HLM Goal: 8: Walk 250 feet or more  JH-HLM Achieved: 6: Walk 10 steps or more  JH-HLM Goal NOT achieved. Continue with multidisciplinary rounding and encourage appropriate mobility to improve upon JH-HLM goals.    Patient Centered Rounds: I performed bedside rounds with nursing staff today.   Discussions with Specialists or Other Care Team Provider: Nursing    Education and Discussions with Family / Patient: Attempted to update  (partner) via phone. Unable to contact.    Current Length of Stay: 1 day(s)  Current Patient Status: Inpatient   Certification Statement: The patient will continue to require additional inpatient hospital stay due to acute hypoxic spectra failure  Discharge Plan: Anticipate discharge in 48-72 hrs to TBD    Code Status: Level 1 - Full Code    Subjective   Patient seen and examined   Comfortable in bed  Just came back from EGD  No chest pain or shortness of breath      Objective :  Temp:  [98.4 °F (36.9 °C)-99 °F (37.2 °C)] 98.9 °F (37.2 °C)  HR:  [] 94  BP: (119-153)/(69-93) 148/70  Resp:  [18-24] 18  SpO2:   [95 %-100 %] 100 %  O2 Device: Nasal cannula  Nasal Cannula O2 Flow Rate (L/min):  [2 L/min-3 L/min] 2 L/min    Body mass index is 35.2 kg/m².     Input and Output Summary (last 24 hours):     Intake/Output Summary (Last 24 hours) at 12/24/2024 1217  Last data filed at 12/24/2024 0931  Gross per 24 hour   Intake 223 ml   Output 900 ml   Net -677 ml       Physical Exam  Patient is awake alert oriented in no acute distress  Comfortable in bed  On nasal cannula 2 L/min  Lungs with decreased breath sound bilateral  Heart positive S1-S2 no murmur   Abdomen soft nontender  Lower extremities no edema    Lines/Drains:        Lab Results: I have reviewed the following results:   Results from last 7 days   Lab Units 12/24/24  0454 12/23/24  0643 12/23/24  0159   WBC Thousand/uL 8.44  --  10.11   HEMOGLOBIN g/dL 10.4*   < > 10.7*   HEMATOCRIT % 31.8*   < > 33.2*   PLATELETS Thousands/uL 233  --  215   LYMPHO PCT %  --   --  2*   MONO PCT %  --   --  4   EOS PCT %  --   --  0    < > = values in this interval not displayed.     Results from last 7 days   Lab Units 12/24/24  0454   SODIUM mmol/L 134*   POTASSIUM mmol/L 4.0   CHLORIDE mmol/L 100   CO2 mmol/L 30   BUN mg/dL 11   CREATININE mg/dL 0.61   ANION GAP mmol/L 4   CALCIUM mg/dL 9.2   ALBUMIN g/dL 3.8   TOTAL BILIRUBIN mg/dL 0.30   ALK PHOS U/L 60   ALT U/L 19   AST U/L 18   GLUCOSE RANDOM mg/dL 98     Results from last 7 days   Lab Units 12/23/24  0159   INR  0.94             Results from last 7 days   Lab Units 12/24/24  0454 12/23/24  1048 12/23/24  0643 12/23/24  0422 12/23/24  0159   LACTIC ACID mmol/L  --  2.7* 2.9* 3.3* 2.8*   PROCALCITONIN ng/ml 0.20  --   --   --  <0.05       Recent Cultures (last 7 days):   Results from last 7 days   Lab Units 12/24/24  0329 12/23/24  0159 12/23/24  0135   BLOOD CULTURE   --  No Growth at 24 hrs. Gram Positive Cocci*   GRAM STAIN RESULT   --   --  Gram positive cocci in pairs and chains*   LEGIONELLA URINARY ANTIGEN  Negative  --    --        Imaging Results Review: I reviewed radiology reports from this admission including: EGD.  Other Study Results Review: Other studies reviewed include:      Last 24 Hours Medication List:     Current Facility-Administered Medications:     albuterol inhalation solution 2.5 mg, Q4H PRN    amLODIPine (NORVASC) tablet 5 mg, Daily    benzonatate (TESSALON PERLES) capsule 200 mg, TID PRN    enoxaparin (LOVENOX) subcutaneous injection 40 mg, Daily    levalbuterol (XOPENEX) inhalation solution 1.25 mg, TID    metoclopramide (REGLAN) injection 5 mg, TID    ondansetron (ZOFRAN) injection 4 mg, Q6H PRN    [START ON 12/25/2024] pantoprazole (PROTONIX) EC tablet 40 mg, BID AC    pantoprazole (PROTONIX) injection 40 mg, Q12H YUMIKO    predniSONE tablet 40 mg, Daily    QUEtiapine (SEROquel) tablet 400 mg, HS    Administrative Statements   Today, Patient Was Seen By: Jim Erickson, DO  I have spent a total time of 40 minutes in caring for this patient on the day of the visit/encounter including Counseling / Coordination of care, Documenting in the medical record, Reviewing / ordering tests, medicine, procedures  , Obtaining or reviewing history  , and Communicating with other healthcare professionals .    **Please Note: This note may have been constructed using a voice recognition system.**

## 2024-12-24 NOTE — ASSESSMENT & PLAN NOTE
Thought to be due to psych meds per note review, chronic  Received 2L IVF prior to admission  Improved s/p IVF  Monitor

## 2024-12-25 LAB
ANION GAP SERPL CALCULATED.3IONS-SCNC: 6 MMOL/L (ref 4–13)
BUN SERPL-MCNC: 11 MG/DL (ref 5–25)
CALCIUM SERPL-MCNC: 9.7 MG/DL (ref 8.4–10.2)
CHLORIDE SERPL-SCNC: 97 MMOL/L (ref 96–108)
CO2 SERPL-SCNC: 31 MMOL/L (ref 21–32)
CREAT SERPL-MCNC: 0.53 MG/DL (ref 0.6–1.3)
GFR SERPL CREATININE-BSD FRML MDRD: 100 ML/MIN/1.73SQ M
GLUCOSE SERPL-MCNC: 88 MG/DL (ref 65–140)
MAGNESIUM SERPL-MCNC: 2.1 MG/DL (ref 1.9–2.7)
POTASSIUM SERPL-SCNC: 3.8 MMOL/L (ref 3.5–5.3)
SODIUM SERPL-SCNC: 134 MMOL/L (ref 135–147)

## 2024-12-25 PROCEDURE — 94664 DEMO&/EVAL PT USE INHALER: CPT

## 2024-12-25 PROCEDURE — 94640 AIRWAY INHALATION TREATMENT: CPT

## 2024-12-25 PROCEDURE — 94668 MNPJ CHEST WALL SBSQ: CPT

## 2024-12-25 PROCEDURE — 99232 SBSQ HOSP IP/OBS MODERATE 35: CPT | Performed by: INTERNAL MEDICINE

## 2024-12-25 PROCEDURE — 83735 ASSAY OF MAGNESIUM: CPT | Performed by: INTERNAL MEDICINE

## 2024-12-25 PROCEDURE — 80048 BASIC METABOLIC PNL TOTAL CA: CPT | Performed by: INTERNAL MEDICINE

## 2024-12-25 PROCEDURE — 94760 N-INVAS EAR/PLS OXIMETRY 1: CPT

## 2024-12-25 RX ORDER — GUAIFENESIN 600 MG/1
600 TABLET, EXTENDED RELEASE ORAL EVERY 12 HOURS SCHEDULED
Status: DISCONTINUED | OUTPATIENT
Start: 2024-12-25 | End: 2024-12-26 | Stop reason: HOSPADM

## 2024-12-25 RX ORDER — CALCIUM CARBONATE 500 MG/1
500 TABLET, CHEWABLE ORAL DAILY PRN
Status: DISCONTINUED | OUTPATIENT
Start: 2024-12-25 | End: 2024-12-26 | Stop reason: HOSPADM

## 2024-12-25 RX ADMIN — CALCIUM CARBONATE (ANTACID) CHEW TAB 500 MG 500 MG: 500 CHEW TAB at 23:36

## 2024-12-25 RX ADMIN — ENOXAPARIN SODIUM 40 MG: 40 INJECTION SUBCUTANEOUS at 08:47

## 2024-12-25 RX ADMIN — PAROXETINE HYDROCHLORIDE 30 MG: 20 TABLET, FILM COATED ORAL at 08:47

## 2024-12-25 RX ADMIN — METOCLOPRAMIDE 5 MG: 10 TABLET ORAL at 17:12

## 2024-12-25 RX ADMIN — AMLODIPINE BESYLATE 5 MG: 5 TABLET ORAL at 08:47

## 2024-12-25 RX ADMIN — METOCLOPRAMIDE 5 MG: 10 TABLET ORAL at 08:48

## 2024-12-25 RX ADMIN — LEVALBUTEROL HYDROCHLORIDE 1.25 MG: 1.25 SOLUTION RESPIRATORY (INHALATION) at 08:45

## 2024-12-25 RX ADMIN — LEVALBUTEROL HYDROCHLORIDE 1.25 MG: 1.25 SOLUTION RESPIRATORY (INHALATION) at 12:30

## 2024-12-25 RX ADMIN — LEVALBUTEROL HYDROCHLORIDE 1.25 MG: 1.25 SOLUTION RESPIRATORY (INHALATION) at 19:50

## 2024-12-25 RX ADMIN — METOCLOPRAMIDE 5 MG: 10 TABLET ORAL at 13:25

## 2024-12-25 RX ADMIN — PANTOPRAZOLE SODIUM 40 MG: 40 TABLET, DELAYED RELEASE ORAL at 05:27

## 2024-12-25 RX ADMIN — PREDNISONE 40 MG: 20 TABLET ORAL at 08:47

## 2024-12-25 RX ADMIN — GUAIFENESIN 600 MG: 600 TABLET, EXTENDED RELEASE ORAL at 13:24

## 2024-12-25 RX ADMIN — ZIPRASIDONE HYDROCHLORIDE 80 MG: 40 CAPSULE ORAL at 08:47

## 2024-12-25 RX ADMIN — LORAZEPAM 2 MG: 1 TABLET ORAL at 23:12

## 2024-12-25 RX ADMIN — PANTOPRAZOLE SODIUM 40 MG: 40 TABLET, DELAYED RELEASE ORAL at 17:11

## 2024-12-25 RX ADMIN — QUETIAPINE FUMARATE 400 MG: 100 TABLET ORAL at 21:58

## 2024-12-25 NOTE — UTILIZATION REVIEW
"NOTIFICATION OF INPATIENT ADMISSION   AUTHORIZATION REQUEST   SERVICING FACILITY:   [facility]  Address: [address] [place] [address]  Tax ID: [id number]  NPI: [id number] ATTENDING PROVIDER:  Attending Name and NPI#: Jim Erickson Do [[id number]]  Address: [address] [place] [address]  Phone: [phone]   ADMISSION INFORMATION:  Place of Service: Inpatient [facility]  Place of Service Code: 21  Inpatient Admission Date/Time: 12/23/24  4:33 AM  Discharge Date/Time: No discharge date for patient encounter.  Admitting Diagnosis Code/Description:  Shortness of breath [R06.02]  Hyponatremia [E87.1]  Vomiting [R11.10]  Pneumonia [J18.9]  Hypoxia [R09.02]  Abnormal CT scan [R93.89]  Elevated lactic acid level [R79.89]     UTILIZATION REVIEW CONTACT:  Doris Phelps"Janae\"Domingo Utilization   Network Utilization Review Department  Phone: [phone]  Fax: [phone]  Email: [email or web address]  Contact for approvals/pending authorizations, clinical reviews, and discharge.     PHYSICIAN ADVISORY SERVICES:  Medical Necessity Denial & Xbzp-gz-Trba Review  Phone: [phone]  Fax: [phone]  Email: [email or web address]     DISCHARGE SUPPORT TEAM:  For Patients Discharge Needs & Updates  Phone: [phone] opt. 2 Fax: [phone]  Email: [email or web address]      "

## 2024-12-25 NOTE — UTILIZATION REVIEW
Continued Stay Review    SEE INITIAL REVIEW AT BOTTOM    Date: 12/25                          Current Patient Class: Inpatient  Current Level of Care: MS    HPI:64 y.o. female initially admitted on  12/23    Assessment/Plan: Pt continue to have dyspnea and intermittent cough. O2 weaned down to 1L NC. Continue to wean oxygen as able. Trend CBC, temp curve and O2 sats. 1 out of 2 blood culture are positive for Staph aureus likely contamination. Continue to observe off antibiotic. Supportive care with nebs and anti-tussives. Cont PPI. Cont reglan. F/u pathology.    PE: Lung clear to auscultation bilateral with intermittent rhonchi

## 2024-12-25 NOTE — ASSESSMENT & PLAN NOTE
" >>ASSESSMENT AND PLAN FOR EROSIVE ESOPHAGITIS WRITTEN ON 12/25/2024 11:39 AM BY CARLOS PALACIO DO    Patient with underlying history of severe gastroparesis, GERD  Chronic, associated with barrets esophagus, patient was scheduled for outpatient EGD. Imaging upon arrival showing \"Similar appearance of narrowing of the distal esophagus just proximal to the GE junction. Fluid throughout the esophagus. Recommend endoscopy if not recently performed to assess for the possibility of underlying lesion versus stricture\"  Evaluated by GI  Status post EGD on 12/24 with the finding for medium hiatal hernia and grade D esophagitis in the lower third of the esophagus, cold forceps biopsy performed  Continue  PPI twice daily with the plan to repeat EGD in 2 to 3 months  Continue with Reglan 3 times daily  Follow on pathology report  "

## 2024-12-25 NOTE — PROGRESS NOTES
"Progress Note - Hospitalist   Name: Irene Plascencia 64 y.o. female I MRN: [id number]  Unit/Bed#: -01 I Date of Admission: 12/23/2024   Date of Service: 12/25/2024 I Hospital Day: 2    Assessment & Plan  Acute hypoxic respiratory failure (HCC)  Presented to ER with c/o productive cough (duration unclear), new onset SOB, generalized weakness and malaise. Additionally with R sided pleuritic CP.   Noted to require 4L via O2 in ER per HPI   CT CAP w contrast showed \"Faint patchy consolidations throughout the lungs, as described, which may be secondary to aspiration or multifocal pneumonia. Recommend 3-month follow-up noncontrast CT of the chest to assess stability/resolution\"  Started on IV Zosyn,  Evaluated by pulmonology, suspect aspiration pneumonitis  Trend CBC, temp curve and O2 sats  Continue to wean oxygen as able  Improving  Multifocal pneumonia  Pneumonia noted on CT vs aspiration pneumonitis  Patient is afebrile, w/o leukocytosis, normal procalcitonin x2  RSV/COVID/influenza is negative  Evaluated by pulmonology suspect aspiration pneumonitis  Patient was taken off antibiotic and started on oral prednisone x 5 days  Negative urine legionella/strep  1 out of 2 blood culture are positive for Staph aureus likely contamination,   Continue to observe off antibiotic  Supportive care with nebs and anti-tussives    Erosive esophagitis  Patient with underlying history of severe gastroparesis, GERD  Chronic, associated with barrets esophagus, patient was scheduled for outpatient EGD. Imaging upon arrival showing \"Similar appearance of narrowing of the distal esophagus just proximal to the GE junction. Fluid throughout the esophagus. Recommend endoscopy if not recently performed to assess for the possibility of underlying lesion versus stricture\"  Evaluated by GI  Status post EGD on 12/24 with the finding for medium hiatal hernia and grade D esophagitis in the lower third of the esophagus, cold forceps biopsy " performed  Continue  PPI twice daily with the plan to repeat EGD in 2 to 3 months  Continue with Reglan 3 times daily  Follow on pathology report  Chronic hyponatremia  Thought to be due to psych meds per note review, chronic  Received 2L IVF prior to admission  Improved s/p IVF  Monitor    Elevated lactic acid level  Improved with IV fluid  Hyperlipidemia  Continue with statin  Primary hypertension  Stable BP  Continue amlodipine  Schizoaffective disorder (HCC)  Continue home meds   Gastroparesis  Continue with Reglan      VTE Pharmacologic Prophylaxis: VTE Score: 6 High Risk (Score >/= 5) - Pharmacological DVT Prophylaxis Ordered: enoxaparin (Lovenox). Sequential Compression Devices Ordered.    Mobility:   Basic Mobility Inpatient Raw Score: 20  JH-HLM Goal: 6: Walk 10 steps or more  JH-HLM Achieved: 6: Walk 10 steps or more  JH-HLM Goal achieved. Continue to encourage appropriate mobility.    Patient Centered Rounds: I performed bedside rounds with nursing staff today.   Discussions with Specialists or Other Care Team Provider: Nursing    Education and Discussions with Family / Patient:  Patient.     Current Length of Stay: 2 day(s)  Current Patient Status: Inpatient   Certification Statement: The patient will continue to require additional inpatient hospital stay due to management of hypoxia  Discharge Plan: Anticipate discharge tomorrow to home.    Code Status: Level 1 - Full Code    Subjective   Patient seen and examined  Comfortable in bed  Continue to have feeling of dyspnea and intermittent cough    Objective :  Temp:  [97.8 °F (36.6 °C)] 97.8 °F (36.6 °C)  HR:  [86-97] 86  BP: (119-177)/() 177/139  Resp:  [18] 18  SpO2:  [95 %-98 %] 98 %  O2 Device: Nasal cannula  Nasal Cannula O2 Flow Rate (L/min):  [1 L/min] 1 L/min    Body mass index is 35.2 kg/m².     Input and Output Summary (last 24 hours):     Intake/Output Summary (Last 24 hours) at 12/25/2024 1132  Last data filed at 12/25/2024 1129  Gross  per 24 hour   Intake 222 ml   Output 3800 ml   Net -3578 ml       Physical Exam  Patient is awake alert oriented in no acute distress  Obese ,comfortable in bed  Lung clear to auscultation bilateral with intermittent rhonchi  Heart positive S1-S2 no murmur   Abdomen soft nontender  Lower extremities no edema    Lines/Drains:        Lab Results: I have reviewed the following results:   Results from last 7 days   Lab Units 12/24/24  0454 12/23/24  0643 12/23/24  0159   WBC Thousand/uL 8.44  --  10.11   HEMOGLOBIN g/dL 10.4*   < > 10.7*   HEMATOCRIT % 31.8*   < > 33.2*   PLATELETS Thousands/uL 233  --  215   LYMPHO PCT %  --   --  2*   MONO PCT %  --   --  4   EOS PCT %  --   --  0    < > = values in this interval not displayed.     Results from last 7 days   Lab Units 12/25/24  0446 12/24/24  0454   SODIUM mmol/L 134* 134*   POTASSIUM mmol/L 3.8 4.0   CHLORIDE mmol/L 97 100   CO2 mmol/L 31 30   BUN mg/dL 11 11   CREATININE mg/dL 0.53* 0.61   ANION GAP mmol/L 6 4   CALCIUM mg/dL 9.7 9.2   ALBUMIN g/dL  --  3.8   TOTAL BILIRUBIN mg/dL  --  0.30   ALK PHOS U/L  --  60   ALT U/L  --  19   AST U/L  --  18   GLUCOSE RANDOM mg/dL 88 98     Results from last 7 days   Lab Units 12/23/24  0159   INR  0.94             Results from last 7 days   Lab Units 12/24/24  0454 12/23/24  1048 12/23/24  0643 12/23/24  0422 12/23/24  0159   LACTIC ACID mmol/L  --  2.7* 2.9* 3.3* 2.8*   PROCALCITONIN ng/ml 0.20  --   --   --  <0.05       Recent Cultures (last 7 days):   Results from last 7 days   Lab Units 12/24/24  0329 12/23/24  0159 12/23/24  0135   BLOOD CULTURE   --  No Growth at 48 hrs. Gram Positive Cocci*  Staphylococcus aureus*   GRAM STAIN RESULT   --   --  Gram positive cocci in pairs and chains*   LEGIONELLA URINARY ANTIGEN  Negative  --   --        Imaging Results Review: No pertinent imaging studies reviewed.  Other Study Results Review: No additional pertinent studies reviewed.    Last 24 Hours Medication List:     Current  Facility-Administered Medications:     albuterol inhalation solution 2.5 mg, Q4H PRN    amLODIPine (NORVASC) tablet 5 mg, Daily    benzonatate (TESSALON PERLES) capsule 200 mg, TID PRN    enoxaparin (LOVENOX) subcutaneous injection 40 mg, Daily    levalbuterol (XOPENEX) inhalation solution 1.25 mg, TID    LORazepam (ATIVAN) tablet 2 mg, Q8H PRN    metoclopramide (REGLAN) tablet 5 mg, TID AC    ondansetron (ZOFRAN) injection 4 mg, Q6H PRN    pantoprazole (PROTONIX) EC tablet 40 mg, BID AC    PARoxetine (PAXIL) tablet 30 mg, Daily    predniSONE tablet 40 mg, Daily    QUEtiapine (SEROquel) tablet 400 mg, HS    ziprasidone (GEODON) capsule 80 mg, Daily With Breakfast    Administrative Statements   Today, Patient Was Seen By: Jim Erickson DO      **Please Note: This note may have been constructed using a voice recognition system.**

## 2024-12-25 NOTE — ASSESSMENT & PLAN NOTE
Briefly, patient is a 64-year-old female with a history of severe gastroparesis, GERD, hiatal hernia s/p cTIF in 2021, previous rosas's esophagus who presented with progressive malaise, shortness of breath, cough.  She also reports slightly worsening nausea and vomiting over the last few weeks and was found to have concerns for multiple/aspiration pneumonia on CT chest.  CT abdomen and pelvis showed possible inflammation thickening in the distal esophagus with narrowing and fluid throughout the more proximal esophagus.  Also finding of small hiatal hernia    Differential diagnosis include viral gastroenteritis, worsening gastroparesis,  worsening esophagitis, distal esophageal peptic stricture, structural/functional issue with TIF.    Patient with s/p EGD for evaluation of nausea and vomiting. Findings of esophagitis Vs Barrette's esophagus. Also hiatal hernia and concern of loosening of her previous cTIF.  Distal esophageal biopsies obtained.      Plan  - Tolerating soft diet without nausea or vomiting.  - Okay for discharge from a GI perspective.  Recommend strict gastroparesis diet on discharge  - On discharge please provide a prescription for Prilosec 40 mg p.o. twice daily.  Continue home dose Reglan 5 mg p.o. 3 times daily  - Will arrange for EGD and manometry in 4 weeks.  Will also arrange for outpatient follow-up with one of our motility specialist within 1 month.  She prefers this option instead of following up with Dr. David Cedeño.  - We will reach out with biopsy pathology results  - GI will sign off at this time.  Please do not hesitate to reach out if further questions or issues arise

## 2024-12-25 NOTE — ASSESSMENT & PLAN NOTE
"Patient with underlying history of severe gastroparesis, GERD  Chronic, associated with barrets esophagus, patient was scheduled for outpatient EGD. Imaging upon arrival showing \"Similar appearance of narrowing of the distal esophagus just proximal to the GE junction. Fluid throughout the esophagus. Recommend endoscopy if not recently performed to assess for the possibility of underlying lesion versus stricture\"  Evaluated by GI  Status post EGD on 12/24 with the finding for medium hiatal hernia and grade D esophagitis in the lower third of the esophagus, cold forceps biopsy performed  Continue  PPI twice daily with the plan to repeat EGD in 2 to 3 months  Continue with Reglan 3 times daily  Follow on pathology report  "

## 2024-12-25 NOTE — ASSESSMENT & PLAN NOTE
"Presented to ER with c/o productive cough (duration unclear), new onset SOB, generalized weakness and malaise. Additionally with R sided pleuritic CP.   Noted to require 4L via O2 in ER per HPI   CT CAP w contrast showed \"Faint patchy consolidations throughout the lungs, as described, which may be secondary to aspiration or multifocal pneumonia. Recommend 3-month follow-up noncontrast CT of the chest to assess stability/resolution\"  Started on IV Zosyn,  Evaluated by pulmonology, suspect aspiration pneumonitis  Trend CBC, temp curve and O2 sats  Continue to wean oxygen as able  Improving  "

## 2024-12-25 NOTE — ASSESSMENT & PLAN NOTE
Pneumonia noted on CT vs aspiration pneumonitis  Patient is afebrile, w/o leukocytosis, normal procalcitonin x2  RSV/COVID/influenza is negative  Evaluated by pulmonology suspect aspiration pneumonitis  Patient was taken off antibiotic and started on oral prednisone x 5 days  Negative urine legionella/strep  1 out of 2 blood culture are positive for Staph aureus likely contamination,   Continue to observe off antibiotic  Supportive care with nebs and anti-tussives

## 2024-12-25 NOTE — PROGRESS NOTES
Progress Note - Gastroenterology   Name: Irene Plascencia 64 y.o. female I MRN: [id number]  Unit/Bed#: -01 I Date of Admission: 12/23/2024   Date of Service: 12/25/2024 I Hospital Day: 2    Assessment & Plan  Nausea and vomiting  Briefly, patient is a 64-year-old female with a history of severe gastroparesis, GERD, hiatal hernia s/p cTIF in 2021, previous rosas's esophagus who presented with progressive malaise, shortness of breath, cough.  She also reports slightly worsening nausea and vomiting over the last few weeks and was found to have concerns for multiple/aspiration pneumonia on CT chest.  CT abdomen and pelvis showed possible inflammation thickening in the distal esophagus with narrowing and fluid throughout the more proximal esophagus.  Also finding of small hiatal hernia    Differential diagnosis include viral gastroenteritis, worsening gastroparesis,  worsening esophagitis, distal esophageal peptic stricture, structural/functional issue with TIF.    Patient with s/p EGD for evaluation of nausea and vomiting. Findings of esophagitis Vs Barrette's esophagus. Also hiatal hernia and concern of loosening of her previous cTIF.  Distal esophageal biopsies obtained.      Plan  - Tolerating soft diet without nausea or vomiting.  - Okay for discharge from a GI perspective.  Recommend strict gastroparesis diet on discharge  - On discharge please provide a prescription for Prilosec 40 mg p.o. twice daily.  Continue home dose Reglan 5 mg p.o. 3 times daily  - Will arrange for EGD and manometry in 4 weeks.  Will also arrange for outpatient follow-up with one of our motility specialist within 1 month.  She prefers this option instead of following up with Dr. David Cedeño.  - We will reach out with biopsy pathology results  - GI will sign off at this time.  Please do not hesitate to reach out if further questions or issues arise   Gastroparesis  See plan above  Gastroesophageal reflux disease with  esophagitis  See plan above        Subjective   No acute events overnight.  Denies fever, chills, nausea, vomiting, dysphagia, regurgitation, abdominal pain.  She does report increased cough and phlegm production today.    Objective :  Temp:  [97.8 °F (36.6 °C)] 97.8 °F (36.6 °C)  HR:  [86] 86  BP: (119-177)/() 177/139  Resp:  [18] 18  SpO2:  [95 %-98 %] 98 %  O2 Device: Nasal cannula  Nasal Cannula O2 Flow Rate (L/min):  [1 L/min] 1 L/min    Physical Exam  Vitals and nursing note reviewed.   Constitutional:       General: She is not in acute distress.     Appearance: She is well-developed.   HENT:      Head: Normocephalic and atraumatic.   Eyes:      Conjunctiva/sclera: Conjunctivae normal.   Cardiovascular:      Rate and Rhythm: Normal rate and regular rhythm.      Heart sounds: No murmur heard.  Pulmonary:      Effort: Pulmonary effort is normal. No respiratory distress.      Breath sounds: Normal breath sounds.   Abdominal:      Palpations: Abdomen is soft.      Tenderness: There is no abdominal tenderness.   Musculoskeletal:         General: No swelling.      Cervical back: Neck supple.   Skin:     General: Skin is warm and dry.      Capillary Refill: Capillary refill takes less than 2 seconds.   Neurological:      Mental Status: She is alert.   Psychiatric:         Mood and Affect: Mood normal.           Lab Results: I have reviewed the following results:CBC/BMP:   .     12/25/24  0446   SODIUM 134*   K 3.8   CL 97   CO2 31   BUN 11   CREATININE 0.53*   GLUC 88   MG 2.1        Imaging Results Review: No pertinent imaging studies reviewed.  Other Study Results Review: No additional pertinent studies reviewed.    Stefani Flynn MD  Gastroenterology Fellow, PGY- 4  Available on EPIC Secure Chat  12/25/2024 2:30 PM

## 2024-12-26 VITALS
OXYGEN SATURATION: 91 % | HEIGHT: 62 IN | WEIGHT: 192.46 LBS | BODY MASS INDEX: 35.42 KG/M2 | HEART RATE: 95 BPM | SYSTOLIC BLOOD PRESSURE: 153 MMHG | RESPIRATION RATE: 17 BRPM | TEMPERATURE: 98 F | DIASTOLIC BLOOD PRESSURE: 97 MMHG

## 2024-12-26 PROBLEM — R11.2 NAUSEA AND VOMITING: Status: RESOLVED | Noted: 2021-10-22 | Resolved: 2024-12-26

## 2024-12-26 LAB
ALL TARGETS: NOT DETECTED
BACTERIA BLD CULT: ABNORMAL
BACTERIA SPT RESP CULT: ABNORMAL
BACTERIA SPT RESP CULT: ABNORMAL
GRAM STN SPEC: ABNORMAL

## 2024-12-26 PROCEDURE — 97166 OT EVAL MOD COMPLEX 45 MIN: CPT

## 2024-12-26 PROCEDURE — 94640 AIRWAY INHALATION TREATMENT: CPT

## 2024-12-26 PROCEDURE — 97163 PT EVAL HIGH COMPLEX 45 MIN: CPT

## 2024-12-26 PROCEDURE — 99232 SBSQ HOSP IP/OBS MODERATE 35: CPT | Performed by: INTERNAL MEDICINE

## 2024-12-26 PROCEDURE — 94760 N-INVAS EAR/PLS OXIMETRY 1: CPT

## 2024-12-26 PROCEDURE — 88305 TISSUE EXAM BY PATHOLOGIST: CPT | Performed by: PATHOLOGY

## 2024-12-26 PROCEDURE — 99239 HOSP IP/OBS DSCHRG MGMT >30: CPT | Performed by: INTERNAL MEDICINE

## 2024-12-26 RX ORDER — PREDNISONE 20 MG/1
40 TABLET ORAL DAILY
Qty: 2 TABLET | Refills: 0 | Status: SHIPPED | OUTPATIENT
Start: 2024-12-26 | End: 2024-12-27

## 2024-12-26 RX ORDER — OMEPRAZOLE 40 MG/1
40 CAPSULE, DELAYED RELEASE ORAL 2 TIMES DAILY
Qty: 60 CAPSULE | Refills: 0 | Status: SHIPPED | OUTPATIENT
Start: 2024-12-26

## 2024-12-26 RX ADMIN — ZIPRASIDONE HYDROCHLORIDE 80 MG: 40 CAPSULE ORAL at 08:55

## 2024-12-26 RX ADMIN — LORAZEPAM 2 MG: 1 TABLET ORAL at 08:55

## 2024-12-26 RX ADMIN — PAROXETINE HYDROCHLORIDE 30 MG: 20 TABLET, FILM COATED ORAL at 08:54

## 2024-12-26 RX ADMIN — GUAIFENESIN 600 MG: 600 TABLET, EXTENDED RELEASE ORAL at 08:54

## 2024-12-26 RX ADMIN — PANTOPRAZOLE SODIUM 40 MG: 40 TABLET, DELAYED RELEASE ORAL at 05:43

## 2024-12-26 RX ADMIN — AMLODIPINE BESYLATE 5 MG: 5 TABLET ORAL at 08:53

## 2024-12-26 RX ADMIN — PREDNISONE 40 MG: 20 TABLET ORAL at 08:54

## 2024-12-26 RX ADMIN — ONDANSETRON 4 MG: 2 INJECTION INTRAMUSCULAR; INTRAVENOUS at 09:01

## 2024-12-26 RX ADMIN — METOCLOPRAMIDE 5 MG: 10 TABLET ORAL at 05:43

## 2024-12-26 RX ADMIN — LEVALBUTEROL HYDROCHLORIDE 1.25 MG: 1.25 SOLUTION RESPIRATORY (INHALATION) at 07:34

## 2024-12-26 NOTE — ASSESSMENT & PLAN NOTE
" >>ASSESSMENT AND PLAN FOR EROSIVE ESOPHAGITIS WRITTEN ON 12/26/2024  2:25 PM BY CARLOS PALACIO DO    Patient with underlying history of severe gastroparesis, GERD  Chronic, associated with barrets esophagus, patient was scheduled for outpatient EGD. Imaging upon arrival showing \"Similar appearance of narrowing of the distal esophagus just proximal to the GE junction. Fluid throughout the esophagus. Recommend endoscopy if not recently performed to assess for the possibility of underlying lesion versus stricture\"  Evaluated by GI  Status post EGD on 12/24 with the finding for medium hiatal hernia and grade D esophagitis in the lower third of the esophagus, cold forceps biopsy performed  Continue  PPI twice daily with the plan to repeat EGD in 4 weeks  Continue with Reglan 3 times daily  Follow on pathology report  "

## 2024-12-26 NOTE — CASE MANAGEMENT
Case Management Discharge Planning Note    Patient name Irene Plascencia  Location /-01 MRN [id number]  : 1960 Date 2024       Current Admission Date: 2024  Current Admission Diagnosis:Acute hypoxic respiratory failure (HCC)   Patient Active Problem List    Diagnosis Date Noted Date Diagnosed    Gastroesophageal reflux disease with esophagitis 2024     Leukopenia 2024     Heartburn 2024     Gastroparesis 2024     Elevated vitamin B12 level 2024     Iron deficiency 2024     Acquired absence of other toe(s), unspecified side ([facility]) 2024     Osteomyelitis, unspecified site, unspecified type ([facility]) 2024     Vitamin B12 deficiency (dietary) anemia 2023     Folate deficiency 2023     Neutropenia (HCC) 2023     Post concussion syndrome 2023     Sepsis without acute organ dysfunction ([facility]) 2023     Acute hypoxic respiratory failure ([facility]) 2023     Headache 2023     Nutritional anemia 2023     Stress incontinence 2023     Ulcer of toe, chronic, left, with unspecified severity ([facility]) 2023     Acute encephalopathy 2022     Thrombocytosis 2022     Abdominal pain 2022     Schreiber's esophagus 2022     Microcytic anemia 2022     Self neglect 2022     Pruritus 2022     Upper GI bleed 01/10/2022     S/P tooth extraction 10/22/2021     SIRS (systemic inflammatory response syndrome) ([facility]) 2021     Hyperlipidemia 2021     Word finding difficulty 2021     Hiatal hernia with GERD without esophagitis 2021     Polyp of colon 2021     Melena 2020     Cellulitis of left upper extremity 2020     Erosive esophagitis 2020     Rash 2020     Iron deficiency anemia 2020     Multiple excoriations 2020     Fall 2020     Esophagitis 2020     Problem related to living arrangement 2020      Hypokalemia      NMS (neuroleptic malignant syndrome) 01/03/2020     Elevated lactic acid level 01/03/2020     Preop exam for internal medicine 11/18/2019     Severe iron deficiency anemia  11/14/2019     Chronic bilateral low back pain without sciatica 11/01/2019     Right hip pain 07/15/2019     Primary hypertension 06/12/2019     Dermal hypersensitivity reaction 11/08/2018     Psychiatric disorder 08/21/2018     Schizoaffective disorder (HCC) 08/16/2018     Serotonin syndrome 08/13/2018     Other fatigue 06/19/2018     Spinal stenosis of lumbar region with neurogenic claudication 06/15/2018     Lumbar spondylosis 06/15/2018     T12 compression fracture (HCC) 06/15/2018     Synovial cyst of lumbar facet joint 06/15/2018     Localized osteoporosis with current pathological fracture with routine healing 05/25/2018     Lumbar radiculopathy 03/19/2018     DDD (degenerative disc disease), lumbar 03/19/2018     Spondylolisthesis at L4-L5 level 03/19/2018     Anxiety 10/31/2016     Acquired hypothyroidism 10/31/2016     Multifocal pneumonia 09/25/2016     Chronic hyponatremia 09/25/2016     Constipation 04/10/2014     Bulimia nervosa in remission 03/19/2014       LOS (days): 3  Geometric Mean LOS (GMLOS) (days): 4.6  Days to GMLOS:1.3     OBJECTIVE:  Risk of Unplanned Readmission Score: 30.84         Current admission status: Inpatient   Preferred Pharmacy:   [facility]/pharmacy #[id number] - [place], [place] - [address]  [address] [place] [address]  Phone: [phone] Fax: [phone]    [facility] [place] - [place], [place] - [address]  [address]  [place] [address]  Phone: [phone] Fax: [phone]    Primary Care Provider: Raheem Cain MD    Primary Insurance: [facility] REP  Secondary Insurance: [facility]    DISCHARGE DETAILS:    Discharge planning discussed with:: Patient  Freedom of Choice: Yes     CM contacted family/caregiver?: No-  see comments       IMM Given (Date):: 12/26/24  IMM Given to:: Patient    IMM reviewed with patient, patient agrees with discharge determination.      Additional Comments: CM informed that patient is medically cleared for discharge. CM sent message to pateint's nurse regarding if pateint was appropriate for lyft transport. Nurse informed CM that she is. CM met with patient at bedside and confirmed address. Patient signed transportation waiver form and IMM. [employer] requested for patient.

## 2024-12-26 NOTE — INCIDENTAL FINDINGS
The following findings require follow up:  Radiographic finding   Finding: Faint patchy consolidations throughout the lungs    Follow up required: Chest CT scan   Follow up should be done within 3 month(s)    Please notify the following clinician to assist with the follow up:   Dr. Raheem Cain MD      Incidental finding results were discussed with the Patient by Jim Erickson DO on 12/26/24.   They expressed understanding and all questions answered.

## 2024-12-26 NOTE — PHYSICAL THERAPY NOTE
Physical Therapy Evaluation    Patient's Name: Irene Plascencia    Admitting Diagnosis  Shortness of breath [R06.02]  Hyponatremia [E87.1]  Vomiting [R11.10]  Pneumonia [J18.9]  Hypoxia [R09.02]  Abnormal CT scan [R93.89]  Elevated lactic acid level [R79.89]    Problem List  Patient Active Problem List   Diagnosis    Multifocal pneumonia    Chronic hyponatremia    Lumbar radiculopathy    DDD (degenerative disc disease), lumbar    Spondylolisthesis at L4-L5 level    Localized osteoporosis with current pathological fracture with routine healing    Spinal stenosis of lumbar region with neurogenic claudication    Lumbar spondylosis    T12 compression fracture (HCC)    Synovial cyst of lumbar facet joint    Anxiety    Bulimia nervosa in remission    Constipation    Acquired hypothyroidism    Other fatigue    Serotonin syndrome    Schizoaffective disorder (HCC)    Psychiatric disorder    Dermal hypersensitivity reaction    Primary hypertension    Right hip pain    Chronic bilateral low back pain without sciatica    Severe iron deficiency anemia     Preop exam for internal medicine    NMS (neuroleptic malignant syndrome)    Elevated lactic acid level    Hypokalemia    Fall    Esophagitis    Problem related to living arrangement    Iron deficiency anemia    Multiple excoriations    Rash    Cellulitis of left upper extremity    Erosive esophagitis    Melena    Hiatal hernia with GERD without esophagitis    Polyp of colon    Word finding difficulty    SIRS (systemic inflammatory response syndrome) (HCC)    Hyperlipidemia    S/P tooth extraction    Upper GI bleed    Pruritus    Self neglect    Microcytic anemia    Schreiber's esophagus    Acute encephalopathy    Thrombocytosis    Abdominal pain    Stress incontinence    Ulcer of toe, chronic, left, with unspecified severity (HCC)    Nutritional anemia    Headache    Acute hypoxic respiratory failure (HCC)    Sepsis without acute organ dysfunction (HCC)    Post concussion  syndrome    Neutropenia (HCC)    Vitamin B12 deficiency (dietary) anemia    Folate deficiency    Acquired absence of other toe(s), unspecified side (HCC)    Osteomyelitis, unspecified site, unspecified type (HCC)    Elevated vitamin B12 level    Iron deficiency    Heartburn    Gastroparesis    Leukopenia    Gastroesophageal reflux disease with esophagitis       Past Medical History  Past Medical History:   Diagnosis Date    Anemia     Anxiety     Arthritis     Back pain at L4-L5 level     Schreiber esophagus     Bulimia     Colon polyp     Disease of thyroid gland     hypothyroid    GERD (gastroesophageal reflux disease)     Hearing loss in left ear     History of transfusion     Hyperlipidemia     Hypertension     Hypothyroidism     Leukopenia     NMS (neuroleptic malignant syndrome) 01/03/2020    Pneumonia     RA (rheumatoid arthritis) (HCC)     in feet    Sciatica     Seizure (HCC)     due to medication mix up 8/2018 only one historically    Skin spots, red     lower right arm - poss from cat- no s/s infection    Synovial cyst of lumbar spine     Vertigo     Wears dentures     full set    Weight gain        Past Surgical History  Past Surgical History:   Procedure Laterality Date    BONE MARROW BIOPSY      BREAST SURGERY      enlargement with implants    CLOSED REDUCTION DISTAL FEMUR FRACTURE      COLONOSCOPY      COSMETIC SURGERY      DENTAL SURGERY      HIP ARTHROPLASTY Right 01/02/2020    Procedure: REVISION TOTAL HIP ARTHROPLASTY WITH REPAIR OF PARIPROSTHETIC FRACTURE - POSTERIOR APPROACH;  Surgeon: Dilan Pedersen MD;  Location:  MAIN OR;  Service: Orthopedics    DE AMPUTATION METATARSAL W/TOE SINGLE Left 04/07/2023    Procedure: AMPUTATION TOE 4th;  Surgeon: Conner Bartlett DPM;  Location:  MAIN OR;  Service: Podiatry    DE ARTHRP ACETBLR/PROX FEM PROSTC AGRFT/ALGRFT Right 12/11/2019    Procedure: ARTHROPLASTY HIP TOTAL ANTERIOR;  Surgeon: Dilan Pedersen MD;  Location:  MAIN OR;  Service: Orthopedics     DE ESOPHAGOGASTRODUODENOSCOPY TRANSORAL DIAGNOSTIC N/A 05/11/2021    Procedure: ESOPHAGOGASTRODUODENOSCOPY (EGD);  Surgeon: David Cedeño MD;  Location: [facility];  Service: General    DE LAPS RPR PARAESPHGL HRNA INCL FUNDPLSTY W/MESH N/A 05/11/2021    Procedure: REPAIR HERNIA PARAESOPHAGEAL  LAPAROSCOPIC;  Surgeon: David Cedeño MD;  Location: [facility];  Service: General    DE UNLISTED PROCEDURE ESOPHAGUS N/A 05/11/2021    Procedure: FUNDOPLICATION TRANSORAL INCISIONLESS;  Surgeon: Brad Cadet MD;  Location[facility];  Service: Gastroenterology    RHINOPLASTY      SINUS SURGERY      TOE AMPUTATION Left     2023 4th toe    TRANSORAL INCISIONLESS FUNDOPLICATION (TIF)  05/11/2021 12/26/24 0830   PT Last Visit   PT Visit Date 12/26/24   Note Type   Note type Evaluation   Pain Assessment   Pain Assessment Tool 0-10   Pain Score 2   Pain Location/Orientation Location: Abdomen;Location: Generalized   Patient's Stated Pain Goal No pain   Hospital Pain Intervention(s) Ambulation/increased activity   Restrictions/Precautions   Weight Bearing Precautions Per Order No   Other Precautions Bed Alarm;Chair Alarm;Multiple lines;Telemetry;Pain  (bed alarm re-armed post session)   Home Living   Type of Home House   Additional Comments Resides alone in 1 [place] home, 2 [facility].  Indep self care.  ambulates w/ out device, but does own DME   Prior Function   Level of [place] Independent with ADLs;Independent with functional mobility   Falls in the last 6 months 1 to 4   General   Family/Caregiver Present No   Cognition   Overall Cognitive Status WFL   Arousal/Participation Responsive   Attention Attends with cues to redirect   Orientation Level Oriented X4   Memory Unable to assess   Following Commands Follows one step commands without difficulty   Subjective   Subjective states she feels better overall, some pain, cough   RLE Assessment   RLE Assessment   (strength grossly 4-/5- assessed w/ mobility)   LLE  Assessment   LLE Assessment   (strength grossly 4-/5- assessed w/ mobility)   Coordination   Movements are Fluid and Coordinated 1   Bed Mobility   Supine to Sit 5  Supervision   Additional items Assist x 1;Increased time required;Verbal cues   Sit to Supine 5  Supervision   Additional items Assist x 1;Increased time required;Verbal cues   Transfers   Sit to Stand 5  Supervision   Additional items Assist x 1;Increased time required;Verbal cues   Stand to Sit 5  Supervision   Additional items Assist x 1;Increased time required;Verbal cues   Ambulation/Elevation   Gait pattern   (slow, mild lateral sway)   Gait Assistance 5  Supervision   Additional items Assist x 1   Assistive Device None   Distance 150'   Balance   Static Sitting Good   Dynamic Sitting Fair +   Static Standing Fair +   Dynamic Standing Fair +   Ambulatory Fair   Activity Tolerance   Activity Tolerance Patient tolerated treatment well;Treatment limited secondary to medical complications (Comment)   Nurse Made Aware yes   Assessment   Prognosis Good   Assessment Pt seen for physical therapy evaluation.  Pt is a 65 y/o female w/ history/comorbidities of dysphagia, gastroparesis, HTN, schizoaffective d/o, iron deficiency anemia, RA, seziures who is now admitted w/ cough, nausea.  Dx include acute hypoxic respiratory failure, B aspiration pneumonia.  Pt underwent EGD 12/24/24.  Due to acute medical issues, acute procedure, pain, fall risk, note unstable clinical picture.  PT consulted to assess mobility, d/c needs.  At present time, despite acute medical issues, note pt to be functioning close to or at mobility baseline.  No continued skilled acute care PT needs identified.  will sign off.  Pt may mobilize vs w/ MS7 nursing S while here, and may d/c home once medically stable w/ no post acute care therapy needs.   Goals   PT Treatment Day 0   Plan   PT Frequency   (d/c PT)   Discharge Recommendation   Rehab Resource Intensity Level, PT No post-acute  rehabilitation needs   AM-PAC Basic Mobility Inpatient   Turning in Flat Bed Without Bedrails 4   Lying on Back to Sitting on Edge of Flat Bed Without Bedrails 4   Moving Bed to Chair 3   Standing Up From Chair Using Arms 4   Walk in Room 3   Climb 3-5 Stairs With Railing 3   Basic Mobility Inpatient Raw Score 21   Basic Mobility Standardized Score 45.55   [facility] Highest Level Of Mobility   -HLM Goal 6: Walk 10 steps or more   JH-HLM Achieved 7: Walk 25 feet or more             Moshe Collins PT, DPT, CSRS

## 2024-12-26 NOTE — ASSESSMENT & PLAN NOTE
"Patient with underlying history of severe gastroparesis, GERD  Chronic, associated with barrets esophagus, patient was scheduled for outpatient EGD. Imaging upon arrival showing \"Similar appearance of narrowing of the distal esophagus just proximal to the GE junction. Fluid throughout the esophagus. Recommend endoscopy if not recently performed to assess for the possibility of underlying lesion versus stricture\"  Evaluated by GI  Status post EGD on 12/24 with the finding for medium hiatal hernia and grade D esophagitis in the lower third of the esophagus, cold forceps biopsy performed  Continue  PPI twice daily with the plan to repeat EGD in 4 weeks  Continue with Reglan 3 times daily  Follow on pathology report  "

## 2024-12-26 NOTE — ASSESSMENT & PLAN NOTE
"Presented to ER with c/o productive cough (duration unclear), new onset SOB, generalized weakness and malaise. Additionally with R sided pleuritic CP.  Ddx: Pneumonia versus aspiration pneumonitis/pneumonia   Patient is afebrile, w/o leukocytosis, normal procalcitonin  Leaning towards pneumonitis the patient has episodes of similar admissions for same complaint going back to August  EGD showed- \"grade D esophagitis in the lower third of the esophagus, medium hiatal hernia.  And fluid throughout the esophagus\"  GI recommends continuing PPI, and repeat EGD in 2 to 3 months  Continue with Reglan 3 times daily  Follow-up on pathology  RSV/COVID/influenza is negative  Sputum culture - 1x epithelial cells, rare gram positive rods, rare gram positive coccis in pairs  BC-1 - gram-positive cocci, most likely contamination  BC-2 - negative  Urine Strep - negative, Urine Legionella - negative, MRSA - negative  Supportive care with nebs and anti-tussives     "

## 2024-12-26 NOTE — ASSESSMENT & PLAN NOTE
Pneumonia noted on CT vs aspiration pneumonitis  Patient is afebrile, w/o leukocytosis, normal procalcitonin x2  RSV/COVID/influenza is negative  Evaluated by pulmonology suspect aspiration pneumonitis  Patient was taken off antibiotic and started on oral prednisone x 5 days  Negative urine legionella/strep  1 out of 2 blood culture are positive for Staph aureus likely contamination,   Continue to observe off antibiotic  Continue prednisone course

## 2024-12-26 NOTE — OCCUPATIONAL THERAPY NOTE
Occupational Therapy Evaluation     Patient Name: Irene Plascencia  Today's Date: 12/26/2024  Problem List  Principal Problem:    Acute hypoxic respiratory failure (HCC)  Active Problems:    Multifocal pneumonia    Chronic hyponatremia    Schizoaffective disorder (HCC)    Primary hypertension    Elevated lactic acid level    Erosive esophagitis    Hyperlipidemia    Gastroparesis    Gastroesophageal reflux disease with esophagitis    Past Medical History  Past Medical History:   Diagnosis Date    Anemia     Anxiety     Arthritis     Back pain at L4-L5 level     Schreiber esophagus     Bulimia     Colon polyp     Disease of thyroid gland     hypothyroid    GERD (gastroesophageal reflux disease)     Hearing loss in left ear     History of transfusion     Hyperlipidemia     Hypertension     Hypothyroidism     Leukopenia     NMS (neuroleptic malignant syndrome) 01/03/2020    Pneumonia     RA (rheumatoid arthritis) (HCC)     in feet    Sciatica     Seizure ([facility])     due to medication mix up 8/2018 only one historically    Skin spots, red     lower right arm - poss from cat- no s/s infection    Synovial cyst of lumbar spine     Vertigo     Wears dentures     full set    Weight gain      Past Surgical History  Past Surgical History:   Procedure Laterality Date    BONE MARROW BIOPSY      BREAST SURGERY      enlargement with implants    CLOSED REDUCTION DISTAL FEMUR FRACTURE      COLONOSCOPY      COSMETIC SURGERY      DENTAL SURGERY      HIP ARTHROPLASTY Right 01/02/2020    Procedure: REVISION TOTAL HIP ARTHROPLASTY WITH REPAIR OF PARIPROSTHETIC FRACTURE - POSTERIOR APPROACH;  Surgeon: Dilan Pedersen MD;  Location:  MAIN OR;  Service: Orthopedics    OR AMPUTATION METATARSAL W/TOE SINGLE Left 04/07/2023    Procedure: AMPUTATION TOE 4th;  Surgeon: Conner Bartlett DPM;  Location:  MAIN OR;  Service: Podiatry    OR ARTHRP ACETBLR/PROX FEM PROSTC AGRFT/ALGRFT Right 12/11/2019    Procedure: ARTHROPLASTY HIP TOTAL  ANTERIOR;  Surgeon: Dilan Pedersen MD;  Location: [facility];  Service: Orthopedics    NJ ESOPHAGOGASTRODUODENOSCOPY TRANSORAL DIAGNOSTIC N/A 05/11/2021    Procedure: ESOPHAGOGASTRODUODENOSCOPY (EGD);  Surgeon: David Cedeño MD;  Location: [facility];  Service: General    NJ LAPS RPR PARAESPHGL HRNA INCL FUNDPLSTY W/MESH N/A 05/11/2021    Procedure: REPAIR HERNIA PARAESOPHAGEAL  LAPAROSCOPIC;  Surgeon: David Cedeño MD;  Location: [facility];  Service: General    NJ UNLISTED PROCEDURE ESOPHAGUS N/A 05/11/2021    Procedure: FUNDOPLICATION TRANSORAL INCISIONLESS;  Surgeon: Brad Cadet MD;  Location: [facility];  Service: Gastroenterology    RHINOPLASTY      SINUS SURGERY      TOE AMPUTATION Left     2023 4th toe    TRANSORAL INCISIONLESS FUNDOPLICATION (TIF)  05/11/2021 12/26/24 0918   OT Last Visit   OT Visit Date 12/26/24   Note Type   Note type Evaluation   Pain Assessment   Pain Assessment Tool 0-10   Pain Score No Pain   Restrictions/Precautions   Weight Bearing Precautions Per Order No   Other Precautions Chair Alarm;Bed Alarm;Multiple lines;Fall Risk   Home Living   Type of Home House   Home Layout One level;Performs ADLs on one level;Stairs to enter with rails  (2STE)   Bathroom Shower/Tub Tub/shower unit   Bathroom Toilet Raised   Bathroom Equipment Grab bars in shower   Home Equipment Walker;Cane  (not used PTA)   Additional Comments Pt reports living alone in 1SH w/ 2STE. Pt is I w/ functional mobility without AD   Prior Function   Level of [place] Independent with ADLs;Independent with functional mobility;Independent with IADLS   Lives With Alone   Receives Help From Other (Comment)  (self)   IADLs Independent with driving;Independent with meal prep;Independent with medication management   Falls in the last 6 months 1 to 4  (2 falls)   Vocational Retired   Comments Pt reports being I w/ ADLs and IADLs. Does have a cleaning service every other week. (+) .   Lifestyle   Autonomy PTA,  "Pt reports I w/ ADLs, IADLs, functional mobility   Reciprocal Relationships Lives alone   Service to Others Retired professor   General   Family/Caregiver Present No   Subjective   Subjective \"I do all of that\"   ADL   Where Assessed Edge of bed   Eating Assistance 7  Independent   Grooming Assistance 7  Independent   UB Bathing Assistance 7  Independent   LB Bathing Assistance 5  Supervision/Setup   UB Dressing Assistance 7  Independent   LB Dressing Assistance 5  Supervision/Setup   Toileting Assistance  5  Supervision/Setup   Functional Assistance 5  Supervision/Setup   Bed Mobility   Supine to Sit 7  Independent   Sit to Supine 7  Independent   Additional Comments Pt greeted and left supine in bed w/ alarm on and all needs within reach   Transfers   Sit to Stand 5  Supervision   Additional items Increased time required;Verbal cues   Stand to Sit 5  Supervision   Additional items Increased time required;Verbal cues   Additional Comments no AD   Functional Mobility   Functional Mobility 5  Supervision   Additional Comments Pt completes long household distance mobility w/ supervision, no AD   Balance   Static Sitting Good   Dynamic Sitting Fair +   Static Standing Fair   Dynamic Standing Fair -   Ambulatory Fair -   Activity Tolerance   Activity Tolerance Patient tolerated treatment well   Nurse Made Aware RN cleared   RUE Assessment   RUE Assessment WFL   LUE Assessment   LUE Assessment WFL   Hand Function   Gross Motor Coordination Functional   Fine Motor Coordination Functional   Cognition   Arousal/Participation Alert;Responsive;Cooperative   Attention Within functional limits   Orientation Level Oriented X4   Memory Within functional limits   Following Commands Follows one step commands without difficulty   Comments Pt is pleasant and cooperative. Pt w/ hx of schizoaffective disorder, appears to be at her baseline. Overall fair safety awareness and insight.   Assessment   Prognosis Good   Assessment Pt is a 64 " y.o. female seen for OT evaluation s/p admission to [facility] on 12/23/2024 due to coughing and SOB. Pt diagnosed with Acute hypoxic respiratory failure (HCC). Pt  has a past medical history of Anemia, Anxiety, Arthritis, Back pain at L4-L5 level, Schreiber esophagus, Bulimia, Colon polyp, Disease of thyroid gland, GERD (gastroesophageal reflux disease), Hearing loss in left ear, History of transfusion, Hyperlipidemia, Hypertension, Hypothyroidism, Leukopenia, NMS (neuroleptic malignant syndrome), Pneumonia, RA (rheumatoid arthritis) (HCC), Sciatica, Seizure (HCC), Skin spots, red, Synovial cyst of lumbar spine, Vertigo, Wears dentures, and Weight gain. Pt with active OT evaluation/treatment and activity orders. Pt reports living alone in 1SH w/ 2STE. PTA, Pt was Independent w/ ADL/IADL and functional mobility, was driving and was not using DME at baseline. Pt agreeable and willing to participate in OT evaluation. Pt was greeted and left supine in bed w/ alarm activated and all needs within reach. During evaluation, pt is Independent/Supervision overall. Pt likely at/close to functional baseline. Pt states no OT concerns for return home. The patient's raw score on the AM-PAC Daily Activity Inpatient Short Form is 21. A raw score of greater than or equal to 19 suggests the patient may benefit from discharge to home. Please refer to the recommendation of the Occupational Therapist for safe discharge planning. No further acute OT needs identified at this time. Recommend continued active ADL participation and mobilization with hospital staff while in the hospital to increase pt’s endurance and strength upon D/C. From OT standpoint, recommend D/C to home with family support when medically cleared. D/C pt from OT caseload at this time.   Goals   Patient Goals to go home   Plan   OT Frequency Eval only  (d/c OT)   Discharge Recommendation   Rehab Resource Intensity Level, OT No post-acute rehabilitation needs    AM-PAC Daily Activity Inpatient   Lower Body Dressing 3   Bathing 3   Toileting 3   Upper Body Dressing 4   Grooming 4   Eating 4   Daily Activity Raw Score 21   Daily Activity Standardized Score (Calc for Raw Score >=11) 44.27   AM-PAC Applied Cognition Inpatient   Following a Speech/Presentation 4   Understanding Ordinary Conversation 4   Taking Medications 4   Remembering Where Things Are Placed or Put Away 4   Remembering List of 4-5 Errands 4   Taking Care of Complicated Tasks 4   Applied Cognition Raw Score 24   Applied Cognition Standardized Score 62.21   End of Consult   Education Provided Yes   Patient Position at End of Consult Supine;Bed/Chair alarm activated;All needs within reach   Nurse Communication Nurse aware of consult     DEVYN Shaw, OTR/L

## 2024-12-26 NOTE — ASSESSMENT & PLAN NOTE
"Presented to ER with c/o productive cough (duration unclear), new onset SOB, generalized weakness and malaise. Additionally with R sided pleuritic CP.   Noted to require 4L via O2 in ER per HPI   CT CAP w contrast showed \"Faint patchy consolidations throughout the lungs, as described, which may be secondary to aspiration or multifocal pneumonia. Recommend 3-month follow-up noncontrast CT of the chest to assess stability/resolution\"  Started on IV Zosyn,  Evaluated by pulmonology, suspect aspiration pneumonitis  Currently on room air  "

## 2024-12-26 NOTE — DISCHARGE SUMMARY
"Discharge Summary - Hospitalist   Name: Irene Plascencia 64 y.o. female I MRN: [id number]  Unit/Bed#: -01 I Date of Admission: 12/23/2024   Date of Service: 12/26/2024 I Hospital Day: 3     Assessment & Plan  Acute hypoxic respiratory failure (HCC)  Presented to ER with c/o productive cough (duration unclear), new onset SOB, generalized weakness and malaise. Additionally with R sided pleuritic CP.   Noted to require 4L via O2 in ER per HPI   CT CAP w contrast showed \"Faint patchy consolidations throughout the lungs, as described, which may be secondary to aspiration or multifocal pneumonia. Recommend 3-month follow-up noncontrast CT of the chest to assess stability/resolution\"  Started on IV Zosyn,  Evaluated by pulmonology, suspect aspiration pneumonitis  Currently on room air  Multifocal pneumonia  Pneumonia noted on CT vs aspiration pneumonitis  Patient is afebrile, w/o leukocytosis, normal procalcitonin x2  RSV/COVID/influenza is negative  Evaluated by pulmonology suspect aspiration pneumonitis  Patient was taken off antibiotic and started on oral prednisone x 5 days  Negative urine legionella/strep  1 out of 2 blood culture are positive for Staph aureus likely contamination,   Continue to observe off antibiotic  Continue prednisone course    Erosive esophagitis  Patient with underlying history of severe gastroparesis, GERD  Chronic, associated with barrets esophagus, patient was scheduled for outpatient EGD. Imaging upon arrival showing \"Similar appearance of narrowing of the distal esophagus just proximal to the GE junction. Fluid throughout the esophagus. Recommend endoscopy if not recently performed to assess for the possibility of underlying lesion versus stricture\"  Evaluated by GI  Status post EGD on 12/24 with the finding for medium hiatal hernia and grade D esophagitis in the lower third of the esophagus, cold forceps biopsy performed  Continue  PPI twice daily with the plan to repeat EGD in 4 " weeks  Continue with Reglan 3 times daily  Follow on pathology report  Chronic hyponatremia  Thought to be due to psych meds per note review, chronic  Received 2L IVF prior to admission  Improved s/p IVF  Monitor    Elevated lactic acid level  Improved with IV fluid  Hyperlipidemia  Continue with statin  Primary hypertension  Stable BP  Continue amlodipine  Schizoaffective disorder (HCC)  Continue home meds   Gastroparesis  Continue with Reglan       Medical Problems       Resolved Problems  Date Reviewed: 12/26/2024          Resolved    Nausea and vomiting 12/26/2024     Resolved by  Jim Erickson DO        Discharging Physician / Practitioner: Jim Erickson DO  PCP: Raheem Cain MD  Admission Date:   Admission Orders (From admission, onward)       Ordered        12/23/24 0433  INPATIENT ADMISSION  Once                          Discharge Date: 12/26/24    Consultations During Hospital Stay:  GI  Pulmonology    Procedures Performed:   EGD  medium hiatal hernia and grade D esophagitis in the lower third of the esophagus, cold forceps biopsy performed     CT chest abdomen pelvis  Faint patchy consolidations throughout the lungs, as described, which may be secondary to aspiration or multifocal pneumonia. Recommend 3-month follow-up noncontrast CT of the chest to assess stability/resolution.   Similar appearance of narrowing of the distal esophagus just proximal to the GE junction. Fluid throughout the esophagus. Recommend endoscopy if not recently performed to assess for the possibility of underlying lesion versus stricture  Significant Findings / Test Results:   As above    Incidental Findings:   Faint patchy consolidation throughout the lungs  I reviewed the above mentioned incidental findings with the patient and/or family and they expressed understanding.    Test Results Pending at Discharge (will require follow up):   Esophagus biopsy     Outpatient Tests Requested:  GI follow-up    Complications:   "none    Reason for Admission:   Aspiration pneumonitis  Acute hypoxic respiratory failure  Erosive esophagitis    Hospital Course:   Irene Plascencia is a 64 y.o. female patient who originally presented to the hospital on 12/23/2024 due to cough and nausea  Patient with a PMH of chronic dysphagia, severe gastroparesis, hypertension, schizoaffective disorder, esophagitis, hiatal hernia, chronic iron deficiency anemia presenting with complaints of cough and generalized malaise for 2 days.   Cough is productive of yellowish to greenish sputum and has now become constant  It is associated with shortness of breath, generalized weakness.  .   In ER,  Temp 97.6F, /68, 02 sat 97% on 4L 02.  Labs notable for Na 127, Lactic acid 2.8 > 3.3, procalcitonin < 0.05, wbc 10.11  Hemoglobin 10.7     CT chest \"Faint patchy consolidations throughout the lungs, as described, which may be secondary to aspiration or multifocal pneumonia. Recommend 3-month follow-up noncontrast CT of the chest to assess stability/resolution.   Similar appearance of narrowing of the distal esophagus just proximal to the GE junction. Fluid throughout the esophagus. Recommend endoscopy if not recently performed to assess for the possibility of underlying lesion versus stricture\"    Patient was admitted to the hospital evaluated by pulmonology suspect aspiration pneumonitis, she was treated with bronchodilator and oral steroids  Patient was also evaluated by GI given underlying history of severe gastroparesis, GERD and abnormal CT scan finding  She underwent EGD  with above finding    Patient clinically improving  GI recommending to be discharged on Prilosec twice daily and continue with Reglan with the plan for outpatient GI follow-up to repeat EGD in 4 weeks    Outpatient PCP follow-up      Please see above list of diagnoses and related plan for additional information.     Condition at Discharge: stable    Discharge Day Visit / Exam: " "  Subjective:    Patient seen and examined  Comfortable in bed   Clinically improving  No event overnight  Currently on room air  Vitals: Blood Pressure: 153/97 (12/26/24 0734)  Pulse: 95 (12/26/24 0734)  Temperature: 98 °F (36.7 °C) (12/26/24 0734)  Temp Source: Oral (12/25/24 2208)  Respirations: 17 (12/25/24 2208)  Height: 5' 2\" (157.5 cm) (12/23/24 0523)  Weight - Scale: 87.3 kg (192 lb 7.4 oz) (12/23/24 0523)  SpO2: 91 % (12/26/24 0734)  Physical Exam   Patient is awake alert oriented in no acute distress  Obese ,comfortable in bed  Lung clear to auscultation bilateral  Heart positive S1-S2 no murmur   Abdomen soft nontender  Lower extremities no edema    Discussion with Family:  Patient.     Discharge instructions/Information to patient and family:   See after visit summary for information provided to patient and family.      Provisions for Follow-Up Care:  See after visit summary for information related to follow-up care and any pertinent home health orders.      Mobility at time of Discharge:   Basic Mobility Inpatient Raw Score: 21  JH-HLM Goal: 6: Walk 10 steps or more  JH-HLM Achieved: 7: Walk 25 feet or more  HLM Goal achieved. Continue to encourage appropriate mobility.     Disposition:   Home    Planned Readmission: no    Discharge Medications:  See after visit summary for reconciled discharge medications provided to patient and/or family.      Administrative Statements   Discharge Statement:  I have spent a total time of 35 minutes in caring for this patient on the day of the visit/encounter. >30 minutes of time was spent on: Counseling / Coordination of care, Documenting in the medical record, Reviewing / ordering tests, medicine, procedures  , and Communicating with other healthcare professionals .    **Please Note: This note may have been constructed using a voice recognition system**  "

## 2024-12-26 NOTE — PLAN OF CARE
Problem: PAIN - ADULT  Goal: Verbalizes/displays adequate comfort level or baseline comfort level  Description: Interventions:  - Encourage patient to monitor pain and request assistance  - Assess pain using appropriate pain scale  - Administer analgesics based on type and severity of pain and evaluate response  - Implement non-pharmacological measures as appropriate and evaluate response  - Consider cultural and social influences on pain and pain management  - Notify physician/advanced practitioner if interventions unsuccessful or patient reports new pain  Outcome: Progressing     Problem: INFECTION - ADULT  Goal: Absence or prevention of progression during hospitalization  Description: INTERVENTIONS:  - Assess and monitor for signs and symptoms of infection  - Monitor lab/diagnostic results  - Monitor all insertion sites, i.e. indwelling lines, tubes, and drains  - Monitor endotracheal if appropriate and nasal secretions for changes in amount and color  - Justice appropriate cooling/warming therapies per order  - Administer medications as ordered  - Instruct and encourage patient and family to use good hand hygiene technique  - Identify and instruct in appropriate isolation precautions for identified infection/condition  Outcome: Progressing     Problem: SAFETY ADULT  Goal: Patient will remain free of falls  Description: INTERVENTIONS:  - Educate patient/family on patient safety including physical limitations  - Instruct patient to call for assistance with activity   - Consult OT/PT to assist with strengthening/mobility   - Keep Call bell within reach  - Keep bed low and locked with side rails adjusted as appropriate  - Keep care items and personal belongings within reach  - Initiate and maintain comfort rounds  - Make Fall Risk Sign visible to staff  - Offer Toileting every 2 Hours, in advance of need  - Initiate/Maintain bed alarm  - Apply yellow socks and bracelet for high fall risk patients  - Consider  moving patient to room near nurses station  Outcome: Progressing       Problem: GASTROINTESTINAL - ADULT  Goal: Maintains or returns to baseline bowel function  Description: INTERVENTIONS:  - Assess bowel function  - Encourage oral fluids to ensure adequate hydration  - Administer IV fluids if ordered to ensure adequate hydration  - Administer ordered medications as needed  - Encourage mobilization and activity  - Consider nutritional services referral to assist patient with adequate nutrition and appropriate food choices  Outcome: Progressing     Problem: GENITOURINARY - ADULT  Goal: Absence of urinary retention  Description: INTERVENTIONS:  - Assess patient’s ability to void and empty bladder  - Monitor I/O  - Bladder scan as needed  - Discuss with physician/AP medications to alleviate retention as needed  - Discuss catheterization for long term situations as appropriate  Outcome: Progressing

## 2024-12-26 NOTE — ASSESSMENT & PLAN NOTE
"Presented to ER with c/o productive cough (duration unclear), new onset SOB, generalized weakness and malaise. Additionally with R sided pleuritic CP.   Ddx: Pneumonitis versus pneumonia  New O2 requirement of up to 4 L, now on room air  Titrate as needed to keep SpO2 greater 92%  CT CAP w contrast showed \"Faint patchy consolidations throughout the lungs, as described, which may be secondary to aspiration or multifocal pneumonia. Recommend 3-month follow-up noncontrast CT of the chest to assess stability/resolution\"    "

## 2024-12-26 NOTE — PROGRESS NOTES
"Progress Note - Pulmonology   Name: Irene Plascencia 64 y.o. female I MRN: [id number]  Unit/Bed#: -01 I Date of Admission: 12/23/2024   Date of Service: 12/26/2024 I Hospital Day: 3     Assessment & Plan  Acute hypoxic respiratory failure (HCC)  Presented to ER with c/o productive cough (duration unclear), new onset SOB, generalized weakness and malaise. Additionally with R sided pleuritic CP.   Ddx: Pneumonitis versus pneumonia  New O2 requirement of up to 4 L, now on room air  Titrate as needed to keep SpO2 greater 92%  CT CAP w contrast showed \"Faint patchy consolidations throughout the lungs, as described, which may be secondary to aspiration or multifocal pneumonia. Recommend 3-month follow-up noncontrast CT of the chest to assess stability/resolution\"    Multifocal pneumonia  Presented to ER with c/o productive cough (duration unclear), new onset SOB, generalized weakness and malaise. Additionally with R sided pleuritic CP.  Ddx: Pneumonia versus aspiration pneumonitis/pneumonia   Patient is afebrile, w/o leukocytosis, normal procalcitonin  Leaning towards pneumonitis the patient has episodes of similar admissions for same complaint going back to August  EGD showed- \"grade D esophagitis in the lower third of the esophagus, medium hiatal hernia.  And fluid throughout the esophagus\"  GI recommends continuing PPI, and repeat EGD in 2 to 3 months  Continue with Reglan 3 times daily  Follow-up on pathology  RSV/COVID/influenza is negative  Sputum culture - 1x epithelial cells, rare gram positive rods, rare gram positive coccis in pairs  BC-1 - gram-positive cocci, most likely contamination  BC-2 - negative  Urine Strep - negative, Urine Legionella - negative, MRSA - negative  Supportive care with nebs and anti-tussives     Plan:  Continue to monitor off antibiotics   Continue prednisone taper  Continue supportive measures with nebs and antitussives    24 Hour Events : Patient was afebrile and " hemodynamically stable over the past 24 hours, oxygen requirement is down to room air.  Subjective : Still complains of cough, increased oral secretions. Denies SOB, VANG, Chest pain or tightness.    Objective :  Temp:  [98 °F (36.7 °C)-98.6 °F (37 °C)] 98 °F (36.7 °C)  HR:  [] 95  BP: (148-155)/(95-98) 153/97  Resp:  [17] 17  SpO2:  [90 %-98 %] 91 %  O2 Device: Nasal cannula  Nasal Cannula O2 Flow Rate (L/min):  [1 L/min-2 L/min] 1 L/min    Review of Systems - History obtained from the patient  General ROS: negative for - chills, fatigue, fever, hot flashes, night sweats, sleep disturbance, weight gain, or weight loss  ENT ROS: negative for - headaches, nasal congestion, nasal discharge, oral lesions, sore throat, or visual changes  Cardiovascular ROS: negative for - chest pain, dyspnea on exertion, edema, irregular heartbeat, murmur, palpitations, rapid heart rate, or shortness of breath  Gastrointestinal ROS: negative for - abdominal pain, blood in stools, change in stools, constipation, diarrhea, gas/bloating, heartburn, hematemesis, melena, or swallowing difficulty/pain     Physical Exam  Vitals reviewed.   Constitutional:       Appearance: She is well-developed and normal weight.      Interventions: She is not intubated.  HENT:      Head: Normocephalic and atraumatic.      Mouth/Throat:      Mouth: Mucous membranes are moist.   Eyes:      Pupils: Pupils are equal, round, and reactive to light.   Cardiovascular:      Rate and Rhythm: Normal rate and regular rhythm.   Pulmonary:      Effort: Pulmonary effort is normal. No tachypnea, bradypnea, accessory muscle usage or respiratory distress. She is not intubated.      Breath sounds: No stridor. Examination of the right-lower field reveals decreased breath sounds. Decreased breath sounds present.   Chest:      Chest wall: No mass, deformity, tenderness, crepitus or edema. There is no dullness to percussion.   Abdominal:      General: Bowel sounds are normal.  "     Palpations: Abdomen is soft. There is no hepatomegaly, splenomegaly or mass.      Tenderness: There is no abdominal tenderness. There is no guarding or rebound.   Musculoskeletal:      Right lower leg: No edema.      Left lower leg: No edema.   Skin:     General: Skin is warm and dry.      Capillary Refill: Capillary refill takes 2 to 3 seconds.      Findings: No erythema.      Nails: There is no clubbing.   Neurological:      General: No focal deficit present.      Mental Status: She is alert.   Psychiatric:         Mood and Affect: Mood normal.         Behavior: Behavior normal.         Lab Results: I have reviewed the following results:CBC/BMP: No new results in last 24 hours. , LFTs: No new results in last 24 hours. , Troponin,BNP:No new results in last 24 hours. , Sputum Culture: No results found for: \"SPUTUMCULTUR\", RSV: No results found for: \"RSV\", FLU: No components found for: \"INFLUENZA\"    Imaging Results Review: I reviewed radiology reports from this admission including: CT abdomen/pelvis.  Other Study Results Review: Other studies reviewed include: EGD        "

## 2024-12-28 LAB — BACTERIA BLD CULT: NORMAL

## 2024-12-30 ENCOUNTER — TELEPHONE (OUTPATIENT)
Dept: GASTROENTEROLOGY | Facility: CLINIC | Age: 64
End: 2024-12-30

## 2024-12-30 NOTE — TELEPHONE ENCOUNTER
----- Message from Reba WILL sent at 12/26/2024  7:39 AM EST -----  Regarding: FW: Outpatient follow up    ----- Message -----  From: Stefani Flynn MD  Sent: 12/25/2024   2:43 PM EST  To: #  Subject: Outpatient follow up                             This patient was seen by the consult service at [facility] and underwent endoscopy.  She will need a repeat EGD with manometry in the next few weeks.  The orders have been placed in epic, plain please help her arrange for this.  I would also like for her to follow-up with Dr. Guerra.  Can you please help arrange for outpatient follow-up with him (preferably after she has had the EGD and manometry study). Thank you      - Alea

## 2024-12-30 NOTE — TELEPHONE ENCOUNTER
Spoke with patient, scheduled repeat EGD with manometry on 2/28/2025 @ [facility] with Dr. Ch. As per patient's request, mailed procedure instructions to pt. Address confirmed. Also scheduled F/U ov on 5/6/25 with Dr. Jovanny Mcgovern (Dr. Guerra in fellow clinic). Placed pt on wait list for sooner appointment with Dr. Guerra.      Scheduled date of repeat EGD with manometry (as of today): 2/28/25  Physician performing EGD with manometry: Dr. ANISA Ch  Location of EGD with manometry: [facility]  Desired bowel prep reviewed with patient: EGD with man\ometry proc instructions mailed to patient as per pt's request  Instructions reviewed with patient by: Reba  Clearances:  N/A

## 2025-01-02 RX ORDER — CELECOXIB 200 MG/1
200 CAPSULE ORAL DAILY
COMMUNITY
Start: 2024-12-28

## 2025-01-03 ENCOUNTER — RESULTS FOLLOW-UP (OUTPATIENT)
Dept: GASTROENTEROLOGY | Facility: CLINIC | Age: 65
End: 2025-01-03

## 2025-01-07 ENCOUNTER — TELEPHONE (OUTPATIENT)
Age: 65
End: 2025-01-07

## 2025-01-07 NOTE — TELEPHONE ENCOUNTER
Patient called to ask if her TCM could be extended tomorrow as she has another issue she wants to address with doctor. After release from hospital her home had no heat for 5 days. Once she began moving around she noticed her feet had gotten very swollen and developed small bumps.    Attempted to transfer to clerical but call dropped.

## 2025-01-08 ENCOUNTER — OFFICE VISIT (OUTPATIENT)
Dept: INTERNAL MEDICINE CLINIC | Facility: CLINIC | Age: 65
End: 2025-01-08
Payer: COMMERCIAL

## 2025-01-08 VITALS
TEMPERATURE: 98.7 F | SYSTOLIC BLOOD PRESSURE: 140 MMHG | OXYGEN SATURATION: 98 % | BODY MASS INDEX: 36.65 KG/M2 | DIASTOLIC BLOOD PRESSURE: 82 MMHG | HEART RATE: 107 BPM | WEIGHT: 200.4 LBS

## 2025-01-08 DIAGNOSIS — K21.00 GASTROESOPHAGEAL REFLUX DISEASE WITH ESOPHAGITIS, UNSPECIFIED WHETHER HEMORRHAGE: ICD-10-CM

## 2025-01-08 DIAGNOSIS — J18.9 MULTIFOCAL PNEUMONIA: ICD-10-CM

## 2025-01-08 DIAGNOSIS — I10 PRIMARY HYPERTENSION: ICD-10-CM

## 2025-01-08 DIAGNOSIS — K22.70 BARRETT'S ESOPHAGUS WITHOUT DYSPLASIA: ICD-10-CM

## 2025-01-08 DIAGNOSIS — K92.1 MELENA: ICD-10-CM

## 2025-01-08 DIAGNOSIS — J96.01 ACUTE HYPOXIC RESPIRATORY FAILURE (HCC): Primary | ICD-10-CM

## 2025-01-08 DIAGNOSIS — K31.84 GASTROPARESIS: ICD-10-CM

## 2025-01-08 DIAGNOSIS — N39.3 STRESS INCONTINENCE: ICD-10-CM

## 2025-01-08 DIAGNOSIS — K20.90 ESOPHAGITIS: ICD-10-CM

## 2025-01-08 PROCEDURE — 99495 TRANSJ CARE MGMT MOD F2F 14D: CPT | Performed by: INTERNAL MEDICINE

## 2025-01-08 NOTE — PATIENT INSTRUCTIONS
Problem List Items Addressed This Visit          Cardiovascular and Mediastinum    Primary hypertension    Borderline, continue meds            Respiratory    Multifocal pneumonia    Felt to be secondary to aspiration pneumonitis, will recheck chest x-ray in a month         Relevant Orders    XR chest pa and lateral    Acute hypoxic respiratory failure (HCC) - Primary    Improved, pulse ox 98%            Digestive    Esophagitis    Continue PPI, and follow-up GI, patient is going to get repeat EGD in 4 weeks         Melena    Patient currently denies any black tarry stool         Schreiber's esophagus    Follow-up with GI for surveillance         Gastroparesis    Continue Reglan, follow-up GI         Gastroesophageal reflux disease with esophagitis    Continue PPI, and follow-up GI, patient is going to get repeat EGD in 4 weeks

## 2025-01-08 NOTE — ASSESSMENT & PLAN NOTE
Felt to be secondary to aspiration pneumonitis, will recheck chest x-ray in a month  Orders:  •  XR chest pa and lateral; Future

## 2025-01-08 NOTE — PROGRESS NOTES
Transition of Care Visit  Name: Irene Plascencia      : 1960      MRN: [id number]  Encounter Provider: Raheem Cain MD  Encounter Date: 2025   [facility]    Assessment & Plan  Acute hypoxic respiratory failure (HCC)  Improved, pulse ox 98%       Esophagitis  Continue PPI, and follow-up GI, patient is going to get repeat EGD in 4 weeks       Gastroesophageal reflux disease with esophagitis, unspecified whether hemorrhage  Continue PPI, and follow-up GI, patient is going to get repeat EGD in 4 weeks       Primary hypertension  Borderline, continue meds       Gastroparesis  Continue Reglan, follow-up GI       Schreiber's esophagus without dysplasia  Follow-up with GI for surveillance       Melena  Patient currently denies any black tarry stool       Multifocal pneumonia  Felt to be secondary to aspiration pneumonitis, will recheck chest x-ray in a month  Orders:  •  XR chest pa and lateral; Future    Stress incontinence  Continue with Keisa's         Depression Screening and Follow-up Plan: Patient was screened for depression during today's encounter. They screened negative with a PHQ-2 score of 0.        History of Present Illness     Transitional Care Management Review:   Irene Plascencia is a 64 y.o. female here for TCM follow up.     During the TCM phone call patient stated:  TCM Call     Date and time call was made  2024  3:22 PM    Hospital care reviewed  Records not available    Patient was hospitialized at  [facility]    Date of Admission  24    Date of discharge  24    Diagnosis  Acute hypoxic respiratory failure (HCC)    Disposition  Home    Were the patients medications reviewed and updated  No    Current Symptoms  None    Dizziness severity  Severe; Moderate    Neausea severity  Mild    Fatigue severity  Severe    Quality Character  Lightheadedness    Episode pattern  Intermittent      TCM Call     Post hospital  issues  None    Should patient be enrolled in anticoag monitoring?  No    Scheduled for follow up?  Yes    Not clinically warranted  Patient readmitted.    Patients specialists  Other (comment)    Other specialists names  John Araujo    Did you obtain your prescribed medications  No    Why were you unable to obtain your medications  need psych appt they will not get her in sooner than next week    Do you need help managing your prescriptions or medications  No    Is transportation to your appointment needed  No    I have advised the patient to call PCP with any new or worsening symptoms  Betsy PALMER    Living Arrangements  Alone    Are you recieving any outpatient services  No    Are you recieving home care services  No    Types of home care services  Nurse visit    Are you using any community resources  No    Current waiver services  No    Have you fallen in the last 12 months  Yes    How many times  2    Interperter language line needed  No    Counseling  Patient        I reviewed patient's recent hospitalization where she presented with cough and nausea.  She was found to have likely aspiration pneumonia, was treated for this.  She also had an EGD which showed esophagitis, possible stricture, and was recommended she follow-up for an EGD in 4 weeks with GI.  She was sent home on an oral steroid taper.  Since home she reports she still has some chest congestion, coughing up some mucus, feels tired, no fevers or chills.  She vomited one time since out of hospital.   No problems with food getting stuck.  Patient has had additional stressor of being without heat for about a week.      Review of Systems   Constitutional:  Positive for fatigue. Negative for chills and fever.   HENT:  Positive for congestion. Negative for nosebleeds, postnasal drip, sore throat and trouble swallowing.    Eyes:  Negative for pain.   Respiratory:  Positive for cough. Negative for chest tightness, shortness of breath and wheezing.     Cardiovascular:  Negative for chest pain, palpitations and leg swelling.   Gastrointestinal:  Negative for abdominal pain, constipation, diarrhea, nausea and vomiting.   Endocrine: Negative for polydipsia and polyuria.   Genitourinary:  Negative for dysuria, flank pain and hematuria.   Musculoskeletal:  Negative for arthralgias.   Skin:  Negative for rash.   Neurological:  Negative for dizziness, tremors, light-headedness and headaches.   Hematological:  Does not bruise/bleed easily.   Psychiatric/Behavioral:  Negative for confusion and dysphoric mood. The patient is not nervous/anxious.      Objective   /82 (BP Location: Left arm, Patient Position: Sitting, Cuff Size: Large)   Pulse (!) 107   Temp 98.7 °F (37.1 °C) (Tympanic)   Wt 90.9 kg (200 lb 6.4 oz)   SpO2 98%   BMI 36.65 kg/m²     Physical Exam  Constitutional:       Appearance: Normal appearance. She is well-developed.   HENT:      Head: Normocephalic and atraumatic.      Right Ear: External ear normal.      Left Ear: External ear normal.      Nose: Nose normal.   Eyes:      General: No scleral icterus.     Conjunctiva/sclera: Conjunctivae normal.   Neck:      Thyroid: No thyromegaly.      Trachea: No tracheal deviation.   Cardiovascular:      Rate and Rhythm: Normal rate and regular rhythm.      Heart sounds: No murmur heard.  Pulmonary:      Effort: No respiratory distress.      Breath sounds: Normal breath sounds. No wheezing or rales.   Musculoskeletal:      Cervical back: Normal range of motion and neck supple.      Right lower leg: Edema (mild) present.      Left lower leg: Edema (mild) present.   Lymphadenopathy:      Cervical: No cervical adenopathy.   Skin:     Coloration: Skin is not jaundiced or pale.   Neurological:      General: No focal deficit present.      Mental Status: She is alert and oriented to person, place, and time.   Psychiatric:         Mood and Affect: Mood normal.         Behavior: Behavior normal.         Thought  Content: Thought content normal.         Judgment: Judgment normal.       Medications have been reviewed by provider in current encounter

## 2025-01-09 ENCOUNTER — NURSE TRIAGE (OUTPATIENT)
Age: 65
End: 2025-01-09

## 2025-01-09 NOTE — TELEPHONE ENCOUNTER
Pt called back to make Dr Cain aware she will not be going to the ED.  She had a telephone appt with her therapist this morning and she has calmed down, and now her breathing is better.  She also said she thinks the tessalon perles helped.  She is planning on keeping her appt with Dr Cain on Monday 1/13/25.  She is interested in having a visiting nurse come in 2-3 days per week.  What needs to be done to get that started?  She can discuss that with Dr Cain on Monday if he would like.

## 2025-01-09 NOTE — TELEPHONE ENCOUNTER
"Reason for Disposition  • Patient sounds very sick or weak to the triager    Answer Assessment - Initial Assessment Questions  1. RESPIRATORY STATUS: \"Describe your breathing?\" (e.g., wheezing, shortness of breath, unable to speak, severe coughing)       Shortness of breath   2. ONSET: \"When did this breathing problem begin?\"       Ongoing, became worse today  3. PATTERN \"Does the difficult breathing come and go, or has it been constant since it started?\"       At rest.   8. CAUSE: \"What do you think is causing the breathing problem?\"       Patient was seen at the office yesterday.   9. OTHER SYMPTOMS: \"Do you have any other symptoms?\" (e.g., chest pain, cough, dizziness, fever, runny nose)      Weakness, nausea, stiff legs, lumps in both calves, difficulty walking.    Protocols used: Breathing Difficulty-Adult-[place]    "

## 2025-01-12 ENCOUNTER — HOSPITAL ENCOUNTER (EMERGENCY)
Facility: HOSPITAL | Age: 65
Discharge: HOME | End: 2025-01-12
Attending: EMERGENCY MEDICINE
Payer: COMMERCIAL

## 2025-01-12 VITALS
OXYGEN SATURATION: 95 % | SYSTOLIC BLOOD PRESSURE: 140 MMHG | RESPIRATION RATE: 18 BRPM | DIASTOLIC BLOOD PRESSURE: 74 MMHG | HEART RATE: 102 BPM | TEMPERATURE: 98.7 F

## 2025-01-12 DIAGNOSIS — R60.0 LOWER EXTREMITY EDEMA: ICD-10-CM

## 2025-01-12 DIAGNOSIS — L03.90 CELLULITIS: Primary | ICD-10-CM

## 2025-01-12 LAB
ATRIAL RATE: 100 BPM
P AXIS: 59 DEGREES
PR INTERVAL: 126 MS
QRS AXIS: 12 DEGREES
QRSD INTERVAL: 76 MS
QT INTERVAL: 346 MS
QTC INTERVAL: 446 MS
T WAVE AXIS: 62 DEGREES
VENTRICULAR RATE: 100 BPM

## 2025-01-12 PROCEDURE — 93010 ELECTROCARDIOGRAM REPORT: CPT | Performed by: INTERNAL MEDICINE

## 2025-01-12 PROCEDURE — 99284 EMERGENCY DEPT VISIT MOD MDM: CPT

## 2025-01-12 PROCEDURE — 93005 ELECTROCARDIOGRAM TRACING: CPT

## 2025-01-12 PROCEDURE — 99284 EMERGENCY DEPT VISIT MOD MDM: CPT | Performed by: EMERGENCY MEDICINE

## 2025-01-12 RX ORDER — CEPHALEXIN 500 MG/1
500 CAPSULE ORAL EVERY 6 HOURS SCHEDULED
Qty: 28 CAPSULE | Refills: 0 | Status: SHIPPED | OUTPATIENT
Start: 2025-01-12 | End: 2025-01-20

## 2025-01-12 RX ORDER — CEPHALEXIN 500 MG/1
500 CAPSULE ORAL ONCE
Status: COMPLETED | OUTPATIENT
Start: 2025-01-12 | End: 2025-01-12

## 2025-01-12 RX ADMIN — CEPHALEXIN 500 MG: 500 CAPSULE ORAL at 05:00

## 2025-01-12 NOTE — ED ATTENDING ATTESTATION
"1/12/2025  I, Ramon Contreras MD, saw and evaluated the patient. I have discussed the patient with the resident/non-physician practitioner and agree with the resident's/non-physician practitioner's findings, Plan of Care, and MDM as documented in the resident's/non-physician practitioner's note, except where noted. All available labs and Radiology studies were reviewed.  I was present for key portions of any procedure(s) performed by the resident/non-physician practitioner and I was immediately available to provide assistance.       At this point I agree with the current assessment done in the Emergency Department.  I have conducted an independent evaluation of this patient a history and physical is as follows:      Final Diagnosis:  1. Cellulitis    2. Lower extremity edema      Chief Complaint   Patient presents with    Leg Swelling     Pt noticed legs swelling last week. Has been mostly sitting in a chair x7 days. Today noticed the legs are worse and \"blotchy.\" Is currently being treated for PNA as well.           A:  -64-year-old female presents with bilateral lower extremity swelling.      P:  -Likely venous stasis skin changes due to dependent edema.  Patient instructed to keep her feet elevated.  Wear compression stockings.  Highly doubt cellulitis.  However, will prescribe 7 days of Keflex.      H:    64-year-old female who presents with bilateral lower extremity swelling worsening over the past week.  States that she has been sleeping in a chair for the past 7 days because she lost her heat.  So, her feet have not been elevated.      PMH:  Past Medical History:   Diagnosis Date    Anemia     Anxiety     Arthritis     Back pain at L4-L5 level     Schreiber esophagus     Bulimia     Colon polyp     Disease of thyroid gland     hypothyroid    GERD (gastroesophageal reflux disease)     Hearing loss in left ear     History of transfusion     Hyperlipidemia     Hypertension     Hypothyroidism     Leukopenia     " NMS (neuroleptic malignant syndrome) 01/03/2020    Pneumonia     RA (rheumatoid arthritis) (HCC)     in feet    Sciatica     Seizure (HCC)     due to medication mix up 8/2018 only one historically    Skin spots, red     lower right arm - poss from cat- no s/s infection    Synovial cyst of lumbar spine     Vertigo     Wears dentures     full set    Weight gain        PSH:  Past Surgical History:   Procedure Laterality Date    BONE MARROW BIOPSY      BREAST SURGERY      enlargement with implants    CLOSED REDUCTION DISTAL FEMUR FRACTURE      COLONOSCOPY      COSMETIC SURGERY      DENTAL SURGERY      HIP ARTHROPLASTY Right 01/02/2020    Procedure: REVISION TOTAL HIP ARTHROPLASTY WITH REPAIR OF PARIPROSTHETIC FRACTURE - POSTERIOR APPROACH;  Surgeon: Dilan Pedersen MD;  Location: [facility];  Service: Orthopedics    IA AMPUTATION METATARSAL W/TOE SINGLE Left 04/07/2023    Procedure: AMPUTATION TOE 4th;  Surgeon: Conner Bartlett DPM;  Location:  MAIN OR;  Service: Podiatry    IA ARTHRP ACETBLR/PROX FEM PROSTC AGRFT/ALGRFT Right 12/11/2019    Procedure: ARTHROPLASTY HIP TOTAL ANTERIOR;  Surgeon: Dilan Pedersen MD;  Location: [facility];  Service: Orthopedics    IA ESOPHAGOGASTRODUODENOSCOPY TRANSORAL DIAGNOSTIC N/A 05/11/2021    Procedure: ESOPHAGOGASTRODUODENOSCOPY (EGD);  Surgeon: David Cedeño MD;  Location: [facility];  Service: General    IA LAPS RPR PARAESPHGL HRNA INCL FUNDPLSTY W/MESH N/A 05/11/2021    Procedure: REPAIR HERNIA PARAESOPHAGEAL  LAPAROSCOPIC;  Surgeon: David Cedeño MD;  Location: [facility];  Service: General    IA UNLISTED PROCEDURE ESOPHAGUS N/A 05/11/2021    Procedure: FUNDOPLICATION TRANSORAL INCISIONLESS;  Surgeon: Brad Cadet MD;  Location: [facility];  Service: Gastroenterology    RHINOPLASTY      SINUS SURGERY      TOE AMPUTATION Left     2023 4th toe    TRANSORAL INCISIONLESS FUNDOPLICATION (TIF)  05/11/2021         PE:   Vitals:    01/12/25 0439 01/12/25 0440   BP: 140/74     Pulse: 102    Resp: 18    Temp:  98.7 °F (37.1 °C)   TempSrc:  Oral   SpO2: 95%          Constitutional: Chronically ill-appearing.   Cardiovascular: Normal rate, regular rhythm.  Pulmonary/Chest: Effort normal.   Abdominal: Soft. Normal appearance.   Neurological: She is alert.   Skin: Skin is warm, dry and intact.   MSK: 1+ pitting edema bilateral lower extremities.  Superficial abrasions to bilateral lower extremities.  Mild erythema to anterior tibial area on the right.  Psychiatric: She has a normal mood and affect. Her speech is normal and behavior is normal. Thought content normal.          - 13 point ROS was performed and all are normal unless stated in the history above.   - Nursing note reviewed. Vitals reviewed.   - Orders placed by myself and/or advanced practitioner / resident.    - Previous chart was reviewed  - No language barrier.   - History obtained from patient.   - There are no limitations to the history obtained. Reasons ROS could not be obtained:  N/A         Medications   cephalexin (KEFLEX) capsule 500 mg (has no administration in time range)     No orders to display     Orders Placed This Encounter   Procedures    ECG 12 lead     Labs Reviewed - No data to display  Time reflects when diagnosis was documented in both MDM as applicable and the Disposition within this note       Time User Action Codes Description Comment    1/12/2025  4:52 AM Tigre Burr [L03.90] Cellulitis     1/12/2025  4:52 AM Tigre Burr [R60.0] Lower extremity edema           ED Disposition       ED Disposition   Discharge    Condition   Stable    Date/Time   Sun Jan 12, 2025  4:54 AM    Comment   Irene Plascencia discharge to home/self care.                   Follow-up Information       Follow up With Specialties Details Why Contact Info    Raheem Cain MD Internal Medicine   [address] [place] [id number]  [phone]            Patient's Medications   Discharge Prescriptions    No  "medications on file     No discharge procedures on file.  Prior to Admission Medications   Prescriptions Last Dose Informant Patient Reported? Taking?   LORazepam (ATIVAN) 2 mg tablet   No No   Sig: Take 1 tablet (2 mg total) by mouth every 6 (six) hours as needed for anxiety Max 5 times a day   PARoxetine (PAXIL) 30 mg tablet   No No   Sig: TAKE 2 TABLETS (60 MG TOTAL) BY MOUTH IN THE MORNING   QUEtiapine (SEROquel XR) 400 mg 24 hr tablet  Self No No   Sig: TAKE 1 TABLET (400 MG TOTAL) BY MOUTH DAILY AT BEDTIME   amLODIPine (NORVASC) 5 mg tablet  Self No No   Sig: TAKE 1 TABLET (5 MG TOTAL) BY MOUTH DAILY.   benzonatate (TESSALON) 200 MG capsule   No No   Sig: Take 1 capsule (200 mg total) by mouth 3 (three) times a day as needed for cough   buPROPion (WELLBUTRIN) 75 mg tablet   Yes No   Sig: Take 75 mg by mouth   celecoxib (CeleBREX) 200 mg capsule   Yes No   Sig: Take 200 mg by mouth daily   levothyroxine 25 mcg tablet  Self No No   Sig: TAKE 1 TABLET BY MOUTH EVERY DAY   loratadine (CLARITIN) 10 mg tablet   No No   Sig: Take 1 tablet (10 mg total) by mouth daily   metoclopramide (REGLAN) 5 mg tablet   No No   Sig: TAKE 1 TABLET BY MOUTH 3 TIMES A DAY BEFORE MEALS.   omeprazole (PriLOSEC) 40 MG capsule   No No   Sig: Take 1 capsule (40 mg total) by mouth 2 (two) times a day   ondansetron (ZOFRAN) 4 mg tablet   No No   Sig: Take 1 tablet (4 mg total) by mouth every 8 (eight) hours as needed for nausea or vomiting   sucralfate (CARAFATE) 1 g tablet   No No   Sig: TAKE 1 TABLET (1 G TOTAL) BY MOUTH 3 (THREE) TIMES A DAY WITH MEALS      Facility-Administered Medications: None       Portions of the record may have been created with voice recognition software. Occasional wrong word or \"sound a like\" substitutions may have occurred due to the inherent limitations of voice recognition software. Read the chart carefully and recognize, using context, where substitutions have occurred.       ED Course         Critical Care " Time  Procedures

## 2025-01-12 NOTE — DISCHARGE INSTRUCTIONS
You are seen in the emergency department for leg swelling and redness.  We feel this is consistent with a mild case of cellulitis.  We are prescribing you an antibiotic to be taken as prescribed.  Please follow-up with your primary care in the next few days for reevaluation.  Please make sure that you are elevating your legs above the level of your heart to assist with decreasing the swelling.  You may also use compression stockings to help with the swelling.  Please return to the Emergency Department should you experience any severe worsening of your infection, fevers, chills, chest pain, shortness of breath, or any other concerning symptoms that you would like evaluated.

## 2025-01-12 NOTE — ED PROVIDER NOTES
Time reflects when diagnosis was documented in both MDM as applicable and the Disposition within this note       Time User Action Codes Description Comment    1/12/2025  4:52 AM Tigre Burr [L03.90] Cellulitis     1/12/2025  4:52 AM Tigre Burr [R60.0] Lower extremity edema           ED Disposition       ED Disposition   Discharge    Condition   Stable    Date/Time   Sun Jan 12, 2025  4:54 AM    Comment   Irene Plascencia discharge to home/self care.                   Assessment & Plan       Medical Decision Making  Patient is a 64 y.o. female with PMH of anemia, anxiety, arthritis, Schreiber's esophagus, hypothyroidism, GERD, hyperlipidemia, hypertension who presents to the ED with pain and swelling about her extremities.    Vital signs stable. Physical exam as above.    History and physical exam most consistent with dependent edema with mild cellulitic change. However, differential diagnosis included but not limited to heart failure, DVT, fracture, dislocation.     Plan: Patient's bilateral lower extremities appear well-perfused and have normal capillary refill.  With patient's recent history of sleeping with legs down on a recliner and equal swelling bilaterally, most likely dependent edema.  Patient does have a area of erythema and warmth on the right lower extremity consistent with cellulitis.  We will order Keflex with first dose given in the ED, and prescription sent to patient's pharmacy.    View ED course above for further discussion on patient workup.     On review of previous records, patient was seen by PCP on 1/8/2025.  Visit note reviewed.    All labs reviewed and utilized in the medical decision making process  All radiology studies independently viewed by me and interpreted by the radiologist.  I reviewed all testing with the patient.     Upon re-evaluation, patient continues stable with normal vital signs.    Disposition: Patient discharged in stable condition.  Patient given strict  "return precautions.  Patient given prescription for Keflex to be taken for cellulitic changes of right lower extremity.  Patient will follow-up with PCP in the next few days for reevaluation.    Portions of the record may have been created with voice recognition software. Occasional wrong word or \"sound a like\" substitutions may have occurred due to the inherent limitations of voice recognition software. Read the chart carefully and recognize, using context, where substitutions have occurred.     Risk  Prescription drug management.             Medications   cephalexin (KEFLEX) capsule 500 mg (500 mg Oral Given 1/12/25 0500)       ED Risk Strat Scores                                              History of Present Illness       Chief Complaint   Patient presents with    Leg Swelling     Pt noticed legs swelling last week. Has been mostly sitting in a chair x7 days. Today noticed the legs are worse and \"blotchy.\" Is currently being treated for PNA as well.       Past Medical History:   Diagnosis Date    Anemia     Anxiety     Arthritis     Back pain at L4-L5 level     Schreiber esophagus     Bulimia     Colon polyp     Disease of thyroid gland     hypothyroid    GERD (gastroesophageal reflux disease)     Hearing loss in left ear     History of transfusion     Hyperlipidemia     Hypertension     Hypothyroidism     Leukopenia     NMS (neuroleptic malignant syndrome) 01/03/2020    Pneumonia     RA (rheumatoid arthritis) (HCC)     in feet    Sciatica     Seizure (HCC)     due to medication mix up 8/2018 only one historically    Skin spots, red     lower right arm - poss from cat- no s/s infection    Synovial cyst of lumbar spine     Vertigo     Wears dentures     full set    Weight gain       Past Surgical History:   Procedure Laterality Date    BONE MARROW BIOPSY      BREAST SURGERY      enlargement with implants    CLOSED REDUCTION DISTAL FEMUR FRACTURE      COLONOSCOPY      COSMETIC SURGERY      DENTAL SURGERY      HIP " ARTHROPLASTY Right 01/02/2020    Procedure: REVISION TOTAL HIP ARTHROPLASTY WITH REPAIR OF PARIPROSTHETIC FRACTURE - POSTERIOR APPROACH;  Surgeon: Dilan Pedersen MD;  Location: [facility];  Service: Orthopedics    MD AMPUTATION METATARSAL W/TOE SINGLE Left 04/07/2023    Procedure: AMPUTATION TOE 4th;  Surgeon: Conner Bartlett DPM;  Location: [facility];  Service: Podiatry    MD ARTHRP ACETBLR/PROX FEM PROSTC AGRFT/ALGRFT Right 12/11/2019    Procedure: ARTHROPLASTY HIP TOTAL ANTERIOR;  Surgeon: Dilan Pedersen MD;  Location: [facility];  Service: Orthopedics    MD ESOPHAGOGASTRODUODENOSCOPY TRANSORAL DIAGNOSTIC N/A 05/11/2021    Procedure: ESOPHAGOGASTRODUODENOSCOPY (EGD);  Surgeon: David Cedeño MD;  Location: [facility];  Service: General    MD LAPS RPR PARAESPHGL HRNA INCL FUNDPLSTY W/MESH N/A 05/11/2021    Procedure: REPAIR HERNIA PARAESOPHAGEAL  LAPAROSCOPIC;  Surgeon: David Cedeño MD;  [facility]: [facility];  Service: General    MD UNLISTED PROCEDURE ESOPHAGUS N/A 05/11/2021    Procedure: FUNDOPLICATION TRANSORAL INCISIONLESS;  Surgeon: Brad Cadet MD;  Location: [facility];  Service: Gastroenterology    RHINOPLASTY      SINUS SURGERY      TOE AMPUTATION Left     2023 4th toe    TRANSORAL INCISIONLESS FUNDOPLICATION (TIF)  05/11/2021      Family History   Problem Relation Age of Onset    Uterine cancer Mother     Esophageal cancer Father     Colon cancer Maternal Grandmother       Social History     Tobacco Use    Smoking status: Never     Passive exposure: Past    Smokeless tobacco: Never   Vaping Use    Vaping status: Never Used   Substance Use Topics    Alcohol use: Not Currently    Drug use: Not Currently      E-Cigarette/Vaping    E-Cigarette Use Never User       E-Cigarette/Vaping Substances    Nicotine No     THC No     CBD No     Flavoring No     Other No     Unknown No       I have reviewed and agree with the history as documented.     Patient is a 64-year-old female with past medical history of  anemia, anxiety, arthritis, Schreiber's esophagus, hypothyroidism, GERD, hyperlipidemia, hypertension, who presents today with pain and swelling in her bilateral lower extremities.  Patient notes that over the past week she has noticed some swelling in her bilateral lower extremities.  Patient states that her heating went out in her house and she has been sleeping a recliner for the past week.  Patient states that over the past week her legs have swollen and she has noticed red bumps on her lower extremities.  Patient states that the swelling is painful and causing her to have difficulty walking.  Patient states she has never had this before.  Patient states that the swelling is equal on both legs to her.  Patient denies any fevers, chills, chest pain, shortness of breath, abdominal pain, nausea, vomiting, diarrhea, constipation, headache, vision changes.        Review of Systems   Constitutional:  Negative for chills and fever.   HENT:  Negative for ear pain and sore throat.    Eyes:  Negative for pain and visual disturbance.   Respiratory:  Negative for cough and shortness of breath.    Cardiovascular:  Positive for leg swelling. Negative for chest pain and palpitations.   Gastrointestinal:  Negative for abdominal pain and vomiting.   Genitourinary:  Negative for dysuria and hematuria.   Musculoskeletal:  Negative for arthralgias and back pain.   Skin:  Positive for wound. Negative for color change and rash.   Neurological:  Negative for seizures and syncope.   All other systems reviewed and are negative.          Objective       ED Triage Vitals   Temperature Pulse Blood Pressure Respirations SpO2 Patient Position - Orthostatic VS   01/12/25 0440 01/12/25 0439 01/12/25 0439 01/12/25 0439 01/12/25 0439 --   98.7 °F (37.1 °C) 102 140/74 18 95 %       Temp Source Heart Rate Source BP Location FiO2 (%) Pain Score    01/12/25 0440 01/12/25 0439 -- -- --    Oral Monitor         Vitals      Date and Time Temp Pulse SpO2  Resp BP Pain Score FACES Pain Rating User   25 0440 98.7 °F (37.1 °C) -- -- -- -- -- -- [facility]   25 0439 -- 102 95 % 18 140/74 -- -- [facility]            Physical Exam  Vitals and nursing note reviewed.   Constitutional:       General: She is not in acute distress.     Appearance: She is well-developed. She is obese. She is ill-appearing (Chronically).   HENT:      Head: Normocephalic and atraumatic.      Nose: Nose normal.      Mouth/Throat:      Mouth: Mucous membranes are moist.      Pharynx: Oropharynx is clear.   Eyes:      Extraocular Movements: Extraocular movements intact.      Conjunctiva/sclera: Conjunctivae normal.      Pupils: Pupils are equal, round, and reactive to light.   Cardiovascular:      Rate and Rhythm: Normal rate and regular rhythm.      Pulses: Normal pulses.      Heart sounds: Normal heart sounds. No murmur heard.     No friction rub. No gallop.   Pulmonary:      Effort: Pulmonary effort is normal. No respiratory distress.      Breath sounds: Normal breath sounds.   Abdominal:      General: Abdomen is flat. Bowel sounds are normal. There is no distension.      Palpations: Abdomen is soft. There is no mass.      Tenderness: There is no abdominal tenderness. There is no guarding or rebound.   Musculoskeletal:         General: No swelling. Normal range of motion.      Cervical back: Normal range of motion and neck supple.      Right lower le+ Pitting Edema present.      Left lower le+ Pitting Edema present.      Comments: The patient has an area of erythema on the right lower extremity.  It is approximately 3 cm x 4 cm.  Patient's legs are warm to the touch.  The swelling is present equally in bilateral lower extremities.  There is no drainage or obvious wounds noted.   Skin:     General: Skin is warm and dry.      Capillary Refill: Capillary refill takes less than 2 seconds.   Neurological:      Mental Status: She is alert.   Psychiatric:         Mood and Affect: Mood normal.          Results Reviewed       None            No orders to display       Procedures    ED Medication and Procedure Management   Prior to Admission Medications   Prescriptions Last Dose Informant Patient Reported? Taking?   LORazepam (ATIVAN) 2 mg tablet   No No   Sig: Take 1 tablet (2 mg total) by mouth every 6 (six) hours as needed for anxiety Max 5 times a day   PARoxetine (PAXIL) 30 mg tablet   No No   Sig: TAKE 2 TABLETS (60 MG TOTAL) BY MOUTH IN THE MORNING   QUEtiapine (SEROquel XR) 400 mg 24 hr tablet  Self No No   Sig: TAKE 1 TABLET (400 MG TOTAL) BY MOUTH DAILY AT BEDTIME   amLODIPine (NORVASC) 5 mg tablet  Self No No   Sig: TAKE 1 TABLET (5 MG TOTAL) BY MOUTH DAILY.   benzonatate (TESSALON) 200 MG capsule   No No   Sig: Take 1 capsule (200 mg total) by mouth 3 (three) times a day as needed for cough   buPROPion (WELLBUTRIN) 75 mg tablet   Yes No   Sig: Take 75 mg by mouth   celecoxib (CeleBREX) 200 mg capsule   Yes No   Sig: Take 200 mg by mouth daily   levothyroxine 25 mcg tablet  Self No No   Sig: TAKE 1 TABLET BY MOUTH EVERY DAY   loratadine (CLARITIN) 10 mg tablet   No No   Sig: Take 1 tablet (10 mg total) by mouth daily   metoclopramide (REGLAN) 5 mg tablet   No No   Sig: TAKE 1 TABLET BY MOUTH 3 TIMES A DAY BEFORE MEALS.   omeprazole (PriLOSEC) 40 MG capsule   No No   Sig: Take 1 capsule (40 mg total) by mouth 2 (two) times a day   ondansetron (ZOFRAN) 4 mg tablet   No No   Sig: Take 1 tablet (4 mg total) by mouth every 8 (eight) hours as needed for nausea or vomiting   sucralfate (CARAFATE) 1 g tablet   No No   Sig: TAKE 1 TABLET (1 G TOTAL) BY MOUTH 3 (THREE) TIMES A DAY WITH MEALS      Facility-Administered Medications: None     Discharge Medication List as of 1/12/2025  4:58 AM        START taking these medications    Details   cephalexin (KEFLEX) 500 mg capsule Take 1 capsule (500 mg total) by mouth every 6 (six) hours for 7 days, Starting Sun 1/12/2025, Until Sun 1/19/2025, Normal            CONTINUE these medications which have NOT CHANGED    Details   amLODIPine (NORVASC) 5 mg tablet TAKE 1 TABLET (5 MG TOTAL) BY MOUTH DAILY., Starting Sat 2/3/2024, Normal      benzonatate (TESSALON) 200 MG capsule Take 1 capsule (200 mg total) by mouth 3 (three) times a day as needed for cough, Starting Mon 12/16/2024, Normal      buPROPion (WELLBUTRIN) 75 mg tablet Take 75 mg by mouth, Starting Sun 12/22/2024, Historical Med      celecoxib (CeleBREX) 200 mg capsule Take 200 mg by mouth daily, Starting Sat 12/28/2024, Historical Med      levothyroxine 25 mcg tablet TAKE 1 TABLET BY MOUTH EVERY DAY, Normal      loratadine (CLARITIN) 10 mg tablet Take 1 tablet (10 mg total) by mouth daily, Starting Fri 11/22/2024, Normal      LORazepam (ATIVAN) 2 mg tablet Take 1 tablet (2 mg total) by mouth every 6 (six) hours as needed for anxiety Max 5 times a day, Starting Mon 12/16/2024, Normal      metoclopramide (REGLAN) 5 mg tablet TAKE 1 TABLET BY MOUTH 3 TIMES A DAY BEFORE MEALS., Starting Tue 11/26/2024, Normal      omeprazole (PriLOSEC) 40 MG capsule Take 1 capsule (40 mg total) by mouth 2 (two) times a day, Starting Thu 12/26/2024, Normal      ondansetron (ZOFRAN) 4 mg tablet Take 1 tablet (4 mg total) by mouth every 8 (eight) hours as needed for nausea or vomiting, Starting Thu 11/21/2024, Normal      PARoxetine (PAXIL) 30 mg tablet TAKE 2 TABLETS (60 MG TOTAL) BY MOUTH IN THE MORNING, Starting Sat 9/21/2024, Normal      QUEtiapine (SEROquel XR) 400 mg 24 hr tablet TAKE 1 TABLET (400 MG TOTAL) BY MOUTH DAILY AT BEDTIME, Starting Thu 5/9/2024, Normal      sucralfate (CARAFATE) 1 g tablet TAKE 1 TABLET (1 G TOTAL) BY MOUTH 3 (THREE) TIMES A DAY WITH MEALS, Starting Sat 9/21/2024, Normal           No discharge procedures on file.  ED SEPSIS DOCUMENTATION   Time reflects when diagnosis was documented in both MDM as applicable and the Disposition within this note       Time User Action Codes Description Comment     1/12/2025  4:52 AM Tigre Burr [L03.90] Cellulitis     1/12/2025  4:52 AM Tigre Burr [R60.0] Lower extremity edema                  Tigre Burr DO  01/12/25 0531

## 2025-01-15 NOTE — ASSESSMENT & PLAN NOTE
LEOPOLDO reviewed chart and discussed in Md rounds this am. Pt was ref to [facility] and [facility] and both denied her stating she could not tolerate 3 hours of therapy a day. Spoke with Dr. Venegas this am about IV antibiotics. Awaiting final cultures. Spoke at length with pt about her discharge plans. Pt was tearful over all she has been through since Aug when her  . She has gone from being indep and active to requiring caregivers to assist her at home. Now that IV antibiotics are likely also needed, pt feels she needs to return to Rehab. Referrals sent out to [facility], and [place], [place]. Sw will await bed offers and follow up with ID.  Samara Fitzgerald/MATTHEW   Thought to be due to psych meds per note review, chronic  Received 2L IVF prior to admission  Improved s/p IVF  Monitor

## 2025-01-18 ENCOUNTER — HOSPITAL ENCOUNTER (INPATIENT)
Facility: HOSPITAL | Age: 65
LOS: 1 days | Discharge: HOME HEALTH | DRG: 556 | End: 2025-01-20
Attending: EMERGENCY MEDICINE | Admitting: INTERNAL MEDICINE
Payer: COMMERCIAL

## 2025-01-18 DIAGNOSIS — K20.90 ESOPHAGITIS: ICD-10-CM

## 2025-01-18 DIAGNOSIS — E87.1 HYPONATREMIA: ICD-10-CM

## 2025-01-18 DIAGNOSIS — K29.70 GASTRITIS: ICD-10-CM

## 2025-01-18 DIAGNOSIS — Z13.9 ENCOUNTER FOR SCREENING INVOLVING SOCIAL DETERMINANTS OF HEALTH (SDOH): ICD-10-CM

## 2025-01-18 DIAGNOSIS — K44.9 HIATAL HERNIA: ICD-10-CM

## 2025-01-18 DIAGNOSIS — K92.0 HEMATEMESIS: ICD-10-CM

## 2025-01-18 DIAGNOSIS — R53.1 WEAKNESS: Primary | ICD-10-CM

## 2025-01-18 DIAGNOSIS — R51.9 NONINTRACTABLE HEADACHE, UNSPECIFIED CHRONICITY PATTERN, UNSPECIFIED HEADACHE TYPE: ICD-10-CM

## 2025-01-18 DIAGNOSIS — K22.70 BARRETT ESOPHAGUS: ICD-10-CM

## 2025-01-18 DIAGNOSIS — R26.2 AMBULATORY DYSFUNCTION: ICD-10-CM

## 2025-01-18 PROCEDURE — 99285 EMERGENCY DEPT VISIT HI MDM: CPT

## 2025-01-18 PROCEDURE — 99285 EMERGENCY DEPT VISIT HI MDM: CPT | Performed by: EMERGENCY MEDICINE

## 2025-01-18 RX ORDER — ONDANSETRON 2 MG/ML
1 INJECTION INTRAMUSCULAR; INTRAVENOUS ONCE
Status: COMPLETED | OUTPATIENT
Start: 2025-01-18 | End: 2025-01-18

## 2025-01-18 RX ORDER — PANTOPRAZOLE SODIUM 40 MG/10ML
40 INJECTION, POWDER, LYOPHILIZED, FOR SOLUTION INTRAVENOUS ONCE
Status: COMPLETED | OUTPATIENT
Start: 2025-01-19 | End: 2025-01-19

## 2025-01-18 RX ORDER — SODIUM CHLORIDE, SODIUM GLUCONATE, SODIUM ACETATE, POTASSIUM CHLORIDE, MAGNESIUM CHLORIDE, SODIUM PHOSPHATE, DIBASIC, AND POTASSIUM PHOSPHATE .53; .5; .37; .037; .03; .012; .00082 G/100ML; G/100ML; G/100ML; G/100ML; G/100ML; G/100ML; G/100ML
1000 INJECTION, SOLUTION INTRAVENOUS ONCE
Status: COMPLETED | OUTPATIENT
Start: 2025-01-19 | End: 2025-01-19

## 2025-01-19 ENCOUNTER — APPOINTMENT (EMERGENCY)
Dept: RADIOLOGY | Facility: HOSPITAL | Age: 65
DRG: 556 | End: 2025-01-19
Payer: COMMERCIAL

## 2025-01-19 PROBLEM — R06.09 DYSPNEA ON EXERTION: Status: ACTIVE | Noted: 2025-01-19

## 2025-01-19 PROBLEM — Z65.8 LACK OF SOCIAL SUPPORT: Status: ACTIVE | Noted: 2025-01-19

## 2025-01-19 PROBLEM — R26.2 AMBULATORY DYSFUNCTION: Status: ACTIVE | Noted: 2025-01-19

## 2025-01-19 PROBLEM — Z60.2 PROBLEMS RELATED TO LIVING ALONE: Status: ACTIVE | Noted: 2025-01-19

## 2025-01-19 PROBLEM — R53.1 GENERALIZED WEAKNESS: Status: ACTIVE | Noted: 2025-01-19

## 2025-01-19 LAB
4HR DELTA HS TROPONIN: 0 NG/L
ALBUMIN SERPL BCG-MCNC: 4.1 G/DL (ref 3.5–5)
ALP SERPL-CCNC: 86 U/L (ref 34–104)
ALT SERPL W P-5'-P-CCNC: 17 U/L (ref 7–52)
ANION GAP SERPL CALCULATED.3IONS-SCNC: 9 MMOL/L (ref 4–13)
AST SERPL W P-5'-P-CCNC: 14 U/L (ref 13–39)
BASOPHILS # BLD AUTO: 0.02 THOUSANDS/ΜL (ref 0–0.1)
BASOPHILS NFR BLD AUTO: 0 % (ref 0–1)
BILIRUB SERPL-MCNC: 0.33 MG/DL (ref 0.2–1)
BUN SERPL-MCNC: 22 MG/DL (ref 5–25)
CALCIUM SERPL-MCNC: 10.8 MG/DL (ref 8.4–10.2)
CARDIAC TROPONIN I PNL SERPL HS: 4 NG/L (ref ?–50)
CARDIAC TROPONIN I PNL SERPL HS: 4 NG/L (ref ?–50)
CHLORIDE SERPL-SCNC: 92 MMOL/L (ref 96–108)
CO2 SERPL-SCNC: 28 MMOL/L (ref 21–32)
CREAT SERPL-MCNC: 0.73 MG/DL (ref 0.6–1.3)
EOSINOPHIL # BLD AUTO: 0.01 THOUSAND/ΜL (ref 0–0.61)
EOSINOPHIL NFR BLD AUTO: 0 % (ref 0–6)
ERYTHROCYTE [DISTWIDTH] IN BLOOD BY AUTOMATED COUNT: 14.5 % (ref 11.6–15.1)
GFR SERPL CREATININE-BSD FRML MDRD: 87 ML/MIN/1.73SQ M
GLUCOSE SERPL-MCNC: 119 MG/DL (ref 65–140)
HCT VFR BLD AUTO: 36.7 % (ref 34.8–46.1)
HGB BLD-MCNC: 12.1 G/DL (ref 11.5–15.4)
IMM GRANULOCYTES # BLD AUTO: 0.01 THOUSAND/UL (ref 0–0.2)
IMM GRANULOCYTES NFR BLD AUTO: 0 % (ref 0–2)
LIPASE SERPL-CCNC: 9 U/L (ref 11–82)
LYMPHOCYTES # BLD AUTO: 0.47 THOUSANDS/ΜL (ref 0.6–4.47)
LYMPHOCYTES NFR BLD AUTO: 9 % (ref 14–44)
MCH RBC QN AUTO: 27.4 PG (ref 26.8–34.3)
MCHC RBC AUTO-ENTMCNC: 33 G/DL (ref 31.4–37.4)
MCV RBC AUTO: 83 FL (ref 82–98)
MONOCYTES # BLD AUTO: 0.45 THOUSAND/ΜL (ref 0.17–1.22)
MONOCYTES NFR BLD AUTO: 8 % (ref 4–12)
NEUTROPHILS # BLD AUTO: 4.59 THOUSANDS/ΜL (ref 1.85–7.62)
NEUTS SEG NFR BLD AUTO: 83 % (ref 43–75)
NRBC BLD AUTO-RTO: 0 /100 WBCS
PLATELET # BLD AUTO: 304 THOUSANDS/UL (ref 149–390)
PMV BLD AUTO: 8.5 FL (ref 8.9–12.7)
POTASSIUM SERPL-SCNC: 4 MMOL/L (ref 3.5–5.3)
PROT SERPL-MCNC: 6.8 G/DL (ref 6.4–8.4)
RBC # BLD AUTO: 4.42 MILLION/UL (ref 3.81–5.12)
SODIUM SERPL-SCNC: 129 MMOL/L (ref 135–147)
WBC # BLD AUTO: 5.55 THOUSAND/UL (ref 4.31–10.16)

## 2025-01-19 PROCEDURE — 99223 1ST HOSP IP/OBS HIGH 75: CPT | Performed by: INTERNAL MEDICINE

## 2025-01-19 PROCEDURE — 80053 COMPREHEN METABOLIC PANEL: CPT

## 2025-01-19 PROCEDURE — 74177 CT ABD & PELVIS W/CONTRAST: CPT

## 2025-01-19 PROCEDURE — 96375 TX/PRO/DX INJ NEW DRUG ADDON: CPT

## 2025-01-19 PROCEDURE — 99285 EMERGENCY DEPT VISIT HI MDM: CPT | Performed by: EMERGENCY MEDICINE

## 2025-01-19 PROCEDURE — 84484 ASSAY OF TROPONIN QUANT: CPT

## 2025-01-19 PROCEDURE — 85025 COMPLETE CBC W/AUTO DIFF WBC: CPT

## 2025-01-19 PROCEDURE — 36415 COLL VENOUS BLD VENIPUNCTURE: CPT

## 2025-01-19 PROCEDURE — 71046 X-RAY EXAM CHEST 2 VIEWS: CPT

## 2025-01-19 PROCEDURE — 96365 THER/PROPH/DIAG IV INF INIT: CPT

## 2025-01-19 PROCEDURE — 97163 PT EVAL HIGH COMPLEX 45 MIN: CPT

## 2025-01-19 PROCEDURE — 83690 ASSAY OF LIPASE: CPT

## 2025-01-19 PROCEDURE — 99223 1ST HOSP IP/OBS HIGH 75: CPT | Performed by: PHYSICIAN ASSISTANT

## 2025-01-19 PROCEDURE — 97167 OT EVAL HIGH COMPLEX 60 MIN: CPT

## 2025-01-19 RX ORDER — PANTOPRAZOLE SODIUM 40 MG/1
40 TABLET, DELAYED RELEASE ORAL
Status: DISCONTINUED | OUTPATIENT
Start: 2025-01-19 | End: 2025-01-20 | Stop reason: HOSPADM

## 2025-01-19 RX ORDER — QUETIAPINE 200 MG/1
400 TABLET, FILM COATED, EXTENDED RELEASE ORAL
Status: DISCONTINUED | OUTPATIENT
Start: 2025-01-19 | End: 2025-01-20 | Stop reason: HOSPADM

## 2025-01-19 RX ORDER — METOCLOPRAMIDE 5 MG/1
5 TABLET ORAL
Status: DISCONTINUED | OUTPATIENT
Start: 2025-01-19 | End: 2025-01-20 | Stop reason: HOSPADM

## 2025-01-19 RX ORDER — LEVOTHYROXINE SODIUM 25 UG/1
25 TABLET ORAL DAILY
Status: DISCONTINUED | OUTPATIENT
Start: 2025-01-19 | End: 2025-01-20 | Stop reason: HOSPADM

## 2025-01-19 RX ORDER — SODIUM CHLORIDE 9 MG/ML
75 INJECTION, SOLUTION INTRAVENOUS CONTINUOUS
Status: DISPENSED | OUTPATIENT
Start: 2025-01-19 | End: 2025-01-19

## 2025-01-19 RX ORDER — LORAZEPAM 1 MG/1
2 TABLET ORAL EVERY 6 HOURS PRN
Status: DISCONTINUED | OUTPATIENT
Start: 2025-01-19 | End: 2025-01-20 | Stop reason: HOSPADM

## 2025-01-19 RX ORDER — CALCIUM CARBONATE 500 MG/1
1000 TABLET, CHEWABLE ORAL DAILY PRN
Status: DISCONTINUED | OUTPATIENT
Start: 2025-01-19 | End: 2025-01-20 | Stop reason: HOSPADM

## 2025-01-19 RX ORDER — LORATADINE 10 MG/1
10 TABLET ORAL DAILY PRN
Status: DISCONTINUED | OUTPATIENT
Start: 2025-01-19 | End: 2025-01-20 | Stop reason: HOSPADM

## 2025-01-19 RX ORDER — AMLODIPINE BESYLATE 5 MG/1
5 TABLET ORAL DAILY
Status: DISCONTINUED | OUTPATIENT
Start: 2025-01-19 | End: 2025-01-20 | Stop reason: HOSPADM

## 2025-01-19 RX ORDER — ACETAMINOPHEN 325 MG/1
650 TABLET ORAL EVERY 6 HOURS PRN
Status: DISCONTINUED | OUTPATIENT
Start: 2025-01-19 | End: 2025-01-20 | Stop reason: HOSPADM

## 2025-01-19 RX ORDER — POLYETHYLENE GLYCOL 3350 17 G/17G
17 POWDER, FOR SOLUTION ORAL DAILY PRN
Status: DISCONTINUED | OUTPATIENT
Start: 2025-01-19 | End: 2025-01-20 | Stop reason: HOSPADM

## 2025-01-19 RX ORDER — ONDANSETRON 2 MG/ML
4 INJECTION INTRAMUSCULAR; INTRAVENOUS EVERY 4 HOURS PRN
Status: DISCONTINUED | OUTPATIENT
Start: 2025-01-19 | End: 2025-01-20 | Stop reason: HOSPADM

## 2025-01-19 RX ORDER — PAROXETINE 20 MG/1
60 TABLET, FILM COATED ORAL DAILY
Status: DISCONTINUED | OUTPATIENT
Start: 2025-01-19 | End: 2025-01-20 | Stop reason: HOSPADM

## 2025-01-19 RX ORDER — WATER 10 ML/10ML
INJECTION INTRAMUSCULAR; INTRAVENOUS; SUBCUTANEOUS
Status: COMPLETED
Start: 2025-01-19 | End: 2025-01-19

## 2025-01-19 RX ORDER — BUPROPION HYDROCHLORIDE 75 MG/1
75 TABLET ORAL EVERY MORNING
Status: CANCELLED | OUTPATIENT
Start: 2025-01-19

## 2025-01-19 RX ORDER — ENOXAPARIN SODIUM 100 MG/ML
40 INJECTION SUBCUTANEOUS DAILY
Status: DISCONTINUED | OUTPATIENT
Start: 2025-01-19 | End: 2025-01-20 | Stop reason: HOSPADM

## 2025-01-19 RX ORDER — CEPHALEXIN 500 MG/1
500 CAPSULE ORAL EVERY 6 HOURS SCHEDULED
Status: COMPLETED | OUTPATIENT
Start: 2025-01-19 | End: 2025-01-19

## 2025-01-19 RX ORDER — SUCRALFATE 1 G/1
1 TABLET ORAL
Status: DISCONTINUED | OUTPATIENT
Start: 2025-01-19 | End: 2025-01-20 | Stop reason: HOSPADM

## 2025-01-19 RX ORDER — PANTOPRAZOLE SODIUM 40 MG/10ML
40 INJECTION, POWDER, LYOPHILIZED, FOR SOLUTION INTRAVENOUS EVERY 12 HOURS SCHEDULED
Status: DISCONTINUED | OUTPATIENT
Start: 2025-01-19 | End: 2025-01-19

## 2025-01-19 RX ADMIN — IOHEXOL 100 ML: 350 INJECTION, SOLUTION INTRAVENOUS at 03:48

## 2025-01-19 RX ADMIN — LEVOTHYROXINE SODIUM 25 MCG: 25 TABLET ORAL at 12:12

## 2025-01-19 RX ADMIN — PANTOPRAZOLE SODIUM 40 MG: 40 INJECTION, POWDER, FOR SOLUTION INTRAVENOUS at 01:16

## 2025-01-19 RX ADMIN — PANTOPRAZOLE SODIUM 40 MG: 40 TABLET, DELAYED RELEASE ORAL at 09:10

## 2025-01-19 RX ADMIN — PAROXETINE HYDROCHLORIDE 60 MG: 20 TABLET, FILM COATED ORAL at 09:10

## 2025-01-19 RX ADMIN — SODIUM CHLORIDE, SODIUM GLUCONATE, SODIUM ACETATE, POTASSIUM CHLORIDE, MAGNESIUM CHLORIDE, SODIUM PHOSPHATE, DIBASIC, AND POTASSIUM PHOSPHATE 1000 ML: .53; .5; .37; .037; .03; .012; .00082 INJECTION, SOLUTION INTRAVENOUS at 01:00

## 2025-01-19 RX ADMIN — QUETIAPINE 400 MG: 200 TABLET, FILM COATED, EXTENDED RELEASE ORAL at 21:25

## 2025-01-19 RX ADMIN — CEPHALEXIN 500 MG: 500 CAPSULE ORAL at 12:13

## 2025-01-19 RX ADMIN — WATER 10 ML: 1 INJECTION INTRAMUSCULAR; INTRAVENOUS; SUBCUTANEOUS at 01:17

## 2025-01-19 RX ADMIN — AMLODIPINE BESYLATE 5 MG: 5 TABLET ORAL at 12:13

## 2025-01-19 RX ADMIN — SUCRALFATE 1 G: 1 TABLET ORAL at 17:38

## 2025-01-19 RX ADMIN — LORAZEPAM 2 MG: 1 TABLET ORAL at 09:10

## 2025-01-19 RX ADMIN — CEPHALEXIN 500 MG: 500 CAPSULE ORAL at 05:31

## 2025-01-19 RX ADMIN — METOCLOPRAMIDE 5 MG: 5 TABLET ORAL at 12:13

## 2025-01-19 RX ADMIN — ENOXAPARIN SODIUM 40 MG: 40 INJECTION SUBCUTANEOUS at 12:14

## 2025-01-19 RX ADMIN — LORATADINE 10 MG: 10 TABLET ORAL at 09:10

## 2025-01-19 RX ADMIN — SUCRALFATE 1 G: 1 TABLET ORAL at 12:13

## 2025-01-19 RX ADMIN — CEPHALEXIN 500 MG: 500 CAPSULE ORAL at 17:38

## 2025-01-19 RX ADMIN — METOCLOPRAMIDE 5 MG: 5 TABLET ORAL at 17:38

## 2025-01-19 RX ADMIN — PANTOPRAZOLE SODIUM 40 MG: 40 TABLET, DELAYED RELEASE ORAL at 17:37

## 2025-01-19 RX ADMIN — SODIUM CHLORIDE 75 ML/HR: 0.9 INJECTION, SOLUTION INTRAVENOUS at 05:32

## 2025-01-19 RX ADMIN — CEPHALEXIN 500 MG: 500 CAPSULE ORAL at 23:45

## 2025-01-19 RX ADMIN — SUCRALFATE 1 G: 1 TABLET ORAL at 09:10

## 2025-01-19 NOTE — ASSESSMENT & PLAN NOTE
Reports 4 episodes of brown vomitus yesterday, possibly coffee ground. No abdominal pain.   Hgb stable at 12. Repeat tomorrow AM.   RN and patient report no vomiting since arrival to ED, patient asking to eat and feels much improved  Continue carafate, PPI bid  Had EGD on 12/24 with Grade D esophagitis and medium hiatal hernia with plan to continue PPI bid and repeat EGD in 4 weeks. Consider GI consult if further episodes concerning for coffee ground emesis, however given resolution of symptoms since admission and hgb stable will hold off for now.

## 2025-01-19 NOTE — ED PROVIDER NOTES
Time reflects when diagnosis was documented in both MDM as applicable and the Disposition within this note       Time User Action Codes Description Comment    1/19/2025  3:21 AM Clayton Yuni Add [R53.1] Weakness           ED Disposition       ED Disposition   Admit    Condition   Stable    Date/Time   Sun Jan 19, 2025  4:43 AM    Comment                  Assessment & Plan       Medical Decision Making  Amount and/or Complexity of Data Reviewed  Labs: ordered. Decision-making details documented in ED Course.  Radiology: ordered.    Risk  Prescription drug management.      Patient is 64-year-old female with past ministry of hypertension, hyperlipidemia, hypothyroidism, GI bleed, Schreiber's esophagus, gastroparesis esophagitis and schizoaffective disorder presenting to ED for evaluation of generalized weakness and concern for GI bleed.  See history and physical documented below.    Differential diagnosis included but not limited to GI bleed, functional decline, anemia, electrolyte disturbance, ACS, dehydration. Plan CBC, CMP, lipase, troponin, EKG, CXR, CT abdomen/pelvis. Will give IVF and reassess.     View ED course above for further discussion on patient workup.     EKG shows NSR 92 bpm, normal axis, normal intervals, non specific t wave changes, similar to prior.     All labs reviewed and utilized in the medical decision making process  All radiology studies independently viewed by me and interpreted by the radiologist.  I reviewed all testing with the patient.     Signed out at end of my shift pending labs and CT abdomen/pelvis.     Will likely require admission for social supports.              ED Course as of 01/19/25 1820   Sun Jan 19, 2025   0214 hs TnI 0hr: 4   0214 WBC: 5.55   0214 Hemoglobin: 12.1   0214 Platelet Count: 304   0320 LIPASE(!): 9   0410 Sodium(!): 129   0410 Calcium(!): 10.8       Medications   ondansetron (FOR EMS ONLY) (ZOFRAN) 4 mg/2 mL injection 4 mg (0 mg Does not apply Given to EMS  1/18/25 2318)   pantoprazole (PROTONIX) injection 40 mg (40 mg Intravenous Given 1/19/25 0116)   multi-electrolyte (ISOLYTE-S PH 7.4) bolus 1,000 mL (0 mL Intravenous Stopped 1/19/25 0200)   sterile water injection **ADS Override Pull** (10 mL  Given 1/19/25 0117)   iohexol (OMNIPAQUE) 350 MG/ML injection (SINGLE-DOSE) 100 mL (100 mL Intravenous Given 1/19/25 0348)       ED Risk Strat Scores                                              History of Present Illness       Chief Complaint   Patient presents with    Vomiting     Pt complains of vomiting up coffee brown like substance since about five hours ago. Denies abdominal pain and diarrhea.       Past Medical History:   Diagnosis Date    Anemia     Anxiety     Arthritis     Back pain at L4-L5 level     Schreiber esophagus     Bulimia     Colon polyp     Disease of thyroid gland     hypothyroid    GERD (gastroesophageal reflux disease)     Hearing loss in left ear     History of transfusion     Hyperlipidemia     Hypertension     Hypothyroidism     Leukopenia     NMS (neuroleptic malignant syndrome) 01/03/2020    Pneumonia     RA (rheumatoid arthritis) (HCC)     in feet    Sciatica     Seizure (HCC)     due to medication mix up 8/2018 only one historically    Skin spots, red     lower right arm - poss from cat- no s/s infection    Synovial cyst of lumbar spine     Vertigo     Wears dentures     full set    Weight gain       Past Surgical History:   Procedure Laterality Date    BONE MARROW BIOPSY      BREAST SURGERY      enlargement with implants    CLOSED REDUCTION DISTAL FEMUR FRACTURE      COLONOSCOPY      COSMETIC SURGERY      DENTAL SURGERY      HIP ARTHROPLASTY Right 01/02/2020    Procedure: REVISION TOTAL HIP ARTHROPLASTY WITH REPAIR OF PARIPROSTHETIC FRACTURE - POSTERIOR APPROACH;  Surgeon: Dilan Pedersen MD;  Location: [facility];  Service: Orthopedics    OR AMPUTATION METATARSAL W/TOE SINGLE Left 04/07/2023    Procedure: AMPUTATION TOE 4th;  Surgeon: Conner  RAFAEL Bartlett;  Location:  MAIN OR;  Service: Podiatry    MA ARTHRP ACETBLR/PROX FEM PROSTC AGRFT/ALGRFT Right 12/11/2019    Procedure: ARTHROPLASTY HIP TOTAL ANTERIOR;  Surgeon: Dilan Pedersen MD;  Location: [facility];  Service: Orthopedics    MA ESOPHAGOGASTRODUODENOSCOPY TRANSORAL DIAGNOSTIC N/A 05/11/2021    Procedure: ESOPHAGOGASTRODUODENOSCOPY (EGD);  Surgeon: David Cedeño MD;  Location: [facility];  Service: General    MA LAPS RPR PARAESPHGL HRNA INCL FUNDPLSTY W/MESH N/A 05/11/2021    Procedure: REPAIR HERNIA PARAESOPHAGEAL  LAPAROSCOPIC;  Surgeon: David Cedeño MD;  Location: [facility];  Service: General    MA UNLISTED PROCEDURE ESOPHAGUS N/A 05/11/2021    Procedure: FUNDOPLICATION TRANSORAL INCISIONLESS;  Surgeon: Brad aCdet MD;  Location: [facility];  Service: Gastroenterology    RHINOPLASTY      SINUS SURGERY      TOE AMPUTATION Left     2023 4th toe    TRANSORAL INCISIONLESS FUNDOPLICATION (TIF)  05/11/2021      Family History   Problem Relation Age of Onset    Uterine cancer Mother     Esophageal cancer Father     Colon cancer Maternal Grandmother       Social History     Tobacco Use    Smoking status: Never     Passive exposure: Past    Smokeless tobacco: Never   Vaping Use    Vaping status: Never Used   Substance Use Topics    Alcohol use: Not Currently    Drug use: Not Currently      E-Cigarette/Vaping    E-Cigarette Use Never User       E-Cigarette/Vaping Substances    Nicotine No     THC No     CBD No     Flavoring No     Other No     Unknown No       I have reviewed and agree with the history as documented.     HPI  Patient is 64-year-old female with past ministry of hypertension, hyperlipidemia, hypothyroidism, GI bleed, Schreiber's esophagus, gastroparesis esophagitis and schizoaffective disorder presenting to ED for evaluation of generalized weakness and concern for GI bleed.  Patient reports admitted back in September for pneumonia and respiratory failure with MICU stay subsequent  admission in December for hypoxia and feels as though she has not been doing well since being discharged.  She reports increasing generalized fatigue and weakness which became worse this morning.  She reports that she was unable to walk to the kitchen to eat/eating all day.  This evening she began vomiting black/brown vomitus.  She reports she does not have a working phone so walking to neighbors house to call 911 which is very difficult because dyspnea on exertion.  She denies fever, chills, chest pain, abdominal pain, diarrhea, urinary complaints.    Review of Systems   Constitutional:  Positive for fatigue. Negative for chills and fever.   HENT:  Negative for ear pain and sore throat.    Respiratory:  Positive for shortness of breath. Negative for cough.    Cardiovascular:  Negative for chest pain, palpitations and leg swelling.   Gastrointestinal:  Positive for abdominal pain, nausea and vomiting. Negative for diarrhea.   Genitourinary:  Negative for dysuria, frequency and hematuria.   Musculoskeletal:  Negative for back pain and neck pain.   Skin:  Negative for rash.   Neurological:  Positive for weakness. Negative for dizziness, light-headedness and headaches.           Objective       ED Triage Vitals   Temperature Pulse Blood Pressure Respirations SpO2 Patient Position - Orthostatic VS   01/18/25 2321 01/18/25 2318 01/18/25 2319 01/18/25 2318 01/18/25 2318 --   99.7 °F (37.6 °C) 82 121/73 20 91 %       Temp Source Heart Rate Source BP Location FiO2 (%) Pain Score    01/18/25 2321 01/18/25 2318 01/18/25 2319 -- --    Oral Monitor Right arm        Vitals      Date and Time Temp Pulse SpO2 Resp BP Pain Score FACES Pain Rating User   01/18/25 2321 99.7 °F (37.6 °C) -- -- -- -- -- -- CM   01/18/25 2319 -- -- 93 % -- 121/73 -- -- CM   01/18/25 2318 -- 82 91 % 20 -- -- -- CM            Physical Exam  Vitals reviewed.   Constitutional:       General: She is awake.      Comments: Chronically ill appearing    HENT:       Head: Normocephalic and atraumatic.      Mouth/Throat:      Mouth: Mucous membranes are dry.   Eyes:      Extraocular Movements: Extraocular movements intact.      Right eye: No nystagmus.      Left eye: No nystagmus.      Conjunctiva/sclera: Conjunctivae normal.      Pupils: Pupils are equal, round, and reactive to light.   Cardiovascular:      Rate and Rhythm: Regular rhythm. Tachycardia present.      Pulses: Normal pulses.      Heart sounds: Normal heart sounds, S1 normal and S2 normal. Heart sounds not distant. No murmur heard.     No friction rub. No gallop.   Pulmonary:      Breath sounds: No stridor. No wheezing, rhonchi or rales.      Comments: CTA b/l   Abdominal:      General: Bowel sounds are normal.      Palpations: Abdomen is soft.      Tenderness: There is abdominal tenderness in the epigastric area.   Musculoskeletal:      Right lower leg: No edema.      Left lower leg: No edema.   Skin:     General: Skin is warm and dry.      Capillary Refill: Capillary refill takes less than 2 seconds.   Neurological:      Mental Status: She is alert and oriented to person, place, and time.      GCS: GCS eye subscore is 4. GCS verbal subscore is 5. GCS motor subscore is 6.      Comments: Moves all extremities equally          Results Reviewed       Procedure Component Value Units Date/Time    Comprehensive metabolic panel [[id number]]  (Abnormal) Collected: 01/19/25 0113    Lab Status: Final result Specimen: Blood from Arm, Right Updated: 01/19/25 0349     Sodium 129 mmol/L      Potassium 4.0 mmol/L      Chloride 92 mmol/L      CO2 28 mmol/L      ANION GAP 9 mmol/L      BUN 22 mg/dL      Creatinine 0.73 mg/dL      Glucose 119 mg/dL      Calcium 10.8 mg/dL      AST 14 U/L      ALT 17 U/L      Alkaline Phosphatase 86 U/L      Total Protein 6.8 g/dL      Albumin 4.1 g/dL      Total Bilirubin 0.33 mg/dL      eGFR 87 ml/min/1.73sq m     Narrative:      [employer] Kid[employer] guidelines for Chronic Kidney  Disease (CKD):     Stage 1 with normal or high GFR (GFR > 90 mL/min/1.73 square meters)    Stage 2 Mild CKD (GFR = 60-89 mL/min/1.73 square meters)    Stage 3A Moderate CKD (GFR = 45-59 mL/min/1.73 square meters)    Stage 3B Moderate CKD (GFR = 30-44 mL/min/1.73 square meters)    Stage 4 Severe CKD (GFR = 15-29 mL/min/1.73 square meters)    Stage 5 End Stage CKD (GFR <15 mL/min/1.73 square meters)  Note: GFR calculation is accurate only with a steady state creatinine    Lipase [[id number]]  (Abnormal) Collected: 01/19/25 0113    Lab Status: Final result Specimen: Blood from Arm, Right Updated: 01/19/25 0319     Lipase 9 u/L     HS Troponin I 4hr [[id number]]     Lab Status: No result Specimen: Blood     HS Troponin I 2hr [[id number]]     Lab Status: No result Specimen: Blood     HS Troponin 0hr (reflex protocol) [[id number]]  (Normal) Collected: 01/19/25 0113    Lab Status: Final result Specimen: Blood from Arm, Right Updated: 01/19/25 0148     hs TnI 0hr 4 ng/L     CBC and differential [[id number]]  (Abnormal) Collected: 01/19/25 0131    Lab Status: Final result Specimen: Blood from Arm, Right Updated: 01/19/25 0140     WBC 5.55 Thousand/uL      RBC 4.42 Million/uL      Hemoglobin 12.1 g/dL      Hematocrit 36.7 %      MCV 83 fL      MCH 27.4 pg      MCHC 33.0 g/dL      RDW 14.5 %      MPV 8.5 fL      Platelets 304 Thousands/uL      nRBC 0 /100 WBCs      Segmented % 83 %      Immature Grans % 0 %      Lymphocytes % 9 %      Monocytes % 8 %      Eosinophils Relative 0 %      Basophils Relative 0 %      Absolute Neutrophils 4.59 Thousands/µL      Absolute Immature Grans 0.01 Thousand/uL      Absolute Lymphocytes 0.47 Thousands/µL      Absolute Monocytes 0.45 Thousand/µL      Eosinophils Absolute 0.01 Thousand/µL      Basophils Absolute 0.02 Thousands/µL             XR chest 2 views    (Results Pending)   CT abdomen pelvis with contrast    (Results Pending)       Procedures    ED Medication and Procedure Management   Prior  to Admission Medications   Prescriptions Last Dose Informant Patient Reported? Taking?   LORazepam (ATIVAN) 2 mg tablet   No No   Sig: Take 1 tablet (2 mg total) by mouth every 6 (six) hours as needed for anxiety Max 5 times a day   PARoxetine (PAXIL) 30 mg tablet   No No   Sig: TAKE 2 TABLETS (60 MG TOTAL) BY MOUTH IN THE MORNING   QUEtiapine (SEROquel XR) 400 mg 24 hr tablet  Self No No   Sig: TAKE 1 TABLET (400 MG TOTAL) BY MOUTH DAILY AT BEDTIME   amLODIPine (NORVASC) 5 mg tablet  Self No No   Sig: TAKE 1 TABLET (5 MG TOTAL) BY MOUTH DAILY.   benzonatate (TESSALON) 200 MG capsule   No No   Sig: Take 1 capsule (200 mg total) by mouth 3 (three) times a day as needed for cough   buPROPion (WELLBUTRIN) 75 mg tablet   Yes No   Sig: Take 75 mg by mouth   celecoxib (CeleBREX) 200 mg capsule   Yes No   Sig: Take 200 mg by mouth daily   cephalexin (KEFLEX) 500 mg capsule   No No   Sig: Take 1 capsule (500 mg total) by mouth every 6 (six) hours for 7 days   levothyroxine 25 mcg tablet  Self No No   Sig: TAKE 1 TABLET BY MOUTH EVERY DAY   loratadine (CLARITIN) 10 mg tablet   No No   Sig: Take 1 tablet (10 mg total) by mouth daily   metoclopramide (REGLAN) 5 mg tablet   No No   Sig: TAKE 1 TABLET BY MOUTH 3 TIMES A DAY BEFORE MEALS.   omeprazole (PriLOSEC) 40 MG capsule   No No   Sig: Take 1 capsule (40 mg total) by mouth 2 (two) times a day   ondansetron (ZOFRAN) 4 mg tablet   No No   Sig: Take 1 tablet (4 mg total) by mouth every 8 (eight) hours as needed for nausea or vomiting   sucralfate (CARAFATE) 1 g tablet   No No   Sig: TAKE 1 TABLET (1 G TOTAL) BY MOUTH 3 (THREE) TIMES A DAY WITH MEALS      Facility-Administered Medications: None     Patient's Medications   Discharge Prescriptions    No medications on file     No discharge procedures on file.  ED SEPSIS DOCUMENTATION   Time reflects when diagnosis was documented in both MDM as applicable and the Disposition within this note       Time User Action Codes Description  Comment    1/19/2025  3:21 AM Yuni Arnold Add [R53.1] Weakness                  Yuni Arnold,   01/19/25 1821

## 2025-01-19 NOTE — QUICK NOTE
Post admit note  Patient is comfortable sitting in chair  No chest pain or shortness of breath  Awake alert oriented in no acute distress  Tolerating oral diet today with no further nausea vomiting  Lung clear to auscultation bilateral anteriorly  Heart positive S1-S2 no murmur   Abdomen soft nontender  Lower extremity with venous stasis    Ambulatory dysfunction, PT OT eval  Lack of social support,  consulted  Erosive esophagitis continue with PPI  Generalized weakness, PT OT eval  Chronic hyponatremia, monitor  Gastroparesis, continue with Reglan  Postconcussion syndrome  Schizoaffective disorder, continue with home meds  Hypertension, stable BP on Norvasc  Hyperlipidemia    See plan below

## 2025-01-19 NOTE — PLAN OF CARE
Problem: PAIN - ADULT  Goal: Verbalizes/displays adequate comfort level or baseline comfort level  Description: Interventions:  - Encourage patient to monitor pain and request assistance  - Assess pain using appropriate pain scale  - Administer analgesics based on type and severity of pain and evaluate response  - Implement non-pharmacological measures as appropriate and evaluate response  - Consider cultural and social influences on pain and pain management  - Notify physician/advanced practitioner if interventions unsuccessful or patient reports new pain  Outcome: Progressing     Problem: INFECTION - ADULT  Goal: Absence or prevention of progression during hospitalization  Description: INTERVENTIONS:  - Assess and monitor for signs and symptoms of infection  - Monitor lab/diagnostic results  - Monitor all insertion sites, i.e. indwelling lines, tubes, and drains  - Monitor endotracheal if appropriate and nasal secretions for changes in amount and color  - [place] appropriate cooling/warming therapies per order  - Administer medications as ordered  - Instruct and encourage patient and family to use good hand hygiene technique  - Identify and instruct in appropriate isolation precautions for identified infection/condition  Outcome: Progressing  Goal: Absence of fever/infection during neutropenic period  Description: INTERVENTIONS:  - Monitor WBC    Outcome: Progressing     Problem: SAFETY ADULT  Goal: Patient will remain free of falls  Description: INTERVENTIONS:  - Educate patient/family on patient safety including physical limitations  - Instruct patient to call for assistance with activity   - Consult OT/PT to assist with strengthening/mobility   - Keep Call bell within reach  - Keep bed low and locked with side rails adjusted as appropriate  - Keep care items and personal belongings within reach  - Initiate and maintain comfort rounds  - Make Fall Risk Sign visible to staff  - Offer Toileting every 2 Hours,  in advance of need  - Initiate/Maintain bed chairt alarm   - Obtain necessary fall risk management equipment: 2  Problem: Knowledge Deficit  Goal: Patient/family/caregiver demonstrates understanding of disease process, treatment plan, medications, and discharge instructions  Description: Complete learning assessment and assess knowledge base.  Interventions:  - Provide teaching at level of understanding  - Provide teaching via preferred learning methods  Outcome: Progressing     Problem: DISCHARGE PLANNING  Goal: Discharge to home or other facility with appropriate resources  Description: INTERVENTIONS:  - Identify barriers to discharge w/patient and caregiver  - Arrange for needed discharge resources and transportation as appropriate  - Identify discharge learning needs (meds, wound care, etc.)  - Arrange for interpretive services to assist at discharge as needed  - Refer to Case Management Department for coordinating discharge planning if the patient needs post-hospital services based on physician/advanced practitioner order or complex needs related to functional status, cognitive ability, or social support system  Outcome: Progressing     Problem: NEUROSENSORY - ADULT  Goal: Achieves stable or improved neurological status  Description: INTERVENTIONS  - Monitor and report changes in neurological status  - Monitor vital signs such as temperature, blood pressure, glucose, and any other labs ordered   - Initiate measures to prevent increased intracranial pressure  - Monitor for seizure activity and implement precautions if appropriate      Outcome: Progressing  Goal: Remains free of injury related to seizures activity  Description: INTERVENTIONS  - Maintain airway, patient safety  and administer oxygen as ordered  - Monitor patient for seizure activity, document and report duration and description of seizure to physician/advanced practitioner  - If seizure occurs,  ensure patient safety during seizure  - Reorient  patient post seizure  - Seizure pads on all 4 side rails  - Instruct patient/family to notify RN of any seizure activity including if an aura is experienced  - Instruct patient/family to call for assistance with activity based on nursing assessment  - Administer anti-seizure medications if ordered    Outcome: Progressing  Goal: Achieves maximal functionality and self care  Description: INTERVENTIONS  - Monitor swallowing and airway patency with patient fatigue and changes in neurological status  - Encourage and assist patient to increase activity and self care.   - Encourage visually impaired, hearing impaired and aphasic patients to use assistive/communication devices  Outcome: Progressing     Problem: GASTROINTESTINAL - ADULT  Goal: Minimal or absence of nausea and/or vomiting  Description: INTERVENTIONS:  - Administer IV fluids if ordered to ensure adequate hydration  - Maintain NPO status until nausea and vomiting are resolved  - Nasogastric tube if ordered  - Administer ordered antiemetic medications as needed  - Provide nonpharmacologic comfort measures as appropriate  - Advance diet as tolerated, if ordered  - Consider nutrition services referral to assist patient with adequate nutrition and appropriate food choices  Outcome: Progressing  Goal: Maintains or returns to baseline bowel function  Description: INTERVENTIONS:  - Assess bowel function  - Encourage oral fluids to ensure adequate hydration  - Administer IV fluids if ordered to ensure adequate hydration  - Administer ordered medications as needed  - Encourage mobilization and activity  - Consider nutritional services referral to assist patient with adequate nutrition and appropriate food choices  Outcome: Progressing  Goal: Maintains adequate nutritional intake  Description: INTERVENTIONS:  - Monitor percentage of each meal consumed  - Identify factors contributing to decreased intake, treat as appropriate  - Assist with meals as needed  - Monitor I&O,  weight, and lab values if indicated  - Obtain nutrition services referral as needed  Outcome: Progressing  Goal: Oral mucous membranes remain intact  Description: INTERVENTIONS  - Assess oral mucosa and hygiene practices  - Implement preventative oral hygiene regimen  - Implement oral medicated treatments as ordered  - Initiate Nutrition services referral as needed  Outcome: Progressing     Problem: GENITOURINARY - ADULT  Goal: Maintains or returns to baseline urinary function  Description: INTERVENTIONS:  - Assess urinary function  - Encourage oral fluids to ensure adequate hydration if ordered  - Administer IV fluids as ordered to ensure adequate hydration  - Administer ordered medications as needed  - Offer frequent toileting  - Follow urinary retention protocol if ordered  Outcome: Progressing  Goal: Absence of urinary retention  Description: INTERVENTIONS:  - Assess patient’s ability to void and empty bladder  - Monitor I/O  - Bladder scan as needed  - Discuss with physician/AP medications to alleviate retention as needed  - Discuss catheterization for long term situations as appropriate  Outcome: Progressing     Problem: METABOLIC, FLUID AND ELECTROLYTES - ADULT  Goal: Electrolytes maintained within normal limits  Description: INTERVENTIONS:  - Monitor labs and assess patient for signs and symptoms of electrolyte imbalances  - Administer electrolyte replacement as ordered  - Monitor response to electrolyte replacements, including repeat lab results as appropriate  - Instruct patient on fluid and nutrition as appropriate  Outcome: Progressing  Goal: Fluid balance maintained  Description: INTERVENTIONS:  - Monitor labs   - Monitor I/O and WT  - Instruct patient on fluid and nutrition as appropriate  - Assess for signs & symptoms of volume excess or deficit  Outcome: Progressing  Goal: Glucose maintained within target range  Description: INTERVENTIONS:  - Monitor Blood Glucose as ordered  - Assess for signs and  symptoms of hyperglycemia and hypoglycemia  - Administer ordered medications to maintain glucose within target range  - Assess nutritional intake and initiate nutrition service referral as needed  Outcome: Progressing     - Apply yellow socks and bracelet for high fall risk patients  - Consider moving patient to room near nurses station  Outcome: Progressing  Goal: Maintain or return to baseline ADL function  Description: INTERVENTIONS:  -  Assess patient's ability to carry out ADLs; assess patient's baseline for ADL function and identify physical deficits which impact ability to perform ADLs (bathing, care of mouth/teeth, toileting, grooming, dressing, etc.)  - Assess/evaluate cause of self-care deficits   - Assess range of motion  - Assess patient's mobility; develop plan if impaired  - Assess patient's need for assistive devices and provide as appropriate  - Encourage maximum independence but intervene and supervise when necessary  - Involve family in performance of ADLs  - Assess for home care needs following discharge   - Consider OT consult to assist with ADL evaluation and planning for discharge  - Provide patient education as appropriate  Outcome: Progressing  Goal: Maintains/Returns to pre admission functional level  Description: INTERVENTIONS:  - Perform AM-PAC 6 Click Basic Mobility/ Daily Activity assessment daily.  - Set and communicate daily mobility goal to care team and patient/family/caregiver.   - Collaborate with rehabilitation services on mobility goals if consulted  - Perform Range of Motion 2 times a day.  - Reposition patient every 2 hours.  - Dangle patient 2 times a day  - Stand patient 2 times a day  - Ambulate patient 2 times a day  - Out of bed to chair 2 times a day   - Out of bed for meals 2 times a day  - Out of bed for toileting  - Record patient progress and toleration of activity level   Outcome: Progressing
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
## 2025-01-19 NOTE — ASSESSMENT & PLAN NOTE
Reports difficulty walking due to deconditioning and b/l leg pain  Treated with course of Keflex for LE cellulitis after ED presentation last week. LEs with no current evidence of infection/deformity/swelling with normal strength and range of motion  PT/OT evaluations

## 2025-01-19 NOTE — PLAN OF CARE
Problem: OCCUPATIONAL THERAPY ADULT  Goal: Performs self-care activities at highest level of function for planned discharge setting.  See evaluation for individualized goals.  Description: Treatment Interventions: ADL retraining, Functional transfer training, Endurance training, Cognitive reorientation, Patient/family training, Equipment evaluation/education, Compensatory technique education, Continued evaluation, Energy conservation, Activityengagement          See flowsheet documentation for full assessment, interventions and recommendations.   Outcome: Progressing  Note: Limitation: Decreased ADL status, Decreased Safe judgement during ADL, Decreased endurance, Decreased self-care trans, Decreased high-level ADLs  Prognosis: Good  Assessment: Pt is a 64 y.o. female seen for OT evaluation s/p admission to [facility] on 1/18/2025 due to Ambulatory dysfunction.  Pt  has a past medical history of Anemia, Anxiety, Arthritis, Back pain at L4-L5 level, Schreiber esophagus, Bulimia, Colon polyp, Disease of thyroid gland, GERD (gastroesophageal reflux disease), Hearing loss in left ear, History of transfusion, Hyperlipidemia, Hypertension, Hypothyroidism, Leukopenia, NMS (neuroleptic malignant syndrome), Pneumonia, RA (rheumatoid arthritis) ([facility]), Sciatica, Seizure (HCC), Skin spots, red, Synovial cyst of lumbar spine, Vertigo, Wears dentures, and Weight gain.  Pt with active OT evaluation/treatment and activity orders. Pt reports living alone in 1[place] w/ 2STE. PTA, Pt was I w/ ADL/IADL and functional mobility, was driving and was not using DME at baseline. Pt agreeable and willing to participate in OT evaluation. Pt was greeted supine and left OOB in chair w/ alarm activated and all needs within reach. During evaluation, pt is Supervision bed mobility, transfers and UB ADLs; Min A functional mobility and LB ADLs. Pt currently presents with impairments in the following categories -steps to enter environment,  limited home support, difficulty performing ADLS, and difficulty performing IADLS  activity tolerance, endurance, standing balance/tolerance, sitting balance/tolerance, memory, and safety . These impairments, as well as pt's fatigue, impulsivity, risk for falls, and home environment  limit pt's ability to safely engage in all baseline areas of occupation, includingeating, grooming, bathing, dressing, toileting, functional mobility/transfers, community mobility, laundry , driving, house maintenance, medication management, meal prep, cleaning, social participation , and leisure activities . The patient's raw score on the -PAC Daily Activity Inpatient Short Form is 19. A raw score of greater than or equal to 19 suggests the patient may benefit from discharge to home. Please refer to the recommendation of the Occupational Therapist for safe discharge planning.    Pt would benefit from continued acute OT services throughout hospital course in order to maximize Pt's independence and overall occupational performance. Plan for OT interventions 2-3x per week. From OT standpoint,  recommend Level III (Minimum Resource Intensity) upon d/c when pt medically stable to d/c from acute care. Will continue to follow.     Rehab Resource Intensity Level, OT: III (Minimum Resource Intensity)

## 2025-01-19 NOTE — PLAN OF CARE
Problem: PHYSICAL THERAPY ADULT  Goal: Performs mobility at highest level of function for planned discharge setting.  See evaluation for individualized goals.  Description: Treatment/Interventions: ADL retraining, Functional transfer training, LE strengthening/ROM, Therapeutic exercise, Endurance training, Patient/family training, Equipment eval/education, Bed mobility, Gait training, Spoke to nursing, Spoke to case management, OT, Elevations, Cognitive reorientation  Equipment Recommended: Walker (owns)       See flowsheet documentation for full assessment, interventions and recommendations.  Outcome: Progressing  Note: Prognosis: Good  Problem List: Decreased strength, Decreased endurance, Impaired balance, Decreased mobility, Pain, Decreased cognition, Decreased safety awareness, Impaired judgement  Assessment: Pt is a 64 y.o. female seen for a high complexity PT evaluation due to Ongoing medical management for primary dx, Increased reliance on more restrictive AD compared to baseline, Decreased activity tolerance compared to baseline, Fall risk, Increased assistance needed from caregiver at current time, Cog status. Patient is s/p admit to [facility] on 1/18/2025 for Hematemesis (K92.0)  Hiatal hernia (K44.9)  Gastritis (K29.70)  Hyponatremia (E87.1)  Vomiting (R11.10)  Weakness (R53.1)  Esophagitis (K20.90)  Schreiber esophagus (K22.70). Patient  has a past medical history of Anemia, Anxiety, Arthritis, Back pain at L4-L5 level, Schreiber esophagus, Bulimia, Colon polyp, Disease of thyroid gland, GERD (gastroesophageal reflux disease), Hearing loss in left ear, History of transfusion, Hyperlipidemia, Hypertension, Hypothyroidism, Leukopenia, NMS (neuroleptic malignant syndrome), Pneumonia, RA (rheumatoid arthritis) ([facility]), Sciatica, Seizure (HCC), Skin spots, red, Synovial cyst of lumbar spine, Vertigo, Wears dentures, and Weight gain..     PT now consulted to assess functional mobility and needs for  safe d/c planning. Prior to admission, pt independent with functional mobility, independent ADLs, and independent IADLs. Personal factors affecting status include hx of falls, fall risk, steps to enter home, limited caregiver/social support, cognitive deficits, and medical status     Currently pt requires supervision for bed mobility, supervision for functional transfers with one hand hold ; minimal assistance x1 for ambulation with one hand hold. Pt presents functioning below baseline and w/ overall mobility deficits 2* to: decreased strength, decreased endurance, decreased mobility, impaired balance. These impairments place pt at risk for falls.     Pt will continue to benefit from skilled PT interventions to address stated impairments; to maximize functional potential; for ongoing pt/family education; and DME needs. The patient's AM-PAC Basic Mobility Inpatient Short Form Raw Score Is 17. PT is currently recommending Level 3 - Minimum Resource Intensity on d/c from hospital. Will continue to follow as able.  Barriers to Discharge: Decreased caregiver support     Rehab Resource Intensity Level, PT: III (Minimum Resource Intensity)    See flowsheet documentation for full assessment.

## 2025-01-19 NOTE — PHYSICAL THERAPY NOTE
Physical Therapy Evaluation     Patient's Name: Irene Plascencia    Admitting Diagnosis  Hematemesis [K92.0]  Hiatal hernia [K44.9]  Gastritis [K29.70]  Hyponatremia [E87.1]  Vomiting [R11.10]  Weakness [R53.1]  Esophagitis [K20.90]  Schreiber esophagus [K22.70]    Problem List  Patient Active Problem List   Diagnosis    Multifocal pneumonia    Chronic hyponatremia    Lumbar radiculopathy    DDD (degenerative disc disease), lumbar    Spondylolisthesis at L4-L5 level    Localized osteoporosis with current pathological fracture with routine healing    Spinal stenosis of lumbar region with neurogenic claudication    Lumbar spondylosis    T12 compression fracture (HCC)    Synovial cyst of lumbar facet joint    Anxiety    Bulimia nervosa in remission    Constipation    Acquired hypothyroidism    Other fatigue    Serotonin syndrome    Schizoaffective disorder (HCC)    Psychiatric disorder    Dermal hypersensitivity reaction    Primary hypertension    Right hip pain    Chronic bilateral low back pain without sciatica    Severe iron deficiency anemia     Preop exam for internal medicine    NMS (neuroleptic malignant syndrome)    Elevated lactic acid level    Hypokalemia    Fall    Esophagitis    Problem related to living arrangement    Iron deficiency anemia    Multiple excoriations    Rash    Cellulitis of left upper extremity    Melena    Hiatal hernia with GERD without esophagitis    Polyp of colon    Word finding difficulty    SIRS (systemic inflammatory response syndrome) ([facility])    Hyperlipidemia    S/P tooth extraction    Upper GI bleed    Pruritus    Self neglect    Microcytic anemia    Schreiber's esophagus    Acute encephalopathy    Thrombocytosis    Abdominal pain    Stress incontinence    Ulcer of toe, chronic, left, with unspecified severity ([facility])    Nutritional anemia    Headache    Acute hypoxic respiratory failure (HCC)    Sepsis without acute organ dysfunction (HCC)    Post concussion syndrome     Neutropenia (HCC)    Vitamin B12 deficiency (dietary) anemia    Folate deficiency    Acquired absence of other toe(s), unspecified side (HCC)    Osteomyelitis, unspecified site, unspecified type (HCC)    Elevated vitamin B12 level    Iron deficiency    Heartburn    Gastroparesis    Leukopenia    Gastroesophageal reflux disease with esophagitis, Schreiber's esophagus and gastritis    Problems related to living alone    Lack of social support    Generalized weakness    Ambulatory dysfunction       Past Medical History  Past Medical History:   Diagnosis Date    Anemia     Anxiety     Arthritis     Back pain at L4-L5 level     Schreiber esophagus     Bulimia     Colon polyp     Disease of thyroid gland     hypothyroid    GERD (gastroesophageal reflux disease)     Hearing loss in left ear     History of transfusion     Hyperlipidemia     Hypertension     Hypothyroidism     Leukopenia     NMS (neuroleptic malignant syndrome) 01/03/2020    Pneumonia     RA (rheumatoid arthritis) (HCC)     in feet    Sciatica     Seizure ([facility])     due to medication mix up 8/2018 only one historically    Skin spots, red     lower right arm - poss from cat- no s/s infection    Synovial cyst of lumbar spine     Vertigo     Wears dentures     full set    Weight gain        Past Surgical History  Past Surgical History:   Procedure Laterality Date    BONE MARROW BIOPSY      BREAST SURGERY      enlargement with implants    CLOSED REDUCTION DISTAL FEMUR FRACTURE      COLONOSCOPY      COSMETIC SURGERY      DENTAL SURGERY      HIP ARTHROPLASTY Right 01/02/2020    Procedure: REVISION TOTAL HIP ARTHROPLASTY WITH REPAIR OF PARIPROSTHETIC FRACTURE - POSTERIOR APPROACH;  Surgeon: Dilan Pedersen MD;  Location:  MAIN OR;  Service: Orthopedics    WY AMPUTATION METATARSAL W/TOE SINGLE Left 04/07/2023    Procedure: AMPUTATION TOE 4th;  Surgeon: Conner Bartlett DPM;  Location:  MAIN OR;  Service: Podiatry    WY ARTHRP ACETBLR/PROX FEM PROSTC AGRFT/ALGRFT Right  12/11/2019    Procedure: ARTHROPLASTY HIP TOTAL ANTERIOR;  Surgeon: Dilan Pedersen MD;  Location: [facility];  Service: Orthopedics    TX ESOPHAGOGASTRODUODENOSCOPY TRANSORAL DIAGNOSTIC N/A 05/11/2021    Procedure: ESOPHAGOGASTRODUODENOSCOPY (EGD);  Surgeon: David Cedeño MD;  Location: [facility];  Service: General    TX LAPS RPR PARAESPHGL HRNA INCL FUNDPLSTY W/MESH N/A 05/11/2021    Procedure: REPAIR HERNIA PARAESOPHAGEAL  LAPAROSCOPIC;  Surgeon: David Cedeño MD;  Location: [facility];  Service: General    TX UNLISTED PROCEDURE ESOPHAGUS N/A 05/11/2021    Procedure: FUNDOPLICATION TRANSORAL INCISIONLESS;  Surgeon: Brad Cadet MD;  Location: [facility];  Service: Gastroenterology    RHINOPLASTY      SINUS SURGERY      TOE AMPUTATION Left     2023 4th toe    TRANSORAL INCISIONLESS FUNDOPLICATION (TIF)  05/11/2021 01/19/25 0902   PT Last Visit   PT Visit Date 01/19/25   Note Type   Note type Evaluation   Pain Assessment   Pain Assessment Tool 0-10   Pain Score 4   Pain Location/Orientation Location: Back   Pain Onset/Description Onset: Ongoing;Frequency: Constant/Continuous;Descriptor: Discomfort   Effect of Pain on Daily Activities limits comfort and mobility   Patient's Stated Pain Goal No pain   Hospital Pain Intervention(s) Repositioned;Ambulation/increased activity;Emotional support   Restrictions/Precautions   Weight Bearing Precautions Per Order No   Other Precautions Fall Risk;Bed Alarm;Chair Alarm;Pain;Cognitive   Home Living   Type of Home House   Home Layout One level;Access;Stairs to enter with rails  (2STE)   Bathroom Shower/Tub Tub/shower unit   Bathroom Toilet Raised   Bathroom Equipment Grab bars in shower   Bathroom Accessibility Accessible   Home Equipment Walker;Cane  (neither used PTA)   Prior Function   Level of [place] Independent with ADLs;Independent with functional mobility;Independent with IADLS   Lives With Alone   Receives Help From   (limited support)   IADLs  Independent with driving;Independent with meal prep;Independent with medication management   Falls in the last 6 months 1 to 4  (x2)   Vocational Retired   General   Family/Caregiver Present No   Cognition   Overall Cognitive Status Impaired  (higher level deficits noted)   Arousal/Participation Alert   Orientation Level Oriented X4   Memory Decreased recall of precautions   Following Commands Follows one step commands without difficulty   Comments Patient pleasant and cooperative. cues needed for safety awareness   Subjective   Subjective Patient agreeable to PT eval   RUE Assessment   RUE Assessment WFL   LUE Assessment   LUE Assessment WFL   RLE Assessment   RLE Assessment X   Strength RLE   RLE Overall Strength 4-/5   LLE Assessment   LLE Assessment X   Strength LLE   LLE Overall Strength 4-/5   Bed Mobility   Supine to Sit 5  Supervision   Additional items Impulsive;Verbal cues   Transfers   Sit to Stand 5  Supervision   Additional items Impulsive;Verbal cues   Stand to Sit 5  Supervision   Additional items Impulsive;Verbal cues   Toilet transfer 5  Supervision   Additional items Verbal cues;Standard toilet   Ambulation/Elevation   Gait pattern Improper Weight shift;Forward Flexion;Decreased foot clearance   Gait Assistance 4  Minimal assist   Additional items Assist x 1;Verbal cues   Assistive Device   (HHA)   Distance 10'x2   Ambulation/Elevation Additional Comments recommend RW   Balance   Static Sitting Fair   Dynamic Sitting Fair   Static Standing Fair   Dynamic Standing Fair -   Ambulatory Poor +   Endurance Deficit   Endurance Deficit Yes   Endurance Deficit Description fatigue, weakness   Activity Tolerance   Activity Tolerance Patient limited by fatigue   Medical Staff Made Aware OT   Nurse Made Aware RN cleared   Assessment   Prognosis Good   Problem List Decreased strength;Decreased endurance;Impaired balance;Decreased mobility;Pain;Decreased cognition;Decreased safety awareness;Impaired judgement    Assessment Pt is a 64 y.o. female seen for a high complexity PT evaluation due to Ongoing medical management for primary dx, Increased reliance on more restrictive AD compared to baseline, Decreased activity tolerance compared to baseline, Fall risk, Increased assistance needed from caregiver at current time, Cog status. Patient is s/p admit to [facility] on 1/18/2025 for Hematemesis (K92.0)  Hiatal hernia (K44.9)  Gastritis (K29.70)  Hyponatremia (E87.1)  Vomiting (R11.10)  Weakness (R53.1)  Esophagitis (K20.90)  Schreiber esophagus (K22.70). Patient  has a past medical history of Anemia, Anxiety, Arthritis, Back pain at L4-L5 level, Schreiber esophagus, Bulimia, Colon polyp, Disease of thyroid gland, GERD (gastroesophageal reflux disease), Hearing loss in left ear, History of transfusion, Hyperlipidemia, Hypertension, Hypothyroidism, Leukopenia, NMS (neuroleptic malignant syndrome), Pneumonia, RA (rheumatoid arthritis) ([facility]), Sciatica, Seizure (HCC), Skin spots, red, Synovial cyst of lumbar spine, Vertigo, Wears dentures, and Weight gain..     PT now consulted to assess functional mobility and needs for safe d/c planning. Prior to admission, pt independent with functional mobility, independent ADLs, and independent IADLs. Personal factors affecting status include hx of falls, fall risk, steps to enter home, limited caregiver/social support, cognitive deficits, and medical status     Currently pt requires supervision for bed mobility, supervision for functional transfers with one hand hold ; minimal assistance x1 for ambulation with one hand hold. Pt presents functioning below baseline and w/ overall mobility deficits 2* to: decreased strength, decreased endurance, decreased mobility, impaired balance. These impairments place pt at risk for falls.     Pt will continue to benefit from skilled PT interventions to address stated impairments; to maximize functional potential; for ongoing pt/family education;  and DME needs. The patient's AM-PAC Basic Mobility Inpatient Short Form Raw Score Is 17. PT is currently recommending Level 3 - Minimum Resource Intensity on d/c from hospital. Will continue to follow as able.   Barriers to Discharge Decreased caregiver support   Goals   Patient Goals to feel better   STG Expiration Date 02/02/25   Short Term Goal #1 In 14 days, patient will 1) increase strength in BUE/BLE by 1/2 to 1 full grade for increased strength and stability needed for functional mobility 2) improve bed mobility to MI for improved mobility and decreased need for assist 3) sit EOB x30' with MI to facilitate trunk stability and safety for completion of ADL tasks 4) increase functional transfers to MI for improved safety and functional mobility 5) ambulate 250ft with MI using LRAD vs no AD for increased endurance and safety ambulating home and community environments 6) improve balance by 1 grade for improved safety and stability and decreased risk for falls. 7) ascend/descend at least 2 stairs using HR with MI in order to safely access home environment   PT Treatment Day 0   Plan   Treatment/Interventions ADL retraining;Functional transfer training;LE strengthening/ROM;Therapeutic exercise;Endurance training;Patient/family training;Equipment eval/education;Bed mobility;Gait training;Spoke to nursing;Spoke to case management;OT;Elevations;Cognitive reorientation   PT Frequency 3-5x/wk   Discharge Recommendation   Rehab Resource Intensity Level, PT III (Minimum Resource Intensity)   Equipment Recommended Walker  (owns)   AM-PAC Basic Mobility Inpatient   Turning in Flat Bed Without Bedrails 3   Lying on Back to Sitting on Edge of Flat Bed Without Bedrails 3   Moving Bed to Chair 3   Standing Up From Chair Using Arms 3   Walk in Room 3   Climb 3-5 Stairs With Railing 2   Basic Mobility Inpatient Raw Score 17   Basic Mobility Standardized Score 39.67   [facility] Highest Level Of Mobility   [facility] Goal 5: Stand  one or more mins   [place] Achieved 6: Walk 10 steps or more   Modified [place] Scale   Modified [place] Scale 4   End of Consult   Patient Position at End of Consult All needs within reach;Bed/Chair alarm activated;Bedside chair         Yuridia Torres, PT, DPT

## 2025-01-19 NOTE — CASE MANAGEMENT
Case Management Assessment & Discharge Planning Note    Patient name Irene Plascencia  Location [facility]01 MRN [id number]  : 1960 Date 2025       Current Admission Date: 2025  Current Admission Diagnosis:Ambulatory dysfunction   Patient Active Problem List    Diagnosis Date Noted Date Diagnosed    Problems related to living alone 2025     Lack of social support 2025     Generalized weakness 2025     Ambulatory dysfunction 2025     Leukopenia 2024     Heartburn 2024     Gastroparesis 2024     Elevated vitamin B12 level 2024     Iron deficiency 2024     Acquired absence of other toe(s), unspecified side ([facility]) 2024     Osteomyelitis, unspecified site, unspecified type ([facility]) 2024     Vitamin B12 deficiency (dietary) anemia 2023     Folate deficiency 2023     Neutropenia ([facility]) 2023     Post concussion syndrome 2023     Sepsis without acute organ dysfunction ([facility]) 2023     Acute hypoxic respiratory failure ([facility]) 2023     Headache 2023     Nutritional anemia 2023     Stress incontinence 2023     Ulcer of toe, chronic, left, with unspecified severity ([facility]) 2023     Acute encephalopathy 2022     Thrombocytosis 2022     Abdominal pain 2022     Schreiber's esophagus 2022     Microcytic anemia 2022     Self neglect 2022     Pruritus 2022     Upper GI bleed 01/10/2022     S/P tooth extraction 10/22/2021     SIRS (systemic inflammatory response syndrome) (HCC) 2021     Hyperlipidemia 2021     Word finding difficulty 2021     Hiatal hernia with GERD without esophagitis 2021     Polyp of colon 2021     Melena 2020     Cellulitis of left upper extremity 2020     Gastroesophageal reflux disease with esophagitis, Schreiber's esophagus and gastritis 2020     Rash 2020     Iron  deficiency anemia 09/21/2020     Multiple excoriations 09/21/2020     Fall 01/27/2020     Esophagitis 01/27/2020     Problem related to living arrangement 01/27/2020     Hypokalemia      NMS (neuroleptic malignant syndrome) 01/03/2020     Elevated lactic acid level 01/03/2020     Preop exam for internal medicine 11/18/2019     Severe iron deficiency anemia  11/14/2019     Chronic bilateral low back pain without sciatica 11/01/2019     Right hip pain 07/15/2019     Primary hypertension 06/12/2019     Dermal hypersensitivity reaction 11/08/2018     Psychiatric disorder 08/21/2018     Schizoaffective disorder (HCC) 08/16/2018     Serotonin syndrome 08/13/2018     Other fatigue 06/19/2018     Spinal stenosis of lumbar region with neurogenic claudication 06/15/2018     Lumbar spondylosis 06/15/2018     T12 compression fracture (HCC) 06/15/2018     Synovial cyst of lumbar facet joint 06/15/2018     Localized osteoporosis with current pathological fracture with routine healing 05/25/2018     Lumbar radiculopathy 03/19/2018     DDD (degenerative disc disease), lumbar 03/19/2018     Spondylolisthesis at L4-L5 level 03/19/2018     Anxiety 10/31/2016     Acquired hypothyroidism 10/31/2016     Multifocal pneumonia 09/25/2016     Chronic hyponatremia 09/25/2016     Constipation 04/10/2014     Bulimia nervosa in remission 03/19/2014       LOS (days): 0  Geometric Mean LOS (GMLOS) (days):   Days to GMLOS:     OBJECTIVE:  PATIENT READMITTED TO HOSPITAL  Risk of Unplanned Readmission Score: 30.45         Current admission status: Inpatient  Referral Reason: Other    Preferred Pharmacy:   [facility]/pharmacy #[id number] - [place], [place] - [address]  [address] [place] [address]  Phone: [phone] Fax: [phone]    [facility] [place] - [place] - [address]  [address]  [place] [address]  Phone: [phone] Fax: [phone]    Primary Care Provider: Raheem Cain MD    Primary  Insurance: [facility]  Secondary Insurance:     ASSESSMENT:  Active Health Care Proxies       Darrin Daniel [facility] Representative - Life Partner   Primary Phone: [phone] (Home)                    Readmission Root Cause  30 Day Readmission: Yes  During your hospital stay, did someone (provider, nurse, ) explain your care to you in a way you could understand?: Yes  Did you feel medically stable to leave the hospital?: Yes  Were you able to pay for your medication at the pharmacy?: Yes  Did you have reliable transportation to take you to your appointments?: No  During previous admission, was a post-acute recommendation made?: No  Patient was readmitted due to: ambulatory dysfunction    Patient Information  Admitted from:: Home  Mental Status: Alert  During Assessment patient was accompanied by: Not accompanied during assessment  Assessment information provided by:: Patient  Primary Caregiver: Self  Support Systems: Self  County of Residence: [place]  What city do you live in?: [place]  Home entry access options. Select all that apply.: Stairs  Number of steps to enter home.: 2  [facility] of Current Residence: [facility]  Living Arrangements: Lives Alone  Is patient a ?: No    Activities of Daily Living Prior to Admission  Functional Status: Independent  Completes ADLs independently?: Yes  Ambulates independently?: Yes  Does patient use assisted devices?: No  Does patient currently own DME?: No  Does patient have a history of Outpatient Therapy (PT/OT)?: Yes  Does the patient have a history of Short-Term Rehab?: Yes  Does patient have a history of HHC?: No  Does patient currently have HHC?: No         Patient Information Continued  Income Source: Unemployed  Does patient have prescription coverage?: Yes  Does patient receive dialysis treatments?: No  Does patient have a history of substance abuse?: No  Does patient have a history of Mental Health Diagnosis?: Yes  (MDD, Anxiety, schizoaffective disorder)  Is patient receiving treatment for mental health?: Yes  Has patient received inpatient treatment related to mental health in the last 2 years?: No         Means of Transportation  Means of Transport to Appts:: Public Transportation - Lyft          DISCHARGE DETAILS:    Discharge planning discussed with:: Patient  Freedom of Choice: Yes  Comments - Freedom of Choice: Agreeable to blanket referral for HHC  CM contacted family/caregiver?: No- see comments (declined, states her only person is albert in [facility])  Were Treatment Team discharge recommendations reviewed with patient/caregiver?: Yes  Did patient/caregiver verbalize understanding of patient care needs?: Yes  Were patient/caregiver advised of the risks associated with not following Treatment Team discharge recommendations?: Yes       Other Referral/Resources/Interventions Provided:  Interventions: HHC  Referral Comments: CM sent a blanket referral for HHC at d/c         Treatment Team Recommendation: Home with Home Health Care      Additional Comments: CM met with pt at [facility] to complete Open. Pt resides alone in a ranch style home.  Pt states she came to the hosptial because she felt she was having trouble breathing and thought she had cellulitis in her legs which needed care.  Pt requestd CM arrange HHC for her.  CM sent blanket referral for HHC. Pt also states she does have her keys to get into her home at d/c.

## 2025-01-19 NOTE — ASSESSMENT & PLAN NOTE
Reports generalized weakness over the past few weeks, a/w VANG and difficulty ambulating short distances. Suspect in large part due to deconditioning  No hypoxia or tachypnea. No fevers. Troponin and ekg negative. CXR appears unremarkable, follow up official read  Echo 9/28 EF 60%, moderate tricuspid regurgitation  PT/OT consults, may need rehab vs [facility]

## 2025-01-19 NOTE — OCCUPATIONAL THERAPY NOTE
Occupational Therapy Evaluation     Patient Name: Irene Plascencia  Today's Date: 1/19/2025  Problem List  Principal Problem:    Ambulatory dysfunction  Active Problems:    Chronic hyponatremia    Schizoaffective disorder (HCC)    Primary hypertension    Post concussion syndrome    Gastroparesis    Gastroesophageal reflux disease with esophagitis, Schreiber's esophagus and gastritis    Lack of social support    Generalized weakness    Past Medical History  Past Medical History:   Diagnosis Date    Anemia     Anxiety     Arthritis     Back pain at L4-L5 level     Schreiber esophagus     Bulimia     Colon polyp     Disease of thyroid gland     hypothyroid    GERD (gastroesophageal reflux disease)     Hearing loss in left ear     History of transfusion     Hyperlipidemia     Hypertension     Hypothyroidism     Leukopenia     NMS (neuroleptic malignant syndrome) 01/03/2020    Pneumonia     RA (rheumatoid arthritis) ([facility])     in feet    Sciatica     Seizure ([facility])     due to medication mix up 8/2018 only one historically    Skin spots, red     lower right arm - poss from cat- no s/s infection    Synovial cyst of lumbar spine     Vertigo     Wears dentures     full set    Weight gain      Past Surgical History  Past Surgical History:   Procedure Laterality Date    BONE MARROW BIOPSY      BREAST SURGERY      enlargement with implants    CLOSED REDUCTION DISTAL FEMUR FRACTURE      COLONOSCOPY      COSMETIC SURGERY      DENTAL SURGERY      HIP ARTHROPLASTY Right 01/02/2020    Procedure: REVISION TOTAL HIP ARTHROPLASTY WITH REPAIR OF PARIPROSTHETIC FRACTURE - POSTERIOR APPROACH;  Surgeon: Dilan Pedersen MD;  Location:  MAIN OR;  Service: Orthopedics    AR AMPUTATION METATARSAL W/TOE SINGLE Left 04/07/2023    Procedure: AMPUTATION TOE 4th;  Surgeon: Conner Bartlett DPM;  Location:  MAIN OR;  Service: Podiatry    AR ARTHRP ACETBLR/PROX FEM PROSTC AGRFT/ALGRFT Right 12/11/2019    Procedure: ARTHROPLASTY HIP TOTAL  ANTERIOR;  Surgeon: Dilan Pedersen MD;  [facility]: [facility];  Service: Orthopedics    ME ESOPHAGOGASTRODUODENOSCOPY TRANSORAL DIAGNOSTIC N/A 05/11/2021    Procedure: ESOPHAGOGASTRODUODENOSCOPY (EGD);  Surgeon: David Cedeño MD;  Location: [facility];  Service: General    ME LAPS RPR PARAESPHGL HRNA INCL FUNDPLSTY W/MESH N/A 05/11/2021    Procedure: REPAIR HERNIA PARAESOPHAGEAL  LAPAROSCOPIC;  Surgeon: David Cedeño MD;  [facility]: [facility];  Service: General    ME UNLISTED PROCEDURE ESOPHAGUS N/A 05/11/2021    Procedure: FUNDOPLICATION TRANSORAL INCISIONLESS;  Surgeon: Brad Cadet MD;  [facility]: [facility];  Service: Gastroenterology    RHINOPLASTY      SINUS SURGERY      TOE AMPUTATION Left     2023 4th toe    TRANSORAL INCISIONLESS FUNDOPLICATION (TIF)  05/11/2021 01/19/25 0903   OT Last Visit   OT Visit Date 01/19/25   Note Type   Note type Evaluation   Pain Assessment   Pain Assessment Tool 0-10   Pain Score 4   Pain Location/Orientation Location: Back   Patient's Stated Pain Goal No pain   Hospital Pain Intervention(s) Repositioned;Ambulation/increased activity   Restrictions/Precautions   Weight Bearing Precautions Per Order No   Other Precautions Fall Risk;Pain;Cognitive;Chair Alarm;Bed Alarm   Home Living   Type of Home House   Home Layout One level;Stairs to enter with rails  (2STE)   Bathroom Shower/Tub Tub/shower unit   Bathroom Toilet Raised   Bathroom Equipment Grab bars in shower   Home Equipment Walker;Cane  (not used PTA)   Additional Comments Pt reports living alone in 1SH w/ 2STE. Pt denies use of AD PTA   Prior Function   Level of [place] Independent with ADLs;Independent with functional mobility;Independent with IADLS   Lives With Alone   Receives Help From   (limited social support)   IADLs Independent with driving;Independent with meal prep;Independent with medication management   Falls in the last 6 months 1 to 4  (2 falls)   Vocational Retired   Comments Pt reports being  "completely independent at baseline though has had increased difficulty since previous hospital stay.  (+) . Limited social support.   Lifestyle   Autonomy PTA, pt reports I w/ ADL, IADL, functional mobility though has had increased difficulty since previous hospital stay   Reciprocal Relationships Limited social support   Service to Others Retired professor   Intrinsic Gratification Enjoys reading   General   Family/Caregiver Present No   Subjective   Subjective \"I want home health\"   ADL   Where Assessed Edge of bed   Eating Assistance 6  Modified independent   Grooming Assistance 6  Modified Independent   UB Bathing Assistance 5  Supervision/Setup   LB Bathing Assistance 4  Minimal Assistance   UB Dressing Assistance 5  Supervision/Setup   LB Dressing Assistance 4  Minimal Assistance   Toileting Assistance  5  Supervision/Setup   Functional Assistance 4  Minimal Assistance   Bed Mobility   Supine to Sit 5  Supervision   Additional items Impulsive;Verbal cues   Sit to Supine Unable to assess   Additional Comments Pt greeted supine in bed and left OOB in chair w/ alarm on and all needs within reach   Transfers   Sit to Stand 5  Supervision   Additional items Impulsive;Verbal cues   Stand to Sit 5  Supervision   Additional items Impulsive;Verbal cues   Toilet transfer 5  Supervision   Additional items Verbal cues;Standard toilet   Additional Comments no AD   Functional Mobility   Functional Mobility 4  Minimal assistance   Additional Comments Pt completes short household distance mobility w/ Min A, no AD   Balance   Static Sitting Fair   Dynamic Sitting Fair   Static Standing Fair   Dynamic Standing Fair -   Ambulatory Poor +   Activity Tolerance   Activity Tolerance Patient limited by fatigue   Medical Staff Made Aware co-eval w/ PT due to medical complexity and multiple comorbidities   Nurse Made Aware RN cleared   RUE Assessment   RUE Assessment WFL   LUE Assessment   LUE Assessment WFL   Hand Function "   Gross Motor Coordination Functional   Fine Motor Coordination Functional   Sensation   Light Touch No apparent deficits   Cognition   Overall Cognitive Status Impaired   Arousal/Participation Alert;Responsive;Cooperative   Attention Within functional limits   Orientation Level Oriented X4   Memory Decreased recall of precautions   Following Commands Follows one step commands without difficulty   Comments Pt is pleasant and cooperative. Higher level cognitive deficits noted. Hx of schizoaffective disorder. Mildly impulsive, requires VC for safety and pacing.   Assessment   Limitation Decreased ADL status;Decreased Safe judgement during ADL;Decreased endurance;Decreased self-care trans;Decreased high-level ADLs   Prognosis Good   Assessment Pt is a 64 y.o. female seen for OT evaluation s/p admission to [facility] on 1/18/2025 due to Ambulatory dysfunction.  Pt  has a past medical history of Anemia, Anxiety, Arthritis, Back pain at L4-L5 level, Schreiber esophagus, Bulimia, Colon polyp, Disease of thyroid gland, GERD (gastroesophageal reflux disease), Hearing loss in left ear, History of transfusion, Hyperlipidemia, Hypertension, Hypothyroidism, Leukopenia, NMS (neuroleptic malignant syndrome), Pneumonia, RA (rheumatoid arthritis) (HCC), Sciatica, Seizure (HCC), Skin spots, red, Synovial cyst of lumbar spine, Vertigo, Wears dentures, and Weight gain.  Pt with active OT evaluation/treatment and activity orders. Pt reports living alone in 1SH w/ 2STE. PTA, Pt was I w/ ADL/IADL and functional mobility, was driving and was not using DME at baseline. Pt agreeable and willing to participate in OT evaluation. Pt was greeted supine and left OOB in chair w/ alarm activated and all needs within reach. During evaluation, pt is Supervision bed mobility, transfers and UB ADLs; Min A functional mobility and LB ADLs. Pt currently presents with impairments in the following categories -steps to enter environment, limited home  support, difficulty performing ADLS, and difficulty performing IADLS  activity tolerance, endurance, standing balance/tolerance, sitting balance/tolerance, memory, and safety . These impairments, as well as pt's fatigue, impulsivity, risk for falls, and home environment  limit pt's ability to safely engage in all baseline areas of occupation, includingeating, grooming, bathing, dressing, toileting, functional mobility/transfers, community mobility, laundry , driving, house maintenance, medication management, meal prep, cleaning, social participation , and leisure activities . The patient's raw score on the -PAC Daily Activity Inpatient Short Form is 19. A raw score of greater than or equal to 19 suggests the patient may benefit from discharge to home. Please refer to the recommendation of the Occupational Therapist for safe discharge planning.    Pt would benefit from continued acute OT services throughout hospital course in order to maximize Pt's independence and overall occupational performance. Plan for OT interventions 2-3x per week. From OT standpoint,  recommend Level III (Minimum Resource Intensity) upon d/c when pt medically stable to d/c from acute care. Will continue to follow.   Goals   Patient Goals to get better   LTG Time Frame 10-14   Long Term Goal See goals below   Plan   Treatment Interventions ADL retraining;Functional transfer training;Endurance training;Cognitive reorientation;Patient/family training;Equipment evaluation/education;Compensatory technique education;Continued evaluation;Energy conservation;Activityengagement   Goal Expiration Date 02/02/25   OT Treatment Day 0   OT Frequency 2-3x/wk   Discharge Recommendation   Rehab Resource Intensity Level, OT III (Minimum Resource Intensity)   AM-PAC Daily Activity Inpatient   Lower Body Dressing 3   Bathing 3   Toileting 3   Upper Body Dressing 3   Grooming 3   Eating 4   Daily Activity Raw Score 19   Daily Activity Standardized Score (Calc  for Raw Score >=11) 40.22   AM-PAC Applied Cognition Inpatient   Following a Speech/Presentation 4   Understanding Ordinary Conversation 4   Taking Medications 4   Remembering Where Things Are Placed or Put Away 4   Remembering List of 4-5 Errands 3   Taking Care of Complicated Tasks 3   Applied Cognition Raw Score 22   Applied Cognition Standardized Score 47.83   End of Consult   Education Provided Yes   Patient Position at End of Consult Bedside chair;Bed/Chair alarm activated;All needs within reach   Nurse Communication Nurse aware of consult     OT Goals:     - Pt will be Mod I with LB ADL by the time of discharge.    - Pt will be Mod I with UB ADL by the time of discharge.    - Pt will complete toileting routine (transfers, hygiene, and clothing management) with Mod I  to maximize independence and return to prior level of function.    - Pt will complete bed mobility supine >< sit w/ Mod I to maximize independence and return home.    - Pt will transfer to bed, chair, and toilet w/ Mod I using AD / DME as needed to maximize independence and reduce burden of care.     - Pt will ambulate household distances w/ Mod I using least restrictive device to maximize independence and return home.     - Pt will increase activity tolerance (and sitting tolerance) by eating all meals OOB in the chair.     - Pt will increase standing tolerance to 20 minutes to maximize independence w/ grooming tasks standing at the sink.     - Pt will tolerate therapeutic activities for greater than 30 minutes in order to increase tolerance for functional activities.     - Pt will participate in ongoing OT assessment of cognitive skills to assist with safe d/c planning/recommendations.       Cathleen Reddy, DEVYN, OTR/L

## 2025-01-19 NOTE — ED PROVIDER NOTES
Emergency Department Sign Out Note        Sign out and transfer of care from Dr. Yuni Arnold. See Separate Emergency Department note.     The patient, Irene Plascencia, was evaluated by the previous provider for generalized weakness and vomiting.    Workup Completed:  CBC  Troponin   EKG  CXR    ED Course / Workup Pending (followup):  CMP  Lipase  CT AP                                  ED Course as of 01/19/25 0543   Sun Jan 19, 2025   0226 SO: active. 64 HTN, HLD, GIB here for generalized weakness and vomiting brown/black today. Walked to [facility] for them to call 911. Pending cardiac and GI w/u. Anticipate admission for social resources.    0432 CT abdomen pelvis with contrast  Moderate hiatal hernia containing the proximal stomach and mesenteric fat. Diffuse wall thickening of the lower esophagus and stomach noted, similar compared to the prior exam and may reflect chronic esophagitis/gastritis. No evidence for bowel   obstruction, inflammation, appendicitis, obstructive uropathy, free air, free fluid, or loculated fluid collections in the abdomen/pelvis. Ancillary findings detailed above.   0441 Pt updated on labs and CT   0443 SC message sent to [facility]    0500 Admitted to [facility]      Procedures  Medical Decision Making  Amount and/or Complexity of Data Reviewed  Labs: ordered.  Radiology: ordered. Decision-making details documented in ED Course.    Risk  Prescription drug management.  Decision regarding hospitalization.            Disposition  Final diagnoses:   Weakness   Hyponatremia   Gastritis   Hiatal hernia     Time reflects when diagnosis was documented in both MDM as applicable and the Disposition within this note       Time User Action Codes Description Comment    1/19/2025  3:21 AM Yuni Arnold Add [R53.1] Weakness     1/19/2025  4:43 AM Olivo, Thu T Add [E87.1] Hyponatremia     1/19/2025  4:44 AM Olivo, Thu T Add [K29.70] Gastritis     1/19/2025  4:44 AM Olivo, Thu T Add [K44.9]  Hiatal hernia     1/19/2025  5:18 AM Iesha Coyne [K20.90] Esophagitis     1/19/2025  5:18 AM Iesha Coyne [K22.70] Schreiber esophagus     1/19/2025  5:18 AM Iesha Coyne [K92.0] Hematemesis           ED Disposition       ED Disposition   Admit    Condition   Stable    Date/Time   Sun Jan 19, 2025  4:43 AM    Comment                  Follow-up Information    None       Patient's Medications   Discharge Prescriptions    No medications on file     No discharge procedures on file.       ED Provider  Electronically Signed by     Shanae Olivo MD  01/19/25 0543

## 2025-01-19 NOTE — ASSESSMENT & PLAN NOTE
Reports she takes Celebrex for this  Hold today in setting of possible coffee ground emesis, restart tomorrow if no further episodes

## 2025-01-19 NOTE — ASSESSMENT & PLAN NOTE
Chronic, thought to be secondary most likely to psych meds  129 today, appears relatively stable compared to prior values. Reports poor po intake yesterday.   S/p 1 L bolus in ED  Repeat BMP in AM
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
## 2025-01-20 VITALS
BODY MASS INDEX: 34.24 KG/M2 | RESPIRATION RATE: 16 BRPM | HEART RATE: 91 BPM | TEMPERATURE: 98.2 F | HEIGHT: 62 IN | OXYGEN SATURATION: 95 % | SYSTOLIC BLOOD PRESSURE: 120 MMHG | DIASTOLIC BLOOD PRESSURE: 76 MMHG | WEIGHT: 186.07 LBS

## 2025-01-20 LAB
ANION GAP SERPL CALCULATED.3IONS-SCNC: 9 MMOL/L (ref 4–13)
BUN SERPL-MCNC: 8 MG/DL (ref 5–25)
CALCIUM SERPL-MCNC: 9.8 MG/DL (ref 8.4–10.2)
CHLORIDE SERPL-SCNC: 94 MMOL/L (ref 96–108)
CO2 SERPL-SCNC: 28 MMOL/L (ref 21–32)
CREAT SERPL-MCNC: 0.65 MG/DL (ref 0.6–1.3)
ERYTHROCYTE [DISTWIDTH] IN BLOOD BY AUTOMATED COUNT: 14.6 % (ref 11.6–15.1)
GFR SERPL CREATININE-BSD FRML MDRD: 94 ML/MIN/1.73SQ M
GLUCOSE SERPL-MCNC: 91 MG/DL (ref 65–140)
HCT VFR BLD AUTO: 33.8 % (ref 34.8–46.1)
HGB BLD-MCNC: 11.2 G/DL (ref 11.5–15.4)
MCH RBC QN AUTO: 27.2 PG (ref 26.8–34.3)
MCHC RBC AUTO-ENTMCNC: 33.1 G/DL (ref 31.4–37.4)
MCV RBC AUTO: 82 FL (ref 82–98)
PLATELET # BLD AUTO: 274 THOUSANDS/UL (ref 149–390)
PMV BLD AUTO: 8.8 FL (ref 8.9–12.7)
POTASSIUM SERPL-SCNC: 3.5 MMOL/L (ref 3.5–5.3)
RBC # BLD AUTO: 4.12 MILLION/UL (ref 3.81–5.12)
SODIUM SERPL-SCNC: 131 MMOL/L (ref 135–147)
WBC # BLD AUTO: 3.36 THOUSAND/UL (ref 4.31–10.16)

## 2025-01-20 PROCEDURE — 85027 COMPLETE CBC AUTOMATED: CPT | Performed by: PHYSICIAN ASSISTANT

## 2025-01-20 PROCEDURE — 99239 HOSP IP/OBS DSCHRG MGMT >30: CPT | Performed by: PHYSICIAN ASSISTANT

## 2025-01-20 PROCEDURE — 80048 BASIC METABOLIC PNL TOTAL CA: CPT | Performed by: PHYSICIAN ASSISTANT

## 2025-01-20 RX ORDER — CELECOXIB 200 MG/1
200 CAPSULE ORAL DAILY PRN
Qty: 30 CAPSULE | Refills: 0 | Status: SHIPPED | OUTPATIENT
Start: 2025-01-20

## 2025-01-20 RX ADMIN — LORAZEPAM 2 MG: 1 TABLET ORAL at 05:55

## 2025-01-20 RX ADMIN — SUCRALFATE 1 G: 1 TABLET ORAL at 12:03

## 2025-01-20 RX ADMIN — ENOXAPARIN SODIUM 40 MG: 40 INJECTION SUBCUTANEOUS at 08:14

## 2025-01-20 RX ADMIN — METOCLOPRAMIDE 5 MG: 5 TABLET ORAL at 05:18

## 2025-01-20 RX ADMIN — AMLODIPINE BESYLATE 5 MG: 5 TABLET ORAL at 08:14

## 2025-01-20 RX ADMIN — PANTOPRAZOLE SODIUM 40 MG: 40 TABLET, DELAYED RELEASE ORAL at 05:18

## 2025-01-20 RX ADMIN — METOCLOPRAMIDE 5 MG: 5 TABLET ORAL at 12:03

## 2025-01-20 RX ADMIN — LEVOTHYROXINE SODIUM 25 MCG: 25 TABLET ORAL at 08:14

## 2025-01-20 RX ADMIN — SUCRALFATE 1 G: 1 TABLET ORAL at 08:14

## 2025-01-20 RX ADMIN — PAROXETINE HYDROCHLORIDE 60 MG: 20 TABLET, FILM COATED ORAL at 08:14

## 2025-01-20 NOTE — PLAN OF CARE
Problem: PAIN - ADULT  Goal: Verbalizes/displays adequate comfort level or baseline comfort level  Description: Interventions:  - Encourage patient to monitor pain and request assistance  - Assess pain using appropriate pain scale  - Administer analgesics based on type and severity of pain and evaluate response  - Implement non-pharmacological measures as appropriate and evaluate response  - Consider cultural and social influences on pain and pain management  - Notify physician/advanced practitioner if interventions unsuccessful or patient reports new pain  Outcome: Progressing     Problem: INFECTION - ADULT  Goal: Absence or prevention of progression during hospitalization  Description: INTERVENTIONS:  - Assess and monitor for signs and symptoms of infection  - Monitor lab/diagnostic results  - Monitor all insertion sites, i.e. indwelling lines, tubes, and drains  - Monitor endotracheal if appropriate and nasal secretions for changes in amount and color  - [place] appropriate cooling/warming therapies per order  - Administer medications as ordered  - Instruct and encourage patient and family to use good hand hygiene technique  - Identify and instruct in appropriate isolation precautions for identified infection/condition  Outcome: Progressing  Goal: Absence of fever/infection during neutropenic period  Description: INTERVENTIONS:  - Monitor WBC    Outcome: Progressing     Problem: SAFETY ADULT  Goal: Patient will remain free of falls  Description: INTERVENTIONS:  - Educate patient/family on patient safety including physical limitations  - Instruct patient to call for assistance with activity   - Consult OT/PT to assist with strengthening/mobility   - Keep Call bell within reach  - Keep bed low and locked with side rails adjusted as appropriate  - Keep care items and personal belongings within reach  - Initiate and maintain comfort rounds  - Make Fall Risk Sign visible to staff  - Apply yellow socks and bracelet  for high fall risk patients  - Consider moving patient to room near nurses station  Outcome: Progressing  Goal: Maintain or return to baseline ADL function  Description: INTERVENTIONS:  -  Assess patient's ability to carry out ADLs; assess patient's baseline for ADL function and identify physical deficits which impact ability to perform ADLs (bathing, care of mouth/teeth, toileting, grooming, dressing, etc.)  - Assess/evaluate cause of self-care deficits   - Assess range of motion  - Assess patient's mobility; develop plan if impaired  - Assess patient's need for assistive devices and provide as appropriate  - Encourage maximum independence but intervene and supervise when necessary  - Involve family in performance of ADLs  - Assess for home care needs following discharge   - Consider OT consult to assist with ADL evaluation and planning for discharge  - Provide patient education as appropriate  Outcome: Progressing  Goal: Maintains/Returns to pre admission functional level  Description: INTERVENTIONS:  - Perform AM-PAC 6 Click Basic Mobility/ Daily Activity assessment daily.  - Set and communicate daily mobility goal to care team and patient/family/caregiver.   - Collaborate with rehabilitation services on mobility goals if consulted  - Out of bed for toileting  - Record patient progress and toleration of activity level   Outcome: Progressing     Problem: DISCHARGE PLANNING  Goal: Discharge to home or other facility with appropriate resources  Description: INTERVENTIONS:  - Identify barriers to discharge w/patient and caregiver  - Arrange for needed discharge resources and transportation as appropriate  - Identify discharge learning needs (meds, wound care, etc.)  - Arrange for interpretive services to assist at discharge as needed  - Refer to Case Management Department for coordinating discharge planning if the patient needs post-hospital services based on physician/advanced practitioner order or complex needs  related to functional status, cognitive ability, or social support system  Outcome: Progressing     Problem: Knowledge Deficit  Goal: Patient/family/caregiver demonstrates understanding of disease process, treatment plan, medications, and discharge instructions  Description: Complete learning assessment and assess knowledge base.  Interventions:  - Provide teaching at level of understanding  - Provide teaching via preferred learning methods  Outcome: Progressing     Problem: NEUROSENSORY - ADULT  Goal: Achieves stable or improved neurological status  Description: INTERVENTIONS  - Monitor and report changes in neurological status  - Monitor vital signs such as temperature, blood pressure, glucose, and any other labs ordered   - Initiate measures to prevent increased intracranial pressure  - Monitor for seizure activity and implement precautions if appropriate      Outcome: Progressing  Goal: Remains free of injury related to seizures activity  Description: INTERVENTIONS  - Maintain airway, patient safety  and administer oxygen as ordered  - Monitor patient for seizure activity, document and report duration and description of seizure to physician/advanced practitioner  - If seizure occurs,  ensure patient safety during seizure  - Reorient patient post seizure  - Seizure pads on all 4 side rails  - Instruct patient/family to notify RN of any seizure activity including if an aura is experienced  - Instruct patient/family to call for assistance with activity based on nursing assessment  - Administer anti-seizure medications if ordered    Outcome: Progressing  Goal: Achieves maximal functionality and self care  Description: INTERVENTIONS  - Monitor swallowing and airway patency with patient fatigue and changes in neurological status  - Encourage and assist patient to increase activity and self care.   - Encourage visually impaired, hearing impaired and aphasic patients to use assistive/communication devices  Outcome:  Progressing     Problem: RESPIRATORY - ADULT  Goal: Achieves optimal ventilation and oxygenation  Description: INTERVENTIONS:  - Assess for changes in respiratory status  - Assess for changes in mentation and behavior  - Position to facilitate oxygenation and minimize respiratory effort  - Oxygen administered by appropriate delivery if ordered  - Initiate smoking cessation education as indicated  - Encourage broncho-pulmonary hygiene including cough, deep breathe, Incentive Spirometry  - Assess the need for suctioning and aspirate as needed  - Assess and instruct to report SOB or any respiratory difficulty  - Respiratory Therapy support as indicated  Outcome: Progressing     Problem: GASTROINTESTINAL - ADULT  Goal: Minimal or absence of nausea and/or vomiting  Description: INTERVENTIONS:  - Administer IV fluids if ordered to ensure adequate hydration  - Maintain NPO status until nausea and vomiting are resolved  - Nasogastric tube if ordered  - Administer ordered antiemetic medications as needed  - Provide nonpharmacologic comfort measures as appropriate  - Advance diet as tolerated, if ordered  - Consider nutrition services referral to assist patient with adequate nutrition and appropriate food choices  Outcome: Progressing  Goal: Maintains or returns to baseline bowel function  Description: INTERVENTIONS:  - Assess bowel function  - Encourage oral fluids to ensure adequate hydration  - Administer IV fluids if ordered to ensure adequate hydration  - Administer ordered medications as needed  - Encourage mobilization and activity  - Consider nutritional services referral to assist patient with adequate nutrition and appropriate food choices  Outcome: Progressing  Goal: Maintains adequate nutritional intake  Description: INTERVENTIONS:  - Monitor percentage of each meal consumed  - Identify factors contributing to decreased intake, treat as appropriate  - Assist with meals as needed  - Monitor I&O, weight, and lab  values if indicated  - Obtain nutrition services referral as needed  Outcome: Progressing  Goal: Oral mucous membranes remain intact  Description: INTERVENTIONS  - Assess oral mucosa and hygiene practices  - Implement preventative oral hygiene regimen  - Implement oral medicated treatments as ordered  - Initiate Nutrition services referral as needed  Outcome: Progressing     Problem: GENITOURINARY - ADULT  Goal: Maintains or returns to baseline urinary function  Description: INTERVENTIONS:  - Assess urinary function  - Encourage oral fluids to ensure adequate hydration if ordered  - Administer IV fluids as ordered to ensure adequate hydration  - Administer ordered medications as needed  - Offer frequent toileting  - Follow urinary retention protocol if ordered  Outcome: Progressing  Goal: Absence of urinary retention  Description: INTERVENTIONS:  - Assess patient’s ability to void and empty bladder  - Monitor I/O  - Bladder scan as needed  - Discuss with physician/AP medications to alleviate retention as needed  - Discuss catheterization for long term situations as appropriate  Outcome: Progressing     Problem: METABOLIC, FLUID AND ELECTROLYTES - ADULT  Goal: Electrolytes maintained within normal limits  Description: INTERVENTIONS:  - Monitor labs and assess patient for signs and symptoms of electrolyte imbalances  - Administer electrolyte replacement as ordered  - Monitor response to electrolyte replacements, including repeat lab results as appropriate  - Instruct patient on fluid and nutrition as appropriate  Outcome: Progressing  Goal: Fluid balance maintained  Description: INTERVENTIONS:  - Monitor labs   - Monitor I/O and WT  - Instruct patient on fluid and nutrition as appropriate  - Assess for signs & symptoms of volume excess or deficit  Outcome: Progressing  Goal: Glucose maintained within target range  Description: INTERVENTIONS:  - Monitor Blood Glucose as ordered  - Assess for signs and symptoms of  hyperglycemia and hypoglycemia  - Administer ordered medications to maintain glucose within target range  - Assess nutritional intake and initiate nutrition service referral as needed  Outcome: Progressing

## 2025-01-20 NOTE — ASSESSMENT & PLAN NOTE
Reports generalized weakness over the past few weeks, a/w VANG and difficulty ambulating short distances. Suspect in large part due to deconditioning  No hypoxia or tachypnea. No fevers. Troponin and ekg negative. CXR appears unremarkable, follow up official read  Echo 9/28 EF 60%, moderate tricuspid regurgitation  PT/OT consults -Home with home health care

## 2025-01-20 NOTE — ASSESSMENT & PLAN NOTE
Reports 4 episodes of brown vomitus yesterday, possibly coffee ground. No abdominal pain.   Hgb stable at 11.    RN and patient report no vomiting since arrival to ED, patient asking to eat and feels much improved  Continue carafate, PPI bid  Had EGD on 12/24 with Grade D esophagitis and medium hiatal hernia with plan to continue PPI bid and repeat EGD in 4 weeks.   Patient's symptoms resolved and is tolerating full diet
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
## 2025-01-20 NOTE — CASE MANAGEMENT
Case Management Discharge Planning Note    Patient name Irene Plascencia  Location [facility] 928/[facility] 928-01 MRN [id number]  : 1960 Date 2025       Current Admission Date: 2025  Current Admission Diagnosis:Ambulatory dysfunction   Patient Active Problem List    Diagnosis Date Noted Date Diagnosed    Problems related to living alone 2025     Lack of social support 2025     Generalized weakness 2025     Ambulatory dysfunction 2025     Leukopenia 2024     Heartburn 2024     Gastroparesis 2024     Elevated vitamin B12 level 2024     Iron deficiency 2024     Acquired absence of other toe(s), unspecified side ([facility]) 2024     Osteomyelitis, unspecified site, unspecified type ([facility]) 2024     Vitamin B12 deficiency (dietary) anemia 2023     Folate deficiency 2023     Neutropenia ([facility]) 2023     Post concussion syndrome 2023     Sepsis without acute organ dysfunction ([facility]) 2023     Acute hypoxic respiratory failure ([facility]) 2023     Headache 2023     Nutritional anemia 2023     Stress incontinence 2023     Ulcer of toe, chronic, left, with unspecified severity ([facility]) 2023     Acute encephalopathy 2022     Thrombocytosis 2022     Abdominal pain 2022     Schreiber's esophagus 2022     Microcytic anemia 2022     Self neglect 2022     Pruritus 2022     Upper GI bleed 01/10/2022     S/P tooth extraction 10/22/2021     SIRS (systemic inflammatory response syndrome) ([facility]) 2021     Hyperlipidemia 2021     Word finding difficulty 2021     Hiatal hernia with GERD without esophagitis 2021     Polyp of colon 2021     Melena 2020     Cellulitis of left upper extremity 2020     Gastroesophageal reflux disease with esophagitis, Schreiber's esophagus and gastritis 2020     Rash 2020     Iron deficiency anemia  09/21/2020     Multiple excoriations 09/21/2020     Fall 01/27/2020     Esophagitis 01/27/2020     Problem related to living arrangement 01/27/2020     Hypokalemia      NMS (neuroleptic malignant syndrome) 01/03/2020     Elevated lactic acid level 01/03/2020     Preop exam for internal medicine 11/18/2019     Severe iron deficiency anemia  11/14/2019     Chronic bilateral low back pain without sciatica 11/01/2019     Right hip pain 07/15/2019     Primary hypertension 06/12/2019     Dermal hypersensitivity reaction 11/08/2018     Psychiatric disorder 08/21/2018     Schizoaffective disorder (HCC) 08/16/2018     Serotonin syndrome 08/13/2018     Other fatigue 06/19/2018     Spinal stenosis of lumbar region with neurogenic claudication 06/15/2018     Lumbar spondylosis 06/15/2018     T12 compression fracture (HCC) 06/15/2018     Synovial cyst of lumbar facet joint 06/15/2018     Localized osteoporosis with current pathological fracture with routine healing 05/25/2018     Lumbar radiculopathy 03/19/2018     DDD (degenerative disc disease), lumbar 03/19/2018     Spondylolisthesis at L4-L5 level 03/19/2018     Anxiety 10/31/2016     Acquired hypothyroidism 10/31/2016     Multifocal pneumonia 09/25/2016     Chronic hyponatremia 09/25/2016     Constipation 04/10/2014     Bulimia nervosa in remission 03/19/2014       LOS (days): 1  Geometric Mean LOS (GMLOS) (days):   Days to GMLOS:     OBJECTIVE:  Risk of Unplanned Readmission Score: 24.91         Current admission status: Inpatient   Preferred Pharmacy:   [facility]/pharmacy #[id number] - [place], [place] - [address]  [address] [address]  Phone: [phone] Fax: [phone]    [facility] [place] - [place] - [address]  [address]  [place] [address]  Phone: [phone] Fax: [phone]    Primary Care Provider: Raheem Cain MD    Primary Insurance: [facility]  Secondary Insurance:      DISCHARGE DETAILS:    Discharge planning discussed with:: pt at bedside  [place] of Choice: Yes  Comments - Freedom of Choice: [facility] for SN/PT/OT per pt choice.  CM contacted family/caregiver?: No- see comments  Were Treatment Team discharge recommendations reviewed with patient/caregiver?: Yes  Did patient/caregiver verbalize understanding of patient care needs?: Yes  Were patient/caregiver advised of the risks associated with not following Treatment Team discharge recommendations?: Yes    Contacts  Patient Contacts: MOther- Anya   Relationship to Patient:: Family  Contact Method: Phone  Phone Number: [phone]  Reason/Outcome: Emergency Contact, Discharge Planning, Continuity of Care    Requested Home Health Care         Is the patient interested in HHC at discharge?: Yes  Home Health Discipline requested:: Nursing, Occupational Therapy, Physical Therapy  Home Health Agency Name:: [facility]  HHA External Referral Reason (only applicable if external HHA name selected): Services not provided in network or near patient location  Home Health Follow-Up Provider:: PCP  Homebound Criteria Met:: Requires the Assistance of Another Person for Safe Ambulation or to Leave the Home  Supporting Clincal Findings:: Fatigues Easliy in Short Distances, Limited Endurance    DME Referral Provided  Referral made for DME?: No    Other Referral/Resources/Interventions Provided:  Interventions: HHC         Treatment Team Recommendation: Home with Home Health Care  Discharge Destination Plan:: Home with Home Health Care  Transport at Discharge : Ride Share        Transported by (Company and Unit #): [employer]  ETA of Transport (Date): 01/20/25                          Additional Comments: [facility] reserved for Sn/PT/OT per pt choice

## 2025-01-20 NOTE — UTILIZATION REVIEW
"NOTIFICATION OF INPATIENT ADMISSION   AUTHORIZATION REQUEST   SERVICING FACILITY:   [facility]  Address: [address] [place] [address]  Tax ID: [id number]  NPI: [id number] ATTENDING PROVIDER:  Attending Name and NPI#: Kary Perez Md [[id number]]  Address: [address] [place] [address]  Phone: [phone]   ADMISSION INFORMATION:  Place of Service: Inpatient [facility]  Place of Service Code: 21  Inpatient Admission Date/Time: 1/19/25  5:00 AM  Discharge Date/Time: No discharge date for patient encounter.  Admitting Diagnosis Code/Description:  Hematemesis [K92.0]  Hiatal hernia [K44.9]  Gastritis [K29.70]  Hyponatremia [E87.1]  Vomiting [R11.10]  Weakness [R53.1]  Esophagitis [K20.90]  Schreiber esophagus [K22.70]     UTILIZATION REVIEW CONTACT:  Doris Phelps"Janae\" Domingo Utilization   Network Utilization Review Department  Phone: [phone]  Fax: [phone]  Email: [email or web address]  Contact for approvals/pending authorizations, clinical reviews, and discharge.     PHYSICIAN ADVISORY SERVICES:  Medical Necessity Denial & Sjkd-fi-Wmpf Review  Phone: [phone]  Fax: [phone]  Email: [email or web address]     DISCHARGE SUPPORT TEAM:  For Patients Discharge Needs & Updates  Phone: [phone] opt. 2 Fax: [phone]  Email: [email or web address]     "

## 2025-01-20 NOTE — ASSESSMENT & PLAN NOTE
Reports difficulty walking due to deconditioning and b/l leg pain  Treated with course of Keflex for LE cellulitis after ED presentation last week. LEs with no current evidence of infection/deformity/swelling with normal strength and range of motion  PT/OT evaluations - level III - [facility] arranged for PT/OT

## 2025-01-20 NOTE — ED ATTENDING ATTESTATION
1/18/2025  I, Clarence Olivas DO, saw and evaluated the patient. I have discussed the patient with the resident/non-physician practitioner and agree with the resident's/non-physician practitioner's findings, Plan of Care, and MDM as documented in the resident's/non-physician practitioner's note, except where noted. All available labs and Radiology studies were reviewed.  I was present for key portions of any procedure(s) performed by the resident/non-physician practitioner and I was immediately available to provide assistance.       At this point I agree with the current assessment done in the Emergency Department.  I have conducted an independent evaluation of this patient a history and physical is as follows:    64-year-old female history of Schreiber's esophagus presents for vomiting concern for GI bleed given dark vomit.  Also complains of weakness difficulty getting around history of anemia not on any blood thinners.  On exam normal vitals tender in the abdomen without any peritonitis plan labs CT abdomen pelvis check for anemia will give PPI likely admission    ED Course         Critical Care Time  Procedures

## 2025-01-21 ENCOUNTER — TRANSITIONAL CARE MANAGEMENT (OUTPATIENT)
Dept: INTERNAL MEDICINE CLINIC | Facility: CLINIC | Age: 65
End: 2025-01-21

## 2025-01-21 PROCEDURE — TCMXX

## 2025-01-21 NOTE — PROGRESS NOTES
Father Karime gave a blessing and prayer.   01/21/25 0800   Clinical Encounter Type   Visited With Patient   [facility] Encounters   [facility] Needs Prayer

## 2025-02-17 DIAGNOSIS — F99 PSYCHIATRIC DISORDER: ICD-10-CM

## 2025-02-17 DIAGNOSIS — F25.1 SCHIZOAFFECTIVE DISORDER, DEPRESSIVE TYPE (HCC): ICD-10-CM

## 2025-02-17 DIAGNOSIS — K31.84 GASTROPARESIS: ICD-10-CM

## 2025-02-17 DIAGNOSIS — F41.9 ANXIETY: ICD-10-CM

## 2025-02-17 RX ORDER — METOCLOPRAMIDE 5 MG/1
5 TABLET ORAL
Qty: 90 TABLET | Refills: 1 | Status: SHIPPED | OUTPATIENT
Start: 2025-02-17

## 2025-02-17 RX ORDER — QUETIAPINE 400 MG/1
400 TABLET, FILM COATED, EXTENDED RELEASE ORAL
Qty: 90 TABLET | Refills: 3 | Status: SHIPPED | OUTPATIENT
Start: 2025-02-17

## 2025-02-17 RX ORDER — PAROXETINE 30 MG/1
60 TABLET, FILM COATED ORAL DAILY
Qty: 180 TABLET | Refills: 1 | Status: SHIPPED | OUTPATIENT
Start: 2025-02-17

## 2025-02-17 NOTE — TELEPHONE ENCOUNTER
PT is out of the meds requested for refills. Please complete refills ASAP per PT's request.    Please advise    Thankyou

## 2025-02-17 NOTE — TELEPHONE ENCOUNTER
PT requesting the Diodon to please be re-instated. It was taken off the list the last time she was in the hospital.    Please advise    ThankYou

## 2025-02-25 ENCOUNTER — TELEPHONE (OUTPATIENT)
Dept: GASTROENTEROLOGY | Facility: HOSPITAL | Age: 65
End: 2025-02-25

## 2025-02-28 ENCOUNTER — HOSPITAL ENCOUNTER (OUTPATIENT)
Dept: GASTROENTEROLOGY | Facility: HOSPITAL | Age: 65
Setting detail: OUTPATIENT SURGERY
End: 2025-02-28
Attending: INTERNAL MEDICINE
Payer: COMMERCIAL

## 2025-02-28 ENCOUNTER — HOSPITAL ENCOUNTER (OUTPATIENT)
Dept: GASTROENTEROLOGY | Facility: HOSPITAL | Age: 65
End: 2025-02-28
Payer: COMMERCIAL

## 2025-02-28 ENCOUNTER — ANESTHESIA EVENT (OUTPATIENT)
Dept: GASTROENTEROLOGY | Facility: HOSPITAL | Age: 65
End: 2025-02-28
Payer: COMMERCIAL

## 2025-02-28 ENCOUNTER — ANESTHESIA (OUTPATIENT)
Dept: GASTROENTEROLOGY | Facility: HOSPITAL | Age: 65
End: 2025-02-28
Payer: COMMERCIAL

## 2025-02-28 VITALS
SYSTOLIC BLOOD PRESSURE: 160 MMHG | TEMPERATURE: 98.5 F | DIASTOLIC BLOOD PRESSURE: 83 MMHG | OXYGEN SATURATION: 96 % | HEART RATE: 98 BPM | RESPIRATION RATE: 16 BRPM

## 2025-02-28 DIAGNOSIS — Z98.890 HISTORY OF REPAIR OF HIATAL HERNIA: ICD-10-CM

## 2025-02-28 DIAGNOSIS — K21.9 HIATAL HERNIA WITH GERD WITHOUT ESOPHAGITIS: ICD-10-CM

## 2025-02-28 DIAGNOSIS — Z87.19 HISTORY OF REPAIR OF HIATAL HERNIA: ICD-10-CM

## 2025-02-28 DIAGNOSIS — R11.2 NAUSEA AND VOMITING, UNSPECIFIED VOMITING TYPE: ICD-10-CM

## 2025-02-28 DIAGNOSIS — K44.9 HIATAL HERNIA WITH GERD WITHOUT ESOPHAGITIS: ICD-10-CM

## 2025-02-28 PROBLEM — J96.01 ACUTE HYPOXIC RESPIRATORY FAILURE (HCC): Status: RESOLVED | Noted: 2023-07-23 | Resolved: 2025-02-28

## 2025-02-28 PROBLEM — K08.409 S/P TOOTH EXTRACTION: Status: RESOLVED | Noted: 2021-10-22 | Resolved: 2025-02-28

## 2025-02-28 PROBLEM — G93.40 ACUTE ENCEPHALOPATHY: Status: RESOLVED | Noted: 2022-12-17 | Resolved: 2025-02-28

## 2025-02-28 PROBLEM — K92.2 UPPER GI BLEED: Status: RESOLVED | Noted: 2022-01-10 | Resolved: 2025-02-28

## 2025-02-28 PROBLEM — R65.10 SIRS (SYSTEMIC INFLAMMATORY RESPONSE SYNDROME) (HCC): Status: RESOLVED | Noted: 2021-05-20 | Resolved: 2025-02-28

## 2025-02-28 PROCEDURE — 91010 ESOPHAGUS MOTILITY STUDY: CPT

## 2025-02-28 PROCEDURE — 91010 ESOPHAGUS MOTILITY STUDY: CPT | Performed by: INTERNAL MEDICINE

## 2025-02-28 RX ORDER — LIDOCAINE HCL/PF 100 MG/5ML
SYRINGE (ML) INJECTION AS NEEDED
Status: DISCONTINUED | OUTPATIENT
Start: 2025-02-28 | End: 2025-02-28

## 2025-02-28 RX ORDER — SODIUM CHLORIDE 9 MG/ML
INJECTION, SOLUTION INTRAVENOUS CONTINUOUS PRN
Status: DISCONTINUED | OUTPATIENT
Start: 2025-02-28 | End: 2025-02-28

## 2025-02-28 RX ORDER — PROPOFOL 10 MG/ML
INJECTION, EMULSION INTRAVENOUS CONTINUOUS PRN
Status: DISCONTINUED | OUTPATIENT
Start: 2025-02-28 | End: 2025-02-28

## 2025-02-28 RX ORDER — PROPOFOL 10 MG/ML
INJECTION, EMULSION INTRAVENOUS AS NEEDED
Status: DISCONTINUED | OUTPATIENT
Start: 2025-02-28 | End: 2025-02-28

## 2025-02-28 RX ADMIN — PROPOFOL 20 MG: 10 INJECTION, EMULSION INTRAVENOUS at 08:15

## 2025-02-28 RX ADMIN — PROPOFOL 60 MG: 10 INJECTION, EMULSION INTRAVENOUS at 08:08

## 2025-02-28 RX ADMIN — PROPOFOL 100 MCG/KG/MIN: 10 INJECTION, EMULSION INTRAVENOUS at 08:08

## 2025-02-28 RX ADMIN — LIDOCAINE HYDROCHLORIDE 80 MG: 20 INJECTION INTRAVENOUS at 08:08

## 2025-02-28 RX ADMIN — SODIUM CHLORIDE: 9 INJECTION, SOLUTION INTRAVENOUS at 08:07

## 2025-02-28 RX ADMIN — PROPOFOL 20 MG: 10 INJECTION, EMULSION INTRAVENOUS at 08:09

## 2025-02-28 RX ADMIN — PROPOFOL 20 MG: 10 INJECTION, EMULSION INTRAVENOUS at 08:12

## 2025-02-28 NOTE — ANESTHESIA PREPROCEDURE EVALUATION
Procedure:  EGD    Relevant Problems   ANESTHESIA   (-) History of anesthesia complications      CARDIO   (+) Hyperlipidemia   (+) Primary hypertension   (-) Chest pain   (-) VANG (dyspnea on exertion)      ENDO   (+) Acquired hypothyroidism      GI/HEPATIC   (+) Hiatal hernia with GERD without esophagitis      HEMATOLOGY   (+) Iron deficiency anemia   (+) Microcytic anemia   (+) Nutritional anemia   (+) Severe iron deficiency anemia    (+) Vitamin B12 deficiency (dietary) anemia      MUSCULOSKELETAL   (+) Chronic bilateral low back pain without sciatica   (+) DDD (degenerative disc disease), lumbar   (+) Hiatal hernia with GERD without esophagitis      NEURO/PSYCH   (+) Anxiety   (+) Chronic bilateral low back pain without sciatica   (+) Headache   (+) Word finding difficulty      PULMONARY   (-) Shortness of breath   (-) Sleep apnea   (-) URI (upper respiratory infection)      Behavioral Health   (+) Schizoaffective disorder (HCC)      Other   (+) Osteomyelitis, unspecified site, unspecified type (HCC)        Left Ventricle: Left ventricular cavity size is normal. Wall thickness is normal. The left ventricular ejection fraction is 60%. Systolic function is normal. Wall motion is normal. Diastolic function is normal.    Right Ventricle: Right ventricular cavity size is mildly dilated. Systolic function is mildly reduced.    Left Atrium: The atrium is mildly dilated.    Mitral Valve: There is mild annular calcification. There is mild regurgitation.    Tricuspid Valve: There is moderate regurgitation.  Physical Exam    Airway    Mallampati score: II  TM Distance: >3 FB  Neck ROM: full     Dental    lower dentures and upper dentures    Cardiovascular      Pulmonary      Other Findings  post-pubertal.      Anesthesia Plan  ASA Score- 3     Anesthesia Type- IV sedation with anesthesia with ASA Monitors.         Additional Monitors:     Airway Plan:            Plan Factors-Exercise tolerance (METS): >4 METS.    Chart  reviewed. EKG reviewed.  Existing labs reviewed. Patient summary reviewed.                  Induction- intravenous.    Postoperative Plan-         Informed Consent- Anesthetic plan and risks discussed with patient.  I personally reviewed this patient with the CRNA. Discussed and agreed on the Anesthesia Plan with the CRNA..      NPO Status:  Vitals Value Taken Time   Date of last liquid 02/28/25 02/28/25 0658   Time of last liquid 0500 02/28/25 0658   Date of last solid 02/27/25 02/28/25 0658   Time of last solid 1600 02/28/25 0658

## 2025-02-28 NOTE — PERIOPERATIVE NURSING NOTE
Patient brought in the room and educated on procedure. Patient for EGD  guided manometry procedure. Please see the EGD procedure note.   A transnasal insertion of the High Resolution esophageal manometry catheter was inserted via left nostril.  Bands of both Upper esophageal sphincter  (UES) and Lower esophageal sphincter ( LES) were observed on the color contour. Catheter was secured to left cheek. A 30 second baseline resting pressure was obtained to identify the UES and LES . Once patient recovered. The catheter had to be repositioned. There was image of the catheter coiled and patient able to sit at the edge of the bed and followed prompts to swallow to repositioned the catheter. Patient instructed to swallow a series of 10 liquid swallows using 5 cc room temperature normal saline to assess esophageal motility and bolus transit. Patient administered 10 viscous swallows using 5 cc viscous solution, 1 multiple rapid drink swallow using 2 cc room temperature normal saline given a total of 5 drinks. Patient instructed to sit up at the edge of the stretcher and given 5 upright liquid swallows using 5 cc room temperature normal saline and 1 rapid drink challenge using 200 cc room temperature water. At the end of the procedure the high resolution esophageal manometry catheter was removed from the nostril intact. No bleeding.Patient transported to the recovery area via stretcher and report given to recovery nurse Sunni. Discharge instructions to be given to patient when patient meets the criteria to be discharge. Please see the EGD visit for time of departure from the unit.

## 2025-02-28 NOTE — ANESTHESIA POSTPROCEDURE EVALUATION
Post-Op Assessment Note    CV Status:  Stable  Pain Score: 0    Pain management: adequate       Mental Status:  Alert and awake   Hydration Status:  Euvolemic   PONV Controlled:  Controlled   Airway Patency:  Patent     Post Op Vitals Reviewed: Yes    No anethesia notable event occurred.    Staff: Anesthesiologist, CRNA           Last Filed PACU Vitals:  Vitals Value Taken Time   Temp     Pulse 94    /73    Resp 16    SpO2 94

## 2025-02-28 NOTE — H&P
History and Physical -  [facility]  Irene Plascencia 64 y.o. female MRN: [id number]                  HPI: Irene Plascencia is a 64 y.o. year old female who presents for EGD manometry placement      REVIEW OF SYSTEMS: Per the HPI, and otherwise unremarkable.    Historical Information   Past Medical History:   Diagnosis Date    Anemia     Anxiety     Arthritis     Back pain at L4-L5 level     Schreiber esophagus     Bulimia     Colon polyp     Disease of thyroid gland     hypothyroid    GERD (gastroesophageal reflux disease)     Hearing loss in left ear     History of transfusion     Hyperlipidemia     Hypertension     Hypothyroidism     Leukopenia     NMS (neuroleptic malignant syndrome) 01/03/2020    Pneumonia     RA (rheumatoid arthritis) (HCC)     in feet    Sciatica     Seizure (HCC)     due to medication mix up 8/2018 only one historically    Skin spots, red     lower right arm - poss from cat- no s/s infection    Synovial cyst of lumbar spine     Vertigo     Wears dentures     full set    Weight gain      Past Surgical History:   Procedure Laterality Date    BONE MARROW BIOPSY      BREAST SURGERY      enlargement with implants    CLOSED REDUCTION DISTAL FEMUR FRACTURE      COLONOSCOPY      COSMETIC SURGERY      DENTAL SURGERY      HIP ARTHROPLASTY Right 01/02/2020    Procedure: REVISION TOTAL HIP ARTHROPLASTY WITH REPAIR OF PARIPROSTHETIC FRACTURE - POSTERIOR APPROACH;  Surgeon: Dilan Pedersen MD;  Location: [facility];  Service: Orthopedics    WY AMPUTATION METATARSAL W/TOE SINGLE Left 04/07/2023    Procedure: AMPUTATION TOE 4th;  Surgeon: Conner Bartlett DPM;  Location:  MAIN OR;  Service: Podiatry    WY ARTHRP ACETBLR/PROX FEM PROSTC AGRFT/ALGRFT Right 12/11/2019    Procedure: ARTHROPLASTY HIP TOTAL ANTERIOR;  Surgeon: Dilan Pedersen MD;  Location:  MAIN OR;  Service: Orthopedics    WY ESOPHAGOGASTRODUODENOSCOPY TRANSORAL DIAGNOSTIC N/A 05/11/2021    Procedure:  ESOPHAGOGASTRODUODENOSCOPY (EGD);  Surgeon: David Cedeño MD;  Location: [facility];  Service: General    CO LAPS RPR PARAESPHGL HRNA INCL FUNDPLSTY W/MESH N/A 05/11/2021    Procedure: REPAIR HERNIA PARAESOPHAGEAL  LAPAROSCOPIC;  Surgeon: David Cedeño MD;  Location: [facility];  Service: General    CO UNLISTED PROCEDURE ESOPHAGUS N/A 05/11/2021    Procedure: FUNDOPLICATION TRANSORAL INCISIONLESS;  Surgeon: Brad Cadet MD;  Location: [facility];  Service: Gastroenterology    RHINOPLASTY      SINUS SURGERY      TOE AMPUTATION Left     2023 4th toe    TRANSORAL INCISIONLESS FUNDOPLICATION (TIF)  05/11/2021     Social History   Social History     Substance and Sexual Activity   Alcohol Use Not Currently     Social History     Substance and Sexual Activity   Drug Use Not Currently     Social History     Tobacco Use   Smoking Status Never    Passive exposure: Past   Smokeless Tobacco Never     Family History   Problem Relation Age of Onset    Uterine cancer Mother     Esophageal cancer Father     Colon cancer Maternal Grandmother        Meds/Allergies       Current Outpatient Medications:     amLODIPine (NORVASC) 5 mg tablet    celecoxib (CeleBREX) 200 mg capsule    levothyroxine 25 mcg tablet    LORazepam (ATIVAN) 2 mg tablet    metoclopramide (REGLAN) 5 mg tablet    omeprazole (PriLOSEC) 40 MG capsule    PARoxetine (PAXIL) 30 mg tablet    QUEtiapine (SEROquel XR) 400 mg 24 hr tablet    sucralfate (CARAFATE) 1 g tablet    loratadine (CLARITIN) 10 mg tablet    ondansetron (ZOFRAN) 4 mg tablet    Allergies   Allergen Reactions    Latex Anaphylaxis, Rash and Tongue Swelling     Band aids, adhesives wears normal underwear, can eat bananas  USE PAPER TAPE    Risperdal [Risperidone] Shortness Of Breath     Rapid heart beat, SOB    Zyprexa [Olanzapine] Shortness Of Breath     Rapid heartbeat       Objective     /84   Pulse 101   Temp 98.8 °F (37.1 °C) (Tympanic)   Resp 20   SpO2 94%       PHYSICAL EXAM    Gen:  NAD  Head: NCAT  CV: RRR  CHEST: Clear  ABD: soft, NT/ND  EXT: no edema      ASSESSMENT/PLAN:  This is a 64 y.o. year old female here for EGD manometery, and she is stable and optimized for her procedure.

## 2025-03-04 ENCOUNTER — RESULTS FOLLOW-UP (OUTPATIENT)
Dept: GASTROENTEROLOGY | Facility: CLINIC | Age: 65
End: 2025-03-04

## 2025-03-04 DIAGNOSIS — E03.9 ACQUIRED HYPOTHYROIDISM: ICD-10-CM

## 2025-03-04 DIAGNOSIS — R51.9 NONINTRACTABLE HEADACHE, UNSPECIFIED CHRONICITY PATTERN, UNSPECIFIED HEADACHE TYPE: ICD-10-CM

## 2025-03-04 DIAGNOSIS — I10 HYPERTENSION: ICD-10-CM

## 2025-03-05 RX ORDER — LEVOTHYROXINE SODIUM 25 UG/1
25 TABLET ORAL DAILY
Qty: 90 TABLET | Refills: 1 | Status: SHIPPED | OUTPATIENT
Start: 2025-03-05

## 2025-03-05 RX ORDER — AMLODIPINE BESYLATE 5 MG/1
5 TABLET ORAL DAILY
Qty: 90 TABLET | Refills: 1 | Status: SHIPPED | OUTPATIENT
Start: 2025-03-05

## 2025-03-05 RX ORDER — CELECOXIB 200 MG/1
200 CAPSULE ORAL DAILY PRN
Qty: 30 CAPSULE | Refills: 3 | Status: SHIPPED | OUTPATIENT
Start: 2025-03-05

## 2025-03-11 DIAGNOSIS — K22.10 EROSIVE ESOPHAGITIS: ICD-10-CM

## 2025-03-11 RX ORDER — PANTOPRAZOLE SODIUM 40 MG/1
40 TABLET, DELAYED RELEASE ORAL 2 TIMES DAILY
Qty: 180 TABLET | Refills: 1 | OUTPATIENT
Start: 2025-03-11

## 2025-03-20 ENCOUNTER — APPOINTMENT (EMERGENCY)
Dept: RADIOLOGY | Facility: HOSPITAL | Age: 65
DRG: 369 | End: 2025-03-20
Payer: COMMERCIAL

## 2025-03-20 ENCOUNTER — HOSPITAL ENCOUNTER (INPATIENT)
Facility: HOSPITAL | Age: 65
LOS: 3 days | Discharge: HOME | DRG: 369 | End: 2025-03-23
Attending: EMERGENCY MEDICINE | Admitting: INTERNAL MEDICINE
Payer: COMMERCIAL

## 2025-03-20 DIAGNOSIS — R42 DIZZINESS: ICD-10-CM

## 2025-03-20 DIAGNOSIS — K21.9 HIATAL HERNIA WITH GERD WITHOUT ESOPHAGITIS: ICD-10-CM

## 2025-03-20 DIAGNOSIS — K92.0 HEMATEMESIS: Primary | ICD-10-CM

## 2025-03-20 DIAGNOSIS — K21.00 GASTROESOPHAGEAL REFLUX DISEASE WITH ESOPHAGITIS WITHOUT HEMORRHAGE: ICD-10-CM

## 2025-03-20 DIAGNOSIS — K44.9 HIATAL HERNIA WITH GERD WITHOUT ESOPHAGITIS: ICD-10-CM

## 2025-03-20 DIAGNOSIS — E87.6 HYPOKALEMIA: ICD-10-CM

## 2025-03-20 DIAGNOSIS — K20.90 ESOPHAGITIS: ICD-10-CM

## 2025-03-20 DIAGNOSIS — K92.1 MELENA: ICD-10-CM

## 2025-03-20 LAB
ABO GROUP BLD: NORMAL
ALBUMIN SERPL BCG-MCNC: 4 G/DL (ref 3.5–5)
ALP SERPL-CCNC: 66 U/L (ref 34–104)
ALT SERPL W P-5'-P-CCNC: 14 U/L (ref 7–52)
ANION GAP SERPL CALCULATED.3IONS-SCNC: 15 MMOL/L (ref 4–13)
AST SERPL W P-5'-P-CCNC: 14 U/L (ref 13–39)
ATRIAL RATE: 102 BPM
BASOPHILS # BLD AUTO: 0.04 THOUSANDS/ÂΜL (ref 0–0.1)
BASOPHILS NFR BLD AUTO: 1 % (ref 0–1)
BILIRUB SERPL-MCNC: 0.29 MG/DL (ref 0.2–1)
BLD GP AB SCN SERPL QL: NEGATIVE
BUN SERPL-MCNC: 7 MG/DL (ref 5–25)
CALCIUM SERPL-MCNC: 10.1 MG/DL (ref 8.4–10.2)
CHLORIDE SERPL-SCNC: 92 MMOL/L (ref 96–108)
CO2 SERPL-SCNC: 25 MMOL/L (ref 21–32)
CREAT SERPL-MCNC: 0.62 MG/DL (ref 0.6–1.3)
EOSINOPHIL # BLD AUTO: 0.12 THOUSAND/ÂΜL (ref 0–0.61)
EOSINOPHIL NFR BLD AUTO: 2 % (ref 0–6)
ERYTHROCYTE [DISTWIDTH] IN BLOOD BY AUTOMATED COUNT: 14.8 % (ref 11.6–15.1)
GFR SERPL CREATININE-BSD FRML MDRD: 95 ML/MIN/1.73SQ M
GLUCOSE SERPL-MCNC: 115 MG/DL (ref 65–140)
HCT VFR BLD AUTO: 34.1 % (ref 34.8–46.1)
HGB BLD-MCNC: 10.9 G/DL (ref 11.5–15.4)
IMM GRANULOCYTES # BLD AUTO: 0.02 THOUSAND/UL (ref 0–0.2)
IMM GRANULOCYTES NFR BLD AUTO: 0 % (ref 0–2)
LIPASE SERPL-CCNC: 109 U/L (ref 11–82)
LYMPHOCYTES # BLD AUTO: 0.92 THOUSANDS/ÂΜL (ref 0.6–4.47)
LYMPHOCYTES NFR BLD AUTO: 15 % (ref 14–44)
MCH RBC QN AUTO: 24.9 PG (ref 26.8–34.3)
MCHC RBC AUTO-ENTMCNC: 32 G/DL (ref 31.4–37.4)
MCV RBC AUTO: 78 FL (ref 82–98)
MONOCYTES # BLD AUTO: 0.57 THOUSAND/ÂΜL (ref 0.17–1.22)
MONOCYTES NFR BLD AUTO: 9 % (ref 4–12)
NEUTROPHILS # BLD AUTO: 4.5 THOUSANDS/ÂΜL (ref 1.85–7.62)
NEUTS SEG NFR BLD AUTO: 73 % (ref 43–75)
NRBC BLD AUTO-RTO: 0 /100 WBCS
P AXIS: 32 DEGREES
PLATELET # BLD AUTO: 338 THOUSANDS/UL (ref 149–390)
PMV BLD AUTO: 9.3 FL (ref 8.9–12.7)
POTASSIUM SERPL-SCNC: 2.5 MMOL/L (ref 3.5–5.3)
PR INTERVAL: 122 MS
PROT SERPL-MCNC: 6.8 G/DL (ref 6.4–8.4)
QRS AXIS: -12 DEGREES
QRSD INTERVAL: 86 MS
QT INTERVAL: 382 MS
QTC INTERVAL: 498 MS
RBC # BLD AUTO: 4.37 MILLION/UL (ref 3.81–5.12)
RH BLD: POSITIVE
SODIUM SERPL-SCNC: 132 MMOL/L (ref 135–147)
SPECIMEN EXPIRATION DATE: NORMAL
T WAVE AXIS: 51 DEGREES
VENTRICULAR RATE: 102 BPM
WBC # BLD AUTO: 6.17 THOUSAND/UL (ref 4.31–10.16)

## 2025-03-20 PROCEDURE — 93005 ELECTROCARDIOGRAM TRACING: CPT

## 2025-03-20 PROCEDURE — 96375 TX/PRO/DX INJ NEW DRUG ADDON: CPT

## 2025-03-20 PROCEDURE — 99285 EMERGENCY DEPT VISIT HI MDM: CPT

## 2025-03-20 PROCEDURE — 99222 1ST HOSP IP/OBS MODERATE 55: CPT | Performed by: INTERNAL MEDICINE

## 2025-03-20 PROCEDURE — 80053 COMPREHEN METABOLIC PANEL: CPT

## 2025-03-20 PROCEDURE — 96366 THER/PROPH/DIAG IV INF ADDON: CPT

## 2025-03-20 PROCEDURE — 83690 ASSAY OF LIPASE: CPT

## 2025-03-20 PROCEDURE — 74177 CT ABD & PELVIS W/CONTRAST: CPT

## 2025-03-20 PROCEDURE — 96365 THER/PROPH/DIAG IV INF INIT: CPT

## 2025-03-20 PROCEDURE — 99285 EMERGENCY DEPT VISIT HI MDM: CPT | Performed by: EMERGENCY MEDICINE

## 2025-03-20 PROCEDURE — 86901 BLOOD TYPING SEROLOGIC RH(D): CPT

## 2025-03-20 PROCEDURE — 36415 COLL VENOUS BLD VENIPUNCTURE: CPT

## 2025-03-20 PROCEDURE — 86900 BLOOD TYPING SEROLOGIC ABO: CPT

## 2025-03-20 PROCEDURE — 85025 COMPLETE CBC W/AUTO DIFF WBC: CPT

## 2025-03-20 PROCEDURE — 93010 ELECTROCARDIOGRAM REPORT: CPT | Performed by: INTERNAL MEDICINE

## 2025-03-20 PROCEDURE — 86850 RBC ANTIBODY SCREEN: CPT

## 2025-03-20 RX ORDER — SODIUM CHLORIDE AND POTASSIUM CHLORIDE 150; 900 MG/100ML; MG/100ML
100 INJECTION, SOLUTION INTRAVENOUS CONTINUOUS
Status: DISCONTINUED | OUTPATIENT
Start: 2025-03-20 | End: 2025-03-21

## 2025-03-20 RX ORDER — PANTOPRAZOLE SODIUM 40 MG/10ML
40 INJECTION, POWDER, LYOPHILIZED, FOR SOLUTION INTRAVENOUS ONCE
Status: COMPLETED | OUTPATIENT
Start: 2025-03-20 | End: 2025-03-20

## 2025-03-20 RX ORDER — POTASSIUM CHLORIDE 14.9 MG/ML
20 INJECTION INTRAVENOUS
Status: COMPLETED | OUTPATIENT
Start: 2025-03-20 | End: 2025-03-21

## 2025-03-20 RX ORDER — METOCLOPRAMIDE HYDROCHLORIDE 5 MG/ML
10 INJECTION INTRAMUSCULAR; INTRAVENOUS ONCE
Status: COMPLETED | OUTPATIENT
Start: 2025-03-20 | End: 2025-03-20

## 2025-03-20 RX ORDER — ONDANSETRON 2 MG/ML
4 INJECTION INTRAMUSCULAR; INTRAVENOUS EVERY 6 HOURS PRN
Status: DISCONTINUED | OUTPATIENT
Start: 2025-03-20 | End: 2025-03-23 | Stop reason: HOSPADM

## 2025-03-20 RX ORDER — MAGNESIUM SULFATE 1 G/100ML
1 INJECTION INTRAVENOUS ONCE
Status: DISCONTINUED | OUTPATIENT
Start: 2025-03-20 | End: 2025-03-21

## 2025-03-20 RX ORDER — ACETAMINOPHEN 325 MG/1
650 TABLET ORAL EVERY 6 HOURS PRN
Status: DISCONTINUED | OUTPATIENT
Start: 2025-03-20 | End: 2025-03-23 | Stop reason: HOSPADM

## 2025-03-20 RX ORDER — ACETAMINOPHEN 325 MG/1
650 TABLET ORAL ONCE
Status: COMPLETED | OUTPATIENT
Start: 2025-03-20 | End: 2025-03-20

## 2025-03-20 RX ORDER — SODIUM CHLORIDE 9 MG/ML
100 INJECTION, SOLUTION INTRAVENOUS CONTINUOUS
Status: DISCONTINUED | OUTPATIENT
Start: 2025-03-20 | End: 2025-03-20

## 2025-03-20 RX ORDER — PANTOPRAZOLE SODIUM 40 MG/10ML
40 INJECTION, POWDER, LYOPHILIZED, FOR SOLUTION INTRAVENOUS EVERY 12 HOURS SCHEDULED
Status: DISCONTINUED | OUTPATIENT
Start: 2025-03-21 | End: 2025-03-21

## 2025-03-20 RX ORDER — POTASSIUM CHLORIDE 20MEQ/15ML
40 LIQUID (ML) ORAL ONCE
Status: COMPLETED | OUTPATIENT
Start: 2025-03-20 | End: 2025-03-20

## 2025-03-20 RX ORDER — POTASSIUM CHLORIDE 1500 MG/1
40 TABLET, EXTENDED RELEASE ORAL ONCE
Status: COMPLETED | OUTPATIENT
Start: 2025-03-20 | End: 2025-03-21

## 2025-03-20 RX ORDER — SUCRALFATE 1 G/1
1 TABLET ORAL ONCE
Status: COMPLETED | OUTPATIENT
Start: 2025-03-20 | End: 2025-03-20

## 2025-03-20 RX ADMIN — POTASSIUM CHLORIDE 20 MEQ: 14.9 INJECTION, SOLUTION INTRAVENOUS at 19:53

## 2025-03-20 RX ADMIN — METOCLOPRAMIDE 10 MG: 5 INJECTION, SOLUTION INTRAMUSCULAR; INTRAVENOUS at 19:52

## 2025-03-20 RX ADMIN — PANTOPRAZOLE SODIUM 40 MG: 40 INJECTION, POWDER, FOR SOLUTION INTRAVENOUS at 19:53

## 2025-03-20 RX ADMIN — IOHEXOL 85 ML: 350 INJECTION, SOLUTION INTRAVENOUS at 20:13

## 2025-03-20 RX ADMIN — SODIUM CHLORIDE 1000 ML: 0.9 INJECTION, SOLUTION INTRAVENOUS at 20:06

## 2025-03-20 RX ADMIN — POTASSIUM CHLORIDE 20 MEQ: 14.9 INJECTION, SOLUTION INTRAVENOUS at 22:50

## 2025-03-20 RX ADMIN — SUCRALFATE 1 G: 1 TABLET ORAL at 19:52

## 2025-03-20 RX ADMIN — POTASSIUM CHLORIDE 40 MEQ: 20 SOLUTION ORAL at 19:52

## 2025-03-20 RX ADMIN — ACETAMINOPHEN 650 MG: 325 TABLET, FILM COATED ORAL at 19:52

## 2025-03-20 NOTE — ED PROVIDER NOTES
Time reflects when diagnosis was documented in both MDM as applicable and the Disposition within this note       Time User Action Codes Description Comment    3/20/2025  8:48 PM Winston Lr [K92.0] Hematemesis     3/20/2025  8:48 PM Winston Lr [K92.1] Melena     3/20/2025  8:48 PM Winston Lr [E87.6] Hypokalemia     3/20/2025  8:48 PM Winston Lr [K21.00] Gastroesophageal reflux disease with esophagitis without hemorrhage           ED Disposition       ED Disposition   Admit    Condition   Stable    Date/Time   u Mar 20, 2025  9:45 PM    Comment   Case was discussed with Dr. Sharif and the patient's admission status was agreed to be Admission Status: inpatient status to the service of Dr. Sharif.               Assessment & Plan       Medical Decision Making      DDx: Upper GI bleed, lower GI bleed, gastric ulcer, perforated gastric ulcer    Plan: Abdominal labs, GI medications, CT scan and admission    See ED course for further discussion      Amount and/or Complexity of Data Reviewed  Labs: ordered. Decision-making details documented in ED Course.  Radiology: ordered.    Risk  OTC drugs.  Prescription drug management.  Decision regarding hospitalization.        ED Course as of 03/20/25 2149   Thu Mar 20, 2025   2011 Replating electrolytes.  Will perform CT scan.  Patient will require admission to hospital.   2011 Sodium(!): 132   2011 Potassium(!): 2.5   2011 Chloride(!): 92   2011 ANION GAP(!): 15   2011 WBC: 6.17   2011 Hemoglobin(!): 10.9   2011 Hematocrit(!): 34.1   2011 MCH(!): 24.9   2148 Patient will be admitted to medical service.  CT scan shows enterocolitis and esophagitis.  Hemodynamically stable.  No indication for blood transfusion.  Covered with Reglan, PPI, Tylenol and potassium was repleted.       Medications   sodium chloride 0.9 % bolus 1,000 mL (1,000 mL Intravenous New Bag 3/20/25 2006)   potassium chloride 20 mEq IVPB (premix) (20 mEq Intravenous New Bag  3/20/25 1953)   pantoprazole (PROTONIX) injection 40 mg (40 mg Intravenous Given 3/20/25 1953)   metoclopramide (REGLAN) injection 10 mg (10 mg Intravenous Given 3/20/25 1952)   acetaminophen (TYLENOL) tablet 650 mg (650 mg Oral Given 3/20/25 1952)   sucralfate (CARAFATE) tablet 1 g (1 g Oral Given 3/20/25 1952)   potassium chloride oral solution 40 mEq (40 mEq Oral Given 3/20/25 1952)   iohexol (OMNIPAQUE) 350 MG/ML injection (SINGLE-DOSE) 85 mL (85 mL Intravenous Given 3/20/25 2013)       ED Risk Strat Scores                                                History of Present Illness       Chief Complaint   Patient presents with    Abdominal Pain     Mid epigastric pain and vomiting since Monday       Past Medical History:   Diagnosis Date    Anemia     Anxiety     Arthritis     Back pain at L4-L5 level     Schreiber esophagus     Bulimia     Colon polyp     Disease of thyroid gland     hypothyroid    GERD (gastroesophageal reflux disease)     Hearing loss in left ear     History of transfusion     Hyperlipidemia     Hypertension     Hypothyroidism     Leukopenia     NMS (neuroleptic malignant syndrome) 01/03/2020    Pneumonia     RA (rheumatoid arthritis) (HCC)     in feet    Sciatica     Seizure (HCC)     due to medication mix up 8/2018 only one historically    Skin spots, red     lower right arm - poss from cat- no s/s infection    Synovial cyst of lumbar spine     Vertigo     Wears dentures     full set    Weight gain       Past Surgical History:   Procedure Laterality Date    BONE MARROW BIOPSY      BREAST SURGERY      enlargement with implants    CLOSED REDUCTION DISTAL FEMUR FRACTURE      COLONOSCOPY      COSMETIC SURGERY      DENTAL SURGERY      HIP ARTHROPLASTY Right 01/02/2020    Procedure: REVISION TOTAL HIP ARTHROPLASTY WITH REPAIR OF PARIPROSTHETIC FRACTURE - POSTERIOR APPROACH;  Surgeon: Dilan Pedersen MD;  [facility];  Service: Orthopedics    DE AMPUTATION METATARSAL W/TOE SINGLE Left  04/07/2023    Procedure: AMPUTATION TOE 4th;  Surgeon: Conner Bartlett DPM;  Location: [facility];  Service: Podiatry    OK ARTHRP ACETBLR/PROX FEM PROSTC AGRFT/ALGRFT Right 12/11/2019    Procedure: ARTHROPLASTY HIP TOTAL ANTERIOR;  Surgeon: Dilan Pedersen MD;  [facility];  Service: Orthopedics    OK ESOPHAGOGASTRODUODENOSCOPY TRANSORAL DIAGNOSTIC N/A 05/11/2021    Procedure: ESOPHAGOGASTRODUODENOSCOPY (EGD);  Surgeon: David Cedeño MD;  [facility]: [facility];  Service: General    OK LAPS RPR PARAESPHGL HRNA INCL FUNDPLSTY W/MESH N/A 05/11/2021    Procedure: REPAIR HERNIA PARAESOPHAGEAL  LAPAROSCOPIC;  Surgeon: David Cedeño MD;  [facility];  Service: General    OK UNLISTED PROCEDURE ESOPHAGUS N/A 05/11/2021    Procedure: FUNDOPLICATION TRANSORAL INCISIONLESS;  Surgeon: Brad Cadet MD;  [facility]: [facility];  Service: Gastroenterology    RHINOPLASTY      SINUS SURGERY      TOE AMPUTATION Left     2023 4th toe    TRANSORAL INCISIONLESS FUNDOPLICATION (TIF)  05/11/2021      Family History   Problem Relation Age of Onset    Uterine cancer Mother     Esophageal cancer Father     Colon cancer Maternal Grandmother       Social History     Tobacco Use    Smoking status: Never     Passive exposure: Past    Smokeless tobacco: Never   Vaping Use    Vaping status: Never Used   Substance Use Topics    Alcohol use: Not Currently    Drug use: Not Currently      E-Cigarette/Vaping    E-Cigarette Use Never User       E-Cigarette/Vaping Substances    Nicotine No     THC No     CBD No     Flavoring No     Other No     Unknown No       I have reviewed and agree with the history as documented.     64-year-old female presents to the emergency department complaining of hematemesis, melanotic stool, abdominal pain.  Past medical history of gastroparesis, recent EGD that shows grade D esophagitis performed on 2/28/2025.  She states her symptoms have been worsening.  She feels weak and dizzy.  Nausea with vomiting.   Epigastric abdominal pain.  She has been using celecoxib frequently however ran out.  She mentions a history of questionable gastric ulcer.        Review of Systems   Gastrointestinal:  Positive for abdominal pain, diarrhea, nausea and vomiting.   All other systems reviewed and are negative.          Objective       ED Triage Vitals [03/20/25 1607]   Temperature Pulse Blood Pressure Respirations SpO2 Patient Position - Orthostatic VS   97.5 °F (36.4 °C) 101 (!) 171/96 20 98 % Sitting      Temp Source Heart Rate Source BP Location FiO2 (%) Pain Score    Oral Monitor Left arm -- --      Vitals      Date and Time Temp Pulse SpO2 Resp BP Pain Score FACES Pain Rating User   03/20/25 1607 97.5 °F (36.4 °C) 101 98 % 20 171/96 -- -- CEK            Physical Exam  Vitals and nursing note reviewed.   Constitutional:       General: She is not in acute distress.     Appearance: She is well-developed.   HENT:      Head: Normocephalic and atraumatic.   Eyes:      Conjunctiva/sclera: Conjunctivae normal.   Cardiovascular:      Rate and Rhythm: Regular rhythm. Tachycardia present.      Heart sounds: No murmur heard.  Pulmonary:      Effort: Pulmonary effort is normal. No respiratory distress.      Breath sounds: Normal breath sounds.   Abdominal:      Palpations: Abdomen is soft.      Tenderness: There is abdominal tenderness in the epigastric area.   Musculoskeletal:         General: No swelling.      Cervical back: Neck supple.   Skin:     General: Skin is warm and dry.      Capillary Refill: Capillary refill takes less than 2 seconds.   Neurological:      General: No focal deficit present.      Mental Status: She is alert and oriented to person, place, and time. Mental status is at baseline.      GCS: GCS eye subscore is 4. GCS verbal subscore is 5. GCS motor subscore is 6.      Cranial Nerves: No cranial nerve deficit.      Sensory: No sensory deficit.      Motor: No weakness.      Coordination: Coordination normal.      Gait:  Gait normal.   Psychiatric:         Mood and Affect: Mood normal.         Results Reviewed       Procedure Component Value Units Date/Time    Comprehensive metabolic panel [[id number]]  (Abnormal) Collected: 03/20/25 1612    Lab Status: Final result Specimen: Blood from Hand, Left Updated: 03/20/25 1702     Sodium 132 mmol/L      Potassium 2.5 mmol/L      Chloride 92 mmol/L      CO2 25 mmol/L      ANION GAP 15 mmol/L      BUN 7 mg/dL      Creatinine 0.62 mg/dL      Glucose 115 mg/dL      Calcium 10.1 mg/dL      AST 14 U/L      ALT 14 U/L      Alkaline Phosphatase 66 U/L      Total Protein 6.8 g/dL      Albumin 4.0 g/dL      Total Bilirubin 0.29 mg/dL      eGFR 95 ml/min/1.73sq m     Narrative:      National Kid[employer] Foundation guidelines for Chronic Kidney Disease (CKD):     Stage 1 with normal or high GFR (GFR > 90 mL/min/1.73 square meters)    Stage 2 Mild CKD (GFR = 60-89 mL/min/1.73 square meters)    Stage 3A Moderate CKD (GFR = 45-59 mL/min/1.73 square meters)    Stage 3B Moderate CKD (GFR = 30-44 mL/min/1.73 square meters)    Stage 4 Severe CKD (GFR = 15-29 mL/min/1.73 square meters)    Stage 5 End Stage CKD (GFR <15 mL/min/1.73 square meters)  Note: GFR calculation is accurate only with a steady state creatinine    Lipase [[id number]]  (Abnormal) Collected: 03/20/25 1612    Lab Status: Final result Specimen: Blood from Hand, Left Updated: 03/20/25 1702     Lipase 109 u/L     CBC and differential [[id number]]  (Abnormal) Collected: 03/20/25 1612    Lab Status: Final result Specimen: Blood from Hand, Left Updated: 03/20/25 1636     WBC 6.17 Thousand/uL      RBC 4.37 Million/uL      Hemoglobin 10.9 g/dL      Hematocrit 34.1 %      MCV 78 fL      MCH 24.9 pg      MCHC 32.0 g/dL      RDW 14.8 %      MPV 9.3 fL      Platelets 338 Thousands/uL      nRBC 0 /100 WBCs      Segmented % 73 %      Immature Grans % 0 %      Lymphocytes % 15 %      Monocytes % 9 %      Eosinophils Relative 2 %      Basophils Relative 1 %       Absolute Neutrophils 4.50 Thousands/µL      Absolute Immature Grans 0.02 Thousand/uL      Absolute Lymphocytes 0.92 Thousands/µL      Absolute Monocytes 0.57 Thousand/µL      Eosinophils Absolute 0.12 Thousand/µL      Basophils Absolute 0.04 Thousands/µL             CT abdomen pelvis with contrast   Final Interpretation by Orlando De Anda MD (03/20 2102)      Fluid-filled mildly dilated colon and distal small bowel consistent with a diarrheal GI illness versus mild enterocolitis.      Prominent distal esophageal wall thickening consistent with known esophagitis. Similar to priors   No evidence of perforation.      Unremarkable appearance of the pancreas noting slightly elevated lipase on today's labs.      The study was marked in EPIC for immediate notification.         Workstation performed: [id number]             ECG 12 Lead Documentation Only    Date/Time: 3/20/2025 8:09 PM    Performed by: Winston Lr DO  Authorized by: Winstno Lr DO    Indications / Diagnosis:  Weakness  ECG reviewed by me, the ED Provider: yes    Patient location:  ED  Previous ECG:     Previous ECG:  Compared to current    Similarity:  No change    Comparison to cardiac monitor: Yes    Interpretation:     Interpretation: normal    Rate:     ECG rate:  102    ECG rate assessment: tachycardic    Rhythm:     Rhythm: sinus tachycardia    Ectopy:     Ectopy: none    QRS:     QRS axis:  Left    QRS intervals:  Normal  Conduction:     Conduction: normal    ST segments:     ST segments:  Normal  T waves:     T waves: normal        ED Medication and Procedure Management   Prior to Admission Medications   Prescriptions Last Dose Informant Patient Reported? Taking?   LORazepam (ATIVAN) 2 mg tablet   No No   Sig: Take 1 tablet (2 mg total) by mouth every 6 (six) hours as needed for anxiety Max 5 times a day   PARoxetine (PAXIL) 30 mg tablet   No No   Sig: Take 2 tablets (60 mg total) by mouth in the morning   QUEtiapine (SEROquel  XR) 400 mg 24 hr tablet   No No   Sig: Take 1 tablet (400 mg total) by mouth daily at bedtime   amLODIPine (NORVASC) 5 mg tablet   No No   Sig: TAKE 1 TABLET (5 MG TOTAL) BY MOUTH DAILY.   celecoxib (CeleBREX) 200 mg capsule   No No   Sig: Take 1 capsule (200 mg total) by mouth daily as needed for mild pain   levothyroxine 25 mcg tablet   No No   Sig: Take 1 tablet (25 mcg total) by mouth daily   loratadine (CLARITIN) 10 mg tablet   No No   Sig: Take 1 tablet (10 mg total) by mouth daily   metoclopramide (REGLAN) 5 mg tablet   No No   Sig: Take 1 tablet (5 mg total) by mouth 3 (three) times a day before meals   omeprazole (PriLOSEC) 40 MG capsule   No No   Sig: Take 1 capsule (40 mg total) by mouth 2 (two) times a day   ondansetron (ZOFRAN) 4 mg tablet   No No   Sig: Take 1 tablet (4 mg total) by mouth every 8 (eight) hours as needed for nausea or vomiting   sucralfate (CARAFATE) 1 g tablet   No No   Sig: TAKE 1 TABLET (1 G TOTAL) BY MOUTH 3 (THREE) TIMES A DAY WITH MEALS      Facility-Administered Medications: None     Patient's Medications   Discharge Prescriptions    No medications on file     No discharge procedures on file.  ED SEPSIS DOCUMENTATION   Time reflects when diagnosis was documented in both MDM as applicable and the Disposition within this note       Time User Action Codes Description Comment    3/20/2025  8:48 PM Winston Lr [K92.0] Hematemesis     3/20/2025  8:48 PM Winston Lr [K92.1] Melena     3/20/2025  8:48 PM Winston Lr [E87.6] Hypokalemia     3/20/2025  8:48 PM Winston Lr [K21.00] Gastroesophageal reflux disease with esophagitis without hemorrhage                  Winston Lr DO  03/20/25 3476

## 2025-03-20 NOTE — Clinical Note
Case was discussed with Dr. Sharif and the patient's admission status was agreed to be Admission Status: inpatient status to the service of Dr. Aleman .

## 2025-03-21 ENCOUNTER — ANESTHESIA EVENT (INPATIENT)
Dept: GASTROENTEROLOGY | Facility: HOSPITAL | Age: 65
DRG: 369 | End: 2025-03-21
Payer: COMMERCIAL

## 2025-03-21 ENCOUNTER — PREP FOR PROCEDURE (OUTPATIENT)
Dept: GASTROENTEROLOGY | Facility: CLINIC | Age: 65
End: 2025-03-21

## 2025-03-21 ENCOUNTER — APPOINTMENT (INPATIENT)
Dept: GASTROENTEROLOGY | Facility: HOSPITAL | Age: 65
DRG: 369 | End: 2025-03-21
Payer: COMMERCIAL

## 2025-03-21 ENCOUNTER — ANESTHESIA (INPATIENT)
Dept: GASTROENTEROLOGY | Facility: HOSPITAL | Age: 65
DRG: 369 | End: 2025-03-21
Payer: COMMERCIAL

## 2025-03-21 DIAGNOSIS — Z98.890 HISTORY OF REPAIR OF HIATAL HERNIA: ICD-10-CM

## 2025-03-21 DIAGNOSIS — K21.9 HIATAL HERNIA WITH GERD WITHOUT ESOPHAGITIS: Primary | ICD-10-CM

## 2025-03-21 DIAGNOSIS — K44.9 HIATAL HERNIA WITH GERD WITHOUT ESOPHAGITIS: Primary | ICD-10-CM

## 2025-03-21 DIAGNOSIS — Z87.19 HISTORY OF REPAIR OF HIATAL HERNIA: ICD-10-CM

## 2025-03-21 LAB
ANION GAP SERPL CALCULATED.3IONS-SCNC: 7 MMOL/L (ref 4–13)
BUN SERPL-MCNC: 5 MG/DL (ref 5–25)
CALCIUM SERPL-MCNC: 8.3 MG/DL (ref 8.4–10.2)
CHLORIDE SERPL-SCNC: 104 MMOL/L (ref 96–108)
CO2 SERPL-SCNC: 26 MMOL/L (ref 21–32)
CREAT SERPL-MCNC: 0.52 MG/DL (ref 0.6–1.3)
ERYTHROCYTE [DISTWIDTH] IN BLOOD BY AUTOMATED COUNT: 15 % (ref 11.6–15.1)
GFR SERPL CREATININE-BSD FRML MDRD: 101 ML/MIN/1.73SQ M
GLUCOSE SERPL-MCNC: 98 MG/DL (ref 65–140)
HCT VFR BLD AUTO: 28.2 % (ref 34.8–46.1)
HEMOCCULT STL QL IA: POSITIVE
HGB BLD-MCNC: 8.6 G/DL (ref 11.5–15.4)
MAGNESIUM SERPL-MCNC: 1.3 MG/DL (ref 1.9–2.7)
MAGNESIUM SERPL-MCNC: 2.1 MG/DL (ref 1.9–2.7)
MCH RBC QN AUTO: 24.7 PG (ref 26.8–34.3)
MCHC RBC AUTO-ENTMCNC: 30.5 G/DL (ref 31.4–37.4)
MCV RBC AUTO: 81 FL (ref 82–98)
PLATELET # BLD AUTO: 244 THOUSANDS/UL (ref 149–390)
PMV BLD AUTO: 9.5 FL (ref 8.9–12.7)
POTASSIUM SERPL-SCNC: 3.4 MMOL/L (ref 3.5–5.3)
POTASSIUM SERPL-SCNC: 3.5 MMOL/L (ref 3.5–5.3)
RBC # BLD AUTO: 3.48 MILLION/UL (ref 3.81–5.12)
SODIUM SERPL-SCNC: 137 MMOL/L (ref 135–147)
WBC # BLD AUTO: 3.08 THOUSAND/UL (ref 4.31–10.16)

## 2025-03-21 PROCEDURE — 99232 SBSQ HOSP IP/OBS MODERATE 35: CPT

## 2025-03-21 PROCEDURE — 85027 COMPLETE CBC AUTOMATED: CPT | Performed by: INTERNAL MEDICINE

## 2025-03-21 PROCEDURE — 36415 COLL VENOUS BLD VENIPUNCTURE: CPT | Performed by: INTERNAL MEDICINE

## 2025-03-21 PROCEDURE — G0328 FECAL BLOOD SCRN IMMUNOASSAY: HCPCS | Performed by: INTERNAL MEDICINE

## 2025-03-21 PROCEDURE — 43239 EGD BIOPSY SINGLE/MULTIPLE: CPT | Performed by: INTERNAL MEDICINE

## 2025-03-21 PROCEDURE — 80048 BASIC METABOLIC PNL TOTAL CA: CPT | Performed by: INTERNAL MEDICINE

## 2025-03-21 PROCEDURE — 99222 1ST HOSP IP/OBS MODERATE 55: CPT | Performed by: INTERNAL MEDICINE

## 2025-03-21 PROCEDURE — 83735 ASSAY OF MAGNESIUM: CPT | Performed by: INTERNAL MEDICINE

## 2025-03-21 PROCEDURE — 84132 ASSAY OF SERUM POTASSIUM: CPT | Performed by: INTERNAL MEDICINE

## 2025-03-21 PROCEDURE — 0DB38ZX EXCISION OF LOWER ESOPHAGUS, VIA NATURAL OR ARTIFICIAL OPENING ENDOSCOPIC, DIAGNOSTIC: ICD-10-PCS | Performed by: INTERNAL MEDICINE

## 2025-03-21 RX ORDER — AMLODIPINE BESYLATE 5 MG/1
5 TABLET ORAL DAILY
Status: DISCONTINUED | OUTPATIENT
Start: 2025-03-21 | End: 2025-03-23 | Stop reason: HOSPADM

## 2025-03-21 RX ORDER — SODIUM CHLORIDE 9 MG/ML
100 INJECTION, SOLUTION INTRAVENOUS CONTINUOUS
Status: DISCONTINUED | OUTPATIENT
Start: 2025-03-21 | End: 2025-03-22

## 2025-03-21 RX ORDER — LORAZEPAM 1 MG/1
2 TABLET ORAL EVERY 6 HOURS PRN
Status: DISCONTINUED | OUTPATIENT
Start: 2025-03-21 | End: 2025-03-23 | Stop reason: HOSPADM

## 2025-03-21 RX ORDER — SODIUM CHLORIDE 9 MG/ML
INJECTION, SOLUTION INTRAVENOUS CONTINUOUS PRN
Status: DISCONTINUED | OUTPATIENT
Start: 2025-03-21 | End: 2025-03-21

## 2025-03-21 RX ORDER — PAROXETINE 20 MG/1
60 TABLET, FILM COATED ORAL DAILY
Status: DISCONTINUED | OUTPATIENT
Start: 2025-03-21 | End: 2025-03-23 | Stop reason: HOSPADM

## 2025-03-21 RX ORDER — LEVOTHYROXINE SODIUM 25 UG/1
25 TABLET ORAL
Status: DISCONTINUED | OUTPATIENT
Start: 2025-03-21 | End: 2025-03-23 | Stop reason: HOSPADM

## 2025-03-21 RX ORDER — METOCLOPRAMIDE HYDROCHLORIDE 5 MG/ML
5 INJECTION INTRAMUSCULAR; INTRAVENOUS EVERY 8 HOURS SCHEDULED
Status: DISCONTINUED | OUTPATIENT
Start: 2025-03-21 | End: 2025-03-23 | Stop reason: HOSPADM

## 2025-03-21 RX ORDER — LIDOCAINE HCL/PF 100 MG/5ML
SYRINGE (ML) INJECTION AS NEEDED
Status: DISCONTINUED | OUTPATIENT
Start: 2025-03-21 | End: 2025-03-21

## 2025-03-21 RX ORDER — SUCRALFATE 1 G/1
1 TABLET ORAL
Status: DISCONTINUED | OUTPATIENT
Start: 2025-03-21 | End: 2025-03-23 | Stop reason: HOSPADM

## 2025-03-21 RX ORDER — MAGNESIUM SULFATE HEPTAHYDRATE 40 MG/ML
2 INJECTION, SOLUTION INTRAVENOUS ONCE
Status: COMPLETED | OUTPATIENT
Start: 2025-03-21 | End: 2025-03-21

## 2025-03-21 RX ORDER — PROPOFOL 10 MG/ML
INJECTION, EMULSION INTRAVENOUS AS NEEDED
Status: DISCONTINUED | OUTPATIENT
Start: 2025-03-21 | End: 2025-03-21

## 2025-03-21 RX ORDER — PROPOFOL 10 MG/ML
INJECTION, EMULSION INTRAVENOUS CONTINUOUS PRN
Status: DISCONTINUED | OUTPATIENT
Start: 2025-03-21 | End: 2025-03-21

## 2025-03-21 RX ORDER — OXYCODONE HYDROCHLORIDE 5 MG/1
5 TABLET ORAL EVERY 4 HOURS PRN
Refills: 0 | Status: DISCONTINUED | OUTPATIENT
Start: 2025-03-21 | End: 2025-03-23 | Stop reason: HOSPADM

## 2025-03-21 RX ORDER — LORATADINE 10 MG/1
10 TABLET ORAL DAILY
Status: DISCONTINUED | OUTPATIENT
Start: 2025-03-21 | End: 2025-03-23 | Stop reason: HOSPADM

## 2025-03-21 RX ORDER — PANTOPRAZOLE SODIUM 40 MG/1
40 TABLET, DELAYED RELEASE ORAL
Status: DISCONTINUED | OUTPATIENT
Start: 2025-03-21 | End: 2025-03-23 | Stop reason: HOSPADM

## 2025-03-21 RX ORDER — SODIUM CHLORIDE, SODIUM LACTATE, POTASSIUM CHLORIDE, CALCIUM CHLORIDE 600; 310; 30; 20 MG/100ML; MG/100ML; MG/100ML; MG/100ML
125 INJECTION, SOLUTION INTRAVENOUS CONTINUOUS
OUTPATIENT
Start: 2025-03-21

## 2025-03-21 RX ORDER — QUETIAPINE 200 MG/1
400 TABLET, FILM COATED, EXTENDED RELEASE ORAL
Status: DISCONTINUED | OUTPATIENT
Start: 2025-03-21 | End: 2025-03-23 | Stop reason: HOSPADM

## 2025-03-21 RX ADMIN — METOCLOPRAMIDE 5 MG: 5 INJECTION, SOLUTION INTRAMUSCULAR; INTRAVENOUS at 02:03

## 2025-03-21 RX ADMIN — MAGNESIUM SULFATE HEPTAHYDRATE 2 G: 40 INJECTION, SOLUTION INTRAVENOUS at 02:02

## 2025-03-21 RX ADMIN — LORATADINE 10 MG: 10 TABLET ORAL at 08:15

## 2025-03-21 RX ADMIN — SUCRALFATE 1 G: 1 TABLET ORAL at 12:19

## 2025-03-21 RX ADMIN — POTASSIUM CHLORIDE 40 MEQ: 1500 TABLET, EXTENDED RELEASE ORAL at 02:03

## 2025-03-21 RX ADMIN — SUCRALFATE 1 G: 1 TABLET ORAL at 06:48

## 2025-03-21 RX ADMIN — SODIUM CHLORIDE 100 ML/HR: 0.9 INJECTION, SOLUTION INTRAVENOUS at 02:03

## 2025-03-21 RX ADMIN — SODIUM CHLORIDE: 0.9 INJECTION, SOLUTION INTRAVENOUS at 16:30

## 2025-03-21 RX ADMIN — PROPOFOL 100 MCG/KG/MIN: 10 INJECTION, EMULSION INTRAVENOUS at 16:43

## 2025-03-21 RX ADMIN — LEVOTHYROXINE SODIUM 25 MCG: 0.03 TABLET ORAL at 06:08

## 2025-03-21 RX ADMIN — SUCRALFATE 1 G: 1 TABLET ORAL at 15:38

## 2025-03-21 RX ADMIN — QUETIAPINE 400 MG: 200 TABLET, FILM COATED, EXTENDED RELEASE ORAL at 21:01

## 2025-03-21 RX ADMIN — PROPOFOL 50 MG: 10 INJECTION, EMULSION INTRAVENOUS at 16:43

## 2025-03-21 RX ADMIN — QUETIAPINE 400 MG: 200 TABLET, FILM COATED, EXTENDED RELEASE ORAL at 02:17

## 2025-03-21 RX ADMIN — SODIUM CHLORIDE: 0.9 INJECTION, SOLUTION INTRAVENOUS at 16:42

## 2025-03-21 RX ADMIN — PAROXETINE HYDROCHLORIDE 60 MG: 20 TABLET, FILM COATED ORAL at 08:15

## 2025-03-21 RX ADMIN — METOCLOPRAMIDE 5 MG: 5 INJECTION, SOLUTION INTRAMUSCULAR; INTRAVENOUS at 17:26

## 2025-03-21 RX ADMIN — LIDOCAINE HYDROCHLORIDE 60 MG: 20 INJECTION INTRAVENOUS at 16:43

## 2025-03-21 RX ADMIN — PANTOPRAZOLE SODIUM 40 MG: 40 TABLET, DELAYED RELEASE ORAL at 17:27

## 2025-03-21 RX ADMIN — SODIUM CHLORIDE 500 ML: 0.9 INJECTION, SOLUTION INTRAVENOUS at 07:32

## 2025-03-21 RX ADMIN — PANTOPRAZOLE SODIUM 40 MG: 40 INJECTION, POWDER, FOR SOLUTION INTRAVENOUS at 06:09

## 2025-03-21 NOTE — PROGRESS NOTES
Progress Note - Hospitalist   Name: Irene Plascencia 64 y.o. female I MRN: [id number]  [facility] I Date of Admission: 3/20/2025   Date of Service: 3/21/2025 I Hospital Day: 1    Assessment & Plan  Hematemesis  Patient reports 2 episodes of hematemesis that resolved through 3/19, patient continues to report melena morning of 3/21  Wonder if due to Eloisa-Navas tears given multiple vomiting episodes.  Positive chronic NSAID use  History of esophagitis; status post recent EGD 2/28/2025 revealing grade D esophagitis, hiatal hernia  With ABLA s/p repeat am cbc. Wonder if element of hemodilution, will repeat H/H at 12 noon  Status post CT abdomen/pelvis-shows likely enteritis and esophagitis  Placed on IV PPI twice daily  Continue sucralfate  Hold Celebrex  Keep n.p.o.  Hemoglobin dropped to 8.6, baseline between 10 -12, continue to trend hemoglobin  Consulted GI-to undergo EGD today, continue with PPI, Carafate, Reglan  Hypokalemia  Secondary to GI losses  Potassium 2.5 while in ED, was given repletion of magnesium and potassium while in ED  Potassium improved to 3.5 today  Can discontinue telemetry  Continue to monitor  Chronic hyponatremia  Continue IV fluid hydration  Sodium 132 on admission, improved to 137  Continue to trend with daily BMP  Acquired hypothyroidism  Continue levothyroxine  Psychiatric disorder  History of schizoaffective disorder, anxiety  Continue meds  Primary hypertension  Patient had soft BP this a.m. and required a bolus of IVF  After bolus patient normotensive and stable  Soft BP likely in setting of medications and n.p.o. status  Continue to monitor blood pressure and trend vitals  Gastroparesis  Longstanding history  On scheduled Reglan, will continue  And GI following    Melena  Patient had numerous reports of melena over the last week  To go down for EGD today per GI, may need colonoscopy, however not bowel prepped    VTE Pharmacologic Prophylaxis:   Moderate Risk (Score  3-4) - Pharmacological DVT Prophylaxis Contraindicated. Sequential Compression Devices Ordered.    Mobility:      -HL Goal achieved. Continue to encourage appropriate mobility.    Patient Centered Rounds: I performed bedside rounds with nursing staff today.   Discussions with Specialists or Other Care Team Provider: CM, GI    Education and Discussions with Family / Patient: Attempted to update  (significant other) via phone. Left voicemail.     Current Length of Stay: 1 day(s)  Current Patient Status: Inpatient   Certification Statement: The patient will continue to require additional inpatient hospital stay due to awaiting EGD and evaluation for possible need for colonoscopy in setting of hematemesis/melena  Discharge Plan: Anticipate discharge in 24-48 hrs to home.    Code Status: Level 1 - Full Code    Subjective   Patient reports to be feeling relatively well.  Currently denies any chest pain/pressure, palpitations, lightness, nausea, shortness of breath, or chills.  Patient reports last episode of melena early this a.m., no vomiting or nausea reported today    Objective :  Temp:  [97.5 °F (36.4 °C)-98.3 °F (36.8 °C)] 98.3 °F (36.8 °C)  HR:  [] 91  BP: ()/(52-96) 103/65  Resp:  [12-22] 18  SpO2:  [92 %-98 %] 98 %  O2 Device: Nasal cannula    Body mass index is 32.06 kg/m².     Input and Output Summary (last 24 hours):   No intake or output data in the 24 hours ending 03/21/25 1351    Physical Exam  Vitals and nursing note reviewed.   Constitutional:       General: She is not in acute distress.     Appearance: She is obese. She is not ill-appearing, toxic-appearing or diaphoretic.   HENT:      Head: Normocephalic.      Nose: Nose normal.      Mouth/Throat:      Mouth: Mucous membranes are moist.      Pharynx: Oropharynx is clear.   Eyes:      General: No scleral icterus.     Conjunctiva/sclera: Conjunctivae normal.      Pupils: Pupils are equal, round, and reactive to light.    Cardiovascular:      Rate and Rhythm: Normal rate and regular rhythm.      Heart sounds: No murmur heard.     No friction rub. No gallop.   Pulmonary:      Effort: Pulmonary effort is normal. No respiratory distress.      Breath sounds: Normal breath sounds. No stridor. No wheezing, rhonchi or rales.   Abdominal:      General: Abdomen is flat.      Palpations: Abdomen is soft.   Musculoskeletal:         General: Normal range of motion.      Cervical back: Normal range of motion and neck supple.      Right lower leg: No edema.      Left lower leg: No edema.   Lymphadenopathy:      Cervical: No cervical adenopathy.   Skin:     General: Skin is warm.      Coloration: Skin is not jaundiced or pale.      Findings: No bruising, erythema or lesion.   Neurological:      General: No focal deficit present.      Mental Status: She is alert and oriented to person, place, and time. Mental status is at baseline.      Cranial Nerves: No cranial nerve deficit.      Motor: No weakness.   Psychiatric:         Mood and Affect: Mood normal.         Behavior: Behavior normal.         Thought Content: Thought content normal.           Lines/Drains:        Telemetry:  Telemetry Orders (From admission, onward)               24 Hour Telemetry Monitoring  Continuous x 24 Hours (Telem)        Expiring   Question:  Reason for 24 Hour Telemetry  Answer:  Metabolic/electrolyte disturbance with high probability of dysrhythmia. K level <3 or >6 OR KCL infusion >10mEq/hr                     Telemetry Reviewed: Normal Sinus Rhythm  Indication for Continued Telemetry Use: No indication for continued use. Will discontinue.                Lab Results: I have reviewed the following results:   Results from last 7 days   Lab Units 03/21/25  0502 03/20/25  1612   WBC Thousand/uL 3.08* 6.17   HEMOGLOBIN g/dL 8.6* 10.9*   HEMATOCRIT % 28.2* 34.1*   PLATELETS Thousands/uL 244 338   SEGS PCT %  --  73   LYMPHO PCT %  --  15   MONO PCT %  --  9   EOS PCT %   --  2     Results from last 7 days   Lab Units 03/21/25  0502 03/21/25  0047 03/20/25  1612   SODIUM mmol/L 137  --  132*   POTASSIUM mmol/L 3.5   < > 2.5*   CHLORIDE mmol/L 104  --  92*   CO2 mmol/L 26  --  25   BUN mg/dL 5  --  7   CREATININE mg/dL 0.52*  --  0.62   ANION GAP mmol/L 7  --  15*   CALCIUM mg/dL 8.3*  --  10.1   ALBUMIN g/dL  --   --  4.0   TOTAL BILIRUBIN mg/dL  --   --  0.29   ALK PHOS U/L  --   --  66   ALT U/L  --   --  14   AST U/L  --   --  14   GLUCOSE RANDOM mg/dL 98  --  115    < > = values in this interval not displayed.                       Recent Cultures (last 7 days):         Imaging Results Review: No pertinent imaging studies reviewed.  Other Study Results Review: No additional pertinent studies reviewed.    Last 24 Hours Medication List:     Current Facility-Administered Medications:     acetaminophen (TYLENOL) tablet 650 mg, Q6H PRN    amLODIPine (NORVASC) tablet 5 mg, Daily    levothyroxine tablet 25 mcg, Early Morning    loratadine (CLARITIN) tablet 10 mg, Daily    LORazepam (ATIVAN) tablet 2 mg, Q6H PRN    metoclopramide (REGLAN) injection 5 mg, Q8H YUMIKO    ondansetron (ZOFRAN) injection 4 mg, Q6H PRN    oxyCODONE (ROXICODONE) IR tablet 5 mg, Q4H PRN    oxyCODONE (ROXICODONE) split tablet 2.5 mg, Q4H PRN    pantoprazole (PROTONIX) injection 40 mg, Q12H YUMIKO    PARoxetine (PAXIL) tablet 60 mg, Daily    QUEtiapine (SEROquel XR) 24 hr tablet 400 mg, HS    sodium chloride 0.9 % infusion, Continuous, Last Rate: 100 mL/hr (03/21/25 0203)    sucralfate (CARAFATE) tablet 1 g, TID With Meals    Administrative Statements   Today, Patient Was Seen By: Rakan Lantigua PA-C  I have spent a total time of 35 minutes in caring for this patient on the day of the visit/encounter including Documenting in the medical record, Reviewing/placing orders in the medical record (including tests, medications, and/or procedures), Obtaining or reviewing history  , and Communicating with other healthcare  professionals .    **Please Note: This note may have been constructed using a voice recognition system.**

## 2025-03-21 NOTE — ASSESSMENT & PLAN NOTE
Secondary to GI losses  Potassium 2.5 while in ED, was given repletion of magnesium and potassium while in ED  Potassium improved to 3.5 today  Can discontinue telemetry  Continue to monitor

## 2025-03-21 NOTE — ASSESSMENT & PLAN NOTE
Patient had numerous reports of melena over the last week  To go down for EGD today per GI, may need colonoscopy, however not bowel prepped

## 2025-03-21 NOTE — UTILIZATION REVIEW
Initial Clinical Review    Admission: Date/Time/Statement:   Admission Orders (From admission, onward)       Ordered        03/20/25 2145  INPATIENT ADMISSION  Once                          Orders Placed This Encounter   Procedures    INPATIENT ADMISSION     Standing Status:   Standing     Number of Occurrences:   1     Level of Care:   Med Surg [16]     Estimated length of stay:   More than 2 Midnights     Certification:   I certify that inpatient services are medically necessary for this patient for a duration of greater than two midnights. See H&P and MD Progress Notes for additional information about the patient's course of treatment.     ED Arrival Information       Expected   -    Arrival   3/20/2025 15:42    Acuity   Urgent              Means of arrival   Ambulance    Escorted by   [facility]    Service   Hospitalist    Admission type   Emergency              Arrival complaint   -             Chief Complaint   Patient presents with    Abdominal Pain     Mid epigastric pain and vomiting since Monday       Initial Presentation: 64 y.o. female  with a PMH of esophagitis, gastroparesis, HTN, hypothyroidism, chronic hyponatremia presented to the ED from home via EMS w/ hematemesis, melena since Tuesday.   Pt reports multiple episodes of vomiting and epigastric abd pain. Had recent OP upper endoscopy on 02/28 revealing grade D esophagitis, hiatal hernia . Reports chronic use of Celebrex.  In the ED, Hgb 10.9, , K 2.5.  On exam, abdominal tenderness present.   Given IV Protonix, IV Reglan, 1L IVF bolus, po and IV KCl, po sucralfate.    Admit as Inpatient for evaluation and treatment of Hematemesis, hypokalemia.  Plan: repeat H/H at 12 noon. F/u CT a/p. IV PPI daily. Cont sucralfate. Hold celebrex. Keep NPO. Replete K, Mag. Tele. Mon electrolytes, replete as needed.    Anticipated Length of Stay/Certification Statement:  Patient will be admitted on an inpatient basis with an anticipated length of  stay of greater than 2 midnights secondary to hematemesis/melena, hypokalemia.     Date: 03/21   Day 2:   GI Consult: Hematemesis, Melena:  Pt hypotensive on arrival, given 1/5 L IVF bolus. Labs: WBC 3.08, Hgb 10.6 (baseline 12), and platelet wnl. Hgb dropped to 8.6 this morning.   Plan: Currently NPO, will plan for EGD today. Continue Protonix 40 mg BID, Reglan 5mg po TID, and Carafate 1 g TID. Continue to monitor Hgb, transfuse if < 7.0      ED Treatment-Medication Administration from 03/20/2025 1542 to 03/21/2025 1127         Date/Time Order Dose Route Action     03/20/2025 1953 pantoprazole (PROTONIX) injection 40 mg 40 mg Intravenous Given     03/20/2025 1952 metoclopramide (REGLAN) injection 10 mg 10 mg Intravenous Given     03/20/2025 1952 acetaminophen (TYLENOL) tablet 650 mg 650 mg Oral Given     03/20/2025 2006 sodium chloride 0.9 % bolus 1,000 mL 1,000 mL Intravenous New Bag     03/20/2025 1952 sucralfate (CARAFATE) tablet 1 g 1 g Oral Given     03/20/2025 1952 potassium chloride oral solution 40 mEq 40 mEq Oral Given     03/20/2025 1953 potassium chloride 20 mEq IVPB (premix) 20 mEq Intravenous New Bag     03/20/2025 2250 potassium chloride 20 mEq IVPB (premix) 20 mEq Intravenous New Bag     03/20/2025 2013 iohexol (OMNIPAQUE) 350 MG/ML injection (SINGLE-DOSE) 85 mL 85 mL Intravenous Given     03/21/2025 0609 pantoprazole (PROTONIX) injection 40 mg 40 mg Intravenous Given     03/21/2025 0203 potassium chloride (Klor-Con M20) CR tablet 40 mEq 40 mEq Oral Given     03/21/2025 0608 levothyroxine tablet 25 mcg 25 mcg Oral Given     03/21/2025 0815 loratadine (CLARITIN) tablet 10 mg 10 mg Oral Given     03/21/2025 0815 PARoxetine (PAXIL) tablet 60 mg 60 mg Oral Given     03/21/2025 0217 QUEtiapine (SEROquel XR) 24 hr tablet 400 mg 400 mg Oral Given     03/21/2025 0648 sucralfate (CARAFATE) tablet 1 g 1 g Oral Given     03/21/2025 0203 metoclopramide (REGLAN) injection 5 mg 5 mg Intravenous Given      03/21/2025 0202 magnesium sulfate 2 g/50 mL IVPB (premix) 2 g 2 g Intravenous New Bag     03/21/2025 0203 sodium chloride 0.9 % infusion 100 mL/hr Intravenous New Bag     03/21/2025 0732 sodium chloride 0.9 % bolus 500 mL 500 mL Intravenous New Bag            Scheduled Medications:  amLODIPine, 5 mg, Oral, Daily  levothyroxine, 25 mcg, Oral, Early Morning  loratadine, 10 mg, Oral, Daily  metoclopramide, 5 mg, Intravenous, Q8H YUMIKO  pantoprazole, 40 mg, Intravenous, Q12H YUMIKO  PARoxetine, 60 mg, Oral, Daily  QUEtiapine, 400 mg, Oral, HS  sucralfate, 1 g, Oral, TID With Meals      Continuous IV Infusions:  sodium chloride, 100 mL/hr, Intravenous, Continuous      PRN Meds:  acetaminophen, 650 mg, Oral, Q6H PRN  LORazepam, 2 mg, Oral, Q6H PRN  ondansetron, 4 mg, Intravenous, Q6H PRN  oxyCODONE, 5 mg, Oral, Q4H PRN  oxyCODONE, 2.5 mg, Oral, Q4H PRN      ED Triage Vitals [03/20/25 1607]   Temperature Pulse Respirations Blood Pressure SpO2 Pain Score   97.5 °F (36.4 °C) 101 20 (!) 171/96 98 % --     Weight (last 2 days)       Date/Time Weight    03/20/25 1607 79.4 (175)            Vital Signs (last 3 days)       Date/Time Temp Pulse Resp BP MAP (mmHg) SpO2 O2 Device Patient Position - Orthostatic VS Marcella Coma Scale Score    03/21/25 1108 -- 83 22 104/58 75 97 % -- -- --    03/21/25 1000 -- 85 18 103/54 72 96 % -- -- --    03/21/25 0915 -- 91 12 104/64 78 97 % -- -- --    03/21/25 0815 -- 85 16 102/63 76 97 % -- -- --    03/21/25 0800 -- 75 17 87/54 67 97 % -- -- --    03/21/25 0745 -- 79 -- 86/57 67 92 % -- -- --    03/21/25 0725 -- 83 -- 84/52  64 94 % -- -- --    03/21/25 0720 -- -- -- -- -- -- -- -- 15    03/21/25 0610 -- 91 -- 92/55 67 94 % Nasal cannula Lying --    03/20/25 2257 -- 89 17 120/65 84 95 % None (Room air) Lying --    03/20/25 1607 97.5 °F (36.4 °C) 101 20 171/96 -- 98 % None (Room air) Sitting --              Pertinent Labs/Diagnostic Test Results:   Radiology:  CT abdomen pelvis with contrast   Final  Interpretation by Orlando De Anda MD (03/20 2102)      Fluid-filled mildly dilated colon and distal small bowel consistent with a diarrheal GI illness versus mild enterocolitis.      Prominent distal esophageal wall thickening consistent with known esophagitis. Similar to priors   No evidence of perforation.      Unremarkable appearance of the pancreas noting slightly elevated lipase on today's labs.      The study was marked in EPIC for immediate notification.         Workstation performed: [id number]           Cardiology:  ECG 12 lead   Final Result by Anand Michelle MD (03/20 2111)   Sinus tachycardia   Possible Left atrial enlargement   Left ventricular hypertrophy with repolarization abnormality   Abnormal ECG   When compared with ECG of 12-Jan-2025 04:40,   Minimal criteria for Anterior infarct are no longer Present   Confirmed by Anand Michelle ([id number]) on 3/20/2025 9:11:18 PM        GI:  No orders to display           Results from last 7 days   Lab Units 03/21/25  0502 03/20/25  1612   WBC Thousand/uL 3.08* 6.17   HEMOGLOBIN g/dL 8.6* 10.9*   HEMATOCRIT % 28.2* 34.1*   PLATELETS Thousands/uL 244 338   TOTAL NEUT ABS Thousands/µL  --  4.50         Results from last 7 days   Lab Units 03/21/25  0502 03/21/25  0047 03/20/25  1612   SODIUM mmol/L 137  --  132*   POTASSIUM mmol/L 3.5 3.4* 2.5*   CHLORIDE mmol/L 104  --  92*   CO2 mmol/L 26  --  25   ANION GAP mmol/L 7  --  15*   BUN mg/dL 5  --  7   CREATININE mg/dL 0.52*  --  0.62   EGFR ml/min/1.73sq m 101  --  95   CALCIUM mg/dL 8.3*  --  10.1   MAGNESIUM mg/dL 2.1 1.3*  --      Results from last 7 days   Lab Units 03/20/25  1612   AST U/L 14   ALT U/L 14   ALK PHOS U/L 66   TOTAL PROTEIN g/dL 6.8   ALBUMIN g/dL 4.0   TOTAL BILIRUBIN mg/dL 0.29         Results from last 7 days   Lab Units 03/21/25  0502 03/20/25  1612   GLUCOSE RANDOM mg/dL 98 115                 Results from last 7 days   Lab Units 03/20/25  1612   LIPASE u/L 109*           Past Medical  History:   Diagnosis Date    Anemia     Anxiety     Arthritis     Back pain at L4-L5 level     Schreiber esophagus     Bulimia     Colon polyp     Disease of thyroid gland     hypothyroid    GERD (gastroesophageal reflux disease)     Hearing loss in left ear     History of transfusion     Hyperlipidemia     Hypertension     Hypothyroidism     Leukopenia     NMS (neuroleptic malignant syndrome) 01/03/2020    Pneumonia     RA (rheumatoid arthritis) (HCC)     in feet    Sciatica     Seizure (HCC)     due to medication mix up 8/2018 only one historically    Skin spots, red     lower right arm - poss from cat- no s/s infection    Synovial cyst of lumbar spine     Vertigo     Wears dentures     full set    Weight gain      Present on Admission:   Chronic hyponatremia   Acquired hypothyroidism   Psychiatric disorder   Primary hypertension   Gastroparesis   Hematemesis   Hypokalemia   Melena      Admitting Diagnosis: Abdominal pain [R10.9]  Age/Sex: 64 y.o. female    Network Utilization Review Department  ATTENTION: Please call with any questions or concerns to [phone] and carefully listen to the prompts so that you are directed to the right person. All voicemails are confidential.   For Discharge needs, contact Care Management DC Support Team at [phone] opt. [phone]  Send all requests for admission clinical reviews, approved or denied determinations and any other requests to dedicated fax number below belonging to the campus where the patient is receiving treatment. List of dedicated fax numbers for the Facilities:  FACILITY NAME UR FAX NUMBER   ADMISSION DENIALS (Administrative/Medical Necessity) [phone]   DISCHARGE SUPPORT TEAM (NETWORK) [phone]   PARENT CHILD HEALTH (Maternity/NICU/Pediatrics) [phone]   [facility] [phone]   [facility] [phone]   [facility] [phone]   [facility]  [phone]   [facility] [phone]   [facility] [phone]   [facility] [phone]   [facility] [phone]   [facility] [phone]   [facility] [phone]   [facility] [phone]   [facility] [phone]

## 2025-03-21 NOTE — ASSESSMENT & PLAN NOTE
64-year old with a past medical history of severe gastroparesis, GERD, hiatal hernia s/p cTIF in 2021, previous rosas's esophagus who presented to the ED with hematemesis and melena. Patient reported coffee-ground emesis started on Tuesday morning and the last episode was Wednesday. One episode of melena on Thursday. Also reported associated nausea, lightheadedness, and epigastric discomfort. Denied NSAIDs use and alcohol use. Patient is never a tobacco smoker. Denied fever, chills, recent travel history, and sick contacts. Endorsed using Benadryl for her allergy in the past two weeks. No further episodes of hematemesis and melena since arrival. Nausea and epigastric discomfort have improved.     EGD on 2/28/25 revealed Grade D esophagitis and a 4 cm hiatal hernia. Area of previous TIF observed with moderate slack passing the scope.     Her most recent colonoscopy was on 9/2020 withh 5 mm transverse colon polyp (Bx no villous atrophy, no intraepithelial lymphocytosis and no crypt hyperplasia to suggest malabsorptive enteropathy and no malignancy).     On arrival, patient was hypotensive with BP 80s/50s and was given 1.5L of bolus IVF.   Labs notable for WBC 3.08, Hgb 10.6 (baseline 12), and platelet wnl. Hgb dropped to 8.6 this morning. GI was consulted for hematemesis and melena.     Etiology: PUD vs esophagitis vs erosive gastritis vs varices vs Dieulafoy vs Eloisa-Navas vs malignancy vs angiodysplasia    Plan:  - Currently NPO, will plan for EGD today  - Continue Protonix 40 mg BID, Reglan 5mg po TID, and Carafate 1 g TID  - Continue to monitor Hgb, transfuse if < 7.0    - Patient may benefit from outpatient surgery eval for hiatal hernia s/p cTIF

## 2025-03-21 NOTE — ASSESSMENT & PLAN NOTE
Patient reports 2 episodes of hematemesis that resolved through 3/19, patient continues to report melena morning of 3/21  Wonder if due to Eloisa-Navas tears given multiple vomiting episodes.  Positive chronic NSAID use  History of esophagitis; status post recent EGD 2/28/2025 revealing grade D esophagitis, hiatal hernia  With ABLA s/p repeat am cbc. Wonder if element of hemodilution, will repeat H/H at 12 noon  Status post CT abdomen/pelvis-shows likely enteritis and esophagitis  Placed on IV PPI twice daily  Continue sucralfate  Hold Celebrex  Keep n.p.o.  Hemoglobin dropped to 8.6, baseline between 10 -12, continue to trend hemoglobin  Consulted GI-to undergo EGD today, continue with PPI, Carafate, Reglan

## 2025-03-21 NOTE — ASSESSMENT & PLAN NOTE
Admission in Dec 2024 for recurrent aspiration and imaging at that time included CT CAP on 12/23/24 demonstrating narrowing of the distal esophagus just prox to the GEJ and fluid throughout the esophagus.     Had manometry done in 2/2025 which revealed ineffective esophageal motility.  Out of the 11 completed swallows, patient with 1 weakened and 6 failed swallows. Median IRP 15 mmHg and mean  mmHg.s.cm. 4.1 cm hiatal hernia appreciated.     Plan as above, continue to follow up outpatient.

## 2025-03-21 NOTE — PLAN OF CARE
Problem: PAIN - ADULT  Goal: Verbalizes/displays adequate comfort level or baseline comfort level  Description: Interventions:- Encourage patient to monitor pain and request assistance- Assess pain using appropriate pain scale- Administer analgesics based on type and severity of pain and evaluate response- Implement non-pharmacological measures as appropriate and evaluate response- Consider cultural and social influences on pain and pain management- Notify physician/advanced practitioner if interventions unsuccessful or patient reports new pain  Outcome: Progressing     Problem: INFECTION - ADULT  Goal: Absence or prevention of progression during hospitalization  Description: INTERVENTIONS:- Assess and monitor for signs and symptoms of infection- Monitor lab/diagnostic results- Monitor all insertion sites, i.e. indwelling lines, tubes, and drains- Monitor endotracheal if appropriate and nasal secretions for changes in amount and color- [place] appropriate cooling/warming therapies per order- Administer medications as ordered- Instruct and encourage patient and family to use good hand hygiene technique- Identify and instruct in appropriate isolation precautions for identified infection/condition  Outcome: Progressing     Problem: SAFETY ADULT  Goal: Patient will remain free of falls  Description: INTERVENTIONS:- Educate patient/family on patient safety including physical limitations- Instruct patient to call for assistance with activity - Consult OT/PT to assist with strengthening/mobility - Keep Call bell within reach- Keep bed low and locked with side rails adjusted as appropriate- Keep care items and personal belongings within reach- Initiate and maintain comfort rounds- Make Fall Risk Sign visible to staff- Offer Toileting every 2 Hours, in advance of need- Initiate/Maintain alarm- Obtain necessary fall risk management equipment: - Apply yellow socks and bracelet for high fall risk patients- Consider moving  patient to room near nurses station  Outcome: Progressing  Goal: Maintains/Returns to pre admission functional level  Description: INTERVENTIONS:- Perform AM-PAC 6 Click Basic Mobility/ Daily Activity assessment daily.- Set and communicate daily mobility goal to care team and patient/family/caregiver. - Collaborate with rehabilitation services on mobility goals if consulted- Perform Range of Motion 3 times a day.- Reposition patient every 2 hours.- Dangle patient 3 times a day- Stand patient 3 times a day- Ambulate patient 3 times a day- Out of bed to chair 3 times a day - Out of bed for meals 3 times a day- Out of bed for toileting- Record patient progress and toleration of activity level   Outcome: Progressing

## 2025-03-21 NOTE — H&P
H&P - Hospitalist   Name: Irene Plascencia 64 y.o. female I MRN: [id number]  Unit/Bed#: ED 01 I Date of Admission: 3/20/2025   Date of Service: 3/20/2025 I Hospital Day: 0     Assessment & Plan  Hematemesis  Patient also reporting of melena.  Wonder if due to Eloisa-Navas tears given multiple vomiting episodes.  Positive chronic NSAID use  History of esophagitis; status post recent EGD 2/28/2025 revealing grade D esophagitis, hiatal hernia  With ABLA s/p repeat am cbc. Wonder if element of hemodilution, will repeat H/H at 12 noon  Status post CT abdomen/pelvis  Will consult GI  Placed on IV PPI twice daily  Continue sucralfate  Hold Celebrex  Keep n.p.o.  Hypokalemia  Secondary to GI losses  Status post repletion with adequate increase post repeat labs  Replete hypomagnesemia  Telemetry monitoring  Continue to monitor  Chronic hyponatremia  Continue IV fluid hydration  Acquired hypothyroidism  Continue levothyroxine  Psychiatric disorder  History of schizoaffective disorder, anxiety  Continue meds  Primary hypertension  Stable  Gastroparesis  Longstanding history  On scheduled Reglan      VTE Pharmacologic Prophylaxis:   Moderate Risk (Score 3-4) - Pharmacological DVT Prophylaxis Contraindicated. Sequential Compression Devices Ordered.  Code Status: Level 1 - Full Code as per patient  Discussion with family:  Discussed with patient.     Anticipated Length of Stay: Patient will be admitted on an inpatient basis with an anticipated length of stay of greater than 2 midnights secondary to hematemesis/melena, hypokalemia.    History of Present Illness   Chief Complaint: Hematemesis, melena 1 day    Irene Plascencia is a 64 y.o. female with a PMH of esophagitis, gastroparesis who presents with complaint of hematemesis, melena since Tuesday.  She reports of multiple vomiting episodes.  She also reports of epigastric abdominal pain.  She reports of recent outpatient upper endoscopy on 2/28/2025.  She denies use of  anticoagulants.  She reports of chronic use of Celebrex but otherwise denies any other additional NSAIDs use. She denies fever, chills,  symptoms.  Upon presentation to the ER, H&H is stable.  Labs revealing severe hypokalemia.    Review of Systems   Gastrointestinal:  Positive for abdominal pain, blood in stool, nausea and vomiting.       Historical Information   Past Medical History:   Diagnosis Date    Anemia     Anxiety     Arthritis     Back pain at L4-L5 level     Scrheiber esophagus     Bulimia     Colon polyp     Disease of thyroid gland     hypothyroid    GERD (gastroesophageal reflux disease)     Hearing loss in left ear     History of transfusion     Hyperlipidemia     Hypertension     Hypothyroidism     Leukopenia     NMS (neuroleptic malignant syndrome) 01/03/2020    Pneumonia     RA (rheumatoid arthritis) (HCC)     in feet    Sciatica     Seizure (HCC)     due to medication mix up 8/2018 only one historically    Skin spots, red     lower right arm - poss from cat- no s/s infection    Synovial cyst of lumbar spine     Vertigo     Wears dentures     full set    Weight gain      Past Surgical History:   Procedure Laterality Date    BONE MARROW BIOPSY      BREAST SURGERY      enlargement with implants    CLOSED REDUCTION DISTAL FEMUR FRACTURE      COLONOSCOPY      COSMETIC SURGERY      DENTAL SURGERY      HIP ARTHROPLASTY Right 01/02/2020    Procedure: REVISION TOTAL HIP ARTHROPLASTY WITH REPAIR OF PARIPROSTHETIC FRACTURE - POSTERIOR APPROACH;  Surgeon: Dilan Pedersen MD;  Location: [facility];  Service: Orthopedics    VT AMPUTATION METATARSAL W/TOE SINGLE Left 04/07/2023    Procedure: AMPUTATION TOE 4th;  Surgeon: Conner Bartlett DPM;  Location:  MAIN OR;  Service: Podiatry    VT ARTHRP ACETBLR/PROX FEM PROSTC AGRFT/ALGRFT Right 12/11/2019    Procedure: ARTHROPLASTY HIP TOTAL ANTERIOR;  Surgeon: Dilan Pedersen MD;  Location:  MAIN OR;  Service: Orthopedics    VT ESOPHAGOGASTRODUODENOSCOPY TRANSORAL  DIAGNOSTIC N/A 05/11/2021    Procedure: ESOPHAGOGASTRODUODENOSCOPY (EGD);  Surgeon: David Cedeño MD;  Location: [facility];  Service: General    IA LAPS RPR PARAESPHGL HRNA INCL FUNDPLSTY W/MESH N/A 05/11/2021    Procedure: REPAIR HERNIA PARAESOPHAGEAL  LAPAROSCOPIC;  Surgeon: David Cedeño MD;  Location: [facility];  Service: General    IA UNLISTED PROCEDURE ESOPHAGUS N/A 05/11/2021    Procedure: FUNDOPLICATION TRANSORAL INCISIONLESS;  Surgeon: Brad Cadet MD;  Location: [facility];  Service: Gastroenterology    RHINOPLASTY      SINUS SURGERY      TOE AMPUTATION Left     2023 4th toe    TRANSORAL INCISIONLESS FUNDOPLICATION (TIF)  05/11/2021     Social History     Tobacco Use    Smoking status: Never     Passive exposure: Past    Smokeless tobacco: Never   Vaping Use    Vaping status: Never Used   Substance and Sexual Activity    Alcohol use: Not Currently    Drug use: Not Currently    Sexual activity: Not Currently     E-Cigarette/Vaping    E-Cigarette Use Never User      E-Cigarette/Vaping Substances    Nicotine No     THC No     CBD No     Flavoring No     Other No     Unknown No      Family history non-contributory  Social History:  Marital Status: Single   Occupation:   Patient Pre-hospital Living Situation: Home  Patient Pre-hospital Level of Mobility: walks  Patient Pre-hospital Diet Restrictions:     Meds/Allergies   I have reviewed home medications using recent Epic encounter.  Prior to Admission medications    Medication Sig Start Date End Date Taking? Authorizing Provider   amLODIPine (NORVASC) 5 mg tablet TAKE 1 TABLET (5 MG TOTAL) BY MOUTH DAILY. 3/5/25   Raheem Cain MD   celecoxib (CeleBREX) 200 mg capsule Take 1 capsule (200 mg total) by mouth daily as needed for mild pain 3/5/25   Raheem Cain MD   levothyroxine 25 mcg tablet Take 1 tablet (25 mcg total) by mouth daily 3/5/25   Raheem Cain MD   loratadine (CLARITIN) 10 mg tablet Take 1 tablet (10 mg total) by mouth daily 11/22/24    Jonny Villa MD   LORazepam (ATIVAN) 2 mg tablet Take 1 tablet (2 mg total) by mouth every 6 (six) hours as needed for anxiety Max 5 times a day 12/16/24   Raheem Cain MD   metoclopramide (REGLAN) 5 mg tablet Take 1 tablet (5 mg total) by mouth 3 (three) times a day before meals 2/17/25   Raheem Cain MD   omeprazole (PriLOSEC) 40 MG capsule Take 1 capsule (40 mg total) by mouth 2 (two) times a day 12/26/24   Jim Erickson DO   ondansetron (ZOFRAN) 4 mg tablet Take 1 tablet (4 mg total) by mouth every 8 (eight) hours as needed for nausea or vomiting 11/21/24   Raheem Cain MD   PARoxetine (PAXIL) 30 mg tablet Take 2 tablets (60 mg total) by mouth in the morning 2/17/25   Raheem Cain MD   QUEtiapine (SEROquel XR) 400 mg 24 hr tablet Take 1 tablet (400 mg total) by mouth daily at bedtime 2/17/25   Raheem Cain MD   sucralfate (CARAFATE) 1 g tablet TAKE 1 TABLET (1 G TOTAL) BY MOUTH 3 (THREE) TIMES A DAY WITH MEALS 9/21/24   Raheem Cain MD     Allergies   Allergen Reactions    Latex Anaphylaxis, Rash and Tongue Swelling     Band aids, adhesives wears normal underwear, can eat bananas  USE PAPER TAPE    Risperdal [Risperidone] Shortness Of Breath     Rapid heart beat, SOB    Zyprexa [Olanzapine] Shortness Of Breath     Rapid heartbeat       Objective :  Temp:  [97.5 °F (36.4 °C)] 97.5 °F (36.4 °C)  HR:  [] 89  BP: (120-171)/(65-96) 120/65  Resp:  [17-20] 17  SpO2:  [95 %-98 %] 95 %  O2 Device: None (Room air)    Physical Exam  Cardiovascular:      Rate and Rhythm: Normal rate and regular rhythm.      Pulses: Normal pulses.      Heart sounds: Normal heart sounds. No murmur heard.  Pulmonary:      Effort: Pulmonary effort is normal. No respiratory distress.      Breath sounds: Normal breath sounds. No wheezing or rales.   Abdominal:      General: Abdomen is flat. Bowel sounds are normal.      Palpations: Abdomen is soft.      Tenderness: There is abdominal tenderness.   Musculoskeletal:          General: Normal range of motion.      Cervical back: Normal range of motion and neck supple.      Right lower leg: No edema.      Left lower leg: No edema.   Skin:     General: Skin is warm and dry.   Neurological:      General: No focal deficit present.      Mental Status: She is alert and oriented to person, place, and time. Mental status is at baseline.          Lines/Drains:            Lab Results: I have reviewed the following results:  Results from last 7 days   Lab Units 03/20/25  1612   WBC Thousand/uL 6.17   HEMOGLOBIN g/dL 10.9*   HEMATOCRIT % 34.1*   PLATELETS Thousands/uL 338   SEGS PCT % 73   LYMPHO PCT % 15   MONO PCT % 9   EOS PCT % 2     Results from last 7 days   Lab Units 03/20/25  1612   SODIUM mmol/L 132*   POTASSIUM mmol/L 2.5*   CHLORIDE mmol/L 92*   CO2 mmol/L 25   BUN mg/dL 7   CREATININE mg/dL 0.62   ANION GAP mmol/L 15*   CALCIUM mg/dL 10.1   ALBUMIN g/dL 4.0   TOTAL BILIRUBIN mg/dL 0.29   ALK PHOS U/L 66   ALT U/L 14   AST U/L 14   GLUCOSE RANDOM mg/dL 115             Lab Results   Component Value Date    HGBA1C 5.0 05/13/2021    HGBA1C 4.5 12/04/2019    HGBA1C 4.9 11/14/2019           Imaging Results Review: I reviewed radiology reports from this admission including: CT abdomen/pelvis.  Other Study Results Review: No additional pertinent studies reviewed.    Administrative Statements   I have spent a total time of 60 minutes in caring for this patient on the day of the visit/encounter including Diagnostic results, Instructions for management, Patient and family education, Impressions, Counseling / Coordination of care, Documenting in the medical record, Reviewing/placing orders in the medical record (including tests, medications, and/or procedures), and Obtaining or reviewing history  .    ** Please Note: This note has been constructed using a voice recognition system. **

## 2025-03-21 NOTE — ANESTHESIA PREPROCEDURE EVALUATION
Procedure:  EGD    Relevant Problems   CARDIO   (+) Hyperlipidemia   (+) Primary hypertension      ENDO   (+) Acquired hypothyroidism      GI/HEPATIC   (+) Gastroesophageal reflux disease with esophagitis, Schreiber's esophagus and gastritis   (+) Hematemesis   (+) Hiatal hernia with GERD without esophagitis      HEMATOLOGY   (+) Iron deficiency anemia   (+) Microcytic anemia   (+) Nutritional anemia   (+) Severe iron deficiency anemia    (+) Vitamin B12 deficiency (dietary) anemia      MUSCULOSKELETAL   (+) Chronic bilateral low back pain without sciatica   (+) Hiatal hernia with GERD without esophagitis      NEURO/PSYCH   (+) Anxiety   (+) Chronic bilateral low back pain without sciatica   (+) Headache   (+) Word finding difficulty      Other   (+) Osteomyelitis, unspecified site, unspecified type (HCC)        Physical Exam    Airway    Mallampati score: III  TM Distance: >3 FB  Neck ROM: full     Dental        Cardiovascular      Pulmonary      Other Findings  post-pubertal.      Anesthesia Plan  ASA Score- 3     Anesthesia Type- IV sedation with anesthesia with ASA Monitors.         Additional Monitors:     Airway Plan:            Plan Factors-Exercise tolerance (METS): <4 METS.    Chart reviewed.    Patient summary reviewed.    Patient is not a current smoker.  Patient did not smoke on day of surgery.            Induction- intravenous.    Postoperative Plan-     Perioperative Resuscitation Plan - Level 1 - Full Code.       Informed Consent- Anesthetic plan and risks discussed with patient.  I personally reviewed this patient with the CRNA. Discussed and agreed on the Anesthesia Plan with the CRNA..      NPO Status:  Vitals Value Taken Time   Date of last liquid 03/21/25 03/21/25 1455   Time of last liquid 1209 03/21/25 1455   Date of last solid 03/20/25 03/21/25 1455   Time of last solid 0900 03/21/25 1455

## 2025-03-21 NOTE — CONSULTS
Consultation - Gastroenterology   Name: Irene Plascencia 64 y.o. female I MRN: [id number]  Unit/Bed#: ED 01 I Date of Admission: 3/20/2025   Date of Service: 3/21/2025 I Hospital Day: 1   Inpatient consult to gastroenterology  Consult performed by: Abdelrahman Padilla DO  Consult ordered by: Shaina Sharif DO        Physician Requesting Evaluation: Raheem Martinez   Reason for Evaluation / Principal Problem: Hematemesis and melena     Assessment & Plan  Hematemesis  64-year old with a past medical history of severe gastroparesis, GERD, hiatal hernia s/p cTIF in 2021, previous rosas's esophagus who presented to the ED with hematemesis and melena. Patient reported coffee-ground emesis started on Tuesday morning and the last episode was Wednesday. One episode of melena on Thursday. Also reported associated nausea, lightheadedness, and epigastric discomfort. Denied NSAIDs use and alcohol use. Patient is never a tobacco smoker. Denied fever, chills, recent travel history, and sick contacts. Endorsed using Benadryl for her allergy in the past two weeks. No further episodes of hematemesis and melena since arrival. Nausea and epigastric discomfort have improved.     EGD on 2/28/25 revealed Grade D esophagitis and a 4 cm hiatal hernia. Area of previous TIF observed with moderate slack passing the scope.     Her most recent colonoscopy was on 9/2020 withh 5 mm transverse colon polyp (Bx no villous atrophy, no intraepithelial lymphocytosis and no crypt hyperplasia to suggest malabsorptive enteropathy and no malignancy).     On arrival, patient was hypotensive with BP 80s/50s and was given 1.5L of bolus IVF.   Labs notable for WBC 3.08, Hgb 10.6 (baseline 12), and platelet wnl. Hgb dropped to 8.6 this morning. GI was consulted for hematemesis and melena.     Etiology: PUD vs esophagitis vs erosive gastritis vs varices vs Dieulafoy vs Eloisa-Navas vs malignancy vs angiodysplasia    Plan:  - Currently NPO, will plan  for EGD today  - Continue Protonix 40 mg BID, Reglan 5mg po TID, and Carafate 1 g TID  - Continue to monitor Hgb, transfuse if < 7.0    - Patient may benefit from outpatient surgery eval for hiatal hernia s/p cTIF   Gastroparesis  Admission in Dec 2024 for recurrent aspiration and imaging at that time included CT CAP on 12/23/24 demonstrating narrowing of the distal esophagus just prox to the GEJ and fluid throughout the esophagus.     Had manometry done in 2/2025 which revealed ineffective esophageal motility.  Out of the 11 completed swallows, patient with 1 weakened and 6 failed swallows. Median IRP 15 mmHg and mean  mmHg.s.cm. 4.1 cm hiatal hernia appreciated.     Plan as above, continue to follow up outpatient.    Melena  One episode of melena on Thursday. Plan as above.       History of Present Illness   HPI:  Irene Plascencia is a 64 y.o. female with a past medical history of severe gastroparesis, GERD, hiatal hernia s/p cTIF in 2021, previous rosas's esophagus who presented to the ED with hematemesis and melena. Patient reported coffee-ground emesis started on Tuesday morning and the last episode was Wednesday. One episode of melena on Thursday. Patient also reported associated nausea, lightheadedness, and epigastric discomfort. which prompted her to come to ED for further evaluation.  Patient denied NSAIDs use and alcohol use. Patient is never a tobacco smoker. Positive family history colon cancer maternal grandmother. Denied fever, chills, recent travel history, and sick contacts. Endorsed using Benadryl for her allergy in the past two weeks. No further episodes of hematemesis and melena since arrival. Nausea and epigastric discomfort have improved. Patient had EGD on 2/28/25 which revealed Grade D esophagitis and a 4 cm hiatal hernia. Area of previous TIF observed with moderate slack passing the scope.   Manometry on 2/28/25 showed ineffective esophageal motility. Her most recent colonoscopy  was on 9/2020 withh 5 mm transverse colon polyp (Bx no villous atrophy, no intraepithelial lymphocytosis and no crypt hyperplasia to suggest malabsorptive enteropathy and no malignancy). On arrival, patient was hypotensive with BP 80s/50s and was given 1.5L of bolus IVF.   Labs notable for WBC 3.08, Hgb 10.6 (baseline 12), and platelet wnl. Hgb dropped to 8.6 this morning. GI was consulted for hematemesis and melena.     Review of Systems   Constitutional:  Positive for appetite change. Negative for chills and fever.   HENT:  Negative for ear pain, sore throat and trouble swallowing.    Eyes:  Negative for pain and visual disturbance.   Respiratory:  Negative for cough, chest tightness and shortness of breath.    Cardiovascular:  Negative for chest pain and palpitations.   Gastrointestinal:  Positive for abdominal pain (Epigastric), blood in stool and nausea. Negative for diarrhea and vomiting.   Genitourinary:  Negative for dysuria and hematuria.   Musculoskeletal:  Negative for arthralgias and back pain.   Skin:  Negative for color change and rash.   Neurological:  Positive for light-headedness. Negative for seizures and syncope.   All other systems reviewed and are negative.    Medical History Review: I have reviewed the patient's PMH, PSH, Social History, Family History, Meds, and Allergies     Objective :  Temp:  [97.5 °F (36.4 °C)] 97.5 °F (36.4 °C)  HR:  [] 91  BP: ()/(52-96) 104/64  Resp:  [12-20] 12  SpO2:  [92 %-98 %] 97 %  O2 Device: Nasal cannula    Physical Exam  Vitals and nursing note reviewed.   Constitutional:       General: She is not in acute distress.     Appearance: She is well-developed.   HENT:      Head: Normocephalic and atraumatic.   Eyes:      Conjunctiva/sclera: Conjunctivae normal.   Cardiovascular:      Rate and Rhythm: Normal rate and regular rhythm.      Pulses: Normal pulses.      Heart sounds: Normal heart sounds. No murmur heard.  Pulmonary:      Effort: Pulmonary  effort is normal. No respiratory distress.      Breath sounds: Normal breath sounds.   Abdominal:      General: Bowel sounds are normal.      Palpations: Abdomen is soft.      Tenderness: There is no abdominal tenderness.   Musculoskeletal:         General: No swelling.      Cervical back: Neck supple.      Right lower leg: No edema.      Left lower leg: No edema.   Skin:     General: Skin is warm and dry.      Capillary Refill: Capillary refill takes less than 2 seconds.      Coloration: Skin is pale.      Findings: No lesion or rash.   Neurological:      Mental Status: She is alert and oriented to person, place, and time.   Psychiatric:         Mood and Affect: Mood normal.           Lab Results: I have reviewed the following results:    Imaging Results Review: No pertinent imaging studies reviewed.  Other Study Results Review: Pathology reports reviewed.    Administrative Statements   I have spent a total time of 40 minutes in caring for this patient on the day of the visit/encounter including Diagnostic results, Prognosis, Risks and benefits of tx options, Instructions for management, Patient and family education, Importance of tx compliance, Risk factor reductions, Impressions, Counseling / Coordination of care, Documenting in the medical record, Reviewing/placing orders in the medical record (including tests, medications, and/or procedures), Obtaining or reviewing history  , and Communicating with other healthcare professionals .

## 2025-03-21 NOTE — ANESTHESIA POSTPROCEDURE EVALUATION
Post-Op Assessment Note    CV Status:  Stable  Pain Score: 0    Pain management: adequate       Mental Status:  Alert and awake   Hydration Status:  Euvolemic   PONV Controlled:  Controlled   Airway Patency:  Patent     Post Op Vitals Reviewed: Yes    No anethesia notable event occurred.    Staff: Anesthesiologist           Last Filed PACU Vitals:  Vitals Value Taken Time   Temp 97.7 °F (36.5 °C) 03/21/25 1654   Pulse 78 03/21/25 1659   /64 03/21/25 1659   Resp 16 03/21/25 1659   SpO2 97 % 03/21/25 1659       Modified Katlyn:     Vitals Value Taken Time   Activity 2 03/21/25 1654   Respiration 2 03/21/25 1654   Circulation 1 03/21/25 1654   Consciousness 1 03/21/25 1654   Oxygen Saturation 2 03/21/25 1654     Modified Katlyn Score: 8

## 2025-03-21 NOTE — ASSESSMENT & PLAN NOTE
Patient also reporting of melena.  Wonder if due to Eloisa-Navas tears given multiple vomiting episodes.  Positive chronic NSAID use  History of esophagitis; status post recent EGD 2/28/2025 revealing grade D esophagitis, hiatal hernia  With ABLA s/p repeat am cbc. Wonder if element of hemodilution, will repeat H/H at 12 noon  Status post CT abdomen/pelvis  Will consult GI  Placed on IV PPI twice daily  Continue sucralfate  Hold Celebrex  Keep n.p.o.

## 2025-03-21 NOTE — ASSESSMENT & PLAN NOTE
Continue IV fluid hydration  Sodium 132 on admission, improved to 137  Continue to trend with daily BMP

## 2025-03-21 NOTE — ASSESSMENT & PLAN NOTE
Secondary to GI losses  Status post repletion with adequate increase post repeat labs  Replete hypomagnesemia  Telemetry monitoring  Continue to monitor

## 2025-03-21 NOTE — ASSESSMENT & PLAN NOTE
Patient had soft BP this a.m. and required a bolus of IVF  After bolus patient normotensive and stable  Soft BP likely in setting of medications and n.p.o. status  Continue to monitor blood pressure and trend vitals

## 2025-03-22 ENCOUNTER — APPOINTMENT (INPATIENT)
Dept: RADIOLOGY | Facility: HOSPITAL | Age: 65
DRG: 369 | End: 2025-03-22
Payer: COMMERCIAL

## 2025-03-22 LAB
ALBUMIN SERPL BCG-MCNC: 3.5 G/DL (ref 3.5–5)
ALP SERPL-CCNC: 61 U/L (ref 34–104)
ALT SERPL W P-5'-P-CCNC: 13 U/L (ref 7–52)
ANION GAP SERPL CALCULATED.3IONS-SCNC: 5 MMOL/L (ref 4–13)
AST SERPL W P-5'-P-CCNC: 16 U/L (ref 13–39)
BILIRUB SERPL-MCNC: 0.26 MG/DL (ref 0.2–1)
BUN SERPL-MCNC: 7 MG/DL (ref 5–25)
CALCIUM SERPL-MCNC: 8.4 MG/DL (ref 8.4–10.2)
CHLORIDE SERPL-SCNC: 105 MMOL/L (ref 96–108)
CO2 SERPL-SCNC: 29 MMOL/L (ref 21–32)
CREAT SERPL-MCNC: 0.59 MG/DL (ref 0.6–1.3)
ERYTHROCYTE [DISTWIDTH] IN BLOOD BY AUTOMATED COUNT: 15.3 % (ref 11.6–15.1)
GFR SERPL CREATININE-BSD FRML MDRD: 97 ML/MIN/1.73SQ M
GLUCOSE SERPL-MCNC: 83 MG/DL (ref 65–140)
HCT VFR BLD AUTO: 31 % (ref 34.8–46.1)
HCT VFR BLD AUTO: 31.1 % (ref 34.8–46.1)
HGB BLD-MCNC: 9.1 G/DL (ref 11.5–15.4)
HGB BLD-MCNC: 9.6 G/DL (ref 11.5–15.4)
MCH RBC QN AUTO: 24.3 PG (ref 26.8–34.3)
MCHC RBC AUTO-ENTMCNC: 29.4 G/DL (ref 31.4–37.4)
MCV RBC AUTO: 83 FL (ref 82–98)
PLATELET # BLD AUTO: 252 THOUSANDS/UL (ref 149–390)
PMV BLD AUTO: 9.6 FL (ref 8.9–12.7)
POTASSIUM SERPL-SCNC: 3.5 MMOL/L (ref 3.5–5.3)
PROT SERPL-MCNC: 6 G/DL (ref 6.4–8.4)
RBC # BLD AUTO: 3.74 MILLION/UL (ref 3.81–5.12)
SODIUM SERPL-SCNC: 139 MMOL/L (ref 135–147)
WBC # BLD AUTO: 2.84 THOUSAND/UL (ref 4.31–10.16)

## 2025-03-22 PROCEDURE — 80053 COMPREHEN METABOLIC PANEL: CPT

## 2025-03-22 PROCEDURE — 70450 CT HEAD/BRAIN W/O DYE: CPT

## 2025-03-22 PROCEDURE — 85027 COMPLETE CBC AUTOMATED: CPT

## 2025-03-22 PROCEDURE — 85018 HEMOGLOBIN: CPT | Performed by: INTERNAL MEDICINE

## 2025-03-22 PROCEDURE — 85014 HEMATOCRIT: CPT | Performed by: INTERNAL MEDICINE

## 2025-03-22 PROCEDURE — 99232 SBSQ HOSP IP/OBS MODERATE 35: CPT | Performed by: INTERNAL MEDICINE

## 2025-03-22 RX ORDER — METOCLOPRAMIDE 10 MG/1
10 TABLET ORAL ONCE
Status: COMPLETED | OUTPATIENT
Start: 2025-03-22 | End: 2025-03-22

## 2025-03-22 RX ORDER — MECLIZINE HYDROCHLORIDE 25 MG/1
25 TABLET ORAL EVERY 8 HOURS PRN
Status: DISCONTINUED | OUTPATIENT
Start: 2025-03-22 | End: 2025-03-23 | Stop reason: HOSPADM

## 2025-03-22 RX ORDER — POTASSIUM CHLORIDE 1500 MG/1
40 TABLET, EXTENDED RELEASE ORAL ONCE
Status: COMPLETED | OUTPATIENT
Start: 2025-03-22 | End: 2025-03-22

## 2025-03-22 RX ADMIN — METOCLOPRAMIDE 10 MG: 10 TABLET ORAL at 08:02

## 2025-03-22 RX ADMIN — PAROXETINE HYDROCHLORIDE 60 MG: 20 TABLET, FILM COATED ORAL at 08:01

## 2025-03-22 RX ADMIN — PANTOPRAZOLE SODIUM 40 MG: 40 TABLET, DELAYED RELEASE ORAL at 07:54

## 2025-03-22 RX ADMIN — LEVOTHYROXINE SODIUM 25 MCG: 0.03 TABLET ORAL at 05:17

## 2025-03-22 RX ADMIN — LORATADINE 10 MG: 10 TABLET ORAL at 08:01

## 2025-03-22 RX ADMIN — QUETIAPINE 400 MG: 200 TABLET, FILM COATED, EXTENDED RELEASE ORAL at 21:36

## 2025-03-22 RX ADMIN — METOCLOPRAMIDE 5 MG: 5 INJECTION, SOLUTION INTRAMUSCULAR; INTRAVENOUS at 17:17

## 2025-03-22 RX ADMIN — PANTOPRAZOLE SODIUM 40 MG: 40 TABLET, DELAYED RELEASE ORAL at 17:17

## 2025-03-22 RX ADMIN — MECLIZINE HYDROCHLORIDE 25 MG: 25 TABLET ORAL at 19:55

## 2025-03-22 RX ADMIN — SUCRALFATE 1 G: 1 TABLET ORAL at 11:30

## 2025-03-22 RX ADMIN — MECLIZINE HYDROCHLORIDE 25 MG: 25 TABLET ORAL at 11:30

## 2025-03-22 RX ADMIN — SUCRALFATE 1 G: 1 TABLET ORAL at 17:17

## 2025-03-22 RX ADMIN — SUCRALFATE 1 G: 1 TABLET ORAL at 07:54

## 2025-03-22 RX ADMIN — POTASSIUM CHLORIDE 40 MEQ: 1500 TABLET, EXTENDED RELEASE ORAL at 07:54

## 2025-03-22 NOTE — CASE MANAGEMENT
Case Management Assessment & Discharge Planning Note    Patient name Irene Plascencia  Location [facility] 925/[facility] 925-01 MRN [id number]  : 1960 Date 3/22/2025       Current Admission Date: 3/20/2025  Current Admission Diagnosis:Hematemesis   Patient Active Problem List    Diagnosis Date Noted Date Diagnosed    Problems related to living alone 2025     Lack of social support 2025     Generalized weakness 2025     Ambulatory dysfunction 2025     Leukopenia 2024     Heartburn 2024     Gastroparesis 2024     Elevated vitamin B12 level 2024     Iron deficiency 2024     Acquired absence of other toe(s), unspecified side (HCC) 2024     Osteomyelitis, unspecified site, unspecified type (HCC) 2024     Vitamin B12 deficiency (dietary) anemia 2023     Folate deficiency 2023     Neutropenia (HCC) 2023     Post concussion syndrome 2023     Sepsis without acute organ dysfunction (HCC) 2023     Headache 2023     Nutritional anemia 2023     Stress incontinence 2023     Ulcer of toe, chronic, left, with unspecified severity (HCC) 2023     Thrombocytosis 2022     Abdominal pain 2022     Schreiber's esophagus 2022     Microcytic anemia 2022     Self neglect 2022     Pruritus 2022     Hematemesis 01/10/2022     Hyperlipidemia 2021     Word finding difficulty 2021     Hiatal hernia with GERD without esophagitis 2021     Polyp of colon 2021     Melena 2020     Cellulitis of left upper extremity 2020     Gastroesophageal reflux disease with esophagitis, Schreiber's esophagus and gastritis 2020     Rash 2020     Iron deficiency anemia 2020     Multiple excoriations 2020     Dizziness 2020     Fall 2020     Esophagitis 2020     Problem related to living arrangement 2020     Hypokalemia      NMS  (neuroleptic malignant syndrome) 01/03/2020     Elevated lactic acid level 01/03/2020     Preop exam for internal medicine 11/18/2019     Severe iron deficiency anemia  11/14/2019     Chronic bilateral low back pain without sciatica 11/01/2019     Right hip pain 07/15/2019     Primary hypertension 06/12/2019     Dermal hypersensitivity reaction 11/08/2018     Psychiatric disorder 08/21/2018     Schizoaffective disorder (HCC) 08/16/2018     Serotonin syndrome 08/13/2018     Other fatigue 06/19/2018     Spinal stenosis of lumbar region with neurogenic claudication 06/15/2018     Lumbar spondylosis 06/15/2018     T12 compression fracture (HCC) 06/15/2018     Synovial cyst of lumbar facet joint 06/15/2018     Localized osteoporosis with current pathological fracture with routine healing 05/25/2018     Lumbar radiculopathy 03/19/2018     DDD (degenerative disc disease), lumbar 03/19/2018     Spondylolisthesis at L4-L5 level 03/19/2018     Anxiety 10/31/2016     Acquired hypothyroidism 10/31/2016     Chronic hyponatremia 09/25/2016     Constipation 04/10/2014     Bulimia nervosa in remission 03/19/2014       LOS (days): 2  Geometric Mean LOS (GMLOS) (days): 2.9  Days to GMLOS:1.3     OBJECTIVE:    Risk of Unplanned Readmission Score: 26.63         Current admission status: Inpatient       Preferred Pharmacy:   [facility] #[id number] - [place], [place] - [address]  [address] [address]  Phone: [phone] Fax: [phone]    Primary Care Provider: Raheem Cain MD    [facility]  Secondary Insurance:     ASSESSMENT:  Active Health Care Proxies       María, [facility] Representative - Life Partner   Primary Phone: [phone] (Home)                           Readmission Root Cause  30 Day Readmission: No    Patient Information  Admitted from:: Home  Mental Status: Alert  During Assessment patient was accompanied by: Not accompanied  during assessment  Assessment information provided by:: Patient  Primary Caregiver: Self  Support Systems: Self  County of Residence: [place]  What city do you live in?: [place]  Home entry access options. Select all that apply.: Stairs  Number of steps to enter home.: 3  Type of Current Residence: [facility]  Living Arrangements: Lives Alone  Is patient a ?: No    Activities of Daily Living Prior to Admission  Functional Status: Independent  Completes ADLs independently?: Yes  Ambulates independently?: Yes  Does patient use assisted devices?: No  Does patient currently own DME?: No  Does patient have a history of Outpatient Therapy (PT/OT)?: No  Does the patient have a history of Short-Term Rehab?: Yes ([facility] in [place])  Does patient have a history of HHC?: No  Does patient currently have HHC?: No         Patient Information Continued  Income Source: SSI/SSD  Does patient have prescription coverage?: Yes  Does patient receive dialysis treatments?: No  Does patient have a history of substance abuse?: No  Does patient have a history of Mental Health Diagnosis?: Yes (depression, anxiety)  Is patient receiving treatment for mental health?: Yes  Has patient received inpatient treatment related to mental health in the last 2 years?: No         Means of Transportation  Means of Transport to Appts:: Friends  DISCHARGE DETAILS:    Discharge planning discussed with:: Patient  Freedom of Choice: Yes     CM contacted family/caregiver?: No- see comments (declined)  Were Treatment Team discharge recommendations reviewed with patient/caregiver?: Yes  Did patient/caregiver verbalize understanding of patient care needs?: Yes  Were patient/caregiver advised of the risks associated with not following Treatment Team discharge recommendations?: Yes       Additional Comments: CM student met with patient at bedside to complete open assesssment. Pt lives alone in a ranch home. Pt is dependent at baseline with no DME. Pt plans  to return home at d/c. CM remains available for discharge needs

## 2025-03-22 NOTE — ASSESSMENT & PLAN NOTE
Patient had soft BP this a.m. and required a bolus of IVF  After bolus patient normotensive and stable  Norvasc on hold.  Blood pressures are stable  Vitals as per protocol

## 2025-03-22 NOTE — ASSESSMENT & PLAN NOTE
Patient reports 2 episodes of hematemesis that resolved through 3/19, patient continues to report melena morning of 3/21  Wonder if due to Eloisa-Navas tears given multiple vomiting episodes.  Positive chronic NSAID use  History of esophagitis; status post recent EGD 2/28/2025 revealing grade D esophagitis, hiatal hernia  With ABLA s/p repeat am cbc. Wonder if element of hemodilution, will repeat H/H at 12 noon  Status post CT abdomen/pelvis-shows likely enteritis and esophagitis  Continue sucralfate  Hold Celebrex  Patient underwent EGD on 3/21-which showed moderate esophagitis, medium hiatal hernia.  GI recommended repeat EGD in 8 to 12 weeks to ensure esophagitis healing and outpatient surgical referral for consideration of hiatal hernia repair  Monitor H&H and transfuse to keep hemoglobin greater than 7  Continue on twice PPI

## 2025-03-22 NOTE — UTILIZATION REVIEW
SEE INITIAL REVIEW AT BOTTOM    Continued Stay Review    Date: 3/22                          Current Patient Class: Inpatient  Current Level of Care: Med Surg    HPI:64 y.o. female initially admitted on 3/20     Current Diagnosis: Hematemesis, Hypokalemia, Dizziness    Assessment/Plan: Date: 3/22  Day 3: Has surpassed a 2nd midnight with active treatments and services.  S/p EGD on 3/21 which showed moderate esophagitis, medium hiatal hernia.  Monitor H&H and transfuse to keep Hgb greater than 7. Iv Reglan q8h. Continue PPI bid.   Monitor and replace electrolyte prn.  Daily BMP. Norvasc on hold. Orthostatics vital. Meclizine prn.     Medications:   Scheduled Medications:  [Held by provider] amLODIPine, 5 mg, Oral, Daily  levothyroxine, 25 mcg, Oral, Early Morning  loratadine, 10 mg, Oral, Daily  metoclopramide, 5 mg, Intravenous, Q8H YUMIKO  pantoprazole, 40 mg, Oral, BID AC  PARoxetine, 60 mg, Oral, Daily  QUEtiapine, 400 mg, Oral, HS  sucralfate, 1 g, Oral, TID With Meals      Continuous IV Infusions:   sodium chloride 0.9 % infusion  Rate: 100 mL/hr Dose: 100 mL/hr  Freq: Continuous Route: IV  Indications of Use: IV Hydration  Last Dose: Stopped (03/21/25 1728)  Start: 03/21/25 0130 End: 03/22/25 0916    PRN Meds:  acetaminophen, 650 mg, Oral, Q6H PRN  LORazepam, 2 mg, Oral, Q6H PRN  meclizine, 25 mg, Oral, Q8H PRN 3/22 x1  ondansetron, 4 mg, Intravenous, Q6H PRN  oxyCODONE, 5 mg, Oral, Q4H PRN  oxyCODONE, 2.5 mg, Oral, Q4H PRN      Discharge Plan: TBD    Vital Signs (last 3 days)       Date/Time Temp Pulse Resp BP MAP (mmHg) SpO2 O2 Device Patient Position - Orthostatic VS [place] Coma Scale Score Pain    03/22/25 09:23:08 98.9 °F (37.2 °C) 94 18 113/73 86 99 % -- Lying - Orthostatic VS -- --    03/22/25 09:21:10 98.9 °F (37.2 °C) 105 18 113/72 86 100 % -- Standing - Orthostatic VS -- --    03/22/25 09:19:39 98.9 °F (37.2 °C) 95 18 114/68 83 100 % -- Sitting - Orthostatic VS -- --    03/22/25 0900 -- -- -- -- -- --  -- -- 15 No Pain    03/22/25 08:10:19 -- -- -- 140/85 103 -- -- -- -- --    03/21/25 22:40:58 98 °F (36.7 °C) 87 17 110/66 81 93 % -- -- -- --    03/21/25 2100 -- -- -- -- -- -- None (Room air) -- 15 No Pain    03/21/25 1709 -- 88 16 123/68 -- 97 % None (Room air) -- -- --    03/21/25 1704 -- 86 18 105/61 -- 96 % None (Room air) -- -- --    03/21/25 1659 -- 78 16 110/64 -- 97 % -- -- -- --    03/21/25 1654 97.7 °F (36.5 °C) 79 16 85/47 -- 98 % None (Room air) -- -- --    03/21/25 1453 98.6 °F (37 °C) 97 20 121/66 -- 97 % None (Room air) -- -- --    03/21/25 12:15:24 98.3 °F (36.8 °C) 91 18 103/65 78 98 % -- -- 15 No Pain    03/21/25 1214 -- -- -- -- -- -- -- -- -- No Pain    03/21/25 1115 -- 83 18 104/58 75 97 % Nasal cannula Lying -- No Pain    03/21/25 1108 -- 83 22 104/58 75 97 % -- -- -- --    03/21/25 1000 -- 85 18 103/54 72 96 % -- -- -- --    03/21/25 0915 -- 91 12 104/64 78 97 % -- -- -- --    03/21/25 0815 -- 85 16 102/63 76 97 % -- -- -- --    03/21/25 0800 -- 75 17 87/54 67 97 % -- -- -- --    03/21/25 0745 -- 79 -- 86/57 67 92 % -- -- -- --    03/21/25 0725 -- 83 -- 84/52  64 94 % -- -- -- --    03/21/25 0720 -- -- -- -- -- -- -- -- 15 --    03/21/25 0610 -- 91 -- 92/55 67 94 % Nasal cannula Lying -- --    03/20/25 2257 -- 89 17 120/65 84 95 % None (Room air) Lying -- --    03/20/25 1607 97.5 °F (36.4 °C) 101 20 171/96 -- 98 % None (Room air) Sitting -- --          Weight (last 2 days)       Date/Time Weight    03/21/25 1225 79.5 (175.27)    03/20/25 1607 79.4 (175)            Pertinent Labs/Diagnostic Results:   Radiology:  CT abdomen pelvis with contrast   Final Interpretation by Orlando De Anda MD (03/20 2102)      Fluid-filled mildly dilated colon and distal small bowel consistent with a diarrheal GI illness versus mild enterocolitis.      Prominent distal esophageal wall thickening consistent with known esophagitis. Similar to priors   No evidence of perforation.      Unremarkable appearance  of the pancreas noting slightly elevated lipase on today's labs.      The study was marked in EPIC for immediate notification.         Workstation performed: [id number]         CT head wo contrast    (Results Pending)     Cardiology:  ECG 12 lead   Final Result by Anand Michelle MD (03/20 2111)   Sinus tachycardia   Possible Left atrial enlargement   Left ventricular hypertrophy with repolarization abnormality   Abnormal ECG   When compared with ECG of 12-Jan-2025 04:40,   Minimal criteria for Anterior infarct are no longer Present   Confirmed by Anand Michelle ([id number]) on 3/20/2025 9:11:18 PM        GI:  EGD   Final Result by Elizabeth Ch MD (03/21 6289)         RECOMMENDATION:   Return to medical surgical floors, okay to resume diet   Continue on twice daily PPI   Will require repeat EGD in 8 to 12 weeks to ensure esophagitis healing   Recommend outpatient surgery referral for consideration of hiatal hernia    repair   No further inpatient GI interventions warranted or planned.  GI will sign    off.                        Elizabeth Ch MD               Results from last 7 days   Lab Units 03/22/25  0610 03/21/25  0502 03/20/25  1612   WBC Thousand/uL 2.84* 3.08* 6.17   HEMOGLOBIN g/dL 9.1* 8.6* 10.9*   HEMATOCRIT % 31.0* 28.2* 34.1*   PLATELETS Thousands/uL 252 244 338   TOTAL NEUT ABS Thousands/µL  --   --  4.50         Results from last 7 days   Lab Units 03/22/25  0610 03/21/25  0502 03/21/25  0047 03/20/25  1612   SODIUM mmol/L 139 137  --  132*   POTASSIUM mmol/L 3.5 3.5 3.4* 2.5*   CHLORIDE mmol/L 105 104  --  92*   CO2 mmol/L 29 26  --  25   ANION GAP mmol/L 5 7  --  15*   BUN mg/dL 7 5  --  7   CREATININE mg/dL 0.59* 0.52*  --  0.62   EGFR ml/min/1.73sq m 97 101  --  95   CALCIUM mg/dL 8.4 8.3*  --  10.1   MAGNESIUM mg/dL  --  2.1 1.3*  --      Results from last 7 days   Lab Units 03/22/25  0610 03/20/25  1612   AST U/L 16 14   ALT U/L 13 14   ALK PHOS U/L 61 66   TOTAL PROTEIN g/dL 6.0* 6.8   ALBUMIN g/dL  3.5 4.0   TOTAL BILIRUBIN mg/dL 0.26 0.29         Results from last 7 days   Lab Units 03/22/25  0610 03/21/25  0502 03/20/25  1612   GLUCOSE RANDOM mg/dL 83 98 115       Results from last 7 days   Lab Units 03/20/25  1612   LIPASE u/L 109*       Network Utilization Review Department  ATTENTION: Please call with any questions or concerns to [phone] and carefully listen to the prompts so that you are directed to the right person. All voicemails are confidential.   For Discharge needs, contact Care Management DC Support Team at [phone] opt. 2  Send all requests for admission clinical reviews, approved or denied determinations and any other requests to dedicated fax number below belonging to the campus where the patient is receiving treatment. List of dedicated fax numbers for the Facilities:  FACILITY NAME UR FAX NUMBER   ADMISSION DENIALS (Administrative/Medical Necessity) [phone]   DISCHARGE SUPPORT TEAM (NETWORK) [phone]   PARENT CHILD HEALTH (Maternity/NICU/Pediatrics) [phone]   [facility] [phone]   [facility] [phone]   [facility] [phone]   [facility] [phone]   [facility] [phone]   [facility] [phone]   [facility] [phone]   [facility] [phone]   [facility] [phone]   [facility] [phone]   [facility] [phone]   [facility] [phone]

## 2025-03-22 NOTE — PLAN OF CARE
Problem: DISCHARGE PLANNING  Goal: Discharge to home or other facility with appropriate resources  Description: INTERVENTIONS:- Identify barriers to discharge w/patient and caregiver- Arrange for needed discharge resources and transportation as appropriate- Identify discharge learning needs (meds, wound care, etc.)- Arrange for interpretive services to assist at discharge as needed- Refer to Case Management Department for coordinating discharge planning if the patient needs post-hospital services based on physician/advanced practitioner order or complex needs related to functional status, cognitive ability, or social support system  Outcome: Progressing     Problem: Knowledge Deficit  Goal: Patient/family/caregiver demonstrates understanding of disease process, treatment plan, medications, and discharge instructions  Description: Complete learning assessment and assess knowledge base.Interventions:- Provide teaching at level of understanding- Provide teaching via preferred learning methods  Outcome: Progressing

## 2025-03-22 NOTE — PLAN OF CARE
Problem: PAIN - ADULT  Goal: Verbalizes/displays adequate comfort level or baseline comfort level  Description: Interventions:- Encourage patient to monitor pain and request assistance- Assess pain using appropriate pain scale- Administer analgesics based on type and severity of pain and evaluate response- Implement non-pharmacological measures as appropriate and evaluate response- Consider cultural and social influences on pain and pain management- Notify physician/advanced practitioner if interventions unsuccessful or patient reports new pain  Outcome: Progressing     Problem: INFECTION - ADULT  Goal: Absence or prevention of progression during hospitalization  Description: INTERVENTIONS:- Assess and monitor for signs and symptoms of infection- Monitor lab/diagnostic results- Monitor all insertion sites, i.e. indwelling lines, tubes, and drains- Monitor endotracheal if appropriate and nasal secretions for changes in amount and color- [place] appropriate cooling/warming therapies per order- Administer medications as ordered- Instruct and encourage patient and family to use good hand hygiene technique- Identify and instruct in appropriate isolation precautions for identified infection/condition  Outcome: Progressing  Goal: Absence of fever/infection during neutropenic period  Description: INTERVENTIONS:- Monitor WBC  Outcome: Progressing     Problem: SAFETY ADULT  Goal: Patient will remain free of falls  Description: INTERVENTIONS:- Educate patient/family on patient safety including physical limitations- Instruct patient to call for assistance with activity - Consult OT/PT to assist with strengthening/mobility - Keep Call bell within reach- Keep bed low and locked with side rails adjusted as appropriate- Keep care items and personal belongings within reach- Initiate and maintain comfort rounds- Make Fall Risk Sign visible to staff- Offer Toileting every 2 Hours, in advance of need- Initiate/Maintain alarm-  Obtain necessary fall risk management equipment: - Apply yellow socks and bracelet for high fall risk patients- Consider moving patient to room near nurses station  Outcome: Progressing

## 2025-03-22 NOTE — ASSESSMENT & PLAN NOTE
Secondary to GI losses  Potassium 2.5 while in ED, was given repletion of magnesium and potassium while in ED  Potassium 3.5  Monitor and replace electrolytes as needed

## 2025-03-22 NOTE — PROGRESS NOTES
Progress Note - Hospitalist   Name: Irene Plascencia 64 y.o. female I MRN: [id number]  [facility] I Date of Admission: 3/20/2025   Date of Service: 3/22/2025 I Hospital Day: 2    Assessment & Plan  Hematemesis  Patient reports 2 episodes of hematemesis that resolved through 3/19, patient continues to report melena morning of 3/21  Wonder if due to Eloisa-Navas tears given multiple vomiting episodes.  Positive chronic NSAID use  History of esophagitis; status post recent EGD 2/28/2025 revealing grade D esophagitis, hiatal hernia  With ABLA s/p repeat am cbc. Wonder if element of hemodilution, will repeat H/H at 12 noon  Status post CT abdomen/pelvis-shows likely enteritis and esophagitis  Continue sucralfate  Hold Celebrex  Patient underwent EGD on 3/21-which showed moderate esophagitis, medium hiatal hernia.  GI recommended repeat EGD in 8 to 12 weeks to ensure esophagitis healing and outpatient surgical referral for consideration of hiatal hernia repair  Monitor H&H and transfuse to keep hemoglobin greater than 7  Continue on twice PPI  Hypokalemia  Secondary to GI losses  Potassium 2.5 while in ED, was given repletion of magnesium and potassium while in ED  Potassium 3.5  Monitor and replace electrolytes as needed  Chronic hyponatremia  Continue IV fluid hydration  Sodium 132 on admission, improved to 137  Continue to trend with daily BMP  Acquired hypothyroidism  Continue levothyroxine  Psychiatric disorder  History of schizoaffective disorder, anxiety  Continue meds  Primary hypertension  Patient had soft BP this a.m. and required a bolus of IVF  After bolus patient normotensive and stable  Norvasc on hold.  Blood pressures are stable  Vitals as per protocol  Gastroparesis  Longstanding history  On scheduled Reglan, will continue  And GI following    Melena  Patient had numerous reports of melena over the last week  GI on board and underwent EGD  See above  Dizziness  Patient is complaining of  "dizziness for past 1 week.  Likely BPPV  Will obtain orthostatic vitals  CT head for completion  Meclizine as needed    Labs & Imaging: Results Review Statement: No pertinent imaging studies reviewed.    VTE Prophylaxis: in place.    Code Status:   Level 1 - Full Code    Patient Centered Rounds: I have performed bedside rounds with nursing staff today.    Mobility:   Basic Mobility Inpatient Raw Score: 23  JH-HLM Goal: 7: Walk 25 feet or more  JH-HLM Achieved: 7: Walk 25 feet or more  JH-HLM Goal achieved. Continue to encourage appropriate mobility.    Discussions with Specialists or Other Care Team Provider: RN    Education and Discussions with Family / Patient: Attempted to call [facility]-no response    Total Time Spent on Date of Encounter in care of patient: 35 mins. This time was spent on one or more of the following: performing physical exam; counseling and coordination of care; obtaining or reviewing history; documenting in the medical record; reviewing/ordering tests, medications or procedures; communicating with other healthcare professionals and discussing with patient's family/caregivers.    Current Length of Stay: 2 day(s)    Current Patient Status: Inpatient   Certification Statement: The patient will continue to require additional inpatient hospital stay due to see my assessment and plan.     Subjective:   Patient is seen and examined at bedside.  Complains of feeling dizzy\" room spinning sensation\" for 1 week.  Denies any other complaints.  Afebrile  All other ROS are negative.    Objective:    Vitals: Blood pressure 140/85, pulse 87, temperature 98 °F (36.7 °C), resp. rate 17, height 5' 2\" (1.575 m), weight 79.5 kg (175 lb 4.3 oz), SpO2 93%, not currently breastfeeding.,Body mass index is 32.06 kg/m².  SPO2 RA Rest      Flowsheet Row ED to Hosp-Admission (Current) from 3/20/2025 in [facility]   SpO2 93 %   SpO2 Activity At Rest   O2 Device None (Room air)   O2 Flow Rate --      "     I&O:   Intake/Output Summary (Last 24 hours) at 3/22/2025 0914  Last data filed at 3/21/2025 1649  Gross per 24 hour   Intake 200 ml   Output --   Net 200 ml       Physical Exam:    General- Alert, sitting in chair. Not in any acute distress.  Neck- Supple, No JVD  CVS- regular, S1 and S2 normal  Chest- Bilateral Air entry, decreased at bases  Abdomen- soft, nontender, not distended, no guarding or rigidity, BS+  Extremities-  No pedal edema, No calf tenderness                         Normal ROM in all extremities.  CNS-   Alert, awake and orientedx3. No focal deficits present.    Invasive Devices       Peripheral Intravenous Line  Duration             Peripheral IV 03/20/25 Left;Ventral (anterior) Forearm 1 day    Peripheral IV 03/20/25 Right;Ventral (anterior) Hand 1 day                          Social History  reviewed  Family History   Problem Relation Age of Onset    Uterine cancer Mother     Esophageal cancer Father     Colon cancer Maternal Grandmother     reviewed    Meds:  Current Facility-Administered Medications   Medication Dose Route Frequency Provider Last Rate Last Admin    acetaminophen (TYLENOL) tablet 650 mg  650 mg Oral Q6H PRN Shaina Sharif DO        [Held by provider] amLODIPine (NORVASC) tablet 5 mg  5 mg Oral Daily Shaina Sharif DO        levothyroxine tablet 25 mcg  25 mcg Oral Early Morning Shaina Sharif DO   25 mcg at 03/22/25 0517    loratadine (CLARITIN) tablet 10 mg  10 mg Oral Daily Shaina Sharif DO   10 mg at 03/22/25 0801    LORazepam (ATIVAN) tablet 2 mg  2 mg Oral Q6H PRN Shaina Sharif DO        meclizine (ANTIVERT) tablet 25 mg  25 mg Oral Q8H PRN Darin Disla MD        metoclopramide (REGLAN) injection 5 mg  5 mg Intravenous Q8H YUMIKO Shaina Sharif DO   5 mg at 03/21/25 1726    ondansetron (ZOFRAN) injection 4 mg  4 mg Intravenous Q6H PRN Shaina Sharif DO        oxyCODONE (ROXICODONE) IR tablet 5  mg  5 mg Oral Q4H PRN Shainarobert Sharif DO        oxyCODONE (ROXICODONE) split tablet 2.5 mg  2.5 mg Oral Q4H PRN Shainarobert Sharif DO        pantoprazole (PROTONIX) EC tablet 40 mg  40 mg Oral BID AC Mikaela Naranjo, DO   40 mg at 03/22/25 0754    PARoxetine (PAXIL) tablet 60 mg  60 mg Oral Daily Shainaalma rosa Sharif, DO   60 mg at 03/22/25 0801    QUEtiapine (SEROquel XR) 24 hr tablet 400 mg  400 mg Oral HS Shainaalma rosa Sharif, DO   400 mg at 03/21/25 2101    sodium chloride 0.9 % infusion  100 mL/hr Intravenous Continuous Shainaalma rosa Sharif DO   Stopped at 03/21/25 1728    sucralfate (CARAFATE) tablet 1 g  1 g Oral TID With Meals Shainaalma rosa Sharif DO   1 g at 03/22/25 0754        Medications Prior to Admission:     amLODIPine (NORVASC) 5 mg tablet    celecoxib (CeleBREX) 200 mg capsule    levothyroxine 25 mcg tablet    LORazepam (ATIVAN) 2 mg tablet    metoclopramide (REGLAN) 5 mg tablet    PARoxetine (PAXIL) 30 mg tablet    sucralfate (CARAFATE) 1 g tablet    loratadine (CLARITIN) 10 mg tablet    omeprazole (PriLOSEC) 40 MG capsule    ondansetron (ZOFRAN) 4 mg tablet    QUEtiapine (SEROquel XR) 400 mg 24 hr tablet    Labs:  Results from last 7 days   Lab Units 03/22/25  0610 03/21/25  0502 03/20/25  1612   WBC Thousand/uL 2.84* 3.08* 6.17   HEMOGLOBIN g/dL 9.1* 8.6* 10.9*   HEMATOCRIT % 31.0* 28.2* 34.1*   PLATELETS Thousands/uL 252 244 338   SEGS PCT %  --   --  73   LYMPHO PCT %  --   --  15   MONO PCT %  --   --  9   EOS PCT %  --   --  2     Results from last 7 days   Lab Units 03/22/25  0610 03/21/25  0502 03/21/25  0047 03/20/25  1612   POTASSIUM mmol/L 3.5 3.5 3.4* 2.5*   CHLORIDE mmol/L 105 104  --  92*   CO2 mmol/L 29 26  --  25   BUN mg/dL 7 5  --  7   CREATININE mg/dL 0.59* 0.52*  --  0.62   CALCIUM mg/dL 8.4 8.3*  --  10.1   ALK PHOS U/L 61  --   --  66   ALT U/L 13  --   --  14   AST U/L 16  --   --  14     Lab Results   Component Value Date    TROPONINI <0.02  10/23/2021    TROPONINI <0.02 10/22/2021    TROPONINI <0.02 10/22/2021    CKMB 4.5 05/20/2021    CKMB 3.4 01/09/2020    CKMB 4.7 01/07/2020    CKTOTAL 215 (H) 05/20/2021    CKTOTAL 306 (H) 01/09/2020    CKTOTAL 1,020 (H) 01/07/2020         Lab Results   Component Value Date    BLOODCX No Growth After 5 Days. 12/23/2024    BLOODCX Gram Positive Cocci (A) 12/23/2024    BLOODCX Staphylococcus aureus (A) 12/23/2024    BLOODCX Staphylococcus coagulase negative (A) 12/23/2024    URINECX 70,000-79,000 cfu/ml Mixed Contaminants X5 09/24/2016    SPUTUMCULTUR 3+ Growth of Yeast species (A) 12/24/2024    SPUTUMCULTUR 2+ Growth of 12/24/2024    SPUTUMCULTUR 2+ Growth of 09/28/2024         Imaging:  Results for orders placed during the hospital encounter of 09/27/24    XR chest 1 view portable    Narrative  CHEST    INDICATION: Shortness of breath, recent pna, hypoxia.    COMPARISON: Several prior chest radiographs, the most recent from 9/16/2024.    TECHNIQUE: XR CHEST PORTABLE    FINDINGS:    Patchy opacity in the left lung, decreased compared to 9/16/2024. No pleural effusions. No evidence of pneumothorax.    Cardiac silhouette not accurately accessed on this projection.    Impression  Patchy opacity in left midlung, likely representing pneumonia, decreased since 5/16/2024. Recommend follow-up PA and lateral chest radiographs 6-8 weeks after completion of antibiotic treatment to ensure resolution and exclude noninfectious airspace  disease.            Workstation performed: [id number]    Results for orders placed during the hospital encounter of 01/18/25    XR chest 2 views    Narrative  XR CHEST PA AND LATERAL    INDICATION: SOB.    COMPARISON: Compared with 11/22/2024    FINDINGS:    Clear lungs. No pneumothorax or pleural effusion.    Normal cardiomediastinal silhouette.    Bones are unremarkable for age.    Normal upper abdomen.    Impression  No acute cardiopulmonary disease.        Workstation performed:  [id number]      Last 24 Hours Medication List:   Current Facility-Administered Medications   Medication Dose Route Frequency Provider Last Rate    acetaminophen  650 mg Oral Q6H PRN Shaina Abigail Sharif, DO      [Held by provider] amLODIPine  5 mg Oral Daily Shaina Abigail Sharif, DO      levothyroxine  25 mcg Oral Early Morning Shaina Abigail Sharif, DO      loratadine  10 mg Oral Daily Shaina Abigail Sharif, DO      LORazepam  2 mg Oral Q6H PRN Shaina Abigail Sharif, DO      meclizine  25 mg Oral Q8H PRN Darin Disla MD      metoclopramide  5 mg Intravenous Q8H YUMIKO Shaina Abigail Sharif, DO      ondansetron  4 mg Intravenous Q6H PRN Shaina Abigail Sharif, DO      oxyCODONE  5 mg Oral Q4H PRN Shaina Abigail Sharif, DO      oxyCODONE  2.5 mg Oral Q4H PRN Shaina Abigail Sharif, DO      pantoprazole  40 mg Oral BID AC Mikaela Naranjo, DO      PARoxetine  60 mg Oral Daily Shaina Abigail Sharif, DO      QUEtiapine  400 mg Oral HS Shaina Abigail Sharif, DO      sodium chloride  100 mL/hr Intravenous Continuous Shaina Sharif DO Stopped (03/21/25 1728)    sucralfate  1 g Oral TID With Meals Shaina Sharif DO          Today, Patient Was Seen By: Darin Disla MD    ** Please Note: Dictation voice to text software may have been used in the creation of this document. **

## 2025-03-22 NOTE — ASSESSMENT & PLAN NOTE
Patient had numerous reports of melena over the last week  GI on board and underwent EGD  See above

## 2025-03-22 NOTE — ED ATTENDING ATTESTATION
3/20/2025  I, Clarence Olivas DO, saw and evaluated the patient. I have discussed the patient with the resident/non-physician practitioner and agree with the resident's/non-physician practitioner's findings, Plan of Care, and MDM as documented in the resident's/non-physician practitioner's note, except where noted. All available labs and Radiology studies were reviewed.  I was present for key portions of any procedure(s) performed by the resident/non-physician practitioner and I was immediately available to provide assistance.       At this point I agree with the current assessment done in the Emergency Department.  I have conducted an independent evaluation of this patient a history and physical is as follows:    64 yof with hematemesis, melena. Hx esophagitis. Endoscopy recently 2/28. Feels weak. Worsening epigastric pain. Plan labs for anemia. admit    ED Course         Critical Care Time  Procedures

## 2025-03-22 NOTE — ASSESSMENT & PLAN NOTE
Patient is complaining of dizziness for past 1 week.  Likely BPPV  Will obtain orthostatic vitals  CT head for completion  Meclizine as needed

## 2025-03-23 VITALS
WEIGHT: 175.27 LBS | HEIGHT: 62 IN | RESPIRATION RATE: 16 BRPM | DIASTOLIC BLOOD PRESSURE: 72 MMHG | HEART RATE: 85 BPM | BODY MASS INDEX: 32.25 KG/M2 | OXYGEN SATURATION: 98 % | TEMPERATURE: 97.5 F | SYSTOLIC BLOOD PRESSURE: 109 MMHG

## 2025-03-23 DIAGNOSIS — K20.90 ESOPHAGITIS: ICD-10-CM

## 2025-03-23 LAB
ANION GAP SERPL CALCULATED.3IONS-SCNC: 6 MMOL/L (ref 4–13)
BASOPHILS # BLD AUTO: 0.02 THOUSANDS/ÂΜL (ref 0–0.1)
BASOPHILS NFR BLD AUTO: 1 % (ref 0–1)
BUN SERPL-MCNC: 7 MG/DL (ref 5–25)
CALCIUM SERPL-MCNC: 8.5 MG/DL (ref 8.4–10.2)
CHLORIDE SERPL-SCNC: 105 MMOL/L (ref 96–108)
CO2 SERPL-SCNC: 28 MMOL/L (ref 21–32)
CREAT SERPL-MCNC: 0.6 MG/DL (ref 0.6–1.3)
EOSINOPHIL # BLD AUTO: 0.14 THOUSAND/ÂΜL (ref 0–0.61)
EOSINOPHIL NFR BLD AUTO: 5 % (ref 0–6)
ERYTHROCYTE [DISTWIDTH] IN BLOOD BY AUTOMATED COUNT: 15.4 % (ref 11.6–15.1)
GFR SERPL CREATININE-BSD FRML MDRD: 96 ML/MIN/1.73SQ M
GLUCOSE SERPL-MCNC: 96 MG/DL (ref 65–140)
HCT VFR BLD AUTO: 28.1 % (ref 34.8–46.1)
HGB BLD-MCNC: 8.8 G/DL (ref 11.5–15.4)
IMM GRANULOCYTES # BLD AUTO: 0 THOUSAND/UL (ref 0–0.2)
IMM GRANULOCYTES NFR BLD AUTO: 0 % (ref 0–2)
LYMPHOCYTES # BLD AUTO: 0.74 THOUSANDS/ÂΜL (ref 0.6–4.47)
LYMPHOCYTES NFR BLD AUTO: 27 % (ref 14–44)
MAGNESIUM SERPL-MCNC: 2 MG/DL (ref 1.9–2.7)
MCH RBC QN AUTO: 25.4 PG (ref 26.8–34.3)
MCHC RBC AUTO-ENTMCNC: 31.3 G/DL (ref 31.4–37.4)
MCV RBC AUTO: 81 FL (ref 82–98)
MONOCYTES # BLD AUTO: 0.36 THOUSAND/ÂΜL (ref 0.17–1.22)
MONOCYTES NFR BLD AUTO: 13 % (ref 4–12)
NEUTROPHILS # BLD AUTO: 1.47 THOUSANDS/ÂΜL (ref 1.85–7.62)
NEUTS SEG NFR BLD AUTO: 54 % (ref 43–75)
NRBC BLD AUTO-RTO: 0 /100 WBCS
PLATELET # BLD AUTO: 231 THOUSANDS/UL (ref 149–390)
PMV BLD AUTO: 9.1 FL (ref 8.9–12.7)
POTASSIUM SERPL-SCNC: 3.9 MMOL/L (ref 3.5–5.3)
RBC # BLD AUTO: 3.47 MILLION/UL (ref 3.81–5.12)
SODIUM SERPL-SCNC: 139 MMOL/L (ref 135–147)
WBC # BLD AUTO: 2.73 THOUSAND/UL (ref 4.31–10.16)

## 2025-03-23 PROCEDURE — 97166 OT EVAL MOD COMPLEX 45 MIN: CPT

## 2025-03-23 PROCEDURE — 97162 PT EVAL MOD COMPLEX 30 MIN: CPT

## 2025-03-23 PROCEDURE — 83735 ASSAY OF MAGNESIUM: CPT | Performed by: INTERNAL MEDICINE

## 2025-03-23 PROCEDURE — 85025 COMPLETE CBC W/AUTO DIFF WBC: CPT | Performed by: INTERNAL MEDICINE

## 2025-03-23 PROCEDURE — 80048 BASIC METABOLIC PNL TOTAL CA: CPT | Performed by: INTERNAL MEDICINE

## 2025-03-23 PROCEDURE — 99239 HOSP IP/OBS DSCHRG MGMT >30: CPT | Performed by: PHYSICIAN ASSISTANT

## 2025-03-23 RX ORDER — MECLIZINE HYDROCHLORIDE 25 MG/1
25 TABLET ORAL EVERY 8 HOURS PRN
Qty: 30 TABLET | Refills: 0 | Status: SHIPPED | OUTPATIENT
Start: 2025-03-23

## 2025-03-23 RX ORDER — PANTOPRAZOLE SODIUM 40 MG/1
40 TABLET, DELAYED RELEASE ORAL
Qty: 60 TABLET | Refills: 0 | Status: SHIPPED | OUTPATIENT
Start: 2025-03-23 | End: 2025-03-24

## 2025-03-23 RX ADMIN — MECLIZINE HYDROCHLORIDE 25 MG: 25 TABLET ORAL at 09:02

## 2025-03-23 RX ADMIN — LORAZEPAM 2 MG: 1 TABLET ORAL at 09:03

## 2025-03-23 RX ADMIN — PANTOPRAZOLE SODIUM 40 MG: 40 TABLET, DELAYED RELEASE ORAL at 05:40

## 2025-03-23 RX ADMIN — LEVOTHYROXINE SODIUM 25 MCG: 0.03 TABLET ORAL at 05:40

## 2025-03-23 RX ADMIN — SUCRALFATE 1 G: 1 TABLET ORAL at 08:48

## 2025-03-23 RX ADMIN — SUCRALFATE 1 G: 1 TABLET ORAL at 12:06

## 2025-03-23 RX ADMIN — METOCLOPRAMIDE 5 MG: 5 INJECTION, SOLUTION INTRAMUSCULAR; INTRAVENOUS at 10:35

## 2025-03-23 RX ADMIN — LORATADINE 10 MG: 10 TABLET ORAL at 08:48

## 2025-03-23 RX ADMIN — PAROXETINE HYDROCHLORIDE 60 MG: 20 TABLET, FILM COATED ORAL at 08:49

## 2025-03-23 NOTE — NURSING NOTE
Reviewed home location and contact information with pt and placed request for [employer] services through [employer]. AVS reviewed with pt and all questions answered. Pt discharged with [employer] services to home. Waiver signed by pt. Taken to [facility] per transport request. Neyda Mesa accompanied pt to ensure proper reception by [employer].

## 2025-03-23 NOTE — PLAN OF CARE
Problem: PHYSICAL THERAPY ADULT  Goal: Performs mobility at highest level of function for planned discharge setting.  See evaluation for individualized goals.  Description: Treatment/Interventions: Gait training, Spoke to nursing, Spoke to case management          See flowsheet documentation for full assessment, interventions and recommendations.  Note: Prognosis: Good  Problem List: Decreased endurance, Decreased mobility  Assessment: Pt is a 64 y.o. female seen for PT evaluation s/p admit to [facility] on 3/20/2025. Pt was admitted with a primary dx of: Hematemesis, gastroparesis, melena.Pt has a past medical history of Anemia, Anxiety, Arthritis, Back pain at L4-L5 level, Schreiber esophagus, Bulimia, Colon polyp, Disease of thyroid gland, GERD (gastroesophageal reflux disease), Hearing loss in left ear, History of transfusion, Hyperlipidemia, Hypertension, Hypothyroidism, Leukopenia, NMS (neuroleptic malignant syndrome) (01/03/2020), Pneumonia, RA (rheumatoid arthritis) ([facility]), Sciatica, Seizure ([facility]), Skin spots, red, Synovial cyst of lumbar spine, Vertigo, Wears dentures, and Weight gain.   PT now consulted for assessment of mobility and d/c needs. Pt with OOB to chair orders. Pts current clinical presentation is Evolving (medium complexity) due to Ongoing medical management for primary dx, Decreased activity tolerance compared to baseline. Prior to admission, pt was Ind for mobility and ADLs, no AD used pta but has RW and cane if needed.Pt states lives in [facility] with 2 FAMILIA, but will be moving to [facility] senior living next month. Upon evaluation, pt currently is requiring supervision for transfers and ambulation , no AD used during session.Pt did require 1 seated rest break 2* fatigue.Pt approaching functional baseline and will continue to benefit from skilled acute PT interventions to maximize functional mobility.  At conclusion of PT session all needs in reach, RN notified of session  findings/recommendations, and pt returned back in recliner chair with phone and call bell within reach. Pt denies any further questions at this time. The patient's AM-PAC Basic Mobility Inpatient Short Form Raw Score is 19. A Raw score of greater than 16 suggests the patient may benefit from discharge to home. Please also refer to the recommendation of the Physical Therapist for safe discharge planning. Recommend Level III upon hospital D/C.        Rehab Resource Intensity Level, PT: III (Minimum Resource Intensity)    See flowsheet documentation for full assessment.

## 2025-03-23 NOTE — PHYSICAL THERAPY NOTE
Physical Therapy Evaluation     Patient's Name: Irene Plascencia    Admitting Diagnosis  Hematemesis [K92.0]  Melena [K92.1]  Hypokalemia [E87.6]  Abdominal pain [R10.9]  Gastroesophageal reflux disease with esophagitis without hemorrhage [K21.00]    Problem List  Patient Active Problem List   Diagnosis    Chronic hyponatremia    Lumbar radiculopathy    DDD (degenerative disc disease), lumbar    Spondylolisthesis at L4-L5 level    Localized osteoporosis with current pathological fracture with routine healing    Spinal stenosis of lumbar region with neurogenic claudication    Lumbar spondylosis    T12 compression fracture (HCC)    Synovial cyst of lumbar facet joint    Anxiety    Bulimia nervosa in remission    Constipation    Acquired hypothyroidism    Other fatigue    Serotonin syndrome    Schizoaffective disorder (HCC)    Psychiatric disorder    Dermal hypersensitivity reaction    Primary hypertension    Right hip pain    Chronic bilateral low back pain without sciatica    Severe iron deficiency anemia     Preop exam for internal medicine    NMS (neuroleptic malignant syndrome)    Elevated lactic acid level    Hypokalemia    Fall    Esophagitis    Problem related to living arrangement    Dizziness    Iron deficiency anemia    Multiple excoriations    Rash    Cellulitis of left upper extremity    Melena    Hiatal hernia with GERD without esophagitis    Polyp of colon    Word finding difficulty    Hyperlipidemia    Hematemesis    Pruritus    Self neglect    Microcytic anemia    Schreiber's esophagus    Thrombocytosis    Abdominal pain    Stress incontinence    Ulcer of toe, chronic, left, with unspecified severity (HCC)    Nutritional anemia    Headache    Sepsis without acute organ dysfunction (HCC)    Post concussion syndrome    Neutropenia (HCC)    Vitamin B12 deficiency (dietary) anemia    Folate deficiency    Acquired absence of other toe(s), unspecified side (HCC)    Osteomyelitis, unspecified site,  unspecified type (HCC)    Elevated vitamin B12 level    Iron deficiency    Heartburn    Gastroparesis    Leukopenia    Gastroesophageal reflux disease with esophagitis, Schreiber's esophagus and gastritis    Problems related to living alone    Lack of social support    Generalized weakness    Ambulatory dysfunction       Past Medical History  Past Medical History:   Diagnosis Date    Anemia     Anxiety     Arthritis     Back pain at L4-L5 level     Schreiber esophagus     Bulimia     Colon polyp     Disease of thyroid gland     hypothyroid    GERD (gastroesophageal reflux disease)     Hearing loss in left ear     History of transfusion     Hyperlipidemia     Hypertension     Hypothyroidism     Leukopenia     NMS (neuroleptic malignant syndrome) 01/03/2020    Pneumonia     RA (rheumatoid arthritis) (HCC)     in feet    Sciatica     Seizure (HCC)     due to medication mix up 8/2018 only one historically    Skin spots, red     lower right arm - poss from cat- no s/s infection    Synovial cyst of lumbar spine     Vertigo     Wears dentures     full set    Weight gain        Past Surgical History  Past Surgical History:   Procedure Laterality Date    BONE MARROW BIOPSY      BREAST SURGERY      enlargement with implants    CLOSED REDUCTION DISTAL FEMUR FRACTURE      COLONOSCOPY      COSMETIC SURGERY      DENTAL SURGERY      HIP ARTHROPLASTY Right 01/02/2020    Procedure: REVISION TOTAL HIP ARTHROPLASTY WITH REPAIR OF PARIPROSTHETIC FRACTURE - POSTERIOR APPROACH;  Surgeon: iDlan Pedersen MD;  [facility]: [facility];  Service: Orthopedics    CA AMPUTATION METATARSAL W/TOE SINGLE Left 04/07/2023    Procedure: AMPUTATION TOE 4th;  Surgeon: Conner Bartlett DPM;  Location:  MAIN OR;  Service: Podiatry    CA ARTHRP ACETBLR/PROX FEM PROSTC AGRFT/ALGRFT Right 12/11/2019    Procedure: ARTHROPLASTY HIP TOTAL ANTERIOR;  Surgeon: Dilan Pedersen MD;  Location:  MAIN OR;  Service: Orthopedics    CA ESOPHAGOGASTRODUODENOSCOPY TRANSORAL  DIAGNOSTIC N/A 05/11/2021    Procedure: ESOPHAGOGASTRODUODENOSCOPY (EGD);  Surgeon: David Cedeño MD;  [facility];  Service: General    NH LAPS RPR PARAESPHGL HRNA INCL FUNDPLSTY W/MESH N/A 05/11/2021    Procedure: REPAIR HERNIA PARAESOPHAGEAL  LAPAROSCOPIC;  Surgeon: David Cedeño MD;  [facility];  Service: General    NH UNLISTED PROCEDURE ESOPHAGUS N/A 05/11/2021    Procedure: FUNDOPLICATION TRANSORAL INCISIONLESS;  Surgeon: Brad Cadet MD;  [facility]: [facility];  Service: Gastroenterology    RHINOPLASTY      SINUS SURGERY      TOE AMPUTATION Left     2023 4th toe    TRANSORAL INCISIONLESS FUNDOPLICATION (TIF)  05/11/2021 03/23/25 0834   PT Last Visit   PT Visit Date 03/23/25   Note Type   Note type Evaluation   Pain Assessment   Pain Assessment Tool 0-10   Pain Score No Pain   Restrictions/Precautions   Weight Bearing Precautions Per Order No   Other Precautions Multiple lines   Home Living   Type of Home House   Home Layout One level;Stairs to enter with rails  (2STE with HR)   Bathroom Shower/Tub Tub/shower unit   Bathroom Toilet Standard   Home Equipment Walker;Cane   Additional Comments 1STH with 2STE   Prior Function   Level of [place] Independent with ADLs;Independent with functional mobility   Lives With Alone   Receives Help From Neighbor   IADLs Independent with meal prep;Independent with medication management;Family/Friend/Other provides transportation   Falls in the last 6 months 0   Vocational Retired   Comments Pt reports being Ind for mobility, no AD used pta. Neighbor assists with transportation, grocery shopping.   General   Family/Caregiver Present No   Cognition   Overall Cognitive Status WFL   Arousal/Participation Responsive   Orientation Level Oriented X4   Following Commands Follows one step commands without difficulty   Comments Pt cooperative during session   Subjective   Subjective Agreeable to mobilize   RLE Assessment   RLE Assessment WFL   LLE  Assessment   LLE Assessment WFL   Bed Mobility   Supine to Sit Unable to assess   Sit to Supine Unable to assess   Additional Comments Pt OOB in chair upon arrival.   Transfers   Sit to Stand 5  Supervision   Stand to Sit 5  Supervision   Additional Comments no AD used - STS X 2 attempts   Ambulation/Elevation   Gait pattern Improper Weight shift   Gait Assistance 5  Supervision   Assistive Device None   Distance 160 ft + 70 ft   Stair Management Assistance 5  Supervision   Additional items Increased time required   Stair Management Technique Nonreciprocal;One rail R   Number of Stairs 2   Ambulation/Elevation Additional Comments Pt ambulates in hallway without AD. Pt states used to wearing shoes , improper weight shift during session, but overall steady gait. Pt c/o mild VANG requiring 1 seated rest break. SPO2 on RA 95%.   Balance   Static Sitting Good   Dynamic Sitting Good   Static Standing Fair +   Dynamic Standing Fair +   Ambulatory Fair   Endurance Deficit   Endurance Deficit Yes   Activity Tolerance   Activity Tolerance Patient limited by fatigue   Medical Staff Made Aware Co-eval with  OT 2* medical complexity   Nurse Made Aware RN cleared pt for therapy   Assessment   Prognosis Good   Problem List Decreased endurance;Decreased mobility   Assessment Pt is a 64 y.o. female seen for PT evaluation s/p admit to [facility] on 3/20/2025. Pt was admitted with a primary dx of: Hematemesis, gastroparesis, melena.Pt has a past medical history of Anemia, Anxiety, Arthritis, Back pain at L4-L5 level, Schreiber esophagus, Bulimia, Colon polyp, Disease of thyroid gland, GERD (gastroesophageal reflux disease), Hearing loss in left ear, History of transfusion, Hyperlipidemia, Hypertension, Hypothyroidism, Leukopenia, NMS (neuroleptic malignant syndrome) (01/03/2020), Pneumonia, RA (rheumatoid arthritis) ([facility]), Sciatica, Seizure ([facility]), Skin spots, red, Synovial cyst of lumbar spine, Vertigo, Wears dentures, and  Weight gain.   PT now consulted for assessment of mobility and d/c needs. Pt with OOB to chair orders. Pts current clinical presentation is Evolving (medium complexity) due to Ongoing medical management for primary dx, Decreased activity tolerance compared to baseline. Prior to admission, pt was Ind for mobility and ADLs, no AD used pta but has RW and cane if needed.Pt states lives in [facility] with 2 FAMILIA, but will be moving to [facility] senior living next month. Upon evaluation, pt currently is requiring supervision for transfers and ambulation , no AD used during session.Pt did require 1 seated rest break 2* fatigue.Pt approaching functional baseline and will continue to benefit from skilled acute PT interventions to maximize functional mobility.  At conclusion of PT session all needs in reach, RN notified of session findings/recommendations, and pt returned back in recliner chair with phone and call bell within reach. Pt denies any further questions at this time. The patient's AM-PAC Basic Mobility Inpatient Short Form Raw Score is 19. A Raw score of greater than 16 suggests the patient may benefit from discharge to home. Please also refer to the recommendation of the Physical Therapist for safe discharge planning. Recommend Level III upon hospital D/C.   Goals   Patient Goals to go home   STG Expiration Date 04/06/25   Short Term Goal #1 STG 1.. Pt will be able to perform functional transfer with Ind in order to improve overall functional mobility and assist in safe d/c. STG 4. Pt will be able to ambulate at least 250 feet with least restrictive device with Sup A in order to improve overall functional mobility and assist in safe d/c. STG 3. Pt will improve sitting/standing static/dynamic balance 1/2 grade in order to improve functional mobility and assist in safe d/c. STG 4 Pt will be able to negotiate at least 4 stairs with least restrictive device with sup A in order to improve overall functional mobility and assist in  safe d/c.   PT Treatment Day 0   Plan   Treatment/Interventions Gait training;Spoke to nursing;Spoke to case management   PT Frequency 1-2x/wk   Discharge Recommendation   Rehab Resource Intensity Level, PT III (Minimum Resource Intensity)   AM-PAC Basic Mobility Inpatient   Turning in Flat Bed Without Bedrails 4   Lying on Back to Sitting on Edge of Flat Bed Without Bedrails 3   Moving Bed to Chair 3   Standing Up From Chair Using Arms 3   Walk in Room 3   Climb 3-5 Stairs With Railing 3   Basic Mobility Inpatient Raw Score 19   Basic Mobility Standardized Score 42.48   [facility] Highest Level Of Mobility   -HLM Goal 6: Walk 10 steps or more   -HLM Achieved 7: Walk 25 feet or more   Modified Pickett Scale   Modified Tiffany Scale 1   End of Consult   Patient Position at End of Consult All needs within reach;Bed/Chair alarm activated;Bedside chair         Isabella Sanches, PT DPT

## 2025-03-23 NOTE — DISCHARGE SUMMARY
Discharge Summary - Hospitalist   Name: Irene Plascencia 64 y.o. female I MRN: [id number]  [facility] Date of Admission: 3/20/2025   Date of Service: 3/23/2025 I Hospital Day: 3     Assessment & Plan  Hematemesis  Patient reports 2 episodes of hematemesis that resolved through 3/19, patient continues to report melena morning of 3/21. Now resolved  History of esophagitis; status post recent EGD 2/28/2025 revealing grade D esophagitis, hiatal hernia  Status post CT abdomen/pelvis-shows likely enteritis and esophagitis  Continue sucralfate  Hold Celebrex  Patient underwent EGD on 3/21-which showed moderate esophagitis, medium hiatal hernia.  GI recommended repeat EGD in 8 to 12 weeks to ensure esophagitis healing and outpatient surgical referral for consideration of hiatal hernia repair  Continue on twice daily PPI  Hypokalemia  Secondary to GI losses  resolved  Chronic hyponatremia  Sodium 132 on admission, improved to 137 with IVF    Acquired hypothyroidism  Continue levothyroxine  Psychiatric disorder  History of schizoaffective disorder, anxiety  Continue meds  Primary hypertension  Patient had soft BP 3/21 and required a bolus of IVF  After bolus patient normotensive and stable  Norvasc on hold.  Blood pressures are stable - restart on discharge  Gastroparesis  Longstanding history  On scheduled Reglan, will continue  And GI following    Melena  Patient had numerous reports of melena over the last week  GI on board and underwent EGD  See above  Dizziness  Patient is complaining of dizziness for past 1 week.  Likely BPPV  Will obtain orthostatic vitals -negative  CT head  -no acute abnormality  Meclizine as needed     Medical Problems       Resolved Problems  Date Reviewed: 1/19/2025   None       Discharging Physician / Practitioner: Sofiya Lofton PA-C  PCP: Raheem Cain MD  Admission Date:   Admission Orders (From admission, onward)       Ordered        03/20/25 2145  INPATIENT ADMISSION  Once                           Discharge Date: 03/23/25    Consultations During Hospital Stay:  GI    Procedures Performed:     CT head  No acute intracranial abnormality.     CT abd/pelvis  Fluid-filled mildly dilated colon and distal small bowel consistent with a diarrheal GI illness versus mild enterocolitis.     Prominent distal esophageal wall thickening consistent with known esophagitis. Similar to priors  No evidence of perforation.     Unremarkable appearance of the pancreas noting slightly elevated lipase on today's labs.    EGD 3/21  The upper third of the esophagus and middle third of the esophagus appeared normal.  Irregular Z-line 34 cm from the incisors  Moderate, generalized grade D esophagitis with mucosal breaks measuring 5 mm or more, continuous between folds, covering 75% or more of the circumference appearing cobblestone, erythematous and fissured in the lower third of the esophagus; performed cold forceps biopsy  Medium hiatal hernia without Geo lesions present - GE junction 34 cm from the incisors, diaphragmatic impression 38 cm from the incisors, confirmed by retroflexion:  Hill classification: Grade III  Area of previous TIF observed with moderate slack passing the scope through.  Multiple fundic gland polyps measuring smaller than 5 mm in the stomach  The stomach and duodenum appeared normal.  The duodenum appeared normal.  Return to medical surgical floors, okay to resume diet  Continue on twice daily PPI  Will require repeat EGD in 8 to 12 weeks to ensure esophagitis healing  Recommend outpatient surgery referral for consideration of hiatal hernia repair  No further inpatient GI interventions warranted or planned.  GI will sign off.      Significant Findings / Test Results:   See above    Incidental Findings:   none     Test Results Pending at Discharge (will require follow up):   See above     Outpatient Tests Requested:  none    Complications:  none    Reason for Admission: coffee ground  "emesis    Hospital Course:   Irene Plascencia is a 64 y.o. female patient who originally presented to the hospital on 3/20/2025 due to coffee-ground emesis and melena.  Patient was seen in consultation by GI.  Patient underwent EGD on 3/21.  Her EGD continues to be consistent with esophagitis.  GI recommending twice daily PPI, repeat EGD in 8 to 12 weeks and outpatient surgery referral to consider repair of hiatal hernia.  Clinically patient improved.  Hemoglobin at discharge 8.8.    Patient also complaining of vertigo.  Likely BPPV.  She will be discharged with as needed meclizine.      Please see above list of diagnoses and related plan for additional information.     Condition at Discharge: good    Discharge Day Visit / Exam:   Subjective: Offers no complaints.  Denies any abdominal pain.  Tolerating full diet.  Vitals: Blood Pressure: 109/72 (03/23/25 0810)  Pulse: 85 (03/23/25 0810)  Temperature: 97.5 °F (36.4 °C) (03/23/25 0810)  Temp Source: Tympanic (03/21/25 1654)  Respirations: 16 (03/23/25 0810)  Height: 5' 2\" (157.5 cm) (03/21/25 1225)  Weight - Scale: 79.5 kg (175 lb 4.3 oz) (03/21/25 1225)  SpO2: 97 % (03/23/25 0810)  Physical Exam  Constitutional:       Appearance: Normal appearance.   Cardiovascular:      Rate and Rhythm: Normal rate and regular rhythm.      Heart sounds: No murmur heard.  Pulmonary:      Effort: Pulmonary effort is normal.      Breath sounds: Normal breath sounds.   Abdominal:      General: Bowel sounds are normal. There is no distension.      Palpations: Abdomen is soft.      Tenderness: There is abdominal tenderness (mild epigastric tenderness to palpation).   Skin:     General: Skin is warm and dry.   Neurological:      General: No focal deficit present.      Mental Status: She is alert and oriented to person, place, and time.   Psychiatric:         Mood and Affect: Mood normal.          Discussion with Family: Patient declined call to .     Discharge " instructions/Information to patient and family:   See after visit summary for information provided to patient and family.      Provisions for Follow-Up Care:  See after visit summary for information related to follow-up care and any pertinent home health orders.      Mobility at time of Discharge:   Basic Mobility Inpatient Raw Score: 23  JH-HLM Goal: 7: Walk 25 feet or more  JH-HLM Achieved: 8: Walk 250 feet ot more  HLM Goal achieved. Continue to encourage appropriate mobility.     Disposition:   Home    Planned Readmission: none    Discharge Medications:  See after visit summary for reconciled discharge medications provided to patient and/or family.      Administrative Statements   Discharge Statement:  I have spent a total time of 45 minutes in caring for this patient on the day of the visit/encounter.     **Please Note: This note may have been constructed using a voice recognition system**

## 2025-03-23 NOTE — PLAN OF CARE
Problem: PAIN - ADULT  Goal: Verbalizes/displays adequate comfort level or baseline comfort level  Description: Interventions:- Encourage patient to monitor pain and request assistance- Assess pain using appropriate pain scale- Administer analgesics based on type and severity of pain and evaluate response- Implement non-pharmacological measures as appropriate and evaluate response- Consider cultural and social influences on pain and pain management- Notify physician/advanced practitioner if interventions unsuccessful or patient reports new pain  Outcome: Progressing

## 2025-03-23 NOTE — OCCUPATIONAL THERAPY NOTE
Occupational Therapy Evaluation     Patient Name: Irene Plascencia  Today's Date: 3/23/2025  Problem List  Principal Problem:    Hematemesis  Active Problems:    Chronic hyponatremia    Acquired hypothyroidism    Psychiatric disorder    Primary hypertension    Hypokalemia    Dizziness    Melena    Gastroparesis    Past Medical History  Past Medical History:   Diagnosis Date    Anemia     Anxiety     Arthritis     Back pain at L4-L5 level     Schreiber esophagus     Bulimia     Colon polyp     Disease of thyroid gland     hypothyroid    GERD (gastroesophageal reflux disease)     Hearing loss in left ear     History of transfusion     Hyperlipidemia     Hypertension     Hypothyroidism     Leukopenia     NMS (neuroleptic malignant syndrome) 01/03/2020    Pneumonia     RA (rheumatoid arthritis) (HCC)     in feet    Sciatica     Seizure (HCC)     due to medication mix up 8/2018 only one historically    Skin spots, red     lower right arm - poss from cat- no s/s infection    Synovial cyst of lumbar spine     Vertigo     Wears dentures     full set    Weight gain      Past Surgical History  Past Surgical History:   Procedure Laterality Date    BONE MARROW BIOPSY      BREAST SURGERY      enlargement with implants    CLOSED REDUCTION DISTAL FEMUR FRACTURE      COLONOSCOPY      COSMETIC SURGERY      DENTAL SURGERY      HIP ARTHROPLASTY Right 01/02/2020    Procedure: REVISION TOTAL HIP ARTHROPLASTY WITH REPAIR OF PARIPROSTHETIC FRACTURE - POSTERIOR APPROACH;  Surgeon: Dilan Pedersen MD;  [facility];  Service: Orthopedics    AL AMPUTATION METATARSAL W/TOE SINGLE Left 04/07/2023    Procedure: AMPUTATION TOE 4th;  Surgeon: Conner Bartlett DPM;  Location:  MAIN OR;  Service: Podiatry    AL ARTHRP ACETBLR/PROX FEM PROSTC AGRFT/ALGRFT Right 12/11/2019    Procedure: ARTHROPLASTY HIP TOTAL ANTERIOR;  Surgeon: Dilan Pedersen MD;  Location:  MAIN OR;  Service: Orthopedics    AL ESOPHAGOGASTRODUODENOSCOPY TRANSORAL  DIAGNOSTIC N/A 05/11/2021    Procedure: ESOPHAGOGASTRODUODENOSCOPY (EGD);  Surgeon: David Cedeño MD;  [facility];  Service: General    OR LAPS RPR PARAESPHGL HRNA INCL FUNDPLSTY W/MESH N/A 05/11/2021    Procedure: REPAIR HERNIA PARAESOPHAGEAL  LAPAROSCOPIC;  Surgeon: David Cedeño MD;  [facility];  Service: General    OR UNLISTED PROCEDURE ESOPHAGUS N/A 05/11/2021    Procedure: FUNDOPLICATION TRANSORAL INCISIONLESS;  Surgeon: Brad Cadet MD;  [facility];  Service: Gastroenterology    RHINOPLASTY      SINUS SURGERY      TOE AMPUTATION Left     2023 4th toe    TRANSORAL INCISIONLESS FUNDOPLICATION (TIF)  05/11/2021     OCCUPATIONAL THERAPY           03/23/25 0835   OT Last Visit   OT Visit Date 03/23/25   Note Type   Note type Evaluation   Pain Assessment   Pain Assessment Tool 0-10   Pain Score No Pain   Restrictions/Precautions   Weight Bearing Precautions Per Order No   Other Precautions Cognitive;Multiple lines   Home Living   Type of Home House  (pt reports ranch)   Home Layout One level   Bathroom Shower/Tub Tub/shower unit  (Cut-out tub shower)   Bathroom Toilet Standard   Bathroom Equipment Grab bars in shower   Bathroom Accessibility Accessible   Home Equipment Walker;Cane   Additional Comments pt reports planning to move to new apartment for seniors with handicap setup /cut out tub, and available call bell   Prior Function   Level of [place] Independent with ADLs;Independent with functional mobility   Lives With Alone   Receives Help From Neighbor   IADLs Independent with meal prep;Independent with medication management;Family/Friend/Other provides transportation   Falls in the last 6 months 0   Vocational Retired   Lifestyle   Autonomy Pt reports I with all ADLs and IADLS PTA . No driving/reports pt neighbor drives her to/from grocery store   Reciprocal Relationships Neighbor   Service to Others retired   Intrinsic Gratification Miniture dolls   Subjective  "  Subjective \" I spend most of my being sick\"   ADL   Where Assessed Chair   Grooming Assistance 6  Modified Independent   UB Bathing Assistance 6  Modified Independent   LB Bathing Assistance 6  Modified Independent   UB Dressing Assistance 6  Modified independent   LB Dressing Assistance 6  Modified independent   LB Dressing Deficit Don/doff R sock;Don/doff L sock   Toileting Assistance  6  Modified independent   Bed Mobility   Supine to Sit Unable to assess   Additional Comments pt presented sitting out of bed to bedside chair upon initiation of therapy   Transfers   Sit to Stand 5  Supervision   Additional items Assist x 1   Stand to Sit 5  Supervision   Additional items Assist x 1   Functional Mobility   Functional Mobility 5  Supervision   Additional Comments   (no addaptive Device)   Additional items Hand hold assistance   Balance   Static Sitting Good   Dynamic Sitting Good   Static Standing Fair +   Dynamic Standing Fair +   Ambulatory Fair   Activity Tolerance   Activity Tolerance Patient limited by fatigue   RUE Assessment   RUE Assessment WFL   LUE Assessment   LUE Assessment WFL   Hand Function   Gross Motor Coordination Functional   Fine Motor Coordination Functional   Sensation   Light Touch No apparent deficits   Proprioception   Proprioception No apparent deficits   Vision-Basic Assessment   Current Vision No visual deficits   Perception   Inattention/Neglect Appears intact   Cognition   Overall Cognitive Status WFL   Arousal/Participation Alert;Responsive;Cooperative   Attention Within functional limits   Orientation Level Oriented X4   Following Commands Follows one step commands without difficulty   Comments pt pleasant and cooperative. Able to recall one of her hobbies with an increased time allowed.   Assessment   Prognosis Good   Assessment Pt is a 64 y.o. female who was admitted to [facility] on 3/20/2025 with Hematemesis gastroparesis, melena and symptoms that include pt positive " for abdominal pain, blood in stool, nausea and vomiting with plan to include pt studies and CT abdomen/pelvis. Pt now seen for Occupational Therapy Evaluation.  Patient noted for a past medical history of Anemia, Anxiety, Arthritis, Back pain at L4-L5 level, Schreiber esophagus, Bulimia, Colon polyp, Disease of thyroid gland, GERD (gastroesophageal reflux disease), Hearing loss in left ear, History of transfusion, Hyperlipidemia, Hypertension, Hypothyroidism, Leukopenia, NMS (neuroleptic malignant syndrome), Pneumonia, RA (rheumatoid arthritis) (HCC), Sciatica, Seizure (HCC), Skin spots, red, Synovial cyst of lumbar spine, Vertigo, Wears dentures, and Weight gain.  At baseline pt was completing ADLs and IADL Independently and receiving assist for driving to and from the grocery store. Pt lives alone in a ranch home with 2 steps to enter, reporting she is planning to move into a senior living apartment next month. Currently pt requires S for overall ADLS and S for functional mobility/transfers. From OT standpoint, the pt recommendation would be home with increased support. At this time pt currently has no further acute OT needs  and plan to D/C pt from OT services. Re-consult if needed.  The patient's raw score on the -PAC Daily Activity Inpatient Short Form is 24. A raw score of greater than or equal to 19 suggests the patient may benefit from discharge to home.   Goals   Patient Goals pt goal was to return home   Plan   Treatment Interventions ADL retraining;Functional transfer training;Endurance training   Discharge Recommendation   Rehab Resource Intensity Level, OT No post-acute rehabilitation needs   AM-PAC Daily Activity Inpatient   Lower Body Dressing 4   Bathing 4   Toileting 4   Upper Body Dressing 4   Grooming 4   Eating 4   Daily Activity Raw Score 24   Daily Activity Standardized Score (Calc for Raw Score >=11) 57.54   AM-PAC Applied Cognition Inpatient   Following a Speech/Presentation 3   Understanding  Ordinary Conversation 4   Taking Medications 4   Remembering Where Things Are Placed or Put Away 4   Remembering List of 4-5 Errands 4   Taking Care of Complicated Tasks 4   Applied Cognition Raw Score 23   Applied Cognition Standardized Score 53.08   Barthel Index   Feeding 10   Bathing 5   Grooming Score 5   Dressing Score 10   Bladder Score 10   Bowels Score 10   Toilet Use Score 10   Transfers (Bed/Chair) Score 15   Mobility (Level Surface) Score 10   Stairs Score 10   Barthel Index Score 95         Kerrie Gómez OTR/L

## 2025-03-23 NOTE — PLAN OF CARE
Problem: PAIN - ADULT  Goal: Verbalizes/displays adequate comfort level or baseline comfort level  Description: Interventions:- Encourage patient to monitor pain and request assistance- Assess pain using appropriate pain scale- Administer analgesics based on type and severity of pain and evaluate response- Implement non-pharmacological measures as appropriate and evaluate response- Consider cultural and social influences on pain and pain management- Notify physician/advanced practitioner if interventions unsuccessful or patient reports new pain  Outcome: Progressing     Problem: INFECTION - ADULT  Goal: Absence or prevention of progression during hospitalization  Description: INTERVENTIONS:- Assess and monitor for signs and symptoms of infection- Monitor lab/diagnostic results- Monitor all insertion sites, i.e. indwelling lines, tubes, and drains- Monitor endotracheal if appropriate and nasal secretions for changes in amount and color- [place] appropriate cooling/warming therapies per order- Administer medications as ordered- Instruct and encourage patient and family to use good hand hygiene technique- Identify and instruct in appropriate isolation precautions for identified infection/condition  Outcome: Progressing     Problem: SAFETY ADULT  Goal: Maintain or return to baseline ADL function  Description: INTERVENTIONS:-  Assess patient's ability to carry out ADLs; assess patient's baseline for ADL function and identify physical deficits which impact ability to perform ADLs (bathing, care of mouth/teeth, toileting, grooming, dressing, etc.)- Assess/evaluate cause of self-care deficits - Assess range of motion- Assess patient's mobility; develop plan if impaired- Assess patient's need for assistive devices and provide as appropriate- Encourage maximum independence but intervene and supervise when necessary- Involve family in performance of ADLs- Assess for home care needs following discharge - Consider OT consult  to assist with ADL evaluation and planning for discharge- Provide patient education as appropriate  Outcome: Progressing     Problem: Knowledge Deficit  Goal: Patient/family/caregiver demonstrates understanding of disease process, treatment plan, medications, and discharge instructions  Description: Complete learning assessment and assess knowledge base.Interventions:- Provide teaching at level of understanding- Provide teaching via preferred learning methods  Outcome: Progressing     Problem: Prexisting or High Potential for Compromised Skin Integrity  Goal: Skin integrity is maintained or improved  Description: INTERVENTIONS:- Identify patients at risk for skin breakdown- Assess and monitor skin integrity- Assess and monitor nutrition and hydration status- Monitor labs - Assess for incontinence - Turn and reposition patient- Assist with mobility/ambulation- Relieve pressure over bony prominences- Avoid friction and shearing- Provide appropriate hygiene as needed including keeping skin clean and dry- Evaluate need for skin moisturizer/barrier cream- Collaborate with interdisciplinary team - Patient/family teaching- Consider wound care consult   Outcome: Progressing     Problem: DISCHARGE PLANNING  Goal: Discharge to home or other facility with appropriate resources  Description: INTERVENTIONS:- Identify barriers to discharge w/patient and caregiver- Arrange for needed discharge resources and transportation as appropriate- Identify discharge learning needs (meds, wound care, etc.)- Arrange for interpretive services to assist at discharge as needed- Refer to Case Management Department for coordinating discharge planning if the patient needs post-hospital services based on physician/advanced practitioner order or complex needs related to functional status, cognitive ability, or social support system  Outcome: Adequate for Discharge

## 2025-03-23 NOTE — ASSESSMENT & PLAN NOTE
Patient had soft BP 3/21 and required a bolus of IVF  After bolus patient normotensive and stable  Norvasc on hold.  Blood pressures are stable - restart on discharge

## 2025-03-23 NOTE — ASSESSMENT & PLAN NOTE
Patient reports 2 episodes of hematemesis that resolved through 3/19, patient continues to report melena morning of 3/21. Now resolved  History of esophagitis; status post recent EGD 2/28/2025 revealing grade D esophagitis, hiatal hernia  Status post CT abdomen/pelvis-shows likely enteritis and esophagitis  Continue sucralfate  Hold Celebrex  Patient underwent EGD on 3/21-which showed moderate esophagitis, medium hiatal hernia.  GI recommended repeat EGD in 8 to 12 weeks to ensure esophagitis healing and outpatient surgical referral for consideration of hiatal hernia repair  Continue on twice daily PPI

## 2025-03-23 NOTE — ASSESSMENT & PLAN NOTE
Patient is complaining of dizziness for past 1 week.  Likely BPPV  Will obtain orthostatic vitals -negative  CT head  -no acute abnormality  Meclizine as needed

## 2025-03-24 ENCOUNTER — TELEPHONE (OUTPATIENT)
Dept: INTERNAL MEDICINE CLINIC | Facility: CLINIC | Age: 65
End: 2025-03-24

## 2025-03-24 ENCOUNTER — TELEPHONE (OUTPATIENT)
Dept: GASTROENTEROLOGY | Facility: CLINIC | Age: 65
End: 2025-03-24

## 2025-03-24 DIAGNOSIS — M54.16 LUMBAR RADICULOPATHY: Primary | ICD-10-CM

## 2025-03-24 DIAGNOSIS — F41.9 ANXIETY: ICD-10-CM

## 2025-03-24 DIAGNOSIS — F07.81 POST CONCUSSION SYNDROME: ICD-10-CM

## 2025-03-24 DIAGNOSIS — I10 HYPERTENSION: ICD-10-CM

## 2025-03-24 RX ORDER — CELECOXIB 200 MG/1
200 CAPSULE ORAL DAILY PRN
Qty: 30 CAPSULE | Refills: 0 | Status: SHIPPED | OUTPATIENT
Start: 2025-03-24

## 2025-03-24 RX ORDER — LORAZEPAM 2 MG/1
2 TABLET ORAL EVERY 6 HOURS PRN
Qty: 180 TABLET | Refills: 1 | Status: SHIPPED | OUTPATIENT
Start: 2025-03-24

## 2025-03-24 RX ORDER — PANTOPRAZOLE SODIUM 40 MG/1
40 TABLET, DELAYED RELEASE ORAL 2 TIMES DAILY
Qty: 180 TABLET | Refills: 3 | Status: SHIPPED | OUTPATIENT
Start: 2025-03-24

## 2025-03-24 NOTE — UTILIZATION REVIEW
NOTIFICATION OF ADMISSION DISCHARGE   This is a Notification of Discharge from [facility]. Please be advised that this patient has been discharge from our facility. Below you will find the admission and discharge date and time including the patient’s disposition.   UTILIZATION REVIEW CONTACT:  Erin Ferrer  Utilization   Network Utilization Review Department  Phone: [phone] x carefully listen to the prompts. All voicemails are confidential.  Email: [email or web address]     ADMISSION INFORMATION  PRESENTATION DATE: 3/20/2025  7:05 PM  OBERVATION ADMISSION DATE: N/A  INPATIENT ADMISSION DATE: 3/20/25  9:45 PM   DISCHARGE DATE:   DISPOSITION:   Network Utilization Review Department  ATTENTION: Please call with any questions or concerns to [phone] and carefully listen to the prompts so that you are directed to the right person. All voicemails are confidential.   For Discharge needs, contact Care Management DC Support Team at [phone] opt. 2  Send all requests for admission clinical reviews, approved or denied determinations and any other requests to dedicated fax number below belonging to the campus where the patient is receiving treatment. List of dedicated fax numbers for the Facilities:  FACILITY NAME UR FAX NUMBER   ADMISSION DENIALS (Administrative/Medical Necessity) [phone]   DISCHARGE SUPPORT TEAM ([facility]) [phone]   PARENT CHILD HEALTH (Maternity/NICU/Pediatrics) [phone]   [facility] [phone]   [facility] [phone]   [facility] [phone]   [facility] [phone]   [facility] [phone]   [facility] [phone]   [facility] [phone]   [facility] [phone]   [facility]  [facility] [phone]   [facility] [phone]   [facility] [phone]   [facility] [phone]

## 2025-03-24 NOTE — TELEPHONE ENCOUNTER
Agree with ED if not feeling better.  Celebrex sent as per her request, she should use sparingly, and only as needed, as medications like this can increase her risk for potential bleeding and gastritis

## 2025-03-24 NOTE — TELEPHONE ENCOUNTER
Pt called in after being discharged form the ER. She was discharged Sunday and when she got home she went to take one of the steps outside her house and fell and hit her head on concrete. She states she is very dizzy and refuses to go to the ER. She states she wants celebrex because that's what she said had last time she had a concussion. Please advise as pt was triaged and refused ER

## 2025-03-24 NOTE — TELEPHONE ENCOUNTER
Patient is requesting a refill for Clebrex but not seeing in chart.  Requested Prescriptions     Pending Prescriptions Disp Refills    LORazepam (ATIVAN) 2 mg tablet 180 tablet 1     Sig: Take 1 tablet (2 mg total) by mouth every 6 (six) hours as needed for anxiety Max 5 times a day

## 2025-03-24 NOTE — TELEPHONE ENCOUNTER
----- Message from Mikaela Naranjo DO sent at 3/21/2025  4:57 PM EDT -----  Regarding: Repeat EGD  Hello,    Patient was seen during recent [facility] hospitalization.  She will require repeat EGD in 8 to 12 weeks to assess esophagitis healing.  Please call patient to schedule an appointment.  Thank you.    Mikaela Naranjo DO, PGY-5  [facility] Gastroenterology Fellow

## 2025-03-25 RX ORDER — LORAZEPAM 2 MG/1
2 TABLET ORAL EVERY 6 HOURS PRN
Qty: 180 TABLET | Refills: 1 | OUTPATIENT
Start: 2025-03-25

## 2025-03-25 RX ORDER — AMLODIPINE BESYLATE 5 MG/1
5 TABLET ORAL DAILY
Qty: 90 TABLET | Refills: 1 | Status: SHIPPED | OUTPATIENT
Start: 2025-03-25

## 2025-03-27 ENCOUNTER — TELEPHONE (OUTPATIENT)
Dept: INTERNAL MEDICINE CLINIC | Facility: CLINIC | Age: 65
End: 2025-03-27

## 2025-03-28 ENCOUNTER — TELEPHONE (OUTPATIENT)
Dept: INTERNAL MEDICINE CLINIC | Facility: CLINIC | Age: 65
End: 2025-03-28

## 2025-03-31 ENCOUNTER — TELEPHONE (OUTPATIENT)
Dept: GASTROENTEROLOGY | Facility: CLINIC | Age: 65
End: 2025-03-31

## 2025-03-31 NOTE — TELEPHONE ENCOUNTER
Spoke to patient to get her scheduled for t he repeat EGD. She asked if I call her back in a week to schedule.

## 2025-03-31 NOTE — TELEPHONE ENCOUNTER
----- Message from Julianna EUCEDA sent at 3/24/2025  9:05 AM EDT -----  Regarding: FW: Repeat EGD    ----- Message -----  From: Mikaela Naranjo DO  Sent: 3/21/2025   4:58 PM EDT  To: #  Subject: Repeat EGD                                       Hello,    Patient was seen during recent [facility] hospitalization.  She will require repeat EGD in 8 to 12 weeks to assess esophagitis healing.  Please call patient to schedule an appointment.  Thank you.    Mikaela Naranjo DO, PGY-5  [facility] Gastroenterology Fellow

## 2025-04-03 ENCOUNTER — TRANSITIONAL CARE MANAGEMENT (OUTPATIENT)
Dept: INTERNAL MEDICINE CLINIC | Facility: CLINIC | Age: 65
End: 2025-04-03

## 2025-04-03 DIAGNOSIS — K22.10 EROSIVE ESOPHAGITIS: ICD-10-CM

## 2025-04-03 PROCEDURE — TCMPR

## 2025-04-03 RX ORDER — SUCRALFATE 1 G/1
1 TABLET ORAL
Qty: 270 TABLET | Refills: 1 | Status: SHIPPED | OUTPATIENT
Start: 2025-04-03

## 2025-04-09 ENCOUNTER — TELEPHONE (OUTPATIENT)
Dept: GASTROENTEROLOGY | Facility: CLINIC | Age: 65
End: 2025-04-09

## 2025-04-09 DIAGNOSIS — F41.9 ANXIETY: ICD-10-CM

## 2025-04-09 NOTE — TELEPHONE ENCOUNTER
----- Message from Julianna EUCEDA sent at 3/24/2025  9:05 AM EDT -----  Regarding: FW: Repeat EGD    ----- Message -----  From: Mikaela Naranjo DO  Sent: 3/21/2025   4:58 PM EDT  To: #  Subject: Repeat EGD                                       Hello,    Patient was seen during recent [facility] hospitalization.  She will require repeat EGD in 8 to 12 weeks to assess esophagitis healing.  Please call patient to schedule an appointment.  Thank you.    Mikaela Naranjo DO, PGY-5  [facility] Gastroenterology Fellow

## 2025-04-09 NOTE — TELEPHONE ENCOUNTER
Spoke to patient,she does not want to schedule repeat EGD at this time, she wants to wait till her office visit on 5/6/25

## 2025-04-11 RX ORDER — PAROXETINE 30 MG/1
60 TABLET, FILM COATED ORAL DAILY
Qty: 180 TABLET | OUTPATIENT
Start: 2025-04-11

## 2025-04-25 ENCOUNTER — HOSPITAL ENCOUNTER (INPATIENT)
Facility: HOSPITAL | Age: 65
LOS: 3 days | Discharge: HOME HEALTH | DRG: 369 | End: 2025-04-29
Attending: EMERGENCY MEDICINE | Admitting: INTERNAL MEDICINE
Payer: COMMERCIAL

## 2025-04-25 DIAGNOSIS — K22.10 EROSIVE ESOPHAGITIS: ICD-10-CM

## 2025-04-25 DIAGNOSIS — E86.0 DEHYDRATION: ICD-10-CM

## 2025-04-25 DIAGNOSIS — R10.9 ABDOMINAL PAIN: Primary | ICD-10-CM

## 2025-04-25 DIAGNOSIS — I10 HYPERTENSION: ICD-10-CM

## 2025-04-25 DIAGNOSIS — D64.9 ANEMIA, UNSPECIFIED TYPE: ICD-10-CM

## 2025-04-25 DIAGNOSIS — K92.0 HEMATEMESIS WITH NAUSEA: ICD-10-CM

## 2025-04-25 DIAGNOSIS — K21.00 GASTROESOPHAGEAL REFLUX DISEASE WITH ESOPHAGITIS WITHOUT HEMORRHAGE: ICD-10-CM

## 2025-04-25 DIAGNOSIS — Z13.9 ENCOUNTER FOR SCREENING INVOLVING SOCIAL DETERMINANTS OF HEALTH (SDOH): ICD-10-CM

## 2025-04-25 DIAGNOSIS — E53.8 FOLATE DEFICIENCY: ICD-10-CM

## 2025-04-25 PROCEDURE — 96374 THER/PROPH/DIAG INJ IV PUSH: CPT

## 2025-04-25 PROCEDURE — 99285 EMERGENCY DEPT VISIT HI MDM: CPT

## 2025-04-25 PROCEDURE — 99285 EMERGENCY DEPT VISIT HI MDM: CPT | Performed by: EMERGENCY MEDICINE

## 2025-04-25 PROCEDURE — 96375 TX/PRO/DX INJ NEW DRUG ADDON: CPT

## 2025-04-25 PROCEDURE — 93005 ELECTROCARDIOGRAM TRACING: CPT

## 2025-04-25 RX ORDER — ONDANSETRON 2 MG/ML
4 INJECTION INTRAMUSCULAR; INTRAVENOUS ONCE
Status: COMPLETED | OUTPATIENT
Start: 2025-04-25 | End: 2025-04-25

## 2025-04-25 RX ORDER — ONDANSETRON 2 MG/ML
1 INJECTION INTRAMUSCULAR; INTRAVENOUS ONCE
Status: COMPLETED | OUTPATIENT
Start: 2025-04-25 | End: 2025-04-25

## 2025-04-25 RX ORDER — FAMOTIDINE 10 MG/ML
20 INJECTION, SOLUTION INTRAVENOUS ONCE
Status: COMPLETED | OUTPATIENT
Start: 2025-04-25 | End: 2025-04-25

## 2025-04-25 RX ADMIN — FAMOTIDINE 20 MG: 10 INJECTION, SOLUTION INTRAVENOUS at 23:34

## 2025-04-25 RX ADMIN — ONDANSETRON 4 MG: 2 INJECTION, SOLUTION INTRAMUSCULAR; INTRAVENOUS at 23:34

## 2025-04-26 ENCOUNTER — APPOINTMENT (EMERGENCY)
Dept: RADIOLOGY | Facility: HOSPITAL | Age: 65
DRG: 369 | End: 2025-04-26
Payer: COMMERCIAL

## 2025-04-26 LAB
ABO GROUP BLD: NORMAL
ALBUMIN SERPL BCG-MCNC: 4 G/DL (ref 3.5–5)
ALP SERPL-CCNC: 65 U/L (ref 34–104)
ALT SERPL W P-5'-P-CCNC: 13 U/L (ref 7–52)
ANION GAP SERPL CALCULATED.3IONS-SCNC: 7 MMOL/L (ref 4–13)
ANION GAP SERPL CALCULATED.3IONS-SCNC: 9 MMOL/L (ref 4–13)
AST SERPL W P-5'-P-CCNC: 35 U/L (ref 13–39)
ATRIAL RATE: 89 BPM
BASOPHILS # BLD AUTO: 0.06 THOUSANDS/ÂΜL (ref 0–0.1)
BASOPHILS NFR BLD AUTO: 1 % (ref 0–1)
BILIRUB SERPL-MCNC: 0.38 MG/DL (ref 0.2–1)
BLD GP AB SCN SERPL QL: NEGATIVE
BUN SERPL-MCNC: 10 MG/DL (ref 5–25)
BUN SERPL-MCNC: 7 MG/DL (ref 5–25)
CALCIUM SERPL-MCNC: 9 MG/DL (ref 8.4–10.2)
CALCIUM SERPL-MCNC: 9 MG/DL (ref 8.4–10.2)
CHLORIDE SERPL-SCNC: 93 MMOL/L (ref 96–108)
CHLORIDE SERPL-SCNC: 98 MMOL/L (ref 96–108)
CO2 SERPL-SCNC: 24 MMOL/L (ref 21–32)
CO2 SERPL-SCNC: 29 MMOL/L (ref 21–32)
CREAT SERPL-MCNC: 0.54 MG/DL (ref 0.6–1.3)
CREAT SERPL-MCNC: 0.58 MG/DL (ref 0.6–1.3)
EOSINOPHIL # BLD AUTO: 0.07 THOUSAND/ÂΜL (ref 0–0.61)
EOSINOPHIL NFR BLD AUTO: 1 % (ref 0–6)
ERYTHROCYTE [DISTWIDTH] IN BLOOD BY AUTOMATED COUNT: 19.6 % (ref 11.6–15.1)
FERRITIN SERPL-MCNC: 7 NG/ML (ref 30–307)
GFR SERPL CREATININE-BSD FRML MDRD: 100 ML/MIN/1.73SQ M
GFR SERPL CREATININE-BSD FRML MDRD: 97 ML/MIN/1.73SQ M
GLUCOSE SERPL-MCNC: 110 MG/DL (ref 65–140)
GLUCOSE SERPL-MCNC: 91 MG/DL (ref 65–140)
HCT VFR BLD AUTO: 28.3 % (ref 34.8–46.1)
HGB BLD-MCNC: 8.3 G/DL (ref 11.5–15.4)
IMM GRANULOCYTES # BLD AUTO: 0.07 THOUSAND/UL (ref 0–0.2)
IMM GRANULOCYTES NFR BLD AUTO: 1 % (ref 0–2)
IRON SATN MFR SERPL: 2 % (ref 15–50)
IRON SERPL-MCNC: 14 UG/DL (ref 50–212)
LIPASE SERPL-CCNC: 24 U/L (ref 11–82)
LYMPHOCYTES # BLD AUTO: 0.6 THOUSANDS/ÂΜL (ref 0.6–4.47)
LYMPHOCYTES NFR BLD AUTO: 6 % (ref 14–44)
MCH RBC QN AUTO: 20.3 PG (ref 26.8–34.3)
MCHC RBC AUTO-ENTMCNC: 29.3 G/DL (ref 31.4–37.4)
MCV RBC AUTO: 69 FL (ref 82–98)
MONOCYTES # BLD AUTO: 0.63 THOUSAND/ÂΜL (ref 0.17–1.22)
MONOCYTES NFR BLD AUTO: 7 % (ref 4–12)
NEUTROPHILS # BLD AUTO: 7.88 THOUSANDS/ÂΜL (ref 1.85–7.62)
NEUTS SEG NFR BLD AUTO: 84 % (ref 43–75)
NRBC BLD AUTO-RTO: 0 /100 WBCS
P AXIS: 116 DEGREES
PLATELET # BLD AUTO: 321 THOUSANDS/UL (ref 149–390)
PMV BLD AUTO: 9.4 FL (ref 8.9–12.7)
POTASSIUM SERPL-SCNC: 3.2 MMOL/L (ref 3.5–5.3)
POTASSIUM SERPL-SCNC: 3.9 MMOL/L (ref 3.5–5.3)
PR INTERVAL: 126 MS
PROT SERPL-MCNC: 6.7 G/DL (ref 6.4–8.4)
QRS AXIS: 175 DEGREES
QRSD INTERVAL: 80 MS
QT INTERVAL: 382 MS
QTC INTERVAL: 464 MS
RBC # BLD AUTO: 4.09 MILLION/UL (ref 3.81–5.12)
RH BLD: POSITIVE
SODIUM SERPL-SCNC: 126 MMOL/L (ref 135–147)
SODIUM SERPL-SCNC: 134 MMOL/L (ref 135–147)
SPECIMEN EXPIRATION DATE: NORMAL
T WAVE AXIS: 117 DEGREES
TIBC SERPL-MCNC: 625.8 UG/DL (ref 250–450)
TRANSFERRIN SERPL-MCNC: 447 MG/DL (ref 203–362)
UIBC SERPL-MCNC: 612 UG/DL (ref 155–355)
VENTRICULAR RATE: 89 BPM
WBC # BLD AUTO: 9.31 THOUSAND/UL (ref 4.31–10.16)

## 2025-04-26 PROCEDURE — 85025 COMPLETE CBC W/AUTO DIFF WBC: CPT

## 2025-04-26 PROCEDURE — 82728 ASSAY OF FERRITIN: CPT | Performed by: INTERNAL MEDICINE

## 2025-04-26 PROCEDURE — 80053 COMPREHEN METABOLIC PANEL: CPT

## 2025-04-26 PROCEDURE — 86900 BLOOD TYPING SEROLOGIC ABO: CPT | Performed by: INTERNAL MEDICINE

## 2025-04-26 PROCEDURE — 99222 1ST HOSP IP/OBS MODERATE 55: CPT | Performed by: INTERNAL MEDICINE

## 2025-04-26 PROCEDURE — 86850 RBC ANTIBODY SCREEN: CPT | Performed by: INTERNAL MEDICINE

## 2025-04-26 PROCEDURE — 74177 CT ABD & PELVIS W/CONTRAST: CPT

## 2025-04-26 PROCEDURE — 86901 BLOOD TYPING SEROLOGIC RH(D): CPT | Performed by: INTERNAL MEDICINE

## 2025-04-26 PROCEDURE — 83550 IRON BINDING TEST: CPT | Performed by: INTERNAL MEDICINE

## 2025-04-26 PROCEDURE — 86923 COMPATIBILITY TEST ELECTRIC: CPT

## 2025-04-26 PROCEDURE — 83540 ASSAY OF IRON: CPT | Performed by: INTERNAL MEDICINE

## 2025-04-26 PROCEDURE — 36415 COLL VENOUS BLD VENIPUNCTURE: CPT

## 2025-04-26 PROCEDURE — 83690 ASSAY OF LIPASE: CPT

## 2025-04-26 PROCEDURE — 96376 TX/PRO/DX INJ SAME DRUG ADON: CPT

## 2025-04-26 PROCEDURE — 99223 1ST HOSP IP/OBS HIGH 75: CPT | Performed by: INTERNAL MEDICINE

## 2025-04-26 PROCEDURE — 93010 ELECTROCARDIOGRAM REPORT: CPT | Performed by: INTERNAL MEDICINE

## 2025-04-26 PROCEDURE — 80048 BASIC METABOLIC PNL TOTAL CA: CPT

## 2025-04-26 PROCEDURE — 96360 HYDRATION IV INFUSION INIT: CPT

## 2025-04-26 PROCEDURE — 96361 HYDRATE IV INFUSION ADD-ON: CPT

## 2025-04-26 PROCEDURE — RECHECK

## 2025-04-26 RX ORDER — SENNOSIDES 8.6 MG
1 TABLET ORAL DAILY
Status: DISCONTINUED | OUTPATIENT
Start: 2025-04-26 | End: 2025-04-29 | Stop reason: HOSPADM

## 2025-04-26 RX ORDER — LORAZEPAM 1 MG/1
2 TABLET ORAL EVERY 6 HOURS PRN
Status: DISCONTINUED | OUTPATIENT
Start: 2025-04-26 | End: 2025-04-29 | Stop reason: HOSPADM

## 2025-04-26 RX ORDER — ONDANSETRON 2 MG/ML
4 INJECTION INTRAMUSCULAR; INTRAVENOUS EVERY 6 HOURS PRN
Status: DISCONTINUED | OUTPATIENT
Start: 2025-04-26 | End: 2025-04-29 | Stop reason: HOSPADM

## 2025-04-26 RX ORDER — LORATADINE 10 MG/1
10 TABLET ORAL DAILY
Status: DISCONTINUED | OUTPATIENT
Start: 2025-04-26 | End: 2025-04-29 | Stop reason: HOSPADM

## 2025-04-26 RX ORDER — SUCRALFATE 1 G/1
1 TABLET ORAL
Status: DISCONTINUED | OUTPATIENT
Start: 2025-04-26 | End: 2025-04-29 | Stop reason: HOSPADM

## 2025-04-26 RX ORDER — PANTOPRAZOLE SODIUM 40 MG/10ML
40 INJECTION, POWDER, LYOPHILIZED, FOR SOLUTION INTRAVENOUS EVERY 12 HOURS SCHEDULED
Status: DISCONTINUED | OUTPATIENT
Start: 2025-04-26 | End: 2025-04-29 | Stop reason: HOSPADM

## 2025-04-26 RX ORDER — POLYETHYLENE GLYCOL 3350 17 G/17G
17 POWDER, FOR SOLUTION ORAL DAILY
Status: DISCONTINUED | OUTPATIENT
Start: 2025-04-26 | End: 2025-04-26

## 2025-04-26 RX ORDER — HEPARIN SODIUM 5000 [USP'U]/ML
5000 INJECTION, SOLUTION INTRAVENOUS; SUBCUTANEOUS EVERY 8 HOURS SCHEDULED
Status: DISCONTINUED | OUTPATIENT
Start: 2025-04-26 | End: 2025-04-26

## 2025-04-26 RX ORDER — PAROXETINE 20 MG/1
60 TABLET, FILM COATED ORAL DAILY
Status: DISCONTINUED | OUTPATIENT
Start: 2025-04-26 | End: 2025-04-29 | Stop reason: HOSPADM

## 2025-04-26 RX ORDER — AMLODIPINE BESYLATE 5 MG/1
5 TABLET ORAL DAILY
Status: DISCONTINUED | OUTPATIENT
Start: 2025-04-26 | End: 2025-04-29 | Stop reason: HOSPADM

## 2025-04-26 RX ORDER — ONDANSETRON 2 MG/ML
4 INJECTION INTRAMUSCULAR; INTRAVENOUS ONCE
Status: COMPLETED | OUTPATIENT
Start: 2025-04-26 | End: 2025-04-26

## 2025-04-26 RX ORDER — MAGNESIUM HYDROXIDE/ALUMINUM HYDROXICE/SIMETHICONE 120; 1200; 1200 MG/30ML; MG/30ML; MG/30ML
30 SUSPENSION ORAL ONCE
Status: COMPLETED | OUTPATIENT
Start: 2025-04-26 | End: 2025-04-26

## 2025-04-26 RX ORDER — POTASSIUM CHLORIDE 1500 MG/1
40 TABLET, EXTENDED RELEASE ORAL ONCE
Status: DISCONTINUED | OUTPATIENT
Start: 2025-04-26 | End: 2025-04-26

## 2025-04-26 RX ORDER — SODIUM CHLORIDE, SODIUM GLUCONATE, SODIUM ACETATE, POTASSIUM CHLORIDE, MAGNESIUM CHLORIDE, SODIUM PHOSPHATE, DIBASIC, AND POTASSIUM PHOSPHATE .53; .5; .37; .037; .03; .012; .00082 G/100ML; G/100ML; G/100ML; G/100ML; G/100ML; G/100ML; G/100ML
1000 INJECTION, SOLUTION INTRAVENOUS ONCE
Status: COMPLETED | OUTPATIENT
Start: 2025-04-26 | End: 2025-04-26

## 2025-04-26 RX ORDER — LEVOTHYROXINE SODIUM 25 UG/1
25 TABLET ORAL DAILY
Status: DISCONTINUED | OUTPATIENT
Start: 2025-04-26 | End: 2025-04-29 | Stop reason: HOSPADM

## 2025-04-26 RX ORDER — POLYETHYLENE GLYCOL 3350 17 G/17G
17 POWDER, FOR SOLUTION ORAL 2 TIMES DAILY
Status: DISCONTINUED | OUTPATIENT
Start: 2025-04-26 | End: 2025-04-29 | Stop reason: HOSPADM

## 2025-04-26 RX ORDER — METOCLOPRAMIDE 5 MG/1
5 TABLET ORAL
Status: DISCONTINUED | OUTPATIENT
Start: 2025-04-26 | End: 2025-04-29 | Stop reason: HOSPADM

## 2025-04-26 RX ORDER — POTASSIUM CHLORIDE 1500 MG/1
40 TABLET, EXTENDED RELEASE ORAL 4 TIMES DAILY
Status: COMPLETED | OUTPATIENT
Start: 2025-04-26 | End: 2025-04-27

## 2025-04-26 RX ORDER — ACETAMINOPHEN 325 MG/1
975 TABLET ORAL ONCE
Status: COMPLETED | OUTPATIENT
Start: 2025-04-26 | End: 2025-04-26

## 2025-04-26 RX ORDER — QUETIAPINE 200 MG/1
400 TABLET, FILM COATED, EXTENDED RELEASE ORAL
Status: DISCONTINUED | OUTPATIENT
Start: 2025-04-26 | End: 2025-04-29 | Stop reason: HOSPADM

## 2025-04-26 RX ADMIN — QUETIAPINE 400 MG: 200 TABLET, FILM COATED, EXTENDED RELEASE ORAL at 21:47

## 2025-04-26 RX ADMIN — SUCRALFATE 1 G: 1 TABLET ORAL at 11:39

## 2025-04-26 RX ADMIN — PANTOPRAZOLE SODIUM 40 MG: 40 INJECTION, POWDER, FOR SOLUTION INTRAVENOUS at 06:39

## 2025-04-26 RX ADMIN — SUCRALFATE 1 G: 1 TABLET ORAL at 17:29

## 2025-04-26 RX ADMIN — LEVOTHYROXINE SODIUM 25 MCG: 0.03 TABLET ORAL at 08:37

## 2025-04-26 RX ADMIN — LORATADINE 10 MG: 10 TABLET ORAL at 08:37

## 2025-04-26 RX ADMIN — SUCRALFATE 1 G: 1 TABLET ORAL at 06:39

## 2025-04-26 RX ADMIN — IOHEXOL 85 ML: 350 INJECTION, SOLUTION INTRAVENOUS at 00:48

## 2025-04-26 RX ADMIN — AMLODIPINE BESYLATE 5 MG: 5 TABLET ORAL at 08:40

## 2025-04-26 RX ADMIN — METOCLOPRAMIDE 5 MG: 5 TABLET ORAL at 11:39

## 2025-04-26 RX ADMIN — PAROXETINE HYDROCHLORIDE 60 MG: 20 TABLET, FILM COATED ORAL at 08:37

## 2025-04-26 RX ADMIN — POTASSIUM CHLORIDE 40 MEQ: 1500 TABLET, EXTENDED RELEASE ORAL at 17:29

## 2025-04-26 RX ADMIN — SODIUM CHLORIDE, SODIUM GLUCONATE, SODIUM ACETATE, POTASSIUM CHLORIDE, MAGNESIUM CHLORIDE, SODIUM PHOSPHATE, DIBASIC, AND POTASSIUM PHOSPHATE 1000 ML: .53; .5; .37; .037; .03; .012; .00082 INJECTION, SOLUTION INTRAVENOUS at 15:18

## 2025-04-26 RX ADMIN — PANTOPRAZOLE SODIUM 40 MG: 40 INJECTION, POWDER, FOR SOLUTION INTRAVENOUS at 21:47

## 2025-04-26 RX ADMIN — METOCLOPRAMIDE 5 MG: 5 TABLET ORAL at 06:39

## 2025-04-26 RX ADMIN — ONDANSETRON 4 MG: 2 INJECTION INTRAMUSCULAR; INTRAVENOUS at 02:03

## 2025-04-26 RX ADMIN — IRON SUCROSE 300 MG: 20 INJECTION, SOLUTION INTRAVENOUS at 15:26

## 2025-04-26 RX ADMIN — METOCLOPRAMIDE 5 MG: 5 TABLET ORAL at 17:29

## 2025-04-26 RX ADMIN — ONDANSETRON 4 MG: 2 INJECTION, SOLUTION INTRAMUSCULAR; INTRAVENOUS at 13:12

## 2025-04-26 RX ADMIN — ALUMINUM HYDROXIDE, MAGNESIUM HYDROXIDE, AND SIMETHICONE 30 ML: 200; 200; 20 SUSPENSION ORAL at 01:31

## 2025-04-26 RX ADMIN — ACETAMINOPHEN 975 MG: 325 TABLET, FILM COATED ORAL at 01:31

## 2025-04-26 RX ADMIN — SODIUM CHLORIDE 1000 ML: 0.9 INJECTION, SOLUTION INTRAVENOUS at 01:09

## 2025-04-26 RX ADMIN — POTASSIUM CHLORIDE 40 MEQ: 1500 TABLET, EXTENDED RELEASE ORAL at 21:47

## 2025-04-26 NOTE — ASSESSMENT & PLAN NOTE
Patient reports having multiple episodes of hematemesis.  Has vomited at least twice in the ER with no bloody emesis noted  Hemoglobin has been around 8 which is baseline in the last 1 month   CT abdomen and pelvis notable for esophagitis similar to prior findings on EGD  Continue to monitor hemoglobin every 6 to every 8 hours  N.p.o. for now  Start Protonix IV push twice daily  Holding Celebrex  Consult placed to GI

## 2025-04-26 NOTE — ASSESSMENT & PLAN NOTE
Has history of chronic hyponatremia  Recent Labs     04/26/25  0007   SODIUM 126*     Received 1 L fluid bolus  Repeat sodium to assess for further treatment  Check BMP this afternoon, then 6 AM

## 2025-04-26 NOTE — ASSESSMENT & PLAN NOTE
Lab Results   Component Value Date    IRON 14 (L) 04/26/2025    FERRITIN 7 (L) 04/26/2025    TIBC 625.8 (H) 04/26/2025    CONCFE 2 (L) 04/26/2025     Recent Labs     04/26/25  0007   HGB 8.3*     Noted on admission.  Due to grade D esophagitis, most likely.  Currently with vomiting blood prior to admission  Per Ganzoni equation, 704 mg iron deficit    Plan:  Transfuse goal greater than 7.0  We will give IV Venofer 300 mg IV x 3 days ending 4/28

## 2025-04-26 NOTE — ASSESSMENT & PLAN NOTE
Has history of chronic hyponatremia  Received 1 L fluid bolus  Repeat sodium to assess for further treatment

## 2025-04-26 NOTE — PLAN OF CARE
Problem: PAIN - ADULT  Goal: Verbalizes/displays adequate comfort level or baseline comfort level  Description: Interventions:- Encourage patient to monitor pain and request assistance- Assess pain using appropriate pain scale- Administer analgesics based on type and severity of pain and evaluate response- Implement non-pharmacological measures as appropriate and evaluate response- Consider cultural and social influences on pain and pain management- Notify physician/advanced practitioner if interventions unsuccessful or patient reports new pain  Outcome: Progressing     Problem: INFECTION - ADULT  Goal: Absence or prevention of progression during hospitalization  Description: INTERVENTIONS:- Assess and monitor for signs and symptoms of infection- Monitor lab/diagnostic results- Monitor all insertion sites, i.e. indwelling lines, tubes, and drains- Monitor endotracheal if appropriate and nasal secretions for changes in amount and color- [place] appropriate cooling/warming therapies per order- Administer medications as ordered- Instruct and encourage patient and family to use good hand hygiene technique- Identify and instruct in appropriate isolation precautions for identified infection/condition  Outcome: Progressing  Goal: Absence of fever/infection during neutropenic period  Description: INTERVENTIONS:- Monitor WBC  Outcome: Progressing     Problem: SAFETY ADULT  Goal: Patient will remain free of falls  Description: INTERVENTIONS:- Educate patient/family on patient safety including physical limitations- Instruct patient to call for assistance with activity - Consult OT/PT to assist with strengthening/mobility - Keep Call bell within reach- Keep bed low and locked with side rails adjusted as appropriate- Keep care items and personal belongings within reach- Initiate and maintain comfort rounds- Make Fall Risk Sign visible to staff- Offer Toileting every 2 Hours, in advance of need- Initiate/Maintain  bed/chairalarm- Obtain necessary fall risk management equipment: bed/chair alarm- Apply yellow socks and bracelet for high fall risk patients- Consider moving patient to room near nurses station  Outcome: Progressing  Goal: Maintain or return to baseline ADL function  Description: INTERVENTIONS:-  Assess patient's ability to carry out ADLs; assess patient's baseline for ADL function and identify physical deficits which impact ability to perform ADLs (bathing, care of mouth/teeth, toileting, grooming, dressing, etc.)- Assess/evaluate cause of self-care deficits - Assess range of motion- Assess patient's mobility; develop plan if impaired- Assess patient's need for assistive devices and provide as appropriate- Encourage maximum independence but intervene and supervise when necessary- Involve family in performance of ADLs- Assess for home care needs following discharge - Consider OT consult to assist with ADL evaluation and planning for discharge- Provide patient education as appropriate  Outcome: Progressing  Goal: Maintains/Returns to pre admission functional level  Description: INTERVENTIONS:- Perform AM-PAC 6 Click Basic Mobility/ Daily Activity assessment daily.- Set and communicate daily mobility goal to care team and patient/family/caregiver. - Collaborate with rehabilitation services on mobility goals if consulted- Perform Range of Motion 3 times a day.- Reposition patient every 2 hours.- Dangle patient 3 times a day- Stand patient 3 times a day- Ambulate patient 3 times a day- Out of bed to chair 3 times a day - Out of bed for meals 3 times a day- Out of bed for toileting- Record patient progress and toleration of activity level   Outcome: Progressing     Problem: DISCHARGE PLANNING  Goal: Discharge to home or other facility with appropriate resources  Description: INTERVENTIONS:- Identify barriers to discharge w/patient and caregiver- Arrange for needed discharge resources and transportation as appropriate-  Identify discharge learning needs (meds, wound care, etc.)- Arrange for interpretive services to assist at discharge as needed- Refer to Case Management Department for coordinating discharge planning if the patient needs post-hospital services based on physician/advanced practitioner order or complex needs related to functional status, cognitive ability, or social support system  Outcome: Progressing     Problem: Knowledge Deficit  Goal: Patient/family/caregiver demonstrates understanding of disease process, treatment plan, medications, and discharge instructions  Description: Complete learning assessment and assess knowledge base.Interventions:- Provide teaching at level of understanding- Provide teaching via preferred learning methods  Outcome: Progressing     Problem: Prexisting or High Potential for Compromised Skin Integrity  Goal: Skin integrity is maintained or improved  Description: INTERVENTIONS:- Identify patients at risk for skin breakdown- Assess and monitor skin integrity- Assess and monitor nutrition and hydration status- Monitor labs - Assess for incontinence - Turn and reposition patient- Assist with mobility/ambulation- Relieve pressure over bony prominences- Avoid friction and shearing- Provide appropriate hygiene as needed including keeping skin clean and dry- Evaluate need for skin moisturizer/barrier cream- Collaborate with interdisciplinary team - Patient/family teaching- Consider wound care consult   Outcome: Progressing

## 2025-04-26 NOTE — ED PROVIDER NOTES
ED Disposition       None          Assessment & Plan       Medical Decision Making  Patient is 64-year-old female presenting here today due to vomiting for the past 4 days.    [place] that I considered for the patient include gastroparesis, SBO, pancreatitis, Cshreiber's esophagus, Boerhave rupture, Eloisa Navas tears.    This patient I ordered a CBC, CMP, lipase, CT abdomen/pelvis with contrast, zofran, and Pepcid.    Patient signed out to oncoming team.    Amount and/or Complexity of Data Reviewed  Labs: ordered. Decision-making details documented in ED Course.  Radiology: ordered. Decision-making details documented in ED Course.    Risk  OTC drugs.  Prescription drug management.        ED Course as of 04/26/25 0159   Sat Apr 26, 2025   0058 CBC and differential(!)   0058 CBC and differential(!)  Anemia below baseline suggestive of iron deficiency anemia   0059 Comprehensive metabolic panel(!)  Hyponatremia   0134 CT abdomen pelvis with contrast       Medications   ondansetron (ZOFRAN) injection 4 mg (has no administration in time range)   ondansetron (FOR EMS ONLY) (ZOFRAN) 4 mg/2 mL injection 4 mg (0 mg Does not apply Given to EMS 4/25/25 2305)   Famotidine (PF) (PEPCID) injection 20 mg (20 mg Intravenous Given 4/25/25 2334)   ondansetron (ZOFRAN) injection 4 mg (4 mg Intravenous Given 4/25/25 2334)   iohexol (OMNIPAQUE) 350 MG/ML injection (SINGLE-DOSE) 85 mL (85 mL Intravenous Given 4/26/25 0048)   sodium chloride 0.9 % bolus 1,000 mL (1,000 mL Intravenous New Bag 4/26/25 0109)   acetaminophen (TYLENOL) tablet 975 mg (975 mg Oral Given 4/26/25 0131)   aluminum-magnesium hydroxide-simethicone (MAALOX) oral suspension 30 mL (30 mL Oral Given 4/26/25 0131)       ED Risk Strat Scores                    No data recorded        SBIRT 22yo+      Flowsheet Row Most Recent Value   Initial Alcohol Screen: US AUDIT-C     1. How often do you have a drink containing alcohol? 0 Filed at: 04/25/2025 6237   2. How many  drinks containing alcohol do you have on a typical day you are drinking?  0 Filed at: 04/25/2025 2239   3b. FEMALE Any Age, or MALE 65+: How often do you have 4 or more drinks on one occassion? 0 Filed at: 04/25/2025 2239   Audit-C Score 0 Filed at: 04/25/2025 2239   MIA: How many times in the past year have you...    Used an illegal drug or used a prescription medication for non-medical reasons? Never Filed at: 04/25/2025 2239                            History of Present Illness       Chief Complaint   Patient presents with    Abdominal Pain     Hx gastroparesis.States began with vomiting four days ago. Mid upper abdominal pain and nausea.        Past Medical History:   Diagnosis Date    Anemia     Anxiety     Arthritis     Back pain at L4-L5 level     Schreiber esophagus     Bulimia     Colon polyp     Disease of thyroid gland     hypothyroid    GERD (gastroesophageal reflux disease)     Hearing loss in left ear     History of transfusion     Hyperlipidemia     Hypertension     Hypothyroidism     Leukopenia     NMS (neuroleptic malignant syndrome) 01/03/2020    Pneumonia     RA (rheumatoid arthritis) (HCC)     in feet    Sciatica     Seizure (HCC)     due to medication mix up 8/2018 only one historically    Skin spots, red     lower right arm - poss from cat- no s/s infection    Synovial cyst of lumbar spine     Vertigo     Wears dentures     full set    Weight gain       Past Surgical History:   Procedure Laterality Date    BONE MARROW BIOPSY      BREAST SURGERY      enlargement with implants    CLOSED REDUCTION DISTAL FEMUR FRACTURE      COLONOSCOPY      COSMETIC SURGERY      DENTAL SURGERY      HIP ARTHROPLASTY Right 01/02/2020    Procedure: REVISION TOTAL HIP ARTHROPLASTY WITH REPAIR OF PARIPROSTHETIC FRACTURE - POSTERIOR APPROACH;  Surgeon: Dilan Pedersen MD;  Location: [facility];  Service: Orthopedics    IA AMPUTATION METATARSAL W/TOE SINGLE Left 04/07/2023    Procedure: AMPUTATION TOE 4th;  Surgeon:  Conner Bartlett DPM;  Location:  MAIN OR;  Service: Podiatry    [place] ARTHRP ACETBLR/PROX FEM PROSTC AGRFT/ALGRFT Right 12/11/2019    Procedure: ARTHROPLASTY HIP TOTAL ANTERIOR;  Surgeon: Dilan Pedersen MD;  Location: [facility];  Service: Orthopedics    AZ ESOPHAGOGASTRODUODENOSCOPY TRANSORAL DIAGNOSTIC N/A 05/11/2021    Procedure: ESOPHAGOGASTRODUODENOSCOPY (EGD);  Surgeon: David Cedeño MD;  Location: [facility];  Service: General    AZ LAPS RPR PARAESPHGL HRNA INCL FUNDPLSTY W/MESH N/A 05/11/2021    Procedure: REPAIR HERNIA PARAESOPHAGEAL  LAPAROSCOPIC;  Surgeon: David Cedeño MD;  [facility]: [facility];  Service: General    AZ UNLISTED PROCEDURE ESOPHAGUS N/A 05/11/2021    Procedure: FUNDOPLICATION TRANSORAL INCISIONLESS;  Surgeon: Brad Cadet MD;  Location: [facility];  Service: Gastroenterology    RHINOPLASTY      SINUS SURGERY      TOE AMPUTATION Left     2023 4th toe    TRANSORAL INCISIONLESS FUNDOPLICATION (TIF)  05/11/2021      Family History   Problem Relation Age of Onset    Uterine cancer Mother     Esophageal cancer Father     Colon cancer Maternal Grandmother       Social History     Tobacco Use    Smoking status: Never     Passive exposure: Past    Smokeless tobacco: Never   Vaping Use    Vaping status: Never Used   Substance Use Topics    Alcohol use: Not Currently    Drug use: Not Currently      E-Cigarette/Vaping    E-Cigarette Use Never User       E-Cigarette/Vaping Substances    Nicotine No     THC No     CBD No     Flavoring No     Other No     Unknown No       I have reviewed and agree with the history as documented.     Patient is 64-year-old female with a past medical history of gastroparesis, and hiatal hernia who presents here today due to vomiting for the past 4 days.  Patient says that she started vomiting 4 days ago, and last time she passed stool or passed gas was 3 days ago.  Patient says that her vomit has been black.  She has been vomiting about every 5 minutes.  She is unable  to keep solids down, but she is able to keep fluids down.  Patient also says that she feels lightheaded, however there is no room spinning sensations.  Patient has no chest pain, she does complain of some abdominal pain, no fevers.      Abdominal Pain  Associated symptoms: nausea and vomiting    Associated symptoms: no chest pain, no chills, no cough, no dysuria, no fever, no hematuria, no shortness of breath and no sore throat        Review of Systems   Constitutional:  Negative for chills and fever.   HENT:  Negative for ear pain and sore throat.    Eyes:  Negative for pain and visual disturbance.   Respiratory:  Negative for cough and shortness of breath.    Cardiovascular:  Negative for chest pain and palpitations.   Gastrointestinal:  Positive for abdominal pain, nausea and vomiting.   Genitourinary:  Negative for dysuria and hematuria.   Musculoskeletal:  Negative for arthralgias and back pain.   Skin:  Negative for color change and rash.   Neurological:  Negative for seizures and syncope.   All other systems reviewed and are negative.          Objective       ED Triage Vitals   Temperature Pulse Blood Pressure Respirations SpO2 Patient Position - Orthostatic VS   04/25/25 2236 04/25/25 2236 04/25/25 2236 04/25/25 2236 04/25/25 2236 04/25/25 2236   99.1 °F (37.3 °C) 92 135/73 18 94 % Sitting      Temp src Heart Rate Source BP Location FiO2 (%) Pain Score    -- 04/25/25 2236 04/25/25 2236 -- 04/25/25 2235     Monitor Right arm  10 - Worst Possible Pain      Vitals      Date and Time Temp Pulse SpO2 Resp BP Pain Score FACES Pain Rating User   04/26/25 0131 -- -- -- -- -- 5 -- CC   04/26/25 0107 -- 106 99 % 20 143/77 -- -- CC   04/25/25 2236 99.1 °F (37.3 °C) 92 94 % 18 135/73 10 - Worst Possible Pain -- CC   04/25/25 2235 -- -- -- -- -- 10 - Worst Possible Pain -- CC            Physical Exam  Vitals and nursing note reviewed.   Constitutional:       General: She is not in acute distress.     Appearance: She is  well-developed.   HENT:      Head: Normocephalic and atraumatic.   Eyes:      Conjunctiva/sclera: Conjunctivae normal.   Cardiovascular:      Rate and Rhythm: Normal rate and regular rhythm.      Heart sounds: No murmur heard.  Pulmonary:      Effort: Pulmonary effort is normal. No respiratory distress.      Breath sounds: Normal breath sounds. No stridor. No wheezing.   Abdominal:      Palpations: Abdomen is soft. There is no mass.      Tenderness: There is abdominal tenderness (Upper abdomen).      Hernia: No hernia is present.      Comments: Patient was actively vomiting in the room.  Vomit was liquidy, and had white flecks in it.   Musculoskeletal:         General: No swelling.      Cervical back: Neck supple.   Skin:     General: Skin is warm and dry.      Capillary Refill: Capillary refill takes less than 2 seconds.   Neurological:      Mental Status: She is alert.   Psychiatric:         Mood and Affect: Mood normal.         Results Reviewed       Procedure Component Value Units Date/Time    Comprehensive metabolic panel [[id number]]  (Abnormal) Collected: 04/26/25 0007    Lab Status: Final result Specimen: Blood from Arm, Left Updated: 04/26/25 0036     Sodium 126 mmol/L      Potassium 3.9 mmol/L      Chloride 93 mmol/L      CO2 24 mmol/L      ANION GAP 9 mmol/L      BUN 10 mg/dL      Creatinine 0.54 mg/dL      Glucose 110 mg/dL      Calcium 9.0 mg/dL      AST 35 U/L      ALT 13 U/L      Alkaline Phosphatase 65 U/L      Total Protein 6.7 g/dL      Albumin 4.0 g/dL      Total Bilirubin 0.38 mg/dL      eGFR 100 ml/min/1.73sq m     Narrative:      National Kid[employer] Foundation guidelines for Chronic Kidney Disease (CKD):     Stage 1 with normal or high GFR (GFR > 90 mL/min/1.73 square meters)    Stage 2 Mild CKD (GFR = 60-89 mL/min/1.73 square meters)    Stage 3A Moderate CKD (GFR = 45-59 mL/min/1.73 square meters)    Stage 3B Moderate CKD (GFR = 30-44 mL/min/1.73 square meters)    Stage 4 Severe CKD (GFR =  15-29 mL/min/1.73 square meters)    Stage 5 End Stage CKD (GFR <15 mL/min/1.73 square meters)  Note: GFR calculation is accurate only with a steady state creatinine    Lipase [[id number]]  (Normal) Collected: 04/26/25 0007    Lab Status: Final result Specimen: Blood from Arm, Left Updated: 04/26/25 0036     Lipase 24 u/L     CBC and differential [[id number]]  (Abnormal) Collected: 04/26/25 0007    Lab Status: Final result Specimen: Blood from Arm, Left Updated: 04/26/25 0013     WBC 9.31 Thousand/uL      RBC 4.09 Million/uL      Hemoglobin 8.3 g/dL      Hematocrit 28.3 %      MCV 69 fL      MCH 20.3 pg      MCHC 29.3 g/dL      RDW 19.6 %      MPV 9.4 fL      Platelets 321 Thousands/uL      nRBC 0 /100 WBCs      Segmented % 84 %      Immature Grans % 1 %      Lymphocytes % 6 %      Monocytes % 7 %      Eosinophils Relative 1 %      Basophils Relative 1 %      Absolute Neutrophils 7.88 Thousands/µL      Absolute Immature Grans 0.07 Thousand/uL      Absolute Lymphocytes 0.60 Thousands/µL      Absolute Monocytes 0.63 Thousand/µL      Eosinophils Absolute 0.07 Thousand/µL      Basophils Absolute 0.06 Thousands/µL             CT abdomen pelvis with contrast   Final Interpretation by Rakan Harvey DO (04/26 0140)      Small hiatal hernia with marked wall thickening of the distal esophagus, consistent with esophagitis as seen on recent EGD.      Cholelithiasis            Workstation performed: [id number]             Complex Venous Access Line    Date/Time: 4/26/2025 12:39 AM    Performed by: Ross Reyes MD  Authorized by: Ross Reyes MD    Patient location:  ED  Consent:     Consent obtained:  Verbal  Pre-procedure details:     Hand hygiene: Hand hygiene performed prior to insertion      Sterile barrier technique: All elements of maximal sterile technique followed      Skin preparation:  Alcohol  Procedure details:     Complex Venous Access Line Type: US Guided Peripheral IV      Orientation:  Left    Location:   Forearm    Catheter size:  18 gauge    Approach: percutaneous technique used      Ultrasound image availability:  Not saved    Sterile ultrasound techniques: Sterile gel and sterile probe covers were used      Number of attempts:  1    Successful placement: yes    Anesthesia (see MAR for exact dosages):     Anesthesia method:  None  Post-procedure details:     Post-procedure:  Dressing applied      ED Medication and Procedure Management   Prior to Admission Medications   Prescriptions Last Dose Informant Patient Reported? Taking?   LORazepam (ATIVAN) 2 mg tablet   No No   Sig: Take 1 tablet (2 mg total) by mouth every 6 (six) hours as needed for anxiety Max 5 times a day   PARoxetine (PAXIL) 30 mg tablet   No No   Sig: Take 2 tablets (60 mg total) by mouth in the morning   QUEtiapine (SEROquel XR) 400 mg 24 hr tablet   No No   Sig: Take 1 tablet (400 mg total) by mouth daily at bedtime   amLODIPine (NORVASC) 5 mg tablet   No No   Sig: Take 1 tablet (5 mg total) by mouth daily   celecoxib (CeleBREX) 200 mg capsule   No No   Sig: Take 1 capsule (200 mg total) by mouth daily as needed for mild pain   levothyroxine 25 mcg tablet   No No   Sig: Take 1 tablet (25 mcg total) by mouth daily   loratadine (CLARITIN) 10 mg tablet   No No   Sig: Take 1 tablet (10 mg total) by mouth daily   meclizine (ANTIVERT) 25 mg tablet   No No   Sig: Take 1 tablet (25 mg total) by mouth every 8 (eight) hours as needed for dizziness   metoclopramide (REGLAN) 5 mg tablet   No No   Sig: Take 1 tablet (5 mg total) by mouth 3 (three) times a day before meals   omeprazole (PriLOSEC) 40 MG capsule   No No   Sig: Take 1 capsule (40 mg total) by mouth 2 (two) times a day   ondansetron (ZOFRAN) 4 mg tablet   No No   Sig: Take 1 tablet (4 mg total) by mouth every 8 (eight) hours as needed for nausea or vomiting   pantoprazole (PROTONIX) 40 mg tablet   No No   Sig: TAKE 1 TABLET BY MOUTH TWICE A DAY   sucralfate (CARAFATE) 1 g tablet   No No   Sig:  Take 1 tablet (1 g total) by mouth 3 (three) times a day with meals      Facility-Administered Medications: None     Patient's Medications   Discharge Prescriptions    No medications on file     No discharge procedures on file.  ED SEPSIS DOCUMENTATION            Ross Reyes MD  04/26/25 0155

## 2025-04-26 NOTE — ASSESSMENT & PLAN NOTE
Patient reports complaints of intractable nausea and vomiting which has since resolved with multiple rounds of Zofran in ER.  Also states has not had a bowel movement for 3 days.    Plan:  Continue to monitor symptoms for now  Sx controlled; no changes 4/26  Continue with PPI, Zofran as needed  Bowel regimen including senna daily and MiraLAX

## 2025-04-26 NOTE — CASE MANAGEMENT
Case Management Assessment & Discharge Planning Note    Patient name Irene Plascencia  Location [facility] 622/[facility] 622-01 MRN [id number]  : 1960 Date 2025       Current Admission Date: 2025  Current Admission Diagnosis:Intractable nausea and vomiting   Patient Active Problem List    Diagnosis Date Noted Date Diagnosed    Problems related to living alone 2025     Lack of social support 2025     Generalized weakness 2025     Ambulatory dysfunction 2025     Leukopenia 2024     Heartburn 2024     Gastroparesis 2024     Elevated vitamin B12 level 2024     Iron deficiency 2024     Acquired absence of other toe(s), unspecified side (HCC) 2024     Osteomyelitis, unspecified site, unspecified type (HCC) 2024     Vitamin B12 deficiency (dietary) anemia 2023     Folate deficiency 2023     Neutropenia (HCC) 2023     Post concussion syndrome 2023     Sepsis without acute organ dysfunction (HCC) 2023     Headache 2023     Nutritional anemia 2023     Stress incontinence 2023     Ulcer of toe, chronic, left, with unspecified severity (HCC) 2023     Thrombocytosis 2022     Abdominal pain 2022     Schreiber's esophagus 2022     Microcytic anemia 2022     Self neglect 2022     Pruritus 2022     Hematemesis 01/10/2022     Intractable nausea and vomiting 10/22/2021     Hyperlipidemia 2021     Word finding difficulty 2021     Hiatal hernia with GERD without esophagitis 2021     Polyp of colon 2021     Melena 2020     Cellulitis of left upper extremity 2020     Gastroesophageal reflux disease with esophagitis, Schreiber's esophagus and gastritis 2020     Rash 2020     Iron deficiency anemia 2020     Multiple excoriations 2020     Dizziness 2020     Fall 2020     Esophagitis 2020     Problem  related to living arrangement 01/27/2020     Hypokalemia      NMS (neuroleptic malignant syndrome) 01/03/2020     Elevated lactic acid level 01/03/2020     Preop exam for internal medicine 11/18/2019     Severe iron deficiency anemia  11/14/2019     Chronic bilateral low back pain without sciatica 11/01/2019     Right hip pain 07/15/2019     Primary hypertension 06/12/2019     Dermal hypersensitivity reaction 11/08/2018     Psychiatric disorder 08/21/2018     Schizoaffective disorder (HCC) 08/16/2018     Serotonin syndrome 08/13/2018     Other fatigue 06/19/2018     Spinal stenosis of lumbar region with neurogenic claudication 06/15/2018     Lumbar spondylosis 06/15/2018     T12 compression fracture (HCC) 06/15/2018     Synovial cyst of lumbar facet joint 06/15/2018     Localized osteoporosis with current pathological fracture with routine healing 05/25/2018     Lumbar radiculopathy 03/19/2018     DDD (degenerative disc disease), lumbar 03/19/2018     Spondylolisthesis at L4-L5 level 03/19/2018     Anxiety 10/31/2016     Acquired hypothyroidism 10/31/2016     Hyponatremia 09/25/2016     Other constipation 04/10/2014     Bulimia nervosa in remission 03/19/2014       LOS (days): 0  Geometric Mean LOS (GMLOS) (days):   Days to GMLOS:     OBJECTIVE:    Risk of Unplanned Readmission Score: 26.25         Current admission status: Inpatient       Preferred Pharmacy:   [facility]/pharmacy #[id number] - [place], [place] - [address]  [address] [address]  Phone: [phone] Fax: [phone]    Primary Care Provider: Raheem Cain MD    Primary Insurance: [facility]  Secondary Insurance:     ASSESSMENT:  Active Health Care Proxies       Darrin Daniel Health Care Representative - Life Partner   Primary Phone: [phone] (Home)                           Readmission Root Cause  30 Day Readmission: No    Patient Information  Admitted from:: Home  Mental Status:  Alert  During Assessment patient was accompanied by: Not accompanied during assessment  Assessment information provided by:: Patient  Primary Caregiver: Self  Support Systems: Self, Friends/neighbors  County of Residence: [place]  What city do you live in?: [place]  Home entry access options. Select all that apply.: Stairs  Number of steps to enter home.: 3  Type of Current Residence: [facility]  Living Arrangements: Lives Alone  Is patient a ?: No    Activities of Daily Living Prior to Admission  Functional Status: Independent  Completes ADLs independently?: Yes  Ambulates independently?: Yes  Does patient use assisted devices?: No  Does patient currently own DME?: No  Does patient have a history of Outpatient Therapy (PT/OT)?: No  Does the patient have a history of Short-Term Rehab?: Yes ([facility])  Does patient have a history of HHC?: No  Does patient currently have HHC?: No         Patient Information Continued  Income Source: SSI/SSD  Does patient have prescription coverage?: Yes  Can the patient afford their medications and any related supplies (such as glucometers or test strips)?: Yes  Does patient receive dialysis treatments?: No  Does patient have a history of substance abuse?: No  Does patient have a history of Mental Health Diagnosis?: Yes (depression, anxiety)  Is patient receiving treatment for mental health?: Yes  Has patient received inpatient treatment related to mental health in the last 2 years?: No         Means of Transportation  Means of Transport to Appts:: Friends          DISCHARGE DETAILS:    Discharge planning discussed with:: Patient  Freedom of Choice: Yes     CM contacted family/caregiver?: No- see comments (declined)  Were Treatment Team discharge recommendations reviewed with patient/caregiver?: Yes  Did patient/caregiver verbalize understanding of patient care needs?: Yes  Were patient/caregiver advised of the risks associated with not following Treatment Team discharge  recommendations?: Yes          Treatment Team Recommendation: Home  Discharge Destination Plan:: Home         Additional Comments: CM student met with patient at bedside to go over opens. Pt states she still lives alone in a ranch home. Pt is independent at baseline with no DME. CM will remain available for discharge neeeds .Will likely need lyft at d/c

## 2025-04-26 NOTE — ASSESSMENT & PLAN NOTE
Endorsing not having a bowel movement in 3 days.  -Continue with MiraLAX twice daily and senna  -If patient fails to move her bowels can give enemas or consider half of bowel prep with MiraLAX

## 2025-04-26 NOTE — ASSESSMENT & PLAN NOTE
Patient is a 64-year-old female with a history of GERD s/p CTIF in 2021 Schreiber's esophagus and severe gastroparesis who was recently in the hospital in March for similar concerns of 2 episodes of hematemesis with melena.  Endorse 1 episode of hematemesis at home yesterday denied any melena.  In March hospitalization patient underwent an EGD which was notable for grade D esophagitis due to large hiatal hernia.  Patient's hemoglobin remained stable today.  Recommend continuing with IV PPI and Carafate no indication for endoscopic evaluation at this time as the patient recently had an EGD with stable hemoglobin levels currently.  Can slowly advance diet and continue to monitor hemoglobin at this time.    -Clear liquid diet, and advance as tolerated  -Continue with IV PPI  -Start Carafate 3 times daily  -Continue to monitor hemoglobin daily, transfuse if less than 7  -Will need outpatient follow-up with CT surgery due to history of cTIF to discuss consideration of hiatal hernia repair  -No indication for EGD at this time as patient recently had EGD if patient develops hemodynamic instability or further episodes of hematemesis may consider inpatient EGD.

## 2025-04-26 NOTE — CONSULTS
Consultation - Gastroenterology   Name: Irene Plascencia 64 y.o. female I MRN: [id number]  [facility] I Date of Admission: 4/25/2025   Date of Service: 4/26/2025 I Hospital Day: 0       Inpatient consult to gastroenterology     Date/Time  4/26/2025 11:01 AM     Performed by  Adrienne Hernandez MD   Authorized by  Fela Perez MD           Physician Requesting Evaluation: Roslyn Dillon MD   Reason for Evaluation / Principal Problem: Hematemesis    Assessment & Plan  Hematemesis  Patient is a 64-year-old female with a history of GERD s/p CTIF in 2021 Schreiber's esophagus and severe gastroparesis who was recently in the hospital in March for similar concerns of 2 episodes of hematemesis with melena.  Endorse 1 episode of hematemesis at home yesterday denied any melena.  In March hospitalization patient underwent an EGD which was notable for grade D esophagitis due to large hiatal hernia.  Patient's hemoglobin remained stable today.  Recommend continuing with IV PPI and Carafate no indication for endoscopic evaluation at this time as the patient recently had an EGD with stable hemoglobin levels currently.  Can slowly advance diet and continue to monitor hemoglobin at this time.    -Clear liquid diet, and advance as tolerated  -Continue with IV PPI  -Start Carafate 3 times daily  -Continue to monitor hemoglobin daily, transfuse if less than 7  -Will need outpatient follow-up with CT surgery due to history of cTIF to discuss consideration of hiatal hernia repair  -No indication for EGD at this time as patient recently had EGD if patient develops hemodynamic instability or further episodes of hematemesis may consider inpatient EGD.  Other constipation  Endorsing not having a bowel movement in 3 days.  -Continue with MiraLAX twice daily and senna  -If patient fails to move her bowels can give enemas or consider half of bowel prep with MiraLAX  Intractable nausea and vomiting  See above    Gastroesophageal reflux disease with esophagitis, Schreiber's esophagus and gastritis  See above      History of Present Illness   HPI:  Irene Plascencia is a 64 y.o. female who presents due to concerns of hematemesis.  Past medical history is notable for Schreiber's esophagus, severe gastroparesis, hiatal hernia s/p C TIF in 2021 presents with 1 episode of hematemesis.  Patient states yesterday morning she developed 1 episode of vomiting with bright red blood which prompted her to come to the hospital.  Patient has had multiple hospitalizations due to concerns of hematemesis most recently being in March of this year.  At that time patient underwent an EGD was notable for grade D esophagitis due to a large hiatal hernia.  Patient was started on twice daily PPI and recommended to have a EGD repeat in 8 to 12 weeks, biopsies obtained at that time were negative.  Patient stated she has been taking her medications as prescribed however continues to having chest discomfort due to reflux.  At this time patient denies any further episodes of hematemesis since coming to the hospital and denies any melena.      EGD 03/21/2025:  The upper third of the esophagus and middle third of the esophagus appeared normal.  Irregular Z-line 34 cm from the incisors  Moderate, generalized grade D esophagitis with mucosal breaks measuring 5 mm or more, continuous between folds, covering 75% or more of the circumference appearing cobblestone, erythematous and fissured in the lower third of the esophagus; performed cold forceps biopsy  Medium hiatal hernia without Geo lesions present - GE junction 34 cm from the incisors, diaphragmatic impression 38 cm from the incisors, confirmed by retroflexion:  Hill classification: Grade III  Area of previous TIF observed with moderate slack passing the scope through.  Multiple fundic gland polyps measuring smaller than 5 mm in the stomach  The stomach and duodenum appeared normal.  The duodenum  appeared normal    Review of Systems      Objective :  Temp:  [98.5 °F (36.9 °C)-99.1 °F (37.3 °C)] 98.5 °F (36.9 °C)  HR:  [] 92  BP: (104-143)/(67-77) 118/72  Resp:  [12-20] 12  SpO2:  [94 %-99 %] 96 %  O2 Device: None (Room air)    Physical Exam  Pulmonary:      Effort: Pulmonary effort is normal.   Abdominal:      General: Abdomen is flat.      Palpations: Abdomen is soft.      Tenderness: There is abdominal tenderness in the right upper quadrant.   Skin:     General: Skin is warm.   Neurological:      Mental Status: She is alert.   Psychiatric:         Mood and Affect: Mood normal.           Lab Results: I have reviewed the following results:

## 2025-04-26 NOTE — ASSESSMENT & PLAN NOTE
Patient reports having multiple episodes of hematemesis.  Has vomited at least twice in the ER with no bloody emesis noted  Hemoglobin has been around 8 which is baseline in the last 1 month   CT abdomen and pelvis notable for esophagitis similar to prior findings on EGD  EGD 1 month ago showed grade D esophagitis    Plan:  As hemoglobin is stable without further hematemesis and patient, monitor daily CBC  Clear liquid diet per GI  Plan for EGD on 4/28, Monday  N.p.o. on Sunday night  Start Protonix IV push twice daily  Holding Celebrex/NSAIDs

## 2025-04-26 NOTE — PLAN OF CARE
Problem: PAIN - ADULT  Goal: Verbalizes/displays adequate comfort level or baseline comfort level  Description: Interventions:- Encourage patient to monitor pain and request assistance- Assess pain using appropriate pain scale- Administer analgesics based on type and severity of pain and evaluate response- Implement non-pharmacological measures as appropriate and evaluate response- Consider cultural and social influences on pain and pain management- Notify physician/advanced practitioner if interventions unsuccessful or patient reports new pain  Outcome: Progressing     Problem: INFECTION - ADULT  Goal: Absence or prevention of progression during hospitalization  Description: INTERVENTIONS:- Assess and monitor for signs and symptoms of infection- Monitor lab/diagnostic results- Monitor all insertion sites, i.e. indwelling lines, tubes, and drains- Monitor endotracheal if appropriate and nasal secretions for changes in amount and color- [place] appropriate cooling/warming therapies per order- Administer medications as ordered- Instruct and encourage patient and family to use good hand hygiene technique- Identify and instruct in appropriate isolation precautions for identified infection/condition  Outcome: Progressing  Goal: Absence of fever/infection during neutropenic period  Description: INTERVENTIONS:- Monitor WBC  Outcome: Progressing     Problem: SAFETY ADULT  Goal: Patient will remain free of falls  Description: INTERVENTIONS:- Educate patient/family on patient safety including physical limitations- Instruct patient to call for assistance with activity - Consult OT/PT to assist with strengthening/mobility - Keep Call bell within reach- Keep bed low and locked with side rails adjusted as appropriate- Keep care items and personal belongings within reach- Initiate and maintain comfort rounds- Make Fall Risk Sign visible to staff- Offer Toileting every  Hours, in advance of need- Initiate/Maintain alarm- Obtain  necessary fall risk management equipment: - Apply yellow socks and bracelet for high fall risk patients- Consider moving patient to room near nurses station  Outcome: Progressing  Goal: Maintain or return to baseline ADL function  Description: INTERVENTIONS:-  Assess patient's ability to carry out ADLs; assess patient's baseline for ADL function and identify physical deficits which impact ability to perform ADLs (bathing, care of mouth/teeth, toileting, grooming, dressing, etc.)- Assess/evaluate cause of self-care deficits - Assess range of motion- Assess patient's mobility; develop plan if impaired- Assess patient's need for assistive devices and provide as appropriate- Encourage maximum independence but intervene and supervise when necessary- Involve family in performance of ADLs- Assess for home care needs following discharge - Consider OT consult to assist with ADL evaluation and planning for discharge- Provide patient education as appropriate  Outcome: Progressing  Goal: Maintains/Returns to pre admission functional level  Description: INTERVENTIONS:- Perform AM-PAC 6 Click Basic Mobility/ Daily Activity assessment daily.- Set and communicate daily mobility goal to care team and patient/family/caregiver. - Collaborate with rehabilitation services on mobility goals if consulted- Perform Range of Motion  times a day.- Reposition patient every  hours.- Dangle patient  times a day- Stand patient  times a day- Ambulate patient  times a day- Out of bed to chair  times a day - Out of bed for meals  times a day- Out of bed for toileting- Record patient progress and toleration of activity level   Outcome: Progressing     Problem: DISCHARGE PLANNING  Goal: Discharge to home or other facility with appropriate resources  Description: INTERVENTIONS:- Identify barriers to discharge w/patient and caregiver- Arrange for needed discharge resources and transportation as appropriate- Identify discharge learning needs (meds, wound  care, etc.)- Arrange for interpretive services to assist at discharge as needed- Refer to Case Management Department for coordinating discharge planning if the patient needs post-hospital services based on physician/advanced practitioner order or complex needs related to functional status, cognitive ability, or social support system  Outcome: Progressing     Problem: Knowledge Deficit  Goal: Patient/family/caregiver demonstrates understanding of disease process, treatment plan, medications, and discharge instructions  Description: Complete learning assessment and assess knowledge base.Interventions:- Provide teaching at level of understanding- Provide teaching via preferred learning methods  Outcome: Progressing

## 2025-04-26 NOTE — H&P
H&P - Hospitalist   Name: Irene Plascencia 64 y.o. female I MRN: [id number]  Unit/Bed#: [facility] 622-01 I Date of Admission: 4/25/2025   Date of Service: 4/26/2025 I Hospital Day: 0     Assessment & Plan  Intractable nausea and vomiting  Patient reports complaints of intractable nausea and vomiting which has since resolved with multiple rounds of Zofran in ER.  Also states has not had a bowel movement for 3 days.  Continue to monitor symptoms for now  N.p.o. and advance diet as tolerated  Continue with PPI, Zofran as needed  Bowel regimen including senna daily and MiraLAX  GI consulted  Hematemesis  Patient reports having multiple episodes of hematemesis.  Has vomited at least twice in the ER with no bloody emesis noted  Hemoglobin has been around 8 which is baseline in the last 1 month   CT abdomen and pelvis notable for esophagitis similar to prior findings on EGD  Continue to monitor hemoglobin every 6 to every 8 hours  N.p.o. for now  Start Protonix IV push twice daily  Holding Celebrex  Consult placed to GI  Gastroesophageal reflux disease with esophagitis, Schreiber's esophagus and gastritis  Appears to have acute flare of known esophagitis  Continue with IV PPI  Hold Celebrex  Continue with sucralfate 3 times daily with meals  Gastroparesis  Continue with Reglan 3 times daily with meals  Primary hypertension  Blood pressure currently stable,  Schizoaffective disorder (HCC)  Continue with Seroquel, Paxil  Microcytic anemia  Likely due to bleed as above  Check iron stores, may benefit from IV iron infusion  GI consulted  Hyponatremia  Has history of chronic hyponatremia  Received 1 L fluid bolus  Repeat sodium to assess for further treatment      VTE Pharmacologic Prophylaxis:   Moderate Risk (Score 3-4) - Pharmacological DVT Prophylaxis Contraindicated. Sequential Compression Devices Ordered.  Code Status: Level 1 - Full Code   Discussion with family: Patient declined call to .     Anticipated  Length of Stay: Patient will be admitted on an inpatient basis with an anticipated length of stay of greater than 2 midnights secondary to above.    History of Present Illness   Chief Complaint: Vomiting blood     Irene Plascencia is a 64 y.o. female with a PMH of schizoaffective disorder, severe esophagitis/GERD, gastroparesis, hypothyroidism presenting with complaints of vomiting blood.  Symptoms started 4 days ago and have been persistent.  Patient also feels very nauseous.  Also admits to not having a bowel movement for 3 days despite taking Dulcolax.  Patient was admitted for similar symptoms last month and underwent EGD showing grade D moderate esophagitis.  Patient denies any chest pain, shortness of breath, abdominal pain, dysuria, dizziness/lightheadedness, leg swelling, cough, palpitations, focal weakness.  In ER, patient received multiple rounds of Zofran, Maalox, Pepcid with improvement of symptoms  Will admit for further management    Review of Systems  As per HPI  Historical Information   Past Medical History:   Diagnosis Date    Anemia     Anxiety     Arthritis     Back pain at L4-L5 level     Schreiber esophagus     Bulimia     Colon polyp     Disease of thyroid gland     hypothyroid    GERD (gastroesophageal reflux disease)     Hearing loss in left ear     History of transfusion     Hyperlipidemia     Hypertension     Hypothyroidism     Leukopenia     NMS (neuroleptic malignant syndrome) 01/03/2020    Pneumonia     RA (rheumatoid arthritis) (HCC)     in feet    Sciatica     Seizure (HCC)     due to medication mix up 8/2018 only one historically    Skin spots, red     lower right arm - poss from cat- no s/s infection    Synovial cyst of lumbar spine     Vertigo     Wears dentures     full set    Weight gain      Past Surgical History:   Procedure Laterality Date    BONE MARROW BIOPSY      BREAST SURGERY      enlargement with implants    CLOSED REDUCTION DISTAL FEMUR FRACTURE      COLONOSCOPY       COSMETIC SURGERY      DENTAL SURGERY      HIP ARTHROPLASTY Right 01/02/2020    Procedure: REVISION TOTAL HIP ARTHROPLASTY WITH REPAIR OF PARIPROSTHETIC FRACTURE - POSTERIOR APPROACH;  Surgeon: Dilan Pedersen MD;  Location: [facility];  Service: Orthopedics    LA AMPUTATION METATARSAL W/TOE SINGLE Left 04/07/2023    Procedure: AMPUTATION TOE 4th;  Surgeon: Conner Bartlett DPM;  Location: [facility];  Service: Podiatry    LA ARTHRP ACETBLR/PROX FEM PROSTC AGRFT/ALGRFT Right 12/11/2019    Procedure: ARTHROPLASTY HIP TOTAL ANTERIOR;  Surgeon: Dilan Pedersen MD;  Location: [facility];  Service: Orthopedics    LA ESOPHAGOGASTRODUODENOSCOPY TRANSORAL DIAGNOSTIC N/A 05/11/2021    Procedure: ESOPHAGOGASTRODUODENOSCOPY (EGD);  Surgeon: David Cedeño MD;  Location: [facility];  Service: General    LA LAPS RPR PARAESPHGL HRNA INCL FUNDPLSTY W/MESH N/A 05/11/2021    Procedure: REPAIR HERNIA PARAESOPHAGEAL  LAPAROSCOPIC;  Surgeon: David Cedeño MD;  [facility]: [facility];  Service: General    LA UNLISTED PROCEDURE ESOPHAGUS N/A 05/11/2021    Procedure: FUNDOPLICATION TRANSORAL INCISIONLESS;  Surgeon: Brad Cadet MD;  Location: [facility];  Service: Gastroenterology    RHINOPLASTY      SINUS SURGERY      TOE AMPUTATION Left     2023 4th toe    TRANSORAL INCISIONLESS FUNDOPLICATION (TIF)  05/11/2021     Social History     Tobacco Use    Smoking status: Never     Passive exposure: Past    Smokeless tobacco: Never   Vaping Use    Vaping status: Never Used   Substance and Sexual Activity    Alcohol use: Not Currently    Drug use: Not Currently    Sexual activity: Not Currently     E-Cigarette/Vaping    E-Cigarette Use Never User      E-Cigarette/Vaping Substances    Nicotine No     THC No     CBD No     Flavoring No     Other No     Unknown No      Family History   Problem Relation Age of Onset    Uterine cancer Mother     Esophageal cancer Father     Colon cancer Maternal Grandmother      Social History:  Marital Status:  Single     Meds/Allergies   I have reviewed home medications using recent Epic encounter.  Prior to Admission medications    Medication Sig Start Date End Date Taking? Authorizing Provider   amLODIPine (NORVASC) 5 mg tablet Take 1 tablet (5 mg total) by mouth daily 3/25/25   Raheem Cain MD   celecoxib (CeleBREX) 200 mg capsule Take 1 capsule (200 mg total) by mouth daily as needed for mild pain 3/24/25   Raheem Cain MD   levothyroxine 25 mcg tablet Take 1 tablet (25 mcg total) by mouth daily 3/5/25   Raheem Cain MD   loratadine (CLARITIN) 10 mg tablet Take 1 tablet (10 mg total) by mouth daily 11/22/24   Jonny Villa MD   LORazepam (ATIVAN) 2 mg tablet Take 1 tablet (2 mg total) by mouth every 6 (six) hours as needed for anxiety Max 5 times a day 3/24/25   Raheem Cain MD   meclizine (ANTIVERT) 25 mg tablet Take 1 tablet (25 mg total) by mouth every 8 (eight) hours as needed for dizziness 3/23/25   Sofiya Lofton PA-C   metoclopramide (REGLAN) 5 mg tablet Take 1 tablet (5 mg total) by mouth 3 (three) times a day before meals 2/17/25   Raheem Cain MD   omeprazole (PriLOSEC) 40 MG capsule Take 1 capsule (40 mg total) by mouth 2 (two) times a day 12/26/24   Jim Erickson DO   ondansetron (ZOFRAN) 4 mg tablet Take 1 tablet (4 mg total) by mouth every 8 (eight) hours as needed for nausea or vomiting 11/21/24   Raheem Cain MD   pantoprazole (PROTONIX) 40 mg tablet TAKE 1 TABLET BY MOUTH TWICE A DAY 3/24/25   Raheem Cain MD   PARoxetine (PAXIL) 30 mg tablet Take 2 tablets (60 mg total) by mouth in the morning 2/17/25   Raheem Cain MD   QUEtiapine (SEROquel XR) 400 mg 24 hr tablet Take 1 tablet (400 mg total) by mouth daily at bedtime 2/17/25   Raheem Cain MD   sucralfate (CARAFATE) 1 g tablet Take 1 tablet (1 g total) by mouth 3 (three) times a day with meals 4/3/25   Raheem Cain MD     Allergies   Allergen Reactions    Latex Anaphylaxis, Rash and Tongue Swelling     Band aids, adhesives wears  normal underwear, can eat bananas  USE PAPER TAPE    Risperdal [Risperidone] Shortness Of Breath     Rapid heart beat, SOB    Zyprexa [Olanzapine] Shortness Of Breath     Rapid heartbeat       Objective :  Temp:  [99 °F (37.2 °C)-99.1 °F (37.3 °C)] 99 °F (37.2 °C)  HR:  [] 89  BP: (127-143)/(67-77) 127/69  Resp:  [14-20] 16  SpO2:  [94 %-99 %] 94 %  O2 Device: None (Room air)    Physical Exam  Vitals and nursing note reviewed.   Constitutional:       General: She is not in acute distress.     Appearance: She is well-developed. She is obese. She is ill-appearing. She is not toxic-appearing or diaphoretic.   HENT:      Head: Normocephalic and atraumatic.      Mouth/Throat:      Mouth: Mucous membranes are moist.   Eyes:      General: No scleral icterus.     Conjunctiva/sclera: Conjunctivae normal.   Cardiovascular:      Rate and Rhythm: Normal rate and regular rhythm.      Heart sounds: No murmur heard.  Pulmonary:      Effort: Pulmonary effort is normal. No respiratory distress.      Breath sounds: Normal breath sounds. No wheezing or rales.   Abdominal:      General: Bowel sounds are normal. There is no distension.      Palpations: Abdomen is soft.      Tenderness: There is no abdominal tenderness. There is no guarding.   Musculoskeletal:         General: No swelling.      Cervical back: Neck supple.      Right lower leg: No edema.      Left lower leg: No edema.   Skin:     General: Skin is warm and dry.      Capillary Refill: Capillary refill takes less than 2 seconds.      Coloration: Skin is pale.      Findings: No rash.   Neurological:      General: No focal deficit present.      Mental Status: She is alert. Mental status is at baseline.      Motor: No weakness.   Psychiatric:         Mood and Affect: Mood normal.         Lines/Drains:            Lab Results: I have reviewed the following results:  Results from last 7 days   Lab Units 04/26/25  0007   WBC Thousand/uL 9.31   HEMOGLOBIN g/dL 8.3*    HEMATOCRIT % 28.3*   PLATELETS Thousands/uL 321   SEGS PCT % 84*   LYMPHO PCT % 6*   MONO PCT % 7   EOS PCT % 1     Results from last 7 days   Lab Units 04/26/25  0007   SODIUM mmol/L 126*   POTASSIUM mmol/L 3.9   CHLORIDE mmol/L 93*   CO2 mmol/L 24   BUN mg/dL 10   CREATININE mg/dL 0.54*   ANION GAP mmol/L 9   CALCIUM mg/dL 9.0   ALBUMIN g/dL 4.0   TOTAL BILIRUBIN mg/dL 0.38   ALK PHOS U/L 65   ALT U/L 13   AST U/L 35   GLUCOSE RANDOM mg/dL 110             Lab Results   Component Value Date    HGBA1C 5.0 05/13/2021    HGBA1C 4.5 12/04/2019    HGBA1C 4.9 11/14/2019           Imaging Results Review: I reviewed radiology reports from this admission including: CT abdomen/pelvis.  Other Study Results Review: EKG was reviewed.     Administrative Statements   I have spent a total time of 70 minutes in caring for this patient on the day of the visit/encounter including Instructions for management.    ** Please Note: This note has been constructed using a voice recognition system. **

## 2025-04-26 NOTE — ASSESSMENT & PLAN NOTE
Appears to have acute flare of known esophagitis    Plan:  Continue with IV PPI  Hold Celebrex  Continue with sucralfate 3 times daily with meals

## 2025-04-26 NOTE — ED ATTENDING ATTESTATION
4/25/2025  ILavonne MD, saw and evaluated the patient. I have discussed the patient with the resident/non-physician practitioner and agree with the resident's/non-physician practitioner's findings, Plan of Care, and MDM as documented in the resident's/non-physician practitioner's note, except where noted. All available labs and Radiology studies were reviewed.  I was present for key portions of any procedure(s) performed by the resident/non-physician practitioner and I was immediately available to provide assistance.       At this point I agree with the current assessment done in the Emergency Department.  I have conducted an independent evaluation of this patient a history and physical is as follows:    Chief Complaint   Patient presents with    Abdominal Pain     Hx gastroparesis.States began with vomiting four days ago. Mid upper abdominal pain and nausea.      64-year-old female with past medical history of hypertension, hyperlipidemia, rheumatoid arthritis, Schreiber esophagus, gastroparesis, presenting with nausea and vomiting.  Patient reports that over the past 4 days, she has not been able to keep much down by mouth due to nausea and vomiting.  She has had epigastric abdominal pain.  She has been able to keep some fluids down but not the solids.  She is on scheduled Reglan but she is not sure how much of the medication stay down.  In addition to her scheduled Reglan, she has tried sucralfate.  She has had some black spots in her emesis.  Last bowel movement and last passage of flatus was 3 days ago but patient does not feel distended.  No fevers.  No headaches.  Has had something similar happen previously.  Patient does feel light-headed.    Prior abdominal surgeries include paraesophageal hernia repair and transoral incision less fundoplication (FIT).    ED Triage Vitals   Temperature Pulse Respirations Blood Pressure SpO2   04/25/25 2236 04/25/25 2236 04/25/25 2236 04/25/25 2236 04/25/25 2236    99.1 °F (37.3 °C) 92 18 135/73 94 %      Temp Source Heart Rate Source Patient Position - Orthostatic VS BP Location FiO2 (%)   04/26/25 0501 04/25/25 2236 04/25/25 2236 04/25/25 2236 --   Oral Monitor Sitting Right arm       Pain Score       04/25/25 2235       10 - Worst Possible Pain           Vital signs and nursing notes reviewed    CONSTITUTIONAL: female appearing stated age resting in bed, in no acute distress.  Appears nauseated.  HEENT: atraumatic, normocephalic. Sclera anicteric, conjunctiva are not injected. Moist oral mucosa  CARDIOVASCULAR/CHEST: RRR, no M/R/G. 2+ radial pulses  PULMONARY: Breathing comfortably on RA. Breath sounds are equal and clear to auscultation  ABDOMEN: non-distended. BS present, normoactive.  Tender to palpation epigastrium and left upper quadrant, negative Lr's.  MSK: moves all extremities, no deformities, no peripheral edema, no calf asymmetry  NEURO: Awake, alert, and oriented x 3. Face symmetric. Moves all extremities spontaneously. No focal neurologic deficits  SKIN: Warm, appears well-perfused  MENTAL STATUS: Normal affect    Labs Reviewed   CBC AND DIFFERENTIAL - Abnormal       Result Value Ref Range Status    WBC 9.31  4.31 - 10.16 Thousand/uL Final    RBC 4.09  3.81 - 5.12 Million/uL Final    Hemoglobin 8.3 (*) 11.5 - 15.4 g/dL Final    Hematocrit 28.3 (*) 34.8 - 46.1 % Final    MCV 69 (*) 82 - 98 fL Final    MCH 20.3 (*) 26.8 - 34.3 pg Final    MCHC 29.3 (*) 31.4 - 37.4 g/dL Final    RDW 19.6 (*) 11.6 - 15.1 % Final    MPV 9.4  8.9 - 12.7 fL Final    Platelets 321  149 - 390 Thousands/uL Final    nRBC 0  /100 WBCs Final    Segmented % 84 (*) 43 - 75 % Final    Immature Grans % 1  0 - 2 % Final    Lymphocytes % 6 (*) 14 - 44 % Final    Monocytes % 7  4 - 12 % Final    Eosinophils Relative 1  0 - 6 % Final    Basophils Relative 1  0 - 1 % Final    Absolute Neutrophils 7.88 (*) 1.85 - 7.62 Thousands/µL Final    Absolute Immature Grans 0.07  0.00 - 0.20 Thousand/uL  Final    Absolute Lymphocytes 0.60  0.60 - 4.47 Thousands/µL Final    Absolute Monocytes 0.63  0.17 - 1.22 Thousand/µL Final    Eosinophils Absolute 0.07  0.00 - 0.61 Thousand/µL Final    Basophils Absolute 0.06  0.00 - 0.10 Thousands/µL Final   COMPREHENSIVE METABOLIC PANEL - Abnormal    Sodium 126 (*) 135 - 147 mmol/L Final    Potassium 3.9  3.5 - 5.3 mmol/L Final    Comment: Slightly Hemolyzed:Results may be affected.    Chloride 93 (*) 96 - 108 mmol/L Final    CO2 24  21 - 32 mmol/L Final    ANION GAP 9  4 - 13 mmol/L Final    BUN 10  5 - 25 mg/dL Final    Creatinine 0.54 (*) 0.60 - 1.30 mg/dL Final    Comment: Standardized to IDMS reference method    Glucose 110  65 - 140 mg/dL Final    Comment: If the patient is fasting, the ADA then defines impaired fasting glucose as > 100 mg/dL and diabetes as > or equal to 123 mg/dL.    Calcium 9.0  8.4 - 10.2 mg/dL Final    AST 35  13 - 39 U/L Final    Comment: Slightly Hemolyzed:Results may be affected.    ALT 13  7 - 52 U/L Final    Comment: Specimen collection should occur prior to Sulfasalazine administration due to the potential for falsely depressed results.     Alkaline Phosphatase 65  34 - 104 U/L Final    Total Protein 6.7  6.4 - 8.4 g/dL Final    Albumin 4.0  3.5 - 5.0 g/dL Final    Total Bilirubin 0.38  0.20 - 1.00 mg/dL Final    Comment: Use of this assay is not recommended for patients undergoing treatment with eltrombopag due to the potential for falsely elevated results.  N-acetyl-p-benzoquinone imine (metabolite of Acetaminophen) will generate erroneously low results in samples for patients that have taken an overdose of Acetaminophen.    eGFR 100  ml/min/1.73sq m Final    Narrative:     National Kid[employer] Disease Foundation guidelines for Chronic Kidney Disease (CKD):     Stage 1 with normal or high GFR (GFR > 90 mL/min/1.73 square meters)    Stage 2 Mild CKD (GFR = 60-89 mL/min/1.73 square meters)    Stage 3A Moderate CKD (GFR = 45-59 mL/min/1.73 square  meters)    Stage 3B Moderate CKD (GFR = 30-44 mL/min/1.73 square meters)    Stage 4 Severe CKD (GFR = 15-29 mL/min/1.73 square meters)    Stage 5 End Stage CKD (GFR <15 mL/min/1.73 square meters)  Note: GFR calculation is accurate only with a steady state creatinine   LIPASE - Normal    Lipase 24  11 - 82 u/L Final     CT abdomen pelvis with contrast   Final Result      Small hiatal hernia with marked wall thickening of the distal esophagus, consistent with esophagitis as seen on recent EGD.      Cholelithiasis            Workstation performed: [id number]               ED Course     Medications   ondansetron (FOR EMS ONLY) (ZOFRAN) 4 mg/2 mL injection 4 mg (0 mg Does not apply Given to EMS 4/25/25 2305)   Famotidine (PF) (PEPCID) injection 20 mg (20 mg Intravenous Given 4/25/25 2334)   ondansetron (ZOFRAN) injection 4 mg (4 mg Intravenous Given 4/25/25 2334)   iohexol (OMNIPAQUE) 350 MG/ML injection (SINGLE-DOSE) 85 mL (85 mL Intravenous Given 4/26/25 0048)   sodium chloride 0.9 % bolus 1,000 mL (0 mL Intravenous Stopped 4/26/25 0328)   acetaminophen (TYLENOL) tablet 975 mg (975 mg Oral Given 4/26/25 0131)   aluminum-magnesium hydroxide-simethicone (MAALOX) oral suspension 30 mL (30 mL Oral Given 4/26/25 0131)   ondansetron (ZOFRAN) injection 4 mg (4 mg Intravenous Given 4/26/25 0203)   multi-electrolyte (Plasmalyte-A/Isolyte-S PH 7.4/Normosol-R) IV bolus 1,000 mL (0 mL Intravenous Stopped 4/26/25 1618)     64-year-old female presenting with nausea and vomiting over the past 4 days, associated with some black specks in emesis.  Vital signs reviewed, afebrile and within normal limits.  Differential diagnosis includes dyspepsia, gastritis, esophagitis, peptic ulcer disease, pancreatitis, cholecystitis, obstruction, constipation, versus another etiology of symptoms.  Patient treated symptomatically as above.  CT of the abdomen pelvis with contrast is revealing of esophagitis, no other acute intra-abdominal pathology.   Labs reveal hyponatremia of 126, no transaminitis, normal lipase, CBC is with baseline anemia.  Despite therapies as above and given hyponatremia of 126, patient admitted to the hospital for further evaluation and treatment.    Procedure  ECG 12 Lead Documentation Only    Date/Time: 4/25/2025 11:17 PM    Performed by: Lavonne Barajas MD  Authorized by: Lavonne Barajas MD    Comments:      Normal sinus rhythm, ventricular rate 89, CA interval 126, QRS 80, QTc 464, right axis deviation which could be due to limb lead reversal, no STEMI, compared to prior EKG dated 3/20/2025, patient has change in axis, again, likely due to limb lead reversal.  No STEMI.

## 2025-04-26 NOTE — ASSESSMENT & PLAN NOTE
Appears to have acute flare of known esophagitis  Continue with IV PPI  Hold Celebrex  Continue with sucralfate 3 times daily with meals

## 2025-04-26 NOTE — ASSESSMENT & PLAN NOTE
Patient reports complaints of intractable nausea and vomiting which has since resolved with multiple rounds of Zofran in ER.  Also states has not had a bowel movement for 3 days.  Continue to monitor symptoms for now  N.p.o. and advance diet as tolerated  Continue with PPI, Zofran as needed  Bowel regimen including senna daily and MiraLAX  GI consulted

## 2025-04-27 LAB
ANION GAP SERPL CALCULATED.3IONS-SCNC: 6 MMOL/L (ref 4–13)
BASOPHILS # BLD AUTO: 0.03 THOUSANDS/ÂΜL (ref 0–0.1)
BASOPHILS NFR BLD AUTO: 1 % (ref 0–1)
BUN SERPL-MCNC: 5 MG/DL (ref 5–25)
CALCIUM SERPL-MCNC: 9 MG/DL (ref 8.4–10.2)
CHLORIDE SERPL-SCNC: 105 MMOL/L (ref 96–108)
CO2 SERPL-SCNC: 28 MMOL/L (ref 21–32)
CREAT SERPL-MCNC: 0.63 MG/DL (ref 0.6–1.3)
EOSINOPHIL # BLD AUTO: 0.29 THOUSAND/ÂΜL (ref 0–0.61)
EOSINOPHIL NFR BLD AUTO: 10 % (ref 0–6)
ERYTHROCYTE [DISTWIDTH] IN BLOOD BY AUTOMATED COUNT: 19.2 % (ref 11.6–15.1)
GFR SERPL CREATININE-BSD FRML MDRD: 95 ML/MIN/1.73SQ M
GLUCOSE SERPL-MCNC: 98 MG/DL (ref 65–140)
HCT VFR BLD AUTO: 26.9 % (ref 34.8–46.1)
HGB BLD-MCNC: 7.4 G/DL (ref 11.5–15.4)
IMM GRANULOCYTES # BLD AUTO: 0.02 THOUSAND/UL (ref 0–0.2)
IMM GRANULOCYTES NFR BLD AUTO: 1 % (ref 0–2)
LYMPHOCYTES # BLD AUTO: 0.8 THOUSANDS/ÂΜL (ref 0.6–4.47)
LYMPHOCYTES NFR BLD AUTO: 28 % (ref 14–44)
MCH RBC QN AUTO: 19.7 PG (ref 26.8–34.3)
MCHC RBC AUTO-ENTMCNC: 27.5 G/DL (ref 31.4–37.4)
MCV RBC AUTO: 72 FL (ref 82–98)
MONOCYTES # BLD AUTO: 0.43 THOUSAND/ÂΜL (ref 0.17–1.22)
MONOCYTES NFR BLD AUTO: 15 % (ref 4–12)
NEUTROPHILS # BLD AUTO: 1.32 THOUSANDS/ÂΜL (ref 1.85–7.62)
NEUTS SEG NFR BLD AUTO: 45 % (ref 43–75)
NRBC BLD AUTO-RTO: 1 /100 WBCS
PLATELET # BLD AUTO: 285 THOUSANDS/UL (ref 149–390)
PMV BLD AUTO: 9.7 FL (ref 8.9–12.7)
POTASSIUM SERPL-SCNC: 4 MMOL/L (ref 3.5–5.3)
RBC # BLD AUTO: 3.75 MILLION/UL (ref 3.81–5.12)
SODIUM SERPL-SCNC: 139 MMOL/L (ref 135–147)
WBC # BLD AUTO: 2.89 THOUSAND/UL (ref 4.31–10.16)

## 2025-04-27 PROCEDURE — NC001 PR NO CHARGE: Performed by: INTERNAL MEDICINE

## 2025-04-27 PROCEDURE — 99232 SBSQ HOSP IP/OBS MODERATE 35: CPT

## 2025-04-27 PROCEDURE — 80048 BASIC METABOLIC PNL TOTAL CA: CPT

## 2025-04-27 PROCEDURE — 85025 COMPLETE CBC W/AUTO DIFF WBC: CPT

## 2025-04-27 RX ADMIN — PANTOPRAZOLE SODIUM 40 MG: 40 INJECTION, POWDER, FOR SOLUTION INTRAVENOUS at 21:54

## 2025-04-27 RX ADMIN — METOCLOPRAMIDE 5 MG: 5 TABLET ORAL at 09:03

## 2025-04-27 RX ADMIN — LORATADINE 10 MG: 10 TABLET ORAL at 09:04

## 2025-04-27 RX ADMIN — PAROXETINE HYDROCHLORIDE 60 MG: 20 TABLET, FILM COATED ORAL at 09:05

## 2025-04-27 RX ADMIN — PANTOPRAZOLE SODIUM 40 MG: 40 INJECTION, POWDER, FOR SOLUTION INTRAVENOUS at 09:06

## 2025-04-27 RX ADMIN — METOCLOPRAMIDE 5 MG: 5 TABLET ORAL at 11:27

## 2025-04-27 RX ADMIN — SUCRALFATE 1 G: 1 TABLET ORAL at 16:57

## 2025-04-27 RX ADMIN — POTASSIUM CHLORIDE 40 MEQ: 1500 TABLET, EXTENDED RELEASE ORAL at 09:05

## 2025-04-27 RX ADMIN — SUCRALFATE 1 G: 1 TABLET ORAL at 11:27

## 2025-04-27 RX ADMIN — QUETIAPINE 400 MG: 200 TABLET, FILM COATED, EXTENDED RELEASE ORAL at 21:54

## 2025-04-27 RX ADMIN — SUCRALFATE 1 G: 1 TABLET ORAL at 09:03

## 2025-04-27 RX ADMIN — IRON SUCROSE 300 MG: 20 INJECTION, SOLUTION INTRAVENOUS at 09:23

## 2025-04-27 RX ADMIN — LEVOTHYROXINE SODIUM 25 MCG: 0.03 TABLET ORAL at 09:04

## 2025-04-27 RX ADMIN — LORAZEPAM 2 MG: 1 TABLET ORAL at 05:59

## 2025-04-27 RX ADMIN — METOCLOPRAMIDE 5 MG: 5 TABLET ORAL at 16:57

## 2025-04-27 NOTE — PLAN OF CARE
Problem: PAIN - ADULT  Goal: Verbalizes/displays adequate comfort level or baseline comfort level  Description: Interventions:- Encourage patient to monitor pain and request assistance- Assess pain using appropriate pain scale- Administer analgesics based on type and severity of pain and evaluate response- Implement non-pharmacological measures as appropriate and evaluate response- Consider cultural and social influences on pain and pain management- Notify physician/advanced practitioner if interventions unsuccessful or patient reports new pain  Outcome: Progressing     Problem: INFECTION - ADULT  Goal: Absence or prevention of progression during hospitalization  Description: INTERVENTIONS:- Assess and monitor for signs and symptoms of infection- Monitor lab/diagnostic results- Monitor all insertion sites, i.e. indwelling lines, tubes, and drains- Monitor endotracheal if appropriate and nasal secretions for changes in amount and color- [place] appropriate cooling/warming therapies per order- Administer medications as ordered- Instruct and encourage patient and family to use good hand hygiene technique- Identify and instruct in appropriate isolation precautions for identified infection/condition  Outcome: Progressing  Goal: Absence of fever/infection during neutropenic period  Description: INTERVENTIONS:- Monitor WBC  Outcome: Progressing     Problem: SAFETY ADULT  Goal: Patient will remain free of falls  Description: INTERVENTIONS:- Educate patient/family on patient safety including physical limitations- Instruct patient to call for assistance with activity - Consult OT/PT to assist with strengthening/mobility - Keep Call bell within reach- Keep bed low and locked with side rails adjusted as appropriate- Keep care items and personal belongings within reach- Initiate and maintain comfort rounds- Make Fall Risk Sign visible to staff- Offer Toileting every 2 Hours, in advance of need- Initiate/Maintain bed/chair  alarm- Obtain necessary fall risk management equipment: bed/chair alarm - Apply yellow socks and bracelet for high fall risk patients- Consider moving patient to room near nurses station  Outcome: Progressing  Goal: Maintain or return to baseline ADL function  Description: INTERVENTIONS:-  Assess patient's ability to carry out ADLs; assess patient's baseline for ADL function and identify physical deficits which impact ability to perform ADLs (bathing, care of mouth/teeth, toileting, grooming, dressing, etc.)- Assess/evaluate cause of self-care deficits - Assess range of motion- Assess patient's mobility; develop plan if impaired- Assess patient's need for assistive devices and provide as appropriate- Encourage maximum independence but intervene and supervise when necessary- Involve family in performance of ADLs- Assess for home care needs following discharge - Consider OT consult to assist with ADL evaluation and planning for discharge- Provide patient education as appropriate  Outcome: Progressing  Goal: Maintains/Returns to pre admission functional level  Description: INTERVENTIONS:- Perform AM-PAC 6 Click Basic Mobility/ Daily Activity assessment daily.- Set and communicate daily mobility goal to care team and patient/family/caregiver. - Collaborate with rehabilitation services on mobility goals if consulted- Perform Range of Motion 3 times a day.- Reposition patient every 2 hours.- Dangle patient 3 times a day- Stand patient 3 times a day- Ambulate patient 3 times a day- Out of bed to chair 3 times a day - Out of bed for meals 3 times a day- Out of bed for toileting- Record patient progress and toleration of activity level   Outcome: Progressing     Problem: DISCHARGE PLANNING  Goal: Discharge to home or other facility with appropriate resources  Description: INTERVENTIONS:- Identify barriers to discharge w/patient and caregiver- Arrange for needed discharge resources and transportation as appropriate- Identify  discharge learning needs (meds, wound care, etc.)- Arrange for interpretive services to assist at discharge as needed- Refer to Case Management Department for coordinating discharge planning if the patient needs post-hospital services based on physician/advanced practitioner order or complex needs related to functional status, cognitive ability, or social support system  Outcome: Progressing     Problem: Knowledge Deficit  Goal: Patient/family/caregiver demonstrates understanding of disease process, treatment plan, medications, and discharge instructions  Description: Complete learning assessment and assess knowledge base.Interventions:- Provide teaching at level of understanding- Provide teaching via preferred learning methods  Outcome: Progressing     Problem: Prexisting or High Potential for Compromised Skin Integrity  Goal: Skin integrity is maintained or improved  Description: INTERVENTIONS:- Identify patients at risk for skin breakdown- Assess and monitor skin integrity- Assess and monitor nutrition and hydration status- Monitor labs - Assess for incontinence - Turn and reposition patient- Assist with mobility/ambulation- Relieve pressure over bony prominences- Avoid friction and shearing- Provide appropriate hygiene as needed including keeping skin clean and dry- Evaluate need for skin moisturizer/barrier cream- Collaborate with interdisciplinary team - Patient/family teaching- Consider wound care consult   Outcome: Progressing

## 2025-04-27 NOTE — PROGRESS NOTES
Progress Note - Gastroenterology   Name: Irene Plascencia 64 y.o. female I MRN: [id number]  Unit/Bed#: [facility] 622-01 I Date of Admission: 4/25/2025   Date of Service: 4/27/2025 I Hospital Day: 1    Assessment & Plan  Hematemesis  Patient is a 64-year-old female with a history of GERD s/p CTIF in 2021 Schreiber's esophagus and severe gastroparesis who was recently in the hospital in March for similar concerns of 2 episodes of hematemesis with melena.  Endorse 1 episode of hematemesis at home yesterday denied any melena.  In March hospitalization patient underwent an EGD which was notable for grade D esophagitis due to large hiatal hernia. Recommend continuing with IV PPI and Carafate.  Hemoglobin did downtrend today however patient denies any melena or hematochezia.  Recommend observing over 24 hours, if hemoglobin continues to downtrend can consider EGD on Monday.    -Continue with IV PPI  -Start Carafate 3 times daily  -Continue to monitor hemoglobin daily, transfuse if less than 7  -Will need outpatient follow-up with CT surgery due to history of cTIF to discuss consideration of hiatal hernia repair  - Will make patient n.p.o. at midnight preemptively for possible EGD tomorrow if hemoglobin continues to downtrend  Other constipation  Endorsing not having a bowel movement in the past few days.  -Continue with MiraLAX twice daily and senna  -If patient fails to move her bowels can give enemas or consider half of bowel prep with MiraLAX  Intractable nausea and vomiting  See above   Gastroesophageal reflux disease with esophagitis, Schreiber's esophagus and gastritis  See above  Severe iron deficiency anemia           Subjective   Patient was seen and examined at bedside.  She states she is feeling better today denies any nausea at this time.    Objective :  Temp:  [98.2 °F (36.8 °C)-98.3 °F (36.8 °C)] 98.2 °F (36.8 °C)  HR:  [82-95] 95  BP: ()/(60-65) 106/65  Resp:  [16-20] 16  SpO2:  [94 %-95 %] 95 %  O2 Device:  None (Room air)    Physical Exam  Pulmonary:      Effort: Pulmonary effort is normal.   Abdominal:      General: Abdomen is flat.      Palpations: Abdomen is soft.   Skin:     General: Skin is warm.   Neurological:      Mental Status: She is alert.   Psychiatric:         Mood and Affect: Mood normal.           Lab Results: I have reviewed the following results:

## 2025-04-27 NOTE — ASSESSMENT & PLAN NOTE
Endorsing not having a bowel movement in the past few days.  -Continue with MiraLAX twice daily and senna  -If patient fails to move her bowels can give enemas or consider half of bowel prep with MiraLAX

## 2025-04-27 NOTE — ASSESSMENT & PLAN NOTE
Patient is a 64-year-old female with a history of GERD s/p CTIF in 2021 Schreiber's esophagus and severe gastroparesis who was recently in the hospital in March for similar concerns of 2 episodes of hematemesis with melena.  Endorse 1 episode of hematemesis at home yesterday denied any melena.  In March hospitalization patient underwent an EGD which was notable for grade D esophagitis due to large hiatal hernia. Recommend continuing with IV PPI and Carafate.  Hemoglobin did downtrend today however patient denies any melena or hematochezia.  Recommend observing over 24 hours, if hemoglobin continues to downtrend can consider EGD on Monday.    -Continue with IV PPI  -Start Carafate 3 times daily  -Continue to monitor hemoglobin daily, transfuse if less than 7  -Will need outpatient follow-up with CT surgery due to history of cTIF to discuss consideration of hiatal hernia repair  - Will make patient n.p.o. at midnight preemptively for possible EGD tomorrow if hemoglobin continues to downtrend

## 2025-04-27 NOTE — ASSESSMENT & PLAN NOTE
Has history of chronic hyponatremia  Recent Labs     04/26/25  0007 04/26/25  1510 04/27/25  0556   SODIUM 126* 134* 139     Received 1 L fluid bolus  Repeat sodium to assess for further treatment  Check BMP this afternoon, then 6 AM

## 2025-04-27 NOTE — ASSESSMENT & PLAN NOTE
Patient reports having multiple episodes of hematemesis.  Has vomited at least twice in the ER with no bloody emesis noted  Hemoglobin has been around 8 which is baseline in the last 1 month   CT abdomen and pelvis notable for esophagitis similar to prior findings on EGD  EGD 1 month ago showed grade D esophagitis    Plan:  As hemoglobin is stable without further hematemesis and patient, monitor daily CBC  Advance diet to regulars  Plan for EGD on 4/28, Monday  N.p.o. on 4/27 night  Will defer need for EGD to GI  Anticipate after EGD, patient may be discharged same day or following day  Start Protonix IV push twice daily  Holding Celebrex/NSAIDs

## 2025-04-27 NOTE — ASSESSMENT & PLAN NOTE
Improving/resolving    Patient reports complaints of intractable nausea and vomiting which has since resolved with multiple rounds of Zofran in ER.  Also states has not had a bowel movement for 3 days.      Plan:  Continue to monitor symptoms for now  Sx controlled; no changes 4/26 1/27  Will make n.p.o. on evening of 4/27 in anticipation of EGD on 4/28, see below  Continue with PPI, Zofran as needed  Bowel regimen including senna daily and MiraLAX

## 2025-04-27 NOTE — ASSESSMENT & PLAN NOTE
Lab Results   Component Value Date    IRON 14 (L) 04/26/2025    FERRITIN 7 (L) 04/26/2025    TIBC 625.8 (H) 04/26/2025    CONCFE 2 (L) 04/26/2025     Recent Labs     04/26/25  0007 04/27/25  0556   HGB 8.3* 7.4*     Noted on admission.  Due to grade D esophagitis, most likely.  Currently with vomiting blood prior to admission  Per Ganzoni equation, 704 mg iron deficit    Plan:  Transfuse goal greater than 7.0  We will give IV Venofer 300 mg IV x 3 days ending 4/28

## 2025-04-27 NOTE — PROGRESS NOTES
Progress Note - Hospitalist   Name: Irene Plascencia 64 y.o. female I MRN: [id number]  Unit/Bed#: [facility] 622-01 I Date of Admission: 4/25/2025   Date of Service: 4/27/2025 I Hospital Day: 1    Assessment & Plan  Intractable nausea and vomiting  Improving/resolving    Patient reports complaints of intractable nausea and vomiting which has since resolved with multiple rounds of Zofran in ER.  Also states has not had a bowel movement for 3 days.      Plan:  Continue to monitor symptoms for now  Sx controlled; no changes 4/26 1/27  Will make n.p.o. on evening of 4/27 in anticipation of EGD on 4/28, see below  Continue with PPI, Zofran as needed  Bowel regimen including senna daily and MiraLAX  Hematemesis  Patient reports having multiple episodes of hematemesis.  Has vomited at least twice in the ER with no bloody emesis noted  Hemoglobin has been around 8 which is baseline in the last 1 month   CT abdomen and pelvis notable for esophagitis similar to prior findings on EGD  EGD 1 month ago showed grade D esophagitis    Plan:  As hemoglobin is stable without further hematemesis and patient, monitor daily CBC  Advance diet to regulars  Plan for EGD on 4/28, Monday  N.p.o. on 4/27 night  Will defer need for EGD to GI  Anticipate after EGD, patient may be discharged same day or following day  Start Protonix IV push twice daily  Holding Celebrex/NSAIDs  Gastroesophageal reflux disease with esophagitis, Schreiber's esophagus and gastritis  Appears to have acute flare of known esophagitis    Plan:  Continue with IV PPI  Hold Celebrex  Continue with sucralfate 3 times daily with meals  Gastroparesis  Continue with Reglan 3 times daily with meals  Primary hypertension  Blood pressure currently stable  Blood Pressure: 106/65    Plan:  PRN fluid boluses   Schizoaffective disorder (HCC)  Continue with Seroquel, Paxil  Hyponatremia  Has history of chronic hyponatremia  Recent Labs     04/26/25  0007 04/26/25  1510 04/27/25  0556    SODIUM 126* 134* 139     Received 1 L fluid bolus  Repeat sodium to assess for further treatment  Check BMP this afternoon, then 6 AM  Other constipation  Daily MiraLAX and senna  Severe iron deficiency anemia   Lab Results   Component Value Date    IRON 14 (L) 04/26/2025    FERRITIN 7 (L) 04/26/2025    TIBC 625.8 (H) 04/26/2025    CONCFE 2 (L) 04/26/2025     Recent Labs     04/26/25  0007 04/27/25  0556   HGB 8.3* 7.4*     Noted on admission.  Due to grade D esophagitis, most likely.  Currently with vomiting blood prior to admission  Per Ganzoni equation, 704 mg iron deficit    Plan:  Transfuse goal greater than 7.0  We will give IV Venofer 300 mg IV x 3 days ending 4/28    VTE Pharmacologic Prophylaxis: VTE Score: 3 Moderate Risk (Score 3-4) - Pharmacological DVT Prophylaxis Contraindicated. Sequential Compression Devices Ordered.    Mobility:   Basic Mobility Inpatient Raw Score: 20  JH-HLM Goal: 6: Walk 10 steps or more  JH-HLM Achieved: 6: Walk 10 steps or more  JH-HLM Goal achieved. Continue to encourage appropriate mobility.    Patient Centered Rounds: I performed bedside rounds with nursing staff today.   Discussions with Specialists or Other Care Team Provider: GI    Education and Discussions with Family / Patient:  Plan to call partner Darrin Daniel for update; procedure/EGD for Monday.     Current Length of Stay: 1 day(s)  Current Patient Status: Inpatient   Certification Statement: The patient will continue to require additional inpatient hospital stay due to need for EGD for recent bloody emesis  Discharge Plan: Anticipate discharge in 48-72 hrs to home versus rehab  GI stated EGD will be Monday at earliest    Code Status: Level 1 - Full Code    Subjective   Patient states that she is slowly feeling better and better.  She states that her clear liquid breakfast was delicious this morning, but she felt that she had a reaction to the chicken broth which she attributes to MSG.  She described the side  effects as headache.  She denies fevers, chills, rash, shortness of breath.  Her nausea and vomiting are improving.  She has had no emesis today.  She has not felt much nausea and is looking forward to lunch.    Objective :  Temp:  [98.2 °F (36.8 °C)-98.3 °F (36.8 °C)] 98.2 °F (36.8 °C)  HR:  [82-95] 95  BP: ()/(60-65) 106/65  Resp:  [16-20] 16  SpO2:  [94 %-95 %] 95 %  O2 Device: None (Room air)    Body mass index is 32.34 kg/m².     Input and Output Summary (last 24 hours):     Intake/Output Summary (Last 24 hours) at 4/27/2025 0840  Last data filed at 4/27/2025 0513  Gross per 24 hour   Intake 1880 ml   Output --   Net 1880 ml       Physical Exam  Vitals reviewed.   Constitutional:       General: She is not in acute distress.     Appearance: She is obese. She is not ill-appearing.   Cardiovascular:      Rate and Rhythm: Normal rate and regular rhythm.      Heart sounds: No murmur heard.     No friction rub. No gallop.   Pulmonary:      Effort: Pulmonary effort is normal. No respiratory distress.      Breath sounds: No wheezing or rales.   Abdominal:      General: There is distension (obese).      Palpations: Abdomen is soft.      Tenderness: There is abdominal tenderness (epigastric, mild). There is no guarding.   Musculoskeletal:      Right lower leg: No edema.      Left lower leg: No edema.   Skin:     General: Skin is warm and dry.      Coloration: Skin is not pale.      Findings: No rash.   Neurological:      Mental Status: She is alert and oriented to person, place, and time.   Psychiatric:         Mood and Affect: Mood normal.         Behavior: Behavior normal.           Lines/Drains: Peripheral IV              Lab Results: I have reviewed the following results:   Results from last 7 days   Lab Units 04/27/25  0556   WBC Thousand/uL 2.89*   HEMOGLOBIN g/dL 7.4*   HEMATOCRIT % 26.9*   PLATELETS Thousands/uL 285   SEGS PCT % 45   LYMPHO PCT % 28   MONO PCT % 15*   EOS PCT % 10*     Results from last 7  days   Lab Units 04/27/25  0556 04/26/25  1510 04/26/25  0007   SODIUM mmol/L 139   < > 126*   POTASSIUM mmol/L 4.0   < > 3.9   CHLORIDE mmol/L 105   < > 93*   CO2 mmol/L 28   < > 24   BUN mg/dL 5   < > 10   CREATININE mg/dL 0.63   < > 0.54*   ANION GAP mmol/L 6   < > 9   CALCIUM mg/dL 9.0   < > 9.0   ALBUMIN g/dL  --   --  4.0   TOTAL BILIRUBIN mg/dL  --   --  0.38   ALK PHOS U/L  --   --  65   ALT U/L  --   --  13   AST U/L  --   --  35   GLUCOSE RANDOM mg/dL 98   < > 110    < > = values in this interval not displayed.                       Recent Cultures (last 7 days):         Imaging Results Review: No pertinent imaging studies reviewed.  Other Study Results Review: No additional pertinent studies reviewed.    Last 24 Hours Medication List:     Current Facility-Administered Medications:     [Held by provider] amLODIPine (NORVASC) tablet 5 mg, Daily    iron sucrose (VENOFER) 300 mg in sodium chloride 0.9 % 250 mL IVPB, Daily, Last Rate: Stopped (04/26/25 1656)    levothyroxine tablet 25 mcg, Daily    loratadine (CLARITIN) tablet 10 mg, Daily    LORazepam (ATIVAN) tablet 2 mg, Q6H PRN    metoclopramide (REGLAN) tablet 5 mg, TID AC    ondansetron (ZOFRAN) injection 4 mg, Q6H PRN    pantoprazole (PROTONIX) injection 40 mg, Q12H YUMIKO    PARoxetine (PAXIL) tablet 60 mg, Daily    polyethylene glycol (MIRALAX) packet 17 g, BID    potassium chloride (Klor-Con M20) CR tablet 40 mEq, 4x daily    QUEtiapine (SEROquel XR) 24 hr tablet 400 mg, HS    senna (SENOKOT) tablet 8.6 mg, Daily    sucralfate (CARAFATE) tablet 1 g, TID With Meals    Administrative Statements   Today, Patient Was Seen By: Roslyn Dillon MD  I have spent a total time of 40 minutes in caring for this patient on the day of the visit/encounter including Diagnostic results, Instructions for management, Patient and family education, Impressions, Counseling / Coordination of care, Documenting in the medical record, Reviewing/placing orders in the medical  record (including tests, medications, and/or procedures), Obtaining or reviewing history  , and Communicating with other healthcare professionals .    **Please Note: This note may have been constructed using a voice recognition system.**

## 2025-04-27 NOTE — PLAN OF CARE
Problem: PAIN - ADULT  Goal: Verbalizes/displays adequate comfort level or baseline comfort level  Description: Interventions:- Encourage patient to monitor pain and request assistance- Assess pain using appropriate pain scale- Administer analgesics based on type and severity of pain and evaluate response- Implement non-pharmacological measures as appropriate and evaluate response- Consider cultural and social influences on pain and pain management- Notify physician/advanced practitioner if interventions unsuccessful or patient reports new pain  Outcome: Progressing     Problem: INFECTION - ADULT  Goal: Absence or prevention of progression during hospitalization  Description: INTERVENTIONS:- Assess and monitor for signs and symptoms of infection- Monitor lab/diagnostic results- Monitor all insertion sites, i.e. indwelling lines, tubes, and drains- Monitor endotracheal if appropriate and nasal secretions for changes in amount and color- [place] appropriate cooling/warming therapies per order- Administer medications as ordered- Instruct and encourage patient and family to use good hand hygiene technique- Identify and instruct in appropriate isolation precautions for identified infection/condition  Outcome: Progressing  Goal: Absence of fever/infection during neutropenic period  Description: INTERVENTIONS:- Monitor WBC  Outcome: Progressing     Problem: SAFETY ADULT  Goal: Patient will remain free of falls  Description: INTERVENTIONS:- Educate patient/family on patient safety including physical limitations- Instruct patient to call for assistance with activity - Consult OT/PT to assist with strengthening/mobility - Keep Call bell within reach- Keep bed low and locked with side rails adjusted as appropriate- Keep care items and personal belongings within reach- Initiate and maintain comfort rounds- Make Fall Risk Sign visible to staff- Offer Toileting every 2 Hours, in advance of need- Initiate/Maintain bed alarm-  Obtain necessary fall risk management equipment:- Apply yellow socks and bracelet for high fall risk patients- Consider moving patient to room near nurses station  Outcome: Progressing  Goal: Maintain or return to baseline ADL function  Description: INTERVENTIONS:-  Assess patient's ability to carry out ADLs; assess patient's baseline for ADL function and identify physical deficits which impact ability to perform ADLs (bathing, care of mouth/teeth, toileting, grooming, dressing, etc.)- Assess/evaluate cause of self-care deficits - Assess range of motion- Assess patient's mobility; develop plan if impaired- Assess patient's need for assistive devices and provide as appropriate- Encourage maximum independence but intervene and supervise when necessary- Involve family in performance of ADLs- Assess for home care needs following discharge - Consider OT consult to assist with ADL evaluation and planning for discharge- Provide patient education as appropriate  Outcome: Progressing  Goal: Maintains/Returns to pre admission functional level  Description: INTERVENTIONS:- Perform AM-PAC 6 Click Basic Mobility/ Daily Activity assessment daily.- Set and communicate daily mobility goal to care team and patient/family/caregiver. - Collaborate with rehabilitation services on mobility goals if consulted- Perform Range of Motion 3 times a day.- Reposition patient every 2 hours.- Dangle patient 3 times a day- Stand patient 3 times a day- Ambulate patient 3 times a day- Out of bed to chair 3 times a day - Out of bed for meals 3 times a day- Out of bed for toileting- Record patient progress and toleration of activity level   Outcome: Progressing     Problem: DISCHARGE PLANNING  Goal: Discharge to home or other facility with appropriate resources  Description: INTERVENTIONS:- Identify barriers to discharge w/patient and caregiver- Arrange for needed discharge resources and transportation as appropriate- Identify discharge learning needs  (meds, wound care, etc.)- Arrange for interpretive services to assist at discharge as needed- Refer to Case Management Department for coordinating discharge planning if the patient needs post-hospital services based on physician/advanced practitioner order or complex needs related to functional status, cognitive ability, or social support system  Outcome: Progressing     Problem: Knowledge Deficit  Goal: Patient/family/caregiver demonstrates understanding of disease process, treatment plan, medications, and discharge instructions  Description: Complete learning assessment and assess knowledge base.Interventions:- Provide teaching at level of understanding- Provide teaching via preferred learning methods  Outcome: Progressing     Problem: Prexisting or High Potential for Compromised Skin Integrity  Goal: Skin integrity is maintained or improved  Description: INTERVENTIONS:- Identify patients at risk for skin breakdown- Assess and monitor skin integrity- Assess and monitor nutrition and hydration status- Monitor labs - Assess for incontinence - Turn and reposition patient- Assist with mobility/ambulation- Relieve pressure over bony prominences- Avoid friction and shearing- Provide appropriate hygiene as needed including keeping skin clean and dry- Evaluate need for skin moisturizer/barrier cream- Collaborate with interdisciplinary team - Patient/family teaching- Consider wound care consult   Outcome: Progressing

## 2025-04-28 ENCOUNTER — ANESTHESIA (INPATIENT)
Dept: GASTROENTEROLOGY | Facility: HOSPITAL | Age: 65
DRG: 369 | End: 2025-04-28
Payer: COMMERCIAL

## 2025-04-28 ENCOUNTER — ANESTHESIA EVENT (INPATIENT)
Dept: GASTROENTEROLOGY | Facility: HOSPITAL | Age: 65
DRG: 369 | End: 2025-04-28
Payer: COMMERCIAL

## 2025-04-28 ENCOUNTER — APPOINTMENT (INPATIENT)
Dept: GASTROENTEROLOGY | Facility: HOSPITAL | Age: 65
DRG: 369 | End: 2025-04-28
Attending: STUDENT IN AN ORGANIZED HEALTH CARE EDUCATION/TRAINING PROGRAM
Payer: COMMERCIAL

## 2025-04-28 PROBLEM — D62 ACUTE BLOOD LOSS ANEMIA (ABLA): Status: ACTIVE | Noted: 2025-04-28

## 2025-04-28 LAB
ANION GAP SERPL CALCULATED.3IONS-SCNC: 7 MMOL/L (ref 4–13)
BASOPHILS # BLD AUTO: 0.02 THOUSANDS/ÂΜL (ref 0–0.1)
BASOPHILS NFR BLD AUTO: 1 % (ref 0–1)
BUN SERPL-MCNC: 6 MG/DL (ref 5–25)
CALCIUM SERPL-MCNC: 8.9 MG/DL (ref 8.4–10.2)
CHLORIDE SERPL-SCNC: 103 MMOL/L (ref 96–108)
CO2 SERPL-SCNC: 27 MMOL/L (ref 21–32)
CREAT SERPL-MCNC: 0.65 MG/DL (ref 0.6–1.3)
EOSINOPHIL # BLD AUTO: 0.23 THOUSAND/ÂΜL (ref 0–0.61)
EOSINOPHIL NFR BLD AUTO: 8 % (ref 0–6)
ERYTHROCYTE [DISTWIDTH] IN BLOOD BY AUTOMATED COUNT: 19.9 % (ref 11.6–15.1)
GFR SERPL CREATININE-BSD FRML MDRD: 94 ML/MIN/1.73SQ M
GLUCOSE SERPL-MCNC: 86 MG/DL (ref 65–140)
HCT VFR BLD AUTO: 24.6 % (ref 34.8–46.1)
HGB BLD-MCNC: 6.9 G/DL (ref 11.5–15.4)
IMM GRANULOCYTES # BLD AUTO: 0.03 THOUSAND/UL (ref 0–0.2)
IMM GRANULOCYTES NFR BLD AUTO: 1 % (ref 0–2)
LYMPHOCYTES # BLD AUTO: 0.67 THOUSANDS/ÂΜL (ref 0.6–4.47)
LYMPHOCYTES NFR BLD AUTO: 23 % (ref 14–44)
MCH RBC QN AUTO: 20.5 PG (ref 26.8–34.3)
MCHC RBC AUTO-ENTMCNC: 28 G/DL (ref 31.4–37.4)
MCV RBC AUTO: 73 FL (ref 82–98)
MONOCYTES # BLD AUTO: 0.34 THOUSAND/ÂΜL (ref 0.17–1.22)
MONOCYTES NFR BLD AUTO: 12 % (ref 4–12)
NEUTROPHILS # BLD AUTO: 1.61 THOUSANDS/ÂΜL (ref 1.85–7.62)
NEUTS SEG NFR BLD AUTO: 55 % (ref 43–75)
NRBC BLD AUTO-RTO: 2 /100 WBCS
PLATELET # BLD AUTO: 259 THOUSANDS/UL (ref 149–390)
PMV BLD AUTO: 10.2 FL (ref 8.9–12.7)
POTASSIUM SERPL-SCNC: 4.5 MMOL/L (ref 3.5–5.3)
RBC # BLD AUTO: 3.36 MILLION/UL (ref 3.81–5.12)
SODIUM SERPL-SCNC: 137 MMOL/L (ref 135–147)
WBC # BLD AUTO: 2.9 THOUSAND/UL (ref 4.31–10.16)

## 2025-04-28 PROCEDURE — 97167 OT EVAL HIGH COMPLEX 60 MIN: CPT

## 2025-04-28 PROCEDURE — 97163 PT EVAL HIGH COMPLEX 45 MIN: CPT

## 2025-04-28 PROCEDURE — 88305 TISSUE EXAM BY PATHOLOGIST: CPT | Performed by: STUDENT IN AN ORGANIZED HEALTH CARE EDUCATION/TRAINING PROGRAM

## 2025-04-28 PROCEDURE — 85025 COMPLETE CBC W/AUTO DIFF WBC: CPT

## 2025-04-28 PROCEDURE — P9016 RBC LEUKOCYTES REDUCED: HCPCS

## 2025-04-28 PROCEDURE — 0DB78ZX EXCISION OF STOMACH, PYLORUS, VIA NATURAL OR ARTIFICIAL OPENING ENDOSCOPIC, DIAGNOSTIC: ICD-10-PCS | Performed by: INTERNAL MEDICINE

## 2025-04-28 PROCEDURE — 43239 EGD BIOPSY SINGLE/MULTIPLE: CPT | Performed by: INTERNAL MEDICINE

## 2025-04-28 PROCEDURE — 99232 SBSQ HOSP IP/OBS MODERATE 35: CPT

## 2025-04-28 PROCEDURE — 80048 BASIC METABOLIC PNL TOTAL CA: CPT

## 2025-04-28 PROCEDURE — 30233N1 TRANSFUSION OF NONAUTOLOGOUS RED BLOOD CELLS INTO PERIPHERAL VEIN, PERCUTANEOUS APPROACH: ICD-10-PCS | Performed by: STUDENT IN AN ORGANIZED HEALTH CARE EDUCATION/TRAINING PROGRAM

## 2025-04-28 RX ORDER — ONDANSETRON 4 MG/1
4 TABLET, FILM COATED ORAL EVERY 8 HOURS PRN
Qty: 20 TABLET | Refills: 0 | Status: CANCELLED | OUTPATIENT
Start: 2025-04-28

## 2025-04-28 RX ORDER — PROPOFOL 10 MG/ML
INJECTION, EMULSION INTRAVENOUS CONTINUOUS PRN
Status: DISCONTINUED | OUTPATIENT
Start: 2025-04-28 | End: 2025-04-28

## 2025-04-28 RX ORDER — LIDOCAINE HYDROCHLORIDE 10 MG/ML
INJECTION, SOLUTION EPIDURAL; INFILTRATION; INTRACAUDAL; PERINEURAL AS NEEDED
Status: DISCONTINUED | OUTPATIENT
Start: 2025-04-28 | End: 2025-04-28

## 2025-04-28 RX ORDER — SODIUM CHLORIDE 9 MG/ML
INJECTION, SOLUTION INTRAVENOUS CONTINUOUS PRN
Status: DISCONTINUED | OUTPATIENT
Start: 2025-04-28 | End: 2025-04-28

## 2025-04-28 RX ORDER — ACETAMINOPHEN 325 MG/1
975 TABLET ORAL EVERY 8 HOURS PRN
Status: DISCONTINUED | OUTPATIENT
Start: 2025-04-28 | End: 2025-04-29 | Stop reason: HOSPADM

## 2025-04-28 RX ORDER — PROPOFOL 10 MG/ML
INJECTION, EMULSION INTRAVENOUS AS NEEDED
Status: DISCONTINUED | OUTPATIENT
Start: 2025-04-28 | End: 2025-04-28

## 2025-04-28 RX ORDER — SODIUM CHLORIDE, SODIUM LACTATE, POTASSIUM CHLORIDE, CALCIUM CHLORIDE 600; 310; 30; 20 MG/100ML; MG/100ML; MG/100ML; MG/100ML
125 INJECTION, SOLUTION INTRAVENOUS CONTINUOUS
Status: CANCELLED | OUTPATIENT
Start: 2025-04-28

## 2025-04-28 RX ADMIN — PANTOPRAZOLE SODIUM 40 MG: 40 INJECTION, POWDER, FOR SOLUTION INTRAVENOUS at 09:16

## 2025-04-28 RX ADMIN — PROPOFOL 20 MG: 10 INJECTION, EMULSION INTRAVENOUS at 12:10

## 2025-04-28 RX ADMIN — PROPOFOL 120 MCG/KG/MIN: 10 INJECTION, EMULSION INTRAVENOUS at 12:07

## 2025-04-28 RX ADMIN — LIDOCAINE HYDROCHLORIDE 100 MG: 10 INJECTION, SOLUTION EPIDURAL; INFILTRATION; INTRACAUDAL; PERINEURAL at 12:07

## 2025-04-28 RX ADMIN — PROPOFOL 20 MG: 10 INJECTION, EMULSION INTRAVENOUS at 12:14

## 2025-04-28 RX ADMIN — PAROXETINE HYDROCHLORIDE 60 MG: 20 TABLET, FILM COATED ORAL at 07:34

## 2025-04-28 RX ADMIN — SODIUM CHLORIDE: 0.9 INJECTION, SOLUTION INTRAVENOUS at 11:58

## 2025-04-28 RX ADMIN — LEVOTHYROXINE SODIUM 25 MCG: 0.03 TABLET ORAL at 07:34

## 2025-04-28 RX ADMIN — METOCLOPRAMIDE 5 MG: 5 TABLET ORAL at 07:34

## 2025-04-28 RX ADMIN — METOCLOPRAMIDE 5 MG: 5 TABLET ORAL at 18:17

## 2025-04-28 RX ADMIN — IRON SUCROSE 300 MG: 20 INJECTION, SOLUTION INTRAVENOUS at 18:17

## 2025-04-28 RX ADMIN — PROPOFOL 70 MG: 10 INJECTION, EMULSION INTRAVENOUS at 12:07

## 2025-04-28 RX ADMIN — ACETAMINOPHEN 975 MG: 325 TABLET, FILM COATED ORAL at 15:01

## 2025-04-28 RX ADMIN — POLYETHYLENE GLYCOL 3350 17 G: 17 POWDER, FOR SOLUTION ORAL at 21:45

## 2025-04-28 RX ADMIN — QUETIAPINE 400 MG: 200 TABLET, FILM COATED, EXTENDED RELEASE ORAL at 21:44

## 2025-04-28 RX ADMIN — SUCRALFATE 1 G: 1 TABLET ORAL at 18:17

## 2025-04-28 RX ADMIN — PANTOPRAZOLE SODIUM 40 MG: 40 INJECTION, POWDER, FOR SOLUTION INTRAVENOUS at 21:44

## 2025-04-28 NOTE — ANESTHESIA POSTPROCEDURE EVALUATION
Post-Op Assessment Note    CV Status:  Stable    Pain management: adequate       Mental Status:  Alert and awake   Hydration Status:  Euvolemic   PONV Controlled:  Controlled   Airway Patency:  Patent     Post Op Vitals Reviewed: Yes    No anethesia notable event occurred.    Staff: CRNA           Last Filed PACU Vitals:  Vitals Value Taken Time   Temp 98.5 °F (36.9 °C) 04/28/25 1225   Pulse 92 04/28/25 1225   BP 91/53 04/28/25 1225   Resp 18 04/28/25 1225   SpO2 98 % 04/28/25 1225       Modified Katlyn:     Vitals Value Taken Time   Activity 2 04/28/25 1227   Respiration 2 04/28/25 1227   Circulation 2 04/28/25 1227   Consciousness 1 04/28/25 1227   Oxygen Saturation 1 04/28/25 1227     Modified Katlyn Score: 8

## 2025-04-28 NOTE — CASE MANAGEMENT
Case Management Discharge Planning Note    Patient name Irene Plascencia  Location [facility] 622/[facility] 622-01 MRN [id number]  : 1960 Date 2025       Current Admission Date: 2025  Current Admission Diagnosis:Intractable nausea and vomiting   Patient Active Problem List    Diagnosis Date Noted Date Diagnosed    Problems related to living alone 2025     Lack of social support 2025     Generalized weakness 2025     Ambulatory dysfunction 2025     Leukopenia 2024     Heartburn 2024     Gastroparesis 2024     Elevated vitamin B12 level 2024     Iron deficiency 2024     Acquired absence of other toe(s), unspecified side (HCC) 2024     Osteomyelitis, unspecified site, unspecified type (HCC) 2024     Vitamin B12 deficiency (dietary) anemia 2023     Folate deficiency 2023     Neutropenia (HCC) 2023     Post concussion syndrome 2023     Sepsis without acute organ dysfunction (HCC) 2023     Headache 2023     Nutritional anemia 2023     Stress incontinence 2023     Ulcer of toe, chronic, left, with unspecified severity (HCC) 2023     Thrombocytosis 2022     Abdominal pain 2022     Schreiber's esophagus 2022     Microcytic anemia 2022     Self neglect 2022     Pruritus 2022     Hematemesis 01/10/2022     Intractable nausea and vomiting 10/22/2021     Hyperlipidemia 2021     Word finding difficulty 2021     Hiatal hernia with GERD without esophagitis 2021     Polyp of colon 2021     Melena 2020     Cellulitis of left upper extremity 2020     Gastroesophageal reflux disease with esophagitis, Schreiber's esophagus and gastritis 2020     Rash 2020     Iron deficiency anemia 2020     Multiple excoriations 2020     Dizziness 2020     Fall 2020     Esophagitis 2020     Problem related to  living arrangement 01/27/2020     Hypokalemia      NMS (neuroleptic malignant syndrome) 01/03/2020     Elevated lactic acid level 01/03/2020     Preop exam for internal medicine 11/18/2019     Severe iron deficiency anemia  11/14/2019     Chronic bilateral low back pain without sciatica 11/01/2019     Right hip pain 07/15/2019     Primary hypertension 06/12/2019     Dermal hypersensitivity reaction 11/08/2018     Psychiatric disorder 08/21/2018     Schizoaffective disorder (HCC) 08/16/2018     Serotonin syndrome 08/13/2018     Other fatigue 06/19/2018     Spinal stenosis of lumbar region with neurogenic claudication 06/15/2018     Lumbar spondylosis 06/15/2018     T12 compression fracture (HCC) 06/15/2018     Synovial cyst of lumbar facet joint 06/15/2018     Localized osteoporosis with current pathological fracture with routine healing 05/25/2018     Lumbar radiculopathy 03/19/2018     DDD (degenerative disc disease), lumbar 03/19/2018     Spondylolisthesis at L4-L5 level 03/19/2018     Anxiety 10/31/2016     Acquired hypothyroidism 10/31/2016     Other constipation 04/10/2014     Bulimia nervosa in remission 03/19/2014       LOS (days): 2  Geometric Mean LOS (GMLOS) (days): 3  Days to GMLOS:0.5     OBJECTIVE:  Risk of Unplanned Readmission Score: 24.49   Current admission status: Inpatient   Preferred Pharmacy:   [facility]/pharmacy #[id number] - [place], [place] - [address]  [address] [place] [address]  Phone: [phone] Fax: [phone]    Primary Care Provider: Raheem Cain MD    [email or web address]  Secondary Insurance:     DISCHARGE DETAILS:    Discharge planning discussed with:: patient  Freedom of Choice: Yes  Comments - Freedom of Choice: Discussed FOC  CM contacted family/caregiver?: No- see comments  Were Treatment Team discharge recommendations reviewed with patient/caregiver?: Yes  Did patient/caregiver verbalize understanding of patient  care needs?: Yes  Were patient/caregiver advised of the risks associated with not following Treatment Team discharge recommendations?: Yes    Requested Home Health Care         Is the patient interested in HHC at discharge?: Yes  Home Health Discipline requested:: Occupational Therapy, Physical Therapy  Home Health Agency Name:: [facility]  Home Health Follow-Up Provider:: PCP  Home Health Services Needed:: Gait/ADL Training, Evaluate Functional Status and Safety, Strengthening/Theraputic Exercises to Improve Function  Homebound Criteria Met:: Requires the Assistance of Another Person for Safe Ambulation or to Leave the Home, Uses an Assist Device (i.e. cane, walker, etc)  Supporting Clincal Findings:: Limited Endurance, Fatigues Easliy in Short Distances    Other Referral/Resources/Interventions Provided:  Interventions: HHC  Referral Comments: This CM discussed therapy recs with pt. [facility] in agreement with dcp.    Treatment Team Recommendation: Home with Home Health Care  Discharge Destination Plan:: Home with Home Health Care

## 2025-04-28 NOTE — ASSESSMENT & PLAN NOTE
Patient reports complaints of intractable nausea and vomiting which has since resolved with multiple rounds of Zofran in ER.  Also states has not had a bowel movement for 3 days.  Also reported hematemesis, see related plan   GI consulted, EGD on 4/28 with grade D esophagitis.  Biopsies taken to r/o H pylori.  Rec PPI BID, antiemetic, carafate.  Should have surgery f/u to evaluate for hiatal hernia repair  Tolerating diet at this time.  Stable for d/c  Bowel regimen prn

## 2025-04-28 NOTE — PLAN OF CARE
Problem: OCCUPATIONAL THERAPY ADULT  Goal: Performs self-care activities at highest level of function for planned discharge setting.  See evaluation for individualized goals.  Description: Treatment Interventions: ADL retraining, Functional transfer training, UE strengthening/ROM, Endurance training, Patient/family training, Equipment evaluation/education, Compensatory technique education, Continued evaluation, Energy conservation, Activityengagement          See flowsheet documentation for full assessment, interventions and recommendations.   Note: Limitation: Decreased ADL status, Decreased endurance, Decreased self-care trans, Decreased high-level ADLs  Prognosis: Fair  Assessment: Pt is a 65 yo female who presented to [facility] with vomiting blood and nausea in which pt dx w/ intractable nausea and vomiting. Pt  has a past medical history of Anemia, Anxiety, Arthritis, Back pain at L4-L5 level, Schreiber esophagus, Bulimia, Colon polyp, Disease of thyroid gland, GERD (gastroesophageal reflux disease), Hearing loss in left ear, History of transfusion, Hyperlipidemia, Hypertension, Hypothyroidism, Leukopenia, NMS (neuroleptic malignant syndrome) (01/03/2020), Pneumonia, RA (rheumatoid arthritis) (HCC), Sciatica, Seizure (HCC), Skin spots, red, Synovial cyst of lumbar spine, Vertigo, Wears dentures, and Weight gain. Pt with active OT orders in which OT consulted to assess pt's functional status and occupational performance to determine safe d/c needs. Pt seen with PT to increase safety, decrease fall risk, and maximize functional/occupational performance 2* medical complexity which is a regression from pt's functional baseline. Pt lives alone in a 1  ranch home. PTA, pt was independent in ADLs, has some assistance with IADLs, and uses RW vs SPC for functional mobility. (-) driving. Currently, pt performing bed mobility @ supervision level, functional transfers @ supervision level w/ no DME, functional mobility w/  varying Min A x1 w/ HHA, UB ADLs @ supervision level, and LB ADLs w/ Min A. Pt demonstrates the following limitations/impairments which impact the pt's ability to engage in valued occupations: balance, endurance/activity tolerance, standing tolerance, functional reach, postural/trunk control, strength, safety awareness, and pain. From an OT standpoint, recommend discharge w/ Level 3 resources once medically stable. The patient's raw score on the -PAC Daily Activity Inpatient Short Form is 20. A raw score of greater than or equal to 19 suggests the patient may benefit from discharge to home. Please refer to the recommendation of the Occupational Therapist for safe discharge planning.  Pt would benefit from skilled OT services 2-3x/wk to address acute care needs and underlying performance skills to promote safety, decrease fall risk, and enhance occupational performance to return to PLOF. Goals to be met within the next 10-14 days.     Rehab Resource Intensity Level, OT: III (Minimum Resource Intensity)

## 2025-04-28 NOTE — PROGRESS NOTES
Progress Note - Hospitalist   Name: Irene Plascencia 64 y.o. female I MRN: [id number]  Unit/Bed#: [facility] 622-01 I Date of Admission: 4/25/2025   Date of Service: 4/28/2025 I Hospital Day: 2    Assessment & Plan  Intractable nausea and vomiting  Improving/resolving.    Patient reports complaints of intractable nausea and vomiting which has since resolved with multiple rounds of Zofran in ER.  Also states has not had a bowel movement for 3 days.    Plan:  Continue to monitor symptoms for now  Sx controlled; no changes 4/26-4/28 re: symptom meds  EGD 4/28 w GI; anticipate dc to home once nausea and po intake improve   Continue with PPI, Zofran as needed  Bowel regimen including senna daily and MiraLAX  Hematemesis  Patient reports having multiple episodes of hematemesis.  Has vomited at least twice in the ER with no bloody emesis noted  Hemoglobin has been around 8 which is baseline in the last 1 month   CT abdomen and pelvis notable for esophagitis similar to prior findings on EGD  EGD 1 month ago showed grade D esophagitis    Plan:  As hemoglobin is stable without further hematemesis and patient, monitor daily CBC  Advance diet to regulars  Plan for EGD  Continue Protonix IV push twice daily; continue oral twice daily on discharge for total of 14 days, ending May 9th  Holding Celebrex/NSAIDs, recommend avoiding these on discharge as well  Gastroesophageal reflux disease with esophagitis, Schreiber's esophagus and gastritis  Appears to have acute flare of known esophagitis    Plan:  Will be discharged on twice daily PPI until midnight, followed by daily  Hold Celebrex  Continue with sucralfate 3 times daily with meals  Gastroparesis  Continue with Reglan 3 times daily with meals  Primary hypertension  Blood pressure currently stable  Blood Pressure: 115/69    Plan:  PRN fluid boluses   Severe iron deficiency anemia   Lab Results   Component Value Date    IRON 14 (L) 04/26/2025    FERRITIN 7 (L) 04/26/2025    TIBC  625.8 (H) 04/26/2025    CONCFE 2 (L) 04/26/2025     Recent Labs     04/26/25  0007 04/27/25  0556 04/28/25  0516   HGB 8.3* 7.4* 6.9*     Noted on admission.  Due to grade D esophagitis, most likely.  Currently with vomiting blood prior to admission  Per Ganzoni equation, 704 mg iron deficit    Plan:  Transfuse goal greater than 7.0  We will give IV Venofer 300 mg IV x 3 days ending 4/28  Schizoaffective disorder (HCC)  Continue with Seroquel, Paxil  Hyponatremia (Resolved: 4/28/2025)  Has history of chronic hyponatremia  Recent Labs     04/26/25  1510 04/27/25  0556 04/28/25  0516   SODIUM 134* 139 137     Received 1 L fluid bolus  Likely due to fluid losses from emesis as primary issue  Repeat sodium to assess for further treatment    Other constipation  Daily MiraLAX and senna  Acute blood loss anemia (ABLA)  Due to hematemesis. Iron deficiency largely driven by blood loss from grade D esophaguitis as described elsewhere.    Recent Labs     04/26/25  0007 04/27/25  0556 04/28/25  0516   HGB 8.3* 7.4* 6.9*       Plan:  S/p 1u pRBC on 4/28  Transfuse >7.0 goal  Continue BID PPI  Continue sucralfate  Venofer as above    VTE Pharmacologic Prophylaxis: VTE Score: 3 Moderate Risk (Score 3-4) - Pharmacological DVT Prophylaxis Contraindicated. Sequential Compression Devices Ordered.    Mobility:   Basic Mobility Inpatient Raw Score: 20  JH-HLM Goal: 6: Walk 10 steps or more  JH-HLM Achieved: 6: Walk 10 steps or more  JH-HLM Goal achieved. Continue to encourage appropriate mobility.    Patient Centered Rounds: I performed bedside rounds with nursing staff today.   Discussions with Specialists or Other Care Team Provider: GI    Education and Discussions with Family / Patient:  Plan to call partner Darrin Daniel for update; procedure/EGD for Monday.     Current Length of Stay: 2 day(s)  Current Patient Status: Inpatient   Certification Statement: The patient will continue to require additional inpatient hospital stay due to  need for EGD for recent bloody emesis  Discharge Plan: Anticipate discharge later today or tomorrow to discharge location to be determined pending rehab evaluations. PTOT ordered as patient has not mobilized much  GI stated EGD will be Monday at earliest    Code Status: Level 1 - Full Code    Subjective   This morning, receiving 1 unit PRBC preprocedure per GI.    No new complaints.  States her nausea and vomiting is improved but had x1 nausea/vomiting episode again this AM. Appetite is slightly better, feels hungry. She denies any abdominal pain, fevers, chills.    Objective :  Temp:  [97.8 °F (36.6 °C)-98.3 °F (36.8 °C)] 98 °F (36.7 °C)  HR:  [85-94] 94  BP: ()/(58-74) 115/69  Resp:  [16-17] 17  SpO2:  [93 %-98 %] 93 %  O2 Device: None (Room air)    Body mass index is 32.34 kg/m².     Input and Output Summary (last 24 hours):     Intake/Output Summary (Last 24 hours) at 4/28/2025 0831  Last data filed at 4/27/2025 2200  Gross per 24 hour   Intake 2056.05 ml   Output --   Net 2056.05 ml       Physical Exam  Vitals reviewed.   Constitutional:       General: She is not in acute distress.     Appearance: She is obese. She is not ill-appearing.   Cardiovascular:      Rate and Rhythm: Normal rate and regular rhythm.      Heart sounds: No murmur heard.     No friction rub. No gallop.   Pulmonary:      Effort: Pulmonary effort is normal. No respiratory distress.      Breath sounds: No wheezing or rales.   Abdominal:      General: There is distension (obese).      Palpations: Abdomen is soft.      Tenderness: There is abdominal tenderness (epigastric, mild). There is no guarding.   Musculoskeletal:      Right lower leg: No edema.      Left lower leg: No edema.   Skin:     General: Skin is warm and dry.      Coloration: Skin is not pale.      Findings: No rash.   Neurological:      Mental Status: She is alert and oriented to person, place, and time.   Psychiatric:         Mood and Affect: Mood normal.          Behavior: Behavior normal.           Lines/Drains: Peripheral IV              Lab Results: I have reviewed the following results:   Results from last 7 days   Lab Units 04/28/25  0516   WBC Thousand/uL 2.90*   HEMOGLOBIN g/dL 6.9*   HEMATOCRIT % 24.6*   PLATELETS Thousands/uL 259   SEGS PCT % 55   LYMPHO PCT % 23   MONO PCT % 12   EOS PCT % 8*     Results from last 7 days   Lab Units 04/28/25  0516 04/26/25  1510 04/26/25  0007   SODIUM mmol/L 137   < > 126*   POTASSIUM mmol/L 4.5   < > 3.9   CHLORIDE mmol/L 103   < > 93*   CO2 mmol/L 27   < > 24   BUN mg/dL 6   < > 10   CREATININE mg/dL 0.65   < > 0.54*   ANION GAP mmol/L 7   < > 9   CALCIUM mg/dL 8.9   < > 9.0   ALBUMIN g/dL  --   --  4.0   TOTAL BILIRUBIN mg/dL  --   --  0.38   ALK PHOS U/L  --   --  65   ALT U/L  --   --  13   AST U/L  --   --  35   GLUCOSE RANDOM mg/dL 86   < > 110    < > = values in this interval not displayed.                       Recent Cultures (last 7 days):         Imaging Results Review: No pertinent imaging studies reviewed.  Other Study Results Review: No additional pertinent studies reviewed.    Last 24 Hours Medication List:     Current Facility-Administered Medications:     [Held by provider] amLODIPine (NORVASC) tablet 5 mg, Daily    iron sucrose (VENOFER) 300 mg in sodium chloride 0.9 % 250 mL IVPB, Daily, Last Rate: Stopped (04/27/25 1053)    levothyroxine tablet 25 mcg, Daily    loratadine (CLARITIN) tablet 10 mg, Daily    LORazepam (ATIVAN) tablet 2 mg, Q6H PRN    metoclopramide (REGLAN) tablet 5 mg, TID AC    ondansetron (ZOFRAN) injection 4 mg, Q6H PRN    pantoprazole (PROTONIX) injection 40 mg, Q12H YUMIKO    PARoxetine (PAXIL) tablet 60 mg, Daily    polyethylene glycol (MIRALAX) packet 17 g, BID    QUEtiapine (SEROquel XR) 24 hr tablet 400 mg, HS    senna (SENOKOT) tablet 8.6 mg, Daily    sucralfate (CARAFATE) tablet 1 g, TID With Meals    Administrative Statements   Today, Patient Was Seen By: Roslyn Dillon MD  I have  spent a total time of 40 minutes in caring for this patient on the day of the visit/encounter including Diagnostic results, Instructions for management, Patient and family education, Impressions, Counseling / Coordination of care, Documenting in the medical record, Reviewing/placing orders in the medical record (including tests, medications, and/or procedures), Obtaining or reviewing history  , and Communicating with other healthcare professionals .    **Please Note: This note may have been constructed using a voice recognition system.**

## 2025-04-28 NOTE — OCCUPATIONAL THERAPY NOTE
Occupational Therapy Evaluation     Patient Name: Irene Plascencia  Today's Date: 4/28/2025  Problem List  Principal Problem:    Intractable nausea and vomiting  Active Problems:    Other constipation    Schizoaffective disorder (HCC)    Primary hypertension    Severe iron deficiency anemia     Hematemesis    Microcytic anemia    Gastroparesis    Gastroesophageal reflux disease with esophagitis, Schreiber's esophagus and gastritis    Past Medical History  Past Medical History:   Diagnosis Date    Anemia     Anxiety     Arthritis     Back pain at L4-L5 level     Schreiber esophagus     Bulimia     Colon polyp     Disease of thyroid gland     hypothyroid    GERD (gastroesophageal reflux disease)     Hearing loss in left ear     History of transfusion     Hyperlipidemia     Hypertension     Hypothyroidism     Leukopenia     NMS (neuroleptic malignant syndrome) 01/03/2020    Pneumonia     RA (rheumatoid arthritis) ([facility])     in feet    Sciatica     Seizure ([facility])     due to medication mix up 8/2018 only one historically    Skin spots, red     lower right arm - poss from cat- no s/s infection    Synovial cyst of lumbar spine     Vertigo     Wears dentures     full set    Weight gain      Past Surgical History  Past Surgical History:   Procedure Laterality Date    BONE MARROW BIOPSY      BREAST SURGERY      enlargement with implants    CLOSED REDUCTION DISTAL FEMUR FRACTURE      COLONOSCOPY      COSMETIC SURGERY      DENTAL SURGERY      HIP ARTHROPLASTY Right 01/02/2020    Procedure: REVISION TOTAL HIP ARTHROPLASTY WITH REPAIR OF PARIPROSTHETIC FRACTURE - POSTERIOR APPROACH;  Surgeon: Dilan Pedersen MD;  Location:  MAIN OR;  Service: Orthopedics    KY AMPUTATION METATARSAL W/TOE SINGLE Left 04/07/2023    Procedure: AMPUTATION TOE 4th;  Surgeon: Conner Bartlett DPM;  Location:  MAIN OR;  Service: Podiatry    [place] ARTHRP ACETBLR/PROX FEM PROSTC AGRFT/ALGRFT Right 12/11/2019    Procedure: ARTHROPLASTY HIP TOTAL  ANTERIOR;  Surgeon: Dilan Pedersen MD;  [facility];  Service: Orthopedics    CT ESOPHAGOGASTRODUODENOSCOPY TRANSORAL DIAGNOSTIC N/A 05/11/2021    Procedure: ESOPHAGOGASTRODUODENOSCOPY (EGD);  Surgeon: David Cedeño MD;  [facility];  Service: General    CT LAPS RPR PARAESPHGL HRNA INCL FUNDPLSTY W/MESH N/A 05/11/2021    Procedure: REPAIR HERNIA PARAESOPHAGEAL  LAPAROSCOPIC;  Surgeon: David Cedeño MD;  [facility];  Service: General    CT UNLISTED PROCEDURE ESOPHAGUS N/A 05/11/2021    Procedure: FUNDOPLICATION TRANSORAL INCISIONLESS;  Surgeon: Brad Cadet MD;  [facility];  Service: Gastroenterology    RHINOPLASTY      SINUS SURGERY      TOE AMPUTATION Left     2023 4th toe    TRANSORAL INCISIONLESS FUNDOPLICATION (TIF)  05/11/2021 04/28/25 1048   OT Last Visit   OT Visit Date 04/28/25   Note Type   Note type Evaluation   Additional Comments Pt seen w/ PT to increase safety, decrease fall risk, and maximize functional/occupational performance 2* medical complexity which is a regression from pt's functional baseline.   Pain Assessment   Pain Assessment Tool 0-10   Pain Score No Pain   Hospital Pain Intervention(s) Repositioned;Ambulation/increased activity;Emotional support   Restrictions/Precautions   Weight Bearing Precautions Per Order No   Other Precautions Chair Alarm;Bed Alarm;Multiple lines;Telemetry;Fall Risk   Home Living   Type of Home House  (1 story ranch home)   Home Layout One level;Performs ADLs on one level;Able to live on main level with bedroom/bathroom;Access   Bathroom Shower/Tub Tub/shower unit  (Cut out tub shower)   Bathroom Toilet Standard   Bathroom Equipment Grab bars in shower   Bathroom Accessibility Accessible   Home Equipment Walker;Cane   Additional Comments Pt reports living alone in a 1 story ranch home; has RW vs SPC for functional mobility; reporting that she is hoping to move into a senior apartment within the next month   Prior  "Function   Level of [place] Independent with ADLs;Independent with functional mobility;Independent with IADLS;Needs assistance with IADLS   Lives With Alone  (Reports that her significant other lives in [facility])   Receives Help From Family;Friend(s);Neighbor   IADLs Family/Friend/Other provides transportation;Independent with meal prep;Independent with medication management   Falls in the last 6 months 0   Vocational Retired   Comments (-) driving reporting that her friends/neigbor will assist with transportation needs or that she uses [employer]   Lifestyle   Autonomy PTA, pt was independent in ADLs, has some assistance with IADLs, and uses RW vs SPC for functional mobility; (-) driving   Reciprocal Relationships Lives alone; reports that her significant other lives in [facility]   Service to Others Retired   Intrinsic Gratification Enjoys watching TV   Subjective   Subjective \"Feels good to be up moving.\"   ADL   Where Assessed Edge of bed   Eating Assistance 5  Supervision/Setup   Grooming Assistance 5  Supervision/Setup   UB Bathing Assistance 5  Supervision/Setup   LB Bathing Assistance 4  Minimal Assistance   UB Dressing Assistance 5  Supervision/Setup   LB Dressing Assistance 4  Minimal Assistance   Toileting Assistance  4  Minimal Assistance   Functional Assistance 4  Minimal Assistance   Bed Mobility   Supine to Sit Unable to assess   Sit to Supine 5  Supervision   Additional items HOB elevated;Bedrails;Increased time required;Verbal cues   Additional Comments Upon arrival, pt found sitting upright on EOB; @ end of session, pt left lying supine in bed with all functional needs in reach and bed alarm activated with RN present, reporting pt is going down for EGD shortly   Transfers   Sit to Stand 5  Supervision   Additional items Increased time required;Verbal cues   Stand to Sit 5  Supervision   Additional items Increased time required;Verbal cues   Additional Comments w/ no DME   Functional Mobility   Functional " Mobility 4  Minimal assistance   Additional Comments Pt completed short household functional mobility distances within room w/ varying Min A x1 w/ HHA for safety and support   Additional items Hand hold assistance   Balance   Static Sitting Good   Dynamic Sitting Fair +   Static Standing Fair   Dynamic Standing Fair   Ambulatory Fair -   Activity Tolerance   Activity Tolerance Patient tolerated treatment well;Patient limited by fatigue   Medical Staff Made Aware LENY Francisco; NELL Reyna   Nurse Made Aware RN cleared   RUE Assessment   RUE Assessment WFL   LUE Assessment   LUE Assessment WFL   Hand Function   Gross Motor Coordination Functional   Fine Motor Coordination Functional   Cognition   Overall Cognitive Status WFL   Arousal/Participation Alert;Responsive;Arousable;Cooperative   Attention Within functional limits   Orientation Level Oriented X4   Memory Within functional limits   Following Commands Follows one step commands without difficulty   Comments Pt pleasant and cooperative   Assessment   Limitation Decreased ADL status;Decreased endurance;Decreased self-care trans;Decreased high-level ADLs   Prognosis Fair   Assessment Pt is a 63 yo female who presented to [facility] with vomiting blood and nausea in which pt dx w/ intractable nausea and vomiting. Pt  has a past medical history of Anemia, Anxiety, Arthritis, Back pain at L4-L5 level, Schreiber esophagus, Bulimia, Colon polyp, Disease of thyroid gland, GERD (gastroesophageal reflux disease), Hearing loss in left ear, History of transfusion, Hyperlipidemia, Hypertension, Hypothyroidism, Leukopenia, NMS (neuroleptic malignant syndrome) (01/03/2020), Pneumonia, RA (rheumatoid arthritis) (HCC), Sciatica, Seizure (HCC), Skin spots, red, Synovial cyst of lumbar spine, Vertigo, Wears dentures, and Weight gain. Pt with active OT orders in which OT consulted to assess pt's functional status and occupational performance to determine safe d/c needs. Pt seen with PT to  increase safety, decrease fall risk, and maximize functional/occupational performance 2* medical complexity which is a regression from pt's functional baseline. Pt lives alone in a 1 SH ranch home. PTA, pt was independent in ADLs, has some assistance with IADLs, and uses RW vs SPC for functional mobility. (-) driving. Currently, pt performing bed mobility @ supervision level, functional transfers @ supervision level w/ no DME, functional mobility w/ varying Min A x1 w/ HHA, UB ADLs @ supervision level, and LB ADLs w/ Min A. Pt demonstrates the following limitations/impairments which impact the pt's ability to engage in valued occupations: balance, endurance/activity tolerance, standing tolerance, functional reach, postural/trunk control, strength, safety awareness, and pain. From an OT standpoint, recommend discharge w/ Level 3 resources once medically stable. The patient's raw score on the -PAC Daily Activity Inpatient Short Form is 20. A raw score of greater than or equal to 19 suggests the patient may benefit from discharge to home. Please refer to the recommendation of the Occupational Therapist for safe discharge planning.  Pt would benefit from skilled OT services 2-3x/wk to address acute care needs and underlying performance skills to promote safety, decrease fall risk, and enhance occupational performance to return to PLOF. Goals to be met within the next 10-14 days.   Goals   Patient Goals To go home   LTG Time Frame 10-14   Long Term Goal #1 See OT goals listed below   Plan   Treatment Interventions ADL retraining;Functional transfer training;UE strengthening/ROM;Endurance training;Patient/family training;Equipment evaluation/education;Compensatory technique education;Continued evaluation;Energy conservation;Activityengagement   Goal Expiration Date 05/12/25   OT Frequency 2-3x/wk   Discharge Recommendation   Rehab Resource Intensity Level, OT III (Minimum Resource Intensity)   AM-PAC Daily Activity  Inpatient   Lower Body Dressing 3   Bathing 3   Toileting 3   Upper Body Dressing 3   Grooming 4   Eating 4   Daily Activity Raw Score 20   Daily Activity Standardized Score (Calc for Raw Score >=11) 42.03   AM-PAC Applied Cognition Inpatient   Following a Speech/Presentation 3   Understanding Ordinary Conversation 4   Taking Medications 3   Remembering Where Things Are Placed or Put Away 4   Remembering List of 4-5 Errands 4   Taking Care of Complicated Tasks 3   Applied Cognition Raw Score 21   Applied Cognition Standardized Score 44.3   End of Consult   Education Provided Yes   Patient Position at End of Consult Supine;Bed/Chair alarm activated;All needs within reach   Nurse Communication Nurse aware of consult       OT GOALS:    Pt will improve functional mobility during ADL/IADL/leisure tasks with Mod I using AE/DME prn.    Pt will improve activity tolerance/functional endurance during ADL/IADL/leisure tasks for at least 20 minutes to improve occupational performance and engagement in valued occupations using AE/DME prn.    Pt will engage in ongoing functional/formal cognitive assessments to assist with safe d/c planning and increase safety during functional tasks.    Pt will improve dynamic standing balance for at least 15 minutes with Mod I during functional tasks to decrease fall risk and improve independence and engagement in ADL/IADL/leisure activities.    Pt will follow 100% of multi-step commands in ADL/IADL/leisure activities to improve functional cognition used in functional daily routines.    Pt will complete functional transfers on and off all surfaces used in daily routines with Mod I for safety to maximize functional/occupational performance.    Pt will complete all bed mobility tasks with Mod I to serve as a prerequisite for EOB/OOB ADL/IADL/leisure tasks, optimize positioning/comfort, and increase functional independence.    Pt will independently demonstrate good carryover of safety precautions  and education/training during ADL/IADL/leisure tasks with energy conservation techniques s/p skilled instruction without verbal cues.    Pt will complete UB ADL tasks with Mod I using AE/DME prn to increase functional independence in ADL/IADL/leisure tasks.    Pt will complete LB ADL tasks with Mod I using AE/DME prn to increase functional independence in ADL/IADL/leisure tasks.     Pt will complete toileting tasks with Mod I and good hygiene/thoroughness using AE/DME prn to increase functional independence.    Pt will independently identify and utilize 2-3 positive coping strategies to enhance overall wellbeing and engagement in valued occupations.    Kayla Bernabe MS, OTR/L

## 2025-04-28 NOTE — ASSESSMENT & PLAN NOTE
Due to hematemesis. Iron deficiency largely driven by blood loss from grade D esophaguitis as described elsewhere.    Recent Labs     04/26/25  0007 04/27/25  0556 04/28/25  0516   HGB 8.3* 7.4* 6.9*       Plan:  S/p 1u pRBC on 4/28  Transfuse >7.0 goal  Continue BID PPI  Continue sucralfate  Venofer as above

## 2025-04-28 NOTE — ASSESSMENT & PLAN NOTE
Appears to have acute flare of known esophagitis    Plan:  Will be discharged on twice daily PPI until midnight, followed by daily  Hold Celebrex  Continue with sucralfate 3 times daily with meals

## 2025-04-28 NOTE — ASSESSMENT & PLAN NOTE
Improving/resolving.    Patient reports complaints of intractable nausea and vomiting which has since resolved with multiple rounds of Zofran in ER.  Also states has not had a bowel movement for 3 days.    Plan:  Continue to monitor symptoms for now  Sx controlled; no changes 4/26-4/28 re: symptom meds  EGD 4/28 w GI; anticipate dc to home once nausea and po intake improve   Continue with PPI, Zofran as needed  Bowel regimen including senna daily and MiraLAX

## 2025-04-28 NOTE — UTILIZATION REVIEW
Initial Clinical Review    Admission: Date/Time/Statement:   Admission Orders (From admission, onward)       Ordered        04/26/25 0341  INPATIENT ADMISSION  Once                          Orders Placed This Encounter   Procedures    INPATIENT ADMISSION     Standing Status:   Standing     Number of Occurrences:   1     Level of Care:   Med Surg [16]     Estimated length of stay:   More than 2 Midnights     Certification:   I certify that inpatient services are medically necessary for this patient for a duration of greater than two midnights. See H&P and MD Progress Notes for additional information about the patient's course of treatment.     ED Arrival Information       Expected   -    Arrival   4/25/2025 22:31    Acuity   Urgent              Means of arrival   Ambulance    Escorted by   [employer]    Service   Hospitalist    Admission type   Emergency              Arrival complaint   Vomiting             Chief Complaint   Patient presents with    Abdominal Pain     Hx gastroparesis.States began with vomiting four days ago. Mid upper abdominal pain and nausea.        Initial Presentation: 64 y.o. female to ED by ems admitted Inpatient d/t Intractable nausea and vomiting. resolved with multiple rounds of Zofran in ER. Also states has not had a bowel movement for 3 days.multiple episodes of hematemesis. Has vomited at least twice in the ER with no bloody emesis noted. PMH of schizoaffective disorder, severe esophagitis/GERD, gastroparesis, hypothyroidism. HR 90'S-106. hgb 8.3, hct 28.3. IRON 14. FERRITIN 7. IRON SAT 2.  transferrin 447.TIBC 625.8. UIBC 612. . CHLOR 93. CT abdomen and pelvis notable for esophagitis similar to prior findings on EGD.NPO.IV PPI.Zofran.senna and MiraLAX. Reglan. Sucralfate. Seroquel, Paxil.1 L fluid bolus. Holding Celebrex. Gi consult.    Anticipated Length of Stay/Certification Statement: Patient will be admitted on an inpatient basis with an anticipated length of  stay of greater than 2 midnights secondary to above.     4/26 GI consult:Hematemesis. robert galan tear, with n/v related to dyspepsia/regurgitation in the setting of loose TIF. Pt believes she may be allergic to aerosolized latex in her home, and that's responsible for n/v. Plan for EGD for evaluation & re-evaluation of severe esophagitis. Past medical history is notable for Schreiber's esophagus, severe gastroparesis, hiatal hernia s/p C TIF in 2021 presents with 1 episode of hematemesis.  Patient states yesterday morning she developed 1 episode of vomiting with bright red blood which prompted her to come to the hospital.  Patient has had multiple hospitalizations due to concerns of hematemesis most recently being in March of this year.  At that time patient underwent an EGD was notable for grade D esophagitis due to a large hiatal hernia.  Patient was started on twice daily PPI and recommended to have a EGD repeat in 8 to 12 weeks, biopsies obtained at that time were negative.  Patient stated she has been taking her medications as prescribed however continues to having chest discomfort due to reflux.  At this time patient denies any further episodes of hematemesis since coming to the hospital and denies any melena. IV PPI.Endorsing not having a bowel movement in 3 days. Continue with MiraLAX twice daily and senna.    Date: 4/27   Day 2: N&V resolving/improving. slowly feeling better and better.  She states that her clear liquid breakfast was delicious this morning, but she felt that she had a reaction to the chicken broth which she attributes to MSG.  She described the side effects as headache.  She denies fevers, chills, rash, shortness of breath.  Her nausea and vomiting are improving.  She has had no emesis today.  She has not felt much nausea and is looking forward to lunch. WBC 2.89. HGB 7.4, HCT 26.9. NPO.EGD on 4/28.    ED Treatment-Medication Administration from 04/25/2025 8325 to 04/26/2025 2911          Date/Time Order Dose Route Action     04/25/2025 2305 ondansetron (FOR EMS ONLY) (ZOFRAN) 4 mg/2 mL injection 4 mg 0 mg Does not apply Given to EMS     04/25/2025 2334 Famotidine (PF) (PEPCID) injection 20 mg 20 mg Intravenous Given     04/25/2025 2334 ondansetron (ZOFRAN) injection 4 mg 4 mg Intravenous Given              04/26/2025 0109 sodium chloride 0.9 % bolus 1,000 mL 1,000 mL Intravenous New Bag     04/26/2025 0131 acetaminophen (TYLENOL) tablet 975 mg 975 mg Oral Given     04/26/2025 0131 aluminum-magnesium hydroxide-simethicone (MAALOX) oral suspension 30 mL 30 mL Oral Given     04/26/2025 0203 ondansetron (ZOFRAN) injection 4 mg 4 mg Intravenous Given          PT/OT  NPO  OOB  SCD  DYSPHAGIA DIET  I&O    Scheduled Medications:  [Held by provider] amLODIPine, 5 mg, Oral, Daily  iron sucrose, 300 mg, Intravenous, Daily  levothyroxine, 25 mcg, Oral, Daily  loratadine, 10 mg, Oral, Daily  metoclopramide, 5 mg, Oral, TID AC  pantoprazole, 40 mg, Intravenous, Q12H YUMIKO  PARoxetine, 60 mg, Oral, Daily  polyethylene glycol, 17 g, Oral, BID  QUEtiapine, 400 mg, Oral, HS  senna, 1 tablet, Oral, Daily  sucralfate, 1 g, Oral, TID With Meals      Continuous IV Infusions:     PRN Meds:  LORazepam, 2 mg, Oral, Q6H PRN 4/27 X 1  ondansetron, 4 mg, Intravenous, Q6H PRN 4/26 X 1      ED Triage Vitals   Temperature Pulse Respirations Blood Pressure SpO2 Pain Score   04/25/25 2236 04/25/25 2236 04/25/25 2236 04/25/25 2236 04/25/25 2236 04/25/25 2235   99.1 °F (37.3 °C) 92 18 135/73 94 % 10 - Worst Possible Pain     Weight (last 2 days)       Date/Time Weight    04/26/25 0600 80.2 (176.81)            Vital Signs (last 3 days)       Date/Time Temp Pulse Resp BP MAP (mmHg) SpO2 O2 Device Patient Position - Orthostatic VS Marcella Coma Scale Score Pain    04/28/25 09:10:17 98.4 °F (36.9 °C) 92 16 116/67 83 91 % -- -- -- --    04/28/25 07:37:47 98 °F (36.7 °C) 94 17 115/69 84 93 % -- -- -- --    04/27/25 22:51:47 98.3 °F (36.8 °C)  85 16 95/58 70 97 % -- -- -- --    04/27/25 2012 -- -- -- -- -- -- None (Room air) -- 15 No Pain    04/27/25 15:05:03 97.8 °F (36.6 °C) 91 -- 127/74 92 98 % -- -- -- --    04/27/25 0800 -- -- -- -- -- -- -- -- 15 No Pain    04/27/25 07:37:18 98.2 °F (36.8 °C) 95 16 106/65 79 95 % -- -- -- --    04/26/25 2002 -- -- -- -- -- -- None (Room air) -- 15 No Pain    04/26/25 14:25:59 98.3 °F (36.8 °C) 82 20 96/60 72 94 % -- -- -- --    04/26/25 08:39:32 -- -- -- 118/72 87 -- -- -- -- --    04/26/25 0800 -- -- -- -- -- -- -- -- 15 No Pain    04/26/25 07:57:16 98.5 °F (36.9 °C) 92 12 104/67 79 96 % -- -- -- --    04/26/25 0600 -- 82 16 -- -- -- -- -- -- 10 - Worst Possible Pain    04/26/25 05:01:10 99 °F (37.2 °C) -- -- 127/69 88 -- -- Lying -- --    04/26/25 0450 -- -- -- -- -- -- None (Room air) -- 15 --    04/26/25 0300 -- 89 16 140/71 96 94 % None (Room air) Sitting -- --    04/26/25 0200 -- 89 14 136/67 97 96 % None (Room air) Sitting -- --    04/26/25 0131 -- -- -- -- -- -- -- -- -- 5    04/26/25 0107 -- 106 20 143/77 105 99 % None (Room air) Sitting -- --    04/25/25 2239 -- -- -- -- -- -- None (Room air) -- -- --    04/25/25 2236 99.1 °F (37.3 °C) 92 18 135/73 98 94 % None (Room air) Sitting -- 10 - Worst Possible Pain    04/25/25 2235 -- -- -- -- -- -- -- -- -- 10 - Worst Possible Pain              Pertinent Labs/Diagnostic Test Results:   Radiology:  CT abdomen pelvis with contrast   Final Interpretation by Rakan Harvey DO (04/26 0140)      Small hiatal hernia with marked wall thickening of the distal esophagus, consistent with esophagitis as seen on recent EGD.      Cholelithiasis            Workstation performed: [id number]           Cardiology:  ECG 12 lead   Final Result by Anand Michelle MD (04/26 2009)   Suspect arm lead reversal, interpretation assumes no reversal   Normal sinus rhythm   Right axis deviation   Abnormal ECG   When compared with ECG of 20-Mar-2025 16:17,   Questionable change in QRS axis    Confirmed by Anand Michelle ([id number]) on 4/26/2025 8:09:30 PM                  Results from last 7 days   Lab Units 04/28/25  0516 04/27/25  0556 04/26/25  0007   WBC Thousand/uL 2.90* 2.89* 9.31   HEMOGLOBIN g/dL 6.9* 7.4* 8.3*   HEMATOCRIT % 24.6* 26.9* 28.3*   PLATELETS Thousands/uL 259 285 321   TOTAL NEUT ABS Thousands/µL 1.61* 1.32* 7.88*         Results from last 7 days   Lab Units 04/28/25  0516 04/27/25  0556 04/26/25  1510 04/26/25  0007   SODIUM mmol/L 137 139 134* 126*   POTASSIUM mmol/L 4.5 4.0 3.2* 3.9   CHLORIDE mmol/L 103 105 98 93*   CO2 mmol/L 27 28 29 24   ANION GAP mmol/L 7 6 7 9   BUN mg/dL 6 5 7 10   CREATININE mg/dL 0.65 0.63 0.58* 0.54*   EGFR ml/min/1.73sq m 94 95 97 100   CALCIUM mg/dL 8.9 9.0 9.0 9.0     Results from last 7 days   Lab Units 04/26/25  0007   AST U/L 35   ALT U/L 13   ALK PHOS U/L 65   TOTAL PROTEIN g/dL 6.7   ALBUMIN g/dL 4.0   TOTAL BILIRUBIN mg/dL 0.38         Results from last 7 days   Lab Units 04/28/25  0516 04/27/25  0556 04/26/25  1510 04/26/25  0007   GLUCOSE RANDOM mg/dL 86 98 91 110       Results from last 7 days   Lab Units 04/26/25  0007   FERRITIN ng/mL 7*   IRON SATURATION % 2*   IRON ug/dL 14*   TIBC ug/dL 625.8*     Results from last 7 days   Lab Units 04/26/25  0007   TRANSFERRIN mg/dL 447*     Results from last 7 days   Lab Units 04/28/25  0856   UNIT PRODUCT CODE  [id number]   UNIT NUMBER  [id number]   UNITABO  O   UNITRH  POS   CROSSMATCH  Compatible   UNIT DISPENSE STATUS  Issued   UNIT PRODUCT VOL ml 350         Results from last 7 days   Lab Units 04/26/25  0007   LIPASE u/L 24       Past Medical History:   Diagnosis Date    Anemia     Anxiety     Arthritis     Back pain at L4-L5 level     Schreiber esophagus     Bulimia     Colon polyp     Disease of thyroid gland     hypothyroid    GERD (gastroesophageal reflux disease)     Hearing loss in left ear     History of transfusion     Hyperlipidemia     Hypertension     Hypothyroidism     Leukopenia      NMS (neuroleptic malignant syndrome) 01/03/2020    Pneumonia     RA (rheumatoid arthritis) (HCC)     in feet    Sciatica     Seizure (HCC)     due to medication mix up 8/2018 only one historically    Skin spots, red     lower right arm - poss from cat- no s/s infection    Synovial cyst of lumbar spine     Vertigo     Wears dentures     full set    Weight gain      Present on Admission:   Intractable nausea and vomiting   Schizoaffective disorder (HCC)   Primary hypertension   Hematemesis   Gastroesophageal reflux disease with esophagitis, Schreiber's esophagus and gastritis   Gastroparesis   Microcytic anemia   (Resolved) Hyponatremia   Other constipation   Severe iron deficiency anemia       Admitting Diagnosis: Dehydration [E86.0]  Abdominal pain [R10.9]  Age/Sex: 64 y.o. female    Network Utilization Review Department  ATTENTION: Please call with any questions or concerns to [phone] and carefully listen to the prompts so that you are directed to the right person. All voicemails are confidential.   For Discharge needs, contact Care Management DC Support Team at [phone] opt. 2  Send all requests for admission clinical reviews, approved or denied determinations and any other requests to dedicated fax number below belonging to the [place] where the patient is receiving treatment. List of dedicated fax numbers for the Facilities:  FACILITY NAME UR FAX NUMBER   ADMISSION DENIALS (Administrative/Medical Necessity) [phone]   DISCHARGE SUPPORT TEAM (NETWORK) [phone]   PARENT CHILD HEALTH (Maternity/NICU/Pediatrics) [phone]   [facility] [phone]   [facility] [phone]   [facility] [phone]   [facility] [phone]   [facility] [phone]   [facility] [phone]   [facility] [phone]   [facility]  [facility] [phone]   [facility] [phone]   [facility] [phone]   [facility] [phone]   [facility] [phone]

## 2025-04-28 NOTE — UTILIZATION REVIEW
NOTIFICATION OF INPATIENT ADMISSION      AUTHORIZATION REQUEST   SERVICING FACILITY:   [facility]  Address: [address] [place] [address]  Tax ID: [id number]  NPI: [id number] ATTENDING PROVIDER:  Attending Name and NPI#: Roslyn Dillon Md [[id number]]  Address: [address] [place] [address]  Phone: [phone]   ADMISSION INFORMATION:  Place of Service: Inpatient [facility]  Place of Service Code: 21  Inpatient Admission Date/Time: 4/26/25  3:41 AM  Discharge Date/Time: No discharge date for patient encounter.  Admitting Diagnosis Code/Description:  Dehydration [E86.0]  Abdominal pain [R10.9]     UTILIZATION REVIEW CONTACT:  Erin Ferrer, Utilization   Network Utilization Review Department  Phone: [phone]  Fax: [phone]  Email: [email or web address]  Contact for approvals/pending authorizations, clinical reviews, and discharge.     PHYSICIAN ADVISORY SERVICES:  Medical Necessity Denial & Ytsg-td-Tbuf Review  Phone: [phone]  Fax: [phone]  Email: [email or web address]     DISCHARGE SUPPORT TEAM:  For Patients Discharge Needs & Updates  Phone: [phone] opt. 2 Fax: [phone]  Email: [email or web address]

## 2025-04-28 NOTE — ASSESSMENT & PLAN NOTE
Lab Results   Component Value Date    IRON 14 (L) 04/26/2025    FERRITIN 7 (L) 04/26/2025    TIBC 625.8 (H) 04/26/2025    CONCFE 2 (L) 04/26/2025     Recent Labs     04/26/25  0007 04/27/25  0556 04/28/25  0516   HGB 8.3* 7.4* 6.9*     Noted on admission.  Due to grade D esophagitis, most likely.  Currently with vomiting blood prior to admission  Per Ganzoni equation, 704 mg iron deficit    Plan:  Transfuse goal greater than 7.0  We will give IV Venofer 300 mg IV x 3 days ending 4/28

## 2025-04-28 NOTE — PHYSICAL THERAPY NOTE
PHYSICAL THERAPY EVALUATION          Patient Name: Irene Plascencia  Today's Date: 4/28/2025 04/28/25 1049   Note Type   Note type Evaluation   Pain Assessment   Pain Assessment Tool 0-10   Pain Score No Pain   Hospital Pain Intervention(s) Repositioned;Ambulation/increased activity;Emotional support   Restrictions/Precautions   Weight Bearing Precautions Per Order No   Other Precautions Chair Alarm;Bed Alarm;Multiple lines;Fall Risk   Home Living   Type of Home House   Home Layout One level;Stairs to enter with rails  (Ranch; 3 FAMILIA)   Home Equipment Walker;Cane   Additional Comments Pt reports living alone and did not need assistance PTA. Pt reports that her significant other lives in [facility].   Prior Function   Level of [place] Independent with functional mobility   Lives With (S)  Alone   Receives Help From Family;Friend(s)   Falls in the last 6 months 1 to 4  (Pt reports 1 fall.)   Comments Pt reports owning a walker vs cane for ambulation.   General   Family/Caregiver Present No   Cognition   Overall Cognitive Status WFL   Attention Within functional limits   Orientation Level Oriented X4   Memory Within functional limits   Following Commands Follows one step commands without difficulty   RLE Assessment   RLE Assessment WFL   Strength RLE   RLE Overall Strength 4/5  (Assessed functionally.)   LLE Assessment   LLE Assessment WFL   Strength LLE   LLE Overall Strength 4/5  (Assessed functionally.)   Bed Mobility   Supine to Sit Unable to assess   Sit to Supine 5  Supervision   Additional items Increased time required   Additional Comments Pt was sitting EOB at the start of the session, with the bed alarm going off. Pt was returned to lying in bed with bed alarm engaged and all needs within reach.   Transfers   Sit to Stand 5  Supervision   Additional items Increased time required;Verbal cues   Stand to Sit 5   Supervision   Additional items Increased time required;Verbal cues   Additional Comments no AD   Ambulation/Elevation   Gait pattern Improper Weight shift;Decreased foot clearance;Inconsistent abundio;Short stride  (Limited arm swing)   Gait Assistance 4  Minimal assist   Additional items Assist x 1   Assistive Device None   Distance 10'x2   Stair Management Assistance Not tested   Balance   Static Sitting Good   Dynamic Sitting Fair +   Static Standing Fair +   Dynamic Standing Fair   Ambulatory Fair -   Activity Tolerance   Activity Tolerance Patient tolerated treatment well;Patient limited by fatigue   Medical Staff Made Aware Kayla OT, and Maryam, PT; were present for co-evaluation due to the pt's current medical presentation.   Nurse Made Aware Pt is appropriate to be seen and mobilize per nsg.   Assessment   Prognosis Good   Problem List Decreased endurance;Decreased mobility;Impaired balance   Assessment Pt is a 64 y.o. female seen for PT evaluation on 4/28/25 after being admitted to [facility] on 4/25/25 for vomiting blood. Pt presenting s/p intractable nausea and vomiting. Pt's current/active diagnoses include hematemesis, GERD with esophagitis, Schreiber's esophagus and gastritis, gastroparesis, primary hypertension, schizoaffective disorder, microcytic anemia, hyponatremia. Pt  has a past medical history of Anemia, Anxiety, Arthritis, Back pain at L4-L5 level, Schreiber esophagus, Bulimia, Colon polyp, Disease of thyroid gland, GERD (gastroesophageal reflux disease), Hearing loss in left ear, History of transfusion, Hyperlipidemia, Hypertension, Hypothyroidism, Leukopenia, NMS (neuroleptic malignant syndrome), Pneumonia, RA (rheumatoid arthritis) ([facility]), Sciatica, Seizure (HCC), Skin spots, red, Synovial cyst of lumbar spine, Vertigo, Wears dentures, and Weight gain. Pt with hospital admission from 3/20-3/23 with hematemesis. PT evaluation was ordered to assess the pt's functional mobility and  discharge recommendations. Prior to admission, pt was independent with functional mobility. Pt lives alone in a ranch style house with 3 FAMILIA. Pt is of high complexity due to ongoing medical management, continuous pulse oximetry monitoring, fall risk, increased reliance on caregivers for support, functioning below baseline secondary to current medical presentation. At evaluation, pt was sitting EOB with bed alarm going off. Pt performed sit to stand/stand to sit transfers with supervision. Pt ambulated with minAx1 for 10'x2. Pt returned to lying supine in bed with supervision at the end of the session. Pt presents with the following PT impairments decreased endurance, decreased mobility, decreased activity tolerance, fall risk, balance deficits. The pt was left lying supine in bed with bed alarm engaged and all needs within reach including the call bell and the room phone. Based on the pt's functional performance during the PT evaluation, the discharge recommendation is level 3 resource intensity. Pt will continue to benefit from skilled IP PT services in order to address the functional impairments noted above in preparation for a safer discharge.   Barriers to Discharge Decreased caregiver support   Goals   Patient Goals To go home.   STG Expiration Date 05/12/25   Short Term Goal #1 In 14 days the pt will be able to perform: 1) bed mobility skills with mod i to increase safety and independence to reduce reliance on caregiver. 2) functional transfers with mod I for increased independence and safety. 3) ambulate 150' with mod I using the most appropriate and least restrictive assistive device without LOB for increased independence and improve safety for household and community ambulation. 4) stair training up/down 3 steps with mod I with the most appropriate technique so that the pt can safely enter/exit home environment and navigate the community. 5) improve sitting and standing balance by 1/2 grade to improve  safety and decrease risk of falls and improve functional mobility. 6) improve BLE strength by 1/2 grade to reduce risk of falls and improve functional mobility.   Plan   Treatment/Interventions Functional transfer training;LE strengthening/ROM;Elevations;Therapeutic exercise;Endurance training;Patient/family training;Equipment eval/education;Bed mobility;Gait training;Spoke to nursing;Spoke to case management;OT   PT Frequency 3-5x/wk   Discharge Recommendation   Rehab Resource Intensity Level, PT III (Minimum Resource Intensity)   AM-PAC Basic Mobility Inpatient   Turning in Flat Bed Without Bedrails 3   Lying on Back to Sitting on Edge of Flat Bed Without Bedrails 3   Moving Bed to Chair 3   Standing Up From Chair Using Arms 3   Walk in Room 3   Climb 3-5 Stairs With Railing 3   Basic Mobility Inpatient Raw Score 18   Basic Mobility Standardized Score 41.05   [facility] Highest Level Of Mobility   -HLM Goal 6: Walk 10 steps or more   -HLM Achieved 6: Walk 10 steps or more   Modified Tiffany Scale   Modified Tiffany Scale 4   End of Consult   Patient Position at End of Consult Supine;Bed/Chair alarm activated;All needs within reach     Maribell Lee, NELL

## 2025-04-28 NOTE — ANESTHESIA PREPROCEDURE EVALUATION
Procedure:  EGD    Relevant Problems   CARDIO   (+) Hyperlipidemia   (+) Primary hypertension      ENDO   (+) Acquired hypothyroidism      GI/HEPATIC   (+) Gastroesophageal reflux disease with esophagitis, Schreiber's esophagus and gastritis   (+) Hematemesis   (+) Hiatal hernia with GERD without esophagitis      HEMATOLOGY   (+) Iron deficiency anemia   (+) Microcytic anemia   (+) Nutritional anemia   (+) Severe iron deficiency anemia    (+) Vitamin B12 deficiency (dietary) anemia      MUSCULOSKELETAL   (+) Chronic bilateral low back pain without sciatica   (+) Hiatal hernia with GERD without esophagitis      NEURO/PSYCH   (+) Anxiety   (+) Chronic bilateral low back pain without sciatica   (+) Headache   (+) Word finding difficulty      Behavioral Health   (+) Schizoaffective disorder (HCC)      Other   (+) Osteomyelitis, unspecified site, unspecified type (HCC)      Mult EGDs in past, esphagitis w flares      Recent labs personally reviewed:  Lab Results   Component Value Date    WBC 2.90 (L) 04/28/2025    HGB 6.9 (L) 04/28/2025     04/28/2025     Lab Results   Component Value Date     06/01/2017    K 4.5 04/28/2025    BUN 6 04/28/2025    CREATININE 0.65 04/28/2025    GLUCOSE 155 (H) 01/02/2020     Lab Results   Component Value Date    PTT 28 12/23/2024      Lab Results   Component Value Date    INR 0.94 12/23/2024       Lab Results   Component Value Date    HGBA1C 5.0 05/13/2021       Type and Screen:  O      TTE 2024  History    Heart failure, hypothyroidism, hypertension, aspiration pneumonia, hyperlipidemia, anemia, hiatal hernia, GERD, SIRS.     Interpretation Summary         Left Ventricle: Left ventricular cavity size is normal. Wall thickness is normal. The left ventricular ejection fraction is 60%. Systolic function is normal. Wall motion is normal. Diastolic function is normal.    Right Ventricle: Right ventricular cavity size is mildly dilated. Systolic function is mildly reduced.    Left  Atrium: The atrium is mildly dilated.    Mitral Valve: There is mild annular calcification. There is mild regurgitation.    Tricuspid Valve: There is moderate regurgitation.     Physical Exam    Airway    Mallampati score: III  TM Distance: >3 FB  Neck ROM: full     Dental        Cardiovascular      Pulmonary      Other Findings  post-pubertal.      Anesthesia Plan  ASA Score- 3     Anesthesia Type- IV sedation with anesthesia with ASA Monitors.         Additional Monitors:     Airway Plan:            Plan Factors-Exercise tolerance (METS): <4 METS.    Chart reviewed. EKG reviewed. Imaging results reviewed. Existing labs reviewed. Patient summary reviewed.    Patient is not a current smoker.              Induction- intravenous.    Postoperative Plan-     Perioperative Resuscitation Plan - Level 1 - Full Code.       Informed Consent- Anesthetic plan and risks discussed with patient.  I personally reviewed this patient with the CRNA. Discussed and agreed on the Anesthesia Plan with the CRNA..      NPO Status:  No vitals data found for the desired time range.

## 2025-04-28 NOTE — ASSESSMENT & PLAN NOTE
Patient reports having multiple episodes of hematemesis.  Has vomited at least twice in the ER with no bloody emesis noted  Hemoglobin has been around 8 which is baseline in the last 1 month   CT abdomen and pelvis notable for esophagitis similar to prior findings on EGD  EGD 1 month ago showed grade D esophagitis    Plan:  As hemoglobin is stable without further hematemesis and patient, monitor daily CBC  Advance diet to regulars  Plan for EGD  Continue Protonix IV push twice daily; continue oral twice daily on discharge for total of 14 days, ending May 9th  Holding Celebrex/NSAIDs, recommend avoiding these on discharge as well

## 2025-04-28 NOTE — PLAN OF CARE
Problem: PAIN - ADULT  Goal: Verbalizes/displays adequate comfort level or baseline comfort level  Description: Interventions:- Encourage patient to monitor pain and request assistance- Assess pain using appropriate pain scale- Administer analgesics based on type and severity of pain and evaluate response- Implement non-pharmacological measures as appropriate and evaluate response- Consider cultural and social influences on pain and pain management- Notify physician/advanced practitioner if interventions unsuccessful or patient reports new pain  Outcome: Progressing     Problem: INFECTION - ADULT  Goal: Absence or prevention of progression during hospitalization  Description: INTERVENTIONS:- Assess and monitor for signs and symptoms of infection- Monitor lab/diagnostic results- Monitor all insertion sites, i.e. indwelling lines, tubes, and drains- Monitor endotracheal if appropriate and nasal secretions for changes in amount and color- [place] appropriate cooling/warming therapies per order- Administer medications as ordered- Instruct and encourage patient and family to use good hand hygiene technique- Identify and instruct in appropriate isolation precautions for identified infection/condition  Outcome: Progressing  Goal: Absence of fever/infection during neutropenic period  Description: INTERVENTIONS:- Monitor WBC  Outcome: Progressing     Problem: SAFETY ADULT  Goal: Patient will remain free of falls  Description: INTERVENTIONS:- Educate patient/family on patient safety including physical limitations- Instruct patient to call for assistance with activity - Consult OT/PT to assist with strengthening/mobility - Keep Call bell within reach- Keep bed low and locked with side rails adjusted as appropriate- Keep care items and personal belongings within reach- Initiate and maintain comfort rounds- Make Fall Risk Sign visible to staff- Offer Toileting every 2 Hours, in advance of need- Initiate/Maintain alarm-  Obtain necessary fall risk management equipment: - Apply yellow socks and bracelet for high fall risk patients- Consider moving patient to room near nurses station  Outcome: Progressing  Goal: Maintain or return to baseline ADL function  Description: INTERVENTIONS:-  Assess patient's ability to carry out ADLs; assess patient's baseline for ADL function and identify physical deficits which impact ability to perform ADLs (bathing, care of mouth/teeth, toileting, grooming, dressing, etc.)- Assess/evaluate cause of self-care deficits - Assess range of motion- Assess patient's mobility; develop plan if impaired- Assess patient's need for assistive devices and provide as appropriate- Encourage maximum independence but intervene and supervise when necessary- Involve family in performance of ADLs- Assess for home care needs following discharge - Consider OT consult to assist with ADL evaluation and planning for discharge- Provide patient education as appropriate  Outcome: Progressing  Goal: Maintains/Returns to pre admission functional level  Description: INTERVENTIONS:- Perform AM-PAC 6 Click Basic Mobility/ Daily Activity assessment daily.- Set and communicate daily mobility goal to care team and patient/family/caregiver. - Collaborate with rehabilitation services on mobility goals if consulted- Perform Range of Motion 3 times a day.- Reposition patient every 2 hours.- Dangle patient 3 times a day- Stand patient 3 times a day- Ambulate patient 3 times a day- Out of bed to chair 3 times a day - Out of bed for meals 3 times a day- Out of bed for toileting- Record patient progress and toleration of activity level   Outcome: Progressing     Problem: DISCHARGE PLANNING  Goal: Discharge to home or other facility with appropriate resources  Description: INTERVENTIONS:- Identify barriers to discharge w/patient and caregiver- Arrange for needed discharge resources and transportation as appropriate- Identify discharge learning  needs (meds, wound care, etc.)- Arrange for interpretive services to assist at discharge as needed- Refer to Case Management Department for coordinating discharge planning if the patient needs post-hospital services based on physician/advanced practitioner order or complex needs related to functional status, cognitive ability, or social support system  Outcome: Progressing     Problem: Knowledge Deficit  Goal: Patient/family/caregiver demonstrates understanding of disease process, treatment plan, medications, and discharge instructions  Description: Complete learning assessment and assess knowledge base.Interventions:- Provide teaching at level of understanding- Provide teaching via preferred learning methods  Outcome: Progressing     Problem: Prexisting or High Potential for Compromised Skin Integrity  Goal: Skin integrity is maintained or improved  Description: INTERVENTIONS:- Identify patients at risk for skin breakdown- Assess and monitor skin integrity- Assess and monitor nutrition and hydration status- Monitor labs - Assess for incontinence - Turn and reposition patient- Assist with mobility/ambulation- Relieve pressure over bony prominences- Avoid friction and shearing- Provide appropriate hygiene as needed including keeping skin clean and dry- Evaluate need for skin moisturizer/barrier cream- Collaborate with interdisciplinary team - Patient/family teaching- Consider wound care consult   Outcome: Progressing

## 2025-04-28 NOTE — DISCHARGE INSTR - AVS FIRST PAGE
Your EGD showed grade D esophagitis, worsening.  We think that following up with thoracic surgery would be a good idea for discussing hernia repair as we think this is causing most of your symptoms.    We will discharge you on Carafate 1 g 3 times a day and omeprazole 40 mg twice a day.    Please follow-up with your GI doctors regarding the biopsies that they took for H. Pylori.    Thank you for choosing [facility] and we wish you a safe journey home!

## 2025-04-28 NOTE — ASSESSMENT & PLAN NOTE
Has history of chronic hyponatremia  Recent Labs     04/26/25  1510 04/27/25  0556 04/28/25  0516   SODIUM 134* 139 137     Received 1 L fluid bolus  Likely due to fluid losses from emesis as primary issue  Repeat sodium to assess for further treatment

## 2025-04-28 NOTE — PLAN OF CARE
Problem: PAIN - ADULT  Goal: Verbalizes/displays adequate comfort level or baseline comfort level  Description: Interventions:- Encourage patient to monitor pain and request assistance- Assess pain using appropriate pain scale- Administer analgesics based on type and severity of pain and evaluate response- Implement non-pharmacological measures as appropriate and evaluate response- Consider cultural and social influences on pain and pain management- Notify physician/advanced practitioner if interventions unsuccessful or patient reports new pain  Outcome: Progressing     Problem: INFECTION - ADULT  Goal: Absence or prevention of progression during hospitalization  Description: INTERVENTIONS:- Assess and monitor for signs and symptoms of infection- Monitor lab/diagnostic results- Monitor all insertion sites, i.e. indwelling lines, tubes, and drains- Monitor endotracheal if appropriate and nasal secretions for changes in amount and color- [place] appropriate cooling/warming therapies per order- Administer medications as ordered- Instruct and encourage patient and family to use good hand hygiene technique- Identify and instruct in appropriate isolation precautions for identified infection/condition  Outcome: Progressing  Goal: Absence of fever/infection during neutropenic period  Description: INTERVENTIONS:- Monitor WBC  Outcome: Progressing     Problem: SAFETY ADULT  Goal: Patient will remain free of falls  Description: INTERVENTIONS:- Educate patient/family on patient safety including physical limitations- Instruct patient to call for assistance with activity - Consult OT/PT to assist with strengthening/mobility - Keep Call bell within reach- Keep bed low and locked with side rails adjusted as appropriate- Keep care items and personal belongings within reach- Initiate and maintain comfort rounds- Make Fall Risk Sign visible to staff- Offer Toileting every 2 Hours, in advance of need- Initiate/Maintain bed alarm-  Obtain necessary fall risk management equipment:- Apply yellow socks and bracelet for high fall risk patients- Consider moving patient to room near nurses station  Outcome: Progressing  Goal: Maintain or return to baseline ADL function  Description: INTERVENTIONS:-  Assess patient's ability to carry out ADLs; assess patient's baseline for ADL function and identify physical deficits which impact ability to perform ADLs (bathing, care of mouth/teeth, toileting, grooming, dressing, etc.)- Assess/evaluate cause of self-care deficits - Assess range of motion- Assess patient's mobility; develop plan if impaired- Assess patient's need for assistive devices and provide as appropriate- Encourage maximum independence but intervene and supervise when necessary- Involve family in performance of ADLs- Assess for home care needs following discharge - Consider OT consult to assist with ADL evaluation and planning for discharge- Provide patient education as appropriate  Outcome: Progressing  Goal: Maintains/Returns to pre admission functional level  Description: INTERVENTIONS:- Perform AM-PAC 6 Click Basic Mobility/ Daily Activity assessment daily.- Set and communicate daily mobility goal to care team and patient/family/caregiver. - Collaborate with rehabilitation services on mobility goals if consulted- Perform Range of Motion 3 times a day.- Reposition patient every 2 hours.- Dangle patient 3 times a day- Stand patient 3 times a day- Ambulate patient 3 times a day- Out of bed to chair 3 times a day - Out of bed for meals 3 times a day- Out of bed for toileting- Record patient progress and toleration of activity level   Outcome: Progressing     Problem: DISCHARGE PLANNING  Goal: Discharge to home or other facility with appropriate resources  Description: INTERVENTIONS:- Identify barriers to discharge w/patient and caregiver- Arrange for needed discharge resources and transportation as appropriate- Identify discharge learning needs  (meds, wound care, etc.)- Arrange for interpretive services to assist at discharge as needed- Refer to Case Management Department for coordinating discharge planning if the patient needs post-hospital services based on physician/advanced practitioner order or complex needs related to functional status, cognitive ability, or social support system  Outcome: Progressing     Problem: Knowledge Deficit  Goal: Patient/family/caregiver demonstrates understanding of disease process, treatment plan, medications, and discharge instructions  Description: Complete learning assessment and assess knowledge base.Interventions:- Provide teaching at level of understanding- Provide teaching via preferred learning methods  Outcome: Progressing     Problem: Prexisting or High Potential for Compromised Skin Integrity  Goal: Skin integrity is maintained or improved  Description: INTERVENTIONS:- Identify patients at risk for skin breakdown- Assess and monitor skin integrity- Assess and monitor nutrition and hydration status- Monitor labs - Assess for incontinence - Turn and reposition patient- Assist with mobility/ambulation- Relieve pressure over bony prominences- Avoid friction and shearing- Provide appropriate hygiene as needed including keeping skin clean and dry- Evaluate need for skin moisturizer/barrier cream- Collaborate with interdisciplinary team - Patient/family teaching- Consider wound care consult   Outcome: Progressing

## 2025-04-29 VITALS
DIASTOLIC BLOOD PRESSURE: 80 MMHG | RESPIRATION RATE: 14 BRPM | HEIGHT: 62 IN | WEIGHT: 176.81 LBS | TEMPERATURE: 97.9 F | OXYGEN SATURATION: 95 % | SYSTOLIC BLOOD PRESSURE: 122 MMHG | BODY MASS INDEX: 32.54 KG/M2 | HEART RATE: 88 BPM

## 2025-04-29 LAB
ABO GROUP BLD BPU: NORMAL
ANION GAP SERPL CALCULATED.3IONS-SCNC: 7 MMOL/L (ref 4–13)
BASOPHILS # BLD AUTO: 0.04 THOUSANDS/ÂΜL (ref 0–0.1)
BASOPHILS NFR BLD AUTO: 1 % (ref 0–1)
BPU ID: NORMAL
BUN SERPL-MCNC: 6 MG/DL (ref 5–25)
CALCIUM SERPL-MCNC: 9.3 MG/DL (ref 8.4–10.2)
CHLORIDE SERPL-SCNC: 103 MMOL/L (ref 96–108)
CO2 SERPL-SCNC: 29 MMOL/L (ref 21–32)
CREAT SERPL-MCNC: 0.6 MG/DL (ref 0.6–1.3)
CROSSMATCH: NORMAL
EOSINOPHIL # BLD AUTO: 0.27 THOUSAND/ÂΜL (ref 0–0.61)
EOSINOPHIL NFR BLD AUTO: 7 % (ref 0–6)
ERYTHROCYTE [DISTWIDTH] IN BLOOD BY AUTOMATED COUNT: 20.7 % (ref 11.6–15.1)
GFR SERPL CREATININE-BSD FRML MDRD: 96 ML/MIN/1.73SQ M
GLUCOSE SERPL-MCNC: 89 MG/DL (ref 65–140)
HCT VFR BLD AUTO: 31.1 % (ref 34.8–46.1)
HGB BLD-MCNC: 8.9 G/DL (ref 11.5–15.4)
IMM GRANULOCYTES # BLD AUTO: 0.05 THOUSAND/UL (ref 0–0.2)
IMM GRANULOCYTES NFR BLD AUTO: 1 % (ref 0–2)
LYMPHOCYTES # BLD AUTO: 0.52 THOUSANDS/ÂΜL (ref 0.6–4.47)
LYMPHOCYTES NFR BLD AUTO: 14 % (ref 14–44)
MCH RBC QN AUTO: 21.1 PG (ref 26.8–34.3)
MCHC RBC AUTO-ENTMCNC: 28.6 G/DL (ref 31.4–37.4)
MCV RBC AUTO: 74 FL (ref 82–98)
MONOCYTES # BLD AUTO: 0.34 THOUSAND/ÂΜL (ref 0.17–1.22)
MONOCYTES NFR BLD AUTO: 9 % (ref 4–12)
NEUTROPHILS # BLD AUTO: 2.41 THOUSANDS/ÂΜL (ref 1.85–7.62)
NEUTS SEG NFR BLD AUTO: 68 % (ref 43–75)
NRBC BLD AUTO-RTO: 2 /100 WBCS
PLATELET # BLD AUTO: 268 THOUSANDS/UL (ref 149–390)
PMV BLD AUTO: 9.4 FL (ref 8.9–12.7)
POTASSIUM SERPL-SCNC: 4.4 MMOL/L (ref 3.5–5.3)
RBC # BLD AUTO: 4.22 MILLION/UL (ref 3.81–5.12)
SODIUM SERPL-SCNC: 139 MMOL/L (ref 135–147)
UNIT DISPENSE STATUS: NORMAL
UNIT PRODUCT CODE: NORMAL
UNIT PRODUCT VOLUME: 350 ML
UNIT RH: NORMAL
WBC # BLD AUTO: 3.63 THOUSAND/UL (ref 4.31–10.16)

## 2025-04-29 PROCEDURE — 99239 HOSP IP/OBS DSCHRG MGMT >30: CPT | Performed by: PHYSICIAN ASSISTANT

## 2025-04-29 PROCEDURE — 80048 BASIC METABOLIC PNL TOTAL CA: CPT

## 2025-04-29 PROCEDURE — 85025 COMPLETE CBC W/AUTO DIFF WBC: CPT

## 2025-04-29 RX ORDER — SUCRALFATE 1 G/1
1 TABLET ORAL
Qty: 90 TABLET | Refills: 0 | Status: SHIPPED | OUTPATIENT
Start: 2025-04-29 | End: 2025-05-29

## 2025-04-29 RX ORDER — OMEPRAZOLE 40 MG/1
40 CAPSULE, DELAYED RELEASE ORAL 2 TIMES DAILY
Qty: 60 CAPSULE | Refills: 0 | Status: SHIPPED | OUTPATIENT
Start: 2025-04-29 | End: 2025-05-29

## 2025-04-29 RX ORDER — AMLODIPINE BESYLATE 5 MG/1
5 TABLET ORAL DAILY
Qty: 90 TABLET | Refills: 0 | Status: SHIPPED | OUTPATIENT
Start: 2025-04-29

## 2025-04-29 RX ADMIN — PANTOPRAZOLE SODIUM 40 MG: 40 INJECTION, POWDER, FOR SOLUTION INTRAVENOUS at 08:12

## 2025-04-29 RX ADMIN — LEVOTHYROXINE SODIUM 25 MCG: 0.03 TABLET ORAL at 06:31

## 2025-04-29 RX ADMIN — SUCRALFATE 1 G: 1 TABLET ORAL at 11:47

## 2025-04-29 RX ADMIN — METOCLOPRAMIDE 5 MG: 5 TABLET ORAL at 06:31

## 2025-04-29 RX ADMIN — LORATADINE 10 MG: 10 TABLET ORAL at 08:12

## 2025-04-29 RX ADMIN — PAROXETINE HYDROCHLORIDE 60 MG: 20 TABLET, FILM COATED ORAL at 08:13

## 2025-04-29 RX ADMIN — SUCRALFATE 1 G: 1 TABLET ORAL at 06:31

## 2025-04-29 NOTE — PLAN OF CARE
Problem: PAIN - ADULT  Goal: Verbalizes/displays adequate comfort level or baseline comfort level  Description: Interventions:- Encourage patient to monitor pain and request assistance- Assess pain using appropriate pain scale- Administer analgesics based on type and severity of pain and evaluate response- Implement non-pharmacological measures as appropriate and evaluate response- Consider cultural and social influences on pain and pain management- Notify physician/advanced practitioner if interventions unsuccessful or patient reports new pain  Outcome: Progressing     Problem: INFECTION - ADULT  Goal: Absence or prevention of progression during hospitalization  Description: INTERVENTIONS:- Assess and monitor for signs and symptoms of infection- Monitor lab/diagnostic results- Monitor all insertion sites, i.e. indwelling lines, tubes, and drains- Monitor endotracheal if appropriate and nasal secretions for changes in amount and color- [place] appropriate cooling/warming therapies per order- Administer medications as ordered- Instruct and encourage patient and family to use good hand hygiene technique- Identify and instruct in appropriate isolation precautions for identified infection/condition  Outcome: Progressing  Goal: Absence of fever/infection during neutropenic period  Description: INTERVENTIONS:- Monitor WBC  Outcome: Progressing     Problem: SAFETY ADULT  Goal: Patient will remain free of falls  Description: INTERVENTIONS:- Educate patient/family on patient safety including physical limitations- Instruct patient to call for assistance with activity - Consult OT/PT to assist with strengthening/mobility - Keep Call bell within reach- Keep bed low and locked with side rails adjusted as appropriate- Keep care items and personal belongings within reach- Initiate and maintain comfort rounds- Make Fall Risk Sign visible to staff- Offer Toileting every 2 Hours, in advance of need- Initiate/Maintain alarm-  Obtain necessary fall risk management equipment: - Apply yellow socks and bracelet for high fall risk patients- Consider moving patient to room near nurses station  Outcome: Progressing  Goal: Maintain or return to baseline ADL function  Description: INTERVENTIONS:-  Assess patient's ability to carry out ADLs; assess patient's baseline for ADL function and identify physical deficits which impact ability to perform ADLs (bathing, care of mouth/teeth, toileting, grooming, dressing, etc.)- Assess/evaluate cause of self-care deficits - Assess range of motion- Assess patient's mobility; develop plan if impaired- Assess patient's need for assistive devices and provide as appropriate- Encourage maximum independence but intervene and supervise when necessary- Involve family in performance of ADLs- Assess for home care needs following discharge - Consider OT consult to assist with ADL evaluation and planning for discharge- Provide patient education as appropriate  Outcome: Progressing  Goal: Maintains/Returns to pre admission functional level  Description: INTERVENTIONS:- Perform AM-PAC 6 Click Basic Mobility/ Daily Activity assessment daily.- Set and communicate daily mobility goal to care team and patient/family/caregiver. - Collaborate with rehabilitation services on mobility goals if consulted- Perform Range of Motion 3 times a day.- Reposition patient every  hours.- Dangle patient  times a day- Stand patient  times a day- Ambulate patient  times a day- Out of bed to chair  times a day - Out of bed for meals times a day- Out of bed for toileting- Record patient progress and toleration of activity level   Outcome: Progressing     Problem: DISCHARGE PLANNING  Goal: Discharge to home or other facility with appropriate resources  Description: INTERVENTIONS:- Identify barriers to discharge w/patient and caregiver- Arrange for needed discharge resources and transportation as appropriate- Identify discharge learning needs  (meds, wound care, etc.)- Arrange for interpretive services to assist at discharge as needed- Refer to Case Management Department for coordinating discharge planning if the patient needs post-hospital services based on physician/advanced practitioner order or complex needs related to functional status, cognitive ability, or social support system  Outcome: Progressing     Problem: Knowledge Deficit  Goal: Patient/family/caregiver demonstrates understanding of disease process, treatment plan, medications, and discharge instructions  Description: Complete learning assessment and assess knowledge base.Interventions:- Provide teaching at level of understanding- Provide teaching via preferred learning methods  Outcome: Progressing     Problem: Prexisting or High Potential for Compromised Skin Integrity  Goal: Skin integrity is maintained or improved  Description: INTERVENTIONS:- Identify patients at risk for skin breakdown- Assess and monitor skin integrity- Assess and monitor nutrition and hydration status- Monitor labs - Assess for incontinence - Turn and reposition patient- Assist with mobility/ambulation- Relieve pressure over bony prominences- Avoid friction and shearing- Provide appropriate hygiene as needed including keeping skin clean and dry- Evaluate need for skin moisturizer/barrier cream- Collaborate with interdisciplinary team - Patient/family teaching- Consider wound care consult   Outcome: Progressing

## 2025-04-29 NOTE — CASE MANAGEMENT
Case Management Progress Note    Patient name Irene Plascencia  Location [facility] 622-01 MRN [id number]  : 1960 Date 2025       LOS (days): 3  Geometric Mean LOS (GMLOS) (days): 3  Days to GMLOS:-0.3        OBJECTIVE:        Current admission status: Inpatient  Preferred Pharmacy:   [facility]/pharmacy #[id number] - [place], [place] - [address]  [address] [address]  Phone: [phone] Fax: [phone]    Primary Care Provider: Raheem Cain MD    [facility]  Secondary Insurance:     PROGRESS NOTE:    Pt insurance requires auth for [facility]. Auth submitted to [place] support and awaiting determination.

## 2025-04-29 NOTE — PLAN OF CARE
Problem: PAIN - ADULT  Goal: Verbalizes/displays adequate comfort level or baseline comfort level  Description: Interventions:- Encourage patient to monitor pain and request assistance- Assess pain using appropriate pain scale- Administer analgesics based on type and severity of pain and evaluate response- Implement non-pharmacological measures as appropriate and evaluate response- Consider cultural and social influences on pain and pain management- Notify physician/advanced practitioner if interventions unsuccessful or patient reports new pain  4/29/2025 1355 by Thais Barbosa RN  Outcome: Completed  4/29/2025 0720 by Thais Barbosa RN  Outcome: Progressing     Problem: INFECTION - ADULT  Goal: Absence or prevention of progression during hospitalization  Description: INTERVENTIONS:- Assess and monitor for signs and symptoms of infection- Monitor lab/diagnostic results- Monitor all insertion sites, i.e. indwelling lines, tubes, and drains- Monitor endotracheal if appropriate and nasal secretions for changes in amount and color- [place] appropriate cooling/warming therapies per order- Administer medications as ordered- Instruct and encourage patient and family to use good hand hygiene technique- Identify and instruct in appropriate isolation precautions for identified infection/condition  4/29/2025 1355 by Thais Barbosa RN  Outcome: Completed  4/29/2025 0720 by Thais Barbosa RN  Outcome: Progressing  Goal: Absence of fever/infection during neutropenic period  Description: INTERVENTIONS:- Monitor WBC  4/29/2025 1355 by Thais Barbosa RN  Outcome: Completed  4/29/2025 0720 by Thais Barbosa RN  Outcome: Progressing     Problem: SAFETY ADULT  Goal: Patient will remain free of falls  Description: INTERVENTIONS:- Educate patient/family on patient safety including physical limitations- Instruct patient to call for assistance with activity - Consult OT/PT to assist with strengthening/mobility - Keep Call bell  within reach- Keep bed low and locked with side rails adjusted as appropriate- Keep care items and personal belongings within reach- Initiate and maintain comfort rounds- Make Fall Risk Sign visible to staff- Offer Toileting every 2 Hours, in advance of need- Initiate/Maintain alarm- Obtain necessary fall risk management equipment: - Apply yellow socks and bracelet for high fall risk patients- Consider moving patient to room near nurses station  4/29/2025 1355 by Thais Barbosa RN  Outcome: Completed  4/29/2025 0720 by Thais Barbosa RN  Outcome: Progressing  Goal: Maintain or return to baseline ADL function  Description: INTERVENTIONS:-  Assess patient's ability to carry out ADLs; assess patient's baseline for ADL function and identify physical deficits which impact ability to perform ADLs (bathing, care of mouth/teeth, toileting, grooming, dressing, etc.)- Assess/evaluate cause of self-care deficits - Assess range of motion- Assess patient's mobility; develop plan if impaired- Assess patient's need for assistive devices and provide as appropriate- Encourage maximum independence but intervene and supervise when necessary- Involve family in performance of ADLs- Assess for home care needs following discharge - Consider OT consult to assist with ADL evaluation and planning for discharge- Provide patient education as appropriate  4/29/2025 1355 by Thais Barbosa RN  Outcome: Completed  4/29/2025 0720 by Thais Brabosa RN  Outcome: Progressing  Goal: Maintains/Returns to pre admission functional level  Description: INTERVENTIONS:- Perform AM-PAC 6 Click Basic Mobility/ Daily Activity assessment daily.- Set and communicate daily mobility goal to care team and patient/family/caregiver. - Collaborate with rehabilitation services on mobility goals if consulted- Perform Range of Motion 3 times a day.- Reposition patient every 2 hours.- Dangle patient 3 times a day- Stand patient 3 times a day- Ambulate patient 3 times a  day- Out of bed to chair 3 times a day - Out of bed for meals 3 times a day- Out of bed for toileting- Record patient progress and toleration of activity level   4/29/2025 1355 by Thais Barbosa RN  Outcome: Completed  4/29/2025 0720 by Thais Barbosa RN  Outcome: Progressing     Problem: DISCHARGE PLANNING  Goal: Discharge to home or other facility with appropriate resources  Description: INTERVENTIONS:- Identify barriers to discharge w/patient and caregiver- Arrange for needed discharge resources and transportation as appropriate- Identify discharge learning needs (meds, wound care, etc.)- Arrange for interpretive services to assist at discharge as needed- Refer to Case Management Department for coordinating discharge planning if the patient needs post-hospital services based on physician/advanced practitioner order or complex needs related to functional status, cognitive ability, or social support system  4/29/2025 1355 by Thais Barbosa RN  Outcome: Completed  4/29/2025 0720 by Thais Barbosa RN  Outcome: Progressing     Problem: Knowledge Deficit  Goal: Patient/family/caregiver demonstrates understanding of disease process, treatment plan, medications, and discharge instructions  Description: Complete learning assessment and assess knowledge base.Interventions:- Provide teaching at level of understanding- Provide teaching via preferred learning methods  4/29/2025 1355 by Thais Barbosa RN  Outcome: Completed  4/29/2025 0720 by Thais Barbosa RN  Outcome: Progressing     Problem: Prexisting or High Potential for Compromised Skin Integrity  Goal: Skin integrity is maintained or improved  Description: INTERVENTIONS:- Identify patients at risk for skin breakdown- Assess and monitor skin integrity- Assess and monitor nutrition and hydration status- Monitor labs - Assess for incontinence - Turn and reposition patient- Assist with mobility/ambulation- Relieve pressure over bony prominences- Avoid friction and  shearing- Provide appropriate hygiene as needed including keeping skin clean and dry- Evaluate need for skin moisturizer/barrier cream- Collaborate with interdisciplinary team - Patient/family teaching- Consider wound care consult   4/29/2025 1355 by Thais Barbosa RN  Outcome: Completed  4/29/2025 0720 by Thais Barbosa RN  Outcome: Progressing

## 2025-04-29 NOTE — CASE MANAGEMENT
Case Management Discharge Planning Note    Patient name Irene Plascencia  Location [facility] 622/[facility] 622-01 MRN [id number]  : 1960 Date 2025       Current Admission Date: 2025  Current Admission Diagnosis:Intractable nausea and vomiting   Patient Active Problem List    Diagnosis Date Noted Date Diagnosed    Acute blood loss anemia (ABLA) 2025     Problems related to living alone 2025     Lack of social support 2025     Generalized weakness 2025     Ambulatory dysfunction 2025     Leukopenia 2024     Heartburn 2024     Gastroparesis 2024     Elevated vitamin B12 level 2024     Iron deficiency 2024     Acquired absence of other toe(s), unspecified side (HCC) 2024     Osteomyelitis, unspecified site, unspecified type (HCC) 2024     Vitamin B12 deficiency (dietary) anemia 2023     Folate deficiency 2023     Neutropenia (HCC) 2023     Post concussion syndrome 2023     Sepsis without acute organ dysfunction (HCC) 2023     Headache 2023     Nutritional anemia 2023     Stress incontinence 2023     Ulcer of toe, chronic, left, with unspecified severity (HCC) 2023     Thrombocytosis 2022     Abdominal pain 2022     Schreiber's esophagus 2022     Microcytic anemia 2022     Self neglect 2022     Pruritus 2022     Hematemesis 01/10/2022     Intractable nausea and vomiting 10/22/2021     Hyperlipidemia 2021     Word finding difficulty 2021     Hiatal hernia with GERD without esophagitis 2021     Polyp of colon 2021     Melena 2020     Cellulitis of left upper extremity 2020     Gastroesophageal reflux disease with esophagitis, Schreiber's esophagus and gastritis 2020     Rash 2020     Iron deficiency anemia 2020     Multiple excoriations 2020     Dizziness 2020     Fall 2020      Esophagitis 01/27/2020     Problem related to living arrangement 01/27/2020     Hypokalemia      NMS (neuroleptic malignant syndrome) 01/03/2020     Elevated lactic acid level 01/03/2020     Preop exam for internal medicine 11/18/2019     Severe iron deficiency anemia  11/14/2019     Chronic bilateral low back pain without sciatica 11/01/2019     Right hip pain 07/15/2019     Primary hypertension 06/12/2019     Dermal hypersensitivity reaction 11/08/2018     Psychiatric disorder 08/21/2018     Schizoaffective disorder (HCC) 08/16/2018     Serotonin syndrome 08/13/2018     Other fatigue 06/19/2018     Spinal stenosis of lumbar region with neurogenic claudication 06/15/2018     Lumbar spondylosis 06/15/2018     T12 compression fracture (HCC) 06/15/2018     Synovial cyst of lumbar facet joint 06/15/2018     Localized osteoporosis with current pathological fracture with routine healing 05/25/2018     Lumbar radiculopathy 03/19/2018     DDD (degenerative disc disease), lumbar 03/19/2018     Spondylolisthesis at L4-L5 level 03/19/2018     Anxiety 10/31/2016     Acquired hypothyroidism 10/31/2016     Other constipation 04/10/2014     Bulimia nervosa in remission 03/19/2014       LOS (days): 3  Geometric Mean LOS (GMLOS) (days): 3  Days to GMLOS:-0.4     OBJECTIVE:  Risk of Unplanned Readmission Score: 25.05   Current admission status: Inpatient   Preferred Pharmacy:   [id number] - [place], [place] - [address]  [address] [address]  Phone: [phone] Fax: [phone]    Primary Care Provider: Raheem Cain MD    [facility]  Secondary Insurance:     DISCHARGE DETAILS:    Discharge planning discussed with:: patient  Freedom of Choice: Yes  Comments - Freedom of Choice: Discussed FOC  CM contacted family/caregiver?: No- see comments  Were Treatment Team discharge recommendations reviewed with patient/caregiver?: Yes  Did  patient/caregiver verbalize understanding of patient care needs?: Yes  Were patient/caregiver advised of the risks associated with not following Treatment Team discharge recommendations?: Yes    Other Referral/Resources/Interventions Provided:  Financial Resources Provided: Indigent Transportation  Referral Comments: Pt requested transport home. Indigent transport provided.    Treatment Team Recommendation: Home with Home Health Care  Discharge Destination Plan:: Home with Home Health Care  Transport at Discharge : Ride Share     Number/Name of Dispatcher: [facility]  Transported by (Company and Unit #): [employer]  ETA of Transport (Date): 04/29/25  ETA of Transport (Time): 1430    IMM Given (Date):: 04/29/25  IMM Given to:: Patient  IMM reviewed with patient, patient agrees with discharge determination.

## 2025-04-29 NOTE — PROGRESS NOTES
Father Karime gave a blessing with prayer.      04/29/25 1500   Clinical Encounter Type   Visited With Patient   [facility] Encounters   [facility] Needs Prayer

## 2025-04-29 NOTE — PLAN OF CARE
Problem: PAIN - ADULT  Goal: Verbalizes/displays adequate comfort level or baseline comfort level  Description: Interventions:- Encourage patient to monitor pain and request assistance- Assess pain using appropriate pain scale- Administer analgesics based on type and severity of pain and evaluate response- Implement non-pharmacological measures as appropriate and evaluate response- Consider cultural and social influences on pain and pain management- Notify physician/advanced practitioner if interventions unsuccessful or patient reports new pain  Outcome: Progressing     Problem: INFECTION - ADULT  Goal: Absence or prevention of progression during hospitalization  Description: INTERVENTIONS:- Assess and monitor for signs and symptoms of infection- Monitor lab/diagnostic results- Monitor all insertion sites, i.e. indwelling lines, tubes, and drains- Monitor endotracheal if appropriate and nasal secretions for changes in amount and color- [place] appropriate cooling/warming therapies per order- Administer medications as ordered- Instruct and encourage patient and family to use good hand hygiene technique- Identify and instruct in appropriate isolation precautions for identified infection/condition  Outcome: Progressing  Goal: Absence of fever/infection during neutropenic period  Description: INTERVENTIONS:- Monitor WBC  Outcome: Progressing     Problem: SAFETY ADULT  Goal: Patient will remain free of falls  Description: INTERVENTIONS:- Educate patient/family on patient safety including physical limitations- Instruct patient to call for assistance with activity - Consult OT/PT to assist with strengthening/mobility - Keep Call bell within reach- Keep bed low and locked with side rails adjusted as appropriate- Keep care items and personal belongings within reach- Initiate and maintain comfort rounds- Make Fall Goal: Maintain or return to baseline ADL function  Description: INTERVENTIONS:-  Assess patient's ability to  carry out ADLs; assess patient's baseline for ADL function and identify physical deficits which impact ability to perform ADLs (bathing, care of mouth/teeth, toileting, grooming, dressing, etc.)- Assess/evaluate cause of self-care deficits - Assess range of motion- Assess patient's mobility; develop plan if impaired- Assess patient's need for assistive devices and provide as appropriate- Encourage maximum independence but intervene and supervise when necessary- Involve family in performance of ADLs- Assess for home care needs following discharge - Consider OT consult to assist with ADL evaluation and planning for discharge- Provide patient education as appropriate  Outcome: Progressing  Goal: Maintains/Returns to pre admission functional level  Description: INTERVENTIONS:- Perform AM-PAC 6 Click Basic Mobility/ Daily Activity assessment daily.- Set and communicate daily mobility goal to care team and patient/family/caregiver. - Collaborate with rehabilitation services on mobility    Problem: DISCHARGE PLANNING  Goal: Discharge to home or other facility with appropriate resources  Description: INTERVENTIONS:- Identify barriers to discharge w/patient and caregiver- Arrange for needed discharge resources and transportation as appropriate- Identify discharge learning needs (meds, wound care, etc.)- Arrange for interpretive services to assist at discharge as needed- Refer to Case Management Department for coordinating discharge planning if the patient needs post-hospital services based on physician/advanced practitioner order or complex needs related to functional status, cognitive ability, or social support system  Outcome: Progressing     Problem: Knowledge Deficit  Goal: Patient/family/caregiver demonstrates understanding of disease process, treatment plan, medications, and discharge instructions  Description: Complete learning assessment and assess knowledge base.Interventions:- Provide teaching at level of  understanding- Provide teaching via preferred learning methods  Outcome: Progressing     Problem: Prexisting or High Potential for Compromised Skin Integrity  Goal: Skin integrity is maintained or improved  Description: INTERVENTIONS:- Identify patients at risk for skin breakdown- Assess and monitor skin integrity- Assess and monitor nutrition and hydration status- Monitor labs - Assess for incontinence - Turn and reposition patient- Assist with mobility/ambulation- Relieve pressure over bony prominences- Avoid friction and shearing- Provide appropriate hygiene as needed including keeping skin clean and dry- Evaluate need for skin moisturizer/barrier cream- Collaborate with interdisciplinary team - Patient/family teaching- Consider wound care consult   Outcome: Progressing

## 2025-04-29 NOTE — ASSESSMENT & PLAN NOTE
Patient reports having multiple episodes of hematemesis.  Has vomited at least twice in the ER with no bloody emesis noted  With acute on chronic anemia, s/p 1 unit PRBC   CT abdomen and pelvis notable for esophagitis similar to prior findings on EGD  EGD 1 month ago showed grade D esophagitis  GI consulted, repeat EGD on 4/28 with grade D esophagitis.  Recommend PPI twice daily, Carafate.  Should have follow-up with thoracic on discharge to discuss hiatal hernia repair  Holding Celebrex/NSAIDs, recommend avoiding these on discharge as well

## 2025-04-29 NOTE — ASSESSMENT & PLAN NOTE
Lab Results   Component Value Date    IRON 14 (L) 04/26/2025    FERRITIN 7 (L) 04/26/2025    TIBC 625.8 (H) 04/26/2025    CONCFE 2 (L) 04/26/2025     Recent Labs     04/27/25  0556 04/28/25  0516 04/29/25  0523   HGB 7.4* 6.9* 8.9*     Noted on admission.  Due to grade D esophagitis, most likely, reported hematemesis   S/p IV venofer this admission  Outpt f/u

## 2025-04-29 NOTE — DISCHARGE SUPPORT
Patient's insurance plan requires [facility] auth through [employer] Portal. Auth request successfully submitted via [employer] portal. No pending ref# rec'd at this time. Ticket submitted to [employer] to obtain pending ref#.    YASMIN notified: Parviz MANZO

## 2025-04-29 NOTE — PROGRESS NOTES
Patient:    MRN:  [id number]    Annin Request ID:  [id number]    Level of care reserved:  Home Health Agency    Partner Reserved:  [facility], [place] [address] [phone]    Clinical needs requested:    Geography searched:  [id number]    Start of Service:    Request sent:  1:37pm EDT on 4/28/2025 by Parviz Cedeño    Partner reserved:  2:45pm EDT on 4/28/2025 by Parviz Cedeño    Choice list shared:  2:45pm EDT on 4/28/2025 by Parviz Cedeño

## 2025-04-29 NOTE — ASSESSMENT & PLAN NOTE
Due to hematemesis. Iron deficiency largely driven by blood loss from grade D esophagitis as described elsewhere.    Recent Labs     04/27/25  0556 04/28/25  0516 04/29/25  0523   HGB 7.4* 6.9* 8.9*       Plan:  S/p 1u pRBC on 4/28  Continue BID PPI  S/p IV venofer

## 2025-04-29 NOTE — DISCHARGE SUPPORT
Call made into [employer] to check status of [facility] referral/auth. Spoke to Zoya who stated auth was approved-    Auth # [id number]  SOC: 4/29/25  [facility] Company: MARCIA RUSSO CM aware: Parviz MANZO

## 2025-04-29 NOTE — DISCHARGE SUMMARY
Discharge Summary - Hospitalist   Name: Irene Plascencia 64 y.o. female I MRN: [id number]  [facility] I Date of Admission: 4/25/2025   Date of Service: 4/29/2025 I Hospital Day: 3     Assessment & Plan  Intractable nausea and vomiting  Patient reports complaints of intractable nausea and vomiting which has since resolved with multiple rounds of Zofran in ER.  Also states has not had a bowel movement for 3 days.  Also reported hematemesis, see related plan   GI consulted, EGD on 4/28 with grade D esophagitis.  Biopsies taken to r/o H pylori.  Rec PPI BID, antiemetic, carafate.  Should have surgery f/u to evaluate for hiatal hernia repair  Tolerating diet at this time.  Stable for d/c  Bowel regimen prn   Hematemesis  Patient reports having multiple episodes of hematemesis.  Has vomited at least twice in the ER with no bloody emesis noted  With acute on chronic anemia, s/p 1 unit PRBC   CT abdomen and pelvis notable for esophagitis similar to prior findings on EGD  EGD 1 month ago showed grade D esophagitis  GI consulted, repeat EGD on 4/28 with grade D esophagitis.  Recommend PPI twice daily, Carafate.  Should have follow-up with thoracic on discharge to discuss hiatal hernia repair  Holding Celebrex/NSAIDs, recommend avoiding these on discharge as well  Acute blood loss anemia (ABLA)  Due to hematemesis. Iron deficiency largely driven by blood loss from grade D esophagitis as described elsewhere.    Recent Labs     04/27/25  0556 04/28/25  0516 04/29/25  0523   HGB 7.4* 6.9* 8.9*       Plan:  S/p 1u pRBC on 4/28  Continue BID PPI  S/p IV venofer   Severe iron deficiency anemia   Lab Results   Component Value Date    IRON 14 (L) 04/26/2025    FERRITIN 7 (L) 04/26/2025    TIBC 625.8 (H) 04/26/2025    CONCFE 2 (L) 04/26/2025     Recent Labs     04/27/25  0556 04/28/25  0516 04/29/25  0523   HGB 7.4* 6.9* 8.9*     Noted on admission.  Due to grade D esophagitis, most likely, reported hematemesis   S/p IV  venofer this admission  Outpt f/u   Gastroesophageal reflux disease with esophagitis, Schreiber's esophagus and gastritis  Appears to have acute flare of known esophagitis  PPI, carafate  See related plans   Gastroparesis  Continue with Reglan 3 times daily with meals  Primary hypertension  BP stable   Schizoaffective disorder (HCC)  Continue with Seroquel, Paxil  Other constipation  Bowel regimen      Medical Problems       Resolved Problems  Date Reviewed: 4/26/2025          Resolved    Hyponatremia 4/28/2025     Resolved by  Roslyn Dillon MD        Discharging Physician / Practitioner: Yareli Lewis PA-C  PCP: Raheem Cain MD  Admission Date:   Admission Orders (From admission, onward)       Ordered        04/26/25 0341  INPATIENT ADMISSION  Once                          Discharge Date: 04/29/25    Consultations During Hospital Stay:  GI     Procedures Performed:   EGD 4/28     Significant Findings / Test Results:   As above     Incidental Findings:   none     Test Results Pending at Discharge (will require follow up):   Biopsies      Outpatient Tests Requested:  none    Complications:  None    Reason for Admission: intractable n/v, hematemesis     Hospital Course:   Irene Plascencia is a 64 y.o. female patient who originally presented to the hospital on 4/25/2025 due to intractable n/v, hematemesis.  She was provided with supportive care and seen by GI.  She had repeat EGD on 4/28 given hx of severe esophagitis, hematemesis with acute on chronic anemia requiring 1 unit PRBC this admission.  Again demonstrated grade D esophagitis.  They recommend to continue PPI twice daily, Carafate.  She should have thoracic follow-up on discharge to discuss hiatal hernia repair.  He was provided with IV Venofer as well during hospitalization.  She is now tolerating diet at this time, with no further nausea or vomiting.  She is stable for discharge with outpatient follow-up.    Please see above list of diagnoses and  "related plan for additional information.     Condition at Discharge: good    Discharge Day Visit / Exam:   Subjective:  No acute complaints at this time, no further n/v.  Tolerating diet.    Vitals: Blood Pressure: 122/80 (04/29/25 1040)  Pulse: 88 (04/29/25 1040)  Temperature: 97.9 °F (36.6 °C) (04/29/25 1040)  Temp Source: Axillary (04/29/25 1040)  Respirations: 14 (04/29/25 1040)  Height: 5' 2\" (157.5 cm) (04/26/25 0600)  Weight - Scale: 80.2 kg (176 lb 12.9 oz) (04/26/25 0600)  SpO2: 95 % (04/29/25 1040)  Physical Exam  Vitals reviewed.   Constitutional:       General: She is not in acute distress.     Appearance: She is not toxic-appearing.   HENT:      Head: Normocephalic and atraumatic.   Eyes:      Extraocular Movements: Extraocular movements intact.   Cardiovascular:      Rate and Rhythm: Normal rate and regular rhythm.   Pulmonary:      Effort: Pulmonary effort is normal.   Abdominal:      General: Bowel sounds are normal. There is no distension.      Palpations: Abdomen is soft.   Musculoskeletal:         General: Normal range of motion.   Neurological:      General: No focal deficit present.      Mental Status: She is alert and oriented to person, place, and time.   Psychiatric:         Mood and Affect: Mood normal.         Behavior: Behavior normal.        Discussion with Family: Patient declined call to .     Discharge instructions/Information to patient and family:   See after visit summary for information provided to patient and family.      Provisions for Follow-Up Care:  See after visit summary for information related to follow-up care and any pertinent home health orders.      Mobility at time of Discharge:   Basic Mobility Inpatient Raw Score: 18  JH-HLM Goal: 6: Walk 10 steps or more  JH-HLM Achieved: 6: Walk 10 steps or more  HLM Goal achieved. Continue to encourage appropriate mobility.     Disposition:   Home    Planned Readmission: no    Discharge Medications:  See after visit " summary for reconciled discharge medications provided to patient and/or family.      Administrative Statements   Discharge Statement:  I have spent a total time of 32 minutes in caring for this patient on the day of the visit/encounter. >30 minutes of time was spent on: Instructions for management, Patient and family education, Counseling / Coordination of care, Documenting in the medical record, Reviewing / ordering tests, medicine, procedures  , and Communicating with other healthcare professionals .    **Please Note: This note may have been constructed using a voice recognition system**

## 2025-04-29 NOTE — DISCHARGE SUPPORT
11:43 am  Call made into [facility] P# [phone], spoke to Norma who stated she is unable to view [facility] request online. Per Norma, another provided complained of same issue today and advised to try other upload portal area with form/clinicals.     Auth request re-submitted in alternate area.    Cm aware: Parviz MANZO    11:56 am  Rec'd upload confirmation # [id number]

## 2025-04-30 ENCOUNTER — HOME CARE VISIT (OUTPATIENT)
Dept: HOME HEALTH SERVICES | Facility: HOME HEALTHCARE | Age: 65
End: 2025-04-30

## 2025-04-30 ENCOUNTER — TRANSITIONAL CARE MANAGEMENT (OUTPATIENT)
Dept: INTERNAL MEDICINE CLINIC | Facility: CLINIC | Age: 65
End: 2025-04-30

## 2025-04-30 DIAGNOSIS — F25.1 SCHIZOAFFECTIVE DISORDER, DEPRESSIVE TYPE (HCC): ICD-10-CM

## 2025-04-30 DIAGNOSIS — F41.9 ANXIETY: ICD-10-CM

## 2025-04-30 DIAGNOSIS — F99 PSYCHIATRIC DISORDER: ICD-10-CM

## 2025-04-30 NOTE — UTILIZATION REVIEW
NOTIFICATION OF ADMISSION DISCHARGE   This is a Notification of Discharge from [facility]. Please be advised that this patient has been discharge from our facility. Below you will find the admission and discharge date and time including the patient’s disposition.   UTILIZATION REVIEW CONTACT:  Utilization Review Assistants  Network Utilization Review Department  Phone: [phone] x carefully listen to the prompts. All voicemails are confidential.  Email: [email or web address]     ADMISSION INFORMATION  PRESENTATION DATE: 4/25/2025 10:31 PM  OBERVATION ADMISSION DATE: N/A  INPATIENT ADMISSION DATE: 4/26/25  3:41 AM   DISCHARGE DATE: 4/29/2025  2:40 PM   DISPOSITION:Home with Home Health Care    [facility] Utilization Review Department  ATTENTION: Please call with any questions or concerns to [phone] and carefully listen to the prompts so that you are directed to the right person. All voicemails are confidential.   For Discharge needs, contact Care Management DC Support Team at [phone] opt. 2  Send all requests for admission clinical reviews, approved or denied determinations and any other requests to dedicated fax number below belonging to the campus where the patient is receiving treatment. List of dedicated fax numbers for the Facilities:  FACILITY NAME UR FAX NUMBER   ADMISSION DENIALS (Administrative/Medical Necessity) [phone]   DISCHARGE SUPPORT TEAM ([facility]) [phone]   PARENT CHILD HEALTH (Maternity/NICU/Pediatrics) [phone]   [facility] [phone]   [facility] [phone]   [facility] [phone]   [facility] [phone]   Critical access hospital [phone]   [facility] [phone]   [facility] [phone]   [facility] [phone]    [facility] [phone]   [facility] [phone]   [facility] [phone]   [facility] [phone]

## 2025-05-01 ENCOUNTER — HOME CARE VISIT (OUTPATIENT)
Dept: HOME HEALTH SERVICES | Facility: HOME HEALTHCARE | Age: 65
End: 2025-05-01

## 2025-05-01 VITALS — HEART RATE: 97 BPM | OXYGEN SATURATION: 94 % | SYSTOLIC BLOOD PRESSURE: 132 MMHG | DIASTOLIC BLOOD PRESSURE: 70 MMHG

## 2025-05-01 PROCEDURE — 88305 TISSUE EXAM BY PATHOLOGIST: CPT | Performed by: STUDENT IN AN ORGANIZED HEALTH CARE EDUCATION/TRAINING PROGRAM

## 2025-05-01 RX ORDER — PAROXETINE 30 MG/1
60 TABLET, FILM COATED ORAL DAILY
Qty: 180 TABLET | Refills: 1 | OUTPATIENT
Start: 2025-05-01

## 2025-05-01 RX ORDER — QUETIAPINE 400 MG/1
400 TABLET, FILM COATED, EXTENDED RELEASE ORAL
Qty: 90 TABLET | Refills: 3 | Status: SHIPPED | OUTPATIENT
Start: 2025-05-01

## 2025-05-02 ENCOUNTER — RESULTS FOLLOW-UP (OUTPATIENT)
Dept: GASTROENTEROLOGY | Facility: CLINIC | Age: 65
End: 2025-05-02

## 2025-05-02 ENCOUNTER — OFFICE VISIT (OUTPATIENT)
Dept: INTERNAL MEDICINE CLINIC | Facility: CLINIC | Age: 65
End: 2025-05-02
Payer: COMMERCIAL

## 2025-05-02 VITALS
DIASTOLIC BLOOD PRESSURE: 80 MMHG | WEIGHT: 173 LBS | TEMPERATURE: 98.7 F | SYSTOLIC BLOOD PRESSURE: 132 MMHG | OXYGEN SATURATION: 97 % | BODY MASS INDEX: 31.64 KG/M2 | HEART RATE: 96 BPM

## 2025-05-02 DIAGNOSIS — L97.529: ICD-10-CM

## 2025-05-02 DIAGNOSIS — K21.9 HIATAL HERNIA WITH GERD WITHOUT ESOPHAGITIS: ICD-10-CM

## 2025-05-02 DIAGNOSIS — R11.2 NAUSEA AND VOMITING, UNSPECIFIED VOMITING TYPE: ICD-10-CM

## 2025-05-02 DIAGNOSIS — K44.9 HIATAL HERNIA WITH GERD WITHOUT ESOPHAGITIS: ICD-10-CM

## 2025-05-02 DIAGNOSIS — E03.9 ACQUIRED HYPOTHYROIDISM: ICD-10-CM

## 2025-05-02 DIAGNOSIS — F25.9 SCHIZOAFFECTIVE DISORDER, UNSPECIFIED TYPE (HCC): ICD-10-CM

## 2025-05-02 DIAGNOSIS — R11.2 INTRACTABLE NAUSEA AND VOMITING: ICD-10-CM

## 2025-05-02 DIAGNOSIS — D50.9 IRON DEFICIENCY ANEMIA, UNSPECIFIED IRON DEFICIENCY ANEMIA TYPE: ICD-10-CM

## 2025-05-02 DIAGNOSIS — K20.90 ESOPHAGITIS: Primary | ICD-10-CM

## 2025-05-02 DIAGNOSIS — I10 PRIMARY HYPERTENSION: ICD-10-CM

## 2025-05-02 DIAGNOSIS — R42 DIZZINESS: ICD-10-CM

## 2025-05-02 PROCEDURE — 99496 TRANSJ CARE MGMT HIGH F2F 7D: CPT | Performed by: INTERNAL MEDICINE

## 2025-05-02 RX ORDER — ONDANSETRON 4 MG/1
4 TABLET, FILM COATED ORAL EVERY 8 HOURS PRN
Qty: 20 TABLET | Refills: 3 | Status: SHIPPED | OUTPATIENT
Start: 2025-05-02

## 2025-05-02 RX ORDER — MECLIZINE HYDROCHLORIDE 25 MG/1
25 TABLET ORAL EVERY 8 HOURS PRN
Qty: 30 TABLET | Refills: 5 | Status: SHIPPED | OUTPATIENT
Start: 2025-05-02

## 2025-05-02 NOTE — ASSESSMENT & PLAN NOTE
Orders:  •  meclizine (ANTIVERT) 25 mg tablet; Take 1 tablet (25 mg total) by mouth every 8 (eight) hours as needed for dizziness

## 2025-05-02 NOTE — ASSESSMENT & PLAN NOTE
Improving, patient does have Zofran to take as needed, she is going to follow-up with both GI and thoracic surgery with her hiatal hernia

## 2025-05-02 NOTE — PATIENT INSTRUCTIONS
Problem List Items Addressed This Visit          Cardiovascular and Mediastinum    Primary hypertension    Blood pressure is controlled, continue amlodipine            Respiratory    Hiatal hernia with GERD without esophagitis    Follow-up thoracic surgery, patient has appointment May 12            Digestive    Esophagitis - Primary    Continue twice a day proton pump and Imdur as per GI along with Carafate, we will recheck CBC         Intractable nausea and vomiting    Improving, patient does have Zofran to take as needed, she is going to follow-up with both GI and thoracic surgery with her hiatal hernia         Relevant Medications    ondansetron (ZOFRAN) 4 mg tablet       Endocrine    Acquired hypothyroidism    Continue levothyroxine along with monitoring            Behavioral Health    Schizoaffective disorder (HCC)    Continue meds and follow-up psychiatry            Blood    Iron deficiency anemia      Patient just had 1 unit of packed red blood cells while in the hospital along with an IV iron infusion, follow-up GI, I also recommend over-the-counter iron supplement daily         Relevant Orders    CBC and differential       Surgery/Wound/Pain    Ulcer of toe, chronic, left, with unspecified severity (HCC)    improved            Neurology/Sleep    Dizziness    Relevant Medications    meclizine (ANTIVERT) 25 mg tablet     Other Visit Diagnoses         Nausea and vomiting, unspecified vomiting type        Relevant Medications    ondansetron (ZOFRAN) 4 mg tablet

## 2025-05-02 NOTE — ASSESSMENT & PLAN NOTE
Patient just had 1 unit of packed red blood cells while in the hospital along with an IV iron infusion, follow-up GI, I also recommend over-the-counter iron supplement daily  Orders:  •  CBC and differential; Future

## 2025-05-02 NOTE — PROGRESS NOTES
Transition of Care Visit:  Name: Irene Plascencia      : 1960      MRN: [id number]  Encounter Provider: Raheem Cain MD  Encounter Date: 2025   Encounter department: [facility]    Assessment & Plan  Esophagitis  Continue twice a day proton pump and Imdur as per GI along with Carafate, we will recheck CBC       Iron deficiency anemia, unspecified iron deficiency anemia type    Patient just had 1 unit of packed red blood cells while in the hospital along with an IV iron infusion, follow-up GI, I also recommend over-the-counter iron supplement daily  Orders:  •  CBC and differential; Future    Intractable nausea and vomiting  Improving, patient does have Zofran to take as needed, she is going to follow-up with both GI and thoracic surgery with her hiatal hernia       Hiatal hernia with GERD without esophagitis  Follow-up thoracic surgery, patient has appointment May 12       Dizziness    Orders:  •  meclizine (ANTIVERT) 25 mg tablet; Take 1 tablet (25 mg total) by mouth every 8 (eight) hours as needed for dizziness    Ulcer of toe, chronic, left, with unspecified severity (HCC)  improved       Acquired hypothyroidism  Continue levothyroxine along with monitoring       Primary hypertension  Blood pressure is controlled, continue amlodipine       Schizoaffective disorder, unspecified type (HCC)  Continue meds and follow-up psychiatry       Nausea and vomiting, unspecified vomiting type    Orders:  •  ondansetron (ZOFRAN) 4 mg tablet; Take 1 tablet (4 mg total) by mouth every 8 (eight) hours as needed for nausea or vomiting         History of Present Illness     Transitional Care Management Review:   Irene Plascencia is a 64 y.o. female here for TCM follow up.     During the TCM phone call patient stated:  TCM Call (since 2025)     Date and time call was made  2025  8:56 AM    Hospital care reviewed  Records reviewed    Patient was hospitialized at  [facility]  [place]    Date of Admission  04/25/25    Date of discharge  04/29/25    Diagnosis  Intractable nausea and vomiting    Disposition  Home    Were the patients medications reviewed and updated  Yes      TCM Call (since 4/18/2025)     Scheduled for follow up?  Yes    I have advised the patient to call PCP with any new or worsening symptoms  Ying PALMER I reviewed patient's hospitalization for nausea and vomiting and hematemesis.  Patient was seen by GI, had a unit of packed red blood cells, and IV iron infusion.  Is recommended she follow-up with thoracic surgery for the hiatal hernia, and she has an appointment coming up May 12 with them      Review of Systems   Constitutional:  Positive for fatigue.   Respiratory:  Negative for shortness of breath.    Cardiovascular:  Negative for chest pain.   Gastrointestinal:  Positive for abdominal pain (occasional) and nausea. Negative for constipation, diarrhea and vomiting.   Neurological:  Positive for dizziness.     Objective   /80 (BP Location: Left arm, Patient Position: Sitting, Cuff Size: Standard)   Pulse 96   Temp 98.7 °F (37.1 °C) (Tympanic)   Wt 78.5 kg (173 lb)   SpO2 97%   BMI 31.64 kg/m²     Physical Exam  Vitals reviewed.   Constitutional:       Appearance: Normal appearance. She is well-developed.   HENT:      Head: Normocephalic and atraumatic.      Right Ear: External ear normal.      Left Ear: External ear normal.      Nose: Nose normal.   Eyes:      General: No scleral icterus.     Conjunctiva/sclera: Conjunctivae normal.   Neck:      Thyroid: No thyromegaly.      Trachea: No tracheal deviation.   Cardiovascular:      Rate and Rhythm: Normal rate and regular rhythm.      Heart sounds: Normal heart sounds. No murmur heard.  Pulmonary:      Effort: No respiratory distress.      Breath sounds: Normal breath sounds. No wheezing or rales.   Musculoskeletal:      Cervical back: Normal range of motion and neck supple.      Right lower leg:  No edema.      Left lower leg: No edema.   Lymphadenopathy:      Cervical: No cervical adenopathy.   Skin:     Coloration: Skin is not jaundiced.   Neurological:      Mental Status: She is alert and oriented to person, place, and time.   Psychiatric:         Behavior: Behavior normal.         Thought Content: Thought content normal.         Judgment: Judgment normal.       Medications have been reviewed by provider in current encounter

## 2025-05-06 ENCOUNTER — OFFICE VISIT (OUTPATIENT)
Dept: GASTROENTEROLOGY | Facility: CLINIC | Age: 65
End: 2025-05-06
Payer: COMMERCIAL

## 2025-05-06 VITALS
DIASTOLIC BLOOD PRESSURE: 78 MMHG | BODY MASS INDEX: 32.76 KG/M2 | WEIGHT: 178 LBS | HEIGHT: 62 IN | SYSTOLIC BLOOD PRESSURE: 126 MMHG | TEMPERATURE: 98.8 F

## 2025-05-06 DIAGNOSIS — K44.9 HIATAL HERNIA WITH GERD AND ESOPHAGITIS: Primary | ICD-10-CM

## 2025-05-06 DIAGNOSIS — K63.5 POLYP OF COLON, UNSPECIFIED PART OF COLON, UNSPECIFIED TYPE: ICD-10-CM

## 2025-05-06 DIAGNOSIS — K22.70 BARRETT'S ESOPHAGUS WITHOUT DYSPLASIA: ICD-10-CM

## 2025-05-06 DIAGNOSIS — K31.84 GASTROPARESIS: ICD-10-CM

## 2025-05-06 DIAGNOSIS — K21.00 HIATAL HERNIA WITH GERD AND ESOPHAGITIS: Primary | ICD-10-CM

## 2025-05-06 PROCEDURE — 99214 OFFICE O/P EST MOD 30 MIN: CPT | Performed by: INTERNAL MEDICINE

## 2025-05-06 PROCEDURE — G2211 COMPLEX E/M VISIT ADD ON: HCPCS | Performed by: INTERNAL MEDICINE

## 2025-05-06 NOTE — PROGRESS NOTES
Name: Irene Plascencia      : 1960      MRN: [id number]  Encounter Provider: Rosa Huynh DO  Encounter Date: 2025   Encounter department: [facility] [place]  :  Assessment & Plan  Hiatal hernia with GERD and esophagitis  S/p cTIF in  - Now with hiatal hernia recurrence and persistent Grade D esophagitis. Patient symptomatic from hernia and esophagitis. Currently with follow up scheduled with thoracic surgery  Recommend patient see Dr. Cedeño given both hiatal hernia and severe gastroparesis. Advised that fixing hiatal hernia could exacerbate gastroparesis and would recommend considering pyloroplasty.   Patient does have history of ineffective esophageal motility, but currently dysphagia is not bothersome given her altered gastroparesis diet  Continue Omeprazole bid - repeat EGD in 2-3 months and consider Voquezna if no improvement in esophagitis  Orders:    EGD; Future    Ambulatory Referral to General Surgery; Future    Schreiber's esophagus without dysplasia  Documented history of Schreiber's esophagus. However, all available biopsies dating back to  available for review show no evidence of Schreiber's esophagus. Unclear if this diagnosis predated EMR, but fortunately patient without Schreiber's esophagus on biopsy most recently as 2024.        Gastroparesis  Gastric emptying study 2023 with 29% emptying at 4 hours. Currently tolerable on reglan with meals. Does have episodes of intractable N/V associated with occasional hematemesis thought to be related to severe esophagitis  Surgical referral as above  Continue reglan - discussed side effects to monitor for. QTC okay on EKG during recent hospitalization  PRN zofran  Orders:    Ambulatory Referral to General Surgery; Future    Polyp of colon, unspecified part of colon, unspecified type  Due for repeat colonoscopy 2025           History of Present Illness   Irene Plascencia is a 64 y.o. female PMH of  severe gastroparesis, GERD with esophagitis, paraesophageal/hiatal hernia s/p laparoscopic hernia repair/cTIF in 2021 with Dr. Cedeño/Dr. Cadet,  Schreiber's esophagus who presents to GI Clinic for follow up from recent hospitalization    Patient was recently admitted 4/25 through 4/29 for intractable nausea and vomiting with few episodes of hematemesis.  She is status post repeat EGD on 4/28/2025 significant for grade D esophagitis, 5 cm hiatal hernia.  Negative gastric biopsies obtained.  At that time, she was recommended to see thoracic surgery for evaluation of her hiatal hernia.  Currently scheduled with Dr. Murillo on 5/2    Last gastric emptying study obtained 1/26/2023 significant for 29% emptying at 4 hours. Currently being managed on Reglan 5mg TID. Also using PRN zofran since recent hospitalization- taking once every 2-3 days with improvement in her nausea.     During hospitalization she was switched from Pantoprazole to Omeprazole for better management of esophagitis  HPI    Review of Systems A complete review of systems is negative other than that noted above in the HPI.      Current Outpatient Medications   Medication Sig Dispense Refill    amLODIPine (NORVASC) 5 mg tablet Take 1 tablet (5 mg total) by mouth daily Hold if your blood pressure is <110/70 90 tablet 0    levothyroxine 25 mcg tablet Take 1 tablet (25 mcg total) by mouth daily 90 tablet 1    LORazepam (ATIVAN) 2 mg tablet Take 1 tablet (2 mg total) by mouth every 6 (six) hours as needed for anxiety Max 5 times a day 180 tablet 1    meclizine (ANTIVERT) 25 mg tablet Take 1 tablet (25 mg total) by mouth every 8 (eight) hours as needed for dizziness 30 tablet 5    metoclopramide (REGLAN) 5 mg tablet Take 1 tablet (5 mg total) by mouth 3 (three) times a day before meals 90 tablet 1    omeprazole (PriLOSEC) 40 MG capsule Take 1 capsule (40 mg total) by mouth 2 (two) times a day 60 capsule 0    ondansetron (ZOFRAN) 4 mg tablet Take 1 tablet (4 mg  "total) by mouth every 8 (eight) hours as needed for nausea or vomiting 20 tablet 3    PARoxetine (PAXIL) 30 mg tablet Take 2 tablets (60 mg total) by mouth in the morning 180 tablet 1    QUEtiapine (SEROquel XR) 400 mg 24 hr tablet Take 1 tablet (400 mg total) by mouth daily at bedtime 90 tablet 3    sucralfate (CARAFATE) 1 g tablet Take 1 tablet (1 g total) by mouth 3 (three) times a day with meals 90 tablet 0     No current facility-administered medications for this visit.     Objective   /78 (BP Location: Left arm, Patient Position: Sitting, Cuff Size: Large)   Temp 98.8 °F (37.1 °C) (Tympanic)   Ht 5' 2\" (1.575 m)   Wt 80.7 kg (178 lb)   BMI 32.56 kg/m²     Physical Exam  Vitals and nursing note reviewed.   Constitutional:       General: She is not in acute distress.     Appearance: Normal appearance. She is not ill-appearing.   HENT:      Head: Normocephalic and atraumatic.      Nose: Nose normal.      Mouth/Throat:      Mouth: Mucous membranes are moist.   Eyes:      General: No scleral icterus.     Conjunctiva/sclera: Conjunctivae normal.   Cardiovascular:      Rate and Rhythm: Normal rate and regular rhythm.   Pulmonary:      Effort: Pulmonary effort is normal. No respiratory distress.   Abdominal:      General: Bowel sounds are normal. There is no distension.      Palpations: Abdomen is soft. There is no mass.      Tenderness: There is no abdominal tenderness. There is no guarding or rebound.      Hernia: No hernia is present.   Musculoskeletal:         General: Normal range of motion.      Cervical back: Normal range of motion.      Right lower leg: No edema.      Left lower leg: No edema.   Skin:     General: Skin is warm and dry.      Coloration: Skin is not jaundiced.      Findings: No bruising.   Neurological:      General: No focal deficit present.      Mental Status: She is alert and oriented to person, place, and time.      Motor: No weakness.   Psychiatric:         Mood and Affect: Mood " normal.         Behavior: Behavior normal.          Lab Results: I personally reviewed relevant lab results.       Results for orders placed during the hospital encounter of 04/25/25    EGD    Impression  Grade D esophagitis  5 cm hiatal hernia  Mild edematous, erythematous mucosa in the antrum; performed cold forceps biopsy to rule out H. pylori  The cardia, fundus of the stomach, body of the stomach and incisura appeared normal.  The duodenal bulb and 2nd part of the duodenum appeared normal.      RECOMMENDATION:  Await pathology results  Return to floors  Resume diet as tolerated  Continue twice daily PPI - Omeprazole 40mg twice daily  Ongoing conservative management of nausea and vomiting  Follow up with thoracic surgery in outpatient as scheduled for consideration of hernia repair            Ilene Navarro DO

## 2025-05-06 NOTE — ASSESSMENT & PLAN NOTE
Gastric emptying study 1/2023 with 29% emptying at 4 hours. Currently tolerable on reglan with meals. Does have episodes of intractable N/V associated with occasional hematemesis thought to be related to severe esophagitis  Surgical referral as above  Continue reglan - discussed side effects to monitor for. QTC okay on EKG during recent hospitalization  PRN zofran  Orders:    Ambulatory Referral to General Surgery; Future

## 2025-05-06 NOTE — PATIENT INSTRUCTIONS
Scheduled date of EGD(as of today):08/07/2025  Physician performing EGD: DEUCE  Location of EGD: [place]  Instructions reviewed with patient by:NR  Clearances: NONE  Also scheduled an appointment with Dr. Cedeño for this patient 06/13/2025 at 9:30 am    Recall is in for a follow up  for November / December

## 2025-05-06 NOTE — ASSESSMENT & PLAN NOTE
Documented history of Schreiber's esophagus. However, all available biopsies dating back to 2020 available for review show no evidence of Schreiber's esophagus. Unclear if this diagnosis predated EMR, but fortunately patient without Schreiber's esophagus on biopsy most recently as 12/2024.

## 2025-05-06 NOTE — ANESTHESIA POSTPROCEDURE EVALUATION
Post-Op Assessment Note    CV Status:  Stable    Pain management: adequate       Mental Status:  Alert and awake   Hydration Status:  Euvolemic   PONV Controlled:  Controlled   Airway Patency:  Patent     Post Op Vitals Reviewed: Yes    No anethesia notable event occurred.    Staff: Anesthesiologist, with CRNAs           Last Filed PACU Vitals:  Vitals Value Taken Time   Temp 98.5 °F (36.9 °C) 04/28/25 1225   Pulse 87 04/28/25 1240   /56 04/28/25 1240   Resp 18 04/28/25 1240   SpO2 99 % 04/28/25 1240       Modified Katlyn:     Vitals Value Taken Time   Activity 2 04/28/25 1249   Respiration 2 04/28/25 1249   Circulation 2 04/28/25 1249   Consciousness 2 04/28/25 1249   Oxygen Saturation 2 04/28/25 1249     Modified Katlyn Score: 10

## 2025-05-06 NOTE — ASSESSMENT & PLAN NOTE
S/p cTIF in 2021 - Now with hiatal hernia recurrence and persistent Grade D esophagitis. Patient symptomatic from hernia and esophagitis. Currently with follow up scheduled with thoracic surgery  Recommend patient see Dr. Cedeño given both hiatal hernia and severe gastroparesis. Advised that fixing hiatal hernia could exacerbate gastroparesis and would recommend considering pyloroplasty.   Patient does have history of ineffective esophageal motility, but currently dysphagia is not bothersome given her altered gastroparesis diet  Continue Omeprazole bid - repeat EGD in 2-3 months and consider Voquezna if no improvement in esophagitis  Orders:    EGD; Future    Ambulatory Referral to General Surgery; Future

## 2025-05-08 DIAGNOSIS — F99 PSYCHIATRIC DISORDER: ICD-10-CM

## 2025-05-08 DIAGNOSIS — F41.9 ANXIETY: ICD-10-CM

## 2025-05-08 DIAGNOSIS — F25.1 SCHIZOAFFECTIVE DISORDER, DEPRESSIVE TYPE (HCC): ICD-10-CM

## 2025-05-08 RX ORDER — QUETIAPINE 400 MG/1
400 TABLET, FILM COATED, EXTENDED RELEASE ORAL
Qty: 90 TABLET | Refills: 3 | Status: SHIPPED | OUTPATIENT
Start: 2025-05-08

## 2025-05-08 RX ORDER — PAROXETINE 30 MG/1
60 TABLET, FILM COATED ORAL DAILY
Qty: 180 TABLET | Refills: 1 | Status: SHIPPED | OUTPATIENT
Start: 2025-05-08

## 2025-05-08 NOTE — TELEPHONE ENCOUNTER
Call from patient advising she is completely out of her Paxil and Seroquel XR and needs them sent to the [facility] in [place] today. Thank you!

## 2025-05-13 ENCOUNTER — TELEPHONE (OUTPATIENT)
Dept: INTERNAL MEDICINE CLINIC | Facility: CLINIC | Age: 65
End: 2025-05-13

## 2025-05-13 ENCOUNTER — TELEPHONE (OUTPATIENT)
Age: 65
End: 2025-05-13

## 2025-05-13 DIAGNOSIS — R26.2 AMBULATORY DYSFUNCTION: ICD-10-CM

## 2025-05-13 DIAGNOSIS — M25.562 ACUTE PAIN OF LEFT KNEE: Primary | ICD-10-CM

## 2025-05-13 DIAGNOSIS — R53.1 GENERALIZED WEAKNESS: Primary | ICD-10-CM

## 2025-05-13 NOTE — TELEPHONE ENCOUNTER
Pt called in regards of a fall she had last week and 3 days ago, pt is having extreme left knee pain she rates it at a 7 with some brusing, swollen left knee and some back pain. Pt was advised to go the the emergency room for some xrays to be taken and to make a f/u appt with  right away. Pt refused to make a appt and will think about going to the  ED.

## 2025-05-13 NOTE — TELEPHONE ENCOUNTER
Patient called because after she was released from the hospital she was set up with [facility] but once they came they closed the case because they advise her that she seems strong and does not need any follow up home care. She said shortly after that she got dizzy and fell up the stairs then she was taking the trash out and tripped over the bag and fell causing pain in her right shoulder and left knee and it is black and blue. She wants to know if her provider can write her a script for the [facility] PT again. Please advise. Thank you.

## 2025-05-13 NOTE — TELEPHONE ENCOUNTER
Done, let pt know.  Since [facility] physical therapy was not covered by insurance, I put in referral for home PT to see if that will be covered

## 2025-05-13 NOTE — TELEPHONE ENCOUNTER
Patient called to report that she received a called from outpatient physical therapy and she request a home physical therapy . We explain the patient the order is add it in her chart for home physical therapy

## 2025-05-14 ENCOUNTER — APPOINTMENT (OUTPATIENT)
Dept: RADIOLOGY | Age: 65
End: 2025-05-14
Payer: COMMERCIAL

## 2025-05-14 DIAGNOSIS — M25.562 ACUTE PAIN OF LEFT KNEE: ICD-10-CM

## 2025-05-14 DIAGNOSIS — R11.2 NAUSEA AND VOMITING, UNSPECIFIED VOMITING TYPE: ICD-10-CM

## 2025-05-14 PROCEDURE — 73564 X-RAY EXAM KNEE 4 OR MORE: CPT

## 2025-05-14 RX ORDER — ONDANSETRON 4 MG/1
4 TABLET, FILM COATED ORAL EVERY 8 HOURS PRN
Qty: 20 TABLET | Refills: 3 | Status: SHIPPED | OUTPATIENT
Start: 2025-05-14

## 2025-05-14 NOTE — TELEPHONE ENCOUNTER
PT said she has been using the zofran everyday since Dr Cain prescribed it earlier this month and when she tried to refill, its is too early to fill. PT requesting to please authorize an early fill, since she has been having  stomach upsets daily    Also, PT stated that she had the XR of her knee yesterday, and currently her left knee is twice the size of the right, and black and blue. PT is requesting a phone call as soon as the XR is resulted.    Please advise    ThankYou

## 2025-05-15 ENCOUNTER — RESULTS FOLLOW-UP (OUTPATIENT)
Dept: INTERNAL MEDICINE CLINIC | Facility: CLINIC | Age: 65
End: 2025-05-15

## 2025-06-02 DIAGNOSIS — R11.2 NAUSEA AND VOMITING, UNSPECIFIED VOMITING TYPE: ICD-10-CM

## 2025-06-02 DIAGNOSIS — K22.10 EROSIVE ESOPHAGITIS: ICD-10-CM

## 2025-06-02 RX ORDER — SUCRALFATE 1 G/1
1 TABLET ORAL
Qty: 90 TABLET | Refills: 3 | Status: SHIPPED | OUTPATIENT
Start: 2025-06-02 | End: 2025-09-30

## 2025-06-02 RX ORDER — ONDANSETRON 4 MG/1
4 TABLET, FILM COATED ORAL EVERY 8 HOURS PRN
Qty: 20 TABLET | Refills: 3 | Status: SHIPPED | OUTPATIENT
Start: 2025-06-02 | End: 2025-06-04 | Stop reason: SDUPTHER

## 2025-06-04 ENCOUNTER — TELEPHONE (OUTPATIENT)
Age: 65
End: 2025-06-04

## 2025-06-04 DIAGNOSIS — R05.1 ACUTE COUGH: Primary | ICD-10-CM

## 2025-06-04 DIAGNOSIS — R11.2 NAUSEA AND VOMITING, UNSPECIFIED VOMITING TYPE: ICD-10-CM

## 2025-06-04 RX ORDER — BENZONATATE 100 MG/1
100 CAPSULE ORAL 3 TIMES DAILY PRN
Qty: 30 CAPSULE | Refills: 5 | Status: SHIPPED | OUTPATIENT
Start: 2025-06-04

## 2025-06-04 RX ORDER — ONDANSETRON 4 MG/1
4 TABLET, FILM COATED ORAL EVERY 8 HOURS PRN
Qty: 20 TABLET | Refills: 3 | Status: SHIPPED | OUTPATIENT
Start: 2025-06-04

## 2025-06-04 NOTE — TELEPHONE ENCOUNTER
Pt has a Raspy cough and spitting up phlegm.  She said the last time this happened Dr. Cain gave her Sheeba Jaramillo. She would like to know if you could order that again for her cough.

## 2025-06-05 ENCOUNTER — TELEPHONE (OUTPATIENT)
Dept: INTERNAL MEDICINE CLINIC | Facility: CLINIC | Age: 65
End: 2025-06-05

## 2025-06-05 NOTE — TELEPHONE ENCOUNTER
PA for Benzonatate 100MG capsules  SUBMITTED to [employer]    via    [x]CMM-KEY: [id number]  []Surescripts-Case ID #   []Availity-Auth ID # NDC #   []Faxed to plan   []Other website   []Phone call Case ID #     []PA sent as URGENT    All office notes, labs and other pertaining documents and studies sent. Clinical questions answered. Awaiting determination from insurance company.     Turnaround time for your insurance to make a decision on your Prior Authorization can take 7-21 business days.

## 2025-06-06 NOTE — TELEPHONE ENCOUNTER
PA for benzonatate 100 mg DENIED    Reason:(Screenshot if applicable)        Message sent to office clinical pool Yes    Denial letter scanned into Media Yes    We can gladly do an appeal but the process can take about 30-60 days to provide determination. Please have the office staff schedule a Peer to Peer at phone [phone] . If an appeal is truly warranted please have Provider send clinical documentation to the PA department to support the appeal.     **Please follow up with your patient regarding denial and next steps**

## 2025-06-06 NOTE — TELEPHONE ENCOUNTER
Patient called back about medication name for coughing pearls. Informed of name. NFA at this time

## 2025-06-13 ENCOUNTER — CONSULT (OUTPATIENT)
Dept: SURGERY | Facility: CLINIC | Age: 65
End: 2025-06-13
Payer: COMMERCIAL

## 2025-06-13 VITALS
DIASTOLIC BLOOD PRESSURE: 93 MMHG | BODY MASS INDEX: 33.23 KG/M2 | WEIGHT: 180.6 LBS | HEART RATE: 93 BPM | OXYGEN SATURATION: 96 % | SYSTOLIC BLOOD PRESSURE: 121 MMHG | HEIGHT: 62 IN

## 2025-06-13 DIAGNOSIS — K21.00 HIATAL HERNIA WITH GERD AND ESOPHAGITIS: ICD-10-CM

## 2025-06-13 DIAGNOSIS — K44.9 HIATAL HERNIA WITH GERD AND ESOPHAGITIS: ICD-10-CM

## 2025-06-13 DIAGNOSIS — R11.2 NAUSEA AND VOMITING, UNSPECIFIED VOMITING TYPE: Primary | ICD-10-CM

## 2025-06-13 DIAGNOSIS — K22.4 INEFFECTIVE ESOPHAGEAL MOTILITY: ICD-10-CM

## 2025-06-13 DIAGNOSIS — K31.84 GASTROPARESIS: ICD-10-CM

## 2025-06-13 PROCEDURE — 99205 OFFICE O/P NEW HI 60 MIN: CPT | Performed by: SURGERY

## 2025-06-13 RX ORDER — ONDANSETRON 4 MG/1
4 TABLET, FILM COATED ORAL EVERY 8 HOURS PRN
Qty: 20 TABLET | Refills: 3 | Status: CANCELLED | OUTPATIENT
Start: 2025-06-13

## 2025-06-13 NOTE — PROGRESS NOTES
Name: Irene Plascencia      : 1960      MRN: [id number]  Encounter Provider: David Cedeño MD  Encounter Date: 2025   Encounter department: [facility] [place]  :  Assessment & Plan  Nausea and vomiting, unspecified vomiting type    Orders:    FL Barium Swallow Motility Study; Future    Hiatal hernia with GERD and esophagitis  64-year-old female with a history of laparoscopic paraesophageal hernia repair with intraoperative TIF on 2021, here with a recurrent hernia as well as gastroparesis.    Plan:  We had a long conversation regarding her current symptomatology as well as her objective findings.  Given her grade D esophagitis as well as recurrent hernia she has an obvious indication to return to the operating room.  Additionally she has pretty severe gastroparesis which is almost certainly contributing to her symptomatology.    I would like to complete her workup with a barium motility study.  As long as she has normal esophageal motility we will plan to return to the operating room for robotic takedown of TIF, redo paraesophageal hernia repair with mesh partial fundoplication and pyloroplasty.  To return to clinic after her barium motility study to discuss these findings.         Gastroparesis  Significant delayed gastric emptying on gastric emptying study.  Will do a pyloroplasty at the time of revisional foregut surgery.         Ineffective esophageal motility  Based on manometry, will obtain a barium motility to study to confirm as well as show the size and shape of her hernia.             History of Present Illness   HPI  Irene Plascencia is a 64 y.o. female who presents with a recurrent hiatal hernia as well as GERD and esophagitis as well as gastroparesis.  She is known to me for a laparoscopic paraesophageal hernia repair with intraoperative TIF with Dr. Cadet on 2021.  Over the past 2 years she has noticed nausea with increasing episodes of emesis  that is bloody at times as well as acid feeling within her lower esophagus.  This is led to several hospitalizations for emesis.  Most recently she underwent upper endoscopy on 4/28/2025 which I reviewed in PACS which is notable for a recurrent hiatal hernia, and some grade D esophagitis.  She underwent esophageal manometry on 2/8/2025 which I reviewed in PACS which shows 1 weekend and 6 failed swallows with a median IRP of 15 and mean DCI of 302.  This was consistent with ineffective esophageal motility.  Finally, she underwent gastric emptying study on 1/26/2023 which showed 29% emptying at 4 hours consistent with severe delayed gastric emptying.  History obtained from: patient    Review of Systems   Constitutional:  Negative for chills, fatigue and fever.   HENT:  Negative for ear pain, facial swelling, sinus pressure and sinus pain.    Eyes:  Negative for pain.   Respiratory:  Negative for cough, shortness of breath and wheezing.    Cardiovascular:  Negative for chest pain.   Gastrointestinal:  Positive for abdominal distention, nausea and vomiting. Negative for abdominal pain, constipation and diarrhea.   Endocrine: Negative for cold intolerance and heat intolerance.   Genitourinary:  Negative for dysuria and flank pain.   Musculoskeletal:  Negative for back pain and neck pain.   Skin:  Negative for wound.   Neurological:  Negative for syncope, facial asymmetry, light-headedness and numbness.   Psychiatric/Behavioral:  Negative for behavioral problems, confusion and suicidal ideas.      Past Medical History   Past Medical History[1]  Past Surgical History[2]  Family History[3]   reports that she has never smoked. She has been exposed to tobacco smoke. She has never used smokeless tobacco. She reports that she does not currently use alcohol. She reports that she does not currently use drugs.  Current Outpatient Medications   Medication Instructions    amLODIPine (NORVASC) 5 mg, Oral, Daily, Hold if your blood  "pressure is <110/70    benzonatate (TESSALON PERLES) 100 mg, Oral, 3 times daily PRN    levothyroxine 25 mcg, Oral, Daily    LORazepam (ATIVAN) 2 mg, Oral, Every 6 hours PRN, Max 5 times a day    meclizine (ANTIVERT) 25 mg, Oral, Every 8 hours PRN    metoclopramide (REGLAN) 5 mg, Oral, 3 times daily before meals    omeprazole (PRILOSEC) 40 mg, Oral, 2 times daily    ondansetron (ZOFRAN) 4 mg, Oral, Every 8 hours PRN    PARoxetine (PAXIL) 60 mg, Oral, Daily    QUEtiapine (SEROQUEL XR) 400 mg, Oral, Daily at bedtime    sucralfate (CARAFATE) 1 g, Oral, 3 times daily with meals   Allergies[4]   Medications Ordered Prior to Encounter[5]   Social History[6]     Objective   /93 (Patient Position: Sitting, Cuff Size: Adult)   Pulse 93   Ht 5' 2\" (1.575 m)   Wt 81.9 kg (180 lb 9.6 oz)   SpO2 96%   BMI 33.03 kg/m²      Physical Exam  Vitals and nursing note reviewed.   Constitutional:       General: She is not in acute distress.     Appearance: Normal appearance. She is not ill-appearing.   HENT:      Head: Normocephalic and atraumatic.      Mouth/Throat:      Mouth: Mucous membranes are moist.     Eyes:      Extraocular Movements: Extraocular movements intact.       Cardiovascular:      Rate and Rhythm: Normal rate and regular rhythm.   Pulmonary:      Effort: Pulmonary effort is normal. No respiratory distress.      Breath sounds: Normal breath sounds. No stridor.   Abdominal:      General: There is no distension.      Palpations: Abdomen is soft. There is no mass.      Tenderness: There is no abdominal tenderness.      Hernia: No hernia is present.      Comments: Well-healed port sites.     Musculoskeletal:         General: No swelling or tenderness. Normal range of motion.     Skin:     General: Skin is warm and dry.      Findings: No lesion.     Neurological:      General: No focal deficit present.      Mental Status: She is alert and oriented to person, place, and time.     Psychiatric:         Mood and " Affect: Mood normal.         Behavior: Behavior normal.                [1]   Past Medical History:  Diagnosis Date    Anemia     Anxiety     Arthritis     Back pain at L4-L5 level     Schreiber esophagus     Bulimia     Colon polyp     Disease of thyroid gland     hypothyroid    Esophageal varices (HCC)     GERD (gastroesophageal reflux disease)     Hearing loss in left ear     Hernia     History of transfusion     Hyperlipidemia     Hypertension     Hypothyroidism     Leukopenia     NMS (neuroleptic malignant syndrome) 01/03/2020    Pneumonia     RA (rheumatoid arthritis) (HCC)     in feet    Sciatica     Seizure (HCC)     due to medication mix up 8/2018 only one historically    Skin spots, red     lower right arm - poss from cat- no s/s infection    Synovial cyst of lumbar spine     Vertigo     Wears dentures     full set    Weight gain    [2]   Past Surgical History:  Procedure Laterality Date    ABDOMINAL SURGERY      BONE MARROW BIOPSY      BREAST SURGERY      enlargement with implants    CLOSED REDUCTION DISTAL FEMUR FRACTURE      COLONOSCOPY      COSMETIC SURGERY      DENTAL SURGERY      HERNIA REPAIR      HIP ARTHROPLASTY Right 01/02/2020    Procedure: REVISION TOTAL HIP ARTHROPLASTY WITH REPAIR OF PARIPROSTHETIC FRACTURE - POSTERIOR APPROACH;  Surgeon: Dilan Pedersen MD;  Location: [facility];  Service: Orthopedics    MO AMPUTATION METATARSAL W/TOE SINGLE Left 04/07/2023    Procedure: AMPUTATION TOE 4th;  Surgeon: Conner Bartlett DPM;  Location:  MAIN OR;  Service: Podiatry    MO ARTHRP ACETBLR/PROX FEM PROSTC AGRFT/ALGRFT Right 12/11/2019    Procedure: ARTHROPLASTY HIP TOTAL ANTERIOR;  Surgeon: Dilan Pedersen MD;  Location:  MAIN OR;  Service: Orthopedics    MO ESOPHAGOGASTRODUODENOSCOPY TRANSORAL DIAGNOSTIC N/A 05/11/2021    Procedure: ESOPHAGOGASTRODUODENOSCOPY (EGD);  Surgeon: David Cedeño MD;  Location:  MAIN OR;  Service: General    MO LAPS RPR PARAESPHGL HRNA INCL FUNDPLSTY W/MESH N/A  05/11/2021    Procedure: REPAIR HERNIA PARAESOPHAGEAL  LAPAROSCOPIC;  Surgeon: David Cedeño MD;  Location: [facility];  Service: General    TN UNLISTED PROCEDURE ESOPHAGUS N/A 05/11/2021    Procedure: FUNDOPLICATION TRANSORAL INCISIONLESS;  Surgeon: Brad Cadet MD;  Location: [facility];  Service: Gastroenterology    RHINOPLASTY      SINUS SURGERY      TOE AMPUTATION Left     2023 4th toe    TRANSORAL INCISIONLESS FUNDOPLICATION (TIF)  05/11/2021    UPPER GASTROINTESTINAL ENDOSCOPY     [3]   Family History  Problem Relation Name Age of Onset    Uterine cancer Mother Anya Plascencia     Depression Mother Anya Plascencia     Esophageal cancer Father      Colon cancer Maternal Grandmother Anabel Whit         Nh m.p mop nip   [4]   Allergies  Allergen Reactions    Latex Anaphylaxis, Rash and Tongue Swelling     Band aids, adhesives wears normal underwear, can eat bananas  USE PAPER TAPE    Risperdal [Risperidone] Shortness Of Breath     Rapid heart beat, SOB    Zyprexa [Olanzapine] Shortness Of Breath     Rapid heartbeat    Microplegia Msa-Msg [Plegisol] GI Intolerance and Headache   [5]   Current Outpatient Medications on File Prior to Visit   Medication Sig Dispense Refill    amLODIPine (NORVASC) 5 mg tablet Take 1 tablet (5 mg total) by mouth daily Hold if your blood pressure is <110/70 90 tablet 0    benzonatate (TESSALON PERLES) 100 mg capsule Take 1 capsule (100 mg total) by mouth 3 (three) times a day as needed for cough 30 capsule 5    levothyroxine 25 mcg tablet Take 1 tablet (25 mcg total) by mouth daily 90 tablet 1    LORazepam (ATIVAN) 2 mg tablet Take 1 tablet (2 mg total) by mouth every 6 (six) hours as needed for anxiety Max 5 times a day 180 tablet 1    meclizine (ANTIVERT) 25 mg tablet Take 1 tablet (25 mg total) by mouth every 8 (eight) hours as needed for dizziness 30 tablet 5    metoclopramide (REGLAN) 5 mg tablet Take 1 tablet (5 mg total) by mouth 3 (three) times a day before meals 90  tablet 1    ondansetron (ZOFRAN) 4 mg tablet Take 1 tablet (4 mg total) by mouth every 8 (eight) hours as needed for nausea or vomiting 20 tablet 3    PARoxetine (PAXIL) 30 mg tablet Take 2 tablets (60 mg total) by mouth in the morning 180 tablet 1    QUEtiapine (SEROquel XR) 400 mg 24 hr tablet Take 1 tablet (400 mg total) by mouth daily at bedtime 90 tablet 3    sucralfate (CARAFATE) 1 g tablet Take 1 tablet (1 g total) by mouth 3 (three) times a day with meals 90 tablet 3    omeprazole (PriLOSEC) 40 MG capsule Take 1 capsule (40 mg total) by mouth 2 (two) times a day 60 capsule 0     No current facility-administered medications on file prior to visit.   [6]   Social History  Tobacco Use    Smoking status: Never     Passive exposure: Past    Smokeless tobacco: Never   Vaping Use    Vaping status: Never Used   Substance and Sexual Activity    Alcohol use: Not Currently    Drug use: Not Currently    Sexual activity: Not Currently

## 2025-06-13 NOTE — ASSESSMENT & PLAN NOTE
64-year-old female with a history of laparoscopic paraesophageal hernia repair with intraoperative TIF on 5/11/2021, here with a recurrent hernia as well as gastroparesis.    Plan:  We had a long conversation regarding her current symptomatology as well as her objective findings.  Given her grade D esophagitis as well as recurrent hernia she has an obvious indication to return to the operating room.  Additionally she has pretty severe gastroparesis which is almost certainly contributing to her symptomatology.    I would like to complete her workup with a barium motility study.  As long as she has normal esophageal motility we will plan to return to the operating room for robotic takedown of TIF, redo paraesophageal hernia repair with mesh partial fundoplication and pyloroplasty.  To return to clinic after her barium motility study to discuss these findings.

## 2025-06-13 NOTE — ASSESSMENT & PLAN NOTE
Significant delayed gastric emptying on gastric emptying study.  Will do a pyloroplasty at the time of revisional foregut surgery.

## 2025-06-13 NOTE — ASSESSMENT & PLAN NOTE
Based on manometry, will obtain a barium motility to study to confirm as well as show the size and shape of her hernia.

## 2025-06-15 DIAGNOSIS — F41.9 ANXIETY: ICD-10-CM

## 2025-06-16 ENCOUNTER — PROCEDURE VISIT (OUTPATIENT)
Dept: PODIATRY | Facility: CLINIC | Age: 65
End: 2025-06-16
Payer: COMMERCIAL

## 2025-06-16 VITALS — HEIGHT: 62 IN | RESPIRATION RATE: 18 BRPM | BODY MASS INDEX: 32.57 KG/M2 | WEIGHT: 177 LBS

## 2025-06-16 DIAGNOSIS — F41.9 ANXIETY: ICD-10-CM

## 2025-06-16 DIAGNOSIS — I73.9 PERIPHERAL VASCULAR DISEASE, UNSPECIFIED (HCC): ICD-10-CM

## 2025-06-16 DIAGNOSIS — B35.1 ONYCHOMYCOSIS: Primary | ICD-10-CM

## 2025-06-16 DIAGNOSIS — L84 CALLUS: ICD-10-CM

## 2025-06-16 DIAGNOSIS — K21.00 GASTROESOPHAGEAL REFLUX DISEASE WITH ESOPHAGITIS WITHOUT HEMORRHAGE: ICD-10-CM

## 2025-06-16 DIAGNOSIS — K31.84 GASTROPARESIS: ICD-10-CM

## 2025-06-16 PROCEDURE — 11721 DEBRIDE NAIL 6 OR MORE: CPT | Performed by: PODIATRIST

## 2025-06-16 PROCEDURE — RECHECK: Performed by: PODIATRIST

## 2025-06-16 PROCEDURE — 11056 PARNG/CUTG B9 HYPRKR LES 2-4: CPT | Performed by: PODIATRIST

## 2025-06-16 RX ORDER — OMEPRAZOLE 40 MG/1
40 CAPSULE, DELAYED RELEASE ORAL 2 TIMES DAILY
Qty: 60 CAPSULE | Refills: 0 | Status: SHIPPED | OUTPATIENT
Start: 2025-06-16 | End: 2025-07-16

## 2025-06-16 RX ORDER — LORAZEPAM 2 MG/1
2 TABLET ORAL EVERY 6 HOURS PRN
Qty: 180 TABLET | Refills: 1 | OUTPATIENT
Start: 2025-06-16

## 2025-06-16 RX ORDER — METOCLOPRAMIDE 5 MG/1
5 TABLET ORAL
Qty: 90 TABLET | Refills: 1 | Status: SHIPPED | OUTPATIENT
Start: 2025-06-16

## 2025-06-16 RX ORDER — LORAZEPAM 2 MG/1
2 TABLET ORAL EVERY 6 HOURS PRN
Qty: 150 TABLET | Refills: 2 | Status: SHIPPED | OUTPATIENT
Start: 2025-06-16

## 2025-06-16 NOTE — TELEPHONE ENCOUNTER
Please review to see if the refill is appropriate.   Omeprazole  Metoclopramide    Refill must be reviewed and completed by the office or provider. The refill is unable to be approved or denied by the medication management team.    Cannot be delegated  Lorazepam - duplicate request

## 2025-06-16 NOTE — PROGRESS NOTES
"Name: Irene Plascencia      : 1960      MRN: [id number]  Encounter Provider: Conner Bartlett DPM  Encounter Date: 2025   Encounter department: [facility] [place]    Treatment consisted of debridement of 8 mycotic toenails reducing nails in thickness removing devitalized tissue and debris.  Electrical and manual debridement performed.  Trimmed hyperkeratotic lesions both feet.  :  Assessment & Plan  Onychomycosis  Debridement       Peripheral vascular disease, unspecified (HCC)         Callus  Lesion trimming           History of Present Illness   HPI  Irene Plascencia is a 64 y.o. female who presents for palliative footcare.  Patient does not have her feet addressed and toenails trimmed for over 1 year.  Patient has had her left 4th and 5th toes amputated by me a few years back due to wounds that did not heal.  She has been very pleased with the surgical result.  Today she is here for trimming of calluses and thick toenails.      Review of Systems       Objective   Resp 18   Ht 5' 2\" (1.575 m)   Wt 80.3 kg (177 lb)   BMI 32.37 kg/m²      Physical Exam    Cardiovascular:      Comments: No palpable pedal pulses bilateral    Musculoskeletal:         General: Deformity present.      Comments: Left 4th and 5th toes have been amputated; severe hallux valgus bilateral     Skin:     Findings: Lesion present.      Comments: All toenails are thick and yellow with subungual debris.  Pinch callus first MPJ bilateral     Lesion Destruction    Date/Time: 2025 10:00 AM    Performed by: Conner Bartlett DPM  Authorized by: Conner Bartlett DPM    Procedure Details - Lesion Destruction:     Number of Lesions:  2  Lesion 1:     Body area:  Lower extremity    Lower extremity location:  R foot    Malignancy: benign hyperkeratotic lesion      Destruction method: scissors used for extraction    Lesion 2:     Body area:  Lower extremity    Lower extremity location:  L foot    Malignancy: benign " hyperkeratotic lesion      Destruction method: scissors used for extraction

## 2025-06-25 DIAGNOSIS — R11.2 NAUSEA AND VOMITING, UNSPECIFIED VOMITING TYPE: ICD-10-CM

## 2025-06-25 RX ORDER — ONDANSETRON 4 MG/1
4 TABLET, FILM COATED ORAL EVERY 8 HOURS PRN
Qty: 20 TABLET | Refills: 3 | Status: SHIPPED | OUTPATIENT
Start: 2025-06-25

## 2025-06-25 NOTE — TELEPHONE ENCOUNTER
NOT A DUPLICATE Patient stated she must have lost her recent prescription for the zofran as she went to take it this morning for nausea and cannot find the bottle.  She was wondering if Dr. Cain would send another refill to her [facility] pharmacy, please advise    Requested Prescriptions     Pending Prescriptions Disp Refills    ondansetron (ZOFRAN) 4 mg tablet 20 tablet 3     Sig: Take 1 tablet (4 mg total) by mouth every 8 (eight) hours as needed for nausea or vomiting

## 2025-07-09 DIAGNOSIS — R11.2 NAUSEA AND VOMITING, UNSPECIFIED VOMITING TYPE: ICD-10-CM

## 2025-07-10 RX ORDER — ONDANSETRON 4 MG/1
4 TABLET, FILM COATED ORAL EVERY 8 HOURS PRN
Qty: 20 TABLET | Refills: 3 | Status: SHIPPED | OUTPATIENT
Start: 2025-07-10 | End: 2025-07-16 | Stop reason: SDUPTHER

## 2025-07-11 ENCOUNTER — TELEPHONE (OUTPATIENT)
Dept: GASTROENTEROLOGY | Facility: CLINIC | Age: 65
End: 2025-07-11

## 2025-07-11 NOTE — TELEPHONE ENCOUNTER
Called to schedule 3-4m follow up after Egd with Dr Huynh in [facility].  Wireless customer not receiving calls.  Letter sent

## 2025-07-16 DIAGNOSIS — R11.2 NAUSEA AND VOMITING, UNSPECIFIED VOMITING TYPE: ICD-10-CM

## 2025-07-16 RX ORDER — ONDANSETRON 4 MG/1
4 TABLET, FILM COATED ORAL EVERY 8 HOURS PRN
Qty: 20 TABLET | Refills: 3 | Status: SHIPPED | OUTPATIENT
Start: 2025-07-16

## 2025-07-16 NOTE — TELEPHONE ENCOUNTER
Pt called asking if her nausea medication can be sent to her [facility]- it was sent to the pharmacy on 7/10 but they put it back after not being picked up

## 2025-07-23 ENCOUNTER — PATIENT MESSAGE (OUTPATIENT)
Dept: GASTROENTEROLOGY | Facility: CLINIC | Age: 65
End: 2025-07-23

## 2025-07-26 ENCOUNTER — APPOINTMENT (EMERGENCY)
Dept: RADIOLOGY | Facility: HOSPITAL | Age: 65
End: 2025-07-26
Payer: COMMERCIAL

## 2025-07-26 ENCOUNTER — HOSPITAL ENCOUNTER (EMERGENCY)
Facility: HOSPITAL | Age: 65
Discharge: HOME | End: 2025-07-27
Attending: EMERGENCY MEDICINE | Admitting: EMERGENCY MEDICINE
Payer: COMMERCIAL

## 2025-07-26 VITALS
OXYGEN SATURATION: 95 % | HEART RATE: 103 BPM | TEMPERATURE: 98.1 F | SYSTOLIC BLOOD PRESSURE: 128 MMHG | RESPIRATION RATE: 16 BRPM | DIASTOLIC BLOOD PRESSURE: 82 MMHG

## 2025-07-26 DIAGNOSIS — K20.90 ESOPHAGITIS: ICD-10-CM

## 2025-07-26 DIAGNOSIS — R11.2 NAUSEA AND VOMITING: Primary | ICD-10-CM

## 2025-07-26 LAB
ALBUMIN SERPL BCG-MCNC: 3.6 G/DL (ref 3.5–5)
ALP SERPL-CCNC: 66 U/L (ref 34–104)
ALT SERPL W P-5'-P-CCNC: 6 U/L (ref 7–52)
ANION GAP SERPL CALCULATED.3IONS-SCNC: 8 MMOL/L (ref 4–13)
AST SERPL W P-5'-P-CCNC: 8 U/L (ref 13–39)
BASOPHILS # BLD AUTO: 0.02 THOUSANDS/ÂΜL (ref 0–0.1)
BASOPHILS NFR BLD AUTO: 0 % (ref 0–1)
BILIRUB SERPL-MCNC: 0.31 MG/DL (ref 0.2–1)
BUN SERPL-MCNC: 16 MG/DL (ref 5–25)
CALCIUM SERPL-MCNC: 9.2 MG/DL (ref 8.4–10.2)
CHLORIDE SERPL-SCNC: 95 MMOL/L (ref 96–108)
CO2 SERPL-SCNC: 27 MMOL/L (ref 21–32)
CREAT SERPL-MCNC: 0.51 MG/DL (ref 0.6–1.3)
EOSINOPHIL # BLD AUTO: 0.09 THOUSAND/ÂΜL (ref 0–0.61)
EOSINOPHIL NFR BLD AUTO: 2 % (ref 0–6)
ERYTHROCYTE [DISTWIDTH] IN BLOOD BY AUTOMATED COUNT: 19.9 % (ref 11.6–15.1)
GFR SERPL CREATININE-BSD FRML MDRD: 101 ML/MIN/1.73SQ M
GLUCOSE SERPL-MCNC: 103 MG/DL (ref 65–140)
HCT VFR BLD AUTO: 26.9 % (ref 34.8–46.1)
HGB BLD-MCNC: 8 G/DL (ref 11.5–15.4)
IMM GRANULOCYTES # BLD AUTO: 0.03 THOUSAND/UL (ref 0–0.2)
IMM GRANULOCYTES NFR BLD AUTO: 1 % (ref 0–2)
LIPASE SERPL-CCNC: 8 U/L (ref 11–82)
LYMPHOCYTES # BLD AUTO: 0.71 THOUSANDS/ÂΜL (ref 0.6–4.47)
LYMPHOCYTES NFR BLD AUTO: 12 % (ref 14–44)
MCH RBC QN AUTO: 20.7 PG (ref 26.8–34.3)
MCHC RBC AUTO-ENTMCNC: 29.7 G/DL (ref 31.4–37.4)
MCV RBC AUTO: 70 FL (ref 82–98)
MONOCYTES # BLD AUTO: 0.41 THOUSAND/ÂΜL (ref 0.17–1.22)
MONOCYTES NFR BLD AUTO: 7 % (ref 4–12)
NEUTROPHILS # BLD AUTO: 4.51 THOUSANDS/ÂΜL (ref 1.85–7.62)
NEUTS SEG NFR BLD AUTO: 78 % (ref 43–75)
NRBC BLD AUTO-RTO: 0 /100 WBCS
PLATELET # BLD AUTO: 292 THOUSANDS/UL (ref 149–390)
PMV BLD AUTO: 8.9 FL (ref 8.9–12.7)
POTASSIUM SERPL-SCNC: 3.3 MMOL/L (ref 3.5–5.3)
PROT SERPL-MCNC: 6.4 G/DL (ref 6.4–8.4)
RBC # BLD AUTO: 3.86 MILLION/UL (ref 3.81–5.12)
SODIUM SERPL-SCNC: 130 MMOL/L (ref 135–147)
WBC # BLD AUTO: 5.77 THOUSAND/UL (ref 4.31–10.16)

## 2025-07-26 PROCEDURE — 80053 COMPREHEN METABOLIC PANEL: CPT

## 2025-07-26 PROCEDURE — 99284 EMERGENCY DEPT VISIT MOD MDM: CPT

## 2025-07-26 PROCEDURE — 93005 ELECTROCARDIOGRAM TRACING: CPT

## 2025-07-26 PROCEDURE — 96374 THER/PROPH/DIAG INJ IV PUSH: CPT

## 2025-07-26 PROCEDURE — 74177 CT ABD & PELVIS W/CONTRAST: CPT

## 2025-07-26 PROCEDURE — 83690 ASSAY OF LIPASE: CPT

## 2025-07-26 PROCEDURE — 36415 COLL VENOUS BLD VENIPUNCTURE: CPT

## 2025-07-26 PROCEDURE — 99284 EMERGENCY DEPT VISIT MOD MDM: CPT | Performed by: EMERGENCY MEDICINE

## 2025-07-26 PROCEDURE — 85025 COMPLETE CBC W/AUTO DIFF WBC: CPT

## 2025-07-26 RX ORDER — KETOROLAC TROMETHAMINE 30 MG/ML
15 INJECTION, SOLUTION INTRAMUSCULAR; INTRAVENOUS ONCE
Status: COMPLETED | OUTPATIENT
Start: 2025-07-26 | End: 2025-07-26

## 2025-07-26 RX ORDER — DROPERIDOL 2.5 MG/ML
1 INJECTION, SOLUTION INTRAMUSCULAR; INTRAVENOUS ONCE
Status: COMPLETED | OUTPATIENT
Start: 2025-07-26 | End: 2025-07-26

## 2025-07-26 RX ADMIN — KETOROLAC TROMETHAMINE 15 MG: 30 INJECTION, SOLUTION INTRAMUSCULAR; INTRAVENOUS at 22:18

## 2025-07-26 RX ADMIN — IOHEXOL 100 ML: 350 INJECTION, SOLUTION INTRAVENOUS at 22:27

## 2025-07-27 DIAGNOSIS — K21.00 GASTROESOPHAGEAL REFLUX DISEASE WITH ESOPHAGITIS WITHOUT HEMORRHAGE: ICD-10-CM

## 2025-07-27 LAB
ATRIAL RATE: 108 BPM
P AXIS: 59 DEGREES
PR INTERVAL: 126 MS
QRS AXIS: 23 DEGREES
QRSD INTERVAL: 74 MS
QT INTERVAL: 316 MS
QTC INTERVAL: 423 MS
T WAVE AXIS: 59 DEGREES
VENTRICULAR RATE: 108 BPM

## 2025-07-27 PROCEDURE — 93010 ELECTROCARDIOGRAM REPORT: CPT | Performed by: INTERNAL MEDICINE

## 2025-07-29 RX ORDER — OMEPRAZOLE 40 MG/1
40 CAPSULE, DELAYED RELEASE ORAL 2 TIMES DAILY
Qty: 60 CAPSULE | Refills: 5 | Status: SHIPPED | OUTPATIENT
Start: 2025-07-29

## 2025-07-30 ENCOUNTER — TELEPHONE (OUTPATIENT)
Age: 65
End: 2025-07-30

## 2025-07-31 ENCOUNTER — HOSPITAL ENCOUNTER (EMERGENCY)
Facility: HOSPITAL | Age: 65
Discharge: HOME | End: 2025-07-31
Attending: EMERGENCY MEDICINE | Admitting: EMERGENCY MEDICINE
Payer: COMMERCIAL

## 2025-07-31 ENCOUNTER — TELEPHONE (OUTPATIENT)
Age: 65
End: 2025-07-31

## 2025-07-31 VITALS
OXYGEN SATURATION: 97 % | HEART RATE: 101 BPM | DIASTOLIC BLOOD PRESSURE: 61 MMHG | TEMPERATURE: 97.3 F | SYSTOLIC BLOOD PRESSURE: 118 MMHG | RESPIRATION RATE: 18 BRPM

## 2025-07-31 DIAGNOSIS — E87.6 HYPOKALEMIA: ICD-10-CM

## 2025-07-31 DIAGNOSIS — Z65.8 LACK OF SOCIAL SUPPORT: ICD-10-CM

## 2025-07-31 DIAGNOSIS — E86.0 DEHYDRATION: ICD-10-CM

## 2025-07-31 DIAGNOSIS — R11.0 NAUSEA: Primary | ICD-10-CM

## 2025-07-31 DIAGNOSIS — D50.9 MICROCYTIC ANEMIA: ICD-10-CM

## 2025-07-31 DIAGNOSIS — E87.1 HYPONATREMIA: ICD-10-CM

## 2025-07-31 DIAGNOSIS — Z60.2 PROBLEMS RELATED TO LIVING ALONE: ICD-10-CM

## 2025-07-31 DIAGNOSIS — R10.11 RIGHT UPPER QUADRANT ABDOMINAL PAIN: ICD-10-CM

## 2025-07-31 DIAGNOSIS — Z59.9 PROBLEM RELATED TO LIVING ARRANGEMENT: Primary | ICD-10-CM

## 2025-07-31 LAB
ALBUMIN SERPL BCG-MCNC: 3.4 G/DL (ref 3.5–5)
ALP SERPL-CCNC: 61 U/L (ref 34–104)
ALT SERPL W P-5'-P-CCNC: 6 U/L (ref 7–52)
ANION GAP SERPL CALCULATED.3IONS-SCNC: 8 MMOL/L (ref 4–13)
AST SERPL W P-5'-P-CCNC: 8 U/L (ref 13–39)
ATRIAL RATE: 102 BPM
BASOPHILS # BLD AUTO: 0.03 THOUSANDS/ÂΜL (ref 0–0.1)
BASOPHILS NFR BLD AUTO: 1 % (ref 0–1)
BILIRUB SERPL-MCNC: 0.23 MG/DL (ref 0.2–1)
BUN SERPL-MCNC: 8 MG/DL (ref 5–25)
CALCIUM ALBUM COR SERPL-MCNC: 9.3 MG/DL (ref 8.3–10.1)
CALCIUM SERPL-MCNC: 8.8 MG/DL (ref 8.4–10.2)
CARDIAC TROPONIN I PNL SERPL HS: 3 NG/L (ref ?–50)
CHLORIDE SERPL-SCNC: 92 MMOL/L (ref 96–108)
CO2 SERPL-SCNC: 29 MMOL/L (ref 21–32)
CREAT SERPL-MCNC: 0.58 MG/DL (ref 0.6–1.3)
EOSINOPHIL # BLD AUTO: 0.03 THOUSAND/ÂΜL (ref 0–0.61)
EOSINOPHIL NFR BLD AUTO: 1 % (ref 0–6)
ERYTHROCYTE [DISTWIDTH] IN BLOOD BY AUTOMATED COUNT: 19.9 % (ref 11.6–15.1)
GFR SERPL CREATININE-BSD FRML MDRD: 97 ML/MIN/1.73SQ M
GLUCOSE SERPL-MCNC: 97 MG/DL (ref 65–140)
HCT VFR BLD AUTO: 24.5 % (ref 34.8–46.1)
HGB BLD-MCNC: 7.3 G/DL (ref 11.5–15.4)
IMM GRANULOCYTES # BLD AUTO: 0.04 THOUSAND/UL (ref 0–0.2)
IMM GRANULOCYTES NFR BLD AUTO: 1 % (ref 0–2)
LIPASE SERPL-CCNC: 32 U/L (ref 11–82)
LYMPHOCYTES # BLD AUTO: 0.46 THOUSANDS/ÂΜL (ref 0.6–4.47)
LYMPHOCYTES NFR BLD AUTO: 11 % (ref 14–44)
MAGNESIUM SERPL-MCNC: 1.9 MG/DL (ref 1.9–2.7)
MCH RBC QN AUTO: 20.4 PG (ref 26.8–34.3)
MCHC RBC AUTO-ENTMCNC: 29.8 G/DL (ref 31.4–37.4)
MCV RBC AUTO: 68 FL (ref 82–98)
MONOCYTES # BLD AUTO: 0.5 THOUSAND/ÂΜL (ref 0.17–1.22)
MONOCYTES NFR BLD AUTO: 12 % (ref 4–12)
NEUTROPHILS # BLD AUTO: 3.06 THOUSANDS/ÂΜL (ref 1.85–7.62)
NEUTS SEG NFR BLD AUTO: 74 % (ref 43–75)
NRBC BLD AUTO-RTO: 1 /100 WBCS
P AXIS: 55 DEGREES
PHOSPHATE SERPL-MCNC: 2.7 MG/DL (ref 2.3–4.1)
PLATELET # BLD AUTO: 313 THOUSANDS/UL (ref 149–390)
PMV BLD AUTO: 9.6 FL (ref 8.9–12.7)
POTASSIUM SERPL-SCNC: 2.8 MMOL/L (ref 3.5–5.3)
PR INTERVAL: 126 MS
PROT SERPL-MCNC: 5.8 G/DL (ref 6.4–8.4)
QRS AXIS: 19 DEGREES
QRSD INTERVAL: 76 MS
QT INTERVAL: 350 MS
QTC INTERVAL: 456 MS
RBC # BLD AUTO: 3.58 MILLION/UL (ref 3.81–5.12)
SODIUM SERPL-SCNC: 129 MMOL/L (ref 135–147)
T WAVE AXIS: 70 DEGREES
VENTRICULAR RATE: 102 BPM
WBC # BLD AUTO: 4.12 THOUSAND/UL (ref 4.31–10.16)

## 2025-07-31 PROCEDURE — 96374 THER/PROPH/DIAG INJ IV PUSH: CPT

## 2025-07-31 PROCEDURE — 96375 TX/PRO/DX INJ NEW DRUG ADDON: CPT

## 2025-07-31 PROCEDURE — 83735 ASSAY OF MAGNESIUM: CPT | Performed by: EMERGENCY MEDICINE

## 2025-07-31 PROCEDURE — 96365 THER/PROPH/DIAG IV INF INIT: CPT

## 2025-07-31 PROCEDURE — 85025 COMPLETE CBC W/AUTO DIFF WBC: CPT | Performed by: EMERGENCY MEDICINE

## 2025-07-31 PROCEDURE — 99285 EMERGENCY DEPT VISIT HI MDM: CPT | Performed by: EMERGENCY MEDICINE

## 2025-07-31 PROCEDURE — 93010 ELECTROCARDIOGRAM REPORT: CPT | Performed by: INTERNAL MEDICINE

## 2025-07-31 PROCEDURE — 80053 COMPREHEN METABOLIC PANEL: CPT | Performed by: EMERGENCY MEDICINE

## 2025-07-31 PROCEDURE — 36415 COLL VENOUS BLD VENIPUNCTURE: CPT | Performed by: EMERGENCY MEDICINE

## 2025-07-31 PROCEDURE — 84100 ASSAY OF PHOSPHORUS: CPT | Performed by: EMERGENCY MEDICINE

## 2025-07-31 PROCEDURE — 84484 ASSAY OF TROPONIN QUANT: CPT | Performed by: EMERGENCY MEDICINE

## 2025-07-31 PROCEDURE — 83690 ASSAY OF LIPASE: CPT | Performed by: EMERGENCY MEDICINE

## 2025-07-31 PROCEDURE — 99283 EMERGENCY DEPT VISIT LOW MDM: CPT

## 2025-07-31 PROCEDURE — 96366 THER/PROPH/DIAG IV INF ADDON: CPT

## 2025-07-31 PROCEDURE — 93005 ELECTROCARDIOGRAM TRACING: CPT

## 2025-07-31 PROCEDURE — 96361 HYDRATE IV INFUSION ADD-ON: CPT

## 2025-07-31 RX ORDER — MAGNESIUM HYDROXIDE/ALUMINUM HYDROXICE/SIMETHICONE 120; 1200; 1200 MG/30ML; MG/30ML; MG/30ML
30 SUSPENSION ORAL ONCE
Status: COMPLETED | OUTPATIENT
Start: 2025-07-31 | End: 2025-07-31

## 2025-07-31 RX ORDER — LIDOCAINE HYDROCHLORIDE 20 MG/ML
15 SOLUTION OROPHARYNGEAL ONCE
Status: DISCONTINUED | OUTPATIENT
Start: 2025-07-31 | End: 2025-07-31 | Stop reason: HOSPADM

## 2025-07-31 RX ORDER — DROPERIDOL 2.5 MG/ML
2.5 INJECTION, SOLUTION INTRAMUSCULAR; INTRAVENOUS ONCE
Status: COMPLETED | OUTPATIENT
Start: 2025-07-31 | End: 2025-07-31

## 2025-07-31 RX ORDER — KETOROLAC TROMETHAMINE 30 MG/ML
15 INJECTION, SOLUTION INTRAMUSCULAR; INTRAVENOUS ONCE
Status: COMPLETED | OUTPATIENT
Start: 2025-07-31 | End: 2025-07-31

## 2025-07-31 RX ORDER — POTASSIUM CHLORIDE 14.9 MG/ML
20 INJECTION INTRAVENOUS ONCE
Status: COMPLETED | OUTPATIENT
Start: 2025-07-31 | End: 2025-07-31

## 2025-07-31 RX ORDER — DROPERIDOL 2.5 MG/ML
1 INJECTION, SOLUTION INTRAMUSCULAR; INTRAVENOUS ONCE
Status: COMPLETED | OUTPATIENT
Start: 2025-07-31 | End: 2025-07-31

## 2025-07-31 RX ORDER — PROMETHAZINE HYDROCHLORIDE 25 MG/1
12.5 TABLET ORAL EVERY 6 HOURS PRN
Qty: 30 TABLET | Refills: 0 | Status: SHIPPED | OUTPATIENT
Start: 2025-07-31

## 2025-07-31 RX ORDER — POTASSIUM CHLORIDE 1500 MG/1
60 TABLET, EXTENDED RELEASE ORAL ONCE
Status: COMPLETED | OUTPATIENT
Start: 2025-07-31 | End: 2025-07-31

## 2025-07-31 RX ADMIN — SODIUM CHLORIDE 1000 ML: 0.9 INJECTION, SOLUTION INTRAVENOUS at 12:49

## 2025-07-31 RX ADMIN — POTASSIUM CHLORIDE 20 MEQ: 14.9 INJECTION, SOLUTION INTRAVENOUS at 14:12

## 2025-07-31 RX ADMIN — DROPERIDOL 2.5 MG: 2.5 INJECTION, SOLUTION INTRAMUSCULAR; INTRAVENOUS at 12:29

## 2025-07-31 RX ADMIN — KETOROLAC TROMETHAMINE 15 MG: 30 INJECTION, SOLUTION INTRAMUSCULAR; INTRAVENOUS at 12:48

## 2025-07-31 RX ADMIN — ALUMINUM HYDROXIDE, MAGNESIUM HYDROXIDE, AND SIMETHICONE 30 ML: 200; 200; 20 SUSPENSION ORAL at 12:48

## 2025-07-31 RX ADMIN — POTASSIUM CHLORIDE 60 MEQ: 1500 TABLET, EXTENDED RELEASE ORAL at 14:12

## 2025-07-31 SDOH — ECONOMIC STABILITY - INCOME SECURITY: PROBLEM RELATED TO HOUSING AND ECONOMIC CIRCUMSTANCES, UNSPECIFIED: Z59.9

## 2025-07-31 SDOH — SOCIAL STABILITY - SOCIAL INSECURITY: PROBLEMS RELATED TO LIVING ALONE: Z60.2

## 2025-08-05 ENCOUNTER — PATIENT OUTREACH (OUTPATIENT)
Dept: CASE MANAGEMENT | Facility: OTHER | Age: 65
End: 2025-08-05

## 2025-08-18 ENCOUNTER — TELEPHONE (OUTPATIENT)
Age: 65
End: 2025-08-18

## 2025-08-19 ENCOUNTER — TELEPHONE (OUTPATIENT)
Age: 65
End: 2025-08-19

## (undated) DEVICE — VIOLET BRAIDED (POLYGLACTIN 910), SYNTHETIC ABSORBABLE SUTURE: Brand: COATED VICRYL

## (undated) DEVICE — NEEDLE 18 G X 1 1/2 SAFETY

## (undated) DEVICE — SPONGE PVP SCRUB WING STERILE

## (undated) DEVICE — TUBING SUCTION 5MM X 12 FT

## (undated) DEVICE — ENDOPATH PNEUMONEEDLE INSUFFLATION NEEDLES WITH LUER LOCK CONNECTORS 120MM: Brand: ENDOPATH

## (undated) DEVICE — CHLORAPREP HI-LITE 26ML ORANGE

## (undated) DEVICE — THE SIMPULSE SOLO SYSTEM WITH ULTREX RETRACTABLE SPLASH SHIELD TIP: Brand: SIMPULSE SOLO

## (undated) DEVICE — ENDOPATH 5MM CURVED SCISSORS WITH MONOPOLAR CAUTERY: Brand: ENDOPATH

## (undated) DEVICE — SUT ETHIBOND 2-0 SH/SH 36 IN X523H

## (undated) DEVICE — INTENDED FOR TISSUE SEPARATION, AND OTHER PROCEDURES THAT REQUIRE A SHARP SURGICAL BLADE TO PUNCTURE OR CUT.: Brand: BARD-PARKER SAFETY BLADES SIZE 10, STERILE

## (undated) DEVICE — SUT VICRYL PLUS 1 CTB-1 36 IN VCPB947H

## (undated) DEVICE — PACK MAJOR ORTHO W/SPLITS PBDS

## (undated) DEVICE — VEST SURGEON DISPOSABLE

## (undated) DEVICE — DRAPE SHEET X-LG

## (undated) DEVICE — 3M™ IOBAN™ 2 ANTIMICROBIAL INCISE DRAPE 6650EZ: Brand: IOBAN™ 2

## (undated) DEVICE — DRAPE SHEET THREE QUARTER

## (undated) DEVICE — POSITIONER HANA TABLE PACK

## (undated) DEVICE — TRAY FOLEY 16FR URIMETER SURESTEP

## (undated) DEVICE — BETHLEHEM TOTAL HIP, KIT: Brand: CARDINAL HEALTH

## (undated) DEVICE — PAD GROUNDING ADULT

## (undated) DEVICE — BULB SYRINGE,IRRIGATION WITH PROTECTIVE CAP: Brand: DOVER

## (undated) DEVICE — SUT ETHIBOND 1 CTX 30 IN X865H

## (undated) DEVICE — HOOD: Brand: FLYTE, SURGICOOL

## (undated) DEVICE — 3M™ TEGADERM™ TRANSPARENT FILM DRESSING FRAME STYLE, 1627, 4 IN X 10 IN (10 CM X 25 CM), 20/CT 4CT/CASE: Brand: 3M™ TEGADERM™

## (undated) DEVICE — HARMONIC 1100 SHEARS, 36CM SHAFT LENGTH: Brand: HARMONIC

## (undated) DEVICE — BIPOLAR SEALER 23-301-1 AQM MBS: Brand: AQUAMANTYS™

## (undated) DEVICE — X-RAY DETECTABLE SPONGES,16 PLY: Brand: VISTEC

## (undated) DEVICE — IMPERVIOUS STOCKINETTE: Brand: DEROYAL

## (undated) DEVICE — TROCAR: Brand: KII® SLEEVE

## (undated) DEVICE — SUT VICRYL PLUS 0 CTB-1 27 IN VCPB260H

## (undated) DEVICE — Device: Brand: BOOT LINER, DISPOSABLE

## (undated) DEVICE — SUT MONOCRYL 4-0 PS-2 18 IN Y496G

## (undated) DEVICE — CAPIT HIP COP - ACTIS ONLY

## (undated) DEVICE — ELECTRODE BLADE E-Z CLEAN 4IN -0014A

## (undated) DEVICE — MEDI-VAC YANK SUCT HNDL W/TPRD BULBOUS TIP: Brand: CARDINAL HEALTH

## (undated) DEVICE — SILVER-COATED ANTIMICROBIAL BARRIER DRESSING: Brand: ACTICOAT   4" X 8"

## (undated) DEVICE — SUT VICRYL PLUS 2-0 CTB-1 27 IN VCPB259H

## (undated) DEVICE — RECIPROCATING BLADE HEAVY DUTY LONG, OFFSET  (77.6 X 0.77 X 11.2MM)

## (undated) DEVICE — DRAPE EQUIPMENT RF WAND

## (undated) DEVICE — GLOVE SRG BIOGEL ORTHOPEDIC 8.5

## (undated) DEVICE — TROCAR: Brand: KII SLEEVE

## (undated) DEVICE — PLUMEPEN PRO 10FT

## (undated) DEVICE — SUT MONOCRYL PLUS 3-0 PS-2 27 IN MCP427H

## (undated) DEVICE — HEAVY DUTY TABLE COVER: Brand: CONVERTORS

## (undated) DEVICE — CHLORAPREP HI-LITE 10.5ML ORANGE

## (undated) DEVICE — DRAPE C-ARM X-RAY

## (undated) DEVICE — TROCAR: Brand: KII FIOS FIRST ENTRY

## (undated) DEVICE — ADHESIVE SKIN CLOSR DERMABOND PRINEO

## (undated) DEVICE — U-DRAPE: Brand: CONVERTORS

## (undated) DEVICE — Device: Brand: OMNICLOSE TROCAR SITE CLOSURE DEVICE

## (undated) DEVICE — ACTIS DUOFIX HIP PROSTHESIS (FEMORAL STEM 12/14 TAPER CEMENTLESS SIZE 7 STD COLLAR)  CE
Type: IMPLANTABLE DEVICE | Site: HIP | Status: NON-FUNCTIONAL
Brand: ACTIS

## (undated) DEVICE — INSUFLATION TUBING INSUFLOW (LEXION)

## (undated) DEVICE — ABDOMINAL PAD: Brand: DERMACEA

## (undated) DEVICE — ADHESIVE SKIN HIGH VISCOSITY EXOFIN 1ML

## (undated) DEVICE — FIRST STEP BEDSIDE KIT - STAND-UP POUCH, ENDOSCOPIC CLEANING PAD - 1 POUCH: Brand: FIRST STEP BEDSIDE KIT - STAND-UP POUCH, ENDOSCOPIC CLEANING PAD

## (undated) DEVICE — INVIEW CLEAR LEGGINGS: Brand: CONVERTORS

## (undated) DEVICE — Device: Brand: DEFENDO AIR/WATER/SUCTION AND BIOPSY VALVE

## (undated) DEVICE — AIR AND WATER TUBING/CAP SET FOR OLYMPUS® SCOPES: Brand: ERBE

## (undated) DEVICE — 3M™ STERI-DRAPE™ U-DRAPE 1015: Brand: STERI-DRAPE™

## (undated) DEVICE — DRESSING MEPILEX AG BORDER 4 X 8 IN

## (undated) DEVICE — ARTHROSCOPY FLOOR MAT

## (undated) DEVICE — PACK PBDS LAP CHOLE RF

## (undated) DEVICE — INTENT TO BE USED WITH SUTURE MATERIAL FOR TISSUE CLOSURE: Brand: RICHARD-ALLAN® NEEDLE 1/2 CIRCLE TAPER

## (undated) DEVICE — LIGHT HANDLE COVER SLEEVE DISP BLUE STELLAR

## (undated) DEVICE — GAUZE SPONGES,16 PLY: Brand: CURITY

## (undated) DEVICE — INTENDED FOR TISSUE SEPARATION, AND OTHER PROCEDURES THAT REQUIRE A SHARP SURGICAL BLADE TO PUNCTURE OR CUT.: Brand: BARD-PARKER SAFETY BLADES SIZE 11, STERILE

## (undated) DEVICE — 4.5MM CORTEX SCREW SELF-TAPPING 14MM: Type: IMPLANTABLE DEVICE | Site: HIP | Status: NON-FUNCTIONAL

## (undated) DEVICE — GLOVE PI ULTRA TOUCH SZ.8.5

## (undated) DEVICE — Device

## (undated) DEVICE — SUT ETHIBOND 0 SH/SH 36 IN X524H

## (undated) DEVICE — 2.5MM DRILL TIP GUIDE WIRE 200MM

## (undated) DEVICE — GLOVE INDICATOR PI UNDERGLOVE SZ 8.5 BLUE

## (undated) DEVICE — NEEDLE 25G X 1 1/2

## (undated) DEVICE — SOFT SILICONE HYDROCELLULAR SACRUM DRESSING WITH LOCK AWAY LAYER: Brand: ALLEVYN LIFE SACRUM (LARGE) PACK OF 10

## (undated) DEVICE — DISPOSABLE EQUIPMENT COVER: Brand: SMALL TOWEL DRAPE

## (undated) DEVICE — SUT VICRYL PLUS 0 UR-6 27IN VCP603H

## (undated) DEVICE — DRAPE LAPAROTOMY W/POUCHES

## (undated) DEVICE — 3.2MM DRILL BIT/QC/145MM

## (undated) DEVICE — OSCILLATING TIP SAW CARTRIDGE: Brand: PRECISION FALCON

## (undated) DEVICE — STRL COTTON TIP APPLCTR 6IN PK: Brand: CARDINAL HEALTH

## (undated) DEVICE — SYRINGE 20ML LL

## (undated) DEVICE — SPONGE LAP 18 X 18 IN STRL RFD

## (undated) DEVICE — 4.3MM DRILL BIT/QC/180MM

## (undated) DEVICE — 4.5MM CORTEX SCREW SELF-TAPPING 16MM: Type: IMPLANTABLE DEVICE | Site: HIP | Status: NON-FUNCTIONAL

## (undated) DEVICE — 450 ML BOTTLE OF 0.05% CHLORHEXIDINE GLUCONATE IN 99.95% STERILE WATER FOR IRRIGATION, USP AND APPLICATOR.: Brand: IRRISEPT ANTIMICROBIAL WOUND LAVAGE

## (undated) DEVICE — 3M™ TEGADERM™ TRANSPARENT FILM DRESSING FRAME STYLE, 1628, 6 IN X 8 IN (15 CM X 20 CM), 10/CT 8CT/CASE: Brand: 3M™ TEGADERM™

## (undated) DEVICE — TUBING SMOKE EVAC W/FILTRATION DEVICE PLUMEPORT ACTIV

## (undated) DEVICE — SILVER-COATED ANTIBACTERIAL BARRIER DRESSING: Brand: ACTICOAT SURGIC 10X20CM 5PK US